# Patient Record
Sex: MALE | Race: WHITE | NOT HISPANIC OR LATINO | Employment: OTHER | URBAN - METROPOLITAN AREA
[De-identification: names, ages, dates, MRNs, and addresses within clinical notes are randomized per-mention and may not be internally consistent; named-entity substitution may affect disease eponyms.]

---

## 2017-01-06 ENCOUNTER — ALLSCRIPTS OFFICE VISIT (OUTPATIENT)
Dept: OTHER | Facility: OTHER | Age: 57
End: 2017-01-06

## 2017-01-13 ENCOUNTER — ALLSCRIPTS OFFICE VISIT (OUTPATIENT)
Dept: OTHER | Facility: OTHER | Age: 57
End: 2017-01-13

## 2017-01-23 ENCOUNTER — ALLSCRIPTS OFFICE VISIT (OUTPATIENT)
Dept: OTHER | Facility: OTHER | Age: 57
End: 2017-01-23

## 2017-01-23 LAB
CLARITY UR: NORMAL
COLOR UR: YELLOW
GLUCOSE (HISTORICAL): NORMAL
HGB UR QL STRIP.AUTO: NORMAL
KETONES UR STRIP-MCNC: NORMAL MG/DL
LEUKOCYTE ESTERASE UR QL STRIP: NORMAL
NITRITE UR QL STRIP: NORMAL
PH UR STRIP.AUTO: 5 [PH]
PROT UR STRIP-MCNC: NORMAL MG/DL
SP GR UR STRIP.AUTO: 1.01

## 2017-02-20 ENCOUNTER — ALLSCRIPTS OFFICE VISIT (OUTPATIENT)
Dept: OTHER | Facility: OTHER | Age: 57
End: 2017-02-20

## 2017-03-01 ENCOUNTER — LAB CONVERSION - ENCOUNTER (OUTPATIENT)
Dept: OTHER | Facility: OTHER | Age: 57
End: 2017-03-01

## 2017-03-01 LAB
CHOLEST SERPL-MCNC: 210 MG/DL (ref 125–200)
CHOLEST/HDLC SERPL: 5.7 (CALC)
HDLC SERPL-MCNC: 37 MG/DL
LDL CHOLESTEROL (HISTORICAL): 150 MG/DL (CALC)
NON-HDL-CHOL (CHOL-HDL) (HISTORICAL): 173 MG/DL (CALC)
TRIGL SERPL-MCNC: 117 MG/DL

## 2017-03-06 ENCOUNTER — ALLSCRIPTS OFFICE VISIT (OUTPATIENT)
Dept: OTHER | Facility: OTHER | Age: 57
End: 2017-03-06

## 2017-03-06 ENCOUNTER — GENERIC CONVERSION - ENCOUNTER (OUTPATIENT)
Dept: OTHER | Facility: OTHER | Age: 57
End: 2017-03-06

## 2017-03-17 ENCOUNTER — ALLSCRIPTS OFFICE VISIT (OUTPATIENT)
Dept: OTHER | Facility: OTHER | Age: 57
End: 2017-03-17

## 2017-03-27 ENCOUNTER — ALLSCRIPTS OFFICE VISIT (OUTPATIENT)
Dept: OTHER | Facility: OTHER | Age: 57
End: 2017-03-27

## 2017-03-27 ENCOUNTER — TRANSCRIBE ORDERS (OUTPATIENT)
Dept: ADMINISTRATIVE | Facility: HOSPITAL | Age: 57
End: 2017-03-27

## 2017-03-27 DIAGNOSIS — R51.9 FACIAL PAIN: Primary | ICD-10-CM

## 2017-03-27 DIAGNOSIS — H53.8 OTHER SPECIFIED VISUAL DISTURBANCES: ICD-10-CM

## 2017-03-27 DIAGNOSIS — R42 DIZZINESS AND GIDDINESS: ICD-10-CM

## 2017-04-02 ENCOUNTER — LAB CONVERSION - ENCOUNTER (OUTPATIENT)
Dept: OTHER | Facility: OTHER | Age: 57
End: 2017-04-02

## 2017-04-02 LAB
BACTERIA UR QL AUTO: ABNORMAL /HPF
BILIRUB UR QL STRIP: NEGATIVE
BUN SERPL-MCNC: 10 MG/DL (ref 7–25)
BUN/CREA RATIO (HISTORICAL): NORMAL (CALC) (ref 6–22)
CALCIUM SERPL-MCNC: 9.1 MG/DL (ref 8.6–10.3)
CATECHOLAMINES, TOTAL (HISTORICAL): 188 PG/ML
CHLORIDE SERPL-SCNC: 104 MMOL/L (ref 98–110)
CO2 SERPL-SCNC: 31 MMOL/L (ref 20–31)
COLOR UR: YELLOW
COMMENT (HISTORICAL): CLEAR
CREAT SERPL-MCNC: 1.18 MG/DL (ref 0.7–1.33)
DEPRECATED RDW RBC AUTO: 14.2 % (ref 11–15)
DOPAMINE (HISTORICAL): NORMAL PG/ML
EGFR AFRICAN AMERICAN (HISTORICAL): 79 ML/MIN/1.73M2
EGFR-AMERICAN CALC (HISTORICAL): 68 ML/MIN/1.73M2
EPINEPHRINE PLASMA (HISTORICAL): 26 PG/ML
FECAL OCCULT BLOOD DIAGNOSTIC (HISTORICAL): NEGATIVE
GLUCOSE (HISTORICAL): 95 MG/DL (ref 65–99)
GLUCOSE (HISTORICAL): NEGATIVE
HCT VFR BLD AUTO: 43.2 % (ref 38.5–50)
HGB BLD-MCNC: 14.1 G/DL (ref 13.2–17.1)
HYALINE CASTS #/AREA URNS LPF: ABNORMAL /LPF
KETONES UR STRIP-MCNC: NEGATIVE MG/DL
LEUKOCYTE ESTERASE UR QL STRIP: ABNORMAL
MCH RBC QN AUTO: 29.4 PG (ref 27–33)
MCHC RBC AUTO-ENTMCNC: 32.7 G/DL (ref 32–36)
MCV RBC AUTO: 89.8 FL (ref 80–100)
NITRITE UR QL STRIP: NEGATIVE
NOREPINEPHRINE PLASMA (HISTORICAL): 162 PG/ML
PH UR STRIP.AUTO: 6.5 [PH] (ref 5–8)
PLATELET # BLD AUTO: 182 THOUSAND/UL (ref 140–400)
PMV BLD AUTO: 8.3 FL (ref 7.5–12.5)
POTASSIUM SERPL-SCNC: 4 MMOL/L (ref 3.5–5.3)
PROT UR STRIP-MCNC: NEGATIVE MG/DL
RBC # BLD AUTO: 4.82 MILLION/UL (ref 4.2–5.8)
RBC (HISTORICAL): ABNORMAL /HPF
SODIUM SERPL-SCNC: 141 MMOL/L (ref 135–146)
SP GR UR STRIP.AUTO: 1.02 (ref 1–1.03)
SQUAMOUS EPITHELIAL CELLS (HISTORICAL): ABNORMAL /HPF
WBC # BLD AUTO: 8.1 THOUSAND/UL (ref 3.8–10.8)
WBC # BLD AUTO: ABNORMAL /HPF

## 2017-04-10 ENCOUNTER — ALLSCRIPTS OFFICE VISIT (OUTPATIENT)
Dept: OTHER | Facility: OTHER | Age: 57
End: 2017-04-10

## 2017-04-16 ENCOUNTER — HOSPITAL ENCOUNTER (OUTPATIENT)
Dept: MRI IMAGING | Facility: HOSPITAL | Age: 57
Discharge: HOME | End: 2017-04-16
Payer: COMMERCIAL

## 2017-04-16 DIAGNOSIS — R51 HEADACHE(784.0): ICD-10-CM

## 2017-04-16 DIAGNOSIS — R42 DIZZINESS AND GIDDINESS: ICD-10-CM

## 2017-04-16 DIAGNOSIS — H53.8 OTHER SPECIFIED VISUAL DISTURBANCES: ICD-10-CM

## 2017-04-16 PROCEDURE — 70553 MRI BRAIN STEM W/O & W/DYE: CPT

## 2017-04-16 PROCEDURE — A9585 GADOBUTROL INJECTION: HCPCS | Performed by: FAMILY MEDICINE

## 2017-04-16 RX ADMIN — GADOBUTROL 10 ML: 604.72 INJECTION INTRAVENOUS at 11:39

## 2017-04-17 ENCOUNTER — GENERIC CONVERSION - ENCOUNTER (OUTPATIENT)
Dept: OTHER | Facility: OTHER | Age: 57
End: 2017-04-17

## 2017-04-24 ENCOUNTER — ALLSCRIPTS OFFICE VISIT (OUTPATIENT)
Dept: OTHER | Facility: OTHER | Age: 57
End: 2017-04-24

## 2017-05-15 ENCOUNTER — ALLSCRIPTS OFFICE VISIT (OUTPATIENT)
Dept: OTHER | Facility: OTHER | Age: 57
End: 2017-05-15

## 2017-05-22 ENCOUNTER — ALLSCRIPTS OFFICE VISIT (OUTPATIENT)
Dept: OTHER | Facility: OTHER | Age: 57
End: 2017-05-22

## 2017-06-05 ENCOUNTER — HOSPITAL ENCOUNTER (OUTPATIENT)
Dept: CT IMAGING | Facility: HOSPITAL | Age: 57
Discharge: HOME | End: 2017-06-05
Attending: THORACIC SURGERY (CARDIOTHORACIC VASCULAR SURGERY)
Payer: COMMERCIAL

## 2017-06-05 DIAGNOSIS — I71.2 THORACIC AORTIC ANEURYSM WITHOUT RUPTURE (HCC): ICD-10-CM

## 2017-06-05 PROCEDURE — 71250 CT THORAX DX C-: CPT

## 2017-06-16 ENCOUNTER — ALLSCRIPTS OFFICE VISIT (OUTPATIENT)
Dept: OTHER | Facility: OTHER | Age: 57
End: 2017-06-16

## 2017-06-17 DIAGNOSIS — E78.2 MIXED HYPERLIPIDEMIA: ICD-10-CM

## 2017-06-23 ENCOUNTER — ALLSCRIPTS OFFICE VISIT (OUTPATIENT)
Dept: OTHER | Facility: OTHER | Age: 57
End: 2017-06-23

## 2017-07-17 ENCOUNTER — ALLSCRIPTS OFFICE VISIT (OUTPATIENT)
Dept: OTHER | Facility: OTHER | Age: 57
End: 2017-07-17

## 2017-08-26 ENCOUNTER — APPOINTMENT (EMERGENCY)
Dept: RADIOLOGY | Facility: HOSPITAL | Age: 57
DRG: 093 | End: 2017-08-26
Payer: COMMERCIAL

## 2017-08-26 ENCOUNTER — HOSPITAL ENCOUNTER (INPATIENT)
Facility: HOSPITAL | Age: 57
LOS: 2 days | Discharge: HOME | DRG: 093 | End: 2017-08-28
Attending: EMERGENCY MEDICINE | Admitting: INTERNAL MEDICINE
Payer: COMMERCIAL

## 2017-08-26 DIAGNOSIS — R53.1 SUBJECTIVE WEAKNESS: ICD-10-CM

## 2017-08-26 DIAGNOSIS — R20.2 PARESTHESIA OF LEFT ARM: Primary | ICD-10-CM

## 2017-08-26 DIAGNOSIS — R20.2 LEFT LEG PARESTHESIAS: ICD-10-CM

## 2017-08-26 DIAGNOSIS — R20.2 FACIAL PARESTHESIA: ICD-10-CM

## 2017-08-26 PROBLEM — K21.9 GASTROESOPHAGEAL REFLUX DISEASE WITHOUT ESOPHAGITIS: Status: ACTIVE | Noted: 2017-08-26

## 2017-08-26 PROBLEM — Z98.890 H/O AORTIC ANEURYSM REPAIR: Status: ACTIVE | Noted: 2017-08-26

## 2017-08-26 PROBLEM — I10 ESSENTIAL HYPERTENSION: Status: ACTIVE | Noted: 2017-08-26

## 2017-08-26 PROBLEM — R20.0 NUMBNESS: Status: ACTIVE | Noted: 2017-08-26

## 2017-08-26 PROBLEM — Z86.79 H/O AORTIC ANEURYSM REPAIR: Status: ACTIVE | Noted: 2017-08-26

## 2017-08-26 LAB
ANION GAP SERPL CALCULATED.3IONS-SCNC: 7 MMOL/L (ref 4–13)
APTT PPP: 28 SECONDS (ref 23–35)
ATRIAL RATE: 64 BPM
BASOPHILS # BLD AUTO: 0.03 THOUSANDS/ΜL (ref 0–0.1)
BASOPHILS NFR BLD AUTO: 0 % (ref 0–1)
BUN SERPL-MCNC: 10 MG/DL (ref 5–25)
CALCIUM SERPL-MCNC: 8.9 MG/DL (ref 8.3–10.1)
CHLORIDE SERPL-SCNC: 106 MMOL/L (ref 100–108)
CO2 SERPL-SCNC: 27 MMOL/L (ref 21–32)
CREAT SERPL-MCNC: 1.21 MG/DL (ref 0.6–1.3)
EOSINOPHIL # BLD AUTO: 0.49 THOUSAND/ΜL (ref 0–0.61)
EOSINOPHIL NFR BLD AUTO: 5 % (ref 0–6)
ERYTHROCYTE [DISTWIDTH] IN BLOOD BY AUTOMATED COUNT: 13.1 % (ref 11.6–15.1)
GFR SERPL CREATININE-BSD FRML MDRD: 66 ML/MIN/1.73SQ M
GLUCOSE SERPL-MCNC: 115 MG/DL (ref 65–140)
HCT VFR BLD AUTO: 41.8 % (ref 36.5–49.3)
HGB BLD-MCNC: 14.4 G/DL (ref 12–17)
INR PPP: 0.97 (ref 0.86–1.16)
LYMPHOCYTES # BLD AUTO: 1.89 THOUSANDS/ΜL (ref 0.6–4.47)
LYMPHOCYTES NFR BLD AUTO: 21 % (ref 14–44)
MCH RBC QN AUTO: 29.9 PG (ref 26.8–34.3)
MCHC RBC AUTO-ENTMCNC: 34.4 G/DL (ref 31.4–37.4)
MCV RBC AUTO: 87 FL (ref 82–98)
MONOCYTES # BLD AUTO: 0.52 THOUSAND/ΜL (ref 0.17–1.22)
MONOCYTES NFR BLD AUTO: 6 % (ref 4–12)
NEUTROPHILS # BLD AUTO: 6.1 THOUSANDS/ΜL (ref 1.85–7.62)
NEUTS SEG NFR BLD AUTO: 68 % (ref 43–75)
NRBC BLD AUTO-RTO: 0 /100 WBCS
P AXIS: 56 DEGREES
PLATELET # BLD AUTO: 171 THOUSANDS/UL (ref 149–390)
PLATELET # BLD AUTO: 204 THOUSANDS/UL (ref 149–390)
PMV BLD AUTO: 10.2 FL (ref 8.9–12.7)
PMV BLD AUTO: 9.6 FL (ref 8.9–12.7)
POTASSIUM SERPL-SCNC: 3.5 MMOL/L (ref 3.5–5.3)
PR INTERVAL: 146 MS
PROTHROMBIN TIME: 12.9 SECONDS (ref 12.1–14.4)
QRS AXIS: 13 DEGREES
QRSD INTERVAL: 78 MS
QT INTERVAL: 396 MS
QTC INTERVAL: 408 MS
RBC # BLD AUTO: 4.81 MILLION/UL (ref 3.88–5.62)
SODIUM SERPL-SCNC: 140 MMOL/L (ref 136–145)
T WAVE AXIS: 72 DEGREES
VENTRICULAR RATE: 64 BPM
WBC # BLD AUTO: 9.06 THOUSAND/UL (ref 4.31–10.16)

## 2017-08-26 PROCEDURE — 85049 AUTOMATED PLATELET COUNT: CPT | Performed by: INTERNAL MEDICINE

## 2017-08-26 PROCEDURE — 85730 THROMBOPLASTIN TIME PARTIAL: CPT | Performed by: EMERGENCY MEDICINE

## 2017-08-26 PROCEDURE — 99285 EMERGENCY DEPT VISIT HI MDM: CPT

## 2017-08-26 PROCEDURE — 80048 BASIC METABOLIC PNL TOTAL CA: CPT | Performed by: EMERGENCY MEDICINE

## 2017-08-26 PROCEDURE — 85610 PROTHROMBIN TIME: CPT | Performed by: EMERGENCY MEDICINE

## 2017-08-26 PROCEDURE — 85025 COMPLETE CBC W/AUTO DIFF WBC: CPT | Performed by: EMERGENCY MEDICINE

## 2017-08-26 PROCEDURE — 93005 ELECTROCARDIOGRAM TRACING: CPT | Performed by: EMERGENCY MEDICINE

## 2017-08-26 PROCEDURE — 36415 COLL VENOUS BLD VENIPUNCTURE: CPT | Performed by: EMERGENCY MEDICINE

## 2017-08-26 PROCEDURE — 70450 CT HEAD/BRAIN W/O DYE: CPT

## 2017-08-26 RX ORDER — OMEPRAZOLE 20 MG/1
20 CAPSULE, DELAYED RELEASE ORAL DAILY
COMMUNITY
End: 2018-02-22

## 2017-08-26 RX ORDER — ASPIRIN 325 MG
325 TABLET ORAL DAILY
Status: DISCONTINUED | OUTPATIENT
Start: 2017-08-27 | End: 2017-08-28 | Stop reason: HOSPADM

## 2017-08-26 RX ORDER — ONDANSETRON 2 MG/ML
4 INJECTION INTRAMUSCULAR; INTRAVENOUS EVERY 6 HOURS PRN
Status: DISCONTINUED | OUTPATIENT
Start: 2017-08-26 | End: 2017-08-28 | Stop reason: HOSPADM

## 2017-08-26 RX ORDER — ATORVASTATIN CALCIUM 40 MG/1
40 TABLET, FILM COATED ORAL EVERY EVENING
Status: DISCONTINUED | OUTPATIENT
Start: 2017-08-26 | End: 2017-08-28 | Stop reason: HOSPADM

## 2017-08-26 RX ORDER — METOPROLOL TARTRATE 50 MG/1
50 TABLET, FILM COATED ORAL EVERY 12 HOURS SCHEDULED
COMMUNITY
End: 2018-07-03 | Stop reason: SDUPTHER

## 2017-08-26 RX ORDER — ASPIRIN 325 MG
325 TABLET ORAL DAILY
COMMUNITY
End: 2020-01-31 | Stop reason: ALTCHOICE

## 2017-08-26 RX ORDER — ACETAMINOPHEN 325 MG/1
650 TABLET ORAL EVERY 4 HOURS PRN
Status: DISCONTINUED | OUTPATIENT
Start: 2017-08-26 | End: 2017-08-28 | Stop reason: HOSPADM

## 2017-08-26 RX ADMIN — ATORVASTATIN CALCIUM 40 MG: 40 TABLET, FILM COATED ORAL at 20:30

## 2017-08-27 ENCOUNTER — APPOINTMENT (INPATIENT)
Dept: RADIOLOGY | Facility: HOSPITAL | Age: 57
DRG: 093 | End: 2017-08-27
Payer: COMMERCIAL

## 2017-08-27 ENCOUNTER — APPOINTMENT (INPATIENT)
Dept: NON INVASIVE DIAGNOSTICS | Facility: HOSPITAL | Age: 57
DRG: 093 | End: 2017-08-27
Payer: COMMERCIAL

## 2017-08-27 LAB
ANION GAP SERPL CALCULATED.3IONS-SCNC: 7 MMOL/L (ref 4–13)
BUN SERPL-MCNC: 12 MG/DL (ref 5–25)
CALCIUM SERPL-MCNC: 8.3 MG/DL (ref 8.3–10.1)
CHLORIDE SERPL-SCNC: 107 MMOL/L (ref 100–108)
CHOLEST SERPL-MCNC: 144 MG/DL (ref 50–200)
CO2 SERPL-SCNC: 26 MMOL/L (ref 21–32)
CREAT SERPL-MCNC: 1.06 MG/DL (ref 0.6–1.3)
ERYTHROCYTE [DISTWIDTH] IN BLOOD BY AUTOMATED COUNT: 13.2 % (ref 11.6–15.1)
EST. AVERAGE GLUCOSE BLD GHB EST-MCNC: 105 MG/DL
GFR SERPL CREATININE-BSD FRML MDRD: 78 ML/MIN/1.73SQ M
GLUCOSE SERPL-MCNC: 114 MG/DL (ref 65–140)
HBA1C MFR BLD: 5.3 % (ref 4.2–6.3)
HCT VFR BLD AUTO: 40.8 % (ref 36.5–49.3)
HDLC SERPL-MCNC: 35 MG/DL (ref 40–60)
HGB BLD-MCNC: 14.2 G/DL (ref 12–17)
LDLC SERPL CALC-MCNC: 93 MG/DL (ref 0–100)
MCH RBC QN AUTO: 29.8 PG (ref 26.8–34.3)
MCHC RBC AUTO-ENTMCNC: 34.8 G/DL (ref 31.4–37.4)
MCV RBC AUTO: 86 FL (ref 82–98)
PLATELET # BLD AUTO: 180 THOUSANDS/UL (ref 149–390)
PMV BLD AUTO: 10.2 FL (ref 8.9–12.7)
POTASSIUM SERPL-SCNC: 3.7 MMOL/L (ref 3.5–5.3)
RBC # BLD AUTO: 4.76 MILLION/UL (ref 3.88–5.62)
SODIUM SERPL-SCNC: 140 MMOL/L (ref 136–145)
TRIGL SERPL-MCNC: 79 MG/DL
WBC # BLD AUTO: 8.05 THOUSAND/UL (ref 4.31–10.16)

## 2017-08-27 PROCEDURE — 80061 LIPID PANEL: CPT | Performed by: INTERNAL MEDICINE

## 2017-08-27 PROCEDURE — 84443 ASSAY THYROID STIM HORMONE: CPT | Performed by: PHYSICIAN ASSISTANT

## 2017-08-27 PROCEDURE — G8988 SELF CARE GOAL STATUS: HCPCS

## 2017-08-27 PROCEDURE — 85027 COMPLETE CBC AUTOMATED: CPT | Performed by: INTERNAL MEDICINE

## 2017-08-27 PROCEDURE — G8979 MOBILITY GOAL STATUS: HCPCS

## 2017-08-27 PROCEDURE — G8987 SELF CARE CURRENT STATUS: HCPCS

## 2017-08-27 PROCEDURE — 70551 MRI BRAIN STEM W/O DYE: CPT

## 2017-08-27 PROCEDURE — G8978 MOBILITY CURRENT STATUS: HCPCS

## 2017-08-27 PROCEDURE — G8980 MOBILITY D/C STATUS: HCPCS

## 2017-08-27 PROCEDURE — 97165 OT EVAL LOW COMPLEX 30 MIN: CPT

## 2017-08-27 PROCEDURE — 83036 HEMOGLOBIN GLYCOSYLATED A1C: CPT | Performed by: INTERNAL MEDICINE

## 2017-08-27 PROCEDURE — G8989 SELF CARE D/C STATUS: HCPCS

## 2017-08-27 PROCEDURE — 80048 BASIC METABOLIC PNL TOTAL CA: CPT | Performed by: INTERNAL MEDICINE

## 2017-08-27 PROCEDURE — 97162 PT EVAL MOD COMPLEX 30 MIN: CPT

## 2017-08-27 PROCEDURE — 93306 TTE W/DOPPLER COMPLETE: CPT

## 2017-08-27 RX ORDER — FAMOTIDINE 20 MG/1
20 TABLET, FILM COATED ORAL 2 TIMES DAILY PRN
Status: DISCONTINUED | OUTPATIENT
Start: 2017-08-27 | End: 2017-08-28 | Stop reason: HOSPADM

## 2017-08-27 RX ORDER — ATORVASTATIN CALCIUM 10 MG/1
10 TABLET, FILM COATED ORAL DAILY
COMMUNITY
End: 2017-08-28 | Stop reason: HOSPADM

## 2017-08-27 RX ORDER — METOPROLOL TARTRATE 50 MG/1
50 TABLET, FILM COATED ORAL EVERY 12 HOURS SCHEDULED
Status: DISCONTINUED | OUTPATIENT
Start: 2017-08-27 | End: 2017-08-28 | Stop reason: HOSPADM

## 2017-08-27 RX ADMIN — ASPIRIN 325 MG: 325 TABLET ORAL at 09:04

## 2017-08-27 RX ADMIN — ENOXAPARIN SODIUM 40 MG: 40 INJECTION SUBCUTANEOUS at 09:04

## 2017-08-27 RX ADMIN — METOPROLOL TARTRATE 50 MG: 50 TABLET ORAL at 20:50

## 2017-08-27 RX ADMIN — ATORVASTATIN CALCIUM 40 MG: 40 TABLET, FILM COATED ORAL at 17:43

## 2017-08-28 ENCOUNTER — APPOINTMENT (INPATIENT)
Dept: NON INVASIVE DIAGNOSTICS | Facility: HOSPITAL | Age: 57
DRG: 093 | End: 2017-08-28
Payer: COMMERCIAL

## 2017-08-28 VITALS
HEART RATE: 78 BPM | BODY MASS INDEX: 34.86 KG/M2 | OXYGEN SATURATION: 97 % | TEMPERATURE: 98.7 F | HEIGHT: 68 IN | WEIGHT: 230 LBS | SYSTOLIC BLOOD PRESSURE: 172 MMHG | DIASTOLIC BLOOD PRESSURE: 98 MMHG | RESPIRATION RATE: 18 BRPM

## 2017-08-28 LAB
TSH SERPL DL<=0.05 MIU/L-ACNC: 0.76 UIU/ML (ref 0.36–3.74)
VIT B12 SERPL-MCNC: 304 PG/ML (ref 100–900)

## 2017-08-28 PROCEDURE — 93880 EXTRACRANIAL BILAT STUDY: CPT

## 2017-08-28 PROCEDURE — 82607 VITAMIN B-12: CPT | Performed by: PHYSICIAN ASSISTANT

## 2017-08-28 RX ORDER — ATORVASTATIN CALCIUM 40 MG/1
40 TABLET, FILM COATED ORAL EVERY EVENING
Qty: 30 TABLET | Refills: 0 | Status: SHIPPED | OUTPATIENT
Start: 2017-08-28 | End: 2018-01-29 | Stop reason: ALTCHOICE

## 2017-08-28 RX ADMIN — ASPIRIN 325 MG: 325 TABLET ORAL at 08:23

## 2017-08-28 RX ADMIN — METOPROLOL TARTRATE 50 MG: 50 TABLET ORAL at 08:23

## 2017-08-28 RX ADMIN — ENOXAPARIN SODIUM 40 MG: 40 INJECTION SUBCUTANEOUS at 08:23

## 2017-08-28 RX ADMIN — ATORVASTATIN CALCIUM 40 MG: 40 TABLET, FILM COATED ORAL at 18:20

## 2017-09-01 ENCOUNTER — ALLSCRIPTS OFFICE VISIT (OUTPATIENT)
Dept: OTHER | Facility: OTHER | Age: 57
End: 2017-09-01

## 2017-09-01 ENCOUNTER — TRANSCRIBE ORDERS (OUTPATIENT)
Dept: ADMINISTRATIVE | Facility: HOSPITAL | Age: 57
End: 2017-09-01

## 2017-09-01 DIAGNOSIS — R20.0 ANESTHESIA OF SKIN: ICD-10-CM

## 2017-09-01 DIAGNOSIS — R20.0 TACTILE ANESTHESIA: Primary | ICD-10-CM

## 2017-09-01 DIAGNOSIS — R53.1 WEAKNESS: ICD-10-CM

## 2017-09-01 DIAGNOSIS — R53.1 ASTHENIA: ICD-10-CM

## 2017-09-11 ENCOUNTER — GENERIC CONVERSION - ENCOUNTER (OUTPATIENT)
Dept: OTHER | Facility: OTHER | Age: 57
End: 2017-09-11

## 2017-09-11 ENCOUNTER — HOSPITAL ENCOUNTER (OUTPATIENT)
Dept: MRI IMAGING | Facility: HOSPITAL | Age: 57
Discharge: HOME | End: 2017-09-11
Payer: COMMERCIAL

## 2017-09-11 DIAGNOSIS — R20.0 TACTILE ANESTHESIA: ICD-10-CM

## 2017-09-11 DIAGNOSIS — R53.1 ASTHENIA: ICD-10-CM

## 2017-09-11 PROCEDURE — A9585 GADOBUTROL INJECTION: HCPCS | Performed by: FAMILY MEDICINE

## 2017-09-11 PROCEDURE — 72156 MRI NECK SPINE W/O & W/DYE: CPT

## 2017-09-11 RX ADMIN — GADOBUTROL 10 ML: 604.72 INJECTION INTRAVENOUS at 07:52

## 2017-10-10 ENCOUNTER — ALLSCRIPTS OFFICE VISIT (OUTPATIENT)
Dept: OTHER | Facility: OTHER | Age: 57
End: 2017-10-10

## 2017-10-10 DIAGNOSIS — F41.9 ANXIETY DISORDER: ICD-10-CM

## 2017-10-10 DIAGNOSIS — I48.91 ATRIAL FIBRILLATION (HCC): ICD-10-CM

## 2017-10-10 DIAGNOSIS — G45.9 TRANSIENT CEREBRAL ISCHEMIC ATTACK: ICD-10-CM

## 2017-10-11 ENCOUNTER — GENERIC CONVERSION - ENCOUNTER (OUTPATIENT)
Dept: OTHER | Facility: OTHER | Age: 57
End: 2017-10-11

## 2017-10-11 NOTE — PROGRESS NOTES
Assessment  1  Numbness on left side (782 0) (R20 0)   2  Atrial fibrillation (427 31) (I48 91)   3  Anxiety disorder (300 00) (F41 9)   4  TIA (transient ischemic attack) (435 9) (G45 9)   5  Screening for depression (V79 0) (Z13 89)    Plan  Anxiety disorder, Atrial fibrillation    · HOLTER MONITOR - 24 HOUR; Status:Hold For - Scheduling; Requested for:10Oct2017; Anxiety disorder, Atrial fibrillation, TIA (transient ischemic attack)    · (1) LYME ANTIBODY PROFILE W/REFLEX TO WESTERN BLOT; Status:Active; Requested for:10Oct2017;   Screening for depression    · *VB-Depression Screening; Status:Complete - Retrospective By Protocol Authorization;    Done: [id number] 03:54PM    Discussion/Summary    1 : Numbness on the left side: Patient continues to have occasional symptoms on the left  At this point, he is not interested in further evaluation, the next step would be Neurology  MRI was negative  Atrial fibrillation: Patient is reporting that he does have some symptoms at night, i e  palpitations  My question is if these are atrial fibrillation versus anxiety versus other  At this point, would recommend had getting a Holter monitor, and re-evaluating  Hopefully this can be done the very near future to catch the episodes that are going on  Anxiety: Anxiety level is slightly elevated, which may be a symptom of problems with palpitations, or may be other issues  Will need to hold off before making any adjustments based on having the Holter monitor completed  If the Holter monitor does not reveal any abnormalities, consider further evaluation with echo, to rule out mitral valve prolapse which may similar late anxiety symptoms, therefore causing him to feel more symptoms  TIA: Resolved  Stable at the moment  did mention that he has felt this fatigue from before, and it is continued at the same level  Is not really resolved, even after the surgery  Exercise tolerance has not really increased either     Possible side effects of new medications were reviewed with the patient/guardian today  The treatment plan was reviewed with the patient/guardian  The patient/guardian understands and agrees with the treatment plan      Chief Complaint  C/O palpitations at night for about the past 5-6 days  flu vaccine  History of Present Illness  HPI: Patient did recently have an MRI of the cervical spine  Final report shows that it was an unremarkable MRI of the cervical spine  was having left-sided numbness and weakness  reports that the numbness has cleared quite a bit, though occasionally he still has some symptoms  has resolved on its own denies any prior diagnosis of Lyme disease  did report that his anxiety is slightly worse than it has been recently  Patient was seeing [facility] [place] previously, but they have since closed  He had been seeing Saji Orozco with [address], but the cost became prohibitive  has been having palpitations at night for the last several nights  He does not often have it during the day  Is later in the night, ie 3-5 am        Review of Systems    Constitutional: no fever or chills, feels well, no tiredness, no recent weight loss or weight gain  ENT: no complaints of earache, no loss of hearing, no nosebleeds or nasal discharge, no sore throat or hoarseness  Cardiovascular: as noted in HPI  Respiratory: no complaints of shortness of breath, no wheezing or cough, no dyspnea on exertion, no orthopnea or PND  Gastrointestinal: no complaints of abdominal pain, no constipation, no nausea or vomiting, no diarrhea or bloody stools  Genitourinary: no complaints of dysuria or incontinence, no hesitancy, no nocturia, no genital lesion, no inadequacy of penile erection  Musculoskeletal: no complaints of arthralgia, no myalgia, no joint swelling or stiffness, no limb pain or swelling  Integumentary: no complaints of skin rash or lesion, no itching or dry skin, no skin wounds     Neurological: no complaints of headache, no confusion, no numbness or tingling, no dizziness or fainting  Other Symptoms: Psych anxiety  Over the past 2 weeks, how often have you been bothered by the following problems? 1 ) Little interest or pleasure in doing things? Not at all    2 ) Feeling down, depressed or hopeless? Not at all    3 ) Trouble falling asleep or sleeping too much? Not at all    4 ) Feeling tired or having little energy? Several days  5 ) Poor appetite or overeating? Not at all    6 ) Feeling bad about yourself, or that you are a failure, or have let yourself or your family down? Not at all    7 ) Trouble concentrating on things, such as reading a newspaper or watching television? Not at all    8 ) Moving or speaking so slowly that other people could have noticed, or the opposite, moving or speaking faster than usual? Not at all  How difficult have these problems made it for you to do your work, take care of things at home, or get along with people? Somewhat difficult  Score 1      Active Problems  1  Allergic rhinitis (477 9) (J30 9)   2  Aneurysm of thoracic aorta (441 2) (I71 2)   3  Anxiety disorder (300 00) (F41 9)   4  History of Aortic Aneurysm Repair Ascending Aorta   5  Atherosclerosis of coronary artery bypass graft(s) without angina pectoris (414 05)   (I25 810)   6  Atrial fibrillation (427 31) (I48 91)   7  Atypical chest pain (786 59) (R07 89)   8  Blurry vision (368 8) (H53 8)   9  Chest pain (786 50) (R07 9)   10  Colon cancer screening (V76 51) (Z12 11)   11  Constipation (564 00) (K59 00)   12  Depression (311) (F32 9)   13  Dysconjugate gaze (378 87) (H51 8)   14  ETD (eustachian tube dysfunction) (381 81) (H69 80)   15  GERD (gastroesophageal reflux disease) (530 81) (K21 9)   16  Headache (784 0) (R51)   17  Hernia (553 9) (K46 9)   18  Hypertension (401 9) (I10)   19  Irritable bowel syndrome (564 1) (K58 9)   20  Medication management (V58 69) (Z79 899)   21   Microangiopathy (443 9) (I73 9)   22  Mixed hyperlipidemia (272 2) (E78 2)   23  Murmur (785 2) (R01 1)   24  Neoplasm of skin (239 2) (D49 2)   25  Nephrolithiasis (592 0) (N20 0)   26  Nephrolithiasis (592 0) (N20 0)   27  Numbness on left side (782 0) (R20 0)   28  Obesity, Class II, BMI 35-39 9, with comorbidity (278 00) (E66 9)   29  Palpitations (785 1) (R00 2)   30  Panic attack (300 01) (F41 0)   31  Peyronie disease (607 85) (N48 6)   32  Preop testing (V72 84) (Z01 818)   33  Prostate cancer screening (V76 44) (Z12 5)   34  Rheumatic heart disease (398 90) (I09 9)   35  Rosacea (695 3) (L71 9)   36  S/P ascending aortic replacement (V43 4) (Z95 828)   37  Sciatica (724 3) (M54 30)   38  Serous otitis media (381 4) (H65 90)   39  Shoulder pain (719 41) (M25 519)   40  TIA (transient ischemic attack) (435 9) (G45 9)   41  Tremor (781 0) (R25 1)   42  Vertigo (780 4) (R42)   43  Vitamin D deficiency (268 9) (E55 9)   44  Weakness of left side of body (728 87) (R53 1)    Past Medical History  1  Aneurysm of thoracic aorta (441 2) (I71 2)   2  History of D-dimer, elevated (790 92) (R79 89)   3  History of cholelithiasis (V12 79) (Z87 19)   4  History of diverticulitis of colon (V12 79) (Z87 19)   5  History of TIA (transient ischemic attack) (V12 54) (Z86 73)   6  History of Urinary tract infection, acute (599 0) (N39 0)  Active Problems And Past Medical History Reviewed: The active problems and past medical history were reviewed and updated today  Family History  Mother    1  Family history of diverticulitis of colon (V18 59) (Z83 79)   2  Family history of Nephrolithiasis  Father    3  Family history of emphysema (V17 6) (Z82 5)  Sibling    4  Family history of diabetes mellitus (V18 0) (Z83 3)   5  Family history of hypertension (V17 49) (Z82 49)   6  Family history of Hernia  Sister    7  Family history of Nephrolithiasis  Paternal Grandmother    6  Family history of breast cancer (V16 3) (Z80 3)  Maternal Grandfather    9  Family history of Myocardial infarct  Paternal Grandfather    8  Family history of lung cancer (V16 1) (Z80 1)  Family History    11  Family history of Cancer   12  Family history of Coronary Artery Disease (V17 49)   13  Family history of Death In The Family Father  Family History Reviewed: The family history was reviewed and updated today  Social History   · Denied: Alcohol use   · Caffeine use (V49 89) (F15 90)   · Completed some college   ·    · Denied: Drug use (305 90) (F19 90)   · Never a smoker   · No secondhand smoke exposure (V49 89) (Z78 9)  The social history was reviewed and updated today  The social history was reviewed and is unchanged  Surgical History  1  History of Aortic Aneurysm Repair Ascending Aorta   2  History of Appendectomy   3  History of Cholecystectomy Laparoscopic   4  History of Cystoscopy With Removal Of Object   5  History of Knee Arthroscopy With Medial Meniscus Repair   6  History of Renal Lithotripsy   7  History of Thoracic Aortic Aneurysmorrhaphy  Surgical History Reviewed: The surgical history was reviewed and updated today  Current Meds   1  Aspirin 325 MG Oral Tablet; TAKE 1 TABLET DAILY; Therapy: 23ZVR0172 to (Evaluate:06Nov2015); Last Rx:11Nov2014 Ordered   2  Atorvastatin Calcium 40 MG Oral Tablet; Take 1 tablet daily; Therapy: 35OIB3047 to (Last Nicholas Pavon)  Requested for: 21JBQ4107 Ordered   3  Metoprolol Tartrate 25 MG Oral Tablet; TAKE 1 TABLET TWICE DAILY; Therapy: 69KSO4108 to (QRQOQFUV:53GDC9802)  Requested for: 20VOK5222; Last   CM:22CCD3367 Ordered   4  Omeprazole 40 MG Oral Capsule Delayed Release; take 1 capsule daily; Therapy: 08TDH9873 to (NESNUKYO:52XEJ7182)  Requested for: 68PAI5679; Last   Rx:45Niq5871 Ordered    The medication list was reviewed and updated today  Allergies  1  TraMADol HCl ER TB24   2   Bactrim TABS    Vitals   Recorded: 76AXF1454 03:50PM   Heart Rate 64, L Radial   Pulse Quality Regular, L Radial   Systolic 061, LUE, Sitting   Diastolic 84, LUE, Sitting   BP CUFF SIZE Large   Height 5 ft 8 in   Weight 235 lb 6 4 oz   BMI Calculated 35 79   BSA Calculated 2 19     Physical Exam    Constitutional   General appearance: No acute distress, well appearing and well nourished  Pulmonary   Respiratory effort: No increased work of breathing or signs of respiratory distress  Auscultation of lungs: Clear to auscultation, equal breath sounds bilaterally, no wheezes, no rales, no rhonci  Cardiovascular   Auscultation of heart: Abnormal   The heart rate was normal  The rhythm was regular  Heart sounds: normal S1,-normal S2-and-no gallop heard  A grade 1 systolic mumur was heard in the interscapular region  Carotid pulses: Normal          Results/Data  *VB-Depression Screening [id number] 03:54PM Arlene Skiff     Test Name Result Flag Reference   Depression Scale Result      Depression Screen - Negative For Symptoms       Future Appointments    Date/Time Provider Specialty Site   11/02/2017 03:45 PM [facility]  [address] [facility]   01/22/2018 10:15 AM Heron Salguero MD Urology [address]     Signatures   Electronically signed by :  MAGDALENE Joy ; Oct 10 2017  4:30PM EST                       (Author)

## 2017-10-13 ENCOUNTER — LAB CONVERSION - ENCOUNTER (OUTPATIENT)
Dept: OTHER | Facility: OTHER | Age: 57
End: 2017-10-13

## 2017-10-13 ENCOUNTER — GENERIC CONVERSION - ENCOUNTER (OUTPATIENT)
Dept: OTHER | Facility: OTHER | Age: 57
End: 2017-10-13

## 2017-10-13 LAB — LYME IGG/IGM AB (HISTORICAL): <0.9 INDEX

## 2017-10-30 ENCOUNTER — HOSPITAL ENCOUNTER (OUTPATIENT)
Dept: NON INVASIVE DIAGNOSTICS | Facility: HOSPITAL | Age: 57
Discharge: HOME | End: 2017-10-30
Payer: COMMERCIAL

## 2017-10-30 DIAGNOSIS — F41.9 ANXIETY DISORDER: ICD-10-CM

## 2017-10-30 DIAGNOSIS — I48.91 ATRIAL FIBRILLATION (HCC): ICD-10-CM

## 2017-10-30 PROCEDURE — 93225 XTRNL ECG REC<48 HRS REC: CPT

## 2017-10-30 PROCEDURE — 93226 XTRNL ECG REC<48 HR SCAN A/R: CPT

## 2017-11-02 ENCOUNTER — ALLSCRIPTS OFFICE VISIT (OUTPATIENT)
Dept: OTHER | Facility: OTHER | Age: 57
End: 2017-11-02

## 2017-11-03 ENCOUNTER — GENERIC CONVERSION - ENCOUNTER (OUTPATIENT)
Dept: OTHER | Facility: OTHER | Age: 57
End: 2017-11-03

## 2017-11-10 ENCOUNTER — ALLSCRIPTS OFFICE VISIT (OUTPATIENT)
Dept: OTHER | Facility: OTHER | Age: 57
End: 2017-11-10

## 2017-11-10 NOTE — PROGRESS NOTES
Assessment    1  Numbness on left side (782 0) (R20 0)    Plan  Numbness on left side    · Follow-up PRN Evaluation and Treatment  Follow-up  Status: Complete  Done:03Nov2017   Ordered;Numbness on left side; Ordered By: Dixon Copeland Performed:  Due: 54LTJ6413    Discussion/Summary  Discussion Summary:   Patient describes a transient numbness and tingling sensation in the left face, arm and thigh which has been present since his aneurysm repair 3 years ago  He had a very extensive workup include MRI brain, MRI cervical spine, ECHO, cartoid doppler and labs which have all been negative for an underlying cause  He had no objective findings on exam and no complaints of weakness, headache, vision changes or other neurological complaints  Reviewed all of his workup with the patient  At this time I do not see an underlying neurological cause for his complaints  Given his findings are subjective and intermittent in nature I do not have a medication that would prevent this from coming on  He was encouraged to return to the office with any new symptoms or present to the ED with any associated weakness, severe headache or acute changes  does have evidence of small vessel disease on his MRI and disucssed stroke preventions with the patient  He takes ASA daily  He follows with cardiology for his atrial fibrillation  He has not been started on anticoagulation given he has not had recurrence of afib and there was no evidence of this on his telemetry monitoring in the hospital  He takes Metoprolol for his BP however he is still on the higher end and will have him continue to monitor this with his PCP  He also recently stopped taking Atorvastatin given concern that his was causing LE edema  For stroke prevention it would be better for him to be on a statin and will have him discuss other medication options with his PCP  He was encouraged to get more active and monitor his diet as well  the case and plan with Dr Maya Robertson  Counseling Documentation With Imm: The patient was counseled regarding diagnostic results,-- instructions for management,-- patient and family education,-- impressions,-- risks and benefits of treatment options  total time of encounter was 35 minutes-- and-- 19 minutes was spent counseling  Chief Complaint  Chief Complaint Free Text Note Form: Patient present for neurology follow up for numbness, tingling on the left  History of Present Illness  HPI: Mr Jyoti Celeste is 62year old man with tremors who presents for follow up  To review, he developed a left arm and bilateral leg tremor in November in 2014  It began after a TIA the day after his thoracic aortic aneurysm repair  He reports the TIA symptoms were left hand, face and leg numbness lasting about 30 minutes and he had a CT head then next morning which was unremarkable  He developed tremors sometime after this  He reports tingling in his left ring finger lasted three months  Prior to surgery he was having depression and panic attacks  He was on an anxiety medication which was switched to clonazepam 1mg qam, Â½ q afternoon,1/2 qhs and Prozac 40mg when seen by psychiatry in Dec 2014  He takes metoprolol 25m bid for cardiac reasons  his initial visit in on 1/8/15 he was noted to have mild, intermittent, left hand postural tremor which was mostly present when patient speaking of and describing tremor  There was no associated rest tremor, bradykinesia, dystonic posturing or neuropathic signs  He was sent for an MRI of the brain which was revealed mild to moderate chronic microangiopathic changes  Given no findings to explain his tremor it was recommended that he have cognitive behavioral therapy for his tremors  presents today for a hospital follow up  He was admitted on 8/26/17 with left sided numbness (face, arm and leg)   This had started a few days prior and progressed to include weakness in the left hand which prompted him to present to the ED  The symptoms would come and go throughout the days and last 15-30 minutes  MRI brain revealed small vessel ischemic changes with no evidence of acute CVA  Question of an underlying inflammatory etiology and it was recommended that he have further imaging performed as an outpatient  During his hospital course he was complaining of intermittent symptoms of numbness in both the left and right side of the face  It was felt low suspicion for vascular cause of complaints  He remained on statin and ASA which he had been taking prior to the event  TSH was normal, B12 on the lower end but in normal range (304)  No significant carotid stenosis noted on US (stable from 2014)  Echo revealed EF 60% with Grade 1 diastolic dysfunction and mild tricuspid regurgitation  Although he does have a history of atrial fibrillation he had no recurrence through the years and his telemetry monitoring was negative in the hospital so no anticoagulation was started  He was discharged home and has followed with his PCP since that time  He had cervical spine imaging which was normal with no abnormal enhancement noted  He also had lyme testing which was negative  He also recently had a Holter monitor and he is awaiting the results of this  is no longer taking Atorvastatin given he was having LE edema and felt this was the cause  He is still taking ASA and Metoprolol  He is unsure the reason he is here today  He does continue to have occasional left sided numbness  He will have this every few days  When he has it is in the left side of the face, left arm and left leg mainly just in the thigh  He denies any sensory changes in the abdomen  The numbness will last about 20 minutes and then it will resolve  No symptoms on the right  He denies any associated pain or weakness   He states that he has been having the numbness on and off since his aneurysm repair 3 years ago however prior to going into the hospital this past time the numbness was getting more constant  He no longer follows with psychiatry due to insurance coverage and he is now off Prozac   his ROS, FH, Sh and social history  Review of Systems  Neurological ROS:  Constitutional: recent weight gain  HEENT: sinus problems  Cardiovascular: chest pain or pressure,-- palpitations present -- and-- rapid or irregular heart rate  Respiratory: shortness of breath with or without exertion  Gastrointestinal: diarrhea-- and-- changes in bowel habits  Genitourinary:  no incontinence, no feelings of urinary urgency, no increase in frequency, no urinary hesitancy, no dysuria, no hematuria  Musculoskeletal:  no arthralgias, no myalgias, no immobility or loss of function, no head/neck/back pain, no pain while walking  Integumentary  no masses, no rash, no skin lesions, no livedo reticularis  Psychiatric: anxiety,-- depression-- and-- mood swings  Endocrine  no unusual weight loss or gain, no excessive urination, no excessive thirst, no hair loss or gain, no hot or cold intolerance, no menstrual period change or irregularity, no loss of sexual ability or drive, no erection difficulty, no nipple discharge  Hematologic/Lymphatic: a tendency for easy bruising  Neurological General: headache  Neurological Mental Status: confusion-- and-- memory problems  Neurological Cranial Nerves: facial numbness or weakness-- and-- vertigo or dizziness  Neurological Motor findings include: tremor-- and-- twitching  Neurological Coordination: balance difficulties  Neurological Sensory: numbness-- and-- tingling  Neurological Gait: difficulty walking  Active Problems    1  Allergic rhinitis (477 9) (J30 9)   2  Aneurysm of thoracic aorta (441 2) (I71 2)   3  Anxiety disorder (300 00) (F41 9)   4  History of Aortic Aneurysm Repair Ascending Aorta   5  Atherosclerosis of coronary artery bypass graft(s) without angina pectoris (414 05) (I25 810)   6  Atrial fibrillation (427 31) (I48 91)   7   Atypical chest pain (786 59) (R07 89)   8  Blurry vision (368 8) (H53 8)   9  Chest pain (786 50) (R07 9)   10  Colon cancer screening (V76 51) (Z12 11)   11  Constipation (564 00) (K59 00)   12  Depression (311) (F32 9)   13  Dysconjugate gaze (378 87) (H51 8)   14  ETD (eustachian tube dysfunction) (381 81) (H69 80)   15  GERD (gastroesophageal reflux disease) (530 81) (K21 9)   16  Headache (784 0) (R51)   17  Hernia (553 9) (K46 9)   18  Hypertension (401 9) (I10)   19  Irritable bowel syndrome (564 1) (K58 9)   20  Medication management (V58 69) (Z79 899)   21  Microangiopathy (443 9) (I73 9)   22  Mixed hyperlipidemia (272 2) (E78 2)   23  Murmur (785 2) (R01 1)   24  Neoplasm of skin (239 2) (D49 2)   25  Nephrolithiasis (592 0) (N20 0)   26  Nephrolithiasis (592 0) (N20 0)   27  Numbness on left side (782 0) (R20 0)   28  Obesity, Class II, BMI 35-39 9, with comorbidity (278 00) (E66 9)   29  Palpitations (785 1) (R00 2)   30  Panic attack (300 01) (F41 0)   31  Peyronie disease (607 85) (N48 6)   32  Preop testing (V72 84) (Z01 818)   33  Prostate cancer screening (V76 44) (Z12 5)   34  Rheumatic heart disease (398 90) (I09 9)   35  Rosacea (695 3) (L71 9)   36  S/P ascending aortic replacement (V43 4) (Z95 828)   37  Sciatica (724 3) (M54 30)   38  Screening for depression (V79 0) (Z13 89)   39  Serous otitis media (381 4) (H65 90)   40  Shoulder pain (719 41) (M25 519)   41  TIA (transient ischemic attack) (435 9) (G45 9)   42  Tremor (781 0) (R25 1)   43  Vertigo (780 4) (R42)   44  Vitamin D deficiency (268 9) (E55 9)   45  Weakness of left side of body (728 87) (R53 1)    Past Medical History  1  Aneurysm of thoracic aorta (441 2) (I71 2)   2  History of D-dimer, elevated (790 92) (R79 89)   3  History of cholelithiasis (V12 79) (Z87 19)   4  History of diverticulitis of colon (V12 79) (Z87 19)   5  History of TIA (transient ischemic attack) (V12 54) (Z86 73)   6   History of Urinary tract infection, acute (599 0) (N39 0)    Surgical History    1  History of Aortic Aneurysm Repair Ascending Aorta   2  History of Appendectomy   3  History of Cholecystectomy Laparoscopic   4  History of Cystoscopy With Removal Of Object   5  History of Knee Arthroscopy With Medial Meniscus Repair   6  History of Renal Lithotripsy   7  History of Thoracic Aortic Aneurysmorrhaphy    Family History  Mother    1  Family history of diverticulitis of colon (V18 59) (Z83 79)   2  Family history of Nephrolithiasis  Father    3  Family history of emphysema (V17 6) (Z82 5)  Sibling    4  Family history of diabetes mellitus (V18 0) (Z83 3)   5  Family history of hypertension (V17 49) (Z82 49)   6  Family history of Hernia  Sister    7  Family history of Nephrolithiasis  Paternal Grandmother    6  Family history of breast cancer (V16 3) (Z80 3)  Maternal Grandfather    9  Family history of Myocardial infarct  Paternal Grandfather    8  Family history of lung cancer (V16 1) (Z80 1)  Family History    11  Family history of Cancer   12  Family history of Coronary Artery Disease (V17 49)   13  Family history of Death In The Family Father    Social History     · Denied: Alcohol use   · Caffeine use (V49 89) (F15 90)   · Completed some college   ·    · Denied: Drug use (305 90) (F19 90)   · Never a smoker   · No secondhand smoke exposure (V49 89) (Z78 9)    Current Meds   1  Aspirin 325 MG Oral Tablet; TAKE 1 TABLET DAILY; Therapy: 16FIY7935 to (Evaluate:06Nov2015); Last Rx:11Nov2014 Ordered   2  Atorvastatin Calcium 40 MG Oral Tablet; Take 1 tablet daily; Therapy[facility] 87NFZ1119 [facility] ([facility])  Requested for: 13HWJ9626 Ordered   3  Metoprolol Tartrate 25 MG Oral Tablet; TAKE 1 TABLET TWICE DAILY; Therapy: 11ZCV5219 to ([facility]:14ITF3004)  Requested for: 61QNY5595; Last DT:07EQA9985 Ordered   4  Omeprazole 40 MG Oral Capsule Delayed Release; take 1 capsule daily;  Therapy: 20PUP0083 to ([facility]:47IBT2790)  Requested for: 07CPC1870; Last Rx:11Gpd9241 Ordered    Allergies    1  TraMADol HCl ER TB24   2  Bactrim TABS    Vitals  Signs   Recorded: 19SRK3090 04:02PM   Heart Rate: 68  Respiration: 16  Systolic: 064, LUE, Sitting  Diastolic: 92, LUE, Sitting  Height: 5 ft 8 in  Weight: 240 lb   BMI Calculated: 36 49  BSA Calculated: 2 21    Physical Exam   Constitutional  General appearance: No acute distress, well appearing and well nourished  Eyes  Ophthalmoscopic examination: Vision is grossly normal  Gross visual field testing by confrontation shows no abnormalities  EOMI in both eyes  Conjunctivae clear  Eyelids normal palpebral fissures equal  Orbits exhibit normal position  No discharge from the eyes  PERRL  Musculoskeletal  Gait and station: Normal gait, stance and balance  -- (Arose without issues  Good stride  )  Muscle strength: Normal strength throughout  -- (No weakness noted  )  Muscle tone: No atrophy, abnormal movements, flaccidity, cogwheeling or spasticity  Involuntary movements: None observed  -- (No action tremor with finger to nose testing  No postural tremor  No resting tremor  )  Neurologic  Orientation to person, place, and time: Normal    Recent and remote memory: Demonstrates normal memory  Attention span and concentration: Normal thought process and attention span  Language: Names objects, able to repeat phrases and speaks spontaneously  Fund of knowledge: Normal vocabulary with appropriate knowledge of current events and past history  2nd cranial nerve: Normal    3rd, 4th, and 6th cranial nerves: Normal   extraocular movements intact  5th cranial nerve: Normal  -- (No sensory changes noted with light touch  ) normal masseter strength  7th cranial nerve: Normal    8th cranial nerve: Normal    9th cranial nerve: Normal    11th cranial nerve: Normal    12th cranial nerve: Normal    Sensation: Normal  -- (Normal to light touch and pin prick   Patient did have some slightly decreased vibration at the knee bilaterally  )  Reflexes: Normal    Coordination: Normal  -- (Able to tandem walk  ) Coordination: no past-pointing,-- no finger to nose dysmetria-- and-- no heel-shin dysmetria  Results/Data  (Q) LYME DISEASE AB, TOTAL W/REFL WB (IGG, IGM) 12Oct2017 11:06AM [facility]     Test Name Result Flag Reference   LYME AB SCREEN <0 90 index       Index                Interpretation                    -----                --------------                    < 0 90               Negative                    0  90-1 09            Equivocal                    > 1 09               Positive    As recommended by the Food and Drug Administration  (FDA), all samples with positive or equivocal  results in a Borrelia burgdorferi antibody screen will be tested using a blot method  Positive or  equivocal screening test results should not be  interpreted as truly positive until verified as such  using a supplemental assay (e g , B  burgdorferi blot)  The screening test and/or blot for B  burgdorferi  antibodies may be falsely negative in early stages of Lyme disease, including the period when erythema  migrans is apparent  * MRI CERVICAL SPINE W WO CONTRAST 75DBP2861 06:48AM [facility]     Test Name Result Flag Reference   MRI CERVICAL SPINE W [address] (Report)       MRI CERVICAL SPINE WITH AND WITHOUT CONTRAST   INDICATION: Left facial numbness  COMPARISON: None  TECHNIQUE: Sagittal T1, sagittal T2, sagittal inversion recovery, axial 2D merge and axial T2  Sagittal T1 and axial T1 postcontrast     IV Contrast: 10 mL of gadobutrol injection (MULTI-DOSE)    IMAGE QUALITY: Diagnostic  FINDINGS:   ALIGNMENT: Normal alignment of the cervical spine  No compression fracture  No subluxation  No scoliosis  MARROW SIGNAL: Moderate sized T3 hemangioma  CERVICAL AND VISUALIZED UPPER THORACIC CORD: Normal signal within the visualized cord     PREVERTEBRAL AND PARASPINAL SOFT TISSUES: Normal        VISUALIZED POSTERIOR FOSSA: The visualized posterior fossa demonstrates no abnormal signal    CERVICAL DISC SPACES:      C2-C3: Normal    C3-C4: Normal    C4-C5: Normal    C5-C6: Normal    C6-C7: Normal    C7-T1: Normal    UPPER THORACIC DISC SPACES: Normal    POSTCONTRAST IMAGING: Normal     IMPRESSION:   Unremarkable MRI of the cervical spine  Workstation performed: [id number]   Signed by:  Margarita Wilks DO  9/11/17     * MRI BRAIN IAC WO AND W CONTRAST 16Apr2017 10:13AM [facility] Order Number: [id number]  Performing Comments: 3/1/17 BUN 10/creatinine 1 29   - Patient Instructions: To schedule this appointment, please contact Central Scheduling at [phone]  Test Name Result Flag Reference   MRI BRAIN IAC WO AND W CONTRAST (Report)       This is a summary report  The complete report is available in the patient's medical record  If you cannot access the medical record, please contact the sending organization for a detailed fax or copy  MRI BRAIN AND IAC'S - WITH AND WITHOUT CONTRAST   INDICATION: 71-year-old male, headaches, dizziness, blurry vision 2 weeks   COMPARISON: 1/22/2016 MRI   TECHNIQUE:  Brain:  Sagittal T1, axial T2, axial [place], axial T1, axial FLAIR, axial diffusion imaging  Axial T1 postcontrast          IAC'S: Coronal FIESTA, coronal T1 postcontrast, axial T1 postcontrast with fat suppression  Targeted images of the IAC'S were performed requiring additional time at acquisition and interpretation of approximately 25%   IV Contrast: 10 mL of gadobutrol injection (MULTI-DOSE)    IMAGE QUALITY:  Diagnostic  FINDINGS:   BRAIN PARENCHYMA:   Numerous T2 and FLAIR hyperintense foci most consistent with mild-to-moderate chronic microangiopathic ischemic changes are present within the supratentorial white matter, similar to previous  No acute ischemic disease identified   There is no discrete mass, mass effect or midline shift   Brainstem and cerebellum demonstrate normal signal  There is no intracranial hemorrhage  There is no evidence of acute infarction and diffusion imaging is unremarkable  Normal postcontrast   imaging  IAC'S: No CP angle mass or abnormal enhancement  Normal aeration of the mastoid air cells and middle ear cavity  VENTRICLES: Normal    SELLA AND PITUITARY GLAND: Normal    ORBITS: Normal    PARANASAL SINUSES: Mild diffuse paranasal sinus mucosal thickening without air-fluid levels, unchanged   VASCULATURE: Evaluation of the major intracranial vasculature demonstrates appropriate flow voids  CALVARIUM AND SKULL BASE: Normal    EXTRACRANIAL SOFT TISSUES: Normal     IMPRESSION:  Persistent supratentorial white matter T2 and FLAIR hyperintense foci most likely represent mild to moderate chronic microangiopathic ischemic disease, unchanged   No acute ischemic disease   Mild diffuse paranasal sinus disease without air-fluid levels, unchanged   Normal posterior fossa and IACs       Workstation performed: [id number]   Signed by:  Reyna Guaman MD  4/17/17     Attending Note  Collaborating Physician Note: Collaborating Note: I agree with the Advanced Practitioner note  I discussed the case with the Advanced Practitioner and reviewed the AP note      Future Appointments    Date/Time Provider Specialty Site   11/10/2017 11:00 AM MAGDALENE Groves   [address] [place] [address]   01/22/2018 10:15 AM Steve Gonzales MD [facility]rology [facility] [address] [place] [address]       Signatures   Electronically signed by : Shella Phalen, [facility]; Nov  3 2017  8:43AM EST                       (Author)    Electronically signed by : Kaushik Tse MD; Nov 9 2017  7:03AM EST                       (Author)

## 2017-11-11 NOTE — PROGRESS NOTES
Assessment    1  Atrial fibrillation (427 31) (I48 91)   2  Mixed hyperlipidemia (272 2) (E78 2)   3  Microangiopathy (443 9) (I73 9)   · MRI April 2017   4  Atherosclerosis of coronary artery bypass graft(s) without angina pectoris (414 05) (I25 810)   5  Colon cancer screening (V76 51) (Z12 11)   6  BRBPR (bright red blood per rectum) (569 3) (K62 5)   7  Fecal soiling (787 62) (R15 1)   · Minor   8  Edema of both legs (782 3) (R60 0)    Plan  Aneurysm of thoracic aorta, PMH: Aortic Aneurysm Repair Ascending Aorta,Atherosclerosis of coronary artery bypass graft(s) without angina pectoris, Atrialfibrillation    · 1 - Danae Easton DO  Cardiology Co-Management  *  Status: Active  Requested for:60Ntt4357  Care Summary provided  : Yes  BRBPR (bright red blood per rectum), Colon cancer screening, Fecal soiling    · 2 - Thais Clark MD  (Colorectal Surgery) Co-Management  *  Status: Hold For -Scheduling  Requested for: 21HBH2204  Care Summary provided  : Yes  Hypertension    · Atorvastatin Calcium 40 MG Oral Tablet  Mixed hyperlipidemia    · Rosuvastatin Calcium 20 MG Oral Tablet (Crestor); Take one tablet by mouth daily   · (1) COMPREHENSIVE METABOLIC PANEL; Status:Active; Requested for:94Izu2741;    · (1) LIPID PANEL FASTING W DIRECT LDL REFLEX; Status:Active; Requestedfor:98Rfd7528; Discussion/Summary    1 : Atrial fibrillation: Stable at the moment  Recent Holter monitor did not show any evidence of AFib  Not currently on anticoagulation  Follow with Cardiology  Hyperlipidemia: Patient did have some series side effects from atorvastatin, i e  bilateral lower extremity edema  After discontinuing the atorvastatin, the edema went away, or at least significantly lessened  Recommend trial of Crestor 20 mg, as he was on atorvastatin 40 mg  check labs in approximately 3 months to confirm that there are no liver damage is from Crestor and that it is doing the appropriate job for cholesterol  Micro angiopathy, cerebral: Reviewed with him about small vessel disease, i e  increased risk of stroke, dementia  Best possible attack for this is to limit the risk factors, including blood pressure and cholesterol  CAD: Stable at the moment  Follows with Cardiology  Referred to Dr Carlos Alberto Giraldo  Patient did mention that he is having some shortness of breath at times, so I wonder if it is CAD versus deconditioning  Consider cardiac rehab  Colon cancer screening: Patient did receive a card from Colorectal surgery  Referred back to them  Bright red blood per rectum: Patient did mention that he has a small amount of red blood  This may be secondary to hemorrhoids, but he will also be seeing Colorectal surgery in the near future  Fecal soiling: Patient did mention that he has a small amount of leakage almost daily  Again, follow with Colorectal surgery  7 : Edema: Again, recommend follow with Cardiology  We did change from atorvastatin to remove a statin  Re-evaluate in the future as needed  At this point, he has no crackles in the lungs, and no other changes, so I elected not to get a chest x-ray  Chief Complaint  Follow up and discuss Neurology appt and sleep study  Pt states he stopped his Atorvastatin due to swelling and discomfort but knows he should be on something especially since Neuro told him to continue statin for Stroke prevention  kw      History of Present Illness  Holter monitor was reviewed  It did not show atrial fibrillation at this point  review the most recent neurology note  did not have any specific treatments that would prevent some of his numbness and tingling symptoms  did have small vessel disease on MRI, and they recommended trial for stroke prevention, i e  risk factor modification  did also have atorvastatin causing some lower extremity edema, so patient discontinued using the atorvastatin  Patient reports that after stopping the atorvastatin, the edema resolved    Of note, the patient does have a card from colorectal surgery for him to get another colonoscopy  He does have some minor fecal swelling, as well as some blood from rectum, both of which have been present for a while now  He will be making an appointment in the future  is still somewhat winded with walking  Has not cleared since the thoracic surgery  He was not sure why  He has not seen cardio recently  Review of Systems   Constitutional: No fever or chills, feels well, no tiredness, no recent weight gain or weight loss  Eyes: No complaints of eye pain, no red eyes, no discharge from eyes, no itchy eyes  ENT: no complaints of earache, no hearing loss, no nosebleeds, no nasal discharge, no sore throat, no hoarseness  Cardiovascular: No complaints of slow heart rate, no fast heart rate, no chest pain, no palpitations, no leg claudication, no lower extremity  Respiratory: No complaints of shortness of breath, no wheezing, no cough, no SOB on exertion, no orthopnea or PND  Gastrointestinal: as noted in HPI  Genitourinary: No complaints of dysuria, no incontinence, no hesitancy, no nocturia, no genital lesion, no testicular pain  Musculoskeletal: No complaints of arthralgia, no myalgias, no joint swelling or stiffness, no limb pain or swelling  Integumentary: No complaints of skin rash or skin lesions, no itching, no skin wound, no dry skin  Neurological: as noted in HPI  Psychiatric: Is not suicidal, no sleep disturbances, no anxiety or depression, no change in personality, no emotional problems  Endocrine: No complaints of proptosis, no hot flashes, no muscle weakness, no erectile dysfunction, no deepening of the voice, no feelings of weakness  Hematologic/Lymphatic: No complaints of swollen glands, no swollen glands in the neck, does not bleed easily, no easy bruising  Active Problems    1  Allergic rhinitis (477 9) (J30 9)   2  Aneurysm of thoracic aorta (441 2) (I71 2)   3  Anxiety disorder (300 00) (F41 9)   4   History of Aortic Aneurysm Repair Ascending Aorta   5  Atherosclerosis of coronary artery bypass graft(s) without angina pectoris (414 05) (I25 810)   6  Atrial fibrillation (427 31) (I48 91)   7  Atypical chest pain (786 59) (R07 89)   8  Blurry vision (368 8) (H53 8)   9  Chest pain (786 50) (R07 9)   10  Colon cancer screening (V76 51) (Z12 11)   11  Constipation (564 00) (K59 00)   12  Depression (311) (F32 9)   13  Dysconjugate gaze (378 87) (H51 8)   14  ETD (eustachian tube dysfunction) (381 81) (H69 80)   15  GERD (gastroesophageal reflux disease) (530 81) (K21 9)   16  Headache (784 0) (R51)   17  Hernia (553 9) (K46 9)   18  Hypertension (401 9) (I10)   19  Irritable bowel syndrome (564 1) (K58 9)   20  Medication management (V58 69) (Z79 899)   21  Microangiopathy (443 9) (I73 9)   22  Mixed hyperlipidemia (272 2) (E78 2)   23  Murmur (785 2) (R01 1)   24  Neoplasm of skin (239 2) (D49 2)   25  Nephrolithiasis (592 0) (N20 0)   26  Nephrolithiasis (592 0) (N20 0)   27  Numbness on left side (782 0) (R20 0)   28  Obesity, Class II, BMI 35-39 9, with comorbidity (278 00) (E66 9)   29  Palpitations (785 1) (R00 2)   30  Panic attack (300 01) (F41 0)   31  Peyronie disease (607 85) (N48 6)   32  Preop testing (V72 84) (Z01 818)   33  Prostate cancer screening (V76 44) (Z12 5)   34  Rheumatic heart disease (398 90) (I09 9)   35  Rosacea (695 3) (L71 9)   36  S/P ascending aortic replacement (V43 4) (Z95 828)   37  Sciatica (724 3) (M54 30)   38  Screening for depression (V79 0) (Z13 89)   39  Serous otitis media (381 4) (H65 90)   40  Shoulder pain (719 41) (M25 519)   41  TIA (transient ischemic attack) (435 9) (G45 9)   42  Tremor (781 0) (R25 1)   43  Vertigo (780 4) (R42)   44  Vitamin D deficiency (268 9) (E55 9)   45  Weakness of left side of body (728 87) (R53 1)    Past Medical History  1  Aneurysm of thoracic aorta (441 2) (I71 2)   2  History of D-dimer, elevated (790 92) (R79 89)   3  History of cholelithiasis (V12 79) (Z87 19)   4   History of diverticulitis of colon (V12 79) (Z87 19)   5  History of TIA (transient ischemic attack) (V12 54) (Z86 73)   6  History of Urinary tract infection, acute (599 0) (N39 0)    The active problems and past medical history were reviewed and updated today  Surgical History    1  History of Aortic Aneurysm Repair Ascending Aorta   2  History of Appendectomy   3  History of Cholecystectomy Laparoscopic   4  History of Cystoscopy With Removal Of Object   5  History of Knee Arthroscopy With Medial Meniscus Repair   6  History of Renal Lithotripsy   7  History of Thoracic Aortic Aneurysmorrhaphy    The surgical history was reviewed and updated today  Family History  Mother    1  Family history of diverticulitis of colon (V18 59) (Z83 79)   2  Family history of Nephrolithiasis  Father    3  Family history of emphysema (V17 6) (Z82 5)  Sibling    4  Family history of diabetes mellitus (V18 0) (Z83 3)   5  Family history of hypertension (V17 49) (Z82 49)   6  Family history of Hernia  Sister    7  Family history of Nephrolithiasis  Paternal Grandmother    6  Family history of breast cancer (V16 3) (Z80 3)  Maternal Grandfather    9  Family history of Myocardial infarct  Paternal Grandfather    8  Family history of lung cancer (V16 1) (Z80 1)  Family History    11  Family history of Cancer   12  Family history of Coronary Artery Disease (V17 49)   13  Family history of Death In The Family Father    The family history was reviewed and updated today  Social History     · Denied: Alcohol use   · Caffeine use (V49 89) (F15 90)   · Completed some college   ·    · Denied: Drug use (305 90) (F19 90)   · Never a smoker   · No secondhand smoke exposure (V49 89) (Z78 9)  The social history was reviewed and updated today  The social history was reviewed and is unchanged  Current Meds   1  Aspirin 325 MG Oral Tablet; TAKE 1 TABLET DAILY; Therapy: 03DPW3529 to (Evaluate:06Nov2015);  Last Rx:11Nov2014 Ordered 2  Atorvastatin Calcium 40 MG Oral Tablet; Take 1 tablet daily; Therapy: 40SVQ3687 to (Last Rometta Rehana)  Requested for: 74YDD4947 Ordered   3  Metoprolol Tartrate 25 MG Oral Tablet; TAKE 1 TABLET TWICE DAILY; Therapy: 24INZ4258 to (TPZDPNYD:78MAX8103)  Requested for: 55MUQ5843; Last IC:30XPS3917 Ordered   4  Omeprazole 40 MG Oral Capsule Delayed Release; take 1 capsule daily; Therapy: 58QBV9338 to (GXKGFYIM:39KGA0162)  Requested for: 29OKN9080; Last Rx:85Nym4300 Ordered    The medication list was reviewed and updated today  Allergies  1  TraMADol HCl ER TB24   2  Bactrim TABS    Vitals  Vital Signs    Recorded: 83XPP5825 11:08AM   Heart Rate 68   Respiration 16   Systolic 866   Diastolic 78   Height 5 ft 8 in   Weight 240 lb 6 oz   BMI Calculated 36 55   BSA Calculated 2 21       Physical Exam   Constitutional  General appearance: No acute distress, well appearing and well nourished  Pulmonary  Respiratory effort: No increased work of breathing or signs of respiratory distress  Auscultation of lungs: Clear to auscultation, equal breath sounds bilaterally, no wheezes, no rales, no rhonci  Cardiovascular  Auscultation of heart: Abnormal   The heart rate was normal  The rhythm was irregularly irregular  Heart sounds: normal S1,-- normal S2-- and-- no gallop heard  A grade 1 systolic mumur was heard in the interscapular region  Examination of extremities for edema and/or varicosities: Abnormal   bilateral ankle 1+ pitting edema-- and-- bilateral pretibial 1+ pitting edema  Future Appointments    Date/Time Provider Specialty Site   01/22/2018 10:15 AM Enrique Mendez MD Urology [address]       Signatures   Electronically signed by :  Rosi Severe, M D ; Nov 10 2017 11:47AM EST                       (Author)

## 2017-11-17 ENCOUNTER — ALLSCRIPTS OFFICE VISIT (OUTPATIENT)
Dept: OTHER | Facility: OTHER | Age: 57
End: 2017-11-17

## 2017-11-21 NOTE — PROGRESS NOTES
Assessment  Assessed   1  Aneurysm of thoracic aorta (441 2) (I71 2)  2  Atrial fibrillation (427 31) (I48 91)  3  Hypertension (401 9) (I10)  4  Palpitations (785 1) (R00 2)  5  S/P ascending aortic replacement (V43 4) (Z95 828)  6  Mixed hyperlipidemia (272 2) (E78 2)    Plan  Aneurysm of thoracic aorta, Hypertension    · Changed: From  Metoprolol Tartrate 25 MG Oral Tablet TAKE 1 TABLET TWICE DAILY ToMetoprolol Tartrate 50 MG Oral Tablet Take 1 tablet twice daily  Rx By: Maria Ines Chirinos; Dispense: 30 Days ; #:60 Tablet; Refill: 4;For: Aneurysm of thoracic aorta, Hypertension; AMARA = N; Sent To: [facility] S/P ascending aortic replacement    · Follow-up visit in 1 month Evaluation and Treatment  Follow-up  Status: Hold For -Scheduling  Requested for: 86JJA0325  Ordered; For: S/P ascending aortic replacement;  Ordered By: Maria Ines Chirinos  Performed:   Due: 04HIK4275    Discussion/Summary  Cardiology Discussion Summary Free Text Note Form [facility]:   #1  Uncontrolled hypertension: High blood pressure readings today  Needs better blood pressure control in light of microangiopathic changes on MRI brain, moderate concentric hypertrophy, and bicuspid aortic valve with history of ascending aortic aneurysm repair  increase metoprolol to 50 PO BID  Pt states he doesn't remember initiating amlodipine which was started prior visit in May 2017   1  #2  Dyslipidemia: Reviewed lipid profile from March 2017 with  mg/dL, with low HDL at 37 mg/dL  10 year risk of ASCVD is 13%, all warranting statin therapy  Atorvastatin caused myalgia, now on Rosuvastatin, tolerating well for past few days  Will f/u in 1 month and recheck lipid panel if well tolerated  Obesity: Heart healthy diet/weight lossBicuspid aortic valve with mild aortic valve stenosis: Reviewed echocardiogram from October 2016, we'll repeat an 2-3 yearsSignificant ascending aortic aneurysm, status post repair in 2014: Prior echocardiogram in October 2016 reported normal size, no ongoing symptoms  Paroxysmal atrial fibrillation: Postoperative, in 2014 after open heart surgery, no evidence of recurrence by EKGs, all reviewed since November 2014  Continue metoprolol  No indication for anticoagulation at present  No evidence of obstructive CAD by cardiac catheterization 2014  in one month       1 Amended By: Gina Garcia; Nov 20 2017 1:42 PM EST    Chief Complaint  Chief Complaint Free Text Note Form: Hypertension      History of Present Illness  Cardiology HPI Free Text Note Form [facility]: This is a 63-year-old male with a history of bicuspid aortic valve with concentric hypertrophy, mild aortic stenosis, as well as significant aortic root aneurysm, status post open repair in November 2014, with postoperative paroxysmal atrial fibrillation without recurrence  He has been following Dr Rhina Colbert in the past  Preoperative cardiac catheterization in 2014 revealed no evidence of obstructive CAD  also has a history of long-standing hypertension, and has been on metoprolol 50 mg twice daily with significant high blood pressure readings in the recent past up to 200/94 in April 2017 at which time this does not seem to have been addressed  Prior lipid panel March 2017 revealed an LDL of 150, low HDL at 37, triglycerides 117, total cholesterol 210 echocardiogram in October 2016 revealed moderate concentric hypertrophy with mild aortic valve stenosis  The aorta appeared normal in size  MRI brain in April 2017 revealed mild to moderate chronic microangiopathic ischemic disease  He underwent a Holter monitor in October 2017 secondary to a palpitations, which revealed occasional PACs and PVCs, with no evidence of dysrhythmia  Symptoms of chest pain and palpitations correlated with sinus rhythm without ectopy  Review of Systems  Cardiology Male ROS:    Cardiac: palpitations present , but-- no signs of swelling--   chest discomfort palps night time only    Respiratory: shortness of breath--   with walking distances  Neurological: no dizziness    ROS Reviewed:   ROS reviewed  Active Problems  Problems   1  Abrasion of right thumb (915 0) (S60 311A)  2  Allergic rhinitis (477 9) (J30 9)  3  Aneurysm of thoracic aorta (441 2) (I71 2)  4  Anxiety disorder (300 00) (F41 9)  5  History of Aortic Aneurysm Repair Ascending Aorta  6  Atherosclerosis of coronary artery bypass graft(s) without angina pectoris (414 05) (I25 810)  7  Atrial fibrillation (427 31) (I48 91)  8  Atypical chest pain (786 59) (R07 89)  9  Blurry vision (368 8) (H53 8)  10  BRBPR (bright red blood per rectum) (569 3) (K62 5)  11  Chest pain (786 50) (R07 9)  12  Colon cancer screening (V76 51) (Z12 11)  13  Constipation (564 00) (K59 00)  14  Depression (311) (F32 9)  15  Dysconjugate gaze (378 87) (H51 8)  16  Edema of both legs (782 3) (R60 0)  17  ETD (eustachian tube dysfunction) (381 81) (H69 80)  18  Fecal soiling (787 62) (R15 1)  19  GERD (gastroesophageal reflux disease) (530 81) (K21 9)  20  Headache (784 0) (R51)  21  Hernia (553 9) (K46 9)  22  Hypertension (401 9) (I10)  23  Irritable bowel syndrome (564 1) (K58 9)  24  Medication management (V58 69) (Z79 899)  25  Microangiopathy (443 9) (I73 9)  26  Mixed hyperlipidemia (272 2) (E78 2)  27  Murmur (785 2) (R01 1)  28  Need for influenza vaccination (V04 81) (Z23)  29  Neoplasm of skin (239 2) (D49 2)  30  Nephrolithiasis (592 0) (N20 0)  31  Nephrolithiasis (592 0) (N20 0)  32  Numbness on left side (782 0) (R20 0)  33  Obesity, Class II, BMI 35-39 9, with comorbidity (278 00) (E66 9)  34  Palpitations (785 1) (R00 2)  35  Panic attack (300 01) (F41 0)  36  Peyronie disease (607 85) (N48 6)  37  Preop testing (V72 84) (Z01 818)  38  Prostate cancer screening (V76 44) (Z12 5)  39  Rheumatic heart disease (398 90) (I09 9)  40  Rosacea (695 3) (L71 9)  41  S/P ascending aortic replacement (V43 4) (Z95 828)  42  Sciatica (724 3) (M54 30)  43   Screening for depression (V79 0) (Z13 89)  44  Serous otitis media (381 4) (H65 90)  45  Shoulder pain (719 41) (M25 519)  46  TIA (transient ischemic attack) (435 9) (G45 9)  47  Tremor (781 0) (R25 1)  48  Vertigo (780 4) (R42)  49  Vitamin D deficiency (268 9) (E55 9)  50  Weakness of left side of body (728 87) (R53 1)    Past Medical History  Problems   1  Aneurysm of thoracic aorta (441 2) (I71 2)  2  History of D-dimer, elevated (790 92) (R79 89)  3  History of cholelithiasis (V12 79) (Z87 19)  4  History of diverticulitis of colon (V12 79) (Z87 19)  5  History of TIA (transient ischemic attack) (V12 54) (Z86 73)  6  History of Urinary tract infection, acute (599 0) (N39 0)  Active Problems And Past Medical History Reviewed: The active problems and past medical history were reviewed and updated today  Surgical History  Problems   1  History of Aortic Aneurysm Repair Ascending Aorta  2  History of Appendectomy  3  History of Cholecystectomy Laparoscopic  4  History of Cystoscopy With Removal Of Object  5  History of Knee Arthroscopy With Medial Meniscus Repair  6  History of Renal Lithotripsy  7  History of Thoracic Aortic Aneurysmorrhaphy  Surgical History Reviewed: The surgical history was reviewed and updated today  Family History  Mother   1  Family history of diverticulitis of colon (V18 59) (Z83 79)  2  Family history of Nephrolithiasis  Father   3  Family history of emphysema (V17 6) (Z82 5)  Sibling   4  Family history of diabetes mellitus (V18 0) (Z83 3)  5  Family history of hypertension (V17 49) (Z82 49)  6  Family history of Hernia  Sister   7  Family history of Nephrolithiasis  Paternal Grandmother   6  Family history of breast cancer (V16 3) (Z80 3)  Maternal Grandfather   9  Family history of Myocardial infarct  Paternal Grandfather   8  Family history of lung cancer (V16 1) (Z80 1)  Family History   11  Family history of Cancer  12  Family history of Coronary Artery Disease (V17 49)  13   Family history of Death In The Family Father  Family History Reviewed: The family history was reviewed and updated today  Social History  Problems    · Denied: Alcohol use   · Caffeine use (V49 89) (F15 90)   · Completed some college   ·    · Denied: Drug use (305 90) (F19 90)   · Never a smoker   · No secondhand smoke exposure (V49 89) (Z78 9)  Social History Reviewed: The social history was reviewed and updated today  Current Meds  1  Aspirin 325 MG Oral Tablet; TAKE 1 TABLET DAILY; Therapy: 07HPW4032 to (Evaluate:05Jqr0351); Last Rx:11Nov2014 Ordered  2  Metoprolol Tartrate 25 MG Oral Tablet; TAKE 1 TABLET TWICE DAILY; Therapy: 01PKQ3917 to [phone])  Requested for: 26HVN8800; Last ZE:71ARI1691 Ordered  3  Omeprazole 40 MG Oral Capsule Delayed Release; take 1 capsule daily; Therapy: 16COA6496 to (KOIQXOVO:75IOX4198)  Requested for: 99BPY0472; Last Rx:21Jxa0198 Ordered  4  Rosuvastatin Calcium 20 MG Oral Tablet; Take one tablet by mouth daily; Therapy: 23ZYS3756 to (Darleene Appl)  Requested for: 75NQR1655; Last Rx:23Jol8378 Ordered  Medication List Reviewed: The medication list was reviewed and updated today  Allergies  Medication   1  TraMADol HCl ER TB24  2  Bactrim TABS    Vitals  Vital Signs    Recorded: 14EWA7726 01:58PM   Heart Rate 76, L Radial   Systolic 701, LUE, Sitting   Diastolic 070, LUE, Sitting   BP CUFF SIZE Large   Height 5 ft 8 in   Weight 244 lb 2 oz   BMI Calculated 37 12   BSA Calculated 2 22       Physical Exam   Constitutional - General appearance: No acute distress, well appearing and well nourished  Eyes - Conjunctiva and Sclera examination: Conjunctiva pink, sclera anicteric  Neck - Normal, no JVD   Pulmonary - Respiratory effort: No signs of respiratory distress  -- Auscultation of lungs: Clear to auscultation  Cardiovascular - Auscultation of heart: Normal rate and rhythm, normal S1 and S2, no murmurs  -- Pedal pulses: Normal, 2+ bilaterally   -- Examination of extremities for edema and/or varicosities: Normal    Abdomen - Soft  Musculoskeletal - Gait and station: Normal gait  Skin - Skin: Normal without rashes  Skin is warm and well perfused  Neurologic - Speech normal  No focal deficits  Psychiatric - Orientation to person, place, and time: Normal       Future Appointments    Date/Time Provider Specialty Site   02/22/2018 09:00 AM MAGDALENE Smith   [address] [place] [address]   12/28/2017 09:00 AM Rosangela Kitchen DO Cardiology  CARDIOLOGY  [facility]   01/22/2018 10:15 AM Sabine Cohen MD Urology [address]       Signatures   Electronically signed by : James Hoskins DO; Nov 20 2017 10:13AM EST                       (Author)    Electronically signed by : James Hoskins DO; Nov 20 2017  1:42PM EST                       (Author)

## 2018-01-09 NOTE — PSYCH
History of Present Illness  Psychotherapy Provided [facility]: Individual Psychotherapy 30 minutes minutes provided today  Goals addressed in session:   Patient denies any acute issues  Mood has been stable  Under some stress due to finances but managing  Remains active with hobbies and interests  Maintains social contact/activity  HPI - Psych: Patient denies any depressive symptoms  No SI  Has been under stress due to home maintenance issues an cost of repairs but has no choice in matter  Managing appropriately   Note   Note:   Reviewed appropriate stress mgmt and mood coping skills to continue to utilize  Provided positive reinforcement for efforts  Next session: 11/28/16  Assessment    1   Anxiety disorder (300 00) (F41 9)    Signatures   Electronically signed by : Tati Gaytan LCSW; Nov 11 2016 11:07AM EST                       (Author)

## 2018-01-10 NOTE — PSYCH
History of Present Illness  Psychotherapy Provided [facility]: Individual Psychotherapy 25 minutes minutes provided today  Goals addressed in session:   Patient reports some issues with variable energy and motivation but feels depression has been stable  Financial and seasonal issues contribute  Patient verbal and cooperative  HPI - Psych: Patient denies any acute issues other than some issues with energy and motivation  Despite this, maintains some consistent pattern of social contact/activity via family and friends  Denies any SI   Note   Note:   Reviewed coping skills to manage issues with energy and motivation  Will see him in one month  Agrees to contact me sooner if needed  Assessment    1   Depression (311) (F32 9)    Signatures   Electronically signed by : Ruth Byrne LCSW; Jan 13 2017  9:29AM EST                       (Author)

## 2018-01-10 NOTE — RESULT NOTES
Message   small vessel diease , consistent with age, no evidence of an infarct , can proceed with physchology eval      Verified Results  * MRI BRAIN WO CONTRAST 22Jan2016 10:11AM Ricardo Jernigan     Test Name Result Flag Reference   MRI BRAIN WO CONTRAST (Report)     MRI BRAIN WITHOUT CONTRAST     INDICATION: 781 0, R25 1  left hand tremor for 1 year following tia  evaluate for underlying small vessel BG stroke History:Left arm and bilat leg tremors since 11/2014  COMPARISON:  12/8/2014     TECHNIQUE: Sagittal T1, axial T2, axial FLAIR, axial T1, axial gradient imaging and axial diffusion imaging  IMAGE QUALITY: Diagnostic  FINDINGS:     BRAIN PARENCHYMA: There is no discrete mass, mass effect or midline shift  Scattered subcortical and periventricular FLAIR hyperintensities noted, somewhat more confluent in the periventricular region, nonspecific  No diffusion restriction  Brainstem and cerebellum demonstrate normal signal  There is no intracranial hemorrhage  There is no evidence of acute infarction and diffusion imaging is unremarkable  VENTRICLES: The ventricles are normal in size and contour  SELLA AND PITUITARY GLAND: Normal      ORBITS: Normal      PARANASAL SINUSES: Moderate bilateral ethmoidal, frontal and maxillary mucosal signal abnormality, most pronounced in the right frontal and ethmoidal regions  VASCULATURE: Evaluation of the major intracranial vasculature demonstrates appropriate flow voids  CALVARIUM AND SKULL BASE: Normal      EXTRACRANIAL SOFT TISSUES: Normal        IMPRESSION:       1  Mild to moderate nonspecific periventricular and subcortical white matter signal abnormality throughout the supratentorial brain possibly early changes of microangiopathy in the appropriate clinical setting  Other postinfectious, postinflammatory or   demyelinating processes are in the differential diagnosis  Further clinical correlation and follow-up suggested     2  No acute infarction, intracranial hemorrhage or mass effect   3  Moderate sinus disease as described  Signed by:    Fidelina Denise MD   1/22/16

## 2018-01-10 NOTE — RESULT NOTES
Verified Results  * MRI CERVICAL SPINE W WO CONTRAST [id number] 06:48AM [facility]     Test Name Result Flag Reference   MRI CERVICAL SPINE W [address] (Report)     MRI CERVICAL SPINE WITH AND WITHOUT CONTRAST     INDICATION: Left facial numbness  COMPARISON: None  TECHNIQUE: Sagittal T1, sagittal T2, sagittal inversion recovery, axial 2D merge and axial T2  Sagittal T1 and axial T1 postcontrast       IV Contrast: 10 mL of gadobutrol injection (MULTI-DOSE)      IMAGE QUALITY: Diagnostic  FINDINGS:     ALIGNMENT: Normal alignment of the cervical spine  No compression fracture  No subluxation  No scoliosis  MARROW SIGNAL: Moderate sized T3 hemangioma  CERVICAL AND VISUALIZED UPPER THORACIC CORD: Normal signal within the visualized cord  PREVERTEBRAL AND PARASPINAL SOFT TISSUES: Normal          VISUALIZED POSTERIOR FOSSA: The visualized posterior fossa demonstrates no abnormal signal      CERVICAL DISC SPACES:        C2-C3: Normal      C3-C4: Normal      C4-C5: Normal      C5-C6: Normal      C6-C7: Normal      C7-T1: Normal      UPPER THORACIC DISC SPACES: Normal      POSTCONTRAST IMAGING: Normal        IMPRESSION:     Unremarkable MRI of the cervical spine               Workstation performed: [id number]     Signed by:   Adela Rivas DO   9/11/17

## 2018-01-11 NOTE — PSYCH
History of Present Illness  Psychotherapy Provided  [facility]: Individual Psychotherapy 30 minutes minutes provided today  Goals addressed in session:   Patient denies any acute issues  Mood is stable  Has continued ssues wit anxiety at times but is much more active than when previously seen  Patient verbal and cooperative  HPI - Psych: Patient HS long History of anxiety and mood issues that are currently well managed  Patient has been much more active exercising three times a week , making daily efforts to get out of house and be around people and has been more consistent having contact with his social supports  Denies feeling depressed  No SI  Anxiety stable   Note   Note:   Provided positive reinforcement for his efforts  Encouraged him to expand upon them with upcoming time change and his seasonal issues and he agrees  Assessment    1   Anxiety disorder (300 00) (F41 9)    Signatures   Electronically signed by : Helio Mallory LCSW; Oct 21 2016  1:56PM EST                       (Author)

## 2018-01-11 NOTE — PROGRESS NOTES
History of Present Illness  ED Outreach:   ED Visit Information  ED visit date: 8/26/2017  Diagnosis description: ANESTHESIA OF SKIN/ LEFT SIDED NUMBNESS  Facility name: [facility]  Discharge status: ADMITTED  Number of ED visits to date: 1  ED severity: 5  In network  Outreach Information  Outreach not needed  Date finalized: 9/11/2017  Care Coordination  Emergent necessity warranted by diagnosis  [facility] PCP  Follow up appointment with PCP  Date and time of follow up: 9/1/17  Pt admitted from ED  [facility] scheduled  Comments:   Admitted 8/26/2017 to 8/28/2017 BAILEY 9/1/2017        Future Appointments    Date/Time Provider Specialty Site   11/02/2017 03:45 PM Rudy Campa [facility] Neurology [facility]  [place]   01/22/2018 10:15 AM Enrique Mendez MD Urology [address]     Signatures   Electronically signed by : Fernanda Worrell, ; Sep 11 2017  2:42PM EST                       (Author)

## 2018-01-11 NOTE — PSYCH
History of Present Illness  Psychotherapy Provided [facility]: Individual Psychotherapy 30 minutes minutes provided today  Goals addressed in session:   Patient denies any acute issues  Still has periods of depression stemming from financial and inactivity issues  Patient verbal and cooperative  HPI - Psych: Patient continues to detail some issues with lack of energy and motivation  Had become sedentary since winter  With onset of nicer weather, he ha been more active with maintenance issues at home  Has found his physical stamina has suffered  Denies any SI   Note   Note:   Encouraged him to begin walking daily in addition to his activities at home to generate more energy and interests and improve stamina as well  Agrees to do so  Will assess status in two weeks  Assessment    1   Depression (311) (F32 9)    Signatures   Electronically signed by : Kristel Rowell LCSW; Apr 10 2017 11:56AM EST                       (Author)

## 2018-01-11 NOTE — PSYCH
History of Present Illness  Psychotherapy Provided  [facility]: Individual Psychotherapy 30 minutes minutes provided today  Goals addressed in session:   Patient denies any acute issues  Mood stable  Finds winter months difficult with his mood/depression  Has been more active with some work and has been trying to make social contact more consistent when finances allow  Patient verbal and cooperative  HPI - Psych: Patient denies any acute issues  Had been having issues with lack of energy and motivation but this has improved despite not yet starting increased dose of antidepressant  has been more active in and outside of home  Denies any SI   Note   Note:   Reviewed coping strategies to continue to utilize to manage depression/seasonal issues  Will assess after holidays but he agrees to contact me sooner if needed  Assessment    1   Depression (311) (F32 9)    Signatures   Electronically signed by : Jarrett Mayen LCSW; Dec 16 2016 11:53AM EST                       (Author)

## 2018-01-11 NOTE — PSYCH
History of Present Illness  Psychotherapy Provided [facility]: Individual Psychotherapy 30 minutes minutes provided today  Goals addressed in session:   Patient continues to experience stress secondary to financial issues which affects his mood  Continues to experience periods of depression  Has been getting more dog watching activities so this has been helpful  Patient reports to me that he has been having issues with dizziness, particularly when standing after sitting  Also reports some vision issues last week as well  Consulted with MA and she scheduled him to see PCP this afternoon at 2:45PM  Patient verbal and cooperative  HPI - Psych: Patient denies any acute issues  Continues to have periods where he struggles with lack of energy and motivation  This tends to lessen as he become more active as weather improves  Denies any SI   Note   Note:   Reviewed coping strategies for depression and encouraged him to increase utilization of social outlets/contacts and he agrees  He agrees to se PCP this afternoon for his issues with dizziness  Will see him again on 4/10/17  Assessment    1   Depression (311) (F32 9)    Signatures   Electronically signed by : Edith Lisa LCSW; Mar 27 2017 10:40AM EST                       (Author)

## 2018-01-12 VITALS
RESPIRATION RATE: 16 BRPM | HEIGHT: 68 IN | SYSTOLIC BLOOD PRESSURE: 158 MMHG | DIASTOLIC BLOOD PRESSURE: 92 MMHG | HEART RATE: 60 BPM | WEIGHT: 234.5 LBS | BODY MASS INDEX: 35.54 KG/M2

## 2018-01-12 NOTE — PSYCH
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 while also creating continued financial stress  Seems to have decreased frustration tolerance with family  Related to his mood issues but also their behavioral issues  Patient verbal and cooperative  HPI - Psych: Patient details some periods where he has issues with decreased energy, motivation  Some irritability issues but seems isolated to family  Denies any SI  Has been active with spots viewing and this has helped manage stress  Has benny largely sedentary   Note   Note:   Reviewed need to increase physical activity including walking to generate some energy and interest  Hopefully, 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

## 2018-01-12 NOTE — RESULT NOTES
Message   Cholesterol is quite a bit abnormal   Would recommend office visit to review  Verified Results  (1) COMPREHENSIVE METABOLIC PANEL [id number] 29:14BE [facility]   REPORT COMMENT:  FASTING:YES     Test Name Result Flag Reference   GLUCOSE 97 mg/dL  65-99   Fasting reference interval   UREA NITROGEN (BUN) 10 mg/dL  7-25   CREATININE 1 29 mg/dL  0 70-1 33   For patients >52years of age, the reference limit  for Creatinine is approximately 13% higher for people  identified as -American  eGFR NON-AFR  AMERICAN 61 mL/min/1 73m2  > OR = 60   eGFR AFRICAN AMERICAN 71 mL/min/1 73m2  > OR = 60   BUN/CREATININE RATIO   8-50   NOT APPLICABLE (calc)   ALT 16 U/L  9-46   ALBUMIN 4 0 g/dL  3 6-5 1   GLOBULIN 2 7 g/dL (calc)  1 9-3 7   ALBUMIN/GLOBULIN RATIO 1 5 (calc)  1 0-2 5   BILIRUBIN, TOTAL 1 5 mg/dL H 0 2-1 2   ALKALINE PHOSPHATASE 119 U/L H    AST 18 U/L  10-35   SODIUM 140 mmol/L  135-146   POTASSIUM 4 0 mmol/L  3 5-5 3   CHLORIDE 102 mmol/L     CARBON DIOXIDE 34 mmol/L H 20-31   CALCIUM 9 1 mg/dL  8 6-10 3   PROTEIN, TOTAL 6 7 g/dL  6 1-8 1     (Q) LIPID PANEL WITH REFLEX TO DIRECT LDL [id number] 09:14AM [facility]     Test Name Result Flag Reference   CHOLESTEROL, TOTAL 210 mg/dL H 125-200   HDL CHOLESTEROL 37 mg/dL L > OR = 40   TRIGLICERIDES 785 mg/dL  <150   LDL-CHOLESTEROL 150 mg/dL (calc) H <130   Desirable range <100 mg/dL for patients with CHD or  diabetes and <70 mg/dL for diabetic patients with  known heart disease  CHOL/HDLC RATIO 5 7 (calc) H < OR = 5 0   NON HDL CHOLESTEROL 173 mg/dL (calc) H    Target for non-HDL cholesterol is 30 mg/dL higher than   LDL cholesterol target

## 2018-01-13 VITALS
BODY MASS INDEX: 37 KG/M2 | SYSTOLIC BLOOD PRESSURE: 140 MMHG | WEIGHT: 244.13 LBS | HEART RATE: 76 BPM | DIASTOLIC BLOOD PRESSURE: 100 MMHG | HEIGHT: 68 IN

## 2018-01-13 VITALS
HEIGHT: 68 IN | BODY MASS INDEX: 35.54 KG/M2 | WEIGHT: 234.5 LBS | HEART RATE: 60 BPM | DIASTOLIC BLOOD PRESSURE: 80 MMHG | SYSTOLIC BLOOD PRESSURE: 132 MMHG

## 2018-01-13 VITALS
SYSTOLIC BLOOD PRESSURE: 164 MMHG | RESPIRATION RATE: 14 BRPM | DIASTOLIC BLOOD PRESSURE: 98 MMHG | BODY MASS INDEX: 35.61 KG/M2 | HEIGHT: 68 IN | WEIGHT: 235 LBS | HEART RATE: 58 BPM | OXYGEN SATURATION: 96 %

## 2018-01-13 VITALS
WEIGHT: 232.5 LBS | HEIGHT: 68 IN | DIASTOLIC BLOOD PRESSURE: 90 MMHG | SYSTOLIC BLOOD PRESSURE: 142 MMHG | TEMPERATURE: 96.9 F | HEART RATE: 76 BPM | BODY MASS INDEX: 35.24 KG/M2

## 2018-01-13 VITALS
HEIGHT: 68 IN | WEIGHT: 234.25 LBS | BODY MASS INDEX: 35.5 KG/M2 | SYSTOLIC BLOOD PRESSURE: 140 MMHG | DIASTOLIC BLOOD PRESSURE: 88 MMHG | RESPIRATION RATE: 16 BRPM | HEART RATE: 56 BPM

## 2018-01-13 VITALS
DIASTOLIC BLOOD PRESSURE: 90 MMHG | SYSTOLIC BLOOD PRESSURE: 142 MMHG | WEIGHT: 233.5 LBS | HEIGHT: 68 IN | BODY MASS INDEX: 35.39 KG/M2 | HEART RATE: 72 BPM

## 2018-01-13 NOTE — MISCELLANEOUS
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

## 2018-01-13 NOTE — RESULT NOTES
Verified Results  (Q) LYME DISEASE AB, TOTAL W/REFL WB (IGG, IGM) [id number] 11:06AM Marya Tapia     Test Name Result Flag Reference   LYME AB SCREEN <0 90 index     Index                Interpretation                     -----                --------------                     < 0 90               Negative                     0  90-1 09            Equivocal                     > 1 09               Positive      As recommended by the Food and Drug Administration   (FDA), all samples with positive or equivocal   results in a Borrelia burgdorferi antibody screen  will be tested using a blot method  Positive or   equivocal screening test results should not be   interpreted as truly positive until verified as such   using a supplemental assay (e g , B  burgdorferi blot)  The screening test and/or blot for B  burgdorferi   antibodies may be falsely negative in early stages  of Lyme disease, including the period when erythema   migrans is apparent

## 2018-01-14 VITALS
HEART RATE: 53 BPM | RESPIRATION RATE: 16 BRPM | BODY MASS INDEX: 35.16 KG/M2 | HEIGHT: 68 IN | WEIGHT: 232 LBS | DIASTOLIC BLOOD PRESSURE: 96 MMHG | SYSTOLIC BLOOD PRESSURE: 150 MMHG

## 2018-01-14 VITALS
WEIGHT: 233 LBS | HEART RATE: 60 BPM | SYSTOLIC BLOOD PRESSURE: 200 MMHG | DIASTOLIC BLOOD PRESSURE: 94 MMHG | BODY MASS INDEX: 35.43 KG/M2

## 2018-01-14 VITALS
RESPIRATION RATE: 16 BRPM | DIASTOLIC BLOOD PRESSURE: 92 MMHG | WEIGHT: 240 LBS | HEART RATE: 68 BPM | BODY MASS INDEX: 36.37 KG/M2 | HEIGHT: 68 IN | SYSTOLIC BLOOD PRESSURE: 158 MMHG

## 2018-01-15 VITALS
BODY MASS INDEX: 36.43 KG/M2 | WEIGHT: 240.38 LBS | HEART RATE: 68 BPM | RESPIRATION RATE: 16 BRPM | SYSTOLIC BLOOD PRESSURE: 132 MMHG | DIASTOLIC BLOOD PRESSURE: 78 MMHG | HEIGHT: 68 IN

## 2018-01-15 VITALS
SYSTOLIC BLOOD PRESSURE: 140 MMHG | HEIGHT: 68 IN | HEART RATE: 64 BPM | DIASTOLIC BLOOD PRESSURE: 84 MMHG | BODY MASS INDEX: 35.68 KG/M2 | WEIGHT: 235.4 LBS

## 2018-01-15 NOTE — RESULT NOTES
Verified Results  HOLTER MONITOR - Renay [id number] 08:51AM Juan Daniel Reddy Order Number: [id number]    - Patient Instructions: To schedule this appointment, please contact Central Scheduling at [phone]  Test Name Result Flag Reference   HOLTER MONITOR - 24 HOUR (Report)     INDICATIONS: afib     DESCRIPTION OF FINDINGS:   The patient was monitored for a total of 24 hours  The patient was predominantly noted to be in NSR throughout the study  The average heart rate was 64 beats per minute  The heart rate ranged from a low of 47 beats per minute in AM at 3:12 to a    maximum of 108 beats per minute in PM at 6:35  Ventricular ectopic activity consisted of 46 (0 0% of total beats)  There was no sustained or nonsustained ventricular tachycardia  Supraventricular ectopic activity consisted of 39 (0 0% of total beats)  There was no supraventricular tachycardia identified  There was no evidence of atrial fibrillation or atrial flutter  There were no significant pauses  The longest R-R interval was 1 5 seconds  There was no evidence of advanced degree heart block  The accompanying patient diary notes symptoms of palpitations and chest tightness  Correlation with the tracings at these times reveals NSR      1  Predominantly NSR at varying rates  2   Low density of PVC's  3   Low density of PAC's  4   No significant bradycardia  5   Symptoms as noted correlated with NSR

## 2018-01-15 NOTE — PSYCH
History of Present Illness  Psychotherapy Provided [facility]: Individual Psychotherapy 25 minutes minutes provided today  Goals addressed in session:   Patient continues to report periods of depression characterized by depressed mood, feelings of sadness and decreased energy and decreased energy and motivation  Tends to be more active in March with hobbies and interests as spring approaches  Seasonal component/effects beginning to diminish  HPI - Psych: Patient reports aforementioned issues  No SI  Has some increase family stress with sister residing with him  This has contributed to some mood issues   Note   Note:   Reviewed coping strategies for mood and stress issues in two weeks  Assessment    1   Depression (311) (F32 9)    Signatures   Electronically signed by : Dorota Aguilar LCSW; Feb 20 2017 10:31AM EST                       (Author)

## 2018-01-15 NOTE — PSYCH
History of Present Illness  Psychotherapy Provided [facility]: Individual Psychotherapy 30 minutes minutes provided today  Goals addressed in session:   Patient has long history of depression and anxiety and had been seen at [facility]  PCP now managing medications  Has not engaged in counseling and care manager at [facility] recommended this service  Patient verbal and cooperative  HPI - Psych: Patient denies any acute issues  Recent;y, he has reconnected with hobbies and interests and has also reconnected with these social contacts  Aware of importance of same from a mood ad anxiety perspective  Social anxiety remains issue  Denies any SI   Note   Note:   Agreed to meet patient for monthly therapy sessions  Provided supportive therapy  Reviewed coping strategies to continue to employ to manage any symptoms  Will see in a month and he agrees to contact me sooner if needed  Assessment    1  Depression (311) (F32 9)   2   Atrial fibrillation (427 31) (I48 91)    Signatures   Electronically signed by : Facundo Magallanes LCSW; Sep 19 2016  1:57PM EST                       (Author)

## 2018-01-15 NOTE — MISCELLANEOUS
Assessment    1  Numbness on left side (782 0) (R20 0)   2  Weakness of left side of body (728 87) (R53 1)   3  Aneurysm of thoracic aorta (441 2) (I71 2)   4  Atrial fibrillation (427 31) (I48 91)   5  Atherosclerosis of coronary artery bypass graft(s) without angina pectoris (414 05)   (I25 810)    Plan  Aneurysm of thoracic aorta, Atherosclerosis of coronary artery bypass graft(s) without  angina pectoris, Atrial fibrillation    · 1 - Danae Easton DO  Cardiology Co-Management  *  Status: Active  Requested for:  01Sep2017   Ordered; For: Aneurysm of thoracic aorta, Atherosclerosis of coronary artery bypass graft(s) without angina pectoris, Atrial fibrillation; Ordered By: Masha Freed Performed:  Due: 18FAW8054  Care Summary provided  : Yes  Numbness on left side, Weakness of left side of body    · * MRI CERVICAL SPINE W WO CONTRAST; Status:Need Information - Financial  Authorization; Requested RVB:[id number];    Perform:[facility] Radiology; Due:69Yrm7318;Ordered; ASAP;  For:Numbness on left side, Weakness of left side of body; Ordered By:Venkat Gordon;   · 1 Zahida Gamboa MD, Erika Echavarria (Neurology) Co-Management  *  Status: Active  Requested for:  64Oem5014   Ordered; For: Numbness on left side, Weakness of left side of body; Ordered By: Masha Freed Performed:  Due: 27PWJ2305  Care Summary provided  : Yes    Discussion/Summary  Discussion Summary:   #1: Numbness on the left side: Patient was in the hospital for this  Neurology in the hospital recommended that he have an MRI of the C-spine with and without contrast  This was not performed while he was there, so for follow-up he should have this done, but he also continues to have some numbness and weakness on the left side of the body completely  #2: Weakness of left side of the body: Please see #1  Patient continues to have weakness as documented on today's exam  This is in the upper extremity, he also has some lower extremity at times   It is minimal, but definitely present  Given that his symptoms continued, the next step is the MRI as recommended by neurology, and then follow-up with neurology afterwards  #3: Thoracic aortic aneurysm: Patient following with CT surgery  No changes at the moment  Doing quite well  #4: Atrial fibrillation: Exam today showed a slightly bradycardic heart, but overall doing quite well  Follows with cardiology  #4: CAD: Stable  Follows with cardiology  Medication SE Review and Pt Understands Tx: Possible side effects of new medications were reviewed with the patient/guardian today  The treatment plan was reviewed with the patient/guardian  The patient/guardian understands and agrees with the treatment plan      Chief Complaint  Chief Complaint Free Text Note Form: Hospital Follow up re: Left sided numbness  Pt had testing to r/o a Stroke and all tests were normal  They would like him to have a MRI to r/o a pinched nerve in the neck but asked he do that Outpatient  kw      History of Present Illness  TCM Communication [facility]: The patient is being contacted for follow-up after hospitalization  He was hospitalized at [facility]  The date of admission: 08/26/2017, date of discharge: 08/28/2017  Diagnosis: stroke  He was discharged to home  He scheduled a follow up appointment  Communication performed and completed by Karime Melgoza   HPI: Patient was admitted to [place] [address] for numbness, tingling  He had multiple tests there, and they recommended on discharge that he have a MRI of the neck  Reviewed hospital discharge summary  It did show that he had left side numbness  He did have a consult with neurology, as well as a head CT and an MRI of the brain  Echo was also done showing ejection fraction of 60% with grade 1 diastolic dysfunction, and mild tricuspid regurgitation  Carotid ultrasound was done, but the results are not available at discharge  They did recommend an MRI of the C-spine on discharge    Currently, he has some numbness in the left thigh, left forearm, and on the left side of the face  He did mention that he has some changes in his vision at times with this as well, including some numbness around the eye on the left  He does deny weakness with this  I did review the MRI results from [facility], and there were no significant abnormalities, and unchanged from prior  CT scan did show sinus disease, but no acute sinusitis  Otherwise it was negative  Carotid arteries were normal bilaterally  With regard to CAD: Patient is not currently having any symptoms  Feels quite good  For his atrial fibrillation, he is setting up for cardiology for follow-up  Has been doing quite well  The the resting aneurysm was cleared to be two-year visit with vascular/CT surgery  Review of Systems  Complete-Male:   Constitutional: No fever or chills, feels well, no tiredness, no recent weight gain or weight loss  Eyes: as noted in HPI    ENT: no complaints of earache, no hearing loss, no nosebleeds, no nasal discharge, no sore throat, no hoarseness  Cardiovascular: No complaints of slow heart rate, no fast heart rate, no chest pain, no palpitations, no leg claudication, no lower extremity  Respiratory: No complaints of shortness of breath, no wheezing, no cough, no SOB on exertion, no orthopnea or PND  Gastrointestinal: No complaints of abdominal pain, no constipation, no nausea or vomiting, no diarrhea or bloody stools  Genitourinary: No complaints of dysuria, no incontinence, no hesitancy, no nocturia, no genital lesion, no testicular pain  Musculoskeletal: as noted in HPI  Active Problems    1  Allergic rhinitis (477 9) (J30 9)   2  Aneurysm of thoracic aorta (441 2) (I71 2)   3  Anxiety disorder (300 00) (F41 9)   4  History of Aortic Aneurysm Repair Ascending Aorta   5  Atherosclerosis of coronary artery bypass graft(s) without angina pectoris (414 05)   (I25 810)   6  Atrial fibrillation (427 31) (I48 91)   7   Atypical chest pain (786 59) (R07 89)   8  Blurry vision (368 8) (H53 8)   9  Chest pain (786 50) (R07 9)   10  Colon cancer screening (V76 51) (Z12 11)   11  Constipation (564 00) (K59 00)   12  Depression (311) (F32 9)   13  Dysconjugate gaze (378 87) (H51 8)   14  ETD (eustachian tube dysfunction) (381 81) (H69 80)   15  GERD (gastroesophageal reflux disease) (530 81) (K21 9)   16  Headache (784 0) (R51)   17  Hernia (553 9) (K46 9)   18  Hypertension (401 9) (I10)   19  Irritable bowel syndrome (564 1) (K58 9)   20  Medication management (V58 69) (Z79 899)   21  Microangiopathy (443 9) (I73 9)   22  Mixed hyperlipidemia (272 2) (E78 2)   23  Murmur (785 2) (R01 1)   24  Neoplasm of skin (239 2) (D49 2)   25  Nephrolithiasis (592 0) (N20 0)   26  Nephrolithiasis (592 0) (N20 0)   27  Obesity, Class II, BMI 35-39 9, with comorbidity (278 00) (E66 9)   28  Palpitations (785 1) (R00 2)   29  Panic attack (300 01) (F41 0)   30  Peyronie disease (607 85) (N48 6)   31  Preop testing (V72 84) (Z01 818)   32  Prostate cancer screening (V76 44) (Z12 5)   33  Rheumatic heart disease (398 90) (I09 9)   34  Rosacea (695 3) (L71 9)   35  S/P ascending aortic replacement (V43 4) (Z95 828)   36  Sciatica (724 3) (M54 30)   37  Serous otitis media (381 4) (H65 90)   38  Shoulder pain (719 41) (M25 519)   39  TIA (transient ischemic attack) (435 9) (G45 9)   40  Tremor (781 0) (R25 1)   41  Vertigo (780 4) (R42)   42  Vitamin D deficiency (268 9) (E55 9)    Past Medical History    1  Aneurysm of thoracic aorta (441 2) (I71 2)   2  History of D-dimer, elevated (790 92) (R79 89)   3  History of cholelithiasis (V12 79) (Z87 19)   4  History of diverticulitis of colon (V12 79) (Z87 19)   5  History of TIA (transient ischemic attack) (V12 54) (Z86 73)   6  History of Urinary tract infection, acute (599 0) (N39 0)    Surgical History    1  History of Aortic Aneurysm Repair Ascending Aorta   2  History of Appendectomy   3   History of Cholecystectomy Laparoscopic   4  History of Cystoscopy With Removal Of Object   5  History of Knee Arthroscopy With Medial Meniscus Repair   6  History of Renal Lithotripsy   7  History of Thoracic Aortic Aneurysmorrhaphy  Surgical History Reviewed: The surgical history was reviewed and updated today  Family History  Mother    1  Family history of diverticulitis of colon (V18 59) (Z83 79)   2  Family history of Nephrolithiasis  Father    3  Family history of emphysema (V17 6) (Z82 5)  Sibling    4  Family history of diabetes mellitus (V18 0) (Z83 3)   5  Family history of hypertension (V17 49) (Z82 49)   6  Family history of Hernia  Sister    7  Family history of Nephrolithiasis  Paternal Grandmother    6  Family history of breast cancer (V16 3) (Z80 3)  Maternal Grandfather    9  Family history of Myocardial infarct  Paternal Grandfather    8  Family history of lung cancer (V16 1) (Z80 1)  Family History    11  Family history of Cancer   12  Family history of Coronary Artery Disease (V17 49)   13  Family history of Death In The Family Father  Family History Reviewed: The family history was reviewed and updated today  Social History    · Denied: Alcohol use   · Caffeine use (V49 89) (F15 90)   · Completed some college   ·    · Denied: Drug use (305 90) (F19 90)   · Never a smoker   · No secondhand smoke exposure (V49 89) (Z78 9)  Social History Reviewed: The social history was reviewed and updated today  The social history was reviewed and is unchanged  Current Meds   1  Aspirin 325 MG Oral Tablet; TAKE 1 TABLET DAILY; Therapy: 89AIM9707 to (Evaluate:06Nov2015); Last Rx:11Nov2014 Ordered   2  Atorvastatin Calcium 40 MG Oral Tablet; Take 1 tablet daily; Therapy: 27ENP5127 to  Requested for: 85Pzd1049 Recorded   3  Metoprolol Tartrate 25 MG Oral Tablet; TAKE 1 TABLET TWICE DAILY; Therapy: 41WUF4177 to (KSADVFFY:37GRW4890)  Requested for: 46FYU5039; Last   IW:27TVF3243 Ordered   4  Omeprazole 40 MG Oral Capsule Delayed Release; take 1 capsule daily; Therapy: 39IIS8626 to (YRGFQIXO:74JDM8461)  Requested for: 71Tnr3231; Last   Rx:34Lmb7614 Ordered  Medication List Reviewed: The medication list was reviewed and updated today  Allergies    1  TraMADol HCl ER TB24   2  Bactrim TABS    Vitals  Signs   Recorded: 01Sep2017 09:02AM   Heart Rate: 64  Respiration: 16  Systolic: 053  Diastolic: 98  Height: 5 ft 8 in  Weight: 236 lb 2 oz  BMI Calculated: 35 9  BSA Calculated: 2 19    Physical Exam    Constitutional   General appearance: No acute distress, well appearing and well nourished  Eyes   Conjunctiva and lids: No swelling, erythema, or discharge  Pupils and irises: Equal, round and reactive to light  Ears, Nose, Mouth, and Throat   External inspection of ears and nose: Normal     Otoscopic examination: Tympanic membrance translucent with normal light reflex  Canals patent without erythema  Nasal mucosa, septum, and turbinates: Normal without edema or erythema  Oropharynx: Normal with no erythema, edema, exudate or lesions  Pulmonary   Respiratory effort: No increased work of breathing or signs of respiratory distress  Auscultation of lungs: Clear to auscultation, equal breath sounds bilaterally, no wheezes, no rales, no rhonci  Cardiovascular   Auscultation of heart: Normal rate and rhythm, normal S1 and S2, without murmurs  Carotid pulses: Normal     Abdomen   Abdomen: Non-tender, no masses  Liver and spleen: No hepatomegaly or splenomegaly  Musculoskeletal   Gait and station: Normal     Digits and nails: Normal without clubbing or cyanosis  Inspection/palpation of joints, bones, and muscles: Abnormal   (Some weakness in the upper extremity on the left  Since with extension, flexion at the elbow, as well as at the shoulder) Appearance - normal    Neurologic   Cranial nerves: Cranial nerves 2-12 intact  Reflexes: 2+ and symmetric      Sensation: No sensory loss  Future Appointments    Date/Time Provider Specialty Site   01/22/2018 10:15 AM Heriberto Santizo MD Urology [address]     Signatures   Electronically signed by :  MAGDALENE Larry ; Sep  1 2017  9:38AM EST                       (Author)

## 2018-01-15 NOTE — PSYCH
History of Present Illness  Psychotherapy Provided  [facility]: Individual Psychotherapy 25 minutes minutes provided today  Goals addressed in session:   Patient reports he feels he has been somewhat more depressed  Dealing with decreased energy and motivation  More apathetic  Patient verbal and cooperative  HPI - Psych: Patient reports aforementioned symptoms  Feels it may coincide with colder weather  Experiences some stress due to finances and mother's aging/health issues but has been managing stress appropriately  Denies any mood lability  Denies any SI  Still engaging in some social contact and activity  Not as active addressing home maintenance issues   Note   Note:   Encouraged to push self to engage in more physical activity/walking as well as social contact/activity in an effort to generate more energy and interest and he agrees  Will assess status at next appt on 12/16/16  Agrees to contact me sooner if needed  Assessment    1   Depression (311) (F32 9)    Signatures   Electronically signed by : Hector Blackwood LCSW; Nov 28 2016  1:27PM EST                       (Author)

## 2018-01-15 NOTE — MISCELLANEOUS
Provider Comments  Provider Comments:   Patient no-showed for his session with me today 9:30AM       Signatures   Electronically signed by : Jonah Latham LCSW; Sep  9 2016  9:54AM EST                       (Author)

## 2018-01-16 ENCOUNTER — TRANSCRIBE ORDERS (OUTPATIENT)
Dept: ADMINISTRATIVE | Facility: HOSPITAL | Age: 58
End: 2018-01-16

## 2018-01-16 ENCOUNTER — APPOINTMENT (OUTPATIENT)
Dept: RADIOLOGY | Facility: MEDICAL CENTER | Age: 58
End: 2018-01-16
Payer: COMMERCIAL

## 2018-01-16 DIAGNOSIS — N20.0 CALCULUS OF KIDNEY: ICD-10-CM

## 2018-01-16 PROCEDURE — 74018 RADEX ABDOMEN 1 VIEW: CPT

## 2018-01-16 NOTE — PSYCH
History of Present Illness  Psychotherapy Provided [facility]: Individual Psychotherapy 25 minutes minutes provided today  Goals addressed in session:   Patient reports mood and affect has been neutral  Having some good and bad days  Frustrated with physcial health issues  Financial and family issues continue ti create stress for him  Will be busier with work after [employer]  Patient verbal and cooperative  HPI - Psych: Patient continues to report periods where he struggles with decreased energy and motivation  Physical activity has benny limited due to continued physical health issues  Sees cardiologist next week and will discuss issues at that time  Denies any SI   Note   Note:   Reviewed coping strategies for mood and stress issues  Agrees to contact me to provide update after seeing cardiologist  Will see him again in three weeks      Assessment    1   Depression (311) (F32 9)    Signatures   Electronically signed by : La Baron LCSW; May 15 2017 10:06AM EST                       (Author)

## 2018-01-16 NOTE — PSYCH
History of Present Illness  Psychotherapy Provided  [facility]: Individual Psychotherapy 25 minutes minutes provided today  Goals addressed in session:   Patient reports some continued issues with depression  Hopes this will decrease as activity increases with onset of nicer weather and increased work with dogs  Patient verbal and cooperative  HPI - Psych: Patient continues to experience continued issues with depression  Continues to have financial issues and stress dealing with family  Has limited supports  Denies any SI  Remains active with hobbies and interests   Note   Note:   Reviewed coping strategies for depression and he agrees to utilize same  Will assess status in two weeks,      Assessment    1   Depression (311) (F32 9)    Signatures   Electronically signed by : Mark Shearer LCSW; Mar  6 2017 10:32AM EST                       (Author)    Electronically signed by : Mark Shearer LCSW; Mar  6 2017  1:23PM EST                       (Author)

## 2018-01-17 NOTE — RESULT NOTES
Verified Results  * MRI BRAIN [facility] WO AND W CONTRAST 35Zmm7293 10:13AM Melanie Dawson Order Number: [id number]   Performing Comments: 3/1/17 BUN 10/creatinine 1 29   - Patient Instructions: To schedule this appointment, please contact Central Scheduling at [phone]  Test Name Result Flag Reference   MRI BRAIN IAC WO AND W CONTRAST (Report)     This is a summary report  The complete report is available in the patient's medical record  If you cannot access the medical record, please contact the sending organization for a detailed fax or copy  MRI BRAIN AND IAC'S - WITH AND WITHOUT CONTRAST     INDICATION: 51-year-old male, headaches, dizziness, blurry vision 2 weeks     COMPARISON: 1/22/2016 MRI     TECHNIQUE:   Brain:  Sagittal T1, axial T2, axial [place], axial T1, axial FLAIR, axial diffusion imaging  Axial T1 postcontrast           IAC'S: Coronal FIESTA, coronal T1 postcontrast, axial T1 postcontrast with fat suppression  Targeted images of the IAC'S were performed requiring additional time at acquisition and interpretation of approximately 25%     IV Contrast: 10 mL of gadobutrol injection (MULTI-DOSE)      IMAGE QUALITY:  Diagnostic  FINDINGS:     BRAIN PARENCHYMA:    Numerous T2 and FLAIR hyperintense foci most consistent with mild-to-moderate chronic microangiopathic ischemic changes are present within the supratentorial white matter, similar to previous  No acute ischemic disease identified     There is no discrete mass, mass effect or midline shift  Brainstem and cerebellum demonstrate normal signal  There is no intracranial hemorrhage  There is no evidence of acute infarction and diffusion imaging is unremarkable  Normal postcontrast    imaging  IAC'S: No CP angle mass or abnormal enhancement  Normal aeration of the mastoid air cells and middle ear cavity       VENTRICLES: Normal      SELLA AND PITUITARY GLAND: Normal      ORBITS: Normal      PARANASAL SINUSES: Mild diffuse paranasal sinus mucosal thickening without air-fluid levels, unchanged     VASCULATURE: Evaluation of the major intracranial vasculature demonstrates appropriate flow voids       CALVARIUM AND SKULL BASE: Normal      EXTRACRANIAL SOFT TISSUES: Normal        IMPRESSION:   Persistent supratentorial white matter T2 and FLAIR hyperintense foci most likely represent mild to moderate chronic microangiopathic ischemic disease, unchanged     No acute ischemic disease     Mild diffuse paranasal sinus disease without air-fluid levels, unchanged     Normal posterior fossa and IACs           Workstation performed: [id number]     Signed by:   Katerina Laughlin MD   4/17/17

## 2018-01-17 NOTE — MISCELLANEOUS
Provider Comments  Provider Comments:   Pt was a no-show for today's 840am apt        Signatures   Electronically signed by : Homer Strauss, ; Jul 17 2017 10:52AM EST                       (Administrative)

## 2018-01-22 ENCOUNTER — ALLSCRIPTS OFFICE VISIT (OUTPATIENT)
Dept: OTHER | Facility: OTHER | Age: 58
End: 2018-01-22

## 2018-01-22 VITALS
SYSTOLIC BLOOD PRESSURE: 168 MMHG | HEART RATE: 64 BPM | DIASTOLIC BLOOD PRESSURE: 98 MMHG | BODY MASS INDEX: 35.79 KG/M2 | RESPIRATION RATE: 16 BRPM | WEIGHT: 236.13 LBS | HEIGHT: 68 IN

## 2018-01-23 DIAGNOSIS — N20.0 CALCULUS OF KIDNEY: ICD-10-CM

## 2018-01-23 DIAGNOSIS — E78.2 MIXED HYPERLIPIDEMIA: ICD-10-CM

## 2018-01-23 DIAGNOSIS — Z12.5 ENCOUNTER FOR SCREENING FOR MALIGNANT NEOPLASM OF PROSTATE: ICD-10-CM

## 2018-01-23 NOTE — PROGRESS NOTES
Assessment   1  Nephrolithiasis (592 0) (N20 0)   2  Peyronie disease (607 85) (N48 6)   3  Prostate cancer screening (V76 44) (Z12 5)    Plan   Nephrolithiasis    · Schedule Surgery Treatment  Procedure    Left ESWL at [facility] with Dr Alisa Shea  Patient requests April 2018  Patient will need office visit March 2018 4 H and P and consent  Status: Hold    For - Scheduling  Requested for: [id number]   Ordered; For: Nephrolithiasis; Ordered By: Fatuma Alvarado Performed:  Due: [id number]  Prostate cancer screening    · (1) PSA (SCREEN) (Dx V76 44 Screen for Prostate Cancer); Status:Active; Requested    for:22Jan2018; Perform:[facility]; MGY:[id number];CALJBTS; For:Prostate cancer screening; Ordered By:Teresa Sutton; Discussion/Summary   Discussion Summary:    My impression is mild peyronies disease, routine prostate cancer screening, left 1 2 cm renal pelvis calculus  recommend obtaining a PSA level at this time  With regards to the peyronies disease, there is no intervention recommended at this time as he does not have pain with erections  Lastly, with respect to the 12 mm left renal pelvis calculus, I recommend left ESWL  The patient is interested in this procedure but wishes to defer this until April 2018  The patient will return in follow-up in March 2018 for preoperative history and physical examination as well as to obtain informed consent  Counseling Documentation With Imm: total time of encounter was 25 minutes-- and-- 20 minutes was spent counseling  Chief Complaint   Chief Complaint Free Text Note Form: patient presents for nephrolithiasis; Peyronie disease      History of Present Illness   HPI: Odette Moreno is a 80-year-old male concerned about an upward angulation of the phallus  He states that over the course of the last year he has not had issues with this problem  He states that he is able to have adequate erections for intercourse without pain or discomfort   He is not interested in pursuing additional intervention or treatment for his Peyronie is disease at this time  His last PSA level from 2015 is 0 5  More recently, however, he had a KUB which shows a 12 mm stone in the left renal pelvis  He is asymptomatic from his left nonobstructing stone  Review of Systems   Complete-Male Urology:      Constitutional: No fever or chills, feels well, no tiredness, no recent weight gain or weight loss  Respiratory: No complaints of shortness of breath, no wheezing, no cough, no SOB on exertion, no orthopnea or PND  Cardiovascular: No complaints of slow heart rate, no fast heart rate, no chest pain, no palpitations, no leg claudication, no lower extremity  Gastrointestinal: No complaints of abdominal pain, no constipation, no nausea or vomiting, no diarrhea or bloody stools  Genitourinary: Empty sensation-- and-- stream quality good, but-- no dysuria,-- no urinary hesitancy,-- no hematuria,-- no incontinence,-- no nocturia-- and-- no feelings of urinary urgency  Musculoskeletal: No complaints of arthralgia, no myalgias, no joint swelling or stiffness, no limb pain or swelling  Integumentary: No complaints of skin rash or skin lesions, no itching, no skin wound, no dry skin  Hematologic/Lymphatic: No complaints of swollen glands, no swollen glands in the neck, does not bleed easily, no easy bruising  Neurological: No compliants of headache, no confusion, no convulsions, no numbness or tingling, no dizziness or fainting, no limb weakness, no difficulty walking  Active Problems   1  Abrasion of right thumb (915 0) (S60 311A)   2  Allergic rhinitis (477 9) (J30 9)   3  Aneurysm of thoracic aorta (441 2) (I71 2)   4  Anxiety disorder (300 00) (F41 9)   5  History of Aortic Aneurysm Repair Ascending Aorta   6  Atherosclerosis of coronary artery bypass graft(s) without angina pectoris (414 05)     (I25 810)   7   Atrial fibrillation (427 31) (I48 91)   8  Atypical chest pain (786 59) (R07 89)   9  Blurry vision (368 8) (H53 8)   10  BRBPR (bright red blood per rectum) (569 3) (K62 5)   11  Chest pain (786 50) (R07 9)   12  Colon cancer screening (V76 51) (Z12 11)   13  Constipation (564 00) (K59 00)   14  Depression (311) (F32 9)   15  Dysconjugate gaze (378 87) (H51 8)   16  Edema of both legs (782 3) (R60 0)   17  ETD (eustachian tube dysfunction) (381 81) (H69 80)   18  Fecal soiling (787 62) (R15 1)   19  GERD (gastroesophageal reflux disease) (530 81) (K21 9)   20  Headache (784 0) (R51)   21  Hernia (553 9) (K46 9)   22  Hypertension (401 9) (I10)   23  Irritable bowel syndrome (564 1) (K58 9)   24  Medication management (V58 69) (Z79 899)   25  Microangiopathy (443 9) (I73 9)   26  Mixed hyperlipidemia (272 2) (E78 2)   27  Murmur (785 2) (R01 1)   28  Need for influenza vaccination (V04 81) (Z23)   29  Neoplasm of skin (239 2) (D49 2)   30  Nephrolithiasis (592 0) (N20 0)   31  Nephrolithiasis (592 0) (N20 0)   32  Numbness on left side (782 0) (R20 0)   33  Obesity, Class II, BMI 35-39 9, with comorbidity (278 00) (E66 9)   34  Palpitations (785 1) (R00 2)   35  Panic attack (300 01) (F41 0)   36  Peyronie disease (607 85) (N48 6)   37  Preop testing (V72 84) (Z01 818)   38  Prostate cancer screening (V76 44) (Z12 5)   39  Rheumatic heart disease (398 90) (I09 9)   40  Rosacea (695 3) (L71 9)   41  S/P ascending aortic replacement (V43 4) (Z95 828)   42  Sciatica (724 3) (M54 30)   43  Screening for depression (V79 0) (Z13 89)   44  Serous otitis media (381 4) (H65 90)   45  Shoulder pain (719 41) (M25 519)   46  TIA (transient ischemic attack) (435 9) (G45 9)   47  Tremor (781 0) (R25 1)   48  Vertigo (780 4) (R42)   49  Vitamin D deficiency (268 9) (E55 9)   50  Weakness of left side of body (728 87) (R53 1)    Past Medical History   1  Aneurysm of thoracic aorta (441 2) (I71 2)   2  History of D-dimer, elevated (790 92) (R79 89)   3   History of cholelithiasis (V12 79) (Z87 19)   4  History of diverticulitis of colon (V12 79) (Z87 19)   5  History of TIA (transient ischemic attack) (V12 54) (Z86 73)   6  History of Urinary tract infection, acute (599 0) (N39 0)    Surgical History   1  History of Aortic Aneurysm Repair Ascending Aorta   2  History of Appendectomy   3  History of Cholecystectomy Laparoscopic   4  History of Cystoscopy With Removal Of Object   5  History of Knee Arthroscopy With Medial Meniscus Repair   6  History of Renal Lithotripsy   7  History of Thoracic Aortic Aneurysmorrhaphy    Family History   Mother    1  Family history of diverticulitis of colon (V18 59) (Z83 79)   2  Family history of Nephrolithiasis  Father    3  Family history of emphysema (V17 6) (Z82 5)  Sibling    4  Family history of diabetes mellitus (V18 0) (Z83 3)   5  Family history of hypertension (V17 49) (Z82 49)   6  Family history of Hernia  Sister    7  Family history of Nephrolithiasis  Paternal Grandmother    6  Family history of breast cancer (V16 3) (Z80 3)  Maternal Grandfather    9  Family history of Myocardial infarct  Paternal Grandfather    8  Family history of lung cancer (V16 1) (Z80 1)  Family History    11  Family history of Cancer   12  Family history of Coronary Artery Disease (V17 49)   13  Family history of Death In The Family Father    Social History    · Denied: Alcohol use   · Caffeine use (V49 89) (F15 90)   · Completed some college   ·    · Denied: Drug use (305 90) (F19 90)   · Never a smoker   · No secondhand smoke exposure (V49 89) (Z78 9)    Current Meds    1  Aspirin 325 MG Oral Tablet; TAKE 1 TABLET DAILY; Therapy: 48BMQ3402 to (Evaluate:06Nov2015); Last Rx:11Nov2014 Ordered   2  Metoprolol Tartrate 50 MG Oral Tablet; Take 1 tablet twice daily; Therapy: 97EQO0505 to ([phone])  Requested for: 29YEN1795; Last     Rx:20Nov2017 Ordered   3   Omeprazole 40 MG Oral Capsule Delayed Release; take 1 capsule daily; Therapy: 12LFD8409 to (RXXSUTIX:[id number])  Requested for: 28WUD6164; Last     Rx:81Jai9851 Ordered   4  Rosuvastatin Calcium 20 MG Oral Tablet; Take one tablet by mouth daily; Therapy: 48ECQ2862 to (Bergoo Sánchez)  Requested for: 80JOE2308; Last     Rx:74Qnb2705 Ordered    Allergies   1  TraMADol HCl ER TB24   2  Bactrim TABS    Vitals   Vital Signs    Recorded: 08NAU2150 10:25AM   Heart Rate 64   Systolic 126   Diastolic 444   Height 5 ft 8 in   Weight 247 lb 8 oz   BMI Calculated 37 63   BSA Calculated 2 24     Physical Exam        Additional Exam:  On examination he is in no acute distress  His abdomen is soft obese nontender nondistended  His  examination reveals a somewhat buried phallus  There is no discrete plaque appreciated in the corpora  Scrotum and scrotal contents are normal  Digital rectal examination reveals a 40 g prostate which is smooth and firm without nodularity  Skin is warm  Extremities without edema  Neurologic is grossly intact and nonfocal  Gait normal  Affect normal       Results/Data   (1) PSA, DIAGNOSTIC (FOLLOW-UP) 44Zhn6276 09:39AM Odell Moritz      Test Name Result Flag Reference   PSA, TOTAL 0 5 ng/mL  < OR = 4 0   This test was performed using the Siemens     chemiluminescent method  Values obtained from     different assay methods cannot be used     interchangeably  PSA levels, regardless of     value, should not be interpreted as absolute     evidence of the presence or absence of disease  Future Appointments      Date/Time Provider Specialty Site   02/22/2018 09:00 AM Odell Moritz, M D   [facility]   01/31/2018 09:00 AM Nicolas Hyman DO Cardiology [address] [place] [address]     Signatures    Electronically signed by : Darrell Sullivan MD; Jan 22 2018 11:16AM EST                       (Author)

## 2018-01-29 RX ORDER — ROSUVASTATIN CALCIUM 20 MG/1
1 TABLET, COATED ORAL DAILY
COMMUNITY
Start: 2017-11-10 | End: 2018-05-09 | Stop reason: SDUPTHER

## 2018-01-29 RX ORDER — OMEPRAZOLE 40 MG/1
1 CAPSULE, DELAYED RELEASE ORAL DAILY
COMMUNITY
Start: 2017-07-25 | End: 2018-02-22

## 2018-01-31 ENCOUNTER — OFFICE VISIT (OUTPATIENT)
Dept: CARDIOLOGY CLINIC | Facility: CLINIC | Age: 58
End: 2018-01-31
Payer: COMMERCIAL

## 2018-01-31 VITALS
HEIGHT: 68 IN | WEIGHT: 243 LBS | DIASTOLIC BLOOD PRESSURE: 86 MMHG | RESPIRATION RATE: 16 BRPM | BODY MASS INDEX: 36.83 KG/M2 | HEART RATE: 64 BPM | SYSTOLIC BLOOD PRESSURE: 144 MMHG

## 2018-01-31 DIAGNOSIS — Z98.890 H/O AORTIC ROOT REPAIR: ICD-10-CM

## 2018-01-31 DIAGNOSIS — R06.00 DOE (DYSPNEA ON EXERTION): ICD-10-CM

## 2018-01-31 DIAGNOSIS — Q23.1 BICUSPID AORTIC VALVE: ICD-10-CM

## 2018-01-31 DIAGNOSIS — I10 BENIGN ESSENTIAL HTN: Primary | ICD-10-CM

## 2018-01-31 DIAGNOSIS — E78.5 DYSLIPIDEMIA: ICD-10-CM

## 2018-01-31 PROCEDURE — 99214 OFFICE O/P EST MOD 30 MIN: CPT | Performed by: INTERNAL MEDICINE

## 2018-01-31 RX ORDER — AMOXICILLIN 500 MG/1
CAPSULE ORAL
Refills: 1 | COMMUNITY
Start: 2018-01-05 | End: 2020-03-27 | Stop reason: HOSPADM

## 2018-01-31 NOTE — PROGRESS NOTES
Cardiology Follow Up        Claudia Winters      1960      [id number]      Discussion/Summary:    1  Hypertension:  Mildly elevated, patient encouraged to check blood pressures at home, and call me if systolic blood pressure remains greater than 140 mm Hg  His opinion is that his blood pressure is well controlled even at this level  2   Dyslipidemia:  Reviewed lipid panel from August 2017, reasonably controlled, continue rosuvastatin  3  Bicuspid aortic valve, without significant stenosis:  Reviewed echocardiogram report from August 2017, normal ejection fraction without aortic valve stenosis  Aortic valve leaflets poorly visualized  Continue surveillance every 2-3 years  4   History of significant ascending aortic aneurysm, status post replacement with matthew arch reconstruction in 2014 (bicuspid valve not replaced at that time):  Blood pressure control should be pursued  5   Paroxysmal atrial fibrillation:  Postoperative in November 2014, transient, without any objective recurrence:  Normal rhythm on physical exam today, check ECG with next visit  6  Palpitations:  Suspect benign by history, Holter monitor reviewed from October 2017 at which time palpitations correlated with sinus rhythm  Patient asked to call if any worsening, in which case repeat monitoring may be considered        Interval History: This is a very pleasant 55-year-old male with a significant history of ascending aortic aneurysm, status post open replacement of the ascending aorta with matthew arch reconstruction on 11/06/2014  Preoperative cardiac catheterization was unremarkable  Postoperatively, he had transient atrial fibrillation, which converted to sinus rhythm on amiodarone/metoprolol, without any recurrence    Therefore he has not been maintained on anticoagulation while seeing Dr Silvana Moya in the past   He also has a history of bicuspid aortic valve without significant stenosis, hypertension  He was last hospitalized August 2017 for numbness on his face, left arm, and left leg, with MRI brain which was unremarkable for stroke, without any evidence of atrial fibrillation on telemetry  MRI cervical spine 09/2017 was unremarkable  Echocardiogram on 8/27/17 revealed a normal ejection fraction at 60% with no evidence of aortic stenosis and no regurgitation  The valve leaflets were not well visualized  Prior Holter monitor October 2017 revealed no evidence of atrial fibrillation, only occasional PACs and PVCs  During his prior visit with me in November 2017, metoprolol was increased to 50 mg twice daily and he was continued on rosuvastatin for dyslipidemia  He was requested to follow up in 1 month, but never did  He presents today for follow up  He continues to complain of occasional palpitations, occurring almost every day, which she describes a sensation of his heart beating normally, often which she feels when lying down in bed  He denies any associated symptoms with it  He has chronic but stable exertional dyspnea, denies exertional chest pain, presyncope or syncope  Vitals:  Vitals:    01/31/18 0920   BP: 144/86   BP Location: Left arm   Patient Position: Sitting   Cuff Size: Standard   Pulse: 64   Resp: 16   Weight: 110 kg (243 lb)   Height: 5' 8" (1 727 m)         Past Medical History:   Diagnosis Date    Cardiac disease     Hypertension     Stroke [facility])      Social History     Social History    Marital status: Single     Spouse name: N/A    Number of children: N/A    Years of education: N/A     Occupational History    Not on file  Social History Main Topics    Smoking status: Never Smoker    Smokeless tobacco: Never Used    Alcohol use No    Drug use: No    Sexual activity: Not on file     Other Topics Concern    Not on file     Social History Narrative    No narrative on file      No family history on file  No past surgical history on file      Current Outpatient Prescriptions:     amoxicillin (AMOXIL) 500 mg capsule, TAKE 4 CAPSULES 1 HOUR PRIOR TO APPOINTMENT, Disp: , Rfl: 1    aspirin 325 mg tablet, Take 325 mg by mouth daily, Disp: , Rfl:     metoprolol tartrate (LOPRESSOR) 50 mg tablet, Take 50 mg by mouth every 12 (twelve) hours  , Disp: , Rfl:     omeprazole (PriLOSEC) 20 mg delayed release capsule, Take 20 mg by mouth daily, Disp: , Rfl:     rosuvastatin (CRESTOR) 20 MG tablet, Take 1 tablet by mouth daily, Disp: , Rfl:     omeprazole (PriLOSEC) 40 MG capsule, Take 1 capsule by mouth daily, Disp: , Rfl:     Labs:  No visits with results within 2 Month(s) from this visit  Latest known visit with results is:   Lab Conversion - Encounter on 10/13/2017   Component Date Value    Lyme IgG/IgM Ab 10/12/2017 <0 90          Review of Systems:  Review of Systems   Respiratory: Negative  Cardiovascular: Positive for palpitations  Neurological: Positive for light-headedness  All other systems reviewed and are negative  Physical Exam:  Physical Exam   Constitutional: He is oriented to person, place, and time  He appears well-developed and well-nourished  No distress  HENT:   Head: Normocephalic and atraumatic  Eyes: EOM are normal  Pupils are equal, round, and reactive to light  Right eye exhibits no discharge  No scleral icterus  Neck: Normal range of motion  Neck supple  No thyromegaly present  Cardiovascular: Normal rate, regular rhythm and normal heart sounds  Exam reveals no gallop and no friction rub  No murmur heard  Pulmonary/Chest: Effort normal and breath sounds normal    Abdominal: He exhibits no distension  There is no tenderness  There is no rebound and no guarding  Musculoskeletal: Normal range of motion  He exhibits no edema  Neurological: He is alert and oriented to person, place, and time  Coordination normal    Skin: Skin is warm and dry  No rash noted  He is not diaphoretic  No erythema  No pallor  Psychiatric: He has a normal mood and affect   His behavior is normal  Judgment and thought content normal

## 2018-01-31 NOTE — PATIENT INSTRUCTIONS
Followup in 6 months  Continue the same medications  Monitor blood pressure at home, call me if systolic blood pressure greater than 140 mm of mercury

## 2018-02-02 LAB
ALBUMIN SERPL-MCNC: 4 G/DL (ref 3.6–5.1)
ALBUMIN/GLOB SERPL: 1.7 (CALC) (ref 1–2.5)
ALP SERPL-CCNC: 121 U/L (ref 40–115)
ALT SERPL-CCNC: 23 U/L (ref 9–46)
AST SERPL-CCNC: 19 U/L (ref 10–35)
BILIRUB SERPL-MCNC: 1.5 MG/DL (ref 0.2–1.2)
BUN SERPL-MCNC: 10 MG/DL (ref 7–25)
BUN/CREAT SERPL: ABNORMAL (CALC) (ref 6–22)
CALCIUM SERPL-MCNC: 9.2 MG/DL (ref 8.6–10.3)
CHLORIDE SERPL-SCNC: 104 MMOL/L (ref 98–110)
CHOLEST SERPL-MCNC: 104 MG/DL
CHOLEST/HDLC SERPL: 3.3 (CALC)
CO2 SERPL-SCNC: 31 MMOL/L (ref 20–31)
CREAT SERPL-MCNC: 1.15 MG/DL (ref 0.7–1.33)
GLOBULIN SER CALC-MCNC: 2.4 G/DL (CALC) (ref 1.9–3.7)
GLUCOSE SERPL-MCNC: 107 MG/DL (ref 65–99)
HDLC SERPL-MCNC: 32 MG/DL
LDLC SERPL CALC-MCNC: 54 MG/DL (CALC)
NONHDLC SERPL-MCNC: 72 MG/DL (CALC)
POTASSIUM SERPL-SCNC: 3.8 MMOL/L (ref 3.5–5.3)
PROT SERPL-MCNC: 6.4 G/DL (ref 6.1–8.1)
SL AMB EGFR AFRICAN AMERICAN: 81 ML/MIN/1.73M2
SL AMB EGFR NON AFRICAN AMERICAN: 70 ML/MIN/1.73M2
SODIUM SERPL-SCNC: 141 MMOL/L (ref 135–146)
TRIGL SERPL-MCNC: 98 MG/DL

## 2018-02-22 ENCOUNTER — OFFICE VISIT (OUTPATIENT)
Dept: FAMILY MEDICINE CLINIC | Facility: CLINIC | Age: 58
End: 2018-02-22
Payer: COMMERCIAL

## 2018-02-22 VITALS
WEIGHT: 248.6 LBS | HEART RATE: 64 BPM | SYSTOLIC BLOOD PRESSURE: 150 MMHG | HEIGHT: 69 IN | BODY MASS INDEX: 36.82 KG/M2 | DIASTOLIC BLOOD PRESSURE: 80 MMHG

## 2018-02-22 DIAGNOSIS — I48.91 ATRIAL FIBRILLATION, UNSPECIFIED TYPE (HCC): ICD-10-CM

## 2018-02-22 DIAGNOSIS — I25.810 ATHEROSCLEROSIS OF CORONARY ARTERY BYPASS GRAFT OF NATIVE HEART WITHOUT ANGINA PECTORIS: Primary | ICD-10-CM

## 2018-02-22 DIAGNOSIS — I10 ESSENTIAL HYPERTENSION: ICD-10-CM

## 2018-02-22 DIAGNOSIS — I48.0 PAROXYSMAL ATRIAL FIBRILLATION (HCC): ICD-10-CM

## 2018-02-22 DIAGNOSIS — N20.0 CALCULUS OF KIDNEY: ICD-10-CM

## 2018-02-22 DIAGNOSIS — F41.1 GENERALIZED ANXIETY DISORDER: ICD-10-CM

## 2018-02-22 DIAGNOSIS — K21.9 GASTROESOPHAGEAL REFLUX DISEASE WITHOUT ESOPHAGITIS: ICD-10-CM

## 2018-02-22 PROBLEM — R15.1 FECAL SOILING: Status: ACTIVE | Noted: 2017-11-10

## 2018-02-22 PROBLEM — I73.9 MICROANGIOPATHY (HCC): Status: ACTIVE | Noted: 2017-04-24

## 2018-02-22 PROBLEM — K62.5 BRBPR (BRIGHT RED BLOOD PER RECTUM): Status: ACTIVE | Noted: 2017-11-10

## 2018-02-22 PROBLEM — E78.2 MIXED HYPERLIPIDEMIA: Status: ACTIVE | Noted: 2017-03-17

## 2018-02-22 PROCEDURE — 93000 ELECTROCARDIOGRAM COMPLETE: CPT | Performed by: FAMILY MEDICINE

## 2018-02-22 PROCEDURE — 99214 OFFICE O/P EST MOD 30 MIN: CPT | Performed by: FAMILY MEDICINE

## 2018-02-22 RX ORDER — PANTOPRAZOLE SODIUM 40 MG/1
40 TABLET, DELAYED RELEASE ORAL DAILY
Qty: 90 TABLET | Refills: 3 | Status: SHIPPED | OUTPATIENT
Start: 2018-02-22 | End: 2019-02-23 | Stop reason: SDUPTHER

## 2018-02-22 RX ORDER — LOSARTAN POTASSIUM 25 MG/1
25 TABLET ORAL DAILY
Qty: 30 TABLET | Refills: 11 | Status: SHIPPED | OUTPATIENT
Start: 2018-02-22 | End: 2018-12-11 | Stop reason: SDUPTHER

## 2018-02-22 NOTE — ASSESSMENT & PLAN NOTE
Patient does have kidney stone  He is following with Urology  They have asked him to consider lithotripsy  I would recommend that he proceed with that if Urology is recommending it, especially if he has a quite large stone (12 mm)

## 2018-02-22 NOTE — PROGRESS NOTES
Assessment/Plan:    GERD (gastroesophageal reflux disease)  Patient reports his reflux is worse recently  Recommend change from omeprazole to Protonix  Re-evaluate after approximately 1 month  Essential hypertension  Blood pressure today is still not at goal, though it is definitely lower than what it was at Cardiology  Patient mentioned that this is much better than what he is normally seen with his blood pressure  Based on the fact that he does have heart disease, as well as issues with this, would strongly recommend that we add Cozaar  I did discuss lisinopril, but came up that he did have problems with that before  Based on that, Cozaar 25 mg  Recheck in 1 month, and re-evaluate  Of note, I did consider increasing metoprolol, but with his low heart rate today I thought that would be inadvisable  Anxiety disorder  Stable at the moment  Minimal symptoms  Question if this is some of the symptoms that he is having with his chest   Re-evaluate in the future  Atherosclerosis of coronary artery bypass graft(s) without angina pectoris  Stable at the moment  He is having some chest pressure, but I do wonder if this is more related to reflux  After the changes in medication from omeprazole to pantoprazole, we will see if that makes any difference with the pressures  If it does, I would check that up to GERD rather than heart disease  Further, he recently did see Cardiology, and was doing quite well at that point  He did mention that he is still SOB/SCHMITT  He is noting this after the surgery  He was in [facility] before  I would recommend PT/OT for work hardening and consider Cardiac rehab   at [facility] is reasonable  Atrial fibrillation [facility])  Patient does have paroxysmal atrial fibrillation  Is seen Cardiology  His EKG today did not reveal atrial fibrillation  Stable at the moment  Continue on aspirin, 325 daily  Calculus of kidney  Patient does have kidney stone    He is following with Urology  They have asked him to consider lithotripsy  I would recommend that he proceed with that if Urology is recommending it, especially if he has a quite large stone (12 mm)  Diagnoses and all orders for this visit:    Atherosclerosis of coronary artery bypass graft of native heart without angina pectoris  -     losartan (COZAAR) 25 mg tablet; Take 1 tablet (25 mg total) by mouth daily  -     Comprehensive metabolic panel; Future  -     Lipid panel; Future    Essential hypertension  -     losartan (COZAAR) 25 mg tablet; Take 1 tablet (25 mg total) by mouth daily    Paroxysmal atrial fibrillation (HCC)  -     Comprehensive metabolic panel; Future    Calculus of kidney    Generalized anxiety disorder  -     Comprehensive metabolic panel; Future    Gastroesophageal reflux disease without esophagitis  -     pantoprazole (PROTONIX) 40 mg tablet; Take 1 tablet (40 mg total) by mouth daily    Atrial fibrillation, unspecified type ([facility] [address] )  -     POCT ECG  -     Comprehensive metabolic panel; Future          Subjective:      Patient ID: Marlyn Adams is a 62 y o  male  CC:  Follow up to chronic health issues including Afib, Hyperlipidemia, Cerebral angiopathy, CAD and Edema  No further episodes of rectal bleeding since last visit  Review labs  Discuss omeprazole --not sure it is helping  Patient is continuing to have reflux symptoms, despite being on the omeprazole  He has been on it for quite some time, and continues to have symptoms  He wondered if there was a problem with taking it for longer period of time  The he does continue to have some chest pain at times, along with feeling like he is going to faint  He is not sure what's going on with this  He mentioned that instead of pain it is more a fullness, chest discomfort, congestion, rather than true pain  Is also a pressure    He did mention that he also frequently does have palpitations, i e  a thumping in his chest   Approximately 6 months ago he reports he had a monitor, but nothing showed up  No particular concerns with hypertension  No particular concerns with hyperlipidemia at the moment  Renal calculus:  He is following with Urology  Reports that it is now 12 mm  Urology is requesting that he do lithotripsy  He did mention he has a lesion on the right side of the head  It is just inside the hairline off of the forehead  He wondered what it was as he can't see it well  He did mention that he has multiple lesions on his back that he is brought to the attention of Dermatology before  They felt they were benign, and that no treatment needed to be done at that time  He wondered if there was anything else to do with them  Labs reviewed  HDL low    Alk Phos 121  EKG: Sinus joanne (60bpm)    The following portions of the patient's history were reviewed and updated as appropriate: allergies, current medications, past family history, past medical history, past social history, past surgical history and problem list     Review of Systems   Constitutional: Negative  HENT: Negative  Eyes: Negative  Respiratory: Negative  Cardiovascular: Negative  Gastrointestinal: Negative  Endocrine: Negative  Genitourinary: Negative  Musculoskeletal: Negative  Skin:        Per HPI   Allergic/Immunologic: Negative  Neurological: Negative  Hematological: Negative  Psychiatric/Behavioral: Negative  Objective:  Vitals:    02/22/18 0855   BP: 150/80   BP Location: Left arm   Patient Position: Sitting   Cuff Size: Large   Pulse: 64   Weight: 113 kg (248 lb 9 6 oz)   Height: 5' 8 5" (1 74 m)       /80 (BP Location: Left arm, Patient Position: Sitting, Cuff Size: Large)   Pulse 64   Ht 5' 8 5" (1 74 m)   Wt 113 kg (248 lb 9 6 oz)   BMI 37 25 kg/m²          Physical Exam   Constitutional: He appears well-developed and well-nourished  HENT:   Head: Normocephalic and atraumatic     Neck: Normal range of motion  Neck supple  Cardiovascular: Normal rate, regular rhythm and normal heart sounds  Exam reveals no gallop and no friction rub  No murmur heard  Pulses:       Carotid pulses are 2+ on the right side, and 2+ on the left side  Pulmonary/Chest: Effort normal and breath sounds normal  No respiratory distress  He has no wheezes  He has no rales  Skin:   Seborrheic keratosis just inside the hairline on the right side near the temple  It is approximately 1 cm in diameter, uniform color, tan to brown  Not raised  Mobile, nontender  Nursing note and vitals reviewed

## 2018-02-22 NOTE — ASSESSMENT & PLAN NOTE
Stable at the moment  He is having some chest pressure, but I do wonder if this is more related to reflux  After the changes in medication from omeprazole to pantoprazole, we will see if that makes any difference with the pressures  If it does, I would check that up to GERD rather than heart disease  Further, he recently did see Cardiology, and was doing quite well at that point  He did mention that he is still SOB/SCHMITT  He is noting this after the surgery  He was in [facility] before  I would recommend PT/OT for work hardening and consider Cardiac rehab   at [facility] is reasonable

## 2018-02-22 NOTE — ASSESSMENT & PLAN NOTE
Blood pressure today is still not at goal, though it is definitely lower than what it was at Cardiology  Patient mentioned that this is much better than what he is normally seen with his blood pressure  Based on the fact that he does have heart disease, as well as issues with this, would strongly recommend that we add Cozaar  I did discuss lisinopril, but came up that he did have problems with that before  Based on that, Cozaar 25 mg  Recheck in 1 month, and re-evaluate  Of note, I did consider increasing metoprolol, but with his low heart rate today I thought that would be inadvisable

## 2018-02-22 NOTE — ASSESSMENT & PLAN NOTE
Stable at the moment  Minimal symptoms  Question if this is some of the symptoms that he is having with his chest   Re-evaluate in the future

## 2018-02-22 NOTE — ASSESSMENT & PLAN NOTE
Patient does have paroxysmal atrial fibrillation  Is seen Cardiology  His EKG today did not reveal atrial fibrillation  Stable at the moment  Continue on aspirin, 325 daily

## 2018-02-22 NOTE — ASSESSMENT & PLAN NOTE
Patient reports his reflux is worse recently  Recommend change from omeprazole to Protonix  Re-evaluate after approximately 1 month

## 2018-02-22 NOTE — PATIENT INSTRUCTIONS
Problem List Items Addressed This Visit     Essential hypertension     Blood pressure today is still not at goal, though it is definitely lower than what it was at Cardiology  Patient mentioned that this is much better than what he is normally seen with his blood pressure  Based on the fact that he does have heart disease, as well as issues with this, would strongly recommend that we add Cozaar  I did discuss lisinopril, but came up that he did have problems with that before  Based on that, Cozaar 25 mg  Recheck in 1 month, and re-evaluate  Of note, I did consider increasing metoprolol, but with his low heart rate today I thought that would be inadvisable  Relevant Medications    losartan (COZAAR) 25 mg tablet    GERD (gastroesophageal reflux disease)     Patient reports his reflux is worse recently  Recommend change from omeprazole to Protonix  Re-evaluate after approximately 1 month  Relevant Medications    pantoprazole (PROTONIX) 40 mg tablet    Anxiety disorder     Stable at the moment  Minimal symptoms  Question if this is some of the symptoms that he is having with his chest   Re-evaluate in the future  Relevant Orders    Comprehensive metabolic panel    Atherosclerosis of coronary artery bypass graft(s) without angina pectoris - Primary     Stable at the moment  He is having some chest pressure, but I do wonder if this is more related to reflux  After the changes in medication from omeprazole to pantoprazole, we will see if that makes any difference with the pressures  If it does, I would check that up to GERD rather than heart disease  Further, he recently did see Cardiology, and was doing quite well at that point  He did mention that he is still SOB/SCHMITT  He is noting this after the surgery  He was in [facility] before  I would recommend PT/OT for work hardening and consider Cardiac rehab   at [facility] is reasonable           Relevant Medications    losartan (COZAAR) 25 mg tablet    Other Relevant Orders    Comprehensive metabolic panel    Lipid panel    Atrial fibrillation [facility])     Patient does have paroxysmal atrial fibrillation  Is seen Cardiology  His EKG today did not reveal atrial fibrillation  Stable at the moment  Continue on aspirin, 325 daily  Relevant Orders    Comprehensive metabolic panel    POCT ECG (Completed)    Comprehensive metabolic panel    Calculus of kidney     Patient does have kidney stone  He is following with Urology  They have asked him to consider lithotripsy  I would recommend that he proceed with that if Urology is recommending it, especially if he has a quite large stone (12 mm)

## 2018-03-22 PROBLEM — N20.0 KIDNEY STONES: Status: ACTIVE | Noted: 2018-03-22

## 2018-03-23 ENCOUNTER — OFFICE VISIT (OUTPATIENT)
Dept: FAMILY MEDICINE CLINIC | Facility: CLINIC | Age: 58
End: 2018-03-23
Payer: COMMERCIAL

## 2018-03-23 VITALS
HEIGHT: 68 IN | RESPIRATION RATE: 20 BRPM | DIASTOLIC BLOOD PRESSURE: 84 MMHG | BODY MASS INDEX: 37.13 KG/M2 | HEART RATE: 72 BPM | SYSTOLIC BLOOD PRESSURE: 130 MMHG | WEIGHT: 245 LBS

## 2018-03-23 DIAGNOSIS — L20.84 INTRINSIC ECZEMA: ICD-10-CM

## 2018-03-23 DIAGNOSIS — I25.810 ATHEROSCLEROSIS OF CORONARY ARTERY BYPASS GRAFT OF NATIVE HEART WITHOUT ANGINA PECTORIS: ICD-10-CM

## 2018-03-23 DIAGNOSIS — I10 ESSENTIAL HYPERTENSION: ICD-10-CM

## 2018-03-23 DIAGNOSIS — K21.9 GASTROESOPHAGEAL REFLUX DISEASE WITHOUT ESOPHAGITIS: Primary | ICD-10-CM

## 2018-03-23 PROCEDURE — 3079F DIAST BP 80-89 MM HG: CPT | Performed by: FAMILY MEDICINE

## 2018-03-23 PROCEDURE — 3075F SYST BP GE 130 - 139MM HG: CPT | Performed by: FAMILY MEDICINE

## 2018-03-23 PROCEDURE — 99214 OFFICE O/P EST MOD 30 MIN: CPT | Performed by: FAMILY MEDICINE

## 2018-03-23 RX ORDER — MOMETASONE FUROATE 1 MG/G
CREAM TOPICAL DAILY
Qty: 45 G | Refills: 0 | Status: ON HOLD | OUTPATIENT
Start: 2018-03-23 | End: 2018-05-17 | Stop reason: ALTCHOICE

## 2018-03-23 RX ORDER — RANITIDINE 150 MG/1
150 CAPSULE ORAL 2 TIMES DAILY
Qty: 60 CAPSULE | Refills: 2 | Status: SHIPPED | OUTPATIENT
Start: 2018-03-23 | End: 2018-04-17

## 2018-03-23 NOTE — PATIENT INSTRUCTIONS
Problem List Items Addressed This Visit     Essential hypertension     Stable  Blood pressure doing well  GERD (gastroesophageal reflux disease) - Primary     Patient has not had any improvement with the pantoprazole  At this point, add Zantac, 150 mg twice a day  I would also recommend consider gastroenterology evaluation  PPIs should be improving things at this point  Relevant Medications    ranitidine (ZANTAC) 150 MG capsule    Other Relevant Orders    Ambulatory referral to Gastroenterology    Atherosclerosis of coronary artery bypass graft(s) without angina pectoris     Stable  No current symptoms  Intrinsic eczema     New  Recommend Elocon cream  Follow in 3 weeks as needed           Relevant Medications    mometasone (ELOCON) 0 1 % cream

## 2018-03-23 NOTE — PROGRESS NOTES
Assessment/Plan:    Intrinsic eczema  New  Recommend Elocon cream  Follow in 3 weeks as needed  GERD (gastroesophageal reflux disease)  Patient has not had any improvement with the pantoprazole  At this point, add Zantac, 150 mg twice a day  I would also recommend consider gastroenterology evaluation  PPIs should be improving things at this point  Essential hypertension  Stable  Blood pressure doing well  Atherosclerosis of coronary artery bypass graft(s) without angina pectoris  Stable  No current symptoms  Diagnoses and all orders for this visit:    Gastroesophageal reflux disease without esophagitis    Essential hypertension    Atherosclerosis of coronary artery bypass graft of native heart without angina pectoris    Intrinsic eczema          Subjective:     Cc: Pt is here a follow up on stomach issues  JONATHAN Zhou      Patient ID: Keaton Rodriguez is a 62 y o  male  Patient has been having some increasing pain on the right side of the chest   He reports that is related to symptoms similar to GERD from before  Nothing has made a difference with it  The pain does radiate, but starts in the center  He did switch from omeprazole to Protonix, but it has not made a difference  He does continue to have belching as well  He has been much more active recently with jobs  His visit seems to be expanding some as well  He was somewhat concerned about statins  We did review side effects  He did mention that he does have memory loss and muscle aches  Memory is minimal, but the muscle aches are fairly significant at times  Currently, he is on medication for his blood pressure, and seems to be doing quite well  Denies any problems  Over the winter, he noticed that he had some increases in irritation on the hands  There is some scaling, areas that do not seem to heal, itchiness  He has used lotion over-the-counter, it did not make a difference    He also has some skin lesions on the temples, but also mention that they have been present in multiple other spots on his body  They have been relatively new  He was unsure what they were  The following portions of the patient's history were reviewed and updated as appropriate: allergies, current medications, past family history, past medical history, past social history, past surgical history and problem list     Review of Systems      Objective:      Vitals:    03/23/18 0859   BP: 130/84   BP Location: Left arm   Patient Position: Sitting   Cuff Size: Standard   Pulse: 72   Resp: 20   Weight: 111 kg (245 lb)   Height: 5' 8" (1 727 m)          Physical Exam   Constitutional: He appears well-developed and well-nourished  HENT:   Head: Normocephalic and atraumatic  Neck: Normal range of motion  Neck supple  Cardiovascular: Normal rate, regular rhythm and normal heart sounds  Exam reveals no gallop and no friction rub  No murmur heard  Pulses:       Carotid pulses are 2+ on the right side, and 2+ on the left side  Pulmonary/Chest: Effort normal and breath sounds normal  No respiratory distress  He has no wheezes  He has no rales  Lymphadenopathy:     He has no cervical adenopathy  Skin:        Nursing note and vitals reviewed

## 2018-03-23 NOTE — ASSESSMENT & PLAN NOTE
Patient has not had any improvement with the pantoprazole  At this point, add Zantac, 150 mg twice a day  I would also recommend consider gastroenterology evaluation  PPIs should be improving things at this point

## 2018-04-09 RX ORDER — RANITIDINE 150 MG/1
TABLET ORAL
Refills: 2 | COMMUNITY
Start: 2018-03-23 | End: 2018-04-17

## 2018-04-17 NOTE — PRE-PROCEDURE INSTRUCTIONS
Pre-Surgery Instructions:   Medication Instructions    aspirin 325 mg tablet Patient was instructed by Physician and understands   losartan (COZAAR) 25 mg tablet Instructed patient per Anesthesia Guidelines   metoprolol tartrate (LOPRESSOR) 50 mg tablet Instructed patient per Anesthesia Guidelines   mometasone (ELOCON) 0 1 % cream Instructed patient per Anesthesia Guidelines   pantoprazole (PROTONIX) 40 mg tablet Instructed patient per Anesthesia Guidelines   rosuvastatin (CRESTOR) 20 MG tablet Instructed patient per Anesthesia Guidelines  Pre op and bathing instructions reviewed

## 2018-04-18 ENCOUNTER — OFFICE VISIT (OUTPATIENT)
Dept: CARDIOLOGY CLINIC | Facility: CLINIC | Age: 58
End: 2018-04-18
Payer: COMMERCIAL

## 2018-04-18 VITALS
WEIGHT: 246 LBS | RESPIRATION RATE: 16 BRPM | HEIGHT: 68 IN | DIASTOLIC BLOOD PRESSURE: 88 MMHG | HEART RATE: 64 BPM | SYSTOLIC BLOOD PRESSURE: 124 MMHG | BODY MASS INDEX: 37.28 KG/M2

## 2018-04-18 DIAGNOSIS — Z01.810 PREOP CARDIOVASCULAR EXAM: Primary | ICD-10-CM

## 2018-04-18 DIAGNOSIS — N20.0 KIDNEY STONES: ICD-10-CM

## 2018-04-18 PROCEDURE — 93000 ELECTROCARDIOGRAM COMPLETE: CPT | Performed by: INTERNAL MEDICINE

## 2018-04-18 PROCEDURE — 99214 OFFICE O/P EST MOD 30 MIN: CPT | Performed by: INTERNAL MEDICINE

## 2018-04-18 NOTE — PROGRESS NOTES
Cardiology Follow Up        Marina Walter      1960      [id number]      Discussion/Summary:    1  Hypertension:  Improved after losartan appropriately started by PCP  Continue the same along with metoprolol  2   Dyslipidemia:  Reviewed lipid panel from August 2017, reasonably controlled, continue rosuvastatin  3  Bicuspid aortic valve, without significant stenosis:  Reviewed echocardiogram report from August 2017, normal ejection fraction without aortic valve stenosis  Aortic valve leaflets poorly visualized  Continue surveillance every 2-3 years, will likely repeat echocardiogram in 1 more year  4   History of significant ascending aortic aneurysm, status post replacement with matthew arch reconstruction in 2014 (bicuspid valve not replaced at that time):  Blood pressure control should be pursued  5   Paroxysmal atrial fibrillation:  Postoperative in November 2014, transient, without any objective recurrence:  Normal rhythm on physical exam today, ECG today revealing sinus rhythm  6   Palpitations:  Suspect benign by history, Holter monitor reviewed from October 2017 at which time palpitations correlated with sinus rhythm  New complaint on today's visit  7   Chronic/stable exertional dyspnea:  Suspect multifactorial, possibly due to weight and deconditioning  No other signs or symptoms of congestive heart failure  Low suspicion of angina  Encouraged active lifestyle, heart healthy diet, weight loss  Patient to report any worsening if recurs  8   Preoperative risk stratification:  Patient for lithotripsy with Urology in the near future  Suspect low cardiac risk  Okay to hold aspirin if needed, but would restart soon as able  Follow-up in 6 months      Interval History:      This is a very pleasant 49-year-old male with a significant history of ascending aortic aneurysm, status post open replacement of the ascending aorta with matthew arch reconstruction on 11/06/2014  Preoperative cardiac catheterization was unremarkable  Postoperatively, he had transient atrial fibrillation, which converted to sinus rhythm on amiodarone/metoprolol, without any recurrence  Therefore he has not been maintained on anticoagulation while seeing Dr Roya Molina in the past   He also has a history of bicuspid aortic valve without significant stenosis, hypertension  He was last hospitalized August 2017 for numbness on his face, left arm, and left leg, with MRI brain which was unremarkable for stroke, without any evidence of atrial fibrillation on telemetry  MRI cervical spine 09/2017 was unremarkable  Echocardiogram on 8/27/17 revealed a normal ejection fraction at 60% with no evidence of aortic stenosis and no regurgitation  The valve leaflets were not well visualized  Prior Holter monitor October 2017 revealed no evidence of atrial fibrillation, only occasional PACs and PVCs  During his prior visit with me in November 2017, metoprolol was increased to 50 mg twice daily and he was continued on rosuvastatin for dyslipidemia  He was requested to follow up in 1 month, but never did  He presents today for follow up  He admits to chronic but stable exertional dyspnea  He states he has been unable to lose weight  He has some chest discomfort mainly when he lays down in bed at night, and has been started Protonix by his PCP which he is not started  Denies exertional chest discomfort        Vitals:  Vitals:    04/18/18 1044   BP: 124/88   BP Location: Left arm   Patient Position: Sitting   Cuff Size: Standard   Pulse: 64   Resp: 16   Weight: 112 kg (246 lb)   Height: 5' 8" (1 727 m)         Past Medical History:   Diagnosis Date    Aneurysm of thoracic aorta ([address] )     last assessed 11/20/17    Anxiety     currently on no meds    Cardiac disease     Cholelithiasis     Chronic kidney disease     GERD (gastroesophageal reflux disease)     Headache due to anesthesia     Hyperlipidemia     Hypertension     Irritable bowel syndrome     Kidney stone     Palpitations     PONV (postoperative nausea and vomiting)     Shortness of breath     ? related to acid reflux    Sleep apnea     not sure    Stroke [facility]) 11/2014    TIA (transient ischemic attack)      Social History     Social History    Marital status: Single     Spouse name: N/A    Number of children: N/A    Years of education: N/A     Occupational History    Not on file       Social History Main Topics    Smoking status: Never Smoker    Smokeless tobacco: Never Used      Comment: no second hand smoke exposure    Alcohol use No    Drug use: No    Sexual activity: Not on file     Other Topics Concern    Not on file     Social History Narrative    Caffeine use    Completed some college     as per [employer]          Family History   Problem Relation Age of Onset    Diverticulitis Mother      of colon    Nephrolithiasis Mother     Emphysema Father     Nephrolithiasis Sister     Heart attack Maternal Grandfather 72    Breast cancer Paternal Grandmother     Lung cancer Paternal Grandfather     Cancer Family     Coronary artery disease Family     Diabetes Family      sibling    Hypertension Family      sibling    Hernia Family      sibling     Past Surgical History:   Procedure Laterality Date    AORTA SURGERY      thoracic - aneurysmorrhaphy    APPENDECTOMY      ASCENDING AORTIC ANEURYSM REPAIR      resolved 8/19/15    CHOLECYSTECTOMY      laparoscopic    CYSTOSCOPY      with removal of object- stent removal    KNEE ARTHROSCOPY Right 1996    with medial meniscus repair    LITHOTRIPSY      renal    THUMB ARTHROSCOPY Right 1976    ligament repair       Current Outpatient Prescriptions:     amoxicillin (AMOXIL) 500 mg capsule, TAKE 4 CAPSULES 1 HOUR PRIOR TO APPOINTMENT, Disp: , Rfl: 1    aspirin 325 mg tablet, Take 325 mg by mouth daily, Disp: , Rfl:     losartan (COZAAR) 25 mg tablet, Take 1 tablet (25 mg total) by mouth daily, Disp: 30 tablet, Rfl: 11    metoprolol tartrate (LOPRESSOR) 50 mg tablet, Take 50 mg by mouth every 12 (twelve) hours  , Disp: , Rfl:     mometasone (ELOCON) 0 1 % cream, Apply topically daily, Disp: 45 g, Rfl: 0    pantoprazole (PROTONIX) 40 mg tablet, Take 1 tablet (40 mg total) by mouth daily, Disp: 90 tablet, Rfl: 3    rosuvastatin (CRESTOR) 20 MG tablet, Take 1 tablet by mouth daily, Disp: , Rfl:     Labs:  No visits with results within 2 Month(s) from this visit  Latest known visit with results is:   Orders Only on 02/02/2018   Component Date Value    Total Cholesterol 02/02/2018 104     SL AMB HDL CHOLESTEROL 02/02/2018 32*    Triglycerides 02/02/2018 98     SL AMB LDL-CHOLESTEROL 02/02/2018 54     SL AMB CHOL/HDLC RATIO 02/02/2018 3 3     SL AMB NON HDL CHOLESTER* 02/02/2018 72     SL AMB GLUCOSE 02/02/2018 107*    BUN 02/02/2018 10     Creatinine, Serum 02/02/2018 1 15     eGFR Non  02/02/2018 70     SL AMB EGFR  AMER* 02/02/2018 81     SL AMB BUN/CREATININE RA* 01/72/6214 NOT APPLICABLE     SL AMB SODIUM 02/02/2018 141     SL AMB POTASSIUM 02/02/2018 3 8     SL AMB CHLORIDE 02/02/2018 104     SL AMB CARBON DIOXIDE 02/02/2018 31     SL AMB CALCIUM 02/02/2018 9 2     SL AMB PROTEIN, TOTAL 02/02/2018 6 4     Serum Albumin 02/02/2018 4 0     SL AMB GLOBULIN 02/02/2018 2 4     SL AMB ALBUMIN/GLOBULIN * 02/02/2018 1 7     SL AMB BILIRUBIN, TOTAL 02/02/2018 1 5*    SL AMB ALKALINE PHOSPHAT* 02/02/2018 121*    SL AMB AST 02/02/2018 19     SL AMB ALT 02/02/2018 23          Review of Systems:  Review of Systems   Respiratory: Positive for shortness of breath  Cardiovascular: Negative for palpitations  Neurological: Negative for light-headedness  All other systems reviewed and are negative  Physical Exam:  Physical Exam   Constitutional: He is oriented to person, place, and time   He appears well-developed and well-nourished  No distress  HENT:   Head: Normocephalic and atraumatic  Eyes: EOM are normal  Pupils are equal, round, and reactive to light  Right eye exhibits no discharge  No scleral icterus  Neck: Normal range of motion  Neck supple  No thyromegaly present  Cardiovascular: Normal rate, regular rhythm and normal heart sounds  Exam reveals no gallop and no friction rub  No murmur heard  Pulmonary/Chest: Effort normal and breath sounds normal    Abdominal: He exhibits no distension  There is no tenderness  There is no rebound and no guarding  Musculoskeletal: Normal range of motion  He exhibits no edema  Neurological: He is alert and oriented to person, place, and time  Coordination normal    Skin: Skin is warm and dry  No rash noted  He is not diaphoretic  No erythema  No pallor  Psychiatric: He has a normal mood and affect   His behavior is normal  Judgment and thought content normal

## 2018-04-26 ENCOUNTER — OFFICE VISIT (OUTPATIENT)
Dept: GASTROENTEROLOGY | Facility: MEDICAL CENTER | Age: 58
End: 2018-04-26
Payer: COMMERCIAL

## 2018-04-26 VITALS
BODY MASS INDEX: 37.28 KG/M2 | HEIGHT: 68 IN | TEMPERATURE: 97.8 F | DIASTOLIC BLOOD PRESSURE: 90 MMHG | HEART RATE: 68 BPM | WEIGHT: 246 LBS | SYSTOLIC BLOOD PRESSURE: 146 MMHG

## 2018-04-26 DIAGNOSIS — E66.3 OVERWEIGHT: ICD-10-CM

## 2018-04-26 DIAGNOSIS — K52.9 COLITIS: ICD-10-CM

## 2018-04-26 DIAGNOSIS — K21.9 GASTROESOPHAGEAL REFLUX DISEASE WITHOUT ESOPHAGITIS: Primary | ICD-10-CM

## 2018-04-26 DIAGNOSIS — K58.9 IRRITABLE BOWEL SYNDROME, UNSPECIFIED TYPE: ICD-10-CM

## 2018-04-26 DIAGNOSIS — Z12.11 SCREENING FOR COLON CANCER: ICD-10-CM

## 2018-04-26 DIAGNOSIS — K62.5 BRBPR (BRIGHT RED BLOOD PER RECTUM): ICD-10-CM

## 2018-04-26 PROCEDURE — 99204 OFFICE O/P NEW MOD 45 MIN: CPT | Performed by: INTERNAL MEDICINE

## 2018-04-26 NOTE — PROGRESS NOTES
[facility]  Vinny Mi 62 y o  male MRN: [id number]  Encounter: [id number]          ASSESSMENT AND PLAN:      1  Gastroesophageal reflux disease without esophagitis-due to history of longstanding reflux EGD planned to rule out hiatal hernia and Orta's esophagus especially in setting of atypical chest  - Case request operating room: EGD AND COLONOSCOPY; Standing  - Case request operating room: EGD AND COLONOSCOPY    2  Irritable bowel syndrome, unspecified type-symptoms are stable at this time denies any active symptoms of IBS  3  Colitis-previous history of colitis x2 which was treated which were course antibiotics  Unlikely for this to be UC or Crohn's may be secondary to infectious colitis? Colonoscopy planned with biopsies and TI intubation for further evaluation  - Na Sulfate-K Sulfate-Mg Sulf (SUPREP BOWEL PREP KIT) 17 5-3 13-1 6 GM/180ML SOLN; Take 177 mL by mouth once for 1 dose  Dispense: 2 Bottle; Refill: 0  - Case request operating room: EGD AND COLONOSCOPY; Standing  - Case request operating room: EGD AND COLONOSCOPY    4  Overweight-he is interested in weight loss will refer to nutritional evaluation for further discussion regarding this  - Ambulatory referral to Nutrition Services; Future    5  Screening for colon cancer-will plan for colonoscopy evaluation for screening for colonic polyps  - Na Sulfate-K Sulfate-Mg Sulf (SUPREP BOWEL PREP KIT) 17 5-3 13-1 6 GM/180ML SOLN; Take 177 mL by mouth once for 1 dose  Dispense: 2 Bottle; Refill: 0  - Case request operating room: EGD AND COLONOSCOPY; Standing  - Case request operating room: EGD AND COLONOSCOPY    ______________________________________________________________________    HPI:       He is a 59-year-old male presents here for evaluation of previous history of colitis, symptoms of IBS and reflux      He was previously diagnosed with colitis of unclear etiology treated with steroids with resolution of symptoms  He also has symptoms of IBS associated diarrhea  Currently denies any symptoms of either IBS or colitis  Lastly he also has intermittent rectal bleeding associated with straining  He does have reflux symptoms several times per week which are night worse associated with with atypical chest pain  The symptoms havei improved with weight loss    REVIEW OF SYSTEMS:    CONSTITUTIONAL: Denies any fever, chills, rigors, and weight loss  HEENT: No earache or tinnitus  Denies hearing loss or visual disturbances  CARDIOVASCULAR: No chest pain or palpitations  RESPIRATORY: Denies any cough, hemoptysis, shortness of breath or dyspnea on exertion  GASTROINTESTINAL: As noted in the History of Present Illness  GENITOURINARY: No problems with urination  Denies any hematuria or dysuria  NEUROLOGIC: No dizziness or vertigo, denies headaches  MUSCULOSKELETAL: Denies any muscle or joint pain  SKIN: Denies skin rashes or itching  ENDOCRINE: Denies excessive thirst  Denies intolerance to heat or cold  PSYCHOSOCIAL: Denies depression or anxiety  Denies any recent memory loss         Historical Information   Past Medical History:   Diagnosis Date    Aneurysm of thoracic aorta ([facility] )     last assessed 11/20/17    Anxiety     currently on no meds    Cardiac disease     Cholelithiasis     Chronic kidney disease     GERD (gastroesophageal reflux disease)     Headache due to anesthesia     Hyperlipidemia     Hypertension     Irritable bowel syndrome     Kidney stone     Palpitations     PONV (postoperative nausea and vomiting)     Shortness of breath     ? related to acid reflux    Sleep apnea     not sure    Stroke ([facility] ) 11/2014    TIA (transient ischemic attack)      Past Surgical History:   Procedure Laterality Date    AORTA SURGERY      thoracic - aneurysmorrhaphy    APPENDECTOMY      ASCENDING AORTIC ANEURYSM REPAIR      resolved 8/19/15    CHOLECYSTECTOMY laparoscopic    CYSTOSCOPY      with removal of object- stent removal    KNEE ARTHROSCOPY Right 1996    with medial meniscus repair    LITHOTRIPSY      renal    THUMB ARTHROSCOPY Right 1976    ligament repair     Social History   History   Alcohol Use No     History   Drug Use No     History   Smoking Status    Never Smoker   Smokeless Tobacco    Never Used     Comment: no second hand smoke exposure     Family History   Problem Relation Age of Onset    Diverticulitis Mother      of colon    Nephrolithiasis Mother     Emphysema Father     Nephrolithiasis Sister     Heart attack Maternal Grandfather 72    Breast cancer Paternal Grandmother     Lung cancer Paternal Grandfather     Cancer Family     Coronary artery disease Family     Diabetes Family      sibling    Hypertension Family      sibling    Hernia Family      sibling       Meds/Allergies       Current Outpatient Prescriptions:     amoxicillin (AMOXIL) 500 mg capsule    aspirin 325 mg tablet    losartan (COZAAR) 25 mg tablet    metoprolol tartrate (LOPRESSOR) 50 mg tablet    mometasone (ELOCON) 0 1 % cream    pantoprazole (PROTONIX) 40 mg tablet    rosuvastatin (CRESTOR) 20 MG tablet    Na Sulfate-K Sulfate-Mg Sulf (SUPREP BOWEL PREP KIT) 17 5-3 13-1 6 GM/180ML SOLN    Allergies   Allergen Reactions    Bactrim [Sulfamethoxazole-Trimethoprim]     Lisinopril      Felt bad, was OK with Zestril brand name   Tramadol            Objective     Blood pressure 146/90, pulse 68, temperature 97 8 °F (36 6 °C), temperature source Tympanic, height 5' 8" (1 727 m), weight 112 kg (246 lb)  Body mass index is 37 4 kg/m²          PHYSICAL EXAM:      General Appearance:   Alert, cooperative, no distress   HEENT:   Normocephalic, atraumatic, anicteric      Neck:  Supple, symmetrical, trachea midline   Lungs:   Clear to auscultation bilaterally; no rales, rhonchi or wheezing; respirations unlabored    Heart[de-identified]   Regular rate and rhythm; no murmur, rub, or gallop  Abdomen:   Soft, non-tender, non-distended; normal bowel sounds; no masses, no organomegaly    Genitalia:   Deferred    Rectal:   Deferred    Extremities:  No cyanosis, clubbing or edema    Pulses:  2+ and symmetric    Skin:  No jaundice, rashes, or lesions    Lymph nodes:  No palpable cervical lymphadenopathy        Lab Results:   No visits with results within 1 Day(s) from this visit  Latest known visit with results is:   Orders Only on 02/02/2018   Component Date Value    Total Cholesterol 02/02/2018 104     SL AMB HDL CHOLESTEROL 02/02/2018 32*    Triglycerides 02/02/2018 98     SL AMB LDL-CHOLESTEROL 02/02/2018 54     SL AMB CHOL/HDLC RATIO 02/02/2018 3 3     SL AMB NON HDL CHOLESTER* 02/02/2018 72     SL AMB GLUCOSE 02/02/2018 107*    BUN 02/02/2018 10     Creatinine, Serum 02/02/2018 1 15     eGFR Non  02/02/2018 70     SL AMB EGFR  AMER* 02/02/2018 81     SL AMB BUN/CREATININE RA* 07/57/4031 NOT APPLICABLE     SL AMB SODIUM 02/02/2018 141     SL AMB POTASSIUM 02/02/2018 3 8     SL AMB CHLORIDE 02/02/2018 104     SL AMB CARBON DIOXIDE 02/02/2018 31     SL AMB CALCIUM 02/02/2018 9 2     SL AMB PROTEIN, TOTAL 02/02/2018 6 4     Serum Albumin 02/02/2018 4 0     SL AMB GLOBULIN 02/02/2018 2 4     SL AMB ALBUMIN/GLOBULIN * 02/02/2018 1 7     SL AMB BILIRUBIN, TOTAL 02/02/2018 1 5*    SL AMB ALKALINE PHOSPHAT* 02/02/2018 121*    SL AMB AST 02/02/2018 19     SL AMB ALT 02/02/2018 23          Radiology Results:   No results found

## 2018-04-26 NOTE — LETTER
May 2, 2018     Justino Avila MD  [place] [address]    Patient: Rell Salgado   YOB: 1960   Date of Visit: 4/26/2018       Dear Dr Carl Boone: Thank you for referring Shanell Ferrara to me for evaluation  Below are my notes for this consultation  If you have questions, please do not hesitate to call me  I look forward to following your patient along with you  Sincerely,        Yimi Steel MD        CC: No Recipients  Yimi Steel MD  4/26/2018  4:27 PM  Sign at close encounter  Reflux - several times per week at night worse  Associated with atypical chest pain   Improved with weight loss  Hx of colitis- needs further currently in remission (had serial steroid use)  Crohn's disease   IBS- resolved

## 2018-04-26 NOTE — PATIENT INSTRUCTIONS
Pt will call to sched colon with dr Nlida Lorenzo he must be done at a hospital setting   m/a gave pt instructions to suprep and procedure he is aware he will get a call the day before with time

## 2018-05-01 ENCOUNTER — APPOINTMENT (OUTPATIENT)
Dept: LAB | Facility: HOSPITAL | Age: 58
End: 2018-05-01
Attending: UROLOGY
Payer: COMMERCIAL

## 2018-05-01 DIAGNOSIS — N20.0 KIDNEY STONES: ICD-10-CM

## 2018-05-01 LAB
ANION GAP SERPL CALCULATED.3IONS-SCNC: 6 MMOL/L (ref 4–13)
BASOPHILS # BLD AUTO: 0.03 THOUSANDS/ΜL (ref 0–0.1)
BASOPHILS NFR BLD AUTO: 0 % (ref 0–1)
BUN SERPL-MCNC: 13 MG/DL (ref 5–25)
CALCIUM SERPL-MCNC: 8.8 MG/DL (ref 8.3–10.1)
CHLORIDE SERPL-SCNC: 104 MMOL/L (ref 100–108)
CO2 SERPL-SCNC: 30 MMOL/L (ref 21–32)
CREAT SERPL-MCNC: 1.12 MG/DL (ref 0.6–1.3)
EOSINOPHIL # BLD AUTO: 0.36 THOUSAND/ΜL (ref 0–0.61)
EOSINOPHIL NFR BLD AUTO: 4 % (ref 0–6)
ERYTHROCYTE [DISTWIDTH] IN BLOOD BY AUTOMATED COUNT: 13.5 % (ref 11.6–15.1)
GFR SERPL CREATININE-BSD FRML MDRD: 72 ML/MIN/1.73SQ M
GLUCOSE P FAST SERPL-MCNC: 108 MG/DL (ref 65–99)
HCT VFR BLD AUTO: 42.6 % (ref 36.5–49.3)
HGB BLD-MCNC: 14.8 G/DL (ref 12–17)
LYMPHOCYTES # BLD AUTO: 1.62 THOUSANDS/ΜL (ref 0.6–4.47)
LYMPHOCYTES NFR BLD AUTO: 18 % (ref 14–44)
MCH RBC QN AUTO: 30.3 PG (ref 26.8–34.3)
MCHC RBC AUTO-ENTMCNC: 34.7 G/DL (ref 31.4–37.4)
MCV RBC AUTO: 87 FL (ref 82–98)
MONOCYTES # BLD AUTO: 0.45 THOUSAND/ΜL (ref 0.17–1.22)
MONOCYTES NFR BLD AUTO: 5 % (ref 4–12)
NEUTROPHILS # BLD AUTO: 6.73 THOUSANDS/ΜL (ref 1.85–7.62)
NEUTS SEG NFR BLD AUTO: 73 % (ref 43–75)
NRBC BLD AUTO-RTO: 0 /100 WBCS
PLATELET # BLD AUTO: 187 THOUSANDS/UL (ref 149–390)
PMV BLD AUTO: 10.6 FL (ref 8.9–12.7)
POTASSIUM SERPL-SCNC: 4.1 MMOL/L (ref 3.5–5.3)
RBC # BLD AUTO: 4.89 MILLION/UL (ref 3.88–5.62)
SODIUM SERPL-SCNC: 140 MMOL/L (ref 136–145)
WBC # BLD AUTO: 9.19 THOUSAND/UL (ref 4.31–10.16)

## 2018-05-01 PROCEDURE — 36415 COLL VENOUS BLD VENIPUNCTURE: CPT

## 2018-05-01 PROCEDURE — 80048 BASIC METABOLIC PNL TOTAL CA: CPT

## 2018-05-01 PROCEDURE — 85025 COMPLETE CBC W/AUTO DIFF WBC: CPT

## 2018-05-02 PROBLEM — E66.3 OVERWEIGHT: Status: ACTIVE | Noted: 2018-05-02

## 2018-05-02 PROBLEM — Z12.11 SCREENING FOR COLON CANCER: Status: ACTIVE | Noted: 2018-05-02

## 2018-05-04 NOTE — PROGRESS NOTES
Pre-op visit  5/7/2018      Chief Complaint   Patient presents with    Nephrolithiasis       Assessment and Plan     62 y o  male managed by Dr Sridhar Carias    1  Left 1 2 cm renal pelvis calculus    History and physical was performed for the patients upcoming left ESWL scheduled for 5/17/18  All questions and concerns regarding surgery have been addressed and answered  Will proceed with surgery as planned  History of Present Illness  Jimbo Lopez is a 62 y o  male here for history and physical prior to their upcoming left ESWL for left 1 2 cm renal pelvic calculi  The patient has had no overall changes in their health since their last visit and denies any prior complications with anesthesia aside for post op headache and nausea  Review of Systems   Constitutional: Negative for activity change, chills and fever  Gastrointestinal: Negative for abdominal distention and abdominal pain  Musculoskeletal: Negative for back pain and gait problem  Psychiatric/Behavioral: Negative for behavioral problems and confusion         Past Medical History  Past Medical History:   Diagnosis Date    Aneurysm of thoracic aorta ([address] )     last assessed 11/20/17    Anxiety     currently on no meds    Cardiac disease     Cholelithiasis     Chronic kidney disease     GERD (gastroesophageal reflux disease)     Headache due to anesthesia     Hyperlipidemia     Hypertension     Irritable bowel syndrome     Kidney stone     Palpitations     PONV (postoperative nausea and vomiting)     Shortness of breath     ? related to acid reflux    Sleep apnea     not sure    Stroke [facility]) 11/2014    TIA (transient ischemic attack)        Past Social History  Past Surgical History:   Procedure Laterality Date    AORTA SURGERY      thoracic - aneurysmorrhaphy    APPENDECTOMY      ASCENDING AORTIC ANEURYSM REPAIR      resolved 8/19/15    CHOLECYSTECTOMY      laparoscopic    CYSTOSCOPY      with removal of object- stent removal    KNEE ARTHROSCOPY Right 1996    with medial meniscus repair    LITHOTRIPSY      renal    THUMB ARTHROSCOPY Right 1976    ligament repair       Past Family History  Family History   Problem Relation Age of Onset    Diverticulitis Mother      of colon    Nephrolithiasis Mother     Emphysema Father     Nephrolithiasis Sister     Heart attack Maternal Grandfather 72    Breast cancer Paternal Grandmother     Lung cancer Paternal Grandfather     Cancer Family     Coronary artery disease Family     Diabetes Family      sibling    Hypertension Family      sibling    Hernia Family      sibling       Past Social history  Social History     Social History    Marital status: Single     Spouse name: N/A    Number of children: N/A    Years of education: N/A     Occupational History    disabled        Social History Main Topics    Smoking status: Never Smoker    Smokeless tobacco: Never Used      Comment: no second hand smoke exposure    Alcohol use No    Drug use: No    Sexual activity: Not on file     Other Topics Concern    Not on file     Social History Narrative    Caffeine use    Completed some college     as per [employer]           Current Medications  Current Outpatient Prescriptions   Medication Sig Dispense Refill    aspirin 325 mg tablet Take 325 mg by mouth daily      losartan (COZAAR) 25 mg tablet Take 1 tablet (25 mg total) by mouth daily 30 tablet 11    metoprolol tartrate (LOPRESSOR) 50 mg tablet Take 50 mg by mouth every 12 (twelve) hours        mometasone (ELOCON) 0 1 % cream Apply topically daily 45 g 0    pantoprazole (PROTONIX) 40 mg tablet Take 1 tablet (40 mg total) by mouth daily 90 tablet 3    rosuvastatin (CRESTOR) 20 MG tablet Take 1 tablet by mouth daily      amoxicillin (AMOXIL) 500 mg capsule TAKE 4 CAPSULES 1 HOUR PRIOR TO APPOINTMENT  1    Na Sulfate-K Sulfate-Mg Sulf (SUPREP BOWEL PREP KIT) 17 5-3 13-1 6 GM/180ML SOLN Take 177 mL by mouth once for 1 dose 2 Bottle 0     No current facility-administered medications for this visit  Allergies  Allergies   Allergen Reactions    Bactrim [Sulfamethoxazole-Trimethoprim]     Lisinopril      Felt bad, was OK with Zestril brand name   Tramadol          Past Medical History, Social History, Family History, medications and allergies were reviewed  Vitals  Vitals:    05/07/18 1306   BP: 120/78   Pulse: 64   Weight: 112 kg (247 lb)       Physical Exam    Constitutional   General appearance: Patient is seated and in no acute distress, well appearing and well nourished  Head and Face   Head and face: Normal     Eyes   Conjunctiva and lids: No erythema, swelling or discharge  Ears, Nose, Mouth, and Throat   Hearing: Normal     Pulmonary   Lungs: Clear to auscultation with no wheezes, rhonchi, or rales  Respiratory effort: No increased work of breathing or signs of respiratory distress  Cardiovascular   Heart: Regular rate and rhythm, no gallops, no murmurs  Examination of extremities for edema and/or varicosities: Normal     Abdomen   Abdomen: Non-tender, no masses  Musculoskeletal   Gait and station: Normal      Skin   Skin and subcutaneous tissue: Warm, dry, and intact  No visible rashes or lesions     Psychiatric   Mood and affect: Normal

## 2018-05-07 ENCOUNTER — PREP FOR PROCEDURE (OUTPATIENT)
Dept: UROLOGY | Facility: AMBULATORY SURGERY CENTER | Age: 58
End: 2018-05-07

## 2018-05-07 ENCOUNTER — ANESTHESIA EVENT (OUTPATIENT)
Dept: PERIOP | Facility: AMBULARY SURGERY CENTER | Age: 58
End: 2018-05-07
Payer: COMMERCIAL

## 2018-05-07 ENCOUNTER — OFFICE VISIT (OUTPATIENT)
Dept: UROLOGY | Facility: CLINIC | Age: 58
End: 2018-05-07
Payer: COMMERCIAL

## 2018-05-07 VITALS
WEIGHT: 247 LBS | SYSTOLIC BLOOD PRESSURE: 120 MMHG | DIASTOLIC BLOOD PRESSURE: 78 MMHG | BODY MASS INDEX: 37.56 KG/M2 | HEART RATE: 64 BPM

## 2018-05-07 DIAGNOSIS — N20.0 KIDNEY STONES: Primary | ICD-10-CM

## 2018-05-07 PROCEDURE — 99213 OFFICE O/P EST LOW 20 MIN: CPT | Performed by: PHYSICIAN ASSISTANT

## 2018-05-08 DIAGNOSIS — E78.2 MIXED HYPERLIPIDEMIA: Primary | ICD-10-CM

## 2018-05-09 RX ORDER — ROSUVASTATIN CALCIUM 20 MG/1
TABLET, COATED ORAL
Qty: 30 TABLET | Refills: 5 | Status: SHIPPED | OUTPATIENT
Start: 2018-05-09 | End: 2018-11-09 | Stop reason: SDUPTHER

## 2018-05-17 ENCOUNTER — ANESTHESIA (OUTPATIENT)
Dept: PERIOP | Facility: AMBULARY SURGERY CENTER | Age: 58
End: 2018-05-17
Payer: COMMERCIAL

## 2018-05-17 ENCOUNTER — HOSPITAL ENCOUNTER (OUTPATIENT)
Facility: AMBULARY SURGERY CENTER | Age: 58
Setting detail: OUTPATIENT SURGERY
Discharge: HOME | End: 2018-05-17
Attending: UROLOGY | Admitting: UROLOGY
Payer: COMMERCIAL

## 2018-05-17 VITALS
HEIGHT: 68 IN | HEART RATE: 66 BPM | DIASTOLIC BLOOD PRESSURE: 95 MMHG | TEMPERATURE: 97.3 F | WEIGHT: 243 LBS | RESPIRATION RATE: 16 BRPM | OXYGEN SATURATION: 98 % | SYSTOLIC BLOOD PRESSURE: 174 MMHG | BODY MASS INDEX: 36.83 KG/M2

## 2018-05-17 DIAGNOSIS — N20.0 KIDNEY STONES: Primary | ICD-10-CM

## 2018-05-17 PROCEDURE — 50590 FRAGMENTING OF KIDNEY STONE: CPT | Performed by: UROLOGY

## 2018-05-17 RX ORDER — CEPHALEXIN 500 MG/1
500 CAPSULE ORAL 3 TIMES DAILY
Qty: 9 CAPSULE | Refills: 0 | Status: SHIPPED | OUTPATIENT
Start: 2018-05-17 | End: 2018-05-20

## 2018-05-17 RX ORDER — HYDROCODONE BITARTRATE AND ACETAMINOPHEN 5; 325 MG/1; MG/1
2 TABLET ORAL EVERY 6 HOURS PRN
Qty: 10 TABLET | Refills: 0 | Status: SHIPPED | OUTPATIENT
Start: 2018-05-17 | End: 2018-05-27

## 2018-05-17 RX ORDER — ONDANSETRON 2 MG/ML
INJECTION INTRAMUSCULAR; INTRAVENOUS AS NEEDED
Status: DISCONTINUED | OUTPATIENT
Start: 2018-05-17 | End: 2018-05-17 | Stop reason: SURG

## 2018-05-17 RX ORDER — LIDOCAINE HYDROCHLORIDE 10 MG/ML
INJECTION, SOLUTION INFILTRATION; PERINEURAL AS NEEDED
Status: DISCONTINUED | OUTPATIENT
Start: 2018-05-17 | End: 2018-05-17 | Stop reason: SURG

## 2018-05-17 RX ORDER — MIDAZOLAM HYDROCHLORIDE 1 MG/ML
INJECTION INTRAMUSCULAR; INTRAVENOUS AS NEEDED
Status: DISCONTINUED | OUTPATIENT
Start: 2018-05-17 | End: 2018-05-17 | Stop reason: SURG

## 2018-05-17 RX ORDER — HYDROCODONE BITARTRATE AND ACETAMINOPHEN 5; 325 MG/1; MG/1
2 TABLET ORAL EVERY 4 HOURS PRN
Status: DISCONTINUED | OUTPATIENT
Start: 2018-05-17 | End: 2018-05-17 | Stop reason: HOSPADM

## 2018-05-17 RX ORDER — FENTANYL CITRATE 50 UG/ML
INJECTION, SOLUTION INTRAMUSCULAR; INTRAVENOUS AS NEEDED
Status: DISCONTINUED | OUTPATIENT
Start: 2018-05-17 | End: 2018-05-17 | Stop reason: SURG

## 2018-05-17 RX ORDER — FENTANYL CITRATE/PF 50 MCG/ML
25 SYRINGE (ML) INJECTION
Status: DISCONTINUED | OUTPATIENT
Start: 2018-05-17 | End: 2018-05-17 | Stop reason: HOSPADM

## 2018-05-17 RX ORDER — LABETALOL HYDROCHLORIDE 5 MG/ML
10 INJECTION, SOLUTION INTRAVENOUS
Status: DISCONTINUED | OUTPATIENT
Start: 2018-05-17 | End: 2018-05-17 | Stop reason: HOSPADM

## 2018-05-17 RX ORDER — LABETALOL HYDROCHLORIDE 5 MG/ML
10 INJECTION, SOLUTION INTRAVENOUS ONCE
Status: COMPLETED | OUTPATIENT
Start: 2018-05-17 | End: 2018-05-17

## 2018-05-17 RX ORDER — SODIUM CHLORIDE, SODIUM LACTATE, POTASSIUM CHLORIDE, CALCIUM CHLORIDE 600; 310; 30; 20 MG/100ML; MG/100ML; MG/100ML; MG/100ML
125 INJECTION, SOLUTION INTRAVENOUS CONTINUOUS
Status: DISCONTINUED | OUTPATIENT
Start: 2018-05-17 | End: 2018-05-17 | Stop reason: HOSPADM

## 2018-05-17 RX ORDER — METOCLOPRAMIDE HYDROCHLORIDE 5 MG/ML
INJECTION INTRAMUSCULAR; INTRAVENOUS AS NEEDED
Status: DISCONTINUED | OUTPATIENT
Start: 2018-05-17 | End: 2018-05-17 | Stop reason: SURG

## 2018-05-17 RX ORDER — PROPOFOL 10 MG/ML
INJECTION, EMULSION INTRAVENOUS AS NEEDED
Status: DISCONTINUED | OUTPATIENT
Start: 2018-05-17 | End: 2018-05-17 | Stop reason: SURG

## 2018-05-17 RX ORDER — SODIUM CHLORIDE 9 MG/ML
125 INJECTION, SOLUTION INTRAVENOUS CONTINUOUS
Status: DISCONTINUED | OUTPATIENT
Start: 2018-05-17 | End: 2018-05-17 | Stop reason: HOSPADM

## 2018-05-17 RX ORDER — ONDANSETRON 2 MG/ML
4 INJECTION INTRAMUSCULAR; INTRAVENOUS ONCE AS NEEDED
Status: DISCONTINUED | OUTPATIENT
Start: 2018-05-17 | End: 2018-05-17 | Stop reason: HOSPADM

## 2018-05-17 RX ADMIN — METOCLOPRAMIDE HYDROCHLORIDE 10 MG: 5 INJECTION INTRAMUSCULAR; INTRAVENOUS at 07:39

## 2018-05-17 RX ADMIN — LABETALOL 20 MG/4 ML (5 MG/ML) INTRAVENOUS SYRINGE 10 MG: at 09:46

## 2018-05-17 RX ADMIN — LIDOCAINE HYDROCHLORIDE 50 MG: 10 INJECTION, SOLUTION INFILTRATION; PERINEURAL at 07:35

## 2018-05-17 RX ADMIN — FENTANYL CITRATE 50 MCG: 50 INJECTION, SOLUTION INTRAMUSCULAR; INTRAVENOUS at 07:35

## 2018-05-17 RX ADMIN — LABETALOL 20 MG/4 ML (5 MG/ML) INTRAVENOUS SYRINGE 10 MG: at 09:27

## 2018-05-17 RX ADMIN — SODIUM CHLORIDE, SODIUM LACTATE, POTASSIUM CHLORIDE, AND CALCIUM CHLORIDE: .6; .31; .03; .02 INJECTION, SOLUTION INTRAVENOUS at 07:31

## 2018-05-17 RX ADMIN — MIDAZOLAM HYDROCHLORIDE 2 MG: 1 INJECTION, SOLUTION INTRAMUSCULAR; INTRAVENOUS at 07:35

## 2018-05-17 RX ADMIN — ONDANSETRON 4 MG: 2 INJECTION INTRAMUSCULAR; INTRAVENOUS at 07:35

## 2018-05-17 RX ADMIN — PROPOFOL 200 MG: 10 INJECTION, EMULSION INTRAVENOUS at 07:35

## 2018-05-17 RX ADMIN — CEFAZOLIN SODIUM 2000 MG: 2 SOLUTION INTRAVENOUS at 07:34

## 2018-05-17 NOTE — H&P (VIEW-ONLY)
Pre-op visit  5/7/2018      Chief Complaint   Patient presents with    Nephrolithiasis       Assessment and Plan     62 y o  male managed by Dr Uche Araiza    1  Left 1 2 cm renal pelvis calculus    History and physical was performed for the patients upcoming left ESWL scheduled for 5/17/18  All questions and concerns regarding surgery have been addressed and answered  Will proceed with surgery as planned  History of Present Illness  Pedro Dickey is a 62 y o  male here for history and physical prior to their upcoming left ESWL for left 1 2 cm renal pelvic calculi  The patient has had no overall changes in their health since their last visit and denies any prior complications with anesthesia aside for post op headache and nausea  Review of Systems   Constitutional: Negative for activity change, chills and fever  Gastrointestinal: Negative for abdominal distention and abdominal pain  Musculoskeletal: Negative for back pain and gait problem  Psychiatric/Behavioral: Negative for behavioral problems and confusion         Past Medical History  Past Medical History:   Diagnosis Date    Aneurysm of thoracic aorta ([address] )     last assessed 11/20/17    Anxiety     currently on no meds    Cardiac disease     Cholelithiasis     Chronic kidney disease     GERD (gastroesophageal reflux disease)     Headache due to anesthesia     Hyperlipidemia     Hypertension     Irritable bowel syndrome     Kidney stone     Palpitations     PONV (postoperative nausea and vomiting)     Shortness of breath     ? related to acid reflux    Sleep apnea     not sure    Stroke [facility] 11/2014    TIA (transient ischemic attack)        Past Social History  Past Surgical History:   Procedure Laterality Date    AORTA SURGERY      thoracic - aneurysmorrhaphy    APPENDECTOMY      ASCENDING AORTIC ANEURYSM REPAIR      resolved 8/19/15    CHOLECYSTECTOMY      laparoscopic    CYSTOSCOPY      with removal of object- stent removal    KNEE ARTHROSCOPY Right 1996    with medial meniscus repair    LITHOTRIPSY      renal    THUMB ARTHROSCOPY Right 1976    ligament repair       Past Family History  Family History   Problem Relation Age of Onset    Diverticulitis Mother      of colon    Nephrolithiasis Mother     Emphysema Father     Nephrolithiasis Sister     Heart attack Maternal Grandfather 72    Breast cancer Paternal Grandmother     Lung cancer Paternal Grandfather     Cancer Family     Coronary artery disease Family     Diabetes Family      sibling    Hypertension Family      sibling    Hernia Family      sibling       Past Social history  Social History     Social History    Marital status: Single     Spouse name: N/A    Number of children: N/A    Years of education: N/A     Occupational History    disabled        Social History Main Topics    Smoking status: Never Smoker    Smokeless tobacco: Never Used      Comment: no second hand smoke exposure    Alcohol use No    Drug use: No    Sexual activity: Not on file     Other Topics Concern    Not on file     Social History Narrative    Caffeine use    Completed some college     as per [employer]           Current Medications  Current Outpatient Prescriptions   Medication Sig Dispense Refill    aspirin 325 mg tablet Take 325 mg by mouth daily      losartan (COZAAR) 25 mg tablet Take 1 tablet (25 mg total) by mouth daily 30 tablet 11    metoprolol tartrate (LOPRESSOR) 50 mg tablet Take 50 mg by mouth every 12 (twelve) hours        mometasone (ELOCON) 0 1 % cream Apply topically daily 45 g 0    pantoprazole (PROTONIX) 40 mg tablet Take 1 tablet (40 mg total) by mouth daily 90 tablet 3    rosuvastatin (CRESTOR) 20 MG tablet Take 1 tablet by mouth daily      amoxicillin (AMOXIL) 500 mg capsule TAKE 4 CAPSULES 1 HOUR PRIOR TO APPOINTMENT  1    Na Sulfate-K Sulfate-Mg Sulf (SUPREP BOWEL PREP KIT) 17 5-3 13-1 6 GM/180ML SOLN Take 177 mL by mouth once for 1 dose 2 Bottle 0     No current facility-administered medications for this visit  Allergies  Allergies   Allergen Reactions    Bactrim [Sulfamethoxazole-Trimethoprim]     Lisinopril      Felt bad, was OK with Zestril brand name   Tramadol          Past Medical History, Social History, Family History, medications and allergies were reviewed  Vitals  Vitals:    05/07/18 1306   BP: 120/78   Pulse: 64   Weight: 112 kg (247 lb)       Physical Exam    Constitutional   General appearance: Patient is seated and in no acute distress, well appearing and well nourished  Head and Face   Head and face: Normal     Eyes   Conjunctiva and lids: No erythema, swelling or discharge  Ears, Nose, Mouth, and Throat   Hearing: Normal     Pulmonary   Lungs: Clear to auscultation with no wheezes, rhonchi, or rales  Respiratory effort: No increased work of breathing or signs of respiratory distress  Cardiovascular   Heart: Regular rate and rhythm, no gallops, no murmurs  Examination of extremities for edema and/or varicosities: Normal     Abdomen   Abdomen: Non-tender, no masses  Musculoskeletal   Gait and station: Normal      Skin   Skin and subcutaneous tissue: Warm, dry, and intact  No visible rashes or lesions     Psychiatric   Mood and affect: Normal

## 2018-05-17 NOTE — INTERVAL H&P NOTE
H&P reviewed  After examining the patient I find no changes in the patients condition since the H&P had been written  For L ESWL

## 2018-05-17 NOTE — DISCHARGE INSTRUCTIONS
Today you underwent lithotripsy  Your stone broke very well  Remain hydrated to pass the fragments  Pain medication and antibiotics as prescribed  Follow-up with Dr Lucy Avila in the next 8-12 weeks  Call for an appointment  [phone]  Lithotripsy   WHAT YOU NEED TO KNOW:   Lithotripsy is a procedure that uses sound waves to break up stones in the kidney, ureter, or bladder  The stone pieces then pass out of your body through your urine  You may have blood in your urine for 1 to 2 days after the procedure  You may also have bruising and discomfort in your back or abdomen  You may have pain whenever you pass pieces of your kidney stone  This may happen over a few weeks  DISCHARGE INSTRUCTIONS:   Call 911 for any of the following:   · You have chest pain  · You have trouble thinking clearly  · You have severe lower back pain  Seek care immediately if:   · You urinate bright red blood  · You have severe vomiting  · You cannot urinate  Contact your healthcare provider if:   · You have a fever and chills  · You feel burning when you urinate  · You feel the urge to urinate often and immediately  · You have questions or concerns about your condition or care  Medicines:   · Medicines  can help decrease pain or prevent an infection  Medicines may also help you pass the stones or prevent more stones from forming  · Take your medicine as directed  Contact your healthcare provider if you think your medicine is not helping or if you have side effects  Tell him or her if you are allergic to any medicine  Keep a list of the medicines, vitamins, and herbs you take  Include the amounts, and when and why you take them  Bring the list or the pill bottles to follow-up visits  Carry your medicine list with you in case of an emergency  Self-care:   · Strain your urine every time you go to the bathroom  Urinate through a strainer or a piece of thin cloth to catch the stones   Take the pieces to your follow-up visits  · If you have a stent, do not pull on it  This can cause pain and bleeding  · Apply heat  on your lower back for 20 to 30 minutes every 2 hours for as many days as directed  Heat helps decrease pain and muscle spasms  · Drink liquids as directed  You may need to drink more liquid than usual  This will help flush any remaining small pieces of stone  Liquids can also help prevent more stones from forming  Ask how much liquid to drink each day and which liquids are best for you  Do not drink caffeine  · Rest  when you feel it is needed  Slowly start to do more each day  · Eat a variety of healthy foods  Ask if you need to make changes to the foods you eat  Healthy foods include fruits, vegetables, whole-grain breads, low-fat dairy products, beans, and fish  You may need to limit nuts, chocolate, coffee, and certain green leafy vegetables  You may also need to limit meat and salt  Follow up with your healthcare provider as directed: You may need to return to have your stent removed  Write down your questions so you remember to ask them during your visits  © 2017 [address] Information is for End User's use only and may not be sold, redistributed or otherwise used for commercial purposes  All illustrations and images included in CareNotes® are the copyrighted property of [employer]  or [employer]go[employer]  The above information is an  only  It is not intended as medical advice for individual conditions or treatments  Talk to your doctor, nurse or pharmacist before following any medical regimen to see if it is safe and effective for you

## 2018-05-17 NOTE — ANESTHESIA PREPROCEDURE EVALUATION
Review of Systems/Medical History      History of anesthetic complications PONV    Cardiovascular  Hyperlipidemia, Hypertension ,   Comment: Aortic aneurysm repaired 3 yeas ago ,  Pulmonary  Shortness of breath, Sleep apnea ,        GI/Hepatic    GERD well controlled,        Kidney stones,        Endo/Other    Obesity    GYN       Hematology  Negative hematology ROS      Musculoskeletal  Negative musculoskeletal ROS        Neurology    TIA, CVA , no residual symptoms, Headaches,    Psychology   Anxiety,              Physical Exam    Airway    Mallampati score: II  TM Distance: >3 FB  Neck ROM: full     Dental   No notable dental hx     Cardiovascular      Pulmonary      Other Findings        Anesthesia Plan  ASA Score- 3     Anesthesia Type- general with ASA Monitors  Additional Monitors:   Airway Plan: LMA  Plan Factors-    Induction- intravenous  Postoperative Plan-     Informed Consent- Anesthetic plan and risks discussed with patient  I personally reviewed this patient with the CRNA  Discussed and agreed on the Anesthesia Plan with the CRNA  Gabrielle Matos

## 2018-05-17 NOTE — ANESTHESIA POSTPROCEDURE EVALUATION
Post-Op Assessment Note      CV Status:  Stable    Mental Status:  Alert and awake    Hydration Status:  Stable    PONV Controlled:  None    Airway Patency:  Patent    Post Op Vitals Reviewed: Yes          Staff: Anesthesiologist           BP     Temp     Pulse     Resp      SpO2

## 2018-05-17 NOTE — OP NOTE
OPERATIVE REPORT  PATIENT NAME: Scotty Breen    :  1960  MRN: [id number]  Pt Location: [facility] OR ROOM 05    SURGERY DATE: 2018    Surgeon(s) and Role:     * Mt Villalobos MD - Primary    Preop Diagnosis:  Kidney stones [N20 0]  Left 12 mm renal calculus     Post-Op Diagnosis Codes:     * Kidney stones [N20 0]  Left 12 mm renal calculus    Procedure(s) (LRB):  LITHROTRIPSY EXTRACORPORAL SHOCKWAVE (ESWL) (Left)    Specimen(s):  * No specimens in log *    Estimated Blood Loss:   Minimal    Drains:  None       Anesthesia Type:   General    Operative Indications:  Kidney stones [N20 0]      Operative Findings:  Excellent fragmentation of a left 12 mm lower pole calculus    Complications:   None    Procedure and Technique:  Era Hayden is a 40-year-old male with an 12 mm left lower pole renal calculus  He presents to the operating room today to undergo left ESWL  Risk and benefits of the procedure were discussed and reviewed  Informed consent was obtained  The patient was brought to the operating room on May 17, 2018  The stone was first visualized under fluoroscopic imaging  The patient was then placed under general LMA anesthesia  Intravenous antibiotics were administered  A timeout was performed with all members of the operative team confirming the patient's identity, procedure to be performed, and laterality of the case  Left ESWL was then performed in the standard fashion  3000 shocks were delivered to the stone with excellent fragmentation noted  Overall the patient tolerated the procedure well and there were no complications  The patient was extubated in the operating room and transferred to the PACU in stable condition at the conclusion of the case      Patient Disposition:  PACU stable and extubated    SIGNATURE: Mt Villalobos MD  DATE: May 17, 2018  TIME: 8:09 AM

## 2018-05-18 ENCOUNTER — TELEPHONE (OUTPATIENT)
Dept: UROLOGY | Facility: CLINIC | Age: 58
End: 2018-05-18

## 2018-05-18 DIAGNOSIS — N20.0 NEPHROLITHIASIS: Primary | ICD-10-CM

## 2018-05-18 NOTE — TELEPHONE ENCOUNTER
I phoned patient for an update  He stated that he feels like he passed a fragment and is improving  Patient knows that he can always call the doctor on call over the weekend if he has any questions  He also knows that if he develops fever or vomiting to go to the ER

## 2018-05-18 NOTE — TELEPHONE ENCOUNTER
Per Dr Fred Young, patient to be seen by NP post op ESWL in 12 weeks with KUB prior    Rx for KUB in EPIC

## 2018-05-18 NOTE — TELEPHONE ENCOUNTER
Patient had just called and stated that he feels bloated , sweating, dizzy, pain in kidney area (front and back)  He states the pain meds are only effective for 1 hour  Per Dr Javy Andrade, patient to increase liquids and if symptoms persist, he is to go to the ER    I explained to the patient that he is probably passing fragments of stones

## 2018-05-21 ENCOUNTER — TELEPHONE (OUTPATIENT)
Dept: UROLOGY | Facility: CLINIC | Age: 58
End: 2018-05-21

## 2018-05-21 NOTE — TELEPHONE ENCOUNTER
I spoke with patient and informed him that Dr Michelle Medina does not want him to have any more narcotics

## 2018-05-21 NOTE — TELEPHONE ENCOUNTER
Patient is scheduled for follow up on 8/16/18  I spoke with patient and explained to him that I will be sending him an order for kub to be done 1 week prior to his appointment  Patient also was requesting more pain medication and is requesting to speak to you

## 2018-07-03 DIAGNOSIS — I10 HTN (HYPERTENSION), BENIGN: Primary | ICD-10-CM

## 2018-07-03 RX ORDER — METOPROLOL TARTRATE 50 MG/1
TABLET, FILM COATED ORAL
Qty: 60 TABLET | Refills: 4 | Status: SHIPPED | OUTPATIENT
Start: 2018-07-03 | End: 2018-12-06 | Stop reason: SDUPTHER

## 2018-08-14 ENCOUNTER — APPOINTMENT (OUTPATIENT)
Dept: LAB | Facility: HOSPITAL | Age: 58
End: 2018-08-14
Payer: COMMERCIAL

## 2018-08-14 ENCOUNTER — HOSPITAL ENCOUNTER (OUTPATIENT)
Dept: RADIOLOGY | Facility: HOSPITAL | Age: 58
Discharge: HOME | End: 2018-08-14
Payer: COMMERCIAL

## 2018-08-14 DIAGNOSIS — N20.0 NEPHROLITHIASIS: ICD-10-CM

## 2018-08-14 DIAGNOSIS — I48.91 ATRIAL FIBRILLATION, UNSPECIFIED TYPE (HCC): ICD-10-CM

## 2018-08-14 DIAGNOSIS — I48.0 PAROXYSMAL ATRIAL FIBRILLATION (HCC): ICD-10-CM

## 2018-08-14 DIAGNOSIS — F41.1 GENERALIZED ANXIETY DISORDER: ICD-10-CM

## 2018-08-14 DIAGNOSIS — I25.810 ATHEROSCLEROSIS OF CORONARY ARTERY BYPASS GRAFT OF NATIVE HEART WITHOUT ANGINA PECTORIS: ICD-10-CM

## 2018-08-14 LAB
ALBUMIN SERPL BCP-MCNC: 3.9 G/DL (ref 3.5–5)
ALP SERPL-CCNC: 126 U/L (ref 46–116)
ALT SERPL W P-5'-P-CCNC: 40 U/L (ref 12–78)
ANION GAP SERPL CALCULATED.3IONS-SCNC: 5 MMOL/L (ref 4–13)
AST SERPL W P-5'-P-CCNC: 23 U/L (ref 5–45)
BILIRUB SERPL-MCNC: 1.65 MG/DL (ref 0.2–1)
BUN SERPL-MCNC: 11 MG/DL (ref 5–25)
CALCIUM SERPL-MCNC: 8.9 MG/DL (ref 8.3–10.1)
CHLORIDE SERPL-SCNC: 104 MMOL/L (ref 100–108)
CHOLEST SERPL-MCNC: 100 MG/DL (ref 50–200)
CO2 SERPL-SCNC: 29 MMOL/L (ref 21–32)
CREAT SERPL-MCNC: 1.1 MG/DL (ref 0.6–1.3)
GFR SERPL CREATININE-BSD FRML MDRD: 74 ML/MIN/1.73SQ M
GLUCOSE P FAST SERPL-MCNC: 103 MG/DL (ref 65–99)
HDLC SERPL-MCNC: 34 MG/DL (ref 40–60)
LDLC SERPL CALC-MCNC: 49 MG/DL (ref 0–100)
NONHDLC SERPL-MCNC: 66 MG/DL
POTASSIUM SERPL-SCNC: 3.7 MMOL/L (ref 3.5–5.3)
PROT SERPL-MCNC: 7.4 G/DL (ref 6.4–8.2)
SODIUM SERPL-SCNC: 138 MMOL/L (ref 136–145)
TRIGL SERPL-MCNC: 85 MG/DL

## 2018-08-14 PROCEDURE — 74018 RADEX ABDOMEN 1 VIEW: CPT

## 2018-08-14 PROCEDURE — 80061 LIPID PANEL: CPT

## 2018-08-14 PROCEDURE — 80053 COMPREHEN METABOLIC PANEL: CPT

## 2018-08-14 PROCEDURE — 36415 COLL VENOUS BLD VENIPUNCTURE: CPT

## 2018-08-14 NOTE — PROGRESS NOTES
8/16/2018      Chief Complaint   Patient presents with    Nephrolithiasis     s/p ESWL (5/17)       Assessment and Plan    62 y o  male managed by Dr Lc Oropeza    1  Mild peyronies disease  - stable     2  Routine prostate cancer screening  - PSA 0 7 (4/19/18)  - REGI 1/2018 normal    3  Left 1 2 cm renal pelvis calculus s/p ESWL 5/17/18  - no current stone burden  - will send stone fragments out for analysis  - nephrology referral   - kidney stone education handout provided     - FU 1 year with KUB, U/S, and PSA prior and REGI at visit       History of Present Illness  Calvin Smith is a 62 y o  male here for follow up evaluation of mild peroneus disease, routine prostate cancer screening, and a left 12 mm renal pelvic calculi status post ESWL 5/17/2018  The patient has been doing well since the time of his surgery  He drinks multiple stone fragments with him today  His lower urinary tract symptoms are listed as below  He is mildly bothered by these  His AUA symptom score is also listed  Review of Systems   Constitutional: Negative for activity change, chills and fever  Gastrointestinal: Negative for abdominal distention and abdominal pain  Musculoskeletal: Negative for back pain and gait problem  Psychiatric/Behavioral: Negative for behavioral problems and confusion  Urinary Incontinence Screening      Most Recent Value   Urinary Incontinence   Urinary Incontinence? No   Incomplete emptying? No   Urinary frequency? No   Urinary urgency? Yes [at times]   Urinary hesitancy? No   Dysuria (painful difficult urination)? No   Nocturia (waking up to use the bathroom)? Yes [2x]   Straining (having to push to go)? No   Weak stream?  No   Intermittent stream?  No   Post void dribbling? No        AUA SYMPTOM SCORE      Most Recent Value   AUA SYMPTOM SCORE   How often have you had a sensation of not emptying your bladder completely after you finished urinating?   0   How often have you had to urinate again less than two hours after you finished urinating? 0   How often have you found you stopped and started again several times when you urinate?  0   How often have you found it difficult to postpone urination? 1   How often have you had a weak urinary stream?  0   How often have you had to push or strain to begin urination? 0   How many times did you most typically get up to urinate from the time you went to bed at night until the time you got up in the morning? 2   Quality of Life: If you were to spend the rest of your life with your urinary condition just the way it is now, how would you feel about that?  2   AUA SYMPTOM SCORE  3          Past Medical History  Past Medical History:   Diagnosis Date    Aneurysm of thoracic aorta ([address] )     last assessed 11/20/17    Anxiety     currently on no meds    Cardiac disease     Cholelithiasis     Chronic kidney disease     GERD (gastroesophageal reflux disease)     Headache due to anesthesia     Hyperlipidemia     Hypertension     Irritable bowel syndrome     Kidney stone     Palpitations     PONV (postoperative nausea and vomiting)     Shortness of breath     ? related to acid reflux    Sleep apnea     not sure    Stroke [facility]) 11/2014    TIA (transient ischemic attack)        Past Social History  Past Surgical History:   Procedure Laterality Date    AORTA SURGERY      thoracic - aneurysmorrhaphy    APPENDECTOMY      ASCENDING AORTIC ANEURYSM REPAIR      resolved 8/19/15    CHOLECYSTECTOMY      laparoscopic    CYSTOSCOPY      with removal of object- stent removal    KNEE ARTHROSCOPY Right 1996    with medial meniscus repair    LITHOTRIPSY      renal    MD FRAGMENT KIDNEY STONE/ ESWL Left 5/17/2018    Procedure: LITHROTRIPSY EXTRACORPORAL SHOCKWAVE (ESWL);   Surgeon: Robbie Lynn MD;  [facility];  Service: Urology    THUMB ARTHROSCOPY Right 1976    ligament repair     History   Smoking Status    Never Smoker Smokeless Tobacco    Never Used     Comment: no second hand smoke exposure       Past Family History  Family History   Problem Relation Age of Onset    Diverticulitis Mother         of colon    Nephrolithiasis Mother     Emphysema Father     Nephrolithiasis Sister     Heart attack Maternal Grandfather 72    Breast cancer Paternal Grandmother     Lung cancer Paternal Grandfather     Cancer Family     Coronary artery disease Family     Diabetes Family         sibling    Hypertension Family         sibling    Hernia Family         sibling       Past Social history  Social History     Social History    Marital status:      Spouse name: N/A    Number of children: N/A    Years of education: N/A     Occupational History    disabled        Social History Main Topics    Smoking status: Never Smoker    Smokeless tobacco: Never Used      Comment: no second hand smoke exposure    Alcohol use No    Drug use: No    Sexual activity: Not on file     Other Topics Concern    Not on file     Social History Narrative    Caffeine use    Completed some college     as per [employer]           Current Medications  Current Outpatient Prescriptions   Medication Sig Dispense Refill    amoxicillin (AMOXIL) 500 mg capsule TAKE 4 CAPSULES 1 HOUR PRIOR TO APPOINTMENT  1    aspirin 325 mg tablet Take 325 mg by mouth daily      losartan (COZAAR) 25 mg tablet Take 1 tablet (25 mg total) by mouth daily 30 tablet 11    metoprolol tartrate (LOPRESSOR) 50 mg tablet TAKE 1 TABLET TWICE DAILY 60 tablet 4    pantoprazole (PROTONIX) 40 mg tablet Take 1 tablet (40 mg total) by mouth daily 90 tablet 3    rosuvastatin (CRESTOR) 20 MG tablet TAKE ONE TABLET BY MOUTH DAILY 30 tablet 5     No current facility-administered medications for this visit  Allergies  Allergies   Allergen Reactions    Bactrim [Sulfamethoxazole-Trimethoprim]     Lisinopril      Felt bad, was OK with Zestril brand name      Tramadol The following portions of the patient's history were reviewed and updated as appropriate: allergies, current medications, past medical history, past social history, past surgical history and problem list       Vitals  Vitals:    08/16/18 0833   BP: 150/98   Pulse: 58   Weight: 112 kg (246 lb)   Height: 5' 8" (1 727 m)       Physical Exam  Constitutional   General appearance: Patient is seated and in no acute distress, well appearing and well nourished  Head and Face   Head and face: Normal     Eyes   Conjunctiva and lids: No erythema, swelling or discharge  Ears, Nose, Mouth, and Throat   Hearing: Normal     Pulmonary   Respiratory effort: No increased work of breathing or signs of respiratory distress  Cardiovascular   Examination of extremities for edema and/or varicosities: Normal     Abdomen   Abdomen: Non-tender, no masses  Musculoskeletal   Gait and station: Normal     Skin   Skin and subcutaneous tissue: Warm, dry, and intact  No visible lesions or rashes  Psychiatric   Judgment and insight: Normal  Recent and remote memory:  Normal  Mood and affect: Normal      Results  No results found for this or any previous visit (from the past 1 hour(s))  ]  No results found for: PSA  Lab Results   Component Value Date    GLUCOSE 114 08/27/2017    CALCIUM 8 9 08/14/2018     08/14/2018    K 3 7 08/14/2018    CO2 29 08/14/2018     08/14/2018    BUN 11 08/14/2018    CREATININE 1 10 08/14/2018     Lab Results   Component Value Date    WBC 9 19 05/01/2018    HGB 14 8 05/01/2018    HCT 42 6 05/01/2018    MCV 87 05/01/2018     05/01/2018       Orders  Orders Placed This Encounter   Procedures    XR abdomen 1 view kub     Standing Status:   Future     Standing Expiration Date:   8/16/2022     Scheduling Instructions:      Bring along any outside films relating to this procedure             Order Specific Question:   Reason for Exam:     Answer:   calculi    US kidney and bladder Standing Status:   Future     Standing Expiration Date:   8/16/2022     Scheduling Instructions:      "Prep required if being scheduled in conjunction with other studies, refer to those examination's Preps first before scheduling  All patients for US Kidney and Bladder they must drink 24 oz of water 60 minutes before your scheduled appointment time  This test requires you to have a FULL bladder  Please do not urinate before your test             Please bring your physician order, insurance cards, a form of photo ID and a list of your medications with you  Arrive 15 minutes prior to your appointment time in order to register  If you need to have lab work or a urinalysis, please do this AFTER your ultrasound  "            To schedule this appointment, please contact Central Scheduling at [phone]  Order Specific Question:   Reason for Exam:     Answer:   calculi    PSA, Total Screen     This is a patient instruction: This test is non-fasting  Please drink two glasses of water morning of bloodwork          Standing Status:   Future     Standing Expiration Date:   2/16/2020    Ambulatory referral to Nephrology     Standing Status:   Future     Standing Expiration Date:   2/16/2019     Referral Priority:   Routine     Referral Type:   Consult - AMB     Referral Reason:   Specialty Services Required     Requested Specialty:   Nephrology     Number of Visits Requested:   1     Expiration Date:   8/16/2019

## 2018-08-16 ENCOUNTER — OFFICE VISIT (OUTPATIENT)
Dept: UROLOGY | Facility: CLINIC | Age: 58
End: 2018-08-16
Payer: COMMERCIAL

## 2018-08-16 VITALS
BODY MASS INDEX: 37.28 KG/M2 | WEIGHT: 246 LBS | HEART RATE: 58 BPM | HEIGHT: 68 IN | DIASTOLIC BLOOD PRESSURE: 98 MMHG | SYSTOLIC BLOOD PRESSURE: 150 MMHG

## 2018-08-16 DIAGNOSIS — N20.0 KIDNEY STONES: Primary | ICD-10-CM

## 2018-08-16 PROCEDURE — 82360 CALCULUS ASSAY QUANT: CPT | Performed by: PHYSICIAN ASSISTANT

## 2018-08-16 PROCEDURE — 99213 OFFICE O/P EST LOW 20 MIN: CPT | Performed by: PHYSICIAN ASSISTANT

## 2018-08-24 LAB
CA PHOS MFR STONE: 5 %
CALCIUM OXALATE DIHYDRATE MFR STONE IR: 10 %
COLOR STONE: NORMAL
COM MFR STONE: 85 %
COMMENT-STONE3: NORMAL
COMPOSITION: NORMAL
LABORATORY COMMENT REPORT: NORMAL
NIDUS STONE QL: NORMAL
PHOTO: NORMAL
SIZE STONE: NORMAL MM
STONE ANALYSIS-IMP: NORMAL
WT STONE: 73.1 MG

## 2018-09-05 ENCOUNTER — OFFICE VISIT (OUTPATIENT)
Dept: CARDIOLOGY CLINIC | Facility: CLINIC | Age: 58
End: 2018-09-05
Payer: COMMERCIAL

## 2018-09-05 VITALS
HEART RATE: 64 BPM | DIASTOLIC BLOOD PRESSURE: 98 MMHG | WEIGHT: 244 LBS | BODY MASS INDEX: 36.98 KG/M2 | SYSTOLIC BLOOD PRESSURE: 158 MMHG | HEIGHT: 68 IN

## 2018-09-05 DIAGNOSIS — I10 BENIGN ESSENTIAL HTN: ICD-10-CM

## 2018-09-05 DIAGNOSIS — E78.5 DYSLIPIDEMIA: ICD-10-CM

## 2018-09-05 DIAGNOSIS — Q23.1 BICUSPID AORTIC VALVE: Primary | ICD-10-CM

## 2018-09-05 PROCEDURE — 99214 OFFICE O/P EST MOD 30 MIN: CPT | Performed by: INTERNAL MEDICINE

## 2018-09-05 NOTE — PROGRESS NOTES
Cardiology Follow Up        Teo Marc      1960      [id number]      Discussion/Summary:    1  Hypertension:  Elevated on today's visit  Patient has not been checking at home, stating that his machine always gets higher number than what his actual blood pressure is, as he has tried to compared to his PCPs blood pressure monitor in the past   Have offered increasing losartan to improved blood pressure, but he would like to hold off, and check it more regularly home and consider purchase a new blood pressure monitor if needed  I have requested he contact me right away if his blood pressure remains elevated at home  3   Bicuspid aortic valve, without significant stenosis:  Reviewed echocardiogram report from August 2017, normal ejection fraction without aortic valve stenosis  Aortic valve leaflets poorly visualized  Will repeat with next visit in 4 months  4   History of significant ascending aortic aneurysm, status post replacement with matthew arch reconstruction in 2014 (bicuspid valve not replaced at that time):  Blood pressure control should be pursued  He expresses some concern regarding epigastric discomfort, stating that he thinks this may be from his drainage tubes being pulled out too fast after his surgery in 2014  He will discuss this further with Gastroenterology to see if imaging is necessary  5   Paroxysmal atrial fibrillation:  Postoperative in November 2014, transient, without any objective recurrence:  Normal rhythm on physical exam today    6  Palpitations:  Suspect benign by history, Holter monitor reviewed from October 2017 at which time palpitations correlated with sinus rhythm  No new complaint on today's visit  7   Chronic/stable exertional dyspnea:  Suspect multifactorial, possibly due to weight and deconditioning  No other signs or symptoms of congestive heart failure  Low suspicion of angina     Have offered stress testing, especially in light of atypical chest discomfort which sounds more like GERD  Patient would like Gastroenterology evaluation 1st before pursuing any stress testing  Will repeat echocardiogram with next visit in 4 months to re-evaluate aortic valve  Follow-up in 4 months with echocardiogram      Interval History: This is a very pleasant  male with a significant history of ascending aortic aneurysm, status post open replacement of the ascending aorta with matthew arch reconstruction on 11/06/2014  Preoperative cardiac catheterization was unremarkable  Postoperatively, he had transient atrial fibrillation, which converted to sinus rhythm on amiodarone/metoprolol, without any recurrence  Therefore he has not been maintained on anticoagulation while seeing Dr Rochelle Cordova in the past   He also has a history of bicuspid aortic valve without significant stenosis, hypertension  He was last hospitalized August 2017 for numbness on his face, left arm, and left leg, with MRI brain which was unremarkable for stroke, without any evidence of atrial fibrillation on telemetry  MRI cervical spine 09/2017 was unremarkable  Echocardiogram on 8/27/17 revealed a normal ejection fraction at 60% with no evidence of aortic stenosis and no regurgitation  The valve leaflets were not well visualized  Prior Holter monitor October 2017 revealed no evidence of atrial fibrillation, only occasional PACs and PVCs  He has chronic but stable exertional dyspnea  He admits to a discomfort in his chest that often occurs at night along with regurgitation of gastric contents into his mouth  He states he was on Prilosec, later changed to Protonix by his PCP  He has been planning to follow-up with gastroenterology in the near future  He denies any exertional chest discomfort, but as above has stable exertional dyspnea which is chronic  He does not exercise    He does not check his blood pressures at home, stating that his blood pressure monitor does not work well, and always reads a higher number        Vitals:  Vitals:    09/05/18 1117   BP: 158/98   BP Location: Right arm   Patient Position: Sitting   Cuff Size: Large   Pulse: 64   Weight: 111 kg (244 lb)   Height: 5' 8" (1 727 m)         Past Medical History:   Diagnosis Date    [facility] )     last assessed 11/20/17    Anxiety     currently on no meds    Cardiac disease     Cholelithiasis     Chronic kidney disease     GERD (gastroesophageal reflux disease)     Headache due to anesthesia     Hyperlipidemia     Hypertension     Irritable bowel syndrome     Kidney stone     Palpitations     PONV (postoperative nausea and vomiting)     Shortness of breath     ? related to acid reflux    Sleep apnea     not sure    Stroke ([facility] ) 11/2014    TIA (transient ischemic attack)      Social History     Social History    Marital status:      Spouse name: N/A    Number of children: N/A    Years of education: N/A     Occupational History    disabled        Social History Main Topics    Smoking status: Never Smoker    Smokeless tobacco: Never Used      Comment: no second hand smoke exposure    Alcohol use No    Drug use: No    Sexual activity: Not on file     Other Topics Concern    Not on file     Social History Narrative    Caffeine use    Completed some college     as per [employer]          Family History   Problem Relation Age of Onset    Diverticulitis Mother         of colon    Nephrolithiasis Mother     Emphysema Father     Nephrolithiasis Sister     Heart attack Maternal Grandfather 72    Breast cancer Paternal Grandmother     Lung cancer Paternal Grandfather     Cancer Family     Coronary artery disease Family     Diabetes Family         sibling    Hypertension Family         sibling    Hernia Family         sibling     Past Surgical History:   Procedure Laterality Date    AORTA SURGERY      thoracic - aneurysmorrhaphy    APPENDECTOMY      ASCENDING AORTIC ANEURYSM REPAIR      resolved 8/19/15    CHOLECYSTECTOMY      laparoscopic    CYSTOSCOPY      with removal of object- stent removal    KNEE ARTHROSCOPY Right 1996    with medial meniscus repair    LITHOTRIPSY      renal    AL FRAGMENT KIDNEY STONE/ ESWL Left 5/17/2018    Procedure: LITHROTRIPSY EXTRACORPORAL SHOCKWAVE (ESWL);   Surgeon: Salvatore Santos MD;  [facility];  Service: Urology    THUMB ARTHROSCOPY Right 1976    ligament repair       Current Outpatient Prescriptions:     amoxicillin (AMOXIL) 500 mg capsule, TAKE 4 CAPSULES 1 HOUR PRIOR TO APPOINTMENT, Disp: , Rfl: 1    aspirin 325 mg tablet, Take 325 mg by mouth daily, Disp: , Rfl:     losartan (COZAAR) 25 mg tablet, Take 1 tablet (25 mg total) by mouth daily, Disp: 30 tablet, Rfl: 11    metoprolol tartrate (LOPRESSOR) 50 mg tablet, TAKE 1 TABLET TWICE DAILY, Disp: 60 tablet, Rfl: 4    pantoprazole (PROTONIX) 40 mg tablet, Take 1 tablet (40 mg total) by mouth daily, Disp: 90 tablet, Rfl: 3    rosuvastatin (CRESTOR) 20 MG tablet, TAKE ONE TABLET BY MOUTH DAILY, Disp: 30 tablet, Rfl: 5    Labs:  Office Visit on 08/16/2018   Component Date Value    COLOR 08/16/2018 Jovany Enigma Size 08/16/2018 Comment     Stone Weight 08/16/2018 73 1     Composition 08/16/2018 Comment     Ca Oxalate,Dihydrate 08/16/2018 10     Ca Oxalate,Monohydr  08/16/2018 85     CALCIUM PHOSPHATE 08/16/2018 05     Nidus 08/16/2018 No Nidus visualized     STONE COMMENT 08/16/2018 Comment     Please note 08/16/2018 Comment     Disclaimer 08/16/2018 Comment     Photo 08/16/2018 Comment    Appointment on 08/14/2018   Component Date Value    Sodium 08/14/2018 138     Potassium 08/14/2018 3 7     Chloride 08/14/2018 104     CO2 08/14/2018 29     ANION GAP 08/14/2018 5     BUN 08/14/2018 11     Creatinine 08/14/2018 1 10     Glucose, Fasting 08/14/2018 103*    Calcium 08/14/2018 8 9     AST 08/14/2018 23     ALT 08/14/2018 40     Alkaline Phosphatase 08/14/2018 126*    Total Protein 08/14/2018 7 4     Albumin 08/14/2018 3 9     Total Bilirubin 08/14/2018 1 65*    eGFR 08/14/2018 74     Cholesterol 08/14/2018 100     Triglycerides 08/14/2018 85     HDL, Direct 08/14/2018 34*    LDL Calculated 08/14/2018 49     Non-HDL-Chol (CHOL-HDL) 08/14/2018 66          Review of Systems:  Review of Systems   Respiratory: Positive for shortness of breath  Cardiovascular: Negative for palpitations  Gastrointestinal:        +reflux symptoms   Neurological: Negative for light-headedness  All other systems reviewed and are negative  Physical Exam:  Physical Exam   Constitutional: He is oriented to person, place, and time  He appears well-developed and well-nourished  No distress  HENT:   Head: Normocephalic and atraumatic  Eyes: EOM are normal  Pupils are equal, round, and reactive to light  Right eye exhibits no discharge  No scleral icterus  Neck: Normal range of motion  Neck supple  No thyromegaly present  Cardiovascular: Normal rate, regular rhythm and normal heart sounds  Exam reveals no gallop and no friction rub  No murmur heard  Pulmonary/Chest: Effort normal and breath sounds normal    Abdominal: He exhibits no distension  There is no tenderness  There is no rebound and no guarding  Musculoskeletal: Normal range of motion  He exhibits no edema  Neurological: He is alert and oriented to person, place, and time  Coordination normal    Skin: Skin is warm and dry  No rash noted  He is not diaphoretic  No erythema  No pallor  Psychiatric: He has a normal mood and affect   His behavior is normal  Judgment and thought content normal

## 2018-09-05 NOTE — PATIENT INSTRUCTIONS
Follow-up with gastroenterology for consideration of endoscopy and treatment of GERD   If continued chest discomfort, will consider stress test next visit   Echocardiogram with next visit  Check BP at home

## 2018-10-09 DIAGNOSIS — E78.2 MIXED HYPERLIPIDEMIA: ICD-10-CM

## 2018-10-09 RX ORDER — ROSUVASTATIN CALCIUM 20 MG/1
20 TABLET, COATED ORAL DAILY
Qty: 90 TABLET | Refills: 0 | OUTPATIENT
Start: 2018-10-09

## 2018-11-09 DIAGNOSIS — E78.2 MIXED HYPERLIPIDEMIA: ICD-10-CM

## 2018-11-09 RX ORDER — ROSUVASTATIN CALCIUM 20 MG/1
TABLET, COATED ORAL
Qty: 30 TABLET | Refills: 5 | Status: SHIPPED | OUTPATIENT
Start: 2018-11-09 | End: 2019-02-27 | Stop reason: SDUPTHER

## 2018-12-06 DIAGNOSIS — I10 HTN (HYPERTENSION), BENIGN: ICD-10-CM

## 2018-12-06 RX ORDER — METOPROLOL TARTRATE 50 MG/1
50 TABLET, FILM COATED ORAL 2 TIMES DAILY
Qty: 180 TABLET | Refills: 1 | Status: SHIPPED | OUTPATIENT
Start: 2018-12-06 | End: 2019-06-03 | Stop reason: SDUPTHER

## 2018-12-11 DIAGNOSIS — I25.810 ATHEROSCLEROSIS OF CORONARY ARTERY BYPASS GRAFT OF NATIVE HEART WITHOUT ANGINA PECTORIS: ICD-10-CM

## 2018-12-11 DIAGNOSIS — I10 ESSENTIAL HYPERTENSION: ICD-10-CM

## 2018-12-11 RX ORDER — LOSARTAN POTASSIUM 25 MG/1
25 TABLET ORAL DAILY
Qty: 30 TABLET | Refills: 10 | Status: SHIPPED | OUTPATIENT
Start: 2018-12-11 | End: 2019-03-08 | Stop reason: SDUPTHER

## 2019-01-23 ENCOUNTER — OFFICE VISIT (OUTPATIENT)
Dept: CARDIOLOGY CLINIC | Facility: CLINIC | Age: 59
End: 2019-01-23
Payer: COMMERCIAL

## 2019-01-23 VITALS
BODY MASS INDEX: 39.1 KG/M2 | HEART RATE: 60 BPM | SYSTOLIC BLOOD PRESSURE: 142 MMHG | WEIGHT: 258 LBS | HEIGHT: 68 IN | DIASTOLIC BLOOD PRESSURE: 92 MMHG

## 2019-01-23 DIAGNOSIS — K43.9 VENTRAL HERNIA WITHOUT OBSTRUCTION OR GANGRENE: ICD-10-CM

## 2019-01-23 DIAGNOSIS — R07.89 ATYPICAL CHEST PAIN: ICD-10-CM

## 2019-01-23 DIAGNOSIS — I48.91 ATRIAL FIBRILLATION, UNSPECIFIED TYPE (HCC): Primary | ICD-10-CM

## 2019-01-23 PROCEDURE — 99214 OFFICE O/P EST MOD 30 MIN: CPT | Performed by: INTERNAL MEDICINE

## 2019-01-23 PROCEDURE — 93000 ELECTROCARDIOGRAM COMPLETE: CPT | Performed by: INTERNAL MEDICINE

## 2019-01-23 NOTE — PROGRESS NOTES
Cardiology Follow Up        David Dixon      1960      [id number]      Discussion/Summary:    1  Hypertension:  Although suboptimal on today's visit, he states systolic blood pressures at home have been running less than 130 mm Hg  Will continue current regimen  He kindly refused increasing his losartan last visit as he wants to avoid over medication  Advise continuing surveillance at home  3   Bicuspid aortic valve, without significant stenosis:  Will schedule echocardiogram    4  History of significant ascending aortic aneurysm, status post replacement with matthew arch reconstruction in 2014 (bicuspid valve not replaced at that time): Will continue pursuing adequate blood pressure control  Echocardiogram before next visit  5   Paroxysmal atrial fibrillation:  Postoperative in November 2014, transient, without any objective recurrence:  Normal rhythm on ECG today  6   Palpitations:  Suspect benign by history, Holter monitor reviewed from October 2017 at which time palpitations correlated with sinus rhythm  No new complaint on today's visit  7   Chronic/stable exertional dyspnea, atypical chest pain:  Suspect multifactorial, possibly due to weight and deconditioning  Chest pain is atypical, never with physical exertion  Will schedule nuclear stress test   He states he will not be able to walk on the treadmill due to balance issues  Pharmacologic nuclear stress test will be requested  ECG with nonspecific ST and T-wave abnormality, LVH  Follow-up in 6 months  Patient will call after completion of echocardiogram and stress test to discuss results over the phone  Will be referred to general surgery for protuberance in the abdomen, suggestive of hernia   -------------------------------------  Addendum 2/21/19:  Stress test and echo unremarkable, discussed w/ pt over phone  Bicuspid AV w/ Mild AS noted    Stress test with elevated baseline BP noted  Encouraged pt to check BP regularly at home and call me to update me regarding readings  He states his current machine doesn't work well and he was encouraged to purchase a new one and ask his pharmacist to help him pick out a good product  He is agreeable  Interval History: This is a very pleasant  61 YO male with a significant history of ascending aortic aneurysm, status post open replacement of the ascending aorta with matthew arch reconstruction on 11/06/2014  Preoperative cardiac catheterization was unremarkable  Postoperatively, he had transient atrial fibrillation, which converted to sinus rhythm on amiodarone/metoprolol, without any recurrence  Therefore he has not been maintained on anticoagulation while seeing Dr Eleonore Boas in the past   He also has a history of bicuspid aortic valve without significant stenosis, hypertension  He was last hospitalized August 2017 for numbness on his face, left arm, and left leg, with MRI brain which was unremarkable for stroke, without any evidence of atrial fibrillation on telemetry  MRI cervical spine 09/2017 was unremarkable  Echocardiogram on 8/27/17 revealed a normal ejection fraction at 60% with no evidence of aortic stenosis and no regurgitation  The valve leaflets were not well visualized  Prior Holter monitor October 2017 revealed no evidence of atrial fibrillation, only occasional PACs and PVCs  He was last seen September 2018 at which time he had complaints of atypical chest pain, dyspnea, and acid reflux symptoms  He refused a stress test at that time, and did not follow-up with gastroenterology  He presents today for follow-up with no change in his symptoms  Today, he denies exertional dyspnea, but continues to complain of chest discomfort which is across his chest, a feeling of pressure, which comes on quite randomly, never with physical exertion    He also admits to a protuberance between his umbilicus and epigastric area which increases with bearing down  He states he has been checking his blood pressures at home, and for the most part systolic pressures run less than 130 mm Hg        Vitals:  Vitals:    01/23/19 1041   BP: 142/92   Pulse: 60   Weight: 117 kg (258 lb)   Height: 5' 8" (1 727 m)         Past Medical History:   Diagnosis Date    [facility]     last assessed 11/20/17    Anxiety     currently on no meds    Cardiac disease     Cholelithiasis     Chronic kidney disease     GERD (gastroesophageal reflux disease)     Headache due to anesthesia     Hyperlipidemia     Hypertension     Irritable bowel syndrome     Kidney stone     Palpitations     PONV (postoperative nausea and vomiting)     Shortness of breath     ? related to acid reflux    Sleep apnea     not sure    Stroke ([facility] ) 11/2014    TIA (transient ischemic attack)      Social History     Social History    Marital status:      Spouse name: N/A    Number of children: N/A    Years of education: N/A     Occupational History    disabled        Social History Main Topics    Smoking status: Never Smoker    Smokeless tobacco: Never Used      Comment: no second hand smoke exposure    Alcohol use No    Drug use: No    Sexual activity: Not on file     Other Topics Concern    Not on file     Social History Narrative    Caffeine use    Completed some college     as per [employer]          Family History   Problem Relation Age of Onset    Diverticulitis Mother         of colon    Nephrolithiasis Mother     Emphysema Father     Nephrolithiasis Sister     Heart attack Maternal Grandfather 72    Breast cancer Paternal Grandmother     Lung cancer Paternal Grandfather     Cancer Family     Coronary artery disease Family     Diabetes Family         sibling    Hypertension Family         sibling    Hernia Family         sibling     Past Surgical History:   Procedure Laterality Date    AORTA SURGERY      thoracic - aneurysmorrhaphy    APPENDECTOMY      ASCENDING AORTIC ANEURYSM REPAIR      resolved 8/19/15    CHOLECYSTECTOMY      laparoscopic    CYSTOSCOPY      with removal of object- stent removal    KNEE ARTHROSCOPY Right 1996    with medial meniscus repair    LITHOTRIPSY      renal    NC FRAGMENT KIDNEY STONE/ ESWL Left 5/17/2018    Procedure: LITHROTRIPSY EXTRACORPORAL SHOCKWAVE (ESWL); Surgeon: Sonny Powers MD;  Location: [facility];  Service: Urology    THUMB ARTHROSCOPY Right 1976    ligament repair       Current Outpatient Prescriptions:     amoxicillin (AMOXIL) 500 mg capsule, TAKE 4 CAPSULES 1 HOUR PRIOR TO APPOINTMENT, Disp: , Rfl: 1    aspirin 325 mg tablet, Take 325 mg by mouth daily, Disp: , Rfl:     losartan (COZAAR) 25 mg tablet, TAKE 1 TABLET (25 MG TOTAL) BY MOUTH DAILY, Disp: 30 tablet, Rfl: 10    metoprolol tartrate (LOPRESSOR) 50 mg tablet, Take 1 tablet (50 mg total) by mouth 2 (two) times a day, Disp: 180 tablet, Rfl: 1    pantoprazole (PROTONIX) 40 mg tablet, Take 1 tablet (40 mg total) by mouth daily, Disp: 90 tablet, Rfl: 3    rosuvastatin (CRESTOR) 20 MG tablet, TAKE ONE TABLET BY MOUTH DAILY, Disp: 30 tablet, Rfl: 5    Labs:  No visits with results within 2 Month(s) from this visit  Latest known visit with results is:   Office Visit on 08/16/2018   Component Date Value    COLOR 08/16/2018 Pradip Anderson Size 08/16/2018 Comment     Stone Weight 08/16/2018 73 1     Composition 08/16/2018 Comment     Ca Oxalate,Dihydrate 08/16/2018 10     Ca Oxalate,Monohydr  08/16/2018 85     CALCIUM PHOSPHATE 08/16/2018 05     Nidus 08/16/2018 No Nidus visualized     STONE COMMENT 08/16/2018 Comment     Please note 08/16/2018 Comment     Disclaimer 08/16/2018 Comment     Photo 08/16/2018 Comment          Review of Systems:  Review of Systems   Respiratory: Negative for shortness of breath  Cardiovascular: Positive for chest pain   Negative for palpitations and leg swelling  Gastrointestinal:        +reflux symptoms  Abdominal hernia   Neurological: Negative for light-headedness and numbness  All other systems reviewed and are negative  Physical Exam:  Physical Exam   Constitutional: He is oriented to person, place, and time  He appears well-developed and well-nourished  No distress  HENT:   Head: Normocephalic and atraumatic  Eyes: Pupils are equal, round, and reactive to light  EOM are normal  Right eye exhibits no discharge  No scleral icterus  Neck: Normal range of motion  Neck supple  No thyromegaly present  Cardiovascular: Normal rate, regular rhythm and normal heart sounds  Exam reveals no gallop and no friction rub  No murmur heard  Pulmonary/Chest: Effort normal and breath sounds normal    Abdominal: He exhibits no distension  There is no tenderness  There is no rebound and no guarding    +soft mass protruding above umbilicus, s/o hernia   Musculoskeletal: Normal range of motion  He exhibits no edema  Neurological: He is alert and oriented to person, place, and time  Coordination normal    Skin: Skin is warm and dry  No rash noted  He is not diaphoretic  No erythema  No pallor  Psychiatric: He has a normal mood and affect   His behavior is normal  Judgment and thought content normal

## 2019-02-13 ENCOUNTER — TELEPHONE (OUTPATIENT)
Dept: NON INVASIVE DIAGNOSTICS | Facility: HOSPITAL | Age: 59
End: 2019-02-13

## 2019-02-15 ENCOUNTER — HOSPITAL ENCOUNTER (OUTPATIENT)
Dept: NUCLEAR MEDICINE | Facility: HOSPITAL | Age: 59
Discharge: HOME | End: 2019-02-15
Attending: INTERNAL MEDICINE
Payer: COMMERCIAL

## 2019-02-15 ENCOUNTER — HOSPITAL ENCOUNTER (OUTPATIENT)
Dept: NON INVASIVE DIAGNOSTICS | Facility: HOSPITAL | Age: 59
Discharge: HOME | End: 2019-02-15
Attending: INTERNAL MEDICINE
Payer: COMMERCIAL

## 2019-02-15 DIAGNOSIS — R07.89 ATYPICAL CHEST PAIN: ICD-10-CM

## 2019-02-15 DIAGNOSIS — K43.9 VENTRAL HERNIA WITHOUT OBSTRUCTION OR GANGRENE: ICD-10-CM

## 2019-02-15 LAB
CHEST PAIN STATEMENT: NORMAL
MAX DIASTOLIC BP: 112 MMHG
MAX HEART RATE: 129 BPM
MAX PREDICTED HEART RATE: 161 BPM
MAX. SYSTOLIC BP: 206 MMHG
PROTOCOL NAME: NORMAL
REASON FOR TERMINATION: NORMAL
TARGET HR FORMULA: NORMAL
TEST INDICATION: NORMAL
TIME IN EXERCISE PHASE: NORMAL

## 2019-02-15 PROCEDURE — 78452 HT MUSCLE IMAGE SPECT MULT: CPT | Performed by: INTERNAL MEDICINE

## 2019-02-15 PROCEDURE — 93306 TTE W/DOPPLER COMPLETE: CPT | Performed by: INTERNAL MEDICINE

## 2019-02-15 PROCEDURE — A9502 TC99M TETROFOSMIN: HCPCS

## 2019-02-15 PROCEDURE — 93306 TTE W/DOPPLER COMPLETE: CPT

## 2019-02-15 PROCEDURE — 93018 CV STRESS TEST I&R ONLY: CPT | Performed by: INTERNAL MEDICINE

## 2019-02-15 PROCEDURE — 93016 CV STRESS TEST SUPVJ ONLY: CPT | Performed by: INTERNAL MEDICINE

## 2019-02-15 PROCEDURE — 93017 CV STRESS TEST TRACING ONLY: CPT

## 2019-02-15 PROCEDURE — 78452 HT MUSCLE IMAGE SPECT MULT: CPT

## 2019-02-15 RX ADMIN — REGADENOSON 0.4 MG: 0.08 INJECTION, SOLUTION INTRAVENOUS at 13:56

## 2019-02-20 ENCOUNTER — TELEPHONE (OUTPATIENT)
Dept: CARDIOLOGY CLINIC | Facility: CLINIC | Age: 59
End: 2019-02-20

## 2019-02-23 DIAGNOSIS — K21.9 GASTROESOPHAGEAL REFLUX DISEASE WITHOUT ESOPHAGITIS: ICD-10-CM

## 2019-02-25 RX ORDER — PANTOPRAZOLE SODIUM 40 MG/1
40 TABLET, DELAYED RELEASE ORAL DAILY
Qty: 90 TABLET | Refills: 0 | Status: SHIPPED | OUTPATIENT
Start: 2019-02-25 | End: 2019-05-21 | Stop reason: SDUPTHER

## 2019-02-27 DIAGNOSIS — E78.2 MIXED HYPERLIPIDEMIA: ICD-10-CM

## 2019-02-27 RX ORDER — ROSUVASTATIN CALCIUM 20 MG/1
20 TABLET, COATED ORAL DAILY
Qty: 90 TABLET | Refills: 0 | Status: SHIPPED | OUTPATIENT
Start: 2019-02-27 | End: 2019-05-23 | Stop reason: SDUPTHER

## 2019-03-08 DIAGNOSIS — I25.810 ATHEROSCLEROSIS OF CORONARY ARTERY BYPASS GRAFT OF NATIVE HEART WITHOUT ANGINA PECTORIS: ICD-10-CM

## 2019-03-08 DIAGNOSIS — I10 ESSENTIAL HYPERTENSION: ICD-10-CM

## 2019-03-08 RX ORDER — LOSARTAN POTASSIUM 25 MG/1
25 TABLET ORAL DAILY
Qty: 90 TABLET | Refills: 1 | Status: SHIPPED | OUTPATIENT
Start: 2019-03-08 | End: 2019-06-26 | Stop reason: SDUPTHER

## 2019-05-21 DIAGNOSIS — K21.9 GASTROESOPHAGEAL REFLUX DISEASE WITHOUT ESOPHAGITIS: ICD-10-CM

## 2019-05-21 RX ORDER — PANTOPRAZOLE SODIUM 40 MG/1
40 TABLET, DELAYED RELEASE ORAL DAILY
Qty: 90 TABLET | Refills: 0 | Status: SHIPPED | OUTPATIENT
Start: 2019-05-21 | End: 2019-08-21 | Stop reason: SDUPTHER

## 2019-05-23 DIAGNOSIS — E78.2 MIXED HYPERLIPIDEMIA: ICD-10-CM

## 2019-05-23 RX ORDER — ROSUVASTATIN CALCIUM 20 MG/1
TABLET, COATED ORAL
Qty: 30 TABLET | Refills: 0 | Status: SHIPPED | OUTPATIENT
Start: 2019-05-23 | End: 2019-06-26 | Stop reason: SDUPTHER

## 2019-05-24 DIAGNOSIS — I71.2 THORACIC AORTIC ANEURYSM WITHOUT RUPTURE (HCC): Primary | ICD-10-CM

## 2019-06-03 DIAGNOSIS — I10 HTN (HYPERTENSION), BENIGN: ICD-10-CM

## 2019-06-03 RX ORDER — METOPROLOL TARTRATE 50 MG/1
TABLET, FILM COATED ORAL
Qty: 180 TABLET | Refills: 1 | Status: SHIPPED | OUTPATIENT
Start: 2019-06-03 | End: 2019-11-27 | Stop reason: SDUPTHER

## 2019-06-15 ENCOUNTER — HOSPITAL ENCOUNTER (OUTPATIENT)
Dept: CT IMAGING | Facility: HOSPITAL | Age: 59
Discharge: HOME | End: 2019-06-15
Attending: THORACIC SURGERY (CARDIOTHORACIC VASCULAR SURGERY)
Payer: COMMERCIAL

## 2019-06-15 DIAGNOSIS — I71.2 THORACIC AORTIC ANEURYSM WITHOUT RUPTURE (HCC): ICD-10-CM

## 2019-06-15 PROCEDURE — 71250 CT THORAX DX C-: CPT

## 2019-06-21 ENCOUNTER — OFFICE VISIT (OUTPATIENT)
Dept: CARDIAC SURGERY | Facility: CLINIC | Age: 59
End: 2019-06-21
Payer: COMMERCIAL

## 2019-06-21 VITALS
OXYGEN SATURATION: 97 % | SYSTOLIC BLOOD PRESSURE: 142 MMHG | BODY MASS INDEX: 38.49 KG/M2 | TEMPERATURE: 97.4 F | WEIGHT: 254 LBS | HEIGHT: 68 IN | DIASTOLIC BLOOD PRESSURE: 78 MMHG | HEART RATE: 59 BPM | RESPIRATION RATE: 12 BRPM

## 2019-06-21 DIAGNOSIS — I71.2 THORACIC AORTIC ANEURYSM WITHOUT RUPTURE (HCC): Primary | ICD-10-CM

## 2019-06-21 PROCEDURE — 99213 OFFICE O/P EST LOW 20 MIN: CPT | Performed by: PHYSICIAN ASSISTANT

## 2019-06-26 ENCOUNTER — OFFICE VISIT (OUTPATIENT)
Dept: FAMILY MEDICINE CLINIC | Facility: CLINIC | Age: 59
End: 2019-06-26
Payer: COMMERCIAL

## 2019-06-26 VITALS
BODY MASS INDEX: 37.77 KG/M2 | SYSTOLIC BLOOD PRESSURE: 148 MMHG | WEIGHT: 249.2 LBS | HEIGHT: 68 IN | HEART RATE: 64 BPM | DIASTOLIC BLOOD PRESSURE: 92 MMHG

## 2019-06-26 DIAGNOSIS — I48.0 PAROXYSMAL ATRIAL FIBRILLATION (HCC): ICD-10-CM

## 2019-06-26 DIAGNOSIS — R07.89 CHEST PAIN, ATYPICAL: ICD-10-CM

## 2019-06-26 DIAGNOSIS — E78.2 MIXED HYPERLIPIDEMIA: ICD-10-CM

## 2019-06-26 DIAGNOSIS — R53.81 PHYSICAL DECONDITIONING: ICD-10-CM

## 2019-06-26 DIAGNOSIS — G45.9 TIA (TRANSIENT ISCHEMIC ATTACK): ICD-10-CM

## 2019-06-26 DIAGNOSIS — E66.01 CLASS 2 SEVERE OBESITY DUE TO EXCESS CALORIES WITH SERIOUS COMORBIDITY AND BODY MASS INDEX (BMI) OF 37.0 TO 37.9 IN ADULT (HCC): ICD-10-CM

## 2019-06-26 DIAGNOSIS — B07.8 OTHER VIRAL WARTS: ICD-10-CM

## 2019-06-26 DIAGNOSIS — K21.9 GASTROESOPHAGEAL REFLUX DISEASE WITHOUT ESOPHAGITIS: ICD-10-CM

## 2019-06-26 DIAGNOSIS — I25.810 ATHEROSCLEROSIS OF CORONARY ARTERY BYPASS GRAFT OF NATIVE HEART WITHOUT ANGINA PECTORIS: Primary | ICD-10-CM

## 2019-06-26 DIAGNOSIS — I10 ESSENTIAL HYPERTENSION: ICD-10-CM

## 2019-06-26 DIAGNOSIS — E55.9 VITAMIN D DEFICIENCY: ICD-10-CM

## 2019-06-26 PROBLEM — Z12.11 SCREENING FOR COLON CANCER: Status: RESOLVED | Noted: 2018-05-02 | Resolved: 2019-06-26

## 2019-06-26 PROBLEM — E66.9 OBESITY: Status: ACTIVE | Noted: 2018-05-02

## 2019-06-26 PROBLEM — E78.5 HYPERLIPIDEMIA: Status: ACTIVE | Noted: 2017-03-17

## 2019-06-26 PROCEDURE — 3008F BODY MASS INDEX DOCD: CPT | Performed by: FAMILY MEDICINE

## 2019-06-26 PROCEDURE — 99214 OFFICE O/P EST MOD 30 MIN: CPT | Performed by: FAMILY MEDICINE

## 2019-06-26 PROCEDURE — G0439 PPPS, SUBSEQ VISIT: HCPCS | Performed by: FAMILY MEDICINE

## 2019-06-26 PROCEDURE — 1036F TOBACCO NON-USER: CPT | Performed by: FAMILY MEDICINE

## 2019-06-26 RX ORDER — FAMOTIDINE 40 MG/1
40 TABLET, FILM COATED ORAL DAILY
Qty: 90 TABLET | Refills: 1 | Status: SHIPPED | OUTPATIENT
Start: 2019-06-26 | End: 2019-08-08

## 2019-06-26 RX ORDER — ROSUVASTATIN CALCIUM 20 MG/1
20 TABLET, COATED ORAL DAILY
Qty: 90 TABLET | Refills: 1 | Status: SHIPPED | OUTPATIENT
Start: 2019-06-26 | End: 2019-12-17 | Stop reason: SDUPTHER

## 2019-06-26 RX ORDER — LOSARTAN POTASSIUM 25 MG/1
50 TABLET ORAL DAILY
Qty: 90 TABLET | Refills: 1 | Status: SHIPPED | OUTPATIENT
Start: 2019-06-26 | End: 2019-08-08

## 2019-07-24 ENCOUNTER — OFFICE VISIT (OUTPATIENT)
Dept: CARDIOLOGY CLINIC | Facility: CLINIC | Age: 59
End: 2019-07-24
Payer: COMMERCIAL

## 2019-07-24 VITALS
HEART RATE: 58 BPM | BODY MASS INDEX: 43.95 KG/M2 | SYSTOLIC BLOOD PRESSURE: 130 MMHG | HEIGHT: 68 IN | DIASTOLIC BLOOD PRESSURE: 70 MMHG | WEIGHT: 290 LBS | OXYGEN SATURATION: 95 %

## 2019-07-24 DIAGNOSIS — I48.21 PERMANENT ATRIAL FIBRILLATION (HCC): ICD-10-CM

## 2019-07-24 DIAGNOSIS — E78.5 DYSLIPIDEMIA: ICD-10-CM

## 2019-07-24 DIAGNOSIS — I10 BENIGN ESSENTIAL HTN: Primary | ICD-10-CM

## 2019-07-24 DIAGNOSIS — I71.2 THORACIC AORTIC ANEURYSM WITHOUT RUPTURE (HCC): ICD-10-CM

## 2019-07-24 PROCEDURE — 99214 OFFICE O/P EST MOD 30 MIN: CPT | Performed by: INTERNAL MEDICINE

## 2019-07-24 PROCEDURE — 3075F SYST BP GE 130 - 139MM HG: CPT | Performed by: INTERNAL MEDICINE

## 2019-07-24 NOTE — PROGRESS NOTES
Cardiology Follow Up        Enoch Reed      1960      [id number]      Discussion/Summary:    1  Benign essential hypertension  2  Bicuspid aortic valve with mild aortic valve stenosis  3  History of significant ascending aortic aneurysm status post replacement with matthew arch construction in 2014 (bicuspid valve number placed at that time)  4  Paroxysmal atrial fibrillation  5  Chronic atypical chest pain    · Agree with augmentation of losartan as done by PCP last month  Advised low-sodium diet, weight loss, exercise  Patient planning starting exercise regimen on stationary bicycle  · Prior echocardiogram reviewed from 02/2018, mild aortic valve stenosis  Will repeat in 2 years  · Regular rhythm by physical examination, AFib was only postoperative in 2014, transient, without objective recurrence  As such, he has been off anticoagulation    Follow-up in 6 months      Interval History: This is a very pleasant  63 YO male with a significant history of ascending aortic aneurysm, status post open replacement of the ascending aorta with matthew arch reconstruction on 11/06/2014  Preoperative cardiac catheterization was unremarkable  Postoperatively, he had transient atrial fibrillation, which converted to sinus rhythm on amiodarone/metoprolol, without any recurrence  Therefore he has not been maintained on anticoagulation while seeing Dr Ana Perales in the past   He also has a history of bicuspid aortic valve without significant stenosis, hypertension      He was last hospitalized August 2017 for numbness on his face, left arm, and left leg, with MRI brain which was unremarkable for stroke, without any evidence of atrial fibrillation on telemetry  MRI cervical spine 09/2017 was unremarkable  Echocardiogram on 8/27/17 revealed a normal ejection fraction at 60% with no evidence of aortic stenosis and no regurgitation    The valve leaflets were not well visualized      Prior Holter monitor October 2017 revealed no evidence of atrial fibrillation, only occasional PACs and PVCs      During his prior visit 01/2019 he had complaints of atypical chest pain  Thereafter he underwent a nuclear stress test 02/2018 which was unremarkable  Echocardiogram revealed mild aortic stenosis  His losartan was increased to 50 mg daily by his PCP prior visit 06/26/2019  He presents today for follow-up with no complaints  He does admit to feeling somewhat imbalanced at times, which has been chronic  He denies chest pain, shortness of breath, lower extremity edema             Vitals:  Vitals:    07/24/19 1035   BP: 130/70   BP Location: Right arm   Patient Position: Sitting   Cuff Size: Adult   Pulse: 58   SpO2: 95%   Weight: 132 kg (290 lb)   Height: 5' 8" (1 727 m)         Past Medical History:   Diagnosis Date    Aneurysm of thoracic aorta ([address] )     last assessed 11/20/17    Anxiety     currently on no meds    Cardiac disease     Cholelithiasis     Chronic kidney disease     GERD (gastroesophageal reflux disease)     Headache due to anesthesia     Hyperlipidemia     Hypertension     Irritable bowel syndrome     Kidney stone     Palpitations     PONV (postoperative nausea and vomiting)     Shortness of breath     ? related to acid reflux    Sleep apnea     not sure    Stroke [facility]) 11/2014    TIA (transient ischemic attack)      Social History     Socioeconomic History    Marital status:      Spouse name: Not on file    Number of children: Not on file    Years of education: Not on file    Highest education level: Not on file   Occupational History    Occupation: disabled     Social Needs    Financial resource strain: Not on file    Food insecurity:     Worry: Not on file     Inability: Not on file    Transportation needs:     Medical: Not on file     Non-medical: Not on file   Tobacco Use    Smoking status: Never Smoker    Smokeless tobacco: Never Used    Tobacco comment: no second hand smoke exposure   Substance and Sexual Activity    Alcohol use: No    Drug use: No    Sexual activity: Not on file   Lifestyle    Physical activity:     Days per week: Not on file     Minutes per session: Not on file    Stress: Not on file   Relationships    Social connections:     Talks on phone: Not on file     Gets together: Not on file     Attends Yarsanism service: Not on file     Active member of club or organization: Not on file     Attends meetings of clubs or organizations: Not on file     Relationship status: Not on file    Intimate partner violence:     Fear of current or ex partner: Not on file     Emotionally abused: Not on file     Physically abused: Not on file     Forced sexual activity: Not on file   Other Topics Concern    Not on file   Social History Narrative    Caffeine use    Completed some college     as per [employer]      Family History   Problem Relation Age of Onset    Diverticulitis Mother         of colon    Nephrolithiasis Mother     Emphysema Father     Nephrolithiasis Sister     Heart attack Maternal Grandfather 72    Breast cancer Paternal Grandmother     Lung cancer Paternal Grandfather     Cancer Family     Coronary artery disease Family     Diabetes Family         sibling    Hypertension Family         sibling    Hernia Family         sibling     Past Surgical History:   Procedure Laterality Date    AORTA SURGERY      thoracic - aneurysmorrhaphy    APPENDECTOMY      ASCENDING AORTIC ANEURYSM REPAIR      resolved 8/19/15    CHOLECYSTECTOMY      laparoscopic    CYSTOSCOPY      with removal of object- stent removal    KNEE ARTHROSCOPY Right 1996    with medial meniscus repair    LITHOTRIPSY      renal    [place] FRAGMENT KIDNEY STONE/ ESWL Left 5/17/2018    Procedure: LITHROTRIPSY EXTRACORPORAL SHOCKWAVE (ESWL);   Surgeon: Helio Muñoz MD;  Location[facility];  Service: Urology    THUMB ARTHROSCOPY Right 1976    ligament repair       Current Outpatient Medications:     amoxicillin (AMOXIL) 500 mg capsule, TAKE 4 CAPSULES 1 HOUR PRIOR TO APPOINTMENT, Disp: , Rfl: 1    aspirin 325 mg tablet, Take 325 mg by mouth daily, Disp: , Rfl:     losartan (COZAAR) 25 mg tablet, Take 2 tablets (50 mg total) by mouth daily for 90 days, Disp: 90 tablet, Rfl: 1    metoprolol tartrate (LOPRESSOR) 50 mg tablet, TAKE 1 TABLET BY MOUTH TWICE A DAY, Disp: 180 tablet, Rfl: 1    pantoprazole (PROTONIX) 40 mg tablet, TAKE 1 TABLET (40 MG TOTAL) BY MOUTH DAILY, Disp: 90 tablet, Rfl: 0    rosuvastatin (CRESTOR) 20 MG tablet, Take 1 tablet (20 mg total) by mouth daily, Disp: 90 tablet, Rfl: 1    famotidine (PEPCID) 40 MG tablet, Take 1 tablet (40 mg total) by mouth daily for 180 days (Patient not taking: Reported on 7/24/2019), Disp: 90 tablet, Rfl: 1        Review of Systems:  Review of Systems      Physical Exam:  Physical Exam    This note was completed in part utilizing [employer] Software  Grammatical errors, random word insertions, spelling mistakes, and incomplete sentences can be an occasional consequence of this system secondary to software limitations, ambient noise, and hardware issues  If you have any questions or concerns about the content, text, or information contained within the body of this dictation, please contact the provider for clarification

## 2019-08-01 ENCOUNTER — HOSPITAL ENCOUNTER (OUTPATIENT)
Dept: ULTRASOUND IMAGING | Facility: MEDICAL CENTER | Age: 59
Discharge: HOME | End: 2019-08-01
Payer: COMMERCIAL

## 2019-08-01 DIAGNOSIS — N20.0 KIDNEY STONES: ICD-10-CM

## 2019-08-01 PROCEDURE — 76770 US EXAM ABDO BACK WALL COMP: CPT

## 2019-08-08 ENCOUNTER — OFFICE VISIT (OUTPATIENT)
Dept: FAMILY MEDICINE CLINIC | Facility: CLINIC | Age: 59
End: 2019-08-08
Payer: COMMERCIAL

## 2019-08-08 VITALS
HEIGHT: 68 IN | HEART RATE: 88 BPM | SYSTOLIC BLOOD PRESSURE: 140 MMHG | BODY MASS INDEX: 37.74 KG/M2 | TEMPERATURE: 99.6 F | DIASTOLIC BLOOD PRESSURE: 94 MMHG | WEIGHT: 249 LBS

## 2019-08-08 DIAGNOSIS — I48.21 PERMANENT ATRIAL FIBRILLATION (HCC): ICD-10-CM

## 2019-08-08 DIAGNOSIS — I10 ESSENTIAL HYPERTENSION: ICD-10-CM

## 2019-08-08 DIAGNOSIS — T78.3XXA ANGIOEDEMA, INITIAL ENCOUNTER: Primary | ICD-10-CM

## 2019-08-08 PROCEDURE — 99214 OFFICE O/P EST MOD 30 MIN: CPT | Performed by: PHYSICIAN ASSISTANT

## 2019-08-08 PROCEDURE — 3008F BODY MASS INDEX DOCD: CPT | Performed by: PHYSICIAN ASSISTANT

## 2019-08-08 RX ORDER — PREDNISONE 10 MG/1
TABLET ORAL
Qty: 21 TABLET | Refills: 0 | Status: SHIPPED | OUTPATIENT
Start: 2019-08-08 | End: 2019-08-14

## 2019-08-08 RX ORDER — AMLODIPINE BESYLATE 5 MG/1
5 TABLET ORAL DAILY
Qty: 30 TABLET | Refills: 5 | Status: SHIPPED | OUTPATIENT
Start: 2019-08-08 | End: 2020-01-31

## 2019-08-08 NOTE — PROGRESS NOTES
Assessment and Plan:  Patient Instructions     1  Angioedema-likely related to losartan  Would recommend stopping medication completely  Since he is still having significant hives and itching he will be placed on Prednisone 10 mg, take 6 tablets at once on day 1, then 5 tablets at once on day 2, then 4 tablets at once on day 3, then 3 tablets at once on day 4, then 2 tablets once on day 5, then 1 tablet on day 6, 21 pills/0Rfls  2   Benign essential hypertension -not at ideal goal and patient will be discontinuing losartan  I would recommend he start amlodipine 5 mg daily in about 1 week  I would like to give him some time to get this reaction out of his system and continue the prednisone, but after he is feeling better he should start the new medication  3  Atrial fibrillation -presently stable on beta-blocker therapy  Continues follow-up with   Cardiology  Diagnoses and all orders for this visit:    Angioedema, initial encounter  -     predniSONE 10 mg tablet; 6,5,4,3,2,1    Essential hypertension  -     amLODIPine (NORVASC) 5 mg tablet; Take 1 tablet (5 mg total) by mouth daily    Permanent atrial fibrillation (HCC)              Subjective:      Patient ID: Sal Hendricks is a 61 y o  male  CC:    Chief Complaint   Patient presents with    Rash     Started Monday with itchy rash on neck  It has now spread all over body but the worst is in neck, groin, sides and scalp  He is not sure if it is a medication reaction  Or if it caused from sleeping in a new clients bed  He sits dogs  mjs       HPI:      HPI:  This is a 68-year-old gentleman that presents to the office with concerns over possible medication reaction  About a month ago his losartan had been increased from 25 mg to 50 mg  Just the other day he had some swelling of his right lip and tingling  He has also developed red hives all across his chest, back, arms, and thighs    He has been having a lot of itching including in his scalp  He was concerned with possible contact to bed sheets as he is also a  and had been sleeping in some different linens  However he does have significant hives in the areas where he is covered while sleeping  His blood pressure has been better controlled since increasing losartan to 50 mg but he is concerned this may be a side effect  Patient has discontinued the losartan yesterday and has not had the lip swelling since  The following portions of the patient's history were reviewed and updated as appropriate: allergies, current medications, past family history, past medical history, past social history, past surgical history and problem list       Review of Systems   Constitutional: Negative for fever  HENT: Negative for congestion  Respiratory: Negative for cough, chest tightness and shortness of breath  Cardiovascular: Negative for chest pain  Musculoskeletal: Negative for arthralgias  Skin: Positive for rash  Itching         Data to review:       Objective:    Vitals:    08/08/19 0856   BP: 140/94   Pulse: 88   Temp: 99 6 °F (37 6 °C)   Weight: 113 kg (249 lb)   Height: 5' 8" (1 727 m)        Physical Exam   Constitutional: He is oriented to person, place, and time  He appears well-developed and well-nourished  HENT:   Head: Normocephalic  No current edema of the lip or tongue visible  Cardiovascular: Normal rate and regular rhythm  Pulmonary/Chest: Effort normal and breath sounds normal  No respiratory distress  Abdominal: Soft  Musculoskeletal: Normal range of motion  Neurological: He is alert and oriented to person, place, and time  Skin:    Widespread elevated hives of the body ranging in size from 1 cm to 10 cm with mild central clearing  Psychiatric: He has a normal mood and affect

## 2019-08-08 NOTE — PATIENT INSTRUCTIONS
1  Angioedema-likely related to losartan  Would recommend stopping medication completely  Since he is still having significant hives and itching he will be placed on Prednisone 10 mg, take 6 tablets at once on day 1, then 5 tablets at once on day 2, then 4 tablets at once on day 3, then 3 tablets at once on day 4, then 2 tablets once on day 5, then 1 tablet on day 6, 21 pills/0Rfls  2   Benign essential hypertension -not at ideal goal and patient will be discontinuing losartan  I would recommend he start amlodipine 5 mg daily in about 1 week  I would like to give him some time to get this reaction out of his system and continue the prednisone, but after he is feeling better he should start the new medication  3  Atrial fibrillation -presently stable on beta-blocker therapy  Continues follow-up with   Cardiology

## 2019-08-13 ENCOUNTER — HOSPITAL ENCOUNTER (OUTPATIENT)
Dept: RADIOLOGY | Facility: HOSPITAL | Age: 59
Discharge: HOME | End: 2019-08-13
Payer: COMMERCIAL

## 2019-08-13 DIAGNOSIS — N20.0 KIDNEY STONES: ICD-10-CM

## 2019-08-13 PROCEDURE — 74018 RADEX ABDOMEN 1 VIEW: CPT

## 2019-08-15 ENCOUNTER — APPOINTMENT (OUTPATIENT)
Dept: LAB | Facility: HOSPITAL | Age: 59
End: 2019-08-15
Payer: COMMERCIAL

## 2019-08-15 DIAGNOSIS — Z12.5 PROSTATE CANCER SCREENING: ICD-10-CM

## 2019-08-15 DIAGNOSIS — Z12.5 PROSTATE CANCER SCREENING: Primary | ICD-10-CM

## 2019-08-15 LAB — PSA SERPL-MCNC: 0.8 NG/ML (ref 0–4)

## 2019-08-15 PROCEDURE — G0103 PSA SCREENING: HCPCS

## 2019-08-15 PROCEDURE — 36415 COLL VENOUS BLD VENIPUNCTURE: CPT

## 2019-08-16 ENCOUNTER — OFFICE VISIT (OUTPATIENT)
Dept: UROLOGY | Facility: CLINIC | Age: 59
End: 2019-08-16
Payer: COMMERCIAL

## 2019-08-16 VITALS
BODY MASS INDEX: 37.31 KG/M2 | HEART RATE: 64 BPM | HEIGHT: 68 IN | SYSTOLIC BLOOD PRESSURE: 150 MMHG | WEIGHT: 246.2 LBS | DIASTOLIC BLOOD PRESSURE: 90 MMHG

## 2019-08-16 DIAGNOSIS — N20.0 KIDNEY STONES: Primary | ICD-10-CM

## 2019-08-16 DIAGNOSIS — Z12.5 PROSTATE CANCER SCREENING: ICD-10-CM

## 2019-08-16 PROCEDURE — 99213 OFFICE O/P EST LOW 20 MIN: CPT | Performed by: PHYSICIAN ASSISTANT

## 2019-08-21 DIAGNOSIS — K21.9 GASTROESOPHAGEAL REFLUX DISEASE WITHOUT ESOPHAGITIS: ICD-10-CM

## 2019-08-21 RX ORDER — PANTOPRAZOLE SODIUM 40 MG/1
40 TABLET, DELAYED RELEASE ORAL DAILY
Qty: 90 TABLET | Refills: 0 | Status: SHIPPED | OUTPATIENT
Start: 2019-08-21 | End: 2019-11-13 | Stop reason: SDUPTHER

## 2019-09-20 DIAGNOSIS — I25.810 ATHEROSCLEROSIS OF CORONARY ARTERY BYPASS GRAFT OF NATIVE HEART WITHOUT ANGINA PECTORIS: ICD-10-CM

## 2019-09-20 DIAGNOSIS — I10 ESSENTIAL HYPERTENSION: ICD-10-CM

## 2019-09-20 RX ORDER — LOSARTAN POTASSIUM 25 MG/1
50 TABLET ORAL DAILY
Qty: 180 TABLET | Refills: 0 | OUTPATIENT
Start: 2019-09-20 | End: 2019-12-19

## 2019-09-20 NOTE — TELEPHONE ENCOUNTER
Based on angioedema at office visit, Elias discontinue the losartan  Patient needs to have office visit for further re-evaluation of blood pressure, and I would not start any medications until I see him at office visit

## 2019-11-13 DIAGNOSIS — K21.9 GASTROESOPHAGEAL REFLUX DISEASE WITHOUT ESOPHAGITIS: ICD-10-CM

## 2019-11-13 RX ORDER — PANTOPRAZOLE SODIUM 40 MG/1
40 TABLET, DELAYED RELEASE ORAL DAILY
Qty: 90 TABLET | Refills: 0 | Status: SHIPPED | OUTPATIENT
Start: 2019-11-13 | End: 2020-02-07

## 2019-11-27 DIAGNOSIS — I10 HTN (HYPERTENSION), BENIGN: ICD-10-CM

## 2019-11-27 RX ORDER — METOPROLOL TARTRATE 50 MG/1
TABLET, FILM COATED ORAL
Qty: 180 TABLET | Refills: 1 | Status: SHIPPED | OUTPATIENT
Start: 2019-11-27 | End: 2019-12-26 | Stop reason: ALTCHOICE

## 2019-12-17 DIAGNOSIS — E78.2 MIXED HYPERLIPIDEMIA: ICD-10-CM

## 2019-12-17 RX ORDER — ROSUVASTATIN CALCIUM 20 MG/1
TABLET, COATED ORAL
Qty: 90 TABLET | Refills: 1 | Status: SHIPPED | OUTPATIENT
Start: 2019-12-17 | End: 2020-03-27 | Stop reason: HOSPADM

## 2019-12-26 ENCOUNTER — OFFICE VISIT (OUTPATIENT)
Dept: FAMILY MEDICINE CLINIC | Facility: CLINIC | Age: 59
End: 2019-12-26
Payer: COMMERCIAL

## 2019-12-26 VITALS
BODY MASS INDEX: 38.19 KG/M2 | HEART RATE: 68 BPM | WEIGHT: 252 LBS | HEIGHT: 68 IN | DIASTOLIC BLOOD PRESSURE: 78 MMHG | SYSTOLIC BLOOD PRESSURE: 150 MMHG

## 2019-12-26 DIAGNOSIS — G45.9 TIA (TRANSIENT ISCHEMIC ATTACK): ICD-10-CM

## 2019-12-26 DIAGNOSIS — Z86.79 H/O AORTIC ANEURYSM REPAIR: ICD-10-CM

## 2019-12-26 DIAGNOSIS — I10 ESSENTIAL HYPERTENSION: ICD-10-CM

## 2019-12-26 DIAGNOSIS — E66.01 CLASS 2 SEVERE OBESITY DUE TO EXCESS CALORIES WITH SERIOUS COMORBIDITY AND BODY MASS INDEX (BMI) OF 38.0 TO 38.9 IN ADULT (HCC): ICD-10-CM

## 2019-12-26 DIAGNOSIS — R07.89 CHEST PAIN, ATYPICAL: ICD-10-CM

## 2019-12-26 DIAGNOSIS — I48.0 PAROXYSMAL ATRIAL FIBRILLATION (HCC): Primary | ICD-10-CM

## 2019-12-26 DIAGNOSIS — E78.2 MIXED HYPERLIPIDEMIA: ICD-10-CM

## 2019-12-26 DIAGNOSIS — Z98.890 H/O AORTIC ANEURYSM REPAIR: ICD-10-CM

## 2019-12-26 PROCEDURE — 3008F BODY MASS INDEX DOCD: CPT | Performed by: FAMILY MEDICINE

## 2019-12-26 PROCEDURE — 99214 OFFICE O/P EST MOD 30 MIN: CPT | Performed by: FAMILY MEDICINE

## 2019-12-26 PROCEDURE — 1036F TOBACCO NON-USER: CPT | Performed by: FAMILY MEDICINE

## 2019-12-26 RX ORDER — METOPROLOL SUCCINATE 50 MG/1
150 TABLET, EXTENDED RELEASE ORAL DAILY
Qty: 270 TABLET | Refills: 1 | Status: SHIPPED | OUTPATIENT
Start: 2019-12-26 | End: 2020-01-31 | Stop reason: SDUPTHER

## 2019-12-26 NOTE — ASSESSMENT & PLAN NOTE
Patient does continue to have some chest pain at times  He did follow with Cardiology  No changes for now

## 2019-12-26 NOTE — ASSESSMENT & PLAN NOTE
TIA was noted in the hospital previously, but the patient reports that he continues to have some side effects from that  It took him approximately 2 years to improve after the initial episode post surgical   Left upper extremity is weaker today  With the most recent episode 2-3 weeks ago, he has had problems remembering since then, though it seems to be more back to baseline since then  This does seem to represent persistent changes after the initial episode with his heart surgery  That does suggest stroke rather than TIA  Recommend MRI  Follow-up afterwards  Also would consider recommending anticoagulation with Xarelto versus Pradaxa verses Eliquis

## 2019-12-26 NOTE — PROGRESS NOTES
Assessment and Plan:    Problem List Items Addressed This Visit     Atrial fibrillation [facility]) - Primary     Patient does have history of atrial fibrillation, but Cardiology felt this was only related to the heart surgery before  He has not had confirmed episodes after, but the description of the patient is that he has had some odd symptoms at times, and now with the repeat TIA versus CVA, I feel he should probably start anticoagulation  Recommend Xarelto secondary to once a day at patient request   We did review the other medications as well  Follow with Cardiology in the future, but I do feel this is most important  Relevant Medications    metoprolol succinate (TOPROL-XL) 50 mg 24 hr tablet    rivaroxaban (XARELTO) 20 mg tablet    Other Relevant Orders    Ambulatory referral to Cardiology    Chest pain, atypical     Patient does continue to have some chest pain at times  He did follow with Cardiology  No changes for now  Relevant Orders    Ambulatory referral to Cardiology    H/O aortic aneurysm repair     Stable at the moment  No current symptoms  He did have a slight murmur  Relevant Orders    Ambulatory referral to Cardiology    Hyperlipidemia     Patient is overdue for labs  Recommend check, and follow up after that  He has been on Crestor 20 mg  Hypertension     Blood pressure is slightly elevated today  I would recommend that we consider increasing medication, i e  10 mg Norvasc  He did mention that he has some marks on his lower extremities when he removed his socks, so perhaps we should increase metoprolol from 50 twice a day  Change to metoprolol succinate 50 mg, 3 daily  Relevant Medications    metoprolol succinate (TOPROL-XL) 50 mg 24 hr tablet    Other Relevant Orders    Ambulatory referral to Cardiology    Obesity     BMI is elevated versus before  Limit carbohydrates, increase exercise           TIA (transient ischemic attack)     TIA was noted in the hospital previously, but the patient reports that he continues to have some side effects from that  It took him approximately 2 years to improve after the initial episode post surgical   Left upper extremity is weaker today  With the most recent episode 2-3 weeks ago, he has had problems remembering since then, though it seems to be more back to baseline since then  This does seem to represent persistent changes after the initial episode with his heart surgery  That does suggest stroke rather than TIA  Recommend MRI  Follow-up afterwards  Also would consider recommending anticoagulation with Xarelto versus Pradaxa verses Eliquis  Relevant Orders    MRI brain wo contrast                 Diagnoses and all orders for this visit:    Paroxysmal atrial fibrillation (HCC)  -     rivaroxaban (XARELTO) 20 mg tablet; Take 1 tablet (20 mg total) by mouth daily with breakfast  -     Ambulatory referral to Cardiology; Future    Mixed hyperlipidemia    Essential hypertension  -     metoprolol succinate (TOPROL-XL) 50 mg 24 hr tablet; Take 3 tablets (150 mg total) by mouth daily  -     Ambulatory referral to Cardiology; Future    TIA (transient ischemic attack)  -     MRI brain wo contrast; Future    Chest pain, atypical  -     Ambulatory referral to Cardiology; Future    H/O aortic aneurysm repair  -     Ambulatory referral to Cardiology; Future    Class 2 severe obesity due to excess calories with serious comorbidity and body mass index (BMI) of 38 0 to 38 9 in adult [facility])              Subjective:      Patient ID: Alexis Guy is a 61 y o  male  CC:    Chief Complaint   Patient presents with    Follow-up     6 month follow up to HTN, meds  mjs       HPI:    Patient is here to follow-up on multiple issues  Hypertension:  Patient did have an angioedema episode with using losartan  Since then, he has gone on to amlodipine  Patient is on metoprolol tartrate as well, using it twice a day      A-Fib: He still gets some chest pains at times  He did have an episode similar to CVA before  It seemed to resolve, but has problems  He is not on Anticoag  TIA: Has had some recurrence  Weight: Still up  He notes some deconditioning  Has had some problems with extremities falling asleep soon  The following portions of the patient's history were reviewed and updated as appropriate: allergies, current medications, past family history, past medical history, past social history, past surgical history and problem list       Review of Systems   Constitutional: Negative  HENT: Negative  Eyes: Negative  Respiratory: Negative  Cardiovascular: Positive for palpitations  Negative for leg swelling  Gastrointestinal: Negative  Endocrine: Negative  Genitourinary: Negative  Musculoskeletal: Negative  Skin: Negative  Allergic/Immunologic: Negative  Neurological: Positive for speech difficulty and weakness  Per HPI   Hematological: Negative  Psychiatric/Behavioral: Negative  Data to review:       Objective:    Vitals:    12/26/19 0845   BP: 150/78   Pulse: 68   Weight: 114 kg (252 lb)   Height: 5' 8" (1 727 m)        Physical Exam   Constitutional: He is oriented to person, place, and time  He appears well-developed and well-nourished  HENT:   Head: Normocephalic and atraumatic  Neck: Normal range of motion  Neck supple  Cardiovascular: Normal rate, regular rhythm and normal heart sounds  Exam reveals no gallop and no friction rub  No murmur heard  Pulses:       Carotid pulses are 2+ on the right side, and 2+ on the left side  Pulmonary/Chest: Effort normal and breath sounds normal  No respiratory distress  He has no wheezes  He has no rales  Neurological: He is alert and oriented to person, place, and time  He displays normal reflexes  No cranial nerve deficit   He exhibits abnormal muscle tone (left upper extremity weak, decreased  and flex/extension  )  Coordination normal    Nursing note and vitals reviewed  BMI Counseling: Body mass index is 38 32 kg/m²  The BMI is above normal  Nutrition recommendations include decreasing portion sizes, encouraging healthy choices of fruits and vegetables, decreasing fast food intake, consuming healthier snacks, limiting drinks that contain sugar, moderation in carbohydrate intake, increasing intake of lean protein, reducing intake of saturated and trans fat and reducing intake of cholesterol  Exercise recommendations include exercising 3-5 times per week  No pharmacotherapy was ordered

## 2019-12-26 NOTE — PATIENT INSTRUCTIONS
Problem List Items Addressed This Visit     Atrial fibrillation [facility]) - Primary     Patient does have history of atrial fibrillation, but Cardiology felt this was only related to the heart surgery before  He has not had confirmed episodes after, but the description of the patient is that he has had some odd symptoms at times, and now with the repeat TIA versus CVA, I feel he should probably start anticoagulation  Recommend Xarelto secondary to once a day at patient request   We did review the other medications as well  Follow with Cardiology in the future, but I do feel this is most important  Relevant Medications    metoprolol succinate (TOPROL-XL) 50 mg 24 hr tablet    rivaroxaban (XARELTO) 20 mg tablet    Other Relevant Orders    Ambulatory referral to Cardiology    Chest pain, atypical     Patient does continue to have some chest pain at times  He did follow with Cardiology  No changes for now  Relevant Orders    Ambulatory referral to Cardiology    H/O aortic aneurysm repair     Stable at the moment  No current symptoms  He did have a slight murmur  Relevant Orders    Ambulatory referral to Cardiology    Hyperlipidemia     Patient is overdue for labs  Recommend check, and follow up after that  He has been on Crestor 20 mg  Hypertension     Blood pressure is slightly elevated today  I would recommend that we consider increasing medication, i e  10 mg Norvasc  He did mention that he has some marks on his lower extremities when he removed his socks, so perhaps we should increase metoprolol from 50 twice a day  Change to metoprolol succinate 50 mg, 3 daily  Relevant Medications    metoprolol succinate (TOPROL-XL) 50 mg 24 hr tablet    Other Relevant Orders    Ambulatory referral to Cardiology    Obesity     BMI is elevated versus before  Limit carbohydrates, increase exercise           TIA (transient ischemic attack)     TIA was noted in the hospital previously, but the patient reports that he continues to have some side effects from that  It took him approximately 2 years to improve after the initial episode post surgical   Left upper extremity is weaker today  With the most recent episode 2-3 weeks ago, he has had problems remembering since then, though it seems to be more back to baseline since then  This does seem to represent persistent changes after the initial episode with his heart surgery  That does suggest stroke rather than TIA  Recommend MRI  Follow-up afterwards  Also would consider recommending anticoagulation with Xarelto versus Pradaxa verses Eliquis           Relevant Orders    MRI brain wo contrast

## 2019-12-26 NOTE — ASSESSMENT & PLAN NOTE
Patient is overdue for labs  Recommend check, and follow up after that  He has been on Crestor 20 mg

## 2019-12-26 NOTE — ASSESSMENT & PLAN NOTE
Blood pressure is slightly elevated today  I would recommend that we consider increasing medication, i e  10 mg Norvasc  He did mention that he has some marks on his lower extremities when he removed his socks, so perhaps we should increase metoprolol from 50 twice a day  Change to metoprolol succinate 50 mg, 3 daily

## 2019-12-26 NOTE — ASSESSMENT & PLAN NOTE
Patient does have history of atrial fibrillation, but Cardiology felt this was only related to the heart surgery before  He has not had confirmed episodes after, but the description of the patient is that he has had some odd symptoms at times, and now with the repeat TIA versus CVA, I feel he should probably start anticoagulation  Recommend Xarelto secondary to once a day at patient request   We did review the other medications as well  Follow with Cardiology in the future, but I do feel this is most important

## 2019-12-27 ENCOUNTER — APPOINTMENT (OUTPATIENT)
Dept: LAB | Facility: HOSPITAL | Age: 59
End: 2019-12-27
Payer: COMMERCIAL

## 2019-12-27 DIAGNOSIS — E55.9 VITAMIN D DEFICIENCY: ICD-10-CM

## 2019-12-27 DIAGNOSIS — I25.810 ATHEROSCLEROSIS OF CORONARY ARTERY BYPASS GRAFT OF NATIVE HEART WITHOUT ANGINA PECTORIS: ICD-10-CM

## 2019-12-27 DIAGNOSIS — I10 ESSENTIAL HYPERTENSION: ICD-10-CM

## 2019-12-27 DIAGNOSIS — E78.2 MIXED HYPERLIPIDEMIA: ICD-10-CM

## 2019-12-27 LAB
25(OH)D3 SERPL-MCNC: 11.9 NG/ML (ref 30–100)
ALBUMIN SERPL BCP-MCNC: 3.7 G/DL (ref 3.5–5)
ALP SERPL-CCNC: 136 U/L (ref 46–116)
ALT SERPL W P-5'-P-CCNC: 39 U/L (ref 12–78)
ANION GAP SERPL CALCULATED.3IONS-SCNC: 7 MMOL/L (ref 4–13)
AST SERPL W P-5'-P-CCNC: 21 U/L (ref 5–45)
BASOPHILS # BLD AUTO: 0.03 THOUSANDS/ΜL (ref 0–0.1)
BASOPHILS NFR BLD AUTO: 0 % (ref 0–1)
BILIRUB SERPL-MCNC: 1.22 MG/DL (ref 0.2–1)
BUN SERPL-MCNC: 8 MG/DL (ref 5–25)
CALCIUM SERPL-MCNC: 8.3 MG/DL (ref 8.3–10.1)
CHLORIDE SERPL-SCNC: 105 MMOL/L (ref 100–108)
CHOLEST SERPL-MCNC: 122 MG/DL (ref 50–200)
CO2 SERPL-SCNC: 29 MMOL/L (ref 21–32)
CREAT SERPL-MCNC: 1.09 MG/DL (ref 0.6–1.3)
EOSINOPHIL # BLD AUTO: 0.26 THOUSAND/ΜL (ref 0–0.61)
EOSINOPHIL NFR BLD AUTO: 3 % (ref 0–6)
ERYTHROCYTE [DISTWIDTH] IN BLOOD BY AUTOMATED COUNT: 13 % (ref 11.6–15.1)
GFR SERPL CREATININE-BSD FRML MDRD: 74 ML/MIN/1.73SQ M
GLUCOSE P FAST SERPL-MCNC: 107 MG/DL (ref 65–99)
HCT VFR BLD AUTO: 43.1 % (ref 36.5–49.3)
HDLC SERPL-MCNC: 33 MG/DL
HGB BLD-MCNC: 14.6 G/DL (ref 12–17)
IMM GRANULOCYTES # BLD AUTO: 0.02 THOUSAND/UL (ref 0–0.2)
IMM GRANULOCYTES NFR BLD AUTO: 0 % (ref 0–2)
LDLC SERPL CALC-MCNC: 72 MG/DL (ref 0–100)
LYMPHOCYTES # BLD AUTO: 1.57 THOUSANDS/ΜL (ref 0.6–4.47)
LYMPHOCYTES NFR BLD AUTO: 19 % (ref 14–44)
MCH RBC QN AUTO: 30.4 PG (ref 26.8–34.3)
MCHC RBC AUTO-ENTMCNC: 33.9 G/DL (ref 31.4–37.4)
MCV RBC AUTO: 90 FL (ref 82–98)
MONOCYTES # BLD AUTO: 0.48 THOUSAND/ΜL (ref 0.17–1.22)
MONOCYTES NFR BLD AUTO: 6 % (ref 4–12)
NEUTROPHILS # BLD AUTO: 5.9 THOUSANDS/ΜL (ref 1.85–7.62)
NEUTS SEG NFR BLD AUTO: 72 % (ref 43–75)
NRBC BLD AUTO-RTO: 0 /100 WBCS
PLATELET # BLD AUTO: 194 THOUSANDS/UL (ref 149–390)
PMV BLD AUTO: 10.1 FL (ref 8.9–12.7)
POTASSIUM SERPL-SCNC: 3.8 MMOL/L (ref 3.5–5.3)
PROT SERPL-MCNC: 7.1 G/DL (ref 6.4–8.2)
RBC # BLD AUTO: 4.8 MILLION/UL (ref 3.88–5.62)
SODIUM SERPL-SCNC: 141 MMOL/L (ref 136–145)
TRIGL SERPL-MCNC: 87 MG/DL
WBC # BLD AUTO: 8.26 THOUSAND/UL (ref 4.31–10.16)

## 2019-12-27 PROCEDURE — 36415 COLL VENOUS BLD VENIPUNCTURE: CPT

## 2019-12-27 PROCEDURE — 80053 COMPREHEN METABOLIC PANEL: CPT

## 2019-12-27 PROCEDURE — 80061 LIPID PANEL: CPT

## 2019-12-27 PROCEDURE — 85025 COMPLETE CBC W/AUTO DIFF WBC: CPT

## 2019-12-27 PROCEDURE — 82306 VITAMIN D 25 HYDROXY: CPT

## 2020-01-02 ENCOUNTER — HOSPITAL ENCOUNTER (OUTPATIENT)
Dept: MRI IMAGING | Facility: HOSPITAL | Age: 60
Discharge: HOME | End: 2020-01-02
Payer: COMMERCIAL

## 2020-01-02 DIAGNOSIS — G45.9 TIA (TRANSIENT ISCHEMIC ATTACK): ICD-10-CM

## 2020-01-02 PROCEDURE — 70551 MRI BRAIN STEM W/O DYE: CPT

## 2020-01-31 ENCOUNTER — OFFICE VISIT (OUTPATIENT)
Dept: FAMILY MEDICINE CLINIC | Facility: CLINIC | Age: 60
End: 2020-01-31
Payer: COMMERCIAL

## 2020-01-31 VITALS
WEIGHT: 253 LBS | SYSTOLIC BLOOD PRESSURE: 138 MMHG | BODY MASS INDEX: 38.34 KG/M2 | HEIGHT: 68 IN | TEMPERATURE: 97.1 F | DIASTOLIC BLOOD PRESSURE: 88 MMHG | HEART RATE: 62 BPM

## 2020-01-31 DIAGNOSIS — I63.81 LACUNAR INFARCTION (HCC): ICD-10-CM

## 2020-01-31 DIAGNOSIS — I10 ESSENTIAL HYPERTENSION: ICD-10-CM

## 2020-01-31 DIAGNOSIS — Z98.890 H/O AORTIC ANEURYSM REPAIR: ICD-10-CM

## 2020-01-31 DIAGNOSIS — E78.2 MIXED HYPERLIPIDEMIA: ICD-10-CM

## 2020-01-31 DIAGNOSIS — G45.9 TIA (TRANSIENT ISCHEMIC ATTACK): ICD-10-CM

## 2020-01-31 DIAGNOSIS — I63.40 CEREBROVASCULAR ACCIDENT (CVA) DUE TO EMBOLISM OF CEREBRAL ARTERY (HCC): ICD-10-CM

## 2020-01-31 DIAGNOSIS — E66.01 CLASS 2 SEVERE OBESITY DUE TO EXCESS CALORIES WITH SERIOUS COMORBIDITY AND BODY MASS INDEX (BMI) OF 38.0 TO 38.9 IN ADULT (HCC): ICD-10-CM

## 2020-01-31 DIAGNOSIS — R07.89 CHEST PAIN, ATYPICAL: ICD-10-CM

## 2020-01-31 DIAGNOSIS — Z86.79 H/O AORTIC ANEURYSM REPAIR: ICD-10-CM

## 2020-01-31 DIAGNOSIS — I73.9 MICROANGIOPATHY (HCC): ICD-10-CM

## 2020-01-31 DIAGNOSIS — I48.0 PAROXYSMAL ATRIAL FIBRILLATION (HCC): Primary | ICD-10-CM

## 2020-01-31 DIAGNOSIS — E55.9 VITAMIN D DEFICIENCY: ICD-10-CM

## 2020-01-31 PROBLEM — I63.9 CVA (CEREBRAL VASCULAR ACCIDENT) (HCC): Status: ACTIVE | Noted: 2020-01-31

## 2020-01-31 PROCEDURE — 3075F SYST BP GE 130 - 139MM HG: CPT | Performed by: FAMILY MEDICINE

## 2020-01-31 PROCEDURE — 3079F DIAST BP 80-89 MM HG: CPT | Performed by: FAMILY MEDICINE

## 2020-01-31 PROCEDURE — 3008F BODY MASS INDEX DOCD: CPT | Performed by: FAMILY MEDICINE

## 2020-01-31 PROCEDURE — 99215 OFFICE O/P EST HI 40 MIN: CPT | Performed by: FAMILY MEDICINE

## 2020-01-31 RX ORDER — METOPROLOL SUCCINATE 50 MG/1
150 TABLET, EXTENDED RELEASE ORAL DAILY
Qty: 270 TABLET | Refills: 1 | Status: SHIPPED | OUTPATIENT
Start: 2020-01-31 | End: 2020-03-27 | Stop reason: HOSPADM

## 2020-01-31 RX ORDER — AMLODIPINE BESYLATE 5 MG/1
TABLET ORAL
Qty: 90 TABLET | Refills: 0 | Status: SHIPPED | OUTPATIENT
Start: 2020-01-31 | End: 2020-02-10

## 2020-01-31 RX ORDER — MELATONIN
1000 DAILY
Qty: 30 TABLET | Refills: 11
Start: 2020-01-31 | End: 2020-03-30 | Stop reason: SDUPTHER

## 2020-01-31 NOTE — ASSESSMENT & PLAN NOTE
Blood pressure today was reasonable, he is concerned that lower blood pressures would cause more problems for him  Of note, he was not switched from metoprolol tartrate to metoprolol succinate secondary to pharmacy issue  Will send new prescription for metoprolol succinate to the pharmacy, and reinforced with the patient that this is a new prescription, i e  Succinate verses tartrate, therefore it is a completely different compound than the other, and will need to be started  This is more information for the pharmacy, not for the patient specifically  Lopressor verses Toprol XL

## 2020-01-31 NOTE — ASSESSMENT & PLAN NOTE
Vitamin-D level was slightly low  Recommend vitamin-D supplementation  On further discussion with the patient, it appears that the constipation he was having was likely secondary to iron supplementation, something that normally causes constipation  He was not sure that the vitamin-D would actually cause that, and he was agreeable to trying it again

## 2020-01-31 NOTE — ASSESSMENT & PLAN NOTE
BMI is 38 4, which is about the same as previous  No change for the moment  Limit carbohydrates, increase exercise

## 2020-01-31 NOTE — ASSESSMENT & PLAN NOTE
TIAs are likely the result of embolic events, given that the MRI did show some lacunar changes  With the changes, I would have to question if this is CVA or not, but at this time I would just leave as TIA, and follow-up as appropriate

## 2020-01-31 NOTE — ASSESSMENT & PLAN NOTE
MRI noted to have some microangiopathic changes  Again, decreased risk factors such as cholesterol, blood pressure, embolic events

## 2020-01-31 NOTE — PROGRESS NOTES
Assessment and Plan:    Problem List Items Addressed This Visit     Atrial fibrillation ([facility] [address] ) - Primary     At this point, would recommend that he start the Xarelto 20 mg daily given the atrial fibrillation that was found before  With regard to aspirin, would recommend that he discontinue it and speak with Cardiology at his appointment next week about possibly going on to 81 mg  This would be an augmentation to the Xarelto  If he does have aches or pains, would add Tylenol, not aspirin  Relevant Medications    metoprolol succinate (TOPROL-XL) 50 mg 24 hr tablet    Chest pain, atypical     Stable at the moment  Continue to follow with Cardiology as scheduled  CVA (cerebral vascular accident) [facility])     Patient reported symptoms of CVA like activity starting this past Monday  He does have continued weakness in the left upper extremity  Given that it was Monday, it is unlikely that there would be any benefit to tPA or other advanced treatment and hospitalization  Would strongly recommend that he start the Xarelto, which he has agreed to already  Continue with blood pressure control, cholesterol control  There are some persistent symptoms at this point, i e  The left upper extremity weakness as demonstrated on neurologic exam today  Consider follow-up with Neurology, but will defer to Cardiology for next office visit  This would be happening next week  Relevant Orders    Comprehensive metabolic panel    H/O aortic aneurysm repair     Follow-up with cardiology as scheduled  No changes at the moment  Status post repair of thoracic aneurysm  Hyperlipidemia     HDL is slightly low, but the remainder of his cholesterol is outstanding  Continue with Crestor 20 mg  Encouraged adding fish oils to his diet           Relevant Orders    Comprehensive metabolic panel    Lipid panel    Hypertension     Blood pressure today was reasonable, he is concerned that lower blood pressures would cause more problems for him  Of note, he was not switched from metoprolol tartrate to metoprolol succinate secondary to pharmacy issue  Will send new prescription for metoprolol succinate to the pharmacy, and reinforced with the patient that this is a new prescription, i e  Succinate verses tartrate, therefore it is a completely different compound than the other, and will need to be started  This is more information for the pharmacy, not for the patient specifically  Lopressor verses Toprol XL  Relevant Medications    metoprolol succinate (TOPROL-XL) 50 mg 24 hr tablet    Lacunar infarction [facility])     Patient did have lacunar infarcts noted on the MRI  There is the possibility that these could be embolic source, which would mean he should start the Xarelto soon as possible  Will follow with Cardiology as well  To try and decrease further events, would need to keep cholesterol and blood pressure under control  Relevant Orders    Comprehensive metabolic panel    Microangiopathy ([facility] [address] )     MRI noted to have some microangiopathic changes  Again, decreased risk factors such as cholesterol, blood pressure, embolic events  Obesity     BMI is 38 4, which is about the same as previous  No change for the moment  Limit carbohydrates, increase exercise  TIA (transient ischemic attack)     TIAs are likely the result of embolic events, given that the MRI did show some lacunar changes  With the changes, I would have to question if this is CVA or not, but at this time I would just leave as TIA, and follow-up as appropriate  Vitamin D deficiency     Vitamin-D level was slightly low  Recommend vitamin-D supplementation  On further discussion with the patient, it appears that the constipation he was having was likely secondary to iron supplementation, something that normally causes constipation    He was not sure that the vitamin-D would actually cause that, and he was agreeable to trying it again  Relevant Medications    cholecalciferol (VITAMIN D3) 1,000 units tablet    Other Relevant Orders    Vitamin D 25 hydroxy                 Diagnoses and all orders for this visit:    Paroxysmal atrial fibrillation (HCC)    Essential hypertension  -     metoprolol succinate (TOPROL-XL) 50 mg 24 hr tablet; Take 3 tablets (150 mg total) by mouth daily    Mixed hyperlipidemia  -     Comprehensive metabolic panel; Future  -     Lipid panel; Future    Microangiopathy (HCC)    TIA (transient ischemic attack)    Vitamin D deficiency  -     cholecalciferol (VITAMIN D3) 1,000 units tablet; Take 1 tablet (1,000 Units total) by mouth daily  -     Vitamin D 25 hydroxy; Future    Lacunar infarction [facility])  -     Comprehensive metabolic panel; Future    Class 2 severe obesity due to excess calories with serious comorbidity and body mass index (BMI) of 38 0 to 38 9 in adult [facility])    H/O aortic aneurysm repair    Chest pain, atypical    Cerebrovascular accident (CVA) due to embolism of cerebral artery ([address] )  -     Comprehensive metabolic panel; Future              Subjective:      Patient ID: Tamir Real is a 61 y o  male  CC:    Chief Complaint   Patient presents with    Follow-up     Pt states he has not started on the xarelto as he has question on stopping the aspirin before taking the xarelto  pt would also like to discuss getting a vit b12 injection today  HPI:    Patient has been started on Xarelto before, but he did not started secondary to being on aspirin for heart disease previously  He was concerned because aspirin was 325 mg  Reviewed use of Xarelto, as well as use with aspirin  CAD: Has seen Cardio in past     Hypertension:  Patient was on metoprolol tartrate previously, I asked him to switch to metoprolol succinate  Unfortunately, the pharmacy told him that because they were both named metoprolol, he could not get the succinate until he ran out of tartrate    Unfortunately, this is a different compound, with different actions and effects  Hyperlipidemia:  Did review his numbers  Currently on Crestor 20 mg  Patient is not currently using fish oil  Vitamin-D deficiency was noted on labs  He does report that he tried vitamin-D in the past, but had some constipation issues with that  MRI reviewed  This did show some lacunar infarcts  Patient did mention that he still feels some fatigue at times  He wondered about a B12 injection  Monday: He had some HA/pain  He had some weakness in the left side with this  Lasted all day, and by Wednesday, it was better, and then resolve  Some of the sensory changes on the left side seemed to be present still  He is more aware of touch sensation on the left than the right now  The following portions of the patient's history were reviewed and updated as appropriate: allergies, current medications, past family history, past medical history, past social history, past surgical history and problem list       Review of Systems   Constitutional: Negative  HENT: Negative  Eyes: Negative  Respiratory: Negative  Cardiovascular: Negative  Gastrointestinal: Negative  Endocrine: Negative  Genitourinary: Negative  Musculoskeletal: Negative  Skin: Negative  Allergic/Immunologic: Negative  Neurological: Positive for weakness and numbness  Hematological: Negative  Psychiatric/Behavioral: Negative  Data to review:   Sodium 141, potassium 3 8, calcium 8 3  Creatinine 1 09, GFR 74  AST 21, ALT 39  Blood sugar 107  White count 8 26  Hemoglobin 14 6, hematocrit 43 1, platelets 968  Total cholesterol 122, LDL 72, HDL 33, triglycerides 87  Vitamin-D 11 9  MRI of the brain showed moderate chronic precocious microvascular ischemic disease, stable    Several mm subacute lacunar infarctions involving subcortical white matter right posterior frontal convexity not previously evident  Objective:    Vitals:    01/31/20 0902   BP: 138/88   BP Location: Left arm   Patient Position: Sitting   Cuff Size: Adult   Pulse: 62   Temp: (!) 97 1 °F (36 2 °C)   TempSrc: Tympanic   Weight: 115 kg (253 lb)   Height: 5' 8" (1 727 m)        Physical Exam   Constitutional: He is oriented to person, place, and time  He appears well-developed and well-nourished  HENT:   Head: Normocephalic and atraumatic  Neck: Normal range of motion  Neck supple  Cardiovascular: Normal rate, regular rhythm and normal heart sounds  Exam reveals no gallop and no friction rub  No murmur heard  Pulses:       Carotid pulses are 2+ on the right side, and 2+ on the left side  Pulmonary/Chest: Effort normal and breath sounds normal  No respiratory distress  He has no wheezes  He has no rales  Neurological: He is alert and oriented to person, place, and time  He is not disoriented  No cranial nerve deficit or sensory deficit  Left upper extremity weakness with flexion and extension  Right upper extremity was normal    Nursing note and vitals reviewed  BMI Counseling: Body mass index is 38 47 kg/m²  The BMI is above normal  Nutrition recommendations include decreasing portion sizes, encouraging healthy choices of fruits and vegetables, decreasing fast food intake, consuming healthier snacks, limiting drinks that contain sugar, moderation in carbohydrate intake, increasing intake of lean protein, reducing intake of saturated and trans fat and reducing intake of cholesterol  Exercise recommendations include exercising 3-5 times per week  No pharmacotherapy was ordered

## 2020-01-31 NOTE — ASSESSMENT & PLAN NOTE
Follow-up with cardiology as scheduled  No changes at the moment  Status post repair of thoracic aneurysm

## 2020-01-31 NOTE — PATIENT INSTRUCTIONS
Problem List Items Addressed This Visit     Atrial fibrillation ([facility] [address] ) - Primary     At this point, would recommend that he start the Xarelto 20 mg daily given the atrial fibrillation that was found before  With regard to aspirin, would recommend that he discontinue it and speak with Cardiology at his appointment next week about possibly going on to 81 mg  This would be an augmentation to the Xarelto  If he does have aches or pains, would add Tylenol, not aspirin  Relevant Medications    metoprolol succinate (TOPROL-XL) 50 mg 24 hr tablet    Chest pain, atypical     Stable at the moment  Continue to follow with Cardiology as scheduled  CVA (cerebral vascular accident) [facility])     Patient reported symptoms of CVA like activity starting this past Monday  He does have continued weakness in the left upper extremity  Given that it was Monday, it is unlikely that there would be any benefit to tPA or other advanced treatment and hospitalization  Would strongly recommend that he start the Xarelto, which he has agreed to already  Continue with blood pressure control, cholesterol control  There are some persistent symptoms at this point, i e  The left upper extremity weakness as demonstrated on neurologic exam today  Consider follow-up with Neurology, but will defer to Cardiology for next office visit  This would be happening next week  Relevant Orders    Comprehensive metabolic panel    H/O aortic aneurysm repair     Follow-up with cardiology as scheduled  No changes at the moment  Status post repair of thoracic aneurysm  Hyperlipidemia     HDL is slightly low, but the remainder of his cholesterol is outstanding  Continue with Crestor 20 mg  Encouraged adding fish oils to his diet           Relevant Orders    Comprehensive metabolic panel    Lipid panel    Hypertension     Blood pressure today was reasonable, he is concerned that lower blood pressures would cause more problems for him  Of note, he was not switched from metoprolol tartrate to metoprolol succinate secondary to pharmacy issue  Will send new prescription for metoprolol succinate to the pharmacy, and reinforced with the patient that this is a new prescription, i e  Succinate verses tartrate, therefore it is a completely different compound than the other, and will need to be started  This is more information for the pharmacy, not for the patient specifically  Lopressor verses Toprol XL  Relevant Medications    metoprolol succinate (TOPROL-XL) 50 mg 24 hr tablet    Lacunar infarction [facility])     Patient did have lacunar infarcts noted on the MRI  There is the possibility that these could be embolic source, which would mean he should start the Xarelto soon as possible  Will follow with Cardiology as well  To try and decrease further events, would need to keep cholesterol and blood pressure under control  Relevant Orders    Comprehensive metabolic panel    Microangiopathy ([facility] [address] )     MRI noted to have some microangiopathic changes  Again, decreased risk factors such as cholesterol, blood pressure, embolic events  Obesity     BMI is 38 4, which is about the same as previous  No change for the moment  Limit carbohydrates, increase exercise  TIA (transient ischemic attack)     TIAs are likely the result of embolic events, given that the MRI did show some lacunar changes  With the changes, I would have to question if this is CVA or not, but at this time I would just leave as TIA, and follow-up as appropriate  Vitamin D deficiency     Vitamin-D level was slightly low  Recommend vitamin-D supplementation  On further discussion with the patient, it appears that the constipation he was having was likely secondary to iron supplementation, something that normally causes constipation  He was not sure that the vitamin-D would actually cause that, and he was agreeable to trying it again  Relevant Medications    cholecalciferol (VITAMIN D3) 1,000 units tablet    Other Relevant Orders    Vitamin D 25 hydroxy

## 2020-01-31 NOTE — ASSESSMENT & PLAN NOTE
At this point, would recommend that he start the Xarelto 20 mg daily given the atrial fibrillation that was found before  With regard to aspirin, would recommend that he discontinue it and speak with Cardiology at his appointment next week about possibly going on to 81 mg  This would be an augmentation to the Xarelto  If he does have aches or pains, would add Tylenol, not aspirin

## 2020-01-31 NOTE — ASSESSMENT & PLAN NOTE
HDL is slightly low, but the remainder of his cholesterol is outstanding  Continue with Crestor 20 mg  Encouraged adding fish oils to his diet

## 2020-01-31 NOTE — ASSESSMENT & PLAN NOTE
Patient did have lacunar infarcts noted on the MRI  There is the possibility that these could be embolic source, which would mean he should start the Xarelto soon as possible  Will follow with Cardiology as well  To try and decrease further events, would need to keep cholesterol and blood pressure under control

## 2020-01-31 NOTE — ASSESSMENT & PLAN NOTE
Patient reported symptoms of CVA like activity starting this past Monday  He does have continued weakness in the left upper extremity  Given that it was Monday, it is unlikely that there would be any benefit to tPA or other advanced treatment and hospitalization  Would strongly recommend that he start the Xarelto, which he has agreed to already  Continue with blood pressure control, cholesterol control  There are some persistent symptoms at this point, i e  The left upper extremity weakness as demonstrated on neurologic exam today  Consider follow-up with Neurology, but will defer to Cardiology for next office visit  This would be happening next week

## 2020-02-05 ENCOUNTER — OFFICE VISIT (OUTPATIENT)
Dept: CARDIOLOGY CLINIC | Facility: CLINIC | Age: 60
End: 2020-02-05
Payer: COMMERCIAL

## 2020-02-05 VITALS
BODY MASS INDEX: 38.34 KG/M2 | HEART RATE: 67 BPM | HEIGHT: 68 IN | DIASTOLIC BLOOD PRESSURE: 68 MMHG | SYSTOLIC BLOOD PRESSURE: 122 MMHG | WEIGHT: 253 LBS

## 2020-02-05 DIAGNOSIS — I10 ESSENTIAL HYPERTENSION: ICD-10-CM

## 2020-02-05 DIAGNOSIS — Z86.79 H/O AORTIC ANEURYSM REPAIR: ICD-10-CM

## 2020-02-05 DIAGNOSIS — R07.89 CHEST PAIN, ATYPICAL: ICD-10-CM

## 2020-02-05 DIAGNOSIS — Z98.890 H/O AORTIC ANEURYSM REPAIR: ICD-10-CM

## 2020-02-05 DIAGNOSIS — I63.9 CEREBROVASCULAR ACCIDENT (CVA), UNSPECIFIED MECHANISM (HCC): Primary | ICD-10-CM

## 2020-02-05 DIAGNOSIS — I48.0 PAROXYSMAL ATRIAL FIBRILLATION (HCC): ICD-10-CM

## 2020-02-05 PROCEDURE — 3074F SYST BP LT 130 MM HG: CPT | Performed by: INTERNAL MEDICINE

## 2020-02-05 PROCEDURE — 3078F DIAST BP <80 MM HG: CPT | Performed by: INTERNAL MEDICINE

## 2020-02-05 PROCEDURE — 3008F BODY MASS INDEX DOCD: CPT | Performed by: INTERNAL MEDICINE

## 2020-02-05 PROCEDURE — 93000 ELECTROCARDIOGRAM COMPLETE: CPT | Performed by: INTERNAL MEDICINE

## 2020-02-05 PROCEDURE — 1036F TOBACCO NON-USER: CPT | Performed by: INTERNAL MEDICINE

## 2020-02-05 PROCEDURE — 99214 OFFICE O/P EST MOD 30 MIN: CPT | Performed by: INTERNAL MEDICINE

## 2020-02-05 NOTE — PROGRESS NOTES
Cardiology Follow Up        [place]      1960      [id number]      Discussion/Summary:    1  Benign essential hypertension  2  Bicuspid aortic valve with mild aortic valve stenosis  3  History of significant ascending aortic aneurysm status post replacement with matthew arch construction in 2014 (bicuspid valve not replaced at that time)  4  Paroxysmal atrial fibrillation--noted postOP 2014 without objective recurrence  5  Prior lacunar infarcts on MRI brain 1/2020  6  Chronic atypical chest pain      · Blood pressure well controlled, continue amlodipine 5 mg daily, metoprolol succinate 150 mg daily  · Prior lipid panel 12/27/2019 with LDL cholesterol close to goal at 72 mg/dL and low HDL  Continue rosuvastatin  · Recent MRI brain revealing lacunar infarcts, changed from prior report of 2017  Uncertain if these are related to his atrial fibrillation, as there has been no objective recurrence of his atrial fibrillation  However, in light of this history, do agree that Xarelto anticoagulation is reasonable at this point  I have strongly recommend Neurology consultation for further evaluation and he is agreeable  Would defer final recommendations for anticoagulation to their expertise  · ECG revealing normal sinus rhythm      Follow-up in 6 months        Interval History:      This is a very 62 YO male with a significant history of ascending aortic aneurysm, status post open replacement of the ascending aorta with matthew arch reconstruction on 11/06/2014   Preoperative cardiac catheterization was unremarkable   Postoperatively, he had transient atrial fibrillation, which converted to sinus rhythm on amiodarone/metoprolol, without any recurrence   Therefore he has not been maintained on anticoagulation while seeing Dr Iesha Morton in the past  [facility] also has a history of bicuspid aortic valve without significant stenosis, hypertension      He was last hospitalized August 2017 for numbness on his face, left arm, and left leg, with MRI brain which was unremarkable for stroke, without any evidence of atrial fibrillation on telemetry   MRI cervical spine 09/2017 was unremarkable   Echocardiogram on 8/27/17 revealed a normal ejection fraction at 60% with no evidence of aortic stenosis and no regurgitation   The valve leaflets were not well visualized      Prior Holter monitor October 2017 revealed no evidence of atrial fibrillation, only occasional PACs and PVCs      During his prior visit 01/2019 he had complaints of atypical chest pain  Thereafter he underwent a nuclear stress test 02/2019 which was unremarkable  Echocardiogram revealed mild aortic stenosis  His losartan was discontinued 09/2019 secondary to angioedema  During his appointment with his PCP 12/26/2019, MRI of his head was recommended secondary to ongoing left arm weakness  MRI brain 01/02/2020 reported several subacute lacunar infarctions with microvascular ischemic disease which was stable  Subsequently, he was started on Xarelto by his PCP in light of his prior history of paroxysmal atrial fibrillation which was postoperative  He presents today for follow-up  From a symptomatic standpoint he feels well without exertional chest pain, shortness of breath  He states he rarely gets palpitations, perhaps once monthly at most, lasting only seconds             Vitals:  Vitals:    02/05/20 1037   BP: 122/68   Pulse: 67   Weight: 115 kg (253 lb)   Height: 5' 8" (1 727 m)         Past Medical History:   Diagnosis Date    Aneurysm of thoracic aorta ([address] )     last assessed 11/20/17    Anxiety     currently on no meds    Cardiac disease     Cholelithiasis     Chronic kidney disease     GERD (gastroesophageal reflux disease)     Headache due to anesthesia     Hyperlipidemia     Hypertension     Irritable bowel syndrome     Kidney stone     Palpitations     PONV (postoperative nausea and vomiting)     Shortness of breath     ? related to acid reflux    Sleep apnea     not sure    Stroke [facility]) 11/2014    TIA (transient ischemic attack)      Social History     Socioeconomic History    Marital status:      Spouse name: Not on file    Number of children: Not on file    Years of education: Not on file    Highest education level: Not on file   Occupational History    Occupation: disabled     Social Needs    Financial resource strain: Not on file    Food insecurity:     Worry: Not on file     Inability: Not on file   Widdle needs:     Medical: Not on file     Non-medical: Not on file   Tobacco Use    Smoking status: Never Smoker    Smokeless tobacco: Never Used    Tobacco comment: no second hand smoke exposure   Substance and Sexual Activity    Alcohol use: No    Drug use: No    Sexual activity: Not on file   Lifestyle    Physical activity:     Days per week: Not on file     Minutes per session: Not on file    Stress: Not on file   Relationships    Social connections:     Talks on phone: Not on file     Gets together: Not on file     Attends Gnosticism service: Not on file     Active member of club or organization: Not on file     Attends meetings of clubs or organizations: Not on file     Relationship status: Not on file    Intimate partner violence:     Fear of current or ex partner: Not on file     Emotionally abused: Not on file     Physically abused: Not on file     Forced sexual activity: Not on file   Other Topics Concern    Not on file   Social History Narrative    Caffeine use    Completed some college     as per [employer]      Family History   Problem Relation Age of Onset    Diverticulitis Mother         of colon    Nephrolithiasis Mother     Emphysema Father     Nephrolithiasis Sister     Heart attack Maternal Grandfather 72    Breast cancer Paternal Grandmother     Lung cancer Paternal Grandfather     Cancer Family     Coronary artery disease Family  Diabetes Family         sibling    Hypertension Family         sibling    Hernia Family         sibling     Past Surgical History:   Procedure Laterality Date    AORTA SURGERY      thoracic - aneurysmorrhaphy    APPENDECTOMY      ASCENDING AORTIC ANEURYSM REPAIR      resolved 8/19/15    CHOLECYSTECTOMY      laparoscopic    CYSTOSCOPY      with removal of object- stent removal    KNEE ARTHROSCOPY Right 1996    with medial meniscus repair    LITHOTRIPSY      renal    OH FRAGMENT KIDNEY STONE/ ESWL Left 5/17/2018    Procedure: LITHROTRIPSY EXTRACORPORAL SHOCKWAVE (ESWL); Surgeon: Kwadwo Young MD;  Location: [facility]  MAIN [facility];  Service: Urology    THUMB ARTHROSCOPY Right 1976    ligament repair       Current Outpatient Medications:     amLODIPine (NORVASC) 5 mg tablet, TAKE 1 TABLET BY MOUTH EVERY DAY, Disp: 90 tablet, Rfl: 0    amoxicillin (AMOXIL) 500 mg capsule, TAKE 4 CAPSULES 1 HOUR PRIOR TO APPOINTMENT, Disp: , Rfl: 1    cholecalciferol (VITAMIN D3) 1,000 units tablet, Take 1 tablet (1,000 Units total) by mouth daily, Disp: 30 tablet, Rfl: 11    metoprolol succinate (TOPROL-XL) 50 mg 24 hr tablet, Take 3 tablets (150 mg total) by mouth daily, Disp: 270 tablet, Rfl: 1    pantoprazole (PROTONIX) 40 mg tablet, TAKE 1 TABLET (40 MG TOTAL) BY MOUTH DAILY, Disp: 90 tablet, Rfl: 0    rivaroxaban (XARELTO) 20 mg tablet, Take 1 tablet (20 mg total) by mouth daily with breakfast, Disp: 30 tablet, Rfl: 5    rosuvastatin (CRESTOR) 20 MG tablet, TAKE 1 TABLET BY MOUTH EVERY DAY, Disp: 90 tablet, Rfl: 1        Review of Systems:  Review of Systems   Respiratory: Negative  Cardiovascular: Negative  All other systems reviewed and are negative  Physical Exam:  Physical Exam   Constitutional: He is oriented to person, place, and time  He appears well-developed and well-nourished  No distress  HENT:   Head: Normocephalic and atraumatic  Eyes: Pupils are equal, round, and reactive to light   EOM are normal  Right eye exhibits no discharge  No scleral icterus  Neck: Normal range of motion  Neck supple  No thyromegaly present  Cardiovascular: Normal rate, regular rhythm and normal heart sounds  Exam reveals no gallop and no friction rub  No murmur heard  Pulmonary/Chest: Effort normal and breath sounds normal    Abdominal: He exhibits no distension  There is no tenderness  There is no rebound and no guarding  Musculoskeletal: Normal range of motion  He exhibits no edema  Neurological: He is alert and oriented to person, place, and time  Coordination normal    Skin: Skin is warm and dry  No rash noted  He is not diaphoretic  No erythema  No pallor  Psychiatric: He has a normal mood and affect  His behavior is normal  Judgment and thought content normal        This note was completed in part utilizing [employer]  Grammatical errors, random word insertions, spelling mistakes, and incomplete sentences can be an occasional consequence of this system secondary to software limitations, ambient noise, and hardware issues  If you have any questions or concerns about the content, text, or information contained within the body of this dictation, please contact the provider for clarification

## 2020-02-07 DIAGNOSIS — K21.9 GASTROESOPHAGEAL REFLUX DISEASE WITHOUT ESOPHAGITIS: ICD-10-CM

## 2020-02-07 RX ORDER — PANTOPRAZOLE SODIUM 40 MG/1
40 TABLET, DELAYED RELEASE ORAL DAILY
Qty: 90 TABLET | Refills: 0 | Status: SHIPPED | OUTPATIENT
Start: 2020-02-07 | End: 2020-03-27 | Stop reason: HOSPADM

## 2020-02-09 DIAGNOSIS — I10 ESSENTIAL HYPERTENSION: ICD-10-CM

## 2020-02-10 RX ORDER — AMLODIPINE BESYLATE 5 MG/1
TABLET ORAL
Qty: 90 TABLET | Refills: 0 | Status: ON HOLD | OUTPATIENT
Start: 2020-02-10 | End: 2020-03-26 | Stop reason: SDUPTHER

## 2020-03-12 ENCOUNTER — HOSPITAL ENCOUNTER (INPATIENT)
Facility: HOSPITAL | Age: 60
LOS: 4 days | DRG: 041 | End: 2020-03-17
Attending: EMERGENCY MEDICINE | Admitting: HOSPITALIST
Payer: COMMERCIAL

## 2020-03-12 ENCOUNTER — APPOINTMENT (EMERGENCY)
Dept: RADIOLOGY | Facility: HOSPITAL | Age: 60
DRG: 041 | End: 2020-03-12
Payer: COMMERCIAL

## 2020-03-12 DIAGNOSIS — R53.1 RIGHT SIDED WEAKNESS: Primary | ICD-10-CM

## 2020-03-12 DIAGNOSIS — I63.40 CEREBROVASCULAR ACCIDENT (CVA) DUE TO EMBOLISM OF CEREBRAL ARTERY (HCC): ICD-10-CM

## 2020-03-12 LAB
ANION GAP SERPL CALCULATED.3IONS-SCNC: 3 MMOL/L (ref 4–13)
APTT PPP: 37 SECONDS (ref 23–37)
BUN SERPL-MCNC: 13 MG/DL (ref 5–25)
CALCIUM SERPL-MCNC: 8.7 MG/DL (ref 8.3–10.1)
CHLORIDE SERPL-SCNC: 106 MMOL/L (ref 100–108)
CO2 SERPL-SCNC: 29 MMOL/L (ref 21–32)
CREAT SERPL-MCNC: 1.07 MG/DL (ref 0.6–1.3)
ERYTHROCYTE [DISTWIDTH] IN BLOOD BY AUTOMATED COUNT: 13 % (ref 11.6–15.1)
GFR SERPL CREATININE-BSD FRML MDRD: 75 ML/MIN/1.73SQ M
GLUCOSE SERPL-MCNC: 120 MG/DL (ref 65–140)
GLUCOSE SERPL-MCNC: 129 MG/DL (ref 65–140)
HCT VFR BLD AUTO: 44.4 % (ref 36.5–49.3)
HGB BLD-MCNC: 15.3 G/DL (ref 12–17)
INR PPP: 1.8 (ref 0.84–1.19)
MCH RBC QN AUTO: 30.1 PG (ref 26.8–34.3)
MCHC RBC AUTO-ENTMCNC: 34.5 G/DL (ref 31.4–37.4)
MCV RBC AUTO: 87 FL (ref 82–98)
PLATELET # BLD AUTO: 179 THOUSANDS/UL (ref 149–390)
PMV BLD AUTO: 9.7 FL (ref 8.9–12.7)
POTASSIUM SERPL-SCNC: 3.5 MMOL/L (ref 3.5–5.3)
PROTHROMBIN TIME: 20.4 SECONDS (ref 11.6–14.5)
RBC # BLD AUTO: 5.08 MILLION/UL (ref 3.88–5.62)
SODIUM SERPL-SCNC: 138 MMOL/L (ref 136–145)
TROPONIN I SERPL-MCNC: <0.02 NG/ML
WBC # BLD AUTO: 8.32 THOUSAND/UL (ref 4.31–10.16)

## 2020-03-12 PROCEDURE — 85730 THROMBOPLASTIN TIME PARTIAL: CPT | Performed by: EMERGENCY MEDICINE

## 2020-03-12 PROCEDURE — 36415 COLL VENOUS BLD VENIPUNCTURE: CPT

## 2020-03-12 PROCEDURE — 84484 ASSAY OF TROPONIN QUANT: CPT | Performed by: EMERGENCY MEDICINE

## 2020-03-12 PROCEDURE — 99291 CRITICAL CARE FIRST HOUR: CPT

## 2020-03-12 PROCEDURE — 93005 ELECTROCARDIOGRAM TRACING: CPT

## 2020-03-12 PROCEDURE — 80048 BASIC METABOLIC PNL TOTAL CA: CPT | Performed by: EMERGENCY MEDICINE

## 2020-03-12 PROCEDURE — 85027 COMPLETE CBC AUTOMATED: CPT | Performed by: EMERGENCY MEDICINE

## 2020-03-12 PROCEDURE — 82948 REAGENT STRIP/BLOOD GLUCOSE: CPT

## 2020-03-12 PROCEDURE — 71045 X-RAY EXAM CHEST 1 VIEW: CPT

## 2020-03-12 PROCEDURE — 85610 PROTHROMBIN TIME: CPT | Performed by: EMERGENCY MEDICINE

## 2020-03-12 PROCEDURE — 99291 CRITICAL CARE FIRST HOUR: CPT | Performed by: EMERGENCY MEDICINE

## 2020-03-12 RX ADMIN — IOHEXOL 85 ML: 350 INJECTION, SOLUTION INTRAVENOUS at 23:35

## 2020-03-13 ENCOUNTER — APPOINTMENT (INPATIENT)
Dept: NON INVASIVE DIAGNOSTICS | Facility: HOSPITAL | Age: 60
DRG: 041 | End: 2020-03-13
Payer: COMMERCIAL

## 2020-03-13 PROBLEM — Z98.890 H/O AORTIC ANEURYSM REPAIR: Chronic | Status: ACTIVE | Noted: 2017-08-26

## 2020-03-13 PROBLEM — E78.5 HYPERLIPIDEMIA: Chronic | Status: ACTIVE | Noted: 2017-03-17

## 2020-03-13 PROBLEM — R29.90 STROKE-LIKE SYMPTOMS: Status: ACTIVE | Noted: 2020-03-13

## 2020-03-13 PROBLEM — I10 ESSENTIAL HYPERTENSION: Chronic | Status: ACTIVE | Noted: 2017-08-26

## 2020-03-13 PROBLEM — Z86.73 HISTORY OF CVA (CEREBROVASCULAR ACCIDENT): Status: ACTIVE | Noted: 2020-03-13

## 2020-03-13 PROBLEM — Z86.79 H/O AORTIC ANEURYSM REPAIR: Chronic | Status: ACTIVE | Noted: 2017-08-26

## 2020-03-13 PROBLEM — Z86.73 HISTORY OF CVA (CEREBROVASCULAR ACCIDENT): Chronic | Status: ACTIVE | Noted: 2020-03-13

## 2020-03-13 PROBLEM — E66.09 CLASS 2 OBESITY DUE TO EXCESS CALORIES IN ADULT: Chronic | Status: ACTIVE | Noted: 2018-05-02

## 2020-03-13 PROBLEM — E66.09 CLASS 2 OBESITY DUE TO EXCESS CALORIES IN ADULT: Status: ACTIVE | Noted: 2018-05-02

## 2020-03-13 LAB
ANION GAP SERPL CALCULATED.3IONS-SCNC: 5 MMOL/L (ref 4–13)
ATRIAL RATE: 74 BPM
BUN SERPL-MCNC: 10 MG/DL (ref 5–25)
CALCIUM SERPL-MCNC: 8.7 MG/DL (ref 8.3–10.1)
CHLORIDE SERPL-SCNC: 106 MMOL/L (ref 100–108)
CHOLEST SERPL-MCNC: 124 MG/DL (ref 50–200)
CO2 SERPL-SCNC: 29 MMOL/L (ref 21–32)
CREAT SERPL-MCNC: 0.97 MG/DL (ref 0.6–1.3)
CRP SERPL HS-MCNC: 4.12 MG/L
CRP SERPL QL: 4.6 MG/L
ERYTHROCYTE [DISTWIDTH] IN BLOOD BY AUTOMATED COUNT: 13.2 % (ref 11.6–15.1)
EST. AVERAGE GLUCOSE BLD GHB EST-MCNC: 111 MG/DL
GFR SERPL CREATININE-BSD FRML MDRD: 84 ML/MIN/1.73SQ M
GLUCOSE SERPL-MCNC: 126 MG/DL (ref 65–140)
HBA1C MFR BLD: 5.5 %
HCT VFR BLD AUTO: 43.2 % (ref 36.5–49.3)
HCYS SERPL-SCNC: 9.4 UMOL/L (ref 5.3–14.2)
HDLC SERPL-MCNC: 37 MG/DL
HGB BLD-MCNC: 14.9 G/DL (ref 12–17)
LDLC SERPL CALC-MCNC: 76 MG/DL (ref 0–100)
MCH RBC QN AUTO: 30.5 PG (ref 26.8–34.3)
MCHC RBC AUTO-ENTMCNC: 34.5 G/DL (ref 31.4–37.4)
MCV RBC AUTO: 88 FL (ref 82–98)
P AXIS: 57 DEGREES
PLATELET # BLD AUTO: 174 THOUSANDS/UL (ref 149–390)
PMV BLD AUTO: 10.2 FL (ref 8.9–12.7)
POTASSIUM SERPL-SCNC: 3.4 MMOL/L (ref 3.5–5.3)
PR INTERVAL: 166 MS
QRS AXIS: 38 DEGREES
QRSD INTERVAL: 86 MS
QT INTERVAL: 366 MS
QTC INTERVAL: 406 MS
RBC # BLD AUTO: 4.89 MILLION/UL (ref 3.88–5.62)
SODIUM SERPL-SCNC: 140 MMOL/L (ref 136–145)
T WAVE AXIS: 35 DEGREES
TRIGL SERPL-MCNC: 55 MG/DL
TROPONIN I SERPL-MCNC: <0.02 NG/ML
TROPONIN I SERPL-MCNC: <0.02 NG/ML
VENTRICULAR RATE: 74 BPM
WBC # BLD AUTO: 8.1 THOUSAND/UL (ref 4.31–10.16)

## 2020-03-13 PROCEDURE — 99223 1ST HOSP IP/OBS HIGH 75: CPT | Performed by: PSYCHIATRY & NEUROLOGY

## 2020-03-13 PROCEDURE — 97167 OT EVAL HIGH COMPLEX 60 MIN: CPT

## 2020-03-13 PROCEDURE — 99223 1ST HOSP IP/OBS HIGH 75: CPT | Performed by: HOSPITALIST

## 2020-03-13 PROCEDURE — 86140 C-REACTIVE PROTEIN: CPT | Performed by: HOSPITALIST

## 2020-03-13 PROCEDURE — 83036 HEMOGLOBIN GLYCOSYLATED A1C: CPT | Performed by: HOSPITALIST

## 2020-03-13 PROCEDURE — 99223 1ST HOSP IP/OBS HIGH 75: CPT | Performed by: PHYSICAL MEDICINE & REHABILITATION

## 2020-03-13 PROCEDURE — 93306 TTE W/DOPPLER COMPLETE: CPT | Performed by: INTERNAL MEDICINE

## 2020-03-13 PROCEDURE — 97530 THERAPEUTIC ACTIVITIES: CPT

## 2020-03-13 PROCEDURE — 86141 C-REACTIVE PROTEIN HS: CPT | Performed by: HOSPITALIST

## 2020-03-13 PROCEDURE — 93306 TTE W/DOPPLER COMPLETE: CPT

## 2020-03-13 PROCEDURE — 92610 EVALUATE SWALLOWING FUNCTION: CPT

## 2020-03-13 PROCEDURE — 84484 ASSAY OF TROPONIN QUANT: CPT | Performed by: HOSPITALIST

## 2020-03-13 PROCEDURE — 97535 SELF CARE MNGMENT TRAINING: CPT

## 2020-03-13 PROCEDURE — 80061 LIPID PANEL: CPT | Performed by: HOSPITALIST

## 2020-03-13 PROCEDURE — 97163 PT EVAL HIGH COMPLEX 45 MIN: CPT

## 2020-03-13 PROCEDURE — 83090 ASSAY OF HOMOCYSTEINE: CPT | Performed by: HOSPITALIST

## 2020-03-13 PROCEDURE — 85027 COMPLETE CBC AUTOMATED: CPT | Performed by: HOSPITALIST

## 2020-03-13 PROCEDURE — 93010 ELECTROCARDIOGRAM REPORT: CPT | Performed by: INTERNAL MEDICINE

## 2020-03-13 PROCEDURE — 80048 BASIC METABOLIC PNL TOTAL CA: CPT | Performed by: HOSPITALIST

## 2020-03-13 RX ORDER — ONDANSETRON 2 MG/ML
4 INJECTION INTRAMUSCULAR; INTRAVENOUS EVERY 6 HOURS PRN
Status: DISCONTINUED | OUTPATIENT
Start: 2020-03-13 | End: 2020-03-17 | Stop reason: HOSPADM

## 2020-03-13 RX ORDER — POTASSIUM CHLORIDE 20 MEQ/1
40 TABLET, EXTENDED RELEASE ORAL ONCE
Status: COMPLETED | OUTPATIENT
Start: 2020-03-13 | End: 2020-03-13

## 2020-03-13 RX ORDER — SODIUM CHLORIDE 9 MG/ML
75 INJECTION, SOLUTION INTRAVENOUS CONTINUOUS
Status: DISPENSED | OUTPATIENT
Start: 2020-03-13 | End: 2020-03-13

## 2020-03-13 RX ORDER — ATORVASTATIN CALCIUM 40 MG/1
40 TABLET, FILM COATED ORAL
Status: DISCONTINUED | OUTPATIENT
Start: 2020-03-13 | End: 2020-03-17 | Stop reason: HOSPADM

## 2020-03-13 RX ORDER — DOCUSATE SODIUM 100 MG/1
100 CAPSULE, LIQUID FILLED ORAL 2 TIMES DAILY
Status: DISCONTINUED | OUTPATIENT
Start: 2020-03-13 | End: 2020-03-17 | Stop reason: HOSPADM

## 2020-03-13 RX ORDER — AMLODIPINE BESYLATE 5 MG/1
5 TABLET ORAL DAILY
Status: DISCONTINUED | OUTPATIENT
Start: 2020-03-13 | End: 2020-03-17 | Stop reason: HOSPADM

## 2020-03-13 RX ORDER — PANTOPRAZOLE SODIUM 40 MG/1
40 TABLET, DELAYED RELEASE ORAL
Status: DISCONTINUED | OUTPATIENT
Start: 2020-03-13 | End: 2020-03-17 | Stop reason: HOSPADM

## 2020-03-13 RX ORDER — ASPIRIN 81 MG/1
81 TABLET, CHEWABLE ORAL DAILY
Status: DISCONTINUED | OUTPATIENT
Start: 2020-03-13 | End: 2020-03-13

## 2020-03-13 RX ORDER — MELATONIN
1000 DAILY
Status: DISCONTINUED | OUTPATIENT
Start: 2020-03-13 | End: 2020-03-17 | Stop reason: HOSPADM

## 2020-03-13 RX ORDER — SENNOSIDES 8.6 MG
1 TABLET ORAL DAILY
Status: DISCONTINUED | OUTPATIENT
Start: 2020-03-13 | End: 2020-03-17 | Stop reason: HOSPADM

## 2020-03-13 RX ADMIN — POTASSIUM CHLORIDE 40 MEQ: 1500 TABLET, EXTENDED RELEASE ORAL at 09:07

## 2020-03-13 RX ADMIN — SODIUM CHLORIDE 75 ML/HR: 0.9 INJECTION, SOLUTION INTRAVENOUS at 03:27

## 2020-03-13 RX ADMIN — MELATONIN 1000 UNITS: at 09:07

## 2020-03-13 RX ADMIN — METOPROLOL SUCCINATE 150 MG: 100 TABLET, EXTENDED RELEASE ORAL at 09:06

## 2020-03-13 RX ADMIN — AMLODIPINE BESYLATE 5 MG: 5 TABLET ORAL at 09:07

## 2020-03-13 RX ADMIN — DOCUSATE SODIUM 100 MG: 100 CAPSULE, LIQUID FILLED ORAL at 16:51

## 2020-03-13 RX ADMIN — SENNOSIDES 8.6 MG: 8.6 TABLET ORAL at 09:07

## 2020-03-13 RX ADMIN — RIVAROXABAN 20 MG: 20 TABLET, FILM COATED ORAL at 09:08

## 2020-03-13 RX ADMIN — ASPIRIN 81 MG 81 MG: 81 TABLET ORAL at 09:06

## 2020-03-13 RX ADMIN — DOCUSATE SODIUM 100 MG: 100 CAPSULE, LIQUID FILLED ORAL at 09:07

## 2020-03-13 RX ADMIN — ATORVASTATIN CALCIUM 40 MG: 40 TABLET, FILM COATED ORAL at 16:46

## 2020-03-13 RX ADMIN — PANTOPRAZOLE SODIUM 40 MG: 40 TABLET, DELAYED RELEASE ORAL at 06:05

## 2020-03-13 NOTE — PHYSICAL THERAPY NOTE
Physical Therapy Evaluation    Patient Name: Najma Blair    LCBGJ'B Date: 3/13/2020     Problem List  Principal Problem:    Stroke-like symptoms  Active Problems:    H/O aortic aneurysm repair    Essential hypertension    PAF (paroxysmal atrial fibrillation) (HCC)    Hyperlipidemia    Class 2 obesity due to excess calories in adult    History of CVA (cerebrovascular accident)       Past Medical History  Past Medical History:   Diagnosis Date    Aneurysm of thoracic aorta ([address] )     last assessed 11/20/17    Anxiety     currently on no meds    Cardiac disease     Cholelithiasis     Chronic kidney disease     GERD (gastroesophageal reflux disease)     Headache due to anesthesia     Hyperlipidemia     Hypertension     Irritable bowel syndrome     Kidney stone     Palpitations     PONV (postoperative nausea and vomiting)     Shortness of breath     ? related to acid reflux    Sleep apnea     not sure    Stroke [facility]) 11/2014    TIA (transient ischemic attack)         Past Surgical History  Past Surgical History:   Procedure Laterality Date    AORTA SURGERY      thoracic - aneurysmorrhaphy    APPENDECTOMY      ASCENDING AORTIC ANEURYSM REPAIR      resolved 8/19/15    CHOLECYSTECTOMY      laparoscopic    CYSTOSCOPY      with removal of object- stent removal    KNEE ARTHROSCOPY Right 1996    with medial meniscus repair    LITHOTRIPSY      renal    DC FRAGMENT KIDNEY STONE/ ESWL Left 5/17/2018    Procedure: LITHROTRIPSY EXTRACORPORAL SHOCKWAVE (ESWL);   Surgeon: Raquel Montoya MD;  Location: [facility];  Service: Urology    THUMB ARTHROSCOPY Right 1976    ligament repair         03/13/20 0918   Note Type   Note type Eval/Treat   Pain Assessment   Pain Assessment Tool Pain Assessment not indicated - pt denies pain   Pain Score No Pain   Home Living   Type of Home House  [facility] with 6STE and FF to 2nd floor bed/bathroom )   Home Layout Two level;Stairs to enter with rails;Bed/bath upstairs  (No bath on first floor)   Bathroom Shower/Tub Tub/shower unit   Bathroom Toilet Standard   Home Equipment   (no DME at baseline)   Additional Comments Pt resides with his sister who works during the day  Pt independent prior with all functional mobility, ADLs, and IADLs without DME  Prior Function   Level of [place] Independent with ADLs and functional mobility   Lives With   (Sister)   Receives Help From Family   ADL Assistance Independent   IADLs Independent   Falls in the last 6 months 0   Vocational Part time employment  ("")   Restrictions/Precautions   [facility] Bearing Precautions Per Order No   Braces or Orthoses   (none)   Other Precautions Cognitive; Bed Alarm; Chair Alarm; Fall Risk;Pain;Multiple lines  (RLE paresis )   Cognition   Overall Cognitive Status Impaired   Arousal/Participation Responsive   Orientation Level Oriented to person;Oriented to place;Oriented to time;Oriented to situation   Following Commands Follows one step commands with increased time or repetition   Comments Pt presents with some limiting behavior as every time patient is asked to perform a task, pt responds by saying "I can't do it alone"  Pt with h/o CVA and reported his only residual deficits are "brain scrambling and night terrors"  RLE Assessment   RLE Assessment X   Strength RLE   R Hip Flexion 1/5   R Hip ABduction 1/5   R Hip ADduction 1/5   R Knee Flexion 2-/5   R Knee Extension 2-/5   R Ankle Dorsiflexion 0/5   R Ankle Plantar Flexion 1/5   LLE Assessment   LLE Assessment WFL   Coordination   Movements are Fluid and Coordinated 0   Coordination and Movement Description Limited RLE voluntary movement  Mild RUE dysmetria   Sensation WFL   Light Touch   RLE Light Touch Grossly intact   LLE Light Touch Grossly intact   Bed Mobility   Supine to Sit 3  Moderate assistance   Additional items Assist x 2; Increased time required;Verbal cues;LE management;HOB elevated   Sit to Supine 3  Moderate assistance   Additional items Assist x 2; Increased time required;LE management;Verbal cues;HOB elevated   Additional Comments Pt required mod A x 2 for UB support and RLE mangement during bed mobility  HOB elevated for ease  Tolerated sitting EOB for approx 8 minutes, required close supervision for static sitting balance  Required mod A x 1 to scoot towards EOB>    Transfers   Sit to Stand 3  Moderate assistance   Additional items Assist x 2; Increased time required;Verbal cues  (R knee blocked)   Stand to Sit 3  Moderate assistance   Additional items Assist x 2; Increased time required;Verbal cues   Stand pivot 2  Maximal assistance   Additional items Assist x 2; Increased time required;Verbal cues   Additional Comments Required mod A x 2 for moderate force production and R knee blocked to prevent knee buckling  During stand pivot transfer, required max A x 2 to maintain balance and assistance to move RLE off the ground  Unable to actively flex R hip and knee to initiate a step with RLE  Able to initiate step with LLE only  Required VC for sequencing   Ambulation/Elevation   Gait pattern R Hemiparesis; Improper Weight shift;R Knee Cecilia; Shuffling;Decreased R stance;Decreased foot clearance; Step to;Ataxia; Short stride; Foward flexed  (Unable to actively flex RLE off the floor)   Gait Assistance 2  Maximal assist   Additional items Assist x 2;Verbal cues   Assistive Device Rolling walker   Distance 3 feet from bed to chair   Balance   Static Sitting Fair -   Dynamic Sitting Poor +   Static Standing Poor   Dynamic Standing Poor   Ambulatory Poor -   Endurance Deficit   Endurance Deficit Yes   Endurance Deficit Description RLE weakness   Activity Tolerance   Activity Tolerance Patient limited by fatigue  (Limited by paresis)   Medical Staff Made Aware JUDITH Mireles   Nurse Made Aware Yes, HERON Moser    Assessment   Prognosis Guarded   Problem List Decreased strength;Decreased endurance;Decreased range of motion; Impaired balance;Decreased mobility; Decreased coordination; Impaired judgement;Decreased safety awareness   Assessment Pt is 61 y o  male seen for high complexity PT evaluation s/p admission to [facility] on 3/13/20 with acute onset of right sided weakness and fall due to flaccid right LE  tPA (-)  Comorbidities affecting pt's physical performance at time of assessment include: PAF, h/o aortic aneurysm repair, hyperlipidemia, history of CVA, class 2 obesity, and essential HTN  CTA (-) for acute intracranial process  PTA, pt independent with all functional mobility, ADLs, and IADLs without DME  Pt resides with sister in a [address] with 6 [address]LD[address] and FF to 2nd floor bed/bathroom  Decreased RLE strength compared to LLE, minimal visible voluntary movement in RLE but noted palpable contraction in most muscle groups  Sensation symmetrical in both LE  Required max A x 2 for stand pivot transfer, required physical assistance to move RLE to initiate step  Patient's decreased mobility level and increased fall risk is secondary to deficits in RLE weakness, RLE AROM, coordination, cognition, safety, judgement, gait, lucien, standing/ambulatory balance, trunk control, self-limiting behavior?, activity tolerance, and endurance  Current clinical presentation is unstable/unpredictable seen in pt's presentation of ongoing medical management, increased reliance on assistance compared to PLOF, impaired judgement/safety awareness, and significant PMH  Pt to benefit from continued PT tx to address deficits as defined above and maximize level of functional independent mobility and consistency  From PT/mobility standpoint, recommendation at time of d/c would be STR pending progress in order to facilitate return to PLOF  Barriers to Discharge Decreased caregiver support; Inaccessible home environment   Goals   Patient Goals To be independent   STG Expiration Date 03/27/20   Short Term Goal #1 In 1-2 weeks, the patient will complete the following 1) Perform all aspect of bed mobility with min A x 12) Perform functional transfer with LRAD and mod A x 1  3) Ambulate >10 feet with LRAD and mod A x 2  4) Increase standing balance to 3 minutes  5) Patient will improve dynamic standing balance from poor + to fair + in order to perform everyday activities  6) Patient will continue with ongoing rehab services for family education, DME, and D/C planning  7) Patient will demonstrate the ability to lift RLE against gravity without physical assistance or compensation to put on pants  Plan   Treatment/Interventions Functional transfer training;LE strengthening/ROM; Therapeutic exercise; Endurance training;Patient/family training;Bed mobility;Gait training   Recommendation   Recommendation Post acute IP rehab   Equipment Recommended Wheelchair;Walker   PT - OK to Discharge Yes  (to rehab once medically stable)   Modified Lexi Scale   Modified Lexi Scale 4   Barthel Index   Feeding 5   Bathing 0   Grooming Score 0   Dressing Score 5   Bladder Score 10   Bowels Score 10   Toilet Use Score 5   Transfers (Bed/Chair) Score 5   Mobility (Level Surface) Score 0   Stairs Score 0   Barthel Index Score 40     PT Treatment Note    Time in:9:10 am  Time out: 9:18 am  Total time: 8 minutes    S: Concluded PT evaluation with patient seated OOB however, required by RN to return back to bed due to medical testing  O:  Education performed on principles of neuroplasticty especially "use it or lose"  Educated on the importance to attempt to move RUE and RLE when not participating in therapy and not to neglect RLE and RUE  Also educated on the importance to attempted to use RLE and RUE during all functional tasks including ADLs  Transfers: Stand pivot transfers with max A x 2; required VC for sequencing and proper technique  Physical assistance to move RLE  A: Pt verbalized understanding of all education   Requires max A x 2 for poor - balance and limited voluntary movement in RLE during stand pivot transfers  Recommend to continue to transfer towards the stronger side due to safety concerns  P:PT to continue to follow   Plan to progress ambulation, elevations, functional transfers, there-ex, and balance exercises      Michi Villasenor PT, DPT

## 2020-03-13 NOTE — PROGRESS NOTES
Progress Note - Emelina Stewart 1960, 61 y o  male MRN: [id number]    Unit/Bed#: [facility] 706-01 Encounter: [id number]    Primary Care Provider: Paula Salinas MD   Date and time admitted to hospital: 3/12/2020 11:24 PM        History of CVA (cerebrovascular accident)  Assessment & Plan  Deemed to be embolic event with negative extensive workup in the past  On Xarelto, statin  Management as above    Class 2 obesity due to excess calories in adult  Assessment & Plan  Low-calorie diet    Hyperlipidemia  Assessment & Plan  On statin    PAF (paroxysmal atrial fibrillation) ([facility] [address] )  Assessment & Plan  On beta-blocker and Xarelto    Essential hypertension  Assessment & Plan  Continue amlodipine and beta-blocker with holding parameters  Admit to telemetry    H/O aortic aneurysm repair  Assessment & Plan  Continue to follow-up with thoracic surgeon as needed    * Stroke-like symptoms  Assessment & Plan  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   Moderate narrowing of the anterior M2 branch on the right at the origin  Moderate narrowing of the distal P1 segment on the right and mild narrowing of the proximal P1 segment on the left  No occlusion or severe stenosis of the major arteries of the head and neck seen  No stenosis within either internal carotid artery  Patent bilateral vertebral arteries "  Neurology on-call immediately responded, due to current Xarelto use patient took dose at 5 p m  he is t not  candidate for tPA  Due to persistent neurologic deficit he will be admitted on stroke pathway  Neurochecks according to protocol  Fall and aspiration precautions  Official speech and swallow evaluation, keep NPO until a m    Gentle overnight IV hydration  Echocardiogram ,MRI in a m   PT OT rehab evaluation  consult  Admit to telemetry  Obtain fasting lipid panel hemoglobin A1c  Obtain inflammatory markers  Continue statin Xarelto  Official neurology evaluation in a m  VTE Pharmacologic Prophylaxis:   Pharmacologic: Heparin  Mechanical VTE Prophylaxis in Place: Yes    Patient Centered Rounds: I have performed bedside rounds with nursing staff today  Discussions with Specialists or Other Care Team Provider: neurology    Education and Discussions with Family / Patient: patient     Time Spent for Care: 45 minutes  More than 50% of total time spent on counseling and coordination of care as described above  Current Length of Stay: 0 day(s)    Current Patient Status: Inpatient   Certification Statement: The patient will continue to require additional inpatient hospital stay due to stroke like symptoms    Discharge Plan: pending MRI results    Code Status: Level 1 - Full Code      Subjective:   Patient has no questions today  Objective:     Vitals:   Temp (24hrs), Av 4 °F (36 9 °C), Min:98 3 °F (36 8 °C), Max:98 5 °F (36 9 °C)    Temp:  [98 3 °F (36 8 °C)-98 5 °F (36 9 °C)] 98 5 °F (36 9 °C)  HR:  [63-96] 66  Resp:  [13-20] 17  BP: (120-194)/(74-99) 120/74  SpO2:  [95 %-100 %] 97 %  Body mass index is 37 78 kg/m²  Input and Output Summary (last 24 hours):        Intake/Output Summary (Last 24 hours) at 3/13/2020 1714  Last data filed at 3/13/2020 1618  Gross per 24 hour   Intake 963 75 ml   Output 1125 ml   Net -161 25 ml       Physical Exam:     Physical Exam    Additional Data:     Labs:    Results from last 7 days   Lab Units 20  0558   WBC Thousand/uL 8 10   HEMOGLOBIN g/dL 14 9   HEMATOCRIT % 43 2   PLATELETS Thousands/uL 174     Results from last 7 days   Lab Units 20  0558   POTASSIUM mmol/L 3 4*   CHLORIDE mmol/L 106   CO2 mmol/L 29   BUN mg/dL 10   CREATININE mg/dL 0 97   CALCIUM mg/dL 8 7     Results from last 7 days   Lab Units 03/12/20  2332   INR  1 80*       * I Have Reviewed All Lab Data Listed Above  * Additional Pertinent Lab Tests Reviewed: Nel 66 Admission Reviewed    Imaging:    Imaging Reports Reviewed Today Include:  CT head   Imaging Personally Reviewed by Myself Includes:   CT head    Recent Cultures (last 7 days):           Last 24 Hours Medication List:     Current Facility-Administered Medications:  amLODIPine 5 mg Oral Daily Boaz Smith MD   atorvastatin 40 mg Oral Daily With Batsheva Arizmendi MD   cholecalciferol 1,000 Units Oral Daily Boaz Smith MD   docusate sodium 100 mg Oral BID Boaz Smith MD   metoprolol succinate 150 mg Oral Daily Boaz Smith MD   ondansetron 4 mg Intravenous Q6H PRN oBaz Smith MD   pantoprazole 40 mg Oral Early Morning Boaz Smith MD   rivaroxaban 20 mg Oral Daily With Breakfast Boaz Smith MD   senna 1 tablet Oral Daily Boaz Smith MD        Today, Patient Was Seen By: Giovanny Connolly MD    ** Please Note: Dictation voice to text software may have been used in the creation of this document   **

## 2020-03-13 NOTE — ASSESSMENT & PLAN NOTE
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   Moderate narrowing of the anterior M2 branch on the right at the origin  Moderate narrowing of the distal P1 segment on the right and mild narrowing of the proximal P1 segment on the left  No occlusion or severe stenosis of the major arteries of the head and neck seen  No stenosis within either internal carotid artery  Patent bilateral vertebral arteries "  Neurology on-call immediately responded, due to current Xarelto use patient took dose at 5 p m  he is t not  candidate for tPA  Due to persistent neurologic deficit he will be admitted on stroke pathway  Neurochecks according to protocol  Fall and aspiration precautions  Official speech and swallow evaluation, keep NPO until a m  Gentle overnight IV hydration  Echocardiogram ,MRI in a m   PT OT rehab evaluation  consult  Admit to telemetry  Obtain fasting lipid panel hemoglobin A1c  Obtain inflammatory markers  Continue statin Xarelto  Official neurology evaluation in a m

## 2020-03-13 NOTE — CONSULTS
Consultation - Neurology   Berna Longo 61 y o  male MRN: [id number]  Unit/Bed#: [facility] 706-01 Encounter: [id number]    Assessment/Plan   70-year-old male with past medical history as listed below, the patient does have a history of CKD, hypertension, hyperlipidemia, stroke and TIA  Patient does follow with Neurology as an outpatient as well as Cardiology, please see details below for encounters, he had stroke workup back in 2017 for transient left facial, arm and thigh numbness with MRI negative for acute stroke at that time, he was treated as a TIA  He did have a history of atrial fibrillation, reports the patient does have a history of PAF noted postop 2014 without objective recurrence, he has been seen by cardiology and anticoagulation was not started - secondary to no objective reoccurrence  He had an MRI of brain completed on 1/2/20 which did reveal subacute infarcts of the white matter of the right posterior frontal convexity, the patient was started on Xarelto by cardiology at that time  Neurology was asked to see for stroke like episode, the patient presented with acute right sided weakness, CT of head, CTA showed no acute intracranial process is seen, there was  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, moderate narrowing of the anterior branch of the right M2, moderate narrowing of the distal P1 segment on the right and mild narrowing of the proximal P1 segment on the left  No occlusion or severe stenosis of the major arteries of the head and neck seen  No stenosis within either internal carotid artery      · Acute right-sided weakness - suspected ischemic stroke - etiology most likely cardio embolic with hx of afib, need to rule out other etiologies - aortic arch atheroma  · Subacute infarcts seen on MRI on 1/20 - right posterior frontal convexity  · Hx of PAF post op 2014, with no subjective reoccurrence - recently started on Xarelto  · Intracranial ascvd disease as stated above - by CTA of head and neck - please see above  · HTN  · Hyperlipidemia  · Obesity  · Hx of aortic aneurysm repair     - Stroke pathway   MRI brain with out contrast   Echo   Lipid Panel   Hemoglobin A1c   On Aspirin 81 mg, on Xarelto   Atorvastatin 40 mg   Permissive HTN for now   Continue telemetry, may need to consider loop monitor - consider cardiology consultation   PT/OT/ST   Frequent neuro checks  Continue to monitor and notify neurology with any changes  - Medical management and supportive care per primary team  Correction of any metabolic or infectious disturbances  History of Present Illness     Reason for Consult / Principal Problem: Stroke like episode    HPI: Ramy Mccabe is a 61 y o  male with past medical history of aneurysm of the thoracic aorta, cardiac disease, CKD, GERD, hyperlipidemia, hypertension, kidney stones, irritable bowel syndrome, stroke and TIA  The patient has been seen by Neurology in the past see below for details  The patient also does have a history of atrial fibrillation and follows with cardiology, on Xarelto  The patient presented as a stroke alert for acute right-sided weakness, the patient went to bed around 9:30 p m  Approximately 1 hour later he attempted to stand up and fell down due to flaccid right lower extremity weakness, he was evaluated in the ED and was noted to have an NIH of a 6  CT of the head revealed no acute intracranial pathology, CTA of head and neck as below, showed no LVO  The patient was not a tpa candidate - secondary to being on Xarelto  The patient was admitted on the stroke pathway, he was evaluated by the medicine team   Neurology was asked to see in regards to stroke      Please see records below, recent MRI of brain was completed on 1/2/20 which did reveal subacute infarcts of the white matter of the right posterior frontal convexity, the patient was started on Xarelto by cardiology with his hx of afib, but no subjective reoccurrence  The patient reports that last night he went to bed at around 9:30 a m , at approximately 1:00 a m  Later he woken up and he could not move his right side, came to the ED for evaluation  He reports he also is complaining of some loss of vision in his left eye, which has returned  He reports that his weakness in his right leg wax wane overnight but now he has little movement  He denies any other neurological symptoms or symptoms  He denies headache, chest pain, shortness of breath, occasional palpitations, nausea or vomiting, current problems with vision, meningismus, or fever  Otherwise 12 point review of symptoms are negative  Inpatient consult to Neurology  Consult performed by: Lilyan Dance, PA-C  Consult ordered by: Michael Vasquez MD          Review of Systems   Constitutional: Positive for fatigue  Negative for chills, diaphoresis and fever  HENT: Negative  Eyes: Negative  Respiratory: Negative  Cardiovascular: Positive for palpitations  Gastrointestinal: Negative  Endocrine: Negative  Genitourinary: Negative  Musculoskeletal: Positive for gait problem and neck stiffness  Skin: Negative for color change, pallor, rash and wound  Neurological: Positive for weakness and numbness  Negative for dizziness, tremors, seizures, syncope, facial asymmetry, speech difficulty, light-headedness and headaches  Hematological: Negative  Psychiatric/Behavioral: Negative        Historical Information   Past Medical History:   Diagnosis Date    Aneurysm of thoracic aorta ([facility] [address] )     last assessed 11/20/17    Anxiety     currently on no meds    Cardiac disease     Cholelithiasis     Chronic kidney disease     GERD (gastroesophageal reflux disease)     Headache due to anesthesia     Hyperlipidemia     Hypertension     Irritable bowel syndrome     Kidney stone     Palpitations     PONV (postoperative nausea and vomiting)     Shortness of breath     ? related to acid reflux    Sleep apnea     not sure    Stroke [facility]) 11/2014    TIA (transient ischemic attack)      Past Surgical History:   Procedure Laterality Date    AORTA SURGERY      thoracic - aneurysmorrhaphy    APPENDECTOMY      ASCENDING AORTIC ANEURYSM REPAIR      resolved 8/19/15    CHOLECYSTECTOMY      laparoscopic    CYSTOSCOPY      with removal of object- stent removal    KNEE ARTHROSCOPY Right 1996    with medial meniscus repair    LITHOTRIPSY      renal    AK FRAGMENT KIDNEY STONE/ ESWL Left 5/17/2018    Procedure: LITHROTRIPSY EXTRACORPORAL SHOCKWAVE (ESWL); Surgeon: Serafin Zhu MD;  Location: [facility];  Service: Urology    THUMB ARTHROSCOPY Right 1976    ligament repair     Social History   Social History     Substance and Sexual Activity   Alcohol Use Not Currently     Social History     Substance and Sexual Activity   Drug Use No     E-Cigarette/Vaping     E-Cigarette/Vaping Substances     Social History     Tobacco Use   Smoking Status Never Smoker   Smokeless Tobacco Never Used   Tobacco Comment    no second hand smoke exposure     Family History:   Family History   Problem Relation Age of Onset    Diverticulitis Mother         of colon    Nephrolithiasis Mother     Emphysema Father     Nephrolithiasis Sister     Heart attack Maternal Grandfather 72    Breast cancer Paternal Grandmother     Lung cancer Paternal Grandfather     Cancer Family     Coronary artery disease Family     Diabetes Family         sibling    Hypertension Family         sibling    Hernia Family         sibling     Review of previous medical records was completed and reviewed  The patient was seen by Neurology in the past for left facial numbness, it was reported in 2017 patient had transient numbness and tingling sensation in his left face arm and thigh which has been present since his aneurysm repair 3 years ago    The patient had extensive workup including MRI of the brain MRI of the cervical spine echo and carotid Dopplers which were completed and did not review underlying cause  At that time he did have a history of atrial fibrillation and he was following with Cardiology, and was not started on anticoagulation given no recurrence  He was on aspirin, with secondary risk factor modification  The patient has been followed by Cardiology since that time, the last no completed by Cardiology on 02/05/2020 - reports the patient does have a history of PAF noted postop 2014 without objective recurrence  The patient did have an MRI of the brain completed on 1/2/20 - revealed several subacute lacunar infarction involving the subcortical white matter right posterior frontal convexity, not previously evident and moderate chronic precocious microvascular disease, he was started on Xarelto by cardiology - no reported objective reoccurence of afib, by cardiology  Meds/Allergies   all current active meds have been reviewed, current meds:   Current Facility-Administered Medications   Medication Dose Route Frequency    amLODIPine (NORVASC) tablet 5 mg  5 mg Oral Daily    aspirin chewable tablet 81 mg  81 mg Oral Daily    atorvastatin (LIPITOR) tablet 40 mg  40 mg Oral Daily With Dinner    cholecalciferol (VITAMIN D3) tablet 1,000 Units  1,000 Units Oral Daily    docusate sodium (COLACE) capsule 100 mg  100 mg Oral BID    metoprolol succinate (TOPROL-XL) 24 hr tablet 150 mg  150 mg Oral Daily    ondansetron (ZOFRAN) injection 4 mg  4 mg Intravenous Q6H PRN    pantoprazole (PROTONIX) EC tablet 40 mg  40 mg Oral Early Morning    rivaroxaban (XARELTO) tablet 20 mg  20 mg Oral Daily With Breakfast    senna (SENOKOT) tablet 8 6 mg  1 tablet Oral Daily    sodium chloride 0 9 % infusion  75 mL/hr Intravenous Continuous    and PTA meds:   Prior to Admission Medications   Prescriptions Last Dose Informant Patient Reported? Taking? amLODIPine (NORVASC) 5 mg tablet 3/12/2020 at Unknown time  No Yes   Sig: TAKE 1 TABLET BY MOUTH EVERY DAY   amoxicillin (AMOXIL) 500 mg capsule Not Taking at Unknown time Self Yes No   Sig: TAKE 4 CAPSULES 1 HOUR PRIOR TO APPOINTMENT   cholecalciferol (VITAMIN D3) 1,000 units tablet  Self No Yes   Sig: Take 1 tablet (1,000 Units total) by mouth daily   metoprolol succinate (TOPROL-XL) 50 mg 24 hr tablet 3/12/2020 at Unknown time Self No Yes   Sig: Take 3 tablets (150 mg total) by mouth daily   pantoprazole (PROTONIX) 40 mg tablet 3/12/2020 at Unknown time  No Yes   Sig: TAKE 1 TABLET (40 MG TOTAL) BY MOUTH DAILY   rivaroxaban (XARELTO) 20 mg tablet 3/12/2020 at Unknown time Self No Yes   Sig: Take 1 tablet (20 mg total) by mouth daily with breakfast   rosuvastatin (CRESTOR) 20 MG tablet 3/12/2020 at Unknown time Self No Yes   Sig: TAKE 1 TABLET BY MOUTH EVERY DAY      Facility-Administered Medications: None       Allergies   Allergen Reactions    Losartan Angioedema    Bactrim [Sulfamethoxazole-Trimethoprim]     Lisinopril      Felt bad, was OK with Zestril brand name   Tramadol        Objective   Vitals:Blood pressure 149/86, pulse 71, temperature 98 3 °F (36 8 °C), resp  rate 16, height 5' 8" (1 727 m), weight 113 kg (248 lb 7 3 oz), SpO2 95 %  ,Body mass index is 37 78 kg/m²  Intake/Output Summary (Last 24 hours) at 3/13/2020 0714  Last data filed at 3/13/2020 0655  Gross per 24 hour   Intake 260 ml   Output 1025 ml   Net -765 ml     Invasive Devices: Invasive Devices     Peripheral Intravenous Line            Peripheral IV 03/12/20 Left Hand less than 1 day    Peripheral IV 03/12/20 Right Antecubital less than 1 day              Physical Exam   Constitutional: He is oriented to person, place, and time  He appears well-developed  No distress  HENT:   Head: Normocephalic and atraumatic  Eyes: Pupils are equal, round, and reactive to light  EOM are normal  Right eye exhibits no discharge   Left eye exhibits no discharge  Neck: Neck supple  Cardiovascular: Normal rate  Pulmonary/Chest: Effort normal  No respiratory distress  Abdominal: He exhibits no distension  Musculoskeletal: He exhibits no edema or deformity  Neurological: He is alert and oriented to person, place, and time  He has an abnormal Finger-Nose-Finger Test (unable secondary to weakness on the right, left no ataxia)  Heel-to-shin test: unable secondary to weakness on the right  Reflex Scores:       Tricep reflexes are 1+ on the right side and 1+ on the left side  Bicep reflexes are 1+ on the right side and 1+ on the left side  Brachioradialis reflexes are 1+ on the right side and 1+ on the left side  Patellar reflexes are 1+ on the right side and 1+ on the left side  Achilles reflexes are 1+ on the right side and 1+ on the left side  Skin: Skin is warm and dry  He is not diaphoretic  Psychiatric: His speech is normal    Vitals reviewed  Neurologic Exam     Mental Status   Oriented to person, place, and time  Oriented to country, city and area  Follows 2 step commands  Attention: normal  Concentration: normal    Speech: speech is normal   Level of consciousness: alert  Knowledge: good  Able to perform simple calculations  Able to name object  Able to read  Able to repeat  Able to write  Normal comprehension  Cranial Nerves     CN II   Visual fields full to confrontation  CN III, IV, VI   Pupils are equal, round, and reactive to light  Extraocular motions are normal    Nystagmus: none   Diplopia: none  Ophthalmoparesis: none    CN V   Facial sensation intact  CN VII   Facial expression full, symmetric  CN VIII   CN VIII normal      CN IX, X   CN IX normal    CN X normal      CN XI   CN XI normal      CN XII   CN XII normal      Motor Exam   Muscle bulk: normal  Overall muscle tone: normal    Strength   Strength 5/5 except as noted   rightt upper distally 2/5 at the shoulders abduction / adduction, biceps and triceps - 4,  4  Right lower 1/5 proximally, distally 0/5, unable to wiggle toes  Sensory Exam   Light touch normal    No focal to light touch and temperature  Gait, Coordination, and Reflexes     Gait  Gait: (not tested)    Coordination   Finger to nose coordination: abnormal (unable secondary to weakness on the right, left no ataxia)  Heel-to-shin test: unable secondary to weakness on the right  Tremor   Resting tremor: absent  Intention tremor: absent  Action tremor: absent    Reflexes   Right brachioradialis: 1+  Left brachioradialis: 1+  Right biceps: 1+  Left biceps: 1+  Right triceps: 1+  Left triceps: 1+  Right patellar: 1+  Left patellar: 1+  Right achilles: 1+  Left achilles: 1+  Right plantar: equivocal  Left plantar: normalNo parkinson's features, no evidence of myelopathy  No evidence of seizure activity       Lab Results:     Procedure: Ct Stroke Alert Brain    Result Date: 3/13/2020  Narrative: CTA NECK AND BRAIN WITH AND WITHOUT CONTRAST INDICATION: STROKE ALERT flaccid right upper extremity  left-sided facial, arm, and thigh tingling COMPARISON:   MR brain dated 1/2/2020 TECHNIQUE:  Routine CT imaging of the Brain without contrast   Post contrast imaging was performed after administration of iodinated contrast through the neck and brain  Post contrast axial 0 625 mm images timed to opacify the arterial system  3D rendering was performed on an independent workstation  MIP reconstructions performed  Coronal reconstructions were performed of the noncontrast portion of the brain  Radiation dose length product (DLP) for this visit:  1009 71 mGy-cm  (accession [id number]), 455 mGy-cm  (accession [id number])  This examination, like all CT scans performed in the [facility], was performed utilizing techniques to minimize  radiation dose exposure, including the use of iterative reconstruction and automated exposure control  IV Contrast:  85 mL of iohexol (OMNIPAQUE)  IMAGE QUALITY:   Diagnostic FINDINGS: NONCONTRAST BRAIN PARENCHYMA: Decreased attenuation is noted in periventricular and subcortical white matter demonstrating an appearance that is statistically most likely to represent mild microangiopathic change  No CT signs of acute territorial infarction  Small old lacunar infarct in the right frontal subcortical white matter  No intracranial mass, mass effect or midline shift  No acute parenchymal hemorrhage  Gray-white differentiation appears maintained  Mineralization of the right basal ganglia VENTRICLES AND EXTRA-AXIAL SPACES:  Normal for the patient's age  VISUALIZED ORBITS AND PARANASAL SINUSES:  Mild mucosal thickening in the bilateral maxillary and ethmoid sinuses  Opacification of multiple bilateral ethmoid air cells  Large polyp/mucous retention cyst in the right sphenoid sinus  Paranasal sinuses otherwise grossly clear  The orbits appear intact  CERVICAL VASCULATURE AORTIC ARCH AND GREAT VESSELS:  Normal aortic arch and great vessel origins  Normal visualized subclavian vessels  RIGHT VERTEBRAL ARTERY CERVICAL SEGMENT:  Normal origin  The vessel is normal in caliber throughout the neck  LEFT VERTEBRAL ARTERY CERVICAL SEGMENT:  Normal origin  The vessel is normal in caliber throughout the neck  RIGHT EXTRACRANIAL CAROTID SEGMENT:  Normal caliber common carotid artery  Normal bifurcation and cervical internal carotid artery  No stenosis or dissection  LEFT EXTRACRANIAL CAROTID SEGMENT:  Normal caliber common carotid artery  Normal bifurcation and cervical internal carotid artery  No stenosis or dissection  NASCET criteria was used to determine the degree of internal carotid artery diameter stenosis   INTRACRANIAL VASCULATURE INTERNAL CAROTID ARTERIES:  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 (axial image 171, series 6 and sagittal image 55, series 602)   Normal enhancement of the intracranial portions of the internal  carotid arteries otherwise  Normal ophthalmic artery origins  Normal ICA terminus  ANTERIOR CIRCULATION:  Mildly hypoplastic right A1 segment  A1 segments and anterior cerebral arteries with normal enhancement  Normal anterior communicating artery  MIDDLE CEREBRAL ARTERY CIRCULATION: Moderate narrowing of the anterior M2 branch on the right at the origin (axial image 169, series 6)  M1 segment and middle cerebral artery branches demonstrate normal enhancement bilaterally otherwise  DISTAL VERTEBRAL ARTERIES:  Normal distal vertebral arteries  Posterior inferior cerebellar artery origins are normal  Normal vertebral basilar junction  BASILAR ARTERY:  Basilar artery is normal in caliber  Normal superior cerebellar arteries  POSTERIOR CEREBRAL ARTERIES: Both posterior cerebral arteries arises from the basilar tip  Moderate narrowing of the distal P1 segment on the right and mild narrowing of the proximal P1 segment on the left  The posterior circulation remains patent  DURAL VENOUS SINUSES:  Patent NON VASCULAR ANATOMY BONY STRUCTURES:  No acute osseous abnormality  SOFT TISSUES OF THE NECK:  Normal  THORACIC INLET:  Grossly unremarkable     Impression: No acute intracranial process is seen  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 (axial image 171, series 6 and sagittal image 55, series 602)  Moderate narrowing of the anterior M2 branch on the right at the origin (axial image 169, series 6)  Moderate narrowing of the distal P1 segment on the right and mild narrowing of the proximal P1 segment on the left  No occlusion or severe stenosis of the major arteries of the head and neck seen  No stenosis within either internal carotid artery  Patent bilateral vertebral arteries  Sinus disease as described  Other findings as above   Findings discussed with Dr Lamont Calvin by Dr Gustavo Hernandez at 12:10 AM on 3/13/2020  Workstation performed: [id number]     Procedure: Cta Stroke Alert (head/neck)    Result Date: 3/13/2020  Narrative: CTA NECK AND BRAIN WITH AND WITHOUT CONTRAST INDICATION: STROKE ALERT flaccid right upper extremity  left-sided facial, arm, and thigh tingling COMPARISON:   MR brain dated 1/2/2020 TECHNIQUE:  Routine CT imaging of the Brain without contrast   Post contrast imaging was performed after administration of iodinated contrast through the neck and brain  Post contrast axial 0 625 mm images timed to opacify the arterial system  3D rendering was performed on an independent workstation  MIP reconstructions performed  Coronal reconstructions were performed of the noncontrast portion of the brain  Radiation dose length product (DLP) for this visit:  1009 71 mGy-cm  (accession [id number]), 455 mGy-cm  (accession [id number])  This examination, like all CT scans performed in the [facility], was performed utilizing techniques to minimize  radiation dose exposure, including the use of iterative reconstruction and automated exposure control  IV Contrast:  85 mL of iohexol (OMNIPAQUE)  IMAGE QUALITY:   Diagnostic FINDINGS: NONCONTRAST BRAIN PARENCHYMA: Decreased attenuation is noted in periventricular and subcortical white matter demonstrating an appearance that is statistically most likely to represent mild microangiopathic change  No CT signs of acute territorial infarction  Small old lacunar infarct in the right frontal subcortical white matter  No intracranial mass, mass effect or midline shift  No acute parenchymal hemorrhage  Gray-white differentiation appears maintained  Mineralization of the right basal ganglia VENTRICLES AND EXTRA-AXIAL SPACES:  Normal for the patient's age  VISUALIZED ORBITS AND PARANASAL SINUSES:  Mild mucosal thickening in the bilateral maxillary and ethmoid sinuses  Opacification of multiple bilateral ethmoid air cells    Large polyp/mucous retention cyst in the right sphenoid sinus  Paranasal sinuses otherwise grossly clear  The orbits appear intact  CERVICAL VASCULATURE AORTIC ARCH AND GREAT VESSELS:  Normal aortic arch and great vessel origins  Normal visualized subclavian vessels  RIGHT VERTEBRAL ARTERY CERVICAL SEGMENT:  Normal origin  The vessel is normal in caliber throughout the neck  LEFT VERTEBRAL ARTERY CERVICAL SEGMENT:  Normal origin  The vessel is normal in caliber throughout the neck  RIGHT EXTRACRANIAL CAROTID SEGMENT:  Normal caliber common carotid artery  Normal bifurcation and cervical internal carotid artery  No stenosis or dissection  LEFT EXTRACRANIAL CAROTID SEGMENT:  Normal caliber common carotid artery  Normal bifurcation and cervical internal carotid artery  No stenosis or dissection  NASCET criteria was used to determine the degree of internal carotid artery diameter stenosis  INTRACRANIAL VASCULATURE INTERNAL CAROTID ARTERIES:  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 (axial image 171, series 6 and sagittal image 55, series 602)  Normal enhancement of the intracranial portions of the internal  carotid arteries otherwise  Normal ophthalmic artery origins  Normal ICA terminus  ANTERIOR CIRCULATION:  Mildly hypoplastic right A1 segment  A1 segments and anterior cerebral arteries with normal enhancement  Normal anterior communicating artery  MIDDLE CEREBRAL ARTERY CIRCULATION: Moderate narrowing of the anterior M2 branch on the right at the origin (axial image 169, series 6)  M1 segment and middle cerebral artery branches demonstrate normal enhancement bilaterally otherwise  DISTAL VERTEBRAL ARTERIES:  Normal distal vertebral arteries  Posterior inferior cerebellar artery origins are normal  Normal vertebral basilar junction  BASILAR ARTERY:  Basilar artery is normal in caliber  Normal superior cerebellar arteries   POSTERIOR CEREBRAL ARTERIES: Both posterior cerebral arteries arises from the basilar tip  Moderate narrowing of the distal P1 segment on the right and mild narrowing of the proximal P1 segment on the left  The posterior circulation remains patent  DURAL VENOUS SINUSES:  Patent NON VASCULAR ANATOMY BONY STRUCTURES:  No acute osseous abnormality  SOFT TISSUES OF THE NECK:  Normal  THORACIC INLET:  Grossly unremarkable     Impression: No acute intracranial process is seen  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 (axial image 171, series 6 and sagittal image 55, series 602)  Moderate narrowing of the anterior M2 branch on the right at the origin (axial image 169, series 6)  Moderate narrowing of the distal P1 segment on the right and mild narrowing of the proximal P1 segment on the left  No occlusion or severe stenosis of the major arteries of the head and neck seen  No stenosis within either internal carotid artery  Patent bilateral vertebral arteries  Sinus disease as described  Other findings as above  Findings discussed with Dr Erma Sommers by Dr Brittnee Tam at 12:10 AM on 3/13/2020   Workstation performed: [id number]     Recent Results (from the past 24 hour(s))   Fingerstick Glucose (POCT)    Collection Time: 03/12/20 11:30 PM   Result Value Ref Range    POC Glucose 120 65 - 140 mg/dl   Basic metabolic panel    Collection Time: 03/12/20 11:32 PM   Result Value Ref Range    Sodium 138 136 - 145 mmol/L    Potassium 3 5 3 5 - 5 3 mmol/L    Chloride 106 100 - 108 mmol/L    CO2 29 21 - 32 mmol/L    ANION GAP 3 (L) 4 - 13 mmol/L    BUN 13 5 - 25 mg/dL    Creatinine 1 07 0 60 - 1 30 mg/dL    Glucose 129 65 - 140 mg/dL    Calcium 8 7 8 3 - 10 1 mg/dL    eGFR 75 ml/min/1 73sq m   CBC and Platelet    Collection Time: 03/12/20 11:32 PM   Result Value Ref Range    WBC 8 32 4 31 - 10 16 Thousand/uL    RBC 5 08 3 88 - 5 62 Million/uL    Hemoglobin 15 3 12 0 - 17 0 g/dL    Hematocrit 44 4 36 5 - 49 3 %    MCV 87 82 - 98 fL    MCH 30 1 26 8 - 34 3 pg    MCHC 34 5 31 4 - 37 4 g/dL    RDW 13 0 11 6 - 15 1 %    Platelets 139 894 - 160 Thousands/uL    MPV 9 7 8 9 - 12 7 fL   Protime-INR    Collection Time: 03/12/20 11:32 PM   Result Value Ref Range    Protime 20 4 (H) 11 6 - 14 5 seconds    INR 1 80 (H) 0 84 - 1 19   APTT    Collection Time: 03/12/20 11:32 PM   Result Value Ref Range    PTT 37 23 - 37 seconds   Troponin I    Collection Time: 03/12/20 11:32 PM   Result Value Ref Range    Troponin I <0 02 <=0 04 ng/mL   C-reactive protein    Collection Time: 03/13/20  3:30 AM   Result Value Ref Range    CRP 4 6 (H) <3 0 mg/L   High sensitivity CRP    Collection Time: 03/13/20  3:30 AM   Result Value Ref Range    CRP, High Sensitivity 4 12 mg/L   Homocysteine, serum    Collection Time: 03/13/20  3:30 AM   Result Value Ref Range    Homocyst(e)ine, P/S 9 4 5 3 - 14 2 umol/L   CBC (With Platelets)    Collection Time: 03/13/20  5:58 AM   Result Value Ref Range    WBC 8 10 4 31 - 10 16 Thousand/uL    RBC 4 89 3 88 - 5 62 Million/uL    Hemoglobin 14 9 12 0 - 17 0 g/dL    Hematocrit 43 2 36 5 - 49 3 %    MCV 88 82 - 98 fL    MCH 30 5 26 8 - 34 3 pg    MCHC 34 5 31 4 - 37 4 g/dL    RDW 13 2 11 6 - 15 1 %    Platelets 717 509 - 415 Thousands/uL    MPV 10 2 8 9 - 12 7 fL     Imaging Studies: I have personally reviewed pertinent reports        Code Status: Level 1 - Full Code

## 2020-03-13 NOTE — QUICK NOTE
Najma Blair 61 y o  male MRN: [id number]  Unit/Bed#: [facility]PHP 706-01 Encounter: [id number]        Assessment/Plan   Will be admitted to the hospital for stroke workup      Physician Requesting Consult: Samuel Zendejas MD  Reason for Consult / Principal Problem: Stroke Alert  Hx and PE limited by:  None  Patient last known well:  Around 11 p m  Stroke alert called:  10:22 p m  Neurology time of arrival/evaluation:  10:23 p m  TPA Decision: Patient not a TPA candidate  Patient currently on Xarelto        HPI: Najma Blair is a 61y o  year old (handedness not determinable) male who presents with flaccid right upper lower extremity  From chart review:  Seen by our physician assistant in 2017:    - "Patient describes a transient numbness and tingling sensation in the left face, arm and thigh which has been present since his aneurysm repair 3 years ago   He had a very extensive workup include MRI brain, MRI cervical spine, ECHO, cartoid doppler and labs which have all been negative for an underlying cause "    - history of thoracic aortic aneurysm repair  - Bicuspid aortic valve  - Hypertension  - Hyperlipidemia  - Left-sided paresthesias      Historical Information   Past Medical History:   Diagnosis Date    Aneurysm of thoracic aorta ([address] )     last assessed 11/20/17    Anxiety     currently on no meds    Cardiac disease     Cholelithiasis     Chronic kidney disease     GERD (gastroesophageal reflux disease)     Headache due to anesthesia     Hyperlipidemia     Hypertension     Irritable bowel syndrome     Kidney stone     Palpitations     PONV (postoperative nausea and vomiting)     Shortness of breath     ? related to acid reflux    Sleep apnea     not sure    Stroke [facility] 11/2014    TIA (transient ischemic attack)      Past Surgical History:   Procedure Laterality Date    AORTA SURGERY      thoracic - aneurysmorrhaphy    APPENDECTOMY      ASCENDING AORTIC ANEURYSM REPAIR resolved 8/19/15    CHOLECYSTECTOMY      laparoscopic    CYSTOSCOPY      with removal of object- stent removal    KNEE ARTHROSCOPY Right 1996    with medial meniscus repair    LITHOTRIPSY      renal    [place] FRAGMENT KIDNEY STONE/ ESWL Left 5/17/2018    Procedure: LITHROTRIPSY EXTRACORPORAL SHOCKWAVE (ESWL); Surgeon: Serafin Zhu MD;  Location: [facility];  Service: Urology    THUMB ARTHROSCOPY Right 1976    ligament repair       Objective   Vitals:Blood pressure 154/89, pulse 73, temperature 98 4 °F (36 9 °C), resp  rate 16, height 5' 8" (1 727 m), weight 113 kg (248 lb 7 3 oz), SpO2 97 %  ,Body mass index is 37 78 kg/m²  Lab Results: I have personally reviewed pertinent reports      Imaging Studies: I have personally reviewed pertinent films in PACS  EKG, Pathology, and Other Studies: I have personally reviewed pertinent films in PACS

## 2020-03-13 NOTE — ASSESSMENT & PLAN NOTE
Deemed to be embolic event with negative extensive workup in the past  On Xarelto, statin  Management as above

## 2020-03-13 NOTE — PLAN OF CARE
Problem: Potential for Falls  Goal: Patient will remain free of falls  Description  INTERVENTIONS:  - Assess patient frequently for physical needs  -  Identify cognitive and physical deficits and behaviors that affect risk of falls    -  [place] fall precautions as indicated by assessment   - Educate patient/family on patient safety including physical limitations  - Instruct patient to call for assistance with activity based on assessment  - Modify environment to reduce risk of injury  - Consider OT/PT consult to assist with strengthening/mobility  Outcome: Progressing     Problem: PAIN - ADULT  Goal: Verbalizes/displays adequate comfort level or baseline comfort level  Description  Interventions:  - Encourage patient to monitor pain and request assistance  - Assess pain using appropriate pain scale  - Administer analgesics based on type and severity of pain and evaluate response  - Implement non-pharmacological measures as appropriate and evaluate response  - Consider cultural and social influences on pain and pain management  - Notify physician/advanced practitioner if interventions unsuccessful or patient reports new pain  Outcome: Progressing     Problem: SAFETY ADULT  Goal: Maintain or return to baseline ADL function  Description  INTERVENTIONS:  -  Assess patient's ability to carry out ADLs; assess patient's baseline for ADL function and identify physical deficits which impact ability to perform ADLs (bathing, care of mouth/teeth, toileting, grooming, dressing, etc )  - Assess/evaluate cause of self-care deficits   - Assess range of motion  - Assess patient's mobility; develop plan if impaired  - Assess patient's need for assistive devices and provide as appropriate  - Encourage maximum independence but intervene and supervise when necessary  - Involve family in performance of ADLs  - Assess for home care needs following discharge   - Consider OT consult to assist with ADL evaluation and planning for discharge  - Provide patient education as appropriate  Outcome: Progressing  Goal: Maintain or return mobility status to optimal level  Description  INTERVENTIONS:  - Assess patient's baseline mobility status (ambulation, transfers, stairs, etc )    - Identify cognitive and physical deficits and behaviors that affect mobility  - Identify mobility aids required to assist with transfers and/or ambulation (gait belt, sit-to-stand, lift, walker, cane, etc )  - [place] fall precautions as indicated by assessment  - Record patient progress and toleration of activity level on Mobility SBAR; progress patient to next Phase/Stage  - Instruct patient to call for assistance with activity based on assessment  - Consider rehabilitation consult to assist with strengthening/weightbearing, etc   Outcome: Progressing     Problem: DISCHARGE PLANNING  Goal: Discharge to home or other facility with appropriate resources  Description  INTERVENTIONS:  - Identify barriers to discharge w/patient and caregiver  - Arrange for needed discharge resources and transportation as appropriate  - Identify discharge learning needs (meds, wound care, etc )  - Arrange for interpretive services to assist at discharge as needed  - Refer to Case Management Department for coordinating discharge planning if the patient needs post-hospital services based on physician/advanced practitioner order or complex needs related to functional status, cognitive ability, or social support system  Outcome: Progressing     Problem: Knowledge Deficit  Goal: Patient/family/caregiver demonstrates understanding of disease process, treatment plan, medications, and discharge instructions  Description  Complete learning assessment and assess knowledge base    Interventions:  - Provide teaching at level of understanding  - Provide teaching via preferred learning methods  Outcome: Progressing     Problem: NEUROSENSORY - ADULT  Goal: Achieves stable or improved neurological status  Description  INTERVENTIONS  - Monitor and report changes in neurological status  - Monitor vital signs such as temperature, blood pressure, glucose, and any other labs ordered   - Initiate measures to prevent increased intracranial pressure  - Monitor for seizure activity and implement precautions if appropriate      Outcome: Progressing  Goal: Achieves maximal functionality and self care  Description  INTERVENTIONS  - Monitor swallowing and airway patency with patient fatigue and changes in neurological status  - Encourage and assist patient to increase activity and self care     - Encourage visually impaired, hearing impaired and aphasic patients to use assistive/communication devices  Outcome: Progressing     Problem: MUSCULOSKELETAL - ADULT  Goal: Maintain or return mobility to safest level of function  Description  INTERVENTIONS:  - Assess patient's ability to carry out ADLs; assess patient's baseline for ADL function and identify physical deficits which impact ability to perform ADLs (bathing, care of mouth/teeth, toileting, grooming, dressing, etc )  - Assess/evaluate cause of self-care deficits   - Assess range of motion  - Assess patient's mobility  - Assess patient's need for assistive devices and provide as appropriate  - Encourage maximum independence but intervene and supervise when necessary  - Involve family in performance of ADLs  - Assess for home care needs following discharge   - Consider OT consult to assist with ADL evaluation and planning for discharge  - Provide patient education as appropriate  Outcome: Progressing

## 2020-03-13 NOTE — H&P
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   Moderate narrowing of the anterior M2 branch on the right at the origin  Moderate narrowing of the distal P1 segment on the right and mild narrowing of the proximal P1 segment on the left  No occlusion or severe stenosis of the major arteries of the head and neck seen  No stenosis within either internal carotid artery  Patent bilateral vertebral arteries "  Neurology on-call immediately responded, due to current Xarelto use patient took dose at 5 p m  he is t not  candidate for tPA  Due to persistent neurologic deficit he will be admitted on stroke pathway  Neurochecks according to protocol  Fall and aspiration precautions  Official speech and swallow evaluation, keep NPO until a m  Gentle overnight IV hydration  Echocardiogram ,MRI in a m   PT OT rehab evaluation  consult  Admit to telemetry  Obtain fasting lipid panel hemoglobin A1c  Obtain inflammatory markers  Continue statin Xarelto  Official neurology evaluation in a m      PAF (paroxysmal atrial fibrillation) (HCC)  Assessment & Plan  On beta-blocker and Xarelto    H/O aortic aneurysm repair  Assessment & Plan  Continue to follow-up with thoracic surgeon as needed    Hyperlipidemia  Assessment & Plan  On statin    History of CVA (cerebrovascular accident)  Assessment & Plan  Deemed to be embolic event with negative extensive workup in the past  On Xarelto, statin  Management as above    Class 2 obesity due to excess calories in adult  Assessment & Plan  Low-calorie diet    Essential hypertension  Assessment & Plan  Continue amlodipine and beta-blocker with holding parameters  Admit to telemetry      VTE Prophylaxis: Rivaroxaban (Xarelto)  / sequential compression device   Code Status: full  POLST: There is no POLST form on file for this patient (pre-hospital)  Discussion with family:  No family members at bedside    Anticipated Length of Stay:  Patient will be admitted on an Inpatient basis with an anticipated length of stay of  >2 midnights  Justification for Hospital Stay:  TIA/stroke workup    Total Time for Visit, including Counseling / Coordination of Care: 45 minutes  Greater than 50% of this total time spent on direct patient counseling and coordination of care  Chief Complaint:   Acute onset of right-sided weakness    History of Present Illness:    Boris Ascencio is a 61 y o  male with past medical history of ascending aortic aneurysm repair , PAF on Xarelto, previous TIA/stroke who presented from home as a stroke alert for evaluation of acute onset of right-sided weakness patient reports that he went to the bed around 9:30 p m  Approximately 1 hour later he attempted to stand up and fell down  due to flaccid right lower extremity  Upon initial presentation his NIH score was 6  CT of the head stroke alert and CTA head and neck did not reveal acute pathology dissection or severe stenosis however patient was not a candidate for tPA due to Xarelto taken by patient at 5 p m  Today  EKG shows sinus rhythm  Hemodynamically he remained stable  He admitted on stroke pathway for further evaluation of persistent neurologic deficit          Review of Systems:    Review of Systems   Neurological: Positive for weakness         Past Medical and Surgical History:     Past Medical History:   Diagnosis Date    Aneurysm of thoracic aorta ([address] )     last assessed 11/20/17    Anxiety currently on no meds    Cardiac disease     Cholelithiasis     Chronic kidney disease     GERD (gastroesophageal reflux disease)     Headache due to anesthesia     Hyperlipidemia     Hypertension     Irritable bowel syndrome     Kidney stone     Palpitations     PONV (postoperative nausea and vomiting)     Shortness of breath     ? related to acid reflux    Sleep apnea     not sure    [facility]roke [facility] 11/2014    TIA (transient ischemic attack)        Past Surgical History:   Procedure Laterality Date    AORTA SURGERY      thoracic - aneurysmorrhaphy    APPENDECTOMY      ASCENDING AORTIC ANEURYSM REPAIR      resolved 8/19/15    CHOLECYSTECTOMY      laparoscopic    CYSTOSCOPY      with removal of object- stent removal    KNEE ARTHROSCOPY Right 1996    with medial meniscus repair    LITHOTRIPSY      renal    [place] FRAGMENT KIDNEY STONE/ ESWL Left 5/17/2018    Procedure: LITHROTRIPSY EXTRACORPORAL SHOCKWAVE (ESWL); Surgeon: Norm Borges MD;  Location: [facility];  Service: Urology    THUMB ARTHROSCOPY Right 1976    ligament repair       Meds/Allergies:    Prior to Admission medications    Medication Sig Start Date End Date Taking?  Authorizing Provider   amLODIPine (NORVASC) 5 mg tablet TAKE 1 TABLET BY MOUTH EVERY DAY 2/10/20  Yes Erika Sanders PA-C   amoxicillin (AMOXIL) 500 mg capsule TAKE 4 CAPSULES 1 HOUR PRIOR TO APPOINTMENT 1/5/18  Yes Historical Provider, MD   cholecalciferol (VITAMIN D3) 1,000 units tablet Take 1 tablet (1,000 Units total) by mouth daily 1/31/20 1/30/21 Yes Maranda Jaime MD   metoprolol succinate (TOPROL-XL) 50 mg 24 hr tablet Take 3 tablets (150 mg total) by mouth daily 1/31/20 7/29/20 Yes Maranda Jaime MD   pantoprazole (PROTONIX) 40 mg tablet TAKE 1 TABLET (40 MG TOTAL) BY MOUTH DAILY 2/7/20 2/6/21 Yes Maranda Jaime MD   rivaroxaban (XARELTO) 20 mg tablet Take 1 tablet (20 mg total) by mouth daily with breakfast 12/26/19  Yes Maranda Jaime MD   rosuvastatin (CRESTOR) 20 MG tablet TAKE 1 TABLET BY MOUTH EVERY DAY 12/17/19  Yes Adele Espino MD     I have reviewed home medications with a medical source (PCP, Pharmacy, other)  Allergies: Allergies   Allergen Reactions    Losartan Angioedema    Bactrim [Sulfamethoxazole-Trimethoprim]     Lisinopril      Felt bad, was OK with Zestril brand name   Tramadol        Social History:     Marital Status:    Occupation:  Noncontributing  Patient Pre-hospital Living Situation: home  Patient Pre-hospital Level of Mobility: reg  Patient Pre-hospital Diet Restrictions: reg  Substance Use History:   Social History     Substance and Sexual Activity   Alcohol Use No     Social History     Tobacco Use   Smoking Status Never Smoker   Smokeless Tobacco Never Used   Tobacco Comment    no second hand smoke exposure     Social History     Substance and Sexual Activity   Drug Use No       Family History:    non-contributory    Physical Exam:     Vitals:   Blood Pressure: (!) 191/94 (03/13/20 0130)  Pulse: 68 (03/13/20 0130)  Temperature: 98 5 °F (36 9 °C) (03/13/20 0118)  Temp Source: Oral (03/13/20 0118)  Respirations: 16 (03/13/20 0130)  Weight - Scale: 117 kg (257 lb 15 oz) (03/13/20 0045)  SpO2: 97 % (03/13/20 0130)    Physical Exam   Constitutional: He is oriented to person, place, and time  No distress  HENT:   Head: Normocephalic  Eyes: Pupils are equal, round, and reactive to light  Cardiovascular: Normal rate  Pulmonary/Chest: Effort normal    Abdominal:   Obese distended nontender   Musculoskeletal: He exhibits no edema  Neurological: He is alert and oriented to person, place, and time  No cranial nerve deficit or sensory deficit  Coordination abnormal    Left upper and lower extremity 5/5  Right upper extremity 3/5 right lower extremity 1/5  Positive right upper extremity pronator drift         Additional Data:     Lab Results: I have personally reviewed pertinent reports        Results from last 7 days Lab Units 03/12/20  2332   WBC Thousand/uL 8 32   HEMOGLOBIN g/dL 15 3   HEMATOCRIT % 44 4   PLATELETS Thousands/uL 179     Results from last 7 days   Lab Units 03/12/20  2332   SODIUM mmol/L 138   POTASSIUM mmol/L 3 5   CHLORIDE mmol/L 106   CO2 mmol/L 29   BUN mg/dL 13   CREATININE mg/dL 1 07   ANION GAP mmol/L 3*   CALCIUM mg/dL 8 7   GLUCOSE RANDOM mg/dL 129     Results from last 7 days   Lab Units 03/12/20  2332   INR  1 80*     Results from last 7 days   Lab Units 03/12/20  2330   POC GLUCOSE mg/dl 120               Imaging: I have personally reviewed pertinent reports  X-ray chest 1 view portable   ED Interpretation by Yeyo Duarte MD (03/13 0018)   Limited chest x-ray secondary to poor inspiratory effort and technique  No obvious abnormality  CTA stroke alert (head/neck)   Final Result by Safia Filter, DO (03/13 0017)      No acute intracranial process is seen  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 (axial image 171, series 6 and sagittal image 55, series 602)  Moderate narrowing of the anterior M2 branch on the right at the origin (axial image 169, series 6)  Moderate narrowing of the distal P1 segment on the right and mild narrowing of the proximal P1 segment on the left  No occlusion or severe stenosis of the major arteries of the head and neck seen  No stenosis within either internal carotid artery  Patent bilateral vertebral arteries  Sinus disease as described  Other findings as above  Findings discussed with Dr Alaina Kawasaki by Dr Ayden Fleming at 12:10 AM on 3/13/2020  Workstation performed: [id number]         CT stroke alert brain   Final Result by Safia Filter, DO (03/13 0017)      No acute intracranial process is seen        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 (axial image 171, series 6 and sagittal image 55, series 602)  Moderate narrowing of the anterior M2 branch on the right at the origin (axial image 169, series 6)  Moderate narrowing of the distal P1 segment on the right and mild narrowing of the proximal P1 segment on the left  No occlusion or severe stenosis of the major arteries of the head and neck seen  No stenosis within either internal carotid artery  Patent bilateral vertebral arteries  Sinus disease as described  Other findings as above  Findings discussed with Dr Salud Babb by Dr Alda Hurley at 12:10 AM on 3/13/2020  Workstation performed: [id number]             EKG, Pathology, and Other Studies Reviewed on Admission:   · EKG: NSR    Allscripts / Epic Records Reviewed: Yes     ** Please Note: This note has been constructed using a voice recognition system   **

## 2020-03-13 NOTE — SPEECH THERAPY NOTE
Speech Language/Pathology  Speech/Language Pathology  Assessment    Patient Name: Oscar Esquivel  [id number] Date: 3/13/2020     Problem List  Principal Problem:    Stroke-like symptoms  Active Problems:    H/O aortic aneurysm repair    Essential hypertension    PAF (paroxysmal atrial fibrillation) ([facility])    Hyperlipidemia    Class 2 obesity due to excess calories in adult    History of CVA (cerebrovascular accident)    Past Medical History  Past Medical History:   Diagnosis Date    Aneurysm of thoracic aorta ([address] )     last assessed 11/20/17    Anxiety     currently on no meds    Cardiac disease     Cholelithiasis     Chronic kidney disease     GERD (gastroesophageal reflux disease)     Headache due to anesthesia     Hyperlipidemia     Hypertension     Irritable bowel syndrome     Kidney stone     Palpitations     PONV (postoperative nausea and vomiting)     Shortness of breath     ? related to acid reflux    Sleep apnea     not sure    Stroke [facility]) 11/2014    TIA (transient ischemic attack)      Past Surgical History  Past Surgical History:   Procedure Laterality Date    AORTA SURGERY      thoracic - aneurysmorrhaphy    APPENDECTOMY      ASCENDING AORTIC ANEURYSM REPAIR      resolved 8/19/15    CHOLECYSTECTOMY      laparoscopic    CYSTOSCOPY      with removal of object- stent removal    KNEE ARTHROSCOPY Right 1996    with medial meniscus repair    LITHOTRIPSY      renal    AL FRAGMENT KIDNEY STONE/ ESWL Left 5/17/2018    Procedure: LITHROTRIPSY EXTRACORPORAL SHOCKWAVE (ESWL); Surgeon: Norm Borges MD;  [facility];  Service: Urology    THUMB ARTHROSCOPY Right 1976    ligament repair        Bedside Swallow Evaluation:    Summary:  Pt presents w/ clear and fluent speech  No s/s oral/pharyngeal dysphagia       Recommendations:  Diet: Regular  Liquid: thin  Meds:as tgolerated  Supervision: none at this time  Positioning:Upright  Strategies: Pt to take PO/Meds only when fully alert and upright  Oral care  Reflux precautions  Eval only, No f/u tx indicated  Patient's goal: was hoping the "parade" in his room would stop    Consider consult w/:  Nutrition    Reason for consult:  R/o aspiration  Determine safest and least restrictive diet  New neuro event  Stroke protocol  Current diet:  npo  Premorbid diet[de-identified]  regular  Previous VBS:  none  O2 requirement:  none  Voice/Speech:  Wnl  Flat affect  Social:  Home w/ family  Follows commands:  yes             Cognitive Status:  A&O  [facility]  Dentition:natural  Labial strength and ROM:+  Lingual strength and ROM:+  Mandibular strength and ROM:+  Secretion management:+      Items administered: Toast, thin liquids  Fed self  Left handed  Oral stage:+  Lip closure:+  Mastication:+  Bolus formation:+  Bolus control:+  Transfer:+  Oral residue:+  Pocketing:+    Pharyngeal stage:+  Swallow promptness:+  Laryngeal rise:+  Wet voice:-  Throat clear:-  Cough:-  Secondary swallows:-  Audible swallows:-  No overt s/s aspiration    Esophageal stage:  H/o GERD    Results d/w:  Pt, nursing, family      Chief Complaint:   Acute onset of right-sided weakness  History of Present Illness:  Tamir Real is a 61 y o  male with past medical history of ascending aortic aneurysm repair , PAF on Xarelto, previous TIA/stroke who presented from home as a stroke alert for evaluation of acute onset of right-sided weakness patient reports that he went to the bed around 9:30 p m  Approximately 1 hour later he attempted to stand up and fell down  due to flaccid right lower extremity  Upon initial presentation his NIH score was 6  CT of the head stroke alert and CTA head and neck did not reveal acute pathology dissection or severe stenosis however patient was not a candidate for tPA due to Xarelto taken by patient at 5 p m  Today  EKG shows sinus rhythm    Hemodynamically he remained stable  He admitted on stroke pathway for further evaluation of persistent neurologic deficit

## 2020-03-13 NOTE — PLAN OF CARE
Problem: OCCUPATIONAL THERAPY ADULT  Goal: Performs self-care activities at highest level of function for planned discharge setting  See evaluation for individualized goals  Description  Treatment Interventions: ADL retraining, Visual perceptual retraining, Functional transfer training, UE strengthening/ROM, Endurance training, Cognitive reorientation, Equipment evaluation/education, Patient/family training, Fine motor coordination activities, Neuromuscular reeducation, Compensatory technique education, Continued evaluation, Activityengagement          See flowsheet documentation for full assessment, interventions and recommendations  Note:   Limitation: Decreased ADL status, Decreased UE strength, Decreased Safe judgement during ADL, Decreased UE ROM, Decreased cognition, Decreased endurance, Decreased self-care trans, Decreased high-level ADLs, Decreased fine motor control, Visual deficit  Prognosis: Fair  Assessment: "Yoandy Tucker" is a L hand dominant 61year old male seen for initial OT evaluation s/p admission to [facility] with acute onset of R sided weakness resulting in a fall  CT head negative and pt didn't receive tPA  Etiology suspected to be ischemic stroke  PMHx includes: CKD, HTN, HLD, TIA and subacute infarcts in white matter of R posterior frontal convexity  Pt with active OT orders and cleared by RN for OT evaluation and OOB mobility  Pt resides with his sister in [facility]  Pt was I with ADLs, IADLs, and functional mobility without use of DME PTA  Pt is a   Pt is currently demonstrating the following occupational deficits: eating with set-up, grooming with Cta, UB bathing with Cat, LB bathing with modA, UB dressing with modA, LB dressing with maxA, toileting with Cat, bed mobility with modAx2, functional transfers with maxAx2, and functional mobility with maxAx2    Factors currently limiting pt's independence in these areas include: R sided weakness, balance, functional mobility, cognitive deficits, endurance, activity tolerance, and unsupportive home environment  From an OT standpoint, recommend STR upon d/c  Pt is to continue to benefit from skilled occupational therapy services while in the hospital to maximize functioning and independence with daily activities  See below for OT goals to be addressed 3-5x/wk       OT Discharge Recommendation: Short Term Rehab  OT - OK to Discharge: Yes(to STR when medically stable) Hemostasis: Electrocautery

## 2020-03-13 NOTE — SOCIAL WORK
Met with pt & explained CM role  Pt lives with sister in [address] [facility] home with 6 kami  Bedroom & bath on 2nd flr  Was IPTA, on disability & drives  No dme  H/o SL VNA  No h/o rhb  IP MH admission in 2014 at [facility]  Denies any D&A tx  Uses [facility] [address]  [place]  No POA  Emergency contact, sister Giovanny Dong [phone]  S/w PT whom is recommending acute rehab  List of facilities provided  Pt is requesting referral to  [facility]  A post acute care recommendation was made by your care team for Acute Rehab  Discussed [place] of Choice with patient  List of facilities given to patient via in person  patient aware the list is custom filtered for them by preference  and that [facility] post acute providers are designated  CM reviewed d/c planning process including the following: identifying help at home, patient preference for d/c planning needs, Discharge Lounge, [facility] Meds to Bed program, availability of treatment team to discuss questions or concerns patient and/or family may have regarding understanding medications and recognizing signs and symptoms once discharged  CM also encouraged patient to follow up with all recommended appointments after discharge  Patient advised of importance for patient and family to participate in managing patients medical well being

## 2020-03-13 NOTE — OCCUPATIONAL THERAPY NOTE
Occupational Therapy Evaluation     Patient Name: Aubrey SHAH Date: 3/13/2020  Problem List  Principal Problem:    Stroke-like symptoms  Active Problems:    H/O aortic aneurysm repair    Essential hypertension    PAF (paroxysmal atrial fibrillation) ([facility])    Hyperlipidemia    Class 2 obesity due to excess calories in adult    History of CVA (cerebrovascular accident)    Past Medical History  Past Medical History:   Diagnosis Date    Aneurysm of thoracic aorta ([address] )     last assessed 11/20/17    Anxiety     currently on no meds    Cardiac disease     Cholelithiasis     Chronic kidney disease     GERD (gastroesophageal reflux disease)     Headache due to anesthesia     Hyperlipidemia     Hypertension     Irritable bowel syndrome     Kidney stone     Palpitations     PONV (postoperative nausea and vomiting)     Shortness of breath     ? related to acid reflux    Sleep apnea     not sure    Stroke [facility]) 11/2014    TIA (transient ischemic attack)      Past Surgical History  Past Surgical History:   Procedure Laterality Date    AORTA SURGERY      thoracic - aneurysmorrhaphy    APPENDECTOMY      ASCENDING AORTIC ANEURYSM REPAIR      resolved 8/19/15    CHOLECYSTECTOMY      laparoscopic    CYSTOSCOPY      with removal of object- stent removal    KNEE ARTHROSCOPY Right 1996    with medial meniscus repair    LITHOTRIPSY      renal    [place] FRAGMENT KIDNEY STONE/ ESWL Left 5/17/2018    Procedure: LITHROTRIPSY EXTRACORPORAL SHOCKWAVE (ESWL); Surgeon: Sara Matamoros MD;  [facility];  Service: Urology    THUMB ARTHROSCOPY Right 1976    ligament repair        03/13/20 0915   Note Type   Note type Eval only   Restrictions/Precautions   Weight Bearing Precautions Per Order No   Other Precautions Cognitive; Bed Alarm; Chair Alarm;Multiple lines;Telemetry; Fall Risk   Pain Assessment   Pain Assessment Tool 0-10   Pain Score No Pain   Home Living   Type of [address] Two level;Bed/bath upstairs   Bathroom Shower/Tub Tub/shower unit   Bathroom Toilet Standard   Bathroom Equipment   (no bathroom DME PTA)   Home Equipment   (no DME PTA)   Additional Comments Pt resides with his sister (works and can provide limited assist) in a [facility] with bed/bath on 2nd floor  Prior Function   Level of [place] Independent with ADLs and functional mobility   Lives With   (sister)   Receives Help From Family   ADL Assistance Independent   IADLs Independent   Falls in the last 6 months 0   Vocational Part time employment   Lifestyle   Autonomy Pt reports being I with ADLs, IADLs, and functional mobility without use of DME PTA  Pt is a   Reciprocal Relationships sister (elderly and works)   Service to [employer] in his home PT   Intrinsic Gratification sedentary   Psychosocial   Psychosocial (WDL) WDL   Subjective   Subjective Pt reports that his previous CVA had residual "brain scrambling" and "night terrors"  Reports that it was difficult for him to drive following CVA, reporting that he had a difficult time executing his movements while driving  Pt continues to drive despite these difficulties  ADL   Eating Assistance 5  Supervision/Setup   Grooming Assistance 4  Minimal Assistance   UB Bathing Assistance 4  Minimal Assistance   LB Bathing Assistance 3  Moderate Assistance   UB Dressing Assistance 3  Moderate Assistance   LB Dressing Assistance 2  Maximal [address]  4  Minimal Assistance   Additional Comments Pt able to don L sock with steadying, unable to reach R foot to don sock  Able to thread L LE into pants, but requires assistance to thread R LE and pull over hips  Bed Mobility   Supine to Sit 3  Moderate assistance   Additional items Assist x 2; Increased time required;Verbal cues;LE management   Sit to Supine 3  Moderate assistance   Additional items Assist x 2; Increased time required;Verbal cues;LE management   Additional Comments Pt required modAx2 with HOB raised to ~75* for ease  Pt requires SBA for static sitting, Cat for dynamic sitting balance   Transfers   Sit to Stand 3  Moderate assistance   Additional items Assist x 2; Increased time required;Verbal cues   Stand to Sit 3  Moderate assistance   Additional items Assist x 2; Increased time required;Verbal cues   Stand pivot 2  Maximal assistance   Additional items Assist x 2; Increased time required;Verbal cues   Additional Comments Pt completed STS transfer with R knee block and VCs for safe hand placement  Pt requires assist for R LE management during stand pivot transfer  Functional Mobility   Functional Mobility 2  Maximal assistance   Additional Comments Ax2 with use of rw and assist with R LE management   Balance   Static Sitting Fair -   Dynamic Sitting Poor +   Static Standing Poor   Dynamic Standing Poor   Ambulatory Poor -   Activity Tolerance   Activity Tolerance Patient limited by fatigue   Medical Staff Made Aware PT, CM   Nurse Made Aware Pt cleared by HERON James for OT evaluation and OOB mobility   RUE Assessment   RUE Assessment   (grasp WFL, elbow 3+/5, shoulder 2/5)   LUE Assessment   LUE Assessment   (L hand dominant, WFL strength)   Hand Function   Gross Motor Coordination   (R side mild imp finger to nose)   Fine Motor Coordination   (R side impaired)   Sensation   Light Touch No apparent deficits   Vision-Basic Assessment   Current Vision Wears glasses only for reading   Vision - Complex Assessment   Ocular Range of Motion WFL   Head Position WDL   Tracking Decreased smoothness of horizontal tracking   Additional Comments Pt reports intermittent decreased visual acuity in OS with episodes lasting for 1-5 minutes (has been happening for a few months)  Pt reports that his vision appears "jerking", which is seen during smooth pursuits     Perception   Inattention/Neglect Appears intact   Cognition   Overall Cognitive Status Impaired   Arousal/Participation Alert; Responsive; Cooperative   Attention Attends with cues to redirect   Orientation Level Oriented X4   Memory Decreased recall of precautions   Following Commands Follows one step commands with increased time or repetition   Comments Pt requires increased time for processing, somewhat self limiting and benefits from encouragement  Pt with limited insight and reports h/o "brain scrambling" after previous CVA  Reports difficulty with driving, yet continues to drive  Plan to complete formal cognitive assessment during pt's POC  Assessment   Limitation Decreased ADL status; Decreased UE strength;Decreased Safe judgement during ADL;Decreased UE ROM; Decreased cognition;Decreased endurance;Decreased self-care trans;Decreased high-level ADLs; Decreased fine motor control;Visual deficit   Prognosis Fair   Assessment "Kirit Hogan" is a L hand dominant 61year old male seen for initial OT evaluation s/p admission to [facility]"" with acute onset of R sided weakness resulting in a fall  CT head negative and pt didn't receive tPA  Etiology suspected to be ischemic stroke  PMHx includes: CKD, HTN, HLD, TIA and subacute infarcts in white matter of R posterior frontal convexity  Pt with active OT orders and cleared by RN for OT evaluation and OOB mobility  Pt resides with his sister in a [address]  Pt was I with ADLs, IADLs, and functional mobility without use of DME PTA  Pt is a   Pt is currently demonstrating the following occupational deficits: eating with set-up, grooming with Cat, UB bathing with Cat, LB bathing with modA, UB dressing with modA, LB dressing with maxA, toileting with Cat, bed mobility with modAx2, functional transfers with maxAx2, and functional mobility with maxAx2  Factors currently limiting pt's independence in these areas include: R sided weakness, balance, functional mobility, cognitive deficits, endurance, activity tolerance, and unsupportive home environment    From an OT standpoint, recommend STR upon d/c   Pt is to continue to benefit from skilled occupational therapy services while in the hospital to maximize functioning and independence with daily activities  See below for OT goals to be addressed 3-5x/wk  Goals   Patient Goals to be independent with self care   Plan   Treatment Interventions ADL retraining;Visual perceptual retraining;Functional transfer training;UE strengthening/ROM; Endurance training;Cognitive reorientation;Equipment evaluation/education;Patient/family training;Fine motor coordination activities; Neuromuscular reeducation; Compensatory technique education;Continued evaluation; Activityengagement   Goal Expiration Date 03/23/20   OT Treatment Day 1   OT Frequency 3-5x/wk   Additional Treatment Session   Start Time 0900   End Time 0915   Treatment Assessment Pt is seen for skilled OT session including interventions to: increase independence with LB dressing, improving functional transfers, and fall prevention strategies  Pt educated on using L LE to hook R to pull up into tailor sit for ease of LB dressing  Pt dons L sock with set-up, R sock with modA (able to start threading sock, but sock gets stuck between toes and pt unable to reach to adjust it)  Pt threads L LE into pant leg with steadying, requires assist with clothing management but able to thread R LE into pants using hooking technique and assist with steadying seated EOB  Pt educated to complete functional transfers towards his stronger side to promote independence  Pt completed stand pivot transfer with maxAx2, with physical assistance to move R LE       Additional Treatment Day 2   Recommendation   OT Discharge Recommendation Short Term Rehab   OT - OK to Discharge Yes  (to STR when medically stable)   Barthel Index   Feeding 5   Bathing 0   Grooming Score 0   Dressing Score 5   Bladder Score 5   Bowels Score 10   Toilet Use Score 5   Transfers (Bed/Chair) Score 5   Mobility (Level Surface) Score 0   Stairs Score 0   Barthel Index Score 35     Goals:    Pt will be attentive 100% of the time during ongoing cognitive assessment w/ G participation to assist w/ safe d/c planning/recommendations  Pt will improve activity tolerance to G for min 30 min txment sessions for increase engagement in functional tasks  Pt will complete UB self care tasks w/ Maria M w/ use of DME/AD/one handed compensatory strategies as appropriate  Pt will complete LB self care tasks w/ SBA with use of DME/AD/one handed compensatory strategies as appropriate  Pt will improve functional transfers to SBA on/off all surfaces using DME as needed w/ G balance/safety     Pt will improve functional mobility during ADL/IADL/leisure tasks to Cat using DME as needed w/ G balance/safety     Pt will participate in simulated IADL management task to increase independence to Mod I w/ G safety and endurance    Pt will demonstrate G carryover of pt/caregiver education and training as appropriate w/o cues w/ good tolerance to increase safety during functional tasks    Pt will maintain standing upright with SBA or at least 10 minutes while completing a dynamic functional activity with good balance and endurance  Pt will engage in depression screen/leisure interest checklist w/ mod I and G participation to monitor s/s depression and ID 3 positive coping strategies to A w/ emotional regulation and management      Stacy De Los Santos, MOT, OTR/L

## 2020-03-13 NOTE — UTILIZATION REVIEW
Initial Clinical Review    Admission: Date/Time/Statement: Admission Orders (From admission, onward)     Ordered        03/13/20 0124  Inpatient Admission  Once                   Orders Placed This Encounter   Procedures    Inpatient Admission     Standing Status:   Standing     Number of Occurrences:   1     Order Specific Question:   Admitting Physician     Answer:   Aaron Singh     Order Specific Question:   Level of Care     Answer:   Med Surg [16]     Order Specific Question:   Estimated length of stay     Answer:   More than 2 Midnights     Order Specific Question:   Certification     Answer:   I certify that inpatient services are medically necessary for this patient for a duration of greater than two midnights  See H&P and MD Progress Notes for additional information about the patient's course of treatment  ED Arrival Information     Expected Arrival Acuity Means of Arrival Escorted By Service Admission Type    - 3/12/2020 23:24 [facility] Hospitalist Emergency    Arrival Complaint    Stroke Alert        Chief Complaint   Patient presents with   [address]     pt woke up around 2230 and felt right sided weakness  hx of stroke in 2014  +thinners     Assessment/Plan:    61year old male presents to ed from home vis ems for evaluation and treatment of right lower extremity flaccid paralysis and right upper extremity weakness  PMHX  Anticoagulated on xarelto, cardiac disease, s fib, thoracic aorta aneurysm, hypertension, chronic kidney disease, irritable bowel syndrome,  TIA,  MRI in January shows subacute infarcts   On arrival patient reports last normal at 9:30 pm when he went to bed  He also notes occipital neck pain, paroxysmal numbness and dizziness for the last week  Physical examination significant for hypertension  Flaccid paralysis of right lower extremity, right upper extremity  4-5/10 pain and 3/5 strength    CTA shows  Narrowing of M2  And P1 segments  NIHSS =6   EKG shows non specific T wave abnormality  Patient is within the tPA window but is not a candidate for tPA secondary to anticoagulation on Xarelto  Admit to inpatient medical surgical for persistent stroke like symptoms  Plan includes neurology consult, telemetry, neuro checks q4 hr, PT/OT /Speech evaluations, dysphagia assessment prior to po intake, continue statin and xarelto, lipid panel with ldl, hba1c, echo, MRI  Brain , stroke education, permissive hypertension, PMR consult     Neurology consult   · Acute right-sided weakness - suspected ischemic stroke - etiology most likely cardio embolic with hx of afib, need to rule out other etiologies - aortic arch atheroma  · Subacute infarcts seen on MRI on 1/20 - right posterior frontal convexity  · Hx of PAF post op 2014, with no subjective reoccurrence - recently started on Xarelto  · Intracranial ascvd disease as stated above - by CTA of head and neck - please see above  · HTN  · Hyperlipidemia  · Obesity  · Hx of aortic aneurysm repair      - Stroke pathway  · MRI brain with out contrast  · Echo  · Lipid Panel  · Hemoglobin A1c  · On Aspirin 81 mg, on Xarelto  · Atorvastatin 40 mg  · Permissive HTN for now  · Continue telemetry, may need to consider loop monitor - consider cardiology consultation  · PT/OT/ST  · Frequent neuro checks  Continue to monitor and notify neurology with any changes       ED Triage Vitals   03/13/20 0118 03/12/20 2327 03/12/20 2327 03/12/20 2327 03/12/20 2327   98 5 °F (36 9 °C) 76 18 (!) 194/93 100 %      Oral Monitor         No Pain       03/13/20 113 kg (248 lb 7 3 oz)     Additional Vital Signs:       Date/Time  Temp  Pulse  Resp  BP  MAP (mmHg)  SpO2  O2 Device  Patient Position - Orthostatic VS   03/13/20 1050  98 4 °F (36 9 °C)  73  16  154/89  111  97 %       03/13/20 08:54:13    72    157/99  118  97 %       03/13/20 0800              None (Room air)     03/13/20 0530    71   149/86  107  95 %       03/13/20 0430    72  16  178/99  Abnormal   125  99 %    Lying   03/13/20 0330    70  16  176/97  Abnormal   123  96 %    Lying   03/13/20 0236              None (Room air)     03/13/20 02:22:48  98 3 °F (36 8 °C)  63  18  146/93  111  97 %       03/13/20 0130    68  16  191/94  Abnormal     97 %    Lying   03/13/20 0118  98 5 °F (36 9 °C)                 03/13/20 0115    90  20  191/94  Abnormal     96 %    Lying   03/13/20 0100    96  19  166/86    96 %    Lying   03/13/20 0045    76  18  174/90  Abnormal     96 %    Lying   03/13/20 0030    74  13  158/85    96 %    Lying   03/13/20 0015    74  19  172/89  Abnormal     96 %    Lying   03/13/20 0000    72  18  179/88  Abnormal     97 %    Lying   03/12/20 2345    78  18  180/98  Abnormal     97 %    Lying   03/12/20 2335    78  18  179/90  Abnormal     96 %    Lying   03/12/20 2330    75  18  190/95  Abnormal     100 %    Lying               Pertinent Labs/Diagnostic Test Results:     NIH Stroke Scale     Interval  Baseline     Level of Consciousness (1a )  0     LOC Questions (1b )  0     LOC Commands (1c )  0     Best Gaze (2 )  0     Visual (3 )  0     Facial Palsy (4 )  0     Motor Arm, Left (5a )  0     Motor Arm, Right (5b )  2     Motor Leg, Left (6a )  0     Motor Leg, Right (6b )  4     Limb Ataxia (7 )  0     Sensory (8 )  0     Best Language (9 )  0     Dysarthria (10 )  0     Extinction and Inattention (11 ) (Formerly Neglect)  0     Total  6          Collection Time Result Time Vent R Atrial R MN Int  QRSD Int  QT Int  QTC Int  P Axis QRS Axis T Wave Ax    03/12/20 23:48:12 03/13/20 13:20:32 74 74 166 86 366 406 57 38 35         Normal sinus rhythm  Nonspecific T wave abnormality  Abnormal ECG  When compared with ECG of 26-AUG-2017 16:25,  No significant change was found      X-ray chest 1 view portable   Final  (03/13 0811)      Suboptimal inspiration with mild cardiomegaly  No acute cardiopulmonary disease  CTA stroke alert (head/neck)   Final  (03/13 0017)      No acute intracranial process is seen  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 (axial image 171, series 6 and sagittal image 55, series 602)  Moderate narrowing of the anterior M2 branch on the right at the origin (axial image 169, series 6)  Moderate narrowing of the distal P1 segment on the right and mild narrowing of the proximal P1 segment on the left  No occlusion or severe stenosis of the major arteries of the head and neck seen  No stenosis within either internal carotid artery  Patent bilateral vertebral arteries  Sinus disease as described  Other findings as above  CT stroke alert brain   Final (03/13 0017)      No acute intracranial process is seen  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 (axial image 171, series 6 and sagittal image 55, series 602)  Moderate narrowing of the anterior M2 branch on the right at the origin (axial image 169, series 6)  Moderate narrowing of the distal P1 segment on the right and mild narrowing of the proximal P1 segment on the left  No occlusion or severe stenosis of the major arteries of the head and neck seen  No stenosis within either internal carotid artery  Patent bilateral vertebral arteries  Sinus disease as described  Other findings as above           MRI brain w wo contrast    (Results Pending)     Results from last 7 days   Lab Units 03/13/20  0558 03/12/20  2332   WBC Thousand/uL 8 10 8 32   HEMOGLOBIN g/dL 14 9 15 3   HEMATOCRIT % 43 2 44 4   PLATELETS Thousands/uL 174 179         Results from last 7 days   Lab Units 03/13/20  0558 03/12/20  2332   SODIUM mmol/L 140 138   POTASSIUM mmol/L 3 4* 3 5   CHLORIDE mmol/L 106 106   CO2 mmol/L 29 29   ANION GAP mmol/L 5 3* BUN mg/dL 10 13   CREATININE mg/dL 0 97 1 07   EGFR ml/min/1 73sq m 84 75   CALCIUM mg/dL 8 7 8 7         Results from last 7 days   Lab Units 03/12/20  2330   POC GLUCOSE mg/dl 120     Results from last 7 days   Lab Units 03/13/20  0558 03/12/20  2332   GLUCOSE RANDOM mg/dL 126 129         Results from last 7 days   Lab Units 03/13/20  0558   HEMOGLOBIN A1C % 5 5   EAG mg/dl 111     Results from last 7 days   Lab Units 03/12/20  2332   TROPONIN I ng/mL <0 02         Results from last 7 days   Lab Units 03/12/20  2332   PROTIME seconds 20 4*   INR  1 80*   PTT seconds 37     Results from last 7 days   Lab Units 03/13/20  0330   CRP mg/L 4 6*     ED Treatment:   Medication Administration from 03/12/2020 2318 to 03/13/2020 0204   None      Past Medical History:   Diagnosis Date    Aneurysm of thoracic aorta ([place] [address] )     last assessed 11/20/17    Anxiety     currently on no meds    Cardiac disease     Cholelithiasis     Chronic kidney disease     GERD (gastroesophageal reflux disease)     Headache due to anesthesia     Hyperlipidemia     Hypertension     Irritable bowel syndrome     Kidney stone     Palpitations     PONV (postoperative nausea and vomiting)     Shortness of breath     ? related to acid reflux    Sleep apnea     not sure    Stroke ([place] [address] ) 11/2014    TIA (transient ischemic attack)      Present on Admission:   PAF [facility]paroxysmal atrial fibrillation) ([facility]   Stroke-like symptoms   Essential hypertension   Hyperlipidemia   Class 2 obesity due to excess calories in adult      Admitting Diagnosis:     Stroke ([place] [address] ) [I63 9]  Right sided weakness [R53 1]  Cerebrovascular accident (CVA) due to embolism of cerebral artery ([place] [address] ) [I63 40]    Age/Sex: 61 y o  male     Admission Orders:      Scheduled Medications:    Medications:  amLODIPine 5 mg Oral Daily   atorvastatin 40 mg Oral Daily With Dinner   cholecalciferol 1,000 Units Oral Daily   docusate sodium 100 mg Oral BID   metoprolol succinate 150 mg Oral Daily   pantoprazole 40 mg Oral Early Morning   rivaroxaban 20 mg Oral Daily With Breakfast   senna 1 tablet Oral Daily     Continuous IV Infusions:    sodium chloride 75 mL/hr Intravenous Continuous     PRN Meds:    ondansetron 4 mg Intravenous Q6H PRN       IP CONSULT TO NEUROLOGY  IP CONSULT TO CASE MANAGEMENT  IP CONSULT TO NUTRITION SERVICES  IP CONSULT TO PHYSICAL MEDICINE REHAB    Network Utilization Review Department  [email or web address]  ATTENTION: Please call with any questions or concerns to [phone] and carefully listen to the prompts so that you are directed to the right person  All voicemails are confidential   Lee Rodríguez all requests for admission clinical reviews, approved or denied determinations and any other requests to dedicated fax number below belonging to the campus where the patient is receiving treatment   List of dedicated fax numbers for the Facilities:  [address] DENIALS (Administrative/Medical Necessity) [phone]   [address] (Maternity/NICU/Pediatrics) [phone]   Maxime Swanson [phone]   Rito Hull [phone]   [facility] [phone]   [address] [place] [address]  [phone]   [address] [phone]   [facility]  [phone]   [address] [facility] [address] [facility]  [address] [facility] [address] [facility] [phone]

## 2020-03-13 NOTE — ED PROVIDER NOTES
History  Chief Complaint   Patient presents with   [address]     pt woke up around 2230 and felt right sided weakness  hx of stroke in 2014  +thinners     27-year-old male with previous medical history of stroke in 2014 presenting emergency department with right lower extremity flaccid paralysis and right upper extremity weakness  Patient is on Xarelto  Patient's last known normal was before going to bed at 9:30 p m  Calos Stout Patient awoke from sleep 45 minutes prior to arrival in the emergency department and fell out of bed as he had a flaccid right lower extremity  Patient denies change in vision, hearing, numbness, tingling  Patient notes that he had no permanent deficits from his stroke in 2014  Patient also notes occipital neck pain  Patient also notes paroxysmal numbness and dizziness over the last week  STROKE Alert   Severity:  Severe  Onset quality:  Sudden  Duration:  2 hours  Timing:  Constant  Progression:  Unchanged  Chronicity:  Recurrent  Relieved by:  None  Worsened by:  None      Prior to Admission Medications   Prescriptions Last Dose Informant Patient Reported? Taking?    amLODIPine (NORVASC) 5 mg tablet   No Yes   Sig: TAKE 1 TABLET BY MOUTH EVERY DAY   amoxicillin (AMOXIL) 500 mg capsule  Self Yes Yes   Sig: TAKE 4 CAPSULES 1 HOUR PRIOR TO APPOINTMENT   cholecalciferol (VITAMIN D3) 1,000 units tablet  Self No Yes   Sig: Take 1 tablet (1,000 Units total) by mouth daily   metoprolol succinate (TOPROL-XL) 50 mg 24 hr tablet  Self No Yes   Sig: Take 3 tablets (150 mg total) by mouth daily   pantoprazole (PROTONIX) 40 mg tablet   No Yes   Sig: TAKE 1 TABLET (40 MG TOTAL) BY MOUTH DAILY   rivaroxaban (XARELTO) 20 mg tablet  Self No Yes   Sig: Take 1 tablet (20 mg total) by mouth daily with breakfast   rosuvastatin (CRESTOR) 20 MG tablet  Self No Yes   Sig: TAKE 1 TABLET BY MOUTH EVERY DAY      Facility-Administered Medications: None       Past Medical History:   Diagnosis Date    Aneurysm of thoracic aorta ([facility] [address] )     last assessed 11/20/17    Anxiety     currently on no meds    Cardiac disease     Cholelithiasis     Chronic kidney disease     GERD (gastroesophageal reflux disease)     Headache due to anesthesia     Hyperlipidemia     Hypertension     Irritable bowel syndrome     Kidney stone     Palpitations     PONV (postoperative nausea and vomiting)     Shortness of breath     ? related to acid reflux    Sleep apnea     not sure    [facility]roke [facility] 11/2014    TIA (transient ischemic attack)        Past Surgical History:   Procedure Laterality Date    AORTA SURGERY      thoracic - aneurysmorrhaphy    APPENDECTOMY      ASCENDING AORTIC ANEURYSM REPAIR      resolved 8/19/15    CHOLECYSTECTOMY      laparoscopic    CYSTOSCOPY      with removal of object- stent removal    KNEE ARTHROSCOPY Right 1996    with medial meniscus repair    LITHOTRIPSY      renal    AL FRAGMENT KIDNEY STONE/ ESWL Left 5/17/2018    Procedure: LITHROTRIPSY EXTRACORPORAL SHOCKWAVE (ESWL); Surgeon: Ne Busby MD;  Location: [facility]  MAIN [facility];  Service: Urology    THUMB ARTHROSCOPY Right 1976    ligament repair       Family History   Problem Relation Age of Onset    Diverticulitis Mother         of colon    Nephrolithiasis Mother     Emphysema Father     Nephrolithiasis Sister     Heart attack Maternal Grandfather 72    Breast cancer Paternal Grandmother     Lung cancer Paternal Grandfather     Cancer Family     Coronary artery disease Family     Diabetes Family         sibling    Hypertension Family         sibling    Hernia Family         sibling     I have reviewed and agree with the history as documented      E-Cigarette/Vaping     E-Cigarette/Vaping Substances     Social History     Tobacco Use    Smoking status: Never Smoker    Smokeless tobacco: Never Used    Tobacco comment: no second hand smoke exposure   Substance Use Topics    Alcohol use: No    Drug use: No        Review of Systems   Neurological: Positive for weakness  All other systems reviewed and are negative  Physical Exam  ED Triage Vitals   Temperature Pulse Respirations Blood Pressure SpO2   03/13/20 0118 03/12/20 2327 03/12/20 2327 03/12/20 2327 03/12/20 2327   98 5 °F (36 9 °C) 76 18 (!) 194/93 100 %      Temp Source Heart Rate Source Patient Position - Orthostatic VS BP Location FiO2 (%)   03/13/20 0030 03/12/20 2327 03/12/20 2327 -- --   Oral Monitor Lying        Pain Score       --                    Orthostatic Vital Signs  Vitals:    03/13/20 0045 03/13/20 0100 03/13/20 0115 03/13/20 0130   BP: (!) 174/90 166/86 (!) 191/94 (!) 191/94   Pulse: 76 96 90 68   Patient Position - Orthostatic VS: Lying Lying Lying Lying       Physical Exam   Constitutional: He is oriented to person, place, and time  He appears well-developed and well-nourished  No distress  HENT:   Head: Normocephalic and atraumatic  Right Ear: External ear normal    Left Ear: External ear normal    Eyes: Conjunctivae and EOM are normal    Neck: No JVD present  No tracheal deviation present  Cardiovascular: Normal rate, regular rhythm, normal heart sounds and intact distal pulses  No murmur heard  Pulmonary/Chest: Breath sounds normal  No stridor  No respiratory distress  He has no wheezes  He has no rales  Abdominal: Soft  Bowel sounds are normal  He exhibits no distension and no mass  There is no tenderness  There is no rebound and no guarding  Genitourinary:   Genitourinary Comments: Deferred   Musculoskeletal: He exhibits no edema, tenderness or deformity  Neurological: He is alert and oriented to person, place, and time  He exhibits abnormal muscle tone  Coordination normal    Flaccid paralysis of right lower extremity  Right upper extremity with 4/5 pain in the distal arm and 3/5 strength in the proximal at/upper right arm       No deficits to sensation to light touch in the upper and lower extremities bilaterally      Cranial nerves and visual fields intact  Patient alert and oriented to person place and time  Patient able to name objects  Left upper and left lower extremities without deficits to sensation or motor  Skin: Skin is warm and dry  Capillary refill takes less than 2 seconds  No rash noted  He is not diaphoretic  No erythema  No pallor  Psychiatric: He has a normal mood and affect  His behavior is normal  Judgment and thought content normal    Nursing note and vitals reviewed        ED Medications  Medications   iohexol (OMNIPAQUE) 350 MG/ML injection (MULTI-DOSE) 85 mL (85 mL Intravenous Given 3/12/20 2335)       Diagnostic Studies  Results Reviewed     Procedure Component Value Units Date/Time    Troponin I [[id number]]  (Normal) Collected:  03/12/20 2332    Lab Status:  Final result Specimen:  Blood from Arm, Right Updated:  03/12/20 2358     Troponin I <0 02 ng/mL     Protime-INR [[id number]]  (Abnormal) Collected:  03/12/20 2332    Lab Status:  Final result Specimen:  Blood from Arm, Right Updated:  03/12/20 2356     Protime 20 4 seconds      INR 1 80    APTT [[id number]]  (Normal) Collected:  03/12/20 2332    Lab Status:  Final result Specimen:  Blood from Arm, Right Updated:  03/12/20 2356     PTT 37 seconds     Basic metabolic panel [[id number]]  (Abnormal) Collected:  03/12/20 2332    Lab Status:  Final result Specimen:  Blood from Arm, Right Updated:  03/12/20 2356     Sodium 138 mmol/L      Potassium 3 5 mmol/L      Chloride 106 mmol/L      CO2 29 mmol/L      ANION GAP 3 mmol/L      BUN 13 mg/dL      Creatinine 1 07 mg/dL      Glucose 129 mg/dL      Calcium 8 7 mg/dL      eGFR 75 ml/min/1 73sq m     Narrative:       Sarah guidelines for Chronic Kidney Disease (CKD):     Stage 1 with normal or high GFR (GFR > 90 mL/min/1 73 square meters)    Stage 2 Mild CKD (GFR = 60-89 mL/min/1 73 square meters)    Stage 3A Moderate CKD (GFR = 45-59 mL/min/1 73 square meters)    Stage 3B Moderate CKD (GFR = 30-44 mL/min/1 73 square meters)    Stage 4 Severe CKD (GFR = 15-29 mL/min/1 73 square meters)    Stage 5 End Stage CKD (GFR <15 mL/min/1 73 square meters)  Note: GFR calculation is accurate only with a steady state creatinine    CBC and Platelet [[id number]]  (Normal) Collected:  03/12/20 2332    Lab Status:  Final result Specimen:  Blood from Arm, Right Updated:  03/12/20 2341     WBC 8 32 Thousand/uL      RBC 5 08 Million/uL      Hemoglobin 15 3 g/dL      Hematocrit 44 4 %      MCV 87 fL      MCH 30 1 pg      MCHC 34 5 g/dL      RDW 13 0 %      Platelets 384 Thousands/uL      MPV 9 7 fL     Fingerstick Glucose (POCT) [[id number]]  (Normal) Collected:  03/12/20 2330    Lab Status:  Final result Updated:  03/12/20 2331     POC Glucose 120 mg/dl                  X-ray chest 1 view portable   ED Interpretation by Hiwot Velázquez MD (03/13 0018)   Limited chest x-ray secondary to poor inspiratory effort and technique  No obvious abnormality  CTA stroke alert (head/neck)   Final Result by Karon Henderson DO (03/13 0017)      No acute intracranial process is seen  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 (axial image 171, series 6 and sagittal image 55, series 602)  Moderate narrowing of the anterior M2 branch on the right at the origin (axial image 169, series 6)  Moderate narrowing of the distal P1 segment on the right and mild narrowing of the proximal P1 segment on the left  No occlusion or severe stenosis of the major arteries of the head and neck seen  No stenosis within either internal carotid artery  Patent bilateral vertebral arteries  Sinus disease as described  Other findings as above  Findings discussed with Dr Dwayne Mack by Dr Maggie Arora at 12:10 AM on 3/13/2020                 Workstation performed: [id number]         CT stroke alert brain   Final Result by Karon Henderson DO (03/13 0017)      No acute intracranial process is seen  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 (axial image 171, series 6 and sagittal image 55, series 602)  Moderate narrowing of the anterior M2 branch on the right at the origin (axial image 169, series 6)  Moderate narrowing of the distal P1 segment on the right and mild narrowing of the proximal P1 segment on the left  No occlusion or severe stenosis of the major arteries of the head and neck seen  No stenosis within either internal carotid artery  Patent bilateral vertebral arteries  Sinus disease as described  Other findings as above  Findings discussed with Dr Ketty Saravia by Dr Radha Ayala at 12:10 AM on 3/13/2020  Workstation performed: [id number]               Procedures  Procedures      ED Course             Stroke Assessment     Row Name 03/12/20 1322             NIH Stroke Scale    Interval  Baseline      Level of Consciousness (1a )  0      LOC Questions (1b )  0      LOC Commands (1c )  0      Best Gaze (2 )  0      Visual (3 )  0      Facial Palsy (4 )  0      Motor Arm, Left (5a )  0      Motor Arm, Right (5b )  2      Motor Leg, Left (6a )  0      Motor Leg, Right (6b )  4      Limb Ataxia (7 )  0      Sensory (8 )  0      Best Language (9 )  0      Dysarthria (10 )  0      Extinction and Inattention (11 ) (Formerly Neglect)  0      Total  6          First Filed Value   TPA Decision  Patient not a TPA candidate  Patient is not a candidate options  Bleeding risk  [Patient on Xeralto]                          MDM  Number of Diagnoses or Management Options  Cerebrovascular accident (CVA) due to embolism of cerebral artery [facility]): new and requires workup  Right sided weakness: new and requires workup  Diagnosis management comments: 10year-old male presenting emergency department as a stroke alert    Patient had acute onset of right lower extremity flaccid paralysis and right upper extremity weakness with 3/5 muscular strength in the right upper extremity  NIH stroke score of 6  Patient is within the tPA window but is not a candidate for tPA secondary to anticoagulation on Xarelto  Patient underwent CT and CTA for possible evaluation of clot retrieval     Patient does not have occlusion of a major vessel that could be addressed with clot retrieval     Patient will be admitted under stroke pathway  Patient admitted to slim  Amount and/or Complexity of Data Reviewed  Clinical lab tests: ordered and reviewed  Tests in the radiology section of CPT®: ordered and reviewed  Decide to obtain previous medical records or to obtain history from someone other than the patient: yes  Review and summarize past medical records: yes  Discuss the patient with other providers: yes  Independent visualization of images, tracings, or specimens: yes    Risk of Complications, Morbidity, and/or Mortality  Presenting problems: high  Diagnostic procedures: high  Management options: high    Patient Progress  Patient progress: stable        Disposition  Final diagnoses:   Right sided weakness   Cerebrovascular accident (CVA) due to embolism of cerebral artery ([address] )     Time reflects when diagnosis was documented in both MDM as applicable and the Disposition within this note     Time User Action Codes Description Comment    3/12/2020 11:20 PM Pedro Salazar, [address]  [R53 1] Right sided weakness     3/13/2020  1:23 AM Dontrell Roe Add [I63 40] Cerebrovascular accident (CVA) due to embolism of cerebral artery [facility])       ED Disposition     ED Disposition Condition Date/Time Comment    Admit Stable Fri Mar 13, 2020  1:23 AM Case was discussed with Dr Sukumar Eaton and the patient's admission status was agreed to be Admission Status: inpatient status to the service of Dr Sukumar Eaton           Follow-up Information    None         Current Discharge Medication List CONTINUE these medications which have NOT CHANGED    Details   amLODIPine (NORVASC) 5 mg tablet TAKE 1 TABLET BY MOUTH EVERY DAY  Qty: 90 tablet, Refills: 0    Comments: DX Code Needed    Associated Diagnoses: Essential hypertension      amoxicillin (AMOXIL) 500 mg capsule TAKE 4 CAPSULES 1 HOUR PRIOR TO APPOINTMENT  Refills: 1      cholecalciferol (VITAMIN D3) 1,000 units tablet Take 1 tablet (1,000 Units total) by mouth daily  Qty: 30 tablet, Refills: 11    Associated Diagnoses: Vitamin D deficiency      metoprolol succinate (TOPROL-XL) 50 mg 24 hr tablet Take 3 tablets (150 mg total) by mouth daily  Qty: 270 tablet, Refills: 1    Associated Diagnoses: Essential hypertension      pantoprazole (PROTONIX) 40 mg tablet TAKE 1 TABLET (40 MG TOTAL) BY MOUTH DAILY  Qty: 90 tablet, Refills: 0    Associated Diagnoses: Gastroesophageal reflux disease without esophagitis      rivaroxaban (XARELTO) 20 mg tablet Take 1 tablet (20 mg total) by mouth daily with breakfast  Qty: 30 tablet, Refills: 5    Associated Diagnoses: Paroxysmal atrial fibrillation (HCC)      rosuvastatin (CRESTOR) 20 MG tablet TAKE 1 TABLET BY MOUTH EVERY DAY  Qty: 90 tablet, Refills: 1    Associated Diagnoses: Mixed hyperlipidemia           No discharge procedures on file  PDMP Review     None           ED Provider  Attending physically available and evaluated Linus Seo I managed the patient along with the ED Attending      Electronically Signed by         Almas Morgan DO  03/13/20 4595

## 2020-03-13 NOTE — PLAN OF CARE
Problem: PHYSICAL THERAPY ADULT  Goal: Performs mobility at highest level of function for planned discharge setting  See evaluation for individualized goals  Description  Treatment/Interventions: Functional transfer training, LE strengthening/ROM, Therapeutic exercise, Endurance training, Patient/family training, Bed mobility, Gait training  Equipment Recommended: Wheelchair, Halina Smith       See flowsheet documentation for full assessment, interventions and recommendations  Note:   Prognosis: Guarded  Problem List: Decreased strength, Decreased endurance, Decreased range of motion, Impaired balance, Decreased mobility, Decreased coordination, Impaired judgement, Decreased safety awareness  Assessment: Pt is 61 y o  male seen for high complexity PT evaluation s/p admission to [facility] on 3/13/20 with acute onset of right sided weakness and fall due to flaccid right LE  tPA (-)  Comorbidities affecting pt's physical performance at time of assessment include: PAF, h/o aortic aneurysm repair, hyperlipidemia, history of CVA, class 2 obesity, and essential HTN  CTA (-) for acute intracranial process  PTA, pt independent with all functional mobility, ADLs, and IADLs without DME  Pt resides with sister in a [address] with 6 [address]LDON and FF to 2nd floor bed/bathroom  Decreased RLE strength compared to LLE, minimal visible voluntary movement in RLE but noted palpable contraction in most muscle groups  Sensation symmetrical in both LE  Required max A x 2 for stand pivot transfer, required physical assistance to move RLE to initiate step  Patient's decreased mobility level and increased fall risk is secondary to deficits in RLE weakness, RLE AROM, coordination, cognition, safety, judgement, gait, lucien, standing/ambulatory balance, trunk control, self-limiting behavior?, activity tolerance, and endurance   Current clinical presentation is unstable/unpredictable seen in pt's presentation of ongoing medical management, increased reliance on assistance compared to PLOF, impaired judgement/safety awareness, and significant PMH  Pt to benefit from continued PT tx to address deficits as defined above and maximize level of functional independent mobility and consistency  From PT/mobility standpoint, recommendation at time of d/c would be STR pending progress in order to facilitate return to PLOF  Barriers to Discharge: Decreased caregiver support, Inaccessible home environment     Recommendation: Post acute IP rehab     PT - OK to Discharge: Yes(to rehab once medically stable)    See flowsheet documentation for full assessment

## 2020-03-13 NOTE — SOCIAL WORK
Pt discussed during care coordination rounds & is expected for dc either Sunday or Monday pending diagnostic tests  PM&R consult completed & PT notes are in  OT notes will be in shortly  Both PT & OT aware to have pt seen on Sunday  Ecin message sent to [facility] whom is reviewing  Per SLIM, awaiting MRI & echo results

## 2020-03-13 NOTE — CONSULTS
PHYSICAL MEDICINE AND REHABILITATION CONSULT NOTE  Trace Fried 61 y o  male MRN: [id number]  [facility] Encounter: [id number]    Requested by (Physician/Service): Nahum Santos MD  Reason for Consultation:  Assessment of rehabilitation needs    Assessment:  Rehabilitation Diagnosis:    CVA    Recommendations:  Rehabilitation Plan:   PT/OT evaluations pending   Recommend multi-podus boot to RLE   The patient will likely be a good candidate for acute inpatient rehabilitation once medically stable  Medical Co-morbidities Plan:  · CVA  · Right hemiparesis  · Afib on Xarelto  · HTN  · CKD  · HLD  · Prior CVA  · DVT ppx:  xarelto and SCD    Thank you for this consultation  Do not hesitate to contact service with further questions  DONELL Fox  PM&R    History of Present Illness:  Chelle Stanford is a 61 y o  male with a PMH of CKD, HTN, HLD, CVA and TIA who presented to the [facility] on 3/13/2020 with acute right sided weakness  He does have a recent history of CVA 1/20 and recently placed on Xarelto for hx of afib  CT and CTA showed no acute intracranial process  MRI is pending  PM&R are consulted for rehabilitation recommendations  The patient was seen in his room  He report right sided weakness that is proximal > distal   He also had left sided facial numbness but that has improved  He denies all other complaints  Review of Systems: 10 point ROS negative except for what is noted in HPI    Function:  Prior level of function and living situation:  The patient lives with his sister in a 2 story home with 6 SHELDON  He reports that bathroom is on the 2nd floor  His sister can provide supervision but not physically assist as she is older        Current level of function:  Physical Therapy:  Pending  Occupational Therapy:  Pending      Physical Exam:  /99   Pulse 72   Temp 98 3 °F (36 8 °C)   Resp 16   Ht 5' 8" (1 727 m)   Wt 113 kg (248 lb 7 3 oz)   SpO2 97%   BMI 37 78 kg/m²        Intake/Output Summary (Last 24 hours) at 3/13/2020 1048  Last data filed at 3/13/2020 0854  Gross per 24 hour   Intake 260 ml   Output 1025 ml   Net -765 ml       Body mass index is 37 78 kg/m²  Physical Exam   Constitutional: He is oriented to person, place, and time  HENT:   Head: Normocephalic and atraumatic  Eyes: Pupils are equal, round, and reactive to light  EOM are normal    Cardiovascular: Normal rate  Pulmonary/Chest: Effort normal    Musculoskeletal:   Right hemiparesis   Neurological: He is alert and oriented to person, place, and time  Skin: Skin is warm and dry  Psychiatric: He has a normal mood and affect        Social History:    Social History     Socioeconomic History    Marital status:      Spouse name: None    Number of children: None    Years of education: None    Highest education level: None   Occupational History    Occupation: disabled     Social Needs    Financial resource strain: None    Food insecurity:     Worry: None     Inability: None    Transportation needs:     Medical: None     Non-medical: None   Tobacco Use    Smoking status: Never Smoker    Smokeless tobacco: Never Used    Tobacco comment: no second hand smoke exposure   Substance and Sexual Activity    Alcohol use: Not Currently    Drug use: No    Sexual activity: None   Lifestyle    Physical activity:     Days per week: None     Minutes per session: None    Stress: None   Relationships    Social connections:     Talks on phone: None     Gets together: None     Attends Shinto service: None     Active member of club or organization: None     Attends meetings of clubs or organizations: None     Relationship status: None    Intimate partner violence:     Fear of current or ex partner: None     Emotionally abused: None     Physically abused: None     Forced sexual activity: None   Other Topics Concern    None   Social History Narrative    Caffeine use    Completed some college     as per [employer]        Family History:    Family History   Problem Relation Age of Onset    Diverticulitis Mother         of colon    Nephrolithiasis Mother     Emphysema Father     Nephrolithiasis Sister     Heart attack Maternal Grandfather 72    Breast cancer Paternal Grandmother     Lung cancer Paternal Grandfather     Cancer Family     Coronary artery disease Family     Diabetes Family         sibling    Hypertension Family         sibling    Hernia Family         sibling         Medications:     Current Facility-Administered Medications:     amLODIPine (NORVASC) tablet 5 mg, 5 mg, Oral, Daily, Duong Belel MD, 5 mg at 03/13/20 0907    aspirin chewable tablet 81 mg, 81 mg, Oral, Daily, Duong Belle MD, 81 mg at 03/13/20 0906    atorvastatin (LIPITOR) tablet 40 mg, 40 mg, Oral, Daily With Anne-Marie Montano MD    cholecalciferol (VITAMIN D3) tablet 1,000 Units, 1,000 Units, Oral, Daily, Duong Belle MD, 1,000 Units at 03/13/20 0907    docusate sodium (COLACE) capsule 100 mg, 100 mg, Oral, BID, Duong Belle MD, 100 mg at 03/13/20 0907    metoprolol succinate (TOPROL-XL) 24 hr tablet 150 mg, 150 mg, Oral, Daily, Duong Belle MD, 150 mg at 03/13/20 0906    ondansetron (ZOFRAN) injection 4 mg, 4 mg, Intravenous, Q6H PRN, Duong Belle MD    pantoprazole (PROTONIX) EC tablet 40 mg, 40 mg, Oral, Early Morning, Duong Belle MD, 40 mg at 03/13/20 0605    rivaroxaban (XARELTO) tablet 20 mg, 20 mg, Oral, Daily With Breakfast, Duong Belle MD, 20 mg at 03/13/20 0908    senna (SENOKOT) tablet 8 6 mg, 1 tablet, Oral, Daily, Duong Belle MD, 8 6 mg at 03/13/20 0907    sodium chloride 0 9 % infusion, 75 mL/hr, Intravenous, Continuous, Duong Belle MD, Last Rate: 75 mL/hr at 03/13/20 0327, 75 mL/hr at 03/13/20 0327    Past Medical History:     Past Medical History:   Diagnosis Date    Aneurysm of thoracic aorta ([facility] [address] )     last assessed 11/20/17    Anxiety     currently on no meds    Cardiac disease     Cholelithiasis     Chronic kidney disease     GERD (gastroesophageal reflux disease)     Headache due to anesthesia     Hyperlipidemia     Hypertension     Irritable bowel syndrome     Kidney stone     Palpitations     PONV (postoperative nausea and vomiting)     Shortness of breath     ? related to acid reflux    Sleep apnea     not sure    Stroke [facility]) 11/2014    TIA (transient ischemic attack)         Past Surgical History:     Past Surgical History:   Procedure Laterality Date    AORTA SURGERY      thoracic - aneurysmorrhaphy    APPENDECTOMY      ASCENDING AORTIC ANEURYSM REPAIR      resolved 8/19/15    CHOLECYSTECTOMY      laparoscopic    CYSTOSCOPY      with removal of object- stent removal    KNEE ARTHROSCOPY Right 1996    with medial meniscus repair    LITHOTRIPSY      renal    OR FRAGMENT KIDNEY STONE/ ESWL Left 5/17/2018    Procedure: LITHROTRIPSY EXTRACORPORAL SHOCKWAVE (ESWL); Surgeon: Barby Reeves MD;  Location: [facility]  MAIN [facility];  Service: Urology    THUMB ARTHROSCOPY Right 1976    ligament repair         Allergies: Allergies   Allergen Reactions    Losartan Angioedema    Bactrim [Sulfamethoxazole-Trimethoprim]     Lisinopril      Felt bad, was OK with Zestril brand name      Tramadol            LABORATORY RESULTS:      Lab Results   Component Value Date    HGB 14 9 03/13/2020    HGB 14 1 03/29/2017    HCT 43 2 03/13/2020    HCT 43 2 03/29/2017    WBC 8 10 03/13/2020    WBC 8 1 03/29/2017    WBC 0-5 03/29/2017     Lab Results   Component Value Date    BUN 10 03/13/2020    BUN 10 02/02/2018     03/29/2017    K 3 4 (L) 03/13/2020    K 3 8 02/02/2018     03/13/2020     02/02/2018    GLUCOSE 103 12/08/2014    CREATININE 0 97 03/13/2020    CREATININE 1 18 03/29/2017     Lab Results   Component Value Date    PROTIME 20 4 (H) 03/12/2020 PROTIME 13 9 11/13/2014    INR 1 80 (H) 03/12/2020    INR 1 09 11/13/2014        DIAGNOSTIC STUDIES: Reviewed  X-ray Chest 1 View Portable    Result Date: 3/13/2020  Impression: Suboptimal inspiration with mild cardiomegaly  No acute cardiopulmonary disease  Workstation performed: [id number]     Ct Stroke Alert Brain    Result Date: 3/13/2020  Impression: No acute intracranial process is seen  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 (axial image 171, series 6 and sagittal image 55, series 602)  Moderate narrowing of the anterior M2 branch on the right at the origin (axial image 169, series 6)  Moderate narrowing of the distal P1 segment on the right and mild narrowing of the proximal P1 segment on the left  No occlusion or severe stenosis of the major arteries of the head and neck seen  No stenosis within either internal carotid artery  Patent bilateral vertebral arteries  Sinus disease as described  Other findings as above  Findings discussed with Dr Alaina Kawasaki by Dr Ayden Fleming at 12:10 AM on 3/13/2020  Workstation performed: [id number]     Cta Stroke Alert (head/neck)    Result Date: 3/13/2020  Impression: No acute intracranial process is seen  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 (axial image 171, series 6 and sagittal image 55, series 602)  Moderate narrowing of the anterior M2 branch on the right at the origin (axial image 169, series 6)  Moderate narrowing of the distal P1 segment on the right and mild narrowing of the proximal P1 segment on the left  No occlusion or severe stenosis of the major arteries of the head and neck seen  No stenosis within either internal carotid artery  Patent bilateral vertebral arteries  Sinus disease as described  Other findings as above  Findings discussed with Dr Alaina Kawasaki by Dr Ayden Fleming at 12:10 AM on 3/13/2020   Workstation performed: [id number]

## 2020-03-13 NOTE — PLAN OF CARE
Problem: Potential for Falls  Goal: Patient will remain free of falls  Description  INTERVENTIONS:  - Assess patient frequently for physical needs  -  Identify cognitive and physical deficits and behaviors that affect risk of falls    -  [place] fall precautions as indicated by assessment   - Educate patient/family on patient safety including physical limitations  - Instruct patient to call for assistance with activity based on assessment  - Modify environment to reduce risk of injury  - Consider OT/PT consult to assist with strengthening/mobility  Outcome: Progressing     Problem: PAIN - ADULT  Goal: Verbalizes/displays adequate comfort level or baseline comfort level  Description  Interventions:  - Encourage patient to monitor pain and request assistance  - Assess pain using appropriate pain scale  - Administer analgesics based on type and severity of pain and evaluate response  - Implement non-pharmacological measures as appropriate and evaluate response  - Consider cultural and social influences on pain and pain management  - Notify physician/advanced practitioner if interventions unsuccessful or patient reports new pain  Outcome: Progressing     Problem: SAFETY ADULT  Goal: Maintain or return to baseline ADL function  Description  INTERVENTIONS:  -  Assess patient's ability to carry out ADLs; assess patient's baseline for ADL function and identify physical deficits which impact ability to perform ADLs (bathing, care of mouth/teeth, toileting, grooming, dressing, etc )  - Assess/evaluate cause of self-care deficits   - Assess range of motion  - Assess patient's mobility; develop plan if impaired  - Assess patient's need for assistive devices and provide as appropriate  - Encourage maximum independence but intervene and supervise when necessary  - Involve family in performance of ADLs  - Assess for home care needs following discharge   - Consider OT consult to assist with ADL evaluation and planning for discharge  - Provide patient education as appropriate  Outcome: Progressing  Goal: Maintain or return mobility status to optimal level  Description  INTERVENTIONS:  - Assess patient's baseline mobility status (ambulation, transfers, stairs, etc )    - Identify cognitive and physical deficits and behaviors that affect mobility  - Identify mobility aids required to assist with transfers and/or ambulation (gait belt, sit-to-stand, lift, walker, cane, etc )  - [place] fall precautions as indicated by assessment  - Record patient progress and toleration of activity level on Mobility SBAR; progress patient to next Phase/Stage  - Instruct patient to call for assistance with activity based on assessment  - Consider rehabilitation consult to assist with strengthening/weightbearing, etc   Outcome: Progressing     Problem: DISCHARGE PLANNING  Goal: Discharge to home or other facility with appropriate resources  Description  INTERVENTIONS:  - Identify barriers to discharge w/patient and caregiver  - Arrange for needed discharge resources and transportation as appropriate  - Identify discharge learning needs (meds, wound care, etc )  - Arrange for interpretive services to assist at discharge as needed  - Refer to Case Management Department for coordinating discharge planning if the patient needs post-hospital services based on physician/advanced practitioner order or complex needs related to functional status, cognitive ability, or social support system  Outcome: Progressing     Problem: Knowledge Deficit  Goal: Patient/family/caregiver demonstrates understanding of disease process, treatment plan, medications, and discharge instructions  Description  Complete learning assessment and assess knowledge base    Interventions:  - Provide teaching at level of understanding  - Provide teaching via preferred learning methods  Outcome: Progressing     Problem: NEUROSENSORY - ADULT  Goal: Achieves stable or improved neurological status  Description  INTERVENTIONS  - Monitor and report changes in neurological status  - Monitor vital signs such as temperature, blood pressure, glucose, and any other labs ordered   - Initiate measures to prevent increased intracranial pressure  - Monitor for seizure activity and implement precautions if appropriate      Outcome: Progressing  Goal: Achieves maximal functionality and self care  Description  INTERVENTIONS  - Monitor swallowing and airway patency with patient fatigue and changes in neurological status  - Encourage and assist patient to increase activity and self care     - Encourage visually impaired, hearing impaired and aphasic patients to use assistive/communication devices  Outcome: Progressing     Problem: MUSCULOSKELETAL - ADULT  Goal: Maintain or return mobility to safest level of function  Description  INTERVENTIONS:  - Assess patient's ability to carry out ADLs; assess patient's baseline for ADL function and identify physical deficits which impact ability to perform ADLs (bathing, care of mouth/teeth, toileting, grooming, dressing, etc )  - Assess/evaluate cause of self-care deficits   - Assess range of motion  - Assess patient's mobility  - Assess patient's need for assistive devices and provide as appropriate  - Encourage maximum independence but intervene and supervise when necessary  - Involve family in performance of ADLs  - Assess for home care needs following discharge   - Consider OT consult to assist with ADL evaluation and planning for discharge  - Provide patient education as appropriate  Outcome: Progressing

## 2020-03-13 NOTE — RESTORATIVE TECHNICIAN NOTE
Restorative Specialist Mobility Note       Activity: Ambulate in room, Chair, Dangle, Stand at bedside(Educated/encouraged pt to ambulate with assistance 3-4 x's/day  Chair alarm on   Pt callbell, phone/tray within reach )     Assistive Device: Other (Comment)(HHA x2 OOB to the chair )          Adriana CESAR, Restorative Technician, [employer] [facility]

## 2020-03-13 NOTE — QUICK NOTE
Reviewed chart, patient on stroke pathway  Attempted to meet with patient to discuss follow up care, stroke education, and discharge planning  Patient getting ECHO at this time  Left stroke education binder and my card at bedside  Will follow up

## 2020-03-13 NOTE — ED ATTENDING ATTESTATION
3/12/2020  Rosalio STONE MD, saw and evaluated the patient  I have discussed the patient with the resident/non-physician practitioner and agree with the resident's/non-physician practitioner's findings, Plan of Care, and MDM as documented in the resident's/non-physician practitioner's note, except where noted  All available labs and Radiology studies were reviewed  I was present for key portions of any procedure(s) performed by the resident/non-physician practitioner and I was immediately available to provide assistance  At this point I agree with the current assessment done in the Emergency Department  I have conducted an independent evaluation of this patient a history and physical is as follows:    ED Course     70-year-old male, history of CVA and subsequent TIAs, no reported residual symptoms, takes Xarelto, presenting to the emergency department for evaluation of right upper extremity and right lower extremity weakness  Symptoms were noticed when the patient was attempting to get out of the bed to go to the bathroom approximately 25 minutes prior to arrival to the emergency department  Patient's last known well time was 21:30  Patient is reporting some pain at the base of the skull posteriorly  Patient denies any chest pain or shortness of breath  No abdominal pain  No nausea or vomiting  No difficulty speaking or swallowing  Ten systems reviewed and negative except as noted in the history of present illness  On examination head is normocephalic and atraumatic  Eyelids lashes are normal   Mucous membranes are moist   Neck is supple  Chest is clear to auscultation bilaterally  Heart is regular rate rhythm with no murmurs rubs or gallops  Belly is soft and nontender  Extremities are unremarkable in appearance  Cranial nerves intact  Speech in orientation intact  Patient has some drift of the right upper extremity  Patient is flaccid in the right lower extremity      Assessment and plan:  61-year-old male presenting to the emergency department with right-sided weakness  Patient is not a tPA candidate secondary to taking Xarelto  Labs Reviewed   BASIC METABOLIC PANEL - Abnormal       Result Value Ref Range Status    Sodium 138  136 - 145 mmol/L Final    Potassium 3 5  3 5 - 5 3 mmol/L Final    Chloride 106  100 - 108 mmol/L Final    CO2 29  21 - 32 mmol/L Final    ANION GAP 3 (*) 4 - 13 mmol/L Final    BUN 13  5 - 25 mg/dL Final    Creatinine 1 07  0 60 - 1 30 mg/dL Final    Comment: Standardized to IDMS reference method    Glucose 129  65 - 140 mg/dL Final    Comment:   If the patient is fasting, the ADA then defines impaired fasting glucose as > 100 mg/dL and diabetes as > or equal to 123 mg/dL  Specimen collection should occur prior to Sulfasalazine administration due to the potential for falsely depressed results  Specimen collection should occur prior to Sulfapyridine administration due to the potential for falsely elevated results      Calcium 8 7  8 3 - 10 1 mg/dL Final    eGFR 75  ml/min/1 73sq m Final    Narrative:     National Kidney Disease Foundation guidelines for Chronic Kidney Disease (CKD):     Stage 1 with normal or high GFR (GFR > 90 mL/min/1 73 square meters)    Stage 2 Mild CKD (GFR = 60-89 mL/min/1 73 square meters)    Stage 3A Moderate CKD (GFR = 45-59 mL/min/1 73 square meters)    Stage 3B Moderate CKD (GFR = 30-44 mL/min/1 73 square meters)    Stage 4 Severe CKD (GFR = 15-29 mL/min/1 73 square meters)    Stage 5 End Stage CKD (GFR <15 mL/min/1 73 square meters)  Note: GFR calculation is accurate only with a steady state creatinine   PROTIME-INR - Abnormal    Protime 20 4 (*) 11 6 - 14 5 seconds Final    INR 1 80 (*) 0 84 - 1 19 Final   CBC AND PLATELET - Normal    WBC 8 32  4 31 - 10 16 Thousand/uL Final    RBC 5 08  3 88 - 5 62 Million/uL Final    Hemoglobin 15 3  12 0 - 17 0 g/dL Final    Hematocrit 44 4  36 5 - 49 3 % Final    MCV 87  82 - 98 fL Final MCH 30 1  26 8 - 34 3 pg Final    MCHC 34 5  31 4 - 37 4 g/dL Final    RDW 13 0  11 6 - 15 1 % Final    Platelets 057  744 - 390 Thousands/uL Final    MPV 9 7  8 9 - 12 7 fL Final   APTT - Normal    PTT 37  23 - 37 seconds Final    Comment: Therapeutic Heparin Range =  60-90 seconds   TROPONIN I - Normal    Troponin I <0 02  <=0 04 ng/mL Final    Comment:   [employer] Chemistry analyzer 99% cutoff is > 0 04 ng/mL in network labs     o cTnI 99% cutoff is useful only when applied to patients in the clinical setting of myocardial ischemia   o cTnI 99% cutoff should be interpreted in the context of clinical history, ECG findings and possibly cardiac imaging to establish correct diagnosis  o cTnI 99% cutoff may be suggestive but clearly not indicative of a coronary event without the clinical setting of myocardial ischemia  POCT GLUCOSE - Normal    POC Glucose 120  65 - 140 mg/dl Final       X-ray chest 1 view portable   ED Interpretation   Limited chest x-ray secondary to poor inspiratory effort and technique  No obvious abnormality  Final Result      Suboptimal inspiration with mild cardiomegaly  No acute cardiopulmonary disease  Workstation performed: [id number]         CTA stroke alert (head/neck)   Final Result      No acute intracranial process is seen  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 (axial image 171, series 6 and sagittal image 55, series 602)  Moderate narrowing of the anterior M2 branch on the right at the origin (axial image 169, series 6)  Moderate narrowing of the distal P1 segment on the right and mild narrowing of the proximal P1 segment on the left  No occlusion or severe stenosis of the major arteries of the head and neck seen  No stenosis within either internal carotid artery  Patent bilateral vertebral arteries  Sinus disease as described  Other findings as above  Findings discussed with Dr Monique Hamlin by Dr Shelton Richardson at 12:10 AM on 3/13/2020  Workstation performed: [id number]         CT stroke alert brain   Final Result      No acute intracranial process is seen  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 (axial image 171, series 6 and sagittal image 55, series 602)  Moderate narrowing of the anterior M2 branch on the right at the origin (axial image 169, series 6)  Moderate narrowing of the distal P1 segment on the right and mild narrowing of the proximal P1 segment on the left  No occlusion or severe stenosis of the major arteries of the head and neck seen  No stenosis within either internal carotid artery  Patent bilateral vertebral arteries  Sinus disease as described  Other findings as above  Findings discussed with Dr Monique Hamlin by Dr Shelton Richardson at 12:10 AM on 3/13/2020                 Workstation performed: [id number]         MRI brain wo contrast    (Results Pending)           Critical Care Time  CriticalCare Time  Performed by: Mehran Foote MD  Authorized by: Mehran Foote MD     Critical care provider statement:     Critical care time (minutes):  34    Critical care time was exclusive of:  Separately billable procedures and treating other patients and teaching time    Critical care was necessary to treat or prevent imminent or life-threatening deterioration of the following conditions:  CNS failure or compromise    Critical care was time spent personally by me on the following activities:  Obtaining history from patient or surrogate, development of treatment plan with patient or surrogate, discussions with consultants, examination of patient, review of old charts, ordering and review of radiographic studies and ordering and review of laboratory studies

## 2020-03-14 ENCOUNTER — APPOINTMENT (INPATIENT)
Dept: RADIOLOGY | Facility: HOSPITAL | Age: 60
DRG: 041 | End: 2020-03-14
Payer: COMMERCIAL

## 2020-03-14 PROBLEM — I63.522 ACUTE ISCHEMIC LEFT ANTERIOR CEREBRAL ARTERY (ACA) STROKE (HCC): Status: ACTIVE | Noted: 2020-03-13

## 2020-03-14 LAB
ALBUMIN SERPL BCP-MCNC: 3.6 G/DL (ref 3.5–5)
ALP SERPL-CCNC: 125 U/L (ref 46–116)
ALT SERPL W P-5'-P-CCNC: 33 U/L (ref 12–78)
ANION GAP SERPL CALCULATED.3IONS-SCNC: 6 MMOL/L (ref 4–13)
AST SERPL W P-5'-P-CCNC: 19 U/L (ref 5–45)
BASOPHILS # BLD AUTO: 0.04 THOUSANDS/ΜL (ref 0–0.1)
BASOPHILS NFR BLD AUTO: 0 % (ref 0–1)
BILIRUB SERPL-MCNC: 1.33 MG/DL (ref 0.2–1)
BUN SERPL-MCNC: 11 MG/DL (ref 5–25)
CALCIUM SERPL-MCNC: 8.8 MG/DL (ref 8.3–10.1)
CHLORIDE SERPL-SCNC: 108 MMOL/L (ref 100–108)
CO2 SERPL-SCNC: 27 MMOL/L (ref 21–32)
CREAT SERPL-MCNC: 1.01 MG/DL (ref 0.6–1.3)
EOSINOPHIL # BLD AUTO: 0.39 THOUSAND/ΜL (ref 0–0.61)
EOSINOPHIL NFR BLD AUTO: 4 % (ref 0–6)
ERYTHROCYTE [DISTWIDTH] IN BLOOD BY AUTOMATED COUNT: 13.2 % (ref 11.6–15.1)
GFR SERPL CREATININE-BSD FRML MDRD: 80 ML/MIN/1.73SQ M
GLUCOSE SERPL-MCNC: 127 MG/DL (ref 65–140)
HCT VFR BLD AUTO: 44.4 % (ref 36.5–49.3)
HGB BLD-MCNC: 14.8 G/DL (ref 12–17)
IMM GRANULOCYTES # BLD AUTO: 0.05 THOUSAND/UL (ref 0–0.2)
IMM GRANULOCYTES NFR BLD AUTO: 1 % (ref 0–2)
INR PPP: 1.22 (ref 0.84–1.19)
LYMPHOCYTES # BLD AUTO: 1.75 THOUSANDS/ΜL (ref 0.6–4.47)
LYMPHOCYTES NFR BLD AUTO: 16 % (ref 14–44)
MCH RBC QN AUTO: 29.8 PG (ref 26.8–34.3)
MCHC RBC AUTO-ENTMCNC: 33.3 G/DL (ref 31.4–37.4)
MCV RBC AUTO: 89 FL (ref 82–98)
MONOCYTES # BLD AUTO: 0.63 THOUSAND/ΜL (ref 0.17–1.22)
MONOCYTES NFR BLD AUTO: 6 % (ref 4–12)
NEUTROPHILS # BLD AUTO: 8.14 THOUSANDS/ΜL (ref 1.85–7.62)
NEUTS SEG NFR BLD AUTO: 73 % (ref 43–75)
NRBC BLD AUTO-RTO: 0 /100 WBCS
PLATELET # BLD AUTO: 178 THOUSANDS/UL (ref 149–390)
PMV BLD AUTO: 10.2 FL (ref 8.9–12.7)
POTASSIUM SERPL-SCNC: 3.9 MMOL/L (ref 3.5–5.3)
PROT SERPL-MCNC: 7.1 G/DL (ref 6.4–8.2)
PROTHROMBIN TIME: 15 SECONDS (ref 11.6–14.5)
RBC # BLD AUTO: 4.97 MILLION/UL (ref 3.88–5.62)
SODIUM SERPL-SCNC: 141 MMOL/L (ref 136–145)
WBC # BLD AUTO: 11 THOUSAND/UL (ref 4.31–10.16)

## 2020-03-14 PROCEDURE — 85670 THROMBIN TIME PLASMA: CPT | Performed by: PSYCHIATRY & NEUROLOGY

## 2020-03-14 PROCEDURE — 85025 COMPLETE CBC W/AUTO DIFF WBC: CPT | Performed by: INTERNAL MEDICINE

## 2020-03-14 PROCEDURE — 85610 PROTHROMBIN TIME: CPT | Performed by: HOSPITALIST

## 2020-03-14 PROCEDURE — 85732 THROMBOPLASTIN TIME PARTIAL: CPT | Performed by: PSYCHIATRY & NEUROLOGY

## 2020-03-14 PROCEDURE — 85613 RUSSELL VIPER VENOM DILUTED: CPT | Performed by: PSYCHIATRY & NEUROLOGY

## 2020-03-14 PROCEDURE — 80053 COMPREHEN METABOLIC PANEL: CPT | Performed by: INTERNAL MEDICINE

## 2020-03-14 PROCEDURE — 85305 CLOT INHIBIT PROT S TOTAL: CPT | Performed by: PSYCHIATRY & NEUROLOGY

## 2020-03-14 PROCEDURE — 86146 BETA-2 GLYCOPROTEIN ANTIBODY: CPT | Performed by: PSYCHIATRY & NEUROLOGY

## 2020-03-14 PROCEDURE — 99232 SBSQ HOSP IP/OBS MODERATE 35: CPT | Performed by: INTERNAL MEDICINE

## 2020-03-14 PROCEDURE — 81240 F2 GENE: CPT | Performed by: PSYCHIATRY & NEUROLOGY

## 2020-03-14 PROCEDURE — 70551 MRI BRAIN STEM W/O DYE: CPT

## 2020-03-14 PROCEDURE — 85300 ANTITHROMBIN III ACTIVITY: CPT | Performed by: PSYCHIATRY & NEUROLOGY

## 2020-03-14 PROCEDURE — 85306 CLOT INHIBIT PROT S FREE: CPT | Performed by: PSYCHIATRY & NEUROLOGY

## 2020-03-14 PROCEDURE — 99232 SBSQ HOSP IP/OBS MODERATE 35: CPT | Performed by: PSYCHIATRY & NEUROLOGY

## 2020-03-14 PROCEDURE — 81241 F5 GENE: CPT | Performed by: PSYCHIATRY & NEUROLOGY

## 2020-03-14 PROCEDURE — 86147 CARDIOLIPIN ANTIBODY EA IG: CPT | Performed by: PSYCHIATRY & NEUROLOGY

## 2020-03-14 PROCEDURE — 85303 CLOT INHIBIT PROT C ACTIVITY: CPT | Performed by: PSYCHIATRY & NEUROLOGY

## 2020-03-14 PROCEDURE — 85705 THROMBOPLASTIN INHIBITION: CPT | Performed by: PSYCHIATRY & NEUROLOGY

## 2020-03-14 RX ADMIN — DOCUSATE SODIUM 100 MG: 100 CAPSULE, LIQUID FILLED ORAL at 17:13

## 2020-03-14 RX ADMIN — PANTOPRAZOLE SODIUM 40 MG: 40 TABLET, DELAYED RELEASE ORAL at 05:25

## 2020-03-14 RX ADMIN — ATORVASTATIN CALCIUM 40 MG: 40 TABLET, FILM COATED ORAL at 17:13

## 2020-03-14 RX ADMIN — METOPROLOL SUCCINATE 150 MG: 100 TABLET, EXTENDED RELEASE ORAL at 08:33

## 2020-03-14 RX ADMIN — RIVAROXABAN 20 MG: 20 TABLET, FILM COATED ORAL at 08:33

## 2020-03-14 RX ADMIN — DOCUSATE SODIUM 100 MG: 100 CAPSULE, LIQUID FILLED ORAL at 08:33

## 2020-03-14 RX ADMIN — AMLODIPINE BESYLATE 5 MG: 5 TABLET ORAL at 08:33

## 2020-03-14 RX ADMIN — MELATONIN 1000 UNITS: at 08:33

## 2020-03-14 NOTE — PLAN OF CARE
Problem: Potential for Falls  Goal: Patient will remain free of falls  Description  INTERVENTIONS:  - Assess patient frequently for physical needs  -  Identify cognitive and physical deficits and behaviors that affect risk of falls    -  [place] fall precautions as indicated by assessment   - Educate patient/family on patient safety including physical limitations  - Instruct patient to call for assistance with activity based on assessment  - Modify environment to reduce risk of injury  - Consider OT/PT consult to assist with strengthening/mobility  Outcome: Progressing     Problem: PAIN - ADULT  Goal: Verbalizes/displays adequate comfort level or baseline comfort level  Description  Interventions:  - Encourage patient to monitor pain and request assistance  - Assess pain using appropriate pain scale  - Administer analgesics based on type and severity of pain and evaluate response  - Implement non-pharmacological measures as appropriate and evaluate response  - Notify physician/advanced practitioner if interventions unsuccessful or patient reports new pain   Outcome: Progressing     Problem: SAFETY ADULT  Goal: Maintain or return to baseline ADL function  Description  INTERVENTIONS:  -  Assess patient's ability to carry out ADLs; assess patient's baseline for ADL function and identify physical deficits which impact ability to perform ADLs (bathing, care of mouth/teeth, toileting, grooming, dressing, etc )  - Assess/evaluate cause of self-care deficits   - Assess range of motion  - Assess patient's mobility; develop plan if impaired  - Assess patient's need for assistive devices and provide as appropriate  - Encourage maximum independence but intervene and supervise when necessary  - Involve family in performance of ADLs  - Assess for home care needs following discharge   - Consider OT consult to assist with ADL evaluation and planning for discharge  - Provide patient education as appropriate  Outcome: Progressing  Goal: Maintain or return mobility status to optimal level  Description  INTERVENTIONS:  - Assess patient's baseline mobility status (ambulation, transfers, stairs, etc )    - Identify cognitive and physical deficits and behaviors that affect mobility  - Identify mobility aids required to assist with transfers and/or ambulation (gait belt, sit-to-stand, lift, walker, cane, etc )  - [place] fall precautions as indicated by assessment  - Record patient progress and toleration of activity level on Mobility SBAR; progress patient to next Phase/Stage  - Instruct patient to call for assistance with activity based on assessment  - Consider rehabilitation consult to assist with strengthening/weightbearing, etc   Outcome: Progressing     Problem: DISCHARGE PLANNING  Goal: Discharge to home or other facility with appropriate resources  Description  INTERVENTIONS:  - Identify barriers to discharge w/patient and caregiver  - Arrange for needed discharge resources and transportation as appropriate  - Identify discharge learning needs (meds, wound care, etc )  - Refer to Case Management Department for coordinating discharge planning if the patient needs post-hospital services based on physician/advanced practitioner order or complex needs related to functional status, cognitive ability, or social support system   Outcome: Progressing     Problem: Knowledge Deficit  Goal: Patient/family/caregiver demonstrates understanding of disease process, treatment plan, medications, and discharge instructions  Description  Complete learning assessment and assess knowledge base    Interventions:  - Provide teaching at level of understanding  - Provide teaching via preferred learning methods  Outcome: Progressing     Problem: NEUROSENSORY - ADULT  Goal: Achieves stable or improved neurological status  Description  INTERVENTIONS  - Monitor and report changes in neurological status  - Monitor vital signs such as temperature, blood pressure, glucose, and any other labs ordered   - Initiate measures to prevent increased intracranial pressure  - Monitor for seizure activity and implement precautions if appropriate      Outcome: Progressing  Goal: Achieves maximal functionality and self care  Description  INTERVENTIONS  - Monitor swallowing and airway patency with patient fatigue and changes in neurological status  - Encourage and assist patient to increase activity and self care     - Encourage visually impaired, hearing impaired and aphasic patients to use assistive/communication devices  Outcome: Progressing     Problem: MUSCULOSKELETAL - ADULT  Goal: Maintain or return mobility to safest level of function  Description  INTERVENTIONS:  - Assess patient's ability to carry out ADLs; assess patient's baseline for ADL function and identify physical deficits which impact ability to perform ADLs (bathing, care of mouth/teeth, toileting, grooming, dressing, etc )  - Assess/evaluate cause of self-care deficits   - Assess range of motion  - Assess patient's mobility  - Assess patient's need for assistive devices and provide as appropriate  - Encourage maximum independence but intervene and supervise when necessary  - Involve family in performance of ADLs  - Assess for home care needs following discharge   - Consider OT consult to assist with ADL evaluation and planning for discharge  - Provide patient education as appropriate  Outcome: Progressing     Problem: Prexisting or High Potential for Compromised Skin Integrity  Goal: Skin integrity is maintained or improved  Description  INTERVENTIONS:  - Identify patients at risk for skin breakdown  - Assess and monitor skin integrity  - Assess and monitor nutrition and hydration status  - Monitor labs   - Assess for incontinence   - Turn and reposition patient  - Assist with mobility/ambulation  - Relieve pressure over bony prominences  - Avoid friction and shearing  - Provide appropriate hygiene as needed including keeping skin clean and dry  - Evaluate need for skin moisturizer/barrier cream  - Collaborate with interdisciplinary team   - Patient/family teaching  - Consider wound care consult   Outcome: Progressing     Problem: Nutrition/Hydration-ADULT  Goal: Nutrient/Hydration intake appropriate for improving, restoring or maintaining nutritional needs  Description  Monitor and assess patient's nutrition/hydration status for malnutrition  Collaborate with interdisciplinary team and initiate plan and interventions as ordered  Monitor patient's weight and dietary intake as ordered or per policy  Utilize nutrition screening tool and intervene as necessary  Determine patient's food preferences and provide high-protein, high-caloric foods as appropriate       INTERVENTIONS:  - Monitor oral intake, urinary output, labs, and treatment plans  - Assess nutrition and hydration status and recommend course of action  - Evaluate amount of meals eaten  - Assist patient with eating if necessary   - Allow adequate time for meals  - Recommend/ encourage appropriate diets, oral nutritional supplements, and vitamin/mineral supplements  - Order, calculate, and assess calorie counts as needed  - Assess need for intravenous fluids  - Provide specific nutrition/hydration education as appropriate  - Include patient/family/caregiver in decisions related to nutrition   Outcome: Progressing

## 2020-03-14 NOTE — PROGRESS NOTES
Progress Note - Neurology   Leonardo Fung 61 y o  male MRN: [id number]  Unit/Bed#: [facility] 706-01 Encounter: [id number]    Assessment:  L frontal acute infarction, likely L CHARISSE territory, embolic appearing potentially due to unconfirmed paroxysmal Afib  Although he did have a documented post-procedural Afib in 2014, this has not been identified since although he did not have loop placement  Telemetry here unremarkable thus far  With two separate strokes within months of each other, need to consider other possible etiologies as there is no clear source  Pt has been on Xarelto, however he does admit to occasionally missing doses although has been compliant in the days prior to this event - potential Xarelto failure  Plan:  -continue neurochecks, notify with changes  -MRI reviewed - embolic infarcts in likely L CHARISSE territory, mostly paramedian L frontal  -CTA reviewed - intracranial atherosclerotic disease, primarily in R MCA and R PCA, however no other significant abnormality  -TTE with EF 60%, unable to visualize aortic valve or L atrium  -discontinued rivaroxaban, initiated apixaban to start tomorrow AM - spoke extensively about importance of compliance and pt agreed  -continue Atorvastatin  -A1c 5 5, LDL 76  -will plan on NGUYỄN Monday  -thrombosis panel  -maintain on telemetry; if above work-up unremarkable would consider loop placement  -PT/OT/SLP      Subjective:   MRI completed today confirms new ischemic infarct, L frontal  Pt states that weakness may be slightly improved but remains persistent  Admits to missing occasional doses of Xarelto but states that he had been taking it in the days prior to this admission  ROS:  Weakness, otherwise negative per review of all systems  Vitals: Blood pressure 137/85, pulse 74, temperature 98 8 °F (37 1 °C), resp  rate 12, height 5' 8" (1 727 m), weight 113 kg (248 lb 7 3 oz), SpO2 97 %  ,Body mass index is 37 78 kg/m²      Physical Exam: General appearance: alert and oriented, in no acute distress  Head: Normocephalic, without obvious abnormality, atraumatic  Eyes: conjunctivae/corneas clear  PERRL, EOM's intact  Fundi benign  Neck: no adenopathy, no carotid bruit, no JVD and supple, symmetrical, trachea midline  Lungs: clear to auscultation bilaterally  Heart: regular rate and rhythm, S1, S2 normal, no murmur, click, rub or gallop  Abdomen: soft, non-tender; bowel sounds normal; no masses,  no organomegaly  Extremities: extremities normal, warm and well-perfused; no cyanosis, clubbing, or edema     Neurologic exam:  Awake and alert with appropriate mental status and language function  No visual, EOM, or facial deficit  Motor testing reveals 5/5 strength throughout L; 3+/5 proximally in RUE, 4+/5 triceps, 5/5 biceps and ; 3/5 proximally in RLE with 4/5 knee flexion and plantarflexion, 0/5 dorsiflexion or toe extension  Sensation is intact to light touch in the bilateral upper and lower extremities  Dysmetria in RUE consistent with proximal weakness      Lab, Imaging and other studies:   CBC:   Results from last 7 days   Lab Units 03/14/20  0529 03/13/20  0558 03/12/20  2332   WBC Thousand/uL 11 00* 8 10 8 32   RBC Million/uL 4 97 4 89 5 08   HEMOGLOBIN g/dL 14 8 14 9 15 3   HEMATOCRIT % 44 4 43 2 44 4   MCV fL 89 88 87   PLATELETS Thousands/uL 178 174 179   , BMP/CMP:   Results from last 7 days   Lab Units 03/14/20  0529 03/13/20  0558 03/12/20  2332   SODIUM mmol/L 141 140 138   POTASSIUM mmol/L 3 9 3 4* 3 5   CHLORIDE mmol/L 108 106 106   CO2 mmol/L 27 29 29   BUN mg/dL 11 10 13   CREATININE mg/dL 1 01 0 97 1 07   CALCIUM mg/dL 8 8 8 7 8 7   AST U/L 19  --   --    ALT U/L 33  --   --    ALK PHOS U/L 125*  --   --    EGFR ml/min/1 73sq m 80 84 75   , HgBA1C:   Results from last 7 days   Lab Units 03/13/20  0558   HEMOGLOBIN A1C % 5 5   , Coagulation:   Results from last 7 days   Lab Units 03/14/20  0124   INR  1 22*   , Lipid Profile:   Results from last 7 days   Lab Units 03/13/20  0558   HDL mg/dL 37*   LDL CALC mg/dL 76   TRIGLYCERIDES mg/dL 55     VTE Prophylaxis: Sequential compression device (Venodyne)

## 2020-03-14 NOTE — ASSESSMENT & PLAN NOTE
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   Moderate narrowing of the anterior M2 branch on the right at the origin  Moderate narrowing of the distal P1 segment on the right and mild narrowing of the proximal P1 segment on the left  No occlusion or severe stenosis of the major arteries of the head and neck seen  No stenosis within either internal carotid artery    Patent bilateral vertebral arteries "  Neurology on-call immediately responded, due to current Xarelto use patient took dose at 5 p m  he is t not  candidate for tPA  Due to persistent neurologic deficit he will be admitted on stroke pathway  Neurochecks according to protocol  Fall and aspiration precautions  Official speech and swallow evaluation: now on cardiac diet  Echocardiogram: showed 60%, NGUYỄN on Monday  MRI: Left CHARISSE embolic   Telemetry renewed  Obtain fasting lipid panel hemoglobin A1c  Obtain inflammatory markers: pending   Continue statin Xarelto, switched to apixaban  Neurology recommendations appreciated

## 2020-03-14 NOTE — PROGRESS NOTES
Progress Note - Yue Rojas 1960, 61 y o  male MRN: [id number]    Unit/Bed#: [facility] 706-01 Encounter: [id number]    Primary Care Provider: Elanie De La Garza MD   Date and time admitted to hospital: 3/12/2020 11:24 PM        History of CVA (cerebrovascular accident)  Assessment & Plan  Deemed to be embolic event with negative extensive workup in the past  On Xarelto, statin  Management as above    Class 2 obesity due to excess calories in adult  Assessment & Plan  Low-calorie diet    Hyperlipidemia  Assessment & Plan  On statin    PAF (paroxysmal atrial fibrillation) (HCC)  Assessment & Plan  On beta-blocker and Xarelto    Essential hypertension  Assessment & Plan  Continue amlodipine and beta-blocker with holding parameters  Admit to telemetry    * 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 [facility])  Assessment & Plan  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   Moderate narrowing of the anterior M2 branch on the right at the origin  Moderate narrowing of the distal P1 segment on the right and mild narrowing of the proximal P1 segment on the left  No occlusion or severe stenosis of the major arteries of the head and neck seen  No stenosis within either internal carotid artery    Patent bilateral vertebral arteries "  Neurology on-call immediately responded, due to current Xarelto use patient took dose at 5 p m  he is t not  candidate for tPA  Due to persistent neurologic deficit he will be admitted on stroke pathway  Neurochecks according to protocol  Fall and aspiration precautions  Official speech and swallow evaluation: now on cardiac diet  Echocardiogram: showed 60%, NGUYỄN on Monday  MRI: Left CHARISSE embolic   Telemetry renewed  Obtain fasting lipid panel hemoglobin A1c  Obtain inflammatory markers: pending   Continue statin Xarelto, switched to apixaban  Neurology recommendations appreciated           VTE Pharmacologic Prophylaxis:   Pharmacologic: Pharmacologic VTE Prophylaxis contraindicated due to currently off xeralto, will start apixaban tomorrow  Mechanical VTE Prophylaxis in Place: Yes    Patient Centered Rounds: I have performed bedside rounds with nursing staff today  Discussions with Specialists or Other Care Team Provider: neurology    Education and Discussions with Family / Patient: patient     Time Spent for Care: 45 minutes  More than 50% of total time spent on counseling and coordination of care as described above  Current Length of Stay: 1 day(s)    Current Patient Status: Inpatient   Certification Statement: The patient will continue to require additional inpatient hospital stay due to [address] [place] stroke work up    Discharge Plan: need NGUYỄN monday    Code Status: Level 1 - Full Code      Subjective:   Patient is doing well today  No acute issues  Went for MRI today  Objective:     Vitals:   Temp (24hrs), Av 6 °F (37 °C), Min:98 4 °F (36 9 °C), Max:98 8 °F (37 1 °C)    Temp:  [98 4 °F (36 9 °C)-98 8 °F (37 1 °C)] 98 8 °F (37 1 °C)  HR:  [65-74] 74  Resp:  [12-18] 12  BP: (120-142)/(74-85) 137/85  SpO2:  [96 %-98 %] 97 %  Body mass index is 37 78 kg/m²  Input and Output Summary (last 24 hours): Intake/Output Summary (Last 24 hours) at 3/14/2020 1428  Last data filed at 3/14/2020 1200  Gross per 24 hour   Intake 1163 75 ml   Output 1075 ml   Net 88 75 ml       Physical Exam:     Physical Exam   Constitutional: He is oriented to person, place, and time  He appears well-developed and well-nourished  No distress  HENT:   Head: Normocephalic and atraumatic  Right Ear: External ear normal    Left Ear: External ear normal    Nose: Nose normal    Mouth/Throat: Oropharynx is clear and moist  No oropharyngeal exudate  Eyes: Pupils are equal, round, and reactive to light  Conjunctivae and EOM are normal  Right eye exhibits no discharge  Left eye exhibits no discharge  No scleral icterus  Neck: Normal range of motion  Neck supple  No JVD present  No tracheal deviation present  No thyromegaly present  Cardiovascular: Normal rate, regular rhythm, normal heart sounds and intact distal pulses  Exam reveals no gallop and no friction rub  No murmur heard  Pulmonary/Chest: Effort normal  No stridor  No respiratory distress  He has wheezes  He has no rales  He exhibits no tenderness  Abdominal: Soft  Bowel sounds are normal  He exhibits no distension and no mass  There is no tenderness  There is no guarding  Musculoskeletal: Normal range of motion  He exhibits no edema  Lymphadenopathy:     He has no cervical adenopathy  Neurological: He is alert and oriented to person, place, and time  He displays normal reflexes  No cranial nerve deficit or sensory deficit  He exhibits normal muscle tone  Coordination normal    Skin: Skin is warm and dry  He is not diaphoretic  No erythema  No pallor  Psychiatric: He has a normal mood and affect  His behavior is normal  Thought content normal    Nursing note and vitals reviewed  Additional Data:     Labs:    Results from last 7 days   Lab Units 03/14/20  0529   WBC Thousand/uL 11 00*   HEMOGLOBIN g/dL 14 8   HEMATOCRIT % 44 4   PLATELETS Thousands/uL 178   NEUTROS PCT % 73   LYMPHS PCT % 16   MONOS PCT % 6   EOS PCT % 4     Results from last 7 days   Lab Units 03/14/20  0529   POTASSIUM mmol/L 3 9   CHLORIDE mmol/L 108   CO2 mmol/L 27   BUN mg/dL 11   CREATININE mg/dL 1 01   CALCIUM mg/dL 8 8   ALK PHOS U/L 125*   ALT U/L 33   AST U/L 19     Results from last 7 days   Lab Units 03/14/20  0124   INR  1 22*       * I Have Reviewed All Lab Data Listed Above  * Additional Pertinent Lab Tests Reviewed:  [facility]    Imaging:    Imaging Reports Reviewed Today Include:    Imaging Personally Reviewed by Myself Includes:       Recent Cultures (last 7 days):           Last 24 Hours Medication List:     Current Facility-Administered Medications:  amLODIPine 5 mg Oral Daily Camille Looney MD   [START ON 3/15/2020] apixaban 5 mg Oral BID Limmie Anupama,    atorvastatin 40 mg Oral Daily With Rae Will MD   cholecalciferol 1,000 Units Oral Daily Camille Looney MD   docusate sodium 100 mg Oral BID Camille Looney MD   metoprolol succinate 150 mg Oral Daily Camille Looney MD   ondansetron 4 mg Intravenous Q6H PRN Camille Looney MD   pantoprazole 40 mg Oral Early Morning Camille Looney MD   senna 1 tablet Oral Daily Camille Looney MD        Today, Patient Was Seen By: Taz Chacon MD    ** Please Note: Dictation voice to text software may have been used in the creation of this document   **

## 2020-03-15 LAB
ALBUMIN SERPL BCP-MCNC: 3.8 G/DL (ref 3.5–5)
ALP SERPL-CCNC: 136 U/L (ref 46–116)
ALT SERPL W P-5'-P-CCNC: 35 U/L (ref 12–78)
ANION GAP SERPL CALCULATED.3IONS-SCNC: 8 MMOL/L (ref 4–13)
AST SERPL W P-5'-P-CCNC: 18 U/L (ref 5–45)
BILIRUB SERPL-MCNC: 1.73 MG/DL (ref 0.2–1)
BUN SERPL-MCNC: 14 MG/DL (ref 5–25)
CALCIUM SERPL-MCNC: 8.9 MG/DL (ref 8.3–10.1)
CHLORIDE SERPL-SCNC: 110 MMOL/L (ref 100–108)
CO2 SERPL-SCNC: 22 MMOL/L (ref 21–32)
CREAT SERPL-MCNC: 1.06 MG/DL (ref 0.6–1.3)
DEPRECATED AT III PPP: 111 % OF NORMAL (ref 92–136)
GFR SERPL CREATININE-BSD FRML MDRD: 76 ML/MIN/1.73SQ M
GLUCOSE SERPL-MCNC: 121 MG/DL (ref 65–140)
POTASSIUM SERPL-SCNC: 3.9 MMOL/L (ref 3.5–5.3)
PROT SERPL-MCNC: 7.6 G/DL (ref 6.4–8.2)
SODIUM SERPL-SCNC: 140 MMOL/L (ref 136–145)

## 2020-03-15 PROCEDURE — 99233 SBSQ HOSP IP/OBS HIGH 50: CPT | Performed by: INTERNAL MEDICINE

## 2020-03-15 PROCEDURE — 80053 COMPREHEN METABOLIC PANEL: CPT | Performed by: INTERNAL MEDICINE

## 2020-03-15 RX ADMIN — AMLODIPINE BESYLATE 5 MG: 5 TABLET ORAL at 08:20

## 2020-03-15 RX ADMIN — DOCUSATE SODIUM 100 MG: 100 CAPSULE, LIQUID FILLED ORAL at 08:20

## 2020-03-15 RX ADMIN — APIXABAN 5 MG: 5 TABLET, FILM COATED ORAL at 08:20

## 2020-03-15 RX ADMIN — ATORVASTATIN CALCIUM 40 MG: 40 TABLET, FILM COATED ORAL at 17:15

## 2020-03-15 RX ADMIN — MELATONIN 1000 UNITS: at 08:20

## 2020-03-15 RX ADMIN — METOPROLOL SUCCINATE 150 MG: 100 TABLET, EXTENDED RELEASE ORAL at 08:19

## 2020-03-15 RX ADMIN — APIXABAN 5 MG: 5 TABLET, FILM COATED ORAL at 17:15

## 2020-03-15 RX ADMIN — DOCUSATE SODIUM 100 MG: 100 CAPSULE, LIQUID FILLED ORAL at 17:15

## 2020-03-15 RX ADMIN — ONDANSETRON 4 MG: 2 INJECTION INTRAMUSCULAR; INTRAVENOUS at 08:19

## 2020-03-15 RX ADMIN — PANTOPRAZOLE SODIUM 40 MG: 40 TABLET, DELAYED RELEASE ORAL at 05:50

## 2020-03-15 NOTE — PLAN OF CARE
Problem: Potential for Falls  Goal: Patient will remain free of falls  Description  INTERVENTIONS:  - Assess patient frequently for physical needs  -  Identify cognitive and physical deficits and behaviors that affect risk of falls    -  [place] fall precautions as indicated by assessment   - Educate patient/family on patient safety including physical limitations  - Instruct patient to call for assistance with activity based on assessment  - Modify environment to reduce risk of injury  - Consider OT/PT consult to assist with strengthening/mobility  Outcome: Progressing     Problem: PAIN - ADULT  Goal: Verbalizes/displays adequate comfort level or baseline comfort level  Description  Interventions:  - Encourage patient to monitor pain and request assistance  - Assess pain using appropriate pain scale  - Administer analgesics based on type and severity of pain and evaluate response  - Implement non-pharmacological measures as appropriate and evaluate response  - Notify physician/advanced practitioner if interventions unsuccessful or patient reports new pain   Outcome: Progressing     Problem: SAFETY ADULT  Goal: Maintain or return to baseline ADL function  Description  INTERVENTIONS:  -  Assess patient's ability to carry out ADLs; assess patient's baseline for ADL function and identify physical deficits which impact ability to perform ADLs (bathing, care of mouth/teeth, toileting, grooming, dressing, etc )  - Assess/evaluate cause of self-care deficits   - Assess range of motion  - Assess patient's mobility; develop plan if impaired  - Assess patient's need for assistive devices and provide as appropriate  - Encourage maximum independence but intervene and supervise when necessary  - Involve family in performance of ADLs  - Assess for home care needs following discharge   - Consider OT consult to assist with ADL evaluation and planning for discharge  - Provide patient education as appropriate  Outcome: Progressing  Goal: Maintain or return mobility status to optimal level  Description  INTERVENTIONS:  - Assess patient's baseline mobility status (ambulation, transfers, stairs, etc )    - Identify cognitive and physical deficits and behaviors that affect mobility  - Identify mobility aids required to assist with transfers and/or ambulation (gait belt, sit-to-stand, lift, walker, cane, etc )  - [place] fall precautions as indicated by assessment  - Record patient progress and toleration of activity level on Mobility SBAR; progress patient to next Phase/Stage  - Instruct patient to call for assistance with activity based on assessment  - Consider rehabilitation consult to assist with strengthening/weightbearing, etc   Outcome: Progressing     Problem: DISCHARGE PLANNING  Goal: Discharge to home or other facility with appropriate resources  Description  INTERVENTIONS:  - Identify barriers to discharge w/patient and caregiver  - Arrange for needed discharge resources and transportation as appropriate  - Identify discharge learning needs (meds, wound care, etc )  - Refer to Case Management Department for coordinating discharge planning if the patient needs post-hospital services based on physician/advanced practitioner order or complex needs related to functional status, cognitive ability, or social support system   Outcome: Progressing     Problem: Knowledge Deficit  Goal: Patient/family/caregiver demonstrates understanding of disease process, treatment plan, medications, and discharge instructions  Description  Complete learning assessment and assess knowledge base    Interventions:  - Provide teaching at level of understanding  - Provide teaching via preferred learning methods  Outcome: Progressing     Problem: NEUROSENSORY - ADULT  Goal: Achieves stable or improved neurological status  Description  INTERVENTIONS  - Monitor and report changes in neurological status  - Monitor vital signs such as temperature, blood pressure, glucose, and any other labs ordered   - Initiate measures to prevent increased intracranial pressure  - Monitor for seizure activity and implement precautions if appropriate      Outcome: Progressing  Goal: Achieves maximal functionality and self care  Description  INTERVENTIONS  - Monitor swallowing and airway patency with patient fatigue and changes in neurological status  - Encourage and assist patient to increase activity and self care     - Encourage visually impaired, hearing impaired and aphasic patients to use assistive/communication devices  Outcome: Progressing     Problem: MUSCULOSKELETAL - ADULT  Goal: Maintain or return mobility to safest level of function  Description  INTERVENTIONS:  - Assess patient's ability to carry out ADLs; assess patient's baseline for ADL function and identify physical deficits which impact ability to perform ADLs (bathing, care of mouth/teeth, toileting, grooming, dressing, etc )  - Assess/evaluate cause of self-care deficits   - Assess range of motion  - Assess patient's mobility  - Assess patient's need for assistive devices and provide as appropriate  - Encourage maximum independence but intervene and supervise when necessary  - Involve family in performance of ADLs  - Assess for home care needs following discharge   - Consider OT consult to assist with ADL evaluation and planning for discharge  - Provide patient education as appropriate  Outcome: Progressing     Problem: Prexisting or High Potential for Compromised Skin Integrity  Goal: Skin integrity is maintained or improved  Description  INTERVENTIONS:  - Identify patients at risk for skin breakdown  - Assess and monitor skin integrity  - Assess and monitor nutrition and hydration status  - Monitor labs   - Assess for incontinence   - Turn and reposition patient  - Assist with mobility/ambulation  - Relieve pressure over bony prominences  - Avoid friction and shearing  - Provide appropriate hygiene as needed including keeping skin clean and dry  - Evaluate need for skin moisturizer/barrier cream  - Collaborate with interdisciplinary team   - Patient/family teaching  - Consider wound care consult   Outcome: Progressing     Problem: Nutrition/Hydration-ADULT  Goal: Nutrient/Hydration intake appropriate for improving, restoring or maintaining nutritional needs  Description  Monitor and assess patient's nutrition/hydration status for malnutrition  Collaborate with interdisciplinary team and initiate plan and interventions as ordered  Monitor patient's weight and dietary intake as ordered or per policy  Utilize nutrition screening tool and intervene as necessary  Determine patient's food preferences and provide high-protein, high-caloric foods as appropriate       INTERVENTIONS:  - Monitor oral intake, urinary output, labs, and treatment plans  - Assess nutrition and hydration status and recommend course of action  - Evaluate amount of meals eaten  - Assist patient with eating if necessary   - Allow adequate time for meals  - Recommend/ encourage appropriate diets, oral nutritional supplements, and vitamin/mineral supplements  - Order, calculate, and assess calorie counts as needed  - Assess need for intravenous fluids  - Provide specific nutrition/hydration education as appropriate  - Include patient/family/caregiver in decisions related to nutrition   Outcome: Progressing

## 2020-03-15 NOTE — PROGRESS NOTES
Progress Note - Lidia Shore 1960, 61 y o  male MRN: [id number]    [facility] Encounter: [id number]    Primary Care Provider: Angela Banegas MD   Date and time admitted to hospital: 3/12/2020 11:24 PM        History of CVA (cerebrovascular accident)  Assessment & Plan  Deemed to be embolic event with negative extensive workup in the past  On Xarelto, statin       Hyperlipidemia  Assessment & Plan  On statin    PAF (paroxysmal atrial fibrillation) (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 [facility])  Assessment & Plan  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   Moderate narrowing of the anterior M2 branch on the right at the origin  Moderate narrowing of the distal P1 segment on the right and mild narrowing of the proximal P1 segment on the left  No occlusion or severe stenosis of the major arteries of the head and neck seen  No stenosis within either internal carotid artery    Patent bilateral vertebral arteries "  Neurology on-call immediately responded, due to current Xarelto use patient took dose at 5 p m  he is t not  candidate for tPA  Due to persistent neurologic deficit he will be admitted on stroke pathway  Neurochecks according to protocol  Fall and aspiration precautions  Official speech and swallow evaluation: now on cardiac diet  Echocardiogram: showed 60%, NGUYỄN on Monday, npo past midnight sunday  MRI: Left CHARISSE embolic   Telemetry renewed  Obtain fasting lipid panel hemoglobin A1c  Obtain inflammatory markers: pending   Continue statin Xarelto, switched to apixaban  Neurology recommendations appreciated           VTE Pharmacologic Prophylaxis:   Pharmacologic: Pharmacologic VTE Prophylaxis contraindicated due to currently off xeralto, will start apixaban tomorrow  Mechanical VTE Prophylaxis in Place: Yes    Patient Centered Rounds: I have performed bedside rounds with nursing staff today  Discussions with Specialists or Other Care Team Provider: neurology    Education and Discussions with Family / Patient: patient     Time Spent for Care: 45 minutes  More than 50% of total time spent on counseling and coordination of care as described above  Current Length of Stay: 2 day(s)    Current Patient Status: Inpatient   Certification Statement: The patient will continue to require additional inpatient hospital stay due to [address] stroke work up    Discharge Plan: need NGUYỄN monday    Code Status: Level 1 - Full Code      Subjective:   Patient is doing well today  No acute issues  NGUYỄN tomorrow    Objective:     Vitals:   Temp (24hrs), Av 4 °F (36 9 °C), Min:98 2 °F (36 8 °C), Max:98 8 °F (37 1 °C)    Temp:  [98 2 °F (36 8 °C)-98 8 °F (37 1 °C)] 98 5 °F (36 9 °C)  HR:  [61-74] 71  Resp:  [18-19] 18  BP: (131-145)/(84-91) 145/91  SpO2:  [95 %-97 %] 97 %  Body mass index is 37 78 kg/m²  Input and Output Summary (last 24 hours): Intake/Output Summary (Last 24 hours) at 3/15/2020 1006  Last data filed at 3/15/2020 0900  Gross per 24 hour   Intake 660 ml   Output 1725 ml   Net -1065 ml       Physical Exam:     Physical Exam   Constitutional: He is oriented to person, place, and time  He appears well-developed and well-nourished  No distress  HENT:   Head: Normocephalic and atraumatic     Right Ear: External ear normal    Left Ear: External ear normal    Nose: Nose normal    Mouth/Throat: Oropharynx is clear and moist  No oropharyngeal exudate  Eyes: Pupils are equal, round, and reactive to light  Conjunctivae and EOM are normal  Right eye exhibits no discharge  Left eye exhibits no discharge  No scleral icterus  Neck: Normal range of motion  Neck supple  No JVD present  No tracheal deviation present  No thyromegaly present  Cardiovascular: Normal rate, regular rhythm, normal heart sounds and intact distal pulses  Exam reveals no gallop and no friction rub  No murmur heard  Pulmonary/Chest: Effort normal  No stridor  No respiratory distress  He has wheezes  He has no rales  He exhibits no tenderness  Abdominal: Soft  Bowel sounds are normal  He exhibits no distension and no mass  There is no tenderness  There is no guarding  Musculoskeletal: Normal range of motion  He exhibits no edema  Lymphadenopathy:     He has no cervical adenopathy  Neurological: He is alert and oriented to person, place, and time  He displays normal reflexes  No cranial nerve deficit or sensory deficit  He exhibits normal muscle tone  Coordination normal    Skin: Skin is warm and dry  He is not diaphoretic  No erythema  No pallor  Psychiatric: He has a normal mood and affect  His behavior is normal  Thought content normal    Nursing note and vitals reviewed  Additional Data:     Labs:    Results from last 7 days   Lab Units 03/14/20  0529   WBC Thousand/uL 11 00*   HEMOGLOBIN g/dL 14 8   HEMATOCRIT % 44 4   PLATELETS Thousands/uL 178   NEUTROS PCT % 73   LYMPHS PCT % 16   MONOS PCT % 6   EOS PCT % 4     Results from last 7 days   Lab Units 03/15/20  0727   POTASSIUM mmol/L 3 9   CHLORIDE mmol/L 110*   CO2 mmol/L 22   BUN mg/dL 14   CREATININE mg/dL 1 06   CALCIUM mg/dL 8 9   ALK PHOS U/L 136*   ALT U/L 35   AST U/L 18     Results from last 7 days   Lab Units 03/14/20  0124   INR  1 22*       * I Have Reviewed All Lab Data Listed Above  * Additional Pertinent Lab Tests Reviewed:  [facility]    Imaging:    Imaging Reports Reviewed Today Include:    Imaging Personally Reviewed by Myself Includes:       Recent Cultures (last 7 days):           Last 24 Hours Medication List:     Current Facility-Administered Medications:  amLODIPine 5 mg Oral Daily eKlsey Abarca MD   apixaban 5 mg Oral BID Sarah Elliott DO   atorvastatin 40 mg Oral Daily With Antonino Baldwin MD   cholecalciferol 1,000 Units Oral Daily Kelsey Abarca MD   docusate sodium 100 mg Oral BID Kelsey Abarca MD   metoprolol succinate 150 mg Oral Daily Kelsey Abarca MD   ondansetron 4 mg Intravenous Q6H PRN Kelsey Abarca MD   pantoprazole 40 mg Oral Early Morning Kelsey Abarca MD   senna 1 tablet Oral Daily Kesley Abarca MD        Today, Patient Was Seen By: Sarah Ballesteros MD    ** Please Note: Dictation voice to text software may have been used in the creation of this document   **

## 2020-03-15 NOTE — ASSESSMENT & PLAN NOTE
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   Moderate narrowing of the anterior M2 branch on the right at the origin  Moderate narrowing of the distal P1 segment on the right and mild narrowing of the proximal P1 segment on the left  No occlusion or severe stenosis of the major arteries of the head and neck seen  No stenosis within either internal carotid artery    Patent bilateral vertebral arteries "  Neurology on-call immediately responded, due to current Xarelto use patient took dose at 5 p m  he is t not  candidate for tPA  Due to persistent neurologic deficit he was admitted on stroke pathway  Neurochecks according to protocol  Fall and aspiration precautions  Official speech and swallow evaluation: now on cardiac diet  Echocardiogram: showed 60%, NGUYỄN on Monday, npo past midnight sunday  MRI: Left CHARISSE embolic   Telemetry renewed  Obtain fasting lipid panel hemoglobin A1c  Obtain inflammatory markers: pending   Continue statin Xarelto, switched to apixaban  Neurology recommendations appreciated

## 2020-03-16 LAB
ANION GAP SERPL CALCULATED.3IONS-SCNC: 6 MMOL/L (ref 4–13)
BASOPHILS # BLD AUTO: 0.04 THOUSANDS/ΜL (ref 0–0.1)
BASOPHILS NFR BLD AUTO: 0 % (ref 0–1)
BUN SERPL-MCNC: 14 MG/DL (ref 5–25)
CALCIUM SERPL-MCNC: 8.8 MG/DL (ref 8.3–10.1)
CARDIOLIPIN IGA SER IA-ACNC: 25 APL U/ML (ref 0–11)
CARDIOLIPIN IGG SER IA-ACNC: 12 GPL U/ML (ref 0–14)
CARDIOLIPIN IGM SER IA-ACNC: 15 MPL U/ML (ref 0–12)
CHLORIDE SERPL-SCNC: 108 MMOL/L (ref 100–108)
CO2 SERPL-SCNC: 29 MMOL/L (ref 21–32)
CREAT SERPL-MCNC: 1.08 MG/DL (ref 0.6–1.3)
EOSINOPHIL # BLD AUTO: 0.38 THOUSAND/ΜL (ref 0–0.61)
EOSINOPHIL NFR BLD AUTO: 3 % (ref 0–6)
ERYTHROCYTE [DISTWIDTH] IN BLOOD BY AUTOMATED COUNT: 13.2 % (ref 11.6–15.1)
GFR SERPL CREATININE-BSD FRML MDRD: 74 ML/MIN/1.73SQ M
GLUCOSE SERPL-MCNC: 107 MG/DL (ref 65–140)
HCT VFR BLD AUTO: 45.1 % (ref 36.5–49.3)
HGB BLD-MCNC: 15.1 G/DL (ref 12–17)
IMM GRANULOCYTES # BLD AUTO: 0.04 THOUSAND/UL (ref 0–0.2)
IMM GRANULOCYTES NFR BLD AUTO: 0 % (ref 0–2)
LYMPHOCYTES # BLD AUTO: 2.01 THOUSANDS/ΜL (ref 0.6–4.47)
LYMPHOCYTES NFR BLD AUTO: 18 % (ref 14–44)
MCH RBC QN AUTO: 29.9 PG (ref 26.8–34.3)
MCHC RBC AUTO-ENTMCNC: 33.5 G/DL (ref 31.4–37.4)
MCV RBC AUTO: 89 FL (ref 82–98)
MONOCYTES # BLD AUTO: 0.81 THOUSAND/ΜL (ref 0.17–1.22)
MONOCYTES NFR BLD AUTO: 7 % (ref 4–12)
NEUTROPHILS # BLD AUTO: 8.21 THOUSANDS/ΜL (ref 1.85–7.62)
NEUTS SEG NFR BLD AUTO: 72 % (ref 43–75)
NRBC BLD AUTO-RTO: 0 /100 WBCS
PLATELET # BLD AUTO: 198 THOUSANDS/UL (ref 149–390)
PMV BLD AUTO: 10.3 FL (ref 8.9–12.7)
POTASSIUM SERPL-SCNC: 3.7 MMOL/L (ref 3.5–5.3)
PROT C AG ACT/NOR PPP IA: >150 % OF NORMAL (ref 60–150)
PROT S ACT/NOR PPP: 147 % (ref 57–157)
PROT S ACT/NOR PPP: 163 % (ref 63–140)
PROT S PPP-ACNC: 133 % (ref 60–150)
RBC # BLD AUTO: 5.05 MILLION/UL (ref 3.88–5.62)
SODIUM SERPL-SCNC: 143 MMOL/L (ref 136–145)
WBC # BLD AUTO: 11.49 THOUSAND/UL (ref 4.31–10.16)

## 2020-03-16 PROCEDURE — 80048 BASIC METABOLIC PNL TOTAL CA: CPT | Performed by: INTERNAL MEDICINE

## 2020-03-16 PROCEDURE — 97535 SELF CARE MNGMENT TRAINING: CPT

## 2020-03-16 PROCEDURE — 97116 GAIT TRAINING THERAPY: CPT

## 2020-03-16 PROCEDURE — 99232 SBSQ HOSP IP/OBS MODERATE 35: CPT | Performed by: PSYCHIATRY & NEUROLOGY

## 2020-03-16 PROCEDURE — 97530 THERAPEUTIC ACTIVITIES: CPT

## 2020-03-16 PROCEDURE — 85025 COMPLETE CBC W/AUTO DIFF WBC: CPT | Performed by: INTERNAL MEDICINE

## 2020-03-16 PROCEDURE — 99232 SBSQ HOSP IP/OBS MODERATE 35: CPT | Performed by: INTERNAL MEDICINE

## 2020-03-16 RX ADMIN — PANTOPRAZOLE SODIUM 40 MG: 40 TABLET, DELAYED RELEASE ORAL at 09:47

## 2020-03-16 RX ADMIN — AMLODIPINE BESYLATE 5 MG: 5 TABLET ORAL at 09:35

## 2020-03-16 RX ADMIN — METOPROLOL SUCCINATE 150 MG: 100 TABLET, EXTENDED RELEASE ORAL at 09:35

## 2020-03-16 RX ADMIN — APIXABAN 5 MG: 5 TABLET, FILM COATED ORAL at 17:21

## 2020-03-16 RX ADMIN — ATORVASTATIN CALCIUM 40 MG: 40 TABLET, FILM COATED ORAL at 17:21

## 2020-03-16 RX ADMIN — APIXABAN 5 MG: 5 TABLET, FILM COATED ORAL at 09:35

## 2020-03-16 RX ADMIN — DOCUSATE SODIUM 100 MG: 100 CAPSULE, LIQUID FILLED ORAL at 09:35

## 2020-03-16 RX ADMIN — MELATONIN 1000 UNITS: at 09:35

## 2020-03-16 NOTE — PHYSICAL THERAPY NOTE
Physical Therapy Treatment Note     Patient Name: Aubrey Ludwig    [id number]'[id number] Date: 3/16/2020     Problem List  Principal Problem:    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 (HCC)  Active Problems:    H/O aortic aneurysm repair    Essential hypertension    PAF (paroxysmal atrial fibrillation) (HCC)    Hyperlipidemia    Class 2 obesity due to excess calories in adult    History of CVA (cerebrovascular accident)       Past Medical History  Past Medical History:   Diagnosis Date    Aneurysm of thoracic aorta ([address] )     last assessed 11/20/17    Anxiety     currently on no meds    Cardiac disease     Cholelithiasis     Chronic kidney disease     GERD (gastroesophageal reflux disease)     Headache due to anesthesia     Hyperlipidemia     Hypertension     Irritable bowel syndrome     Kidney stone     Palpitations     PONV (postoperative nausea and vomiting)     Shortness of breath     ? related to acid reflux    Sleep apnea     not sure    Stroke [facility]) 11/2014    TIA (transient ischemic attack)         Past Surgical History  Past Surgical History:   Procedure Laterality Date    AORTA SURGERY      thoracic - aneurysmorrhaphy    APPENDECTOMY      ASCENDING AORTIC ANEURYSM REPAIR      resolved 8/19/15    CHOLECYSTECTOMY      laparoscopic    CYSTOSCOPY      with removal of object- stent removal    KNEE ARTHROSCOPY Right 1996    with medial meniscus repair    LITHOTRIPSY      renal    [place] FRAGMENT KIDNEY STONE/ ESWL Left 5/17/2018    Procedure: LITHROTRIPSY EXTRACORPORAL SHOCKWAVE (ESWL);   Surgeon: Sara Matamoros MD;  Location: [facility]  MAIN [facility];  Service: Urology    THUMB ARTHROSCOPY Right 1976    ligament repair         03/16/20 0916   Pain Assessment   Pain Assessment Tool Pain Assessment not indicated - pt denies pain   Pain Score No Pain   Restrictions/Precautions   Weight Bearing Precautions Per Order No   Other Precautions Cognitive; Chair Alarm; Bed Alarm; Fall Risk;Multiple lines;Telemetry   General   Chart Reviewed Yes   Family/Caregiver Present No   Cognition   Arousal/Participation Alert; Responsive   Attention Attends with cues to redirect   Orientation Level Oriented X4   Memory Decreased recall of precautions;Decreased recall of recent events   Following Commands Follows one step commands without difficulty   Comments Improvement noted in participation  Motivated to participate  Bed Mobility   Additional Comments Not performed  Pt seated OOB upon arrival  Concluded session with patient seated OOB with chair alarm on  Transfers   Sit to Stand 3  Moderate assistance   Additional items Assist x 1; Increased time required;Verbal cues  (Progressed to min A x 1)   Stand to Sit 3  Moderate assistance   Additional items Assist x 1; Increased time required;Verbal cues  (Progressed to min A )   Toilet transfer 3  Moderate assistance   Additional items Assist x 1; Increased time required;Verbal cues; Commode   Additional Comments Required mod A x 1 initially, progressed to min A x 1 with subsequent transfers  Ambulation/Elevation   Gait pattern R Hemiparesis;R Knee Cecilia; Improper Weight shift; Shuffling; Short stride; Ataxia  (Improvement in R hip flexion/R knee flexion; Leans towards R during L swing phase) (decreased R heel strike/toe extension on swing phase)    Gait Assistance 3  Moderate assist  (chair follow)   Additional items Assist x 1;Verbal cues  (VC for step lenght and heel strike)   Assistive Device Rolling walker  (10 feet x 2 with R handrail; 20+10 feet with RW)   Distance 10 feet x 2 feet + 20 feet + 10 feet - required several seated rest break      Balance   Static Sitting Fair -   Dynamic Sitting Poor +   Static Standing Poor +   Dynamic Standing Poor +   Ambulatory Poor   Endurance Deficit   Endurance Deficit Yes   Endurance Deficit Description RLE weakness   Activity Tolerance   Activity Tolerance Patient limited by fatigue   Medical Staff Made Aware JORGE ARGUETA   Nurse Made Aware Yes, HERON Constantino   Exercises   Balance training  Dynamic/standing tolerance for 5 minutes x 2 + 2 minutes x 2, required min A x 1 for static/dynamic balance  Assessment   Prognosis Guarded   Problem List Decreased strength;Decreased endurance;Decreased mobility; Decreased coordination;Decreased cognition; Impaired judgement;Decreased safety awareness   Assessment Patient seen for physical therapy session with a focus on gait training, functional transfers, standing balance, and activity tolerance  Pt received seated OOB and agreeable to PT session  Pt is motivated to participate in therapy  Improvement noted in RLE strength compared to initial evaluation; increased strength noted in R hip flexion, R knee extension, and ankle DF  Limited movement in R toe extension  Improvement in gait quality as patient able to initiate step with RLE and no longer requires physical assistance to move RLE during ambulation  Increased ambulation distances to 10 feet x 2 + 20 feet  Initially, ambulated with R handrail and HHA x 1 but progressed to RW  Mild R knee buckling but overall good control of knee extensors  VC required for step length and heel strike on initial contact  Increased standing tolerance to approx 5 minutes  Pt continues to demonstrate deficits in gait, lucien, RLE weakness, standing tolerance, coordination, and ambulatory/standing balance  Recommend STR upon discharge once medically stable  Barriers to Discharge Decreased caregiver support; Inaccessible home environment   Goals   Patient Goals To walk   STG Expiration Date 03/27/20   PT Treatment Day 1   Plan   Treatment/Interventions Functional transfer training; Therapeutic exercise; Endurance training;Patient/family training;Bed mobility;Gait training;Spoke to nursing;OT   Progress Progressing toward goals   PT Frequency   (3-5x/wk)   Recommendation   Recommendation Post acute IP rehab   Equipment Recommended Walker   PT - OK to Discharge Yes  (to rehab once medically stable)       Michi Villasenor PT, DPT

## 2020-03-16 NOTE — PLAN OF CARE
Problem: Potential for Falls  Goal: Patient will remain free of falls  Description  INTERVENTIONS:  - Assess patient frequently for physical needs  -  Identify cognitive and physical deficits and behaviors that affect risk of falls    -  [place] fall precautions as indicated by assessment   - Educate patient/family on patient safety including physical limitations  - Instruct patient to call for assistance with activity based on assessment  - Modify environment to reduce risk of injury  - Consider OT/PT consult to assist with strengthening/mobility  Outcome: Progressing     Problem: PAIN - ADULT  Goal: Verbalizes/displays adequate comfort level or baseline comfort level  Description  Interventions:  - Encourage patient to monitor pain and request assistance  - Assess pain using appropriate pain scale  - Administer analgesics based on type and severity of pain and evaluate response  - Implement non-pharmacological measures as appropriate and evaluate response  - Notify physician/advanced practitioner if interventions unsuccessful or patient reports new pain   Outcome: Progressing     Problem: SAFETY ADULT  Goal: Maintain or return to baseline ADL function  Description  INTERVENTIONS:  -  Assess patient's ability to carry out ADLs; assess patient's baseline for ADL function and identify physical deficits which impact ability to perform ADLs (bathing, care of mouth/teeth, toileting, grooming, dressing, etc )  - Assess/evaluate cause of self-care deficits   - Assess range of motion  - Assess patient's mobility; develop plan if impaired  - Assess patient's need for assistive devices and provide as appropriate  - Encourage maximum independence but intervene and supervise when necessary  - Involve family in performance of ADLs  - Assess for home care needs following discharge   - Consider OT consult to assist with ADL evaluation and planning for discharge  - Provide patient education as appropriate  Outcome: Progressing  Goal: Maintain or return mobility status to optimal level  Description  INTERVENTIONS:  - Assess patient's baseline mobility status (ambulation, transfers, stairs, etc )    - Identify cognitive and physical deficits and behaviors that affect mobility  - Identify mobility aids required to assist with transfers and/or ambulation (gait belt, sit-to-stand, lift, walker, cane, etc )  - [place] fall precautions as indicated by assessment  - Record patient progress and toleration of activity level on Mobility SBAR; progress patient to next Phase/Stage  - Instruct patient to call for assistance with activity based on assessment  - Consider rehabilitation consult to assist with strengthening/weightbearing, etc   Outcome: Progressing     Problem: DISCHARGE PLANNING  Goal: Discharge to home or other facility with appropriate resources  Description  INTERVENTIONS:  - Identify barriers to discharge w/patient and caregiver  - Arrange for needed discharge resources and transportation as appropriate  - Identify discharge learning needs (meds, wound care, etc )  - Refer to Case Management Department for coordinating discharge planning if the patient needs post-hospital services based on physician/advanced practitioner order or complex needs related to functional status, cognitive ability, or social support system   Outcome: Progressing     Problem: Knowledge Deficit  Goal: Patient/family/caregiver demonstrates understanding of disease process, treatment plan, medications, and discharge instructions  Description  Complete learning assessment and assess knowledge base    Interventions:  - Provide teaching at level of understanding  - Provide teaching via preferred learning methods  Outcome: Progressing     Problem: NEUROSENSORY - ADULT  Goal: Achieves stable or improved neurological status  Description  INTERVENTIONS  - Monitor and report changes in neurological status  - Monitor vital signs such as temperature, blood pressure, glucose, and any other labs ordered   - Initiate measures to prevent increased intracranial pressure  - Monitor for seizure activity and implement precautions if appropriate      Outcome: Progressing  Goal: Achieves maximal functionality and self care  Description  INTERVENTIONS  - Monitor swallowing and airway patency with patient fatigue and changes in neurological status  - Encourage and assist patient to increase activity and self care     - Encourage visually impaired, hearing impaired and aphasic patients to use assistive/communication devices  Outcome: Progressing     Problem: MUSCULOSKELETAL - ADULT  Goal: Maintain or return mobility to safest level of function  Description  INTERVENTIONS:  - Assess patient's ability to carry out ADLs; assess patient's baseline for ADL function and identify physical deficits which impact ability to perform ADLs (bathing, care of mouth/teeth, toileting, grooming, dressing, etc )  - Assess/evaluate cause of self-care deficits   - Assess range of motion  - Assess patient's mobility  - Assess patient's need for assistive devices and provide as appropriate  - Encourage maximum independence but intervene and supervise when necessary  - Involve family in performance of ADLs  - Assess for home care needs following discharge   - Consider OT consult to assist with ADL evaluation and planning for discharge  - Provide patient education as appropriate  Outcome: Progressing     Problem: Prexisting or High Potential for Compromised Skin Integrity  Goal: Skin integrity is maintained or improved  Description  INTERVENTIONS:  - Identify patients at risk for skin breakdown  - Assess and monitor skin integrity  - Assess and monitor nutrition and hydration status  - Monitor labs   - Assess for incontinence   - Turn and reposition patient  - Assist with mobility/ambulation  - Relieve pressure over bony prominences  - Avoid friction and shearing  - Provide appropriate hygiene as needed including keeping skin clean and dry  - Evaluate need for skin moisturizer/barrier cream  - Collaborate with interdisciplinary team   - Patient/family teaching  - Consider wound care consult   Outcome: Progressing     Problem: Nutrition/Hydration-ADULT  Goal: Nutrient/Hydration intake appropriate for improving, restoring or maintaining nutritional needs  Description  Monitor and assess patient's nutrition/hydration status for malnutrition  Collaborate with interdisciplinary team and initiate plan and interventions as ordered  Monitor patient's weight and dietary intake as ordered or per policy  Utilize nutrition screening tool and intervene as necessary  Determine patient's food preferences and provide high-protein, high-caloric foods as appropriate       INTERVENTIONS:  - Monitor oral intake, urinary output, labs, and treatment plans  - Assess nutrition and hydration status and recommend course of action  - Evaluate amount of meals eaten  - Assist patient with eating if necessary   - Allow adequate time for meals  - Recommend/ encourage appropriate diets, oral nutritional supplements, and vitamin/mineral supplements  - Order, calculate, and assess calorie counts as needed  - Assess need for intravenous fluids  - Provide specific nutrition/hydration education as appropriate  - Include patient/family/caregiver in decisions related to nutrition   Outcome: Progressing

## 2020-03-16 NOTE — PROGRESS NOTES
Progress Note - Neurology   Christin Fried 61 y o  male MRN: [id number]  Unit/Bed#: [facility] 706-01 Encounter: [id number]    Assessment/Plan:  51-year-old male with history of recent subacute strokes and right frontal convexity, concern for postprocedural AFib several years ago, prior TIA, CKD, hypertension, hyperlipidemia, presenting with right-sided weakness  MRI brain returned positive for left centrum semiovale/left parasagittal lobe infarctions, likely of embolic etiology given his previous cardiac arrhythmia history/AFib (and questionable anticoagulation compliance of recently)  1  Left hemisphere infarctions, right-sided weakness:  -MRI brain as mentioned above  -CTA head/neck with moderate right M2 narrowing, right P1 narrowing, mild left P1 narrowing, otherwise no critical stenosis or flow restrictive disease/large vessel occlusion  -echocardiogram with EF 60%, no regional wall motion abnormalities, poor atrial visualization  -started on Eliquis 5 mg twice daily over the weekend  Continuing statin as well  -secondary risk factor labs reviewed  -frequent neuro checks  -telemetry monitoring  -stroke education provided  -therapies following  -will arrange for [facility] vascular Neurology follow-up in the office upon discharge  No further inpatient neurologic workup, okay from neurologic standpoint for discharge  Discussed plan of care with attending neurologist     Subjective:   Patient resting in bedside chair, family at bedside  Continues with right-sided weakness, acute focal deficits or changes since last exam     Vitals: Blood pressure 138/88, pulse 65, temperature 98 4 °F (36 9 °C), resp  rate 16, height 5' 8" (1 727 m), weight 113 kg (248 lb 7 3 oz), SpO2 95 %  ,Body mass index is 37 78 kg/m²  Physical Exam:  Physical Exam   Constitutional: He is oriented to person, place, and time  He appears well-developed and well-nourished  HENT:   Head: Normocephalic and atraumatic     Eyes: Pupils are equal, round, and reactive to light  Conjunctivae and EOM are normal    Neck: Normal range of motion  Neck supple  Cardiovascular: Normal rate and regular rhythm  Pulmonary/Chest: Effort normal and breath sounds normal    Abdominal: Soft  Bowel sounds are normal    Musculoskeletal: Normal range of motion  Neurological: He is alert and oriented to person, place, and time  Skin: Skin is warm and dry  Psychiatric: His speech is normal       Neurologic Exam     Mental Status   Oriented to person, place, and time  Follows 2 step commands  Attention: normal  Concentration: normal    Speech: speech is normal   Able to read  Able to repeat  Normal comprehension  Cranial Nerves     CN II   Visual fields full to confrontation  CN III, IV, VI   Pupils are equal, round, and reactive to light  Extraocular motions are normal    Ophthalmoparesis: none  Conjugate gaze: present    CN V   Facial sensation intact  CN VII   Facial expression full, symmetric  CN VIII   CN VIII normal      CN IX, X   CN IX normal    CN X normal      CN XI   CN XI normal      CN XII   CN XII normal      Motor Exam   Muscle bulk: normal  Overall muscle tone: normal  Right arm pronator drift: absent  Left arm pronator drift: absentFull strength throughout left upper lower extremity  3/5 right deltoid, 4/5 triceps, near full right biceps, slight weakness with  strength interosseous muscles, more weakness distally with right lower extremity compared to proximally  Sensory Exam   Light touch normal      Gait, Coordination, and Reflexes   With volitional movements, dysmetric in the right upper extremity in proportion to degree of weakness  Lab, Imaging and other studies:   No new neuroimaging at time of evaluation    CBC:   Results from last 7 days   Lab Units 03/16/20  0453 03/14/20  0529 03/13/20  0558   WBC Thousand/uL 11 49* 11 00* 8 10   RBC Million/uL 5 05 4 97 4 89   HEMOGLOBIN g/dL 15 1 14 8 14 9   HEMATOCRIT % 45 1 44 4 43 2   MCV fL 89 89 88   PLATELETS Thousands/uL 198 178 174   , BMP/CMP:   Results from last 7 days   Lab Units 03/16/20  0453 03/15/20  0727 03/14/20  0529   SODIUM mmol/L 143 140 141   POTASSIUM mmol/L 3 7 3 9 3 9   CHLORIDE mmol/L 108 110* 108   CO2 mmol/L 29 22 27   BUN mg/dL 14 14 11   CREATININE mg/dL 1 08 1 06 1 01   CALCIUM mg/dL 8 8 8 9 8 8   AST U/L  --  18 19   ALT U/L  --  35 33   ALK PHOS U/L  --  136* 125*   EGFR ml/min/1 73sq m 74 76 80   , Vitamin B12:   , HgBA1C:   Results from last 7 days   Lab Units 03/13/20  0558   HEMOGLOBIN A1C % 5 5   , TSH:   , Coagulation:   Results from last 7 days   Lab Units 03/14/20  0124   INR  1 22*   , Lipid Profile:   Results from last 7 days   Lab Units 03/13/20  0558   HDL mg/dL 37*   LDL CALC mg/dL 76   TRIGLYCERIDES mg/dL 55   , Ammonia:   , Urinalysis:       Invalid input(s): URIBILINOGEN, Drug Screen:   , Medication Drug Levels:       Invalid input(s): CARBAMAZEPINE,  PHENOBARB, LACOSAMIDE, OXCARBAZEPINE  VTE Prophylaxis: Sequential compression device (Venodyne)  and Eliquis    Total time spent today 20 minutes  Discussed plan of care with patient/family and primary team:  Reviewed MRI results, continuing anticoagulation with Eliquis, outpatient Stroke team follow-up, continue therapies

## 2020-03-16 NOTE — PROGRESS NOTES
Met with patient to discuss follow up care, stroke education, and discharge planning  Explained neurovascular nurse navigator role  Patient states he resides with sister Ana Prince  She is not present at this time  He is independent off personal and medical care  States sister can assist if needed  Patient has stroke education binder at bedside but states he would like another for his sister, provided an additional copy  Also gave him my card  Educated stroke type, symptoms, personal risk factors and management, medications, and resources  Patient verbalizes understanding of teaching  Plan at this time is for patient to be discharged to inpatient rehab when medically cleared  Answered all his questions  Patient does not have any additional questions or concerns at this time

## 2020-03-16 NOTE — PLAN OF CARE
Problem: OCCUPATIONAL THERAPY ADULT  Goal: Performs self-care activities at highest level of function for planned discharge setting  See evaluation for individualized goals  Description  Treatment Interventions: ADL retraining          See flowsheet documentation for full assessment, interventions and recommendations  Outcome: Progressing  Note:   Limitation: Decreased ADL status, Decreased UE strength, Decreased Safe judgement during ADL, Decreased UE ROM, Decreased cognition, Decreased endurance, Decreased self-care trans, Decreased high-level ADLs, Decreased fine motor control, Visual deficit  Prognosis: Fair  Assessment: Pt participated in occupational therapy with focus on activity tolerance, bed mob, functional transfers/mob, standing balance and tolerance for pt engagement in functional self-care task/UB and LB self-care and pt toileting/toilet transfers  Pt cleared by RN/Isaura for pt participation in therapy  Pt received sitting out of bed to bedside chair and agreeable to therapy following pt Identifiers confirmed  Pt reported his goal today to get stronger  Pt requires assist for functional transfers/mob with RW to/from pt bathroom 2* pt coordination and balance  Pt required assist for UB and LB self-care 2* pt decreased coordination and balance deficits  Pt will require in-pt rehab to continue to address these above noted deficits which currently impair pt ADL and functional mob  Pt chair alarm active post session all needs within reach        OT Discharge Recommendation: Short Term Rehab  OT - OK to Discharge: Yes(to STR when medically stable)

## 2020-03-16 NOTE — PLAN OF CARE
Problem: PHYSICAL THERAPY ADULT  Goal: Performs mobility at highest level of function for planned discharge setting  See evaluation for individualized goals  Description  Treatment/Interventions: Functional transfer training, LE strengthening/ROM, Therapeutic exercise, Endurance training, Patient/family training, Bed mobility, Gait training  Equipment Recommended: Wheelchair, Hall Alvaro       See flowsheet documentation for full assessment, interventions and recommendations  Outcome: Progressing  Note:   Prognosis: Guarded  Problem List: Decreased strength, Decreased endurance, Decreased mobility, Decreased coordination, Decreased cognition, Impaired judgement, Decreased safety awareness  Assessment: Patient seen for physical therapy session with a focus on gait training, functional transfers, standing balance, and activity tolerance  Pt received seated OOB and agreeable to PT session  Pt is motivated to participate in therapy  Improvement noted in RLE strength compared to initial evaluation; increased strength noted in R hip flexion, R knee extension, and ankle DF  Limited movement in R toe extension  Improvement in gait quality as patient able to initiate step with RLE and no longer requires physical assistance to move RLE during ambulation  Increased ambulation distances to 10 feet x 2 + 20 feet  Initially, ambulated with R handrail and HHA x 1 but progressed to RW  Mild R knee buckling but overall good control of knee extensors  VC required for step length and heel strike on initial contact  Increased standing tolerance to approx 5 minutes  Pt continues to demonstrate deficits in gait, lucien, RLE weakness, standing tolerance, coordination, and ambulatory/standing balance  Recommend STR upon discharge once medically stable      Barriers to Discharge: Decreased caregiver support, Inaccessible home environment     Recommendation: Post acute IP rehab     PT - OK to Discharge: Yes(to rehab once medically stable)    See flowsheet documentation for full assessment

## 2020-03-16 NOTE — PROGRESS NOTES
Progress Note - Lidia Shore 1960, 61 y o  male MRN: [id number]    [facility] Encounter: [id number]    Primary Care Provider: Angela Banegas MD   Date and time admitted to hospital: 3/12/2020 11:24 PM        History of CVA (cerebrovascular accident)  Assessment & Plan  Deemed to be embolic event with negative extensive workup in the past  On Xarelto, statin       Class 2 obesity due to excess calories in adult  Assessment & Plan  Low-calorie diet    Hyperlipidemia  Assessment & Plan  On statin    PAF (paroxysmal atrial fibrillation) ([facility] [address] )  Assessment & Plan  On beta-blocker and Xarelto    Essential hypertension  Assessment & Plan  Continue amlodipine and beta-blocker with holding parameters  Admit to telemetry    H/O aortic aneurysm repair  Assessment & Plan  Continue to follow-up with thoracic surgeon as needed  Had NUGYỄN in the past    * 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 [facility])  Assessment & Plan  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   Moderate narrowing of the anterior M2 branch on the right at the origin  Moderate narrowing of the distal P1 segment on the right and mild narrowing of the proximal P1 segment on the left  No occlusion or severe stenosis of the major arteries of the head and neck seen  No stenosis within either internal carotid artery    Patent bilateral vertebral arteries "  Neurology on-call immediately responded, due to current Xarelto use patient took dose at 5 p m  he is t not  candidate for tPA  Due to persistent neurologic deficit he was admitted on stroke pathway  Neurochecks according to protocol  Fall and aspiration precautions  Official speech and swallow evaluation: now on cardiac diet  Echocardiogram: showed 60%, NGUYỄN on Monday, npo past midnight   MRI: Left CHARISSE embolic   Telemetry renewed  Obtain fasting lipid panel hemoglobin A1c  Obtain inflammatory markers: pending   Continue statin Xarelto, switched to apixaban  Neurology recommendations appreciated             VTE Pharmacologic Prophylaxis:   Pharmacologic: Apixaban (Eliquis)  Mechanical VTE Prophylaxis in Place: Yes    Patient Centered Rounds: I have performed bedside rounds with nursing staff today  Discussions with Specialists or Other Care Team Provider: medicine     Education and Discussions with Family / Patient: patient     Time Spent for Care: 45 minutes  More than 50% of total time spent on counseling and coordination of care as described above  Current Length of Stay: 3 day(s)    Current Patient Status: Inpatient   Certification Statement: The patient will continue to require additional inpatient hospital stay due to s/p stroke and need ARC    Discharge Plan: pending patient evaluation     Code Status: Level 1 - Full Code      Subjective:   Patient is doing well  NGUYỄN is cancelled today  He has no complaints  He was started on meals     Objective:     Vitals:   Temp (24hrs), Av 4 °F (36 9 °C), Min:98 °F (36 7 °C), Max:98 6 °F (37 °C)    Temp:  [98 °F (36 7 °C)-98 6 °F (37 °C)] 98 °F (36 7 °C)  HR:  [64-72] 64  Resp:  [16-20] 16  BP: (132-162)/(76-97) 140/87  SpO2:  [95 %-97 %] 97 %  Body mass index is 37 78 kg/m²  Input and Output Summary (last 24 hours): Intake/Output Summary (Last 24 hours) at 3/16/2020 1739  Last data filed at 3/16/2020 1213  Gross per 24 hour   Intake 440 ml   Output 250 ml   Net 190 ml       Physical Exam:     Physical Exam   Constitutional: He is oriented to person, place, and time  HENT:   Head: Normocephalic and atraumatic     Right Ear: External ear normal    Left Ear: External ear normal    Nose: Nose normal    Mouth/Throat: Oropharynx is clear and moist    Eyes: Pupils are equal, round, and reactive to light  Conjunctivae and EOM are normal  Right eye exhibits no discharge  Left eye exhibits no discharge  Neck: Normal range of motion  Neck supple  No JVD present  No tracheal deviation present  No thyromegaly present  Cardiovascular: Normal rate, regular rhythm and intact distal pulses  Exam reveals no gallop and no friction rub  Murmur heard  Pulmonary/Chest: Effort normal and breath sounds normal  No stridor  No respiratory distress  He has no wheezes  He has no rales  Abdominal: Soft  Bowel sounds are normal  He exhibits no distension and no mass  There is no tenderness  There is no rebound and no guarding  No hernia  Musculoskeletal: Normal range of motion  He exhibits no edema, tenderness or deformity  Lymphadenopathy:     He has no cervical adenopathy  Neurological: He is alert and oriented to person, place, and time  He displays normal reflexes  No cranial nerve deficit or sensory deficit  He exhibits normal muscle tone  Coordination normal    Skin: Skin is warm  No rash noted  No erythema  No pallor  Psychiatric: He has a normal mood and affect  His behavior is normal  Thought content normal        Additional Data:     Labs:    Results from last 7 days   Lab Units 03/16/20  0453   WBC Thousand/uL 11 49*   HEMOGLOBIN g/dL 15 1   HEMATOCRIT % 45 1   PLATELETS Thousands/uL 198   NEUTROS PCT % 72   LYMPHS PCT % 18   MONOS PCT % 7   EOS PCT % 3     Results from last 7 days   Lab Units 03/16/20  0453 03/15/20  0727   POTASSIUM mmol/L 3 7 3 9   CHLORIDE mmol/L 108 110*   CO2 mmol/L 29 22   BUN mg/dL 14 14   CREATININE mg/dL 1 08 1 06   CALCIUM mg/dL 8 8 8 9   ALK PHOS U/L  --  136*   ALT U/L  --  35   AST U/L  --  18     Results from last 7 days   Lab Units 03/14/20  0124   INR  1 22*       * I Have Reviewed All Lab Data Listed Above  * Additional Pertinent Lab Tests Reviewed:  [address] 66 Admission Reviewed    Imaging:    Imaging Reports Reviewed Today Include:    Imaging Personally Reviewed by Myself Includes:  *     Recent Cultures (last 7 days):           Last 24 Hours Medication List:     Current Facility-Administered Medications:  amLODIPine 5 mg Oral Daily Flordia Harada, MD   apixaban 5 mg Oral BID Tanja Yoder DO   atorvastatin 40 mg Oral Daily With Umang Jones MD   cholecalciferol 1,000 Units Oral Daily Flordia Harada, MD   docusate sodium 100 mg Oral BID Flordia Harada, MD   metoprolol succinate 150 mg Oral Daily Flordia Harada, MD   ondansetron 4 mg Intravenous Q6H PRN Flordia Harada, MD   pantoprazole 40 mg Oral Early Morning Flordia Harada, MD   senna 1 tablet Oral Daily Flordia Harada, MD        Today, Patient Was Seen By: Aaron Gaytan MD    ** Please Note: Dictation voice to text software may have been used in the creation of this document   **

## 2020-03-16 NOTE — SOCIAL WORK
CM was informed that pt is a tentative dc for tomorrow  CM was informed by Brea hand that pt is accepted pending authorization  Authorization submitted

## 2020-03-16 NOTE — OCCUPATIONAL THERAPY NOTE
Occupational Therapy Treatment Note     03/16/20 6033   Restrictions/Precautions   Weight Bearing Precautions Per Order No   Braces or Orthoses   (none)   Other Precautions Cognitive; Fall Risk; Chair Alarm   Lifestyle   Autonomy Pt reports being I with ADLs, IADLs, and functional mobility without use of DME PTA  Pt is a   Reciprocal Relationships sister (elderly and works)   Service to [employer] in his home PT   Intrinsic Gratification sedentary   ADL   Where Assessed Standing at [employer] Assistance 5  Supervision/Setup   Grooming Deficit Setup;Standing with assistive device;Steadying   UB Bathing Assistance 5  Supervision/Setup   UB Bathing Deficit Setup;Verbal cueing; Increased time to complete   LB Bathing Assistance 3  Moderate Assistance   LB Bathing Deficit Right lower leg including foot; Left lower leg including foot; Buttocks   UB Dressing Assistance 3  Moderate Assistance   UB Dressing Deficit Thread RUE; Thread LUE;Pull around back   LB Dressing Assistance 3  Moderate Assistance   LB Dressing Deficit Thread RLE into pants;Pull up over hips   Transfers   Sit to Stand 3  Moderate assistance   Additional items Assist x 1   Stand to Sit 4  Minimal assistance   Additional items Assist x 1   Functional Mobility   Functional Mobility 4  Minimal assistance   Additional items Rolling walker   Cognition   Overall Cognitive Status Impaired   Arousal/Participation Alert; Responsive; Cooperative   Attention Attends with cues to redirect   Orientation Level Oriented X4   Memory Decreased recall of precautions   Following Commands Follows one step commands with increased time or repetition   Assessment   Assessment Pt participated in occupational therapy with focus on activity tolerance, bed mob, functional transfers/mob, standing balance and tolerance for pt engagement in functional self-care task/UB and LB self-care and pt toileting/toilet transfers    Pt cleared by HERON/Isaura for pt participation in therapy  Pt received sitting out of bed to bedside chair and agreeable to therapy following pt Identifiers confirmed  Pt reported his goal today to get stronger  Pt requires assist for functional transfers/mob with RW to/from pt bathroom 2* pt coordination and balance  Pt required assist for UB and LB self-care 2* pt decreased coordination and balance deficits  Pt will require in-pt rehab to continue to address these above noted deficits which currently impair pt ADL and functional mob  Pt chair alarm active post session all needs within reach      Plan   Treatment Interventions ADL retraining   Goal Expiration Date 03/23/20   OT Treatment Day 2   OT Frequency 3-5x/wk   Recommendation   OT Discharge Recommendation Short Term Rehab   Barthel Index   Feeding 5   Bathing 0   Grooming Score 0   Dressing Score 5   Bladder Score 10   Bowels Score 10   Toilet Use Score 5   Transfers (Bed/Chair) Score 5   Mobility (Level Surface) Score 0   Stairs Score 0   Barthel Index Score 40   Modified Perry Scale   Modified Perry Scale 4       Patience KAPADIAA/L

## 2020-03-16 NOTE — RESTORATIVE TECHNICIAN NOTE
Restorative Specialist Mobility Note       Activity: Ambulate in room, Bathroom privileges, Chair, Dangle, Stand at bedside(Educated/encouraged pt to ambulate with assistance 3-4 x's/day  Chair alarm on   Pt callbell, phone/tray within reach )     Assistive Device: Other (Comment)(HHA x1 )       Campos CESAR, Restorative Technician, [employer] [facility]

## 2020-03-17 ENCOUNTER — APPOINTMENT (INPATIENT)
Dept: NON INVASIVE DIAGNOSTICS | Facility: HOSPITAL | Age: 60
DRG: 041 | End: 2020-03-17
Payer: COMMERCIAL

## 2020-03-17 ENCOUNTER — HOSPITAL ENCOUNTER (INPATIENT)
Facility: HOSPITAL | Age: 60
LOS: 10 days | Discharge: HOME | DRG: 057 | End: 2020-03-27
Payer: COMMERCIAL

## 2020-03-17 VITALS
DIASTOLIC BLOOD PRESSURE: 84 MMHG | BODY MASS INDEX: 37.66 KG/M2 | TEMPERATURE: 98.1 F | SYSTOLIC BLOOD PRESSURE: 132 MMHG | RESPIRATION RATE: 22 BRPM | WEIGHT: 248.46 LBS | HEIGHT: 68 IN | OXYGEN SATURATION: 97 % | HEART RATE: 75 BPM

## 2020-03-17 DIAGNOSIS — I10 ESSENTIAL HYPERTENSION: Primary | Chronic | ICD-10-CM

## 2020-03-17 DIAGNOSIS — K21.9 GERD (GASTROESOPHAGEAL REFLUX DISEASE): ICD-10-CM

## 2020-03-17 DIAGNOSIS — I48.91 A-FIB (HCC): ICD-10-CM

## 2020-03-17 DIAGNOSIS — I63.9 CVA (CEREBRAL VASCULAR ACCIDENT) (HCC): ICD-10-CM

## 2020-03-17 DIAGNOSIS — R76.0 LUPUS ANTICOAGULANT POSITIVE: ICD-10-CM

## 2020-03-17 DIAGNOSIS — E78.5 HYPERLIPIDEMIA: Chronic | ICD-10-CM

## 2020-03-17 DIAGNOSIS — I63.40 CEREBROVASCULAR ACCIDENT (CVA) DUE TO EMBOLISM OF CEREBRAL ARTERY (HCC): ICD-10-CM

## 2020-03-17 LAB
APTT SCREEN TO CONFIRM RATIO: 0.87 RATIO (ref 0–1.4)
APTT-LA IMM 4:1 NP PPP: 46.1 SEC (ref 0–48.9)
B2 GLYCOPROT1 IGA SER-ACNC: <9 GPI IGA UNITS (ref 0–25)
B2 GLYCOPROT1 IGG SER-ACNC: <9 GPI IGG UNITS (ref 0–20)
B2 GLYCOPROT1 IGM SER-ACNC: <9 GPI IGM UNITS (ref 0–32)
CONFIRM APTT/NORMAL: 67.7 SEC (ref 0–55)
DRVVT IMM 1:2 NP PPP: 79.2 SEC (ref 0–47)
DRVVT SCREEN TO CONFIRM RATIO: 2 RATIO (ref 0.8–1.2)
LA PPP-IMP: ABNORMAL
SCREEN APTT: 53.8 SEC (ref 0–51.9)
SCREEN DRVVT: 130.3 SEC (ref 0–47)
THROMBIN TIME: 18 SEC (ref 0–23)

## 2020-03-17 PROCEDURE — C1764 EVENT RECORDER, CARDIAC: HCPCS

## 2020-03-17 PROCEDURE — 33285 INSJ SUBQ CAR RHYTHM MNTR: CPT

## 2020-03-17 PROCEDURE — 99239 HOSP IP/OBS DSCHRG MGMT >30: CPT | Performed by: INTERNAL MEDICINE

## 2020-03-17 PROCEDURE — NC001 PR NO CHARGE

## 2020-03-17 PROCEDURE — 0JH632Z INSERTION OF MONITORING DEVICE INTO CHEST SUBCUTANEOUS TISSUE AND FASCIA, PERCUTANEOUS APPROACH: ICD-10-PCS | Performed by: INTERNAL MEDICINE

## 2020-03-17 PROCEDURE — 99223 1ST HOSP IP/OBS HIGH 75: CPT

## 2020-03-17 PROCEDURE — NC001 PR NO CHARGE: Performed by: INTERNAL MEDICINE

## 2020-03-17 PROCEDURE — 33285 INSJ SUBQ CAR RHYTHM MNTR: CPT | Performed by: INTERNAL MEDICINE

## 2020-03-17 RX ORDER — ATORVASTATIN CALCIUM 40 MG/1
40 TABLET, FILM COATED ORAL
Status: DISCONTINUED | OUTPATIENT
Start: 2020-03-18 | End: 2020-03-27 | Stop reason: HOSPADM

## 2020-03-17 RX ORDER — AMLODIPINE BESYLATE 5 MG/1
5 TABLET ORAL DAILY
Status: CANCELLED | OUTPATIENT
Start: 2020-03-18

## 2020-03-17 RX ORDER — ASPIRIN 81 MG/1
81 TABLET ORAL DAILY
Status: DISCONTINUED | OUTPATIENT
Start: 2020-03-18 | End: 2020-03-27 | Stop reason: HOSPADM

## 2020-03-17 RX ORDER — DOCUSATE SODIUM 100 MG/1
100 CAPSULE, LIQUID FILLED ORAL 2 TIMES DAILY
Status: CANCELLED | OUTPATIENT
Start: 2020-03-17

## 2020-03-17 RX ORDER — PANTOPRAZOLE SODIUM 40 MG/1
40 TABLET, DELAYED RELEASE ORAL
Status: CANCELLED | OUTPATIENT
Start: 2020-03-18

## 2020-03-17 RX ORDER — ATORVASTATIN CALCIUM 40 MG/1
40 TABLET, FILM COATED ORAL
Status: CANCELLED | OUTPATIENT
Start: 2020-03-17

## 2020-03-17 RX ORDER — MELATONIN
1000 DAILY
Status: DISCONTINUED | OUTPATIENT
Start: 2020-03-18 | End: 2020-03-27 | Stop reason: HOSPADM

## 2020-03-17 RX ORDER — ASPIRIN 81 MG/1
81 TABLET ORAL DAILY
Status: DISCONTINUED | OUTPATIENT
Start: 2020-03-18 | End: 2020-03-17 | Stop reason: HOSPADM

## 2020-03-17 RX ORDER — AMLODIPINE BESYLATE 5 MG/1
5 TABLET ORAL DAILY
Status: DISCONTINUED | OUTPATIENT
Start: 2020-03-18 | End: 2020-03-27 | Stop reason: HOSPADM

## 2020-03-17 RX ORDER — SENNOSIDES 8.6 MG
1 TABLET ORAL DAILY
Status: DISCONTINUED | OUTPATIENT
Start: 2020-03-18 | End: 2020-03-27 | Stop reason: HOSPADM

## 2020-03-17 RX ORDER — ONDANSETRON 4 MG/1
4 TABLET, ORALLY DISINTEGRATING ORAL EVERY 8 HOURS PRN
Status: DISCONTINUED | OUTPATIENT
Start: 2020-03-17 | End: 2020-03-27 | Stop reason: HOSPADM

## 2020-03-17 RX ORDER — PANTOPRAZOLE SODIUM 40 MG/1
40 TABLET, DELAYED RELEASE ORAL
Status: DISCONTINUED | OUTPATIENT
Start: 2020-03-18 | End: 2020-03-27 | Stop reason: HOSPADM

## 2020-03-17 RX ORDER — MELATONIN
1000 DAILY
Status: CANCELLED | OUTPATIENT
Start: 2020-03-18

## 2020-03-17 RX ORDER — LIDOCAINE HYDROCHLORIDE AND EPINEPHRINE 10; 10 MG/ML; UG/ML
INJECTION, SOLUTION INFILTRATION; PERINEURAL CODE/TRAUMA/SEDATION MEDICATION
Status: COMPLETED | OUTPATIENT
Start: 2020-03-17 | End: 2020-03-17

## 2020-03-17 RX ORDER — SENNOSIDES 8.6 MG
1 TABLET ORAL DAILY
Status: CANCELLED | OUTPATIENT
Start: 2020-03-18

## 2020-03-17 RX ORDER — BISACODYL 10 MG
10 SUPPOSITORY, RECTAL RECTAL DAILY PRN
Status: DISCONTINUED | OUTPATIENT
Start: 2020-03-17 | End: 2020-03-27 | Stop reason: HOSPADM

## 2020-03-17 RX ORDER — DOCUSATE SODIUM 100 MG/1
100 CAPSULE, LIQUID FILLED ORAL 2 TIMES DAILY
Status: DISCONTINUED | OUTPATIENT
Start: 2020-03-17 | End: 2020-03-27 | Stop reason: HOSPADM

## 2020-03-17 RX ORDER — POLYETHYLENE GLYCOL 3350 17 G/17G
17 POWDER, FOR SOLUTION ORAL DAILY PRN
Status: DISCONTINUED | OUTPATIENT
Start: 2020-03-17 | End: 2020-03-27 | Stop reason: HOSPADM

## 2020-03-17 RX ADMIN — MELATONIN 1000 UNITS: at 08:54

## 2020-03-17 RX ADMIN — AMLODIPINE BESYLATE 5 MG: 5 TABLET ORAL at 08:54

## 2020-03-17 RX ADMIN — METOPROLOL SUCCINATE 150 MG: 100 TABLET, EXTENDED RELEASE ORAL at 08:54

## 2020-03-17 RX ADMIN — LIDOCAINE HYDROCHLORIDE,EPINEPHRINE BITARTRATE 10 ML: 10; .01 INJECTION, SOLUTION INFILTRATION; PERINEURAL at 14:58

## 2020-03-17 RX ADMIN — PANTOPRAZOLE SODIUM 40 MG: 40 TABLET, DELAYED RELEASE ORAL at 06:53

## 2020-03-17 RX ADMIN — ATORVASTATIN CALCIUM 40 MG: 40 TABLET, FILM COATED ORAL at 16:28

## 2020-03-17 RX ADMIN — APIXABAN 5 MG: 5 TABLET, FILM COATED ORAL at 08:54

## 2020-03-17 NOTE — DISCHARGE INSTRUCTIONS
Keep loop recorder incision dry for one week  Do not use lotions/powders/creams on incision  Remove outer bandage 48 hours after procedure - if present, leave underlying steri-strips in place, they will either fall off on their own or will be removed at 2 week follow up appointment  Please call the office [phone] if you notice redness, swelling, bleeding, or drainage from incision or if you develop fevers  Cardiac Loop Recorder Insertion      WHAT YOU SHOULD KNOW:    A cardiac loop recorder is a device used to diagnose heart rhythm problems, such as a fast or irregular heartbeat  It is implanted in your left chest, just under the skin  The device records a pattern of your heart's rhythm, called an EKG  Your device records automatic EKGs, depending on how your caregiver programs it  You may also receive a handheld controller  You press a button on the controller when you have symptoms, such as dizziness, lightheadedness, or palpitations  The device will record an EKG at that moment  The recording can help your caregiver see if your symptoms may be caused by heart rhythm problems  Your caregiver will remove the device after it has collected enough data  You may need the device for up to 3 years  The procedure to remove the device is similar to the procedure used to implant it       AFTER YOU LEAVE:    Follow up with your cardiologist as directed: You will need to return in 1 to 2 weeks  Your cardiologist will check your incision  He may also program your device settings again  He will retrieve data from the device every 1 to 3 months with a monitor held over your skin  You may be able to transmit data from your device from home as well  You will do this by calling a number provided by your cardiologist, or as they have instructed you  Ask for information about this process  Write down your questions so you remember to ask them during your visits       Wound care: Keep loop recorder incision dry for one week  Do not use lotions/powders/creams on incision  Remove outer bandage 48 hours after procedure - leave underlying steri-strips in place, they will either fall off on their own or will be removed at 2 week follow up appointment  Please call the office if you notice redness, swelling, bleeding, or drainage from incision or if you develop fevers  After that first week, carefully wash your incision with soap and water  Keep the area clean and dry until it heals       Return to activity: If you received anesthesia, you will not be able to drive for 24 hours  Otherwise, most people can return to normal activities soon after the procedure  Your cardiologist may want to know if your work involves electrical current or high-voltage equipment  Ask about other electrical items that could interfere with your cardiac loop recorder       Contact your cardiologist if:   · You have a fever or chills  · Your wound is red, swollen, or draining pus  · You have questions or concerns about your condition or care  Seek care immediately or call 911 if:   · You feel weak, dizzy, or faint  · You lose consciousness  © 2014 [employer] is for End User's use only and may not be sold, redistributed or otherwise used for commercial purposes  All illustrations and images included in CareNotes® are the copyrighted property of A D [employer] M [employer]  or [employer]che [employer]go[employer]  The above information is an  only  It is not intended as medical advice for individual conditions or treatments  Talk to your doctor, nurse or pharmacist before following any medical regimen to see if it is safe and effective for you

## 2020-03-17 NOTE — PROGRESS NOTES
PHYSICAL MEDICINE AND REHABILITATION   PREADMISSION ASSESSMENT     Projected [facility] Diagnoses:  Impairment of mobility, safety and Activities of Daily Living (ADLs) due to Stroke:  01 2  Right Body Involvement (Left Brain)  Etiologic Dx: Acute Ischemic Left CHARISSE CVA  Date of Onset: 3/12/2020   Date of surgery: 3/17/2020 Loop Recorder Insertion    PATIENT INFORMATION  Name: Say Paris Phone #: [phone] (home)   Address: [address] [place] [address]  YOB: 1960 Age: 61 y o  SS#   Marital Status:   Ethnicity:   Employment Status: on disability - PT   Extended Emergency Contact Information  Primary Emergency Contact: Le Harper   [address] Phone: [phone]  Mobile Phone: [phone]  Relation: Sister  Advance Directive: Level 1 Full Code - unknown advanced directive    INSURANCE/COVERAGE:     Primary Payor: BLUE CROSS  REP / Plan: Mervin Terrell [facility]LE[facility] REP / Product Type: Medicare HMO /   Secondary Payer: Private Pay   Payer Contact: Marty Freedman Payer Contact:   Contact Phone: [phone] ext  [address] [place] [address] Fax: [phone] Contact Phone:     Authorization #: [id number]  Coverage Dates: 3/17-3/23 (7 days)  LCD: 3/23 with review  MEDICARE #: [id number]   Medicare Days:   Medical Record #: [id number]    REFERRAL SOURCE:   Referring provider: Terri Fu MD  Referring facility: [address] [place] [address]  Room: [place] [address]/[place] [address]  PCP: Leoncio Mac MD PCP phone number: [phone]    MEDICAL INFORMATION  HPI: Patient is a 61year old male that presented to [place] [address] on 3/12/2020 with RLE flaccid paralysis and RUE weakness  Patient currently on Xarelto for previous CVA in 2014 and known PAF  Patient was last known normal before going to bed night prior  Patient states he woke up while sleeping, and fell out of bed due to RLE weakness   Per patient, he endorsed neck pain, paroxysmal numbness and dizziness over the past week  Blood pressure was elevated at 194/93  Stroke alert was called to an NIHSS of 6  Patient was not a tPA candidate due to bleeding risk as already taking Xarelto  CT of the brain showed no acute intracranial process  CTA of the head/neck showed 2mm inferiorly oriented aneurysm of supraclinoid portion of right ICA; moderate narrowing of anterior M2 branch of right at origin, moderate narrowing of distal P1 segment on right and mild narrowing of proximal P1 segment on left; no occlusion or severe stenosis of major arteries of head and neck; no stenosis within either ICA; patent bilateral vertebral arteries  Patient was without occlusion of major vessel that could be addressed with clot retrieval, and patient was admitted to stroke pathway, continuing on statin and Xarelto  Neurology was consulted, with suspected ischemic stroke likely cardioembolic due to history of PAF  Patient was allowed for permissive HTN, with recommendations to check ECHO and MRI of the brain  ECHO showed an EF of 60%, no regional wall motion abnormalities; grade 1 diastolic dysfunction, RV mildly dilated, trace TV regurgitation  MRI of the brain showed small acute infarct involving left centrum semiovale and high left parasagittal frontal lobe cortex near the vertex; stable scattered additional areas of chronic microvascular ischemic change and chronic hemosiderin deposition  There was concern for potential Xarelto failure, as patient experienced 2 separate strokes within months of each other  Patient stated compliance with medication for the most part, however per Neurology, patient was changed to Eliquis for continued [facility] management  On 3/17, patient underwent loop recorder insertion to assess for continued presence or absence of PAF  Patient is currently on a Cardiac diet, and is tolerating well with aspiration precautions in place   Patient has remained hemodynamically stable, and is medically cleared for discharge to [facility] this afternoon  PT/OT/ST therapies and PM&R were consulted, and they are recommending patient for inpatient Acute Rehab  He has demonstrated that he can tolerate and participate in 3 hours of therapy per day  Past Medical History:   Past Surgical History: Allergies:     Past Medical History:   Diagnosis Date    Aneurysm of thoracic aorta ([address] )     last assessed 11/20/17    Anxiety     currently on no meds    Cardiac disease     Cholelithiasis     Chronic kidney disease     GERD (gastroesophageal reflux disease)     Headache due to anesthesia     Hyperlipidemia     Hypertension     Irritable bowel syndrome     Kidney stone     Palpitations     PONV (postoperative nausea and vomiting)     Shortness of breath     ? related to acid reflux    Sleep apnea     not sure    Stroke [facility]) 11/2014    TIA (transient ischemic attack)     Past Surgical History:   Procedure Laterality Date    AORTA SURGERY      thoracic - aneurysmorrhaphy    APPENDECTOMY      ASCENDING AORTIC ANEURYSM REPAIR      resolved 8/19/15    CHOLECYSTECTOMY      laparoscopic    CYSTOSCOPY      with removal of object- stent removal    KNEE ARTHROSCOPY Right 1996    with medial meniscus repair    LITHOTRIPSY      renal    DE FRAGMENT KIDNEY STONE/ ESWL Left 5/17/2018    Procedure: LITHROTRIPSY EXTRACORPORAL SHOCKWAVE (ESWL); Surgeon: Rg Curry MD;  Location: [facility]  MAIN OR;  Service: Urology    THUMB ARTHROSCOPY Right 1976    ligament repair     Allergies   Allergen Reactions    Losartan Angioedema    Bactrim [Sulfamethoxazole-Trimethoprim]     Lisinopril      Felt bad, was OK with Zestril brand name      Tramadol          Comorbidities/Surgeries in the last 100 days: Right hemiparesis, right facial droop, motor apraxia, Afib on Xarelto, HTN, CVA, anxiety, CAD, CKD, GERD, HLD, IBS    CURRENT VITAL SIGNS:   Temp:  [98 °F (36 7 °C)-98 5 °F (36 9 °C)] 98 1 °F (36 7 °C)  HR:  [60-75] 75  Resp:  [22] 22  BP: (132-143)/(84-88) 132/84   Intake/Output Summary (Last 24 hours) at 3/17/2020 1506  Last data filed at 3/17/2020 1214  Gross per 24 hour   Intake 380 ml   Output 300 ml   Net 80 ml        LABORATORY RESULTS:      Lab Results   Component Value Date    HGB 15 1 03/16/2020    HGB 14 1 03/29/2017    HCT 45 1 03/16/2020    HCT 43 2 03/29/2017    WBC 11 49 (H) 03/16/2020    WBC 8 1 03/29/2017    WBC 0-5 03/29/2017     Lab Results   Component Value Date    BUN 14 03/16/2020    BUN 10 02/02/2018     03/29/2017    K 3 7 03/16/2020    K 3 8 02/02/2018     03/16/2020     02/02/2018    GLUCOSE 103 12/08/2014    CREATININE 1 08 03/16/2020    CREATININE 1 18 03/29/2017     Lab Results   Component Value Date    PROTIME 15 0 (H) 03/14/2020    PROTIME 13 9 11/13/2014    INR 1 22 (H) 03/14/2020    INR 1 09 11/13/2014        DIAGNOSTIC STUDIES:  X-ray Chest 1 View Portable    Result Date: 3/13/2020  Impression: Suboptimal inspiration with mild cardiomegaly  No acute cardiopulmonary disease  Workstation performed: [id number]     Mri Brain Wo Contrast    Result Date: 3/14/2020  Impression: Small acute infarct involving the left centrum semiovale and high left parasagittal frontal lobe cortex near the vertex  No associated acute intracranial hemorrhage  Stable scattered additional areas of chronic microvascular ischemic change and chronic hemosiderin deposition  The study was marked in [facility] for immediate notification  Workstation performed: [id number]     Ct Stroke Alert Brain    Result Date: 3/13/2020  Impression: No acute intracranial process is seen  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 (axial image 171, series 6 and sagittal image 55, series 602)  Moderate narrowing of the anterior M2 branch on the right at the origin (axial image 169, series 6)   Moderate narrowing of the distal P1 segment on the right and mild narrowing of the proximal P1 segment on the left  No occlusion or severe stenosis of the major arteries of the head and neck seen  No stenosis within either internal carotid artery  Patent bilateral vertebral arteries  Sinus disease as described  Other findings as above  Findings discussed with Dr Alecia Brasher by Dr Khadijah Greenfield at 12:10 AM on 3/13/2020  Workstation performed: [id number]     Cta Stroke Alert (head/neck)    Result Date: 3/13/2020  Impression: No acute intracranial process is seen  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 (axial image 171, series 6 and sagittal image 55, series 602)  Moderate narrowing of the anterior M2 branch on the right at the origin (axial image 169, series 6)  Moderate narrowing of the distal P1 segment on the right and mild narrowing of the proximal P1 segment on the left  No occlusion or severe stenosis of the major arteries of the head and neck seen  No stenosis within either internal carotid artery  Patent bilateral vertebral arteries  Sinus disease as described  Other findings as above  Findings discussed with Dr Alecia Brasher by Dr Khadijah Greenfield at 12:10 AM on 3/13/2020  Workstation performed: [id number]       PRECAUTIONS/SPECIAL NEEDS:  Tobacco:   Social History     Tobacco Use   Smoking Status Never Smoker   Smokeless Tobacco Never Used   Tobacco Comment    no second hand smoke exposure   , Alcohol:    Social History     Substance and Sexual Activity   Alcohol Use Not Currently   , Anticoagulation:  Eliquis 5mg PO BID, Edema Management, Safety Concerns, Pain Management, Aspiration Risk/Precautions, Dietary Restrictions: Cardiac diet, Language Preference: English and Fall Precautions      MEDICATIONS:     Current Facility-Administered Medications:     amLODIPine (NORVASC) tablet 5 mg, 5 mg, Oral, Daily, Jamison Pate MD, 5 mg at 03/17/20 0854    apixaban (ELIQUIS) tablet 5 mg, 5 mg, Oral, BID, Jovita Mcguire DO, 5 mg at 03/17/20 0854    atorvastatin (LIPITOR) tablet 40 mg, 40 mg, Oral, Daily With Barbie Denise MD, 40 mg at 03/16/20 1721    cholecalciferol (VITAMIN D3) tablet 1,000 Units, 1,000 Units, Oral, Daily, Severiano Freedman MD, 1,000 Units at 03/17/20 0854    docusate sodium (COLACE) capsule 100 mg, 100 mg, Oral, BID, Severiano Freedman MD, 100 mg at 03/16/20 0935    metoprolol succinate (TOPROL-XL) 24 hr tablet 150 mg, 150 mg, Oral, Daily, Severiano Freedman MD, 150 mg at 03/17/20 0854    ondansetron (ZOFRAN) injection 4 mg, 4 mg, Intravenous, Q6H PRN, Severiano Freedman MD, 4 mg at 03/15/20 0819    pantoprazole (PROTONIX) EC tablet 40 mg, 40 mg, Oral, Early Morning, Severiano Freedman MD, 40 mg at 03/17/20 5559    senna (SENOKOT) tablet 8 6 mg, 1 tablet, Oral, Daily, Severiano Freedman MD, 8 6 mg at 03/13/20 0907    SKIN INTEGRITY:   no rashes, no erythema, no peripheral edema    PRIOR LEVEL OF FUNCTION:  He lives in [facility] family home  Mohinder Nicholas is  and lives with their family -sister  Self Care: Independent, Indoor Mobility: Independent, Stairs (in/outdoor): Independent and Cognition: Independent   Prior to patient's admission, patient was fully Independent with ADL's and IADL's, including driving and working part-time as a   Patient was Independent without assistive device for gait and transfers  FALLS IN THE LAST 6 MONTHS: none  HOME ENVIRONMENT:  The living area: bedroom on 2nd floor and bathroom on 2nd floor  There are 6 steps to enter the home, with full flight to 2nd level of home  The patient will not have 24 hour supervision/physical assistance available upon discharge  Patient's sister works during the day, and patient is alone during that day  Patient's sister is able to provide supervision only when at home      PREVIOUS DME:  Equipment in home (previous DME): None    FUNCTIONAL STATUS:  Physical Therapy Occupational Therapy Speech Therapy 3/16/2020, per PT    Bed Mobility   Additional Comments Not performed  Pt seated OOB upon arrival  Concluded session with patient seated OOB with chair alarm on  Transfers   Sit to Stand 3  Moderate assistance   Additional items Assist x 1; Increased time required;Verbal cues  (Progressed to min A x 1)   Stand to Sit 3  Moderate assistance   Additional items Assist x 1; Increased time required;Verbal cues  (Progressed to min A )   Toilet transfer 3  Moderate assistance   Additional items Assist x 1; Increased time required;Verbal cues; Commode   Additional Comments Required mod A x 1 initially, progressed to min A x 1 with subsequent transfers  Ambulation/Elevation   Gait pattern R Hemiparesis;R Knee Cecilia; Improper Weight shift; Shuffling; Short stride; Ataxia  (Improvement in R hip flexion/R knee flexion; Leans towards R during L swing phase) (decreased R heel strike/toe extension on swing phase)    Gait Assistance 3  Moderate assist  (chair follow)   Additional items Assist x 1;Verbal cues  (VC for step lenght and heel strike)   Assistive Device Rolling walker  (10 feet x 2 with R handrail; 20+10 feet with RW)   Distance 10 feet x 2 feet + 20 feet + 10 feet - required several seated rest break  Balance   Static Sitting Fair -   Dynamic Sitting Poor +   Static Standing Poor +   Dynamic Standing Poor +   Ambulatory Poor   Endurance Deficit   Endurance Deficit Yes   Endurance Deficit Description RLE weakness   Activity Tolerance   Activity Tolerance Patient limited by fatigue   Medical Staff Made Aware JORGE ARGUETA   Nurse Made Aware Yes, HERON Constantino   Exercises   Balance training  Dynamic/standing tolerance for 5 minutes x 2 + 2 minutes x 2, required min A x 1 for static/dynamic balance  Assessment   Prognosis Guarded   Problem List Decreased strength;Decreased endurance;Decreased mobility; Decreased coordination;Decreased cognition; Impaired judgement;Decreased safety awareness   Assessment Patient seen for physical therapy session with a focus on gait training, functional transfers, standing balance, and activity tolerance  Pt received seated OOB and agreeable to PT session  Pt is motivated to participate in therapy  Improvement noted in RLE strength compared to initial evaluation; increased strength noted in R hip flexion, R knee extension, and ankle DF  Limited movement in R toe extension  Improvement in gait quality as patient able to initiate step with RLE and no longer requires physical assistance to move RLE during ambulation  Increased ambulation distances to 10 feet x 2 + 20 feet  Initially, ambulated with R handrail and HHA x 1 but progressed to RW  Mild R knee buckling but overall good control of knee extensors  VC required for step length and heel strike on initial contact  Increased standing tolerance to approx 5 minutes  Pt continues to demonstrate deficits in gait, lucien, RLE weakness, standing tolerance, coordination, and ambulatory/standing balance  Recommend STR upon discharge once medically stable  3/16/2020, per ARGUETA    ADL   Where Assessed Standing at sink   Grooming Assistance 5  Supervision/Setup   Grooming Deficit Setup;Standing with assistive device;Steadying   UB Bathing Assistance 5  Supervision/Setup   UB Bathing Deficit Setup;Verbal cueing; Increased time to complete   LB Bathing Assistance 3  Moderate Assistance   LB Bathing Deficit Right lower leg including foot; Left lower leg including foot; Buttocks   UB Dressing Assistance 3  Moderate Assistance   UB Dressing Deficit Thread RUE; Thread LUE;Pull around back   LB Dressing Assistance 3  Moderate Assistance   LB Dressing Deficit Thread RLE into pants;Pull up over hips   Transfers   Sit to Stand 3  Moderate assistance   Additional items Assist x 1   Stand to Sit 4  Minimal assistance   Additional items Assist x 1   Functional Mobility   Functional Mobility 4  Minimal assistance   Additional items Rolling walker   Cognition   Overall Cognitive Status Impaired   Arousal/Participation Alert; Responsive; Cooperative   Attention Attends with cues to redirect   Orientation Level Oriented X4   Memory Decreased recall of precautions   Following Commands Follows one step commands with increased time or repetition   Assessment   Assessment Pt participated in occupational therapy with focus on activity tolerance, bed mob, functional transfers/mob, standing balance and tolerance for pt engagement in functional self-care task/UB and LB self-care and pt toileting/toilet transfers  Pt cleared by RN/Isaura for pt participation in therapy  Pt received sitting out of bed to bedside chair and agreeable to therapy following pt Identifiers confirmed  Pt reported his goal today to get stronger  Pt requires assist for functional transfers/mob with RW to/from pt bathroom 2* pt coordination and balance  Pt required assist for UB and LB self-care 2* pt decreased coordination and balance deficits  Pt will require in-pt rehab to continue to address these above noted deficits which currently impair pt ADL and functional mob  Pt chair alarm active post session all needs within reach  3/13/2020, per SLP    Summary:  Pt presents w/ clear and fluent speech  No s/s oral/pharyngeal dysphagia       Recommendations:  Diet: Regular  Liquid: thin  Meds:as tgolerated  Supervision: none at this time  Positioning:Upright  Strategies: Pt to take PO/Meds only when fully alert and upright  Oral care  Reflux precautions  Eval only, No f/u tx indicated       Patient's goal: was hoping the "parade" in his room would stop     Consider consult w/:  Nutrition     Reason for consult:  R/o aspiration  Determine safest and least restrictive diet  New neuro event  Stroke protocol  Current diet:  npo  Premorbid diet[de-identified]  regular  Previous VBS:  none  O2 requirement:  none  Voice/Speech:  Wnl   Flat affect  Social:  Home w/ family  Follows commands:  yes             Cognitive Status:  A&O  [facility] exam:  Dentition:natural  Labial strength and ROM:+  Lingual strength and ROM:+  Mandibular strength and ROM:+  Secretion management:+        Items administered: Toast, thin liquids  Fed self  Left handed  Oral stage:+  Lip closure:+  Mastication:+  Bolus formation:+  Bolus control:+  Transfer:+  Oral residue:+  Pocketing:+     Pharyngeal stage:+  Swallow promptness:+  Laryngeal rise:+  Wet voice:-  Throat clear:-  Cough:-  Secondary swallows:-  Audible swallows:-  No overt s/s aspiration     Esophageal stage:  H/o GERD     Results d/w:  Pt, nursing, family        Chief Complaint:   Acute onset of right-sided weakness  History of Present Illness:  Raad Fried is a 61 y  o  male with past medical history of ascending aortic aneurysm repair , PAF on Xarelto, previous TIA/stroke who presented from home as a stroke alert for evaluation of acute onset of right-sided weakness patient reports that he went to the bed around 9:30 p m  Approximately 1 hour later he attempted to stand up and fell down  due to flaccid right lower extremity   Upon initial presentation his NIH score was 6  CT of the head stroke alert and CTA head and neck did not reveal acute pathology dissection or severe stenosis however patient was not a candidate for tPA due to Xarelto taken by patient at 5 p m  Today  EKG shows sinus rhythm   Hemodynamically he remained stable  He admitted on stroke pathway for further evaluation of persistent neurologic deficit                   CURRENT GAP IN FUNCTION  Prior to Admission: Functional Status: Patient was independent with mobility/ambulation, transfers, ADL's, IADL's  Estimated length of stay: 10 to 14 days    Anticipated Post-Discharge Disposition/Treatment  Disposition: Return to previous home/apartment    Outpatient Services: Physical Therapy (PT), Occupational Therapy (OT) and Speech Therapy    BARRIERS TO DISCHARGE  Weakness, Pain, Balance Difficulty, Fatigue, Home Accessibility, Caregiver Accessibility, Financial Resources, Equipment Needs and Resource Availability    INTERVENTIONS FOR DISCHARGE  Adaptive equipment, Patient/Family/Caregiver Education, [employer], Support Group, Financial Assistance, Arrange DME needs, Medication Changes as per MD recommendations, Therapy exercises, Center of balance support  and Energy conservation education     REQUIRED THERAPY:  Patient will require PT, OT and ST 60 minutes each per day, five days per week to achieve rehab goals  REQUIRED FUNCTIONAL AND MEDICAL MANAGEMENT FOR INPATIENT REHABILITATION:  Skin:  There are no pressure sores currently, Pain Management: Overall pain is well controlled, Deep Vein Thrombosis (DVT) Prophylaxis:  Eliquis 5mg PO BID, further IM management of additional medical conditions while on the ARC, he needs PT/OT/ST intervention, patient/family education and training, possible Neuropsych and Neurology consults with any other needed consults prn, nursing medication review and management of bowel/bladder function  Patient/family can participate in unit based stroke education  RECOMMENDED LEVEL OF CARE:   Patient presented to [place] [address] on 3/12/2020 with RLE flaccid paralysis and RUE weakness  Patient currently on Xarelto for previous CVA in 2014 and known PAF  Blood pressure was elevated at 194/93  Stroke alert was called to an NIHSS of 6  Patient was not a tPA candidate due to bleeding risk as already taking Xarelto  CT of the brain showed no acute intracranial process  CTA of the head/neck showed 2mm inferiorly oriented aneurysm of supraclinoid portion of right ICA; moderate narrowing of anterior M2 branch of right at origin, moderate narrowing of distal P1 segment on right and mild narrowing of proximal P1 segment on left; no occlusion or severe stenosis of major arteries of head and neck; no stenosis within either ICA; patent bilateral vertebral arteries   Patient was without occlusion of major vessel that could be addressed with clot retrieval, and patient was admitted to stroke pathway, continuing on statin and Xarelto  Neurology was consulted, with suspected ischemic stroke likely cardioembolic due to history of PAF  Patient was allowed for permissive HTN, with recommendations to check ECHO and MRI of the brain  ECHO showed an EF of 60%, no regional wall motion abnormalities; grade 1 diastolic dysfunction, RV mildly dilated, trace TV regurgitation  MRI of the brain showed small acute infarct involving left centrum semiovale and high left parasagittal frontal lobe cortex near the vertex; stable scattered additional areas of chronic microvascular ischemic change and chronic hemosiderin deposition  There was concern for potential Xarelto failure, as patient experienced 2 separate strokes within months of each other  Patient stated compliance with medication for the most part, however per Neurology, patient was changed to Eliquis for continued [facility] management  On 3/17, patient underwent loop recorder insertion to assess for continued presence or absence of PAF  Patient is currently on a Cardiac diet, and is tolerating well with aspiration precautions in place  Prior to patient's admission, patient was fully Independent with ADL's and IADL's, including driving and working part-time as a   Patient was Independent without assistive device for gait and transfers  Currently, patient is Mod assist of 1 with RW for gait and transfers, and Supervision/Mod assist for UB ADL's, Mod assist for LB ADL's  Close medical management and PM&R management is recommended at this time while patient is on the [facility]  Inpatient acute rehab is recommended for patient to maximize overall strength and mobility to Modified Independent upon discharge to home with support of family

## 2020-03-17 NOTE — PROGRESS NOTES
Discussed w Dr Jose Sánchez  Since he has prior stroke on Xarelto but was previously well controlled on aspiring, will Add Aspirin 81mg daily to Eliquis

## 2020-03-17 NOTE — RESTORATIVE TECHNICIAN NOTE
Restorative Specialist Mobility Note       Activity: Ambulate in berkowitz, Ambulate in room, Bathroom privileges, Chair, Dangle, Stand at bedside(Educated/encouraged pt to ambulate with assistance 3-4 x's/day  Chair alarm on   Pt callbell, phone/tray within reach )     Assistive Device: Front wheel walker, Other (Comment)(Assist x1 with chair follow )          Tanesha CESAR, Restorative Technician, [employer]

## 2020-03-17 NOTE — SOCIAL WORK
CM was informed that pt is medically stable for dc today  CM was informed by Amanda Nicholas liaison that authorization was received and a bed is available today  Pt is scheduled for a 4pm dc to [facility] room 966  CM notified pt, pt's bedside RN Shanti, and pt's mother Germania Diaz [phone] of dc time  Message left for pt's sister Kayla Kirkland [phone] to inform of dc time

## 2020-03-17 NOTE — DISCHARGE SUMMARY
Transition of Care Discharge Summary - [facility] Internal Medicine    Patient Information: Mariza Green 61 y o  male MRN: [id number]  Unit/Bed#: [address] 706-01 Encounter: [id number]    Discharging Physician / Practitioner: Ez Huffman MD  PCP: Yojana Keith MD  Admission Date: 3/12/2020  Discharge Date: 03/17/20    Disposition:      [address] to [facility] SNF:   · Not Applicable to this Patient - Not Applicable to this Patient    Reason for Admission:  Right-sided weakness    Discharge Diagnoses:     Principal Problem:    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 ([facility] [address] [facility] )  Active Problems:    H/O aortic aneurysm repair    Essential hypertension    PA[facility]oxys[facility] )    Hyperlipidemia    Class 2 obesity due to excess calories in adult    History of CVA (cerebrovascular accident)  Resolved Problems:    * No resolved hospital problems  *      Consultations During Hospital Stay:  · Neurology  · PMR    Procedures Performed:     · Cardiac loop recorder implanted    Medication Adjustments and Discharge Medications:  · Summary of Medication Adjustments made as a result of this hospitalization:  See medication list  · Medication Dosing Tapers - Please refer to Discharge Medication List for details on any medication dosing tapers (if applicable to patient)  · Medications being temporarily held (include recommended restart time): None  · Discharge Medication List: See after visit summary for reconciled discharge medications  Wound Care Recommendations:  When applicable, please see wound care section of After Visit Summary  Diet Recommendations at Discharge:  Diet -        Diet Orders   (From admission, onward)             Start     Ordered    03/16/20 1128  Diet Cardiovascular; Cardiac  Diet effective now     Question Answer Comment   Diet Type Cardiovascular    Cardiac Cardiac    RD to adjust diet per protocol?  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 (axial image 171, series 6 and sagittal image 55, series 602)   Moderate narrowing of the anterior M2 branch on the right at the origin (axial image 169, series 6)  Moderate narrowing of the distal P1 segment on the right and mild narrowing of the proximal P1 segment on the left      No occlusion or severe stenosis of the major arteries of the head and neck seen    No stenosis within either internal carotid artery      Patent bilateral vertebral arteries    Sinus disease as described  Echocardiogram: LEFT VENTRICLE:  Systolic function was normal  Ejection fraction was estimated to be 60 %  There were no regional wall motion abnormalities  Wall thickness was mildly increased  The changes were consistent with concentric remodeling (increased wall thickness with normal wall mass)  Doppler parameters were consistent with abnormal left ventricular relaxation (grade 1 diastolic dysfunction)    VENTRICULAR SEPTUM:  There was mild dyssynergic motion  These changes are consistent with a post-thoracotomy state    RIGHT VENTRICLE:  The ventricle was at least mildly dilated  Wall thickness was increased  Not well visualized    TRICUSPID VALVE:  There was trace regurgitation      Anti cardiolipin IgA antibody:  25  Incidental Findings:   · None    Test Results Pending at Discharge (will require follow up):   · PTT - LA mixing study  · Factor 5 alessia  · Lupus anticoagulant  · Prothrombin G mutation  · Beta 2 glycoprotein antibodies     Outpatient Tests Requested:  · None    Complications:  None    Hospital Course:     Wilmar Barboza is a 61 y o  male patient who originally presented to the hospital on 3/12/2020 due to right-sided weakness  The patient presented to the ED and stroke alert was called  Neurology on-call responded but due to his current Xarelto use he was not a candidate for tPA  He was admitted for further evaluation  The patient had a CTA and MRI of the brain confirming the diagnosis  The source of his stroke was unclear at the time of discharge, hypercoagulable workup was still pending, a loop recorder was placed  NGUYỄN was felt to be less relevant and canceled  Clinically he remains stable  Xarelto was changed to Eliquis  A baby Aspirin was added  He was discharged to inpatient rehabilitation to follow-up with cardiology and neurology as an outpatient  Condition at Discharge: stable     Discharge Day Visit / Exam:     Subjective:  Denies any acute complaints, stating that his neurologic deficits are stable  Vitals: Blood Pressure: 132/84 (03/17/20 0801)  Pulse: 75 (03/17/20 0801)  Temperature: 98 1 °F (36 7 °C) (03/17/20 0801)  Temp Source: Oral (03/13/20 0118)  Respirations: 22 (03/16/20 2218)  Height: 5' 8" (172 7 cm) (03/13/20 0222)  Weight - Scale: 113 kg (248 lb 7 3 oz) (03/13/20 0222)  SpO2: 97 % (03/17/20 0801)  Exam:   Physical Exam   Constitutional: He is oriented to person, place, and time  He appears well-developed and well-nourished  HENT:   Head: Normocephalic and atraumatic  Eyes: EOM are normal    Neck: Neck supple  Pulmonary/Chest: Effort normal    Neurological: He is alert and oriented to person, place, and time     Right hemiparesis         Goals of Care Discussions:  · Code Status at Discharge: Level 1 - Full Code  · Were there any Goals of Care Discussions during Hospitalization?: No  · Results of any General Goals of Care Discussions: N/A   · POLST Completed: No   · If POLST Completed, Summary of POLST Agreement Provided Here: N/A   · OK to Rehospitalize if Needed? Yes    Discharge instructions/Information to patient and family:   See after visit summary section titled Discharge Instructions for information provided to patient and family  Planned Readmission:  Yes, acute rehab      Discharge Statement:  I spent 45 minutes discharging the patient  This time was spent on the day of discharge  I had direct contact with the patient on the day of discharge  Greater than 50% of the total time was spent examining patient, answering all patient questions, arranging and discussing plan of care with patient as well as directly providing post-discharge instructions  Additional time then spent on discharge activities      ** Please Note: This note has been constructed using a voice recognition system **

## 2020-03-18 LAB
ALBUMIN SERPL BCP-MCNC: 3.5 G/DL (ref 3.5–5)
ALP SERPL-CCNC: 129 U/L (ref 46–116)
ALT SERPL W P-5'-P-CCNC: 35 U/L (ref 12–78)
ANION GAP SERPL CALCULATED.3IONS-SCNC: 3 MMOL/L (ref 4–13)
AST SERPL W P-5'-P-CCNC: 19 U/L (ref 5–45)
BASOPHILS # BLD AUTO: 0.02 THOUSANDS/ΜL (ref 0–0.1)
BASOPHILS NFR BLD AUTO: 0 % (ref 0–1)
BILIRUB SERPL-MCNC: 1.36 MG/DL (ref 0.2–1)
BUN SERPL-MCNC: 15 MG/DL (ref 5–25)
CALCIUM SERPL-MCNC: 8.5 MG/DL (ref 8.3–10.1)
CHLORIDE SERPL-SCNC: 109 MMOL/L (ref 100–108)
CO2 SERPL-SCNC: 28 MMOL/L (ref 21–32)
CREAT SERPL-MCNC: 1.04 MG/DL (ref 0.6–1.3)
EOSINOPHIL # BLD AUTO: 0.22 THOUSAND/ΜL (ref 0–0.61)
EOSINOPHIL NFR BLD AUTO: 3 % (ref 0–6)
ERYTHROCYTE [DISTWIDTH] IN BLOOD BY AUTOMATED COUNT: 13 % (ref 11.6–15.1)
GFR SERPL CREATININE-BSD FRML MDRD: 78 ML/MIN/1.73SQ M
GLUCOSE SERPL-MCNC: 113 MG/DL (ref 65–140)
HCT VFR BLD AUTO: 42.4 % (ref 36.5–49.3)
HGB BLD-MCNC: 14.3 G/DL (ref 12–17)
IMM GRANULOCYTES # BLD AUTO: 0.03 THOUSAND/UL (ref 0–0.2)
IMM GRANULOCYTES NFR BLD AUTO: 0 % (ref 0–2)
LYMPHOCYTES # BLD AUTO: 1.62 THOUSANDS/ΜL (ref 0.6–4.47)
LYMPHOCYTES NFR BLD AUTO: 19 % (ref 14–44)
MCH RBC QN AUTO: 29.8 PG (ref 26.8–34.3)
MCHC RBC AUTO-ENTMCNC: 33.7 G/DL (ref 31.4–37.4)
MCV RBC AUTO: 88 FL (ref 82–98)
MONOCYTES # BLD AUTO: 0.6 THOUSAND/ΜL (ref 0.17–1.22)
MONOCYTES NFR BLD AUTO: 7 % (ref 4–12)
NEUTROPHILS # BLD AUTO: 6 THOUSANDS/ΜL (ref 1.85–7.62)
NEUTS SEG NFR BLD AUTO: 71 % (ref 43–75)
NRBC BLD AUTO-RTO: 0 /100 WBCS
PLATELET # BLD AUTO: 175 THOUSANDS/UL (ref 149–390)
PMV BLD AUTO: 9.8 FL (ref 8.9–12.7)
POTASSIUM SERPL-SCNC: 3.6 MMOL/L (ref 3.5–5.3)
PROT SERPL-MCNC: 6.7 G/DL (ref 6.4–8.2)
RBC # BLD AUTO: 4.8 MILLION/UL (ref 3.88–5.62)
SODIUM SERPL-SCNC: 140 MMOL/L (ref 136–145)
WBC # BLD AUTO: 8.49 THOUSAND/UL (ref 4.31–10.16)

## 2020-03-18 PROCEDURE — 92523 SPEECH SOUND LANG COMPREHEN: CPT

## 2020-03-18 PROCEDURE — 97163 PT EVAL HIGH COMPLEX 45 MIN: CPT | Performed by: PHYSICAL THERAPIST

## 2020-03-18 PROCEDURE — 85025 COMPLETE CBC W/AUTO DIFF WBC: CPT

## 2020-03-18 PROCEDURE — 80053 COMPREHEN METABOLIC PANEL: CPT

## 2020-03-18 PROCEDURE — 97129 THER IVNTJ 1ST 15 MIN: CPT

## 2020-03-18 PROCEDURE — 97530 THERAPEUTIC ACTIVITIES: CPT | Performed by: PHYSICAL THERAPIST

## 2020-03-18 PROCEDURE — 97166 OT EVAL MOD COMPLEX 45 MIN: CPT

## 2020-03-18 PROCEDURE — 97112 NEUROMUSCULAR REEDUCATION: CPT | Performed by: PHYSICAL THERAPIST

## 2020-03-18 PROCEDURE — 97130 THER IVNTJ EA ADDL 15 MIN: CPT

## 2020-03-18 PROCEDURE — 97535 SELF CARE MNGMENT TRAINING: CPT

## 2020-03-18 PROCEDURE — 99233 SBSQ HOSP IP/OBS HIGH 50: CPT

## 2020-03-18 RX ADMIN — APIXABAN 5 MG: 5 TABLET, FILM COATED ORAL at 08:19

## 2020-03-18 RX ADMIN — DOCUSATE SODIUM 100 MG: 100 CAPSULE, LIQUID FILLED ORAL at 08:19

## 2020-03-18 RX ADMIN — AMLODIPINE BESYLATE 5 MG: 5 TABLET ORAL at 08:19

## 2020-03-18 RX ADMIN — METOPROLOL SUCCINATE 150 MG: 100 TABLET, EXTENDED RELEASE ORAL at 08:19

## 2020-03-18 RX ADMIN — PANTOPRAZOLE SODIUM 40 MG: 40 TABLET, DELAYED RELEASE ORAL at 05:52

## 2020-03-18 RX ADMIN — MELATONIN 1000 UNITS: at 08:19

## 2020-03-18 RX ADMIN — SENNOSIDES 8.6 MG: 8.6 TABLET ORAL at 08:19

## 2020-03-18 RX ADMIN — ATORVASTATIN CALCIUM 40 MG: 40 TABLET, FILM COATED ORAL at 17:19

## 2020-03-18 RX ADMIN — APIXABAN 5 MG: 5 TABLET, FILM COATED ORAL at 17:19

## 2020-03-19 PROBLEM — R76.0 LUPUS ANTICOAGULANT POSITIVE: Status: ACTIVE | Noted: 2020-03-19

## 2020-03-19 PROCEDURE — 97530 THERAPEUTIC ACTIVITIES: CPT

## 2020-03-19 PROCEDURE — 97110 THERAPEUTIC EXERCISES: CPT

## 2020-03-19 PROCEDURE — 97535 SELF CARE MNGMENT TRAINING: CPT

## 2020-03-19 PROCEDURE — 97112 NEUROMUSCULAR REEDUCATION: CPT

## 2020-03-19 PROCEDURE — 97116 GAIT TRAINING THERAPY: CPT

## 2020-03-19 PROCEDURE — 99232 SBSQ HOSP IP/OBS MODERATE 35: CPT

## 2020-03-19 PROCEDURE — 97130 THER IVNTJ EA ADDL 15 MIN: CPT

## 2020-03-19 RX ADMIN — APIXABAN 5 MG: 5 TABLET, FILM COATED ORAL at 17:30

## 2020-03-19 RX ADMIN — APIXABAN 5 MG: 5 TABLET, FILM COATED ORAL at 10:25

## 2020-03-19 RX ADMIN — PANTOPRAZOLE SODIUM 40 MG: 40 TABLET, DELAYED RELEASE ORAL at 05:33

## 2020-03-19 RX ADMIN — ATORVASTATIN CALCIUM 40 MG: 40 TABLET, FILM COATED ORAL at 17:30

## 2020-03-19 RX ADMIN — ASPIRIN 81 MG: 81 TABLET ORAL at 10:25

## 2020-03-19 RX ADMIN — AMLODIPINE BESYLATE 5 MG: 5 TABLET ORAL at 10:24

## 2020-03-19 RX ADMIN — METOPROLOL SUCCINATE 150 MG: 100 TABLET, EXTENDED RELEASE ORAL at 10:25

## 2020-03-19 RX ADMIN — DOCUSATE SODIUM 100 MG: 100 CAPSULE, LIQUID FILLED ORAL at 17:30

## 2020-03-19 RX ADMIN — MELATONIN 1000 UNITS: at 10:25

## 2020-03-19 RX ADMIN — DOCUSATE SODIUM 100 MG: 100 CAPSULE, LIQUID FILLED ORAL at 10:24

## 2020-03-20 ENCOUNTER — TELEPHONE (OUTPATIENT)
Dept: NEUROLOGY | Facility: CLINIC | Age: 60
End: 2020-03-20

## 2020-03-20 LAB
F2 GENE MUT ANL BLD/T: NORMAL
F5 GENE MUT ANL BLD/T: NORMAL

## 2020-03-20 PROCEDURE — 99232 SBSQ HOSP IP/OBS MODERATE 35: CPT

## 2020-03-20 PROCEDURE — 97130 THER IVNTJ EA ADDL 15 MIN: CPT

## 2020-03-20 PROCEDURE — 97112 NEUROMUSCULAR REEDUCATION: CPT

## 2020-03-20 PROCEDURE — 97535 SELF CARE MNGMENT TRAINING: CPT

## 2020-03-20 RX ADMIN — ASPIRIN 81 MG: 81 TABLET ORAL at 09:35

## 2020-03-20 RX ADMIN — DOCUSATE SODIUM 100 MG: 100 CAPSULE, LIQUID FILLED ORAL at 17:24

## 2020-03-20 RX ADMIN — MELATONIN 1000 UNITS: at 09:36

## 2020-03-20 RX ADMIN — APIXABAN 5 MG: 5 TABLET, FILM COATED ORAL at 17:24

## 2020-03-20 RX ADMIN — AMLODIPINE BESYLATE 5 MG: 5 TABLET ORAL at 09:36

## 2020-03-20 RX ADMIN — METOPROLOL SUCCINATE 150 MG: 100 TABLET, EXTENDED RELEASE ORAL at 09:35

## 2020-03-20 RX ADMIN — APIXABAN 5 MG: 5 TABLET, FILM COATED ORAL at 09:35

## 2020-03-20 RX ADMIN — ATORVASTATIN CALCIUM 40 MG: 40 TABLET, FILM COATED ORAL at 17:23

## 2020-03-20 RX ADMIN — PANTOPRAZOLE SODIUM 40 MG: 40 TABLET, DELAYED RELEASE ORAL at 05:43

## 2020-03-20 RX ADMIN — SENNOSIDES 8.6 MG: 8.6 TABLET ORAL at 09:35

## 2020-03-20 RX ADMIN — DOCUSATE SODIUM 100 MG: 100 CAPSULE, LIQUID FILLED ORAL at 09:35

## 2020-03-20 NOTE — TELEPHONE ENCOUNTER
Bryan appt with neurovascular attending 6/5 at 12pm with Dr Dima Beth  Appt on hold  Patient currently admitted to [address] [place] [address], per inpatient notes estimated discharge home 2 weeks  Called mobile number on file with no answer  Left message on voicemail box for call back  Reviewed chart, sister Lizy Chang on communication consent form  Dot Tyson with no answer  Left message on voicemail box for call back

## 2020-03-20 NOTE — TELEPHONE ENCOUNTER
----- Message from Marcus Goodwin RN sent at 3/19/2020  8:01 AM EDT -----  Regarding: FW: HFU      ----- Message -----  From: Richie Daniel PA-C  Sent: 3/17/2020   7:46 AM EDT  To: Neurology [facility]  Subject: [place]                                              Reason for follow-up:  Stroke  -subspecialty for follow-up:  Vascular Neurology  -timeline of 8-10 weeks  -no established outpatient neurology provider  -prefer attending see due to complexity of case (stroke etiology not definitively clear)  -no outstanding labs/imaging/testing prior to follow-up  Thank you!

## 2020-03-20 NOTE — TELEPHONE ENCOUNTER
Received voicemail from sister asking to call her back at home phone  Called, explained nurse navigator role to her  Reviewed appt info  Answered all her questions  She does not have any further questions or concerns at this time

## 2020-03-20 NOTE — TELEPHONE ENCOUNTER
Patient returned my call, scheduled appt as below  Added to wait list as high priority for Dr Annette Lopez and Dr Selena Carpio in [facility]  Provided appt info  Mailed new patient packet to home address on file  Patient does not have any questions or concerns at this time  Received voicemail from sister at same time asking for call back on home number  States she will be home from work by 2 pm but may be available soon  Delores Rushing with no answer  Left voicemail stating I contacted Camilo Ferrara and my number if she has further questions or concerns

## 2020-03-21 PROCEDURE — 97110 THERAPEUTIC EXERCISES: CPT

## 2020-03-21 PROCEDURE — 97530 THERAPEUTIC ACTIVITIES: CPT

## 2020-03-21 PROCEDURE — 97116 GAIT TRAINING THERAPY: CPT

## 2020-03-21 PROCEDURE — 97535 SELF CARE MNGMENT TRAINING: CPT

## 2020-03-21 PROCEDURE — 97130 THER IVNTJ EA ADDL 15 MIN: CPT

## 2020-03-21 RX ADMIN — DOCUSATE SODIUM 100 MG: 100 CAPSULE, LIQUID FILLED ORAL at 09:26

## 2020-03-21 RX ADMIN — ASPIRIN 81 MG: 81 TABLET ORAL at 09:25

## 2020-03-21 RX ADMIN — AMLODIPINE BESYLATE 5 MG: 5 TABLET ORAL at 09:26

## 2020-03-21 RX ADMIN — SENNOSIDES 8.6 MG: 8.6 TABLET ORAL at 09:26

## 2020-03-21 RX ADMIN — DOCUSATE SODIUM 100 MG: 100 CAPSULE, LIQUID FILLED ORAL at 18:29

## 2020-03-21 RX ADMIN — APIXABAN 5 MG: 5 TABLET, FILM COATED ORAL at 09:26

## 2020-03-21 RX ADMIN — ATORVASTATIN CALCIUM 40 MG: 40 TABLET, FILM COATED ORAL at 16:26

## 2020-03-21 RX ADMIN — APIXABAN 5 MG: 5 TABLET, FILM COATED ORAL at 18:29

## 2020-03-21 RX ADMIN — PANTOPRAZOLE SODIUM 40 MG: 40 TABLET, DELAYED RELEASE ORAL at 05:24

## 2020-03-21 RX ADMIN — METOPROLOL SUCCINATE 150 MG: 100 TABLET, EXTENDED RELEASE ORAL at 09:25

## 2020-03-21 RX ADMIN — MELATONIN 1000 UNITS: at 09:26

## 2020-03-22 PROCEDURE — 97530 THERAPEUTIC ACTIVITIES: CPT

## 2020-03-22 PROCEDURE — 97112 NEUROMUSCULAR REEDUCATION: CPT

## 2020-03-22 PROCEDURE — 97535 SELF CARE MNGMENT TRAINING: CPT

## 2020-03-22 RX ADMIN — PANTOPRAZOLE SODIUM 40 MG: 40 TABLET, DELAYED RELEASE ORAL at 05:35

## 2020-03-22 RX ADMIN — MELATONIN 1000 UNITS: at 08:12

## 2020-03-22 RX ADMIN — ATORVASTATIN CALCIUM 40 MG: 40 TABLET, FILM COATED ORAL at 16:50

## 2020-03-22 RX ADMIN — ASPIRIN 81 MG: 81 TABLET ORAL at 08:11

## 2020-03-22 RX ADMIN — DOCUSATE SODIUM 100 MG: 100 CAPSULE, LIQUID FILLED ORAL at 08:12

## 2020-03-22 RX ADMIN — METOPROLOL SUCCINATE 150 MG: 100 TABLET, EXTENDED RELEASE ORAL at 08:12

## 2020-03-22 RX ADMIN — AMLODIPINE BESYLATE 5 MG: 5 TABLET ORAL at 08:12

## 2020-03-22 RX ADMIN — DOCUSATE SODIUM 100 MG: 100 CAPSULE, LIQUID FILLED ORAL at 18:40

## 2020-03-22 RX ADMIN — SENNOSIDES 8.6 MG: 8.6 TABLET ORAL at 08:12

## 2020-03-22 RX ADMIN — APIXABAN 5 MG: 5 TABLET, FILM COATED ORAL at 08:12

## 2020-03-22 RX ADMIN — APIXABAN 5 MG: 5 TABLET, FILM COATED ORAL at 18:40

## 2020-03-23 LAB
ANION GAP SERPL CALCULATED.3IONS-SCNC: 5 MMOL/L (ref 4–13)
BASOPHILS # BLD AUTO: 0.04 THOUSANDS/ΜL (ref 0–0.1)
BASOPHILS NFR BLD AUTO: 1 % (ref 0–1)
BUN SERPL-MCNC: 13 MG/DL (ref 5–25)
CALCIUM SERPL-MCNC: 8.5 MG/DL (ref 8.3–10.1)
CHLORIDE SERPL-SCNC: 110 MMOL/L (ref 100–108)
CO2 SERPL-SCNC: 27 MMOL/L (ref 21–32)
CREAT SERPL-MCNC: 0.89 MG/DL (ref 0.6–1.3)
EOSINOPHIL # BLD AUTO: 0.28 THOUSAND/ΜL (ref 0–0.61)
EOSINOPHIL NFR BLD AUTO: 3 % (ref 0–6)
ERYTHROCYTE [DISTWIDTH] IN BLOOD BY AUTOMATED COUNT: 13.1 % (ref 11.6–15.1)
GFR SERPL CREATININE-BSD FRML MDRD: 93 ML/MIN/1.73SQ M
GLUCOSE P FAST SERPL-MCNC: 112 MG/DL (ref 65–99)
GLUCOSE SERPL-MCNC: 112 MG/DL (ref 65–140)
HCT VFR BLD AUTO: 41 % (ref 36.5–49.3)
HGB BLD-MCNC: 13.9 G/DL (ref 12–17)
IMM GRANULOCYTES # BLD AUTO: 0.03 THOUSAND/UL (ref 0–0.2)
IMM GRANULOCYTES NFR BLD AUTO: 0 % (ref 0–2)
LYMPHOCYTES # BLD AUTO: 1.47 THOUSANDS/ΜL (ref 0.6–4.47)
LYMPHOCYTES NFR BLD AUTO: 18 % (ref 14–44)
MCH RBC QN AUTO: 29.9 PG (ref 26.8–34.3)
MCHC RBC AUTO-ENTMCNC: 33.9 G/DL (ref 31.4–37.4)
MCV RBC AUTO: 88 FL (ref 82–98)
MONOCYTES # BLD AUTO: 0.52 THOUSAND/ΜL (ref 0.17–1.22)
MONOCYTES NFR BLD AUTO: 6 % (ref 4–12)
NEUTROPHILS # BLD AUTO: 5.98 THOUSANDS/ΜL (ref 1.85–7.62)
NEUTS SEG NFR BLD AUTO: 72 % (ref 43–75)
NRBC BLD AUTO-RTO: 0 /100 WBCS
PLATELET # BLD AUTO: 168 THOUSANDS/UL (ref 149–390)
PMV BLD AUTO: 10.2 FL (ref 8.9–12.7)
POTASSIUM SERPL-SCNC: 3.7 MMOL/L (ref 3.5–5.3)
RBC # BLD AUTO: 4.65 MILLION/UL (ref 3.88–5.62)
SODIUM SERPL-SCNC: 142 MMOL/L (ref 136–145)
WBC # BLD AUTO: 8.32 THOUSAND/UL (ref 4.31–10.16)

## 2020-03-23 PROCEDURE — 97535 SELF CARE MNGMENT TRAINING: CPT

## 2020-03-23 PROCEDURE — 97112 NEUROMUSCULAR REEDUCATION: CPT

## 2020-03-23 PROCEDURE — 99232 SBSQ HOSP IP/OBS MODERATE 35: CPT | Performed by: PHYSICAL MEDICINE & REHABILITATION

## 2020-03-23 PROCEDURE — 97130 THER IVNTJ EA ADDL 15 MIN: CPT

## 2020-03-23 PROCEDURE — 97530 THERAPEUTIC ACTIVITIES: CPT

## 2020-03-23 PROCEDURE — 97129 THER IVNTJ 1ST 15 MIN: CPT

## 2020-03-23 PROCEDURE — 85025 COMPLETE CBC W/AUTO DIFF WBC: CPT | Performed by: NURSE PRACTITIONER

## 2020-03-23 PROCEDURE — 80048 BASIC METABOLIC PNL TOTAL CA: CPT | Performed by: NURSE PRACTITIONER

## 2020-03-23 RX ADMIN — APIXABAN 5 MG: 5 TABLET, FILM COATED ORAL at 17:55

## 2020-03-23 RX ADMIN — APIXABAN 5 MG: 5 TABLET, FILM COATED ORAL at 08:02

## 2020-03-23 RX ADMIN — ASPIRIN 81 MG: 81 TABLET ORAL at 08:02

## 2020-03-23 RX ADMIN — DOCUSATE SODIUM 100 MG: 100 CAPSULE, LIQUID FILLED ORAL at 08:02

## 2020-03-23 RX ADMIN — METOPROLOL SUCCINATE 150 MG: 100 TABLET, EXTENDED RELEASE ORAL at 08:02

## 2020-03-23 RX ADMIN — MELATONIN 1000 UNITS: at 08:02

## 2020-03-23 RX ADMIN — ATORVASTATIN CALCIUM 40 MG: 40 TABLET, FILM COATED ORAL at 17:55

## 2020-03-23 RX ADMIN — DOCUSATE SODIUM 100 MG: 100 CAPSULE, LIQUID FILLED ORAL at 17:55

## 2020-03-23 RX ADMIN — AMLODIPINE BESYLATE 5 MG: 5 TABLET ORAL at 08:02

## 2020-03-23 RX ADMIN — PANTOPRAZOLE SODIUM 40 MG: 40 TABLET, DELAYED RELEASE ORAL at 05:15

## 2020-03-23 RX ADMIN — SENNOSIDES 8.6 MG: 8.6 TABLET ORAL at 08:02

## 2020-03-24 PROCEDURE — 97116 GAIT TRAINING THERAPY: CPT | Performed by: PHYSICAL THERAPIST

## 2020-03-24 PROCEDURE — 97129 THER IVNTJ 1ST 15 MIN: CPT

## 2020-03-24 PROCEDURE — 97535 SELF CARE MNGMENT TRAINING: CPT

## 2020-03-24 PROCEDURE — 97112 NEUROMUSCULAR REEDUCATION: CPT | Performed by: PHYSICAL THERAPIST

## 2020-03-24 PROCEDURE — 97530 THERAPEUTIC ACTIVITIES: CPT | Performed by: PHYSICAL THERAPIST

## 2020-03-24 PROCEDURE — 97110 THERAPEUTIC EXERCISES: CPT | Performed by: PHYSICAL THERAPIST

## 2020-03-24 PROCEDURE — 97530 THERAPEUTIC ACTIVITIES: CPT

## 2020-03-24 PROCEDURE — 97112 NEUROMUSCULAR REEDUCATION: CPT

## 2020-03-24 PROCEDURE — 99232 SBSQ HOSP IP/OBS MODERATE 35: CPT | Performed by: PHYSICIAN ASSISTANT

## 2020-03-24 PROCEDURE — 97130 THER IVNTJ EA ADDL 15 MIN: CPT

## 2020-03-24 RX ADMIN — MELATONIN 1000 UNITS: at 08:14

## 2020-03-24 RX ADMIN — DOCUSATE SODIUM 100 MG: 100 CAPSULE, LIQUID FILLED ORAL at 08:14

## 2020-03-24 RX ADMIN — PANTOPRAZOLE SODIUM 40 MG: 40 TABLET, DELAYED RELEASE ORAL at 05:02

## 2020-03-24 RX ADMIN — APIXABAN 5 MG: 5 TABLET, FILM COATED ORAL at 17:20

## 2020-03-24 RX ADMIN — ASPIRIN 81 MG: 81 TABLET ORAL at 08:14

## 2020-03-24 RX ADMIN — SENNOSIDES 8.6 MG: 8.6 TABLET ORAL at 08:14

## 2020-03-24 RX ADMIN — DOCUSATE SODIUM 100 MG: 100 CAPSULE, LIQUID FILLED ORAL at 17:20

## 2020-03-24 RX ADMIN — APIXABAN 5 MG: 5 TABLET, FILM COATED ORAL at 08:14

## 2020-03-24 RX ADMIN — ATORVASTATIN CALCIUM 40 MG: 40 TABLET, FILM COATED ORAL at 17:20

## 2020-03-24 RX ADMIN — AMLODIPINE BESYLATE 5 MG: 5 TABLET ORAL at 08:14

## 2020-03-24 RX ADMIN — METOPROLOL SUCCINATE 150 MG: 100 TABLET, EXTENDED RELEASE ORAL at 08:14

## 2020-03-25 PROCEDURE — 99232 SBSQ HOSP IP/OBS MODERATE 35: CPT | Performed by: PHYSICAL MEDICINE & REHABILITATION

## 2020-03-25 PROCEDURE — 97530 THERAPEUTIC ACTIVITIES: CPT

## 2020-03-25 PROCEDURE — 97130 THER IVNTJ EA ADDL 15 MIN: CPT

## 2020-03-25 PROCEDURE — 97129 THER IVNTJ 1ST 15 MIN: CPT

## 2020-03-25 PROCEDURE — 97112 NEUROMUSCULAR REEDUCATION: CPT

## 2020-03-25 PROCEDURE — 97110 THERAPEUTIC EXERCISES: CPT

## 2020-03-25 PROCEDURE — 97535 SELF CARE MNGMENT TRAINING: CPT

## 2020-03-25 PROCEDURE — 97116 GAIT TRAINING THERAPY: CPT

## 2020-03-25 RX ADMIN — ASPIRIN 81 MG: 81 TABLET ORAL at 09:25

## 2020-03-25 RX ADMIN — ATORVASTATIN CALCIUM 40 MG: 40 TABLET, FILM COATED ORAL at 16:37

## 2020-03-25 RX ADMIN — APIXABAN 5 MG: 5 TABLET, FILM COATED ORAL at 17:31

## 2020-03-25 RX ADMIN — DOCUSATE SODIUM 100 MG: 100 CAPSULE, LIQUID FILLED ORAL at 17:31

## 2020-03-25 RX ADMIN — METOPROLOL SUCCINATE 150 MG: 100 TABLET, EXTENDED RELEASE ORAL at 09:25

## 2020-03-25 RX ADMIN — PANTOPRAZOLE SODIUM 40 MG: 40 TABLET, DELAYED RELEASE ORAL at 05:31

## 2020-03-25 RX ADMIN — AMLODIPINE BESYLATE 5 MG: 5 TABLET ORAL at 09:25

## 2020-03-25 RX ADMIN — APIXABAN 5 MG: 5 TABLET, FILM COATED ORAL at 09:25

## 2020-03-25 RX ADMIN — SENNOSIDES 8.6 MG: 8.6 TABLET ORAL at 09:25

## 2020-03-25 RX ADMIN — DOCUSATE SODIUM 100 MG: 100 CAPSULE, LIQUID FILLED ORAL at 09:25

## 2020-03-25 RX ADMIN — MELATONIN 1000 UNITS: at 09:25

## 2020-03-26 LAB
ANION GAP SERPL CALCULATED.3IONS-SCNC: 6 MMOL/L (ref 4–13)
BASOPHILS # BLD AUTO: 0.03 THOUSANDS/ΜL (ref 0–0.1)
BASOPHILS NFR BLD AUTO: 0 % (ref 0–1)
BUN SERPL-MCNC: 12 MG/DL (ref 5–25)
CALCIUM SERPL-MCNC: 8.6 MG/DL (ref 8.3–10.1)
CHLORIDE SERPL-SCNC: 107 MMOL/L (ref 100–108)
CO2 SERPL-SCNC: 27 MMOL/L (ref 21–32)
CREAT SERPL-MCNC: 1.04 MG/DL (ref 0.6–1.3)
EOSINOPHIL # BLD AUTO: 0.25 THOUSAND/ΜL (ref 0–0.61)
EOSINOPHIL NFR BLD AUTO: 3 % (ref 0–6)
ERYTHROCYTE [DISTWIDTH] IN BLOOD BY AUTOMATED COUNT: 13.2 % (ref 11.6–15.1)
GFR SERPL CREATININE-BSD FRML MDRD: 78 ML/MIN/1.73SQ M
GLUCOSE P FAST SERPL-MCNC: 107 MG/DL (ref 65–99)
GLUCOSE SERPL-MCNC: 107 MG/DL (ref 65–140)
HCT VFR BLD AUTO: 40.7 % (ref 36.5–49.3)
HGB BLD-MCNC: 13.9 G/DL (ref 12–17)
IMM GRANULOCYTES # BLD AUTO: 0.02 THOUSAND/UL (ref 0–0.2)
IMM GRANULOCYTES NFR BLD AUTO: 0 % (ref 0–2)
LYMPHOCYTES # BLD AUTO: 1.45 THOUSANDS/ΜL (ref 0.6–4.47)
LYMPHOCYTES NFR BLD AUTO: 19 % (ref 14–44)
MCH RBC QN AUTO: 30.2 PG (ref 26.8–34.3)
MCHC RBC AUTO-ENTMCNC: 34.2 G/DL (ref 31.4–37.4)
MCV RBC AUTO: 89 FL (ref 82–98)
MONOCYTES # BLD AUTO: 0.52 THOUSAND/ΜL (ref 0.17–1.22)
MONOCYTES NFR BLD AUTO: 7 % (ref 4–12)
NEUTROPHILS # BLD AUTO: 5.29 THOUSANDS/ΜL (ref 1.85–7.62)
NEUTS SEG NFR BLD AUTO: 71 % (ref 43–75)
NRBC BLD AUTO-RTO: 0 /100 WBCS
PLATELET # BLD AUTO: 169 THOUSANDS/UL (ref 149–390)
PMV BLD AUTO: 10.4 FL (ref 8.9–12.7)
POTASSIUM SERPL-SCNC: 3.5 MMOL/L (ref 3.5–5.3)
RBC # BLD AUTO: 4.6 MILLION/UL (ref 3.88–5.62)
SODIUM SERPL-SCNC: 140 MMOL/L (ref 136–145)
WBC # BLD AUTO: 7.56 THOUSAND/UL (ref 4.31–10.16)

## 2020-03-26 PROCEDURE — 80048 BASIC METABOLIC PNL TOTAL CA: CPT | Performed by: NURSE PRACTITIONER

## 2020-03-26 PROCEDURE — 97130 THER IVNTJ EA ADDL 15 MIN: CPT

## 2020-03-26 PROCEDURE — 97129 THER IVNTJ 1ST 15 MIN: CPT

## 2020-03-26 PROCEDURE — 97112 NEUROMUSCULAR REEDUCATION: CPT

## 2020-03-26 PROCEDURE — 85025 COMPLETE CBC W/AUTO DIFF WBC: CPT | Performed by: NURSE PRACTITIONER

## 2020-03-26 PROCEDURE — 97110 THERAPEUTIC EXERCISES: CPT

## 2020-03-26 PROCEDURE — 97530 THERAPEUTIC ACTIVITIES: CPT

## 2020-03-26 PROCEDURE — 99232 SBSQ HOSP IP/OBS MODERATE 35: CPT | Performed by: PHYSICAL MEDICINE & REHABILITATION

## 2020-03-26 PROCEDURE — 97535 SELF CARE MNGMENT TRAINING: CPT

## 2020-03-26 RX ORDER — ASPIRIN 81 MG/1
81 TABLET ORAL DAILY
Qty: 30 TABLET | Refills: 0 | Status: SHIPPED | OUTPATIENT
Start: 2020-03-27 | End: 2022-02-22

## 2020-03-26 RX ORDER — ATORVASTATIN CALCIUM 40 MG/1
40 TABLET, FILM COATED ORAL
Qty: 30 TABLET | Refills: 0 | Status: SHIPPED | OUTPATIENT
Start: 2020-03-26 | End: 2020-04-24 | Stop reason: SDUPTHER

## 2020-03-26 RX ORDER — AMLODIPINE BESYLATE 5 MG/1
5 TABLET ORAL DAILY
Qty: 30 TABLET | Refills: 0 | Status: SHIPPED | OUTPATIENT
Start: 2020-03-26 | End: 2020-06-17

## 2020-03-26 RX ORDER — PANTOPRAZOLE SODIUM 40 MG/1
40 TABLET, DELAYED RELEASE ORAL
Qty: 30 TABLET | Refills: 0 | Status: SHIPPED | OUTPATIENT
Start: 2020-03-27 | End: 2020-05-01

## 2020-03-26 RX ORDER — METOPROLOL SUCCINATE 50 MG/1
150 TABLET, EXTENDED RELEASE ORAL DAILY
Qty: 90 TABLET | Refills: 0 | Status: SHIPPED | OUTPATIENT
Start: 2020-03-27 | End: 2020-07-28

## 2020-03-26 RX ADMIN — APIXABAN 5 MG: 5 TABLET, FILM COATED ORAL at 08:13

## 2020-03-26 RX ADMIN — APIXABAN 5 MG: 5 TABLET, FILM COATED ORAL at 18:11

## 2020-03-26 RX ADMIN — PANTOPRAZOLE SODIUM 40 MG: 40 TABLET, DELAYED RELEASE ORAL at 05:44

## 2020-03-26 RX ADMIN — DOCUSATE SODIUM 100 MG: 100 CAPSULE, LIQUID FILLED ORAL at 08:13

## 2020-03-26 RX ADMIN — ATORVASTATIN CALCIUM 40 MG: 40 TABLET, FILM COATED ORAL at 16:32

## 2020-03-26 RX ADMIN — MELATONIN 1000 UNITS: at 08:13

## 2020-03-26 RX ADMIN — METOPROLOL SUCCINATE 150 MG: 100 TABLET, EXTENDED RELEASE ORAL at 08:13

## 2020-03-26 RX ADMIN — SENNOSIDES 8.6 MG: 8.6 TABLET ORAL at 08:13

## 2020-03-26 RX ADMIN — AMLODIPINE BESYLATE 5 MG: 5 TABLET ORAL at 08:13

## 2020-03-26 RX ADMIN — DOCUSATE SODIUM 100 MG: 100 CAPSULE, LIQUID FILLED ORAL at 18:11

## 2020-03-26 RX ADMIN — ASPIRIN 81 MG: 81 TABLET ORAL at 08:13

## 2020-03-27 ENCOUNTER — TRANSITIONAL CARE MANAGEMENT (OUTPATIENT)
Dept: FAMILY MEDICINE CLINIC | Facility: CLINIC | Age: 60
End: 2020-03-27

## 2020-03-27 VITALS
HEIGHT: 68 IN | HEART RATE: 62 BPM | TEMPERATURE: 97.9 F | WEIGHT: 247.14 LBS | RESPIRATION RATE: 20 BRPM | BODY MASS INDEX: 37.46 KG/M2 | OXYGEN SATURATION: 95 % | DIASTOLIC BLOOD PRESSURE: 88 MMHG | SYSTOLIC BLOOD PRESSURE: 154 MMHG

## 2020-03-27 PROCEDURE — 99239 HOSP IP/OBS DSCHRG MGMT >30: CPT | Performed by: PHYSICAL MEDICINE & REHABILITATION

## 2020-03-27 RX ADMIN — SENNOSIDES 8.6 MG: 8.6 TABLET ORAL at 09:16

## 2020-03-27 RX ADMIN — MELATONIN 1000 UNITS: at 09:16

## 2020-03-27 RX ADMIN — PANTOPRAZOLE SODIUM 40 MG: 40 TABLET, DELAYED RELEASE ORAL at 06:26

## 2020-03-27 RX ADMIN — METOPROLOL SUCCINATE 150 MG: 100 TABLET, EXTENDED RELEASE ORAL at 09:15

## 2020-03-27 RX ADMIN — DOCUSATE SODIUM 100 MG: 100 CAPSULE, LIQUID FILLED ORAL at 09:16

## 2020-03-27 RX ADMIN — AMLODIPINE BESYLATE 5 MG: 5 TABLET ORAL at 09:15

## 2020-03-27 RX ADMIN — ASPIRIN 81 MG: 81 TABLET ORAL at 09:15

## 2020-03-27 RX ADMIN — APIXABAN 5 MG: 5 TABLET, FILM COATED ORAL at 09:16

## 2020-03-30 DIAGNOSIS — E55.9 VITAMIN D DEFICIENCY: ICD-10-CM

## 2020-03-30 RX ORDER — MELATONIN
1000 DAILY
Qty: 30 TABLET | Refills: 5 | Status: SHIPPED | OUTPATIENT
Start: 2020-03-30 | End: 2022-05-03 | Stop reason: ALTCHOICE

## 2020-04-01 ENCOUNTER — TELEPHONE (OUTPATIENT)
Dept: NEUROLOGY | Facility: CLINIC | Age: 60
End: 2020-04-01

## 2020-04-02 ENCOUNTER — EVALUATION (OUTPATIENT)
Dept: PHYSICAL THERAPY | Facility: REHABILITATION | Age: 60
End: 2020-04-02
Payer: COMMERCIAL

## 2020-04-02 ENCOUNTER — EVALUATION (OUTPATIENT)
Dept: OCCUPATIONAL THERAPY | Facility: REHABILITATION | Age: 60
End: 2020-04-02
Payer: COMMERCIAL

## 2020-04-02 ENCOUNTER — EVALUATION (OUTPATIENT)
Dept: SPEECH THERAPY | Facility: REHABILITATION | Age: 60
End: 2020-04-02
Payer: COMMERCIAL

## 2020-04-02 DIAGNOSIS — I63.9 CEREBROVASCULAR ACCIDENT (CVA), UNSPECIFIED MECHANISM (HCC): ICD-10-CM

## 2020-04-02 DIAGNOSIS — G45.9 TIA (TRANSIENT ISCHEMIC ATTACK): ICD-10-CM

## 2020-04-02 DIAGNOSIS — I63.81 LACUNAR INFARCTION (HCC): ICD-10-CM

## 2020-04-02 DIAGNOSIS — I63.9 CEREBROVASCULAR ACCIDENT (CVA), UNSPECIFIED MECHANISM (HCC): Primary | ICD-10-CM

## 2020-04-02 DIAGNOSIS — I63.9 CVA (CEREBRAL VASCULAR ACCIDENT) (HCC): Primary | ICD-10-CM

## 2020-04-02 DIAGNOSIS — I63.522 ACUTE ISCHEMIC LEFT ANTERIOR CEREBRAL ARTERY (ACA) STROKE (HCC): ICD-10-CM

## 2020-04-02 DIAGNOSIS — I63.40 CEREBROVASCULAR ACCIDENT (CVA) DUE TO EMBOLISM OF CEREBRAL ARTERY (HCC): ICD-10-CM

## 2020-04-02 DIAGNOSIS — R48.8 OTHER SYMBOLIC DYSFUNCTIONS: Primary | ICD-10-CM

## 2020-04-02 PROCEDURE — 97162 PT EVAL MOD COMPLEX 30 MIN: CPT | Performed by: PHYSICAL THERAPIST

## 2020-04-02 PROCEDURE — 97166 OT EVAL MOD COMPLEX 45 MIN: CPT | Performed by: OCCUPATIONAL THERAPIST

## 2020-04-02 PROCEDURE — 96125 COGNITIVE TEST BY HC PRO: CPT

## 2020-04-03 ENCOUNTER — TELEPHONE (OUTPATIENT)
Dept: SURGICAL ONCOLOGY | Facility: CLINIC | Age: 60
End: 2020-04-03

## 2020-04-09 ENCOUNTER — OFFICE VISIT (OUTPATIENT)
Dept: SPEECH THERAPY | Facility: REHABILITATION | Age: 60
End: 2020-04-09
Payer: COMMERCIAL

## 2020-04-09 ENCOUNTER — OFFICE VISIT (OUTPATIENT)
Dept: PHYSICAL THERAPY | Facility: REHABILITATION | Age: 60
End: 2020-04-09
Payer: COMMERCIAL

## 2020-04-09 ENCOUNTER — OFFICE VISIT (OUTPATIENT)
Dept: OCCUPATIONAL THERAPY | Facility: REHABILITATION | Age: 60
End: 2020-04-09
Payer: COMMERCIAL

## 2020-04-09 ENCOUNTER — OFFICE VISIT (OUTPATIENT)
Dept: FAMILY MEDICINE CLINIC | Facility: CLINIC | Age: 60
End: 2020-04-09
Payer: COMMERCIAL

## 2020-04-09 VITALS
HEIGHT: 68 IN | SYSTOLIC BLOOD PRESSURE: 136 MMHG | WEIGHT: 248.2 LBS | BODY MASS INDEX: 37.62 KG/M2 | RESPIRATION RATE: 16 BRPM | DIASTOLIC BLOOD PRESSURE: 76 MMHG | HEART RATE: 64 BPM

## 2020-04-09 DIAGNOSIS — K08.9 DENTAL DISORDER: ICD-10-CM

## 2020-04-09 DIAGNOSIS — R76.0 LUPUS ANTICOAGULANT POSITIVE: ICD-10-CM

## 2020-04-09 DIAGNOSIS — G45.9 TIA (TRANSIENT ISCHEMIC ATTACK): ICD-10-CM

## 2020-04-09 DIAGNOSIS — I63.9 CEREBROVASCULAR ACCIDENT (CVA), UNSPECIFIED MECHANISM (HCC): Primary | ICD-10-CM

## 2020-04-09 DIAGNOSIS — I63.9 CEREBROVASCULAR ACCIDENT (CVA), UNSPECIFIED MECHANISM (HCC): ICD-10-CM

## 2020-04-09 DIAGNOSIS — R48.8 OTHER SYMBOLIC DYSFUNCTIONS: Primary | ICD-10-CM

## 2020-04-09 DIAGNOSIS — I63.81 LACUNAR INFARCTION (HCC): ICD-10-CM

## 2020-04-09 DIAGNOSIS — I48.0 PAF (PAROXYSMAL ATRIAL FIBRILLATION) (HCC): ICD-10-CM

## 2020-04-09 DIAGNOSIS — I10 ESSENTIAL HYPERTENSION: Chronic | ICD-10-CM

## 2020-04-09 DIAGNOSIS — I63.522 ACUTE ISCHEMIC LEFT ANTERIOR CEREBRAL ARTERY (ACA) STROKE (HCC): ICD-10-CM

## 2020-04-09 DIAGNOSIS — I63.522 ACUTE ISCHEMIC LEFT ANTERIOR CEREBRAL ARTERY (ACA) STROKE (HCC): Primary | ICD-10-CM

## 2020-04-09 DIAGNOSIS — H53.9 VISION CHANGES: ICD-10-CM

## 2020-04-09 PROBLEM — Z12.5 PROSTATE CANCER SCREENING: Status: RESOLVED | Noted: 2019-08-15 | Resolved: 2020-04-09

## 2020-04-09 PROCEDURE — 97110 THERAPEUTIC EXERCISES: CPT | Performed by: PHYSICAL THERAPIST

## 2020-04-09 PROCEDURE — 97112 NEUROMUSCULAR REEDUCATION: CPT | Performed by: OCCUPATIONAL THERAPIST

## 2020-04-09 PROCEDURE — 92507 TX SP LANG VOICE COMM INDIV: CPT

## 2020-04-09 PROCEDURE — 99495 TRANSJ CARE MGMT MOD F2F 14D: CPT | Performed by: FAMILY MEDICINE

## 2020-04-09 PROCEDURE — 97112 NEUROMUSCULAR REEDUCATION: CPT | Performed by: PHYSICAL THERAPIST

## 2020-04-09 PROCEDURE — 97110 THERAPEUTIC EXERCISES: CPT | Performed by: OCCUPATIONAL THERAPIST

## 2020-04-09 RX ORDER — METOPROLOL TARTRATE 50 MG/1
TABLET, FILM COATED ORAL
COMMUNITY
Start: 2020-03-02 | End: 2020-04-09

## 2020-04-10 ENCOUNTER — TELEPHONE (OUTPATIENT)
Dept: NEUROLOGY | Facility: CLINIC | Age: 60
End: 2020-04-10

## 2020-04-15 ENCOUNTER — OFFICE VISIT (OUTPATIENT)
Dept: SPEECH THERAPY | Facility: REHABILITATION | Age: 60
End: 2020-04-15
Payer: COMMERCIAL

## 2020-04-15 ENCOUNTER — OFFICE VISIT (OUTPATIENT)
Dept: PHYSICAL THERAPY | Facility: REHABILITATION | Age: 60
End: 2020-04-15
Payer: COMMERCIAL

## 2020-04-15 ENCOUNTER — TELEMEDICINE (OUTPATIENT)
Dept: NEUROLOGY | Facility: CLINIC | Age: 60
End: 2020-04-15
Payer: COMMERCIAL

## 2020-04-15 ENCOUNTER — OFFICE VISIT (OUTPATIENT)
Dept: OCCUPATIONAL THERAPY | Facility: REHABILITATION | Age: 60
End: 2020-04-15
Payer: COMMERCIAL

## 2020-04-15 DIAGNOSIS — I10 ESSENTIAL HYPERTENSION: ICD-10-CM

## 2020-04-15 DIAGNOSIS — I63.522 ACUTE ISCHEMIC LEFT ANTERIOR CEREBRAL ARTERY (ACA) STROKE (HCC): ICD-10-CM

## 2020-04-15 DIAGNOSIS — R48.8 OTHER SYMBOLIC DYSFUNCTIONS: Primary | ICD-10-CM

## 2020-04-15 DIAGNOSIS — I63.81 LACUNAR INFARCTION (HCC): ICD-10-CM

## 2020-04-15 DIAGNOSIS — E78.2 MIXED HYPERLIPIDEMIA: Chronic | ICD-10-CM

## 2020-04-15 DIAGNOSIS — I63.9 CEREBROVASCULAR ACCIDENT (CVA), UNSPECIFIED MECHANISM (HCC): Primary | ICD-10-CM

## 2020-04-15 DIAGNOSIS — Z86.73 HISTORY OF CVA (CEREBROVASCULAR ACCIDENT): Chronic | ICD-10-CM

## 2020-04-15 DIAGNOSIS — I48.0 PAROXYSMAL ATRIAL FIBRILLATION (HCC): ICD-10-CM

## 2020-04-15 DIAGNOSIS — G45.9 TIA (TRANSIENT ISCHEMIC ATTACK): ICD-10-CM

## 2020-04-15 DIAGNOSIS — I63.9 CEREBROVASCULAR ACCIDENT (CVA), UNSPECIFIED MECHANISM (HCC): ICD-10-CM

## 2020-04-15 DIAGNOSIS — Z86.73 HISTORY OF STROKE: Primary | ICD-10-CM

## 2020-04-15 DIAGNOSIS — Z79.01 ANTICOAGULATED BY ANTICOAGULATION TREATMENT: ICD-10-CM

## 2020-04-15 PROBLEM — I73.9 MICROANGIOPATHY (HCC): Status: RESOLVED | Noted: 2017-04-24 | Resolved: 2020-04-15

## 2020-04-15 PROCEDURE — 99214 OFFICE O/P EST MOD 30 MIN: CPT | Performed by: PSYCHIATRY & NEUROLOGY

## 2020-04-15 PROCEDURE — 97110 THERAPEUTIC EXERCISES: CPT | Performed by: PHYSICAL THERAPIST

## 2020-04-15 PROCEDURE — 97110 THERAPEUTIC EXERCISES: CPT | Performed by: OCCUPATIONAL THERAPIST

## 2020-04-15 PROCEDURE — 92507 TX SP LANG VOICE COMM INDIV: CPT

## 2020-04-15 PROCEDURE — 97112 NEUROMUSCULAR REEDUCATION: CPT | Performed by: OCCUPATIONAL THERAPIST

## 2020-04-15 PROCEDURE — 97140 MANUAL THERAPY 1/> REGIONS: CPT | Performed by: OCCUPATIONAL THERAPIST

## 2020-04-15 PROCEDURE — 97112 NEUROMUSCULAR REEDUCATION: CPT | Performed by: PHYSICAL THERAPIST

## 2020-04-22 ENCOUNTER — OFFICE VISIT (OUTPATIENT)
Dept: SPEECH THERAPY | Facility: REHABILITATION | Age: 60
End: 2020-04-22
Payer: COMMERCIAL

## 2020-04-22 ENCOUNTER — OFFICE VISIT (OUTPATIENT)
Dept: PHYSICAL THERAPY | Facility: REHABILITATION | Age: 60
End: 2020-04-22
Payer: COMMERCIAL

## 2020-04-22 ENCOUNTER — OFFICE VISIT (OUTPATIENT)
Dept: OCCUPATIONAL THERAPY | Facility: REHABILITATION | Age: 60
End: 2020-04-22
Payer: COMMERCIAL

## 2020-04-22 DIAGNOSIS — I63.9 CEREBROVASCULAR ACCIDENT (CVA), UNSPECIFIED MECHANISM (HCC): Primary | ICD-10-CM

## 2020-04-22 DIAGNOSIS — I63.522 ACUTE ISCHEMIC LEFT ANTERIOR CEREBRAL ARTERY (ACA) STROKE (HCC): ICD-10-CM

## 2020-04-22 DIAGNOSIS — R48.8 OTHER SYMBOLIC DYSFUNCTIONS: Primary | ICD-10-CM

## 2020-04-22 DIAGNOSIS — I63.81 LACUNAR INFARCTION (HCC): ICD-10-CM

## 2020-04-22 DIAGNOSIS — G45.9 TIA (TRANSIENT ISCHEMIC ATTACK): ICD-10-CM

## 2020-04-22 DIAGNOSIS — I63.9 CEREBROVASCULAR ACCIDENT (CVA), UNSPECIFIED MECHANISM (HCC): ICD-10-CM

## 2020-04-22 PROCEDURE — 92507 TX SP LANG VOICE COMM INDIV: CPT

## 2020-04-22 PROCEDURE — 97112 NEUROMUSCULAR REEDUCATION: CPT | Performed by: OCCUPATIONAL THERAPIST

## 2020-04-22 PROCEDURE — 97140 MANUAL THERAPY 1/> REGIONS: CPT | Performed by: OCCUPATIONAL THERAPIST

## 2020-04-22 PROCEDURE — 97110 THERAPEUTIC EXERCISES: CPT | Performed by: OCCUPATIONAL THERAPIST

## 2020-04-22 PROCEDURE — 97112 NEUROMUSCULAR REEDUCATION: CPT | Performed by: PHYSICAL THERAPIST

## 2020-04-24 DIAGNOSIS — E78.5 HYPERLIPIDEMIA: Chronic | ICD-10-CM

## 2020-04-25 RX ORDER — ATORVASTATIN CALCIUM 40 MG/1
40 TABLET, FILM COATED ORAL
Qty: 30 TABLET | Refills: 5 | Status: SHIPPED | OUTPATIENT
Start: 2020-04-25 | End: 2020-09-16 | Stop reason: SDUPTHER

## 2020-04-30 ENCOUNTER — EVALUATION (OUTPATIENT)
Dept: SPEECH THERAPY | Facility: REHABILITATION | Age: 60
End: 2020-04-30
Payer: COMMERCIAL

## 2020-04-30 ENCOUNTER — OFFICE VISIT (OUTPATIENT)
Dept: PHYSICAL THERAPY | Facility: REHABILITATION | Age: 60
End: 2020-04-30
Payer: COMMERCIAL

## 2020-04-30 ENCOUNTER — OFFICE VISIT (OUTPATIENT)
Dept: OCCUPATIONAL THERAPY | Facility: REHABILITATION | Age: 60
End: 2020-04-30
Payer: COMMERCIAL

## 2020-04-30 DIAGNOSIS — I63.9 CEREBROVASCULAR ACCIDENT (CVA), UNSPECIFIED MECHANISM (HCC): Primary | ICD-10-CM

## 2020-04-30 DIAGNOSIS — I63.9 CEREBROVASCULAR ACCIDENT (CVA), UNSPECIFIED MECHANISM (HCC): ICD-10-CM

## 2020-04-30 DIAGNOSIS — G81.91 RIGHT HEMIPARESIS (HCC): ICD-10-CM

## 2020-04-30 DIAGNOSIS — R48.8 OTHER SYMBOLIC DYSFUNCTIONS: Primary | ICD-10-CM

## 2020-04-30 DIAGNOSIS — G45.9 TIA (TRANSIENT ISCHEMIC ATTACK): ICD-10-CM

## 2020-04-30 DIAGNOSIS — I63.522 ACUTE ISCHEMIC LEFT ANTERIOR CEREBRAL ARTERY (ACA) STROKE (HCC): ICD-10-CM

## 2020-04-30 DIAGNOSIS — I63.81 LACUNAR INFARCTION (HCC): ICD-10-CM

## 2020-04-30 PROCEDURE — 97140 MANUAL THERAPY 1/> REGIONS: CPT | Performed by: OCCUPATIONAL THERAPIST

## 2020-04-30 PROCEDURE — 97110 THERAPEUTIC EXERCISES: CPT | Performed by: OCCUPATIONAL THERAPIST

## 2020-04-30 PROCEDURE — 96125 COGNITIVE TEST BY HC PRO: CPT

## 2020-04-30 PROCEDURE — 97112 NEUROMUSCULAR REEDUCATION: CPT

## 2020-04-30 PROCEDURE — 97112 NEUROMUSCULAR REEDUCATION: CPT | Performed by: OCCUPATIONAL THERAPIST

## 2020-05-01 DIAGNOSIS — K21.9 GERD (GASTROESOPHAGEAL REFLUX DISEASE): ICD-10-CM

## 2020-05-01 RX ORDER — PANTOPRAZOLE SODIUM 40 MG/1
TABLET, DELAYED RELEASE ORAL
Qty: 90 TABLET | Refills: 0 | Status: SHIPPED | OUTPATIENT
Start: 2020-05-01 | End: 2020-07-27

## 2020-05-04 DIAGNOSIS — I48.0 PAROXYSMAL ATRIAL FIBRILLATION (HCC): ICD-10-CM

## 2020-05-04 RX ORDER — RIVAROXABAN 20 MG/1
TABLET, FILM COATED ORAL
Qty: 90 TABLET | Refills: 1 | OUTPATIENT
Start: 2020-05-04

## 2020-05-07 ENCOUNTER — OFFICE VISIT (OUTPATIENT)
Dept: OCCUPATIONAL THERAPY | Facility: REHABILITATION | Age: 60
End: 2020-05-07
Payer: COMMERCIAL

## 2020-05-07 ENCOUNTER — OFFICE VISIT (OUTPATIENT)
Dept: PHYSICAL THERAPY | Facility: REHABILITATION | Age: 60
End: 2020-05-07
Payer: COMMERCIAL

## 2020-05-07 DIAGNOSIS — G81.91 RIGHT HEMIPARESIS (HCC): ICD-10-CM

## 2020-05-07 DIAGNOSIS — I63.9 CEREBROVASCULAR ACCIDENT (CVA), UNSPECIFIED MECHANISM (HCC): Primary | ICD-10-CM

## 2020-05-07 PROCEDURE — 97140 MANUAL THERAPY 1/> REGIONS: CPT | Performed by: OCCUPATIONAL THERAPIST

## 2020-05-07 PROCEDURE — 97112 NEUROMUSCULAR REEDUCATION: CPT

## 2020-05-07 PROCEDURE — 97112 NEUROMUSCULAR REEDUCATION: CPT | Performed by: OCCUPATIONAL THERAPIST

## 2020-05-14 ENCOUNTER — OFFICE VISIT (OUTPATIENT)
Dept: PHYSICAL THERAPY | Facility: REHABILITATION | Age: 60
End: 2020-05-14
Payer: COMMERCIAL

## 2020-05-14 ENCOUNTER — OFFICE VISIT (OUTPATIENT)
Dept: OCCUPATIONAL THERAPY | Facility: REHABILITATION | Age: 60
End: 2020-05-14
Payer: COMMERCIAL

## 2020-05-14 DIAGNOSIS — I63.9 CEREBROVASCULAR ACCIDENT (CVA), UNSPECIFIED MECHANISM (HCC): Primary | ICD-10-CM

## 2020-05-14 DIAGNOSIS — G81.91 RIGHT HEMIPARESIS (HCC): ICD-10-CM

## 2020-05-14 PROCEDURE — 97112 NEUROMUSCULAR REEDUCATION: CPT | Performed by: OCCUPATIONAL THERAPIST

## 2020-05-14 PROCEDURE — 97112 NEUROMUSCULAR REEDUCATION: CPT

## 2020-05-14 PROCEDURE — 97110 THERAPEUTIC EXERCISES: CPT | Performed by: OCCUPATIONAL THERAPIST

## 2020-05-14 PROCEDURE — 97140 MANUAL THERAPY 1/> REGIONS: CPT | Performed by: OCCUPATIONAL THERAPIST

## 2020-05-21 ENCOUNTER — OFFICE VISIT (OUTPATIENT)
Dept: OCCUPATIONAL THERAPY | Facility: REHABILITATION | Age: 60
End: 2020-05-21
Payer: COMMERCIAL

## 2020-05-21 ENCOUNTER — OFFICE VISIT (OUTPATIENT)
Dept: PHYSICAL THERAPY | Facility: REHABILITATION | Age: 60
End: 2020-05-21
Payer: COMMERCIAL

## 2020-05-21 DIAGNOSIS — I63.9 CEREBROVASCULAR ACCIDENT (CVA), UNSPECIFIED MECHANISM (HCC): Primary | ICD-10-CM

## 2020-05-21 DIAGNOSIS — G81.91 RIGHT HEMIPARESIS (HCC): ICD-10-CM

## 2020-05-21 PROCEDURE — 97112 NEUROMUSCULAR REEDUCATION: CPT

## 2020-05-21 PROCEDURE — 97110 THERAPEUTIC EXERCISES: CPT | Performed by: OCCUPATIONAL THERAPIST

## 2020-05-21 PROCEDURE — 97112 NEUROMUSCULAR REEDUCATION: CPT | Performed by: OCCUPATIONAL THERAPIST

## 2020-05-21 PROCEDURE — 97140 MANUAL THERAPY 1/> REGIONS: CPT | Performed by: OCCUPATIONAL THERAPIST

## 2020-05-26 ENCOUNTER — TELEPHONE (OUTPATIENT)
Dept: FAMILY MEDICINE CLINIC | Facility: CLINIC | Age: 60
End: 2020-05-26

## 2020-05-26 DIAGNOSIS — I48.91 A-FIB (HCC): ICD-10-CM

## 2020-05-26 DIAGNOSIS — R76.0 LUPUS ANTICOAGULANT POSITIVE: ICD-10-CM

## 2020-05-26 DIAGNOSIS — I63.40 CEREBROVASCULAR ACCIDENT (CVA) DUE TO EMBOLISM OF CEREBRAL ARTERY (HCC): ICD-10-CM

## 2020-05-28 ENCOUNTER — OFFICE VISIT (OUTPATIENT)
Dept: OCCUPATIONAL THERAPY | Facility: REHABILITATION | Age: 60
End: 2020-05-28
Payer: COMMERCIAL

## 2020-05-28 ENCOUNTER — EVALUATION (OUTPATIENT)
Dept: PHYSICAL THERAPY | Facility: REHABILITATION | Age: 60
End: 2020-05-28
Payer: COMMERCIAL

## 2020-05-28 DIAGNOSIS — G81.91 RIGHT HEMIPARESIS (HCC): ICD-10-CM

## 2020-05-28 DIAGNOSIS — I63.9 CEREBROVASCULAR ACCIDENT (CVA), UNSPECIFIED MECHANISM (HCC): Primary | ICD-10-CM

## 2020-05-28 PROCEDURE — 97110 THERAPEUTIC EXERCISES: CPT

## 2020-05-28 PROCEDURE — 97112 NEUROMUSCULAR REEDUCATION: CPT | Performed by: OCCUPATIONAL THERAPIST

## 2020-05-28 PROCEDURE — 97110 THERAPEUTIC EXERCISES: CPT | Performed by: OCCUPATIONAL THERAPIST

## 2020-05-28 PROCEDURE — 97140 MANUAL THERAPY 1/> REGIONS: CPT | Performed by: OCCUPATIONAL THERAPIST

## 2020-05-28 PROCEDURE — 97112 NEUROMUSCULAR REEDUCATION: CPT

## 2020-06-03 ENCOUNTER — CONSULT (OUTPATIENT)
Dept: HEMATOLOGY ONCOLOGY | Facility: CLINIC | Age: 60
End: 2020-06-03
Payer: COMMERCIAL

## 2020-06-03 VITALS
WEIGHT: 242 LBS | HEIGHT: 68 IN | SYSTOLIC BLOOD PRESSURE: 142 MMHG | DIASTOLIC BLOOD PRESSURE: 82 MMHG | RESPIRATION RATE: 18 BRPM | BODY MASS INDEX: 36.68 KG/M2 | TEMPERATURE: 96.7 F | HEART RATE: 61 BPM | OXYGEN SATURATION: 98 %

## 2020-06-03 DIAGNOSIS — D68.61 ANTIPHOSPHOLIPID ANTIBODY WITH HYPERCOAGULABLE STATE (HCC): Primary | ICD-10-CM

## 2020-06-03 PROCEDURE — 99205 OFFICE O/P NEW HI 60 MIN: CPT | Performed by: INTERNAL MEDICINE

## 2020-06-04 ENCOUNTER — OFFICE VISIT (OUTPATIENT)
Dept: PHYSICAL THERAPY | Facility: REHABILITATION | Age: 60
End: 2020-06-04
Payer: COMMERCIAL

## 2020-06-04 ENCOUNTER — OFFICE VISIT (OUTPATIENT)
Dept: OCCUPATIONAL THERAPY | Facility: REHABILITATION | Age: 60
End: 2020-06-04
Payer: COMMERCIAL

## 2020-06-04 DIAGNOSIS — I63.9 CEREBROVASCULAR ACCIDENT (CVA), UNSPECIFIED MECHANISM (HCC): Primary | ICD-10-CM

## 2020-06-04 DIAGNOSIS — G81.91 RIGHT HEMIPARESIS (HCC): ICD-10-CM

## 2020-06-04 PROCEDURE — 97110 THERAPEUTIC EXERCISES: CPT | Performed by: OCCUPATIONAL THERAPIST

## 2020-06-04 PROCEDURE — 97112 NEUROMUSCULAR REEDUCATION: CPT

## 2020-06-04 PROCEDURE — 97110 THERAPEUTIC EXERCISES: CPT

## 2020-06-04 PROCEDURE — 97140 MANUAL THERAPY 1/> REGIONS: CPT | Performed by: OCCUPATIONAL THERAPIST

## 2020-06-11 ENCOUNTER — APPOINTMENT (OUTPATIENT)
Dept: PHYSICAL THERAPY | Facility: REHABILITATION | Age: 60
End: 2020-06-11
Payer: COMMERCIAL

## 2020-06-11 ENCOUNTER — EVALUATION (OUTPATIENT)
Dept: PHYSICAL THERAPY | Facility: REHABILITATION | Age: 60
End: 2020-06-11
Payer: COMMERCIAL

## 2020-06-11 ENCOUNTER — EVALUATION (OUTPATIENT)
Dept: OCCUPATIONAL THERAPY | Facility: REHABILITATION | Age: 60
End: 2020-06-11
Payer: COMMERCIAL

## 2020-06-11 DIAGNOSIS — I63.9 CEREBROVASCULAR ACCIDENT (CVA), UNSPECIFIED MECHANISM (HCC): Primary | ICD-10-CM

## 2020-06-11 DIAGNOSIS — G81.91 RIGHT HEMIPARESIS (HCC): ICD-10-CM

## 2020-06-11 PROCEDURE — 97110 THERAPEUTIC EXERCISES: CPT | Performed by: OCCUPATIONAL THERAPIST

## 2020-06-11 PROCEDURE — 97140 MANUAL THERAPY 1/> REGIONS: CPT | Performed by: OCCUPATIONAL THERAPIST

## 2020-06-11 PROCEDURE — 97112 NEUROMUSCULAR REEDUCATION: CPT | Performed by: OCCUPATIONAL THERAPIST

## 2020-06-11 PROCEDURE — 97112 NEUROMUSCULAR REEDUCATION: CPT

## 2020-06-15 DIAGNOSIS — I10 ESSENTIAL HYPERTENSION: Chronic | ICD-10-CM

## 2020-06-17 RX ORDER — AMLODIPINE BESYLATE 5 MG/1
TABLET ORAL
Qty: 90 TABLET | Refills: 0 | Status: SHIPPED | OUTPATIENT
Start: 2020-06-17 | End: 2020-09-16 | Stop reason: SDUPTHER

## 2020-06-24 ENCOUNTER — OFFICE VISIT (OUTPATIENT)
Dept: CARDIOLOGY CLINIC | Facility: CLINIC | Age: 60
End: 2020-06-24
Payer: COMMERCIAL

## 2020-06-24 VITALS
SYSTOLIC BLOOD PRESSURE: 128 MMHG | TEMPERATURE: 98.3 F | OXYGEN SATURATION: 98 % | HEART RATE: 56 BPM | HEIGHT: 68 IN | BODY MASS INDEX: 36.56 KG/M2 | DIASTOLIC BLOOD PRESSURE: 60 MMHG | WEIGHT: 241.2 LBS

## 2020-06-24 DIAGNOSIS — I63.522 ACUTE ISCHEMIC LEFT ANTERIOR CEREBRAL ARTERY (ACA) STROKE (HCC): ICD-10-CM

## 2020-06-24 DIAGNOSIS — R76.0 LUPUS ANTICOAGULANT POSITIVE: ICD-10-CM

## 2020-06-24 PROCEDURE — 1036F TOBACCO NON-USER: CPT | Performed by: INTERNAL MEDICINE

## 2020-06-24 PROCEDURE — 99214 OFFICE O/P EST MOD 30 MIN: CPT | Performed by: INTERNAL MEDICINE

## 2020-06-24 PROCEDURE — 3074F SYST BP LT 130 MM HG: CPT | Performed by: INTERNAL MEDICINE

## 2020-06-24 PROCEDURE — 3078F DIAST BP <80 MM HG: CPT | Performed by: INTERNAL MEDICINE

## 2020-06-24 PROCEDURE — 3008F BODY MASS INDEX DOCD: CPT | Performed by: INTERNAL MEDICINE

## 2020-06-25 ENCOUNTER — OFFICE VISIT (OUTPATIENT)
Dept: FAMILY MEDICINE CLINIC | Facility: CLINIC | Age: 60
End: 2020-06-25
Payer: COMMERCIAL

## 2020-06-25 VITALS
DIASTOLIC BLOOD PRESSURE: 80 MMHG | TEMPERATURE: 97.9 F | WEIGHT: 241 LBS | HEIGHT: 68 IN | SYSTOLIC BLOOD PRESSURE: 128 MMHG | BODY MASS INDEX: 36.53 KG/M2 | HEART RATE: 76 BPM

## 2020-06-25 DIAGNOSIS — I10 ESSENTIAL HYPERTENSION: ICD-10-CM

## 2020-06-25 DIAGNOSIS — E66.01 CLASS 2 SEVERE OBESITY DUE TO EXCESS CALORIES WITH SERIOUS COMORBIDITY AND BODY MASS INDEX (BMI) OF 36.0 TO 36.9 IN ADULT (HCC): ICD-10-CM

## 2020-06-25 DIAGNOSIS — Z79.01 ANTICOAGULATED BY ANTICOAGULATION TREATMENT: ICD-10-CM

## 2020-06-25 DIAGNOSIS — I35.0 NONRHEUMATIC AORTIC VALVE STENOSIS: ICD-10-CM

## 2020-06-25 DIAGNOSIS — I48.0 PAROXYSMAL ATRIAL FIBRILLATION (HCC): ICD-10-CM

## 2020-06-25 DIAGNOSIS — I48.91 A-FIB (HCC): ICD-10-CM

## 2020-06-25 DIAGNOSIS — Z98.890 H/O AORTIC ANEURYSM REPAIR: Chronic | ICD-10-CM

## 2020-06-25 DIAGNOSIS — Z86.79 H/O AORTIC ANEURYSM REPAIR: Chronic | ICD-10-CM

## 2020-06-25 DIAGNOSIS — K21.9 GASTROESOPHAGEAL REFLUX DISEASE WITHOUT ESOPHAGITIS: ICD-10-CM

## 2020-06-25 DIAGNOSIS — D68.61 ANTIPHOSPHOLIPID ANTIBODY WITH HYPERCOAGULABLE STATE (HCC): Primary | ICD-10-CM

## 2020-06-25 DIAGNOSIS — I63.40 CEREBROVASCULAR ACCIDENT (CVA) DUE TO EMBOLISM OF CEREBRAL ARTERY (HCC): ICD-10-CM

## 2020-06-25 DIAGNOSIS — E78.2 MIXED HYPERLIPIDEMIA: Chronic | ICD-10-CM

## 2020-06-25 DIAGNOSIS — Z86.73 HISTORY OF STROKE: ICD-10-CM

## 2020-06-25 PROCEDURE — 3079F DIAST BP 80-89 MM HG: CPT | Performed by: FAMILY MEDICINE

## 2020-06-25 PROCEDURE — 3074F SYST BP LT 130 MM HG: CPT | Performed by: FAMILY MEDICINE

## 2020-06-25 PROCEDURE — 99215 OFFICE O/P EST HI 40 MIN: CPT | Performed by: FAMILY MEDICINE

## 2020-06-25 PROCEDURE — 1036F TOBACCO NON-USER: CPT | Performed by: FAMILY MEDICINE

## 2020-06-25 PROCEDURE — 3008F BODY MASS INDEX DOCD: CPT | Performed by: FAMILY MEDICINE

## 2020-06-25 RX ORDER — FAMOTIDINE 20 MG/1
20 TABLET, FILM COATED ORAL DAILY
Qty: 30 TABLET | Refills: 2 | Status: SHIPPED | OUTPATIENT
Start: 2020-06-25 | End: 2020-08-18

## 2020-07-01 ENCOUNTER — REMOTE DEVICE CLINIC VISIT (OUTPATIENT)
Dept: CARDIOLOGY CLINIC | Facility: CLINIC | Age: 60
End: 2020-07-01
Payer: COMMERCIAL

## 2020-07-01 DIAGNOSIS — Z95.818 PRESENCE OF OTHER CARDIAC IMPLANTS AND GRAFTS: Primary | ICD-10-CM

## 2020-07-01 PROCEDURE — 93298 REM INTERROG DEV EVAL SCRMS: CPT | Performed by: INTERNAL MEDICINE

## 2020-07-01 PROCEDURE — G2066 INTER DEVC REMOTE 30D: HCPCS | Performed by: INTERNAL MEDICINE

## 2020-07-01 NOTE — PROGRESS NOTES
MDT LOOP    CARELINK TRANSMISSION: LOOP RECORDER  PRESENTING RHYTHM SB @ 51 BPM  BATTERY STATUS "OK"  5 AF EPISODES W/ EGRAMS SUGGESTING SR W/ PACs AND PVCs [ Cathclaudyn Pinch  CAN NOT R/O AF  AF BURDEN = 0%  HX OF PAF  PT TAKES ELIQUIS AND ASA 81  NO PATIENT ACTIVATED EPISODES  NORMAL DEVICE FUNCTION   DL

## 2020-07-09 DIAGNOSIS — Z12.5 PROSTATE CANCER SCREENING: Primary | ICD-10-CM

## 2020-07-09 DIAGNOSIS — N20.0 KIDNEY STONES: ICD-10-CM

## 2020-07-18 DIAGNOSIS — I10 HTN (HYPERTENSION), BENIGN: ICD-10-CM

## 2020-07-20 RX ORDER — METOPROLOL TARTRATE 50 MG/1
TABLET, FILM COATED ORAL
Qty: 180 TABLET | Refills: 1 | OUTPATIENT
Start: 2020-07-20

## 2020-07-23 ENCOUNTER — APPOINTMENT (OUTPATIENT)
Dept: LAB | Facility: HOSPITAL | Age: 60
End: 2020-07-23
Payer: COMMERCIAL

## 2020-07-23 DIAGNOSIS — I63.40 CEREBROVASCULAR ACCIDENT (CVA) DUE TO EMBOLISM OF CEREBRAL ARTERY (HCC): ICD-10-CM

## 2020-07-23 DIAGNOSIS — I63.81 LACUNAR INFARCTION (HCC): ICD-10-CM

## 2020-07-23 DIAGNOSIS — E78.2 MIXED HYPERLIPIDEMIA: ICD-10-CM

## 2020-07-23 DIAGNOSIS — E55.9 VITAMIN D DEFICIENCY: ICD-10-CM

## 2020-07-23 LAB
25(OH)D3 SERPL-MCNC: 32.6 NG/ML (ref 30–100)
ALBUMIN SERPL BCP-MCNC: 3.6 G/DL (ref 3.5–5)
ALP SERPL-CCNC: 158 U/L (ref 46–116)
ALT SERPL W P-5'-P-CCNC: 38 U/L (ref 12–78)
ANION GAP SERPL CALCULATED.3IONS-SCNC: 12 MMOL/L (ref 4–13)
AST SERPL W P-5'-P-CCNC: 23 U/L (ref 5–45)
BILIRUB SERPL-MCNC: 1.79 MG/DL (ref 0.2–1)
BUN SERPL-MCNC: 13 MG/DL (ref 5–25)
CALCIUM SERPL-MCNC: 8.6 MG/DL (ref 8.3–10.1)
CHLORIDE SERPL-SCNC: 103 MMOL/L (ref 100–108)
CHOLEST SERPL-MCNC: 101 MG/DL (ref 50–200)
CO2 SERPL-SCNC: 24 MMOL/L (ref 21–32)
CREAT SERPL-MCNC: 1.09 MG/DL (ref 0.6–1.3)
GFR SERPL CREATININE-BSD FRML MDRD: 73 ML/MIN/1.73SQ M
GLUCOSE P FAST SERPL-MCNC: 106 MG/DL (ref 65–99)
HDLC SERPL-MCNC: 35 MG/DL
LDLC SERPL CALC-MCNC: 54 MG/DL (ref 0–100)
NONHDLC SERPL-MCNC: 66 MG/DL
POTASSIUM SERPL-SCNC: 3.9 MMOL/L (ref 3.5–5.3)
PROT SERPL-MCNC: 7.1 G/DL (ref 6.4–8.2)
SODIUM SERPL-SCNC: 139 MMOL/L (ref 136–145)
TRIGL SERPL-MCNC: 58 MG/DL

## 2020-07-23 PROCEDURE — 36415 COLL VENOUS BLD VENIPUNCTURE: CPT

## 2020-07-23 PROCEDURE — 80053 COMPREHEN METABOLIC PANEL: CPT

## 2020-07-23 PROCEDURE — 82306 VITAMIN D 25 HYDROXY: CPT

## 2020-07-23 PROCEDURE — 80061 LIPID PANEL: CPT

## 2020-07-25 DIAGNOSIS — K21.9 GERD (GASTROESOPHAGEAL REFLUX DISEASE): ICD-10-CM

## 2020-07-27 RX ORDER — PANTOPRAZOLE SODIUM 40 MG/1
TABLET, DELAYED RELEASE ORAL
Qty: 90 TABLET | Refills: 0 | Status: SHIPPED | OUTPATIENT
Start: 2020-07-27 | End: 2020-09-16 | Stop reason: SDUPTHER

## 2020-07-28 DIAGNOSIS — I10 ESSENTIAL HYPERTENSION: Chronic | ICD-10-CM

## 2020-07-28 RX ORDER — METOPROLOL SUCCINATE 50 MG/1
TABLET, EXTENDED RELEASE ORAL
Qty: 270 TABLET | Refills: 1 | Status: SHIPPED | OUTPATIENT
Start: 2020-07-28 | End: 2021-01-14

## 2020-07-29 ENCOUNTER — HOSPITAL ENCOUNTER (OUTPATIENT)
Dept: RADIOLOGY | Facility: HOSPITAL | Age: 60
Discharge: HOME | End: 2020-07-29
Payer: COMMERCIAL

## 2020-07-29 ENCOUNTER — HOSPITAL ENCOUNTER (OUTPATIENT)
Dept: ULTRASOUND IMAGING | Facility: HOSPITAL | Age: 60
Discharge: HOME | End: 2020-07-29
Payer: COMMERCIAL

## 2020-07-29 ENCOUNTER — APPOINTMENT (OUTPATIENT)
Dept: LAB | Facility: HOSPITAL | Age: 60
End: 2020-07-29
Payer: COMMERCIAL

## 2020-07-29 DIAGNOSIS — N20.0 KIDNEY STONES: ICD-10-CM

## 2020-07-29 DIAGNOSIS — Z12.5 PROSTATE CANCER SCREENING: ICD-10-CM

## 2020-07-29 LAB — PSA SERPL-MCNC: 0.8 NG/ML (ref 0–4)

## 2020-07-29 PROCEDURE — 51798 US URINE CAPACITY MEASURE: CPT

## 2020-07-29 PROCEDURE — 84153 ASSAY OF PSA TOTAL: CPT

## 2020-07-29 PROCEDURE — 74018 RADEX ABDOMEN 1 VIEW: CPT

## 2020-07-31 ENCOUNTER — TRANSCRIBE ORDERS (OUTPATIENT)
Dept: ADMINISTRATIVE | Facility: HOSPITAL | Age: 60
End: 2020-07-31

## 2020-07-31 ENCOUNTER — HOSPITAL ENCOUNTER (OUTPATIENT)
Dept: RADIOLOGY | Facility: HOSPITAL | Age: 60
Discharge: HOME | End: 2020-07-31
Payer: COMMERCIAL

## 2020-07-31 DIAGNOSIS — M25.561 RIGHT KNEE PAIN, UNSPECIFIED CHRONICITY: ICD-10-CM

## 2020-07-31 DIAGNOSIS — M25.561 RIGHT KNEE PAIN, UNSPECIFIED CHRONICITY: Primary | ICD-10-CM

## 2020-07-31 PROCEDURE — 73560 X-RAY EXAM OF KNEE 1 OR 2: CPT

## 2020-08-10 ENCOUNTER — OFFICE VISIT (OUTPATIENT)
Dept: FAMILY MEDICINE CLINIC | Facility: CLINIC | Age: 60
End: 2020-08-10
Payer: COMMERCIAL

## 2020-08-10 VITALS
DIASTOLIC BLOOD PRESSURE: 80 MMHG | SYSTOLIC BLOOD PRESSURE: 120 MMHG | HEIGHT: 68 IN | HEART RATE: 60 BPM | WEIGHT: 239.5 LBS | TEMPERATURE: 97.6 F | BODY MASS INDEX: 36.3 KG/M2

## 2020-08-10 DIAGNOSIS — M25.561 ACUTE PAIN OF RIGHT KNEE: ICD-10-CM

## 2020-08-10 DIAGNOSIS — K58.9 IRRITABLE BOWEL SYNDROME, UNSPECIFIED TYPE: ICD-10-CM

## 2020-08-10 DIAGNOSIS — Z86.79 H/O AORTIC ANEURYSM REPAIR: Chronic | ICD-10-CM

## 2020-08-10 DIAGNOSIS — E78.2 MIXED HYPERLIPIDEMIA: Chronic | ICD-10-CM

## 2020-08-10 DIAGNOSIS — R74.8 ALKALINE PHOSPHATASE ELEVATION: ICD-10-CM

## 2020-08-10 DIAGNOSIS — Z86.73 HISTORY OF STROKE: ICD-10-CM

## 2020-08-10 DIAGNOSIS — I35.0 NONRHEUMATIC AORTIC VALVE STENOSIS: ICD-10-CM

## 2020-08-10 DIAGNOSIS — R15.1 FECAL SOILING: ICD-10-CM

## 2020-08-10 DIAGNOSIS — Z98.890 H/O AORTIC ANEURYSM REPAIR: Chronic | ICD-10-CM

## 2020-08-10 DIAGNOSIS — I10 ESSENTIAL HYPERTENSION: Primary | ICD-10-CM

## 2020-08-10 DIAGNOSIS — K62.5 BRBPR (BRIGHT RED BLOOD PER RECTUM): ICD-10-CM

## 2020-08-10 DIAGNOSIS — I48.0 PAROXYSMAL ATRIAL FIBRILLATION (HCC): ICD-10-CM

## 2020-08-10 DIAGNOSIS — Z12.5 PROSTATE CANCER SCREENING: ICD-10-CM

## 2020-08-10 DIAGNOSIS — E80.6 HYPERBILIRUBINEMIA: ICD-10-CM

## 2020-08-10 PROBLEM — K52.9 COLITIS: Status: RESOLVED | Noted: 2018-04-26 | Resolved: 2020-08-10

## 2020-08-10 PROBLEM — M25.569 KNEE PAIN: Status: ACTIVE | Noted: 2020-08-10

## 2020-08-10 PROCEDURE — 3079F DIAST BP 80-89 MM HG: CPT | Performed by: FAMILY MEDICINE

## 2020-08-10 PROCEDURE — 3074F SYST BP LT 130 MM HG: CPT | Performed by: FAMILY MEDICINE

## 2020-08-10 PROCEDURE — 99214 OFFICE O/P EST MOD 30 MIN: CPT | Performed by: FAMILY MEDICINE

## 2020-08-10 PROCEDURE — 3725F SCREEN DEPRESSION PERFORMED: CPT | Performed by: FAMILY MEDICINE

## 2020-08-10 PROCEDURE — 3008F BODY MASS INDEX DOCD: CPT | Performed by: FAMILY MEDICINE

## 2020-08-10 PROCEDURE — 1036F TOBACCO NON-USER: CPT | Performed by: FAMILY MEDICINE

## 2020-08-10 NOTE — PATIENT INSTRUCTIONS
Problem List Items Addressed This Visit     Alkaline phosphatase elevation     Patient does have elevation in alk-phos  Recommend isoenzymes  Follow-up afterwards  Relevant Orders    Alkaline phosphatase, isoenzymes    Comprehensive metabolic panel    Aortic valve stenosis     Stable  Valve sounds to be doing well  More specifically, I did not hear any abnormalities today  No murmur  Atrial fibrillation (HCC)     Stable heart rate at the moment  I would caution him about using too much caffeine  This is more specifically about the supplement from chiropractic, green coffee extract  I am not sure how much caffeine exactly is in that, so he should just be cautious about it  BRBPR (bright red blood per rectum)     Patient has not had any bright red blood recently, but he did have this previously  He did have some slight changes in the stool color, but not severe  Since he has been to colorectal in the past, and he is close to having colonoscopy due, would recommend that he speak with colorectal at some point  Relevant Orders    Ambulatory referral to Colorectal Surgery    Fecal soiling     Patient reports this is improved completely  Again, consider follow with colorectal          Relevant Orders    Ambulatory referral to Colorectal Surgery    H/O aortic aneurysm repair (Chronic)     Doing well  Follows with cardiology  History of stroke     Stable  PT done  Looking to use [facility] in near future  Patient does have follow-up with neurology scheduled  Hyperbilirubinemia     Bilirubin is elevated  It was slightly high before, but it seems to have been increasing more  Check direct and total   Follow-up afterwards  Consider GI  Relevant Orders    Bilirubin, direct    Bilirubin, total    Comprehensive metabolic panel    Hyperlipidemia (Chronic)     Cholesterol is excellent  AST and ALT are normal   No changes at this point  Check in 6 months  Relevant Orders    Cholesterol, total    Comprehensive metabolic panel    HDL cholesterol    LDL cholesterol, direct    Hypertension - Primary     Blood pressure today is doing quite well  No changes  Continue current treatment  This would include amlodipine, metoprolol  Relevant Orders    Comprehensive metabolic panel    Irritable bowel syndrome     Patient did have IBS at 1 point  Continue to follow with colorectal at some point as well  Relevant Orders    Ambulatory referral to Colorectal Surgery    Knee pain     Patient did have negative x-ray, but continues to have some minor swelling posterior of the right knee  There was no laxity in the joint  Recommend consider orthopedic follow-up  This would be for the possible Baker cyst   We could alternatively do an ultrasound of the right knee  Relevant Orders    Ambulatory referral to Orthopedic Surgery      Other Visit Diagnoses     Prostate cancer screening        Has appointment in near future for Urology  Defer prostate cancer screening to them  COVID 19 Instructions    Hans Christina 22 was advised to limit contact with others to essential tasks such as getting food, medications, and medical care  Proper handwashing reviewed, and Hand sanitzer when washing is not available  If the patient develops symptoms of COVID 19, the patient should call the office as soon as possible  Please try to download [employer] Duo  Once you do download this on your phone, you will be prompted to add your phone number to the account  After that, he should receive a text from [employer], and use that code to verify your phone number  After that, you should be able to use Google Duo to receive and make video calls  Please download Microsoft Teams to your phone or computer  We will be transitioning to this platform for Video Visits  Instructions for downloading this are available from the office

## 2020-08-10 NOTE — ASSESSMENT & PLAN NOTE
Stable  Valve sounds to be doing well  More specifically, I did not hear any abnormalities today  No murmur

## 2020-08-10 NOTE — ASSESSMENT & PLAN NOTE
Stable heart rate at the moment  I would caution him about using too much caffeine  This is more specifically about the supplement from chiropractic, green coffee extract  I am not sure how much caffeine exactly is in that, so he should just be cautious about it

## 2020-08-10 NOTE — ASSESSMENT & PLAN NOTE
Blood pressure today is doing quite well  No changes  Continue current treatment  This would include amlodipine, metoprolol

## 2020-08-10 NOTE — ASSESSMENT & PLAN NOTE
Stable  PT done  Looking to use [facility] in near future  Patient does have follow-up with neurology scheduled

## 2020-08-10 NOTE — ASSESSMENT & PLAN NOTE
Patient has not had any bright red blood recently, but he did have this previously  He did have some slight changes in the stool color, but not severe  Since he has been to colorectal in the past, and he is close to having colonoscopy due, would recommend that he speak with colorectal at some point

## 2020-08-10 NOTE — PROGRESS NOTES
Assessment and Plan:    Problem List Items Addressed This Visit     Alkaline phosphatase elevation     Patient does have elevation in alk-phos  Recommend isoenzymes  Follow-up afterwards  Relevant Orders    Alkaline phosphatase, isoenzymes    Comprehensive metabolic panel    Aortic valve stenosis     Stable  Valve sounds to be doing well  More specifically, I did not hear any abnormalities today  No murmur  Atrial fibrillation (HCC)     Stable heart rate at the moment  I would caution him about using too much caffeine  This is more specifically about the supplement from chiropractic, green coffee extract  I am not sure how much caffeine exactly is in that, so he should just be cautious about it  BRBPR (bright red blood per rectum)     Patient has not had any bright red blood recently, but he did have this previously  He did have some slight changes in the stool color, but not severe  Since he has been to colorectal in the past, and he is close to having colonoscopy due, would recommend that he speak with colorectal at some point  Relevant Orders    Ambulatory referral to Colorectal Surgery    Fecal soiling     Patient reports this is improved completely  Again, consider follow with colorectal          Relevant Orders    Ambulatory referral to Colorectal Surgery    H/O aortic aneurysm repair (Chronic)     Doing well  Follows with cardiology  History of stroke     Stable  PT done  Looking to use [employer] in near future  Patient does have follow-up with neurology scheduled  Hyperbilirubinemia     Bilirubin is elevated  It was slightly high before, but it seems to have been increasing more  Check direct and total   Follow-up afterwards  Consider GI  Relevant Orders    Bilirubin, direct    Bilirubin, total    Comprehensive metabolic panel    Hyperlipidemia (Chronic)     Cholesterol is excellent  AST and ALT are normal   No changes at this point    Check in 6 months  Relevant Orders    Cholesterol, total    Comprehensive metabolic panel    HDL cholesterol    LDL cholesterol, direct    Hypertension - Primary     Blood pressure today is doing quite well  No changes  Continue current treatment  This would include amlodipine, metoprolol  Relevant Orders    Comprehensive metabolic panel    Irritable bowel syndrome     Patient did have IBS at 1 point  Continue to follow with colorectal at some point as well  Relevant Orders    Ambulatory referral to Colorectal Surgery    Knee pain     Patient did have negative x-ray, but continues to have some minor swelling posterior of the right knee  There was no laxity in the joint  Recommend consider orthopedic follow-up  This would be for the possible Baker cyst   We could alternatively do an ultrasound of the right knee  Relevant Orders    Ambulatory referral to Orthopedic Surgery      Other Visit Diagnoses     Prostate cancer screening        Has appointment in near future for Urology  Defer prostate cancer screening to them  Diagnoses and all orders for this visit:    Essential hypertension  -     Comprehensive metabolic panel; Future    Irritable bowel syndrome, unspecified type  -     Ambulatory referral to Colorectal Surgery; Future    Paroxysmal atrial fibrillation (HCC)    BRBPR (bright red blood per rectum)  -     Ambulatory referral to Colorectal Surgery; Future    Fecal soiling  -     Ambulatory referral to Colorectal Surgery; Future    Nonrheumatic aortic valve stenosis    H/O aortic aneurysm repair    History of stroke    Mixed hyperlipidemia  -     Cholesterol, total; Future  -     Comprehensive metabolic panel; Future  -     HDL cholesterol; Future  -     LDL cholesterol, direct; Future    Hyperbilirubinemia  -     Bilirubin, direct; Future  -     Bilirubin, total; Future  -     Comprehensive metabolic panel;  Future    Alkaline phosphatase elevation  - Alkaline phosphatase, isoenzymes; Future  -     Comprehensive metabolic panel; Future    Acute pain of right knee  -     Ambulatory referral to Orthopedic Surgery; Future    Prostate cancer screening  Comments:  Has appointment in near future for Urology  Defer prostate cancer screening to them  Subjective:      Patient ID: Teagan Rasmussen is a 61 y o  male  CC:    Chief Complaint   Patient presents with    Follow-up     f/u to chronic conditions and review lab results  mjs    Knee Pain     c/o had sudden pain in the back of right knee while sleeping and one other incident while standing  He had an xray from [facility]       HPI:    Patient is here to follow-up on multiple issues  Has follow-up with urology tomorrow  They did order PSA  Has appointment tomorrow with Urology to review  3 weeks ao, had severe pain in the right leg  Was severe  Had cramp and couldn't move  Had repeat episode at [facility]   He did talk to [facility] from insurance  They ordered XR  X-ray was done at [facility], and showed that the patient had no osseous abnormalities  Has pain in back of legs  Went to [facility]  Treatment did help  CAD: he is seeing Cardio again in Oct     CVA: Neuro follow up is scheduled  Glaucoma: He states he saw Ophtho, and they recommended laser surgery  Alternate was drops, but likely surgery later  Colon cancer screening:  Patient did have a colonoscopy in 2012 per records  This was done by Dr Sonam Reddy  He has not had any screening done afterwards  He did mention that he had some abdominal discomfort recently, and had a dark stool  That seemed to light up over the next 2 days, and was back to normal at that point  Chiro recommend patient take a homeopathic med to help break up fat  Patient switched to gluten free to try to loose weight  Supplement was Green Coffee extract  Tremor of right arm is a bit worse lately  He is noting some good, and some bad days        The following portions of the patient's history were reviewed and updated as appropriate: allergies, current medications, past family history, past medical history, past social history, past surgical history and problem list       Review of Systems   Constitutional: Negative  HENT: Negative  Eyes: Negative  Respiratory: Negative  Cardiovascular: Negative  Gastrointestinal: Negative  Endocrine: Negative  Genitourinary: Negative  Musculoskeletal: Negative  Skin: Negative  Allergic/Immunologic: Negative  Neurological: Positive for tremors  Hematological: Negative  Psychiatric/Behavioral: Negative  Data to review:   Sodium 139, potassium 3 9, calcium 8 6  Creatinine 1 09, GFR 73  Blood sugar 106  AST 23, ALT 38  Alk-phos 158  Bilirubin 1 79  Total cholesterol 101, LDL 54, HDL 35, triglycerides 58  Vitamin-D 32 6  PSA 0 8  Objective:    Vitals:    08/10/20 0908   BP: 120/80   Pulse: 60   Temp: 97 6 °F (36 4 °C)   Weight: 109 kg (239 lb 8 oz)   Height: 5' 8" (1 727 m)        Physical Exam  Vitals signs and nursing note reviewed  Constitutional:       Appearance: He is well-developed  HENT:      Head: Normocephalic and atraumatic  Neck:      Musculoskeletal: Normal range of motion and neck supple  Cardiovascular:      Rate and Rhythm: Normal rate and regular rhythm  Pulses:           Carotid pulses are 2+ on the right side and 2+ on the left side  Heart sounds: Normal heart sounds  No murmur  No friction rub  No gallop  Pulmonary:      Effort: Pulmonary effort is normal  No respiratory distress  Breath sounds: Normal breath sounds  No wheezing or rales  Musculoskeletal:      Right knee: He exhibits swelling (Posterolateral of the knee)  He exhibits normal range of motion, no ecchymosis, no LCL laxity and no MCL laxity  Tenderness found          Legs:

## 2020-08-10 NOTE — ASSESSMENT & PLAN NOTE
Bilirubin is elevated  It was slightly high before, but it seems to have been increasing more  Check direct and total   Follow-up afterwards  Consider GI

## 2020-08-11 ENCOUNTER — OFFICE VISIT (OUTPATIENT)
Dept: UROLOGY | Facility: CLINIC | Age: 60
End: 2020-08-11
Payer: COMMERCIAL

## 2020-08-11 VITALS
BODY MASS INDEX: 36.34 KG/M2 | WEIGHT: 239.8 LBS | HEART RATE: 55 BPM | TEMPERATURE: 97.7 F | DIASTOLIC BLOOD PRESSURE: 88 MMHG | HEIGHT: 68 IN | SYSTOLIC BLOOD PRESSURE: 146 MMHG

## 2020-08-11 DIAGNOSIS — N48.6 PEYRONIE DISEASE: Primary | ICD-10-CM

## 2020-08-11 DIAGNOSIS — N20.0 KIDNEY STONES: ICD-10-CM

## 2020-08-11 DIAGNOSIS — Z12.5 PROSTATE CANCER SCREENING: ICD-10-CM

## 2020-08-11 PROCEDURE — 3077F SYST BP >= 140 MM HG: CPT | Performed by: NURSE PRACTITIONER

## 2020-08-11 PROCEDURE — 3079F DIAST BP 80-89 MM HG: CPT | Performed by: NURSE PRACTITIONER

## 2020-08-11 PROCEDURE — 3008F BODY MASS INDEX DOCD: CPT | Performed by: NURSE PRACTITIONER

## 2020-08-11 PROCEDURE — 99213 OFFICE O/P EST LOW 20 MIN: CPT | Performed by: NURSE PRACTITIONER

## 2020-08-11 PROCEDURE — 1036F TOBACCO NON-USER: CPT | Performed by: NURSE PRACTITIONER

## 2020-08-11 NOTE — PROGRESS NOTES
8/11/2020    Sunil Fried  1960  [id number]        Assessment  Mild Peyronies disease  Prostate cancer screening  Kidney stones s/p ESWL (5/17/2018)  Left renal cyst    Discussion  Roxann Myrick is a 61 y o  male being managed by Miguelina Yanez  PSA remains stable at 0 8  KUB was reviewed and no stones were visualized, however his renal ultrasound showed a nonobstructing 6 mm right lower pole calculus and 3 mm left lower pole calculus  Left renal cyst remains stable at 1 3cm  His PVR was minimal   We discussed diet and hydration modifications to prevent  kidney stone formation  He understands that he should notify our office if he should develop any kidney stone symptoms  He will otherwise return in 1 year for follow-up with PSA, REGI and renal ultrasound  History of Present Illness  61 y o  male with a history of mild peyronies disease and kidney stones presents today for  1 year follow up  He states that occasionally he will developed a twinge in his groin area  States this is intermittent and will last up to a day  He is experiencing both the left and right groin area  He states that when he increase his water the symptoms then resolved  He has not had any nausea or vomiting with these episodes  He denies any lower urinary tract symptoms  He has not had any gross hematuria  He is not visually seen the passage of the stone  He does have occasional urinary urgency  He admits to drinking a large amount of iced tea on a daily basis  He denies any worsening curvature of his Peyronie's disease  He does admit to weakness with ejaculation  He is able to achieve a satisfactory erection  He suffered a stroke in March and has had residual strength impairment  He is otherwise doing well  Review of Systems  Review of Systems   Constitutional: Negative  HENT: Negative  Respiratory: Negative  Cardiovascular: Negative  Gastrointestinal: Negative      Genitourinary:        As per HPI Musculoskeletal: Negative  Skin: Negative  Neurological: Negative  Hematological: Negative  AUA SYMPTOM SCORE      Most Recent Value   AUA SYMPTOM SCORE   How often have you had a sensation of not emptying your bladder completely after you finished urinating? 0   How often have you had to urinate again less than two hours after you finished urinating? 1   How often have you found you stopped and started again several times when you urinate?  0   How often have you found it difficult to postpone urination? 1   How often have you had a weak urinary stream?  0   How often have you had to push or strain to begin urination? 0   How many times did you most typically get up to urinate from the time you went to bed at night until the time you got up in the morning?   2   Quality of Life: If you were to spend the rest of your life with your urinary condition just the way it is now, how would you feel about that?  2   AUA SYMPTOM SCORE  4              Past Medical History  Past Medical History:   Diagnosis Date    Aneurysm of thoracic aorta ([address] )     last assessed 11/20/17    Anxiety     currently on no meds    Cardiac disease     Cholelithiasis     Chronic kidney disease     GERD (gastroesophageal reflux disease)     Headache due to anesthesia     Hyperlipidemia     Hypertension     Irritable bowel syndrome     Kidney stone     Palpitations     PONV (postoperative nausea and vomiting)     Shortness of breath     ? related to acid reflux    Sleep apnea     not sure    [facility]roke [facility] 11/2014    TIA (transient ischemic attack)        Past Surgical History  Past Surgical History:   Procedure Laterality Date    AORTA SURGERY      thoracic - aneurysmorrhaphy    APPENDECTOMY      ASCENDING AORTIC ANEURYSM REPAIR      resolved 8/19/15    CHOLECYSTECTOMY      laparoscopic    CYSTOSCOPY      with removal of object- stent removal    KNEE ARTHROSCOPY Right 1996    with medial meniscus repair  LITHOTRIPSY      renal    AL FRAGMENT KIDNEY STONE/ ESWL Left 5/17/2018    Procedure: LITHROTRIPSY EXTRACORPORAL SHOCKWAVE (ESWL);   Surgeon: Montse Smiley MD;  Location: [facility];  Service: Urology    THUMB ARTHROSCOPY Right 1976    ligament repair        Past Family History  Family History   Problem Relation Age of Onset    Diverticulitis Mother         of colon    Nephrolithiasis Mother     Emphysema Father     Nephrolithiasis Sister     Heart attack Maternal Grandfather 72    Breast cancer Paternal Grandmother     Lung cancer Paternal Grandfather     Cancer Family     Coronary artery disease Family     Diabetes Family         sibling    Hypertension Family         sibling    Hernia Family         sibling       Past Social history  Social History     Socioeconomic History    Marital status:      Spouse name: Not on file    Number of children: Not on file    Years of education: Not on file    Highest education level: Not on file   Occupational History    Occupation: disabled     Social Needs    Financial resource strain: Not on file    Food insecurity     Worry: Not on file     Inability: Not on file   [employer] needs     Medical: Not on file     Non-medical: Not on file   Tobacco Use    Smoking status: Never Smoker    Smokeless tobacco: Never Used    Tobacco comment: no second hand smoke exposure   Substance and Sexual Activity    Alcohol use: Not Currently    Drug use: No    Sexual activity: Not on file   Lifestyle    Physical activity     Days per week: Not on file     Minutes per session: Not on file    Stress: Not on file   Relationships    Social connections     Talks on phone: Not on file     Gets together: Not on file     Attends Orthodox service: Not on file     Active member of club or organization: Not on file     Attends meetings of clubs or organizations: Not on file     Relationship status: Not on file    Intimate partner violence     Fear of current or ex partner: Not on file     Emotionally abused: Not on file     Physically abused: Not on file     Forced sexual activity: Not on file   Other Topics Concern    Not on file   Social History Narrative    Caffeine use    Completed some college     as per [employer]       Current Medications  Current Outpatient Medications   Medication Sig Dispense Refill    amLODIPine (NORVASC) 5 mg tablet TAKE 1 TABLET BY MOUTH EVERY DAY 90 tablet 0    apixaban (ELIQUIS) 5 mg Take 1 tablet (5 mg total) by mouth 2 (two) times a day 60 tablet 5    aspirin (ECOTRIN LOW STRENGTH) 81 mg EC tablet Take 1 tablet (81 mg total) by mouth daily 30 tablet 0    atorvastatin (LIPITOR) 40 mg tablet Take 1 tablet (40 mg total) by mouth daily with dinner 30 tablet 5    cholecalciferol (VITAMIN D3) 1,000 units tablet Take 1 tablet (1,000 Units total) by mouth daily 30 tablet 5    famotidine (PEPCID) 20 mg tablet Take 1 tablet (20 mg total) by mouth daily 30 tablet 2    metoprolol succinate (TOPROL-XL) 50 mg 24 hr tablet TAKE 3 TABLETS BY MOUTH EVERY  tablet 1    pantoprazole (PROTONIX) 40 mg tablet TAKE 1 TABLET BY MOUTH EVERY DAY 90 tablet 0     No current facility-administered medications for this visit  Allergies  Allergies   Allergen Reactions    Losartan Angioedema    Bactrim [Sulfamethoxazole-Trimethoprim]     Lisinopril      Felt bad, was OK with Zestril brand name   Tramadol        Past Medical History, Social History, Family History, medications and allergies were reviewed and updated as appropriate  Vitals  Vitals:    08/11/20 0842   BP: 146/88   BP Location: Left arm   Patient Position: Sitting   Cuff Size: Adult   Pulse: 55   Temp: 97 7 °F (36 5 °C)   Weight: 109 kg (239 lb 12 8 oz)   Height: 5' 8" (1 727 m)       Physical Exam  Skin: warm, dry, intact  Cardiac:Peripheral edema: negative  Pulmonary: Non-labored breathing  Abdomen: Soft, non-tender, non-distended      Musculoskeletal: AROM with no joint deformity or tenderness  Neurology: Alert and oriented  Genitourinary: Negative CVA tenderness, negative suprapubic tenderness  (Male): REGI: Prostate is enlarged at 40 grams  No nodules noted  Results  Lab Results   Component Value Date    PSA 0 8 07/29/2020    PSA 0 8 08/15/2019     Lab Results   Component Value Date    GLUCOSE 103 12/08/2014    CALCIUM 8 6 07/23/2020     03/29/2017    K 3 9 07/23/2020    CO2 24 07/23/2020     07/23/2020    BUN 13 07/23/2020    CREATININE 1 09 07/23/2020     Lab Results   Component Value Date    WBC 7 56 03/26/2020    HGB 13 9 03/26/2020    HCT 40 7 03/26/2020    MCV 89 03/26/2020     03/26/2020           FINDINGS:     KIDNEYS:  Symmetric and normal size  Right kidney:  11 6 x 5 1 x 5 6 cm  Left kidney:  12 6 x 5 1 x 5 8 cm      Right kidney  Normal echogenicity and contour  No suspicious masses detected  No hydronephrosis  Nonshadowing echogenic focus at the upper pole measures 6 mm  No perinephric fluid collections      Left kidney  Normal echogenicity and contour  No suspicious masses detected  Small lower pole cortical cyst measures up to 1 3 cm  No hydronephrosis  Nonshadowing echogenic focus at the lower pole measures 3 mm  Additional nonshadowing echogenic focus at the lower pole measures 6 mm  No perinephric fluid collections      URETERS:  Nonvisualized      BLADDER:   Normally distended  Prevoid bladder volume is 150 mL  No focal thickening or mass lesions  Bilateral ureteral jets detected  No significant postvoid residual urine volume  Postvoid bladder volume is 11 mL      IMPRESSION:     1  Nonshadowing echogenic foci in both kidneys suspect nonshadowing renal calculi  2   Small left renal cyst   3   Bladder is unremarkable  FINDINGS:     No radiopaque renal calculi seen      No radiopaque ureteral calculi identified      Nonobstructive bowel gas pattern    There is scattered feces in the colon      No acute osseous abnormality is seen      IMPRESSION:     No radiopaque urinary tract calculi

## 2020-08-17 DIAGNOSIS — K21.9 GASTROESOPHAGEAL REFLUX DISEASE WITHOUT ESOPHAGITIS: ICD-10-CM

## 2020-08-18 RX ORDER — FAMOTIDINE 20 MG/1
TABLET, FILM COATED ORAL
Qty: 30 TABLET | Refills: 2 | Status: SHIPPED | OUTPATIENT
Start: 2020-08-18 | End: 2020-08-19 | Stop reason: SDUPTHER

## 2020-08-19 DIAGNOSIS — K21.9 GASTROESOPHAGEAL REFLUX DISEASE WITHOUT ESOPHAGITIS: ICD-10-CM

## 2020-08-19 RX ORDER — FAMOTIDINE 20 MG/1
TABLET, FILM COATED ORAL
Qty: 30 TABLET | Refills: 2 | Status: SHIPPED | OUTPATIENT
Start: 2020-08-19 | End: 2020-09-16 | Stop reason: ALTCHOICE

## 2020-08-20 ENCOUNTER — CONSULT (OUTPATIENT)
Dept: OBGYN CLINIC | Facility: MEDICAL CENTER | Age: 60
End: 2020-08-20
Payer: COMMERCIAL

## 2020-08-20 VITALS
DIASTOLIC BLOOD PRESSURE: 80 MMHG | HEART RATE: 58 BPM | BODY MASS INDEX: 36.22 KG/M2 | TEMPERATURE: 98 F | SYSTOLIC BLOOD PRESSURE: 131 MMHG | WEIGHT: 239 LBS | HEIGHT: 68 IN

## 2020-08-20 DIAGNOSIS — M17.11 PRIMARY OSTEOARTHRITIS OF RIGHT KNEE: Primary | ICD-10-CM

## 2020-08-20 DIAGNOSIS — M25.561 RIGHT KNEE PAIN, UNSPECIFIED CHRONICITY: ICD-10-CM

## 2020-08-20 DIAGNOSIS — M25.561 ACUTE PAIN OF RIGHT KNEE: ICD-10-CM

## 2020-08-20 DIAGNOSIS — M16.11 PRIMARY OSTEOARTHRITIS OF ONE HIP, RIGHT: ICD-10-CM

## 2020-08-20 PROCEDURE — 20610 DRAIN/INJ JOINT/BURSA W/O US: CPT | Performed by: ORTHOPAEDIC SURGERY

## 2020-08-20 PROCEDURE — 3079F DIAST BP 80-89 MM HG: CPT | Performed by: ORTHOPAEDIC SURGERY

## 2020-08-20 PROCEDURE — 99203 OFFICE O/P NEW LOW 30 MIN: CPT | Performed by: ORTHOPAEDIC SURGERY

## 2020-08-20 PROCEDURE — 1036F TOBACCO NON-USER: CPT | Performed by: ORTHOPAEDIC SURGERY

## 2020-08-20 PROCEDURE — 3008F BODY MASS INDEX DOCD: CPT | Performed by: ORTHOPAEDIC SURGERY

## 2020-08-20 PROCEDURE — 3075F SYST BP GE 130 - 139MM HG: CPT | Performed by: ORTHOPAEDIC SURGERY

## 2020-08-20 RX ORDER — METHYLPREDNISOLONE ACETATE 40 MG/ML
1 INJECTION, SUSPENSION INTRA-ARTICULAR; INTRALESIONAL; INTRAMUSCULAR; SOFT TISSUE
Status: COMPLETED | OUTPATIENT
Start: 2020-08-20 | End: 2020-08-20

## 2020-08-20 RX ORDER — LIDOCAINE HYDROCHLORIDE 10 MG/ML
3 INJECTION, SOLUTION INFILTRATION; PERINEURAL
Status: COMPLETED | OUTPATIENT
Start: 2020-08-20 | End: 2020-08-20

## 2020-08-20 RX ADMIN — METHYLPREDNISOLONE ACETATE 1 ML: 40 INJECTION, SUSPENSION INTRA-ARTICULAR; INTRALESIONAL; INTRAMUSCULAR; SOFT TISSUE at 08:38

## 2020-08-20 RX ADMIN — LIDOCAINE HYDROCHLORIDE 3 ML: 10 INJECTION, SOLUTION INFILTRATION; PERINEURAL at 08:38

## 2020-08-22 DIAGNOSIS — K21.9 GASTROESOPHAGEAL REFLUX DISEASE WITHOUT ESOPHAGITIS: ICD-10-CM

## 2020-08-22 DIAGNOSIS — R07.89 CHEST PAIN, ATYPICAL: ICD-10-CM

## 2020-08-24 RX ORDER — FAMOTIDINE 40 MG/1
40 TABLET, FILM COATED ORAL DAILY
Qty: 90 TABLET | Refills: 1 | OUTPATIENT
Start: 2020-08-24 | End: 2021-02-20

## 2020-08-24 NOTE — TELEPHONE ENCOUNTER
Famotidine was decreased to 20 mg previously  Would not make any adjustments today  Refused medication

## 2020-08-25 ENCOUNTER — APPOINTMENT (OUTPATIENT)
Dept: LAB | Facility: HOSPITAL | Age: 60
End: 2020-08-25
Payer: COMMERCIAL

## 2020-08-25 DIAGNOSIS — E80.6 HYPERBILIRUBINEMIA: ICD-10-CM

## 2020-08-25 DIAGNOSIS — R74.8 ALKALINE PHOSPHATASE ELEVATION: ICD-10-CM

## 2020-08-25 LAB
BILIRUB DIRECT SERPL-MCNC: 0.32 MG/DL (ref 0–0.2)
BILIRUB SERPL-MCNC: 2.04 MG/DL (ref 0.2–1)

## 2020-08-25 PROCEDURE — 82248 BILIRUBIN DIRECT: CPT

## 2020-08-25 PROCEDURE — 84080 ASSAY ALKALINE PHOSPHATASES: CPT

## 2020-08-25 PROCEDURE — 84075 ASSAY ALKALINE PHOSPHATASE: CPT

## 2020-08-25 PROCEDURE — 82247 BILIRUBIN TOTAL: CPT

## 2020-08-25 PROCEDURE — 36415 COLL VENOUS BLD VENIPUNCTURE: CPT

## 2020-08-28 LAB
ALP BONE CFR SERPL: 20 % (ref 12–68)
ALP INTEST CFR SERPL: 5 % (ref 0–18)
ALP LIVER CFR SERPL: 75 % (ref 13–88)
ALP SERPL-CCNC: 147 IU/L (ref 39–117)

## 2020-09-16 ENCOUNTER — OFFICE VISIT (OUTPATIENT)
Dept: FAMILY MEDICINE CLINIC | Facility: CLINIC | Age: 60
End: 2020-09-16
Payer: COMMERCIAL

## 2020-09-16 DIAGNOSIS — J02.9 SORE THROAT: ICD-10-CM

## 2020-09-16 DIAGNOSIS — I10 ESSENTIAL HYPERTENSION: Chronic | ICD-10-CM

## 2020-09-16 DIAGNOSIS — I48.0 PAROXYSMAL ATRIAL FIBRILLATION (HCC): ICD-10-CM

## 2020-09-16 DIAGNOSIS — K21.9 GERD (GASTROESOPHAGEAL REFLUX DISEASE): ICD-10-CM

## 2020-09-16 DIAGNOSIS — E78.5 HYPERLIPIDEMIA: Chronic | ICD-10-CM

## 2020-09-16 DIAGNOSIS — E80.6 HYPERBILIRUBINEMIA: ICD-10-CM

## 2020-09-16 DIAGNOSIS — R74.8 ALKALINE PHOSPHATASE ELEVATION: Primary | ICD-10-CM

## 2020-09-16 PROCEDURE — 99213 OFFICE O/P EST LOW 20 MIN: CPT | Performed by: FAMILY MEDICINE

## 2020-09-16 RX ORDER — ATORVASTATIN CALCIUM 40 MG/1
40 TABLET, FILM COATED ORAL
Qty: 90 TABLET | Refills: 1 | Status: SHIPPED | OUTPATIENT
Start: 2020-09-16 | End: 2021-03-24

## 2020-09-16 RX ORDER — AMLODIPINE BESYLATE 5 MG/1
5 TABLET ORAL DAILY
Qty: 90 TABLET | Refills: 1 | Status: SHIPPED | OUTPATIENT
Start: 2020-09-16 | End: 2021-05-20

## 2020-09-16 RX ORDER — PANTOPRAZOLE SODIUM 40 MG/1
40 TABLET, DELAYED RELEASE ORAL DAILY
Qty: 90 TABLET | Refills: 1 | Status: SHIPPED | OUTPATIENT
Start: 2020-09-16 | End: 2021-03-31

## 2020-09-16 NOTE — PROGRESS NOTES
Assessment and Plan:    Problem List Items Addressed This Visit     Alkaline phosphatase elevation - Primary     Alk-phos isoenzymes were normal, and the absolute number was slightly lower than prior  Continues to be a question as to cause  Continue to observe  Atrial fibrillation [facility])     Patient does have atrial fibrillation  Currently on Eliquis  Seems to be doing well  Did have some rectal bleeding, but no bruising that he is aware of  Continue to observe  As far as going for colonoscopy, it would be reasonable to stop the aspirin and Eliquis prior to the procedure  The risk of staying on them during the procedure is significant bleeding  Relevant Medications    amLODIPine (NORVASC) 5 mg tablet    GERD (gastroesophageal reflux disease)     Patient has been cutting back on using pantoprazole  He discontinued using the famotidine  Minimal symptoms at this point  Continue with his current treatment  Relevant Medications    pantoprazole (PROTONIX) 40 mg tablet    Hyperbilirubinemia     Bilirubin is slightly elevated verses prior  Total and direct are both elevated  Alk phos is slightly better than before  At this point, can follow in approximately 6 months with next labs (which are already ordered), and then consider following with GI if it continues to be elevated  Hyperlipidemia (Chronic)    Relevant Medications    atorvastatin (LIPITOR) 40 mg tablet    Hypertension    Relevant Medications    amLODIPine (NORVASC) 5 mg tablet    Sore throat     Oropharynx was normal   Continue to observe  No postnasal drip  Recommend saltwater gargles  Diagnoses and all orders for this visit:    Alkaline phosphatase elevation    GERD (gastroesophageal reflux disease)  -     pantoprazole (PROTONIX) 40 mg tablet; Take 1 tablet (40 mg total) by mouth daily    Hyperlipidemia  -     atorvastatin (LIPITOR) 40 mg tablet;  Take 1 tablet (40 mg total) by mouth daily with dinner    Essential hypertension  -     amLODIPine (NORVASC) 5 mg tablet; Take 1 tablet (5 mg total) by mouth daily    Paroxysmal atrial fibrillation (HCC)    Hyperbilirubinemia    Sore throat              Subjective:      Patient ID: Chriss Nelson is a 61 y o  male  CC:    Chief Complaint   Patient presents with    Follow-up    Results    Medicare Wellness Visit     Pt states he did this with the insurance a month or so ago  kw       HPI:    Patient is scheduled for Colonoscopy for colon cancer screen  He was concerned about the stopping of aspirin and Eliquis  Reviewed stopping these as normal part of protocol for surgical intervention  Alk phos was reviewed  It was higher before  This current level is slightly lower, and the isoenzymes were balanced  Total bilirubin was slightly elevated  This was higher than prior testing  Atrial fibrillation:  Currently on Eliquis  Patient was concerned about stopping it for colonoscopy as above  Denies any current bruising or other problems  Dryness and sore throat and some itchyness as well  No post nasal drip  Has been doing well otherwise  The following portions of the patient's history were reviewed and updated as appropriate: allergies, current medications, past family history, past medical history, past social history, past surgical history and problem list       Review of Systems   Constitutional: Negative  HENT: Negative  Respiratory: Negative  Cardiovascular: Negative  Gastrointestinal: Negative  Data to review:       Objective: There were no vitals filed for this visit  Physical Exam  Vitals signs and nursing note reviewed  Constitutional:       Appearance: Normal appearance  Neck:      Vascular: No carotid bruit  Cardiovascular:      Rate and Rhythm: Normal rate and regular rhythm  Pulses: Normal pulses  Carotid pulses are 2+ on the right side and 2+ on the left side  Heart sounds: Normal heart sounds  No murmur  No gallop  Pulmonary:      Effort: Pulmonary effort is normal  No respiratory distress  Breath sounds: Normal breath sounds  No stridor  No wheezing, rhonchi or rales  Chest:      Chest wall: No tenderness  Neurological:      Mental Status: He is alert

## 2020-09-17 NOTE — ASSESSMENT & PLAN NOTE
Bilirubin is slightly elevated verses prior  Total and direct are both elevated  Alk phos is slightly better than before  At this point, can follow in approximately 6 months with next labs (which are already ordered), and then consider following with GI if it continues to be elevated

## 2020-09-17 NOTE — ASSESSMENT & PLAN NOTE
Alk-phos isoenzymes were normal, and the absolute number was slightly lower than prior  Continues to be a question as to cause  Continue to observe

## 2020-09-17 NOTE — ASSESSMENT & PLAN NOTE
Patient does have atrial fibrillation  Currently on Eliquis  Seems to be doing well  Did have some rectal bleeding, but no bruising that he is aware of  Continue to observe  As far as going for colonoscopy, it would be reasonable to stop the aspirin and Eliquis prior to the procedure  The risk of staying on them during the procedure is significant bleeding

## 2020-09-17 NOTE — PATIENT INSTRUCTIONS
Problem List Items Addressed This Visit     Alkaline phosphatase elevation - Primary     Alk-phos isoenzymes were normal, and the absolute number was slightly lower than prior  Continues to be a question as to cause  Continue to observe  Atrial fibrillation [facility])     Patient does have atrial fibrillation  Currently on Eliquis  Seems to be doing well  Did have some rectal bleeding, but no bruising that he is aware of  Continue to observe  As far as going for colonoscopy, it would be reasonable to stop the aspirin and Eliquis prior to the procedure  The risk of staying on them during the procedure is significant bleeding  Relevant Medications    amLODIPine (NORVASC) 5 mg tablet    GERD (gastroesophageal reflux disease)     Patient has been cutting back on using pantoprazole  He discontinued using the famotidine  Minimal symptoms at this point  Continue with his current treatment  Relevant Medications    pantoprazole (PROTONIX) 40 mg tablet    Hyperbilirubinemia     Bilirubin is slightly elevated verses prior  Total and direct are both elevated  Alk phos is slightly better than before  At this point, can follow in approximately 6 months with next labs (which are already ordered), and then consider following with GI if it continues to be elevated  Hyperlipidemia (Chronic)    Relevant Medications    atorvastatin (LIPITOR) 40 mg tablet    Hypertension    Relevant Medications    amLODIPine (NORVASC) 5 mg tablet    Sore throat     Oropharynx was normal   Continue to observe  No postnasal drip  Recommend saltwater gargles  COVID 19 Instructions    Hans Miranda was advised to limit contact with others to essential tasks such as getting food, medications, and medical care  Proper handwashing reviewed, and Hand sanitzer when washing is not available      If the patient develops symptoms of COVID 19, the patient should call the office as soon as possible  For [id number] Flu season, it is strongly recommended that Flu Vaccinations be obtained  Please try to download [employer] Du[employer]  Once you do download this on your phone, you will be prompted to add your phone number to the account  After that, he should receive a text from [employer], and use that code to verify your phone number  After that, you should be able to use [employer] Duo to receive and make video calls  Please download [employer] Teams to your phone or computer  We will be transitioning to this platform for Video Visits  Instructions for downloading this are available from the office

## 2020-09-17 NOTE — ASSESSMENT & PLAN NOTE
Patient has been cutting back on using pantoprazole  He discontinued using the famotidine  Minimal symptoms at this point  Continue with his current treatment

## 2020-09-22 ENCOUNTER — EVALUATION (OUTPATIENT)
Dept: PHYSICAL THERAPY | Facility: REHABILITATION | Age: 60
End: 2020-09-22
Payer: COMMERCIAL

## 2020-09-22 ENCOUNTER — TELEPHONE (OUTPATIENT)
Dept: HEMATOLOGY ONCOLOGY | Facility: CLINIC | Age: 60
End: 2020-09-22

## 2020-09-22 DIAGNOSIS — M25.561 RIGHT KNEE PAIN, UNSPECIFIED CHRONICITY: Primary | ICD-10-CM

## 2020-09-22 DIAGNOSIS — M16.11 PRIMARY OSTEOARTHRITIS OF RIGHT HIP: ICD-10-CM

## 2020-09-22 DIAGNOSIS — M17.11 OSTEOARTHRITIS OF RIGHT KNEE, UNSPECIFIED OSTEOARTHRITIS TYPE: ICD-10-CM

## 2020-09-22 PROCEDURE — 97110 THERAPEUTIC EXERCISES: CPT | Performed by: PHYSICAL THERAPIST

## 2020-09-22 PROCEDURE — 97161 PT EVAL LOW COMPLEX 20 MIN: CPT | Performed by: PHYSICAL THERAPIST

## 2020-09-22 NOTE — PROGRESS NOTES
PT Evaluation     Today's date: 3/2/2021  Patient name: Mildred Carlson  : 1960  MRN: [id number]  Referring provider: Melanie Medrano DO  Dx:   Encounter Diagnosis     ICD-10-CM    1  Right knee pain, unspecified chronicity  M25 561 PT plan of care cert/re-cert   2  Osteoarthritis of right knee, unspecified osteoarthritis type  M17 11 PT plan of care cert/re-cert   3  Primary osteoarthritis of right hip  M16 11 PT plan of care cert/re-cert       Start Time: 1000  Stop Time: 1100  Total time in clinic (min): 60 minutes    Assessment  Assessment details: Pt is a 61 ear old male who presents to outpatient PT with a diagnosis of R hip and knee OA  Pt demonstrates impairments including increased hip pain, decreased hip ROM on R with limited IR and flexion ROM, decreased proximal hip strength R>L, impaired balance and impaired functional mobility with pain and difficulty performing sit to stand, supine to sit and car transfers  Pt will benefit from skilled PT to address above impairments and maximize function  Impairments: abnormal or restricted ROM, activity intolerance, impaired balance, impaired physical strength, lacks appropriate home exercise program and pain with function  Barriers to therapy: Pt only able to attend PT 1x/week secondary to financial restrictions  Understanding of Dx/Px/POC: good   Prognosis: good    Goals  Impairment Goals  -Pt will demonstrate decreased hip pain to 2/10 at worst  -Pt will demonstrate increased hip IR PROM to >25 deg on R and hip flexion AROM to 100 deg on R  -Pt will demonstrated increased LE strength all planes to >4+/5 b/l  Functional Goals  -Pt will demonstrate ability to perform sit to stand transfer without pain  -Pt will demonstrate ability to perform supine to sit transfer without increase in pain      Plan  Plan details: Cont per POC  Pt will only be able to attend PT 1x/week  Update HEP each session as able     Patient would benefit from: skilled physical therapy  Planned modality interventions: low level laser therapy, thermotherapy: hydrocollator packs and cryotherapy  Planned therapy interventions: joint mobilization, manual therapy, neuromuscular re-education, patient education, postural training, strengthening, stretching, therapeutic activities, therapeutic exercise, home exercise program, functional ROM exercises, flexibility, balance and abdominal trunk stabilization  Frequency: 2x week  Duration in weeks: 6  Treatment plan discussed with: patient        Subjective Evaluation    History of Present Illness  Mechanism of injury: R knee pain x1 month  He had a sharp pain in the back of his knee while lying down that lasted for a minute and then subsided  Pt experienced the same pain a few more times  Once when walking and once more when lying  He reports the shooting pain is in the back of his knee  He had x-rays which was (+) for patellofemoral arthritis  Pt then saw ortho and was given a cortisone injection and has had relief of shooting pains in his knee  Pt also has R anterior hip pain that has been present for >25 years that recently flared up 1 month ago as well  He does not recall a specific BIBI and he is not sure if the hip pain began before or after his knee pain  R Knee pain: 0/10 currently  R Hip pain: 0-4/10 ache  Pain is intermittent    Aggravating activities: sit to stand, supine to sit transfers, car transfers   Hx of medial menisectomy on R knee in 1995 and 1996  Pt had a stroke in March 2020 affecting his R side  Pain  Relieving factors: rest  Progression: improved    Social Support  Steps to enter house: yes  Stairs in house: yes   Lives in: multiple-level home  Lives with: sister  Employment status: not working    Diagnostic Tests  X-ray: abnormal  Treatments  Current treatment: physical therapy  Patient Goals  Patient goal: to strengthen my leg        Objective     Palpation     Right   Tenderness of the iliopsoas       Additional Palpation Details  R media kneel joint line pain, R post lateral knee pain, distal biceps femoris insertion    Active Range of Motion   Left Hip   Flexion: 100 degrees     Right Hip   Flexion: 90 degrees     Additional Active Range of Motion Details  L knee flexion 128 deg  R knee flexion 128 deg  Knee ext [facility] b/l     Pop angle 51 deg L, 55 deg R   R hip IR PROM 12 deg, 27 deg on L    Strength/Myotome Testing     Left Hip   Planes of Motion   Flexion: 4  Extension: 4  Abduction: 4  External rotation: 4+  Internal rotation: 4+    Right Hip   Planes of Motion   Flexion: 4-  Extension: 4-  Abduction: 4-  External rotation: 4  Internal rotation: 4+    Additional Strength Details  Ankle DF 4+/5 b/l  Knee flex/ext 4/5 R, 4+/5 L   Quad set fair on R     Tests     Right Hip   Positive MANDI       Additional Tests Details  (+) FADIR     General Comments:      Hip Comments   Able to HR/TR  Able to uni HR x1 on L, unable to elevate through full ROM on R requiring increased HHA  SLS L 16 secs, R 8 secs   Pt unable to squat to parallel, increased anterior translation of knees over toes  Repeat sit to stand 15 57 secs              Precautions: hx of stroke March 2020, HTN, CKD    Daily Treatment Diary    Manuals 9/22         Post R hip mob          R hip inf/lat distraction                              Neuro Re-Ed          SLS          Tandem Stance                                                            Ther Ex          Bike          Supine piri stretch 20"x1 ea         SKC          Seated hs stretch 20"x1 ea         Seated gastroc stretch 20"x1 ea         Seated fig 4 stretch 20"x1 R modified with LLE extended         Bridge with ball squeeze          March          SLR          S/L ABD          Standing hip ABD          Standing SLR          Standing march          HR/TR                              Ther Activity          FSU and over                    Gait Training                              Modalities 03/02/2021: Pt did not return to PT after last appt on 10/14/2020  Goals not formally addressed  Pt d/c'ed from skilled PT

## 2020-09-25 ENCOUNTER — OFFICE VISIT (OUTPATIENT)
Dept: NEUROLOGY | Facility: CLINIC | Age: 60
End: 2020-09-25
Payer: COMMERCIAL

## 2020-09-25 VITALS
HEIGHT: 68 IN | SYSTOLIC BLOOD PRESSURE: 131 MMHG | DIASTOLIC BLOOD PRESSURE: 71 MMHG | TEMPERATURE: 97.3 F | HEART RATE: 57 BPM | BODY MASS INDEX: 36.07 KG/M2 | WEIGHT: 238 LBS

## 2020-09-25 DIAGNOSIS — D68.61 ANTIPHOSPHOLIPID ANTIBODY WITH HYPERCOAGULABLE STATE (HCC): ICD-10-CM

## 2020-09-25 DIAGNOSIS — Z86.73 HISTORY OF STROKE: Primary | ICD-10-CM

## 2020-09-25 DIAGNOSIS — I10 ESSENTIAL HYPERTENSION: ICD-10-CM

## 2020-09-25 DIAGNOSIS — R41.0 CONFUSION: ICD-10-CM

## 2020-09-25 DIAGNOSIS — E78.2 MIXED HYPERLIPIDEMIA: Chronic | ICD-10-CM

## 2020-09-25 DIAGNOSIS — I48.0 PAROXYSMAL ATRIAL FIBRILLATION (HCC): ICD-10-CM

## 2020-09-25 DIAGNOSIS — G47.8 NON-RESTORATIVE SLEEP: ICD-10-CM

## 2020-09-25 PROCEDURE — 99215 OFFICE O/P EST HI 40 MIN: CPT | Performed by: PSYCHIATRY & NEUROLOGY

## 2020-09-25 NOTE — PROGRESS NOTES
Patient ID: Lonza Goldmann is a 61 y o  male  Assessment/Plan:    No problem-specific Assessment & Plan notes found for this encounter  Diagnoses and all orders for this visit:    History of stroke    Paroxysmal atrial fibrillation (HCC)    Essential hypertension    Antiphospholipid antibody with hypercoagulable state ([facility] [address] )    Mixed hyperlipidemia        Patient Instructions   Stroke:  Jorge Piedra presents for follow-up evaluation with regard to his prior stroke  He reports no new symptoms concerning for recurrent TIA or stroke  He takes his medication as prescribed  He has had a few brief episodes of melanotic stools that have been self-limited and is pending a colonoscopy  He continues to have some right leg weakness in the office today and he reports some ongoing left upper extremity rest predominant tremor the latter of which has been present since his stroke in 2014     - for ongoing stroke prevention he should continue his current combination of Eliquis, aspirin, statin, and appropriate blood pressure and glycemic control  - we will defer to the good judgment of his primary care team for monitoring of his cholesterol panel and blood sugar numbers   - I would like him to remain physically active in as much as he feels capable of doing so  - at the time of his last CT angiogram there was no evidence of significant stenosis in his carotid arteries and he is not in need of repeat cerebrovascular imaging   -if he is going to pursue a colonoscopy he would be cleared to do so from a neurologic standpoint provided that he received bridging therapy with either Lovenox or heparin   -he also reports that he is going to see the dentist soon  He should make sure the dentist is aware of his medications prior to beginning any cleaning  Non restorative sleep:  Reportedly he might snore, and he does clearly experience non restored her sleep and may even have some excessive daytime   Fatigue    He has not yet been evaluated for obstructive sleep apnea  - as obstructive sleep apnea is an independent risk factor for heart attack and stroke it is important for us to evaluate that to determine if he requires treatment  I will request a  Home sleep study to be performed  If he is found to have obstructive sleep apnea we will then refer him to the sleep center for additional evaluation and potential treatment  Pain in the legs: He reports that he is experiencing pain in the back of the legs going from the top down the posterior aspect of the leg below the knee  It may affect 1 side or the other  It is not specific to 1 position to another  This is been going on more for the last 6 weeks  He has been evaluated by Orthopedics and he does have potentially some arthritis behind the patella, but ultimately his symptoms are somewhat consistent with sciatic nerve pain  He is currently receiving course of physical therapy for his knee and I would suggest that he review the possibility of sciatica contributing to his leg pain with the physical therapist to that they can adjust what they are doing with him and ensure that he is doing exercises that will help to alleviate that discomfort  From my standpoint he is overall doing very well  I will plan for him to return to the office in 6 months but I would be happy to see him sooner if the need should arise  If he has any symptoms concerning for TIA or stroke including sudden painless loss of vision or double vision, difficulty speaking or swallowing, vertigo/ room spinning that does not quickly resolve, or weakness /numbness affecting 1 side of the face or body he should proceed by ambulance to the nearest emergency room immediately  Subjective:    LORENZA     Lin Castro presents for follow-up evaluation with regard to his recent stroke  He reports no new symptoms concerning for recurrent TIA or stroke  He takes his medication as prescribed    He reports that he has had a few episodes of melanotic stools and he is pending a colonoscopy in the fall although technically it is about 2 years early compared to when he  Would otherwise have needed  Considering his diagnosis of lupus anticoagulant, I would suggest that he should have appropriate bridging therapy  He reports snoring with nonobstructive sleeping chronic fatigue  He has not had a sleep study performed yet  He reports that over the last 6 weeks he is having some increased abnormal sensation in his bilateral legs including episodes of shooting pains going down the leg and back behind the knee  He has been evaluated by orthopedics who feel that he has apparently some arthritis behind the patella at least on 1 side and he received an injection and is going to physical therapy  He reports a rash predominant tremor of his left upper extremity which has been present ever since his stroke in 2014 and is likely going to persist long-term  He also reports episodes of loss of memory   And confusion  At times of last for up to 15 minutes  He may have some difficulty with things like where he is or what is going on around him    I would like him to keep track of these episodes to determine their frequency and severity and he will contact us if they persist       Past Medical History:   Diagnosis Date    Aneurysm of thoracic aorta ([address] )     last assessed 11/20/17    Anxiety     currently on no meds    Cardiac disease     Cholelithiasis     Chronic kidney disease     GERD (gastroesophageal reflux disease)     Headache due to anesthesia     Hyperlipidemia     Hypertension     Irritable bowel syndrome     Kidney stone     Palpitations     PONV (postoperative nausea and vomiting)     Shortness of breath     ? related to acid reflux    Sleep apnea     not sure    Stroke [facility] 11/2014    TIA (transient ischemic attack)        Past Surgical History:   Procedure Laterality Date    AORTA SURGERY      thoracic - aneurysmorrhaphy    APPENDECTOMY      ASCENDING AORTIC ANEURYSM REPAIR      resolved 8/19/15    CHOLECYSTECTOMY      laparoscopic    CYSTOSCOPY      with removal of object- stent removal    KNEE ARTHROSCOPY Right 1996    with medial meniscus repair    LITHOTRIPSY      renal    NV FRAGMENT KIDNEY STONE/ ESWL Left 5/17/2018    Procedure: LITHROTRIPSY EXTRACORPORAL SHOCKWAVE (ESWL);   Surgeon: Flaquita Arroyo MD;  Location: [facility]  MAIN [facility];  Service: Urology    THUMB ARTHROSCOPY Right 1976    ligament repair       Social History     Socioeconomic History    Marital status:      Spouse name: None    Number of children: None    Years of education: None    Highest education level: None   Occupational History    Occupation: disabled     Social Needs    Financial resource strain: None    Food insecurity     Worry: None     Inability: None    Transportation needs     Medical: None     Non-medical: None   Tobacco Use    Smoking status: Never Smoker    Smokeless tobacco: Never Used    Tobacco comment: no second hand smoke exposure   Substance and Sexual Activity    Alcohol use: Not Currently    Drug use: No    Sexual activity: None   Lifestyle    Physical activity     Days per week: None     Minutes per session: None    Stress: None   Relationships    Social connections     Talks on phone: None     Gets together: None     Attends Buddhism service: None     Active member of club or organization: None     Attends meetings of clubs or organizations: None     Relationship status: None    Intimate partner violence     Fear of current or ex partner: None     Emotionally abused: None     Physically abused: None     Forced sexual activity: None   Other Topics Concern    None   Social History Narrative    Caffeine use    Completed some college     as per [employer]       Family History   Problem Relation Age of Onset    Diverticulitis Mother         of colon    Nephrolithiasis Mother    [address] [facility] Emphysema Father     Nephrolithiasis Sister     Heart attack Maternal Grandfather 72    Breast cancer Paternal Grandmother     Lung cancer Paternal Grandfather     Cancer Family     Coronary artery disease Family     Diabetes Family         sibling    Hypertension Family         sibling    Hernia Family         sibling         Current Outpatient Medications:     amLODIPine (NORVASC) 5 mg tablet, Take 1 tablet (5 mg total) by mouth daily, Disp: 90 tablet, Rfl: 1    apixaban (ELIQUIS) 5 mg, Take 1 tablet (5 mg total) by mouth 2 (two) times a day, Disp: 60 tablet, Rfl: 5    aspirin (ECOTRIN LOW STRENGTH) 81 mg EC tablet, Take 1 tablet (81 mg total) by mouth daily, Disp: 30 tablet, Rfl: 0    atorvastatin (LIPITOR) 40 mg tablet, Take 1 tablet (40 mg total) by mouth daily with dinner, Disp: 90 tablet, Rfl: 1    cholecalciferol (VITAMIN D3) 1,000 units tablet, Take 1 tablet (1,000 Units total) by mouth daily, Disp: 30 tablet, Rfl: 5    metoprolol succinate (TOPROL-XL) 50 mg 24 hr tablet, TAKE 3 TABLETS BY MOUTH EVERY DAY, Disp: 270 tablet, Rfl: 1    pantoprazole (PROTONIX) 40 mg tablet, Take 1 tablet (40 mg total) by mouth daily, Disp: 90 tablet, Rfl: 1    Allergies   Allergen Reactions    Losartan Angioedema    Bactrim [Sulfamethoxazole-Trimethoprim]     Lisinopril      Felt bad, was OK with Zestril brand name   Tramadol         Blood pressure 131/71, pulse 57, temperature (!) 97 3 °F (36 3 °C), height 5' 8" (1 727 m), weight 108 kg (238 lb)  The following portions of the patient's history were reviewed: allergies, current medications, past family history, past medical history, past surgical history, past social history and problem list        Objective:    Blood pressure 131/71, pulse 57, temperature (!) 97 3 °F (36 3 °C), height 5' 8" (1 727 m), weight 108 kg (238 lb)  Physical Exam    Neurological Exam      At the time of my evaluation  He was awake, alert, and in no distress    There were no obvious cranial neuropathies  Strength in the hip flexors, quadriceps, hamstrings   Was intact but slightly diminished on the right compared with the left which is likely residual from his prior stroke  Deep tendon reflexes were 2+ but exaggerated on the right at the level of the patella and Achilles compared to the left  His gait is stable  He does have a mild rest predominant left upper extremity tremor but no other clear signs of parkinsonism    ROS:    Review of Systems   Constitutional: Positive for fatigue (Always tired)  Negative for appetite change and fever  HENT: Negative for hearing loss, tinnitus, trouble swallowing and voice change  Throat is swollen     Eyes: Negative  Negative for photophobia and pain  Respiratory: Negative  Negative for shortness of breath  Cardiovascular: Negative  Negative for palpitations  Gastrointestinal: Negative  Negative for nausea and vomiting  Endocrine: Negative  Negative for cold intolerance  Genitourinary: Negative  Negative for dysuria, frequency and urgency  Musculoskeletal: Positive for myalgias (legs)  Negative for neck pain  Sometimes has balance issues     Skin: Negative  Negative for rash  Allergic/Immunologic: Negative  Neurological: Positive for tremors, weakness (a little bit in the legs) and numbness (at times in hands)  Negative for dizziness, seizures, syncope, facial asymmetry, speech difficulty, light-headedness and headaches  Hematological: Bruises/bleeds easily (sometimes bleeds easy)  Psychiatric/Behavioral: Negative  Negative for confusion, hallucinations and sleep disturbance  All other systems reviewed and are negative  Reviewed ROS as entered by medical assistant        I have spent 45 minutes with Patient  today in which greater than 50% of this time was spent in counseling/coordination of care regarding Risks and benefits of tx options, Intructions for management, Patient and family education, Risk factor reductions and Impressions

## 2020-09-25 NOTE — PATIENT INSTRUCTIONS
Stroke:  Mahesh Lisa presents for follow-up evaluation with regard to his prior stroke  He reports no new symptoms concerning for recurrent TIA or stroke  He takes his medication as prescribed  He has had a few brief episodes of melanotic stools that have been self-limited and is pending a colonoscopy  He continues to have some right leg weakness in the office today and he reports some ongoing left upper extremity rest predominant tremor the latter of which has been present since his stroke in 2014     - for ongoing stroke prevention he should continue his current combination of Eliquis, aspirin, statin, and appropriate blood pressure and glycemic control  - we will defer to the good judgment of his primary care team for monitoring of his cholesterol panel and blood sugar numbers   - I would like him to remain physically active in as much as he feels capable of doing so  - at the time of his last CT angiogram there was no evidence of significant stenosis in his carotid arteries and he is not in need of repeat cerebrovascular imaging   -if he is going to pursue a colonoscopy he would be cleared to do so from a neurologic standpoint provided that he received bridging therapy with either Lovenox or heparin   -he also reports that he is going to see the dentist soon  He should make sure the dentist is aware of his medications prior to beginning any cleaning  Non restorative sleep:  Reportedly he might snore, and he does clearly experience non restored her sleep and may even have some excessive daytime   Fatigue  He has not yet been evaluated for obstructive sleep apnea  - as obstructive sleep apnea is an independent risk factor for heart attack and stroke it is important for us to evaluate that to determine if he requires treatment  I will request a  Home sleep study to be performed    If he is found to have obstructive sleep apnea we will then refer him to the sleep center for additional evaluation and potential treatment  Pain in the legs: He reports that he is experiencing pain in the back of the legs going from the top down the posterior aspect of the leg below the knee  It may affect 1 side or the other  It is not specific to 1 position to another  This is been going on more for the last 6 weeks  He has been evaluated by Orthopedics and he does have potentially some arthritis behind the patella, but ultimately his symptoms are somewhat consistent with sciatic nerve pain  He is currently receiving course of physical therapy for his knee and I would suggest that he review the possibility of sciatica contributing to his leg pain with the physical therapist to that they can adjust what they are doing with him and ensure that he is doing exercises that will help to alleviate that discomfort  From my standpoint he is overall doing very well  I will plan for him to return to the office in 6 months but I would be happy to see him sooner if the need should arise  If he has any symptoms concerning for TIA or stroke including sudden painless loss of vision or double vision, difficulty speaking or swallowing, vertigo/ room spinning that does not quickly resolve, or weakness /numbness affecting 1 side of the face or body he should proceed by ambulance to the nearest emergency room immediately

## 2020-09-29 ENCOUNTER — APPOINTMENT (OUTPATIENT)
Dept: PHYSICAL THERAPY | Facility: REHABILITATION | Age: 60
End: 2020-09-29
Payer: COMMERCIAL

## 2020-09-30 ENCOUNTER — REMOTE DEVICE CLINIC VISIT (OUTPATIENT)
Dept: CARDIOLOGY CLINIC | Facility: CLINIC | Age: 60
End: 2020-09-30
Payer: COMMERCIAL

## 2020-09-30 ENCOUNTER — APPOINTMENT (OUTPATIENT)
Dept: PHYSICAL THERAPY | Facility: REHABILITATION | Age: 60
End: 2020-09-30
Payer: COMMERCIAL

## 2020-09-30 DIAGNOSIS — Z95.818 PRESENCE OF OTHER CARDIAC IMPLANTS AND GRAFTS: Primary | ICD-10-CM

## 2020-09-30 PROCEDURE — G2066 INTER DEVC REMOTE 30D: HCPCS | Performed by: INTERNAL MEDICINE

## 2020-09-30 PROCEDURE — 93298 REM INTERROG DEV EVAL SCRMS: CPT | Performed by: INTERNAL MEDICINE

## 2020-09-30 NOTE — PROGRESS NOTES
CARELINK TRANSMISSION: LOOP RECORDER  PRESENTING RHYTHM NSR @ 70 BPM  BATTERY STATUS "OK"  6 AF EPISODES W/ EGRAMS SUGGESTING SR W/ PACs , PVCs AND OVERSENSING  CAN NOT R/O AF  AF BURDEN = 0%  PT TAKES METOPROLOL SUCC, ELIQUIS AND ASA 81   NO PATIENT ACTIVATED EPISODES  NORMAL DEVICE FUNCTION   DL

## 2020-10-01 ENCOUNTER — OFFICE VISIT (OUTPATIENT)
Dept: OBGYN CLINIC | Facility: MEDICAL CENTER | Age: 60
End: 2020-10-01
Payer: COMMERCIAL

## 2020-10-01 VITALS
HEART RATE: 74 BPM | SYSTOLIC BLOOD PRESSURE: 142 MMHG | BODY MASS INDEX: 36.07 KG/M2 | WEIGHT: 238 LBS | TEMPERATURE: 98.4 F | HEIGHT: 68 IN | DIASTOLIC BLOOD PRESSURE: 84 MMHG

## 2020-10-01 DIAGNOSIS — M17.11 PRIMARY OSTEOARTHRITIS OF RIGHT KNEE: Primary | ICD-10-CM

## 2020-10-01 PROCEDURE — 3077F SYST BP >= 140 MM HG: CPT | Performed by: ORTHOPAEDIC SURGERY

## 2020-10-01 PROCEDURE — 1036F TOBACCO NON-USER: CPT | Performed by: ORTHOPAEDIC SURGERY

## 2020-10-01 PROCEDURE — 3079F DIAST BP 80-89 MM HG: CPT | Performed by: ORTHOPAEDIC SURGERY

## 2020-10-01 PROCEDURE — 99213 OFFICE O/P EST LOW 20 MIN: CPT | Performed by: ORTHOPAEDIC SURGERY

## 2020-10-02 ENCOUNTER — TELEPHONE (OUTPATIENT)
Dept: SLEEP CENTER | Facility: CLINIC | Age: 60
End: 2020-10-02

## 2020-10-06 ENCOUNTER — APPOINTMENT (OUTPATIENT)
Dept: PHYSICAL THERAPY | Facility: REHABILITATION | Age: 60
End: 2020-10-06
Payer: COMMERCIAL

## 2020-10-06 ENCOUNTER — APPOINTMENT (OUTPATIENT)
Dept: LAB | Facility: HOSPITAL | Age: 60
End: 2020-10-06
Attending: INTERNAL MEDICINE
Payer: COMMERCIAL

## 2020-10-06 DIAGNOSIS — D68.61 ANTIPHOSPHOLIPID ANTIBODY WITH HYPERCOAGULABLE STATE (HCC): ICD-10-CM

## 2020-10-06 LAB
BASOPHILS # BLD AUTO: 0.03 THOUSANDS/ΜL (ref 0–0.1)
BASOPHILS NFR BLD AUTO: 0 % (ref 0–1)
D DIMER PPP FEU-MCNC: 1.35 UG/ML FEU
EOSINOPHIL # BLD AUTO: 0.23 THOUSAND/ΜL (ref 0–0.61)
EOSINOPHIL NFR BLD AUTO: 3 % (ref 0–6)
ERYTHROCYTE [DISTWIDTH] IN BLOOD BY AUTOMATED COUNT: 13.4 % (ref 11.6–15.1)
HCT VFR BLD AUTO: 44.1 % (ref 36.5–49.3)
HGB BLD-MCNC: 14.3 G/DL (ref 12–17)
IMM GRANULOCYTES # BLD AUTO: 0.03 THOUSAND/UL (ref 0–0.2)
IMM GRANULOCYTES NFR BLD AUTO: 0 % (ref 0–2)
LYMPHOCYTES # BLD AUTO: 1.45 THOUSANDS/ΜL (ref 0.6–4.47)
LYMPHOCYTES NFR BLD AUTO: 17 % (ref 14–44)
MCH RBC QN AUTO: 30.1 PG (ref 26.8–34.3)
MCHC RBC AUTO-ENTMCNC: 32.4 G/DL (ref 31.4–37.4)
MCV RBC AUTO: 93 FL (ref 82–98)
MONOCYTES # BLD AUTO: 0.52 THOUSAND/ΜL (ref 0.17–1.22)
MONOCYTES NFR BLD AUTO: 6 % (ref 4–12)
NEUTROPHILS # BLD AUTO: 6.1 THOUSANDS/ΜL (ref 1.85–7.62)
NEUTS SEG NFR BLD AUTO: 74 % (ref 43–75)
NRBC BLD AUTO-RTO: 0 /100 WBCS
PLATELET # BLD AUTO: 203 THOUSANDS/UL (ref 149–390)
PMV BLD AUTO: 10 FL (ref 8.9–12.7)
RBC # BLD AUTO: 4.75 MILLION/UL (ref 3.88–5.62)
WBC # BLD AUTO: 8.36 THOUSAND/UL (ref 4.31–10.16)

## 2020-10-06 PROCEDURE — 36415 COLL VENOUS BLD VENIPUNCTURE: CPT

## 2020-10-06 PROCEDURE — 85025 COMPLETE CBC W/AUTO DIFF WBC: CPT

## 2020-10-06 PROCEDURE — 85670 THROMBIN TIME PLASMA: CPT

## 2020-10-06 PROCEDURE — 85613 RUSSELL VIPER VENOM DILUTED: CPT

## 2020-10-06 PROCEDURE — 85705 THROMBOPLASTIN INHIBITION: CPT

## 2020-10-06 PROCEDURE — 85379 FIBRIN DEGRADATION QUANT: CPT

## 2020-10-06 PROCEDURE — 85732 THROMBOPLASTIN TIME PARTIAL: CPT

## 2020-10-07 ENCOUNTER — OFFICE VISIT (OUTPATIENT)
Dept: PHYSICAL THERAPY | Facility: REHABILITATION | Age: 60
End: 2020-10-07
Payer: COMMERCIAL

## 2020-10-07 DIAGNOSIS — M16.11 PRIMARY OSTEOARTHRITIS OF RIGHT HIP: ICD-10-CM

## 2020-10-07 DIAGNOSIS — M25.561 RIGHT KNEE PAIN, UNSPECIFIED CHRONICITY: Primary | ICD-10-CM

## 2020-10-07 DIAGNOSIS — M17.11 OSTEOARTHRITIS OF RIGHT KNEE, UNSPECIFIED OSTEOARTHRITIS TYPE: ICD-10-CM

## 2020-10-07 PROCEDURE — 97110 THERAPEUTIC EXERCISES: CPT

## 2020-10-09 LAB
APTT SCREEN TO CONFIRM RATIO: 1 RATIO (ref 0–1.4)
CONFIRM APTT/NORMAL: 40.7 SEC (ref 0–55)
DRVVT IMM 1:2 NP PPP: 41.2 SEC (ref 0–47)
LA PPP-IMP: ABNORMAL
SCREEN APTT: 38.2 SEC (ref 0–51.9)
SCREEN DRVVT: 54 SEC (ref 0–47)
THROMBIN TIME: 16.3 SEC (ref 0–23)

## 2020-10-13 ENCOUNTER — APPOINTMENT (OUTPATIENT)
Dept: PHYSICAL THERAPY | Facility: REHABILITATION | Age: 60
End: 2020-10-13
Payer: COMMERCIAL

## 2020-10-14 ENCOUNTER — OFFICE VISIT (OUTPATIENT)
Dept: PHYSICAL THERAPY | Facility: REHABILITATION | Age: 60
End: 2020-10-14
Payer: COMMERCIAL

## 2020-10-14 DIAGNOSIS — M25.561 RIGHT KNEE PAIN, UNSPECIFIED CHRONICITY: Primary | ICD-10-CM

## 2020-10-14 DIAGNOSIS — M16.11 PRIMARY OSTEOARTHRITIS OF RIGHT HIP: ICD-10-CM

## 2020-10-14 DIAGNOSIS — M17.11 OSTEOARTHRITIS OF RIGHT KNEE, UNSPECIFIED OSTEOARTHRITIS TYPE: ICD-10-CM

## 2020-10-14 PROCEDURE — 97530 THERAPEUTIC ACTIVITIES: CPT

## 2020-10-14 PROCEDURE — 97110 THERAPEUTIC EXERCISES: CPT

## 2020-10-14 PROCEDURE — 97112 NEUROMUSCULAR REEDUCATION: CPT

## 2020-10-15 ENCOUNTER — TELEPHONE (OUTPATIENT)
Dept: NEUROLOGY | Facility: CLINIC | Age: 60
End: 2020-10-15

## 2020-10-16 ENCOUNTER — TELEPHONE (OUTPATIENT)
Dept: UROLOGY | Facility: CLINIC | Age: 60
End: 2020-10-16

## 2020-10-16 DIAGNOSIS — R10.9 FLANK PAIN: Primary | ICD-10-CM

## 2020-10-19 ENCOUNTER — TELEPHONE (OUTPATIENT)
Dept: UROLOGY | Facility: CLINIC | Age: 60
End: 2020-10-19

## 2020-10-19 ENCOUNTER — LAB (OUTPATIENT)
Dept: LAB | Facility: HOSPITAL | Age: 60
End: 2020-10-19
Payer: COMMERCIAL

## 2020-10-19 DIAGNOSIS — R10.9 FLANK PAIN: ICD-10-CM

## 2020-10-19 LAB
BACTERIA UR QL AUTO: ABNORMAL /HPF
BILIRUB UR QL STRIP: NEGATIVE
CLARITY UR: CLEAR
COLOR UR: YELLOW
GLUCOSE UR STRIP-MCNC: NEGATIVE MG/DL
HGB UR QL STRIP.AUTO: NEGATIVE
KETONES UR STRIP-MCNC: NEGATIVE MG/DL
LEUKOCYTE ESTERASE UR QL STRIP: NEGATIVE
NITRITE UR QL STRIP: NEGATIVE
NON-SQ EPI CELLS URNS QL MICRO: ABNORMAL /HPF
PH UR STRIP.AUTO: 7.5 [PH]
PROT UR STRIP-MCNC: NEGATIVE MG/DL
RBC #/AREA URNS AUTO: ABNORMAL /HPF
SP GR UR STRIP.AUTO: 1.01 (ref 1–1.03)
UROBILINOGEN UR QL STRIP.AUTO: 0.2 E.U./DL
WBC #/AREA URNS AUTO: ABNORMAL /HPF

## 2020-10-19 PROCEDURE — 81001 URINALYSIS AUTO W/SCOPE: CPT | Performed by: PHYSICIAN ASSISTANT

## 2020-10-19 PROCEDURE — 87086 URINE CULTURE/COLONY COUNT: CPT

## 2020-10-20 ENCOUNTER — APPOINTMENT (OUTPATIENT)
Dept: PHYSICAL THERAPY | Facility: REHABILITATION | Age: 60
End: 2020-10-20
Payer: COMMERCIAL

## 2020-10-20 LAB — BACTERIA UR CULT: NORMAL

## 2020-10-21 ENCOUNTER — APPOINTMENT (OUTPATIENT)
Dept: PHYSICAL THERAPY | Facility: REHABILITATION | Age: 60
End: 2020-10-21
Payer: COMMERCIAL

## 2020-10-23 ENCOUNTER — HOSPITAL ENCOUNTER (OUTPATIENT)
Dept: CT IMAGING | Facility: HOSPITAL | Age: 60
Discharge: HOME | End: 2020-10-23
Payer: COMMERCIAL

## 2020-10-23 DIAGNOSIS — R10.9 FLANK PAIN: ICD-10-CM

## 2020-10-23 PROCEDURE — 74176 CT ABD & PELVIS W/O CONTRAST: CPT

## 2020-10-23 PROCEDURE — G1004 CDSM NDSC: HCPCS

## 2020-10-26 ENCOUNTER — HOSPITAL ENCOUNTER (OUTPATIENT)
Dept: SLEEP CENTER | Facility: CLINIC | Age: 60
Discharge: HOME | End: 2020-10-26
Payer: COMMERCIAL

## 2020-10-26 ENCOUNTER — TELEPHONE (OUTPATIENT)
Dept: HEMATOLOGY ONCOLOGY | Facility: CLINIC | Age: 60
End: 2020-10-26

## 2020-10-26 DIAGNOSIS — G47.8 NON-RESTORATIVE SLEEP: ICD-10-CM

## 2020-10-26 PROCEDURE — G0399 HOME SLEEP TEST/TYPE 3 PORTA: HCPCS

## 2020-10-27 ENCOUNTER — OFFICE VISIT (OUTPATIENT)
Dept: HEMATOLOGY ONCOLOGY | Facility: CLINIC | Age: 60
End: 2020-10-27
Payer: COMMERCIAL

## 2020-10-27 VITALS
OXYGEN SATURATION: 97 % | SYSTOLIC BLOOD PRESSURE: 132 MMHG | RESPIRATION RATE: 18 BRPM | BODY MASS INDEX: 37.25 KG/M2 | HEART RATE: 57 BPM | DIASTOLIC BLOOD PRESSURE: 80 MMHG | TEMPERATURE: 97.8 F | HEIGHT: 68 IN | WEIGHT: 245.8 LBS

## 2020-10-27 DIAGNOSIS — I48.0 PAROXYSMAL ATRIAL FIBRILLATION (HCC): Primary | ICD-10-CM

## 2020-10-27 DIAGNOSIS — D68.61 ANTIPHOSPHOLIPID ANTIBODY WITH HYPERCOAGULABLE STATE (HCC): ICD-10-CM

## 2020-10-27 DIAGNOSIS — Z79.01 ANTICOAGULATED BY ANTICOAGULATION TREATMENT: ICD-10-CM

## 2020-10-27 PROCEDURE — 99213 OFFICE O/P EST LOW 20 MIN: CPT | Performed by: PHYSICIAN ASSISTANT

## 2020-10-27 PROCEDURE — 3079F DIAST BP 80-89 MM HG: CPT | Performed by: PHYSICIAN ASSISTANT

## 2020-10-27 PROCEDURE — 3075F SYST BP GE 130 - 139MM HG: CPT | Performed by: PHYSICIAN ASSISTANT

## 2020-10-28 ENCOUNTER — OFFICE VISIT (OUTPATIENT)
Dept: CARDIOLOGY CLINIC | Facility: CLINIC | Age: 60
End: 2020-10-28
Payer: COMMERCIAL

## 2020-10-28 VITALS
WEIGHT: 241.2 LBS | SYSTOLIC BLOOD PRESSURE: 120 MMHG | HEIGHT: 68 IN | OXYGEN SATURATION: 97 % | DIASTOLIC BLOOD PRESSURE: 70 MMHG | BODY MASS INDEX: 36.56 KG/M2 | HEART RATE: 57 BPM | TEMPERATURE: 97 F

## 2020-10-28 DIAGNOSIS — I48.0 PAF (PAROXYSMAL ATRIAL FIBRILLATION) (HCC): Primary | ICD-10-CM

## 2020-10-28 DIAGNOSIS — I10 BENIGN ESSENTIAL HTN: ICD-10-CM

## 2020-10-28 DIAGNOSIS — E78.5 DYSLIPIDEMIA: ICD-10-CM

## 2020-10-28 PROCEDURE — 3008F BODY MASS INDEX DOCD: CPT | Performed by: INTERNAL MEDICINE

## 2020-10-28 PROCEDURE — 99214 OFFICE O/P EST MOD 30 MIN: CPT | Performed by: INTERNAL MEDICINE

## 2020-11-02 DIAGNOSIS — I48.0 PAROXYSMAL ATRIAL FIBRILLATION (HCC): Primary | ICD-10-CM

## 2020-11-04 ENCOUNTER — TELEPHONE (OUTPATIENT)
Dept: SLEEP CENTER | Facility: CLINIC | Age: 60
End: 2020-11-04

## 2020-11-20 DIAGNOSIS — G47.33 OSA (OBSTRUCTIVE SLEEP APNEA): Primary | ICD-10-CM

## 2020-12-30 ENCOUNTER — REMOTE DEVICE CLINIC VISIT (OUTPATIENT)
Dept: CARDIOLOGY CLINIC | Facility: CLINIC | Age: 60
End: 2020-12-30
Payer: COMMERCIAL

## 2020-12-30 DIAGNOSIS — Z95.818 PRESENCE OF OTHER CARDIAC IMPLANTS AND GRAFTS: Primary | ICD-10-CM

## 2020-12-30 PROCEDURE — 93298 REM INTERROG DEV EVAL SCRMS: CPT | Performed by: INTERNAL MEDICINE

## 2020-12-30 PROCEDURE — G2066 INTER DEVC REMOTE 30D: HCPCS | Performed by: INTERNAL MEDICINE

## 2021-01-14 DIAGNOSIS — I10 ESSENTIAL HYPERTENSION: Chronic | ICD-10-CM

## 2021-01-14 RX ORDER — METOPROLOL SUCCINATE 50 MG/1
TABLET, EXTENDED RELEASE ORAL
Qty: 270 TABLET | Refills: 1 | Status: SHIPPED | OUTPATIENT
Start: 2021-01-14 | End: 2021-07-12

## 2021-01-25 ENCOUNTER — OFFICE VISIT (OUTPATIENT)
Dept: FAMILY MEDICINE CLINIC | Facility: CLINIC | Age: 61
End: 2021-01-25
Payer: COMMERCIAL

## 2021-01-25 VITALS
HEIGHT: 68 IN | DIASTOLIC BLOOD PRESSURE: 84 MMHG | HEART RATE: 80 BPM | WEIGHT: 260 LBS | SYSTOLIC BLOOD PRESSURE: 140 MMHG | TEMPERATURE: 97.5 F | BODY MASS INDEX: 39.4 KG/M2

## 2021-01-25 DIAGNOSIS — E78.2 MIXED HYPERLIPIDEMIA: Chronic | ICD-10-CM

## 2021-01-25 DIAGNOSIS — N20.0 KIDNEY STONES: ICD-10-CM

## 2021-01-25 DIAGNOSIS — R10.32 LEFT LOWER QUADRANT ABDOMINAL PAIN: ICD-10-CM

## 2021-01-25 DIAGNOSIS — I10 ESSENTIAL HYPERTENSION: Primary | ICD-10-CM

## 2021-01-25 DIAGNOSIS — I48.0 PAROXYSMAL ATRIAL FIBRILLATION (HCC): ICD-10-CM

## 2021-01-25 PROBLEM — R10.9 ABDOMINAL PAIN: Status: ACTIVE | Noted: 2021-01-25

## 2021-01-25 LAB
SL AMB  POCT GLUCOSE, UA: NORMAL
SL AMB LEUKOCYTE ESTERASE,UA: NORMAL
SL AMB POCT BILIRUBIN,UA: NORMAL
SL AMB POCT BLOOD,UA: NORMAL
SL AMB POCT CLARITY,UA: CLEAR
SL AMB POCT COLOR,UA: YELLOW
SL AMB POCT KETONES,UA: NORMAL
SL AMB POCT NITRITE,UA: NORMAL
SL AMB POCT PH,UA: 6.5
SL AMB POCT SPECIFIC GRAVITY,UA: 1.02
SL AMB POCT URINE PROTEIN: NORMAL
SL AMB POCT UROBILINOGEN: 1

## 2021-01-25 PROCEDURE — 87086 URINE CULTURE/COLONY COUNT: CPT | Performed by: FAMILY MEDICINE

## 2021-01-25 PROCEDURE — 3077F SYST BP >= 140 MM HG: CPT | Performed by: FAMILY MEDICINE

## 2021-01-25 PROCEDURE — 1036F TOBACCO NON-USER: CPT | Performed by: FAMILY MEDICINE

## 2021-01-25 PROCEDURE — 81002 URINALYSIS NONAUTO W/O SCOPE: CPT | Performed by: FAMILY MEDICINE

## 2021-01-25 PROCEDURE — 99214 OFFICE O/P EST MOD 30 MIN: CPT | Performed by: FAMILY MEDICINE

## 2021-01-25 PROCEDURE — 3079F DIAST BP 80-89 MM HG: CPT | Performed by: FAMILY MEDICINE

## 2021-01-25 PROCEDURE — 3008F BODY MASS INDEX DOCD: CPT | Performed by: FAMILY MEDICINE

## 2021-01-25 NOTE — ASSESSMENT & PLAN NOTE
Stable  Blood pressure today was slightly elevated, but in the past it has been better  Because of this, no changes are recommended

## 2021-01-25 NOTE — PROGRESS NOTES
Assessment and Plan:    Problem List Items Addressed This Visit     Abdominal pain     Patient is having some abdominal pain, with some changes in urination  Recommend check ultrasound as urinalysis did not show any findings consistent with stone  Relevant Orders    POCT urine dip (Completed)    US abdomen complete    Atrial fibrillation (HCC)     Stable on current medications including anticoagulation  No changes  Hyperlipidemia (Chronic)     Stable  Continue on Lipitor  Check as scheduled  Hypertension - Primary     Stable  Blood pressure today was slightly elevated, but in the past it has been better  Because of this, no changes are recommended  Kidney stones     Patient has history of kidney stones, however he feels quite good at the moment  There is some flank pain on the left, which could be related  Check ultrasound  Relevant Orders    US abdomen complete                 Diagnoses and all orders for this visit:    Essential hypertension    Mixed hyperlipidemia    Paroxysmal atrial fibrillation (HCC)    Left lower quadrant abdominal pain  -     POCT urine dip  -     US abdomen complete; Future    Kidney stones  -     US abdomen complete; Future              Subjective:      Patient ID: Julia Humphries is a 61 y o  male  CC:    Chief Complaint   Patient presents with    Abdominal Pain     c/o pain in LLQ for 8 days  He is wondering if it's a kidney stone  He has hx of this  Slight constipation  mjs       HPI:    Left side pain, lower abdomen  Comes and goes  It is more on the side than in the abdomen  Mostly in the morning  Sometimes in the evening  He has been increasing his water intake with this starting, as he was concerned that it could be a kidney stone  Denies any radiation of the pain  Has not noticed any changes in urination lately  The urine itself does not seem to be any different either      He did notice that sometimes he has a large volume of urine, and other times the stream strength is not quite so much  Hypertension:  Patient is on amlodipine 5 mg, and metoprolol  Noted that his blood pressure has been slightly elevated lately  Atrial fibrillation:  Currently on Eliquis, as well as metoprolol for heart rate  Hyperlipidemia:  Using a atorvastatin  The following portions of the patient's history were reviewed and updated as appropriate: allergies, current medications, past family history, past medical history, past social history, past surgical history and problem list       Review of Systems   Constitutional: Negative  HENT: Negative  Eyes: Negative  Respiratory: Negative  Cardiovascular: Negative  Gastrointestinal: Negative  Endocrine: Negative  Genitourinary: Negative  Musculoskeletal: Negative  Skin: Negative  Allergic/Immunologic: Negative  Neurological: Negative  Hematological: Negative  Psychiatric/Behavioral: Negative  Data to review:       Objective:    Vitals:    01/25/21 1314   BP: 140/84   Pulse: 80   Temp: 97 5 °F (36 4 °C)   Weight: 118 kg (260 lb)   Height: 5' 8" (1 727 m)        Physical Exam  Vitals signs and nursing note reviewed  Constitutional:       Appearance: Normal appearance  Neck:      Vascular: No carotid bruit  Cardiovascular:      Rate and Rhythm: Normal rate and regular rhythm  Pulses: Normal pulses  Carotid pulses are 2+ on the right side and 2+ on the left side  Heart sounds: Normal heart sounds  No murmur  No gallop  Pulmonary:      Effort: Pulmonary effort is normal  No respiratory distress  Breath sounds: Normal breath sounds  No stridor  No wheezing, rhonchi or rales  Chest:      Chest wall: No tenderness  Abdominal:      General: Abdomen is protuberant  Bowel sounds are normal       Palpations: Abdomen is soft  There is no shifting dullness, hepatomegaly or mass  Tenderness:  There is no abdominal tenderness  Hernia: There is no hernia in the umbilical area or ventral area  Neurological:      Mental Status: He is alert  BMI Counseling: Body mass index is 39 53 kg/m²  The BMI is above normal  Nutrition recommendations include decreasing portion sizes, encouraging healthy choices of fruits and vegetables, decreasing fast food intake, consuming healthier snacks, limiting drinks that contain sugar, moderation in carbohydrate intake, increasing intake of lean protein, reducing intake of saturated and trans fat and reducing intake of cholesterol  Exercise recommendations include exercising 3-5 times per week  No pharmacotherapy was ordered

## 2021-01-25 NOTE — ASSESSMENT & PLAN NOTE
Patient is having some abdominal pain, with some changes in urination  Recommend check ultrasound as urinalysis did not show any findings consistent with stone

## 2021-01-25 NOTE — ASSESSMENT & PLAN NOTE
Patient has history of kidney stones, however he feels quite good at the moment  There is some flank pain on the left, which could be related  Check ultrasound

## 2021-01-25 NOTE — PATIENT INSTRUCTIONS
Problem List Items Addressed This Visit     Abdominal pain     Patient is having some abdominal pain, with some changes in urination  Recommend check ultrasound as urinalysis did not show any findings consistent with stone  Relevant Orders    POCT urine dip (Completed)    US abdomen complete    Atrial fibrillation (HCC)     Stable on current medications including anticoagulation  No changes  Hyperlipidemia (Chronic)     Stable  Continue on Lipitor  Check as scheduled  Hypertension - Primary     Stable  Blood pressure today was slightly elevated, but in the past it has been better  Because of this, no changes are recommended  Kidney stones     Patient has history of kidney stones, however he feels quite good at the moment  There is some flank pain on the left, which could be related  Check ultrasound  Relevant Orders    US abdomen complete          COVID 19 Instructions    Hans Christina 22 was advised to limit contact with others to essential tasks such as getting food, medications, and medical care  Proper handwashing reviewed, and Hand sanitzer when washing is not available  If the patient develops symptoms of COVID 19, the patient should call the office as soon as possible  For [id number] Flu season, it is strongly recommended that Flu Vaccinations be obtained  Please try to download [employer]  Once you do download this on your phone, you will be prompted to add your phone number to the account  After that, he should receive a text from "[employer] and use that code to verify your phone number  After that, you should be able to use [employer] Du[employer] to receive and make video calls  Please download [employer] Teams to your phone or computer  We will be transitioning to this platform for Video Visits  Instructions for downloading this are available from the office      We are committed to getting you vaccinated as soon as possible and will be closely following CDC and [facility]  guidelines as they are released and revised  Please refer to our COVID-19 vaccine webpage for the most up to date information on the vaccine and our distribution efforts      Jose gallegos

## 2021-01-26 ENCOUNTER — APPOINTMENT (OUTPATIENT)
Dept: LAB | Facility: HOSPITAL | Age: 61
End: 2021-01-26
Payer: COMMERCIAL

## 2021-01-26 ENCOUNTER — HOSPITAL ENCOUNTER (OUTPATIENT)
Dept: ULTRASOUND IMAGING | Facility: HOSPITAL | Age: 61
Discharge: HOME | End: 2021-01-26
Payer: COMMERCIAL

## 2021-01-26 DIAGNOSIS — E78.2 MIXED HYPERLIPIDEMIA: Chronic | ICD-10-CM

## 2021-01-26 DIAGNOSIS — I10 ESSENTIAL HYPERTENSION: ICD-10-CM

## 2021-01-26 DIAGNOSIS — R10.32 LEFT LOWER QUADRANT ABDOMINAL PAIN: ICD-10-CM

## 2021-01-26 DIAGNOSIS — R74.8 ALKALINE PHOSPHATASE ELEVATION: ICD-10-CM

## 2021-01-26 DIAGNOSIS — N20.0 KIDNEY STONES: ICD-10-CM

## 2021-01-26 DIAGNOSIS — E80.6 HYPERBILIRUBINEMIA: ICD-10-CM

## 2021-01-26 LAB
ALBUMIN SERPL BCP-MCNC: 3.7 G/DL (ref 3.5–5)
ALP SERPL-CCNC: 160 U/L (ref 46–116)
ALT SERPL W P-5'-P-CCNC: 48 U/L (ref 12–78)
ANION GAP SERPL CALCULATED.3IONS-SCNC: 7 MMOL/L (ref 4–13)
AST SERPL W P-5'-P-CCNC: 23 U/L (ref 5–45)
BACTERIA UR CULT: NORMAL
BILIRUB SERPL-MCNC: 1.66 MG/DL (ref 0.2–1)
BUN SERPL-MCNC: 12 MG/DL (ref 5–25)
CALCIUM SERPL-MCNC: 9 MG/DL (ref 8.3–10.1)
CHLORIDE SERPL-SCNC: 103 MMOL/L (ref 100–108)
CHOLEST SERPL-MCNC: 111 MG/DL (ref 50–200)
CO2 SERPL-SCNC: 32 MMOL/L (ref 21–32)
CREAT SERPL-MCNC: 1.11 MG/DL (ref 0.6–1.3)
GFR SERPL CREATININE-BSD FRML MDRD: 72 ML/MIN/1.73SQ M
GLUCOSE P FAST SERPL-MCNC: 118 MG/DL (ref 65–99)
HDLC SERPL-MCNC: 37 MG/DL
LDLC SERPL DIRECT ASSAY-MCNC: 66 MG/DL (ref 0–100)
POTASSIUM SERPL-SCNC: 3.7 MMOL/L (ref 3.5–5.3)
PROT SERPL-MCNC: 7.3 G/DL (ref 6.4–8.2)
SODIUM SERPL-SCNC: 142 MMOL/L (ref 136–145)

## 2021-01-26 PROCEDURE — 76700 US EXAM ABDOM COMPLETE: CPT

## 2021-01-26 PROCEDURE — 36415 COLL VENOUS BLD VENIPUNCTURE: CPT

## 2021-01-26 PROCEDURE — 83718 ASSAY OF LIPOPROTEIN: CPT

## 2021-01-26 PROCEDURE — 80053 COMPREHEN METABOLIC PANEL: CPT

## 2021-01-26 PROCEDURE — 82465 ASSAY BLD/SERUM CHOLESTEROL: CPT

## 2021-01-26 PROCEDURE — 83721 ASSAY OF BLOOD LIPOPROTEIN: CPT

## 2021-02-19 NOTE — PROGRESS NOTES
Assessment & Plan:     K58 9 Irritable bowel syndrome  I have evaluated the patient and found the condition to be: Stable  I have evaluated the patient and: Recommended continue with same treatment plan    K21 9 GERD (gastroesophageal reflux disease)  I have evaluated the patient and found the condition to be: Stable  I have evaluated the patient and: Recommended continue with same treatment plan    I48 91 [facility]  I have evaluated the patient and found the condition to be: Stable  I have evaluated the patient and: Recommended continue with same treatment plan    I35 0 Aortic valve stenosis  I have evaluated the patient and found the condition to be: Stable  I have evaluated the patient and: Recommended continue with same treatment plan    G47 33 CORIE (obstructive sleep apnea)  I have evaluated the patient and found the condition to be: Stable  I have evaluated the patient and: Recommended continue with same treatment plan    D68 61 Antiphospholipid antibody with hypercoagulable state ([facility] )  I have evaluated the patient and found the condition to be: Stable  I have evaluated the patient and: Recommended continue with same treatment plan    E55 9 Vitamin D deficiency  I have evaluated the patient and found the condition to be: Stable  I have evaluated the patient and: Recommended continue with same treatment plan  The patient should continue treatment and follow-up with: Check labs  F41 9 Anxiety disorder  I have evaluated the patient and found the condition to be: Stable  I have evaluated the patient and: Recommended continue with same treatment plan    I10 Hypertension  I have evaluated the patient and found the condition to be: Stable  I have evaluated the patient and: Recommended continue with same treatment plan    N48 6 Peyronie disease  I have evaluated the patient and found the condition to be:  Stable  I have evaluated the patient and: Recommended continue with same treatment plan Problem List Items Addressed This Visit     Alkaline phosphatase elevation     Isoenzymes in 2020 were essentially normal   Alk-phos remains somewhat elevated  Continue to observe  Check with next blood work  Relevant Orders    Comprehensive metabolic panel    Alkaline phosphatase, isoenzymes    Aortic valve stenosis - Primary     Stable  Mild murmur noted today  Has follow-up with Cardiology  Atrial fibrillation (HCC)     No abnormalities noted today  Regular heart rate today  Continues on Eliquis and metoprolol  Constipation     Patient does have constipation issues  It is reasonable for him to add fiber supplementation, Colace, MiraLax  He wondered specifically about Colace, which is very reasonable to take every day  He has increased water intake, so I would encourage him to continue that  Relevant Medications    docusate sodium (COLACE) 100 mg capsule    GERD (gastroesophageal reflux disease)     Reflux seems to be doing relatively well  Continues on Protonix, without particular issues  H/O aortic aneurysm repair (Chronic)     Stable  Follow with CT surgery in the future as needed  Follow with Cardiology  Hyperbilirubinemia     Bilirubin has been elevated more recently  This bilirubin was minimally elevated today  Check with next blood work in approximately 6 months  Continue to observe  Relevant Orders    Comprehensive metabolic panel    Hyperlipidemia (Chronic)     Cholesterol seems to be doing extremely well  HDL is slightly lower than goal, but the remainder of the blood work is fantastic  Check in 6 months  Continue on Lipitor  Relevant Orders    Comprehensive metabolic panel    Lipid panel    Hypertension     Blood pressure today was excellent  Continue current treatment  Recheck in approximately 6 months  Relevant Orders    Comprehensive metabolic panel    Obesity     BMI is elevated    Would recommend increase exercise, limit carbs, follow-up as scheduled  Relevant Orders    Comprehensive metabolic panel    Vitamin D deficiency     Check vitamin-D in the future  Relevant Orders    Vitamin D 25 hydroxy            Subjective:     Patient ID: Lenin Kirkland is a 64 y o  male     Chief Complaint   Patient presents with    Follow-up     Follow up abdominal pain - review ultrasound results  [facility]      Patient is here to follow-up on several issues  Hypertension:  Currently on amlodipine, metoprolol  Denies any current problems  Hyperlipidemia:  Currently on atorvastatin 40 mg  Atrial fibrillation:  Currently on Eliquis  Aortic stenosis:  Patient is following with Cardiology  Thoracic aneurysm:  Status post repair  Constipation:  Patient does report that is somewhat better than what it was  Still does have issues at times  He has been increasing fiber in his diet with fruits and vegetables  He was on Colace before in hospital   Reviewed intake of water  Abdominal discomfort:  Patient did have ultrasound done  It was essentially normal  Pain is better now  He noted increased pain with shoveling  Anxiety:  Patient reports he is doing relatively well  Minimal problems at the moment  Not currently on medications  Tremors: Still present on left hand  He is functioning, just has rest tremor  Saw neuro before  Was post CVA  Patient does report that he gets some neck pain on occasion  It is when he turns his head certain ways that it he notes it going from right to left  It is always anterior  Is a sharp fleeting pain  Review of Systems   Constitutional: Negative  HENT: Negative  Eyes: Negative  Respiratory: Negative  Cardiovascular: Negative  Gastrointestinal: Negative  Endocrine: Negative  Genitourinary: Negative  Musculoskeletal: Negative  Skin: Negative  Allergic/Immunologic: Negative  Neurological: Negative  Hematological: Negative  Psychiatric/Behavioral: Negative  Labs reviewed  Sodium 142, potassium 3 7, calcium 9 0  Creatinine 1 11, GFR 72  Blood sugar 118  AST 23, ALT 48  Alk-phos 160  Bilirubin 1 66  Total cholesterol 111, LDL 66, HDL 37  Objective:      /82 (BP Location: Left arm, Patient Position: Sitting, Cuff Size: Large)   Pulse 64   Temp 98 6 °F (37 °C) (Oral)   Ht 5' 8" (1 727 m)   Wt 116 kg (256 lb)   BMI 38 92 kg/m²     Problem List Items Addressed This Visit     Alkaline phosphatase elevation     Isoenzymes in 2020 were essentially normal   Alk-phos remains somewhat elevated  Continue to observe  Check with next blood work  Relevant Orders    Comprehensive metabolic panel    Alkaline phosphatase, isoenzymes    Aortic valve stenosis - Primary     Stable  Mild murmur noted today  Has follow-up with Cardiology  Atrial fibrillation (HCC)     No abnormalities noted today  Regular heart rate today  Continues on Eliquis and metoprolol  Constipation     Patient does have constipation issues  It is reasonable for him to add fiber supplementation, Colace, MiraLax  He wondered specifically about Colace, which is very reasonable to take every day  He has increased water intake, so I would encourage him to continue that  Relevant Medications    docusate sodium (COLACE) 100 mg capsule    GERD (gastroesophageal reflux disease)     Reflux seems to be doing relatively well  Continues on Protonix, without particular issues  H/O aortic aneurysm repair (Chronic)     Stable  Follow with CT surgery in the future as needed  Follow with Cardiology  Hyperbilirubinemia     Bilirubin has been elevated more recently  This bilirubin was minimally elevated today  Check with next blood work in approximately 6 months  Continue to observe             Relevant Orders    Comprehensive metabolic panel Hyperlipidemia (Chronic)     Cholesterol seems to be doing extremely well  HDL is slightly lower than goal, but the remainder of the blood work is fantastic  Check in 6 months  Continue on Lipitor  Relevant Orders    Comprehensive metabolic panel    Lipid panel    Hypertension     Blood pressure today was excellent  Continue current treatment  Recheck in approximately 6 months  Relevant Orders    Comprehensive metabolic panel    Obesity     BMI is elevated  Would recommend increase exercise, limit carbs, follow-up as scheduled  Relevant Orders    Comprehensive metabolic panel    Vitamin D deficiency     Check vitamin-D in the future  Relevant Orders    Vitamin D 25 hydroxy          Physical Exam  Vitals signs and nursing note reviewed  Constitutional:       Appearance: Normal appearance  Neck:      Vascular: No carotid bruit  Cardiovascular:      Rate and Rhythm: Normal rate and regular rhythm  Pulses: Normal pulses  Carotid pulses are 2+ on the right side and 2+ on the left side  Heart sounds: Normal heart sounds  No murmur  No gallop  Pulmonary:      Effort: Pulmonary effort is normal  No respiratory distress  Breath sounds: Normal breath sounds  No stridor  No wheezing, rhonchi or rales  Chest:      Chest wall: No tenderness  Neurological:      Mental Status: He is alert

## 2021-02-25 ENCOUNTER — OFFICE VISIT (OUTPATIENT)
Dept: FAMILY MEDICINE CLINIC | Facility: CLINIC | Age: 61
End: 2021-02-25
Payer: COMMERCIAL

## 2021-02-25 VITALS
BODY MASS INDEX: 38.8 KG/M2 | SYSTOLIC BLOOD PRESSURE: 124 MMHG | HEIGHT: 68 IN | WEIGHT: 256 LBS | HEART RATE: 64 BPM | DIASTOLIC BLOOD PRESSURE: 82 MMHG | TEMPERATURE: 98.6 F

## 2021-02-25 DIAGNOSIS — Z86.79 H/O AORTIC ANEURYSM REPAIR: Chronic | ICD-10-CM

## 2021-02-25 DIAGNOSIS — E80.6 HYPERBILIRUBINEMIA: ICD-10-CM

## 2021-02-25 DIAGNOSIS — Z98.890 H/O AORTIC ANEURYSM REPAIR: Chronic | ICD-10-CM

## 2021-02-25 DIAGNOSIS — E55.9 VITAMIN D DEFICIENCY: ICD-10-CM

## 2021-02-25 DIAGNOSIS — K21.9 GASTROESOPHAGEAL REFLUX DISEASE WITHOUT ESOPHAGITIS: ICD-10-CM

## 2021-02-25 DIAGNOSIS — E78.2 MIXED HYPERLIPIDEMIA: Chronic | ICD-10-CM

## 2021-02-25 DIAGNOSIS — K59.04 CHRONIC IDIOPATHIC CONSTIPATION: ICD-10-CM

## 2021-02-25 DIAGNOSIS — I35.0 NONRHEUMATIC AORTIC VALVE STENOSIS: Primary | ICD-10-CM

## 2021-02-25 DIAGNOSIS — R74.8 ALKALINE PHOSPHATASE ELEVATION: ICD-10-CM

## 2021-02-25 DIAGNOSIS — E66.01 CLASS 2 SEVERE OBESITY DUE TO EXCESS CALORIES WITH SERIOUS COMORBIDITY AND BODY MASS INDEX (BMI) OF 38.0 TO 38.9 IN ADULT (HCC): ICD-10-CM

## 2021-02-25 DIAGNOSIS — I48.0 PAROXYSMAL ATRIAL FIBRILLATION (HCC): ICD-10-CM

## 2021-02-25 DIAGNOSIS — I10 ESSENTIAL HYPERTENSION: ICD-10-CM

## 2021-02-25 PROCEDURE — T1015 CLINIC SERVICE: HCPCS | Performed by: FAMILY MEDICINE

## 2021-02-25 PROCEDURE — 99214 OFFICE O/P EST MOD 30 MIN: CPT | Performed by: FAMILY MEDICINE

## 2021-02-25 PROCEDURE — 3725F SCREEN DEPRESSION PERFORMED: CPT | Performed by: FAMILY MEDICINE

## 2021-02-25 PROCEDURE — 1036F TOBACCO NON-USER: CPT | Performed by: FAMILY MEDICINE

## 2021-02-25 PROCEDURE — G0439 PPPS, SUBSEQ VISIT: HCPCS | Performed by: FAMILY MEDICINE

## 2021-02-25 PROCEDURE — 3008F BODY MASS INDEX DOCD: CPT | Performed by: FAMILY MEDICINE

## 2021-02-25 PROCEDURE — 3074F SYST BP LT 130 MM HG: CPT | Performed by: FAMILY MEDICINE

## 2021-02-25 PROCEDURE — 3079F DIAST BP 80-89 MM HG: CPT | Performed by: FAMILY MEDICINE

## 2021-02-25 RX ORDER — DOCUSATE SODIUM 100 MG/1
100 CAPSULE, LIQUID FILLED ORAL DAILY
Qty: 365 CAPSULE | Refills: 0
Start: 2021-02-25 | End: 2021-05-04 | Stop reason: HOSPADM

## 2021-02-25 NOTE — ASSESSMENT & PLAN NOTE
Patient does have constipation issues  It is reasonable for him to add fiber supplementation, Colace, MiraLax  He wondered specifically about Colace, which is very reasonable to take every day  He has increased water intake, so I would encourage him to continue that

## 2021-02-25 NOTE — PROGRESS NOTES
Assessment and Plan:     Problem List Items Addressed This Visit     None           Preventive health issues were discussed with patient, and age appropriate screening tests were ordered as noted in patient's After Visit Summary  Personalized health advice and appropriate referrals for health education or preventive services given if needed, as noted in patient's After Visit Summary       History of Present Illness:     Patient presents for Medicare Annual Wellness visit    Patient Care Team:  Ziggy Burnette MD as PCP - General  MD Roya Kramer, MD Karly Bustos PA-C Gena Rubinstein, PASUSHIL Burnette MD     Problem List:     Patient Active Problem List   Diagnosis    Numbness    H/O aortic aneurysm repair    Hypertension    GERD (gastroesophageal reflux disease)    Anxiety disorder    [facility]    BRBPR (bright red blood per rectum)    Constipation    Fecal soiling    Irritable bowel syndrome    Hyperlipidemia    Panic attack    Peyronie disease    Vitamin D deficiency    Kidney stones    Intrinsic eczema    Obesity    Chest pain, atypical    Other viral warts    Physical deconditioning    History of stroke    Anticoagulated by anticoagulation treatment    Vision changes    Dental disorder    Antiphospholipid antibody with hypercoagulable state ([facility]    Aortic valve stenosis    Hyperbilirubinemia    Alkaline phosphatase elevation    Knee pain    Sore throat    Non-restorative sleep    Confusion    CORIE (obstructive sleep apnea)    Abdominal pain      Past Medical and Surgical History:     Past Medical History:   Diagnosis Date    Aneurysm of thoracic aorta [facility]     last assessed 11/20/17    Anxiety     currently on no meds    Cardiac disease     Cholelithiasis     Chronic kidney disease     GERD (gastroesophageal reflux disease)     Headache due to anesthesia     Hyperlipidemia     Hypertension     Irritable bowel syndrome     Kidney stone     Palpitations     PONV (postoperative nausea and vomiting)     Shortness of breath     ? related to acid reflux    Sleep apnea     not sure    Stroke [facility]) 11/2014    TIA (transient ischemic attack)      Past Surgical History:   Procedure Laterality Date    AORTA SURGERY      thoracic - aneurysmorrhaphy    APPENDECTOMY      ASCENDING AORTIC ANEURYSM REPAIR      resolved 8/19/15    CHOLECYSTECTOMY      laparoscopic    CYSTOSCOPY      with removal of object- stent removal    KNEE ARTHROSCOPY Right 1996    with medial meniscus repair    LITHOTRIPSY      renal    SC FRAGMENT KIDNEY STONE/ ESWL Left 5/17/2018    Procedure: LITHROTRIPSY EXTRACORPORAL SHOCKWAVE (ESWL);   Surgeon: Sun Hebert MD;  Location: [facility]  MAIN OR;  Service: Urology    THUMB ARTHROSCOPY Right 1976    ligament repair      Family History:     Family History   Problem Relation Age of Onset    Diverticulitis Mother         of colon    Nephrolithiasis Mother     Emphysema Father     Nephrolithiasis Sister     Heart attack Maternal Grandfather 72    Breast cancer Paternal Grandmother     Lung cancer Paternal Grandfather     Cancer Family     Coronary artery disease Family     Diabetes Family         sibling    Hypertension Family         sibling    Hernia Family         sibling      Social History:     E-Cigarette/Vaping    E-Cigarette Use Never User      E-Cigarette/Vaping Substances    Nicotine No     THC No     CBD No     Flavoring No     Other No     Unknown No      Social History     Socioeconomic History    Marital status:      Spouse name: None    Number of children: None    Years of education: None    Highest education level: None   Occupational History    Occupation: disabled     Social Needs    Financial resource strain: None    Food insecurity     Worry: None     Inability: None    Transportation needs     Medical: None Non-medical: None   Tobacco Use    Smoking status: Never Smoker    Smokeless tobacco: Never Used    Tobacco comment: no second hand smoke exposure   Substance and Sexual Activity    Alcohol use: Not Currently    Drug use: No    Sexual activity: None   Lifestyle    Physical activity     Days per week: None     Minutes per session: None    Stress: None   Relationships    Social connections     Talks on phone: None     Gets together: None     Attends Worship service: None     Active member of club or organization: None     Attends meetings of clubs or organizations: None     Relationship status: None    Intimate partner violence     Fear of current or ex partner: None     Emotionally abused: None     Physically abused: None     Forced sexual activity: None   Other Topics Concern    None   Social History Narrative    Caffeine use    Completed some college     as per [facility]      Medications and Allergies:     Current Outpatient Medications   Medication Sig Dispense Refill    amLODIPine (NORVASC) 5 mg tablet Take 1 tablet (5 mg total) by mouth daily 90 tablet 1    apixaban (ELIQUIS) 5 mg Take 1 tablet (5 mg total) by mouth 2 (two) times a day 60 tablet 5    aspirin (ECOTRIN LOW STRENGTH) 81 mg EC tablet Take 1 tablet (81 mg total) by mouth daily 30 tablet 0    atorvastatin (LIPITOR) 40 mg tablet Take 1 tablet (40 mg total) by mouth daily with dinner 90 tablet 1    cholecalciferol (VITAMIN D3) 1,000 units tablet Take 1 tablet (1,000 Units total) by mouth daily 30 tablet 5    metoprolol succinate (TOPROL-XL) 50 mg 24 hr tablet TAKE 3 TABLETS BY MOUTH EVERY  tablet 1    pantoprazole (PROTONIX) 40 mg tablet Take 1 tablet (40 mg total) by mouth daily 90 tablet 1     No current facility-administered medications for this visit        Allergies   Allergen Reactions    Losartan Angioedema    Bactrim [Sulfamethoxazole-Trimethoprim]     Lisinopril      Felt bad, was OK with Zestril brand name     Tramadol       Immunizations:     Immunization History   Administered Date(s) Administered    INFLUENZA 09/24/2019    Influenza Injectable, MDCK, Preservative Free, Quadrivalent, 0 5 mL 11/06/2018, 09/24/2019, 09/18/2020    Influenza Quadrivalent Preservative Free 3 years and older IM 11/14/2017    Td (adult), Unspecified 08/04/2009    Tdap 11/10/2017      Health Maintenance:         Topic Date Due    Hepatitis C Screening  1960    HIV Screening  02/06/1975    Colonoscopy Surveillance  11/12/2025    Colorectal Cancer Screening  11/12/2030     There are no preventive care reminders to display for this patient  Medicare Health Risk Assessment:     /82 (BP Location: Left arm, Patient Position: Sitting, Cuff Size: Large)   Pulse 64   Temp 98 6 °F (37 °C) (Oral)   Ht 5' 8" (1 727 m)   Wt 116 kg (256 lb)   BMI 38 92 kg/m²      Vale Gomez is here for his Subsequent Wellness visit  Health Risk Assessment:   Patient rates overall health as poor  Patient feels that their physical health rating is same  Eyesight was rated as same  Hearing was rated as same  Patient feels that their emotional and mental health rating is same  Pain experienced in the last 7 days has been some  Patient's pain rating has been 5/10  Patient states that he has experienced weight loss or gain in last 6 months  Depression Screening:   PHQ-2 Score: 1      Fall Risk Screening: In the past year, patient has experienced: history of falling in past year    Number of falls: 1    Home Safety:  Patient does not have trouble with stairs inside or outside of their home  Patient has working smoke alarms and has working carbon monoxide detector  Home safety hazards include: none  Nutrition:   Current diet is Limited junk food and No Added Salt  Medications:   Patient is currently taking over-the-counter supplements  OTC medications include: see medication list  Patient is able to manage medications       Activities of Daily Living (ADLs)/Instrumental Activities of Daily Living (IADLs):   Walk and transfer into and out of bed and chair?: Yes  Dress and groom yourself?: Yes    Bathe or shower yourself?: Yes    Feed yourself?  Yes  Do your laundry/housekeeping?: Yes  Manage your money, pay your bills and track your expenses?: Yes  Make your own meals?: Yes    Do your own shopping?: Yes    Previous Hospitalizations:   Any hospitalizations or ED visits within the last 12 months?: No      Advance Care Planning:   Living will: No    Durable POA for healthcare: No    Advanced directive: No    Advanced directive counseling given: Yes    Five wishes given: Yes      Cognitive Screening:   Provider or family/friend/caregiver concerned regarding cognition?: No    PREVENTIVE SCREENINGS      Cardiovascular Screening:    General: Screening Not Indicated and History Lipid Disorder      Diabetes Screening:     General: Screening Current      Colorectal Cancer Screening:     General: Screening Current      Prostate Cancer Screening:    General: Screening Current      Osteoporosis Screening:    General: Screening Not Indicated      Abdominal Aortic Aneurysm (AAA) Screening:        General: Screening Not Indicated      Lung Cancer Screening:     General: Screening Not Indicated      Hepatitis C Screening:    General: Screening Not Indicated      Prieto Courtney MD

## 2021-02-25 NOTE — PATIENT INSTRUCTIONS
Problem List Items Addressed This Visit     Alkaline phosphatase elevation     Isoenzymes in 2020 were essentially normal   Alk-phos remains somewhat elevated  Continue to observe  Check with next blood work  Relevant Orders    Comprehensive metabolic panel    Alkaline phosphatase, isoenzymes    Aortic valve stenosis - Primary     Stable  Mild murmur noted today  Has follow-up with Cardiology  Atrial fibrillation (HCC)     No abnormalities noted today  Regular heart rate today  Continues on Eliquis and metoprolol  Constipation     Patient does have constipation issues  It is reasonable for him to add fiber supplementation, Colace, MiraLax  He wondered specifically about Colace, which is very reasonable to take every day  He has increased water intake, so I would encourage him to continue that  Relevant Medications    docusate sodium (COLACE) 100 mg capsule    GERD (gastroesophageal reflux disease)     Reflux seems to be doing relatively well  Continues on Protonix, without particular issues  H/O aortic aneurysm repair (Chronic)     Stable  Follow with CT surgery in the future as needed  Follow with Cardiology  Hyperbilirubinemia     Bilirubin has been elevated more recently  This bilirubin was minimally elevated today  Check with next blood work in approximately 6 months  Continue to observe  Relevant Orders    Comprehensive metabolic panel    Hyperlipidemia (Chronic)     Cholesterol seems to be doing extremely well  HDL is slightly lower than goal, but the remainder of the blood work is fantastic  Check in 6 months  Continue on Lipitor  Relevant Orders    Comprehensive metabolic panel    Lipid panel    Hypertension     Blood pressure today was excellent  Continue current treatment  Recheck in approximately 6 months  Relevant Orders    Comprehensive metabolic panel    Obesity     BMI is elevated    Would recommend increase exercise, limit carbs, follow-up as scheduled  Relevant Orders    Comprehensive metabolic panel    Vitamin D deficiency     Check vitamin-D in the future  Relevant Orders    Vitamin D 25 hydroxy          COVID 19 Instructions    Johnny Dover was advised to limit contact with others to essential tasks such as getting food, medications, and medical care  Proper handwashing reviewed, and Hand sanitzer when washing is not available  If the patient develops symptoms of COVID 19, the patient should call the office as soon as possible  For [id number] Flu season, it is strongly recommended that Flu Vaccinations be obtained  Please try to download [employer] Duo  Once you do download this on your phone, you will be prompted to add your phone number to the account  After that, he should receive a text from [facility], and use that code to verify your phone number  After that, you should be able to use Google Duo to receive and make video calls  Please download Microsoft Teams to your phone or computer  We will be transitioning to this platform for Video Visits  Instructions for downloading this are available from the office  We are committed to getting you vaccinated as soon as possible and will be closely following CDC and [facility]  guidelines as they are released and revised  Please refer to our COVID-19 vaccine webpage for the most up to date information on the vaccine and our distribution efforts      Jose gallegos

## 2021-02-25 NOTE — ASSESSMENT & PLAN NOTE
Cholesterol seems to be doing extremely well  HDL is slightly lower than goal, but the remainder of the blood work is fantastic  Check in 6 months  Continue on Lipitor

## 2021-02-25 NOTE — ASSESSMENT & PLAN NOTE
Bilirubin has been elevated more recently  This bilirubin was minimally elevated today  Check with next blood work in approximately 6 months  Continue to observe

## 2021-02-25 NOTE — ASSESSMENT & PLAN NOTE
Isoenzymes in 2020 were essentially normal   Alk-phos remains somewhat elevated  Continue to observe  Check with next blood work

## 2021-03-10 ENCOUNTER — REMOTE DEVICE CLINIC VISIT (OUTPATIENT)
Dept: CARDIOLOGY CLINIC | Facility: CLINIC | Age: 61
End: 2021-03-10
Payer: COMMERCIAL

## 2021-03-10 DIAGNOSIS — Z95.818 PRESENCE OF OTHER CARDIAC IMPLANTS AND GRAFTS: Primary | ICD-10-CM

## 2021-03-10 PROCEDURE — 93298 REM INTERROG DEV EVAL SCRMS: CPT | Performed by: INTERNAL MEDICINE

## 2021-03-10 PROCEDURE — G2066 INTER DEVC REMOTE 30D: HCPCS | Performed by: INTERNAL MEDICINE

## 2021-03-10 NOTE — PROGRESS NOTES
[employer]EL[employer] TRANSMISSION: LOOP RECORDER  PRESENTING RHYTHM NSR W/ PAC @ 63 BPM  BATTERY STATUS "OK"  3 AF EPISODES W/ EGRAMS SUGGESTING SR W/ PACs AND PVCs   CAN NOT R/O AF  AF BURDEN =0%  NO PATIENT ACTIVATED EPISODES  NORMAL DEVICE FUNCTION   DL

## 2021-03-24 ENCOUNTER — TELEPHONE (OUTPATIENT)
Dept: NEUROLOGY | Facility: CLINIC | Age: 61
End: 2021-03-24

## 2021-03-24 DIAGNOSIS — E78.5 HYPERLIPIDEMIA: Chronic | ICD-10-CM

## 2021-03-24 RX ORDER — ATORVASTATIN CALCIUM 40 MG/1
TABLET, FILM COATED ORAL
Qty: 90 TABLET | Refills: 1 | Status: SHIPPED | OUTPATIENT
Start: 2021-03-24 | End: 2021-09-20

## 2021-03-24 NOTE — TELEPHONE ENCOUNTER
Spoke to patient and rescheduled appt on 3/26/21  Appointment added to [email or web address], [facility]  -  [facility]   Rescheduled for 4/30/21 at 8:30 AM at [place] office  Mailed out appt reminder card to patient

## 2021-03-29 ENCOUNTER — IMMUNIZATIONS (OUTPATIENT)
Dept: FAMILY MEDICINE CLINIC | Facility: HOSPITAL | Age: 61
End: 2021-03-29

## 2021-03-29 DIAGNOSIS — Z23 ENCOUNTER FOR IMMUNIZATION: Primary | ICD-10-CM

## 2021-03-29 PROCEDURE — 91300 SARS-COV-2 / COVID-19 MRNA VACCINE (PFIZER-BIONTECH) 30 MCG: CPT

## 2021-03-29 PROCEDURE — 0001A SARS-COV-2 / COVID-19 MRNA VACCINE (PFIZER-BIONTECH) 30 MCG: CPT

## 2021-03-31 DIAGNOSIS — K21.9 GERD (GASTROESOPHAGEAL REFLUX DISEASE): ICD-10-CM

## 2021-03-31 RX ORDER — PANTOPRAZOLE SODIUM 40 MG/1
TABLET, DELAYED RELEASE ORAL
Qty: 90 TABLET | Refills: 1 | Status: SHIPPED | OUTPATIENT
Start: 2021-03-31 | End: 2021-09-27

## 2021-04-13 ENCOUNTER — TRANSCRIBE ORDERS (OUTPATIENT)
Dept: ADMINISTRATIVE | Facility: HOSPITAL | Age: 61
End: 2021-04-13

## 2021-04-13 DIAGNOSIS — H53.453 OTHER LOCALIZED VISUAL FIELD DEFECT, BILATERAL: Primary | ICD-10-CM

## 2021-04-13 DIAGNOSIS — H53.453 NASAL STEP VISUAL FIELD DEFECT OF BOTH EYES: ICD-10-CM

## 2021-04-16 ENCOUNTER — TELEPHONE (OUTPATIENT)
Dept: UROLOGY | Facility: AMBULATORY SURGERY CENTER | Age: 61
End: 2021-04-16

## 2021-04-16 ENCOUNTER — HOSPITAL ENCOUNTER (EMERGENCY)
Facility: HOSPITAL | Age: 61
Discharge: HOME | End: 2021-04-17
Attending: EMERGENCY MEDICINE | Admitting: EMERGENCY MEDICINE
Payer: COMMERCIAL

## 2021-04-16 ENCOUNTER — APPOINTMENT (EMERGENCY)
Dept: RADIOLOGY | Facility: HOSPITAL | Age: 61
End: 2021-04-16
Payer: COMMERCIAL

## 2021-04-16 ENCOUNTER — APPOINTMENT (OUTPATIENT)
Dept: LAB | Facility: HOSPITAL | Age: 61
End: 2021-04-16
Payer: COMMERCIAL

## 2021-04-16 ENCOUNTER — APPOINTMENT (OUTPATIENT)
Dept: LAB | Facility: HOSPITAL | Age: 61
End: 2021-04-16
Attending: OPHTHALMOLOGY
Payer: COMMERCIAL

## 2021-04-16 VITALS
HEIGHT: 68 IN | DIASTOLIC BLOOD PRESSURE: 74 MMHG | BODY MASS INDEX: 39.4 KG/M2 | SYSTOLIC BLOOD PRESSURE: 153 MMHG | OXYGEN SATURATION: 97 % | HEART RATE: 54 BPM | WEIGHT: 260 LBS | TEMPERATURE: 98.4 F | RESPIRATION RATE: 18 BRPM

## 2021-04-16 DIAGNOSIS — H53.453 NASAL STEP VISUAL FIELD DEFECT OF BOTH EYES: ICD-10-CM

## 2021-04-16 DIAGNOSIS — R10.9 RIGHT FLANK PAIN: Primary | ICD-10-CM

## 2021-04-16 DIAGNOSIS — R35.0 URINARY FREQUENCY: Primary | ICD-10-CM

## 2021-04-16 DIAGNOSIS — H53.453 OTHER LOCALIZED VISUAL FIELD DEFECT, BILATERAL: ICD-10-CM

## 2021-04-16 LAB
ALBUMIN SERPL BCP-MCNC: 3.5 G/DL (ref 3.5–5)
ALP SERPL-CCNC: 138 U/L (ref 46–116)
ALT SERPL W P-5'-P-CCNC: 40 U/L (ref 12–78)
ANION GAP SERPL CALCULATED.3IONS-SCNC: 5 MMOL/L (ref 4–13)
AST SERPL W P-5'-P-CCNC: 18 U/L (ref 5–45)
BACTERIA UR QL AUTO: ABNORMAL /HPF
BASOPHILS # BLD AUTO: 0.02 THOUSANDS/ΜL (ref 0–0.1)
BASOPHILS NFR BLD AUTO: 0 % (ref 0–1)
BILIRUB SERPL-MCNC: 0.85 MG/DL (ref 0.2–1)
BILIRUB UR QL STRIP: NEGATIVE
BUN SERPL-MCNC: 12 MG/DL (ref 5–25)
BUN SERPL-MCNC: 13 MG/DL (ref 5–25)
CALCIUM SERPL-MCNC: 8.5 MG/DL (ref 8.3–10.1)
CHLORIDE SERPL-SCNC: 106 MMOL/L (ref 100–108)
CLARITY UR: CLEAR
CO2 SERPL-SCNC: 27 MMOL/L (ref 21–32)
COLOR UR: YELLOW
CREAT SERPL-MCNC: 1.16 MG/DL (ref 0.6–1.3)
CREAT SERPL-MCNC: 1.24 MG/DL (ref 0.6–1.3)
EOSINOPHIL # BLD AUTO: 0.24 THOUSAND/ΜL (ref 0–0.61)
EOSINOPHIL NFR BLD AUTO: 3 % (ref 0–6)
ERYTHROCYTE [DISTWIDTH] IN BLOOD BY AUTOMATED COUNT: 13 % (ref 11.6–15.1)
GFR SERPL CREATININE-BSD FRML MDRD: 62 ML/MIN/1.73SQ M
GFR SERPL CREATININE-BSD FRML MDRD: 68 ML/MIN/1.73SQ M
GLUCOSE SERPL-MCNC: 114 MG/DL (ref 65–140)
GLUCOSE UR STRIP-MCNC: NEGATIVE MG/DL
HCT VFR BLD AUTO: 42.2 % (ref 36.5–49.3)
HGB BLD-MCNC: 14.5 G/DL (ref 12–17)
HGB UR QL STRIP.AUTO: NEGATIVE
IMM GRANULOCYTES # BLD AUTO: 0.02 THOUSAND/UL (ref 0–0.2)
IMM GRANULOCYTES NFR BLD AUTO: 0 % (ref 0–2)
KETONES UR STRIP-MCNC: NEGATIVE MG/DL
LEUKOCYTE ESTERASE UR QL STRIP: NEGATIVE
LIPASE SERPL-CCNC: 114 U/L (ref 73–393)
LYMPHOCYTES # BLD AUTO: 1.4 THOUSANDS/ΜL (ref 0.6–4.47)
LYMPHOCYTES NFR BLD AUTO: 14 % (ref 14–44)
MCH RBC QN AUTO: 30.3 PG (ref 26.8–34.3)
MCHC RBC AUTO-ENTMCNC: 34.4 G/DL (ref 31.4–37.4)
MCV RBC AUTO: 88 FL (ref 82–98)
MONOCYTES # BLD AUTO: 0.55 THOUSAND/ΜL (ref 0.17–1.22)
MONOCYTES NFR BLD AUTO: 6 % (ref 4–12)
NEUTROPHILS # BLD AUTO: 7.48 THOUSANDS/ΜL (ref 1.85–7.62)
NEUTS SEG NFR BLD AUTO: 77 % (ref 43–75)
NITRITE UR QL STRIP: NEGATIVE
NON-SQ EPI CELLS URNS QL MICRO: ABNORMAL /HPF
NRBC BLD AUTO-RTO: 0 /100 WBCS
PH UR STRIP.AUTO: 5.5 [PH]
PLATELET # BLD AUTO: 191 THOUSANDS/UL (ref 149–390)
PMV BLD AUTO: 9.7 FL (ref 8.9–12.7)
POTASSIUM SERPL-SCNC: 3.8 MMOL/L (ref 3.5–5.3)
PROT SERPL-MCNC: 7 G/DL (ref 6.4–8.2)
PROT UR STRIP-MCNC: NEGATIVE MG/DL
RBC # BLD AUTO: 4.79 MILLION/UL (ref 3.88–5.62)
RBC #/AREA URNS AUTO: ABNORMAL /HPF
SODIUM SERPL-SCNC: 138 MMOL/L (ref 136–145)
SP GR UR STRIP.AUTO: 1.01 (ref 1–1.03)
UROBILINOGEN UR QL STRIP.AUTO: 0.2 E.U./DL
WBC # BLD AUTO: 9.71 THOUSAND/UL (ref 4.31–10.16)
WBC #/AREA URNS AUTO: ABNORMAL /HPF

## 2021-04-16 PROCEDURE — 81001 URINALYSIS AUTO W/SCOPE: CPT

## 2021-04-16 PROCEDURE — G1004 CDSM NDSC: HCPCS

## 2021-04-16 PROCEDURE — 85025 COMPLETE CBC W/AUTO DIFF WBC: CPT | Performed by: EMERGENCY MEDICINE

## 2021-04-16 PROCEDURE — 99284 EMERGENCY DEPT VISIT MOD MDM: CPT

## 2021-04-16 PROCEDURE — 80053 COMPREHEN METABOLIC PANEL: CPT | Performed by: EMERGENCY MEDICINE

## 2021-04-16 PROCEDURE — 96372 THER/PROPH/DIAG INJ SC/IM: CPT

## 2021-04-16 PROCEDURE — 83690 ASSAY OF LIPASE: CPT | Performed by: EMERGENCY MEDICINE

## 2021-04-16 PROCEDURE — 84520 ASSAY OF UREA NITROGEN: CPT

## 2021-04-16 PROCEDURE — 82565 ASSAY OF CREATININE: CPT

## 2021-04-16 PROCEDURE — 36415 COLL VENOUS BLD VENIPUNCTURE: CPT

## 2021-04-16 PROCEDURE — 99284 EMERGENCY DEPT VISIT MOD MDM: CPT | Performed by: EMERGENCY MEDICINE

## 2021-04-16 PROCEDURE — 74177 CT ABD & PELVIS W/CONTRAST: CPT

## 2021-04-16 RX ORDER — KETOROLAC TROMETHAMINE 30 MG/ML
15 INJECTION, SOLUTION INTRAMUSCULAR; INTRAVENOUS ONCE
Status: COMPLETED | OUTPATIENT
Start: 2021-04-16 | End: 2021-04-16

## 2021-04-16 RX ORDER — DIAZEPAM 5 MG/1
10 TABLET ORAL 2 TIMES DAILY PRN
Qty: 5 TABLET | Refills: 0 | Status: SHIPPED | OUTPATIENT
Start: 2021-04-16 | End: 2021-05-04 | Stop reason: HOSPADM

## 2021-04-16 RX ORDER — LIDOCAINE 50 MG/G
1 PATCH TOPICAL ONCE
Status: DISCONTINUED | OUTPATIENT
Start: 2021-04-16 | End: 2021-04-17 | Stop reason: HOSPADM

## 2021-04-16 RX ADMIN — IOHEXOL 100 ML: 350 INJECTION, SOLUTION INTRAVENOUS at 21:55

## 2021-04-16 RX ADMIN — DICLOFENAC SODIUM 2 G: 10 GEL TOPICAL at 23:10

## 2021-04-16 RX ADMIN — LIDOCAINE 1 PATCH: 50 PATCH TOPICAL at 22:15

## 2021-04-16 RX ADMIN — KETOROLAC TROMETHAMINE 15 MG: 30 INJECTION, SOLUTION INTRAMUSCULAR at 23:41

## 2021-04-16 NOTE — TELEPHONE ENCOUNTER
Start with UA/Cx thanks   Reviewed last US and CT scan within the past 3 months both negative for stones

## 2021-04-16 NOTE — TELEPHONE ENCOUNTER
Patient last seen [facility]) patient called in stating he has been experiencing lower back and right side pain for about a month  Patient stated the past few days he has been having abdomen discomfort   Patient can be reached at [phone]

## 2021-04-16 NOTE — TELEPHONE ENCOUNTER
Spoke with patient  Patient has a history of kidney stones  Complaining of  Pain for about a month in the lower back and right side  Also abdominal discomfort the past few days  Denies blood but has a vision change  Frequency, urgency at times  Denies chills but feels like he might have a fever at times  Pain scale is between a 5 and a 7  Patient recently stopped his  Atorvastatin  I informed patient to obtain urine testing to rule out an urine tract infection  Told patient he could take tylenol for the pain but it gets to bad to go to the ER  If he gets a fever or sees blood clots to go to the ER because it is the weekend  Told patient to make sure that he is staying hydrated  Informed him of the possibility of having to obtain imaging  Patient verbalized understanding and agreed      Please advise on futher testing  or instructions

## 2021-04-17 NOTE — ED PROVIDER NOTES
History  Chief Complaint   Patient presents with    Abdominal Pain     pt state he has had abdominal pain for X30 days  pt was seen at [facility] on [address] today and got blood work done  Shoshana Silverio is a 59-year-old man with medical history significant for aortic aneurysm status post repair, cardiac disease, prior kidney stone, presenting with right flank pain over the last month  He states the pain worsened over last 24-48 hours  He called his urologist who ordered an outpatient UA which showed occasional bacteria and 0-1 WBCs  He has been treating his pain with Tylenol, last go around 4:30 p m  Today  The pain begins in his right flank and radiates into his right groin  Nothing improves or worsens the pain  He states that it feels like his previous kidney stones  He reports some urinary urgency, no frequency, hematuria or dysuria  He reports no real abdominal pain but rather localizes pain to his right flank  No changes in his stools, no blood in stool or melena  No diarrhea  No fevers, chills, chest pain  He reports mild shortness of breath  He has a history of cholecystectomy and appendectomy  She recently had an ultrasound of the liver which was normal             Prior to Admission Medications   Prescriptions Last Dose Informant Patient Reported? Taking?    amLODIPine (NORVASC) 5 mg tablet  Self No No   Sig: Take 1 tablet (5 mg total) by mouth daily   apixaban (ELIQUIS) 5 mg  Self No No   Sig: Take 1 tablet (5 mg total) by mouth 2 (two) times a day   aspirin (ECOTRIN LOW STRENGTH) 81 mg EC tablet  Self No No   Sig: Take 1 tablet (81 mg total) by mouth daily   atorvastatin (LIPITOR) 40 mg tablet   No No   Sig: TAKE 1 TABLET BY MOUTH DAILY WITH DINNER   cholecalciferol (VITAMIN D3) 1,000 units tablet  Self No No   Sig: Take 1 tablet (1,000 Units total) by mouth daily   docusate sodium (COLACE) 100 mg capsule   No No   Sig: Take 1 capsule (100 mg total) by mouth daily   metoprolol succinate (TOPROL-XL) 50 mg 24 hr tablet  Self No No   Sig: TAKE 3 TABLETS BY MOUTH EVERY DAY   pantoprazole (PROTONIX) 40 mg tablet   No No   Sig: TAKE 1 TABLET BY MOUTH EVERY DAY      Facility-Administered Medications: None       Past Medical History:   Diagnosis Date    Aneurysm of thoracic aorta ([address] )     last assessed 11/20/17    Anxiety     currently on no meds    Cardiac disease     Cholelithiasis     Chronic kidney disease     GERD (gastroesophageal reflux disease)     Headache due to anesthesia     Hyperlipidemia     Hypertension     Irritable bowel syndrome     Kidney stone     Palpitations     PONV (postoperative nausea and vomiting)     Shortness of breath     ? related to acid reflux    Sleep apnea     not sure    Stroke [facility]) 11/2014    TIA (transient ischemic attack)        Past Surgical History:   Procedure Laterality Date    AORTA SURGERY      thoracic - aneurysmorrhaphy    APPENDECTOMY      ASCENDING AORTIC ANEURYSM REPAIR      resolved 8/19/15    CHOLECYSTECTOMY      laparoscopic    CYSTOSCOPY      with removal of object- stent removal    KNEE ARTHROSCOPY Right 1996    with medial meniscus repair    LITHOTRIPSY      renal    [place] FRAGMENT KIDNEY STONE/ ESWL Left 5/17/2018    Procedure: LITHROTRIPSY EXTRACORPORAL SHOCKWAVE (ESWL);   Surgeon: Yair Moy MD;  Location: [facility]  MAIN [facility];  Service: Urology    THUMB ARTHROSCOPY Right 1976    ligament repair       Family History   Problem Relation Age of Onset    Diverticulitis Mother         of colon    Nephrolithiasis Mother     Emphysema Father     Nephrolithiasis Sister     Heart attack Maternal Grandfather 72    Breast cancer Paternal Grandmother     Lung cancer Paternal Grandfather     Cancer Family     Coronary artery disease Family     Diabetes Family         sibling    Hypertension Family         sibling    Hernia Family         sibling     I have reviewed and agree with the history as documented  E-Cigarette/Vaping    E-Cigarette Use Never User      E-Cigarette/Vaping Substances    Nicotine No     THC No     CBD No     Flavoring No     Other No     Unknown No      Social History     Tobacco Use    Smoking status: Never Smoker    Smokeless tobacco: Never Used    Tobacco comment: no second hand smoke exposure   Substance Use Topics    Alcohol use: Not Currently    Drug use: No        Review of Systems   Constitutional: Negative for chills and fever  Respiratory: Positive for shortness of breath  Cardiovascular: Negative for chest pain  Gastrointestinal: Positive for abdominal distention and nausea  Negative for abdominal pain, anal bleeding, blood in stool and vomiting  Genitourinary: Positive for flank pain and urgency  Negative for dysuria, frequency and hematuria  Musculoskeletal: Negative for back pain  All other systems reviewed and are negative  Physical Exam  ED Triage Vitals [04/16/21 2013]   Temperature Pulse Respirations Blood Pressure SpO2   98 4 °F (36 9 °C) 56 18 (!) 183/81 98 %      Temp Source Heart Rate Source Patient Position - Orthostatic VS BP Location FiO2 (%)   Oral Monitor Lying Right arm --      Pain Score       7             Orthostatic Vital Signs  Vitals:    04/16/21 2013   BP: (!) 183/81   Pulse: 56   Patient Position - Orthostatic VS: Lying       Physical Exam  Vitals signs reviewed  Constitutional:       General: He is not in acute distress  Appearance: He is obese  He is not toxic-appearing  HENT:      Head: Normocephalic and atraumatic  Right Ear: External ear normal       Left Ear: External ear normal       Nose: Nose normal    Eyes:      General: No scleral icterus  Extraocular Movements: Extraocular movements intact  Cardiovascular:      Rate and Rhythm: Normal rate and regular rhythm  Heart sounds: Normal heart sounds  Pulmonary:      Effort: Pulmonary effort is normal  No respiratory distress        Breath sounds: Normal breath sounds  Abdominal:      General: Abdomen is protuberant  There is distension  Palpations: Abdomen is soft  Tenderness: There is no abdominal tenderness  There is right CVA tenderness  There is no left CVA tenderness or guarding  Negative signs include Martinez's sign and McBurney's sign  Musculoskeletal:         General: No deformity  Skin:     General: Skin is warm and dry  Capillary Refill: Capillary refill takes less than 2 seconds  Coloration: Skin is not jaundiced or pale  Neurological:      General: No focal deficit present  Mental Status: He is alert and oriented to person, place, and time  Mental status is at baseline  ED Medications  Medications   lidocaine (LIDODERM) 5 % patch 1 patch (has no administration in time range)   Diclofenac Sodium (VOLTAREN) 1 % topical gel 2 g (has no administration in time range)   iohexol (OMNIPAQUE) 350 MG/ML injection (MULTI-DOSE) 100 mL (100 mL Intravenous Given 4/16/21 2155)       Diagnostic Studies  Results Reviewed     Procedure Component Value Units Date/Time    CBC and differential [[id number]]     Lab Status: No result Specimen: Blood     CMP [[id number]]     Lab Status: No result Specimen: Blood     Lipase [[id number]]     Lab Status: No result Specimen: Blood                  CT Abdomen pelvis with contrast    (Results Pending)         Procedures  Procedures      ED Course                                       MDM  Number of Diagnoses or Management Options  Right flank pain:   Diagnosis management comments: 60-year-old man presenting with abdominal pain  Most concerning for renal colic, aortic dissection, fatty liver disease, musculoskeletal strain  Still possible of less likely to be diverticulitis, colitis, pancreatitis    Will evaluate in the emergency room CMP, CBC, lipase, CT abdomen pelvis with contrast   Patient took Tylenol immediately before coming to the emergency department, will treat pain in the emergency department lidocaine patch and diclofenac cream     Patient signed out to co-resident  Plan to workup for any pathology found on emergency department workup  Otherwise plan is to discharge with follow-up with [facility]  Disposition  Final diagnoses:   Right flank pain     Time reflects when diagnosis was documented in both MDM as applicable and the Disposition within this note     Time User Action Codes Description Comment    4/16/2021 10:02 PM Nathanael Aguirre [R10 9] Right flank pain       ED Disposition     None      Follow-up Information     Follow up With Specialties Details Why Contact Info Additional Information    [facility] Comprehensive Spine Program Physical Therapy   [phone]          Patient's Medications   Discharge Prescriptions    No medications on file     No discharge procedures on file  PDMP Review     None           ED Provider  Attending physically available and evaluated Talita Fischer I managed the patient along with the ED Attending      Electronically Signed by         Gaby Hernadez MD  04/16/21 9286

## 2021-04-17 NOTE — ED ATTENDING ATTESTATION
4/16/2021  Salima Pyle DO, saw and evaluated the patient  I have discussed the patient with the resident/non-physician practitioner and agree with the resident's/non-physician practitioner's findings, Plan of Care, and MDM as documented in the resident's/non-physician practitioner's note, except where noted  All available labs and Radiology studies were reviewed  I was present for key portions of any procedure(s) performed by the resident/non-physician practitioner and I was immediately available to provide assistance  At this point I agree with the current assessment done in the Emergency Department  I have conducted an independent evaluation of this patient a history and physical is as follows:    65 yo male c/o R abd pain for last month, abd pain radiating into groin/flank  Constant, waxes and wanes  Worse with movement  No other a/e factors  States pain feels similar to prior kidney stones  Hx ascending aortic aneurysm repair/recontstruction in 2014 as well as a 2mm R ICA aneurysm which has not required intervention  -SLR  +R CVAT  EHL 5/5 B  Equal sensation over L3-S2 B    Imp: subacute R abd pain, unclear etiology  Hx of aorta surgery plan: CT abd/pelvis  UA, renal fx already obtained earlier today and reviewed - no acute findings  Will add CMP, CBC, lipase        ED Course         Critical Care Time  Procedures

## 2021-04-17 NOTE — DISCHARGE INSTRUCTIONS
You were seen in the emergency department today for right flank pain  This is most likely due to musculoskeletal strain  You can continue to treat your pain at home with valium,Tylenol, ibuprofen, Voltaren cream, lidocaine patches, and ice  Light exercise will help to improve your pain  Please follow-up with the specialists at the [facility] further evaluation and treatment

## 2021-04-19 ENCOUNTER — NURSE TRIAGE (OUTPATIENT)
Dept: PHYSICAL THERAPY | Facility: OTHER | Age: 61
End: 2021-04-19

## 2021-04-19 ENCOUNTER — TELEPHONE (OUTPATIENT)
Dept: PHYSICAL THERAPY | Facility: OTHER | Age: 61
End: 2021-04-19

## 2021-04-19 ENCOUNTER — IMMUNIZATIONS (OUTPATIENT)
Dept: FAMILY MEDICINE CLINIC | Facility: HOSPITAL | Age: 61
End: 2021-04-19

## 2021-04-19 DIAGNOSIS — M54.50 ACUTE RIGHT-SIDED LOW BACK PAIN WITHOUT SCIATICA: Primary | ICD-10-CM

## 2021-04-19 DIAGNOSIS — Z23 ENCOUNTER FOR IMMUNIZATION: Primary | ICD-10-CM

## 2021-04-19 PROCEDURE — 91300 SARS-COV-2 / COVID-19 MRNA VACCINE (PFIZER-BIONTECH) 30 MCG: CPT

## 2021-04-19 PROCEDURE — 0002A SARS-COV-2 / COVID-19 MRNA VACCINE (PFIZER-BIONTECH) 30 MCG: CPT

## 2021-04-19 NOTE — TELEPHONE ENCOUNTER
Voice mail/message left requesting patient to return call to [employer] program including our hours of business and phone number  Kindly asked to LM with Full Name,  and Reminded CB will come from a non- number as the nurses are working remotely/off-site      Referral deferred for f/u attempt per protocol

## 2021-04-19 NOTE — TELEPHONE ENCOUNTER
Additional Information   Negative: Is this related to a work injury?  Negative: Is this related to an MVA?  Negative: Are you currently recieving homecare services?  Negative: Has the patient had unexplained weight loss?  Negative: Does the patient have a fever?  Negative: Is the patient experiencing blood in sputum?  Negative: Is the patient experiencing urine retention?  Negative: Is the patient experiencing acute drop foot or paralysis?  Negative: Has the patient experienced major trauma? (fall from height, high speed collision, direct blow to spine) and is also experiencing nausea, light-headedness, or loss of consciousness?  Negative: Is this a chronic condition? Background - Initial Assessment  Clinical complaint: right lower back pain  No numbness or tingling  History of sciatica  Patient states the pain started in the right flank and radiated to the abd  Patient states he has a history of kidney stones and thought that he had a stone again  ER work up was negative for stones  Date of onset: 4/16  Frequency of pain: intermittent  Quality of pain: aching and sharp    Protocols used: [facility] COMPREHENSIVE SPINE PROGRAM PROTOCOL    This RN did review in detail the Comprehensive Spine Program and what we can provide for their back pain  Patient is agreeable to being triaged by this RN and would like to proceed with Physical Therapy  Referral was placed for Physical Therapy at the [facility] site  Patients information was sent to the  to make evaluation appointment  Patient made aware that the PT office  will be calling to schedule the appointment  Patient was provided with the phone number to the PT office  No further questions and/or concerns were voiced by the patient at this time  Patient states understanding of the referral that was placed  Referral Closed

## 2021-04-21 ENCOUNTER — TELEPHONE (OUTPATIENT)
Dept: PHYSICAL THERAPY | Facility: OTHER | Age: 61
End: 2021-04-21

## 2021-04-27 ENCOUNTER — EVALUATION (OUTPATIENT)
Dept: PHYSICAL THERAPY | Facility: CLINIC | Age: 61
End: 2021-04-27
Payer: COMMERCIAL

## 2021-04-27 ENCOUNTER — TRANSCRIBE ORDERS (OUTPATIENT)
Dept: ADMINISTRATIVE | Facility: HOSPITAL | Age: 61
End: 2021-04-27

## 2021-04-27 ENCOUNTER — LAB (OUTPATIENT)
Dept: LAB | Facility: HOSPITAL | Age: 61
End: 2021-04-27
Payer: COMMERCIAL

## 2021-04-27 VITALS — SYSTOLIC BLOOD PRESSURE: 168 MMHG | DIASTOLIC BLOOD PRESSURE: 110 MMHG | HEART RATE: 54 BPM

## 2021-04-27 DIAGNOSIS — M54.50 ACUTE RIGHT-SIDED LOW BACK PAIN WITHOUT SCIATICA: ICD-10-CM

## 2021-04-27 PROCEDURE — 97112 NEUROMUSCULAR REEDUCATION: CPT | Performed by: PHYSICAL THERAPIST

## 2021-04-27 PROCEDURE — 97162 PT EVAL MOD COMPLEX 30 MIN: CPT | Performed by: PHYSICAL THERAPIST

## 2021-04-27 PROCEDURE — 87086 URINE CULTURE/COLONY COUNT: CPT

## 2021-04-27 NOTE — TELEPHONE ENCOUNTER
Additional Information   Negative: Is this related to a work injury?  Negative: Is this related to an MVA?  Negative: Are you currently recieving homecare services?  Negative: Has the patient had unexplained weight loss?  Negative: Does the patient have a fever?  Negative: Is the patient experiencing urine retention?  Negative: Has the patient experienced major trauma? (fall from height, high speed collision, direct blow to spine) and is also experiencing nausea, light-headedness, or loss of consciousness?  Negative: Is the patient experiencing blood in sputum?     Protocols used:  [facility] COMPREHENSIVE SPINE PROGRAM PROTOCOL

## 2021-04-27 NOTE — PROGRESS NOTES
PT Evaluation     Today's date: 2021  Patient name: Mirella Busby  : 1960  MRN: [id number]  Referring provider: Hiral Escamilla PT  Dx:   Encounter Diagnosis     ICD-10-CM    1  Acute right-sided low back pain without sciatica  M54 5 Ambulatory referral to PT spine                  Assessment  Assessment details: Pt is a 64y o  year old male presenting to physical therapy for Acute right-sided low back pain without sciatica that began about 2 weeks ago  He presents via comp spine program is low risk based on Start Back Assessment tool  He presents today w lumbar hypomobility, TTP along L4-L5, LE weakness worse on the R, and limited lumbar AROM that limits his function with sitting extended periods, standing extended periods, bending, squatting, lifting  He will benefit from mobility based program w core stability training  Due to high BP and complex medical Hx pt may benefit from follow up with PCP for high BP  Pt will benefit from skilled physical therapy to address functional limitations noted in evaluation and meet patient goals  Impairments: abnormal muscle firing, abnormal or restricted ROM, abnormal movement, activity intolerance, impaired physical strength, lacks appropriate home exercise program, pain with function, poor posture  and poor body mechanics    Symptom irritability: moderateUnderstanding of Dx/Px/POC: good   Prognosis: good    Goals  ST  Pt will reduce pain to 3/10  2  Pt will demonstrate good TA activaiton    LT  Pt will improve lumbar flexion to min deficits or better to allow for forward bending  2  Pt will be I w HEP      Plan  Patient would benefit from: PT eval and skilled physical therapy  Referral necessary: Yes  Planned modality interventions: thermotherapy: hydrocollator packs, TENS, electrical stimulation/Russian stimulation and cryotherapy  Planned therapy interventions: abdominal trunk stabilization, joint mobilization, manual therapy, neuromuscular re-education, patient education, strengthening, stretching, therapeutic activities, therapeutic exercise, flexibility, functional ROM exercises and home exercise program  Frequency: 1x week  Duration in weeks: 3  Treatment plan discussed with: patient        Subjective Evaluation    History of Present Illness  Mechanism of injury: Pt reports waking up with belly pain that radiates to his low back that began about 2 Fridays ago  He went to the ER and had an MRI that showed no damage to his organs or stomach  He currently has low back pain that does not radiate into his legs, but he does have numbness and tingling into his feet that he noticed for a couple months  No trouble sleeping  Pt does not work and is disabled, he had aneurysm and stroke in 2014  Hx of cardiac issues and has had 2 strokes since 2014  He also has his gallbladder and appendix have been removed  Not a recurrent problem   Pain  Current pain ratin  At best pain ratin  At worst pain ratin  Quality: radiating, pressure and throbbing  Aggravating factors: sitting, standing, walking and lifting      Diagnostic Tests  MRI studies: normal  Treatments  Current treatment: medication and physical therapy  Patient Goals  Patient goals for therapy: decreased pain, increased motion, increased strength, independence with ADLs/IADLs and return to sport/leisure activities          Objective     Palpation   Left   Tenderness of the erector spinae, lumbar paraspinals and quadratus lumborum  Right   Muscle spasm in the quadratus lumborum  Tenderness of the erector spinae, lumbar paraspinals and quadratus lumborum       Neurological Testing     Reflexes   Left   Patellar (L4): normal (2+)  Achilles (S1): normal (2+)    Right   Patellar (L4): normal (2+)  Achilles (S1): normal (2+)    Active Range of Motion     Lumbar   Flexion:  with pain Restriction level: moderate  Left lateral flexion:  Restriction level: minimal  Right lateral flexion:  Restriction level: minimal  Left rotation:  Restriction level: moderate  Right rotation:  Restriction level: moderate    Joint Play     Hypomobile: T8, T9, T10, T11, T12, L1, L2, L3, L4, L5 and S1     Pain: L4 and L5     Strength/Myotome Testing     Lumbar   Left   Heel walk: normal  Toe walk: normal    Right   Heel walk: normal  Toe walk: normal    Left Hip   Planes of Motion   Flexion: 4    Right Hip   Planes of Motion   Flexion: 4-    Left Knee   Flexion: 4  Extension: 4+    Right Knee   Flexion: 4-  Extension: 4    Additional Strength Details  Poor squat mechanics w limited depth    Muscle Activation   Patient able to activate left transverse abdominals and right transverse abdominals  Additional Muscle Activation Details  Poor TA activation    Tests     Lumbar   Positive prone instability   Negative SIJ compression and sacral spring        Left   Negative crossed SLR, passive SLR and slump test      Right   Negative crossed SLR, passive SLR and slump test               Precautions: Cardiac and stroke Hx, High BP (168/110)    Date 4/27            Visit # IE            FOTO 47/68             Re-eval IE              Manuals 4/27                                                                Neuro Re-Ed             TA Bracing 5x10"            Bridges 2x10            Supine Marches 10x            Bird Dogs 5x LE only            Squats                                       Ther Ex             Bike             Side PLanks             SLR             S/l hip ABD             Leg press             Hamstring Stretch                                       Ther Activity                                       Gait Training                                       Modalities

## 2021-04-27 NOTE — TELEPHONE ENCOUNTER
Additional Information   Negative: Is this related to a work injury?     Protocols used: [facility] COMPREHENSIVE SPINE PROGRAM PROTOCOL

## 2021-04-27 NOTE — TELEPHONE ENCOUNTER
Additional Information   Negative: Is this related to a work injury?  Negative: Is this related to an MVA?  Negative: Are you currently recieving homecare services?  Negative: Has the patient had unexplained weight loss?  Negative: Does the patient have a fever?  Negative: Is the patient experiencing urine retention?  Negative: Has the patient experienced major trauma? (fall from height, high speed collision, direct blow to spine) and is also experiencing nausea, light-headedness, or loss of consciousness?  Negative: Is the patient experiencing blood in sputum?  Negative: Is this a chronic condition?     Protocols used: [facility] COMPREHENSIVE SPINE PROGRAM PROTOCOL

## 2021-04-27 NOTE — TELEPHONE ENCOUNTER
Additional Information   Negative: Is this related to a work injury?  Negative: Is this related to an MVA?  Negative: Are you currently recieving homecare services?  Negative: Has the patient had unexplained weight loss?  Negative: Does the patient have a fever?  Negative: Is the patient experiencing urine retention?  Negative: Has the patient experienced major trauma? (fall from height, high speed collision, direct blow to spine) and is also experiencing nausea, light-headedness, or loss of consciousness?     Protocols used:  AMB COMPREHENSIVE SPINE PROGRAM PROTOCOL

## 2021-04-27 NOTE — TELEPHONE ENCOUNTER
Additional Information   Negative: Is this related to a work injury?  Negative: Is this related to an MVA?  Negative: Are you currently recieving homecare services?  Negative: Has the patient had unexplained weight loss?  Negative: Does the patient have a fever?     Protocols used: [facility] COMPREHENSIVE SPINE PROGRAM PROTOCOL

## 2021-04-27 NOTE — TELEPHONE ENCOUNTER
Additional Information   Negative: Is this related to a work injury?  Negative: Is this related to an MVA?  Negative: Are you currently recieving homecare services?  Negative: Has the patient had unexplained weight loss?  Negative: Does the patient have a fever?  Negative: Is the patient experiencing urine retention?     Protocols used: [facility] COMPREHENSIVE SPINE PROGRAM PROTOCOL

## 2021-04-27 NOTE — TELEPHONE ENCOUNTER
Additional Information   Negative: Is this related to a work injury?  Negative: Is this related to an MVA?  Negative: Are you currently recieving homecare services?  Negative: Has the patient had unexplained weight loss?     Protocols used: [facility] COMPREHENSIVE SPINE PROGRAM PROTOCOL

## 2021-04-27 NOTE — TELEPHONE ENCOUNTER
Additional Information   Negative: Is this related to a work injury?  Negative: Is this related to an MVA?  Negative: Are you currently recieving homecare services?  Negative: Has the patient had unexplained weight loss?  Negative: Does the patient have a fever?  Negative: Is the patient experiencing urine retention?  Negative: Has the patient experienced major trauma? (fall from height, high speed collision, direct blow to spine) and is also experiencing nausea, light-headedness, or loss of consciousness?  Negative: Is the patient experiencing blood in sputum?  Negative: Is this a chronic condition?  Negative: Is the patient experiencing acute drop foot or paralysis?     Protocols used:  AMB COMPREHENSIVE SPINE PROGRAM PROTOCOL

## 2021-04-27 NOTE — TELEPHONE ENCOUNTER
Additional Information   Negative: Is this related to a work injury?  Negative: Is this related to an MVA?  Negative: Are you currently recieving homecare services?     Protocols used: [facility] COMPREHENSIVE SPINE PROGRAM PROTOCOL

## 2021-04-28 LAB — BACTERIA UR CULT: NORMAL

## 2021-04-30 ENCOUNTER — OFFICE VISIT (OUTPATIENT)
Dept: NEUROLOGY | Facility: CLINIC | Age: 61
End: 2021-04-30
Payer: COMMERCIAL

## 2021-04-30 VITALS
HEART RATE: 70 BPM | BODY MASS INDEX: 39.25 KG/M2 | DIASTOLIC BLOOD PRESSURE: 82 MMHG | WEIGHT: 259 LBS | SYSTOLIC BLOOD PRESSURE: 150 MMHG | HEIGHT: 68 IN

## 2021-04-30 DIAGNOSIS — G47.33 OSA (OBSTRUCTIVE SLEEP APNEA): ICD-10-CM

## 2021-04-30 DIAGNOSIS — H53.9 VISION CHANGES: ICD-10-CM

## 2021-04-30 DIAGNOSIS — R10.9 ABDOMINAL PAIN: ICD-10-CM

## 2021-04-30 DIAGNOSIS — Z86.73 HISTORY OF STROKE: Primary | ICD-10-CM

## 2021-04-30 DIAGNOSIS — M54.9 BACK PAIN: ICD-10-CM

## 2021-04-30 PROCEDURE — 99215 OFFICE O/P EST HI 40 MIN: CPT | Performed by: PSYCHIATRY & NEUROLOGY

## 2021-04-30 NOTE — PATIENT INSTRUCTIONS
Elliot Kaye,     Thank you for seeing us today! Here is a summary of our visit today:    - Vision: Follow up with MRI this weekend to make sure there is no new stroke and continue following up with ophthalmology     - Stroke: Continue aspirin, eliquis and statin     - Abdominal pain: Last hemoglobin and blood work was stable  Concerned of Voltaren use as it has a slight component that thins the blood  Concerned for complications of bleeding as you are also on two other blood thinning agents, aspirin and Eliquis  Risk isnt much but it is there  Recommend discussing with PCP  Follow up on lumbosacral MRI    - Weakness: MRI of the lumbosacral spine with and without contrast    - CORIE: Follow up with sleep medicine consultation  Discussed how sleep apnea is an independent risk factor for stroke and heart attack    - Hypertension: check  blood pressure more regulary with goal <130/80    Follow up in 3 months     If you get any new and sudden weakness, worsening vision, numbness/tingling, difficulty walking, please visit the nearest emergency department

## 2021-04-30 NOTE — PROGRESS NOTES
Patient ID: Chriss Nelson is a 64 y o  male  Assessment/Plan:    Vision: Follow up with MRI this weekend to make sure there is no new stroke and continue following up with ophthalmology     Stroke: Continue aspirin, eliquis and statin     Abdominal pain: Last hemoglobin and blood work was stable  Concerned of Voltaren use as it has a slight component that thins the blood  Concerned for complications of bleeding as patient is also on two other blood thinning agents, aspirin and Eliquis  Risk isnt much but it is there  Recommend discussing with PCP  Follow up on lumbosacral MRI    Weakness: MRI of the lumbosacral spine with and without contrast    CORIE: Follow up with sleep medicine consultation  Discussed how sleep apnea is an independent risk factor for stroke and heart attack    Hypertension: check  blood pressure more regulary with goal <130/80    Follow up in 3 months     If you get any new and sudden weakness, worsening vision, numbness/tingling, difficulty walking, please visit the nearest emergency department  Diagnoses and all orders for this visit:    CORIE (obstructive sleep apnea)    Vision changes    History of stroke  -     MRI lumbar spine w wo contrast; Future    Abdominal pain  -     MRI lumbar spine w wo contrast; Future    Back pain  -     MRI lumbar spine w wo contrast; Future         Subjective:    HPI    patient is a 68-year-old male with past medical history of stroke who presents for follow-up  Patient today presents with complaints of vision changes, abdominal pain and weakness of the legs  Vision:  Patient endorses having a spot in the lateral superior visual field that he cannot see cannot see much from  This is present with both eyes open and  With each eye individually closed  This started acutely approximately 1 month ago while he was playing video games    He saw Ophthalmology who did not find any abnormalities on the workup and ordered an MRI of the brain  That is scheduled for this weekend  Abdominal pain:  Patient endorses having abdominal pain which he feels has a bandlike sensation going horizontally across and under his belly  Patient also endorses that another day he woke up with excruciating pain radiating from his back possibly associated with plank exercises he was doing  He endorses feeling weak in his back  He had previous workup done including a CT of the abdomen which was negative  He also had a UA done to check for bladder infection which was negative  Patient now states that the pain in his stomach has gone away but the pain in his back still remained  He feels a tender spot in that area  Occassional blood in stool, not new  No blood in urine  Weakness in legs: Started a couple of months ago having weakness mostly in his hamstrings  Describes a weakness that causes difficulty walking and supporting himself  Weakness continuously present  Denies weakness that gets progressively worse with ambulation  He feels pain only when sitting down  No problems with bowel or bladder function  Stroke: Patient endorses taking aspirin and Eliquis regularly  No new symptoms aside from the above mentioned  He has a history of CORIE, not compliant with CPAP  Patient does not take his BP at home  Last HBA1C 5 5  Last chol 111 and LDL 66  The following portions of the patient's history were reviewed and updated as appropriate: allergies, current medications, past family history, past medical history, past social history, past surgical history and problem list and ros  Objective: There were no vitals taken for this visit  Physical Exam  Constitutional:       General: He is awake  Eyes:      General: Lids are normal       Extraocular Movements: Extraocular movements intact  Pupils: Pupils are equal, round, and reactive to light  Neurological:      Mental Status: He is alert  Coordination: Romberg sign positive        Deep Tendon Reflexes:      Reflex Scores:       Bicep reflexes are 2+ on the right side and 2+ on the left side  Brachioradialis reflexes are 2+ on the right side and 2+ on the left side  Neurological Exam  Mental Status  Awake and alert  Cranial Nerves  CN II: Visual fields full to confrontation  CN III, IV, VI: Extraocular movements intact bilaterally  Normal lids and orbits bilaterally  Pupils equal round and reactive to light bilaterally  CN V: Facial sensation is normal   CN VII: Full and symmetric facial movement  CN VIII: Hearing is normal   CN IX, X: Palate elevates symmetrically  CN XI: Shoulder shrug strength is normal   CN XII: Tongue midline without atrophy or fasciculations  Normal visual fields   Motor                                               Right                     Left  Deltoid                                   5                          5   Biceps                                   5                          5   Triceps                                  5                          5   Iliopsoas                               5                          5   Quadriceps                           5                          5   Hamstring                             5                          5  Tenderness of the spine   Reflexes                                           Right                      Left  Brachioradialis                    2+                         2+  Biceps                                 2+                         2+  Hamstring                            2+                         2+    Gait  Casual gait is normal including stance, stride, and arm swing  Romberg is present  ROS:    Review of Systems   Constitutional: Negative  Negative for appetite change and fever  HENT: Negative  Negative for hearing loss, tinnitus, trouble swallowing and voice change  Eyes: Positive for visual disturbance  Negative for photophobia and pain  Respiratory: Negative  Negative for shortness of breath  Cardiovascular: Negative  Negative for palpitations  Gastrointestinal: Negative  Negative for nausea and vomiting  Stomach pain   Endocrine: Negative  Negative for cold intolerance  Genitourinary: Negative  Negative for dysuria, frequency and urgency  Musculoskeletal: Negative  Negative for myalgias and neck pain  Skin: Negative  Negative for rash  Allergic/Immunologic: Negative  Neurological: Positive for weakness (Legs)  Negative for dizziness, tremors (Left arm), seizures, syncope, facial asymmetry, speech difficulty, light-headedness, numbness and headaches  Hematological: Negative  Does not bruise/bleed easily  Psychiatric/Behavioral: Negative  Negative for confusion, hallucinations and sleep disturbance  All other systems reviewed and are negative  Past Medical History:   Diagnosis Date    Aneurysm of thoracic aorta ([facility] [address] )     last assessed 11/20/17    Anxiety     currently on no meds    Cardiac disease     Cholelithiasis     Chronic kidney disease     GERD (gastroesophageal reflux disease)     Headache due to anesthesia     Hyperlipidemia     Hypertension     Irritable bowel syndrome     Kidney stone     Palpitations     PONV (postoperative nausea and vomiting)     Shortness of breath     ? related to acid reflux    Sleep apnea     not sure    Stroke [facility]) 11/2014    TIA (transient ischemic attack)        Past Surgical History:   Procedure Laterality Date    AORTA SURGERY      thoracic - aneurysmorrhaphy    APPENDECTOMY      ASCENDING AORTIC ANEURYSM REPAIR      resolved 8/19/15    CHOLECYSTECTOMY      laparoscopic    CYSTOSCOPY      with removal of object- stent removal    KNEE ARTHROSCOPY Right 1996    with medial meniscus repair    LITHOTRIPSY      renal    IL FRAGMENT KIDNEY STONE/ ESWL Left 5/17/2018    Procedure: LITHROTRIPSY EXTRACORPORAL SHOCKWAVE (ESWL);   Surgeon: Ashlee Allen MD;  [facility];  Service: Urology    THUMB ARTHROSCOPY Right 1976    ligament repair       Social History     Socioeconomic History    Marital status:      Spouse name: Not on file    Number of children: Not on file    Years of education: Not on file    Highest education level: Not on file   Occupational History    Occupation: disabled     Social Needs    Financial resource strain: Not on file    Food insecurity     Worry: Not on file     Inability: Not on file   [employer] needs     Medical: Not on file     Non-medical: Not on file   Tobacco Use    Smoking status: Never Smoker    Smokeless tobacco: Never Used    Tobacco comment: no second hand smoke exposure   Substance and Sexual Activity    Alcohol use: Not Currently    Drug use: No    Sexual activity: Not on file   Lifestyle    Physical activity     Days per week: Not on file     Minutes per session: Not on file    Stress: Not on file   Relationships    Social connections     Talks on phone: Not on file     Gets together: Not on file     Attends Judaism service: Not on file     Active member of club or organization: Not on file     Attends meetings of clubs or organizations: Not on file     Relationship status: Not on file    Intimate partner violence     Fear of current or ex partner: Not on file     Emotionally abused: Not on file     Physically abused: Not on file     Forced sexual activity: Not on file   Other Topics Concern    Not on file   Social History Narrative    Caffeine use    Completed some college     as per [employer]       Family History   Problem Relation Age of Onset    Diverticulitis Mother         of colon    Nephrolithiasis Mother     Emphysema Father     Nephrolithiasis Sister     Heart attack Maternal Grandfather 72    Breast cancer Paternal Grandmother     Lung cancer Paternal Grandfather     Cancer Family     Coronary artery disease Family     Diabetes Family         sibling    Hypertension Family         sibling    Hernia Family         sibling         Current Outpatient Medications:     amLODIPine (NORVASC) 5 mg tablet, Take 1 tablet (5 mg total) by mouth daily, Disp: 90 tablet, Rfl: 1    apixaban (ELIQUIS) 5 mg, Take 1 tablet (5 mg total) by mouth 2 (two) times a day, Disp: 60 tablet, Rfl: 5    aspirin (ECOTRIN LOW STRENGTH) 81 mg EC tablet, Take 1 tablet (81 mg total) by mouth daily, Disp: 30 tablet, Rfl: 0    atorvastatin (LIPITOR) 40 mg tablet, TAKE 1 TABLET BY MOUTH DAILY WITH DINNER, Disp: 90 tablet, Rfl: 1    cholecalciferol (VITAMIN D3) 1,000 units tablet, Take 1 tablet (1,000 Units total) by mouth daily, Disp: 30 tablet, Rfl: 5    diazepam (VALIUM) 5 mg tablet, Take 2 tablets (10 mg total) by mouth 2 (two) times a day as needed for anxiety, Disp: 5 tablet, Rfl: 0    docusate sodium (COLACE) 100 mg capsule, Take 1 capsule (100 mg total) by mouth daily, Disp: 365 capsule, Rfl: 0    metoprolol succinate (TOPROL-XL) 50 mg 24 hr tablet, TAKE 3 TABLETS BY MOUTH EVERY DAY, Disp: 270 tablet, Rfl: 1    pantoprazole (PROTONIX) 40 mg tablet, TAKE 1 TABLET BY MOUTH EVERY DAY, Disp: 90 tablet, Rfl: 1    Allergies   Allergen Reactions    Losartan Angioedema    Bactrim [Sulfamethoxazole-Trimethoprim]     Lisinopril      Felt bad, was OK with Zestril brand name   Tramadol         There were no vitals taken for this visit

## 2021-05-01 ENCOUNTER — HOSPITAL ENCOUNTER (OUTPATIENT)
Dept: RADIOLOGY | Facility: HOSPITAL | Age: 61
Discharge: HOME | DRG: 065 | End: 2021-05-01
Attending: OPHTHALMOLOGY
Payer: COMMERCIAL

## 2021-05-01 ENCOUNTER — APPOINTMENT (INPATIENT)
Dept: RADIOLOGY | Facility: HOSPITAL | Age: 61
DRG: 065 | End: 2021-05-01
Payer: COMMERCIAL

## 2021-05-01 ENCOUNTER — HOSPITAL ENCOUNTER (INPATIENT)
Facility: HOSPITAL | Age: 61
LOS: 3 days | Discharge: HOME | DRG: 065 | End: 2021-05-04
Attending: EMERGENCY MEDICINE | Admitting: HOSPITALIST
Payer: COMMERCIAL

## 2021-05-01 DIAGNOSIS — I63.9 CVA (CEREBRAL VASCULAR ACCIDENT) (HCC): ICD-10-CM

## 2021-05-01 DIAGNOSIS — D68.61 ANTIPHOSPHOLIPID ANTIBODY WITH HYPERCOAGULABLE STATE (HCC): ICD-10-CM

## 2021-05-01 DIAGNOSIS — I63.9 STROKE (HCC): Primary | ICD-10-CM

## 2021-05-01 DIAGNOSIS — H53.40 VISUAL FIELD DEFECT: ICD-10-CM

## 2021-05-01 DIAGNOSIS — I48.0 PAROXYSMAL ATRIAL FIBRILLATION (HCC): ICD-10-CM

## 2021-05-01 DIAGNOSIS — H53.453 OTHER LOCALIZED VISUAL FIELD DEFECT, BILATERAL: ICD-10-CM

## 2021-05-01 LAB
ANION GAP SERPL CALCULATED.3IONS-SCNC: 7 MMOL/L (ref 4–13)
APTT PPP: 29 SECONDS (ref 23–37)
BASOPHILS # BLD AUTO: 0.04 THOUSANDS/ΜL (ref 0–0.1)
BASOPHILS NFR BLD AUTO: 0 % (ref 0–1)
BUN SERPL-MCNC: 12 MG/DL (ref 5–25)
CALCIUM SERPL-MCNC: 9.2 MG/DL (ref 8.3–10.1)
CHLORIDE SERPL-SCNC: 107 MMOL/L (ref 100–108)
CO2 SERPL-SCNC: 27 MMOL/L (ref 21–32)
CREAT SERPL-MCNC: 1.07 MG/DL (ref 0.6–1.3)
EOSINOPHIL # BLD AUTO: 0.26 THOUSAND/ΜL (ref 0–0.61)
EOSINOPHIL NFR BLD AUTO: 3 % (ref 0–6)
ERYTHROCYTE [DISTWIDTH] IN BLOOD BY AUTOMATED COUNT: 13.2 % (ref 11.6–15.1)
GFR SERPL CREATININE-BSD FRML MDRD: 75 ML/MIN/1.73SQ M
GLUCOSE SERPL-MCNC: 91 MG/DL (ref 65–140)
HCT VFR BLD AUTO: 43 % (ref 36.5–49.3)
HGB BLD-MCNC: 14.7 G/DL (ref 12–17)
IMM GRANULOCYTES # BLD AUTO: 0.05 THOUSAND/UL (ref 0–0.2)
IMM GRANULOCYTES NFR BLD AUTO: 1 % (ref 0–2)
INR PPP: 1.07 (ref 0.84–1.19)
LYMPHOCYTES # BLD AUTO: 1.67 THOUSANDS/ΜL (ref 0.6–4.47)
LYMPHOCYTES NFR BLD AUTO: 17 % (ref 14–44)
MCH RBC QN AUTO: 30.2 PG (ref 26.8–34.3)
MCHC RBC AUTO-ENTMCNC: 34.2 G/DL (ref 31.4–37.4)
MCV RBC AUTO: 89 FL (ref 82–98)
MONOCYTES # BLD AUTO: 0.7 THOUSAND/ΜL (ref 0.17–1.22)
MONOCYTES NFR BLD AUTO: 7 % (ref 4–12)
NEUTROPHILS # BLD AUTO: 6.96 THOUSANDS/ΜL (ref 1.85–7.62)
NEUTS SEG NFR BLD AUTO: 72 % (ref 43–75)
NRBC BLD AUTO-RTO: 0 /100 WBCS
PLATELET # BLD AUTO: 195 THOUSANDS/UL (ref 149–390)
PMV BLD AUTO: 9.4 FL (ref 8.9–12.7)
POTASSIUM SERPL-SCNC: 3.7 MMOL/L (ref 3.5–5.3)
PROTHROMBIN TIME: 13.9 SECONDS (ref 11.6–14.5)
RBC # BLD AUTO: 4.86 MILLION/UL (ref 3.88–5.62)
SODIUM SERPL-SCNC: 141 MMOL/L (ref 136–145)
WBC # BLD AUTO: 9.68 THOUSAND/UL (ref 4.31–10.16)

## 2021-05-01 PROCEDURE — 80048 BASIC METABOLIC PNL TOTAL CA: CPT | Performed by: EMERGENCY MEDICINE

## 2021-05-01 PROCEDURE — 85613 RUSSELL VIPER VENOM DILUTED: CPT | Performed by: PSYCHIATRY & NEUROLOGY

## 2021-05-01 PROCEDURE — 85610 PROTHROMBIN TIME: CPT | Performed by: EMERGENCY MEDICINE

## 2021-05-01 PROCEDURE — 99223 1ST HOSP IP/OBS HIGH 75: CPT | Performed by: HOSPITALIST

## 2021-05-01 PROCEDURE — 85705 THROMBOPLASTIN INHIBITION: CPT | Performed by: PSYCHIATRY & NEUROLOGY

## 2021-05-01 PROCEDURE — 70496 CT ANGIOGRAPHY HEAD: CPT

## 2021-05-01 PROCEDURE — 93005 ELECTROCARDIOGRAM TRACING: CPT

## 2021-05-01 PROCEDURE — 70553 MRI BRAIN STEM W/O & W/DYE: CPT

## 2021-05-01 PROCEDURE — 85306 CLOT INHIBIT PROT S FREE: CPT | Performed by: PSYCHIATRY & NEUROLOGY

## 2021-05-01 PROCEDURE — 99285 EMERGENCY DEPT VISIT HI MDM: CPT

## 2021-05-01 PROCEDURE — 85670 THROMBIN TIME PLASMA: CPT | Performed by: PSYCHIATRY & NEUROLOGY

## 2021-05-01 PROCEDURE — 85732 THROMBOPLASTIN TIME PARTIAL: CPT | Performed by: PSYCHIATRY & NEUROLOGY

## 2021-05-01 PROCEDURE — 81241 F5 GENE: CPT | Performed by: PSYCHIATRY & NEUROLOGY

## 2021-05-01 PROCEDURE — 99223 1ST HOSP IP/OBS HIGH 75: CPT | Performed by: PSYCHIATRY & NEUROLOGY

## 2021-05-01 PROCEDURE — 85300 ANTITHROMBIN III ACTIVITY: CPT | Performed by: PSYCHIATRY & NEUROLOGY

## 2021-05-01 PROCEDURE — 36415 COLL VENOUS BLD VENIPUNCTURE: CPT | Performed by: EMERGENCY MEDICINE

## 2021-05-01 PROCEDURE — G1004 CDSM NDSC: HCPCS

## 2021-05-01 PROCEDURE — 70498 CT ANGIOGRAPHY NECK: CPT

## 2021-05-01 PROCEDURE — 85025 COMPLETE CBC W/AUTO DIFF WBC: CPT | Performed by: EMERGENCY MEDICINE

## 2021-05-01 PROCEDURE — 85303 CLOT INHIBIT PROT C ACTIVITY: CPT | Performed by: PSYCHIATRY & NEUROLOGY

## 2021-05-01 PROCEDURE — 85730 THROMBOPLASTIN TIME PARTIAL: CPT | Performed by: EMERGENCY MEDICINE

## 2021-05-01 PROCEDURE — 86146 BETA-2 GLYCOPROTEIN ANTIBODY: CPT | Performed by: PSYCHIATRY & NEUROLOGY

## 2021-05-01 PROCEDURE — 99285 EMERGENCY DEPT VISIT HI MDM: CPT | Performed by: EMERGENCY MEDICINE

## 2021-05-01 PROCEDURE — 81240 F2 GENE: CPT | Performed by: PSYCHIATRY & NEUROLOGY

## 2021-05-01 PROCEDURE — 86147 CARDIOLIPIN ANTIBODY EA IG: CPT | Performed by: PSYCHIATRY & NEUROLOGY

## 2021-05-01 PROCEDURE — A9585 GADOBUTROL INJECTION: HCPCS | Performed by: OPHTHALMOLOGY

## 2021-05-01 PROCEDURE — 85305 CLOT INHIBIT PROT S TOTAL: CPT | Performed by: PSYCHIATRY & NEUROLOGY

## 2021-05-01 RX ORDER — PANTOPRAZOLE SODIUM 40 MG/1
40 TABLET, DELAYED RELEASE ORAL
Status: DISCONTINUED | OUTPATIENT
Start: 2021-05-02 | End: 2021-05-04 | Stop reason: HOSPADM

## 2021-05-01 RX ORDER — AMLODIPINE BESYLATE 5 MG/1
5 TABLET ORAL DAILY
Status: DISCONTINUED | OUTPATIENT
Start: 2021-05-02 | End: 2021-05-04 | Stop reason: HOSPADM

## 2021-05-01 RX ORDER — ATORVASTATIN CALCIUM 40 MG/1
40 TABLET, FILM COATED ORAL EVERY EVENING
Status: DISCONTINUED | OUTPATIENT
Start: 2021-05-01 | End: 2021-05-01

## 2021-05-01 RX ORDER — ATORVASTATIN CALCIUM 40 MG/1
40 TABLET, FILM COATED ORAL
Status: DISCONTINUED | OUTPATIENT
Start: 2021-05-02 | End: 2021-05-04 | Stop reason: HOSPADM

## 2021-05-01 RX ORDER — ASPIRIN 81 MG/1
81 TABLET ORAL DAILY
Status: DISCONTINUED | OUTPATIENT
Start: 2021-05-02 | End: 2021-05-04 | Stop reason: HOSPADM

## 2021-05-01 RX ADMIN — APIXABAN 5 MG: 5 TABLET, FILM COATED ORAL at 20:47

## 2021-05-01 RX ADMIN — IOHEXOL 85 ML: 350 INJECTION, SOLUTION INTRAVENOUS at 22:35

## 2021-05-01 RX ADMIN — GADOBUTROL 10 ML: 604.72 INJECTION INTRAVENOUS at 14:33

## 2021-05-01 NOTE — H&P
[facility]  H&P- Keaton Rodriguez 1960, 64 y o  male MRN: [id number]  Unit/Bed#: ED 08 Encounter: [id number]  Primary Care Provider: Cj Childs MD   Date and time admitted to hospital: 5/1/2021  3:53 PM         [facility] Internal [facility] - History and Physical:       Keaton Rodriguez 64 y o  male MRN: [id number]  Unit/Bed#: ED 08 Encounter: [id number]  Admitting Physician: Mukesh Paige MD  PCP: Cj Childs MD  Date of Admission:  05/01/21        Assessment and Plan:     * Acute CVA (cerebrovascular accident) [facility])  Assessment & Plan  · MRI of the brain is reporting new areas of CVA both acute and subacute and reporting as embolic phenomenon  · Patient will need both Neurology and Cardiology consultations  · Discussed with neurology and for now will continue aspirin 81 mg plus Eliquis    Neurology will see the patient and then decide if any changes should be made  · Will initiate stroke protocol  · PT OT  · Will order 2D echocardiogram  · Neuro checks    Vision changes  Assessment & Plan  · Due to the above    Antiphospholipid antibody with hypercoagulable state [facility])  Assessment & Plan  Noted  · Will consult Hematology for anticoagulation recommendations    Atrial fibrillation ([address] )  Assessment & Plan  · Continue metoprolol for rate control  · Continue Eliquis for anticoagulation for now    CORIE (obstructive sleep apnea)  Assessment & Plan  · Patient does not wish for CPAP    Aortic valve stenosis  Assessment & Plan  Noted    History of stroke  Assessment & Plan  Noted    Obesity  Assessment & Plan  Noted    Hyperlipidemia  Assessment & Plan  · Continue statin    GERD (gastroesophageal reflux disease)  Assessment & Plan  · Continue Protonix    Hypertension  Assessment & Plan  · Continue metoprolol and Norvasc    H/O aortic aneurysm repair  Assessment & Plan  Noted            VTE Prophylaxis: Apixaban (Eliquis)  / sequential compression device   Code Status: full      Anticipated Length of Stay:  Patient will be admitted on an Inpatient basis with an anticipated length of stay of 2 midnights  Justification for Hospital Stay:  Acute CVA    Total Time for Visit, including Counseling / Coordination of Care: 30 minutes  Greater than 50% of this total time spent on direct patient counseling and coordination of care  Chief Complaint:   Visual changes    History of Present Illness:    Xi Garcia is a 64 y o  male who was referred to the ED to be evaluated further after MRI of the brain done today showed acute and subacute areas of CVA  The patient had an MRI done today because for the past month he has been having visual changes  It was ordered by his ophthalmologist that he went to go see to evaluate his changes in vision  The ophthalmologist determined that there is no primary ocular pathology that could explain his visual changes and ordered the MRI as part of a workup  The patient reported his vision being fuzzy in both eyes and he described it as "if seeing the static when turning off the television after watching it for an extensive period of time " He also stated that his bilateral lower extremities have gotten a little weaker for the past month  He stated that he has been compliant with his aspirin and Eliquis  He mentioned that he he use to be on Xarelto in the past but was taken off it for failed anticoagulation  He denied any slurred speech/facial droop/focal deficits/falls/headache  Review of Systems:    Review of Systems   Constitutional: Negative  HENT: Negative  Eyes: Positive for visual disturbance  Respiratory: Negative  Cardiovascular: Negative  Gastrointestinal: Negative  Endocrine: Negative  Genitourinary: Negative  Musculoskeletal: Negative  Skin: Negative  Neurological: Positive for weakness  Psychiatric/Behavioral: Negative          Past Medical and Surgical History:     Past Medical History: Diagnosis Date    Aneurysm of thoracic aorta ([facility] [address] )     last assessed 11/20/17    Anxiety     currently on no meds    Cardiac disease     Cholelithiasis     Chronic kidney disease     GERD (gastroesophageal reflux disease)     Headache due to anesthesia     Hyperlipidemia     Hypertension     Irritable bowel syndrome     Kidney stone     Palpitations     PONV (postoperative nausea and vomiting)     Shortness of breath     ? related to acid reflux    Sleep apnea     not sure    Stroke [facility]) 11/2014    TIA (transient ischemic attack)        Past Surgical History:   Procedure Laterality Date    AORTA SURGERY      thoracic - aneurysmorrhaphy    APPENDECTOMY      ASCENDING AORTIC ANEURYSM REPAIR      resolved 8/19/15    CHOLECYSTECTOMY      laparoscopic    CYSTOSCOPY      with removal of object- stent removal    KNEE ARTHROSCOPY Right 1996    with medial meniscus repair    LITHOTRIPSY      renal    MD FRAGMENT KIDNEY STONE/ ESWL Left 5/17/2018    Procedure: LITHROTRIPSY EXTRACORPORAL SHOCKWAVE (ESWL); Surgeon: Epifanio Ayala MD;  [facility];  Service: Urology    THUMB ARTHROSCOPY Right 1976    ligament repair       Meds/Allergies:    Prior to Admission medications    Medication Sig Start Date End Date Taking?  Authorizing Provider   amLODIPine (NORVASC) 5 mg tablet Take 1 tablet (5 mg total) by mouth daily 9/16/20   Jessie Mcgee MD   apixaban [facility]) 5 mg Take 1 tablet (5 mg total) by mouth 2 (two) times a day 6/25/20 4/30/21  Jessie Mcgee MD   aspirin (ECOTRIN LOW STRENGTH) 81 mg EC tablet Take 1 tablet (81 mg total) by mouth daily 3/27/20   Clarence Paige MD   atorvastatin (LIPITOR) 40 mg tablet TAKE 1 TABLET BY MOUTH DAILY WITH DINNER 3/24/21   Jessie Mcgee MD   cholecalciferol (VITAMIN D3) 1,000 units tablet Take 1 tablet (1,000 Units total) by mouth daily 3/30/20 4/30/21  Jessie Mcgee MD   diazepam (VALIUM) 5 mg tablet Take 2 tablets (10 mg total) by mouth 2 (two) times a day as needed for anxiety  Patient not taking: Reported on 4/30/2021 4/16/21   Pedro Pyle DO   docusate sodium (COLACE) 100 mg capsule Take 1 capsule (100 mg total) by mouth daily  Patient not taking: Reported on 4/30/2021 2/25/21   Gisel Coles MD   metoprolol succinate (TOPROL-XL) 50 mg 24 hr tablet TAKE 3 TABLETS BY MOUTH EVERY DAY 1/14/21   Gisel Coles MD   pantoprazole (PROTONIX) 40 mg tablet TAKE 1 TABLET BY MOUTH EVERY DAY 3/31/21   Gisel Coles MD     I have reviewed home medications with patient personally  Allergies: Allergies   Allergen Reactions    Losartan Angioedema    Bactrim [Sulfamethoxazole-Trimethoprim]     Lisinopril      Felt bad, was OK with Zestril brand name   Tramadol        Social History:     Marital Status:      Social History     Substance and Sexual Activity   Alcohol Use Not Currently     Social History     Tobacco Use   Smoking Status Never Smoker   Smokeless Tobacco Never Used   Tobacco Comment    no second hand smoke exposure     Social History     Substance and Sexual Activity   Drug Use No       Family History:    non-contributory    Physical Exam:     Vitals:   Blood Pressure: 153/73 (05/01/21 1606)  Pulse: 55 (05/01/21 1606)  Temperature: 98 °F (36 7 °C) (05/01/21 1606)  Respirations: 16 (05/01/21 1606)  SpO2: 98 % (05/01/21 1606)    Physical Exam  Constitutional:       Appearance: Normal appearance  HENT:      Head: Normocephalic and atraumatic  Eyes:      Extraocular Movements: Extraocular movements intact  Pupils: Pupils are equal, round, and reactive to light  Neck:      Musculoskeletal: Neck supple  Cardiovascular:      Rate and Rhythm: Regular rhythm  Bradycardia present  Pulmonary:      Effort: Pulmonary effort is normal       Breath sounds: Normal breath sounds  Abdominal:      General: Bowel sounds are normal       Palpations: Abdomen is soft  Musculoskeletal: Normal range of motion     Skin:     General: Skin is warm and dry  Neurological:      Mental Status: He is alert and oriented to person, place, and time  Psychiatric:         Mood and Affect: Mood normal          Additional Data:     Lab Results: I have personally reviewed pertinent reports  Results from last 7 days   Lab Units 05/01/21  1638   WBC Thousand/uL 9 68   HEMOGLOBIN g/dL 14 7   HEMATOCRIT % 43 0   PLATELETS Thousands/uL 195   NEUTROS PCT % 72   LYMPHS PCT % 17   MONOS PCT % 7   EOS PCT % 3         Results from last 7 days   Lab Units 05/01/21  1638   INR  1 07                   Imaging: I have personally reviewed pertinent reports  No orders to display       EKG, Pathology, and Other Studies Reviewed on Admission:   · EKG:  Sinus bradycardia    Allscripts / Epic Records Reviewed: Yes     ** Please Note: This note has been constructed using a voice recognition system   **

## 2021-05-01 NOTE — ASSESSMENT & PLAN NOTE
Assessment:  Teagan Rasmussen is a 64 y o   male with a pertinent past medical history consisting of thoracic aortic aneurysm repair in 2014, history of prior AFib postprocedural, prior embolic strokes, bicuspid aortic valve, hypertension, hyperlipidemia, previously on Xarelto and currently on Eliquis who presented on 05/01/2021 due to further evaluation after an MRI was done today which showed acute and subacute areas of stroke  · Pertinent past medical history   Patient was found to have a thoracic aortic aneurysm after screening exams, it was repaired in 2014 and was noted to have postprocedural AFib however given that there was no objective recurrence he was not initially started on anticoagulation, however hand 2020 patient was found to have a subacute infarcts predominantly in right posterior frontal and for that reason he was started on Xarelto  Patient was again seen in the hospital on 03/2020 at that time he came in with right hemiparesis MRI was significant for left frontal acute infarction which appear to be embolic it was thought that this was Xarelto failure and patient was started on Eliquis 5 mg b i d  5/2 CTA negative for LVO, MRI 5/2 show subacute infarction embolic in origin     · LDL- 67, cholesterol-117  · Impression:  Patient reports compliance with this medication this could be possibly due to Eliquis failure, switched to Coumadin 5/2    Plan:  · Continue warfarin with INR goal 2-3  · Pending NGUYỄN  · Thrombosis panel- pending  · Recommend blood pressure goals SBP is (140-180)  · Continue aspirin And Lipitor at this time  · Stat CT head in the event of change in neuro status

## 2021-05-01 NOTE — ASSESSMENT & PLAN NOTE
· MRI of the brain is reporting new areas of CVA both acute and subacute and reporting as embolic phenomenon  · Patient will need both Neurology and Cardiology consultations  · Discussed with neurology and for now will continue aspirin 81 mg plus Eliquis    Neurology will see the patient and then decide if any changes should be made  · Will initiate stroke protocol  · PT OT  · Will order 2D echocardiogram  · Neuro checks

## 2021-05-01 NOTE — ED PROVIDER NOTES
History  Chief Complaint   Patient presents with    Evaluation of Abnormal Diagnostic Test     Pt sent from MRI for eval of immediate finding  Ana Bernstein is a 58-year-old man sent in from MRI after having been found to have multiple acute and subacute infarcts  His primary complaint to his neurologist who ordered the MRI was visual field deficits  He was examined by his eye doctor who could not explain these deficits  The symptoms have been going on for the past month  He also reports being confused at times over the last month  No new numbness or weakness  He has a history of 3 previous strokes, the last of which was in March 2020  He currently takes Eliquis, previously failed Xarelto  His neurologist is Dr Jon Mullins  On review of systems, he reports diffuse abdominal pain which has been present for several weeks  It is growing in severity  He has shortness of breath which is constant since his last stroke  Reports no nausea, vomiting, chest pain  Prior to Admission Medications   Prescriptions Last Dose Informant Patient Reported? Taking?    amLODIPine (NORVASC) 5 mg tablet 5/1/2021 at Unknown time Self No Yes   Sig: Take 1 tablet (5 mg total) by mouth daily   apixaban (ELIQUIS) 5 mg  Self No No   Sig: Take 1 tablet (5 mg total) by mouth 2 (two) times a day   aspirin (ECOTRIN LOW STRENGTH) 81 mg EC tablet 5/1/2021 at Unknown time Self No Yes   Sig: Take 1 tablet (81 mg total) by mouth daily   atorvastatin (LIPITOR) 40 mg tablet 5/1/2021 at Unknown time  No Yes   Sig: TAKE 1 TABLET BY MOUTH DAILY WITH DINNER   cholecalciferol (VITAMIN D3) 1,000 units tablet  Self No No   Sig: Take 1 tablet (1,000 Units total) by mouth daily   diazepam (VALIUM) 5 mg tablet   No No   Sig: Take 2 tablets (10 mg total) by mouth 2 (two) times a day as needed for anxiety   Patient not taking: Reported on 4/30/2021   docusate sodium (COLACE) 100 mg capsule   No No   Sig: Take 1 capsule (100 mg total) by mouth daily   Patient not taking: Reported on 4/30/2021   metoprolol succinate (TOPROL-XL) 50 mg 24 hr tablet 5/1/2021 at Unknown time Self No Yes   Sig: TAKE 3 TABLETS BY MOUTH EVERY DAY   pantoprazole (PROTONIX) 40 mg tablet 5/1/2021 at Unknown time  No Yes   Sig: TAKE 1 TABLET BY MOUTH EVERY DAY      Facility-Administered Medications: None       Past Medical History:   Diagnosis Date    Aneurysm of thoracic aorta ([address] )     last assessed 11/20/17    Anxiety     currently on no meds    Cardiac disease     Cholelithiasis     Chronic kidney disease     GERD (gastroesophageal reflux disease)     Headache due to anesthesia     Hyperlipidemia     Hypertension     Irritable bowel syndrome     Kidney stone     Palpitations     PONV (postoperative nausea and vomiting)     Shortness of breath     ? related to acid reflux    Sleep apnea     not sure    [facility] 11/2014    TIA (transient ischemic attack)        Past Surgical History:   Procedure Laterality Date    AORTA SURGERY      thoracic - aneurysmorrhaphy    APPENDECTOMY      ASCENDING AORTIC ANEURYSM REPAIR      resolved 8/19/15    CHOLECYSTECTOMY      laparoscopic    CYSTOSCOPY      with removal of object- stent removal    KNEE ARTHROSCOPY Right 1996    with medial meniscus repair    LITHOTRIPSY      renal    [place] FRAGMENT KIDNEY STONE/ ESWL Left 5/17/2018    Procedure: LITHROTRIPSY EXTRACORPORAL SHOCKWAVE (ESWL);   Surgeon: Alex Snyder MD;  Location: [facility];  Service: Urology    THUMB ARTHROSCOPY Right 1976    ligament repair       Family History   Problem Relation Age of Onset    Diverticulitis Mother         of colon    Nephrolithiasis Mother     Emphysema Father     Nephrolithiasis Sister     Heart attack Maternal Grandfather 72    Breast cancer Paternal Grandmother     Lung cancer Paternal Grandfather     Cancer Family     Coronary artery disease Family     Diabetes Family         sibling    Hypertension Family sibling    Hernia Family         sibling     I have reviewed and agree with the history as documented  E-Cigarette/Vaping    E-Cigarette Use Never User      E-Cigarette/Vaping Substances    Nicotine No     THC No     CBD No     Flavoring No     Other No     Unknown No      Social History     Tobacco Use    Smoking status: Never Smoker    Smokeless tobacco: Never Used    Tobacco comment: no second hand smoke exposure   Substance Use Topics    Alcohol use: Not Currently    Drug use: No        Review of Systems   Constitutional: Negative for chills and fever  Eyes: Positive for visual disturbance  Respiratory: Positive for shortness of breath  Cardiovascular: Negative for chest pain and palpitations  Gastrointestinal: Positive for abdominal pain  Negative for blood in stool, diarrhea, nausea and vomiting  Musculoskeletal: Negative for gait problem  Neurological: Negative for syncope, facial asymmetry, speech difficulty, weakness, numbness and headaches  Psychiatric/Behavioral: Positive for confusion  All other systems reviewed and are negative  Physical Exam  ED Triage Vitals   Temperature Pulse Respirations Blood Pressure SpO2   05/01/21 1606 05/01/21 1606 05/01/21 1606 05/01/21 1606 05/01/21 1606   98 °F (36 7 °C) 55 16 153/73 98 %      Temp Source Heart Rate Source Patient Position - Orthostatic VS BP Location FiO2 (%)   05/01/21 2003 05/01/21 2003 -- 05/01/21 2003 --   Oral Monitor  Left arm       Pain Score       05/01/21 2001       No Pain             Orthostatic Vital Signs  Vitals:    05/01/21 1606 05/01/21 2003   BP: 153/73 143/74   Pulse: 55 55       Physical Exam  Vitals signs reviewed  Constitutional:       General: He is not in acute distress  Appearance: He is not ill-appearing or toxic-appearing  HENT:      Head: Normocephalic and atraumatic        Right Ear: External ear normal       Left Ear: External ear normal       Nose: Nose normal       Mouth/Throat: Mouth: Mucous membranes are moist    Eyes:      General: No scleral icterus  Extraocular Movements: Extraocular movements intact  Conjunctiva/sclera: Conjunctivae normal       Pupils: Pupils are equal, round, and reactive to light  Cardiovascular:      Rate and Rhythm: Regular rhythm  Bradycardia present  Pulses: Normal pulses  Heart sounds: Normal heart sounds  Pulmonary:      Effort: Pulmonary effort is normal       Breath sounds: Normal breath sounds  Abdominal:      General: There is distension  Palpations: Abdomen is soft  Tenderness: There is no guarding  Comments: LLQ tenderness  Musculoskeletal:         General: No deformity  Neurological:      General: No focal deficit present  Mental Status: He is alert and oriented to person, place, and time  Mental status is at baseline  Comments: CN2-12 intact  +5 strength and normal sensation in upper and lower extremities  Normal finger-to-nose  Normal visual fields  Normal gait  No pronator drift              ED Medications  Medications   atorvastatin (LIPITOR) tablet 40 mg (has no administration in time range)   metoprolol succinate (TOPROL-XL) 24 hr tablet 150 mg (has no administration in time range)   pantoprazole (PROTONIX) EC tablet 40 mg (has no administration in time range)   apixaban (ELIQUIS) tablet 5 mg (5 mg Oral Given 5/1/21 2047)   aspirin (ECOTRIN LOW STRENGTH) EC tablet 81 mg (has no administration in time range)   amLODIPine (NORVASC) tablet 5 mg (has no administration in time range)       Diagnostic Studies  Results Reviewed     Procedure Component Value Units Date/Time    Basic metabolic panel [[id number]] Collected: 05/01/21 1638    Lab Status: Final result Specimen: Blood from Arm, Right Updated: 05/01/21 1721     Sodium 141 mmol/L      Potassium 3 7 mmol/L      Chloride 107 mmol/L      CO2 27 mmol/L      ANION GAP 7 mmol/L      BUN 12 mg/dL      Creatinine 1 07 mg/dL      Glucose 91 mg/dL Calcium 9 2 mg/dL      eGFR 75 ml/min/1 73sq m     Narrative:      National Kidney Disease Foundation guidelines for Chronic Kidney Disease (CKD):     Stage 1 with normal or high GFR (GFR > 90 mL/min/1 73 square meters)    Stage 2 Mild CKD (GFR = 60-89 mL/min/1 73 square meters)    Stage 3A Moderate CKD (GFR = 45-59 mL/min/1 73 square meters)    Stage 3B Moderate CKD (GFR = 30-44 mL/min/1 73 square meters)    Stage 4 Severe CKD (GFR = 15-29 mL/min/1 73 square meters)    Stage 5 End Stage CKD (GFR <15 mL/min/1 73 square meters)  Note: GFR calculation is accurate only with a steady state creatinine    Protime-INR [[id number]]  (Normal) Collected: 05/01/21 1638    Lab Status: Final result Specimen: Blood from Arm, Right Updated: 05/01/21 1709     Protime 13 9 seconds      INR 1 07    APTT [[id number]]  (Normal) Collected: 05/01/21 1638    Lab Status: Final result Specimen: Blood from Arm, Right Updated: 05/01/21 1709     PTT 29 seconds     CBC and differential [[id number]] Collected: 05/01/21 1638    Lab Status: Final result Specimen: Blood from Arm, Right Updated: 05/01/21 1652     WBC 9 68 Thousand/uL      RBC 4 86 Million/uL      Hemoglobin 14 7 g/dL      Hematocrit 43 0 %      MCV 89 fL      MCH 30 2 pg      MCHC 34 2 g/dL      RDW 13 2 %      MPV 9 4 fL      Platelets 241 Thousands/uL      nRBC 0 /100 WBCs      Neutrophils Relative 72 %      Immat GRANS % 1 %      Lymphocytes Relative 17 %      Monocytes Relative 7 %      Eosinophils Relative 3 %      Basophils Relative 0 %      Neutrophils Absolute 6 96 Thousands/µL      Immature Grans Absolute 0 05 Thousand/uL      Lymphocytes Absolute 1 67 Thousands/µL      Monocytes Absolute 0 70 Thousand/µL      Eosinophils Absolute 0 26 Thousand/µL      Basophils Absolute 0 04 Thousands/µL                  CTA head and neck w wo contrast    (Results Pending)         Procedures  Procedures      ED Course                                       MDM  Number of Diagnoses or Management Options  Stroke [facility]):   Visual field defect:   Diagnosis management comments: 65 yo man presenting from MRI with subacute and acute strokes  Will assess in ED with coags, BMP, CBC, and ECG  Neurology contacted  Patient admitted for further stroke workup  Disposition  Final diagnoses:   Stroke [facility])   Visual field defect     Time reflects when diagnosis was documented in both MDM as applicable and the Disposition within this note     Time User Action Codes Description Comment    5/1/2021  4:31 PM Bia Hobart [I63 9] CVA (cerebral vascular accident) ([facility] [address] )     5/1/2021  4:32 PM Denise Brooking Add [I63 9] Stroke [facility])     5/1/2021  4:32 PM Denise Brooking Add [H53 40] Visual field defect     5/1/2021  4:32 PM Denise Brooking Modify [I63 9] CVA (cerebral vascular accident) ([facility] [address] )     5/1/2021  4:32 PM Delpha Priyanka [I63 9] Stroke ([address] )     5/1/2021  5:07 PM Crissie Prom Add [I48 0] Paroxysmal atrial fibrillation ([facility] [address] )     5/1/2021  5:07 PM Crissie Prom Add [D68 61] Antiphospholipid antibody with hypercoagulable state [facility])       ED Disposition     ED Disposition Condition Date/Time Comment    Admit Stable Sat May 1, 2021  4:31 PM Case was discussed with [facility] and the patient's admission status was agreed to be Admission Status: inpatient status to the service of Dr Gerber Moore           Follow-up Information    None         Current Discharge Medication List      CONTINUE these medications which have NOT CHANGED    Details   amLODIPine (NORVASC) 5 mg tablet Take 1 tablet (5 mg total) by mouth daily  Qty: 90 tablet, Refills: 1    Associated Diagnoses: Essential hypertension      aspirin (ECOTRIN LOW STRENGTH) 81 mg EC tablet Take 1 tablet (81 mg total) by mouth daily  Qty: 30 tablet, Refills: 0    Associated Diagnoses: Cerebrovascular accident (CVA) due to embolism of cerebral artery (HCC)      atorvastatin (LIPITOR) 40 mg tablet TAKE 1 TABLET BY MOUTH DAILY WITH DINNER  Qty: 90 tablet, Refills: 1 Associated Diagnoses: Hyperlipidemia      metoprolol succinate (TOPROL-XL) 50 mg 24 hr tablet TAKE 3 TABLETS BY MOUTH EVERY DAY  Qty: 270 tablet, Refills: 1    Associated Diagnoses: Essential hypertension      pantoprazole (PROTONIX) 40 mg tablet TAKE 1 TABLET BY MOUTH EVERY DAY  Qty: 90 tablet, Refills: 1    Associated Diagnoses: GERD (gastroesophageal reflux disease)      apixaban (ELIQUIS) 5 mg Take 1 tablet (5 mg total) by mouth 2 (two) times a day  Qty: 60 tablet, Refills: 5    Associated Diagnoses: Cerebrovascular accident (CVA) due to embolism of cerebral artery ([facility] [address] ); A-fib (HCC)      cholecalciferol (VITAMIN D3) 1,000 units tablet Take 1 tablet (1,000 Units total) by mouth daily  Qty: 30 tablet, Refills: 5    Associated Diagnoses: Vitamin D deficiency      diazepam (VALIUM) 5 mg tablet Take 2 tablets (10 mg total) by mouth 2 (two) times a day as needed for anxiety  Qty: 5 tablet, Refills: 0    Associated Diagnoses: Right flank pain      docusate sodium (COLACE) 100 mg capsule Take 1 capsule (100 mg total) by mouth daily  Qty: 365 capsule, Refills: 0    Associated Diagnoses: Chronic idiopathic constipation           No discharge procedures on file  PDMP Review     None           ED Provider  Attending physically available and evaluated [place] Easley I managed the patient along with the ED Attending      Electronically Signed by         Dina Desir MD  05/01/21 8629

## 2021-05-01 NOTE — CONSULTS
NEUROLOGY RESIDENCY CONSULT NOTE     Name: Jourdan Morillo   Age & Sex: 64 y o  male   MRN: [id number]  [facility]   Encounter: [id number]  Length of Stay: 0    ASSESSMENT & PLAN     * Acute CVA (cerebrovascular accident) [facility])  Assessment & Plan  Assessment:  Jourdan Morillo is a 64 y o   male with a pertinent past medical history consisting of thoracic aortic aneurysm repair in 2014, history of prior AFib postprocedural, prior embolic strokes, bicuspid aortic valve, hypertension, hyperlipidemia, previously on Xarelto and currently on Eliquis who presented on 05/01/2021 due to further evaluation after an MRI was done today which showed acute and subacute areas of stroke  · Pertinent past medical history   Patient was found to have a thoracic aortic aneurysm after screening exams, it was repaired in 2014 and was noted to have postprocedural AFib however given that there was no objective recurrence he was not initially started on anticoagulation, however hand 2020 patient was found to have a subacute infarcts predominantly in right posterior frontal and for that reason he was started on Xarelto  Patient was again seen in the hospital on 03/2020 at that time he came in with right hemiparesis MRI was significant for left frontal acute infarction which appear to be embolic it was thought that this was Xarelto failure and patient was started on Eliquis 5 mg b i d   · Impression:  Patient reports compliance with this medication this could be possibly due to Eliquis failure and may consider Coumadin however would like to complete workup prior to switching patient's anticoagulation    Remaining plan as noted below    Plan:  · Will obtain CTA head and neck  · Will recommend NGUYỄN, has not been repeated since 2014, patient will likely require to be NPO on Sunday night  · Will obtain thrombosis panel:  Rule out any other hypercoagulable processes  · Recommend blood pressure goals SBP is (140-180)  · Continue aspirin, Eliquis 5 mg b i d  And Lipitor at this time  · Stat CT head in the event of change in neuro status    H/O aortic aneurysm repair  Assessment & Plan  · Repaired in 2014 with possible postprocedural history of AFib  · Patient has failed Xarelto in 2020, and was placed on Eliquis 5 mg b i d   · He does follow-up with outpatient Cardiology        Recommendations for outpatient neurological follow up have yet to be determined  SUBJECTIVE     Reason for Consult / Principal Problem:  Stroke-like symptoms/stroke on MRI  Hx and PE limited by:  None    HPI: Mateo Burgos is a 64 y o   male with a pertinent past medical history consisting of thoracic aortic aneurysm repair in 2014, history of prior AFib postprocedural, prior embolic strokes bicuspid aortic valve, hypertension, hyperlipidemia, previously on Xarelto and currently on Eliquis who presented on 05/01/2021 due to further evaluation after an MRI was done today which showed acute and subacute areas of stroke  Patient had just seen outpatient Neurology the day prior, and he had been having visual changes so an ophthalmologist ordered his MRI  Per Ophthalmology there was no primary ocular pathology which would explain his vision changes and for that reason the MRI was ordered  Patient reports that for the past month he has been having a a fuzzy spot in his vision on both eyes  He denies any other associated numbness weakness or paresthesias slurred speech or headache  He also states that for the past 2 months he has been having bilateral lower extremity weakness however on further evaluation sounds more like tenderness  He also states that 2 weeks ago he was in the hospital for abdominal pain that was in a bandlike fashion at his belt line  This has improved and currently is only in his back  Patient reports compliance with all medications including Eliquis aspirin and Lipitor      During my examination patient states that he is a lifetime nonsmoker currently does not drink alcohol states that he has been compliant with his medications states that his overall weakness in his thighs has improved and when I asked him if this felt more like tenderness he agreed  Denies any other symptoms  On my exam he was noted to have 5/5 strength in all 4 extremities  His sensation was intact to pinprick as well as light touch  He has heel to shin as well as finger to nose did not suggest any dysmetria or ataxia  He continues to report the for the spot spot in both of his eyes   Remaining neurological exam as noted below      Of note patient follows up with Neurology as an outpatient as well as Cardiology  His initial stroke workup was started in 2017 when he presented with a transient left facial arm and thigh numbness however MRI was negative for acute stroke at that time he was treated as a TIA  He does have a reported history of stroke a fib since noted postop without objective reoccurance and he was not started on anticoagulation secondary to no objective recurrence  Per patient he states that he was not on any anticoagulation from 2014 until 2020  He had a MRI brain completed on 01/02/2020 which at that time was significant for subacute infarcts of the white matter in the right posterior frontal convexity at that time patient was started on Xarelto  Patient was seen again in the hospital on 03/2020 at the time he presented with right hemiparesis MRI brain showed a left frontal acute infarction which appear to be embolic potentially due to his possible AFib  Neurology saw the patient in the hospital at the time and during that visit rotations he had not had a loop placed and telemetry at that time was unremarkable however he has had 2 strokes" and at the time it was thought that this could be related to Xarelto failure and for that reason patient was started on Eliquis              Inpatient consult to Neurology     Date/Time 5/1/2021 6:53 PM Performed by  Genesis Briscoe MD     Authorized by Magan Bailon MD              Historical Information   Past Medical History:   Diagnosis Date    Aneurysm of thoracic aorta [facility])     last assessed 11/20/17    Anxiety     currently on no meds    Cardiac disease     Cholelithiasis     Chronic kidney disease     GERD (gastroesophageal reflux disease)     Headache due to anesthesia     Hyperlipidemia     Hypertension     Irritable bowel syndrome     Kidney stone     Palpitations     PONV (postoperative nausea and vomiting)     Shortness of breath     ? related to acid reflux    Sleep apnea     not sure    Stroke [facility]) 11/2014    TIA (transient ischemic attack)      Past Surgical History:   Procedure Laterality Date    AORTA SURGERY      thoracic - aneurysmorrhaphy    APPENDECTOMY      ASCENDING AORTIC ANEURYSM REPAIR      resolved 8/19/15    CHOLECYSTECTOMY      laparoscopic    CYSTOSCOPY      with removal of object- stent removal    KNEE ARTHROSCOPY Right 1996    with medial meniscus repair    LITHOTRIPSY      renal    CO FRAGMENT KIDNEY STONE/ ESWL Left 5/17/2018    Procedure: LITHROTRIPSY EXTRACORPORAL SHOCKWAVE (ESWL);   Surgeon: Renee Ruelas MD;  Location: [facility];  Service: Urology    THUMB ARTHROSCOPY Right 1976    ligament repair     Social History   Social History     Substance and Sexual Activity   Alcohol Use Not Currently     Social History     Substance and Sexual Activity   Drug Use No     E-Cigarette/Vaping    E-Cigarette Use Never User      E-Cigarette/Vaping Substances    Nicotine No     THC No     CBD No     Flavoring No     Other No     Unknown No      Social History     Tobacco Use   Smoking Status Never Smoker   Smokeless Tobacco Never Used   Tobacco Comment    no second hand smoke exposure     Family History:   Family History   Problem Relation Age of Onset    Diverticulitis Mother         of colon    Nephrolithiasis Mother     Emphysema Father    [place] Nephrolithiasis Sister     Heart attack Maternal Grandfather 72    Breast cancer Paternal Grandmother     Lung cancer Paternal Grandfather     Cancer Family     Coronary artery disease Family     Diabetes Family         sibling    Hypertension Family         sibling    Hernia Family         sibling     Meds/Allergies   all current active meds have been reviewed, current meds:   Current Facility-Administered Medications   Medication Dose Route Frequency    atorvastatin (LIPITOR) tablet 40 mg  40 mg Oral QPM    and PTA meds:   Prior to Admission Medications   Prescriptions Last Dose Informant Patient Reported? Taking? amLODIPine (NORVASC) 5 mg tablet  Self No No   Sig: Take 1 tablet (5 mg total) by mouth daily   apixaban (ELIQUIS) 5 mg  Self No No   Sig: Take 1 tablet (5 mg total) by mouth 2 (two) times a day   aspirin (ECOTRIN LOW STRENGTH) 81 mg EC tablet  Self No No   Sig: Take 1 tablet (81 mg total) by mouth daily   atorvastatin (LIPITOR) 40 mg tablet   No No   Sig: TAKE 1 TABLET BY MOUTH DAILY WITH DINNER   cholecalciferol (VITAMIN D3) 1,000 units tablet  Self No No   Sig: Take 1 tablet (1,000 Units total) by mouth daily   diazepam (VALIUM) 5 mg tablet   No No   Sig: Take 2 tablets (10 mg total) by mouth 2 (two) times a day as needed for anxiety   Patient not taking: Reported on 4/30/2021   docusate sodium (COLACE) 100 mg capsule   No No   Sig: Take 1 capsule (100 mg total) by mouth daily   Patient not taking: Reported on 4/30/2021   metoprolol succinate (TOPROL-XL) 50 mg 24 hr tablet  Self No No   Sig: TAKE 3 TABLETS BY MOUTH EVERY DAY   pantoprazole (PROTONIX) 40 mg tablet   No No   Sig: TAKE 1 TABLET BY MOUTH EVERY DAY      Facility-Administered Medications: None     Allergies   Allergen Reactions    Losartan Angioedema    Bactrim [Sulfamethoxazole-Trimethoprim]     Lisinopril      Felt bad, was OK with Zestril brand name   Tramadol        Review of previous medical records was  completed  Review of Systems   Constitutional: Negative for chills and fever  HENT: Negative for ear pain and sore throat  Eyes: Positive for visual disturbance  Negative for pain  Respiratory: Negative for cough and shortness of breath  Cardiovascular: Negative for chest pain and palpitations  Gastrointestinal: Negative for abdominal pain and vomiting  Genitourinary: Negative for dysuria and hematuria  Musculoskeletal: Negative for arthralgias and back pain  Skin: Negative for color change and rash  Neurological: Negative for dizziness, tremors, seizures and syncope  All other systems reviewed and are negative  OBJECTIVE     Patient ID: Mildred Carlson is a 64 y o  male  Vitals:   Vitals:    21 1606   BP: 153/73   Pulse: 55   Resp: 16   Temp: 98 °F (36 7 °C)   SpO2: 98%      There is no height or weight on file to calculate BMI  No intake or output data in the 24 hours ending 21 1901    Temperature:   Temp (24hrs), Av °F (36 7 °C), Min:98 °F (36 7 °C), Max:98 °F (36 7 °C)    Temperature: 98 °F (36 7 °C)    Invasive Devices: Invasive Devices     None                 Physical Exam  Vitals signs and nursing note reviewed  Constitutional:       Appearance: He is well-developed  HENT:      Head: Normocephalic and atraumatic  Eyes:      Conjunctiva/sclera: Conjunctivae normal       Pupils: Pupils are equal, round, and reactive to light  Neck:      Musculoskeletal: Neck supple  Pulmonary:      Effort: Pulmonary effort is normal  No respiratory distress  Abdominal:      Palpations: Abdomen is soft  Tenderness: There is no abdominal tenderness  Skin:     General: Skin is warm and dry  Neurological:      Mental Status: He is alert and oriented to person, place, and time        Coordination: Finger-Nose-Finger Test and Heel to [facility] Test normal       Deep Tendon Reflexes: Strength normal    Psychiatric:         Speech: Speech normal           Neurologic Exam Mental Status   Oriented to person, place, and time  Follows 2 step commands  Attention: normal    Speech: speech is normal   Level of consciousness: alert  Able to name object  Able to repeat  Cranial Nerves   Cranial nerves II through XII intact  CN II   Visual fields full to confrontation  CN III, IV, VI   Pupils are equal, round, and reactive to light  Nystagmus: none   Diplopia: none  Conjugate gaze: present    CN V   Facial sensation intact  CN XI   CN XI normal      CN XII   CN XII normal    Tongue: not atrophic  Fasciculations: absent  Tongue deviation: none    Motor Exam   Muscle bulk: normal  Overall muscle tone: normal  Right arm pronator drift: absent  Left arm pronator drift: absent    Strength   Strength 5/5 throughout  Right deltoid: 5/5  Left deltoid: 5/5  Right biceps: 5/5  Left biceps: 5/5  Right triceps: 5/5  Left triceps: 5/5  Right hamstrin/5  Left hamstrin/5  Right peroneal: 5/5  Left peroneal: 5/5  Right gastroc: 5/5  Left gastroc: 5/5Right Extremity   Deltoids: 5/5  Biceps :5/5  Triceps: 5/5  WF /WE: 5/5  Interosseous: 5/5  : 5/5  Hip flexors: 5/5  Knee Ext :5/5  Knee Flex: 5/5  Dorsiflexion 5/5  Plantarflexion: 5/5    Left Extremity   Deltoids: 5/5  Biceps :5/5  Triceps: 5/5  WF /WE: 5/5  Interosseous: 5/5  : 5/5  Hip flexors: 5/5  Knee Ext :5/5  Knee Flex: 5/5  Dorsiflexion 5/5  Plantarflexion: 5/5       Sensory Exam   Light touch normal      Gait, Coordination, and Reflexes     Coordination   Finger to nose coordination: normal  Heel to shin coordination: normal    Tremor   Resting tremor: absent  Intention tremor: absent  Action tremor: absent    Reflexes   Reflexes 2+ except as noted  Right plantar: normal  Left plantar: normal       LABORATORY DATA     Labs: I have personally reviewed pertinent reports      Results from last 7 days   Lab Units 21  1638   WBC Thousand/uL 9 68   HEMOGLOBIN g/dL 14 7   HEMATOCRIT % 43 0   PLATELETS Thousands/uL 195   NEUTROS PCT % 72   MONOS PCT % 7      Results from last 7 days   Lab Units 05/01/21  1638   POTASSIUM mmol/L 3 7   CHLORIDE mmol/L 107   CO2 mmol/L 27   BUN mg/dL 12   CREATININE mg/dL 1 07   CALCIUM mg/dL 9 2              Results from last 7 days   Lab Units 05/01/21  1638   INR  1 07   PTT seconds 29               IMAGING & DIAGNOSTIC TESTING     Radiology Results: I have personally reviewed pertinent reports  No orders to display       Other Diagnostic Testing: I have personally reviewed pertinent reports  ACTIVE MEDICATIONS     Current Facility-Administered Medications   Medication Dose Route Frequency    atorvastatin (LIPITOR) tablet 40 mg  40 mg Oral QPM       Prior to Admission medications    Medication Sig Start Date End Date Taking?  Authorizing Provider   amLODIPine (NORVASC) 5 mg tablet Take 1 tablet (5 mg total) by mouth daily 9/16/20   Cullen Melendez MD   [facility]) 5 mg Take 1 tablet (5 mg total) by mouth 2 (two) times a day 6/25/20 4/30/21  Cullen Melendez MD   aspirin (ECOTRIN LOW STRENGTH) 81 mg EC tablet Take 1 tablet (81 mg total) by mouth daily 3/27/20   Anamaria Rosales MD   atorvastatin (LIPITOR) 40 mg tablet TAKE 1 TABLET BY MOUTH DAILY WITH DINNER 3/24/21   Cullen Melendez MD   cholecalciferol (VITAMIN D3) 1,000 units tablet Take 1 tablet (1,000 Units total) by mouth daily 3/30/20 4/30/21  Cullen Melendez MD   diazepam (VALIUM) 5 mg tablet Take 2 tablets (10 mg total) by mouth 2 (two) times a day as needed for anxiety  Patient not taking: Reported on 4/30/2021 4/16/21   Randa Pyle DO   docusate sodium (COLACE) 100 mg capsule Take 1 capsule (100 mg total) by mouth daily  Patient not taking: Reported on 4/30/2021 2/25/21   Cullen Melendez MD   metoprolol succinate (TOPROL-XL) 50 mg 24 hr tablet TAKE 3 TABLETS BY MOUTH EVERY DAY 1/14/21   Cullen Melendez MD   pantoprazole (PROTONIX) 40 mg tablet TAKE 1 TABLET BY MOUTH EVERY DAY 3/31/21   Cullen Melendez MD CODE STATUS & ADVANCED DIRECTIVES     Code Status: Level 1 - Full Code  Advance Directive and Living Will:      Power of :    POLST:        VTE Pharmacologic Prophylaxis: Apixaban (Eliquis)  VTE Mechanical Prophylaxis: sequential compression device    ==  MD [facility] Neu[facility] Residency, PGY-2

## 2021-05-01 NOTE — ASSESSMENT & PLAN NOTE
· Repaired in 2014 with possible postprocedural history of AFib  · Patient has failed Xarelto in 2020, and was placed on Eliquis 5 mg b i d   · He does follow-up with outpatient Cardiology

## 2021-05-02 LAB
ALBUMIN SERPL BCP-MCNC: 3.4 G/DL (ref 3.5–5)
ALP SERPL-CCNC: 145 U/L (ref 46–116)
ALT SERPL W P-5'-P-CCNC: 44 U/L (ref 12–78)
ANION GAP SERPL CALCULATED.3IONS-SCNC: 4 MMOL/L (ref 4–13)
AST SERPL W P-5'-P-CCNC: 21 U/L (ref 5–45)
BILIRUB SERPL-MCNC: 1.85 MG/DL (ref 0.2–1)
BUN SERPL-MCNC: 12 MG/DL (ref 5–25)
CALCIUM ALBUM COR SERPL-MCNC: 9.3 MG/DL (ref 8.3–10.1)
CALCIUM SERPL-MCNC: 8.8 MG/DL (ref 8.3–10.1)
CHLORIDE SERPL-SCNC: 107 MMOL/L (ref 100–108)
CHOLEST SERPL-MCNC: 117 MG/DL (ref 50–200)
CO2 SERPL-SCNC: 28 MMOL/L (ref 21–32)
CREAT SERPL-MCNC: 1.08 MG/DL (ref 0.6–1.3)
DEPRECATED AT III PPP: 93 % OF NORMAL (ref 92–136)
ERYTHROCYTE [DISTWIDTH] IN BLOOD BY AUTOMATED COUNT: 13.2 % (ref 11.6–15.1)
EST. AVERAGE GLUCOSE BLD GHB EST-MCNC: 105 MG/DL
GFR SERPL CREATININE-BSD FRML MDRD: 74 ML/MIN/1.73SQ M
GLUCOSE SERPL-MCNC: 110 MG/DL (ref 65–140)
HBA1C MFR BLD: 5.3 %
HCT VFR BLD AUTO: 42.3 % (ref 36.5–49.3)
HDLC SERPL-MCNC: 38 MG/DL
HGB BLD-MCNC: 14.4 G/DL (ref 12–17)
LDLC SERPL CALC-MCNC: 64 MG/DL (ref 0–100)
MAGNESIUM SERPL-MCNC: 2.1 MG/DL (ref 1.6–2.6)
MCH RBC QN AUTO: 30.2 PG (ref 26.8–34.3)
MCHC RBC AUTO-ENTMCNC: 34 G/DL (ref 31.4–37.4)
MCV RBC AUTO: 89 FL (ref 82–98)
PLATELET # BLD AUTO: 171 THOUSANDS/UL (ref 149–390)
PMV BLD AUTO: 9.7 FL (ref 8.9–12.7)
POTASSIUM SERPL-SCNC: 3.7 MMOL/L (ref 3.5–5.3)
PROT SERPL-MCNC: 6.5 G/DL (ref 6.4–8.2)
RBC # BLD AUTO: 4.77 MILLION/UL (ref 3.88–5.62)
SODIUM SERPL-SCNC: 139 MMOL/L (ref 136–145)
TRIGL SERPL-MCNC: 73 MG/DL
WBC # BLD AUTO: 8.34 THOUSAND/UL (ref 4.31–10.16)

## 2021-05-02 PROCEDURE — 99232 SBSQ HOSP IP/OBS MODERATE 35: CPT | Performed by: INTERNAL MEDICINE

## 2021-05-02 PROCEDURE — 83036 HEMOGLOBIN GLYCOSYLATED A1C: CPT | Performed by: HOSPITALIST

## 2021-05-02 PROCEDURE — 97166 OT EVAL MOD COMPLEX 45 MIN: CPT

## 2021-05-02 PROCEDURE — 80053 COMPREHEN METABOLIC PANEL: CPT | Performed by: HOSPITALIST

## 2021-05-02 PROCEDURE — 99232 SBSQ HOSP IP/OBS MODERATE 35: CPT | Performed by: PSYCHIATRY & NEUROLOGY

## 2021-05-02 PROCEDURE — 83735 ASSAY OF MAGNESIUM: CPT | Performed by: HOSPITALIST

## 2021-05-02 PROCEDURE — 92610 EVALUATE SWALLOWING FUNCTION: CPT

## 2021-05-02 PROCEDURE — 80061 LIPID PANEL: CPT | Performed by: HOSPITALIST

## 2021-05-02 PROCEDURE — 85027 COMPLETE CBC AUTOMATED: CPT | Performed by: HOSPITALIST

## 2021-05-02 PROCEDURE — 97162 PT EVAL MOD COMPLEX 30 MIN: CPT

## 2021-05-02 RX ORDER — POTASSIUM CHLORIDE 20 MEQ/1
40 TABLET, EXTENDED RELEASE ORAL ONCE
Status: COMPLETED | OUTPATIENT
Start: 2021-05-02 | End: 2021-05-02

## 2021-05-02 RX ORDER — WARFARIN SODIUM 5 MG/1
5 TABLET ORAL
Status: DISCONTINUED | OUTPATIENT
Start: 2021-05-02 | End: 2021-05-03

## 2021-05-02 RX ADMIN — ASPIRIN 81 MG: 81 TABLET, COATED ORAL at 08:50

## 2021-05-02 RX ADMIN — POTASSIUM CHLORIDE 40 MEQ: 1500 TABLET, EXTENDED RELEASE ORAL at 11:51

## 2021-05-02 RX ADMIN — ATORVASTATIN CALCIUM 40 MG: 40 TABLET, FILM COATED ORAL at 16:41

## 2021-05-02 RX ADMIN — APIXABAN 5 MG: 5 TABLET, FILM COATED ORAL at 08:51

## 2021-05-02 RX ADMIN — METOPROLOL SUCCINATE 150 MG: 100 TABLET, EXTENDED RELEASE ORAL at 08:50

## 2021-05-02 RX ADMIN — WARFARIN SODIUM 5 MG: 5 TABLET ORAL at 22:11

## 2021-05-02 RX ADMIN — AMLODIPINE BESYLATE 5 MG: 5 TABLET ORAL at 08:51

## 2021-05-02 RX ADMIN — APIXABAN 5 MG: 5 TABLET, FILM COATED ORAL at 16:41

## 2021-05-02 RX ADMIN — PANTOPRAZOLE SODIUM 40 MG: 40 TABLET, DELAYED RELEASE ORAL at 05:49

## 2021-05-02 NOTE — ASSESSMENT & PLAN NOTE
Repaired in 2014 with possible postprocedural history of AFib  · Patient has failed Xarelto in 2020, and was placed on Eliquis 5 mg b i d   · He does follow-up with outpatient Cardiology

## 2021-05-02 NOTE — OCCUPATIONAL THERAPY NOTE
Occupational Therapy Evaluation     Patient Name: Keaton Rodriguez  IRGBA'X Date: 5/2/2021  Problem List  Principal Problem:    Acute CVA (cerebrovascular accident) [facility])  Active Problems:    H/O aortic aneurysm repair    Hypertension    GERD (gastroesophageal reflux disease)    Atrial fibrillation ([facility] )    Hyperlipidemia    Obesity    History of stroke    Antiphospholipid antibody with hypercoagulable state ([facility] )    CORIE (obstructive sleep apnea)    Past Medical History  Past Medical History:   Diagnosis Date    Aneurysm of thoracic aorta ([facility] )     last assessed 11/20/17    Anxiety     currently on no meds    Cardiac disease     Cholelithiasis     Chronic kidney disease     GERD (gastroesophageal reflux disease)     Headache due to anesthesia     Hyperlipidemia     Hypertension     Irritable bowel syndrome     Kidney stone     Palpitations     PONV (postoperative nausea and vomiting)     Shortness of breath     ? related to acid reflux    Sleep apnea     not sure    Stroke [facility]) 11/2014    TIA (transient ischemic attack)      Past Surgical History  Past Surgical History:   Procedure Laterality Date    AORTA SURGERY      thoracic - aneurysmorrhaphy    APPENDECTOMY      ASCENDING AORTIC ANEURYSM REPAIR      resolved 8/19/15    CHOLECYSTECTOMY      laparoscopic    CYSTOSCOPY      with removal of object- stent removal    KNEE ARTHROSCOPY Right 1996    with medial meniscus repair    LITHOTRIPSY      renal    ND FRAGMENT KIDNEY STONE/ ESWL Left 5/17/2018    Procedure: LITHROTRIPSY EXTRACORPORAL SHOCKWAVE (ESWL);   Surgeon: Mik Fonseca MD;  Location: [facility]  MAIN OR;  Service: Urology    THUMB ARTHROSCOPY Right 1976    ligament repair           05/02/21 1057   OT Last Visit   OT Visit Date 05/02/21   Note Type   Note type Evaluation   Restrictions/Precautions   Weight Bearing Precautions Per Order No   Other Precautions Telemetry;Pain   Pain Assessment   Pain Assessment Tool 0-10   Pain Score 4   Pain Location/Orientation Orientation: Bilateral;Location: Hip;Location: Leg;Location: Generalized   Pain Onset/Description Onset: Ongoing   Effect of Pain on Daily Activities Pain limits participation in meaningful daily activities   Patient's Stated Pain Goal No pain   Hospital Pain Intervention(s) Repositioned; Ambulation/increased activity; Emotional support   Home Living   Type of [address] [facility] Multi-level;1/2 bath on main level;Stairs to enter with rails   Bathroom Shower/Tub Tub/shower unit   Bathroom Toilet Standard   Bathroom Equipment   (pt denies)   [address]   (pt denies)   Additional Comments Pt reports living in 3-story home w/ 6 SHELDON w/ rails  Pt denies having DME in home and has a 2nd floor bedroom/bathroom set-up  Prior Function   Level of [place] Independent with ADLs and functional mobility   Lives With Family  (Sister)   Receives Help From Family   ADL Assistance Independent   IADLs Independent   Falls in the last 6 months 1 to 4  (1 on ice)   Vocational On disability   Lifestyle   Autonomy Pt reports IND in all ADL/IADL tasks PTA  Pt reports he drives and is on disability  Reciprocal Relationships Pt resides w/ sister who works cleaning home t/o day   Pt reports no other social supports nearby to assist    Service to Others On disability   Intrinsic Gratification Pt reports enjoying playing games, seeing friends, and painting   Psychosocial   Psychosocial (WDL) WDL   Subjective   Subjective "I have a little fuzzy spot in my vision always"   ADL   Where Assessed Chair   Eating Assistance 5  Supervision/Setup   Grooming Assistance 5  Supervision/Setup   UB Bathing Assistance 5  Supervision/Setup   LB Bathing Assistance 5  Supervision/Setup   [address] 5  Supervision/Setup   LB Dressing Assistance 5  [address] 5  Supervision/Setup   Bed Mobility   Supine to Sit 6  Modified independent   Additional items Bedrails; Increased time required   Additional Comments Pt was supine in bed upon OT arrival  Pt used bedrails and benefitted from increased time to complete supine>sit  Pt left in bedside chair w/ all needed items within reach  Transfers   Sit to Stand 6  Modified independent   Additional items Increased time required   Stand to Sit 6  Modified independent   Additional items Increased time required   Additional Comments Pt completed functional transfers w/ Mod I w/o AD  Pt benefits from increased time to complete transfers and demonstrates good safety awareness t/o  Functional Mobility   Functional Mobility 5  Supervision   Additional Comments Pt completed long-distance functional household mobility w/ S to enhance safety t/o  Pt benefits from increased time to complete functional mobility     Additional items   (No AD used)   Balance   Static Sitting Fair +   Dynamic Sitting Fair +   Static Standing Fair   Dynamic Standing Fair   Ambulatory Fair   Activity Tolerance   Activity Tolerance Patient limited by fatigue;Patient limited by pain   Medical Staff Made Aware OT Georgiana; PT Clayton Orourke; SPT Ana   Nurse Made Aware RN cleared pt for OT evaluation   RUE Assessment   RUE Assessment WFL  (resting tremor)   LUE Assessment   LUE Assessment WFL   Hand Function   Gross Motor Coordination Functional   Fine Motor Coordination Functional   Sensation   Light Touch No apparent deficits   Sharp/Dull No apparent deficits   Stereognosis No apparent deficits   Vision-Basic Assessment   Current Vision Wears glasses all the time   Patient Visual Report Blurring of print when reading  ("little fuzzy spot in vision constantly")   Vision - Complex Assessment   Ocular Range of Motion WFL   Head Position WDL   Tracking Able to track stimulus in all quads without difficulty   Saccades Decreased speed of saccadic movement   Acuity Able to read clock/calendar on wall without difficulty; Able to read employee name badge without difficulty   Visual Screen Results Decreased visual acuity   Additional Comments Pt described "fuzzy spot" in vision for past month  Pt reports it is getting better over time and that he'll often stop tasks and come back as needed if he is having trouble during reading  Cognition   Overall Cognitive Status WFL   Arousal/Participation Cooperative   Attention Within functional limits   Orientation Level Oriented X4   Memory Within functional limits   Following Commands Follows all commands and directions without difficulty   Comments Pt cooperative and agreeable to OT evaluation  Pt demonstrated good safety awareness and insight to current functional deficits  Pt benefits from increased time to complete tasks  No apparant processing deficits noted  Assessment   Limitation Decreased endurance;Decreased high-level ADLs; Visual deficit   Prognosis Good   Assessment Pt is a 64 y o  male who participated in an OT evaluation on 5/2/2021 after being admitted to [facility] on 5/1/2021 w/ acute CVA  Pt  has a past medical history of Aneurysm of thoracic aorta ([facility] ), Anxiety, Cardiac disease, Cholelithiasis, Chronic kidney disease, GERD (gastroesophageal reflux disease), Headache due to anesthesia, Hyperlipidemia, Hypertension, Irritable bowel syndrome, Kidney stone, Palpitations, PONV (postoperative nausea and vomiting), Shortness of breath, Sleep apnea, Stroke ([facility] ) (11/2014), and TIA (transient ischemic attack)  Acute and subacute infarct found  CT of head/neck pending  Pt with active OT orders and activity as tolerated orders  Pt resides in a 3 story home w/ 6 [address] w/ railings w/ his sister  Pt was I w/ ADL's and IADL's, drives, & requires no use of DME PTA  Currently pt is completing all ADL tasks w/ S and Mod I for functional transfers/functional mobility to enhance safety   Pt demonstrates good insight to current functional status and demonstrated good safety awareness t/o OT evaluation  Pt is limited at this time 2' pain, endurance and strength  The patient's raw score on the AM-PAC Daily Activity inpatient short form is 22, standardized score is 47 1, greater than 39 4  Patients at this level are likely to benefit from discharge to home  Please refer to the recommendation of the Occupational Therapist for safe discharge planning  Based on the aforementioned OT evaluation, functional performance deficits, and assessments; pt has been identified as moderate complexity evaluation  From OT standpoint, anticipate d/c home w/ outpatient OT services and increased social supports as needed  Goals   Patient Goals "To go home"   Recommendation   OT Discharge Recommendation Home with outpatient rehabilitation  (fitness to drive; vision therapy)   OT - OK to Discharge Yes  (when medically appropriate)   AM-PAC Daily Activity Inpatient   Lower Body Dressing 3   Bathing 3   Toileting 4   Upper Body Dressing 4   Grooming 4   Eating 4   Daily Activity Raw Score 22   Daily Activity Standardized Score (Calc for Raw Score >=11) 47  1   AM-PAC Applied Cognition Inpatient   Following a Speech/Presentation 3   Understanding Ordinary Conversation 4   Taking Medications 4   Remembering Where Things Are Placed or Put Away 4   Remembering List of 4-5 Errands 3   Taking Care of Complicated Tasks 3   Applied Cognition Raw Score 21   Applied Cognition Standardized Score 44 3   Barthel Index   Feeding 10   Bathing 5   Grooming Score 5   Dressing Score 10   Bladder Score 10   Bowels Score 10   Toilet Use Score 10   Transfers (Bed/Chair) Score 15   Mobility (Level Surface) Score 15   Stairs Score 10   Barthel Index Score 100   Modified [place] Scale   Modified [place] Scale 2          BYRON Valenzuela

## 2021-05-02 NOTE — ED ATTENDING ATTESTATION
5/1/2021  IJanis MD, saw and evaluated the patient  I have discussed the patient with the resident/non-physician practitioner and agree with the resident's/non-physician practitioner's findings, Plan of Care, and MDM as documented in the resident's/non-physician practitioner's note, except where noted  All available labs and Radiology studies were reviewed  I was present for key portions of any procedure(s) performed by the resident/non-physician practitioner and I was immediately available to provide assistance  At this point I agree with the current assessment done in the Emergency Department  I have conducted an independent evaluation of this patient a history and physical is as follows:    ED Course     55-year-old male, history of CVA, presenting to the emergency department after outpatient MRI demonstrated:    Acute and subacute infarcts are seen in the supra and infratentorial brain suggesting embolic phenomenon  There is advanced chronic microangiopathic changes as well as chronic infarcts in the left corona radiata  Patient states that he had MRI performed because of 1 month history of visual field defect which is in the left central vision  MRI was ordered by ophthalmologist   Patient denies any other neurologic symptoms  Progressive lower extremity weakness bilaterally  The patient is resting comfortably on a stretcher in no acute respiratory distress  The patient appears nontoxic  HEENT reveals moist mucous membranes  Head is normocephalic and atraumatic  Conjunctiva and sclera are normal  Neck is nontender and supple with full range of motion to flexion, extension, lateral rotation  No meningismus appreciated  No masses are appreciated  Lungs are clear to auscultation bilaterally without any wheezes, rales or rhonchi  Heart is regular rate and rhythm without any murmurs, rubs or gallops  Abdomen is soft and nontender without any rebound or guarding   Extremities appear grossly normal without any significant arthropathy  Patient is awake, alert, and oriented x3  The patient has normal interaction  GCS 15  Cranial nerves 2-12 are intact  Motor is 5 out of 5 bilateral upper lower extremities  Admit for further stroke workup      Labs Reviewed   PROTIME-INR - Normal       Result Value Ref Range Status    Protime 13 9  11 6 - 14 5 seconds Final    INR 1 07  0 84 - 1 19 Final   APTT - Normal    PTT 29  23 - 37 seconds Final    Comment: Therapeutic Heparin Range =  60-90 seconds   CBC AND DIFFERENTIAL    WBC 9 68  4 31 - 10 16 Thousand/uL Final    RBC 4 86  3 88 - 5 62 Million/uL Final    Hemoglobin 14 7  12 0 - 17 0 g/dL Final    Hematocrit 43 0  36 5 - 49 3 % Final    MCV 89  82 - 98 fL Final    MCH 30 2  26 8 - 34 3 pg Final    MCHC 34 2  31 4 - 37 4 g/dL Final    RDW 13 2  11 6 - 15 1 % Final    MPV 9 4  8 9 - 12 7 fL Final    Platelets 282  244 - 390 Thousands/uL Final    nRBC 0  /100 WBCs Final    Neutrophils Relative 72  43 - 75 % Final    Immat GRANS % 1  0 - 2 % Final    Lymphocytes Relative 17  14 - 44 % Final    Monocytes Relative 7  4 - 12 % Final    Eosinophils Relative 3  0 - 6 % Final    Basophils Relative 0  0 - 1 % Final    Neutrophils Absolute 6 96  1 85 - 7 62 Thousands/µL Final    Immature Grans Absolute 0 05  0 00 - 0 20 Thousand/uL Final    Lymphocytes Absolute 1 67  0 60 - 4 47 Thousands/µL Final    Monocytes Absolute 0 70  0 17 - 1 22 Thousand/µL Final    Eosinophils Absolute 0 26  0 00 - 0 61 Thousand/µL Final    Basophils Absolute 0 04  0 00 - 0 10 Thousands/µL Final   BASIC METABOLIC PANEL    Sodium 843  136 - 145 mmol/L Final    Potassium 3 7  3 5 - 5 3 mmol/L Final    Chloride 107  100 - 108 mmol/L Final    CO2 27  21 - 32 mmol/L Final    ANION GAP 7  4 - 13 mmol/L Final    BUN 12  5 - 25 mg/dL Final    Creatinine 1 07  0 60 - 1 30 mg/dL Final    Comment: Standardized to IDMS reference method    Glucose 91  65 - 140 mg/dL Final    Comment: If the patient is fasting, the ADA then defines impaired fasting glucose as > 100 mg/dL and diabetes as > or equal to 123 mg/dL  Specimen collection should occur prior to Sulfasalazine administration due to the potential for falsely depressed results  Specimen collection should occur prior to Sulfapyridine administration due to the potential for falsely elevated results      Calcium 9 2  8 3 - 10 1 mg/dL Final    eGFR 75  ml/min/1 73sq m Final    Narrative:     [place] guidelines for Chronic Kidney Disease (CKD):     Stage 1 with normal or high GFR (GFR > 90 mL/min/1 73 square meters)    Stage 2 Mild CKD (GFR = 60-89 mL/min/1 73 square meters)    Stage 3A Moderate CKD (GFR = 45-59 mL/min/1 73 square meters)    Stage 3B Moderate CKD (GFR = 30-44 mL/min/1 73 square meters)    Stage 4 Severe CKD (GFR = 15-29 mL/min/1 73 square meters)    Stage 5 End Stage CKD (GFR <15 mL/min/1 73 square meters)  Note: GFR calculation is accurate only with a steady state creatinine       CTA head and neck w wo contrast    (Results Pending)                 Critical Care Time  Procedures

## 2021-05-02 NOTE — PROGRESS NOTES
NEUROLOGY RESIDENCY PROGRESS NOTE     Name: Jt Daily   Age & Sex: 64 y o  male   MRN: [id number]  [facility]   Encounter: [id number]    Jt Daily will need follow up in in 4 weeks with neurovascular attending or advance practitioner  ASSESSMENT & PLAN     * Acute CVA (cerebrovascular accident) [facility])  Assessment & Plan  Assessment:  Jt Daily is a 64 y o   male with a pertinent past medical history consisting of thoracic aortic aneurysm repair in 2014, history of prior AFib postprocedural, prior embolic strokes, bicuspid aortic valve, hypertension, hyperlipidemia, previously on Xarelto and currently on Eliquis who presented on 05/01/2021 due to further evaluation after an MRI was done today which showed acute and subacute areas of stroke  · Pertinent past medical history   Patient was found to have a thoracic aortic aneurysm after screening exams, it was repaired in 2014 and was noted to have postprocedural AFib however given that there was no objective recurrence he was not initially started on anticoagulation, however hand 2020 patient was found to have a subacute infarcts predominantly in right posterior frontal and for that reason he was started on Xarelto  Patient was again seen in the hospital on 03/2020 at that time he came in with right hemiparesis MRI was significant for left frontal acute infarction which appear to be embolic it was thought that this was Xarelto failure and patient was started on Eliquis 5 mg b i d   · Impression:  Patient reports compliance with this medication this could be possibly due to Eliquis failure and may consider Coumadin however would like to complete workup prior to switching patient's anticoagulation    Remaining plan as noted below    Plan:  · Will obtain CTA head and neck  · Will recommend NGUYỄN, has not been repeated since 2014, patient will likely require to be NPO on Sunday night  · Will obtain thrombosis panel:  Rule out any other hypercoagulable processes  · Recommend blood pressure goals SBP is (140-180)  · Continue aspirin, Eliquis 5 mg b i d  And Lipitor at this time  · Stat CT head in the event of change in neuro status    H/O aortic aneurysm repair  Assessment & Plan  · Repaired in  with possible postprocedural history of AFib  · Patient has failed Xarelto in , and was placed on Eliquis 5 mg b i d   · He does follow-up with outpatient Cardiology      SUBJECTIVE     Patient was seen and examined  No acute events overnight  He continues to have a spot in his visual field if looking from right eye or left eye or both eyes open  His abdominal pain is much better  He also reports pain in his bilateral thigh region which is going on for months  No focal weakness    Pertinent Negatives include: headaches, paralysis/weakness, numbness or tingling, involuntary movements, tremor, speech impairment     OBJECTIVE     Patient ID: Jourdan Morillo is a 64 y o  male  Vitals:    21 0409 21 0613 21 0615 21 0749   BP: 122/69 142/89 142/89 142/89   Pulse: 68 65 59 71   Resp:    16   Temp:  97 9 °F (36 6 °C) 97 9 °F (36 6 °C) 98 3 °F (36 8 °C)   TempSrc:       SpO2: 94% 94% 96% 93%   Weight:       Height:          Temperature:   Temp (24hrs), Av 3 °F (36 8 °C), Min:97 9 °F (36 6 °C), Max:99 °F (37 2 °C)    Temperature: 98 3 °F (36 8 °C)      Physical Exam     Neurological Examination:     Mental Status: The patient was awake, alert, attentive, oriented to person, place, and time  No dysarthria or aphasia noted    Cranial Nerves:   I: smell Not tested   II: visual fields Full to confrontation  Pupils equal, round, reactive to light with normal accomodation  Patient reports a spot in his visual field with either or both eyes open  III,IV,VI: extraocular muscles EOMI, no nystagmus   V: masseter and pterygoid strength full   Sensation in the V1 through V3 distributions intact to pinprick and light touch bilaterally  VII: Face is symmetric with no weakness noted  VIII: Audition intact to finger rub bilaterally  IX/X: Uvula midline  Soft palate elevation symmetric  XI: Trapezius and SCM strength 5/5 B/L  XII: Tongue midline with no atrophy or fasciculations with appropriate movement  Motor Examination:   No pronator drift  Bulk: Normal  No atrophy Tone: Normal  Fasciculations: None  Deltoid Biceps Triceps WE   WF   FF IO     Right        5         5          5         5      5      5   5        Left           5        5          5          5      5     5   5                       IP        Quad   Ham     TA       Gastroc   Right      5            5          5         5                5  Left         5            5         5         5                5       Reflexes:                   Biceps Brachioradialis Triceps Patella Achilles Plantars   Right          2+            2+                  2+        2+       2+         Down   Left            2+             2+                 2+         2+       2+         Down     Clonus: None    Coordination: Patient able to perform normal finger-to-nose and heel to shin appropriately  Normal rapid alternating movements  Sensory: Normal sensation to light touch, pin prick and vibratory sensation throughout  Gait:  Not tested    LABORATORY DATA     Labs: I have personally reviewed pertinent reports      Results from last 7 days   Lab Units 05/02/21  0558 05/01/21  1638   WBC Thousand/uL 8 34 9 68   HEMOGLOBIN g/dL 14 4 14 7   HEMATOCRIT % 42 3 43 0   PLATELETS Thousands/uL 171 195   NEUTROS PCT %  --  72   MONOS PCT %  --  7      Results from last 7 days   Lab Units 05/02/21  0558 05/01/21  1638   SODIUM mmol/L 139 141   POTASSIUM mmol/L 3 7 3 7   CHLORIDE mmol/L 107 107   CO2 mmol/L 28 27   BUN mg/dL 12 12   CREATININE mg/dL 1 08 1 07   CALCIUM mg/dL 8 8 9 2   ALK PHOS U/L 145*  --    ALT U/L 44  --    AST U/L 21  --      Results from last 7 days   Lab Units 05/02/21  0558   MAGNESIUM mg/dL 2 1          Results from last 7 days   Lab Units 05/01/21  1638   INR  1 07   PTT seconds 29               IMAGING & DIAGNOSTIC TESTING     Radiology Results: I have personally reviewed pertinent reports  CTA head and neck w wo contrast    (Results Pending)       Other Diagnostic Testing: I have personally reviewed pertinent reports  ACTIVE MEDICATIONS     Current Facility-Administered Medications   Medication Dose Route Frequency    amLODIPine (NORVASC) tablet 5 mg  5 mg Oral Daily    apixaban (ELIQUIS) tablet 5 mg  5 mg Oral BID    aspirin (ECOTRIN LOW STRENGTH) EC tablet 81 mg  81 mg Oral Daily    atorvastatin (LIPITOR) tablet 40 mg  40 mg Oral Daily With Dinner    metoprolol succinate (TOPROL-XL) 24 hr tablet 150 mg  150 mg Oral Daily    pantoprazole (PROTONIX) EC tablet 40 mg  40 mg Oral Early Morning       Prior to Admission medications    Medication Sig Start Date End Date Taking?  Authorizing Provider   amLODIPine (NORVASC) 5 mg tablet Take 1 tablet (5 mg total) by mouth daily 9/16/20  Yes Dillon Pitts MD   aspirin (ECOTRIN LOW STRENGTH) 81 mg EC tablet Take 1 tablet (81 mg total) by mouth daily 3/27/20  Yes Arlyn Dee MD   atorvastatin (LIPITOR) 40 mg tablet TAKE 1 TABLET BY MOUTH DAILY WITH DINNER 3/24/21  Yes Dillon Pitts MD   metoprolol succinate (TOPROL-XL) 50 mg 24 hr tablet TAKE 3 TABLETS BY MOUTH EVERY DAY 1/14/21  Yes Dillon Pitts MD   pantoprazole (PROTONIX) 40 mg tablet TAKE 1 TABLET BY MOUTH EVERY DAY 3/31/21  Yes Dillon Pitts MD   apixaban (ELIQUIS) 5 mg Take 1 tablet (5 mg total) by mouth 2 (two) times a day 6/25/20 4/30/21  Dillon Pitts MD   cholecalciferol (VITAMIN D3) 1,000 units tablet Take 1 tablet (1,000 Units total) by mouth daily 3/30/20 4/30/21  Dillon Pitts MD   diazepam (VALIUM) 5 mg tablet Take 2 tablets (10 mg total) by mouth 2 (two) times a day as needed for anxiety  Patient not taking: Reported on 4/30/2021 4/16/21   Boy Pyle DO   docusate sodium (COLACE) 100 mg capsule Take 1 capsule (100 mg total) by mouth daily  Patient not taking: Reported on 4/30/2021 2/25/21   Mally Lau MD         VTE Pharmacologic Prophylaxis: Apixaban (Eliquis)  VTE Mechanical Prophylaxis: sequential compression device    ==  MD Lizzie Barrera [facility], PGY-2

## 2021-05-02 NOTE — SPEECH THERAPY NOTE
Speech-Language Pathology Bedside Swallow Evaluation    Patient Name: Claudia Winters    HMNCM'U Date: 5/2/2021     Problem List  Principal Problem:    Acute CVA (cerebrovascular accident) [facility])  Active Problems:    H/O aortic aneurysm repair    Hypertension    GERD (gastroesophageal reflux disease)    Atrial fibrillation ([facility] )    Hyperlipidemia    Obesity    History of stroke    Vision changes    Antiphospholipid antibody with hypercoagulable state ([place] [address] )    Aortic valve stenosis    CORIE (obstructive sleep apnea)      Past Medical History  Past Medical History:   Diagnosis Date    Aneurysm of thoracic aorta ([facility] )     last assessed 11/20/17    Anxiety     currently on no meds    Cardiac disease     Cholelithiasis     Chronic kidney disease     GERD (gastroesophageal reflux disease)     Headache due to anesthesia     Hyperlipidemia     Hypertension     Irritable bowel syndrome     Kidney stone     Palpitations     PONV (postoperative nausea and vomiting)     Shortness of breath     ? related to acid reflux    Sleep apnea     not sure    Stroke [facility]) 11/2014    TIA (transient ischemic attack)        Past Surgical History  Past Surgical History:   Procedure Laterality Date    AORTA SURGERY      thoracic - aneurysmorrhaphy    APPENDECTOMY      ASCENDING AORTIC ANEURYSM REPAIR      resolved 8/19/15    CHOLECYSTECTOMY      laparoscopic    CYSTOSCOPY      with removal of object- stent removal    KNEE ARTHROSCOPY Right 1996    with medial meniscus repair    LITHOTRIPSY      renal    MD FRAGMENT KIDNEY STONE/ ESWL Left 5/17/2018    Procedure: LITHROTRIPSY EXTRACORPORAL SHOCKWAVE (ESWL); Surgeon: Colin Pate MD;  [facility]: [facility];  Service: Urology    THUMB ARTHROSCOPY Right 1976    ligament repair       Summary  Pt presented with functional appearing oral and pharyngeal stage swallowing skills with materials administered today   Masticastion was mildly slowed but functional, and pt tolerated his regular texture lunch with thin liquids without s/s aspiration  He does have some trouble with GERD which he feels is fairly well managed with medication  No additional ST f/u needed at this time  Please re consult with any new changes or concerns  Risk/s for Aspiration: low     Recommended Diet: regular diet and thin liquids   Recommended Form of Meds: whole with liquid   Aspiration precautions and swallowing strategies: upright posture      Current Medical Status per H&P 5/1/21   Mirella Busby is a 64 y o  male who was referred to the ED to be evaluated further after MRI of the brain done today showed acute and subacute areas of CVA  The patient had an MRI done today because for the past month he has been having visual changes  It was ordered by his ophthalmologist that he went to go see to evaluate his changes in vision  The ophthalmologist determined that there is no primary ocular pathology that could explain his visual changes and ordered the MRI as part of a workup  The patient reported his vision being fuzzy in both eyes and he described it as "if seeing the static when turning off the television after watching it for an extensive period of time " He also stated that his bilateral lower extremities have gotten a little weaker for the past month  He stated that he has been compliant with his aspirin and Eliquis  He mentioned that he he use to be on Xarelto in the past but was taken off it for failed anticoagulation  He denied any slurred speech/facial droop/focal deficits/falls/headache  Special Studies:  MRI brain 5/1/21 IMPRESSION:  Acute and subacute infarcts are seen in the supra and infratentorial brain suggesting embolic phenomenon  There is advanced chronic microangiopathic changes as well as chronic infarcts in the left corona radiata  This patient was transported to the ER for evaluation      Social/Education/Vocational Hx:  Pt lives with family    Swallow Information   Current Risks for Dysphagia & Aspiration: CVA  Current Diet: regular diet and thin liquids   Baseline Diet: regular diet and thin liquids      Baseline Assessment   Behavior/Cognition: alert  Speech/Language Status: able to participate in conversation and able to follow commands  Patient Positioning: upright in chair  Pain Status/Interventions/Response to Interventions: No report of or nonverbal indications of pain  Swallow Mechanism Exam  Facial: symmetrical  Labial: WFL  Lingual: right sided tongue deviation very slight  Velum: symmetrical  Mandible: adequate ROM  Dentition: adequate  Vocal quality:clear/adequate   Volitional Cough: strong/productive   Respiratory Status: on RA        Consistencies Assessed and Performance   Consistencies Administered: thin liquids and regular solids  Materials administered included water, soda, turkey sandwich    Oral Stage: WFL  Mastication was mildly prolonged but adequate with the materials administered today  Bolus formation and transfer were functional with no significant oral residue noted  No overt s/s reduced oral control  Pharyngeal Stage: WFL  Swallow Mechanics: Swallowing initiation appeared prompt  Laryngeal rise was palpated and judged to be within functional limits  No coughing, throat clearing, change in vocal quality or respiratory status noted today  Esophageal Concerns: hx of GERD    Summary and Recommendations (see above)    Results Reviewed with: patient     Treatment Recommended: No additional ST f/u needed at this time  Please re consult with any new changes or concerns

## 2021-05-02 NOTE — PHYSICAL THERAPY NOTE
Physical Therapy Evaluation     Patient's Name: Mirella Busby    Admitting Diagnosis  Paroxysmal atrial fibrillation ([place] [address] ) [I48 0]  Weakness [R53 1]  Visual field defect [H53 40]  CVA (cerebral vascular accident) ([place] [address] ) [I63 9]  Stroke ([place] [address] ) [I63 9]  Antiphospholipid antibody with hypercoagulable state ([place] [address] ) [D68 61]    Problem List  Patient Active Problem List   Diagnosis    Numbness    H/O aortic aneurysm repair    Hypertension    GERD (gastroesophageal reflux disease)    Anxiety disorder    Atrial fibrillation (HCC)    BRBPR (bright red blood per rectum)    Constipation    Irritable bowel syndrome    Hyperlipidemia    Panic attack    Peyronie disease    Vitamin D deficiency    Obesity    Other viral warts    Physical deconditioning    Acute CVA (cerebrovascular accident) ([place] [address] )    History of stroke    Antiphospholipid antibody with hypercoagulable state ([place] [address] )    Hyperbilirubinemia    CORIE (obstructive sleep apnea)       Past Medical History  Past Medical History:   Diagnosis Date    Aneurysm of thoracic aorta ([place] [address] )     last assessed 11/20/17    Anxiety     currently on no meds    Cardiac disease     Cholelithiasis     Chronic kidney disease     GERD (gastroesophageal reflux disease)     Headache due to anesthesia     Hyperlipidemia     Hypertension     Irritable bowel syndrome     Kidney stone     Palpitations     PONV (postoperative nausea and vomiting)     Shortness of breath     ? related to acid reflux    Sleep apnea     not sure    Stroke [facility]) 11/2014    TIA (transient ischemic attack)        Past Surgical History  Past Surgical History:   Procedure Laterality Date    AORTA SURGERY      thoracic - aneurysmorrhaphy    APPENDECTOMY      ASCENDING AORTIC ANEURYSM REPAIR      resolved 8/19/15    CHOLECYSTECTOMY      laparoscopic    CYSTOSCOPY      with removal of object- stent removal    KNEE ARTHROSCOPY Right 1996    with medial meniscus repair    LITHOTRIPSY renal    [facility] FRAGMENT KIDNEY STONE/ ESWL Left 5/17/2018    Procedure: LITHROTRIPSY EXTRACORPORAL SHOCKWAVE (ESWL); Surgeon: Yair Moy MD;  [facility]  MAIN [facility];  Service: Urology    THUMB ARTHROSCOPY Right 1976    ligament repair        05/02/21 1056   PT Last Visit   PT Visit Date 05/02/21   Note Type   Note type Evaluation   Pain Assessment   Pain Assessment Tool Pain Assessment not indicated - pt denies pain   Pain Score No Pain   Home Living   Type of [address] Multi-level;Stairs to enter with rails;Bed/bath upstairs   Bathroom Shower/Tub Tub/shower unit   Bathroom Toilet Standard   Bathroom Equipment   (No DME stated)   [address]   (no DME stated)   Additional Comments Pt lives with sister in [address] [place], [place] [address]e [facility] with [address] and B/B on second floor   Prior Function   Level of [place] Independent with ADLs and functional mobility   Lives With Family  (sister)   Receives Help From Family   ADL Assistance Independent   IADLs Independent   Falls in the last 6 months 0   Comments Sister works and is often not home  Pt reports no friends or family in the area to help  Restrictions/Precautions   Weight Bearing Precautions Per Order No   Other Precautions Telemetry   General   Family/Caregiver Present No   Cognition   Overall Cognitive Status WFL   Arousal/Participation Alert   Attention Within functional limits   Orientation Level Oriented X4   Memory Within functional limits   Following Commands Follows all commands and directions without difficulty   Comments Pt cooperative and pleasant t/o session  Pt stating his L UE has a resting tremor from 1st stroke    2nd stroke he was "paralyized on the entire R side of my body"   RLE Assessment   RLE Assessment WFL   LLE Assessment   LLE Assessment WFL   Coordination   Movements are Fluid and Coordinated 1   Sensation WFL   Bed Mobility   Supine to Sit 6  Modified independent   Additional Comments Pt supine in bed upon arrival  Pt performed functional mobility, ambulation and stair negotiation prior to returning to room seated OOB in chair with all needs within reach    Transfers   Sit to Stand 6  Modified independent   Stand to Sit 6  Modified independent   Additional Comments Transfers without AD   Ambulation/Elevation   Gait pattern Wide TEE; Excessively slow  ("this is my max speed")   Gait Assistance 5  Supervision   Additional items Verbal cues   Assistive Device None   Distance 250ft   Stair Management Assistance 5  Supervision   Additional items Verbal cues   Stair Management Technique One rail R;Sideways; Foreward   Number of Stairs 14   Balance   Static Sitting Good   Dynamic Sitting Fair +   Static Standing Fair +   Dynamic Standing Fair   Ambulatory Fair   Endurance Deficit   Endurance Deficit No  (Pt showed interest in further ambulation )   Activity Tolerance   Activity Tolerance Patient tolerated treatment well   Medical Staff Made Aware Pt Ryne Ibarra, OT Will Aida    Nurse Made Aware yes   Assessment   Prognosis Good   Problem List Decreased strength; Impaired balance   Assessment Pt is 64 y o  male seen for PT evaluation s/p admit to [facility] on 5/1/2021 w/ Acute CVA (cerebrovascular accident) ([facility] )  PT consulted to assess pt's functional mobility and d/c needs  Order placed for PT eval and tx, w/ up w/ A order  Comorbidities affecting pt's physical performance at time of assessment include: HTN, Obesity, A-fib, CORIE, Hx of stroke and GERD  PTA, pt was independent w/ all functional mobility w/ no AD and lives w/ sister in 3 level home  Personal factors affecting pt at time of IE include: lives in 3 story house, stairs to enter home, limited home support and visual impairments  Please find objective findings from PT assessment regarding body systems outlined above with impairments and limitations including weakness, impaired balance and gait deviations   The following objective measures performed on IE also reveal limitations: Barthel Index: 100/100, Modified Hewitt: 1 (no significant disability) and AM-PAC 6-Clicks: 50/53  Pt performed supine to sit and STS at Mod I   Pt ambulated 250ft with no AD and S level  Stair negotiation was performed at S level with 1 HR and reciprocal pattern  Pt's clinical presentation is currently stable  seen in pt's presentation of displaying adequate functional mobility for return to previous environment  Pt presents as functioning at baseline for all mobility  PT services are not recommended at this time  Pt has no further questions or concerns regarding PT services  Please re-consult PT if medical status changes  From PT/mobility standpoint, recommendation at time of d/c would be no rehabilitation needs pending progress in order to facilitate return to PLOF  Barriers to Discharge Decreased caregiver support   Goals   Patient Goals to go home   PT Treatment Day 0   Plan   PT Frequency   (D/C PT)   Recommendation   PT Discharge Recommendation No rehabilitation needs   PT - OK to Discharge Yes   Additional Comments when medically cleared for home   AM-PAC Basic Mobility Inpatient   Turning in Bed Without Bedrails 4   Lying on Back to Sitting on Edge of Flat Bed 4   Moving Bed to Chair 4   Standing Up From Chair 4   Walk in Room 4   Climb 3-5 Stairs 4   Basic Mobility Inpatient Raw Score 24   Basic Mobility Standardized Score 57 68   Modified Lexi Scale   Modified Lexi Scale 1   Barthel Index   Feeding 10   Bathing 5   Grooming Score 5   Dressing Score 10   Bladder Score 10   Bowels Score 10   Toilet Use Score 10   Transfers (Bed/Chair) Score 15   Mobility (Level Surface) Score 15   Stairs Score 10   Barthel Index Score 100   The patient's AM-[facility] Basic Mobility Inpatient Short Form Raw Score is 24, Standardized Score is 57 68  A standardized score greater than 42 9 suggests the patient may benefit from discharge to home   Please also refer to the recommendation of the Physical Therapist for safe discharge planning        Darell Sevilla, SPT

## 2021-05-02 NOTE — PLAN OF CARE
Problem: Neurological Deficit  Goal: Neurological status is stable or improving  Description: Interventions:  - Monitor and assess patient's level of consciousness, motor function, sensory function, and level of assistance needed for ADLs  - Monitor and report changes from baseline  Collaborate with interdisciplinary team to initiate plan and implement interventions as ordered  - Provide and maintain a safe environment  - Consider seizure precautions  - Consider fall precautions  - Consider aspiration precautions  - Consider bleeding precautions  Outcome: Progressing     Problem: Activity Intolerance/Impaired Mobility  Goal: Mobility/activity is maintained at optimum level for patient  Description: Interventions:  - Assess and monitor patient  barriers to mobility and need for assistive/adaptive devices  - Assess patient's emotional response to limitations  - Collaborate with interdisciplinary team and initiate plans and interventions as ordered  - Encourage independent activity per ability   - Maintain proper body alignment  - Perform active/passive rom as tolerated/ordered  - Plan activities to conserve energy   - Turn patient as appropriate  Outcome: Progressing     Problem: Nutrition  Goal: Nutrition/Hydration status is improving  Description: Monitor and assess patient's nutrition/hydration status for malnutrition (ex- brittle hair, bruises, dry skin, pale skin and conjunctiva, muscle wasting, smooth red tongue, and disorientation)  Collaborate with interdisciplinary team and initiate plan and interventions as ordered  Monitor patient's weight and dietary intake as ordered or per policy  Utilize nutrition screening tool and intervene per policy  Determine patient's food preferences and provide high-protein, high-caloric foods as appropriate  - Assist patient with eating   - Allow adequate time for meals   - Encourage patient to take dietary supplement as ordered    - Collaborate with clinical nutritionist   - Include patient/family/caregiver in decisions related to nutrition  Outcome: Progressing     Problem: PAIN - ADULT  Goal: Verbalizes/displays adequate comfort level or baseline comfort level  Description: Interventions:  - Encourage patient to monitor pain and request assistance  - Assess pain using appropriate pain scale  - Administer analgesics based on type and severity of pain and evaluate response  - Implement non-pharmacological measures as appropriate and evaluate response  - Consider cultural and social influences on pain and pain management  - Notify physician/advanced practitioner if interventions unsuccessful or patient reports new pain  Outcome: Progressing     Problem: INFECTION - ADULT  Goal: Absence or prevention of progression during hospitalization  Description: INTERVENTIONS:  - Assess and monitor for signs and symptoms of infection  - Monitor lab/diagnostic results  - Monitor all insertion sites, i e  indwelling lines, tubes, and drains  - Monitor endotracheal if appropriate and nasal secretions for changes in amount and color  - Marshall appropriate cooling/warming therapies per order  - Administer medications as ordered  - Instruct and encourage patient and family to use good hand hygiene technique  - Identify and instruct in appropriate isolation precautions for identified infection/condition  Outcome: Progressing  Goal: Absence of fever/infection during neutropenic period  Description: INTERVENTIONS:  - Monitor WBC    Outcome: Progressing     Problem: SAFETY ADULT  Goal: Patient will remain free of falls  Description: INTERVENTIONS:  - Assess patient frequently for physical needs  -  Identify cognitive and physical deficits and behaviors that affect risk of falls    -  Marshall fall precautions as indicated by assessment   - Educate patient/family on patient safety including physical limitations  - Instruct patient to call for assistance with activity based on assessment  - Modify environment to reduce risk of injury  - Consider OT/PT consult to assist with strengthening/mobility  Outcome: Progressing  Goal: Maintain or return to baseline ADL function  Description: INTERVENTIONS:  -  Assess patient's ability to carry out ADLs; assess patient's baseline for ADL function and identify physical deficits which impact ability to perform ADLs (bathing, care of mouth/teeth, toileting, grooming, dressing, etc )  - Assess/evaluate cause of self-care deficits   - Assess range of motion  - Assess patient's mobility; develop plan if impaired  - Assess patient's need for assistive devices and provide as appropriate  - Encourage maximum independence but intervene and supervise when necessary  - Involve family in performance of ADLs  - Assess for home care needs following discharge   - Consider OT consult to assist with ADL evaluation and planning for discharge  - Provide patient education as appropriate  Outcome: Progressing  Goal: Maintain or return mobility status to optimal level  Description: INTERVENTIONS:  - Assess patient's baseline mobility status (ambulation, transfers, stairs, etc )    - Identify cognitive and physical deficits and behaviors that affect mobility  - Identify mobility aids required to assist with transfers and/or ambulation (gait belt, sit-to-stand, lift, walker, cane, etc )  - Poth fall precautions as indicated by assessment  - Record patient progress and toleration of activity level on Mobility SBAR; progress patient to next Phase/Stage  - Instruct patient to call for assistance with activity based on assessment  - Consider rehabilitation consult to assist with strengthening/weightbearing, etc   Outcome: Progressing     Problem: DISCHARGE PLANNING  Goal: Discharge to home or other facility with appropriate resources  Description: INTERVENTIONS:  - Identify barriers to discharge w/patient and caregiver  - Arrange for needed discharge resources and transportation as appropriate  - Identify discharge learning needs (meds, wound care, etc )  - Arrange for interpretive services to assist at discharge as needed  - Refer to Case Management Department for coordinating discharge planning if the patient needs post-hospital services based on physician/advanced practitioner order or complex needs related to functional status, cognitive ability, or social support system  Outcome: Progressing     Problem: Knowledge Deficit  Goal: Patient/family/caregiver demonstrates understanding of disease process, treatment plan, medications, and discharge instructions  Description: Complete learning assessment and assess knowledge base    Interventions:  - Provide teaching at level of understanding  - Provide teaching via preferred learning methods  Outcome: Progressing     Problem: NEUROSENSORY - ADULT  Goal: Achieves stable or improved neurological status  Description: INTERVENTIONS  - Monitor and report changes in neurological status  - Monitor vital signs such as temperature, blood pressure, glucose, and any other labs ordered   - Initiate measures to prevent increased intracranial pressure  - Monitor for seizure activity and implement precautions if appropriate      Outcome: Progressing  Goal: Remains free of injury related to seizures activity  Description: INTERVENTIONS  - Maintain airway, patient safety  and administer oxygen as ordered  - Monitor patient for seizure activity, document and report duration and description of seizure to physician/advanced practitioner  - If seizure occurs,  ensure patient safety during seizure  - Reorient patient post seizure  - Seizure pads on all 4 side rails  - Instruct patient/family to notify RN of any seizure activity including if an aura is experienced  - Instruct patient/family to call for assistance with activity based on nursing assessment  - Administer anti-seizure medications if ordered    Outcome: Progressing  Goal: Achieves maximal functionality and self care  Description: INTERVENTIONS  - Monitor swallowing and airway patency with patient fatigue and changes in neurological status  - Encourage and assist patient to increase activity and self care     - Encourage visually impaired, hearing impaired and aphasic patients to use assistive/communication devices  Outcome: Progressing     Problem: CARDIOVASCULAR - ADULT  Goal: Maintains optimal cardiac output and hemodynamic stability  Description: INTERVENTIONS:  - Monitor I/O, vital signs and rhythm  - Monitor for S/S and trends of decreased cardiac output  - Administer and titrate ordered vasoactive medications to optimize hemodynamic stability  - Assess quality of pulses, skin color and temperature  - Assess for signs of decreased coronary artery perfusion  - Instruct patient to report change in severity of symptoms  Outcome: Progressing  Goal: Absence of cardiac dysrhythmias or at baseline rhythm  Description: INTERVENTIONS:  - Continuous cardiac monitoring, vital signs, obtain 12 lead EKG if ordered  - Administer antiarrhythmic and heart rate control medications as ordered  - Monitor electrolytes and administer replacement therapy as ordered  Outcome: Progressing     Problem: RESPIRATORY - ADULT  Goal: Achieves optimal ventilation and oxygenation  Description: INTERVENTIONS:  - Assess for changes in respiratory status  - Assess for changes in mentation and behavior  - Position to facilitate oxygenation and minimize respiratory effort  - Oxygen administered by appropriate delivery if ordered  - Initiate smoking cessation education as indicated  - Encourage broncho-pulmonary hygiene including cough, deep breathe, Incentive Spirometry  - Assess the need for suctioning and aspirate as needed  - Assess and instruct to report SOB or any respiratory difficulty  - Respiratory Therapy support as indicated  Outcome: Progressing     Problem: GASTROINTESTINAL - ADULT  Goal: Minimal or absence of nausea and/or vomiting  Description: INTERVENTIONS:  - Administer IV fluids if ordered to ensure adequate hydration  - Maintain NPO status until nausea and vomiting are resolved  - Nasogastric tube if ordered  - Administer ordered antiemetic medications as needed  - Provide nonpharmacologic comfort measures as appropriate  - Advance diet as tolerated, if ordered  - Consider nutrition services referral to assist patient with adequate nutrition and appropriate food choices  Outcome: Progressing  Goal: Maintains or returns to baseline bowel function  Description: INTERVENTIONS:  - Assess bowel function  - Encourage oral fluids to ensure adequate hydration  - Administer IV fluids if ordered to ensure adequate hydration  - Administer ordered medications as needed  - Encourage mobilization and activity  - Consider nutritional services referral to assist patient with adequate nutrition and appropriate food choices  Outcome: Progressing  Goal: Maintains adequate nutritional intake  Description: INTERVENTIONS:  - Monitor percentage of each meal consumed  - Identify factors contributing to decreased intake, treat as appropriate  - Assist with meals as needed  - Monitor I&O, weight, and lab values if indicated  - Obtain nutrition services referral as needed  Outcome: Progressing  Goal: Establish and maintain optimal ostomy function  Description: INTERVENTIONS:  - Assess bowel function  - Encourage oral fluids to ensure adequate hydration  - Administer IV fluids if ordered to ensure adequate hydration   - Administer ordered medications as needed  - Encourage mobilization and activity  - Nutrition services referral to assist patient with appropriate food choices  - Assess stoma site  - Consider wound care consult   Outcome: Progressing     Problem: GENITOURINARY - ADULT  Goal: Maintains or returns to baseline urinary function  Description: INTERVENTIONS:  - Assess urinary function  - Encourage oral fluids to ensure adequate hydration if ordered  - Administer IV fluids as ordered to ensure adequate hydration  - Administer ordered medications as needed  - Offer frequent toileting  - Follow urinary retention protocol if ordered  Outcome: Progressing  Goal: Absence of urinary retention  Description: INTERVENTIONS:  - Assess patients ability to void and empty bladder  - Monitor I/O  - Bladder scan as needed  - Discuss with physician/AP medications to alleviate retention as needed  - Discuss catheterization for long term situations as appropriate  Outcome: Progressing     Problem: METABOLIC, FLUID AND ELECTROLYTES - ADULT  Goal: Electrolytes maintained within normal limits  Description: INTERVENTIONS:  - Monitor labs and assess patient for signs and symptoms of electrolyte imbalances  - Administer electrolyte replacement as ordered  - Monitor response to electrolyte replacements, including repeat lab results as appropriate  - Instruct patient on fluid and nutrition as appropriate  Outcome: Progressing  Goal: Fluid balance maintained  Description: INTERVENTIONS:  - Monitor labs   - Monitor I/O and WT  - Instruct patient on fluid and nutrition as appropriate  - Assess for signs & symptoms of volume excess or deficit  Outcome: Progressing  Goal: Glucose maintained within target range  Description: INTERVENTIONS:  - Monitor Blood Glucose as ordered  - Assess for signs and symptoms of hyperglycemia and hypoglycemia  - Administer ordered medications to maintain glucose within target range  - Assess nutritional intake and initiate nutrition service referral as needed  Outcome: Progressing     Problem: SKIN/TISSUE INTEGRITY - ADULT  Goal: Skin integrity remains intact  Description: INTERVENTIONS  - Identify patients at risk for skin breakdown  - Assess and monitor skin integrity  - Assess and monitor nutrition and hydration status  - Monitor labs (i e  albumin)  - Assess for incontinence   - Turn and reposition patient  - Assist with mobility/ambulation  - Relieve pressure over bony prominences  - Avoid friction and shearing  - Provide appropriate hygiene as needed including keeping skin clean and dry  - Evaluate need for skin moisturizer/barrier cream  - Collaborate with interdisciplinary team (i e  Nutrition, Rehabilitation, etc )   - Patient/family teaching  Outcome: Progressing  Goal: Incision(s), wounds(s) or drain site(s) healing without S/S of infection  Description: INTERVENTIONS  - Assess and document risk factors for skin impairment   - Assess and document dressing, incision, wound bed, drain sites and surrounding tissue  - Consider nutrition services referral as needed  - Oral mucous membranes remain intact  - Provide patient/ family education  Outcome: Progressing  Goal: Oral mucous membranes remain intact  Description: INTERVENTIONS  - Assess oral mucosa and hygiene practices  - Implement preventative oral hygiene regimen  - Implement oral medicated treatments as ordered  - Initiate Nutrition services referral as needed  Outcome: Progressing     Problem: HEMATOLOGIC - ADULT  Goal: Maintains hematologic stability  Description: INTERVENTIONS  - Assess for signs and symptoms of bleeding or hemorrhage  - Monitor labs  - Administer supportive blood products/factors as ordered and appropriate  Outcome: Progressing     Problem: MUSCULOSKELETAL - ADULT  Goal: Maintain or return mobility to safest level of function  Description: INTERVENTIONS:  - Assess patient's ability to carry out ADLs; assess patient's baseline for ADL function and identify physical deficits which impact ability to perform ADLs (bathing, care of mouth/teeth, toileting, grooming, dressing, etc )  - Assess/evaluate cause of self-care deficits   - Assess range of motion  - Assess patient's mobility  - Assess patient's need for assistive devices and provide as appropriate  - Encourage maximum independence but intervene and supervise when necessary  - Involve family in performance of ADLs  - Assess for home care needs following discharge   - Consider OT consult to assist with ADL evaluation and planning for discharge  - Provide patient education as appropriate  Outcome: Progressing  Goal: Maintain proper alignment of affected body part  Description: INTERVENTIONS:  - Support, maintain and protect limb and body alignment  - Provide patient/ family with appropriate education  Outcome: Progressing     Problem: GENITOURINARY - ADULT  Goal: Urinary catheter remains patent  Description: INTERVENTIONS:  - Assess patency of urinary catheter  - If patient has a chronic mcgregor, consider changing catheter if non-functioning  - Follow guidelines for intermittent irrigation of non-functioning urinary catheter  Outcome: Completed

## 2021-05-02 NOTE — PLAN OF CARE
Problem: Neurological Deficit  Goal: Neurological status is stable or improving  Description: Interventions:  - Monitor and assess patient's level of consciousness, motor function, sensory function, and level of assistance needed for ADLs  - Monitor and report changes from baseline  Collaborate with interdisciplinary team to initiate plan and implement interventions as ordered  - Provide and maintain a safe environment  - Consider seizure precautions  - Consider fall precautions  - Consider aspiration precautions  - Consider bleeding precautions  Outcome: Progressing     Problem: Activity Intolerance/Impaired Mobility  Goal: Mobility/activity is maintained at optimum level for patient  Description: Interventions:  - Assess and monitor patient  barriers to mobility and need for assistive/adaptive devices  - Assess patient's emotional response to limitations  - Collaborate with interdisciplinary team and initiate plans and interventions as ordered  - Encourage independent activity per ability   - Maintain proper body alignment  - Perform active/passive rom as tolerated/ordered  - Plan activities to conserve energy   - Turn patient as appropriate  Outcome: Progressing     Problem: Nutrition  Goal: Nutrition/Hydration status is improving  Description: Monitor and assess patient's nutrition/hydration status for malnutrition (ex- brittle hair, bruises, dry skin, pale skin and conjunctiva, muscle wasting, smooth red tongue, and disorientation)  Collaborate with interdisciplinary team and initiate plan and interventions as ordered  Monitor patient's weight and dietary intake as ordered or per policy  Utilize nutrition screening tool and intervene per policy  Determine patient's food preferences and provide high-protein, high-caloric foods as appropriate  - Assist patient with eating   - Allow adequate time for meals   - Encourage patient to take dietary supplement as ordered    - Collaborate with clinical nutritionist   - Include patient/family/caregiver in decisions related to nutrition  Outcome: Progressing     Problem: PAIN - ADULT  Goal: Verbalizes/displays adequate comfort level or baseline comfort level  Description: Interventions:  - Encourage patient to monitor pain and request assistance  - Assess pain using appropriate pain scale  - Administer analgesics based on type and severity of pain and evaluate response  - Implement non-pharmacological measures as appropriate and evaluate response  - Consider cultural and social influences on pain and pain management  - Notify physician/advanced practitioner if interventions unsuccessful or patient reports new pain  Outcome: Progressing     Problem: INFECTION - ADULT  Goal: Absence or prevention of progression during hospitalization  Description: INTERVENTIONS:  - Assess and monitor for signs and symptoms of infection  - Monitor lab/diagnostic results  - Monitor all insertion sites, i e  indwelling lines, tubes, and drains  - Monitor endotracheal if appropriate and nasal secretions for changes in amount and color  - [place] appropriate cooling/warming therapies per order  - Administer medications as ordered  - Instruct and encourage patient and family to use good hand hygiene technique  - Identify and instruct in appropriate isolation precautions for identified infection/condition  Outcome: Progressing  Goal: Absence of fever/infection during neutropenic period  Description: INTERVENTIONS:  - Monitor WBC    Outcome: Progressing     Problem: SAFETY ADULT  Goal: Patient will remain free of falls  Description: INTERVENTIONS:  - Assess patient frequently for physical needs  -  Identify cognitive and physical deficits and behaviors that affect risk of falls    -  [place] fall precautions as indicated by assessment   - Educate patient/family on patient safety including physical limitations  - Instruct patient to call for assistance with activity based on assessment  - Modify environment to reduce risk of injury  - Consider OT/PT consult to assist with strengthening/mobility  Outcome: Progressing  Goal: Maintain or return to baseline ADL function  Description: INTERVENTIONS:  -  Assess patient's ability to carry out ADLs; assess patient's baseline for ADL function and identify physical deficits which impact ability to perform ADLs (bathing, care of mouth/teeth, toileting, grooming, dressing, etc )  - Assess/evaluate cause of self-care deficits   - Assess range of motion  - Assess patient's mobility; develop plan if impaired  - Assess patient's need for assistive devices and provide as appropriate  - Encourage maximum independence but intervene and supervise when necessary  - Involve family in performance of ADLs  - Assess for home care needs following discharge   - Consider OT consult to assist with ADL evaluation and planning for discharge  - Provide patient education as appropriate  Outcome: Progressing  Goal: Maintain or return mobility status to optimal level  Description: INTERVENTIONS:  - Assess patient's baseline mobility status (ambulation, transfers, stairs, etc )    - Identify cognitive and physical deficits and behaviors that affect mobility  - Identify mobility aids required to assist with transfers and/or ambulation (gait belt, sit-to-stand, lift, walker, cane, etc )  - [place] fall precautions as indicated by assessment  - Record patient progress and toleration of activity level on Mobility SBAR; progress patient to next Phase/Stage  - Instruct patient to call for assistance with activity based on assessment  - Consider rehabilitation consult to assist with strengthening/weightbearing, etc   Outcome: Progressing     Problem: DISCHARGE PLANNING  Goal: Discharge to home or other facility with appropriate resources  Description: INTERVENTIONS:  - Identify barriers to discharge w/patient and caregiver  - Arrange for needed discharge resources and transportation as appropriate  - Identify discharge learning needs (meds, wound care, etc )  - Arrange for interpretive services to assist at discharge as needed  - Refer to Case Management Department for coordinating discharge planning if the patient needs post-hospital services based on physician/advanced practitioner order or complex needs related to functional status, cognitive ability, or social support system  Outcome: Progressing     Problem: Knowledge Deficit  Goal: Patient/family/caregiver demonstrates understanding of disease process, treatment plan, medications, and discharge instructions  Description: Complete learning assessment and assess knowledge base    Interventions:  - Provide teaching at level of understanding  - Provide teaching via preferred learning methods  Outcome: Progressing     Problem: NEUROSENSORY - ADULT  Goal: Achieves stable or improved neurological status  Description: INTERVENTIONS  - Monitor and report changes in neurological status  - Monitor vital signs such as temperature, blood pressure, glucose, and any other labs ordered   - Initiate measures to prevent increased intracranial pressure  - Monitor for seizure activity and implement precautions if appropriate      Outcome: Progressing  Goal: Remains free of injury related to seizures activity  Description: INTERVENTIONS  - Maintain airway, patient safety  and administer oxygen as ordered  - Monitor patient for seizure activity, document and report duration and description of seizure to physician/advanced practitioner  - If seizure occurs,  ensure patient safety during seizure  - Reorient patient post seizure  - Seizure pads on all 4 side rails  - Instruct patient/family to notify RN of any seizure activity including if an aura is experienced  - Instruct patient/family to call for assistance with activity based on nursing assessment  - Administer anti-seizure medications if ordered    Outcome: Progressing  Goal: Achieves maximal functionality and self care  Description: INTERVENTIONS  - Monitor swallowing and airway patency with patient fatigue and changes in neurological status  - Encourage and assist patient to increase activity and self care     - Encourage visually impaired, hearing impaired and aphasic patients to use assistive/communication devices  Outcome: Progressing     Problem: CARDIOVASCULAR - ADULT  Goal: Maintains optimal cardiac output and hemodynamic stability  Description: INTERVENTIONS:  - Monitor I/O, vital signs and rhythm  - Monitor for S/S and trends of decreased cardiac output  - Administer and titrate ordered vasoactive medications to optimize hemodynamic stability  - Assess quality of pulses, skin color and temperature  - Assess for signs of decreased coronary artery perfusion  - Instruct patient to report change in severity of symptoms  Outcome: Progressing  Goal: Absence of cardiac dysrhythmias or at baseline rhythm  Description: INTERVENTIONS:  - Continuous cardiac monitoring, vital signs, obtain 12 lead EKG if ordered  - Administer antiarrhythmic and heart rate control medications as ordered  - Monitor electrolytes and administer replacement therapy as ordered  Outcome: Progressing     Problem: RESPIRATORY - ADULT  Goal: Achieves optimal ventilation and oxygenation  Description: INTERVENTIONS:  - Assess for changes in respiratory status  - Assess for changes in mentation and behavior  - Position to facilitate oxygenation and minimize respiratory effort  - Oxygen administered by appropriate delivery if ordered  - Initiate smoking cessation education as indicated  - Encourage broncho-pulmonary hygiene including cough, deep breathe, Incentive Spirometry  - Assess the need for suctioning and aspirate as needed  - Assess and instruct to report SOB or any respiratory difficulty  - Respiratory Therapy support as indicated  Outcome: Progressing     Problem: GASTROINTESTINAL - ADULT  Goal: Minimal or absence of nausea and/or vomiting  Description: INTERVENTIONS:  - Administer IV fluids if ordered to ensure adequate hydration  - Maintain NPO status until nausea and vomiting are resolved  - Nasogastric tube if ordered  - Administer ordered antiemetic medications as needed  - Provide nonpharmacologic comfort measures as appropriate  - Advance diet as tolerated, if ordered  - Consider nutrition services referral to assist patient with adequate nutrition and appropriate food choices  Outcome: Progressing  Goal: Maintains or returns to baseline bowel function  Description: INTERVENTIONS:  - Assess bowel function  - Encourage oral fluids to ensure adequate hydration  - Administer IV fluids if ordered to ensure adequate hydration  - Administer ordered medications as needed  - Encourage mobilization and activity  - Consider nutritional services referral to assist patient with adequate nutrition and appropriate food choices  Outcome: Progressing  Goal: Maintains adequate nutritional intake  Description: INTERVENTIONS:  - Monitor percentage of each meal consumed  - Identify factors contributing to decreased intake, treat as appropriate  - Assist with meals as needed  - Monitor I&O, weight, and lab values if indicated  - Obtain nutrition services referral as needed  Outcome: Progressing  Goal: Establish and maintain optimal ostomy function  Description: INTERVENTIONS:  - Assess bowel function  - Encourage oral fluids to ensure adequate hydration  - Administer IV fluids if ordered to ensure adequate hydration   - Administer ordered medications as needed  - Encourage mobilization and activity  - Nutrition services referral to assist patient with appropriate food choices  - Assess stoma site  - Consider wound care consult   Outcome: Progressing     Problem: GENITOURINARY - ADULT  Goal: Maintains or returns to baseline urinary function  Description: INTERVENTIONS:  - Assess urinary function  - Encourage oral fluids to ensure adequate hydration if ordered  - Administer IV fluids as ordered to ensure adequate hydration  - Administer ordered medications as needed  - Offer frequent toileting  - Follow urinary retention protocol if ordered  Outcome: Progressing  Goal: Absence of urinary retention  Description: INTERVENTIONS:  - Assess patients ability to void and empty bladder  - Monitor I/O  - Bladder scan as needed  - Discuss with physician/AP medications to alleviate retention as needed  - Discuss catheterization for long term situations as appropriate  Outcome: Progressing  Goal: Urinary catheter remains patent  Description: INTERVENTIONS:  - Assess patency of urinary catheter  - If patient has a chronic mcgregor, consider changing catheter if non-functioning  - Follow guidelines for intermittent irrigation of non-functioning urinary catheter  Outcome: Progressing     Problem: METABOLIC, FLUID AND ELECTROLYTES - ADULT  Goal: Electrolytes maintained within normal limits  Description: INTERVENTIONS:  - Monitor labs and assess patient for signs and symptoms of electrolyte imbalances  - Administer electrolyte replacement as ordered  - Monitor response to electrolyte replacements, including repeat lab results as appropriate  - Instruct patient on fluid and nutrition as appropriate  Outcome: Progressing  Goal: Fluid balance maintained  Description: INTERVENTIONS:  - Monitor labs   - Monitor I/O and WT  - Instruct patient on fluid and nutrition as appropriate  - Assess for signs & symptoms of volume excess or deficit  Outcome: Progressing  Goal: Glucose maintained within target range  Description: INTERVENTIONS:  - Monitor Blood Glucose as ordered  - Assess for signs and symptoms of hyperglycemia and hypoglycemia  - Administer ordered medications to maintain glucose within target range  - Assess nutritional intake and initiate nutrition service referral as needed  Outcome: Progressing     Problem: SKIN/TISSUE INTEGRITY - ADULT  Goal: Skin integrity remains intact  Description: INTERVENTIONS  - Identify patients at risk for skin breakdown  - Assess and monitor skin integrity  - Assess and monitor nutrition and hydration status  - Monitor labs (i e  albumin)  - Assess for incontinence   - Turn and reposition patient  - Assist with mobility/ambulation  - Relieve pressure over bony prominences  - Avoid friction and shearing  - Provide appropriate hygiene as needed including keeping skin clean and dry  - Evaluate need for skin moisturizer/barrier cream  - Collaborate with interdisciplinary team (i e  Nutrition, Rehabilitation, etc )   - Patient/family teaching  Outcome: Progressing  Goal: Incision(s), wounds(s) or drain site(s) healing without S/S of infection  Description: INTERVENTIONS  - Assess and document risk factors for skin impairment   - Assess and document dressing, incision, wound bed, drain sites and surrounding tissue  - Consider nutrition services referral as needed  - Oral mucous membranes remain intact  - Provide patient/ family education  Outcome: Progressing  Goal: Oral mucous membranes remain intact  Description: INTERVENTIONS  - Assess oral mucosa and hygiene practices  - Implement preventative oral hygiene regimen  - Implement oral medicated treatments as ordered  - Initiate Nutrition services referral as needed  Outcome: Progressing     Problem: HEMATOLOGIC - ADULT  Goal: Maintains hematologic stability  Description: INTERVENTIONS  - Assess for signs and symptoms of bleeding or hemorrhage  - Monitor labs  - Administer supportive blood products/factors as ordered and appropriate  Outcome: Progressing     Problem: MUSCULOSKELETAL - ADULT  Goal: Maintain or return mobility to safest level of function  Description: INTERVENTIONS:  - Assess patient's ability to carry out ADLs; assess patient's baseline for ADL function and identify physical deficits which impact ability to perform ADLs (bathing, care of mouth/teeth, toileting, grooming, dressing, etc )  - Assess/evaluate cause of self-care deficits   - Assess range of motion  - Assess patient's mobility  - Assess patient's need for assistive devices and provide as appropriate  - Encourage maximum independence but intervene and supervise when necessary  - Involve family in performance of ADLs  - Assess for home care needs following discharge   - Consider OT consult to assist with ADL evaluation and planning for discharge  - Provide patient education as appropriate  Outcome: Progressing  Goal: Maintain proper alignment of affected body part  Description: INTERVENTIONS:  - Support, maintain and protect limb and body alignment  - Provide patient/ family with appropriate education  Outcome: Progressing

## 2021-05-02 NOTE — CASE MANAGEMENT
LOS 1  Unplanned readmission risk score: 9  Not a bundle    Met with pt to discuss the role of CM and to discuss any help pt may need prior to dc  Pt lives with his sister in 3 story home with 6 SHELDON; bed/bathroom on the 2nd floor  Pt preformed ADL's indptly pta, no use of DME  Pt has a hx of HHC but unsure of the agency  Pt has a hx of rehab on [facility]  Pt drives  Pt has a hx of IP mental health admission in 2014 at [facility]  No hx of D&A treatment  Pt's preferred pharmacy is [facility] on [address]  Contact: Chaim Hernandez (sister) [phone]  No POA or living will  Pt's car is in the parking garage; he will drive himself home at dc  CM reviewed d/c planning process including the following: identifying help at home, patient preference for d/c planning needs, Discharge Lounge, [facility] Meds to Bed program, availability of treatment team to discuss questions or concerns patient and/or family may have regarding understanding medications and recognizing signs and symptoms once discharged  CM also encouraged patient to follow up with all recommended appointments after discharge  Patient advised of importance for patient and family to participate in managing patients medical well being  Patient/caregiver received discharge checklist  Content reviewed  Patient/caregiver encouraged to participate in discharge plan of care prior to discharge home

## 2021-05-02 NOTE — ASSESSMENT & PLAN NOTE
Known hx of strokes in past  On Eliquis after Xarelto failure  Had outpatient MRI Brain showing acute and subacute areas of stroke    · Appreciate neurology input  · CTA head and neck pending  · For NGUYỄN, NPO [place]  · Thrombosis panel pending per neuro though already has likely antiphospholipid syndrome per outpatient hematology   · Recommend blood pressure goals SBP is (140-180)  · Already has loop recorder in place  · continue lipitor, asa and eliquis for now  · PT/OT/ST

## 2021-05-03 ENCOUNTER — ANESTHESIA (INPATIENT)
Dept: NON INVASIVE DIAGNOSTICS | Facility: HOSPITAL | Age: 61
DRG: 065 | End: 2021-05-03
Payer: COMMERCIAL

## 2021-05-03 ENCOUNTER — TELEPHONE (OUTPATIENT)
Dept: NEUROLOGY | Facility: CLINIC | Age: 61
End: 2021-05-03

## 2021-05-03 LAB
ANION GAP SERPL CALCULATED.3IONS-SCNC: 6 MMOL/L (ref 4–13)
ATRIAL RATE: 52 BPM
B2 GLYCOPROT1 IGA SERPL IA-ACNC: 2.6
B2 GLYCOPROT1 IGG SERPL IA-ACNC: <0.6
B2 GLYCOPROT1 IGM SERPL IA-ACNC: <2.9
BASOPHILS # BLD AUTO: 0.04 THOUSANDS/ΜL (ref 0–0.1)
BASOPHILS NFR BLD AUTO: 1 % (ref 0–1)
BUN SERPL-MCNC: 15 MG/DL (ref 5–25)
CALCIUM SERPL-MCNC: 8.9 MG/DL (ref 8.3–10.1)
CARDIOLIPIN IGA SER IA-ACNC: >181
CARDIOLIPIN IGG SER IA-ACNC: 0.7
CARDIOLIPIN IGM SER IA-ACNC: 19
CHLORIDE SERPL-SCNC: 109 MMOL/L (ref 100–108)
CO2 SERPL-SCNC: 26 MMOL/L (ref 21–32)
CREAT SERPL-MCNC: 1.1 MG/DL (ref 0.6–1.3)
EOSINOPHIL # BLD AUTO: 0.35 THOUSAND/ΜL (ref 0–0.61)
EOSINOPHIL NFR BLD AUTO: 4 % (ref 0–6)
ERYTHROCYTE [DISTWIDTH] IN BLOOD BY AUTOMATED COUNT: 13.2 % (ref 11.6–15.1)
GFR SERPL CREATININE-BSD FRML MDRD: 72 ML/MIN/1.73SQ M
GLUCOSE SERPL-MCNC: 109 MG/DL (ref 65–140)
HCT VFR BLD AUTO: 41.9 % (ref 36.5–49.3)
HGB BLD-MCNC: 14.3 G/DL (ref 12–17)
IMM GRANULOCYTES # BLD AUTO: 0.02 THOUSAND/UL (ref 0–0.2)
IMM GRANULOCYTES NFR BLD AUTO: 0 % (ref 0–2)
INR PPP: 1.15 (ref 0.84–1.19)
LYMPHOCYTES # BLD AUTO: 1.58 THOUSANDS/ΜL (ref 0.6–4.47)
LYMPHOCYTES NFR BLD AUTO: 19 % (ref 14–44)
MCH RBC QN AUTO: 30 PG (ref 26.8–34.3)
MCHC RBC AUTO-ENTMCNC: 34.1 G/DL (ref 31.4–37.4)
MCV RBC AUTO: 88 FL (ref 82–98)
MONOCYTES # BLD AUTO: 0.61 THOUSAND/ΜL (ref 0.17–1.22)
MONOCYTES NFR BLD AUTO: 7 % (ref 4–12)
NEUTROPHILS # BLD AUTO: 5.78 THOUSANDS/ΜL (ref 1.85–7.62)
NEUTS SEG NFR BLD AUTO: 69 % (ref 43–75)
NRBC BLD AUTO-RTO: 0 /100 WBCS
P AXIS: 60 DEGREES
PLATELET # BLD AUTO: 180 THOUSANDS/UL (ref 149–390)
PMV BLD AUTO: 9.7 FL (ref 8.9–12.7)
POTASSIUM SERPL-SCNC: 3.7 MMOL/L (ref 3.5–5.3)
PR INTERVAL: 142 MS
PROT C AG ACT/NOR PPP IA: 149 % OF NORMAL (ref 60–150)
PROT S ACT/NOR PPP: 114 % (ref 71–117)
PROTHROMBIN TIME: 14.7 SECONDS (ref 11.6–14.5)
QRS AXIS: 31 DEGREES
QRSD INTERVAL: 82 MS
QT INTERVAL: 594 MS
QTC INTERVAL: 552 MS
RBC # BLD AUTO: 4.77 MILLION/UL (ref 3.88–5.62)
SODIUM SERPL-SCNC: 141 MMOL/L (ref 136–145)
T WAVE AXIS: 41 DEGREES
VENTRICULAR RATE: 52 BPM
WBC # BLD AUTO: 8.38 THOUSAND/UL (ref 4.31–10.16)

## 2021-05-03 PROCEDURE — 85610 PROTHROMBIN TIME: CPT | Performed by: PSYCHIATRY & NEUROLOGY

## 2021-05-03 PROCEDURE — 80048 BASIC METABOLIC PNL TOTAL CA: CPT | Performed by: INTERNAL MEDICINE

## 2021-05-03 PROCEDURE — 99232 SBSQ HOSP IP/OBS MODERATE 35: CPT | Performed by: INTERNAL MEDICINE

## 2021-05-03 PROCEDURE — 93320 DOPPLER ECHO COMPLETE: CPT | Performed by: INTERNAL MEDICINE

## 2021-05-03 PROCEDURE — 85025 COMPLETE CBC W/AUTO DIFF WBC: CPT | Performed by: INTERNAL MEDICINE

## 2021-05-03 PROCEDURE — 93010 ELECTROCARDIOGRAM REPORT: CPT | Performed by: INTERNAL MEDICINE

## 2021-05-03 PROCEDURE — 93325 DOPPLER ECHO COLOR FLOW MAPG: CPT | Performed by: INTERNAL MEDICINE

## 2021-05-03 PROCEDURE — NC001 PR NO CHARGE: Performed by: INTERNAL MEDICINE

## 2021-05-03 PROCEDURE — 93312 ECHO TRANSESOPHAGEAL: CPT

## 2021-05-03 PROCEDURE — 93312 ECHO TRANSESOPHAGEAL: CPT | Performed by: INTERNAL MEDICINE

## 2021-05-03 PROCEDURE — B24BZZ4 ULTRASONOGRAPHY OF HEART WITH AORTA, TRANSESOPHAGEAL: ICD-10-PCS | Performed by: INTERNAL MEDICINE

## 2021-05-03 PROCEDURE — 99222 1ST HOSP IP/OBS MODERATE 55: CPT | Performed by: PHYSICAL MEDICINE & REHABILITATION

## 2021-05-03 PROCEDURE — 99232 SBSQ HOSP IP/OBS MODERATE 35: CPT | Performed by: PSYCHIATRY & NEUROLOGY

## 2021-05-03 RX ORDER — ONDANSETRON 2 MG/ML
INJECTION INTRAMUSCULAR; INTRAVENOUS AS NEEDED
Status: DISCONTINUED | OUTPATIENT
Start: 2021-05-03 | End: 2021-05-03

## 2021-05-03 RX ORDER — PROPOFOL 10 MG/ML
INJECTION, EMULSION INTRAVENOUS AS NEEDED
Status: DISCONTINUED | OUTPATIENT
Start: 2021-05-03 | End: 2021-05-03

## 2021-05-03 RX ORDER — GLYCOPYRROLATE 0.2 MG/ML
INJECTION INTRAMUSCULAR; INTRAVENOUS AS NEEDED
Status: DISCONTINUED | OUTPATIENT
Start: 2021-05-03 | End: 2021-05-03

## 2021-05-03 RX ORDER — SODIUM CHLORIDE 9 MG/ML
INJECTION, SOLUTION INTRAVENOUS CONTINUOUS PRN
Status: DISCONTINUED | OUTPATIENT
Start: 2021-05-03 | End: 2021-05-03

## 2021-05-03 RX ORDER — WARFARIN SODIUM 5 MG/1
10 TABLET ORAL
Status: DISCONTINUED | OUTPATIENT
Start: 2021-05-03 | End: 2021-05-04 | Stop reason: HOSPADM

## 2021-05-03 RX ORDER — KETAMINE HCL IN NACL, ISO-OSM 100MG/10ML
SYRINGE (ML) INJECTION AS NEEDED
Status: DISCONTINUED | OUTPATIENT
Start: 2021-05-03 | End: 2021-05-03

## 2021-05-03 RX ADMIN — PROPOFOL 10 MG: 10 INJECTION, EMULSION INTRAVENOUS at 09:32

## 2021-05-03 RX ADMIN — Medication 15 MG: at 09:07

## 2021-05-03 RX ADMIN — LIDOCAINE HYDROCHLORIDE 100 MG: 20 INJECTION INTRAVENOUS at 09:02

## 2021-05-03 RX ADMIN — ONDANSETRON 4 MG: 2 INJECTION INTRAMUSCULAR; INTRAVENOUS at 09:02

## 2021-05-03 RX ADMIN — METOPROLOL SUCCINATE 150 MG: 100 TABLET, EXTENDED RELEASE ORAL at 11:52

## 2021-05-03 RX ADMIN — PHENYLEPHRINE HYDROCHLORIDE 150 MCG: 10 INJECTION INTRAVENOUS at 09:30

## 2021-05-03 RX ADMIN — ATORVASTATIN CALCIUM 40 MG: 40 TABLET, FILM COATED ORAL at 17:35

## 2021-05-03 RX ADMIN — PROPOFOL 10 MG: 10 INJECTION, EMULSION INTRAVENOUS at 09:17

## 2021-05-03 RX ADMIN — PROPOFOL 100 MG: 10 INJECTION, EMULSION INTRAVENOUS at 09:07

## 2021-05-03 RX ADMIN — PROPOFOL 20 MG: 10 INJECTION, EMULSION INTRAVENOUS at 09:29

## 2021-05-03 RX ADMIN — GLYCOPYRROLATE 0.2 MG: 0.2 INJECTION, SOLUTION INTRAMUSCULAR; INTRAVENOUS at 09:02

## 2021-05-03 RX ADMIN — SODIUM CHLORIDE: 0.9 INJECTION, SOLUTION INTRAVENOUS at 08:49

## 2021-05-03 RX ADMIN — AMLODIPINE BESYLATE 5 MG: 5 TABLET ORAL at 11:52

## 2021-05-03 RX ADMIN — PROPOFOL 20 MG: 10 INJECTION, EMULSION INTRAVENOUS at 09:24

## 2021-05-03 RX ADMIN — ASPIRIN 81 MG: 81 TABLET, COATED ORAL at 11:51

## 2021-05-03 RX ADMIN — PROPOFOL 10 MG: 10 INJECTION, EMULSION INTRAVENOUS at 09:13

## 2021-05-03 RX ADMIN — WARFARIN SODIUM 10 MG: 5 TABLET ORAL at 17:36

## 2021-05-03 RX ADMIN — PROPOFOL 20 MG: 10 INJECTION, EMULSION INTRAVENOUS at 09:20

## 2021-05-03 NOTE — ANESTHESIA POSTPROCEDURE EVALUATION
Post-Op Assessment Note    CV Status:  Stable       Mental Status:  Alert and awake   Hydration Status:  Euvolemic   PONV Controlled:  Controlled   Airway Patency:  Patent      Post Op Vitals Reviewed: Yes      Staff: CRNA         No complications documented      BP   110/58   Temp      Pulse  62   Resp   14   SpO2   99

## 2021-05-03 NOTE — ASSESSMENT & PLAN NOTE
Noted  · Hematology was consulted however I d/w them on 5/2, at this time the recommendations are for lifelong [facility], agents to be chosen by neurology  · Known to them as outpatient  · Thrombosis panel pending (5/3/21)

## 2021-05-03 NOTE — ASSESSMENT & PLAN NOTE
Patient referred to ED on 05/01/21 after outpatient MRI showed acute and subacute infarctions  · MRI was ordered by ophthalmology  To evaluate hx of 1 month of L central vision deficit  · MRI brain findings (05/01/21)   · Acute and subacute infarcts are seen in the supra and infratentorial brain suggesting embolic phenomenon  · Advanced chronic microangiopathic changes as well as chronic infarcts in the left corona radiata  · CTA head and neck (05/03/21)   · CT brain - Subtle areas of decreased attenuation within brain parenchyma peripherally representing combination of subacute ischemia and chronic microangiopathic change  No hemorrahge  · CT angiography - cervical vascular indicates no stenosis, occlusion, thrombosis  · Intracranial vasculature demonstrates mild smooth narrowing of M2 branches b/l suggesting underlying nonspecific vasculopathy  No occlusion or thrombosis  · Hx significant for 3 prior strokes - last one 03/2020  · Prior history of atrial fibrillation - s/p procedure loop recorder  · Prior to admission, patient was on Eliquis after Xarelto failure  · Eliquis has been discontinued and Coumadin started 5/2 per neurology   They report bridging is not needed  · Will inc warfarin to 10 mg tonight   · Monitor INR, goal 2-3  · NGUYỄN (05/03/21) - pending  · Thrombosis panel pending per neuro   · Already has likely antiphospholipid syndrome per outpatient hematology   · Recommend blood pressure goals SBP is (140-180)  · Continue lipitor, asa  · PT/OT/ST  · PMR following -likely ok to discharge to home w/ continued outpatient therapy

## 2021-05-03 NOTE — ANESTHESIA PREPROCEDURE EVALUATION
Procedure:  NGUYỄN    Relevant Problems   CARDIO   (+) Atrial fibrillation (HCC)   (+) Hyperlipidemia   (+) Hypertension      GI/HEPATIC   (+) BRBPR (bright red blood per rectum)   (+) GERD (gastroesophageal reflux disease)      NEURO/PSYCH   (+) Acute CVA (cerebrovascular accident) ([facility] )   (+) Anxiety disorder   (+) History of stroke   (+) Panic attack      PULMONARY   (+) CORIE (obstructive sleep apnea)      Other   (+) Antiphospholipid antibody with hypercoagulable state ([facility] )   (+) H/O aortic aneurysm repair   (+) Irritable bowel syndrome   (+) Obesity   (+) Vitamin D deficiency      Recent labs personally reviewed:  Lab Results   Component Value Date    WBC 8 38 05/03/2021    HGB 14 3 05/03/2021     05/03/2021     Lab Results   Component Value Date     03/29/2017    K 3 7 05/03/2021    BUN 15 05/03/2021    CREATININE 1 10 05/03/2021    GLUCOSE 103 12/08/2014     Lab Results   Component Value Date    PTT 29 05/01/2021      Lab Results   Component Value Date    INR 1 07 05/01/2021       Blood type O    Lab Results   Component Value Date    HGBA1C 5 3 05/02/2021       Physical Exam    Airway       Dental       Cardiovascular  Rhythm: regular, Rate: normal,     Pulmonary  Breath sounds clear to auscultation,     Other Findings        Anesthesia Plan  ASA Score- 3     Anesthesia Type- IV sedation with anesthesia with ASA Monitors  Additional Monitors:   Airway Plan:     Comment: Plan for IV Sedation with GA backup  All risks/benefits and alternatives were discussed with the patient including the possibility of escalation of care in terms of anesthesia to GA and the possibility of rare anesthetic and surgical emergencies  Patient agreed and had no further questions prior to procedure  Lucy Art MD, have personally seen and evaluated the patient prior to anesthetic care  I have reviewed the pre-anesthetic record, and other medical records if appropriate to the anesthetic care    If a CRNA is involved in the case, I have reviewed the CRNA assessment, if present, and agree          Plan Factors-    Chart reviewed  EKG reviewed  Existing labs reviewed  Patient summary reviewed  Induction- intravenous  Postoperative Plan-     Informed Consent- Anesthetic plan and risks discussed with patient  I personally reviewed this patient with the CRNA  Discussed and agreed on the Anesthesia Plan with the CRNA  Josh Sparks

## 2021-05-03 NOTE — ASSESSMENT & PLAN NOTE
Body mass index is 39 53 kg/m²     · Recommend weight loss  · F/up with PCP outpatient for continued management, lifestyle modifications

## 2021-05-03 NOTE — CONSULTS
PHYSICAL MEDICINE AND REHABILITATION CONSULT NOTE  Rachna Fried 64 y o  male MRN: [id number]  Unit/Bed#: [facility] 707-01 Encounter: [id number]    Requested by (Physician/Service): Charles Aparicio MD  Reason for Consultation:  Assessment of rehabilitation needs    Assessment:  Rehabilitation Diagnosis:    Acute and subacute infarcts in the supra and infratentorial brain suggesting embolic phenomenon   Impaired mobility and self care    Recommendations:  Rehabilitation Plan:   Continue PT/OT while on acute care   The patient will likely be able to d/c to home with continues outpt therapy   Covid-19 Testing: [facility] rehabilitation units require testing within 48 hours of potential admission for all general admissions  Please re-test if needed  *Re-testing is NOT required for patients recovering from COVID-19 infection if isolation has been discontinued per CDC criteria  Medical Co-morbidities Plan:  · Acute and subacute infarcts in the supra and infratentorial brain suggesting embolic   · Visual disturbances   · History of CVA  · Afib on Eliquis   · CORIE  · Antiphospholipid syndrome   · HLD  · Hypertension  · DVT ppx:  Eliquis and SCD    Thank you for this consultation  Do not hesitate to contact service with further questions  Orpha Se, CRNP  PM&R    History of Present Illness:  Shoshana Silverio is a 64 y o  male with a PMH of thoracic aortic aneurysm repair in 2014, afib, prior embolic CVA, bicuspid aortic valve, HTN, HLD who presented to the [facility] on 5/1/21 due to further evaluation after MRI was done showing acute and subacute areas of CVA  He reported for the past month he was having fuzzy spots in his vision on both eyes as well as bilateral LE weakness  PM&R are consulted for rehabilitation recommendations  The patient was seen in his room  He is reporting that he continues to have spots in his vision    He denies any weakness, numbness, tingling or pain  Review of Systems: 10 point ROS negative except for what is noted in HPI    Function:  Prior level of function and living situation:  The patient lives with his sister in a 3 story home with 6 SHELDON, bed/bathroom are on the 2nd floor  He was independent for ADLs  Current level of function:  Physical Therapy:  Modified independent for bed mobility and transfers, supervision for ambulation going 250 feet  Occupational Therapy:  Supervision for ADLs    Physical Exam:  /89   Pulse 64   Temp 98 6 °F (37 °C)   Resp 18   Ht 5' 8" (1 727 m)   Wt 118 kg (260 lb)   SpO2 92%   BMI 39 53 kg/m²        Intake/Output Summary (Last 24 hours) at 5/3/2021 1001  Last data filed at 5/3/2021 0935  Gross per 24 hour   Intake 1450 ml   Output 0 ml   Net 1450 ml       Body mass index is 39 53 kg/m²  Physical Exam  HENT:      Head: Normocephalic and atraumatic  Eyes:      Extraocular Movements: Extraocular movements intact  Cardiovascular:      Pulses: Normal pulses  Pulmonary:      Effort: Pulmonary effort is normal    Musculoskeletal: Normal range of motion  Neurological:      Mental Status: He is alert and oriented to person, place, and time     Psychiatric:         Mood and Affect: Mood normal         Social History:    Social History     Socioeconomic History    Marital status:      Spouse name: Not on file    Number of children: Not on file    Years of education: Not on file    Highest education level: Not on file   Occupational History    Occupation: disabled     Social Needs    Financial resource strain: Not on file    Food insecurity     Worry: Not on file     Inability: Not on file   [employer] needs     Medical: Not on file     Non-medical: Not on file   Tobacco Use    Smoking status: Never Smoker    Smokeless tobacco: Never Used    Tobacco comment: no second hand smoke exposure   Substance and Sexual Activity    Alcohol use: Not Currently    Drug use: No    Sexual activity: Not on file   Lifestyle    Physical activity     Days per week: Not on file     Minutes per session: Not on file    Stress: Not on file   Relationships    Social connections     Talks on phone: Not on file     Gets together: Not on file     Attends [employer]ism service: Not on file     Active member of club or organization: Not on file     Attends meetings of clubs or organizations: Not on file     Relationship status: Not on file    Intimate partner violence     Fear of current or ex partner: Not on file     Emotionally abused: Not on file     Physically abused: Not on file     Forced sexual activity: Not on file   Other Topics Concern    Not on file   Social History Narrative    Caffeine use    Completed some college     as per [employer]        Family History:    Family History   Problem Relation Age of Onset    Diverticulitis Mother         of colon    Nephrolithiasis Mother     Emphysema Father     Nephrolithiasis Sister     Heart attack Maternal Grandfather 72    Breast cancer Paternal Grandmother     Lung cancer Paternal Grandfather     Cancer Family     Coronary artery disease Family     Diabetes Family         sibling    Hypertension Family         sibling    Hernia Family         sibling         Medications:     Current Facility-Administered Medications:     amLODIPine (NORVASC) tablet 5 mg, 5 mg, Oral, Daily, Mukesh Paige MD, 5 mg at 05/02/21 0851    aspirin (ECOTRIN LOW STRENGTH) EC tablet 81 mg, 81 mg, Oral, Daily, Mukesh Paige MD, 81 mg at 05/02/21 0850    atorvastatin (LIPITOR) tablet 40 mg, 40 mg, Oral, Daily With Jesse Elise MD, 40 mg at 05/02/21 1641    metoprolol succinate (TOPROL-XL) 24 hr tablet 150 mg, 150 mg, Oral, Daily, Mukesh Paige MD, 150 mg at 05/02/21 0850    pantoprazole (PROTONIX) EC tablet 40 mg, 40 mg, Oral, Early Morning, Mukesh Paige MD, 40 mg at 05/02/21 0549    warfarin (COUMADIN) tablet 5 mg, 5 mg, Oral, Daily (warfarin), Gloria Tomas MD, 5 mg at 05/02/21 7278    Past Medical History:     Past Medical History:   Diagnosis Date    Aneurysm of thoracic aorta ([address] )     last assessed 11/20/17    Anxiety     currently on no meds    Cardiac disease     Cholelithiasis     Chronic kidney disease     GERD (gastroesophageal reflux disease)     Headache due to anesthesia     Hyperlipidemia     Hypertension     Irritable bowel syndrome     Kidney stone     Palpitations     PONV (postoperative nausea and vomiting)     Shortness of breath     ? related to acid reflux    Sleep apnea     not sure    Stroke [facility]) 11/2014    TIA (transient ischemic attack)         Past Surgical History:     Past Surgical History:   Procedure Laterality Date    AORTA SURGERY      thoracic - aneurysmorrhaphy    APPENDECTOMY      ASCENDING AORTIC ANEURYSM REPAIR      resolved 8/19/15    CHOLECYSTECTOMY      laparoscopic    CYSTOSCOPY      with removal of object- stent removal    KNEE ARTHROSCOPY Right 1996    with medial meniscus repair    LITHOTRIPSY      renal    MI FRAGMENT KIDNEY STONE/ ESWL Left 5/17/2018    Procedure: LITHROTRIPSY EXTRACORPORAL SHOCKWAVE (ESWL); Surgeon: Annmarie Gupta MD;  Location: [facility]  MAIN [facility];  Service: Urology    THUMB ARTHROSCOPY Right 1976    ligament repair         Allergies: Allergies   Allergen Reactions    Losartan Angioedema    Bactrim [Sulfamethoxazole-Trimethoprim]     Lisinopril      Felt bad, was OK with Zestril brand name      Tramadol            LABORATORY RESULTS:      Lab Results   Component Value Date    HGB 14 3 05/03/2021    HGB 14 1 03/29/2017    HCT 41 9 05/03/2021    HCT 43 2 03/29/2017    WBC 8 38 05/03/2021    WBC 8 1 03/29/2017    WBC 0-5 03/29/2017     Lab Results   Component Value Date    BUN 15 05/03/2021    BUN 10 02/02/2018     03/29/2017    K 3 7 05/03/2021    K 3 8 02/02/2018     (H) 05/03/2021     02/02/2018    GLUCOSE 103 12/08/2014 CREATININE 1 10 05/03/2021    CREATININE 1 18 03/29/2017     Lab Results   Component Value Date    PROTIME 13 9 05/01/2021    PROTIME 13 9 11/13/2014    INR 1 07 05/01/2021    INR 1 09 11/13/2014        DIAGNOSTIC STUDIES: Reviewed  Cta Head And Neck W Wo Contrast    Result Date: 5/3/2021  Impression: CT brain: Subtle areas of decreased attenuation within the brain parenchyma peripherally representing a combination of subacute ischemia and chronic microangiopathic change  Correlate with MRI of the brain dated 5/1/2021 to differentiate subacute from chronic ischemic change  No hemorrhage  CT angiography: The cervical vasculature demonstrates no stenosis, occlusion or thrombosis  The intracranial vasculature demonstrates mild smooth narrowing of M2 branches bilaterally suggesting underlying nonspecific vasculopathy  No occlusion or thrombosis  Workstation performed: [id number]     Mri Brain W Wo Contrast    Result Date: 5/1/2021  Impression: Acute and subacute infarcts are seen in the supra and infratentorial brain suggesting embolic phenomenon  There is advanced chronic microangiopathic changes as well as chronic infarcts in the left corona radiata  This patient was transported to the ER for evaluation   Workstation performed: [id number]

## 2021-05-03 NOTE — ASSESSMENT & PLAN NOTE
Continue metoprolol for rate control  · Eliquis discontinued 05/02/21 and patient started on Warfarin

## 2021-05-03 NOTE — ASSESSMENT & PLAN NOTE
· Continue statin  · Lipid panel (5/2/21) - low HDL (38)  · F/up with PCP outpatient for continued monitoring  · Start on fish oil supplement outpatient

## 2021-05-03 NOTE — PROGRESS NOTES
[address] [facility]  Progress Note - Johnny Tubbs 1960, 64 y o  male MRN: [id number]  [facility] Encounter: [id number]  Primary Care Provider: Alaina Villa MD   Date and time admitted to hospital: 5/1/2021  3:53 PM    * Acute CVA (cerebrovascular accident) [facility])  Assessment & Plan  Patient referred to ED on 05/01/21 after outpatient MRI showed acute and subacute infarctions  · MRI was ordered by ophthalmology  To evaluate hx of 1 month of L central vision deficit  · MRI brain findings (05/01/21)   · Acute and subacute infarcts are seen in the supra and infratentorial brain suggesting embolic phenomenon  · Advanced chronic microangiopathic changes as well as chronic infarcts in the left corona radiata  · CTA head and neck (05/03/21)   · CT brain - Subtle areas of decreased attenuation within brain parenchyma peripherally representing combination of subacute ischemia and chronic microangiopathic change  No hemorrahge  · CT angiography - cervical vascular indicates no stenosis, occlusion, thrombosis  · Intracranial vasculature demonstrates mild smooth narrowing of M2 branches b/l suggesting underlying nonspecific vasculopathy  No occlusion or thrombosis  · Hx significant for 3 prior strokes - last one 03/2020  · Prior history of atrial fibrillation - s/p procedure loop recorder  · Prior to admission, patient was on Eliquis after Xarelto failure  · Eliquis has been discontinued and Coumadin started 5/2 per neurology   They report bridging is not needed  · Will inc warfarin to 10 mg tonight   · Monitor INR, goal 2-3  · NGUYỄN (05/03/21) - pending  · Thrombosis panel pending per neuro   · Already has likely antiphospholipid syndrome per outpatient hematology   · Recommend blood pressure goals SBP is (140-180)  · Continue lipitor, asa  · PT/OT/ST  · PMR following -likely ok to discharge to home w/ continued outpatient therapy    History of stroke  Assessment & Plan  Noted  · For additional w/u as above     Atrial fibrillation [facility])  Assessment & Plan  Continue metoprolol for rate control  · Eliquis discontinued 05/02/21 and patient started on Warfarin     H/O aortic aneurysm repair  Assessment & Plan  Repaired in 2014 with possible postprocedural history of AFib  · Patient has failed Xarelto in 2020, and was placed on Eliquis 5 mg b i d  · Per neurology, Eliquis discontinued and patient started on Coumadin 05/02/21  · Monitor INR   · He does follow-up with outpatient Cardiology    CORIE (obstructive sleep apnea)  Assessment & Plan  · Patient does not wish for CPAP    Antiphospholipid antibody with hypercoagulable state [facility])  Assessment & Plan  Noted  · Hematology was consulted however I d/w them on 5/2, at this time the recommendations are for lifelong [facility], agents to be chosen by neurology  · Known to them as outpatient  · Thrombosis panel pending (5/3/21)    Obesity  Assessment & Plan  Body mass index is 39 53 kg/m²  · Recommend weight loss  · F/up with PCP outpatient for continued management, lifestyle modifications    Hyperlipidemia  Assessment & Plan  · Continue statin  · Lipid panel (5/2/21) - low HDL (38)  · F/up with PCP outpatient for continued monitoring  · Start on fish oil supplement outpatient  GERD (gastroesophageal reflux disease)  Assessment & Plan  · Continue Protonix    Hypertension  Assessment & Plan  · Continue metoprolol and Norvasc        VTE Pharmacologic Prophylaxis:   Moderate Risk (Score 3-4) - Pharmacological DVT Prophylaxis Ordered: warfarin (Coumadin)  Patient Centered Rounds: I performed bedside rounds with nursing staff today  Discussions with Specialists or Other Care Team Provider: neuro    Education and Discussions with Family / Patient: Patient declined call to   Time Spent for Care: 30 minutes  More than 50% of total time spent on counseling and coordination of care as described above      Current Length of Stay: 2 day(s)  Current Patient Status: Inpatient   Certification Statement: The patient will continue to require additional inpatient hospital stay due to as above  Discharge Plan: Anticipate discharge in 24-48 hrs to home  Code Status: Level 1 - Full Code    Subjective:   Doing well  Post NGUYỄN  No complaints  Same chronic blurry vision at times  Objective:     Vitals:   Temp (24hrs), Av 2 °F (36 8 °C), Min:97 9 °F (36 6 °C), Max:98 6 °F (37 °C)    Temp:  [97 9 °F (36 6 °C)-98 6 °F (37 °C)] 97 9 °F (36 6 °C)  HR:  [54-64] 58  Resp:  [16-18] 16  BP: (133-152)/(81-89) 133/81  SpO2:  [92 %-100 %] 94 %  Body mass index is 39 53 kg/m²  Input and Output Summary (last 24 hours): Intake/Output Summary (Last 24 hours) at 5/3/2021 1356  Last data filed at 5/3/2021 0935  Gross per 24 hour   Intake 730 ml   Output 0 ml   Net 730 ml       Physical Exam:   Physical Exam  Vitals signs and nursing note reviewed  Constitutional:       General: He is not in acute distress  Appearance: He is obese  Cardiovascular:      Rate and Rhythm: Normal rate and regular rhythm  Pulmonary:      Effort: No respiratory distress  Abdominal:      General: There is no distension  Musculoskeletal:      Right lower leg: No edema  Left lower leg: No edema  Neurological:      Mental Status: He is oriented to person, place, and time     Psychiatric:      Comments: Flat           Additional Data:     Labs:  Results from last 7 days   Lab Units 21  0501   WBC Thousand/uL 8 38   HEMOGLOBIN g/dL 14 3   HEMATOCRIT % 41 9   PLATELETS Thousands/uL 180   NEUTROS PCT % 69   LYMPHS PCT % 19   MONOS PCT % 7   EOS PCT % 4     Results from last 7 days   Lab Units 21  0501 21  0558   SODIUM mmol/L 141 139   POTASSIUM mmol/L 3 7 3 7   CHLORIDE mmol/L 109* 107   CO2 mmol/L 26 28   BUN mg/dL 15 12   CREATININE mg/dL 1 10 1 08   ANION GAP mmol/L 6 4   CALCIUM mg/dL 8 9 8 8   ALBUMIN g/dL  --  3 4*   TOTAL BILIRUBIN mg/dL  --  1 85*   ALK PHOS U/L  --  145*   ALT U/L  --  44   AST U/L  --  21   GLUCOSE RANDOM mg/dL 109 110     Results from last 7 days   Lab Units 05/03/21  1050   INR  1 15         Results from last 7 days   Lab Units 05/02/21  0558   HEMOGLOBIN A1C % 5 3           Lines/Drains:  Invasive Devices     Peripheral Intravenous Line            Peripheral IV 05/01/21 Right;Ventral (anterior) Antecubital 1 day                  Telemetry:  Telemetry Orders (From admission, onward)             48 Hour Telemetry Monitoring  Continuous x 48 hours     Question:  Reason for 48 Hour Telemetry  Answer:  Acute CVA (<24 hrs old, hemispheric strokes, selected brainstem strokes, cardiac arrhythmias)                 Telemetry Reviewed: Sinus Bradycardia  Indication for Continued Telemetry Use: Acute CVA           Imaging: No pertinent imaging reviewed  Recent Cultures (last 7 days):   Results from last 7 days   Lab Units 04/27/21  0908   URINE CULTURE  <10,000 cfu/ml        Last 24 Hours Medication List:   Current Facility-Administered Medications   Medication Dose Route Frequency Provider Last Rate    amLODIPine  5 mg Oral Daily Rosalinda Mondragon MD      aspirin  81 mg Oral Daily Rosalinda Mondragon MD      atorvastatin  40 mg Oral Daily With Sahil Resendiz MD      metoprolol succinate  150 mg Oral Daily Rosalinda Mondragon MD      pantoprazole  40 mg Oral Early Morning Rosalinda Mondragon MD      warfarin  10 mg Oral Daily (warfarin) Virginia Dalal PA-C          Today, Patient Was Seen By: Virginia Dalal PA-C    **Please Note: This note may have been constructed using a voice recognition system  **

## 2021-05-03 NOTE — PROGRESS NOTES
NEUROLOGY RESIDENCY PROGRESS NOTE     Name: Jose Robbins   Age & Sex: 64 y o  male   MRN: [id number]  Unit/Bed#: [facility] 707-01   Encounter: [id number]    Jose Robbins will need follow up in in 4 weeks with neurovascular attending or advance practitioner  ASSESSMENT & PLAN     * Acute CVA (cerebrovascular accident) [facility])  Assessment & Plan  Assessment:  Jose oRbbins is a 64 y o   male with a pertinent past medical history consisting of thoracic aortic aneurysm repair in 2014, history of prior AFib postprocedural, prior embolic strokes, bicuspid aortic valve, hypertension, hyperlipidemia, previously on Xarelto and currently on Eliquis who presented on 05/01/2021 due to further evaluation after an MRI was done today which showed acute and subacute areas of stroke  · Pertinent past medical history   Patient was found to have a thoracic aortic aneurysm after screening exams, it was repaired in 2014 and was noted to have postprocedural AFib however given that there was no objective recurrence he was not initially started on anticoagulation, however hand 2020 patient was found to have a subacute infarcts predominantly in right posterior frontal and for that reason he was started on Xarelto  Patient was again seen in the hospital on 03/2020 at that time he came in with right hemiparesis MRI was significant for left frontal acute infarction which appear to be embolic it was thought that this was Xarelto failure and patient was started on Eliquis 5 mg b i d  5/2 CTA negative for LVO, MRI 5/2 show subacute infarction embolic in origin     · LDL- 67, cholesterol-117  · Impression:  Patient reports compliance with this medication this could be possibly due to Eliquis failure, switched to Coumadin 5/2    Plan:  · Continue warfarin with INR goal 2-3  · Pending NGUYỄN  · Thrombosis panel- pending  · Recommend blood pressure goals SBP is (140-180)  · Continue aspirin And Lipitor at this time  · Stat CT head in the event of change in neuro status    H/O aortic aneurysm repair  Assessment & Plan  · Repaired in  with possible postprocedural history of AFib  · Patient has failed Xarelto in , and was placed on Eliquis 5 mg b i d   · He does follow-up with outpatient Cardiology      SUBJECTIVE     Patient was seen and examined  No acute events overnight  Patient had NGUYỄN done this morning  Pending report  He was started on Coumadin 5/  He continues to have a spot in his visual field if looking from right eye or left eye or both eyes open  Pertinent Negatives include: numbness, weakness, speech or visual changes, headaches, paralysis/weakness, numbness or tingling, involuntary movements, tremor, speech impairment       OBJECTIVE     Patient ID: Xi Garcia is a 64 y o  male  Vitals:    21 0755 21 1130 21 1146 21 1151   BP: 152/89 133/81  133/81   Pulse: 64 57 58    Resp: 18 16     Temp: 98 6 °F (37 °C)  97 9 °F (36 6 °C)    TempSrc:       SpO2: 92% 93% 94%    Weight:       Height:          Temperature:   Temp (24hrs), Av 2 °F (36 8 °C), Min:97 9 °F (36 6 °C), Max:98 6 °F (37 °C)    Temperature: 97 9 °F (36 6 °C)      Physical Exam  Vitals signs and nursing note reviewed  Constitutional:       Appearance: He is well-developed  HENT:      Head: Normocephalic and atraumatic  Eyes:      Conjunctiva/sclera: Conjunctivae normal    Neck:      Musculoskeletal: Neck supple  Cardiovascular:      Rate and Rhythm: Normal rate and regular rhythm  Heart sounds: No murmur  Pulmonary:      Effort: Pulmonary effort is normal  No respiratory distress  Breath sounds: Normal breath sounds  Abdominal:      Palpations: Abdomen is soft  Tenderness: There is no abdominal tenderness  Skin:     General: Skin is warm and dry  Neurological:      Mental Status: He is alert  Neurological Examination:      Mental Status:  The patient was awake, alert, attentive, oriented to person, place, and time  No dysarthria or aphasia noted     Cranial Nerves:   I: smell Not tested   II: visual fields Full to confrontation  Pupils equal, round, reactive to light with normal accomodation  Patient reports a spot in his visual field with either or both eyes open  III,IV,VI: extraocular muscles EOMI, no nystagmus   V: masseter and pterygoid strength full  Sensation in the V1 through V3 distributions intact to pinprick and light touch bilaterally  VII: Face is symmetric with no weakness noted  VIII: Audition intact to finger rub bilaterally  IX/X: Uvula midline  Soft palate elevation symmetric  XI: Trapezius and SCM strength 5/5 B/L  XII: Tongue midline with no atrophy or fasciculations with appropriate movement       Motor Examination:   No pronator drift  Bulk: Normal  No atrophy Tone: Normal  Fasciculations: None                   Deltoid Biceps Triceps WE   WF   FF IO     Right        5         5          5         5      5      5   5        Left           5        5          5          5      5     5   5                        IP        Quad   Ham     TA       Gastroc   Right      5            5          5         5                5  Left         5            5         5         5                5         Reflexes:                   Biceps Brachioradialis Triceps Patella Achilles Plantars   Right          2+            2+                  2+        2+       2+         Down   Left            2+             2+                 2+         2+       2+         Down      Clonus: None     Coordination: Patient able to perform normal finger-to-nose and heel to shin appropriately  Normal rapid alternating movements       Sensory: Normal sensation to light touch, pin prick and vibratory sensation throughout       Gait:  Not tested    LABORATORY DATA     Labs: I have personally reviewed pertinent reports      Results from last 7 days   Lab Units 05/03/21  9020 05/02/21  8161 05/01/21  1638   WBC Thousand/uL 8 38 8 34 9 68   HEMOGLOBIN g/dL 14 3 14 4 14 7   HEMATOCRIT % 41 9 42 3 43 0   PLATELETS Thousands/uL 180 171 195   NEUTROS PCT % 69  --  72   MONOS PCT % 7  --  7      Results from last 7 days   Lab Units 05/03/21  0501 05/02/21  0558 05/01/21  1638   SODIUM mmol/L 141 139 141   POTASSIUM mmol/L 3 7 3 7 3 7   CHLORIDE mmol/L 109* 107 107   CO2 mmol/L 26 28 27   BUN mg/dL 15 12 12   CREATININE mg/dL 1 10 1 08 1 07   CALCIUM mg/dL 8 9 8 8 9 2   ALK PHOS U/L  --  145*  --    ALT U/L  --  44  --    AST U/L  --  21  --      Results from last 7 days   Lab Units 05/02/21  0558   MAGNESIUM mg/dL 2 1          Results from last 7 days   Lab Units 05/03/21  1050 05/01/21  1638   INR  1 15 1 07   PTT seconds  --  29               IMAGING & DIAGNOSTIC TESTING     Radiology Results: I have personally reviewed pertinent reports  CTA head and neck w wo contrast   Final Result by Zach Partida DO (05/03 5612)      CT brain: Subtle areas of decreased attenuation within the brain parenchyma peripherally representing a combination of subacute ischemia and chronic microangiopathic change  Correlate with MRI of the brain dated 5/1/2021 to differentiate subacute from    chronic ischemic change  No hemorrhage  CT angiography: The cervical vasculature demonstrates no stenosis, occlusion or thrombosis  The intracranial vasculature demonstrates mild smooth narrowing of M2 branches bilaterally suggesting underlying nonspecific vasculopathy  No occlusion or thrombosis  Workstation performed: [id number]             Other Diagnostic Testing: I have personally reviewed pertinent reports        ACTIVE MEDICATIONS     Current Facility-Administered Medications   Medication Dose Route Frequency    amLODIPine (NORVASC) tablet 5 mg  5 mg Oral Daily    aspirin (ECOTRIN LOW STRENGTH) EC tablet 81 mg  81 mg Oral Daily    atorvastatin (LIPITOR) tablet 40 mg  40 mg Oral Daily With Dinner    metoprolol succinate (TOPROL-XL) 24 hr tablet 150 mg  150 mg Oral Daily    pantoprazole (PROTONIX) EC tablet 40 mg  40 mg Oral Early Morning    warfarin (COUMADIN) tablet 5 mg  5 mg Oral Daily (warfarin)       Prior to Admission medications    Medication Sig Start Date End Date Taking? Authorizing Provider   amLODIPine (NORVASC) 5 mg tablet Take 1 tablet (5 mg total) by mouth daily 9/16/20  Yes Tanika Peacock MD   aspirin (ECOTRIN LOW STRENGTH) 81 mg EC tablet Take 1 tablet (81 mg total) by mouth daily 3/27/20  Yes Aidee oGrdillo MD   atorvastatin (LIPITOR) 40 mg tablet TAKE 1 TABLET BY MOUTH DAILY WITH DINNER 3/24/21  Yes Tanika Peacock MD   metoprolol succinate (TOPROL-XL) 50 mg 24 hr tablet TAKE 3 TABLETS BY MOUTH EVERY DAY 1/14/21  Yes Tanika Peacock MD   pantoprazole (PROTONIX) 40 mg tablet TAKE 1 TABLET BY MOUTH EVERY DAY 3/31/21  Yes Tanika Peacock MD   apixaban (ELIQUIS) 5 mg Take 1 tablet (5 mg total) by mouth 2 (two) times a day 6/25/20 4/30/21  Tanika Peacock MD   cholecalciferol (VITAMIN D3) 1,000 units tablet Take 1 tablet (1,000 Units total) by mouth daily 3/30/20 4/30/21  Tanika Peacock MD   diazepam (VALIUM) 5 mg tablet Take 2 tablets (10 mg total) by mouth 2 (two) times a day as needed for anxiety  Patient not taking: Reported on 4/30/2021 4/16/21   Negrito Pyle DO   docusate sodium (COLACE) 100 mg capsule Take 1 capsule (100 mg total) by mouth daily  Patient not taking: Reported on 4/30/2021 2/25/21   Tanika Peacock MD         VTE Pharmacologic Prophylaxis: Warfarin (Coumadin)  VTE Mechanical Prophylaxis: sequential compression device    ==  MD Eloise [facility]zman's Neurology Residency, PGY-2

## 2021-05-03 NOTE — RESTORATIVE TECHNICIAN NOTE
Restorative Specialist Mobility Note       Activity: Ambulate in berkowitz, Ambulate in room, Bathroom privileges, Chair, Dangle, Stand at bedside(Educated/encouraged pt to ambulate with assistance 3-4 x's/day   Pt callbell, phone/tray within reach )     Assistive Device: None          Araceli CESAR, Restorative Technician, [employer] [facility]

## 2021-05-03 NOTE — APP STUDENT NOTE
QUINCY STUDENT  Inpatient Progress Note for TRAINING ONLY  Not Part of Legal Medical Record     Progress Note - Peter Fried 64 y o  male MRN: [id number]  Unit/Bed#: [facility] 707-01 Encounter: [id number]        * Acute CVA (cerebrovascular accident) [facility])  Assessment & Plan  Patient referred to ED on 05/01/21 after outpatient MRI showed acute and subacute infarctions  · MRI was ordered by ophthalmology  To evaluate hx of 1 month of L central vision deficit  · MRI brain findings (05/01/21)   · Acute and subacute infarcts are seen in the supra and infratentorial brain suggesting embolic phenomenon  · Advanced chronic microangiopathic changes as well as chronic infarcts in the left corona radiata  · CTA head and neck (05/03/21)   · CT brain - Subtle areas of decreased attenuation within brain parenchyma peripherally representing combination of subacute ischemia and chronic microangiopathic change  No hemorrahge  · CT angiography - cervical vascular indicates no stenosis, occlusion, thrombosis  · Intracranial vasculature demonstrates mild smooth narrowing of M2 branches b/l suggesting underlying nonspecific vasculopathy  No occlusion or thrombosis  · Hx significant for 3 prior strokes - last one 03/2020  · Prior history of atrial fibrillation - s/p procedure loop recorder  · Prior to admission, patient was on Eliquis after Xarelto failure  · Eliquis has been discontinued and Coumadin started per neurology   Will confirm whether or not bridging necessary  · Monitor INR, goal 2-3  · NGUYỄN (05/03/21) - pending  · Thrombosis panel pending per neuro   · Already has likely antiphospholipid syndrome per outpatient hematology   · Recommend blood pressure goals SBP is (140-180)  · Continue lipitor, asa  · PT/OT/ST  · PMR following -likely ok to discharge to home w/ continued outpatient therapy    Antiphospholipid antibody with hypercoagulable state [facility])  Assessment & Plan  Noted  · Hematology was consulted,f/u recs  · Known to them as outpatient  · Thrombosis panel pending (5/3/21)    History of stroke  Assessment & Plan  Noted  · For additional w/u as above     CORIE (obstructive sleep apnea)  Assessment & Plan  · Patient does not wish for CPAP    Obesity  Assessment & Plan  Body mass index is 39 53 kg/m²  · Recommend weight loss  · F/up with PCP outpatient for continued management, lifestyle modifications    Hyperlipidemia  Assessment & Plan  · Continue statin  · Lipid panel (5/2/21) - low HDL (38)  · F/up with PCP outpatient for continued monitoring  · Start on fish oil supplement outpatient  Atrial fibrillation ([facility] [address] )  Assessment & Plan  · Continue metoprolol for rate control  · Eliquis discontinued 05/02/21 and patient started on Warfarin     GERD (gastroesophageal reflux disease)  Assessment & Plan  · Continue Protonix    Hypertension  Assessment & Plan  · Continue metoprolol and Norvasc    H/O aortic aneurysm repair  Assessment & Plan  Repaired in 2014 with possible postprocedural history of AFib  · Patient has failed Xarelto in 2020, and was placed on Eliquis 5 mg b i d  · Per neurology, Eliquis discontinued and patient started on Coumadin 05/02/21  · Monitor INR   · He does follow-up with outpatient Cardiology       VTE Pharmacologic Prophylaxis:   Pharmacologic: Warfarin (Coumadin)  Mechanical VTE Prophylaxis in Place: Yes    Patient Centered Rounds: I have performed bedside rounds with nursing staff today  Discussions with Specialists or Other Care Team Provider: RN, Neurology    Education and Discussions with Family / Patient: Neurology    Time Spent for Care: 30 minutes  More than 50% of total time spent on counseling and coordination of care as described above      Current Length of Stay: 2 day(s)    Current Patient Status: Inpatient   Certification Statement: The patient will continue to require additional inpatient hospital stay due to pending NGUYỄN and observation    Discharge Plan: None yet    Code Status: Level 1 - Full Code    Subjective:   Patient states he feels well  Denies pain  Says his head feels, "a little fuzzy," but no headache  No chest pain, SOB  NGUYỄN pending  Objective:     Vitals:   Temp (24hrs), Av 2 °F (36 8 °C), Min:97 9 °F (36 6 °C), Max:98 6 °F (37 °C)    Temp:  [97 9 °F (36 6 °C)-98 6 °F (37 °C)] 97 9 °F (36 6 °C)  HR:  [54-64] 58  Resp:  [16-18] 16  BP: (133-152)/(81-89) 133/81  SpO2:  [92 %-100 %] 94 %  Body mass index is 39 53 kg/m²  Input and Output Summary (last 24 hours): Intake/Output Summary (Last 24 hours) at 5/3/2021 1349  Last data filed at 5/3/2021 0935  Gross per 24 hour   Intake 730 ml   Output 0 ml   Net 730 ml       Physical Exam:     Physical Exam  Vitals signs and nursing note reviewed  Constitutional:       Appearance: He is overweight  HENT:      Head: Normocephalic and atraumatic  Cardiovascular:      Rate and Rhythm: Rhythm irregular  Pulmonary:      Effort: Pulmonary effort is normal       Breath sounds: Wheezing present  Abdominal:      General: Bowel sounds are normal       Palpations: Abdomen is soft  Skin:     General: Skin is warm and dry  Neurological:      Mental Status: He is alert  Mental status is at baseline  Psychiatric:         Attention and Perception: Attention normal          Mood and Affect: Mood normal  Affect is flat  Behavior: Behavior is cooperative  Thought Content:  Thought content normal      Historical Information   Past Medical History:   Diagnosis Date    Aneurysm of thoracic aorta ([facility] [address] )     last assessed 17    Anxiety     currently on no meds    Cardiac disease     Cholelithiasis     Chronic kidney disease     GERD (gastroesophageal reflux disease)     Headache due to anesthesia     Hyperlipidemia     Hypertension     Irritable bowel syndrome     Kidney stone     Palpitations     PONV (postoperative nausea and vomiting)     Shortness of breath     ? related to acid reflux    Sleep apnea     not sure    Stroke [facility]) 11/2014    TIA (transient ischemic attack)      Past Surgical History:   Procedure Laterality Date    AORTA SURGERY      thoracic - aneurysmorrhaphy    APPENDECTOMY      ASCENDING AORTIC ANEURYSM REPAIR      resolved 8/19/15    CHOLECYSTECTOMY      laparoscopic    CYSTOSCOPY      with removal of object- stent removal    KNEE ARTHROSCOPY Right 1996    with medial meniscus repair    LITHOTRIPSY      renal    MN FRAGMENT KIDNEY STONE/ ESWL Left 5/17/2018    Procedure: LITHROTRIPSY EXTRACORPORAL SHOCKWAVE (ESWL); Surgeon: Nita Hussein MD;  Location: [facility];  Service: Urology    THUMB ARTHROSCOPY Right 1976    ligament repair     Social History   Social History     Substance and Sexual Activity   Alcohol Use Not Currently     Social History     Substance and Sexual Activity   Drug Use No     Social History     Tobacco Use   Smoking Status Never Smoker   Smokeless Tobacco Never Used   Tobacco Comment    no second hand smoke exposure     Family History:   Family History   Problem Relation Age of Onset    Diverticulitis Mother         of colon    Nephrolithiasis Mother     Emphysema Father     Nephrolithiasis Sister     Heart attack Maternal Grandfather 72    Breast cancer Paternal Grandmother     Lung cancer Paternal Grandfather     Cancer Family     Coronary artery disease Family     Diabetes Family         sibling    Hypertension Family         sibling    Hernia Family         sibling       Meds/Allergies   all medications and allergies reviewed  Allergies   Allergen Reactions    Losartan Angioedema    Bactrim [Sulfamethoxazole-Trimethoprim]     Lisinopril      Felt bad, was OK with Zestril brand name      Tramadol        Additional Data:     Labs:    Results from last 7 days   Lab Units 05/03/21  0501   WBC Thousand/uL 8 38   HEMOGLOBIN g/dL 14 3   HEMATOCRIT % 41 9   PLATELETS Thousands/uL 180   NEUTROS PCT % 69   LYMPHS PCT % 19   MONOS PCT % 7   EOS PCT % 4     Results from last 7 days   Lab Units 05/03/21  0501 05/02/21  0558   SODIUM mmol/L 141 139   POTASSIUM mmol/L 3 7 3 7   CHLORIDE mmol/L 109* 107   CO2 mmol/L 26 28   BUN mg/dL 15 12   CREATININE mg/dL 1 10 1 08   ANION GAP mmol/L 6 4   CALCIUM mg/dL 8 9 8 8   ALBUMIN g/dL  --  3 4*   TOTAL BILIRUBIN mg/dL  --  1 85*   ALK PHOS U/L  --  145*   ALT U/L  --  44   AST U/L  --  21   GLUCOSE RANDOM mg/dL 109 110     Results from last 7 days   Lab Units 05/03/21  1050   INR  1 15         Results from last 7 days   Lab Units 05/02/21  0558   HEMOGLOBIN A1C % 5 3             * I Have Reviewed All Lab Data Listed Above  * Additional Pertinent Lab Tests Reviewed: [address] Admission Reviewed    Imaging:    Imaging Reports Reviewed Today Include: CTA head and neck, MRI brain   Imaging Personally Reviewed by Myself Includes:  None    Recent Cultures (last 7 days):     Results from last 7 days   Lab Units 04/27/21  0908   URINE CULTURE  <10,000 cfu/ml        Last 24 Hours Medication List:   Current Facility-Administered Medications   Medication Dose Route Frequency Provider Last Rate    amLODIPine  5 mg Oral Daily Mary Rico MD      aspirin  81 mg Oral Daily Mary Rico MD      atorvastatin  40 mg Oral Daily With Olaf Sawant MD      metoprolol succinate  150 mg Oral Daily Mary Rico MD      pantoprazole  40 mg Oral Early Morning Mary Rico MD      warfarin  5 mg Oral Daily (warfarin) Terence Crawford MD          Today, Patient Was Seen By: Nicole Sawant    ** Please Note: Dictation voice to text software may have been used in the creation of this document   **

## 2021-05-03 NOTE — TELEPHONE ENCOUNTER
Reviewed patient chart, inpatient at [facility] at this time  Admitted under stroke pathway  Plan per inpatient therapy recommendations is for patient to be discharged to home with therapy (and FTD) when medically cleared  Called patient on hospital room phone  States he is just getting back from his NGUYỄN and is sleepy  He would like to follow up at a later time       I will continue to follow up during hospitalization with inpatient neurology recommendations as well as after hospital discharge to assist

## 2021-05-03 NOTE — ASSESSMENT & PLAN NOTE
Repaired in 2014 with possible postprocedural history of AFib  · Patient has failed Xarelto in 2020, and was placed on Eliquis 5 mg b i d    · Per neurology, Eliquis discontinued and patient started on Coumadin 05/02/21  · Monitor INR   · He does follow-up with outpatient Cardiology

## 2021-05-04 ENCOUNTER — TRANSITIONAL CARE MANAGEMENT (OUTPATIENT)
Dept: FAMILY MEDICINE CLINIC | Facility: CLINIC | Age: 61
End: 2021-05-04

## 2021-05-04 ENCOUNTER — TELEPHONE (OUTPATIENT)
Dept: FAMILY MEDICINE CLINIC | Facility: CLINIC | Age: 61
End: 2021-05-04

## 2021-05-04 VITALS
TEMPERATURE: 98.3 F | WEIGHT: 260 LBS | HEIGHT: 68 IN | OXYGEN SATURATION: 95 % | DIASTOLIC BLOOD PRESSURE: 81 MMHG | SYSTOLIC BLOOD PRESSURE: 144 MMHG | BODY MASS INDEX: 39.4 KG/M2 | RESPIRATION RATE: 17 BRPM | HEART RATE: 51 BPM

## 2021-05-04 DIAGNOSIS — Z79.01 ANTICOAGULATED ON COUMADIN: ICD-10-CM

## 2021-05-04 DIAGNOSIS — I63.10 CEREBROVASCULAR ACCIDENT (CVA) DUE TO EMBOLISM OF PRECEREBRAL ARTERY (HCC): Primary | ICD-10-CM

## 2021-05-04 LAB
INR PPP: 1.1 (ref 0.84–1.19)
PROT S ACT/NOR PPP: 131 % (ref 57–157)
PROT S PPP-ACNC: 86 % (ref 60–150)
PROTHROMBIN TIME: 14.2 SECONDS (ref 11.6–14.5)

## 2021-05-04 PROCEDURE — 85610 PROTHROMBIN TIME: CPT | Performed by: INTERNAL MEDICINE

## 2021-05-04 PROCEDURE — 99239 HOSP IP/OBS DSCHRG MGMT >30: CPT | Performed by: INTERNAL MEDICINE

## 2021-05-04 RX ORDER — WARFARIN SODIUM 5 MG/1
5 TABLET ORAL
Qty: 30 TABLET | Refills: 0 | Status: SHIPPED | OUTPATIENT
Start: 2021-05-04 | End: 2021-06-04 | Stop reason: SDUPTHER

## 2021-05-04 RX ADMIN — METOPROLOL SUCCINATE 150 MG: 100 TABLET, EXTENDED RELEASE ORAL at 09:35

## 2021-05-04 RX ADMIN — ASPIRIN 81 MG: 81 TABLET, COATED ORAL at 09:35

## 2021-05-04 RX ADMIN — AMLODIPINE BESYLATE 5 MG: 5 TABLET ORAL at 09:36

## 2021-05-04 RX ADMIN — PANTOPRAZOLE SODIUM 40 MG: 40 TABLET, DELAYED RELEASE ORAL at 05:59

## 2021-05-04 NOTE — ASSESSMENT & PLAN NOTE
Patient referred to ED on 05/01/21 after outpatient MRI showed acute and subacute infarctions  · MRI was ordered by ophthalmology  To evaluate hx of 1 month of L central vision deficit  · MRI brain findings (05/01/21)   · Acute and subacute infarcts are seen in the supra and infratentorial brain suggesting embolic phenomenon  · Advanced chronic microangiopathic changes as well as chronic infarcts in the left corona radiata  · CTA head and neck (05/03/21)   · CT brain - Subtle areas of decreased attenuation within brain parenchyma peripherally representing combination of subacute ischemia and chronic microangiopathic change  No hemorrahge  · CT angiography - cervical vascular indicates no stenosis, occlusion, thrombosis  · Intracranial vasculature demonstrates mild smooth narrowing of M2 branches b/l suggesting underlying nonspecific vasculopathy  No occlusion or thrombosis  · Hx significant for 3 prior strokes - last one 03/2020  · Prior history of atrial fibrillation - s/p procedure loop recorder  · Prior to admission, patient was on Eliquis after Xarelto failure  · Eliquis has been discontinued and Coumadin started 5/2 per neurology  I confirmed with them that bridging is not necessary, they also do not need a therapeutic INR for discharge  · Will discharged with 5 mg warfarin tablets with an INR on 05/06 with results to PCP  I spoke with PCP personally who agreed to have his office manage the patient's INR going forward    · INR Goal 2-3  · NGUYỄN (05/03/21) -negative  · Thrombosis panel pending per neuro   · Already has likely antiphospholipid syndrome per outpatient hematology   · Recommend blood pressure goals SBP is (140-180)  · Continue lipitor, asa  · PT/OT/ST--no needs

## 2021-05-04 NOTE — ASSESSMENT & PLAN NOTE
Repaired in 2014 with possible postprocedural history of AFib  · Patient has failed Xarelto in 2020, and was placed on Eliquis 5 mg b i d    · Per neurology, Eliquis discontinued and patient started on Coumadin 05/02/21  · Continue aspirin  · Monitor INR   · He does follow-up with outpatient Cardiology

## 2021-05-04 NOTE — DISCHARGE INSTRUCTIONS
Please get blood work (INR) drawn on 05/06  Results will go to your primary care doctor  They should call you to go over further Coumadin instructions  Please read below about Coumadin  Warfarin (By mouth)   Warfarin (WAR-far-in)  Prevents and treats blood clots  May lower the risk of serious complications after a heart attack  This medicine is a blood thinner  Brand Name(s): Coumadin, Jantoven   There may be other brand names for this medicine  When This Medicine Should Not Be Used: This medicine is not right for everyone  Do not use it if you had an allergic reaction to warfarin, if you are pregnant, or if you have health problems that could cause bleeding  How to Use This Medicine:   Tablet  · Take your medicine as directed  Your dose may need to be changed several times to find what works best for you  · This medicine should come with a Medication Guide  Ask your pharmacist for a copy if you do not have one  · Missed dose: Take a dose as soon as you remember  If it is almost time for your next dose, wait until then and take a regular dose  Do not take extra medicine to make up for a missed dose  · Store the medicine in a closed container at room temperature, away from heat, moisture, and direct light  Drugs and Foods to Avoid:   Ask your doctor or pharmacist before using any other medicine, including over-the-counter medicines, vitamins, and herbal products  · Many medicines and foods can affect how warfarin works and may affect the PT/INR test results  Tell your doctor before you start or stop any medicine, especially the following:   ? Co-enzyme R01, echinacea, garlic, ginkgo, ginseng, goldenseal, or Clementina's wort  ? Another blood thinner, including apixaban, argatroban, bivalirudin, cilostazol, clopidogrel, dabigatran, desirudin, dipyridamole, heparin, lepirudin, prasugrel, rivaroxaban, ticlopidine  ?  Medicine to treat depression or anxiety, including citalopram, desvenlafaxine, duloxetine, escitalopram, fluoxetine, fluvoxamine, milnacipran, paroxetine, sertraline, venlafaxine, vilazodone  ? Medicine to treat an infection  ? NSAID pain or arthritis medicine, including aspirin, celecoxib, diclofenac, diflunisal, fenoprofen, ibuprofen, indomethacin, ketoprofen, ketorolac, mefenamic acid, naproxen, oxaprozin, piroxicam, sulindac  Check labels for over-the-counter medicines to find out if they contain an NSAID   ? Steroid medicine, including dexamethasone, hydrocortisone, methylprednisolone, prednisolone, prednisone  · Warfarin works best if you eat about the same amount of vitamin K every day  Foods high in vitamin K include asparagus, broccoli, brussels sprouts, cabbage, green leafy vegetables, plums, rhubarb, and canola oil  Talk to your doctor before you make changes to your normal diet  · Do not eat grapefruit or drink grapefruit juice while you are using this medicine  Warnings While Using This Medicine:   · It is not safe to take this medicine during pregnancy  It could harm an unborn baby  Tell your doctor right away if you become pregnant  Use an effective form of birth control to keep from getting pregnant during treatment and for at least 1 month after your last dose  · Tell your doctor if you are breastfeeding, or if you have kidney disease, liver disease, heart disease, diabetes, heart failure, high blood pressure, an infection, a stomach ulcer, or protein C deficiency  Also tell your doctor if you had recent surgery or an injury, or a history of stroke, anemia, severe bleeding or bruising, or problems caused by heparin use  · This medicine may cause the following problems:   ? Bleeding, which may be life-threatening  ? Gangrene (skin or tissue damage)  ? Calciphylaxis or calcium uremic arteriolopathy  ? Kidney problems, including acute kidney injury  ?  Purple toes syndrome  · You must have a PT/INR blood test while you use this medicine to check how well your blood is clotting  Your doctor will use the test results to make sure the medicine is working properly  Keep all appointments for the PT/INR blood tests  · You may bleed or bruise more easily with warfarin  To prevent injury or cuts, do not play rough sports, be careful with sharp objects, and brush and floss your teeth gently  [place] your nose gently, and do not pick your nose  · Carry an ID card or wear a medical alert bracelet to let emergency caregivers know that you use warfarin  · Tell any doctor or dentist who treats you that you are using this medicine  You may need to stop using this medicine several days before you have surgery or medical tests  · Keep all medicine out of the reach of children  Never share your medicine with anyone  Possible Side Effects While Using This Medicine:   Call your doctor right away if you notice any of these side effects:  · Allergic reaction: Itching or hives, swelling in your face or hands, swelling or tingling in your mouth or throat, chest tightness, trouble breathing  · Bleeding from your gums or nose, coughing up blood, heavy monthly periods  · Bleeding that does not stop, bruising, or weakness  · Dizziness, fainting, lightheadedness  · Pain, brown or black skin, or skin that is cool to the touch  · Purple toes or feet, or new pain in your leg, foot, or toes  · Purplish red, net-like, blotchy spots on the skin  · Red or dark brown urine, or red or black, tarry stools  · Vomiting blood or material that looks like coffee grounds  If you notice other side effects that you think are caused by this medicine, tell your doctor  Call your doctor for medical advice about side effects  You may report side effects to FDA at 6-722-FDA-7702  © Copyright [employer] 2021 Information is for End User's use only and may not be sold, redistributed or otherwise used for commercial purposes  The above information is an  only   It is not intended as medical advice for individual conditions or treatments  Talk to your doctor, nurse or pharmacist before following any medical regimen to see if it is safe and effective for you

## 2021-05-04 NOTE — RESTORATIVE TECHNICIAN NOTE
Restorative Specialist Mobility Note       Activity: Ambulate in berkowitz, Ambulate in room, Bathroom privileges, Chair, Dangle, Stand at bedside(Educated/encouraged pt to ambulate with assistance 3-4 x's/day  Chair alarm on   Pt callbell, phone/tray within reach )     Assistive Device: None          [employer] BS, Restorative Technician, [employer] [facility]

## 2021-05-04 NOTE — CASE MANAGEMENT
IMM reviewed with patient  patient agree with discharge determination  Pt will transport self home at dc  Pt's bedside RN Alis Arteaga aware of same

## 2021-05-04 NOTE — ASSESSMENT & PLAN NOTE
· Continue statin  · Lipid panel (5/2/21) - low HDL (38)     · F/up with PCP outpatient for continued monitoring

## 2021-05-04 NOTE — UTILIZATION REVIEW
Initial Clinical Review    Admission: Date/Time/Statement:   Admission Orders (From admission, onward)     Ordered        05/01/21 1632  Inpatient Admission  Once                   Orders Placed This Encounter   Procedures    Inpatient Admission     Standing Status:   Standing     Number of Occurrences:   1     Order Specific Question:   Level of Care     Answer:   Med Surg [16]     Order Specific Question:   Estimated length of stay     Answer:   More than 2 Midnights     Order Specific Question:   Certification     Answer:   I certify that inpatient services are medically necessary for this patient for a duration of greater than two midnights  See H&P and MD Progress Notes for additional information about the patient's course of treatment  ED Arrival Information     Expected Arrival Acuity Means of Arrival Escorted By Service Admission Type    - 5/1/2021 15:53 Urgent Walk-In Self Hospitalist Urgent    Arrival Complaint    sent by MRI        Chief Complaint   Patient presents with    Evaluation of Abnormal Diagnostic Test     Pt sent from MRI for eval of immediate finding  Initial Presentation:       64year old male presents to ed from MRI for evaluation and treatment of abnormal 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 for embolic CVA  Patient had an MRI as ordered by his ophthalmologist to evaluate visual field defect on the left which has been present for 1 month and associated with progressive lower extremity weakness  Reason for not initiating IV thrombolytic: Containdicated secondary to anticoagulation  Admit to inpatient medical surgical for Acute CVA  Plan includes consult neurology, neuro checks q4 hr, telemetry, PT/OT/Speech evaluations, consult hematology, dysphagia assessment , NGUYỄN, CTA head, urine culture, thrombis panel, lipid panel, hba1c            Date: 5-2-21 Day 2: inpatient med surg  PT/ OT/ Speech evaluations completed  Consult neurology   A/o x 3  Unable to visualize fundi   Non lateralizing exam  Strokes appear embolic, etiology unclear, was on Eliquis and aspirin 81 when he came in  Would like NGUYỄN to look at aortic arch again in particular (no h/o PFO on the NGUYỄN from 2014)  Can continue current meds however given failure of eliquis, will consider coumadin + aspirin  5-3   NGUYỄN done  PMR consult completed with recommendation for outpatient therapy        ED Triage Vitals   05/01/21 1606 05/01/21 1606 05/01/21 1606 05/01/21 1606 05/01/21 1606   98 °F (36 7 °C) 55 16 153/73 98 %      Oral          No Pain          05/01/21 118 kg (260 lb)     Additional Vital Signs:      Date/Time  Temp  Pulse  Resp  BP  MAP (mmHg)  SpO2  O2 Device   05/04/21 08:35:10  98 3 °F (36 8 °C)  51Abnormal   17  144/81  102  95 %  --   05/03/21 22:02:11  98 °F (36 7 °C)  49Abnormal   17  140/81  101  95 %  --   05/03/21 2000  --  --  --  --  --  95 %  None (Room air)   05/03/21 19:38:25  --  52Abnormal   17  139/81  100  95 %  --   05/03/21 1151  --  --  --  133/81  --  --  --   05/03/21 11:46:46  97 9 °F (36 6 °C)  58  --  --  --  94 %  --   05/03/21 11:30:34  --  57  16  133/81  98  93 %  --   05/03/21 07:55:26  98 6 °F (37 °C)  64  18  152/89  110  92 %  --   05/02/21 22:28:07  98 2 °F (36 8 °C)  55  --  141/85  104  95 %  --   05/02/21 2000  --  --  --  --  --  --  None (Room air)   05/02/21 15:40:08  98 1 °F (36 7 °C)  54Abnormal   16  143/87  106  100 %  None (Room air)   05/02/21 07:49:07  98 3 °F (36 8 °C)  71  16  142/89  107  93 %  None (Room air)   05/02/21 06:15:16  97 9 °F (36 6 °C)  59  --  142/89  107  96 %  --   05/02/21 06:13:36  97 9 °F (36 6 °C)  65  --  142/89  107  94 %  --   05/02/21 04:09:11  --  68  --  122/69  87  94 %  --   05/02/21 02:08:38  --  71  --  145/79  101  96 %  --   05/02/21 02:08:29  --  58  --  145/79  101  95 %  --   05/02/21 0006  98 4 °F (36 9 °C)  61  --  143/81  102  94 %  --         Date and Time Eye Opening Best Verbal Response Best Motor Response [place] Coma Scale Score 05/02/21 2000 4 5 6 15   05/02/21 1600 4 5 6 15   05/02/21 1200 4 5 6 15   05/02/21 0749 4 5 6 15   05/02/21 0600 4 5 6 15   05/02/21 0400 4 5 6 15   05/02/21 0208 4 5 6 15   05/02/21 0000 4 5 6 15   05/01/21 2300 4 5 6 15   05/01/21 2200 4 5 6 15   05/01/21 2100 4 5 6 15   05/01/21 2000 4 5 6 15   05/01/21 1600 4 5 6 15             Pertinent Labs/Diagnostic Test Results:     MRI BRAIN WITH AND WITHOUT CONTRAST    Outpatient  5/01/2021 14:28      IMPRESSION:     Acute and subacute infarcts are seen in the supra and infratentorial brain suggesting embolic phenomenon  There is advanced chronic microangiopathic changes as well as chronic infarcts in the left corona radiata   CTA NECK AND BRAIN WITH AND WITHOUT CONTRAST      5/01/2021 22:28       INDICATION: Neuro deficit, acute, stroke suspected  Stroke follow-up   IMPRESSION:     CT brain: Subtle areas of decreased attenuation within the brain parenchyma peripherally representing a combination of subacute ischemia and chronic microangiopathic change  Correlate with MRI of the brain dated 5/1/2021 to differentiate subacute from   chronic ischemic change  No hemorrhage      CT angiography: The cervical vasculature demonstrates no stenosis, occlusion or thrombosis      The intracranial vasculature demonstrates mild smooth narrowing of M2 branches bilaterally suggesting underlying nonspecific vasculopathy    No occlusion or thrombosis        Results from last 7 days   Lab Units 05/03/21  0501 05/02/21  0558 05/01/21  1638   WBC Thousand/uL 8 38 8 34 9 68   HEMOGLOBIN g/dL 14 3 14 4 14 7   HEMATOCRIT % 41 9 42 3 43 0   PLATELETS Thousands/uL 180 171 195   NEUTROS ABS Thousands/µL 5 78  --  6 96         Results from last 7 days   Lab Units 05/03/21  0501 05/02/21  0558 05/01/21  1638   SODIUM mmol/L 141 139 141   POTASSIUM mmol/L 3 7 3 7 3 7   CHLORIDE mmol/L 109* 107 107   CO2 mmol/L 26 28 27   ANION GAP mmol/L 6 4 7   BUN mg/dL 15 12 12   CREATININE mg/dL 1 10 1 08 1 07   EGFR ml/min/1 73sq m 72 74 75   CALCIUM mg/dL 8 9 8 8 9 2   MAGNESIUM mg/dL  --  2 1  --      Results from last 7 days   Lab Units 05/02/21  0558   AST U/L 21   ALT U/L 44   ALK PHOS U/L 145*   TOTAL PROTEIN g/dL 6 5   ALBUMIN g/dL 3 4*   TOTAL BILIRUBIN mg/dL 1 85*         Results from last 7 days   Lab Units 05/03/21  0501 05/02/21  0558 05/01/21  1638   GLUCOSE RANDOM mg/dL 109 110 91         Results from last 7 days   Lab Units 05/02/21  0558   HEMOGLOBIN A1C % 5 3   EAG mg/dl 105       Results from last 7 days   Lab Units 05/04/21  0610 05/03/21  1050 05/01/21  1638   PROTIME seconds 14 2 14 7* 13 9   INR  1 10 1 15 1 07   PTT seconds  --   --  29     ED Treatment:   Medication Administration from 05/01/2021 1553 to 05/01/2021 1835     None        Past Medical History:   Diagnosis Date    Aneurysm of thoracic aorta ([place] [address] )     last assessed 11/20/17    Anxiety     currently on no meds    Cardiac disease     Cholelithiasis     Chronic kidney disease     GERD (gastroesophageal reflux disease)     Headache due to anesthesia     Hyperlipidemia     Hypertension     Irritable bowel syndrome     Kidney stone     Palpitations     PONV (postoperative nausea and vomiting)     Shortness of breath     ? related to acid reflux    Sleep apnea     not sure    Stroke ([place] [address] ) 11/2014    TIA (transient ischemic attack)      Present on Admission:   CORIE (obstructive sleep apnea)   Antiphospholipid antibody with hypercoagulable state ([place] [address] )   Atrial fibrillation ([place] [address] )   Obesity   GERD (gastroesophageal reflux disease)   Hyperlipidemia   Hypertension      Admitting Diagnosis:     Paroxysmal atrial fibrillation ([place] [address] ) [I48 0]  Weakness [R53 1]  Visual field defect [H53 40]  CVA (cerebral vascular accident) ([place] [address] ) [I63 9]  Stroke ([place] [address] ) [I63 9]  Antiphospholipid antibody with hypercoagulable state ([place] [address] ) [D68 61]    Age/Sex: 64 y o  male     Admission Orders:      amLODIPine, 5 mg, Oral, Daily  aspirin, 81 mg, Oral, Daily  atorvastatin, 40 mg, Oral, Daily With Dinner  metoprolol succinate, 150 mg, Oral, Daily  pantoprazole, 40 mg, Oral, Early Morning  warfarin, 10 mg, Oral, Daily (warfarin)      Continuous IV Infusions:     PRN Meds:       IP CONSULT TO CARDIOLOGY  IP CONSULT TO PHYSICAL MEDICINE REHAB  IP CONSULT TO NEUROLOGY  IP CONSULT TO CASE MANAGEMENT  IP CONSULT TO NUTRITION SERVICES    Network Utilization Review Department  ATTENTION: Please call with any questions or concerns to [phone] and carefully listen to the prompts so that you are directed to the right person  All voicemails are confidential   [facility] all requests for admission clinical reviews, approved or denied determinations and any other requests to dedicated fax number below belonging to the campus where the patient is receiving treatment   List of dedicated fax numbers for the Facilities:  [address] DENIALS (Administrative/Medical Necessity) [phone]   [address] (Maternity/NICU/Pediatrics) [phone]   [address] [place] [address] [facility] [phone] [id number] [address] [place] [address] [facility] [phone]

## 2021-05-04 NOTE — DISCHARGE SUMMARY
[address]  Discharge- Gage Cervantes 1960, 64 y o  male MRN: [id number]  Unit/Bed#: [facility] 707-01 Encounter: [id number]  Primary Care Provider: Dina Tripathi MD   Date and time admitted to hospital: 5/1/2021  3:53 PM    * Acute CVA (cerebrovascular accident) [facility])  Assessment & Plan  Patient referred to ED on 05/01/21 after outpatient MRI showed acute and subacute infarctions  · MRI was ordered by ophthalmology  To evaluate hx of 1 month of L central vision deficit  · MRI brain findings (05/01/21)   · Acute and subacute infarcts are seen in the supra and infratentorial brain suggesting embolic phenomenon  · Advanced chronic microangiopathic changes as well as chronic infarcts in the left corona radiata  · CTA head and neck (05/03/21)   · CT brain - Subtle areas of decreased attenuation within brain parenchyma peripherally representing combination of subacute ischemia and chronic microangiopathic change  No hemorrahge  · CT angiography - cervical vascular indicates no stenosis, occlusion, thrombosis  · Intracranial vasculature demonstrates mild smooth narrowing of M2 branches b/l suggesting underlying nonspecific vasculopathy  No occlusion or thrombosis  · Hx significant for 3 prior strokes - last one 03/2020  · Prior history of atrial fibrillation - s/p procedure loop recorder  · Prior to admission, patient was on Eliquis after Xarelto failure  · Eliquis has been discontinued and Coumadin started 5/2 per neurology  I confirmed with them that bridging is not necessary, they also do not need a therapeutic INR for discharge  · Will discharged with 5 mg warfarin tablets with an INR on 05/06 with results to PCP  I spoke with PCP personally who agreed to have his office manage the patient's INR going forward    · INR Goal 2-3  · NGUYỄN (05/03/21) -negative  · Thrombosis panel pending per neuro   · Already has likely antiphospholipid syndrome per outpatient hematology · Recommend blood pressure goals SBP is (140-180)  · Continue lipitor, asa  · PT/OT/ST--no needs    History of stroke  Assessment & Plan  Noted  · For additional w/u as above     Atrial fibrillation ([address] )  Assessment & Plan  Continue metoprolol for rate control  · Eliquis discontinued 05/02/21 and patient started on Warfarin     H/O aortic aneurysm repair  Assessment & Plan  Repaired in 2014 with possible postprocedural history of AFib  · Patient has failed Xarelto in 2020, and was placed on Eliquis 5 mg b i d  · Per neurology, Eliquis discontinued and patient started on Coumadin 05/02/21  · Continue aspirin  · Monitor INR   · He does follow-up with outpatient Cardiology    CORIE (obstructive sleep apnea)  Assessment & Plan  · Patient does not wish for CPAP    Antiphospholipid antibody with hypercoagulable state [facility])  Assessment & Plan  Noted  · Hematology was consulted however I d/w them on 5/2, at this time the recommendations are for lifelong [facility], agents to be chosen by neurology  · Known to them as outpatient  · Thrombosis panel pending (5/3/21)    Obesity  Assessment & Plan  Body mass index is 39 53 kg/m²  · Recommend weight loss  · F/up with PCP outpatient for continued management, lifestyle modifications    Hyperlipidemia  Assessment & Plan  · Continue statin  · Lipid panel (5/2/21) - low HDL (38)     · F/up with PCP outpatient for continued monitoring    GERD (gastroesophageal reflux disease)  Assessment & Plan  · Continue Protonix    Hypertension  Assessment & Plan  · Continue metoprolol and Norvasc      Resolved Problems  Date Reviewed: 5/4/2021    None        Discharging Physician / Practitioner: Hector Mccray PA-C  PCP: Christopher King MD  Admission Date:   Admission Orders (From admission, onward)     Ordered        05/01/21 1632  Inpatient Admission  Once                   Discharge Date: 05/04/21    Consultations During Hospital Stay:  · Neurology  · PM&R    Procedures Performed:   · NGUYỄN 5/3: Negative  · CTA head and neck w wo contrast: CT brain: Subtle areas of decreased attenuation within the brain parenchyma peripherally representing a combination of subacute ischemia and chronic microangiopathic change  Correlate with MRI of the brain dated 5/1/2021 to differentiate subacute from chronic ischemic change  No hemorrhage  CT angiography: The cervical vasculature demonstrates no stenosis, occlusion or thrombosis  The intracranial vasculature demonstrates mild smooth narrowing of M2 branches bilaterally suggesting underlying nonspecific vasculopathy  No occlusion or thrombosis  · MRI Brain w wo contrast: Acute and subacute infarcts are seen in the supra and infratentorial brain suggesting embolic phenomenon  There is advanced chronic microangiopathic changes as well as chronic infarcts in the left corona radiata  · See chart for all labs  · UA:  Negative    Significant Findings / Test Results:   · See above    Incidental Findings:   · See above     Test Results Pending at Discharge (will require follow up):   · none     Outpatient Tests Requested:  · F/u PCP for ongoing INR monitoring  · Needs INR drawn on 05/06  · Follow-up neurology  · Follow up Cardiology  · Follow-up Hematology    Complications:    NONE    Reason for Admission:  Stroke-like symptoms, stroke on MRI    Hospital Course:   Gage Cervantes is a 64 y o  male patient with past medical history of thoracic aortic aneurysm repair 2014, history of prior AFib postprocedure really, prior embolic strokes, bicuspid aortic valve, hypertension, anti phospholipid antibody with hypercoagulable state hyperlipidemia, previously failing Xarelto therapy and presented on Eliquis who originally presented to the hospital on 5/1/2021 due to stroke on outpatient MRI that was obtained for 1 month hx of visual deficits  Was seen in consultation by Neurology  Had NGUYỄN which was negative  Also had CTA head and neck did not show any occlusion    Was recommended to transition from Eliquis to Coumadin and aspirin at discharge  I confirmed with Neurology on day of discharge the patient did not require a therapeutic INR nor bridging  I spoke with his primary care doctor who agreed to have his office manage the INR as an outpatient  Instructed patient for now to take 5 mg of Coumadin daily and have an INR drawn in 2 days with results to PCP, script provided  Please see above list of diagnoses and related plan for additional information  Condition at Discharge: stable    Discharge Day Visit / Exam:   Subjective:  Doing okay today  Vitals: Blood Pressure: 144/81 (05/04/21 0835)  Pulse: (!) 51 (05/04/21 0835)  Temperature: 98 3 °F (36 8 °C) (05/04/21 0835)  Temp Source: Oral (05/02/21 1540)  Respirations: 17 (05/04/21 0835)  Height: 5' 8" (172 7 cm) (05/01/21 2003)  Weight - Scale: 118 kg (260 lb) (05/01/21 2003)  SpO2: 95 % (05/04/21 0835)  Exam:   Physical Exam  Vitals signs and nursing note reviewed  Constitutional:       General: He is not in acute distress  Cardiovascular:      Rate and Rhythm: Normal rate and regular rhythm  Pulmonary:      Effort: No respiratory distress  Abdominal:      General: There is no distension  Musculoskeletal:      Right lower leg: No edema  Left lower leg: No edema  Neurological:      Mental Status: He is oriented to person, place, and time  Psychiatric:         Mood and Affect: Mood normal         Discussion with Family: Patient declined call to   Discharge instructions/Information to patient and family:   See after visit summary for information provided to patient and family  Provisions for Follow-Up Care:  See after visit summary for information related to follow-up care and any pertinent home health orders  Disposition:   Home    Planned Readmission: no     Discharge Statement:  I spent 34 minutes discharging the patient  This time was spent on the day of discharge   I had direct contact with the patient on the day of discharge  Greater than 50% of the total time was spent examining patient, answering all patient questions, arranging and discussing plan of care with patient as well as directly providing post-discharge instructions  Additional time then spent on discharge activities  Discharge Medications:  See after visit summary for reconciled discharge medications provided to patient and/or family        **Please Note: This note may have been constructed using a voice recognition system**

## 2021-05-04 NOTE — TELEPHONE ENCOUNTER
----- Message from Jackelin Baltazar MD sent at 5/4/2021 10:19 AM EDT -----  Regarding: RE: Coumadin Management  Noted  Will be happy to manage him  I will ask my staff to start procedures for this  Thanks in advance for letting us know  I would wonder about that if he had embolic events, i e  why he was not bridged with other medications 1st like Lovenox  Will have to wait for the Neurology final results  Thanks again for taking care of him  Umang Hagan  ----- Message -----  From: Katarzyna Peace PA-C  Sent: 5/4/2021   9:52 AM EDT  To: Jackelin Baltazar MD  Subject: Coumadin Management                              Hi Dr Babs Tafoya! Reaching out regarding one of your outpatients, Samantha Castellano  He was admitted with another CVA  Neurology has recommended switching him to warfarin given recurrent events on the DOACs  Wondering if you would be able to manage his INR going forward or if the cardiologist/hematologist's office should manage it? He does see both of them as outpatient  He is being discharged today and INR is only 1 10 (Neurology did not recommend bridging interestingly?) so I was going to discharge himw with 5 mg tablets and have him get an INR in 2 days (5/6) with results to you  I am going off service after today, so I ask if you are not able to manage the warfarin that you could reach out to his cardiology/hematology office  I did confirm with neurology that they do not manage INRs  Thanks again, hope you're doing well!     Katarzyna Peace PA-C  [facility]

## 2021-05-04 NOTE — PLAN OF CARE
Problem: Neurological Deficit  Goal: Neurological status is stable or improving  Description: Interventions:  - Monitor and assess patient's level of consciousness, motor function, sensory function, and level of assistance needed for ADLs  - Monitor and report changes from baseline  Collaborate with interdisciplinary team to initiate plan and implement interventions as ordered  - Provide and maintain a safe environment  - Consider seizure precautions  - Consider fall precautions  - Consider aspiration precautions  - Consider bleeding precautions  Outcome: Progressing     Problem: Activity Intolerance/Impaired Mobility  Goal: Mobility/activity is maintained at optimum level for patient  Description: Interventions:  - Assess and monitor patient  barriers to mobility and need for assistive/adaptive devices  - Assess patient's emotional response to limitations  - Collaborate with interdisciplinary team and initiate plans and interventions as ordered  - Encourage independent activity per ability   - Maintain proper body alignment  - Perform active/passive rom as tolerated/ordered  - Plan activities to conserve energy   - Turn patient as appropriate  Outcome: Progressing     Problem: Nutrition  Goal: Nutrition/Hydration status is improving  Description: Monitor and assess patient's nutrition/hydration status for malnutrition (ex- brittle hair, bruises, dry skin, pale skin and conjunctiva, muscle wasting, smooth red tongue, and disorientation)  Collaborate with interdisciplinary team and initiate plan and interventions as ordered  Monitor patient's weight and dietary intake as ordered or per policy  Utilize nutrition screening tool and intervene per policy  Determine patient's food preferences and provide high-protein, high-caloric foods as appropriate  - Assist patient with eating   - Allow adequate time for meals   - Encourage patient to take dietary supplement as ordered    - Collaborate with clinical nutritionist   - Include patient/family/caregiver in decisions related to nutrition  Outcome: Progressing     Problem: PAIN - ADULT  Goal: Verbalizes/displays adequate comfort level or baseline comfort level  Description: Interventions:  - Encourage patient to monitor pain and request assistance  - Assess pain using appropriate pain scale  - Administer analgesics based on type and severity of pain and evaluate response  - Implement non-pharmacological measures as appropriate and evaluate response  - Consider cultural and social influences on pain and pain management  - Notify physician/advanced practitioner if interventions unsuccessful or patient reports new pain  Outcome: Progressing     Problem: INFECTION - ADULT  Goal: Absence or prevention of progression during hospitalization  Description: INTERVENTIONS:  - Assess and monitor for signs and symptoms of infection  - Monitor lab/diagnostic results  - Monitor all insertion sites, i e  indwelling lines, tubes, and drains  - Monitor endotracheal if appropriate and nasal secretions for changes in amount and color  - [place] appropriate cooling/warming therapies per order  - Administer medications as ordered  - Instruct and encourage patient and family to use good hand hygiene technique  - Identify and instruct in appropriate isolation precautions for identified infection/condition  Outcome: Progressing  Goal: Absence of fever/infection during neutropenic period  Description: INTERVENTIONS:  - Monitor WBC    Outcome: Progressing     Problem: SAFETY ADULT  Goal: Patient will remain free of falls  Description: INTERVENTIONS:  - Assess patient frequently for physical needs  -  Identify cognitive and physical deficits and behaviors that affect risk of falls    -  [place] fall precautions as indicated by assessment   - Educate patient/family on patient safety including physical limitations  - Instruct patient to call for assistance with activity based on assessment  - Modify environment to reduce risk of injury  - Consider OT/PT consult to assist with strengthening/mobility  Outcome: Progressing  Goal: Maintain or return to baseline ADL function  Description: INTERVENTIONS:  -  Assess patient's ability to carry out ADLs; assess patient's baseline for ADL function and identify physical deficits which impact ability to perform ADLs (bathing, care of mouth/teeth, toileting, grooming, dressing, etc )  - Assess/evaluate cause of self-care deficits   - Assess range of motion  - Assess patient's mobility; develop plan if impaired  - Assess patient's need for assistive devices and provide as appropriate  - Encourage maximum independence but intervene and supervise when necessary  - Involve family in performance of ADLs  - Assess for home care needs following discharge   - Consider OT consult to assist with ADL evaluation and planning for discharge  - Provide patient education as appropriate  Outcome: Progressing  Goal: Maintain or return mobility status to optimal level  Description: INTERVENTIONS:  - Assess patient's baseline mobility status (ambulation, transfers, stairs, etc )    - Identify cognitive and physical deficits and behaviors that affect mobility  - Identify mobility aids required to assist with transfers and/or ambulation (gait belt, sit-to-stand, lift, walker, cane, etc )  - [place] fall precautions as indicated by assessment  - Record patient progress and toleration of activity level on Mobility SBAR; progress patient to next Phase/Stage  - Instruct patient to call for assistance with activity based on assessment  - Consider rehabilitation consult to assist with strengthening/weightbearing, etc   Outcome: Progressing     Problem: DISCHARGE PLANNING  Goal: Discharge to home or other facility with appropriate resources  Description: INTERVENTIONS:  - Identify barriers to discharge w/patient and caregiver  - Arrange for needed discharge resources and transportation as appropriate  - Identify discharge learning needs (meds, wound care, etc )  - Arrange for interpretive services to assist at discharge as needed  - Refer to Case Management Department for coordinating discharge planning if the patient needs post-hospital services based on physician/advanced practitioner order or complex needs related to functional status, cognitive ability, or social support system  Outcome: Progressing     Problem: Knowledge Deficit  Goal: Patient/family/caregiver demonstrates understanding of disease process, treatment plan, medications, and discharge instructions  Description: Complete learning assessment and assess knowledge base    Interventions:  - Provide teaching at level of understanding  - Provide teaching via preferred learning methods  Outcome: Progressing     Problem: NEUROSENSORY - ADULT  Goal: Achieves stable or improved neurological status  Description: INTERVENTIONS  - Monitor and report changes in neurological status  - Monitor vital signs such as temperature, blood pressure, glucose, and any other labs ordered   - Initiate measures to prevent increased intracranial pressure  - Monitor for seizure activity and implement precautions if appropriate      Outcome: Progressing  Goal: Remains free of injury related to seizures activity  Description: INTERVENTIONS  - Maintain airway, patient safety  and administer oxygen as ordered  - Monitor patient for seizure activity, document and report duration and description of seizure to physician/advanced practitioner  - If seizure occurs,  ensure patient safety during seizure  - Reorient patient post seizure  - Seizure pads on all 4 side rails  - Instruct patient/family to notify RN of any seizure activity including if an aura is experienced  - Instruct patient/family to call for assistance with activity based on nursing assessment  - Administer anti-seizure medications if ordered    Outcome: Progressing  Goal: Achieves maximal functionality and self care  Description: INTERVENTIONS  - Monitor swallowing and airway patency with patient fatigue and changes in neurological status  - Encourage and assist patient to increase activity and self care     - Encourage visually impaired, hearing impaired and aphasic patients to use assistive/communication devices  Outcome: Progressing     Problem: CARDIOVASCULAR - ADULT  Goal: Maintains optimal cardiac output and hemodynamic stability  Description: INTERVENTIONS:  - Monitor I/O, vital signs and rhythm  - Monitor for S/S and trends of decreased cardiac output  - Administer and titrate ordered vasoactive medications to optimize hemodynamic stability  - Assess quality of pulses, skin color and temperature  - Assess for signs of decreased coronary artery perfusion  - Instruct patient to report change in severity of symptoms  Outcome: Progressing  Goal: Absence of cardiac dysrhythmias or at baseline rhythm  Description: INTERVENTIONS:  - Continuous cardiac monitoring, vital signs, obtain 12 lead EKG if ordered  - Administer antiarrhythmic and heart rate control medications as ordered  - Monitor electrolytes and administer replacement therapy as ordered  Outcome: Progressing     Problem: RESPIRATORY - ADULT  Goal: Achieves optimal ventilation and oxygenation  Description: INTERVENTIONS:  - Assess for changes in respiratory status  - Assess for changes in mentation and behavior  - Position to facilitate oxygenation and minimize respiratory effort  - Oxygen administered by appropriate delivery if ordered  - Initiate smoking cessation education as indicated  - Encourage broncho-pulmonary hygiene including cough, deep breathe, Incentive Spirometry  - Assess the need for suctioning and aspirate as needed  - Assess and instruct to report SOB or any respiratory difficulty  - Respiratory Therapy support as indicated  Outcome: Progressing     Problem: GASTROINTESTINAL - ADULT  Goal: Minimal or absence of nausea and/or vomiting  Description: INTERVENTIONS:  - Administer IV fluids if ordered to ensure adequate hydration  - Maintain NPO status until nausea and vomiting are resolved  - Nasogastric tube if ordered  - Administer ordered antiemetic medications as needed  - Provide nonpharmacologic comfort measures as appropriate  - Advance diet as tolerated, if ordered  - Consider nutrition services referral to assist patient with adequate nutrition and appropriate food choices  Outcome: Progressing  Goal: Maintains or returns to baseline bowel function  Description: INTERVENTIONS:  - Assess bowel function  - Encourage oral fluids to ensure adequate hydration  - Administer IV fluids if ordered to ensure adequate hydration  - Administer ordered medications as needed  - Encourage mobilization and activity  - Consider nutritional services referral to assist patient with adequate nutrition and appropriate food choices  Outcome: Progressing  Goal: Maintains adequate nutritional intake  Description: INTERVENTIONS:  - Monitor percentage of each meal consumed  - Identify factors contributing to decreased intake, treat as appropriate  - Assist with meals as needed  - Monitor I&O, weight, and lab values if indicated  - Obtain nutrition services referral as needed  Outcome: Progressing  Goal: Establish and maintain optimal ostomy function  Description: INTERVENTIONS:  - Assess bowel function  - Encourage oral fluids to ensure adequate hydration  - Administer IV fluids if ordered to ensure adequate hydration   - Administer ordered medications as needed  - Encourage mobilization and activity  - Nutrition services referral to assist patient with appropriate food choices  - Assess stoma site  - Consider wound care consult   Outcome: Progressing     Problem: GENITOURINARY - ADULT  Goal: Maintains or returns to baseline urinary function  Description: INTERVENTIONS:  - Assess urinary function  - Encourage oral fluids to ensure adequate hydration if ordered  - Administer IV fluids as ordered to ensure adequate hydration  - Administer ordered medications as needed  - Offer frequent toileting  - Follow urinary retention protocol if ordered  Outcome: Progressing  Goal: Absence of urinary retention  Description: INTERVENTIONS:  - Assess patients ability to void and empty bladder  - Monitor I/O  - Bladder scan as needed  - Discuss with physician/AP medications to alleviate retention as needed  - Discuss catheterization for long term situations as appropriate  Outcome: Progressing     Problem: METABOLIC, FLUID AND ELECTROLYTES - ADULT  Goal: Electrolytes maintained within normal limits  Description: INTERVENTIONS:  - Monitor labs and assess patient for signs and symptoms of electrolyte imbalances  - Administer electrolyte replacement as ordered  - Monitor response to electrolyte replacements, including repeat lab results as appropriate  - Instruct patient on fluid and nutrition as appropriate  Outcome: Progressing  Goal: Fluid balance maintained  Description: INTERVENTIONS:  - Monitor labs   - Monitor I/O and WT  - Instruct patient on fluid and nutrition as appropriate  - Assess for signs & symptoms of volume excess or deficit  Outcome: Progressing  Goal: Glucose maintained within target range  Description: INTERVENTIONS:  - Monitor Blood Glucose as ordered  - Assess for signs and symptoms of hyperglycemia and hypoglycemia  - Administer ordered medications to maintain glucose within target range  - Assess nutritional intake and initiate nutrition service referral as needed  Outcome: Progressing     Problem: SKIN/TISSUE INTEGRITY - ADULT  Goal: Skin integrity remains intact  Description: INTERVENTIONS  - Identify patients at risk for skin breakdown  - Assess and monitor skin integrity  - Assess and monitor nutrition and hydration status  - Monitor labs (i e  albumin)  - Assess for incontinence   - Turn and reposition patient  - Assist with mobility/ambulation  - Relieve pressure over bony prominences  - Avoid friction and shearing  - Provide appropriate hygiene as needed including keeping skin clean and dry  - Evaluate need for skin moisturizer/barrier cream  - Collaborate with interdisciplinary team (i e  Nutrition, Rehabilitation, etc )   - Patient/family teaching  Outcome: Progressing  Goal: Incision(s), wounds(s) or drain site(s) healing without S/S of infection  Description: INTERVENTIONS  - Assess and document risk factors for skin impairment   - Assess and document dressing, incision, wound bed, drain sites and surrounding tissue  - Consider nutrition services referral as needed  - Oral mucous membranes remain intact  - Provide patient/ family education  Outcome: Progressing  Goal: Oral mucous membranes remain intact  Description: INTERVENTIONS  - Assess oral mucosa and hygiene practices  - Implement preventative oral hygiene regimen  - Implement oral medicated treatments as ordered  - Initiate Nutrition services referral as needed  Outcome: Progressing     Problem: HEMATOLOGIC - ADULT  Goal: Maintains hematologic stability  Description: INTERVENTIONS  - Assess for signs and symptoms of bleeding or hemorrhage  - Monitor labs  - Administer supportive blood products/factors as ordered and appropriate  Outcome: Progressing     Problem: MUSCULOSKELETAL - ADULT  Goal: Maintain or return mobility to safest level of function  Description: INTERVENTIONS:  - Assess patient's ability to carry out ADLs; assess patient's baseline for ADL function and identify physical deficits which impact ability to perform ADLs (bathing, care of mouth/teeth, toileting, grooming, dressing, etc )  - Assess/evaluate cause of self-care deficits   - Assess range of motion  - Assess patient's mobility  - Assess patient's need for assistive devices and provide as appropriate  - Encourage maximum independence but intervene and supervise when necessary  - Involve family in performance of ADLs  - Assess for home care needs following discharge   - Consider OT consult to assist with ADL evaluation and planning for discharge  - Provide patient education as appropriate  Outcome: Progressing  Goal: Maintain proper alignment of affected body part  Description: INTERVENTIONS:  - Support, maintain and protect limb and body alignment  - Provide patient/ family with appropriate education  Outcome: Progressing     Problem: Potential for Falls  Goal: Patient will remain free of falls  Description: INTERVENTIONS:  - Assess patient frequently for physical needs  -  Identify cognitive and physical deficits and behaviors that affect risk of falls  -  [place] fall precautions as indicated by assessment   - Educate patient/family on patient safety including physical limitations  - Instruct patient to call for assistance with activity based on assessment  - Modify environment to reduce risk of injury  - Consider OT/PT consult to assist with strengthening/mobility  Outcome: Progressing     Problem: Nutrition/Hydration-ADULT  Goal: Nutrient/Hydration intake appropriate for improving, restoring or maintaining nutritional needs  Description: Monitor and assess patient's nutrition/hydration status for malnutrition  Collaborate with interdisciplinary team and initiate plan and interventions as ordered  Monitor patient's weight and dietary intake as ordered or per policy  Utilize nutrition screening tool and intervene as necessary  Determine patient's food preferences and provide high-protein, high-caloric foods as appropriate       INTERVENTIONS:  - Monitor oral intake, urinary output, labs, and treatment plans  - Assess nutrition and hydration status and recommend course of action  - Evaluate amount of meals eaten  - Assist patient with eating if necessary   - Allow adequate time for meals  - Recommend/ encourage appropriate diets, oral nutritional supplements, and vitamin/mineral supplements  - Order, calculate, and assess calorie counts as needed  - Recommend, monitor, and adjust tube feedings and TPN/PPN based on assessed needs  - Assess need for intravenous fluids  - Provide specific nutrition/hydration education as appropriate  - Include patient/family/caregiver in decisions related to nutrition  Outcome: Progressing

## 2021-05-04 NOTE — ASSESSMENT & PLAN NOTE
Noted  · Hematology was consulted however I d/w them on 5/2, at this time the recommendations are for lifelong [facility], agents to be chosen by neurology  · Known to them as outpatient  · Thrombosis panel pending (5/3/21)

## 2021-05-05 ENCOUNTER — OFFICE VISIT (OUTPATIENT)
Dept: CARDIOLOGY CLINIC | Facility: CLINIC | Age: 61
End: 2021-05-05
Payer: COMMERCIAL

## 2021-05-05 VITALS
DIASTOLIC BLOOD PRESSURE: 78 MMHG | HEART RATE: 58 BPM | WEIGHT: 252 LBS | BODY MASS INDEX: 38.32 KG/M2 | SYSTOLIC BLOOD PRESSURE: 142 MMHG

## 2021-05-05 DIAGNOSIS — I10 BENIGN ESSENTIAL HTN: ICD-10-CM

## 2021-05-05 DIAGNOSIS — I48.0 PAF (PAROXYSMAL ATRIAL FIBRILLATION) (HCC): Primary | ICD-10-CM

## 2021-05-05 LAB
APTT SCREEN TO CONFIRM RATIO: 1.12 RATIO (ref 0–1.4)
CONFIRM APTT/NORMAL: 43.8 SEC (ref 0–55)
DRVVT IMM 1:2 NP PPP: 43 SEC (ref 0–40.4)
DRVVT SCREEN TO CONFIRM RATIO: 1.1 RATIO (ref 0.8–1.2)
F2 GENE MUT ANL BLD/T: NORMAL
LA PPP-IMP: ABNORMAL
SCREEN APTT: 39.4 SEC (ref 0–51.9)
SCREEN DRVVT: 50.6 SEC (ref 0–47)
THROMBIN TIME: 21.6 SEC (ref 0–23)

## 2021-05-05 PROCEDURE — 1111F DSCHRG MED/CURRENT MED MERGE: CPT | Performed by: INTERNAL MEDICINE

## 2021-05-05 PROCEDURE — 99214 OFFICE O/P EST MOD 30 MIN: CPT | Performed by: INTERNAL MEDICINE

## 2021-05-05 NOTE — PROGRESS NOTES
Cardiology   MD Tgire Majano MD Ather Mansoor, MD Tressa Bel, DO, Aditya Hopkins DO, Raymond Best DO, [facility] - [place]  -------------------------------------------------------------------  Replaced by [facility]  [address], [place] [address]  [place]  [address] [facility]                     1960                     [id number]          Assessment/Plan:     1  Benign essential hypertension  2  Bicuspid aortic valve with mild aortic valve stenosis  3  History of significant ascending aortic aneurysm status post replacement with matthew arch construction in 2014 (bicuspid valve not replaced at that time)  4  Paroxysmal atrial fibrillation--noted postOP 2014 without objective recurrence  5  Prior lacunar infarcts on MRI brain 1/2020 with recurrent stroke 03/2020 while on Xarelto, subsequently switched to Eliquis/aspirin  6  Hypercoagulable state with antiphospholipid antibody syndrome, positive lupus anticoagulant antibodies  7  Chronic atypical chest pain     · Loop recorder interrogations reviewed, PACs noted  Continue interrogations as scheduled  he has been changed from Eliquis to warfarin and aspirin after his recent CVA earlier this month  Continue atorvastatin for dyslipidemia and prior CVA  Routine lipid panel per PCP  Warfarin will be followed by PCP    · Recommend Lovenox bridging if he needs interruption of warfarin in light of multiple CVAs in the past  · Blood pressure acceptable post CVA, continue amlodipine, metoprolol controlled, continue metoprolol, amlodipine          Follow-up in 6 months          Interval History:      This is a very 60 YO male with a significant history of ascending aortic aneurysm, status post open replacement of the ascending aorta with matthew arch reconstruction on 11/06/2014   Preoperative cardiac catheterization was unremarkable   Postoperatively, he had transient atrial fibrillation, which converted to sinus rhythm on amiodarone/metoprolol, without any recurrence   Therefore he has not been maintained on anticoagulation while seeing Dr Sandra Pacheco in the past  [place] also has a history of bicuspid aortic valve without significant stenosis, hypertension      He was last hospitalized August 2017 for numbness on his face, left arm, and left leg, with MRI brain which was unremarkable for stroke, without any evidence of atrial fibrillation on telemetry   MRI cervical spine 09/2017 was unremarkable   Echocardiogram on 8/27/17 revealed a normal ejection fraction at 60% with no evidence of aortic stenosis and no regurgitation   The valve leaflets were not well visualized      Prior Holter monitor October 2017 revealed no evidence of atrial fibrillation, only occasional PACs and PVCs      During his prior visit 01/2019 he had complaints of atypical chest pain   Thereafter he underwent a nuclear stress test 02/2019 which was unremarkable   Echocardiogram revealed mild aortic stenosis      His losartan was discontinued 09/2019 secondary to angioedema   During his appointment with his PCP 12/26/2019, MRI of his head was recommended secondary to ongoing left arm weakness   MRI brain 01/02/2020 reported several subacute lacunar infarctions with microvascular ischemic disease which was stable   Subsequently, he was started on Xarelto by his PCP in light of his prior history of paroxysmal atrial fibrillation which was postoperative  He subsequently was hospitalized 03/2020 with recurrent stroke with CTA and MRI of the brain confirming a new CVA  He underwent loop recorder placement and was changed from Xarelto to Eliquis  Aspirin was added      He has been following with Hematology and has been diagnosed with antiphospholipid syndrome, with blood work 03/2020 confirming presence of lupus anticoagulant antibodies        He was hospitalized 05/01/2021 due to findings of CVA on outpatient MRI that was obtained visual field deficits  He underwent a NGUYỄN which was unremarkable, and was transition from Eliquis to warfarin and aspirin at discharge  His INR care was given to his PCP  He presents today for follow-up with no complaints since hospital discharge  He denies chest pain, shortness of breath, lower extremity edema               Vitals:  Vitals:    05/05/21 0858   BP: 142/78   BP Location: Right arm   Patient Position: Sitting   Cuff Size: Large   Pulse: 58   Weight: 114 kg (252 lb)         Past Medical History:   Diagnosis Date    Aneurysm of thoracic aorta (HCC)     last assessed 11/20/17    Anxiety     currently on no meds    Cardiac disease     Cholelithiasis     Chronic kidney disease     GERD (gastroesophageal reflux disease)     Headache due to anesthesia     Hyperlipidemia     Hypertension     Irritable bowel syndrome     Kidney stone     Palpitations     PONV (postoperative nausea and vomiting)     Shortness of breath     ? related to acid reflux    Sleep apnea     not sure    Stroke ([facility] [address] ) 11/2014    TIA (transient ischemic attack)      Social History     Socioeconomic History    Marital status:      Spouse name: Not on file    Number of children: Not on file    Years of education: Not on file    Highest education level: Not on file   Occupational History    Occupation: disabled     Social Needs    Financial resource strain: Not on file    Food insecurity     Worry: Not on file     Inability: Not on file   [employer] needs     Medical: Not on file     Non-medical: Not on file   Tobacco Use    Smoking status: Never Smoker    Smokeless tobacco: Never Used    Tobacco comment: no second hand smoke exposure   Substance and Sexual Activity    Alcohol use: Not Currently    Drug use: No    Sexual activity: Not on file   Lifestyle    Physical activity     Days per week: Not on file     Minutes per session: Not on file    Stress: Not on file Relationships    Social connections     Talks on phone: Not on file     Gets together: Not on file     Attends Baptism service: Not on file     Active member of club or organization: Not on file     Attends meetings of clubs or organizations: Not on file     Relationship status: Not on file    Intimate partner violence     Fear of current or ex partner: Not on file     Emotionally abused: Not on file     Physically abused: Not on file     Forced sexual activity: Not on file   Other Topics Concern    Not on file   Social History Narrative    Caffeine use    Completed some college     as per [employer]      Family History   Problem Relation Age of Onset    Diverticulitis Mother         of colon    Nephrolithiasis Mother     Emphysema Father     Nephrolithiasis Sister     Heart attack Maternal Grandfather 72    Breast cancer Paternal Grandmother     Lung cancer Paternal Grandfather     Cancer Family     Coronary artery disease Family     Diabetes Family         sibling    Hypertension Family         sibling    Hernia Family         sibling     Past Surgical History:   Procedure Laterality Date    AORTA SURGERY      thoracic - aneurysmorrhaphy    APPENDECTOMY      ASCENDING AORTIC ANEURYSM REPAIR      resolved 8/19/15    CHOLECYSTECTOMY      laparoscopic    CYSTOSCOPY      with removal of object- stent removal    KNEE ARTHROSCOPY Right 1996    with medial meniscus repair    LITHOTRIPSY      renal    AK FRAGMENT KIDNEY STONE/ ESWL Left 5/17/2018    Procedure: [address] ([facility]);   Surgeon: Renée Parmar MD;  Location: [facility]  MAIN OR;  Service: Urology    THUMB ARTHROSCOPY Right 1976    ligament repair       Current Outpatient Medications:     amLODIPine (NORVASC) 5 mg tablet, Take 1 tablet (5 mg total) by mouth daily, Disp: 90 tablet, Rfl: 1    aspirin (ECOTRIN LOW STRENGTH) 81 mg EC tablet, Take 1 tablet (81 mg total) by mouth daily, Disp: 30 tablet, Rfl: 0    atorvastatin (LIPITOR) 40 mg tablet, TAKE 1 TABLET BY MOUTH DAILY WITH DINNER, Disp: 90 tablet, Rfl: 1    metoprolol succinate (TOPROL-XL) 50 mg 24 hr tablet, TAKE 3 TABLETS BY MOUTH EVERY DAY, Disp: 270 tablet, Rfl: 1    pantoprazole (PROTONIX) 40 mg tablet, TAKE 1 TABLET BY MOUTH EVERY DAY, Disp: 90 tablet, Rfl: 1    warfarin (COUMADIN) 5 mg tablet, Take 1 tablet (5 mg total) by mouth daily, Disp: 30 tablet, Rfl: 0    cholecalciferol (VITAMIN D3) 1,000 units tablet, Take 1 tablet (1,000 Units total) by mouth daily, Disp: 30 tablet, Rfl: 5  No current facility-administered medications for this visit  Review of Systems:  Review of Systems   Respiratory: Negative  Cardiovascular: Negative  All other systems reviewed and are negative  Physical Exam:  Physical Exam  Constitutional:       General: He is not in acute distress  Appearance: He is well-developed  He is not diaphoretic  HENT:      Head: Normocephalic and atraumatic  Eyes:      General: No scleral icterus  Right eye: No discharge  Pupils: Pupils are equal, round, and reactive to light  Neck:      Musculoskeletal: Normal range of motion and neck supple  Thyroid: No thyromegaly  Cardiovascular:      Rate and Rhythm: Normal rate and regular rhythm  Heart sounds: Normal heart sounds  No murmur  No friction rub  No gallop  Pulmonary:      Effort: Pulmonary effort is normal       Breath sounds: Normal breath sounds  Abdominal:      General: There is no distension  Tenderness: There is no abdominal tenderness  There is no guarding or rebound  Musculoskeletal: Normal range of motion  Skin:     General: Skin is warm and dry  Coloration: Skin is not pale  Findings: No erythema or rash  Neurological:      Mental Status: He is alert and oriented to person, place, and time        Coordination: Coordination normal    Psychiatric:         Behavior: Behavior normal          Thought Content: Thought content normal          Judgment: Judgment normal          This note was completed in part utilizing M-Modal Fluency Direct Software  Grammatical errors, random word insertions, spelling mistakes, and incomplete sentences can be an occasional consequence of this system secondary to software limitations, ambient noise, and hardware issues  If you have any questions or concerns about the content, text, or information contained within the body of this dictation, please contact the provider for clarification

## 2021-05-06 ENCOUNTER — APPOINTMENT (OUTPATIENT)
Dept: LAB | Facility: HOSPITAL | Age: 61
End: 2021-05-06
Payer: COMMERCIAL

## 2021-05-06 ENCOUNTER — OFFICE VISIT (OUTPATIENT)
Dept: FAMILY MEDICINE CLINIC | Facility: CLINIC | Age: 61
End: 2021-05-06
Payer: COMMERCIAL

## 2021-05-06 VITALS
SYSTOLIC BLOOD PRESSURE: 126 MMHG | HEIGHT: 68 IN | WEIGHT: 257.8 LBS | TEMPERATURE: 97.5 F | DIASTOLIC BLOOD PRESSURE: 78 MMHG | HEART RATE: 60 BPM | BODY MASS INDEX: 39.07 KG/M2

## 2021-05-06 DIAGNOSIS — D68.61 ANTIPHOSPHOLIPID ANTIBODY WITH HYPERCOAGULABLE STATE (HCC): ICD-10-CM

## 2021-05-06 DIAGNOSIS — I63.9 ACUTE CVA (CEREBROVASCULAR ACCIDENT) (HCC): Primary | ICD-10-CM

## 2021-05-06 DIAGNOSIS — H53.9 VISION CHANGES: ICD-10-CM

## 2021-05-06 DIAGNOSIS — E78.2 MIXED HYPERLIPIDEMIA: Chronic | ICD-10-CM

## 2021-05-06 DIAGNOSIS — E55.9 VITAMIN D DEFICIENCY: ICD-10-CM

## 2021-05-06 DIAGNOSIS — I48.0 PAROXYSMAL ATRIAL FIBRILLATION (HCC): ICD-10-CM

## 2021-05-06 DIAGNOSIS — I63.9 STROKE (HCC): ICD-10-CM

## 2021-05-06 DIAGNOSIS — I10 ESSENTIAL HYPERTENSION: ICD-10-CM

## 2021-05-06 DIAGNOSIS — Z79.01 ANTICOAGULATED ON COUMADIN: ICD-10-CM

## 2021-05-06 LAB
F5 GENE MUT ANL BLD/T: NORMAL
INR PPP: 1.54 (ref 0.84–1.19)
PROTHROMBIN TIME: 18.2 SECONDS (ref 11.6–14.5)

## 2021-05-06 PROCEDURE — 99496 TRANSJ CARE MGMT HIGH F2F 7D: CPT | Performed by: FAMILY MEDICINE

## 2021-05-06 PROCEDURE — 85610 PROTHROMBIN TIME: CPT

## 2021-05-06 PROCEDURE — 1111F DSCHRG MED/CURRENT MED MERGE: CPT | Performed by: FAMILY MEDICINE

## 2021-05-06 PROCEDURE — 36415 COLL VENOUS BLD VENIPUNCTURE: CPT

## 2021-05-06 NOTE — ASSESSMENT & PLAN NOTE
Patient did have acute CVA, which appeared embolic based on the hospital discharge  At this point, the patient is on warfarin in place of Eliquis  Patient will follow-up with Hematology-Oncology at some point, neurology at some point  Currently, appears to be doing quite well  Continue on warfarin  We will manage that with him

## 2021-05-06 NOTE — ASSESSMENT & PLAN NOTE
Patient is currently on atorvastatin for cholesterol  Using 40 mg  Neurology recommended continue  Patient wondered if this was the cause for his myalgias in the lower extremities, as well as the weakness  Reviewed with him that this is a likely cause  Unfortunately, with the CVA, it is still going to be recommended to be on statins  Would recommend that we wait at least 1 month, and then consider options to further evaluate this

## 2021-05-06 NOTE — PROGRESS NOTES
Assessment/Plan:     Acute CVA (cerebrovascular accident) [facility])  Patient did have acute CVA, which appeared embolic based on the hospital discharge  At this point, the patient is on warfarin in place of Eliquis  Patient will follow-up with Hematology-Oncology at some point, neurology at some point  Currently, appears to be doing quite well  Continue on warfarin  We will manage that with him  Antiphospholipid antibody with hypercoagulable state [facility])  Patient to follow-up with Hematology/Oncology  Has been there previously  Currently on warfarin  Failed Eliquis, Xarelto  Atrial fibrillation ([facility] [address] )  Patient is on metoprolol for rate control, and warfarin for anticoagulation  Would recommend that he continue on these  Patient does have an implanted loop recorder as well  Hypertension  Blood pressure in the office is at goal for recommendations from hospital   Continue current treatment, metoprolol, amlodipine  Hyperlipidemia  Patient is currently on atorvastatin for cholesterol  Using 40 mg  Neurology recommended continue  Patient wondered if this was the cause for his myalgias in the lower extremities, as well as the weakness  Reviewed with him that this is a likely cause  Unfortunately, with the CVA, it is still going to be recommended to be on statins  Would recommend that we wait at least 1 month, and then consider options to further evaluate this  Vision changes  Patient still having some central vision changes  Would recommend that he follow-up with Ophthalmology  He did mention that it gotten somewhat better  Would question if this is related to retinal CVA  Vitamin D deficiency  Patient can restart vitamin-D after his hospitalization  Anticoagulated on Coumadin  Patient is now anticoagulated on Coumadin  Will continue to follow-up PT INR to ensure that INR is between 2 and 3  Patient is on warfarin 5 mg  On discharge, his INR was 1 1, it is now 1 54    Based on his INR from this morning of 5 4, I would recommend that he take warfarin 5 mg daily (continue current dosing), and check INR on Monday  Diagnoses and all orders for this visit:    [facility]    Antiphospholipid antibody with hypercoagulable state ([facility] )    Paroxysmal atrial fibrillation ([facility] )    Essential hypertension    Mixed hyperlipidemia    Vision changes    Vitamin D deficiency    Anticoagulated on Coumadin         Subjective:     Patient ID: Kelsey Carver is a 64 y o  male  Patient is here to follow-up after recent hospitalization  Unfortunately, he has had another stroke  The imaging showed subacute and acute stroke  It appears to be embolic in nature  This does mean that he failed with Eliquis  He is now on Coumadin per Neurology in the hospital     The whole episode became more problematic after patient had an MRI from Ophthalmology due to vision changes  The strokes were noted at MRI, he was transferred immediately to the emergency room  Today, the patient reports that he feels okay  Some leg weakness  Vision: Has not changed  He does have follow-up with Ophthalmology  During his hospital stay, the patient did have a transesophageal echo, and it was normal     In the hospital, they were concerned about blood pressure  They did list blood pressure goal   This was on discharge summary  Sleep apnea:  Patient did have a sleep study previously  He did not follow-up afterwards  Patient is concerned about using CPAP, as he mentioned that he does toss and turn a fair amount overnight  I did review CPAP with him as well  Atrial fibrillation:  Patient is not having any palpitations at the moment  We did review atrial fibrillation in general     Anti phospholipid antibody syndrome:  Patient is following with Hematology/Oncology  Currently on a Coumadin  Hyperlipidemia:  Patient will be due for labs in the future            Review of Systems Constitutional: Negative  HENT: Negative  Eyes: Negative  Respiratory: Negative  Cardiovascular: Negative  Gastrointestinal: Negative  Endocrine: Negative  Genitourinary: Negative  Musculoskeletal: Negative  Skin: Negative  Allergic/Immunologic: Negative  Neurological: Negative  Hematological: Negative  Psychiatric/Behavioral: Negative  Objective:     Physical Exam  Vitals signs and nursing note reviewed  Constitutional:       General: He is not in acute distress  Appearance: He is well-developed  He is not diaphoretic  HENT:      Head: Normocephalic and atraumatic  Right Ear: Hearing, tympanic membrane, ear canal and external ear normal       Left Ear: Hearing, tympanic membrane, ear canal and external ear normal       Nose: Nose normal       Right Sinus: No maxillary sinus tenderness or frontal sinus tenderness  Left Sinus: No maxillary sinus tenderness or frontal sinus tenderness  Mouth/Throat:      Pharynx: Uvula midline  No oropharyngeal exudate  Eyes:      General: Lids are everted, no foreign bodies appreciated  Conjunctiva/sclera: Conjunctivae normal       Pupils: Pupils are equal, round, and reactive to light  Neck:      Musculoskeletal: Normal range of motion and neck supple  Thyroid: No thyromegaly  Vascular: No carotid bruit  Trachea: No tracheal deviation  Cardiovascular:      Rate and Rhythm: Normal rate and regular rhythm  Pulses:           Carotid pulses are 2+ on the right side and 2+ on the left side  Heart sounds: Normal heart sounds  No murmur  No friction rub  No gallop  Pulmonary:      Effort: Pulmonary effort is normal  No respiratory distress  Breath sounds: Normal breath sounds  No wheezing or rales  Abdominal:      General: Bowel sounds are normal  There is no distension  Palpations: Abdomen is soft  Tenderness: There is no abdominal tenderness     Lymphadenopathy: Cervical: No cervical adenopathy  Skin:     General: Skin is warm and dry  Neurological:      Mental Status: He is alert and oriented to person, place, and time  Coordination: Coordination normal    Psychiatric:         Behavior: Behavior normal          Thought Content: Thought content normal          Judgment: Judgment normal            Vitals:    05/06/21 1026   BP: 126/78   Pulse: 60   Temp: 97 5 °F (36 4 °C)   Weight: 117 kg (257 lb 12 8 oz)   Height: 5' 8" (1 727 m)       Transitional Care Management Review:  Johnny Tubbs is a 64 y o  male here for TCM follow up  During the TCM phone call patient stated:    TCM Call (since 4/5/2021)     Date and time call was made  5/4/2021  3:20 PM    Hospital care reviewed  Records reviewed    Patient was hospitial[facility] at  [facility]        Date of Admission  05/01/21    Date of discharge  05/04/21    Diagnosis  Stroke    Disposition  Home    Were the patients medications reviewed and updated  No    Current Symptoms  None      TCM Call (since 4/5/2021)     Post hospital issues  None    Should patient be enrolled in anticoag monitoring? Yes    Scheduled for follow up?   Yes    Did you obtain your prescribed medications  Yes    Do you need help managing your prescriptions or medications  No    Is transportation to your appointment needed  No    I have advised the patient to call PCP with any new or worsening symptoms  Hellen Camacho, Practice Administrator           Alaina Villa MD

## 2021-05-06 NOTE — ASSESSMENT & PLAN NOTE
Blood pressure in the office is at goal for recommendations from hospital   Continue current treatment, metoprolol, amlodipine

## 2021-05-06 NOTE — PATIENT INSTRUCTIONS
Vitamin K in Foods   WHAT YOU NEED TO KNOW:   What do I need to know about warfarin and vitamin K?   · Warfarin is a type of medicine called a blood thinner  It makes your blood clot more slowly  This can help prevent dangerous problems, such as a stroke (a blood clot in the brain)  Vitamin K helps your blood to clot (thicken to stop bleeding)  Warfarin works by making it harder for your body to use vitamin K to clot blood  Changes in the amount of vitamin K that you normally eat can affect how warfarin works  · Your healthcare provider can tell how well warfarin is working from a blood test that you will have regularly  This test is called an international normalized ratio (INR)  It shows how quickly your blood clots  To keep your INR at a healthy level, you need to manage how much vitamin K you eat  How much vitamin K should I eat while I take warfarin? Eat the same amount of vitamin K each day  Do not change the amount of vitamin K you normally have from foods or supplements  This helps keep your INR at the same healthy level  · A big increase in vitamin K can lower your INR  This can cause dangerous clotting in your blood  · A big decrease in vitamin K can raise your INR  This can make it harder for your blood to clot  It can cause you to bleed too much  Do not avoid foods that contain vitamin K  Which foods have vitamin K? Dark green leafy vegetables have the highest amounts of vitamin K  Foods that contain vitamin K include the following:  · Foods with more than 100 mcg per serving:      ? ½ cup of cooked kale (531 mcg)    ? ½ cup of cooked spinach (444 mcg)    ? ½ cup of cooked clifford greens (418 mcg)    ? 1 cup of cooked broccoli (220 mcg)    ? 1 cup of cooked brussels sprouts (219 mcg)    ? 1 cup of raw clifford greens (184 mcg)    ? 1 cup of raw spinach (145 mcg)    ? 1 cup of raw endive (116 mcg)    · Foods with 50 to 100 mcg per serving:      ? 1 cup of raw broccoli (89 mcg)    ?  ½ cup of cooked cabbage (82 mcg)    ? 1 cup of green leaf lettuce (71 mcg)    ? 1 cup of colette lettuce (57 mcg)    · Foods with 15 to 50 mcg per serving:      ? 4 connors of asparagus (48 mcg)    ? 1 medium kiwi fruit (31 mcg)    ? 1 cup of raw blackberries or blueberries (29 mcg)    ? 1 cup of red or green grapes (23 mcg)    ? ½ cup of cooked peas (19 mcg)       What are other sources of vitamin K? Multivitamins and other supplements may contain 10 to 80 mcg of vitamin K  These amounts can cause changes in your INR  Read the labels of any supplements you take  Do not take more than 1 supplement that contains vitamin K  Talk to your healthcare provider about the supplements you are taking  When should I contact my healthcare provider? · You have a poor appetite  · You have an upset stomach  · You have hair loss  · You bruise more easily than normal      · You have questions about your medicines, supplements, or the amount of vitamin K you eat  When should I seek immediate care? · You cough up blood  · You have red or black bowel movements  · Your urine is red or dark brown  · You have bleeding from your gums or nose  · You have heavy bleeding from a wound that does not stop, or unusually heavy monthly periods  · You have a severe headache  CARE AGREEMENT:   You have the right to help plan your care  Learn about your health condition and how it may be treated  Discuss treatment options with your healthcare providers to decide what care you want to receive  You always have the right to refuse treatment  The above information is an  only  It is not intended as medical advice for individual conditions or treatments  Talk to your doctor, nurse or pharmacist before following any medical regimen to see if it is safe and effective for you    © Copyright [address] Information is for End User's use only and may not be sold, redistributed or otherwise used for commercial purposes  All illustrations and images included in CareNotes® are the copyrighted property of A D A M , Inc  or [facility]     Problem List Items Addressed This Visit     Acute CVA (cerebrovascular accident) [facility]) - Primary     Patient did have acute CVA, which appeared embolic based on the hospital discharge  At this point, the patient is on warfarin in place of Eliquis  Patient will follow-up with Hematology-Oncology at some point, neurology at some point  Currently, appears to be doing quite well  Continue on warfarin  We will manage that with him  Anticoagulated on Coumadin     Patient is now anticoagulated on Coumadin  Will continue to follow-up PT INR to ensure that INR is between 2 and 3  Patient is on warfarin 5 mg  On discharge, his INR was 1 1, it is now 1 54  Based on his INR from this morning of 5 4, I would recommend that he take warfarin 5 mg daily (continue current dosing), and check INR on Monday  Antiphospholipid antibody with hypercoagulable state [facility])     Patient to follow-up with Hematology/Oncology  Has been there previously  Currently on warfarin  Failed Eliquis, Xarelto  Atrial fibrillation ([facility] [address] )     Patient is on metoprolol for rate control, and warfarin for anticoagulation  Would recommend that he continue on these  Patient does have an implanted loop recorder as well  Hyperlipidemia (Chronic)     Patient is currently on atorvastatin for cholesterol  Using 40 mg  Neurology recommended continue  Patient wondered if this was the cause for his myalgias in the lower extremities, as well as the weakness  Reviewed with him that this is a likely cause  Unfortunately, with the CVA, it is still going to be recommended to be on statins  Would recommend that we wait at least 1 month, and then consider options to further evaluate this           Hypertension     Blood pressure in the office is at goal for recommendations from hospital   Continue current treatment, metoprolol, amlodipine  Vision changes     Patient still having some central vision changes  Would recommend that he follow-up with Ophthalmology  He did mention that it gotten somewhat better  Would question if this is related to retinal CVA  Vitamin D deficiency     Patient can restart vitamin-D after his hospitalization  COVID 19 Instructions    [place] Miranda was advised to limit contact with others to essential tasks such as getting food, medications, and medical care  Proper handwashing reviewed, and Hand sanitzer when washing is not available  If the patient develops symptoms of COVID 19, the patient should call the office as soon as possible  For 6070-3797 Flu season, it is strongly recommended that Flu Vaccinations be obtained  Virtual Visits may be conducted in several ways: AmWell: You should get a text message when the provider is ready to see you  Click on the link in the text message, and the call should start  You will need to type in your name, and allow camera and microphone access  This is HIPPA compliant, and secure  Please try to download Google Duo  Once you do download this on your phone, you will be prompted to add your phone number to the account  After that, he should receive a text from [place], and use that code to verify your phone number  After that, you should be able to use Google Duo to receive and make video calls  Please download Microsoft Teams to your phone or computer  You would get an email with the meeting after scheduling with the office  You will Join the meeting, and wait there till the provider joins as well  Instructions for downloading this are available from the office  This is HIPPA compliant, and secure      We are committed to getting you vaccinated as soon as possible and will be closely following CDC and [facility]  guidelines as they are released and revised  Please refer to our COVID-19 vaccine webpage for the most up to date information on the vaccine and our distribution efforts  Jose gallegos    A-fib (Atrial Fibrillation)   AMBULATORY CARE:   Atrial fibrillation (A-fib)  is an irregular heartbeat  It reduces your heart's ability to pump blood through your body  A-fib may come and go, or it may be a long-term condition  A-fib can cause life-threatening blood clots, stroke, or heart failure  It is important to treat and manage A-fib to help prevent these problems  Common signs and symptoms include the following:   · A heartbeat that races, pounds, or flutters    · Weakness, severe tiredness, or confusion    · Feeling lightheaded, sweaty, dizzy, or faint    · Shortness of breath or anxiety    · Chest pain or pressure    Call your local emergency number (911 in the [address] [facility] [address]) or have someone call if:   · You have any of the following signs of a heart attack:      ? Squeezing, pressure, or pain in your chest    ? You may  also have any of the following:     § Discomfort or pain in your back, neck, jaw, stomach, or arm    § Shortness of breath    § Nausea or vomiting    § Lightheadedness or a sudden cold sweat    · You have any of the following signs of a stroke:      ? Numbness or drooping on one side of your face     ? Weakness in an arm or leg    ? Confusion or difficulty speaking    ? Dizziness, a severe headache, or vision loss    Call your doctor or cardiologist if:   · Your arm or leg feels warm, tender, and painful  It may look swollen and red  · Your heart rate is more than 110 beats per minute  · You have new or worsening swelling in your legs, feet, ankles, or abdomen  · You are short of breath, even at rest     · You have questions or concerns about your condition or care  Treatment for A-fib:  Conditions that cause A-fib, such as thyroid disease, will be treated   You may also need any of the following:  · Heart medicines  help control your heart rate or rhythm  You may need more than one medicine to treat your symptoms  · Antiplatelet or blood thinner medicines  help prevent blood clots and stroke  · Cardioversion  is a procedure to return your heart rate and rhythm to normal  It can be done using medicines or electric shock  · A-fib ablation  is a procedure that uses energy to burn a small area of heart tissue  This creates scar tissue and prevents electrical signals that cause A-fib  You may need this procedure more than once  Ask for more information on A-fib ablation  · A pacemaker  may be inserted into your heart  A pacemaker is a device that controls your heartbeat  A pacemaker may be inserted during an ablation procedure or surgery  Ask your healthcare provider for more information on pacemakers  · Surgery  may be needed if other procedures do not work  During surgery your healthcare provider will make cuts in the upper part of your heart  The provider will stitch the cuts together to create scar tissue  The scar tissue will prevent electrical signals that cause A-fib  Manage A-fib:   · Know your target heart rate  Learn how to check your pulse and monitor your heart rate  · Know the risks if you choose to drink alcohol  Alcohol can increase your risk for A-fib or make A-fib harder to manage  Ask your healthcare provider if it is okay for you to drink any alcohol  He or she can help you set limits for the number of drinks you have in 24 hours and in a week  A drink of alcohol is 12 ounces of beer, 5 ounces of wine, or 1½ ounces of liquor  · Do not smoke  Nicotine can cause heart damage and make it more difficult to manage your A-fib  Do not use e-cigarettes or smokeless tobacco in place of cigarettes or to help you quit  They still contain nicotine  Ask your healthcare provider for information if you currently smoke and need help quitting      · Eat heart-healthy foods  Heart healthy foods will help keep your cholesterol low  These include fruits, vegetables, whole-grain breads, low-fat dairy products, beans, lean meats, and fish  Replace butter and margarine with heart-healthy oils such as olive oil and canola oil  · Maintain a healthy weight  Ask your healthcare provider what a healthy weight is for you  Ask him or her to help you create a safe weight loss plan if you are overweight  Even a small goal of a 10% weight loss can improve your heart health  · Get regular physical activity  Physical activity helps improve your heart health  Get at least 150 minutes of moderate aerobic physical activity each week  Your healthcare provider can help you create an activity plan  · Manage other health conditions  This includes high blood pressure or cholesterol, sleep apnea, diabetes, and other heart conditions  Take medicine as directed and follow your treatment plan  Your healthcare provider may need to change a medicine you are taking if it is causing your A-fib  Do not  stop taking any medicine unless directed by your provider  Follow up with your doctor or cardiologist as directed: You will need regular blood tests and monitoring  Write down your questions so you remember to ask them during your visits  © Copyright [employer] 2021 Information is for End User's use only and may not be sold, redistributed or otherwise used for commercial purposes  All illustrations and images included in CareNotes® are the copyrighted property of [employer]  or [facility]me[facility]   The above information is an  only  It is not intended as medical advice for individual conditions or treatments  Talk to your doctor, nurse or pharmacist before following any medical regimen to see if it is safe and effective for you

## 2021-05-06 NOTE — ASSESSMENT & PLAN NOTE
Patient still having some central vision changes  Would recommend that he follow-up with Ophthalmology  He did mention that it gotten somewhat better  Would question if this is related to retinal CVA

## 2021-05-06 NOTE — ASSESSMENT & PLAN NOTE
Patient to follow-up with Hematology/Oncology  Has been there previously  Currently on warfarin  Failed Eliquis, Xarelto

## 2021-05-06 NOTE — ASSESSMENT & PLAN NOTE
Patient is on metoprolol for rate control, and warfarin for anticoagulation  Would recommend that he continue on these  Patient does have an implanted loop recorder as well

## 2021-05-06 NOTE — ASSESSMENT & PLAN NOTE
Patient is now anticoagulated on Coumadin  Will continue to follow-up PT INR to ensure that INR is between 2 and 3  Patient is on warfarin 5 mg  On discharge, his INR was 1 1, it is now 1 54  Based on his INR from this morning of 5 4, I would recommend that he take warfarin 5 mg daily (continue current dosing), and check INR on Monday

## 2021-05-10 ENCOUNTER — LAB (OUTPATIENT)
Dept: LAB | Facility: HOSPITAL | Age: 61
End: 2021-05-10
Payer: COMMERCIAL

## 2021-05-10 ENCOUNTER — ANTICOAG VISIT (OUTPATIENT)
Dept: FAMILY MEDICINE CLINIC | Facility: CLINIC | Age: 61
End: 2021-05-10

## 2021-05-10 ENCOUNTER — TRANSCRIBE ORDERS (OUTPATIENT)
Dept: ADMINISTRATIVE | Facility: HOSPITAL | Age: 61
End: 2021-05-10

## 2021-05-10 DIAGNOSIS — Z79.01 ANTICOAGULATED ON COUMADIN: Primary | ICD-10-CM

## 2021-05-10 DIAGNOSIS — Z79.01 ANTICOAGULATED ON COUMADIN: ICD-10-CM

## 2021-05-10 LAB
INR PPP: 2.24 (ref 0.84–1.19)
INR PPP: 2.24 (ref 0.84–1.19)
PROTHROMBIN TIME: 24.3 SECONDS (ref 11.6–14.5)

## 2021-05-10 PROCEDURE — 85610 PROTHROMBIN TIME: CPT

## 2021-05-10 PROCEDURE — 36415 COLL VENOUS BLD VENIPUNCTURE: CPT

## 2021-05-11 ENCOUNTER — TELEPHONE (OUTPATIENT)
Dept: NEUROLOGY | Facility: CLINIC | Age: 61
End: 2021-05-11

## 2021-05-11 NOTE — TELEPHONE ENCOUNTER
Post CVA Discharge Follow Up    Hospitalization: 5/1/2021 - 5/4/2021  The purpose of this phone call is to assess patient's general wellbeing or for any assistance needed with follow-up care  Called [phone] with no answer  Left message on voicemail box for call back  Called [phone], reached patient, I introduced myself and explained neurovascular nurse navigator role  Since discharge, he denies experiencing any new or worsening stroke-like symptoms  States he gets tingling on the top of his scalp at times, but not constant  Also claims he has bilateral arm tingling at times  Ambulation / ADLs:  he is ambulating independently as well as preforming his own ADLs  Patient manages his own medications, appointments, and affairs  Appointments / Medication Review:  Reviewed appointments - patient successfully followed up with PCP 5/6  Patient scheduled for the following: MRI of the lumbar spine 5/14  Offered to schedule stroke hospital follow up appt but the patient politely declined at this time  States he will call back after his MRI  I reviewed medications with him  There have not been any medication changes since discharge from the hospital  Reports having no difficulties obtaining medications  Reports he is taking all as prescribed with no missed doses, medication side effects, or signs of bleeding  Risk Factors / Education:  During this call stroke type and personal risk factors and management  Patient verbalizes understanding but would benefit from reeducation  As for risk factors, patient reports he does not monitor his BP  He is trying to avoid "junk foods" and is a non smoker  I addressed all his questions  At the conclusion of the conversation, patient denies having any further questions or concerns

## 2021-05-14 ENCOUNTER — HOSPITAL ENCOUNTER (OUTPATIENT)
Dept: MRI IMAGING | Facility: HOSPITAL | Age: 61
Discharge: HOME | End: 2021-05-14
Payer: COMMERCIAL

## 2021-05-14 DIAGNOSIS — R10.9 ABDOMINAL PAIN: ICD-10-CM

## 2021-05-14 DIAGNOSIS — M54.9 BACK PAIN: ICD-10-CM

## 2021-05-14 DIAGNOSIS — Z86.73 HISTORY OF STROKE: ICD-10-CM

## 2021-05-14 PROCEDURE — G1004 CDSM NDSC: HCPCS

## 2021-05-14 PROCEDURE — 72158 MRI LUMBAR SPINE W/O & W/DYE: CPT

## 2021-05-14 PROCEDURE — A9585 GADOBUTROL INJECTION: HCPCS | Performed by: PSYCHIATRY & NEUROLOGY

## 2021-05-14 RX ADMIN — GADOBUTROL 11 ML: 604.72 INJECTION INTRAVENOUS at 13:02

## 2021-05-17 ENCOUNTER — TRANSCRIBE ORDERS (OUTPATIENT)
Dept: ADMINISTRATIVE | Facility: HOSPITAL | Age: 61
End: 2021-05-17

## 2021-05-17 ENCOUNTER — ANTICOAG VISIT (OUTPATIENT)
Dept: FAMILY MEDICINE CLINIC | Facility: CLINIC | Age: 61
End: 2021-05-17

## 2021-05-17 ENCOUNTER — APPOINTMENT (OUTPATIENT)
Dept: LAB | Facility: HOSPITAL | Age: 61
End: 2021-05-17
Payer: COMMERCIAL

## 2021-05-17 DIAGNOSIS — I63.9 ACUTE CVA (CEREBROVASCULAR ACCIDENT) (HCC): ICD-10-CM

## 2021-05-17 DIAGNOSIS — I48.0 PAROXYSMAL ATRIAL FIBRILLATION (HCC): Primary | ICD-10-CM

## 2021-05-17 DIAGNOSIS — Z79.01 ANTICOAGULATED ON COUMADIN: ICD-10-CM

## 2021-05-17 LAB
INR PPP: 2.69 (ref 0.84–1.19)
PROTHROMBIN TIME: 28 SECONDS (ref 11.6–14.5)

## 2021-05-17 PROCEDURE — 36415 COLL VENOUS BLD VENIPUNCTURE: CPT

## 2021-05-17 PROCEDURE — 85610 PROTHROMBIN TIME: CPT

## 2021-05-20 DIAGNOSIS — I10 ESSENTIAL HYPERTENSION: Chronic | ICD-10-CM

## 2021-05-20 RX ORDER — AMLODIPINE BESYLATE 5 MG/1
TABLET ORAL
Qty: 90 TABLET | Refills: 1 | Status: SHIPPED | OUTPATIENT
Start: 2021-05-20 | End: 2022-01-06

## 2021-05-21 ENCOUNTER — HOSPITAL ENCOUNTER (OUTPATIENT)
Facility: HOSPITAL | Age: 61
Setting detail: OBSERVATION
Discharge: HOME | End: 2021-05-21
Attending: EMERGENCY MEDICINE | Admitting: INTERNAL MEDICINE
Payer: COMMERCIAL

## 2021-05-21 ENCOUNTER — APPOINTMENT (EMERGENCY)
Dept: RADIOLOGY | Facility: HOSPITAL | Age: 61
End: 2021-05-21
Payer: COMMERCIAL

## 2021-05-21 VITALS
HEART RATE: 47 BPM | OXYGEN SATURATION: 96 % | RESPIRATION RATE: 18 BRPM | DIASTOLIC BLOOD PRESSURE: 64 MMHG | BODY MASS INDEX: 39.55 KG/M2 | TEMPERATURE: 98.2 F | SYSTOLIC BLOOD PRESSURE: 120 MMHG | WEIGHT: 260.14 LBS

## 2021-05-21 DIAGNOSIS — Z98.890 H/O AORTIC ANEURYSM REPAIR: Chronic | ICD-10-CM

## 2021-05-21 DIAGNOSIS — I48.0 PAROXYSMAL ATRIAL FIBRILLATION (HCC): ICD-10-CM

## 2021-05-21 DIAGNOSIS — K21.9 GASTROESOPHAGEAL REFLUX DISEASE WITHOUT ESOPHAGITIS: ICD-10-CM

## 2021-05-21 DIAGNOSIS — R04.2 SPITTING UP BLOOD: ICD-10-CM

## 2021-05-21 DIAGNOSIS — Z86.73 HISTORY OF STROKE: ICD-10-CM

## 2021-05-21 DIAGNOSIS — D68.61 ANTIPHOSPHOLIPID ANTIBODY WITH HYPERCOAGULABLE STATE (HCC): ICD-10-CM

## 2021-05-21 DIAGNOSIS — Z86.79 H/O AORTIC ANEURYSM REPAIR: Chronic | ICD-10-CM

## 2021-05-21 DIAGNOSIS — Z79.01 ANTICOAGULATED ON COUMADIN: ICD-10-CM

## 2021-05-21 DIAGNOSIS — R04.2 HEMOPTYSIS: Primary | ICD-10-CM

## 2021-05-21 LAB
ABO GROUP BLD: NORMAL
ALBUMIN SERPL BCP-MCNC: 3.7 G/DL (ref 3.5–5)
ALP SERPL-CCNC: 155 U/L (ref 46–116)
ALT SERPL W P-5'-P-CCNC: 46 U/L (ref 12–78)
ANION GAP SERPL CALCULATED.3IONS-SCNC: 5 MMOL/L (ref 4–13)
APTT PPP: 39 SECONDS (ref 23–37)
AST SERPL W P-5'-P-CCNC: 25 U/L (ref 5–45)
ATRIAL RATE: 63 BPM
BASOPHILS # BLD AUTO: 0.04 THOUSANDS/ΜL (ref 0–0.1)
BASOPHILS NFR BLD AUTO: 0 % (ref 0–1)
BILIRUB SERPL-MCNC: 1.75 MG/DL (ref 0.2–1)
BILIRUB UR QL STRIP: NEGATIVE
BLD GP AB SCN SERPL QL: NEGATIVE
BUN SERPL-MCNC: 17 MG/DL (ref 5–25)
CALCIUM SERPL-MCNC: 9 MG/DL (ref 8.3–10.1)
CHLORIDE SERPL-SCNC: 107 MMOL/L (ref 100–108)
CLARITY UR: CLEAR
CO2 SERPL-SCNC: 29 MMOL/L (ref 21–32)
COLOR UR: YELLOW
CREAT SERPL-MCNC: 1.15 MG/DL (ref 0.6–1.3)
EOSINOPHIL # BLD AUTO: 0.29 THOUSAND/ΜL (ref 0–0.61)
EOSINOPHIL NFR BLD AUTO: 3 % (ref 0–6)
ERYTHROCYTE [DISTWIDTH] IN BLOOD BY AUTOMATED COUNT: 13.1 % (ref 11.6–15.1)
GFR SERPL CREATININE-BSD FRML MDRD: 68 ML/MIN/1.73SQ M
GLUCOSE SERPL-MCNC: 110 MG/DL (ref 65–140)
GLUCOSE UR STRIP-MCNC: NEGATIVE MG/DL
HCT VFR BLD AUTO: 43.2 % (ref 36.5–49.3)
HCT VFR BLD AUTO: 44.3 % (ref 36.5–49.3)
HGB BLD-MCNC: 14.7 G/DL (ref 12–17)
HGB BLD-MCNC: 14.9 G/DL (ref 12–17)
HGB UR QL STRIP.AUTO: NEGATIVE
IMM GRANULOCYTES # BLD AUTO: 0.03 THOUSAND/UL (ref 0–0.2)
IMM GRANULOCYTES NFR BLD AUTO: 0 % (ref 0–2)
INR PPP: 2.23 (ref 0.84–1.19)
KETONES UR STRIP-MCNC: NEGATIVE MG/DL
LEUKOCYTE ESTERASE UR QL STRIP: NEGATIVE
LYMPHOCYTES # BLD AUTO: 2.43 THOUSANDS/ΜL (ref 0.6–4.47)
LYMPHOCYTES NFR BLD AUTO: 27 % (ref 14–44)
MCH RBC QN AUTO: 29.2 PG (ref 26.8–34.3)
MCHC RBC AUTO-ENTMCNC: 33.6 G/DL (ref 31.4–37.4)
MCV RBC AUTO: 87 FL (ref 82–98)
MONOCYTES # BLD AUTO: 0.89 THOUSAND/ΜL (ref 0.17–1.22)
MONOCYTES NFR BLD AUTO: 10 % (ref 4–12)
NEUTROPHILS # BLD AUTO: 5.45 THOUSANDS/ΜL (ref 1.85–7.62)
NEUTS SEG NFR BLD AUTO: 60 % (ref 43–75)
NITRITE UR QL STRIP: NEGATIVE
NRBC BLD AUTO-RTO: 0 /100 WBCS
P AXIS: 55 DEGREES
PH UR STRIP.AUTO: 7 [PH] (ref 4.5–8)
PLATELET # BLD AUTO: 200 THOUSANDS/UL (ref 149–390)
PMV BLD AUTO: 10.2 FL (ref 8.9–12.7)
POTASSIUM SERPL-SCNC: 3.5 MMOL/L (ref 3.5–5.3)
PR INTERVAL: 164 MS
PROT SERPL-MCNC: 7 G/DL (ref 6.4–8.2)
PROT UR STRIP-MCNC: NEGATIVE MG/DL
PROTHROMBIN TIME: 24.6 SECONDS (ref 11.6–14.5)
QRS AXIS: 39 DEGREES
QRSD INTERVAL: 84 MS
QT INTERVAL: 400 MS
QTC INTERVAL: 409 MS
RBC # BLD AUTO: 5.1 MILLION/UL (ref 3.88–5.62)
RH BLD: POSITIVE
SODIUM SERPL-SCNC: 141 MMOL/L (ref 136–145)
SP GR UR STRIP.AUTO: 1.02 (ref 1–1.03)
SPECIMEN EXPIRATION DATE: NORMAL
T WAVE AXIS: 44 DEGREES
UROBILINOGEN UR QL STRIP.AUTO: 0.2 E.U./DL
VENTRICULAR RATE: 63 BPM
WBC # BLD AUTO: 9.13 THOUSAND/UL (ref 4.31–10.16)

## 2021-05-21 PROCEDURE — 86901 BLOOD TYPING SEROLOGIC RH(D): CPT | Performed by: EMERGENCY MEDICINE

## 2021-05-21 PROCEDURE — 80053 COMPREHEN METABOLIC PANEL: CPT | Performed by: EMERGENCY MEDICINE

## 2021-05-21 PROCEDURE — 36415 COLL VENOUS BLD VENIPUNCTURE: CPT | Performed by: EMERGENCY MEDICINE

## 2021-05-21 PROCEDURE — 99285 EMERGENCY DEPT VISIT HI MDM: CPT | Performed by: EMERGENCY MEDICINE

## 2021-05-21 PROCEDURE — 99285 EMERGENCY DEPT VISIT HI MDM: CPT

## 2021-05-21 PROCEDURE — 85018 HEMOGLOBIN: CPT | Performed by: PHYSICIAN ASSISTANT

## 2021-05-21 PROCEDURE — 71260 CT THORAX DX C+: CPT

## 2021-05-21 PROCEDURE — 85025 COMPLETE CBC W/AUTO DIFF WBC: CPT | Performed by: EMERGENCY MEDICINE

## 2021-05-21 PROCEDURE — 86900 BLOOD TYPING SEROLOGIC ABO: CPT | Performed by: EMERGENCY MEDICINE

## 2021-05-21 PROCEDURE — 86850 RBC ANTIBODY SCREEN: CPT | Performed by: EMERGENCY MEDICINE

## 2021-05-21 PROCEDURE — 99217 PR OBSERVATION CARE DISCHARGE MANAGEMENT: CPT | Performed by: PHYSICIAN ASSISTANT

## 2021-05-21 PROCEDURE — 93010 ELECTROCARDIOGRAM REPORT: CPT | Performed by: INTERNAL MEDICINE

## 2021-05-21 PROCEDURE — 85014 HEMATOCRIT: CPT | Performed by: PHYSICIAN ASSISTANT

## 2021-05-21 PROCEDURE — G1004 CDSM NDSC: HCPCS

## 2021-05-21 PROCEDURE — 93005 ELECTROCARDIOGRAM TRACING: CPT

## 2021-05-21 PROCEDURE — 85730 THROMBOPLASTIN TIME PARTIAL: CPT | Performed by: EMERGENCY MEDICINE

## 2021-05-21 PROCEDURE — 74177 CT ABD & PELVIS W/CONTRAST: CPT

## 2021-05-21 PROCEDURE — 99219 PR INITIAL OBSERVATION CARE/DAY 50 MINUTES: CPT | Performed by: INTERNAL MEDICINE

## 2021-05-21 PROCEDURE — 81003 URINALYSIS AUTO W/O SCOPE: CPT

## 2021-05-21 PROCEDURE — 85610 PROTHROMBIN TIME: CPT | Performed by: EMERGENCY MEDICINE

## 2021-05-21 RX ORDER — WARFARIN SODIUM 5 MG/1
5 TABLET ORAL
Status: DISCONTINUED | OUTPATIENT
Start: 2021-05-21 | End: 2021-05-21 | Stop reason: HOSPADM

## 2021-05-21 RX ORDER — AMLODIPINE BESYLATE 5 MG/1
5 TABLET ORAL DAILY
Status: DISCONTINUED | OUTPATIENT
Start: 2021-05-21 | End: 2021-05-21 | Stop reason: HOSPADM

## 2021-05-21 RX ORDER — ASPIRIN 81 MG/1
81 TABLET ORAL DAILY
Status: DISCONTINUED | OUTPATIENT
Start: 2021-05-21 | End: 2021-05-21 | Stop reason: HOSPADM

## 2021-05-21 RX ORDER — PANTOPRAZOLE SODIUM 40 MG/1
40 TABLET, DELAYED RELEASE ORAL DAILY
Status: DISCONTINUED | OUTPATIENT
Start: 2021-05-21 | End: 2021-05-21 | Stop reason: HOSPADM

## 2021-05-21 RX ORDER — ATORVASTATIN CALCIUM 40 MG/1
40 TABLET, FILM COATED ORAL
Status: DISCONTINUED | OUTPATIENT
Start: 2021-05-21 | End: 2021-05-21 | Stop reason: HOSPADM

## 2021-05-21 RX ADMIN — IOHEXOL 100 ML: 350 INJECTION, SOLUTION INTRAVENOUS at 02:45

## 2021-05-21 RX ADMIN — ASPIRIN 81 MG: 81 TABLET, COATED ORAL at 08:46

## 2021-05-21 RX ADMIN — AMLODIPINE BESYLATE 5 MG: 5 TABLET ORAL at 08:46

## 2021-05-21 RX ADMIN — METOPROLOL SUCCINATE 150 MG: 100 TABLET, EXTENDED RELEASE ORAL at 08:46

## 2021-05-21 RX ADMIN — PANTOPRAZOLE SODIUM 40 MG: 40 TABLET, DELAYED RELEASE ORAL at 08:22

## 2021-05-21 NOTE — CASE MANAGEMENT
OBS  LOS1    Met with patient to discuss CM role and DCP  Pt lives with sister in 2 story home w/ 6STE and FF to bed/bathrooms  Independent PTA  No assisted device  Disabled  Drives  No DME  No prior homecare  Patient was inpatient at [facility] s/p stroke followed by OP rehab  Denies h/o substance abuse treatment  Admits to f/u with psychiatrist for 2 years s/p stroke for anxiety but no longer takes and medications or treatment  PCP: Dr Conroy Staff: [address]  Primary contact: sisterAdeola [phone]  She will transport home  Patient does not have POA/LW-discussed same  Declined written information  No DC needs evaluated  CM reviewed d/c planning process including the following: identifying help at home, patient preference for d/c planning needs, Discharge Lounge, [facility] Meds to Bed program, availability of treatment team to discuss questions or concerns patient and/or family may have regarding understanding medications and recognizing signs and symptoms once discharged  CM also encouraged patient to follow up with all recommended appointments after discharge  Patient advised of importance for patient and family to participate in managing patients medical well being

## 2021-05-21 NOTE — PLAN OF CARE
Problem: Potential for Falls  Goal: Patient will remain free of falls  Description: INTERVENTIONS:  - Assess patient frequently for physical needs  -  Identify cognitive and physical deficits and behaviors that affect risk of falls    -  [place] fall precautions as indicated by assessment   - Educate patient/family on patient safety including physical limitations  - Instruct patient to call for assistance with activity based on assessment  - Modify environment to reduce risk of injury  - Consider OT/PT consult to assist with strengthening/mobility  Outcome: Progressing     Problem: PAIN - ADULT  Goal: Verbalizes/displays adequate comfort level or baseline comfort level  Description: Interventions:  - Encourage patient to monitor pain and request assistance  - Assess pain using appropriate pain scale  - Administer analgesics based on type and severity of pain and evaluate response  - Implement non-pharmacological measures as appropriate and evaluate response  - Consider cultural and social influences on pain and pain management  - Notify physician/advanced practitioner if interventions unsuccessful or patient reports new pain  Outcome: Progressing     Problem: INFECTION - ADULT  Goal: Absence or prevention of progression during hospitalization  Description: INTERVENTIONS:  - Assess and monitor for signs and symptoms of infection  - Monitor lab/diagnostic results  - Monitor all insertion sites, i e  indwelling lines, tubes, and drains  - Monitor endotracheal if appropriate and nasal secretions for changes in amount and color  - [place] appropriate cooling/warming therapies per order  - Administer medications as ordered  - Instruct and encourage patient and family to use good hand hygiene technique  - Identify and instruct in appropriate isolation precautions for identified infection/condition  Outcome: Progressing  Goal: Absence of fever/infection during neutropenic period  Description: INTERVENTIONS:  - Monitor WBC    Outcome: Progressing     Problem: SAFETY ADULT  Goal: Patient will remain free of falls  Description: INTERVENTIONS:  - Assess patient frequently for physical needs  -  Identify cognitive and physical deficits and behaviors that affect risk of falls    -  [place] fall precautions as indicated by assessment   - Educate patient/family on patient safety including physical limitations  - Instruct patient to call for assistance with activity based on assessment  - Modify environment to reduce risk of injury  - Consider OT/PT consult to assist with strengthening/mobility  Outcome: Progressing  Goal: Maintain or return to baseline ADL function  Description: INTERVENTIONS:  -  Assess patient's ability to carry out ADLs; assess patient's baseline for ADL function and identify physical deficits which impact ability to perform ADLs (bathing, care of mouth/teeth, toileting, grooming, dressing, etc )  - Assess/evaluate cause of self-care deficits   - Assess range of motion  - Assess patient's mobility; develop plan if impaired  - Assess patient's need for assistive devices and provide as appropriate  - Encourage maximum independence but intervene and supervise when necessary  - Involve family in performance of ADLs  - Assess for home care needs following discharge   - Consider OT consult to assist with ADL evaluation and planning for discharge  - Provide patient education as appropriate  Outcome: Progressing  Goal: Maintain or return mobility status to optimal level  Description: INTERVENTIONS:  - Assess patient's baseline mobility status (ambulation, transfers, stairs, etc )    - Identify cognitive and physical deficits and behaviors that affect mobility  - Identify mobility aids required to assist with transfers and/or ambulation (gait belt, sit-to-stand, lift, walker, cane, etc )  - [place] fall precautions as indicated by assessment  - Record patient progress and toleration of activity level on Mobility SBAR; progress patient to next Phase/Stage  - Instruct patient to call for assistance with activity based on assessment  - Consider rehabilitation consult to assist with strengthening/weightbearing, etc   Outcome: Progressing     Problem: DISCHARGE PLANNING  Goal: Discharge to home or other facility with appropriate resources  Description: INTERVENTIONS:  - Identify barriers to discharge w/patient and caregiver  - Arrange for needed discharge resources and transportation as appropriate  - Identify discharge learning needs (meds, wound care, etc )  - Arrange for interpretive services to assist at discharge as needed  - Refer to Case Management Department for coordinating discharge planning if the patient needs post-hospital services based on physician/advanced practitioner order or complex needs related to functional status, cognitive ability, or social support system  Outcome: Progressing     Problem: Knowledge Deficit  Goal: Patient/family/caregiver demonstrates understanding of disease process, treatment plan, medications, and discharge instructions  Description: Complete learning assessment and assess knowledge base    Interventions:  - Provide teaching at level of understanding  - Provide teaching via preferred learning methods  Outcome: Progressing

## 2021-05-21 NOTE — DISCHARGE INSTRUCTIONS
Blood Thinners   WHAT YOU NEED TO KNOW:   Blood thinners are medicines that prevent blood clots from forming in an artery, vein, or the heart  These medicines may also prevent a blood clot from getting bigger  Blood clots prevent the flow of blood to organs and tissues such as the heart or a leg  The two main types of blood thinners are antiplatelet medicine and anticoagulant medicine  Antiplatelet medicine prevents platelets from sticking together and forming a clot  Anticoagulant medicine prevents the blood from clotting too much  DISCHARGE INSTRUCTIONS:   Call 911 for any of the following:   · You have any of the following signs of a heart attack:      ? Squeezing, pressure, or pain in your chest    ? You may  also have any of the following:     ? Discomfort or pain in your back, neck, jaw, stomach, or arm    ? Shortness of breath    ? Nausea or vomiting    ? Lightheadedness or a sudden cold sweat    · You have any of the following signs of a stroke:      ? Numbness or drooping on one side of your face     ? Weakness in an arm or leg    ? Confusion or difficulty speaking    ? Dizziness, a severe headache, or vision loss    · You feel lightheaded, short of breath, and have chest pain  · You cough up blood  · You have trouble breathing  · You are bleeding from a wound or skin injury and it won't stop after holding pressure for 10 minutes  · You have a fall or injury to the head  Seek care immediately if:   · You have a bad stomachache or headache that does not go away  · You feel weak, faint, or dizzy  · You vomit blood  · You have a fall or injury to any part of your body other than your head  · You are pregnant or think you may be pregnant  Contact your healthcare provider if:   · Your urine is red or dark brown  · Your bowel movements are red, dark brown, or black  · You bleed more than usual during your period  · You find bruises or blood blisters on your skin  · Your skin is itchy, swollen, or you have a rash  · You have questions or concerns about your condition or care  Foods and medicines to avoid:  Do not start any new medicines, vitamins, or herbal supplements before you talk to your healthcare provider  Do not make changes to your diet without talking to your healthcare provider  There are many medicines, vitamins, and herbal supplements that may prevent blood thinners from working correctly  Ask your healthcare provider for a full list of foods and medicines that can prevent anticoagulants from working correctly  The following may cause severe bleeding, or prevent anticoagulants from working correctly:  · Alcohol  may increase your risk for bleeding when taken with anticoagulants  Do not drink alcohol unless your healthcare provider says it is okay  · Changes in your regular vitamin K intake  can prevent anticoagulants, such as warfarin, from working correctly  Anticoagulants work best if you eat the same amount of vitamin K every day  Some foods that contain vitamin K include spinach, kale, broccoli, colette lettuce, clifford greens, and kiwi  Ask your healthcare provider for more information about foods that contain vitamin K      · NSAIDs  may increase your risk for bleeding  Examples include ibuprofen, aspirin, and naproxen  Do not take these medicines unless your healthcare provider says it is okay  · Some antibiotics  may increase your risk for bleeding  Talk to your healthcare provider before you take antibiotics  · Alternative medicines  such as ginkgo biloba, garlic, chamomile, and St  Darryl's wort, should not be taken with anticoagulants  Some may prevent anticoagulants from working, and others may increase your risk for bleeding  Self-care:   · Do not play contact sports  This may increase your risk for bleeding if you get injured or hit  Walk, swim, or do yoga to get exercise   Ask your healthcare provider about other exercises that are safe  · Use an electric razor to shave  This may prevent cuts that could bleed  · Use a soft bristled tooth brush and wax floss  This may prevent bleeding from your gums  · Prevent falls in and out of your home  Wear non-slip shoes or slippers when you are out of bed  Keep walkways clear  Remove throw rugs and other objects that can cause you to trip and fall  Use assistive devices as directed  · Be careful with sharp objects  Wear gloves when you work outside with sharp tools or in the garden  · Use contraception to prevent a pregnancy  Anticoagulants can cause problems with your baby, and dangerous bleeding during pregnancy  · Carry your medicine with you when you travel  This will prevent you from missing a dose of medicine if your bag gets lost  Talk to your healthcare provider before you travel  · Wear or carry a medical alert bracelet or necklace at all times  This will alert healthcare providers that you take an anticoagulant if you are unconscious or need emergency medical treatment  Other important information:   · Tell all of your healthcare providers and dentist that you take a blood thinner  This will help them plan for procedures and surgeries  It will also prevent them from giving you medicine that may interact with your blood thinner  · You should not take antiplatelet medicine  if you have liver or kidney disease, a peptic ulcer, or gastrointestinal disease  You should also not take this medicine if you have a bleeding disorder, uncontrolled asthma, or uncontrolled high blood pressure  These conditions may increase your risk for bleeding  · Take anticoagulants exactly as prescribed  Do not double up on a dose if you have missed a dose  Some anticoagulants should be taken at the same time every day  · Keep appointments for blood tests  if you are taking warfarin   Blood tests will help your healthcare provider make changes to your dose of anticoagulants so that they work correctly  The blood test will measure the amount of time it takes for your blood to clot  This is called the international normalized ratio (INR)  If your INR is too high, you may be at an increased risk for bleeding  If your INR is too low, you may be at an increased risk for blood clots  Your healthcare provider may change the dose of your anticoagulant medicine depending on the results of your blood test     Follow up with your healthcare provider as directed:  Write down your questions so you remember to ask them during your visits  © Copyright [address] [place] [address] Drive Information is for End User's use only and may not be sold, redistributed or otherwise used for commercial purposes  All illustrations and images included in CareNotes® are the copyrighted property of A D A M , Inc  or [facility] Amber Gómez   The above information is an  only  It is not intended as medical advice for individual conditions or treatments  Talk to your doctor, nurse or pharmacist before following any medical regimen to see if it is safe and effective for you

## 2021-05-21 NOTE — ED ATTENDING ATTESTATION
5/21/2021  IAlisha MD, saw and evaluated the patient  I have discussed the patient with the resident/non-physician practitioner and agree with the resident's/non-physician practitioner's findings, Plan of Care, and MDM as documented in the resident's/non-physician practitioner's note, except where noted  All available labs and Radiology studies were reviewed  I was present for key portions of any procedure(s) performed by the resident/non-physician practitioner and I was immediately available to provide assistance  At this point I agree with the current assessment done in the Emergency Department  I have conducted an independent evaluation of this patient a history and physical is as follows:    OA: 65 y/o m with h/o AAA s/p repair, HTN, 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, antiphospholipid atrial fibrillation currently on coumadin who p/w an episode of hemoptysis  Pt states that he went to bed and when he used the bathroom, he coughed up blood  Denies any forceful coughing or sick sxms otherwise  Denies cp/pressure/palpitations/lightheadedness/dizziness/headache  + lower abdominal pain that is described as throbbing as well as issues with what he describes as "GERD"  + occasional night sweats  + dark stools  PE, NAD, VSS, NC/AT, MMM, mildly pale conjunctiva, neck supple/fROM,irregular rhythm, no murmurs, multiple PVS noted on monitor during eval, abd soft, + mild ttp over upper epigastric region and lower abdomen, -r/g, - edema, - calf ttp, intact distal pulses and capillary refill < 2 sec, AAO  A/p hemoptysis on blood thinners with melena  As pt is currently stable, will eval with labs, EKG, imaging and treat accordingly  Given sxms and history on anticoagulation, will advise admission           ED Course         Critical Care Time  Procedures

## 2021-05-21 NOTE — DISCHARGE SUMMARY
Discharge Summary - [address] [facility]    Patient Information: Joey Stevens 64 y o  male MRN: [id number]  [facility] Encounter: [id number]    Discharging Physician / Practitioner: Alejandra Robert PA-C  PCP: Ruben Fonseca MD  Admission Date: 5/21/2021  Discharge Date: 05/21/21    Reason for Admission: spitting up blood     Discharge Diagnoses:     Principal Problem:    Spitting up blood  Active Problems:    Hypertension    GERD (gastroesophageal reflux disease)    Atrial fibrillation ([address] )    Hyperlipidemia    Anticoagulated on Coumadin  Resolved Problems:    * No resolved hospital problems  *      Consultations During Hospital Stay:  · None    Procedures Performed:   · CT chest, abdomen, and pelvis with contrast  · CMP  · CBC  · INR    Significant Findings:   · CT chest, abdomen, and pelvis with contrast:  No acute intra-abdominal abnormality  No free air or free fluid  No infiltrate or pleural effusion  No acute intrathoracic abnormality  · Hemoglobin:  14 9, 14 7  · INR: 2 23    Incidental Findings:   · None     Test Results Pending at Discharge (will require follow up): · None     Outpatient Tests Requested:  · Follow up with PCP  · Establish care with GI    Complications:  None    Hospital Course:     Joey Stevens is a 64 y o  male with a history of strokes (patient had been on Eliquis in the past after Xarelto failure, and Eliquis was transitioned to Coumadin during recent hospitalization), A  Fib, history of aortic aneurysm repair, CORIE, antiphospholipid syndrome, obesity (Body mass index is 39 55 kg/m² ), HLD, GERD, and HTN who originally presented to the hospital on 5/21/2021 due to spitting up blood  Patient reports spitting up bright red blood x2 which resolved with rinsing  He also reported intermittent dark stools every 3 weeks  INR 2 23 on admission    CT of the chest, abdomen, and pelvis with contrast in the emergency department revealed no acute intrathoracic or intra-abdominal abnormality per the results report  Hemoglobin was 14 9 on admission  Repeat hemoglobin stable at 14 7  No further episodes of spitting up blood  Recommend outpatient follow up with PCP and establish case with GI as outpatient  Discussed with [facility] attending on day of discharge  Discussed concerning signs and symptoms which should prompt the patient to seek medical attention or return to the emergency department, and he expressed verbal understanding  Condition at Discharge: stable     Discharge Day Visit / Exam:     Subjective:    Mr Dania Luevano reports that he spit up blood x2 last night around 10:30  He denies any further episodes  Vitals: Blood Pressure: 120/64 (05/21/21 0729)  Pulse: (!) 47 (05/21/21 0729)  Temperature: 98 2 °F (36 8 °C) (05/21/21 0729)  Temp Source: Oral (05/21/21 0323)  Respirations: 18 (05/21/21 0729)  Weight - Scale: 118 kg (260 lb 2 3 oz) (05/21/21 0323)  SpO2: 96 % (05/21/21 0729)  Exam:   Physical Exam  Vitals signs and nursing note reviewed  Constitutional:       Comments: Patient seen sitting in bed comfortably resting  NAD   Cardiovascular:      Rate and Rhythm: Normal rate and regular rhythm  Pulmonary:      Effort: Pulmonary effort is normal       Breath sounds: Normal breath sounds  Abdominal:      General: Bowel sounds are normal       Palpations: Abdomen is soft  Tenderness: There is no abdominal tenderness  Musculoskeletal:      Right lower leg: No edema  Left lower leg: No edema  Skin:     General: Skin is warm  Neurological:      Mental Status: He is alert and oriented to person, place, and time  Comments: LUE tremor   Psychiatric:         Mood and Affect: Mood normal          Behavior: Behavior normal          Discharge instructions/Information to patient and family:   See after visit summary for information provided to patient and family        Provisions for Follow-Up Care:  See after visit summary for information related to follow-up care and any pertinent home health orders  Disposition:     Home    For Discharges to [facility] SNF:   · Not Applicable to this Patient - Not Applicable to this Patient    Planned Readmission: None     Discharge Statement:  I spent 32 minutes discharging the patient  This time was spent on the day of discharge  I had direct contact with the patient on the day of discharge  Greater than 50% of the total time was spent examining patient, answering all patient questions, arranging and discussing plan of care with patient as well as directly providing post-discharge instructions  Additional time then spent on discharge activities  Discharge Medications:  See after visit summary for reconciled discharge medications provided to patient and family  ** Please Note: Dragon 360 Dictation voice to text software may have been used in the creation of this document   **

## 2021-05-21 NOTE — PLAN OF CARE
Problem: Potential for Falls  Goal: Patient will remain free of falls  Description: INTERVENTIONS:  - Assess patient frequently for physical needs  -  Identify cognitive and physical deficits and behaviors that affect risk of falls    -  [place] fall precautions as indicated by assessment   - Educate patient/family on patient safety including physical limitations  - Instruct patient to call for assistance with activity based on assessment  - Modify environment to reduce risk of injury  - Consider OT/PT consult to assist with strengthening/mobility  5/21/2021 1047 by Dalila Wallace RN  Outcome: Completed  5/21/2021 0852 by Dalila Wallace RN  Outcome: Progressing     Problem: PAIN - ADULT  Goal: Verbalizes/displays adequate comfort level or baseline comfort level  Description: Interventions:  - Encourage patient to monitor pain and request assistance  - Assess pain using appropriate pain scale  - Administer analgesics based on type and severity of pain and evaluate response  - Implement non-pharmacological measures as appropriate and evaluate response  - Consider cultural and social influences on pain and pain management  - Notify physician/advanced practitioner if interventions unsuccessful or patient reports new pain  5/21/2021 1047 by Dalila Wallace RN  Outcome: Completed  5/21/2021 0852 by Dalila Wallace RN  Outcome: Progressing     Problem: INFECTION - ADULT  Goal: Absence or prevention of progression during hospitalization  Description: INTERVENTIONS:  - Assess and monitor for signs and symptoms of infection  - Monitor lab/diagnostic results  - Monitor all insertion sites, i e  indwelling lines, tubes, and drains  - Monitor endotracheal if appropriate and nasal secretions for changes in amount and color  - [place] appropriate cooling/warming therapies per order  - Administer medications as ordered  - Instruct and encourage patient and family to use good hand hygiene technique  - Identify and instruct in appropriate isolation precautions for identified infection/condition  5/21/2021 1047 by Jazmine Bonner RN  Outcome: Completed  5/21/2021 0852 by Jazmine Bonner RN  Outcome: Progressing  Goal: Absence of fever/infection during neutropenic period  Description: INTERVENTIONS:  - Monitor WBC    5/21/2021 1047 by Jazmine Bonner RN  Outcome: Completed  5/21/2021 0852 by Jazmine Bonner RN  Outcome: Progressing     Problem: SAFETY ADULT  Goal: Patient will remain free of falls  Description: INTERVENTIONS:  - Assess patient frequently for physical needs  -  Identify cognitive and physical deficits and behaviors that affect risk of falls    -  [place] fall precautions as indicated by assessment   - Educate patient/family on patient safety including physical limitations  - Instruct patient to call for assistance with activity based on assessment  - Modify environment to reduce risk of injury  - Consider OT/PT consult to assist with strengthening/mobility  5/21/2021 1047 by Jazmine Bonner RN  Outcome: Completed  5/21/2021 0852 by Jazmine Bonner RN  Outcome: Progressing  Goal: Maintain or return to baseline ADL function  Description: INTERVENTIONS:  -  Assess patient's ability to carry out ADLs; assess patient's baseline for ADL function and identify physical deficits which impact ability to perform ADLs (bathing, care of mouth/teeth, toileting, grooming, dressing, etc )  - Assess/evaluate cause of self-care deficits   - Assess range of motion  - Assess patient's mobility; develop plan if impaired  - Assess patient's need for assistive devices and provide as appropriate  - Encourage maximum independence but intervene and supervise when necessary  - Involve family in performance of ADLs  - Assess for home care needs following discharge   - Consider OT consult to assist with ADL evaluation and planning for discharge  - Provide patient education as appropriate  5/21/2021 1047 by Jazmine Bonner RN  Outcome: Completed  5/21/2021 0852 by Akbar Avendaño RN  Outcome: Progressing  Goal: Maintain or return mobility status to optimal level  Description: INTERVENTIONS:  - Assess patient's baseline mobility status (ambulation, transfers, stairs, etc )    - Identify cognitive and physical deficits and behaviors that affect mobility  - Identify mobility aids required to assist with transfers and/or ambulation (gait belt, sit-to-stand, lift, walker, cane, etc )  - [place] fall precautions as indicated by assessment  - Record patient progress and toleration of activity level on Mobility SBAR; progress patient to next Phase/Stage  - Instruct patient to call for assistance with activity based on assessment  - Consider rehabilitation consult to assist with strengthening/weightbearing, etc   5/21/2021 1047 by Akbar Avendaño RN  Outcome: Completed  5/21/2021 0852 by Akbar Avendaño RN  Outcome: Progressing     Problem: DISCHARGE PLANNING  Goal: Discharge to home or other facility with appropriate resources  Description: INTERVENTIONS:  - Identify barriers to discharge w/patient and caregiver  - Arrange for needed discharge resources and transportation as appropriate  - Identify discharge learning needs (meds, wound care, etc )  - Arrange for interpretive services to assist at discharge as needed  - Refer to Case Management Department for coordinating discharge planning if the patient needs post-hospital services based on physician/advanced practitioner order or complex needs related to functional status, cognitive ability, or social support system  5/21/2021 1047 by Akbar Avendaño RN  Outcome: Completed  5/21/2021 0852 by Akbar Avendaño RN  Outcome: Progressing     Problem: Knowledge Deficit  Goal: Patient/family/caregiver demonstrates understanding of disease process, treatment plan, medications, and discharge instructions  Description: Complete learning assessment and assess knowledge base    Interventions:  - Provide teaching at level of understanding  - Provide teaching via preferred learning methods  5/21/2021 1047 by Dale Turner RN  Outcome: Completed  5/21/2021 0852 by Dale Turner RN  Outcome: Progressing

## 2021-05-21 NOTE — ED PROVIDER NOTES
History  Chief Complaint   Patient presents with    Abdominal Pain     Pt reports coughing up blood that started today and abdominal pain that has been going on for a few days  Hx of TIAs recently hospitalize and put on warfarin     17-year-old male history of aortic aneurysm repair, multiple CVAs on warfarin presents with hemoptysis  Patient states that when he went to use the restroom this evening, he coughed and spat up blood into the toilet  States it was bright red, not just saliva with blood streaking  Is unable to quantify the amount but states that this was the only episode and it was an overall small amount  States he has never had this happen before  He called the PCP office and was told to come to the ED for evaluation  Patient denies any other bleeding at this time, however he does note that he intermittently has dark stool which has been going on for many months, however denies any at this time  Denies any recent nose bleed, any hematuria  Denies chronic cough, chest pain, abdominal pain, shortness of breath, recent illness, known sick contacts  Patient previously on Eliquis but failed after recent stroke that left him with left upper quadrantanopia  Prior CVAs were larger but he has largely regained functioning after previous right hemiparesis, mood problems and delusional thinking  He states he was previously depressed and paranoid  He endorses residual left upper extremity tremor  Prior to Admission Medications   Prescriptions Last Dose Informant Patient Reported? Taking?    amLODIPine (NORVASC) 5 mg tablet   No No   Sig: TAKE 1 TABLET BY MOUTH EVERY DAY   aspirin (ECOTRIN LOW STRENGTH) 81 mg EC tablet  Self No No   Sig: Take 1 tablet (81 mg total) by mouth daily   atorvastatin (LIPITOR) 40 mg tablet   No No   Sig: TAKE 1 TABLET BY MOUTH DAILY WITH DINNER   cholecalciferol (VITAMIN D3) 1,000 units tablet  Self No No   Sig: Take 1 tablet (1,000 Units total) by mouth daily metoprolol succinate (TOPROL-XL) 50 mg 24 hr tablet  Self No No   Sig: TAKE 3 TABLETS BY MOUTH EVERY DAY   pantoprazole (PROTONIX) 40 mg tablet   No No   Sig: TAKE 1 TABLET BY MOUTH EVERY DAY   warfarin (COUMADIN) 5 mg tablet   No No   Sig: Take 1 tablet (5 mg total) by mouth daily      Facility-Administered Medications: None       Past Medical History:   Diagnosis Date    Aneurysm of thoracic aorta ([address] )     last assessed 11/20/17    Anxiety     currently on no meds    Cardiac disease     Cholelithiasis     Chronic kidney disease     GERD (gastroesophageal reflux disease)     Headache due to anesthesia     Hyperlipidemia     Hypertension     Irritable bowel syndrome     Kidney stone     Palpitations     PONV (postoperative nausea and vomiting)     Shortness of breath     ? related to acid reflux    Sleep apnea     not sure    Stroke [facility]) 11/2014    TIA (transient ischemic attack)        Past Surgical History:   Procedure Laterality Date    AORTA SURGERY      thoracic - aneurysmorrhaphy    APPENDECTOMY      ASCENDING AORTIC ANEURYSM REPAIR      resolved 8/19/15    CHOLECYSTECTOMY      laparoscopic    CYSTOSCOPY      with removal of object- stent removal    KNEE ARTHROSCOPY Right 1996    with medial meniscus repair    LITHOTRIPSY      renal    NH FRAGMENT KIDNEY STONE/ ESWL Left 5/17/2018    Procedure: LITHROTRIPSY EXTRACORPORAL SHOCKWAVE (ESWL);   Surgeon: Leatha Anthony MD;  Location: [facility];  Service: Urology    THUMB ARTHROSCOPY Right 1976    ligament repair       Family History   Problem Relation Age of Onset    Diverticulitis Mother         of colon    Nephrolithiasis Mother     Emphysema Father     Nephrolithiasis Sister     Heart attack Maternal Grandfather 72    Breast cancer Paternal Grandmother     Lung cancer Paternal Grandfather     Cancer Family     Coronary artery disease Family     Diabetes Family         sibling    Hypertension Family         sibling   Elsa Scott Hernia Family         sibling     I have reviewed and agree with the history as documented  E-Cigarette/Vaping    E-Cigarette Use Never User      E-Cigarette/Vaping Substances    Nicotine No     THC No     CBD No     Flavoring No     Other No     Unknown No      Social History     Tobacco Use    Smoking status: Never Smoker    Smokeless tobacco: Never Used    Tobacco comment: no second hand smoke exposure   Substance Use Topics    Alcohol use: Not Currently    Drug use: No        Review of Systems   Constitutional: Negative for chills and fever  HENT: Negative for ear pain, sinus pain and sore throat  Eyes: Positive for visual disturbance  Negative for pain  Respiratory: Negative for choking and shortness of breath  Cardiovascular: Negative for chest pain  Gastrointestinal: Negative for abdominal pain, diarrhea, nausea and vomiting  Genitourinary: Negative for difficulty urinating, dysuria and flank pain  Musculoskeletal: Negative for back pain and neck pain  Neurological: Positive for tremors  All other systems reviewed and are negative  Physical Exam  ED Triage Vitals   Temperature Pulse Respirations Blood Pressure SpO2   05/21/21 0323 05/21/21 0036 05/21/21 0036 05/21/21 0036 05/21/21 0036   98 7 °F (37 1 °C) 61 18 (!) 171/88 97 %      Temp Source Heart Rate Source Patient Position - Orthostatic VS BP Location FiO2 (%)   05/21/21 0323 05/21/21 0200 05/21/21 0200 -- --   Oral Monitor Lying        Pain Score       05/21/21 0630       No Pain             Orthostatic Vital Signs  Vitals:    05/21/21 0036 05/21/21 0200 05/21/21 0400 05/21/21 0729   BP: (!) 171/88 135/69 138/82 120/64   Pulse: 61 56 58 (!) 47   Patient Position - Orthostatic VS:  Lying         Physical Exam  Vitals signs and nursing note reviewed  Constitutional:       General: He is not in acute distress  Appearance: He is well-developed  He is obese   He is not ill-appearing, toxic-appearing or diaphoretic  HENT:      Head: Normocephalic  Nose: Nose normal       Mouth/Throat:      Mouth: Mucous membranes are moist       Pharynx: No pharyngeal swelling or oropharyngeal exudate  Comments: No blood noted in oropharynx nor nares  Eyes:      General: No scleral icterus  Right eye: No discharge  Left eye: No discharge  Extraocular Movements: Extraocular movements intact  Conjunctiva/sclera: Conjunctivae normal    Neck:      Musculoskeletal: Normal range of motion  Cardiovascular:      Rate and Rhythm: Normal rate  Heart sounds: Normal heart sounds  Pulmonary:      Effort: Pulmonary effort is normal  No respiratory distress  Breath sounds: Normal breath sounds  No stridor  No wheezing, rhonchi or rales  Abdominal:      General: Abdomen is protuberant  There is no distension  Palpations: Abdomen is soft  Tenderness: There is no abdominal tenderness  There is no guarding or rebound  Genitourinary:     Rectum: Guaiac result negative  Skin:     General: Skin is warm and dry  Capillary Refill: Capillary refill takes less than 2 seconds  Neurological:      Mental Status: He is alert and oriented to person, place, and time  Cranial Nerves: No cranial nerve deficit  Comments: Mildly delayed processing   Psychiatric:         Behavior: Behavior normal       Comments: Mildly flat affect   Pleasant, cooperative         ED Medications  Medications   iohexol (OMNIPAQUE) 350 MG/ML injection (MULTI-DOSE) 100 mL (100 mL Intravenous Given 5/21/21 0245)       Diagnostic Studies  Results Reviewed     Procedure Component Value Units Date/Time    Comprehensive metabolic panel [[id number]]  (Abnormal) Collected: 05/21/21 0130    Lab Status: Final result Specimen: Blood from Arm, Right Updated: 05/21/21 0214     Sodium 141 mmol/L      Potassium 3 5 mmol/L      Chloride 107 mmol/L      CO2 29 mmol/L      ANION GAP 5 mmol/L      BUN 17 mg/dL      Creatinine 1 15 mg/dL      Glucose 110 mg/dL      Calcium 9 0 mg/dL      AST 25 U/L      ALT 46 U/L      Alkaline Phosphatase 155 U/L      Total Protein 7 0 g/dL      Albumin 3 7 g/dL      Total Bilirubin 1 75 mg/dL      eGFR 68 ml/min/1 73sq m     Narrative:      Sarah guidelines for Chronic Kidney Disease (CKD):     Stage 1 with normal or high GFR (GFR > 90 mL/min/1 73 square meters)    Stage 2 Mild CKD (GFR = 60-89 mL/min/1 73 square meters)    Stage 3A Moderate CKD (GFR = 45-59 mL/min/1 73 square meters)    Stage 3B Moderate CKD (GFR = 30-44 mL/min/1 73 square meters)    Stage 4 Severe CKD (GFR = 15-29 mL/min/1 73 square meters)    Stage 5 End Stage CKD (GFR <15 mL/min/1 73 square meters)  Note: GFR calculation is accurate only with a steady state creatinine    Protime-INR [[id number]]  (Abnormal) Collected: 05/21/21 0130    Lab Status: Final result Specimen: Blood from Arm, Right Updated: 05/21/21 0214     Protime 24 6 seconds      INR 2 23    APTT [[id number]]  (Abnormal) Collected: 05/21/21 0130    Lab Status: Final result Specimen: Blood from Arm, Right Updated: 05/21/21 0214     PTT 39 seconds     Urine Macroscopic, POC [[id number]] Collected: 05/21/21 0157    Lab Status: Final result Specimen: Urine Updated: 05/21/21 0159     Color, UA Yellow     Clarity, UA Clear     pH, UA 7 0     Leukocytes, UA Negative     Nitrite, UA Negative     Protein, UA Negative mg/dl      Glucose, UA Negative mg/dl      Ketones, UA Negative mg/dl      Urobilinogen, UA 0 2 E U /dl      Bilirubin, UA Negative     Blood, UA Negative     Specific Gravity, UA 1 020    Narrative:      CLINITEK RESULT    CBC and differential [[id number]] Collected: 05/21/21 0130    Lab Status: Final result Specimen: Blood from Arm, Right Updated: 05/21/21 0149     WBC 9 13 Thousand/uL      RBC 5 10 Million/uL      Hemoglobin 14 9 g/dL      Hematocrit 44 3 %      MCV 87 fL      MCH 29 2 pg      MCHC 33 6 g/dL      RDW 13 1 %      MPV 10 2 fL      Platelets 246 Thousands/uL      nRBC 0 /100 WBCs      Neutrophils Relative 60 %      Immat GRANS % 0 %      Lymphocytes Relative 27 %      Monocytes Relative 10 %      Eosinophils Relative 3 %      Basophils Relative 0 %      Neutrophils Absolute 5 45 Thousands/µL      Immature Grans Absolute 0 03 Thousand/uL      Lymphocytes Absolute 2 43 Thousands/µL      Monocytes Absolute 0 89 Thousand/µL      Eosinophils Absolute 0 29 Thousand/µL      Basophils Absolute 0 04 Thousands/µL                  CT chest abdomen pelvis w contrast   Final Result by Tc Mercado MD (05/21 0316)      No acute intra-abdominal abnormality  No free air or free fluid  No infiltrate or pleural effusion  No acute intrathoracic abnormality  Workstation performed: [id number]               Procedures  Procedures      ED Course                                       MDM  Number of Diagnoses or Management Options  Anticoagulated on Coumadin:   Antiphospholipid antibody with hypercoagulable state ([address] ):   Gastroesophageal reflux disease without esophagitis:   H/O aortic aneurysm repair:   Hemoptysis:   History of stroke:   Paroxysmal atrial fibrillation [facility]):   Diagnosis management comments: Labs and scan  Plan to admit for possible bronchoscopy versus EGD given anticoagulation and no obvious source, patient surgical history  Patient appreciative and in agreement with plan            Disposition  Final diagnoses:   Hemoptysis   Anticoagulated on Coumadin   Antiphospholipid antibody with hypercoagulable state ([address] )   H/O aortic aneurysm repair   Paroxysmal atrial fibrillation (HCC)   History of stroke   Gastroesophageal reflux disease without esophagitis     Time reflects when diagnosis was documented in both MDM as applicable and the Disposition within this note     Time User Action Codes Description Comment    5/21/2021  4:27 AM Shannan Whitten [R04 2] Hemoptysis     5/21/2021  4:27 AM Rocio Whitten [Z79 01] Anticoagulated on Coumadin     5/21/2021  4:27 AM Meet Loser T Add [D68 61] Antiphospholipid antibody with hypercoagulable state ([address] )     5/21/2021  4:27 AM Meet Loser T Add [I63 9] Acute CVA (cerebrovascular accident) ([address] )     5/21/2021  4:27 AM Georgia Crimes Add [I57 458,  Z86 79] H/O aortic aneurysm repair     5/21/2021  4:27 AM Meet Loser T Add [I48 0] Paroxysmal atrial fibrillation ([facility] )     5/21/2021  4:28 AM Georgia Crimes Add [Z86 73] History of stroke     5/21/2021  4:28 AM Georgia Crimes Remove [I63 9] Acute CVA (cerebrovascular accident) ([facility] )     5/21/2021  4:28 AM Meet Loser T Add [K21 9] Gastroesophageal reflux disease without esophagitis     5/21/2021 10:52 AM Luciana Lugo Add [R04 2] Spitting up blood       ED Disposition     ED Disposition Condition Date/Time Comment    Admit Stable Fri May 21, 2021  4:31 AM Case was discussed with Dr Joaquim Kehr and the patient's admission status was agreed to be Admission Status: observation status to the service of Dr Joaquim Kehr          Follow-up Information     Follow up With Specialties Details Why Contact Info    Jatin Gutierrez MD Family Medicine Schedule an appointment as soon as possible for a visit  [address] [place] [address]  [phone]            Discharge Medication List as of 5/21/2021 10:59 AM      CONTINUE these medications which have NOT CHANGED    Details   amLODIPine (NORVASC) 5 mg tablet TAKE 1 TABLET BY MOUTH EVERY DAY, Normal      aspirin (ECOTRIN LOW STRENGTH) 81 mg EC tablet Take 1 tablet (81 mg total) by mouth daily, Starting Fri 3/27/2020, Normal      atorvastatin (LIPITOR) 40 mg tablet TAKE 1 TABLET BY MOUTH DAILY WITH DINNER, Normal      cholecalciferol (VITAMIN D3) 1,000 units tablet Take 1 tablet (1,000 Units total) by mouth daily, Starting Mon 3/30/2020, Until u 5/6/2021, Normal      metoprolol succinate (TOPROL-XL) 50 mg 24 hr tablet TAKE 3 TABLETS BY MOUTH EVERY DAY, Normal      pantoprazole (PROTONIX) 40 mg tablet TAKE 1 TABLET BY MOUTH EVERY DAY, Normal      warfarin (COUMADIN) 5 mg tablet Take 1 tablet (5 mg total) by mouth daily, Starting Tue 5/4/2021, Normal               PDMP Review     None           ED Provider  Attending physically available and evaluated Mirellatyler Busby I managed the patient along with the ED Attending      Electronically Signed by         Jean-Paul Avendaño MD  05/21/21 8876

## 2021-05-21 NOTE — UTILIZATION REVIEW
Initial Clinical Review    Admission: Date/Time/Statement:   Admission Orders (From admission, onward)     Ordered        05/21/21 0431  Place in Observation  Once                   Orders Placed This Encounter   Procedures    Place in Observation     Standing Status:   Standing     Number of Occurrences:   1     Order Specific Question:   Level of Care     Answer:   Med Surg [16]     ED Arrival Information     Expected Arrival Acuity Means of Arrival Escorted By Service Admission Type    - 5/21/2021 00:14 Urgent Walk-In Self Hospitalist Urgent    Arrival Complaint    Coughing up blood        Chief Complaint   Patient presents with    Abdominal Pain     Pt reports coughing up blood that started today and abdominal pain that has been going on for a few days  Hx of TIAs recently hospitalize and put on warfarin       Initial Presentation:         64year old male presents to ed from home for evaluation and treatment of abdominal pain and hemoptysis  PMHX: CVA , AFIB on anticoagulation Clinical assessment  INR 2 23, Hb 14 7, imaging without acute finding  No further episodes  Admit to observation  Discharged 5-21 to outpatient GI follow up              ED Triage Vitals   Temperature Pulse Respirations Blood Pressure SpO2   05/21/21 0323 05/21/21 0036 05/21/21 0036 05/21/21 0036 05/21/21 0036   98 7 °F (37 1 °C) 61 18 (!) 171/88 97 %      Temp Source Heart Rate Source Patient Position - Orthostatic VS BP Location FiO2 (%)   05/21/21 0323 05/21/21 0200 05/21/21 0200 -- --   Oral Monitor Lying        Pain Score       05/21/21 0630       No Pain          Wt Readings from Last 1 Encounters:   05/21/21 118 kg (260 lb 2 3 oz)     Additional Vital Signs:     Vitals:    05/21/21 0200 05/21/21 0323 05/21/21 0400 05/21/21 0729   BP: 135/69  138/82 120/64   Pulse: 56  58 (!) 47   Resp: 16  16 18   Temp:  98 7 °F (37 1 °C)  98 2 °F (36 8 °C)   TempSrc:  Oral     SpO2: 95%  97% 96%   Weight:  118 kg (260 lb 2 3 oz) Pertinent Labs/Diagnostic Test Results:     CT chest abdomen pelvis w contrast    (05/21 0316)      No acute intra-abdominal abnormality  No free air or free fluid  No infiltrate or pleural effusion  No acute intrathoracic abnormality                          Results from last 7 days   Lab Units 05/21/21  0924 05/21/21  0130   WBC Thousand/uL  --  9 13   HEMOGLOBIN g/dL 14 7 14 9   HEMATOCRIT % 43 2 44 3   PLATELETS Thousands/uL  --  200   NEUTROS ABS Thousands/µL  --  5 45         Results from last 7 days   Lab Units 05/21/21  0130   SODIUM mmol/L 141   POTASSIUM mmol/L 3 5   CHLORIDE mmol/L 107   CO2 mmol/L 29   ANION GAP mmol/L 5   BUN mg/dL 17   CREATININE mg/dL 1 15   EGFR ml/min/1 73sq m 68   CALCIUM mg/dL 9 0     Results from last 7 days   Lab Units 05/21/21  0130   AST U/L 25   ALT U/L 46   ALK PHOS U/L 155*   TOTAL PROTEIN g/dL 7 0   ALBUMIN g/dL 3 7   TOTAL BILIRUBIN mg/dL 1 75*         Results from last 7 days   Lab Units 05/21/21  0130   GLUCOSE RANDOM mg/dL 110       Results from last 7 days   Lab Units 05/21/21  0130 05/17/21  0844   PROTIME seconds 24 6* 28 0*   INR  2 23* 2 69*   PTT seconds 39*  --      Results from last 7 days   Lab Units 05/21/21  0157   CLARITY UA  Clear   COLOR UA  Yellow   SPEC GRAV UA  1 020   PH UA  7 0   GLUCOSE UA mg/dl Negative   KETONES UA mg/dl Negative   BLOOD UA  Negative   PROTEIN UA mg/dl Negative   NITRITE UA  Negative   BILIRUBIN UA  Negative   UROBILINOGEN UA E U /dl 0 2   LEUKOCYTES UA  Negative       ED Treatment:   Medication Administration from 05/21/2021 0014 to 05/21/2021 0559   None         Past Medical History:   Diagnosis Date    Aneurysm of thoracic aorta ([address] )     last assessed 11/20/17    Anxiety     currently on no meds    Cardiac disease     Cholelithiasis     Chronic kidney disease     GERD (gastroesophageal reflux disease)     Headache due to anesthesia     Hyperlipidemia     Hypertension     Irritable bowel syndrome     Kidney stone     Palpitations     PONV (postoperative nausea and vomiting)     Shortness of breath     ? related to acid reflux    Sleep apnea     not sure    Stroke [facility]) 11/2014    TIA (transient ischemic attack)      Present on Admission:   GERD (gastroesophageal reflux disease)   Hypertension   Hyperlipidemia   Atrial fibrillation (HCC)      Admitting Diagnosis:   Coughing up blood [R04 2]  Paroxysmal atrial fibrillation (HCC) [I48 0]  Gastroesophageal reflux disease without esophagitis [K21 9]  History of stroke [Z86 73]  Antiphospholipid antibody with hypercoagulable state ([facility] ) [D68 61]  H/O aortic aneurysm repair [M39 025, Z86 79]  Anticoagulated on Coumadin [Z79 01]  Hemoptysis [R04 2]    Age/Sex: 64 y o  male       Scheduled Medications:  amLODIPine, 5 mg, Oral, Daily  aspirin, 81 mg, Oral, Daily  atorvastatin, 40 mg, Oral, Daily With Dinner  metoprolol succinate, 150 mg, Oral, Daily  pantoprazole, 40 mg, Oral, Daily  warfarin, 5 mg, Oral, Daily (warfarin)      Continuous IV Infusions:     PRN Meds:       None    Network Utilization Review Department  ATTENTION: Please call with any questions or concerns to [phone] and carefully listen to the prompts so that you are directed to the right person  All voicemails are confidential   Marianne Bee all requests for admission clinical reviews, approved or denied determinations and any other requests to dedicated fax number below belonging to the campus where the patient is receiving treatment   List of dedicated fax numbers for the Facilities:  [address] DENIALS (Administrative/Medical Necessity) [phone]   [address] (Maternity/NICU/Pediatrics) [phone]   [address] Robinson Cummings 1277 [phone] [address] [address] [address] [facility] [facility] [phone]

## 2021-05-21 NOTE — H&P
INTERNAL MEDICINE ADMISSION H&P     Name: Mirella Busby   Age & Sex: 64 y o  male   MRN: [id number]  Unit/Bed#: ED 01   Encounter: [id number]  Primary Care Provider: Sydnee Hung MD    Code Status: Level 1 - Full Code  Admission Status: OBSERVATION  Disposition: Patient requires Med/Surg        ASSESSMENT/PLAN     Principal Problem:    Spitting up blood  Active Problems:    Hypertension    GERD (gastroesophageal reflux disease)    Atrial fibrillation ([facility] )    Hyperlipidemia    Anticoagulated on Coumadin      * Spitting up blood  Assessment & Plan  Patient wants 1 episode of spitting up blood yesterday evening  Hgb 14 9, INR 2 2  Patient hemodynamically stable upon presentation to ED  CT chest abdomen pelvis negative for any acute intra thoracic or intra abdominal processes  FOBT negative  Colonoscopy 2020 with 1 polyp, no recent EGD  Poor dentition but no other lesions noted in mouth    Suspect that this episode of bleeding was from oral pharyngeal source, however patient would likely benefit from outpatient GI follow-up for possible EGD       -will monitor patient if he develops any ivan hemoptysis or rebleeding will consult appropriate services  -clear liquid diet  -monitor CBC  -will continue warfarin given resolution of symptoms, stable hemoglobin/hemodynamics, and high risk for CVAs    Anticoagulated on Coumadin  Assessment & Plan  Patient with history of multiple CVAs, AFib and antiphospholipid syndrome  Continue warfarin    Hyperlipidemia  Assessment & Plan  Continue home Lipitor    Atrial fibrillation ([facility] )  Assessment & Plan  -continue warfarin  -continue metoprolol    GERD (gastroesophageal reflux disease)  Assessment & Plan  Continue home Protonix    Hypertension  Assessment & Plan  Continue home metoprolol and amlodipine      VTE Pharmacologic Prophylaxis: Warfarin (Coumadin)  VTE Mechanical Prophylaxis: sequential compression device    CHIEF COMPLAINT     Chief Complaint   Patient presents with    Abdominal Pain     Pt reports coughing up blood that started today and abdominal pain that has been going on for a few days  Hx of TIAs recently hospitalize and put on warfarin      HISTORY OF PRESENT ILLNESS     Patient is a 70-year-old male with a past medical history significant for multiple prior CVAs (residual left-sided tremor, left-sided upper quadrantanopia, most recent CVA may 2021), antiphospholipid syndrome, AFib on warfarin, hypertension, hyperlipidemia, anxiety, obstructive sleep apnea presents for evaluation of spitting up blood  Patient reports arising from sleep earlier this evening and spitting blood into the sink  States it was bright red blood not streaks, states he rinse his mouth out and more bright red blood came out  Reports that for few minutes after that his saliva had pinkish tint to it and subsequently resolved  Patient states he has never had similar episode  Does endorse intermittent dark hard stools every 3 weeks or so  Denies any nose bleeds, fevers, cough, dyspnea, nausea, vomiting, diarrhea, constipation  Of note patient was switched from Eliquis to warfarin during last hospitalization earlier this month due to repeat CVA  ED course: Patient hemodynamically stable upon presentation to ED  CBC revealed hemoglobin of 14 9  INR 2 2  CT chest abdomen pelvis revealed no acute intra-abdominal or intrathoracic abnormalities  FOBT negative    REVIEW OF SYSTEMS     Review of Systems   Constitutional: Negative  HENT: Negative  Eyes: Negative  Respiratory: Negative for apnea, cough, shortness of breath, wheezing and stridor  Cardiovascular: Negative  Gastrointestinal: Negative  Endocrine: Negative  Genitourinary: Negative  Musculoskeletal: Negative  Skin: Negative  Allergic/Immunologic: Negative  Neurological: Negative  Hematological: Negative  Psychiatric/Behavioral: Negative        OBJECTIVE     Vitals:    05/21/21 0037 05/21/21 0200 21 0323 21 0400   BP:  135/69  138/82   Pulse:  56  58   Resp:  16  16   Temp:   98 7 °F (37 1 °C)    TempSrc:   Oral    SpO2:  95%  97%   Weight: 118 kg (260 lb)  118 kg (260 lb 2 3 oz)       Temperature:   Temp (24hrs), Av 7 °F (37 1 °C), Min:98 7 °F (37 1 °C), Max:98 7 °F (37 1 °C)    Temperature: 98 7 °F (37 1 °C)  Intake & Output:  I/O     None        Weights:        Body mass index is 39 55 kg/m²  Weight (last 2 days)     Date/Time   Weight    21 0323   118 (260 14)    21 0037   118 (260)            Physical Exam  Constitutional:       Appearance: He is obese  HENT:      Head: Normocephalic  Mouth/Throat:      Mouth: Mucous membranes are moist       Pharynx: Oropharynx is clear  Comments: Poor dentition, no lesions noted  Neck:      Musculoskeletal: Normal range of motion  Cardiovascular:      Rate and Rhythm: Normal rate  Rhythm irregular  Pulses: Normal pulses  Heart sounds: Normal heart sounds  Pulmonary:      Effort: Pulmonary effort is normal       Breath sounds: Normal breath sounds  Abdominal:      General: Abdomen is flat  Bowel sounds are normal       Palpations: Abdomen is soft  Musculoskeletal: Normal range of motion  Skin:     General: Skin is warm and dry  Neurological:      General: No focal deficit present  Mental Status: He is alert and oriented to person, place, and time  Mental status is at baseline  Psychiatric:         Mood and Affect: Mood normal          Behavior: Behavior normal          Thought Content:  Thought content normal          Judgment: Judgment normal        PAST MEDICAL HISTORY     Past Medical History:   Diagnosis Date    Aneurysm of thoracic aorta ([address] )     last assessed 17    Anxiety     currently on no meds    Cardiac disease     Cholelithiasis     Chronic kidney disease     GERD (gastroesophageal reflux disease)     Headache due to anesthesia     Hyperlipidemia     Hypertension     Irritable bowel syndrome     Kidney stone     Palpitations     PONV (postoperative nausea and vomiting)     Shortness of breath     ? related to acid reflux    Sleep apnea     not sure    Stroke [facility]) 11/2014    TIA (transient ischemic attack)      PAST SURGICAL HISTORY     Past Surgical History:   Procedure Laterality Date    AORTA SURGERY      thoracic - aneurysmorrhaphy    APPENDECTOMY      ASCENDING AORTIC ANEURYSM REPAIR      resolved 8/19/15    CHOLECYSTECTOMY      laparoscopic    CYSTOSCOPY      with removal of object- stent removal    KNEE ARTHROSCOPY Right 1996    with medial meniscus repair    LITHOTRIPSY      renal    NJ FRAGMENT KIDNEY STONE/ ESWL Left 5/17/2018    Procedure: LITHROTRIPSY EXTRACORPORAL SHOCKWAVE (ESWL); Surgeon: Luciana Rubio MD;  Location: [facility];  Service: Urology    THUMB ARTHROSCOPY Right 1976    ligament repair     SOCIAL & FAMILY HISTORY     Social History     Substance and Sexual Activity   Alcohol Use Not Currently     Social History     Substance and Sexual Activity   Drug Use No     Social History     Tobacco Use   Smoking Status Never Smoker   Smokeless Tobacco Never Used   Tobacco Comment    no second hand smoke exposure     Family History   Problem Relation Age of Onset    Diverticulitis Mother         of colon    Nephrolithiasis Mother     Emphysema Father     Nephrolithiasis Sister     Heart attack Maternal Grandfather 72    Breast cancer Paternal Grandmother     Lung cancer Paternal Grandfather     Cancer Family     Coronary artery disease Family     Diabetes Family         sibling    Hypertension Family         sibling    Hernia Family         sibling     LABORATORY DATA     Labs: I have personally reviewed pertinent reports      Results from last 7 days   Lab Units 05/21/21  0130   WBC Thousand/uL 9 13   HEMOGLOBIN g/dL 14 9   HEMATOCRIT % 44 3   PLATELETS Thousands/uL 200   NEUTROS PCT % 60   MONOS PCT % 10      Results from last 7 days   Lab Units 05/21/21  0130   POTASSIUM mmol/L 3 5   CHLORIDE mmol/L 107   CO2 mmol/L 29   BUN mg/dL 17   CREATININE mg/dL 1 15   CALCIUM mg/dL 9 0   ALK PHOS U/L 155*   ALT U/L 46   AST U/L 25              Results from last 7 days   Lab Units 05/21/21  0130 05/17/21  0844   INR  2 23* 2 69*   PTT seconds 39*  --              Micro:  Lab Results   Component Value Date    URINECX <10,000 cfu/ml  04/27/2021    URINECX No Growth <1000 cfu/mL 01/25/2021    URINECX <10,000 cfu/ml  10/19/2020     IMAGING & DIAGNOSTIC TESTS     Imaging: I have personally reviewed pertinent reports  Ct Chest Abdomen Pelvis W Contrast    Result Date: 5/21/2021  Impression: No acute intra-abdominal abnormality  No free air or free fluid  No infiltrate or pleural effusion  No acute intrathoracic abnormality  Workstation performed: [id number]     EKG, Pathology, and Other Studies: I have personally reviewed pertinent reports  ALLERGIES     Allergies   Allergen Reactions    Losartan Angioedema    Bactrim [Sulfamethoxazole-Trimethoprim]     Eliquis [Apixaban] Other (See Comments)     Failed  Had embolic CVA    Lisinopril      Felt bad, was OK with Zestril brand name   Tramadol      MEDICATIONS PRIOR TO ARRIVAL     Prior to Admission medications    Medication Sig Start Date End Date Taking?  Authorizing Provider   amLODIPine (NORVASC) 5 mg tablet TAKE 1 TABLET BY MOUTH EVERY DAY 5/20/21   Ayush Burrows MD   aspirin (ECOTRIN LOW STRENGTH) 81 mg EC tablet Take 1 tablet (81 mg total) by mouth daily 3/27/20   Bo Nuñez MD   atorvastatin (LIPITOR) 40 mg tablet TAKE 1 TABLET BY MOUTH DAILY WITH DINNER 3/24/21   Ayush Burrows MD   cholecalciferol (VITAMIN D3) 1,000 units tablet Take 1 tablet (1,000 Units total) by mouth daily 3/30/20 5/6/21  Ayush Burrows MD   metoprolol succinate (TOPROL-XL) 50 mg 24 hr tablet TAKE 3 TABLETS BY MOUTH EVERY DAY 1/14/21   Ayush Burrows MD   pantoprazole (PROTONIX) 40 mg tablet TAKE 1 TABLET BY MOUTH EVERY DAY 3/31/21   Eddie Dobbins MD   warfarin (COUMADIN) 5 mg tablet Take 1 tablet (5 mg total) by mouth daily 5/4/21   Britt Díaz PA-C     MEDICATIONS ADMINISTERED IN LAST 24 HOURS     Medication Administration - last 24 hours from 05/20/2021 0531 to 05/21/2021 0531       Date/Time Order Dose Route Action Action by     05/21/2021 0245 iohexol (OMNIPAQUE) 350 MG/ML injection (MULTI-DOSE) 100 mL 100 mL Intravenous Given Alen Homans        CURRENT MEDICATIONS     No current facility-administered medications for this encounter  Admission Time  I spent 30 minutes admitting the patient  This involved direct patient contact where I performed a full history and physical, reviewing previous records, and reviewing laboratory and other diagnostic studies  Portions of the record may have been created with voice recognition software  Occasional wrong word or "sound a like" substitutions may have occurred due to the inherent limitations of voice recognition software    Read the chart carefully and recognize, using context, where substitutions have occurred     ==  Ikng Bowels, [address] [facility]  Internal Medicine Residency PGY-3

## 2021-05-24 ENCOUNTER — LAB (OUTPATIENT)
Dept: LAB | Facility: HOSPITAL | Age: 61
End: 2021-05-24
Payer: COMMERCIAL

## 2021-05-24 ENCOUNTER — TRANSITIONAL CARE MANAGEMENT (OUTPATIENT)
Dept: FAMILY MEDICINE CLINIC | Facility: CLINIC | Age: 61
End: 2021-05-24

## 2021-05-24 ENCOUNTER — ANTICOAG VISIT (OUTPATIENT)
Dept: FAMILY MEDICINE CLINIC | Facility: CLINIC | Age: 61
End: 2021-05-24

## 2021-05-24 ENCOUNTER — TELEPHONE (OUTPATIENT)
Dept: FAMILY MEDICINE CLINIC | Facility: CLINIC | Age: 61
End: 2021-05-24

## 2021-05-24 NOTE — TELEPHONE ENCOUNTER
Spoke to Geraldine Bundy in reference to his INR results and recommendations  Pt wanted to advised that over the weekend he was in the ED for spitting up blood, pt state he was recommended to follow up with a PI (internal medicine doc)  He wont be seen until mid June  Pt states this morning his left eye is red patched  He will like to know if any other recommendation from Dr Rodolfo Lemos  Please advise

## 2021-05-24 NOTE — TELEPHONE ENCOUNTER
Patient needs T cm visit as he was admitted to the hospital for hemoptysis  The hospital discharge mentioned that he should follow with his primary care, as well as Gastroenterology  He does not need to see internal medicine  The only other specialist would be Cardiology  GI is new        Please look to see if patient is on the T cm list

## 2021-05-25 ENCOUNTER — TELEPHONE (OUTPATIENT)
Dept: NEUROLOGY | Facility: CLINIC | Age: 61
End: 2021-05-25

## 2021-05-25 NOTE — TELEPHONE ENCOUNTER
21-30 Day Post CVA Discharge Follow Up    Hospitalization: 5/1/2021 - 5/4/2021  The purpose of this phone call is to assess patient's general wellbeing or for any assistance needed with follow-up care  Called [phone] with no answer  Left message on voicemail box for call back  Called [phone], reached patient, I introduced myself and explained neurovascular nurse navigator role  Since discharge, he denies experiencing any new or worsening stroke-like symptoms  Patient denies the presence of any residual stroke symptoms following hospitalization  Patient was treated at [facility] on 5/21 for bleeding  At this time, he reports that he has a red spot on his left eye, not impeding vision  No longer coughing up blood but feels as though he can taste it  No blood in the stool and urine  Ambulation / ADLs:  he is ambulating independently as well as preforming his own ADLs  Patient manages his own medications, appointments, and affairs  Appointments / Medication Review:  Reviewed appointments - Scheduled with PCP 6/4, cardiology 6/9, GI 6/22  Offered to reschedule appt with Dr Susy Roy, he is agreeable to this  I rescheduled to 8/11  I reviewed medications with him  There have not been any medication changes since discharge from the hospital  Reports having no difficulties obtaining medications  Reports he is taking all as prescribed with no missed doses or medication side effects  Risk Factors / Education:  Patient verbalizes understanding of stroke type, symptoms, personal risk factors and management, medications, and resources  As for risk factors, patient reports they have been managing modifiable risk factors in the the following ways:he is a non smoker  States he continues to try and better his diet  I addressed all his questions  At the conclusion of the conversation, patient denies having any further questions or concerns

## 2021-05-27 ENCOUNTER — RA CDI HCC (OUTPATIENT)
Dept: OTHER | Facility: HOSPITAL | Age: 61
End: 2021-05-27

## 2021-05-27 NOTE — PROGRESS NOTES
[facility]  coding opportunities          Chart reviewed, no opportunity found: CHART REVIEWED, NO OPPORTUNITY FOUND              Patients insurance company: [employer] (Medicare Advantage and Commercial)

## 2021-06-01 ENCOUNTER — ANTICOAG VISIT (OUTPATIENT)
Dept: FAMILY MEDICINE CLINIC | Facility: CLINIC | Age: 61
End: 2021-06-01

## 2021-06-01 ENCOUNTER — LAB (OUTPATIENT)
Dept: LAB | Facility: HOSPITAL | Age: 61
End: 2021-06-01
Payer: COMMERCIAL

## 2021-06-01 LAB — INR PPP: 1.59 (ref 0.84–1.19)

## 2021-06-03 ENCOUNTER — LAB (OUTPATIENT)
Dept: LAB | Facility: HOSPITAL | Age: 61
End: 2021-06-03
Payer: COMMERCIAL

## 2021-06-03 ENCOUNTER — ANTICOAG VISIT (OUTPATIENT)
Dept: FAMILY MEDICINE CLINIC | Facility: CLINIC | Age: 61
End: 2021-06-03

## 2021-06-03 DIAGNOSIS — I48.0 PAROXYSMAL ATRIAL FIBRILLATION (HCC): ICD-10-CM

## 2021-06-03 DIAGNOSIS — Z79.01 ANTICOAGULATED ON COUMADIN: ICD-10-CM

## 2021-06-03 DIAGNOSIS — I63.9 ACUTE CVA (CEREBROVASCULAR ACCIDENT) (HCC): ICD-10-CM

## 2021-06-03 LAB
INR PPP: 1.71 (ref 0.84–1.19)
PROTHROMBIN TIME: 19.7 SECONDS (ref 11.6–14.5)

## 2021-06-03 PROCEDURE — 36415 COLL VENOUS BLD VENIPUNCTURE: CPT

## 2021-06-03 PROCEDURE — 85610 PROTHROMBIN TIME: CPT

## 2021-06-04 ENCOUNTER — OFFICE VISIT (OUTPATIENT)
Dept: FAMILY MEDICINE CLINIC | Facility: CLINIC | Age: 61
End: 2021-06-04
Payer: COMMERCIAL

## 2021-06-04 VITALS
HEIGHT: 68 IN | HEART RATE: 56 BPM | DIASTOLIC BLOOD PRESSURE: 76 MMHG | SYSTOLIC BLOOD PRESSURE: 130 MMHG | BODY MASS INDEX: 38.65 KG/M2 | WEIGHT: 255 LBS

## 2021-06-04 DIAGNOSIS — K21.9 GASTROESOPHAGEAL REFLUX DISEASE WITHOUT ESOPHAGITIS: ICD-10-CM

## 2021-06-04 DIAGNOSIS — Z79.01 ANTICOAGULATED ON COUMADIN: ICD-10-CM

## 2021-06-04 DIAGNOSIS — R04.2 SPITTING UP BLOOD: Primary | ICD-10-CM

## 2021-06-04 DIAGNOSIS — I10 ESSENTIAL HYPERTENSION: ICD-10-CM

## 2021-06-04 DIAGNOSIS — I48.0 PAROXYSMAL ATRIAL FIBRILLATION (HCC): ICD-10-CM

## 2021-06-04 DIAGNOSIS — I63.9 STROKE (HCC): ICD-10-CM

## 2021-06-04 DIAGNOSIS — I63.9 ACUTE CVA (CEREBROVASCULAR ACCIDENT) (HCC): ICD-10-CM

## 2021-06-04 PROCEDURE — 1036F TOBACCO NON-USER: CPT | Performed by: FAMILY MEDICINE

## 2021-06-04 PROCEDURE — 99214 OFFICE O/P EST MOD 30 MIN: CPT | Performed by: FAMILY MEDICINE

## 2021-06-04 PROCEDURE — 1111F DSCHRG MED/CURRENT MED MERGE: CPT | Performed by: FAMILY MEDICINE

## 2021-06-04 RX ORDER — FAMOTIDINE 40 MG/1
40 TABLET, FILM COATED ORAL DAILY
Qty: 30 TABLET | Refills: 5 | Status: SHIPPED | OUTPATIENT
Start: 2021-06-04 | End: 2021-11-29

## 2021-06-04 RX ORDER — WARFARIN SODIUM 5 MG/1
5 TABLET ORAL
Qty: 30 TABLET | Refills: 5 | Status: SHIPPED | OUTPATIENT
Start: 2021-06-04 | End: 2021-09-01 | Stop reason: SDUPTHER

## 2021-06-04 NOTE — ASSESSMENT & PLAN NOTE
Patient this point seems to be doing well  No changes are needed at the moment  I would recommend follow-up with Neurology in the future as scheduled

## 2021-06-04 NOTE — ASSESSMENT & PLAN NOTE
Patient did have episode of spitting up blood in the hospital, however that seems to be resolved at this point  Question if this is related to reflux, or postnasal drip and blood from the nose, but in either case I do agree with gastroenterology  Need to continue to monitor the INR

## 2021-06-04 NOTE — PATIENT INSTRUCTIONS
Problem List Items Addressed This Visit     Acute CVA (cerebrovascular accident) [facility])     Patient this point seems to be doing well  No changes are needed at the moment  I would recommend follow-up with Neurology in the future as scheduled  Anticoagulated on Coumadin     Patient continues on Coumadin  INR was slightly variable lately  He is scheduled for repeat next week  Atrial fibrillation [facility])     Patient is doing relatively well with the atrial fibrillation  Follow-up with Cardiology  Currently has loop recorder, and is due for interrogation and evaluation with Cardiology  Remains on Coumadin secondary to failure with Eliquis and Xarelto  GERD (gastroesophageal reflux disease)     Patient's reflux is worse than what it was  This may be part of the cause of coughing up blood  Agree with GI evaluation  Patient did mention that he does have some times where he has black stools  They are not tarry, however  I would recommend that we add Pepcid to his current regimen, famotidine generic  At this point, would be 40 mg once a day  Continue on pantoprazole as well  Relevant Medications    famotidine (PEPCID) 40 MG tablet    Hypertension     Blood pressure today is reasonable  No changes  Spitting up blood - Primary     Patient did have episode of spitting up blood in the hospital, however that seems to be resolved at this point  Question if this is related to reflux, or postnasal drip and blood from the nose, but in either case I do agree with gastroenterology  Need to continue to monitor the INR  Other Visit Diagnoses     Stroke [facility])        Relevant Medications    warfarin (COUMADIN) 5 mg tablet          COVID 19 Instructions    Aurelio Gamino Record was advised to limit contact with others to essential tasks such as getting food, medications, and medical care  Proper handwashing reviewed, and Hand sanitzer when washing is not available      If the patient develops symptoms of COVID 19, the patient should call the office as soon as possible  For [id number] Flu season, it is strongly recommended that Flu Vaccinations be obtained  Virtual Visits may be conducted in several ways: Cr: You should get a text message when the provider is ready to see you  Click on the link in the text message, and the call should start  You will need to type in your name, and allow camera and microphone access  This is HIPPA compliant, and secure  Please try to download [employer] Duo  Once you do download this on your phone, you will be prompted to add your phone number to the account  After that, he should receive a text from [facility], and use that code to verify your phone number  After that, you should be able to use Google Duo to receive and make video calls  Please download Microsoft Teams to your phone or computer  You would get an email with the meeting after scheduling with the office  You will Join the meeting, and wait there till the provider joins as well  Instructions for downloading this are available from the office  This is HIPPA compliant, and secure  We are committed to getting you vaccinated as soon as possible and will be closely following CDC and [facility]  guidelines as they are released and revised  Please refer to our COVID-19 vaccine webpage for the most up to date information on the vaccine and our distribution efforts  [employer] tn    OUR NEW LOCATION:  Starting around 28June2021, our new location and phone are:    [address], [address] [place] [address], [address]  Fax: [phone]    Lab services and OB/GYN will be at this location as well

## 2021-06-04 NOTE — ASSESSMENT & PLAN NOTE
Patient is doing relatively well with the atrial fibrillation  Follow-up with Cardiology  Currently has loop recorder, and is due for interrogation and evaluation with Cardiology  Remains on Coumadin secondary to failure with Eliquis and Xarelto

## 2021-06-04 NOTE — ASSESSMENT & PLAN NOTE
Patient's reflux is worse than what it was  This may be part of the cause of coughing up blood  Agree with GI evaluation  Patient did mention that he does have some times where he has black stools  They are not tarry, however  I would recommend that we add Pepcid to his current regimen, famotidine generic  At this point, would be 40 mg once a day  Continue on pantoprazole as well

## 2021-06-04 NOTE — ASSESSMENT & PLAN NOTE
Patient continues on Coumadin  INR was slightly variable lately  He is scheduled for repeat next week

## 2021-06-07 ENCOUNTER — ANTICOAG VISIT (OUTPATIENT)
Dept: FAMILY MEDICINE CLINIC | Facility: CLINIC | Age: 61
End: 2021-06-07

## 2021-06-07 ENCOUNTER — LAB (OUTPATIENT)
Dept: LAB | Facility: HOSPITAL | Age: 61
End: 2021-06-07
Payer: COMMERCIAL

## 2021-06-07 DIAGNOSIS — Z79.01 ANTICOAGULATED ON COUMADIN: ICD-10-CM

## 2021-06-07 DIAGNOSIS — I63.9 ACUTE CVA (CEREBROVASCULAR ACCIDENT) (HCC): ICD-10-CM

## 2021-06-07 DIAGNOSIS — I48.0 PAROXYSMAL ATRIAL FIBRILLATION (HCC): ICD-10-CM

## 2021-06-07 LAB
INR PPP: 2.18 (ref 0.84–1.19)
PROTHROMBIN TIME: 23.8 SECONDS (ref 11.6–14.5)

## 2021-06-07 PROCEDURE — 85610 PROTHROMBIN TIME: CPT

## 2021-06-07 PROCEDURE — 36415 COLL VENOUS BLD VENIPUNCTURE: CPT

## 2021-06-09 ENCOUNTER — TELEPHONE (OUTPATIENT)
Dept: CARDIAC SURGERY | Facility: CLINIC | Age: 61
End: 2021-06-09

## 2021-06-09 ENCOUNTER — REMOTE DEVICE CLINIC VISIT (OUTPATIENT)
Dept: CARDIOLOGY CLINIC | Facility: CLINIC | Age: 61
End: 2021-06-09
Payer: COMMERCIAL

## 2021-06-09 DIAGNOSIS — Z95.818 PRESENCE OF OTHER CARDIAC IMPLANTS AND GRAFTS: Primary | ICD-10-CM

## 2021-06-09 PROCEDURE — G2066 INTER DEVC REMOTE 30D: HCPCS | Performed by: INTERNAL MEDICINE

## 2021-06-09 PROCEDURE — 93298 REM INTERROG DEV EVAL SCRMS: CPT | Performed by: INTERNAL MEDICINE

## 2021-06-09 NOTE — TELEPHONE ENCOUNTER
Message left with follow up appointment information  He does not need to have testing done prior to appointment   CT CAP done on 5/21/21 is ok to use for his appointment scheduled for 6/18 @ 10 am w/ Dr Mellissa Ruiz

## 2021-06-09 NOTE — PROGRESS NOTES
CARELINK TRANSMISSION: LOOP RECORDER  PRESENTING RHYTHM VS @ 66 BPM  BATTERY STATUS "OK " 1 TACHY EPISODE W/ EGRAM SHOWING OVERSENSING  5 AF EPISODES W/ EGRAMS SUGGESTING SR W/ PVCs [ BIGEMINAL AT TIMES]  CAN NOT R/O AF  AF BURDEN = 0%  NO PATIENT OR DEVICE ACTIVATED EPISODES  NORMAL DEVICE FUNCTION   DL

## 2021-06-15 ENCOUNTER — ANTICOAG VISIT (OUTPATIENT)
Dept: FAMILY MEDICINE CLINIC | Facility: CLINIC | Age: 61
End: 2021-06-15

## 2021-06-15 ENCOUNTER — APPOINTMENT (OUTPATIENT)
Dept: LAB | Facility: HOSPITAL | Age: 61
End: 2021-06-15
Payer: COMMERCIAL

## 2021-06-15 DIAGNOSIS — Z79.01 ANTICOAGULATED ON COUMADIN: ICD-10-CM

## 2021-06-15 DIAGNOSIS — I63.9 ACUTE CVA (CEREBROVASCULAR ACCIDENT) (HCC): ICD-10-CM

## 2021-06-15 DIAGNOSIS — I48.0 PAROXYSMAL ATRIAL FIBRILLATION (HCC): ICD-10-CM

## 2021-06-15 LAB
INR PPP: 1.85 (ref 0.84–1.19)
PROTHROMBIN TIME: 21 SECONDS (ref 11.6–14.5)

## 2021-06-15 PROCEDURE — 85610 PROTHROMBIN TIME: CPT

## 2021-06-15 PROCEDURE — 36415 COLL VENOUS BLD VENIPUNCTURE: CPT

## 2021-06-18 ENCOUNTER — OFFICE VISIT (OUTPATIENT)
Dept: CARDIAC SURGERY | Facility: CLINIC | Age: 61
End: 2021-06-18
Payer: COMMERCIAL

## 2021-06-18 VITALS
TEMPERATURE: 97.3 F | DIASTOLIC BLOOD PRESSURE: 80 MMHG | HEIGHT: 68 IN | OXYGEN SATURATION: 95 % | RESPIRATION RATE: 18 BRPM | HEART RATE: 54 BPM | SYSTOLIC BLOOD PRESSURE: 136 MMHG | WEIGHT: 255.6 LBS | BODY MASS INDEX: 38.74 KG/M2

## 2021-06-18 DIAGNOSIS — Z86.79 S/P ASCENDING AORTIC ANEURYSM REPAIR: Primary | ICD-10-CM

## 2021-06-18 DIAGNOSIS — Z98.890 S/P ASCENDING AORTIC ANEURYSM REPAIR: Primary | ICD-10-CM

## 2021-06-18 DIAGNOSIS — G47.33 OSA (OBSTRUCTIVE SLEEP APNEA): ICD-10-CM

## 2021-06-18 DIAGNOSIS — E66.01 CLASS 2 SEVERE OBESITY DUE TO EXCESS CALORIES WITH SERIOUS COMORBIDITY AND BODY MASS INDEX (BMI) OF 38.0 TO 38.9 IN ADULT (HCC): ICD-10-CM

## 2021-06-18 DIAGNOSIS — I71.2 THORACIC AORTIC ANEURYSM WITHOUT RUPTURE (HCC): ICD-10-CM

## 2021-06-18 PROCEDURE — 3008F BODY MASS INDEX DOCD: CPT | Performed by: FAMILY MEDICINE

## 2021-06-18 PROCEDURE — 99204 OFFICE O/P NEW MOD 45 MIN: CPT | Performed by: PHYSICIAN ASSISTANT

## 2021-06-18 NOTE — PROGRESS NOTES
Aortic Surveillance - Cardiothoracic Surgery   Flor Fried 64 y o  male MRN: [id number]    History of Present Illness: Jt Daily is a 64y o  year old male known to our office for having s/p replacement of ascending aorta with hemiarch reconstruction 11/6/2014 by Dr Stephen Davis  The patient was last seen in our office for aortic surveillance in 2019  He reports back to the office today for follow up with a CT chest abdomen pelvis w contrast  Unfortunately since he last visit here 2 years ago he had CVAs with some residual eye deficits and overall weakness  He lives at home with his sister  He had a NGUYỄN on his hospital admission as well  He has limited mobility at home without any weight loss since his last visit in the office  Past Medical History:  Past Medical History:   Diagnosis Date    Aneurysm of thoracic aorta ([facility] [address] )     last assessed 11/20/17    Anxiety     currently on no meds    Cardiac disease     Cholelithiasis     Chronic kidney disease     GERD (gastroesophageal reflux disease)     Headache due to anesthesia     Hyperlipidemia     Hypertension     Irritable bowel syndrome     Kidney stone     Palpitations     PONV (postoperative nausea and vomiting)     Shortness of breath     ? related to acid reflux    Sleep apnea     not sure    Stroke [facility]) 11/2014    Stroke ([address] ) 03/2021    TIA (transient ischemic attack)      Past Surgical History:   Past Surgical History:   Procedure Laterality Date    AORTA SURGERY      thoracic - aneurysmorrhaphy    APPENDECTOMY      ASCENDING AORTIC ANEURYSM REPAIR      resolved 8/19/15    CHOLECYSTECTOMY      laparoscopic    CYSTOSCOPY      with removal of object- stent removal    KNEE ARTHROSCOPY Right 1996    with medial meniscus repair    LITHOTRIPSY      renal    SD FRAGMENT KIDNEY STONE/ ESWL Left 5/17/2018    Procedure: LITHROTRIPSY EXTRACORPORAL SHOCKWAVE (ESWL);   Surgeon: Luciana Rubio MD;  Location: [facility]; Service: Urology    THUMB ARTHROSCOPY Right 1976    ligament repair       Family History:  Family History   Problem Relation Age of Onset    Diverticulitis Mother         of colon    Nephrolithiasis Mother     Emphysema Father     Nephrolithiasis Sister     Heart attack Maternal Grandfather 72    Breast cancer Paternal Grandmother     Lung cancer Paternal Grandfather     Cancer Family     Coronary artery disease Family     Diabetes Family         sibling    Hypertension Family         sibling    Hernia Family         sibling     Social History:    Social History     Substance and Sexual Activity   Alcohol Use Not Currently     Social History     Substance and Sexual Activity   Drug Use No     Social History     Tobacco Use   Smoking Status Never Smoker   Smokeless Tobacco Never Used   Tobacco Comment    no second hand smoke exposure       Home Medications:   Prior to Admission medications    Medication Sig Start Date End Date Taking? Authorizing Provider   amLODIPine (NORVASC) 5 mg tablet TAKE 1 TABLET BY MOUTH EVERY DAY 5/20/21  Yes Jatin Gutierrez MD   aspirin (ECOTRIN LOW STRENGTH) 81 mg EC tablet Take 1 tablet (81 mg total) by mouth daily 3/27/20  Yes Gina Preston MD   atorvastatin (LIPITOR) 40 mg tablet TAKE 1 TABLET BY MOUTH DAILY WITH DINNER 3/24/21  Yes Jatin Gutierrez MD   cholecalciferol (VITAMIN D3) 1,000 units tablet Take 1 tablet (1,000 Units total) by mouth daily 3/30/20 6/18/21 Yes Jatin Gutierrez MD   famotidine (PEPCID) 40 MG tablet Take 1 tablet (40 mg total) by mouth daily 6/4/21  Yes Jatin Gutierrez MD   metoprolol succinate (TOPROL-XL) 50 mg 24 hr tablet TAKE 3 TABLETS BY MOUTH EVERY DAY 1/14/21  Yes Jatin Gutierrez MD   pantoprazole (PROTONIX) 40 mg tablet TAKE 1 TABLET BY MOUTH EVERY DAY 3/31/21  Yes Jatin Gutierrez MD   warfarin (COUMADIN) 5 mg tablet Take 1 tablet (5 mg total) by mouth daily 6/4/21  Yes Jatin Gutierrez MD       Allergies:   Allergies   Allergen Reactions    Losartan Angioedema    Bactrim [Sulfamethoxazole-Trimethoprim]     Eliquis [Apixaban] Other (See Comments)     Failed  Had embolic CVA    Lisinopril      Felt bad, was OK with Zestril brand name   Tramadol        Review of Systems:     Review of Systems   Constitutional: Negative  HENT: Negative  Eyes: Negative  Respiratory: Negative  Cardiovascular: Negative  Gastrointestinal: Negative  Endocrine: Negative  Genitourinary: Negative  Musculoskeletal: Negative  Skin: Negative  Allergic/Immunologic: Negative  Neurological:        Vision deficits since CVA with overall deconditioning   Hematological: Negative  Psychiatric/Behavioral: Negative  Vital Signs:     Vitals:    06/18/21 0951 06/18/21 0957   BP: 136/86 136/80   BP Location: Left arm Right arm   Patient Position: Sitting    Cuff Size: Standard    Pulse: (!) 54    Resp: 18    Temp: (!) 97 3 °F (36 3 °C)    TempSrc: Tympanic    SpO2: 95%    Weight: 116 kg (255 lb 9 6 oz)    Height: 5' 8" (1 727 m)        Physical Exam:     Physical Exam  Constitutional:       General: He is not in acute distress  Appearance: Normal appearance  He is obese  He is not toxic-appearing  HENT:      Head: Normocephalic  Right Ear: External ear normal       Left Ear: External ear normal       Nose: Nose normal       Mouth/Throat:      Mouth: Mucous membranes are moist       Pharynx: Oropharynx is clear  Eyes:      General:         Right eye: No discharge  Left eye: Discharge present  Extraocular Movements: Extraocular movements intact  Conjunctiva/sclera: Conjunctivae normal       Pupils: Pupils are equal, round, and reactive to light  Neck:      Vascular: No carotid bruit  Cardiovascular:      Rate and Rhythm: Normal rate and regular rhythm  Pulses: Normal pulses  Heart sounds: No murmur heard          Comments: Sternotomy scar barely visible, sternum stable  Pulmonary:      Effort: Pulmonary effort is normal       Breath sounds: Normal breath sounds  Abdominal:      General: Abdomen is flat  Bowel sounds are normal  There is no distension  Palpations: Abdomen is soft  Tenderness: There is no abdominal tenderness  There is no guarding  Musculoskeletal:         General: No tenderness  Normal range of motion  Cervical back: Normal range of motion and neck supple  Right lower leg: No edema  Left lower leg: No edema  Skin:     General: Skin is warm and dry  Coloration: Skin is not pale  Findings: No erythema or rash  Neurological:      General: No focal deficit present  Mental Status: He is alert and oriented to person, place, and time  Cranial Nerves: No cranial nerve deficit  Motor: No weakness  Gait: Gait normal    Psychiatric:         Mood and Affect: Mood normal          Behavior: Behavior normal          Thought Content: Thought content normal          Judgment: Judgment normal          Lab Results:               Invalid input(s): LABGLOM  Results from last 7 days   Lab Units 06/15/21  1055   INR  1 85*     Lab Results   Component Value Date    HGBA1C 5 3 05/02/2021     Lab Results   Component Value Date    TROPONINI <0 02 03/13/2020       Imaging Studies:     CT Chest:   FINDINGS:     CHEST     LUNGS:  Lungs are clear  There is no tracheal or endobronchial lesion      PLEURA:  Unremarkable      HEART/GREAT VESSELS:  The heart is not enlarged and there is no pericardial effusion  Stable postoperative changes of the ascending thoracic aorta are present      MEDIASTINUM AND ELLA:  Unremarkable      CHEST WALL AND LOWER NECK:   Unremarkable      ABDOMEN     LIVER/BILIARY TREE:  Unremarkable      GALLBLADDER:  Gallbladder is surgically absent      SPLEEN:  There is a 3 mm hypodensity likely representing a tiny cyst      PANCREAS:  Unremarkable      ADRENAL GLANDS:  Unremarkable      KIDNEYS/URETERS:  No hydronephrosis or urinary tract calculus    One or more sharply circumscribed subcentimeter renal hypodensities are present, too small to accurately characterize, and statistically most likely benign findings  According to recent   literature (Radiology 2019) no further workup of these findings is recommended      STOMACH AND BOWEL:  There is a small sliding hilum hernia      APPENDIX:  No findings to suggest appendicitis      ABDOMINOPELVIC CAVITY:  No ascites  No pneumoperitoneum  No lymphadenopathy      VESSELS:  Unremarkable for patient's age      PELVIS     REPRODUCTIVE ORGANS:  Unremarkable for patient's age      URINARY BLADDER:  Unremarkable      ABDOMINAL WALL/INGUINAL REGIONS:  There are small bilateral fat-containing inguinal hernias      OSSEOUS STRUCTURES:  No acute fracture or destructive osseous lesion  Postoperative changes of prior median sternotomy are present      IMPRESSION:     No acute intra-abdominal abnormality  No free air or free fluid      No infiltrate or pleural effusion  No acute intrathoracic abnormality  NGUYỄN  LEFT VENTRICLE: Size was normal  Systolic function was normal  Ejection fraction was estimated to be 60 %  Although no diagnostic regional wall motion abnormality was identified, this possibility cannot be completely excluded on the basis  of this study  Wall thickness was mildly increased  There was mild concentric hypertrophy  DOPPLER: Left ventricular diastolic function parameters were normal      RIGHT VENTRICLE: The size was normal  Systolic function was normal  Wall thickness was normal      LEFT ATRIUM: Size was normal  No thrombus was identified  APPENDAGE: The size was normal  No thrombus was identified  DOPPLER: The function was normal (normal emptying velocity)      ATRIAL SEPTUM: No defect or patent foramen ovale was identified      RIGHT ATRIUM: Size was normal  No thrombus was identified      MITRAL VALVE: Valve structure was normal  There was normal leaflet separation   DOPPLER: There was mild regurgitation      AORTIC VALVE: The valve was bicuspid with right and left coronary cusp fusion  Leaflets exhibited moderately increased thickness  DOPPLER: Transaortic velocity was increased due to valvular stenosis  There was mild stenosis  There was no  significant regurgitation      TRICUSPID VALVE: The valve structure was normal  There was normal leaflet separation  DOPPLER: There was mild regurgitation      PULMONIC VALVE: DOPPLER: There was no significant regurgitation      PERICARDIUM: There was no pericardial effusion  The pericardium was normal in appearance      AORTA: The root exhibited normal size  There was no atheroma  There was no evidence for dissection  There was no evidence for aneurysm        I have personally reviewed pertinent reports  Assessment:  Patient Active Problem List    Diagnosis Date Noted    Spitting up blood 05/21/2021    Anticoagulated on Coumadin 05/06/2021    CORIE (obstructive sleep apnea)     Hyperbilirubinemia 08/10/2020    Antiphospholipid antibody with hypercoagulable state ([facility] [address] ) 06/03/2020    Vision changes 04/09/2020    History of stroke 03/13/2020    [facility] 01/31/2020    Other viral warts 06/26/2019    Physical deconditioning 06/26/2019    Obesity 05/02/2018    BRBPR (bright red blood per rectum) 11/10/2017    Numbness 08/26/2017    H/O aortic aneurysm repair 08/26/2017    Hypertension 08/26/2017    GERD (gastroesophageal reflux disease) 08/26/2017    Hyperlipidemia 03/17/2017    Peyronie disease 12/09/2016    Vitamin D deficiency 06/15/2016    Atrial fibrillation ([facility] [address] ) 11/17/2014    Anxiety disorder 11/11/2014    Panic attack 10/31/2014    Constipation 09/19/2014    Irritable bowel syndrome 04/24/2014     Plan:  Stable postoperative aortic scan  Arrangements have been made for future surveillance to be completed with CTA chest/abdomen/pelvis in 2 Years  Weight management referral given to patient      Sylvester Mcduffie Rosy Reid was comfortable with our recommendations, and their questions were answered to their satisfaction  Thank you for allowing us to participate in the care of this patient  Aortic Aneurysm Instructions were provided to the patient as follows:    1  No lifting more than 50 pounds  2  Maintain a controlled blood pressure with a goal of 120/80  3  Follow up in Aortic Clinic as recommended with radiology follow up as instructed  4  Report to the ER or call 911 immediately with the following signs / symptoms: sudden onset of back pain, chest pain or shortness of breath      SIGNATURE: Jose Allred  DATE: June 18, 2021  TIME: 10:12 AM

## 2021-06-18 NOTE — LETTER
June 18, 2021     Todd Weber, [address]    Patient: Ophelia Becker   YOB: 1960   Date of Visit: 6/18/2021       Dear Dr Kate Pearson: Thank you for referring Imer Pro to me for evaluation  Below are my notes for this consultation  If you have questions, please do not hesitate to call me  I look forward to following your patient along with you  Sincerely,        Norma Howe MD        CC: No Recipients  Tere Reyes  6/18/2021 10:39 AM  Attested  Aortic Surveillance - Cardiothoracic Surgery   Jarett Fried 64 y o  male MRN: [id number]    History of Present Illness: Ophelia Becker is a 64y o  year old male known to our office for having s/p replacement of ascending aorta with hemiarch reconstruction 11/6/2014 by Dr Anastacio Osborn  The patient was last seen in our office for aortic surveillance in 2019  He reports back to the office today for follow up with a CT chest abdomen pelvis w contrast  Unfortunately since he last visit here 2 years ago he had CVAs with some residual eye deficits and overall weakness  He lives at home with his sister  He had a NGUYỄN on his hospital admission as well  He has limited mobility at home without any weight loss since his last visit in the office       Past Medical History:  Past Medical History:   Diagnosis Date    [facility]     last assessed 11/20/17    Anxiety     currently on no meds    Cardiac disease     Cholelithiasis     Chronic kidney disease     GERD (gastroesophageal reflux disease)     Headache due to anesthesia     Hyperlipidemia     Hypertension     Irritable bowel syndrome     Kidney stone     Palpitations     PONV (postoperative nausea and vomiting)     Shortness of breath     ? related to acid reflux    Sleep apnea     not sure    Stroke [facility]) 11/2014    Stroke ([facility] ) 03/2021    TIA (transient ischemic attack)      Past Surgical History: Past Surgical History:   Procedure Laterality Date    AORTA SURGERY      thoracic - aneurysmorrhaphy    APPENDECTOMY      ASCENDING AORTIC ANEURYSM REPAIR      resolved 8/19/15    CHOLECYSTECTOMY      laparoscopic    CYSTOSCOPY      with removal of object- stent removal    KNEE ARTHROSCOPY Right 1996    with medial meniscus repair    LITHOTRIPSY      renal    PA FRAGMENT KIDNEY STONE/ ESWL Left 5/17/2018    Procedure: LITHROTRIPSY EXTRACORPORAL SHOCKWAVE (ESWL); Surgeon: Tc Browne MD;  Location: [facility];  Service: Urology    THUMB ARTHROSCOPY Right 1976    ligament repair       Family History:  Family History   Problem Relation Age of Onset    Diverticulitis Mother         of colon    Nephrolithiasis Mother     Emphysema Father     Nephrolithiasis Sister     Heart attack Maternal Grandfather 72    Breast cancer Paternal Grandmother     Lung cancer Paternal Grandfather     Cancer Family     Coronary artery disease Family     Diabetes Family         sibling    Hypertension Family         sibling    Hernia Family         sibling     Social History:    Social History     Substance and Sexual Activity   Alcohol Use Not Currently     Social History     Substance and Sexual Activity   Drug Use No     Social History     Tobacco Use   Smoking Status Never Smoker   Smokeless Tobacco Never Used   Tobacco Comment    no second hand smoke exposure       Home Medications:   Prior to Admission medications    Medication Sig Start Date End Date Taking?  Authorizing Provider   amLODIPine (NORVASC) 5 mg tablet TAKE 1 TABLET BY MOUTH EVERY DAY 5/20/21  Yes Todd Weber MD   aspirin (ECOTRIN LOW STRENGTH) 81 mg EC tablet Take 1 tablet (81 mg total) by mouth daily 3/27/20  Yes Venson Kocher, MD   atorvastatin (LIPITOR) 40 mg tablet TAKE 1 TABLET BY MOUTH DAILY WITH DINNER 3/24/21  Yes Todd Weber MD   cholecalciferol (VITAMIN D3) 1,000 units tablet Take 1 tablet (1,000 Units total) by mouth daily 3/30/20 6/18/21 Yes Cj Childs MD   famotidine (PEPCID) 40 MG tablet Take 1 tablet (40 mg total) by mouth daily 6/4/21  Yes Cj Childs MD   metoprolol succinate (TOPROL-XL) 50 mg 24 hr tablet TAKE 3 TABLETS BY MOUTH EVERY DAY 1/14/21  Yes Cj Childs MD   pantoprazole (PROTONIX) 40 mg tablet TAKE 1 TABLET BY MOUTH EVERY DAY 3/31/21  Yes Cj Childs MD   warfarin (COUMADIN) 5 mg tablet Take 1 tablet (5 mg total) by mouth daily 6/4/21  Yes Cj Childs MD       Allergies: Allergies   Allergen Reactions    Losartan Angioedema    Bactrim [Sulfamethoxazole-Trimethoprim]     Eliquis [Apixaban] Other (See Comments)     Failed  Had embolic CVA    Lisinopril      Felt bad, was OK with Zestril brand name   Tramadol        Review of Systems:     Review of Systems   Constitutional: Negative  HENT: Negative  Eyes: Negative  Respiratory: Negative  Cardiovascular: Negative  Gastrointestinal: Negative  Endocrine: Negative  Genitourinary: Negative  Musculoskeletal: Negative  Skin: Negative  Allergic/Immunologic: Negative  Neurological:        Vision deficits since CVA with overall deconditioning   Hematological: Negative  Psychiatric/Behavioral: Negative  Vital Signs:     Vitals:    06/18/21 0951 06/18/21 0957   BP: 136/86 136/80   BP Location: Left arm Right arm   Patient Position: Sitting    Cuff Size: Standard    Pulse: (!) 54    Resp: 18    Temp: (!) 97 3 °F (36 3 °C)    TempSrc: Tympanic    SpO2: 95%    Weight: 116 kg (255 lb 9 6 oz)    Height: 5' 8" (1 727 m)        Physical Exam:     Physical Exam  Constitutional:       General: He is not in acute distress  Appearance: Normal appearance  He is obese  He is not toxic-appearing  HENT:      Head: Normocephalic  Right Ear: External ear normal       Left Ear: External ear normal       Nose: Nose normal       Mouth/Throat:      Mouth: Mucous membranes are moist       Pharynx: Oropharynx is clear     Eyes: General:         Right eye: No discharge  Left eye: Discharge present  Extraocular Movements: Extraocular movements intact  Conjunctiva/sclera: Conjunctivae normal       Pupils: Pupils are equal, round, and reactive to light  Neck:      Vascular: No carotid bruit  Cardiovascular:      Rate and Rhythm: Normal rate and regular rhythm  Pulses: Normal pulses  Heart sounds: No murmur heard  Comments: Sternotomy scar barely visible, sternum stable  Pulmonary:      Effort: Pulmonary effort is normal       Breath sounds: Normal breath sounds  Abdominal:      General: Abdomen is flat  Bowel sounds are normal  There is no distension  Palpations: Abdomen is soft  Tenderness: There is no abdominal tenderness  There is no guarding  Musculoskeletal:         General: No tenderness  Normal range of motion  Cervical back: Normal range of motion and neck supple  Right lower leg: No edema  Left lower leg: No edema  Skin:     General: Skin is warm and dry  Coloration: Skin is not pale  Findings: No erythema or rash  Neurological:      General: No focal deficit present  Mental Status: He is alert and oriented to person, place, and time  Cranial Nerves: No cranial nerve deficit  Motor: No weakness  Gait: Gait normal    Psychiatric:         Mood and Affect: Mood normal          Behavior: Behavior normal          Thought Content: Thought content normal          Judgment: Judgment normal          Lab Results:               Invalid input(s): LABGLOM  Results from last 7 days   Lab Units 06/15/21  1055   INR  1 85*     Lab Results   Component Value Date    HGBA1C 5 3 05/02/2021     Lab Results   Component Value Date    TROPONINI <0 02 03/13/2020       Imaging Studies:     CT Chest:   FINDINGS:     CHEST     LUNGS:  Lungs are clear    There is no tracheal or endobronchial lesion      PLEURA:  Unremarkable      HEART/GREAT VESSELS:  The heart is not enlarged and there is no pericardial effusion  Stable postoperative changes of the ascending thoracic aorta are present      MEDIASTINUM AND ELLA:  Unremarkable      CHEST WALL AND LOWER NECK:   Unremarkable      ABDOMEN     LIVER/BILIARY TREE:  Unremarkable      GALLBLADDER:  Gallbladder is surgically absent      SPLEEN:  There is a 3 mm hypodensity likely representing a tiny cyst      PANCREAS:  Unremarkable      ADRENAL GLANDS:  Unremarkable      KIDNEYS/URETERS:  No hydronephrosis or urinary tract calculus  One or more sharply circumscribed subcentimeter renal hypodensities are present, too small to accurately characterize, and statistically most likely benign findings  According to recent   literature (Radiology 2019) no further workup of these findings is recommended      STOMACH AND BOWEL:  There is a small sliding hilum hernia      APPENDIX:  No findings to suggest appendicitis      ABDOMINOPELVIC CAVITY:  No ascites  No pneumoperitoneum  No lymphadenopathy      VESSELS:  Unremarkable for patient's age      PELVIS     REPRODUCTIVE ORGANS:  Unremarkable for patient's age      URINARY BLADDER:  Unremarkable      ABDOMINAL WALL/INGUINAL REGIONS:  There are small bilateral fat-containing inguinal hernias      OSSEOUS STRUCTURES:  No acute fracture or destructive osseous lesion  Postoperative changes of prior median sternotomy are present      IMPRESSION:     No acute intra-abdominal abnormality  No free air or free fluid      No infiltrate or pleural effusion  No acute intrathoracic abnormality  NGUYỄN  LEFT VENTRICLE: Size was normal  Systolic function was normal  Ejection fraction was estimated to be 60 %  Although no diagnostic regional wall motion abnormality was identified, this possibility cannot be completely excluded on the basis  of this study  Wall thickness was mildly increased  There was mild concentric hypertrophy   DOPPLER: Left ventricular diastolic function parameters were normal      RIGHT VENTRICLE: The size was normal  Systolic function was normal  Wall thickness was normal      LEFT ATRIUM: Size was normal  No thrombus was identified  APPENDAGE: The size was normal  No thrombus was identified  DOPPLER: The function was normal (normal emptying velocity)      ATRIAL SEPTUM: No defect or patent foramen ovale was identified      RIGHT ATRIUM: Size was normal  No thrombus was identified      MITRAL VALVE: Valve structure was normal  There was normal leaflet separation  DOPPLER: There was mild regurgitation      AORTIC VALVE: The valve was bicuspid with right and left coronary cusp fusion  Leaflets exhibited moderately increased thickness  DOPPLER: Transaortic velocity was increased due to valvular stenosis  There was mild stenosis  There was no  significant regurgitation      TRICUSPID VALVE: The valve structure was normal  There was normal leaflet separation  DOPPLER: There was mild regurgitation      PULMONIC VALVE: DOPPLER: There was no significant regurgitation      PERICARDIUM: There was no pericardial effusion  The pericardium was normal in appearance      AORTA: The root exhibited normal size  There was no atheroma  There was no evidence for dissection  There was no evidence for aneurysm        I have personally reviewed pertinent reports        Assessment:  Patient Active Problem List    Diagnosis Date Noted    Spitting up blood 05/21/2021    Anticoagulated on Coumadin 05/06/2021    CORIE (obstructive sleep apnea)     Hyperbilirubinemia 08/10/2020    Antiphospholipid antibody with hypercoagulable state ([facility] ) 06/03/2020    Vision changes 04/09/2020    History of stroke 03/13/2020    Acute CVA [facility]cerebrovascular accident) ([facility] ) 01/31/2020    Other viral warts 06/26/2019    Physical deconditioning 06/26/2019    Obesity 05/02/2018    BRBPR (bright red blood per rectum) 11/10/2017    Numbness 08/26/2017    H/O aortic aneurysm repair 08/26/2017    Hypertension 08/26/2017  GERD (gastroesophageal reflux disease) 08/26/2017    Hyperlipidemia 03/17/2017    Peyronie disease 12/09/2016    Vitamin D deficiency 06/15/2016    Atrial fibrillation ([facility] ) 11/17/2014    Anxiety disorder 11/11/2014    Panic attack 10/31/2014    Constipation 09/19/2014    Irritable bowel syndrome 04/24/2014     Plan:  Stable postoperative aortic scan  Arrangements have been made for future surveillance to be completed with CTA chest/abdomen/pelvis in 2 Years  Weight management referral given to patient  Marlyn Adams was comfortable with our recommendations, and their questions were answered to their satisfaction  Thank you for allowing us to participate in the care of this patient  Aortic Aneurysm Instructions were provided to the patient as follows:    1  No lifting more than 50 pounds  2  Maintain a controlled blood pressure with a goal of 120/80  3  Follow up in Aortic Clinic as recommended with radiology follow up as instructed  4  Report to the ER or call 911 immediately with the following signs / symptoms: sudden onset of back pain, chest pain or shortness of breath  SIGNATURE: Jose Guzman  DATE: June 18, 2021  TIME: 10:12 AM  Attestation signed by Lorelei Monterroso MD at 6/18/2021 10:46 AM:  Pt seen and examined with PA  I agree with the above assessment and plan  Chris Sullivan is well known to me  His aortic repair is stable with no reason for concern  His bicuspid aortic valve continues to have mild stenosis which is unchanged from the time of his operation 7 years ago  He is on Aspirin and Coumadin for CVA's which do not appear to be due to cerebrovascular disease, but also no cardioembolic source was identified  He asked for a referral to weight management which was given  I'll see him again in 2 yrs for aortic surveillance        SIGNATUREGeorgie Muniz MD  DATE: June 18, 2021  TIME: 10:43 AM

## 2021-06-21 ENCOUNTER — LAB (OUTPATIENT)
Dept: LAB | Facility: HOSPITAL | Age: 61
End: 2021-06-21
Payer: COMMERCIAL

## 2021-06-21 ENCOUNTER — ANTICOAG VISIT (OUTPATIENT)
Dept: FAMILY MEDICINE CLINIC | Facility: CLINIC | Age: 61
End: 2021-06-21

## 2021-06-21 DIAGNOSIS — I63.9 ACUTE CVA (CEREBROVASCULAR ACCIDENT) (HCC): ICD-10-CM

## 2021-06-21 DIAGNOSIS — Z79.01 ANTICOAGULATED ON COUMADIN: ICD-10-CM

## 2021-06-21 DIAGNOSIS — I48.0 PAROXYSMAL ATRIAL FIBRILLATION (HCC): ICD-10-CM

## 2021-06-21 LAB
INR PPP: 2.56 (ref 0.84–1.19)
PROTHROMBIN TIME: 27 SECONDS (ref 11.6–14.5)

## 2021-06-21 PROCEDURE — 36415 COLL VENOUS BLD VENIPUNCTURE: CPT

## 2021-06-21 PROCEDURE — 85610 PROTHROMBIN TIME: CPT

## 2021-06-22 ENCOUNTER — TELEPHONE (OUTPATIENT)
Dept: GASTROENTEROLOGY | Facility: CLINIC | Age: 61
End: 2021-06-22

## 2021-06-22 ENCOUNTER — OFFICE VISIT (OUTPATIENT)
Dept: GASTROENTEROLOGY | Facility: CLINIC | Age: 61
End: 2021-06-22
Payer: COMMERCIAL

## 2021-06-22 VITALS
HEIGHT: 68 IN | SYSTOLIC BLOOD PRESSURE: 132 MMHG | WEIGHT: 255 LBS | BODY MASS INDEX: 38.65 KG/M2 | TEMPERATURE: 98.4 F | DIASTOLIC BLOOD PRESSURE: 82 MMHG

## 2021-06-22 DIAGNOSIS — Z79.01 ANTICOAGULATED ON COUMADIN: ICD-10-CM

## 2021-06-22 DIAGNOSIS — R04.2 SPITTING UP BLOOD: ICD-10-CM

## 2021-06-22 DIAGNOSIS — K21.9 GASTROESOPHAGEAL REFLUX DISEASE WITHOUT ESOPHAGITIS: Primary | ICD-10-CM

## 2021-06-22 PROBLEM — K62.5 BRBPR (BRIGHT RED BLOOD PER RECTUM): Status: RESOLVED | Noted: 2017-11-10 | Resolved: 2021-06-22

## 2021-06-22 PROCEDURE — 99204 OFFICE O/P NEW MOD 45 MIN: CPT | Performed by: INTERNAL MEDICINE

## 2021-06-22 PROCEDURE — 3075F SYST BP GE 130 - 139MM HG: CPT | Performed by: INTERNAL MEDICINE

## 2021-06-22 PROCEDURE — 3079F DIAST BP 80-89 MM HG: CPT | Performed by: INTERNAL MEDICINE

## 2021-06-22 PROCEDURE — 3008F BODY MASS INDEX DOCD: CPT | Performed by: INTERNAL MEDICINE

## 2021-06-22 PROCEDURE — 1036F TOBACCO NON-USER: CPT | Performed by: INTERNAL MEDICINE

## 2021-06-22 NOTE — TELEPHONE ENCOUNTER
Our mutual patient is scheduled for procedure: On: ___8_/_  22  _/_   24 _      With:   ___Adolfo______    He is taking the following blood thinner:   Coumadin       Can this be stopped ____5__ days prior to the procedure?       Physician Approving clearance: ________________________    PT WILL ALSO NEED A LOVENOX BRIDGE PER DR HORNER

## 2021-06-22 NOTE — PROGRESS NOTES
[facility] - Outpatient Consultation  Juan Regalado 64 y o  male MRN: [id number]  Encounter: [id number]          ASSESSMENT AND PLAN:       1  Gastroesophageal reflux disease   2  Hematemesis   3  AFib and CVA   4  History of aortic aneurysm repair     He reports intermittent dark stool and  1 episode self-limited hematemesis - given his underlying reflux symptoms I recommend EGD for further evaluation  Due to multiple CVAs in the past, He needs to be bridged with Lovenox for his EGD  Will request his primary doctor's recommendation regarding management of anticoagulation preprocedure  Continue reflux precautions  Continue pantoprazole 40 mg daily and Pepcid at bedtime   follow-up after EGD    ______________________________________________________________________    HPI:  58-year-old male with  Aortic aneurysm repair, aortic stenosis, antiphospholipid syndrome, AFib on Coumadin, multiple CVAs here for evaluation of reflux symptoms and hematemesis  He reports 1 episode of hematemesis  He described as spitting up bright red blood  She also reports intermittent dark bowel movements  Fortunately his hemoglobin is stable  He had colonoscopy last year by Dr Clark  And 1 polyp was removed  He has reflux symptoms currently taking pantoprazole 40 mg daily  His primary doctor added famotidine but he did not start taking famotidine yet  He has no dysphagia  He had CVA while on Eliquis and Xarelto in the past   Currently on Coumadin      REVIEW OF SYSTEMS:    CONSTITUTIONAL: Denies any fever, chills, rigors, and weight loss  HEENT: No earache or tinnitus  Denies hearing loss or visual disturbances  CARDIOVASCULAR: No chest pain or palpitations  RESPIRATORY: Denies any cough, hemoptysis, shortness of breath or dyspnea on exertion  GASTROINTESTINAL: As noted in the History of Present Illness  GENITOURINARY: No problems with urination   Denies any hematuria or dysuria  NEUROLOGIC: No dizziness or vertigo, denies headaches  MUSCULOSKELETAL: Denies any muscle or joint pain  SKIN: Denies skin rashes or itching  ENDOCRINE: Denies excessive thirst  Denies intolerance to heat or cold  PSYCHOSOCIAL: Denies depression or anxiety  Denies any recent memory loss  Historical Information   Past Medical History:   Diagnosis Date    Aneurysm of thoracic aorta ([facility] )     last assessed 11/20/17    Anxiety     currently on no meds    Cardiac disease     Cholelithiasis     Chronic kidney disease     GERD (gastroesophageal reflux disease)     Headache due to anesthesia     Hyperlipidemia     Hypertension     Irritable bowel syndrome     Kidney stone     Palpitations     PONV (postoperative nausea and vomiting)     Shortness of breath     ? related to acid reflux    Sleep apnea     not sure    Stroke ([facility] ) 11/2014    Stroke ([place] [address] ) 03/2021    TIA (transient ischemic attack)      Past Surgical History:   Procedure Laterality Date    AORTA SURGERY      thoracic - aneurysmorrhaphy    APPENDECTOMY      ASCENDING AORTIC ANEURYSM REPAIR      resolved 8/19/15    CHOLECYSTECTOMY      laparoscopic    CYSTOSCOPY      with removal of object- stent removal    KNEE ARTHROSCOPY Right 1996    with medial meniscus repair    LITHOTRIPSY      renal    [place] FRAGMENT KIDNEY STONE/ ESWL Left 5/17/2018    Procedure: LITHROTRIPSY EXTRACORPORAL SHOCKWAVE (ESWL);   Surgeon: Ashlee Allen MD;  Location: [facility];  Service: Urology    THUMB ARTHROSCOPY Right 1976    ligament repair     Social History   Social History     Substance and Sexual Activity   Alcohol Use Not Currently     Social History     Substance and Sexual Activity   Drug Use No     Social History     Tobacco Use   Smoking Status Never Smoker   Smokeless Tobacco Never Used   Tobacco Comment    no second hand smoke exposure     Family History   Problem Relation Age of Onset    Diverticulitis Mother         of colon    Nephrolithiasis Mother     Emphysema Father     Nephrolithiasis Sister     Heart attack Maternal Grandfather 72    Breast cancer Paternal Grandmother     Lung cancer Paternal Grandfather     Cancer Family     Coronary artery disease Family     Diabetes Family         sibling    Hypertension Family         sibling    Hernia Family         sibling       Meds/Allergies       Current Outpatient Medications:     amLODIPine (NORVASC) 5 mg tablet    aspirin (ECOTRIN LOW STRENGTH) 81 mg EC tablet    atorvastatin (LIPITOR) 40 mg tablet    famotidine (PEPCID) 40 MG tablet    metoprolol succinate (TOPROL-XL) 50 mg 24 hr tablet    pantoprazole (PROTONIX) 40 mg tablet    warfarin (COUMADIN) 5 mg tablet    cholecalciferol (VITAMIN D3) 1,000 units tablet    Allergies   Allergen Reactions    Losartan Angioedema    Bactrim [Sulfamethoxazole-Trimethoprim]     Eliquis [Apixaban] Other (See Comments)     Failed  Had embolic CVA    Lisinopril      Felt bad, was OK with Zestril brand name   Tramadol            Objective     Blood pressure 132/82, temperature 98 4 °F (36 9 °C), temperature source Tympanic, height 5' 8" (1 727 m), weight 116 kg (255 lb)  Body mass index is 38 77 kg/m²  PHYSICAL EXAM:      General Appearance:   Alert, cooperative, no distress   HEENT:   Normocephalic, atraumatic, anicteric      Neck:  Supple, symmetrical, trachea midline   Lungs:   Clear to auscultation bilaterally; no rales, rhonchi or wheezing; respirations unlabored    Heart[de-identified]   Regular rate and rhythm; no murmur, rub, or gallop     Abdomen:   Soft, non-tender, non-distended; normal bowel sounds; no masses, no organomegaly    Genitalia:   Deferred    Rectal:   Deferred    Extremities:  No cyanosis, clubbing or edema    Pulses:  2+ and symmetric    Skin:  No jaundice, rashes, or lesions    Lymph nodes:  No palpable cervical lymphadenopathy        Lab Results:   No visits with results within 1 Day(s) from this visit  Latest known visit with results is:   Lab on 06/21/2021   Component Date Value    Protime 06/21/2021 27 0*    INR 06/21/2021 2 56*         Radiology Results:   Cardiac EP device report    Result Date: 6/9/2021  Narrative: Tayler Zarate TRANSMISSION: LOOP RECORDER  PRESENTING RHYTHM VS @ 66 BPM  BATTERY STATUS "OK " 1 TACHY EPISODE W/ EGRAM SHOWING OVERSENSING  5 AF EPISODES W/ EGRAMS SUGGESTING SR W/ PVCs [ BIGEMINAL AT TIMES]  CAN NOT R/O AF  AF BURDEN = 0%  NO PATIENT OR DEVICE ACTIVATED EPISODES  NORMAL DEVICE FUNCTION   DL

## 2021-06-23 ENCOUNTER — TELEPHONE (OUTPATIENT)
Dept: FAMILY MEDICINE CLINIC | Facility: CLINIC | Age: 61
End: 2021-06-23

## 2021-06-23 NOTE — TELEPHONE ENCOUNTER
Gisell called from [address] [place] [address] regarding this pt  He is getting an endoscopy on 8/25 and Dr Karina Jeff approved him to stop taking Coumadin 5 days prior to the procedure  Per Dr Ann Callejas, the pt needs a Lovenox bridge  Per Marleny Betancourt, the pt is questioning this - how does he get a script? Does he need to do anything in particular? Please call the pt to advise, thank you!

## 2021-06-23 NOTE — TELEPHONE ENCOUNTER
If the patient needs a bridge to Lovenox for the procedure, he should contact his cardiologist for that

## 2021-06-23 NOTE — TELEPHONE ENCOUNTER
Pt questioning Lovenox bridge  Please call pt  Called  [employer] office    Continuously rang, will try again

## 2021-06-23 NOTE — TELEPHONE ENCOUNTER
Spoke to Praful at Dr [employer] office  She took the info about the Lovenox bridge and will reach out to the nurse in the office and call the pt

## 2021-07-07 ENCOUNTER — APPOINTMENT (OUTPATIENT)
Dept: LAB | Facility: HOSPITAL | Age: 61
End: 2021-07-07
Payer: COMMERCIAL

## 2021-07-07 ENCOUNTER — ANTICOAG VISIT (OUTPATIENT)
Dept: FAMILY MEDICINE CLINIC | Facility: CLINIC | Age: 61
End: 2021-07-07

## 2021-07-12 DIAGNOSIS — I10 ESSENTIAL HYPERTENSION: Chronic | ICD-10-CM

## 2021-07-12 RX ORDER — METOPROLOL SUCCINATE 50 MG/1
TABLET, EXTENDED RELEASE ORAL
Qty: 270 TABLET | Refills: 1 | Status: SHIPPED | OUTPATIENT
Start: 2021-07-12 | End: 2022-01-06

## 2021-07-19 ENCOUNTER — TELEPHONE (OUTPATIENT)
Dept: FAMILY MEDICINE CLINIC | Facility: CLINIC | Age: 61
End: 2021-07-19

## 2021-07-19 ENCOUNTER — APPOINTMENT (OUTPATIENT)
Dept: LAB | Facility: HOSPITAL | Age: 61
End: 2021-07-19
Payer: COMMERCIAL

## 2021-07-19 NOTE — TELEPHONE ENCOUNTER
If he is currently continuing to have blood issues with this, must follow-up with Cardiology and GI  The Coumadin level was fine with the INR  Given the CVA that he had before, cardiology and GI felt bridging was the most appropriate action for him

## 2021-07-19 NOTE — TELEPHONE ENCOUNTER
I called pt to let him know his PT/INR values and recommendations but he informed me he had episodes of spitting up blood this weekend and he wants to know if this changes his recommendations for Coumadin  I advised he also call GI to see if Endoscopy can be moved up  Also pt wants to know why Cardio has to give Lovenox bridge prior to procedure  Please advise  Once pt aware please make changes to green sheet and document anti-coag encounter

## 2021-07-20 ENCOUNTER — ANTICOAG VISIT (OUTPATIENT)
Dept: FAMILY MEDICINE CLINIC | Facility: CLINIC | Age: 61
End: 2021-07-20

## 2021-07-30 ENCOUNTER — TELEPHONE (OUTPATIENT)
Dept: NEUROLOGY | Facility: CLINIC | Age: 61
End: 2021-07-30

## 2021-07-30 NOTE — TELEPHONE ENCOUNTER
Called and left a voicemail for patient - Please call back to confirm upcoming appointment with Dr Anne Wynn  Provided patient with apt date, time and location  Informed patient that check in is at least 15 minutes prior to apt time

## 2021-08-02 ENCOUNTER — TELEPHONE (OUTPATIENT)
Dept: NEUROLOGY | Facility: CLINIC | Age: 61
End: 2021-08-02

## 2021-08-02 NOTE — TELEPHONE ENCOUNTER
Called and spoke to patient - confirmed upcoming appointment with Dr Campbell Jones   Provided patient with apt date, time and location  Informed patient that check in is at least 15 minutes prior to apt time

## 2021-08-04 ENCOUNTER — OFFICE VISIT (OUTPATIENT)
Dept: CARDIOLOGY CLINIC | Facility: CLINIC | Age: 61
End: 2021-08-04
Payer: COMMERCIAL

## 2021-08-04 VITALS
DIASTOLIC BLOOD PRESSURE: 88 MMHG | OXYGEN SATURATION: 97 % | BODY MASS INDEX: 38.83 KG/M2 | HEIGHT: 68 IN | SYSTOLIC BLOOD PRESSURE: 132 MMHG | HEART RATE: 75 BPM | WEIGHT: 256.2 LBS

## 2021-08-04 DIAGNOSIS — Z79.01 ON BRIDGING TREATMENT WITH LOVENOX: Primary | ICD-10-CM

## 2021-08-04 PROCEDURE — 3079F DIAST BP 80-89 MM HG: CPT | Performed by: INTERNAL MEDICINE

## 2021-08-04 PROCEDURE — 3008F BODY MASS INDEX DOCD: CPT | Performed by: INTERNAL MEDICINE

## 2021-08-04 PROCEDURE — 99215 OFFICE O/P EST HI 40 MIN: CPT | Performed by: INTERNAL MEDICINE

## 2021-08-04 PROCEDURE — 3075F SYST BP GE 130 - 139MM HG: CPT | Performed by: INTERNAL MEDICINE

## 2021-08-04 PROCEDURE — 1036F TOBACCO NON-USER: CPT | Performed by: INTERNAL MEDICINE

## 2021-08-04 NOTE — PROGRESS NOTES
Cardiology   MD Trey Jeter MD Ather Mansoor, MD Terrea Rodes, DO, Racheal Herrera DO, Lydia Reina DO, [facility] [place]  -------------------------------------------------------------------  [facility]  [address] [place] [id number]  [place]  [address] [facility]                     1960                     [id number]          Assessment/Plan:     1  Benign essential hypertension  2  Bicuspid aortic valve with mild aortic valve stenosis  3  History of significant ascending aortic aneurysm status post replacement with matthew arch construction in 2014 (bicuspid valve not replaced at that time)  4  Paroxysmal atrial fibrillation--noted postOP 2014 without objective recurrence  5  Prior lacunar infarcts on MRI brain 1/2020 with recurrent stroke 03/2020 while on Xarelto, subsequently switched to Eliquis/aspirin, with subsequent CVA on MRI noted 5/2021--switched to warfarin then  6  Hypercoagulable state with antiphospholipid antibody syndrome, positive lupus anticoagulant antibodies  7  Chronic atypical chest pain       · Prior loop recorder interrogation unremarkable    · Blood pressure controlled, weight stable  · Regular rhythm on examination  · Patient for EGD on 08/25/2021  I have recommended bridging with Lovenox in light of his recurrent CVA, even on anticoagulation  I have given him specific instructions in writing as follows:  8/19:  Last day of warfarin   8/22 p m :  Start Lovenox 1 milligram/kilogram b i d , pm only this day   8/23:  Take Lovenox in a m  And p m    8/24:  Take Lovenox in a m  Only   8/25:  Have procedure done   Take warfarin 8/20 5 mg in p m  If okay with Dr Karina Larios   8/26:  Take Lovenox in a m  And p m  Heather Apple warfarin in p m    8/27:  Take Lovenox in a m   Only   Take warfarin in p m    8/28:  Check INR   If subtherapeutic, less than 1 8, would give higher dose of warfarin · I have forward these instructions to his warfarin clinic RN as well as his PCP and Dr Yumiko Chahal  Lovenox 100 milligrams/milliliter was called into his pharmacy in our staff did speak with the pharmacist   The risk of increased bleeding complications with any kind of bridging with warfarin was discussed with the patient as well as the potential for thromboembolic complications with anticoagulation interruption    He does have increased hematemesis, he was asked to call his gastroenterologist right away             Follow-up in 1 month    Total visit time 45 minutes with >50% of time coordinating care and counseling          Interval History:      This is a very 58 YO male with a significant history of ascending aortic aneurysm, status post open replacement of the ascending aorta with matthew arch reconstruction on 11/06/2014   Preoperative cardiac catheterization was unremarkable   Postoperatively, he had transient atrial fibrillation, which converted to sinus rhythm on amiodarone/metoprolol, without any recurrence   Therefore he has not been maintained on anticoagulation while seeing Dr Narcisa Gunn in the past  Charles Perez also has a history of bicuspid aortic valve without significant stenosis, hypertension      He was last hospitalized August 2017 for numbness on his face, left arm, and left leg, with MRI brain which was unremarkable for stroke, without any evidence of atrial fibrillation on telemetry   MRI cervical spine 09/2017 was unremarkable   Echocardiogram on 8/27/17 revealed a normal ejection fraction at 60% with no evidence of aortic stenosis and no regurgitation   The valve leaflets were not well visualized      Prior Holter monitor October 2017 revealed no evidence of atrial fibrillation, only occasional PACs and PVCs      During his prior visit 01/2019 he had complaints of atypical chest pain   Thereafter he underwent a nuclear stress test 02/2019 which was unremarkable   Echocardiogram revealed mild aortic stenosis      His losartan was discontinued 09/2019 secondary to angioedema   During his appointment with his PCP 12/26/2019, MRI of his head was recommended secondary to ongoing left arm weakness   MRI brain 01/02/2020 reported several subacute lacunar infarctions with microvascular ischemic disease which was stable   Subsequently, he was started on Xarelto by his PCP in light of his prior history of paroxysmal atrial fibrillation which was postoperative  He subsequently was hospitalized 03/2020 with recurrent stroke with CTA and MRI of the brain confirming a new CVA  He underwent loop recorder placement and was changed from Xarelto to Eliquis  Aspirin was added      He has been following with Hematology and has been diagnosed with antiphospholipid syndrome, with blood work 03/2020 confirming presence of lupus anticoagulant antibodies  He was hospitalized 05/01/2021 due to findings of CVA on outpatient MRI that was obtained visual field deficits  He underwent a NGUYỄN which was unremarkable, and was transition from Eliquis to warfarin and aspirin at discharge  His INR care was given to his PCP  Most recent device interrogation 06/09/2021 revealed normally functioning device with sinus rhythm and PACs  He was evaluated by Gastroenterology 06/22/2021 for intermittent dark stools and self limited hematemesis  He is scheduled for an EGD 08/25/2021  From a cardiovascular symptomatic standpoint he feels well without chest pain, shortness of breath, dizziness, palpitations, lower extremity edema               Vitals:  Vitals:    08/04/21 1430   BP: 132/88   Pulse: 75   SpO2: 97%   Weight: 116 kg (256 lb 3 2 oz)   Height: 5' 8" (1 727 m)         Past Medical History:   Diagnosis Date    Aneurysm of thoracic aorta ([address] )     last assessed 11/20/17    Anxiety     currently on no meds    Cardiac disease     Cholelithiasis     Chronic kidney disease     GERD (gastroesophageal reflux disease)     Headache due to anesthesia     Hyperlipidemia     Hypertension     Irritable bowel syndrome     Kidney stone     Palpitations     PONV (postoperative nausea and vomiting)     Shortness of breath     ? related to acid reflux    Sleep apnea     not sure    Stroke [facility]) 11/2014    Stroke [address] [address] ) 03/2021    TIA (transient ischemic attack)      Social History     Socioeconomic History    Marital status:      Spouse name: Not on file    Number of children: Not on file    Years of education: Not on file    Highest education level: Not on file   Occupational History    Occupation: disabled     Tobacco Use    Smoking status: Never Smoker    Smokeless tobacco: Never Used    Tobacco comment: no second hand smoke exposure   Vaping Use    Vaping Use: Never used   Substance and Sexual Activity    Alcohol use: Not Currently    Drug use: No    Sexual activity: Not on file   Other Topics Concern    Not on file   Social History Narrative    Caffeine use    Completed some college     as per [employer]     Social Determinants of Health     Financial Resource Strain:     Difficulty of Paying Living Expenses:    Food Insecurity:     Worried About Running Out of Food in the Last Year:     Ran Out of Food in the Last Year:    Transportation Needs:     Lack of Transportation (Medical):      Lack of Transportation (Non-Medical):    Physical Activity:     Days of Exercise per Week:     Minutes of Exercise per Session:    Stress:     Feeling of Stress :    Social Connections:     Frequency of Communication with Friends and Family:     Frequency of Social Gatherings with Friends and Family:     Attends Yazidi Services:     Active Member of Clubs or Organizations:     Attends Club or Organization Meetings:     Marital Status:    Intimate Partner Violence:     Fear of Current or Ex-Partner:     Emotionally Abused:     Physically Abused:     Sexually Abused:       Family History   Problem Relation Age of Onset    Diverticulitis Mother         of colon    Nephrolithiasis Mother     Emphysema Father     Nephrolithiasis Sister     Heart attack Maternal Grandfather 72    Breast cancer Paternal Grandmother     Lung cancer Paternal Grandfather     Cancer Family     Coronary artery disease Family     Diabetes Family         sibling    Hypertension Family         sibling    Hernia Family         sibling     Past Surgical History:   Procedure Laterality Date    AORTA SURGERY      thoracic - aneurysmorrhaphy    APPENDECTOMY      ASCENDING AORTIC ANEURYSM REPAIR      resolved 8/19/15    CHOLECYSTECTOMY      laparoscopic    CYSTOSCOPY      with removal of object- stent removal    KNEE ARTHROSCOPY Right 1996    with medial meniscus repair    LITHOTRIPSY      renal    AK FRAGMENT KIDNEY STONE/ ESWL Left 5/17/2018    Procedure: LITHROTRIPSY EXTRACORPORAL SHOCKWAVE (ESWL);   Surgeon: Robbie Lynn MD;  Location: [facility];  Service: Urology    THUMB ARTHROSCOPY Right 1976    ligament repair       Current Outpatient Medications:     amLODIPine (NORVASC) 5 mg tablet, TAKE 1 TABLET BY MOUTH EVERY DAY, Disp: 90 tablet, Rfl: 1    aspirin (ECOTRIN LOW STRENGTH) 81 mg EC tablet, Take 1 tablet (81 mg total) by mouth daily, Disp: 30 tablet, Rfl: 0    atorvastatin (LIPITOR) 40 mg tablet, TAKE 1 TABLET BY MOUTH DAILY WITH DINNER, Disp: 90 tablet, Rfl: 1    cholecalciferol (VITAMIN D3) 1,000 units tablet, Take 1 tablet (1,000 Units total) by mouth daily, Disp: 30 tablet, Rfl: 5    famotidine (PEPCID) 40 MG tablet, Take 1 tablet (40 mg total) by mouth daily, Disp: 30 tablet, Rfl: 5    metoprolol succinate (TOPROL-XL) 50 mg 24 hr tablet, TAKE 3 TABLETS BY MOUTH EVERY DAY, Disp: 270 tablet, Rfl: 1    pantoprazole (PROTONIX) 40 mg tablet, TAKE 1 TABLET BY MOUTH EVERY DAY, Disp: 90 tablet, Rfl: 1    warfarin (COUMADIN) 5 mg tablet, Take 1 tablet (5 mg total) by mouth daily, Disp: 30 tablet, Rfl: 5   enoxaparin (Lovenox) 100 mg/mL, Inject 1 2 mL (120 mg total) under the skin every 12 (twelve) hours, Disp: 8 mL, Rfl: 0        Review of Systems:  Review of Systems   Respiratory: Negative  Negative for chest tightness and shortness of breath  Cardiovascular: Negative  All other systems reviewed and are negative  Physical Exam:  Physical Exam  Constitutional:       General: He is not in acute distress  Appearance: He is well-developed  He is not diaphoretic  HENT:      Head: Normocephalic and atraumatic  Eyes:      General: No scleral icterus  Right eye: No discharge  Pupils: Pupils are equal, round, and reactive to light  Neck:      Thyroid: No thyromegaly  Cardiovascular:      Rate and Rhythm: Normal rate and regular rhythm  Heart sounds: Normal heart sounds  No murmur heard  No friction rub  No gallop  Pulmonary:      Effort: Pulmonary effort is normal       Breath sounds: Normal breath sounds  No wheezing or rales  Abdominal:      General: There is no distension  Tenderness: There is no abdominal tenderness  There is no guarding or rebound  Musculoskeletal:         General: Normal range of motion  Cervical back: Normal range of motion and neck supple  Skin:     General: Skin is warm and dry  Coloration: Skin is not pale  Findings: No erythema or rash  Neurological:      Mental Status: He is alert and oriented to person, place, and time  Coordination: Coordination normal    Psychiatric:         Behavior: Behavior normal          Thought Content: Thought content normal          Judgment: Judgment normal          This note was completed in part utilizing [email or web address] Flu[employer] Direct Software  Grammatical errors, random word insertions, spelling mistakes, and incomplete sentences can be an occasional consequence of this system secondary to software limitations, ambient noise, and hardware issues    If you have any questions or concerns about the content, text, or information contained within the body of this dictation, please contact the provider for clarification

## 2021-08-09 ENCOUNTER — APPOINTMENT (OUTPATIENT)
Dept: LAB | Facility: HOSPITAL | Age: 61
End: 2021-08-09
Payer: COMMERCIAL

## 2021-08-09 ENCOUNTER — TELEPHONE (OUTPATIENT)
Dept: GASTROENTEROLOGY | Facility: CLINIC | Age: 61
End: 2021-08-09

## 2021-08-09 DIAGNOSIS — I10 ESSENTIAL HYPERTENSION: ICD-10-CM

## 2021-08-09 DIAGNOSIS — E80.6 HYPERBILIRUBINEMIA: ICD-10-CM

## 2021-08-09 DIAGNOSIS — E78.2 MIXED HYPERLIPIDEMIA: Chronic | ICD-10-CM

## 2021-08-09 DIAGNOSIS — E66.01 CLASS 2 SEVERE OBESITY DUE TO EXCESS CALORIES WITH SERIOUS COMORBIDITY AND BODY MASS INDEX (BMI) OF 38.0 TO 38.9 IN ADULT (HCC): ICD-10-CM

## 2021-08-09 DIAGNOSIS — E55.9 VITAMIN D DEFICIENCY: ICD-10-CM

## 2021-08-09 DIAGNOSIS — R74.8 ALKALINE PHOSPHATASE ELEVATION: ICD-10-CM

## 2021-08-09 LAB
25(OH)D3 SERPL-MCNC: 38.8 NG/ML (ref 30–100)
ALBUMIN SERPL BCP-MCNC: 3.6 G/DL (ref 3.5–5)
ALP SERPL-CCNC: 159 U/L (ref 46–116)
ALT SERPL W P-5'-P-CCNC: 45 U/L (ref 12–78)
ANION GAP SERPL CALCULATED.3IONS-SCNC: 8 MMOL/L (ref 4–13)
AST SERPL W P-5'-P-CCNC: 24 U/L (ref 5–45)
BILIRUB SERPL-MCNC: 1.44 MG/DL (ref 0.2–1)
BUN SERPL-MCNC: 8 MG/DL (ref 5–25)
CALCIUM SERPL-MCNC: 8.5 MG/DL (ref 8.3–10.1)
CHLORIDE SERPL-SCNC: 106 MMOL/L (ref 100–108)
CHOLEST SERPL-MCNC: 126 MG/DL (ref 50–200)
CO2 SERPL-SCNC: 29 MMOL/L (ref 21–32)
CREAT SERPL-MCNC: 1.11 MG/DL (ref 0.6–1.3)
GFR SERPL CREATININE-BSD FRML MDRD: 71 ML/MIN/1.73SQ M
GLUCOSE P FAST SERPL-MCNC: 113 MG/DL (ref 65–99)
HDLC SERPL-MCNC: 37 MG/DL
LDLC SERPL CALC-MCNC: 72 MG/DL (ref 0–100)
NONHDLC SERPL-MCNC: 89 MG/DL
POTASSIUM SERPL-SCNC: 3.8 MMOL/L (ref 3.5–5.3)
PROT SERPL-MCNC: 7 G/DL (ref 6.4–8.2)
SODIUM SERPL-SCNC: 143 MMOL/L (ref 136–145)
TRIGL SERPL-MCNC: 83 MG/DL

## 2021-08-09 PROCEDURE — 82306 VITAMIN D 25 HYDROXY: CPT

## 2021-08-09 PROCEDURE — 80061 LIPID PANEL: CPT

## 2021-08-09 PROCEDURE — 36415 COLL VENOUS BLD VENIPUNCTURE: CPT

## 2021-08-09 PROCEDURE — 80053 COMPREHEN METABOLIC PANEL: CPT

## 2021-08-09 PROCEDURE — 84075 ASSAY ALKALINE PHOSPHATASE: CPT

## 2021-08-09 PROCEDURE — 84080 ASSAY ALKALINE PHOSPHATASES: CPT

## 2021-08-09 NOTE — TELEPHONE ENCOUNTER
----- Message from Heaven Leahy MD sent at 2021 12:14 PM EDT -----    ----- Message -----  From: Fátima Fisher DO  Sent: 2021   3:06 PM EDT  To: Catie Helms MD, #    Dear Ashley Vargas,     In light of this pt's h/o Afib and prior strokes (even on [facility]), I have recommended bridging with Lovenox when his warfarin is being held for his upcoming endoscopy on 2021  I recommend the followin/19:  Last day of warfarin   p m :  Start Lovenox 1 milligram/kilogram b i d , pm only this day  :  Take Lovenox in a m  And p m   : Take Lovenox in a m  Only  :  Have procedure done  Take warfarin  5 mg in p m  If okay with Dr Reyes Styles  :  Take Lovenox in a m  And p m     Take warfarin in p m   :  Take Lovenox in a m  Only  Take warfarin in p m   :  Check INR  If subtherapeutic, less than 1 8, would give higher dose of warfarin    I have given him these instructions in writing  Dr Doug Vázquez, please reach out to me if you need any further guidance about this pt's bridging and just double check the plan incdarion Margo made a mistake   Thanks    Fátima Fisher  Cardiology

## 2021-08-11 ENCOUNTER — OFFICE VISIT (OUTPATIENT)
Dept: NEUROLOGY | Facility: CLINIC | Age: 61
End: 2021-08-11
Payer: COMMERCIAL

## 2021-08-11 ENCOUNTER — APPOINTMENT (OUTPATIENT)
Dept: LAB | Facility: HOSPITAL | Age: 61
End: 2021-08-11
Payer: COMMERCIAL

## 2021-08-11 ENCOUNTER — TELEPHONE (OUTPATIENT)
Dept: NEUROLOGY | Facility: CLINIC | Age: 61
End: 2021-08-11

## 2021-08-11 ENCOUNTER — HOSPITAL ENCOUNTER (OUTPATIENT)
Dept: ULTRASOUND IMAGING | Facility: HOSPITAL | Age: 61
Discharge: HOME | End: 2021-08-11
Payer: COMMERCIAL

## 2021-08-11 VITALS
BODY MASS INDEX: 39.23 KG/M2 | HEART RATE: 62 BPM | SYSTOLIC BLOOD PRESSURE: 130 MMHG | DIASTOLIC BLOOD PRESSURE: 78 MMHG | WEIGHT: 258 LBS

## 2021-08-11 DIAGNOSIS — D68.61 ANTIPHOSPHOLIPID ANTIBODY WITH HYPERCOAGULABLE STATE (HCC): ICD-10-CM

## 2021-08-11 DIAGNOSIS — Z86.73 HISTORY OF STROKE: Primary | ICD-10-CM

## 2021-08-11 DIAGNOSIS — I10 ESSENTIAL HYPERTENSION: ICD-10-CM

## 2021-08-11 DIAGNOSIS — R25.1 TREMOR: ICD-10-CM

## 2021-08-11 DIAGNOSIS — R29.898 WEAKNESS OF BOTH LOWER EXTREMITIES: ICD-10-CM

## 2021-08-11 DIAGNOSIS — Z79.01 ANTICOAGULATED ON COUMADIN: ICD-10-CM

## 2021-08-11 DIAGNOSIS — E78.2 MIXED HYPERLIPIDEMIA: Chronic | ICD-10-CM

## 2021-08-11 DIAGNOSIS — Z12.5 PROSTATE CANCER SCREENING: ICD-10-CM

## 2021-08-11 DIAGNOSIS — N20.0 KIDNEY STONES: ICD-10-CM

## 2021-08-11 PROBLEM — H53.9 VISION CHANGES: Status: RESOLVED | Noted: 2020-04-09 | Resolved: 2021-08-11

## 2021-08-11 PROBLEM — I63.9 ACUTE CVA (CEREBROVASCULAR ACCIDENT) (HCC): Status: RESOLVED | Noted: 2020-01-31 | Resolved: 2021-08-11

## 2021-08-11 LAB
ALP BONE CFR SERPL: 18 % (ref 12–68)
ALP INTEST CFR SERPL: 3 % (ref 0–18)
ALP LIVER CFR SERPL: 79 % (ref 13–88)
ALP SERPL-CCNC: 156 IU/L (ref 48–121)
PSA SERPL-MCNC: 0.8 NG/ML (ref 0–4)

## 2021-08-11 PROCEDURE — 36415 COLL VENOUS BLD VENIPUNCTURE: CPT

## 2021-08-11 PROCEDURE — 76770 US EXAM ABDO BACK WALL COMP: CPT

## 2021-08-11 PROCEDURE — 3075F SYST BP GE 130 - 139MM HG: CPT | Performed by: PSYCHIATRY & NEUROLOGY

## 2021-08-11 PROCEDURE — 3078F DIAST BP <80 MM HG: CPT | Performed by: PSYCHIATRY & NEUROLOGY

## 2021-08-11 PROCEDURE — G0103 PSA SCREENING: HCPCS

## 2021-08-11 PROCEDURE — 99215 OFFICE O/P EST HI 40 MIN: CPT | Performed by: PSYCHIATRY & NEUROLOGY

## 2021-08-11 NOTE — PROGRESS NOTES
Patient ID: Marianne Jones is a 64 y o  male  Assessment/Plan:   Patient Instructions     Stroke:   Orlando Branham presents for follow-up evaluation with regard to his prior stroke  He reports no new symptoms concerning for recurrent TIA or stroke since his recent hospitalization, but he was hospitalized with a breakthrough embolic stroke in spite of his combination of aspirin and Eliquis  He reports that he is doing reasonably well on warfarin although he is having some episodes with mild hemoptysis in the morning and it is unclear if this is coming from the nasopharynx or from the esophagus  He is also having a little bit of dysphagia to solids  -for ongoing stroke prevention he should continue his combination of warfarin, statin, and appropriate blood pressure and glycemic control  -we will defer to the judgment of his primary care team for monitoring of his cholesterol panel and blood sugar numbers   - from a neurologic standpoint he would not require aspirin in addition to warfarin for stroke prevention  We will send a message to the Cardiology and Cardiothoracic surgery groups  If they agree that aspirin can be discontinued we will do so in order to reduce his bleeding risk    Left upper extremity tremor: He does have an intermittent rest predominant left upper extremity tremor  Which is somewhat bothersome to him  He does have preserved strength and sensation in the left arm on our examination today   -at this point in time I would not suggest that a CBD combination would be appropriate for treatment although we do have several other medication treatment options  If he would like to use a medication on a daily basis to treat the left arm tremor we would be more than happy to work with him in this regard  He should contact our office if and when he decides that the left arm tremor requires treatment        Bilateral lower extremity weakness: He does note difficulty with both legs, particularly with attempting to stand  On examination he does have relatively preserved strength including functional testing   - we will request an ankle brachial index to be performed in order to ensure that he is getting adequate blood flow   -if cleared by his cardiology group it would be reasonable to pursue a course of physical therapy to help with strength in the proximal bilateral legs   - he has additionally experiencing some musculoskeletal pain in the low back which could be evaluated further with physical therapy   -he reports that he has a diastasis  I would like him to discuss this with his primary care team   Ultimately my experience with this is limited however it would seem unlikely that that would resolve purely with exercise  I will plan for him to follow up with us in 8 months but would be happy to see him sooner if the need should arise  If he has any stroke-like symptoms such as sudden painless loss of vision or double vision, difficulty speaking or swallowing, vertigo/ room spinning that does not quickly resolve, or weakness / numbness affecting 1 side of the face or body he should proceed by ambulance to the nearest emergency room immediately  If he were to fall and strike his head and have any residual symptoms or to developed a sudden extremely severe very unusual headache he should likewise be seen in the nearest emergency room  He has been given instructions by his cardiologist to bridge with Lovenox in order to have his upcoming endoscopy  I agree that a Lovenox bridge is appropriate and he is cleared from a neurologic standpoint to have the endoscopy performed  No problem-specific Assessment & Plan notes found for this encounter         Diagnoses and all orders for this visit:    History of stroke    Essential hypertension    Antiphospholipid antibody with hypercoagulable state ([facility] [address] )    Anticoagulated on Coumadin    Mixed hyperlipidemia    Tremor    Weakness of both lower extremities  -     VAS ANA & waveform analysis, multiple levels; Future           Subjective:    HPI      Orlando Branham presents for follow-up evaluation with regard to multiple neurologic issues  He does have a prior history of stroke and in the interval since his last visit to the office  He reported some vision changes and was sent from [place] which did show a new embolic appearing stroke in spite of the combination of Eliquis and aspirin  A subsequently was transition to warfarin  He has remained on aspirin but at this point in time I do not have a specific reason he will need to be on aspirin  We will discuss with cardiology and CT surg and if they agree   We will likely discontinue aspirin  He reports that he has had ongoing challenges with regard to lower extremity weakness  He has bilateral lower extremity weakness that affects his ability to stand up from a seated position  He did not endorse symptoms of increased weakness with bilateral lower extremity activity per se  He does have any prior aorta reconstruction  He also endorsed discomfort in the left lower extremity in the lateral thigh most consistent with meralgia paresthetica  He additionally has pain in the low back at approximately the L3 level radiating around the sides and towards the front  I reviewed the results of his most recent MRI which did not show any evidence of significant foraminal stenosis or central canal stenosis          Past Medical History:   Diagnosis Date    Aneurysm of thoracic aorta ([address] )     last assessed 11/20/17    Anxiety     currently on no meds    Cardiac disease     Cholelithiasis     Chronic kidney disease     GERD (gastroesophageal reflux disease)     Headache due to anesthesia     Hyperlipidemia     Hypertension     Irritable bowel syndrome     Kidney stone     Palpitations     PONV (postoperative nausea and vomiting)     Shortness of breath     ? related to acid reflux    Sleep apnea     not sure    Stroke [facility]) 11/2014    Stroke ([facility] [address] ) 03/2021    TIA (transient ischemic attack)        Social History     Socioeconomic History    Marital status:      Spouse name: None    Number of children: None    Years of education: None    Highest education level: None   Occupational History    Occupation: disabled     Tobacco Use    Smoking status: Never Smoker    Smokeless tobacco: Never Used    Tobacco comment: no second hand smoke exposure   Vaping Use    Vaping Use: Never used   Substance and Sexual Activity    Alcohol use: Not Currently    Drug use: No    Sexual activity: None   Other Topics Concern    None   Social History Narrative    Caffeine use    Completed some college     as per [employer]     Social Determinants of Health     Financial Resource Strain:     Difficulty of Paying Living Expenses:    Food Insecurity:     Worried About Running Out of Food in the Last Year:     Ran Out of Food in the Last Year:    Transportation Needs:     Lack of Transportation (Medical):      Lack of Transportation (Non-Medical):    Physical Activity:     Days of Exercise per Week:     Minutes of Exercise per Session:    Stress:     Feeling of Stress :    Social Connections:     Frequency of Communication with Friends and Family:     Frequency of Social Gatherings with Friends and Family:     Attends Adventism Services:     Active Member of Clubs or Organizations:     Attends Club or Organization Meetings:     Marital Status:    Intimate Partner Violence:     Fear of Current or Ex-Partner:     Emotionally Abused:     Physically Abused:     Sexually Abused:          Current Outpatient Medications:     amLODIPine (NORVASC) 5 mg tablet, TAKE 1 TABLET BY MOUTH EVERY DAY, Disp: 90 tablet, Rfl: 1    aspirin (ECOTRIN LOW STRENGTH) 81 mg EC tablet, Take 1 tablet (81 mg total) by mouth daily, Disp: 30 tablet, Rfl: 0    atorvastatin (LIPITOR) 40 mg tablet, TAKE 1 TABLET BY MOUTH DAILY WITH DINNER, Disp: 90 tablet, Rfl: 1    cholecalciferol (VITAMIN D3) 1,000 units tablet, Take 1 tablet (1,000 Units total) by mouth daily, Disp: 30 tablet, Rfl: 5    enoxaparin (Lovenox) 100 mg/mL, Inject 1 2 mL (120 mg total) under the skin every 12 (twelve) hours, Disp: 8 mL, Rfl: 0    famotidine (PEPCID) 40 MG tablet, Take 1 tablet (40 mg total) by mouth daily, Disp: 30 tablet, Rfl: 5    metoprolol succinate (TOPROL-XL) 50 mg 24 hr tablet, TAKE 3 TABLETS BY MOUTH EVERY DAY, Disp: 270 tablet, Rfl: 1    pantoprazole (PROTONIX) 40 mg tablet, TAKE 1 TABLET BY MOUTH EVERY DAY, Disp: 90 tablet, Rfl: 1    warfarin (COUMADIN) 5 mg tablet, Take 1 tablet (5 mg total) by mouth daily, Disp: 30 tablet, Rfl: 5    Allergies   Allergen Reactions    Losartan Angioedema    Bactrim [Sulfamethoxazole-Trimethoprim]     Eliquis [Apixaban] Other (See Comments)     Failed  Had embolic CVA    Lisinopril      Felt bad, was OK with Zestril brand name   Tramadol              Objective:    /78 (BP Location: Left arm, Patient Position: Sitting, Cuff Size: Standard)   Pulse 62   Wt 117 kg (258 lb)   BMI 39 23 kg/m²       Physical Exam    Neurological Exam      at the time of our examination he was awake, alert, and in no distress  Cranial nerves 2-12 were symmetrically intact  He was oriented x3  Motor testing revealed symmetric strength of the bilateral upper lower extremities  Sensation to temperature  Temperature sense was diminished in the lateral aspect of both legs compared to the medial aspect  Vibration sensation was intact on both sides  He was able to rise from a seated position with his arms crossed  He was able to stand on 1 leg and a rise onto his toes and perform a deep knee bend independently on each leg  He did have some difficulty doing that with the right leg compared with the left  His gait was stable with no clear ataxia or hemiparesis  Strength was 5/5 throughout        Exam was performed live by Dr Hannah Gooden and Samantha DIOPII under my direct observation    ROS:    Review of Systems   Constitutional: Positive for fatigue  Negative for appetite change and fever  HENT: Positive for trouble swallowing  Negative for hearing loss, tinnitus and voice change  Eyes: Negative  Negative for photophobia and pain  Respiratory: Negative  Negative for shortness of breath  Cardiovascular: Negative  Negative for palpitations  Gastrointestinal: Negative  Negative for nausea and vomiting  Endocrine: Negative  Negative for cold intolerance  Genitourinary: Negative  Negative for dysuria, frequency and urgency  Musculoskeletal: Positive for back pain (back pain that travels to legs)  Negative for myalgias and neck pain  Sometimes has issues with walking straight line  Getting hard for him to stand up and walk a long distance as it causes him to lose breathe quickly     Skin: Negative  Negative for rash  Allergic/Immunologic: Negative  Neurological: Positive for tremors (Left arm), weakness (legs) and numbness (Some in fingers and feet but have gotten better)  Negative for dizziness, seizures, syncope, facial asymmetry, speech difficulty, light-headedness and headaches  Hematological: Negative  Does not bruise/bleed easily  Psychiatric/Behavioral: Negative  Negative for confusion, hallucinations and sleep disturbance  All other systems reviewed and are negative  Reviewed ROS as entered by medical assistant

## 2021-08-11 NOTE — TELEPHONE ENCOUNTER
Called pt and spoke to Gio Brooks in regards to his appt and made him aware that the resident will be seeing him in the office today and Dr Lupis Vizcaino will be on virtually in the office  Patient was okay with this and we also had an opening in his schedule for 2:00 PM  Patient agreed to the appt time change and is now scheduled

## 2021-08-11 NOTE — PATIENT INSTRUCTIONS
Stroke:   Cherelle Villalobos presents for follow-up evaluation with regard to his prior stroke  He reports no new symptoms concerning for recurrent TIA or stroke since his recent hospitalization, but he was hospitalized with a breakthrough embolic stroke in spite of his combination of aspirin and Eliquis  He reports that he is doing reasonably well on warfarin although he is having some episodes with mild hemoptysis in the morning and it is unclear if this is coming from the nasopharynx or from the esophagus  He is also having a little bit of dysphagia to solids  -for ongoing stroke prevention he should continue his combination of warfarin, statin, and appropriate blood pressure and glycemic control  -we will defer to the judgment of his primary care team for monitoring of his cholesterol panel and blood sugar numbers   - from a neurologic standpoint he would not require aspirin in addition to warfarin for stroke prevention  We will send a message to the Cardiology and Cardiothoracic surgery groups  If they agree that aspirin can be discontinued we will do so in order to reduce his bleeding risk    Left upper extremity tremor: He does have an intermittent rest predominant left upper extremity tremor  Which is somewhat bothersome to him  He does have preserved strength and sensation in the left arm on our examination today   -at this point in time I would not suggest that a CBD combination would be appropriate for treatment although we do have several other medication treatment options  If he would like to use a medication on a daily basis to treat the left arm tremor we would be more than happy to work with him in this regard  He should contact our office if and when he decides that the left arm tremor requires treatment  Bilateral lower extremity weakness: He does note difficulty with both legs, particularly with attempting to stand    On examination he does have relatively preserved strength including functional testing   - we will request an ankle brachial index to be performed in order to ensure that he is getting adequate blood flow   -if cleared by his cardiology group it would be reasonable to pursue a course of physical therapy to help with strength in the proximal bilateral legs   - he has additionally experiencing some musculoskeletal pain in the low back which could be evaluated further with physical therapy   -he reports that he has a diastasis  I would like him to discuss this with his primary care team   Ultimately my experience with this is limited however it would seem unlikely that that would resolve purely with exercise  I will plan for him to follow up with us in 8 months but would be happy to see him sooner if the need should arise  If he has any stroke-like symptoms such as sudden painless loss of vision or double vision, difficulty speaking or swallowing, vertigo/ room spinning that does not quickly resolve, or weakness / numbness affecting 1 side of the face or body he should proceed by ambulance to the nearest emergency room immediately  If he were to fall and strike his head and have any residual symptoms or to developed a sudden extremely severe very unusual headache he should likewise be seen in the nearest emergency room  He has been given instructions by his cardiologist to bridge with Lovenox in order to have his upcoming endoscopy  I agree that a Lovenox bridge is appropriate and he is cleared from a neurologic standpoint to have the endoscopy performed

## 2021-08-13 ENCOUNTER — HOSPITAL ENCOUNTER (OUTPATIENT)
Dept: NON INVASIVE DIAGNOSTICS | Facility: HOSPITAL | Age: 61
Discharge: HOME | End: 2021-08-13
Attending: PSYCHIATRY & NEUROLOGY
Payer: COMMERCIAL

## 2021-08-13 DIAGNOSIS — R29.898 WEAKNESS OF BOTH LOWER EXTREMITIES: ICD-10-CM

## 2021-08-13 PROCEDURE — 93923 UPR/LXTR ART STDY 3+ LVLS: CPT

## 2021-08-14 PROCEDURE — 93923 UPR/LXTR ART STDY 3+ LVLS: CPT | Performed by: SURGERY

## 2021-08-16 ENCOUNTER — TELEPHONE (OUTPATIENT)
Dept: FAMILY MEDICINE CLINIC | Facility: CLINIC | Age: 61
End: 2021-08-16

## 2021-08-16 ENCOUNTER — APPOINTMENT (OUTPATIENT)
Dept: LAB | Facility: HOSPITAL | Age: 61
End: 2021-08-16
Payer: COMMERCIAL

## 2021-08-16 ENCOUNTER — ANTICOAG VISIT (OUTPATIENT)
Dept: FAMILY MEDICINE CLINIC | Facility: CLINIC | Age: 61
End: 2021-08-16

## 2021-08-16 NOTE — TELEPHONE ENCOUNTER
Spoke to pt in reference to his INR results and recommendations  Pt advises that on 8/25/21 he has a procedure scheduled and was advised to stop his coumadin 5 days prior to procedure (8/19/21)  Please advise

## 2021-08-20 ENCOUNTER — OFFICE VISIT (OUTPATIENT)
Dept: UROLOGY | Facility: CLINIC | Age: 61
End: 2021-08-20
Payer: COMMERCIAL

## 2021-08-20 VITALS
BODY MASS INDEX: 39.1 KG/M2 | HEIGHT: 68 IN | SYSTOLIC BLOOD PRESSURE: 140 MMHG | DIASTOLIC BLOOD PRESSURE: 80 MMHG | WEIGHT: 258 LBS

## 2021-08-20 DIAGNOSIS — N20.0 CALCULUS OF KIDNEY: Primary | ICD-10-CM

## 2021-08-20 DIAGNOSIS — Z12.5 PROSTATE CANCER SCREENING: ICD-10-CM

## 2021-08-20 DIAGNOSIS — N28.1 RENAL CYST: ICD-10-CM

## 2021-08-20 PROCEDURE — 99214 OFFICE O/P EST MOD 30 MIN: CPT | Performed by: PHYSICIAN ASSISTANT

## 2021-08-20 NOTE — PROGRESS NOTES
8/20/2021      Chief Complaint   Patient presents with    Nephrolithiasis         Assessment and Plan    64 y o  male managed by Dr Earle Goss    1  Prostate cancer screening  - GRAYSON today smooth no nodule  Lab Results   Component Value Date    PSA 0 8 08/11/2021    PSA 0 8 07/29/2020    PSA 0 8 08/15/2019       2  Nephrolithiasis  - prior ESWL 2018  - possibly passed a small stone last fall  - CT 10/2020 stone free  - current US 8/2021 stone free, no hydro, normal bladder  - no current symptoms    3  Left renal cyst  - stable 1 3cm on current US    Doing well f/u 1 year psa/grayson and US    History of Present Illness  Marina Walter is a 64 y o  male here for evaluation of   Annual visit prostate cancer screening, stone disease, renal cyst   He is doing well  With no urinary complaint  Is on warfarin now for hypercoag state and stroke while on eliquis  He has not had any hematuria  No complaints  Review of Systems   Constitutional: Negative for activity change, appetite change, chills, fever and unexpected weight change  HENT: Negative  Respiratory: Negative  Negative for shortness of breath  Cardiovascular: Negative  Negative for chest pain  Gastrointestinal: Negative for abdominal pain, diarrhea, nausea and vomiting  Endocrine: Negative  Genitourinary: Negative for decreased urine volume, difficulty urinating, dysuria, flank pain, frequency, hematuria and urgency  Musculoskeletal: Negative for back pain and gait problem  Skin: Negative  Allergic/Immunologic: Negative  Neurological: Negative  Hematological: Negative for adenopathy  Does not bruise/bleed easily  Vitals  Vitals:    08/20/21 0831   BP: 140/80   Weight: 117 kg (258 lb)   Height: 5' 8" (1 727 m)       Physical Exam  Vitals and nursing note reviewed  Constitutional:       General: He is not in acute distress  Appearance: Normal appearance  He is well-developed  He is not diaphoretic     HENT: Head: Normocephalic and atraumatic  Pulmonary:      Effort: Pulmonary effort is normal       Comments: No cough or audible wheeze  Abdominal:      General: There is no distension  Tenderness: There is no abdominal tenderness  There is no right CVA tenderness or left CVA tenderness  Genitourinary:     Comments: Circumcised penis, normal phallus, orthotopic patent meatus  Testes smooth descended bilaterally into the scrotum nontender with no palpable mass  Digital rectal exam smooth prostate, without appreciable nodule, induration or asymmetr  Musculoskeletal:      Right lower leg: No edema  Left lower leg: No edema  Skin:     General: Skin is warm and dry  Neurological:      Mental Status: He is alert and oriented to person, place, and time        Gait: Gait normal    Psychiatric:         Speech: Speech normal          Behavior: Behavior normal            Past History  Past Medical History:   Diagnosis Date    Aneurysm of thoracic aorta ([facility] )     last assessed 11/20/17    Anxiety     currently on no meds    Cardiac disease     Cholelithiasis     Chronic kidney disease     GERD (gastroesophageal reflux disease)     Headache due to anesthesia     Hyperlipidemia     Hypertension     Irritable bowel syndrome     Kidney stone     Palpitations     PONV (postoperative nausea and vomiting)     Shortness of breath     ? related to acid reflux    Sleep apnea     not sure    Stroke [facility]) 11/2014    Stroke ([place] [address] ) 03/2021    TIA (transient ischemic attack)      Social History     Socioeconomic History    Marital status:      Spouse name: None    Number of children: None    Years of education: None    Highest education level: None   Occupational History    Occupation: disabled     Tobacco Use    Smoking status: Never Smoker    Smokeless tobacco: Never Used    Tobacco comment: no second hand smoke exposure   Vaping Use    Vaping Use: Never used   Substance and Sexual Activity    Alcohol use: Not Currently    Drug use: No    Sexual activity: None   Other Topics Concern    None   Social History Narrative    Caffeine use    Completed some college     as per [employer]     Social Determinants of Health     Financial Resource Strain:     Difficulty of Paying Living Expenses:    Food Insecurity:     Worried About Running Out of Food in the Last Year:     [address] in the Last Year:    Transportation Needs:     Lack of Transportation (Medical):      Lack of Transportation (Non-Medical):    Physical Activity:     Days of Exercise per Week:     Minutes of Exercise per Session:    Stress:     Feeling of Stress :    Social Connections:     Frequency of Communication with Friends and Family:     Frequency of Social Gatherings with Friends and Family:     Attends [employer] Services:     Active Member of Clubs or Organizations:     Attends Club or Organization Meetings:     Marital Status:    Intimate Partner Violence:     Fear of Current or Ex-Partner:     Emotionally Abused:     Physically Abused:     Sexually Abused:      Social History     Tobacco Use   Smoking Status Never Smoker   Smokeless Tobacco Never Used   Tobacco Comment    no second hand smoke exposure     Family History   Problem Relation Age of Onset    Diverticulitis Mother         of colon    Nephrolithiasis Mother     Emphysema Father     Nephrolithiasis Sister     Heart attack Maternal Grandfather 72    Breast cancer Paternal Grandmother     Lung cancer Paternal Grandfather     Cancer Family     Coronary artery disease Family     Diabetes Family         sibling    Hypertension Family         sibling    Hernia Family         sibling       The following portions of the patient's history were reviewed and updated as appropriate: allergies, current medications, past medical history, past social history, past surgical history and problem list     Results  No results found for this or any previous visit (from the past 1 hour(s)) ]  Lab Results   Component Value Date    PSA 0 8 08/11/2021    PSA 0 8 07/29/2020    PSA 0 8 08/15/2019     Lab Results   Component Value Date    GLUCOSE 103 12/08/2014    CALCIUM 8 5 08/09/2021     03/29/2017    K 3 8 08/09/2021    CO2 29 08/09/2021     08/09/2021    BUN 8 08/09/2021    CREATININE 1 11 08/09/2021     Lab Results   Component Value Date    WBC 9 13 05/21/2021    HGB 14 7 05/21/2021    HCT 43 2 05/21/2021    MCV 87 05/21/2021     05/21/2021

## 2021-08-24 ENCOUNTER — TELEPHONE (OUTPATIENT)
Dept: GASTROENTEROLOGY | Facility: HOSPITAL | Age: 61
End: 2021-08-24

## 2021-08-25 ENCOUNTER — ANESTHESIA EVENT (OUTPATIENT)
Dept: GASTROENTEROLOGY | Facility: HOSPITAL | Age: 61
End: 2021-08-25

## 2021-08-25 ENCOUNTER — ANESTHESIA (OUTPATIENT)
Dept: GASTROENTEROLOGY | Facility: HOSPITAL | Age: 61
End: 2021-08-25

## 2021-08-25 ENCOUNTER — HOSPITAL ENCOUNTER (OUTPATIENT)
Dept: GASTROENTEROLOGY | Facility: HOSPITAL | Age: 61
Setting detail: OUTPATIENT SURGERY
Discharge: HOME | End: 2021-08-25
Attending: INTERNAL MEDICINE
Payer: COMMERCIAL

## 2021-08-25 ENCOUNTER — TELEPHONE (OUTPATIENT)
Dept: NEUROLOGY | Facility: CLINIC | Age: 61
End: 2021-08-25

## 2021-08-25 ENCOUNTER — RA CDI HCC (OUTPATIENT)
Dept: OTHER | Facility: HOSPITAL | Age: 61
End: 2021-08-25

## 2021-08-25 VITALS
HEIGHT: 68 IN | HEART RATE: 55 BPM | RESPIRATION RATE: 18 BRPM | TEMPERATURE: 97.2 F | WEIGHT: 260 LBS | OXYGEN SATURATION: 95 % | SYSTOLIC BLOOD PRESSURE: 128 MMHG | DIASTOLIC BLOOD PRESSURE: 76 MMHG | BODY MASS INDEX: 39.4 KG/M2

## 2021-08-25 DIAGNOSIS — K21.9 GASTROESOPHAGEAL REFLUX DISEASE WITHOUT ESOPHAGITIS: ICD-10-CM

## 2021-08-25 PROCEDURE — 43270 EGD LESION ABLATION: CPT | Performed by: INTERNAL MEDICINE

## 2021-08-25 PROCEDURE — 43239 EGD BIOPSY SINGLE/MULTIPLE: CPT | Performed by: INTERNAL MEDICINE

## 2021-08-25 PROCEDURE — 88305 TISSUE EXAM BY PATHOLOGIST: CPT | Performed by: PATHOLOGY

## 2021-08-25 RX ORDER — GLYCOPYRROLATE 0.2 MG/ML
INJECTION INTRAMUSCULAR; INTRAVENOUS AS NEEDED
Status: DISCONTINUED | OUTPATIENT
Start: 2021-08-25 | End: 2021-08-25

## 2021-08-25 RX ORDER — SODIUM CHLORIDE 9 MG/ML
125 INJECTION, SOLUTION INTRAVENOUS CONTINUOUS
Status: DISCONTINUED | OUTPATIENT
Start: 2021-08-25 | End: 2021-08-29 | Stop reason: HOSPADM

## 2021-08-25 RX ORDER — LIDOCAINE HYDROCHLORIDE 10 MG/ML
INJECTION, SOLUTION EPIDURAL; INFILTRATION; INTRACAUDAL; PERINEURAL AS NEEDED
Status: DISCONTINUED | OUTPATIENT
Start: 2021-08-25 | End: 2021-08-25

## 2021-08-25 RX ORDER — FENTANYL CITRATE/PF 50 MCG/ML
25 SYRINGE (ML) INJECTION
Status: CANCELLED | OUTPATIENT
Start: 2021-08-25

## 2021-08-25 RX ORDER — ONDANSETRON 2 MG/ML
4 INJECTION INTRAMUSCULAR; INTRAVENOUS ONCE AS NEEDED
Status: CANCELLED | OUTPATIENT
Start: 2021-08-25

## 2021-08-25 RX ORDER — PROPOFOL 10 MG/ML
INJECTION, EMULSION INTRAVENOUS AS NEEDED
Status: DISCONTINUED | OUTPATIENT
Start: 2021-08-25 | End: 2021-08-25

## 2021-08-25 RX ADMIN — PROPOFOL 20 MG: 10 INJECTION, EMULSION INTRAVENOUS at 09:44

## 2021-08-25 RX ADMIN — GLYCOPYRROLATE 0.1 MG: 0.2 INJECTION, SOLUTION INTRAMUSCULAR; INTRAVENOUS at 09:38

## 2021-08-25 RX ADMIN — PROPOFOL 20 MG: 10 INJECTION, EMULSION INTRAVENOUS at 09:43

## 2021-08-25 RX ADMIN — PROPOFOL 90 MG: 10 INJECTION, EMULSION INTRAVENOUS at 09:40

## 2021-08-25 RX ADMIN — PROPOFOL 20 MG: 10 INJECTION, EMULSION INTRAVENOUS at 09:41

## 2021-08-25 RX ADMIN — LIDOCAINE HYDROCHLORIDE 100 MG: 10 INJECTION, SOLUTION EPIDURAL; INFILTRATION; INTRACAUDAL; PERINEURAL at 09:40

## 2021-08-25 RX ADMIN — PROPOFOL 30 MG: 10 INJECTION, EMULSION INTRAVENOUS at 09:45

## 2021-08-25 RX ADMIN — PROPOFOL 30 MG: 10 INJECTION, EMULSION INTRAVENOUS at 09:50

## 2021-08-25 RX ADMIN — PROPOFOL 20 MG: 10 INJECTION, EMULSION INTRAVENOUS at 09:47

## 2021-08-25 RX ADMIN — SODIUM CHLORIDE 125 ML/HR: 0.9 INJECTION, SOLUTION INTRAVENOUS at 09:18

## 2021-08-25 NOTE — TELEPHONE ENCOUNTER
Called and advised pt of all of the below  He verbalized clear understanding  Not agreeable to go to sleep center at this time  He is not interested getting a CPAP at this time  Advised to contact us if he changes his mind

## 2021-08-25 NOTE — ANESTHESIA POSTPROCEDURE EVALUATION
Post-Op Assessment Note    CV Status:  Stable  Pain Score: 0    Pain management: adequate     Mental Status:  Arousable and sleepy   Hydration Status:  Stable   PONV Controlled:  None   Airway Patency:  Patent      Post Op Vitals Reviewed: Yes      Staff: Anesthesiologist, CRNA         No complications documented      BP   135/73   Temp 97 2   Pulse 64   Resp 12   SpO2 97

## 2021-08-25 NOTE — PROGRESS NOTES
[address]  coding opportunities       Chart reviewed, no opportunity found: CHART REVIEWED, NO OPPORTUNITY FOUND     Noted bpa data-per neuro visit in April this year had a motor function 5/5-doesn't meet clinically for hemiplegia currently LM                     Patients insurance company: [employer] ([employer]

## 2021-08-25 NOTE — TELEPHONE ENCOUNTER
----- Message from Belkis Costello MD sent at 8/24/2021  6:46 PM EDT -----  Can you please see if Melani Felipe ever went to the sleep center to see about a CPAP for his sleep apnea which was seen on this study last year? I saw the old study and don't remember discussing it with him during our last visit  Orin Smith

## 2021-08-25 NOTE — ANESTHESIA PREPROCEDURE EVALUATION
Procedure:  EGD    Relevant Problems   CARDIO   (+) Atrial fibrillation (HCC)   (+) Hyperlipidemia   (+) Hypertension      GI/HEPATIC   (+) GERD (gastroesophageal reflux disease)      NEURO/PSYCH   (+) Anxiety disorder   (+) History of stroke   (+) Panic attack      PULMONARY   (+) CORIE (obstructive sleep apnea)      Hx of CVA with right sided weakness  Last dose of warafarin august 20  Took his amlodipine and metoprolol this am  Has PONV    Physical Exam    Airway    Mallampati score: III  TM Distance: >3 FB  Neck ROM: full     Dental   No notable dental hx     Cardiovascular  Rhythm: irregular, Rate: normal,     Pulmonary  Breath sounds clear to auscultation,     Other Findings    NPO since Monday Aug 23  Non smoker  CORIE but does not use CPAP    NGUYỄN  Systolic function was normal  Ejection fraction was estimated to be 60 %  Although no diagnostic regional wall motion abnormality was identified, this possibility cannot be completely excluded on the basis of this study  Wall thickness was mildly increased  There was mild concentric hypertrophy      LEFT ATRIAL APPENDAGE:  No thrombus was identified      ATRIAL SEPTUM:  No defect or patent foramen ovale was identified      MITRAL VALVE:  There was mild regurgitation      AORTIC VALVE:  The valve was bicuspid with right and left coronary cusp fusion  Leaflets exhibited moderately increased thickness  Transaortic velocity was increased due to valvular stenosis  There was mild stenosis  Valve mean gradient was 13 mmHg  Estimated aortic valve area (by Vmax) was 1 7 cmï¾²     TRICUSPID VALVE:  There was mild regurgitation  Anesthesia Plan  ASA Score- 3     Anesthesia Type- IV sedation with anesthesia with ASA Monitors  Additional Monitors:   Airway Plan:           Plan Factors-Exercise tolerance (METS): >4 METS  Chart reviewed  EKG reviewed  Imaging results reviewed  Patient summary reviewed  Patient is not a current smoker           Induction- intravenous  Postoperative Plan-     Informed Consent- Anesthetic plan and risks discussed with patient  I personally reviewed this patient with the CRNA  Discussed and agreed on the Anesthesia Plan with the CRNA  Odette Butterfield

## 2021-08-30 ENCOUNTER — APPOINTMENT (OUTPATIENT)
Dept: LAB | Facility: HOSPITAL | Age: 61
End: 2021-08-30
Payer: COMMERCIAL

## 2021-08-30 ENCOUNTER — ANTICOAG VISIT (OUTPATIENT)
Dept: FAMILY MEDICINE CLINIC | Facility: CLINIC | Age: 61
End: 2021-08-30

## 2021-09-01 ENCOUNTER — OFFICE VISIT (OUTPATIENT)
Dept: FAMILY MEDICINE CLINIC | Facility: CLINIC | Age: 61
End: 2021-09-01
Payer: COMMERCIAL

## 2021-09-01 VITALS
SYSTOLIC BLOOD PRESSURE: 138 MMHG | HEIGHT: 68 IN | BODY MASS INDEX: 39.5 KG/M2 | HEART RATE: 68 BPM | WEIGHT: 260.6 LBS | DIASTOLIC BLOOD PRESSURE: 78 MMHG

## 2021-09-01 DIAGNOSIS — Z79.01 ANTICOAGULATED ON COUMADIN: ICD-10-CM

## 2021-09-01 DIAGNOSIS — R60.0 EDEMA OF BOTH LOWER LEGS: ICD-10-CM

## 2021-09-01 DIAGNOSIS — K63.5 POLYP OF COLON, UNSPECIFIED PART OF COLON, UNSPECIFIED TYPE: ICD-10-CM

## 2021-09-01 DIAGNOSIS — K21.9 GASTROESOPHAGEAL REFLUX DISEASE WITHOUT ESOPHAGITIS: Primary | ICD-10-CM

## 2021-09-01 DIAGNOSIS — I63.9 STROKE (HCC): ICD-10-CM

## 2021-09-01 DIAGNOSIS — E55.9 VITAMIN D DEFICIENCY: ICD-10-CM

## 2021-09-01 DIAGNOSIS — I48.0 PAROXYSMAL ATRIAL FIBRILLATION (HCC): ICD-10-CM

## 2021-09-01 DIAGNOSIS — R04.2 SPITTING UP BLOOD: ICD-10-CM

## 2021-09-01 DIAGNOSIS — I10 ESSENTIAL HYPERTENSION: ICD-10-CM

## 2021-09-01 DIAGNOSIS — E66.01 CLASS 2 SEVERE OBESITY DUE TO EXCESS CALORIES WITH SERIOUS COMORBIDITY AND BODY MASS INDEX (BMI) OF 39.0 TO 39.9 IN ADULT (HCC): ICD-10-CM

## 2021-09-01 DIAGNOSIS — Z86.73 HISTORY OF STROKE: ICD-10-CM

## 2021-09-01 DIAGNOSIS — K31.819 GASTRIC AVM: ICD-10-CM

## 2021-09-01 DIAGNOSIS — E78.2 MIXED HYPERLIPIDEMIA: Chronic | ICD-10-CM

## 2021-09-01 DIAGNOSIS — K31.7 GASTRIC POLYPS: ICD-10-CM

## 2021-09-01 DIAGNOSIS — G47.33 OSA (OBSTRUCTIVE SLEEP APNEA): ICD-10-CM

## 2021-09-01 DIAGNOSIS — N20.0 KIDNEY STONES: ICD-10-CM

## 2021-09-01 PROCEDURE — 3008F BODY MASS INDEX DOCD: CPT | Performed by: FAMILY MEDICINE

## 2021-09-01 PROCEDURE — 1036F TOBACCO NON-USER: CPT | Performed by: FAMILY MEDICINE

## 2021-09-01 PROCEDURE — 99214 OFFICE O/P EST MOD 30 MIN: CPT | Performed by: FAMILY MEDICINE

## 2021-09-01 RX ORDER — WARFARIN SODIUM 5 MG/1
5 TABLET ORAL
Qty: 90 TABLET | Refills: 1 | Status: SHIPPED | OUTPATIENT
Start: 2021-09-01 | End: 2021-12-08 | Stop reason: SDUPTHER

## 2021-09-01 NOTE — ASSESSMENT & PLAN NOTE
Resolved after AVM coagulation and EGD, though GI reports that was not any evidence of the AVM bleeding  There was some gastritis noted, which could have been the cause  Continue on pantoprazole

## 2021-09-01 NOTE — ASSESSMENT & PLAN NOTE
In cholesterol is doing extremely well with the atorvastatin  It is likely that the slight increase in alkaline phosphatase is related to using a atorvastatin  I would not make any adjustments, recheck in 6 months  Of note, the isoenzymes showed normal balance pattern with alk phos

## 2021-09-01 NOTE — PATIENT INSTRUCTIONS
Problem List Items Addressed This Visit     Anticoagulated on Coumadin     Patient continues on Coumadin  Aspirin per Cardiology and Neurology  Atrial fibrillation (HCC)     Stable  Heart rate today was reasonable  Continue on warfarin  Follow with Cardiology  Colon polyp     Colon polyp previously  Follow-up with gastroenterology in the future  No change at the moment  Edema of both lower legs     Patient does have some pitting edema of the lower extremities bilaterally  This could be related to amlodipine, but usually it only occurs at 10 mg or higher  For the time being, observe  Watch for weight changes  Should check weight every day, or every other day, if he is gaining 3 or 4 lb per 2-3 days, would need to contact this office to discuss diuresis and possible CHF issues, and at that point would talk about changing from amlodipine to diltiazem  Gastric AVM     Patient did have a coagulation of an AVM  At this point, he no longer has the coughing up of blood  Will follow with GI as scheduled  Gastric polyps     Gastric polyps removed per note from EGD  Follow-up with GI as scheduled  GERD (gastroesophageal reflux disease) - Primary     On pantoprazole  Of note, the EGD did find a small ulceration  Again, follow with GI, continue with pantoprazole  History of stroke     Stable  Follow with Neurology  Hyperlipidemia (Chronic)     In cholesterol is doing extremely well with the atorvastatin  It is likely that the slight increase in alkaline phosphatase is related to using a atorvastatin  I would not make any adjustments, recheck in 6 months  Of note, the isoenzymes showed normal balance pattern with alk phos           Relevant Orders    Cholesterol, total    Comprehensive metabolic panel    HDL cholesterol    LDL cholesterol, direct    Hypertension     Blood pressure today was reasonable, would recommend limiting sodium and caffeine a bit          Relevant Orders    Comprehensive metabolic panel    Kidney stones     Patient does have a history of kidney stones, but he is most recent check with Urology was quite good  He will continue to follow with Urology  Obesity     BMI is slightly elevated versus prior  Recommend increase exercise, limit carbs  CORIE (obstructive sleep apnea)     Sleep apnea:  Patient does have sleep apnea per discussion with Neurology  Reviewed rationale for using CPAP, as well as some of the concerns about it  I would recommend follow-up with sleep specialist for possible CPAP trials  RESOLVED: Spitting up blood     Resolved after AVM coagulation and EGD, though GI reports that was not any evidence of the AVM bleeding  There was some gastritis noted, which could have been the cause  Continue on pantoprazole  Vitamin D deficiency     Check in the future  Relevant Orders    Vitamin D 25 hydroxy      Other Visit Diagnoses     Stroke ([facility] [address] )        Relevant Medications    warfarin (COUMADIN) 5 mg tablet          COVID 19 Instructions    Kaz Baldwin was advised to limit contact with others to essential tasks such as getting food, medications, and medical care  Proper handwashing reviewed, and Hand sanitzer when washing is not available  If the patient develops symptoms of COVID 19, the patient should call the office as soon as possible  For 1348-0377 Flu season, it is strongly recommended that Flu Vaccinations be obtained  Virtual Visits may be conducted in several ways: Cr: You should get a text message when the provider is ready to see you  Click on the link in the text message, and the call should start  You will need to type in your name, and allow camera and microphone access  This is HIPPA compliant, and secure  Please try to download Google Duo    Once you do download this on your phone, you will be prompted to add your phone number to the account  After that, he should receive a text from SecureAuth, and use that code to verify your phone number  After that, you should be able to use Google Duo to receive and make video calls  Please download Microsoft Teams to your phone or computer  You would get an email with the meeting after scheduling with the office  You will Join the meeting, and wait there till the provider joins as well  Instructions for downloading this are available from the office  This is HIPPA compliant, and secure  Please get immunized to COVID 19  We are committed to getting you vaccinated as soon as possible and will be closely following CDC and [facility]  guidelines as they are released and revised  Please refer to our COVID-19 vaccine webpage for the most up to date information on the vaccine and our distribution efforts  This site will also have the most up to date recommendations for COVID booster vaccine  [employer] tn    OUR NEW LOCATION:  Our new location and phone are:    [address], [address] [place], [place], [address]  Fax: [phone]    Lab services and OB/GYN are at this location as well

## 2021-09-01 NOTE — PROGRESS NOTES
Assessment and Plan:    Problem List Items Addressed This Visit     Anticoagulated on Coumadin     Patient continues on Coumadin  Aspirin per Cardiology and Neurology  Atrial fibrillation (HCC)     Stable  Heart rate today was reasonable  Continue on warfarin  Follow with Cardiology  Colon polyp     Colon polyp previously  Follow-up with gastroenterology in the future  No change at the moment  Edema of both lower legs     Patient does have some pitting edema of the lower extremities bilaterally  This could be related to amlodipine, but usually it only occurs at 10 mg or higher  For the time being, observe  Watch for weight changes  Should check weight every day, or every other day, if he is gaining 3 or 4 lb per 2-3 days, would need to contact this office to discuss diuresis and possible CHF issues, and at that point would talk about changing from amlodipine to diltiazem  Gastric AVM     Patient did have a coagulation of an AVM  At this point, he no longer has the coughing up of blood  Will follow with GI as scheduled  Gastric polyps     Gastric polyps removed per note from EGD  Follow-up with GI as scheduled  GERD (gastroesophageal reflux disease) - Primary     On pantoprazole  Of note, the EGD did find a small ulceration  Again, follow with GI, continue with pantoprazole  History of stroke     Stable  Follow with Neurology  Hyperlipidemia (Chronic)     In cholesterol is doing extremely well with the atorvastatin  It is likely that the slight increase in alkaline phosphatase is related to using a atorvastatin  I would not make any adjustments, recheck in 6 months  Of note, the isoenzymes showed normal balance pattern with alk phos           Relevant Orders    Cholesterol, total    Comprehensive metabolic panel    HDL cholesterol    LDL cholesterol, direct    Hypertension     Blood pressure today was reasonable, would recommend limiting sodium and caffeine a bit  Relevant Orders    Comprehensive metabolic panel    Kidney stones     Patient does have a history of kidney stones, but he is most recent check with Urology was quite good  He will continue to follow with Urology  Obesity     BMI is slightly elevated versus prior  Recommend increase exercise, limit carbs  CORIE (obstructive sleep apnea)     Sleep apnea:  Patient does have sleep apnea per discussion with Neurology  Reviewed rationale for using CPAP, as well as some of the concerns about it  I would recommend follow-up with sleep specialist for possible CPAP trials  RESOLVED: Spitting up blood     Resolved after AVM coagulation and EGD, though GI reports that was not any evidence of the AVM bleeding  There was some gastritis noted, which could have been the cause  Continue on pantoprazole  Vitamin D deficiency     Check in the future  Relevant Orders    Vitamin D 25 hydroxy      Other Visit Diagnoses     Stroke ([facility] )        Relevant Medications    warfarin (COUMADIN) 5 mg tablet                 Diagnoses and all orders for this visit:    Gastroesophageal reflux disease without esophagitis    Stroke ([facility] )  -     warfarin (COUMADIN) 5 mg tablet; Take 1 tablet (5 mg total) by mouth daily    Essential hypertension  -     Comprehensive metabolic panel; Future    Mixed hyperlipidemia  -     Cholesterol, total; Future  -     Comprehensive metabolic panel; Future  -     HDL cholesterol; Future  -     LDL cholesterol, direct;  Future    Anticoagulated on Coumadin    Paroxysmal atrial fibrillation (HCC)    History of stroke    Gastric polyps    Gastric AVM    Polyp of colon, unspecified part of colon, unspecified type    Class 2 severe obesity due to excess calories with serious comorbidity and body mass index (BMI) of 39 0 to 39 9 in adult [facility])    Spitting up blood    CORIE (obstructive sleep apnea)    Kidney stones    Vitamin D deficiency  -     Vitamin D 25 hydroxy; Future    Edema of both lower legs              Subjective:      Patient ID: Issac Monte is a 64 y o  male  CC:    Chief Complaint   Patient presents with    Follow-up     Routine Follow up  Pt has had some testing done recently  kw       HPI:    Patient had EGD  Had cautery of vessel  Has had GI follow up  EGD reviewed  There were gastric polyps, as well as an AVM  Gastric polyps appear to been removed  AVM was coagulated  Atrial fibrillation:  Patient remains on warfarin anticoagulation secondary to prior failure with novel anticoagulants  CAD: No current problems  CVA, old  Following with Neuro  He reports that he was asked about stopping ASA  Hyperlipidemia: Labs reviewed  On Lipitor  Hypertension: Norvasc  Metoprolol  GERD: Using Protonix  Patient did have evaluation with Urology recently  No kidney stone issues, patient reports REGI was normal, and PSA was low  Reviewed PSA lab  The following portions of the patient's history were reviewed and updated as appropriate: allergies, current medications, past family history, past medical history, past social history, past surgical history and problem list       Review of Systems   Constitutional: Negative  HENT: Negative  Eyes: Negative  Respiratory: Negative  Cardiovascular: Negative  Gastrointestinal: Negative  Endocrine: Negative  Genitourinary: Negative  Musculoskeletal: Negative  Skin: Negative  Allergic/Immunologic: Negative  Neurological: Negative  Hematological: Negative  Psychiatric/Behavioral: Negative  Data to review:   Sodium 143, potassium 3 8, calcium 8 5  Blood sugar 113  Creatinine 1 11, GFR:  71  AST 24, ALT 45  Alk-phos 159  Alk-phos isoenzymes show total of 156, but liver, bone, intestine are all reasonable percentages  Cholesterol 126, LDL 72, HDL 37, TG 83  Vit D 38 8    PSA 0 8       Objective:    Vitals:    21 0932   BP: 138/78   BP Location: Left arm   Patient Position: Sitting   Pulse: 68   Weight: 118 kg (260 lb 9 6 oz)   Height: 5' 8" (1 727 m)        Physical Exam  Vitals and nursing note reviewed  Constitutional:       Appearance: Normal appearance  Neck:      Vascular: No carotid bruit  Cardiovascular:      Rate and Rhythm: Normal rate and regular rhythm  Pulses: Normal pulses  Carotid pulses are 2+ on the right side and 2+ on the left side  Heart sounds: Normal heart sounds  No murmur heard  No gallop  Pulmonary:      Effort: Pulmonary effort is normal  No respiratory distress  Breath sounds: Normal breath sounds  No stridor  No wheezing, rhonchi or rales  Chest:      Chest wall: No tenderness  Musculoskeletal:      Right lower le+ Pitting Edema present  Left lower le+ Pitting Edema present  Neurological:      Mental Status: He is alert

## 2021-09-01 NOTE — ASSESSMENT & PLAN NOTE
Sleep apnea:  Patient does have sleep apnea per discussion with Neurology  Reviewed rationale for using CPAP, as well as some of the concerns about it  I would recommend follow-up with sleep specialist for possible CPAP trials

## 2021-09-01 NOTE — ASSESSMENT & PLAN NOTE
Patient does have a history of kidney stones, but he is most recent check with Urology was quite good  He will continue to follow with Urology

## 2021-09-01 NOTE — ASSESSMENT & PLAN NOTE
Patient did have a coagulation of an AVM  At this point, he no longer has the coughing up of blood  Will follow with GI as scheduled

## 2021-09-01 NOTE — ASSESSMENT & PLAN NOTE
On pantoprazole  Of note, the EGD did find a small ulceration  Again, follow with GI, continue with pantoprazole

## 2021-09-02 ENCOUNTER — ANTICOAG VISIT (OUTPATIENT)
Dept: FAMILY MEDICINE CLINIC | Facility: CLINIC | Age: 61
End: 2021-09-02

## 2021-09-02 ENCOUNTER — APPOINTMENT (OUTPATIENT)
Dept: LAB | Facility: HOSPITAL | Age: 61
End: 2021-09-02
Payer: COMMERCIAL

## 2021-09-02 LAB — INR PPP: 1.72 (ref 0.84–1.19)

## 2021-09-13 ENCOUNTER — ANTICOAG VISIT (OUTPATIENT)
Dept: FAMILY MEDICINE CLINIC | Facility: CLINIC | Age: 61
End: 2021-09-13

## 2021-09-13 ENCOUNTER — APPOINTMENT (OUTPATIENT)
Dept: LAB | Facility: HOSPITAL | Age: 61
End: 2021-09-13
Payer: COMMERCIAL

## 2021-09-14 ENCOUNTER — REMOTE DEVICE CLINIC VISIT (OUTPATIENT)
Dept: CARDIOLOGY CLINIC | Facility: CLINIC | Age: 61
End: 2021-09-14
Payer: COMMERCIAL

## 2021-09-14 DIAGNOSIS — Z95.818 PRESENCE OF OTHER CARDIAC IMPLANTS AND GRAFTS: Primary | ICD-10-CM

## 2021-09-14 PROCEDURE — G2066 INTER DEVC REMOTE 30D: HCPCS | Performed by: INTERNAL MEDICINE

## 2021-09-14 PROCEDURE — 93298 REM INTERROG DEV EVAL SCRMS: CPT | Performed by: INTERNAL MEDICINE

## 2021-09-14 NOTE — PROGRESS NOTES
CARELINK TRANSMISSION: LOOP RECORDER  PRESENTING RHYTHM NSR @ 61 BPM  BATTERY STATUS "OK " 8 AF EPISODES W/ EGRAMS SUGGESTING SR W/ PACs AND PVCs [BIGEMINL AT TIMES]   CAN NOT R/O AF   AF BURDEN = 0%  NO PATIENT ACTIVATED EPISODES  NORMAL DEVICE FUNCTION   DL

## 2021-09-18 DIAGNOSIS — E78.5 HYPERLIPIDEMIA: Chronic | ICD-10-CM

## 2021-09-20 ENCOUNTER — APPOINTMENT (OUTPATIENT)
Dept: LAB | Facility: HOSPITAL | Age: 61
End: 2021-09-20
Payer: COMMERCIAL

## 2021-09-20 ENCOUNTER — ANTICOAG VISIT (OUTPATIENT)
Dept: FAMILY MEDICINE CLINIC | Facility: CLINIC | Age: 61
End: 2021-09-20

## 2021-09-20 RX ORDER — ATORVASTATIN CALCIUM 40 MG/1
TABLET, FILM COATED ORAL
Qty: 90 TABLET | Refills: 1 | Status: SHIPPED | OUTPATIENT
Start: 2021-09-20 | End: 2022-03-28

## 2021-09-26 DIAGNOSIS — K21.9 GERD (GASTROESOPHAGEAL REFLUX DISEASE): ICD-10-CM

## 2021-09-27 ENCOUNTER — APPOINTMENT (OUTPATIENT)
Dept: LAB | Facility: HOSPITAL | Age: 61
End: 2021-09-27
Payer: COMMERCIAL

## 2021-09-27 ENCOUNTER — ANTICOAG VISIT (OUTPATIENT)
Dept: FAMILY MEDICINE CLINIC | Facility: CLINIC | Age: 61
End: 2021-09-27

## 2021-09-27 LAB — INR PPP: 2.21 (ref 0.84–1.19)

## 2021-09-27 RX ORDER — PANTOPRAZOLE SODIUM 40 MG/1
TABLET, DELAYED RELEASE ORAL
Qty: 90 TABLET | Refills: 1 | Status: SHIPPED | OUTPATIENT
Start: 2021-09-27 | End: 2022-03-28

## 2021-10-04 ENCOUNTER — APPOINTMENT (OUTPATIENT)
Dept: LAB | Facility: HOSPITAL | Age: 61
End: 2021-10-04
Payer: COMMERCIAL

## 2021-10-04 ENCOUNTER — ANTICOAG VISIT (OUTPATIENT)
Dept: FAMILY MEDICINE CLINIC | Facility: CLINIC | Age: 61
End: 2021-10-04

## 2021-10-08 ENCOUNTER — OFFICE VISIT (OUTPATIENT)
Dept: GASTROENTEROLOGY | Facility: CLINIC | Age: 61
End: 2021-10-08
Payer: COMMERCIAL

## 2021-10-08 VITALS
BODY MASS INDEX: 39.01 KG/M2 | WEIGHT: 257.4 LBS | HEART RATE: 61 BPM | TEMPERATURE: 97 F | SYSTOLIC BLOOD PRESSURE: 140 MMHG | HEIGHT: 68 IN | DIASTOLIC BLOOD PRESSURE: 80 MMHG

## 2021-10-08 DIAGNOSIS — K31.819 GASTRIC AVM: ICD-10-CM

## 2021-10-08 DIAGNOSIS — K21.9 GASTROESOPHAGEAL REFLUX DISEASE WITHOUT ESOPHAGITIS: ICD-10-CM

## 2021-10-08 DIAGNOSIS — R10.31 RIGHT GROIN PAIN: Primary | ICD-10-CM

## 2021-10-08 PROCEDURE — 99214 OFFICE O/P EST MOD 30 MIN: CPT | Performed by: PHYSICIAN ASSISTANT

## 2021-10-09 ENCOUNTER — HOSPITAL ENCOUNTER (OUTPATIENT)
Dept: ULTRASOUND IMAGING | Facility: HOSPITAL | Age: 61
Discharge: HOME | End: 2021-10-09
Payer: COMMERCIAL

## 2021-10-09 DIAGNOSIS — R10.31 RIGHT GROIN PAIN: ICD-10-CM

## 2021-10-09 PROCEDURE — 76870 US EXAM SCROTUM: CPT

## 2021-10-11 ENCOUNTER — APPOINTMENT (OUTPATIENT)
Dept: LAB | Facility: HOSPITAL | Age: 61
End: 2021-10-11
Payer: COMMERCIAL

## 2021-10-11 ENCOUNTER — ANTICOAG VISIT (OUTPATIENT)
Dept: FAMILY MEDICINE CLINIC | Facility: CLINIC | Age: 61
End: 2021-10-11

## 2021-10-11 LAB — INR PPP: 1.96 (ref 0.84–1.19)

## 2021-10-18 ENCOUNTER — APPOINTMENT (OUTPATIENT)
Dept: LAB | Facility: HOSPITAL | Age: 61
End: 2021-10-18
Payer: COMMERCIAL

## 2021-10-18 ENCOUNTER — ANTICOAG VISIT (OUTPATIENT)
Dept: FAMILY MEDICINE CLINIC | Facility: CLINIC | Age: 61
End: 2021-10-18

## 2021-10-18 ENCOUNTER — TELEPHONE (OUTPATIENT)
Dept: FAMILY MEDICINE CLINIC | Facility: CLINIC | Age: 61
End: 2021-10-18

## 2021-10-18 DIAGNOSIS — D68.61 ANTIPHOSPHOLIPID ANTIBODY WITH HYPERCOAGULABLE STATE (HCC): ICD-10-CM

## 2021-10-18 DIAGNOSIS — I48.0 PAROXYSMAL ATRIAL FIBRILLATION (HCC): ICD-10-CM

## 2021-10-18 DIAGNOSIS — Z79.01 ANTICOAGULATED ON COUMADIN: Primary | ICD-10-CM

## 2021-10-18 RX ORDER — WARFARIN SODIUM 1 MG/1
TABLET ORAL
Qty: 30 TABLET | Refills: 0 | Status: SHIPPED | OUTPATIENT
Start: 2021-10-18 | End: 2021-11-12

## 2021-10-25 ENCOUNTER — ANTICOAG VISIT (OUTPATIENT)
Dept: FAMILY MEDICINE CLINIC | Facility: CLINIC | Age: 61
End: 2021-10-25

## 2021-10-25 ENCOUNTER — OFFICE VISIT (OUTPATIENT)
Dept: FAMILY MEDICINE CLINIC | Facility: CLINIC | Age: 61
End: 2021-10-25
Payer: COMMERCIAL

## 2021-10-25 ENCOUNTER — APPOINTMENT (OUTPATIENT)
Dept: LAB | Facility: HOSPITAL | Age: 61
End: 2021-10-25
Payer: COMMERCIAL

## 2021-10-25 VITALS
HEART RATE: 66 BPM | WEIGHT: 260.8 LBS | SYSTOLIC BLOOD PRESSURE: 138 MMHG | DIASTOLIC BLOOD PRESSURE: 82 MMHG | TEMPERATURE: 97.2 F | HEIGHT: 68 IN | BODY MASS INDEX: 39.53 KG/M2

## 2021-10-25 DIAGNOSIS — I63.9 STROKE (HCC): ICD-10-CM

## 2021-10-25 DIAGNOSIS — K40.90 NON-RECURRENT UNILATERAL INGUINAL HERNIA WITHOUT OBSTRUCTION OR GANGRENE: ICD-10-CM

## 2021-10-25 DIAGNOSIS — I48.0 PAROXYSMAL ATRIAL FIBRILLATION (HCC): Primary | ICD-10-CM

## 2021-10-25 DIAGNOSIS — D68.61 ANTIPHOSPHOLIPID ANTIBODY WITH HYPERCOAGULABLE STATE (HCC): ICD-10-CM

## 2021-10-25 DIAGNOSIS — Z79.01 ANTICOAGULATED ON COUMADIN: ICD-10-CM

## 2021-10-25 PROCEDURE — 3008F BODY MASS INDEX DOCD: CPT | Performed by: FAMILY MEDICINE

## 2021-10-25 PROCEDURE — 3079F DIAST BP 80-89 MM HG: CPT | Performed by: FAMILY MEDICINE

## 2021-10-25 PROCEDURE — 3075F SYST BP GE 130 - 139MM HG: CPT | Performed by: FAMILY MEDICINE

## 2021-10-25 PROCEDURE — 1036F TOBACCO NON-USER: CPT | Performed by: FAMILY MEDICINE

## 2021-10-25 PROCEDURE — 99214 OFFICE O/P EST MOD 30 MIN: CPT | Performed by: FAMILY MEDICINE

## 2021-10-25 RX ORDER — AMOXICILLIN 500 MG/1
CAPSULE ORAL
COMMUNITY
Start: 2021-10-13

## 2021-11-02 ENCOUNTER — APPOINTMENT (OUTPATIENT)
Dept: LAB | Facility: HOSPITAL | Age: 61
End: 2021-11-02
Payer: COMMERCIAL

## 2021-11-02 ENCOUNTER — ANTICOAG VISIT (OUTPATIENT)
Dept: FAMILY MEDICINE CLINIC | Facility: CLINIC | Age: 61
End: 2021-11-02

## 2021-11-03 ENCOUNTER — TELEPHONE (OUTPATIENT)
Dept: FAMILY MEDICINE CLINIC | Facility: CLINIC | Age: 61
End: 2021-11-03

## 2021-11-03 ENCOUNTER — CONSULT (OUTPATIENT)
Dept: SURGERY | Facility: CLINIC | Age: 61
End: 2021-11-03
Payer: COMMERCIAL

## 2021-11-03 VITALS
SYSTOLIC BLOOD PRESSURE: 143 MMHG | WEIGHT: 282.2 LBS | BODY MASS INDEX: 42.77 KG/M2 | HEIGHT: 68 IN | TEMPERATURE: 97 F | HEART RATE: 52 BPM | DIASTOLIC BLOOD PRESSURE: 84 MMHG | RESPIRATION RATE: 19 BRPM

## 2021-11-03 DIAGNOSIS — Z79.01 ANTICOAGULATED ON COUMADIN: ICD-10-CM

## 2021-11-03 DIAGNOSIS — Z86.73 HISTORY OF STROKE: ICD-10-CM

## 2021-11-03 DIAGNOSIS — K40.90 INGUINAL HERNIA OF RIGHT SIDE WITHOUT OBSTRUCTION OR GANGRENE: ICD-10-CM

## 2021-11-03 DIAGNOSIS — K40.20 BILATERAL INGUINAL HERNIA WITHOUT OBSTRUCTION OR GANGRENE, RECURRENCE NOT SPECIFIED: Primary | ICD-10-CM

## 2021-11-03 PROCEDURE — 99204 OFFICE O/P NEW MOD 45 MIN: CPT | Performed by: SURGERY

## 2021-11-03 RX ORDER — CEFAZOLIN SODIUM 2 G/50ML
2000 SOLUTION INTRAVENOUS ONCE
Status: CANCELLED | OUTPATIENT
Start: 2021-12-09 | End: 2021-12-09

## 2021-11-03 RX ORDER — SODIUM CHLORIDE, SODIUM LACTATE, POTASSIUM CHLORIDE, CALCIUM CHLORIDE 600; 310; 30; 20 MG/100ML; MG/100ML; MG/100ML; MG/100ML
125 INJECTION, SOLUTION INTRAVENOUS CONTINUOUS
Status: CANCELLED | OUTPATIENT
Start: 2021-12-09

## 2021-11-05 ENCOUNTER — HOSPITAL ENCOUNTER (OUTPATIENT)
Dept: NON INVASIVE DIAGNOSTICS | Facility: HOSPITAL | Age: 61
Discharge: HOME | End: 2021-11-05
Attending: SURGERY
Payer: COMMERCIAL

## 2021-11-05 ENCOUNTER — APPOINTMENT (OUTPATIENT)
Dept: LAB | Facility: HOSPITAL | Age: 61
End: 2021-11-05
Attending: SURGERY
Payer: COMMERCIAL

## 2021-11-05 DIAGNOSIS — K40.20 BILATERAL INGUINAL HERNIA WITHOUT OBSTRUCTION OR GANGRENE, RECURRENCE NOT SPECIFIED: ICD-10-CM

## 2021-11-05 LAB
APTT PPP: 50 SECONDS (ref 23–37)
BASOPHILS # BLD AUTO: 0.03 THOUSANDS/ΜL (ref 0–0.1)
BASOPHILS NFR BLD AUTO: 0 % (ref 0–1)
EOSINOPHIL # BLD AUTO: 0.33 THOUSAND/ΜL (ref 0–0.61)
EOSINOPHIL NFR BLD AUTO: 4 % (ref 0–6)
ERYTHROCYTE [DISTWIDTH] IN BLOOD BY AUTOMATED COUNT: 13.8 % (ref 11.6–15.1)
HCT VFR BLD AUTO: 45.1 % (ref 36.5–49.3)
HGB BLD-MCNC: 15.1 G/DL (ref 12–17)
IMM GRANULOCYTES # BLD AUTO: 0.04 THOUSAND/UL (ref 0–0.2)
IMM GRANULOCYTES NFR BLD AUTO: 1 % (ref 0–2)
INR PPP: 2.97 (ref 0.84–1.19)
LYMPHOCYTES # BLD AUTO: 1.48 THOUSANDS/ΜL (ref 0.6–4.47)
LYMPHOCYTES NFR BLD AUTO: 18 % (ref 14–44)
MCH RBC QN AUTO: 30.3 PG (ref 26.8–34.3)
MCHC RBC AUTO-ENTMCNC: 33.5 G/DL (ref 31.4–37.4)
MCV RBC AUTO: 91 FL (ref 82–98)
MONOCYTES # BLD AUTO: 0.63 THOUSAND/ΜL (ref 0.17–1.22)
MONOCYTES NFR BLD AUTO: 8 % (ref 4–12)
NEUTROPHILS # BLD AUTO: 5.79 THOUSANDS/ΜL (ref 1.85–7.62)
NEUTS SEG NFR BLD AUTO: 69 % (ref 43–75)
NRBC BLD AUTO-RTO: 0 /100 WBCS
PLATELET # BLD AUTO: 182 THOUSANDS/UL (ref 149–390)
PMV BLD AUTO: 10.4 FL (ref 8.9–12.7)
RBC # BLD AUTO: 4.98 MILLION/UL (ref 3.88–5.62)
WBC # BLD AUTO: 8.3 THOUSAND/UL (ref 4.31–10.16)

## 2021-11-05 PROCEDURE — 85730 THROMBOPLASTIN TIME PARTIAL: CPT

## 2021-11-05 PROCEDURE — 93005 ELECTROCARDIOGRAM TRACING: CPT

## 2021-11-05 PROCEDURE — 85025 COMPLETE CBC W/AUTO DIFF WBC: CPT

## 2021-11-05 PROCEDURE — 36415 COLL VENOUS BLD VENIPUNCTURE: CPT

## 2021-11-06 ENCOUNTER — ANTICOAG VISIT (OUTPATIENT)
Dept: FAMILY MEDICINE CLINIC | Facility: CLINIC | Age: 61
End: 2021-11-06

## 2021-11-08 LAB
ATRIAL RATE: 53 BPM
P AXIS: 47 DEGREES
PR INTERVAL: 162 MS
QRS AXIS: 16 DEGREES
QRSD INTERVAL: 82 MS
QT INTERVAL: 414 MS
QTC INTERVAL: 388 MS
T WAVE AXIS: 36 DEGREES
VENTRICULAR RATE: 53 BPM

## 2021-11-08 PROCEDURE — 93010 ELECTROCARDIOGRAM REPORT: CPT | Performed by: INTERNAL MEDICINE

## 2021-11-10 ENCOUNTER — OFFICE VISIT (OUTPATIENT)
Dept: CARDIOLOGY CLINIC | Facility: CLINIC | Age: 61
End: 2021-11-10
Payer: COMMERCIAL

## 2021-11-10 VITALS
DIASTOLIC BLOOD PRESSURE: 82 MMHG | SYSTOLIC BLOOD PRESSURE: 136 MMHG | BODY MASS INDEX: 39.34 KG/M2 | HEIGHT: 68 IN | WEIGHT: 259.6 LBS | HEART RATE: 60 BPM | RESPIRATION RATE: 16 BRPM

## 2021-11-10 DIAGNOSIS — I63.9 CEREBROVASCULAR ACCIDENT (CVA), UNSPECIFIED MECHANISM (HCC): ICD-10-CM

## 2021-11-10 DIAGNOSIS — Z01.810 PREOP CARDIOVASCULAR EXAM: Primary | ICD-10-CM

## 2021-11-10 DIAGNOSIS — K40.20 BILATERAL INGUINAL HERNIA WITHOUT OBSTRUCTION OR GANGRENE, RECURRENCE NOT SPECIFIED: ICD-10-CM

## 2021-11-10 PROCEDURE — 3008F BODY MASS INDEX DOCD: CPT | Performed by: INTERNAL MEDICINE

## 2021-11-10 PROCEDURE — 1036F TOBACCO NON-USER: CPT | Performed by: INTERNAL MEDICINE

## 2021-11-10 PROCEDURE — 99215 OFFICE O/P EST HI 40 MIN: CPT | Performed by: INTERNAL MEDICINE

## 2021-11-10 PROCEDURE — 3075F SYST BP GE 130 - 139MM HG: CPT | Performed by: INTERNAL MEDICINE

## 2021-11-10 PROCEDURE — 3079F DIAST BP 80-89 MM HG: CPT | Performed by: INTERNAL MEDICINE

## 2021-11-12 DIAGNOSIS — I48.0 PAROXYSMAL ATRIAL FIBRILLATION (HCC): ICD-10-CM

## 2021-11-12 DIAGNOSIS — Z79.01 ANTICOAGULATED ON COUMADIN: ICD-10-CM

## 2021-11-12 DIAGNOSIS — D68.61 ANTIPHOSPHOLIPID ANTIBODY WITH HYPERCOAGULABLE STATE (HCC): ICD-10-CM

## 2021-11-12 RX ORDER — WARFARIN SODIUM 1 MG/1
TABLET ORAL
Qty: 30 TABLET | Refills: 0 | Status: SHIPPED | OUTPATIENT
Start: 2021-11-12 | End: 2021-12-07

## 2021-11-15 ENCOUNTER — TELEPHONE (OUTPATIENT)
Dept: CARDIOLOGY CLINIC | Facility: CLINIC | Age: 61
End: 2021-11-15

## 2021-11-17 DIAGNOSIS — I63.9 CEREBROVASCULAR ACCIDENT (CVA), UNSPECIFIED MECHANISM (HCC): ICD-10-CM

## 2021-11-27 DIAGNOSIS — K21.9 GASTROESOPHAGEAL REFLUX DISEASE WITHOUT ESOPHAGITIS: ICD-10-CM

## 2021-11-29 ENCOUNTER — APPOINTMENT (OUTPATIENT)
Dept: LAB | Facility: HOSPITAL | Age: 61
End: 2021-11-29
Payer: COMMERCIAL

## 2021-11-29 ENCOUNTER — ANTICOAG VISIT (OUTPATIENT)
Dept: FAMILY MEDICINE CLINIC | Facility: CLINIC | Age: 61
End: 2021-11-29

## 2021-11-29 RX ORDER — FAMOTIDINE 40 MG/1
TABLET, FILM COATED ORAL
Qty: 90 TABLET | Refills: 1 | Status: SHIPPED | OUTPATIENT
Start: 2021-11-29 | End: 2022-05-24

## 2021-11-30 ENCOUNTER — TELEPHONE (OUTPATIENT)
Dept: SURGERY | Facility: CLINIC | Age: 61
End: 2021-11-30

## 2021-12-02 ENCOUNTER — APPOINTMENT (OUTPATIENT)
Dept: PREADMISSION TESTING | Facility: HOSPITAL | Age: 61
End: 2021-12-02
Payer: COMMERCIAL

## 2021-12-03 ENCOUNTER — ANESTHESIA EVENT (OUTPATIENT)
Dept: PERIOP | Facility: HOSPITAL | Age: 61
End: 2021-12-03
Payer: COMMERCIAL

## 2021-12-07 DIAGNOSIS — I48.0 PAROXYSMAL ATRIAL FIBRILLATION (HCC): ICD-10-CM

## 2021-12-07 DIAGNOSIS — D68.61 ANTIPHOSPHOLIPID ANTIBODY WITH HYPERCOAGULABLE STATE (HCC): ICD-10-CM

## 2021-12-07 DIAGNOSIS — Z79.01 ANTICOAGULATED ON COUMADIN: ICD-10-CM

## 2021-12-07 RX ORDER — WARFARIN SODIUM 1 MG/1
TABLET ORAL
Qty: 30 TABLET | Refills: 0 | Status: SHIPPED | OUTPATIENT
Start: 2021-12-07 | End: 2022-01-03

## 2021-12-08 DIAGNOSIS — I63.9 STROKE (HCC): ICD-10-CM

## 2021-12-08 RX ORDER — WARFARIN SODIUM 5 MG/1
5 TABLET ORAL
Qty: 90 TABLET | Refills: 1 | Status: SHIPPED | OUTPATIENT
Start: 2021-12-08 | End: 2022-06-07

## 2021-12-09 ENCOUNTER — HOSPITAL ENCOUNTER (OUTPATIENT)
Facility: HOSPITAL | Age: 61
Setting detail: OUTPATIENT SURGERY
Discharge: HOME | End: 2021-12-09
Attending: SURGERY | Admitting: SURGERY
Payer: COMMERCIAL

## 2021-12-09 ENCOUNTER — ANESTHESIA (OUTPATIENT)
Dept: PERIOP | Facility: HOSPITAL | Age: 61
End: 2021-12-09
Payer: COMMERCIAL

## 2021-12-09 VITALS
WEIGHT: 258.16 LBS | BODY MASS INDEX: 39.13 KG/M2 | HEIGHT: 68 IN | OXYGEN SATURATION: 92 % | DIASTOLIC BLOOD PRESSURE: 71 MMHG | RESPIRATION RATE: 16 BRPM | TEMPERATURE: 97.6 F | HEART RATE: 76 BPM | SYSTOLIC BLOOD PRESSURE: 132 MMHG

## 2021-12-09 DIAGNOSIS — K40.20 NON-RECURRENT BILATERAL INGUINAL HERNIA WITHOUT OBSTRUCTION OR GANGRENE: Primary | ICD-10-CM

## 2021-12-09 DIAGNOSIS — K40.20 BILATERAL INGUINAL HERNIA WITHOUT OBSTRUCTION OR GANGRENE, RECURRENCE NOT SPECIFIED: ICD-10-CM

## 2021-12-09 PROCEDURE — 88302 TISSUE EXAM BY PATHOLOGIST: CPT | Performed by: PATHOLOGY

## 2021-12-09 PROCEDURE — C1781 MESH (IMPLANTABLE): HCPCS | Performed by: SURGERY

## 2021-12-09 PROCEDURE — 49650 LAP ING HERNIA REPAIR INIT: CPT | Performed by: SURGERY

## 2021-12-09 PROCEDURE — S2900 ROBOTIC SURGICAL SYSTEM: HCPCS | Performed by: SURGERY

## 2021-12-09 PROCEDURE — NC001 PR NO CHARGE: Performed by: SURGERY

## 2021-12-09 DEVICE — MESH LAP BILATERAL ANTM PROGRIP: Type: IMPLANTABLE DEVICE | Status: FUNCTIONAL

## 2021-12-09 RX ORDER — MEPERIDINE HYDROCHLORIDE 25 MG/ML
12.5 INJECTION INTRAMUSCULAR; INTRAVENOUS; SUBCUTANEOUS
Status: DISCONTINUED | OUTPATIENT
Start: 2021-12-09 | End: 2021-12-09 | Stop reason: HOSPADM

## 2021-12-09 RX ORDER — OXYCODONE HYDROCHLORIDE 5 MG/1
5 TABLET ORAL EVERY 4 HOURS PRN
Qty: 20 TABLET | Refills: 0 | Status: SHIPPED | OUTPATIENT
Start: 2021-12-09 | End: 2021-12-19

## 2021-12-09 RX ORDER — ONDANSETRON 2 MG/ML
INJECTION INTRAMUSCULAR; INTRAVENOUS AS NEEDED
Status: DISCONTINUED | OUTPATIENT
Start: 2021-12-09 | End: 2021-12-09

## 2021-12-09 RX ORDER — ROCURONIUM BROMIDE 10 MG/ML
INJECTION, SOLUTION INTRAVENOUS AS NEEDED
Status: DISCONTINUED | OUTPATIENT
Start: 2021-12-09 | End: 2021-12-09

## 2021-12-09 RX ORDER — LIDOCAINE HYDROCHLORIDE 10 MG/ML
INJECTION, SOLUTION EPIDURAL; INFILTRATION; INTRACAUDAL; PERINEURAL AS NEEDED
Status: DISCONTINUED | OUTPATIENT
Start: 2021-12-09 | End: 2021-12-09

## 2021-12-09 RX ORDER — ONDANSETRON 2 MG/ML
4 INJECTION INTRAMUSCULAR; INTRAVENOUS ONCE AS NEEDED
Status: DISCONTINUED | OUTPATIENT
Start: 2021-12-09 | End: 2021-12-09 | Stop reason: HOSPADM

## 2021-12-09 RX ORDER — SODIUM CHLORIDE, SODIUM LACTATE, POTASSIUM CHLORIDE, CALCIUM CHLORIDE 600; 310; 30; 20 MG/100ML; MG/100ML; MG/100ML; MG/100ML
125 INJECTION, SOLUTION INTRAVENOUS CONTINUOUS
Status: DISCONTINUED | OUTPATIENT
Start: 2021-12-09 | End: 2021-12-09 | Stop reason: HOSPADM

## 2021-12-09 RX ORDER — DEXAMETHASONE SODIUM PHOSPHATE 10 MG/ML
INJECTION, SOLUTION INTRAMUSCULAR; INTRAVENOUS AS NEEDED
Status: DISCONTINUED | OUTPATIENT
Start: 2021-12-09 | End: 2021-12-09

## 2021-12-09 RX ORDER — MORPHINE SULFATE 10 MG/ML
2 INJECTION, SOLUTION INTRAMUSCULAR; INTRAVENOUS
Status: DISCONTINUED | OUTPATIENT
Start: 2021-12-09 | End: 2021-12-09 | Stop reason: HOSPADM

## 2021-12-09 RX ORDER — EPHEDRINE SULFATE 50 MG/ML
INJECTION INTRAVENOUS AS NEEDED
Status: DISCONTINUED | OUTPATIENT
Start: 2021-12-09 | End: 2021-12-09

## 2021-12-09 RX ORDER — HYDROMORPHONE HCL/PF 1 MG/ML
0.5 SYRINGE (ML) INJECTION
Status: DISCONTINUED | OUTPATIENT
Start: 2021-12-09 | End: 2021-12-09 | Stop reason: HOSPADM

## 2021-12-09 RX ORDER — OXYCODONE HYDROCHLORIDE AND ACETAMINOPHEN 5; 325 MG/1; MG/1
1 TABLET ORAL EVERY 4 HOURS PRN
Status: DISCONTINUED | OUTPATIENT
Start: 2021-12-09 | End: 2021-12-09 | Stop reason: HOSPADM

## 2021-12-09 RX ORDER — CEFAZOLIN SODIUM 2 G/50ML
2000 SOLUTION INTRAVENOUS ONCE
Status: COMPLETED | OUTPATIENT
Start: 2021-12-09 | End: 2021-12-09

## 2021-12-09 RX ORDER — PROPOFOL 10 MG/ML
INJECTION, EMULSION INTRAVENOUS AS NEEDED
Status: DISCONTINUED | OUTPATIENT
Start: 2021-12-09 | End: 2021-12-09

## 2021-12-09 RX ORDER — BUPIVACAINE HYDROCHLORIDE AND EPINEPHRINE 2.5; 5 MG/ML; UG/ML
INJECTION, SOLUTION EPIDURAL; INFILTRATION; INTRACAUDAL; PERINEURAL AS NEEDED
Status: DISCONTINUED | OUTPATIENT
Start: 2021-12-09 | End: 2021-12-09 | Stop reason: HOSPADM

## 2021-12-09 RX ORDER — FENTANYL CITRATE 50 UG/ML
INJECTION, SOLUTION INTRAMUSCULAR; INTRAVENOUS AS NEEDED
Status: DISCONTINUED | OUTPATIENT
Start: 2021-12-09 | End: 2021-12-09

## 2021-12-09 RX ORDER — OXYCODONE HYDROCHLORIDE AND ACETAMINOPHEN 5; 325 MG/1; MG/1
2 TABLET ORAL EVERY 6 HOURS PRN
Status: DISCONTINUED | OUTPATIENT
Start: 2021-12-09 | End: 2021-12-09 | Stop reason: HOSPADM

## 2021-12-09 RX ORDER — HYDROMORPHONE HCL/PF 1 MG/ML
SYRINGE (ML) INJECTION AS NEEDED
Status: DISCONTINUED | OUTPATIENT
Start: 2021-12-09 | End: 2021-12-09

## 2021-12-09 RX ORDER — MIDAZOLAM HYDROCHLORIDE 2 MG/2ML
INJECTION, SOLUTION INTRAMUSCULAR; INTRAVENOUS AS NEEDED
Status: DISCONTINUED | OUTPATIENT
Start: 2021-12-09 | End: 2021-12-09

## 2021-12-09 RX ORDER — HYDROMORPHONE HCL 110MG/55ML
PATIENT CONTROLLED ANALGESIA SYRINGE INTRAVENOUS AS NEEDED
Status: DISCONTINUED | OUTPATIENT
Start: 2021-12-09 | End: 2021-12-09

## 2021-12-09 RX ORDER — FENTANYL CITRATE/PF 50 MCG/ML
25 SYRINGE (ML) INJECTION
Status: DISCONTINUED | OUTPATIENT
Start: 2021-12-09 | End: 2021-12-09 | Stop reason: HOSPADM

## 2021-12-09 RX ADMIN — HYDROMORPHONE HYDROCHLORIDE 0.5 MG: 1 INJECTION, SOLUTION INTRAMUSCULAR; INTRAVENOUS; SUBCUTANEOUS at 15:25

## 2021-12-09 RX ADMIN — MIDAZOLAM 2 MG: 1 INJECTION INTRAMUSCULAR; INTRAVENOUS at 14:56

## 2021-12-09 RX ADMIN — FENTANYL CITRATE 50 MCG: 50 INJECTION INTRAMUSCULAR; INTRAVENOUS at 15:01

## 2021-12-09 RX ADMIN — SODIUM CHLORIDE, SODIUM LACTATE, POTASSIUM CHLORIDE, AND CALCIUM CHLORIDE 125 ML/HR: .6; .31; .03; .02 INJECTION, SOLUTION INTRAVENOUS at 12:23

## 2021-12-09 RX ADMIN — ROCURONIUM BROMIDE 50 MG: 50 INJECTION, SOLUTION INTRAVENOUS at 15:01

## 2021-12-09 RX ADMIN — FENTANYL CITRATE 25 MCG: 50 INJECTION INTRAMUSCULAR; INTRAVENOUS at 18:06

## 2021-12-09 RX ADMIN — EPHEDRINE SULFATE 10 MG: 50 INJECTION, SOLUTION INTRAVENOUS at 15:13

## 2021-12-09 RX ADMIN — SODIUM CHLORIDE, SODIUM LACTATE, POTASSIUM CHLORIDE, AND CALCIUM CHLORIDE: .6; .31; .03; .02 INJECTION, SOLUTION INTRAVENOUS at 14:51

## 2021-12-09 RX ADMIN — ROCURONIUM BROMIDE 10 MG: 50 INJECTION, SOLUTION INTRAVENOUS at 15:51

## 2021-12-09 RX ADMIN — FENTANYL CITRATE 50 MCG: 50 INJECTION INTRAMUSCULAR; INTRAVENOUS at 14:58

## 2021-12-09 RX ADMIN — CEFAZOLIN SODIUM 2000 MG: 2 SOLUTION INTRAVENOUS at 14:55

## 2021-12-09 RX ADMIN — ROCURONIUM BROMIDE 10 MG: 50 INJECTION, SOLUTION INTRAVENOUS at 16:14

## 2021-12-09 RX ADMIN — HYDROMORPHONE HYDROCHLORIDE 0.5 MG: 1 INJECTION, SOLUTION INTRAMUSCULAR; INTRAVENOUS; SUBCUTANEOUS at 16:12

## 2021-12-09 RX ADMIN — EPHEDRINE SULFATE 10 MG: 50 INJECTION, SOLUTION INTRAVENOUS at 15:16

## 2021-12-09 RX ADMIN — SODIUM CHLORIDE, SODIUM LACTATE, POTASSIUM CHLORIDE, AND CALCIUM CHLORIDE: .6; .31; .03; .02 INJECTION, SOLUTION INTRAVENOUS at 15:29

## 2021-12-09 RX ADMIN — LIDOCAINE HYDROCHLORIDE 100 MG: 10 INJECTION, SOLUTION EPIDURAL; INFILTRATION; INTRACAUDAL; PERINEURAL at 15:01

## 2021-12-09 RX ADMIN — ROCURONIUM BROMIDE 20 MG: 50 INJECTION, SOLUTION INTRAVENOUS at 15:29

## 2021-12-09 RX ADMIN — SUGAMMADEX 400 MG: 100 INJECTION, SOLUTION INTRAVENOUS at 16:46

## 2021-12-09 RX ADMIN — DEXAMETHASONE SODIUM PHOSPHATE 6 MG: 10 INJECTION INTRAMUSCULAR; INTRAVENOUS at 15:28

## 2021-12-09 RX ADMIN — SODIUM CHLORIDE, SODIUM LACTATE, POTASSIUM CHLORIDE, AND CALCIUM CHLORIDE 125 ML/HR: .6; .31; .03; .02 INJECTION, SOLUTION INTRAVENOUS at 17:44

## 2021-12-09 RX ADMIN — PROPOFOL 200 MG: 10 INJECTION, EMULSION INTRAVENOUS at 15:01

## 2021-12-09 RX ADMIN — ONDANSETRON 4 MG: 2 INJECTION INTRAMUSCULAR; INTRAVENOUS at 16:39

## 2021-12-09 RX ADMIN — OXYCODONE HYDROCHLORIDE AND ACETAMINOPHEN 1 TABLET: 5; 325 TABLET ORAL at 19:45

## 2021-12-09 RX ADMIN — FENTANYL CITRATE 25 MCG: 50 INJECTION INTRAMUSCULAR; INTRAVENOUS at 17:46

## 2021-12-10 ENCOUNTER — TELEPHONE (OUTPATIENT)
Dept: SURGERY | Facility: CLINIC | Age: 61
End: 2021-12-10

## 2021-12-16 ENCOUNTER — REMOTE DEVICE CLINIC VISIT (OUTPATIENT)
Dept: CARDIOLOGY CLINIC | Facility: CLINIC | Age: 61
End: 2021-12-16
Payer: COMMERCIAL

## 2021-12-16 DIAGNOSIS — Z95.818 PRESENCE OF OTHER CARDIAC IMPLANTS AND GRAFTS: Primary | ICD-10-CM

## 2021-12-16 PROCEDURE — 93298 REM INTERROG DEV EVAL SCRMS: CPT | Performed by: INTERNAL MEDICINE

## 2021-12-16 PROCEDURE — G2066 INTER DEVC REMOTE 30D: HCPCS | Performed by: INTERNAL MEDICINE

## 2021-12-21 ENCOUNTER — APPOINTMENT (OUTPATIENT)
Dept: LAB | Facility: HOSPITAL | Age: 61
End: 2021-12-21
Payer: COMMERCIAL

## 2021-12-21 ENCOUNTER — ANTICOAG VISIT (OUTPATIENT)
Dept: FAMILY MEDICINE CLINIC | Facility: CLINIC | Age: 61
End: 2021-12-21

## 2021-12-21 LAB — INR PPP: 2.31 (ref 0.84–1.19)

## 2021-12-22 ENCOUNTER — OFFICE VISIT (OUTPATIENT)
Dept: SURGERY | Facility: CLINIC | Age: 61
End: 2021-12-22

## 2021-12-22 VITALS — WEIGHT: 264.2 LBS | TEMPERATURE: 96.7 F | HEIGHT: 68 IN | BODY MASS INDEX: 40.04 KG/M2

## 2021-12-22 DIAGNOSIS — Z98.890 STATUS POST LAPAROSCOPIC HERNIA REPAIR: Primary | ICD-10-CM

## 2021-12-22 DIAGNOSIS — Z87.19 STATUS POST LAPAROSCOPIC HERNIA REPAIR: Primary | ICD-10-CM

## 2021-12-22 PROCEDURE — 99024 POSTOP FOLLOW-UP VISIT: CPT | Performed by: SURGERY

## 2021-12-22 PROCEDURE — 3008F BODY MASS INDEX DOCD: CPT | Performed by: INTERNAL MEDICINE

## 2021-12-31 DIAGNOSIS — D68.61 ANTIPHOSPHOLIPID ANTIBODY WITH HYPERCOAGULABLE STATE (HCC): ICD-10-CM

## 2021-12-31 DIAGNOSIS — I48.0 PAROXYSMAL ATRIAL FIBRILLATION (HCC): ICD-10-CM

## 2021-12-31 DIAGNOSIS — Z79.01 ANTICOAGULATED ON COUMADIN: ICD-10-CM

## 2022-01-01 DIAGNOSIS — E87.6 HYPOKALEMIA: ICD-10-CM

## 2022-01-01 DIAGNOSIS — E87.6 HYPOKALEMIA: Primary | ICD-10-CM

## 2022-01-01 RX ORDER — POTASSIUM CHLORIDE 750 MG/1
TABLET, FILM COATED, EXTENDED RELEASE ORAL
Qty: 30 TABLET | Refills: 5 | Status: SHIPPED | OUTPATIENT
Start: 2022-01-01 | End: 2022-01-01

## 2022-01-01 RX ORDER — POTASSIUM CHLORIDE 20 MEQ/1
20 TABLET, EXTENDED RELEASE ORAL DAILY
Qty: 30 TABLET | Refills: 5 | Status: SHIPPED | OUTPATIENT
Start: 2022-01-01 | End: 2022-01-01

## 2022-01-03 RX ORDER — WARFARIN SODIUM 1 MG/1
TABLET ORAL
Qty: 30 TABLET | Refills: 0 | Status: SHIPPED | OUTPATIENT
Start: 2022-01-03 | End: 2022-02-03

## 2022-01-06 DIAGNOSIS — I10 ESSENTIAL HYPERTENSION: Chronic | ICD-10-CM

## 2022-01-06 RX ORDER — METOPROLOL SUCCINATE 50 MG/1
TABLET, EXTENDED RELEASE ORAL
Qty: 270 TABLET | Refills: 1 | Status: SHIPPED | OUTPATIENT
Start: 2022-01-06 | End: 2022-06-30 | Stop reason: SDUPTHER

## 2022-01-06 RX ORDER — AMLODIPINE BESYLATE 5 MG/1
TABLET ORAL
Qty: 90 TABLET | Refills: 1 | Status: SHIPPED | OUTPATIENT
Start: 2022-01-06 | End: 2022-02-22 | Stop reason: ALTCHOICE

## 2022-01-26 ENCOUNTER — APPOINTMENT (OUTPATIENT)
Dept: LAB | Facility: HOSPITAL | Age: 62
End: 2022-01-26
Payer: COMMERCIAL

## 2022-01-26 ENCOUNTER — ANTICOAG VISIT (OUTPATIENT)
Dept: FAMILY MEDICINE CLINIC | Facility: CLINIC | Age: 62
End: 2022-01-26

## 2022-02-18 ENCOUNTER — TELEPHONE (OUTPATIENT)
Dept: UROLOGY | Facility: MEDICAL CENTER | Age: 62
End: 2022-02-18

## 2022-02-22 ENCOUNTER — OFFICE VISIT (OUTPATIENT)
Dept: FAMILY MEDICINE CLINIC | Facility: CLINIC | Age: 62
End: 2022-02-22
Payer: COMMERCIAL

## 2022-02-22 ENCOUNTER — TELEPHONE (OUTPATIENT)
Dept: NEUROLOGY | Facility: CLINIC | Age: 62
End: 2022-02-22

## 2022-02-22 ENCOUNTER — TELEPHONE (OUTPATIENT)
Dept: FAMILY MEDICINE CLINIC | Facility: CLINIC | Age: 62
End: 2022-02-22

## 2022-02-22 VITALS
SYSTOLIC BLOOD PRESSURE: 142 MMHG | WEIGHT: 259 LBS | DIASTOLIC BLOOD PRESSURE: 78 MMHG | HEIGHT: 68 IN | HEART RATE: 60 BPM | BODY MASS INDEX: 39.25 KG/M2

## 2022-02-22 DIAGNOSIS — E78.2 MIXED HYPERLIPIDEMIA: Chronic | ICD-10-CM

## 2022-02-22 DIAGNOSIS — I10 PRIMARY HYPERTENSION: ICD-10-CM

## 2022-02-22 DIAGNOSIS — R10.31 RIGHT INGUINAL PAIN: ICD-10-CM

## 2022-02-22 DIAGNOSIS — D68.61 ANTIPHOSPHOLIPID ANTIBODY WITH HYPERCOAGULABLE STATE (HCC): ICD-10-CM

## 2022-02-22 DIAGNOSIS — R25.1 TREMOR: ICD-10-CM

## 2022-02-22 DIAGNOSIS — I63.40 CEREBROVASCULAR ACCIDENT (CVA) DUE TO EMBOLISM OF CEREBRAL ARTERY (HCC): ICD-10-CM

## 2022-02-22 DIAGNOSIS — K40.20 NON-RECURRENT BILATERAL INGUINAL HERNIA WITHOUT OBSTRUCTION OR GANGRENE: ICD-10-CM

## 2022-02-22 DIAGNOSIS — G89.29 CHRONIC RIGHT-SIDED LOW BACK PAIN WITHOUT SCIATICA: Primary | ICD-10-CM

## 2022-02-22 DIAGNOSIS — I48.0 PAROXYSMAL ATRIAL FIBRILLATION (HCC): ICD-10-CM

## 2022-02-22 DIAGNOSIS — E66.01 CLASS 2 SEVERE OBESITY DUE TO EXCESS CALORIES WITH SERIOUS COMORBIDITY AND BODY MASS INDEX (BMI) OF 39.0 TO 39.9 IN ADULT (HCC): ICD-10-CM

## 2022-02-22 DIAGNOSIS — Z86.73 HISTORY OF STROKE: ICD-10-CM

## 2022-02-22 DIAGNOSIS — Z79.01 ANTICOAGULATED ON COUMADIN: ICD-10-CM

## 2022-02-22 DIAGNOSIS — R60.0 EDEMA OF BOTH LOWER LEGS: ICD-10-CM

## 2022-02-22 DIAGNOSIS — M54.50 CHRONIC RIGHT-SIDED LOW BACK PAIN WITHOUT SCIATICA: Primary | ICD-10-CM

## 2022-02-22 PROBLEM — K40.90 INGUINAL HERNIA: Status: RESOLVED | Noted: 2021-10-25 | Resolved: 2022-02-22

## 2022-02-22 PROBLEM — I63.9 STROKE (HCC): Status: RESOLVED | Noted: 2020-01-31 | Resolved: 2022-02-22

## 2022-02-22 LAB
SL AMB  POCT GLUCOSE, UA: NEGATIVE
SL AMB LEUKOCYTE ESTERASE,UA: NEGATIVE
SL AMB POCT BILIRUBIN,UA: NEGATIVE
SL AMB POCT BLOOD,UA: NEGATIVE
SL AMB POCT CLARITY,UA: CLEAR
SL AMB POCT COLOR,UA: YELLOW
SL AMB POCT KETONES,UA: NEGATIVE
SL AMB POCT NITRITE,UA: NEGATIVE
SL AMB POCT PH,UA: 7
SL AMB POCT SPECIFIC GRAVITY,UA: 1.01
SL AMB POCT URINE PROTEIN: NEGATIVE
SL AMB POCT UROBILINOGEN: 0.2

## 2022-02-22 PROCEDURE — 81002 URINALYSIS NONAUTO W/O SCOPE: CPT | Performed by: FAMILY MEDICINE

## 2022-02-22 PROCEDURE — 3725F SCREEN DEPRESSION PERFORMED: CPT | Performed by: FAMILY MEDICINE

## 2022-02-22 PROCEDURE — 3077F SYST BP >= 140 MM HG: CPT | Performed by: FAMILY MEDICINE

## 2022-02-22 PROCEDURE — 99214 OFFICE O/P EST MOD 30 MIN: CPT | Performed by: FAMILY MEDICINE

## 2022-02-22 PROCEDURE — 3078F DIAST BP <80 MM HG: CPT | Performed by: FAMILY MEDICINE

## 2022-02-22 PROCEDURE — 3008F BODY MASS INDEX DOCD: CPT | Performed by: FAMILY MEDICINE

## 2022-02-22 PROCEDURE — 1036F TOBACCO NON-USER: CPT | Performed by: FAMILY MEDICINE

## 2022-02-22 RX ORDER — DILTIAZEM HYDROCHLORIDE 180 MG/1
180 CAPSULE, COATED, EXTENDED RELEASE ORAL DAILY
Qty: 30 CAPSULE | Refills: 5 | Status: SHIPPED | OUTPATIENT
Start: 2022-02-22 | End: 2022-03-08 | Stop reason: SDUPTHER

## 2022-02-22 RX ORDER — ASPIRIN 81 MG/1
81 TABLET ORAL DAILY
Qty: 30 TABLET | Refills: 0 | Status: SHIPPED | OUTPATIENT
Start: 2022-02-22 | End: 2022-08-08 | Stop reason: ALTCHOICE

## 2022-02-22 NOTE — ASSESSMENT & PLAN NOTE
Continue with warfarin  If patient needs a in nonsteroidal anti-inflammatory, he would need to try Celebrex  For now, no changes

## 2022-02-22 NOTE — ASSESSMENT & PLAN NOTE
Repair in Dec with Dr Teresa Man  Still with some discomfort in same site as before  Would recommend see Dr Teresa Man again

## 2022-02-22 NOTE — PATIENT INSTRUCTIONS
Problem List Items Addressed This Visit     Anticoagulated on Coumadin     Continue with warfarin  If patient needs a in nonsteroidal anti-inflammatory, he would need to try Celebrex  For now, no changes  Antiphospholip[facility] antibody with hyper[facility]agulable state ([facility] )     On warfarin  No change  Atrial fibrillation ([facility] )     Patient does not have a significant amount of issues with regard AFib  Feels quite stable overall  No changes  Continue on warfarin  Relevant Medications    diltiazem (CARDIZEM CD) 180 mg 24 hr capsule    Edema of both lower legs     Patient continues to have some concerns about edema in the lower extremities  Because of this, will change from amlodipine to Cardizem CD, 180 mg  Recheck blood pressure in approximately 1 month  Also, look to see what his edema is doing  History of stroke     Stable  Continues on warfarin  Follows with Neurology  No change  He of note is on aspirin as well  Hyperlipidemia (Chronic)     Due for labs in March  Follow-up afterwards  Hypertension     Stable  No change  Relevant Medications    diltiazem (CARDIZEM CD) 180 mg 24 hr capsule    Low back pain - Primary    Relevant Orders    XR spine thoracic 3 vw    XR spine lumbar minimum 4 views non injury    Non-recurrent bilateral inguinal hernia without obstruction or gangrene     Repair in Dec with Dr Valentino Robinsons  Still with some discomfort in same site as before  Would recommend see Dr Valentino Robinsons again  Relevant Orders    Ambulatory Referral to General Surgery    Obesity     BMI remains elevated  Increase exercise as tolerable, which is somewhat difficult given his current back pain  This is limiting his walking as well  RESOLVED: Stroke ([facility] )    Relevant Medications    aspirin (ECOTRIN LOW STRENGTH) 81 mg EC tablet    Tremor     Seeing Neuro  Has been having significant tremors  He did not want more meds at this time  Other Visit Diagnoses     Right inguinal pain        Relevant Orders    POCT urine dip (Completed)    Ambulatory Referral to General Surgery          COVID 19 Instructions    Mirella Busby was advised to limit contact with others to essential tasks such as getting food, medications, and medical care  Proper handwashing reviewed, and Hand sanitzer when washing is not available  If the patient develops symptoms of COVID 19, the patient should call the office as soon as possible  For 7835-0377 Flu season, it is strongly recommended that Flu Vaccinations be obtained  Virtual Visits:  Cr: This works on smart phones (any phone with Internet browsing capability)  You should get a text message when the provider is ready to see you  Click on the link in the text message, and the call should start  You will need to type in your name, and allow camera and microphone access  This is HIPPA compliant, and secure  If you have not already done so, get immunized to COVID 19  We are committed to getting you vaccinated as soon as possible and will be closely following CDC and [facility]  guidelines as they are released and revised  Please refer to our COVID-19 vaccine webpage for the most up to date information on the vaccine and our distribution efforts  This site will also have the most up to date recommendations for COVID booster vaccine  [email or web address] tn    Call [phone] ([phone]), option 7    OUR NEW LOCATION:    [address], [address] [place] [address] [address], [place], [address]  Fax: [phone]    Lab services and OB/GYN are at this location as well

## 2022-02-22 NOTE — TELEPHONE ENCOUNTER
----- Message from India Shaffer MD sent at 2/22/2022 12:32 PM EST -----  Clinical team:  Please will you give Lin Castro a call  I messaged CT surgery before to ask if he needed to remain on aspirin from their standpoint in addition to the Coumadin and they replied that he does not  He would not need it from my standpoint for stroke ppx  If he has no history of coronary artery disease/stents or peripheral artery disease I suggest that at this point he can stop the Aspirin and stay on the Coumadin alone  I do recognize that he has not had any real issue on the combination at this point but being on both does increase his risk for bleeding so really we only want to use both when we have to clinically  Venkat, Wanted to let you know as well and keep you in the loop  Thanks! !    ----- Message -----  From: Mami Robin DO  Sent: 8/13/2021   5:51 PM EST  To: India Shaffer MD, Purvi Cornejo MD, #    From cardiac standpoint, he's ok to stop  Thx  RP  ----- Message -----  From: India Shaffer MD  Sent: 8/11/2021   3:10 PM EDT  To: Purvi Cornejo MD, #    Hello Friends,    I saw Lin Castro in the office today  From a neuro standpoint he can do Coumadin monotherapy for stroke ppx and does not need to continue the asa  If he needs it for CV protection or due to his prior aortic repair that is fine  Wanted to see what you thought      Thanks!    -Madeline Franco

## 2022-02-22 NOTE — ASSESSMENT & PLAN NOTE
Patient does not have a significant amount of issues with regard AFib  Feels quite stable overall  No changes  Continue on warfarin

## 2022-02-22 NOTE — ASSESSMENT & PLAN NOTE
BMI remains elevated  Increase exercise as tolerable, which is somewhat difficult given his current back pain  This is limiting his walking as well

## 2022-02-22 NOTE — ASSESSMENT & PLAN NOTE
Patient continues to have some concerns about edema in the lower extremities  Because of this, will change from amlodipine to Cardizem CD, 180 mg  Recheck blood pressure in approximately 1 month  Also, look to see what his edema is doing

## 2022-02-22 NOTE — TELEPHONE ENCOUNTER
Pt called back and is aware of TH instructions to hold his Aspirin and just take his Coumadin, Any other questions or other instructions call pt back

## 2022-02-22 NOTE — PROGRESS NOTES
Assessment and Plan:    Problem List Items Addressed This Visit     Anticoagulated on Coumadin     Continue with warfarin  If patient needs a in nonsteroidal anti-inflammatory, he would need to try Celebrex  For now, no changes  Antiphospholipid antibody with hypercoagulable state ([facility] )     On warfarin  No change  Atrial fibrillation ([facility] )     Patient does not have a significant amount of issues with regard AFib  Feels quite stable overall  No changes  Continue on warfarin  Relevant Medications    diltiazem (CARDIZEM CD) 180 mg 24 hr capsule    Edema of both lower legs     Patient continues to have some concerns about edema in the lower extremities  Because of this, will change from amlodipine to Cardizem CD, 180 mg  Recheck blood pressure in approximately 1 month  Also, look to see what his edema is doing  History of stroke     Stable  Continues on warfarin  Follows with Neurology  No change  He of note is on aspirin as well  Hyperlipidemia (Chronic)     Due for labs in March  Follow-up afterwards  Hypertension     Stable  No change  Relevant Medications    diltiazem (CARDIZEM CD) 180 mg 24 hr capsule    Low back pain - Primary    Relevant Orders    XR spine thoracic 3 vw    XR spine lumbar minimum 4 views non injury    Non-recurrent bilateral inguinal hernia without obstruction or gangrene     Repair in Dec with Dr Ryan Lopez  Still with some discomfort in same site as before  Would recommend see Dr Ryan Lopez again  Relevant Orders    Ambulatory Referral to General Surgery    Obesity     BMI remains elevated  Increase exercise as tolerable, which is somewhat difficult given his current back pain  This is limiting his walking as well  RESOLVED: Stroke ([facility] )    Relevant Medications    aspirin (ECOTRIN LOW STRENGTH) 81 mg EC tablet    Tremor     Seeing Neuro  Has been having significant tremors    He did not want more meds at this time  Other Visit Diagnoses     Right inguinal pain        Relevant Orders    POCT urine dip (Completed)    Ambulatory Referral to General Surgery                 Diagnoses and all orders for this visit:    Chronic right-sided low back pain without sciatica  -     XR spine thoracic 3 vw; Future  -     XR spine lumbar minimum 4 views non injury; Future    Non-recurrent bilateral inguinal hernia without obstruction or gangrene  -     Ambulatory Referral to General Surgery; Future    Cerebrovascular accident (CVA) due to embolism of cerebral artery (HCC)  -     aspirin (ECOTRIN LOW STRENGTH) 81 mg EC tablet; Take 1 tablet (81 mg total) by mouth daily    Antiphospholipid antibody with hypercoagulable state ([address] )    Anticoagulated on Coumadin    Paroxysmal atrial fibrillation (HCC)    Edema of both lower legs    Mixed hyperlipidemia    Primary hypertension  -     diltiazem (CARDIZEM CD) 180 mg 24 hr capsule; Take 1 capsule (180 mg total) by mouth daily    History of stroke    Class 2 severe obesity due to excess calories with serious comorbidity and body mass index (BMI) of 39 0 to 39 9 in adult [facility])    Tremor    Right inguinal pain  -     POCT urine dip  -     Ambulatory Referral to General Surgery; Future              Subjective:      Patient ID: Jose Robbins is a 58 y o  male  CC:    Chief Complaint   Patient presents with    Flank Pain     patient c/o right sided flank/groin pain that (possibly) radiates to the right back area since his hernia surgery in December  No fevers, occasional nausea, no vomiting  Patient has not contacted his surgeon  ak       HPI:    Patient is here today because of right flank pain, with some pain in the back as well  He did have surgery recently, with General surgery for hernia repair, but that point they felt that some of his pain was not related directly to the hernia repair      The pain that surgery was referencing was the left leg pains, which he is not having currently  Pain appears to be constant  There is not any time where it gets significantly better  Comes and goes  Worse at night when relaxing  No urinary changes  No gross hematuria  Walking is a problem as well  Seems to be a problem completing his regular walk  Started about 1 month ago  This was about 1 month after hernia repair  Meds: Tylenol  Ankles: Some swelling and some discoloration as noted before  On Coumadin for antiphospholipid  A-Fib: Reports doing well  On Coumadin  Has been walking more with limp  He wondered about post CVA for this  The following portions of the patient's history were reviewed and updated as appropriate: allergies, current medications, past family history, past medical history, past social history, past surgical history and problem list       Review of Systems   Constitutional: Negative  HENT: Negative  Cardiovascular: Positive for leg swelling  Musculoskeletal: Positive for arthralgias, back pain and gait problem  All other systems reviewed and are negative  Data to review:       Objective:    Vitals:    02/22/22 0826   BP: 142/78   Pulse: 60   Weight: 117 kg (259 lb)   Height: 5' 8" (1 727 m)        Physical Exam  Vitals and nursing note reviewed  Constitutional:       Appearance: Normal appearance  Neck:      Vascular: No carotid bruit  Cardiovascular:      Rate and Rhythm: Normal rate and regular rhythm  Pulses: Normal pulses  Carotid pulses are 2+ on the right side and 2+ on the left side  Heart sounds: Normal heart sounds  No murmur heard  No gallop  Pulmonary:      Effort: Pulmonary effort is normal  No respiratory distress  Breath sounds: Normal breath sounds  No stridor  No wheezing, rhonchi or rales  Chest:      Chest wall: No tenderness  Musculoskeletal:      Thoracic back: No tenderness  Lumbar back: Tenderness present          Back:    Neurological:      Mental Status: He is alert

## 2022-02-23 ENCOUNTER — HOSPITAL ENCOUNTER (OUTPATIENT)
Dept: RADIOLOGY | Facility: HOSPITAL | Age: 62
Discharge: HOME | End: 2022-02-23
Payer: COMMERCIAL

## 2022-02-23 DIAGNOSIS — M54.50 CHRONIC RIGHT-SIDED LOW BACK PAIN WITHOUT SCIATICA: ICD-10-CM

## 2022-02-23 DIAGNOSIS — G89.29 CHRONIC RIGHT-SIDED LOW BACK PAIN WITHOUT SCIATICA: ICD-10-CM

## 2022-02-23 PROCEDURE — 72110 X-RAY EXAM L-2 SPINE 4/>VWS: CPT

## 2022-02-23 PROCEDURE — 72072 X-RAY EXAM THORAC SPINE 3VWS: CPT

## 2022-02-24 NOTE — TELEPHONE ENCOUNTER
Reached vm, left message to call back regarding update on medication recommendation from Dr Reyes Buhl

## 2022-02-28 ENCOUNTER — RA CDI HCC (OUTPATIENT)
Dept: OTHER | Facility: HOSPITAL | Age: 62
End: 2022-02-28

## 2022-02-28 NOTE — PROGRESS NOTES
[facility]  coding opportunities       Chart reviewed, no opportunity found: CHART REVIEWED, NO OPPORTUNITY FOUND                        Patients insurance company: [employer] (Medicare Advantage and Commercial)

## 2022-03-04 ENCOUNTER — ANTICOAG VISIT (OUTPATIENT)
Dept: FAMILY MEDICINE CLINIC | Facility: CLINIC | Age: 62
End: 2022-03-04

## 2022-03-04 ENCOUNTER — APPOINTMENT (OUTPATIENT)
Dept: LAB | Facility: HOSPITAL | Age: 62
End: 2022-03-04
Payer: COMMERCIAL

## 2022-03-04 DIAGNOSIS — E55.9 VITAMIN D DEFICIENCY: ICD-10-CM

## 2022-03-04 DIAGNOSIS — I10 ESSENTIAL HYPERTENSION: ICD-10-CM

## 2022-03-04 DIAGNOSIS — E78.2 MIXED HYPERLIPIDEMIA: Chronic | ICD-10-CM

## 2022-03-04 LAB
25(OH)D3 SERPL-MCNC: 34.6 NG/ML (ref 30–100)
ALBUMIN SERPL BCP-MCNC: 3.8 G/DL (ref 3.5–5)
ALP SERPL-CCNC: 174 U/L (ref 46–116)
ALT SERPL W P-5'-P-CCNC: 35 U/L (ref 12–78)
ANION GAP SERPL CALCULATED.3IONS-SCNC: 7 MMOL/L (ref 4–13)
AST SERPL W P-5'-P-CCNC: 19 U/L (ref 5–45)
BILIRUB SERPL-MCNC: 1.23 MG/DL (ref 0.2–1)
BUN SERPL-MCNC: 10 MG/DL (ref 5–25)
CALCIUM SERPL-MCNC: 9.1 MG/DL (ref 8.3–10.1)
CHLORIDE SERPL-SCNC: 102 MMOL/L (ref 100–108)
CHOLEST SERPL-MCNC: 118 MG/DL
CO2 SERPL-SCNC: 32 MMOL/L (ref 21–32)
CREAT SERPL-MCNC: 1.12 MG/DL (ref 0.6–1.3)
GFR SERPL CREATININE-BSD FRML MDRD: 70 ML/MIN/1.73SQ M
GLUCOSE P FAST SERPL-MCNC: 107 MG/DL (ref 65–99)
HDLC SERPL-MCNC: 34 MG/DL
LDLC SERPL DIRECT ASSAY-MCNC: 71 MG/DL (ref 0–100)
POTASSIUM SERPL-SCNC: 3.7 MMOL/L (ref 3.5–5.3)
PROT SERPL-MCNC: 7.5 G/DL (ref 6.4–8.2)
SODIUM SERPL-SCNC: 141 MMOL/L (ref 136–145)

## 2022-03-04 PROCEDURE — 80053 COMPREHEN METABOLIC PANEL: CPT

## 2022-03-04 PROCEDURE — 82306 VITAMIN D 25 HYDROXY: CPT

## 2022-03-04 PROCEDURE — 82465 ASSAY BLD/SERUM CHOLESTEROL: CPT

## 2022-03-04 PROCEDURE — 83718 ASSAY OF LIPOPROTEIN: CPT

## 2022-03-04 PROCEDURE — 83721 ASSAY OF BLOOD LIPOPROTEIN: CPT

## 2022-03-08 ENCOUNTER — OFFICE VISIT (OUTPATIENT)
Dept: FAMILY MEDICINE CLINIC | Facility: CLINIC | Age: 62
End: 2022-03-08
Payer: COMMERCIAL

## 2022-03-08 VITALS
RESPIRATION RATE: 16 BRPM | DIASTOLIC BLOOD PRESSURE: 84 MMHG | SYSTOLIC BLOOD PRESSURE: 142 MMHG | HEIGHT: 68 IN | BODY MASS INDEX: 39.62 KG/M2 | WEIGHT: 261.4 LBS | HEART RATE: 64 BPM

## 2022-03-08 DIAGNOSIS — Z86.73 HISTORY OF STROKE: ICD-10-CM

## 2022-03-08 DIAGNOSIS — I10 PRIMARY HYPERTENSION: ICD-10-CM

## 2022-03-08 DIAGNOSIS — K21.9 GASTROESOPHAGEAL REFLUX DISEASE WITHOUT ESOPHAGITIS: ICD-10-CM

## 2022-03-08 DIAGNOSIS — D68.61 ANTIPHOSPHOLIPID ANTIBODY WITH HYPERCOAGULABLE STATE (HCC): ICD-10-CM

## 2022-03-08 DIAGNOSIS — E80.6 HYPERBILIRUBINEMIA: ICD-10-CM

## 2022-03-08 DIAGNOSIS — E78.2 MIXED HYPERLIPIDEMIA: Chronic | ICD-10-CM

## 2022-03-08 DIAGNOSIS — R74.8 ALKALINE PHOSPHATASE ELEVATION: ICD-10-CM

## 2022-03-08 DIAGNOSIS — F41.1 GENERALIZED ANXIETY DISORDER: ICD-10-CM

## 2022-03-08 DIAGNOSIS — Z79.01 ANTICOAGULATED ON COUMADIN: ICD-10-CM

## 2022-03-08 DIAGNOSIS — E55.9 VITAMIN D DEFICIENCY: ICD-10-CM

## 2022-03-08 DIAGNOSIS — I48.0 PAROXYSMAL ATRIAL FIBRILLATION (HCC): Primary | ICD-10-CM

## 2022-03-08 PROCEDURE — 3725F SCREEN DEPRESSION PERFORMED: CPT | Performed by: FAMILY MEDICINE

## 2022-03-08 PROCEDURE — 99214 OFFICE O/P EST MOD 30 MIN: CPT | Performed by: FAMILY MEDICINE

## 2022-03-08 PROCEDURE — G0439 PPPS, SUBSEQ VISIT: HCPCS | Performed by: FAMILY MEDICINE

## 2022-03-08 RX ORDER — DILTIAZEM HYDROCHLORIDE 240 MG/1
240 CAPSULE, COATED, EXTENDED RELEASE ORAL DAILY
Qty: 30 CAPSULE | Refills: 5 | Status: SHIPPED | OUTPATIENT
Start: 2022-03-08 | End: 2022-07-31

## 2022-03-08 NOTE — ASSESSMENT & PLAN NOTE
Patient received custom molded orthotics on 11/6/2019. Mom reports that these have worn out and Devi needs a new pair. [facility] at [phone]. Stable  Doing well  No concerns

## 2022-03-08 NOTE — PATIENT INSTRUCTIONS
Problem List Items Addressed This Visit     Alkaline phosphatase elevation     Present for a while now  Has had us and CT in past, both normal with respect to liver  Anticoagulated on Coumadin     Patient remains on Coumadin  No change  Antiphospholipid antibody with hypercoagulable state ([facility] )     On coumadin  No changes  Anxiety disorder     Increased anxiety with increased stress and change in social situation  Consider counseling  Relevant Orders    Ambulatory Referral to Behavioral Health    Atrial fibrillation ([facility] ) - Primary     Stable with treatment for HR and anticoag  No symptoms  Relevant Medications    diltiazem (CARDIZEM CD) 240 mg 24 hr capsule    GERD (gastroesophageal reflux disease)     Stable  Doing well  No concerns  History of stroke     Doing well  Minimal problems  Hyperbilirubinemia     Stable  No concerns  Bili stable  Has been OK with US and CT before  Hyperlipidemia (Chronic)     Good  Check in 6 months  Relevant Orders    Comprehensive metabolic panel    Lipid panel    Hypertension     Stable  Systolic a bit elevated  Edema is better off norvasc  Increase Cardizem to 240mg  Relevant Medications    diltiazem (CARDIZEM CD) 240 mg 24 hr capsule    Other Relevant Orders    Comprehensive metabolic panel    Vitamin D deficiency     Vit D was good  Relevant Orders    Vitamin D 25 hydroxy          COVID 19 Instructions    Claudia Winters was advised to limit contact with others to essential tasks such as getting food, medications, and medical care  Proper handwashing reviewed, and Hand sanitzer when washing is not available  If the patient develops symptoms of COVID 19, the patient should call the office as soon as possible  For [id number] Flu season, it is strongly recommended that Flu Vaccinations be obtained        Virtual Visits:  Cr: This works on smart phones (any phone with Internet browsing capability)  You should get a text message when the provider is ready to see you  Click on the link in the text message, and the call should start  You will need to type in your name, and allow camera and microphone access  This is HIPPA compliant, and secure  If you have not already done so, get immunized to COVID 19  We are committed to getting you vaccinated as soon as possible and will be closely following CDC and [facility]  guidelines as they are released and revised  Please refer to our COVID-19 vaccine webpage for the most up to date information on the vaccine and our distribution efforts  This site will also have the most up to date recommendations for COVID booster vaccine  [facility]    Call [phone] ([phone]), option 7    OUR NEW LOCATION:    [address], [address] [place], [place] [address] [place], [place], [address]  Fax: [phone]    Lab services and OB/GYN are at this location as well

## 2022-03-08 NOTE — PROGRESS NOTES
Assessment and Plan:     Problem List Items Addressed This Visit     Alkaline phosphatase elevation     Present for a while now  Has had us and CT in past, both normal with respect to liver  Anticoagulated on Coumadin     Patient remains on Coumadin  No change  Antiphospholipid antibody with hypercoagulable state ([facility] )     On coumadin  No changes  Anxiety disorder     Increased anxiety with increased stress and change in social situation  Consider counseling  Relevant Orders    Ambulatory Referral to Behavioral Health    Atrial fibrillation ([facility] ) - Primary     Stable with treatment for HR and anticoag  No symptoms  Relevant Medications    diltiazem (CARDIZEM CD) 240 mg 24 hr capsule    GERD (gastroesophageal reflux disease)     Stable  Doing well  No concerns  History of stroke     Doing well  Minimal problems  Hyperbilirubinemia     Stable  No concerns  Bili stable  Has been OK with US and CT before  Hyperlipidemia (Chronic)     Good  Check in 6 months  Relevant Orders    Comprehensive metabolic panel    Lipid panel    Hypertension     Stable  Systolic a bit elevated  Edema is better off norvasc  Increase Cardizem to 240mg  Relevant Medications    diltiazem (CARDIZEM CD) 240 mg 24 hr capsule    Other Relevant Orders    Comprehensive metabolic panel    Vitamin D deficiency     Vit D was good  Relevant Orders    Vitamin D 25 hydroxy           Preventive health issues were discussed with patient, and age appropriate screening tests were ordered as noted in patient's After Visit Summary  Personalized health advice and appropriate referrals for health education or preventive services given if needed, as noted in patient's After Visit Summary       History of Present Illness:     Patient presents for Medicare Annual Wellness visit    Patient Care Team:  Todd Weber MD as PCP - General Bobo Castañeda Shorty Paez MD  [facility] Eric, MD Sarah Schumacher PA-C Christiane Seaman, MD (Ophthalmology)     Problem List:     Patient Active Problem List   Diagnosis    Numbness    H/O aortic aneurysm repair    Hypertension    GERD (gastroesophageal reflux disease)    Anxiety disorder    Atrial fibrillation ([place] [address] )    Constipation    Irritable bowel syndrome    Hyperlipidemia    Kidney stones    Panic attack    Peyronie disease    Vitamin D deficiency    Obesity    Other viral warts    Physical deconditioning    History of stroke    Antiphospholipid antibody with hypercoagulable state ([place] [address] )    Hyperbilirubinemia    CORIE (obstructive sleep apnea)    Anticoagulated on Coumadin    Tremor    Weakness of both lower extremities    Gastric polyps    Gastric AVM    Colon polyp    Edema of both lower legs    Non-recurrent bilateral inguinal hernia without obstruction or gangrene    Status post laparoscopic hernia repair    Low back pain    Alkaline phosphatase elevation      Past Medical and Surgical History:     Past Medical History:   Diagnosis Date    Aneurysm of thoracic aorta ([place] [address] )     last assessed 11/20/17    Anxiety     currently on no meds    Arthritis     Bilateral inguinal hernia     Cardiac disease     Depression     GERD (gastroesophageal reflux disease)     Hearing loss of aging     Hyperlipidemia     Hypertension     Irritable bowel syndrome     Kidney stone     Occasional tremors     left arm since stroke    Palpitations     PONV (postoperative nausea and vomiting)     and headache x 3 days    Sciatica     right and left  side    Shortness of breath     on exertion    Sleep apnea     is not using a CPAP    Spitting up blood 5/21/2021    Resolved    Status post placement of implantable loop recorder     Stroke ([place] [address] ) 11/2014    Stroke ([place] [address] ) 03/2021    TIA (transient ischemic attack)      Past Surgical History:   Procedure Laterality Date    AORTA SURGERY      thoracic - aneurysmorrhaphy    APPENDECTOMY      ASCENDING AORTIC ANEURYSM REPAIR      resolved 8/19/15    CARDIAC LOOP RECORDER      CHOLECYSTECTOMY      laparoscopic    COLONOSCOPY      CYSTOSCOPY      with removal of object- stent removal    KNEE ARTHROSCOPY Right 1996    with medial meniscus repair    LITHOTRIPSY      renal    SD FRAGMENT KIDNEY STONE/ ESWL Left 5/17/2018    Procedure: LITHROTRIPSY EXTRACORPORAL SHOCKWAVE (ESWL);   Surgeon: Jose De Jesus Lares MD;  [facility];  Service: Urology    SD Ameena Raymundo 19 Bilateral 12/9/2021    Procedure: ROBOTIC REPAIR HERNIA INGUINAL;  Surgeon: Rayna Saunders MD;  Location: [facility];  Service: General    THUMB ARTHROSCOPY Right 1976    ligament repair    UPPER GASTROINTESTINAL ENDOSCOPY        Family History:     Family History   Problem Relation Age of Onset    Diverticulitis Mother         of colon    Nephrolithiasis Mother     Emphysema Father     Nephrolithiasis Sister     Heart attack Maternal Grandfather 72    Breast cancer Paternal Grandmother     Lung cancer Paternal Grandfather     Cancer Family     Coronary artery disease Family     Diabetes Family         sibling    Hypertension Family         sibling    Hernia Family         sibling      Social History:     Social History     Socioeconomic History    Marital status:      Spouse name: None    Number of children: None    Years of education: None    Highest education level: None   Occupational History    Occupation: disabled     Tobacco Use    Smoking status: Never Smoker    Smokeless tobacco: Never Used    Tobacco comment: no second hand smoke exposure   Vaping Use    Vaping Use: Never used   Substance and Sexual Activity    Alcohol use: Not Currently    Drug use: No    Sexual activity: None   Other Topics Concern    None   Social History Narrative    Caffeine use    Completed some college  as per [facility]     Social Determinants of Health     Financial Resource Strain: Not on file   Food Insecurity: Not on file   Transportation Needs: Not on file   Physical Activity: Not on file   Stress: Not on file   Social Connections: Not on file   Intimate Partner Violence: Not on file   Housing Stability: Not on file      Medications and Allergies:     Current Outpatient Medications   Medication Sig Dispense Refill    amoxicillin (AMOXIL) 500 mg capsule       aspirin (ECOTRIN LOW STRENGTH) 81 mg EC tablet Take 1 tablet (81 mg total) by mouth daily 30 tablet 0    atorvastatin (LIPITOR) 40 mg tablet TAKE 1 TABLET BY MOUTH DAILY WITH DINNER 90 tablet 1    cholecalciferol (VITAMIN D3) 1,000 units tablet Take 1 tablet (1,000 Units total) by mouth daily 30 tablet 5    diltiazem (CARDIZEM CD) 240 mg 24 hr capsule Take 1 capsule (240 mg total) by mouth daily 30 capsule 5    famotidine (PEPCID) 40 MG tablet TAKE 1 TABLET BY MOUTH EVERY DAY 90 tablet 1    metoprolol succinate (TOPROL-XL) 50 mg 24 hr tablet TAKE 3 TABLETS BY MOUTH EVERY  tablet 1    pantoprazole (PROTONIX) 40 mg tablet TAKE 1 TABLET BY MOUTH EVERY DAY 90 tablet 1    warfarin (COUMADIN) 1 mg tablet TAKE 1 TABLET DAILY WITH 5MG TABS OR AS DIRECTED 30 tablet 5    warfarin (COUMADIN) 5 mg tablet Take 1 tablet (5 mg total) by mouth daily 90 tablet 1     No current facility-administered medications for this visit  Allergies   Allergen Reactions    Losartan Angioedema    Bactrim [Sulfamethoxazole-Trimethoprim]     Eliquis [Apixaban] Other (See Comments)     Failed  Had embolic CVA    Lisinopril      Felt bad, was OK with Zestril brand name      Tramadol       Immunizations:     Immunization History   Administered Date(s) Administered    COVID-19 PFIZER VACCINE 0 3 ML IM 03/29/2021, 04/19/2021, 11/17/2021    INFLUENZA 09/24/2019, 10/08/2021    Influenza Injectable, MDCK, Preservative Free, Quadrivalent, 0 5 mL 11/06/2018, 09/24/2019, 09/18/2020    Influenza Quadrivalent Preservative Free 3 years and older IM 11/14/2017    Td (adult), Unspecified 08/04/2009    Tdap 11/10/2017      Health Maintenance:         Topic Date Due    Hepatitis C Screening  Never done    HIV Screening  Never done    Colorectal Cancer Screening  11/12/2025     There are no preventive care reminders to display for this patient  Medicare Health Risk Assessment:     /84 (BP Location: Left arm, Patient Position: Sitting, Cuff Size: Large)   Pulse 64   Resp 16   Ht 5' 8" (1 727 m)   Wt 119 kg (261 lb 6 4 oz)   BMI 39 75 kg/m²      Omega Dumont is here for his Subsequent Wellness visit  Health Risk Assessment:   Patient rates overall health as good  Patient feels that their physical health rating is slightly worse  Patient is satisfied with their life  Eyesight was rated as same  Hearing was rated as same  Patient feels that their emotional and mental health rating is same  Patients states they are sometimes angry  Patient states they are never, rarely unusually tired/fatigued  Pain experienced in the last 7 days has been none  Patient states that he has experienced no weight loss or gain in last 6 months  Depression Screening:   PHQ-2 Score: 2      Fall Risk Screening: In the past year, patient has experienced: no history of falling in past year      Home Safety:  Patient has trouble with stairs inside or outside of their home  Patient has working smoke alarms and has working carbon monoxide detector  Home safety hazards include: none  Nutrition:   Current diet is Regular  Medications:   Patient is currently taking over-the-counter supplements  OTC medications include: see medication list  Patient is able to manage medications       Activities of Daily Living (ADLs)/Instrumental Activities of Daily Living (IADLs):   Walk and transfer into and out of bed and chair?: Yes  Dress and groom yourself?: Yes    Bathe or shower yourself?: Yes Feed yourself? Yes  Do your laundry/housekeeping?: Yes  Manage your money, pay your bills and track your expenses?: Yes  Make your own meals?: Yes    Do your own shopping?: Yes    Previous Hospitalizations:   Any hospitalizations or ED visits within the last 12 months?: Yes    How many hospitalizations have you had in the last year?: 1-2    Advance Care Planning:   Living will: No    Durable POA for healthcare: No    Advanced directive: No    Advanced directive counseling given: Yes    Five wishes given: Yes      Cognitive Screening:   Provider or family/friend/caregiver concerned regarding cognition?: No    PREVENTIVE SCREENINGS      Cardiovascular Screening:    General: Screening Not Indicated and History Lipid Disorder      Diabetes Screening:     General: Screening Current      Colorectal Cancer Screening:     General: Screening Current      Prostate Cancer Screening:    General: Screening Current      Osteoporosis Screening:    General: Screening Not Indicated      Abdominal Aortic Aneurysm (AAA) Screening:        General: Screening Not Indicated      Lung Cancer Screening:     General: Screening Not Indicated      Hepatitis C Screening:    General: Screening Not Indicated    Screening, Brief Intervention, and Referral to Treatment (SBIRT)    Screening  Typical number of drinks in a day: 0  Typical number of drinks in a week: 0  Interpretation: Low risk drinking behavior      AUDIT-C Screenin) How often did you have a drink containing alcohol in the past year? never  2) How many drinks did you have on a typical day when you were drinking in the past year? 0  3) How often did you have 6 or more drinks on one occasion in the past year? never    AUDIT-C Score: 0  Interpretation: Score 0-3 (male): Negative screen for alcohol misuse    Single Item Drug Screening:  How often have you used an illegal drug (including marijuana) or a prescription medication for non-medical reasons in the past year? never    Single Item Drug Screen Score: 0  Interpretation: Negative screen for possible drug use disorder      Gisel Coles MD

## 2022-03-08 NOTE — PROGRESS NOTES
Assessment and Plan:    Problem List Items Addressed This Visit     Alkaline phosphatase elevation     Present for a while now  Has had us and CT in past, both normal with respect to liver  Anticoagulated on Coumadin     Patient remains on Coumadin  No change  Antiphospholipid antibody with hypercoagulable [facility] ([facility] )     On coumadin  No changes  Anxiety disorder     Increased anxiety with increased stress and change in social situation  Consider counseling  Relevant Orders    Ambulatory Referral to Behavioral Health    Atrial fibrillation ([place] [id number] ) - Primary     Stable with treatment for HR and anticoag  No symptoms  Relevant Medications    diltiazem (CARDIZEM CD) 240 mg 24 hr capsule    GERD (gastroesophageal reflux disease)     Stable  Doing well  No concerns  History of stroke     Doing well  Minimal problems  Hyperbilirubinemia     Stable  No concerns  Bili stable  Has been OK with US and CT before  Hyperlipidemia (Chronic)     Good  Check in 6 months  Relevant Orders    Comprehensive metabolic panel    Lipid panel    Hypertension     Stable  Systolic a bit elevated  Edema is better off norvasc  Increase Cardizem to 240mg  Relevant Medications    diltiazem (CARDIZEM CD) 240 mg 24 hr capsule    Other Relevant Orders    Comprehensive metabolic panel    Vitamin D deficiency     Vit D was good  Relevant Orders    Vitamin D 25 hydroxy                 Diagnoses and all orders for this visit:    Paroxysmal atrial fibrillation (HCC)    Antiphospholipid antibody with hypercoagulable state ([facility] )    Anticoagulated on Coumadin    Gastroesophageal reflux disease without esophagitis    Primary hypertension  -     diltiazem (CARDIZEM CD) 240 mg 24 hr capsule; Take 1 capsule (240 mg total) by mouth daily  -     Comprehensive metabolic panel;  Future    Mixed hyperlipidemia  -     Comprehensive metabolic panel; Future  -     Lipid panel; Future    Hyperbilirubinemia    History of stroke    Alkaline phosphatase elevation    Generalized anxiety disorder  -     Ambulatory Referral to [facility]; Future    Vitamin D deficiency  -     Vitamin D 25 hydroxy; Future              Subjective:      Patient ID: Mildred Carlson is a 58 y o  male  CC:    Chief Complaint   Patient presents with    Follow-up     pt here for a follow up on testing  R cruz  Medicare Wellness Visit       HPI:    Patient is here to follow-up on several issues  Atrial fibrillation:  Patient really is not having any problems at the moment  Doing quite well as far as that is concerned  CVA:  Old  Secondary to failure of Eliquis  Currently on warfarin  Again, no significant residual at the moment  Alk-phos elevation:  Noted previously  Alk-phos was again elevated today  Hyperlipidemia:  Currently on atorvastatin at 40 mg  Hypertension:  Patient is currently on diltiazem 180 mg, metoprolol succinate 50 mg  GERD:  Currently on pantoprazole and famotidine  Patient reports doing well  If he does miss a dose, he feels that the symptoms started to act up  Valve replacement:  Patient following with Cardiology in the future  Anxiety: Some at night  He is noting some issues with social structure  He would go to the [employer] for socializing, and that is closing  Has no job  The following portions of the patient's history were reviewed and updated as appropriate: allergies, current medications, past family history, past medical history, past social history, past surgical history and problem list       Review of Systems   Constitutional: Negative  HENT: Negative  Eyes: Negative  Respiratory: Negative  Cardiovascular: Negative  Gastrointestinal: Negative  Endocrine: Negative  Genitourinary: Negative  Musculoskeletal: Negative  Skin: Negative  Allergic/Immunologic: Negative  Neurological: Negative  Hematological: Negative  Psychiatric/Behavioral: Negative  Data to review:   Vitamin-D 34 6  Sodium 141, potassium 3 7, calcium 9 1  Blood sugar 107  Creatinine 1 12, GFR:  70  AST 19, ALT 35  Alk-phos 174  Total bilirubin 1 23  Total cholesterol 118, LDL 71, HDL 34  Objective:    Vitals:    03/08/22 0916   BP: 142/84   BP Location: Left arm   Patient Position: Sitting   Cuff Size: Large   Pulse: 64   Resp: 16   Weight: 119 kg (261 lb 6 4 oz)   Height: 5' 8" (1 727 m)        Physical Exam  Vitals and nursing note reviewed  Constitutional:       Appearance: Normal appearance  Neck:      Vascular: No carotid bruit  Cardiovascular:      Rate and Rhythm: Normal rate and regular rhythm  Pulses: Normal pulses  Carotid pulses are 2+ on the right side and 2+ on the left side  Heart sounds: Normal heart sounds  No murmur heard  No gallop  Pulmonary:      Effort: Pulmonary effort is normal  No respiratory distress  Breath sounds: Normal breath sounds  No stridor  No wheezing, rhonchi or rales  Chest:      Chest wall: No tenderness  Neurological:      Mental Status: He is alert  BMI Counseling: Body mass index is 39 75 kg/m²  The BMI is above normal  Nutrition recommendations include decreasing portion sizes, encouraging healthy choices of fruits and vegetables, decreasing fast food intake, consuming healthier snacks, limiting drinks that contain sugar, moderation in carbohydrate intake, increasing intake of lean protein, reducing intake of saturated and trans fat and reducing intake of cholesterol  Exercise recommendations include exercising 3-5 times per week  No pharmacotherapy was ordered  Rationale for BMI follow-up plan is due to patient being overweight or obese

## 2022-03-16 ENCOUNTER — OFFICE VISIT (OUTPATIENT)
Dept: CARDIOLOGY CLINIC | Facility: CLINIC | Age: 62
End: 2022-03-16
Payer: COMMERCIAL

## 2022-03-16 VITALS
WEIGHT: 256.6 LBS | SYSTOLIC BLOOD PRESSURE: 150 MMHG | DIASTOLIC BLOOD PRESSURE: 90 MMHG | BODY MASS INDEX: 39.02 KG/M2 | HEART RATE: 54 BPM

## 2022-03-16 DIAGNOSIS — I35.9 AORTIC VALVE DISEASE: Primary | ICD-10-CM

## 2022-03-16 DIAGNOSIS — I10 BENIGN ESSENTIAL HTN: ICD-10-CM

## 2022-03-16 DIAGNOSIS — R07.89 ATYPICAL CHEST PAIN: ICD-10-CM

## 2022-03-16 PROCEDURE — 99214 OFFICE O/P EST MOD 30 MIN: CPT | Performed by: INTERNAL MEDICINE

## 2022-03-16 NOTE — PROGRESS NOTES
Cardiology             [address] 1960  [id number]                 Assessment/Plan:     1  Benign essential hypertension  2  Bicuspid aortic valve with mild aortic valve stenosis  3  History of significant ascending aortic aneurysm status post replacement with matthew arch construction in 2014 (bicuspid valve not replaced at that time)  4  Paroxysmal atrial fibrillation--noted postOP 2014 without objective recurrence  5  Prior lacunar infarcts on MRI brain 1/2020 with recurrent stroke 03/2020 while on Xarelto, subsequently switched to Eliquis/aspirin, with subsequent CVA on MRI noted 5/2021--switched to warfarin then  6  Hypercoagulable state with antiphospholipid antibody syndrome, positive lupus anticoagulant antibodies  7  Chronic atypical chest pain        · Prior loop recorder interrogation 12/2021 sinus rhythm and PVCs  AFib could not be completely ruled out  Will continue warfarin anticoagulation as followed by PCP   · Blood pressure elevated on today's visit  Advised patient to monitor blood pressures at home and call me in 2 weeks to discuss home blood pressure readings  He is agreeable  Patient educated on appropriate method of checking blood pressure at home    For now will continue diltiazem, metoprolol  · Continue atorvastatin       Follow-up in 4 months          Interval History:      This is a very 61 YO male with a significant history of ascending aortic aneurysm, status post open replacement of the ascending aorta with matthew arch reconstruction on 11/06/2014   Preoperative cardiac catheterization was unremarkable   Postoperatively, he had transient atrial fibrillation, which converted to sinus rhythm on amiodarone/metoprolol, without any recurrence   Therefore he has not been maintained on anticoagulation while seeing Dr Stef Pearce in the past  Ghassan Rutledge also has a history of bicuspid aortic valve without significant stenosis, hypertension      He was last hospitalized August 2017 for numbness on his face, left arm, and left leg, with MRI brain which was unremarkable for stroke, without any evidence of atrial fibrillation on telemetry   MRI cervical spine 09/2017 was unremarkable   Echocardiogram on 8/27/17 revealed a normal ejection fraction at 60% with no evidence of aortic stenosis and no regurgitation   The valve leaflets were not well visualized      Prior Holter monitor October 2017 revealed no evidence of atrial fibrillation, only occasional PACs and PVCs      During his prior visit 01/2019 he had complaints of atypical chest pain   Thereafter he underwent a nuclear stress test 02/2019 which was unremarkable   Echocardiogram revealed mild aortic stenosis      His losartan was discontinued 09/2019 secondary to angioedema   During his appointment with his PCP 12/26/2019, MRI of his head was recommended secondary to ongoing left arm weakness   MRI brain 01/02/2020 reported several subacute lacunar infarctions with microvascular ischemic disease which was stable   Subsequently, he was started on Xarelto by his PCP in light of his prior history of paroxysmal atrial fibrillation which was postoperative  Darien Hannah subsequently was hospitalized 03/2020 with recurrent stroke with CTA and MRI of the brain confirming a new CVA   He underwent loop recorder placement and was changed from Xarelto to Eliquis   Aspirin was added      He has been following with Hematology and has been diagnosed with antiphospholipid syndrome, with blood work 03/2020 confirming presence of lupus anticoagulant antibodies         He was hospitalized 05/01/2021 due to findings of CVA on outpatient MRI that was obtained visual field deficits  He underwent a NGUYỄN which was unremarkable, and was transition from Eliquis to warfarin and aspirin at discharge  His INR care was given to his PCP        He was evaluated by Gastroenterology 06/22/2021 for intermittent dark stools and self limited hematemesis    Subsequent EGD 08/25/2021 revealed evidence of erythematous mucosa with erosion in the stomach with normal esophagus and single small angiectasia without bleeding  He underwent successful hernia surgery 12/09/2021 with successful Lovenox bridging  He presents today for follow-up with no cardiac complaints  He denies chest pain, shortness of breath, dizziness, palpitations, lower extremity edema  He has chronic but stable fatigue  He has not been exercising  Although his blood pressure monitor at home he has not been checking his blood pressures               Vitals:  Vitals:    03/16/22 1519   BP: 150/90   BP Location: Right arm   Patient Position: Sitting   Cuff Size: Large   Pulse: (!) 54   Weight: 116 kg (256 lb 9 6 oz)         Past Medical History:   Diagnosis Date    [facility]     last assessed 11/20/17    Anxiety     currently on no meds    Arthritis     Bilateral inguinal hernia     Cardiac disease     Depression     GERD (gastroesophageal reflux disease)     Hearing loss of aging     Hyperlipidemia     Hypertension     Irritable bowel syndrome     Kidney stone     Occasional tremors     left arm since stroke    Palpitations     PONV (postoperative nausea and vomiting)     and headache x 3 days    Sciatica     right and left  side    Shortness of breath     on exertion    Sleep apnea     is not using a CPAP    Spitting up blood 5/21/2021    Resolved    Status post placement of implantable loop recorder     [facility] ) 11/2014    Stroke ([facility] ) 03/2021    TIA (transient ischemic attack)      Social History     Socioeconomic History    Marital status:      Spouse name: Not on file    Number of children: Not on file    Years of education: Not on file    Highest education level: Not on file   Occupational History    Occupation: disabled     Tobacco Use    Smoking status: Never Smoker    Smokeless tobacco: Never Used    Tobacco comment: no second hand smoke exposure Vaping Use    Vaping Use: Never used   Substance and Sexual Activity    Alcohol use: Not Currently    Drug use: No    Sexual activity: Not on file   Other Topics Concern    Not on file   Social History Narrative    Caffeine use    Completed some college     as per [facility]     Social Determinants of Health     Financial Resource Strain: Not on file   Food Insecurity: Not on file   Transportation Needs: Not on file   Physical Activity: Not on file   Stress: Not on file   Social Connections: Not on file   Intimate Partner Violence: Not on file   Housing Stability: Not on file      Family History   Problem Relation Age of Onset    Diverticulitis Mother         of colon    Nephrolithiasis Mother     Emphysema Father     Nephrolithiasis Sister     Heart attack Maternal Grandfather 72    Breast cancer Paternal Grandmother     Lung cancer Paternal Grandfather     Cancer Family     Coronary artery disease Family     Diabetes Family         sibling    Hypertension Family         sibling    Hernia Family         sibling     Past Surgical History:   Procedure Laterality Date    AORTA SURGERY      thoracic - aneurysmorrhaphy    APPENDECTOMY      ASCENDING AORTIC ANEURYSM REPAIR      resolved 8/19/15    CARDIAC LOOP RECORDER      CHOLECYSTECTOMY      laparoscopic    COLONOSCOPY      CYSTOSCOPY      with removal of object- stent removal    KNEE ARTHROSCOPY Right 1996    with medial meniscus repair    LITHOTRIPSY      renal    KS FRAGMENT KIDNEY STONE/ ESWL Left 5/17/2018    Procedure: LITHROTRIPSY EXTRACORPORAL SHOCKWAVE (ESWL);   Surgeon: Alex Snyder MD;  [facility];  Service: Urology    KS Ameena Zackary 19 Bilateral 12/9/2021    Procedure: ROBOTIC REPAIR HERNIA INGUINAL;  Surgeon: Puma Stewart MD;  Location: [facility];  Service: General    THUMB ARTHROSCOPY Right 1976    ligament repair    UPPER GASTROINTESTINAL ENDOSCOPY         Current Outpatient Medications:     atorvastatin (LIPITOR) 40 mg tablet, TAKE 1 TABLET BY MOUTH DAILY WITH DINNER, Disp: 90 tablet, Rfl: 1    diltiazem (CARDIZEM CD) 240 mg 24 hr capsule, Take 1 capsule (240 mg total) by mouth daily, Disp: 30 capsule, Rfl: 5    famotidine (PEPCID) 40 MG tablet, TAKE 1 TABLET BY MOUTH EVERY DAY, Disp: 90 tablet, Rfl: 1    metoprolol succinate (TOPROL-XL) 50 mg 24 hr tablet, TAKE 3 TABLETS BY MOUTH EVERY DAY, Disp: 270 tablet, Rfl: 1    pantoprazole (PROTONIX) 40 mg tablet, TAKE 1 TABLET BY MOUTH EVERY DAY, Disp: 90 tablet, Rfl: 1    warfarin (COUMADIN) 1 mg tablet, TAKE 1 TABLET DAILY WITH 5MG TABS OR AS DIRECTED, Disp: 30 tablet, Rfl: 5    warfarin (COUMADIN) 5 mg tablet, Take 1 tablet (5 mg total) by mouth daily, Disp: 90 tablet, Rfl: 1    amoxicillin (AMOXIL) 500 mg capsule, , Disp: , Rfl:     aspirin (ECOTRIN LOW STRENGTH) 81 mg EC tablet, Take 1 tablet (81 mg total) by mouth daily (Patient not taking: Reported on 3/16/2022 ), Disp: 30 tablet, Rfl: 0    cholecalciferol (VITAMIN D3) 1,000 units tablet, Take 1 tablet (1,000 Units total) by mouth daily, Disp: 30 tablet, Rfl: 5        Review of Systems:  Review of Systems   Constitutional: Positive for fatigue  Respiratory: Negative  Cardiovascular: Negative  All other systems reviewed and are negative  Physical Exam:  Physical Exam  Constitutional:       General: He is not in acute distress  Appearance: He is well-developed  He is not diaphoretic  HENT:      Head: Normocephalic and atraumatic  Eyes:      General: No scleral icterus  Right eye: No discharge  Pupils: Pupils are equal, round, and reactive to light  Neck:      Thyroid: No thyromegaly  Cardiovascular:      Rate and Rhythm: Normal rate and regular rhythm  Heart sounds: Normal heart sounds  No murmur heard  No friction rub  No gallop  Pulmonary:      Effort: Pulmonary effort is normal       Breath sounds: Normal breath sounds  Abdominal:      General: There is no distension  Tenderness: There is no abdominal tenderness  There is no guarding or rebound  Musculoskeletal:         General: Normal range of motion  Cervical back: Normal range of motion and neck supple  Skin:     General: Skin is warm and dry  Coloration: Skin is not pale  Findings: No erythema or rash  Neurological:      Mental Status: He is alert and oriented to person, place, and time  Coordination: Coordination normal    Psychiatric:         Behavior: Behavior normal          Thought Content: Thought content normal          Judgment: Judgment normal          This note was completed in part utilizing M-Modal Fluency Direct Software  Grammatical errors, random word insertions, spelling mistakes, and incomplete sentences can be an occasional consequence of this system secondary to software limitations, ambient noise, and hardware issues  If you have any questions or concerns about the content, text, or information contained within the body of this dictation, please contact the provider for clarification

## 2022-03-22 ENCOUNTER — OFFICE VISIT (OUTPATIENT)
Dept: SURGERY | Facility: CLINIC | Age: 62
End: 2022-03-22
Payer: COMMERCIAL

## 2022-03-22 VITALS
TEMPERATURE: 96.8 F | RESPIRATION RATE: 16 BRPM | BODY MASS INDEX: 39.71 KG/M2 | HEIGHT: 68 IN | SYSTOLIC BLOOD PRESSURE: 158 MMHG | WEIGHT: 262 LBS | HEART RATE: 52 BPM | DIASTOLIC BLOOD PRESSURE: 91 MMHG

## 2022-03-22 DIAGNOSIS — G89.29 CHRONIC RIGHT-SIDED LOW BACK PAIN WITHOUT SCIATICA: Primary | ICD-10-CM

## 2022-03-22 DIAGNOSIS — R10.31 RIGHT INGUINAL PAIN: ICD-10-CM

## 2022-03-22 DIAGNOSIS — K40.20 NON-RECURRENT BILATERAL INGUINAL HERNIA WITHOUT OBSTRUCTION OR GANGRENE: ICD-10-CM

## 2022-03-22 DIAGNOSIS — E66.01 CLASS 2 SEVERE OBESITY DUE TO EXCESS CALORIES WITH SERIOUS COMORBIDITY AND BODY MASS INDEX (BMI) OF 39.0 TO 39.9 IN ADULT (HCC): ICD-10-CM

## 2022-03-22 DIAGNOSIS — M54.50 CHRONIC RIGHT-SIDED LOW BACK PAIN WITHOUT SCIATICA: Primary | ICD-10-CM

## 2022-03-22 DIAGNOSIS — Z98.890 S/P LAPAROSCOPIC HERNIA REPAIR: ICD-10-CM

## 2022-03-22 DIAGNOSIS — Z87.19 S/P LAPAROSCOPIC HERNIA REPAIR: ICD-10-CM

## 2022-03-22 PROCEDURE — 1036F TOBACCO NON-USER: CPT | Performed by: SURGERY

## 2022-03-22 PROCEDURE — 99213 OFFICE O/P EST LOW 20 MIN: CPT | Performed by: SURGERY

## 2022-03-22 NOTE — PROGRESS NOTES
Assessment/Plan:    Right inguinal pain  He is now 3 months since repair of his henrnias  He had bilateral inguinal repairs  He has no pain on the left  Unfortunately the pains he was having before his surgery have not resolved  He has some degenerative disease on his lumbar xray  I will refer him to spine and pain management for evaluation  I do not believe his pains are related to his surgery  Also discussed weight loss will help with his back pains  F/U PRN       Diagnoses and all orders for this visit:    Chronic right-sided low back pain without sciatica  -     Ambulatory Referral to Pain Management; Future    Right inguinal pain  -     Ambulatory Referral to General Surgery    Non-recurrent bilateral inguinal hernia without obstruction or gangrene  -     Ambulatory Referral to General Surgery  -     Ambulatory Referral to Pain Management; Future    S/P laparoscopic hernia repair  -     Ambulatory Referral to Pain Management; Future    Class 2 severe obesity due to excess calories with serious comorbidity and body mass index (BMI) of 39 0 to 39 9 in [facility])          Subjective:      Patient ID: Gage Cervantes is a 58 y o  male  Mr Bree Correa   Is a 58-year-old gentleman that is referred for evaluation right groin pain  He states he believes he had a longstanding history of possible hernia dating back to in the [de-identified] when he was doing karate he felt a pop in his right groin  More recently he has been having burning and discomfort in the right groin radiating down to the testicle  He does also have some lower back pain and some radiation from there but majority of his symptoms or his focus is right groin  He had an ultrasound that was consistent with a fat containing right inguinal hernia  He denies nausea vomiting fevers or chills  Surgical history for an appendectomy in the past    3/22/22 He returns now for evaluation of right sided back and abdominal and groin pain    He is still having the same pains he had before surgery and also at his post op visit  Denies right leg pains  Denies change in bowel habit  Denies fever or chills  The following portions of the patient's history were reviewed and updated as appropriate: allergies, current medications, past family history, past medical history, past social history, past surgical history and problem list     Review of Systems   Constitutional: Negative for chills and fever  HENT: Negative for ear pain and sore throat  Eyes: Negative for pain and visual disturbance  Respiratory: Negative for cough and shortness of breath  Cardiovascular: Negative for chest pain and palpitations  Gastrointestinal: Positive for abdominal pain  Negative for vomiting  Genitourinary: Negative for dysuria and hematuria  Musculoskeletal: Negative for arthralgias and back pain  Skin: Negative for color change and rash  Neurological: Positive for tremors and weakness  Negative for seizures and syncope  Psychiatric/Behavioral: Negative for agitation and behavioral problems  All other systems reviewed and are negative  Objective:      /91   Pulse (!) 52   Temp (!) 96 8 °F (36 °C)   Resp 16   Ht 5' 8" (1 727 m)   Wt 119 kg (262 lb)   BMI 39 84 kg/m²          Physical Exam  Vitals and nursing note reviewed  Constitutional:       General: He is not in acute distress  Appearance: He is well-developed  He is not diaphoretic  HENT:      Head: Normocephalic and atraumatic  Eyes:      Pupils: Pupils are equal, round, and reactive to light  Cardiovascular:      Rate and Rhythm: Normal rate and regular rhythm  Heart sounds: Normal heart sounds  No murmur heard  Pulmonary:      Effort: Pulmonary effort is normal  No respiratory distress  Breath sounds: Normal breath sounds  No wheezing  Abdominal:      General: Bowel sounds are normal       Palpations: Abdomen is soft  Comments: No evidence of recurrent hernia  Tender RLQ and groin crease   Musculoskeletal:         General: Normal range of motion  Cervical back: Normal range of motion and neck supple  Skin:     General: Skin is warm and dry  Neurological:      Mental Status: He is alert and oriented to person, place, and time     Psychiatric:         Behavior: Behavior normal

## 2022-03-22 NOTE — ASSESSMENT & PLAN NOTE
He is now 3 months since repair of his henrnias  He had bilateral inguinal repairs  He has no pain on the left  Unfortunately the pains he was having before his surgery have not resolved  He has some degenerative disease on his lumbar xray  I will refer him to spine and pain management for evaluation  I do not believe his pains are related to his surgery  Also discussed weight loss will help with his back pains   F/U PRN

## 2022-03-24 ENCOUNTER — TELEPHONE (OUTPATIENT)
Dept: NEUROLOGY | Facility: CLINIC | Age: 62
End: 2022-03-24

## 2022-03-24 NOTE — TELEPHONE ENCOUNTER
Called and spoke with patients sister Aggie Poe  She was not able to reschedule appts, but I did inform her that we would need to reschedule the 4/25 appt with Dr Sharee Nieto and priovided a number to call us back to reschedule   If the patient calls back please reschedule with an AP/NP

## 2022-03-27 DIAGNOSIS — E78.5 HYPERLIPIDEMIA: Chronic | ICD-10-CM

## 2022-03-27 DIAGNOSIS — K21.9 GERD (GASTROESOPHAGEAL REFLUX DISEASE): ICD-10-CM

## 2022-03-28 RX ORDER — PANTOPRAZOLE SODIUM 40 MG/1
TABLET, DELAYED RELEASE ORAL
Qty: 90 TABLET | Refills: 1 | Status: SHIPPED | OUTPATIENT
Start: 2022-03-28

## 2022-03-28 RX ORDER — ATORVASTATIN CALCIUM 40 MG/1
TABLET, FILM COATED ORAL
Qty: 90 TABLET | Refills: 1 | Status: SHIPPED | OUTPATIENT
Start: 2022-03-28 | End: 2022-06-30 | Stop reason: SDUPTHER

## 2022-03-30 ENCOUNTER — OFFICE VISIT (OUTPATIENT)
Dept: NEUROLOGY | Facility: CLINIC | Age: 62
End: 2022-03-30
Payer: COMMERCIAL

## 2022-03-30 VITALS
DIASTOLIC BLOOD PRESSURE: 82 MMHG | HEART RATE: 60 BPM | HEIGHT: 68 IN | WEIGHT: 262.5 LBS | BODY MASS INDEX: 39.78 KG/M2 | SYSTOLIC BLOOD PRESSURE: 140 MMHG | TEMPERATURE: 97.2 F

## 2022-03-30 DIAGNOSIS — M54.50 CHRONIC RIGHT-SIDED LOW BACK PAIN WITHOUT SCIATICA: ICD-10-CM

## 2022-03-30 DIAGNOSIS — Z86.73 HISTORY OF STROKE: ICD-10-CM

## 2022-03-30 DIAGNOSIS — I48.0 PAROXYSMAL ATRIAL FIBRILLATION (HCC): ICD-10-CM

## 2022-03-30 DIAGNOSIS — G89.29 CHRONIC RIGHT-SIDED LOW BACK PAIN WITHOUT SCIATICA: ICD-10-CM

## 2022-03-30 DIAGNOSIS — D68.61 ANTIPHOSPHOLIPID ANTIBODY WITH HYPERCOAGULABLE STATE (HCC): ICD-10-CM

## 2022-03-30 DIAGNOSIS — E78.2 MIXED HYPERLIPIDEMIA: Chronic | ICD-10-CM

## 2022-03-30 DIAGNOSIS — I10 PRIMARY HYPERTENSION: ICD-10-CM

## 2022-03-30 DIAGNOSIS — R20.0 NUMBNESS: ICD-10-CM

## 2022-03-30 DIAGNOSIS — G47.33 OSA (OBSTRUCTIVE SLEEP APNEA): ICD-10-CM

## 2022-03-30 DIAGNOSIS — M54.17 LUMBOSACRAL RADICULOPATHY AT L3: ICD-10-CM

## 2022-03-30 DIAGNOSIS — G62.9 NEUROPATHY: Primary | ICD-10-CM

## 2022-03-30 PROCEDURE — 99215 OFFICE O/P EST HI 40 MIN: CPT | Performed by: PHYSICIAN ASSISTANT

## 2022-03-30 PROCEDURE — 3008F BODY MASS INDEX DOCD: CPT | Performed by: SURGERY

## 2022-03-30 NOTE — PROGRESS NOTES
Patient ID: Sumeet Sagastume is a 58 y o  male       Assessment/Plan:        Problem List Items Addressed This Visit        Respiratory    CORIE (obstructive sleep apnea)     · Not interested in CPAP            Cardiovascular and Mediastinum    Hypertension     · Recent medication change from amlodipine to diltazem  · BP today 140/80         Atrial fibrillation ([facility] )  · On coumadin with INR goal 2-3  · Most recent INR on 3/4/22 was 2 59  · PAF noted postop in 2014 without known reoccurrence  Hematopoietic and Hemostatic    Antiphospholipid antibody with hypercoagulable state ([facility] [address] )       Other    Hyperlipidemia (Chronic)     · Continue atorvastatin   · LDL goal <100         Numbness    History of stroke  · Prior lacunar infarct on MRI 1/2020  · Recurrent stroke 03/2020 while on Verl Marker for which he was switched to Eliquis and aspirin  · Recurrent stroke 05/2021 while on Eliquis       Low back pain      Other Visit Diagnoses     Neuropathy    -  Primary    Relevant Orders    EMG 2 limb lower extremity    Lumbosacral radiculopathy at L3        Relevant Orders    EMG 2 limb lower extremity          History of Present Illness:   Sumeet Sagastume is a 58 y o  left handed male who presents in outpatient neurology clinic for stroke follow up  Mahesh Lisa  was last seen in clinic on 8/11/2021  In regards to his stroke he is on Coumadin with his most recent INR of 2 59 (3/4/22)  He states he gets his INR checked once a month  He is compliant with medications  Last appointment he had some hemoptysis and underwent EGD  His EGD showed a single small nonbleeding gastric AVM on the incisura  He denies any new stroke like symptoms  He did get a loop recorder placed  His last loop interrogation in December, 2021 showed sinus rhythm and PVCs, could not rule out atrial fibrillation completely  His main compliant at today's visit is his ongoing left thigh neuropathy that has been going on since 1995   He states he was sitting in a truck and felt a burning sensation  He thought initially it was due to the sun  Ever since he continued to have this altered sensation  At his last visit he complained of bilateral lower extremity weakness and was evaluated by physical therapy but felt he did not require any services         Past Medical history:     Past Medical History:   Diagnosis Date    Aneurysm of thoracic aorta ([facility] )     last assessed 11/20/17    Anxiety     currently on no meds    Arthritis     Bilateral inguinal hernia     Cardiac disease     Depression     GERD (gastroesophageal reflux disease)     Hearing loss of aging     Hyperlipidemia     Hypertension     Irritable bowel syndrome     Kidney stone     Occasional tremors     left arm since stroke    Palpitations     PONV (postoperative nausea and vomiting)     and headache x 3 days    Sciatica     right and left  side    Shortness of breath     on exertion    Sleep apnea     is not using a CPAP    Spitting up blood 5/21/2021    Resolved    Status post placement of implantable loop recorder     Stroke ([place] [address] ) 11/2014    Stroke ([place] [address] ) 03/2021    TIA (transient ischemic attack)        Patient Active Problem List   Diagnosis    Numbness    H/O aortic aneurysm repair    Hypertension    GERD (gastroesophageal reflux disease)    Anxiety disorder    Atrial fibrillation (HCC)    Constipation    Irritable bowel syndrome    Hyperlipidemia    Kidney stones    Panic attack    Peyronie disease    Vitamin D deficiency    Class 2 obesity in adult    Other viral warts    Physical deconditioning    History of stroke    Antiphospholipid antibody with hypercoagulable state ([place] [address] )    Hyperbilirubinemia    CORIE (obstructive sleep apnea)    Anticoagulated on Coumadin    Tremor    Weakness of both lower extremities    Gastric polyps    Gastric AVM    Colon polyp    Edema of both lower legs    Non-recurrent bilateral inguinal hernia without obstruction or gangrene    S/P laparoscopic hernia repair    Low back pain    Alkaline phosphatase elevation    Right inguinal pain       Medications:      Current Outpatient Medications   Medication Sig Dispense Refill    amoxicillin (AMOXIL) 500 mg capsule       atorvastatin (LIPITOR) 40 mg tablet TAKE 1 TABLET BY MOUTH DAILY WITH DINNER 90 tablet 1    diltiazem (CARDIZEM CD) 240 mg 24 hr capsule Take 1 capsule (240 mg total) by mouth daily 30 capsule 5    famotidine (PEPCID) 40 MG tablet TAKE 1 TABLET BY MOUTH EVERY DAY 90 tablet 1    metoprolol succinate (TOPROL-XL) 50 mg 24 hr tablet TAKE 3 TABLETS BY MOUTH EVERY  tablet 1    pantoprazole (PROTONIX) 40 mg tablet TAKE 1 TABLET BY MOUTH EVERY DAY 90 tablet 1    warfarin (COUMADIN) 1 mg tablet TAKE 1 TABLET DAILY WITH 5MG TABS OR AS DIRECTED 30 tablet 5    warfarin (COUMADIN) 5 mg tablet Take 1 tablet (5 mg total) by mouth daily 90 tablet 1    aspirin (ECOTRIN LOW STRENGTH) 81 mg EC tablet Take 1 tablet (81 mg total) by mouth daily (Patient not taking: Reported on 3/16/2022 ) 30 tablet 0    cholecalciferol (VITAMIN D3) 1,000 units tablet Take 1 tablet (1,000 Units total) by mouth daily 30 tablet 5     No current facility-administered medications for this visit  Allergies: Allergies   Allergen Reactions    Losartan Angioedema    Bactrim [Sulfamethoxazole-Trimethoprim]     Eliquis [Apixaban] Other (See Comments)     Failed  Had embolic CVA    Lisinopril      Felt bad, was OK with Zestril brand name      Tramadol        Family History:     Family History   Problem Relation Age of Onset    Diverticulitis Mother         of colon    Nephrolithiasis Mother     Emphysema Father     Nephrolithiasis Sister     Heart attack Maternal Grandfather 72    Breast cancer Paternal Grandmother     Lung cancer Paternal Grandfather     Cancer Family     Coronary artery disease Family     Diabetes Family         sibling    Hypertension Family sibling    Hernia Family         sibling       Social History:     Social History     Socioeconomic History    Marital status:      Spouse name: Not on file    Number of children: Not on file    Years of education: Not on file    Highest education level: Not on file   Occupational History    Occupation: disabled     Tobacco Use    Smoking status: Never Smoker    Smokeless tobacco: Never Used    Tobacco comment: no second hand smoke exposure   Vaping Use    Vaping Use: Never used   Substance and Sexual Activity    Alcohol use: Not Currently    Drug use: No    Sexual activity: Not on file   Other Topics Concern    Not on file   Social History Narrative    Caffeine use    Completed some college     as per [facility]     Social Determinants of Health     Financial Resource Strain: Not on file   Food Insecurity: Not on file   Transportation Needs: Not on file   Physical Activity: Not on file   Stress: Not on file   Social Connections: Not on file   Intimate Partner Violence: Not on file   Housing Stability: Not on file       The following portions of the patient's history were reviewed and updated as appropriate: allergies, current medications, past family history, past medical history, past social history, past surgical history and problem list    Review of Systems:   Review of Systems  Constitutional: Negative  Negative for appetite change and fever  HENT: Negative  Negative for hearing loss, tinnitus, trouble swallowing and voice change  Eyes: Negative  Negative for photophobia and pain  Respiratory: Positive for shortness of breath  Cardiovascular: Negative  Negative for palpitations  Gastrointestinal: Negative  Negative for nausea and vomiting  Endocrine: Negative  Negative for cold intolerance  Genitourinary: Negative  Negative for dysuria, frequency and urgency  Musculoskeletal: Negative  Negative for myalgias and neck pain  Skin: Negative    Negative for rash    Neurological: Positive for tremors (Left arm) and numbness (Feet)  Negative for dizziness, seizures, syncope, facial asymmetry, speech difficulty, weakness, light-headedness and headaches  Hematological: Bruises/bleeds easily  Psychiatric/Behavioral: Positive for confusion (Sometimes)  Negative for hallucinations and sleep disturbance       ROS reviewed personally and edited as needed  Objective:   Physical Exam:  /82 (BP Location: Left arm, Patient Position: Sitting, Cuff Size: Large)   Pulse 60   Temp (!) 97 2 °F (36 2 °C)   Ht 5' 8" (1 727 m)   Wt 119 kg (262 lb 8 oz)   BMI 39 91 kg/m²     Physical Exam  Eyes:      Extraocular Movements: EOM normal       Pupils: Pupils are equal, round, and reactive to light  Abdominal:      Tenderness: There is no right CVA tenderness or left CVA tenderness  Musculoskeletal:      Comments: No midline thoracolumbar spinal tenderness  No lumbar paraspinal tenderness, noted mild spasms bilaterally  Neurological:      Mental Status: He is oriented to person, place, and time  Coordination: Finger-Nose-Finger Test normal       Gait: Gait is intact  Deep Tendon Reflexes: Strength normal       Reflex Scores:       Bicep reflexes are 1+ on the right side and 1+ on the left side  Brachioradialis reflexes are 1+ on the right side and 1+ on the left side  Patellar reflexes are 1+ on the right side and 1+ on the left side  Achilles reflexes are 1+ on the right side and 1+ on the left side  Psychiatric:         Speech: Speech normal         Neurologic Exam     Mental Status   Oriented to person, place, and time  Follows 2 step commands  Attention: normal  Concentration: normal    Speech: speech is normal   Level of consciousness: alert  Knowledge: good  Normal comprehension  Cranial Nerves     CN III, IV, VI   Pupils are equal, round, and reactive to light  Extraocular motions are normal    Right pupil: Size: 3 mm  Shape: regular  Reactivity: brisk  Left pupil: Size: 3 mm  Shape: regular  Reactivity: brisk  CN III: no CN III palsy  CN VI: no CN VI palsy  Nystagmus: none   Diplopia: none  Ophthalmoparesis: none  Upgaze: normal  Downgaze: normal  Conjugate gaze: present    CN V   Facial sensation intact  CN VII   Facial expression full, symmetric  CN VIII   Hearing: intact    CN IX, X   Palate: symmetric    CN XI   Right trapezius strength: normal  Left trapezius strength: normal    CN XII   Tongue: not atrophic  Fasciculations: absent  Tongue deviation: none    Motor Exam   Right arm pronator drift: absent  Left arm pronator drift: absent    Strength   Strength 5/5 throughout  Sensory Exam     Decreased LT, PP and temperature sensation in left thigh following left L3 distribution      Gait, Coordination, and Reflexes     Gait  Gait: normal    Coordination   Finger to nose coordination: normal    Reflexes   Right brachioradialis: 1+  Left brachioradialis: 1+  Right biceps: 1+  Left biceps: 1+  Right patellar: 1+  Left patellar: 1+  Right achilles: 1+  Left achilles: 1+           I have spent 45 minutes with Patient  today in which greater than 50% of this time was spent in counseling/coordination of care regarding Diagnostic results, Prognosis, Risks and benefits of tx options, Intructions for management, Patient and family education, Importance of tx compliance, Risk factor reductions, Impressions and Plan of care as above

## 2022-03-30 NOTE — PATIENT INSTRUCTIONS
Stroke:   Continue with combination of Coumadin (INR goal 2-3) and atorvastatin 40mg for secondary stroke prevention   BP goal < 130/80   LDL goal < 100   Defer to PCP regarding glycemic and blood pressure management    Counseling   Discussed that untreated sleep apnea is risk factor for future strokes  Men with moderate-severe sleep apnea are 3x greater risk compared to men without or mild sleep apnea   Overtime untreated sleep apnea can lead to systemic problems with uncontrolled blood pressure and atrial fibrillation   I advised patient to avoid using NSAIDs for headaches or other pain and to stick to tylenol if needed   Recommend mediterranean diet & regular exercise regimen atleast 4-5 times a week for 20-30 minutes   Educated patient/family regarding medication compliance    Recommend follow up 8 months  I would be happy to see the patient sooner if any new questions/concerns arise  Patient/Guardian was advised to the call the office if they have any questions and concerns in the meantime  Patient/Guardian does understand that if they have any new stroke like symptoms such as facial droop on one side, weakness/paralysis on either side, speech trouble, numbness on one side, balance issues, any vision changes, extreme dizziness or any new headache, to call 9-1-1 immediately or to proceed to the nearest ER immediately  Neuropathy:  · Obtain EMG - nerve test in both legs    · Follow up with Dr Kade Sidhu (pain management) as scheduled

## 2022-03-30 NOTE — PROGRESS NOTES
Review of Systems   Constitutional: Negative  Negative for appetite change and fever  HENT: Negative  Negative for hearing loss, tinnitus, trouble swallowing and voice change  Eyes: Negative  Negative for photophobia and pain  Respiratory: Positive for shortness of breath  Cardiovascular: Negative  Negative for palpitations  Gastrointestinal: Negative  Negative for nausea and vomiting  Endocrine: Negative  Negative for cold intolerance  Genitourinary: Negative  Negative for dysuria, frequency and urgency  Musculoskeletal: Negative  Negative for myalgias and neck pain  Skin: Negative  Negative for rash  Neurological: Positive for tremors (Left arm) and numbness (Feet)  Negative for dizziness, seizures, syncope, facial asymmetry, speech difficulty, weakness, light-headedness and headaches  Hematological: Bruises/bleeds easily  Psychiatric/Behavioral: Positive for confusion (Sometimes)  Negative for hallucinations and sleep disturbance

## 2022-04-04 ENCOUNTER — REMOTE DEVICE CLINIC VISIT (OUTPATIENT)
Dept: CARDIOLOGY CLINIC | Facility: CLINIC | Age: 62
End: 2022-04-04
Payer: COMMERCIAL

## 2022-04-04 DIAGNOSIS — Z95.818 PRESENCE OF OTHER CARDIAC IMPLANTS AND GRAFTS: Primary | ICD-10-CM

## 2022-04-04 PROCEDURE — G2066 INTER DEVC REMOTE 30D: HCPCS | Performed by: INTERNAL MEDICINE

## 2022-04-04 PROCEDURE — 93298 REM INTERROG DEV EVAL SCRMS: CPT | Performed by: INTERNAL MEDICINE

## 2022-04-04 NOTE — PROGRESS NOTES
CARELINK TRANSMISSION: LOOP RECORDER  PRESENTING RHYTHM NSR W/ PACs  BATTERY STATUS "OK "   33 DEVICE CLASSIFIED AF EPISODES, AVAILABLE STRIPS DEMONSTRATE NO ATRIAL FIBRILLATION  INSTEAD EGM'S SHOW SR W/ PACs, PVCs AND OVERSENSING  AF BURDEN IS 0%  SOME STRIPS MAY NOT BE INTERPRETABLE OR AVAILABLE, CANNOT DEFINITIVELY RULE OUT ATRIAL FIBRILLATION  NO PATIENT ACTIVATED EPISODES  NORMAL DEVICE FUNCTION   DL

## 2022-04-06 ENCOUNTER — ANTICOAG VISIT (OUTPATIENT)
Dept: FAMILY MEDICINE CLINIC | Facility: CLINIC | Age: 62
End: 2022-04-06

## 2022-04-06 ENCOUNTER — APPOINTMENT (OUTPATIENT)
Dept: LAB | Facility: HOSPITAL | Age: 62
End: 2022-04-06
Payer: COMMERCIAL

## 2022-04-13 ENCOUNTER — TELEPHONE (OUTPATIENT)
Dept: FAMILY MEDICINE CLINIC | Facility: CLINIC | Age: 62
End: 2022-04-13

## 2022-04-13 NOTE — TELEPHONE ENCOUNTER
Patient is on Diltiazem and it is causing him to be confused and felling dizzy  Can he go back on amlodipine   Please give him a call

## 2022-04-14 NOTE — TELEPHONE ENCOUNTER
Patient was having significant amount of edema due to the amlodipine  I would recommend that he discontinue the amlodipine, and can decrease the Cardizem  Would recommend office visit to review that, or he should talk to Cardiology

## 2022-04-19 ENCOUNTER — HOSPITAL ENCOUNTER (OUTPATIENT)
Dept: CT IMAGING | Facility: HOSPITAL | Age: 62
Discharge: HOME | End: 2022-04-19
Payer: COMMERCIAL

## 2022-04-19 ENCOUNTER — OFFICE VISIT (OUTPATIENT)
Dept: FAMILY MEDICINE CLINIC | Facility: CLINIC | Age: 62
End: 2022-04-19
Payer: COMMERCIAL

## 2022-04-19 VITALS
HEART RATE: 60 BPM | DIASTOLIC BLOOD PRESSURE: 76 MMHG | BODY MASS INDEX: 39.71 KG/M2 | SYSTOLIC BLOOD PRESSURE: 150 MMHG | HEIGHT: 68 IN | WEIGHT: 262 LBS

## 2022-04-19 DIAGNOSIS — R27.0 ATAXIA: ICD-10-CM

## 2022-04-19 DIAGNOSIS — I48.0 PAROXYSMAL ATRIAL FIBRILLATION (HCC): ICD-10-CM

## 2022-04-19 DIAGNOSIS — I10 PRIMARY HYPERTENSION: ICD-10-CM

## 2022-04-19 DIAGNOSIS — D68.61 ANTIPHOSPHOLIPID ANTIBODY WITH HYPERCOAGULABLE STATE (HCC): Primary | ICD-10-CM

## 2022-04-19 DIAGNOSIS — I63.9 CEREBROVASCULAR ACCIDENT (CVA), UNSPECIFIED MECHANISM (HCC): ICD-10-CM

## 2022-04-19 DIAGNOSIS — E78.2 MIXED HYPERLIPIDEMIA: Chronic | ICD-10-CM

## 2022-04-19 DIAGNOSIS — D68.61 ANTIPHOSPHOLIPID ANTIBODY WITH HYPERCOAGULABLE STATE (HCC): ICD-10-CM

## 2022-04-19 PROCEDURE — 1036F TOBACCO NON-USER: CPT | Performed by: FAMILY MEDICINE

## 2022-04-19 PROCEDURE — 70450 CT HEAD/BRAIN W/O DYE: CPT

## 2022-04-19 PROCEDURE — 3078F DIAST BP <80 MM HG: CPT | Performed by: FAMILY MEDICINE

## 2022-04-19 PROCEDURE — 3077F SYST BP >= 140 MM HG: CPT | Performed by: FAMILY MEDICINE

## 2022-04-19 PROCEDURE — G1004 CDSM NDSC: HCPCS

## 2022-04-19 PROCEDURE — 3008F BODY MASS INDEX DOCD: CPT | Performed by: FAMILY MEDICINE

## 2022-04-19 PROCEDURE — 99214 OFFICE O/P EST MOD 30 MIN: CPT | Performed by: FAMILY MEDICINE

## 2022-04-19 NOTE — ASSESSMENT & PLAN NOTE
Patient did have bradycardia on exam today  This did not correlate with his blood pressure being too low, but certainly does explain some of the symptoms  Of note, the patient did have a loop recorder analysis recently, and that showed no AFib, only PACs  Would recommend that the patient follow-up with Cardiology about this  Certainly does lower the risk for AFib causing stroke

## 2022-04-19 NOTE — ASSESSMENT & PLAN NOTE
Patient is having increasing ataxia of late  This is going on over the last week or so  Recommend stat CT to rule out CVA  This because he has multiple risk factors including atrial fibrillation, anti phospholipid syndrome, prior failure with anticoagulation, low HDL, high cholesterol  denies pain/discomfort

## 2022-04-19 NOTE — ASSESSMENT & PLAN NOTE
Patient does have some symptoms consistent with CVA  Certainly, the symptoms that he is reporting are quite concerning, especially as they felt similar to his symptoms prior to CVA previously  I would recommend stat CT scan  Given that he is not having significant changes over the last week or 2, could go to the hospital on his own  If he develops any further changes, would immediately call 911 and go by ambulance  Of note, his recent recording for loop recorder did not show AFib, but rather PACs  This still results in irregular heart rate, but from a different underlying cause

## 2022-04-19 NOTE — ASSESSMENT & PLAN NOTE
Blood pressure today is a little bit elevated  He did have a change from amlodipine to diltiazem, and then noted some possible changes with the diltiazem, therefore he hell that  The amlodipine was discontinued secondary to lower extremity edema  At this point, his blood pressure is elevated, but he is also not been taking the diltiazem

## 2022-04-19 NOTE — PROGRESS NOTES
Assessment and Plan:    Problem List Items Addressed This Visit     Antiphospholipid antibody with hypercoagulable state ([facility] [address] ) - Primary     Patient does have anticoagulation for antiphospholipid  He failed on novel anticoagulant before  Given this, I would like to check for CVA  Stat CT  Follow-up afterwards  Relevant Orders    CT head wo contrast    Ataxia     Patient is having increasing ataxia of late  This is going on over the last week or so  Recommend stat CT to rule out CVA  This because he has multiple risk factors including atrial fibrillation, anti phospholipid syndrome, prior failure with anticoagulation, low HDL, high cholesterol  Relevant Orders    CT head wo contrast    Atrial fibrillation [facility])     Patient did have bradycardia on exam today  This did not correlate with his blood pressure being too low, but certainly does explain some of the symptoms  Of note, the patient did have a loop recorder analysis recently, and that showed no AFib, only PACs  Would recommend that the patient follow-up with Cardiology about this  Certainly does lower the risk for AFib causing stroke  Relevant Orders    CT head wo contrast    Ambulatory Referral to Cardiology    CVA (cerebral vascular accident) [facility])     Patient does have some symptoms consistent with CVA  Certainly, the symptoms that he is reporting are quite concerning, especially as they felt similar to his symptoms prior to CVA previously  I would recommend stat CT scan  Given that he is not having significant changes over the last week or 2, could go to the hospital on his own  If he develops any further changes, would immediately call 911 and go by ambulance  Of note, his recent recording for loop recorder did not show AFib, but rather PACs  This still results in irregular heart rate, but from a different underlying cause           Relevant Orders    CT head wo contrast    Hyperlipidemia (Chronic)     Patient's HDL previously was slightly low, but the remainder of the cholesterol was reasonable  Hypertension     Blood pressure today is a little bit elevated  He did have a change from amlodipine to diltiazem, and then noted some possible changes with the diltiazem, therefore he hell that  The amlodipine was discontinued secondary to lower extremity edema  At this point, his blood pressure is elevated, but he is also not been taking the diltiazem  Relevant Orders    CT head wo contrast    Ambulatory Referral to Cardiology                 Diagnoses and all orders for this visit:    Antiphospholipid antibody with hypercoagulable state ([facility] )  -     CT head wo contrast; Future    Primary hypertension  -     CT head wo contrast; Future  -     Ambulatory Referral to Cardiology; Future    Mixed hyperlipidemia    Paroxysmal atrial fibrillation (HCC)  -     CT head wo contrast; Future  -     Ambulatory Referral to Cardiology; Future    Ataxia  -     CT head wo contrast; Future    Cerebrovascular accident (CVA), unspecified mechanism ([facility] )  -     CT head wo contrast; Future            Subjective:      Patient ID: Shi Sharma is a 58 y o  male  CC:    Chief Complaint   Patient presents with    Follow-up     patient c/o being lightheaded/dizzy, nausea  in the last 1-2 weeks  Patient also c/o ongoing right sided lower abdominal pain radiating to his back/flank area  Patient c/o intermittent but ongoing  tingling, numbness in his fingers and toes  ak       HPI:    Is having dizziness, nausea, lightheadedness  Has been going on for about week and half now  Patient also reports he is having right-sided abdominal pain radiating into the flank  He also mentioned that he has some numbness and tingling, which then goes down into toes and fingers  He reported to staff that this felt like what he had prior to CVA previously  His gait is off with this as well  He is also quite concerned driving      Denies any chest pain or pressure at the moment  He noted some issues with the Cardizem, and held it for now  Was on norvasc before, but developed edema  He reports pharmacist said it could also interact with metoprolol  Hypertension:  As above, the patient is currently off Cardizem  He also uses metoprolol succinate 50 mg, 3 tablets daily  Hyperlipidemia:  Currently on atorvastatin 40 mg  Reflux:  Currently on pantoprazole  AFib:  Currently on warfarin  Patient had been on novel anticoagulant before, but failed  He is also on warfarin for the anti phospholipid antibody syndrome  The following portions of the patient's history were reviewed and updated as appropriate: allergies, current medications, past family history, past medical history, past social history, past surgical history and problem list       Review of Systems   Constitutional: Positive for fatigue  HENT: Negative  Eyes: Positive for visual disturbance  Negative for photophobia, pain, discharge, redness and itching  Respiratory: Negative  Cardiovascular: Negative  Gastrointestinal: Negative  Endocrine: Negative  Genitourinary: Negative  Musculoskeletal: Negative  Skin: Negative  Allergic/Immunologic: Negative  Neurological: Positive for dizziness, tremors, weakness, light-headedness and numbness  Hematological: Negative  Psychiatric/Behavioral: Negative  Data to review:       Objective:    Vitals:    04/19/22 0952   BP: 150/76   Pulse: 60   Weight: 119 kg (262 lb)   Height: 5' 8" (1 727 m)        Physical Exam  Vitals and nursing note reviewed  Constitutional:       Appearance: Normal appearance  Neck:      Vascular: No carotid bruit  Cardiovascular:      Rate and Rhythm: Regular rhythm  Bradycardia present  Pulses: Normal pulses  Carotid pulses are 2+ on the right side and 2+ on the left side  Heart sounds: Normal heart sounds  No murmur heard  No gallop  Pulmonary:      Effort: Pulmonary effort is normal  No respiratory distress  Breath sounds: Normal breath sounds  No stridor  No wheezing, rhonchi or rales  Chest:      Chest wall: No tenderness  Neurological:      Mental Status: He is alert  Cranial Nerves: Cranial nerves are intact  Sensory: Sensation is intact  Motor: Motor function is intact  Coordination: Coordination is intact  Gait: Gait is intact  Comments: Rest tremor left hand

## 2022-04-19 NOTE — PATIENT INSTRUCTIONS
Problem List Items Addressed This Visit     Antiphospholipid antibody with hypercoagulable state ([facility] [address] [facility] ) - Primary     Patient does have anticoagulation for antiphospholipid  He failed on novel anticoagulant before  Given this, I would like to check for CVA  Stat CT  Follow-up afterwards  Relevant Orders    CT head wo contrast    Ataxia     Patient is having increasing ataxia of late  This is going on over the last week or so  Recommend stat CT to rule out CVA  This because he has multiple risk factors including atrial fibrillation, anti phospholipid syndrome, prior failure with anticoagulation, low HDL, high cholesterol  Relevant Orders    CT head wo contrast    Atrial fibrillation [facility])     Patient did have bradycardia on exam today  This did not correlate with his blood pressure being too low, but certainly does explain some of the symptoms  Of note, the patient did have a loop recorder analysis recently, and that showed no AFib, only PACs  Would recommend that the patient follow-up with Cardiology about this  Certainly does lower the risk for AFib causing stroke  Relevant Orders    CT head wo contrast    Ambulatory Referral to Cardiology    CVA (cerebral vascular accident) [facility])     Patient does have some symptoms consistent with CVA  Certainly, the symptoms that he is reporting are quite concerning, especially as they felt similar to his symptoms prior to CVA previously  I would recommend stat CT scan  Given that he is not having significant changes over the last week or 2, could go to the hospital on his own  If he develops any further changes, would immediately call 911 and go by ambulance  Of note, his recent recording for loop recorder did not show AFib, but rather PACs  This still results in irregular heart rate, but from a different underlying cause           Relevant Orders    CT head wo contrast    Hyperlipidemia (Chronic)     Patient's HDL previously was slightly low, but the remainder of the cholesterol was reasonable  Hypertension     Blood pressure today is a little bit elevated  He did have a change from amlodipine to diltiazem, and then noted some possible changes with the diltiazem, therefore he hell that  The amlodipine was discontinued secondary to lower extremity edema  At this point, his blood pressure is elevated, but he is also not been taking the diltiazem  Relevant Orders    CT head wo contrast    Ambulatory Referral to Cardiology          COVID 19 Instructions    Mariza Green was advised to limit contact with others to essential tasks such as getting food, medications, and medical care  Proper handwashing reviewed, and Hand sanitzer when washing is not available  If the patient develops symptoms of COVID 19, the patient should call the office as soon as possible  For 5265-7307 Flu season, it is strongly recommended that Flu Vaccinations be obtained  Virtual Visits:  Cr: This works on smart phones (any phone with Internet browsing capability)  You should get a text message when the provider is ready to see you  Click on the link in the text message, and the call should start  You will need to type in your name, and allow camera and microphone access  This is HIPPA compliant, and secure  If you have not already done so, get immunized to COVID 19  We are committed to getting you vaccinated as soon as possible and will be closely following CDC and [facility]  guidelines as they are released and revised  Please refer to our COVID-19 vaccine webpage for the most up to date information on the vaccine and our distribution efforts  This site will also have the most up to date recommendations for COVID booster vaccine      [place]    Call [phone] ([phone]), option 7    OUR NEW LOCATION:    [address], [address] [place] [address], [address]  Fax: [phone]    Lab services and OB/GYN are at this location as well

## 2022-04-19 NOTE — ASSESSMENT & PLAN NOTE
Patient does have anticoagulation for antiphospholipid  He failed on novel anticoagulant before  Given this, I would like to check for CVA  Stat CT  Follow-up afterwards

## 2022-04-20 ENCOUNTER — TELEPHONE (OUTPATIENT)
Dept: FAMILY MEDICINE CLINIC | Facility: CLINIC | Age: 62
End: 2022-04-20

## 2022-04-20 NOTE — TELEPHONE ENCOUNTER
----- Message from Kimi Espinosa MD sent at 4/19/2022  5:16 PM EDT -----  Tests were normal  Please follow up at next office visit as scheduled

## 2022-04-20 NOTE — TELEPHONE ENCOUNTER
Patient notified of Ct scan result and recommendations  Pt will like to know why was he Rx azithromycin and should he take it  He states he is not having any sinus issues  Please advise

## 2022-04-20 NOTE — TELEPHONE ENCOUNTER
The CT scan found that he was having sinus opacification, which is an indication that he has a sinus infection  Coupled with the dizziness that he was having, I do feel that he is having problems that may be treated by antibiotics  If this does not help, would recommend he follow-up with ear nose and throat

## 2022-05-03 ENCOUNTER — OFFICE VISIT (OUTPATIENT)
Dept: FAMILY MEDICINE CLINIC | Facility: CLINIC | Age: 62
End: 2022-05-03
Payer: COMMERCIAL

## 2022-05-03 VITALS
BODY MASS INDEX: 39.62 KG/M2 | HEIGHT: 68 IN | SYSTOLIC BLOOD PRESSURE: 170 MMHG | WEIGHT: 261.4 LBS | DIASTOLIC BLOOD PRESSURE: 90 MMHG

## 2022-05-03 DIAGNOSIS — I48.0 PAROXYSMAL ATRIAL FIBRILLATION (HCC): ICD-10-CM

## 2022-05-03 DIAGNOSIS — I63.9 CEREBROVASCULAR ACCIDENT (CVA), UNSPECIFIED MECHANISM (HCC): ICD-10-CM

## 2022-05-03 DIAGNOSIS — I10 PRIMARY HYPERTENSION: ICD-10-CM

## 2022-05-03 DIAGNOSIS — R10.9 FLANK PAIN: Primary | ICD-10-CM

## 2022-05-03 DIAGNOSIS — D68.61 ANTIPHOSPHOLIPID ANTIBODY WITH HYPERCOAGULABLE STATE (HCC): ICD-10-CM

## 2022-05-03 LAB
SL AMB  POCT GLUCOSE, UA: NEGATIVE
SL AMB LEUKOCYTE ESTERASE,UA: NEGATIVE
SL AMB POCT BILIRUBIN,UA: NEGATIVE
SL AMB POCT BLOOD,UA: NEGATIVE
SL AMB POCT CLARITY,UA: CLEAR
SL AMB POCT COLOR,UA: YELLOW
SL AMB POCT KETONES,UA: NEGATIVE
SL AMB POCT NITRITE,UA: NEGATIVE
SL AMB POCT PH,UA: 5.5
SL AMB POCT SPECIFIC GRAVITY,UA: 1.02
SL AMB POCT URINE PROTEIN: NEGATIVE
SL AMB POCT UROBILINOGEN: 0.2

## 2022-05-03 PROCEDURE — 99214 OFFICE O/P EST MOD 30 MIN: CPT | Performed by: FAMILY MEDICINE

## 2022-05-03 PROCEDURE — 3077F SYST BP >= 140 MM HG: CPT | Performed by: FAMILY MEDICINE

## 2022-05-03 PROCEDURE — 3080F DIAST BP >= 90 MM HG: CPT | Performed by: FAMILY MEDICINE

## 2022-05-03 PROCEDURE — 81002 URINALYSIS NONAUTO W/O SCOPE: CPT | Performed by: FAMILY MEDICINE

## 2022-05-03 RX ORDER — ACETAMINOPHEN 500 MG
500 TABLET ORAL EVERY 6 HOURS PRN
COMMUNITY

## 2022-05-03 NOTE — PATIENT INSTRUCTIONS
Problem List Items Addressed This Visit     Antiphospholipid antibody with hypercoagulable state ([facility] [address] )     Continue warfarin         Atrial fibrillation (HCC)     Stable  No Change  Continue on warfarin, heart rate control  CVA (cerebral vascular accident) [facility])     Old CVA  CT scan showed no change  Follow-up as needed  He is continuing with dizziness, consider ENT verses Neurology  Given the continued dizziness, okay for temporary handicap placard  Relevant Orders    Ambulatory Referral to Neurology    Hypertension     Blood pressure is clearly elevated  He is not currently on diltiazem  Since he is off the medication, and continues to have pain and dizziness, would recommend restart  Other Visit Diagnoses     Flank pain    -  Primary    Significant flank pain, consistent with kidney stone  Check ultrasound  UA was negative  Relevant Orders    POCT urine dip    US kidney and bladder          COVID 19 Instructions    Michael Migel was advised to limit contact with others to essential tasks such as getting food, medications, and medical care  Proper handwashing reviewed, and Hand sanitzer when washing is not available  If the patient develops symptoms of COVID 19, the patient should call the office as soon as possible  For 9413-0080 Flu season, it is strongly recommended that Flu Vaccinations be obtained  Virtual Visits:  Cr: This works on smart phones (any phone with Internet browsing capability)  You should get a text message when the provider is ready to see you  Click on the link in the text message, and the call should start  You will need to type in your name, and allow camera and microphone access  This is HIPPA compliant, and secure  If you have not already done so, get immunized to COVID 19        We are committed to getting you vaccinated as soon as possible and will be closely following CDC and [facility]  guidelines as they are released and revised  Please refer to our COVID-19 vaccine webpage for the most up to date information on the vaccine and our distribution efforts  This site will also have the most up to date recommendations for COVID booster vaccine  Jose gallegos    Call [phone] ([phone]), option 7    OUR NEW LOCATION:    [address], [address] [place], [place] [address] , [place], [address]  Fax: [phone]    Lab services and OB/GYN are at this location as well

## 2022-05-03 NOTE — ASSESSMENT & PLAN NOTE
Blood pressure is clearly elevated  He is not currently on diltiazem  Since he is off the medication, and continues to have pain and dizziness, would recommend restart

## 2022-05-03 NOTE — PROGRESS NOTES
Assessment and Plan:    Problem List Items Addressed This Visit     Antiph[facility] antibody with [facility]coagulable [facility] )     Continue warfarin         Atrial fibrillation (HCC)     Stable  No Change  Continue on warfarin, heart rate control  CVA (cerebral vascular accident) [facility])     Old CVA  CT scan showed no change  Follow-up as needed  He is continuing with dizziness, consider ENT verses Neurology  Given the continued dizziness, okay for temporary handicap placard  Relevant Orders    Ambulatory Referral to Neurology    Hypertension     Blood pressure is clearly elevated  He is not currently on diltiazem  Since he is off the medication, and continues to have pain and dizziness, would recommend restart  Other Visit Diagnoses     Flank pain    -  Primary    Significant flank pain, consistent with kidney stone  Check ultrasound  UA was negative  Relevant Orders    POCT urine dip    US kidney and bladder                 Diagnoses and all orders for this visit:    Flank pain  Comments:  Significant flank pain, consistent with kidney stone  Check ultrasound  UA was negative  Orders:  -     POCT urine dip  -     US kidney and bladder; Future    Cerebrovascular accident (CVA), unspecified mechanism ([facility] )  -     Ambulatory Referral to Neurology; Future    Antiphospholipid antibody with hypercoagulable state ([facility] )    Paroxysmal atrial fibrillation [facility] )    Primary hypertension    Other orders  -     acetaminophen (TYLENOL) 500 mg tablet; Take 500 mg by mouth every 6 (six) hours as needed for mild pain 2 tab            Subjective:      Patient ID: Mariza Green is a 58 y o  male  CC:    Chief Complaint   Patient presents with    Follow-up     Pt is present today for 2 week follow up       HPI:    Patient is here to follow-up after his last visit  CT scan reviewed  There were no new changes  CVA was the same as before, as were vascular changes      Patient is still having significant amount of dizziness  He continues to some nausea along with it  Patient is having some back pain, with radiation around to the right, and into the groin  Again, the back pain is on the right  Pain is variable throughout the day  Pain is quite significant  Again, comes and goes  Feels quite severe when he has it  He was quite concerned about kidney stone  Has not been seeing any ivan blood  AFib:  No concerns at the moment  Antiphospholipid:  Patient is continuing on warfarin  He did fail previously with CVA post novel anticoagulant        The following portions of the patient's history were reviewed and updated as appropriate: allergies, current medications, past family history, past medical history, past social history, past surgical history and problem list       Review of Systems      Data to review:       Objective:    Vitals:    05/03/22 1343   BP: 170/90   BP Location: Right arm   Patient Position: Sitting   Cuff Size: Standard   Weight: 119 kg (261 lb 6 4 oz)   Height: 5' 8" (1 727 m)        Physical Exam

## 2022-05-03 NOTE — ASSESSMENT & PLAN NOTE
Old CVA  CT scan showed no change  Follow-up as needed  He is continuing with dizziness, consider ENT verses Neurology  Given the continued dizziness, okay for temporary handicap placard

## 2022-05-06 ENCOUNTER — CONSULT (OUTPATIENT)
Dept: PAIN MEDICINE | Facility: MEDICAL CENTER | Age: 62
End: 2022-05-06
Payer: COMMERCIAL

## 2022-05-06 VITALS
OXYGEN SATURATION: 96 % | HEART RATE: 62 BPM | DIASTOLIC BLOOD PRESSURE: 82 MMHG | BODY MASS INDEX: 39.1 KG/M2 | TEMPERATURE: 97.8 F | HEIGHT: 68 IN | SYSTOLIC BLOOD PRESSURE: 126 MMHG | WEIGHT: 258 LBS

## 2022-05-06 DIAGNOSIS — G89.29 CHRONIC RIGHT-SIDED LOW BACK PAIN WITHOUT SCIATICA: Primary | ICD-10-CM

## 2022-05-06 DIAGNOSIS — M47.816 LUMBAR SPONDYLOSIS: ICD-10-CM

## 2022-05-06 DIAGNOSIS — M54.50 CHRONIC RIGHT-SIDED LOW BACK PAIN WITHOUT SCIATICA: Primary | ICD-10-CM

## 2022-05-06 PROCEDURE — 1036F TOBACCO NON-USER: CPT | Performed by: PHYSICAL MEDICINE & REHABILITATION

## 2022-05-06 PROCEDURE — 99204 OFFICE O/P NEW MOD 45 MIN: CPT | Performed by: PHYSICAL MEDICINE & REHABILITATION

## 2022-05-06 PROCEDURE — 3008F BODY MASS INDEX DOCD: CPT | Performed by: PHYSICAL MEDICINE & REHABILITATION

## 2022-05-06 NOTE — PATIENT INSTRUCTIONS
Arthritis   WHAT YOU NEED TO KNOW:   Arthritis is pain or disease in one or more joints  There are many types of arthritis  Types such as rheumatoid arthritis cause inflammation in the joints  Other types wear away the cartilage between joints, such as osteoarthritis  This makes the bones of the joint rub together when you move the joint  An infection from bacteria, a virus, or a fungus can also cause arthritis  Your symptoms may be constant, or symptoms may come and go  Arthritis often gets worse over time and can cause permanent joint damage  DISCHARGE INSTRUCTIONS:   Call your doctor or rheumatologist if:   · You have a fever and severe joint pain or swelling  · You cannot move the affected joint  · You have severe joint pain you cannot tolerate  · You have a new or worsening rash  · Your pain or swelling does not get better with treatment  · You have questions or concerns about your condition or care  Medicines:   · Acetaminophen  decreases pain and fever  It is available without a doctor's order  Ask how much to take and how often to take it  Follow directions  Read the labels of all other medicines you are using to see if they also contain acetaminophen, or ask your doctor or pharmacist  Acetaminophen can cause liver damage if not taken correctly  Do not use more than 4 grams (4,000 milligrams) total of acetaminophen in one day  · NSAIDs , such as ibuprofen, help decrease swelling, pain, and fever  This medicine is available with or without a doctor's order  NSAIDs can cause stomach bleeding or kidney problems in certain people  If you take blood thinner medicine, always ask your healthcare provider if NSAIDs are safe for you  Always read the medicine label and follow directions  · Steroids  reduce swelling and pain  · Prescription pain medicine  may be given  Ask your healthcare provider how to take this medicine safely  Some prescription pain medicines contain acetaminophen   Do not take other medicines that contain acetaminophen without talking to your healthcare provider  Too much acetaminophen may cause liver damage  Prescription pain medicine may cause constipation  Ask your healthcare provider how to prevent or treat constipation  · Take your medicine as directed  Contact your healthcare provider if you think your medicine is not helping or if you have side effects  Tell him of her if you are allergic to any medicine  Keep a list of the medicines, vitamins, and herbs you take  Include the amounts, and when and why you take them  Bring the list or the pill bottles to follow-up visits  Carry your medicine list with you in case of an emergency  Manage your symptoms:   · Rest your painful joint so it can heal   Your healthcare provider may recommend crutches or a walker if the affected joint is in a leg  · Apply ice or heat to the joint  Both can help decrease swelling and pain  Ice may also help prevent tissue damage  Use an ice pack, or put crushed ice in a plastic bag  Cover it with a towel and place it on your joint for 15 to 20 minutes every hour or as directed  You can apply heat for 20 minutes every 2 hours  Heat treatment includes hot packs or heat lamps  · Elevate your joint  Elevation helps reduce swelling and pain  Raise your joint above the level of your heart as often as you can  Prop your painful joint on pillows to keep it above your heart comfortably  Manage arthritis:   · Talk to your healthcare providers about your arthritis medicines  Some medicines may only be needed when you have arthritis pain  You may need to take other medicines every day to prevent arthritis from getting worse  Your healthcare providers will help you understand all your medicines and when to take them  It is important to take the medicines as directed, even if you start to feel better  You can continue to have joint damage and inflammation even if you do not feel it      · Eat a variety of healthy foods  Healthy foods include fruits, vegetables, whole-grain breads, low-fat dairy products, beans, lean meats, and fish  Ask if you need to be on a special diet  A diet rich in calcium and vitamin D may decrease your risk of osteoporosis  Foods high in calcium include milk, cheese, broccoli, and tofu  Vitamin D may be found in meat, fish, fortified milk, cereal and bread  Ask if you need calcium or vitamin D supplements  · Go to physical or occupational therapy as directed  A physical therapist can teach you exercises to improve flexibility and range of motion  You may also be shown non-weight-bearing exercises that are safe for your joints, such as swimming  Exercise can help keep your joints flexible and reduce pain  An occupational therapist can help you learn to do your daily activities when your joints are stiff or sore  · Maintain a healthy weight  Extra weight puts increased pressure on your joints  Ask your healthcare provider what you should weigh  If you need to lose weight, he or she can help you create a weight loss program  Weight loss can help reduce pain and increase your ability to do your activities  · Wear flat or low-heeled shoes  This will help decrease pain and reduce pressure on your ankle, knee, and hip joints  · Do not smoke  Nicotine and other chemicals in cigarettes and cigars can damage your bones and joints  Ask your healthcare provider for information if you currently smoke and need help to quit  E-cigarettes or smokeless tobacco still contain nicotine  Talk to your healthcare provider before you use these products  Support devices:   · Orthotic shoes or insoles  help support your feet when you walk  · Crutches, a cane, or a walker  may help decrease your risk for falling  They also decrease stress on affected joints  · Devices to prevent falls  include raised toilet seats and bathtub bars to help you get up from sitting   Handrails can be placed in areas where you need balance and support  · Devices to help with support and rest  include splints to wear on your hands and a firm pillow while you sleep  Use a pillow that is firm enough to support your neck and head  Follow up with your healthcare provider or rheumatologist as directed:  Write down your questions so you remember to ask them during your visits  © Copyright [employer] 2022 Information is for End User's use only and may not be sold, redistributed or otherwise used for commercial purposes  All illustrations and images included in CareNotes® are the copyrighted property of [employer]  or [address] [place], [place] [address]   The above information is an  only  It is not intended as medical advice for individual conditions or treatments  Talk to your doctor, nurse or pharmacist before following any medical regimen to see if it is safe and effective for you

## 2022-05-06 NOTE — PROGRESS NOTES
Assessment  1  Chronic right-sided low back pain without sciatica    2  Lumbar spondylosis        Plan  1  The patient is a candidate for right-sided L3-5 medial branch nerve blocks  He would like to hold off on interventional procedures until he concludes his ultrasound workup for kidney stones  If he does continue to have back pain would recommend pursuing the diagnostic blocks in moving forward towards radiofrequency ablation if he has a positive response  My impressions and treatment recommendations were discussed in detail with the patient who verbalized understanding and had no further questions  Discharge instructions were provided  I personally saw and examined the patient and I agree with the above discussed plan of care  No orders of the defined types were placed in this encounter  No orders of the defined types were placed in this encounter  History of Present Illness    Say Paris is a 58 y o  male seen in consultation at the request of Dr Krys Seaman regarding chronic right lower back pain  Patient has been experiencing symptoms over the past 4 months without any specific inciting event or trauma  He describes moderate to severe intensity pain rated as a 5 and greater/10  The pain is intermittent in nature without any typical pattern characterized as burning, pins and needles, dull, aching  He describes pain in the right lower back with referral into the right groin and hip region  Aggravating factors include lying down, standing, bending, sitting, walking, relaxation  Alleviating factors are unknown  Diagnostic studies include x-rays of the lumbar spine demonstrating some facet arthropathy in the lower lumbar region  He has participated in chiropractic treatment with moderate relief, moderate relief with 10s unit as well as local heat or ice application  Social history negative for tobacco marijuana and alcohol use      Currently using acetaminophen for pain relief  He is on warfarin for anticoagulation and cannot take NSAID medications  I have personally reviewed and/or updated the patient's past medical history, past surgical history, family history, social history, current medications, allergies, and vital signs today  Review of Systems   Constitutional: Negative for fever and unexpected weight change  HENT: Negative for trouble swallowing  Eyes: Positive for visual disturbance  Respiratory: Positive for shortness of breath  Negative for wheezing  Cardiovascular: Negative for chest pain and palpitations  Gastrointestinal: Negative for constipation, diarrhea, nausea and vomiting  Endocrine: Negative for cold intolerance, heat intolerance and polydipsia  Genitourinary: Negative for difficulty urinating and frequency  Musculoskeletal: Positive for myalgias  Negative for arthralgias, gait problem and joint swelling  Skin: Negative for rash  Neurological: Positive for dizziness and numbness  Negative for seizures, syncope, weakness and headaches  Hematological: Does not bruise/bleed easily  Psychiatric/Behavioral: Positive for decreased concentration and dysphoric mood  The patient is nervous/anxious  All other systems reviewed and are negative        Patient Active Problem List   Diagnosis    Numbness    H/O aortic aneurysm repair    Hypertension    GERD (gastroesophageal reflux disease)    Anxiety disorder    Atrial fibrillation (HCC)    Constipation    Irritable bowel syndrome    Hyperlipidemia    Kidney stones    Panic attack    Peyronie disease    Vitamin D deficiency    Class 2 obesity in adult    Other viral warts    Physical deconditioning    History of stroke    Antiphospholipid antibody with hypercoagulable state ([facility] )    Hyperbilirubinemia    CORIE (obstructive sleep apnea)    Anticoagulated on Coumadin    Tremor    Weakness of both lower extremities    Gastric polyps    Gastric AVM    Colon polyp    Edema of both lower legs    Non-recurrent bilateral inguinal hernia without obstruction or gangrene    S/P laparoscopic hernia repair    Low back pain    Alkaline phosphatase elevation    Right inguinal pain    Ataxia    CVA [facility]ce[facility] vascular accident[facility])       Past Medical History:   Diagnosis Date    Aneurysm of thoracic aorta ([address] )     last assessed 11/20/17    Anxiety     currently on no meds    Arthritis     Bilateral inguinal hernia     Cardiac disease     Depression     GERD (gastroesophageal reflux disease)     Hearing loss of aging     Hyperlipidemia     Hypertension     Irritable bowel syndrome     Kidney stone     Occasional tremors     left arm since stroke    Palpitations     PONV (postoperative nausea and vomiting)     and headache x 3 days    Sciatica     right and left  side    Shortness of breath     on exertion    Sleep apnea     is not using a CPAP    Spitting up blood 5/21/2021    Resolved    Status post placement of implantable loop recorder     Stroke ([facility] [address] ) 11/2014    Stroke ([facility] [address] ) 03/2021    TIA (transient ischemic attack)        Past Surgical History:   Procedure Laterality Date    AORTA SURGERY      thoracic - aneurysmorrhaphy    APPENDECTOMY      ASCENDING AORTIC ANEURYSM REPAIR      resolved 8/19/15    CARDIAC LOOP RECORDER      CHOLECYSTECTOMY      laparoscopic    COLONOSCOPY      CYSTOSCOPY      with removal of object- stent removal    KNEE ARTHROSCOPY Right 1996    with medial meniscus repair    LITHOTRIPSY      renal    ORTHOPEDIC SURGERY      LA FRAGMENT KIDNEY STONE/ ESWL Left 5/17/2018    Procedure: LITHROTRIPSY EXTRACORPORAL SHOCKWAVE (ESWL);   Surgeon: Farhan Lucas MD;  [facility];  Service: Urology    LA [address] Bilateral 12/9/2021    Procedure: ROBOTIC REPAIR HERNIA INGUINAL;  Surgeon: Telma Walker MD;  Location: [facility];  Service: General    THUMB ARTHROSCOPY Right 1976    ligament repair    UPPER GASTROINTESTINAL ENDOSCOPY         Family History   Problem Relation Age of Onset    Diverticulitis Mother         of colon    Nephrolithiasis Mother     Emphysema Father     Nephrolithiasis Sister     Heart attack Maternal Grandfather 72    Breast cancer Paternal Grandmother     Lung cancer Paternal Grandfather     Cancer Family     Coronary artery disease Family     Diabetes Family         sibling    Hypertension Family         sibling    Hernia Family         sibling       Social History     Occupational History    Occupation: disabled     Tobacco Use    Smoking status: Never Smoker    Smokeless tobacco: Never Used    Tobacco comment: no second hand smoke exposure   Vaping Use    Vaping Use: Never used   Substance and Sexual Activity    Alcohol use: Not Currently    Drug use: No    Sexual activity: Not on file       Current Outpatient Medications on File Prior to Visit   Medication Sig    acetaminophen (TYLENOL) 500 mg tablet Take 500 mg by mouth every 6 (six) hours as needed for mild pain 2 tab    atorvastatin (LIPITOR) 40 mg tablet TAKE 1 TABLET BY MOUTH DAILY WITH DINNER    diltiazem (CARDIZEM CD) 240 mg 24 hr capsule Take 1 capsule (240 mg total) by mouth daily    famotidine (PEPCID) 40 MG tablet TAKE 1 TABLET BY MOUTH EVERY DAY    metoprolol succinate (TOPROL-XL) 50 mg 24 hr tablet TAKE 3 TABLETS BY MOUTH EVERY DAY    pantoprazole (PROTONIX) 40 mg tablet TAKE 1 TABLET BY MOUTH EVERY DAY    warfarin (COUMADIN) 1 mg tablet TAKE 1 TABLET DAILY WITH 5MG TABS OR AS DIRECTED    warfarin (COUMADIN) 5 mg tablet Take 1 tablet (5 mg total) by mouth daily    amoxicillin (AMOXIL) 500 mg capsule  (Patient not taking: Reported on 5/6/2022 )    aspirin (ECOTRIN LOW STRENGTH) 81 mg EC tablet Take 1 tablet (81 mg total) by mouth daily (Patient not taking: Reported on 3/16/2022 )     No current facility-administered medications on file prior to visit         Allergies   Allergen Reactions    Losartan Angioedema    Aspirin Fatigue     Due to blood thinners      Bactrim [Sulfamethoxazole-Trimethoprim]     Eliquis [Apixaban] Other (See Comments)     Failed  Had embolic CVA    Lisinopril      Felt bad, was OK with Zestril brand name   Tramadol        Physical Exam    /82   Pulse 62   Temp 97 8 °F (36 6 °C)   Ht 5' 8" (1 727 m)   Wt 117 kg (258 lb)   SpO2 96%   BMI 39 23 kg/m²     LUMBAR  General: Well-developed, well-nourished individual in no acute distress  Mental: Appropriate mood and affect  Grossly oriented with coherent speech and thought processing   Neuro:  Cranial nerves: Cranial nerve function is grossly intact bilaterally   Strength: Bilateral lower extremity strength is normal and symmetric   No atrophy or tone abnormalities noted   Reflexes: Bilateral lower extremity muscle stretch reflexes are physiologic and symmetric   No ankle clonus is noted   Sensation: No loss of sensation is noted   SLR/Foraminal Compression Maneuvers: Straight leg raising in the sitting position is negative for radicular pain   Gait:  Gait/gross motor: Gait is normal  Station is normal  Toe walking, heel walking  are normal     Musculoskeletal:  Palpation: Inspection and palpation of the spine and extremities are unremarkable except for tenderness to palpation along the right-sided lumbar facet joints  Spine: Normal pain-free range of motion except for end range lumbar extension with rotation which is limited  No gross axial skeletal deformities   Hip:  Passive hip range of motion does not reproduce any of his right-sided groin pain  Skin: Skin inspection grossly negative for erythema, breakdown, or concerning lesions in affected area   Lymph: No lymphadenopathy is appreciated in the involved extremity   Vessels: No lower extremity edema   Lungs: Breathing is comfortable and regular  No dyspnea noted during examination       Eyes: Visual field grossly intact to confrontation  No redness appreciated  ENT: No craniofacial deformities or asymmetry  No neck masses appreciated  Imaging  Study Result    Narrative & Impression   LUMBAR SPINE     INDICATION:   M54 50: Low back pain, unspecified  G89 29: Other chronic pain    Right-sided back pain for several months      COMPARISON:  Chest, abdomen, pelvic CT from 5/21/2021, and lumbar spine MR from 5/14/2021      VIEWS:  XR SPINE LUMBAR MINIMUM 4 VIEWS NON INJURY        FINDINGS:     There are 5 non rib bearing lumbar vertebral bodies       There is no evidence of acute fracture or destructive osseous lesion      Alignment is unremarkable       Small anterior osteophyte formations throughout the lumbar spine, without significant disc space narrowing      The pedicles appear intact      Soft tissues are unremarkable      IMPRESSION:     No acute osseous abnormality        Mild degenerative changes as described         Workstation performed: [id number]

## 2022-05-09 ENCOUNTER — ANTICOAG VISIT (OUTPATIENT)
Dept: FAMILY MEDICINE CLINIC | Facility: CLINIC | Age: 62
End: 2022-05-09

## 2022-05-09 ENCOUNTER — APPOINTMENT (OUTPATIENT)
Dept: LAB | Facility: HOSPITAL | Age: 62
End: 2022-05-09
Payer: COMMERCIAL

## 2022-05-09 ENCOUNTER — HOSPITAL ENCOUNTER (OUTPATIENT)
Dept: ULTRASOUND IMAGING | Facility: HOSPITAL | Age: 62
Discharge: HOME | End: 2022-05-09
Payer: COMMERCIAL

## 2022-05-09 DIAGNOSIS — R10.9 FLANK PAIN: ICD-10-CM

## 2022-05-09 PROCEDURE — 76770 US EXAM ABDO BACK WALL COMP: CPT

## 2022-05-12 ENCOUNTER — TELEPHONE (OUTPATIENT)
Dept: FAMILY MEDICINE CLINIC | Facility: CLINIC | Age: 62
End: 2022-05-12

## 2022-05-12 NOTE — TELEPHONE ENCOUNTER
Marino notified of results  Pt states he is still experiencing pain rating an 8  Pt will like to know if you have any other recommendations

## 2022-05-12 NOTE — TELEPHONE ENCOUNTER
----- Message from Paula Salinas MD sent at 5/11/2022  7:18 PM EDT -----  Tests were normal  Please follow up at next office visit as scheduled

## 2022-05-13 NOTE — TELEPHONE ENCOUNTER
US was negative for stone, so I have to assume that he 1: passed a stone, or 2: has back pain that mimic this as discussed with pain management  I would recommend follow with pain management

## 2022-05-24 DIAGNOSIS — K21.9 GASTROESOPHAGEAL REFLUX DISEASE WITHOUT ESOPHAGITIS: ICD-10-CM

## 2022-05-24 RX ORDER — FAMOTIDINE 40 MG/1
TABLET, FILM COATED ORAL
Qty: 90 TABLET | Refills: 1 | Status: SHIPPED | OUTPATIENT
Start: 2022-05-24

## 2022-06-02 ENCOUNTER — OFFICE VISIT (OUTPATIENT)
Dept: FAMILY MEDICINE CLINIC | Facility: CLINIC | Age: 62
End: 2022-06-02
Payer: COMMERCIAL

## 2022-06-02 VITALS
OXYGEN SATURATION: 98 % | HEIGHT: 68 IN | RESPIRATION RATE: 16 BRPM | DIASTOLIC BLOOD PRESSURE: 82 MMHG | TEMPERATURE: 98.5 F | WEIGHT: 252 LBS | BODY MASS INDEX: 38.19 KG/M2 | SYSTOLIC BLOOD PRESSURE: 138 MMHG | HEART RATE: 64 BPM

## 2022-06-02 DIAGNOSIS — I73.9 PVD (PERIPHERAL VASCULAR DISEASE) (HCC): Primary | ICD-10-CM

## 2022-06-02 DIAGNOSIS — N20.0 KIDNEY STONES: ICD-10-CM

## 2022-06-02 DIAGNOSIS — I10 PRIMARY HYPERTENSION: ICD-10-CM

## 2022-06-02 DIAGNOSIS — R10.9 FLANK PAIN: ICD-10-CM

## 2022-06-02 DIAGNOSIS — I48.0 PAROXYSMAL ATRIAL FIBRILLATION (HCC): ICD-10-CM

## 2022-06-02 PROCEDURE — 99214 OFFICE O/P EST MOD 30 MIN: CPT | Performed by: FAMILY MEDICINE

## 2022-06-02 RX ORDER — FUROSEMIDE 20 MG/1
20 TABLET ORAL DAILY
Qty: 30 TABLET | Refills: 2 | Status: SHIPPED | OUTPATIENT
Start: 2022-06-02 | End: 2022-08-02

## 2022-06-02 NOTE — PATIENT INSTRUCTIONS
Problem List Items Addressed This Visit       Atrial fibrillation ([facility] )     On Warfarin  Has been OK  No concerns  Hypertension     Blood pressure stable  No change  Relevant Medications    furosemide (LASIX) 20 mg tablet    Kidney stones     Patient does have flank pain, but had negative ultrasound previously  No kidney stone  Relevant Medications    furosemide (LASIX) 20 mg tablet    PVD (peripheral vascular disease) ([facility] ) - Primary     Patient does appear to have some peripheral vascular disease issues, including some venous stasis changes on the lower extremities  There is also some edema  Check venous ultrasound extremity  Would like to rule out peripheral vascular disease as cause  Patient did have an ANA done in August of 2021, which was normal, and he has reasonable pulses  This suggest that he has vein cause of the problem, not artery  Patient can use compression socks, and can consider using diuretics  Relevant Medications    furosemide (LASIX) 20 mg tablet    Other Relevant Orders    VAS lower limb venous duplex study, complete bilateral          Other Visit Diagnoses       Flank pain        Continued right flank pain  Consider PT  Ultrasound was negative before  Possible muscle  Follow with PT PRN  COVID 19 Instructions    Hans Miranda was advised to limit contact with others to essential tasks such as getting food, medications, and medical care  Proper handwashing reviewed, and Hand sanitzer when washing is not available  If the patient develops symptoms of COVID 19, the patient should call the office as soon as possible  For 4439-7001 Flu season, it is strongly recommended that Flu Vaccinations be obtained  Virtual Visits:  Cr: This works on smart phones (any phone with Internet browsing capability)  You should get a text message when the provider is ready to see you    Click on the link in the text message, and the call should start  You will need to type in your name, and allow camera and microphone access  This is HIPPA compliant, and secure  If you have not already done so, get immunized to COVID 19  We are committed to getting you vaccinated as soon as possible and will be closely following CDC and [facility]  guidelines as they are released and revised  Please refer to our COVID-19 vaccine webpage for the most up to date information on the vaccine and our distribution efforts  This site will also have the most up to date recommendations for COVID booster vaccine  Jose gallegos    Call [phone] ([phone]), option 7    OUR NEW LOCATION:    [address], [address] [place], [place] [address] [address] , [place], [address]  Fax: [phone]    Lab services and OB/GYN are at this location as well

## 2022-06-02 NOTE — PROGRESS NOTES
Assessment and Plan:    Problem List Items Addressed This Visit     Atrial fibrillation ([email or web address] )     On Warfarin  Has been OK  No concerns  Hypertension     Blood pressure stable  No change  Relevant Medications    furosemide (LASIX) 20 mg tablet    Kidney stones     Patient does have flank pain, but had negative ultrasound previously  No kidney stone  Relevant Medications    furosemide (LASIX) 20 mg tablet    PVD (peripheral vascular disease) ([facility] ) - Primary     Patient does appear to have some peripheral vascular disease issues, including some venous stasis changes on the lower extremities  There is also some edema  Check venous ultrasound extremity  Would like to rule out peripheral vascular disease as cause  Patient did have an ANA done in August of 2021, which was normal, and he has reasonable pulses  This suggest that he has vein cause of the problem, not artery  Patient can use compression socks, and can consider using diuretics  Relevant Medications    furosemide (LASIX) 20 mg tablet    Other Relevant Orders    VAS lower limb venous duplex study, complete bilateral      Other Visit Diagnoses     Flank pain        Continued right flank pain  Consider PT  Ultrasound was negative before  Possible muscle  Follow with PT PRN  Diagnoses and all orders for this visit:    PVD (peripheral vascular disease) ([facility] )  -     VAS lower limb venous duplex study, complete bilateral; Future  -     furosemide (LASIX) 20 mg tablet; Take 1 tablet (20 mg total) by mouth daily    Flank pain  Comments:  Continued right flank pain  Consider PT  Ultrasound was negative before  Possible muscle  Follow with PT PRN  Paroxysmal atrial fibrillation (HCC)    Primary hypertension    Kidney stones            Subjective:      Patient ID: Yue Rojas is a 58 y o  male      CC:    Chief Complaint   Patient presents with    Leg Swelling     B/L    Back Pain    Dizziness HPI:    Patient is here because of edema in the lower extremities  Noted some swelling  Increased swelling over the last week or so  He has some discoloration in the lower extremities as well  That seems to improve wear the socks were, but not completely gone  Discoloration has been present for about a year now  He also mention that he gets neuropathy symptoms in the 5th and 4th toes on both sides  Back pain: Has been since September  The following portions of the patient's history were reviewed and updated as appropriate: allergies, current medications, past family history, past medical history, past social history, past surgical history and problem list       Review of Systems   Constitutional: Negative  HENT: Negative  Respiratory: Negative  Cardiovascular: Positive for leg swelling  Data to review:       Objective:    Vitals:    22 1331   BP: 138/82   BP Location: Left arm   Patient Position: Sitting   Cuff Size: Large   Pulse: 64   Resp: 16   Temp: 98 5 °F (36 9 °C)   TempSrc: Tympanic   SpO2: 98%   Weight: 114 kg (252 lb)   Height: 5' 8" (1 727 m)        Physical Exam  Vitals and nursing note reviewed  Constitutional:       Appearance: Normal appearance  Neck:      Vascular: No carotid bruit  Cardiovascular:      Rate and Rhythm: Normal rate and regular rhythm  Pulses: Normal pulses  Carotid pulses are 2+ on the right side and 2+ on the left side  Heart sounds: Normal heart sounds  No murmur heard  No gallop  Pulmonary:      Effort: Pulmonary effort is normal  No respiratory distress  Breath sounds: Normal breath sounds  No stridor  No wheezing, rhonchi or rales  Chest:      Chest wall: No tenderness  Musculoskeletal:      Right lower le+ Pitting Edema present  Left lower le+ Pitting Edema present  Neurological:      Mental Status: He is alert

## 2022-06-02 NOTE — ASSESSMENT & PLAN NOTE
Patient does appear to have some peripheral vascular disease issues, including some venous stasis changes on the lower extremities  There is also some edema  Check venous ultrasound extremity  Would like to rule out peripheral vascular disease as cause  Patient did have an ANA done in August of 2021, which was normal, and he has reasonable pulses  This suggest that he has vein cause of the problem, not artery  Patient can use compression socks, and can consider using diuretics

## 2022-06-03 DIAGNOSIS — I63.9 STROKE (HCC): ICD-10-CM

## 2022-06-07 ENCOUNTER — ANTICOAG VISIT (OUTPATIENT)
Dept: FAMILY MEDICINE CLINIC | Facility: CLINIC | Age: 62
End: 2022-06-07

## 2022-06-07 ENCOUNTER — APPOINTMENT (OUTPATIENT)
Dept: LAB | Facility: HOSPITAL | Age: 62
End: 2022-06-07
Payer: COMMERCIAL

## 2022-06-07 DIAGNOSIS — I48.0 PAROXYSMAL ATRIAL FIBRILLATION (HCC): Primary | ICD-10-CM

## 2022-06-07 DIAGNOSIS — I48.0 PAROXYSMAL ATRIAL FIBRILLATION (HCC): ICD-10-CM

## 2022-06-07 LAB
INR PPP: 2.55 (ref 0.84–1.19)
PROTHROMBIN TIME: 26.4 SECONDS (ref 11.6–14.5)

## 2022-06-07 PROCEDURE — 85610 PROTHROMBIN TIME: CPT

## 2022-06-07 PROCEDURE — 36415 COLL VENOUS BLD VENIPUNCTURE: CPT

## 2022-06-07 RX ORDER — WARFARIN SODIUM 5 MG/1
TABLET ORAL
Qty: 90 TABLET | Refills: 1 | Status: SHIPPED | OUTPATIENT
Start: 2022-06-07 | End: 2022-07-31

## 2022-06-16 ENCOUNTER — OFFICE VISIT (OUTPATIENT)
Dept: FAMILY MEDICINE CLINIC | Facility: CLINIC | Age: 62
End: 2022-06-16
Payer: COMMERCIAL

## 2022-06-16 VITALS
WEIGHT: 246 LBS | HEIGHT: 68 IN | SYSTOLIC BLOOD PRESSURE: 150 MMHG | DIASTOLIC BLOOD PRESSURE: 90 MMHG | HEART RATE: 52 BPM | BODY MASS INDEX: 37.28 KG/M2

## 2022-06-16 DIAGNOSIS — N20.0 KIDNEY STONES: ICD-10-CM

## 2022-06-16 DIAGNOSIS — N28.1 RENAL CYST: ICD-10-CM

## 2022-06-16 DIAGNOSIS — I10 PRIMARY HYPERTENSION: ICD-10-CM

## 2022-06-16 DIAGNOSIS — R60.0 EDEMA OF BOTH LOWER LEGS: ICD-10-CM

## 2022-06-16 DIAGNOSIS — I48.0 PAROXYSMAL ATRIAL FIBRILLATION (HCC): ICD-10-CM

## 2022-06-16 DIAGNOSIS — G89.29 CHRONIC RIGHT-SIDED LOW BACK PAIN WITHOUT SCIATICA: Primary | ICD-10-CM

## 2022-06-16 DIAGNOSIS — M54.50 CHRONIC RIGHT-SIDED LOW BACK PAIN WITHOUT SCIATICA: Primary | ICD-10-CM

## 2022-06-16 PROCEDURE — 99214 OFFICE O/P EST MOD 30 MIN: CPT | Performed by: FAMILY MEDICINE

## 2022-06-16 NOTE — PROGRESS NOTES
Assessment and Plan:    Problem List Items Addressed This Visit     Atrial fibrillation ([address] )     Stable at the moment  No changes  Continue with current treatments  Edema of both lower legs     Patient does have the ultrasound of the lower extremity scheduled  Likely is venous stasis  Will review after ultrasound completed  In the meantime, he can try Ace wraps  Has been on Lasix as well  Hypertension     Stable  No change  BP is a bit elevated vs before  Check with next OV  Kidney stones     No kidney stone noted on most recent ultrasound  Low back pain - Primary     Continues with back pain  Would recommend see PT for this  Relevant Orders    Ambulatory Referral to Physical Therapy    Renal cyst     Patient does have a renal cyst on the left, in fact 2  This is noted on ultrasound  Would recommend follow-up with Urology  This does not correlate with the pain that he was having on his right side  Diagnoses and all orders for this visit:    Chronic right-sided low back pain without sciatica  -     Ambulatory Referral to Physical Therapy; Future    Primary hypertension    Paroxysmal atrial fibrillation (HCC)    Renal cyst    Kidney stones    Edema of both lower legs            Subjective:      Patient ID: Mohinder Nicholas is a 58 y o  male  CC:    Chief Complaint   Patient presents with    Follow-up     Pt is present today for chronic condition follow up       HPI:    Patient has right flank pain continuing  Had [address], neg for stones  2 Cysts noted on left  Using Tylenol for pain at HS  Notes more pain with pushing on the side  Has been since December  Patient is also still concerned about his lower extremities, as well as the discoloration  The venous reflux study is being done in July, so was changed reschedule for then    He previously had ANA, which showed normal ankle brachial index, and therefore not arterial cause for his symptoms  Patient asked about "Cancer "  He wondered about thyroid cancer, and kidney cancer  No findings on US for kidney, and thyroid should not cause back pains  Also, Diabetes would not cause back pain (He asked)  The following portions of the patient's history were reviewed and updated as appropriate: allergies, current medications, past family history, past medical history, past social history, past surgical history and problem list       Review of Systems   Constitutional: Negative  HENT: Negative  Respiratory: Negative  Cardiovascular: Positive for leg swelling  Gastrointestinal: Negative  Musculoskeletal: Positive for back pain  Skin: Positive for rash  Data to review:       Objective:    Vitals:    06/16/22 0901   BP: 150/90   BP Location: Right arm   Patient Position: Sitting   Cuff Size: Standard   Pulse: (!) 52   Weight: 112 kg (246 lb)   Height: 5' 8" (1 727 m)        Physical Exam  Vitals and nursing note reviewed  Constitutional:       Appearance: Normal appearance  Neck:      Vascular: No carotid bruit  Cardiovascular:      Rate and Rhythm: Normal rate and regular rhythm  Pulses: Normal pulses  Carotid pulses are 2+ on the right side and 2+ on the left side  Heart sounds: Murmur heard  Systolic murmur is present with a grade of 2/6  No gallop  Comments: Bruits in Carotid, but are referred murmur  Pulmonary:      Effort: Pulmonary effort is normal  No respiratory distress  Breath sounds: Normal breath sounds  No stridor  No wheezing, rhonchi or rales  Chest:      Chest wall: No tenderness  Neurological:      Mental Status: He is alert

## 2022-06-16 NOTE — ASSESSMENT & PLAN NOTE
Patient does have a renal cyst on the left, in fact 2  This is noted on ultrasound  Would recommend follow-up with Urology  This does not correlate with the pain that he was having on his right side

## 2022-06-16 NOTE — ASSESSMENT & PLAN NOTE
Patient does have the ultrasound of the lower extremity scheduled  Likely is venous stasis  Will review after ultrasound completed  In the meantime, he can try Ace wraps  Has been on Lasix as well

## 2022-06-16 NOTE — PATIENT INSTRUCTIONS
Problem List Items Addressed This Visit       Atrial fibrillation ([address] )     Stable at the moment  No changes  Continue with current treatments  Edema of both lower legs     Patient does have the ultrasound of the lower extremity scheduled  Likely is venous stasis  Will review after ultrasound completed  In the meantime, he can try Ace wraps  Has been on Lasix as well  Hypertension     Stable  No change  BP is a bit elevated vs before  Check with next OV  Kidney stones     No kidney stone noted on most recent ultrasound  Low back pain - Primary     Continues with back pain  Would recommend see PT for this  Relevant Orders    Ambulatory Referral to Physical Therapy    Renal cyst     Patient does have a renal cyst on the left, in fact 2  This is noted on ultrasound  Would recommend follow-up with Urology  This does not correlate with the pain that he was having on his right side  COVID 19 Instructions    Hans Miranda was advised to limit contact with others to essential tasks such as getting food, medications, and medical care  Proper handwashing reviewed, and Hand sanitzer when washing is not available  If the patient develops symptoms of COVID 19, the patient should call the office as soon as possible  For 3081-8172 Flu season, it is strongly recommended that Flu Vaccinations be obtained  Virtual Visits:  Cr: This works on smart phones (any phone with Internet browsing capability)  You should get a text message when the provider is ready to see you  Click on the link in the text message, and the call should start  You will need to type in your name, and allow camera and microphone access  This is HIPPA compliant, and secure  If you have not already done so, get immunized to COVID 19        We are committed to getting you vaccinated as soon as possible and will be closely following CDC and [facility]  guidelines as they are released and revised  Please refer to our COVID-19 vaccine webpage for the most up to date information on the vaccine and our distribution efforts  This site will also have the most up to date recommendations for COVID booster vaccine  Jose gallegos    Call [phone]), option [phone]    OUR NEW LOCATION:    [address], [address] [place] [address], [address]  Fax: [phone]    Lab services and OB/GYN are at this location as well

## 2022-06-17 ENCOUNTER — REMOTE DEVICE CLINIC VISIT (OUTPATIENT)
Dept: CARDIOLOGY CLINIC | Facility: CLINIC | Age: 62
End: 2022-06-17
Payer: COMMERCIAL

## 2022-06-17 DIAGNOSIS — Z95.818 PRESENCE OF OTHER CARDIAC IMPLANTS AND GRAFTS: Primary | ICD-10-CM

## 2022-06-17 PROCEDURE — 93298 REM INTERROG DEV EVAL SCRMS: CPT | Performed by: INTERNAL MEDICINE

## 2022-06-17 PROCEDURE — G2066 INTER DEVC REMOTE 30D: HCPCS | Performed by: INTERNAL MEDICINE

## 2022-06-17 NOTE — PROGRESS NOTES
CARELINK TRANSMISSION: LOOP RECORDER  PRESENTING RHYTHM NSR W/ PVC @ 61 BPM  BATTERY STATUS "OK " 43 DEVICE CLASSIFIED AF EPISODES, AVAILABLE STRIPS DEMONSTRATE NO ATRIAL FIBRILLATION  INSTEAD EGM'S SHOW SR W/ PACS, PVCS AND OVERSENSING  AF BURDEN IS 0 2%  AF BURDEN MAYBE OVERESTIMATED DUE TO INAPPROPRIATE CLASSIFICATION OF AF  SOME STRIPS MAY NOT BE INTERPRETABLE OR AVAILABLE, CANNOT DEFINITIVELY RULE OUT ATRIAL FIBRILLATION  HX OF PAF  PT TAKES COUMADIN, DILTIAZEM, ASA 81 AND METOPROLOL SUCC  NO PATIENT ACTIVATED EPISODES  NORMAL DEVICE FUNCTION   DL

## 2022-06-30 ENCOUNTER — OFFICE VISIT (OUTPATIENT)
Dept: FAMILY MEDICINE CLINIC | Facility: CLINIC | Age: 62
End: 2022-06-30
Payer: COMMERCIAL

## 2022-06-30 VITALS
WEIGHT: 244 LBS | DIASTOLIC BLOOD PRESSURE: 68 MMHG | SYSTOLIC BLOOD PRESSURE: 142 MMHG | HEART RATE: 52 BPM | HEIGHT: 68 IN | BODY MASS INDEX: 36.98 KG/M2 | OXYGEN SATURATION: 97 %

## 2022-06-30 DIAGNOSIS — I10 ESSENTIAL HYPERTENSION: Chronic | ICD-10-CM

## 2022-06-30 DIAGNOSIS — L23.7 ALLERGIC CONTACT DERMATITIS DUE TO PLANTS, EXCEPT FOOD: Primary | ICD-10-CM

## 2022-06-30 DIAGNOSIS — E78.5 HYPERLIPIDEMIA: Chronic | ICD-10-CM

## 2022-06-30 DIAGNOSIS — R29.898 WEAKNESS OF BOTH LOWER EXTREMITIES: ICD-10-CM

## 2022-06-30 PROCEDURE — 3077F SYST BP >= 140 MM HG: CPT | Performed by: FAMILY MEDICINE

## 2022-06-30 PROCEDURE — 3078F DIAST BP <80 MM HG: CPT | Performed by: FAMILY MEDICINE

## 2022-06-30 PROCEDURE — 3008F BODY MASS INDEX DOCD: CPT | Performed by: FAMILY MEDICINE

## 2022-06-30 PROCEDURE — 99213 OFFICE O/P EST LOW 20 MIN: CPT | Performed by: FAMILY MEDICINE

## 2022-06-30 PROCEDURE — 1036F TOBACCO NON-USER: CPT | Performed by: FAMILY MEDICINE

## 2022-06-30 RX ORDER — PREDNISONE 10 MG/1
TABLET ORAL
Qty: 50 TABLET | Refills: 0 | Status: SHIPPED | OUTPATIENT
Start: 2022-06-30 | End: 2022-07-28 | Stop reason: ALTCHOICE

## 2022-06-30 RX ORDER — METOPROLOL SUCCINATE 50 MG/1
150 TABLET, EXTENDED RELEASE ORAL DAILY
Qty: 270 TABLET | Refills: 1 | Status: SHIPPED | OUTPATIENT
Start: 2022-06-30 | End: 2022-07-31

## 2022-06-30 RX ORDER — ATORVASTATIN CALCIUM 40 MG/1
40 TABLET, FILM COATED ORAL
Qty: 90 TABLET | Refills: 1 | Status: SHIPPED | OUTPATIENT
Start: 2022-06-30

## 2022-06-30 RX ORDER — MOMETASONE FUROATE 1 MG/G
CREAM TOPICAL DAILY
Qty: 45 G | Refills: 0 | Status: SHIPPED | OUTPATIENT
Start: 2022-06-30 | End: 2022-08-02

## 2022-06-30 NOTE — PROGRESS NOTES
Assessment and Plan:    Problem List Items Addressed This Visit     Hyperlipidemia (Chronic)    Relevant Medications    atorvastatin (LIPITOR) 40 mg tablet    Weakness of both lower extremities     Right leg not working well earlier today  Is OK now  Recommend call neuro to review about possible TIA  If determined to NOT be TIA, would recommend PT for ROM and strength and balance  Other Visit Diagnoses     Allergic contact dermatitis due to plants, except food    -  Primary    Recommend steroids  Hesitant multiple areas  Topical steroids for severe itch  Relevant Medications    predniSONE 10 mg tablet    mometasone (ELOCON) 0 1 % cream    Essential hypertension  (Chronic)       Relevant Medications    metoprolol succinate (TOPROL-XL) 50 mg 24 hr tablet                 Diagnoses and all orders for this visit:    Allergic contact dermatitis due to plants, except food  Comments:  Recommend steroids  Hesitant multiple areas  Topical steroids for severe itch  Orders:  -     predniSONE 10 mg tablet; Take 3 BID for 3 days then 2 BID for 3 days then 1 BID for 3 days then 1 QD for 3 days then 1/2 QD for 3 days then stop  -     mometasone (ELOCON) 0 1 % cream; Apply topically daily    Hyperlipidemia  -     atorvastatin (LIPITOR) 40 mg tablet; Take 1 tablet (40 mg total) by mouth daily with dinner    Essential hypertension  -     metoprolol succinate (TOPROL-XL) 50 mg 24 hr tablet; Take 3 tablets (150 mg total) by mouth daily    Weakness of both lower extremities            Subjective:      Patient ID: Joanna Clay is a 58 y o  male  CC:    Chief Complaint   Patient presents with    Rash     Patient present today for a rash on both of his arms, below his eyes, on his neck and a couple spots on his buttocks  Per patient the rash started about 2-3 days ago but started to get bumps about 2 weeks ago    Patient mentioned she has been working in the garden and also stated he started taking furosemide about weeks ago as well  HPI:    Patient has an acute rash  Started on the right arm  It is on the forearm, as well as on the left forearm to a certain extent  Significant itch  Feels like he has to scratch quite frequently  Minimal discharge in 1 area  He did mention that he was working in the garden recently  He also mentioned that his right leg felt off while working in the yard today  He was concerned about this being a stroke  He is on Warfarin  This did resolve  He does have Neuro he can see  Would recommend he speak with them about that  The following portions of the patient's history were reviewed and updated as appropriate: allergies, current medications, past family history, past medical history, past social history, past surgical history and problem list       Review of Systems   Constitutional: Negative  HENT: Negative  Respiratory: Negative  Skin: Positive for rash  Data to review:       Objective:    Vitals:    06/30/22 1559   BP: 142/68   BP Location: Left arm   Patient Position: Sitting   Cuff Size: Large   Pulse: (!) 52   SpO2: 97%   Weight: 111 kg (244 lb)   Height: 5' 8" (1 727 m)        Physical Exam  Vitals and nursing note reviewed  Skin:            Comments: Rash stops where his clothing starts

## 2022-06-30 NOTE — ASSESSMENT & PLAN NOTE
Right leg not working well earlier today  Is OK now  Recommend call neuro to review about possible TIA  If determined to NOT be TIA, would recommend PT for ROM and strength and balance

## 2022-06-30 NOTE — PATIENT INSTRUCTIONS
Problem List Items Addressed This Visit       Hyperlipidemia (Chronic)    Relevant Medications    atorvastatin (LIPITOR) 40 mg tablet    Weakness of both lower extremities     Right leg not working well earlier today  Is OK now  Recommend call neuro to review about possible TIA  If determined to NOT be TIA, would recommend PT for ROM and strength and balance  Other Visit Diagnoses       Allergic contact dermatitis due to plants, except food    -  Primary    Recommend steroids  Hesitant multiple areas  Topical steroids for severe itch  Relevant Medications    predniSONE 10 mg tablet    mometasone (ELOCON) 0 1 % cream    Essential hypertension  (Chronic)       Relevant Medications    metoprolol succinate (TOPROL-XL) 50 mg 24 hr tablet            COVID 19 Instructions    Sadi Goss was advised to limit contact with others to essential tasks such as getting food, medications, and medical care  Proper handwashing reviewed, and Hand sanitzer when washing is not available  If the patient develops symptoms of COVID 19, the patient should call the office as soon as possible  For 5632-4272 Flu season, it is strongly recommended that Flu Vaccinations be obtained  Virtual Visits:  Cr: This works on smart phones (any phone with Internet browsing capability)  You should get a text message when the provider is ready to see you  Click on the link in the text message, and the call should start  You will need to type in your name, and allow camera and microphone access  This is HIPPA compliant, and secure  If you have not already done so, get immunized to COVID 19  We are committed to getting you vaccinated as soon as possible and will be closely following CDC and [facility]  guidelines as they are released and revised  Please refer to our COVID-19 vaccine webpage for the most up to date information on the vaccine and our distribution efforts      This site will also have the most up to date recommendations for COVID booster vaccine  Jose gallegos    Call [phone] ([phone]), option [phone]    OUR NEW LOCATION:    [address], [facility] [address], [place], [address]  Fax: [phone]    Lab services and OB/GYN are at this location as well

## 2022-07-05 NOTE — PROGRESS NOTES
[facility]  Progress Note - Raissa Lucero 1960, 64 y o  male MRN: [id number]  Unit/Bed#: [facility] 707-01 Encounter: [id number]  Primary Care Provider: Felipe Wilson MD   Date and time admitted to hospital: 5/1/2021  3:53 PM    * Acute CVA (cerebrovascular accident) [facility])  Assessment & Plan  Known hx of strokes in past  On Eliquis after Xarelto failure  Had outpatient MRI Brain showing acute and subacute areas of stroke  · Appreciate neurology input  · CTA head and neck pending  · For NGUYỄN, NPO [place]  · Thrombosis panel pending per neuro though already has likely antiphospholipid syndrome per outpatient hematology   · Recommend blood pressure goals SBP is (140-180)  · Already has loop recorder in place  · continue lipitor, asa and eliquis for now  · PT/OT/ST    History of stroke  Assessment & Plan  Noted  · For additional w/u as above     [facility] )  Assessment & Plan  Continue metoprolol for rate control  · Continue Eliquis for anticoagulation for now    H/O aortic aneurysm repair  Assessment & Plan  Repaired in 2014 with possible postprocedural history of AFib  · Patient has failed Xarelto in 2020, and was placed on Eliquis 5 mg b i d   · He does follow-up with outpatient Cardiology    CORIE (obstructive sleep apnea)  Assessment & Plan  · Patient does not wish for CPAP    Antiphospholipid antibody with hypercoagulable state ([facility] )  Assessment & Plan  Noted  · Hematology was consulted,f/u recs  · Known to them as outpatient    Obesity  Assessment & Plan  Body mass index is 39 53 kg/m²  · Recommend weight loss    Hyperlipidemia  Assessment & Plan  · Continue statin    GERD (gastroesophageal reflux disease)  Assessment & Plan  · Continue Protonix    Hypertension  Assessment & Plan  · Continue metoprolol and Norvasc        VTE Pharmacologic Prophylaxis:   Moderate Risk (Score 3-4) - Pharmacological DVT Prophylaxis Ordered: apixaban (Eliquis)      Patient Centered Okay to discharge per Kerry Lr, clinic manager.    Letter sent, patient modifiers set. Please complete discharge summary.    Routed also to patient's PCP Ibrahima Titus MD as FYI   Rounds: I performed bedside rounds with nursing staff today  Discussions with Specialists or Other Care Team Provider: appreciate neurology input  Education and Discussions with Family / Patient: Patient declined call to   Time Spent for Care: 30 minutes  More than 50% of total time spent on counseling and coordination of care as described above  Current Length of Stay: 1 day(s)  Current Patient Status: Inpatient   Certification Statement: The patient will continue to require additional inpatient hospital stay due to additional CVA w/u   Discharge Plan: Anticipate discharge in 48-72 hrs to discharge location to be determined pending rehab evaluations  Code Status: Level 1 - Full Code    Subjective:   Doing ok  No acute complaints  Has 1 month of a blurry spot in vision and chronic abdominal bloating  Otherwise no new complaints  Objective:     Vitals:   Temp (24hrs), Av 3 °F (36 8 °C), Min:97 9 °F (36 6 °C), Max:99 °F (37 2 °C)    Temp:  [97 9 °F (36 6 °C)-99 °F (37 2 °C)] 98 3 °F (36 8 °C)  HR:  [53-71] 71  Resp:  [16] 16  BP: (122-153)/(69-89) 142/89  SpO2:  [93 %-98 %] 93 %  Body mass index is 39 53 kg/m²  Input and Output Summary (last 24 hours):   No intake or output data in the 24 hours ending 21 1204    Physical Exam:   Physical Exam  Vitals signs and nursing note reviewed  Constitutional:       Appearance: He is obese  Cardiovascular:      Rate and Rhythm: Normal rate and regular rhythm  Abdominal:      General: Bowel sounds are normal  There is distension  Tenderness: There is no abdominal tenderness  Musculoskeletal:      Right lower leg: No edema  Left lower leg: No edema  Neurological:      Mental Status: He is oriented to person, place, and time     Psychiatric:      Comments: Very flat           Additional Data:     Labs:  Results from last 7 days   Lab Units 21  0558 21  1638   WBC Thousand/uL 8 34 9 68   HEMOGLOBIN g/dL 14 4 14  7   HEMATOCRIT % 42 3 43 0   PLATELETS Thousands/uL 171 195   NEUTROS PCT %  --  72   LYMPHS PCT %  --  17   MONOS PCT %  --  7   EOS PCT %  --  3     Results from last 7 days   Lab Units 05/02/21  0558   SODIUM mmol/L 139   POTASSIUM mmol/L 3 7   CHLORIDE mmol/L 107   CO2 mmol/L 28   BUN mg/dL 12   CREATININE mg/dL 1 08   ANION GAP mmol/L 4   CALCIUM mg/dL 8 8   ALBUMIN g/dL 3 4*   TOTAL BILIRUBIN mg/dL 1 85*   ALK PHOS U/L 145*   ALT U/L 44   AST U/L 21   GLUCOSE RANDOM mg/dL 110     Results from last 7 days   Lab Units 05/01/21  1638   INR  1 07         Results from last 7 days   Lab Units 05/02/21  0558   HEMOGLOBIN A1C % 5 3           Lines/Drains:  Invasive Devices     Peripheral Intravenous Line            Peripheral IV 05/01/21 Right;Ventral (anterior) Antecubital less than 1 day                  Telemetry:  Telemetry Orders (From admission, onward)             48 Hour Telemetry Monitoring  Continuous x 48 hours     Question:  Reason for 48 Hour Telemetry  Answer:  Acute CVA (<24 hrs old, hemispheric strokes, selected brainstem strokes, cardiac arrhythmias)                 Telemetry Reviewed: Normal Sinus Rhythm  Indication for Continued Telemetry Use: Acute CVA           Imaging: Reviewed radiology reports from this admission including: MRI brain    Recent Cultures (last 7 days):   Results from last 7 days   Lab Units 04/27/21  0908   URINE CULTURE  <10,000 cfu/ml        Last 24 Hours Medication List:   Current Facility-Administered Medications   Medication Dose Route Frequency Provider Last Rate    amLODIPine  5 mg Oral Daily Eleonora Flores MD      apixaban  5 mg Oral BID Eleonora Flores MD      aspirin  81 mg Oral Daily Eleonora Flores MD      atorvastatin  40 mg Oral Daily With Mary Martin MD      metoprolol succinate  150 mg Oral Daily Eleonora Flores MD      pantoprazole  40 mg Oral Early Morning Eleonora Flores MD          Today, Patient Was Seen By: Britt Díaz ALISIA    **Please Note: This note may have been constructed using a voice recognition system  **

## 2022-07-11 ENCOUNTER — HOSPITAL ENCOUNTER (OUTPATIENT)
Dept: NON INVASIVE DIAGNOSTICS | Facility: CLINIC | Age: 62
Discharge: HOME | End: 2022-07-11
Payer: COMMERCIAL

## 2022-07-11 ENCOUNTER — APPOINTMENT (OUTPATIENT)
Dept: LAB | Facility: HOSPITAL | Age: 62
End: 2022-07-11
Payer: COMMERCIAL

## 2022-07-11 ENCOUNTER — ANTICOAG VISIT (OUTPATIENT)
Dept: FAMILY MEDICINE CLINIC | Facility: CLINIC | Age: 62
End: 2022-07-11

## 2022-07-11 ENCOUNTER — RA CDI HCC (OUTPATIENT)
Dept: OTHER | Facility: HOSPITAL | Age: 62
End: 2022-07-11

## 2022-07-11 DIAGNOSIS — I73.9 PVD (PERIPHERAL VASCULAR DISEASE) (HCC): ICD-10-CM

## 2022-07-11 DIAGNOSIS — I48.0 PAROXYSMAL ATRIAL FIBRILLATION (HCC): Primary | ICD-10-CM

## 2022-07-11 LAB
INR PPP: 3.38 (ref 0.84–1.19)
PROTHROMBIN TIME: 32.7 SECONDS (ref 11.6–14.5)

## 2022-07-11 PROCEDURE — 85610 PROTHROMBIN TIME: CPT

## 2022-07-11 PROCEDURE — 93970 EXTREMITY STUDY: CPT | Performed by: SURGERY

## 2022-07-11 PROCEDURE — 93970 EXTREMITY STUDY: CPT

## 2022-07-11 PROCEDURE — 36415 COLL VENOUS BLD VENIPUNCTURE: CPT

## 2022-07-11 NOTE — PROGRESS NOTES
[facility]  coding opportunities       Chart reviewed, no opportunity found: CHART REVIEWED, NO OPPORTUNITY FOUND        Patients Insurance     Medicare Insurance: [employer] [facility] Advantage

## 2022-07-18 ENCOUNTER — APPOINTMENT (OUTPATIENT)
Dept: LAB | Facility: HOSPITAL | Age: 62
End: 2022-07-18
Payer: COMMERCIAL

## 2022-07-18 ENCOUNTER — ANTICOAG VISIT (OUTPATIENT)
Dept: FAMILY MEDICINE CLINIC | Facility: CLINIC | Age: 62
End: 2022-07-18

## 2022-07-19 ENCOUNTER — OFFICE VISIT (OUTPATIENT)
Dept: FAMILY MEDICINE CLINIC | Facility: CLINIC | Age: 62
End: 2022-07-19
Payer: COMMERCIAL

## 2022-07-19 VITALS
SYSTOLIC BLOOD PRESSURE: 126 MMHG | HEIGHT: 68 IN | HEART RATE: 60 BPM | WEIGHT: 242 LBS | BODY MASS INDEX: 36.68 KG/M2 | DIASTOLIC BLOOD PRESSURE: 70 MMHG

## 2022-07-19 DIAGNOSIS — I48.0 PAROXYSMAL ATRIAL FIBRILLATION (HCC): ICD-10-CM

## 2022-07-19 DIAGNOSIS — I73.9 PVD (PERIPHERAL VASCULAR DISEASE) (HCC): Primary | ICD-10-CM

## 2022-07-19 DIAGNOSIS — I10 PRIMARY HYPERTENSION: ICD-10-CM

## 2022-07-19 DIAGNOSIS — D68.61 ANTIPHOSPHOLIPID ANTIBODY WITH HYPERCOAGULABLE STATE (HCC): ICD-10-CM

## 2022-07-19 DIAGNOSIS — I63.9 CEREBROVASCULAR ACCIDENT (CVA), UNSPECIFIED MECHANISM (HCC): ICD-10-CM

## 2022-07-19 PROCEDURE — 99214 OFFICE O/P EST MOD 30 MIN: CPT | Performed by: FAMILY MEDICINE

## 2022-07-19 NOTE — LETTER
July 19, 2022     Natalee Lewis MD  [address]  [address] [place] [address]    Patient: Anali Urbina   YOB: 1960   Date of Visit: 7/19/2022       Dear Dr Jose Villaseñor: Thank you for referring Jimi Ramsey to me for evaluation  Below are my notes for this consultation  Patient reports that he has been having some issues with fuzziness    There did not seem to be anything specific on his exam today  I recommended that he call your office to re-evaluate  Will also enter referral   He certainly could see 1 of your physician assistants or nurse practitioners, rather than you directly if timing is an issue  If you have questions, please do not hesitate to call me  I look forward to following your patient along with you  Sincerely,        Galilea Shelton MD        CC: No Recipients  Galilea Shelton MD  7/19/2022  1:46 PM  Incomplete  Assessment and Plan:    Problem List Items Addressed This Visit    None                There are no diagnoses linked to this encounter  Subjective:      Patient ID: Anali Urbina is a 58 y o  male  CC:    Chief Complaint   Patient presents with    Follow-up     Patient is here to f/u on his Venous Duplex bilateral legs done last week  ak       HPI:    Patient is here to follow-up on several issues  With regard to peripheral artery disease and peripheral vein disease, he previously had ABIs which were negative  Reviewed the venous ultrasound, which did show some issues with the right lower extremity  Left lower extremity did not show significant venous incompetence  Patient has been feeling somewhat fuzzy in the head    He wanted to talk to Neurology, but reports that he was transferred to their nurse, and did not have any answer from that office  Atrial fibrillation:  Currently on warfarin  Did have some adjustments recently  Hyper coag:  Again, on warfarin and had some adjustments recently            The following portions of the patient's history were reviewed and updated as appropriate: allergies, current medications, past family history, past medical history, past social history, past surgical history and problem list       Review of Systems      Data to review:       Objective:    Vitals:    07/19/22 1313   BP: 126/70   Pulse: 60   Weight: 110 kg (242 lb)   Height: 5' 8" (1 727 m)        Physical Exam

## 2022-07-19 NOTE — ASSESSMENT & PLAN NOTE
Patient did have CVA previously  Has followed with Neurology  He is feeling somewhat more fuzzy    More specifically, he thinks that his thinking processes are slightly off, and he mentioned that he did call Neurology, but did not hear back from them yet  I will send a copy of today's note and see if they can address the issues

## 2022-07-19 NOTE — PROGRESS NOTES
Assessment and Plan:    Problem List Items Addressed This Visit     Antiphospholipid antibody with hypercoagulable state ([facility] )     On warfarin  Has had some adjustment done recently  Atrial fibrillation ([facility] )     Continues on warfarin  CVA (cere[facility] vascular accident[facility])     Patient did have CVA previously  Has followed with Neurology  He is feeling somewhat more fuzzy    More specifically, he thinks that his thinking processes are slightly off, and he mentioned that he did call Neurology, but did not hear back from them yet  I will send a copy of today's note and see if they can address the issues  Relevant Orders    Ambulatory Referral to Neurology    Hypertension     Blood pressure doing well  No change  PVD (peripheral vascular disease) ([facility] ) - Primary     Patient did have venous ultrasound, which was positive on the right for 1 vein, but the remainder were reasonable  Nothing specific on the left  Previous ANA was done, and was normal   Based on that, the patient can use compression socks if he wished  He could also use diuretics on occasion  Relevant Orders    TEDS Stockings                 Diagnoses and all orders for this visit:    PVD (peripheral vascular disease) ([facility] )  -     TEDS Stockings    Antiphospholipid antibody with hypercoagulable state ([facility] )    Paroxysmal atrial fibrillation ([facility] )    Primary hypertension    Cerebrovascular accident (CVA), unspecified mechanism ([facility] )  -     Ambulatory Referral to Neurology; Future            Subjective:      Patient ID: Mildred Carlson is a 58 y o  male  CC:    Chief Complaint   Patient presents with    Follow-up     Patient is here to f/u on his Venous Duplex bilateral legs done last week  ak       HPI:    Patient is here to follow-up on several issues  With regard to peripheral artery disease and peripheral vein disease, he previously had ABIs which were negative    Reviewed the venous ultrasound, which did show some issues with the right lower extremity  Left lower extremity did not show significant venous incompetence  Patient has been feeling somewhat fuzzy in the head    He wanted to talk to Neurology, but reports that he was transferred to their nurse, and did not have any answer from that office  Atrial fibrillation:  Currently on warfarin  Did have some adjustments recently  Hyper coag:  Again, on warfarin and had some adjustments recently  The following portions of the patient's history were reviewed and updated as appropriate: allergies, current medications, past family history, past medical history, past social history, past surgical history and problem list       Review of Systems   Constitutional: Negative  HENT: Negative  Eyes: Negative  Respiratory: Negative  Cardiovascular: Negative  Gastrointestinal: Negative  Endocrine: Negative  Genitourinary: Negative  Musculoskeletal: Negative  Skin: Negative  Allergic/Immunologic: Negative  Neurological: Negative  Hematological: Negative  Psychiatric/Behavioral: Negative  Data to review:       Objective:    Vitals:    07/19/22 1313   BP: 126/70   Pulse: 60   Weight: 110 kg (242 lb)   Height: 5' 8" (1 727 m)        Physical Exam  Vitals and nursing note reviewed  Constitutional:       Appearance: Normal appearance  Neck:      Vascular: No carotid bruit  Cardiovascular:      Rate and Rhythm: Normal rate and regular rhythm  Pulses: Normal pulses  Carotid pulses are 2+ on the right side and 2+ on the left side  Heart sounds: Normal heart sounds  No murmur heard  No gallop  Pulmonary:      Effort: Pulmonary effort is normal  No respiratory distress  Breath sounds: Normal breath sounds  No stridor  No wheezing, rhonchi or rales  Chest:      Chest wall: No tenderness  Neurological:      Mental Status: He is alert

## 2022-07-19 NOTE — ASSESSMENT & PLAN NOTE
Patient did have venous ultrasound, which was positive on the right for 1 vein, but the remainder were reasonable  Nothing specific on the left  Previous ANA was done, and was normal   Based on that, the patient can use compression socks if he wished  He could also use diuretics on occasion

## 2022-07-28 ENCOUNTER — OFFICE VISIT (OUTPATIENT)
Dept: FAMILY MEDICINE CLINIC | Facility: CLINIC | Age: 62
End: 2022-07-28
Payer: COMMERCIAL

## 2022-07-28 ENCOUNTER — APPOINTMENT (EMERGENCY)
Dept: RADIOLOGY | Facility: HOSPITAL | Age: 62
DRG: 313 | End: 2022-07-28
Payer: COMMERCIAL

## 2022-07-28 ENCOUNTER — HOSPITAL ENCOUNTER (EMERGENCY)
Facility: HOSPITAL | Age: 62
Discharge: HOME | DRG: 313 | End: 2022-07-28
Attending: EMERGENCY MEDICINE
Payer: COMMERCIAL

## 2022-07-28 VITALS
SYSTOLIC BLOOD PRESSURE: 128 MMHG | DIASTOLIC BLOOD PRESSURE: 80 MMHG | BODY MASS INDEX: 36.07 KG/M2 | HEIGHT: 68 IN | WEIGHT: 238 LBS | HEART RATE: 60 BPM

## 2022-07-28 VITALS
RESPIRATION RATE: 18 BRPM | HEART RATE: 53 BPM | DIASTOLIC BLOOD PRESSURE: 87 MMHG | SYSTOLIC BLOOD PRESSURE: 184 MMHG | WEIGHT: 238 LBS | OXYGEN SATURATION: 96 % | BODY MASS INDEX: 36.19 KG/M2

## 2022-07-28 DIAGNOSIS — I10 PRIMARY HYPERTENSION: ICD-10-CM

## 2022-07-28 DIAGNOSIS — K57.90 DIVERTICULOSIS: ICD-10-CM

## 2022-07-28 DIAGNOSIS — K62.5 BRBPR (BRIGHT RED BLOOD PER RECTUM): Primary | ICD-10-CM

## 2022-07-28 DIAGNOSIS — K31.7 GASTRIC POLYPS: ICD-10-CM

## 2022-07-28 DIAGNOSIS — K63.5 POLYP OF COLON, UNSPECIFIED PART OF COLON, UNSPECIFIED TYPE: ICD-10-CM

## 2022-07-28 DIAGNOSIS — K31.819 GASTRIC AVM: ICD-10-CM

## 2022-07-28 DIAGNOSIS — I48.0 PAROXYSMAL ATRIAL FIBRILLATION (HCC): ICD-10-CM

## 2022-07-28 DIAGNOSIS — Z79.01 ANTICOAGULATED ON COUMADIN: ICD-10-CM

## 2022-07-28 DIAGNOSIS — K62.5 RECTAL BLEED: ICD-10-CM

## 2022-07-28 DIAGNOSIS — R10.31 RIGHT LOWER QUADRANT ABDOMINAL PAIN: Primary | ICD-10-CM

## 2022-07-28 LAB
ABO GROUP BLD: NORMAL
ALBUMIN SERPL BCP-MCNC: 3.3 G/DL (ref 3.5–5)
ALP SERPL-CCNC: 125 U/L (ref 46–116)
ALT SERPL W P-5'-P-CCNC: 30 U/L (ref 12–78)
ANION GAP SERPL CALCULATED.3IONS-SCNC: 4 MMOL/L (ref 4–13)
APTT PPP: 36 SECONDS (ref 23–37)
AST SERPL W P-5'-P-CCNC: 19 U/L (ref 5–45)
BASOPHILS # BLD AUTO: 0.01 THOUSANDS/ΜL (ref 0–0.1)
BASOPHILS NFR BLD AUTO: 0 % (ref 0–1)
BILIRUB SERPL-MCNC: 1.55 MG/DL (ref 0.2–1)
BLD GP AB SCN SERPL QL: NEGATIVE
BUN SERPL-MCNC: 10 MG/DL (ref 5–25)
CALCIUM ALBUM COR SERPL-MCNC: 9.6 MG/DL (ref 8.3–10.1)
CALCIUM SERPL-MCNC: 9 MG/DL (ref 8.3–10.1)
CHLORIDE SERPL-SCNC: 109 MMOL/L (ref 96–108)
CO2 SERPL-SCNC: 27 MMOL/L (ref 21–32)
CREAT SERPL-MCNC: 1.04 MG/DL (ref 0.6–1.3)
EOSINOPHIL # BLD AUTO: 0.17 THOUSAND/ΜL (ref 0–0.61)
EOSINOPHIL NFR BLD AUTO: 3 % (ref 0–6)
ERYTHROCYTE [DISTWIDTH] IN BLOOD BY AUTOMATED COUNT: 13.8 % (ref 11.6–15.1)
GFR SERPL CREATININE-BSD FRML MDRD: 76 ML/MIN/1.73SQ M
GLUCOSE SERPL-MCNC: 98 MG/DL (ref 65–140)
HCT VFR BLD AUTO: 41.5 % (ref 36.5–49.3)
HGB BLD-MCNC: 13.7 G/DL (ref 12–17)
IMM GRANULOCYTES # BLD AUTO: 0.01 THOUSAND/UL (ref 0–0.2)
IMM GRANULOCYTES NFR BLD AUTO: 0 % (ref 0–2)
INR PPP: 2.51 (ref 0.84–1.19)
LIPASE SERPL-CCNC: 100 U/L (ref 73–393)
LYMPHOCYTES # BLD AUTO: 1.26 THOUSANDS/ΜL (ref 0.6–4.47)
LYMPHOCYTES NFR BLD AUTO: 21 % (ref 14–44)
MCH RBC QN AUTO: 29 PG (ref 26.8–34.3)
MCHC RBC AUTO-ENTMCNC: 33 G/DL (ref 31.4–37.4)
MCV RBC AUTO: 88 FL (ref 82–98)
MONOCYTES # BLD AUTO: 0.54 THOUSAND/ΜL (ref 0.17–1.22)
MONOCYTES NFR BLD AUTO: 9 % (ref 4–12)
NEUTROPHILS # BLD AUTO: 3.97 THOUSANDS/ΜL (ref 1.85–7.62)
NEUTS SEG NFR BLD AUTO: 67 % (ref 43–75)
NRBC BLD AUTO-RTO: 0 /100 WBCS
PLATELET # BLD AUTO: 166 THOUSANDS/UL (ref 149–390)
PMV BLD AUTO: 9.8 FL (ref 8.9–12.7)
POTASSIUM SERPL-SCNC: 3.3 MMOL/L (ref 3.5–5.3)
PROT SERPL-MCNC: 6.4 G/DL (ref 6.4–8.4)
PROTHROMBIN TIME: 27.4 SECONDS (ref 11.6–14.5)
RBC # BLD AUTO: 4.73 MILLION/UL (ref 3.88–5.62)
RH BLD: POSITIVE
SODIUM SERPL-SCNC: 140 MMOL/L (ref 135–147)
SPECIMEN EXPIRATION DATE: NORMAL
WBC # BLD AUTO: 5.96 THOUSAND/UL (ref 4.31–10.16)

## 2022-07-28 PROCEDURE — 82272 OCCULT BLD FECES 1-3 TESTS: CPT

## 2022-07-28 PROCEDURE — 80053 COMPREHEN METABOLIC PANEL: CPT | Performed by: EMERGENCY MEDICINE

## 2022-07-28 PROCEDURE — 85610 PROTHROMBIN TIME: CPT | Performed by: EMERGENCY MEDICINE

## 2022-07-28 PROCEDURE — 86850 RBC ANTIBODY SCREEN: CPT | Performed by: EMERGENCY MEDICINE

## 2022-07-28 PROCEDURE — 36415 COLL VENOUS BLD VENIPUNCTURE: CPT | Performed by: EMERGENCY MEDICINE

## 2022-07-28 PROCEDURE — 99214 OFFICE O/P EST MOD 30 MIN: CPT | Performed by: FAMILY MEDICINE

## 2022-07-28 PROCEDURE — 83690 ASSAY OF LIPASE: CPT | Performed by: EMERGENCY MEDICINE

## 2022-07-28 PROCEDURE — 86900 BLOOD TYPING SEROLOGIC ABO: CPT | Performed by: EMERGENCY MEDICINE

## 2022-07-28 PROCEDURE — 85025 COMPLETE CBC W/AUTO DIFF WBC: CPT | Performed by: EMERGENCY MEDICINE

## 2022-07-28 PROCEDURE — 99284 EMERGENCY DEPT VISIT MOD MDM: CPT | Performed by: EMERGENCY MEDICINE

## 2022-07-28 PROCEDURE — 3074F SYST BP LT 130 MM HG: CPT | Performed by: FAMILY MEDICINE

## 2022-07-28 PROCEDURE — G1004 CDSM NDSC: HCPCS

## 2022-07-28 PROCEDURE — 3079F DIAST BP 80-89 MM HG: CPT | Performed by: FAMILY MEDICINE

## 2022-07-28 PROCEDURE — 99285 EMERGENCY DEPT VISIT HI MDM: CPT

## 2022-07-28 PROCEDURE — 86901 BLOOD TYPING SEROLOGIC RH(D): CPT | Performed by: EMERGENCY MEDICINE

## 2022-07-28 PROCEDURE — 85730 THROMBOPLASTIN TIME PARTIAL: CPT | Performed by: EMERGENCY MEDICINE

## 2022-07-28 PROCEDURE — 74177 CT ABD & PELVIS W/CONTRAST: CPT

## 2022-07-28 RX ADMIN — IOHEXOL 66 ML: 350 INJECTION, SOLUTION INTRAVENOUS at 20:21

## 2022-07-28 NOTE — PROGRESS NOTES
Assessment and Plan:    1  Rectal bleed  2  Abdominal pain right lower quadrant  3  AFib/anticoagulated on Coumadin  4  Hypertension, stable  5  Gastric AVM/polyp  6  Colon polyp  In light of active bleeding on Coumadin will send to ER at [facility] for further evaluation definitive treatment tonight    Patient is in agreement      Problem List Items Addressed This Visit        Digestive    Gastric polyps     As above         Relevant Orders    Transfer to other facility (Completed)    Gastric AVM     Status post EGD with coagulation 2021         Relevant Orders    Transfer to other facility (Completed)    Colon polyp    Relevant Orders    Transfer to other facility (Completed)       Cardiovascular and Mediastinum    Hypertension     Stable continue present therapy         Relevant Orders    Transfer to other facility (Completed)    Atrial fibrillation ([facility] [address] )     On Coumadin         Relevant Orders    Transfer to other facility (Completed)       Other    Anticoagulated on Coumadin     Sent to ER         Relevant Orders    Transfer to other facility (Completed)      Other Visit Diagnoses     Right lower quadrant abdominal pain    -  Primary    Relevant Orders    Transfer to other facility (Completed)    Rectal bleed        Relevant Orders    Transfer to other facility (Completed)                 Diagnoses and all orders for this visit:    Right lower quadrant abdominal pain  -     Transfer to other facility    Rectal bleed  -     Transfer to other facility    Gastric AVM  -     Transfer to other facility    Gastric polyps  -     Transfer to other facility    Polyp of colon, unspecified part of colon, unspecified type  -     Transfer to other facility    Primary hypertension  -     Transfer to other facility    Paroxysmal atrial fibrillation ([facility] [address] )  -     Transfer to other facility    Anticoagulated on Coumadin  -     Transfer to other facility            Subjective:      Patient ID: Chriss andrews a 58 y o  male  CC:    Chief Complaint   Patient presents with    Rectal Bleeding     Onset yesterday  Today's bm was tarry and loose  He also said he has been having dry heaves  HPI:    The past 2 days patient has been having some dry heaves some mild right lower abdominal pain and rectal bleeding  Patient does have a history of colon polyp/gastric polyp/gastric AVMs  Patient is actively on Coumadin  The following portions of the patient's history were reviewed and updated as appropriate: allergies, current medications, past family history, past medical history, past social history, past surgical history and problem list       Review of Systems   Constitutional: Negative  HENT: Negative  Eyes: Negative  Respiratory: Negative  Cardiovascular: Negative  Gastrointestinal:        HPI   Endocrine: Negative  Genitourinary: Negative  Musculoskeletal: Negative  Skin: Negative  Allergic/Immunologic: Negative  Neurological: Negative  Hematological:        HPI   Psychiatric/Behavioral: Negative  Data to review:       Objective:    Vitals:    07/28/22 1645   BP: 128/80   Pulse: 60   Weight: 108 kg (238 lb)   Height: 5' 8" (1 727 m)        Physical Exam  Vitals and nursing note reviewed  Constitutional:       Appearance: Normal appearance  HENT:      Head: Normocephalic and atraumatic  Eyes:      General: No scleral icterus  Cardiovascular:      Rate and Rhythm: Normal rate and regular rhythm  Heart sounds: Normal heart sounds  Pulmonary:      Effort: Pulmonary effort is normal       Breath sounds: Normal breath sounds  Abdominal:      General: Bowel sounds are normal  There is no distension  Palpations: Abdomen is soft  There is no mass  Tenderness: There is abdominal tenderness  There is no right CVA tenderness, left CVA tenderness, guarding or rebound  Hernia: No hernia is present        Comments: Mild right lower quadrant tenderness to palpitation   Genitourinary:     Penis: Normal        Prostate: Normal       Rectum: Guaiac result positive  Comments: Patient with hemato test positive and grossly bloody black stool in rectal vault  Musculoskeletal:      Cervical back: Neck supple  No rigidity  Right lower leg: No edema  Left lower leg: No edema  Skin:     General: Skin is warm and dry  Neurological:      General: No focal deficit present  Mental Status: He is alert     Psychiatric:         Mood and Affect: Mood normal

## 2022-07-28 NOTE — ED ATTENDING ATTESTATION
7/28/2022  IChrissy MD, saw and evaluated the patient  I have discussed the patient with the resident/non-physician practitioner and agree with the resident's/non-physician practitioner's findings, Plan of Care, and MDM as documented in the resident's/non-physician practitioner's note, except where noted  All available labs and Radiology studies were reviewed  I was present for key portions of any procedure(s) performed by the resident/non-physician practitioner and I was immediately available to provide assistance  At this point I agree with the current assessment done in the Emergency Department  I have conducted an independent evaluation of this patient a history and physical is as follows:    ED Course         Critical Care Time  Procedures    59 yo male with hx of prior cva on coumadin, here for rectal bleeding last two days  Pt with tarry stool initially now bright red blood  Pt sent in by pcp  Pt also with intermittent rlq abdominal pain for few months  Pt with no fever, no urinary complaints, no diarrhea, no cp, no sob, no lightheadedness  Vss, afebrile, lungs cta, rrr, abdomen soft nontender, no cva tenderness  Rectal with bright red blood  Gi bleed on coumadin  Labs, inr, ct a/p

## 2022-07-28 NOTE — PATIENT INSTRUCTIONS
Patient will go to [facility] ER for evaluation of abdominal pain and rectal bleed

## 2022-07-29 ENCOUNTER — HOSPITAL ENCOUNTER (INPATIENT)
Facility: HOSPITAL | Age: 62
LOS: 1 days | Discharge: HOME | DRG: 313 | End: 2022-07-31
Attending: EMERGENCY MEDICINE | Admitting: FAMILY MEDICINE
Payer: COMMERCIAL

## 2022-07-29 ENCOUNTER — APPOINTMENT (EMERGENCY)
Dept: RADIOLOGY | Facility: HOSPITAL | Age: 62
DRG: 313 | End: 2022-07-29
Payer: COMMERCIAL

## 2022-07-29 DIAGNOSIS — R42 DIZZINESS: ICD-10-CM

## 2022-07-29 DIAGNOSIS — I10 PRIMARY HYPERTENSION: ICD-10-CM

## 2022-07-29 DIAGNOSIS — R07.9 CHEST PAIN: Primary | ICD-10-CM

## 2022-07-29 DIAGNOSIS — D68.61 ANTIPHOSPHOLIPID ANTIBODY WITH HYPERCOAGULABLE STATE (HCC): ICD-10-CM

## 2022-07-29 DIAGNOSIS — Z79.01 ANTICOAGULATED ON COUMADIN: ICD-10-CM

## 2022-07-29 DIAGNOSIS — I48.0 PAROXYSMAL ATRIAL FIBRILLATION (HCC): ICD-10-CM

## 2022-07-29 LAB
ALBUMIN SERPL BCP-MCNC: 3.7 G/DL (ref 3.5–5)
ALP SERPL-CCNC: 135 U/L (ref 46–116)
ALT SERPL W P-5'-P-CCNC: 30 U/L (ref 12–78)
ANION GAP SERPL CALCULATED.3IONS-SCNC: 5 MMOL/L (ref 4–13)
AST SERPL W P-5'-P-CCNC: 21 U/L (ref 5–45)
BASOPHILS # BLD AUTO: 0.01 THOUSANDS/ΜL (ref 0–0.1)
BASOPHILS NFR BLD AUTO: 0 % (ref 0–1)
BILIRUB SERPL-MCNC: 1.89 MG/DL (ref 0.2–1)
BUN SERPL-MCNC: 10 MG/DL (ref 5–25)
CALCIUM SERPL-MCNC: 8.9 MG/DL (ref 8.3–10.1)
CARDIAC TROPONIN I PNL SERPL HS: 14 NG/L
CHLORIDE SERPL-SCNC: 107 MMOL/L (ref 96–108)
CO2 SERPL-SCNC: 28 MMOL/L (ref 21–32)
CREAT SERPL-MCNC: 1.18 MG/DL (ref 0.6–1.3)
EOSINOPHIL # BLD AUTO: 0.13 THOUSAND/ΜL (ref 0–0.61)
EOSINOPHIL NFR BLD AUTO: 2 % (ref 0–6)
ERYTHROCYTE [DISTWIDTH] IN BLOOD BY AUTOMATED COUNT: 13.8 % (ref 11.6–15.1)
GFR SERPL CREATININE-BSD FRML MDRD: 65 ML/MIN/1.73SQ M
GLUCOSE SERPL-MCNC: 101 MG/DL (ref 65–140)
HCT VFR BLD AUTO: 43.2 % (ref 36.5–49.3)
HGB BLD-MCNC: 14.4 G/DL (ref 12–17)
IMM GRANULOCYTES # BLD AUTO: 0.02 THOUSAND/UL (ref 0–0.2)
IMM GRANULOCYTES NFR BLD AUTO: 0 % (ref 0–2)
INR PPP: 2.44 (ref 0.84–1.19)
LYMPHOCYTES # BLD AUTO: 1.24 THOUSANDS/ΜL (ref 0.6–4.47)
LYMPHOCYTES NFR BLD AUTO: 20 % (ref 14–44)
MCH RBC QN AUTO: 28.9 PG (ref 26.8–34.3)
MCHC RBC AUTO-ENTMCNC: 33.3 G/DL (ref 31.4–37.4)
MCV RBC AUTO: 87 FL (ref 82–98)
MONOCYTES # BLD AUTO: 0.57 THOUSAND/ΜL (ref 0.17–1.22)
MONOCYTES NFR BLD AUTO: 9 % (ref 4–12)
NEUTROPHILS # BLD AUTO: 4.15 THOUSANDS/ΜL (ref 1.85–7.62)
NEUTS SEG NFR BLD AUTO: 69 % (ref 43–75)
NRBC BLD AUTO-RTO: 0 /100 WBCS
PLATELET # BLD AUTO: 171 THOUSANDS/UL (ref 149–390)
PMV BLD AUTO: 9.2 FL (ref 8.9–12.7)
POTASSIUM SERPL-SCNC: 3 MMOL/L (ref 3.5–5.3)
PROT SERPL-MCNC: 6.7 G/DL (ref 6.4–8.4)
PROTHROMBIN TIME: 26.8 SECONDS (ref 11.6–14.5)
RBC # BLD AUTO: 4.98 MILLION/UL (ref 3.88–5.62)
SODIUM SERPL-SCNC: 140 MMOL/L (ref 135–147)
WBC # BLD AUTO: 6.12 THOUSAND/UL (ref 4.31–10.16)

## 2022-07-29 PROCEDURE — 71045 X-RAY EXAM CHEST 1 VIEW: CPT

## 2022-07-29 PROCEDURE — 99285 EMERGENCY DEPT VISIT HI MDM: CPT | Performed by: EMERGENCY MEDICINE

## 2022-07-29 PROCEDURE — 80053 COMPREHEN METABOLIC PANEL: CPT | Performed by: EMERGENCY MEDICINE

## 2022-07-29 PROCEDURE — 36415 COLL VENOUS BLD VENIPUNCTURE: CPT

## 2022-07-29 PROCEDURE — 99285 EMERGENCY DEPT VISIT HI MDM: CPT

## 2022-07-29 PROCEDURE — 84484 ASSAY OF TROPONIN QUANT: CPT | Performed by: EMERGENCY MEDICINE

## 2022-07-29 PROCEDURE — 93005 ELECTROCARDIOGRAM TRACING: CPT

## 2022-07-29 PROCEDURE — 85610 PROTHROMBIN TIME: CPT

## 2022-07-29 PROCEDURE — 85025 COMPLETE CBC W/AUTO DIFF WBC: CPT | Performed by: EMERGENCY MEDICINE

## 2022-07-29 RX ORDER — WARFARIN SODIUM 1 MG/1
TABLET ORAL
Qty: 90 TABLET | Refills: 1 | Status: SHIPPED | OUTPATIENT
Start: 2022-07-29 | End: 2022-07-31

## 2022-07-29 RX ORDER — POTASSIUM CHLORIDE 20 MEQ/1
40 TABLET, EXTENDED RELEASE ORAL ONCE
Status: COMPLETED | OUTPATIENT
Start: 2022-07-29 | End: 2022-07-29

## 2022-07-29 RX ADMIN — POTASSIUM CHLORIDE 40 MEQ: 1500 TABLET, EXTENDED RELEASE ORAL at 23:10

## 2022-07-29 NOTE — DISCHARGE INSTRUCTIONS
Your CT showed "No evidence of bowel obstruction  Diverticulosis without evidence of diverticulitis or colitis  No findings to suggest proctitis "    Follow up with GI  Return to the ED if symptoms worsen

## 2022-07-29 NOTE — ED PROVIDER NOTES
History  Chief Complaint   Patient presents with    Rectal Bleeding     RLQ pain rectal bleeding ivan red blood with each BM      HPI    77-year-old male, past medical history significant for prior CVA x2 on Coumadin, presenting for evaluation of bright red blood per rectum x2 days and right lower quadrant abdominal pain for several months  Patient states that he has had this intermittent right lower quadrant abdominal pain for several months and has had multiple medical evaluations which have not found an answer for his pain  Over the past 2 days he noticed bright red blood per rectum when having a bowel movement  He also noticed bright red blood on his toilet paper after wiping  Cannot recall any factors that relieve or worsen the abdominal pain  Denies fever, chills, urinary symptoms, diarrhea, constipation, chest pain, shortness of breath, lightheadedness, numbness, weakness  Patient is compliant with his Coumadin and his last INR was 2 2  Prior to Admission Medications   Prescriptions Last Dose Informant Patient Reported?  Taking?   acetaminophen (TYLENOL) 500 mg tablet  Self Yes No   Sig: Take 500 mg by mouth every 6 (six) hours as needed for mild pain 2 tab   amoxicillin (AMOXIL) 500 mg capsule  Self Yes No   aspirin (ECOTRIN LOW STRENGTH) 81 mg EC tablet  Self No No   Sig: Take 1 tablet (81 mg total) by mouth daily   atorvastatin (LIPITOR) 40 mg tablet  Self No No   Sig: Take 1 tablet (40 mg total) by mouth daily with dinner   diltiazem (CARDIZEM CD) 240 mg 24 hr capsule  Self No No   Sig: Take 1 capsule (240 mg total) by mouth daily   famotidine (PEPCID) 40 MG tablet  Self No No   Sig: TAKE 1 TABLET BY MOUTH EVERY DAY   furosemide (LASIX) 20 mg tablet  Self No No   Sig: Take 1 tablet (20 mg total) by mouth daily   metoprolol succinate (TOPROL-XL) 50 mg 24 hr tablet  Self No No   Sig: Take 3 tablets (150 mg total) by mouth daily   mometasone (ELOCON) 0 1 % cream  Self No No   Sig: Apply topically daily   Patient taking differently: Apply topically daily PRN   pantoprazole (PROTONIX) 40 mg tablet  Self No No   Sig: TAKE 1 TABLET BY MOUTH EVERY DAY   warfarin (COUMADIN) 1 mg tablet  Self No No   Sig: TAKE 1 TABLET DAILY WITH 5MG TABS OR AS DIRECTED   warfarin (COUMADIN) 5 mg tablet  Self No No   Sig: TAKE 1 TABLET BY MOUTH EVERY DAY      Facility-Administered Medications: None       Past Medical History:   Diagnosis Date    Aneurysm of thoracic aorta ([facility] [address] )     last assessed 11/20/17    Anxiety     currently on no meds    Arthritis     Bilateral inguinal hernia     Cardiac disease     Depression     GERD (gastroesophageal reflux disease)     Hearing loss of aging     Hyperlipidemia     Hypertension     Irritable bowel syndrome     Kidney stone     Obesity     Occasional tremors     left arm since stroke    Palpitations     PONV (postoperative nausea and vomiting)     and headache x 3 days    Sciatica     right and left  side    Shortness of breath     on exertion    Sleep apnea     is not using a CPAP    Spitting up blood 05/21/2021    Resolved    Status post placement of implantable loop recorder     Stroke ([facility] [address] ) 11/2014    Stroke ([facility] [address] ) 03/2021    TIA (transient ischemic attack)        Past Surgical History:   Procedure Laterality Date    AORTA SURGERY      thoracic - aneurysmorrhaphy    APPENDECTOMY      ASCENDING AORTIC ANEURYSM REPAIR      resolved 8/19/15    CARDIAC LOOP RECORDER      CHOLECYSTECTOMY      laparoscopic    COLONOSCOPY      CYSTOSCOPY      with removal of object- stent removal    KNEE ARTHROSCOPY Right 1996    with medial meniscus repair    LITHOTRIPSY      renal    ORTHOPEDIC SURGERY      WA FRAGMENT KIDNEY STONE/ ESWL Left 5/17/2018    Procedure: LITHROTRIPSY EXTRACORPORAL SHOCKWAVE (ESWL);   Surgeon: Sonny Powers MD;  Location: [facility]  MAIN OR;  Service: Urology    [place] [address] Bilateral 12/9/2021    Procedure: ROBOTIC REPAIR HERNIA INGUINAL;  Surgeon: Puma Stewart MD;  Location: [facility] Main [facility];  Service: General    THUMB ARTHROSCOPY Right 1976    ligament repair    UPPER GASTROINTESTINAL ENDOSCOPY         Family History   Problem Relation Age of Onset    Diverticulitis Mother         of colon    Nephrolithiasis Mother     Emphysema Father     Nephrolithiasis Sister     Heart attack Maternal Grandfather 72    Breast cancer Paternal Grandmother     Lung cancer Paternal Grandfather     Cancer Family     Coronary artery disease Family     Diabetes Family         sibling    Hypertension Family         sibling    Hernia Family         sibling     I have reviewed and agree with the history as documented  E-Cigarette/Vaping    E-Cigarette Use Never User      E-Cigarette/Vaping Substances    Nicotine No     THC No     CBD No     Flavoring No     Other No     Unknown No      Social History     Tobacco Use    Smoking status: Never Smoker    Smokeless tobacco: Never Used    Tobacco comment: no second hand smoke exposure   Vaping Use    Vaping Use: Never used   Substance Use Topics    Alcohol use: Not Currently    Drug use: No        Review of Systems   Constitutional: Negative for chills and fever  HENT: Negative for sore throat  Respiratory: Negative for cough and shortness of breath  Cardiovascular: Negative for chest pain, palpitations and leg swelling  Gastrointestinal: Positive for abdominal pain and blood in stool  Negative for diarrhea, nausea and vomiting  Genitourinary: Negative for dysuria and frequency  Musculoskeletal: Negative for myalgias  Skin: Negative for rash and wound  Neurological: Negative for dizziness, light-headedness and headaches  All other systems reviewed and are negative        Physical Exam  ED Triage Vitals [07/28/22 1809]   Temp Pulse Respirations Blood Pressure SpO2   -- (!) 53 18 (!) 184/87 96 %      Temp src Heart Rate Source Patient Position - Orthostatic VS BP Location FiO2 (%)   -- Monitor -- -- --      Pain Score       No Pain             Orthostatic Vital Signs  Vitals:    07/28/22 1809   BP: (!) 184/87   Pulse: (!) 53       Physical Exam  Vitals and nursing note reviewed  Constitutional:       General: He is not in acute distress  Appearance: Normal appearance  He is not ill-appearing or toxic-appearing  HENT:      Head: Normocephalic and atraumatic  Right Ear: External ear normal       Left Ear: External ear normal       Nose: Nose normal       Mouth/Throat:      Mouth: Mucous membranes are moist       Pharynx: Oropharynx is clear  Eyes:      General: No scleral icterus  Extraocular Movements: Extraocular movements intact  Cardiovascular:      Rate and Rhythm: Normal rate and regular rhythm  Pulses: Normal pulses  Pulmonary:      Effort: Pulmonary effort is normal  No respiratory distress  Breath sounds: Normal breath sounds  Abdominal:      Palpations: Abdomen is soft  Tenderness: There is no abdominal tenderness  Genitourinary:     Rectum: Normal  Guaiac result positive  Comments: Bright red blood per rectum  no hemorrhoids  No anal fissures  Musculoskeletal:         General: Normal range of motion  Cervical back: Normal range of motion and neck supple  Skin:     General: Skin is warm  Capillary Refill: Capillary refill takes less than 2 seconds  Neurological:      General: No focal deficit present  Mental Status: He is alert and oriented to person, place, and time           ED Medications  Medications   iohexol (OMNIPAQUE) 350 MG/ML injection (MULTI-DOSE) 66 mL (66 mL Intravenous Given 7/28/22 2021)       Diagnostic Studies  Results Reviewed     Procedure Component Value Units Date/Time    Protime-INR [[id number]]  (Abnormal) Collected: 07/28/22 1842    Lab Status: Final result Specimen: Blood from Arm, Left Updated: 07/28/22 1934     Protime 27 4 seconds      INR 2 51    APTT [[id number]]  (Normal) Collected: 07/28/22 1842    Lab Status: Final result Specimen: Blood from Arm, Left Updated: 07/28/22 1934     PTT 36 seconds     Comprehensive metabolic panel [[id number]]  (Abnormal) Collected: 07/28/22 1842    Lab Status: Final result Specimen: Blood from Arm, Left Updated: 07/28/22 1913     Sodium 140 mmol/L      Potassium 3 3 mmol/L      Chloride 109 mmol/L      CO2 27 mmol/L      ANION GAP 4 mmol/L      BUN 10 mg/dL      Creatinine 1 04 mg/dL      Glucose 98 mg/dL      Calcium 9 0 mg/dL      Corrected Calcium 9 6 mg/dL      AST 19 U/L      ALT 30 U/L      Alkaline Phosphatase 125 U/L      Total Protein 6 4 g/dL      Albumin 3 3 g/dL      Total Bilirubin 1 55 mg/dL      eGFR 76 ml/min/1 73sq m     Narrative:      [place] guidelines for Chronic Kidney Disease (CKD):     Stage 1 with normal or high GFR (GFR > 90 mL/min/1 73 square meters)    Stage 2 Mild CKD (GFR = 60-89 mL/min/1 73 square meters)    Stage 3A Moderate CKD (GFR = 45-59 mL/min/1 73 square meters)    Stage 3B Moderate CKD (GFR = 30-44 mL/min/1 73 square meters)    Stage 4 Severe CKD (GFR = 15-29 mL/min/1 73 square meters)    Stage 5 End Stage CKD (GFR <15 mL/min/1 73 square meters)  Note: GFR calculation is accurate only with a steady state creatinine    Lipase [[id number]]  (Normal) Collected: 07/28/22 1842    Lab Status: Final result Specimen: Blood from Arm, Left Updated: 07/28/22 1913     Lipase 100 u/L     CBC and differential [[id number]] Collected: 07/28/22 1842    Lab Status: Final result Specimen: Blood from Arm, Left Updated: 07/28/22 1853     WBC 5 96 Thousand/uL      RBC 4 73 Million/uL      Hemoglobin 13 7 g/dL      Hematocrit 41 5 %      MCV 88 fL      MCH 29 0 pg      MCHC 33 0 g/dL      RDW 13 8 %      MPV 9 8 fL      Platelets 074 Thousands/uL      nRBC 0 /100 WBCs      Neutrophils Relative 67 %      Immat GRANS % 0 %      Lymphocytes Relative 21 %      Monocytes Relative 9 %      Eosinophils Relative 3 %      Basophils Relative 0 %      Neutrophils Absolute 3 97 Thousands/µL      Immature Grans Absolute 0 01 Thousand/uL      Lymphocytes Absolute 1 26 Thousands/µL      Monocytes Absolute 0 54 Thousand/µL      Eosinophils Absolute 0 17 Thousand/µL      Basophils Absolute 0 01 Thousands/µL                  CT abdomen pelvis with contrast   Final Result by Nehal Arzola MD (07/28 2103)      No evidence of bowel obstruction  Diverticulosis without evidence of diverticulitis or colitis  No findings to suggest proctitis  Workstation performed: [id number]               Procedures  Procedures      ED Course  ED Course as of 07/28/22 2327   u Jul 28, 2022   1900 Hemoglobin: 13 7                                       MDM  Number of Diagnoses or Management Options  BRBPR (bright red blood per rectum)  Diverticulosis  Diagnosis management comments: 66-year-old male, on Coumadin for CVA, presenting for evaluation of bright red blood per rectum and right lower quadrant abdominal pain  On exam, the patient has bright red blood per rectum, abdomen is otherwise soft, nontender  No CVA tenderness  My differential diagnosis includes but is not limited to diverticulosis, diverticulitis, anemia, appendicitis colitis  Plan:  CBC, CMP, lipase, type and screen, coags, CT abdomen pelvis  Patients Hgb is stable, CT scan shows diverticulosis  I discussed these findings with the patient, offered admission for scope  Patient prefers to have this done as outpatient  I advised the patient to follow up with GI and gave strict return to ED precautions  I reviewed all testing with the patient: see above  I gave oral return precautions for what to return for in addition to the written return precautions  The patient (and any family present: n/a) verbalized understanding of the discharge instructions and warnings that would necessitate return to the Emergency Department    I specifically highlighted areas of special concern regarding the written and verbal discharge instructions and return precautions  All questions were answered prior to discharge  Disposition  Final diagnoses:   BRBPR (bright red blood per rectum)   Diverticulosis     Time reflects when diagnosis was documented in both MDM as applicable and the Disposition within this note     Time User Action Codes Description Comment    7/28/2022  9:28 PM Park Guevara Add [K62 5] BRBPR (bright red blood per rectum)     7/28/2022  9:28 PM Park Guevara Add [K57 90] Diverticulosis       ED Disposition     ED Disposition   Discharge    Condition   Stable    Date/Time   Thu Jul 28, 2022  9:29 PM    Comment   Hans Christina 22 discharge to home/self care                 Follow-up Information     Follow up With Specialties Details Why Contact Info Additional Information    Jaimie Horan MD [facility] Medicine  For Emergency Department Follow-up [address] [id number]  [id number] [place] [address] 160 Emergency Department Emergency Medicine  If symptoms worsen [address] [id number]  [address] [facility] 64 [facility] Emergency Department, [address], [address] [facility]    [facility] [place] [facility]  For Emergency Department Follow-up [address] [facility] [address] [facility] [id number]  Isabella Mon[address] [facility] [address], [address], [place], [address]          Discharge Medication List as of 7/28/2022  9:29 PM      CONTINUE these medications which have NOT CHANGED    Details   acetaminophen (TYLENOL) 500 mg tablet Take 500 mg by mouth every 6 (six) hours as needed for mild pain 2 tab, Historical Med      amoxicillin (AMOXIL) 500 mg capsule Starting Wed 10/13/2021, Historical Med      aspirin (ECOTRIN LOW STRENGTH) 81 mg EC tablet Take 1 tablet (81 mg total) by mouth daily, Starting Tue 2/22/2022, Normal      atorvastatin (LIPITOR) 40 mg tablet Take 1 tablet (40 mg total) by mouth daily with dinner, Starting Thu 6/30/2022, Normal      diltiazem (CARDIZEM CD) 240 mg 24 hr capsule Take 1 capsule (240 mg total) by mouth daily, Starting Tue 3/8/2022, Normal      famotidine (PEPCID) 40 MG tablet TAKE 1 TABLET BY MOUTH EVERY DAY, Normal      furosemide (LASIX) 20 mg tablet Take 1 tablet (20 mg total) by mouth daily, Starting Thu 6/2/2022, Normal      metoprolol succinate (TOPROL-XL) 50 mg 24 hr tablet Take 3 tablets (150 mg total) by mouth daily, Starting Thu 6/30/2022, Normal      mometasone (ELOCON) 0 1 % cream Apply topically daily, Starting Thu 6/30/2022, Normal      pantoprazole (PROTONIX) 40 mg tablet TAKE 1 TABLET BY MOUTH EVERY DAY, Normal      !! warfarin (COUMADIN) 1 mg tablet TAKE 1 TABLET DAILY WITH 5MG TABS OR AS DIRECTED, Normal      !! warfarin (COUMADIN) 5 mg tablet TAKE 1 TABLET BY MOUTH EVERY DAY, Normal       !! - Potential duplicate medications found  Please discuss with provider  No discharge procedures on file  PDMP Review     None           ED Provider  Attending physically available and evaluated Catalina Dumont I managed the patient along with the ED Attending      Electronically Signed by         Gisell Moore DO  07/28/22 2869

## 2022-07-30 PROBLEM — R07.9 CHEST PAIN: Status: ACTIVE | Noted: 2022-07-30

## 2022-07-30 PROBLEM — E87.6 HYPOKALEMIA: Status: ACTIVE | Noted: 2022-07-30

## 2022-07-30 PROBLEM — R42 DIZZINESS: Status: ACTIVE | Noted: 2022-07-30

## 2022-07-30 LAB
2HR DELTA HS TROPONIN: 2 NG/L
ANION GAP SERPL CALCULATED.3IONS-SCNC: 5 MMOL/L (ref 4–13)
BUN SERPL-MCNC: 7 MG/DL (ref 5–25)
CALCIUM SERPL-MCNC: 8.7 MG/DL (ref 8.3–10.1)
CARDIAC TROPONIN I PNL SERPL HS: 16 NG/L
CHLORIDE SERPL-SCNC: 109 MMOL/L (ref 96–108)
CO2 SERPL-SCNC: 28 MMOL/L (ref 21–32)
CREAT SERPL-MCNC: 1.08 MG/DL (ref 0.6–1.3)
ERYTHROCYTE [DISTWIDTH] IN BLOOD BY AUTOMATED COUNT: 14.1 % (ref 11.6–15.1)
FLUAV RNA RESP QL NAA+PROBE: NEGATIVE
FLUBV RNA RESP QL NAA+PROBE: NEGATIVE
GFR SERPL CREATININE-BSD FRML MDRD: 73 ML/MIN/1.73SQ M
GLUCOSE SERPL-MCNC: 110 MG/DL (ref 65–140)
GLUCOSE SERPL-MCNC: 110 MG/DL (ref 65–140)
HCT VFR BLD AUTO: 44.2 % (ref 36.5–49.3)
HGB BLD-MCNC: 14.8 G/DL (ref 12–17)
MCH RBC QN AUTO: 29.4 PG (ref 26.8–34.3)
MCHC RBC AUTO-ENTMCNC: 33.5 G/DL (ref 31.4–37.4)
MCV RBC AUTO: 88 FL (ref 82–98)
PLATELET # BLD AUTO: 167 THOUSANDS/UL (ref 149–390)
PMV BLD AUTO: 9.8 FL (ref 8.9–12.7)
POTASSIUM SERPL-SCNC: 3.3 MMOL/L (ref 3.5–5.3)
RBC # BLD AUTO: 5.03 MILLION/UL (ref 3.88–5.62)
RSV RNA RESP QL NAA+PROBE: NEGATIVE
SARS-COV-2 RNA RESP QL NAA+PROBE: NEGATIVE
SODIUM SERPL-SCNC: 142 MMOL/L (ref 135–147)
WBC # BLD AUTO: 5.99 THOUSAND/UL (ref 4.31–10.16)

## 2022-07-30 PROCEDURE — 84484 ASSAY OF TROPONIN QUANT: CPT | Performed by: EMERGENCY MEDICINE

## 2022-07-30 PROCEDURE — 99220 PR INITIAL OBSERVATION CARE/DAY 70 MINUTES: CPT | Performed by: INTERNAL MEDICINE

## 2022-07-30 PROCEDURE — 99222 1ST HOSP IP/OBS MODERATE 55: CPT | Performed by: INTERNAL MEDICINE

## 2022-07-30 PROCEDURE — 85027 COMPLETE CBC AUTOMATED: CPT | Performed by: INTERNAL MEDICINE

## 2022-07-30 PROCEDURE — 80048 BASIC METABOLIC PNL TOTAL CA: CPT | Performed by: INTERNAL MEDICINE

## 2022-07-30 PROCEDURE — 93005 ELECTROCARDIOGRAM TRACING: CPT

## 2022-07-30 PROCEDURE — 36415 COLL VENOUS BLD VENIPUNCTURE: CPT | Performed by: EMERGENCY MEDICINE

## 2022-07-30 PROCEDURE — 99232 SBSQ HOSP IP/OBS MODERATE 35: CPT | Performed by: NURSE PRACTITIONER

## 2022-07-30 PROCEDURE — 0241U HB NFCT DS VIR RESP RNA 4 TRGT: CPT

## 2022-07-30 PROCEDURE — 82948 REAGENT STRIP/BLOOD GLUCOSE: CPT

## 2022-07-30 RX ORDER — PANTOPRAZOLE SODIUM 40 MG/1
40 TABLET, DELAYED RELEASE ORAL
Status: DISCONTINUED | OUTPATIENT
Start: 2022-07-30 | End: 2022-07-31 | Stop reason: HOSPADM

## 2022-07-30 RX ORDER — MAGNESIUM HYDROXIDE/ALUMINUM HYDROXICE/SIMETHICONE 120; 1200; 1200 MG/30ML; MG/30ML; MG/30ML
30 SUSPENSION ORAL EVERY 4 HOURS PRN
Status: DISCONTINUED | OUTPATIENT
Start: 2022-07-30 | End: 2022-07-31 | Stop reason: HOSPADM

## 2022-07-30 RX ORDER — POTASSIUM CHLORIDE 20 MEQ/1
40 TABLET, EXTENDED RELEASE ORAL ONCE
Status: COMPLETED | OUTPATIENT
Start: 2022-07-30 | End: 2022-07-30

## 2022-07-30 RX ORDER — PANTOPRAZOLE SODIUM 40 MG/1
40 TABLET, DELAYED RELEASE ORAL
Status: DISCONTINUED | OUTPATIENT
Start: 2022-07-30 | End: 2022-07-30

## 2022-07-30 RX ORDER — ONDANSETRON 2 MG/ML
4 INJECTION INTRAMUSCULAR; INTRAVENOUS EVERY 6 HOURS PRN
Status: DISCONTINUED | OUTPATIENT
Start: 2022-07-30 | End: 2022-07-31 | Stop reason: HOSPADM

## 2022-07-30 RX ORDER — WARFARIN SODIUM 5 MG/1
5 TABLET ORAL
Status: DISCONTINUED | OUTPATIENT
Start: 2022-08-01 | End: 2022-07-31 | Stop reason: HOSPADM

## 2022-07-30 RX ORDER — FAMOTIDINE 20 MG/1
20 TABLET, FILM COATED ORAL DAILY
Status: DISCONTINUED | OUTPATIENT
Start: 2022-07-30 | End: 2022-07-31 | Stop reason: HOSPADM

## 2022-07-30 RX ORDER — WARFARIN SODIUM 3 MG/1
6 TABLET ORAL
Status: DISCONTINUED | OUTPATIENT
Start: 2022-07-30 | End: 2022-07-31 | Stop reason: HOSPADM

## 2022-07-30 RX ORDER — DILTIAZEM HYDROCHLORIDE 120 MG/1
120 CAPSULE, COATED, EXTENDED RELEASE ORAL DAILY
Status: DISCONTINUED | OUTPATIENT
Start: 2022-07-31 | End: 2022-07-31 | Stop reason: HOSPADM

## 2022-07-30 RX ORDER — DILTIAZEM HYDROCHLORIDE 240 MG/1
240 CAPSULE, COATED, EXTENDED RELEASE ORAL DAILY
Status: DISCONTINUED | OUTPATIENT
Start: 2022-07-30 | End: 2022-07-30

## 2022-07-30 RX ORDER — ASPIRIN 81 MG/1
81 TABLET ORAL DAILY
Status: DISCONTINUED | OUTPATIENT
Start: 2022-07-30 | End: 2022-07-31 | Stop reason: HOSPADM

## 2022-07-30 RX ORDER — ATORVASTATIN CALCIUM 40 MG/1
40 TABLET, FILM COATED ORAL
Status: DISCONTINUED | OUTPATIENT
Start: 2022-07-30 | End: 2022-07-31 | Stop reason: HOSPADM

## 2022-07-30 RX ORDER — MAGNESIUM HYDROXIDE/ALUMINUM HYDROXICE/SIMETHICONE 120; 1200; 1200 MG/30ML; MG/30ML; MG/30ML
30 SUSPENSION ORAL ONCE
Status: COMPLETED | OUTPATIENT
Start: 2022-07-30 | End: 2022-07-30

## 2022-07-30 RX ORDER — NITROGLYCERIN 0.4 MG/1
0.4 TABLET SUBLINGUAL
Status: DISCONTINUED | OUTPATIENT
Start: 2022-07-30 | End: 2022-07-31 | Stop reason: HOSPADM

## 2022-07-30 RX ORDER — FUROSEMIDE 20 MG/1
20 TABLET ORAL DAILY
Status: DISCONTINUED | OUTPATIENT
Start: 2022-07-30 | End: 2022-07-31 | Stop reason: HOSPADM

## 2022-07-30 RX ADMIN — WARFARIN SODIUM 6 MG: 3 TABLET ORAL at 16:39

## 2022-07-30 RX ADMIN — POTASSIUM CHLORIDE 40 MEQ: 1500 TABLET, EXTENDED RELEASE ORAL at 09:47

## 2022-07-30 RX ADMIN — ALUMINA, MAGNESIA, AND SIMETHICONE ORAL SUSPENSION REGULAR STRENGTH 30 ML: 1200; 1200; 120 SUSPENSION ORAL at 03:31

## 2022-07-30 RX ADMIN — DILTIAZEM HYDROCHLORIDE 240 MG: 240 CAPSULE, COATED, EXTENDED RELEASE ORAL at 08:47

## 2022-07-30 RX ADMIN — ONDANSETRON 4 MG: 2 INJECTION INTRAMUSCULAR; INTRAVENOUS at 02:24

## 2022-07-30 RX ADMIN — PANTOPRAZOLE SODIUM 40 MG: 40 TABLET, DELAYED RELEASE ORAL at 05:40

## 2022-07-30 RX ADMIN — ATORVASTATIN CALCIUM 40 MG: 40 TABLET, FILM COATED ORAL at 16:38

## 2022-07-30 RX ADMIN — PANTOPRAZOLE SODIUM 40 MG: 40 TABLET, DELAYED RELEASE ORAL at 16:38

## 2022-07-30 RX ADMIN — METOPROLOL SUCCINATE 150 MG: 100 TABLET, EXTENDED RELEASE ORAL at 08:46

## 2022-07-30 RX ADMIN — FUROSEMIDE 20 MG: 20 TABLET ORAL at 08:46

## 2022-07-30 RX ADMIN — FAMOTIDINE 20 MG: 20 TABLET, FILM COATED ORAL at 08:46

## 2022-07-30 NOTE — ASSESSMENT & PLAN NOTE
· MIGUELITO 2  · Presenting with ongoing substernal chest pain, persistent albeit improved at time of admission     · Troponin x2 WNL, EKG without acute ischemic change  · Possible GI in nature versus secondary to elevated blood pressure verses anxiety; cardiac being to seem less likely however given his cardiac history needs to be in consideration  · Follow-up final troponin, monitor on telemetry  · Trial GI cocktail  · Reassess pain in a m , consider outpatient versus inpatient pharmacologic stress testing (patient stating he cannot complete exercise stress test)

## 2022-07-30 NOTE — H&P
[facility]  H&P- Rohan Ortiz 1960, 58 y o  male MRN: [id number]  Unit/Bed#: ED 23 Encounter: [id number]  Primary Care Provider: Jeffrey Egan MD   Date and time admitted to hospital: 7/29/2022  9:35 PM    * Chest pain  Assessment & Plan  · MIGUELITO 2  · Presenting with ongoing substernal chest pain, persistent albeit improved at time of admission  · Troponin x2 WNL, EKG without acute ischemic change  · Possible GI in nature versus secondary to elevated blood pressure verses anxiety; cardiac being to seem less likely however given his cardiac history needs to be in consideration  · Follow-up final troponin, monitor on telemetry  · Trial GI cocktail  · Reassess pain in a m , consider outpatient versus inpatient pharmacologic stress testing (patient stating he cannot complete exercise stress test)    Bright red blood per rectum  Assessment & Plan  · Ongoing for several days per patient, was seen in this hospital's ED 07/28/2022 for this  · Hemoglobin stable, has outpatient follow-up with GI 08/10/2022  · If worsens while inpatient or patient expresses concerns can discuss with GI    History of stroke  Assessment & Plan  · History of CVA, without current focal deficits  · Continue Coumadin, statin    Atrial fibrillation (HCC)  Assessment & Plan  · Rate controlled on metoprolol succinate and Cardizem CD, continue home doses  · Anticoagulated on Coumadin and therapeutic    GERD (gastroesophageal reflux disease)  Assessment & Plan  · Continue PPI    Hypertension  Assessment & Plan  · Blood pressure elevated on presentation, slowly improving  · Resume home antihypertensives in a m , monitor blood pressure per protocol      VTE Prophylaxis: Warfarin (Coumadin)  / sequential compression device   Code Status: Full code  POLST: POLST form is not discussed and not completed at this time      Anticipated Length of Stay:  Patient will be admitted on an Observation basis with an anticipated length of stay of  Less than 2 midnights  Justification for Hospital Stay: Please see detailed plans noted above  Chief Complaint:     Chest pain  History of Present Illness:  Gayle Roman is a 58 y o  male who presented for evaluation of chest pain developing the afternoon of presentation  Has a past medical history significant for reported antiphospholipid syndrome with history of prior CVA x2, atrial fibrillation anticoagulated on Coumadin, hypertension, hyperlipidemia, GERD, reported anxiety disorder  Patient reports he was in this hospital's ED 07/20/2022 with reported bright red blood per rectum with workup revealing stable hemoglobin and CT abdomen/pelvis revealing diverticulosis but without radiographic source of bleeding visualized; appears admission was offered however patient declined this and plan to follow up outpatient with GI  Initially was doing well after discharge however the afternoon of presentation approximately 1600H he developed chest pain which he described as dull, substernal, intermittently radiating toward the back, and associated with some dizziness/lightheadedness, diaphoresis initially, and nausea without vomiting  He denies any worsening with exertion or ivan shortness of breath  He did not take anything for the pain at home, however due to persistence for several hours he presented for evaluation  During ED evaluation here troponin x2 were within normal limits and EKG without acute ischemic change however given his history and persistent chest pain he is admitted as observation for further evaluation of his chest pain      Review of Systems:    Constitutional:  Denies fever or chills but previously reported diaphoresis  Eyes:  Denies change in visual acuity   HENT:  Denies nasal congestion or sore throat   Respiratory:  Denies cough or shortness of breath   Cardiovascular:  Denies edema but reported chest pain  GI:  Denies abdominal pain, nausea, vomiting, bloody stools or diarrhea   :  Denies dysuria   Musculoskeletal:  Denies back pain or joint pain   Integument:  Denies rash   Neurologic:  Denies headache, focal weakness or sensory changes but reported dizziness  Endocrine:  Denies polyuria or polydipsia   Lymphatic:  Denies swollen glands   Psychiatric:  Denies depression but previously reported anxiety    Past Medical and Surgical History:   Past Medical History:   Diagnosis Date    Aneurysm of thoracic aorta ([address] )     last assessed 11/20/17    Anxiety     currently on no meds    Arthritis     Bilateral inguinal hernia     Cardiac disease     Depression     GERD (gastroesophageal reflux disease)     Hearing loss of aging     Hyperlipidemia     Hypertension     Irritable bowel syndrome     Kidney stone     Obesity     Occasional tremors     left arm since stroke    Palpitations     PONV (postoperative nausea and vomiting)     and headache x 3 days    Sciatica     right and left  side    Shortness of breath     on exertion    Sleep apnea     is not using a CPAP    Spitting up blood 05/21/2021    Resolved    Status post placement of implantable loop recorder     Stroke [facility] [address] ) 11/2014    Stroke ([facility] [address] ) 03/2021    TIA (transient ischemic attack)      Past Surgical History:   Procedure Laterality Date    AORTA SURGERY      thoracic - aneurysmorrhaphy    APPENDECTOMY      ASCENDING AORTIC ANEURYSM REPAIR      resolved 8/19/15    CARDIAC LOOP RECORDER      CHOLECYSTECTOMY      laparoscopic    COLONOSCOPY      CYSTOSCOPY      with removal of object- stent removal    KNEE ARTHROSCOPY Right 1996    with medial meniscus repair    LITHOTRIPSY      renal    ORTHOPEDIC SURGERY      IN FRAGMENT KIDNEY STONE/ ESWL Left 5/17/2018    Procedure: LITHROTRIPSY EXTRACORPORAL SHOCKWAVE (ESWL);   Surgeon: Shayy Casas MD;  Location: [facility];  Service: Urology    IN [address] Bilateral 12/9/2021    Procedure: ROBOTIC REPAIR HERNIA INGUINAL;  Surgeon: Ambrocio Horner MD;  Location: [facility];  Service: General    THUMB ARTHROSCOPY Right 1976    ligament repair    UPPER GASTROINTESTINAL ENDOSCOPY         Meds/Allergies:    Current Facility-Administered Medications:     aluminum-magnesium hydroxide-simethicone (MYLANTA) oral suspension 30 mL, 30 mL, Oral, Q4H PRN, Delphine Shames, DO    aluminum-magnesium hydroxide-simethicone (MYLANTA) oral suspension 30 mL, 30 mL, Oral, Once, Delphine Shames, DO    aspirin (ECOTRIN LOW STRENGTH) EC tablet 81 mg, 81 mg, Oral, Daily, Delphine Shames, DO    atorvastatin (LIPITOR) tablet 40 mg, 40 mg, Oral, Daily With Dinner, Delphine Shames, DO    diltiazem (CARDIZEM CD) 24 hr capsule 240 mg, 240 mg, Oral, Daily, Delphine Shames, DO    famotidine (PEPCID) tablet 20 mg, 20 mg, Oral, Daily, Delphine Shames, DO    furosemide (LASIX) tablet 20 mg, 20 mg, Oral, Daily, Delphine Shames, DO    metoprolol succinate (TOPROL-XL) 24 hr tablet 150 mg, 150 mg, Oral, Daily, Delphine Shames, DO    nitroglycerin (NITROSTAT) SL tablet 0 4 mg, 0 4 mg, Sublingual, Q5 Min PRN, Delphine Shames, DO    ondansetron [facility]) injection 4 mg, 4 mg, Intravenous, Q6H PRN, Delphine Shames, DO, 4 mg at 07/30/22 0224    pantoprazole (PROTONIX) EC tablet 40 mg, 40 mg, Oral, Daily, Delphine Shames, DO    warfarin (COUMADIN) tablet 6 mg, 6 mg, Oral, See Admin Instructions, Delphine Shames, DO    Current Outpatient Medications:     acetaminophen (TYLENOL) 500 mg tablet, Take 500 mg by mouth every 6 (six) hours as needed for mild pain 2 tab, Disp: , Rfl:     amoxicillin (AMOXIL) 500 mg capsule, , Disp: , Rfl:     aspirin (ECOTRIN LOW STRENGTH) 81 mg EC tablet, Take 1 tablet (81 mg total) by mouth daily, Disp: 30 tablet, Rfl: 0    atorvastatin (LIPITOR) 40 mg tablet, Take 1 tablet (40 mg total) by mouth daily with dinner, Disp: 90 tablet, Rfl: 1    diltiazem (CARDIZEM CD) 240 mg 24 hr capsule, Take 1 capsule (240 mg total) by mouth daily, Disp: 30 capsule, Rfl: 5    famotidine (PEPCID) 40 MG tablet, TAKE 1 TABLET BY MOUTH EVERY DAY, Disp: 90 tablet, Rfl: 1    furosemide (LASIX) 20 mg tablet, Take 1 tablet (20 mg total) by mouth daily, Disp: 30 tablet, Rfl: 2    metoprolol succinate (TOPROL-XL) 50 mg 24 hr tablet, Take 3 tablets (150 mg total) by mouth daily, Disp: 270 tablet, Rfl: 1    mometasone (ELOCON) 0 1 % cream, Apply topically daily (Patient taking differently: Apply topically daily PRN), Disp: 45 g, Rfl: 0    pantoprazole (PROTONIX) 40 mg tablet, TAKE 1 TABLET BY MOUTH EVERY DAY, Disp: 90 tablet, Rfl: 1    warfarin (COUMADIN) 1 mg tablet, TAKE 1 TABLET DAILY WITH 5MG TABS OR AS DIRECTED, Disp: 90 tablet, Rfl: 1    warfarin (COUMADIN) 5 mg tablet, TAKE 1 TABLET BY MOUTH EVERY DAY, Disp: 90 tablet, Rfl: 1    Allergies: Allergies   Allergen Reactions    Losartan Angioedema    Aspirin Fatigue     Due to blood thinners      Bactrim [Sulfamethoxazole-Trimethoprim]     Eliquis [Apixaban] Other (See Comments)     Failed  Had embolic CVA    Lisinopril      Felt bad, was OK with Zestril brand name      Tramadol      History:  Marital Status:      Substance Use History:   Social History     Substance and Sexual Activity   Alcohol Use Not Currently     Social History     Tobacco Use   Smoking Status Never Smoker   Smokeless Tobacco Never Used   Tobacco Comment    no second hand smoke exposure     Social History     Substance and Sexual Activity   Drug Use No       Family History:  Family History   Problem Relation Age of Onset    Diverticulitis Mother         of colon    Nephrolithiasis Mother     Emphysema Father     Nephrolithiasis Sister     Heart attack Maternal Grandfather 72    Breast cancer Paternal Grandmother     Lung cancer Paternal Grandfather     Cancer Family     Coronary artery disease Family     Diabetes Family         sibling    Hypertension Family         sibling    Hernia Family         sibling Physical Exam:     Vitals:   Blood Pressure: 155/70 (07/30/22 0030)  Pulse: (!) 54 (07/30/22 0030)  Temperature: 97 5 °F (36 4 °C) (07/29/22 2131)  Temp Source: Temporal (07/29/22 2131)  Respirations: 21 (07/30/22 0030)  SpO2: 96 % (07/30/22 0030)    Constitutional:  Well developed, well nourished, no acute distress, non-toxic appearance   Eyes:  PERRL, conjunctiva normal   HENT:  Atraumatic, external ears normal, nose normal, oropharynx moist, no pharyngeal exudates  Neck- normal range of motion, no tenderness, supple   Respiratory:  No respiratory distress, normal breath sounds, no rales, no wheezing   Cardiovascular:  Mildly bradycardic rate, normal rhythm, systolic murmur, no gallops, no rubs   GI:  Soft, nondistended, normal bowel sounds, nontender, no organomegaly, no mass, no rebound, no guarding   :  No costovertebral angle tenderness   Musculoskeletal:  No edema, central chest wall discomfort to palpation although patient states this is different from presenting chest pain, no deformities  Back- no tenderness  Integument:  Well hydrated, no rash   Lymphatic:  No lymphadenopathy noted   Neurologic:  Alert &awake, communicative, CN 2-12 normal, normal motor function, normal sensory function, no focal deficits noted   Psychiatric:  Speech and behavior appropriate       Lab Results: I have personally reviewed pertinent reports        Results from last 7 days   Lab Units 07/29/22  2203   WBC Thousand/uL 6 12   HEMOGLOBIN g/dL 14 4   HEMATOCRIT % 43 2   PLATELETS Thousands/uL 171   NEUTROS PCT % 69   LYMPHS PCT % 20   MONOS PCT % 9   EOS PCT % 2     Results from last 7 days   Lab Units 07/29/22  2203   POTASSIUM mmol/L 3 0*   CHLORIDE mmol/L 107   CO2 mmol/L 28   BUN mg/dL 10   CREATININE mg/dL 1 18   CALCIUM mg/dL 8 9   ALK PHOS U/L 135*   ALT U/L 30   AST U/L 21     Results from last 7 days   Lab Units 07/29/22  2306   INR  2 44*       EKG:  Sinus bradycardia HR 53    Imaging: I have personally reviewed pertinent reports  and I have personally reviewed pertinent films in PACS     CXR:  Personally reviewed, no acute cardiopulmonary disease visualized  Final radiologist read is pending  VAS reflux lower limb venous duplex study with reflux assessment, complete bilateral    Result Date: 7/11/2022  Narrative:  [facility] REPORT CLINICAL: Indications: Patient presents for evaluation with veanous stasis  Patient states he has not been wearing compression stockings  Operative History: 2015-08-19 AAA repair Cardiac loop recorder thorasic aneurysmorrhaphy Risk Factors The patient has history of Obesity, HTN and Hyperlipidemia  FINDINGS:  Right      Valve   Reflux Time  Popliteal  Reflux         0 53     CONCLUSION: Impression: RIGHT LIMB: Deep venous incompetence is noted in the popliteal vein  The great saphenous vein is competent  The great saphenous vein exits the saphenous compartment in the mid thigh  The small saphenous vein is competent and does not communicate with the popliteal vein  There is no evidence of incompetent perforators in the thigh or calf  There is no evidence of deep vein thrombosis in the CFV, the proximal PFV, the femoral vein and the popliteal vein  LEFT LIMB: No evidence of deep venous incompetence  The great saphenous vein is competent  The great saphenous vein exits the saphenous compartment in the mid thigh  The small saphenous vein is competent and does not communicate with the popliteal vein  There is no evidence of incompetent perforators in the thigh or calf  There is no evidence of deep vein thrombosis in the CFV, the proximal PFV, the femoral vein and the popliteal vein  Study performed with patient standing and in steep Reverse Trendelenburg    SIGNATURE: Electronically Signed Alethea Greco on 2022-07-11 04:28:31 PM    CT abdomen pelvis with contrast    Result Date: 7/28/2022  Narrative: CT ABDOMEN AND PELVIS WITH IV CONTRAST INDICATION:   Right lower quadrant pain and rectal bleeding  COMPARISON:  5/21/2021  TECHNIQUE:  CT examination of the abdomen and pelvis was performed  Axial, sagittal, and coronal 2D reformatted images were created from the source data and submitted for interpretation  Radiation dose length product (DLP) for this visit:  769 57 mGy-cm   This examination, like all CT scans performed in the [facility], was performed utilizing techniques to minimize radiation dose exposure, including the use of iterative  reconstruction and automated exposure control  IV Contrast:  66 mL of iohexol (OMNIPAQUE) Enteric Contrast:  Enteric contrast was not administered  FINDINGS: ABDOMEN LOWER CHEST:  Postsurgical change from median sternotomy  Mild cardiomegaly  Clear lung bases  LIVER/BILIARY TREE:  Unremarkable  GALLBLADDER:  Cholecystectomy  SPLEEN:  Unremarkable  PANCREAS:  Unremarkable  ADRENAL GLANDS:  Unremarkable  KIDNEYS/URETERS:  Left renal cortical 1 cm cyst   Symmetric nephrographic phase enhancement of the kidneys  No obstructive uropathy  STOMACH AND BOWEL: Small hiatal hernia  Diverticulosis without evidence of diverticulitis or colitis  No bowel obstruction  APPENDIX:  Prior appendectomy  ABDOMINOPELVIC CAVITY:  No ascites  No pneumoperitoneum  No lymphadenopathy  VESSELS:  No abdominal aortic aneurysm  PELVIS REPRODUCTIVE ORGANS:  No prostate enlargement  URINARY BLADDER:  Unremarkable  ABDOMINAL WALL/INGUINAL REGIONS:  Unremarkable  OSSEOUS STRUCTURES:  No acute fracture or destructive osseous lesion  Impression: No evidence of bowel obstruction  Diverticulosis without evidence of diverticulitis or colitis  No findings to suggest proctitis  Workstation performed: [id number]         ** Please Note: Dragon 360 Dictation voice to text software was used in the creation of this document   **

## 2022-07-30 NOTE — ASSESSMENT & PLAN NOTE
· Ongoing for several days per patient, was seen in this hospital's ED 07/28/2022 for this  · Hemoglobin stable, has outpatient follow-up with GI 08/10/2022  · If worsens while inpatient or patient expresses concerns can discuss with GI

## 2022-07-30 NOTE — UTILIZATION REVIEW
Initial Clinical Review    WAS OBSERVATION 07/30/2022 @ 0114, CONVERTED TO INPATIENT ADMISSION 07/30/2022 @ 1545, DUE TO CONTINUED STAY REQUIRED TO CARE FOR PATIENT WITH    Continued further work up for continued chest pain  Admission: Date/Time/Statement:   Admission Orders (From admission, onward)     Ordered        07/30/22 1545  Inpatient Admission  Once            07/30/22 0114  Place in Observation  Once                      Orders Placed This Encounter   Procedures    Inpatient Admission     Standing Status:   Standing     Number of Occurrences:   1     Order Specific Question:   Level of Care     Answer:   Med Surg [16]     Order Specific Question:   Estimated length of stay     Answer:   More than 2 Midnights     Order Specific Question:   Certification     Answer:   I certify that inpatient services are medically necessary for this patient for a duration of greater than two midnights  See H&P and MD Progress Notes for additional information about the patient's course of treatment  ED Arrival Information     Expected   -    Arrival   7/29/2022 21:23    Acuity   Urgent            Means of arrival   Walk-In    Escorted by   Self    Service   Hospitalist    Admission type   Urgent            Arrival complaint   Chest pain           Chief Complaint   Patient presents with    Chest Pain     Pt c/o midsternal CP, diaphoresis, and feeling "unsteady/swaying" since today  Initial Presentation: 58 y o  male , presented to the ED @ [facility] from home via walk in  Admitted as Observation due to  Chest Pain  Date: 07/30/2022     PTA  Presented for evaluation of chest pain developing this afternoon  During ED evaluation here troponin x2 were within normal limits and EKG without acute ischemic change however given his history and persistent chest pain     Consult Cardio  Trend & Monitor trops  Telemetry  Trial GI Cocktail    Reassess pain in a m , consider outpatient versus inpatient pharmacologic stress testing (patient stating he cannot complete exercise stress test)  Ongoing for several days per patient, was seen in this hospital's ED 07/28/2022 for this  Hemoglobin stable, has outpatient follow-up with GI 08/10/2022       07/30/2022  Consult Cardio: Chest pain atypical features: has some risk factors for coronary artery disease and has atypical features   -if he continues to have more chest pain I would consider inpatient stress tomorrow but if not it is reasonable to schedule him for nuc lexiscan next week  -continue lower dose BB   -continue asa 81 mg and statin    Dizziness:  Denies room spinning and he describes as a general weakness where he feels like he has to stop and collect himself  On recent loop interrogation he had no atrial fibrillation and on my personal review seems to have lower rates of bradycardia in the 50s  On EKG on presentation the hospital EKG with sinus bradycardia and on telemetry sinus bradycardia in the 40s the 50s  -he does have a history CVA but he has no other clinical signs posterior stroke on exam but should be kept on the differential considering his history  I suspect this dizziness is more iatrogenic from his high does beta-blocker and calcium channel blocker   -he only has a history of paroxysmal atrial fibrillation in the past in 2014 that was just postoperative  He has no atrial fibrillation burden on recent loop recorder monitors   -I would cut his beta-blocker and calcium channel blocker in half to 120 of Cardizem and 75 of Toprol XL    -if he has issues with hypertension we should other medications especially since BB and CCB are not best for HTN and put him at higher risk for conduction disease in the future   -keep on telemetry   -warfarin for pAF and antiphospholipid per primary and outpatient cardiologist         ED Triage Vitals   Temperature Pulse Respirations Blood Pressure SpO2   07/29/22 2131 07/29/22 2133 07/29/22 2133 07/29/22 2133 22   97 5 °F (36 4 °C) 59 17 (!) 181/105 96 %      Temp Source Heart Rate Source Patient Position - Orthostatic VS BP Location FiO2 (%)   22 --   Temporal Monitor Lying Right arm       Pain Score       22       3          Wt Readings from Last 1 Encounters:   22 110 kg (242 lb)     Additional Vital Signs:   Date/Time Temp Pulse Resp BP MAP (mmHg) SpO2 O2 Device Patient Position - Orthostatic VS   22 16:37:39 -- 68 -- 152/85 107 94 % -- --   22 16:36:46 -- 50 Abnormal  -- 141/78 99 94 % -- --   22 16:35:09 -- 51 Abnormal  -- 141/78 99 96 % -- --   22 07:33:14 -- 58 20 164/88 113 94 % -- --   22 05:38:44 98 5 °F (36 9 °C) 56 -- 164/88 113 96 % -- --   22 03:14:05 98 °F (36 7 °C) -- 19 164/88 113 -- -- --   22 0030 -- 54 Abnormal  21 155/70 101 96 % -- Lying   22 0000 -- 50 Abnormal  21 175/81 Abnormal  116 96 % None (Room air) Lying   22 2315 -- 58 20 177/83 Abnormal  119 98 % None (Room air) --     2022 @ 0219  EC, Sinus Bradycardia  Pertinent Labs/Diagnostic Test Results:   XR chest 1 view portable   Final Result by Ruben Escamilla MD ( [id number])      No acute cardiopulmonary disease          Results from last 7 days   Lab Units 22  0135   SARS-COV-2  Negative     Results from last 7 days   Lab Units 22  0453 22  2203 22  1842   WBC Thousand/uL 5 99 6 12 5 96   HEMOGLOBIN g/dL 14 8 14 4 13 7   HEMATOCRIT % 44 2 43 2 41 5   PLATELETS Thousands/uL 167 171 166   NEUTROS ABS Thousands/µL  --  4 15 3 97     Results from last 7 days   Lab Units 22  0453 22  2203 22  1842   SODIUM mmol/L 142 140 140   POTASSIUM mmol/L 3 3* 3 0* 3 3*   CHLORIDE mmol/L 109* 107 109*   CO2 mmol/L 28 28 27   ANION GAP mmol/L 5 5 4   BUN mg/dL 7 10 10   CREATININE mg/dL 1 08 1 18 1 04   EGFR ml/min/1 73sq m 73 65 76   CALCIUM mg/dL 8 7 8 9 9 0 Results from last 7 days   Lab Units 07/29/22  2203 07/28/22  1842   AST U/L 21 19   ALT U/L 30 30   ALK PHOS U/L 135* 125*   TOTAL PROTEIN g/dL 6 7 6 4   ALBUMIN g/dL 3 7 3 3*   TOTAL BILIRUBIN mg/dL 1 89* 1 55*     Results from last 7 days   Lab Units 07/30/22  0553   POC GLUCOSE mg/dl 110     Results from last 7 days   Lab Units 07/30/22  0453 07/29/22  2203 07/28/22  1842   GLUCOSE RANDOM mg/dL 110 101 98       Results from last 7 days   Lab Units 07/30/22  0025 07/29/22  2203   HS TNI 0HR ng/L  --  14   HS TNI 2HR ng/L 16  --    HSTNI D2 ng/L 2  --      Results from last 7 days   Lab Units 07/29/22  2306 07/28/22  1842   PROTIME seconds 26 8* 27 4*   INR  2 44* 2 51*   PTT seconds  --  36     Results from last 7 days   Lab Units 07/28/22  1842   LIPASE u/L 100       Results from last 7 days   Lab Units 07/30/22  0135   INFLUENZA A PCR  Negative   INFLUENZA B PCR  Negative   RSV PCR  Negative     ED Treatment:   Medication Administration from 07/29/2022 2123 to 07/30/2022 0310       Date/Time Order Dose Route Action     07/29/2022 2310 potassium chloride (K-DUR,KLOR-CON) CR tablet 40 mEq 40 mEq Oral Given     07/30/2022 0224 ondansetron (ZOFRAN) injection 4 mg 4 mg Intravenous Given        Past Medical History:   Diagnosis Date    Aneurysm of thoracic aorta ([address] )     last assessed 11/20/17    Anxiety     currently on no meds    Arthritis     Bilateral inguinal hernia     Cardiac disease     Depression     GERD (gastroesophageal reflux disease)     Hearing loss of aging     Hyperlipidemia     Hypertension     Irritable bowel syndrome     Kidney stone     Obesity     Occasional tremors     left arm since stroke    Palpitations     PONV (postoperative nausea and vomiting)     and headache x 3 days    Sciatica     right and left  side    Shortness of breath     on exertion    Sleep apnea     is not using a CPAP    Spitting up blood 05/21/2021    Resolved    Status post placement of implantable loop recorder     Stroke [facility]) 11/2014    Stroke ([address] ) 03/2021    TIA (transient ischemic attack)      Present on Admission:   Chest pain   Atrial fibrillation (HCC)   GERD (gastroesophageal reflux disease)   Hypertension   Bright red blood per rectum   Hypokalemia   Dizziness      Admitting Diagnosis: Chest pain [R07 9]  Age/Sex: 58 y o  male  Admission Orders:    CV Stress Meal  Up & OOB as tolerated  Telemetry  Continuous Cardio-Pulmonary Monitoring  Continuous Pulse Ox  Tate SCDs    Scheduled Medications:  aspirin, 81 mg, Oral, Daily  atorvastatin, 40 mg, Oral, Daily With Dinner  [START ON 7/31/2022] diltiazem, 120 mg, Oral, Daily  famotidine, 20 mg, Oral, Daily  furosemide, 20 mg, Oral, Daily  [START ON 7/31/2022] metoprolol succinate, 75 mg, Oral, Daily  pantoprazole, 40 mg, Oral, BID AC  [START ON 8/1/2022] warfarin, 5 mg, Oral, Once per day on Mon Tue Thu Fri  warfarin, 6 mg, Oral, Once per day on Sun Wed Sat      Continuous IV Infusions:     PRN Meds:  aluminum-magnesium hydroxide-simethicone, 30 mL, Oral, Q4H PRN  nitroglycerin, 0 4 mg, Sublingual, Q5 Min PRN  ondansetron, 4 mg, Intravenous, Q6H PRN        IP CONSULT TO CARDIOLOGY    Network Utilization Review Department  ATTENTION: Please call with any questions or concerns to [phone] and carefully listen to the prompts so that you are directed to the right person  All voicemails are confidential   Dora Mason all requests for admission clinical reviews, approved or denied determinations and any other requests to dedicated fax number below belonging to the campus where the patient is receiving treatment   List of dedicated fax numbers for the Facilities:  [address] [facility] [address] DENIALS (Administrative/Medical Necessity) [phone]   [address] (Maternity/NICU/Pediatrics) [phone]   [address] [phone]   [address] [phone] [address] [facility] [address] [place] [facility] [phone]   [address] [place] [address] [id number] [place] [address] [address]abella King [facility] [address] [facility] [place] [address] [phone]

## 2022-07-30 NOTE — ED PROVIDER NOTES
History  Chief Complaint   Patient presents with    Chest Pain     Pt c/o midsternal CP, diaphoresis, and feeling "unsteady/swaying" since today  80-year-old male with past medical history of hypertension, AFib on Coumadin, hyperlipidemia, CVA x2, antiphospholipid antibody syndrome, bicuspid aortic valve, thoracic aortic aneurysm s/p repair in 2014, GERD presents to the ED for evaluation of chest pain and diaphoresis since 1600 today  States the pain is midsternal, nonradiating, heaviness/pressure in nature  Endorses associated nausea and positional lightheadedness  Has not taken anything for pain  Denies h/o heart attacks  Denies diaphoresis, nausea, and lightheadedness currently  Denies h/o PE/DVT  Per chart review, he was evaluated here for rectal bleeding and diagnosed with diverticulosis and d/c home  Denies fever, chills, cough, runny nose, sore throat, abdominal pain, dysuria, hematuria  Covid vaccinated, never had covid before, denies recent sick contact  Prior to Admission Medications   Prescriptions Last Dose Informant Patient Reported?  Taking?   acetaminophen (TYLENOL) 500 mg tablet  Self Yes No   Sig: Take 500 mg by mouth every 6 (six) hours as needed for mild pain 2 tab   amoxicillin (AMOXIL) 500 mg capsule  Self Yes No   aspirin (ECOTRIN LOW STRENGTH) 81 mg EC tablet  Self No No   Sig: Take 1 tablet (81 mg total) by mouth daily   atorvastatin (LIPITOR) 40 mg tablet  Self No No   Sig: Take 1 tablet (40 mg total) by mouth daily with dinner   diltiazem (CARDIZEM CD) 240 mg 24 hr capsule  Self No No   Sig: Take 1 capsule (240 mg total) by mouth daily   famotidine (PEPCID) 40 MG tablet  Self No No   Sig: TAKE 1 TABLET BY MOUTH EVERY DAY   furosemide (LASIX) 20 mg tablet  Self No No   Sig: Take 1 tablet (20 mg total) by mouth daily   metoprolol succinate (TOPROL-XL) 50 mg 24 hr tablet  Self No No   Sig: Take 3 tablets (150 mg total) by mouth daily   mometasone (ELOCON) 0 1 % cream  Self No No Sig: Apply topically daily   Patient taking differently: Apply topically daily PRN   pantoprazole (PROTONIX) 40 mg tablet  Self No No   Sig: TAKE 1 TABLET BY MOUTH EVERY DAY   warfarin (COUMADIN) 1 mg tablet   No No   Sig: TAKE 1 TABLET DAILY WITH 5MG TABS OR AS DIRECTED   warfarin (COUMADIN) 5 mg tablet  Self No No   Sig: TAKE 1 TABLET BY MOUTH EVERY DAY      Facility-Administered Medications: None       Past Medical History:   Diagnosis Date    Aneurysm of thoracic aorta ([facility] [address] )     last assessed 11/20/17    Anxiety     currently on no meds    Arthritis     Bilateral inguinal hernia     Cardiac disease     Depression     GERD (gastroesophageal reflux disease)     Hearing loss of aging     Hyperlipidemia     Hypertension     Irritable bowel syndrome     Kidney stone     Obesity     Occasional tremors     left arm since stroke    Palpitations     PONV (postoperative nausea and vomiting)     and headache x 3 days    Sciatica     right and left  side    Shortness of breath     on exertion    Sleep apnea     is not using a CPAP    Spitting up blood 05/21/2021    Resolved    Status post placement of implantable loop recorder     Stroke ([facility] [address] ) 11/2014    Stroke ([address] ) 03/2021    TIA (transient ischemic attack)        Past Surgical History:   Procedure Laterality Date    AORTA SURGERY      thoracic - aneurysmorrhaphy    APPENDECTOMY      ASCENDING AORTIC ANEURYSM REPAIR      resolved 8/19/15    CARDIAC LOOP RECORDER      CHOLECYSTECTOMY      laparoscopic    COLONOSCOPY      CYSTOSCOPY      with removal of object- stent removal    KNEE ARTHROSCOPY Right 1996    with medial meniscus repair    LITHOTRIPSY      renal    ORTHOPEDIC SURGERY      KS FRAGMENT KIDNEY STONE/ ESWL Left 5/17/2018    Procedure: LITHROTRIPSY EXTRACORPORAL SHOCKWAVE (ESWL);   Surgeon: Flaquita Arroyo MD;  Location: [facility];  Service: Urology    [place] [address] Bilateral 12/9/2021    Procedure: ROBOTIC REPAIR HERNIA INGUINAL;  Surgeon: James Marks MD;  Location: [facility];  Service: General    THUMB ARTHROSCOPY Right 1976    ligament repair    UPPER GASTROINTESTINAL ENDOSCOPY         Family History   Problem Relation Age of Onset    Diverticulitis Mother         of colon    Nephrolithiasis Mother     Emphysema Father     Nephrolithiasis Sister     Heart attack Maternal Grandfather 72    Breast cancer Paternal Grandmother     Lung cancer Paternal Grandfather     Cancer Family     Coronary artery disease Family     Diabetes Family         sibling    Hypertension Family         sibling    Hernia Family         sibling     I have reviewed and agree with the history as documented  E-Cigarette/Vaping    E-Cigarette Use Never User      E-Cigarette/Vaping Substances    Nicotine No     THC No     CBD No     Flavoring No     Other No     Unknown No      Social History     Tobacco Use    Smoking status: Never Smoker    Smokeless tobacco: Never Used    Tobacco comment: no second hand smoke exposure   Vaping Use    Vaping Use: Never used   Substance Use Topics    Alcohol use: Not Currently    Drug use: No        Review of Systems   Constitutional: Positive for diaphoresis  Negative for chills and fatigue  HENT: Negative for congestion, rhinorrhea and sore throat  Cardiovascular: Positive for chest pain  Gastrointestinal: Positive for nausea  Negative for abdominal pain, diarrhea and vomiting  Genitourinary: Negative for dysuria and hematuria  Neurological: Positive for light-headedness  Negative for dizziness and headaches  All other systems reviewed and are negative        Physical Exam  ED Triage Vitals   Temperature Pulse Respirations Blood Pressure SpO2   07/29/22 2131 07/29/22 2133 07/29/22 2133 07/29/22 2133 07/29/22 2133   97 5 °F (36 4 °C) 59 17 (!) 181/105 96 %      Temp Source Heart Rate Source Patient Position - Orthostatic VS BP Location FiO2 (%)   07/29/22 2131 07/29/22 2133 07/29/22 2133 07/29/22 2133 --   Temporal Monitor Lying Right arm       Pain Score       07/29/22 2131       3             Orthostatic Vital Signs  Vitals:    07/29/22 2133 07/29/22 2315 07/30/22 0000 07/30/22 0030   BP: (!) 181/105 (!) 177/83 (!) 175/81 155/70   Pulse: 59 58 (!) 50 (!) 54   Patient Position - Orthostatic VS: Lying  Lying Lying       Physical Exam  Vitals and nursing note reviewed  Constitutional:       General: He is not in acute distress  Appearance: Normal appearance  He is well-developed  He is not ill-appearing or toxic-appearing  HENT:      Head: Normocephalic and atraumatic  Mouth/Throat:      Mouth: Mucous membranes are moist    Eyes:      Conjunctiva/sclera: Conjunctivae normal    Cardiovascular:      Rate and Rhythm: Regular rhythm  Bradycardia present  Pulses: Normal pulses  Heart sounds: Murmur heard  Crescendo decrescendo systolic murmur is present with a grade of 3/6  Pulmonary:      Effort: Pulmonary effort is normal  No respiratory distress  Breath sounds: Normal breath sounds  No wheezing  Chest:      Chest wall: No tenderness  Abdominal:      Palpations: Abdomen is soft  Tenderness: There is no abdominal tenderness  There is no guarding or rebound  Musculoskeletal:         General: Normal range of motion  Cervical back: Normal range of motion  Skin:     General: Skin is warm and dry  Capillary Refill: Capillary refill takes less than 2 seconds  Neurological:      General: No focal deficit present  Mental Status: He is alert and oriented to person, place, and time           ED Medications  Medications   ondansetron (ZOFRAN) injection 4 mg (has no administration in time range)   potassium chloride (K-DUR,KLOR-CON) CR tablet 40 mEq (40 mEq Oral Given 7/29/22 2310)       Diagnostic Studies  Results Reviewed     Procedure Component Value Units Date/Time    FLU/RSV/COVID - if FLU/RSV clinically relevant [[id number]] Collected: 07/30/22 0135    Lab Status:  In process Specimen: Nares from Nose Updated: 07/30/22 0139    HS Troponin I 2hr [[id number]]  (Normal) Collected: 07/30/22 0025    Lab Status: Final result Specimen: Blood from Arm, Right Updated: 07/30/22 0058     hs TnI 2hr 16 ng/L      Delta 2hr hsTnI 2 ng/L     HS Troponin I 4hr [[id number]] Collected: 07/30/22 0025    Lab Status: No result Specimen: Blood     Protime-INR [[id number]]  (Abnormal) Collected: 07/29/22 2306    Lab Status: Final result Specimen: Blood from Arm, Right Updated: 07/29/22 2339     Protime 26 8 seconds      INR 2 44    HS Troponin 0hr (reflex protocol) [[id number]]  (Normal) Collected: 07/29/22 2203    Lab Status: Final result Specimen: Blood from Arm, Right Updated: 07/29/22 2237     hs TnI 0hr 14 ng/L     Comprehensive metabolic panel [[id number]]  (Abnormal) Collected: 07/29/22 2203    Lab Status: Final result Specimen: Blood from Arm, Right Updated: 07/29/22 2230     Sodium 140 mmol/L      Potassium 3 0 mmol/L      Chloride 107 mmol/L      CO2 28 mmol/L      ANION GAP 5 mmol/L      BUN 10 mg/dL      Creatinine 1 18 mg/dL      Glucose 101 mg/dL      Calcium 8 9 mg/dL      AST 21 U/L      ALT 30 U/L      Alkaline Phosphatase 135 U/L      Total Protein 6 7 g/dL      Albumin 3 7 g/dL      Total Bilirubin 1 89 mg/dL      eGFR 65 ml/min/1 73sq m     Narrative:      [facility] guidelines for Chronic Kid[facility] Disease (CKD):     Stage 1 with normal or high GFR (GFR > 90 mL/min/1 73 square meters)    Stage 2 Mild CKD (GFR = 60-89 mL/min/1 73 square meters)    Stage 3A Moderate CKD (GFR = 45-59 mL/min/1 73 square meters)    Stage 3B Moderate CKD (GFR = 30-44 mL/min/1 73 square meters)    Stage 4 Severe CKD (GFR = 15-29 mL/min/1 73 square meters)    Stage 5 End Stage CKD (GFR <15 mL/min/1 73 square meters)  Note: GFR calculation is accurate only with a steady state creatinine    CBC and differential [[id number]] Collected: 07/29/22 2203    Lab Status: Final result Specimen: Blood from Arm, Right Updated: 07/29/22 2211     WBC 6 12 Thousand/uL      RBC 4 98 Million/uL      Hemoglobin 14 4 g/dL      Hematocrit 43 2 %      MCV 87 fL      MCH 28 9 pg      MCHC 33 3 g/dL      RDW 13 8 %      MPV 9 2 fL      Platelets 324 Thousands/uL      nRBC 0 /100 WBCs      Neutrophils Relative 69 %      Immat GRANS % 0 %      Lymphocytes Relative 20 %      Monocytes Relative 9 %      Eosinophils Relative 2 %      Basophils Relative 0 %      Neutrophils Absolute 4 15 Thousands/µL      Immature Grans Absolute 0 02 Thousand/uL      Lymphocytes Absolute 1 24 Thousands/µL      Monocytes Absolute 0 57 Thousand/µL      Eosinophils Absolute 0 13 Thousand/µL      Basophils Absolute 0 01 Thousands/µL                  XR chest 1 view portable    (Results Pending)         Procedures  ECG 12 Lead Documentation Only    Date/Time: 7/29/2022 10:24 PM  Performed by: Jorge Luis Jaquez MD  Authorized by: Jorge Luis Jaquez MD     Indications / Diagnosis:  Chest pain, diaphoresis  ECG reviewed by me, the ED Provider: yes    Patient location:  ED  Previous ECG:     Previous ECG:  Compared to current    Comparison ECG info:  11/5/21    Similarity:  No change    Comparison to cardiac monitor: Yes    Interpretation:     Interpretation: abnormal    Rate:     ECG rate:  53    ECG rate assessment: bradycardic    Rhythm:     Rhythm: sinus rhythm    QRS:     QRS axis:  Normal    QRS intervals:  Normal  ST segments:     ST segments:  Normal  T waves:     T waves: normal            ED Course  ED Course as of 07/30/22 0219   Fri Jul 29, 2022   2246 Potassium(!): 3 0   2246 hs TnI 0hr: 14   2323 On reassessment, he endorses mild chest heaviness  Updated pt on labs, cxr, ekg  Sat Jul 30, 2022   0015 Reached out to [facility] about admission      0110 Delta 2hr hsTnI: 2             HEART Risk Score    Flowsheet Row Most Recent Value   Heart Score Risk Calculator    History 1 Filed at: 07/29/2022 2224   ECG 0 Filed at: 07/29/2022 2224   Age 1 Filed at: 07/29/2022 2224   Risk Factors 2 Filed at: 07/29/2022 2224   Troponin 1 Filed at: 07/29/2022 2224   HEART Score 5 Filed at: 07/29/2022 2224                        MIGUELITO Risk Score    Flowsheet Row Most Recent Value   Age >= 72 0 Filed at: 07/30/2022 9171   Known CAD (stenosis >= 50%) 0 Filed at: 07/30/2022 1995   Recent (<=24 hrs) Service Angina 0 Filed at: 07/30/2022 0018   ST Deviation >= 0 5 mm 0 Filed at: 07/30/2022 0018   3+ CAD Risk Factors (FHx, HTN, HLP, DM, Smoker) 1 Filed at: 07/30/2022 0018   Aspirin Use Past 7 Days 1 Filed at: 07/30/2022 0018   Elevated Cardiac Markers 0 Filed at: 07/30/2022 0018   MIGUELITO Risk Score (Calculated) 2 Filed at: 07/30/2022 0018              MDM  Number of Diagnoses or Management Options  Chest pain  Diagnosis management comments: 24-year-old male with past medical history of hypertension, AFib on Coumadin, hyperlipidemia, CVA x2, antiphospholipid antibody syndrome, bicuspid aortic valve, thoracic aortic aneurysm s/p repair in 2014, GERD presents for evaluation of chest pain and diaphoresis since 1600 today  Differential diagnosis:   Included but not limited to ACS, PE, GERD, thoracic aortic aneurysm dissection     Vitally stable  No acute distress  CBC within normal limits  CMP shows hypokalemia with K of 3, replete with 40 mEq of KCl  CXR within normal limits  Delta trop 2  Sinus bradycardia on EKG  PT/INR within goal  HEART score 5  Discussed admission with patient and he is agreeable  Admitted to [facility]         Disposition  Final diagnoses:   Chest pain     Time reflects when diagnosis was documented in both MDM as applicable and the Disposition within this note     Time User Action Codes Description Comment    7/30/2022  1:14 AM Greer Comas T Add [R07 9] Chest pain       ED Disposition     ED Disposition   Admit    Condition   Stable    Date/Time   Sat Jul 30, 2022  1:13 AM    Comment   Case was discussed with Dr Tirso Lin and the patient's admission status was agreed to be Admission Status: observation status to the service of Dr Tirso Lin   Follow-up Information    None         Patient's Medications   Discharge Prescriptions    No medications on file     No discharge procedures on file  PDMP Review     None           ED Provider  Attending physically available and evaluated Nima Lucero  CHASE managed the patient along with the ED Attending      Electronically Signed by         Sarah Sifuentes MD  07/30/22 5193

## 2022-07-30 NOTE — CONSULTS
Consultation - General Cardiology Team 2  Brianna Fried 58 y o  male MRN: [id number]  Unit/Bed#: CW2 216-02 Encounter: [id number]      Consults  PCP: Alaina Villa MD   Outpatient Cardiologist: Joanna Davis    History of Present Illness   Physician Requesting Consult: Ashanti Martinez MD  Reason for Consult / Principal Problem: dizziness and chest pain    HPI: Johnny Tubbs is a 58y o  year old male with a history of  61 YO male with a significant history of ascending aortic aneurysm, status post open replacement of the ascending aorta with matthew arch reconstruction on 11/06/2014   Preoperative cardiac catheterization was unremarkable   Postoperatively, he had transient atrial fibrillation, which converted to sinus rhythm on amiodarone/metoprolol, patient also has a history of lacunar infarcts and 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 presenting yesterday with chest pain and dizziness  The patient states that he was at the [employer] and walking to his car when he developed central chest pain that he rated as a 6/10 on the pain scale  The pain did not radiate but was associated with diaphoresis  He never had this pain before and this is different than his GERD like symptoms that he has had in the past   Nothing made the pain better or worse which prompted and come to the emergency room  On presentation and overnight he had negative troponins and nonischemic EKGs  His EKG shows sinus bradycardia  Overnight his chest pain self resolved around 3:00 a m  He has had slight episodes of chest pain that he rates as 1/10  He had a nuclear stress test in 2019 for atypical chest pain that was negative  He is unsure if this episode chest pain is similar to the chest pains that he had back in 2019  He has no other associated symptoms currently and Denies fevers/chills, nausea/vomiting, abdominal pain, diarrhea, cough, chest pain, shortness of breath, PND, orthopnea, presyncopal symptoms, syncopal episodes      In regards to his dizziness he states that this has been going on for the past month  He denies room spinning and describes a sensation where he has to stopping gather himself when the dizziness occurs  He has never passed out from the dizziness over the past month  He has not contacted anyone about the dizziness recently  He has not had any other associated symptoms with the dizziness and has not had any chest pains over the past month  Review of Systems  Review of system was conducted and was negative except for as stated in the HPI        Historical Information   Past Medical History:   Diagnosis Date    Aneurysm of thoracic aorta ([facility] )     last assessed 11/20/17    Anxiety     currently on no meds    Arthritis     Bilateral inguinal hernia     Cardiac disease     Depression     GERD (gastroesophageal reflux disease)     Hearing loss of aging     Hyperlipidemia     Hypertension     Irritable bowel syndrome     Kidney stone     Obesity     Occasional tremors     left arm since stroke    Palpitations     PONV (postoperative nausea and vomiting)     and headache x 3 days    Sciatica     right and left  side    Shortness of breath     on exertion    Sleep apnea     is not using a CPAP    Spitting up blood 05/21/2021    Resolved    Status post placement of implantable loop recorder     [facility]    [facility] 03/2021    TIA (transient ischemic attack)      Past Surgical History:   Procedure Laterality Date    AORTA SURGERY      thoracic - aneurysmorrhaphy    APPENDECTOMY      ASCENDING AORTIC ANEURYSM REPAIR      resolved 8/19/15    CARDIAC LOOP RECORDER      CHOLECYSTECTOMY      laparoscopic    COLONOSCOPY      CYSTOSCOPY      with removal of object- stent removal    KNEE ARTHROSCOPY Right 1996    with medial meniscus repair    LITHOTRIPSY      renal    ORTHOPEDIC SURGERY      TN FRAGMENT KIDNEY STONE/ ESWL Left 5/17/2018    Procedure: Isha Reis SHOCKWAVE (ESWL);   Surgeon: Epifanio Ayala MD;  [facility];  Service: Urology    RI Ameena Ray[address] Bilateral 12/9/2021    Procedure: ROBOTIC REPAIR HERNIA INGUINAL;  Surgeon: Facundo Apple MD;  Location: [facility];  Service: General    THUMB ARTHROSCOPY Right 1976    ligament repair    UPPER GASTROINTESTINAL ENDOSCOPY       Social History     Substance and Sexual Activity   Alcohol Use Not Currently     Social History     Substance and Sexual Activity   Drug Use No     Social History     Tobacco Use   Smoking Status Never Smoker   Smokeless Tobacco Never Used   Tobacco Comment    no second hand smoke exposure     Family History: non-contributory    Meds/Allergies   Hospital Medications:   Current Facility-Administered Medications   Medication Dose Route Frequency    aluminum-magnesium hydroxide-simethicone (MYLANTA) oral suspension 30 mL  30 mL Oral Q4H PRN    aspirin (ECOTRIN LOW STRENGTH) EC tablet 81 mg  81 mg Oral Daily    atorvastatin (LIPITOR) tablet 40 mg  40 mg Oral Daily With Dinner    [START ON 7/31/2022] diltiazem (CARDIZEM CD) 24 hr capsule 120 mg  120 mg Oral Daily    famotidine (PEPCID) tablet 20 mg  20 mg Oral Daily    furosemide (LASIX) tablet 20 mg  20 mg Oral Daily    [START ON 7/31/2022] metoprolol succinate (TOPROL-XL) 24 hr tablet 75 mg  75 mg Oral Daily    nitroglycerin (NITROSTAT) SL tablet 0 4 mg  0 4 mg Sublingual Q5 Min PRN    ondansetron (ZOFRAN) injection 4 mg  4 mg Intravenous Q6H PRN    pantoprazole (PROTONIX) EC tablet 40 mg  40 mg Oral Early Morning    [START ON 8/1/2022] warfarin (COUMADIN) tablet 5 mg  5 mg Oral Once per day on Mon Tue Thu Fri    warfarin (COUMADIN) tablet 6 mg  6 mg Oral Once per day on Sun Wed Sat     Home Medications:   Medications Prior to Admission   Medication    acetaminophen (TYLENOL) 500 mg tablet    atorvastatin (LIPITOR) 40 mg tablet    diltiazem (CARDIZEM CD) 240 mg 24 hr capsule    famotidine (PEPCID) 40 MG tablet    furosemide (LASIX) 20 mg tablet    metoprolol succinate (TOPROL-XL) 50 mg 24 hr tablet    pantoprazole (PROTONIX) 40 mg tablet    warfarin (COUMADIN) 5 mg tablet    amoxicillin (AMOXIL) 500 mg capsule    aspirin (ECOTRIN LOW STRENGTH) 81 mg EC tablet    mometasone (ELOCON) 0 1 % cream    warfarin (COUMADIN) 1 mg tablet       Allergies   Allergen Reactions    Losartan Angioedema    Aspirin Fatigue     Due to blood thinners      Bactrim [Sulfamethoxazole-Trimethoprim]     Eliquis [Apixaban] Other (See Comments)     Failed  Had embolic CVA    Lisinopril      Felt bad, was OK with Zestril brand name   Tramadol        Objective   Vitals: Blood pressure 164/88, pulse 58, temperature 98 5 °F (36 9 °C), resp  rate 20, height 5' 8" (1 727 m), weight 110 kg (242 lb), SpO2 94 %  Orthostatic Blood Pressures    Flowsheet Row Most Recent Value   Blood Pressure 164/88 filed at 07/30/2022 9660   Patient Position - Orthostatic VS Lying filed at 07/30/2022 0030            Invasive Devices  Report    Peripheral Intravenous Line  Duration           Peripheral IV 07/29/22 Right Antecubital <1 day                Physical Exam    GEN: Gayle Roman appears well, alert and oriented x 3, pleasant and cooperative   HEENT:  Normocephalic, atraumatic, anicteric, moist mucous membranes  NECK: No JVD or carotid bruits   HEART: regular rhythm, regular rate, normal S1 and S2, no murmurs, clicks, gallops or rubs   LUNGS: Clear to auscultation bilaterally; no wheezes, rales, or rhonchi; respiration nonlabored   ABDOMEN:  Normoactive bowel sounds, soft, no tenderness, no distention  EXTREMITIES: peripheral pulses palpable; no edema  NEURO: no gross focal findings; cranial nerves grossly intact   SKIN:  Dry, intact, warm to touch    Lab Results: I have personally reviewed pertinent lab results      Results from last 7 days   Lab Units 07/30/22  0025 07/29/22  2203   HS TNI 0HR ng/L  --  14   HS TNI 2HR ng/L 16  --          Results from last 7 days   Lab Units 22  0453 22  2203 22  1842   POTASSIUM mmol/L 3 3* 3 0* 3 3*   CO2 mmol/L    CHLORIDE mmol/L 109* 107 109*   BUN mg/dL 7 10 10   CREATININE mg/dL 1 08 1 18 1 04     Results from last 7 days   Lab Units 22  0453 223 22  1842   HEMOGLOBIN g/dL 14 8 14 4 13 7   HEMATOCRIT % 44 2 43 2 41 5   PLATELETS Thousands/uL 167 171 166             Imaging: I have personally reviewed pertinent reports  Telemetry:   Sinus bradycardia in the 40's    EKG:   Sinus bradycardia    Previous STRESS TEST:  No results found for this or any previous visit  No results found for this or any previous visit  Results for orders placed during the hospital encounter of 02/15/19    NM myocardial perfusion spect (rx stress and/or rest)    Narrative  [address] [place] [address], [address]  [phone]    Stress Gated SPECT Myocardial Perfusion Imaging after Regadenoson    Patient: Johann Shelton  MR number: [id number]  Account number: [de-identified]  : 1960  Age: 61 years  Gender: Male  Status: Outpatient  Location: Stress lab  Height: 68 in  Weight: 260 lb  BP: 202/ 101 mmHg    Allergies: SULFAMETHOXAZOLE-TRIMETHOPRIM, LISINOPRIL, TRAMADOL    Diagnosis: R06 00 - Dyspnea, unspecified, R07 9 - Chest pain, unspecified    Primary Physician: Cj Childs MD  Technician:  Don Titus  Referring Physician:  Ace Khan DO  Group:  [facility]  Report Prepared By[de-identified]  Maurilio Schmid  Interpreting Physician:  Mehran Quispe MD    INDICATIONS: Coronary artery disease  HISTORY: The patient is a 61year old  female  Chest pain status: chest pain  Other symptoms: dyspnea  Coronary artery disease risk factors: hypertension  Cardiovascular history: AAA repair, stroke   Medications: a beta blocker, an  ACE inhibitor/ARB, aspirin, and a lipid lowering agent     PHYSICAL EXAM: Baseline physical exam screening: no wheezes audible  REST ECG: Normal sinus rhythm  Nonspecific ST-T wave changes  PROCEDURE: The study was performed in the the Stress lab  A regadenoson infusion pharmacologic stress test was performed  Gated SPECT myocardial perfusion imaging was performed during stress  Systolic blood pressure was 202 mmHg, at the  start of the study  Diastolic blood pressure was 101 mmHg, at the start of the study  The heart rate was 61 bpm, at the start of the study  IV double checked  Regadenoson protocol:  HR bpm SBP mmHg DBP mmHg Symptoms  Baseline 61 202 101 none  Immediate 129 206 112 dyspnea, dizziness, nausea  2 min 88 196 100 subsiding  4 min 81 184 100 none  No medications or fluids given  The patient also performed low level exercise  STRESS SUMMARY: Duration of pharmacologic stress was 3 min  There was no chest pain during stress  The stress test was terminated due to protocol completion  Pre oxygen saturation: 99 %  Peak oxygen saturation: 99 %  The stress ECG was  negative for ischemia and normal  There were no stress arrhythmias or conduction abnormalities  ISOTOPE ADMINISTRATION:  Resting isotope administration Stress isotope administration  Agent Tetrofosmin Tetrofosmin  Dose 15 4 mCi 49 4 mCi    The radiopharmaceutical was injected at the peak effect of pharmacologic stress  MYOCARDIAL PERFUSION IMAGING:  The image quality was good  Left ventricular size was normal  The TID ratio was 0 94  PERFUSION DEFECTS:  -  There were no perfusion defects  GATED SPECT:  The calculated left ventricular ejection fraction was 52 %  Left ventricular ejection fraction was within normal limits by visual estimate  There was no left ventricular regional abnormality  SUMMARY:  -  Stress results: There was no chest pain during stress  -  ECG conclusions: The stress ECG was negative for ischemia and normal   -  Perfusion imaging:  There were no perfusion defects   -  Gated SPECT: The calculated left ventricular ejection fraction was 52 %  Left ventricular ejection fraction was within normal limits by visual estimate  There was no left ventricular regional abnormality  IMPRESSIONS: Normal study after vasodilation and low level exercise  Myocardial perfusion imaging was normal at rest and with stress  Prepared and signed by    Jimmy Anaya MD  Signed 02/15/2019 15:39:39      Previous Cath/PCI:  No results found for this or any previous visit  No results found for this or any previous visit  No results found for this or any previous visit  ECHO:  Results for orders placed during the hospital encounter of 20    Echo complete with contrast if indicated    Narrative  [address]  [facility], [address] [facility]  [phone]    Transthoracic Echocardiogram  2D, M-mode, Doppler, and Color Doppler    Study date:  13-Mar-2020    Patient: Steven Ramirez  MR number: [id number]  Account number: [de-identified]  : 1960  Age: 61 years  Gender: Male  Status: Inpatient  Location: Bedside  Height: 68 in  Weight: 247 5 lb  BP: 142/ 92 mmHg    Indications: TIA  Diagnoses: G45 9 - Transient cerebral ischemic attack, unspecified    Sonographer:  Milagros Hernandez  Primary Physician: Jatin Gutierrez MD  Referring Physician:  Nyasia Rosenthal MD  Group:  [facility]  Cardiology Fellow:  Tayler Viveros MD  Interpreting Physician:  Keanu Ascencio MD    SUMMARY    PROCEDURE INFORMATION:  This was a technically difficult study  LEFT VENTRICLE:  Systolic function was normal  Ejection fraction was estimated to be 60 %  There were no regional wall motion abnormalities  Wall thickness was mildly increased  The changes were consistent with concentric remodeling (increased wall thickness with normal wall mass)    Doppler parameters were consistent with abnormal left ventricular relaxation (grade 1 diastolic dysfunction)  VENTRICULAR SEPTUM:  There was mild dyssynergic motion  These changes are consistent with a post-thoracotomy state  RIGHT VENTRICLE:  The ventricle was at least mildly dilated  Wall thickness was increased  Not well visualized  TRICUSPID VALVE:  There was trace regurgitation  HISTORY: PRIOR HISTORY: HTN;A-fib;Microangiopathy;TIA; Aortic aneurysm repair;HLD;GERD; Bicuspid AV with mild AS  PROCEDURE: The procedure was performed at the bedside  This was a routine study  The transthoracic approach was used  The study included complete 2D imaging, M-mode, complete spectral Doppler, and color Doppler  The heart rate was 70 bpm,  at the start of the study  Echocardiographic views were limited due to poor acoustic window availability and decreased penetration  This was a technically difficult study  LEFT VENTRICLE: Size was normal  Systolic function was normal  Ejection fraction was estimated to be 60 %  There were no regional wall motion abnormalities  Wall thickness was mildly increased  The changes were consistent with concentric  remodeling (increased wall thickness with normal wall mass)  DOPPLER: Doppler parameters were consistent with abnormal left ventricular relaxation (grade 1 diastolic dysfunction)  VENTRICULAR SEPTUM: There was mild dyssynergic motion  These changes are consistent with a post-thoracotomy state  RIGHT VENTRICLE: The ventricle was at least mildly dilated  Systolic function was normal  Wall thickness was increased  Not well visualized  LEFT ATRIUM: Not well visualized  RIGHT ATRIUM: Not well visualized  MITRAL VALVE: Valve structure was normal  There was normal leaflet separation  DOPPLER: The transmitral velocity was within the normal range  There was no evidence for stenosis  There was no significant regurgitation  AORTIC VALVE: The valve was probably trileaflet  Leaflets exhibited sclerosis  The valve was not well visualized  DOPPLER: Transaortic velocity was minimally increased  There was no evidence for stenosis  There was no significant  regurgitation  TRICUSPID VALVE: The valve structure was normal  There was normal leaflet separation  DOPPLER: The transtricuspid velocity was within the normal range  There was no evidence for stenosis  There was trace regurgitation  Estimated peak PA  pressure was 35 mmHg  PULMONIC VALVE: Not well visualized  DOPPLER: The transpulmonic velocity was within the normal range  There was trace regurgitation  PERICARDIUM: There was no pericardial effusion  A pericardial fat pad was present  The pericardium was normal in appearance  AORTA: The root exhibited normal size  SYSTEMIC VEINS: IVC: The inferior vena cava was not well visualized  SYSTEM MEASUREMENT TABLES    2D  %FS: 28 46 %  Ao Diam: 3 24 cm  EDV(Teich): 81 53 ml  EF(Teich): 55 23 %  ESV(Teich): 36 5 ml  IVSd: 1 68 cm  LA Area: 14 03 cm2  LA Diam: 4 07 cm  LVEDV MOD A4C: 148 34 ml  LVEF MOD A4C: 51 71 %  LVESV MOD A4C: 71 63 ml  LVIDd: 4 27 cm  LVIDs: 3 05 cm  LVLd A4C: 9 36 cm  LVLs A4C: 7 45 cm  LVOT Diam: 1 83 cm  LVPWd: 1 71 cm  RA Area: 12 84 cm2  RVIDd: 2 42 cm  SV MOD A4C: 76 71 ml  SV(Teich): 45 03 ml    CW  AV Env  Ti: 272 2 ms  AV VTI: 42 59 cm  AV Vmax: 1 9 m/s  AV Vmean: 1 56 m/s  AV maxP 49 mmHg  AV meanPG: 10 37 mmHg  TR Vmax: 3 21 m/s  TR maxP 29 mmHg    MM  TAPSE: 1 09 cm    PW  KASI (VTI): 1 34 cm2  KASI Vmax: 1 17 cm2  E': 0 05 m/s  E/E': 10 53  LVOT Env  Ti: 346 02 ms  LVOT VTI: 21 77 cm  LVOT Vmax: 0 85 m/s  LVOT Vmean: 0 63 m/s  LVOT maxP 89 mmHg  LVOT meanP 73 mmHg  LVSI Dopp: 25 43 ml/m2  LVSV Dopp: 56 95 ml  MV A Qasim: 0 72 m/s  MV Dec Benzie: 1 69 m/s2  MV DecT: 303 35 ms  MV E Qasim: 0 51 m/s  MV E/A Ratio: 0 71  MV PHT: 87 97 ms  MVA By PHT: 2 5 cm2    Intersocietal Commission Accredited Echocardiography Laboratory    Prepared and electronically signed by    Maritza Villalobos MD  Signed 13-Mar-2020 15:01:52    No results found for this or any previous visit  NGUYỄN:  Results for orders placed during the hospital encounter of 21    NGUYỄN    Narrative  [address]  [facility] [facility]  [phone]    Transesophageal Echocardiogram  2D, 3D, M-mode, Doppler, and Color Doppler    Study date:  03-May-2021    Patient: Enio Coon  MR number: [id number]  Account number: [de-identified]  : 1960  Age: 64 years  Gender: Male  Status: Inpatient  Location: Echo lab  Height: 68 in  Weight: 260 lb  BP: 146/ 82 mmHg    Indications: Transient ischemic attack; Aortic aneurysm    Diagnoses: G45 9 - Transient cerebral ischemic attack, unspecified    Sonographer:  Steven Almonte  Interpreting Physician:  Valeriano Wallace MD  Primary Physician: Sorin Tolentino MD  Referring Physician:  Rolando Phan MD  [facility]  [facility]  Cardiology Fellow:  Josh Marks MD  RN:  Jatin Nichols RN    SUMMARY    LEFT VENTRICLE:  Systolic function was normal  Ejection fraction was estimated to be 60 %  Although no diagnostic regional wall motion abnormality was identified, this possibility cannot be completely excluded on the basis of this study  Wall thickness was mildly increased  There was mild concentric hypertrophy  LEFT ATRIAL APPENDAGE:  No thrombus was identified  ATRIAL SEPTUM:  No defect or patent foramen ovale was identified  MITRAL VALVE:  There was mild regurgitation  AORTIC VALVE:  The valve was bicuspid with right and left coronary cusp fusion  Leaflets exhibited moderately increased thickness  Transaortic velocity was increased due to valvular stenosis  There was mild stenosis  Valve mean gradient was 13 mmHg  Estimated aortic valve area (by Vmax) was 1 7 cmï¾²  TRICUSPID VALVE:  There was mild regurgitation  HISTORY: PRIOR HISTORY: Aortic aneurysm repair; Hyperlipidemia;  Hypertension; GERD; Atrial fibrillation; CVA; CORIE    PROCEDURE: The procedure was performed in the echo lab  This was a routine study  The risks and alternatives of the procedure were explained to the patient and informed consent was obtained  The transesophageal approach was used  The study  included complete 2D imaging, 3D imaging, M-mode, complete spectral Doppler, and color Doppler  The heart rate was 79 bpm, at the start of the study  An adult omniplane probe was inserted by the cardiology fellow under direct supervision  of the attending cardiologist  Intubated with ease  One intubation attempt(s)  There was no blood detected on the probe  Intravenous contrast ( 20cc agitated saline) was administered to evaluate intracardiac shunting  Image quality was  adequate  There were no complications during the procedure  MEDICATIONS: Anesthesia administered by anesthesia team     LEFT VENTRICLE: Size was normal  Systolic function was normal  Ejection fraction was estimated to be 60 %  Although no diagnostic regional wall motion abnormality was identified, this possibility cannot be completely excluded on the basis  of this study  Wall thickness was mildly increased  There was mild concentric hypertrophy  DOPPLER: Left ventricular diastolic function parameters were normal     RIGHT VENTRICLE: The size was normal  Systolic function was normal  Wall thickness was normal     LEFT ATRIUM: Size was normal  No thrombus was identified  APPENDAGE: The size was normal  No thrombus was identified  DOPPLER: The function was normal (normal emptying velocity)  ATRIAL SEPTUM: No defect or patent foramen ovale was identified  RIGHT ATRIUM: Size was normal  No thrombus was identified  MITRAL VALVE: Valve structure was normal  There was normal leaflet separation  DOPPLER: There was mild regurgitation  AORTIC VALVE: The valve was bicuspid with right and left coronary cusp fusion  Leaflets exhibited moderately increased thickness   DOPPLER: Transaortic velocity was increased due to valvular stenosis  There was mild stenosis  There was no  significant regurgitation  TRICUSPID VALVE: The valve structure was normal  There was normal leaflet separation  DOPPLER: There was mild regurgitation  PULMONIC VALVE: DOPPLER: There was no significant regurgitation  PERICARDIUM: There was no pericardial effusion  The pericardium was normal in appearance  AORTA: The root exhibited normal size  There was no atheroma  There was no evidence for dissection  There was no evidence for aneurysm  MEASUREMENT TABLES    2D MEASUREMENTS  LVOT   (Reference normals)  Diam   23 mm   (--)  Aortic valve   (Reference normals)  Area sys, plan   1 8 cmï¾²   (--)    DOPPLER MEASUREMENTS  LVOT   (Reference normals)  Peak sonia   99 cm/s   (--)  Mean sonia   60 cm/s   (--)  VTI   21 cm   (--)  Peak gradient   4 mmHg   (--)  Mean gradient   1 4 mmHg   (--)  Stroke vol   87 25 ml   (--)  Aortic valve   (Reference normals)  Peak sonia   243 cm/s   (--)  Mean sonia   178 cm/s   (--)  VTI   52 cm   (--)  Peak gradient   23 62 mmHg   (--)  Mean gradient   13 mmHg   (--)  Obstr index, VTI   0 4    (--)  Valve area, VTI   1 66 cmï¾²   (--)  Area index, VTI   0 72 cmï¾²/mï¾²   (--)  Obstr index, Vmax   0 41    (--)  Valve area, Vmax   1 7 cmï¾²   (--)  Area index, Vmax   0 74 cmï¾²/mï¾²   (--)  Obstr index, Vmean   0 34    (--)  Valve area, Vmean   1 41 cmï¾²   (--)  Area index, Vmean   0 62 cmï¾²/mï¾²   (--)    In[employer] Commission Accredited Echocardiography Laboratory    Prepared and electronically signed by    Mer Osuna MD  Signed [id number] 15:49:23    No results found for this or any previous visit  CMR:  No results found for this or any previous visit  No results found for this or any previous visit  No results found for this or any previous visit        HOLTER  Results for orders placed during the hospital encounter of 10/30/17    Holter monitor - 24 hour    Narrative  INDICATIONS: afib    DESCRIPTION OF FINDINGS:  The patient was monitored for a total of 24 hours  The patient was predominantly noted to be in NSR throughout the study  The average heart rate was 64 beats per minute  The heart rate ranged from a low of 47 beats per minute in AM at 3:12 to a maximum of 108 beats per minute in PM at 6:35  Ventricular ectopic activity consisted of 46 (0 0% of total beats)  There was no sustained or nonsustained ventricular tachycardia  Supraventricular ectopic activity consisted of 39 (0 0% of total beats)  There was no supraventricular tachycardia identified  There was no evidence of atrial fibrillation or atrial flutter  There were no significant pauses  The longest R-R interval was 1 5 seconds  There was no evidence of advanced degree heart block  The accompanying patient diary notes symptoms of palpitations and chest tightness  Correlation with the tracings at these times reveals NSR  Impression  1  Predominantly NSR at varying rates  2    Low density of PVC's  3    Low density of PAC's  4    No significant bradycardia  5    Symptoms as noted correlated with NSR  No results found for this or any previous visit  VTE Prophylaxis: Sequential compression device Roxann Grace)        Assessment/Plan   Chriss Nelson is a 58y o  year old male with a history of  61 YO male with a significant history of ascending aortic aneurysm, status post open replacement of the ascending aorta with matthew arch reconstruction on 11/06/2014   Preoperative cardiac catheterization was unremarkable   Postoperatively, he had transient atrial fibrillation, which converted to sinus rhythm on amiodarone/metoprolol, patient also has a history of lacunar infarcts and 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 presenting yesterday with chest pain and dizziness      Chest pain atypical features: has some risk factors for coronary artery disease and has atypical features   -if he continues to have more chest pain I would consider inpatient stress tomorrow but if not it is reasonable to schedule him for nuc lexiscan next week   -continue lower dose BB  -continue asa 81 mg and statin     Dizziness:  Denies room spinning and he describes as a general weakness where he feels like he has to stop and collect himself  On recent loop interrogation he had no atrial fibrillation and on my personal review seems to have lower rates of bradycardia in the 50s  On EKG on presentation the hospital EKG with sinus bradycardia and on telemetry sinus bradycardia in the 40s the 50s  -he does have a history CVA but he has no other clinical signs posterior stroke on exam but should be kept on the differential considering his history  I suspect this dizziness is more iatrogenic from his high does beta-blocker and calcium channel blocker   -he only has a history of paroxysmal atrial fibrillation in the past in 2014 that was just postoperative  He has no atrial fibrillation burden on recent loop recorder monitors  -I would cut his beta-blocker and calcium channel blocker in half to 120 of Cardizem and 75 of Toprol XL  -if he has issues with hypertension we should other medications especially since BB and CCB are not best for HTN and put him at higher risk for conduction disease in the future   -keep on telemetry overnight   -warfarin for pAF and antiphospholipid per primary and outpatient cardiologist       Case discussed and reviewed with Dr Vijay Romero who agrees with my assessment and plan  Thank you for involving us in the care of your patient  Payam Richey MD  Cardiology Fellow PGY-5    ==========================================================================================    Counseling / Coordination of Care  Total floor / unit time spent today 45 minutes minutes    Greater than 50% of total time was spent with the patient and / or family counseling and / or coordination of care   A description of the counseling / coordination of care:          Epic/ Allscripts/Care Everywhere records reviewed:     ** Please Note: Fluency DirectDictation voice to text software may have been used in the creation of this document   **

## 2022-07-30 NOTE — ASSESSMENT & PLAN NOTE
Pt reports "dizziness" since change of medications  · States he was transitioned from norvasc to diltiazem   · High dose metoprolol   · Discussed with cardiology will decreased diltiazem and metoprolol dose   · Monitor symptoms   · Did request orthostatic Thank you CHUY Willoughby for referring this very pleasant patient to my practice and for allowing me to participate in her care.

## 2022-07-30 NOTE — PLAN OF CARE
Problem: Potential for Falls  Goal: Patient will remain free of falls  Description: INTERVENTIONS:  - Educate patient/family on patient safety including physical limitations  - Instruct patient to call for assistance with activity   - Consult OT/PT to assist with strengthening/mobility   - Keep Call bell within reach  - Keep bed low and locked with side rails adjusted as appropriate  - Keep care items and personal belongings within reach  - Initiate and maintain comfort rounds  - Make Fall Risk Sign visible to staff  - Obtain necessary fall risk management equipment:   - Apply yellow socks and bracelet for high fall risk patients  - Consider moving patient to room near nurses station  Outcome: Progressing     Problem: MOBILITY - ADULT  Goal: Maintain or return to baseline ADL function  Description: INTERVENTIONS:  -  Assess patient's ability to carry out ADLs; assess patient's baseline for ADL function and identify physical deficits which impact ability to perform ADLs (bathing, care of mouth/teeth, toileting, grooming, dressing, etc )  - Assess/evaluate cause of self-care deficits   - Assess range of motion  - Assess patient's mobility; develop plan if impaired  - Assess patient's need for assistive devices and provide as appropriate  - Encourage maximum independence but intervene and supervise when necessary  - Involve family in performance of ADLs  - Assess for home care needs following discharge   - Consider OT consult to assist with ADL evaluation and planning for discharge  - Provide patient education as appropriate  Outcome: Progressing     Problem: CARDIOVASCULAR - ADULT  Goal: Absence of cardiac dysrhythmias or at baseline rhythm  Description: INTERVENTIONS:  - Continuous cardiac monitoring, vital signs, obtain 12 lead EKG if ordered  - Administer antiarrhythmic and heart rate control medications as ordered  - Monitor electrolytes and administer replacement therapy as ordered  Outcome: Progressing

## 2022-07-30 NOTE — ASSESSMENT & PLAN NOTE
· MIGUELITO 2, likely atypical pain   · Presenting with ongoing substernal chest pain not palpable  · Possible GI in nature versus secondary to elevated blood pressure verses anxiety  · Pt takes PPi will increase to bid for now   · Follow-up troponin are negative ,   · monitor on telemetry  · Reassess pain - discussed with cardiology will monitor over night as pt with symptoms of "dizziness"

## 2022-07-30 NOTE — ASSESSMENT & PLAN NOTE
· Rate controlled on metoprolol succinate and Cardizem CD, continue home doses  · Anticoagulated on Coumadin and therapeutic

## 2022-07-30 NOTE — PROGRESS NOTES
[address] [facility]  Progress Note - Jourdan Morillo 1960, 58 y o  male MRN: [id number]  Unit/Bed#: CW2 216-02 Encounter: [id number]  Primary Care Provider: Ladi Monterroso MD   Date and time admitted to hospital: 7/29/2022  9:35 PM    * Chest pain  Assessment & Plan  · MIGUELITO 2, likely atypical pain   · Presenting with ongoing substernal chest pain not palpable  · Possible GI in nature versus secondary to elevated blood pressure verses anxiety  · Pt takes PPi will increase to bid for now   · Follow-up troponin are negative ,   · monitor on telemetry  · Reassess pain - discussed with cardiology will monitor over night as pt with symptoms of "dizziness"     Dizziness  Assessment & Plan  Pt reports "dizziness" since change of medications  · States he was transitioned from norvasc to diltiazem   · High dose metoprolol   · Discussed with cardiology will decreased diltiazem and metoprolol dose   · Monitor symptoms   · Did request orthostatic     Hypokalemia  Assessment & Plan  · Hypokalemic on admission  · Replete potassium   · Repeat BMP in a m      History of stroke  Assessment & Plan  · History of CVA, without current focal deficits  · Continue Coumadin, statin  · Pt is therepeutic     Bright red blood per rectum  Assessment & Plan  · Ongoing for several days per patient, was seen in this hospital's ED 07/28/2022 for this  · Hemoglobin stable, has outpatient follow-up with GI 08/10/2022  · If worsens while inpatient or patient expresses concerns can discuss with GI    Atrial fibrillation ([facility] [address] )  Assessment & Plan  · Rate controlled on metoprolol succinate and Cardizem CD, continue home doses  · Anticoagulated on Coumadin and therapeutic    GERD (gastroesophageal reflux disease)  Assessment & Plan  · Continue PPI increase to bid     Hypertension  Assessment & Plan  · Blood pressure elevated on presentation, slowly improving  · Resume home antihypertensives in a m , monitor blood pressure per protocol        VTE Pharmacologic Prophylaxis: VTE Score: 6 High Risk (Score >/= 5) - Pharmacological DVT Prophylaxis Ordered: warfarin (Coumadin)  Sequential Compression Devices Ordered  Patient Centered Rounds: I performed bedside rounds with nursing staff today  Discussions with Specialists or Other Care Team Provider: nursing     Education and Discussions with Family / Patient: Patient declined call to   Time Spent for Care: 45 minutes  More than 50% of total time spent on counseling and coordination of care as described above  Current Length of Stay: 0 day(s)  Current Patient Status: Inpatient   Certification Statement: The patient, admitted on an observation basis, will now require > 2 midnight hospital stay due to need for medication change and adjustement observe overnight as well as chest pain   Discharge Plan: Anticipate discharge in 24-48 hrs to possible need for stress test as well as medications adjusment     Code Status: Level 1 - Full Code    Subjective:   Pt is reporting dizziness since change of diltiazem  He has difficulty describing but reports he has moments while driving that feel very strong and make him go "woah" he states it is not spinning  He  Does report he had been eating a hoagie hrs before he had some chest pain with some burping  He takes ppi for many years  Palpated pts chest and he denies any chest pain with palpation at bedside     Objective:     Vitals:   Temp (24hrs), Av °F (36 7 °C), Min:97 5 °F (36 4 °C), Max:98 5 °F (36 9 °C)    Temp:  [97 5 °F (36 4 °C)-98 5 °F (36 9 °C)] 98 5 °F (36 9 °C)  HR:  [50-59] 58  Resp:  [17-21] 20  BP: (155-181)/() 164/88  SpO2:  [94 %-98 %] 94 %  Body mass index is 36 8 kg/m²  Input and Output Summary (last 24 hours):      Intake/Output Summary (Last 24 hours) at 2022 1609  Last data filed at 2022 1348  Gross per 24 hour   Intake 480 ml   Output --   Net 480 ml       Physical Exam:   Physical Exam  Constitutional:       General: He is not in acute distress  Appearance: He is not ill-appearing, toxic-appearing or diaphoretic  HENT:      Head: Normocephalic and atraumatic  Mouth/Throat:      Pharynx: Oropharynx is clear  Eyes:      General:         Right eye: No discharge  Left eye: No discharge  Conjunctiva/sclera: Conjunctivae normal    Cardiovascular:      Rate and Rhythm: Normal rate  Heart sounds: No murmur heard  No friction rub  No gallop  Pulmonary:      Effort: No respiratory distress  Breath sounds: No stridor  No wheezing, rhonchi or rales  Chest:      Chest wall: No tenderness  Abdominal:      General: There is no distension  Palpations: There is no mass  Tenderness: There is no abdominal tenderness  There is no guarding or rebound  Hernia: No hernia is present  Musculoskeletal:         General: No swelling, tenderness, deformity or signs of injury  Right lower leg: No edema  Left lower leg: No edema  Skin:     Coloration: Skin is not jaundiced or pale  Findings: No bruising, erythema, lesion or rash  Neurological:      Mental Status: He is alert and oriented to person, place, and time     Psychiatric:         Behavior: Behavior normal           Additional Data:     Labs:  Results from last 7 days   Lab Units 07/30/22  0453 07/29/22  2203   WBC Thousand/uL 5 99 6 12   HEMOGLOBIN g/dL 14 8 14 4   HEMATOCRIT % 44 2 43 2   PLATELETS Thousands/uL 167 171   NEUTROS PCT %  --  69   LYMPHS PCT %  --  20   MONOS PCT %  --  9   EOS PCT %  --  2     Results from last 7 days   Lab Units 07/30/22  0453 07/29/22  2203   SODIUM mmol/L 142 140   POTASSIUM mmol/L 3 3* 3 0*   CHLORIDE mmol/L 109* 107   CO2 mmol/L 28 28   BUN mg/dL 7 10   CREATININE mg/dL 1 08 1 18   ANION GAP mmol/L 5 5   CALCIUM mg/dL 8 7 8 9   ALBUMIN g/dL  --  3 7   TOTAL BILIRUBIN mg/dL  --  1 89*   ALK PHOS U/L  --  135*   ALT U/L  --  30   AST U/L  --  21 GLUCOSE RANDOM mg/dL 110 101     Results from last 7 days   Lab Units 07/29/22  2306   INR  2 44*     Results from last 7 days   Lab Units 07/30/22  0553   POC GLUCOSE mg/dl 110               Lines/Drains:  Invasive Devices  Report    Peripheral Intravenous Line  Duration           Peripheral IV 07/29/22 Right Antecubital <1 day                  Telemetry:  Telemetry Orders (From admission, onward)             24 Hour Telemetry Monitoring  Continuous x 24 Hours (Telem)        References:    Telemetry Guidelines   Question:  Reason for 24 Hour Telemetry  Answer:  Metabolic/Electrolyte Disturbance with High Probability of Dysrhythmia (K level <3 or >6, or KCL infusion >=10mEq/hr)                 Telemetry Reviewed: Sinus Bradycardia  Indication for Continued Telemetry Use: Arrthymias requiring medical therapy             Imaging: Reviewed radiology reports from this admission including: chest xray    Recent Cultures (last 7 days):         Last 24 Hours Medication List:   Current Facility-Administered Medications   Medication Dose Route Frequency Provider Last Rate    aluminum-magnesium hydroxide-simethicone  30 mL Oral Q4H PRN Nighat Day DO      aspirin  81 mg Oral Daily Nighat Day DO      atorvastatin  40 mg Oral Daily With [employer] DO Barbara      [START ON 7/31/2022] diltiazem  120 mg Oral Daily Anne Veronica MD      famotidine  20 mg Oral Daily Nighat Day DO      furosemide  20 mg Oral Daily Nighat Day DO      [START ON 7/31/2022] metoprolol succinate  75 mg Oral Daily Anne Veronica MD      nitroglycerin  0 4 mg Sublingual Q5 Min PRN Nighat Day DO      ondansetron  4 mg Intravenous Q6H PRN Nighat Day DO      pantoprazole  40 mg Oral BID AC DONELL Billy      [START ON 8/1/2022] warfarin  5 mg Oral Once per day on Mon Tue Thu Fri Nighat Day DO      warfarin  6 mg Oral Once per day on Sun Wed Sat Nighat Day DO          Today, Patient Was Seen By: DONELL Carmona    **Please Note: This note may have been constructed using a voice recognition system  **

## 2022-07-30 NOTE — ASSESSMENT & PLAN NOTE
· Blood pressure elevated on presentation, slowly improving  · Resume home antihypertensives in a m , monitor blood pressure per protocol

## 2022-07-30 NOTE — ED ATTENDING ATTESTATION
7/29/2022  Garrett STONE See, DO, saw and evaluated the patient  I have discussed the patient with the resident/non-physician practitioner and agree with the resident's/non-physician practitioner's findings, Plan of Care, and MDM as documented in the resident's/non-physician practitioner's note, except where noted  All available labs and Radiology studies were reviewed  I was present for key portions of any procedure(s) performed by the resident/non-physician practitioner and I was immediately available to provide assistance  At this point I agree with the current assessment done in the Emergency Department  I have conducted an independent evaluation of this patient a history and physical is as follows:    51-year-old male presents for chest pain  Occurred a few hours ago  Substernal   Pressure like  Became diaphoretic as well had some shortness of breath felt lightheaded felt off balance  No headache no neck pain no jaw pain no back pain does not radiate no other modifying factors or associated symptoms  History of AFib on Coumadin  Other comorbidities as noted in the chart  Exam is unremarkable EKG read by me as normal sinus rhythm normal vital signs other than hypertension  Neurovascularly intact    Normal neurologic exam   Plan delta troponin may need admission given diaphoresis and substernal possibly exertional chest pain    ED Course         Critical Care Time  Procedures

## 2022-07-31 VITALS
WEIGHT: 242 LBS | HEART RATE: 57 BPM | TEMPERATURE: 98.1 F | DIASTOLIC BLOOD PRESSURE: 89 MMHG | RESPIRATION RATE: 18 BRPM | SYSTOLIC BLOOD PRESSURE: 164 MMHG | HEIGHT: 68 IN | OXYGEN SATURATION: 94 % | BODY MASS INDEX: 36.68 KG/M2

## 2022-07-31 PROBLEM — R07.9 CHEST PAIN: Status: RESOLVED | Noted: 2022-07-30 | Resolved: 2022-07-31

## 2022-07-31 PROBLEM — E87.6 HYPOKALEMIA: Status: RESOLVED | Noted: 2022-07-30 | Resolved: 2022-07-31

## 2022-07-31 LAB
ANION GAP SERPL CALCULATED.3IONS-SCNC: 4 MMOL/L (ref 4–13)
ATRIAL RATE: 53 BPM
ATRIAL RATE: 55 BPM
BUN SERPL-MCNC: 10 MG/DL (ref 5–25)
CALCIUM SERPL-MCNC: 9.1 MG/DL (ref 8.3–10.1)
CHLORIDE SERPL-SCNC: 106 MMOL/L (ref 96–108)
CO2 SERPL-SCNC: 30 MMOL/L (ref 21–32)
CREAT SERPL-MCNC: 1.2 MG/DL (ref 0.6–1.3)
GFR SERPL CREATININE-BSD FRML MDRD: 64 ML/MIN/1.73SQ M
GLUCOSE SERPL-MCNC: 113 MG/DL (ref 65–140)
INR PPP: 2.5 (ref 0.84–1.19)
P AXIS: 45 DEGREES
P AXIS: 56 DEGREES
POTASSIUM SERPL-SCNC: 3.4 MMOL/L (ref 3.5–5.3)
PR INTERVAL: 130 MS
PR INTERVAL: 148 MS
PROTHROMBIN TIME: 27.2 SECONDS (ref 11.6–14.5)
QRS AXIS: 23 DEGREES
QRS AXIS: 5 DEGREES
QRSD INTERVAL: 88 MS
QRSD INTERVAL: 90 MS
QT INTERVAL: 336 MS
QT INTERVAL: 444 MS
QTC INTERVAL: 321 MS
QTC INTERVAL: 416 MS
SODIUM SERPL-SCNC: 140 MMOL/L (ref 135–147)
T WAVE AXIS: 26 DEGREES
T WAVE AXIS: 5 DEGREES
VENTRICULAR RATE: 53 BPM
VENTRICULAR RATE: 55 BPM

## 2022-07-31 PROCEDURE — 93010 ELECTROCARDIOGRAM REPORT: CPT | Performed by: INTERNAL MEDICINE

## 2022-07-31 PROCEDURE — 85610 PROTHROMBIN TIME: CPT | Performed by: NURSE PRACTITIONER

## 2022-07-31 PROCEDURE — 99239 HOSP IP/OBS DSCHRG MGMT >30: CPT | Performed by: NURSE PRACTITIONER

## 2022-07-31 PROCEDURE — 80048 BASIC METABOLIC PNL TOTAL CA: CPT | Performed by: NURSE PRACTITIONER

## 2022-07-31 RX ORDER — WARFARIN SODIUM 3 MG/1
TABLET ORAL
Refills: 0
Start: 2022-07-31 | End: 2022-10-26

## 2022-07-31 RX ORDER — WARFARIN SODIUM 5 MG/1
TABLET ORAL
Refills: 0
Start: 2022-08-01 | End: 2022-08-02 | Stop reason: SDUPTHER

## 2022-07-31 RX ORDER — CARVEDILOL 12.5 MG/1
12.5 TABLET ORAL 2 TIMES DAILY WITH MEALS
Status: DISCONTINUED | OUTPATIENT
Start: 2022-07-31 | End: 2022-07-31 | Stop reason: HOSPADM

## 2022-07-31 RX ORDER — CARVEDILOL 12.5 MG/1
12.5 TABLET ORAL 2 TIMES DAILY WITH MEALS
Qty: 60 TABLET | Refills: 0 | Status: SHIPPED | OUTPATIENT
Start: 2022-07-31 | End: 2022-08-16 | Stop reason: SDUPTHER

## 2022-07-31 RX ORDER — DILTIAZEM HYDROCHLORIDE 120 MG/1
120 CAPSULE, COATED, EXTENDED RELEASE ORAL DAILY
Qty: 30 CAPSULE | Refills: 0 | Status: SHIPPED | OUTPATIENT
Start: 2022-08-01 | End: 2022-08-16 | Stop reason: ALTCHOICE

## 2022-07-31 RX ADMIN — FAMOTIDINE 20 MG: 20 TABLET, FILM COATED ORAL at 10:00

## 2022-07-31 RX ADMIN — PANTOPRAZOLE SODIUM 40 MG: 40 TABLET, DELAYED RELEASE ORAL at 06:01

## 2022-07-31 RX ADMIN — CARVEDILOL 12.5 MG: 12.5 TABLET, FILM COATED ORAL at 10:15

## 2022-07-31 RX ADMIN — FUROSEMIDE 20 MG: 20 TABLET ORAL at 10:00

## 2022-07-31 RX ADMIN — DILTIAZEM HYDROCHLORIDE 120 MG: 120 CAPSULE, COATED, EXTENDED RELEASE ORAL at 10:00

## 2022-07-31 NOTE — ASSESSMENT & PLAN NOTE
Pt reports "dizziness" since change of medications  · States he was transitioned from norvasc to diltiazem, when symptoms seem to have started   · High dose metoprolol   · Discussed with cardiology  Medications adjusted as noted   · Monitor symptoms   · Orthostatics negative

## 2022-07-31 NOTE — ASSESSMENT & PLAN NOTE
· Pt with symptoms of dizziness, bradycardia and hypertension on   · Metoprolol and cardizem, Doses lowered   · However, bradycardia improved sbp still elevated     · Transitioned to coreg 2 5mg bid and lower dose cardizem 120mg per cardiology and now ok for dc   · Allergy to lisinopril and side affects to norvasc (le edema)   · Anticoagulated on Coumadin and therapeutic

## 2022-07-31 NOTE — DISCHARGE SUMMARY
[address] [facility]  Discharge- Marlyn Adams 1960, 58 y o  male MRN: [id number]  Unit/Bed#: Matteo Johnson 106-39 Encounter: [id number]  Primary Care Provider: Ballard Bence, MD   Date and time admitted to hospital: 7/29/2022  9:35 PM    * Atypical chest pain-resolved as of 7/31/2022  Assessment & Plan  · MIGUELITO 2, atypical pain , MI type 2 ruled out   · Presenting with ongoing substernal chest pain not palpable  · Possible GI in nature versus secondary to elevated blood pressure verses anxiety  · Pt takes PPi will increase to bid for now   · Follow-up troponin are negative ,   · monitor on telemetry  · Reassess pain - discussed with cardiology will monitor over night as pt with symptoms of "dizziness"     Dizziness  Assessment & Plan  Pt reports "dizziness" since change of medications  · States he was transitioned from norvasc to diltiazem, when symptoms seem to have started   · High dose metoprolol   · Discussed with cardiology  Medications adjusted as noted   · Monitor symptoms   · Orthostatics negative      Hypokalemia  Assessment & Plan  · Hypokalemic on admission  · Replete potassium   · Repeat BMP in a m  History of stroke  Assessment & Plan  · History of CVA, without current focal deficits  · Continue Coumadin, statin  · Pt is therepeutic     Bright red blood per rectum  Assessment & Plan  · Ongoing for several days per patient, was seen in this hospital's ED 07/28/2022 for this  · Hemoglobin stable, has outpatient follow-up with GI 08/10/2022  · If worsens while inpatient or patient expresses concerns can discuss with GI    Atrial fibrillation ([facility] )  Assessment & Plan  · Pt with symptoms of dizziness, bradycardia and hypertension on   · Metoprolol and cardizem, Doses lowered   · However, bradycardia improved sbp still elevated     · Transitioned to coreg 2 5mg bid and lower dose cardizem 120mg per cardiology and now ok for dc   · Allergy to lisinopril and side affects to norvasc (le edema)   · Anticoagulated on Coumadin and therapeutic    GERD (gastroesophageal reflux disease)  Assessment & Plan  · Continue PPI increase to bid     Hypertension  Assessment & Plan  · Blood pressure elevated on presentation, slowly improving  · Resume home antihypertensives in a m , monitor blood pressure per protocol      Medical Problems             Resolved Problems  Date Reviewed: 8/8/2022          Resolved    * (Principal) Atypical chest pain 7/31/2022     Resolved by  Chris Yo              Discharging Physician / Practitioner: DONELL Yo  PCP: Dina Tripathi MD  Admission Date:   Admission Orders (From admission, onward)     Ordered        07/30/22 1545  Inpatient Admission  Once            07/30/22 0114  Place in Observation  Once                      Discharge Date: 08/15/22    Consultations During Hospital Stay:  · Dr Darren Grayson - cardiology     Procedures Performed:   · Potassium 3 4 on day of discharge repleted with 40 meq prior to dc  · troponins stable   · INR 2 40 at discharge 7/31  · Covid /flu a and flu B/ rsv negative   · CT abdomen and pelvis w iv contrast 7/28:   No evidence of bowel obstruction   Diverticulosis without evidence of diverticulitis or colitis   No findings to suggest proctitis  Significant Findings / Test Results:   · See above     Incidental Findings:   · See above     Test Results Pending at Discharge (will require follow up):   · none     Outpatient Tests Requested:  · Follow up pcp in one week   · Call to schedule follow up with cardiology at soonest available appointment     Complications:  none    Reason for Admission:     Hospital Course:   Gage Cervantes is a 58 y o  male patient who originally presented to the hospital on 7/29/2022 due to chest pain   Pt reports chest pain developing afternoon of arriving in the ed pt has past medical hx of antiphospholipid syndrome with history of prior cva x 2 , afib, on coumadin, htn, hld, gerd and anxiety  Pt reported he was just in the hospital ed on 7/20/22 with brbr and harper revealing stable HH and ct abd and pelvis with diverticulosis but not noted bleeding  It appeared that pt was offered to be admitted but her wanted to fu with outpt GI  He was doing well however , day of this admission pt reports he was having chest pain described as dull, substernal, and radiating to the back associated with some dizziness , diaphoresis and nausea but no vomiting  He denied any pain with exertion or sob  He had not taken anything for pain at home and pain continued so he came to the hospital    Troponins on arrival were wnl, ekg without acute ischemic change  On further evaluation during admission pt had more concern for his episodes of dizziness and feeling as if he had a tight cap on his head  Pt was also noted to be bradycardic on his high dose metoprolol and cardizem  These were adjusted and lowered  Pt had slight improvement in HR but sbp still elevated  Per cardiology they discontinued his metoprolol and started him on coreg 12 5mg BID  With reduced cardizem 120 mg    Pt per cardiology ok to dc pt home and fu with his cardiologist and pcp  Please see above list of diagnoses and related plan for additional information  Condition at Discharge: fair    Discharge Day Visit / Exam:   Subjective:  Pt still states he feels a little dizzy and understands plan of care plan was to administer medication this am and monitor until feeling a little better  Once feeling well he would be able to go home   Vitals: Blood Pressure: 164/89 (07/31/22 1144)  Pulse: 57 (07/31/22 1144)  Temperature: 98 1 °F (36 7 °C) (07/31/22 0823)  Temp Source: Oral (07/31/22 0823)  Respirations: 18 (07/31/22 0823)  Height: 5' 8" (172 7 cm) (07/30/22 0358)  Weight - Scale: 110 kg (242 lb) (07/30/22 0358)  SpO2: 94 % (07/31/22 1144)  Exam:   Physical Exam  Constitutional:       General: He is not in acute distress       Appearance: He is obese  He is not ill-appearing, toxic-appearing or diaphoretic  HENT:      Head: Normocephalic and atraumatic  Nose: No congestion  Mouth/Throat:      Pharynx: No oropharyngeal exudate  Eyes:      General:         Right eye: No discharge  Left eye: No discharge  Conjunctiva/sclera: Conjunctivae normal    Cardiovascular:      Rate and Rhythm: Normal rate  Heart sounds: No murmur heard  No friction rub  No gallop  Pulmonary:      Effort: No respiratory distress  Breath sounds: No stridor  No wheezing, rhonchi or rales  Chest:      Chest wall: No tenderness  Abdominal:      General: There is no distension  Palpations: There is no mass  Tenderness: There is no abdominal tenderness  There is no guarding or rebound  Hernia: No hernia is present  Musculoskeletal:         General: No swelling, tenderness, deformity or signs of injury  Right lower leg: No edema  Left lower leg: No edema  Skin:     Coloration: Skin is not jaundiced or pale  Findings: No bruising, erythema, lesion or rash  Neurological:      Mental Status: He is alert and oriented to person, place, and time  Psychiatric:         Behavior: Behavior normal           Discussion with Family: Patient declined call to   Discharge instructions/Information to patient and family:   See after visit summary for information provided to patient and family  Provisions for Follow-Up Care:  See after visit summary for information related to follow-up care and any pertinent home health orders  Disposition:   Home    Planned Readmission: no plan for readmission      Discharge Statement:  I spent 50 minutes discharging the patient  This time was spent on the day of discharge  I had direct contact with the patient on the day of discharge   Greater than 50% of the total time was spent examining patient, answering all patient questions, arranging and discussing plan of care with patient as well as directly providing post-discharge instructions  Additional time then spent on discharge activities  Discharge Medications:  See after visit summary for reconciled discharge medications provided to patient and/or family        **Please Note: This note may have been constructed using a voice recognition system**

## 2022-08-01 DIAGNOSIS — I73.9 PVD (PERIPHERAL VASCULAR DISEASE) (HCC): ICD-10-CM

## 2022-08-01 DIAGNOSIS — L23.7 ALLERGIC CONTACT DERMATITIS DUE TO PLANTS, EXCEPT FOOD: ICD-10-CM

## 2022-08-02 ENCOUNTER — TRANSITIONAL CARE MANAGEMENT (OUTPATIENT)
Dept: FAMILY MEDICINE CLINIC | Facility: CLINIC | Age: 62
End: 2022-08-02

## 2022-08-02 DIAGNOSIS — I10 PRIMARY HYPERTENSION: ICD-10-CM

## 2022-08-02 DIAGNOSIS — R07.9 CHEST PAIN: ICD-10-CM

## 2022-08-02 DIAGNOSIS — R42 DIZZINESS: ICD-10-CM

## 2022-08-02 RX ORDER — MOMETASONE FUROATE 1 MG/G
CREAM TOPICAL DAILY
Qty: 30 G | Refills: 0 | Status: SHIPPED | OUTPATIENT
Start: 2022-08-02 | End: 2022-08-08 | Stop reason: ALTCHOICE

## 2022-08-02 RX ORDER — FUROSEMIDE 20 MG/1
TABLET ORAL
Qty: 30 TABLET | Refills: 2 | Status: SHIPPED | OUTPATIENT
Start: 2022-08-02 | End: 2022-10-26

## 2022-08-02 RX ORDER — WARFARIN SODIUM 5 MG/1
TABLET ORAL
Qty: 90 TABLET | Refills: 1 | Status: SHIPPED | OUTPATIENT
Start: 2022-08-02 | End: 2022-08-02

## 2022-08-02 RX ORDER — WARFARIN SODIUM 5 MG/1
TABLET ORAL
Qty: 90 TABLET | Refills: 1 | Status: SHIPPED | OUTPATIENT
Start: 2022-08-02 | End: 2022-10-26

## 2022-08-02 NOTE — UTILIZATION REVIEW
Notification of Discharge   This is a Notification of Discharge from our facility [address]  Please be advised that this patient has been discharge from our facility  Below you will find the admission and discharge date and time including the patients disposition  UTILIZATION REVIEW CONTACT:  Omega Zuleta  Utilization   Network Utilization Review Department  Phone: [phone] x carefully listen to the prompts  All voicemails are confidential   Email: [email or web address]     PHYSICIAN ADVISORY SERVICES:  FOR TLEC-AO-VDIG REVIEW - MEDICAL NECESSITY DENIAL  Phone: [phone]  Fax: [phone]  Email: [email or web address]     PRESENTATION DATE: 7/29/2022  9:35 PM  OBERVATION ADMISSION DATE:   INPATIENT ADMISSION DATE: 7/30/22  3:45 PM   DISCHARGE DATE: 7/31/2022 12:25 PM  DISPOSITION: Home/Self Care Home/Self Care      IMPORTANT INFORMATION:  Send all requests for admission clinical reviews, approved or denied determinations and any other requests to dedicated fax number below belonging to the campus where the patient is receiving treatment   List of dedicated fax numbers:  [address] DENIALS (Administrative/Medical Necessity) [phone]   [address] (Maternity/NICU/Pediatrics) [phone]   Reggie Min [phone]   [address] [phone]   [address] [place] [address] [phone]   [address] [phone]   [facility]  [phone]   [address] [place] [address], [facility]  [address] [facility] 1000 [facility] [phone]

## 2022-08-02 NOTE — TELEPHONE ENCOUNTER
Requested Prescriptions     Pending Prescriptions Disp Refills    warfarin (COUMADIN) 5 mg tablet [Pharmacy Med Name: WARFARIN SODIUM 5 MG TABLET] 90 tablet 1     Sig: CONTINUE TO TAKE AS PRIOR DO NOT FILL     LOV 7/28/22, F/U 8/8/22 with TS, labs completed

## 2022-08-08 ENCOUNTER — TELEPHONE (OUTPATIENT)
Dept: GASTROENTEROLOGY | Facility: CLINIC | Age: 62
End: 2022-08-08

## 2022-08-08 ENCOUNTER — ANTICOAG VISIT (OUTPATIENT)
Dept: FAMILY MEDICINE CLINIC | Facility: CLINIC | Age: 62
End: 2022-08-08

## 2022-08-08 ENCOUNTER — OFFICE VISIT (OUTPATIENT)
Dept: FAMILY MEDICINE CLINIC | Facility: CLINIC | Age: 62
End: 2022-08-08
Payer: COMMERCIAL

## 2022-08-08 ENCOUNTER — HOSPITAL ENCOUNTER (OUTPATIENT)
Dept: CT IMAGING | Facility: HOSPITAL | Age: 62
Discharge: HOME | End: 2022-08-08
Payer: COMMERCIAL

## 2022-08-08 ENCOUNTER — APPOINTMENT (OUTPATIENT)
Dept: LAB | Facility: HOSPITAL | Age: 62
End: 2022-08-08
Payer: COMMERCIAL

## 2022-08-08 VITALS
HEIGHT: 68 IN | WEIGHT: 231 LBS | HEART RATE: 70 BPM | BODY MASS INDEX: 35.01 KG/M2 | DIASTOLIC BLOOD PRESSURE: 90 MMHG | SYSTOLIC BLOOD PRESSURE: 150 MMHG | TEMPERATURE: 97 F

## 2022-08-08 DIAGNOSIS — I10 PRIMARY HYPERTENSION: ICD-10-CM

## 2022-08-08 DIAGNOSIS — R00.1 BRADYCARDIA: ICD-10-CM

## 2022-08-08 DIAGNOSIS — Z79.01 ANTICOAGULATED ON COUMADIN: ICD-10-CM

## 2022-08-08 DIAGNOSIS — R29.898 WEAKNESS OF BOTH LOWER EXTREMITIES: ICD-10-CM

## 2022-08-08 DIAGNOSIS — D68.61 ANTIPHOSPHOLIPID ANTIBODY WITH HYPERCOAGULABLE STATE (HCC): ICD-10-CM

## 2022-08-08 DIAGNOSIS — H53.9 VISUAL DISTURBANCE: ICD-10-CM

## 2022-08-08 DIAGNOSIS — R27.0 ATAXIA: ICD-10-CM

## 2022-08-08 DIAGNOSIS — R07.9 CHEST PAIN, UNSPECIFIED TYPE: Primary | ICD-10-CM

## 2022-08-08 DIAGNOSIS — R25.1 TREMOR: ICD-10-CM

## 2022-08-08 DIAGNOSIS — I63.9 CEREBROVASCULAR ACCIDENT (CVA), UNSPECIFIED MECHANISM (HCC): ICD-10-CM

## 2022-08-08 DIAGNOSIS — K62.5 BRIGHT RED BLOOD PER RECTUM: ICD-10-CM

## 2022-08-08 DIAGNOSIS — R07.9 CHEST PAIN, UNSPECIFIED TYPE: ICD-10-CM

## 2022-08-08 LAB — INR PPP: 3.01 (ref 0.84–1.19)

## 2022-08-08 PROCEDURE — G1004 CDSM NDSC: HCPCS

## 2022-08-08 PROCEDURE — 99495 TRANSJ CARE MGMT MOD F2F 14D: CPT | Performed by: FAMILY MEDICINE

## 2022-08-08 PROCEDURE — 71275 CT ANGIOGRAPHY CHEST: CPT

## 2022-08-08 PROCEDURE — 1111F DSCHRG MED/CURRENT MED MERGE: CPT | Performed by: FAMILY MEDICINE

## 2022-08-08 PROCEDURE — 93000 ELECTROCARDIOGRAM COMPLETE: CPT | Performed by: FAMILY MEDICINE

## 2022-08-08 RX ORDER — AMLODIPINE BESYLATE 2.5 MG/1
2.5 TABLET ORAL DAILY
Qty: 30 TABLET | Refills: 5 | Status: SHIPPED | OUTPATIENT
Start: 2022-08-08 | End: 2022-08-30

## 2022-08-08 RX ADMIN — IOHEXOL 65 ML: 350 INJECTION, SOLUTION INTRAVENOUS at 17:00

## 2022-08-08 NOTE — ASSESSMENT & PLAN NOTE
Lead low-dose of amlodipine as patient's pulse will not tolerate any increasing in diltiazem or carvedilol

## 2022-08-08 NOTE — PROGRESS NOTES
Assessment/Plan:     1  Chest pain, status post hospitalization July 20 9-31st   EKG in office today shows no acute change no change from previous  Will check CTA rule out pulmonary embolism  2  Bright red blood per rectum, patient follow-up Gastroenterology  3  CVA/lower extremity weakness patient does see Neurology   4  Ataxic gait/visual disturbance  Will check MRI of brain  5  Antiphospholipid antibody with hypercoagulable state, patient is on Coumadin anticoagulation  6  Coumadin anticoagulation  7  Tremor, follows with Neurology   8  Mild hypokalemia blood work 07/31/2022  9  Will check BMP and CBC tomorrow  10  Hypertension not well controlled will add low-dose amlodipine 2 5 mg daily as with patient having bradycardia I do not believe he will tolerate any increase of his carvedilol all diltiazem  11 Will have patient recheck breast usual PCP in 2 weeks, Dr Parisa Logan  12  If symptomatology worsens patient is history [facility] to [facility] for further evaluation      Bright red blood per rectum  Patient is to follow-up gastroenterologist    Atrial fibrillation [facility])  Follow-up with cardiology    CVA (cerebral vascular accident) [facility])  Does see neurologist with patient's blurry vision and ataxia, MRI brain is ordered    Weakness of both lower extremities  Follows with Neurology check MRI    Antiphospholipid antibody with hypercoagulable state ([address] )  Is on Coumadin    Anticoagulated on Coumadin  On Coumadin anticoagulation    Tremor  Chronic, follows with Neurology    Hypertension  Lead low-dose of amlodipine as patient's pulse will not tolerate any increasing in diltiazem or carvedilol       Diagnoses and all orders for this visit:    Chest pain, unspecified type  -     POCT ECG  -     CTA chest pe study; Future  -     CBC; Future  -     Basic metabolic panel; Future    Bright red blood per rectum  -     CBC; Future  -     Basic metabolic panel;  Future    Cerebrovascular accident (CVA), unspecified mechanism ([place] [address] )  -     MRI brain w wo contrast; Future  -     CBC; Future  -     Basic metabolic panel; Future    Primary hypertension  -     CBC; Future  -     Basic metabolic panel; Future  -     amLODIPine (NORVASC) 2 5 mg tablet; Take 1 tablet (2 5 mg total) by mouth daily    Weakness of both lower extremities  -     MRI brain w wo contrast; Future  -     CBC; Future  -     Basic metabolic panel; Future    Antiphospholipid antibody with hypercoagulable state ([place] [address] )  -     MRI brain w wo contrast; Future  -     CBC; Future  -     Basic metabolic panel; Future    Anticoagulated on Coumadin  -     CBC; Future  -     Basic metabolic panel; Future    Tremor  -     CBC; Future  -     Basic metabolic panel; Future    Ataxia  -     MRI brain w wo contrast; Future  -     CBC; Future  -     Basic metabolic panel; Future    Visual disturbance  -     MRI brain w wo contrast; Future  -     CBC; Future  -     Basic metabolic panel; Future    Bradycardia  -     CBC; Future  -     Basic metabolic panel; Future       Subjective:     Patient ID: Shoshana Silverio is a 58 y o  male  Patient was in [facility] from the 29th the 31st of July  For chest pain and dizziness  Patient still feels chest pain and dizziness he feels no better now has mild headache dizziness and some visual changes  Patient does have a history of CVA  Patient has appointment upcoming with his cardiologist but not till the 18th  Patient is to see Gastroenterology for rectal bleeding  Patient states he has no rectal bleeding at this time but did have some dark stools  Review of Systems   Constitutional: Negative  HENT: Negative  Eyes:        HPI   Respiratory: Negative  Cardiovascular:        HPI   Gastrointestinal:        HPI   Endocrine: Negative  Genitourinary: Negative  Musculoskeletal: Negative  Skin: Negative  Allergic/Immunologic: Negative  Neurological:        HPI   Hematological: Negative  Psychiatric/Behavioral: Negative  Objective:     Physical Exam  Vitals and nursing note reviewed  Constitutional:       Appearance: Normal appearance  HENT:      Head: Normocephalic and atraumatic  Eyes:      General: No scleral icterus  Extraocular Movements: Extraocular movements intact  Conjunctiva/sclera: Conjunctivae normal       Pupils: Pupils are equal, round, and reactive to light  Neck:      Vascular: No carotid bruit  Cardiovascular:      Rate and Rhythm: Normal rate and regular rhythm  Heart sounds: Normal heart sounds  Pulmonary:      Effort: Pulmonary effort is normal       Breath sounds: Normal breath sounds  Abdominal:      General: Bowel sounds are normal       Palpations: Abdomen is soft  Tenderness: There is no abdominal tenderness  Musculoskeletal:      Cervical back: Neck supple  Right lower leg: No edema  Left lower leg: No edema  Skin:     General: Skin is warm and dry  Neurological:      General: No focal deficit present  Mental Status: He is alert  Cranial Nerves: No cranial nerve deficit  Comments: Mild resting tremor left upper extremity worse than right   Psychiatric:         Mood and Affect: Mood normal            Vitals:    08/08/22 1520   BP: 150/90   BP Location: Right arm   Patient Position: Sitting   Cuff Size: Adult   Pulse: 70   Temp: (!) 97 °F (36 1 °C)   TempSrc: Temporal   Weight: 105 kg (231 lb)   Height: 5' 8" (1 727 m)       Transitional Care Management Review:  Catalina Dumont is a 58 y o  male here for TCM follow up       During the TCM phone call patient stated:    TCM Call     Date and time call was made  8/2/2022 11:48 AM    Hospital care reviewed  Records reviewed    Patient was hospitialized at  [facility]    Date of Admission  07/29/22    Date of discharge  07/31/22    Diagnosis  Chest Pain    Disposition  Home    Were the patients medications reviewed and updated  No    Current Symptoms  None      TCM Call     Post hospital issues  None    Should patient be enrolled in anticoag monitoring? No    Scheduled for follow up?   Yes    Did you obtain your prescribed medications  Yes    Do you need help managing your prescriptions or medications  No    Is transportation to your appointment needed  No    I have advised the patient to call PCP with any new or worsening symptoms  Hellen Camacho, Practice Administrator    Are you recieving home care services  Yes    Types of home care services  Home PT          Modesto Weston, DO

## 2022-08-08 NOTE — PATIENT INSTRUCTIONS
Complete MRI brain  Complete CTA of chest rule out pulmonary embolism tonight  Complete blood work tomorrow   Start amlodipine 2 5 mg daily for blood pressure  Follow all specialist   Recheck with Dr Alexei Andujar in 2-3 weeks  If symptomatology worsens report to [address] [place] [address] Emergency Department

## 2022-08-08 NOTE — TELEPHONE ENCOUNTER
Called pt and left message for them to call back and reschedule appt with Dr Mata  I explained that Dr Mata has become unavailable and will not be seeing patients tomorrow

## 2022-08-09 ENCOUNTER — APPOINTMENT (OUTPATIENT)
Dept: LAB | Facility: HOSPITAL | Age: 62
End: 2022-08-09
Payer: COMMERCIAL

## 2022-08-09 ENCOUNTER — TELEPHONE (OUTPATIENT)
Dept: FAMILY MEDICINE CLINIC | Facility: CLINIC | Age: 62
End: 2022-08-09

## 2022-08-09 DIAGNOSIS — I63.9 CEREBROVASCULAR ACCIDENT (CVA), UNSPECIFIED MECHANISM (HCC): ICD-10-CM

## 2022-08-09 DIAGNOSIS — R07.9 CHEST PAIN, UNSPECIFIED TYPE: ICD-10-CM

## 2022-08-09 DIAGNOSIS — R25.1 TREMOR: ICD-10-CM

## 2022-08-09 DIAGNOSIS — R29.898 WEAKNESS OF BOTH LOWER EXTREMITIES: ICD-10-CM

## 2022-08-09 DIAGNOSIS — H53.9 VISUAL DISTURBANCE: ICD-10-CM

## 2022-08-09 DIAGNOSIS — K62.5 BRIGHT RED BLOOD PER RECTUM: ICD-10-CM

## 2022-08-09 DIAGNOSIS — R00.1 BRADYCARDIA: ICD-10-CM

## 2022-08-09 DIAGNOSIS — I10 PRIMARY HYPERTENSION: ICD-10-CM

## 2022-08-09 DIAGNOSIS — Z79.01 ANTICOAGULATED ON COUMADIN: ICD-10-CM

## 2022-08-09 DIAGNOSIS — R27.0 ATAXIA: ICD-10-CM

## 2022-08-09 DIAGNOSIS — D68.61 ANTIPHOSPHOLIPID ANTIBODY WITH HYPERCOAGULABLE STATE (HCC): ICD-10-CM

## 2022-08-09 LAB
ANION GAP SERPL CALCULATED.3IONS-SCNC: 11 MMOL/L (ref 4–13)
BUN SERPL-MCNC: 8 MG/DL (ref 5–25)
CALCIUM SERPL-MCNC: 9 MG/DL (ref 8.3–10.1)
CHLORIDE SERPL-SCNC: 105 MMOL/L (ref 96–108)
CO2 SERPL-SCNC: 27 MMOL/L (ref 21–32)
CREAT SERPL-MCNC: 1.15 MG/DL (ref 0.6–1.3)
ERYTHROCYTE [DISTWIDTH] IN BLOOD BY AUTOMATED COUNT: 13.6 % (ref 11.6–15.1)
GFR SERPL CREATININE-BSD FRML MDRD: 67 ML/MIN/1.73SQ M
GLUCOSE P FAST SERPL-MCNC: 123 MG/DL (ref 65–99)
HCT VFR BLD AUTO: 44.4 % (ref 36.5–49.3)
HGB BLD-MCNC: 15 G/DL (ref 12–17)
MCH RBC QN AUTO: 29.4 PG (ref 26.8–34.3)
MCHC RBC AUTO-ENTMCNC: 33.8 G/DL (ref 31.4–37.4)
MCV RBC AUTO: 87 FL (ref 82–98)
PLATELET # BLD AUTO: 192 THOUSANDS/UL (ref 149–390)
PMV BLD AUTO: 10.9 FL (ref 8.9–12.7)
POTASSIUM SERPL-SCNC: 3.4 MMOL/L (ref 3.5–5.3)
RBC # BLD AUTO: 5.1 MILLION/UL (ref 3.88–5.62)
SODIUM SERPL-SCNC: 143 MMOL/L (ref 135–147)
WBC # BLD AUTO: 8.19 THOUSAND/UL (ref 4.31–10.16)

## 2022-08-09 PROCEDURE — 36415 COLL VENOUS BLD VENIPUNCTURE: CPT

## 2022-08-09 PROCEDURE — 80048 BASIC METABOLIC PNL TOTAL CA: CPT

## 2022-08-09 PROCEDURE — 85027 COMPLETE CBC AUTOMATED: CPT

## 2022-08-09 NOTE — TELEPHONE ENCOUNTER
Spoke to pt he advises he was unsure as to what time he should take his amlodipine and diltiazem  He states he takes his diltiazem in the morning therefore, he was advised to take his amlodipine in the afternoon or bedtime

## 2022-08-10 ENCOUNTER — CONSULT (OUTPATIENT)
Dept: GASTROENTEROLOGY | Facility: CLINIC | Age: 62
End: 2022-08-10
Payer: COMMERCIAL

## 2022-08-10 VITALS
TEMPERATURE: 97.2 F | WEIGHT: 234 LBS | HEART RATE: 65 BPM | DIASTOLIC BLOOD PRESSURE: 88 MMHG | BODY MASS INDEX: 35.46 KG/M2 | RESPIRATION RATE: 18 BRPM | OXYGEN SATURATION: 98 % | HEIGHT: 68 IN | SYSTOLIC BLOOD PRESSURE: 140 MMHG

## 2022-08-10 DIAGNOSIS — K62.5 BRIGHT RED BLOOD PER RECTUM: ICD-10-CM

## 2022-08-10 DIAGNOSIS — K31.819 GASTRIC AVM: ICD-10-CM

## 2022-08-10 DIAGNOSIS — K21.9 GASTROESOPHAGEAL REFLUX DISEASE, UNSPECIFIED WHETHER ESOPHAGITIS PRESENT: Primary | ICD-10-CM

## 2022-08-10 PROCEDURE — 99214 OFFICE O/P EST MOD 30 MIN: CPT | Performed by: PHYSICIAN ASSISTANT

## 2022-08-10 NOTE — PROGRESS NOTES
[facility] - Outpatient Follow-up Note  Gayle Roman 58 y o  male MRN: [id number]  Encounter: [id number]      Assessment and Plan    1  Rectal bleeding  The patient states that he had a few days of bright red rectal bleeding with wiping  Denies blood in the toilet bowl or stool  Was seen in the ER with a normal HGB and rectal exam but guaiac + stool test  Last colonoscopy 2020 with one small tubular adenoma removed and repeat recommended in 2025   - likely hemorrhoidal however offered colonoscopy which the patient is not interested in  - monitor and call with return of bleeding   - repeat colonoscopy 2025 or sooner if clinically indicated    2  Gastroesophageal reflux disease without esophagitis  Recent EGD in 2020 showed no esophagitis or Orta's esophagus  Well controlled on protonix 40mg once daily  - continue Protonix 40 mg daily  - continue dietary and lifestyle precautions     3  History of gastric AVM  EGD in 2021 revealed a single small gastric AVM status post ablation  The patient did have some recent dark stool but states this was after taking pepto  - no evidence of GI bleeding, will monitor for melena     Patient wishes to follow up as needed    ______________________________________________________________________    History of Present Illness  Gayle Roman is a 58 y o  male with aortic aneurysm repair, aortic stenosis, antiphospholipid syndrome, atrial fibrillation on Coumadin, and multiple CVAs here for follow up evaluation of rectal bleeding  The patient states that he does also admit to some dark stools but he states that he has been taking as needed Pepcid 0 for acid reflux  Otherwise this is relatively well controlled on Protonix 40 mg once daily  His last colonoscopy was in 2020 with 1 small tubular adenoma removed  Repeat was recommended 5 years later        Review of Systems   Constitutional: Negative for activity change, appetite change, chills, fatigue, fever and unexpected weight change  Gastrointestinal: Negative for constipation and diarrhea  Past Medical History  Past Medical History:   Diagnosis Date    Aneurysm of thoracic aorta ([facility] [address] )     last assessed 11/20/17    Anxiety     currently on no meds    Arthritis     Bilateral inguinal hernia     Cardiac disease     Depression     GERD (gastroesophageal reflux disease)     Hearing loss of aging     Hyperlipidemia     Hypertension     Irritable bowel syndrome     Kidney stone     Obesity     Occasional tremors     left arm since stroke    Palpitations     PONV (postoperative nausea and vomiting)     and headache x 3 days    Sciatica     right and left  side    Shortness of breath     on exertion    Sleep apnea     is not using a CPAP    Spitting up blood 05/21/2021    Resolved    Status post placement of implantable loop recorder     Stroke ([facility] [address] ) 11/2014    Stroke ([facility] [address] ) 03/2021    TIA (transient ischemic attack)        Past Social history  Past Surgical History:   Procedure Laterality Date    AORTA SURGERY      thoracic - aneurysmorrhaphy    APPENDECTOMY      ASCENDING AORTIC ANEURYSM REPAIR      resolved 8/19/15    CARDIAC LOOP RECORDER      CHOLECYSTECTOMY      laparoscopic    COLONOSCOPY      CYSTOSCOPY      with removal of object- stent removal    KNEE ARTHROSCOPY Right 1996    with medial meniscus repair    LITHOTRIPSY      renal    ORTHOPEDIC SURGERY      SC FRAGMENT KIDNEY STONE/ ESWL Left 5/17/2018    Procedure: LITHROTRIPSY EXTRACORPORAL SHOCKWAVE (ESWL);   Surgeon: Bakari Monroe MD;  [facility];  Service: Urology    SC Ameena Zackary [address] Bilateral 12/9/2021    Procedure: ROBOTIC REPAIR HERNIA INGUINAL;  Surgeon: Kamari Tan MD;  Location: [facility];  Service: General    THUMB ARTHROSCOPY Right 1976    ligament repair    UPPER GASTROINTESTINAL ENDOSCOPY       Social History     Socioeconomic History    Marital status:      Spouse name: Not on file    Number of children: Not on file    Years of education: Not on file    Highest education level: Not on file   Occupational History    Occupation: disabled     Tobacco Use    Smoking status: Never Smoker    Smokeless tobacco: Never Used    Tobacco comment: no second hand smoke exposure   Vaping Use    Vaping Use: Never used   Substance and Sexual Activity    Alcohol use: Not Currently    Drug use: No    Sexual activity: Not Currently   Other Topics Concern    Not on file   Social History Narrative    Caffeine use    Completed some college     as per [facility]     Social Determinants of Health     Financial Resource Strain: Not on file   Food Insecurity: Not on file   Transportation Needs: Not on file   Physical Activity: Not on file   Stress: Not on file   Social Connections: Not on file   Intimate Partner Violence: Not on file   Housing Stability: Not on file     Social History     Substance and Sexual Activity   Alcohol Use Not Currently     Social History     Substance and Sexual Activity   Drug Use No     Social History     Tobacco Use   Smoking Status Never Smoker   Smokeless Tobacco Never Used   Tobacco Comment    no second hand smoke exposure       Past Family History  Family History   Problem Relation Age of Onset    Diverticulitis Mother         of colon    Nephrolithiasis Mother     Emphysema Father     Nephrolithiasis Sister     Heart attack Maternal Grandfather 72    Breast cancer Paternal Grandmother     Lung cancer Paternal Grandfather     Cancer Family     Coronary artery disease Family     Diabetes Family         sibling    Hypertension Family         sibling    Hernia Family         sibling       Current Medications  Current Outpatient Medications   Medication Sig Dispense Refill    acetaminophen (TYLENOL) 500 mg tablet Take 500 mg by mouth every 6 (six) hours as needed for mild pain 2 tab      amLODIPine (NORVASC) 2 5 mg tablet Take 1 tablet (2 5 mg total) by mouth daily 30 tablet 5    atorvastatin (LIPITOR) 40 mg tablet Take 1 tablet (40 mg total) by mouth daily with dinner 90 tablet 1    carvedilol (COREG) 12 5 mg tablet Take 1 tablet (12 5 mg total) by mouth 2 (two) times a day with meals 60 tablet 0    diltiazem (CARDIZEM CD) 120 mg 24 hr capsule Take 1 capsule (120 mg total) by mouth daily 30 capsule 0    famotidine (PEPCID) 40 MG tablet TAKE 1 TABLET BY MOUTH EVERY DAY 90 tablet 1    furosemide (LASIX) 20 mg tablet TAKE 1 TABLET BY MOUTH EVERY DAY 30 tablet 2    pantoprazole (PROTONIX) 40 mg tablet TAKE 1 TABLET BY MOUTH EVERY DAY (Patient taking differently: 2 (two) times a day) 90 tablet 1    potassium chloride (Klor-Con) 10 mEq tablet Take 1 tablet daily for low potassium 30 tablet 5    warfarin (COUMADIN) 3 mg tablet Do not fill continue to take as prior plan  0    warfarin (COUMADIN) 5 mg tablet CONTINUE TO TAKE AS PRIOR DO NOT FILL 90 tablet 1    amoxicillin (AMOXIL) 500 mg capsule  (Patient not taking: No sig reported)       No current facility-administered medications for this visit  Allergies  Allergies   Allergen Reactions    Losartan Angioedema    Aspirin Fatigue     Due to blood thinners      Bactrim [Sulfamethoxazole-Trimethoprim]     Eliquis [Apixaban] Other (See Comments)     Failed  Had embolic CVA    Lisinopril      Felt bad, was OK with Zestril brand name      Tramadol          The following portions of the patient's history were reviewed and updated as appropriate: allergies, current medications, past medical history, past social history, past surgical history and problem list       Vitals  Vitals:    08/10/22 1041   BP: 140/88   BP Location: Right arm   Patient Position: Sitting   Cuff Size: Adult   Pulse: 65   Resp: 18   Temp: (!) 97 2 °F (36 2 °C)   TempSrc: Tympanic   SpO2: 98%   Weight: 106 kg (234 lb)   Height: 5' 8" (1 727 m)         Physical Exam  Constitutional   General appearance: Patient is seated and in no acute distress, well appearing and well nourished  Head and Face   Head and face: Normal     Eyes   Conjunctiva and lids: No erythema, swelling or discharge  Anicteric  Ears, Nose, Mouth, and Throat   Hearing: Normal     Neck: Supple, trachea midline  Pulmonary   Respiratory effort: No increased work of breathing or signs of respiratory distress  Cardiovascular   Examination of extremities for edema and/or varicosities: Normal      Musculoskeletal   Gait and station: Normal     Skin   Skin and subcutaneous tissue: Warm, dry, and intact  No visible jaundice, lesions or rashes  Psychiatric   Judgment and insight: Normal  Recent and remote memory:  Normal  Mood and affect: Normal      Results  No visits with results within 1 Day(s) from this visit  Latest known visit with results is:   Appointment on 08/09/2022   Component Date Value    WBC 08/09/2022 8 19     RBC 08/09/2022 5 10     Hemoglobin 08/09/2022 15 0     Hematocrit 08/09/2022 44 4     MCV 08/09/2022 87     MCH 08/09/2022 29 4     MCHC 08/09/2022 33 8     RDW 08/09/2022 13 6     Platelets 76/43/2828 192     MPV 08/09/2022 10 9     Sodium 08/09/2022 143     Potassium 08/09/2022 3 4 (A)    Chloride 08/09/2022 105     CO2 08/09/2022 27     ANION GAP 08/09/2022 11     BUN 08/09/2022 8     Creatinine 08/09/2022 1 15     Glucose, Fasting 08/09/2022 123 (A)    Calcium 08/09/2022 9 0     eGFR 08/09/2022 67        Radiology Results  XR chest 1 view portable    Result Date: 7/30/2022  Narrative: CHEST INDICATION:   Chest pain  COMPARISON:  Chest radiograph from 3/12/2020, chest CT from 5/21/2021  EXAM PERFORMED/VIEWS:  XR CHEST PORTABLE FINDINGS: Mild cardiomegaly  Median sternotomy  Loop recorder in the left chest wall  The lungs are clear  No pneumothorax or pleural effusion  Osseous structures appear within normal limits for patient age  Impression: No acute cardiopulmonary disease  Workstation performed: [id number]     VAS reflux lower limb venous duplex study with reflux assessment, complete bilateral    Result Date: 7/11/2022  Narrative:  [facility] REPORT CLINICAL: Indications: Patient presents for evaluation with veanous stasis  Patient states he has not been wearing compression stockings  Operative History: 2015-08-19 AAA repair Cardiac loop recorder thorasic aneurysmorrhaphy Risk Factors The patient has history of Obesity, HTN and Hyperlipidemia  FINDINGS:  Right      Valve   Reflux Time  Popliteal  Reflux         0 53     CONCLUSION: Impression: RIGHT LIMB: Deep venous incompetence is noted in the popliteal vein  The great saphenous vein is competent  The great saphenous vein exits the saphenous compartment in the mid thigh  The small saphenous vein is competent and does not communicate with the popliteal vein  There is no evidence of incompetent perforators in the thigh or calf  There is no evidence of deep vein thrombosis in the CFV, the proximal PFV, the femoral vein and the popliteal vein  LEFT LIMB: No evidence of deep venous incompetence  The great saphenous vein is competent  The great saphenous vein exits the saphenous compartment in the mid thigh  The small saphenous vein is competent and does not communicate with the popliteal vein  There is no evidence of incompetent perforators in the thigh or calf  There is no evidence of deep vein thrombosis in the CFV, the proximal PFV, the femoral vein and the popliteal vein  Study performed with patient standing and in steep Reverse Trendelenburg  SIGNATURE: Electronically Signed Bina Molina on 2022-07-11 04:28:31 PM    CTA chest pe study    Result Date: 8/8/2022  Narrative: CTA - CHEST WITH IV CONTRAST - PULMONARY ANGIOGRAM INDICATION:   R07 9: Chest pain, unspecified   COMPARISON: 5/21/2021 TECHNIQUE: CTA examination of the chest was performed using angiographic technique according to a protocol specifically tailored to evaluate for pulmonary embolism  Axial, sagittal, and coronal 2D reformatted images were created from the source data and  submitted for interpretation  In addition, coronal 3D MIP postprocessing was performed on the acquisition scanner  Radiation dose length product (DLP) for this visit:  395 mGy-cm   This examination, like all CT scans performed in the [facility], was performed utilizing techniques to minimize radiation dose exposure, including the use of iterative reconstruction and automated exposure control  IV Contrast:  65 mL of iohexol (OMNIPAQUE)  FINDINGS: PULMONARY ARTERIAL TREE:  No pulmonary embolus is seen  LUNGS:  Scattered mild subsegmental atelectasis versus scarring  There is no tracheal or endobronchial lesion  PLEURA:  Unremarkable  HEART/GREAT VESSELS:  Coronary artery calcifications no pericardial effusion  No thoracic aortic aneurysm  Stable postoperative changes of the ascending thoracic aorta  MEDIASTINUM AND ELLA:  Small hiatal hernia noted  No mediastinal or hilar lymphadenopathy  CHEST WALL AND LOWER NECK:   Loop recorder  Sternotomy wires  VISUALIZED STRUCTURES IN THE UPPER ABDOMEN:  Unremarkable  OSSEOUS STRUCTURES:  No acute fracture or destructive osseous lesion  Impression: No pulmonary embolus is seen  Workstation performed: [id number]     CT abdomen pelvis with contrast    Result Date: 7/28/2022  Narrative: CT ABDOMEN AND PELVIS WITH IV CONTRAST INDICATION:   Right lower quadrant pain and rectal bleeding  COMPARISON:  5/21/2021  TECHNIQUE:  CT examination of the abdomen and pelvis was performed  Axial, sagittal, and coronal 2D reformatted images were created from the source data and submitted for interpretation  Radiation dose length product (DLP) for this visit:  769 57 mGy-cm     This examination, like all CT scans performed in the [facility], was performed utilizing techniques to minimize radiation dose exposure, including the use of iterative  reconstruction and automated exposure control  IV Contrast:  66 mL of iohexol (OMNIPAQUE) Enteric Contrast:  Enteric contrast was not administered  FINDINGS: ABDOMEN LOWER CHEST:  Postsurgical change from median sternotomy  Mild cardiomegaly  Clear lung bases  LIVER/BILIARY TREE:  Unremarkable  GALLBLADDER:  Cholecystectomy  SPLEEN:  Unremarkable  PANCREAS:  Unremarkable  ADRENAL GLANDS:  Unremarkable  KIDNEYS/URETERS:  Left renal cortical 1 cm cyst   Symmetric nephrographic phase enhancement of the kidneys  No obstructive uropathy  STOMACH AND BOWEL: Small hiatal hernia  Diverticulosis without evidence of diverticulitis or colitis  No bowel obstruction  APPENDIX:  Prior appendectomy  ABDOMINOPELVIC CAVITY:  No ascites  No pneumoperitoneum  No lymphadenopathy  VESSELS:  No abdominal aortic aneurysm  PELVIS REPRODUCTIVE ORGANS:  No prostate enlargement  URINARY BLADDER:  Unremarkable  ABDOMINAL WALL/INGUINAL REGIONS:  Unremarkable  OSSEOUS STRUCTURES:  No acute fracture or destructive osseous lesion  Impression: No evidence of bowel obstruction  Diverticulosis without evidence of diverticulitis or colitis  No findings to suggest proctitis  Workstation performed: [id number]       Orders  No orders of the defined types were placed in this encounter

## 2022-08-11 ENCOUNTER — HOSPITAL ENCOUNTER (EMERGENCY)
Facility: HOSPITAL | Age: 62
Discharge: HOME | End: 2022-08-11
Attending: EMERGENCY MEDICINE
Payer: COMMERCIAL

## 2022-08-11 ENCOUNTER — TELEPHONE (OUTPATIENT)
Dept: NEUROLOGY | Facility: CLINIC | Age: 62
End: 2022-08-11

## 2022-08-11 VITALS
WEIGHT: 230 LBS | HEART RATE: 60 BPM | BODY MASS INDEX: 34.86 KG/M2 | RESPIRATION RATE: 13 BRPM | HEIGHT: 68 IN | OXYGEN SATURATION: 96 % | TEMPERATURE: 96.7 F | SYSTOLIC BLOOD PRESSURE: 177 MMHG | DIASTOLIC BLOOD PRESSURE: 91 MMHG

## 2022-08-11 DIAGNOSIS — I10 HTN (HYPERTENSION): ICD-10-CM

## 2022-08-11 DIAGNOSIS — F41.9 ANXIETY DISORDER: Primary | ICD-10-CM

## 2022-08-11 LAB
ATRIAL RATE: 57 BPM
P AXIS: 56 DEGREES
PR INTERVAL: 146 MS
QRS AXIS: 19 DEGREES
QRSD INTERVAL: 86 MS
QT INTERVAL: 654 MS
QTC INTERVAL: 636 MS
T WAVE AXIS: 20 DEGREES
VENTRICULAR RATE: 57 BPM

## 2022-08-11 PROCEDURE — 93010 ELECTROCARDIOGRAM REPORT: CPT | Performed by: INTERNAL MEDICINE

## 2022-08-11 PROCEDURE — 99285 EMERGENCY DEPT VISIT HI MDM: CPT | Performed by: EMERGENCY MEDICINE

## 2022-08-11 PROCEDURE — 93005 ELECTROCARDIOGRAM TRACING: CPT

## 2022-08-11 PROCEDURE — 99284 EMERGENCY DEPT VISIT MOD MDM: CPT

## 2022-08-11 RX ORDER — LORAZEPAM 1 MG/1
0.5 TABLET ORAL EVERY 8 HOURS PRN
Qty: 5 TABLET | Refills: 0 | Status: SHIPPED | OUTPATIENT
Start: 2022-08-11 | End: 2022-08-16 | Stop reason: SDUPTHER

## 2022-08-11 NOTE — ED PROVIDER NOTES
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, tremors, recently admitted for chest pain on July 31st presenting to the ED with anxiety and hoping to get an MRI that his scheduled next week done today  Patient states he feels like he has a little bit more brain fog and not his baseline, he is anxious given his previous medical history and would like to see if he has any brain pathology  Patient is worried because he has been having persistent tingling in his bilateral forearms and hands, and last night he noticed when typing on the computer that his hands are not working as good, with his right side less so than his left  However when pressed about the issue patient states he had residual weakness on that right side from his strokes, and he is just anxious wanting the MRI done or a scan ideally today  Patient denies chest pain, shortness of breath, vomiting, syncope, confusion  Prior to Admission Medications   Prescriptions Last Dose Informant Patient Reported?  Taking?   acetaminophen (TYLENOL) 500 mg tablet  Self Yes No   Sig: Take 500 mg by mouth every 6 (six) hours as needed for mild pain 2 tab   amLODIPine (NORVASC) 2 5 mg tablet  Self No No   Sig: Take 1 tablet (2 5 mg total) by mouth daily   amoxicillin (AMOXIL) 500 mg capsule  Self Yes No   Patient not taking: No sig reported   atorvastatin (LIPITOR) 40 mg tablet  Self No No   Sig: Take 1 tablet (40 mg total) by mouth daily with dinner   carvedilol (COREG) 12 5 mg tablet  Self No No   Sig: Take 1 tablet (12 5 mg total) by mouth 2 (two) times a day with meals   diltiazem (CARDIZEM CD) 120 mg 24 hr capsule  Self No No   Sig: Take 1 capsule (120 mg total) by mouth daily   famotidine (PEPCID) 40 MG tablet  Self No No   Sig: TAKE 1 TABLET BY MOUTH EVERY DAY   furosemide (LASIX) 20 mg tablet  Self No No   Sig: TAKE 1 TABLET BY MOUTH EVERY DAY   pantoprazole (PROTONIX) 40 mg tablet  Self No No   Sig: TAKE 1 TABLET BY MOUTH EVERY DAY   Patient taking differently: 2 (two) times a day   potassium chloride (Klor-Con) 10 mEq tablet  Self No No   Sig: Take 1 tablet daily for low potassium   warfarin (COUMADIN) 3 mg tablet  Self No No   Sig: Do not fill continue to take as prior plan   warfarin (COUMADIN) 5 mg tablet  Self No No   Sig: CONTINUE TO TAKE AS PRIOR DO NOT FILL      Facility-Administered Medications: None       Past Medical History:   Diagnosis Date    Aneurysm of thoracic aorta (HCC)     last assessed 11/20/17    Anxiety     currently on no meds    Arthritis     Bilateral inguinal hernia     Cardiac disease     Depression     GERD (gastroesophageal reflux disease)     Hearing loss of aging     Hyperlipidemia     Hypertension     Irritable bowel syndrome     Kidney stone     Obesity     Occasional tremors     left arm since stroke    Palpitations     PONV (postoperative nausea and vomiting)     and headache x 3 days    Sciatica     right and left  side    Shortness of breath     on exertion    Sleep apnea     is not using a CPAP    Spitting up blood 05/21/2021    Resolved    Status post placement of implantable loop recorder     Stroke ([facility] [address] ) 11/2014    Stroke ([facility] [address] ) 03/2021    TIA (transient ischemic attack)        Past Surgical History:   Procedure Laterality Date    AORTA SURGERY      thoracic - aneurysmorrhaphy    APPENDECTOMY      ASCENDING AORTIC ANEURYSM REPAIR      resolved 8/19/15    CARDIAC LOOP RECORDER      CHOLECYSTECTOMY      laparoscopic    COLONOSCOPY      CYSTOSCOPY      with removal of object- stent removal    KNEE ARTHROSCOPY Right 1996    with medial meniscus repair    LITHOTRIPSY      renal  ORTHOPEDIC SURGERY      WA FRAGMENT KIDNEY STONE/ ESWL Left 5/17/2018    Procedure: LITHROTRIPSY EXTRACORPORAL SHOCKWAVE (ESWL); Surgeon: Yuliet Theodore MD;  [facility]: [facility];  Service: Urology    [place] [address] Bilateral 12/9/2021    Procedure: ROBOTIC REPAIR HERNIA INGUINAL;  Surgeon: Evan Taylor MD;  Location: [facility];  Service: General    THUMB ARTHROSCOPY Right 1976    ligament repair    UPPER GASTROINTESTINAL ENDOSCOPY         Family History   Problem Relation Age of Onset    Diverticulitis Mother         of colon    Nephrolithiasis Mother     Emphysema Father     Nephrolithiasis Sister     Heart attack Maternal Grandfather 72    Breast cancer Paternal Grandmother     Lung cancer Paternal Grandfather     Cancer Family     Coronary artery disease Family     Diabetes Family         sibling    Hypertension Family         sibling    Hernia Family         sibling     I have reviewed and agree with the history as documented  E-Cigarette/Vaping    E-Cigarette Use Never User      E-Cigarette/Vaping Substances    Nicotine No     THC No     CBD No     Flavoring No     Other No     Unknown No      Social History     Tobacco Use    Smoking status: Never Smoker    Smokeless tobacco: Never Used    Tobacco comment: no second hand smoke exposure   Vaping Use    Vaping Use: Never used   Substance Use Topics    Alcohol use: Not Currently    Drug use: No        Review of Systems   Constitutional: Negative for chills, diaphoresis, fatigue and fever  HENT: Negative for sore throat  Eyes: Negative for photophobia and pain  States his vision is blurry, however this has been his baseline for the last several months   Respiratory: Negative for chest tightness and shortness of breath  Cardiovascular: Negative for chest pain and leg swelling  Gastrointestinal: Positive for nausea   Negative for abdominal distention, anal bleeding, blood in stool, diarrhea, rectal pain and vomiting  Dark stools, saw his gastroenterologist yesterday who advised conservative management, likely the stools were told that they were from his medication use, also stated that any blood from rectum is likely secondary to a internal hemorrhoid that can be followed up at a later date   Genitourinary: Negative for difficulty urinating and dysuria  Musculoskeletal: Negative for joint swelling and myalgias  Hand stiffness   Skin: Negative for rash  Neurological: Negative for syncope  Pins and needles and bilateral forearms and hands, right hand weakness   Psychiatric/Behavioral: Negative for confusion  Physical Exam  ED Triage Vitals   Temperature Pulse Respirations Blood Pressure SpO2   08/11/22 1430 08/11/22 1318 08/11/22 1318 08/11/22 1318 08/11/22 1318   (!) 96 7 °F (35 9 °C) 61 18 (!) 171/83 96 %      Temp Source Heart Rate Source Patient Position - Orthostatic VS BP Location FiO2 (%)   08/11/22 1430 08/11/22 1318 08/11/22 1318 08/11/22 1318 --   Oral Monitor Sitting Left arm       Pain Score       --                    Orthostatic Vital Signs  Vitals:    08/11/22 1338 08/11/22 1400 08/11/22 1430 08/11/22 1515   BP: 170/84 158/83 (!) 172/92 (!) 177/91   Pulse: 62 64 62 60   Patient Position - Orthostatic VS: Sitting Sitting Sitting Sitting       Physical Exam  Constitutional:       Comments: Chronically ill-appearing   HENT:      Head: Normocephalic and atraumatic  Right Ear: External ear normal       Left Ear: External ear normal       Nose: Nose normal       Mouth/Throat:      Mouth: Mucous membranes are moist       Pharynx: Oropharynx is clear  Eyes:      Conjunctiva/sclera: Conjunctivae normal       Pupils: Pupils are equal, round, and reactive to light  Cardiovascular:      Rate and Rhythm: Normal rate  Pulses: Normal pulses  Heart sounds: Normal heart sounds     Pulmonary:      Effort: Pulmonary effort is normal  No respiratory distress  Breath sounds: Normal breath sounds  Abdominal:      Palpations: Abdomen is soft  Tenderness: There is no abdominal tenderness  There is no right CVA tenderness, left CVA tenderness or guarding  Musculoskeletal:         General: No swelling  Cervical back: Neck supple  Right lower leg: No edema  Left lower leg: No edema  Neurological:      General: No focal deficit present  Mental Status: He is alert and oriented to person, place, and time  Cranial Nerves: No cranial nerve deficit  Comments: Some tremulous noted in upper and lower extremities   Psychiatric:      Comments: Anxious         ED Medications  Medications - No data to display    Diagnostic Studies  Results Reviewed     None                 No orders to display         Procedures  Procedures      ED Course           MDM  Number of Diagnoses or Management Options  Anxiety disorder  HTN (hypertension)  Diagnosis management comments:  71-year-old male past medical history of AFib 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, 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, tremors, recently admitted for chest pain on July 31st presenting to the ED with anxiety and hoping to get an MRI that his scheduled next week done today  During patient physical examination, was able to ascertain that the patient is anxious and would like the MRI that is scheduled outpatient to happen today  Radiology called see there is availability to slide him into the schedule  There 1st availability is 930 p m  However if a stroke was called required he would have to wait even longer  Patient unwilling to wait that long, however very anxious  Told to follow-up with primary care provider, for anxiety and other associated symptoms  Patient given good return precautions if his symptoms change or if he has any symptoms that are concerning    Patient given 5 1 mg Ativan tablets to his pharmacy to help with anxiety, was told specifically that he will be not getting any more Ativan from the emergency room, that he needs to follow up with primary care or a psychiatric mental health provider  Disposition  Final diagnoses:   Anxiety disorder   HTN (hypertension)     Time reflects when diagnosis was documented in both MDM as applicable and the Disposition within this note     Time User Action Codes Description Comment    8/11/2022  3:02 PM Liana Bernheim Add [F41 9] Anxiety disorder     8/11/2022  3:15 PM Gudelia KAMARA Add [I10] HTN (hypertension)       ED Disposition     ED Disposition   Discharge    Condition   Stable    Date/Time   Thu Aug 11, 2022  3:15 PM    Obdulia 38 discharge to home/self care                 Follow-up Information     Follow up With Specialties Details Why Contact Info    Dina Tripathi MD Family Medicine Call  about symptoms and medication management [address] [place] [id number] [phone]            Discharge Medication List as of 8/11/2022  3:15 PM      START taking these medications    Details   LORazepam (Ativan) 1 mg tablet Take 0 5 tablets (0 5 mg total) by mouth every 8 (eight) hours as needed for anxiety for up to 5 doses, Starting Thu 8/11/2022, Normal         CONTINUE these medications which have NOT CHANGED    Details   acetaminophen (TYLENOL) 500 mg tablet Take 500 mg by mouth every 6 (six) hours as needed for mild pain 2 tab, Historical Med      amLODIPine (NORVASC) 2 5 mg tablet Take 1 tablet (2 5 mg total) by mouth daily, Starting Mon 8/8/2022, Normal      amoxicillin (AMOXIL) 500 mg capsule Starting Wed 10/13/2021, Historical Med      atorvastatin (LIPITOR) 40 mg tablet Take 1 tablet (40 mg total) by mouth daily with dinner, Starting Thu 6/30/2022, Normal      carvedilol (COREG) 12 5 mg tablet Take 1 tablet (12 5 mg total) by mouth 2 (two) times a day with meals, Starting Sun 7/31/2022, Normal      diltiazem (CARDIZEM CD) 120 mg 24 hr capsule Take 1 capsule (120 mg total) by mouth daily, Starting Mon 8/1/2022, Normal      famotidine (PEPCID) 40 MG tablet TAKE 1 TABLET BY MOUTH EVERY DAY, Normal      furosemide (LASIX) 20 mg tablet TAKE 1 TABLET BY MOUTH EVERY DAY, Normal      pantoprazole (PROTONIX) 40 mg tablet TAKE 1 TABLET BY MOUTH EVERY DAY, Normal      potassium chloride (Klor-Con) 10 mEq tablet Take 1 tablet daily for low potassium, Normal      !! warfarin (COUMADIN) 3 mg tablet Do not fill continue to take as prior plan, No Print      !! warfarin (COUMADIN) 5 mg tablet CONTINUE TO TAKE AS PRIOR DO NOT FILL, Normal       !! - Potential duplicate medications found  Please discuss with provider  No discharge procedures on file  PDMP Review       Value Time User    PDMP Reviewed  Yes 7/30/2022  2:31 AM Delphine Cornejo DO           ED Provider  Attending physically available and evaluated Keaton Rodriguez  I managed the patient along with the ED Attending      Electronically Signed by         Santosh Gonzalez DO  08/11/22 2100

## 2022-08-11 NOTE — DISCHARGE INSTRUCTIONS
Your seen here in the emergency department for concerns of anxiety  We called over to radiology stated that you would be unable to get a MRI done today until 9:00 p m   It is not certain that you would be able to get the MRI done, so we elected for you to just have it done next week  Given prescribed Ativan for anxiety please do not drive or operate machinery using this drug  You may take 1 pill every 8 hours for anxiety symptoms  Please follow-up with your primary care physician or other physician about anxiety symptoms, they can ideally starting any medication help with your anxiety  You have new or worsening symptoms please return to the emergency room for evaluation  It was noted that your hypertensive on examination, please follow-up your primary care physician to ensure your hypertension is adequately treated

## 2022-08-11 NOTE — ED NOTES
Pt states symptoms worsened after medication changes during a recent admission  Pt admits to being anxious, states symptoms might be from that        Hong Conroy RN  08/11/22 9181

## 2022-08-11 NOTE — ED ATTENDING ATTESTATION
8/11/2022  I, Gwen Briceno MD, saw and evaluated the patient  I have discussed the patient with the resident/non-physician practitioner and agree with the resident's/non-physician practitioner's findings, Plan of Care, and MDM as documented in the resident's/non-physician practitioner's note, except where noted  All available labs and Radiology studies were reviewed  I was present for key portions of any procedure(s) performed by the resident/non-physician practitioner and I was immediately available to provide assistance  At this point I agree with the current assessment done in the Emergency Department  I have conducted an independent evaluation of this patient a history and physical is as follows:  Pt has been having some blood in stool but saw gi and was sent home  Pt has previous history of cva Pt saw pmd last week and was scheduled for mri Pt has been having symptoms bilateral forearm and hand numbness brain fog and ataxia for one month since change in bp meds   Pt has not been having any change in symptoms today and feels anxious and would like to have mri done early Pt states he is up at night pacing unable to sleep Pt denies si or hi Pt does not want admission to psyche floor PE: falert nad heart reg lungs clear abd soft nontender ext and neuro nonfocal MDM: mri would be 9:30 pt does nto want to wait will give ativan    ED Course         Critical Care Time  Procedures

## 2022-08-11 NOTE — TELEPHONE ENCOUNTER
PATIENT LEFT VOICEMAIL THAT SINCE HE WAS PUT ON MEDICATION AND DISCHARGED FROM THE HOSPITAL HIS HEAD  FEELS FUZZY ALL THE TIME SINCE STARTING A NEW MEDICATION FOR THE PAST WEEK  REQUESTING CALL BACK [phone]  Patient of [facility] last visit March 30th  He is status post admission July 29th for chest pain  Discharge note documents he was reporting dizziness since change of medications  Discharge instructions indicate follow-up with Cardiology and primary care physician  Neurology was not consulted  MRI of the brain was ordered by primary care and scheduled for August 17  Patient said he feels he may have had a TIA but is unsure  Currently denies signs symptoms of stroke but educated on same and he will go to the emergency room with symptoms  I recommended he contact his primary care physician with any updates on symptoms and I scheduled follow-up with Yu Wan on August 19th for further assessment  All questions concerns addressed at this time

## 2022-08-15 ENCOUNTER — APPOINTMENT (OUTPATIENT)
Dept: LAB | Facility: HOSPITAL | Age: 62
End: 2022-08-15
Payer: COMMERCIAL

## 2022-08-15 ENCOUNTER — ANTICOAG VISIT (OUTPATIENT)
Dept: FAMILY MEDICINE CLINIC | Facility: CLINIC | Age: 62
End: 2022-08-15

## 2022-08-15 DIAGNOSIS — E87.6 HYPOKALEMIA: ICD-10-CM

## 2022-08-15 PROBLEM — R07.89 ATYPICAL CHEST PAIN: Status: RESOLVED | Noted: 2022-07-30 | Resolved: 2022-07-31

## 2022-08-15 LAB
INR PPP: 3.11 (ref 0.84–1.19)
POTASSIUM SERPL-SCNC: 3.4 MMOL/L (ref 3.5–5.3)

## 2022-08-15 PROCEDURE — 84132 ASSAY OF SERUM POTASSIUM: CPT

## 2022-08-15 NOTE — ASSESSMENT & PLAN NOTE
· MIGUELITO 2, atypical pain , MI type 2 ruled out   · Presenting with ongoing substernal chest pain not palpable  · Possible GI in nature versus secondary to elevated blood pressure verses anxiety  · Pt takes PPi will increase to bid for now   · Follow-up troponin are negative ,   · monitor on telemetry  · Reassess pain - discussed with cardiology will monitor over night as pt with symptoms of "dizziness"

## 2022-08-16 ENCOUNTER — OFFICE VISIT (OUTPATIENT)
Dept: FAMILY MEDICINE CLINIC | Facility: CLINIC | Age: 62
End: 2022-08-16
Payer: COMMERCIAL

## 2022-08-16 VITALS
HEIGHT: 68 IN | HEART RATE: 66 BPM | WEIGHT: 228.4 LBS | SYSTOLIC BLOOD PRESSURE: 152 MMHG | BODY MASS INDEX: 34.62 KG/M2 | DIASTOLIC BLOOD PRESSURE: 90 MMHG

## 2022-08-16 DIAGNOSIS — Z79.01 ANTICOAGULATED ON COUMADIN: ICD-10-CM

## 2022-08-16 DIAGNOSIS — N20.0 KIDNEY STONES: ICD-10-CM

## 2022-08-16 DIAGNOSIS — R07.9 CHEST PAIN: ICD-10-CM

## 2022-08-16 DIAGNOSIS — I48.0 PAROXYSMAL ATRIAL FIBRILLATION (HCC): Primary | ICD-10-CM

## 2022-08-16 DIAGNOSIS — R42 DIZZINESS: ICD-10-CM

## 2022-08-16 DIAGNOSIS — E87.6 HYPOKALEMIA: ICD-10-CM

## 2022-08-16 DIAGNOSIS — F41.9 ANXIETY DISORDER: ICD-10-CM

## 2022-08-16 DIAGNOSIS — I10 PRIMARY HYPERTENSION: ICD-10-CM

## 2022-08-16 PROCEDURE — 99214 OFFICE O/P EST MOD 30 MIN: CPT | Performed by: FAMILY MEDICINE

## 2022-08-16 PROCEDURE — 1111F DSCHRG MED/CURRENT MED MERGE: CPT | Performed by: FAMILY MEDICINE

## 2022-08-16 PROCEDURE — 3725F SCREEN DEPRESSION PERFORMED: CPT | Performed by: FAMILY MEDICINE

## 2022-08-16 RX ORDER — MIRTAZAPINE 7.5 MG/1
7.5 TABLET, FILM COATED ORAL
Qty: 30 TABLET | Refills: 0 | Status: SHIPPED | OUTPATIENT
Start: 2022-08-16 | End: 2022-08-30 | Stop reason: SDUPTHER

## 2022-08-16 RX ORDER — CARVEDILOL 12.5 MG/1
12.5 TABLET ORAL 2 TIMES DAILY WITH MEALS
Qty: 60 TABLET | Refills: 0 | Status: SHIPPED | OUTPATIENT
Start: 2022-08-16 | End: 2022-09-14

## 2022-08-16 RX ORDER — LORAZEPAM 0.5 MG/1
0.5 TABLET ORAL EVERY 8 HOURS PRN
Qty: 30 TABLET | Refills: 0 | Status: SHIPPED | OUTPATIENT
Start: 2022-08-16 | End: 2022-08-31

## 2022-08-16 NOTE — ASSESSMENT & PLAN NOTE
Patient is currently on 20 mEq daily  He does take this at lunchtime  This is quite reasonable  Continues

## 2022-08-16 NOTE — ASSESSMENT & PLAN NOTE
BP today is not at goal, but as he was having some changes recently, will recheck in about 2 weeks and reeval for adjustment  Concerned about hypotension if increase meds today

## 2022-08-16 NOTE — PROGRESS NOTES
Assessment and Plan:    Problem List Items Addressed This Visit     Anticoagulated on Coumadin     Patient is currently on Coumadin  Was recently adjusted by this office to adjust the INR slightly lower than 3  Anxiety disorder     Patient is having some significant issues with regard to anxiety  He did wonder about starting on mirtazapine as 1 of his friends mentioned that that helped her quite a bit, and did not have addicting side effects, which is quite true  Reviewed potential side effects including sedation, dry mouth, weight gain  At this point, would recommend that he start on the mirtazapine 7 5 at HS  He can also continue on the lorazepam at that point, if he chooses  As far as lorazepam is concerned, we will adjust the strength to 0 5 mg tablets, and he can take 1/2-1 tablet every 8 hours as needed, as when he took a 0 5 mg dose prior, he would fall asleep  PDMP reviewed, and prescription given  This is a short-term prescription, therefore we did not do a contract at this time  Relevant Medications    LORazepam (Ativan) 0 5 mg tablet    mirtazapine (REMERON) 7 5 MG tablet    Atrial fibrillation (HCC) - Primary     Heart rate today was slightly low, but also stable and regular  Currently on amlodipine, which should not affect heart rate  Previously, he was given Cardizem in the hospital which could affect heart rate  Is also switched from metoprolol to carvedilol, which should also decrease the effects on heart rate  Recommend follow-up with Cardiology  Relevant Medications    carvedilol (COREG) 12 5 mg tablet    Dizziness     Likely secondary to low blood pressure due to potential decreased heart rate, combined with anxiety  Significant adjustments were made at his 1st hospitalization at the end of July, and then again in the ER he had re-evaluation, and has had some changes at this office due to side effects with some of the medications    Based on that, would try to limit the amount of adjustments in medication that I would make at this point to see what his blood pressure does in the short run, and see how that affects in with the dizziness  Relevant Medications    carvedilol (COREG) 12 5 mg tablet    Hypertension     BP today is not at goal, but as he was having some changes recently, will recheck in about 2 weeks and reeval for adjustment  Concerned about hypotension if increase meds today  Relevant Medications    carvedilol (COREG) 12 5 mg tablet    Hypokalemia     Patient is currently on 20 mEq daily  He does take this at lunchtime  This is quite reasonable  Continues  Kidney stones     Patient does continue to have some flank pain noted  Ultrasound previously was negative  Will continue to follow  Other Visit Diagnoses     Chest pain        likely due to anxiety  Has appointment with Cardio  Should follow with their recommendations  In hospital, did not do stress  Relevant Medications    carvedilol (COREG) 12 5 mg tablet                 Diagnoses and all orders for this visit:    Paroxysmal atrial fibrillation ([facility] [address] )    Primary hypertension  -     carvedilol (COREG) 12 5 mg tablet; Take 1 tablet (12 5 mg total) by mouth 2 (two) times a day with meals    Dizziness  -     carvedilol (COREG) 12 5 mg tablet; Take 1 tablet (12 5 mg total) by mouth 2 (two) times a day with meals    Anxiety disorder  -     LORazepam (Ativan) 0 5 mg tablet; Take 1 tablet (0 5 mg total) by mouth every 8 (eight) hours as needed for anxiety for up to 30 doses  -     mirtazapine (REMERON) 7 5 MG tablet; Take 1 tablet (7 5 mg total) by mouth daily at bedtime    Chest pain  Comments:  likely due to anxiety  Has appointment with Cardio  Should follow with their recommendations  In hospital, did not do stress  Orders:  -     carvedilol (COREG) 12 5 mg tablet;  Take 1 tablet (12 5 mg total) by mouth 2 (two) times a day with meals    Anticoagulated on Coumadin    Hypokalemia    Kidney stones            Subjective:      Patient ID: Jt Daily is a 58 y o  male  CC:    Chief Complaint   Patient presents with    Follow-up     Pt is present today for [facility] ED visit follow up 8/11/22, dx Hypertension, anxiety       HPI:    Here to follow up after recent eval in the ER  Had testing, and was kept for observation  He felt worse after discharge  Has had chest pains, fuzziness in head, some changes with walking, and some changes in vision  Had change from metorpolol to Carvedilol, and from Norvasc to Cardizem  Was then changed back to Amlodipine  Had gotten Lorazepam in the ER, and has helped a bit  Helped with sleep  Not able to sleep through the night, but is better with getting to sleep than before  The following portions of the patient's history were reviewed and updated as appropriate: allergies, current medications, past family history, past medical history, past social history, past surgical history and problem list       Review of Systems   Constitutional: Negative  HENT: Negative  Respiratory: Negative  Cardiovascular: Positive for chest pain  Negative for palpitations  Gastrointestinal: Negative  Musculoskeletal: Negative  Allergic/Immunologic: Negative  Neurological: Negative  Psychiatric/Behavioral: Positive for decreased concentration, dysphoric mood and sleep disturbance  Negative for suicidal ideas  The patient is nervous/anxious  All other systems reviewed and are negative          Data to review:       Objective:    Vitals:    08/16/22 0751   BP: 152/90   BP Location: Right arm   Patient Position: Sitting   Cuff Size: Standard   Pulse: 66   Weight: 104 kg (228 lb 6 4 oz)   Height: 5' 8" (1 727 m)        Physical Exam

## 2022-08-16 NOTE — ASSESSMENT & PLAN NOTE
Patient is currently on Coumadin  Was recently adjusted by this office to adjust the INR slightly lower than 3

## 2022-08-16 NOTE — PATIENT INSTRUCTIONS
Problem List Items Addressed This Visit       Anticoagulated on Coumadin     Patient is currently on Coumadin  Was recently adjusted by this office to adjust the INR slightly lower than 3  Anxiety disorder     Patient is having some significant issues with regard to anxiety  He did wonder about starting on mirtazapine as 1 of his friends mentioned that that helped her quite a bit, and did not have addicting side effects, which is quite true  Reviewed potential side effects including sedation, dry mouth, weight gain  At this point, would recommend that he start on the mirtazapine 7 5 at HS  He can also continue on the lorazepam at that point, if he chooses  As far as lorazepam is concerned, we will adjust the strength to 0 5 mg tablets, and he can take 1/2-1 tablet every 8 hours as needed, as when he took a 0 5 mg dose prior, he would fall asleep  PDMP reviewed, and prescription given  This is a short-term prescription, therefore we did not do a contract at this time  Atrial fibrillation ([address] ) - Primary     Heart rate today was slightly low, but also stable and regular  Currently on amlodipine, which should not affect heart rate  Previously, he was given Cardizem in the hospital which could affect heart rate  Is also switched from metoprolol to carvedilol, which should also decrease the effects on heart rate  Recommend follow-up with Cardiology  Dizziness     Likely secondary to low blood pressure due to potential decreased heart rate, combined with anxiety  Significant adjustments were made at his 1st hospitalization at the end of July, and then again in the ER he had re-evaluation, and has had some changes at this office due to side effects with some of the medications    Based on that, would try to limit the amount of adjustments in medication that I would make at this point to see what his blood pressure does in the short run, and see how that affects in with the dizziness  Hypertension     BP today is not at goal, but as he was having some changes recently, will recheck in about 2 weeks and reeval for adjustment  Concerned about hypotension if increase meds today  Hypokalemia     Patient is currently on 20 mEq daily  He does take this at lunchtime  This is quite reasonable  Continues  Kidney stones     Patient does continue to have some flank pain noted  Ultrasound previously was negative  Will continue to follow  Other Visit Diagnoses       Chest pain        likely due to anxiety  Has appointment with Cardio  Should follow with their recommendations  In hospital, did not do stress  COVID 19 Instructions    Hans Christina 22 was advised to limit contact with others to essential tasks such as getting food, medications, and medical care  Proper handwashing reviewed, and Hand sanitzer when washing is not available  If the patient develops symptoms of COVID 19, the patient should call the office as soon as possible  For 3870-1070 Flu season, it is strongly recommended that Flu Vaccinations be obtained  Virtual Visits:  Cr: This works on smart phones (any phone with Internet browsing capability)  You should get a text message when the provider is ready to see you  Click on the link in the text message, and the call should start  You will need to type in your name, and allow camera and microphone access  This is HIPPA compliant, and secure  If you have not already done so, get immunized to COVID 19  We are committed to getting you vaccinated as soon as possible and will be closely following CDC and [facility]  guidelines as they are released and revised  Please refer to our COVID-19 vaccine webpage for the most up to date information on the vaccine and our distribution efforts      This site will also have the most up to date recommendations for COVID booster sandi Garcia tn    Call [phone]), option [phone]    OUR NEW LOCATION:    [address], [facility] [address], [place], [address]  Fax: [phone]    Lab services and OB/GYN are at this location as well

## 2022-08-16 NOTE — ASSESSMENT & PLAN NOTE
Patient does continue to have some flank pain noted  Ultrasound previously was negative  Will continue to follow

## 2022-08-16 NOTE — ASSESSMENT & PLAN NOTE
Likely secondary to low blood pressure due to potential decreased heart rate, combined with anxiety  Significant adjustments were made at his 1st hospitalization at the end of July, and then again in the ER he had re-evaluation, and has had some changes at this office due to side effects with some of the medications  Based on that, would try to limit the amount of adjustments in medication that I would make at this point to see what his blood pressure does in the short run, and see how that affects in with the dizziness

## 2022-08-16 NOTE — ASSESSMENT & PLAN NOTE
Patient is having some significant issues with regard to anxiety  He did wonder about starting on mirtazapine as 1 of his friends mentioned that that helped her quite a bit, and did not have addicting side effects, which is quite true  Reviewed potential side effects including sedation, dry mouth, weight gain  At this point, would recommend that he start on the mirtazapine 7 5 at HS  He can also continue on the lorazepam at that point, if he chooses  As far as lorazepam is concerned, we will adjust the strength to 0 5 mg tablets, and he can take 1/2-1 tablet every 8 hours as needed, as when he took a 0 5 mg dose prior, he would fall asleep  PDMP reviewed, and prescription given  This is a short-term prescription, therefore we did not do a contract at this time

## 2022-08-16 NOTE — ASSESSMENT & PLAN NOTE
Heart rate today was slightly low, but also stable and regular  Currently on amlodipine, which should not affect heart rate  Previously, he was given Cardizem in the hospital which could affect heart rate  Is also switched from metoprolol to carvedilol, which should also decrease the effects on heart rate  Recommend follow-up with Cardiology

## 2022-08-18 ENCOUNTER — OFFICE VISIT (OUTPATIENT)
Dept: CARDIOLOGY CLINIC | Facility: CLINIC | Age: 62
End: 2022-08-18
Payer: COMMERCIAL

## 2022-08-18 VITALS
BODY MASS INDEX: 34.97 KG/M2 | SYSTOLIC BLOOD PRESSURE: 138 MMHG | WEIGHT: 230 LBS | DIASTOLIC BLOOD PRESSURE: 90 MMHG | HEART RATE: 80 BPM

## 2022-08-18 DIAGNOSIS — I10 PRIMARY HYPERTENSION: ICD-10-CM

## 2022-08-18 DIAGNOSIS — I48.0 PAROXYSMAL ATRIAL FIBRILLATION (HCC): Primary | ICD-10-CM

## 2022-08-18 DIAGNOSIS — R07.89 ATYPICAL CHEST PAIN: ICD-10-CM

## 2022-08-18 PROCEDURE — 99214 OFFICE O/P EST MOD 30 MIN: CPT | Performed by: INTERNAL MEDICINE

## 2022-08-18 NOTE — TELEPHONE ENCOUNTER
Patient left voicemail that he went to have the MRI done and machine was not working so they were not able to complete the study  Said he has appointment scheduled with Darrell Redd August 19th and would like to keep that appointment even though MRI is not done  He will see Darrell Redd on August 19

## 2022-08-18 NOTE — PROGRESS NOTES
Cardiology Office Follow Up  Venice Easley  1960  [id number]    ASSESSMENT:  Chronic atypical chest pain   - negative cardiac enzymes with most recent high sensitivity troponin trend: 14,16  Dizziness  Hypertension   - noted allergy to losartan and lisinopril  - current Rx, amlodipine 2 5 mg daily, carvedilol 12 5 mg twice daily, furosemide 20 mg daily  Bicuspid aortic valve with mild aortic valve stenosis  History of significant ascending aortic aneurysm   - status post replacement with hemiarch construction in 2014  Paroxysmal atrial fibrillation   - noted postoperative in 2014 without objective recurrence  Prior lacunar infarcts on MRI of the brain in January 2020   - with recurrent stroke in March 2020 while on Xarelto, subsequently switched to [facility] with subsequent CVA on MRI noted in May 2021, thus switched to warfarin  Antiphospholipid antibody syndrome, positive lupus anticoagulant antibodies  Dyslipidemia   Hypokalemia  Anxiety  GERD    PLAN:  · Continue amlodipine 2 5 mg daily, carvedilol 12 5 mg twice daily, furosemide 20 mg daily  · Patient has not had any recurrent chest pain or dizziness  When discussing his recent presentation to the [facility], he could not remember all the events that occurred  · Patient notes significant anxiety thus medications were adjusted by his PCP  · At this time, will hold on further antihypertensive regimen adjustments  Blood pressure 138/90 during office visit  · Continued on warfarin in light of antiphospholipid antibody syndrome, atrial fibrillation  · Continue statin therapy with atorvastatin 40 mg daily  Repeat lab studies had been ordered per PCP  · Patient will follow-up with Dr Vania Henley in approximately 4-6 weeks  Interval History/ HPI:   58-year-old male recently presented to [facility] with complaints of chest pain and dizziness   He stated that he was at a laundromat and was walking to his car when he developed central chest pain at that he rated as a 6/10 on the pain scale  The pain did not radiate but was associated with diaphoresis  He never had this pain before and it was different than his GERD like symptoms from the past  Nothing made the pain better or worse which prompted patient to come to the emergency department for evaluation  In patient notes that his dizziness has been going on for approximately 1 month  He denies room spinning and describes a sensation where he has to stop and gather himself when the dizziness occurs  He has not syncopized  Upon office visit today, patient has been doing well from a cardiac standpoint  Patient denies chest pain, dizziness, lightheadedness, syncope, presyncope, palpitations  Patient states that at times if he is walking long distances or upstairs for a while, he gets short of breath due to deconditioning and being overweight  He notes that he had gotten a tingling sensation in his upper abdomen today warranting belching  He denies lower extremity edema  The above medications were reviewed with the patient in detail       Vitals:  /90 (BP Location: Right arm, Patient Position: Sitting, Cuff Size: Large)   Pulse 80   Wt 104 kg (230 lb)   BMI 34 97 kg/m²     Past Medical History:   Diagnosis Date    Aneurysm of thoracic aorta (HCC)     last assessed 11/20/17    Anxiety     currently on no meds    Arthritis     Bilateral inguinal hernia     Cardiac disease     Depression     GERD (gastroesophageal reflux disease)     Hearing loss of aging     Hyperlipidemia     Hypertension     Irritable bowel syndrome     Kidney stone     Obesity     Occasional tremors     left arm since stroke    Palpitations     PONV (postoperative nausea and vomiting)     and headache x 3 days    Sciatica     right and left  side    Shortness of breath     on exertion    Sleep apnea     is not using a CPAP    Spitting up blood 05/21/2021    Resolved    Status post placement of implantable loop recorder     Stroke [facility]) 11/2014    Stroke ([address] ) 03/2021    TIA (transient ischemic attack)      Social History     Socioeconomic History    Marital status:      Spouse name: Not on file    Number of children: Not on file    Years of education: Not on file    Highest education level: Not on file   Occupational History    Occupation: disabled     Tobacco Use    Smoking status: Never Smoker    Smokeless tobacco: Never Used    Tobacco comment: no second hand smoke exposure   Vaping Use    Vaping Use: Never used   Substance and Sexual Activity    Alcohol use: Not Currently    Drug use: No    Sexual activity: Not Currently   Other Topics Concern    Not on file   Social History Narrative    Caffeine use    Completed some college     as per [employer] [employer]     Social Determinants of Health     Financial Resource Strain: Not on file   Food Insecurity: Not on file   Transportation Needs: Not on file   Physical Activity: Not on file   Stress: Not on file   Social Connections: Not on file   Intimate Partner Violence: Not on file   Housing Stability: Not on file      Family History   Problem Relation Age of Onset    Diverticulitis Mother         of colon    Nephrolithiasis Mother     Emphysema Father     Nephrolithiasis Sister     Heart attack Maternal Grandfather 72    Breast cancer Paternal Grandmother     Lung cancer Paternal Grandfather     Cancer Family     Coronary artery disease Family     Diabetes Family         sibling    Hypertension Family         sibling    Hernia Family         sibling     Past Surgical History:   Procedure Laterality Date    AORTA SURGERY      thoracic - aneurysmorrhaphy    APPENDECTOMY      ASCENDING AORTIC ANEURYSM REPAIR      resolved 8/19/15    CARDIAC LOOP RECORDER      CHOLECYSTECTOMY      laparoscopic    COLONOSCOPY      CYSTOSCOPY      with removal of object- stent removal    KNEE ARTHROSCOPY Right 1996    with medial meniscus repair    LITHOTRIPSY      renal    ORTHOPEDIC SURGERY      MN FRAGMENT KIDNEY STONE/ ESWL Left 5/17/2018    Procedure: LITHROTRIPSY EXTRACORPORAL SHOCKWAVE (ESWL);   Surgeon: Elsa Yoder MD;  [facility];  Service: Urology    MN [address] Bilateral 12/9/2021    Procedure: ROBOTIC REPAIR HERNIA INGUINAL;  Surgeon: Agustín Gunn MD;  Location: [facility];  Service: General    THUMB ARTHROSCOPY Right 1976    ligament repair    UPPER GASTROINTESTINAL ENDOSCOPY         Current Outpatient Medications:     acetaminophen (TYLENOL) 500 mg tablet, Take 500 mg by mouth every 6 (six) hours as needed for mild pain 2 tab, Disp: , Rfl:     amLODIPine (NORVASC) 2 5 mg tablet, Take 1 tablet (2 5 mg total) by mouth daily, Disp: 30 tablet, Rfl: 5    amoxicillin (AMOXIL) 500 mg capsule, , Disp: , Rfl:     atorvastatin (LIPITOR) 40 mg tablet, Take 1 tablet (40 mg total) by mouth daily with dinner, Disp: 90 tablet, Rfl: 1    carvedilol (COREG) 12 5 mg tablet, Take 1 tablet (12 5 mg total) by mouth 2 (two) times a day with meals, Disp: 60 tablet, Rfl: 0    famotidine (PEPCID) 40 MG tablet, TAKE 1 TABLET BY MOUTH EVERY DAY, Disp: 90 tablet, Rfl: 1    furosemide (LASIX) 20 mg tablet, TAKE 1 TABLET BY MOUTH EVERY DAY, Disp: 30 tablet, Rfl: 2    LORazepam (Ativan) 0 5 mg tablet, Take 1 tablet (0 5 mg total) by mouth every 8 (eight) hours as needed for anxiety for up to 30 doses, Disp: 30 tablet, Rfl: 0    mirtazapine (REMERON) 7 5 MG tablet, Take 1 tablet (7 5 mg total) by mouth daily at bedtime, Disp: 30 tablet, Rfl: 0    pantoprazole (PROTONIX) 40 mg tablet, TAKE 1 TABLET BY MOUTH EVERY DAY (Patient taking differently: 2 (two) times a day), Disp: 90 tablet, Rfl: 1    potassium chloride (K-DUR,KLOR-CON) 20 mEq tablet, Take 1 tablet (20 mEq total) by mouth daily, Disp: 30 tablet, Rfl: 5    warfarin (COUMADIN) 3 mg tablet, Do not fill continue to take as prior plan (Patient taking differently: Do not fill continue to take as prior plan  4mg per pt), Disp: , Rfl: 0    warfarin (COUMADIN) 5 mg tablet, CONTINUE TO TAKE AS PRIOR DO NOT FILL, Disp: 90 tablet, Rfl: 1      Review of Systems:  Review of Systems   Constitutional: Positive for fatigue  Negative for appetite change  Respiratory: Negative for chest tightness, shortness of breath and wheezing  Cardiovascular: Negative for chest pain, palpitations and leg swelling  Gastrointestinal: Negative for abdominal distention  Neurological: Negative for dizziness, syncope, weakness and light-headedness  Psychiatric/Behavioral:        Anxiety        Physical Exam:  Physical Exam  Constitutional:       Comments: Anxious    Cardiovascular:      Rate and Rhythm: Normal rate and regular rhythm  Pulses: Normal pulses  Pulmonary:      Effort: Pulmonary effort is normal  No respiratory distress  Abdominal:      General: Bowel sounds are normal  There is no distension  Musculoskeletal:         General: No swelling  Right lower leg: No edema  Left lower leg: No edema  Skin:     General: Skin is warm  Neurological:      Mental Status: He is alert  Mental status is at baseline  This note was completed in part utilizing [employer] Software  Grammatical errors, random word insertions, spelling mistakes, and incomplete sentences can be an occasional consequence of this system secondary to software limitations, ambient noise, and hardware issues  If you have any questions or concerns about the content, text, or information contained within the body of this dictation, please contact the provider for clarification

## 2022-08-19 ENCOUNTER — TELEPHONE (OUTPATIENT)
Dept: FAMILY MEDICINE CLINIC | Facility: CLINIC | Age: 62
End: 2022-08-19

## 2022-08-19 ENCOUNTER — OFFICE VISIT (OUTPATIENT)
Dept: NEUROLOGY | Facility: CLINIC | Age: 62
End: 2022-08-19

## 2022-08-19 VITALS
HEART RATE: 63 BPM | SYSTOLIC BLOOD PRESSURE: 166 MMHG | HEIGHT: 68 IN | DIASTOLIC BLOOD PRESSURE: 95 MMHG | WEIGHT: 226 LBS | TEMPERATURE: 97.2 F | RESPIRATION RATE: 16 BRPM | BODY MASS INDEX: 34.25 KG/M2

## 2022-08-19 DIAGNOSIS — R25.1 TREMOR: ICD-10-CM

## 2022-08-19 DIAGNOSIS — F41.9 ANXIETY DISORDER: ICD-10-CM

## 2022-08-19 DIAGNOSIS — G47.33 OSA (OBSTRUCTIVE SLEEP APNEA): ICD-10-CM

## 2022-08-19 DIAGNOSIS — Z86.73 HISTORY OF STROKE: Primary | ICD-10-CM

## 2022-08-19 NOTE — PATIENT INSTRUCTIONS
Try the remeron for sleep/anxiety nightly  Get MRI as scheduled- I can look at results of this  I would suggest further diagnostic sleep study/sleep medicine referral in the future as we discussed  Continue with coumadin for stroke prevention  Get upcoming testing and Follow up as scheduled

## 2022-08-19 NOTE — PROGRESS NOTES
Patient ID: Arlene Curran is a 58 y o  male  Assessment/Plan:  Patient Instructions:  Try the remeron for sleep/anxiety nightly  Get MRI as scheduled- I can look at results of this  I would suggest further diagnostic sleep study/sleep medicine referral in the future as we discussed  Continue with coumadin for stroke prevention  Get upcoming testing (EMG) and Follow up as scheduled       Diagnoses and all orders for this visit:    History of stroke    CORIE (obstructive sleep apnea)    Tremor    Anxiety disorder         Subjective:    HPI Noble Aschoff is a 58year old left handed male with past medical history of multiple strokes (failed xarelto and eliquis in the past-now on coumadin last INR 3 11), antiphospholipid syndrome, CORIE not on CPAP, tremor, GERD, HTN, PAF-recent loop recorder readings without afib, anxiety, neuropathy with upcoming EMG study who presents for follow up  He was last seen in March  He was hospitalized in July for dizziness/abnormal "cap" sensation on his head as well as chest pain  His medications were changed- was put on coreg and reduced his diltiazem at that time  He states today "everything got worse when my medications were changed"  He has followed up with PCP since admission, MRI brain repeat is ordered, and he is now off the diltiazem  He states today the main symptom is anxiety and a "fuzzy" feeling in his head occasionally through the day  He does not sleep well  He does see psychology at his PCP office  He denies any new stroke like symptoms; he denies bleeding complaints; no change in his tremor although this does get worse with anxiety  He denies any joint pains  He is eating  He was recently prescribed remeron by family doctor but has not tried this yet      Recent labs:  k-3 4  inr 3 11  Normal cbc  Na 143  Bun 8  Creat 1 15  Vit d 34 6      Previous testing:  CT head 4/19/2022:  IMPRESSION:  Chronic microangiopathic findings and right occipital infarction unchanged since 5/1/2021  Mild acute on chronic sinusitis  MRI brain 5/1/2021:  IMPRESSION:  Acute and subacute infarcts are seen in the supra and infratentorial brain suggesting embolic phenomenon  There is advanced chronic microangiopathic changes as well as chronic infarcts in the left corona radiata  Home sleep study 10/27/2020: Moderate obstructive sleep apnea   The patient had a total of 156 respiratory events made up of 11   obstructive apneas, 0 central apneas/mixed apneas and 145 hypopneas   resulting in a respiratory event index (DARA) of 20 4      The lowest SpO2 recorded is 82% and 3% of the study was spent with   saturations less than 90%  The snore index was 13 8%  The following portions of the patient's history were reviewed and updated as appropriate: allergies, current medications, past family history, past medical history, past social history, past surgical history and problem list          Objective:    Blood pressure 166/95, pulse 63, temperature (!) 97 2 °F (36 2 °C), temperature source Temporal, resp  rate 16, height 5' 8" (1 727 m), weight 103 kg (226 lb)  Repeat 156/88 manual  Physical Exam  Vitals reviewed  Constitutional:       General: He is not in acute distress  Appearance: He is obese  HENT:      Head: Normocephalic  Right Ear: External ear normal       Left Ear: External ear normal       Nose: Nose normal       Mouth/Throat:      Mouth: Mucous membranes are moist       Pharynx: Oropharynx is clear  Eyes:      General: Lids are normal       Extraocular Movements: Extraocular movements intact  Pupils: Pupils are equal, round, and reactive to light  Cardiovascular:      Rate and Rhythm: Normal rate and regular rhythm  Pulses: Normal pulses  Pulmonary:      Effort: Pulmonary effort is normal       Breath sounds: Normal breath sounds  Abdominal:      General: There is no distension  Musculoskeletal:      Cervical back: Normal range of motion  Right lower leg: No edema  Left lower leg: No edema  Skin:     Capillary Refill: Capillary refill takes less than 2 seconds  Neurological:      Mental Status: He is alert  Mental status is at baseline  Motor: No weakness  Coordination: Romberg sign negative  Deep Tendon Reflexes: Strength normal       Reflex Scores:       Brachioradialis reflexes are 1+ on the right side and 1+ on the left side  Patellar reflexes are 1+ on the right side and 1+ on the left side  Psychiatric:         Mood and Affect: Affect is flat  Speech: Speech normal          Neurological Exam  Mental Status  Alert  Oriented to person, place and time  Speech is normal     Cranial Nerves  CN II: Visual acuity is normal  Visual fields full to confrontation  CN III, IV, VI: Extraocular movements intact bilaterally  Normal lids and orbits bilaterally  Pupils equal round and reactive to light bilaterally  CN V: Facial sensation is normal   CN VII: Full and symmetric facial movement  CN VIII: Hearing is normal   CN IX, X: Palate elevates symmetrically  Normal gag reflex  CN XI: Shoulder shrug strength is normal   CN XII: Tongue midline without atrophy or fasciculations  Motor   Strength is 5/5 throughout all four extremities  Intermittent/mild left hand action tremor  Sensory  Light touch is normal in upper and lower extremities  Reflexes                                            Right                      Left  Brachioradialis                    1+                         1+  Patellar                                1+                         1+    Coordination  Right: Finger-to-nose normal Left: Finger-to-nose normal     Gait  Casual gait is normal including stance, stride, and arm swing  Romberg is absent  Able to rise from chair without using arms  ROS:    Review of Systems   Constitutional: Negative  Negative for appetite change and fever  HENT: Negative    Negative for hearing loss, tinnitus, trouble swallowing and voice change  Eyes: Negative  Negative for photophobia and pain  Respiratory: Negative  Negative for shortness of breath  Cardiovascular: Negative  Negative for palpitations  Gastrointestinal: Negative  Negative for nausea and vomiting  Endocrine: Negative  Negative for cold intolerance  Genitourinary: Negative  Negative for dysuria, frequency and urgency  Musculoskeletal: Negative  Negative for myalgias and neck pain  Skin: Negative  Negative for rash  Neurological: Positive for dizziness, tremors (L hand), weakness (R sided) and light-headedness  Negative for seizures, syncope, facial asymmetry, speech difficulty, numbness and headaches  Hematological: Negative  Does not bruise/bleed easily  Psychiatric/Behavioral: Positive for sleep disturbance  Negative for confusion and hallucinations          Anxiety   ROS was reviewed and updated as appropriate

## 2022-08-19 NOTE — TELEPHONE ENCOUNTER
Spoke with pt he is aware of dr ponce recommendation regarding his medication  Pt agree and would call back if he has any other question

## 2022-08-19 NOTE — TELEPHONE ENCOUNTER
I discussed very specifically with the patient that he can take the lorazepam at bedtime as needed, but he should take the Remeron at bedtime as well  The Remeron is an antidepressant and antianxiety medication that can work to help with sleep as well  Because of that, he should take that at bedtime, and definitely take the lorazepam as needed

## 2022-08-19 NOTE — TELEPHONE ENCOUNTER
TH, Pt states he is confused and nervous about taking the Remeron, Pt is asking if he takes this at bedtime and it does not work is he suppose to take the Lorazepam, I told him he should only take the Lorazepam as needed

## 2022-08-23 ENCOUNTER — ANTICOAG VISIT (OUTPATIENT)
Dept: FAMILY MEDICINE CLINIC | Facility: CLINIC | Age: 62
End: 2022-08-23

## 2022-08-23 ENCOUNTER — APPOINTMENT (OUTPATIENT)
Dept: LAB | Facility: HOSPITAL | Age: 62
End: 2022-08-23
Payer: COMMERCIAL

## 2022-08-23 DIAGNOSIS — I63.9 CEREBROVASCULAR ACCIDENT (CVA), UNSPECIFIED MECHANISM (HCC): Primary | ICD-10-CM

## 2022-08-23 DIAGNOSIS — I10 PRIMARY HYPERTENSION: ICD-10-CM

## 2022-08-23 DIAGNOSIS — E87.6 HYPOKALEMIA: ICD-10-CM

## 2022-08-23 DIAGNOSIS — E55.9 VITAMIN D DEFICIENCY: ICD-10-CM

## 2022-08-23 DIAGNOSIS — I48.0 PAROXYSMAL ATRIAL FIBRILLATION (HCC): ICD-10-CM

## 2022-08-23 DIAGNOSIS — E78.2 MIXED HYPERLIPIDEMIA: Chronic | ICD-10-CM

## 2022-08-23 LAB
25(OH)D3 SERPL-MCNC: 34.6 NG/ML (ref 30–100)
ALBUMIN SERPL BCP-MCNC: 3.7 G/DL (ref 3.5–5)
ALP SERPL-CCNC: 125 U/L (ref 46–116)
ALT SERPL W P-5'-P-CCNC: 34 U/L (ref 12–78)
ANION GAP SERPL CALCULATED.3IONS-SCNC: 9 MMOL/L (ref 4–13)
AST SERPL W P-5'-P-CCNC: 22 U/L (ref 5–45)
BILIRUB SERPL-MCNC: 2.4 MG/DL (ref 0.2–1)
BUN SERPL-MCNC: 8 MG/DL (ref 5–25)
CALCIUM SERPL-MCNC: 9 MG/DL (ref 8.3–10.1)
CHLORIDE SERPL-SCNC: 101 MMOL/L (ref 96–108)
CHOLEST SERPL-MCNC: 89 MG/DL
CO2 SERPL-SCNC: 29 MMOL/L (ref 21–32)
CREAT SERPL-MCNC: 1.16 MG/DL (ref 0.6–1.3)
GFR SERPL CREATININE-BSD FRML MDRD: 67 ML/MIN/1.73SQ M
GLUCOSE SERPL-MCNC: 112 MG/DL (ref 65–140)
HDLC SERPL-MCNC: 35 MG/DL
LDLC SERPL CALC-MCNC: 38 MG/DL (ref 0–100)
NONHDLC SERPL-MCNC: 54 MG/DL
POTASSIUM SERPL-SCNC: 3.8 MMOL/L (ref 3.5–5.3)
PROT SERPL-MCNC: 7.1 G/DL (ref 6.4–8.4)
SODIUM SERPL-SCNC: 139 MMOL/L (ref 135–147)
TRIGL SERPL-MCNC: 78 MG/DL

## 2022-08-23 PROCEDURE — 80061 LIPID PANEL: CPT

## 2022-08-23 PROCEDURE — 82306 VITAMIN D 25 HYDROXY: CPT

## 2022-08-23 PROCEDURE — 80053 COMPREHEN METABOLIC PANEL: CPT

## 2022-08-23 RX ORDER — WARFARIN SODIUM 4 MG/1
TABLET ORAL
COMMUNITY
End: 2022-08-23 | Stop reason: SDUPTHER

## 2022-08-23 NOTE — TELEPHONE ENCOUNTER
Dr Facundo Gandhi, patient is asking for 4mg Warfarin to be filled and sent to [facility] on [address]  Please   Thanks

## 2022-08-24 RX ORDER — WARFARIN SODIUM 4 MG/1
4 TABLET ORAL
Qty: 30 TABLET | Refills: 3 | Status: SHIPPED | OUTPATIENT
Start: 2022-08-24 | End: 2022-09-20

## 2022-08-29 ENCOUNTER — HOSPITAL ENCOUNTER (OUTPATIENT)
Dept: MRI IMAGING | Facility: HOSPITAL | Age: 62
Discharge: HOME | End: 2022-08-29
Payer: COMMERCIAL

## 2022-08-29 DIAGNOSIS — H53.9 VISUAL DISTURBANCE: ICD-10-CM

## 2022-08-29 DIAGNOSIS — D68.61 ANTIPHOSPHOLIPID ANTIBODY WITH HYPERCOAGULABLE STATE (HCC): ICD-10-CM

## 2022-08-29 DIAGNOSIS — I63.9 CEREBROVASCULAR ACCIDENT (CVA), UNSPECIFIED MECHANISM (HCC): ICD-10-CM

## 2022-08-29 DIAGNOSIS — R29.898 WEAKNESS OF BOTH LOWER EXTREMITIES: ICD-10-CM

## 2022-08-29 DIAGNOSIS — R27.0 ATAXIA: ICD-10-CM

## 2022-08-29 PROCEDURE — G1004 CDSM NDSC: HCPCS

## 2022-08-29 PROCEDURE — 70553 MRI BRAIN STEM W/O & W/DYE: CPT

## 2022-08-29 PROCEDURE — A9585 GADOBUTROL INJECTION: HCPCS | Performed by: RADIOLOGY

## 2022-08-29 RX ADMIN — GADOBUTROL 10 ML: 604.72 INJECTION INTRAVENOUS at 11:25

## 2022-08-30 ENCOUNTER — APPOINTMENT (OUTPATIENT)
Dept: LAB | Facility: HOSPITAL | Age: 62
End: 2022-08-30
Payer: COMMERCIAL

## 2022-08-30 ENCOUNTER — OFFICE VISIT (OUTPATIENT)
Dept: FAMILY MEDICINE CLINIC | Facility: CLINIC | Age: 62
End: 2022-08-30
Payer: COMMERCIAL

## 2022-08-30 VITALS
WEIGHT: 222.2 LBS | TEMPERATURE: 97.8 F | HEIGHT: 68 IN | DIASTOLIC BLOOD PRESSURE: 88 MMHG | BODY MASS INDEX: 33.68 KG/M2 | HEART RATE: 74 BPM | SYSTOLIC BLOOD PRESSURE: 156 MMHG

## 2022-08-30 DIAGNOSIS — I10 PRIMARY HYPERTENSION: ICD-10-CM

## 2022-08-30 DIAGNOSIS — F41.1 GENERALIZED ANXIETY DISORDER: ICD-10-CM

## 2022-08-30 DIAGNOSIS — I48.0 PAROXYSMAL ATRIAL FIBRILLATION (HCC): ICD-10-CM

## 2022-08-30 DIAGNOSIS — F41.9 ANXIETY DISORDER: ICD-10-CM

## 2022-08-30 DIAGNOSIS — K21.9 GASTROESOPHAGEAL REFLUX DISEASE, UNSPECIFIED WHETHER ESOPHAGITIS PRESENT: Primary | ICD-10-CM

## 2022-08-30 DIAGNOSIS — K21.9 GASTROESOPHAGEAL REFLUX DISEASE WITHOUT ESOPHAGITIS: ICD-10-CM

## 2022-08-30 DIAGNOSIS — R42 DIZZINESS: ICD-10-CM

## 2022-08-30 PROCEDURE — 99214 OFFICE O/P EST MOD 30 MIN: CPT | Performed by: FAMILY MEDICINE

## 2022-08-30 PROCEDURE — 3077F SYST BP >= 140 MM HG: CPT | Performed by: FAMILY MEDICINE

## 2022-08-30 PROCEDURE — 3079F DIAST BP 80-89 MM HG: CPT | Performed by: FAMILY MEDICINE

## 2022-08-30 PROCEDURE — 1111F DSCHRG MED/CURRENT MED MERGE: CPT | Performed by: FAMILY MEDICINE

## 2022-08-30 RX ORDER — FAMOTIDINE 40 MG/1
40 TABLET, FILM COATED ORAL 2 TIMES DAILY
Qty: 90 TABLET | Refills: 1 | Status: SHIPPED | OUTPATIENT
Start: 2022-08-30

## 2022-08-30 RX ORDER — MIRTAZAPINE 15 MG/1
15 TABLET, FILM COATED ORAL
Qty: 30 TABLET | Refills: 2 | Status: SHIPPED | OUTPATIENT
Start: 2022-08-30 | End: 2022-09-26

## 2022-08-30 NOTE — ASSESSMENT & PLAN NOTE
Patient is having significant issues with anxiety and depression  He is not acting himself currently  He did bring a family member with him today, who confirmed that he is not acting himself  This point, would recommend strongly that he follow-up with Psychiatry, not just Psychology  Currently on mirtazapine 7 5 mg at , so I would recommend consider going to 15 mg  Maximum is 45 mg  Of note, the patient seems to have worsening tremors when his anxiety is worse  Recommend follow with Psychology and consider psychiatry

## 2022-08-30 NOTE — ASSESSMENT & PLAN NOTE
Patient does have increased symptoms of dizziness  Question if this is related to orthostasis, despite the fact that his blood pressure is high  Will discontinue amlodipine at this point  He is only on a small dose  Follow-up with Cardiology in the future

## 2022-08-30 NOTE — PROGRESS NOTES
Assessment and Plan:    Problem List Items Addressed This Visit     Anxiety disorder     Patient is having significant issues with anxiety and depression  He is not acting himself currently  He did bring a family member with him today, who confirmed that he is not acting himself  This point, would recommend strongly that he follow-up with Psychiatry, not just Psychology  Currently on mirtazapine 7 5 mg at HS, so I would recommend consider going to 15 mg  Maximum is 45 mg  Of note, the patient seems to have worsening tremors when his anxiety is worse  Recommend follow with Psychology and consider psychiatry  Relevant Medications    mirtazapine (REMERON) 15 mg tablet    Atrial fibrillation (HCC)     Heart rate is regular today  Follow with Cardiology in the future  Dizziness     Patient does have increased symptoms of dizziness  Question if this is related to orthostasis, despite the fact that his blood pressure is high  Will discontinue amlodipine at this point  He is only on a small dose  Follow-up with Cardiology in the future  GERD (gastroesophageal reflux disease) - Primary     Patient does have increased reflux symptoms  He is using pantoprazole b i d  Angelia Farrell Certainly, he can add Pepcid b i d  Did that  Was only using it once a day  Follow at next scheduled visit  Relevant Medications    famotidine (PEPCID) 40 MG tablet    Hypertension     Blood pressure here is slightly elevated, but with his symptoms, it sounds more like hypotension  At this point, I would recommend holding the amlodipine, which is only 2 5 mg daily  Consider follow-up with Cardiology in the near future                          Diagnoses and all orders for this visit:    Gastroesophageal reflux disease, unspecified whether esophagitis present    Primary hypertension    Generalized anxiety disorder    Paroxysmal atrial fibrillation (HCC)    Anxiety disorder  -     mirtazapine (REMERON) 15 mg tablet; Take 1 tablet (15 mg total) by mouth daily at bedtime    Dizziness    Gastroesophageal reflux disease without esophagitis  -     famotidine (PEPCID) 40 MG tablet; Take 1 tablet (40 mg total) by mouth 2 (two) times a day            Subjective:      Patient ID: Jose Robbins is a 58 y o  male  CC:    Chief Complaint   Patient presents with    Follow-up     2 week f/u to review recent results  HPI:    Here to follow up on multiple issues  MRI reviewed  Old CVA  Microvascular changes  Has a tingling in the top of the head  Not sure about why  Sleep: He is sleeping, but only for about 4 hours  He is up for a while, then finally sleeps again  Awakened again  In AM, still feels some effects from Lorazepam     Has increased anxiety during the day  Does not use Lorazepam, as that seems to increase sleepiness  Using Norvasc at night, but feels it adds to his changes in vision  Gets more wobbily, and vision blurry at times with this  Has increased GERD symptoms  Using Protonix BID, and Tums  Has been on Pepcid, but only AM     Has had some issues with limiting activity due to fatigue  Cardio recommended stress test before, but he had declined that before  The following portions of the patient's history were reviewed and updated as appropriate: allergies, current medications, past family history, past medical history, past social history, past surgical history and problem list       Review of Systems   Constitutional: Negative  HENT: Negative  Data to review:       Objective:    Vitals:    08/30/22 1357   BP: 156/88   BP Location: Left arm   Patient Position: Sitting   Cuff Size: Adult   Pulse: 74   Temp: 97 8 °F (36 6 °C)   TempSrc: Temporal   Weight: 101 kg (222 lb 3 2 oz)   Height: 5' 8" (1 727 m)        Physical Exam  Vitals and nursing note reviewed  Constitutional:       Appearance: Normal appearance  Neck:      Vascular: No carotid bruit  Cardiovascular:      Rate and Rhythm: Normal rate and regular rhythm  Pulses: Normal pulses  Carotid pulses are 2+ on the right side and 2+ on the left side  Heart sounds: Normal heart sounds  No murmur heard  No gallop  Pulmonary:      Effort: Pulmonary effort is normal  No respiratory distress  Breath sounds: Normal breath sounds  No stridor  No wheezing, rhonchi or rales  Chest:      Chest wall: No tenderness  Neurological:      Mental Status: He is alert

## 2022-08-30 NOTE — PATIENT INSTRUCTIONS
Problem List Items Addressed This Visit       Anxiety disorder     Patient is having significant issues with anxiety and depression  He is not acting himself currently  He did bring a family member with him today, who confirmed that he is not acting himself  This point, would recommend strongly that he follow-up with Psychiatry, not just Psychology  Currently on mirtazapine 7 5 mg at HS, so I would recommend consider going to 15 mg  Maximum is 45 mg  Of note, the patient seems to have worsening tremors when his anxiety is worse  Recommend follow with Psychology and consider psychiatry  Relevant Medications    mirtazapine (REMERON) 15 mg tablet    Atrial fibrillation (HCC)     Heart rate is regular today  Follow with Cardiology in the future  Dizziness     Patient does have increased symptoms of dizziness  Question if this is related to orthostasis, despite the fact that his blood pressure is high  Will discontinue amlodipine at this point  He is only on a small dose  Follow-up with Cardiology in the future  GERD (gastroesophageal reflux disease) - Primary     Patient does have increased reflux symptoms  He is using pantoprazole b i d  Lee Manifold Certainly, he can add Pepcid b i d  Did that  Was only using it once a day  Follow at next scheduled visit  Relevant Medications    famotidine (PEPCID) 40 MG tablet    Hypertension     Blood pressure here is slightly elevated, but with his symptoms, it sounds more like hypotension  At this point, I would recommend holding the amlodipine, which is only 2 5 mg daily  Consider follow-up with Cardiology in the near future  COVID 19 Instructions    Hans Miranda was advised to limit contact with others to essential tasks such as getting food, medications, and medical care  Proper handwashing reviewed, and Hand sanitzer when washing is not available      If the patient develops symptoms of COVID 19, the patient should call the office as soon as possible  For 8030-5027 Flu season, it is strongly recommended that Flu Vaccinations be obtained  Virtual Visits:  Cr: This works on smart phones (any phone with Internet browsing capability)  You should get a text message when the provider is ready to see you  Click on the link in the text message, and the call should start  You will need to type in your name, and allow camera and microphone access  This is HIPPA compliant, and secure  If you have not already done so, get immunized to COVID 19  We are committed to getting you vaccinated as soon as possible and will be closely following CDC and [facility]  guidelines as they are released and revised  Please refer to our COVID-19 vaccine webpage for the most up to date information on the vaccine and our distribution efforts  This site will also have the most up to date recommendations for COVID booster vaccine  Jose gallegos    Call [phone]), option [phone]    OUR NEW LOCATION:    [address], [address] [place], [place] [address]way [address] [place], [place], [address]  Fax: [phone]    Lab services and OB/GYN are at this location as well

## 2022-08-30 NOTE — ASSESSMENT & PLAN NOTE
Blood pressure here is slightly elevated, but with his symptoms, it sounds more like hypotension  At this point, I would recommend holding the amlodipine, which is only 2 5 mg daily  Consider follow-up with Cardiology in the near future

## 2022-08-30 NOTE — ASSESSMENT & PLAN NOTE
Patient does have increased reflux symptoms  He is using pantoprazole b i d  [place] [employer] Certainly, he can add Pepcid b i d  Did that  Was only using it once a day  Follow at next scheduled visit

## 2022-08-31 ENCOUNTER — ANTICOAG VISIT (OUTPATIENT)
Dept: FAMILY MEDICINE CLINIC | Facility: CLINIC | Age: 62
End: 2022-08-31

## 2022-08-31 ENCOUNTER — TELEPHONE (OUTPATIENT)
Dept: FAMILY MEDICINE CLINIC | Facility: CLINIC | Age: 62
End: 2022-08-31

## 2022-08-31 RX ORDER — LORAZEPAM 0.5 MG/1
0.5 TABLET ORAL EVERY 8 HOURS PRN
Qty: 30 TABLET | Refills: 0 | Status: SHIPPED | OUTPATIENT
Start: 2022-08-31 | End: 2022-09-12 | Stop reason: SDUPTHER

## 2022-08-31 NOTE — TELEPHONE ENCOUNTER
Linh GARAY Key: [id number] - PA Case ID: [id number] - Rx #: [id number]  Need help? Call us at [phone]    Status Sent to Plan today    Drug LORazepam 0 5MG tablets   Form [employer] Electronic PA Form ([address])

## 2022-09-02 ENCOUNTER — ANTICOAG VISIT (OUTPATIENT)
Dept: FAMILY MEDICINE CLINIC | Facility: CLINIC | Age: 62
End: 2022-09-02

## 2022-09-02 ENCOUNTER — APPOINTMENT (OUTPATIENT)
Dept: LAB | Facility: HOSPITAL | Age: 62
End: 2022-09-02
Payer: COMMERCIAL

## 2022-09-02 ENCOUNTER — TELEPHONE (OUTPATIENT)
Dept: FAMILY MEDICINE CLINIC | Facility: CLINIC | Age: 62
End: 2022-09-02

## 2022-09-02 DIAGNOSIS — F41.9 ANXIETY: ICD-10-CM

## 2022-09-02 DIAGNOSIS — R25.1 TREMOR: ICD-10-CM

## 2022-09-02 LAB
T4 FREE SERPL-MCNC: 1.48 NG/DL (ref 0.76–1.46)
TSH SERPL DL<=0.05 MIU/L-ACNC: 0.19 UIU/ML (ref 0.45–4.5)

## 2022-09-02 PROCEDURE — 36415 COLL VENOUS BLD VENIPUNCTURE: CPT

## 2022-09-02 PROCEDURE — 84443 ASSAY THYROID STIM HORMONE: CPT

## 2022-09-02 PROCEDURE — 84439 ASSAY OF FREE THYROXINE: CPT

## 2022-09-02 NOTE — TELEPHONE ENCOUNTER
Patient called in to make Dr Анна Rodney aware that he started the lorazepam on 8/31/22 and he went to bed around 10pm and woke up around 3am and could get back to sleep, patient stated he felt like crying  Please give patient a call back with recommendations thank you!

## 2022-09-06 ENCOUNTER — TELEPHONE (OUTPATIENT)
Dept: FAMILY MEDICINE CLINIC | Facility: CLINIC | Age: 62
End: 2022-09-06

## 2022-09-06 NOTE — TELEPHONE ENCOUNTER
Potassium should not cause hot flashes  Question this is panic related  Lipid panel is usually fasting  That is on the blood work orders in most cases  If he did not fast, we can review at some point

## 2022-09-06 NOTE — TELEPHONE ENCOUNTER
Pt states he gets hot flashes 3 to 4 times a day and he is wondering if it is due to his potassium medication? He last saw Dr Nilson Pereira on 8/30  Pt also states that he didn't know he was supposed to fast for his lipid panel that he had done back in August  He wanted to make Dr Nilson Pereira aware  Please call pt at [phone] and [facility] if he cannot answer, thank you

## 2022-09-06 NOTE — TELEPHONE ENCOUNTER
Patient aware of Dr Melissa Shaffer response re: K+, hot flashes, Lipid results  Patient will keep his appt   On 9/28

## 2022-09-06 NOTE — TELEPHONE ENCOUNTER
Patient aware of your recommendations  He also c/o sweating that lasts for about 10 minutes, usually starts 1/2-1 hour after taking his K+  He states it was doubled recently and didn't know if there was a correlation between the increase and his sweating symptoms

## 2022-09-07 ENCOUNTER — TELEPHONE (OUTPATIENT)
Dept: FAMILY MEDICINE CLINIC | Facility: CLINIC | Age: 62
End: 2022-09-07

## 2022-09-07 DIAGNOSIS — E87.6 HYPOKALEMIA: ICD-10-CM

## 2022-09-07 RX ORDER — POTASSIUM CHLORIDE 1500 MG/1
TABLET, EXTENDED RELEASE ORAL
Qty: 90 TABLET | Refills: 1 | Status: SHIPPED | OUTPATIENT
Start: 2022-09-07 | End: 2022-10-26

## 2022-09-07 NOTE — TELEPHONE ENCOUNTER
Dr Shirlnee Chirinos is calling to make Dr Georgia Byers aware that she saw pt recently and would like to discuss pt's recent thyroid lab results with Dr Georgia Byers  She states that the results came back abnormal, which explains his recent symptoms  She states that she did have him copied on the results so we would get them as well  She can be reached at [phone] and you can lmom if she cannot answer

## 2022-09-08 NOTE — TELEPHONE ENCOUNTER
Needs OV to review  This was ordered by [id number] [facility]  The TSH just before this was normal   Should repeat, and consider next options then

## 2022-09-12 ENCOUNTER — APPOINTMENT (OUTPATIENT)
Dept: LAB | Facility: HOSPITAL | Age: 62
End: 2022-09-12
Payer: COMMERCIAL

## 2022-09-12 DIAGNOSIS — F41.9 ANXIETY DISORDER: ICD-10-CM

## 2022-09-12 LAB — INR PPP: 2.2 (ref 0.84–1.19)

## 2022-09-12 NOTE — TELEPHONE ENCOUNTER
Patient is asking for a refill of his Lorazepam  He is taking 1 every 8 hours, so his 30 pills are only lasting him 15 days  He said he sees Fidel Reddy ( in our office) on the 20th but he'll run out before then

## 2022-09-13 RX ORDER — LORAZEPAM 0.5 MG/1
0.5 TABLET ORAL EVERY 8 HOURS PRN
Qty: 60 TABLET | Refills: 0 | Status: SHIPPED | OUTPATIENT
Start: 2022-09-13 | End: 2022-09-28 | Stop reason: SDUPTHER

## 2022-09-14 ENCOUNTER — OFFICE VISIT (OUTPATIENT)
Dept: CARDIOLOGY CLINIC | Facility: CLINIC | Age: 62
End: 2022-09-14
Payer: COMMERCIAL

## 2022-09-14 VITALS
OXYGEN SATURATION: 99 % | WEIGHT: 216.4 LBS | DIASTOLIC BLOOD PRESSURE: 98 MMHG | SYSTOLIC BLOOD PRESSURE: 170 MMHG | BODY MASS INDEX: 32.8 KG/M2 | HEIGHT: 68 IN | HEART RATE: 72 BPM

## 2022-09-14 DIAGNOSIS — R07.9 CHEST PAIN: ICD-10-CM

## 2022-09-14 DIAGNOSIS — I10 PRIMARY HYPERTENSION: ICD-10-CM

## 2022-09-14 DIAGNOSIS — I48.0 PAROXYSMAL ATRIAL FIBRILLATION (HCC): ICD-10-CM

## 2022-09-14 DIAGNOSIS — R42 DIZZINESS: ICD-10-CM

## 2022-09-14 PROCEDURE — 99214 OFFICE O/P EST MOD 30 MIN: CPT | Performed by: INTERNAL MEDICINE

## 2022-09-14 NOTE — PROGRESS NOTES
Cardiology             [facility]  1960  [id number]                      Assessment/Plan:     Benign essential hypertension  Bicuspid aortic valve with mild aortic valve stenosis  History of significant ascending aortic aneurysm status post replacement with matthew arch construction in 2014 (bicuspid valve not replaced at that time)  Paroxysmal atrial fibrillation--noted postOP 2014 without objective recurrence  Prior lacunar infarcts on MRI brain 1/2020 with recurrent stroke 03/2020 while on Xarelto, subsequently switched to Eliquis/aspirin, with subsequent CVA on MRI noted 5/2021--switched to warfarin then  Hypercoagulable state with antiphospholipid antibody syndrome, positive lupus anticoagulant antibodies  Chronic atypical chest pain        Prior loop recorder interrogation was sinus rhythm and PACs  Continue interrogations as scheduled  Continue warfarin anticoagulation as followed by PCP  Will need Lovenox bridging if any interruption for procedures is necessary in light of history of stroke  Blood pressure elevated secondary to uncontrolled panic disorder  He is scheduled to see Psychiatry next week  Continue atorvastatin for dyslipidemia and cardiovascular risk reduction    Patient's PCP following up on mildly abnormal TSH and free T4           Follow-up in 6 months           Interval History:      This is a very 63 YO male with a significant history of ascending aortic aneurysm, status post open replacement of the ascending aorta with matthew arch reconstruction on 11/06/2014   Preoperative cardiac catheterization was unremarkable   Postoperatively, he had transient atrial fibrillation, which converted to sinus rhythm on amiodarone/metoprolol, without any recurrence   Therefore he has not been maintained on anticoagulation while seeing Dr Mar Lopes in the past  Lashonda Mcintosh also has a history of bicuspid aortic valve without significant stenosis, hypertension      He was last hospitalized August 2017 for numbness on his face, left arm, and left leg, with MRI brain which was unremarkable for stroke, without any evidence of atrial fibrillation on telemetry   MRI cervical spine 09/2017 was unremarkable   Echocardiogram on 8/27/17 revealed a normal ejection fraction at 60% with no evidence of aortic stenosis and no regurgitation   The valve leaflets were not well visualized      Prior Holter monitor October 2017 revealed no evidence of atrial fibrillation, only occasional PACs and PVCs      During his prior visit 01/2019 he had complaints of atypical chest pain   Thereafter he underwent a nuclear stress test 02/2019 which was unremarkable   Echocardiogram revealed mild aortic stenosis      His losartan was discontinued 09/2019 secondary to angioedema   During his appointment with his PCP 12/26/2019, MRI of his head was recommended secondary to ongoing left arm weakness   MRI brain 01/02/2020 reported several subacute lacunar infarctions with microvascular ischemic disease which was stable   Subsequently, he was started on Xarelto by his PCP in light of his prior history of paroxysmal atrial fibrillation which was postoperative  Thompson Tian subsequently was hospitalized 03/2020 with recurrent stroke with CTA and MRI of the brain confirming a new CVA   He underwent loop recorder placement and was changed from Xarelto to Eliquis   Aspirin was added      He has been following with Hematology and has been diagnosed with antiphospholipid syndrome, with blood work 03/2020 confirming presence of lupus anticoagulant antibodies         He was hospitalized 05/01/2021 due to findings of CVA on outpatient MRI that was obtained visual field deficits   He underwent a NGUYỄN which was unremarkable, and was transition from Eliquis to warfarin and aspirin at discharge  [facility] care was given to his PCP        He was evaluated by Gastroenterology 06/22/2021 for intermittent dark stools and self limited hematemesis    Subsequent EGD 08/25/2021 revealed evidence of erythematous mucosa with erosion in the stomach with normal esophagus and single small angiectasia without bleeding      He underwent successful hernia surgery 12/09/2021 with successful Lovenox bridging  He went to the ER 08/11/2022 with bilateral arm numbness and numbness of his head  Blood pressure was high at that time  He was scheduled for an outpatient MRI which she requested to be done sooner  However, he was unwilling to wait until the night time to get his MRI done  He was requested follow-up with his PCP for anxiety-related symptoms  CTA chest 08/08/2022 had revealed no evidence of pulmonary embolism  Subsequent MRI head 08/29/2022 demonstrated microangiopathic and remote post ischemic changes without acute intracranial abnormalities  He presents today for follow-up  He complains of significant symptoms of anxiety and panic attacks  He is scheduled to see Psychiatry next week  He gets tremors, feels very anxious and panicky, and starts mumbling              Vitals:  Vitals:    09/14/22 1035   BP: 170/98   Pulse: 72   SpO2: 99%   Weight: 98 2 kg (216 lb 6 4 oz)   Height: 5' 8" (1 727 m)         Past Medical History:   Diagnosis Date    Aneurysm of thoracic aorta ([address] )     last assessed 11/20/17    Anxiety     currently on no meds    Arthritis     Bilateral inguinal hernia     Cardiac disease     Depression     GERD (gastroesophageal reflux disease)     Hearing loss of aging     Hyperlipidemia     Hypertension     Irritable bowel syndrome     Kidney stone     Obesity     Occasional tremors     left arm since stroke    Palpitations     PONV (postoperative nausea and vomiting)     and headache x 3 days    Sciatica     right and left  side    Shortness of breath     on exertion    Sleep apnea     is not using a CPAP    Spitting up blood 05/21/2021    Resolved    Status post placement of implantable loop recorder     Stroke ([address] ) 11/2014    Stroke [address] [address] ) 03/2021    TIA (transient ischemic attack)      Social History     Socioeconomic History    Marital status:      Spouse name: Not on file    Number of children: Not on file    Years of education: Not on file    Highest education level: Not on file   Occupational History    Occupation: disabled     Tobacco Use    Smoking status: Never Smoker    Smokeless tobacco: Never Used    Tobacco comment: no second hand smoke exposure   Vaping Use    Vaping Use: Never used   Substance and Sexual Activity    Alcohol use: Not Currently    Drug use: No    Sexual activity: Not Currently   Other Topics Concern    Not on file   Social History Narrative    Caffeine use    Completed some college     as per [facility]     Social Determinants of Health     Financial Resource Strain: Not on file   Food Insecurity: Not on file   Transportation Needs: Not on file   Physical Activity: Not on file   Stress: Not on file   Social Connections: Not on file   Intimate Partner Violence: Not on file   Housing Stability: Not on file      Family History   Problem Relation Age of Onset    Diverticulitis Mother         of colon    Nephrolithiasis Mother     Emphysema Father     Nephrolithiasis Sister     Heart attack Maternal Grandfather 72    Breast cancer Paternal Grandmother     Lung cancer Paternal Grandfather     Cancer Family     Coronary artery disease Family     Diabetes Family         sibling    Hypertension Family         sibling    Hernia Family         sibling     Past Surgical History:   Procedure Laterality Date    AORTA SURGERY      thoracic - aneurysmorrhaphy    APPENDECTOMY      ASCENDING AORTIC ANEURYSM REPAIR      resolved 8/19/15    CARDIAC LOOP RECORDER      CHOLECYSTECTOMY      laparoscopic    COLONOSCOPY      CYSTOSCOPY      with removal of object- stent removal    KNEE ARTHROSCOPY Right 1996    with medial meniscus repair    LITHOTRIPSY      renal    ORTHOPEDIC SURGERY      DC FRAGMENT KIDNEY STONE/ ESWL Left 5/17/2018    Procedure: LITHROTRIPSY EXTRACORPORAL SHOCKWAVE (ESWL);   Surgeon: Nita Hussein MD;  [facility]: [facility];  Service: Urology    DC [address] Bilateral 12/9/2021    Procedure: ROBOTIC REPAIR HERNIA INGUINAL;  Surgeon: James Marks MD;  Location: [facility];  Service: General    THUMB ARTHROSCOPY Right 1976    ligament repair    UPPER GASTROINTESTINAL ENDOSCOPY         Current Outpatient Medications:     acetaminophen (TYLENOL) 500 mg tablet, Take 500 mg by mouth every 6 (six) hours as needed for mild pain 2 tab, Disp: , Rfl:     amoxicillin (AMOXIL) 500 mg capsule, , Disp: , Rfl:     atorvastatin (LIPITOR) 40 mg tablet, Take 1 tablet (40 mg total) by mouth daily with dinner, Disp: 90 tablet, Rfl: 1    carvedilol (COREG) 12 5 mg tablet, Take 1 tablet (12 5 mg total) by mouth 2 (two) times a day with meals, Disp: 60 tablet, Rfl: 0    famotidine (PEPCID) 40 MG tablet, Take 1 tablet (40 mg total) by mouth 2 (two) times a day, Disp: 90 tablet, Rfl: 1    furosemide (LASIX) 20 mg tablet, TAKE 1 TABLET BY MOUTH EVERY DAY, Disp: 30 tablet, Rfl: 2    Klor-Con M20 20 MEQ tablet, TAKE 1 TABLET BY MOUTH EVERY DAY, Disp: 90 tablet, Rfl: 1    LORazepam (ATIVAN) 0 5 mg tablet, Take 1 tablet (0 5 mg total) by mouth every 8 (eight) hours as needed for anxiety, Disp: 60 tablet, Rfl: 0    mirtazapine (REMERON) 15 mg tablet, Take 1 tablet (15 mg total) by mouth daily at bedtime, Disp: 30 tablet, Rfl: 2    pantoprazole (PROTONIX) 40 mg tablet, TAKE 1 TABLET BY MOUTH EVERY DAY (Patient taking differently: 2 (two) times a day), Disp: 90 tablet, Rfl: 1    warfarin (COUMADIN) 3 mg tablet, Do not fill continue to take as prior plan, Disp: , Rfl: 0    warfarin (COUMADIN) 4 mg tablet, Take 1 tablet (4 mg total) by mouth daily, Disp: 30 tablet, Rfl: 3    warfarin (COUMADIN) 5 mg tablet, CONTINUE TO TAKE AS PRIOR DO NOT FILL, Disp: 90 tablet, Rfl: 1        Review of Systems:  Review of Systems   Respiratory: Negative  Cardiovascular: Positive for chest pain  Psychiatric/Behavioral: Positive for behavioral problems and decreased concentration  The patient is nervous/anxious  All other systems reviewed and are negative  Physical Exam:  Physical Exam  Constitutional:       General: He is not in acute distress  Appearance: He is well-developed  He is not diaphoretic  HENT:      Head: Normocephalic and atraumatic  Eyes:      General: No scleral icterus  Right eye: No discharge  Pupils: Pupils are equal, round, and reactive to light  Neck:      Thyroid: No thyromegaly  Cardiovascular:      Rate and Rhythm: Normal rate and regular rhythm  Heart sounds: Normal heart sounds  No murmur heard  No friction rub  No gallop  Pulmonary:      Effort: Pulmonary effort is normal       Breath sounds: Normal breath sounds  Abdominal:      General: There is no distension  Tenderness: There is no abdominal tenderness  There is no guarding or rebound  Musculoskeletal:         General: Normal range of motion  Cervical back: Normal range of motion and neck supple  Skin:     General: Skin is warm and dry  Coloration: Skin is not pale  Findings: No erythema or rash  Neurological:      Mental Status: He is alert and oriented to person, place, and time  Coordination: Coordination normal    Psychiatric:         Behavior: Behavior normal          Thought Content: Thought content normal          Judgment: Judgment normal          This note was completed in part utilizing [employer] Software  Grammatical errors, random word insertions, spelling mistakes, and incomplete sentences can be an occasional consequence of this system secondary to software limitations, ambient noise, and hardware issues    If you have any questions or concerns about the content, text, or information contained within the body of this dictation, please contact the provider for clarification

## 2022-09-16 RX ORDER — CARVEDILOL 12.5 MG/1
TABLET ORAL
Qty: 60 TABLET | Refills: 5 | Status: ON HOLD | OUTPATIENT
Start: 2022-09-16 | End: 2022-10-26 | Stop reason: SDUPTHER

## 2022-09-20 ENCOUNTER — TELEPHONE (OUTPATIENT)
Dept: FAMILY MEDICINE CLINIC | Facility: CLINIC | Age: 62
End: 2022-09-20

## 2022-09-20 DIAGNOSIS — F41.0 PANIC ATTACK: Primary | ICD-10-CM

## 2022-09-20 NOTE — ASSESSMENT & PLAN NOTE
Contacted by the patient's psychologist     Patient was questioning whether not he could take melatonin  He is feeling extremely anxious overall  Would like to see Psychiatry  Previously, I had talked to him about that and agreed, but for the fact of scheduling lately, we agreed initially to start with Psychology  For now, if he wishes to take melatonin at bedtime, he can certainly do that, 5 mg at bedtime  This helps him to get ready to sleep for the evening  I would certainly not stop the mirtazapine, despite him stating that he feels it is not helping as much  Also, he could take 1 of his lorazepam at that time  Will follow-up in the future as his next scheduled appointment

## 2022-09-20 NOTE — TELEPHONE ENCOUNTER
Problem List Items Addressed This Visit     Panic attack - Primary     Contacted by the patient's psychologist     Patient was questioning whether not he could take melatonin  He is feeling extremely anxious overall  Would like to see Psychiatry  Previously, I had talked to him about that and agreed, but for the fact of scheduling lately, we agreed initially to start with Psychology  For now, if he wishes to take melatonin at bedtime, he can certainly do that, 5 mg at bedtime  This helps him to get ready to sleep for the evening  I would certainly not stop the mirtazapine, despite him stating that he feels it is not helping as much  Also, he could take 1 of his lorazepam at that time  Will follow-up in the future as his next scheduled appointment

## 2022-09-21 ENCOUNTER — HOSPITAL ENCOUNTER (EMERGENCY)
Facility: HOSPITAL | Age: 62
Discharge: HOME | End: 2022-09-22
Attending: EMERGENCY MEDICINE
Payer: COMMERCIAL

## 2022-09-21 ENCOUNTER — TELEPHONE (OUTPATIENT)
Dept: PSYCHIATRY | Facility: CLINIC | Age: 62
End: 2022-09-21

## 2022-09-21 ENCOUNTER — APPOINTMENT (OUTPATIENT)
Dept: RADIOLOGY | Facility: HOSPITAL | Age: 62
End: 2022-09-21
Payer: COMMERCIAL

## 2022-09-21 ENCOUNTER — OFFICE VISIT (OUTPATIENT)
Dept: FAMILY MEDICINE CLINIC | Facility: CLINIC | Age: 62
End: 2022-09-21
Payer: COMMERCIAL

## 2022-09-21 ENCOUNTER — REMOTE DEVICE CLINIC VISIT (OUTPATIENT)
Dept: CARDIOLOGY CLINIC | Facility: CLINIC | Age: 62
End: 2022-09-21
Payer: COMMERCIAL

## 2022-09-21 VITALS
SYSTOLIC BLOOD PRESSURE: 138 MMHG | DIASTOLIC BLOOD PRESSURE: 82 MMHG | HEIGHT: 68 IN | WEIGHT: 216.6 LBS | BODY MASS INDEX: 32.83 KG/M2 | HEART RATE: 76 BPM | OXYGEN SATURATION: 96 %

## 2022-09-21 DIAGNOSIS — F41.9 ANXIETY DISORDER: ICD-10-CM

## 2022-09-21 DIAGNOSIS — F41.0 PANIC ATTACK: ICD-10-CM

## 2022-09-21 DIAGNOSIS — R03.0 ELEVATED BLOOD PRESSURE READING: ICD-10-CM

## 2022-09-21 DIAGNOSIS — F41.0 ANXIETY ATTACK: Primary | ICD-10-CM

## 2022-09-21 DIAGNOSIS — F41.1 GENERALIZED ANXIETY DISORDER: Primary | ICD-10-CM

## 2022-09-21 DIAGNOSIS — R61 DIAPHORESIS: ICD-10-CM

## 2022-09-21 DIAGNOSIS — Z95.818 PRESENCE OF OTHER CARDIAC IMPLANTS AND GRAFTS: Primary | ICD-10-CM

## 2022-09-21 LAB
2HR DELTA HS TROPONIN: 0 NG/L
ALBUMIN SERPL BCP-MCNC: 3.6 G/DL (ref 3.5–5)
ALP SERPL-CCNC: 100 U/L (ref 46–116)
ALT SERPL W P-5'-P-CCNC: 31 U/L (ref 12–78)
AMPHETAMINES SERPL QL SCN: NEGATIVE
ANION GAP SERPL CALCULATED.3IONS-SCNC: 7 MMOL/L (ref 4–13)
AST SERPL W P-5'-P-CCNC: 22 U/L (ref 5–45)
BARBITURATES UR QL: NEGATIVE
BASOPHILS # BLD AUTO: 0.02 THOUSANDS/ΜL (ref 0–0.1)
BASOPHILS NFR BLD AUTO: 0 % (ref 0–1)
BENZODIAZ UR QL: NEGATIVE
BILIRUB SERPL-MCNC: 2.84 MG/DL (ref 0.2–1)
BILIRUB UR QL STRIP: NEGATIVE
BUN SERPL-MCNC: 6 MG/DL (ref 5–25)
CALCIUM SERPL-MCNC: 9.1 MG/DL (ref 8.3–10.1)
CARDIAC TROPONIN I PNL SERPL HS: 12 NG/L
CARDIAC TROPONIN I PNL SERPL HS: 12 NG/L
CHLORIDE SERPL-SCNC: 101 MMOL/L (ref 96–108)
CLARITY UR: CLEAR
CO2 SERPL-SCNC: 27 MMOL/L (ref 21–32)
COCAINE UR QL: NEGATIVE
COLOR UR: ABNORMAL
CREAT SERPL-MCNC: 1.03 MG/DL (ref 0.6–1.3)
EOSINOPHIL # BLD AUTO: 0.07 THOUSAND/ΜL (ref 0–0.61)
EOSINOPHIL NFR BLD AUTO: 1 % (ref 0–6)
ERYTHROCYTE [DISTWIDTH] IN BLOOD BY AUTOMATED COUNT: 14 % (ref 11.6–15.1)
ETHANOL EXG-MCNC: 0 MG/DL
FLUAV RNA RESP QL NAA+PROBE: NEGATIVE
FLUBV RNA RESP QL NAA+PROBE: NEGATIVE
GFR SERPL CREATININE-BSD FRML MDRD: 77 ML/MIN/1.73SQ M
GLUCOSE SERPL-MCNC: 100 MG/DL (ref 65–140)
GLUCOSE UR STRIP-MCNC: NEGATIVE MG/DL
HCT VFR BLD AUTO: 41.3 % (ref 36.5–49.3)
HGB BLD-MCNC: 14.3 G/DL (ref 12–17)
HGB UR QL STRIP.AUTO: NEGATIVE
IMM GRANULOCYTES # BLD AUTO: 0.01 THOUSAND/UL (ref 0–0.2)
IMM GRANULOCYTES NFR BLD AUTO: 0 % (ref 0–2)
INR PPP: 3.12 (ref 0.84–1.19)
KETONES UR STRIP-MCNC: ABNORMAL MG/DL
LEUKOCYTE ESTERASE UR QL STRIP: NEGATIVE
LYMPHOCYTES # BLD AUTO: 1.07 THOUSANDS/ΜL (ref 0.6–4.47)
LYMPHOCYTES NFR BLD AUTO: 16 % (ref 14–44)
MCH RBC QN AUTO: 30.5 PG (ref 26.8–34.3)
MCHC RBC AUTO-ENTMCNC: 34.6 G/DL (ref 31.4–37.4)
MCV RBC AUTO: 88 FL (ref 82–98)
METHADONE UR QL: NEGATIVE
MONOCYTES # BLD AUTO: 0.61 THOUSAND/ΜL (ref 0.17–1.22)
MONOCYTES NFR BLD AUTO: 9 % (ref 4–12)
NEUTROPHILS # BLD AUTO: 4.83 THOUSANDS/ΜL (ref 1.85–7.62)
NEUTS SEG NFR BLD AUTO: 74 % (ref 43–75)
NITRITE UR QL STRIP: NEGATIVE
NRBC BLD AUTO-RTO: 0 /100 WBCS
OPIATES UR QL SCN: NEGATIVE
OXYCODONE+OXYMORPHONE UR QL SCN: NEGATIVE
PCP UR QL: NEGATIVE
PH UR STRIP.AUTO: 5.5 [PH]
PLATELET # BLD AUTO: 166 THOUSANDS/UL (ref 149–390)
PMV BLD AUTO: 10.5 FL (ref 8.9–12.7)
POTASSIUM SERPL-SCNC: 3.4 MMOL/L (ref 3.5–5.3)
PROT SERPL-MCNC: 6.7 G/DL (ref 6.4–8.4)
PROT UR STRIP-MCNC: NEGATIVE MG/DL
PROTHROMBIN TIME: 32.3 SECONDS (ref 11.6–14.5)
RBC # BLD AUTO: 4.69 MILLION/UL (ref 3.88–5.62)
RSV RNA RESP QL NAA+PROBE: NEGATIVE
SARS-COV-2 RNA RESP QL NAA+PROBE: NEGATIVE
SODIUM SERPL-SCNC: 135 MMOL/L (ref 135–147)
SP GR UR STRIP.AUTO: 1.02 (ref 1–1.03)
THC UR QL: NEGATIVE
UROBILINOGEN UR STRIP-ACNC: <2 MG/DL
WBC # BLD AUTO: 6.61 THOUSAND/UL (ref 4.31–10.16)

## 2022-09-21 PROCEDURE — 80053 COMPREHEN METABOLIC PANEL: CPT | Performed by: EMERGENCY MEDICINE

## 2022-09-21 PROCEDURE — 99284 EMERGENCY DEPT VISIT MOD MDM: CPT | Performed by: EMERGENCY MEDICINE

## 2022-09-21 PROCEDURE — 99215 OFFICE O/P EST HI 40 MIN: CPT | Performed by: FAMILY MEDICINE

## 2022-09-21 PROCEDURE — 80307 DRUG TEST PRSMV CHEM ANLYZR: CPT | Performed by: EMERGENCY MEDICINE

## 2022-09-21 PROCEDURE — 81003 URINALYSIS AUTO W/O SCOPE: CPT | Performed by: EMERGENCY MEDICINE

## 2022-09-21 PROCEDURE — 82075 ASSAY OF BREATH ETHANOL: CPT | Performed by: EMERGENCY MEDICINE

## 2022-09-21 PROCEDURE — 85025 COMPLETE CBC W/AUTO DIFF WBC: CPT | Performed by: EMERGENCY MEDICINE

## 2022-09-21 PROCEDURE — G2066 INTER DEVC REMOTE 30D: HCPCS | Performed by: INTERNAL MEDICINE

## 2022-09-21 PROCEDURE — 3075F SYST BP GE 130 - 139MM HG: CPT | Performed by: FAMILY MEDICINE

## 2022-09-21 PROCEDURE — 84484 ASSAY OF TROPONIN QUANT: CPT | Performed by: EMERGENCY MEDICINE

## 2022-09-21 PROCEDURE — 36415 COLL VENOUS BLD VENIPUNCTURE: CPT | Performed by: EMERGENCY MEDICINE

## 2022-09-21 PROCEDURE — 93298 REM INTERROG DEV EVAL SCRMS: CPT | Performed by: INTERNAL MEDICINE

## 2022-09-21 PROCEDURE — 99285 EMERGENCY DEPT VISIT HI MDM: CPT

## 2022-09-21 PROCEDURE — 0241U HB NFCT DS VIR RESP RNA 4 TRGT: CPT | Performed by: EMERGENCY MEDICINE

## 2022-09-21 PROCEDURE — 71046 X-RAY EXAM CHEST 2 VIEWS: CPT

## 2022-09-21 PROCEDURE — 93005 ELECTROCARDIOGRAM TRACING: CPT

## 2022-09-21 PROCEDURE — 3079F DIAST BP 80-89 MM HG: CPT | Performed by: FAMILY MEDICINE

## 2022-09-21 PROCEDURE — 85610 PROTHROMBIN TIME: CPT | Performed by: EMERGENCY MEDICINE

## 2022-09-21 PROCEDURE — 96374 THER/PROPH/DIAG INJ IV PUSH: CPT

## 2022-09-21 RX ORDER — WARFARIN SODIUM 2 MG/1
4 TABLET ORAL
Status: DISCONTINUED | OUTPATIENT
Start: 2022-09-21 | End: 2022-09-21

## 2022-09-21 RX ORDER — FAMOTIDINE 20 MG/1
40 TABLET, FILM COATED ORAL ONCE
Status: COMPLETED | OUTPATIENT
Start: 2022-09-21 | End: 2022-09-21

## 2022-09-21 RX ORDER — LORAZEPAM 2 MG/ML
0.5 INJECTION INTRAMUSCULAR ONCE
Status: COMPLETED | OUTPATIENT
Start: 2022-09-21 | End: 2022-09-21

## 2022-09-21 RX ORDER — CARVEDILOL 12.5 MG/1
12.5 TABLET ORAL ONCE
Status: COMPLETED | OUTPATIENT
Start: 2022-09-21 | End: 2022-09-21

## 2022-09-21 RX ADMIN — CARVEDILOL 12.5 MG: 12.5 TABLET, FILM COATED ORAL at 20:59

## 2022-09-21 RX ADMIN — FAMOTIDINE 40 MG: 20 TABLET, FILM COATED ORAL at 19:30

## 2022-09-21 RX ADMIN — LORAZEPAM 0.5 MG: 2 INJECTION INTRAMUSCULAR; INTRAVENOUS at 19:10

## 2022-09-21 NOTE — PROGRESS NOTES
Assessment and Plan:    Problem List Items Addressed This Visit     Anxiety disorder - Primary     He is worse with everything of late  Has been worse lately  Seeing Psych on [id number]  Lorazepam is not helping with anxiety for now  Is quite severe with this as well  He is seeing what is worse in the near future  Would add Abilify for now as it would be faster acting than the SSRI's  Follow with Psychiatry Monday  Recommend increase Lorazepam to 1 tabs q8 PRN till seen by Psych, as he was on 1/2 tab  He did not want to take more as he was concerned about the sedation  After further discussion, patient has severe problems  I would recommend ER and admission to [facility] behavioral health  Relevant Orders    Transfer to other facility (Completed)    Transfer to other facility (Completed)    Panic attack     Extreme anxiety, with problems coping  Psych coming up Monday  Relevant Orders    Transfer to other facility (Completed)                 Diagnoses and all orders for this visit:    Generalized anxiety disorder  -     Transfer to other facility    Panic attack  -     Transfer to other facility    Anxiety disorder  -     Transfer to other facility            Subjective:      Patient ID: Talita Fischer is a 58 y o  male  CC:    Chief Complaint   Patient presents with    Follow-up       HPI:    Here to follow up on issues with regard to depression  Reviewed about panic  He wondered about what to do now  He did see counseling, and they reviewed about partial outpatient program     He is having more problems with sleeping  He is taking Lorazepam, and gets to sleep, but can't stay asleep, and then fixates on this  Has increased stuttering, and FOI  Here with sister today  Has tremors as well with all this  Is worse on left            The following portions of the patient's history were reviewed and updated as appropriate: allergies, current medications, past family history, past medical history, past social history, past surgical history and problem list       Review of Systems      Data to review:       Objective:    Vitals:    09/21/22 1454   BP: 138/82   BP Location: Right arm   Patient Position: Sitting   Cuff Size: Large   Pulse: 76   SpO2: 96%   Weight: 98 2 kg (216 lb 9 6 oz)   Height: 5' 8" (1 727 m)        Physical Exam

## 2022-09-21 NOTE — PROGRESS NOTES
CARELINK TRANSMISSION: LOOP RECORDER  PRESENTING RHYTHM NSR @ 79 BPM  BATTERY STATUS "OK " 16 DEVICE CLASSIFIED AF EPISODES, AVAILABLE STRIPS DEMONSTRATE NO ATRIAL FIBRILLATION  INSTEAD EGM'S SHOW SR W/ PACs,, PVCs, UNDERSENSING AND OVERSENSING  AF BURDEN IS 0 1%  AF BURDEN MAYBE OVERESTIMATED DUE TO INAPPROPRIATE CLASSIFICATION OF AF  SOME STRIPS MAY NOT BE INTERPRETABLE OR AVAILABLE, CANNOT DEFINITIVELY RULE OUT ATRIAL FIBRILLATION  HX OF PAF  PT TAKES COUMADIN AND COREG  NO PATIENT ACTIVATED EPISODES  NORMAL DEVICE FUNCTION   DL

## 2022-09-21 NOTE — PATIENT INSTRUCTIONS
Problem List Items Addressed This Visit       Anxiety disorder - Primary     He is worse with everything of late  Has been worse lately  Seeing Psych on [id number]  Lorazepam is not helping with anxiety for now  Is quite severe with this as well  He is seeing what is worse in the near future  Would add Abilify for now as it would be faster acting than the SSRI's  Follow with Psychiatry Monday  Recommend increase Lorazepam to 1 tabs q8 PRN till seen by Psych, as he was on 1/2 tab  He did not want to take more as he was concerned about the sedation  After further discussion, patient has severe problems  I would recommend ER and admission to [facility] behavioral health  Relevant Orders    Transfer to other facility    Transfer to other facility    Panic attack     Extreme anxiety, with problems coping  Psych coming up Monday  Relevant Orders    Transfer to other facility            eKndall Nagel 83 was advised to limit contact with others to essential tasks such as getting food, medications, and medical care  Proper handwashing reviewed, and Hand sanitzer when washing is not available  If the patient develops symptoms of COVID 19, the patient should call the office as soon as possible  For [id number] Flu season, it is strongly recommended that Flu Vaccinations be obtained  Virtual Visits:  Cr: This works on smart phones (any phone with Internet browsing capability)  You should get a text message when the provider is ready to see you  Click on the link in the text message, and the call should start  You will need to type in your name, and allow camera and microphone access  This is HIPPA compliant, and secure  If you have not already done so, get immunized to COVID 19        We are committed to getting you vaccinated as soon as possible and will be closely following CDC and [facility]  guidelines as they are released and revised  Please refer to our COVID-19 vaccine webpage for the most up to date information on the vaccine and our distribution efforts  This site will also have the most up to date recommendations for COVID booster vaccine  Jose gallegos    Call [phone] ([phone]), option [phone]    OUR NEW LOCATION:    [address], [address] [place], [place] [address] [place], [place], [address]  Fax: [phone]    Lab services and OB/GYN are at this location as well

## 2022-09-21 NOTE — ASSESSMENT & PLAN NOTE
He is worse with everything of late  Has been worse lately  Seeing Psych on [id number]  Lorazepam is not helping with anxiety for now  Is quite severe with this as well  He is seeing what is worse in the near future  Would add Abilify for now as it would be faster acting than the SSRI's  Follow with Psychiatry Monday  Recommend increase Lorazepam to 1 tabs q8 PRN till seen by Psych, as he was on 1/2 tab  He did not want to take more as he was concerned about the sedation  After further discussion, patient has severe problems  I would recommend ER and admission to [facility] behavioral health

## 2022-09-21 NOTE — ED PROVIDER NOTES
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, cardiac disease, AFib anticoagulated on warfarin (taking consistently), who presents to the ED today at the recommendation of of his primary doctor due to "anxiety attack" and his primary doctor recommended that he seek inpatient psychiatric care  Patient denies SI/HI/AH/VH  He states that he has a appointment with psychiatrist on Monday but stated his doctor said he would "take care of it" because he is coming into the ED  Patient states that earlier today he experienced diaphoresis, shaking in his lower extremities, and bilateral flank and paraspinal discomfort that is characterized as mild tightness  This is characteristic of prior panic/anxiety attacks  Patient states that being alone in losing his social [place] set this one off  Patient states that pacing and Ativan at home remits his symptoms  Patient denies chest pain, shortness of breath, headache, weakness, numbness, IVDU, alcohol use, bowel or bladder incontinence, urinary retention  Patient's symptoms have been constant since her onset but improving overall  Patient is without other concerns, other than taking his evening blood pressure medication because he has not taken it yet, at this time      - No language barrier    - History obtained from patient  - There are no limitations to the history obtained  - Previous charting was reviewed          Prior to Admission Medications   Prescriptions Last Dose Informant Patient Reported? Taking?    Klor-Con M20 20 MEQ tablet  Self No No   Sig: TAKE 1 TABLET BY MOUTH EVERY DAY   LORazepam (ATIVAN) 0 5 mg tablet  Self No No   Sig: Take 1 tablet (0 5 mg total) by mouth every 8 (eight) hours as needed for anxiety   acetaminophen (TYLENOL) 500 mg tablet  Self Yes No   Sig: Take 500 mg by mouth every 6 (six) hours as needed for mild pain 2 tab   amoxicillin (AMOXIL) 500 mg capsule  Self Yes No   atorvastatin (LIPITOR) 40 mg tablet  Self No No   Sig: Take 1 tablet (40 mg total) by mouth daily with dinner   carvedilol (COREG) 12 5 mg tablet   No No   Sig: TAKE 1 TABLET BY MOUTH TWICE A DAY WITH FOOD   famotidine (PEPCID) 40 MG tablet  Self No No   Sig: Take 1 tablet (40 mg total) by mouth 2 (two) times a day   furosemide (LASIX) 20 mg tablet  Self No No   Sig: TAKE 1 TABLET BY MOUTH EVERY DAY   mirtazapine (REMERON) 15 mg tablet  Self No No   Sig: Take 1 tablet (15 mg total) by mouth daily at bedtime   pantoprazole (PROTONIX) 40 mg tablet   No No   Sig: TAKE 1 TABLET BY MOUTH EVERY DAY   Patient taking differently: 2 (two) times a day   warfarin (COUMADIN) 3 mg tablet  Self No No   Sig: Do not fill continue to take as prior plan   warfarin (COUMADIN) 4 mg tablet   No No   Sig: TAKE 1 TABLET BY MOUTH EVERY DAY   warfarin (COUMADIN) 5 mg tablet  Self No No   Sig: CONTINUE TO TAKE AS PRIOR DO NOT FILL      Facility-Administered Medications: None       Past Medical History:   Diagnosis Date    Aneurysm of thoracic aorta (HCC)     last assessed 11/20/17    Anxiety     currently on no meds    Arthritis     Bilateral inguinal hernia     Cardiac disease     Depression     GERD (gastroesophageal reflux disease)     Hearing loss of aging     Hyperlipidemia     Hypertension     Irritable bowel syndrome     Kidney stone     Obesity     Occasional tremors     left arm since stroke    Palpitations     PONV (postoperative nausea and vomiting)     and headache x 3 days    Sciatica     right and left  side    Shortness of breath     on exertion    Sleep apnea     is not using a CPAP    Spitting up blood 05/21/2021    Resolved    Status post placement of implantable loop recorder     Stroke [facility]) 11/2014    Stroke ([address] ) 03/2021    TIA (transient ischemic attack)        Past Surgical History:   Procedure Laterality Date    AORTA SURGERY      thoracic - aneurysmorrhaphy    APPENDECTOMY      ASCENDING AORTIC ANEURYSM REPAIR      resolved 8/19/15    CARDIAC LOOP RECORDER      CHOLECYSTECTOMY      laparoscopic    COLONOSCOPY      CYSTOSCOPY      with removal of object- stent removal    KNEE ARTHROSCOPY Right 1996    with medial meniscus repair    LITHOTRIPSY      renal    ORTHOPEDIC SURGERY      HI FRAGMENT KIDNEY STONE/ ESWL Left 5/17/2018    Procedure: LITHROTRIPSY EXTRACORPORAL SHOCKWAVE (ESWL); Surgeon: Bakari Monroe MD;  [facility];  Service: Urology    HI Ameena Zackary 19 Bilateral 12/9/2021    Procedure: ROBOTIC REPAIR HERNIA INGUINAL;  Surgeon: Kamari Tan MD;  Location: [facility];  Service: General    THUMB ARTHROSCOPY Right 1976    ligament repair    UPPER GASTROINTESTINAL ENDOSCOPY         Family History   Problem Relation Age of Onset    Diverticulitis Mother         of colon    Nephrolithiasis Mother     Emphysema Father     Nephrolithiasis Sister     Heart attack Maternal Grandfather 72    Breast cancer Paternal Grandmother     Lung cancer Paternal Grandfather     Cancer Family     Coronary artery disease Family     Diabetes Family         sibling    Hypertension Family         sibling    Hernia Family         sibling     I have reviewed and agree with the history as documented      E-Cigarette/Vaping    E-Cigarette Use Never User      E-Cigarette/Vaping Substances    Nicotine No     THC No     CBD No     Flavoring No     Other No     Unknown No      Social History     Tobacco Use    Smoking status: Never Smoker    Smokeless tobacco: Never Used    Tobacco comment: no second hand smoke exposure   Vaping Use    Vaping Use: Never used   Substance Use Topics    Alcohol use: Not Currently    Drug use: No        Review of Systems   Constitutional: Positive for diaphoresis  Negative for fever  HENT: Negative for trouble swallowing  Eyes: Negative for visual disturbance  Respiratory: Negative for shortness of breath  Cardiovascular: Negative for chest pain  Gastrointestinal: Negative for abdominal pain (Bilateral paraspinal/flank)  Endocrine: Negative for polyuria  Genitourinary: Negative for dysuria  Musculoskeletal: Negative for gait problem  Skin: Negative for rash  Allergic/Immunologic: Positive for environmental allergies  Neurological: Negative for weakness and numbness  Hematological: Negative for adenopathy  Psychiatric/Behavioral: Negative for suicidal ideas  The patient is nervous/anxious  All other systems reviewed and are negative  Physical Exam  ED Triage Vitals [09/21/22 1655]   Temperature Pulse Respirations Blood Pressure SpO2   98 °F (36 7 °C) 68 18 (!) 168/134 97 %      Temp Source Heart Rate Source Patient Position - Orthostatic VS BP Location FiO2 (%)   Tympanic Monitor Sitting Left arm --      Pain Score       No Pain             Orthostatic Vital Signs  Vitals:    09/21/22 1655   BP: (!) 168/134   Pulse: 68   Patient Position - Orthostatic VS: Sitting       Physical Exam  Vitals and nursing note reviewed  Constitutional:       General: He is not in acute distress  Appearance: He is obese  He is not toxic-appearing or diaphoretic  Comments: Patient appears comfortable during my evaluation    HENT:      Head: Normocephalic and atraumatic  Right Ear: External ear normal       Left Ear: External ear normal       Nose: Nose normal  No rhinorrhea  Mouth/Throat:      Mouth: Mucous membranes are moist       Pharynx: Oropharynx is clear  No oropharyngeal exudate or posterior oropharyngeal erythema  Eyes:      General: No scleral icterus  Right eye: No discharge  Left eye: No discharge        Extraocular Movements: Extraocular movements intact  Conjunctiva/sclera: Conjunctivae normal       Pupils: Pupils are equal, round, and reactive to light  Neck:      Comments: Patient is spontaneously rotating their neck to the left and right during the history and physical exam interaction without difficulty or apparent discomfort    Cardiovascular:      Rate and Rhythm: Normal rate and regular rhythm  Pulses: Normal pulses  Heart sounds: Normal heart sounds  No murmur heard  No friction rub  No gallop  Comments: 2+ Radial  Pulmonary:      Effort: Pulmonary effort is normal  No respiratory distress  Breath sounds: Normal breath sounds  No stridor  No wheezing, rhonchi or rales  Abdominal:      General: Abdomen is flat  There is no distension  Palpations: Abdomen is soft  Tenderness: There is no abdominal tenderness  There is no right CVA tenderness, left CVA tenderness, guarding or rebound  Musculoskeletal:         General: No tenderness (No calf tenderness to palpation) or deformity  Cervical back: Neck supple  No tenderness  No muscular tenderness  Right lower leg: No edema  Left lower leg: No edema  Comments: No midline C, T, L-spine tenderness to palpation   Lymphadenopathy:      Cervical: No cervical adenopathy  Skin:     General: Skin is warm and dry  Capillary Refill: Capillary refill takes less than 2 seconds  Neurological:      Mental Status: He is alert  Comments: Cranial nerves 2-12 intact  5/5 strength in all four extremities  Sensation to light touch intact in all four extremities  No pronator drift  Normal finger-to-nose  Patient able to ambulate without difficulty  Patient is speaking clearly in complete sentences  Patient is answering appropriately and able follow commands  Psychiatric:         Behavior: Behavior normal       Comments: Anxious mood and affect  Linear goal directed thought process    Patient dressed appropriately in street clothes  Patient cooperative    Patient denies SI/HI/AH/VH         ED Medications  Medications   LORazepam (ATIVAN) injection 0 5 mg (has no administration in time range)   carvedilol (COREG) tablet 12 5 mg (has no administration in time range)   warfarin (COUMADIN) tablet 4 mg (has no administration in time range)   famotidine (PEPCID) tablet 40 mg (has no administration in time range)       Diagnostic Studies  Results Reviewed     Procedure Component Value Units Date/Time    CBC and differential [[id number]]     Lab Status: No result Specimen: Blood     Comprehensive metabolic panel [[id number]]     Lab Status: No result Specimen: Blood     HS Troponin 0hr (reflex protocol) [[id number]]     Lab Status: No result Specimen: Blood     Protime-INR [[id number]]     Lab Status: No result Specimen: Blood     COVID/FLU/RSV [[id number]]     Lab Status: No result Specimen: Nares from Nose     POCT alcohol breath test [[id number]]     Lab Status: No result     Rapid drug screen, urine [[id number]]     Lab Status: No result Specimen: Urine     UA w Reflex to Microscopic w Reflex to Culture [[id number]]     Lab Status: No result Specimen: Urine                  XR chest 1 view portable    (Results Pending)         Procedures  Procedures      ED Course  ED Course as of 09/21/22 1849   Wed Sep 21, 2022   1849 Patient signed out prior to final disposition                                       MDM  Number of Diagnoses or Management Options  Anxiety attack  Diaphoresis  Elevated blood pressure reading  Diagnosis management comments: Patient is a 70-year-old male, with a history significant for anxiety/depression, ascending thoracic aneurysm status post repair, prior stroke with residual tremor in the left upper extremity, cardiac disease, AFib anticoagulated on warfarin (taking consistently), who presents to the ED today at the recommendation of of his primary doctor due to "anxiety attack" and his primary doctor recommended that he seek inpatient psychiatric care  Patient denies SI/HI/AH/VH  He states that he has a appointment with psychiatrist on Monday but stated his doctor said he would "take care of it" because he is coming into the ED  Patient states that earlier today he experienced diaphoresis, shaking in his lower extremities, and bilateral flank and paraspinal discomfort that is characterized as mild tightness  This is characteristic of prior panic/anxiety attacks  Patient states that being alone in losing his social [place] set this one off  Patient states that pacing and Ativan at home remits his symptoms  Patient denies chest pain, shortness of breath, headache, weakness, numbness, IVDU, alcohol use, bowel or bladder incontinence, urinary retention  Patient is currently afebrile and hemodynamically stable  His physical exam is notable for anxious mood and affect  This presentation is concerning for:  Panic attack  I also considered ACS, UTI  Low clinical suspicion for pulmonary embolism given consistent anticoagulation adherence  Will investigate with cardiac workup, UA, crisis labs  Will have crisis evaluate  Will manage with Ativan  No grounds for 302  Disposition  Final diagnoses:   Anxiety attack   Elevated blood pressure reading   Diaphoresis     Time reflects when diagnosis was documented in both MDM as applicable and the Disposition within this note     Time User Action Codes Description Comment    9/21/2022  6:29 PM Edmundo HERNANDEZ Add [F41 0] Anxiety attack     9/21/2022  6:29 PM Edmundo HERNANDEZ Add [R03 0] Elevated blood pressure reading     9/21/2022  6:29 PM Cindy Velazquez [id number]       ED Disposition     None      Follow-up Information    None         Patient's Medications   Discharge Prescriptions    No medications on file     No discharge procedures on file      PDMP Review       Value Time User    PDMP Reviewed  Yes 9/21/2022  3:38 PM Ladi Monterroso MD           ED Provider  Attending physically available and evaluated Hollyt Willard  I managed the patient along with the ED Attending      Electronically Signed by         Juanis Mclaughlin MD  09/21/22 2169

## 2022-09-21 NOTE — TELEPHONE ENCOUNTER
Behavorial Health Outpatient Intake Questions    Referred by: Juanis Kirkland/PCP    Please advised interviewee that they need to answer all questions truthfully to allow for best care and any misrepresentations of information may affect their ability to be seen at this clinic   => Was this discussed? Yes     [facility] -     Presenting Problem (in patient's words): Three grossman, current medication not working  Heart issue, anxiety, anxious all the time    Are there any developmental disabilities? ? If yes, can they speak to you on the phone? If they are too limited to speak to you on phone, refer out No    Are you taking any psychiatric medications? Yes    => If yes, who prescribes? If yes, are they injectable medications? Remeron  Ativan    Does the patient have a language barrier or hearing impairment? No    Have you been treated at [facility] by a therapist or a doctor in the past? If yes, who? Yes  Saw Valente Lucass once  Patient thought she was a dr     Has the patient been hospitalized for mental health? Yes   If yes, how long ago was last hospitalization and where was it?  2022    Do you actively use alcohol or marijuana or illegal substances? If yes, what and how much - refer out to Drug and alcohol treatment if use is excessive or daily use of illegal substances No concerns of substance abuse are reported  Do you have a community treatment team or ? No    Legal History-     Does the patient have any history of arrests, [facility]/half-way time, or DUIs? No  If Yes-  1) What types of charges? 2) When were they last incarcerated? 3) Are they currently on parole or probation? Minor Child-    Who has custody of the child? Is there a custody agreement? If there is a custody agreement remind parent that they must bring a copy to the first appt or they will not be seen       Intake Team, please check with provider before scheduling if flags come up such as:  - complex case  - legal history (other than DUI)  - communication barrier concerns are present  - if, in your judgment, this needs further review    ACCEPTED as a patient Yes  => Appointment Date: 9/26/2022 at 145 with Belgica Agrawal    Referred Elsewhere? No    Name of Insurance Co: [employer] [facility]#[id number]  Insurance Phone #  If ins is primary or secondary:Primary  If patient is a minor, parents information such as Name, D  O B of guarantor

## 2022-09-22 ENCOUNTER — TELEPHONE (OUTPATIENT)
Dept: PSYCHIATRY | Facility: CLINIC | Age: 62
End: 2022-09-22

## 2022-09-22 VITALS
WEIGHT: 214 LBS | TEMPERATURE: 98 F | OXYGEN SATURATION: 97 % | HEART RATE: 83 BPM | BODY MASS INDEX: 32.54 KG/M2 | SYSTOLIC BLOOD PRESSURE: 160 MMHG | DIASTOLIC BLOOD PRESSURE: 102 MMHG | RESPIRATION RATE: 18 BRPM

## 2022-09-22 LAB
ATRIAL RATE: 70 BPM
P AXIS: 51 DEGREES
PR INTERVAL: 148 MS
QRS AXIS: 16 DEGREES
QRSD INTERVAL: 82 MS
QT INTERVAL: 380 MS
QTC INTERVAL: 410 MS
T WAVE AXIS: -2 DEGREES
VENTRICULAR RATE: 70 BPM

## 2022-09-22 PROCEDURE — 93010 ELECTROCARDIOGRAM REPORT: CPT | Performed by: INTERNAL MEDICINE

## 2022-09-22 PROCEDURE — 96376 TX/PRO/DX INJ SAME DRUG ADON: CPT

## 2022-09-22 RX ORDER — FUROSEMIDE 20 MG/1
20 TABLET ORAL DAILY
Status: DISCONTINUED | OUTPATIENT
Start: 2022-09-22 | End: 2022-09-22 | Stop reason: HOSPADM

## 2022-09-22 RX ORDER — LORAZEPAM 2 MG/ML
0.5 INJECTION INTRAMUSCULAR EVERY 8 HOURS PRN
Status: DISCONTINUED | OUTPATIENT
Start: 2022-09-22 | End: 2022-09-22 | Stop reason: HOSPADM

## 2022-09-22 RX ORDER — FAMOTIDINE 20 MG/1
40 TABLET, FILM COATED ORAL 2 TIMES DAILY
Status: DISCONTINUED | OUTPATIENT
Start: 2022-09-22 | End: 2022-09-22 | Stop reason: HOSPADM

## 2022-09-22 RX ORDER — CARVEDILOL 12.5 MG/1
12.5 TABLET ORAL 2 TIMES DAILY WITH MEALS
Status: DISCONTINUED | OUTPATIENT
Start: 2022-09-22 | End: 2022-09-22

## 2022-09-22 RX ORDER — DIAZEPAM 5 MG/1
5 TABLET ORAL ONCE
Status: COMPLETED | OUTPATIENT
Start: 2022-09-22 | End: 2022-09-22

## 2022-09-22 RX ORDER — CARVEDILOL 12.5 MG/1
12.5 TABLET ORAL 2 TIMES DAILY WITH MEALS
Status: DISCONTINUED | OUTPATIENT
Start: 2022-09-22 | End: 2022-09-22 | Stop reason: HOSPADM

## 2022-09-22 RX ORDER — LORAZEPAM 0.5 MG/1
0.5 TABLET ORAL ONCE
Status: COMPLETED | OUTPATIENT
Start: 2022-09-22 | End: 2022-09-22

## 2022-09-22 RX ORDER — WARFARIN SODIUM 2 MG/1
4 TABLET ORAL
Status: DISCONTINUED | OUTPATIENT
Start: 2022-09-22 | End: 2022-09-22 | Stop reason: HOSPADM

## 2022-09-22 RX ORDER — ATORVASTATIN CALCIUM 40 MG/1
40 TABLET, FILM COATED ORAL
Status: DISCONTINUED | OUTPATIENT
Start: 2022-09-22 | End: 2022-09-22 | Stop reason: HOSPADM

## 2022-09-22 RX ORDER — DOCUSATE SODIUM 100 MG/1
100 CAPSULE, LIQUID FILLED ORAL ONCE
Status: COMPLETED | OUTPATIENT
Start: 2022-09-22 | End: 2022-09-22

## 2022-09-22 RX ORDER — MIRTAZAPINE 15 MG/1
7.5 TABLET, FILM COATED ORAL
Status: DISCONTINUED | OUTPATIENT
Start: 2022-09-22 | End: 2022-09-22

## 2022-09-22 RX ORDER — POTASSIUM CHLORIDE 20 MEQ/1
20 TABLET, EXTENDED RELEASE ORAL DAILY
Status: DISCONTINUED | OUTPATIENT
Start: 2022-09-22 | End: 2022-09-22 | Stop reason: HOSPADM

## 2022-09-22 RX ORDER — MIRTAZAPINE 15 MG/1
15 TABLET, FILM COATED ORAL
Status: DISCONTINUED | OUTPATIENT
Start: 2022-09-22 | End: 2022-09-22 | Stop reason: HOSPADM

## 2022-09-22 RX ADMIN — FUROSEMIDE 20 MG: 20 TABLET ORAL at 10:20

## 2022-09-22 RX ADMIN — POTASSIUM CHLORIDE 20 MEQ: 1500 TABLET, EXTENDED RELEASE ORAL at 10:20

## 2022-09-22 RX ADMIN — FAMOTIDINE 40 MG: 20 TABLET, FILM COATED ORAL at 10:21

## 2022-09-22 RX ADMIN — CARVEDILOL 12.5 MG: 12.5 TABLET, FILM COATED ORAL at 10:21

## 2022-09-22 RX ADMIN — LORAZEPAM 0.5 MG: 0.5 TABLET ORAL at 01:21

## 2022-09-22 RX ADMIN — MIRTAZAPINE 7.5 MG: 15 TABLET, FILM COATED ORAL at 03:38

## 2022-09-22 RX ADMIN — LORAZEPAM 0.5 MG: 2 INJECTION INTRAMUSCULAR; INTRAVENOUS at 10:22

## 2022-09-22 RX ADMIN — DOCUSATE SODIUM 100 MG: 100 CAPSULE, LIQUID FILLED ORAL at 12:18

## 2022-09-22 RX ADMIN — DIAZEPAM 5 MG: 5 TABLET ORAL at 07:02

## 2022-09-22 NOTE — ED ATTENDING ATTESTATION
9/21/2022  ITed DO, saw and evaluated the patient  I have discussed the patient with the resident/non-physician practitioner and agree with the resident's/non-physician practitioner's findings, Plan of Care, and MDM as documented in the resident's/non-physician practitioner's note, except where noted  All available labs and Radiology studies were reviewed  I was present for key portions of any procedure(s) performed by the resident/non-physician practitioner and I was immediately available to provide assistance  At this point I agree with the current assessment done in the Emergency Department  I have conducted an independent evaluation of this patient a history and physical is as follows:    70-year-old male presents a for evaluation  Patient has history of anxiety and depression and family doctor referred him to the emergency department for psychiatric eval   Patient denies SI and HI  No visual or auditory hallucinations  On exam-no acute distress but appears anxious, heart regular, no respiratory distress    Plan-check labs, consult crisis    ED Course         Critical Care Time  Procedures

## 2022-09-22 NOTE — ED NOTES
Pt is a 58 y o  male who was brought to the ED per recommendation of PCP due to ongoing anxiety and panic attacks  Patient states that he has been experiencing daily panic attacks that last from 20minutes-4hours  Patient has experienced these attacks in the distant past after being hospitalized for an aneurysm and having a stroke  Patient reports experiencing new health concerns at the end of August which led to him being prescribed ativan for his anxiety  Patient has been experiencing ongoing fear that something is wrong with him medically  Patient states that since that time he has become more preoccupied with the timing of his doses and if he's feeling relief from them  Patient states he is often alone and isolated at home and finds himself just pacing to help ease the loop of thoughts in his brain  Patient states the ativan will help him sleep like 3-4 hours but then he wakes up and feels foggy and then cannot fall back to sleep  Patient feels like his anxiety has led to him not being able to enjoy the things he used to or be able to be in social situations  Patient states he fears being alone and worries about something happening and nobody knowing  Patient denies any suicidal/homicidal ideations and auditory/visual hallucinations  Patient believes he was on a psychiatric unit in 2015 due to panic leading up to a heart surgery, but it is unclear if it was an actual mental health admission  Patient states he followed with psychiatry for a short period after his surgery, but ultimately stopped medications and has not seen anyone since  Patient does have an appointment scheduled with [facility] psychiatry on Monday, however, patient reports his PCP told him he would 'take care of it' when he sent patient to the ER  Patient is worried that appointment might have been cancelled   Patient states that he feels he needs an inpatient admission because of his lack of confidence, perseverating thoughts on his medication timing, and anhedonia  Discussed with patient the process for inpatient treatment and the need to bed search with placement possibly out of the area  Patient states that he needs to stay close to home and would not be able to go somewhere else  Spoke with patient about a referral to a partial program, however, he worries about not being able to drive if he takes ativan at night and being unsure if his phone would support virtual visits  Patient expressed concern with being discharged without being sure that his psychiatric appointment is still set for Monday  Patient is comfortable remaining in the ED until verification that his appointment is still available on Monday  However, throughout assessment he continues to ask about reactions between medications prescribed by his PCP and differences in ativan received in the ER (through IV) and his oral dose at home  Chief Complaint   Patient presents with    Anxiety     Pt c/o severe anxiety, reports being compliant with medications  Denies SI/HI/AH/VH       Intake Assessment completed, Safety risk Assessment completed    EDEN Man  09/21/22    [id number]

## 2022-09-22 NOTE — TELEPHONE ENCOUNTER
From [facility] ER called to confirm pt appt for 9/26 did not get cancelled by pt pcp

## 2022-09-22 NOTE — ED NOTES
Reviewed note from second shift that states "Patient expressed concern with being discharged without being sure that his psychiatric appointment is still set for Monday  Patient is comfortable remaining in the ED until verification that his appointment is still available on Monday"     First shift to follow up and identify if inpatient psychiatric treatment is indicated  Patient may not require it and be discharged for appointment on Monday        Ellie Baron, [address] [place]  9/22/22  [id number]

## 2022-09-22 NOTE — ED NOTES
There are no local beds available today  Spoke with pt regarding his appointment Monday at [facility] outpatient psychiatry  He feels safe for d//c home and wait for the appointment next week  Pt has continued to denies suicidal or homicidal ideations as well as hallucinations  Pt will return to the ed if his symptoms worsen

## 2022-09-22 NOTE — ED NOTES
Patient pleasant and cooperative, however rigid and perseverative  Easily redirected and comforted with direct, clear communication  Acknowledges his routine and coping skills have recently been overturned, and a lack of stability in that area has caused increased anxiety  Pt daytime medications ordered per pt home schedule, pt remains pleasant and cooperative        [address], RN  09/22/22 [id number]

## 2022-09-26 ENCOUNTER — OFFICE VISIT (OUTPATIENT)
Dept: PSYCHIATRY | Facility: CLINIC | Age: 62
End: 2022-09-26

## 2022-09-26 VITALS — BODY MASS INDEX: 32.08 KG/M2 | WEIGHT: 211 LBS

## 2022-09-26 DIAGNOSIS — G47.00 INSOMNIA, UNSPECIFIED TYPE: ICD-10-CM

## 2022-09-26 DIAGNOSIS — F33.2 MDD (MAJOR DEPRESSIVE DISORDER), RECURRENT SEVERE, WITHOUT PSYCHOSIS (HCC): Primary | ICD-10-CM

## 2022-09-26 DIAGNOSIS — F41.0 PANIC DISORDER WITHOUT AGORAPHOBIA: ICD-10-CM

## 2022-09-26 DIAGNOSIS — F41.1 GAD (GENERALIZED ANXIETY DISORDER): ICD-10-CM

## 2022-09-26 PROCEDURE — NC001 PR NO CHARGE: Performed by: PSYCHIATRY & NEUROLOGY

## 2022-09-26 RX ORDER — SERTRALINE HYDROCHLORIDE 25 MG/1
25 TABLET, FILM COATED ORAL DAILY
Qty: 30 TABLET | Refills: 0 | Status: SHIPPED | OUTPATIENT
Start: 2022-09-26 | End: 2022-10-26

## 2022-09-26 RX ORDER — MIRTAZAPINE 15 MG/1
22.5 TABLET, FILM COATED ORAL
Qty: 30 TABLET | Refills: 2 | Status: SHIPPED | OUTPATIENT
Start: 2022-09-26 | End: 2022-09-28

## 2022-09-26 NOTE — PATIENT INSTRUCTIONS
Continue taking ativan 0 5 three times daily (every eight hours)  Start taking Zoloft 25 mg (1 tablet) daily in the morning  Increase the amount of Remeron you take to 22 5 mg (1 5 tabs) every night for sleep    Start taking melatonin for sleep  Start taking 3 mg every night for help with sleep  Recommendations regarding insomnia:  Wake-up at the same time every day  Refrain from "napping"  Refrain from going to bed unless you're tired  Utilize your bedroom for sleep only  Avoid use of electronics including television and/or cellphone/computers  Refrain from use of electronics including television and/or cellphones/computers prior to bed  Turn your alarm clock away so the light is not visible  Attempt relaxation using various means like reading if you're restless in bed for approximately 15-20 minutes  Participate in regular physical activities like exercise, although avoid approximately 3-4 hours prior to bed  Morning exercise is ideal   Avoid caffeine use prior to bedtime  Consider tapering down excessive use of caffeine  Avoid tobacco use prior to bedtime  Avoid alcohol use prior to bedtime  Consider reading "No More Sleepless nights" by Fredy Collier, Ph D   Consider use of online resources including:  http://[employer]/cbt-online-insomnia-treatment html  [employer]  com  CBT-I   Go! To Sleep by the [facility]  Please present for your previously scheduled appointment approximately 15 minutes prior to appointment time  If you are running late or are unable to attend your appointment, please call our [place] office at [phone] or our [place] office at [phone] if applicable to notify the office of your absence  If you are in psychological crisis including not limited to suicidal/homicidal thoughts or thoughts of self-injury, do not hesitate to call/contact your [facility] hotline (see below) or attend to the nearest emergency department    [facility] Crisis: [address] [facility] Crisis: [phone]  [address] Crisis: [address] Crisis: [address] Crisis: [address] Crisis: [address]  Crisis: [phone]  National Suicide Prevention Hotline: 6-925.843.6444

## 2022-09-26 NOTE — BH TREATMENT PLAN
TREATMENT PLAN        [facility]    Name and Date of Birth:  Arlene Curran 58 y o  1960  Date of Treatment Plan: September 26, 2022  Diagnosis/Diagnoses:    1  MDD (major depressive disorder), recurrent severe, without psychosis ([facility] [address] )    2  SHIMON (generalized anxiety disorder)    3  Panic disorder without agoraphobia    4  Insomnia, unspecified type        Strengths/Personal Resources for Self-Care: supportive family, taking medications as prescribed, ability to listen, independence, motivation for treatment, being resoureceful, self-reliance, sense of humor, willingness to work on problems    Area/Areas of need: anxiety, anxiety symptoms, depression, depressive symptoms, sleep problems, attention and concentration problems, difficulty tolerating medications, lack of energy, lack of motivation, lack of sleep, poor appetite, poor concentration, poor physical health, unstable mood    Long Term Goal: improve control ofsleep  Target Date: 6 months - March 26, 2023  Person/Persons responsible for completion of goal: ELMA and Radha Aleman MD     Short Term Objective (s) - How will we reach this goal?:   1  Take medications as prescribed  2  Attend psychiatry appointments regularly  Target Date: 1 month - October 26, 2022  Person/Persons Responsible for Completion of Goal: ELMA     Progress Towards Goals: Continuing treatment    Treatment Modality: medication management every 1-3 months as needed  Review due 180 days from date of this plan: March 25, 2023   Expected length of service: Ongoing treatment    My physician and I have developed this plan together, and I agree to work on the goals and objectives  I understand the treatment goals that were developed for my treatment  The treatment plan was created between Radha Aleman MD and Arlene Curran on 09/26/22 at 4:38 PM but not signed at the time of the visit due to [address] [place] [address]   The plan was reviewed, and verbal consent was given

## 2022-09-26 NOTE — PSYCH
Psychiatric Evaluation - Behavioral Health   MRN: [id number]    Chief Complaint: "I cant sleep, my anxiety is so bad"    History of Present Illness     Joey Stevens is a 58 y o  male with previous psychiatric history significant for depression, anxiety, medically complicated by stroke x2 (2014, last march 2019), a  fib (on warfarin) with residual tremor (left upper extremity), HTN, HLD, CORIE, ataxia, lower back pain, dizziness, h/o aortic aneurysm repair (2014), referred by his PCP, Ruben Fonseca MD on 08/30/2022, currently here today reporting worsening depression and anxiety, presenting to the [address] [place] [address] E outpatient clinic for a full psychiatric intake assessment including evaluation for medication and psychotherapy  On interview accompanied with a sister  Patient is a difficult historian, very anxious, stuttering at times, perseverating on medication schedule, but interviewer was able to discuss medications in detail and plan moving forward with the patient understanding and in agreement  Mandy Castro was in the ED on 9/21/22 (5 days ago) for anxiety/panic attack symptoms of diaphoresis, shaking in lower extremities, paraspinal discomfort after being sent by his PCP, which began after feeling alone after losing his social [employer], trying Ativan which did not help  At that time, he denied SI, HI, AVH and was discharged with plan to follow-up with psychiatry appointment  Mandy Castro is currently taking home medications of Remeron 15 mg q h s  and Ativan 0 5 mg TID p r n  (was put on this dosage for 1 5 months since 8/11/2022 by his PCP for anxiety control, tyo hold patient over until psych appointment)  Mandy Castro reports that he has had worsening anxiety and sleep, increasingly more panic attacks, depressed mood, and difficulty coping after what he explains his favorite sandwich shop, 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    He stopped working in 2014, he states, and this is when begin regularly going to the sandwich shop  He had minimal anxiety and when going to the sandwich shop because everyone was friendly, he spent his days there watching TV and being around people  He was not drinking alcohol during this  When the shop closed, he had no social life, stated home all day with slowly worsening anxiety, living with his sister  In the setting of stressors surrounding his worsening medical conditions, his anxiety continue to worsen in addition to development of depression and insomnia  Since August, he explains his anxiety, depression, and insomnia have worsened significantly  He was started on Remeron and Ativan 1 5 months ago with minimal improvement  Today, he reports poor sleep, sleeping 4 hours a night and difficulty falling and staying asleep  Due to this, he has daytime fatigue and tiredness  His anxiety has gotten severe to the point that he paces the rooms of his house when anxiety worsens, stutters regularly, has a worsened left arm tremor, and has around 1 panic attack daily  He is able to take care of his activities of daily living, but states it is difficult at times due to his anxiety and dysphoria  He denies SI, HI, AVH, OCD behaviors, manic episodes, PTSD symptoms, or any somatic symptoms outside of his left arm tremors  Psychiatric Review Of Systems:  Melissa Naylor reports Significantly depressed mood, Insomnia, Hypersomnia, Anhedonia, Low motivation, Helplessness, Low energy/easy fatigability, Poor focus/concentration, Decreased appetite, Significant anxiety, Muscle tension, Restlessness and Panic attacks    Melissa Naylor denies Current suicidal thoughts, plan, or intent, Current thoughts of self-harm, Current homicidal thoughts, plan, or intent, Obsessive or compulsive symptoms, Trauma related symptoms, Hallucinations, Paranoid thoughts, History consistent with jasson or hypomania, Increased goal-directed activity, Decreased need for sleep or Excessive talkativeness  Medical Review Of Systems:  anxiety, Complete review of systems is negative except as noted above     --------------------------------------  Past Medical History:   Diagnosis Date    [facility]     last assessed 11/20/17    Anxiety     currently on no meds    Arthritis     Bilateral inguinal hernia     Cardiac disease     Depression     GERD (gastroesophageal reflux disease)     Hearing loss of aging     Hyperlipidemia     Hypertension     Irritable bowel syndrome     Kidney stone     Obesity     Occasional tremors     left arm since stroke    Palpitations     PONV (postoperative nausea and vomiting)     and headache x 3 days    Sciatica     right and left  side    Shortness of breath     on exertion    Sleep apnea     is not using a CPAP    Spitting up blood 05/21/2021    Resolved    Status post placement of implantable loop recorder     Stroke ([facility] ) 11/2014    St[facility] 03/2021    TIA (transient ischemic attack)       Past Surgical History:   Procedure Laterality Date    AORTA SURGERY      thoracic - aneurysmorrhaphy    APPENDECTOMY      ASCENDING AORTIC ANEURYSM REPAIR      resolved 8/19/15    CARDIAC LOOP RECORDER      CHOLECYSTECTOMY      laparoscopic    COLONOSCOPY      CYSTOSCOPY      with removal of object- stent removal    KNEE ARTHROSCOPY Right 1996    with medial meniscus repair    LITHOTRIPSY      renal    ORTHOPEDIC SURGERY      IN FRAGMENT KIDNEY STONE/ ESWL Left 5/17/2018    Procedure: LITHROTRIPSY EXTRACORPORAL SHOCKWAVE (ESWL);   Surgeon: Yair Moy MD;  [facility];  Service: Urology    IN [address] Bilateral 12/9/2021    Procedure: ROBOTIC REPAIR HERNIA INGUINAL;  Surgeon: Reinier Hutchinson MD;  Location: [facility];  Service: General    THUMB ARTHROSCOPY Right 1976    ligament repair    UPPER GASTROINTESTINAL ENDOSCOPY       Family History   Problem Relation Age of Onset    Diverticulitis Mother         of colon    Nephrolithiasis Mother     Emphysema Father     Nephrolithiasis Sister     Heart attack Maternal Grandfather 72    Breast cancer Paternal Grandmother     Lung cancer Paternal Grandfather     Cancer Family     Coronary artery disease Family     Diabetes Family         sibling    Hypertension Family         sibling    Hernia Family         sibling       The following portions of the patient's history were reviewed and updated as appropriate: allergies, current medications, past family history, past medical history, past social history, past surgical history and problem list     Visit Vitals  Wt 95 7 kg (211 lb)   BMI 32 08 kg/m²   Smoking Status Never Smoker   BSA 2 09 m²      Wt Readings from Last 6 Encounters:   09/26/22 95 7 kg (211 lb)   09/21/22 97 1 kg (214 lb)   09/21/22 98 2 kg (216 lb 9 6 oz)   09/14/22 98 2 kg (216 lb 6 4 oz)   08/30/22 101 kg (222 lb 3 2 oz)   08/19/22 103 kg (226 lb)        Mental Status Exam:  Appearance:  alert, intermittent eye contact, appears stated age, casually dressed, marginal grooming/hygiene and overweight   Behavior:  calm, limited cooperativity and walking around room rearranging decor and chair at various times   Motor: restless and fidgety, normal gait and balance and left arm tremor, pacing room at times   Speech:  spontaneous, coherent and stuttering throughout   Mood:  anxious   Affect:  constricted and anxious   Thought Process:  Perseverative on medication Ativan and timing in for every 8 hours   Thought Content: no verbalized delusions or overt paranoia   Perceptual disturbances: no reported hallucinations and does not appear to be responding to internal stimuli at this time   Risk Potential: No active or passive suicidal or homicidal ideation was verbalized during interview   Cognition: oriented to self and situation, appears to be of average intelligence and cognition not formally tested   Insight:  Fair Judgment: Fair     Meds/Allergies    Allergies   Allergen Reactions    Losartan Angioedema    Aspirin Fatigue     Due to blood thinners      Bactrim [Sulfamethoxazole-Trimethoprim]     Eliquis [Apixaban] Other (See Comments)     Failed  Had embolic CVA    Lisinopril      Felt bad, was OK with Zestril brand name   Tramadol      Current Outpatient Medications   Medication Instructions    acetaminophen (TYLENOL) 500 mg, Oral, Every 6 hours PRN, 2 tab     amoxicillin (AMOXIL) 500 mg capsule No dose, route, or frequency recorded   atorvastatin (LIPITOR) 40 mg, Oral, Daily with dinner    carvedilol (COREG) 12 5 mg tablet TAKE 1 TABLET BY MOUTH TWICE A DAY WITH FOOD    famotidine (PEPCID) 40 mg, Oral, 2 times daily    furosemide (LASIX) 20 mg tablet TAKE 1 TABLET BY MOUTH EVERY DAY    Klor-Con M20 20 MEQ tablet TAKE 1 TABLET BY MOUTH EVERY DAY    LORazepam (ATIVAN) 0 5 mg, Oral, Every 8 hours PRN    mirtazapine (REMERON) 22 5 mg, Oral, Daily at bedtime    pantoprazole (PROTONIX) 40 mg tablet TAKE 1 TABLET BY MOUTH EVERY DAY    sertraline (ZOLOFT) 25 mg, Oral, Daily    warfarin (COUMADIN) 3 mg tablet Do not fill continue to take as prior plan    warfarin (COUMADIN) 4 mg tablet TAKE 1 TABLET BY MOUTH EVERY DAY    warfarin (COUMADIN) 5 mg tablet CONTINUE TO TAKE AS PRIOR DO NOT FILL        Labs & Imaging:  I have personally reviewed all pertinent laboratory tests and imaging results     Admission on 09/21/2022, Discharged on 09/22/2022   Component Date Value Ref Range Status    WBC 09/21/2022 6 61  4 31 - 10 16 Thousand/uL Final    RBC 09/21/2022 4 69  3 88 - 5 62 Million/uL Final    Hemoglobin 09/21/2022 14 3  12 0 - 17 0 g/dL Final    Hematocrit 09/21/2022 41 3  36 5 - 49 3 % Final    MCV 09/21/2022 88  82 - 98 fL Final    MCH 09/21/2022 30 5  26 8 - 34 3 pg Final    MCHC 09/21/2022 34 6  31 4 - 37 4 g/dL Final    RDW 09/21/2022 14 0  11 6 - 15 1 % Final    MPV 09/21/2022 10 5  8 9 - 12 7 fL Final    Platelets 55/14/9891 166  149 - 390 Thousands/uL Final    nRBC 09/21/2022 0  /100 WBCs Final    Neutrophils Relative 09/21/2022 74  43 - 75 % Final    Immat GRANS % 09/21/2022 0  0 - 2 % Final    Lymphocytes Relative 09/21/2022 16  14 - 44 % Final    Monocytes Relative 09/21/2022 9  4 - 12 % Final    Eosinophils Relative 09/21/2022 1  0 - 6 % Final    Basophils Relative 09/21/2022 0  0 - 1 % Final    Neutrophils Absolute 09/21/2022 4 83  1 85 - 7 62 Thousands/µL Final    Immature Grans Absolute 09/21/2022 0 01  0 00 - 0 20 Thousand/uL Final    Lymphocytes Absolute 09/21/2022 1 07  0 60 - 4 47 Thousands/µL Final    Monocytes Absolute 09/21/2022 0 61  0 17 - 1 22 Thousand/µL Final    Eosinophils Absolute 09/21/2022 0 07  0 00 - 0 61 Thousand/µL Final    Basophils Absolute 09/21/2022 0 02  0 00 - 0 10 Thousands/µL Final    Sodium 09/21/2022 135  135 - 147 mmol/L Final    Potassium 09/21/2022 3 4 (A) 3 5 - 5 3 mmol/L Final    Chloride 09/21/2022 101  96 - 108 mmol/L Final    CO2 09/21/2022 27  21 - 32 mmol/L Final    ANION GAP 09/21/2022 7  4 - 13 mmol/L Final    BUN 09/21/2022 6  5 - 25 mg/dL Final    Creatinine 09/21/2022 1 03  0 60 - 1 30 mg/dL Final    Standardized to IDMS reference method    Glucose 09/21/2022 100  65 - 140 mg/dL Final    If the patient is fasting, the ADA then defines impaired fasting glucose as > 100 mg/dL and diabetes as > or equal to 123 mg/dL  Specimen collection should occur prior to Sulfasalazine administration due to the potential for falsely depressed results  Specimen collection should occur prior to Sulfapyridine administration due to the potential for falsely elevated results   Calcium 09/21/2022 9 1  8 3 - 10 1 mg/dL Final    AST 09/21/2022 22  5 - 45 U/L Final    Specimen collection should occur prior to Sulfasalazine administration due to the potential for falsely depressed results       ALT 09/21/2022 31  12 - 78 U/L Final    Specimen collection should occur prior to Sulfasalazine and/or Sulfapyridine administration due to the potential for falsely depressed results   Alkaline Phosphatase 09/21/2022 100  46 - 116 U/L Final    Total Protein 09/21/2022 6 7  6 4 - 8 4 g/dL Final    Albumin 09/21/2022 3 6  3 5 - 5 0 g/dL Final    Total Bilirubin 09/21/2022 2 84 (A) 0 20 - 1 00 mg/dL Final    Use of this assay is not recommended for patients undergoing treatment with eltrombopag due to the potential for falsely elevated results   eGFR 09/21/2022 77  ml/min/1 73sq m Final    hs TnI 0hr 09/21/2022 12  "Refer to ACS Flowchart"- see link ng/L Final    Comment:                                              Initial (time 0) result  If >=50 ng/L, Myocardial injury suggested ;  Type of myocardial injury and treatment strategy  to be determined  If 5-49 ng/L, a delta result at 2 hours and or 4 hours will be needed to further evaluate  If <4 ng/L, and chest pain has been >3 hours since onset, patient may qualify for discharge based on the HEART score in the ED  If <5 ng/L and <3hours since onset of chest pain, a delta result at 2 hours will be needed to further evaluate  HS Troponin 99th Percentile URL of a Health Population=12 ng/L with a 95% Confidence Interval of 8-18 ng/L  Second Troponin (time 2 hours)  If calculated delta >= 20 ng/L,  Myocardial injury suggested ; Type of myocardial injury and treatment strategy to be determined  If 5-49 ng/L and the calculated delta is 5-19 ng/L, consult medical service for evaluation  Continue evaluation for ischemia on ecg and other possible etiology and repeat hs troponin at 4 hours  If delta                            is <5 ng/L at 2 hours, consider discharge based on risk stratification via the HEART score (if in ED), or MIGUELITO risk score in IP/Observation  HS Troponin 99th Percentile URL of a Health Population=12 ng/L with a 95% Confidence Interval of 8-18 ng/L      Protime 09/21/2022 32 3 (A) 11 6 - 14 5 seconds Final    INR 09/21/2022 3 12 (A) 0 84 - 1 19 Final    SARS-CoV-2 09/21/2022 Negative  Negative Final         INFLUENZA A PCR 09/21/2022 Negative  Negative Final         INFLUENZA B PCR 09/21/2022 Negative  Negative Final         RSV PCR 09/21/2022 Negative  Negative Final         EXTBreath Alcohol 09/21/2022 0 000   Final    Amph/Meth UR 09/21/2022 Negative  Negative Final    Barbiturate Ur 09/21/2022 Negative  Negative Final    Benzodiazepine Urine 09/21/2022 Negative  Negative Final    Cocaine Urine 09/21/2022 Negative  Negative Final    Methadone Urine 09/21/2022 Negative  Negative Final    Opiate Urine 09/21/2022 Negative  Negative Final    PCP Ur 09/21/2022 Negative  Negative Final    THC Urine 09/21/2022 Negative  Negative Final    Oxycodone Urine 09/21/2022 Negative  Negative Final    Color, UA 09/21/2022 Light Yellow   Final    Clarity, UA 09/21/2022 Clear   Final    Specific Gravity, UA 09/21/2022 1 017  1 003 - 1 030 Final    pH, UA 09/21/2022 5 5  4 5, 5 0, 5 5, 6 0, 6 5, 7 0, 7 5, 8 0 Final    Leukocytes, UA 09/21/2022 Negative  Negative Final    Nitrite, UA 09/21/2022 Negative  Negative Final    Protein, UA 09/21/2022 Negative  Negative mg/dl Final    Glucose, UA 09/21/2022 Negative  Negative mg/dl Final    Ketones, UA 09/21/2022 10 (1+) (A) Negative mg/dl Final    Urobilinogen, UA 09/21/2022 <2 0  <2 0 mg/dl mg/dl Final    Bilirubin, UA 09/21/2022 Negative  Negative Final    Occult Blood, UA 09/21/2022 Negative  Negative Final    hs TnI 2hr 09/21/2022 12  "Refer to ACS Flowchart"- see link ng/L Final    Comment:                                              Initial (time 0) result  If >=50 ng/L, Myocardial injury suggested ;  Type of myocardial injury and treatment strategy  to be determined  If 5-49 ng/L, a delta result at 2 hours and or 4 hours will be needed to further evaluate    If <4 ng/L, and chest pain has been >3 hours since onset, patient may qualify for discharge based on the HEART score in the ED  If <5 ng/L and <3hours since onset of chest pain, a delta result at 2 hours will be needed to further evaluate  HS Troponin 99th Percentile URL of a Health Population=12 ng/L with a 95% Confidence Interval of 8-18 ng/L  Second Troponin (time 2 hours)  If calculated delta >= 20 ng/L,  Myocardial injury suggested ; Type of myocardial injury and treatment strategy to be determined  If 5-49 ng/L and the calculated delta is 5-19 ng/L, consult medical service for evaluation  Continue evaluation for ischemia on ecg and other possible etiology and repeat hs troponin at 4 hours  If delta                            is <5 ng/L at 2 hours, consider discharge based on risk stratification via the HEART score (if in ED), or MIGUELITO risk score in IP/Observation  HS Troponin 99th Percentile URL of a Health Population=12 ng/L with a 95% Confidence Interval of 8-18 ng/L   Delta 2hr hsTnI 09/21/2022 0  <20 ng/L Final    Ventricular Rate 09/21/2022 70  BPM Final    Atrial Rate 09/21/2022 70  BPM Final    OR Interval 09/21/2022 148  ms Final    QRSD Interval 09/21/2022 82  ms Final    QT Interval 09/21/2022 380  ms Final    QTC Interval 09/21/2022 410  ms Final    P Axis 09/21/2022 51  degrees Final    QRS Axis 09/21/2022 16  degrees Final    T Wave Axis 09/21/2022 -2  degrees Final   Ancillary Orders on 09/09/2022   Component Date Value Ref Range Status    Protime 09/12/2022 24 4 (A) 11 6 - 14 5 seconds Final    INR 09/12/2022 2 21 (A) 0 84 - 1 19 Final   Appointment on 09/02/2022   Component Date Value Ref Range Status    TSH 3RD GENERATON 09/02/2022 0 192 (A) 0 450 - 4 500 uIU/mL Final    Adult TSH (3rd generation) reference range follows the recommended guidelines of the American Thyroid Association, January, 2020      Free T4 09/02/2022 1 48 (A) 0 76 - 1 46 ng/dL Final    Specimen collection should occur prior to Sulfasalazine administration due to the potential for falsely elevated results  Ancillary Orders on 09/02/2022   Component Date Value Ref Range Status    Protime 09/02/2022 27 4 (A) 11 6 - 14 5 seconds Final    INR 09/02/2022 2 56 (A) 0 84 - 1 19 Final   Ancillary Orders on 08/26/2022   Component Date Value Ref Range Status    Protime 08/30/2022 25 5 (A) 11 6 - 14 5 seconds Final    INR 08/30/2022 2 34 (A) 0 84 - 1 19 Final   Anticoag visit on 08/23/2022   Component Date Value Ref Range Status    INR 09/12/2022 2 20 (A) 0 84 - 1 19 Final   Appointment on 08/23/2022   Component Date Value Ref Range Status    Sodium 08/23/2022 139  135 - 147 mmol/L Final    Potassium 08/23/2022 3 8  3 5 - 5 3 mmol/L Final    Chloride 08/23/2022 101  96 - 108 mmol/L Final    CO2 08/23/2022 29  21 - 32 mmol/L Final    ANION GAP 08/23/2022 9  4 - 13 mmol/L Final    BUN 08/23/2022 8  5 - 25 mg/dL Final    Creatinine 08/23/2022 1 16  0 60 - 1 30 mg/dL Final    Standardized to IDMS reference method    Glucose 08/23/2022 112  65 - 140 mg/dL Final    If the patient is fasting, the ADA then defines impaired fasting glucose as > 100 mg/dL and diabetes as > or equal to 123 mg/dL  Specimen collection should occur prior to Sulfasalazine administration due to the potential for falsely depressed results  Specimen collection should occur prior to Sulfapyridine administration due to the potential for falsely elevated results   Calcium 08/23/2022 9 0  8 3 - 10 1 mg/dL Final    AST 08/23/2022 22  5 - 45 U/L Final    Specimen collection should occur prior to Sulfasalazine administration due to the potential for falsely depressed results   ALT 08/23/2022 34  12 - 78 U/L Final    Specimen collection should occur prior to Sulfasalazine administration due to the potential for falsely depressed results       Alkaline Phosphatase 08/23/2022 125 (A) 46 - 116 U/L Final    Total Protein 08/23/2022 7 1  6 4 - 8 4 g/dL Final    Albumin 08/23/2022 3 7  3 5 - 5 0 g/dL Final  Total Bilirubin 08/23/2022 2 40 (A) 0 20 - 1 00 mg/dL Final    Use of this assay is not recommended for patients undergoing treatment with eltrombopag due to the potential for falsely elevated results   eGFR 08/23/2022 67  ml/min/1 73sq m Final    Cholesterol 08/23/2022 89  See Comment mg/dL Final    Cholesterol:         Pediatric <18 Years        Desirable          <170 mg/dL      Borderline High    170-199 mg/dL      High               >=200 mg/dL        Adult >=18 Years            Desirable         <200 mg/dL      Borderline High   200-239 mg/dL      High              >239 mg/dL      Triglycerides 08/23/2022 78  See Comment mg/dL Final    Triglyceride:     0-9Y            <75mg/dL     10Y-17Y         <90 mg/dL       >=18Y     Normal          <150 mg/dL     Borderline High 150-199 mg/dL     High            200-499 mg/dL        Very High       >499 mg/dL    Specimen collection should occur prior to N-Acetylcysteine or Metamizole administration due to the potential for falsely depressed results   HDL, Direct 08/23/2022 35 (A) >=40 mg/dL Final    Specimen collection should occur prior to Metamizole administration due to the potential for falsley depressed results   LDL Calculated 08/23/2022 38  0 - 100 mg/dL Final    LDL Cholesterol:     Optimal           <100 mg/dl     Near Optimal      100-129 mg/dl     Above Optimal       Borderline High 130-159 mg/dl       High            160-189 mg/dl       Very High       >189 mg/dl         This screening LDL is a calculated result  It does not have the accuracy of the Direct Measured LDL in the monitoring of patients with hyperlipidemia and/or statin therapy  Direct Measure LDL (SLM098) must be ordered separately in these patients      Non-HDL-Chol (CHOL-HDL) 08/23/2022 54  mg/dl Final    Vit D, 25-Hydroxy 08/23/2022 34 6  30 0 - 100 0 ng/mL Final   Ancillary Orders on 08/19/2022   Component Date Value Ref Range Status    Protime 08/23/2022 33 3 (A) 11 6 - 14 5 seconds Final    INR 2022 3 30 (A) 0 84 - 1 19 Final   Anticoag visit on 08/15/2022   Component Date Value Ref Range Status    INR 08/15/2022 3 11 (A) 0 84 - 1 19 Final   Appointment on 08/15/2022   Component Date Value Ref Range Status    Potassium 08/15/2022 3 4 (A) 3 5 - 5 3 mmol/L Final   There may be more visits with results that are not included  ---------------------------------------------------    Rating Scales    Current PHQ-9   PHQ-2/9 Depression Screening    Little interest or pleasure in doing things: 3 - nearly every day  Feeling down, depressed, or hopeless: 3 - nearly every day  Trouble falling or staying asleep, or sleeping too much: 3 - nearly every day  Feeling tired or having little energy: 3 - nearly every day  Poor appetite or overeatin - several days  Feeling bad about yourself - or that you are a failure or have let yourself or your family down: 1 - several days  Trouble concentrating on things, such as reading the newspaper or watching television: 3 - nearly every day  Moving or speaking so slowly that other people could have noticed  Or the opposite - being so fidgety or restless that you have been moving around a lot more than usual: 3 - nearly every day  Thoughts that you would be better off dead, or of hurting yourself in some way: 1 - several days  PHQ-9 Score: 21   PHQ-9 Interpretation: Severe depression        Current SHIMON-7 is   SHIMON-7 Flowsheet Screening    Flowsheet Row Most Recent Value   Over the last 2 weeks, how often have you been bothered by any of the following problems? Feeling nervous, anxious, or on edge 3   Not being able to stop or control worrying 3   Worrying too much about different things 3   Trouble relaxing 3   Being so restless that it is hard to sit still 3   Becoming easily annoyed or irritable 3   Feeling afraid as if something awful might happen 1   SHIMON-7 Total Score 19            Psychiatric History:   Prior psychiatric diagnoses: depression and anxiety   Inpatient hospitalizations: once 2014 when attending said he will get aortic surgery, he states that he was brought inpatient psychiatry when the hospital that diagnosed him with aortic injury had planned a surgery for several weeks later and brought him in because they thought he would not follow up/possibly hurt himself prior to surgery  Suicide attempts/self-harm: patient denies  Violent/aggressive behavior: patient denies  Outpatient psychiatric providers: John 2015 after stroke  Past/current psychotherapy: One therapist, in past forgets name  Other Services: unknown  Psychiatric medication trial:    Unsure of others, dissolvable, klonopin   lorazepam and mirtazapine (still taking both)  Substance Abuse History:  Patient denies use of tobacco, alcohol, or illicit drugs  I have assessed this patient for substance use within the past 12 months  Family Psychiatric History:   Anxiety and depression in 3 sister  Sister attempted to end life  Social History  Family: 3 sisters, no children  Marital history:    Children: no  Living arrangement: Lives in a home with sister  Support system: alone & isolated , but has 3 sisters  Education: some college studied photography  Learning/developmental Disabilities: none  Occupational History: Sold and delivered office supplies, then [employer], retired in 2014 because needed surgery for aortic aneurism  On disability    Legal History: none  Violence: no   History: None  Access to firearms: patient denies      Traumatic History:   Abuse: none reported  Other Traumatic Events: none reported    -----------------------------------    Assessment/Plan   Rocky Ly is a 58 y o  male,  and presently living sister; unemployed collecting disability; with prior psychiatric diagnoses of major depressive disorder, severe, without psychotic features, generalized anxiety disorder, panic disorder without agoraphobia, and insomnia, unspecified; with suicide risk factors including chronic mental illness, medical problems, financial problems, limited social/emotional support, anxiety, gender (male), age (52+) and /; and medical history including stroke x2 (2014, last march 2019), a  fib (on warfarin) with residual tremor (left upper extremity), HTN, HLD, CORIE, ataxia, lower back pain, dizziness, h/o aortic aneurysm repair (2014); presenting today (09/26/22) with a main concern of generalized anxiety disorder  This generalized anxiety is associated with severe insomnia, he explains he is sleeping 4 hours a night  He also screen positive for major depressive disorder, which will be addressed with Zoloft  He was perseverative on medications throughout interview, explaining that he does not miss the medication, but it is often difficult to initiate new medications because organization is sometimes difficult  He feels he will be able to handle any increased dosage of mirtazapine and initiate Zoloft moving forward  He is agreeable to addressing his sleep by increasing mirtazapine to 22 5 mg daily, starting Zoloft 25 mg daily for anxiety, depression, and insomnia, and continuing his current dosage of Ativan 0 5 mg every 8 hours when needed  He is also agreeable to initiating melatonin at 3 mg daily and if needed will increase further in the future  He plans to have a follow-up visit to reassess in several weeks and would not like his Ativan increased at this point  He denies SI, HI, AVH and is agreeable to the plan  He contracts for safety, states he feels safe at home and is able to take care of himself, and is agreeable to the medication changes    Moving forward, he is agreeable to possibly initiating psychotherapy, which he has done in the past   Moving forward, if patient is not much improved at follow-up visit consider partial hospitalization program or if continued worsening of symptoms consider inpatient psychiatry  1  Generalized anxiety disorder   Increase mirtazapine to 22 5 mg daily   Continue Ativan 0 5 mg q 8 hours p r n    Initiate Zoloft 25 mg daily  o Consider increasing it follow-up visit if tolerating  o EKG 09/21/2022: Normal sinus rhythm, QTC of 410    2  Panic disorder without agoraphobia   Increase mirtazapine to 22 5 mg daily   Continue Ativan 0 5 mg q 8 hours p r n    Initiate Zoloft 25 mg daily    3  Major depressive disorder, severe, without psychotic features   Increase mirtazapine to 22 5 mg daily   Initiate Zoloft 25 mg daily    4  Insomnia, unspecified   Increase mirtazapine to 22 5 mg daily   Continue Ativan 0 5 mg q 8 hours p r n    Initiate Zoloft 25 mg daily   Initiate melatonin 3 mg daily over-the-counter every night for insomnia with plan to increase in the future based on efficacy but   Recommend follow-up with sleep specialist for CORIE diagnosis and sleep study moving forward; patient will discuss with PCP at his follow-up visit        Medications Risks/Benefits:      Risks, Benefits And Possible Side Effects Of Medications:    Risks, benefits, and possible side effects of medications explained to Tarsha and she verbalizes understanding and agreement for treatment  including:   · PARQ was completed for the class of SSRIs including transient anxiety, insomnia or sedation, headaches, constipation or diarrhea; and longer-standing sexual side effects, bleeding risk (especially with NSAIDs), and weight gain; as well as suicidal thoughts in patients under 22years old, serotonin syndrome, and induction of jasson  Potential for discontinuation syndrome (flu-like symptoms, insomnia, nausea, sensory disturbances, and headache) was also discussed      · Risks of taking benzodiazepines were discussed, including risk of sedation, respiratory depression, and impairment in judgment and motor function, particularly when combined with alcohol, opioids, or other central nervous system-depressant medications/substances  Depression, mood changes, GI distress, addiction potential, withdrawal seizures with abrupt discontinuation, and other potential adverse effects were also discussed  The patient was instructed not to drive or operate machinery while taking benzodiazepines  Pregnancy risks, including risk of fetal cleft palate, were also discussed with patients of childbearing potential   · PARQ was completed for mirtazapine (Remeron) including sedation, weight gain, suicidal thoughts in patients under 22years old, and rare hyponatremia or agranulocytosis  Treatment Plan:  The Treatment Plan was completed but not signed due to social distancing  Verbal consent was provided by the patient/caregiver during the visit    If done today, the next plan will be due March 25, 2023  The following interventions are recommended: return in 0 5-1 months for follow up or sooner if needed  Although patient's acute lethality risk is LOW, long-term/chronic lethality risk is mildly elevated given the suicide risk factors noted above  However, at the current moment, Vale Treadwell is future-oriented, forward-thinking, and demonstrates ability to act in a self-preserving manner as evidenced by volitionally seeking psychiatric evaluation and treatment today  To mitigate future risk, patient should adhere to treatment recommendations, avoid alcohol/illicit substance use, utilize community-based resources and familiar support, and prioritize mental health treatment  Medical Decision Making / Counseling / Coordination of Care:  Recent stressors were discussed with the patient  The diagnosis and treatment plan were reviewed with the patient  Risks, benefits, and alternativies to treatment were discussed, including a myriad of potential adverse medication side effects, to which Vale Treadwell voiced understanding and consented fully to treatment   The importance of medication and treatment compliance was reviewed with the patient  Individual psychotherapy provided: Supportive psychotherapy         Carmenza Blackburn MD    This note was not shared with the patient due to reasonable likelihood of causing patient harm

## 2022-09-28 ENCOUNTER — TELEPHONE (OUTPATIENT)
Dept: FAMILY MEDICINE CLINIC | Facility: CLINIC | Age: 62
End: 2022-09-28

## 2022-09-28 ENCOUNTER — HOSPITAL ENCOUNTER (INPATIENT)
Facility: HOSPITAL | Age: 62
LOS: 27 days | Discharge: HOME | DRG: 880 | End: 2022-10-26
Attending: EMERGENCY MEDICINE | Admitting: PSYCHIATRY & NEUROLOGY
Payer: COMMERCIAL

## 2022-09-28 ENCOUNTER — OFFICE VISIT (OUTPATIENT)
Dept: FAMILY MEDICINE CLINIC | Facility: CLINIC | Age: 62
End: 2022-09-28
Payer: COMMERCIAL

## 2022-09-28 ENCOUNTER — TELEPHONE (OUTPATIENT)
Dept: PSYCHIATRY | Facility: CLINIC | Age: 62
End: 2022-09-28

## 2022-09-28 VITALS
BODY MASS INDEX: 31.98 KG/M2 | HEIGHT: 68 IN | DIASTOLIC BLOOD PRESSURE: 88 MMHG | SYSTOLIC BLOOD PRESSURE: 144 MMHG | OXYGEN SATURATION: 97 % | HEART RATE: 80 BPM | WEIGHT: 211 LBS

## 2022-09-28 DIAGNOSIS — R07.9 CHEST PAIN: ICD-10-CM

## 2022-09-28 DIAGNOSIS — K21.9 GERD (GASTROESOPHAGEAL REFLUX DISEASE): ICD-10-CM

## 2022-09-28 DIAGNOSIS — M54.50 CHRONIC RIGHT-SIDED LOW BACK PAIN WITHOUT SCIATICA: ICD-10-CM

## 2022-09-28 DIAGNOSIS — F41.1 GAD (GENERALIZED ANXIETY DISORDER): ICD-10-CM

## 2022-09-28 DIAGNOSIS — F41.9 ANXIETY: ICD-10-CM

## 2022-09-28 DIAGNOSIS — E87.1 HYPONATREMIA: ICD-10-CM

## 2022-09-28 DIAGNOSIS — R42 DIZZINESS: ICD-10-CM

## 2022-09-28 DIAGNOSIS — I49.8 SINUS ARRHYTHMIA: ICD-10-CM

## 2022-09-28 DIAGNOSIS — I10 PRIMARY HYPERTENSION: ICD-10-CM

## 2022-09-28 DIAGNOSIS — E22.2 SIADH (SYNDROME OF INAPPROPRIATE ADH PRODUCTION) (HCC): Primary | ICD-10-CM

## 2022-09-28 DIAGNOSIS — I63.9 CEREBROVASCULAR ACCIDENT (CVA), UNSPECIFIED MECHANISM (HCC): ICD-10-CM

## 2022-09-28 DIAGNOSIS — F41.9 ANXIETY DISORDER: ICD-10-CM

## 2022-09-28 DIAGNOSIS — F41.1 GAD (GENERALIZED ANXIETY DISORDER): Primary | ICD-10-CM

## 2022-09-28 DIAGNOSIS — I48.0 PAROXYSMAL ATRIAL FIBRILLATION (HCC): ICD-10-CM

## 2022-09-28 DIAGNOSIS — G89.29 CHRONIC RIGHT-SIDED LOW BACK PAIN WITHOUT SCIATICA: ICD-10-CM

## 2022-09-28 DIAGNOSIS — Z79.01 ANTICOAGULATED ON COUMADIN: ICD-10-CM

## 2022-09-28 DIAGNOSIS — F32.A DEPRESSION: ICD-10-CM

## 2022-09-28 DIAGNOSIS — K59.00 CONSTIPATION, UNSPECIFIED CONSTIPATION TYPE: ICD-10-CM

## 2022-09-28 DIAGNOSIS — I15.9 SECONDARY HYPERTENSION: ICD-10-CM

## 2022-09-28 DIAGNOSIS — F41.0 PANIC ATTACK: ICD-10-CM

## 2022-09-28 LAB
AMPHETAMINES SERPL QL SCN: NEGATIVE
ANION GAP SERPL CALCULATED.3IONS-SCNC: 6 MMOL/L (ref 5–14)
BARBITURATES UR QL: NEGATIVE
BASOPHILS # BLD AUTO: 0.02 THOUSANDS/ÂΜL (ref 0–0.1)
BASOPHILS NFR BLD AUTO: 0 % (ref 0–1)
BENZODIAZ UR QL: NEGATIVE
BILIRUB UR QL STRIP: NEGATIVE
BUN SERPL-MCNC: 8 MG/DL (ref 5–25)
CALCIUM SERPL-MCNC: 8.8 MG/DL (ref 8.4–10.2)
CHLORIDE SERPL-SCNC: 97 MMOL/L (ref 96–108)
CLARITY UR: CLEAR
CO2 SERPL-SCNC: 27 MMOL/L (ref 21–32)
COCAINE UR QL: NEGATIVE
COLOR UR: ABNORMAL
CREAT SERPL-MCNC: 0.94 MG/DL (ref 0.7–1.5)
EOSINOPHIL # BLD AUTO: 0.05 THOUSAND/ÂΜL (ref 0–0.61)
EOSINOPHIL NFR BLD AUTO: 1 % (ref 0–6)
ERYTHROCYTE [DISTWIDTH] IN BLOOD BY AUTOMATED COUNT: 14.1 % (ref 11.6–15.1)
ETHANOL EXG-MCNC: 0 MG/DL
FLUAV RNA RESP QL NAA+PROBE: NEGATIVE
FLUBV RNA RESP QL NAA+PROBE: NEGATIVE
GFR SERPL CREATININE-BSD FRML MDRD: 86 ML/MIN/1.73SQ M
GLUCOSE SERPL-MCNC: 120 MG/DL (ref 70–99)
GLUCOSE UR STRIP-MCNC: NEGATIVE MG/DL
HCT VFR BLD AUTO: 41 % (ref 36.5–49.3)
HGB BLD-MCNC: 14 G/DL (ref 12–17)
HGB UR QL STRIP.AUTO: NEGATIVE
IMM GRANULOCYTES # BLD AUTO: 0.01 THOUSAND/UL (ref 0–0.2)
IMM GRANULOCYTES NFR BLD AUTO: 0 % (ref 0–2)
INR PPP: 3.14 (ref 0.84–1.19)
KETONES UR STRIP-MCNC: ABNORMAL MG/DL
LEUKOCYTE ESTERASE UR QL STRIP: NEGATIVE
LYMPHOCYTES # BLD AUTO: 0.74 THOUSANDS/ÂΜL (ref 0.6–4.47)
LYMPHOCYTES NFR BLD AUTO: 12 % (ref 14–44)
MCH RBC QN AUTO: 29.9 PG (ref 26.8–34.3)
MCHC RBC AUTO-ENTMCNC: 34.1 G/DL (ref 31.4–37.4)
MCV RBC AUTO: 87 FL (ref 82–98)
METHADONE UR QL: NEGATIVE
MONOCYTES # BLD AUTO: 0.7 THOUSAND/ÂΜL (ref 0.17–1.22)
MONOCYTES NFR BLD AUTO: 11 % (ref 4–12)
NEUTROPHILS # BLD AUTO: 4.84 THOUSANDS/ÂΜL (ref 1.85–7.62)
NEUTS SEG NFR BLD AUTO: 76 % (ref 43–75)
NITRITE UR QL STRIP: NEGATIVE
NRBC BLD AUTO-RTO: 0 /100 WBCS
OPIATES UR QL SCN: NEGATIVE
OXYCODONE+OXYMORPHONE UR QL SCN: NEGATIVE
PCP UR QL: NEGATIVE
PH UR STRIP.AUTO: 6 [PH]
PLATELET # BLD AUTO: 153 THOUSANDS/UL (ref 149–390)
PMV BLD AUTO: 9.6 FL (ref 8.9–12.7)
POTASSIUM SERPL-SCNC: 3.5 MMOL/L (ref 3.5–5.3)
PROT UR STRIP-MCNC: NEGATIVE MG/DL
PROTHROMBIN TIME: 32.8 SECONDS (ref 11.6–14.5)
RBC # BLD AUTO: 4.69 MILLION/UL (ref 3.88–5.62)
RSV RNA RESP QL NAA+PROBE: NEGATIVE
SARS-COV-2 RNA RESP QL NAA+PROBE: NEGATIVE
SODIUM SERPL-SCNC: 130 MMOL/L (ref 135–147)
SP GR UR STRIP.AUTO: 1.02 (ref 1–1.04)
THC UR QL: NEGATIVE
UROBILINOGEN UA: NEGATIVE MG/DL
WBC # BLD AUTO: 6.36 THOUSAND/UL (ref 4.31–10.16)

## 2022-09-28 PROCEDURE — 93005 ELECTROCARDIOGRAM TRACING: CPT

## 2022-09-28 PROCEDURE — 80307 DRUG TEST PRSMV CHEM ANLYZR: CPT | Performed by: EMERGENCY MEDICINE

## 2022-09-28 PROCEDURE — 85025 COMPLETE CBC W/AUTO DIFF WBC: CPT | Performed by: EMERGENCY MEDICINE

## 2022-09-28 PROCEDURE — 99284 EMERGENCY DEPT VISIT MOD MDM: CPT | Performed by: EMERGENCY MEDICINE

## 2022-09-28 PROCEDURE — 85610 PROTHROMBIN TIME: CPT | Performed by: EMERGENCY MEDICINE

## 2022-09-28 PROCEDURE — 80048 BASIC METABOLIC PNL TOTAL CA: CPT | Performed by: EMERGENCY MEDICINE

## 2022-09-28 PROCEDURE — 81003 URINALYSIS AUTO W/O SCOPE: CPT | Performed by: EMERGENCY MEDICINE

## 2022-09-28 PROCEDURE — 99285 EMERGENCY DEPT VISIT HI MDM: CPT

## 2022-09-28 PROCEDURE — 99215 OFFICE O/P EST HI 40 MIN: CPT | Performed by: FAMILY MEDICINE

## 2022-09-28 PROCEDURE — 82075 ASSAY OF BREATH ETHANOL: CPT | Performed by: EMERGENCY MEDICINE

## 2022-09-28 PROCEDURE — 36415 COLL VENOUS BLD VENIPUNCTURE: CPT | Performed by: EMERGENCY MEDICINE

## 2022-09-28 PROCEDURE — 0241U HB NFCT DS VIR RESP RNA 4 TRGT: CPT | Performed by: EMERGENCY MEDICINE

## 2022-09-28 RX ORDER — CARVEDILOL 12.5 MG/1
12.5 TABLET ORAL 2 TIMES DAILY WITH MEALS
Status: DISCONTINUED | OUTPATIENT
Start: 2022-09-29 | End: 2022-10-10

## 2022-09-28 RX ORDER — LORAZEPAM 0.5 MG/1
0.5 TABLET ORAL EVERY 8 HOURS PRN
Status: DISCONTINUED | OUTPATIENT
Start: 2022-09-28 | End: 2022-09-29

## 2022-09-28 RX ORDER — MIRTAZAPINE 15 MG/1
22.5 TABLET, FILM COATED ORAL
Qty: 45 TABLET | Refills: 2 | Status: CANCELLED | OUTPATIENT
Start: 2022-09-28

## 2022-09-28 RX ORDER — WARFARIN SODIUM 4 MG/1
4 TABLET ORAL
Status: DISCONTINUED | OUTPATIENT
Start: 2022-09-29 | End: 2022-09-29

## 2022-09-28 RX ORDER — LORAZEPAM 1 MG/1
2 TABLET ORAL ONCE
Status: COMPLETED | OUTPATIENT
Start: 2022-09-28 | End: 2022-09-28

## 2022-09-28 RX ORDER — MIRTAZAPINE 15 MG/1
22.5 TABLET, FILM COATED ORAL
Qty: 45 TABLET | Refills: 2 | Status: ON HOLD | OUTPATIENT
Start: 2022-09-28 | End: 2022-10-26 | Stop reason: SDUPTHER

## 2022-09-28 RX ORDER — FAMOTIDINE 20 MG/1
40 TABLET, FILM COATED ORAL DAILY
Status: DISCONTINUED | OUTPATIENT
Start: 2022-09-29 | End: 2022-09-29

## 2022-09-28 RX ORDER — RISPERIDONE 1 MG/1
2 TABLET, ORALLY DISINTEGRATING ORAL ONCE
Status: COMPLETED | OUTPATIENT
Start: 2022-09-28 | End: 2022-09-28

## 2022-09-28 RX ORDER — LORAZEPAM 0.5 MG/1
0.5 TABLET ORAL EVERY 8 HOURS PRN
Qty: 90 TABLET | Refills: 0 | Status: ON HOLD
Start: 2022-10-03 | End: 2022-10-26 | Stop reason: SDUPTHER

## 2022-09-28 RX ORDER — POTASSIUM CHLORIDE 20 MEQ/1
20 TABLET, EXTENDED RELEASE ORAL DAILY
Status: DISCONTINUED | OUTPATIENT
Start: 2022-09-29 | End: 2022-10-17

## 2022-09-28 RX ORDER — FUROSEMIDE 20 MG/1
20 TABLET ORAL DAILY
Status: DISCONTINUED | OUTPATIENT
Start: 2022-09-29 | End: 2022-10-13

## 2022-09-28 RX ORDER — ATORVASTATIN CALCIUM 40 MG/1
40 TABLET, FILM COATED ORAL
Status: DISCONTINUED | OUTPATIENT
Start: 2022-09-29 | End: 2022-10-26 | Stop reason: HOSPADM

## 2022-09-28 RX ADMIN — LORAZEPAM 2 MG: 1 TABLET ORAL at 20:15

## 2022-09-28 RX ADMIN — RISPERIDONE 2 MG: 1 TABLET, ORALLY DISINTEGRATING ORAL at 20:15

## 2022-09-28 RX ADMIN — MIRTAZAPINE 22.5 MG: 7.5 TABLET, FILM COATED ORAL at 22:35

## 2022-09-28 NOTE — TELEPHONE ENCOUNTER
At 4:30 p m  on 09/28/2022, reached out to patient's sister, Aurelia Ford, and the patient, Sarah Krishna, and discussed patient's mental state over the past few days since his psychiatry appointment on 9/26/22  He does not seem to be improving on medications, and continues to report severe anxiety, continual panic attacks, and a constant sense of impending doom to the point of not being able to sleep, poor appetite, stuttering through his sentences, pacing, confusion/decreaed concentration, irritability, and extreme worry about the future  Currently, he is in agreement to going to the hospital and signing himself in on a voluntary basis when recommendation was made on the phone with sister and patient present  He does not feel the need for an ambulance, and denies any SI, HI, or AVH  He feels safe, especially in the presence of his sister who will be taking him to the emergency department tonight  There are no safety concerns and patient contracts for safety  Given the extreme nature of Sage's anxiety, panic attacks, and sense of impending doom along with decreasing ability to care for self, that is worsening/not improving with the medication regimen, it is our recommendation that patient is admitted to inpatient psychiatry for further stabilization  Patient's sister states she will drive him to [address] [facility] tonight  [address] [facility] ED Crisis team was called at 5:30 p m  on 09/28/2022 to alert them of patient's arrival and recommendation for inpatient psychiatry  Kalpesh Vital MD  09/28/2022 at 5:38 p m

## 2022-09-28 NOTE — TELEPHONE ENCOUNTER
Patient was in PCP office asking for refills on Lorazepam given by Psych and he also asked for a full 30 day supply of Mirtazapine as he was only given 30 tablets but he takes 1 5 tablets per day  I did NOT add the medication to this encounter

## 2022-09-28 NOTE — ASSESSMENT & PLAN NOTE
Continues with severe anxiety and panic  I would still feel that inpatient treatment would be warranted, but defer to psychiatry  Psych just started on medications, and no changes yet  Would recommend close follow up with psych for this

## 2022-09-28 NOTE — PROGRESS NOTES
Assessment and Plan:    Problem List Items Addressed This Visit     SHIMON (generalized anxiety disorder) - Primary     Continues with severe anxiety and panic  I would still feel that inpatient treatment would be warranted, but defer to psychiatry  Psych just started on medications, and no changes yet  Would recommend close follow up with psych for this  Hypertension     BP is a bit elevated  Would recommend follow with OV in future  Low back pain     Patient was having some issues with low back pain  He was not able to discuss well due to his panic  Panic attack     Per SHIMON  Diagnoses and all orders for this visit:    SHIMON (generalized anxiety disorder)    Panic attack    Primary hypertension    Chronic right-sided low back pain without sciatica            Subjective:      Patient ID: David Dixon is a 58 y o  male  CC:    Chief Complaint   Patient presents with    Follow-up     Pt would like to know if you can refill the Mirtazapine as they only gave #30 which only lasts 15 days  kw    Anxiety     Follow up  Pt was seen by Psych and they would like to put him on medication that will make him not be allowed to drive  He states the medication is not working anyway  It's Q8 hours but only lasts 4 hours  kw       HPI:    Patient here to follow up on psych issues  Reviewed AVS  He is to take 1 5 tabs daily of remeron  Ativan: He was told to take 1 q 8 per psych  He was also asked to take melatonin at HS  He is still feeling quite anxious  Some flight, pressured speech  He also was asking for medications from here after seeing psychiatry  Still having increased problems with anxiety and panic  I reviewed about Psych sending in the medications            The following portions of the patient's history were reviewed and updated as appropriate: allergies, current medications, past family history, past medical history, past social history, past surgical history and problem list       Review of Systems   Unable to perform ROS: Mental status change         Data to review:       Objective:    Vitals:    09/28/22 1446   BP: 144/88   BP Location: Left arm   Patient Position: Sitting   Pulse: 80   SpO2: 97%   Weight: 95 7 kg (211 lb)   Height: 5' 8" (1 727 m)        Physical Exam  Vitals and nursing note reviewed  Constitutional:       Appearance: Normal appearance  Neurological:      Mental Status: He is alert  Psychiatric:         Attention and Perception: He is inattentive  Mood and Affect: Mood is anxious  Speech: Speech is rapid and pressured and tangential          Behavior: Behavior is agitated  Thought Content: Thought content is delusional  Thought content does not include suicidal ideation  Thought content does not include suicidal plan  Cognition and Memory: Cognition is impaired  Judgment: Judgment is inappropriate  I have spent 60 minutes with Patient and family today in which greater than 50% of this time was spent in counseling/coordination of care regarding Diagnostic results, Prognosis, Risks and benefits of tx options, Intructions for management, Patient and family education, Importance of tx compliance, Risk factor reductions and Impressions

## 2022-09-28 NOTE — ED NOTES
[place] PR[place] indicates:  Inactive  Primary payor is [facility] BC/ [employer]  Plan is a Medicare replacement

## 2022-09-28 NOTE — ASSESSMENT & PLAN NOTE
Patient was having some issues with low back pain  He was not able to discuss well due to his panic

## 2022-09-28 NOTE — ED PROCEDURE NOTE
PROCEDURE  ECG 12 Lead Documentation Only    Date/Time: 9/28/2022 6:50 PM  Performed by: Severo Rising, MD  Authorized by: Severo Rising, MD     Indications / Diagnosis:   Mh clearance  ECG reviewed by me, the ED Provider: yes    Patient location:  ED  Interpretation:     Interpretation: normal    Quality:     Tracing quality:  Limited by artifact (best of many, patient with tremors)  Rate:     ECG rate:  82    ECG rate assessment: normal    Rhythm:     Rhythm: sinus rhythm    Ectopy:     Ectopy: none    QRS:     QRS axis:  Normal    QRS intervals:  Normal  Conduction:     Conduction: normal    ST segments:     ST segments:  Normal  T waves:     T waves: normal           Severo Rising, MD  09/28/22 1914

## 2022-09-28 NOTE — PATIENT INSTRUCTIONS
Psychiatry: [facility]age office at [phone] or [facility] office at [phone]   Problem List Items Addressed This Visit       SHIMON (generalized anxiety disorder) - Primary     Continues with severe anxiety and panic  I would still feel that inpatient treatment would be warranted, but defer to psychiatry  Psych just started on medications, and no changes yet  Would recommend close follow up with psych for this  Hypertension     BP is a bit elevated  Would recommend follow with OV in future  Low back pain     Patient was having some issues with low back pain  He was not able to discuss well due to his panic  Panic attack     Per SHIMON  COVID 19 Instructions    Hans Christina 22 was advised to limit contact with others to essential tasks such as getting food, medications, and medical care  Proper handwashing reviewed, and Hand sanitzer when washing is not available  If the patient develops symptoms of COVID 19, the patient should call the office as soon as possible  For [id number] Flu season, it is strongly recommended that Flu Vaccinations be obtained  Virtual Visits:  Cr: This works on smart phones (any phone with Internet browsing capability)  You should get a text message when the provider is ready to see you  Click on the link in the text message, and the call should start  You will need to type in your name, and allow camera and microphone access  This is HIPPA compliant, and secure  If you have not already done so, get immunized to COVID 19  We are committed to getting you vaccinated as soon as possible and will be closely following CDC and [facility]  guidelines as they are released and revised  Please refer to our COVID-19 vaccine webpage for the most up to date information on the vaccine and our distribution efforts      This site will also have the most up to date recommendations for COVID booster vaccine  [employer] tn    Call [phone] ([phone]), option 7    OUR NEW LOCATION:    [address], [facility] [address], [place], [address]  Fax: [phone]    Lab services and OB/GYN are at this location as well

## 2022-09-28 NOTE — TELEPHONE ENCOUNTER
Medication lorazepam 0 5 mg every 8 hours when needed will be prescribed, starting 20 days after last refill which was on 09/13/2022 (at this time patient received 20 day supply by PCP)  At this point, psychiatrist will take over feeling of lorazepam moving forward  Next refill on 08/03/2022 for 30 day supply of lorazepam 0 5 mg every 8 hours (90 tablet quantity)  Regarding mirtazapine, this medication will be refilled as well to allow him to take 1 5 tablets (22 5 mg) daily at bedtime  Attempted to call patient at 4:42 p m  on 09/28/2022  Was sent to [place], will re-attempt in the morning of 09/29/2022  Tabatha Wright MD  09/28/2020 at 4:43 p m

## 2022-09-28 NOTE — Clinical Note
Patient is severe problems with panic  He is not coping well  I asked that he contact your office about medication renewals, some FOI, pressured speech, thoughts of impending doom  Fixated on multiple issues  He needs to, I think, get medications from you, not from me

## 2022-09-29 LAB
ATRIAL RATE: 82 BPM
ATRIAL RATE: 91 BPM
ATRIAL RATE: 91 BPM
P AXIS: 37 DEGREES
P AXIS: 40 DEGREES
P AXIS: 51 DEGREES
PR INTERVAL: 154 MS
PR INTERVAL: 156 MS
PR INTERVAL: 158 MS
QRS AXIS: -1 DEGREES
QRS AXIS: -2 DEGREES
QRS AXIS: 3 DEGREES
QRSD INTERVAL: 82 MS
QT INTERVAL: 352 MS
QT INTERVAL: 364 MS
QT INTERVAL: 374 MS
QTC INTERVAL: 432 MS
QTC INTERVAL: 436 MS
QTC INTERVAL: 447 MS
T WAVE AXIS: -11 DEGREES
T WAVE AXIS: -15 DEGREES
T WAVE AXIS: -2 DEGREES
VENTRICULAR RATE: 82 BPM
VENTRICULAR RATE: 91 BPM
VENTRICULAR RATE: 91 BPM

## 2022-09-29 PROCEDURE — 93010 ELECTROCARDIOGRAM REPORT: CPT | Performed by: INTERNAL MEDICINE

## 2022-09-29 PROCEDURE — 93005 ELECTROCARDIOGRAM TRACING: CPT

## 2022-09-29 RX ORDER — HALOPERIDOL 5 MG/ML
5 INJECTION INTRAMUSCULAR
Status: DISCONTINUED | OUTPATIENT
Start: 2022-09-29 | End: 2022-10-26 | Stop reason: HOSPADM

## 2022-09-29 RX ORDER — TRAZODONE HYDROCHLORIDE 50 MG/1
50 TABLET ORAL
Status: DISCONTINUED | OUTPATIENT
Start: 2022-09-29 | End: 2022-10-05

## 2022-09-29 RX ORDER — BENZTROPINE MESYLATE 1 MG/ML
1 INJECTION INTRAMUSCULAR; INTRAVENOUS
Status: DISCONTINUED | OUTPATIENT
Start: 2022-09-29 | End: 2022-10-26 | Stop reason: HOSPADM

## 2022-09-29 RX ORDER — RISPERIDONE 0.5 MG/1
0.5 TABLET, FILM COATED ORAL
Status: DISCONTINUED | OUTPATIENT
Start: 2022-09-29 | End: 2022-10-26 | Stop reason: HOSPADM

## 2022-09-29 RX ORDER — RISPERIDONE 0.25 MG/1
0.25 TABLET, FILM COATED ORAL
Status: DISCONTINUED | OUTPATIENT
Start: 2022-09-29 | End: 2022-10-26 | Stop reason: HOSPADM

## 2022-09-29 RX ORDER — LORAZEPAM 2 MG/ML
1 INJECTION INTRAMUSCULAR
Status: DISCONTINUED | OUTPATIENT
Start: 2022-09-29 | End: 2022-10-26 | Stop reason: HOSPADM

## 2022-09-29 RX ORDER — ACETAMINOPHEN 325 MG/1
650 TABLET ORAL EVERY 4 HOURS PRN
Status: DISCONTINUED | OUTPATIENT
Start: 2022-09-29 | End: 2022-10-26 | Stop reason: HOSPADM

## 2022-09-29 RX ORDER — AMOXICILLIN 250 MG
1 CAPSULE ORAL DAILY PRN
Status: DISCONTINUED | OUTPATIENT
Start: 2022-09-29 | End: 2022-10-26 | Stop reason: HOSPADM

## 2022-09-29 RX ORDER — POLYETHYLENE GLYCOL 3350 17 G/17G
17 POWDER, FOR SOLUTION ORAL DAILY PRN
Status: DISCONTINUED | OUTPATIENT
Start: 2022-09-29 | End: 2022-10-26 | Stop reason: HOSPADM

## 2022-09-29 RX ORDER — WARFARIN SODIUM 2 MG/1
4 TABLET ORAL
Status: DISCONTINUED | OUTPATIENT
Start: 2022-09-30 | End: 2022-09-30

## 2022-09-29 RX ORDER — MAGNESIUM HYDROXIDE/ALUMINUM HYDROXICE/SIMETHICONE 120; 1200; 1200 MG/30ML; MG/30ML; MG/30ML
30 SUSPENSION ORAL EVERY 4 HOURS PRN
Status: DISCONTINUED | OUTPATIENT
Start: 2022-09-29 | End: 2022-10-26 | Stop reason: HOSPADM

## 2022-09-29 RX ORDER — BENZTROPINE MESYLATE 0.5 MG/1
0.5 TABLET ORAL
Status: DISCONTINUED | OUTPATIENT
Start: 2022-09-29 | End: 2022-10-26 | Stop reason: HOSPADM

## 2022-09-29 RX ORDER — HYDROXYZINE HYDROCHLORIDE 25 MG/1
25 TABLET, FILM COATED ORAL
Status: DISCONTINUED | OUTPATIENT
Start: 2022-09-29 | End: 2022-10-26 | Stop reason: HOSPADM

## 2022-09-29 RX ORDER — PROPRANOLOL HYDROCHLORIDE 10 MG/1
5 TABLET ORAL EVERY 8 HOURS PRN
Status: DISCONTINUED | OUTPATIENT
Start: 2022-09-29 | End: 2022-10-26 | Stop reason: HOSPADM

## 2022-09-29 RX ORDER — HYDROXYZINE 50 MG/1
50 TABLET, FILM COATED ORAL
Status: DISCONTINUED | OUTPATIENT
Start: 2022-09-29 | End: 2022-09-30

## 2022-09-29 RX ORDER — RISPERIDONE 1 MG/1
1 TABLET, FILM COATED ORAL
Status: DISCONTINUED | OUTPATIENT
Start: 2022-09-29 | End: 2022-10-26 | Stop reason: HOSPADM

## 2022-09-29 RX ORDER — LANOLIN ALCOHOL/MO/W.PET/CERES
3 CREAM (GRAM) TOPICAL
Status: DISCONTINUED | OUTPATIENT
Start: 2022-09-29 | End: 2022-10-26 | Stop reason: HOSPADM

## 2022-09-29 RX ORDER — ACETAMINOPHEN 325 MG/1
975 TABLET ORAL EVERY 6 HOURS PRN
Status: DISCONTINUED | OUTPATIENT
Start: 2022-09-29 | End: 2022-10-26 | Stop reason: HOSPADM

## 2022-09-29 RX ADMIN — CARVEDILOL 12.5 MG: 12.5 TABLET, FILM COATED ORAL at 08:43

## 2022-09-29 RX ADMIN — HYDROXYZINE HYDROCHLORIDE 50 MG: 50 TABLET, FILM COATED ORAL at 21:15

## 2022-09-29 RX ADMIN — MIRTAZAPINE 22.5 MG: 7.5 TABLET, FILM COATED ORAL at 21:02

## 2022-09-29 RX ADMIN — MELATONIN TAB 3 MG 3 MG: 3 TAB at 21:02

## 2022-09-29 RX ADMIN — ATORVASTATIN CALCIUM 40 MG: 40 TABLET, FILM COATED ORAL at 17:34

## 2022-09-29 RX ADMIN — FAMOTIDINE 40 MG: 20 TABLET ORAL at 08:39

## 2022-09-29 RX ADMIN — FUROSEMIDE 20 MG: 40 TABLET ORAL at 08:39

## 2022-09-29 RX ADMIN — POTASSIUM CHLORIDE 20 MEQ: 1500 TABLET, EXTENDED RELEASE ORAL at 08:38

## 2022-09-29 RX ADMIN — TRAZODONE HYDROCHLORIDE 50 MG: 50 TABLET ORAL at 22:26

## 2022-09-29 RX ADMIN — CARVEDILOL 12.5 MG: 12.5 TABLET, FILM COATED ORAL at 17:34

## 2022-09-29 NOTE — TELEPHONE ENCOUNTER
EMILEE Russ- returning call regarding medications  Encouraged to call nursing line and leave detailed message how we can help   Provided nursing number

## 2022-09-29 NOTE — ED NOTES
Patient presented to ED from direction of Psychiatrist  Patient has been decompensating recently, not caring for self, no appetite, unsafe to drive (sister took keys ) Patient was asleep during interview- information provided by sister and psychiatrist  Patient has been having panic attacks and severe anxiety where he cannot function with normal daily activity  Patient is denying any suicidal or homicidal ideation  patient is willing to sign a 201 for inpatient treatment  by Radha Tariq at 9/28/22 1360     Patient is a 57 y/o  M referred by Kole Ellis MD, [address] [place] [address] Psychiatry  The patient has a history of anxiety, panic attacks, insomnia and multiple medical comorbidities  Patient stated he has been experiencing debilitating panic attacks and anxiety  He related that he had a series of strokes: 2014, 2019 and 2021, in addition to cardiac issues, and believes his anxiety worsened due to these conditions  Patient stated he chronically worries, anxiously anticipating even day-to-day functions such as needing to use the bathroom  Patient continually questions his ability to follow through with normal tasks  He also fixates on schedules, time frames, medication reactions and medical conditions  He stated his sister recently took his car keys due to concern that he could not make rapid decisions required while driving  Arabella Helm He experiences tremors, reportedly as a result of his first stroke  Although he can maintain chronological order when explaining past events, his forward thinking seems unproductive, engaging in ruminative obsessing  He seems distressed by the content of the thoughts in that they are accompanied by severe anxiety; however, he does not seem to have insight into this process  In presentation, he is perseverative and stuttering  He reported that he has trouble going to sleep and maintaining sleep  He gets up and paces   When awake, he is immediately caught up in a series of anticipatory concerns  Patient stated he has had periods in the past when he would curl up in a ball and cry, as he had no ability to be goal-directed in thinking or tasks  With Valium, and his sister's literally taking him by the hand, he was gradually able to function  He stated there was a time when he would go several days of the week to the [facility] to meet with his group of friends  When the restaurant 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

## 2022-09-29 NOTE — DISCHARGE INSTR - APPOINTMENTS
Marely Espinosa or Adelaide, our Echo and Alison, will be calling you after your discharge, on the phone number that you provided  They will be available as an additional support, if needed  If you wish to speak with one of them, you may contact Kayla Yanez at [phone] or Richa Daily at [phone]         Emanuel Jefferson, Behavioral Health  from [facility]  P: [phone]

## 2022-09-29 NOTE — CASE MANAGEMENT
Psychosocial Assessment 1:1 completed with pt in dayroom  Anxious, soft spoken, somatic, focused on bowels, negative, cooperative, calm, alert  Admission / Details: 201 admission from [facility] ED for panic attacks and anxiety  [facility]   Commitment Status: 201  Insurance: [employer] [facility]  Rx coverage: Yes  Marital Status:   Children: None  Family: Mother living, father , 2 sisters  Residence: [address] [place] [address], [facility]  [address]  Can return home: Yes  Lives with: Sister, Lino Ramirez, has been staying with him and helping him out  Level of Ed: HS + 3 yrs college  Work History: Unemployed  Income/Source: About $1400/month from [place] [employer]: None  Transportation: Doesn't drive; pt's 2 sisters take turns providing transport  Legal Issues: Denies  Ph[facility] [address] [facility] [place] HX: 1 previous [address] admission at [facility] in ; hx of anxiety, panic attacks, insomnia; denies hx of SA  Trauma HX: Denies  Family hx: Sister - anxiety/depression   D&A HX: Denies  Medical: PMH of strokes in , , ; PVD, afib, GERD, HTN, antiphospholipid antibody with hypercoagulable state  DME: Glasses  Tobacco: Denies   HX: Denies  Access to firearms: Pt's sister, Lino Ramirez, owns firearms which are secured/locked up at pt's home  UDS Results: Negative  PCP: Winnie Arambula   Psych: Dr Ene Mahmood at [facility] has only had 1 appt so far  Therapist: None  ICM/ACT:  None  POA/guardian/rep-payee: None; pt's sister helps him with finances  Stressors:  Medical complications and finances  Coping Skills: Music, walks  ROIS Signed: Lino Ramirez (sister), [facility] (OP psych), PCP  Treatment Plan Signed: TBD  IMM Signed: Yes  Upcoming Appointments: Unknown  Covid vaccine received: Yes, received 2-dose vaccine in Mar/2021 + booster  Discharge disposition: Home    Pt states that he's bored being here and when things get exciting, it frightens him   Additionally, pt reports that the unit looks like a airplane runway at night due to all the noises and bright lights around the bed  Pt feels "stuck" here  He reports feeling extremely bloated  He states that he was dry heaving earlier  He reported symptoms to staff  Pt also states that he doesn't like the new medication that was started today  He is tired of being on so many pills  Pt reports 5/10 back pain currently  RN notified  Spoke with pt's sister, Emely Haley [phone], to obtain collateral info  Emely Tera reports that the meds pt has been on are not helping at all  Pt is extremely anxious about everything  His sleep and appetite have been very poor  He was having frequent panic attacks/epsiodes of hyperventilation which cause him to become frantic  He is afraid to be home alone and won't go out to [employer], grocery store, etc  Emelyrosalba Haley doesn't feel that pt is safe to drive because of the high anxiety  Emely Haley confirmed that she's been staying with pt at his home  There are a few antique guns in the home but they do not work and they are secured  Emely Haley wants pt to be calm and still be able to function  Baseline: completing all ADLs independently including driving, going to store/appts, meal prep, handling finances, going out with friends, good appetite, adequate sleep  Emely Haley works outside of the home and cannot stay with pt 24/7

## 2022-09-29 NOTE — TELEPHONE ENCOUNTER
I have discussed case with Dr Hussain Ritchie,  At this time we will wait to see if while he is in the inpatient Psychiatric Unit medications are changed and will re-assess any renewals that are needed when he is discharged

## 2022-09-29 NOTE — QUICK NOTE
INR is 3 14 today  Goal is 2-3  Patient is on warfarin 4 mg daily  Hold warfarin for today and follow INR tomorrow

## 2022-09-29 NOTE — NURSING NOTE
Pt arrived voluntarily from the [facility] ED  Reported that he was directed to go to the ER by his outpatient psychiatrist Dr Jorge Walker  Stated, "I just have such bad anxiety  I can't sleep and everything bothers me"  Presented as anxious and expressed concerns regarding all aspects of his care and being  Stated, "everything has been amplified in the past few days  I can't do anything anymore"  Speech is halting and he stutters  Difficulty articulating his thoughts  Stated, "come on help me, help me say what I am thinking"  Verbalized ongoing fear, repetitive and obsessive  Denies si  Reports history of anxiety and depression and verbalized that he was suicidal and hospitalized 11/6/2004 after he expressed suicidal thoughts post stroke  Stated, "I was just frustrated and then said something stupid and ended up in the hospital"  No plan  Medical history significant for hx of stroke  And TIA, implantable loop recorder, IBS, HTN and GERD  Notified Dr Dayanara Matias of recent INR and no orders obtained for Coumadin  Oriented to the unit  No questions verbalized  Will continue to monitor and maintain q 7 min checks

## 2022-09-29 NOTE — PLAN OF CARE
Problem: DISCHARGE PLANNING  Goal: Discharge to home or other facility with appropriate resources  Description: INTERVENTIONS:  - Identify barriers to discharge w/patient and caregiver  - Arrange for needed discharge resources and transportation as appropriate  - Identify discharge learning needs (meds, wound care, etc )  - Arrange for interpretive services to assist at discharge as needed  - Refer to Case Management Department for coordinating discharge planning if the patient needs post-hospital services based on physician/advanced practitioner order or complex needs related to functional status, cognitive ability, or social support system  Outcome: Progressing     Problem: Ineffective Coping  Goal: Cooperates with admission process  Description: Interventions:   - Complete admission process  Outcome: Progressing  Goal: Identifies ineffective coping skills  Outcome: Progressing  Goal: Identifies healthy coping skills  Outcome: Progressing  Goal: Demonstrates healthy coping skills  Outcome: Progressing  Goal: Participates in unit activities  Description: Interventions:  - Provide therapeutic environment   - Provide required programming   - Redirect inappropriate behaviors   Outcome: Progressing  Goal: Patient/Family participate in treatment and DC plans  Description: Interventions:  - Provide therapeutic environment  Outcome: Progressing  Goal: Patient/Family verbalizes awareness of resources  Outcome: Progressing  Goal: Understands least restrictive measures  Description: Interventions:  - Utilize least restrictive behavior  Outcome: Progressing  Goal: Free from restraint events  Description: - Utilize least restrictive measures   - Provide behavioral interventions   - Redirect inappropriate behaviors   Outcome: Progressing     Problem: Anxiety  Goal: Anxiety is at manageable level  Description: Interventions:  - Assess and monitor patient's anxiety level     - Monitor for signs and symptoms (heart palpitations, chest pain, shortness of breath, headaches, nausea, feeling jumpy, restlessness, irritable, apprehensive)  - Collaborate with interdisciplinary team and initiate plan and interventions as ordered    - [place] patient to unit/surroundings  - Explain treatment plan  - Encourage participation in care  - Encourage verbalization of concerns/fears  - Identify coping mechanisms  - Assist in developing anxiety-reducing skills  - Administer/offer alternative therapies  - Limit or eliminate stimulants  Outcome: Progressing

## 2022-09-29 NOTE — ED PROVIDER NOTES
History  Chief Complaint   Patient presents with   • Psychiatric Evaluation     Patient states he is having a panic attack  Dr Elian Baker called and told him to come in  Patient denies SI/HI/AH/VH     Patient is a 22-year-old male with a history of anxiety sent in for admission by his private psychiatrist   Worsening anxiety and panic attacks over the last few weeks  Decreased appetite  Not caring for self  Per sister, not allowed to drive any more  Cannot make any decision  Recently at another hospital but not admitted due to lack of beds  No si/hi/hallucinations  Prior to Admission Medications   Prescriptions Last Dose Informant Patient Reported? Taking? Klor-Con M20 20 MEQ tablet  Self No No   Sig: TAKE 1 TABLET BY MOUTH EVERY DAY   LORazepam (ATIVAN) 0 5 mg tablet   No No   Sig: Take 1 tablet (0 5 mg total) by mouth every 8 (eight) hours as needed for anxiety Do not start before October 3, 2022     acetaminophen (TYLENOL) 500 mg tablet  Self Yes No   Sig: Take 500 mg by mouth every 6 (six) hours as needed for mild pain 2 tab   amoxicillin (AMOXIL) 500 mg capsule  Self Yes No   Patient not taking: Reported on 9/22/2022   atorvastatin (LIPITOR) 40 mg tablet  Self No No   Sig: Take 1 tablet (40 mg total) by mouth daily with dinner   carvedilol (COREG) 12 5 mg tablet   No No   Sig: TAKE 1 TABLET BY MOUTH TWICE A DAY WITH FOOD   famotidine (PEPCID) 40 MG tablet  Self No No   Sig: Take 1 tablet (40 mg total) by mouth 2 (two) times a day   furosemide (LASIX) 20 mg tablet  Self No No   Sig: TAKE 1 TABLET BY MOUTH EVERY DAY   mirtazapine (REMERON) 15 mg tablet   No No   Sig: Take 1 5 tablets (22 5 mg total) by mouth daily at bedtime   pantoprazole (PROTONIX) 40 mg tablet   No No   Sig: TAKE 1 TABLET BY MOUTH EVERY DAY   Patient taking differently: 2 (two) times a day   sertraline (Zoloft) 25 mg tablet   No No   Sig: Take 1 tablet (25 mg total) by mouth daily   warfarin (COUMADIN) 3 mg tablet  Self No No Sig: Do not fill continue to take as prior plan   warfarin (COUMADIN) 4 mg tablet   No No   Sig: TAKE 1 TABLET BY MOUTH EVERY DAY   warfarin (COUMADIN) 5 mg tablet  Self No No   Sig: CONTINUE TO TAKE AS PRIOR DO NOT FILL      Facility-Administered Medications: None       Past Medical History:   Diagnosis Date   • Aneurysm of thoracic aorta ([facility] [address] )     last assessed 11/20/17   • Anxiety     currently on no meds   • Arthritis    • Bilateral inguinal hernia    • Cardiac disease    • Depression    • GERD (gastroesophageal reflux disease)    • Hearing loss of aging    • Hyperlipidemia    • Hypertension    • Irritable bowel syndrome    • Kidney stone    • Obesity    • Occasional tremors     left arm since stroke   • Palpitations    • PONV (postoperative nausea and vomiting)     and headache x 3 days   • Sciatica     right and left  side   • Shortness of breath     on exertion   • Sleep apnea     is not using a CPAP   • Spitting up blood 05/21/2021    Resolved   • Status post placement of implantable loop recorder    • Stroke ([facility] [address] ) 11/2014   • [facility] 03/2021   • TIA (transient ischemic attack)        Past Surgical History:   Procedure Laterality Date   • AORTA SURGERY      thoracic - aneurysmorrhaphy   • APPENDECTOMY     • ASCENDING AORTIC ANEURYSM REPAIR      resolved 8/19/15   • CARDIAC LOOP RECORDER     • CHOLECYSTECTOMY      laparoscopic   • COLONOSCOPY     • CYSTOSCOPY      with removal of object- stent removal   • KNEE ARTHROSCOPY Right 1996    with medial meniscus repair   • LITHOTRIPSY      renal   • ORTHOPEDIC SURGERY     • OR FRAGMENT KIDNEY STONE/ ESWL Left 5/17/2018    Procedure: LITHROTRIPSY EXTRACORPORAL SHOCKWAVE (ESWL);   Surgeon: Rosa Hull MD;  [facility]: [facility];  Service: Urology   • OR [address] Bilateral 12/9/2021    Procedure: ROBOTIC REPAIR HERNIA INGUINAL;  Surgeon: Betty Fields MD;  Location: [facility];  Service: General   • THUMB ARTHROSCOPY Right 1976 ligament repair   • UPPER GASTROINTESTINAL ENDOSCOPY         Family History   Problem Relation Age of Onset   • Diverticulitis Mother         of colon   • Nephrolithiasis Mother    • Emphysema Father    • Nephrolithiasis Sister    • Heart attack Maternal Grandfather 72   • Breast cancer Paternal Grandmother    • Lung cancer Paternal Grandfather    • Cancer Family    • Coronary artery disease Family    • Diabetes Family         sibling   • Hypertension Family         sibling   • Hernia Family         sibling     I have reviewed and agree with the history as documented  E-Cigarette/Vaping   • E-Cigarette Use Never User      E-Cigarette/Vaping Substances   • Nicotine No    • THC No    • CBD No    • Flavoring No    • Other No    • Unknown No      Social History     Tobacco Use   • Smoking status: Never Smoker   • Smokeless tobacco: Never Used   • Tobacco comment: no second hand smoke exposure   Vaping Use   • Vaping Use: Never used   Substance Use Topics   • Alcohol use: Not Currently   • Drug use: No       Review of Systems   Constitutional: Negative  HENT: Negative  Eyes: Negative  Respiratory: Negative  Cardiovascular: Negative  Gastrointestinal: Negative  Endocrine: Negative  Genitourinary: Negative  Musculoskeletal: Negative  Skin: Negative  Allergic/Immunologic: Negative  Neurological: Negative  Hematological: Negative  Psychiatric/Behavioral: Positive for agitation and dysphoric mood  The patient is nervous/anxious  All other systems reviewed and are negative  Physical Exam  Physical Exam  Vitals and nursing note reviewed  Constitutional:       Appearance: Normal appearance  He is obese  HENT:      Head: Normocephalic and atraumatic  Nose: Nose normal       Mouth/Throat:      Mouth: Mucous membranes are moist       Pharynx: Oropharynx is clear  Cardiovascular:      Rate and Rhythm: Normal rate and regular rhythm  Pulses: Normal pulses        Heart sounds: Normal heart sounds  Pulmonary:      Effort: Pulmonary effort is normal       Breath sounds: Normal breath sounds  Abdominal:      General: Bowel sounds are normal       Palpations: Abdomen is soft  Musculoskeletal:         General: Normal range of motion  Cervical back: Normal range of motion and neck supple  Skin:     General: Skin is warm and dry  Capillary Refill: Capillary refill takes less than 2 seconds  Neurological:      General: No focal deficit present  Mental Status: He is alert and oriented to person, place, and time  Psychiatric:         Attention and Perception: Attention normal          Mood and Affect: Mood is anxious  Speech: Speech is delayed  Behavior: Behavior is agitated  Thought Content: Thought content does not include homicidal or suicidal ideation           Cognition and Memory: Cognition normal          Judgment: Judgment normal          Vital Signs  ED Triage Vitals [09/28/22 1825]   Temperature Pulse Respirations Blood Pressure SpO2   98 4 °F (36 9 °C) 89 16 (!) 187/101 95 %      Temp Source Heart Rate Source Patient Position - Orthostatic VS BP Location FiO2 (%)   Oral Monitor Sitting Left arm --      Pain Score       --           Vitals:    09/28/22 1825   BP: (!) 187/101   Pulse: 89   Patient Position - Orthostatic VS: Sitting         Visual Acuity      ED Medications  Medications   risperiDONE (RisperDAL M-TAB) disintegrating tablet 2 mg (2 mg Oral Given 9/28/22 2015)   LORazepam (ATIVAN) tablet 2 mg (2 mg Oral Given 9/28/22 2015)       Diagnostic Studies  Results Reviewed     Procedure Component Value Units Date/Time    Rapid drug screen, urine [[id number]]  (Normal) Collected: 09/28/22 1932    Lab Status: Final result Specimen: Urine, Clean Catch Updated: 09/28/22 1957     Amph/Meth UR Negative     Barbiturate Ur Negative     Benzodiazepine Urine Negative     Cocaine Urine Negative     Methadone Urine Negative     Opiate Urine Negative     PCP Ur Negative     THC Urine Negative     Oxycodone Urine Negative    Narrative:      FOR MEDICAL PURPOSES ONLY  IF CONFIRMATION NEEDED PLEASE CONTACT THE LAB WITHIN 5 DAYS  Drug Screen Cutoff Levels:  AMPHETAMINE/METHAMPHETAMINES  1000 ng/mL  BARBITURATES     200 ng/mL  BENZODIAZEPINES     200 ng/mL  COCAINE      300 ng/mL  METHADONE      300 ng/mL  OPIATES      300 ng/mL  PHENCYCLIDINE     25 ng/mL  THC       50 ng/mL  OXYCODONE      100 ng/mL    FLU/RSV/COVID - if FLU/RSV clinically relevant [[id number]]  (Normal) Collected: 09/28/22 1900    Lab Status: Final result Specimen: Nares from Nose Updated: 09/28/22 1943     SARS-CoV-2 Negative     INFLUENZA A PCR Negative     INFLUENZA B PCR Negative     RSV PCR Negative    Narrative:      FOR PEDIATRIC PATIENTS - copy/paste COVID Guidelines URL to browser: [email or web address]  [email or web address]    SARS-CoV-2 assay is a Nucleic Acid Amplification assay intended for the  qualitative detection of nucleic acid from SARS-CoV-2 in nasopharyngeal  swabs  Results are for the presumptive identification of SARS-CoV-2 RNA  Positive results are indicative of infection with SARS-CoV-2, the virus  causing COVID-19, but do not rule out bacterial infection or co-infection  with other viruses  Laboratories within the [place] and its  territories are required to report all positive results to the appropriate  public health authorities  Negative results do not preclude SARS-CoV-2  infection and should not be used as the sole basis for treatment or other  patient management decisions  Negative results must be combined with  clinical observations, patient history, and epidemiological information  This test has not been FDA cleared or approved  This test has been authorized by FDA under an Emergency Use Authorization  (EUA)   This test is only authorized for the duration of time the  declaration that circumstances exist justifying the authorization of the  emergency use of an in vitro diagnostic tests for detection of SARS-CoV-2  virus and/or diagnosis of COVID-19 infection under section 564(b)(1) of  the Act, 21 U  S C  104JVV-1(K)(4), unless the authorization is terminated  or revoked sooner  The test has been validated but independent review by FDA  and CLIA is pending  Test performed using [employer] GeneXpert: This RT-PCR assay targets N2,  a region unique to SARS-CoV-2  A conserved region in the E-gene was chosen  for pan-Sarbecovirus detection which includes SARS-CoV-2  According to CMS-2020-01-R, this platform meets the definition of high-throughput technology      UA w Reflex to Microscopic w Reflex to Culture [[id number]]  (Abnormal) Collected: 09/28/22 1932    Lab Status: Final result Specimen: Urine, Clean Catch Updated: 09/28/22 1941     Color, UA Yvonne     Clarity, UA Clear     Specific Cobb, UA 1 020     pH, UA 6 0     Leukocytes, UA Negative     Nitrite, UA Negative     Protein, UA Negative mg/dl      Glucose, UA Negative mg/dl      Ketones, UA 5 (Trace) mg/dl      Bilirubin, UA Negative     Occult Blood, UA Negative     UROBILINOGEN UA Negative mg/dL     Basic metabolic panel [[id number]]  (Abnormal) Collected: 09/28/22 1913    Lab Status: Final result Specimen: Blood from Arm, Right Updated: 09/28/22 1931     Sodium 130 mmol/L      Potassium 3 5 mmol/L      Chloride 97 mmol/L      CO2 27 mmol/L      ANION GAP 6 mmol/L      BUN 8 mg/dL      Creatinine 0 94 mg/dL      Glucose 120 mg/dL      Calcium 8 8 mg/dL      eGFR 86 ml/min/1 73sq m     Narrative:      Sarah guidelines for Chronic Kidney Disease (CKD):   •  Stage 1 with normal or high GFR (GFR > 90 mL/min/1 73 square meters)  •  Stage 2 Mild CKD (GFR = 60-89 mL/min/1 73 square meters)  •  Stage 3A Moderate CKD (GFR = 45-59 mL/min/1 73 square meters)  •  Stage 3B Moderate CKD (GFR = 30-44 mL/min/1 73 square meters)  •  Stage 4 Severe CKD (GFR = 15-29 mL/min/1 73 square meters)  •  Stage 5 End Stage CKD (GFR <15 mL/min/1 73 square meters)  Note: GFR calculation is accurate only with a steady state creatinine    CBC and differential [[id number]]  (Abnormal) Collected: 09/28/22 1913    Lab Status: Final result Specimen: Blood from Arm, Right Updated: 09/28/22 1919     WBC 6 36 Thousand/uL      RBC 4 69 Million/uL      Hemoglobin 14 0 g/dL      Hematocrit 41 0 %      MCV 87 fL      MCH 29 9 pg      MCHC 34 1 g/dL      RDW 14 1 %      MPV 9 6 fL      Platelets 215 Thousands/uL      nRBC 0 /100 WBCs      Neutrophils Relative 76 %      Immat GRANS % 0 %      Lymphocytes Relative 12 %      Monocytes Relative 11 %      Eosinophils Relative 1 %      Basophils Relative 0 %      Neutrophils Absolute 4 84 Thousands/µL      Immature Grans Absolute 0 01 Thousand/uL      Lymphocytes Absolute 0 74 Thousands/µL      Monocytes Absolute 0 70 Thousand/µL      Eosinophils Absolute 0 05 Thousand/µL      Basophils Absolute 0 02 Thousands/µL     POCT alcohol breath test [[id number]]  (Normal) Resulted: 09/28/22 1858    Lab Status: Final result Updated: 09/28/22 1858     EXTBreath Alcohol 0 000                 No orders to display              Procedures  Procedures         ED Course                               SBIRT 22yo+    Flowsheet Row Most Recent Value   SBIRT (25 yo +)    In order to provide better care to our patients, we are screening all of our patients for alcohol and drug use  Would it be okay to ask you these screening questions? Yes Filed at: 09/28/2022 9542   Initial Alcohol Screen: US AUDIT-C     1  How often do you have a drink containing alcohol? 0 Filed at: 09/28/2022 1852   2  How many drinks containing alcohol do you have on a typical day you are drinking? 0 Filed at: 09/28/2022 1852   3a  Male UNDER 65: How often do you have five or more drinks on one occasion? 0 Filed at: 09/28/2022 1852   3b  FEMALE Any Age, or MALE 65+:  How often do you have 4 or more drinks on one occassion? 0 Filed at: 09/28/2022 1852   Audit-C Score 0 Filed at: 09/28/2022 3384   AMARIS: How many times in the past year have you    Used an illegal drug or used a prescription medication for non-medical reasons? Never Filed at: 09/28/2022 7764                    [facility]  Number of Diagnoses or Management Options     Amount and/or Complexity of Data Reviewed  Clinical lab tests: ordered and reviewed  Tests in the medicine section of CPT®: reviewed and ordered  Obtain history from someone other than the patient: yes  Review and summarize past medical records: yes  Independent visualization of images, tracings, or specimens: yes        Disposition  Final diagnoses:   Anxiety     Time reflects when diagnosis was documented in both MDM as applicable and the Disposition within this note     Time User Action Codes Description Comment    9/28/2022  8:08 PM Tayo Jimenez Add [F41 9] Anxiety       ED Disposition     ED Disposition   Transfer to [address] [place] [address]   --    Date/Time   Wed Sep 28, 2022  8:08 PM    Comment   Evi Taylor should be transferred out to [facility] and has been medically cleared  Follow-up Information    None         Patient's Medications   Discharge Prescriptions    No medications on file       No discharge procedures on file      PDMP Review       Value Time User    PDMP Reviewed  Yes 9/28/2022  6:20 PM Richard Munoz MD          ED Provider  Electronically Signed by           Gudelia Dang MD  09/28/22 1328

## 2022-09-29 NOTE — ED NOTES
Patient is accepted at [address]  Patient is accepted by Apolinar French MD        Nurse report is to be called at 1400 to [phone] prior to patient transfer

## 2022-09-29 NOTE — DISCHARGE INSTR - OTHER ORDERS
You are being discharged to your residence: [address] [place] [address] 22    If you are unable to deal with your distressed mood alone please contact [facility] ([facility] AT [place] # [phone], dial 911 or go to the nearest emergency center  [facility] Crisis Intervention: [address] Suicide Prevention Lifeline:  4-683.180.9756  *Alcohol Anonymous: [address] ([place]) HELPLINE: 470.605.9695/Website: www raúl org  *Substance Abuse and [address] [facility] [employer], which is a confidential, free, 24-hour-a-day, 365-day-a-year, information service for individuals and family members facing mental health and/or substance use disorders  This service provides referrals to local treatment facilities, support groups, and community-based organizations  Callers can also order free publications and other information  Call 4-395.992.4349/Website: www [facility] gov  *[facility] 2-1-1: This is a toll free, confidential, 24-hour-a-day service which connects you to a community  in your area who can help you find services and resources that are available to you locally and provide critical services that can improve and save lives    Call: 211  /Website: https://lisa finley/

## 2022-09-29 NOTE — ED NOTES
Insurance Authorization for admission:   Phone call placed to [employer]  Phone number: [phone]  Spoke to [facility]  5 days approved, LCD 10/3  Level of care: IP  Review on 10/3  Amparo Servin will reach out to [facility] for updates on:  -What is his baseline?  -Medication adjustments and changes?  -Neurology eval for specific deficits? Treatment plan and discharge plan? Authorization # [id number]

## 2022-09-30 PROBLEM — F41.0 ANXIETY DISORDER DUE TO GENERAL MEDICAL CONDITION WITH PANIC ATTACK: Status: ACTIVE | Noted: 2022-09-30

## 2022-09-30 PROBLEM — F06.4 ANXIETY DISORDER DUE TO GENERAL MEDICAL CONDITION WITH PANIC ATTACK: Status: ACTIVE | Noted: 2022-09-30

## 2022-09-30 PROBLEM — Z00.8 MEDICAL CLEARANCE FOR INCARCERATION: Status: ACTIVE | Noted: 2022-09-30

## 2022-09-30 LAB
ALBUMIN SERPL BCP-MCNC: 4.5 G/DL (ref 3.5–5)
ALP SERPL-CCNC: 105 U/L (ref 43–122)
ALT SERPL W P-5'-P-CCNC: 31 U/L
ANION GAP SERPL CALCULATED.3IONS-SCNC: 10 MMOL/L (ref 5–14)
AST SERPL W P-5'-P-CCNC: 30 U/L (ref 17–59)
BASOPHILS # BLD AUTO: 0.02 THOUSANDS/ÂΜL (ref 0–0.1)
BASOPHILS NFR BLD AUTO: 0 % (ref 0–1)
BILIRUB SERPL-MCNC: 3.57 MG/DL (ref 0.2–1)
BUN SERPL-MCNC: 8 MG/DL (ref 5–25)
CALCIUM SERPL-MCNC: 9.3 MG/DL (ref 8.4–10.2)
CHLORIDE SERPL-SCNC: 99 MMOL/L (ref 96–108)
CHOLEST SERPL-MCNC: 120 MG/DL
CO2 SERPL-SCNC: 26 MMOL/L (ref 21–32)
CREAT SERPL-MCNC: 0.81 MG/DL (ref 0.7–1.5)
EOSINOPHIL # BLD AUTO: 0.09 THOUSAND/ÂΜL (ref 0–0.61)
EOSINOPHIL NFR BLD AUTO: 1 % (ref 0–6)
ERYTHROCYTE [DISTWIDTH] IN BLOOD BY AUTOMATED COUNT: 14.1 % (ref 11.6–15.1)
FOLATE SERPL-MCNC: 7.2 NG/ML (ref 3.1–17.5)
GFR SERPL CREATININE-BSD FRML MDRD: 95 ML/MIN/1.73SQ M
GLUCOSE P FAST SERPL-MCNC: 125 MG/DL (ref 70–99)
GLUCOSE SERPL-MCNC: 125 MG/DL (ref 70–99)
HCT VFR BLD AUTO: 46.4 % (ref 36.5–49.3)
HDLC SERPL-MCNC: 34 MG/DL
HGB BLD-MCNC: 16.4 G/DL (ref 12–17)
IMM GRANULOCYTES # BLD AUTO: 0.02 THOUSAND/UL (ref 0–0.2)
IMM GRANULOCYTES NFR BLD AUTO: 0 % (ref 0–2)
INR PPP: 1.56 (ref 0.84–1.19)
INR PPP: 1.67 (ref 0.84–1.19)
LDLC SERPL CALC-MCNC: 68 MG/DL
LYMPHOCYTES # BLD AUTO: 0.84 THOUSANDS/ÂΜL (ref 0.6–4.47)
LYMPHOCYTES NFR BLD AUTO: 11 % (ref 14–44)
MCH RBC QN AUTO: 30.5 PG (ref 26.8–34.3)
MCHC RBC AUTO-ENTMCNC: 35.3 G/DL (ref 31.4–37.4)
MCV RBC AUTO: 86 FL (ref 82–98)
MONOCYTES # BLD AUTO: 0.57 THOUSAND/ÂΜL (ref 0.17–1.22)
MONOCYTES NFR BLD AUTO: 8 % (ref 4–12)
NEUTROPHILS # BLD AUTO: 5.96 THOUSANDS/ÂΜL (ref 1.85–7.62)
NEUTS SEG NFR BLD AUTO: 80 % (ref 43–75)
NONHDLC SERPL-MCNC: 86 MG/DL
NRBC BLD AUTO-RTO: 0 /100 WBCS
PLATELET # BLD AUTO: 185 THOUSANDS/UL (ref 149–390)
PMV BLD AUTO: 10.4 FL (ref 8.9–12.7)
POTASSIUM SERPL-SCNC: 3.6 MMOL/L (ref 3.5–5.3)
PROT SERPL-MCNC: 7.5 G/DL (ref 6.4–8.4)
PROTHROMBIN TIME: 19 SECONDS (ref 11.6–14.5)
PROTHROMBIN TIME: 20.1 SECONDS (ref 11.6–14.5)
RBC # BLD AUTO: 5.37 MILLION/UL (ref 3.88–5.62)
SODIUM SERPL-SCNC: 135 MMOL/L (ref 135–147)
TRIGL SERPL-MCNC: 90 MG/DL
TSH SERPL DL<=0.05 MIU/L-ACNC: 0.26 UIU/ML (ref 0.45–4.5)
VIT B12 SERPL-MCNC: 291 PG/ML (ref 100–900)
WBC # BLD AUTO: 7.5 THOUSAND/UL (ref 4.31–10.16)

## 2022-09-30 PROCEDURE — 99221 1ST HOSP IP/OBS SF/LOW 40: CPT | Performed by: STUDENT IN AN ORGANIZED HEALTH CARE EDUCATION/TRAINING PROGRAM

## 2022-09-30 PROCEDURE — 85610 PROTHROMBIN TIME: CPT | Performed by: PHYSICIAN ASSISTANT

## 2022-09-30 PROCEDURE — 80053 COMPREHEN METABOLIC PANEL: CPT | Performed by: PSYCHIATRY & NEUROLOGY

## 2022-09-30 PROCEDURE — 80061 LIPID PANEL: CPT | Performed by: PSYCHIATRY & NEUROLOGY

## 2022-09-30 PROCEDURE — 99222 1ST HOSP IP/OBS MODERATE 55: CPT | Performed by: INTERNAL MEDICINE

## 2022-09-30 PROCEDURE — 82746 ASSAY OF FOLIC ACID SERUM: CPT | Performed by: PSYCHIATRY & NEUROLOGY

## 2022-09-30 PROCEDURE — 85610 PROTHROMBIN TIME: CPT | Performed by: INTERNAL MEDICINE

## 2022-09-30 PROCEDURE — 82652 VIT D 1 25-DIHYDROXY: CPT | Performed by: PSYCHIATRY & NEUROLOGY

## 2022-09-30 PROCEDURE — 85025 COMPLETE CBC W/AUTO DIFF WBC: CPT | Performed by: PSYCHIATRY & NEUROLOGY

## 2022-09-30 PROCEDURE — 82607 VITAMIN B-12: CPT | Performed by: PSYCHIATRY & NEUROLOGY

## 2022-09-30 PROCEDURE — 84443 ASSAY THYROID STIM HORMONE: CPT | Performed by: PSYCHIATRY & NEUROLOGY

## 2022-09-30 RX ORDER — WARFARIN SODIUM 2 MG/1
4 TABLET ORAL
Status: DISCONTINUED | OUTPATIENT
Start: 2022-10-01 | End: 2022-10-01

## 2022-09-30 RX ORDER — WARFARIN SODIUM 5 MG/1
5 TABLET ORAL
Status: COMPLETED | OUTPATIENT
Start: 2022-09-30 | End: 2022-09-30

## 2022-09-30 RX ORDER — PANTOPRAZOLE SODIUM 40 MG/1
40 TABLET, DELAYED RELEASE ORAL 2 TIMES DAILY
Status: DISCONTINUED | OUTPATIENT
Start: 2022-09-30 | End: 2022-10-26 | Stop reason: HOSPADM

## 2022-09-30 RX ORDER — LORAZEPAM 0.5 MG/1
0.25 TABLET ORAL 2 TIMES DAILY
Status: DISCONTINUED | OUTPATIENT
Start: 2022-09-30 | End: 2022-10-01

## 2022-09-30 RX ORDER — ARIPIPRAZOLE 2 MG/1
2 TABLET ORAL DAILY
Status: DISCONTINUED | OUTPATIENT
Start: 2022-09-30 | End: 2022-10-03

## 2022-09-30 RX ORDER — FAMOTIDINE 20 MG/1
40 TABLET, FILM COATED ORAL 2 TIMES DAILY
Status: DISCONTINUED | OUTPATIENT
Start: 2022-09-30 | End: 2022-10-26 | Stop reason: HOSPADM

## 2022-09-30 RX ADMIN — LORAZEPAM 0.25 MG: 0.5 TABLET ORAL at 11:42

## 2022-09-30 RX ADMIN — CARVEDILOL 12.5 MG: 12.5 TABLET, FILM COATED ORAL at 17:00

## 2022-09-30 RX ADMIN — ACETAMINOPHEN 650 MG: 325 TABLET ORAL at 22:56

## 2022-09-30 RX ADMIN — FAMOTIDINE 40 MG: 20 TABLET ORAL at 11:42

## 2022-09-30 RX ADMIN — PANTOPRAZOLE SODIUM 40 MG: 40 TABLET, DELAYED RELEASE ORAL at 11:43

## 2022-09-30 RX ADMIN — ATORVASTATIN CALCIUM 40 MG: 40 TABLET, FILM COATED ORAL at 17:00

## 2022-09-30 RX ADMIN — ACETAMINOPHEN 650 MG: 325 TABLET ORAL at 18:19

## 2022-09-30 RX ADMIN — TRAZODONE HYDROCHLORIDE 50 MG: 50 TABLET ORAL at 23:36

## 2022-09-30 RX ADMIN — POTASSIUM CHLORIDE 20 MEQ: 1500 TABLET, EXTENDED RELEASE ORAL at 08:29

## 2022-09-30 RX ADMIN — MIRTAZAPINE 22.5 MG: 7.5 TABLET, FILM COATED ORAL at 22:00

## 2022-09-30 RX ADMIN — FUROSEMIDE 20 MG: 40 TABLET ORAL at 08:29

## 2022-09-30 RX ADMIN — ACETAMINOPHEN 650 MG: 325 TABLET ORAL at 11:59

## 2022-09-30 RX ADMIN — PROPRANOLOL HYDROCHLORIDE 5 MG: 10 TABLET ORAL at 03:48

## 2022-09-30 RX ADMIN — SENNOSIDES AND DOCUSATE SODIUM 1 TABLET: 50; 8.6 TABLET ORAL at 11:59

## 2022-09-30 RX ADMIN — PANTOPRAZOLE SODIUM 40 MG: 40 TABLET, DELAYED RELEASE ORAL at 22:00

## 2022-09-30 RX ADMIN — LORAZEPAM 0.25 MG: 0.5 TABLET ORAL at 17:00

## 2022-09-30 RX ADMIN — WARFARIN SODIUM 5 MG: 5 TABLET ORAL at 17:00

## 2022-09-30 RX ADMIN — FAMOTIDINE 40 MG: 20 TABLET ORAL at 17:00

## 2022-09-30 RX ADMIN — CARVEDILOL 12.5 MG: 12.5 TABLET, FILM COATED ORAL at 08:29

## 2022-09-30 RX ADMIN — ARIPIPRAZOLE 2 MG: 2 TABLET ORAL at 11:42

## 2022-09-30 RX ADMIN — MELATONIN TAB 3 MG 3 MG: 3 TAB at 22:00

## 2022-09-30 RX ADMIN — SERTRALINE HYDROCHLORIDE 50 MG: 50 TABLET ORAL at 11:42

## 2022-09-30 NOTE — NURSING NOTE
Pt isolated to self in dayroom  Pt reports feeling "a little edgy  Just the change of environment and I don't like my roommate"  Pt counseled  Pt reports depression saying "it is not a 10 or a 5"  Pt reports anxiety 2-4  Pt reports scheduled night time medication "are not going to cut it  I am not going to make it, tell me I will make it  I'm going to die here"  Pt approached nursing station saying "what if I cannot sleep? What if I am in my room and need something? Do I just yell until you come?"  Pt counseled and educated on how to make needs known  Pt responded "well you're the professional can't you just tell me what I need?"  Pt counseled and offered PRN medication for anxiety  Pt refused "I cannot it will interact with my medication"  Pt refused education  Pt said "I don't know when I have to use the bathroom " Pt went to utilize bathroom on unit [facility] and reports "I see there is a lock in this bathroom I cannot use it because if I pass out I will not get help"  During HS medication pass patient saying "when will I know to come back out to say if I am not sleeping? How will I know if I am not sleeping, can you tell me if I am sleeping so I know?" pt counseled  Pt received PRN atarax 50 mg at 2115  Pt approached nursing station with c/o not being able to sleep  Trazodone 50 mg administered at 2226 with much encouragement  Pt said "what are you going to do for me when I have my meltdown in the morning  I need my ativan then " pt counseled and not accepting  Pt encouraged to use coping mechanisms and refusing  Pt approached nursing station tapping nursing station, pacing back and forth, and saying "this isn't okay"  Pt received PRN propranolol 5 mg at 0348 for restlessness  Pt said "will I have to tell everyone that I am taking this  I want everyone who comes here to know what is going on so I am not alone"   Attempted to  patient and encourage coping mechanisms, pt responded "I need medication, this isn't going to work I don't know what to do"

## 2022-09-30 NOTE — PROGRESS NOTES
Pt attended group late  Pt restless and anxious  Pt unable to engage in discussion when prompted  Hopeless and helpless  Pt declined completing admission self assessment at this time stating "I can't "  Visibly trembling  09/30/22 1100   Activity/Group Checklist   Group Other (Comment)  ( Recovery: coping skills)   Attendance Attended; Other (Comment)  (late)   Attendance Duration (min) 31-45   Interactions Did not interact   Affect/Mood Appropriate   Goals Achieved Able to self-disclose; Able to recieve feedback; Able to manage/cope with feelings; Able to engage in interactions; Able to reflect/comment on own behavior;Able to listen to others; Identified triggers

## 2022-09-30 NOTE — TREATMENT TEAM
09/30/22 2600 Christian Veronica [facility]   Provider Name [facility]   Multi-disciplinary Rounds   Vital Signs Reviewed   Provider made aware of pt automatic BP at 0328 194/110 and manual at 0333 180/100  Pt visibly anxious, tremor left arm noted  No further complaints from pt  Administered propranolol 5 mg at 0348  And recheck   BP at 0458 178/98 manual

## 2022-09-30 NOTE — ASSESSMENT & PLAN NOTE
· BP labile   Per chart review, it appears that BP is well controlled on Carvediol, recent BP elevations are likely 2/2 to anxiety  · Continue pre hospital carvedilol 12 5mg bid  · Continue to monitor VS per unit protocol

## 2022-09-30 NOTE — ASSESSMENT & PLAN NOTE
Patient is medically cleared for admission to the [facility] for treatment of the underlying psychiatric illness  SLIM will sign off - please call with questions or concerns

## 2022-09-30 NOTE — PLAN OF CARE
Pt attends group and visible      Problem: Ineffective Coping  Goal: Participates in unit activities  Description: Interventions:  - Provide therapeutic environment   - Provide required programming   - Redirect inappropriate behaviors   Outcome: Progressing

## 2022-09-30 NOTE — CMS CERTIFICATION NOTE
Certification: Based upon physical, mental and social evaluations, I certify that inpatient psychiatric services are medically necessary for this patient for a duration of 7 midnights for the treatment of generalized anxiety disorder  Available alternative community resources do not meet the patient's mental health care needs  I further attest that an established written individualized plan of care has been implemented and is outlined in the patient's medical records

## 2022-09-30 NOTE — ASSESSMENT & PLAN NOTE
· Patient with history of multiple strokes  · Anticoagulated with warfarin  · Most recent MRI brain on 08/29/22 shows "Microangiopathic and remote postischemic change as noted without acute intracranial abnormality noted    · Continue warfarin

## 2022-09-30 NOTE — ASSESSMENT & PLAN NOTE
· Post operative  No documented atrial fibrillation since 11/06/14    · Yesterdays EKG shows normal sinus rhythm  · On carvedilol 12 5mg bid for HTN and warfarin 4mg daily for antiphospholipid syndrome  · Continue while inpatient

## 2022-09-30 NOTE — PLAN OF CARE
Problem: Ineffective Coping  Goal: Cooperates with admission process  Description: Interventions:   - Complete admission process  Outcome: Progressing  Goal: Identifies ineffective coping skills  Outcome: Progressing  Goal: Identifies healthy coping skills  Outcome: Progressing  Goal: Demonstrates healthy coping skills  Outcome: Progressing  Goal: Participates in unit activities  Description: Interventions:  - Provide therapeutic environment   - Provide required programming   - Redirect inappropriate behaviors   Outcome: Progressing  Goal: Patient/Family participate in treatment and DC plans  Description: Interventions:  - Provide therapeutic environment  Outcome: Progressing  Goal: Patient/Family verbalizes awareness of resources  Outcome: Progressing  Goal: Understands least restrictive measures  Description: Interventions:  - Utilize least restrictive behavior  Outcome: Progressing  Goal: Free from restraint events  Description: - Utilize least restrictive measures   - Provide behavioral interventions   - Redirect inappropriate behaviors   Outcome: Progressing

## 2022-09-30 NOTE — NURSING NOTE
Pt alert and visible on the unit  Remains preoccupied and obsessive  Needs frequent redirection and support to complete tasks  Easily overwhelmed  Reported that he has no depression and anxiety 2/4  Frequently states, "I can't"  Unable to complete tasks in timely manner  Needs max prompting to take medication as ordered  Stated, "I can't swallow    watch"  Pt then proceeded to swallow and cough  Also believes that he "can't talk " while talking  Refused lunch stating, "I can't    I can't swallow"  Questioning interaction of medication in his stomach while getting "water pill"  No peer interactions  Pacing and restless  Will continue to monitor and maintain q 7 min checks

## 2022-09-30 NOTE — ASSESSMENT & PLAN NOTE
· Follows with Heme/Onc  · Anticoagulated with Warfarin 4mg daily  · INR on 09/28/22 at 3 14, Warfarin was held on 09/28 and 09/29  · Continue with Warfarin 4mg daily   Check INR today  · Continue to Monitor INR while inpatient - goal 2 0 -3 0

## 2022-09-30 NOTE — CASE MANAGEMENT
09/30/22 0849   Team Meeting   Meeting Type Daily Rounds   Initial Conference Date 09/30/22   Team Members Present   Team Members Present Physician;Nurse;;   Physician Team Member Dr Alisha Rivera; Dr Parks Congress Management Team Member [facility] Work Team Member Sung Carrera   Patient/Family Present   Patient Present No   Patient's Family Present No     Not a 30-day readmission  201 admission from [facility] ED for increased anxiety and panic attacks  Sees OP psych at [facility]  Hx of strokes and baseline tremors  Paranoid, 2/4 anxiety, reports depression, received PRN Atarax and Trazodone  Did not sleep all night  Restless  Elevated BP

## 2022-09-30 NOTE — QUICK NOTE
Patient with subtherapeutic INR at 1 56 today  Warfarin has been held the past two days given supratherapeutic IRN  INR goal is 2 - 3  Will give 1 dose of Warfarin 5mg today, and continue with pre hospital dose of 4mg daily tomorrow  Recheck INR tomorrow morning

## 2022-09-30 NOTE — TREATMENT TEAM
09/30/22 1414   Team Meeting   Meeting Type Tx Team Meeting   Initial Conference Date 09/30/22   Team Members Present   Team Members Present Physician;Nurse;   Physician Team Member Dr Amilcar Turcios Team Member [facility] Management Team Member Naya   Patient/Family Present   Patient Present Yes   Patient's Family Present No     Treatment goals include: decreasing anxiety, building coping skills, discharge planning

## 2022-09-30 NOTE — H&P
Psychiatric Evaluation - [address] Moses 58 y o  male MRN: [id number]  Unit/Bed#: [place] 678-35 Encounter: [id number]    Assessment   Principal Problem:    SHIMON (generalized anxiety disorder)  Active Problems:    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 attack    Hypertension    GERD (gastroesophageal reflux disease)    Atrial fibrillation (HCC)    History of stroke    Antiphospholipid antibody with hypercoagulable state ([facility] )    PVD (peripheral vascular disease) ([facility] )    Medical clearance for incarceration    Plan    Admission labs evaluated, see detailed labs below  Collaborate with collaterals for baseline assessment and disposition  • Change home medication of Ativan to Ativan 0 25 mg b i d  For anxiety for 3 days and then discontinue  Patient states that he does not like the Ativan to begin with however he does find it is beneficial for panic attacks  • Start Zoloft 50 mg daily for anxiety  • Start Abilify 2 mg daily for anxiety treatment augmentation  • Discontinue higher dose of hydroxyzine as it has anticholinergic properties    Promote patient participation in therapeutic milieu, group therapy, and occupational therapy  Encourage Hans Vasquezbuddy Ruben to participate in nonverbal forms of therapy including journaling and art/music therapy  Medical management by medical team   Continue frequent safety checks and vitals per unit protocol  The following interventions are recommended: behavioral checks every 7 minutes, continued hospitalization on locked unit     Risks, benefits and possible side effects of Medications:   Risks, benefits, and possible side effects of medications explained to patient and patient verbalizes understanding  Counseling / Coordination of Care:     Patient's presentation on admission and proposed treatment plan discussed with treatment team   Diagnosis, medication changes and treatment plan reviewed with patient    Recent stressors discussed with patient  Events leading to admission reviewed with patient  Importance of medication and treatment compliance reviewed with patient  Chief Complaint:  “Anxiety and I can not make any decisions”    History of Present Illness     Mahsa Corral is a 58 y o  overtly appearing male, , with a PPHx of anxiety, and extensive PMHx of several lacunar strokes in 2014, 2019 2021, status post aortic aneurysm replacement, bicuspid aortic stenosis, anti phospholipid antibody with hypercoagulable state, PVD, paroxysmal AFib, primary hypertension, hyperlipidemia, CORIE, renal cyst, low back pain, weakness, tremor post CVA, who presented to [facility] [address] due to increased anxiety with panic attacks  Patient was admitted to the [facility] Unit on a voluntary 201 commitment basis due to anxiety and Inability to care for self, unsuccessful outpatient treatment, referral from outpatient psychiatrist to inpatient treatment  Symptoms prior to admission included poor appetite, difficulty sleeping, erratic behavior, anxiety symptoms, anxiety attacks and Inability to make decisions, as well as stuttering  Onset of symptoms was abrupt starting In August of 2022 when he had exacerbation of medical issues:  bright red blood per rectum with Progressively rapidly worsening course since that time  Stressors preceding admission included Medical issues, bills  Mariajose Jaramillo stated that most of this started back in 2014 when he needed open heart surgery for an ascending aortic aneurysm  Patient at that time per the notes admitted in situations that he was going to end his life as opposed to wait for surgery and she was involuntarily committed for “few days”  Other than this, patient has no past inpatient psychiatric history  On initial evaluation after admission to the inpatient psychiatric unit, Mariajose Jaramillo would stutter, repeat himself several times   Patient states that most recently he had bright red blood per rectum in the beginning of August which triggered a cascade of generalized anxiety  Patient states that the following couple of days he started having chest pains and thought that he was having a heart attack and went to the emergency department to take care of this  Additionally patient had been having some head complaints and went to the hospital due to the fact that he has had a stroke before and needed to get a scan  Patient states that “the machine was broken it took like 2 weeks for them to fix it” and this also contributed to his increasing anxiety  Patient was confirmed to not be having a heart attack and states that it had been a panic attack instead  Since this period of time, patient states that he has had increasing feelings of anxiety and worry about everything around him to the point where he can no longer make decisions as simple as going to the grocery store or going to the bathroom  Depression:  Patient states that he does not feel depressed and denies feelings of sadness, guilt, hopelessness, worthlessness  Patient states he has had decreased sleep due to anxiety and he has been feeling “restless”  Patient denies having decreased interest in hobbies also stating “I have no hobbies and “  Patient states he has had a decrease in his appetite and no changes in memory or concentration  Patient denies SI    Hannah:  Patient denies grandiosity, flight of ideas, sleep deficits, pressured speech  Psychosis and paranoia/delusions:    Patient denies feeling that he receives thoughts from others, that people are out to get him  Patient denies AVH  Patient does endorse some disorganized thought process as well as speech as he states that his sister is his Suezanne Person however this is on the context of when asked multiple questions, patient begins to be more anxious and starts stuttering, starts catastrophizing  OCD:  Patient denies compulsions however endorses obsessive thoughts of worry      Patient states that he has a baseline tremor left over from 1 of his strokes however he during this interview and over the last couple of weeks has started to stutter, repeat himself and had difficulty making decisions  Anxiety:  Patient stated that his anxiety started in 2014 when he became very anxious about the prospect of having open heart surgery due to an ascending aortic aneurysm  Patient at that point time spent some time in a psychiatric hospital due to making statements that appeared to be life threatening statements against himself  Patient identifies as a “worrier” and it is clear by the conversation, patient has obsessive thoughts but does not engage in compulsions as related to OCD, patient additionally states that he has many topics that he worries about  When asked about sleep, patient states “well I get worried if I go to sleep too early I will be up by midnight and then I will have nothing to do  I worry about the lights that are on  We have for lighting to keep from falling and it makes my room look like a runway”  Patient is currently ruminating about the fact that he feels “constipated” and states that he would like to go to the bathroom “before utilize block me up”  Patient states that recently he has felt “restless” and that he has felt “on edge” patient states that he is having trouble relaxing, making decisions, worrying about the future, catastrophizing  Patient states that he was given some Ativan for this however he does not like the Ativan  Additionally, patient has been taking Remeron which has not been helping him substantially  Past psychiatric history: This would be patient's 2nd psychiatric hospitalization  Patient saw Dr Zahida Monge in outpatient in September of 2022 for the 1st time and Dr Zahida Monge recommended that he come to the hospital for inpatient psychiatric treatment  No previous outpatient psychiatric treatment    Referring to the medications that we may prescribe him   When asked about previous medications, patient begins to be anxious stating “I can not defend myself”    Social:  Patient lives with his sister  Patient is currently on disability, has 3 years of college, no current legal issues, denies  service, 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 and it is currently “scared”  has no children recently the stressors have included medical complications and finances  Family:  Patient is unsure about her abuse history in his family and states that his sister has anxiety/depression    Substance use history:  Unremarkable    Patient currently denies SI, HI, AVH    Stressors:  • Exacerbation of medical conditions  • A snowballing effect of underlying anxiety  • Financial difficulties    Patient Strengths: average or above intelligence, cooperative, family ties, general fund of knowledge, good support system, motivation for treatment/growth, patient is on a voluntary commitment     Patient Barriers: chronic mental illness, difficulty adapting, medical problems, no/few hobbies or interests, self-care deficit    Psychiatric Review Of Systems:  Sleep changes: decreased patient worries about going to sleep too early, waking up, etc   Appetite changes: no, However has been eating less because of worrying  Weight changes: no  Energy/anergy: Feelings of restlessness feeling on edge  Concentration: no  Interest/pleasure/anhedonia: no  Somatic symptoms: yes, panic attacks but not meeting criteria for panic disorder  Anxiety/panic: Patient although has decreased sleep and then change in energy as well as appetite this is largely due to obsessive nature of worrying, catastrophizing    Patient has had discrete panic attacks in the past however does not meet criteria for panic disorder anxiety additionally seems to be triggered by worsening in medical condition  Hannah: no, Denies  Guilty/hopeless: no  Self injurious behavior/risky behavior: no  Suicidal ideation: no  Homicidal ideation: no  Auditory hallucinations: no  Visual hallucinations: no  Other hallucinations: no  Delusional thinking: No however his worries are of obsessive nature and he remains preoccupied to the point where he is unable to make a decision even when comes to going to the bathroom  His speech is bordering on disorganized due to anxiety  Eating disorder history: no  Obsessive/compulsive symptoms: He admits to obsessive thoughts however does not have compulsive and that would accompany OCD and therefore does not meet criteria  PTSD history:  Patient admits to traumatic experience of open heart surgery however denies nightmares, flashbacks      Historical Information     Past Psychiatric History:   Past Inpatient Psychiatric Treatment:   One previous due to insinuating that he may end his life before open heart surgery  Past Outpatient Psychiatric Treatment:    Currently sees Dr Hanane Merino, for the 1st time this month, no previous  Past Suicide Attempts: no  Past Self-harm Attempts:  None  Past Violent Behavior: no  Access to Firearms: yes, he states these are at his house    His sister has access as well   patient states that they are secured  Past Psychiatric Medication Trials: patient does not remember     Substance Abuse History:  Social History     Tobacco History     Smoking Status  Never Smoker    Smokeless Tobacco Use  Never Used    Tobacco Comment  no second hand smoke exposure          Alcohol History     Alcohol Use Status  Not Currently Comment  very rare in past          Drug Use     Drug Use Status  Never          Sexual Activity     Sexually Active  Not Currently          Activities of Daily Living    Not Asked                 I have assessed this patient for substance use within the past 12 months    Alcohol use: denies use  Recreational drug use:   Cocaine:  denies use  Heroin:  denies use  Marijuana:  denies use  Other drugs: denies use   Longest clean time: not applicable  History of Inpatient/Outpatient rehabilitation program: no  Smoking history: denies use  Use of caffeine: unknown amount    Family Psychiatric History:   Psychiatric Illness:  Sister - Anxiety/depression  Substance Abuse:  unknown  Suicide Attempts:  patient denies    Social History:  Education: some college  Learning Disabilities: none  Marital History:   Children: none  Living Arrangement: Lives at home with sister  Occupational History: Currently on disability, 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, now works very part-time taking care of dogs  Functioning Relationships: Strong relationship with sister, however since he lost his social place in May call [employer] 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 he spent a lot of time a, patient has declined socially  Legal History: none   History: None    Traumatic History:   Abuse: none  Other Traumatic Events: Patient admits that 2014 open heart surgery for the ascending aortic aneurysm was traumatic for him and seems to be the nidus of his anxiety     Past Medical History:  History of Seizures: no  History of Head injury with loss of consciousness: no    Past Medical History:   Diagnosis Date   • Aneurysm of thoracic aorta ([facility] )     last assessed 11/20/17   • Anxiety     currently on no meds   • Arthritis    • Bilateral inguinal hernia    • Cardiac disease    • Cognitive impairment    • CVA (cerebral vascular accident) ([facility] )    • Depression    • GERD (gastroesophageal reflux disease)    • Hearing loss of aging    • Heart disease    • Hyperlipidemia    • Hypertension    • Irritable bowel syndrome    • Kidney stone    • Obesity    • Occasional tremors     left arm since stroke   • Palpitations    • Panic attack    • PONV (postoperative nausea and vomiting)     and headache x 3 days   • Psychiatric illness    • Sciatica     right and left  side   • Shortness of breath     on exertion   • Sleep apnea     is not using a CPAP   • Sleep difficulties    • Spitting up blood 05/21/2021    Resolved   • Status post placement of implantable loop recorder    • Stroke [facility]) 11/2014   • Stroke ([facility] [facility] ) 03/2021   • TIA (transient ischemic attack)        Past Surgical History:   Procedure Laterality Date   • AORTA SURGERY      thoracic - aneurysmorrhaphy   • APPENDECTOMY     • ASCENDING AORTIC ANEURYSM REPAIR      resolved 8/19/15   • CARDIAC LOOP RECORDER     • CHOLECYSTECTOMY      laparoscopic   • COLONOSCOPY     • CYSTOSCOPY      with removal of object- stent removal   • KNEE ARTHROSCOPY Right 1996    with medial meniscus repair   • LITHOTRIPSY      renal   • ORTHOPEDIC SURGERY     • VA FRAGMENT KIDNEY STONE/ ESWL Left 5/17/2018    Procedure: LITHROTRIPSY EXTRACORPORAL SHOCKWAVE (ESWL); Surgeon: Anand Harper MD;  [facility];  Service: Urology   • [facility] Bilateral 12/9/2021    Procedure: ROBOTIC REPAIR HERNIA INGUINAL;  Surgeon: Pawel Tilley MD;  Location: [facility];  Service: General   • THUMB ARTHROSCOPY Right 1976    ligament repair   • UPPER GASTROINTESTINAL ENDOSCOPY         Medical Review Of Systems:  Pertinent items are noted in HPI  Meds/Allergies   all current active meds have been reviewed  Allergies   Allergen Reactions   • Losartan Angioedema   • Aspirin Fatigue     Due to blood thinners     • Bactrim [Sulfamethoxazole-Trimethoprim]    • Eliquis [Apixaban] Other (See Comments)     Failed  Had embolic CVA   • Lisinopril      Felt bad, was OK with Zestril brand name     • Tramadol        Objective   Vital signs in last 24 hours:  Temp:  [97 4 °F (36 3 °C)-97 8 °F (36 6 °C)] 97 8 °F (36 6 °C)  HR:  [] 92  Resp:  [17-18] 17  BP: (147-194)/() 177/105      Intake/Output Summary (Last 24 hours) at 9/30/2022 1104  Last data filed at 9/30/2022 0850  Gross per 24 hour   Intake 180 ml   Output --   Net 180 ml       Mental Status Evaluation:  Appearance:  marginal hygiene, dressed in hospital attire, looks older than stated age, overweight, bearded, Overtly appearing  male, in no apparent acute distress, good eye contact   Behavior:  cooperative, Anxious   Speech:  Normal rate and volume however stutters at times and sometimes repeats himself   Mood:  "Anxious"   Affect:  Anxious, dysphoric at times   Language: repeating phrases at times otherwise WNL   Thought Process:  Ruminating, catastrophizing, bordering on disorganized at times   Associations: circumstantial associations, perseverative   Thought Content:  obsessive thoughts, ruminating thoughts, preoccupied   Perceptual Disturbances: no auditory hallucinations, no visual hallucinations, does not appear responding to internal stimuli   Risk Potential: Suicidal ideation - None at present  Homicidal ideation - None at present  Potential for aggression - Not at present   Sensorium:  oriented to person, place and time/date   Memory:  recent and remote memory grossly intact   Consciousness:  alert and awake   Attention/Concentration: attention span and concentration appear shorter than expected for age   Intellect: within normal limits   Fund of Knowledge: vocabulary: yes   Insight:  partial   Judgment: partial   Muscle Strength Muscle Tone: normal  normal   Gait/Station: slow gait   Motor Activity: Baseline left arm tremor a leftover from stroke     Laboratory Results: I have personally reviewed all pertinent laboratory/tests results    Most Recent Labs:   Lab Results   Component Value Date    WBC 7 50 09/30/2022    RBC 5 37 09/30/2022    HGB 16 4 09/30/2022    HCT 46 4 09/30/2022     09/30/2022    RDW 14 1 09/30/2022    NEUTROABS 5 96 09/30/2022    SODIUM 135 09/30/2022    K 3 6 09/30/2022    CL 99 09/30/2022    CO2 26 09/30/2022    BUN 8 09/30/2022    CREATININE 0 81 09/30/2022    GLUC 125 (H) 09/30/2022    CALCIUM 9 3 09/30/2022    AST 30 09/30/2022    ALT 31 09/30/2022    ALKPHOS 105 09/30/2022    TP 7 5 09/30/2022    ALB 4 5 09/30/2022    TBILI 3 57 (H) 09/30/2022    CHOLESTEROL 120 09/30/2022    HDL 34 (L) 09/30/2022    TRIG 90 09/30/2022    LDLCALC 68 09/30/2022    NONHDLC 86 09/30/2022    EWF3HHDTIYAK 0 260 (L) 09/30/2022    FREET4 1 48 (H) 09/02/2022    T3FREE 2 9 12/08/2014    RPR Non-Reactive (q 11/01/2014    HGBA1C 5 3 05/02/2021     05/02/2021       Imaging Studies: XR chest pa & lateral    Result Date: 9/22/2022  Narrative: CHEST INDICATION:   Anxiety  COMPARISON:  CXR 7/29/2022 and chest CT 8/8/2022  EXAM PERFORMED/VIEWS:  XR CHEST PA & LATERAL FINDINGS: Heart at upper limit of normal in size  Median sternotomy  Loop recorder in the left chest wall  The lungs are clear  No pneumothorax or pleural effusion  Osseous structures appear within normal limits for patient age  Cholecystectomy  Impression: No acute cardiopulmonary disease  Workstation performed: [id number]     Cardiac EP device report    Result Date: 9/21/2022  Narrative: Maria Isabel Sanchez TRANSMISSION: LOOP RECORDER  PRESENTING RHYTHM NSR @ 79 BPM  BATTERY STATUS "OK " 16 DEVICE CLASSIFIED AF EPISODES, AVAILABLE STRIPS DEMONSTRATE NO ATRIAL FIBRILLATION  INSTEAD EGM'S SHOW SR W/ PACs,, PVCs, UNDERSENSING AND OVERSENSING  AF BURDEN IS 0 1%  AF BURDEN MAYBE OVERESTIMATED DUE TO INAPPROPRIATE CLASSIFICATION OF AF  SOME STRIPS MAY NOT BE INTERPRETABLE OR AVAILABLE, CANNOT DEFINITIVELY RULE OUT ATRIAL FIBRILLATION  HX OF PAF  PT TAKES COUMADIN AND COREG  NO PATIENT ACTIVATED EPISODES  NORMAL DEVICE FUNCTION   DL       Code Status: Level 1 - Full Code  Advance Directive and Living Will: <no information>    Suicide/Homicide Risk Assessment:  Risk of Harm to Self:   • The following ratings are based on assessment at the time of the interview  Nursing Suicide Risk Assessment Last 24 hours: C-SSRS Risk (Since Last Contact)  • Calculated C-SSRS Risk Score (Since Last Contact): No Risk Indicated  • Demographic risk factors include: ,  status, male, age: over 48 or older  • Historical Risk Factors include: chronic anxiety symptoms  • Current Specific Risk Factors include: current anxiety symptoms, poor self care, poor reasoning  • Protective Factors: no current suicidal ideation, ability to communicate with staff on the unit, able to contract for safety on the unit, taking medications as ordered on the unit  • Weapons/Firearms: gun  The following steps have been taken to ensure weapons are properly secured: secured  • Based on today's assessment, Melissa Dalal presents the following risk of harm to self: low    Risk of Harm to Others:  • The following ratings are based on assessment at the time of the interview  • Nursing Homicide Risk Assessment: Violence Risk to Others: Denies within past 6 months  • Demographic Risk Factors include: male  • Historical Risk Factors include: none  • Current Specific Risk Factors include: none  • Protective Factors: no current homicidal ideation  • Based on today's assessment, Melissa Dalal presents the following risk of harm to others: minimal      Treatment Plan:     Planned Treatment and Medication Changes: All current active medications have been reviewed  Encourage group therapy, milieu therapy and occupational therapy  Behavioral Health checks every 7 minutes   Medicine to manage medical complaints  Start Abilify 2 mg for anxiety medicine on mentation  Start Zoloft 50 mg daily for anxiety  Continue Ativan at reduced dose 0 25 mg b i d  For 3 days for acute anxiety  Plan to reduce mirtazapine as we titrate Zoloft    Inpatient Psychiatric Certification:     Certification: Based upon physical, mental and social evaluations, I certify that inpatient psychiatric services are medically necessary for this patient for a duration of 7 midnights for the treatment of SHIMON (generalized anxiety disorder)    Available alternative community resources do not meet the patient's mental health care needs    I further attest that an established written individualized plan of care has been implemented and is outlined in the patient's medical records  Santiago Vieira 09/30/22  Psychiatry Resident, PGY-II    This note was completed in part utilizing [employer]  Grammatical, translation, syntax errors, random word insertions, spelling mistakes, and incomplete sentences may be an occasional consequence of this system secondary to software limitations with voice recognition, ambient noise, and hardware issues  If you have any questions or concerns about the content, text, or information contained within the body of this dictation, please contact the provider for clarification

## 2022-09-30 NOTE — CONSULTS
[address] [facility] 1960, 58 y o  male MRN: [id number]  Unit/Bed#: Oscar Beckman 171-20 Encounter: [id number]  Primary Care Provider: Modesto Sutherland MD   Date and time admitted to hospital: 9/28/2022  6:28 PM    Inpatient consult for Medical Clearance for [facility] patient  Consult performed by: Nadia Xiao MD  Consult ordered by: Isis Jean-Baptiste MD          Medical clearance for incarceration  Assessment & Plan  Patient is medically cleared for admission to the [facility] for treatment of the underlying psychiatric illness  SLIM will sign off - please call with questions or concerns    PVD (peripheral vascular disease) ([facility] )  Assessment & Plan  · On lasix for lower extremity edmea  · Continue Lasix 20mg daily along with potassium 20mEq daily    Antiphospholipid antibody with hypercoagulable state ([facility] )  Assessment & Plan  · Follows with Heme/Onc  · Anticoagulated with Warfarin 4mg daily  · INR on 09/28/22 at 3 14, Warfarin was held on 09/28 and 09/29  · Continue with Warfarin 4mg daily  Check INR today  · Continue to Monitor INR while inpatient - goal 2 0 -3 0    History of stroke  Assessment & Plan  · Patient with history of multiple strokes  · Anticoagulated with warfarin  · Most recent MRI brain on 08/29/22 shows "Microangiopathic and remote postischemic change as noted without acute intracranial abnormality noted  · Continue warfarin    Atrial fibrillation [facility])  Assessment & Plan  · Post operative  No documented atrial fibrillation since 11/06/14  · Yesterdays EKG shows normal sinus rhythm  · On carvedilol 12 5mg bid for HTN and warfarin 4mg daily for antiphospholipid syndrome  · Continue while inpatient    GERD (gastroesophageal reflux disease)  Assessment & Plan  · Continue pre hospital pantoprazole 40mg bid    Hypertension  Assessment & Plan  · BP labile   Per chart review, it appears that BP is well controlled on Carvediol, recent BP elevations are likely 2/2 to anxiety  · Continue pre hospital carvedilol 12 5mg bid  · Continue to monitor VS per unit protocol      ECT Clearance:  • History of recent seizure or stroke:  No  • History of pheochromocytoma:  No  • History of active bleeding (Intracranial hemorrhage, aneurysm or AVM):  No  • History of metallic implants in the head or neck:  No  • History of increased intracranial pressure with mass effect:  No  ·   EKG within 3 months? Yes - 09/29/20  o If yes, was an arrhythmia present and at baseline? No arrhythmia  o If yes, what is the baseline QT/QTc interval? 364/447  o If no, obtain prior to ECT for arrhythmia evaluation and baseline QT interval     Based on above criteria, Patient is medically cleared for ECT should it be recommended  Counseling / Coordination of Care Time: 45 minutes  Greater than 50% of total time spent on patient counseling and coordination of care  Collaboration of Care: Were Recommendations Directly Discussed with Primary Treatment Team? - No     History of Present Illness:    Vanessa Daly is a 58 y o  male who is originally admitted to the psychiatry service due to anxiety  We are consulted for medical clearance for admission to [address] [place] [address] and treatment of underlying psychiatric illness  This patient has a past medical history significant for multiple strokes, s/p loop recorder implantation, HTN, antiphospholipid syndrome, GERD, PVD  Per hcart review, he was sent to the ED by his private psychiatrist for worsening anxiety and panic attacks  Review of Systems:    Review of Systems Patient was seen and examined at bedside  The patient stated he has back pain but denies any neck injury, headache, blurry vision, chest pain, palpitation, shortness of breath, N/V, abd pain        Past Medical and Surgical History:     Past Medical History:   Diagnosis Date   • Aneurysm of thoracic aorta ([address] )     last assessed 11/20/17   • Anxiety     currently on no meds   • Arthritis • Bilateral inguinal hernia    • Cardiac disease    • Cognitive impairment    • CVA (cerebral vascular accident) ([facility] )    • Depression    • GERD (gastroesophageal reflux disease)    • Hearing loss of aging    • Heart disease    • Hyperlipidemia    • Hypertension    • Irritable bowel syndrome    • Kidney stone    • Obesity    • Occasional tremors     left arm since stroke   • Palpitations    • Panic attack    • PONV (postoperative nausea and vomiting)     and headache x 3 days   • Psychiatric illness    • Sciatica     right and left  side   • Shortness of breath     on exertion   • Sleep apnea     is not using a CPAP   • Sleep difficulties    • Spitting up blood 05/21/2021    Resolved   • Status post placement of implantable loop recorder    • Stroke ([facility] ) 11/2014   • St[facility] ) 03/2021   • TIA (transient ischemic attack)        Past Surgical History:   Procedure Laterality Date   • AORTA SURGERY      thoracic - aneurysmorrhaphy   • APPENDECTOMY     • ASCENDING AORTIC ANEURYSM REPAIR      resolved 8/19/15   • CARDIAC LOOP RECORDER     • CHOLECYSTECTOMY      laparoscopic   • COLONOSCOPY     • CYSTOSCOPY      with removal of object- stent removal   • KNEE ARTHROSCOPY Right 1996    with medial meniscus repair   • LITHOTRIPSY      renal   • ORTHOPEDIC SURGERY     • [place] FRAGMENT KIDNEY STONE/ ESWL Left 5/17/2018    Procedure: LITHROTRIPSY EXTRACORPORAL SHOCKWAVE (ESWL); Surgeon: Jaun Barnes MD;  Location: [facility];  Service: Urology   • [place] [facility] [address] Bilateral 12/9/2021    Procedure: ROBOTIC REPAIR HERNIA INGUINAL;  Surgeon: Charlie Jensen MD;  Location: [facility];  Service: General   • THUMB ARTHROSCOPY Right 1976    ligament repair   • UPPER GASTROINTESTINAL ENDOSCOPY         Meds/Allergies:    all medications and allergies reviewed    Allergies:    Allergies   Allergen Reactions   • Losartan Angioedema   • Aspirin Fatigue     Due to blood thinners     • Bactrim [Sulfamethoxazole-Trimethoprim]    • Eliquis [Apixaban] Other (See Comments)     Failed  Had embolic CVA   • Lisinopril      Felt bad, was OK with Zestril brand name  • Tramadol        Social History:     Marital Status:     Substance Use History:   Social History     Substance and Sexual Activity   Alcohol Use Not Currently    Comment: very rare in past     Social History     Tobacco Use   Smoking Status Never Smoker   Smokeless Tobacco Never Used   Tobacco Comment    no second hand smoke exposure     Social History     Substance and Sexual Activity   Drug Use Never       Family History:    non-contributory    Physical Exam:     Vitals:   Blood Pressure: (!) 177/105 (09/30/22 0745)  Pulse: 92 (09/30/22 0745)  Temperature: 97 8 °F (36 6 °C) (09/30/22 0745)  Temp Source: Temporal (09/30/22 0745)  Respirations: 17 (09/30/22 0745)  Height: 5' 8" (172 7 cm) (09/29/22 1430)  Weight - Scale: 91 6 kg (202 lb) (09/29/22 1430)  SpO2: 96 % (09/30/22 0745)    Physical Exam   General: breathing well on room air, no acute distress  HEENT: NC/AT, PERRL, EOM - normal  Neck: Supple  Pulm/Chest: Normal chest wall expansion, clear breath sounds on both side, no wheezing/rhonchi or crackles appreciated  CVS: RRR, normal S1&S2, no murmur appreciated, capillary refill <2s  Abd: soft, non tender, non distended, bowel sounds +  MSK: move all 4 extremities spontaneously  Skin: warm  CNS: no acute focal neuro deficit        Additional Data:     Lab Results: I have personally reviewed pertinent reports        Results from last 7 days   Lab Units 09/30/22 0714   WBC Thousand/uL 7 50   HEMOGLOBIN g/dL 16 4   HEMATOCRIT % 46 4   PLATELETS Thousands/uL 185   NEUTROS PCT % 80*   LYMPHS PCT % 11*   MONOS PCT % 8   EOS PCT % 1     Results from last 7 days   Lab Units 09/30/22  0714   SODIUM mmol/L 135   POTASSIUM mmol/L 3 6   CHLORIDE mmol/L 99   CO2 mmol/L 26   BUN mg/dL 8   CREATININE mg/dL 0 81   ANION GAP mmol/L 10   CALCIUM mg/dL 9 3 ALBUMIN g/dL 4 5   TOTAL BILIRUBIN mg/dL 3 57*   ALK PHOS U/L 105   ALT U/L 31   AST U/L 30   GLUCOSE RANDOM mg/dL 125*     Results from last 7 days   Lab Units 22  0714   INR  1 67*         Lab Results   Component Value Date/Time    HGBA1C 5 3 2021 05:58 AM    HGBA1C 5 5 2020 05:58 AM    HGBA1C 5 3 2017 05:50 AM    HGBA1C 5 4 2014 10:55 AM           EKG, Pathology, and Other Studies Reviewed on Admission:   · EK22  NSR - Inferior infarct, age undetermined     ** Please Note: This note has been constructed using a voice recognition system   **

## 2022-09-30 NOTE — TREATMENT PLAN
TREATMENT PLAN REVIEW - Jose Diego 58 y o  1960 male MRN: [id number]    [address] [place] [address] Room / Bed: Rosemarie Mccallum [facility] Encounter: [id number]        Admit Date/Time:  9/28/2022  6:28 PM    Treatment Team: Attending Provider: Teresa Toure MD; : Maty Marin; Patient Care Assistant: Harmony Mcfadden; Patient Care Assistant: Saurav Sandoval; Registered Nurse: Cabrera Peralta; Nurse Manager: Faye Armenta RN; Resident: Foster Huber DO    Diagnosis: Principal Problem:    SHIMON (generalized anxiety disorder)  Active Problems:    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 attack    Hypertension    GERD (gastroesophageal reflux disease)    Atrial fibrillation ([place] [address] )    Class 2 obesity in adult    History of stroke    Antiphospholipid antibody with hypercoagulable state ([place] [address] )    Hyperbilirubinemia    CORIE (obstructive sleep apnea)    PVD (peripheral vascular disease) ([place] [address] )    Medical clearance for incarceration      Patient Strengths/Assets: ability for insight, average or above intelligence, cooperative, compliant with medication, stable housing, supportive family      Patient Barriers/Limitations: chronic pain, chronic mental illness, low self esteem, medical problems, no/few hobbies or interests, poor physical health    Short Term Goals: decrease in anxiety symptoms, ability to stay safe on the unit, improvement in ability to express basic needs, improvement in insight, sleep improvement, improvement in appetite, tolerate medications    Long Term Goals: improvement in anxiety, improved reality testing, improved reasoning ability, improved insight, adequate self care, adequate sleep, adequate appetite, adequate oral intake    Progress Towards Goals: starting psychiatric medications as prescribed, progressing slowly, attends groups more often, attends to more to ADLs, less anxious    Recommended Treatment: medication management, patient medication education, group therapy, milieu therapy, continued Behavioral Health psychiatric evaluation/assessment process     Treatment Frequency: daily medication monitoring, group and milieu therapy daily, monitoring through interdisciplinary rounds, monitoring through weekly patient care conferences    Expected Discharge Date: 7 days - 10/7/2022    Discharge Plan: discharge to home, referrals as indicated    Treatment Plan Created/Updated By: Jose Sen

## 2022-10-01 LAB
INR PPP: 1.59 (ref 0.84–1.19)
PROTHROMBIN TIME: 19.3 SECONDS (ref 11.6–14.5)

## 2022-10-01 PROCEDURE — 99232 SBSQ HOSP IP/OBS MODERATE 35: CPT | Performed by: STUDENT IN AN ORGANIZED HEALTH CARE EDUCATION/TRAINING PROGRAM

## 2022-10-01 PROCEDURE — 85610 PROTHROMBIN TIME: CPT | Performed by: PHYSICIAN ASSISTANT

## 2022-10-01 RX ORDER — WARFARIN SODIUM 2 MG/1
4 TABLET ORAL
Status: DISCONTINUED | OUTPATIENT
Start: 2022-10-02 | End: 2022-10-11

## 2022-10-01 RX ORDER — LEVOTHYROXINE SODIUM 0.07 MG/1
75 TABLET ORAL
Status: DISCONTINUED | OUTPATIENT
Start: 2022-10-02 | End: 2022-10-01

## 2022-10-01 RX ORDER — LORAZEPAM 0.5 MG/1
0.25 TABLET ORAL EVERY 8 HOURS SCHEDULED
Status: DISCONTINUED | OUTPATIENT
Start: 2022-10-01 | End: 2022-10-03

## 2022-10-01 RX ORDER — WARFARIN SODIUM 6 MG/1
6 TABLET ORAL
Status: COMPLETED | OUTPATIENT
Start: 2022-10-01 | End: 2022-10-01

## 2022-10-01 RX ORDER — LEVOTHYROXINE SODIUM 0.05 MG/1
50 TABLET ORAL
Status: DISCONTINUED | OUTPATIENT
Start: 2022-10-02 | End: 2022-10-04

## 2022-10-01 RX ADMIN — LORAZEPAM 0.25 MG: 0.5 TABLET ORAL at 22:06

## 2022-10-01 RX ADMIN — ARIPIPRAZOLE 2 MG: 2 TABLET ORAL at 08:55

## 2022-10-01 RX ADMIN — PANTOPRAZOLE SODIUM 40 MG: 40 TABLET, DELAYED RELEASE ORAL at 21:18

## 2022-10-01 RX ADMIN — LORAZEPAM 0.25 MG: 0.5 TABLET ORAL at 17:26

## 2022-10-01 RX ADMIN — FAMOTIDINE 40 MG: 20 TABLET ORAL at 17:24

## 2022-10-01 RX ADMIN — PANTOPRAZOLE SODIUM 40 MG: 40 TABLET, DELAYED RELEASE ORAL at 08:55

## 2022-10-01 RX ADMIN — ATORVASTATIN CALCIUM 40 MG: 40 TABLET, FILM COATED ORAL at 17:25

## 2022-10-01 RX ADMIN — MIRTAZAPINE 22.5 MG: 7.5 TABLET, FILM COATED ORAL at 22:05

## 2022-10-01 RX ADMIN — SERTRALINE HYDROCHLORIDE 50 MG: 50 TABLET ORAL at 08:55

## 2022-10-01 RX ADMIN — CARVEDILOL 12.5 MG: 12.5 TABLET, FILM COATED ORAL at 17:25

## 2022-10-01 RX ADMIN — POTASSIUM CHLORIDE 20 MEQ: 1500 TABLET, EXTENDED RELEASE ORAL at 08:52

## 2022-10-01 RX ADMIN — FUROSEMIDE 20 MG: 40 TABLET ORAL at 08:56

## 2022-10-01 RX ADMIN — FAMOTIDINE 40 MG: 20 TABLET ORAL at 08:55

## 2022-10-01 RX ADMIN — WARFARIN SODIUM 6 MG: 6 TABLET ORAL at 17:24

## 2022-10-01 RX ADMIN — CARVEDILOL 12.5 MG: 12.5 TABLET, FILM COATED ORAL at 08:56

## 2022-10-01 RX ADMIN — LORAZEPAM 0.25 MG: 0.5 TABLET ORAL at 08:52

## 2022-10-01 RX ADMIN — MELATONIN TAB 3 MG 3 MG: 3 TAB at 22:07

## 2022-10-01 NOTE — PLAN OF CARE
Problem: Anxiety  Goal: Anxiety is at manageable level  Description: Interventions:  - Assess and monitor patient's anxiety level  - Monitor for signs and symptoms (heart palpitations, chest pain, shortness of breath, headaches, nausea, feeling jumpy, restlessness, irritable, apprehensive)  - Collaborate with interdisciplinary team and initiate plan and interventions as ordered    - [place] patient to unit/surroundings  - Explain treatment plan  - Encourage participation in care  - Encourage verbalization of concerns/fears  - Identify coping mechanisms  - Assist in developing anxiety-reducing skills  - Administer/offer alternative therapies  - Limit or eliminate stimulants  Outcome: Progressing

## 2022-10-01 NOTE — QUICK NOTE
Patient warfarin was initially on hold due to supratherapeutic INR  Patient INR was 1 56 on 9/30 and he received 1 dose of warfarin 5 mg  Today INR is 1 59  Will give 1 dose of warfarin 6 mg today  Follow-up INR tomorrow morning  TSH is 0 26, will start Synthroid 50 mcg and recheck TSH in 4-6 weeks

## 2022-10-01 NOTE — PROGRESS NOTES
Progress Note - 2625 Neha Moses 58 y o  male MRN: [id number]  Unit/Bed#: Stephane Moody 096-95 Encounter: [id number]       Patient was visited on unit for continuing care; chart reviewed and discussed with the nursing staff  On approach, the patient was calm and cooperative  Presented w/ anxious mood, preoccupied with his medication, and reported being extremely anxious since receiving lower dose of Ativan in the hospital  PMDP checked and the patient was last prescribed Ativan 0 5 mg TID and was started on Ativan 0 25 BID which increased to TID  The patient reported poor sleep last night, and received Trazodone 50 mg at 2336  He reported feeling "confused" and anxious when woke up this morning, and reported VH of seeing "dogs at the bottom of table" and described as seeing a Corgi and a Beagle  Denied AH  Denied any changes in his appetite, and energy level  Denied SI/HI, intent or plan upon direct inquiry at this time  Patient continues to be visible with minimal interactions with staff and peers, demanding at times, and preoccupied with meds and somatic complaints  No reports of aggression or self-injurious behavior on unit  No PRN medications used in the past 24 hours  Patient accepted all offered medications and reported feeling better  No adverse effects of medications noted or reported  Increased Ativan 0 25 mg BID to TID; to be tapered off as indicated subsequently         Current Mental Status Evaluation:  Appearance and attitude: appeared as stated age, dressed in hospital attire, with fair hygiene  Eye contact: fair  Motor Function: No PMA/PMR, tremors in L side  Gait/station: Not observed  Speech: normal for rate, rhythm, volume, latency, amount  Language: No overt abnormality  Mood/affect: anxious / Affect was constricted, hyper-intense, mood-congruent  Thought Processes: ruminating  Thought content: denied suicidal ideations or homicidal ideations, ruminating thoughts, preoccupied with meds  Associations: perseverative  Perceptual disturbances: no auditory hallucinations, visual hallucinations  Orientation: oriented to time, person, place and to the situational context  Cognitive Function: intact  Memory: recent and remote memory grossly intact  Intellect: average  Fund of knowledge: aware of current events, aware of past history and vocabulary average  Impulse control: good  Insight/judgment: fair/fair    Vital signs in last 24 hours:    Temp:  [96 5 °F (35 8 °C)-98 °F (36 7 °C)] 98 °F (36 7 °C)  HR:  [] 71  Resp:  [16-17] 17  BP: (154-193)/() 154/96    Laboratory results: I have personally reviewed all pertinent laboratory/tests results    Results from the past 24 hours:   Recent Results (from the past 24 hour(s))   Protime-INR    Collection Time: 09/30/22  1:26 PM   Result Value Ref Range    Protime 19 0 (H) 11 6 - 14 5 seconds    INR 1 56 (H) 0 84 - 1 19   Protime-INR    Collection Time: 10/01/22  6:02 AM   Result Value Ref Range    Protime 19 3 (H) 11 6 - 14 5 seconds    INR 1 59 (H) 0 84 - 1 19       Progress Toward Goals: no significant improvement    Assessment:  Principal Problem:    SHIMON (generalized anxiety disorder)  Active Problems:    Hypertension    GERD (gastroesophageal reflux disease)    Atrial fibrillation (HCC)    Class 2 obesity in adult    History of stroke    Antiphospholipid antibody with hypercoagulable state (HCC)    Hyperbilirubinemia    CORIE (obstructive sleep apnea)    PVD (peripheral vascular disease) ([facility])    Medical clearance for incarceration    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 attack        Plan:  - f/u SLIM recs regarding the medical problems  - Continue medication titration and treatment plan; adjust medication to optimize treatment response and as clinically indicated       Scheduled medications:  Current Facility-Administered Medications   Medication Dose Route Frequency Provider Last Rate   • acetaminophen  650 mg Oral Q4H PRN Isis Jean-Baptiste MD     • acetaminophen  650 mg Oral Q4H PRN Isis Jean-Baptiste MD     • acetaminophen  975 mg Oral Q6H PRN Isis Jean-Baptiste MD     • aluminum-magnesium hydroxide-simethicone  30 mL Oral Q4H PRN Isis Jean-Baptiste MD     • ARIPiprazole  2 mg Oral Daily Evalene Patella, DO     • atorvastatin  40 mg Oral Daily With Keely Cha MD     • benztropine  1 mg Intramuscular Q4H PRN Max 6/day Isis Jean-Baptiste MD     • benztropine  0 5 mg Oral Q4H PRN Max 6/day Isis Jean-Baptiste MD     • carvedilol  12 5 mg Oral BID With Meals Isis Jean-Baptiste MD     • famotidine  40 mg Oral BID Tyra Whitfield PA-C     • furosemide  20 mg Oral Daily Isis Jean-Baptiste MD     • haloperidol lactate  5 mg Intramuscular Q4H PRN Max 4/day Isis Jean-Baptiste MD     • hydrOXYzine HCL  25 mg Oral Q6H PRN Max 4/day Isis Jean-Baptiste MD     • LORazepam  1 mg Intramuscular Q6H PRN Max 3/day Isis Jean-Baptiste MD     • LORazepam  0 25 mg Oral [facility] Meño Singh MD     • melatonin  3 mg Oral HS Isis Jean-Baptiste MD     • mirtazapine  22 5 mg Oral HS Isis Jean-Baptiste MD     • pantoprazole  40 mg Oral BID Tyra Whitfield PA-C     • polyethylene glycol  17 g Oral Daily PRN Isis eJan-Baptiste MD     • potassium chloride  20 mEq Oral Daily Isis Jean-Baptiste MD     • propranolol  5 mg Oral Q8H PRN Isis Jean-Baptiste MD     • risperiDONE  0 25 mg Oral Q4H PRN Max 6/day Isis Jean-Baptiste MD     • risperiDONE  0 5 mg Oral Q4H PRN Max 3/day Isis Jean-Baptiste MD     • risperiDONE  1 mg Oral Q2H PRN Max 3/day Isis Jean-Baptiste MD     • senna-docusate sodium  1 tablet Oral Daily PRN Isis Jean-Baptiste MD     • sertraline  50 mg Oral Daily Evalene Patella, DO     • traZODone  50 mg Oral Q6H PRN Max 3/day Isis Jean-Baptiste MD     • traZODone  50 mg Oral HS PRN Isis Jean-Baptiste MD     • warfarin  4 mg Oral Daily (warfarin) Tyra Whitfield PA-C          PRN:  •  acetaminophen  •  acetaminophen  •  acetaminophen  • aluminum-magnesium hydroxide-simethicone  •  benztropine  •  benztropine  •  haloperidol lactate  •  hydrOXYzine HCL  •  LORazepam  •  polyethylene glycol  •  propranolol  •  risperiDONE  •  risperiDONE  •  risperiDONE  •  senna-docusate sodium  •  traZODone  •  traZODone    - Observation: routine    - VS: as per unit protocol  - Diet: Regular diet  - Psychoeducation (benefits and potential risks) discussed, importance of compliance with the psychiatric treatment reiterated, and the patient verbalized understanding of the matter  - Encourage group attendance and milieu therapy    - The pt was educated and agreed to verbalize any suicidal thoughts, frustrations or concerns to the nursing staff, immediately  - Dispo: TBD       Next of Kin  Extended Emergency Contact Information  Primary Emergency Contact: Le Harper  Address: [address] [facility] [facility], [place] [address] [address] Phone: [phone]  Mobile Phone: [phone]  Relation: Sister      Counseling / Coordination of Care     Total floor / unit time spent today 20 minutes  Greater than 50% of total time was spent with the patient and / or family counseling and / or coordination of care  A description of the counseling / coordination of care  Patient's Rights, confidentiality and exceptions to confidentiality, use of automated medical record, [address] [place] [address] staff access to medical record, and consent to treatment reviewed      True Hope MD  Attending JAGJIT Shin 701

## 2022-10-01 NOTE — NURSING NOTE
Patient was visible in the milieu with minimal peer interaction  Rate anxiety 2/4, depression 3/10, denies all other psych s/s but c/o back pain 5/10, tylenol 650 mg given at 1819 and it was moderately effective  Patient is very needy and repetitive with his questions despite the same answers had been given to him  Had 0% for dinner  Compliant with all his medications after answering numerous questions concerning his medications  Trazodone 50 mg given at 2336 per patient's request  Safety checks ongoing

## 2022-10-01 NOTE — NURSING NOTE
Pt appeared anxious in a m, appeared overwhelmed and preoccupied while taking medications  Pt responded well to reassurances  Medication compliant  Pt appeared calm during shift, watching tv with no sign of anxiety  Pt concerned that he will have difficulty sleeping, reassured that prn can be given

## 2022-10-02 LAB
1,25(OH)2D3 SERPL-MCNC: 57.7 PG/ML (ref 24.8–81.5)
INR PPP: 2.09 (ref 0.84–1.19)
PROTHROMBIN TIME: 23.9 SECONDS (ref 11.6–14.5)

## 2022-10-02 PROCEDURE — 99232 SBSQ HOSP IP/OBS MODERATE 35: CPT | Performed by: STUDENT IN AN ORGANIZED HEALTH CARE EDUCATION/TRAINING PROGRAM

## 2022-10-02 PROCEDURE — 85610 PROTHROMBIN TIME: CPT | Performed by: INTERNAL MEDICINE

## 2022-10-02 RX ADMIN — TRAZODONE HYDROCHLORIDE 50 MG: 50 TABLET ORAL at 21:09

## 2022-10-02 RX ADMIN — LEVOTHYROXINE SODIUM 50 MCG: 50 TABLET ORAL at 06:15

## 2022-10-02 RX ADMIN — WARFARIN SODIUM 4 MG: 2 TABLET ORAL at 17:06

## 2022-10-02 RX ADMIN — PANTOPRAZOLE SODIUM 40 MG: 40 TABLET, DELAYED RELEASE ORAL at 08:51

## 2022-10-02 RX ADMIN — LORAZEPAM 0.25 MG: 0.5 TABLET ORAL at 16:07

## 2022-10-02 RX ADMIN — CARVEDILOL 12.5 MG: 12.5 TABLET, FILM COATED ORAL at 08:50

## 2022-10-02 RX ADMIN — PANTOPRAZOLE SODIUM 40 MG: 40 TABLET, DELAYED RELEASE ORAL at 21:09

## 2022-10-02 RX ADMIN — MIRTAZAPINE 22.5 MG: 7.5 TABLET, FILM COATED ORAL at 21:08

## 2022-10-02 RX ADMIN — POTASSIUM CHLORIDE 20 MEQ: 1500 TABLET, EXTENDED RELEASE ORAL at 08:51

## 2022-10-02 RX ADMIN — FAMOTIDINE 40 MG: 20 TABLET ORAL at 17:07

## 2022-10-02 RX ADMIN — MELATONIN TAB 3 MG 3 MG: 3 TAB at 21:09

## 2022-10-02 RX ADMIN — ATORVASTATIN CALCIUM 40 MG: 40 TABLET, FILM COATED ORAL at 17:07

## 2022-10-02 RX ADMIN — CARVEDILOL 12.5 MG: 12.5 TABLET, FILM COATED ORAL at 17:07

## 2022-10-02 RX ADMIN — ARIPIPRAZOLE 2 MG: 2 TABLET ORAL at 08:47

## 2022-10-02 RX ADMIN — HYDROXYZINE HYDROCHLORIDE 25 MG: 25 TABLET, FILM COATED ORAL at 12:57

## 2022-10-02 RX ADMIN — LORAZEPAM 0.25 MG: 0.5 TABLET ORAL at 21:10

## 2022-10-02 RX ADMIN — ACETAMINOPHEN 650 MG: 325 TABLET ORAL at 21:23

## 2022-10-02 RX ADMIN — SERTRALINE HYDROCHLORIDE 50 MG: 50 TABLET ORAL at 08:51

## 2022-10-02 RX ADMIN — FUROSEMIDE 20 MG: 40 TABLET ORAL at 08:48

## 2022-10-02 RX ADMIN — LORAZEPAM 0.25 MG: 0.5 TABLET ORAL at 08:47

## 2022-10-02 RX ADMIN — FAMOTIDINE 40 MG: 20 TABLET ORAL at 08:51

## 2022-10-02 NOTE — PROGRESS NOTES
Progress Note - 2625 Neha Moses 58 y o  male MRN: [id number]  Unit/Bed#: [facility] Daniel Alegre 981-58 Encounter: [id number]       Patient was visited on unit for continuing care; chart reviewed and discussed with the nursing staff  On approach, the patient was calm and cooperative  Continues to report anxiety sxs  Admitted better sleep and slight decrease of anxiety in the evening since Ativan was resumed to TID  Denied any changes in mood, appetite, and energy level  No problem initiating and maintaining sleep  Denied A/VH currently  Denied SI/HI, intent or plan upon direct inquiry at this time  Patient continues to be visible with selective interactions with staff and peers  No reports of aggression or self-injurious behavior on unit  No PRN medications used in the past 24 hours  Patient accepted all offered medications and reported feeling better  No adverse effects of medications noted or reported  May consider uptitrating Zoloft as indicated subsequently         Current Mental Status Evaluation:  Appearance and attitude: appeared as stated age, dressed in hospital attire, with fair hygiene  Eye contact: good  Motor Function: No PMA/PMR, b/l tremors in hands visible  Gait/station: Not observed  Speech: normal for rate, rhythm, volume, and latency with decreased amount  Language: No overt abnormality  Mood/affect: anxious / Affect was constricted, hyper-intense, mood-congruent  Thought Processes: linear, concrete, perseverative  Thought content: denied suicidal ideations or homicidal ideations, no overt delusions elicited, ruminating thoughts  Associations: concrete associations, perseveration  Perceptual disturbances: denies Auditory/Visual/Tactile Hallucinations  Orientation: oriented to time, person, place and to the situational context  Cognitive Function: intact  Memory: not cooperative with formal MMSE  Intellect: unable to assess  Fund of knowledge: aware of current events, aware of past history and vocabulary average  Impulse control: good  Insight/judgment: fair/fair    Vital signs in last 24 hours:    Temp:  [97 6 °F (36 4 °C)-97 9 °F (36 6 °C)] 97 6 °F (36 4 °C)  HR:  [] 100  Resp:  [18-19] 18  BP: (147-166)/(86-96) 156/96    Laboratory results: I have personally reviewed all pertinent laboratory/tests results    Results from the past 24 hours:   Recent Results (from the past 24 hour(s))   Protime-INR    Collection Time: 10/02/22  6:06 AM   Result Value Ref Range    Protime 23 9 (H) 11 6 - 14 5 seconds    INR 2 09 (H) 0 84 - 1 19       Progress Toward Goals: improving slowly    Assessment:  Principal Problem:    SHIMON (generalized anxiety disorder)  Active Problems:    Hypertension    GERD (gastroesophageal reflux disease)    Atrial fibrillation (HCC)    Class 2 obesity in adult    History of stroke    Antiphospholipid antibody with hypercoagulable state (HCC)    Hyperbilirubinemia    CORIE (obstructive sleep apnea)    PVD (peripheral vascular disease) ([facility])    Medical clearance for incarceration    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 attack        Plan:  - f/u [facility] recs regarding the medical problems  - Continue medication titration and treatment plan; adjust medication to optimize treatment response and as clinically indicated       Scheduled medications:  Current Facility-Administered Medications   Medication Dose Route Frequency Provider Last Rate   • acetaminophen  650 mg Oral Q4H PRN Barry Dougherty MD     • acetaminophen  650 mg Oral Q4H PRN Barry Dougherty MD     • acetaminophen  975 mg Oral Q6H PRN Barry Dougherty MD     • aluminum-magnesium hydroxide-simethicone  30 mL Oral Q4H PRN Barry Dougherty MD     • ARIPiprazole  2 mg Oral Daily Debra Casey DO     • atorvastatin  40 mg Oral Daily With Ileana Medrano MD     • benztropine  1 mg Intramuscular Q4H PRN Max 6/day Barry Dougherty MD     • benztropine  0 5 mg Oral Q4H PRN Max 6/day Marisel Chris MD     • carvedilol  12 5 mg Oral BID With Meals Marisel Chris MD     • famotidine  40 mg Oral BID Jamir Candelario PA-C     • furosemide  20 mg Oral Daily Carola Skippy Kawasaki, MD     • haloperidol lactate  5 mg Intramuscular Q4H PRN Max 4/day Marisel Chris MD     • hydrOXYzine HCL  25 mg Oral Q6H PRN Max 4/day Marisel Chris MD     • levothyroxine  50 mcg Oral Early Morning Meño Eng MD     • LORazepam  1 mg Intramuscular Q6H PRN Max 3/day Marisel Chris MD     • LORazepam  0 25 mg Oral [facility] Juan Bhat MD     • melatonin  3 mg Oral HS Marisel Chris MD     • mirtazapine  22 5 mg Oral HS Marisel Chris MD     • pantoprazole  40 mg Oral BID Jamir Candelario PA-C     • polyethylene glycol  17 g Oral Daily PRN Marisel Chris MD     • potassium chloride  20 mEq Oral Daily Carola Skippy Kawasaki, MD     • propranolol  5 mg Oral Q8H PRN Marisel Chris MD     • risperiDONE  0 25 mg Oral Q4H PRN Max 6/day Carola Skippy Kawasaki, MD     • risperiDONE  0 5 mg Oral Q4H PRN Max 3/day Marisel Chris MD     • risperiDONE  1 mg Oral Q2H PRN Max 3/day Marisel Chris MD     • senna-docusate sodium  1 tablet Oral Daily PRN Marisel Chris MD     • sertraline  50 mg Oral Daily Romario Gutierrez DO     • traZODone  50 mg Oral Q6H PRN Max 3/day Marisel Chris MD     • traZODone  50 mg Oral HS PRN Marisel Chris MD     • warfarin  4 mg Oral Daily (warfarin) Meño Eng MD          PRN:  •  acetaminophen  •  acetaminophen  •  acetaminophen  •  aluminum-magnesium hydroxide-simethicone  •  benztropine  •  benztropine  •  haloperidol lactate  •  hydrOXYzine HCL  •  LORazepam  •  polyethylene glycol  •  propranolol  •  risperiDONE  •  risperiDONE  •  risperiDONE  •  senna-docusate sodium  •  traZODone  •  traZODone    - Observation: routine    - VS: as per unit protocol  - Diet: Regular diet  - Psychoeducation (benefits and potential risks) discussed, importance of compliance with the psychiatric treatment reiterated, and the patient verbalized understanding of the matter  - Encourage group attendance and milieu therapy    - The pt was educated and agreed to verbalize any suicidal thoughts, frustrations or concerns to the nursing staff, immediately  - Dispo: TBD       Next of Kin  · Extended Emergency Contact Information  · Primary Emergency Contact: Yonathan Harperon  · Address: [address] [place] [address]  ·          [place], [place] [address] [place]  · Home Phone: [phone]  · Mobile Phone: [phone]  · Relation: Sister      Counseling / Coordination of Care     Total floor / unit time spent today 20 minutes  Greater than 50% of total time was spent with the patient and / or family counseling and / or coordination of care  A description of the counseling / coordination of care  Patient's Rights, confidentiality and exceptions to confidentiality, use of automated medical record, [address] [place] [address] staff access to medical record, and consent to treatment reviewed      Lissa Cui MD  Attending [address]

## 2022-10-02 NOTE — NURSING NOTE
Patient alert, visible in milieu  Denies pain/discomfortt, denies anxiety, depression, SI/HI and AH/VH  Compliant with medication  No other issues observed at this time  Q 7 min safety checks ongoing

## 2022-10-02 NOTE — PLAN OF CARE
Problem: Anxiety  Goal: Anxiety is at manageable level  Description: Interventions:  - Assess and monitor patient's anxiety level  - Monitor for signs and symptoms (heart palpitations, chest pain, shortness of breath, headaches, nausea, feeling jumpy, restlessness, irritable, apprehensive)  - Collaborate with interdisciplinary team and initiate plan and interventions as ordered    - [place] patient to unit/surroundings  - Explain treatment plan  - Encourage participation in care  - Encourage verbalization of concerns/fears  - Identify coping mechanisms  - Assist in developing anxiety-reducing skills  - Administer/offer alternative therapies  - Limit or eliminate stimulants  Outcome: Progressing     Problem: Ineffective Coping  Goal: Identifies healthy coping skills  Outcome: Progressing     Problem: Ineffective Coping  Goal: Demonstrates healthy coping skills  Outcome: Progressing

## 2022-10-02 NOTE — NURSING NOTE
Pt visible on unit, cooperative, makes needs known  Displays increased anxiety during verbal interactions, pt appears calmer while sitting in dayroom  Pt continues to perseverate over timing of medications, reassured  Pt reported "not doing good" with anxiety 3/4  Accepted atarax prn, partially effective  Pt denies depression or SI currently  Denies AH or VH   Medication compliant

## 2022-10-02 NOTE — PLAN OF CARE
Problem: Anxiety  Goal: Anxiety is at manageable level  Description: Interventions:  - Assess and monitor patient's anxiety level  - Monitor for signs and symptoms (heart palpitations, chest pain, shortness of breath, headaches, nausea, feeling jumpy, restlessness, irritable, apprehensive)  - Collaborate with interdisciplinary team and initiate plan and interventions as ordered    - [place] patient to unit/surroundings  - Explain treatment plan  - Encourage participation in care  - Encourage verbalization of concerns/fears  - Identify coping mechanisms  - Assist in developing anxiety-reducing skills  - Administer/offer alternative therapies  - Limit or eliminate stimulants  Outcome: Progressing

## 2022-10-03 ENCOUNTER — VBI (OUTPATIENT)
Dept: ADMINISTRATIVE | Facility: OTHER | Age: 62
End: 2022-10-03

## 2022-10-03 PROCEDURE — 99232 SBSQ HOSP IP/OBS MODERATE 35: CPT | Performed by: STUDENT IN AN ORGANIZED HEALTH CARE EDUCATION/TRAINING PROGRAM

## 2022-10-03 RX ORDER — SERTRALINE HYDROCHLORIDE 100 MG/1
100 TABLET, FILM COATED ORAL DAILY
Status: DISCONTINUED | OUTPATIENT
Start: 2022-10-04 | End: 2022-10-05

## 2022-10-03 RX ORDER — LORAZEPAM 0.5 MG/1
0.25 TABLET ORAL EVERY 8 HOURS SCHEDULED
Status: COMPLETED | OUTPATIENT
Start: 2022-10-03 | End: 2022-10-03

## 2022-10-03 RX ORDER — ARIPIPRAZOLE 5 MG/1
5 TABLET ORAL DAILY
Status: DISCONTINUED | OUTPATIENT
Start: 2022-10-04 | End: 2022-10-07

## 2022-10-03 RX ORDER — MIRTAZAPINE 15 MG/1
15 TABLET, FILM COATED ORAL
Status: DISCONTINUED | OUTPATIENT
Start: 2022-10-03 | End: 2022-10-10

## 2022-10-03 RX ADMIN — LORAZEPAM 0.25 MG: 0.5 TABLET ORAL at 15:52

## 2022-10-03 RX ADMIN — MELATONIN TAB 3 MG 3 MG: 3 TAB at 21:21

## 2022-10-03 RX ADMIN — FAMOTIDINE 40 MG: 20 TABLET ORAL at 17:10

## 2022-10-03 RX ADMIN — PANTOPRAZOLE SODIUM 40 MG: 40 TABLET, DELAYED RELEASE ORAL at 08:22

## 2022-10-03 RX ADMIN — LEVOTHYROXINE SODIUM 50 MCG: 50 TABLET ORAL at 07:00

## 2022-10-03 RX ADMIN — FUROSEMIDE 20 MG: 40 TABLET ORAL at 08:24

## 2022-10-03 RX ADMIN — ARIPIPRAZOLE 2 MG: 2 TABLET ORAL at 08:24

## 2022-10-03 RX ADMIN — WARFARIN SODIUM 4 MG: 2 TABLET ORAL at 17:10

## 2022-10-03 RX ADMIN — LORAZEPAM 0.25 MG: 0.5 TABLET ORAL at 21:21

## 2022-10-03 RX ADMIN — SERTRALINE HYDROCHLORIDE 50 MG: 50 TABLET ORAL at 14:22

## 2022-10-03 RX ADMIN — MIRTAZAPINE 15 MG: 15 TABLET, FILM COATED ORAL at 21:21

## 2022-10-03 RX ADMIN — PANTOPRAZOLE SODIUM 40 MG: 40 TABLET, DELAYED RELEASE ORAL at 21:21

## 2022-10-03 RX ADMIN — CARVEDILOL 12.5 MG: 12.5 TABLET, FILM COATED ORAL at 15:52

## 2022-10-03 RX ADMIN — CARVEDILOL 12.5 MG: 12.5 TABLET, FILM COATED ORAL at 08:21

## 2022-10-03 RX ADMIN — TRAZODONE HYDROCHLORIDE 50 MG: 50 TABLET ORAL at 01:41

## 2022-10-03 RX ADMIN — LORAZEPAM 0.25 MG: 0.5 TABLET ORAL at 08:22

## 2022-10-03 RX ADMIN — SERTRALINE HYDROCHLORIDE 50 MG: 50 TABLET ORAL at 08:24

## 2022-10-03 RX ADMIN — ACETAMINOPHEN 650 MG: 325 TABLET ORAL at 21:50

## 2022-10-03 RX ADMIN — POTASSIUM CHLORIDE 20 MEQ: 1500 TABLET, EXTENDED RELEASE ORAL at 08:21

## 2022-10-03 RX ADMIN — TRAZODONE HYDROCHLORIDE 50 MG: 50 TABLET ORAL at 21:21

## 2022-10-03 RX ADMIN — ATORVASTATIN CALCIUM 40 MG: 40 TABLET, FILM COATED ORAL at 15:52

## 2022-10-03 RX ADMIN — FAMOTIDINE 40 MG: 20 TABLET ORAL at 08:21

## 2022-10-03 NOTE — NURSING NOTE
Pt appeared highly anxious during a m  medication pass, distracted and tangential  Stating he cannot take medications at this time, pt required multiple prompts  Pt cooperative, medication complaint  Pt appeared calmer throughout  afternoon, working on crosswAppear Here puzzle  Reported increased anxiety just prior to dinner, scheduled medications are effective for symptoms  Pt reports 2/4 anxiety, 5/10 depression, denies SI, A/VH  Visible in dayroom throughout shift

## 2022-10-03 NOTE — CASE MANAGEMENT
10/03/22 0847   Team Meeting   Meeting Type Daily Rounds   Initial Conference Date 10/03/22   Team Members Present   Team Members Present Physician;Nurse;;   Physician Team Member Dr Diane Gallego; Dr Chavis  Management Team Member [address] [place] [address] Work Team Member Dragan Borden   Patient/Family Present   Patient Present No   Patient's Family Present No     3/4 anxiety, slept, med compliant, somatic, preoccupied with meds, received PRN Trazodone and Atarax yesterday, visible, c/o back pain

## 2022-10-03 NOTE — NURSING NOTE
Pt is visible in milieu with limited interactions with peers and staff  Patient is perseverative concerning meds and times  Pt complaining of back pain 5/10  Tylenol given as ordered  Will continue to monitor

## 2022-10-03 NOTE — NURSING NOTE
Pt visible in day room, minimal socialization with peers  Anxious on approach  Pt perseverative on medication changes, expressing concern related to decrease in scheduled ativan  Pt states "I will have one of my worse days tomorrow"  Reports being "shakey after my thyroid pill in the morning" and that ativan helps get him "through the day"  Rates 2/4 anxiety, 5/10 depression, denies SI  Provided with prn trazodone 50 mg at 2121 per request  Will maintain q7min checks

## 2022-10-03 NOTE — PLAN OF CARE
Pt attends group and participates       Problem: Ineffective Coping  Goal: Participates in unit activities  Description: Interventions:  - Provide therapeutic environment   - Provide required programming   - Redirect inappropriate behaviors   Outcome: Progressing

## 2022-10-03 NOTE — CASE MANAGEMENT
Contacted [facility] to find out when pt's follow-up psych appt is scheduled   OP psych appt scheduled for 10/10 at 1:45pm with Dr Jorge Walker

## 2022-10-03 NOTE — NURSING NOTE
Pt came to nurse's station complaining of agitation, Trazodone administered as ordered   Pt returned to room, Will continue to monitor

## 2022-10-03 NOTE — PROGRESS NOTES
Progress Note - 2625 Neha Moses 58 y o  male MRN: [id number]  Unit/Bed#: Teresa Valentin 590-71 Encounter: [id number]    Assessment/Plan   Principal Problem:    SHIMON (generalized anxiety disorder)  Active Problems:    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 attack    Hypertension    GERD (gastroesophageal reflux disease)    Atrial fibrillation (HCC)    Class 2 obesity in adult    History of stroke    Antiphospholipid antibody with hypercoagulable state ([facility] [address] )    Hyperbilirubinemia    CORIE (obstructive sleep apnea)    PVD (peripheral vascular disease) ([facility])    Medical clearance for incarceration      Recommended Treatment:   Increase Abilify to 5 mg for psychotic symptoms as related to his anxiety  Increase sertraline to 100 mg daily for anxiety  Decrease mirtazapine to 15 mg for sleep and for cross titration with sertraline  Lorazepam 0 25 mg Q 8 scheduled to be discontinued after today  Continue melatonin 3 mg HS for sleep  Continue levothyroxine 50 mcg  Patient was started on this after TSH was 0 26  Start by medicine  Follow up TSH level    Continue with pharmacotherapy, group therapy, milieu therapy and occupational therapy  Continue to assess for adverse medication side effects  Encourage Hans Christina 22 to participate in nonverbal forms of therapy including journaling and art/music therapy  Continue frequent safety checks and vitals per unit protocol  Continue to engage CM/SW to assist with collateral, disposition planning, and the implementation of an individualized, patient-centered plan of care  Continue medical management by medical team   Case discussed with treatment team     Legal Status: 201  ------------------------------------------------------------    Subjective: All documentation including nursing notes, medication history to ensure medication adherence on the unit, labs, and vitals were reviewed   Annette Human was evaluated this morning for continuity of care and no acute distress noted throughout the evaluation  Over the past 24 hours per nursing report, Claudette Jones has been complaining of agitation, perseverative of timing of medications, cooperative on the unit and compliant with medications  Today, Claudette Jones is consenting for safety on the unit  Claudette Jones reports feeling "okay " Claudette Jones notes having improving but not adequate sleep  Claudette Jones states having a these appetite  Claudette Jones has been taking the medications as prescribed and reporting about side effects  Patient was seen and examined today  We discussed his current medication regimen as well as his recent list nation's seeing a dog under the table as well as a goalie in the floor  Patient understands these hallucinations and currently denies auditory and visual hallucinations  Patient continues to perseverate about constipation which she does not have  Patient states that he has been going rather regularly every day but this writer did inform patient that if he feels constipated he can ask for a p r n  Medication for his constipation  Patient overall appears calmer than when he was 1st admitted  Patient states he does feel mild improvement in his anxiety  Encourage patient to attend groups as patient states that Teresa Friendly is nothing to do here” patient states that his sister may need to be his power of  because she will need to “sign checks” for him while he is “in here”  Encouraged to discuss with Case Management    Claudette Jones denies suicidal ideations  Claudette Jones denies homicidal ideations   Regarding hallucinations, Claudette Jones denies and does not appear to be internally stimulated but does appear to be preoccupied with thoughts    PRNs overnight:  Trazodone for agitation, Atarax for anxiety  VS: Reviewed, within normal limits    Progress Toward Goals: slow improvement    Psychiatric Review of Systems:  Behavior over the last 24 hours:  improved  Sleep: Improving but not satisfactory  Appetite: normal  Medication side effects: No   ROS: all other systems are negative    Vital signs in last 24 hours:  Temp:  [96 9 °F (36 1 °C)-98 1 °F (36 7 °C)] 98 1 °F (36 7 °C)  HR:  [79-97] 97  Resp:  [16-18] 18  BP: (129-151)/(89-95) 146/95    Laboratory results:  I have personally reviewed all pertinent laboratory/tests results    Recent Results (from the past 50 hour(s))   Protime-INR    Collection Time: 10/02/22  6:06 AM   Result Value Ref Range    Protime 23 9 (H) 11 6 - 14 5 seconds    INR 2 09 (H) 0 84 - 1 19         Mental Status Evaluation:    Appearance:  casually dressed, marginal hygiene, looks older than stated age, [de-identified] appearing  male, in no apparent acute distress, good eye contact   Behavior:  cooperative   Speech:  normal rate and volume, Some stuttering and repeating phrases   Mood:  "Okay"   Affect:  Brighter than previous, less anxious than previous, some blunting   Thought Process:  circumstantial, perseverative, concrete   Associations: loose associations   Thought Content:  some paranoia, obsessions, ruminating thoughts, preoccupied   Perceptual Disturbances: Denies auditory or visual hallucinations, Does not appear to be responding to internal stimuli and Does appear to be preoccupied with thoughts   Risk Potential: Suicidal ideation - None at present  Homicidal ideation - None at present  Potential for aggression - Not at present   Sensorium:  oriented to person, place and time/date   Memory:  recent and remote memory grossly intact   Consciousness:  alert and awake   Attention/Concentration: attention span and concentration are age appropriate   Insight:  partial   Judgment: partial   Gait/Station: slow gait   Motor Activity: No abnormal movements:  Baseline left arm tremor left over from stroke       Current Medications:  Current Facility-Administered Medications   Medication Dose Route Frequency Provider Last Rate   • acetaminophen  650 mg Oral Q4H PRN Jose Helms MD     • acetaminophen  650 mg Oral Q4H PRN Pearl Torres Jorge Ngo MD     • acetaminophen  975 mg Oral Q6H PRN Olegario Benítez, MD     • aluminum-magnesium hydroxide-simethicone  30 mL Oral Q4H PRN Olegario Lips, MD     • ARIPiprazole  2 mg Oral Daily Va Quesada DO     • atorvastatin  40 mg Oral Daily With Sundar Daily MD     • benztropine  1 mg Intramuscular Q4H PRN Max 6/day Olegario Benítez, MD     • benztropine  0 5 mg Oral Q4H PRN Max 6/day [place] Lips, MD     • carvedilol  12 5 mg Oral BID With Meals Olegario Benítez, MD     • famotidine  40 mg Oral BID Jacquesy Later, PA-C     • furosemide  20 mg Oral Daily Olegario Benítez, MD     • haloperidol lactate  5 mg Intramuscular Q4H PRN Max 4/day [place] Lips, MD     • hydrOXYzine HCL  25 mg Oral Q6H PRN Max 4/day [place] Lips, MD     • levothyroxine  50 mcg Oral Early Morning Oliver Almazan MD     • LORazepam  1 mg Intramuscular Q6H PRN Max 3/day Olegario Lips, MD     • LORazepam  0 25 mg Oral [facility] Giuliano Kaminski MD     • melatonin  3 mg Oral HS [place] Lips, MD     • mirtazapine  22 5 mg Oral HS Olegario Benítez, MD     • pantoprazole  40 mg Oral BID Manny Later, PA-C     • polyethylene glycol  17 g Oral Daily PRN Olegario Benítez, MD     • potassium chloride  20 mEq Oral Daily Olegario Benítez, MD     • propranolol  5 mg Oral Q8H PRN Olegario Benítez, MD     • risperiDONE  0 25 mg Oral Q4H PRN Max 6/day [place] Lips, MD     • risperiDONE  0 5 mg Oral Q4H PRN Max 3/day [place] Lips, MD     • risperiDONE  1 mg Oral Q2H PRN Max 3/day [place] Lips, MD     • senna-docusate sodium  1 tablet Oral Daily PRN Olegario Lips, MD     • sertraline  50 mg Oral Daily Va Quesada DO     • traZODone  50 mg Oral Q6H PRN Max 3/day Olegario Benítez, MD     • traZODone  50 mg Oral HS PRN Olegario Lips, MD     • warfarin  4 mg Oral Daily (warfarin) Oliver Almazan MD         Suicide/Homicide Risk Assessment:  Risk of Harm to Self:   Based on today's assessment, Zach vincent the following risk of harm to self: minimal    Risk of Harm to Others:  Based on today's assessment, Mariah Marie presents the following risk of harm to others: minimal    The following interventions are recommended: behavioral checks every 7 minutes, continued hospitalization on locked unit    Behavioral Health Medications: All current active meds have been reviewed  Changes as in plan section above  Risks, benefits and possible side effects of Medications:   Risks, benefits, and possible side effects of medications explained to patient and patient verbalizes understanding  Counseling / Coordination of Care:  Patient's progress discussed with staff in treatment team meeting  Medications, treatment progress and treatment plan reviewed with patient  Mendel Grates 10/03/22  Psychiatry Resident, PGY-II    This note was completed in part utilizing M-Modal Fluency Direct Software  Grammatical, translation, syntax errors, random word insertions, spelling mistakes, and incomplete sentences may be an occasional consequence of this system secondary to software limitations with voice recognition, ambient noise, and hardware issues  If you have any questions or concerns about the content, text, or information contained within the body of this dictation, please contact the provider for clarification

## 2022-10-03 NOTE — PROGRESS NOTES
Pt attended [facility] Open discussion: relapse and recovery group  Pt was able to self express when prompted  Pt focused on   recovery needs and displaying an awareness  Constricted affect and anxious  10/03/22 1100   Activity/Group Checklist   Group Other (Comment)  ( Open discussion: relapse and recovery)   Attendance Attended   Attendance Duration (min) 31-45   Interactions Interacted appropriately   Affect/Mood Constricted   Goals Achieved Able to listen to others; Able to engage in interactions; Able to reflect/comment on own behavior; Identified feelings; Identified relapse prevention strategies

## 2022-10-03 NOTE — PLAN OF CARE
Problem: Anxiety  Goal: Anxiety is at manageable level  Description: Interventions:  - Assess and monitor patient's anxiety level  - Monitor for signs and symptoms (heart palpitations, chest pain, shortness of breath, headaches, nausea, feeling jumpy, restlessness, irritable, apprehensive)  - Collaborate with interdisciplinary team and initiate plan and interventions as ordered    - [place] patient to unit/surroundings  - Explain treatment plan  - Encourage participation in care  - Encourage verbalization of concerns/fears  - Identify coping mechanisms  - Assist in developing anxiety-reducing skills  - Administer/offer alternative therapies  - Limit or eliminate stimulants  Outcome: Progressing

## 2022-10-04 PROCEDURE — 99232 SBSQ HOSP IP/OBS MODERATE 35: CPT | Performed by: STUDENT IN AN ORGANIZED HEALTH CARE EDUCATION/TRAINING PROGRAM

## 2022-10-04 RX ORDER — LORAZEPAM 0.5 MG/1
0.25 TABLET ORAL 2 TIMES DAILY
Status: COMPLETED | OUTPATIENT
Start: 2022-10-04 | End: 2022-10-04

## 2022-10-04 RX ADMIN — TRAZODONE HYDROCHLORIDE 50 MG: 50 TABLET ORAL at 22:07

## 2022-10-04 RX ADMIN — MELATONIN TAB 3 MG 3 MG: 3 TAB at 22:07

## 2022-10-04 RX ADMIN — LORAZEPAM 0.25 MG: 0.5 TABLET ORAL at 17:11

## 2022-10-04 RX ADMIN — HYDROXYZINE HYDROCHLORIDE 25 MG: 25 TABLET, FILM COATED ORAL at 05:54

## 2022-10-04 RX ADMIN — PANTOPRAZOLE SODIUM 40 MG: 40 TABLET, DELAYED RELEASE ORAL at 22:07

## 2022-10-04 RX ADMIN — PANTOPRAZOLE SODIUM 40 MG: 40 TABLET, DELAYED RELEASE ORAL at 09:19

## 2022-10-04 RX ADMIN — FAMOTIDINE 40 MG: 20 TABLET ORAL at 09:19

## 2022-10-04 RX ADMIN — CARVEDILOL 12.5 MG: 12.5 TABLET, FILM COATED ORAL at 09:19

## 2022-10-04 RX ADMIN — FAMOTIDINE 40 MG: 20 TABLET ORAL at 17:10

## 2022-10-04 RX ADMIN — LORAZEPAM 0.25 MG: 0.5 TABLET ORAL at 12:32

## 2022-10-04 RX ADMIN — ARIPIPRAZOLE 5 MG: 5 TABLET ORAL at 09:18

## 2022-10-04 RX ADMIN — LEVOTHYROXINE SODIUM 50 MCG: 50 TABLET ORAL at 05:54

## 2022-10-04 RX ADMIN — SERTRALINE HYDROCHLORIDE 100 MG: 100 TABLET ORAL at 09:17

## 2022-10-04 RX ADMIN — MIRTAZAPINE 15 MG: 15 TABLET, FILM COATED ORAL at 22:07

## 2022-10-04 RX ADMIN — FUROSEMIDE 20 MG: 40 TABLET ORAL at 09:17

## 2022-10-04 RX ADMIN — POTASSIUM CHLORIDE 20 MEQ: 1500 TABLET, EXTENDED RELEASE ORAL at 09:18

## 2022-10-04 RX ADMIN — ATORVASTATIN CALCIUM 40 MG: 40 TABLET, FILM COATED ORAL at 17:10

## 2022-10-04 RX ADMIN — CARVEDILOL 12.5 MG: 12.5 TABLET, FILM COATED ORAL at 17:10

## 2022-10-04 RX ADMIN — WARFARIN SODIUM 4 MG: 2 TABLET ORAL at 17:10

## 2022-10-04 RX ADMIN — TRAZODONE HYDROCHLORIDE 50 MG: 50 TABLET ORAL at 01:12

## 2022-10-04 NOTE — PLAN OF CARE
Problem: Ineffective Coping  Goal: Cooperates with admission process  Description: Interventions:   - Complete admission process  Outcome: Completed  Goal: Identifies ineffective coping skills  Outcome: Progressing  Goal: Identifies healthy coping skills  Outcome: Progressing  Goal: Demonstrates healthy coping skills  Outcome: Progressing  Goal: Patient/Family participate in treatment and DC plans  Description: Interventions:  - Provide therapeutic environment  Outcome: Progressing  Goal: Patient/Family verbalizes awareness of resources  Outcome: Progressing  Goal: Understands least restrictive measures  Description: Interventions:  - Utilize least restrictive behavior  Outcome: Progressing  Goal: Free from restraint events  Description: - Utilize least restrictive measures   - Provide behavioral interventions   - Redirect inappropriate behaviors   Outcome: Progressing     Problem: Anxiety  Goal: Anxiety is at manageable level  Description: Interventions:  - Assess and monitor patient's anxiety level  - Monitor for signs and symptoms (heart palpitations, chest pain, shortness of breath, headaches, nausea, feeling jumpy, restlessness, irritable, apprehensive)  - Collaborate with interdisciplinary team and initiate plan and interventions as ordered    - [place] patient to unit/surroundings  - Explain treatment plan  - Encourage participation in care  - Encourage verbalization of concerns/fears  - Identify coping mechanisms  - Assist in developing anxiety-reducing skills  - Administer/offer alternative therapies  - Limit or eliminate stimulants  Outcome: Progressing

## 2022-10-04 NOTE — NURSING NOTE
Pt alert and visible on the unit  Continues to perserverate and need max prompting to complete tasks  Tends to project negative thoughts toward all activities  Preoccupied with bowel and bladder functions, "not being able to sleep" and what he will do "without ativan"  Difficult to redirect  Stated, "I can't do it  What am I going to do " Pt able to take medication when given 2 at a time  Fine hand tremors noted bilaterally, greater on left side  No peer interactions noted  Appetite 75% for breakfast, 25% for lunch and 25% dinner  Denied depression but reported "high, high" anxiety  No si  Stated, "I need ativan  I don't like this  It is a recipe for disaster  Will I live till the morning?" Fatalistic and projects anxiety onto all concerns  Will continue to monitor and maintain q 7 min checks

## 2022-10-04 NOTE — PLAN OF CARE
Problem: Ineffective Coping  Goal: Cooperates with admission process  Description: Interventions:   - Complete admission process  Outcome: Completed  Goal: Identifies ineffective coping skills  10/4/2022 1149 by Emmett Damon  Outcome: Progressing  10/4/2022 1117 by Emmett Damon  Outcome: Progressing  Goal: Identifies healthy coping skills  10/4/2022 1149 by Emmett Damon  Outcome: Progressing  10/4/2022 1117 by Emmett Damon  Outcome: Progressing  Goal: Demonstrates healthy coping skills  10/4/2022 1149 by Emmett Damon  Outcome: Progressing  10/4/2022 1117 by Emmett Damon  Outcome: Progressing  Goal: Participates in unit activities  Description: Interventions:  - Provide therapeutic environment   - Provide required programming   - Redirect inappropriate behaviors   Outcome: Progressing  Goal: Patient/Family participate in treatment and DC plans  Description: Interventions:  - Provide therapeutic environment  10/4/2022 1149 by Emmett Damon  Outcome: Progressing  10/4/2022 1117 by Emmett Damon  Outcome: Progressing  Goal: Patient/Family verbalizes awareness of resources  10/4/2022 1149 by Emmett Damon  Outcome: Progressing  10/4/2022 1117 by Emmett Damon  Outcome: Progressing  Goal: Understands least restrictive measures  Description: Interventions:  - Utilize least restrictive behavior  10/4/2022 1149 by Emmett Damon  Outcome: Progressing  10/4/2022 1117 by Emmett Damon  Outcome: Progressing  Goal: Free from restraint events  Description: - Utilize least restrictive measures   - Provide behavioral interventions   - Redirect inappropriate behaviors   10/4/2022 1149 by Emmett Damon  Outcome: Progressing  10/4/2022 1117 by Emmett Damon  Outcome: Progressing     Problem: Anxiety  Goal: Anxiety is at manageable level  Description: Interventions:  - Assess and monitor patient's anxiety level     - Monitor for signs and symptoms (heart palpitations, chest pain, shortness of breath, headaches, nausea, feeling jumpy, restlessness, irritable, apprehensive)  - Collaborate with interdisciplinary team and initiate plan and interventions as ordered    - [place] patient to unit/surroundings  - Explain treatment plan  - Encourage participation in care  - Encourage verbalization of concerns/fears  - Identify coping mechanisms  - Assist in developing anxiety-reducing skills  - Administer/offer alternative therapies  - Limit or eliminate stimulants  10/4/2022 1149 by Tyler Hutchins  Outcome: Progressing  10/4/2022 1117 by Tyler Hutchins  Outcome: Progressing

## 2022-10-04 NOTE — PLAN OF CARE
Pt attends groups and visible      Problem: Ineffective Coping  Goal: Participates in unit activities  Description: Interventions:  - Provide therapeutic environment   - Provide required programming   - Redirect inappropriate behaviors   Outcome: Progressing

## 2022-10-04 NOTE — PROGRESS NOTES
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 attack    Hypertension    GERD (gastroesophageal reflux disease)    Atrial fibrillation (HCC)    Class 2 obesity in adult    History of stroke    Antiphospholipid antibody with hypercoagulable state ([facility] [address] )    Hyperbilirubinemia    CORIE (obstructive sleep apnea)    PVD (peripheral vascular disease) ([facility])    Medical clearance for incarceration      Recommended Treatment:   Continue Abilify 5 mg daily for psychotic nature of anxiety  Discontinue levothyroxine  Continue mirtazapine 15 mg HS for sleep  Continue sertraline 100 mg daily for anxiety  Continue melatonin 3 mg HS for sleep  Give Ativan 0 25 mg for 2 doses today only for worsening anxiety symptoms due to the administration of levothyroxine    Continue with pharmacotherapy, group therapy, milieu therapy and occupational therapy  Continue to assess for adverse medication side effects  Encourage Hans Vasquezbuddy Ruben to participate in nonverbal forms of therapy including journaling and art/music therapy  Continue frequent safety checks and vitals per unit protocol  Continue to engage CM/SW to assist with collateral, disposition planning, and the implementation of an individualized, patient-centered plan of care  Continue medical management by medical team   Case discussed with treatment team     Legal Status: [id number]  ------------------------------------------------------------    Subjective: All documentation including nursing notes, medication history to ensure medication adherence on the unit, labs, and vitals were reviewed  Rodolfo Reddy was evaluated this morning for continuity of care and no acute distress noted throughout the evaluation   Over the past 24 hours per nursing report, Rodolfo Reddy has been cooperative on the unit and compliant with medications  Today, Kathy Robertson is consenting for safety on the unit  Kathy Robertson reports feeling "scared " Kathy Robertson notes having continued decreased sleep  Kathy Robertson states having a decent appetite  Kathy Robertson has been  taking the medications as prescribed and reporting no side effects  Patient was seen and examined today  We discussed his feelings around his fear of not knowing what medication he is getting more when he is getting it  Patient states that he wants his Ativan back and that he is afraid to say what his panic attacks consist of because he thinks that his words “will get used against me”  Patient continues to repeat words, remains guarded, states that ever since taking his levothyroxine he is having increased shakiness, can not settle down, feels warm and sweaty and feels prickling sensations on his head  Visibly he looks that the having tics and some abnormal eye movements at times  He states these are similar sensations when he typically has panic attacks  Patient additionally states that he is not peeing or going to the bathroom however upon more discussion he is having small bowel movements on a daily basis  Patient was reminded yet again that he can have p r n  Constipation medication if he feels that he needs it, all he has to do is ask for it  Patient is concerned that he will be in here “forever”  Provided support    Patient then asks this writer if he should take a shower if he should go to the bathroom  Provided support    Kathy Robertson denies suicidal ideations  Kathy Robertson denies homicidal ideations   Regarding hallucinations, Kathy Robertson denies and does not appear to be internally stimulated    PRNs overnight:  Trazodone, twice for sleep and Atarax for anxiety   VS: Reviewed, within normal limits    Progress Toward Goals: slow improvement    Psychiatric Review of Systems:  Behavior over the last 24 hours:  regressed  Sleep: decreased  Appetite: normal  Medication side effects: Yes - constipation, muscle aches, muscle twitching, tremor, more prominent today and worsened today, likely related to levothyroxine  ROS: all other systems are negative    Vital signs in last 24 hours:  Temp:  [97 3 °F (36 3 °C)-97 7 °F (36 5 °C)] 97 4 °F (36 3 °C)  HR:  [] 102  Resp:  [18-20] 20  BP: (131-150)/(75-80) 133/75    Laboratory results:  I have personally reviewed all pertinent laboratory/tests results  No results found for this or any previous visit (from the past 48 hour(s))        Mental Status Evaluation:    Appearance:  casually dressed, marginal hygiene, looks older than stated age, overweight, Overtly appearing  male, not in no apparent acute distress, good eye contact   Behavior:  cooperative   Speech:  normal rate and volume, perseverative, Repeating phrases, stuttering, worse than yesterday   Mood:  "Scared"   Affect:  constricted, Anxious   Thought Process:  circumstantial, perseverative, concrete   Associations: concrete associations   Thought Content:  some paranoia, ruminations, obsessive thinking   Perceptual Disturbances: Denies auditory or visual hallucinations and Does not appear to be responding to internal stimuli   Risk Potential: Suicidal ideation - None at present  Homicidal ideation - None at present  Potential for aggression - Not at present   Sensorium:  oriented to person, place and time/date   Memory:  recent and remote memory grossly intact   Consciousness:  alert and awake   Attention/Concentration: attention span and concentration are age appropriate   Insight:  limited   Judgment: limited   Gait/Station: slow gait   Motor Activity: no abnormal movements       Current Medications:  Current Facility-Administered Medications   Medication Dose Route Frequency Provider Last Rate   • acetaminophen  650 mg Oral Q4H PRN Mauro Knowles MD     • acetaminophen  650 mg Oral Q4H PRN Mauro Knowles MD     • acetaminophen  975 mg Oral Q6H PRN Guero Hammond Sherlyn Kehr, MD     • aluminum-magnesium hydroxide-simethicone  30 mL Oral Q4H PRN Mayuri Watt MD     • ARIPiprazole  5 mg Oral Daily Freida Coleman DO     • atorvastatin  40 mg Oral Daily With Linh Gallegos MD     • benztropine  1 mg Intramuscular Q4H PRN Max 6/day Mayuri Watt MD     • benztropine  0 5 mg Oral Q4H PRN Max 6/day Mayuri Watt MD     • carvedilol  12 5 mg Oral BID With Meals Mayuri Watt MD     • famotidine  40 mg Oral BID Kaushik Fuller PA-C     • furosemide  20 mg Oral Daily Mayuri Watt MD     • haloperidol lactate  5 mg Intramuscular Q4H PRN Max 4/day Mayuri Watt MD     • hydrOXYzine HCL  25 mg Oral Q6H PRN Max 4/day Laura Chirinos MD     • levothyroxine  50 mcg Oral Early Morning Viviana Aponte MD     • LORazepam  1 mg Intramuscular Q6H PRN Max 3/day Mayuri Watt MD     • melatonin  3 mg Oral HS Mayuri Watt MD     • mirtazapine  15 mg Oral HS rFeida Coleman DO     • pantoprazole  40 mg Oral BID Kaushik Fuller PA-C     • polyethylene glycol  17 g Oral Daily PRN Mayuri Watt MD     • potassium chloride  20 mEq Oral Daily Mayuri Watt MD     • propranolol  5 mg Oral Q8H PRN Mayuri Watt MD     • risperiDONE  0 25 mg Oral Q4H PRN Max 6/day Mayuri Watt MD     • risperiDONE  0 5 mg Oral Q4H PRN Max 3/day Mayuri Watt MD     • risperiDONE  1 mg Oral Q2H PRN Max 3/day Mayuri Watt MD     • senna-docusate sodium  1 tablet Oral Daily PRN Mayuri Watt MD     • sertraline  100 mg Oral Daily Freida Coleman DO     • traZODone  50 mg Oral Q6H PRN Max 3/day Mayuri Watt MD     • traZODone  50 mg Oral HS PRN Mayuri Watt MD     • warfarin  4 mg Oral Daily (warfarin) Viviana Aponte MD         Suicide/Homicide Risk Assessment:  Risk of Harm to Self:   Based on today's assessment, [place] presents the following risk of harm to self: minimal    Risk of Harm to Others:  Based on today's assessment, Ora Chung presents the following risk of harm to others: minimal    The following interventions are recommended: behavioral checks every 7 minutes, continued hospitalization on locked unit    Behavioral Health Medications: All current active meds have been reviewed  Changes as in plan section above  Risks, benefits and possible side effects of Medications:   Risks, benefits, and possible side effects of medications explained to patient and patient verbalizes understanding  Counseling / Coordination of Care:  Patient's progress discussed with staff in treatment team meeting  Medications, treatment progress and treatment plan reviewed with patient  Freida Virginia 10/04/22  Psychiatry Resident, PGY-II    This note was completed in part utilizing M-Modal Fluency Direct Software  Grammatical, translation, syntax errors, random word insertions, spelling mistakes, and incomplete sentences may be an occasional consequence of this system secondary to software limitations with voice recognition, ambient noise, and hardware issues  If you have any questions or concerns about the content, text, or information contained within the body of this dictation, please contact the provider for clarification

## 2022-10-04 NOTE — CASE MANAGEMENT
10/04/22 0851   Team Meeting   Meeting Type Daily Rounds   Initial Conference Date 10/04/22   Team Members Present   Team Members Present Physician;Nurse;;   Physician Team Member Dr Deepali Parada; Dr Jimi Barahona Management Team Member [address] [place] [address] Work Team Member Ethan Cooper   Patient/Family Present   Patient Present No   Patient's Family Present No     Visible, multiple med changes, preoccupied with Ativan dose, med compliant, slept, received PRN Atarax x1 and Trazodone x2, 5/10 depression, 2/4 anxiety, cooperative, attended group

## 2022-10-04 NOTE — NURSING NOTE
Pt approached nurses station shaking stating "what do I do now?" Pt preoccupied with not receiving scheduled ativan this morning  Provided with prn atarax at [phone]  Pt remained repeating "what do I do now" "what if this doesn't work"  Encouraged and counseled

## 2022-10-05 LAB
INR PPP: 2.36 (ref 0.84–1.19)
PROTHROMBIN TIME: 26.3 SECONDS (ref 11.6–14.5)
T4 FREE SERPL-MCNC: 1.8 NG/DL (ref 0.76–1.46)
TSH SERPL DL<=0.05 MIU/L-ACNC: 0.29 UIU/ML (ref 0.45–4.5)

## 2022-10-05 PROCEDURE — 99232 SBSQ HOSP IP/OBS MODERATE 35: CPT | Performed by: STUDENT IN AN ORGANIZED HEALTH CARE EDUCATION/TRAINING PROGRAM

## 2022-10-05 PROCEDURE — 84439 ASSAY OF FREE THYROXINE: CPT | Performed by: PHYSICIAN ASSISTANT

## 2022-10-05 PROCEDURE — 85610 PROTHROMBIN TIME: CPT | Performed by: PHYSICIAN ASSISTANT

## 2022-10-05 PROCEDURE — 84443 ASSAY THYROID STIM HORMONE: CPT | Performed by: PHYSICIAN ASSISTANT

## 2022-10-05 RX ORDER — LORAZEPAM 0.5 MG/1
0.25 TABLET ORAL 2 TIMES DAILY
Status: DISCONTINUED | OUTPATIENT
Start: 2022-10-05 | End: 2022-10-06

## 2022-10-05 RX ORDER — TRAZODONE HYDROCHLORIDE 100 MG/1
100 TABLET ORAL
Status: DISCONTINUED | OUTPATIENT
Start: 2022-10-05 | End: 2022-10-17

## 2022-10-05 RX ORDER — TRAZODONE HYDROCHLORIDE 100 MG/1
100 TABLET ORAL
Status: DISCONTINUED | OUTPATIENT
Start: 2022-10-05 | End: 2022-10-05

## 2022-10-05 RX ADMIN — WARFARIN SODIUM 4 MG: 2 TABLET ORAL at 17:11

## 2022-10-05 RX ADMIN — ATORVASTATIN CALCIUM 40 MG: 40 TABLET, FILM COATED ORAL at 16:25

## 2022-10-05 RX ADMIN — MIRTAZAPINE 15 MG: 15 TABLET, FILM COATED ORAL at 21:00

## 2022-10-05 RX ADMIN — CARVEDILOL 12.5 MG: 12.5 TABLET, FILM COATED ORAL at 08:29

## 2022-10-05 RX ADMIN — FUROSEMIDE 20 MG: 40 TABLET ORAL at 08:30

## 2022-10-05 RX ADMIN — PANTOPRAZOLE SODIUM 40 MG: 40 TABLET, DELAYED RELEASE ORAL at 21:00

## 2022-10-05 RX ADMIN — MELATONIN TAB 3 MG 3 MG: 3 TAB at 21:00

## 2022-10-05 RX ADMIN — SERTRALINE HYDROCHLORIDE 100 MG: 100 TABLET ORAL at 08:31

## 2022-10-05 RX ADMIN — POTASSIUM CHLORIDE 20 MEQ: 1500 TABLET, EXTENDED RELEASE ORAL at 08:31

## 2022-10-05 RX ADMIN — FAMOTIDINE 40 MG: 20 TABLET ORAL at 17:12

## 2022-10-05 RX ADMIN — PANTOPRAZOLE SODIUM 40 MG: 40 TABLET, DELAYED RELEASE ORAL at 08:30

## 2022-10-05 RX ADMIN — HYDROXYZINE HYDROCHLORIDE 25 MG: 25 TABLET, FILM COATED ORAL at 09:26

## 2022-10-05 RX ADMIN — ARIPIPRAZOLE 5 MG: 5 TABLET ORAL at 09:46

## 2022-10-05 RX ADMIN — LORAZEPAM 0.25 MG: 1 TABLET ORAL at 19:02

## 2022-10-05 RX ADMIN — TRAZODONE HYDROCHLORIDE 100 MG: 100 TABLET ORAL at 21:00

## 2022-10-05 RX ADMIN — SENNOSIDES AND DOCUSATE SODIUM 1 TABLET: 50; 8.6 TABLET ORAL at 10:22

## 2022-10-05 RX ADMIN — FAMOTIDINE 40 MG: 20 TABLET ORAL at 08:29

## 2022-10-05 RX ADMIN — CARVEDILOL 12.5 MG: 12.5 TABLET, FILM COATED ORAL at 16:25

## 2022-10-05 NOTE — PROGRESS NOTES
Progress Note - 2625 Neha Moses 58 y o  male MRN: [id number]  Unit/Bed#: [facility] 931-76 Encounter: [id number]    Assessment/Plan   Principal Problem:    SHIMON (generalized anxiety disorder)  Active Problems:    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 attack    Hypertension    GERD (gastroesophageal reflux disease)    Atrial fibrillation (HCC)    Class 2 obesity in adult    History of stroke    Antiphospholipid antibody with hypercoagulable state ([address] )    Hyperbilirubinemia    CORIE (obstructive sleep apnea)    PVD (peripheral vascular disease) ([facility])    Medical clearance for incarceration      Recommended Treatment:   Continue Abilify 5 mg for thought process  Continue melatonin 3 mg HS for sleep  Schedule trazodone 100 mg HS for sleep  Continue mirtazapine 15 mg HS for sleep  Increase sertraline to 150 mg daily for her anxiety  Provide Ativan 0 25 mg a m  And HS for 3 days for excessive anxiety  Patient is to utilize hydroxyzine during day for anxiety    Continue with pharmacotherapy, group therapy, milieu therapy and occupational therapy  Continue to assess for adverse medication side effects  Encourage Hans Vasquezbuddy Ruben to participate in nonverbal forms of therapy including journaling and art/music therapy  Continue frequent safety checks and vitals per unit protocol  Continue to engage CM/SW to assist with collateral, disposition planning, and the implementation of an individualized, patient-centered plan of care  Continue medical management by medical team   Case discussed with treatment team     Legal Status: 201  ------------------------------------------------------------    Subjective: All documentation including nursing notes, medication history to ensure medication adherence on the unit, labs, and vitals were reviewed  Lorena Garcia was evaluated this morning for continuity of care and no acute distress noted throughout the evaluation   Over the past 24 hours per nursing report, Luis Suleiman has been fatalistic, perseverative about his bowel and bladder, cooperative on the unit and compliant with medications  Today, Luis Suleiman is consenting for safety on the unit  Luis Bearden reports feeling "not good " Luis Bearden notes having decreased sleep  Luis Poster states having a decreased appetite however per nursing report patient ate 75% of breakfast and 25% of lunch and dinner the night before  Hank Schulte has been taking the medications as prescribed and reporting somatic side effects including but not limited to:  People's eyes getting “puffy”, there being “edges to the end of furniture”, feeling like his head is expanding and michael, feeling pain in his lower abdomen, being unable to pee  Patient states that all of these are happening due to medication side effects  Provided psychoeducation about medications and what their side effects could be verses what he is experiencing  Patient did not like this explanation and stated that this writer was “living in a dream “  Provided patient with the plan for today and moving forward in patient continues to perseverate around having a bowel movement, having incontinence, medications and the plan  As well as perseverating on these other somatic complaints  Luis Poster denies suicidal ideations  Luis Poster denies homicidal ideations  Regarding hallucinations, Luis Poster denies and does not appear to be internally stimulated however does appear to be internally preoccupied with thoughts      PRNs overnight:  Trazodone total of 100 mg   VS: Reviewed, Blood pressure elevated, otherwise within normal limits    Progress Toward Goals:  Minimal improvement    Psychiatric Review of Systems:  Behavior over the last 24 hours:  improved  Sleep: slept off and on  Appetite: Patient states poor oral intake however per nursing report has been eating  Medication side effects: According to patient yes however these appear to be more somatic complaints than medication side effects   ROS: all other systems are negative    Vital signs in last 24 hours:  Temp:  [97 5 °F (36 4 °C)-97 6 °F (36 4 °C)] (P) 97 5 °F (36 4 °C)  HR:  [] (P) 100  Resp:  [18] 18  BP: (173-174)/() 173/102    Laboratory results:  I have personally reviewed all pertinent laboratory/tests results    Recent Results (from the past 48 hour(s))   Protime-INR    Collection Time: 10/05/22  5:54 AM   Result Value Ref Range    Protime 26 3 (H) 11 6 - 14 5 seconds    INR 2 36 (H) 0 84 - 1 19   TSH, 3rd generation with Free T4 reflex    Collection Time: 10/05/22  5:54 AM   Result Value Ref Range    TSH 3RD GENERATON 0 290 (L) 0 450 - 4 500 uIU/mL         Mental Status Evaluation:    Appearance:  casually dressed, marginal hygiene, looks older than stated age, overweight, bearded, overtly appearing  male, in no apparent acute distress, intermittent eye contact   Behavior:  cooperative, Irritable edge at time   Speech:  normal rate and volume, Some stuttering, phrase repeating, improved from yesterday   Mood:  "Not good"   Affect:  constricted, Anxious   Thought Process:  Generally organized however sometimes disorganized, very perseverative,   Associations: circumstantial associations   Thought Content:  Somatic preoccupation, perseveration about medications, bowel movements, bladder, fatalistic thought content, negative thinking   Perceptual Disturbances: Denies auditory or visual hallucinations and Does not appear to be responding to internal stimuli   Risk Potential: Suicidal ideation - None at present  Homicidal ideation - None at present  Potential for aggression - Not at present   Sensorium:  oriented to person, place and time/date   Memory:  recent and remote memory grossly intact   Consciousness:  alert and awake   Attention/Concentration: attention span and concentration are age appropriate   Insight:  poor   Judgment: poor   Gait/Station: slow gait   Motor Activity: no abnormal movements, left-sided tremor from old stroke Current Medications:  Current Facility-Administered Medications   Medication Dose Route Frequency Provider Last Rate   • acetaminophen  650 mg Oral Q4H PRN Hoda Gutierrez MD     • acetaminophen  650 mg Oral Q4H PRN Hoda Gutierrez MD     • acetaminophen  975 mg Oral Q6H PRN Hoda Gutierrez MD     • aluminum-magnesium hydroxide-simethicone  30 mL Oral Q4H PRN Hoda Gutierrez MD     • ARIPiprazole  5 mg Oral Daily Janay Slot, DO     • atorvastatin  40 mg Oral Daily With Dante Araiza MD     • benztropine  1 mg Intramuscular Q4H PRN Max 6/day Hoda Gutierrez MD     • benztropine  0 5 mg Oral Q4H PRN Max 6/day Hoda Gutierrez MD     • carvedilol  12 5 mg Oral BID With Meals Hoda Gutierrez MD     • famotidine  40 mg Oral BID Yasmeen Tse PA-C     • furosemide  20 mg Oral Daily Hoda Gutierrez MD     • haloperidol lactate  5 mg Intramuscular Q4H PRN Max 4/day Hoda Gutierrez MD     • hydrOXYzine HCL  25 mg Oral Q6H PRN Max 4/day Hoda Gutierrez MD     • LORazepam  1 mg Intramuscular Q6H PRN Max 3/day Hoda Gutierrez MD     • melatonin  3 mg Oral HS Hoda Gutierrez MD     • mirtazapine  15 mg Oral HS Janay Jarrell, DO     • pantoprazole  40 mg Oral BID Yasmeen Tse PA-C     • polyethylene glycol  17 g Oral Daily PRN Hoda Gutierrez MD     • potassium chloride  20 mEq Oral Daily Laura Clements MD     • propranolol  5 mg Oral Q8H PRN Hoda Gutierrez MD     • risperiDONE  0 25 mg Oral Q4H PRN Max 6/day Hoda Gutierrez MD     • risperiDONE  0 5 mg Oral Q4H PRN Max 3/day Hoda Gutierrez MD     • risperiDONE  1 mg Oral Q2H PRN Max 3/day Hoda Gutierrez MD     • senna-docusate sodium  1 tablet Oral Daily PRN Hoda Gutierrez MD     • sertraline  100 mg Oral Daily Janay Slot, DO     • traZODone  50 mg Oral Q6H PRN Max 3/day Hoda Gutierrez MD     • traZODone  50 mg Oral HS PRN Hoda Gutierrez MD     • warfarin  4 mg Oral Daily (warfarin) Samantha Holloway, MD         Suicide/Homicide Risk Assessment:  Risk of Harm to Self:   Based on today's assessment, Ange Shown presents the following risk of harm to self: minimal    Risk of Harm to Others:  Based on today's assessment, Ange Shown presents the following risk of harm to others: minimal    The following interventions are recommended: behavioral checks every 7 minutes, continued hospitalization on locked unit    Behavioral Health Medications: All current active meds have been reviewed  Changes as in plan section above  Risks, benefits and possible side effects of Medications:   Risks, benefits, and possible side effects of medications explained to patient and patient verbalizes understanding  Counseling / Coordination of Care:  Patient's progress discussed with staff in treatment team meeting  Medications, treatment progress and treatment plan reviewed with patient  Fadi Jayshree 10/05/22  Psychiatry Resident, PGY-II    This note was completed in part utilizing [employer] Software  Grammatical, translation, syntax errors, random word insertions, spelling mistakes, and incomplete sentences may be an occasional consequence of this system secondary to software limitations with voice recognition, ambient noise, and hardware issues  If you have any questions or concerns about the content, text, or information contained within the body of this dictation, please contact the provider for clarification

## 2022-10-05 NOTE — CASE MANAGEMENT
[phone] offering care coordination services  CM to follow up with pt

## 2022-10-05 NOTE — NURSING NOTE
Patient continues to report he has to pee but cannot  Bladder scanned revealed 488 mL @ 2215  Patient given water and heavily encouraged to try and void in the next 30 minutes  Will continue to monitor

## 2022-10-05 NOTE — PROGRESS NOTES
Pt attended short term problem solving group  Pt late to group and did not interact when prompted  Crisis, warmline and 65 number reviewed in group and planning after d/c  Anxious and tremulous  10/05/22 1100   Activity/Group Checklist   Group Other (Comment)  (short term problem solving)   Attendance Attended; Other (Comment)  (late)   Attendance Duration (min) 31-45   Interactions Did not interact   Affect/Mood Other (Comment)  (anxious)   Goals Achieved Able to listen to others

## 2022-10-05 NOTE — CASE MANAGEMENT
10/05/22 0850   Team Meeting   Meeting Type Daily Rounds   Initial Conference Date 10/05/22   Team Members Present   Team Members Present Physician;Nurse;;   Physician Team Member Dr Yovana Hogan Management Team Member [address] [place] [address] Work Team Member Cody Rod   Patient/Family Present   Patient Present No   Patient's Family Present No     Received PRN Trazodone, visible, negative thoughts, preoccupied with bowel/bladder functions - bladder scan yesterday, fair appetite, denies depression, 4/4 anxiety, med compliant, poor sleep, attends groups, tremors

## 2022-10-05 NOTE — NURSING NOTE
Ange Shown has been visible out and about on the unit  He is still obsessed with having a BM  He is worried about what will happen tonight if he takes his sleeping medication since he took Senekot earlier  "I am going to make a mess in the room because I will be too tired to get up"  Continue to monitor  Precautions maintained

## 2022-10-05 NOTE — NURSING NOTE
Pt alert and visible on the unit  Remains preoccupied with urinating and having a bowel movement  Received senekot for c/o constipation  Stated,"I don't know   should I take it?" Pt did not take abilify as ordered due to "not being able to pee"  Stated, " I have to talk to my doctor first"  Pt did take the medication after speaking with doctor  Remains very anxious, hand tremors noted and greater on left  Pt needing frequent redirection and support  Appetite is poor  Ate 25% of breakfast and lunch  Administered atarax 25mg po 0926 this am  Marginally effective  Pt continues to perseverate and obsess  Reported that his depression started after his marriage failed  Stated, "I got  and lost many jobs and everything bad just kept happening"  Support provided  Will continue to monitor and maintain q 7 min checks

## 2022-10-06 PROCEDURE — 99232 SBSQ HOSP IP/OBS MODERATE 35: CPT | Performed by: STUDENT IN AN ORGANIZED HEALTH CARE EDUCATION/TRAINING PROGRAM

## 2022-10-06 RX ORDER — LORAZEPAM 0.5 MG/1
0.25 TABLET ORAL 2 TIMES DAILY
Status: COMPLETED | OUTPATIENT
Start: 2022-10-06 | End: 2022-10-09

## 2022-10-06 RX ORDER — ARIPIPRAZOLE 5 MG/1
5 TABLET ORAL ONCE
Status: COMPLETED | OUTPATIENT
Start: 2022-10-06 | End: 2022-10-06

## 2022-10-06 RX ADMIN — HYDROXYZINE HYDROCHLORIDE 25 MG: 25 TABLET, FILM COATED ORAL at 03:24

## 2022-10-06 RX ADMIN — FUROSEMIDE 20 MG: 40 TABLET ORAL at 08:46

## 2022-10-06 RX ADMIN — TRAZODONE HYDROCHLORIDE 100 MG: 100 TABLET ORAL at 21:14

## 2022-10-06 RX ADMIN — MIRTAZAPINE 15 MG: 15 TABLET, FILM COATED ORAL at 21:14

## 2022-10-06 RX ADMIN — MELATONIN TAB 3 MG 3 MG: 3 TAB at 21:14

## 2022-10-06 RX ADMIN — FAMOTIDINE 40 MG: 20 TABLET ORAL at 17:45

## 2022-10-06 RX ADMIN — POTASSIUM CHLORIDE 20 MEQ: 1500 TABLET, EXTENDED RELEASE ORAL at 08:46

## 2022-10-06 RX ADMIN — ATORVASTATIN CALCIUM 40 MG: 40 TABLET, FILM COATED ORAL at 17:47

## 2022-10-06 RX ADMIN — WARFARIN SODIUM 4 MG: 2 TABLET ORAL at 17:46

## 2022-10-06 RX ADMIN — LORAZEPAM 0.25 MG: 0.5 TABLET ORAL at 21:14

## 2022-10-06 RX ADMIN — ARIPIPRAZOLE 5 MG: 5 TABLET ORAL at 08:46

## 2022-10-06 RX ADMIN — SERTRALINE HYDROCHLORIDE 150 MG: 100 TABLET ORAL at 08:46

## 2022-10-06 RX ADMIN — PANTOPRAZOLE SODIUM 40 MG: 40 TABLET, DELAYED RELEASE ORAL at 21:14

## 2022-10-06 RX ADMIN — ARIPIPRAZOLE 5 MG: 5 TABLET ORAL at 12:47

## 2022-10-06 RX ADMIN — FAMOTIDINE 40 MG: 20 TABLET ORAL at 08:46

## 2022-10-06 RX ADMIN — LORAZEPAM 0.25 MG: 1 TABLET ORAL at 10:08

## 2022-10-06 RX ADMIN — PANTOPRAZOLE SODIUM 40 MG: 40 TABLET, DELAYED RELEASE ORAL at 08:46

## 2022-10-06 RX ADMIN — CARVEDILOL 12.5 MG: 12.5 TABLET, FILM COATED ORAL at 08:46

## 2022-10-06 RX ADMIN — CARVEDILOL 12.5 MG: 12.5 TABLET, FILM COATED ORAL at 17:47

## 2022-10-06 NOTE — NURSING NOTE
Pt anxious but pleasant and med-compliant with good appetite and steady gait  Constricted affect  Good appetite at breakfast, poor at lunch  /98 in am, 125/100 after am meds  /71 sitting and 104/61 standing  Monitored for safety and support

## 2022-10-06 NOTE — PROGRESS NOTES
Progress Note - 2625 Neha Moses 58 y o  male MRN: [id number]  Unit/Bed#: [facility] 066-45 Encounter: [id number]    Assessment/Plan   Principal Problem:    SHIMON (generalized anxiety disorder)  Active Problems:    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 attack    Hypertension    GERD (gastroesophageal reflux disease)    Atrial fibrillation (HCC)    Class 2 obesity in adult    History of stroke    Antiphospholipid antibody with hypercoagulable state ([address] )    Hyperbilirubinemia    CORIE (obstructive sleep apnea)    PVD (peripheral vascular disease) ([facility])    Medical clearance for incarceration      Recommended Treatment:   No psychopharmacologic changes necessary at this moment with the exception of increasing Abilify to 10 mg daily for racing thoughts; will continue to assess daily for further optimization  Ordered 1 time orthostatics on patient due to patient states he is feeling unsteady when he stands up although there is no outward evidence of instability  Continue with pharmacotherapy, group therapy, milieu therapy and occupational therapy  Continue to assess for adverse medication side effects  Encourage Hans Miranda to participate in nonverbal forms of therapy including journaling and art/music therapy  Continue frequent safety checks and vitals per unit protocol  Continue to engage CM/SW to assist with collateral, disposition planning, and the implementation of an individualized, patient-centered plan of care  Continue medical management by medical team   Case discussed with treatment team     Legal Status: 201  ------------------------------------------------------------    Subjective: All documentation including nursing notes, medication history to ensure medication adherence on the unit, labs, and vitals were reviewed  Yang Mejia was evaluated this morning for continuity of care and no acute distress noted throughout the evaluation   Over the past 24 hours per nursing report, Zach Garcia has been cooperative on the unit and compliant with medications  Today, Zach Garcia is consenting for safety on the unit  Theta Rust reports feeling "dejected " Theta Rust notes having decreased sleep  Theta Rust states having a decent appetite  Theta Jose has been more redirectable, taking the medications as prescribed and reporting no side effects  Patient was seen and examined today  We discussed medication plan, bowel movement regimen, sleep and somatic complaints  When notified patient that he still live today and that this is a good thing because he was concerned that he was not going to make it through the night, patient states “I got poppy”  When reinforcing reality that he did not want over sleep or get “knocked out” by the medications that in fact he only slept 2 hours last night, patient dismissed  Patient states that there have been to any medication changes that he is not able to [facility] up” with them  Patient states he was heavily able to have a bowel movement today after he had some prune juice and continues to ask how he should go to the bathroom however he is less perseverative than yesterday  Patient is also not stuttering as much as he was yesterday, appears calmer  Patient states that he is able to get to sleep and is not able to stay asleep  Patient was reminded that he will have Atarax available to him if he wakes up in the middle the night and needs something to sleep  Patient is concerned about changing his sleeping medication regimen  Will keep the same  When discussing increase in Abilify, patient started to say that he has lots of somatic complaints with regards to Abilify  Patient was educated that none of these experiences that he is having owner side effects to the medication  Theta Rust denies suicidal ideations  Theta Rust denies homicidal ideations   Regarding hallucinations, Theta Rust denies and intermittently seems internally stimulated    PRNs overnight: atarax overnight  VS: Reviewed, within normal limits    Progress Toward Goals: slow improvement    Psychiatric Review of Systems:  Behavior over the last 24 hours:  improved  Sleep: insomnia  Appetite: normal  Medication side effects: Yes - Continues to have somatic complaints that are unrelated to medication   ROS: all other systems are negative    Vital signs in last 24 hours:  Temp:  [97 3 °F (36 3 °C)-97 4 °F (36 3 °C)] 97 3 °F (36 3 °C)  HR:  [] 110  Resp:  [18] 18  BP: (165-180)/() 170/98    Laboratory results:  I have personally reviewed all pertinent laboratory/tests results    Recent Results (from the past 48 hour(s))   Protime-INR    Collection Time: 10/05/22  5:54 AM   Result Value Ref Range    Protime 26 3 (H) 11 6 - 14 5 seconds    INR 2 36 (H) 0 84 - 1 19   TSH, 3rd generation with Free T4 reflex    Collection Time: 10/05/22  5:54 AM   Result Value Ref Range    TSH 3RD GENERATON 0 290 (L) 0 450 - 4 500 uIU/mL   T4, free    Collection Time: 10/05/22  5:54 AM   Result Value Ref Range    Free T4 1 80 (H) 0 76 - 1 46 ng/dL         Mental Status Evaluation:    Appearance:  disheveled, looks older than stated age, overweight, bearded, Overtly appearing  male, in no apparent acute distress, good eye contact   Behavior:  cooperative   Speech:  normal rate and volume with less stuttering, less repeating phrases   Mood:  "Dejected"   Affect:  constricted, Calmer than previous but still anxious   Thought Process:  Mostly organized   Associations: circumstantial associations   Thought Content:  negative thinking, ruminating thoughts, preoccupied   Perceptual Disturbances: Denies auditory or visual hallucinations and Intermittently seems internally stimulated   Risk Potential: Suicidal ideation - None at present  Homicidal ideation - None at present  Potential for aggression - Not at present   Sensorium:  oriented to person, place and time/date   Memory:  recent and remote memory grossly intact   Consciousness:  alert and awake   Attention/Concentration: attention span and concentration are age appropriate   Insight:  poor   Judgment: poor   Gait/Station: slow gait, stable gait   Motor Activity: no abnormal movements       Current Medications:  Current Facility-Administered Medications   Medication Dose Route Frequency Provider Last Rate   • acetaminophen  650 mg Oral Q4H PRN Danica Sidhu MD     • acetaminophen  650 mg Oral Q4H PRN Danica Sidhu MD     • acetaminophen  975 mg Oral Q6H PRN Danica Sidhu MD     • aluminum-magnesium hydroxide-simethicone  30 mL Oral Q4H PRN Danica Sidhu MD     • ARIPiprazole  5 mg Oral Daily Bertram Martin DO     • atorvastatin  40 mg Oral Daily With Dinner Danica Sidhu MD     • benztropine  1 mg Intramuscular Q4H PRN Max 6/day Danica Sidhu MD     • benztropine  0 5 mg Oral Q4H PRN Max 6/day Danica Sidhu MD     • carvedilol  12 5 mg Oral BID With Meals Danica Sidhu MD     • famotidine  40 mg Oral BID Pierre Luna PA-C     • furosemide  20 mg Oral Daily Laura Apple MD     • haloperidol lactate  5 mg Intramuscular Q4H PRN Max 4/day Danica Sidhu MD     • hydrOXYzine HCL  25 mg Oral Q6H PRN Max 4/day Danica Sidhu MD     • LORazepam  1 mg Intramuscular Q6H PRN Max 3/day Danica Sidhu MD     • LORazepam  0 25 mg Oral BID Bertram Martin DO     • melatonin  3 mg Oral HS Danica Sidhu MD     • mirtazapine  15 mg Oral HS Bertram Martin DO     • pantoprazole  40 mg Oral BID Pierre Luna PA-C     • polyethylene glycol  17 g Oral Daily PRN Danica Sidhu MD     • potassium chloride  20 mEq Oral Daily Danica Sidhu MD     • propranolol  5 mg Oral Q8H PRN Danica Sidhu MD     • risperiDONE  0 25 mg Oral Q4H PRN Max 6/day Danica Sidhu MD     • risperiDONE  0 5 mg Oral Q4H PRN Max 3/day Danica Sidhu MD     • risperiDONE  1 mg Oral Q2H PRN Max 3/day Danica Sidhu MD     • senna-docusate sodium  1 tablet Oral Daily PRN Dl Olivera MD     • sertraline  150 mg Oral Daily Amparo Nation DO     • traZODone  100 mg Oral HS Amparo Nation DO     • warfarin  4 mg Oral Daily (warfarin) Zachary Zuleta MD         Suicide/Homicide Risk Assessment:  Risk of Harm to Self:   Based on today's assessment, Yanick Frazier presents the following risk of harm to self: minimal    Risk of Harm to Others:  Based on today's assessment, Yanick Frazier presents the following risk of harm to others: minimal    The following interventions are recommended: behavioral checks every 7 minutes, continued hospitalization on locked unit    Behavioral Health Medications: All current active meds have been reviewed  Changes as in plan section above  Risks, benefits and possible side effects of Medications:   Risks, benefits, and possible side effects of medications explained to patient and patient verbalizes understanding  Counseling / Coordination of Care:  Patient's progress discussed with staff in treatment team meeting  Medications, treatment progress and treatment plan reviewed with patient  Amparo Nation 10/06/22  Psychiatry Resident, PGY-II    This note was completed in part utilizing [employer] Software  Grammatical, translation, syntax errors, random word insertions, spelling mistakes, and incomplete sentences may be an occasional consequence of this system secondary to software limitations with voice recognition, ambient noise, and hardware issues  If you have any questions or concerns about the content, text, or information contained within the body of this dictation, please contact the provider for clarification

## 2022-10-06 NOTE — NURSING NOTE
Pt visible on unit, pacing, no interaction with peers noted  Pt reports depression 5-10 and anxiety 2-4  Pt denies other s/s at this time  Pt reporting "smoke in the bathroom and halls from the dryer"  Pt redirected and oriented  Pt preoccupied with medication saying "I don't know what to expect with all of these heavy doses"  Pt counseled

## 2022-10-06 NOTE — NURSING NOTE
Patient restless and awake several times throughout the night  Asked for sleeping medication at 0330 then stated he wanted something for anxiety  Stated he wanted medication because he knew he would be anxious later in the morning  Sat at side of bed with extreme left arm tremors saying he needed medication  Patient medicated with Atarax and encouraged to try to rest in room  Patient currently sitting on bedside saying he thinks he should not have taken the medicine so early  No tremors or shaking noted

## 2022-10-06 NOTE — PLAN OF CARE
Problem: Ineffective Coping  Goal: Patient/Family verbalizes awareness of resources  Outcome: Progressing  Goal: Understands least restrictive measures  Description: Interventions:  - Utilize least restrictive behavior  Outcome: Progressing  Goal: Free from restraint events  Description: - Utilize least restrictive measures   - Provide behavioral interventions   - Redirect inappropriate behaviors   Outcome: Progressing     Problem: Anxiety  Goal: Anxiety is at manageable level  Description: Interventions:  - Assess and monitor patient's anxiety level  - Monitor for signs and symptoms (heart palpitations, chest pain, shortness of breath, headaches, nausea, feeling jumpy, restlessness, irritable, apprehensive)  - Collaborate with interdisciplinary team and initiate plan and interventions as ordered    - [place] patient to unit/surroundings  - Explain treatment plan  - Encourage participation in care  - Encourage verbalization of concerns/fears  - Identify coping mechanisms  - Assist in developing anxiety-reducing skills  - Administer/offer alternative therapies  - Limit or eliminate stimulants  Outcome: Progressing     Problem: Ineffective Coping  Goal: Identifies ineffective coping skills  Outcome: Not Progressing  Goal: Identifies healthy coping skills  Outcome: Not Progressing  Goal: Demonstrates healthy coping skills  Outcome: Not Progressing

## 2022-10-06 NOTE — PROGRESS NOTES
Pt attended positive traits and self esteem group  Pt was able to self express and more focused on group discussion  Pt was able to provide a positive trait however had some self doubt in terms that he used to be this    "confident" and move hands  Pt did state he is kind  Pt initiated and expressed interest in engaging in more activities on the unit and brainstormed with him on his interests (reading, journaling, crossword puzzles and playing cards)  Pt social and supportive with peers with improved eye contact and less restless  10/06/22 1330   Activity/Group Checklist   Group Other (Comment)  (positive traits and self esteem)   Attendance Attended   Attendance Duration (min) 31-45   Interactions Interacted appropriately   Affect/Mood Appropriate   Goals Achieved Increased hopefulness; Identified feelings; Discussed coping strategies; Able to manage/cope with feelings; Able to listen to others; Able to engage in interactions

## 2022-10-06 NOTE — CASE MANAGEMENT
10/06/22 1222   Team Meeting   Meeting Type Daily Rounds   Initial Conference Date 10/06/22   Team Members Present   Team Members Present Physician;Nurse;;; Occupational Therapist   Physician Team Member Dr De Chen Management Team Member [address] [facility] Work Team Member Dori   OT Team Member Claudell Romp   Patient/Family Present   Patient Present No   Patient's Family Present No     Restless, med compliant, visible, 5/10 depression, 2/4 anxiety, preoccupied with meds and bowels, attended group, sertraline increased

## 2022-10-07 ENCOUNTER — TELEPHONE (OUTPATIENT)
Dept: PSYCHIATRY | Facility: CLINIC | Age: 62
End: 2022-10-07

## 2022-10-07 PROBLEM — E44.1 MILD PROTEIN-CALORIE MALNUTRITION (HCC): Status: ACTIVE | Noted: 2022-10-07

## 2022-10-07 LAB
INR PPP: 3.03 (ref 0.84–1.19)
PROTHROMBIN TIME: 31.9 SECONDS (ref 11.6–14.5)

## 2022-10-07 PROCEDURE — 99232 SBSQ HOSP IP/OBS MODERATE 35: CPT | Performed by: STUDENT IN AN ORGANIZED HEALTH CARE EDUCATION/TRAINING PROGRAM

## 2022-10-07 PROCEDURE — 85610 PROTHROMBIN TIME: CPT | Performed by: PHYSICIAN ASSISTANT

## 2022-10-07 RX ORDER — ARIPIPRAZOLE 5 MG/1
5 TABLET ORAL ONCE
Status: COMPLETED | OUTPATIENT
Start: 2022-10-07 | End: 2022-10-07

## 2022-10-07 RX ORDER — ARIPIPRAZOLE 10 MG/1
10 TABLET ORAL DAILY
Status: DISCONTINUED | OUTPATIENT
Start: 2022-10-08 | End: 2022-10-11

## 2022-10-07 RX ADMIN — ATORVASTATIN CALCIUM 40 MG: 40 TABLET, FILM COATED ORAL at 17:33

## 2022-10-07 RX ADMIN — ARIPIPRAZOLE 5 MG: 5 TABLET ORAL at 09:39

## 2022-10-07 RX ADMIN — TRAZODONE HYDROCHLORIDE 100 MG: 100 TABLET ORAL at 22:11

## 2022-10-07 RX ADMIN — ARIPIPRAZOLE 5 MG: 5 TABLET ORAL at 12:03

## 2022-10-07 RX ADMIN — HYDROXYZINE HYDROCHLORIDE 25 MG: 25 TABLET, FILM COATED ORAL at 05:51

## 2022-10-07 RX ADMIN — SERTRALINE HYDROCHLORIDE 150 MG: 100 TABLET ORAL at 09:37

## 2022-10-07 RX ADMIN — LORAZEPAM 0.25 MG: 0.5 TABLET ORAL at 22:10

## 2022-10-07 RX ADMIN — HYDROXYZINE HYDROCHLORIDE 25 MG: 25 TABLET, FILM COATED ORAL at 16:09

## 2022-10-07 RX ADMIN — FUROSEMIDE 20 MG: 40 TABLET ORAL at 09:37

## 2022-10-07 RX ADMIN — LORAZEPAM 0.25 MG: 0.5 TABLET ORAL at 09:42

## 2022-10-07 RX ADMIN — MIRTAZAPINE 15 MG: 15 TABLET, FILM COATED ORAL at 22:11

## 2022-10-07 RX ADMIN — MELATONIN TAB 3 MG 3 MG: 3 TAB at 22:11

## 2022-10-07 RX ADMIN — FAMOTIDINE 40 MG: 20 TABLET ORAL at 17:33

## 2022-10-07 RX ADMIN — CARVEDILOL 12.5 MG: 12.5 TABLET, FILM COATED ORAL at 17:33

## 2022-10-07 RX ADMIN — PANTOPRAZOLE SODIUM 40 MG: 40 TABLET, DELAYED RELEASE ORAL at 09:37

## 2022-10-07 RX ADMIN — CARVEDILOL 12.5 MG: 12.5 TABLET, FILM COATED ORAL at 09:37

## 2022-10-07 RX ADMIN — FAMOTIDINE 40 MG: 20 TABLET ORAL at 09:37

## 2022-10-07 RX ADMIN — POTASSIUM CHLORIDE 20 MEQ: 1500 TABLET, EXTENDED RELEASE ORAL at 09:37

## 2022-10-07 RX ADMIN — PANTOPRAZOLE SODIUM 40 MG: 40 TABLET, DELAYED RELEASE ORAL at 22:11

## 2022-10-07 RX ADMIN — WARFARIN SODIUM 4 MG: 2 TABLET ORAL at 17:33

## 2022-10-07 NOTE — CASE MANAGEMENT
10/07/22 0835   Team Meeting   Meeting Type Daily Rounds   Initial Conference Date 10/07/22   Team Members Present   Team Members Present Physician;Nurse;;Occupational Therapist   Physician Team Member Dr Sara Andrade Team Member DEVIKA [facility] Management Team Member Bill Gomez   OT Team Member Lizeth Melara   Patient/Family Present   Patient Present No   Patient's Family Present No     Visible, anxious, pleasant, slept, attends group, received PRN Atarax, denies s/s, improving, less preoccupied with bowels

## 2022-10-07 NOTE — MALNUTRITION/BMI
This medical record reflects one or more clinical indicators suggestive of malnutrition     Malnutrition Findings:   Adult Malnutrition type: Unknown (comment)  Adult Degree of Malnutrition: Malnutrition of mild degree  Malnutrition Characteristics: Inadequate energy, Weight loss        360 Statement: Pt presents with mild malnutrition related to anxiety and depression as evidenced by significant weight loss of 14 8% x3 mnths (242# 7/30/22, 211# 9/28/22, 206# 10/7/22), oral intake indicative of meeting <75% of estimated needs for the past 2 months; to be treated with regular diet, will monitor acceptance of supplements    BMI Findings: Body mass index is 31 34 kg/m²  See Nutrition note dated 10/7/22 for additional details  Completed nutrition assessment is viewable in the nutrition documentation

## 2022-10-07 NOTE — PLAN OF CARE
Pt  With notable increase in social interaction through structure games/groups today    Problem: Ineffective Coping  Goal: Participates in unit activities  Description: Interventions:  - Provide therapeutic environment   - Provide required programming   - Redirect inappropriate behaviors   Outcome: Progressing

## 2022-10-07 NOTE — TELEPHONE ENCOUNTER
Patient's sister Cassy Barber ([phone]) LVM requesting to talk with you regarding her brother  He is currently in the hospital  She is included on a communication consent

## 2022-10-07 NOTE — PLAN OF CARE
Problem: Ineffective Coping  Goal: Identifies ineffective coping skills  Outcome: Progressing  Goal: Identifies healthy coping skills  Outcome: Progressing  Goal: Demonstrates healthy coping skills  Outcome: Progressing  Goal: Patient/Family participate in treatment and DC plans  Description: Interventions:  - Provide therapeutic environment  Outcome: Progressing  Goal: Patient/Family verbalizes awareness of resources  Outcome: Progressing  Goal: Understands least restrictive measures  Description: Interventions:  - Utilize least restrictive behavior  Outcome: Progressing  Goal: Free from restraint events  Description: - Utilize least restrictive measures   - Provide behavioral interventions   - Redirect inappropriate behaviors   Outcome: Progressing     Problem: Anxiety  Goal: Anxiety is at manageable level  Description: Interventions:  - Assess and monitor patient's anxiety level  - Monitor for signs and symptoms (heart palpitations, chest pain, shortness of breath, headaches, nausea, feeling jumpy, restlessness, irritable, apprehensive)  - Collaborate with interdisciplinary team and initiate plan and interventions as ordered    - [place] patient to unit/surroundings  - Explain treatment plan  - Encourage participation in care  - Encourage verbalization of concerns/fears  - Identify coping mechanisms  - Assist in developing anxiety-reducing skills  - Administer/offer alternative therapies  - Limit or eliminate stimulants  Outcome: Progressing

## 2022-10-07 NOTE — NURSING NOTE
Patient is cooperative he rates anxiety 3/4 and depression 5/10  He appears anxious in affect, he is tremulous, hesitant with all decisions and somewhat suspicious  Anxiety does appear somewhat decreased from last week  He is social in milieu

## 2022-10-07 NOTE — PLAN OF CARE
Problem: DISCHARGE PLANNING  Goal: Discharge to home or other facility with appropriate resources  Description: INTERVENTIONS:  - Identify barriers to discharge w/patient and caregiver  - Arrange for needed discharge resources and transportation as appropriate  - Identify discharge learning needs (meds, wound care, etc )  - Arrange for interpretive services to assist at discharge as needed  - Refer to Case Management Department for coordinating discharge planning if the patient needs post-hospital services based on physician/advanced practitioner order or complex needs related to functional status, cognitive ability, or social support system  Outcome: Progressing     Problem: Ineffective Coping  Goal: Identifies ineffective coping skills  Outcome: Progressing  Goal: Identifies healthy coping skills  Outcome: Progressing  Goal: Demonstrates healthy coping skills  Outcome: Progressing  Goal: Patient/Family participate in treatment and DC plans  Description: Interventions:  - Provide therapeutic environment  Outcome: Progressing  Goal: Patient/Family verbalizes awareness of resources  Outcome: Progressing  Goal: Understands least restrictive measures  Description: Interventions:  - Utilize least restrictive behavior  Outcome: Progressing  Goal: Free from restraint events  Description: - Utilize least restrictive measures   - Provide behavioral interventions   - Redirect inappropriate behaviors   Outcome: Progressing     Problem: Anxiety  Goal: Anxiety is at manageable level  Description: Interventions:  - Assess and monitor patient's anxiety level  - Monitor for signs and symptoms (heart palpitations, chest pain, shortness of breath, headaches, nausea, feeling jumpy, restlessness, irritable, apprehensive)  - Collaborate with interdisciplinary team and initiate plan and interventions as ordered    - [place] patient to unit/surroundings  - Explain treatment plan  - Encourage participation in care  - Encourage verbalization of concerns/fears  - Identify coping mechanisms  - Assist in developing anxiety-reducing skills  - Administer/offer alternative therapies  - Limit or eliminate stimulants  Outcome: Progressing     Problem: Nutrition/Hydration-ADULT  Goal: Nutrient/Hydration intake appropriate for improving, restoring or maintaining nutritional needs  Description: Monitor and assess patient's nutrition/hydration status for malnutrition  Collaborate with interdisciplinary team and initiate plan and interventions as ordered  Monitor patient's weight and dietary intake as ordered or per policy  Utilize nutrition screening tool and intervene as necessary  Determine patient's food preferences and provide high-protein, high-caloric foods as appropriate       INTERVENTIONS:  - Monitor oral intake, urinary output, labs, and treatment plans  - Assess nutrition and hydration status and recommend course of action  - Evaluate amount of meals eaten  - Assist patient with eating if necessary   - Allow adequate time for meals  - Recommend/ encourage appropriate diets, oral nutritional supplements, and vitamin/mineral supplements  - Order, calculate, and assess calorie counts as needed  - Recommend, monitor, and adjust tube feedings and TPN/PPN based on assessed needs  - Assess need for intravenous fluids  - Provide specific nutrition/hydration education as appropriate  - Include patient/family/caregiver in decisions related to nutrition  Outcome: Progressing

## 2022-10-07 NOTE — NURSING NOTE
Pt visible in dayroom socializing with peers  Pt pleasant on approach  Currently denies s/s reporting "I am not bad  Having a good day"  Pt reports "I haven't shaken all day"  No tremors noted at this time  Pt requested all medication at 2100  During HS medication pass pt saying "oh no this is too early, this isn't going to work"  Pt educated on 2000 medication administration order time  Pt understanding  Pt approached nursing station with noted tremor at stutter  Pt reports "I need something, it's going to get bad"  Pt reports being anxious  Pt saying "do you think it's a good idea  I think it may because I haven't had medication " Pt encouraged to utilize coping mechanisms  Atarax 25 mg administered at 0551

## 2022-10-07 NOTE — PROGRESS NOTES
Progress Note - 2625 Neha Moses 58 y o  male MRN: [id number]  Unit/Bed#: [facility] 838-03 Encounter: [id number]    Assessment/Plan   Principal Problem:    SHIMON (generalized anxiety disorder)  Active Problems:    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 attack    Hypertension    GERD (gastroesophageal reflux disease)    Atrial fibrillation (HCC)    Class 2 obesity in adult    History of stroke    Antiphospholipid antibody with hypercoagulable state ([address] )    Hyperbilirubinemia    CORIE (obstructive sleep apnea)    PVD (peripheral vascular disease) ([facility])    Medical clearance for incarceration      Recommended Treatment:   No psychopharmacologic changes necessary at this moment; will continue to assess daily for further optimization  Called sister and gave updates    Continue with pharmacotherapy, group therapy, milieu therapy and occupational therapy  Continue to assess for adverse medication side effects  Encourage Hans Vasquezbuddy Ruben to participate in nonverbal forms of therapy including journaling and art/music therapy  Continue frequent safety checks and vitals per unit protocol  Continue to engage CM/SW to assist with collateral, disposition planning, and the implementation of an individualized, patient-centered plan of care  Continue medical management by medical team   Case discussed with treatment team     Legal Status: 201  ------------------------------------------------------------    Subjective: All documentation including nursing notes, medication history to ensure medication adherence on the unit, labs, and vitals were reviewed  Hardeep Fung was evaluated this morning for continuity of care and no acute distress noted throughout the evaluation  Over the past 24 hours per nursing report, Hardeep Fung has been cooperative on the unit and compliant with medications  Today, Hardeep Fung is consenting for safety on the unit  Hardeep Fung reports feeling "bad today " Hardeep Fung notes having good sleep  Steve Christian states having a good appetite  Steve Christian has been taking the medications as prescribed and reporting no side effects  Patient was seen and examined today  We discussed medication plan, discharge plan, and reinforcing to patient that medication will take some time to work  Patient believes that he is done “a 180” and that while he had a good day yesterday he is not having a good day today  Provided encouragement and support  Patient requires multiple redirections however is overall less perseverative about bowel movements and is generally appearing much calmer  Patient still has increase tremor when he has acute worsening of anxiety  Encourage coping skills    Steve Christian denies suicidal ideations  Steve Christian denies homicidal ideations  Regarding hallucinations, Steve Christian denies and does not appear to be responding to internal stimuli    PRNs overnight:  Atarax for sleep   VS: Reviewed, within normal limits    Progress Toward Goals: slow improvement    Psychiatric Review of Systems:  Behavior over the last 24 hours:  improved  Sleep: normal  Appetite: normal  Medication side effects: No   ROS: all other systems are negative    Vital signs in last 24 hours:  Temp:  [97 5 °F (36 4 °C)-97 8 °F (36 6 °C)] 97 8 °F (36 6 °C)  HR:  [] 160  Resp:  [16-18] 16  BP: (104-177)/() 177/99    Laboratory results:  I have personally reviewed all pertinent laboratory/tests results  No results found for this or any previous visit (from the past 48 hour(s))        Mental Status Evaluation:    Appearance:  casually dressed, marginal hygiene, overweight, bearded, Overtly appearing  male, in no apparent acute distress, good eye contact   Behavior:  cooperative   Speech:  normal rate and volume minimal stuttering   Mood:  "Bad today"   Affect:  constricted   Thought Process:  perseverative, negative thinking   Associations: circumstantial associations   Thought Content:  no overt delusions   Perceptual Disturbances: Denies auditory or visual hallucinations and Does not appear to be responding to internal stimuli   Risk Potential: Suicidal ideation - None at present  Homicidal ideation - None at present  Potential for aggression - Not at present   Sensorium:  oriented to person, place and time/date   Memory:  recent and remote memory grossly intact   Consciousness:  alert and awake   Attention/Concentration: attention span and concentration are age appropriate   Insight:  limited   Judgment: limited   Gait/Station: slow gait   Motor Activity: no abnormal movements       Current Medications:  Current Facility-Administered Medications   Medication Dose Route Frequency Provider Last Rate   • acetaminophen  650 mg Oral Q4H PRN Jose Helms MD     • acetaminophen  650 mg Oral Q4H PRN Jose Helms MD     • acetaminophen  975 mg Oral Q6H PRN Jose Helms MD     • aluminum-magnesium hydroxide-simethicone  30 mL Oral Q4H PRN Jose Helms MD     • ARIPiprazole  5 mg Oral Daily Chandler Fernandes DO     • atorvastatin  40 mg Oral Daily With Dinner Jose Helms MD     • benztropine  1 mg Intramuscular Q4H PRN Max 6/day Jose Helms MD     • benztropine  0 5 mg Oral Q4H PRN Max 6/day Jose Helms MD     • carvedilol  12 5 mg Oral BID With Meals Jose Helms MD     • famotidine  40 mg Oral BID Tammy Jose PA-C     • furosemide  20 mg Oral Daily Laura Zavala MD     • haloperidol lactate  5 mg Intramuscular Q4H PRN Max 4/day Jose Helms MD     • hydrOXYzine HCL  25 mg Oral Q6H PRN Max 4/day Jose Helms MD     • LORazepam  1 mg Intramuscular Q6H PRN Max 3/day Jose Helms MD     • LORazepam  0 25 mg Oral BID Chandler Fernandes DO     • melatonin  3 mg Oral HS Jose Helms MD     • mirtazapine  15 mg Oral HS Chandler Fernandes DO     • pantoprazole  40 mg Oral BID Tammy Jose PA-C     • polyethylene glycol  17 g Oral Daily PRN Jose Helms MD     • potassium chloride  20 mEq Oral Daily Laura Garcia MD     • propranolol  5 mg Oral Q8H PRN Candy Carranza MD     • risperiDONE  0 25 mg Oral Q4H PRN Max 6/day Candy Carranza MD     • risperiDONE  0 5 mg Oral Q4H PRN Max 3/day Candy Carranza MD     • risperiDONE  1 mg Oral Q2H PRN Max 3/day Candy Carranza MD     • senna-docusate sodium  1 tablet Oral Daily PRN Candy Carranza MD     • sertraline  150 mg Oral Daily Nhi Plummer DO     • traZODone  100 mg Oral HS Nhi Plummer DO     • warfarin  4 mg Oral Daily (warfarin) Sy Reeves MD         Suicide/Homicide Risk Assessment:  Risk of Harm to Self:   Based on today's assessment, Claudette Jones presents the following risk of harm to self: minimal    Risk of Harm to Others:  Based on today's assessment, Claudette Jones presents the following risk of harm to others: minimal    The following interventions are recommended: behavioral checks every 7 minutes, continued hospitalization on locked unit    Behavioral Health Medications: All current active meds have been reviewed  Changes as in plan section above  Risks, benefits and possible side effects of Medications:   Risks, benefits, and possible side effects of medications explained to patient and patient verbalizes understanding  Counseling / Coordination of Care:  Patient's progress discussed with staff in treatment team meeting  Medications, treatment progress and treatment plan reviewed with patient  Nhi Plummer 10/07/22  Psychiatry Resident, PGY-II    This note was completed in part utilizing M-Modal Fluency Direct Software  Grammatical, translation, syntax errors, random word insertions, spelling mistakes, and incomplete sentences may be an occasional consequence of this system secondary to software limitations with voice recognition, ambient noise, and hardware issues   If you have any questions or concerns about the content, text, or information contained within the body of this dictation, please contact the provider for clarification

## 2022-10-07 NOTE — PROGRESS NOTES
Pt  Spontaneously joining groups today and fully interacting appropriately with peers  10/07/22 1000 10/07/22 1330   Activity/Group Checklist   Group Community meeting  (Delectable memory game with peers ) Life Skills  (activel leisure game with peer"battleship")   Attendance Did not attend Attended   Attendance Duration (min) 16-30 46-60   Interactions Interacted appropriately  (Pt  able to manipulate the cards in game with minimal tremors ) Interacted appropriately   Affect/Mood Appropriate;Normal range;Bright Appropriate;Calm;Bright;Normal range   Goals Achieved Able to engage in interactions; Able to listen to others Able to listen to others; Able to engage in interactions; Increased hopefulness

## 2022-10-08 PROCEDURE — 99232 SBSQ HOSP IP/OBS MODERATE 35: CPT | Performed by: PSYCHIATRY & NEUROLOGY

## 2022-10-08 RX ADMIN — MELATONIN TAB 3 MG 3 MG: 3 TAB at 21:23

## 2022-10-08 RX ADMIN — POTASSIUM CHLORIDE 20 MEQ: 1500 TABLET, EXTENDED RELEASE ORAL at 09:21

## 2022-10-08 RX ADMIN — TRAZODONE HYDROCHLORIDE 100 MG: 100 TABLET ORAL at 21:24

## 2022-10-08 RX ADMIN — MIRTAZAPINE 15 MG: 15 TABLET, FILM COATED ORAL at 21:24

## 2022-10-08 RX ADMIN — PANTOPRAZOLE SODIUM 40 MG: 40 TABLET, DELAYED RELEASE ORAL at 21:24

## 2022-10-08 RX ADMIN — FAMOTIDINE 40 MG: 20 TABLET ORAL at 17:25

## 2022-10-08 RX ADMIN — PANTOPRAZOLE SODIUM 40 MG: 40 TABLET, DELAYED RELEASE ORAL at 09:21

## 2022-10-08 RX ADMIN — FUROSEMIDE 20 MG: 40 TABLET ORAL at 09:21

## 2022-10-08 RX ADMIN — LORAZEPAM 0.25 MG: 0.5 TABLET ORAL at 21:23

## 2022-10-08 RX ADMIN — FAMOTIDINE 40 MG: 20 TABLET ORAL at 09:21

## 2022-10-08 RX ADMIN — CARVEDILOL 12.5 MG: 12.5 TABLET, FILM COATED ORAL at 09:23

## 2022-10-08 RX ADMIN — WARFARIN SODIUM 4 MG: 2 TABLET ORAL at 17:25

## 2022-10-08 RX ADMIN — LORAZEPAM 0.25 MG: 0.5 TABLET ORAL at 09:20

## 2022-10-08 RX ADMIN — CARVEDILOL 12.5 MG: 12.5 TABLET, FILM COATED ORAL at 17:25

## 2022-10-08 RX ADMIN — ARIPIPRAZOLE 10 MG: 10 TABLET ORAL at 09:21

## 2022-10-08 RX ADMIN — SERTRALINE HYDROCHLORIDE 150 MG: 100 TABLET ORAL at 09:21

## 2022-10-08 RX ADMIN — ATORVASTATIN CALCIUM 40 MG: 40 TABLET, FILM COATED ORAL at 17:25

## 2022-10-08 NOTE — PROGRESS NOTES
Progress Note - Poppy [age] y o  male MRN: [id number]   Unit/Bed#: [facility] 110-53 Encounter: [id number]      Subjective:  Patient's chart reviewed and case discussed with the nurse  The patient was seen in the milieu today  The patient reports he feels  "little bit depressed”  He also reports has anxiety  Though reports is been better compared to when he 1st came into the unit  Denies any SI or HI  Reports slept not good last night  Denied any auditory or visual hallucination  Does not endorse any paranoia delusional ideation during the meeting  Reports compliant with the medication and denies any side effect  The patient was noted to have tremors in his hands  As per the nurse the patient last evening complained of having anxiety 3/for and depression 5/10  Anxious in affect tremor list   Though anxiety has been better as per the nurse  ROS: all other systems are negative    Mental Status Evaluation:    Appearance:  casually dressed   Behavior:  cooperative   Speech:  normal rate, normal volume, normal pitch   Mood:  anxious, mildly depressed   Affect:  constricted   Thought Process:  negative thinking   Associations: intact associations   Thought Content:  no overt delusions   Perceptual Disturbances: denies auditory or visual hallucinations when asked   Risk Potential: Suicidal ideation - None  Homicidal ideation - None  Potential for aggression - No   Sensorium:  oriented to person, place and time/date   Memory:  recent and remote memory grossly intact   Consciousness:  alert and awake   Attention: poor concentration   Insight:  limited   Judgment: limited   Gait/Station: slow gait   Motor Activity: Tremor of hands noted       Vital signs in last 24 hours:    Temp:  [97 5 °F (36 4 °C)-98 °F (36 7 °C)] 98 °F (36 7 °C)  HR:  [90-99] 99  Resp:  [16-20] 16  BP: (126-160)/(93-97) 158/97    Laboratory results: I have personally reviewed all pertinent laboratory/tests results  Progress Toward Goals: improving gradually    Assessment/Plan  as per patient evaluation and the collateral information from the staff the patient anxiety depression has been better  No SI or HI  No psychotic symptoms endorsed today  The plan is to continue with the current medication with no changes  Will continue monitor the patient for his mood, behavior, safety, sleep and response to medication  Principal Problem:    SHIMON (generalized anxiety disorder)  Active Problems:    Hypertension    GERD (gastroesophageal reflux disease)    Atrial fibrillation ([facility])    Class 2 obesity in adult    History of stroke    [facility]iph[facility]    Hyperbilirubinemia    CORIE (obstructive sleep apnea)    PVD (peripheral vascular disease) ([facility])    Medical clearance for incarceration    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 attack    Mild protein-calorie malnutrition ([facility] )    Recommended Treatment:     Planned medication and treatment changes:     All current active medications have been reviewed  Encourage group therapy, milieu therapy and occupational therapy  Behavioral Health checks every 7 minutes  Continue current medications:  Current Facility-Administered Medications   Medication Dose Route Frequency Provider Last Rate   • acetaminophen  650 mg Oral Q4H PRN Dominga Zurita MD     • acetaminophen  650 mg Oral Q4H PRN Dominga Zurita MD     • acetaminophen  975 mg Oral Q6H PRN Dominga Zurita MD     • aluminum-magnesium hydroxide-simethicone  30 mL Oral Q4H PRN Dominga Zurita MD     • ARIPiprazole  10 mg Oral Daily Inessa Mathews DO     • atorvastatin  40 mg Oral Daily With Bj Burleson MD     • benztropine  1 mg Intramuscular Q4H PRN Max 6/day Dominga Zurita MD     • benztropine  0 5 mg Oral Q4H PRN Max 6/day Dominga Zurita MD     • carvedilol  12 5 mg Oral BID With Meals Dominga Zurita MD     • famotidine  40 mg Oral BID Karina Jose PA-C     • furosemide  20 mg Oral Daily Lindy Oneill MD     • haloperidol lactate  5 mg Intramuscular Q4H PRN Max 4/day Lindy Oneill MD     • hydrOXYzine HCL  25 mg Oral Q6H PRN Max 4/day Lindy Oneill MD     • LORazepam  1 mg Intramuscular Q6H PRN Max 3/day Lindy Oneill MD     • LORazepam  0 25 mg Oral BID Santoshine Jim, DO     • melatonin  3 mg Oral HS Lindy Oneill MD     • mirtazapine  15 mg Oral HS Mary Fernandez, DO     • pantoprazole  40 mg Oral BID Karina Jose PA-C     • polyethylene glycol  17 g Oral Daily PRN Lindy Oneill MD     • potassium chloride  20 mEq Oral Daily Laura Gentile MD     • propranolol  5 mg Oral Q8H PRN Lindy Oneill MD     • risperiDONE  0 25 mg Oral Q4H PRN Max 6/day Laura Gentile MD     • risperiDONE  0 5 mg Oral Q4H PRN Max 3/day Lindy Oneill MD     • risperiDONE  1 mg Oral Q2H PRN Max 3/day Lindy Oneill MD     • senna-docusate sodium  1 tablet Oral Daily PRN Lindy Oneill MD     • sertraline  150 mg Oral Daily Mary Fernandez DO     • traZODone  100 mg Oral HS Mary Fernandez, DO     • warfarin  4 mg Oral Daily (warfarin) Catarina Houston MD         Risks / Benefits of Treatment:    Risks, benefits, and possible side effects of medications explained to patient and patient verbalizes understanding and agreement for treatment  Counseling / Coordination of Care:    Patient's progress discussed with staff in treatment team meeting  Medications, treatment progress and treatment plan reviewed with patient      Richard Ayala MD 10/08/22

## 2022-10-08 NOTE — PLAN OF CARE
Problem: Ineffective Coping  Goal: Identifies ineffective coping skills  Outcome: Progressing  Goal: Identifies healthy coping skills  Outcome: Progressing  Goal: Demonstrates healthy coping skills  Outcome: Progressing  Goal: Patient/Family participate in treatment and DC plans  Description: Interventions:  - Provide therapeutic environment  Outcome: Progressing  Goal: Patient/Family verbalizes awareness of resources  Outcome: Progressing  Goal: Understands least restrictive measures  Description: Interventions:  - Utilize least restrictive behavior  Outcome: Progressing  Goal: Free from restraint events  Description: - Utilize least restrictive measures   - Provide behavioral interventions   - Redirect inappropriate behaviors   Outcome: Progressing     Problem: Anxiety  Goal: Anxiety is at manageable level  Description: Interventions:  - Assess and monitor patient's anxiety level  - Monitor for signs and symptoms (heart palpitations, chest pain, shortness of breath, headaches, nausea, feeling jumpy, restlessness, irritable, apprehensive)  - Collaborate with interdisciplinary team and initiate plan and interventions as ordered  - [place] patient to unit/surroundings  - Explain treatment plan  - Encourage participation in care  - Encourage verbalization of concerns/fears  - Identify coping mechanisms  - Assist in developing anxiety-reducing skills  - Administer/offer alternative therapies  - Limit or eliminate stimulants  Outcome: Progressing     Problem: Nutrition/Hydration-ADULT  Goal: Nutrient/Hydration intake appropriate for improving, restoring or maintaining nutritional needs  Description: Monitor and assess patient's nutrition/hydration status for malnutrition  Collaborate with interdisciplinary team and initiate plan and interventions as ordered  Monitor patient's weight and dietary intake as ordered or per policy  Utilize nutrition screening tool and intervene as necessary   Determine patient's food preferences and provide high-protein, high-caloric foods as appropriate       INTERVENTIONS:  - Monitor oral intake, urinary output, labs, and treatment plans  - Assess nutrition and hydration status and recommend course of action  - Evaluate amount of meals eaten  - Assist patient with eating if necessary   - Allow adequate time for meals  - Recommend/ encourage appropriate diets, oral nutritional supplements, and vitamin/mineral supplements  - Order, calculate, and assess calorie counts as needed  - Recommend, monitor, and adjust tube feedings and TPN/PPN based on assessed needs  - Assess need for intravenous fluids  - Provide specific nutrition/hydration education as appropriate  - Include patient/family/caregiver in decisions related to nutrition  Outcome: Progressing

## 2022-10-08 NOTE — NURSING NOTE
Patient is cooperative he appears anxious in affect  He has frequent somatic complaints  He is social in milieu

## 2022-10-08 NOTE — NURSING NOTE
Patient is alert and oriented, visible in milieu  Pleasant and cooperative  Compliant with all his medication  Pt is c/o not being able to urinate, bladder scanned for 218 ml    PO fluids offered  No pain reported at this time  Increased tremors noted on this shift r/t anxiety, routine ativan given awaiting effectiveness  Reported  feeling much better at the end of the shift  and was able to empty his bladder  Pt rates anxiety 2/4 and depression 5/10 and denies all other psych s/s  Q 7 min  safety checks maintained

## 2022-10-08 NOTE — PLAN OF CARE
Problem: Ineffective Coping  Goal: Participates in unit activities  Description: Interventions:  - Provide therapeutic environment   - Provide required programming   - Redirect inappropriate behaviors   Outcome: Progressing     Problem: Anxiety  Goal: Anxiety is at manageable level  Description: Interventions:  - Assess and monitor patient's anxiety level  - Monitor for signs and symptoms (heart palpitations, chest pain, shortness of breath, headaches, nausea, feeling jumpy, restlessness, irritable, apprehensive)  - Collaborate with interdisciplinary team and initiate plan and interventions as ordered    - [place] patient to unit/surroundings  - Explain treatment plan  - Encourage participation in care  - Encourage verbalization of concerns/fears  - Identify coping mechanisms  - Assist in developing anxiety-reducing skills  - Administer/offer alternative therapies  - Limit or eliminate stimulants  Outcome: Progressing     Problem: Ineffective Coping  Goal: Demonstrates healthy coping skills  Outcome: Progressing

## 2022-10-09 PROCEDURE — 99232 SBSQ HOSP IP/OBS MODERATE 35: CPT | Performed by: PSYCHIATRY & NEUROLOGY

## 2022-10-09 RX ADMIN — TRAZODONE HYDROCHLORIDE 100 MG: 100 TABLET ORAL at 21:19

## 2022-10-09 RX ADMIN — HYDROXYZINE HYDROCHLORIDE 25 MG: 25 TABLET, FILM COATED ORAL at 06:02

## 2022-10-09 RX ADMIN — HYDROXYZINE HYDROCHLORIDE 25 MG: 25 TABLET, FILM COATED ORAL at 14:27

## 2022-10-09 RX ADMIN — MELATONIN TAB 3 MG 3 MG: 3 TAB at 21:19

## 2022-10-09 RX ADMIN — PANTOPRAZOLE SODIUM 40 MG: 40 TABLET, DELAYED RELEASE ORAL at 09:23

## 2022-10-09 RX ADMIN — CARVEDILOL 12.5 MG: 12.5 TABLET, FILM COATED ORAL at 17:39

## 2022-10-09 RX ADMIN — ARIPIPRAZOLE 10 MG: 10 TABLET ORAL at 09:23

## 2022-10-09 RX ADMIN — SERTRALINE HYDROCHLORIDE 150 MG: 100 TABLET ORAL at 09:23

## 2022-10-09 RX ADMIN — POTASSIUM CHLORIDE 20 MEQ: 1500 TABLET, EXTENDED RELEASE ORAL at 09:23

## 2022-10-09 RX ADMIN — PROPRANOLOL HYDROCHLORIDE 5 MG: 10 TABLET ORAL at 14:26

## 2022-10-09 RX ADMIN — ATORVASTATIN CALCIUM 40 MG: 40 TABLET, FILM COATED ORAL at 17:39

## 2022-10-09 RX ADMIN — WARFARIN SODIUM 4 MG: 2 TABLET ORAL at 17:39

## 2022-10-09 RX ADMIN — CARVEDILOL 12.5 MG: 12.5 TABLET, FILM COATED ORAL at 09:23

## 2022-10-09 RX ADMIN — LORAZEPAM 0.25 MG: 0.5 TABLET ORAL at 21:21

## 2022-10-09 RX ADMIN — FAMOTIDINE 40 MG: 20 TABLET ORAL at 17:40

## 2022-10-09 RX ADMIN — MIRTAZAPINE 15 MG: 15 TABLET, FILM COATED ORAL at 21:19

## 2022-10-09 RX ADMIN — PANTOPRAZOLE SODIUM 40 MG: 40 TABLET, DELAYED RELEASE ORAL at 21:19

## 2022-10-09 RX ADMIN — LORAZEPAM 0.25 MG: 0.5 TABLET ORAL at 09:26

## 2022-10-09 RX ADMIN — FUROSEMIDE 20 MG: 40 TABLET ORAL at 09:23

## 2022-10-09 RX ADMIN — FAMOTIDINE 40 MG: 20 TABLET ORAL at 09:22

## 2022-10-09 NOTE — PLAN OF CARE
Problem: Ineffective Coping  Goal: Identifies ineffective coping skills  Outcome: Progressing  Goal: Identifies healthy coping skills  Outcome: Progressing  Goal: Demonstrates healthy coping skills  Outcome: Progressing  Goal: Patient/Family participate in treatment and DC plans  Description: Interventions:  - Provide therapeutic environment  Outcome: Progressing  Goal: Patient/Family verbalizes awareness of resources  Outcome: Progressing  Goal: Understands least restrictive measures  Description: Interventions:  - Utilize least restrictive behavior  Outcome: Progressing  Goal: Free from restraint events  Description: - Utilize least restrictive measures   - Provide behavioral interventions   - Redirect inappropriate behaviors   Outcome: Progressing     Problem: Anxiety  Goal: Anxiety is at manageable level  Description: Interventions:  - Assess and monitor patient's anxiety level  - Monitor for signs and symptoms (heart palpitations, chest pain, shortness of breath, headaches, nausea, feeling jumpy, restlessness, irritable, apprehensive)  - Collaborate with interdisciplinary team and initiate plan and interventions as ordered  - [place] patient to unit/surroundings  - Explain treatment plan  - Encourage participation in care  - Encourage verbalization of concerns/fears  - Identify coping mechanisms  - Assist in developing anxiety-reducing skills  - Administer/offer alternative therapies  - Limit or eliminate stimulants  Outcome: Progressing     Problem: Nutrition/Hydration-ADULT  Goal: Nutrient/Hydration intake appropriate for improving, restoring or maintaining nutritional needs  Description: Monitor and assess patient's nutrition/hydration status for malnutrition  Collaborate with interdisciplinary team and initiate plan and interventions as ordered  Monitor patient's weight and dietary intake as ordered or per policy  Utilize nutrition screening tool and intervene as necessary   Determine patient's food preferences and provide high-protein, high-caloric foods as appropriate       INTERVENTIONS:  - Monitor oral intake, urinary output, labs, and treatment plans  - Assess nutrition and hydration status and recommend course of action  - Evaluate amount of meals eaten  - Assist patient with eating if necessary   - Allow adequate time for meals  - Recommend/ encourage appropriate diets, oral nutritional supplements, and vitamin/mineral supplements  - Order, calculate, and assess calorie counts as needed  - Recommend, monitor, and adjust tube feedings and TPN/PPN based on assessed needs  - Assess need for intravenous fluids  - Provide specific nutrition/hydration education as appropriate  - Include patient/family/caregiver in decisions related to nutrition  Outcome: Progressing

## 2022-10-09 NOTE — NURSING NOTE
Patient pleasant and cooperative, visible in milieu  Denies pain, denies all psych s/s  Pt appears anxious this shift, routine ativan administered which was effective  Medication compliant  Q 7 min safety checks ongoing

## 2022-10-09 NOTE — PROGRESS NOTES
Progress Note - Poppy [age] y o  male MRN: [id number]   Unit/Bed#: [facility] 681-32 Encounter: [id number]      Subjective:  Patient's chart reviewed and case discussed with the nurse  The patient was seen in the milieu today  The patient has significant pill rolling tremor of his left hand  he though reports his mood has been okay today  Denies feeling depressed  He does seem to be very anxious  The patient reports that he has been having difficulty with urination as it comes with significant delay  As per the nurse though bladder scan done yesterday was normal and bladder was empty  The patient reports he gets very tired sitting on the commode  Denies any SI or HI  Reports slept not good last night  Denied any auditory or visual hallucination  Does not endorse any paranoia or delusional ideation during the meeting  Reports compliant with the medication and denies any side effect  As per nurse the patient has been pleasant and cooperative and visible in the milieu  Denies pain  Though was very anxious last evening and got Ativan which was effective  ROS: all other systems are negative    Mental Status Evaluation:    Appearance:  casually dressed   Behavior:  cooperative   Speech:  normal rate, normal volume, normal pitch   Mood:  Anxious   Affect:  constricted   Thought Process:  negative thinking   Associations: intact associations   Thought Content:  no overt delusions   Perceptual Disturbances: denies auditory or visual hallucinations when asked   Risk Potential: Suicidal ideation - None  Homicidal ideation - None  Potential for aggression - No   Sensorium:  oriented to person, place and time/date   Memory:  recent and remote memory grossly intact   Consciousness:  alert and awake   Attention: poor concentration   Insight:  limited   Judgment: limited   Gait/Station: slow gait   Motor Activity: Tremor of hands noted       Vital signs in last 24 hours:    Temp: [97 5 °F (36 4 °C)-97 7 °F (36 5 °C)] 97 7 °F (36 5 °C)  HR:  [85-89] 85  Resp:  [18] 18  BP: (164-176)/(91-97) 172/94    Laboratory results: I have personally reviewed all pertinent laboratory/tests results  Progress Toward Goals: improving gradually    Assessment/Plan  anxious due to underlying somatic complaint  No SI or HI  No psychotic symptoms endorsed today  The plan is to continue with the current medication with no changes  Avoid Cogentin given the patient complain of urine retention  We will continue to monitor the patient for his mood, behavior, safety, sleep and response to medication  Principal Problem:    SHIMON (generalized anxiety disorder)  Active Problems:    Hypertension    GERD (gastroesophageal reflux disease)    Atrial fibrillation ([facility])    Class 2 obesity in adult    History of stroke    Antiphospholipid antibody with hypercoagulable state ([facility] )    Hyperbilirubinemia    CORIE (obstructive sleep apnea)    PVD (peripheral vascular disease) ([facility])    Medical clearance for incarceration    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 attack    Mild protein-calorie malnutrition ([facility] )    Recommended Treatment:     Planned medication and treatment changes:     All current active medications have been reviewed  Encourage group therapy, milieu therapy and occupational therapy  Behavioral Health checks every 7 minutes  Continue current medications:  Current Facility-Administered Medications   Medication Dose Route Frequency Provider Last Rate   • acetaminophen  650 mg Oral Q4H PRN Linda Hood MD     • acetaminophen  650 mg Oral Q4H PRN Linda Hood MD     • acetaminophen  975 mg Oral Q6H PRN Linda Hood MD     • aluminum-magnesium hydroxide-simethicone  30 mL Oral Q4H PRN Linda Hood MD     • ARIPiprazole  10 mg Oral Daily Usman Fernando DO     • atorvastatin  40 mg Oral Daily With Dorcas Montenegro MD     • benztropine  1 mg Intramuscular Q4H PRN Max 6/day Marleni Davenport MD     • benztropine  0 5 mg Oral Q4H PRN Max 6/day Laura Schwartz MD     • carvedilol  12 5 mg Oral BID With Meals Marleni Davenport MD     • famotidine  40 mg Oral BID Marianne Tompkins PA-C     • furosemide  20 mg Oral Daily Laura Schwartz MD     • haloperidol lactate  5 mg Intramuscular Q4H PRN Max 4/day Marleni Davenport MD     • hydrOXYzine HCL  25 mg Oral Q6H PRN Max 4/day Marleni Davenport MD     • LORazepam  1 mg Intramuscular Q6H PRN Max 3/day Marleni Davenport MD     • LORazepam  0 25 mg Oral BID Jovany Nielsen DO     • melatonin  3 mg Oral HS Marleni Davenport MD     • mirtazapine  15 mg Oral HS Jovany Nielsen DO     • pantoprazole  40 mg Oral BID Marianne Tompkins PA-C     • polyethylene glycol  17 g Oral Daily PRN Marleni Davenport MD     • potassium chloride  20 mEq Oral Daily Laura Schwartz MD     • propranolol  5 mg Oral Q8H PRN Marleni Davenport MD     • risperiDONE  0 25 mg Oral Q4H PRN Max 6/day Laura Schwartz MD     • risperiDONE  0 5 mg Oral Q4H PRN Max 3/day Marleni Davenport MD     • risperiDONE  1 mg Oral Q2H PRN Max 3/day Marleni Davenport MD     • senna-docusate sodium  1 tablet Oral Daily PRN Marleni Davenport MD     • sertraline  150 mg Oral Daily Jovany Nielsen DO     • traZODone  100 mg Oral HS Jovany Nielsen DO     • warfarin  4 mg Oral Daily (warfarin) Zarina Pérez MD         Risks / Benefits of Treatment:    Risks, benefits, and possible side effects of medications explained to patient and patient verbalizes understanding and agreement for treatment  Counseling / Coordination of Care:    Patient's progress discussed with staff in treatment team meeting  Medications, treatment progress and treatment plan reviewed with patient      Tad Cardoso MD 10/09/22

## 2022-10-09 NOTE — PLAN OF CARE
Problem: Ineffective Coping  Goal: Identifies healthy coping skills  Outcome: Progressing     Problem: Ineffective Coping  Goal: Demonstrates healthy coping skills  Outcome: Progressing     Problem: Ineffective Coping  Goal: Participates in unit activities  Description: Interventions:  - Provide therapeutic environment   - Provide required programming   - Redirect inappropriate behaviors   Outcome: Progressing     Problem: Anxiety  Goal: Anxiety is at manageable level  Description: Interventions:  - Assess and monitor patient's anxiety level  - Monitor for signs and symptoms (heart palpitations, chest pain, shortness of breath, headaches, nausea, feeling jumpy, restlessness, irritable, apprehensive)  - Collaborate with interdisciplinary team and initiate plan and interventions as ordered  - [place] patient to unit/surroundings  - Explain treatment plan  - Encourage participation in care  - Encourage verbalization of concerns/fears  - Identify coping mechanisms  - Assist in developing anxiety-reducing skills  - Administer/offer alternative therapies  - Limit or eliminate stimulants  Outcome: Progressing     Problem: Nutrition/Hydration-ADULT  Goal: Nutrient/Hydration intake appropriate for improving, restoring or maintaining nutritional needs  Description: Monitor and assess patient's nutrition/hydration status for malnutrition  Collaborate with interdisciplinary team and initiate plan and interventions as ordered  Monitor patient's weight and dietary intake as ordered or per policy  Utilize nutrition screening tool and intervene as necessary  Determine patient's food preferences and provide high-protein, high-caloric foods as appropriate       INTERVENTIONS:  - Monitor oral intake, urinary output, labs, and treatment plans  - Assess nutrition and hydration status and recommend course of action  - Evaluate amount of meals eaten  - Assist patient with eating if necessary   - Allow adequate time for meals  - Recommend/ encourage appropriate diets, oral nutritional supplements, and vitamin/mineral supplements  - Order, calculate, and assess calorie counts as needed  - Recommend, monitor, and adjust tube feedings and TPN/PPN based on assessed needs  - Assess need for intravenous fluids  - Provide specific nutrition/hydration education as appropriate  - Include patient/family/caregiver in decisions related to nutrition  Outcome: Progressing

## 2022-10-09 NOTE — NURSING NOTE
Patient is cooperative with care  He is social in milieu  He appears very anxious in affect  He requested medication for tremor, restlessness and anxiety  He was given PRN propanolol and PRN hydroxyzine  Both of which he reports were ineffective  He is often preoccupied with different somatic complaints which improve slightly following encouragement

## 2022-10-10 ENCOUNTER — TELEPHONE (OUTPATIENT)
Dept: PSYCHIATRY | Facility: CLINIC | Age: 62
End: 2022-10-10

## 2022-10-10 ENCOUNTER — APPOINTMENT (OUTPATIENT)
Dept: ULTRASOUND IMAGING | Facility: HOSPITAL | Age: 62
DRG: 880 | End: 2022-10-10
Payer: COMMERCIAL

## 2022-10-10 PROBLEM — I49.8 SINUS ARRHYTHMIA: Status: ACTIVE | Noted: 2022-10-10

## 2022-10-10 LAB
ATRIAL RATE: 84 BPM
P AXIS: 41 DEGREES
PR INTERVAL: 150 MS
QRS AXIS: 8 DEGREES
QRSD INTERVAL: 82 MS
QT INTERVAL: 374 MS
QTC INTERVAL: 441 MS
T WAVE AXIS: 1 DEGREES
T4 FREE SERPL-MCNC: 1.4 NG/DL (ref 0.76–1.46)
TSH SERPL DL<=0.05 MIU/L-ACNC: 0.31 UIU/ML (ref 0.45–4.5)
VENTRICULAR RATE: 84 BPM

## 2022-10-10 PROCEDURE — 99222 1ST HOSP IP/OBS MODERATE 55: CPT | Performed by: INTERNAL MEDICINE

## 2022-10-10 PROCEDURE — 93005 ELECTROCARDIOGRAM TRACING: CPT

## 2022-10-10 PROCEDURE — 99232 SBSQ HOSP IP/OBS MODERATE 35: CPT | Performed by: INTERNAL MEDICINE

## 2022-10-10 PROCEDURE — 84439 ASSAY OF FREE THYROXINE: CPT

## 2022-10-10 PROCEDURE — 76536 US EXAM OF HEAD AND NECK: CPT

## 2022-10-10 PROCEDURE — 99232 SBSQ HOSP IP/OBS MODERATE 35: CPT | Performed by: STUDENT IN AN ORGANIZED HEALTH CARE EDUCATION/TRAINING PROGRAM

## 2022-10-10 PROCEDURE — 93010 ELECTROCARDIOGRAM REPORT: CPT | Performed by: INTERNAL MEDICINE

## 2022-10-10 PROCEDURE — 84443 ASSAY THYROID STIM HORMONE: CPT

## 2022-10-10 RX ORDER — HALOPERIDOL DECANOATE 100 MG/ML
150 INJECTION INTRAMUSCULAR
Status: DISCONTINUED | OUTPATIENT
Start: 2022-11-10 | End: 2022-10-10

## 2022-10-10 RX ORDER — SERTRALINE HYDROCHLORIDE 100 MG/1
200 TABLET, FILM COATED ORAL DAILY
Status: DISCONTINUED | OUTPATIENT
Start: 2022-10-11 | End: 2022-10-13

## 2022-10-10 RX ORDER — MIRTAZAPINE 7.5 MG/1
7.5 TABLET, FILM COATED ORAL
Status: DISCONTINUED | OUTPATIENT
Start: 2022-10-10 | End: 2022-10-12

## 2022-10-10 RX ORDER — HALOPERIDOL DECANOATE 100 MG/ML
100 INJECTION INTRAMUSCULAR ONCE
Status: DISCONTINUED | OUTPATIENT
Start: 2022-10-10 | End: 2022-10-10

## 2022-10-10 RX ORDER — CARVEDILOL 12.5 MG/1
25 TABLET ORAL 2 TIMES DAILY WITH MEALS
Status: DISCONTINUED | OUTPATIENT
Start: 2022-10-10 | End: 2022-10-13

## 2022-10-10 RX ORDER — HALOPERIDOL DECANOATE 50 MG/ML
50 INJECTION INTRAMUSCULAR ONCE
Status: DISCONTINUED | OUTPATIENT
Start: 2022-10-10 | End: 2022-10-10

## 2022-10-10 RX ORDER — LORAZEPAM 0.5 MG/1
0.25 TABLET ORAL
Status: DISCONTINUED | OUTPATIENT
Start: 2022-10-10 | End: 2022-10-14

## 2022-10-10 RX ADMIN — SENNOSIDES AND DOCUSATE SODIUM 1 TABLET: 50; 8.6 TABLET ORAL at 12:05

## 2022-10-10 RX ADMIN — CARVEDILOL 25 MG: 12.5 TABLET, FILM COATED ORAL at 16:34

## 2022-10-10 RX ADMIN — PANTOPRAZOLE SODIUM 40 MG: 40 TABLET, DELAYED RELEASE ORAL at 21:05

## 2022-10-10 RX ADMIN — ARIPIPRAZOLE 10 MG: 10 TABLET ORAL at 08:02

## 2022-10-10 RX ADMIN — FAMOTIDINE 40 MG: 20 TABLET ORAL at 17:23

## 2022-10-10 RX ADMIN — TRAZODONE HYDROCHLORIDE 100 MG: 100 TABLET ORAL at 21:05

## 2022-10-10 RX ADMIN — POLYETHYLENE GLYCOL 3350 17 G: 17 POWDER, FOR SOLUTION ORAL at 08:55

## 2022-10-10 RX ADMIN — PANTOPRAZOLE SODIUM 40 MG: 40 TABLET, DELAYED RELEASE ORAL at 08:02

## 2022-10-10 RX ADMIN — FAMOTIDINE 40 MG: 20 TABLET ORAL at 08:55

## 2022-10-10 RX ADMIN — ATORVASTATIN CALCIUM 40 MG: 40 TABLET, FILM COATED ORAL at 16:34

## 2022-10-10 RX ADMIN — HYDROXYZINE HYDROCHLORIDE 25 MG: 25 TABLET, FILM COATED ORAL at 16:34

## 2022-10-10 RX ADMIN — LORAZEPAM 0.25 MG: 0.5 TABLET ORAL at 21:05

## 2022-10-10 RX ADMIN — MIRTAZAPINE 7.5 MG: 7.5 TABLET, FILM COATED ORAL at 21:05

## 2022-10-10 RX ADMIN — WARFARIN SODIUM 4 MG: 2 TABLET ORAL at 17:22

## 2022-10-10 RX ADMIN — POTASSIUM CHLORIDE 20 MEQ: 1500 TABLET, EXTENDED RELEASE ORAL at 08:55

## 2022-10-10 RX ADMIN — MELATONIN TAB 3 MG 3 MG: 3 TAB at 21:05

## 2022-10-10 RX ADMIN — CARVEDILOL 12.5 MG: 12.5 TABLET, FILM COATED ORAL at 08:01

## 2022-10-10 RX ADMIN — FUROSEMIDE 20 MG: 40 TABLET ORAL at 08:02

## 2022-10-10 RX ADMIN — PROPRANOLOL HYDROCHLORIDE 5 MG: 10 TABLET ORAL at 12:26

## 2022-10-10 RX ADMIN — SERTRALINE HYDROCHLORIDE 150 MG: 100 TABLET ORAL at 08:55

## 2022-10-10 NOTE — ASSESSMENT & PLAN NOTE
· IM service was called for hypertension and tachycardia  · Patient stated he had pending attack this morning  · Patient denied typical chest pain, shortness of breath, diaphoresis  · Patient has history of paroxysmal AFib, currently in sinus rhythm  · EKG shows sinus arrhythmia with no acute ST-T changes  · Recommended Cardiology consult  · Cardiology recommended increased carvedilol to 25 mg b i d   Continue compression stocking  · Patient is on warfarin 4 mg daily    INR is 3 03   · Follow echocardiogram  · Recommended endocrinology consult for thyroid management

## 2022-10-10 NOTE — PROGRESS NOTES
Progress Note - 26[address] Neha Moses 58 y o  male MRN: [id number]  Unit/Bed#: [facility] 252-01 Encounter: [id number]    Assessment/Plan   Principal Problem:    SHIMON (generalized anxiety disorder)  Active Problems:    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 attack    Hypertension    GERD (gastroesophageal reflux disease)    Atrial fibrillation (HCC)    Class 2 obesity in adult    History of stroke    Antiphospholipid antibody with hypercoagulable state ([facility] )    Hyperbilirubinemia    CORIE (obstructive sleep apnea)    PVD (peripheral vascular disease) ([facility])    Medical clearance for incarceration    Mild protein-calorie malnutrition ([facility] )      Recommended Treatment:   Consult to cardiology for HTN intermittent urgency   Repeat thyroid TSH and Free T4 and consider consult to endocrinology  Thyroid US - WNL  Reduce mirtazapine to 7 5mg with plan to transition completely to Zoloft  Decrease Ativan to 0 25mg HS for sleep and anxiety  Increase zoloft to 200mg daily for anxiety       Continue with pharmacotherapy, group therapy, milieu therapy and occupational therapy  Continue to assess for adverse medication side effects  Encourage Hans Vasquezbuddy Ruben to participate in nonverbal forms of therapy including journaling and art/music therapy  Continue frequent safety checks and vitals per unit protocol  Continue to engage CM/SW to assist with collateral, disposition planning, and the implementation of an individualized, patient-centered plan of care  Continue medical management by medical team   Case discussed with treatment team     Legal Status: 201  ------------------------------------------------------------    Subjective: All documentation including nursing notes, medication history to ensure medication adherence on the unit, labs, and vitals were reviewed  Rudy Gilmore was evaluated this morning for continuity of care and no acute distress noted throughout the evaluation   Over the past 24 hours per nursing report, Melissa Dalal has been perseverative about urination cooperative on the unit and compliant with medications  Today, Melissa Dalal is consenting for safety on the unit  Melissa Dalal reports feeling "not good " Melissa Dalal notes having decent sleep  Melissa Dalal states having a reduced appetite  eMlissa Dalal has been taking the medications as prescribed and reporting side effects of feeling “woozy”  Patient was seen and examined today  We discussed patient perseveration about plastic urinal in his room and catastrophizing that he will not be able to have access to this and he will be told that he cannot have in his room and that would this writer please make sure that he has a urinal in his room because without this he will not be able to pee  Provided reassurance, discussed with patient and asked him if he has ever had a difficulty with getting access to the urinal   Patient states that he has not had difficulty but perseverates on the fact that at some point he could not have access to it and that “some people do not know where they are, some people do not want you to have it but it is really important that I have the urinal because without I cannot pee”  Patient then states “I should've never told you”  When asked about this statement, patient states that now that "people know" about his urinal they can take it from him “to see what happens”  Asked patient why anybody would do this and patient states “because you can”  Displays some mild paranoia around his urinal situation  Patient reports that he has not had a bowel movement 2 and half days but seems less preoccupied with this overall  Reassured patient that this writer would a discussed with nursing  This seemed to make patient more anxious  Patient appears to be very committed to negative thinking  Melissa Dalal denies suicidal ideations  Melissa Dalal denies homicidal ideations   Regarding hallucinations, Melissa Dalal denies and does not appear to be responding to internal stimulation    Filled Written  Sold  ID  Drug  QTY  Days  Prescriber  RX #  Dispenser  Refill  Daily Dose*  Pymt Type       09/13/2022 09/13/2022   1  Lorazepam 0 5 Mg Tablet   60 00  20  To Hol  [id number]   Pen (4349)   1 50 LME  [place]     08/31/2022 08/31/2022   1  Lorazepam 0 5 Mg Tablet   30 00  10  To Hol  [id number]   Pen (4349)   1 50 LME  [place]     08/16/2022 08/16/2022   1  Lorazepam 0 5 Mg Tablet   30 00  10  To Hol  [id number]   Pen (4349)   1 50 LME  [place]     08/11/2022 08/11/2022   1  Lorazepam 1 Mg Tablet   5 00  3  Ma Ebe  [id number]   Pen (9800)   1 67 LME  [place]          PRNs overnight:  None   VS: Reviewed, Increased blood pressure, otherwise within normal limits    Progress Toward Goals: slow improvement    Psychiatric Review of Systems:  Behavior over the last 24 hours:  improved  Sleep: normal  Appetite: decreased  Medication side effects: Yes - He maintains that he feels “woozy” however he has had steady gait and orthostatics were negative   ROS: all other systems are negative    Vital signs in last 24 hours:  Temp:  [97 7 °F (36 5 °C)-98 °F (36 7 °C)] 97 8 °F (36 6 °C)  HR:  [] 103  Resp:  [16-18] 18  BP: (166-182)/() 182/107    Laboratory results:  I have personally reviewed all pertinent laboratory/tests results  No results found for this or any previous visit (from the past 48 hour(s))        Mental Status Evaluation:    Appearance:  casually dressed, marginal hygiene, looks older than stated age, overweight, Overtly appearing  male, in no apparent acute distress, intermittent eye contact   Behavior:  cooperative   Speech:  normal rate and volume, minimal stuttering   Mood:  "Not good"   Affect:  constricted, Anxious, brighter than before   Thought Process:  circumstantial, perseverative, negative thinking   Associations: circumstantial associations   Thought Content:  mild paranoia, obsessive thoughts, negative thoughts, ruminating thoughts, preoccupied with Plastic urinal access   Perceptual Disturbances: Denies auditory or visual hallucinations and Does not appear to be responding to internal stimuli   Risk Potential: Suicidal ideation - None at present  Homicidal ideation - None at present  Potential for aggression - Not at present   Sensorium:  oriented to person, place and time/date   Memory:  recent and remote memory grossly intact   Consciousness:  alert and awake   Attention/Concentration: attention span and concentration are age appropriate   Insight:  limited   Judgment: limited   Gait/Station: slow gait   Motor Activity: no abnormal movements       Current Medications:  Current Facility-Administered Medications   Medication Dose Route Frequency Provider Last Rate   • acetaminophen  650 mg Oral Q4H PRN Nghia Tobin MD     • acetaminophen  650 mg Oral Q4H PRN Nghia Tobin MD     • acetaminophen  975 mg Oral Q6H PRN Nghia Tobin MD     • aluminum-magnesium hydroxide-simethicone  30 mL Oral Q4H PRN Nghia Tobin MD     • ARIPiprazole  10 mg Oral Daily Michi Cable, DO     • atorvastatin  40 mg Oral Daily With Yolis Bob MD     • benztropine  1 mg Intramuscular Q4H PRN Max 6/day Nghia Tobin MD     • benztropine  0 5 mg Oral Q4H PRN Max 6/day Nghia Tobin MD     • carvedilol  12 5 mg Oral BID With Meals Nghia Tobin MD     • famotidine  40 mg Oral BID Sayra Zepeda PA-C     • furosemide  20 mg Oral Daily Laura Rivera MD     • haloperidol lactate  5 mg Intramuscular Q4H PRN Max 4/day Nghia Tobin MD     • hydrOXYzine HCL  25 mg Oral Q6H PRN Max 4/day Nghia Tobin MD     • LORazepam  1 mg Intramuscular Q6H PRN Max 3/day Nghia Tobin MD     • melatonin  3 mg Oral HS Nghia Tobin MD     • mirtazapine  15 mg Oral HS Michi Cable, DO     • pantoprazole  40 mg Oral BID Sayra Zepeda PA-C     • polyethylene glycol  17 g Oral Daily PRN Nghia Tobin MD     • potassium chloride  20 mEq Oral Daily Laura Curry MD     • propranolol  5 mg Oral Q8H PRN Domitila Dumont MD     • risperiDONE  0 25 mg Oral Q4H PRN Max 6/day Domitila Dumont MD     • risperiDONE  0 5 mg Oral Q4H PRN Max 3/day Domitila Dumont MD     • risperiDONE  1 mg Oral Q2H PRN Max 3/day Domitila Dumont MD     • senna-docusate sodium  1 tablet Oral Daily PRN Domitila Dumont MD     • sertraline  150 mg Oral Daily Adia Smith DO     • traZODone  100 mg Oral HS Adia Smith DO     • warfarin  4 mg Oral Daily (warfarin) Haily Glaser MD         Suicide/Homicide Risk Assessment:  Risk of Harm to Self:   Based on today's assessment, Marilee Chandni presents the following risk of harm to self: minimal    Risk of Harm to Others:  Based on today's assessment, Marilee Tariq presents the following risk of harm to others: minimal    The following interventions are recommended: behavioral checks every 7 minutes, continued hospitalization on locked unit    Behavioral Health Medications: All current active meds have been reviewed  Changes as in plan section above  Risks, benefits and possible side effects of Medications:   Risks, benefits, and possible side effects of medications explained to patient and patient verbalizes understanding  Counseling / Coordination of Care:  Patient's progress discussed with staff in treatment team meeting  Medications, treatment progress and treatment plan reviewed with patient  Adiasergio Srhiro 10/10/22  Psychiatry Resident, PGY-II    This note was completed in part utilizing [employer] Direct Software  Grammatical, translation, syntax errors, random word insertions, spelling mistakes, and incomplete sentences may be an occasional consequence of this system secondary to software limitations with voice recognition, ambient noise, and hardware issues   If you have any questions or concerns about the content, text, or information contained within the body of this dictation, please contact the provider for clarification

## 2022-10-10 NOTE — CONSULTS
Consultation - Cardiology   Anand Fried 58 y o  male MRN: [id number]  Unit/Bed#: [facility] 335-51 Encounter: [id number]    Physician Requesting Consult: Susan Torres MD  Reason for Consult / Principal Problem:      Hypertension  Consulting physician:  Bola Ashton      Assessment/Plan     Assessment:    • General anxiety disorder and panic attacks on multiple psychiatric medications  • Hypertension with orthostatic hypotension  • Probable hyperthyroidism not on treatment  • Paroxysmal atrial fibrillation in sinus rhythm  • Bicuspid aortic valve without significant stenosis by last echocardiogram which was a year and half ago  • Significant replacement of the ascending aorta a aneurysm with a matthew reconstruction in 2014 without replacing bicuspid aortic valve  • Several lacunar strip CVAs  • Peripheral vascular disease  • Hyperlipidemia on atorvastatin with excellent numbers  • Obstructive sleep apnea  • 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  • Class 2 obesity  • GERD  • Loop recorder in place    Plan:    • Increase carvedilol to 25 mg b i d   • Obtain support stockings  • Consider endocrinology consult  • Echocardiogram  • Haptoglobin, LD, reticulocyte count  • Evaluate the cause of patient's elevated bilirubin unless it has already been done  History of Present Illness   HPI: Ashlie Prieto is a 58y o  year old male who presents with general anxiety disorder on multiple psychiatric medications  Asked to see patient for hypertension in orthostatic hypertension  In addition to general anxiety disorder, panic attacks and hypertension patient has multiple medical problems  The most important, he appears to have hyperthyroidism with a low TSH and elevated free T4  Control of his blood pressure with hyperthyroidism may be difficult and refractory  In addition the hyperthyroidism will make him more prone to atrial fibrillation  He has hyper bilirubinemia of unknown etiology    He has a bicuspid aortic valve without significant stenosis  He is status post significant ascending aortic aneurysm replacement with a hemiarch reconstruction in 2014 in which the bicuspid aortic valve was not replaced  Other problems include paroxysmal atrial fibrillation history of lacunar CVAs, peripheral vascular disease, hyperlipidemia on atorvastatin with a total cholesterol of 89, triglycerides 78, HDL 35, and LDL calculated 38  He also has obstructive sleep apnea, 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 anticoagulation, class 2 obesity  Patient has documented orthostatic hypotension in the vital signs and notably states that when he stands up he must wait 10-15 seconds before he takes his 1st step  Patient blames all the psychiatric medications he is on as the reason for his orthostatic symptoms      Patient Active Problem List    Diagnosis Date Noted   • Mild protein-calorie malnutrition ([facility] ) 10/07/2022   • Medical clearance for incarceration 09/30/2022   • 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 attack 09/30/2022   • Atypical chest pain 07/30/2022   • Hypokalemia 07/30/2022   • Dizziness 07/30/2022   • Renal cyst 06/16/2022   • PVD (peripheral vascular disease) ([facility] ) 06/02/2022   • Ataxia 04/19/2022   • CVA (cerebral vascular accident) ([place] [address] ) 04/19/2022   • Right inguinal pain 03/22/2022   • Alkaline phosphatase elevation 03/08/2022   • Low back pain 02/22/2022   • S/P laparoscopic hernia repair 12/22/2021   • Non-recurrent bilateral inguinal hernia without obstruction or gangrene 11/03/2021   • Gastric polyps 09/01/2021   • Gastric AVM 09/01/2021   • Edema of both lower legs 09/01/2021   • Tremor 08/11/2021   • Weakness of both lower extremities 08/11/2021   • Anticoagulated on Coumadin 05/06/2021   • CORIE (obstructive sleep apnea)    • Hyperbilirubinemia 08/10/2020   • Antiphospholipid antibody with hypercoagulable state ([facility] ) 06/03/2020   • History of stroke 03/13/2020   • Other viral warts 06/26/2019   • Physical deconditioning 06/26/2019   • Class 2 obesity in adult 05/02/2018   • Bright red blood per rectum 11/10/2017   • Numbness 08/26/2017   • H/O aortic aneurysm repair 08/26/2017   • Hypertension 08/26/2017   • GERD (gastroesophageal reflux disease) 08/26/2017   • Hyperlipidemia 03/17/2017   • Peyronie disease 12/09/2016   • Vitamin D deficiency 06/15/2016   • Atrial fibrillation (HCC) 11/17/2014   • SHIMON (generalized anxiety disorder) 11/11/2014   • Constipation 09/19/2014   • Irritable bowel syndrome 04/24/2014   • Kidney stones 04/24/2014       Vitals: /82 (BP Location: Left arm)   Pulse (!) 114   Temp 97 8 °F (36 6 °C) (Temporal)   Resp 18   Ht 5' 8" (1 727 m)   Wt 93 5 kg (206 lb 2 1 oz)   SpO2 96%   BMI 31 34 kg/m²   PREVIOUS WEIGHTS:   Wt Readings from Last 5 Encounters:   10/04/22 93 5 kg (206 lb 2 1 oz)   09/28/22 95 7 kg (211 lb)   09/26/22 95 7 kg (211 lb)   09/21/22 97 1 kg (214 lb)   09/21/22 98 2 kg (216 lb 9 6 oz)       Labs/Data:                      Lab Results   Component Value Date    CHOLESTEROL 120 09/30/2022    TRIG 90 09/30/2022    HDL 34 (L) 09/30/2022    LDLCALC 68 09/30/2022    NONHDL 72 02/02/2018    LDLDIRECT 71 03/04/2022    HGBA1C 5 3 05/02/2021       Results from last 7 days   Lab Units 10/07/22  1539 10/05/22  0554   INR  3 03* 2 36*   PROTIME seconds 31 9* 26 3*          Current Facility-Administered Medications:   •  acetaminophen (TYLENOL) tablet 650 mg, 650 mg, Oral, Q4H PRN, Evelin Perkins MD, 650 mg at 10/03/22 2150  •  acetaminophen (TYLENOL) tablet 650 mg, 650 mg, Oral, Q4H PRN, Evelin Perkins MD, 650 mg at 09/30/22 2256  •  acetaminophen (TYLENOL) tablet 975 mg, 975 mg, Oral, Q6H PRN, Evelin Perkins MD  •  aluminum-magnesium hydroxide-simethicone (MYLANTA) oral suspension 30 mL, 30 mL, Oral, Q4H PRN, Evelin Perkins MD  •  ARIPiprazole (ABILIFY) tablet 10 mg, 10 mg, Oral, Daily, Mendel Grates, DO, 10 mg at 10/10/22 0802  • atorvastatin (LIPITOR) tablet 40 mg, 40 mg, Oral, Daily With Avani Sepulveda MD, 40 mg at 10/09/22 1739  •  benztropine (COGENTIN) injection 1 mg, 1 mg, Intramuscular, Q4H PRN Max 6/day, Laura Schwartz MD  •  benztropine (COGENTIN) tablet 0 5 mg, 0 5 mg, Oral, Q4H PRN Max 6/day, Laura Schwartz MD  •  carvedilol (COREG) tablet 25 mg, 25 mg, Oral, BID With Meals, Rui Beltrán MD  •  famotidine (PEPCID) tablet 40 mg, 40 mg, Oral, BID, Yvette Heck PA-C, 40 mg at 10/10/22 0577  •  furosemide (LASIX) tablet 20 mg, 20 mg, Oral, Daily, Marleni Davenport MD, 20 mg at 10/10/22 0802  •  haloperidol lactate (HALDOL) injection 5 mg, 5 mg, Intramuscular, Q4H PRN Max 4/day, Laura Schwartz MD  •  hydrOXYzine HCL (ATARAX) tablet 25 mg, 25 mg, Oral, Q6H PRN Max 4/day, Laura Schwartz MD, 25 mg at 10/09/22 1427  •  LORazepam (ATIVAN) injection 1 mg, 1 mg, Intramuscular, Q6H PRN Max 3/day, Marleni Davenport MD  •  LORazepam (ATIVAN) tablet 0 25 mg, 0 25 mg, Oral, HS, Jovany Nilesen DO  •  melatonin tablet 3 mg, 3 mg, Oral, HS, Marleni Davenport MD, 3 mg at 10/09/22 2119  •  mirtazapine (REMERON) tablet 7 5 mg, 7 5 mg, Oral, HS, Jovany Nielsen DO  •  pantoprazole (PROTONIX) EC tablet 40 mg, 40 mg, Oral, BID, Yvette Heck PA-C, 40 mg at 10/10/22 0802  •  polyethylene glycol (MIRALAX) packet 17 g, 17 g, Oral, Daily PRN, Marleni Davenport MD, 17 g at 10/10/22 0855  •  potassium chloride (K-DUR,KLOR-CON) CR tablet 20 mEq, 20 mEq, Oral, Daily, Marleni Davenport MD, 20 mEq at 10/10/22 0855  •  propranolol (INDERAL) tablet 5 mg, 5 mg, Oral, Q8H PRN, Malreni Davenport MD, 5 mg at 10/10/22 1226  •  risperiDONE (RisperDAL) tablet 0 25 mg, 0 25 mg, Oral, Q4H PRN Max 6/day, Marleni Davenport MD  •  risperiDONE (RisperDAL) tablet 0 5 mg, 0 5 mg, Oral, Q4H PRN Max 3/day, Marleni Davenport MD  •  risperiDONE (RisperDAL) tablet 1 mg, 1 mg, Oral, Q2H PRN Max 3/day, Marleni Davenport MD  •  senna-docusate sodium (SENOKOT S) 8 6-50 mg per tablet 1 tablet, 1 tablet, Oral, Daily PRN, Darlene Bailey MD, 1 tablet at 10/10/22 1205  •  [START ON 10/11/2022] sertraline (ZOLOFT) tablet 200 mg, 200 mg, Oral, Daily, Pedro De Los Santos DO  •  traZODone (DESYREL) tablet 100 mg, 100 mg, Oral, HS, Pedro De Los Santos DO, 100 mg at 10/09/22 2119  •  warfarin (COUMADIN) tablet 4 mg, 4 mg, Oral, Daily (warfarin), Rell Jones MD, 4 mg at 10/09/22 1739  Allergies   Allergen Reactions   • Losartan Angioedema   • Aspirin Fatigue     Due to blood thinners     • Bactrim [Sulfamethoxazole-Trimethoprim]    • Eliquis [Apixaban] Other (See Comments)     Failed  Had embolic CVA   • Lisinopril      Felt bad, was OK with Zestril brand name     • Tramadol        Historical Information   Past Medical History:   Diagnosis Date   • Aneurysm of thoracic aorta ([facility] )     last assessed 11/20/17   • Anxiety     currently on no meds   • Arthritis    • Bilateral inguinal hernia    • Cardiac disease    • Cognitive impairment    • CVA (cerebral vascular accident) ([place] [address] )    • Depression    • GERD (gastroesophageal reflux disease)    • Hearing loss of aging    • Heart disease    • Hyperlipidemia    • Hypertension    • Irritable bowel syndrome    • Kidney stone    • Obesity    • Occasional tremors     left arm since stroke   • Palpitations    • Panic attack    • PONV (postoperative nausea and vomiting)     and headache x 3 days   • Psychiatric illness    • Sciatica     right and left  side   • Shortness of breath     on exertion   • Sleep apnea     is not using a CPAP   • Sleep difficulties    • Spitting up blood 05/21/2021    Resolved   • Status post placement of implantable loop recorder    • Stroke ([facility] ) 11/2014   • Stroke ([place] [address] ) 03/2021   • TIA (transient ischemic attack)      Past Surgical History:   Procedure Laterality Date   • AORTA SURGERY      thoracic - aneurysmorrhaphy   • APPENDECTOMY     • ASCENDING AORTIC ANEURYSM REPAIR      resolved 8/19/15   • CARDIAC LOOP RECORDER     • CHOLECYSTECTOMY      laparoscopic   • COLONOSCOPY     • CYSTOSCOPY      with removal of object- stent removal   • KNEE ARTHROSCOPY Right 1996    with medial meniscus repair   • LITHOTRIPSY      renal   • ORTHOPEDIC SURGERY     • DC FRAGMENT KIDNEY STONE/ ESWL Left 5/17/2018    Procedure: LITHROTRIPSY EXTRACORPORAL SHOCKWAVE (ESWL); Surgeon: Karen Norton MD;  Location: [facility];  Service: Urology   • [address] 12/9/2021    Procedure: ROBOTIC REPAIR HERNIA INGUINAL;  Surgeon: Valente Solis MD;  Location: [facility];  Service: General   • THUMB ARTHROSCOPY Right 1976    ligament repair   • UPPER GASTROINTESTINAL ENDOSCOPY       Past Surgical History:   Procedure Laterality Date   • AORTA SURGERY      thoracic - aneurysmorrhaphy   • APPENDECTOMY     • ASCENDING AORTIC ANEURYSM REPAIR      resolved 8/19/15   • CARDIAC LOOP RECORDER     • CHOLECYSTECTOMY      laparoscopic   • COLONOSCOPY     • CYSTOSCOPY      with removal of object- stent removal   • KNEE ARTHROSCOPY Right 1996    with medial meniscus repair   • LITHOTRIPSY      renal   • ORTHOPEDIC SURGERY     • DC FRAGMENT KIDNEY STONE/ ESWL Left 5/17/2018    Procedure: LITHROTRIPSY EXTRACORPORAL SHOCKWAVE (ESWL);   Surgeon: Karen Norton MD;  Location: [facility];  Service: Urology   • [address] 12/9/2021    Procedure: ROBOTIC REPAIR HERNIA INGUINAL;  Surgeon: Valente Solis MD;  [facility];  Service: General   • THUMB ARTHROSCOPY Right 1976    ligament repair   • UPPER GASTROINTESTINAL ENDOSCOPY       Social History:  Social History     Substance and Sexual Activity   Alcohol Use Not Currently    Comment: very rare in past     Social History     Substance and Sexual Activity   Drug Use Never     Social History     Tobacco Use   Smoking Status Never Smoker   Smokeless Tobacco Never Used   Tobacco Comment    no second hand smoke exposure     Social History Socioeconomic History   • Marital status:      Spouse name: Not on file   • Number of children: Not on file   • Years of education: Not on file   • Highest education level: Not on file   Occupational History   • Occupation: disabled     Tobacco Use   • Smoking status: Never Smoker   • Smokeless tobacco: Never Used   • Tobacco comment: no second hand smoke exposure   Vaping Use   • Vaping Use: Never used   Substance and Sexual Activity   • Alcohol use: Not Currently     Comment: very rare in past   • Drug use: Never   • Sexual activity: Not Currently   Other Topics Concern   • Not on file   Social History Narrative    Caffeine use    Completed some college     as per [facility]     Social Determinants of Health     Financial Resource Strain: Not on file   Food Insecurity: Not on file   Transportation Needs: Not on file   Physical Activity: Not on file   Stress: Not on file   Social Connections: Not on file   Intimate Partner Violence: Not on file   Housing Stability: Not on file      Family History:   family history includes Breast cancer in his paternal grandmother; Cancer in his family; Coronary artery disease in his family; Diabetes in his family; Diverticulitis in his mother; Emphysema in his father; Heart attack (age of onset: 72) in his maternal grandfather; Hernia in his family; Hypertension in his family; Lung cancer in his paternal grandfather; Nephrolithiasis in his mother and sister  Intake/Output Summary (Last 24 hours) at 10/10/2022 1453  Last data filed at 10/10/2022 1324  Gross per 24 hour   Intake 680 ml   Output --   Net 680 ml       Invasive Devices  Report    None                 Review of Systems   Constitutional: Negative  HENT: Negative  Eyes: Negative  Respiratory: Negative for cough, chest tightness, shortness of breath, wheezing and stridor  Cardiovascular: Negative for chest pain, palpitations and leg swelling  Gastrointestinal: Negative      Endocrine: Negative  Musculoskeletal: Positive for gait problem  Skin: Negative  Allergic/Immunologic: Negative  Neurological: Positive for tremors and light-headedness  Hematological: Negative  Psychiatric/Behavioral: The patient is nervous/anxious  Physical Exam  Vitals reviewed  Constitutional:       General: He is not in acute distress  Appearance: He is well-developed  HENT:      Head: Normocephalic and atraumatic  Neck:      Thyroid: No thyromegaly  Vascular: No carotid bruit or JVD  Trachea: No tracheal deviation  Cardiovascular:      Rate and Rhythm: Normal rate and regular rhythm  Pulses: Normal pulses  Heart sounds: Murmur heard  Crescendo decrescendo systolic murmur is present with a grade of 1/6  No friction rub  No gallop  Pulmonary:      Effort: Pulmonary effort is normal  No respiratory distress  Breath sounds: Normal breath sounds  No wheezing, rhonchi or rales  Chest:      Chest wall: No tenderness  Abdominal:      General: Bowel sounds are normal  There is no distension  Palpations: Abdomen is soft  Tenderness: There is no abdominal tenderness  Musculoskeletal:      Cervical back: Normal range of motion and neck supple  Right lower leg: No edema  Left lower leg: No edema  Skin:     General: Skin is warm and dry  Neurological:      General: No focal deficit present  Mental Status: He is alert and oriented to person, place, and time  Gait: Gait normal    Psychiatric:         Mood and Affect: Mood normal          Behavior: Behavior normal          Thought Content: Thought content normal          Judgment: Judgment normal            ----------------------------------------------------------------------------------------------  NUCLEAR STRESS TEST: No results found for this or any previous visit      Results for orders placed during the hospital encounter of 02/15/19    NM myocardial perfusion spect (rx stress and/or rest)    Narrative  [address] [place] [address]  [place], [address]  [phone]    Stress Gated SPECT Myocardial Perfusion Imaging after Regadenoson    Patient: Jorge Stephens  MR number: [id number]  Account number: [de-identified]  : 1960  Age: 61 years  Gender: Male  Status: Outpatient  Location: Stress lab  Height: 68 in  Weight: 260 lb  BP: 202/ 101 mmHg    Allergies: SULFAMETHOXAZOLE-TRIMETHOPRIM, LISINOPRIL, TRAMADOL    Diagnosis: R06 00 - Dyspnea, unspecified, R07 9 - Chest pain, unspecified    Primary Physician: Lester Parker MD  Technician:  Silviano Muñoz  Referring Physician:  Danielle Flower DO  Group:  [facility]  Report Prepared By[de-identified]  Skylar Schirmer  Interpreting Physician:  Stefania Gibbons MD    INDICATIONS: Coronary artery disease  HISTORY: The patient is a 61year old  female  Chest pain status: chest pain  Other symptoms: dyspnea  Coronary artery disease risk factors: hypertension  Cardiovascular history: AAA repair, stroke  Medications: a beta blocker, an  ACE inhibitor/ARB, aspirin, and a lipid lowering agent  PHYSICAL EXAM: Baseline physical exam screening: no wheezes audible  REST ECG: Normal sinus rhythm  Nonspecific ST-T wave changes  PROCEDURE: The study was performed in the the Stress lab  A regadenoson infusion pharmacologic stress test was performed  Gated SPECT myocardial perfusion imaging was performed during stress  Systolic blood pressure was 202 mmHg, at the  start of the study  Diastolic blood pressure was 101 mmHg, at the start of the study  The heart rate was 61 bpm, at the start of the study  IV double checked  Regadenoson protocol:  HR bpm SBP mmHg DBP mmHg Symptoms  Baseline 61 202 101 none  Immediate 129 206 112 dyspnea, dizziness, nausea  2 min 88 196 100 subsiding  4 min 81 184 100 none  No medications or fluids given   The patient also performed low level exercise  STRESS SUMMARY: Duration of pharmacologic stress was 3 min  There was no chest pain during stress  The stress test was terminated due to protocol completion  Pre oxygen saturation: 99 %  Peak oxygen saturation: 99 %  The stress ECG was  negative for ischemia and normal  There were no stress arrhythmias or conduction abnormalities  ISOTOPE ADMINISTRATION:  Resting isotope administration Stress isotope administration  Agent Tetrofosmin Tetrofosmin  Dose 15 4 mCi 49 4 mCi    The radiopharmaceutical was injected at the peak effect of pharmacologic stress  MYOCARDIAL PERFUSION IMAGING:  The image quality was good  Left ventricular size was normal  The TID ratio was 0 94  PERFUSION DEFECTS:  -  There were no perfusion defects  GATED SPECT:  The calculated left ventricular ejection fraction was 52 %  Left ventricular ejection fraction was within normal limits by visual estimate  There was no left ventricular regional abnormality  SUMMARY:  -  Stress results: There was no chest pain during stress  -  ECG conclusions: The stress ECG was negative for ischemia and normal   -  Perfusion imaging: There were no perfusion defects   -  Gated SPECT: The calculated left ventricular ejection fraction was 52 %  Left ventricular ejection fraction was within normal limits by visual estimate  There was no left ventricular regional abnormality  IMPRESSIONS: Normal study after vasodilation and low level exercise  Myocardial perfusion imaging was normal at rest and with stress      Prepared and signed by    Mario Meehan MD  Signed 02/15/2019 15:39:39    --------------------------------------------------------------------------------  ECHO:   Results for orders placed during the hospital encounter of 03/12/20    Echo complete with contrast if indicated    [address]en [address]  [facility] [place], [address] [facility]  [phone]    Transthoracic Echocardiogram  2D, M-mode, Doppler, and Color Doppler    Study date:  13-Mar-2020    Patient: Lisa Roldan  MR number: [id number]  Account number: [de-identified]  : 1960  Age: 61 years  Gender: Male  Status: Inpatient  Location: Bedside  Height: 68 in  Weight: 247 5 lb  BP: 142/ 92 mmHg    Indications: TIA  Diagnoses: G45 9 - Transient cerebral ischemic attack, unspecified    Sonographer:  Milagros Hernandez  Primary Physician: Peter Mohamud MD  Referring Physician:  Kwadwo Iyer MD  Group:  [facility]  Cardiology Fellow:  Rk Recinos MD  Interpreting Physician:  Ana Figueroa MD    SUMMARY    PROCEDURE INFORMATION:  This was a technically difficult study  LEFT VENTRICLE:  Systolic function was normal  Ejection fraction was estimated to be 60 %  There were no regional wall motion abnormalities  Wall thickness was mildly increased  The changes were consistent with concentric remodeling (increased wall thickness with normal wall mass)  Doppler parameters were consistent with abnormal left ventricular relaxation (grade 1 diastolic dysfunction)  VENTRICULAR SEPTUM:  There was mild dyssynergic motion  These changes are consistent with a post-thoracotomy state  RIGHT VENTRICLE:  The ventricle was at least mildly dilated  Wall thickness was increased  Not well visualized  TRICUSPID VALVE:  There was trace regurgitation  HISTORY: PRIOR HISTORY: HTN;A-fib;Microangiopathy;TIA; Aortic aneurysm repair;HLD;GERD; Bicuspid AV with mild AS  PROCEDURE: The procedure was performed at the bedside  This was a routine study  The transthoracic approach was used  The study included complete 2D imaging, M-mode, complete spectral Doppler, and color Doppler  The heart rate was 70 bpm,  at the start of the study  Echocardiographic views were limited due to poor acoustic window availability and decreased penetration  This was a technically difficult study      LEFT VENTRICLE: Size was normal  Systolic function was normal  Ejection fraction was estimated to be 60 %  There were no regional wall motion abnormalities  Wall thickness was mildly increased  The changes were consistent with concentric  remodeling (increased wall thickness with normal wall mass)  DOPPLER: Doppler parameters were consistent with abnormal left ventricular relaxation (grade 1 diastolic dysfunction)  VENTRICULAR SEPTUM: There was mild dyssynergic motion  These changes are consistent with a post-thoracotomy state  RIGHT VENTRICLE: The ventricle was at least mildly dilated  Systolic function was normal  Wall thickness was increased  Not well visualized  LEFT ATRIUM: Not well visualized  RIGHT ATRIUM: Not well visualized  MITRAL VALVE: Valve structure was normal  There was normal leaflet separation  DOPPLER: The transmitral velocity was within the normal range  There was no evidence for stenosis  There was no significant regurgitation  AORTIC VALVE: The valve was probably trileaflet  Leaflets exhibited sclerosis  The valve was not well visualized  DOPPLER: Transaortic velocity was minimally increased  There was no evidence for stenosis  There was no significant  regurgitation  TRICUSPID VALVE: The valve structure was normal  There was normal leaflet separation  DOPPLER: The transtricuspid velocity was within the normal range  There was no evidence for stenosis  There was trace regurgitation  Estimated peak PA  pressure was 35 mmHg  PULMONIC VALVE: Not well visualized  DOPPLER: The transpulmonic velocity was within the normal range  There was trace regurgitation  PERICARDIUM: There was no pericardial effusion  A pericardial fat pad was present  The pericardium was normal in appearance  AORTA: The root exhibited normal size  SYSTEMIC VEINS: IVC: The inferior vena cava was not well visualized      SYSTEM MEASUREMENT TABLES    2D  %FS: 28 46 %  Ao Diam: 3 24 cm  EDV(Teich): 81 53 ml  EF(Teich): 55 23 %  ESV(Teich): 36 5 ml  IVSd: 1 68 cm  LA Area: 14 03 cm2  LA Diam: 4 07 cm  LVEDV MOD A4C: 148 34 ml  LVEF MOD A4C: 51 71 %  LVESV MOD A4C: 71 63 ml  LVIDd: 4 27 cm  LVIDs: 3 05 cm  LVLd A4C: 9 36 cm  LVLs A4C: 7 45 cm  LVOT Diam: 1 83 cm  LVPWd: 1 71 cm  RA Area: 12 84 cm2  RVIDd: 2 42 cm  SV MOD A4C: 76 71 ml  SV(Teich): 45 03 ml    CW  AV Env  Ti: 272 2 ms  AV VTI: 42 59 cm  AV Vmax: 1 9 m/s  AV Vmean: 1 56 m/s  AV maxP 49 mmHg  AV meanPG: 10 37 mmHg  TR Vmax: 3 21 m/s  TR maxP 29 mmHg    MM  TAPSE: 1 09 cm    PW  KASI (VTI): 1 34 cm2  KASI Vmax: 1 17 cm2  E': 0 05 m/s  E/E': 10 53  LVOT Env  Ti: 346 02 ms  LVOT VTI: 21 77 cm  LVOT Vmax: 0 85 m/s  LVOT Vmean: 0 63 m/s  LVOT maxP 89 mmHg  LVOT meanP 73 mmHg  LVSI Dopp: 25 43 ml/m2  LVSV Dopp: 56 95 ml  MV A Qasim: 0 72 m/s  MV Dec [place]: 1 69 m/s2  MV DecT: 303 35 ms  MV E Qasim: 0 51 m/s  MV E/A Ratio: 0 71  MV PHT: 87 97 ms  MVA By PHT: 2 5 cm2    [facility]    Prepared and electronically signed by    Julianne Nichols MD  Signed 13-Mar-2020 15:01:52    Results for orders placed during the hospital encounter of 21    [id number]    Narrative  [address]   [facility], [address] [place] [address]  [phone]    Transesophageal Echocardiogram  2D, 3D, M-mode, Doppler, and Color Doppler    Study date:  03-May-2021    Patient: Mami Culver  MR number: [id number]  Account number: [de-identified]  : 1960  Age: 64 years  Gender: Male  Status: Inpatient  Location: Echo lab  Height: 68 in  Weight: 260 lb  BP: 146/ 82 mmHg    Indications: Transient ischemic attack; Aortic aneurysm    Diagnoses: G45 9 - Transient cerebral ischemic attack, unspecified    Sonographer:  Jessica Najera  Interpreting Physician:  Richa Sanabria MD  Primary Physician:   Winnie Arambula MD  Referring Physician:  Marianna Chavez MD  Group:  [facility]  Cardiology Fellow:  Andres Tamez, MD  RN:  Carmelo Mendoza RN    SUMMARY    LEFT VENTRICLE:  Systolic function was normal  Ejection fraction was estimated to be 60 %  Although no diagnostic regional wall motion abnormality was identified, this possibility cannot be completely excluded on the basis of this study  Wall thickness was mildly increased  There was mild concentric hypertrophy  LEFT ATRIAL APPENDAGE:  No thrombus was identified  ATRIAL SEPTUM:  No defect or patent foramen ovale was identified  MITRAL VALVE:  There was mild regurgitation  AORTIC VALVE:  The valve was bicuspid with right and left coronary cusp fusion  Leaflets exhibited moderately increased thickness  Transaortic velocity was increased due to valvular stenosis  There was mild stenosis  Valve mean gradient was 13 mmHg  Estimated aortic valve area (by Vmax) was 1 7 cmï¾²  TRICUSPID VALVE:  There was mild regurgitation  HISTORY: PRIOR HISTORY: Aortic aneurysm repair; Hyperlipidemia; Hypertension; GERD; Atrial fibrillation; CVA; CORIE    PROCEDURE: The procedure was performed in the echo lab  This was a routine study  The risks and alternatives of the procedure were explained to the patient and informed consent was obtained  The transesophageal approach was used  The study  included complete 2D imaging, 3D imaging, M-mode, complete spectral Doppler, and color Doppler  The heart rate was 79 bpm, at the start of the study  An adult omniplane probe was inserted by the cardiology fellow under direct supervision  of the attending cardiologist  Intubated with ease  One intubation attempt(s)  There was no blood detected on the probe  Intravenous contrast ( 20cc agitated saline) was administered to evaluate intracardiac shunting  Image quality was  adequate  There were no complications during the procedure   MEDICATIONS: Anesthesia administered by anesthesia team     LEFT VENTRICLE: Size was normal  Systolic function was normal  Ejection fraction was estimated to be 60 %  Although no diagnostic regional wall motion abnormality was identified, this possibility cannot be completely excluded on the basis  of this study  Wall thickness was mildly increased  There was mild concentric hypertrophy  DOPPLER: Left ventricular diastolic function parameters were normal     RIGHT VENTRICLE: The size was normal  Systolic function was normal  Wall thickness was normal     LEFT ATRIUM: Size was normal  No thrombus was identified  APPENDAGE: The size was normal  No thrombus was identified  DOPPLER: The function was normal (normal emptying velocity)  ATRIAL SEPTUM: No defect or patent foramen ovale was identified  RIGHT ATRIUM: Size was normal  No thrombus was identified  MITRAL VALVE: Valve structure was normal  There was normal leaflet separation  DOPPLER: There was mild regurgitation  AORTIC VALVE: The valve was bicuspid with right and left coronary cusp fusion  Leaflets exhibited moderately increased thickness  DOPPLER: Transaortic velocity was increased due to valvular stenosis  There was mild stenosis  There was no  significant regurgitation  TRICUSPID VALVE: The valve structure was normal  There was normal leaflet separation  DOPPLER: There was mild regurgitation  PULMONIC VALVE: DOPPLER: There was no significant regurgitation  PERICARDIUM: There was no pericardial effusion  The pericardium was normal in appearance  AORTA: The root exhibited normal size  There was no atheroma  There was no evidence for dissection  There was no evidence for aneurysm      MEASUREMENT TABLES    2D MEASUREMENTS  LVOT   (Reference normals)  Diam   23 mm   (--)  Aortic valve   (Reference normals)  Area sys, plan   1 8 cmï¾²   (--)    DOPPLER MEASUREMENTS  LVOT   (Reference normals)  Peak sonia   99 cm/s   (--)  Mean sonia   60 cm/s   (--)  VTI   21 cm   (--)  Peak gradient   4 mmHg   (--)  Mean gradient   1 4 mmHg   (--)  Stroke vol   87 25 ml   (--)  Aortic valve   (Reference normals)  Peak sonia   243 cm/s   (--)  Mean sonia   178 cm/s   (--)  VTI   52 cm   (--)  Peak gradient   23 62 mmHg   (--)  Mean gradient   13 mmHg   (--)  Obstr index, VTI   0 4    (--)  Valve area, VTI   1 66 cmï¾²   (--)  Area index, VTI   0 72 cmï¾²/mï¾²   (--)  Obstr index, Vmax   0 41    (--)  Valve area, Vmax   1 7 cmï¾²   (--)  Area index, Vmax   0 74 cmï¾²/mï¾²   (--)  Obstr index, Vmean   0 34    (--)  Valve area, Vmean   1 41 cmï¾²   (--)  Area index, Vmean   0 62 cmï¾²/mï¾²   (--)    [facility] Accredited Echocardiography Laboratory    Prepared and electronically signed by    Mark Kevin MD  Signed [id number] 15:49:23    --------------------------------------------------------------------------------  HOLTER  Results for orders placed during the hospital encounter of 10/30/17    Holter monitor - 24 hour    Narrative  INDICATIONS: afib    DESCRIPTION OF FINDINGS:  The patient was monitored for a total of 24 hours  The patient was predominantly noted to be in NSR throughout the study  The average heart rate was 64 beats per minute  The heart rate ranged from a low of 47 beats per minute in AM at 3:12 to a maximum of 108 beats per minute in PM at 6:35  Ventricular ectopic activity consisted of 46 (0 0% of total beats)  There was no sustained or nonsustained ventricular tachycardia  Supraventricular ectopic activity consisted of 39 (0 0% of total beats)  There was no supraventricular tachycardia identified  There was no evidence of atrial fibrillation or atrial flutter  There were no significant pauses  The longest R-R interval was 1 5 seconds  There was no evidence of advanced degree heart block  The accompanying patient diary notes symptoms of palpitations and chest tightness  Correlation with the tracings at these times reveals NSR  Impression  1  Predominantly NSR at varying rates  2    Low density of PVC's  3    Low density of PAC's      4    No significant bradycardia  5    Symptoms as noted correlated with NSR  No results found for this or any previous visit     --------------------------------------------------------------------------------  CAROTIDS  Results for orders placed during the hospital encounter of 08/26/17    VAS carotid complete study    Narrative  [facility] REPORT  CLINICAL:  Indications:  Patient admitted to hospital with left face, arm, and leg  numbness  Symptoms are intermittent X 5 days  Possible TIA  PT has history of  AAA repair  Physician wants to rule out carotid artery stenosis  Operative History  AAA repair  Appendectomy  Right Knee surgery  Risk Factors  The patient has history of HTN, GERD, Stroke, and CAD  The patient's current  BMI is 34 97, Weight (lb) is 230 lb and Height (in) is 68 in  He is a  non-smoker  Clinical  Right Brachial:  IV site  Left Brachial Pressure:  151/99 mmHg  FINDINGS:    Segment      Rig              Left  PSV  EDV (cm/s)  PSV  EDV (cm/s)  Dist  ICA     70          24   87          32  Mid  ICA      77          27   74          27  Prox  ICA     56          15   57          19  Dist CCA      74          21   80          21  Mid CCA       93          22   75          18  Prox CCA     103          18  111          20  Ext Carotid   91          14   71          10  Prox Vert     52          16   37          10  Subclavian    63           5   72           7        CONCLUSION:    Impression    RIGHT:  There is no evidence of significant stenosis noted  Vertebral artery flow is antegrade  There is no significant subclavian artery  disease  LEFT:  There is no evidence of significant stenosis noted  Vertebral artery flow is antegrade  There is no significant subclavian artery  disease  In comparison to the study of 11/03/2014, there is no significant B/L interval  change in the disease process      Internal carotid artery stenosis determination by consensus criteria from:  Jose A Dixon et al  Carotid Artery Stenosis: Gray-Scale and Doppler US Diagnosis  - Society of Radiologists in [address] [place] [address] Drive, Radiology 2003;  [phone]  SIGNATURE:  Electronically Signed by: Beltran Jeff MD on 2017-08-28 04:53:26 PM     ======================================================     Imaging:   I have personally reviewed pertinent reports  EKG: Normal sinus rhythm with sinus arrhythmia  Normal ECG  When compared with ECG of 29-SEP-2022 18:30,  Criteria for an inferior wall myocardial infarction are no longer present    I spent 80 minutes on the patient's hospital visit  This time was spent on the day of the visit  I had direct contact with the patient in the hospital on the day of the visit  Greater than 50% of the total time was spent obtaining a history, examining patient, reviewing chart, discussions with staff, arranging and discussing plan of care with patient and staff as well as directly providing instructions  Additional time then spent on orders and hospital chart  Portions of the record may have been created with voice recognition software  Occasional wrong word or "sound a like" substitutions may have occurred due to the inherent limitations of voice recognition software  Read the chart carefully and recognize, using context, where substitutions have occurred      Denisha Espino

## 2022-10-10 NOTE — PROGRESS NOTES
[facility]  Progress Note César Farias 1960, 58 y o  male MRN: [id number]  Unit/Bed#: Rashida Regan 617-11 Encounter: [id number]  Primary Care Provider: Jhonny Lentz MD   Date and time admitted to hospital: 9/28/2022  6:28 PM    Sinus arrhythmia  Assessment & Plan  · IM service was called for hypertension and tachycardia  · Patient stated he had pending attack this morning  · Patient denied typical chest pain, shortness of breath, diaphoresis  · Patient has history of paroxysmal AFib, currently in sinus rhythm  · EKG shows sinus arrhythmia with no acute ST-T changes  · Recommended Cardiology consult  · Cardiology recommended increased carvedilol to 25 mg b i d   Continue compression stocking  · Patient is on warfarin 4 mg daily  INR is 3 03   · Follow echocardiogram  · Recommended endocrinology consult for thyroid management    Occasional tremors  Assessment & Plan  · Patient stated he has occasional hand tremors could be contributed by thyroid issue  · Thyroid ultrasound was normal  · Recommended endocrinology consult    VTE Pharmacologic Prophylaxis:   Pharmacologic: Warfarin (Coumadin)  Mechanical VTE Prophylaxis in Place: Yes    Patient Centered Rounds: I have performed bedside rounds with nursing staff today  Discussions with Specialists or Other Care Team Provider:  Discussed with nurse    Education and Discussions with Family / Patient:  Discussed with patient    Time Spent for Care: 30 minutes  More than 50% of total time spent on counseling and coordination of care as described above  Current Length of Stay: 11 day(s)    Current Patient Status: Inpatient Psych         Code Status: Level 1 - Full Code      Subjective:   Patient was seen and examined at bedside  The patient denies any headache, blurry vision, chest pain, palpitation, shortness of breath, N/V, abd pain  Patient occasionally get hand tremors  Currently stable        Objective:     Vitals:   Temp (24hrs), Av 8 °F (36 6 °C), Min:97 5 °F (36 4 °C), Max:98 °F (36 7 °C)    Temp:  [97 5 °F (36 4 °C)-98 °F (36 7 °C)] 97 5 °F (36 4 °C)  HR:  [] 86  Resp:  [17-18] 18  BP: (157-193)/() 179/94  SpO2:  [93 %-98 %] 98 %  Body mass index is 31 34 kg/m²  Input and Output Summary (last 24 hours): Intake/Output Summary (Last 24 hours) at 10/10/2022 1533  Last data filed at 10/10/2022 1324  Gross per 24 hour   Intake 680 ml   Output --   Net 680 ml       Physical Exam:     Physical Exam  General: breathing well on room air, no acute distress  HEENT: NC/AT, PERRL, EOM - normal  Neck: Supple  Pulm/Chest: Normal chest wall expansion, clear breath sounds on both side, no wheezing/rhonchi or crackles appreciated  CVS: RRR, normal S1&S2, no murmur appreciated, capillary refill <2s  MSK: move all 4 extremities spontaneously, no hand tremors  Skin: warm      Additional Data:     Labs: Invalid input(s): LABALBU  Results from last 7 days   Lab Units 10/07/22  1539   INR  3 03*       * I Have Reviewed All Lab Data Listed Above  * Additional Pertinent Lab Tests Reviewed: Nel 66 Admission Reviewed    Imaging:      I have reviewed pertinent imaging        Recent Cultures (last 7 days):           Last 24 Hours Medication List:   Current Facility-Administered Medications   Medication Dose Route Frequency Provider Last Rate   • acetaminophen  650 mg Oral Q4H PRN Clemente Trejo MD     • acetaminophen  650 mg Oral Q4H PRN Clemente Trejo MD     • acetaminophen  975 mg Oral Q6H PRN Clemente Trejo MD     • aluminum-magnesium hydroxide-simethicone  30 mL Oral Q4H PRN Clemente Trejo MD     • ARIPiprazole  10 mg Oral Daily Darin Montelongo DO     • atorvastatin  40 mg Oral Daily With Zak Cheng MD     • benztropine  1 mg Intramuscular Q4H PRN Max 6/day Clemente Trejo MD     • benztropine  0 5 mg Oral Q4H PRN Max 6/day Laura Barron MD     • carvedilol  25 mg Oral BID With Meals Sherry Ervin MD     • famotidine  40 mg Oral BID Nicko Ochoa PA-C     • furosemide  20 mg Oral Daily Apolinar French MD     • haloperidol lactate  5 mg Intramuscular Q4H PRN Max 4/day Apolinar French MD     • hydrOXYzine HCL  25 mg Oral Q6H PRN Max 4/day Apolinar French MD     • LORazepam  1 mg Intramuscular Q6H PRN Max 3/day Apolinar French MD     • LORazepam  0 25 mg Oral HS John Duran, DO     • melatonin  3 mg Oral HS Apolinar French MD     • mirtazapine  7 5 mg Oral HS John Duran, DO     • pantoprazole  40 mg Oral BID Nicko Ochoa PA-C     • polyethylene glycol  17 g Oral Daily PRN Apolinar French MD     • potassium chloride  20 mEq Oral Daily Laura Marshall MD     • propranolol  5 mg Oral Q8H PRN Apolinar French MD     • risperiDONE  0 25 mg Oral Q4H PRN Max 6/day Laura Marshall MD     • risperiDONE  0 5 mg Oral Q4H PRN Max 3/day Apolinar French MD     • risperiDONE  1 mg Oral Q2H PRN Max 3/day Apolinar French MD     • senna-docusate sodium  1 tablet Oral Daily PRN Apolinar French MD     • [START ON 10/11/2022] sertraline  200 mg Oral Daily Jodelcleveland Lopezes, DO     • traZODone  100 mg Oral HS John Duran, DO     • warfarin  4 mg Oral Daily (warfarin) Ceasar Quintanilla MD          Today, Patient Was Seen By: Ceasar Quintanilla    ** Please Note: Dragon 360 Dictation voice to text software may have been used in the creation of this document   **

## 2022-10-10 NOTE — ASSESSMENT & PLAN NOTE
· Patient stated he has occasional hand tremors could be contributed by thyroid issue  · Thyroid ultrasound was normal  · Recommended endocrinology consult

## 2022-10-10 NOTE — CASE MANAGEMENT
10/10/22 0848   Team Meeting   Meeting Type Daily Rounds   Initial Conference Date 10/10/22   Team Members Present   Team Members Present Physician;Nurse;   Physician Team Member Dr Sal Rodriguez Team Member DEVIKA [facility] Management Team Member Naya   Patient/Family Present   Patient Present No   Patient's Family Present No     2/4 anxiety, 5/10 depression, bladder scanned multiple times, somatic, anxious, med compliant, slept, cooperative, visible

## 2022-10-10 NOTE — PLAN OF CARE
Problem: Ineffective Coping  Goal: Free from restraint events  Description: - Utilize least restrictive measures   - Provide behavioral interventions   - Redirect inappropriate behaviors   Outcome: Progressing     Problem: Ineffective Coping  Goal: Participates in unit activities  Description: Interventions:  - Provide therapeutic environment   - Provide required programming   - Redirect inappropriate behaviors   Outcome: Progressing

## 2022-10-10 NOTE — NURSING NOTE
Annamae Reach is anxious, pleasant, and cooperative on approach  Annamae Reach denies all  Annamae Reach denies pain  Annamae Reach states he doesn't eat well  Annamae Reach states he is sleeping fairly  Annamae Reach states his depression is 5/10  Annamae Reach states his anxiety is 2/4  Annamae Reach is preoccupied  Annamae Reach is med compliant  Annamae Reach does attend group  Annamae Reach has a flat affect  Will continue to monitor and assess for safety

## 2022-10-10 NOTE — PLAN OF CARE
Problem: DISCHARGE PLANNING  Goal: Discharge to home or other facility with appropriate resources  Description: INTERVENTIONS:  - Identify barriers to discharge w/patient and caregiver  - Arrange for needed discharge resources and transportation as appropriate  - Identify discharge learning needs (meds, wound care, etc )  - Arrange for interpretive services to assist at discharge as needed  - Refer to Case Management Department for coordinating discharge planning if the patient needs post-hospital services based on physician/advanced practitioner order or complex needs related to functional status, cognitive ability, or social support system  Outcome: Not Progressing     Problem: Ineffective Coping  Goal: Identifies ineffective coping skills  Outcome: Not Progressing  Goal: Identifies healthy coping skills  Outcome: Not Progressing  Goal: Demonstrates healthy coping skills  Outcome: Not Progressing  Goal: Participates in unit activities  Description: Interventions:  - Provide therapeutic environment   - Provide required programming   - Redirect inappropriate behaviors   Outcome: Not Progressing  Goal: Patient/Family participate in treatment and DC plans  Description: Interventions:  - Provide therapeutic environment  Outcome: Not Progressing  Goal: Patient/Family verbalizes awareness of resources  Outcome: Not Progressing  Goal: Understands least restrictive measures  Description: Interventions:  - Utilize least restrictive behavior  Outcome: Not Progressing  Goal: Free from restraint events  Description: - Utilize least restrictive measures   - Provide behavioral interventions   - Redirect inappropriate behaviors   Outcome: Not Progressing     Problem: Anxiety  Goal: Anxiety is at manageable level  Description: Interventions:  - Assess and monitor patient's anxiety level     - Monitor for signs and symptoms (heart palpitations, chest pain, shortness of breath, headaches, nausea, feeling jumpy, restlessness, irritable, apprehensive)  - Collaborate with interdisciplinary team and initiate plan and interventions as ordered  - [place] patient to unit/surroundings  - Explain treatment plan  - Encourage participation in care  - Encourage verbalization of concerns/fears  - Identify coping mechanisms  - Assist in developing anxiety-reducing skills  - Administer/offer alternative therapies  - Limit or eliminate stimulants  Outcome: Not Progressing     Problem: Nutrition/Hydration-ADULT  Goal: Nutrient/Hydration intake appropriate for improving, restoring or maintaining nutritional needs  Description: Monitor and assess patient's nutrition/hydration status for malnutrition  Collaborate with interdisciplinary team and initiate plan and interventions as ordered  Monitor patient's weight and dietary intake as ordered or per policy  Utilize nutrition screening tool and intervene as necessary  Determine patient's food preferences and provide high-protein, high-caloric foods as appropriate       INTERVENTIONS:  - Monitor oral intake, urinary output, labs, and treatment plans  - Assess nutrition and hydration status and recommend course of action  - Evaluate amount of meals eaten  - Assist patient with eating if necessary   - Allow adequate time for meals  - Recommend/ encourage appropriate diets, oral nutritional supplements, and vitamin/mineral supplements  - Order, calculate, and assess calorie counts as needed  - Recommend, monitor, and adjust tube feedings and TPN/PPN based on assessed needs  - Assess need for intravenous fluids  - Provide specific nutrition/hydration education as appropriate  - Include patient/family/caregiver in decisions related to nutrition  Outcome: Not Progressing

## 2022-10-11 ENCOUNTER — APPOINTMENT (INPATIENT)
Dept: NON INVASIVE DIAGNOSTICS | Facility: HOSPITAL | Age: 62
DRG: 880 | End: 2022-10-11
Payer: COMMERCIAL

## 2022-10-11 ENCOUNTER — APPOINTMENT (OUTPATIENT)
Dept: ULTRASOUND IMAGING | Facility: HOSPITAL | Age: 62
DRG: 880 | End: 2022-10-11
Payer: COMMERCIAL

## 2022-10-11 PROBLEM — R79.1 SUPRATHERAPEUTIC INR: Status: ACTIVE | Noted: 2022-10-11

## 2022-10-11 LAB
AORTIC ROOT: 3.7 CM
AORTIC VALVE MEAN VELOCITY: 20.7 M/S
APICAL FOUR CHAMBER EJECTION FRACTION: 63 %
AV AREA BY CONTINUOUS VTI: 0.9 CM2
AV AREA PEAK VELOCITY: 1 CM2
AV LVOT MEAN GRADIENT: 2 MMHG
AV LVOT PEAK GRADIENT: 4 MMHG
AV MEAN GRADIENT: 20 MMHG
AV PEAK GRADIENT: 35 MMHG
AV VALVE AREA: 0.94 CM2
AV VELOCITY RATIO: 0.32
DOP CALC AO PEAK VEL: 2.94 M/S
DOP CALC AO VTI: 57.31 CM
DOP CALC LVOT AREA: 3.14 CM2
DOP CALC LVOT DIAMETER: 2 CM
DOP CALC LVOT PEAK VEL VTI: 17.09 CM
DOP CALC LVOT PEAK VEL: 0.95 M/S
DOP CALC LVOT STROKE INDEX: 27.3 ML/M2
DOP CALC LVOT STROKE VOLUME: 53.66 CM3
E WAVE DECELERATION TIME: 234 MS
FRACTIONAL SHORTENING: 36 % (ref 28–44)
INR PPP: 3.58 (ref 0.84–1.19)
INTERVENTRICULAR SEPTUM IN DIASTOLE (PARASTERNAL SHORT AXIS VIEW): 1.5 CM
INTERVENTRICULAR SEPTUM: 1.5 CM (ref 0.6–1.1)
LAAS-AP2: 16.8 CM2
LAAS-AP4: 17.9 CM2
LDH SERPL-CCNC: 613 U/L (ref 313–618)
LEFT ATRIUM SIZE: 4.3 CM
LEFT INTERNAL DIMENSION IN SYSTOLE: 2.8 CM (ref 2.1–4)
LEFT VENTRICULAR INTERNAL DIMENSION IN DIASTOLE: 4.4 CM (ref 3.5–6)
LEFT VENTRICULAR POSTERIOR WALL IN END DIASTOLE: 1.4 CM
LEFT VENTRICULAR STROKE VOLUME: 57 ML
LVSV (TEICH): 57 ML
MV E'TISSUE VEL-SEP: 7 CM/S
MV PEAK A VEL: 0.73 M/S
MV PEAK E VEL: 74 CM/S
MV STENOSIS PRESSURE HALF TIME: 68 MS
MV VALVE AREA P 1/2 METHOD: 3.24 CM2
PROTHROMBIN TIME: 36.3 SECONDS (ref 11.6–14.5)
RETICS # AUTO: NORMAL 10*3/UL (ref 14356–105094)
RETICS # CALC: 1.45 % (ref 0.37–1.87)
RIGHT ATRIUM AREA SYSTOLE A4C: 15.9 CM2
RIGHT VENTRICLE ID DIMENSION: 3.9 CM
SL CV LEFT ATRIUM LENGTH A2C: 4.6 CM
SL CV LV EF: 63
SL CV PED ECHO LEFT VENTRICLE DIASTOLIC VOLUME (MOD BIPLANE) 2D: 85 ML
SL CV PED ECHO LEFT VENTRICLE SYSTOLIC VOLUME (MOD BIPLANE) 2D: 29 ML
TR MAX PG: 35 MMHG
TR PEAK VELOCITY: 3 M/S
TRICUSPID VALVE PEAK REGURGITATION VELOCITY: 2.96 M/S

## 2022-10-11 PROCEDURE — 83615 LACTATE (LD) (LDH) ENZYME: CPT | Performed by: INTERNAL MEDICINE

## 2022-10-11 PROCEDURE — 76705 ECHO EXAM OF ABDOMEN: CPT

## 2022-10-11 PROCEDURE — 93306 TTE W/DOPPLER COMPLETE: CPT

## 2022-10-11 PROCEDURE — 93306 TTE W/DOPPLER COMPLETE: CPT | Performed by: INTERNAL MEDICINE

## 2022-10-11 PROCEDURE — 83010 ASSAY OF HAPTOGLOBIN QUANT: CPT | Performed by: INTERNAL MEDICINE

## 2022-10-11 PROCEDURE — 85610 PROTHROMBIN TIME: CPT | Performed by: PHYSICIAN ASSISTANT

## 2022-10-11 PROCEDURE — 99232 SBSQ HOSP IP/OBS MODERATE 35: CPT | Performed by: STUDENT IN AN ORGANIZED HEALTH CARE EDUCATION/TRAINING PROGRAM

## 2022-10-11 PROCEDURE — 85045 AUTOMATED RETICULOCYTE COUNT: CPT | Performed by: INTERNAL MEDICINE

## 2022-10-11 RX ORDER — WARFARIN SODIUM 2 MG/1
4 TABLET ORAL
Status: DISCONTINUED | OUTPATIENT
Start: 2022-10-13 | End: 2022-10-13

## 2022-10-11 RX ORDER — ARIPIPRAZOLE 5 MG/1
5 TABLET ORAL DAILY
Status: DISCONTINUED | OUTPATIENT
Start: 2022-10-12 | End: 2022-10-11

## 2022-10-11 RX ORDER — BISACODYL 10 MG
10 SUPPOSITORY, RECTAL RECTAL ONCE
Status: COMPLETED | OUTPATIENT
Start: 2022-10-11 | End: 2022-10-11

## 2022-10-11 RX ORDER — WARFARIN SODIUM 2 MG/1
2 TABLET ORAL
Status: DISCONTINUED | OUTPATIENT
Start: 2022-10-11 | End: 2022-10-12

## 2022-10-11 RX ORDER — ARIPIPRAZOLE 10 MG/1
10 TABLET ORAL DAILY
Status: DISCONTINUED | OUTPATIENT
Start: 2022-10-12 | End: 2022-10-24

## 2022-10-11 RX ORDER — AMLODIPINE BESYLATE 5 MG/1
5 TABLET ORAL DAILY
Status: DISCONTINUED | OUTPATIENT
Start: 2022-10-11 | End: 2022-10-13

## 2022-10-11 RX ADMIN — MIRTAZAPINE 7.5 MG: 7.5 TABLET, FILM COATED ORAL at 21:11

## 2022-10-11 RX ADMIN — AMLODIPINE BESYLATE 5 MG: 5 TABLET ORAL at 15:29

## 2022-10-11 RX ADMIN — RISPERIDONE 0.25 MG: 0.25 TABLET ORAL at 18:01

## 2022-10-11 RX ADMIN — PANTOPRAZOLE SODIUM 40 MG: 40 TABLET, DELAYED RELEASE ORAL at 08:14

## 2022-10-11 RX ADMIN — WARFARIN SODIUM 2 MG: 2 TABLET ORAL at 17:19

## 2022-10-11 RX ADMIN — TRAZODONE HYDROCHLORIDE 100 MG: 100 TABLET ORAL at 21:11

## 2022-10-11 RX ADMIN — CARVEDILOL 25 MG: 12.5 TABLET, FILM COATED ORAL at 08:14

## 2022-10-11 RX ADMIN — CARVEDILOL 25 MG: 12.5 TABLET, FILM COATED ORAL at 17:20

## 2022-10-11 RX ADMIN — ACETAMINOPHEN 650 MG: 325 TABLET ORAL at 15:37

## 2022-10-11 RX ADMIN — MELATONIN TAB 3 MG 3 MG: 3 TAB at 21:11

## 2022-10-11 RX ADMIN — BISACODYL 10 MG: 10 SUPPOSITORY RECTAL at 10:12

## 2022-10-11 RX ADMIN — HYDROXYZINE HYDROCHLORIDE 25 MG: 25 TABLET, FILM COATED ORAL at 13:57

## 2022-10-11 RX ADMIN — POTASSIUM CHLORIDE 20 MEQ: 1500 TABLET, EXTENDED RELEASE ORAL at 08:14

## 2022-10-11 RX ADMIN — ATORVASTATIN CALCIUM 40 MG: 40 TABLET, FILM COATED ORAL at 17:20

## 2022-10-11 RX ADMIN — FUROSEMIDE 20 MG: 40 TABLET ORAL at 08:14

## 2022-10-11 RX ADMIN — FAMOTIDINE 40 MG: 20 TABLET ORAL at 08:14

## 2022-10-11 RX ADMIN — LORAZEPAM 0.25 MG: 0.5 TABLET ORAL at 21:11

## 2022-10-11 RX ADMIN — SERTRALINE HYDROCHLORIDE 200 MG: 100 TABLET ORAL at 08:14

## 2022-10-11 RX ADMIN — FAMOTIDINE 40 MG: 20 TABLET ORAL at 17:19

## 2022-10-11 RX ADMIN — ARIPIPRAZOLE 10 MG: 10 TABLET ORAL at 08:14

## 2022-10-11 RX ADMIN — PANTOPRAZOLE SODIUM 40 MG: 40 TABLET, DELAYED RELEASE ORAL at 21:11

## 2022-10-11 NOTE — PLAN OF CARE
Problem: Ineffective Coping  Goal: Identifies ineffective coping skills  Outcome: Progressing  Goal: Identifies healthy coping skills  Outcome: Progressing  Goal: Demonstrates healthy coping skills  Outcome: Progressing  Goal: Patient/Family participate in treatment and DC plans  Description: Interventions:  - Provide therapeutic environment  Outcome: Progressing  Goal: Patient/Family verbalizes awareness of resources  Outcome: Progressing  Goal: Understands least restrictive measures  Description: Interventions:  - Utilize least restrictive behavior  Outcome: Progressing  Goal: Free from restraint events  Description: - Utilize least restrictive measures   - Provide behavioral interventions   - Redirect inappropriate behaviors   Outcome: Progressing     Problem: Anxiety  Goal: Anxiety is at manageable level  Description: Interventions:  - Assess and monitor patient's anxiety level  - Monitor for signs and symptoms (heart palpitations, chest pain, shortness of breath, headaches, nausea, feeling jumpy, restlessness, irritable, apprehensive)  - Collaborate with interdisciplinary team and initiate plan and interventions as ordered  - [place] patient to unit/surroundings  - Explain treatment plan  - Encourage participation in care  - Encourage verbalization of concerns/fears  - Identify coping mechanisms  - Assist in developing anxiety-reducing skills  - Administer/offer alternative therapies  - Limit or eliminate stimulants  Outcome: Progressing     Problem: Nutrition/Hydration-ADULT  Goal: Nutrient/Hydration intake appropriate for improving, restoring or maintaining nutritional needs  Description: Monitor and assess patient's nutrition/hydration status for malnutrition  Collaborate with interdisciplinary team and initiate plan and interventions as ordered  Monitor patient's weight and dietary intake as ordered or per policy  Utilize nutrition screening tool and intervene as necessary   Determine patient's food preferences and provide high-protein, high-caloric foods as appropriate       INTERVENTIONS:  - Monitor oral intake, urinary output, labs, and treatment plans  - Assess nutrition and hydration status and recommend course of action  - Evaluate amount of meals eaten  - Assist patient with eating if necessary   - Allow adequate time for meals  - Recommend/ encourage appropriate diets, oral nutritional supplements, and vitamin/mineral supplements  - Order, calculate, and assess calorie counts as needed  - Recommend, monitor, and adjust tube feedings and TPN/PPN based on assessed needs  - Assess need for intravenous fluids  - Provide specific nutrition/hydration education as appropriate  - Include patient/family/caregiver in decisions related to nutrition  Outcome: Progressing

## 2022-10-11 NOTE — ASSESSMENT & PLAN NOTE
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

## 2022-10-11 NOTE — ASSESSMENT & PLAN NOTE
B/P controlled and continue atorvastatin.   Patient does have flank pain, but had negative ultrasound previously  No kidney stone

## 2022-10-11 NOTE — NURSING NOTE
Katherine Colby is anxious  Unable to redirect pt  Provided PRN Atarax 25mg  Will continue to monitor and assess for safety

## 2022-10-11 NOTE — CASE MANAGEMENT
10/11/22 0906   Team Meeting   Meeting Type Daily Rounds   Initial Conference Date 10/11/22   Team Members Present   Team Members Present Physician;Nurse;   Physician Team Member Dr Emily Garcia; Dr Walter Isidro Team Member [facility] Management Team Member Naya   Patient/Family Present   Patient Present No   Patient's Family Present No     Cardiac med increased, bizarre behavior, poor sleep, fair appetite, ECHO completed, med compliant, visible, elevated BP

## 2022-10-11 NOTE — PROGRESS NOTES
Progress Note - [address] Neha Moses 58 y o  male MRN: [id number]  Unit/Bed#: [facility] 773-98 Encounter: [id number]    Assessment/Plan   Principal Problem:    SHIMON (generalized anxiety disorder)  Active Problems:    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 attack    Hypertension    GERD (gastroesophageal reflux disease)    Atrial fibrillation (HCC)    Class 2 obesity in adult    History of stroke    Antiphospholipid antibody with hypercoagulable state ([address] )    Hyperbilirubinemia    CORIE (obstructive sleep apnea)    Occasional tremors    PVD (peripheral vascular disease) (HCC)    Medical clearance for incarceration    Mild protein-calorie malnutrition (HCC)    Sinus arrhythmia      Recommended Treatment:   No psychopharmacologic changes necessary at this moment; will continue to assess daily for further optimization  Endocrine consult placed; symptomatic hyperthyroidism of uncertain etiology, evaluation and recommendations appreciated  ECHO - follow-up  Cardiology on consult, appreciate recs   Medicine to follow on INR and bilirubin     Continue with pharmacotherapy, group therapy, milieu therapy and occupational therapy  Continue to assess for adverse medication side effects  Encourage Hans Vasquezbuddy 22 to participate in nonverbal forms of therapy including journaling and art/music therapy  Continue frequent safety checks and vitals per unit protocol  Continue to engage CM/SW to assist with collateral, disposition planning, and the implementation of an individualized, patient-centered plan of care  Continue medical management by medical team   Case discussed with treatment team     Legal Status: 201  ------------------------------------------------------------    Subjective: All documentation including nursing notes, medication history to ensure medication adherence on the unit, labs, and vitals were reviewed   Telly Pfeiffer was evaluated this morning for continuity of care and no acute distress noted throughout the evaluation  Over the past 24 hours per nursing report, Mariah Marie had 1 small bowel movement per report yesterday, preoccupied with urination and defecation cooperative on the unit and compliant with medications  Today, Mariah Marie is consenting for safety on the unit  Mariah Marie reports feeling "anxious " Mariah Marie notes having “a little Shukri” sleep  Mariah Marie states having a “light” appetite  Mariah Marie has been taking the medications as prescribed and reporting no side effects  Patient was seen and examined today  We discussed his “nice day” yesterday and states how his little bit more anxious today because he has not been able to have a bowel movement and he has a new roommate and this causes him a little bit of distress  Patient states that he thinks that the Abilify is making him unable to go to the bathroom  Patient states that his roommate heard him pacing and chanting all night and that this writer should ask his roommate for the rest of the questions  Reinforce that I need to hear from this patient how he is doing  Cardiology then came to do the echo  Mariah Marie denies suicidal ideations  Mariah Marie denies homicidal ideations  Regarding hallucinations, Mariah Marie denied does not appear to be internally stimulated    PRNs overnight:  None  VS: Reviewed, within normal limits    Progress Toward Goals: slow improvement    Psychiatric Review of Systems:  Behavior over the last 24 hours:  improved  Sleep: normal  Appetite: normal, Reduced intake for fear of not having a bowel move  Medication side effects: No   ROS: all other systems are negative    Vital signs in last 24 hours:  Temp:  [97 1 °F (36 2 °C)-97 6 °F (36 4 °C)] 97 6 °F (36 4 °C)  HR:  [] 96  Resp:  [18-20] 20  BP: (157-202)/() 202/114    Laboratory results:  I have personally reviewed all pertinent laboratory/tests results    Recent Results (from the past 48 hour(s))   ECG 12 lead    Collection Time: 10/10/22  1:38 PM   Result Value Ref Range Ventricular Rate 84 BPM    Atrial Rate 84 BPM    AL Interval 150 ms    QRSD Interval 82 ms    QT Interval 374 ms    QTC Interval 441 ms    P Axis 41 degrees    QRS Axis 8 degrees    T Wave Axis 1 degrees   TSH, 3rd generation with Free T4 reflex    Collection Time: 10/10/22  2:04 PM   Result Value Ref Range    TSH 3RD GENERATON 0 308 (L) 0 450 - 4 500 uIU/mL   T4, free    Collection Time: 10/10/22  2:04 PM   Result Value Ref Range    Free T4 1 40 0 76 - 1 46 ng/dL   Protime-INR    Collection Time: 10/11/22  6:54 AM   Result Value Ref Range    Protime 36 3 (H) 11 6 - 14 5 seconds    INR 3 58 (H) 0 84 - 1 19   Lactate dehydrogenase    Collection Time: 10/11/22  6:54 AM   Result Value Ref Range     313 - 618 U/L   Retic Count    Collection Time: 10/11/22  6:54 AM   Result Value Ref Range    Retic Ct Abs 68,900 14,356 - 105,094    Retic Ct Pct 1 45 0 37 - 1 87 %         Mental Status Evaluation:    Appearance:  casually dressed, marginal hygiene, looks stated age, bearded, Overtly appearing  male, in no apparent acute distress, good eye   Behavior:  cooperative   Speech:  normal rate and volume   Mood:  "Anxious"   Affect:  constricted, Brighter   Thought Process:  perseverative, negative thinking   Associations: intact associations   Thought Content:  some paranoia, negative thoughts, preoccupied with Urination and defecation   Perceptual Disturbances: Denies auditory or visual hallucinations and Does not appear to be responding to internal stimuli   Risk Potential: Suicidal ideation - None at present  Homicidal ideation - None at present  Potential for aggression - Not at present   Sensorium:  oriented to person, place and time/date   Memory:  recent and remote memory grossly intact   Consciousness:  alert and awake   Attention/Concentration: attention span and concentration are age appropriate   Insight:  limited   Judgment: limited   Gait/Station: slow gait   Motor Activity: no abnormal movements Current Medications:  Current Facility-Administered Medications   Medication Dose Route Frequency Provider Last Rate   • acetaminophen  650 mg Oral Q4H PRN Olegario Benítez, MD     • acetaminophen  650 mg Oral Q4H PRN Olegario Benítez, MD     • acetaminophen  975 mg Oral Q6H PRN Olegario Benítez, MD     • aluminum-magnesium hydroxide-simethicone  30 mL Oral Q4H PRN Olegario Benítez MD     • [START ON 10/12/2022] ARIPiprazole  5 mg Oral Daily De Dios Sangita, DO     • atorvastatin  40 mg Oral Daily With Dinner Olegario Benítez MD     • benztropine  1 mg Intramuscular Q4H PRN Max 6/day Olegario Benítez MD     • benztropine  0 5 mg Oral Q4H PRN Max 6/day Laura Quiroga MD     • carvedilol  25 mg Oral BID With Meals Marisel Cheema MD     • famotidine  40 mg Oral BID Jacques Zamora, PA-C     • furosemide  20 mg Oral Daily Olegario Benítez MD     • haloperidol lactate  5 mg Intramuscular Q4H PRN Max 4/day Olegario Benítez MD     • hydrOXYzine HCL  25 mg Oral Q6H PRN Max 4/day Olegario Benítez MD     • LORazepam  1 mg Intramuscular Q6H PRN Max 3/day Olegario Benítez MD     • LORazepam  0 25 mg Oral HS De Dios Sangita, DO     • melatonin  3 mg Oral HS Olegario Benítez MD     • mirtazapine  7 5 mg Oral HS De Dios Sangita, DO     • pantoprazole  40 mg Oral BID Jacques Zamora, PA-C     • polyethylene glycol  17 g Oral Daily PRN Olegario Benítez MD     • potassium chloride  20 mEq Oral Daily Olegario Benítez, MD     • propranolol  5 mg Oral Q8H PRN Olegario Benítez, MD     • risperiDONE  0 25 mg Oral Q4H PRN Max 6/day Olegario Benítez MD     • risperiDONE  0 5 mg Oral Q4H PRN Max 3/day Olegario Benítez MD     • risperiDONE  1 mg Oral Q2H PRN Max 3/day Olegario Benítez MD     • senna-docusate sodium  1 tablet Oral Daily PRN Olegario Benítez, MD     • sertraline  200 mg Oral Daily De Dios Sangita, DO     • traZODone  100 mg Oral HS De Dios Sangita, DO     • warfarin  4 mg Oral Daily (warfarin) Oliver Almazan MD         Suicide/Homicide Risk Assessment:  Risk of Harm to Self:   Based on today's assessment, Lucas Barrios presents the following risk of harm to self: minimal    Risk of Harm to Others:  Based on today's assessment, Lucas Barrios presents the following risk of harm to others: minimal    The following interventions are recommended: behavioral checks every 7 minutes, continued hospitalization on locked unit    Behavioral Health Medications: All current active meds have been reviewed  Changes as in plan section above  Risks, benefits and possible side effects of Medications:   Risks, benefits, and possible side effects of medications explained to patient and patient verbalizes understanding  Counseling / Coordination of Care:  Patient's progress discussed with staff in treatment team meeting  Medications, treatment progress and treatment plan reviewed with patient  Foster Huber 10/11/22  Psychiatry Resident, PGY-II    This note was completed in part utilizing [employer] Direct Software  Grammatical, translation, syntax errors, random word insertions, spelling mistakes, and incomplete sentences may be an occasional consequence of this system secondary to software limitations with voice recognition, ambient noise, and hardware issues  If you have any questions or concerns about the content, text, or information contained within the body of this dictation, please contact the provider for clarification

## 2022-10-11 NOTE — NURSING NOTE
Patient sitting in dayroom at start of shift, soft spoken and poor eye contact on approach  Patient initially uncooperative with assessment questions, preoccupied with needing vitals done and refusing BH assessment questions, although does report 7/10 generalized pain  Patient initially agreeable on Tylenol 975mg, requesting this be changed to 650mg when approached  Given Tylenol 650 at 1537, patient states this was effective  Patient cooperative with assessment questions when approached during evening med pass, reporting 6/10 depression and 3/4 anxiety, denies SI/HI/AVH  Patient reporting inability to urinate, mumbling to self that he does not want his "bladder pierced" by a catheter  Patient reassured this is not the case, suggested interventions patient can try  Patient raising voice, shouting at staff repetitively that he cannot go unless staff watch him  Patient attempted to be reassured, continues to shout  Patient given PRN risperdal 0 25 at 1801 for mild agitation, this was effective  Patient able to urinate with interventions  Patient notably calmer on the unit for the remainder of the shift  Cooperative with ultrasound  Compliant with medications and meals, appetite good  Patient ambulating with RW due to unsteadiness at this time  No further signs of distress noted

## 2022-10-11 NOTE — NURSING NOTE
Patient is anxious, preoccupied with having no BM  PRN suppository given at 1012, effective  Patient is calm, visible, and social  Patient reports anxiety 3/4, and 5/10 for depression  PRN requested PRN for anxiety  Atarax given at 1357, effective  No behavioral issues noted, will continue to monitor

## 2022-10-11 NOTE — QUICK NOTE
Supratherapeutic INR  Assessment & Plan  · INR at 3 58 today  Goal 2-3  · Will decrease warfarin dose from 4mg to 2mg daily for 2 days  · Continue to trend INR  · May need to adjust scheduled warfarin dosing    Hyperbilirubinemia  Assessment & Plan  · T  bilil at 3 57 on 09/30/22  This appears to be chronic however bilirubin has been gradually increasing over the last year  · CT abdomen pelvis on 07/28/22 shows s/p cholecystectomy and Liver/billiary tree unremarkable  · LD and retic count WNL   Liver enzymes stable  · Likely Gilbert's syndrome  · Haptoglobin, CMP, and direct bili pending  · US RUQ pending

## 2022-10-12 LAB
HAPTOGLOB SERPL-MCNC: 76 MG/DL (ref 32–363)
INR PPP: 3.65 (ref 0.84–1.19)
PROTHROMBIN TIME: 36.9 SECONDS (ref 11.6–14.5)

## 2022-10-12 PROCEDURE — 85610 PROTHROMBIN TIME: CPT | Performed by: PHYSICIAN ASSISTANT

## 2022-10-12 PROCEDURE — 99232 SBSQ HOSP IP/OBS MODERATE 35: CPT | Performed by: INTERNAL MEDICINE

## 2022-10-12 PROCEDURE — 99232 SBSQ HOSP IP/OBS MODERATE 35: CPT | Performed by: STUDENT IN AN ORGANIZED HEALTH CARE EDUCATION/TRAINING PROGRAM

## 2022-10-12 RX ORDER — SPIRONOLACTONE 25 MG/1
25 TABLET ORAL DAILY
Status: DISCONTINUED | OUTPATIENT
Start: 2022-10-12 | End: 2022-10-12

## 2022-10-12 RX ORDER — SPIRONOLACTONE 25 MG/1
25 TABLET ORAL DAILY
Status: DISCONTINUED | OUTPATIENT
Start: 2022-10-13 | End: 2022-10-13

## 2022-10-12 RX ADMIN — FUROSEMIDE 20 MG: 40 TABLET ORAL at 08:41

## 2022-10-12 RX ADMIN — MELATONIN TAB 3 MG 3 MG: 3 TAB at 21:20

## 2022-10-12 RX ADMIN — TRAZODONE HYDROCHLORIDE 100 MG: 100 TABLET ORAL at 21:21

## 2022-10-12 RX ADMIN — PANTOPRAZOLE SODIUM 40 MG: 40 TABLET, DELAYED RELEASE ORAL at 21:21

## 2022-10-12 RX ADMIN — SERTRALINE HYDROCHLORIDE 200 MG: 100 TABLET ORAL at 08:40

## 2022-10-12 RX ADMIN — FAMOTIDINE 40 MG: 20 TABLET ORAL at 08:40

## 2022-10-12 RX ADMIN — ATORVASTATIN CALCIUM 40 MG: 40 TABLET, FILM COATED ORAL at 17:04

## 2022-10-12 RX ADMIN — CARVEDILOL 25 MG: 12.5 TABLET, FILM COATED ORAL at 07:57

## 2022-10-12 RX ADMIN — POTASSIUM CHLORIDE 20 MEQ: 1500 TABLET, EXTENDED RELEASE ORAL at 08:40

## 2022-10-12 RX ADMIN — PANTOPRAZOLE SODIUM 40 MG: 40 TABLET, DELAYED RELEASE ORAL at 08:40

## 2022-10-12 RX ADMIN — ARIPIPRAZOLE 10 MG: 10 TABLET ORAL at 14:04

## 2022-10-12 RX ADMIN — CARVEDILOL 25 MG: 12.5 TABLET, FILM COATED ORAL at 17:04

## 2022-10-12 RX ADMIN — LORAZEPAM 0.25 MG: 0.5 TABLET ORAL at 21:21

## 2022-10-12 RX ADMIN — FAMOTIDINE 40 MG: 20 TABLET ORAL at 17:04

## 2022-10-12 NOTE — PROGRESS NOTES
10/12/22 1000   Activity/Group Checklist   Group Other (Comment)  (self care group)   Attendance Attended   Attendance Duration (min) 16-30   Interactions Interacted appropriately   Affect/Mood Appropriate   Goals Achieved Identified feelings; Identified triggers; Able to listen to others; Able to engage in interactions; Able to manage/cope with feelings; Able to self-disclose; Able to recieve feedback; Able to give feedback to another

## 2022-10-12 NOTE — NURSING NOTE
Patient remains anxious and depressed  Patient refused scheduled Abilify, stated that" he makes him weak"  Around 1400 patient requested Abilify, and was given at 1404  Patient is visible, calm, and cooperative  Patient in bed at the moment  No distress noted  Will continue to monitor

## 2022-10-12 NOTE — PROGRESS NOTES
CARDIOLOGY INPATIENT FOLLOW-UP PROGRESS NOTE  *-*-*-*-*-*-*-*-*-*-*-*-*-*-*-*-*-*-*-*-*-*-*-*-*-*-*-*-*-*-*-*-*-*-*-*-*-*-*-*-*-*-*-*-*-*-*-*-*-*-*-*-*-*-  Eric Melvina DATE: 10/12/22 3:42 PM   AUTHOR: Vazquez March  PATIENT: Ej Pitts   1960    [id number]   58 y o    male  INPATIENT HOSPITALIST PHYSICIAN: Dl Olivera MD  DATE OF ADMISSION: 9/28/2022  6:28 PM; LENGTH OF STAY: 13 days  *-*-*-*-*-*-*-*-*-*-*-*-*-*-*-*-*-*-*-*-*-*-*-*-*-*-*-*-*-*-*-*-*-*-*-*-*-*-*-*-*-*-*-*-*-*-*-*-*-*-*-*-*-*-    CARDIOLOGY ASSESSMENT:  · General anxiety disorder and panic attacks on multiple psychiatric medications    · Hypertension with orthostatic hypotension  · Probable hyperthyroidism not on treatment  · Paroxysmal atrial fibrillation in sinus rhythm  · Bicuspid aortic valve without significant stenosis by last echocardiogram which was a year and half ago  · Significant replacement of the ascending aorta a aneurysm with a matthew reconstruction in 2014 without replacing bicuspid aortic valve  · Several lacunar strip CVAs  · Peripheral vascular disease  · Hyperlipidemia on atorvastatin with excellent numbers  · Obstructive sleep apnea  · 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  · Class 2 obesity  · GERD  · Loop recorder in place    *-*-*-*-*-*-*-*-*-*-*-*-*-*-*-*-*-*-*-*-*-*-*-*-*-*-*-*-*-*-*-*-*-*-*-*-*-*-*-*-*-*-*-*-*-*-*-*-*-*-*-*-*-*-    CURRENT SCHEDULED MEDICATIONS:    Current Facility-Administered Medications:   •  acetaminophen (TYLENOL) tablet 650 mg, 650 mg, Oral, Q4H PRN, Dl Olivera MD, 650 mg at 10/03/22 2150  •  acetaminophen (TYLENOL) tablet 650 mg, 650 mg, Oral, Q4H PRN, Dl Olivera MD, 650 mg at 10/11/22 1537  •  acetaminophen (TYLENOL) tablet 975 mg, 975 mg, Oral, Q6H PRN, Dl Olivera MD  •  aluminum-magnesium hydroxide-simethicone (MYLANTA) oral suspension 30 mL, 30 mL, Oral, Q4H PRN, Dl Olivera MD  •  amLODIPine (NORVASC) tablet 5 mg, 5 mg, Oral, Daily, Rujul DO Rustam, 5 mg at 10/11/22 1529  •  ARIPiprazole (ABILIFY) tablet 10 mg, 10 mg, Oral, Daily, Romario Gutierrez DO, 10 mg at 10/12/22 1404  •  atorvastatin (LIPITOR) tablet 40 mg, 40 mg, Oral, Daily With Monie Roth MD, 40 mg at 10/11/22 1720  •  benztropine (COGENTIN) injection 1 mg, 1 mg, Intramuscular, Q4H PRN Max 6/day, Carola Skippy Kawasaki, MD  •  benztropine (COGENTIN) tablet 0 5 mg, 0 5 mg, Oral, Q4H PRN Max 6/day, Carola Skippy Kawasaki, MD  •  carvedilol (COREG) tablet 25 mg, 25 mg, Oral, BID With Meals, Stefania Michele MD, 25 mg at 10/12/22 0757  •  famotidine (PEPCID) tablet 40 mg, 40 mg, Oral, BID, Yvette Heck PA-C, 40 mg at 10/12/22 0840  •  furosemide (LASIX) tablet 20 mg, 20 mg, Oral, Daily, Marisel Chris MD, 20 mg at 10/12/22 0841  •  haloperidol lactate (HALDOL) injection 5 mg, 5 mg, Intramuscular, Q4H PRN Max 4/day, Carola Skippy Kawasaki, MD  •  hydrOXYzine HCL (ATARAX) tablet 25 mg, 25 mg, Oral, Q6H PRN Max 4/day, Marisel Chris MD, 25 mg at 10/11/22 1357  •  LORazepam (ATIVAN) injection 1 mg, 1 mg, Intramuscular, Q6H PRN Max 3/day, Marisel Chris MD  •  LORazepam (ATIVAN) tablet 0 25 mg, 0 25 mg, Oral, HS, Romario Gutierrez DO, 0 25 mg at 10/11/22 2111  •  melatonin tablet 3 mg, 3 mg, Oral, HS, Marisel Chris MD, 3 mg at 10/11/22 2111  •  pantoprazole (PROTONIX) EC tablet 40 mg, 40 mg, Oral, BID, Yvette eHck PA-C, 40 mg at 10/12/22 0840  •  polyethylene glycol (MIRALAX) packet 17 g, 17 g, Oral, Daily PRN, Marisel Chris MD, 17 g at 10/10/22 0855  •  potassium chloride (K-DUR,KLOR-CON) CR tablet 20 mEq, 20 mEq, Oral, Daily, Marisel Chris MD, 20 mEq at 10/12/22 0840  •  propranolol (INDERAL) tablet 5 mg, 5 mg, Oral, Q8H PRN, Marisel Chris MD, 5 mg at 10/10/22 1226  •  risperiDONE (RisperDAL) tablet 0 25 mg, 0 25 mg, Oral, Q4H PRN Max 6/day, Marisel Chris MD, 0 25 mg at 10/11/22 1801  •  risperiDONE (RisperDAL) tablet 0 5 mg, 0 5 mg, Oral, Q4H PRN Max 3/day, Farhan Nicholas MD  •  risperiDONE (RisperDAL) tablet 1 mg, 1 mg, Oral, Q2H PRN Max 3/day, Farhan Nicholas MD  •  senna-docusate sodium (SENOKOT S) 8 6-50 mg per tablet 1 tablet, 1 tablet, Oral, Daily PRN, Farhan Nicholas MD, 1 tablet at 10/10/22 1205  •  sertraline (ZOLOFT) tablet 200 mg, 200 mg, Oral, Daily, Phillip Horns, DO, 200 mg at 10/12/22 0840  •  traZODone (DESYREL) tablet 100 mg, 100 mg, Oral, HS, Phillip Horns, DO, 100 mg at 10/11/22 2111  •  warfarin (COUMADIN) tablet 2 mg, 2 mg, Oral, Daily (warfarin), Robin Marcial PA-C, 2 mg at 10/11/22 1719  •  [START ON 10/13/2022] warfarin (COUMADIN) tablet 4 mg, 4 mg, Oral, Daily (warfarin), Robin Marcial PA-C    PERTINENT LABS AND OTHER INVESTIGATIONS:  INR today's 3 65  Last comprehensive chemistry was from September 30, 2022, sodium was 135, potassium was 3 6, BUN 8, creatinine 0 81, LFTs were normal, bilirubin was elevated at 3 57  ECHOCARDIOGRAM AND OTHER CARDIAC TESTS RESULTS:  Echocardiogram yesterday showed moderate concentric left ventricular hypertrophy, normal left ventricular systolic function, grade 1 diastolic dysfunction, bicuspid aortic valve with mild aortic valve stenosis with peak transaortic velocity of 2 94 milliseconds, normal mitral valve, mild tricuspid valve regurgitation, no obvious pulmonary hypertension and no pericardial effusion, normal aortic root  *-*-*-*-*-*-*-*-*-*-*-*-*-*-*-*-*-*-*-*-*-*-*-*-*-*-*-*-*-*-*-*-*-*-*-*-*-*-*-*-*-*-*-*-*-*-*-*-*-*-*-*-*-*-  PLAN:  -- adding spironolactone 25 mg once daily  Will re-attempt giving amlodipine if patient is willing to take it  Will continue carvedilol  If blood pressure remains elevated can increase the carvedilol dose to 1 and half pill of 25 mg twice daily  -- request to check orthostatic vital signs tomorrow  -- requesting repeat comprehensive metabolic panel and INR tomorrow    -- evaluation for etiology of hyperbilirubinemia as when appropriate  Cardiology will follow patient intermittently  Please reach out to rounding cardiologist if there is any change in clinical status or if there are any questions or concerns  *-*-*-*-*-*-*-*-*-*-*-*-*-*-*-*-*-*-*-*-*-*-*-*-*-*-*-*-*-*-*-*-*-*-*-*-*-*-*-*-*-*-*-*-*-*-*-*-*-*-*-*-*-*-  INTERVAL CHANGES / HISTORY OF PRESENT ILLNESS:   No acute events overnight  Patient reports persisting anxiety, weakness in legs and fatigue  Denies visual symptoms or headaches  Denies orthopnea or PND  Refused amlodipine this morning  As per documentation apparently it was discontinued due to hypotension in the past     *-*-*-*-*-*-*-*-*-*-*-*-*-*-*-*-*-*-*-*-*-*-*-*-*-*-*-*-*-*-*-*-*-*-*-*-*-*-*-*-*-*-*-*-*-*-*-*-*-*-*-*-*-*    PERTINENT REVIEW OF SYMPTOMS:  As noted above    *-*-*-*-*-*-*-*-*-*-*-*-*-*-*-*-*-*-*-*-*-*-*-*-*-*-*-*-*-*-*-*-*-*-*-*-*-*-*-*-*-*-*-*-*-*-*-*-*-*-*-*-*-*-  VITAL SIGNS:  Vitals:    10/11/22 1700 10/11/22 1900 10/11/22 2040 10/12/22 0732   BP: (!) 170/102 140/82 147/88 167/92   BP Location:   Right arm Right arm   Pulse:   83 85   Resp:   18 18   Temp:   97 5 °F (36 4 °C) (!) 97 4 °F (36 3 °C)   TempSrc:   Temporal Temporal   SpO2:   95% 94%   Weight:       Height:          Temp (24hrs), Av 5 °F (36 4 °C), Min:97 4 °F (36 3 °C), Max:97 5 °F (36 4 °C)  Current: Temperature: (!) 97 4 °F (36 3 °C)    Vitals:    10/11/22 1700 10/11/22 1900 10/11/22 2040 10/12/22 0732   BP: (!) 170/102 140/82 147/88 167/92   Pulse:   83 85   Patient Position - Orthostatic VS:   Sitting Sitting     Weight    22 1825 22 1430 10/04/22 1100 10/11/22 0900   Weight: 96 2 kg (212 lb 1 3 oz) 91 6 kg (202 lb) 93 5 kg (206 lb 2 1 oz) 91 6 kg (201 lb 15 1 oz)    10/11/22 1345   Weight: 93 4 kg (206 lb)      Body mass index is 31 32 kg/m²  Body surface area is 2 07 meters squared   Wt Readings from Last 3 Encounters:   10/11/22 93 4 kg (206 lb)   22 95 7 kg (211 lb)   22 95 7 kg (211 lb) Intake/Output Summary (Last 24 hours) at 10/12/2022 1542  Last data filed at 10/12/2022 1300  Gross per 24 hour   Intake 780 ml   Output 400 ml   Net 380 ml          *-*-*-*-*-*-*-*-*-*-*-*-*-*-*-*-*-*-*-*-*-*-*-*-*-*-*-*-*-*-*-*-*-*-*-*-*-*-*-*-*-*-*-*-*-*-*-*-*-*-*-*-*-*-  PHYSICAL EXAM:  General Appearance:    Alert, cooperative, in no respiratory distress, appears stated age, slightly overweight   Head, Eyes, ENT:    No obvious abnormality, moist mucous mebranes  Neck:   Supple, no carotid bruit or JVD   Back:     Symmetric, no curvature  Lungs:     Respirations unlabored  Clear to auscultation bilaterally,    Chest wall:    No tenderness or deformity   Heart:    Regular rate and rhythm, brisk mechanical heart sounds, unable to appreciate murmur   Abdomen:     Soft, non-tender, No obvious masses, or organomegaly   Extremities:   Extremities warm, no cyanosis, trace lower extremity edema   Skin:   Skin color, texture, turgor normal, no rashes or lesions     *-*-*-*-*-*-*-*-*-*-*-*-*-*-*-*-*-*-*-*-*-*-*-*-*-*-*-*-*-*-*-*-*-*-*-*-*-*-*-*-*-*-*-*-*-*-*-*-*-*-*-*-*-*-  TELEMETRY, LAST ECG:  Telemetry reviewed    Not on telemetry  Results for orders placed or performed during the hospital encounter of 09/28/22   ECG 12 lead   Result Value    Ventricular Rate 84    Atrial Rate 84    RI Interval 150    QRSD Interval 82    QT Interval 374    QTC Interval 441    P Axis 41    QRS Axis 8    T Wave Axis 1    Narrative    Normal sinus rhythm with sinus arrhythmia  Normal ECG  When compared with ECG of 29-SEP-2022 18:30,  Criteria for an inferior wall myocardial infarction are no longer present  Confirmed by Le Hanna [id number]) on 10/10/2022 2:42:52 PM        *-*-*-*-*-*-*-*-*-*-*-*-*-*-*-*-*-*-*-*-*-*-*-*-*-*-*-*-*-*-*-*-*-*-*-*-*-*-*-*-*-*-*-*-*-*-*-*-*-*-*-*-*-*  LABORATORY DATA:  I have personally reviewed pertinent labs      CMP:      Invalid input(s): LABALBU            Cardiac Profile:       Invalid input(s): CK, CKMBP          Results from last 7 days   Lab Units 10/10/22  1404   TSH 3RD GENERATON uIU/mL 0 308*   FREE T4 ng/dL 1 40        Arterial Blood Gas Analysis:    Venous Blood Gas Analysis:       Invalid input(s): PCOVEN     CBC:     PT/INR:   Lab Results   Component Value Date    INR 3 65 (H) 10/12/2022   , Microbiology:        *-*-*-*-*-*-*-*-*-*-*-*-*-*-*-*-*-*-*-*-*-*-*-*-*-*-*-*-*-*-*-*-*-*-*-*-*-*-*-*-*-*-*-*-*-*-*-*-*-*-*-*-*-*-  IMAGING STUDIES REPORTS: Imaging studies results reviewed    No valid procedures specified  XR chest pa & lateral    Result Date: 2022  Impression No acute cardiopulmonary disease  Workstation performed: [id number]       *-*-*-*-*-*-*-*-*-*-*-*-*-*-*-*-*-*-*-*-*-*-*-*-*-*-*-*-*-*-*-*-*-*-*-*-*-*-*-*-*-*-*-*-*-*-*-*-*-*-*-*-*-*-  AVAILABLE OLD CARDIAC TESTS REPORTS:   Results for orders placed during the hospital encounter of 20    Echo complete with contrast if indicated    Narrative  [address]  [facility], [address] [facility]  [phone]    Transthoracic Echocardiogram  2D, M-mode, Doppler, and Color Doppler    Study date:  13-Mar-2020    Patient: Deborah Sidhu  MR number: [id number]  Account number: [de-identified]  : 1960  Age: 61 years  Gender: Male  Status: Inpatient  Location: Bedside  Height: 68 in  Weight: 247 5 lb  BP: 142/ 92 mmHg    Indications: TIA  Diagnoses: G45 9 - Transient cerebral ischemic attack, unspecified    Sonographer:  Milagros Hernandez  Primary Physician: Maria Ines Patricia MD  Referring Physician:  Kayla Machuca MD  Group:  [facility]  Cardiology Fellow:  Marj Hope MD  Interpreting Physician:  Rosemarie Pearce MD    SUMMARY    PROCEDURE INFORMATION:  This was a technically difficult study  LEFT VENTRICLE:  Systolic function was normal  Ejection fraction was estimated to be 60 %  There were no regional wall motion abnormalities    Wall thickness was mildly increased  The changes were consistent with concentric remodeling (increased wall thickness with normal wall mass)  Doppler parameters were consistent with abnormal left ventricular relaxation (grade 1 diastolic dysfunction)  VENTRICULAR SEPTUM:  There was mild dyssynergic motion  These changes are consistent with a post-thoracotomy state  RIGHT VENTRICLE:  The ventricle was at least mildly dilated  Wall thickness was increased  Not well visualized  TRICUSPID VALVE:  There was trace regurgitation  HISTORY: PRIOR HISTORY: HTN;A-fib;Microangiopathy;TIA; Aortic aneurysm repair;HLD;GERD; Bicuspid AV with mild AS  PROCEDURE: The procedure was performed at the bedside  This was a routine study  The transthoracic approach was used  The study included complete 2D imaging, M-mode, complete spectral Doppler, and color Doppler  The heart rate was 70 bpm,  at the start of the study  Echocardiographic views were limited due to poor acoustic window availability and decreased penetration  This was a technically difficult study  LEFT VENTRICLE: Size was normal  Systolic function was normal  Ejection fraction was estimated to be 60 %  There were no regional wall motion abnormalities  Wall thickness was mildly increased  The changes were consistent with concentric  remodeling (increased wall thickness with normal wall mass)  DOPPLER: Doppler parameters were consistent with abnormal left ventricular relaxation (grade 1 diastolic dysfunction)  VENTRICULAR SEPTUM: There was mild dyssynergic motion  These changes are consistent with a post-thoracotomy state  RIGHT VENTRICLE: The ventricle was at least mildly dilated  Systolic function was normal  Wall thickness was increased  Not well visualized  LEFT ATRIUM: Not well visualized  RIGHT ATRIUM: Not well visualized  MITRAL VALVE: Valve structure was normal  There was normal leaflet separation  DOPPLER: The transmitral velocity was within the normal range  There was no evidence for stenosis  There was no significant regurgitation  AORTIC VALVE: The valve was probably trileaflet  Leaflets exhibited sclerosis  The valve was not well visualized  DOPPLER: Transaortic velocity was minimally increased  There was no evidence for stenosis  There was no significant  regurgitation  TRICUSPID VALVE: The valve structure was normal  There was normal leaflet separation  DOPPLER: The transtricuspid velocity was within the normal range  There was no evidence for stenosis  There was trace regurgitation  Estimated peak PA  pressure was 35 mmHg  PULMONIC VALVE: Not well visualized  DOPPLER: The transpulmonic velocity was within the normal range  There was trace regurgitation  PERICARDIUM: There was no pericardial effusion  A pericardial fat pad was present  The pericardium was normal in appearance  AORTA: The root exhibited normal size  SYSTEMIC VEINS: IVC: The inferior vena cava was not well visualized  SYSTEM MEASUREMENT TABLES    2D  %FS: 28 46 %  Ao Diam: 3 24 cm  EDV(Teich): 81 53 ml  EF(Teich): 55 23 %  ESV(Teich): 36 5 ml  IVSd: 1 68 cm  LA Area: 14 03 cm2  LA Diam: 4 07 cm  LVEDV MOD A4C: 148 34 ml  LVEF MOD A4C: 51 71 %  LVESV MOD A4C: 71 63 ml  LVIDd: 4 27 cm  LVIDs: 3 05 cm  LVLd A4C: 9 36 cm  LVLs A4C: 7 45 cm  LVOT Diam: 1 83 cm  LVPWd: 1 71 cm  RA Area: 12 84 cm2  RVIDd: 2 42 cm  SV MOD A4C: 76 71 ml  SV(Teich): 45 03 ml    CW  AV Env  Ti: 272 2 ms  AV VTI: 42 59 cm  AV Vmax: 1 9 m/s  AV Vmean: 1 56 m/s  AV maxP 49 mmHg  AV meanPG: 10 37 mmHg  TR Vmax: 3 21 m/s  TR maxP 29 mmHg    MM  TAPSE: 1 09 cm    PW  KASI (VTI): 1 34 cm2  KASI Vmax: 1 17 cm2  E': 0 05 m/s  E/E': 10 53  LVOT Env  Ti: 346 02 ms  LVOT VTI: 21 77 cm  LVOT Vmax: 0 85 m/s  LVOT Vmean: 0 63 m/s  LVOT maxP 89 mmHg  LVOT meanP 73 mmHg  LVSI Dopp: 25 43 ml/m2  LVSV Dopp: 56 95 ml  MV A Qasim: 0 72 m/s  MV Dec Boundary: 1 69 m/s2  MV DecT: 303 35 ms  MV E Qasim: 0 51 m/s  MV E/A Ratio: 0 71  MV PHT: 87 97 ms  MVA By PHT: 2 5 cm2    [facility] Accredited Echocardiography Laboratory    Prepared and electronically signed by    Lalo Perez MD  Signed 13-Mar-2020 15:01:52    Results for orders placed during the hospital encounter of 21    [id number]    Narrative  [address]  [facility], [address] [facility]  [phone]    Transesophageal Echocardiogram  2D, 3D, M-mode, Doppler, and Color Doppler    Study date:  03-May-2021    Patient: Amandeep De Paz  MR number: [id number]  Account number: [de-identified]  : 1960  Age: 64 years  Gender: Male  Status: Inpatient  Location: Echo lab  Height: 68 in  Weight: 260 lb  BP: 146/ 82 mmHg    Indications: Transient ischemic attack; Aortic aneurysm    Diagnoses: G45 9 - Transient cerebral ischemic attack, unspecified    Sonographer:  Monse Simental  Interpreting Physician:  Radha Byrd MD  Primary Physician: Darvin Mares MD  Referring Physician:  Audelia Severe, MD  Group:  [facility]  Cardiology Fellow:  Griselda Mattock, MD  RN:  Shakila HeartHERON    SUMMARY    LEFT VENTRICLE:  Systolic function was normal  Ejection fraction was estimated to be 60 %  Although no diagnostic regional wall motion abnormality was identified, this possibility cannot be completely excluded on the basis of this study  Wall thickness was mildly increased  There was mild concentric hypertrophy  LEFT ATRIAL APPENDAGE:  No thrombus was identified  ATRIAL SEPTUM:  No defect or patent foramen ovale was identified  MITRAL VALVE:  There was mild regurgitation  AORTIC VALVE:  The valve was bicuspid with right and left coronary cusp fusion  Leaflets exhibited moderately increased thickness  Transaortic velocity was increased due to valvular stenosis  There was mild stenosis  Valve mean gradient was 13 mmHg  Estimated aortic valve area (by Vmax) was 1 7 cmï¾²      TRICUSPID VALVE:  There was mild regurgitation  HISTORY: PRIOR HISTORY: Aortic aneurysm repair; Hyperlipidemia; Hypertension; GERD; Atrial fibrillation; CVA; CORIE    PROCEDURE: The procedure was performed in the echo lab  This was a routine study  The risks and alternatives of the procedure were explained to the patient and informed consent was obtained  The transesophageal approach was used  The study  included complete 2D imaging, 3D imaging, M-mode, complete spectral Doppler, and color Doppler  The heart rate was 79 bpm, at the start of the study  An adult omniplane probe was inserted by the cardiology fellow under direct supervision  of the attending cardiologist  Intubated with ease  One intubation attempt(s)  There was no blood detected on the probe  Intravenous contrast ( 20cc agitated saline) was administered to evaluate intracardiac shunting  Image quality was  adequate  There were no complications during the procedure  MEDICATIONS: Anesthesia administered by anesthesia team     LEFT VENTRICLE: Size was normal  Systolic function was normal  Ejection fraction was estimated to be 60 %  Although no diagnostic regional wall motion abnormality was identified, this possibility cannot be completely excluded on the basis  of this study  Wall thickness was mildly increased  There was mild concentric hypertrophy  DOPPLER: Left ventricular diastolic function parameters were normal     RIGHT VENTRICLE: The size was normal  Systolic function was normal  Wall thickness was normal     LEFT ATRIUM: Size was normal  No thrombus was identified  APPENDAGE: The size was normal  No thrombus was identified  DOPPLER: The function was normal (normal emptying velocity)  ATRIAL SEPTUM: No defect or patent foramen ovale was identified  RIGHT ATRIUM: Size was normal  No thrombus was identified  MITRAL VALVE: Valve structure was normal  There was normal leaflet separation  DOPPLER: There was mild regurgitation      AORTIC VALVE: The valve was bicuspid with right and left coronary cusp fusion  Leaflets exhibited moderately increased thickness  DOPPLER: Transaortic velocity was increased due to valvular stenosis  There was mild stenosis  There was no  significant regurgitation  TRICUSPID VALVE: The valve structure was normal  There was normal leaflet separation  DOPPLER: There was mild regurgitation  PULMONIC VALVE: DOPPLER: There was no significant regurgitation  PERICARDIUM: There was no pericardial effusion  The pericardium was normal in appearance  AORTA: The root exhibited normal size  There was no atheroma  There was no evidence for dissection  There was no evidence for aneurysm  MEASUREMENT TABLES    2D MEASUREMENTS  LVOT   (Reference normals)  Diam   23 mm   (--)  Aortic valve   (Reference normals)  Area sys, plan   1 8 cmï¾²   (--)    DOPPLER MEASUREMENTS  LVOT   (Reference normals)  Peak sonia   99 cm/s   (--)  Mean sonia   60 cm/s   (--)  VTI   21 cm   (--)  Peak gradient   4 mmHg   (--)  Mean gradient   1 4 mmHg   (--)  Stroke vol   87 25 ml   (--)  Aortic valve   (Reference normals)  Peak sonia   243 cm/s   (--)  Mean sonia   178 cm/s   (--)  VTI   52 cm   (--)  Peak gradient   23 62 mmHg   (--)  Mean gradient   13 mmHg   (--)  Obstr index, VTI   0 4    (--)  Valve area, VTI   1 66 cmï¾²   (--)  Area index, VTI   0 72 cmï¾²/mï¾²   (--)  Obstr index, Vmax   0 41    (--)  Valve area, Vmax   1 7 cmï¾²   (--)  Area index, Vmax   0 74 cmï¾²/mï¾²   (--)  Obstr index, Vmean   0 34    (--)  Valve area, Vmean   1 41 cmï¾²   (--)  Area index, Vmean   0 62 cmï¾²/mï¾²   (--)    [facility] Commission Accredited Echocardiography Laboratory    Prepared and electronically signed by    Alex Howard MD  Signed  15:49:23    No results found for this or any previous visit  No results found for this or any previous visit  *-*-*-*-*-*-*-*-*-*-*-*-*-*-*-*-*-*-*-*-*-*-*-*-*-*-*-*-*-*-*-*-*-*-*-*-*-*-*-*-*-*-*-*-*-*-*-*-*-*-*-*-*-*-  SIGNATURES:   @DWS@   [facility]

## 2022-10-12 NOTE — CASE MANAGEMENT
10/12/22 0851   Team Meeting   Meeting Type Daily Rounds   Initial Conference Date 10/12/22   Team Members Present   Team Members Present Physician;Nurse;   Physician Team Member Dr Farheen Sheikh; Dr Rosa Robledo Team Member [facility] Team Member Naya   Patient/Family Present   Patient Present No   Patient's Family Present No     Anxious, preoccupied with bowels and urination, med compliant, slept, 7/10 generalized pain, 6/10 depression, 3/4 anxiety, visible, shouting out at times, received PRN Risperdal last night PAST SURGICAL HISTORY:  History of endoscopy upper    S/P laparoscopic cholecystectomy 1/2021

## 2022-10-12 NOTE — TREATMENT TEAM
10/11/22 1720   Provider Notification   Provider Name [facility]   Provider Role Hospitalist   Method of Communication Other (Comment)  [facility] Text)   Response No new orders   Notification Time [phone]) [phone]   Patient blood pressure 170/102 manually, provider made aware  Requesting this be rechecked at 1900

## 2022-10-12 NOTE — NURSING NOTE
Patient visible on the unit throughout the shift sitting with peers in the dayroom, social with staff and peers on approach  Patient cooperative with assessment questions, reporting 6/10 depression and 3/4 anxiety, denies SI/HI/AVH  Patient compliant with medications and meals, appetite fair  No further signs of distress noted  VSS

## 2022-10-12 NOTE — PROGRESS NOTES
10/10/22 1000   Activity/Group Checklist   Group Other (Comment)  (Greet the day group-"[employer]" [employer]ary [employer]ane)   Attendance Attended   Attendance Duration (min) 16-30   Interactions Interacted appropriately   Affect/Mood Appropriate   Goals Achieved Identified feelings; Identified triggers; Able to listen to others; Able to engage in interactions; Able to manage/cope with feelings; Able to recieve feedback; Able to self-disclose; Able to give feedback to another

## 2022-10-12 NOTE — PLAN OF CARE
Problem: Ineffective Coping  Goal: Identifies ineffective coping skills  Outcome: Progressing  Goal: Identifies healthy coping skills  Outcome: Progressing  Goal: Demonstrates healthy coping skills  Outcome: Progressing  Goal: Patient/Family participate in treatment and DC plans  Description: Interventions:  - Provide therapeutic environment  Outcome: Progressing  Goal: Patient/Family verbalizes awareness of resources  Outcome: Progressing  Goal: Understands least restrictive measures  Description: Interventions:  - Utilize least restrictive behavior  Outcome: Progressing  Goal: Free from restraint events  Description: - Utilize least restrictive measures   - Provide behavioral interventions   - Redirect inappropriate behaviors   Outcome: Progressing     Problem: Anxiety  Goal: Anxiety is at manageable level  Description: Interventions:  - Assess and monitor patient's anxiety level  - Monitor for signs and symptoms (heart palpitations, chest pain, shortness of breath, headaches, nausea, feeling jumpy, restlessness, irritable, apprehensive)  - Collaborate with interdisciplinary team and initiate plan and interventions as ordered  - [place] patient to unit/surroundings  - Explain treatment plan  - Encourage participation in care  - Encourage verbalization of concerns/fears  - Identify coping mechanisms  - Assist in developing anxiety-reducing skills  - Administer/offer alternative therapies  - Limit or eliminate stimulants  Outcome: Progressing     Problem: Nutrition/Hydration-ADULT  Goal: Nutrient/Hydration intake appropriate for improving, restoring or maintaining nutritional needs  Description: Monitor and assess patient's nutrition/hydration status for malnutrition  Collaborate with interdisciplinary team and initiate plan and interventions as ordered  Monitor patient's weight and dietary intake as ordered or per policy  Utilize nutrition screening tool and intervene as necessary   Determine patient's food preferences and provide high-protein, high-caloric foods as appropriate       INTERVENTIONS:  - Monitor oral intake, urinary output, labs, and treatment plans  - Assess nutrition and hydration status and recommend course of action  - Evaluate amount of meals eaten  - Assist patient with eating if necessary   - Allow adequate time for meals  - Recommend/ encourage appropriate diets, oral nutritional supplements, and vitamin/mineral supplements  - Order, calculate, and assess calorie counts as needed  - Recommend, monitor, and adjust tube feedings and TPN/PPN based on assessed needs  - Assess need for intravenous fluids  - Provide specific nutrition/hydration education as appropriate  - Include patient/family/caregiver in decisions related to nutrition  Outcome: Progressing

## 2022-10-12 NOTE — PROGRESS NOTES
Progress Note - [address] Moses 58 y o  male MRN: [id number]  Unit/Bed#: [facility] 243-03 Encounter: [id number]    Assessment/Plan   Principal Problem:    SHIMON (generalized anxiety disorder)  Active Problems:    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 attack    Hypertension    GERD (gastroesophageal reflux disease)    Atrial fibrillation (HCC)    Class 2 obesity in adult    History of stroke    Antiphospholipid antibody with hypercoagulable state ([facility] [address] )    Hyperbilirubinemia    CORIE (obstructive sleep apnea)    Occasional tremors    PVD (peripheral vascular disease) (HCC)    Medical clearance for incarceration    Mild protein-calorie malnutrition (HCC)    Sinus arrhythmia    Supratherapeutic INR      Recommended Treatment:   No psychopharmacologic changes necessary at this moment with the exception of discontinue mirtazapine due to polypharmacy; will continue to assess daily for further optimization  Continue with pharmacotherapy, group therapy, milieu therapy and occupational therapy  Continue to assess for adverse medication side effects  Encourage Hans Miranda to participate in nonverbal forms of therapy including journaling and art/music therapy  Continue frequent safety checks and vitals per unit protocol  Continue to engage CM/SW to assist with collateral, disposition planning, and the implementation of an individualized, patient-centered plan of care  Continue medical management by medical team   Case discussed with treatment team     Legal Status: 201  ------------------------------------------------------------    Subjective: All documentation including nursing notes, medication history to ensure medication adherence on the unit, labs, and vitals were reviewed  Dae Curry was evaluated this morning for continuity of care and no acute distress noted throughout the evaluation   Over the past 24 hours per nursing report, Dae Curry has been cooperative on the unit and compliant with medications  Today, Hardeep Fung is consenting for safety on the unit  Hardeep Fung reports feeling "weak and anxious " Hardeep Fung notes having decent sleep  Hardeep Fung states having a “light” appetite  Hardeep Fung has been taking the medications as prescribed and reporting side effects to nonspecific and not related to the medications that the patient is taking  Patient refused Abilify this morning because he thought that it was making him walk differently  Discussed with patient and reason with and that he is walking the same and has not taken his Abilify this morning  Patient was occur encouraged take both his Abilify and his amlodipine  Patient thought that he had been discontinued off of amlodipine  Patient was reminded that he has been on amlodipine during this entire hospital stay  Patient clarified that he did not ask anybody to watch him go to the bathroom however he did feel that he is was a fall risk and wanted somebody there just in case he needed help getting up from the toilet  Patient has a new problem now that he is using a walker stating that he has a “water situation” in which case he is not able to carry his water and walk at the same time because he is utilizing both of his hands to manage the walker  Patient is not sure what he is going to do because he is “not going to have you as my water boy"  Provided support  Hardeep Fung denies suicidal ideations  Edmundoavelino Fung denies homicidal ideations   Regarding hallucinations, Hardeep Fung denies and does not appear to be internally stimulated    PRNs overnight: Risperidone 0 25mg at 1800    VS: Reviewed, clinically obese, otherwise within normal limits    Progress Toward Goals: slow improvement    Psychiatric Review of Systems:  Behavior over the last 24 hours:  improved  Sleep: normal  Appetite: poor  Medication side effects: No   ROS: all other systems are negative    Vital signs in last 24 hours:  Temp:  [97 4 °F (36 3 °C)-98 2 °F (36 8 °C)] 97 4 °F (36 3 °C)  HR:  [81-96] 85  Resp:  [18] 18  BP: (140-176)/() 167/92    Laboratory results:  I have personally reviewed all pertinent laboratory/tests results    Recent Results (from the past 48 hour(s))   ECG 12 lead    Collection Time: 10/10/22  1:38 PM   Result Value Ref Range    Ventricular Rate 84 BPM    Atrial Rate 84 BPM    MD Interval 150 ms    QRSD Interval 82 ms    QT Interval 374 ms    QTC Interval 441 ms    P Axis 41 degrees    QRS Axis 8 degrees    T Wave Axis 1 degrees   TSH, 3rd generation with Free T4 reflex    Collection Time: 10/10/22  2:04 PM   Result Value Ref Range    TSH 3RD GENERATON 0 308 (L) 0 450 - 4 500 uIU/mL   T4, free    Collection Time: 10/10/22  2:04 PM   Result Value Ref Range    Free T4 1 40 0 76 - 1 46 ng/dL   Protime-INR    Collection Time: 10/11/22  6:54 AM   Result Value Ref Range    Protime 36 3 (H) 11 6 - 14 5 seconds    INR 3 58 (H) 0 84 - 1 19   Lactate dehydrogenase    Collection Time: 10/11/22  6:54 AM   Result Value Ref Range     313 - 618 U/L   Retic Count    Collection Time: 10/11/22  6:54 AM   Result Value Ref Range    Retic Ct Abs 68,900 14,356 - 105,094    Retic Ct Pct 1 45 0 37 - 1 87 %   Echo complete w/ contrast if indicated    Collection Time: 10/11/22  1:55 PM   Result Value Ref Range    AV area peak qasim 1 0 cm2    LA size 4 3 cm    Aortic valve mean velocity 20 70 m/s    Triscuspid Valve Regurgitation Peak Gradient 35 0 mmHg    Tricuspid valve peak regurgitation velocity 2 96 m/s    LVPWd 1 40 cm    Left Atrium Area-systolic Apical Two Chamber 16 8 cm2    Left Atrium Area-systolic Four Chamber 05 8 cm2    MV E' Tissue Velocity Septal 7 cm/s    TR Peak Qasim 3 0 m/s    IVSd 4 49 cm    LV DIASTOLIC VOLUME (MOD BIPLANE) 2D 85 mL    LEFT VENTRICLE SYSTOLIC VOLUME (MOD BIPLANE) 2D 29 mL    Left ventricular stroke volume (2D) 57 00 mL    A4C EF 63 %    LA length (A2C) 4 60 cm    LVIDd 4 40 cm    IVS 1 5 cm    LVIDS 2 80 cm    FS 36 28 - 44 %    Ao root 3 70 cm    RVID d 3 9 cm LVOT mn grad 2 0 mmHg    AV area by cont VTI 0 9 cm2    AV mean gradient 20 mmHg    AV LVOT peak gradient 4 mmHg    MV valve area p 1/2 method 3 24 cm2    E wave deceleration time 234 ms    LVOT diameter 2 0 cm    LVOT peak qasim 0 95 m/s    LVOT peak VTI 17 09 cm    Aortic valve peak velocity 2 94 m/s    Ao VTI 57 31 cm    LVOT stroke volume 53 66 cm3    AV peak gradient 35 mmHg    MV Peak E Qasim 74 cm/s    MV Peak A Qasim 0 73 m/s    RAA A4C 15 9 cm2    MV stenosis pressure 1/2 time 68 ms    LVOT stroke volume index 27 30 ml/m2    LVSV, 2D 57 mL    LVOT area 3 14 cm2    Dimensionless velociy index 0 32     AV valve area 0 94 cm2    LV EF 63    Protime-INR    Collection Time: 10/12/22  5:52 AM   Result Value Ref Range    Protime 36 9 (H) 11 6 - 14 5 seconds    INR 3 65 (H) 0 84 - 1 19         Mental Status Evaluation:    Appearance:  casually dressed, marginal hygiene, looks stated age, overweight, Overtly appearing  male, in no apparent acute distress, good eye contact   Behavior:  cooperative   Speech:  normal rate and volume, increased stuttering today   Mood:  "We can anxious"   Affect:  constricted   Thought Process:  perseverative, negative thinking   Associations: concrete associations   Thought Content:  mild paranoia, obsessions, ruminations   Perceptual Disturbances: Denies auditory or visual hallucinations and Does not appear to be responding to internal stimuli   Risk Potential: Suicidal ideation - None at present  Homicidal ideation - None at present  Potential for aggression - Not at present   Sensorium:  oriented to person, place and time/date   Memory:  recent and remote memory grossly intact   Consciousness:  alert and awake   Attention/Concentration: attention span and concentration are age appropriate   Insight:  limited   Judgment: limited   Gait/Station: slow gait, uses walker   Motor Activity: no abnormal movements       Current Medications:  Current Facility-Administered Medications Medication Dose Route Frequency Provider Last Rate   • acetaminophen  650 mg Oral Q4H PRN Katina Jean MD     • acetaminophen  650 mg Oral Q4H PRN Katina Jean MD     • acetaminophen  975 mg Oral Q6H PRN Katina Jean MD     • aluminum-magnesium hydroxide-simethicone  30 mL Oral Q4H PRN Katina Jean MD     • amLODIPine  5 mg Oral Daily Danae Easton, DO     • ARIPiprazole  10 mg Oral Daily Santiago Vieira DO     • atorvastatin  40 mg Oral Daily With Dinner Katina Jean MD     • benztropine  1 mg Intramuscular Q4H PRN Max 6/day Katina Jean MD     • benztropine  0 5 mg Oral Q4H PRN Max 6/day Laura Roth MD     • carvedilol  25 mg Oral BID With Meals Heriberto Hearn MD     • famotidine  40 mg Oral BID Maeve Torres PA-C     • furosemide  20 mg Oral Daily Katina Jean MD     • haloperidol lactate  5 mg Intramuscular Q4H PRN Max 4/day Katina Jean MD     • hydrOXYzine HCL  25 mg Oral Q6H PRN Max 4/day Katina Jean MD     • LORazepam  1 mg Intramuscular Q6H PRN Max 3/day Katina Jean MD     • LORazepam  0 25 mg Oral HS Santiago Vieira DO     • melatonin  3 mg Oral HS Katina Jean MD     • mirtazapine  7 5 mg Oral HS Santiago Vieira DO     • pantoprazole  40 mg Oral BID Maeve Torres PA-C     • polyethylene glycol  17 g Oral Daily PRN Katina Jean MD     • potassium chloride  20 mEq Oral Daily Katina Jean MD     • propranolol  5 mg Oral Q8H PRN Katina Jean MD     • risperiDONE  0 25 mg Oral Q4H PRN Max 6/day Laura Roth MD     • risperiDONE  0 5 mg Oral Q4H PRN Max 3/day Katina Jean MD     • risperiDONE  1 mg Oral Q2H PRN Max 3/day Katina Jean MD     • senna-docusate sodium  1 tablet Oral Daily PRN Katina Jean MD     • sertraline  200 mg Oral Daily Santiago Vieira DO     • traZODone  100 mg Oral HS Santiago Vieira DO     • warfarin  2 mg Oral Daily (warfarin) Maeve Torres PA-C     • [START ON 10/13/2022] warfarin  4 mg Oral Daily (warfarin) Duncan Lizama PA-C         Suicide/Homicide Risk Assessment:  Risk of Harm to Self:   Based on today's assessment, Claudette Jones presents the following risk of harm to self: minimal    Risk of Harm to Others:  Based on today's assessment, Claudette Jones presents the following risk of harm to others: minimal    The following interventions are recommended: behavioral checks every 7 minutes, continued hospitalization on locked unit    Behavioral Health Medications: All current active meds have been reviewed  Changes as in plan section above  Risks, benefits and possible side effects of Medications:   Risks, benefits, and possible side effects of medications explained to patient and patient verbalizes understanding  Counseling / Coordination of Care:  Patient's progress discussed with staff in treatment team meeting  Medications, treatment progress and treatment plan reviewed with patient  Nhi Aldridges 10/12/22  Psychiatry Resident, PGY-II    This note was completed in part utilizing [employer] Direct Software  Grammatical, translation, syntax errors, random word insertions, spelling mistakes, and incomplete sentences may be an occasional consequence of this system secondary to software limitations with voice recognition, ambient noise, and hardware issues  If you have any questions or concerns about the content, text, or information contained within the body of this dictation, please contact the provider for clarification

## 2022-10-13 LAB
ALBUMIN SERPL BCP-MCNC: 3.5 G/DL (ref 3.5–5)
ALP SERPL-CCNC: 83 U/L (ref 43–122)
ALT SERPL W P-5'-P-CCNC: 26 U/L
ANION GAP SERPL CALCULATED.3IONS-SCNC: 6 MMOL/L (ref 5–14)
ANION GAP SERPL CALCULATED.3IONS-SCNC: 6 MMOL/L (ref 5–14)
AST SERPL W P-5'-P-CCNC: 27 U/L (ref 17–59)
BILIRUB DIRECT SERPL-MCNC: 0 MG/DL (ref 0–0.2)
BILIRUB SERPL-MCNC: 2.09 MG/DL (ref 0.2–1)
BUN SERPL-MCNC: 13 MG/DL (ref 5–25)
BUN SERPL-MCNC: 15 MG/DL (ref 5–25)
CALCIUM SERPL-MCNC: 8.7 MG/DL (ref 8.4–10.2)
CALCIUM SERPL-MCNC: 8.8 MG/DL (ref 8.4–10.2)
CHLORIDE SERPL-SCNC: 91 MMOL/L (ref 96–108)
CHLORIDE SERPL-SCNC: 92 MMOL/L (ref 96–108)
CO2 SERPL-SCNC: 29 MMOL/L (ref 21–32)
CO2 SERPL-SCNC: 29 MMOL/L (ref 21–32)
CREAT SERPL-MCNC: 0.92 MG/DL (ref 0.7–1.5)
CREAT SERPL-MCNC: 0.97 MG/DL (ref 0.7–1.5)
GFR SERPL CREATININE-BSD FRML MDRD: 83 ML/MIN/1.73SQ M
GFR SERPL CREATININE-BSD FRML MDRD: 88 ML/MIN/1.73SQ M
GLUCOSE P FAST SERPL-MCNC: 99 MG/DL (ref 70–99)
GLUCOSE SERPL-MCNC: 116 MG/DL (ref 70–99)
GLUCOSE SERPL-MCNC: 99 MG/DL (ref 70–99)
INR PPP: 3.2 (ref 0.84–1.19)
OSMOLALITY UR: 333 MMOL/KG
POTASSIUM SERPL-SCNC: 3.6 MMOL/L (ref 3.5–5.3)
POTASSIUM SERPL-SCNC: 4 MMOL/L (ref 3.5–5.3)
PROT SERPL-MCNC: 6 G/DL (ref 6.4–8.4)
PROTHROMBIN TIME: 33.3 SECONDS (ref 11.6–14.5)
SODIUM 24H UR-SCNC: 32 MOL/L
SODIUM SERPL-SCNC: 126 MMOL/L (ref 135–147)
SODIUM SERPL-SCNC: 127 MMOL/L (ref 135–147)
URATE SERPL-MCNC: 5.3 MG/DL (ref 3.5–8.5)

## 2022-10-13 PROCEDURE — 82248 BILIRUBIN DIRECT: CPT | Performed by: PHYSICIAN ASSISTANT

## 2022-10-13 PROCEDURE — 83935 ASSAY OF URINE OSMOLALITY: CPT | Performed by: PHYSICIAN ASSISTANT

## 2022-10-13 PROCEDURE — 84300 ASSAY OF URINE SODIUM: CPT | Performed by: PHYSICIAN ASSISTANT

## 2022-10-13 PROCEDURE — 97116 GAIT TRAINING THERAPY: CPT

## 2022-10-13 PROCEDURE — 84550 ASSAY OF BLOOD/URIC ACID: CPT | Performed by: PHYSICIAN ASSISTANT

## 2022-10-13 PROCEDURE — 85610 PROTHROMBIN TIME: CPT | Performed by: PHYSICIAN ASSISTANT

## 2022-10-13 PROCEDURE — 80053 COMPREHEN METABOLIC PANEL: CPT | Performed by: INTERNAL MEDICINE

## 2022-10-13 PROCEDURE — 99232 SBSQ HOSP IP/OBS MODERATE 35: CPT | Performed by: STUDENT IN AN ORGANIZED HEALTH CARE EDUCATION/TRAINING PROGRAM

## 2022-10-13 PROCEDURE — 80048 BASIC METABOLIC PNL TOTAL CA: CPT

## 2022-10-13 PROCEDURE — 97163 PT EVAL HIGH COMPLEX 45 MIN: CPT

## 2022-10-13 RX ORDER — WARFARIN SODIUM 2 MG/1
2 TABLET ORAL
Status: DISCONTINUED | OUTPATIENT
Start: 2022-10-13 | End: 2022-10-17

## 2022-10-13 RX ORDER — AMLODIPINE BESYLATE 5 MG/1
5 TABLET ORAL DAILY
Status: DISCONTINUED | OUTPATIENT
Start: 2022-10-14 | End: 2022-10-26 | Stop reason: HOSPADM

## 2022-10-13 RX ORDER — CARVEDILOL 12.5 MG/1
37.5 TABLET ORAL 2 TIMES DAILY WITH MEALS
Status: DISCONTINUED | OUTPATIENT
Start: 2022-10-13 | End: 2022-10-26 | Stop reason: HOSPADM

## 2022-10-13 RX ADMIN — FUROSEMIDE 20 MG: 40 TABLET ORAL at 08:16

## 2022-10-13 RX ADMIN — TRAZODONE HYDROCHLORIDE 100 MG: 100 TABLET ORAL at 21:12

## 2022-10-13 RX ADMIN — AMLODIPINE BESYLATE 5 MG: 5 TABLET ORAL at 08:16

## 2022-10-13 RX ADMIN — FAMOTIDINE 40 MG: 20 TABLET ORAL at 17:00

## 2022-10-13 RX ADMIN — CARVEDILOL 25 MG: 12.5 TABLET, FILM COATED ORAL at 08:16

## 2022-10-13 RX ADMIN — POTASSIUM CHLORIDE 20 MEQ: 1500 TABLET, EXTENDED RELEASE ORAL at 08:50

## 2022-10-13 RX ADMIN — MELATONIN TAB 3 MG 3 MG: 3 TAB at 21:12

## 2022-10-13 RX ADMIN — ARIPIPRAZOLE 10 MG: 10 TABLET ORAL at 08:50

## 2022-10-13 RX ADMIN — FAMOTIDINE 40 MG: 20 TABLET ORAL at 08:16

## 2022-10-13 RX ADMIN — SPIRONOLACTONE 25 MG: 25 TABLET, FILM COATED ORAL at 08:50

## 2022-10-13 RX ADMIN — SERTRALINE HYDROCHLORIDE 200 MG: 100 TABLET ORAL at 08:49

## 2022-10-13 RX ADMIN — PANTOPRAZOLE SODIUM 40 MG: 40 TABLET, DELAYED RELEASE ORAL at 21:12

## 2022-10-13 RX ADMIN — LORAZEPAM 0.25 MG: 0.5 TABLET ORAL at 21:12

## 2022-10-13 RX ADMIN — PANTOPRAZOLE SODIUM 40 MG: 40 TABLET, DELAYED RELEASE ORAL at 08:50

## 2022-10-13 RX ADMIN — WARFARIN SODIUM 2 MG: 2 TABLET ORAL at 17:00

## 2022-10-13 RX ADMIN — CARVEDILOL 37.5 MG: 12.5 TABLET, FILM COATED ORAL at 15:54

## 2022-10-13 RX ADMIN — HYDROXYZINE HYDROCHLORIDE 25 MG: 25 TABLET, FILM COATED ORAL at 15:55

## 2022-10-13 RX ADMIN — ATORVASTATIN CALCIUM 40 MG: 40 TABLET, FILM COATED ORAL at 15:54

## 2022-10-13 NOTE — PROGRESS NOTES
Progress Note - 2625 Neha Moses 58 y o  male MRN: [id number]  Unit/Bed#: [facility] Ellison 003-52 Encounter: [id number]    Assessment/Plan   Principal Problem:    SHIMON (generalized anxiety disorder)  Active Problems:    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 attack    Hypertension    GERD (gastroesophageal reflux disease)    Atrial fibrillation (HCC)    Class 2 obesity in adult    History of stroke    Antiphospholipid antibody with hypercoagulable state ([address] )    Hyperbilirubinemia    CORIE (obstructive sleep apnea)    Occasional tremors    PVD (peripheral vascular disease) (HCC)    Medical clearance for incarceration    Mild protein-calorie malnutrition (HCC)    Sinus arrhythmia    Supratherapeutic INR      Recommended Treatment:   No psychopharmacologic changes necessary at this moment with the exception of holding zoloft due to repeat sodium of 126 this evening, will repeat sodium in AM; will continue to assess daily for further optimization  PT order placed to evaluate subjective weakness in legs  Cardiology following, appreciate recs  Endocrinology on consult, appreciate recs  Medicine following closely  Rechecking sodium levels due to sodium level of 127, --> is 126, DC zoloft and tomorrow: repeat sodium and consider cross titrate to a medication that will not cause hyponatremia, considering MAOI, mirtazapine, trazodone  Peer to peer in AM    Continue with pharmacotherapy, group therapy, milieu therapy and occupational therapy  Continue to assess for adverse medication side effects  Encourage Hans Miranda to participate in nonverbal forms of therapy including journaling and art/music therapy  Continue frequent safety checks and vitals per unit protocol  Continue to engage CM/SW to assist with collateral, disposition planning, and the implementation of an individualized, patient-centered plan of care    Continue medical management by medical team   Case discussed with treatment team     Legal Status: 201  ------------------------------------------------------------    Subjective: All documentation including nursing notes, medication history to ensure medication adherence on the unit, labs, and vitals were reviewed  Lorena Garcia was evaluated this morning for continuity of care and no acute distress noted throughout the evaluation  Over the past 24 hours per nursing report, Lorena Garcia has been somatic insisting that he cannot walk, cooperative on the unit and compliant with medications  Today, Lorena Garcia is consenting for safety on the unit  Lorena Garcia reports feeling "okay " Lorena Garcia notes having decent sleep  Lorena Garcia states having a “light” appetite  Lorena Garcia has been taking the medications as prescribed and reporting somatic side effects  Patient was seen and examined today  We discussed his worries about urination, his worries about not having the walker and needing a PT eval, performed brief cerebellar assessment on patient  WNL  Lorena Garcia denies suicidal ideations  Lorena Garcia denies homicidal ideations  Regarding hallucinations, Lorena Garcia denies and does not appear to be internally stimulated  PRNs overnight:  None   VS: Reviewed, Clinically obese, otherwise within normal limits    Progress Toward Goals: slow improvement    Psychiatric Review of Systems:  Behavior over the last 24 hours:  improved  Sleep: normal  Appetite: decreased  Medication side effects: No   ROS: all other systems are negative    Vital signs in last 24 hours:  Temp:  [97 5 °F (36 4 °C)-97 9 °F (36 6 °C)] 97 8 °F (36 6 °C)  HR:  [71-90] 88  Resp:  [16-18] 16  BP: (167-206)/() 189/100    Laboratory results:  I have personally reviewed all pertinent laboratory/tests results    Recent Results (from the past 48 hour(s))   Echo complete w/ contrast if indicated    Collection Time: 10/11/22  1:55 PM   Result Value Ref Range    AV area peak sonia 1 0 cm2    LA size 4 3 cm    Aortic valve mean velocity 20 70 m/s    Triscuspid Valve Regurgitation Peak Gradient 35 0 mmHg    Tricuspid valve peak regurgitation velocity 2 96 m/s    LVPWd 1 40 cm    Left Atrium Area-systolic Apical Two Chamber 16 8 cm2    Left Atrium Area-systolic Four Chamber 88 8 cm2    MV E' Tissue Velocity Septal 7 cm/s    TR Peak Qasim 3 0 m/s    IVSd 8 89 cm    LV DIASTOLIC VOLUME (MOD BIPLANE) 2D 85 mL    LEFT VENTRICLE SYSTOLIC VOLUME (MOD BIPLANE) 2D 29 mL    Left ventricular stroke volume (2D) 57 00 mL    A4C EF 63 %    LA length (A2C) 4 60 cm    LVIDd 4 40 cm    IVS 1 5 cm    LVIDS 2 80 cm    FS 36 28 - 44 %    Ao root 3 70 cm    RVID d 3 9 cm    LVOT mn grad 2 0 mmHg    AV area by cont VTI 0 9 cm2    AV mean gradient 20 mmHg    AV LVOT peak gradient 4 mmHg    MV valve area p 1/2 method 3 24 cm2    E wave deceleration time 234 ms    LVOT diameter 2 0 cm    LVOT peak qasim 0 95 m/s    LVOT peak VTI 17 09 cm    Aortic valve peak velocity 2 94 m/s    Ao VTI 57 31 cm    LVOT stroke volume 53 66 cm3    AV peak gradient 35 mmHg    MV Peak E Qasim 74 cm/s    MV Peak A Qasim 0 73 m/s    RAA A4C 15 9 cm2    MV stenosis pressure 1/2 time 68 ms    LVOT stroke volume index 27 30 ml/m2    LVSV, 2D 57 mL    LVOT area 3 14 cm2    Dimensionless velociy index 0 32     AV valve area 0 94 cm2    LV EF 63    Protime-INR    Collection Time: 10/12/22  5:52 AM   Result Value Ref Range    Protime 36 9 (H) 11 6 - 14 5 seconds    INR 3 65 (H) 0 84 - 1 19   Bilirubin, direct    Collection Time: 10/13/22  6:53 AM   Result Value Ref Range    Bilirubin, Direct 0 00 0 00 - 0 20 mg/dL   Protime-INR    Collection Time: 10/13/22  6:53 AM   Result Value Ref Range    Protime 33 3 (H) 11 6 - 14 5 seconds    INR 3 20 (H) 0 84 - 1 19   Comprehensive metabolic panel    Collection Time: 10/13/22  6:57 AM   Result Value Ref Range    Sodium 127 (L) 135 - 147 mmol/L    Potassium 3 6 3 5 - 5 3 mmol/L    Chloride 92 (L) 96 - 108 mmol/L    CO2 29 21 - 32 mmol/L    ANION GAP 6 5 - 14 mmol/L    BUN 13 5 - 25 mg/dL    Creatinine 0 92 0 70 - 1 50 mg/dL    Glucose 99 70 - 99 mg/dL    Glucose, Fasting 99 70 - 99 mg/dL    Calcium 8 7 8 4 - 10 2 mg/dL    AST 27 17 - 59 U/L    ALT 26 <50 U/L    Alkaline Phosphatase 83 43 - 122 U/L    Total Protein 6 0 (L) 6 4 - 8 4 g/dL    Albumin 3 5 3 5 - 5 0 g/dL    Total Bilirubin 2 09 (H) 0 20 - 1 00 mg/dL    eGFR 88 ml/min/1 73sq m         Mental Status Evaluation:    Appearance:  casually dressed, marginal hygiene, looks stated age, overweight, Overtly appearing  male, in no apparent acute distress, good eye contact   Behavior:  cooperative   Speech:  normal rate and volume no stuttering today   Mood:  "Okay"   Affect:  constricted   Thought Process:  perseverative, negative thinking   Associations: perseveration   Thought Content:  mild paranoia, negative thinking, ruminations   Perceptual Disturbances: Denies auditory or visual hallucinations and Does not appear to be responding to internal stimuli   Risk Potential: Suicidal ideation - None at present  Homicidal ideation - None at present  Potential for aggression - Not at present   Sensorium:  oriented to person, place and time/date   Memory:  recent and remote memory grossly intact   Consciousness:  alert and awake   Attention/Concentration: attention span and concentration are age appropriate   Insight:  limited   Judgment: limited   Gait/Station: slow gait, uses walker, [place] steps with walker   Motor Activity: no abnormal movements       Current Medications:  Current Facility-Administered Medications   Medication Dose Route Frequency Provider Last Rate   • acetaminophen  650 mg Oral Q4H PRN Evelin Perkins MD     • acetaminophen  650 mg Oral Q4H PRN Evelin Perkins MD     • acetaminophen  975 mg Oral Q6H PRN Evelin Perkins MD     • aluminum-magnesium hydroxide-simethicone  30 mL Oral Q4H PRN Evelin Perkins MD     • amLODIPine  5 mg Oral Daily Danae Easton, DO     • ARIPiprazole  10 mg Oral Daily Mendel Grates, DO     • atorvastatin  40 mg Oral Daily With Sachi Mendoza MD     • benztropine  1 mg Intramuscular Q4H PRN Max 6/day Swapna Burk MD     • benztropine  0 5 mg Oral Q4H PRN Max 6/day Swapna Burk MD     • carvedilol  25 mg Oral BID With Meals Nevaeh Rhodes MD     • famotidine  40 mg Oral BID Tomas Sánchez PA-C     • furosemide  20 mg Oral Daily Swapna Burk MD     • haloperidol lactate  5 mg Intramuscular Q4H PRN Max 4/day Swapna Burk MD     • hydrOXYzine HCL  25 mg Oral Q6H PRN Max 4/day Swapna Burk MD     • LORazepam  1 mg Intramuscular Q6H PRN Max 3/day Swapna Burk MD     • LORazepam  0 25 mg Oral HS Rachel Plan, DO     • melatonin  3 mg Oral HS Swapna Burk MD     • pantoprazole  40 mg Oral BID Tomas Sánchez PA-C     • polyethylene glycol  17 g Oral Daily PRN Swapna Burk MD     • potassium chloride  20 mEq Oral Daily Swapna Burk MD     • propranolol  5 mg Oral Q8H PRN Swapna Burk MD     • risperiDONE  0 25 mg Oral Q4H PRN Max 6/day Laura Morgan MD     • risperiDONE  0 5 mg Oral Q4H PRN Max 3/day Swapna Burk MD     • risperiDONE  1 mg Oral Q2H PRN Max 3/day Swapna Burk MD     • senna-docusate sodium  1 tablet Oral Daily PRN Swapna Burk MD     • sertraline  200 mg Oral Daily Rachel Plan, DO     • spironolactone  25 mg Oral Daily Remedios March MD     • traZODone  100 mg Oral HS Rachel Plan, DO     • warfarin  4 mg Oral Daily (warfarin) Tomas Sánchez PA-C         Suicide/Homicide Risk Assessment:  Risk of Harm to Self:   Based on today's assessment, Brandie Jacobs presents the following risk of harm to self: minimal    Risk of Harm to Others:  Based on today's assessment, Brandie Jacobs presents the following risk of harm to others: minimal    The following interventions are recommended: behavioral checks every 7 minutes, continued hospitalization on locked unit    Behavioral Health Medications: All current active meds have been reviewed   Changes as in plan section above  Risks, benefits and possible side effects of Medications:   Risks, benefits, and possible side effects of medications explained to patient and patient verbalizes understanding  Counseling / Coordination of Care:  Patient's progress discussed with staff in treatment team meeting  Medications, treatment progress and treatment plan reviewed with patient  Inessa Reid 10/13/22  Psychiatry Resident, PGY-II    This note was completed in part utilizing M-Modal Fluency Direct Software  Grammatical, translation, syntax errors, random word insertions, spelling mistakes, and incomplete sentences may be an occasional consequence of this system secondary to software limitations with voice recognition, ambient noise, and hardware issues  If you have any questions or concerns about the content, text, or information contained within the body of this dictation, please contact the provider for clarification

## 2022-10-13 NOTE — NURSING NOTE
Dae Curry requested PRN medication for anxiety  Atarax 25mg was provided  Will continue to monitor and assess for safety

## 2022-10-13 NOTE — TREATMENT TEAM
10/13/22 1500   Service   Service SLIM   Provider Name Viral Cloe [facility]   Multi-disciplinary Rounds   Diagnosis  Reviewed   Pending Pathology and Pertinent Tests Most recent lab data reviewed    Notified of recent INR and NA levels  Response received and orders noted for decreased dose of coumadin

## 2022-10-13 NOTE — CASE MANAGEMENT
10/13/22 0856   Team Meeting   Meeting Type Daily Rounds   Initial Conference Date 10/13/22   Team Members Present   Team Members Present Physician;Nurse   Physician Team Member Dr Lin Gill Team Member DEVIKA [facility] Management Team Member Naya   Patient/Family Present   Patient Present No   Patient's Family Present No     Visible, social, cooperative, 6/10 depression, 3/4 anxiety, med and meal compliant, slept, attended group, brighter

## 2022-10-13 NOTE — QUICK NOTE
Informed by RN that patients sodium was 127 and BP >368-302 systolic  Lab Results   Component Value Date    SODIUM 127 (L) 10/13/2022    SODIUM 135 09/30/2022    SODIUM 130 (L) 09/28/2022     Patient has been maintained on Lasix 20 mg daily  He reports that he takes this at home as well and is compliant with it  Home regimen also includes Coreg 12 5 mg b i d     Given blood pressure was persistently elevated, Cardiology had been consulted by Psychiatry team and they had increased Coreg to 25 mg b i d on 10/10  Norvasc had then subsequently been added however the patient refused to take this on 10/12 (did take on 10/11 and 10/13) because he reported that he had a history of ankle swelling with this  This was subsequently discontinued  Spirinolactone 25 mg daily was added by cardiology and first given on 10/13  Sodium is 127 and patient reports he has not been drinking much  He reports he drinks only "one fifth" of the amount he drinks at home given he feels too weak to get up and urinate  He takes his lasix at home as directed  Discussed with cardiology  · HTN: SBP remains 180-200s despite norvasc dose this AM and spirinolactone dose  Given BP remains high despite the norvasc use, will increase coreg to 37 5 mg BID  · Hyponatremia: stop spirinolactone for now  Hold lasix for now (already received today)  Encourage PO intake  Patient and RNs aware of this  Repeat BMP in AM  Check urine sodium/urine osmol/serum osmol  · Hyperbilirubinemia: appears chronic since 2018 but did increase on 9/30 - now back to baseline  Direct bili WNL  Suspect gilberts  RUQ US without acute change  History of cholecystectomy  · Suspected hyperthyroidism: TSH 0 308 and has been as low as 0 192 since Sept 2022  Free T4 1 4  Thyroid US negative  Patient with tremors, anxiety  Will contact endocrinology - consult placed by primary service previously      Hayder Saldana PA-C

## 2022-10-13 NOTE — CONSULTS
[facility] [place] [address] Endocrinology    Endocrinology has been consulted to evaluate the patient Karuna Lindquist  Patient Information: Karuna Lindquist 58 y o  male MRN: [id number]  Unit/Bed#: Colonel Springer 865-72 Encounter: [id number]  Admitting Physician: Katelyn Dinero  PCP: Gabriel Palomares MD  Date of Consultation:  10/13/22    Patient was not seen or examined personally  Chart was reviewed and case discussed with primary service  ASSESSMENT:      PLAN:    1  Subclinical hyperthyroidism  He has a TSH of <0 5uIU/mL with a high normal fT4 since 2014  Labs are similar during current hospitalization  Differentials may include Grave's disease of toxic adenoma of thyroid  Note US thyroid was unremarkable for nodules during current hospitalization  Recommend: repeat Thyroid functions along with TSI, thyroid antibodies and T3  Follow up with endocrinology post discharge in 4 weeks  May benefit from a thyroid uptake scan as outpatient  No need for antithyroid medications based on current thyroid function tests  2  Anxiety with panic attacks  subclinical hyperthyroidism may sometimes contribute to anxiety and Afib  Prior plasma catecholamines were negative but may be reasonable to repeat a 24 hr urine for metanephrines as outpt  Note adrenal glands were unremarkable for nodules on CT abdomen with IV contrast on 7/28/22  Reviewed with medical team   Total time spend reviewing chart, ordering /analyzing data, discussion with primary team and formulating plan was 30 min  Reason for consultation:   Concern for hyperthyroidism    History of Present Illness:    Karuna Lindquist is a 58 y o  male with hx of anxiety c panic attacks, hx lacunar CVA's, bicuspid AV, Antiphospholipid antibody syndrome, Afib, HTN, CORIE and chronic low back pain  He was admitted voluntarily to psychiatry unit for anxiety management           Katelyn Dinero

## 2022-10-13 NOTE — PROGRESS NOTES
10/13/22 1000   Activity/Group Checklist   Group Other (Comment)  (Relapse Prevention Plan form)   Attendance Attended   Attendance Duration (min) 16-30   Interactions Interacted appropriately   Affect/Mood Appropriate   Goals Achieved Identified relapse prevention strategies; Identified triggers; Identified feelings; Able to listen to others; Able to engage in interactions; Able to manage/cope with feelings; Able to reflect/comment on own behavior;Able to self-disclose; Able to recieve feedback; Able to give feedback to another

## 2022-10-13 NOTE — PROGRESS NOTES
10/13/22 1100   Activity/Group Checklist   Group Other (Comment)  (Relaxation/meditation group)   Attendance Attended   Attendance Duration (min) 16-30   Interactions Interacted appropriately   Affect/Mood Appropriate   Goals Achieved Able to listen to others; Able to engage in interactions; Able to manage/cope with feelings; Able to self-disclose; Able to recieve feedback; Able to give feedback to another

## 2022-10-13 NOTE — NURSING NOTE
Patient visible in milieu, minimal interaction with peers  Social on approach and does initiate conversation  Numerous questions about medication answered to patients satisfaction  Reports depression 6/10 and anxiety 3/4

## 2022-10-13 NOTE — PROGRESS NOTES
Cardiology Progress Note - Cherie Fried 58 y o  male MRN: [id number]    Unit/Bed#: Andie Mortensen 281-65 Encounter: [id number]      Assessment & Plan:    Uncontrolled hypertension  -currently on amlodipine 5 mg daily, carvedilol 37 5 mg b i d ,  -discontinued spironolactone and Lasix today due to hyponatremia  -history of angioedema with lisinopril and losartan    Paroxysmal atrial fibrillation (HCC)  -currently in sinus rhythm  -currently anticoagulated on Coumadin  -rate control with carvedilol    SHIMON (generalized anxiety disorder)  -currently in inpatient Behavioral Health Unit  -psychiatry managing    Bicuspid aortic valve  -TTE 10/11/2022 EF 63%, grade 1 DD, bicuspid aortic valve with mild stenosis, mild TR  -aortic peak velocity 2 94 m/s    Ascending aortic aneurysm  -had hemiarch reconstruction in 2014 without replacement of the bicuspid aortic valve    History of stroke  -history of several lacunar infarcts    Antiphospholipid antibody with hypercoagulable state ([facility] )    CORIE (obstructive sleep apnea)    Occasional tremors    GERD (gastroesophageal reflux disease)    PVD (peripheral vascular disease) ([facility] )    Summary:  -spironolactone and furosemide were discontinued this morning due to hyponatremia  -started on amlodipine 5 mg daily and carvedilol increased to 37 5 mg b i d   -BP significantly improved this afternoon  -if BP becomes elevated again recommend increasing carvedilol to 50 mg b i d  and amlodipine to 10 mg daily    Subjective:   No significant events overnight  Patient reports feeling unwell because he feels weak while walking around  Acknowledges some shortness of breath and weakness  Denies chest pain, abdominal pain, nausea vomiting, fever, chills, headache, dizziness or palpitations  Objective:     Vitals: Blood pressure 133/81, pulse 86, temperature 97 8 °F (36 6 °C), temperature source Temporal, resp  rate 16, height 5' 8" (1 727 m), weight 93 4 kg (206 lb), SpO2 93 %  , Body mass index is 31 32 kg/m² ,   Orthostatic Blood Pressures    Flowsheet Row Most Recent Value   Blood Pressure 133/81 filed at 10/13/2022 1100   Patient Position - Orthostatic VS Sitting filed at 10/13/2022 1100            Intake/Output Summary (Last 24 hours) at 10/13/2022 1257  Last data filed at 10/13/2022 1209  Gross per 24 hour   Intake 960 ml   Output --   Net 960 ml           Physical Exam:    GEN: Myrna Thornton appears well, alert and oriented x 3, pleasant and cooperative   HEENT: anicteric, mucous membranes moist  NECK: no jvd, carotid bruits   HEART: regular rhythm, normal S1 and S2, +7/4 systolic murmur   LUNGS: clear to auscultation bilaterally; no wheezes, rales, or rhonchi   ABDOMEN: normal bowel sounds, soft, no tenderness, no distention  EXTREMITIES: peripheral pulses normal; no clubbing, cyanosis, or edema  NEURO: no focal findings, + tremor  SKIN: normal without suspicious lesions on exposed skin      Current Facility-Administered Medications:   •  acetaminophen (TYLENOL) tablet 650 mg, 650 mg, Oral, Q4H PRN, Polo Jackson MD, 650 mg at 10/03/22 2150  •  acetaminophen (TYLENOL) tablet 650 mg, 650 mg, Oral, Q4H PRN, Polo Jackson MD, 650 mg at 10/11/22 1537  •  acetaminophen (TYLENOL) tablet 975 mg, 975 mg, Oral, Q6H PRN, Polo Jackson MD  •  aluminum-magnesium hydroxide-simethicone (MYLANTA) oral suspension 30 mL, 30 mL, Oral, Q4H PRN, Polo Jackson MD  •  [START ON 10/14/2022] amLODIPine (NORVASC) tablet 5 mg, 5 mg, Oral, Daily, Keily Espino PA-C  •  ARIPiprazole (ABILIFY) tablet 10 mg, 10 mg, Oral, Daily, Lona Buerger, DO, 10 mg at 10/13/22 0850  •  atorvastatin (LIPITOR) tablet 40 mg, 40 mg, Oral, Daily With Lupis Gutierrez MD, 40 mg at 10/12/22 1704  •  benztropine (COGENTIN) injection 1 mg, 1 mg, Intramuscular, Q4H PRN Max 6/day, Polo Jackson MD  •  benztropine (COGENTIN) tablet 0 5 mg, 0 5 mg, Oral, Q4H PRN Max 6/day, Laura Johnson MD  •  carvedilol (COREG) tablet 37 5 mg, 37 5 mg, Oral, BID With Meals, Keyon Espino PA-C  •  famotidine (PEPCID) tablet 40 mg, 40 mg, Oral, BID, Yvette Heck PA-C, 40 mg at 10/13/22 0816  •  haloperidol lactate (HALDOL) injection 5 mg, 5 mg, Intramuscular, Q4H PRN Max 4/day, Laura Chiang MD  •  hydrOXYzine HCL (ATARAX) tablet 25 mg, 25 mg, Oral, Q6H PRN Max 4/day, Alan Gayle MD, 25 mg at 10/11/22 1357  •  LORazepam (ATIVAN) injection 1 mg, 1 mg, Intramuscular, Q6H PRN Max 3/day, Alan Gayle MD  •  LORazepam (ATIVAN) tablet 0 25 mg, 0 25 mg, Oral, HS, Facundo Alexander DO, 0 25 mg at 10/12/22 2121  •  melatonin tablet 3 mg, 3 mg, Oral, HS, Alan Gayle MD, 3 mg at 10/12/22 2120  •  pantoprazole (PROTONIX) EC tablet 40 mg, 40 mg, Oral, BID, Yvette Heck PA-C, 40 mg at 10/13/22 0850  •  polyethylene glycol (MIRALAX) packet 17 g, 17 g, Oral, Daily PRN, Alan Gayle MD, 17 g at 10/10/22 0855  •  potassium chloride (K-DUR,KLOR-CON) CR tablet 20 mEq, 20 mEq, Oral, Daily, Alan Gayle MD, 20 mEq at 10/13/22 0850  •  propranolol (INDERAL) tablet 5 mg, 5 mg, Oral, Q8H PRN, Alan Gayle MD, 5 mg at 10/10/22 1226  •  risperiDONE (RisperDAL) tablet 0 25 mg, 0 25 mg, Oral, Q4H PRN Max 6/day, Laura Chiang MD, 0 25 mg at 10/11/22 1801  •  risperiDONE (RisperDAL) tablet 0 5 mg, 0 5 mg, Oral, Q4H PRN Max 3/day, Alan Gayle MD  •  risperiDONE (RisperDAL) tablet 1 mg, 1 mg, Oral, Q2H PRN Max 3/day, Alan Gayle MD  •  senna-docusate sodium (SENOKOT S) 8 6-50 mg per tablet 1 tablet, 1 tablet, Oral, Daily PRN, Alan Gayle MD, 1 tablet at 10/10/22 1205  •  sertraline (ZOLOFT) tablet 200 mg, 200 mg, Oral, Daily, Facundo Alexander DO, 200 mg at 10/13/22 2062  •  traZODone (DESYREL) tablet 100 mg, 100 mg, Oral, HS, Facundo Alexander DO, 100 mg at 10/12/22 2121  •  warfarin (COUMADIN) tablet 4 mg, 4 mg, Oral, Daily (warfarin), Dariel Gayle PA-C    Labs & Results:    Lab Results   Component Value Date    TROPONINI <0 02 03/13/2020    TROPONINI <0 02 03/13/2020    TROPONINI <0 02 03/12/2020       Lab Results   Component Value Date    GLUCOSE 103 12/08/2014    CALCIUM 8 7 10/13/2022     03/29/2017    K 3 6 10/13/2022    CO2 29 10/13/2022    CL 92 (L) 10/13/2022    BUN 13 10/13/2022    CREATININE 0 92 10/13/2022       Lab Results   Component Value Date    WBC 7 50 09/30/2022    HGB 16 4 09/30/2022    HCT 46 4 09/30/2022    MCV 86 09/30/2022     09/30/2022     Results from last 7 days   Lab Units 10/13/22  0653   INR  3 20*       Lab Results   Component Value Date    CHOL 210 (H) 03/01/2017    CHOL 192 11/29/2016     Lab Results   Component Value Date    HDL 34 (L) 09/30/2022    HDL 35 (L) 08/23/2022     Lab Results   Component Value Date    LDLCALC 68 09/30/2022    LDLCALC 38 08/23/2022     Lab Results   Component Value Date    TRIG 90 09/30/2022    TRIG 78 08/23/2022       Lab Results   Component Value Date    ALT 26 10/13/2022    AST 27 10/13/2022         EKG personally reviewed by Telma Miles   No acute changes

## 2022-10-13 NOTE — PLAN OF CARE
Problem: DISCHARGE PLANNING  Goal: Discharge to home or other facility with appropriate resources  Description: INTERVENTIONS:  - Identify barriers to discharge w/patient and caregiver  - Arrange for needed discharge resources and transportation as appropriate  - Identify discharge learning needs (meds, wound care, etc )  - Arrange for interpretive services to assist at discharge as needed  - Refer to Case Management Department for coordinating discharge planning if the patient needs post-hospital services based on physician/advanced practitioner order or complex needs related to functional status, cognitive ability, or social support system  Outcome: Not Progressing     Problem: Ineffective Coping  Goal: Identifies ineffective coping skills  Outcome: Not Progressing  Goal: Identifies healthy coping skills  Outcome: Not Progressing  Goal: Demonstrates healthy coping skills  Outcome: Not Progressing  Goal: Participates in unit activities  Description: Interventions:  - Provide therapeutic environment   - Provide required programming   - Redirect inappropriate behaviors   Outcome: Not Progressing  Goal: Patient/Family participate in treatment and DC plans  Description: Interventions:  - Provide therapeutic environment  Outcome: Not Progressing  Goal: Patient/Family verbalizes awareness of resources  Outcome: Not Progressing  Goal: Understands least restrictive measures  Description: Interventions:  - Utilize least restrictive behavior  Outcome: Not Progressing  Goal: Free from restraint events  Description: - Utilize least restrictive measures   - Provide behavioral interventions   - Redirect inappropriate behaviors   Outcome: Not Progressing     Problem: Anxiety  Goal: Anxiety is at manageable level  Description: Interventions:  - Assess and monitor patient's anxiety level     - Monitor for signs and symptoms (heart palpitations, chest pain, shortness of breath, headaches, nausea, feeling jumpy, restlessness, irritable, apprehensive)  - Collaborate with interdisciplinary team and initiate plan and interventions as ordered  - [place] patient to unit/surroundings  - Explain treatment plan  - Encourage participation in care  - Encourage verbalization of concerns/fears  - Identify coping mechanisms  - Assist in developing anxiety-reducing skills  - Administer/offer alternative therapies  - Limit or eliminate stimulants  Outcome: Not Progressing     Problem: Nutrition/Hydration-ADULT  Goal: Nutrient/Hydration intake appropriate for improving, restoring or maintaining nutritional needs  Description: Monitor and assess patient's nutrition/hydration status for malnutrition  Collaborate with interdisciplinary team and initiate plan and interventions as ordered  Monitor patient's weight and dietary intake as ordered or per policy  Utilize nutrition screening tool and intervene as necessary  Determine patient's food preferences and provide high-protein, high-caloric foods as appropriate       INTERVENTIONS:  - Monitor oral intake, urinary output, labs, and treatment plans  - Assess nutrition and hydration status and recommend course of action  - Evaluate amount of meals eaten  - Assist patient with eating if necessary   - Allow adequate time for meals  - Recommend/ encourage appropriate diets, oral nutritional supplements, and vitamin/mineral supplements  - Order, calculate, and assess calorie counts as needed  - Recommend, monitor, and adjust tube feedings and TPN/PPN based on assessed needs  - Assess need for intravenous fluids  - Provide specific nutrition/hydration education as appropriate  - Include patient/family/caregiver in decisions related to nutrition  Outcome: Not Progressing

## 2022-10-14 PROBLEM — E87.1 HYPONATREMIA: Status: ACTIVE | Noted: 2022-10-14

## 2022-10-14 PROBLEM — E05.90 SUBCLINICAL HYPERTHYROIDISM: Status: ACTIVE | Noted: 2022-10-14

## 2022-10-14 PROBLEM — E22.2 SIADH (SYNDROME OF INAPPROPRIATE ADH PRODUCTION) (HCC): Status: ACTIVE | Noted: 2022-10-14

## 2022-10-14 LAB
2HR DELTA HS TROPONIN: 0 NG/L
4HR DELTA HS TROPONIN: 1 NG/L
ANION GAP SERPL CALCULATED.3IONS-SCNC: 5 MMOL/L (ref 5–14)
ATRIAL RATE: 77 BPM
ATRIAL RATE: 78 BPM
BUN SERPL-MCNC: 16 MG/DL (ref 5–25)
CALCIUM SERPL-MCNC: 8.8 MG/DL (ref 8.4–10.2)
CARDIAC TROPONIN I PNL SERPL HS: 10 NG/L
CARDIAC TROPONIN I PNL SERPL HS: 10 NG/L
CARDIAC TROPONIN I PNL SERPL HS: 11 NG/L
CHLORIDE SERPL-SCNC: 92 MMOL/L (ref 96–108)
CO2 SERPL-SCNC: 31 MMOL/L (ref 21–32)
CREAT SERPL-MCNC: 1.09 MG/DL (ref 0.7–1.5)
GFR SERPL CREATININE-BSD FRML MDRD: 72 ML/MIN/1.73SQ M
GLUCOSE SERPL-MCNC: 117 MG/DL (ref 70–99)
INR PPP: 2.51 (ref 0.84–1.19)
OSMOLALITY UR: 519 MMOL/KG
P AXIS: 39 DEGREES
P AXIS: 49 DEGREES
POTASSIUM SERPL-SCNC: 3.5 MMOL/L (ref 3.5–5.3)
PR INTERVAL: 146 MS
PR INTERVAL: 150 MS
PROTHROMBIN TIME: 27.6 SECONDS (ref 11.6–14.5)
QRS AXIS: 2 DEGREES
QRS AXIS: 22 DEGREES
QRSD INTERVAL: 80 MS
QRSD INTERVAL: 84 MS
QT INTERVAL: 382 MS
QT INTERVAL: 394 MS
QTC INTERVAL: 432 MS
QTC INTERVAL: 449 MS
SODIUM 24H UR-SCNC: 16 MOL/L
SODIUM SERPL-SCNC: 126 MMOL/L (ref 135–147)
SODIUM SERPL-SCNC: 128 MMOL/L (ref 135–147)
T WAVE AXIS: -5 DEGREES
T WAVE AXIS: 13 DEGREES
T3 SERPL-MCNC: 0.7 NG/ML (ref 0.6–1.8)
T4 FREE SERPL-MCNC: 1.55 NG/DL (ref 0.76–1.46)
TSH SERPL DL<=0.05 MIU/L-ACNC: 0.71 UIU/ML (ref 0.45–4.5)
VENTRICULAR RATE: 77 BPM
VENTRICULAR RATE: 78 BPM

## 2022-10-14 PROCEDURE — 93005 ELECTROCARDIOGRAM TRACING: CPT

## 2022-10-14 PROCEDURE — 99232 SBSQ HOSP IP/OBS MODERATE 35: CPT | Performed by: INTERNAL MEDICINE

## 2022-10-14 PROCEDURE — 99222 1ST HOSP IP/OBS MODERATE 55: CPT | Performed by: INTERNAL MEDICINE

## 2022-10-14 PROCEDURE — 86376 MICROSOMAL ANTIBODY EACH: CPT | Performed by: INTERNAL MEDICINE

## 2022-10-14 PROCEDURE — 83935 ASSAY OF URINE OSMOLALITY: CPT | Performed by: INTERNAL MEDICINE

## 2022-10-14 PROCEDURE — 93010 ELECTROCARDIOGRAM REPORT: CPT | Performed by: INTERNAL MEDICINE

## 2022-10-14 PROCEDURE — 84484 ASSAY OF TROPONIN QUANT: CPT | Performed by: INTERNAL MEDICINE

## 2022-10-14 PROCEDURE — 84295 ASSAY OF SERUM SODIUM: CPT

## 2022-10-14 PROCEDURE — 84445 ASSAY OF TSI GLOBULIN: CPT | Performed by: INTERNAL MEDICINE

## 2022-10-14 PROCEDURE — 99232 SBSQ HOSP IP/OBS MODERATE 35: CPT | Performed by: STUDENT IN AN ORGANIZED HEALTH CARE EDUCATION/TRAINING PROGRAM

## 2022-10-14 PROCEDURE — 84439 ASSAY OF FREE THYROXINE: CPT | Performed by: INTERNAL MEDICINE

## 2022-10-14 PROCEDURE — 84300 ASSAY OF URINE SODIUM: CPT | Performed by: INTERNAL MEDICINE

## 2022-10-14 PROCEDURE — 85610 PROTHROMBIN TIME: CPT | Performed by: PHYSICIAN ASSISTANT

## 2022-10-14 PROCEDURE — 84480 ASSAY TRIIODOTHYRONINE (T3): CPT | Performed by: INTERNAL MEDICINE

## 2022-10-14 PROCEDURE — 80048 BASIC METABOLIC PNL TOTAL CA: CPT | Performed by: INTERNAL MEDICINE

## 2022-10-14 PROCEDURE — 84443 ASSAY THYROID STIM HORMONE: CPT | Performed by: INTERNAL MEDICINE

## 2022-10-14 PROCEDURE — 86800 THYROGLOBULIN ANTIBODY: CPT | Performed by: INTERNAL MEDICINE

## 2022-10-14 RX ORDER — LORAZEPAM 0.5 MG/1
0.5 TABLET ORAL ONCE
Status: COMPLETED | OUTPATIENT
Start: 2022-10-14 | End: 2022-10-14

## 2022-10-14 RX ORDER — LORAZEPAM 0.5 MG/1
0.25 TABLET ORAL 3 TIMES DAILY
Status: DISCONTINUED | OUTPATIENT
Start: 2022-10-14 | End: 2022-10-21

## 2022-10-14 RX ORDER — SODIUM CHLORIDE 1000 MG
1 TABLET, SOLUBLE MISCELLANEOUS
Status: DISCONTINUED | OUTPATIENT
Start: 2022-10-14 | End: 2022-10-16

## 2022-10-14 RX ADMIN — FAMOTIDINE 40 MG: 20 TABLET ORAL at 17:22

## 2022-10-14 RX ADMIN — FAMOTIDINE 40 MG: 20 TABLET ORAL at 08:30

## 2022-10-14 RX ADMIN — SODIUM CHLORIDE 1 G: 1 TABLET ORAL at 11:05

## 2022-10-14 RX ADMIN — SODIUM CHLORIDE 1 G: 1 TABLET ORAL at 17:22

## 2022-10-14 RX ADMIN — AMLODIPINE BESYLATE 5 MG: 5 TABLET ORAL at 08:30

## 2022-10-14 RX ADMIN — SENNOSIDES AND DOCUSATE SODIUM 1 TABLET: 50; 8.6 TABLET ORAL at 18:14

## 2022-10-14 RX ADMIN — LORAZEPAM 0.25 MG: 0.5 TABLET ORAL at 21:08

## 2022-10-14 RX ADMIN — CARVEDILOL 37.5 MG: 12.5 TABLET, FILM COATED ORAL at 08:31

## 2022-10-14 RX ADMIN — POTASSIUM CHLORIDE 20 MEQ: 1500 TABLET, EXTENDED RELEASE ORAL at 08:30

## 2022-10-14 RX ADMIN — PANTOPRAZOLE SODIUM 40 MG: 40 TABLET, DELAYED RELEASE ORAL at 21:08

## 2022-10-14 RX ADMIN — TRAZODONE HYDROCHLORIDE 100 MG: 100 TABLET ORAL at 21:08

## 2022-10-14 RX ADMIN — ATORVASTATIN CALCIUM 40 MG: 40 TABLET, FILM COATED ORAL at 17:22

## 2022-10-14 RX ADMIN — MELATONIN TAB 3 MG 3 MG: 3 TAB at 21:08

## 2022-10-14 RX ADMIN — PANTOPRAZOLE SODIUM 40 MG: 40 TABLET, DELAYED RELEASE ORAL at 08:31

## 2022-10-14 RX ADMIN — ARIPIPRAZOLE 10 MG: 10 TABLET ORAL at 08:30

## 2022-10-14 RX ADMIN — LORAZEPAM 0.5 MG: 0.5 TABLET ORAL at 11:05

## 2022-10-14 RX ADMIN — WARFARIN SODIUM 2 MG: 2 TABLET ORAL at 17:22

## 2022-10-14 RX ADMIN — SODIUM CHLORIDE 1 G: 1 TABLET ORAL at 21:08

## 2022-10-14 RX ADMIN — LORAZEPAM 0.25 MG: 0.5 TABLET ORAL at 17:22

## 2022-10-14 NOTE — PLAN OF CARE
Problem: Ineffective Coping  Goal: Identifies ineffective coping skills  Outcome: Progressing  Goal: Identifies healthy coping skills  Outcome: Progressing  Goal: Demonstrates healthy coping skills  Outcome: Progressing  Goal: Patient/Family participate in treatment and DC plans  Description: Interventions:  - Provide therapeutic environment  Outcome: Progressing  Goal: Patient/Family verbalizes awareness of resources  Outcome: Progressing  Goal: Understands least restrictive measures  Description: Interventions:  - Utilize least restrictive behavior  Outcome: Progressing  Goal: Free from restraint events  Description: - Utilize least restrictive measures   - Provide behavioral interventions   - Redirect inappropriate behaviors   Outcome: Progressing     Problem: Anxiety  Goal: Anxiety is at manageable level  Description: Interventions:  - Assess and monitor patient's anxiety level  - Monitor for signs and symptoms (heart palpitations, chest pain, shortness of breath, headaches, nausea, feeling jumpy, restlessness, irritable, apprehensive)  - Collaborate with interdisciplinary team and initiate plan and interventions as ordered  - [place] patient to unit/surroundings  - Explain treatment plan  - Encourage participation in care  - Encourage verbalization of concerns/fears  - Identify coping mechanisms  - Assist in developing anxiety-reducing skills  - Administer/offer alternative therapies  - Limit or eliminate stimulants  Outcome: Progressing     Problem: Nutrition/Hydration-ADULT  Goal: Nutrient/Hydration intake appropriate for improving, restoring or maintaining nutritional needs  Description: Monitor and assess patient's nutrition/hydration status for malnutrition  Collaborate with interdisciplinary team and initiate plan and interventions as ordered  Monitor patient's weight and dietary intake as ordered or per policy  Utilize nutrition screening tool and intervene as necessary   Determine patient's food preferences and provide high-protein, high-caloric foods as appropriate       INTERVENTIONS:  - Monitor oral intake, urinary output, labs, and treatment plans  - Assess nutrition and hydration status and recommend course of action  - Evaluate amount of meals eaten  - Assist patient with eating if necessary   - Allow adequate time for meals  - Recommend/ encourage appropriate diets, oral nutritional supplements, and vitamin/mineral supplements  - Order, calculate, and assess calorie counts as needed  - Recommend, monitor, and adjust tube feedings and TPN/PPN based on assessed needs  - Assess need for intravenous fluids  - Provide specific nutrition/hydration education as appropriate  - Include patient/family/caregiver in decisions related to nutrition  Outcome: Progressing

## 2022-10-14 NOTE — NURSING NOTE
Paged and spoke with MD Sandoval about pt critical result of positive MRSA in the nares. No new order at this time. Will continue to monitor.    Patient has demonstrated anxiety frequently throughout day which improved with administration of one time dose of lorazepam  Patient has had concerns with physical complaints as well as other complaints such as ice being in water and choking, where he was going to sit, and his fluid restriction  Patient has had his fluid restrictions explained to him multiple times, most reassurance has been ineffective  He is present in the milieu, social with others  Compliant with medication

## 2022-10-14 NOTE — PHYSICAL THERAPY NOTE
Physical Therapy Evaluation    Patient's Name: Gus Kilgore    Admitting Diagnosis  Anxiety [F41 9]  Paroxysmal atrial fibrillation ([address] ) [I48 0]  Depression [F32  A]  Anticoagulated on Coumadin [Z79 01]  Encounter for psychological evaluation [Z00 8]    Problem List  Patient Active Problem List   Diagnosis    Numbness    H/O aortic aneurysm repair    Hypertension    GERD (gastroesophageal reflux disease)    SHIMON (generalized anxiety disorder)    Atrial fibrillation (HCC)    Bright red blood per rectum    Constipation    Irritable bowel syndrome    Hyperlipidemia    Kidney stones    Peyronie disease    Vitamin D deficiency    Class 2 obesity in adult    Other viral warts    Physical deconditioning    History of stroke    Antiphospholipid antibody with hypercoagulable state (HCC)    Hyperbilirubinemia    CORIE (obstructive sleep apnea)    Anticoagulated on Coumadin    Occasional tremors    Weakness of both lower extremities    Gastric polyps    Gastric AVM    Edema of both lower legs    Non-recurrent bilateral inguinal hernia without obstruction or gangrene    S/P laparoscopic hernia repair    Low back pain    Alkaline phosphatase elevation    Right inguinal pain    Ataxia    CVA (cerebral vascular accident) ([address] )    PVD (peripheral vascular disease) ([address] )    Renal cyst    Atypical chest pain    Hypokalemia    Dizziness    Medical clearance for incarceration    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 attack    Mild protein-calorie malnutrition ([address] )    Sinus arrhythmia    Supratherapeutic INR       Past Medical History  Past Medical History:   Diagnosis Date    Aneurysm of thoracic aorta ([address] )     last assessed 11/20/17    Anxiety     currently on no meds    Arthritis     Bilateral inguinal hernia     Cardiac disease     Cognitive impairment     CVA (cerebral vascular accident) ([address] )     Depression     GERD (gastroesophageal reflux disease)     Hearing loss of aging     Heart disease Hyperlipidemia     Hypertension     Irritable bowel syndrome     Kidney stone     Obesity     Occasional tremors     left arm since stroke    Palpitations     Panic attack     PONV (postoperative nausea and vomiting)     and headache x 3 days    Psychiatric illness     Sciatica     right and left  side    Shortness of breath     on exertion    Sleep apnea     is not using a CPAP    Sleep difficulties     Spitting up blood 05/21/2021    Resolved    Status post placement of implantable loop recorder     Stroke ([facility] ) 11/2014    Stroke ([facility] ) 03/2021    TIA (transient ischemic attack)        Past Surgical History  Past Surgical History:   Procedure Laterality Date    AORTA SURGERY      thoracic - aneurysmorrhaphy    APPENDECTOMY      ASCENDING AORTIC ANEURYSM REPAIR      resolved 8/19/15    CARDIAC LOOP RECORDER      CHOLECYSTECTOMY      laparoscopic    COLONOSCOPY      CYSTOSCOPY      with removal of object- stent removal    KNEE ARTHROSCOPY Right 1996    with medial meniscus repair    LITHOTRIPSY      renal    ORTHOPEDIC SURGERY      NH FRAGMENT KIDNEY STONE/ ESWL Left 5/17/2018    Procedure: LITHROTRIPSY EXTRACORPORAL SHOCKWAVE (ESWL); Surgeon: Nano Jo MD;  Location: [facility];  Service: Urology    [facility] [address] Bilateral 12/9/2021    Procedure: ROBOTIC REPAIR HERNIA INGUINAL;  Surgeon: Maycol Barlow MD;  Location: [facility];  Service: General    THUMB ARTHROSCOPY Right 1976    ligament repair    UPPER GASTROINTESTINAL ENDOSCOPY         Recent Imaging  US right upper quadrant   Final Result by Adrian Pérez MD (10/12 0502)      No acute process identified  Workstation performed: [id number]         US thyroid   Final Result by Baltazar Fajardo MD (10/10 )      Normal examination  Reference: ACR Thyroid Imaging, Reporting and Data System (TI-RADS): White Paper of the [employer]   J AM Nile Radiol 4975;[id number]  (additional recommendations based on American Thyroid Association 2015 guidelines )         Workstation performed: [id number]             Recent Vital Signs  Vitals:    10/13/22 1700 10/13/22 2049 10/13/22 2100 10/14/22 0739   BP: 155/87  142/92 (!) 183/91   BP Location:   Right arm Right arm   Pulse: 89 86  91   Resp:  18  19   Temp:  97 5 °F (36 4 °C)  97 5 °F (36 4 °C)   TempSrc:  Temporal  Temporal   SpO2:  95%  95%   Weight:       Height:            10/13/22 1345   PT Last Visit   PT Visit Date 10/13/22   Note Type   Note type Evaluation   Pain Assessment   Pain Assessment Tool 0-10   Pain Score No Pain  (reports burning pain in lower legs with walking)   Restrictions/Precautions   Other Precautions Cognitive; Fall Risk   Home Living   Type of [address] [place] [address] Two level;Bed/bath upstairs;Stairs to enter with rails  ([address])   Bathroom Shower/Tub Tub/shower unit   Prior Function   Level of [place] Independent with ADLs; Needs assistance with IADLS   Lives With   (sister)   Receives Help From Family   General   Family/Caregiver Present No   Cognition   Overall Cognitive Status Impaired   Arousal/Participation Lethargic   Orientation Level Oriented X4   Following Commands Follows one step commands without difficulty   RLE Assessment   RLE Assessment   (3+/5)   LLE Assessment   LLE Assessment   (3+/5)   Coordination   Movements are Fluid and Coordinated 0   Coordination and Movement Description short shuffling steps with gait, decreased gross motor coordination   Sensation X   Light Touch   RLE Light Touch Impaired   RLE Light Touch Comments decreased distally   LLE Light Touch Impaired   LLE Light Touch Comments decreased distally   Transfers   Sit to Stand 5  Supervision   Additional items Assist x 1; Armrests; Increased time required;Verbal cues   Stand to Sit 5  Supervision   Additional items Assist x 1; Armrests; Increased time required;Verbal cues   Additional Comments pt requires increased time and VC for hand placement Ambulation/Elevation   Gait pattern Decreased toe off;Decreased heel strike;Knees flexed; Excessively slow; Short stride; Foward flexed; Shuffling;Decreased R stance;Decreased L stance;Decreased foot clearance;Narrow TEE; Improper Weight shift   Gait Assistance 5  Supervision   Additional items Assist x 1;Verbal cues; Tactile cues   Assistive Device Rolling walker   Distance 55ft   Balance   Static Sitting Fair +   Dynamic Sitting Fair +   Static Standing Fair   Dynamic Standing Fair -   Ambulatory Fair -   Endurance Deficit   Endurance Deficit Yes   Endurance Deficit Description reports LE pain and fatigue   Activity Tolerance   Activity Tolerance Patient limited by fatigue;Patient limited by pain   Medical Staff Made Aware spoke to CM   Nurse Made Aware spoke to RN   Assessment   Prognosis Good   Problem List Decreased strength;Decreased endurance; Impaired balance;Decreased mobility; Decreased coordination;Decreased cognition; Impaired sensation;Pain   Barriers to Discharge Inaccessible home environment;Decreased caregiver support   Goals   Patient Goals to get legs stronger   STG Expiration Date 10/24/22   Short Term Goal #1 see eval note   PT Treatment Day 1   Plan   Treatment/Interventions ADL retraining;Functional transfer training;LE strengthening/ROM; Elevations; Therapeutic exercise; Endurance training;Patient/family training;Equipment eval/education; Bed mobility;Gait training;Spoke to nursing;Spoke to case management;OT   PT Frequency 2-3x/wk   Recommendation   PT Discharge Recommendation Home with home health rehabilitation  (pending progress)   Equipment Recommended [address]   Turning in Bed Without Bedrails 3   Lying on Back to Sitting on Edge of Flat Bed 3   Moving Bed to Chair 3   Standing Up From Chair 3   Walk in Room 3   Climb 3-5 Stairs 3   Basic Mobility Inpatient Raw Score 18   Basic Mobility Standardized Score 41 05   Highest Level Of Mobility   JH-HLM Goal 6: Walk 10 steps or more   JH-HLM Achieved 7: Walk 25 feet or more   Additional Treatment Session   Start Time 1400   End Time 1415   Treatment Assessment Pt participated in additional gait training and transfers training from chair and bed  Pt continues to report pain and fatigue in the LEs during activity  Shuffling gait pattern with VC to improve step length and heel strike/toe off pt able to correct for 1-2 steps  Equipment Use RW   End of Consult   Patient Position at End of Consult Bedside chair; All needs within reach         [address] Shana[address] Baez is a 58 y o  male admitted to [facility] on 9/28/2022 for SHIMON (generalized anxiety disorder)  Pt  has a past medical history of Aneurysm of thoracic aorta ([facility] ), Anxiety, Arthritis, Bilateral inguinal hernia, Cardiac disease, Cognitive impairment, CVA (cerebral vascular accident) ([facility] ), Depression, GERD (gastroesophageal reflux disease), Hearing loss of aging, Heart disease, Hyperlipidemia, Hypertension, Irritable bowel syndrome, Kidney stone, Obesity, Occasional tremors, Palpitations, Panic attack, PONV (postoperative nausea and vomiting), Psychiatric illness, Sciatica, Shortness of breath, Sleep apnea, Sleep difficulties, Spitting up blood (05/21/2021), Status post placement of implantable loop recorder, Stroke ([facility] ) (11/2014), Stroke ([facility] ) (03/2021), and TIA (transient ischemic attack)    PT was consulted and pt was seen on 10/14/2022 for mobility assessment and d/c planning  Pt presents seated in common room alert and agreeable to therapy  Impairments limiting pt at this time include impaired balance, decreased endurance, decreased coordination, increased fall risk, new onset of impairment of functional mobility, decreased ADLS, decreased IADLS, pain, decreased sensation, and decreased strength   Pt is currently functioning at a supervision assistance x1 level for transfers, supervision assistance x1 level for ambulation with Rolling Walker  The patient's AM-PAC Basic Mobility Inpatient Short Form Raw Score is 18  A Raw score of greater than 16 suggests the patient may benefit from discharge to home  Please also refer to the recommendation of the Physical Therapist for safe discharge planning  Goals                                                                                                                                    1) Bed mobility skills with modified independent assistance to facilitate safe return to previous living environment 2) Functional transfers with modified independent assistance to facilitate safe return to previous living environment  3) Ambulation with least restrictive AD modified independent assistance without LOB and stable vitals for safe ambulation home/ community distances  4) Stair training up/down flight 12 step/s with appropriate rail/s  and modified independent assistance for safe access to previous living environment  5) Improve balance grades to fair + to reduce risk of falls  6)Improve LE strength grades by 1 to increase independence w/ transfers and gait  7) PT for ongoing pt and family education; DME needs and D/C planning to promote highest level of function in least restrictive environment  Recommendations                                                                                                              Pt will benefit from continued skilled IP PT to address the above mentioned impairments in order to maximize recovery and increase functional independence when completing mobility and ADLs  See flow sheet for goals and POC       DME: Calleen Figures    Discharge Disposition:  Home with [id number] PT, DPT

## 2022-10-14 NOTE — PROGRESS NOTES
10/14/22 1000   Activity/Group Checklist   Group Self Esteem   Attendance Attended   Attendance Duration (min) 16-30   Interactions Disorganized interaction   Affect/Mood Calm; Appropriate   Goals Achieved Discussed self-esteem issues; Able to listen to others; Able to self-disclose; Able to recieve feedback

## 2022-10-14 NOTE — PLAN OF CARE
Problem: PHYSICAL THERAPY ADULT  Goal: Performs mobility at highest level of function for planned discharge setting  See evaluation for individualized goals  Description: Treatment/Interventions: ADL retraining, Functional transfer training, LE strengthening/ROM, Elevations, Therapeutic exercise, Endurance training, Patient/family training, Equipment eval/education, Bed mobility, Gait training, Spoke to nursing, Spoke to case management, OT  Equipment Recommended: Robin Ambrocio       See flowsheet documentation for full assessment, interventions and recommendations  Outcome: Progressing  Note: Prognosis: Good  Problem List: Decreased strength, Decreased endurance, Impaired balance, Decreased mobility, Decreased coordination, Decreased cognition, Impaired sensation, Pain     Barriers to Discharge: Inaccessible home environment, Decreased caregiver support     PT Discharge Recommendation: Home with home health rehabilitation (pending progress)    See flowsheet documentation for full assessment

## 2022-10-14 NOTE — PROGRESS NOTES
[facility]  Progress Note Donald Rose 1960, 58 y o  male MRN: [id number]  Unit/Bed#: Pilo Benavidez 643-62 Encounter: [id number]  Primary Care Provider: Zachery Braswell MD   Date and time admitted to hospital: 2022  6:28 PM    Subclinical hyperthyroidism  Assessment & Plan  · Endocrinologist on board - repeat TFT along with TSI, thyroid antibodies  · Outpatient follow-up with endocrinologist and possible thyroid uptake scanin 4 weeks after discharge  · No need for antithyroid medications    Hyponatremia  Assessment & Plan  · Hyponatremia improving  · Follow BMP Q 8h  · CT negative for evidence of malignancy  · Nephrology on board - appreciate recommendation  · Continue to hold diuretic, started salt tablets 1 g 4 times a day    Supratherapeutic INR  Assessment & Plan  · INR at 2 51 today  · Continue warfarin 2 mg daily  · Continue to trend INR    Hyperbilirubinemia  Assessment & Plan  · T  bilil at 3 57 on 22  This appears to be chronic however bilirubin has been gradually increasing over the last year  · LD and retic count WNL  Liver enzymes stable  · Likely Gilbert's syndrome  · RUQ ultrasound showed no acute process       VTE Pharmacologic Prophylaxis:   Pharmacologic: Warfarin (Coumadin)  Mechanical VTE Prophylaxis in Place: Yes      Discussions with Specialists or Other Care Team Provider:  Discussed with nurse    Time Spent for Care: 20 minutes  More than 50% of total time spent on counseling and coordination of care as described above  Current Length of Stay: 15 day(s)    Current Patient Status: Inpatient Psych     Code Status: Level 1 - Full Code      Subjective:   According to RN, patient had chest pain this morning  Currently patient is asymptomatic      Objective:     Vitals:   Temp (24hrs), Av 5 °F (36 4 °C), Min:97 5 °F (36 4 °C), Max:97 5 °F (36 4 °C)    Temp:  [97 5 °F (36 4 °C)] 97 5 °F (36 4 °C)  HR:  [80-91] 90  Resp:  [16-19] 16  BP: (114-183)/(57-92) 114/57  SpO2:  [95 %] 95 %  Body mass index is 31 32 kg/m²  Input and Output Summary (last 24 hours): Intake/Output Summary (Last 24 hours) at 10/14/2022 1557  Last data filed at 10/14/2022 1305  Gross per 24 hour   Intake 600 ml   Output --   Net 600 ml       Additional Data:     Labs:        Results from last 7 days   Lab Units 10/14/22  1435 10/13/22  1348 10/13/22  0657   POTASSIUM mmol/L 3 5   < > 3 6   CHLORIDE mmol/L 92*   < > 92*   CO2 mmol/L 31   < > 29   BUN mg/dL 16   < > 13   CREATININE mg/dL 1 09   < > 0 92   CALCIUM mg/dL 8 8   < > 8 7   ALK PHOS U/L  --   --  83   ALT U/L  --   --  26   AST U/L  --   --  27    < > = values in this interval not displayed  Results from last 7 days   Lab Units 10/14/22  1136   INR  2 51*       * I Have Reviewed All Lab Data Listed Above  * Additional Pertinent Lab Tests Reviewed: Nel 66 Admission Reviewed    Imaging:      I have reviewed pertinent imaging        Recent Cultures (last 7 days):           Last 24 Hours Medication List:   Current Facility-Administered Medications   Medication Dose Route Frequency Provider Last Rate   • acetaminophen  650 mg Oral Q4H PRN Alan Gayle MD     • acetaminophen  650 mg Oral Q4H PRN Alan Gayle MD     • acetaminophen  975 mg Oral Q6H PRN Alan Gayle MD     • aluminum-magnesium hydroxide-simethicone  30 mL Oral Q4H PRN Alan Gayle MD     • amLODIPine  5 mg Oral Daily Keily Espino PA-C     • ARIPiprazole  10 mg Oral Daily Facundo Alexander DO     • atorvastatin  40 mg Oral Daily With Ajit Tirado MD     • benztropine  1 mg Intramuscular Q4H PRN Max 6/day Alan Gayle MD     • benztropine  0 5 mg Oral Q4H PRN Max 6/day Alan Gayle MD     • carvedilol  37 5 mg Oral BID With Meals Keily Espino PA-C     • famotidine  40 mg Oral BID Dariel Gayle PA-C     • haloperidol lactate  5 mg Intramuscular Q4H PRN Max 4/day Laura Chiang MD     • hydrOXYzine HCL  25 mg Oral Q6H PRN Max 4/day Tenzin Cabral MD     • LORazepam  1 mg Intramuscular Q6H PRN Max 3/day Tenzin Cabral MD     • LORazepam  0 25 mg Oral TID Shan Palencia DO     • melatonin  3 mg Oral HS Tenzin Cabral MD     • pantoprazole  40 mg Oral BID Najma Sommers PA-C     • polyethylene glycol  17 g Oral Daily PRN Tenzin Cabral MD     • potassium chloride  20 mEq Oral Daily Laura Nance MD     • propranolol  5 mg Oral Q8H PRN Tenzin Cabral MD     • risperiDONE  0 25 mg Oral Q4H PRN Max 6/day Tenzin Cabral MD     • risperiDONE  0 5 mg Oral Q4H PRN Max 3/day Tenzin Cabral MD     • risperiDONE  1 mg Oral Q2H PRN Max 3/day Tenzin Cabral MD     • senna-docusate sodium  1 tablet Oral Daily PRN Tnezin Cabral MD     • sodium chloride  1 g Oral 4x Daily (with meals and at bedtime) Sloane Kirk DO     • traZODone  100 mg Oral HS Shan Palencia DO     • warfarin  2 mg Oral Daily (warfarin) Antonino Dunham PA-C          Today, Patient Was Seen By: Froylan Shone    ** Please Note: Dragon 360 Dictation voice to text software may have been used in the creation of this document   **

## 2022-10-14 NOTE — CMS CERTIFICATION NOTE
I have seen this patient personally  and I was  available to the Dr Jovany Nielsen DO for supervision    This note was reviewed and I approve the findings and recommendations

## 2022-10-14 NOTE — CONSULTS
Consultation - Nephrology   Jatin Gomez 58 y o  male MRN: [id number]  Unit/Bed#: Manish Stringer 602-01 Encounter: [id number]      Assessment/Plan:  1  Acute hyponatremia, etiology most likely multifactorial   Could be secondary to previous diuretic use or medications (sertraline), would recommend checking urinary studies, AM cortisol  Continue with Q 8 hour laboratory studies  Add empiric sodium chloride tablets 1 g 4 times daily  · Urine studies pending  · Pending morning cortisol  · TSH 0 709  · CT the abdomen pelvis (7/28) showed no evidence of malignancy  · CTA of the chest (08/08/2022) no evidence of malignancy  2  Grade 1 diastolic dysfunction ejection fraction of 63% with bicuspid aortic valve  No evidence of significant stenosis  3  Hypertension, suboptimal, continue with carvedilol, consider increasing amlodipine to b i d  - Holding diuretic therapy  4  Paroxysmal atrial fibrillation currently on Coumadin  5  Hypercoagulable state with history of antiphospholipid antibody  6  History of CVA  7  History of aortic aneurysm with hemiarch reconstruction 2014  8  Suspected bilateral renal cysts    Plan:  · Development of hyponatremia since 09/28  Initially 130 and then 135 on 09/30, currently in now 126  · Noted significant weight loss of approximately 60 lb since the prior 6 months  · Volume status appears stable agree with holding diuretic therapy  · May be secondary to Zoloft  Urinary studies are pending  Empirically will add sodium chloride tablets 1 g 4 times daily  · Recommend repeating laboratory studies every 8 hours  · Discussed with patient    · Discussed with primary service    History of Present Illness   Physician Requesting Consult: Andie Massey MD  Reason for Consult / Principal Problem:  Hyponatremia  HPI: Jatin Gomez is a 58y o  year old male who presents with anxiety attacks    Patient is a 71-year-old male with a complex past medical history significant for multiple lacunar infarcts, bicuspid aortic valve, anti phospholipid antibody, atrial fibrillation, hypertension, obstructive sleep apnea, diastolic heart failure as well as chronic lower back pain  Patient was admitted voluntary to the psychiatric unit secondary to anxiety management  Laboratory studies showed a normal sodium of 135 on 09/30  Repeat values approximately 126  Patient states he has been eating a fairly normally  Does complain of constipation  Denies any significant lower extremity swelling  Does complain of occasional chest pressure but relates this to his anxiety  No significant shortness of breath  Does complain of tremor  Needs a walker for stability  History obtained from chart review and the patient    Review of Systems   Constitutional: Positive for fatigue  Negative for appetite change  Gastrointestinal: Positive for abdominal distention and constipation  Negative for abdominal pain  Genitourinary: Negative for difficulty urinating  Neurological: Positive for weakness  Negative for dizziness, syncope and light-headedness  Psychiatric/Behavioral: The patient is nervous/anxious          Pertinent findings of a 10 point review of systems noted above otherwise all others negative    Historical Information   Patient Active Problem List   Diagnosis   • Numbness   • H/O aortic aneurysm repair   • Hypertension   • GERD (gastroesophageal reflux disease)   • SHIMON (generalized anxiety disorder)   • Atrial fibrillation (HCC)   • Bright red blood per rectum   • Constipation   • Irritable bowel syndrome   • Hyperlipidemia   • Kidney stones   • Peyronie disease   • Vitamin D deficiency   • Class 2 obesity in adult   • Other viral warts   • Physical deconditioning   • History of stroke   • Antiphospholipid antibody with hypercoagulable state ([facility] [address] )   • Hyperbilirubinemia   • CORIE (obstructive sleep apnea)   • Anticoagulated on Coumadin   • Occasional tremors   • Weakness of both lower extremities   • Gastric polyps   • Gastric AVM   • Edema of both lower legs   • Non-recurrent bilateral inguinal hernia without obstruction or gangrene   • S/P laparoscopic hernia repair   • Low back pain   • Alkaline phosphatase elevation   • Right inguinal pain   • Ataxia   • CVA (cerebral vascular accident) ([place] [address] )   • PVD (peripheral vascular disease) ([place] [address] )   • Renal cyst   • Atypical chest pain   • Hypokalemia   • Dizziness   • Medical clearance for incarceration   • 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 attack   • Mild protein-calorie malnutrition ([place] [address] )   • Sinus arrhythmia   • Supratherapeutic INR   • Hyponatremia     Past Medical History:   Diagnosis Date   • Aneurysm of thoracic aorta ([place] [address] )     last assessed 11/20/17   • Anxiety     currently on no meds   • Arthritis    • Bilateral inguinal hernia    • Cardiac disease    • Cognitive impairment    • CVA (cerebral vascular accident) ([place] [address] )    • Depression    • GERD (gastroesophageal reflux disease)    • Hearing loss of aging    • Heart disease    • Hyperlipidemia    • Hypertension    • Irritable bowel syndrome    • Kidney stone    • Obesity    • Occasional tremors     left arm since stroke   • Palpitations    • Panic attack    • PONV (postoperative nausea and vomiting)     and headache x 3 days   • Psychiatric illness    • Sciatica     right and left  side   • Shortness of breath     on exertion   • Sleep apnea     is not using a CPAP   • Sleep difficulties    • Spitting up blood 05/21/2021    Resolved   • Status post placement of implantable loop recorder    • Stroke ([place] [address] ) 11/2014   • Stroke ([place] [address] ) 03/2021   • TIA (transient ischemic attack)      Past Surgical History:   Procedure Laterality Date   • AORTA SURGERY      thoracic - aneurysmorrhaphy   • APPENDECTOMY     • ASCENDING AORTIC ANEURYSM REPAIR      resolved 8/19/15   • CARDIAC LOOP RECORDER     • CHOLECYSTECTOMY      laparoscopic   • COLONOSCOPY     • CYSTOSCOPY      with removal of object- stent removal • KNEE ARTHROSCOPY Right 1996    with medial meniscus repair   • LITHOTRIPSY      renal   • ORTHOPEDIC SURGERY     • [facility] FRAGMENT KIDNEY STONE/ [facility]L Left 5/17/2018    Procedure: Laura Huang EXTRACORPORAL SHOCKWAVE (ESWL);   Surgeon: Nano Jo MD;  Location: [facility];  Service: Urology   • [address] Bilateral 12/9/2021    Procedure: ROBOTIC REPAIR HERNIA INGUINAL;  Surgeon: Maycol Barlow MD;  Location: [facility];  Service: General   • THUMB ARTHROSCOPY Right 1976    ligament repair   • UPPER GASTROINTESTINAL ENDOSCOPY       Social History   Social History     Substance and Sexual Activity   Alcohol Use Not Currently    Comment: very rare in past     Social History     Substance and Sexual Activity   Drug Use Never     Social History     Tobacco Use   Smoking Status Never Smoker   Smokeless Tobacco Never Used   Tobacco Comment    no second hand smoke exposure     Family History   Problem Relation Age of Onset   • Diverticulitis Mother         of colon   • Nephrolithiasis Mother    • Emphysema Father    • Nephrolithiasis Sister    • Heart attack Maternal Grandfather 72   • Breast cancer Paternal Grandmother    • Lung cancer Paternal Grandfather    • Cancer Family    • Coronary artery disease Family    • Diabetes Family         sibling   • Hypertension Family         sibling   • Hernia Family         sibling       Meds/Allergies   current meds:   Current Facility-Administered Medications   Medication Dose Route Frequency   • acetaminophen (TYLENOL) tablet 650 mg  650 mg Oral Q4H PRN   • acetaminophen (TYLENOL) tablet 650 mg  650 mg Oral Q4H PRN   • acetaminophen (TYLENOL) tablet 975 mg  975 mg Oral Q6H PRN   • aluminum-magnesium hydroxide-simethicone (MYLANTA) oral suspension 30 mL  30 mL Oral Q4H PRN   • amLODIPine (NORVASC) tablet 5 mg  5 mg Oral Daily   • ARIPiprazole (ABILIFY) tablet 10 mg  10 mg Oral Daily   • atorvastatin (LIPITOR) tablet 40 mg  40 mg Oral Daily With [employer] • benztropine (COGENTIN) injection 1 mg  1 mg Intramuscular Q4H PRN Max 6/day   • benztropine (COGENTIN) tablet 0 5 mg  0 5 mg Oral Q4H PRN Max 6/day   • carvedilol (COREG) tablet 37 5 mg  37 5 mg Oral BID With Meals   • famotidine (PEPCID) tablet 40 mg  40 mg Oral BID   • haloperidol lactate (HALDOL) injection 5 mg  5 mg Intramuscular Q4H PRN Max 4/day   • hydrOXYzine HCL (ATARAX) tablet 25 mg  25 mg Oral Q6H PRN Max 4/day   • LORazepam (ATIVAN) injection 1 mg  1 mg Intramuscular Q6H PRN Max 3/day   • LORazepam (ATIVAN) tablet 0 25 mg  0 25 mg Oral HS   • melatonin tablet 3 mg  3 mg Oral HS   • pantoprazole (PROTONIX) EC tablet 40 mg  40 mg Oral BID   • polyethylene glycol (MIRALAX) packet 17 g  17 g Oral Daily PRN   • potassium chloride (K-DUR,KLOR-CON) CR tablet 20 mEq  20 mEq Oral Daily   • propranolol (INDERAL) tablet 5 mg  5 mg Oral Q8H PRN   • risperiDONE (RisperDAL) tablet 0 25 mg  0 25 mg Oral Q4H PRN Max 6/day   • risperiDONE (RisperDAL) tablet 0 5 mg  0 5 mg Oral Q4H PRN Max 3/day   • risperiDONE (RisperDAL) tablet 1 mg  1 mg Oral Q2H PRN Max 3/day   • senna-docusate sodium (SENOKOT S) 8 6-50 mg per tablet 1 tablet  1 tablet Oral Daily PRN   • sodium chloride tablet 1 g  1 g Oral 4x Daily (with meals and at bedtime)   • traZODone (DESYREL) tablet 100 mg  100 mg Oral HS   • warfarin (COUMADIN) tablet 2 mg  2 mg Oral Daily (warfarin)       Allergies   Allergen Reactions   • Losartan Angioedema   • Aspirin Fatigue     Due to blood thinners     • Bactrim [Sulfamethoxazole-Trimethoprim]    • Eliquis [Apixaban] Other (See Comments)     Failed  Had embolic CVA   • Lisinopril      Felt bad, was OK with Zestril brand name     • Tramadol          Objective   BP (!) 183/91 (BP Location: Right arm)   Pulse 91   Temp 97 5 °F (36 4 °C) (Temporal)   Resp 19   Ht 5' 8" (1 727 m)   Wt 93 4 kg (206 lb)   SpO2 95%   BMI 31 32 kg/m²     Intake/Output Summary (Last 24 hours) at 10/14/2022 1102  Last data filed at 10/14/2022 0900  Gross per 24 hour   Intake 480 ml   Output --   Net 480 ml       Current Weight: Weight - Scale: 93 4 kg (206 lb)    Physical Exam  Constitutional:       Appearance: He is not ill-appearing  HENT:      Head: Normocephalic and atraumatic  Eyes:      General: No scleral icterus  Cardiovascular:      Rate and Rhythm: Normal rate and regular rhythm  Pulmonary:      Effort: Pulmonary effort is normal       Breath sounds: Normal breath sounds  Abdominal:      General: There is no distension  Palpations: Abdomen is soft  Musculoskeletal:         General: No deformity  Right lower leg: No edema  Left lower leg: No edema  Lymphadenopathy:      Cervical: No cervical adenopathy  Skin:     General: Skin is warm and dry  Findings: No rash  Neurological:      Mental Status: He is alert and oriented to person, place, and time        Comments: Noted tremor           Lab Results:        Results from last 7 days   Lab Units 10/13/22  1348   POTASSIUM mmol/L 4 0   CHLORIDE mmol/L 91*   CO2 mmol/L 29   BUN mg/dL 15   CREATININE mg/dL 0 97   CALCIUM mg/dL 8 8

## 2022-10-14 NOTE — PROGRESS NOTES
Progress Note - 2625 Neha Moses 58 y o  male MRN: [id number]  Unit/Bed#: [facility] 727-30 Encounter: [id number]    Assessment/Plan   Principal Problem:    SHIMON (generalized anxiety disorder)  Active Problems:    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 attack    Hypertension    GERD (gastroesophageal reflux disease)    Atrial fibrillation (HCC)    Class 2 obesity in adult    History of stroke    Antiphospholipid antibody with hypercoagulable state ([facility] )    Hyperbilirubinemia    CORIE (obstructive sleep apnea)    Occasional tremors    PVD (peripheral vascular disease) (HCC)    Medical clearance for incarceration    Mild protein-calorie malnutrition (HCC)    Sinus arrhythmia    Supratherapeutic INR    Hyponatremia    SIADH (syndrome of inappropriate ADH production) ([facility] )      Recommended Treatment:   No psychopharmacologic changes necessary at this moment the exception of restarting mirtazapine 7 5 mg for anxiety and restarting Ativan 0 5 mg t i d  For anxiety as we titrate mirtazapine; will continue to assess daily for further optimization  Nephrology on board, monitoring multifactorial SIADH closely, appreciate recs  Cardiology on board, appreciate recs  Endocrinology on board, appreciate recs  Peer to peer completed for patient and approved coverage for the weekend    Continue with pharmacotherapy, group therapy, milieu therapy and occupational therapy  Continue to assess for adverse medication side effects  Encourage Hans Miranda to participate in nonverbal forms of therapy including journaling and art/music therapy  Continue frequent safety checks and vitals per unit protocol  Continue to engage CM/SW to assist with collateral, disposition planning, and the implementation of an individualized, patient-centered plan of care    Continue medical management by medical team   Case discussed with treatment team     Legal Status: 201  ------------------------------------------------------------    Subjective: All documentation including nursing notes, medication history to ensure medication adherence on the unit, labs, and vitals were reviewed  Mariajose Jaramillo was evaluated this morning for continuity of care and no acute distress noted throughout the evaluation  Over the past 24 hours per nursing report, Mariajose Jaramillo has been cooperative on the unit and compliant with medications  Today, Mariajose Jaramillo is consenting for safety on the unit  Mariajose Jaramillo reports feeling "anxious " Mariajose Jaramillo notes having good sleep  Mariajose Jaramillo states having a light appetite  Mariajose Jaramillo has been taking the medications as prescribed and reporting no side effects  Patient was seen and examined today  We discussed his sodium levels and the need for monitoring and fluid restriction  Patient perseverative about fluid restriction and cramps in his feet  Patient developed chest "tightness" with left arm radiation during conversation  Suspect anxiety but ordered EKG and troponins  Patient worried he wouldn't be able to urinate but was able to shortly after to give sample  Needed redirection and reassurance continuously  Mariajose Jaramillo denies suicidal ideations  Mariajose Jaramillo denies homicidal ideations  Regarding hallucinations, Mariajose Jaramillo denies and does not appear internally stimulated    PRNs overnight: none   VS: Reviewed, within normal limits    Progress Toward Goals: improvement    Psychiatric Review of Systems:  Behavior over the last 24 hours:  unchanged  Sleep: normal  Appetite: decreased  Medication side effects: No   ROS: all other systems are negative    Vital signs in last 24 hours:  Temp:  [97 5 °F (36 4 °C)-97 7 °F (36 5 °C)] 97 5 °F (36 4 °C)  HR:  [79-93] 80  Resp:  [18-19] 19  BP: (123-183)/(68-97) 123/68    Laboratory results:  I have personally reviewed all pertinent laboratory/tests results    Recent Results (from the past 48 hour(s))   Bilirubin, direct    Collection Time: 10/13/22  6:53 AM   Result Value Ref Range    Bilirubin, Direct 0 00 0 00 - 0 20 mg/dL   Protime-INR    Collection Time: 10/13/22  6:53 AM   Result Value Ref Range    Protime 33 3 (H) 11 6 - 14 5 seconds    INR 3 20 (H) 0 84 - 1 19   Comprehensive metabolic panel    Collection Time: 10/13/22  6:57 AM   Result Value Ref Range    Sodium 127 (L) 135 - 147 mmol/L    Potassium 3 6 3 5 - 5 3 mmol/L    Chloride 92 (L) 96 - 108 mmol/L    CO2 29 21 - 32 mmol/L    ANION GAP 6 5 - 14 mmol/L    BUN 13 5 - 25 mg/dL    Creatinine 0 92 0 70 - 1 50 mg/dL    Glucose 99 70 - 99 mg/dL    Glucose, Fasting 99 70 - 99 mg/dL    Calcium 8 7 8 4 - 10 2 mg/dL    AST 27 17 - 59 U/L    ALT 26 <50 U/L    Alkaline Phosphatase 83 43 - 122 U/L    Total Protein 6 0 (L) 6 4 - 8 4 g/dL    Albumin 3 5 3 5 - 5 0 g/dL    Total Bilirubin 2 09 (H) 0 20 - 1 00 mg/dL    eGFR 88 ml/min/1 73sq m   Uric acid    Collection Time: 10/13/22  6:57 AM   Result Value Ref Range    Uric Acid 5 3 3 5 - 8 5 mg/dL   Osmolality, urine    Collection Time: 10/13/22 12:25 PM   Result Value Ref Range    Osmolality, Ur 333 250 - 900 mmol/KG   Sodium, urine, random    Collection Time: 10/13/22 12:25 PM   Result Value Ref Range    Sodium, Ur 32    Basic metabolic panel    Collection Time: 10/13/22  1:48 PM   Result Value Ref Range    Sodium 126 (L) 135 - 147 mmol/L    Potassium 4 0 3 5 - 5 3 mmol/L    Chloride 91 (L) 96 - 108 mmol/L    CO2 29 21 - 32 mmol/L    ANION GAP 6 5 - 14 mmol/L    BUN 15 5 - 25 mg/dL    Creatinine 0 97 0 70 - 1 50 mg/dL    Glucose 116 (H) 70 - 99 mg/dL    Calcium 8 8 8 4 - 10 2 mg/dL    eGFR 83 ml/min/1 73sq m   TSH, 3rd generation    Collection Time: 10/14/22  5:55 AM   Result Value Ref Range    TSH 3RD GENERATON 0 709 0 450 - 4 500 uIU/mL   Sodium    Collection Time: 10/14/22  5:55 AM   Result Value Ref Range    Sodium 126 (L) 135 - 147 mmol/L         Mental Status Evaluation:    Appearance:  casually dressed, marginal hygiene, looks older than stated age, overweight, bearded, overtly appearing  male, in no apparent acute distress, poor eye contact   Behavior:  cooperative, restless and fidgety   Speech:  normal rate and volume   Mood:  "anxious"   Affect:  constricted, anxious   Thought Process:  perseverative, negative thinking   Associations: circumstantial associations   Thought Content:  mild paranoia, negative thoughts, preoccupied   Perceptual Disturbances: Denies auditory or visual hallucinations and Does not appear to be responding to internal stimuli   Risk Potential: Suicidal ideation - None at present  Homicidal ideation - None at present  Potential for aggression - Not at present   Sensorium:  oriented to person, place and time/date   Memory:  recent and remote memory grossly intact   Consciousness:  alert and awake   Attention/Concentration: attention span and concentration are age appropriate   Insight:  partial   Judgment: partial   Gait/Station: uses walker   Motor Activity: no abnormal movements       Current Medications:  Current Facility-Administered Medications   Medication Dose Route Frequency Provider Last Rate   • acetaminophen  650 mg Oral Q4H PRN Clyde Tobin MD     • acetaminophen  650 mg Oral Q4H PRN Clyde Tobin MD     • acetaminophen  975 mg Oral Q6H PRN Clyde Tobin MD     • aluminum-magnesium hydroxide-simethicone  30 mL Oral Q4H PRN Clyde Tobin MD     • amLODIPine  5 mg Oral Daily Keily Espino PA-C     • ARIPiprazole  10 mg Oral Daily Jose Sen DO     • atorvastatin  40 mg Oral Daily With Wendy Gaytan MD     • benztropine  1 mg Intramuscular Q4H PRN Max 6/day Clyde Tobin MD     • benztropine  0 5 mg Oral Q4H PRN Max 6/day Clyde Tobin MD     • carvedilol  37 5 mg Oral BID With Meals Keily Espino PA-C     • famotidine  40 mg Oral BID Rosy Hu PA-C     • haloperidol lactate  5 mg Intramuscular Q4H PRN Max 4/day Clyde Tobin MD     • hydrOXYzine HCL  25 mg Oral Q6H PRN Max 4/day Chana Drew Ana Steiner MD     • LORazepam  1 mg Intramuscular Q6H PRN Max 3/day Tenzin Cabral MD     • LORazepam  0 25 mg Oral TID Shan Palencia DO     • melatonin  3 mg Oral HS Tenzin Cabral MD     • pantoprazole  40 mg Oral BID Najma Sommers PA-C     • polyethylene glycol  17 g Oral Daily PRN Tenzin Cabral MD     • potassium chloride  20 mEq Oral Daily Laura Nance MD     • propranolol  5 mg Oral Q8H PRN Tenzin Cabral MD     • risperiDONE  0 25 mg Oral Q4H PRN Max 6/day Laura Nance MD     • risperiDONE  0 5 mg Oral Q4H PRN Max 3/day Tenzin Cabral MD     • risperiDONE  1 mg Oral Q2H PRN Max 3/day Tenzin Cabral MD     • senna-docusate sodium  1 tablet Oral Daily PRN Tenzin Cabral MD     • sodium chloride  1 g Oral 4x Daily (with meals and at bedtime) Sloane Kirk DO     • traZODone  100 mg Oral HS Shan Palencia DO     • warfarin  2 mg Oral Daily (warfarin) Antonino Dunham PA-C         Suicide/Homicide Risk Assessment:  Risk of Harm to Self:   Based on today's assessment, Annette Human presents the following risk of harm to self: minimal    Risk of Harm to Others:  Based on today's assessment, Annette Human presents the following risk of harm to others: minimal    The following interventions are recommended: behavioral checks every 7 minutes, continued hospitalization on locked unit    Behavioral Health Medications: All current active meds have been reviewed  Changes as in plan section above  Risks, benefits and possible side effects of Medications:   Risks, benefits, and possible side effects of medications explained to patient and patient verbalizes understanding  Counseling / Coordination of Care:  Patient's progress discussed with staff in treatment team meeting  Medications, treatment progress and treatment plan reviewed with patient  Shan Palencia 10/14/22  Psychiatry Resident, PGY-II    This note was completed in part utilizing [employer] Direct Software   Grammatical, translation, syntax errors, random word insertions, spelling mistakes, and incomplete sentences may be an occasional consequence of this system secondary to software limitations with voice recognition, ambient noise, and hardware issues  If you have any questions or concerns about the content, text, or information contained within the body of this dictation, please contact the provider for clarification

## 2022-10-14 NOTE — CASE MANAGEMENT
10/14/22 0857   Team Meeting   Meeting Type Daily Rounds   Initial Conference Date 10/14/22   Team Members Present   Team Members Present Physician;Nurse;   Physician Team Member Dr Surekha Astorga, Dr James Mendez [facility] Naya   Patient/Family Present   Patient Present No   Patient's Family Present No     PT eval completed yesterday, attended group, visible, 4/4 anxiety, denies depression, showered, med compliant, PRN Atarax yesterday, sodium = 126, slept, renal consult

## 2022-10-14 NOTE — ASSESSMENT & PLAN NOTE
· Endocrinologist on board - repeat TFT along with TSI, thyroid antibodies  · Outpatient follow-up with endocrinologist and possible thyroid uptake scanin 4 weeks after discharge  · No need for antithyroid medications

## 2022-10-14 NOTE — ASSESSMENT & PLAN NOTE
· Hyponatremia improving  · Follow BMP Q 8h  · CT negative for evidence of malignancy  · Nephrology on board - appreciate recommendation  · Continue to hold diuretic, started salt tablets 1 g 4 times a day

## 2022-10-15 LAB
ANION GAP SERPL CALCULATED.3IONS-SCNC: 5 MMOL/L (ref 5–14)
ANION GAP SERPL CALCULATED.3IONS-SCNC: 5 MMOL/L (ref 5–14)
ANION GAP SERPL CALCULATED.3IONS-SCNC: 6 MMOL/L (ref 5–14)
ATRIAL RATE: 79 BPM
BUN SERPL-MCNC: 14 MG/DL (ref 5–25)
BUN SERPL-MCNC: 16 MG/DL (ref 5–25)
BUN SERPL-MCNC: 18 MG/DL (ref 5–25)
CALCIUM SERPL-MCNC: 8.4 MG/DL (ref 8.4–10.2)
CALCIUM SERPL-MCNC: 8.6 MG/DL (ref 8.4–10.2)
CALCIUM SERPL-MCNC: 8.8 MG/DL (ref 8.4–10.2)
CHLORIDE SERPL-SCNC: 93 MMOL/L (ref 96–108)
CHLORIDE SERPL-SCNC: 94 MMOL/L (ref 96–108)
CHLORIDE SERPL-SCNC: 95 MMOL/L (ref 96–108)
CO2 SERPL-SCNC: 28 MMOL/L (ref 21–32)
CO2 SERPL-SCNC: 30 MMOL/L (ref 21–32)
CO2 SERPL-SCNC: 30 MMOL/L (ref 21–32)
CORTIS SERPL-MCNC: 17.5 UG/DL
CREAT SERPL-MCNC: 0.96 MG/DL (ref 0.7–1.5)
CREAT SERPL-MCNC: 1 MG/DL (ref 0.7–1.5)
CREAT SERPL-MCNC: 1.09 MG/DL (ref 0.7–1.5)
GFR SERPL CREATININE-BSD FRML MDRD: 72 ML/MIN/1.73SQ M
GFR SERPL CREATININE-BSD FRML MDRD: 80 ML/MIN/1.73SQ M
GFR SERPL CREATININE-BSD FRML MDRD: 84 ML/MIN/1.73SQ M
GLUCOSE SERPL-MCNC: 107 MG/DL (ref 70–99)
GLUCOSE SERPL-MCNC: 110 MG/DL (ref 70–99)
GLUCOSE SERPL-MCNC: 117 MG/DL (ref 70–99)
P AXIS: 44 DEGREES
POTASSIUM SERPL-SCNC: 3.5 MMOL/L (ref 3.5–5.3)
POTASSIUM SERPL-SCNC: 3.9 MMOL/L (ref 3.5–5.3)
POTASSIUM SERPL-SCNC: 4 MMOL/L (ref 3.5–5.3)
PR INTERVAL: 146 MS
QRS AXIS: 14 DEGREES
QRSD INTERVAL: 90 MS
QT INTERVAL: 386 MS
QTC INTERVAL: 442 MS
SODIUM SERPL-SCNC: 127 MMOL/L (ref 135–147)
SODIUM SERPL-SCNC: 129 MMOL/L (ref 135–147)
SODIUM SERPL-SCNC: 130 MMOL/L (ref 135–147)
T WAVE AXIS: 20 DEGREES
THYROGLOB AB SERPL-ACNC: <1 IU/ML (ref 0–0.9)
THYROPEROXIDASE AB SERPL-ACNC: <8 IU/ML (ref 0–34)
VENTRICULAR RATE: 79 BPM

## 2022-10-15 PROCEDURE — 99232 SBSQ HOSP IP/OBS MODERATE 35: CPT | Performed by: NURSE PRACTITIONER

## 2022-10-15 PROCEDURE — 82533 TOTAL CORTISOL: CPT | Performed by: INTERNAL MEDICINE

## 2022-10-15 PROCEDURE — 80048 BASIC METABOLIC PNL TOTAL CA: CPT | Performed by: INTERNAL MEDICINE

## 2022-10-15 PROCEDURE — 93010 ELECTROCARDIOGRAM REPORT: CPT | Performed by: INTERNAL MEDICINE

## 2022-10-15 RX ORDER — LORAZEPAM 0.5 MG/1
0.5 TABLET ORAL EVERY 6 HOURS PRN
Status: DISCONTINUED | OUTPATIENT
Start: 2022-10-15 | End: 2022-10-26 | Stop reason: HOSPADM

## 2022-10-15 RX ADMIN — SODIUM CHLORIDE 1 G: 1 TABLET ORAL at 21:06

## 2022-10-15 RX ADMIN — LORAZEPAM 0.25 MG: 0.5 TABLET ORAL at 21:05

## 2022-10-15 RX ADMIN — MELATONIN TAB 3 MG 3 MG: 3 TAB at 21:06

## 2022-10-15 RX ADMIN — AMLODIPINE BESYLATE 5 MG: 5 TABLET ORAL at 08:51

## 2022-10-15 RX ADMIN — SENNOSIDES AND DOCUSATE SODIUM 1 TABLET: 50; 8.6 TABLET ORAL at 17:37

## 2022-10-15 RX ADMIN — FAMOTIDINE 40 MG: 20 TABLET ORAL at 08:50

## 2022-10-15 RX ADMIN — SODIUM CHLORIDE 1 G: 1 TABLET ORAL at 08:51

## 2022-10-15 RX ADMIN — PANTOPRAZOLE SODIUM 40 MG: 40 TABLET, DELAYED RELEASE ORAL at 08:51

## 2022-10-15 RX ADMIN — LORAZEPAM 0.25 MG: 0.5 TABLET ORAL at 08:51

## 2022-10-15 RX ADMIN — WARFARIN SODIUM 2 MG: 2 TABLET ORAL at 17:36

## 2022-10-15 RX ADMIN — POLYETHYLENE GLYCOL 3350 17 G: 17 POWDER, FOR SOLUTION ORAL at 23:29

## 2022-10-15 RX ADMIN — ARIPIPRAZOLE 10 MG: 10 TABLET ORAL at 08:51

## 2022-10-15 RX ADMIN — TRAZODONE HYDROCHLORIDE 100 MG: 100 TABLET ORAL at 21:06

## 2022-10-15 RX ADMIN — ATORVASTATIN CALCIUM 40 MG: 40 TABLET, FILM COATED ORAL at 17:36

## 2022-10-15 RX ADMIN — CARVEDILOL 37.5 MG: 12.5 TABLET, FILM COATED ORAL at 17:36

## 2022-10-15 RX ADMIN — FAMOTIDINE 40 MG: 20 TABLET ORAL at 17:36

## 2022-10-15 RX ADMIN — SODIUM CHLORIDE 1 G: 1 TABLET ORAL at 11:51

## 2022-10-15 RX ADMIN — SODIUM CHLORIDE 1 G: 1 TABLET ORAL at 17:36

## 2022-10-15 RX ADMIN — PANTOPRAZOLE SODIUM 40 MG: 40 TABLET, DELAYED RELEASE ORAL at 21:06

## 2022-10-15 RX ADMIN — CARVEDILOL 37.5 MG: 12.5 TABLET, FILM COATED ORAL at 08:51

## 2022-10-15 RX ADMIN — POTASSIUM CHLORIDE 20 MEQ: 1500 TABLET, EXTENDED RELEASE ORAL at 08:51

## 2022-10-15 RX ADMIN — LORAZEPAM 0.25 MG: 0.5 TABLET ORAL at 17:40

## 2022-10-15 RX ADMIN — LORAZEPAM 0.5 MG: 0.5 TABLET ORAL at 11:51

## 2022-10-15 NOTE — NURSING NOTE
Patient is cooperative  Social in milieu  Compliant with medication  He demonstrates and complains of severe anxiety  He is tremulous, stuttering, preservative in speech and expresses frequent concerns  He requested PRN lorazepam at 1151 which was effective in reducing anxiety

## 2022-10-15 NOTE — PLAN OF CARE
Problem: Ineffective Coping  Goal: Identifies ineffective coping skills  Outcome: Progressing  Goal: Identifies healthy coping skills  Outcome: Progressing  Goal: Demonstrates healthy coping skills  Outcome: Progressing  Goal: Patient/Family participate in treatment and DC plans  Description: Interventions:  - Provide therapeutic environment  Outcome: Progressing  Goal: Patient/Family verbalizes awareness of resources  Outcome: Progressing  Goal: Understands least restrictive measures  Description: Interventions:  - Utilize least restrictive behavior  Outcome: Progressing  Goal: Free from restraint events  Description: - Utilize least restrictive measures   - Provide behavioral interventions   - Redirect inappropriate behaviors   Outcome: Progressing     Problem: Anxiety  Goal: Anxiety is at manageable level  Description: Interventions:  - Assess and monitor patient's anxiety level  - Monitor for signs and symptoms (heart palpitations, chest pain, shortness of breath, headaches, nausea, feeling jumpy, restlessness, irritable, apprehensive)  - Collaborate with interdisciplinary team and initiate plan and interventions as ordered  - [place] patient to unit/surroundings  - Explain treatment plan  - Encourage participation in care  - Encourage verbalization of concerns/fears  - Identify coping mechanisms  - Assist in developing anxiety-reducing skills  - Administer/offer alternative therapies  - Limit or eliminate stimulants  Outcome: Progressing     Problem: Nutrition/Hydration-ADULT  Goal: Nutrient/Hydration intake appropriate for improving, restoring or maintaining nutritional needs  Description: Monitor and assess patient's nutrition/hydration status for malnutrition  Collaborate with interdisciplinary team and initiate plan and interventions as ordered  Monitor patient's weight and dietary intake as ordered or per policy  Utilize nutrition screening tool and intervene as necessary   Determine patient's food preferences and provide high-protein, high-caloric foods as appropriate       INTERVENTIONS:  - Monitor oral intake, urinary output, labs, and treatment plans  - Assess nutrition and hydration status and recommend course of action  - Evaluate amount of meals eaten  - Assist patient with eating if necessary   - Allow adequate time for meals  - Recommend/ encourage appropriate diets, oral nutritional supplements, and vitamin/mineral supplements  - Order, calculate, and assess calorie counts as needed  - Recommend, monitor, and adjust tube feedings and TPN/PPN based on assessed needs  - Assess need for intravenous fluids  - Provide specific nutrition/hydration education as appropriate  - Include patient/family/caregiver in decisions related to nutrition  Outcome: Progressing

## 2022-10-15 NOTE — NURSING NOTE
Patient was visible in the milieu sitting and dozing on and off  VSS  Denies all psych s/s  No complaints offered  Less B/L hand tremors noted  Compliant with HS medications  Safety checks ongoing

## 2022-10-15 NOTE — PROGRESS NOTES
Progress Note - 2625 Neha Moses 58 y o  male MRN: [id number]  Unit/Bed#: [facility] 340-65 Encounter: [id number]    Psychiatric Review of Systems:    Behavior over the last 24 hours: unchanged  Sleep: reported as stable  Appetite: reported as stable  Medication side effects: pt denies  ROS: no complaints, denies any shortness of breath or chest pain and all other systems are negative  Note: fluid restriction due to low NA ( 127), EKG WNL, cardiac enzymes WNL    Subjective: Patient was seen today for continuation of care, records reviewed and patient was discussed with the morning case review team   No behavioral outbursts or acute events noted overnight and no significant changes in behaviors or clinical status over the last 24 hours  Khari Jimenez was seen today while in his room  He appears anxious with racing worried thoughts  He says " I worry about everything "   Khari Jimenez does not appear overtly depressed  Nursing notes indicate that pt socializes with other patients in the activity room  Khari Jimenez denies suicidal ideation/intent/plan  Khari Jimenez also denies HI/AH/VH, does not voice any paranoia and delusional content, and does not appear overtly manic  Khari Jimenez states that he feels safe on the unit  No medication changes indicated at this time, continue current medication regimen      Mental Status Evaluation:    Appearance:  casually dressed, ambulates with a walker   Behavior:  restless   Speech:  stuttering   Mood:  anxious   Affect:  Mood congruent   Thought Process:  circumstantial   Thought Content:  ruminating thoughts, no overt paranoia noted on exam   Perceptual Disturbances: none   Risk Potential: Suicidal ideation - None, contracts for safety on the unit  Homicidal ideation - None  Potential for aggression - No   Memory:  recent memory impaired   Consciousness:  alert and awake   Attention: decreased concentration   Insight:  limited   Judgment: age appropriate   Gait/Station: uses walker   Motor Activity: L arm tremors s/p stroke      Progress Toward Goals: Unchanged  No significant changes in behaviors or clinical status over the last 24 hours  he will continue working on current treatment goals which include: 1  Continue with group therapy, milieu therapy and occupational therapy  2  Behavioral Health checks every 7 minutes  3  Continue frequent safety checks and vitals per unit protocol  4  Continue with SLIM medical management as indicated  5   Will review labs in the A M  6  The patient will be maintained on the following medications:     Current Facility-Administered Medications   Medication Dose Route Frequency Provider Last Rate   • acetaminophen  650 mg Oral Q4H PRN Dl Olivera MD     • acetaminophen  650 mg Oral Q4H PRN Dl Olivera MD     • acetaminophen  975 mg Oral Q6H PRN Dl Olivera MD     • aluminum-magnesium hydroxide-simethicone  30 mL Oral Q4H PRN Dl Olivera MD     • amLODIPine  5 mg Oral Daily Keily Espino PA-C     • ARIPiprazole  10 mg Oral Daily Amparo Nation DO     • atorvastatin  40 mg Oral Daily With Dinner Dl Olivera MD     • benztropine  1 mg Intramuscular Q4H PRN Max 6/day Dl Olivera MD     • benztropine  0 5 mg Oral Q4H PRN Max 6/day Laura Montoya MD     • carvedilol  37 5 mg Oral BID With Meals Keily Espino PA-C     • famotidine  40 mg Oral BID Rosangela Subramanian PA-C     • haloperidol lactate  5 mg Intramuscular Q4H PRN Max 4/day Dl Olivera MD     • hydrOXYzine HCL  25 mg Oral Q6H PRN Max 4/day Dl Olivera MD     • LORazepam  1 mg Intramuscular Q6H PRN Max 3/day Dl Olivera MD     • LORazepam  0 25 mg Oral TID Amparo Nation DO     • LORazepam  0 5 mg Oral Q6H PRN DONELL Cervantes     • melatonin  3 mg Oral HS Dl Olivera MD     • pantoprazole  40 mg Oral BID Rosangela Subramanian PA-C     • polyethylene glycol  17 g Oral Daily PRN Dl Olivera MD     • potassium chloride  20 mEq Oral Daily Laura Davis Roth MD     • propranolol  5 mg Oral Q8H PRN Katina Jean MD     • risperiDONE  0 25 mg Oral Q4H PRN Max 6/day Laura Roth MD     • risperiDONE  0 5 mg Oral Q4H PRN Max 3/day Katina Jean MD     • risperiDONE  1 mg Oral Q2H PRN Max 3/day Katina Jean MD     • senna-docusate sodium  1 tablet Oral Daily PRN Katina Jean MD     • sodium chloride  1 g Oral 4x Daily (with meals and at bedtime) Nate Virgen DO     • traZODone  100 mg Oral HS Santiago Vieira DO     • warfarin  2 mg Oral Daily (warfarin) Dante Love PA-C          Discharge Disposition: discharge and planning disposition remain ongoing    Vitals:  Vitals:    10/15/22 0722   BP: 119/58   Pulse: 103   Resp: 16   Temp: 98 °F (36 7 °C)   SpO2: 94%       Laboratory Results:    I have personally reviewed all pertinent laboratory/tests results    Most Recent Labs:   Lab Results   Component Value Date    WBC 7 50 09/30/2022    RBC 5 37 09/30/2022    HGB 16 4 09/30/2022    HCT 46 4 09/30/2022     09/30/2022    RDW 14 1 09/30/2022    NEUTROABS 5 96 09/30/2022    SODIUM 129 (L) 10/15/2022    K 3 9 10/15/2022    CL 93 (L) 10/15/2022    CO2 30 10/15/2022    BUN 14 10/15/2022    CREATININE 0 96 10/15/2022    GLUC 117 (H) 10/15/2022    GLUF 99 10/13/2022    CALCIUM 8 8 10/15/2022    AST 27 10/13/2022    ALT 26 10/13/2022    ALKPHOS 83 10/13/2022    TP 6 0 (L) 10/13/2022    ALB 3 5 10/13/2022    TBILI 2 09 (H) 10/13/2022    CHOLESTEROL 120 09/30/2022    HDL 34 (L) 09/30/2022    TRIG 90 09/30/2022    LDLCALC 68 09/30/2022    NONHDLC 86 09/30/2022    RZD4GVMREFAJ 0 709 10/14/2022    FREET4 1 55 (H) 10/14/2022    T3FREE 2 9 12/08/2014    RPR Non-Reactive (q 11/01/2014    HGBA1C 5 3 05/02/2021     05/02/2021         SHIMON (generalized anxiety disorder)        Assessment/Plan   Principal Problem:    SHIMON (generalized anxiety disorder)  Active Problems:    Hypertension    GERD (gastroesophageal reflux disease)    Atrial fibrillation ([facility] 75 )    Class 2 obesity in adult    History of stroke    Antiphospholipid antibody with hypercoagulable state ([facility] 75 )    Hyperbilirubinemia    CORIE (obstructive sleep apnea)    Occasional tremors    PVD (peripheral vascular disease) ([facility])    Medical clearance for incarceration    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 attack    Mild protein-calorie malnutrition ([facility])    Sinus arrhythmia    Supratherapeutic INR    Hyponatremia    SIADH (syndrome of inappropriate ADH production) ([facility])    Subclinical hyperthyroidism

## 2022-10-15 NOTE — NURSING NOTE
Patient is calm and cooperative  He demonstrates anxiety but is improved in general appearance from yesterday  He is social in milieu

## 2022-10-15 NOTE — QUICK NOTE
Chart reviewed  Sodium improving appropriately to 129     Urine sodium 16, urine osm 519     Continue sodium chloride 1 g q i d  and 1500cc/day oral fluid restriction   Continue to hold diuretics and zoloft   Check am BMP

## 2022-10-15 NOTE — NURSING NOTE
Pt lab results received from midnight draw  Pt sodium level 127mmol/L  Directive to report to provider if Na drops below 126 or rises above 132  Next scheduled BMP 08:00

## 2022-10-16 LAB
ANION GAP SERPL CALCULATED.3IONS-SCNC: 2 MMOL/L (ref 5–14)
ANION GAP SERPL CALCULATED.3IONS-SCNC: 6 MMOL/L (ref 5–14)
BUN SERPL-MCNC: 14 MG/DL (ref 5–25)
BUN SERPL-MCNC: 16 MG/DL (ref 5–25)
CALCIUM SERPL-MCNC: 8.1 MG/DL (ref 8.4–10.2)
CALCIUM SERPL-MCNC: 9.1 MG/DL (ref 8.4–10.2)
CHLORIDE SERPL-SCNC: 96 MMOL/L (ref 96–108)
CHLORIDE SERPL-SCNC: 98 MMOL/L (ref 96–108)
CO2 SERPL-SCNC: 29 MMOL/L (ref 21–32)
CO2 SERPL-SCNC: 32 MMOL/L (ref 21–32)
CREAT SERPL-MCNC: 0.98 MG/DL (ref 0.7–1.5)
CREAT SERPL-MCNC: 1.06 MG/DL (ref 0.7–1.5)
GFR SERPL CREATININE-BSD FRML MDRD: 74 ML/MIN/1.73SQ M
GFR SERPL CREATININE-BSD FRML MDRD: 82 ML/MIN/1.73SQ M
GLUCOSE SERPL-MCNC: 115 MG/DL (ref 70–99)
GLUCOSE SERPL-MCNC: 128 MG/DL (ref 70–99)
POTASSIUM SERPL-SCNC: 3.6 MMOL/L (ref 3.5–5.3)
POTASSIUM SERPL-SCNC: 4 MMOL/L (ref 3.5–5.3)
SODIUM SERPL-SCNC: 129 MMOL/L (ref 135–147)
SODIUM SERPL-SCNC: 134 MMOL/L (ref 135–147)

## 2022-10-16 PROCEDURE — 80048 BASIC METABOLIC PNL TOTAL CA: CPT | Performed by: INTERNAL MEDICINE

## 2022-10-16 PROCEDURE — 99232 SBSQ HOSP IP/OBS MODERATE 35: CPT | Performed by: NURSE PRACTITIONER

## 2022-10-16 RX ORDER — DOCUSATE SODIUM 100 MG/1
100 CAPSULE, LIQUID FILLED ORAL
Status: DISCONTINUED | OUTPATIENT
Start: 2022-10-16 | End: 2022-10-26 | Stop reason: HOSPADM

## 2022-10-16 RX ORDER — SODIUM CHLORIDE 1000 MG
1 TABLET, SOLUBLE MISCELLANEOUS
Status: DISCONTINUED | OUTPATIENT
Start: 2022-10-16 | End: 2022-10-19

## 2022-10-16 RX ADMIN — LORAZEPAM 0.25 MG: 0.5 TABLET ORAL at 09:17

## 2022-10-16 RX ADMIN — DOCUSATE SODIUM 100 MG: 100 CAPSULE, LIQUID FILLED ORAL at 21:09

## 2022-10-16 RX ADMIN — TRAZODONE HYDROCHLORIDE 100 MG: 100 TABLET ORAL at 21:09

## 2022-10-16 RX ADMIN — SODIUM CHLORIDE 1 G: 1 TABLET ORAL at 13:03

## 2022-10-16 RX ADMIN — WARFARIN SODIUM 2 MG: 2 TABLET ORAL at 17:12

## 2022-10-16 RX ADMIN — FAMOTIDINE 40 MG: 20 TABLET ORAL at 09:17

## 2022-10-16 RX ADMIN — CARVEDILOL 37.5 MG: 12.5 TABLET, FILM COATED ORAL at 17:12

## 2022-10-16 RX ADMIN — POLYETHYLENE GLYCOL 3350 17 G: 17 POWDER, FOR SOLUTION ORAL at 09:22

## 2022-10-16 RX ADMIN — SODIUM CHLORIDE 1 G: 1 TABLET ORAL at 17:12

## 2022-10-16 RX ADMIN — FAMOTIDINE 40 MG: 20 TABLET ORAL at 17:13

## 2022-10-16 RX ADMIN — MELATONIN TAB 3 MG 3 MG: 3 TAB at 21:09

## 2022-10-16 RX ADMIN — ARIPIPRAZOLE 10 MG: 10 TABLET ORAL at 09:17

## 2022-10-16 RX ADMIN — POTASSIUM CHLORIDE 20 MEQ: 1500 TABLET, EXTENDED RELEASE ORAL at 09:17

## 2022-10-16 RX ADMIN — LORAZEPAM 0.25 MG: 0.5 TABLET ORAL at 21:09

## 2022-10-16 RX ADMIN — AMLODIPINE BESYLATE 5 MG: 5 TABLET ORAL at 09:17

## 2022-10-16 RX ADMIN — SENNOSIDES AND DOCUSATE SODIUM 1 TABLET: 50; 8.6 TABLET ORAL at 09:19

## 2022-10-16 RX ADMIN — LORAZEPAM 0.25 MG: 0.5 TABLET ORAL at 17:11

## 2022-10-16 RX ADMIN — SODIUM CHLORIDE 1 G: 1 TABLET ORAL at 09:19

## 2022-10-16 RX ADMIN — ATORVASTATIN CALCIUM 40 MG: 40 TABLET, FILM COATED ORAL at 17:11

## 2022-10-16 RX ADMIN — CARVEDILOL 37.5 MG: 12.5 TABLET, FILM COATED ORAL at 09:19

## 2022-10-16 RX ADMIN — PANTOPRAZOLE SODIUM 40 MG: 40 TABLET, DELAYED RELEASE ORAL at 09:17

## 2022-10-16 RX ADMIN — PANTOPRAZOLE SODIUM 40 MG: 40 TABLET, DELAYED RELEASE ORAL at 21:09

## 2022-10-16 NOTE — NURSING NOTE
Patient was visible in the milieu with minimal peer interaction  VSS  Rate anxiety 2/4, depression 5/10, denies all other psych s/s  No complaints offered  No behavioral issues noted  Minimal B/L hand tremors noted  Compliant with HS medications  Miralax given at 2329  Safety checks ongoing

## 2022-10-16 NOTE — QUICK NOTE
Chart reviewed  Sodium improved to 134 today  Can decrease sodium chloride to 1 g t i d , continue oral fluid restriction, continue to hold diuretics and Zoloft     Check am BMP

## 2022-10-16 NOTE — PROGRESS NOTES
Progress Note - 2625 Neha Moses 58 y o  male MRN: [id number]  Unit/Bed#: [facility] 575-72 Encounter: [id number]    Psychiatric Review of Systems:    Behavior over the last 24 hours: unchanged  Sleep: reported as stable  Appetite: reported as stable  Medication side effects: pt denies  ROS: no complaints, denies any shortness of breath or chest pain and all other systems are negative  Note: fluid restriction due to low Na level  Na levels improving; NA level this am is (134), EKG WNL, cardiac enzymes WNL    Subjective: Patient was seen today for continuation of care, records reviewed and patient was discussed with the morning case review team   No behavioral outbursts or acute events noted overnight and no significant changes in behaviors or clinical status over the last 24 hours  Katie Do was seen today while in his room  Ambulates with a walker  L arm tremor s/p stroke  Nursing staff reports decreased anxiety intensity levels since pt started taking Ativan prn  Pt himself reports good appetite and good sleep  He socializes well with other patients on the unit  Colace 100 mg hs prescribed today bc nursing staff reports pt's last BM was 5 days ago  Katie Do does not appear overtly depressed  Katie Do denies suicidal ideation/intent/plan  Katie Do also denies HI/AH/VH, does not voice any paranoia and delusional content, and does not appear overtly manic  Katie Do states that he feels safe on the unit  Colace 100 mg hs prescribed today bc nursing staff reports pt's last BM was 5 days ago       Mental Status Evaluation:    Appearance:  casually dressed, ambulates with a walker   Behavior:  More calm   Speech:  stuttering   Mood:  Improved, less anxious   Affect:  Mood congruent   Thought Process:  circumstantial   Thought Content:  ruminating thoughts, no overt paranoia noted on exam   Perceptual Disturbances: none   Risk Potential: Suicidal ideation - None, contracts for safety on the unit  Homicidal ideation - None  Potential for aggression - No   Memory:  recent memory impaired   Consciousness:  alert and awake   Attention: decreased concentration   Insight:  limited   Judgment: age appropriate   Gait/Station: uses walker   Motor Activity: L arm tremors s/p stroke      Progress Toward Goals: Unchanged  No significant changes in behaviors or clinical status over the last 24 hours  he will continue working on current treatment goals which include: 1  Continue with group therapy, milieu therapy and occupational therapy  2  Behavioral Health checks every 7 minutes  3  Continue frequent safety checks and vitals per unit protocol  4  Continue with SLIM medical management as indicated  5   Will review labs in the A M  6  The patient will be maintained on the following medications:     Current Facility-Administered Medications   Medication Dose Route Frequency Provider Last Rate   • acetaminophen  650 mg Oral Q4H PRN Chelle Andersen MD     • acetaminophen  650 mg Oral Q4H PRN Chelle Andersen MD     • acetaminophen  975 mg Oral Q6H PRN Chelle Andersen MD     • aluminum-magnesium hydroxide-simethicone  30 mL Oral Q4H PRN Chelle Andersen MD     • amLODIPine  5 mg Oral Daily Keily Espino PA-C     • ARIPiprazole  10 mg Oral Daily Melita Muniz DO     • atorvastatin  40 mg Oral Daily With Dinner Chelle Andersen MD     • benztropine  1 mg Intramuscular Q4H PRN Max 6/day Chelle Andersen MD     • benztropine  0 5 mg Oral Q4H PRN Max 6/day Laura Mullins MD     • carvedilol  37 5 mg Oral BID With Meals Keily Espino PA-C     • docusate sodium  100 mg Oral HS DONELL Foreman     • famotidine  40 mg Oral BID Fransisco Mosher PA-C     • haloperidol lactate  5 mg Intramuscular Q4H PRN Max 4/day Chelle Andersen MD     • hydrOXYzine HCL  25 mg Oral Q6H PRN Max 4/day Chelle Andersen MD     • LORazepam  1 mg Intramuscular Q6H PRN Max 3/day Chelle Andersen MD     • LORazepam  0 25 mg Oral TID Melita Muniz DO • LORazepam  0 5 mg Oral Q6H PRN DONELL Tineo     • melatonin  3 mg Oral HS Jose Helms MD     • pantoprazole  40 mg Oral BID Tammy Jose PA-C     • polyethylene glycol  17 g Oral Daily PRN Jose Helms MD     • potassium chloride  20 mEq Oral Daily Laura Zavala MD     • propranolol  5 mg Oral Q8H PRN Jose Helms MD     • risperiDONE  0 25 mg Oral Q4H PRN Max 6/day Laura Zavala MD     • risperiDONE  0 5 mg Oral Q4H PRN Max 3/day Jose Helms MD     • risperiDONE  1 mg Oral Q2H PRN Max 3/day Jose Helms MD     • senna-docusate sodium  1 tablet Oral Daily PRN Jose Helms MD     • sodium chloride  1 g Oral TID With Meals Nighat Tse PA-C     • traZODone  100 mg Oral HS Chandler Fernandes DO     • warfarin  2 mg Oral Daily (warfarin) Georges Streeter PA-C          Discharge Disposition: discharge and planning disposition remain ongoing    Vitals:  Vitals:    10/16/22 0741   BP: 152/92   Pulse: 67   Resp:    Temp: 98 °F (36 7 °C)   SpO2: 97%       Laboratory Results:    I have personally reviewed all pertinent laboratory/tests results    Most Recent Labs:   Lab Results   Component Value Date    WBC 7 50 09/30/2022    RBC 5 37 09/30/2022    HGB 16 4 09/30/2022    HCT 46 4 09/30/2022     09/30/2022    RDW 14 1 09/30/2022    NEUTROABS 5 96 09/30/2022    SODIUM 134 (L) 10/16/2022    K 4 0 10/16/2022    CL 96 10/16/2022    CO2 32 10/16/2022    BUN 14 10/16/2022    CREATININE 1 06 10/16/2022    GLUC 128 (H) 10/16/2022    GLUF 99 10/13/2022    CALCIUM 9 1 10/16/2022    AST 27 10/13/2022    ALT 26 10/13/2022    ALKPHOS 83 10/13/2022    TP 6 0 (L) 10/13/2022    ALB 3 5 10/13/2022    TBILI 2 09 (H) 10/13/2022    CHOLESTEROL 120 09/30/2022    HDL 34 (L) 09/30/2022    TRIG 90 09/30/2022    LDLCALC 68 09/30/2022    NONHDLC 86 09/30/2022    [id number] 0 709 10/14/2022    FREET4 1 55 (H) 10/14/2022    T3FREE 2 9 12/08/2014    RPR Non-Reactive (q 11/01/2014    HGBA1C 5 3 05/02/2021     05/02/2021         SHIMON (generalized anxiety disorder)        Assessment/Plan   Principal Problem:    SHIMON (generalized anxiety disorder)  Active Problems:    Hypertension    GERD (gastroesophageal reflux disease)    Atrial fibrillation (HCC)    Class 2 obesity in adult    History of stroke    Antiphospholipid antibody with hypercoagulable state ([facility] )    Hyperbilirubinemia    CORIE (obstructive sleep apnea)    Occasional tremors    PVD (peripheral vascular disease) ([facility])    Medical clearance for incarceration    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 attack    Mild protein-calorie malnutrition ([facility])    Sinus arrhythmia    Supratherapeutic INR    Hyponatremia    SIADH (syndrome of inappropriate ADH production) ([facility] )    Subclinical hyperthyroidism

## 2022-10-17 LAB
ANION GAP SERPL CALCULATED.3IONS-SCNC: 6 MMOL/L (ref 5–14)
BUN SERPL-MCNC: 12 MG/DL (ref 5–25)
CALCIUM SERPL-MCNC: 8.3 MG/DL (ref 8.4–10.2)
CHLORIDE SERPL-SCNC: 99 MMOL/L (ref 96–108)
CO2 SERPL-SCNC: 27 MMOL/L (ref 21–32)
CREAT SERPL-MCNC: 0.92 MG/DL (ref 0.7–1.5)
GFR SERPL CREATININE-BSD FRML MDRD: 88 ML/MIN/1.73SQ M
GLUCOSE P FAST SERPL-MCNC: 119 MG/DL (ref 70–99)
GLUCOSE SERPL-MCNC: 119 MG/DL (ref 70–99)
INR PPP: 1.49 (ref 0.84–1.19)
POTASSIUM SERPL-SCNC: 3.9 MMOL/L (ref 3.5–5.3)
PROTHROMBIN TIME: 18.4 SECONDS (ref 11.6–14.5)
SODIUM SERPL-SCNC: 132 MMOL/L (ref 135–147)
TSI SER-ACNC: <0.1 IU/L (ref 0–0.55)

## 2022-10-17 PROCEDURE — 99233 SBSQ HOSP IP/OBS HIGH 50: CPT | Performed by: INTERNAL MEDICINE

## 2022-10-17 PROCEDURE — 99232 SBSQ HOSP IP/OBS MODERATE 35: CPT | Performed by: STUDENT IN AN ORGANIZED HEALTH CARE EDUCATION/TRAINING PROGRAM

## 2022-10-17 PROCEDURE — 80048 BASIC METABOLIC PNL TOTAL CA: CPT | Performed by: PHYSICIAN ASSISTANT

## 2022-10-17 PROCEDURE — 85610 PROTHROMBIN TIME: CPT | Performed by: INTERNAL MEDICINE

## 2022-10-17 RX ORDER — MIRTAZAPINE 15 MG/1
15 TABLET, FILM COATED ORAL
Status: DISCONTINUED | OUTPATIENT
Start: 2022-10-17 | End: 2022-10-19

## 2022-10-17 RX ORDER — TRAZODONE HYDROCHLORIDE 100 MG/1
100 TABLET ORAL
Status: DISCONTINUED | OUTPATIENT
Start: 2022-10-17 | End: 2022-10-26 | Stop reason: HOSPADM

## 2022-10-17 RX ORDER — WARFARIN SODIUM 5 MG/1
2.5 TABLET ORAL
Status: DISCONTINUED | OUTPATIENT
Start: 2022-10-17 | End: 2022-10-17

## 2022-10-17 RX ORDER — WARFARIN SODIUM 6 MG/1
3 TABLET ORAL
Status: DISCONTINUED | OUTPATIENT
Start: 2022-10-17 | End: 2022-10-17

## 2022-10-17 RX ORDER — WARFARIN SODIUM 2 MG/1
4 TABLET ORAL
Status: DISCONTINUED | OUTPATIENT
Start: 2022-10-17 | End: 2022-10-26 | Stop reason: HOSPADM

## 2022-10-17 RX ADMIN — SODIUM CHLORIDE 1 G: 1 TABLET ORAL at 18:14

## 2022-10-17 RX ADMIN — LORAZEPAM 0.25 MG: 0.5 TABLET ORAL at 18:19

## 2022-10-17 RX ADMIN — MIRTAZAPINE 15 MG: 15 TABLET, FILM COATED ORAL at 21:04

## 2022-10-17 RX ADMIN — AMLODIPINE BESYLATE 5 MG: 5 TABLET ORAL at 08:03

## 2022-10-17 RX ADMIN — FAMOTIDINE 40 MG: 20 TABLET ORAL at 18:14

## 2022-10-17 RX ADMIN — CARVEDILOL 37.5 MG: 12.5 TABLET, FILM COATED ORAL at 08:00

## 2022-10-17 RX ADMIN — ATORVASTATIN CALCIUM 40 MG: 40 TABLET, FILM COATED ORAL at 18:14

## 2022-10-17 RX ADMIN — WARFARIN SODIUM 4 MG: 2 TABLET ORAL at 18:19

## 2022-10-17 RX ADMIN — ARIPIPRAZOLE 10 MG: 10 TABLET ORAL at 08:01

## 2022-10-17 RX ADMIN — LORAZEPAM 0.25 MG: 0.5 TABLET ORAL at 08:01

## 2022-10-17 RX ADMIN — POTASSIUM CHLORIDE 20 MEQ: 1500 TABLET, EXTENDED RELEASE ORAL at 08:00

## 2022-10-17 RX ADMIN — DOCUSATE SODIUM 100 MG: 100 CAPSULE, LIQUID FILLED ORAL at 21:04

## 2022-10-17 RX ADMIN — FAMOTIDINE 40 MG: 20 TABLET ORAL at 08:01

## 2022-10-17 RX ADMIN — SODIUM CHLORIDE 1 G: 1 TABLET ORAL at 08:01

## 2022-10-17 RX ADMIN — SODIUM CHLORIDE 1 G: 1 TABLET ORAL at 12:44

## 2022-10-17 RX ADMIN — PANTOPRAZOLE SODIUM 40 MG: 40 TABLET, DELAYED RELEASE ORAL at 21:04

## 2022-10-17 RX ADMIN — CARVEDILOL 37.5 MG: 12.5 TABLET, FILM COATED ORAL at 18:14

## 2022-10-17 RX ADMIN — PANTOPRAZOLE SODIUM 40 MG: 40 TABLET, DELAYED RELEASE ORAL at 08:01

## 2022-10-17 RX ADMIN — MELATONIN TAB 3 MG 3 MG: 3 TAB at 21:04

## 2022-10-17 RX ADMIN — LORAZEPAM 0.25 MG: 0.5 TABLET ORAL at 21:04

## 2022-10-17 RX ADMIN — TRAZODONE HYDROCHLORIDE 100 MG: 100 TABLET ORAL at 21:20

## 2022-10-17 NOTE — PROGRESS NOTES
NEPHROLOGY PROGRESS NOTE   Bhavna Fried 58 y o  male MRN: [id number]  Unit/Bed#: [facility] 939-05 Encounter: [id number]        ASSESSMENT & PLAN    1  Hyponatremia-acute on chronic likely multifactorial with underlying SIADH  o Patient's salt tablets were down titrated to 1 g t i d  And sodium decreased to 132  o Patient is having a difficult time with salt tablets and fluid restriction as yes at take 14 pills  o At this point will try to maintain serum sodium above 132  o Work up: With a high urine osmolality normal urine sodium, a m  Cortisol was not low, TSH was acceptable, CT of the abdomen and pelvis along with CTA of the chest this year showed no evidence of malignancy  o The patient is on trazodone, pantoprazole high-dose, Abilify, all of which could contribute to hyponatremia  o Patient is now off Zoloft  o Continue salt tablets, continue fluid restriction of 1 5 L  o Check NAVOS Monday Wednesday Friday     2  Hypokalemia  o Most recent potassiums have been 3 9-4 0  o Currently not on any diuretics  o Complaining of pill burden will do a trial without the potassium chloride which will likely help improve his fluid restriction  o Repeat BMP in 2 days    3  Primary hypertension  o Blood pressure is 116/66 to 140/70  o On amlodipine 5 mg daily, carvedilol 37 5 mg 2 times daily  o Will monitor low threshold to discontinue or decrease amlodipine     4  Grade 1 diastolic dysfunction with an EF of 63% with bicuspid aortic stenosis  o Currently on beta-blocker not on ACE-inhibitor not on a loop diuretic  o History of an aortic aneurysm with hemiarch reconstruction in 2014    5  Paroxysmal atrial fibrillation  o Currently on Coumadin    6   Hypercoagulable state with a history of antiphospholipid syndrome    SUBJECTIVE:    Patient was seen today  Complaining about fluid restriction and salt tablets  Complaining about pill burden  Urinating okay  No shortness of breath    12 point review of systems was otherwise negative besides what is mentioned above      Medications:    Current Facility-Administered Medications:   •  acetaminophen (TYLENOL) tablet 650 mg, 650 mg, Oral, Q4H PRN, Jose Helms MD, 650 mg at 10/03/22 2150  •  acetaminophen (TYLENOL) tablet 650 mg, 650 mg, Oral, Q4H PRN, Jose Helms MD, 650 mg at 10/11/22 1537  •  acetaminophen (TYLENOL) tablet 975 mg, 975 mg, Oral, Q6H PRN, Jose Helms MD  •  aluminum-magnesium hydroxide-simethicone (MYLANTA) oral suspension 30 mL, 30 mL, Oral, Q4H PRN, Jose Helms MD  •  amLODIPine (NORVASC) tablet 5 mg, 5 mg, Oral, Daily, Keily Espino PA-C, 5 mg at 10/17/22 0803  •  ARIPiprazole (ABILIFY) tablet 10 mg, 10 mg, Oral, Daily, Chandler Fernandes DO, 10 mg at 10/17/22 0801  •  atorvastatin (LIPITOR) tablet 40 mg, 40 mg, Oral, Daily With Santos Stewart MD, 40 mg at 10/16/22 1711  •  benztropine (COGENTIN) injection 1 mg, 1 mg, Intramuscular, Q4H PRN Max 6/day, aLura Zavala MD  •  benztropine (COGENTIN) tablet 0 5 mg, 0 5 mg, Oral, Q4H PRN Max 6/day, Jose Helms MD  •  carvedilol (COREG) tablet 37 5 mg, 37 5 mg, Oral, BID With Meals, Keily Espino PA-C, 37 5 mg at 10/17/22 0800  •  docusate sodium (COLACE) capsule 100 mg, 100 mg, Oral, HS, DONELL Argueta, 100 mg at 10/16/22 2109  •  famotidine (PEPCID) tablet 40 mg, 40 mg, Oral, BID, Yvette Heck PA-C, 40 mg at 10/17/22 0801  •  haloperidol lactate (HALDOL) injection 5 mg, 5 mg, Intramuscular, Q4H PRN Max 4/day, Laura Zavala MD  •  hydrOXYzine HCL (ATARAX) tablet 25 mg, 25 mg, Oral, Q6H PRN Max 4/day, Jose Helms MD, 25 mg at 10/13/22 1555  •  LORazepam (ATIVAN) injection 1 mg, 1 mg, Intramuscular, Q6H PRN Max 3/day, Jose Helms MD  •  LORazepam (ATIVAN) tablet 0 25 mg, 0 25 mg, Oral, TID, Chandler Fernandes DO, 0 25 mg at 10/17/22 0801  •  LORazepam (ATIVAN) tablet 0 5 mg, 0 5 mg, Oral, Q6H PRN, DONELL Tineo, 0 5 mg at 10/15/22 1151  •  melatonin tablet 3 mg, 3 mg, Oral, HS, Yusuf Lucas MD, 3 mg at 10/16/22 2109  •  pantoprazole (PROTONIX) EC tablet 40 mg, 40 mg, Oral, BID, Yvette Heck PA-C, 40 mg at 10/17/22 0801  •  polyethylene glycol (MIRALAX) packet 17 g, 17 g, Oral, Daily PRN, Yusuf Lucas MD, 17 g at 10/16/22 8901  •  potassium chloride (K-DUR,KLOR-CON) CR tablet 20 mEq, 20 mEq, Oral, Daily, Yusuf Lucas MD, 20 mEq at 10/17/22 0800  •  propranolol (INDERAL) tablet 5 mg, 5 mg, Oral, Q8H PRN, Yusuf Lucas MD, 5 mg at 10/10/22 1226  •  risperiDONE (RisperDAL) tablet 0 25 mg, 0 25 mg, Oral, Q4H PRN Max 6/day, Laura Melgoza MD, 0 25 mg at 10/11/22 1801  •  risperiDONE (RisperDAL) tablet 0 5 mg, 0 5 mg, Oral, Q4H PRN Max 3/day, Yusuf Lucas MD  •  risperiDONE (RisperDAL) tablet 1 mg, 1 mg, Oral, Q2H PRN Max 3/day, Yusuf Lucas MD  •  senna-docusate sodium (SENOKOT S) 8 6-50 mg per tablet 1 tablet, 1 tablet, Oral, Daily PRN, Yusuf Lucas MD, 1 tablet at 10/16/22 3411  •  sodium chloride tablet 1 g, 1 g, Oral, TID With Meals, Evelin Mccoy PA-C, 1 g at 10/17/22 0801  •  traZODone (DESYREL) tablet 100 mg, 100 mg, Oral, HS, Monse Montoya DO, 100 mg at 10/16/22 2109  •  warfarin (COUMADIN) tablet 2 mg, 2 mg, Oral, Daily (warfarin), Keily Espino PA-C, 2 mg at 10/16/22 1712    OBJECTIVE:    Vitals:    10/16/22 0741 10/16/22 1524 10/16/22 2032 10/17/22 0722   BP: 152/92 138/78 116/66 128/63   BP Location: Right arm Left arm Right arm Right arm   Pulse: 67 80 85 88   Resp:  16 16    Temp: 98 °F (36 7 °C) 98 5 °F (36 9 °C) 97 9 °F (36 6 °C) 97 8 °F (36 6 °C)   TempSrc: Temporal Temporal Temporal Temporal   SpO2: 97% 97% 97% 98%   Weight:       Height:            Temp:  [97 8 °F (36 6 °C)-98 5 °F (36 9 °C)] 97 8 °F (36 6 °C)  HR:  [80-88] 88  Resp:  [16] 16  BP: (116-138)/(63-78) 128/63  SpO2:  [97 %-98 %] 98 %     Body mass index is 31 32 kg/m²      Weight (last 2 days)     None          I/O last 3 completed shifts: In: 600 [P O :600]  Out: -     I/O this shift:  In: 120 [P O :120]  Out: -       Physical exam:    General: no acute distress, cooperative  Eyes: conjunctivae pink, anicteric sclerae  ENT: lips and mucous membranes moist, no exudates, normal external ears  Neck: ROM intact, no JVD  Chest: No respiratory distress, no accessory muscle use  CVS: normal rate, non pericardial friction rub  Abdomen: soft, non-tender, non-distended, normoactive bowel sounds  Extremities: no edema of both legs  Skin: no rash  Neuro: awake, alert, oriented, grossly intact  Psych:  Pleasant affect    Invasive Devices:      Lab Results:   Results from last 7 days   Lab Units 10/17/22  0515 10/16/22  0956 10/16/22  0006 10/15/22  1619 10/15/22  1027 10/13/22  1348 10/13/22  0657   POTASSIUM mmol/L 3 9 4 0 3 6 4 0 3 9   < > 3 6   CHLORIDE mmol/L 99 96 98 95* 93*   < > 92*   CO2 mmol/L 27 32 29 30 30   < > 29   BUN mg/dL 12 14 16 16 14   < > 13   CREATININE mg/dL 0 92 1 06 0 98 1 09 0 96   < > 0 92   CALCIUM mg/dL 8 3* 9 1 8 1* 8 6 8 8   < > 8 7   ALK PHOS U/L  --   --   --   --   --   --  83   ALT U/L  --   --   --   --   --   --  26   AST U/L  --   --   --   --   --   --  27    < > = values in this interval not displayed  Portions of the record may have been created with voice recognition software  Occasional wrong word or "sound a like" substitutions may have occurred due to the inherent limitations of voice recognition software  Read the chart carefully and recognize, using context, where substitutions have occurred  If you have any questions, please contact the dictating provider

## 2022-10-17 NOTE — PROGRESS NOTES
10/17/22 1000 10/17/22 1330   Activity/Group Checklist   Group Community meeting  (topic  self awarenss ) Wellness  (guided imagery relaxation"beach walk")   Attendance Attended Attended   Attendance Duration (min) 16-30 31-45   Interactions Other (Comment)  (pt  easily distracted by Dr  walking past group room door  Pt  anxious to speak with that Dr ) Other (Comment)  (Pt  needed redirection away from increased focus on a female peer becoming more confused while remaining at the berkowitz phone  Pt was able to refocus after several staff cues )   Affect/Mood Other (Comment)  (Pt  expressed not feeling like himself yet  Eye contact was fair ) Other (Comment)  (anxious )   Goals Achieved Able to engage in interactions; Identified feelings; Discussed coping strategies; Able to listen to others; Able to self-disclose Able to listen to others; Able to engage in interactions

## 2022-10-17 NOTE — CASE MANAGEMENT
Spoke with pt's sister, Dewayne Stiles [phone], to give update  She had specific questions about pt's meds and medical issues  CM asked MD to follow up to give further info

## 2022-10-17 NOTE — TREATMENT TEAM
10/17/22 0853   Team Meeting   Meeting Type Daily Rounds   Initial Conference Date 10/17/22   Team Members Present   Team Members Present Physician;Nurse;;Occupational Therapist   Physician Team Member Dr Az Butler Team Member DEVIKA [facility] Management Team Member Paty Cohen   OT Team Member Cain Jay   Patient/Family Present   Patient Present No   Patient's Family Present No     Visible, med compliant, slept, 4/10 depression, denies anxiety, social at times, labs this AM, 132 = sodium level

## 2022-10-17 NOTE — PROGRESS NOTES
Progress Note - 2625 Neha Moses 58 y o  male MRN: [id number]  Unit/Bed#: [facility]mael [facility] 809-08 Encounter: [id number]    Assessment/Plan   Principal Problem:    SHIMON (generalized anxiety disorder)  Active Problems:    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 attack    Hypertension    GERD (gastroesophageal reflux disease)    Atrial fibrillation ([facility])    Class 2 obesity in adult    History of stroke    Antiphospholipid antibody with hypercoagulable state ([facility] )    Hyperbilirubinemia    CORIE (obstructive sleep apnea)    Occasional tremors    PVD (peripheral vascular disease) ([facility])    Medical clearance for incarceration    Mild protein-calorie malnutrition ([facility])    Sinus arrhythmia    Supratherapeutic INR    Hyponatremia    SIADH (syndrome of inappropriate ADH production) ([facility] )    Subclinical hyperthyroidism      Recommended Treatment:   No psychopharmacologic changes necessary at this moment with the exception of restarting mirtazapine 25mg HS for anxiety  Will consider longer term use of ativan due to complex medical picture and inability to tolerate SSRI, change trazodone to PRN; will continue to assess daily for further optimization  Continue with pharmacotherapy, group therapy, milieu therapy and occupational therapy  Continue to assess for adverse medication side effects  Encourage Hans Miranda to participate in nonverbal forms of therapy including journaling and art/music therapy  Continue frequent safety checks and vitals per unit protocol  Continue to engage CM/SW to assist with collateral, disposition planning, and the implementation of an individualized, patient-centered plan of care  Continue medical management by medical team   Case discussed with treatment team     Legal Status: 201  ------------------------------------------------------------    Subjective:  All documentation including nursing notes, medication history to ensure medication adherence on the unit, labs, and vitals were reviewed  Lio Paige was evaluated this morning for continuity of care and no acute distress noted throughout the evaluation  Over the past 24 hours per nursing report, Lio Paige has been cooperative on the unit and compliant with medications  Today, Lio Paige is consenting for safety on the unit  Lio Paige reports feeling "constantly defeated " Lio Paige notes having decent sleep  Lio Paige states having a good appetite  Lio Paige has been taking the medications as prescribed and reporting no side effects  Patient was seen and examined today  We discussed that sleep was not a problem but when he needs to get up he would feel like his body is "filled with lead" and then stated he didn't want his sleep medication changed because he is afraid he wouldn't be able to sleep  Lio Paige denies suicidal ideations  Lio Paige denies  homicidal ideations  Regarding hallucinations, Lio Paige denies and does not appear to be internally stimulated  PRNs overnight: none   VS: Reviewed, within normal limits    Progress Toward Goals: slow improvement    Psychiatric Review of Systems:  Behavior over the last 24 hours:  improved  Sleep: normal  Appetite: improving  Medication side effects: No   ROS: all other systems are negative    Vital signs in last 24 hours:  Temp:  [97 8 °F (36 6 °C)-98 5 °F (36 9 °C)] 97 8 °F (36 6 °C)  HR:  [80-88] 88  Resp:  [16] 16  BP: (116-138)/(63-78) 128/63    Laboratory results:  I have personally reviewed all pertinent laboratory/tests results    Recent Results (from the past 48 hour(s))   Basic metabolic panel    Collection Time: 10/15/22 10:27 AM   Result Value Ref Range    Sodium 129 (L) 135 - 147 mmol/L    Potassium 3 9 3 5 - 5 3 mmol/L    Chloride 93 (L) 96 - 108 mmol/L    CO2 30 21 - 32 mmol/L    ANION GAP 6 5 - 14 mmol/L    BUN 14 5 - 25 mg/dL    Creatinine 0 96 0 70 - 1 50 mg/dL    Glucose 117 (H) 70 - 99 mg/dL    Calcium 8 8 8 4 - 10 2 mg/dL    eGFR 84 ml/min/1 73sq m   Basic metabolic panel    Collection Time: 10/15/22  4:19 PM   Result Value Ref Range    Sodium 130 (L) 135 - 147 mmol/L    Potassium 4 0 3 5 - 5 3 mmol/L    Chloride 95 (L) 96 - 108 mmol/L    CO2 30 21 - 32 mmol/L    ANION GAP 5 5 - 14 mmol/L    BUN 16 5 - 25 mg/dL    Creatinine 1 09 0 70 - 1 50 mg/dL    Glucose 107 (H) 70 - 99 mg/dL    Calcium 8 6 8 4 - 10 2 mg/dL    eGFR 72 ml/min/1 73sq m   Basic metabolic panel    Collection Time: 10/16/22 12:06 AM   Result Value Ref Range    Sodium 129 (L) 135 - 147 mmol/L    Potassium 3 6 3 5 - 5 3 mmol/L    Chloride 98 96 - 108 mmol/L    CO2 29 21 - 32 mmol/L    ANION GAP 2 (L) 5 - 14 mmol/L    BUN 16 5 - 25 mg/dL    Creatinine 0 98 0 70 - 1 50 mg/dL    Glucose 115 (H) 70 - 99 mg/dL    Calcium 8 1 (L) 8 4 - 10 2 mg/dL    eGFR 82 ml/min/1 73sq m   Basic metabolic panel    Collection Time: 10/16/22  9:56 AM   Result Value Ref Range    Sodium 134 (L) 135 - 147 mmol/L    Potassium 4 0 3 5 - 5 3 mmol/L    Chloride 96 96 - 108 mmol/L    CO2 32 21 - 32 mmol/L    ANION GAP 6 5 - 14 mmol/L    BUN 14 5 - 25 mg/dL    Creatinine 1 06 0 70 - 1 50 mg/dL    Glucose 128 (H) 70 - 99 mg/dL    Calcium 9 1 8 4 - 10 2 mg/dL    eGFR 74 ml/min/1 73sq m   Basic metabolic panel    Collection Time: 10/17/22  5:15 AM   Result Value Ref Range    Sodium 132 (L) 135 - 147 mmol/L    Potassium 3 9 3 5 - 5 3 mmol/L    Chloride 99 96 - 108 mmol/L    CO2 27 21 - 32 mmol/L    ANION GAP 6 5 - 14 mmol/L    BUN 12 5 - 25 mg/dL    Creatinine 0 92 0 70 - 1 50 mg/dL    Glucose 119 (H) 70 - 99 mg/dL    Glucose, Fasting 119 (H) 70 - 99 mg/dL    Calcium 8 3 (L) 8 4 - 10 2 mg/dL    eGFR 88 ml/min/1 73sq m   Protime-INR    Collection Time: 10/17/22  5:15 AM   Result Value Ref Range    Protime 18 4 (H) 11 6 - 14 5 seconds    INR 1 49 (H) 0 84 - 1 19         Mental Status Evaluation:    Appearance:  casually dressed, adequate grooming, looks stated age, overweight, overtly appearing  male, in no apparent distress, good eye contact   Behavior: cooperative   Speech:  normal rate and volume, stuttering on occasion   Mood:  "constantly defeated"   Affect:  constricted   Thought Process:  perseverative, negative thinking   Associations: circumstantial associations   Thought Content:  no overt delusions   Perceptual Disturbances: Denies auditory or visual hallucinations and Does not appear to be responding to internal stimuli   Risk Potential: Suicidal ideation - None at present  Homicidal ideation - None at present  Potential for aggression - Not at present   Sensorium:  oriented to person, place and time/date   Memory:  recent and remote memory grossly intact   Consciousness:  alert and awake   Attention/Concentration: attention span and concentration are age appropriate   Insight:  partial   Judgment: partial   Gait/Station: uses walker   Motor Activity: no abnormal movements       Current Medications:  Current Facility-Administered Medications   Medication Dose Route Frequency Provider Last Rate   • acetaminophen  650 mg Oral Q4H PRN Swapna Burk MD     • acetaminophen  650 mg Oral Q4H PRN Swapna Burk MD     • acetaminophen  975 mg Oral Q6H PRN Swapna Burk MD     • aluminum-magnesium hydroxide-simethicone  30 mL Oral Q4H PRN Swapna Burk MD     • amLODIPine  5 mg Oral Daily Keily Espino PA-C     • ARIPiprazole  10 mg Oral Daily Rachel Tse DO     • atorvastatin  40 mg Oral Daily With Sachi Mendoza MD     • benztropine  1 mg Intramuscular Q4H PRN Max 6/day Swapna Burk MD     • benztropine  0 5 mg Oral Q4H PRN Max 6/day Swapna Burk MD     • carvedilol  37 5 mg Oral BID With Meals Keily Espino PA-C     • docusate sodium  100 mg Oral HS [facility] DONELL Diaz     • famotidine  40 mg Oral BID Tomas Sánchez PA-C     • haloperidol lactate  5 mg Intramuscular Q4H PRN Max 4/day Swapna Burk MD     • hydrOXYzine HCL  25 mg Oral Q6H PRN Max 4/day Swapna Burk MD     • LORazepam  1 mg Intramuscular Q6H PRN Max 3/day Jose Helms MD     • LORazepam  0 25 mg Oral TID Chandler Fernandes DO     • LORazepam  0 5 mg Oral Q6H PRN DONELL Tineo     • melatonin  3 mg Oral HS Jose Helms MD     • pantoprazole  40 mg Oral BID Tammy Jose PA-C     • polyethylene glycol  17 g Oral Daily PRN Jose Helms MD     • potassium chloride  20 mEq Oral Daily Laura Zavala MD     • propranolol  5 mg Oral Q8H PRN Jose Helms MD     • risperiDONE  0 25 mg Oral Q4H PRN Max 6/day Laura Zavala MD     • risperiDONE  0 5 mg Oral Q4H PRN Max 3/day Jose Helsm MD     • risperiDONE  1 mg Oral Q2H PRN Max 3/day Jose Helms MD     • senna-docusate sodium  1 tablet Oral Daily PRN Jose Helms MD     • sodium chloride  1 g Oral TID With Meals Nighat Tse PA-C     • traZODone  100 mg Oral HS Chandler Fernandes DO     • warfarin  2 mg Oral Daily (warfarin) Georges Streeter PA-C         Suicide/Homicide Risk Assessment:  Risk of Harm to Self:   Based on today's assessment, Dipika Patelh presents the following risk of harm to self: minimal    Risk of Harm to Others:  Based on today's assessment, Dipika Patelh presents the following risk of harm to others: minimal    The following interventions are recommended: behavioral checks every 7 minutes, continued hospitalization on locked unit    Behavioral Health Medications: All current active meds have been reviewed  Changes as in plan section above  Risks, benefits and possible side effects of Medications:   Risks, benefits, and possible side effects of medications explained to patient and patient verbalizes understanding  Counseling / Coordination of Care:  Patient's progress discussed with staff in treatment team meeting  Medications, treatment progress and treatment plan reviewed with patient  Chandler Fernandes 10/17/22  Psychiatry Resident, PGY-II    This note was completed in part utilizing [employer] Software   Grammatical, translation, syntax errors, random word insertions, spelling mistakes, and incomplete sentences may be an occasional consequence of this system secondary to software limitations with voice recognition, ambient noise, and hardware issues  If you have any questions or concerns about the content, text, or information contained within the body of this dictation, please contact the provider for clarification

## 2022-10-17 NOTE — PROGRESS NOTES
INR subtherapeutic at 1 49 this morning  Patient currently on 2 mg of warfarin  Goal INR 2-3  Will increase warfarin to 3 mg daily  Recheck PT/INR on 10/19/2022

## 2022-10-17 NOTE — NURSING NOTE
Patient was visible, minimal peer interaction and cooperative  VSS  Rate depression 4/10, denies all other psych s/s  Mild tremors noted left hand  Compliant with HS medications  Safety checks ongoing

## 2022-10-17 NOTE — PLAN OF CARE
Problem: Anxiety  Goal: Anxiety is at manageable level  Description: Interventions:  - Assess and monitor patient's anxiety level  - Monitor for signs and symptoms (heart palpitations, chest pain, shortness of breath, headaches, nausea, feeling jumpy, restlessness, irritable, apprehensive)  - Collaborate with interdisciplinary team and initiate plan and interventions as ordered    - [place] patient to unit/surroundings  - Explain treatment plan  - Encourage participation in care  - Encourage verbalization of concerns/fears  - Identify coping mechanisms  - Assist in developing anxiety-reducing skills  - Administer/offer alternative therapies  - Limit or eliminate stimulants  Outcome: Progressing     Problem: Ineffective Coping  Goal: Free from restraint events  Description: - Utilize least restrictive measures   - Provide behavioral interventions   - Redirect inappropriate behaviors   Outcome: Progressing

## 2022-10-17 NOTE — PLAN OF CARE
Problem: Anxiety  Goal: Anxiety is at manageable level  Description: Interventions:  - Assess and monitor patient's anxiety level  - Monitor for signs and symptoms (heart palpitations, chest pain, shortness of breath, headaches, nausea, feeling jumpy, restlessness, irritable, apprehensive)  - Collaborate with interdisciplinary team and initiate plan and interventions as ordered  - [place] patient to unit/surroundings  - Explain treatment plan  - Encourage participation in care  - Encourage verbalization of concerns/fears  - Identify coping mechanisms  - Assist in developing anxiety-reducing skills  - Administer/offer alternative therapies  - Limit or eliminate stimulants  Outcome: Progressing     Problem: Nutrition/Hydration-ADULT  Goal: Nutrient/Hydration intake appropriate for improving, restoring or maintaining nutritional needs  Description: Monitor and assess patient's nutrition/hydration status for malnutrition  Collaborate with interdisciplinary team and initiate plan and interventions as ordered  Monitor patient's weight and dietary intake as ordered or per policy  Utilize nutrition screening tool and intervene as necessary  Determine patient's food preferences and provide high-protein, high-caloric foods as appropriate       INTERVENTIONS:  - Monitor oral intake, urinary output, labs, and treatment plans  - Assess nutrition and hydration status and recommend course of action  - Evaluate amount of meals eaten  - Assist patient with eating if necessary   - Allow adequate time for meals  - Recommend/ encourage appropriate diets, oral nutritional supplements, and vitamin/mineral supplements  - Order, calculate, and assess calorie counts as needed  - Recommend, monitor, and adjust tube feedings and TPN/PPN based on assessed needs  - Assess need for intravenous fluids  - Provide specific nutrition/hydration education as appropriate  - Include patient/family/caregiver in decisions related to nutrition  Outcome: Progressing

## 2022-10-17 NOTE — NURSING NOTE
Pt visible on unit, appears calm and cooperative  Medication compliant  Good intake at meals  Social with select peers  Pt able to make needs known appropriately

## 2022-10-17 NOTE — PLAN OF CARE
Pt present for 2 of 2 groups yet needed redirection to focus on group room verses hallway interactions of staff and peers    Problem: Ineffective Coping  Goal: Participates in unit activities  Description: Interventions:  - Provide therapeutic environment   - Provide required programming   - Redirect inappropriate behaviors   Outcome: Progressing

## 2022-10-17 NOTE — CASE MANAGEMENT
Pt reports that he needs to pay his fuel bill and asked CM for assistance  CM assisted pt with call to [facility]  Pt was told that he currently has $495 in his acct but the minimum amt of oil for delivery is 150 gallons, which will cost $815  Pt would need to supplement his acct with credit card  CM assisted pt with call to his sister, Wan Parmar, to request credit card info so pt could pay fuel bill over the phone  Wan Parmar reports that pt is still waiting on [facility] approval for this quarter and should hold off on filling oil tank until that documentation is received  Additionally, pt's oil tank is about half full and fuel company will not fill up tank until is is 1/4 full or less  Wan Parmar encouraged pt to table this issue until tank gets lower and/or [facility] approval comes through  Wan Parmar would like an update on pt's discharge date  CM assisted pt with call to [address] [place] [address] [address] to check status of application but was on hold for 20 minutes  Pt asked to end the call  Pt asked CM for assistance with calling  office tomorrow to get status update on [employer] with Rx Meds form that pt filled out and submitted  CM to follow up

## 2022-10-18 LAB
ATRIAL RATE: 78 BPM
P AXIS: 49 DEGREES
PR INTERVAL: 146 MS
QRS AXIS: 22 DEGREES
QRSD INTERVAL: 80 MS
QT INTERVAL: 394 MS
QTC INTERVAL: 449 MS
SODIUM SERPL-SCNC: 134 MMOL/L (ref 135–147)
T WAVE AXIS: -5 DEGREES
VENTRICULAR RATE: 78 BPM

## 2022-10-18 PROCEDURE — 99232 SBSQ HOSP IP/OBS MODERATE 35: CPT | Performed by: STUDENT IN AN ORGANIZED HEALTH CARE EDUCATION/TRAINING PROGRAM

## 2022-10-18 PROCEDURE — 93010 ELECTROCARDIOGRAM REPORT: CPT | Performed by: INTERNAL MEDICINE

## 2022-10-18 PROCEDURE — 84295 ASSAY OF SERUM SODIUM: CPT

## 2022-10-18 RX ADMIN — MIRTAZAPINE 15 MG: 15 TABLET, FILM COATED ORAL at 21:07

## 2022-10-18 RX ADMIN — PANTOPRAZOLE SODIUM 40 MG: 40 TABLET, DELAYED RELEASE ORAL at 21:08

## 2022-10-18 RX ADMIN — FAMOTIDINE 40 MG: 20 TABLET ORAL at 08:40

## 2022-10-18 RX ADMIN — ATORVASTATIN CALCIUM 40 MG: 40 TABLET, FILM COATED ORAL at 17:07

## 2022-10-18 RX ADMIN — SODIUM CHLORIDE 1 G: 1 TABLET ORAL at 08:37

## 2022-10-18 RX ADMIN — MELATONIN TAB 3 MG 3 MG: 3 TAB at 21:08

## 2022-10-18 RX ADMIN — AMLODIPINE BESYLATE 5 MG: 5 TABLET ORAL at 08:38

## 2022-10-18 RX ADMIN — WARFARIN SODIUM 4 MG: 2 TABLET ORAL at 17:07

## 2022-10-18 RX ADMIN — TRAZODONE HYDROCHLORIDE 100 MG: 100 TABLET ORAL at 21:12

## 2022-10-18 RX ADMIN — DOCUSATE SODIUM 100 MG: 100 CAPSULE, LIQUID FILLED ORAL at 21:07

## 2022-10-18 RX ADMIN — SODIUM CHLORIDE 1 G: 1 TABLET ORAL at 17:07

## 2022-10-18 RX ADMIN — SODIUM CHLORIDE 1 G: 1 TABLET ORAL at 12:02

## 2022-10-18 RX ADMIN — LORAZEPAM 0.25 MG: 0.5 TABLET ORAL at 21:07

## 2022-10-18 RX ADMIN — FAMOTIDINE 40 MG: 20 TABLET ORAL at 17:07

## 2022-10-18 RX ADMIN — CARVEDILOL 37.5 MG: 12.5 TABLET, FILM COATED ORAL at 17:07

## 2022-10-18 RX ADMIN — CARVEDILOL 37.5 MG: 12.5 TABLET, FILM COATED ORAL at 08:38

## 2022-10-18 RX ADMIN — ARIPIPRAZOLE 10 MG: 10 TABLET ORAL at 08:40

## 2022-10-18 RX ADMIN — PANTOPRAZOLE SODIUM 40 MG: 40 TABLET, DELAYED RELEASE ORAL at 08:40

## 2022-10-18 RX ADMIN — LORAZEPAM 0.25 MG: 0.5 TABLET ORAL at 08:41

## 2022-10-18 RX ADMIN — LORAZEPAM 0.25 MG: 0.5 TABLET ORAL at 17:07

## 2022-10-18 NOTE — NURSING NOTE
Pt visible on unit, anxious on approach  Pt frequently seeking out this writer and calling out for staff from his room  Pt reports feeling anxious regarding his fluid restriction  States he is worried he will be "peeing less" if he is "drinking less"  Pt reassured  Pt also repeatedly seeking out staff to ensure "the lady doesn't get my bathroom floor wet by cleaning it"  Pt reassured  Pt then anxious in regards to trazodone no longer being a scheduled medication  Assured he is still able to ask for it and provided with prn trazodone at 2120 for sleep  Rates anxiety 3/4, depression "5,6,or7"/10, denies SI/HI/AVH  Will maintain q7min checks

## 2022-10-18 NOTE — PROGRESS NOTES
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 attack    Hypertension    GERD (gastroesophageal reflux disease)    Atrial fibrillation (HCC)    Class 2 obesity in adult    History of stroke    Antiphospholipid antibody with hypercoagulable state ([facility] )    Hyperbilirubinemia    CORIE (obstructive sleep apnea)    Occasional tremors    PVD (peripheral vascular disease) ([facility])    Medical clearance for incarceration    Mild protein-calorie malnutrition (HCC)    Sinus arrhythmia    Supratherapeutic INR    Hyponatremia    SIADH (syndrome of inappropriate ADH production) ([facility] )    Subclinical hyperthyroidism      Recommended Treatment:   No psychopharmacologic changes necessary at this moment; will continue to assess daily for further optimization  Follow up with endocrinology - recommend outpatient folllowup    Continue with pharmacotherapy, group therapy, milieu therapy and occupational therapy  Continue to assess for adverse medication side effects  Encourage vaniapee Carri 22 to participate in nonverbal forms of therapy including journaling and art/music therapy  Continue frequent safety checks and vitals per unit protocol  Continue to engage CM/SW to assist with collateral, disposition planning, and the implementation of an individualized, patient-centered plan of care  Continue medical management by medical team   Case discussed with treatment team     Legal Status: 201  ------------------------------------------------------------    Subjective: All documentation including nursing notes, medication history to ensure medication adherence on the unit, labs, and vitals were reviewed   Lolis Buckley was evaluated this morning for continuity of care and no acute distress noted throughout the evaluation  Over the past 24 hours per nursing report, Katie Do has been cooperative on the unit and compliant with medications  Today, Katie Do is consenting for safety on the unit  Katie Do reports feeling "frustrated " Katie Do notes having decreased sleep  Katie Do states having a improved appetite  Katie Do has been preoccupied, unable to make decisions including whether not to go to the bathroom or stay in bed, standing up or sitting down, however taking the medications as prescribed and reporting no side effects  Patient was seen and examined today  We discussed his frustration with his daily tasks which include elimination, taking medications, walking, attending groups  Patient is worried about his finances as well  Patient states that he cannot both sit down and stand up to the bathroom and that he needs to both separately but he is unsure which 1 he should do 1st   He continues to perseverate on this and needs multiple redirections  Patient still deeply entrenched in negative thought patterns and is unable to function  Katie Do denies suicidal ideations  Katie Do denies homicidal ideations  Regarding hallucinations, Katie Do denies and does not appear internally stimulated    PRNs overnight: trazodone 50mg for sleep  VS: Reviewed, within normal limits    Progress Toward Goals: slow improvement    Psychiatric Review of Systems:  Behavior over the last 24 hours:  improved  Sleep: decreased  Appetite: improving  Medication side effects: No   ROS: patient endorses continued weakness in legs, requiring walker still    Vital signs in last 24 hours:  Temp:  [97 1 °F (36 2 °C)-97 6 °F (36 4 °C)] 97 6 °F (36 4 °C)  HR:  [82-91] 91  Resp:  [16-18] 16  BP: (126-190)/(76-94) 190/94    Laboratory results:  I have personally reviewed all pertinent laboratory/tests results    Recent Results (from the past 48 hour(s))   Basic metabolic panel    Collection Time: 10/16/22  9:56 AM   Result Value Ref Range    Sodium 134 (L) 135 - 147 mmol/L Potassium 4 0 3 5 - 5 3 mmol/L    Chloride 96 96 - 108 mmol/L    CO2 32 21 - 32 mmol/L    ANION GAP 6 5 - 14 mmol/L    BUN 14 5 - 25 mg/dL    Creatinine 1 06 0 70 - 1 50 mg/dL    Glucose 128 (H) 70 - 99 mg/dL    Calcium 9 1 8 4 - 10 2 mg/dL    eGFR 74 ml/min/1 73sq m   Basic metabolic panel    Collection Time: 10/17/22  5:15 AM   Result Value Ref Range    Sodium 132 (L) 135 - 147 mmol/L    Potassium 3 9 3 5 - 5 3 mmol/L    Chloride 99 96 - 108 mmol/L    CO2 27 21 - 32 mmol/L    ANION GAP 6 5 - 14 mmol/L    BUN 12 5 - 25 mg/dL    Creatinine 0 92 0 70 - 1 50 mg/dL    Glucose 119 (H) 70 - 99 mg/dL    Glucose, Fasting 119 (H) 70 - 99 mg/dL    Calcium 8 3 (L) 8 4 - 10 2 mg/dL    eGFR 88 ml/min/1 73sq m   Protime-INR    Collection Time: 10/17/22  5:15 AM   Result Value Ref Range    Protime 18 4 (H) 11 6 - 14 5 seconds    INR 1 49 (H) 0 84 - 1 19   Sodium    Collection Time: 10/18/22  5:43 AM   Result Value Ref Range    Sodium 134 (L) 135 - 147 mmol/L         Mental Status Evaluation:    Appearance:  casually dressed, marginal hygiene, looks older than stated age, overweight, overtly appearing male, in no apparent acute distress, poor eye contcat   Behavior:  cooperative, restless and fidgety, at times hyperventilating   Speech:  normal rate and volume with in creased stuttering   Mood:  "frustrated"   Affect:  constricted   Thought Process:  circumstantial, perseverative, negative thinking   Associations: circumstantial associations, perseveration   Thought Content:  mild paranoia, negative thinking, ruminations, preoccupied   Perceptual Disturbances: Denies auditory or visual hallucinations and Does not appear to be responding to internal stimuli   Risk Potential: Suicidal ideation - None at present  Homicidal ideation - None at present  Potential for aggression - Not at present   Sensorium:  oriented to person, place and time/date   Memory:  recent and remote memory grossly intact   Consciousness:  alert and awake Attention/Concentration: attention span and concentration are age appropriate   Insight:  impaired   Judgment: impaired   Gait/Station: uses walker   Motor Activity: tremor, worsening during discreet episodes of increased anxiety       Current Medications:  Current Facility-Administered Medications   Medication Dose Route Frequency Provider Last Rate   • acetaminophen  650 mg Oral Q4H PRN Jose Helms MD     • acetaminophen  650 mg Oral Q4H PRN Jose Helms MD     • acetaminophen  975 mg Oral Q6H PRN Jose Helms MD     • aluminum-magnesium hydroxide-simethicone  30 mL Oral Q4H PRN Jose Helms MD     • amLODIPine  5 mg Oral Daily Keily Espino PA-C     • ARIPiprazole  10 mg Oral Daily Chandler Fernandes DO     • atorvastatin  40 mg Oral Daily With Dinner Jose Helms MD     • benztropine  1 mg Intramuscular Q4H PRN Max 6/day Jose Helms MD     • benztropine  0 5 mg Oral Q4H PRN Max 6/day Laura Zavala MD     • carvedilol  37 5 mg Oral BID With Meals Keily Espino PA-C     • docusate sodium  100 mg Oral HS DONELL Tineo     • famotidine  40 mg Oral BID Tammy Jose PA-C     • haloperidol lactate  5 mg Intramuscular Q4H PRN Max 4/day Jose Helms MD     • hydrOXYzine HCL  25 mg Oral Q6H PRN Max 4/day Jose Helms MD     • LORazepam  1 mg Intramuscular Q6H PRN Max 3/day Jose Helms MD     • LORazepam  0 25 mg Oral TID Chandler Fernandes DO     • LORazepam  0 5 mg Oral Q6H PRN DONELL Tineo     • melatonin  3 mg Oral HS Jose Helms MD     • mirtazapine  15 mg Oral HS Chandler Fernandes DO     • pantoprazole  40 mg Oral BID Tammy Jose PA-C     • polyethylene glycol  17 g Oral Daily PRN Jose Helms MD     • propranolol  5 mg Oral Q8H PRN Jose Helms MD     • risperiDONE  0 25 mg Oral Q4H PRN Max 6/day Jose Helms MD     • risperiDONE  0 5 mg Oral Q4H PRN Max 3/day Jose Helms MD     • risperiDONE  1 mg Oral Q2H PRN Max 3/day Evelin Perkins MD     • senna-docusate sodium  1 tablet Oral Daily PRN Evelin Perkins MD     • sodium chloride  1 g Oral TID With Meals Bear Marcelino PA-C     • traZODone  100 mg Oral HS PRN Darline Urbano MD     • warfarin  4 mg Oral Daily (warfarin) Pierre Barahona PA-C         Suicide/Homicide Risk Assessment:  Risk of Harm to Self:   Based on today's assessment, Mariah Marie presents the following risk of harm to self: high - patient is unable to care for self and requires multiple redirections, encouragement and physical assistance  Risk of Harm to Others:  Based on today's assessment, Mariah Marie presents the following risk of harm to others: minimal    The following interventions are recommended: behavioral checks every 7 minutes, continued hospitalization on locked unit    Behavioral Health Medications: All current active meds have been reviewed  Changes as in plan section above  Risks, benefits and possible side effects of Medications:   Risks, benefits, and possible side effects of medications explained to patient and patient verbalizes understanding  Counseling / Coordination of Care:  Patient's progress discussed with staff in treatment team meeting  Medications, treatment progress and treatment plan reviewed with patient  Mendel Grates 10/18/22  Psychiatry Resident, PGY-II    This note was completed in part utilizing [employer] Direct Software  Grammatical, translation, syntax errors, random word insertions, spelling mistakes, and incomplete sentences may be an occasional consequence of this system secondary to software limitations with voice recognition, ambient noise, and hardware issues  If you have any questions or concerns about the content, text, or information contained within the body of this dictation, please contact the provider for clarification

## 2022-10-18 NOTE — NURSING NOTE
Pt alert and visible on the unit  Remains anxious with fine hand tremors noted on left  Reported depression 2-3/10 and anxiety 3/4  Stated, "my anxiety is steadily climbing"  Overstimulated when seated with multiple peers but relaxes when sits by self  Utilizing walker for ambulation  Stated, "My legs got weak from not having a bowel movement"  Na 134 and for repeat level in the am  Maintained on 1500cc fluid restriction  Needs frequent reminders regarding the same  Will continue to monitor and maintain q 7 min checks

## 2022-10-18 NOTE — TREATMENT TEAM
10/18/22 0858   Team Meeting   Meeting Type Daily Rounds   Initial Conference Date 10/18/22   Team Members Present   Team Members Present Physician;Nurse;;; Occupational Therapist   Physician Team Member Dr Genet Louie; Dr Yesi Erazo; Dr Lee Hill Management Team Member [address] [place] [address] Work Team Member Dori   OT Team Member Hoda Salazar   Patient/Family Present   Patient Present No   Patient's Family Present No     Visible, med compliant, slept, high anxiety, preoccupied with anxiety, attended group, sodium level = 134, Remeron restarted last night

## 2022-10-18 NOTE — CASE MANAGEMENT
Met with pt to assist with call to [facility] [phone] regarding Part D Subsidy  Staff reports that pt is already enrolled in this program and has been since 2017  He currently receives a 25% subsidy with Part D rx coverage  There is a re-determination form that needs to be filled out, which will be faxed to CM so pt can fill it out and return it quickly  CM will assist with this  Once form has been submitted, it will take a few weeks to process

## 2022-10-18 NOTE — QUICK NOTE
Patient's sodium today 134  Continue fluid restriction  Continue salt tablets 1 g t i d   Can repeat sodium tomorrow

## 2022-10-19 LAB
INR PPP: 1.33 (ref 0.84–1.19)
PROTHROMBIN TIME: 16.8 SECONDS (ref 11.6–14.5)
SODIUM SERPL-SCNC: 134 MMOL/L (ref 135–147)
SODIUM SERPL-SCNC: 135 MMOL/L (ref 135–147)

## 2022-10-19 PROCEDURE — 99232 SBSQ HOSP IP/OBS MODERATE 35: CPT | Performed by: STUDENT IN AN ORGANIZED HEALTH CARE EDUCATION/TRAINING PROGRAM

## 2022-10-19 PROCEDURE — 84295 ASSAY OF SERUM SODIUM: CPT | Performed by: PHYSICIAN ASSISTANT

## 2022-10-19 PROCEDURE — 99232 SBSQ HOSP IP/OBS MODERATE 35: CPT | Performed by: INTERNAL MEDICINE

## 2022-10-19 PROCEDURE — 84295 ASSAY OF SERUM SODIUM: CPT

## 2022-10-19 PROCEDURE — 85610 PROTHROMBIN TIME: CPT | Performed by: INTERNAL MEDICINE

## 2022-10-19 RX ORDER — MIRTAZAPINE 30 MG/1
30 TABLET, FILM COATED ORAL
Status: DISCONTINUED | OUTPATIENT
Start: 2022-10-19 | End: 2022-10-20

## 2022-10-19 RX ORDER — SODIUM CHLORIDE 1000 MG
1 TABLET, SOLUBLE MISCELLANEOUS 2 TIMES DAILY WITH MEALS
Status: DISCONTINUED | OUTPATIENT
Start: 2022-10-19 | End: 2022-10-21

## 2022-10-19 RX ORDER — ENOXAPARIN SODIUM 100 MG/ML
1 INJECTION SUBCUTANEOUS EVERY 12 HOURS SCHEDULED
Status: DISCONTINUED | OUTPATIENT
Start: 2022-10-19 | End: 2022-10-23

## 2022-10-19 RX ORDER — TORSEMIDE 20 MG/1
10 TABLET ORAL DAILY
Status: DISCONTINUED | OUTPATIENT
Start: 2022-10-19 | End: 2022-10-26 | Stop reason: HOSPADM

## 2022-10-19 RX ADMIN — LORAZEPAM 0.25 MG: 0.5 TABLET ORAL at 17:13

## 2022-10-19 RX ADMIN — FAMOTIDINE 40 MG: 20 TABLET ORAL at 17:15

## 2022-10-19 RX ADMIN — LORAZEPAM 0.25 MG: 0.5 TABLET ORAL at 21:08

## 2022-10-19 RX ADMIN — CARVEDILOL 37.5 MG: 12.5 TABLET, FILM COATED ORAL at 17:13

## 2022-10-19 RX ADMIN — TORSEMIDE 10 MG: 20 TABLET ORAL at 12:56

## 2022-10-19 RX ADMIN — FAMOTIDINE 40 MG: 20 TABLET ORAL at 09:00

## 2022-10-19 RX ADMIN — SODIUM CHLORIDE 1 G: 1 TABLET ORAL at 17:14

## 2022-10-19 RX ADMIN — PANTOPRAZOLE SODIUM 40 MG: 40 TABLET, DELAYED RELEASE ORAL at 09:00

## 2022-10-19 RX ADMIN — ATORVASTATIN CALCIUM 40 MG: 40 TABLET, FILM COATED ORAL at 17:14

## 2022-10-19 RX ADMIN — WARFARIN SODIUM 4 MG: 2 TABLET ORAL at 17:15

## 2022-10-19 RX ADMIN — MELATONIN TAB 3 MG 3 MG: 3 TAB at 21:09

## 2022-10-19 RX ADMIN — CARVEDILOL 37.5 MG: 12.5 TABLET, FILM COATED ORAL at 08:59

## 2022-10-19 RX ADMIN — SENNOSIDES AND DOCUSATE SODIUM 1 TABLET: 50; 8.6 TABLET ORAL at 12:57

## 2022-10-19 RX ADMIN — SODIUM CHLORIDE 1 G: 1 TABLET ORAL at 09:00

## 2022-10-19 RX ADMIN — LORAZEPAM 0.25 MG: 0.5 TABLET ORAL at 09:01

## 2022-10-19 RX ADMIN — PANTOPRAZOLE SODIUM 40 MG: 40 TABLET, DELAYED RELEASE ORAL at 21:09

## 2022-10-19 RX ADMIN — ENOXAPARIN SODIUM 90 MG: 100 INJECTION SUBCUTANEOUS at 21:10

## 2022-10-19 RX ADMIN — DOCUSATE SODIUM 100 MG: 100 CAPSULE, LIQUID FILLED ORAL at 21:08

## 2022-10-19 RX ADMIN — ARIPIPRAZOLE 10 MG: 10 TABLET ORAL at 08:58

## 2022-10-19 RX ADMIN — AMLODIPINE BESYLATE 5 MG: 5 TABLET ORAL at 08:59

## 2022-10-19 RX ADMIN — MIRTAZAPINE 30 MG: 30 TABLET, FILM COATED ORAL at 21:08

## 2022-10-19 NOTE — PROGRESS NOTES
Pt attended short term problem solving group  Pt anxious and limited interactions        10/19/22 1100   Activity/Group Checklist   Group Other (Comment)  (short term problem solving)   Attendance Attended   Attendance Duration (min) 31-45   Interactions Other (Comment)  (minimal)   Affect/Mood Constricted   Goals Achieved Able to listen to others

## 2022-10-19 NOTE — PROGRESS NOTES
Progress Note - 2625 Neha Moses 58 y o  male MRN: [id number]  Unit/Bed#: Nicole Stanley 369-22 Encounter: [id number]    Assessment/Plan   Principal Problem:    SHIMON (generalized anxiety disorder)  Active Problems:    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 attack    Hypertension    GERD (gastroesophageal reflux disease)    Atrial fibrillation (HCC)    Class 2 obesity in adult    History of stroke    Antiphospholipid antibody with hypercoagulable state ([facility] )    Hyperbilirubinemia    CORIE (obstructive sleep apnea)    Occasional tremors    PVD (peripheral vascular disease) ([facility])    Medical clearance for incarceration    Mild protein-calorie malnutrition (HCC)    Sinus arrhythmia    Supratherapeutic INR    Hyponatremia    SIADH (syndrome of inappropriate ADH production) ([facility] )    Subclinical hyperthyroidism      Recommended Treatment:   Abilify 10 mg daily for racing thoughts  Continue lorazepam 0 25 mg t i d  For anxiety  Continue melatonin 3 mg HS for sleep  Increase mirtazapine to 30 mg HS for anxiety and depression and sleep  Contacted Medicine to evaluate as patient is complaining of new acute onset of burning/weakness/pain in his bilateral legs  Reached out to physical therapy for follow-up in treatment with patient  Reached out to cardiology discuss cardiac clearance for patient  Endocrinology has signed off: follow-up outpatient in 4 weeks for metanephrins and thyroid uptake scan  Nephrology decreased sodium, added torsemide, following  Appreciate recs    Continue with pharmacotherapy, group therapy, milieu therapy and occupational therapy  Continue to assess for adverse medication side effects  Encourage aissatouscott Carri 22 to participate in nonverbal forms of therapy including journaling and art/music therapy  Continue frequent safety checks and vitals per unit protocol    Continue to engage CM/SW to assist with collateral, disposition planning, and the implementation of an individualized, patient-centered plan of care  Continue medical management by medical team   Case discussed with treatment team     Legal Status: 201  ------------------------------------------------------------    Subjective: All documentation including nursing notes, medication history to ensure medication adherence on the unit, labs, and vitals were reviewed  Janneth Gibbs was evaluated this morning for continuity of care and no acute distress noted throughout the evaluation  Over the past 24 hours per nursing report, Janneth Gibbs has been ruminating about small decisions needing redirection and assistance making small decisions of daily tasks, he was unable to walk around due to constipation, cooperative on the unit and compliant with medications  Today, Janneth Gibbs is consenting for safety on the unit  Janneth Gibbs reports feeling "depressed" to the fact that his legs “hurt”  Upon further questioning, patient states his legs are “weak” however he is able to get up out of his chair without assistance  Patient states during the conversation that is likes are “burning” and ultimately when asked why his legs are feeling this way, patient states that he has used them up because he has been on the psychiatric floor too long  Janneth Gibbs notes having adequate sleep  Janneth Gibbs states having a decent appetite  Janneth Gibbs has been taking the medications as prescribed and reporting side effects of his left eye closing  Patient is able to open both eyes when asked  Janneth Gibbs denies suicidal ideations  aJnneth Gibbs denies homicidal ideations  Regarding hallucinations, Janneth Gibbs denies does not appear internally stimulated  Called sister and provided updates, answered questions      PRNs overnight: trazodone 50mg for sleep  VS: Reviewed, within normal limits    Progress Toward Goals: slow improvement    Psychiatric Review of Systems:  Behavior over the last 24 hours:  improved  Sleep: improved  Appetite: improving  Medication side effects: Yes - Patient states that Abilify is making his left eye close   ROS: all other systems are negative    Vital signs in last 24 hours:  Temp:  [97 1 °F (36 2 °C)-97 6 °F (36 4 °C)] 97 6 °F (36 4 °C)  HR:  [] 93  Resp:  [16] 16  BP: (133-142)/(79-95) 141/95    Laboratory results:  I have personally reviewed all pertinent laboratory/tests results    Recent Results (from the past 48 hour(s))   Sodium    Collection Time: 10/18/22  5:43 AM   Result Value Ref Range    Sodium 134 (L) 135 - 147 mmol/L   Protime-INR    Collection Time: 10/19/22  6:35 AM   Result Value Ref Range    Protime 16 8 (H) 11 6 - 14 5 seconds    INR 1 33 (H) 0 84 - 1 19   Sodium    Collection Time: 10/19/22  6:35 AM   Result Value Ref Range    Sodium 135 135 - 147 mmol/L   Sodium    Collection Time: 10/19/22  6:35 AM   Result Value Ref Range    Sodium 134 (L) 135 - 147 mmol/L         Mental Status Evaluation:    Appearance:  casually dressed, marginal hygiene, looks older than stated age, overweight, [place] appearing  male, in no apparent acute distress, appears anxious when discussion, minimal eye contact   Behavior:  cooperative   Speech:  normal rate and volume, with stuttering   Mood:  "Depressed"   Affect:  Constricted, anxious at times   Thought Process:  increased rate of thoughts, perseverative, negative thinking   Associations: circumstantial associations   Thought Content:  somatic delusions of his left eye being closed, not being able to walk/feeling his legs are weak even though there is no longer medical reason for this possibility, preoccupation around defecation and urination   Perceptual Disturbances: Denies auditory or visual hallucinations and Does not appear to be responding to internal stimuli   Risk Potential: Suicidal ideation - None at present  Homicidal ideation - None at present  Potential for aggression - Not at present   Sensorium:  oriented to person, place and time/date   Memory:  recent and remote memory grossly intact   Consciousness:  alert and awake   Attention/Concentration: attention span and concentration are age appropriate   Insight:  significantly impaired   Judgment: significantly impaired   Gait/Station: slow gait, uses walker   Motor Activity: no abnormal movements, resting baseline tremor, worsens when discussing distressing topics       Current Medications:  Current Facility-Administered Medications   Medication Dose Route Frequency Provider Last Rate   • acetaminophen  650 mg Oral Q4H PRN Isis Jean-Baptiste MD     • acetaminophen  650 mg Oral Q4H PRN Isis Jean-Baptiste MD     • acetaminophen  975 mg Oral Q6H PRN Isis Jean-Baptiste MD     • aluminum-magnesium hydroxide-simethicone  30 mL Oral Q4H PRN Isis Jean-Baptiste MD     • amLODIPine  5 mg Oral Daily Keily Espino PA-C     • ARIPiprazole  10 mg Oral Daily Soraya Nazario, DO     • atorvastatin  40 mg Oral Daily With Dinner Isis Jean-Baptiste MD     • benztropine  1 mg Intramuscular Q4H PRN Max 6/day Isis Jean-Baptiste MD     • benztropine  0 5 mg Oral Q4H PRN Max 6/day Laura Rios MD     • carvedilol  37 5 mg Oral BID With Meals Keily Espino PA-C     • docusate sodium  100 mg Oral HS JAKE NascimentoNP     • famotidine  40 mg Oral BID Tyra Whitfield PA-C     • haloperidol lactate  5 mg Intramuscular Q4H PRN Max 4/day Isis Jean-Baptiste MD     • hydrOXYzine HCL  25 mg Oral Q6H PRN Max 4/day Isis Jean-Baptiste MD     • LORazepam  1 mg Intramuscular Q6H PRN Max 3/day Isis Jean-Baptiste MD     • LORazepam  0 25 mg Oral TID Soraya Nazario, DO     • LORazepam  0 5 mg Oral Q6H PRN Derek Aguila, CRNP     • melatonin  3 mg Oral HS Isis Jean-Baptiste MD     • mirtazapine  15 mg Oral HS Evaleadan Nazario, DO     • pantoprazole  40 mg Oral BID Tyra Whitfield PA-C     • polyethylene glycol  17 g Oral Daily PRN Isis Jean-Baptiste MD     • propranolol  5 mg Oral Q8H PRN Isis Jean-Baptiste MD     • risperiDONE  0 25 mg Oral Q4H PRN Max 6/day Laura Rios MD     • risperiDONE  0 5 mg Oral Q4H PRN Max 3/day Linda Hood MD     • risperiDONE  1 mg Oral Q2H PRN Max 3/day Linda Hood MD     • senna-docusate sodium  1 tablet Oral Daily PRN Linda Hood MD     • sodium chloride  1 g Oral TID With Meals Sebastian Plata PA-C     • traZODone  100 mg Oral HS PRN Stewart Fernandes MD     • warfarin  4 mg Oral Daily (warfarin) Kerline Gil PA-C         Suicide/Homicide Risk Assessment:  Risk of Harm to Self:   Current Specific Risk Factors include: current anxiety symptoms, perseveration and somatization leading to Inability to care for self, inability to make decisions  • Based on today's assessment, Maine Gonzales presents the following risk of harm to self: high   • Based on the above, patient is unable to care for self as a direct result from a psychiatric illness and therefore requiring inpatient psychiatric treatment    Risk of Harm to Others:  Based on today's assessment, Maine Gonzales presents the following risk of harm to others: minimal    The following interventions are recommended: behavioral checks every 7 minutes, continued hospitalization on locked unit    Behavioral Health Medications: All current active meds have been reviewed  Changes as in plan section above  Risks, benefits and possible side effects of Medications:   Risks, benefits, and possible side effects of medications explained to patient and patient verbalizes understanding  Counseling / Coordination of Care:  Patient's progress discussed with staff in treatment team meeting  Medications, treatment progress and treatment plan reviewed with patient  Usman Fernando 10/19/22  Psychiatry Resident, PGY-II    This note was completed in part utilizing [employer] Direct Software  Grammatical, translation, syntax errors, random word insertions, spelling mistakes, and incomplete sentences may be an occasional consequence of this system secondary to software limitations with voice recognition, ambient noise, and hardware issues  If you have any questions or concerns about the content, text, or information contained within the body of this dictation, please contact the provider for clarification

## 2022-10-19 NOTE — NURSING NOTE
Pt visible in day room, pleasant on approach  Pt rates 5/10 depression related to "everyone is leaving, the friends I have made here"  Pt reassured and support provided  Pt also endorses 2/4 anxiety related to HS trazodone  Pt expresses concern of inability to "get off the toilet" when he takes trazodone  Assured he will receive the assistance he requires  Ate 100% of snack  Medication compliant  Provided with prn trazodone at 2112  Will maintain q7min checks

## 2022-10-19 NOTE — PLAN OF CARE
Pt  Is attending more groups with minimal prompting and does quietly engage in peer interactions if included in conversation    Problem: Ineffective Coping  Goal: Participates in unit activities  Description: Interventions:  - Provide therapeutic environment   - Provide required programming   - Redirect inappropriate behaviors   Outcome: Progressing

## 2022-10-19 NOTE — TREATMENT TEAM
10/19/22 0851   Team Meeting   Meeting Type Daily Rounds   Initial Conference Date 10/19/22   Team Members Present   Team Members Present Physician;Nurse;;Occupational Therapist   Physician Team Member Dr Luis Ontiveros; Dr Jaida Moyer; Dr Dl Echevarria Management Team Member Katarzyna Villafana   OT Team Member Gracie Montalvo   Patient/Family Present   Patient Present No   Patient's Family Present No     Visible, anxious, med compliant, slept, 5/10 depression, 2/4 anxiety, focused on bowels yesterday, attended groups, reports feeling overwhelmed, sodium level = 134

## 2022-10-19 NOTE — CASE MANAGEMENT
Spoke with pt's sister, Dominic Dennis [phone], to complete SS form for Part D subsidy  Additionally, Dominic Dennis reports that she received a call from [facility] stating that an extra $300 was deposited into pt's oil acct so they will be able to make a fuel delivery to the home  CM will update pt

## 2022-10-19 NOTE — NURSING NOTE
Pt alert and visible on the unit  Remains anxious and preoccupied  Left hand tremors continue  Fatalistic  Limited peer interactions  Appetite poor; breakfast 50% and and lunch 25%  Anxiety high  Will continue to monitor and maintain q 7 min checks

## 2022-10-19 NOTE — PROGRESS NOTES
NEPHROLOGY PROGRESS NOTE   Tulio Fried 58 y o  male MRN: [id number]  Unit/Bed#: [facility] 192-94 Encounter: [id number]        ASSESSMENT & PLAN    1  Hyponatremia-acute on chronic likely multifactorial with underlying SIADH  ? Sodium is now improved to 135  ? Having some lower extremity edema so will decrease salt tablets to 1 g b i d   ? Having some hypertension as well with the edema so will start torsemide 10 mg daily  ? Work up: With a high urine osmolality normal urine sodium, a m  Cortisol was not low, TSH was acceptable, CT of the abdomen and pelvis along with CTA of the chest this year showed no evidence of malignancy  ? The patient is on trazodone, pantoprazole high-dose, Abilify, all of which could contribute to hyponatremia  ? Patient is now off Zoloft                2  Hypokalemia  ? Most recent potassiums have been 3 9-4 0  ? Currently not on any diuretics  ? Complaining of pill burden will do a trial without the potassium chloride which will likely help improve his fluid restriction     3  Primary hypertension  ? Blood pressure has been higher  ? On amlodipine 5 mg daily, carvedilol 37 5 mg 2 times daily  ? Will add torsemide 10 mg daily                4  Grade 1 diastolic dysfunction with an EF of 63% with bicuspid aortic stenosis  ? Currently on beta-blocker not on ACE-inhibitor not on a loop diuretic  ? History of an aortic aneurysm with hemiarch reconstruction in 2014     5  Paroxysmal atrial fibrillation  ? Currently on Coumadin     6  Hypercoagulable state with a history of antiphospholipid syndrome    Discussion:  Sodium now 135, positive edema, positive elevated blood pressure  Decrease salt tablets to 1 g b i d  Start torsemide 10 mg daily  Maintain on fluid restriction    Repeat BMP tomorrow      SUBJECTIVE:    Patient was seen today  Sitting in common room  Concerned about his walking    12 point review of systems was otherwise negative besides what is mentioned above     Medications:    Current Facility-Administered Medications:   •  acetaminophen (TYLENOL) tablet 650 mg, 650 mg, Oral, Q4H PRN, Mau Mckeon MD, 650 mg at 10/03/22 2150  •  acetaminophen (TYLENOL) tablet 650 mg, 650 mg, Oral, Q4H PRN, Mau Mckeon MD, 650 mg at 10/11/22 1537  •  acetaminophen (TYLENOL) tablet 975 mg, 975 mg, Oral, Q6H PRN, Mau Mckeon MD  •  aluminum-magnesium hydroxide-simethicone (MYLANTA) oral suspension 30 mL, 30 mL, Oral, Q4H PRN, Mau Mckeon MD  •  amLODIPine (NORVASC) tablet 5 mg, 5 mg, Oral, Daily, Keily Espino PA-C, 5 mg at 10/19/22 3029  •  ARIPiprazole (ABILIFY) tablet 10 mg, 10 mg, Oral, Daily, Foster Huber DO, 10 mg at 10/19/22 3964  •  atorvastatin (LIPITOR) tablet 40 mg, 40 mg, Oral, Daily With Denisse Wolff MD, 40 mg at 10/18/22 1707  •  benztropine (COGENTIN) injection 1 mg, 1 mg, Intramuscular, Q4H PRN Max 6/day, Laura Nelson MD  •  benztropine (COGENTIN) tablet 0 5 mg, 0 5 mg, Oral, Q4H PRN Max 6/day, Laura Nelson MD  •  carvedilol (COREG) tablet 37 5 mg, 37 5 mg, Oral, BID With Meals, Keily Espino PA-C, 37 5 mg at 10/19/22 0859  •  docusate sodium (COLACE) capsule 100 mg, 100 mg, Oral, HS, DONELL Long, 100 mg at 10/18/22 2107  •  famotidine (PEPCID) tablet 40 mg, 40 mg, Oral, BID, Yvette Heck PA-C, 40 mg at 10/19/22 0900  •  haloperidol lactate (HALDOL) injection 5 mg, 5 mg, Intramuscular, Q4H PRN Max 4/day, Laura Nelson MD  •  hydrOXYzine HCL (ATARAX) tablet 25 mg, 25 mg, Oral, Q6H PRN Max 4/day, Mau Mckeon MD, 25 mg at 10/13/22 1555  •  LORazepam (ATIVAN) injection 1 mg, 1 mg, Intramuscular, Q6H PRN Max 3/day, Mau Mckeon MD  •  LORazepam (ATIVAN) tablet 0 25 mg, 0 25 mg, Oral, TID, Foster Huber DO, 0 25 mg at 10/19/22 0901  •  LORazepam (ATIVAN) tablet 0 5 mg, 0 5 mg, Oral, Q6H PRN, DONELL Dumont, 0 5 mg at 10/15/22 1151  •  melatonin tablet 3 mg, 3 mg, Oral, HSLaura Janet Colon MD, 3 mg at 10/18/22 2108  •  mirtazapine (REMERON) tablet 15 mg, 15 mg, Oral, HS, Inessa Mathews DO, 15 mg at 10/18/22 2107  •  pantoprazole (PROTONIX) EC tablet 40 mg, 40 mg, Oral, BID, Yvette Heck PA-C, 40 mg at 10/19/22 0900  •  polyethylene glycol (MIRALAX) packet 17 g, 17 g, Oral, Daily PRN, Dominga Zurita MD, 17 g at 10/16/22 8245  •  propranolol (INDERAL) tablet 5 mg, 5 mg, Oral, Q8H PRN, Dominga Zurita MD, 5 mg at 10/10/22 1226  •  risperiDONE (RisperDAL) tablet 0 25 mg, 0 25 mg, Oral, Q4H PRN Max 6/day, Laura Colon MD, 0 25 mg at 10/11/22 1801  •  risperiDONE (RisperDAL) tablet 0 5 mg, 0 5 mg, Oral, Q4H PRN Max 3/day, Dominga Zurita MD  •  risperiDONE (RisperDAL) tablet 1 mg, 1 mg, Oral, Q2H PRN Max 3/day, Dominga Zurita MD  •  senna-docusate sodium (SENOKOT S) 8 6-50 mg per tablet 1 tablet, 1 tablet, Oral, Daily PRN, Dominga Zurita MD, 1 tablet at 10/16/22 7574  •  sodium chloride tablet 1 g, 1 g, Oral, BID With Meals, Sandra Esquivel DO  •  torsemide (DEMADEX) tablet 10 mg, 10 mg, Oral, Daily, Sandra Esquivel DO  •  traZODone (DESYREL) tablet 100 mg, 100 mg, Oral, HS PRN, Susan Torrse MD, 100 mg at 10/18/22 2112  •  warfarin (COUMADIN) tablet 4 mg, 4 mg, Oral, Daily (warfarin), Ethan Hernandez PA-C, 4 mg at 10/18/22 1707    OBJECTIVE:    Vitals:    10/18/22 0847 10/18/22 1525 10/18/22 2022 10/19/22 0724   BP:  142/89 133/79 141/95   BP Location:  Right arm Right arm Left arm   Pulse:  100 89 93   Resp:  16 16 16   Temp:  97 5 °F (36 4 °C) (!) 97 1 °F (36 2 °C) 97 6 °F (36 4 °C)   TempSrc:  Temporal Tympanic Temporal   SpO2:  98% 97% 98%   Weight: 87 2 kg (192 lb 3 9 oz)      Height:            Temp:  [97 1 °F (36 2 °C)-97 6 °F (36 4 °C)] 97 6 °F (36 4 °C)  HR:  [] 93  Resp:  [16] 16  BP: (133-142)/(79-95) 141/95  SpO2:  [97 %-98 %] 98 %     Body mass index is 29 23 kg/m²      Weight (last 2 days)     Date/Time Weight    10/18/22 0847 87 2 (192 24) I/O last 3 completed shifts: In: 1020 [P O :1020]  Out: -     I/O this shift:  In: 200 [P O :200]  Out: -       Physical exam:    General: no acute distress, cooperative  Eyes: conjunctivae pink, anicteric sclerae  ENT: lips and mucous membranes moist, no exudates, normal external ears  Neck: ROM intact, no JVD  Chest: No respiratory distress, no accessory muscle use  CVS: normal rate, non pericardial friction rub  Abdomen: soft, non-tender, non-distended, normoactive bowel sounds  Extremities: no edema of both legs  Skin: no rash  Neuro:  Resting tremor left hand  Psych:  Concern in the psychiatric unit    Invasive Devices:      Lab Results:   Results from last 7 days   Lab Units 10/17/22  0515 10/16/22  0956 10/16/22  0006 10/15/22  1619 10/15/22  1027 10/13/22  1348 10/13/22  0657   POTASSIUM mmol/L 3 9 4 0 3 6 4 0 3 9   < > 3 6   CHLORIDE mmol/L 99 96 98 95* 93*   < > 92*   CO2 mmol/L 27 32 29 30 30   < > 29   BUN mg/dL 12 14 16 16 14   < > 13   CREATININE mg/dL 0 92 1 06 0 98 1 09 0 96   < > 0 92   CALCIUM mg/dL 8 3* 9 1 8 1* 8 6 8 8   < > 8 7   ALK PHOS U/L  --   --   --   --   --   --  83   ALT U/L  --   --   --   --   --   --  26   AST U/L  --   --   --   --   --   --  27    < > = values in this interval not displayed  Portions of the record may have been created with voice recognition software  Occasional wrong word or "sound a like" substitutions may have occurred due to the inherent limitations of voice recognition software  Read the chart carefully and recognize, using context, where substitutions have occurred  If you have any questions, please contact the dictating provider

## 2022-10-19 NOTE — PLAN OF CARE
Problem: Ineffective Coping  Goal: Identifies ineffective coping skills  Outcome: Progressing  Goal: Identifies healthy coping skills  Outcome: Progressing  Goal: Demonstrates healthy coping skills  Outcome: Progressing  Goal: Participates in unit activities  Description: Interventions:  - Provide therapeutic environment   - Provide required programming   - Redirect inappropriate behaviors   Outcome: Progressing  Goal: Patient/Family participate in treatment and DC plans  Description: Interventions:  - Provide therapeutic environment  Outcome: Progressing  Goal: Patient/Family verbalizes awareness of resources  Outcome: Progressing  Goal: Understands least restrictive measures  Description: Interventions:  - Utilize least restrictive behavior  Outcome: Progressing  Goal: Free from restraint events  Description: - Utilize least restrictive measures   - Provide behavioral interventions   - Redirect inappropriate behaviors   Outcome: Progressing     Problem: Anxiety  Goal: Anxiety is at manageable level  Description: Interventions:  - Assess and monitor patient's anxiety level  - Monitor for signs and symptoms (heart palpitations, chest pain, shortness of breath, headaches, nausea, feeling jumpy, restlessness, irritable, apprehensive)  - Collaborate with interdisciplinary team and initiate plan and interventions as ordered  - [place] patient to unit/surroundings  - Explain treatment plan  - Encourage participation in care  - Encourage verbalization of concerns/fears  - Identify coping mechanisms  - Assist in developing anxiety-reducing skills  - Administer/offer alternative therapies  - Limit or eliminate stimulants  Outcome: Progressing     Problem: Nutrition/Hydration-ADULT  Goal: Nutrient/Hydration intake appropriate for improving, restoring or maintaining nutritional needs  Description: Monitor and assess patient's nutrition/hydration status for malnutrition   Collaborate with interdisciplinary team and initiate plan and interventions as ordered  Monitor patient's weight and dietary intake as ordered or per policy  Utilize nutrition screening tool and intervene as necessary  Determine patient's food preferences and provide high-protein, high-caloric foods as appropriate       INTERVENTIONS:  - Monitor oral intake, urinary output, labs, and treatment plans  - Assess nutrition and hydration status and recommend course of action  - Evaluate amount of meals eaten  - Assist patient with eating if necessary   - Allow adequate time for meals  - Recommend/ encourage appropriate diets, oral nutritional supplements, and vitamin/mineral supplements  - Order, calculate, and assess calorie counts as needed  - Recommend, monitor, and adjust tube feedings and TPN/PPN based on assessed needs  - Assess need for intravenous fluids  - Provide specific nutrition/hydration education as appropriate  - Include patient/family/caregiver in decisions related to nutrition  Outcome: Progressing

## 2022-10-19 NOTE — QUICK NOTE
INR subtherapeutic today at 1 33  Warfarin was decreased due to supratherapeutic INR, however INR is now subtherapeutic  Patient is currently on his outpatient dose of Warfarin 4mg daily  Per chart review, his INR was therapeutic on this dose  Will continue with Warfarin 4mg daily and bridge with lovenox until INR returns to goal of 2 0 - 3 0

## 2022-10-19 NOTE — PROGRESS NOTES
Pt  [place] to watch staff and peers in hallway rather than to fully focus on group task/topics exclusively     10/19/22 1000   Activity/Group Checklist   Group Community meeting  (energizing vs draining topic )   Attendance Attended   Attendance Duration (min) 31-45   Interactions Other (Comment)  (soft spoken,focused on watching another peer be discharged )   Affect/Mood Constricted   Goals Achieved Able to listen to others;Discussed coping strategies

## 2022-10-19 NOTE — PLAN OF CARE
Problem: Ineffective Coping  Goal: Identifies ineffective coping skills  Outcome: Progressing  Goal: Identifies healthy coping skills  Outcome: Progressing  Goal: Demonstrates healthy coping skills  Outcome: Progressing  Goal: Participates in unit activities  Description: Interventions:  - Provide therapeutic environment   - Provide required programming   - Redirect inappropriate behaviors   Outcome: Progressing  Goal: Patient/Family participate in treatment and DC plans  Description: Interventions:  - Provide therapeutic environment  Outcome: Progressing  Goal: Patient/Family verbalizes awareness of resources  Outcome: Progressing  Goal: Understands least restrictive measures  Description: Interventions:  - Utilize least restrictive behavior  Outcome: Progressing  Goal: Free from restraint events  Description: - Utilize least restrictive measures   - Provide behavioral interventions   - Redirect inappropriate behaviors   Outcome: Progressing     Problem: Anxiety  Goal: Anxiety is at manageable level  Description: Interventions:  - Assess and monitor patient's anxiety level  - Monitor for signs and symptoms (heart palpitations, chest pain, shortness of breath, headaches, nausea, feeling jumpy, restlessness, irritable, apprehensive)  - Collaborate with interdisciplinary team and initiate plan and interventions as ordered  - [place] patient to unit/surroundings  - Explain treatment plan  - Encourage participation in care  - Encourage verbalization of concerns/fears  - Identify coping mechanisms  - Assist in developing anxiety-reducing skills  - Administer/offer alternative therapies  - Limit or eliminate stimulants  Outcome: Progressing     Problem: Nutrition/Hydration-ADULT  Goal: Nutrient/Hydration intake appropriate for improving, restoring or maintaining nutritional needs  Description: Monitor and assess patient's nutrition/hydration status for malnutrition   Collaborate with interdisciplinary team and initiate plan and interventions as ordered  Monitor patient's weight and dietary intake as ordered or per policy  Utilize nutrition screening tool and intervene as necessary  Determine patient's food preferences and provide high-protein, high-caloric foods as appropriate       INTERVENTIONS:  - Monitor oral intake, urinary output, labs, and treatment plans  - Assess nutrition and hydration status and recommend course of action  - Evaluate amount of meals eaten  - Assist patient with eating if necessary   - Allow adequate time for meals  - Recommend/ encourage appropriate diets, oral nutritional supplements, and vitamin/mineral supplements  - Order, calculate, and assess calorie counts as needed  - Recommend, monitor, and adjust tube feedings and TPN/PPN based on assessed needs  - Assess need for intravenous fluids  - Provide specific nutrition/hydration education as appropriate  - Include patient/family/caregiver in decisions related to nutrition  Outcome: Progressing

## 2022-10-19 NOTE — TELEPHONE ENCOUNTER
I would recommend that the patient consider going to the emergency room weekend as he is having worsening problems with anxiety depression  He does need to see Psychiatry, but that is taking a bit to get into  Again, would recommend follow-up with ER  Retention Suture Text: Retention sutures were placed to support the closure and prevent dehiscence.

## 2022-10-20 LAB
BASOPHILS # BLD AUTO: 0.02 THOUSANDS/ÂΜL (ref 0–0.1)
BASOPHILS NFR BLD AUTO: 0 % (ref 0–1)
EOSINOPHIL # BLD AUTO: 0.07 THOUSAND/ÂΜL (ref 0–0.61)
EOSINOPHIL NFR BLD AUTO: 2 % (ref 0–6)
ERYTHROCYTE [DISTWIDTH] IN BLOOD BY AUTOMATED COUNT: 14 % (ref 11.6–15.1)
HCT VFR BLD AUTO: 38.6 % (ref 36.5–49.3)
HGB BLD-MCNC: 12.8 G/DL (ref 12–17)
IMM GRANULOCYTES # BLD AUTO: 0.02 THOUSAND/UL (ref 0–0.2)
IMM GRANULOCYTES NFR BLD AUTO: 0 % (ref 0–2)
INR PPP: 1.65 (ref 0.84–1.19)
LYMPHOCYTES # BLD AUTO: 0.65 THOUSANDS/ÂΜL (ref 0.6–4.47)
LYMPHOCYTES NFR BLD AUTO: 14 % (ref 14–44)
MCH RBC QN AUTO: 30.1 PG (ref 26.8–34.3)
MCHC RBC AUTO-ENTMCNC: 33.2 G/DL (ref 31.4–37.4)
MCV RBC AUTO: 91 FL (ref 82–98)
MONOCYTES # BLD AUTO: 0.57 THOUSAND/ÂΜL (ref 0.17–1.22)
MONOCYTES NFR BLD AUTO: 12 % (ref 4–12)
NEUTROPHILS # BLD AUTO: 3.46 THOUSANDS/ÂΜL (ref 1.85–7.62)
NEUTS SEG NFR BLD AUTO: 72 % (ref 43–75)
NRBC BLD AUTO-RTO: 0 /100 WBCS
PLATELET # BLD AUTO: 148 THOUSANDS/UL (ref 149–390)
PMV BLD AUTO: 9.8 FL (ref 8.9–12.7)
PROTHROMBIN TIME: 19.9 SECONDS (ref 11.6–14.5)
RBC # BLD AUTO: 4.25 MILLION/UL (ref 3.88–5.62)
SODIUM SERPL-SCNC: 136 MMOL/L (ref 135–147)
WBC # BLD AUTO: 4.79 THOUSAND/UL (ref 4.31–10.16)

## 2022-10-20 PROCEDURE — 97110 THERAPEUTIC EXERCISES: CPT

## 2022-10-20 PROCEDURE — 85610 PROTHROMBIN TIME: CPT | Performed by: PHYSICIAN ASSISTANT

## 2022-10-20 PROCEDURE — 99232 SBSQ HOSP IP/OBS MODERATE 35: CPT | Performed by: STUDENT IN AN ORGANIZED HEALTH CARE EDUCATION/TRAINING PROGRAM

## 2022-10-20 PROCEDURE — 85025 COMPLETE CBC W/AUTO DIFF WBC: CPT | Performed by: PHYSICIAN ASSISTANT

## 2022-10-20 PROCEDURE — 84295 ASSAY OF SERUM SODIUM: CPT

## 2022-10-20 PROCEDURE — 97116 GAIT TRAINING THERAPY: CPT

## 2022-10-20 RX ADMIN — WARFARIN SODIUM 4 MG: 2 TABLET ORAL at 17:14

## 2022-10-20 RX ADMIN — LORAZEPAM 0.25 MG: 0.5 TABLET ORAL at 17:14

## 2022-10-20 RX ADMIN — SODIUM CHLORIDE 1 G: 1 TABLET ORAL at 17:14

## 2022-10-20 RX ADMIN — LORAZEPAM 0.25 MG: 0.5 TABLET ORAL at 08:31

## 2022-10-20 RX ADMIN — AMLODIPINE BESYLATE 5 MG: 5 TABLET ORAL at 08:31

## 2022-10-20 RX ADMIN — CARVEDILOL 37.5 MG: 12.5 TABLET, FILM COATED ORAL at 08:33

## 2022-10-20 RX ADMIN — PANTOPRAZOLE SODIUM 40 MG: 40 TABLET, DELAYED RELEASE ORAL at 21:18

## 2022-10-20 RX ADMIN — SODIUM CHLORIDE 1 G: 1 TABLET ORAL at 08:32

## 2022-10-20 RX ADMIN — ENOXAPARIN SODIUM 90 MG: 100 INJECTION SUBCUTANEOUS at 21:22

## 2022-10-20 RX ADMIN — ARIPIPRAZOLE 10 MG: 10 TABLET ORAL at 08:30

## 2022-10-20 RX ADMIN — TORSEMIDE 10 MG: 20 TABLET ORAL at 08:30

## 2022-10-20 RX ADMIN — LORAZEPAM 0.25 MG: 0.5 TABLET ORAL at 21:19

## 2022-10-20 RX ADMIN — FAMOTIDINE 40 MG: 20 TABLET ORAL at 08:32

## 2022-10-20 RX ADMIN — DOCUSATE SODIUM 100 MG: 100 CAPSULE, LIQUID FILLED ORAL at 21:18

## 2022-10-20 RX ADMIN — CARVEDILOL 37.5 MG: 12.5 TABLET, FILM COATED ORAL at 17:14

## 2022-10-20 RX ADMIN — MIRTAZAPINE 37.5 MG: 30 TABLET, FILM COATED ORAL at 21:18

## 2022-10-20 RX ADMIN — MELATONIN TAB 3 MG 3 MG: 3 TAB at 21:18

## 2022-10-20 RX ADMIN — ENOXAPARIN SODIUM 90 MG: 100 INJECTION SUBCUTANEOUS at 08:33

## 2022-10-20 RX ADMIN — FAMOTIDINE 40 MG: 20 TABLET ORAL at 17:14

## 2022-10-20 RX ADMIN — PANTOPRAZOLE SODIUM 40 MG: 40 TABLET, DELAYED RELEASE ORAL at 08:32

## 2022-10-20 RX ADMIN — ATORVASTATIN CALCIUM 40 MG: 40 TABLET, FILM COATED ORAL at 17:14

## 2022-10-20 NOTE — PROGRESS NOTES
Progress Note - 2625 Neha Moses 58 y o  male MRN: [id number]  Unit/Bed#: Brittani Mcgee 876-35 Encounter: [id number]    Assessment/Plan   Principal Problem:    SHIMON (generalized anxiety disorder)  Active Problems:    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 attack    Hypertension    GERD (gastroesophageal reflux disease)    Atrial fibrillation (HCC)    Class 2 obesity in adult    History of stroke    Antiphospholipid antibody with hypercoagulable state ([facility] )    Hyperbilirubinemia    CORIE (obstructive sleep apnea)    Occasional tremors    PVD (peripheral vascular disease) (HCC)    Medical clearance for incarceration    Mild protein-calorie malnutrition (HCC)    Sinus arrhythmia    Supratherapeutic INR    Hyponatremia    SIADH (syndrome of inappropriate ADH production) ([facility] )    Subclinical hyperthyroidism      Recommended Treatment:   No psychopharmacologic changes necessary at this moment with the exception of increasing mirtazapine to 37 5mg HS for anxiety; will continue to assess daily for further optimization  Over weekend, consider decreasing Ativan to BID from TID with plan to DC  Discussed patient with cardiology  They are in agreement with Nephrology and will sign off  Nephrology following, changed sodium tabs to BID, started torsemide, maintain fluid restriction for now  Continue with pharmacotherapy, group therapy, milieu therapy and occupational therapy  Continue to assess for adverse medication side effects  Encourage Hans Christina 22 to participate in nonverbal forms of therapy including journaling and art/music therapy  Continue frequent safety checks and vitals per unit protocol  Continue to engage CM/SW to assist with collateral, disposition planning, and the implementation of an individualized, patient-centered plan of care    Continue medical management by medical team   Case discussed with treatment team     Legal Status: 201  ------------------------------------------------------------    Subjective: All documentation including nursing notes, medication history to ensure medication adherence on the unit, labs, and vitals were reviewed  Dae Curry was evaluated this morning for continuity of care and no acute distress noted throughout the evaluation  Over the past 24 hours per nursing report, Dae Curry has been cooperative on the unit and compliant with medications  Today, Dae Curry is consenting for safety on the unit  Dae Curry reports feeling "better " Dae Curry notes having adequate sleep without needing PRN trazodone  Dae Curry states having an improving appetite with reduced oral intake per nursing report  Dae Curry has been taking the medications as prescribed and reporting no side effects  We discussed and he agrees he is starting to feel that his anxiety is "getting better" but then continues to have many somatic complaints  Dae Curry denies suicidal ideations  Dae Curry denies homicidal ideations  Regarding hallucinations, Dae Curry denies and does not appear to be internally stimulated    PRNs overnight:  None   VS: Reviewed, within normal limits    Progress Toward Goals: slow improvement    Psychiatric Review of Systems:  Behavior over the last 24 hours:  improved  Sleep: improved  Appetite: improving  Medication side effects: No   ROS: all other systems are negative    Vital signs in last 24 hours:  Temp:  [96 6 °F (35 9 °C)-97 7 °F (36 5 °C)] 96 6 °F (35 9 °C)  HR:  [86-95] 95  Resp:  [16-17] 16  BP: (118-163)/(69-96) 145/93    Laboratory results:  I have personally reviewed all pertinent laboratory/tests results    Recent Results (from the past 48 hour(s))   Protime-INR    Collection Time: 10/19/22  6:35 AM   Result Value Ref Range    Protime 16 8 (H) 11 6 - 14 5 seconds    INR 1 33 (H) 0 84 - 1 19   Sodium    Collection Time: 10/19/22  6:35 AM   Result Value Ref Range    Sodium 135 135 - 147 mmol/L   Sodium    Collection Time: 10/19/22  6:35 AM   Result Value Ref Range    Sodium 134 (L) 135 - 147 mmol/L   CBC and differential    Collection Time: 10/20/22  6:35 AM   Result Value Ref Range    WBC 4 79 4 31 - 10 16 Thousand/uL    RBC 4 25 3 88 - 5 62 Million/uL    Hemoglobin 12 8 12 0 - 17 0 g/dL    Hematocrit 38 6 36 5 - 49 3 %    MCV 91 82 - 98 fL    MCH 30 1 26 8 - 34 3 pg    MCHC 33 2 31 4 - 37 4 g/dL    RDW 14 0 11 6 - 15 1 %    MPV 9 8 8 9 - 12 7 fL    Platelets 284 (L) 467 - 390 Thousands/uL    nRBC 0 /100 WBCs    Neutrophils Relative 72 43 - 75 %    Immat GRANS % 0 0 - 2 %    Lymphocytes Relative 14 14 - 44 %    Monocytes Relative 12 4 - 12 %    Eosinophils Relative 2 0 - 6 %    Basophils Relative 0 0 - 1 %    Neutrophils Absolute 3 46 1 85 - 7 62 Thousands/µL    Immature Grans Absolute 0 02 0 00 - 0 20 Thousand/uL    Lymphocytes Absolute 0 65 0 60 - 4 47 Thousands/µL    Monocytes Absolute 0 57 0 17 - 1 22 Thousand/µL    Eosinophils Absolute 0 07 0 00 - 0 61 Thousand/µL    Basophils Absolute 0 02 0 00 - 0 10 Thousands/µL   Protime-INR    Collection Time: 10/20/22  6:35 AM   Result Value Ref Range    Protime 19 9 (H) 11 6 - 14 5 seconds    INR 1 65 (H) 0 84 - 1 19   Sodium    Collection Time: 10/20/22  6:35 AM   Result Value Ref Range    Sodium 136 135 - 147 mmol/L         Mental Status Evaluation:    Appearance:  casually dressed, marginal hygiene, looks stated age, overweight, overtly appearing  male in no apparent acute distress, intermittent eye contact   Behavior:  cooperative   Speech:  normal rate and volume, stuttering during discussions on topics he is anxious about (medications, bodily functions, walking)   Mood:  "better"   Affect:  constricted, less anxious than previous   Thought Process:  less perseverative, less ruminations, less negative thinking   Associations: perseveration   Thought Content:  mild paranoia, preoccupied with medications, walking, bodily functions   Perceptual Disturbances: Denies auditory or visual hallucinations and Does not appear to be responding to internal stimuli   Risk Potential: Suicidal ideation - None at present  Homicidal ideation - None at present  Potential for aggression - Not at present   Sensorium:  oriented to person, place and time/date   Memory:  recent and remote memory grossly intact   Consciousness:  alert and awake   Attention/Concentration: attention span and concentration are age appropriate   Insight:  limited   Judgment: limited   Gait/Station: slow gait, uses walker   Motor Activity: no abnormal movements other than increase in tremor when anxious       Current Medications:  Current Facility-Administered Medications   Medication Dose Route Frequency Provider Last Rate   • acetaminophen  650 mg Oral Q4H PRN Candy Carranza MD     • acetaminophen  650 mg Oral Q4H PRN Candy Carranza MD     • acetaminophen  975 mg Oral Q6H PRN Candy Carranza MD     • aluminum-magnesium hydroxide-simethicone  30 mL Oral Q4H PRN Candy Carranza MD     • amLODIPine  5 mg Oral Daily Herman Castellon PA-C     • ARIPiprazole  10 mg Oral Daily Nhi Plummer DO     • atorvastatin  40 mg Oral Daily With Dinner Candy Carranza MD     • benztropine  1 mg Intramuscular Q4H PRN Max 6/day Candy Carranza MD     • benztropine  0 5 mg Oral Q4H PRN Max 6/day Laura Garcia MD     • carvedilol  37 5 mg Oral BID With Meals Keily Espino PA-C     • docusate sodium  100 mg Oral HS Hollice Music, CRNP     • enoxaparin  1 mg/kg Subcutaneous Q12H [facility] Duncan Lizama PA-C     • famotidine  40 mg Oral BID Duncan Lizama PA-C     • haloperidol lactate  5 mg Intramuscular Q4H PRN Max 4/day Candy Carranza MD     • hydrOXYzine HCL  25 mg Oral Q6H PRN Max 4/day Candy Carranza MD     • LORazepam  1 mg Intramuscular Q6H PRN Max 3/day Candy Carranza MD     • LORazepam  0 25 mg Oral TID Nhi Plummer DO     • LORazepam  0 5 mg Oral Q6H PRN Hollice Music, CRNP     • melatonin  3 mg Oral HS Laura Garcia MD     • mirtazapine  30 mg Oral HS Mary Fernandez, DO     • pantoprazole  40 mg Oral BID Karina Jose PA-C     • polyethylene glycol  17 g Oral Daily PRN Lindy Oneill MD     • propranolol  5 mg Oral Q8H PRN Lindy Oneill MD     • risperiDONE  0 25 mg Oral Q4H PRN Max 6/day Laura Gentile MD     • risperiDONE  0 5 mg Oral Q4H PRN Max 3/day Lindy Oneill MD     • risperiDONE  1 mg Oral Q2H PRN Max 3/day Lindy Oneill MD     • senna-docusate sodium  1 tablet Oral Daily PRN Lindy Oneill MD     • sodium chloride  1 g Oral BID With Meals Quyen Min DO     • torsemide  10 mg Oral Daily Quyen Min DO     • traZODone  100 mg Oral HS PRN Donna Reinoso MD     • warfarin  4 mg Oral Daily (warfarin) Karina Jose PA-C         Suicide/Homicide Risk Assessment:  Risk of Harm to Self:   Based on today's assessment, Janneth Sai presents the following risk of harm to self: high    Risk of Harm to Others:  Based on today's assessment, Janneth Gibbs presents the following risk of harm to others: low    The following interventions are recommended: behavioral checks every 7 minutes, continued hospitalization on locked unit    Behavioral Health Medications: All current active meds have been reviewed  Changes as in plan section above  Risks, benefits and possible side effects of Medications:   Risks, benefits, and possible side effects of medications explained to patient and patient verbalizes understanding  Counseling / Coordination of Care:  Patient's progress discussed with staff in treatment team meeting  Medications, treatment progress and treatment plan reviewed with patient  Mary Fernandez 10/20/22  Psychiatry Resident, PGY-II    This note was completed in part utilizing M-Modal Fluency Direct Software   Grammatical, translation, syntax errors, random word insertions, spelling mistakes, and incomplete sentences may be an occasional consequence of this system secondary to software limitations with voice recognition, ambient noise, and hardware issues  If you have any questions or concerns about the content, text, or information contained within the body of this dictation, please contact the provider for clarification

## 2022-10-20 NOTE — TREATMENT TEAM
10/20/22 1205   Service   Service SLIM   Provider Name Dwaine Chavez, [facility]   Multi-disciplinary Rounds   Diagnosis  Reviewed   Pending Pathology and Pertinent Tests Most recent lab data reviewed   Notified recent  via [employer] Text

## 2022-10-20 NOTE — NURSING NOTE
Pt alert and visible on the unit  Remains anxious and tremors noted on left  Reported depression 5/10 and anxiety "climbing"/4  Maintained on 1500cc fluid restriction  Continues to use walker for ambulation  Speech is pressured  Appetite 75% for meals  Accepted all medication as ordered  Will continue to monitor and maintain q 7 min checks

## 2022-10-20 NOTE — TREATMENT TEAM
10/20/22 0856   Team Meeting   Meeting Type Daily Rounds   Initial Conference Date 10/20/22   Team Members Present   Team Members Present Physician;Nurse;;; Occupational Therapist   Physician Team Member Dr Debra Avelar; Dr Christian Tena; Dr Nba Torres Management Team Member [address] [place] [address] Work Team Member Dori   OT Team Member Claudell Romp   Patient/Family Present   Patient Present No   Patient's Family Present No     Med compliant, visible, slept, 2/4 anxiety, 2/10 depression, fluid restriction, somatically preoccupied, Remeron increased

## 2022-10-20 NOTE — PLAN OF CARE
Pt  Is reflecting that he feels some relief but continues to have overall worry in his life  Pt  is attending groups    Problem: Ineffective Coping  Goal: Participates in unit activities  Description: Interventions:  - Provide therapeutic environment   - Provide required programming   - Redirect inappropriate behaviors   Outcome: Progressing

## 2022-10-20 NOTE — NURSING NOTE
Patient visible in dayroom, pt remains anxious  Pt was stating "I don't take ativan, "I was supposed to be done taking it" Pt did take medications  Reports 2/4 anxiety and 4/10 depression  Denies si,HI,AVH  Pt has a new order for Lovenox injections and stated "I can't take that, "I already take Coumadin" Pt was educated on why it was ordered  He agreed, and after it was given patient states "You killed me, "I'm dead"  Medication complaint, no s/s of distress noted

## 2022-10-20 NOTE — PLAN OF CARE
Problem: Ineffective Coping  Goal: Identifies healthy coping skills  Outcome: Progressing     Problem: Ineffective Coping  Goal: Free from restraint events  Description: - Utilize least restrictive measures   - Provide behavioral interventions   - Redirect inappropriate behaviors   Outcome: Progressing     Problem: Ineffective Coping  Goal: Understands least restrictive measures  Description: Interventions:  - Utilize least restrictive behavior  Outcome: Progressing     Problem: Nutrition/Hydration-ADULT  Goal: Nutrient/Hydration intake appropriate for improving, restoring or maintaining nutritional needs  Description: Monitor and assess patient's nutrition/hydration status for malnutrition  Collaborate with interdisciplinary team and initiate plan and interventions as ordered  Monitor patient's weight and dietary intake as ordered or per policy  Utilize nutrition screening tool and intervene as necessary  Determine patient's food preferences and provide high-protein, high-caloric foods as appropriate       INTERVENTIONS:  - Monitor oral intake, urinary output, labs, and treatment plans  - Assess nutrition and hydration status and recommend course of action  - Evaluate amount of meals eaten  - Assist patient with eating if necessary   - Allow adequate time for meals  - Recommend/ encourage appropriate diets, oral nutritional supplements, and vitamin/mineral supplements  - Order, calculate, and assess calorie counts as needed  - Recommend, monitor, and adjust tube feedings and TPN/PPN based on assessed needs  - Assess need for intravenous fluids  - Provide specific nutrition/hydration education as appropriate  - Include patient/family/caregiver in decisions related to nutrition  Outcome: Progressing

## 2022-10-20 NOTE — DISCHARGE SUMMARY
Discharge Summary - 26[address] Neha Moses 58 y o  male MRN: [id number]  Unit/Bed#: Marianne Mclaughlin 795-57 Encounter: [id number]     Admission Date:   Admission Orders (From admission, onward)     Ordered        09/29/22 1202  ED TO SAME [facility] UNIT (using Admission Navigator) - Admit Patient to [address] [place] [address]  Once                            Discharge Date: 10/26/22     Attending Psychiatrist:  True Byrd MD    Admission Diagnosis:    Principal Problem:    SHIMON (generalized anxiety disorder)  Active Problems:    Hypertension    GERD (gastroesophageal reflux disease)    Atrial fibrillation ([facility] )    Class 2 obesity in adult    History of stroke    Antiphospholipid antibody with hypercoagulable state ([facility] )    Hyperbilirubinemia    CORIE (obstructive sleep apnea)    Occasional tremors    PVD (peripheral vascular disease) ([facility] )    Medical clearance for incarceration    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 attack    Mild protein-calorie malnutrition (HCC)    Sinus arrhythmia    Supratherapeutic INR    Hyponatremia    SIADH (syndrome of inappropriate ADH production) ([facility] )    Subclinical hyperthyroidism      Discharge Diagnosis:     Principal Problem:    SHIMON (generalized anxiety disorder)  Active Problems:    Hypertension    GERD (gastroesophageal reflux disease)    Atrial fibrillation ([facility] )    Class 2 obesity in adult    History of stroke    Antiphospholipid antibody with hypercoagulable state ([address] )    Hyperbilirubinemia    CORIE (obstructive sleep apnea)    Occasional tremors    PVD (peripheral vascular disease) (HCC)    Medical clearance for incarceration    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 attack    Mild protein-calorie malnutrition (HCC)    Sinus arrhythmia    Supratherapeutic INR    Hyponatremia    SIADH (syndrome of inappropriate ADH production) ([facility] )    Subclinical hyperthyroidism  Resolved Problems:    * No resolved hospital problems  *      Reason for Admission/HPI:   Patient is a 58year old male, , no children, lives with sister, unemployed at this time  PPH of SHIMON, recently established care with Dr Luna Hodges;  FH of anxiety; No known history of  illicit drug or alcohol use, no suicide attempt, No Hx of violence, patient has multiple cardiac and medical problems including (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, s/p aortic aneurysm, Bicuspid aortic stenosis, antiphospholipid antibody  with hypercoagulable state, PVD , paroxysmal Afib , HTN, HLD, CORIE, Lower back pain        Admitted on 201 after he was referred by he Psychiatrist for management of severe anxiety and inability to care for self   For more detail, please refer to the full H&P completed by psychiatrist Debra Casey DO on 9/30/22:  "Shay Mccarthy is a 58 y o  overtly appearing male, , with a PPHx of anxiety, and extensive PMHx of several lacunar strokes in 2014, 2019 2021, status post aortic aneurysm replacement, bicuspid aortic stenosis, anti phospholipid antibody with hypercoagulable state, PVD, paroxysmal AFib, primary hypertension, hyperlipidemia, CORIE, renal cyst, low back pain, weakness, tremor post CVA, who presented to [facility] [place] due to increased anxiety with panic attacks  Patient was admitted to the [facility] Unit on a voluntary 201 commitment basis due to anxiety and Inability to care for self, unsuccessful outpatient treatment, referral from outpatient psychiatrist to inpatient treatment      Symptoms prior to admission included poor appetite, difficulty sleeping, erratic behavior, anxiety symptoms, anxiety attacks and Inability to make decisions, as well as stuttering  Onset of symptoms was abrupt starting In August of 2022 when he had exacerbation of medical issues:  bright red blood per rectum with Progressively rapidly worsening course since that time  Stressors preceding admission included Medical issues, damian Mendoza stated that most of this started back in 2014 when he needed open heart surgery for an ascending aortic aneurysm  Patient at that time per the notes admitted in situations that he was going to end his life as opposed to wait for surgery and she was involuntarily committed for “few days”  Other than this, patient has no past inpatient psychiatric history       On initial evaluation after admission to the inpatient psychiatric unit, Lorena Garcia would stutter, repeat himself several times  Patient states that most recently he had bright red blood per rectum in the beginning of August which triggered a cascade of generalized anxiety  Patient states that the following couple of days he started having chest pains and thought that he was having a heart attack and went to the emergency department to take care of this  Additionally patient had been having some head complaints and went to the hospital due to the fact that he has had a stroke before and needed to get a scan  Patient states that “the machine was broken it took like 2 weeks for them to fix it” and this also contributed to his increasing anxiety  Patient was confirmed to not be having a heart attack and states that it had been a panic attack instead  Since this period of time, patient states that he has had increasing feelings of anxiety and worry about everything around him to the point where he can no longer make decisions as simple as going to the grocery store or going to the bathroom        Depression:  Patient states that he does not feel depressed and denies feelings of sadness, guilt, hopelessness, worthlessness  Patient states he has had decreased sleep due to anxiety and he has been feeling “restless”  Patient denies having decreased interest in hobbies also stating “I have no hobbies and “  Patient states he has had a decrease in his appetite and no changes in memory or concentration    Patient denies SI     Hannah:  Patient denies grandiosity, flight of ideas, sleep deficits, pressured speech      Psychosis and paranoia/delusions:    Patient denies feeling that he receives thoughts from others, that people are out to get him  Patient denies AVH  Patient does endorse some disorganized thought process as well as speech as he states that his sister is his Mary Fee however this is on the context of when asked multiple questions, patient begins to be more anxious and starts stuttering, starts catastrophizing      OCD:  Patient denies compulsions however endorses obsessive thoughts of worry      Patient states that he has a baseline tremor left over from 1 of his strokes however he during this interview and over the last couple of weeks has started to stutter, repeat himself and had difficulty making decisions        Anxiety:  Patient stated that his anxiety started in 2014 when he became very anxious about the prospect of having open heart surgery due to an ascending aortic aneurysm  Patient at that point time spent some time in a psychiatric hospital due to making statements that appeared to be life threatening statements against himself  Patient identifies as a “worrier” and it is clear by the conversation, patient has obsessive thoughts but does not engage in compulsions as related to OCD, patient additionally states that he has many topics that he worries about  When asked about sleep, patient states “well I get worried if I go to sleep too early I will be up by midnight and then I will have nothing to do  I worry about the lights that are on  We have for lighting to keep from falling and it makes my room look like a runway”  Patient is currently ruminating about the fact that he feels “constipated” and states that he would like to go to the bathroom “before utilize block me up”    Patient states that recently he has felt “restless” and that he has felt “on edge” patient states that he is having trouble relaxing, making decisions, worrying about the future, catastrophizing  Patient states that he was given some Ativan for this however he does not like the Ativan  Additionally, patient has been taking Remeron which has not been helping him substantially      Past psychiatric history: This would be patient's 2nd psychiatric hospitalization  Patient saw Dr Elvira Damon in outpatient in September of 2022 for the 1st time and Dr Elvira Damon recommended that he come to the hospital for inpatient psychiatric treatment  No previous outpatient psychiatric treatment  Referring to the medications that we may prescribe him  When asked about previous medications, patient begins to be anxious stating “I can not defend myself”     Social:  Patient lives with his sister  Patient is currently on disability, has 3 years of college, no current legal issues, denies  service, 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 and it is currently “scared”   has no children recently the stressors have included medical complications and finances      Family:  Patient is unsure about her abuse history in his family and states that his sister has anxiety/depression     Substance use history:  Unremarkable     Patient currently denies SI, HI, AVH     Stressors:  · Exacerbation of medical conditions  · A snowballing effect of underlying anxiety  · Financial difficulties     Patient Strengths: average or above intelligence, cooperative, family ties, general fund of knowledge, good support system, motivation for treatment/growth, patient is on a voluntary commitment      Patient Barriers: chronic mental illness, difficulty adapting, medical problems, no/few hobbies or interests, self-care deficit"      Past Medical History:   Diagnosis Date   • Aneurysm of thoracic aorta ([facility] )     last assessed 11/20/17   • Anxiety     currently on no meds   • Arthritis    • Bilateral inguinal hernia    • Cardiac disease    • Cognitive impairment    • CVA (cerebral vascular accident) ([facility] )    • Depression    • GERD (gastroesophageal reflux disease)    • Hearing loss of aging    • Heart disease    • Hyperlipidemia    • Hypertension    • Irritable bowel syndrome    • Kidney stone    • Obesity    • Occasional tremors     left arm since stroke   • Palpitations    • Panic attack    • PONV (postoperative nausea and vomiting)     and headache x 3 days   • Psychiatric illness    • Sciatica     right and left  side   • Shortness of breath     on exertion   • Sleep apnea     is not using a CPAP   • Sleep difficulties    • Spitting up blood 05/21/2021    Resolved   • Status post placement of implantable loop recorder    • Stroke ([facility] ) 11/2014   • Stroke ([facility] ) 03/2021   • TIA (transient ischemic attack)      Past Surgical History:   Procedure Laterality Date   • AORTA SURGERY      thoracic - aneurysmorrhaphy   • APPENDECTOMY     • ASCENDING AORTIC ANEURYSM REPAIR      resolved 8/19/15   • CARDIAC LOOP RECORDER     • CHOLECYSTECTOMY      laparoscopic   • COLONOSCOPY     • CYSTOSCOPY      with removal of object- stent removal   • KNEE ARTHROSCOPY Right 1996    with medial meniscus repair   • LITHOTRIPSY      renal   • ORTHOPEDIC SURGERY     • [place] FRAGMENT KIDNEY STONE/ ESWL Left 5/17/2018    Procedure: LITHROTRIPSY EXTRACORPORAL SHOCKWAVE (ESWL); Surgeon: Vernal Castleman, MD;  Location: [facility];  Service: Urology   • [place] Ame[address] Bilateral 12/9/2021    Procedure: ROBOTIC REPAIR HERNIA INGUINAL;  Surgeon: Jayla Wylie MD;  Location: [facility];  Service: General   • THUMB ARTHROSCOPY Right 1976    ligament repair   • UPPER GASTROINTESTINAL ENDOSCOPY         Medications: All current active medications have been reviewed  Allergies: Allergies   Allergen Reactions   • Losartan Angioedema   • Aspirin Fatigue     Due to blood thinners     • Bactrim [Sulfamethoxazole-Trimethoprim]    • Eliquis [Apixaban] Other (See Comments)     Failed   Had embolic CVA   • Lisinopril      Felt bad, was OK with Zestril brand name  • Tramadol        Please refer to the initial H&P for full details  Vital signs in last 24 hours:    Temp:  [96 8 °F (36 °C)-98 1 °F (36 7 °C)] 96 8 °F (36 °C)  HR:  [] 105  Resp:  [16] 16  BP: (110-118)/(66-77) 118/77      Intake/Output Summary (Last 24 hours) at 10/26/2022 1656  Last data filed at 10/26/2022 1235  Gross per 24 hour   Intake 480 ml   Output --   Net 480 ml         Hospital Course: On admission, Lorena Garcia was admitted to the inpatient psychiatric unit and started on Behavioral Health checks every 7 minutes  During the hospitalization he was encouraged to attend individual therapy, group therapy, milieu therapy and occupational therapy  Upon admission he was seen by medical service for medical clearance for inpatient treatment and medical follow up  Initially , Lorena Garcia was started on sertraline titrated up to 200mg daily  for overwhelming anxiety, Abilify 10 mg for augmentation, continue Ativan  to help with anxiety,  Mirtazapine was tapered and discontinued while sertraline was increased  Prior to beginning of treatment medications risks and benefits and possible side effects including risk of parkinsonian symptoms, Tardive Dyskinesia and metabolic syndrome related to treatment with antipsychotic medications were reviewed with Lorena Garcia verbalized understanding and agreement for treatment  Lorena Garcia  developed hyponatremia in part due to sertraline  Sertraline was therefore discontinued and mirtazapine was restarted and titrated to a therapeutic dose for patient  The patient's mood brightened over the course of treatment, and he was seen in [facility] interacting appropriately with peers  Lorena Garcia did not demonstrate dangerous behavior to self or others during his inpatient stay  Patient initially presented with increased stuttering, inability to make any decision for self at home, continue to having worries about defecation, urination, medications   Patient BP became elevated with improper control and with hx of strokes and other CV complications, cardiology consulted  Patient found to have hyperthyroidism as well - endo consulted and will f/u outpatient  Over the course of treatment, stable to up titrate on his Zoloft and decrease the Ativan  Patient developed hyponatremia during course of stay and Zoloft needed to be discontinued  Restarted patient on mirtazapine and continue to titrate  Nephrology on board monitoring sodium, blood pressure and patient stabilized  Over the course of his stay, patient additionally began to have increasing somatic symptoms and began using a walker  PT worked with patient  Patient BP stabilized with medical intervention, hyponatremia improved with the change of medication and nephrology intervention of fluid restriction and salt tabs - both of which patient perseverated over  Patient had a prolonged course of stay in part due to medical complications and because his trigger for SHIMON is medical complications: patient never had severe SHIMON or psych hospitalization before 2014 when he was about to go in for aortic aneurysm repair  Patient gradually improved with minimal doses of ativan and maximizing mirtazapine, sleep improved as well  Patient continues to perseverate but overall appears brighter, stutters less, and is able to make decisions, although he remains afraid - but he is more able to function than previous  Patient was given abilify due to the borderline psychotic nature of his anxiety and for the augmentation of his anti anxiety medication  On the day of discharge, he denied suicidal ideation, intent or plan at the time of discharge and denied homicidal ideation, intent or plan at the time of discharge  Yang Mejia was doing well at the end of the hospitalization, treatment team felt that Yang Mejia could be safely discharged to outpatient care   The outpatient follow up with a psychiatrist was arranged by the unit  upon discharge  Dr Susan Kiran on October 31 at [place] [id number] Medications: all current active meds have been reviewed  Discharge on Two Antipsychotic Medications: No    Labs/Imaging:   I have personally reviewed all pertinent laboratory/tests results  Mental Status at time of Discharge:   Appearance:  age appropriate, dressed appropriately, looks stated age, sitting comfortably in chair, adequate hygiene and grooming, cooperative with interview, good eye contact    Behavior:  cooperative   Speech:  normal rate, normal volume, normal pitch, fluent, clear and coherent   Mood:  Anxious about not being able to walk, (but observed ambulating fine)   Affect:  normal and mood-congruent   Language Within normal limits   Thought Process:  organized, logical, goal directed, normal rate of thoughts   Associations: intact associations, perseverative      Thought Content:  No verbalized delusions   Perceptual Disturbances: Denies auditory or visual hallucinations   Risk Potential: Denies suicidal or homicidal ideation, intent, or plan   Sensorium:  person, place, time and current situation   Cognition:  Grossly intact   Consciousness:  alert and awake   Attention: attention span and concentration were age appropriate   Insight:  fair   Judgment: fair   Intellect appears to be of average intelligence   Gait/Station: normal gait/station   Motor Activity: no abnormal movements       I reviewed with Rodolfo Reddy the importance of compliance with medications and outpatient treatment after discharge , I discussed the medication regimen and possible side effects of the medications with Rodolfo Reddy prior to discharge  At the time of discharge he was tolerating psychiatric medications  , I discussed outpatient follow up with Rodolfo Reddy  and I reviewed with Rodolfo Reddy crisis plan and safety plan upon discharge      Discharge Medications:   - Mirtazapine 45mg daily   - Abilify 5mg daily AM    -  Melatonin 5mg HS   - ativan 0 25mg daily at bedtime See list above, as well as the after visit summary for all reconciled discharge medications provided to patient and family  Discharge instructions/Information to patient and family:   See after visit summary for information provided to patient and family  Provisions for Follow-Up Care:  See after visit summary for information related to follow-up care and any pertinent home health orders          Stewart Fernandes 10/26/22

## 2022-10-20 NOTE — PROGRESS NOTES
Pt attended positive reflection group  Scant responses  Pt talking to self at times with hand movements  ERICKSON info also discussed  10/20/22 5600   Activity/Group Checklist   Group Other (Comment)  (positive reflection group)   Attendance Attended   Attendance Duration (min) 31-45   Interactions Other (Comment)  (scant)   Affect/Mood Other (Comment)  (responding)   Goals Achieved Able to listen to others; Able to engage in interactions; Discussed self-esteem issues; Discussed coping strategies

## 2022-10-20 NOTE — PROGRESS NOTES
10/20/22 1000 10/20/22 1100   Activity/Group Checklist   Attendance Attended Attended   Attendance Duration (min) 0-15 0-15   Interactions Interacted appropriately Other (Comment)  (Pt  engaged in completing a relapse prevention plan with staff assistance  Pt  tremouous until pen touched it down on table and was able to clearly write his name )   Affect/Mood Other (Comment)  (Pt  reported feeling slightly calmer ) Wide   Goals Achieved Able to listen to others; Able to engage in interactions Able to listen to others; Identified resources and support systems; Able to engage in interactions; Discussed coping strategies; Identified feelings

## 2022-10-21 LAB
INR PPP: 1.66 (ref 0.84–1.19)
PROTHROMBIN TIME: 20 SECONDS (ref 11.6–14.5)
SODIUM SERPL-SCNC: 137 MMOL/L (ref 135–147)

## 2022-10-21 PROCEDURE — 97530 THERAPEUTIC ACTIVITIES: CPT

## 2022-10-21 PROCEDURE — 99232 SBSQ HOSP IP/OBS MODERATE 35: CPT | Performed by: STUDENT IN AN ORGANIZED HEALTH CARE EDUCATION/TRAINING PROGRAM

## 2022-10-21 PROCEDURE — 99232 SBSQ HOSP IP/OBS MODERATE 35: CPT | Performed by: INTERNAL MEDICINE

## 2022-10-21 PROCEDURE — 84295 ASSAY OF SERUM SODIUM: CPT

## 2022-10-21 PROCEDURE — 85610 PROTHROMBIN TIME: CPT | Performed by: PHYSICIAN ASSISTANT

## 2022-10-21 RX ORDER — LORAZEPAM 0.5 MG/1
0.25 TABLET ORAL 2 TIMES DAILY
Status: DISCONTINUED | OUTPATIENT
Start: 2022-10-21 | End: 2022-10-22

## 2022-10-21 RX ORDER — WARFARIN SODIUM 2 MG/1
1 TABLET ORAL
Status: COMPLETED | OUTPATIENT
Start: 2022-10-21 | End: 2022-10-21

## 2022-10-21 RX ADMIN — ENOXAPARIN SODIUM 90 MG: 100 INJECTION SUBCUTANEOUS at 21:11

## 2022-10-21 RX ADMIN — CARVEDILOL 37.5 MG: 12.5 TABLET, FILM COATED ORAL at 17:02

## 2022-10-21 RX ADMIN — ATORVASTATIN CALCIUM 40 MG: 40 TABLET, FILM COATED ORAL at 17:02

## 2022-10-21 RX ADMIN — SODIUM CHLORIDE 1 G: 1 TABLET ORAL at 08:48

## 2022-10-21 RX ADMIN — PANTOPRAZOLE SODIUM 40 MG: 40 TABLET, DELAYED RELEASE ORAL at 08:48

## 2022-10-21 RX ADMIN — DOCUSATE SODIUM 100 MG: 100 CAPSULE, LIQUID FILLED ORAL at 21:11

## 2022-10-21 RX ADMIN — LORAZEPAM 0.25 MG: 0.5 TABLET ORAL at 17:02

## 2022-10-21 RX ADMIN — FAMOTIDINE 40 MG: 20 TABLET ORAL at 17:02

## 2022-10-21 RX ADMIN — ARIPIPRAZOLE 10 MG: 10 TABLET ORAL at 08:45

## 2022-10-21 RX ADMIN — PANTOPRAZOLE SODIUM 40 MG: 40 TABLET, DELAYED RELEASE ORAL at 21:11

## 2022-10-21 RX ADMIN — LORAZEPAM 0.25 MG: 0.5 TABLET ORAL at 08:45

## 2022-10-21 RX ADMIN — WARFARIN SODIUM 1 MG: 2 TABLET ORAL at 17:33

## 2022-10-21 RX ADMIN — ENOXAPARIN SODIUM 90 MG: 100 INJECTION SUBCUTANEOUS at 08:53

## 2022-10-21 RX ADMIN — MELATONIN TAB 3 MG 3 MG: 3 TAB at 21:11

## 2022-10-21 RX ADMIN — TORSEMIDE 10 MG: 20 TABLET ORAL at 08:47

## 2022-10-21 RX ADMIN — CARVEDILOL 37.5 MG: 12.5 TABLET, FILM COATED ORAL at 08:45

## 2022-10-21 RX ADMIN — FAMOTIDINE 40 MG: 20 TABLET ORAL at 08:48

## 2022-10-21 RX ADMIN — AMLODIPINE BESYLATE 5 MG: 5 TABLET ORAL at 08:44

## 2022-10-21 RX ADMIN — WARFARIN SODIUM 4 MG: 2 TABLET ORAL at 17:02

## 2022-10-21 RX ADMIN — MIRTAZAPINE 37.5 MG: 30 TABLET, FILM COATED ORAL at 21:11

## 2022-10-21 NOTE — PHYSICAL THERAPY NOTE
Physical TherapyTreatment Note    Patient's Name: Spencer Kaminski    Admitting Diagnosis  Anxiety [F41 9]  Paroxysmal atrial fibrillation ([facility] [address] ) [I48 0]  Depression [F32  A]  Anticoagulated on Coumadin [Z79 01]  Encounter for psychological evaluation [Z00 8]    Problem List  Patient Active Problem List   Diagnosis    Numbness    H/O aortic aneurysm repair    Hypertension    GERD (gastroesophageal reflux disease)    SHIMON (generalized anxiety disorder)    Atrial fibrillation (HCC)    Bright red blood per rectum    Constipation    Irritable bowel syndrome    Hyperlipidemia    Kidney stones    Peyronie disease    Vitamin D deficiency    Class 2 obesity in adult    Other viral warts    Physical deconditioning    History of stroke    Antiphospholipid antibody with hypercoagulable state ([facility] [address] )    Hyperbilirubinemia    CORIE (obstructive sleep apnea)    Anticoagulated on Coumadin    Occasional tremors    Weakness of both lower extremities    Gastric polyps    Gastric AVM    Edema of both lower legs    Non-recurrent bilateral inguinal hernia without obstruction or gangrene    S/P laparoscopic hernia repair    Low back pain    Alkaline phosphatase elevation    Right inguinal pain    Ataxia    CVA (cerebral vascular accident) ([facility] [address] )    PVD (peripheral vascular disease) ([facility] [address] )    Renal cyst    Atypical chest pain    Hypokalemia    Dizziness    Medical clearance for incarceration    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 attack    Mild protein-calorie malnutrition ([facility] [address] )    Sinus arrhythmia    Supratherapeutic INR    Hyponatremia    SIADH (syndrome of inappropriate ADH production) ([facility] [address] )    Subclinical hyperthyroidism       Past Medical History  Past Medical History:   Diagnosis Date    [facility] )     last assessed 11/20/17    Anxiety     currently on no meds    Arthritis     Bilateral inguinal hernia     Cardiac disease     Cognitive impairment     CVA (cerebral vascular accident) ([facility] ) Depression     GERD (gastroesophageal reflux disease)     Hearing loss of aging     Heart disease     Hyperlipidemia     Hypertension     Irritable bowel syndrome     Kidney stone     Obesity     Occasional tremors     left arm since stroke    Palpitations     Panic attack     PONV (postoperative nausea and vomiting)     and headache x 3 days    Psychiatric illness     Sciatica     right and left  side    Shortness of breath     on exertion    Sleep apnea     is not using a CPAP    Sleep difficulties     Spitting up blood 05/21/2021    Resolved    Status post placement of implantable loop recorder     Stroke ([facility] [address] ) 11/2014    Stroke ([address] ) 03/2021    TIA (transient ischemic attack)        Past Surgical History  Past Surgical History:   Procedure Laterality Date    AORTA SURGERY      thoracic - aneurysmorrhaphy    APPENDECTOMY      ASCENDING AORTIC ANEURYSM REPAIR      resolved 8/19/15    CARDIAC LOOP RECORDER      CHOLECYSTECTOMY      laparoscopic    COLONOSCOPY      CYSTOSCOPY      with removal of object- stent removal    KNEE ARTHROSCOPY Right 1996    with medial meniscus repair    LITHOTRIPSY      renal    ORTHOPEDIC SURGERY      SD FRAGMENT KIDNEY STONE/ ESWL Left 5/17/2018    Procedure: LITHROTRIPSY EXTRACORPORAL SHOCKWAVE (ESWL); Surgeon: Shreya Marshall MD;  Location: [facility];  Service: Urology    SD Ameena Sandrae 19 Bilateral 12/9/2021    Procedure: ROBOTIC REPAIR HERNIA INGUINAL;  Surgeon: Yen Bardales MD;  Location: [facility];  Service: General    THUMB ARTHROSCOPY Right 1976    ligament repair    UPPER GASTROINTESTINAL ENDOSCOPY         Recent Imaging  US right upper quadrant   Final Result by Alejandra Townsend MD (10/12 0503)      No acute process identified  Workstation performed: [id number]         US thyroid   Final Result by Juan Addison MD (10/10 )      Normal examination  Reference: ACR Thyroid Imaging, Reporting and Data System (TI-RADS):  White Paper of the ACR TI-RADS Committee  J AM Nile Radiol 1642;26:[id number]  (additional recommendations based on American Thyroid Association 2015 guidelines )         Workstation performed: [id number]             Recent Vital Signs  Vitals:    10/19/22 2105 10/20/22 0745 10/20/22 1528 10/20/22 2015   BP: 118/69 145/93 118/76 124/71   BP Location: Right arm Right arm Left arm Right arm   Pulse: 86 95 93 86   Resp: 17 16 16 18   Temp: 97 5 °F (36 4 °C) (!) 96 6 °F (35 9 °C) 97 5 °F (36 4 °C) 97 7 °F (36 5 °C)   TempSrc: Temporal Temporal Temporal Temporal   SpO2: 98% 96% 98% 98%   Weight:       Height:            10/20/22 1500   PT Last Visit   PT Visit Date 10/20/22   Note Type   Note Type Treatment   Pain Assessment   Pain Assessment Tool 0-10   Pain Score No Pain   Restrictions/Precautions   Other Precautions Cognitive   General   Chart Reviewed Yes   Response to Previous Treatment Patient with no complaints from previous session  Family/Caregiver Present No   Cognition   Overall Cognitive Status Impaired   Transfers   Sit to Stand 5  Supervision   Stand to Sit 5  Supervision   Ambulation/Elevation   Gait pattern Step through pattern;Decreased toe off;Decreased heel strike; Shuffling   Gait Assistance 5  Supervision   Additional items Verbal cues   Assistive Device Rolling walker   Distance 100ft   Balance   Static Sitting Fair +   Dynamic Sitting Fair +   Static Standing Fair   Dynamic Standing Fair   Ambulatory Fair   Endurance Deficit   Endurance Deficit Yes   Endurance Deficit Description fatigue   Activity Tolerance   Activity Tolerance Patient limited by fatigue   Medical Staff Made Aware spoke to CM   Nurse Made Aware spoke to RN   Exercises   Heelslides 5 reps   Hip Flexion 5 reps   Hip Abduction 5 reps   Hip Adduction 5 reps   Knee AROM Long Arc Quad 5 reps   Ankle Pumps 5 reps   Assessment   Prognosis Good   Problem List Decreased strength;Decreased endurance; Impaired balance;Decreased mobility; Decreased coordination;Decreased cognition; Impaired sensation;Pain   Assessment Pt is making progress with ambulation and actiity tolerance able to complete some LE exercise today  Continues to be self limiting however much less negative comments today vs IE  Barriers to Discharge Inaccessible home environment;Decreased caregiver support   Goals   Patient Goals to keep getting stronger   STG Expiration Date 10/24/22   Short Term Goal #1 see eval note   PT Treatment Day 2   Plan   Treatment/Interventions ADL retraining;Functional transfer training;LE strengthening/ROM; Elevations; Therapeutic exercise; Endurance training;Patient/family training;Equipment eval/education; Bed mobility;Gait training;Spoke to nursing;Spoke to case management;OT   Progress Progressing toward goals   PT Frequency 2-3x/wk   Recommendation   PT Discharge Recommendation Home with home health rehabilitation   Equipment Recommended [address]   Turning in Bed Without Bedrails 3   Lying on Back to Sitting on Edge of Flat Bed 3   Moving Bed to Chair 3   Standing Up From Chair 3   Walk in Room 3   Climb 3-5 Stairs 3   Basic Mobility Inpatient Raw Score 18   Basic Mobility Standardized Score 41 05   Highest Level Of Mobility   JH-HLM Goal 6: Walk 10 steps or more   JH-HLM Achieved 7: Walk 25 feet or more   Education   Education Provided Mobility training;Assistive device   Patient Reinforcement needed; Explanation/teachback used   End of Consult   Patient Position at End of Consult Bedside chair; All needs within reach [facility] PT, DPT

## 2022-10-21 NOTE — PROGRESS NOTES
Progress Note - [address] Neha Moses 58 y o  male MRN: [id number]  Unit/Bed#: Oscar Beckman 479-72 Encounter: [id number]    Assessment/Plan   Principal Problem:    SHIMON (generalized anxiety disorder)  Active Problems:    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 attack    Hypertension    GERD (gastroesophageal reflux disease)    Atrial fibrillation (HCC)    Class 2 obesity in adult    History of stroke    Antiphospholipid antibody with hypercoagulable state ([facility] )    Hyperbilirubinemia    CORIE (obstructive sleep apnea)    Occasional tremors    PVD (peripheral vascular disease) ([facility])    Medical clearance for incarceration    Mild protein-calorie malnutrition (HCC)    Sinus arrhythmia    Supratherapeutic INR    Hyponatremia    SIADH (syndrome of inappropriate ADH production) ([facility] )    Subclinical hyperthyroidism      Recommended Treatment:   No psychopharmacologic changes necessary at this moment with the exception of reducing ativan to BID from TID at 0 25mg for anxiety with intention to continue taper; will continue to assess daily for further optimization  Nephrology managing BP and Na, improving Sodium 137 today  Outpatient follow up with endocrinology on DC  PT to work with patient inpatient, determining what he will need on DC  Anticipated DC next week pending improvement    Continue with pharmacotherapy, group therapy, milieu therapy and occupational therapy  Continue to assess for adverse medication side effects  Encourage Hans Christina 22 to participate in nonverbal forms of therapy including journaling and art/music therapy  Continue frequent safety checks and vitals per unit protocol  Continue to engage CM/SW to assist with collateral, disposition planning, and the implementation of an individualized, patient-centered plan of care    Continue medical management by medical team   Case discussed with treatment team     Legal Status: 201  ------------------------------------------------------------    Subjective: All documentation including nursing notes, medication history to ensure medication adherence on the unit, labs, and vitals were reviewed  Dae Curry was evaluated this morning for continuity of care and no acute distress noted throughout the evaluation  Over the past 24 hours per nursing report, Dae Curry has been talking to self in groups and continues to be preoccupied about small decisions but is cooperative on the unit and compliant with medications  Today, Dae Curry is consenting for safety on the unit  Dae Curry reports feeling "good " Dae Curry notes having improved sleep  Dae Curry states having a good appetite  Dae Curry has been taking the medications as prescribed and reporting no side effects  Patient was seen and examined today  We discussed his stay overall and patient states he thinks he is improving but remains fixated on somatic symptoms  Patient per report had BM 10/20  Patient states his main worry is his medications and continues repeating them  Provided copy of psych meds  Dae Curry denies suicidal ideations  Dae Curry denies homicidal ideations  Regarding hallucinations, Dae Curry denies does not appear to be internally stimulated    PRNs overnight:  None  VS: Reviewed, within normal limits    Progress Toward Goals: slow improvement    Psychiatric Review of Systems:  Behavior over the last 24 hours:  improved  Sleep: improved  Appetite: normal  Medication side effects: No   ROS: all other systems are negative    Vital signs in last 24 hours:  Temp:  [97 4 °F (36 3 °C)-97 7 °F (36 5 °C)] 97 4 °F (36 3 °C)  HR:  [86-93] 90  Resp:  [16-18] 18  BP: (118-149)/(71-86) 149/86    Laboratory results:  I have personally reviewed all pertinent laboratory/tests results    Recent Results (from the past 48 hour(s))   CBC and differential    Collection Time: 10/20/22  6:35 AM   Result Value Ref Range    WBC 4 79 4 31 - 10 16 Thousand/uL    RBC 4 25 3 88 - 5 62 Million/uL    Hemoglobin 12 8 12 0 - 17 0 g/dL    Hematocrit 38 6 36 5 - 49 3 %    MCV 91 82 - 98 fL    MCH 30 1 26 8 - 34 3 pg    MCHC 33 2 31 4 - 37 4 g/dL    RDW 14 0 11 6 - 15 1 %    MPV 9 8 8 9 - 12 7 fL    Platelets 521 (L) 956 - 390 Thousands/uL    nRBC 0 /100 WBCs    Neutrophils Relative 72 43 - 75 %    Immat GRANS % 0 0 - 2 %    Lymphocytes Relative 14 14 - 44 %    Monocytes Relative 12 4 - 12 %    Eosinophils Relative 2 0 - 6 %    Basophils Relative 0 0 - 1 %    Neutrophils Absolute 3 46 1 85 - 7 62 Thousands/µL    Immature Grans Absolute 0 02 0 00 - 0 20 Thousand/uL    Lymphocytes Absolute 0 65 0 60 - 4 47 Thousands/µL    Monocytes Absolute 0 57 0 17 - 1 22 Thousand/µL    Eosinophils Absolute 0 07 0 00 - 0 61 Thousand/µL    Basophils Absolute 0 02 0 00 - 0 10 Thousands/µL   Protime-INR    Collection Time: 10/20/22  6:35 AM   Result Value Ref Range    Protime 19 9 (H) 11 6 - 14 5 seconds    INR 1 65 (H) 0 84 - 1 19   Sodium    Collection Time: 10/20/22  6:35 AM   Result Value Ref Range    Sodium 136 135 - 147 mmol/L   Sodium    Collection Time: 10/21/22  4:51 AM   Result Value Ref Range    Sodium 137 135 - 147 mmol/L         Mental Status Evaluation:    Appearance:  casually dressed, marginal hygiene, overweight, Overtly appearing  male, in no apparent acute distress, context dependent eye contact   Behavior:  cooperative, less anxious   Speech:  normal rate and volume minimal stuttering   Mood:  "Good"   Affect:  constricted, Less bright today   Thought Process:  perseverative, Less negative, somewhat disorganized   Associations: perseverative   Thought Content:  preoccupied with Bodily functions, medications, small decisions   Perceptual Disturbances: Denies auditory or visual hallucinations and Does not appear to be responding to internal stimuli   Risk Potential: Suicidal ideation - None at present  Homicidal ideation - None at present  Potential for aggression - Not at present   Sensorium: oriented to person, place and time/date   Memory:  recent and remote memory grossly intact   Consciousness:  alert and awake   Attention/Concentration: attention span and concentration are age appropriate   Insight:  partial   Judgment: partial   Gait/Station: slow gait, uses walker   Motor Activity: no abnormal movements, increased tremor when anxious       Current Medications:  Current Facility-Administered Medications   Medication Dose Route Frequency Provider Last Rate   • acetaminophen  650 mg Oral Q4H PRN Lindy Oneill MD     • acetaminophen  650 mg Oral Q4H PRN Lidny Oneill MD     • acetaminophen  975 mg Oral Q6H PRN Lindy Oneill MD     • aluminum-magnesium hydroxide-simethicone  30 mL Oral Q4H PRN Lindy Oneill MD     • amLODIPine  5 mg Oral Daily Keily Espino PA-C     • ARIPiprazole  10 mg Oral Daily Mary Fernandez DO     • atorvastatin  40 mg Oral Daily With Dinner Lindy Oneill MD     • benztropine  1 mg Intramuscular Q4H PRN Max 6/day Lindy Oneill MD     • benztropine  0 5 mg Oral Q4H PRN Max 6/day Laura Gentile MD     • carvedilol  37 5 mg Oral BID With Meals Keily Espino PA-C     • docusate sodium  100 mg Oral HS DONELL Chase     • enoxaparin  1 mg/kg Subcutaneous Q12H [facility] Cesarmitonja Jose PA-C     • famotidine  40 mg Oral BID Karina Jose PA-C     • haloperidol lactate  5 mg Intramuscular Q4H PRN Max 4/day Lindy Oneill MD     • hydrOXYzine HCL  25 mg Oral Q6H PRN Max 4/day Lindy Oneill MD     • LORazepam  1 mg Intramuscular Q6H PRN Max 3/day Lindy Oneill MD     • LORazepam  0 25 mg Oral BID Santoshine Jim DO     • LORazepam  0 5 mg Oral Q6H PRN DONELL Chase     • melatonin  3 mg Oral HS Lindy Oneill MD     • mirtazapine  37 5 mg Oral HS Mary Fernandez DO     • pantoprazole  40 mg Oral BID Karina Jose PA-C     • polyethylene glycol  17 g Oral Daily PRN Lindy Oneill MD     • propranolol  5 mg Oral Q8H PRN Darlene Bailey MD     • risperiDONE  0 25 mg Oral Q4H PRN Max 6/day Laura Acevedo MD     • risperiDONE  0 5 mg Oral Q4H PRN Max 3/day Darlene Bailey MD     • risperiDONE  1 mg Oral Q2H PRN Max 3/day Darlene Bailey MD     • senna-docusate sodium  1 tablet Oral Daily PRN Darlene Bailey MD     • sodium chloride  1 g Oral BID With Meals Don Person DO     • torsemide  10 mg Oral Daily Don Person DO     • traZODone  100 mg Oral HS PRN Eve Mae MD     • warfarin  4 mg Oral Daily (warfarin) Avril Shankar PA-C         Suicide/Homicide Risk Assessment:  Risk of Harm to Self:   Current Specific Risk Factors include: current anxiety symptoms, poor self care, poor reasoning, health problems  Protective Factors: able to contract for safety on the unit, taking medications as ordered on the unit, responds to redirection  Based on today's assessment, Rudy Gilmore presents the following risk of harm to self: high       Risk of Harm to Others:  Based on today's assessment, Rudy Gilmore presents the following risk of harm to others: minimal    The following interventions are recommended: behavioral checks every 7 minutes, continued hospitalization on locked unit    Behavioral Health Medications: All current active meds have been reviewed  Changes as in plan section above  Risks, benefits and possible side effects of Medications:   Risks, benefits, and possible side effects of medications explained to patient and patient verbalizes understanding  Counseling / Coordination of Care:  Patient's progress discussed with staff in treatment team meeting  Medications, treatment progress and treatment plan reviewed with patient  Pedro De Los Santos 10/21/22  Psychiatry Resident, PGY-II    This note was completed in part utilizing [employer] Direct Software   Grammatical, translation, syntax errors, random word insertions, spelling mistakes, and incomplete sentences may be an occasional consequence of this system secondary to software limitations with voice recognition, ambient noise, and hardware issues  If you have any questions or concerns about the content, text, or information contained within the body of this dictation, please contact the provider for clarification

## 2022-10-21 NOTE — PLAN OF CARE
Problem: Ineffective Coping  Goal: Identifies ineffective coping skills  Outcome: Progressing  Goal: Identifies healthy coping skills  Outcome: Progressing  Goal: Demonstrates healthy coping skills  Outcome: Progressing  Goal: Patient/Family participate in treatment and DC plans  Description: Interventions:  - Provide therapeutic environment  Outcome: Progressing  Goal: Patient/Family verbalizes awareness of resources  Outcome: Progressing  Goal: Understands least restrictive measures  Description: Interventions:  - Utilize least restrictive behavior  Outcome: Progressing  Goal: Free from restraint events  Description: - Utilize least restrictive measures   - Provide behavioral interventions   - Redirect inappropriate behaviors   Outcome: Progressing     Problem: Anxiety  Goal: Anxiety is at manageable level  Description: Interventions:  - Assess and monitor patient's anxiety level  - Monitor for signs and symptoms (heart palpitations, chest pain, shortness of breath, headaches, nausea, feeling jumpy, restlessness, irritable, apprehensive)  - Collaborate with interdisciplinary team and initiate plan and interventions as ordered  - [place] patient to unit/surroundings  - Explain treatment plan  - Encourage participation in care  - Encourage verbalization of concerns/fears  - Identify coping mechanisms  - Assist in developing anxiety-reducing skills  - Administer/offer alternative therapies  - Limit or eliminate stimulants  Outcome: Progressing     Problem: Nutrition/Hydration-ADULT  Goal: Nutrient/Hydration intake appropriate for improving, restoring or maintaining nutritional needs  Description: Monitor and assess patient's nutrition/hydration status for malnutrition  Collaborate with interdisciplinary team and initiate plan and interventions as ordered  Monitor patient's weight and dietary intake as ordered or per policy  Utilize nutrition screening tool and intervene as necessary   Determine patient's food preferences and provide high-protein, high-caloric foods as appropriate       INTERVENTIONS:  - Monitor oral intake, urinary output, labs, and treatment plans  - Assess nutrition and hydration status and recommend course of action  - Evaluate amount of meals eaten  - Assist patient with eating if necessary   - Allow adequate time for meals  - Recommend/ encourage appropriate diets, oral nutritional supplements, and vitamin/mineral supplements  - Order, calculate, and assess calorie counts as needed  - Recommend, monitor, and adjust tube feedings and TPN/PPN based on assessed needs  - Assess need for intravenous fluids  - Provide specific nutrition/hydration education as appropriate  - Include patient/family/caregiver in decisions related to nutrition  Outcome: Progressing

## 2022-10-21 NOTE — PROGRESS NOTES
[facility]  Pt was able to stay for duration  Pt did mention he believed the medications were sedating  Encouraged pt to continue communicating needs with doctor and nursing making needs known  Pt had an awareness of effects and conveyed frustration  Pt did become increasingly anxious at the suggestion of having a journal and even writing his name in it  Left journal with nursing if patient changes his mind  Pt preoccupied with how would he hold a journal and put it  Pt tremulous at end of group  10/21/22 1100   Activity/Group Checklist   Group Other (Comment)  ([facility])   Attendance Attended   Attendance Duration (min) 31-45   Interactions Other (Comment)  (anxious)   Affect/Mood Incongruent   Goals Achieved Able to listen to others; Able to engage in interactions; Identified feelings; Discussed coping strategies; Identified relapse prevention strategies; Able to self-disclose

## 2022-10-21 NOTE — NURSING NOTE
Patient intermittently visible on the unit throughout the shift, social with staff and peers on approach  Patient brightens slightly during assessment questions, reporting 5/10 depression and 2/4 anxiety, denies SI/HI/AVH  Patient reporting "terrible news" has been delivered to him regarding his  and psychiatrist, easily reassured  Patient compliant with medications and meals, appetite fair  No further signs of distress noted  VSS

## 2022-10-21 NOTE — CASE MANAGEMENT
Cm met with pt, provided introduction and informed pt of CM coverage  Pt became upset and visibly anxious stating his family will not be able to trust that CM is and that CM will not be able to assist pt  CM provided reassurance to pt

## 2022-10-21 NOTE — NURSING NOTE
Pt was observed walking the halls in and out of the dayroom and TV room throughout the shift  Pt does take occasional breaks in room as he lays down  Patient is anxious, pessimistic and guarded at times  Pt rated anxiety 4/4 while having 5/10 depression  Pt denies any AH, VH, SI and HI  Patient denies pain at this time  No agitation or aggression noted  Patient appetite okay at dinner as he ate 50%  Pt compliant with 1500 FR  Pt compliant with medications as he tolerated them well after much encouragement  Bed alarm refused at  despite being a fall risk  Q 7 minute checks maintained

## 2022-10-21 NOTE — PROGRESS NOTES
NEPHROLOGY PROGRESS NOTE   Rhiannon Fried 58 y o  male MRN: [id number]  Unit/Bed#: [facility] 184-58 Encounter: [id number]        ASSESSMENT & PLAN    1  Hyponatremia-acute on chronic likely multifactorial with underlying SIADH  ? Sodium is now improved to 137  ? His edema is improved given his normal sodium will discontinue the salt tablet and keep him on the torsemide  ? Currently on torsemide 10 mg daily  ? Work up:  With a high urine osmolality normal urine sodium, a m  Cortisol was not low, TSH was acceptable, CT of the abdomen and pelvis along with CTA of the chest this year showed no evidence of malignancy  ? The patient is on trazodone, pantoprazole high-dose, Abilify, all of which could contribute to hyponatremia  ? Patient is now off Zoloft  ? Will relax his fluid restriction per his request                2  Hypokalemia  ? Most recent potassiums have been 3 9-4 0  ? Will do a trial without the potassium chloride although ultimately may needed now that he is on torsemide     3  Primary hypertension  ? Blood pressure has been higher  ? On amlodipine 5 mg daily, carvedilol 37 5 mg 2 times daily  ? Had a torsemide 10 mg daily and tolerating well                4  Grade 1 diastolic dysfunction with an EF of 63% with bicuspid aortic stenosis  ? Currently on beta-blocker not on ACE-inhibitor not on a loop diuretic  ? History of an aortic aneurysm with hemiarch reconstruction in 2014     5  Paroxysmal atrial fibrillation  ? Currently on Coumadin     6  Hypercoagulable state with a history of antiphospholipid syndrome     Discussion:  His sodium is now normalized  He is really complaining about the fluid restriction will relax this and see if he can maintain a normal sodium with just the torsemide  Will also discontinue the salt tablets at this point  Ultimately may need to increase  His sodium can be rechecked on Monday unless he has any altered mental status or gait disturbance, seizure    This was discussed with the nurse and the slim consultants    SUBJECTIVE:    Patient was seen today  Sitting up resting comfortably  Edema improved  No chest pain or shortness of breath    12 point review of systems was otherwise negative besides what is mentioned above      Medications:    Current Facility-Administered Medications:   •  acetaminophen (TYLENOL) tablet 650 mg, 650 mg, Oral, Q4H PRN, Tenzin Cabral MD, 650 mg at 10/03/22 2150  •  acetaminophen (TYLENOL) tablet 650 mg, 650 mg, Oral, Q4H PRN, Tenzin Cabral MD, 650 mg at 10/11/22 1537  •  acetaminophen (TYLENOL) tablet 975 mg, 975 mg, Oral, Q6H PRN, Tenzin Cabral MD  •  aluminum-magnesium hydroxide-simethicone (MYLANTA) oral suspension 30 mL, 30 mL, Oral, Q4H PRN, Tenzin Cabral MD  •  amLODIPine (NORVASC) tablet 5 mg, 5 mg, Oral, Daily, Keily Espino PA-C, 5 mg at 10/21/22 2815  •  ARIPiprazole (ABILIFY) tablet 10 mg, 10 mg, Oral, Daily, Shan Palencia DO, 10 mg at 10/21/22 0845  •  atorvastatin (LIPITOR) tablet 40 mg, 40 mg, Oral, Daily With Leatha Mccann MD, 40 mg at 10/20/22 1714  •  benztropine (COGENTIN) injection 1 mg, 1 mg, Intramuscular, Q4H PRN Max 6/day, Tenzin Cabral MD  •  benztropine (COGENTIN) tablet 0 5 mg, 0 5 mg, Oral, Q4H PRN Max 6/day, Tenzin Cabral MD  •  carvedilol (COREG) tablet 37 5 mg, 37 5 mg, Oral, BID With Meals, Keily Espino PA-C, 37 5 mg at 10/21/22 0845  •  docusate sodium (COLACE) capsule 100 mg, 100 mg, Oral, HS, Josefina Campbell, DONELL, 100 mg at 10/20/22 2118  •  enoxaparin (LOVENOX) subcutaneous injection 90 mg, 1 mg/kg, Subcutaneous, Q12H [address], Najma Sommers PA-C, 90 mg at 10/21/22 0434  •  famotidine (PEPCID) tablet 40 mg, 40 mg, Oral, BID, Yvette Heck PA-C, 40 mg at 10/21/22 0848  •  haloperidol lactate (HALDOL) injection 5 mg, 5 mg, Intramuscular, Q4H PRN Max 4/day, Tenzin Cabral MD  •  hydrOXYzine HCL (ATARAX) tablet 25 mg, 25 mg, Oral, Q6H PRN Max 4/day, Tenzin Cabral MD, 25 mg at 10/13/22 1555  •  LORazepam (ATIVAN) injection 1 mg, 1 mg, Intramuscular, Q6H PRN Max 3/day, Barry Dougherty MD  •  LORazepam (ATIVAN) tablet 0 25 mg, 0 25 mg, Oral, BID, Debra Casey DO  •  LORazepam (ATIVAN) tablet 0 5 mg, 0 5 mg, Oral, Q6H PRN, DONELL Daniel, 0 5 mg at 10/15/22 1151  •  melatonin tablet 3 mg, 3 mg, Oral, HS, Barry Dougherty MD, 3 mg at 10/20/22 2118  •  mirtazapine (REMERON) tablet 37 5 mg, 37 5 mg, Oral, HS, Debra Casey DO, 37 5 mg at 10/20/22 2118  •  pantoprazole (PROTONIX) EC tablet 40 mg, 40 mg, Oral, BID, Yvette Heck PA-C, 40 mg at 10/21/22 0848  •  polyethylene glycol (MIRALAX) packet 17 g, 17 g, Oral, Daily PRN, Barry Dougherty MD, 17 g at 10/16/22 5796  •  propranolol (INDERAL) tablet 5 mg, 5 mg, Oral, Q8H PRN, Barry Dougherty MD, 5 mg at 10/10/22 1226  •  risperiDONE (RisperDAL) tablet 0 25 mg, 0 25 mg, Oral, Q4H PRN Max 6/day, Laura Louie MD, 0 25 mg at 10/11/22 1801  •  risperiDONE (RisperDAL) tablet 0 5 mg, 0 5 mg, Oral, Q4H PRN Max 3/day, Barry Dougherty MD  •  risperiDONE (RisperDAL) tablet 1 mg, 1 mg, Oral, Q2H PRN Max 3/day, Barry Dougherty MD  •  senna-docusate sodium (SENOKOT S) 8 6-50 mg per tablet 1 tablet, 1 tablet, Oral, Daily PRN, Barry Dougherty MD, 1 tablet at 10/19/22 1257  •  torsemide (DEMADEX) tablet 10 mg, 10 mg, Oral, Daily, Stuart Taylor DO, 10 mg at 10/21/22 0847  •  traZODone (DESYREL) tablet 100 mg, 100 mg, Oral, HS PRN, Hodan Davis MD, 100 mg at 10/18/22 2112  •  warfarin (COUMADIN) tablet 4 mg, 4 mg, Oral, Daily (warfarin), Neeta Calvin PA-C, 4 mg at 10/20/22 7114    OBJECTIVE:    Vitals:    10/20/22 0745 10/20/22 1528 10/20/22 2015 10/21/22 0742   BP: 145/93 118/76 124/71 149/86   BP Location: Right arm Left arm Right arm Right arm   Pulse: 95 93 86 90   Resp: 16 16 18 18   Temp: (!) 96 6 °F (35 9 °C) 97 5 °F (36 4 °C) 97 7 °F (36 5 °C) (!) 97 4 °F (36 3 °C)   TempSrc: Temporal Temporal Temporal Temporal   SpO2: 96% 98% 98% 98%   Weight:       Height:            Temp:  [97 4 °F (36 3 °C)-97 7 °F (36 5 °C)] 97 4 °F (36 3 °C)  HR:  [86-93] 90  Resp:  [16-18] 18  BP: (118-149)/(71-86) 149/86  SpO2:  [98 %] 98 %     Body mass index is 29 23 kg/m²  Weight (last 2 days)     None          I/O last 3 completed shifts: In: 18 [P O :1480]  Out: -     I/O this shift:  In: 120 [P O :120]  Out: -       Physical exam:    General: no acute distress, cooperative  Eyes: conjunctivae pink, anicteric sclerae  ENT: lips and mucous membranes moist, no exudates, normal external ears  Neck: ROM intact, no JVD  Chest: No respiratory distress, no accessory muscle use  CVS: normal rate, non pericardial friction rub  Abdomen: soft, non-tender, non-distended, normoactive bowel sounds  Extremities:  Positive edema  Skin: no rash  Neuro: awake, alert, oriented, grossly intact  Psych:  Pleasant affect    Invasive Devices:      Lab Results:   Results from last 7 days   Lab Units 10/20/22  0635 10/17/22  0515 10/16/22  0956 10/16/22  0006 10/15/22  1619 10/15/22  1027   WBC Thousand/uL 4 79  --   --   --   --   --    HEMOGLOBIN g/dL 12 8  --   --   --   --   --    HEMATOCRIT % 38 6  --   --   --   --   --    PLATELETS Thousands/uL 148*  --   --   --   --   --    POTASSIUM mmol/L  --  3 9 4 0 3 6 4 0 3 9   CHLORIDE mmol/L  --  99 96 98 95* 93*   CO2 mmol/L  --  27 32 29 30 30   BUN mg/dL  --  12 14 16 16 14   CREATININE mg/dL  --  0 92 1 06 0 98 1 09 0 96   CALCIUM mg/dL  --  8 3* 9 1 8 1* 8 6 8 8         Portions of the record may have been created with voice recognition software  Occasional wrong word or "sound a like" substitutions may have occurred due to the inherent limitations of voice recognition software  Read the chart carefully and recognize, using context, where substitutions have occurred  If you have any questions, please contact the dictating provider

## 2022-10-21 NOTE — NURSING NOTE
Pt alert and visible on the unit  This am reported depression 3/10 and anxiety 2/4  Stated, "I feel like everything is closing in on me  My  and my doctor are changing next week  These are not good things"  Anxious, tremors noted on left  Informed fluid restriction is now 1800cc  Compliant with the same  Ambulating with assistance of walker  Will continue to monitor and maintain q 7 min checks

## 2022-10-21 NOTE — TREATMENT TEAM
10/21/22 0828   Team Meeting   Meeting Type Daily Rounds   Initial Conference Date 10/21/22   Team Members Present   Team Members Present ;Physician;Nurse;   Physician Team Member Dr Flor Gunter, Dr Gonsalo Aragon Team Member DEVIKA [facility] Management Team Member Seymour Mesa Work Team Member Indira Garcia   Patient/Family Present   Patient Present No   Patient's Family Present No     Anxiety 4/4, depression 5/10, focused on toileting, dependent on staff for toileting, observed talking to himself, appears less anxious, less stuttering, increased shaking when speaking about stressful topics, somatic, plan to decrease ativan, maximizing mirtazapine

## 2022-10-22 LAB
INR PPP: 1.96 (ref 0.84–1.19)
PROTHROMBIN TIME: 22.7 SECONDS (ref 11.6–14.5)
SODIUM SERPL-SCNC: 138 MMOL/L (ref 135–147)

## 2022-10-22 PROCEDURE — 84295 ASSAY OF SERUM SODIUM: CPT

## 2022-10-22 PROCEDURE — 85610 PROTHROMBIN TIME: CPT | Performed by: PHYSICIAN ASSISTANT

## 2022-10-22 PROCEDURE — 99232 SBSQ HOSP IP/OBS MODERATE 35: CPT | Performed by: STUDENT IN AN ORGANIZED HEALTH CARE EDUCATION/TRAINING PROGRAM

## 2022-10-22 RX ORDER — LORAZEPAM 0.5 MG/1
0.25 TABLET ORAL EVERY EVENING
Status: DISCONTINUED | OUTPATIENT
Start: 2022-10-22 | End: 2022-10-26 | Stop reason: HOSPADM

## 2022-10-22 RX ADMIN — LORAZEPAM 0.25 MG: 0.5 TABLET ORAL at 17:10

## 2022-10-22 RX ADMIN — TORSEMIDE 10 MG: 20 TABLET ORAL at 08:13

## 2022-10-22 RX ADMIN — MIRTAZAPINE 37.5 MG: 30 TABLET, FILM COATED ORAL at 21:44

## 2022-10-22 RX ADMIN — ATORVASTATIN CALCIUM 40 MG: 40 TABLET, FILM COATED ORAL at 17:10

## 2022-10-22 RX ADMIN — LORAZEPAM 0.25 MG: 0.5 TABLET ORAL at 08:15

## 2022-10-22 RX ADMIN — ENOXAPARIN SODIUM 90 MG: 100 INJECTION SUBCUTANEOUS at 08:20

## 2022-10-22 RX ADMIN — MELATONIN TAB 3 MG 3 MG: 3 TAB at 21:44

## 2022-10-22 RX ADMIN — ENOXAPARIN SODIUM 90 MG: 100 INJECTION SUBCUTANEOUS at 21:51

## 2022-10-22 RX ADMIN — FAMOTIDINE 40 MG: 20 TABLET ORAL at 17:10

## 2022-10-22 RX ADMIN — PANTOPRAZOLE SODIUM 40 MG: 40 TABLET, DELAYED RELEASE ORAL at 21:44

## 2022-10-22 RX ADMIN — AMLODIPINE BESYLATE 5 MG: 5 TABLET ORAL at 08:17

## 2022-10-22 RX ADMIN — ARIPIPRAZOLE 10 MG: 10 TABLET ORAL at 08:13

## 2022-10-22 RX ADMIN — CARVEDILOL 37.5 MG: 12.5 TABLET, FILM COATED ORAL at 17:10

## 2022-10-22 RX ADMIN — PANTOPRAZOLE SODIUM 40 MG: 40 TABLET, DELAYED RELEASE ORAL at 08:17

## 2022-10-22 RX ADMIN — FAMOTIDINE 40 MG: 20 TABLET ORAL at 08:13

## 2022-10-22 RX ADMIN — CARVEDILOL 37.5 MG: 12.5 TABLET, FILM COATED ORAL at 08:18

## 2022-10-22 RX ADMIN — WARFARIN SODIUM 4 MG: 2 TABLET ORAL at 17:09

## 2022-10-22 RX ADMIN — DOCUSATE SODIUM 100 MG: 100 CAPSULE, LIQUID FILLED ORAL at 21:44

## 2022-10-22 NOTE — PLAN OF CARE
Problem: PHYSICAL THERAPY ADULT  Goal: Performs mobility at highest level of function for planned discharge setting  See evaluation for individualized goals  Description: Treatment/Interventions: ADL retraining, Functional transfer training, LE strengthening/ROM, Elevations, Therapeutic exercise, Endurance training, Patient/family training, Equipment eval/education, Bed mobility, Gait training, Spoke to nursing, Spoke to case management, OT  Equipment Recommended: Adelaide Guillory       See flowsheet documentation for full assessment, interventions and recommendations  Outcome: Progressing  Note: Prognosis: Good  Problem List: Decreased strength, Decreased endurance, Impaired balance, Decreased mobility, Decreased coordination, Decreased cognition, Impaired sensation, Pain  Assessment: Pt continues to be somewhat self limiting with activity  Reporting increased fatigue after ambulation not agreeable to LE exercise today  Goals extended  Barriers to Discharge: Inaccessible home environment, Decreased caregiver support     PT Discharge Recommendation: Home with home health rehabilitation    See flowsheet documentation for full assessment

## 2022-10-22 NOTE — PLAN OF CARE
Problem: Anxiety  Goal: Anxiety is at manageable level  Description: Interventions:  - Assess and monitor patient's anxiety level  - Monitor for signs and symptoms (heart palpitations, chest pain, shortness of breath, headaches, nausea, feeling jumpy, restlessness, irritable, apprehensive)  - Collaborate with interdisciplinary team and initiate plan and interventions as ordered  - [place] patient to unit/surroundings  - Explain treatment plan  - Encourage participation in care  - Encourage verbalization of concerns/fears  - Identify coping mechanisms  - Assist in developing anxiety-reducing skills  - Administer/offer alternative therapies  - Limit or eliminate stimulants  Outcome: Progressing     Problem: Nutrition/Hydration-ADULT  Goal: Nutrient/Hydration intake appropriate for improving, restoring or maintaining nutritional needs  Description: Monitor and assess patient's nutrition/hydration status for malnutrition  Collaborate with interdisciplinary team and initiate plan and interventions as ordered  Monitor patient's weight and dietary intake as ordered or per policy  Utilize nutrition screening tool and intervene as necessary  Determine patient's food preferences and provide high-protein, high-caloric foods as appropriate       INTERVENTIONS:  - Monitor oral intake, urinary output, labs, and treatment plans  - Assess nutrition and hydration status and recommend course of action  - Evaluate amount of meals eaten  - Assist patient with eating if necessary   - Allow adequate time for meals  - Recommend/ encourage appropriate diets, oral nutritional supplements, and vitamin/mineral supplements  - Order, calculate, and assess calorie counts as needed  - Recommend, monitor, and adjust tube feedings and TPN/PPN based on assessed needs  - Assess need for intravenous fluids  - Provide specific nutrition/hydration education as appropriate  - Include patient/family/caregiver in decisions related to nutrition  Outcome: Progressing

## 2022-10-22 NOTE — PROGRESS NOTES
Progress Note - 2625 Neha Moses 58 y o  male MRN: [id number]  Unit/Bed#: [facility] 643-10 Encounter: [id number]    The patient was seen for continuing care and reviewed with treatment team  Patient has been visible on unit  He reports concern about resident moving on to new rotation and CM being on vacation  He worries current CM will not know how to work on his discharge planning, but is  easily reassured  No longer requesting multiple PRNS and not focusing on his stool and bowels, still easily gets worried with change but less overwhelming  He continues to ask same questions about his medications and what the plan will be   Sleep,appetite and energy are good    /90 (BP Location: Left arm)   Pulse 91   Temp 98 1 °F (36 7 °C) (Temporal)   Resp 18   Ht 5' 8" (1 727 m)   Wt 87 2 kg (192 lb 3 9 oz)   SpO2 95%   BMI 29 23 kg/m²     Current Mental Status Evaluation:  Appearance:  Fair hygiene and grooming, left residual tremors   Behavior:  calm, cooperative, friendly and psychomotor retardation   Mood:  Anxious   Affect: Anxious, slightly intensified residual tremors on less hand   Speech: Soft and Normal rate   Thought Process:  Goal directed and coherent   Thought Content:  Does not verbalize delusional material, less negative ruminations, worries about discharge planning , change in CM    Perceptual Disturbances: Denies hallucinations and does not appear to be responding to internal stimuli   Risk Potential: No suicidal or homicidal ideation   Orientation:   Patient is alert,awake and oriented x 3   Patient has limited insight, judgment and impulse control    Recent Results (from the past 72 hour(s))   CBC and differential    Collection Time: 10/20/22  6:35 AM   Result Value Ref Range    WBC 4 79 4 31 - 10 16 Thousand/uL    RBC 4 25 3 88 - 5 62 Million/uL    Hemoglobin 12 8 12 0 - 17 0 g/dL    Hematocrit 38 6 36 5 - 49 3 %    MCV 91 82 - 98 fL    MCH 30 1 26 8 - 34 3 pg    MCHC 33 2 31 4 - 37 4 g/dL    RDW 14 0 11 6 - 15 1 %    MPV 9 8 8 9 - 12 7 fL    Platelets 553 (L) 105 - 390 Thousands/uL    nRBC 0 /100 WBCs    Neutrophils Relative 72 43 - 75 %    Immat GRANS % 0 0 - 2 %    Lymphocytes Relative 14 14 - 44 %    Monocytes Relative 12 4 - 12 %    Eosinophils Relative 2 0 - 6 %    Basophils Relative 0 0 - 1 %    Neutrophils Absolute 3 46 1 85 - 7 62 Thousands/µL    Immature Grans Absolute 0 02 0 00 - 0 20 Thousand/uL    Lymphocytes Absolute 0 65 0 60 - 4 47 Thousands/µL    Monocytes Absolute 0 57 0 17 - 1 22 Thousand/µL    Eosinophils Absolute 0 07 0 00 - 0 61 Thousand/µL    Basophils Absolute 0 02 0 00 - 0 10 Thousands/µL   Protime-INR    Collection Time: 10/20/22  6:35 AM   Result Value Ref Range    Protime 19 9 (H) 11 6 - 14 5 seconds    INR 1 65 (H) 0 84 - 1 19   Sodium    Collection Time: 10/20/22  6:35 AM   Result Value Ref Range    Sodium 136 135 - 147 mmol/L   Sodium    Collection Time: 10/21/22  4:51 AM   Result Value Ref Range    Sodium 137 135 - 147 mmol/L   Protime-INR    Collection Time: 10/21/22  1:52 PM   Result Value Ref Range    Protime 20 0 (H) 11 6 - 14 5 seconds    INR 1 66 (H) 0 84 - 1 19   Protime-INR    Collection Time: 10/22/22 10:42 AM   Result Value Ref Range    Protime 22 7 (H) 11 6 - 14 5 seconds    INR 1 96 (H) 0 84 - 1 19   Sodium    Collection Time: 10/22/22 10:42 AM   Result Value Ref Range    Sodium 138 135 - 147 mmol/L       Progress Toward Goals: No significant events in the past 24 hours, Na is Normal 138, Following fluid restriction at 1800cc  INR remains subtherapeutic- pt is on Lovenox now and warfarin  SLIM is on board     Reduce ativan to 0 5mg PM     Principal Problem:    SHIMON (generalized anxiety disorder)  Active Problems:    Hypertension    GERD (gastroesophageal reflux disease)    Atrial fibrillation (HCC)    Class 2 obesity in adult    History of stroke    Antiphospholipid antibody with hypercoagulable state ([facility] [address] )    Hyperbilirubinemia    CORIE (obstructive sleep apnea)    Occasional tremors    PVD (peripheral vascular disease) (HCC)    Medical clearance for incarceration    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 attack    Mild protein-calorie malnutrition (HCC)    Sinus arrhythmia    Supratherapeutic INR    Hyponatremia    SIADH (syndrome of inappropriate ADH production) ([facility] [address] )    Subclinical hyperthyroidism    Discharge planning update: The patient will return to previous living arrangement    Recommended Treatment: Continue with pharmacotherapy, group therapy, milieu therapy and occupational therapy    The patient will be maintained on the following medications:  Current Facility-Administered Medications   Medication Dose Route Frequency Provider Last Rate   • acetaminophen  650 mg Oral Q4H PRN Polo Jackson MD     • acetaminophen  650 mg Oral Q4H PRN Polo Jackson MD     • acetaminophen  975 mg Oral Q6H PRN Polo Jackson MD     • aluminum-magnesium hydroxide-simethicone  30 mL Oral Q4H PRN Polo Jackson MD     • amLODIPine  5 mg Oral Daily Keily Espino PA-C     • ARIPiprazole  10 mg Oral Daily Lona Buerger, DO     • atorvastatin  40 mg Oral Daily With Dinner Polo Jackson MD     • benztropine  1 mg Intramuscular Q4H PRN Max 6/day Polo Jackson MD     • benztropine  0 5 mg Oral Q4H PRN Max 6/day Laura Johnson MD     • carvedilol  37 5 mg Oral BID With Meals Keily Espino PA-C     • docusate sodium  100 mg Oral HS DONELL [place]     • enoxaparin  1 mg/kg Subcutaneous Q12H [address] Peter Sanchez PA-C     • famotidine  40 mg Oral BID Peter Sanchez PA-C     • haloperidol lactate  5 mg Intramuscular Q4H PRN Max 4/day Polo Jackson MD     • hydrOXYzine HCL  25 mg Oral Q6H PRN Max 4/day Polo Jackson MD     • LORazepam  1 mg Intramuscular Q6H PRN Max 3/day Polo Jackson MD     • LORazepam  0 25 mg Oral BID Lona Buerger, DO     • LORazepam  0 5 mg Oral Q6H PRN DONELL [place] • melatonin  3 mg Oral HS Hoda Gutierrez MD     • mirtazapine  37 5 mg Oral HS Janay Jarrell, DO     • pantoprazole  40 mg Oral BID Yasmeen Tse PA-C     • polyethylene glycol  17 g Oral Daily PRN Hoda Gutierrez MD     • propranolol  5 mg Oral Q8H PRN Hoda Gutierrez MD     • risperiDONE  0 25 mg Oral Q4H PRN Max 6/day Hoda Gutierrez MD     • risperiDONE  0 5 mg Oral Q4H PRN Max 3/day Hoda Gutierrez MD     • risperiDONE  1 mg Oral Q2H PRN Max 3/day Hoda Gutierrez MD     • senna-docusate sodium  1 tablet Oral Daily PRN Hoda Gutierrez MD     • torsemide  10 mg Oral Daily Latricia De La O DO     • traZODone  100 mg Oral HS PRN Lauro Gutierrez MD     • warfarin  4 mg Oral Daily (warfarin) Yasmeen Tse PA-C

## 2022-10-22 NOTE — PHYSICAL THERAPY NOTE
Physical TherapyTreatment Note    Patient's Name: Zoya Carter    Admitting Diagnosis  Anxiety [F41 9]  Paroxysmal atrial fibrillation ([facility] ) [I48 0]  Depression [F32  A]  Anticoagulated on Coumadin [Z79 01]  Encounter for psychological evaluation [Z00 8]    Problem List  Patient Active Problem List   Diagnosis    Numbness    H/O aortic aneurysm repair    Hypertension    GERD (gastroesophageal reflux disease)    SHIMON (generalized anxiety disorder)    Atrial fibrillation (HCC)    Bright red blood per rectum    Constipation    Irritable bowel syndrome    Hyperlipidemia    Kidney stones    Peyronie disease    Vitamin D deficiency    Class 2 obesity in adult    Other viral warts    Physical deconditioning    History of stroke    Antiphospholipid antibody with hypercoagulable state ([facility] )    Hyperbilirubinemia    CORIE (obstructive sleep apnea)    Anticoagulated on Coumadin    Occasional tremors    Weakness of both lower extremities    Gastric polyps    Gastric AVM    Edema of both lower legs    Non-recurrent bilateral inguinal hernia without obstruction or gangrene    S/P laparoscopic hernia repair    Low back pain    Alkaline phosphatase elevation    Right inguinal pain    Ataxia    CVA (cerebral vascular accident) ([facility] )    PVD (peripheral vascular disease) ([facility] )    Renal cyst    Atypical chest pain    Hypokalemia    Dizziness    Medical clearance for incarceration    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 attack    Mild protein-calorie malnutrition ([facility] )    Sinus arrhythmia    Supratherapeutic INR    Hyponatremia    SIADH (syndrome of inappropriate ADH production) ([facility] )    Subclinical hyperthyroidism       Past Medical History  Past Medical History:   Diagnosis Date    Aneurysm of thoracic aorta ([facility] )     last assessed 11/20/17    Anxiety     currently on no meds    Arthritis     Bilateral inguinal hernia     Cardiac disease     Cognitive impairment     CVA (cerebral vascular accident) ([facility] ) Depression     GERD (gastroesophageal reflux disease)     Hearing loss of aging     Heart disease     Hyperlipidemia     Hypertension     Irritable bowel syndrome     Kidney stone     Obesity     Occasional tremors     left arm since stroke    Palpitations     Panic attack     PONV (postoperative nausea and vomiting)     and headache x 3 days    Psychiatric illness     Sciatica     right and left  side    Shortness of breath     on exertion    Sleep apnea     is not using a CPAP    Sleep difficulties     Spitting up blood 05/21/2021    Resolved    Status post placement of implantable loop recorder     Stroke ([facility] ) 11/2014    Stroke ([facility] ) 03/2021    TIA (transient ischemic attack)        Past Surgical History  Past Surgical History:   Procedure Laterality Date    AORTA SURGERY      thoracic - aneurysmorrhaphy    APPENDECTOMY      ASCENDING AORTIC ANEURYSM REPAIR      resolved 8/19/15    CARDIAC LOOP RECORDER      CHOLECYSTECTOMY      laparoscopic    COLONOSCOPY      CYSTOSCOPY      with removal of object- stent removal    KNEE ARTHROSCOPY Right 1996    with medial meniscus repair    LITHOTRIPSY      renal    ORTHOPEDIC SURGERY      IN FRAGMENT KIDNEY STONE/ ESWL Left 5/17/2018    Procedure: LITHROTRIPSY EXTRACORPORAL SHOCKWAVE (ESWL); Surgeon: Vernal Castleman, MD;  [facility]: [facility];  Service: Urology    IN [address] Bilateral 12/9/2021    Procedure: ROBOTIC REPAIR HERNIA INGUINAL;  Surgeon: Jayla Wylie MD;  Location: [facility];  Service: General    THUMB ARTHROSCOPY Right 1976    ligament repair    UPPER GASTROINTESTINAL ENDOSCOPY         Recent Imaging  US right upper quadrant   Final Result by Narendra Hernandez MD (10/12 0502)      No acute process identified  Workstation performed: [id number]         US thyroid   Final Result by Josefina Singh MD (10/10 )      Normal examination  Reference: ACR Thyroid Imaging, Reporting and Data System (TI-RADS):  White Paper of the ACR TI-RADS Committee  J AM Nile Radiol 1243;49:[id number]  (additional recommendations based on American Thyroid Association 2015 guidelines )         Workstation performed: [id number]             Recent Vital Signs  Vitals:    10/21/22 0742 10/21/22 1606 10/21/22 2024 10/22/22 0751   BP: 149/86 133/80 90/53 156/90   BP Location: Right arm Right arm Left arm Left arm   Pulse: 90 94 92 91   Resp: 18 16 16 18   Temp: (!) 97 4 °F (36 3 °C) 97 5 °F (36 4 °C) 97 7 °F (36 5 °C) 98 1 °F (36 7 °C)   TempSrc: Temporal Temporal Temporal Temporal   SpO2: 98% 96% 97% 95%   Weight:       Height:              10/21/22 1200   PT Last Visit   PT Visit Date 10/21/22   Note Type   Note Type Treatment   Transfers   Sit to Stand 5  Supervision   Additional items Increased time required   Stand to Sit 5  Supervision   Additional items Increased time required   Ambulation/Elevation   Gait pattern Step through pattern;Decreased toe off;Decreased heel strike; Shuffling;Decreased foot clearance   Gait Assistance 5  Supervision   Additional items Verbal cues   Assistive Device Rolling walker   Distance 60ft   Balance   Static Sitting Fair +   Dynamic Sitting Fair +   Static Standing Fair   Dynamic Standing Fair   Ambulatory Fair   Endurance Deficit   Endurance Deficit Yes   Endurance Deficit Description fatigue   Activity Tolerance   Activity Tolerance Patient limited by fatigue   Medical Staff Made Aware spoke to CM   Nurse Made Aware spoke to RN   Assessment   Prognosis Good   Problem List Decreased strength;Decreased endurance; Impaired balance;Decreased mobility; Decreased coordination;Decreased cognition; Impaired sensation;Pain   Assessment Pt continues to be somewhat self limiting with activity  Reporting increased fatigue after ambulation not agreeable to LE exercise today   Goals extended   Barriers to Discharge Inaccessible home environment;Decreased caregiver support   Goals   Patient Goals get stronger   STG Expiration Date 11/01/22   Short Term Goal #1 see eval note   PT Treatment Day 3   Plan   Treatment/Interventions ADL retraining;Functional transfer training;LE strengthening/ROM; Elevations; Therapeutic exercise; Endurance training;Patient/family training;Equipment eval/education; Bed mobility;Gait training;Spoke to nursing;Spoke to case management;OT   Progress Progressing toward goals   PT Frequency 2-3x/wk   Recommendation   PT Discharge Recommendation Home with home health rehabilitation   Brayden johnson   [address]   Turning in Bed Without Bedrails 3   Lying on Back to Sitting on Edge of Flat Bed 3   Moving Bed to Chair 3   Standing Up From Chair 3   Walk in Room 3   Climb 3-5 Stairs 3   Basic Mobility Inpatient Raw Score 18   Basic Mobility Standardized Score 41 05   Highest Level Of Mobility   JH-HLM Goal 6: Walk 10 steps or more   JH-HLM Achieved 7: Walk 25 feet or more   Education   Education Provided Mobility training;Home exercise program;Assistive device   Patient Explanation/teachback used;Demonstrates verbal understanding   End of Consult   Patient Position at End of Consult   (dining room)       Leyda Vann PT, DPT

## 2022-10-22 NOTE — NURSING NOTE
Pt visible on unit, cooperative with care  Pt appears slightly anxious and preoccupied at times, easily reassured  Medication compliant  Pt showered, visible in dayroom watching tv  Pt appears calm during shift  No symptoms reported during shift

## 2022-10-23 LAB
INR PPP: 2.32 (ref 0.84–1.19)
PROTHROMBIN TIME: 25.9 SECONDS (ref 11.6–14.5)
SODIUM SERPL-SCNC: 138 MMOL/L (ref 135–147)

## 2022-10-23 PROCEDURE — 99232 SBSQ HOSP IP/OBS MODERATE 35: CPT | Performed by: STUDENT IN AN ORGANIZED HEALTH CARE EDUCATION/TRAINING PROGRAM

## 2022-10-23 PROCEDURE — 85610 PROTHROMBIN TIME: CPT | Performed by: PHYSICIAN ASSISTANT

## 2022-10-23 PROCEDURE — 84295 ASSAY OF SERUM SODIUM: CPT

## 2022-10-23 RX ADMIN — PANTOPRAZOLE SODIUM 40 MG: 40 TABLET, DELAYED RELEASE ORAL at 21:52

## 2022-10-23 RX ADMIN — ATORVASTATIN CALCIUM 40 MG: 40 TABLET, FILM COATED ORAL at 17:13

## 2022-10-23 RX ADMIN — WARFARIN SODIUM 4 MG: 2 TABLET ORAL at 17:12

## 2022-10-23 RX ADMIN — MIRTAZAPINE 37.5 MG: 30 TABLET, FILM COATED ORAL at 21:51

## 2022-10-23 RX ADMIN — FAMOTIDINE 40 MG: 20 TABLET ORAL at 17:12

## 2022-10-23 RX ADMIN — DOCUSATE SODIUM 100 MG: 100 CAPSULE, LIQUID FILLED ORAL at 21:52

## 2022-10-23 RX ADMIN — PANTOPRAZOLE SODIUM 40 MG: 40 TABLET, DELAYED RELEASE ORAL at 08:27

## 2022-10-23 RX ADMIN — AMLODIPINE BESYLATE 5 MG: 5 TABLET ORAL at 08:28

## 2022-10-23 RX ADMIN — TORSEMIDE 10 MG: 20 TABLET ORAL at 08:26

## 2022-10-23 RX ADMIN — FAMOTIDINE 40 MG: 20 TABLET ORAL at 08:26

## 2022-10-23 RX ADMIN — LORAZEPAM 0.5 MG: 0.5 TABLET ORAL at 13:35

## 2022-10-23 RX ADMIN — ARIPIPRAZOLE 10 MG: 10 TABLET ORAL at 08:26

## 2022-10-23 RX ADMIN — CARVEDILOL 37.5 MG: 12.5 TABLET, FILM COATED ORAL at 08:27

## 2022-10-23 RX ADMIN — MELATONIN TAB 3 MG 3 MG: 3 TAB at 21:52

## 2022-10-23 NOTE — PLAN OF CARE
Problem: Ineffective Coping  Goal: Identifies ineffective coping skills  Outcome: Progressing     Problem: Ineffective Coping  Goal: Identifies healthy coping skills  Outcome: Progressing     Problem: Ineffective Coping  Goal: Demonstrates healthy coping skills  Outcome: Progressing     Problem: Anxiety  Goal: Anxiety is at manageable level  Description: Interventions:  - Assess and monitor patient's anxiety level  - Monitor for signs and symptoms (heart palpitations, chest pain, shortness of breath, headaches, nausea, feeling jumpy, restlessness, irritable, apprehensive)  - Collaborate with interdisciplinary team and initiate plan and interventions as ordered    - [place] patient to unit/surroundings  - Explain treatment plan  - Encourage participation in care  - Encourage verbalization of concerns/fears  - Identify coping mechanisms  - Assist in developing anxiety-reducing skills  - Administer/offer alternative therapies  - Limit or eliminate stimulants  Outcome: Progressing

## 2022-10-23 NOTE — NURSING NOTE
Pt visible on unit, good intake for meals  Pt visible in dayroom watching tv  Pt reported moderate anxiety and requested prn, pt appeared anxious with increased tremor of left arm  Pt accepted ativan prn, effective for symptoms  Pt watching tv calmly with no distress noted  No additional symptoms reported currently  Pt declined scheduled dose of ativan at dinner, stating he was drowsy and was afraid he would fall asleep  Pt appears calm with no distress currently

## 2022-10-23 NOTE — NURSING NOTE
Patient visible in milieu interacting with peers  Rates anxiety 2/4 and depression 4/10 and denies SI/HI/AVH  Compliant with all his medications  No other issues observed this shift   Q 7 min safety checks ongoing

## 2022-10-23 NOTE — PROGRESS NOTES
Progress Note - 26[address] Neha Moses 58 y o  male MRN: [id number]  Unit/Bed#: [facility] 098-57 Encounter: [id number]    The patient was seen for continuing care and reviewed with treatment team    Patient visibly calmer, less anxious but continues to worry and questioning his medications and treatment team  Sarcastic today  He felt overwhelmed because his bed was lowered and not made the way he wanted it   He has been visible on the unit, sleeping better,  no longer focusing on his bowels but remains somatically preoccupied at times    /84 (BP Location: Right arm)   Pulse 65   Temp 97 7 °F (36 5 °C) (Temporal)   Resp 16   Ht 5' 8" (1 727 m)   Wt 87 2 kg (192 lb 3 9 oz)   SpO2 94%   BMI 29 23 kg/m²   Current Mental Status Evaluation:  Appearance:  Fair, uses a walker, residual hand termors   Behavior:  calm, cooperative and psychomotor retardation, sarcastic   Mood:  Anxious   Affect: Constricted at times   Speech: Soft and Slow   Thought Process:  Goal directed and coherent   Thought Content:  Does not verbalize delusional material and is somatically preoccupied   Perceptual Disturbances: Denies hallucinations and does not appear to be responding to internal stimuli   Risk Potential: No suicidal or homicidal ideation   Orientation:   Patient is alert, awake and oriented x 3   Patient has Limited inisght, Judgment and Imulse control but improved    Recent Results (from the past 72 hour(s))   Sodium    Collection Time: 10/21/22  4:51 AM   Result Value Ref Range    Sodium 137 135 - 147 mmol/L   Protime-INR    Collection Time: 10/21/22  1:52 PM   Result Value Ref Range    Protime 20 0 (H) 11 6 - 14 5 seconds    INR 1 66 (H) 0 84 - 1 19   Protime-INR    Collection Time: 10/22/22 10:42 AM   Result Value Ref Range    Protime 22 7 (H) 11 6 - 14 5 seconds    INR 1 96 (H) 0 84 - 1 19   Sodium    Collection Time: 10/22/22 10:42 AM   Result Value Ref Range    Sodium 138 135 - 147 mmol/L   Protime-INR Collection Time: 10/23/22  6:33 AM   Result Value Ref Range    Protime 25 9 (H) 11 6 - 14 5 seconds    INR 2 32 (H) 0 84 - 1 19   Sodium    Collection Time: 10/23/22  6:44 AM   Result Value Ref Range    Sodium 138 135 - 147 mmol/L     Progress Toward Goals: No significant events in the past 24 hours, Slowly improving  Principal Problem:    SHIMON (generalized anxiety disorder)  Active Problems:    Hypertension    GERD (gastroesophageal reflux disease)    Atrial fibrillation ([facility])    Class 2 obesity in adult    History of stroke    Antiphospholipid antibody with hypercoagulable state ([facility] )    Hyperbilirubinemia    CORIE (obstructive sleep apnea)    Occasional tremors    PVD (peripheral vascular disease) ([facility])    Medical clearance for incarceration    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 attack    Mild protein-calorie malnutrition ([facility])    Sinus arrhythmia    Supratherapeutic INR    Hyponatremia    SIADH (syndrome of inappropriate ADH production) ([facility] )    Subclinical hyperthyroidism    Discharge planning update: The patient will return to previous living arrangement  Tentative discharge this upcoming week    Recommended Treatment: Continue with pharmacotherapy, group therapy, milieu therapy and occupational therapy    The patient will be maintained on the following medications:  Current Facility-Administered Medications   Medication Dose Route Frequency Provider Last Rate   • acetaminophen  650 mg Oral Q4H PRN Calos Lima MD     • acetaminophen  650 mg Oral Q4H PRN Calos Lima MD     • acetaminophen  975 mg Oral Q6H PRN Calos Lima MD     • aluminum-magnesium hydroxide-simethicone  30 mL Oral Q4H PRN Calos Lima MD     • amLODIPine  5 mg Oral Daily Keily Espino PA-C     • ARIPiprazole  10 mg Oral Daily Fadi Martell DO     • atorvastatin  40 mg Oral Daily With Chin Stevens MD     • benztropine  1 mg Intramuscular Q4H PRN Max 6/day Calos Lima MD     • benztropine  0 5 mg Oral Q4H PRN Max 6/day Lindaanant Hood MD     • carvedilol  37 5 mg Oral BID With Meals Keily Espino PA-C     • docusate sodium  100 mg Oral HS DONELL Lyman     • famotidine  40 mg Oral BID Kerline Gil PA-C     • haloperidol lactate  5 mg Intramuscular Q4H PRN Max 4/day Linda MD Erwin     • hydrOXYzine HCL  25 mg Oral Q6H PRN Max 4/day Linda MD Erwin     • LORazepam  1 mg Intramuscular Q6H PRN Max 3/day Linda MD Erwin     • LORazepam  0 25 mg Oral QPM Stewart Fernandes MD     • LORazepam  0 5 mg Oral Q6H PRN DONELL Lyman     • melatonin  3 mg Oral HS Linda Hood MD     • mirtazapine  37 5 mg Oral HS Usman Fernando DO     • pantoprazole  40 mg Oral BID Kerline Gil PA-C     • polyethylene glycol  17 g Oral Daily PRN Linda Hood MD     • propranolol  5 mg Oral Q8H PRN Linda Hood MD     • risperiDONE  0 25 mg Oral Q4H PRN Max 6/day Linda MD Erwin     • risperiDONE  0 5 mg Oral Q4H PRN Max 3/day Linda MD Erwin     • risperiDONE  1 mg Oral Q2H PRN Max 3/day Linda MD Erwin     • senna-docusate sodium  1 tablet Oral Daily PRN Linda Hood MD     • torsemide  10 mg Oral Daily Senait Kenny DO     • traZODone  100 mg Oral HS PRN Stewart Fernandes MD     • warfarin  4 mg Oral Daily (warfarin) Kerline Gil PA-C

## 2022-10-23 NOTE — PLAN OF CARE
Problem: Anxiety  Goal: Anxiety is at manageable level  Description: Interventions:  - Assess and monitor patient's anxiety level  - Monitor for signs and symptoms (heart palpitations, chest pain, shortness of breath, headaches, nausea, feeling jumpy, restlessness, irritable, apprehensive)  - Collaborate with interdisciplinary team and initiate plan and interventions as ordered    - [place] patient to unit/surroundings  - Explain treatment plan  - Encourage participation in care  - Encourage verbalization of concerns/fears  - Identify coping mechanisms  - Assist in developing anxiety-reducing skills  - Administer/offer alternative therapies  - Limit or eliminate stimulants  Outcome: Progressing

## 2022-10-23 NOTE — QUICK NOTE
INR therapeutic today at 2 32 (goal 2-3)  Will discontinue Lovenox  Continue Warfarin 4mg daily  Recheck INR 10/26/22  If therapeutic, can check INR weekly

## 2022-10-24 ENCOUNTER — TELEPHONE (OUTPATIENT)
Dept: PSYCHIATRY | Facility: CLINIC | Age: 62
End: 2022-10-24

## 2022-10-24 LAB — SODIUM SERPL-SCNC: 137 MMOL/L (ref 135–147)

## 2022-10-24 PROCEDURE — 84295 ASSAY OF SERUM SODIUM: CPT

## 2022-10-24 PROCEDURE — 99232 SBSQ HOSP IP/OBS MODERATE 35: CPT | Performed by: STUDENT IN AN ORGANIZED HEALTH CARE EDUCATION/TRAINING PROGRAM

## 2022-10-24 RX ORDER — ARIPIPRAZOLE 5 MG/1
5 TABLET ORAL DAILY
Status: DISCONTINUED | OUTPATIENT
Start: 2022-10-25 | End: 2022-10-26 | Stop reason: HOSPADM

## 2022-10-24 RX ADMIN — CARVEDILOL 37.5 MG: 12.5 TABLET, FILM COATED ORAL at 08:43

## 2022-10-24 RX ADMIN — FAMOTIDINE 40 MG: 20 TABLET ORAL at 08:45

## 2022-10-24 RX ADMIN — DOCUSATE SODIUM 100 MG: 100 CAPSULE, LIQUID FILLED ORAL at 21:21

## 2022-10-24 RX ADMIN — AMLODIPINE BESYLATE 5 MG: 5 TABLET ORAL at 08:44

## 2022-10-24 RX ADMIN — LORAZEPAM 0.25 MG: 0.5 TABLET ORAL at 17:06

## 2022-10-24 RX ADMIN — FAMOTIDINE 40 MG: 20 TABLET ORAL at 17:05

## 2022-10-24 RX ADMIN — ACETAMINOPHEN 650 MG: 325 TABLET ORAL at 18:40

## 2022-10-24 RX ADMIN — MELATONIN TAB 3 MG 3 MG: 3 TAB at 21:22

## 2022-10-24 RX ADMIN — WARFARIN SODIUM 4 MG: 2 TABLET ORAL at 17:04

## 2022-10-24 RX ADMIN — TORSEMIDE 10 MG: 20 TABLET ORAL at 08:43

## 2022-10-24 RX ADMIN — PANTOPRAZOLE SODIUM 40 MG: 40 TABLET, DELAYED RELEASE ORAL at 21:22

## 2022-10-24 RX ADMIN — ARIPIPRAZOLE 10 MG: 10 TABLET ORAL at 08:44

## 2022-10-24 RX ADMIN — PANTOPRAZOLE SODIUM 40 MG: 40 TABLET, DELAYED RELEASE ORAL at 08:44

## 2022-10-24 RX ADMIN — MIRTAZAPINE 45 MG: 30 TABLET, FILM COATED ORAL at 21:21

## 2022-10-24 RX ADMIN — ATORVASTATIN CALCIUM 40 MG: 40 TABLET, FILM COATED ORAL at 17:05

## 2022-10-24 NOTE — CASE MANAGEMENT
In Basket message sent to edo requesting follow up appt with Dr Juancho Cabrera and appt to be on a Monday at family's request

## 2022-10-24 NOTE — PLAN OF CARE
Problem: Anxiety  Goal: Anxiety is at manageable level  Description: Interventions:  - Assess and monitor patient's anxiety level  - Monitor for signs and symptoms (heart palpitations, chest pain, shortness of breath, headaches, nausea, feeling jumpy, restlessness, irritable, apprehensive)  - Collaborate with interdisciplinary team and initiate plan and interventions as ordered  - [place] patient to unit/surroundings  - Explain treatment plan  - Encourage participation in care  - Encourage verbalization of concerns/fears  - Identify coping mechanisms  - Assist in developing anxiety-reducing skills  - Administer/offer alternative therapies  - Limit or eliminate stimulants  Outcome: Progressing     Problem: Nutrition/Hydration-ADULT  Goal: Nutrient/Hydration intake appropriate for improving, restoring or maintaining nutritional needs  Description: Monitor and assess patient's nutrition/hydration status for malnutrition  Collaborate with interdisciplinary team and initiate plan and interventions as ordered  Monitor patient's weight and dietary intake as ordered or per policy  Utilize nutrition screening tool and intervene as necessary  Determine patient's food preferences and provide high-protein, high-caloric foods as appropriate       INTERVENTIONS:  - Monitor oral intake, urinary output, labs, and treatment plans  - Assess nutrition and hydration status and recommend course of action  - Evaluate amount of meals eaten  - Assist patient with eating if necessary   - Allow adequate time for meals  - Recommend/ encourage appropriate diets, oral nutritional supplements, and vitamin/mineral supplements  - Order, calculate, and assess calorie counts as needed  - Recommend, monitor, and adjust tube feedings and TPN/PPN based on assessed needs  - Assess need for intravenous fluids  - Provide specific nutrition/hydration education as appropriate  - Include patient/family/caregiver in decisions related to nutrition  Outcome: Progressing

## 2022-10-24 NOTE — TELEPHONE ENCOUNTER
Patient is scheduled for DC later this week, Patient has a f/u schedule with Dr Palacios 10/31 at 815

## 2022-10-24 NOTE — TREATMENT TEAM
10/24/22 0828   Team Meeting   Meeting Type Daily Rounds   Initial Conference Date 10/24/22   Team Members Present   Team Members Present ;Physician;Nurse;;Occupational Therapist   Physician Team Member Dr Parviz Pérez Team Member DEVIKA [facility] Management Team Member Seymour Mesa Work Team Member Dori   OT Team Member Gracie GUTIERREZ   Patient/Family Present   Patient Present No   Patient's Family Present No     PRN ativan for moderate anxiety, good effect, declined scheduled dose of ativan, appeared anxious in evening, refused PRN, visible, denies depression, yelling for MHT assistance to shower

## 2022-10-24 NOTE — PROGRESS NOTES
Progress Note - 26[address] Neha Moses 58 y o  male MRN: [id number]  Unit/Bed#: Sergey Vincent 764-16 Encounter: [id number]    The patient was seen for continuing care and reviewed with treatment team  Patient seen today , walking in very slow steps in the hallway with his walker  He blames Abilify for making him sleepy and slow, also believes legs are slow because he has been overusing them    However, he was observed walking faster when prompted  Patient remains preoccupied with medications and daily routine , repetitively asking " what do I need to do now?   During the day he is observed, siting in milieu watching TV, no longer focussed on his bowels and urination but continues to have trouble making decisions, has overwhelming thoughts but it is easier to redirect him  No EPS noted    BP 97/59 (BP Location: Right arm)   Pulse 102   Temp 97 5 °F (36 4 °C) (Temporal)   Resp 18   Ht 5' 8" (1 727 m)   Wt 87 2 kg (192 lb 3 9 oz)   SpO2 98%   BMI 29 23 kg/m²     Current Mental Status Evaluation:  Appearance:  Poor eye contact, left residual hand tremor, uses a walker   Behavior:  calm, cooperative and psychomotor retardation   Mood:  Anxious   Affect: constricted and calm   Speech: Normal volume and Normal rate   Thought Process:   negative thinking, worries about making bad decision   Thought Content:  Does not verbalize delusional material   Perceptual Disturbances: Denies hallucinations and does not appear to be responding to internal stimuli   Risk Potential: No suicidal or homicidal ideation   Orientation:   Patient is alert, awake and oriented x 3   Patient has limited but improved insight , judgement and impulse control         Recent Results (from the past 72 hour(s))   Protime-INR    Collection Time: 10/22/22 10:42 AM   Result Value Ref Range    Protime 22 7 (H) 11 6 - 14 5 seconds    INR 1 96 (H) 0 84 - 1 19   Sodium    Collection Time: 10/22/22 10:42 AM   Result Value Ref Range    Sodium 138 135 - 147 mmol/L   Protime-INR    Collection Time: 10/23/22  6:33 AM   Result Value Ref Range    Protime 25 9 (H) 11 6 - 14 5 seconds    INR 2 32 (H) 0 84 - 1 19   Sodium    Collection Time: 10/23/22  6:44 AM   Result Value Ref Range    Sodium 138 135 - 147 mmol/L   Sodium    Collection Time: 10/24/22  6:16 AM   Result Value Ref Range    Sodium 137 135 - 147 mmol/L     Progress Toward Goals: No significant events in the past 24 hours, Slowly improving    increase mirtazapine to 45mg HS, reduce Abilify from 10 to 5mg daily  Continue ongoing discharge planning  Principal Problem:    SHIMON (generalized anxiety disorder)  Active Problems:    Hypertension    GERD (gastroesophageal reflux disease)    Atrial fibrillation ([facility])    Class 2 obesity in adult    History of stroke    Antiphospholipid antibody with hypercoagulable state ([facility] )    Hyperbilirubinemia    CORIE (obstructive sleep apnea)    Occasional tremors    PVD (peripheral vascular disease) ([facility])    Medical clearance for incarceration    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 attack    Mild protein-calorie malnutrition ([facility])    Sinus arrhythmia    Supratherapeutic INR    Hyponatremia    SIADH (syndrome of inappropriate ADH production) ([facility] )    Subclinical hyperthyroidism    Discharge planning update: The patient will return to previous living arrangement    Recommended Treatment: Continue with pharmacotherapy, group therapy, milieu therapy and occupational therapy    The patient will be maintained on the following medications:  Current Facility-Administered Medications   Medication Dose Route Frequency Provider Last Rate   • acetaminophen  650 mg Oral Q4H PRN Radhika Posada MD     • acetaminophen  650 mg Oral Q4H PRN Radhika Posada MD     • acetaminophen  975 mg Oral Q6H PRN Radhika Posada MD     • aluminum-magnesium hydroxide-simethicone  30 mL Oral Q4H PRN Radhika Posada MD     • amLODIPine  5 mg Oral Daily Miriam Erickson PA-C • ARIPiprazole  10 mg Oral Daily Melita Muniz DO     • atorvastatin  40 mg Oral Daily With Dinner Chelle Andersen MD     • benztropine  1 mg Intramuscular Q4H PRN Max 6/day Chelle Andersen MD     • benztropine  0 5 mg Oral Q4H PRN Max 6/day Laura Mullins MD     • carvedilol  37 5 mg Oral BID With Meals Keily Espino PA-C     • docusate sodium  100 mg Oral HS DONELL Foreman     • famotidine  40 mg Oral BID Fransisco Mosher PA-C     • haloperidol lactate  5 mg Intramuscular Q4H PRN Max 4/day Chelle Andersen MD     • hydrOXYzine HCL  25 mg Oral Q6H PRN Max 4/day Chelle Andersen MD     • LORazepam  1 mg Intramuscular Q6H PRN Max 3/day Chelle Andersen MD     • LORazepam  0 25 mg Oral QPM Lidia Esteban MD     • LORazepam  0 5 mg Oral Q6H PRN DONELL Foreman     • melatonin  3 mg Oral HS Chelle Andersen MD     • mirtazapine  37 5 mg Oral HS Melita Muniz DO     • pantoprazole  40 mg Oral BID Fransisco Mosher PA-C     • polyethylene glycol  17 g Oral Daily PRN Chelle Andersen MD     • propranolol  5 mg Oral Q8H PRN Chelle Andersen MD     • risperiDONE  0 25 mg Oral Q4H PRN Max 6/day Chelle Andersen MD     • risperiDONE  0 5 mg Oral Q4H PRN Max 3/day Chelle Andersen MD     • risperiDONE  1 mg Oral Q2H PRN Max 3/day Chelle Andersen MD     • senna-docusate sodium  1 tablet Oral Daily PRN Chelle Andersen MD     • torsemide  10 mg Oral Daily Barbara Salguero DO     • traZODone  100 mg Oral HS PRN Lidia Esteban MD     • warfarin  4 mg Oral Daily (warfarin) Fransisco Mosher PA-C

## 2022-10-24 NOTE — NURSING NOTE
Pt visible on unit, walking in hallways  Pt appears calm currently  Limited intake at meals  Medication compliant

## 2022-10-24 NOTE — PLAN OF CARE
Pt  Is attending groups with minimal prompting yet becomes increasingly quiet in the sessions    Problem: Ineffective Coping  Goal: Participates in unit activities  Description: Interventions:  - Provide therapeutic environment   - Provide required programming   - Redirect inappropriate behaviors   Outcome: Progressing

## 2022-10-24 NOTE — PROGRESS NOTES
10/24/22 1000   Activity/Group Checklist   Group Community meeting  (topic goal setting/ responsibility in recovery )   Attendance Attended   Attendance Duration (min) 31-45   Interactions Other (Comment)  (Pt  needed prompting to engage in group disucssion and was unable to think of  leisure options without cues )   Affect/Mood Constricted   Goals Achieved Able to listen to others; Able to engage in interactions; Discussed coping strategies

## 2022-10-24 NOTE — NURSING NOTE
Pt pt is visible in milieu watching television this evening  Pt is appears anxious but refuses PRN at this time  Will continue to monitor

## 2022-10-24 NOTE — CASE MANAGEMENT
Rec'd call from Hari Fuentes, Demond Yap, tel# [phone], fax# [phone]; reports she will be working with pt and able to assist with d/c planning  CM notified Shahida Finn d/c this week and recommendation for CBT therapist  Evie Crespo to email list of in network therapists to CM  Call made to pt's sister Dominic Dennis, tel# [phone]; reviewed pt's status  Alfaromalina Dennis reports having noticed improvement in pt during their conversations  Dominic Dennis reports pt's voice is normal, pt sounds good, less anxious, improved concentration during conversations  CM informed Dominic Dennis of pt's d/c this week; Dominic Dennis in agreement  Dominic Dennis requesting pt's appts with Dr Jorene Libman be on Mondays due to pt's other sister providing transport to these appts  CM informed Dominic Dennis of recommendation for in home therapy  Dominic Dennis reports pt has in home services provided through [address], tel# [phone]; unknown if they provide PT services  Call made to [place], spoke with [facility]  CM informed that services are provided through pt's [place], [place] team consists of SW, Therapist, and MD (not psychiatrist)  Lisa reports pt will have home visit scheduled upon d/c  Dominic Dennis reports they do not provide PT services

## 2022-10-24 NOTE — QUICK NOTE
sNa normal at 137 this am on torsemide 10mg daily and 1 8L/day fluid restriction  Would continue this upon d/c  Nephrology will sign off  Please call/text with questions

## 2022-10-25 LAB
DME PARACHUTE DELIVERY DATE REQUESTED: NORMAL
DME PARACHUTE ITEM DESCRIPTION: NORMAL
DME PARACHUTE ORDER STATUS: NORMAL
DME PARACHUTE SUPPLIER NAME: NORMAL
DME PARACHUTE SUPPLIER PHONE: NORMAL
SODIUM SERPL-SCNC: 139 MMOL/L (ref 135–147)

## 2022-10-25 PROCEDURE — 99232 SBSQ HOSP IP/OBS MODERATE 35: CPT | Performed by: STUDENT IN AN ORGANIZED HEALTH CARE EDUCATION/TRAINING PROGRAM

## 2022-10-25 PROCEDURE — 84295 ASSAY OF SERUM SODIUM: CPT

## 2022-10-25 PROCEDURE — 97116 GAIT TRAINING THERAPY: CPT

## 2022-10-25 PROCEDURE — 97110 THERAPEUTIC EXERCISES: CPT

## 2022-10-25 RX ADMIN — PANTOPRAZOLE SODIUM 40 MG: 40 TABLET, DELAYED RELEASE ORAL at 08:01

## 2022-10-25 RX ADMIN — FAMOTIDINE 40 MG: 20 TABLET ORAL at 17:17

## 2022-10-25 RX ADMIN — ATORVASTATIN CALCIUM 40 MG: 40 TABLET, FILM COATED ORAL at 17:17

## 2022-10-25 RX ADMIN — WARFARIN SODIUM 4 MG: 2 TABLET ORAL at 17:17

## 2022-10-25 RX ADMIN — FAMOTIDINE 40 MG: 20 TABLET ORAL at 08:01

## 2022-10-25 RX ADMIN — CARVEDILOL 37.5 MG: 12.5 TABLET, FILM COATED ORAL at 17:17

## 2022-10-25 RX ADMIN — ARIPIPRAZOLE 5 MG: 5 TABLET ORAL at 08:02

## 2022-10-25 RX ADMIN — MELATONIN TAB 3 MG 3 MG: 3 TAB at 21:16

## 2022-10-25 RX ADMIN — CARVEDILOL 37.5 MG: 12.5 TABLET, FILM COATED ORAL at 08:00

## 2022-10-25 RX ADMIN — AMLODIPINE BESYLATE 5 MG: 5 TABLET ORAL at 08:01

## 2022-10-25 RX ADMIN — PANTOPRAZOLE SODIUM 40 MG: 40 TABLET, DELAYED RELEASE ORAL at 21:16

## 2022-10-25 RX ADMIN — TORSEMIDE 10 MG: 20 TABLET ORAL at 08:01

## 2022-10-25 RX ADMIN — LORAZEPAM 0.25 MG: 0.5 TABLET ORAL at 17:17

## 2022-10-25 RX ADMIN — MIRTAZAPINE 45 MG: 30 TABLET, FILM COATED ORAL at 21:16

## 2022-10-25 NOTE — NURSING NOTE
Pt visible, pleasant on approach  Rates depression 5/10, anxiety 2/4, denies SI  Pt does endorse having a "stressful day" today  Compliant with fluid restriction  Took all HS medications  Will maintain q7min checks

## 2022-10-25 NOTE — PROGRESS NOTES
Progress Note - 2625 Neha Moses 58 y o  male MRN: [id number]  Unit/Bed#: Nadeem Millan 468-25 Encounter: [id number]    The patient was seen for continuing care and reviewed with treatment team   No EPS noted, patient is walking slowly with walker but able to walk faster when prompted  He continues to have concerns about his medications and he is fixated on dosage but responds to reassurance  He is intermittently anxious, easily gets overwhelmed today he was focussed on his clothes and how he looks but much more easily redirectable  He has been sleeping well and eating well  Some non- pitting lower extremity edema noted , which had been mentioned by nephrologist on 10/21, is improving  Patient's Na level is normal at 137mg pt will continue Torsemide 10mg daily and 1 8L/ day fluid at discharge  Also INR is now therapeutic and lovenox was discontinued yesterday  - plan for INR check tomorrow    /68 (BP Location: Left arm)   Pulse 95   Temp 97 9 °F (36 6 °C) (Temporal)   Resp 16   Ht 5' 8" (1 727 m)   Wt 84 4 kg (186 lb 1 1 oz)   SpO2 96%   BMI 28 29 kg/m²     Current Mental Status Evaluation:  Appearance:  Poor eye contact but able to make eye contact with prompting, left residual hand tremor, uses a walker   Behavior:  calm, cooperative and psychomotor retardation   Mood:  Anxious   Affect: constricted and calm   Speech: Normal volume and Normal rate   Thought Process:   worries about discharge but at same time reports she wants to go home   Thought Content:  Does not verbalize delusional material   Perceptual Disturbances: Denies hallucinations and does not appear to be responding to internal stimuli   Risk Potential: No suicidal or homicidal ideation   Orientation:   Patient is alert, awake and oriented x 3   Patient has limited but improved insight , judgement and impulse control         Recent Results (from the past 72 hour(s))   Protime-INR    Collection Time: 10/23/22  6:33 AM Result Value Ref Range    Protime 25 9 (H) 11 6 - 14 5 seconds    INR 2 32 (H) 0 84 - 1 19   Sodium    Collection Time: 10/23/22  6:44 AM   Result Value Ref Range    Sodium 138 135 - 147 mmol/L   Sodium    Collection Time: 10/24/22  6:16 AM   Result Value Ref Range    Sodium 137 135 - 147 mmol/L   Sodium    Collection Time: 10/25/22  6:07 AM   Result Value Ref Range    Sodium 139 135 - 147 mmol/L   Wheeled Walker    Collection Time: 10/25/22 11:58 AM   Result Value Ref Range    Supplier Name [facility] [facility]     Supplier Phone Number [phone]     Order Status Pending Delivery Ticket     Delivery Note      Delivery Request Date 10/25/2022     Item Description Cassie Cox, Adult      Progress Toward Goals: No significant events in the past 24 hours, Slowly improving   Patient is tolerating the recent medication changes  He reports trouble ambulating, seem walking slowly with walker, there is no reason why patient should have trouble ambulating at this time  Principal Problem:    SHIMON (generalized anxiety disorder)  Active Problems:    Hypertension    GERD (gastroesophageal reflux disease)    Atrial fibrillation ([facility])    Class 2 obesity in adult    History of stroke    Antiphospholipid antibody with hypercoagulable state ([facility] )    Hyperbilirubinemia    CORIE (obstructive sleep apnea)    Occasional tremors    PVD (peripheral vascular disease) ([facility])    Medical clearance for incarceration    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 attack    Mild protein-calorie malnutrition ([facility])    Sinus arrhythmia    Supratherapeutic INR    Hyponatremia    SIADH (syndrome of inappropriate ADH production) ([facility] )    Subclinical hyperthyroidism    Discharge planning update: The patient will return to previous living arrangement  Tentatively this week  Recommended Treatment: Continue with pharmacotherapy, group therapy, milieu therapy and occupational therapy    The patient will be maintained on the following medications:  Current Facility-Administered Medications   Medication Dose Route Frequency Provider Last Rate   • acetaminophen  650 mg Oral Q4H PRN Brinda MD Gini     • acetaminophen  650 mg Oral Q4H PRN Brinda Rubalcava MD     • acetaminophen  975 mg Oral Q6H PRN Brinda MD Gini     • aluminum-magnesium hydroxide-simethicone  30 mL Oral Q4H PRN Brindalobito Rubalcava MD     • amLODIPine  5 mg Oral Daily Keily Espino PA-C     • ARIPiprazole  5 mg Oral Daily Katelynn Reyes MD     • atorvastatin  40 mg Oral Daily With Maximo Schirmer, MD     • benztropine  1 mg Intramuscular Q4H PRN Max 6/day Brindalobito Rubalcava MD     • benztropine  0 5 mg Oral Q4H PRN Max 6/day Laura Valenzuela MD     • carvedilol  37 5 mg Oral BID With Meals Keily Espino PA-C     • docusate sodium  100 mg Oral HS DONELL Cornejo     • famotidine  40 mg Oral BID Odette Sharma PA-C     • haloperidol lactate  5 mg Intramuscular Q4H PRN Max 4/day Brinda Rubalcava MD     • hydrOXYzine HCL  25 mg Oral Q6H PRN Max 4/day Brinda Rubalcava MD     • LORazepam  1 mg Intramuscular Q6H PRN Max 3/day Brinda Rubalcava MD     • LORazepam  0 25 mg Oral QPM Katelynn Reyes MD     • LORazepam  0 5 mg Oral Q6H PRN DONELL Cornejo     • melatonin  3 mg Oral HS Brinda Rubalcava MD     • mirtazapine  45 mg Oral HS Katelynn Reyes MD     • pantoprazole  40 mg Oral BID Odette Sharma PA-C     • polyethylene glycol  17 g Oral Daily PRN Brindalobito Rubalcava MD     • propranolol  5 mg Oral Q8H PRN Brindalobito Rubalcava MD     • risperiDONE  0 25 mg Oral Q4H PRN Max 6/day Brinda Shelter, MD     • risperiDONE  0 5 mg Oral Q4H PRN Max 3/day Brinda Shelter, MD     • risperiDONE  1 mg Oral Q2H PRN Max 3/day Brinda Shelter, MD     • senna-docusate sodium  1 tablet Oral Daily PRN Brinda [facility], MD     • torsemide  10 mg Oral Daily Alivia Douglas, DO     • traZODone  100 mg Oral HS PRN Katelynn Reyes MD     • warfarin  4 mg Oral Daily (warfarin) Teagan Lam PA-C

## 2022-10-25 NOTE — PLAN OF CARE
Problem: PHYSICAL THERAPY ADULT  Goal: Performs mobility at highest level of function for planned discharge setting  See evaluation for individualized goals  Description: Treatment/Interventions: ADL retraining, Functional transfer training, LE strengthening/ROM, Elevations, Therapeutic exercise, Endurance training, Patient/family training, Equipment eval/education, Bed mobility, Gait training, Spoke to nursing, Spoke to case management, OT  Equipment Recommended: Honorio Meredith       See flowsheet documentation for full assessment, interventions and recommendations  Outcome: Not Progressing  Note: Prognosis: Fair  Problem List: Decreased strength, Decreased endurance, Impaired balance, Decreased mobility, Decreased coordination, Decreased cognition, Impaired sensation, Pain  Assessment: Pt seen for PT treatment session this date with interventions consisting of gait training to normalize gait pattern to decrease fall risk and therapeutic activity to improve transfers and increase activity tolerance with functional mobility to decrease fall risk  Pt agreeable to PT treatment session upon arrival, pt found seated OOB in chair, in no apparent distress  Since previous session, pt has made fair progress as evidenced by increased pain and decreased balance  Barriers during this session include pain and fatigue  Pt continues to be functioning below baseline level, and remains limited 2* factors listed above and including impaired activity tolerance, decreased balance, poor safety awareness and pain  Pt prognosis for achieving goals is fair, pending pt progress with hospitalization/medical status improvements, and indicated by ability to follow cues and continued required assistance  PT will continue to see pt during current hospitalization in order to address the deficits listed above and provide interventions consistent w/ POC in effort to achieve goals   Current goals and POC remain appropriate, pt continues to have rehab potential  Continue to recommend post acute rehabilitation services at time of d/c in order to maximize pt's functional independence and safety w/ mobility  Upon conclusion pt seated OOB in chair  The patient's AM-PAC Basic Mobility Inpatient Short Form Raw Score is 18  A Raw score of greater than 16 suggests the patient may benefit from discharge to home  Please also refer to the recommendation of the Physical Therapist for safe discharge planning  Barriers to Discharge: Inaccessible home environment, Decreased caregiver support     PT Discharge Recommendation: Post acute rehabilitation services    See flowsheet documentation for full assessment

## 2022-10-25 NOTE — CASE MANAGEMENT
CM met with pt, reviewed IMM notice; Pt unable to sign due to increased trembling hands  Pt reports concern for discharge due to not being able to walk  JORGE reviewed pt's progress with PT and plan for home PT  Pt cont to report he can not walk  JORGE notified pt's RN and sent TT to [facility], PT requesting pt to be seen

## 2022-10-25 NOTE — NURSING NOTE
Patient visible on the unit for the majority of the shift sitting with peers in the dayroom  Patient brightens slightly on approach for assessment questions, however then reports feeling "terrible"  Patient reporting he is not ready for discharge, reassured  Patient reporting 6/10 depression and 3/4 anxiety, denies SI/HI/AVH  Patient notably more anxious during medication pass, stating things like "Oh no" repetitively and "Blanca Laser me you really are"  Patient reassured and redirectable  Compliant with medications and meals, appetite good  Ambulating steadily with a RW despite insisting he cannot ambulate  No further signs of distress noted  VSS

## 2022-10-25 NOTE — CASE MANAGEMENT
Rec'd in basket message from [facility] intake; pt scheduled for appt with Dr Elvira Damon on Mon 10/31 at [facility]  CM met with pt, reviewed plan for d/c tomorrow and conversation with pt's sister yesterday  CM reviewed follow up with [place] for in home check, need for referral for home PT, and appt with Dr Elvira Damon  Pt reports not knowing if he will remember everything  CM notified pt that d/c time is not set; CM to discuss time with pt's sister  Pt verbalized being worried about needing clothes for d/c and requested to take hospital r/w  Pt reports having r/w at home but worried about how he would get to sister's car  CM provided support and reviewed d/c process  Multiple calls made to pt's sister Juan Antonio Mesakeri referral sent to [facility] with request for home PT services; awaiting determination

## 2022-10-25 NOTE — NURSING NOTE
Patient is pleasant and cooperative   Patient continues to be anxious and preoccupied with his anxious thoughts  Patient is preoccupied with his clothing and shoes stating he doesn't look good and people will laugh at me  Rated depression 5/10 and anxiety 3/4  PT saw patient this afternoon

## 2022-10-25 NOTE — PLAN OF CARE
Problem: Ineffective Coping  Goal: Identifies ineffective coping skills  Outcome: Progressing  Goal: Identifies healthy coping skills  Outcome: Progressing  Goal: Demonstrates healthy coping skills  Outcome: Progressing  Goal: Patient/Family participate in treatment and DC plans  Description: Interventions:  - Provide therapeutic environment  Outcome: Progressing  Goal: Patient/Family verbalizes awareness of resources  Outcome: Progressing  Goal: Understands least restrictive measures  Description: Interventions:  - Utilize least restrictive behavior  Outcome: Progressing  Goal: Free from restraint events  Description: - Utilize least restrictive measures   - Provide behavioral interventions   - Redirect inappropriate behaviors   Outcome: Progressing     Problem: Anxiety  Goal: Anxiety is at manageable level  Description: Interventions:  - Assess and monitor patient's anxiety level  - Monitor for signs and symptoms (heart palpitations, chest pain, shortness of breath, headaches, nausea, feeling jumpy, restlessness, irritable, apprehensive)  - Collaborate with interdisciplinary team and initiate plan and interventions as ordered  - [place] patient to unit/surroundings  - Explain treatment plan  - Encourage participation in care  - Encourage verbalization of concerns/fears  - Identify coping mechanisms  - Assist in developing anxiety-reducing skills  - Administer/offer alternative therapies  - Limit or eliminate stimulants  Outcome: Progressing     Problem: Nutrition/Hydration-ADULT  Goal: Nutrient/Hydration intake appropriate for improving, restoring or maintaining nutritional needs  Description: Monitor and assess patient's nutrition/hydration status for malnutrition  Collaborate with interdisciplinary team and initiate plan and interventions as ordered  Monitor patient's weight and dietary intake as ordered or per policy  Utilize nutrition screening tool and intervene as necessary   Determine patient's food preferences and provide high-protein, high-caloric foods as appropriate       INTERVENTIONS:  - Monitor oral intake, urinary output, labs, and treatment plans  - Assess nutrition and hydration status and recommend course of action  - Evaluate amount of meals eaten  - Assist patient with eating if necessary   - Allow adequate time for meals  - Recommend/ encourage appropriate diets, oral nutritional supplements, and vitamin/mineral supplements  - Order, calculate, and assess calorie counts as needed  - Recommend, monitor, and adjust tube feedings and TPN/PPN based on assessed needs  - Assess need for intravenous fluids  - Provide specific nutrition/hydration education as appropriate  - Include patient/family/caregiver in decisions related to nutrition  Outcome: Progressing

## 2022-10-25 NOTE — TREATMENT TEAM
10/25/22 0833   Team Meeting   Meeting Type Daily Rounds   Initial Conference Date 10/25/22   Team Members Present   Team Members Present ;Physician;Nurse;;Occupational Therapist   Physician Team Member Dr Mitesh Arredondo Team Member [id number] Management Team Member Seymour Mesa Work Team Member Dori   OT Team Member Deondre GUTIERREZ   Patient/Family Present   Patient Present No   Patient's Family Present No     Depression 5/10, anxiety 2/4, denies SI, compliant with fluid restriction, Na 137, med compliant, sleeping and eating well, abilify reduced, mirtazapine increased

## 2022-10-25 NOTE — PROGRESS NOTES
10/25/22 1000 10/25/22 1100 10/25/22 1300   Activity/Group Checklist   Group Exercise Other (Comment)  ( Recovery) Pet therapy   Attendance Did not attend Attended Attended   Attendance Duration (min)  --  31-45 16-30   Interactions  --  Interacted appropriately Other (Comment)  (Pt  needed extra time and encouargement to join peers in the dayroom,standing by self in hallway before taking a group room seat )   Affect/Mood  --  Appropriate Constricted   Goals Achieved  --  Able to engage in interactions; Able to listen to others; Able to self-disclose; Identified feelings; Discussed coping strategies Able to listen to others  (Pt  briefly pet therapy dog )

## 2022-10-25 NOTE — PLAN OF CARE
Pt  Attended 2 of 3 groups needing encouragement to interact on group topics    Problem: Ineffective Coping  Goal: Participates in unit activities  Description: Interventions:  - Provide therapeutic environment   - Provide required programming   - Redirect inappropriate behaviors   Outcome: Progressing

## 2022-10-25 NOTE — PHYSICAL THERAPY NOTE
PT Treatment Note    NAME:  Adrianne Fried  DATE: 10/25/22    AGE:   58 y o  Mrn:   [id number]  ADMIT DX:  Anxiety [F41 9]  Paroxysmal atrial fibrillation (HCC) [I48 0]  Depression [F32  A]  Anticoagulated on Coumadin [Z79 01]  Encounter for psychological evaluation [Z00 8]  Performed at least 2 patient identifiers during session: Name and Birthday       10/25/22 1500   PT Last Visit   PT Visit Date 10/25/22   Note Type   Note Type Treatment   Pain Assessment   Pain Assessment Tool 0-10   Pain Score 5   Pain Location/Orientation Orientation: Bilateral  (ankles)   Pain Onset/Description Descriptor: Aching   Restrictions/Precautions   Weight Bearing Precautions Per Order No   Other Precautions Pain   General   Chart Reviewed Yes   Family/Caregiver Present No   Cognition   Overall Cognitive Status WFL   Arousal/Participation Alert   Attention Difficulty attending to directions   Orientation Level Oriented X4   Memory Within functional limits   Following Commands Follows one step commands with increased time or repetition   Comments pt easily distracted   Subjective   Subjective pt reports that he doesn't feel safe to return home due to instability with his mobility and he is afraid of falling   Transfers   Sit to Stand 5  Supervision   Additional items Verbal cues; Increased time required;Armrests   Stand to Sit 5  Supervision   Additional items Verbal cues; Increased time required;Armrests   Ambulation/Elevation   Gait pattern Improper Weight shift; Forward Flexion;Decreased foot clearance;Shuffling; Short stride; Excessively slow   Ambulation/Elevation Additional Comments when pt gets distracted he "gets stuck" in a position and can't make decisions on the best mobility tactics; difficulty with turns   Balance   Static Sitting Good   Dynamic Sitting Fair +   Static Standing Fair   Dynamic Standing Fair -   Ambulatory Fair -   Endurance Deficit   Endurance Deficit Yes   Endurance Deficit Description fatigue Activity Tolerance   Activity Tolerance Patient limited by fatigue   Nurse Made Aware spoke the CNAs   Exercises   Hip Flexion Sitting;20 reps;AROM; Bilateral   Hip Abduction Sitting;20 reps;AROM; Bilateral   Hip Adduction Sitting;20 reps;AROM; Bilateral   Knee AROM Long Arc Quad Sitting;20 reps;AROM; Bilateral   Ankle Pumps Sitting;20 reps;AROM; Bilateral  (x20 coordination taps)   Balance training  20 toe taps to the front and side   Assessment   Prognosis Fair   Assessment Pt seen for PT treatment session this date with interventions consisting of gait training to normalize gait pattern to decrease fall risk and therapeutic activity to improve transfers and increase activity tolerance with functional mobility to decrease fall risk  Pt agreeable to PT treatment session upon arrival, pt found seated OOB in chair, in no apparent distress  Since previous session, pt has made fair progress as evidenced by increased pain and decreased balance  Barriers during this session include pain and fatigue  Pt continues to be functioning below baseline level, and remains limited 2* factors listed above and including impaired activity tolerance, decreased balance, poor safety awareness and pain  Pt prognosis for achieving goals is fair, pending pt progress with hospitalization/medical status improvements, and indicated by ability to follow cues and continued required assistance  PT will continue to see pt during current hospitalization in order to address the deficits listed above and provide interventions consistent w/ POC in effort to achieve goals  Current goals and POC remain appropriate, pt continues to have rehab potential  Continue to recommend post acute rehabilitation services at time of d/c in order to maximize pt's functional independence and safety w/ mobility  Upon conclusion pt seated OOB in chair  The patient's AM-PAC Basic Mobility Inpatient Short Form Raw Score is 18   A Raw score of greater than 16 suggests the patient may benefit from discharge to home  Please also refer to the recommendation of the Physical Therapist for safe discharge planning  Barriers to Discharge Inaccessible home environment;Decreased caregiver support   Goals   Patient Goals to get better with his walking before he goes home   PT Treatment Day 4   Plan   Progress Slow progress, decreased activity tolerance   Recommendation   PT Discharge Recommendation Post acute rehabilitation services   Equipment Recommended [facility] Recommended Wheeled walker   Change/add to Versus?  No   AM-PAC Basic Mobility Inpatient   Turning in Bed Without Bedrails 3   Lying on Back to Sitting on Edge of Flat Bed 3   Moving Bed to Chair 3   Standing Up From Chair 3   Walk in Room 3   Climb 3-5 Stairs 3   Basic Mobility Inpatient Raw Score 18   Basic Mobility Standardized Score 41 05   Highest Level Of Mobility   JH-HLM Goal 6: Walk 10 steps or more   JH-HLM Achieved 7: Walk 25 feet or more       Time In: 1359  Time Out: 1422  Total Treatment Minutes: Kirill WHITE  8

## 2022-10-26 ENCOUNTER — TELEPHONE (OUTPATIENT)
Dept: FAMILY MEDICINE CLINIC | Facility: CLINIC | Age: 62
End: 2022-10-26

## 2022-10-26 VITALS
HEIGHT: 68 IN | HEART RATE: 105 BPM | DIASTOLIC BLOOD PRESSURE: 77 MMHG | TEMPERATURE: 96.8 F | SYSTOLIC BLOOD PRESSURE: 118 MMHG | BODY MASS INDEX: 28.2 KG/M2 | OXYGEN SATURATION: 96 % | WEIGHT: 186.07 LBS | RESPIRATION RATE: 16 BRPM

## 2022-10-26 LAB
INR PPP: 2.2 (ref 0.84–1.19)
PROTHROMBIN TIME: 24.8 SECONDS (ref 11.6–14.5)
SODIUM SERPL-SCNC: 138 MMOL/L (ref 135–147)

## 2022-10-26 PROCEDURE — 84295 ASSAY OF SERUM SODIUM: CPT

## 2022-10-26 PROCEDURE — 85610 PROTHROMBIN TIME: CPT | Performed by: STUDENT IN AN ORGANIZED HEALTH CARE EDUCATION/TRAINING PROGRAM

## 2022-10-26 RX ORDER — PANTOPRAZOLE SODIUM 40 MG/1
40 TABLET, DELAYED RELEASE ORAL 2 TIMES DAILY
Qty: 60 TABLET | Refills: 0 | Status: ON HOLD | OUTPATIENT
Start: 2022-10-26

## 2022-10-26 RX ORDER — LORAZEPAM 0.5 MG/1
0.25 TABLET ORAL
Refills: 0 | Status: ON HOLD
Start: 2022-10-26 | End: 2022-11-05

## 2022-10-26 RX ORDER — CARVEDILOL 25 MG/1
37.5 TABLET ORAL 2 TIMES DAILY WITH MEALS
Qty: 45 TABLET | Refills: 0 | Status: ON HOLD | OUTPATIENT
Start: 2022-10-26

## 2022-10-26 RX ORDER — MIRTAZAPINE 45 MG/1
45 TABLET, FILM COATED ORAL
Qty: 45 TABLET | Refills: 0
Start: 2022-10-26 | End: 2022-10-31 | Stop reason: SDUPTHER

## 2022-10-26 RX ORDER — ARIPIPRAZOLE 5 MG/1
5 TABLET ORAL DAILY
Qty: 30 TABLET | Refills: 0 | Status: SHIPPED | OUTPATIENT
Start: 2022-10-27 | End: 2022-10-31

## 2022-10-26 RX ORDER — DOCUSATE SODIUM 100 MG/1
100 CAPSULE, LIQUID FILLED ORAL
Qty: 30 CAPSULE | Refills: 0 | Status: ON HOLD | OUTPATIENT
Start: 2022-10-26

## 2022-10-26 RX ORDER — TORSEMIDE 10 MG/1
10 TABLET ORAL DAILY
Qty: 30 TABLET | Refills: 0 | Status: ON HOLD | OUTPATIENT
Start: 2022-10-27

## 2022-10-26 RX ORDER — CHOLECALCIFEROL (VITAMIN D3) 125 MCG
5 CAPSULE ORAL
Qty: 30 TABLET | Refills: 0 | Status: ON HOLD | OUTPATIENT
Start: 2022-10-26

## 2022-10-26 RX ORDER — AMLODIPINE BESYLATE 5 MG/1
5 TABLET ORAL DAILY
Qty: 30 TABLET | Refills: 0 | Status: ON HOLD | OUTPATIENT
Start: 2022-10-27

## 2022-10-26 RX ADMIN — ARIPIPRAZOLE 5 MG: 5 TABLET ORAL at 08:16

## 2022-10-26 RX ADMIN — PANTOPRAZOLE SODIUM 40 MG: 40 TABLET, DELAYED RELEASE ORAL at 08:16

## 2022-10-26 RX ADMIN — TORSEMIDE 10 MG: 20 TABLET ORAL at 08:15

## 2022-10-26 RX ADMIN — FAMOTIDINE 40 MG: 20 TABLET ORAL at 08:18

## 2022-10-26 RX ADMIN — AMLODIPINE BESYLATE 5 MG: 5 TABLET ORAL at 08:14

## 2022-10-26 RX ADMIN — CARVEDILOL 37.5 MG: 12.5 TABLET, FILM COATED ORAL at 08:18

## 2022-10-26 NOTE — CASE MANAGEMENT
Pt accepted by Soham Denise in home care agency for pt's home PT needs  Pt's r/w approved by Juan Orozco for deliver from [facility] consignment  Pt scheduled for outpatient appt with Dr Juancho Cabrera 10/31 at [phone]  List of in network therapists provided to pt  CM spoke with pt's sister Emmanuel Odell; reviewed above information, remains in agreement with pt's d/c and will  at 1330  CM provided pt with information for Leo Houser, [facility]  and [place] program  Call made to [employer], tel# [phone], notified of pt's d/c today

## 2022-10-26 NOTE — TREATMENT TEAM
10/26/22 0835   Team Meeting   Meeting Type Daily Rounds   Initial Conference Date 10/26/22   Team Members Present   Team Members Present ;Physician;Nurse;;Occupational Therapist   Physician Team Member Dr Jose Alfredo Lomax Team Member [id number] Management Team Member Seymour Mesa Work Team Member Dori   OT Team Member Angela Barahona T   Patient/Family Present   Patient Present No   Patient's Family Present No     Reports depression 6/10, anxiety 3/4, increase anxiety during med pass, redirectable and med compliant, ambulating steadily with r/w, insists he can not walk, reports not ready for discharge

## 2022-10-26 NOTE — NURSING NOTE
Patient is for discharge today  Patient is anxious and preoccupied   Belongings reviewed with him and AVS explained to patient  Patient had a lot of questions which were explained by nurse  Nurse also spoke about his medications to his sister who came pick patient up

## 2022-10-26 NOTE — PLAN OF CARE
Problem: DISCHARGE PLANNING  Goal: Discharge to home or other facility with appropriate resources  Description: INTERVENTIONS:  - Identify barriers to discharge w/patient and caregiver  - Arrange for needed discharge resources and transportation as appropriate  - Identify discharge learning needs (meds, wound care, etc )  - Arrange for interpretive services to assist at discharge as needed  - Refer to Case Management Department for coordinating discharge planning if the patient needs post-hospital services based on physician/advanced practitioner order or complex needs related to functional status, cognitive ability, or social support system  Outcome: Completed     Problem: Ineffective Coping  Goal: Identifies ineffective coping skills  Outcome: Completed  Goal: Identifies healthy coping skills  Outcome: Completed  Goal: Demonstrates healthy coping skills  Outcome: Completed  Goal: Participates in unit activities  Description: Interventions:  - Provide therapeutic environment   - Provide required programming   - Redirect inappropriate behaviors   Outcome: Completed  Goal: Patient/Family participate in treatment and DC plans  Description: Interventions:  - Provide therapeutic environment  Outcome: Completed  Goal: Patient/Family verbalizes awareness of resources  Outcome: Completed  Goal: Understands least restrictive measures  Description: Interventions:  - Utilize least restrictive behavior  Outcome: Completed  Goal: Free from restraint events  Description: - Utilize least restrictive measures   - Provide behavioral interventions   - Redirect inappropriate behaviors   Outcome: Completed     Problem: Anxiety  Goal: Anxiety is at manageable level  Description: Interventions:  - Assess and monitor patient's anxiety level  - Monitor for signs and symptoms (heart palpitations, chest pain, shortness of breath, headaches, nausea, feeling jumpy, restlessness, irritable, apprehensive)     - Collaborate with interdisciplinary team and initiate plan and interventions as ordered  - Almond patient to unit/surroundings  - Explain treatment plan  - Encourage participation in care  - Encourage verbalization of concerns/fears  - Identify coping mechanisms  - Assist in developing anxiety-reducing skills  - Administer/offer alternative therapies  - Limit or eliminate stimulants  Outcome: Completed     Problem: Nutrition/Hydration-ADULT  Goal: Nutrient/Hydration intake appropriate for improving, restoring or maintaining nutritional needs  Description: Monitor and assess patient's nutrition/hydration status for malnutrition  Collaborate with interdisciplinary team and initiate plan and interventions as ordered  Monitor patient's weight and dietary intake as ordered or per policy  Utilize nutrition screening tool and intervene as necessary  Determine patient's food preferences and provide high-protein, high-caloric foods as appropriate       INTERVENTIONS:  - Monitor oral intake, urinary output, labs, and treatment plans  - Assess nutrition and hydration status and recommend course of action  - Evaluate amount of meals eaten  - Assist patient with eating if necessary   - Allow adequate time for meals  - Recommend/ encourage appropriate diets, oral nutritional supplements, and vitamin/mineral supplements  - Order, calculate, and assess calorie counts as needed  - Recommend, monitor, and adjust tube feedings and TPN/PPN based on assessed needs  - Assess need for intravenous fluids  - Provide specific nutrition/hydration education as appropriate  - Include patient/family/caregiver in decisions related to nutrition  Outcome: Completed

## 2022-10-26 NOTE — PROGRESS NOTES
10/26/22 1000   Activity/Group Checklist   Group Community meeting  (bucket list task on leisure events )   Attendance Attended   Attendance Duration (min) 31-45   Interactions Other (Comment)  (Pt  moving very slowly needed extra time to open the cap on his marker for task and did not contribute to topic )   Affect/Mood Constricted   Goals Achieved Able to listen to others

## 2022-10-26 NOTE — PROGRESS NOTES
Pt attended short term problem solving group  Pt was restless and repetitive in comments  Pt not able to engage in meaningful therapeutic discussion easily        10/26/22 1100   Activity/Group Checklist   Group Other (Comment)  (short term problem solving)   Attendance Attended   Attendance Duration (min) 31-45   Interactions Other (Comment)  (restless)   Affect/Mood Constricted   Goals Achieved Able to listen to others

## 2022-10-26 NOTE — BH TRANSITION RECORD
Contact Information: If you have any questions, concerns, pended studies, tests and/or procedures, or emergencies regarding your inpatient behavioral health visit  Please contact [facility] older adult behavioral health unit 6T [phone] and ask to speak to a , nurse or physician  A contact is available 24 hours/ 7 days a week at this number  Summary of Procedures Performed During your Stay:  Below is a list of major procedures performed during your hospital stay and a summary of results:  - No major procedures performed  Pending Studies (From admission, onward)     Start     Ordered    10/07/22 1207  Protime-INR  Every Friday      Start Status   10/28/22 1207 Scheduled   11/04/22 1207 Scheduled       10/05/22 1206              Please follow up on the above pending studies with your PCP and/or referring provider

## 2022-10-26 NOTE — PLAN OF CARE
Problem: DISCHARGE PLANNING - CARE MANAGEMENT  Goal: Discharge to post-acute care or home with appropriate resources  Description: INTERVENTIONS:  - Conduct assessment to determine patient/family and health care team treatment goals, and need for post-acute services based on payer coverage, community resources, and patient preferences, and barriers to discharge  - Address psychosocial, clinical, and financial barriers to discharge as identified in assessment in conjunction with the patient/family and health care team  - Arrange appropriate level of post-acute services according to patient’s   needs and preference and payer coverage in collaboration with the physician and health care team  - Communicate with and update the patient/family, physician, and health care team regarding progress on the discharge plan  - Arrange appropriate transportation to post-acute venues  Outcome: Adequate for Discharge     Discharge home with sister Vance Alberto who will also provide transport  Outpatient follow up with DAIJA Cope, Home PT through [facility] in [address] [place] [address] meds sent to preferred pharmacy [facility]  List of in network therapists provided

## 2022-10-27 ENCOUNTER — TELEPHONE (OUTPATIENT)
Dept: CARDIOLOGY CLINIC | Facility: CLINIC | Age: 62
End: 2022-10-27

## 2022-10-27 NOTE — TELEPHONE ENCOUNTER
Called pt per in basket from Dr Cassy Sagastume pt seem confused and I stated I will let him settle in @ home and call him next week for post hospital  ov

## 2022-10-28 ENCOUNTER — TELEPHONE (OUTPATIENT)
Dept: PSYCHIATRY | Facility: CLINIC | Age: 62
End: 2022-10-28

## 2022-10-28 NOTE — TELEPHONE ENCOUNTER
Missael Bernabe from [facility] called and LM on nursing line for Dr Susan Kiran  She wanted to update you on Lennice Mend since he has a follow up appointment with you on Monday 10/31/22  He was discharged from the hospital on Abillify and Mirtazapine  She has some concerns as he is having gait difficulties and uses a walker and has not had to in the past  She said he has a shuffling gait  She wanted to mention he has tried Prozac in the past and was successful       Please reach out to Susan Shaikh if questions @ [phone]

## 2022-10-28 NOTE — TELEPHONE ENCOUNTER
Pt had a rep from [employer] [facility] call in to make sure the pt had an appt and when and what type it was  Writer explained the date and time as well as it was a vv  Pts rep said she was not sure if he'd make it but she was going to speak with the pts sister to check on things and may call back to change the appt

## 2022-10-31 ENCOUNTER — TELEMEDICINE (OUTPATIENT)
Dept: PSYCHIATRY | Facility: CLINIC | Age: 62
End: 2022-10-31

## 2022-10-31 DIAGNOSIS — F41.9 ANXIETY DISORDER: ICD-10-CM

## 2022-10-31 DIAGNOSIS — F32.A DEPRESSION, UNSPECIFIED DEPRESSION TYPE: ICD-10-CM

## 2022-10-31 DIAGNOSIS — F06.4 ANXIETY DISORDER DUE TO GENERAL MEDICAL CONDITION WITH PANIC ATTACK: ICD-10-CM

## 2022-10-31 DIAGNOSIS — F41.1 GAD (GENERALIZED ANXIETY DISORDER): Primary | ICD-10-CM

## 2022-10-31 DIAGNOSIS — F41.0 ANXIETY DISORDER DUE TO GENERAL MEDICAL CONDITION WITH PANIC ATTACK: ICD-10-CM

## 2022-10-31 RX ORDER — MIRTAZAPINE 45 MG/1
45 TABLET, FILM COATED ORAL
Qty: 30 TABLET | Refills: 2 | Status: ON HOLD | OUTPATIENT
Start: 2022-10-31 | End: 2022-11-30

## 2022-10-31 RX ORDER — ARIPIPRAZOLE 5 MG/1
5 TABLET ORAL
Qty: 30 TABLET | Refills: 2 | Status: ON HOLD | OUTPATIENT
Start: 2022-10-31 | End: 2022-11-30

## 2022-10-31 NOTE — PATIENT INSTRUCTIONS
Please call the office nursing staff for medication issues including refills, problems getting medications, bothersome side effects, etc at [phone]  Please return for a follow up appointment as discussed  If you are running late or are unable to attend your appointment, please call our [facility] office at [phone], or if you were seen in the [place] office, please call [phone]  Please present for your previously scheduled appointment approximately 15 minutes prior to appointment time  If you are running late or are unable to attend your appointment, please call our [facility] office at [phone] or our [place] office at [phone] if applicable to notify the office of your absence  If you are in psychological crisis including not limited to suicidal/homicidal thoughts or thoughts of self-injury, do not hesitate to call/contact your [facility] hotline (see below) or attend to the nearest emergency department    [facility] Crisis: [address] [facility] Crisis: [phone]  [address] Crisis: [address] Crisis: [address] Crisis: [address] Crisis: [address]  Crisis: [phone]  National Suicide Prevention Hotline: 4-991.174.5369

## 2022-10-31 NOTE — PSYCH
Virtual Visit Disclaimer & Required Documentation  TeleMed provider: Li Chairez MD  and Dr Ivett Rincon, located at [address] [facility] [address] in [place], [place], [facility]  The patient is also located in [place] which is the [facility] in which I hold an active license  The patient was identified by name and date of birth  Johana Sosa was informed that this is a telemedicine visit and that the visit is being conducted through [address] and patient was informed this is a secure, HIPAA-complaint platform  He agrees to proceed  My office door was closed  No one else was in the room  He acknowledged consent and understanding of privacy and security of the video platform  The patient has agreed to participate and understands they can discontinue the visit at any time  Johana Sosa verbally agrees to participate in [employer]  Pt is aware that [employer] could be limited without vital signs or the ability to perform a full hands-on physical exam  The patient understands he or the provider may request at any time to terminate the video visit and request the patient to seek care or treatment in person  Patient is aware this is a billable service  Psychiatric Follow Up Visit - Behavioral Health   MRN: [id number]    Visit Time  Start: 8:15am  Stop: 9:15am    Total Visit Duration: 60 minutes    History of Present Illness   Johana Sosa is 58 y o  and now presenting to follow up for anxiety and insomnia  Rodolfo Reddy states that he feels weak from a physical standpoint and attributes this to his 1 month inpatient hospitalization this past month  He had a recent inpatient admission from 9/30-22-10/26/22 for severe anxiety, difficulty caring for self due to severe anxiety  He was trialed on an SSRI but developed hyponatremia, so this medication was discontinued    On discharge, which was 1 week ago, he was stabilized on mirtazapine 45 milligrams q h s  for anxiety depression and insomnia, Abilify 5 milligrams daily, and a 10 day supply of Ativan 0 25 milligrams daily to hold him over until next outpatient provider appointment  He feels that his anxiety is much improved after this hospitalization, but it is still present  His main concern is weakness, explaining that he does not eat much or drink much and he believes his weight is decreasing  He has difficulty ambulating around the house, but states physical therapy will be by Wednesday and regularly for appointments to continue working on his mobility  Some of his anxiety stems from this difficulty with ambulation, and he hopes moving forward with improvements in ambulation he may have better control of his anxiety  He does not endorse depressed mood, low motivation, guilt, low appetite, low energy, difficulty getting out of bed, and increased sleep throughout the night  He does not feel guilty, but endorses concentration issues  Denies SI, HI, AVH, OCD behaviors, manic episodes  He got out of the hospital 1 week ago, is living with his sister who helps with his day-to-day activities and stays on top of his appointments  He states he would like to continue with the medication regimen, has been compliant, and denies any side effects  He would like to follow-up in 1 month at agrees he will call us sooner if needed  Psychiatric Review Of Systems:  • Naty Ramos reports Symptoms as described in HPI  • Naty Ramos denies Current suicidal thoughts, plan, or intent, Current thoughts of self-harm, Current homicidal thoughts, plan, or intent, Panic attacks, Obsessive or compulsive symptoms, Trauma related symptoms, Hallucinations, Paranoid thoughts, History consistent with jasson or hypomania, Racing thoughts, Increased goal-directed activity, Decreased need for sleep or Excessive talkativeness      Medical Review Of Systems:  Complete review of systems is negative except as noted above     ------------------------------------  Past Medical History:   Diagnosis Date   • Aneurysm of thoracic aorta ([facility] )     last assessed 11/20/17   • Anxiety     currently on no meds   • Arthritis    • Bilateral inguinal hernia    • Cardiac disease    • Cognitive impairment    • CVA (cerebral vascular accident) ([facility] )    • Depression    • GERD (gastroesophageal reflux disease)    • Hearing loss of aging    • Heart disease    • Hyperlipidemia    • Hypertension    • Irritable bowel syndrome    • Kidney stone    • Obesity    • Occasional tremors     left arm since stroke   • Palpitations    • Panic attack    • PONV (postoperative nausea and vomiting)     and headache x 3 days   • Psychiatric illness    • Sciatica     right and left  side   • Shortness of breath     on exertion   • Sleep apnea     is not using a CPAP   • Sleep difficulties    • Spitting up blood 05/21/2021    Resolved   • Status post placement of implantable loop recorder    • Stroke ([facility] ) 11/2014   • Stroke ([place] [address] ) 03/2021   • TIA (transient ischemic attack)       Past Surgical History:   Procedure Laterality Date   • AORTA SURGERY      thoracic - aneurysmorrhaphy   • APPENDECTOMY     • ASCENDING AORTIC ANEURYSM REPAIR      resolved 8/19/15   • CARDIAC LOOP RECORDER     • CHOLECYSTECTOMY      laparoscopic   • COLONOSCOPY     • CYSTOSCOPY      with removal of object- stent removal   • KNEE ARTHROSCOPY Right 1996    with medial meniscus repair   • LITHOTRIPSY      renal   • ORTHOPEDIC SURGERY     • NM FRAGMENT KIDNEY STONE/ ESWL Left 5/17/2018    Procedure: LITHROTRIPSY EXTRACORPORAL SHOCKWAVE (ESWL);   Surgeon: Mao Salazar MD;  Location: [facility];  Service: Urology   • NM Ameena Raymundo 19 Bilateral 12/9/2021    Procedure: ROBOTIC REPAIR HERNIA INGUINAL;  Surgeon: Rohan Turcios MD;  Location: [facility];  Service: General   • THUMB ARTHROSCOPY Right 1976    ligament repair   • UPPER GASTROINTESTINAL ENDOSCOPY         Visit Vitals  Smoking Status Never Smoker      Wt Readings from Last 6 Encounters:   10/25/22 84 4 kg (186 lb 1 1 oz)   09/28/22 95 7 kg (211 lb)   09/26/22 95 7 kg (211 lb)   09/21/22 97 1 kg (214 lb)   09/21/22 98 2 kg (216 lb 9 6 oz)   09/14/22 98 2 kg (216 lb 6 4 oz)        Mental Status Exam:  Appearance:  alert and Unable to assess   Behavior:  calm and limited cooperativity   Motor: Unable to assess   Speech:  spontaneous, soft, scant and coherent    Mood:  anxious   Affect:  constricted, dysphoric and anxious   Thought Process:  Organized, logical, goal-directed   Thought Content: no verbalized delusions or overt paranoia   Perceptual disturbances: no reported hallucinations and does not appear to be responding to internal stimuli at this time   Risk Potential: No active or passive suicidal or homicidal ideation was verbalized during interview   Cognition: oriented to self and situation, appears to be of average intelligence and cognition not formally tested   Insight:  Fair   Judgment: Fair       Meds/Allergies    Allergies   Allergen Reactions   • Losartan Angioedema   • Aspirin Fatigue     Due to blood thinners     • Bactrim [Sulfamethoxazole-Trimethoprim]    • Eliquis [Apixaban] Other (See Comments)     Failed  Had embolic CVA   • Lisinopril      Felt bad, was OK with Zestril brand name     • Tramadol      Current Outpatient Medications   Medication Instructions   • amLODIPine (NORVASC) 5 mg, Oral, Daily   • ARIPiprazole (ABILIFY) 5 mg, Oral, Daily   • atorvastatin (LIPITOR) 40 mg, Oral, Daily with dinner   • carvedilol (COREG) 37 5 mg, Oral, 2 times daily with meals   • docusate sodium (COLACE) 100 mg, Oral, Daily at bedtime   • famotidine (PEPCID) 40 mg, Oral, 2 times daily   • LORazepam (ATIVAN) 0 25 mg, Oral, Daily at bedtime   • Melatonin 5 mg, Oral, Daily at bedtime   • mirtazapine (REMERON) 45 mg, Oral, Daily at bedtime   • pantoprazole (PROTONIX) 40 mg, Oral, 2 times daily   • torsemide (DEMADEX) 10 mg, Oral, Daily   • warfarin (COUMADIN) 4 mg tablet TAKE 1 TABLET BY MOUTH EVERY DAY        Labs & Imaging:  I have personally reviewed all pertinent laboratory tests and imaging results  No results displayed because visit has over 200 results        Admission on 09/21/2022, Discharged on 09/22/2022   Component Date Value Ref Range Status   • WBC 09/21/2022 6 61  4 31 - 10 16 Thousand/uL Final   • RBC 09/21/2022 4 69  3 88 - 5 62 Million/uL Final   • Hemoglobin 09/21/2022 14 3  12 0 - 17 0 g/dL Final   • Hematocrit 09/21/2022 41 3  36 5 - 49 3 % Final   • MCV 09/21/2022 88  82 - 98 fL Final   • MCH 09/21/2022 30 5  26 8 - 34 3 pg Final   • MCHC 09/21/2022 34 6  31 4 - 37 4 g/dL Final   • RDW 09/21/2022 14 0  11 6 - 15 1 % Final   • MPV 09/21/2022 10 5  8 9 - 12 7 fL Final   • Platelets 26/74/3927 166  149 - 390 Thousands/uL Final   • nRBC 09/21/2022 0  /100 WBCs Final   • Neutrophils Relative 09/21/2022 74  43 - 75 % Final   • Immat GRANS % 09/21/2022 0  0 - 2 % Final   • Lymphocytes Relative 09/21/2022 16  14 - 44 % Final   • Monocytes Relative 09/21/2022 9  4 - 12 % Final   • Eosinophils Relative 09/21/2022 1  0 - 6 % Final   • Basophils Relative 09/21/2022 0  0 - 1 % Final   • Neutrophils Absolute 09/21/2022 4 83  1 85 - 7 62 Thousands/µL Final   • Immature Grans Absolute 09/21/2022 0 01  0 00 - 0 20 Thousand/uL Final   • Lymphocytes Absolute 09/21/2022 1 07  0 60 - 4 47 Thousands/µL Final   • Monocytes Absolute 09/21/2022 0 61  0 17 - 1 22 Thousand/µL Final   • Eosinophils Absolute 09/21/2022 0 07  0 00 - 0 61 Thousand/µL Final   • Basophils Absolute 09/21/2022 0 02  0 00 - 0 10 Thousands/µL Final   • Sodium 09/21/2022 135  135 - 147 mmol/L Final   • Potassium 09/21/2022 3 4 (A) 3 5 - 5 3 mmol/L Final   • Chloride 09/21/2022 101  96 - 108 mmol/L Final   • CO2 09/21/2022 27  21 - 32 mmol/L Final   • ANION GAP 09/21/2022 7  4 - 13 mmol/L Final   • BUN 09/21/2022 6  5 - 25 mg/dL Final   • Creatinine 09/21/2022 1 03  0 60 - 1 30 mg/dL Final    Standardized to IDMS reference method   • Glucose 09/21/2022 100  65 - 140 mg/dL Final    If the patient is fasting, the ADA then defines impaired fasting glucose as > 100 mg/dL and diabetes as > or equal to 123 mg/dL  Specimen collection should occur prior to Sulfasalazine administration due to the potential for falsely depressed results  Specimen collection should occur prior to Sulfapyridine administration due to the potential for falsely elevated results  • Calcium 09/21/2022 9 1  8 3 - 10 1 mg/dL Final   • AST 09/21/2022 22  5 - 45 U/L Final    Specimen collection should occur prior to Sulfasalazine administration due to the potential for falsely depressed results  • ALT 09/21/2022 31  12 - 78 U/L Final    Specimen collection should occur prior to Sulfasalazine and/or Sulfapyridine administration due to the potential for falsely depressed results  • Alkaline Phosphatase 09/21/2022 100  46 - 116 U/L Final   • Total Protein 09/21/2022 6 7  6 4 - 8 4 g/dL Final   • Albumin 09/21/2022 3 6  3 5 - 5 0 g/dL Final   • Total Bilirubin 09/21/2022 2 84 (A) 0 20 - 1 00 mg/dL Final    Use of this assay is not recommended for patients undergoing treatment with eltrombopag due to the potential for falsely elevated results  • eGFR 09/21/2022 77  ml/min/1 73sq m Final   • hs TnI 0hr 09/21/2022 12  "Refer to ACS Flowchart"- see link ng/L Final    Comment:                                              Initial (time 0) result  If >=50 ng/L, Myocardial injury suggested ;  Type of myocardial injury and treatment strategy  to be determined  If 5-49 ng/L, a delta result at 2 hours and or 4 hours will be needed to further evaluate  If <4 ng/L, and chest pain has been >3 hours since onset, patient may qualify for discharge based on the HEART score in the ED  If <5 ng/L and <3hours since onset of chest pain, a delta result at 2 hours will be needed to further evaluate      HS Troponin 99th Percentile URL of a Health Population=12 ng/L with a 95% Confidence Interval of 8-18 ng/L  Second Troponin (time 2 hours)  If calculated delta >= 20 ng/L,  Myocardial injury suggested ; Type of myocardial injury and treatment strategy to be determined  If 5-49 ng/L and the calculated delta is 5-19 ng/L, consult medical service for evaluation  Continue evaluation for ischemia on ecg and other possible etiology and repeat hs troponin at 4 hours  If delta                            is <5 ng/L at 2 hours, consider discharge based on risk stratification via the HEART score (if in ED), or MIGUELITO risk score in IP/Observation  HS Troponin 99th Percentile URL of a Health Population=12 ng/L with a 95% Confidence Interval of 8-18 ng/L     • Protime 09/21/2022 32 3 (A) 11 6 - 14 5 seconds Final   • INR 09/21/2022 3 12 (A) 0 84 - 1 19 Final   • SARS-CoV-2 09/21/2022 Negative  Negative Final        • INFLUENZA A PCR 09/21/2022 Negative  Negative Final        • INFLUENZA B PCR 09/21/2022 Negative  Negative Final        • RSV PCR 09/21/2022 Negative  Negative Final        • EXTBreath Alcohol 09/21/2022 0 000   Final   • Amph/Meth UR 09/21/2022 Negative  Negative Final   • Barbiturate Ur 09/21/2022 Negative  Negative Final   • Benzodiazepine Urine 09/21/2022 Negative  Negative Final   • Cocaine Urine 09/21/2022 Negative  Negative Final   • Methadone Urine 09/21/2022 Negative  Negative Final   • Opiate Urine 09/21/2022 Negative  Negative Final   • PCP Ur 09/21/2022 Negative  Negative Final   • THC Urine 09/21/2022 Negative  Negative Final   • Oxycodone Urine 09/21/2022 Negative  Negative Final   • Color, UA 09/21/2022 Light Yellow   Final   • Clarity, UA 09/21/2022 Clear   Final   • Specific Gravity, UA 09/21/2022 1 017  1 003 - 1 030 Final   • pH, UA 09/21/2022 5 5  4 5, 5 0, 5 5, 6 0, 6 5, 7 0, 7 5, 8 0 Final   • Leukocytes, UA 09/21/2022 Negative  Negative Final   • Nitrite, UA 09/21/2022 Negative  Negative Final   • Protein, UA 09/21/2022 Negative  Negative mg/dl Final   • Glucose, UA 09/21/2022 Negative  Negative mg/dl Final   • Ketones, UA 09/21/2022 10 (1+) (A) Negative mg/dl Final   • Urobilinogen, UA 09/21/2022 <2 0  <2 0 mg/dl mg/dl Final   • Bilirubin, UA 09/21/2022 Negative  Negative Final   • Occult Blood, UA 09/21/2022 Negative  Negative Final   • hs TnI 2hr 09/21/2022 12  "Refer to ACS Flowchart"- see link ng/L Final    Comment:                                              Initial (time 0) result  If >=50 ng/L, Myocardial injury suggested ;  Type of myocardial injury and treatment strategy  to be determined  If 5-49 ng/L, a delta result at 2 hours and or 4 hours will be needed to further evaluate  If <4 ng/L, and chest pain has been >3 hours since onset, patient may qualify for discharge based on the HEART score in the ED  If <5 ng/L and <3hours since onset of chest pain, a delta result at 2 hours will be needed to further evaluate  HS Troponin 99th Percentile URL of a Health Population=12 ng/L with a 95% Confidence Interval of 8-18 ng/L  Second Troponin (time 2 hours)  If calculated delta >= 20 ng/L,  Myocardial injury suggested ; Type of myocardial injury and treatment strategy to be determined  If 5-49 ng/L and the calculated delta is 5-19 ng/L, consult medical service for evaluation  Continue evaluation for ischemia on ecg and other possible etiology and repeat hs troponin at 4 hours  If delta                            is <5 ng/L at 2 hours, consider discharge based on risk stratification via the HEART score (if in ED), or MIGUELITO risk score in IP/Observation  HS Troponin 99th Percentile URL of a Health Population=12 ng/L with a 95% Confidence Interval of 8-18 ng/L     • Delta 2hr hsTnI 09/21/2022 0  <20 ng/L Final   • Ventricular Rate 09/21/2022 70  BPM Final   • Atrial Rate 09/21/2022 70  BPM Final   • NJ Interval 09/21/2022 148  ms Final   • QRSD Interval 09/21/2022 82  ms Final   • QT Interval 09/21/2022 380  ms Final   • QTC Interval 09/21/2022 410  ms Final   • P Axis 09/21/2022 51  degrees Final   • QRS Axis 09/21/2022 16  degrees Final   • T Wave Axis 09/21/2022 -2  degrees Final   Ancillary Orders on 09/09/2022   Component Date Value Ref Range Status   • Protime 09/12/2022 24 4 (A) 11 6 - 14 5 seconds Final   • INR 09/12/2022 2 21 (A) 0 84 - 1 19 Final   Appointment on 09/02/2022   Component Date Value Ref Range Status   • TSH 3RD GENERATON 09/02/2022 0 192 (A) 0 450 - 4 500 uIU/mL Final    Adult TSH (3rd generation) reference range follows the recommended guidelines of the American Thyroid Association, January, 2020  • Free T4 09/02/2022 1 48 (A) 0 76 - 1 46 ng/dL Final    Specimen collection should occur prior to Sulfasalazine administration due to the potential for falsely elevated results  Ancillary Orders on 09/02/2022   Component Date Value Ref Range Status   • Protime 09/02/2022 27 4 (A) 11 6 - 14 5 seconds Final   • INR 09/02/2022 2 56 (A) 0 84 - 1 19 Final     ---------------------------------------    Historical Information   Information is copied from the previous visit and updated today as appropriate  Psychiatric History:   Prior psychiatric diagnoses: depression and anxiety   Inpatient hospitalizations: once 2014 when attending said he will get aortic surgery, he states that he was brought inpatient psychiatry when the hospital that diagnosed him with aortic injury had planned a surgery for several weeks later and brought him in because they thought he would not follow up/possibly hurt himself prior to surgery  Inpatient admission from 9/30-22-10/26/22 for severe anxiety, difficulty caring for self due to the anxiety  Suicide attempts/self-harm: patient denies  Violent/aggressive behavior: patient denies  Outpatient psychiatric providers: John 2015 after stroke  Past/current psychotherapy: One therapist, in past forgets name    Other Services: unknown  Psychiatric medication trial:   · Unsure of others, dissolvable, klonopin  · lorazepam and mirtazapine (still taking both)  · Sertraline while inpatient admission in 2022, developed hyponatremia and medication was discontinued     Substance Abuse History:  Patient denies use of tobacco, alcohol, or illicit drugs  I have assessed this patient for substance use within the past 12 months      Family Psychiatric History:   Anxiety and depression in 3 sister  Sister attempted to end life      Social History  Family: 3 sisters, no children  Marital history:    Children: no  Living arrangement: Lives in a home with sister  Support system: alone & isolated , but has 3 sisters  Education: some college studied photography  Learning/developmental Disabilities: none  Occupational History: Sold and delivered office supplies, then [employer], retired in 2014 because needed surgery for aortic aneurism  On disability  Legal History: none  Violence: no   History: None  Access to firearms: patient denies        Traumatic History:   Abuse: none reported  Other Traumatic Events: none reported            Assessment/Plan:   Florentin Merino is a 58 y o , , no children, lives with sister, unemployed at this time  PPH of SH[facility]ON, recently established care with Dr Jorge Walker; [facility] of anxiety; No known history of  illicit drug or alcohol use, no suicide attempt, No Hx of violence, patient has multiple cardiac and medical problems including (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, s/p aortic aneurysm, Bicuspid aortic stenosis, antiphospholipid antibody  with hypercoagulable state, PVD , paroxysmal Afib , HTN, HLD, CORIE, Lower back pain  With recent inpatient admission from 9/30-22-10/26/22 for severe anxiety, difficulty caring for self due to the anxiety, presenting to the [facility] [address] outpatient clinic [facility] for for follow-up regarding medication management    Having just been discharged from the hospital last Wednesday, patient will be continued on his current psychiatric medication regiment with a plan to adjust Abilify to nighttime dosing instead of daytime due to continued sedation  He feels better from an anxiety standpoint, although it persists to a degree due to psychosocial stressors and difficulty with ambulation which has decreased over the last month and endorses worry and concern over likelihood of his improvement  He feels continued depressive symptoms stemming from this and difficulty coping with severe anxiety for an extended period of time  He will continue working with physical therapy moving forward and his sister is at home to support him in terms of appointments and medication compliance  Over the weekend, since being discharged, he reports that he is going in the right direction in terms of slowly improving mobility although it is very difficult and taxing for him  He denies any pain, but does state that his anxiety is slightly improved over the weekend  He agrees to follow-up in 1 month and if he feels he needs to move the appointment earlier, he will let his sister know who will make a phone call to the psychiatric offices  At this point, he would not like therapy but at a later date he will consider once mobility has improved  DSM-5 Diagnosis:  1  Generalized anxiety disorder - not at goal  2  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 attack - not at goal  3  Depression, unspecified - not at goal    Treatment Recommendations/Precautions:  • Continue mirtazapine 45 mg qHS for anxiety, depression, insomnia  • Adjust Abilify 5 mg qHS as adjunct treatment and mood stability; consider decreasing/tapering at later date if continued daytime sedation  • Continue melatonin 5 mg q h s  for sleep aid   • Continue Ativan 0 25 mg daily at bedtime for sleep aid (being prescribed by outpatient provider)  • Medical: Follow up with primary care physician for ongoing medical care    Continue with regular physical therapy appointments at home, next appointment scheduled for this coming Wednesday 11/2  • Medication management every 4 weeks  • Aware of 24 hour and weekend coverage for urgent situations accessed by calling [facility] main practice number  • Medical:  Follow up with primary care physician for ongoing medical care       Controlled Medication Discussion:   Ange Bautista has been filling controlled prescriptions on time as prescribed according to [facility] Prescription Drug Monitoring Program    Medical Decision Making / Counseling / Coordination of Care: The following interventions are recommended: return in 1 month for follow up  Although patient's acute lethality risk is LOW, long-term/chronic lethality risk is mildly elevated given the risk factors listed above  However, at the current moment, Ange Bautista is future-oriented, forward-thinking, and demonstrates ability to act in a self-preserving manner as evidenced by volitionally seeking psychiatric evaluation and treatment today  To mitigate future risk, patient should adhere to treatment recommendations, avoid alcohol/illicit substance use, utilize community-based resources and familiar support, and prioritize mental health treatment  The importance of medication and treatment compliance was reviewed with the patient  Individual supportive psychotherapy was provided     The diagnosis and treatment plan were reviewed with the patient  Risks, benefits, and alternatives to treatment were discussed:  • PARQ was completed for mirtazapine (Remeron) including sedation, weight gain, suicidal thoughts in patients under 22years old, and rare hyponatremia or agranulocytosis  • PARQ was completed for second generation antipsychotic medication including sedation, GI distress, dizziness, risk of metabolic syndrome, EPS (akathisia, TD, etc), rare NMS, orthostatic hypotension, cardiovascular risks such as QT prolongation, increased prolactin, and others    • Risks of taking benzodiazepines were discussed, including risk of sedation, respiratory depression, and impairment in judgment and motor function, particularly when combined with alcohol, opioids, or other central nervous system-depressant medications/substances  Depression, mood changes, GI distress, addiction potential, withdrawal seizures with abrupt discontinuation, and other potential adverse effects were also discussed  The patient was instructed not to drive or operate machinery while taking benzodiazepines   Pregnancy risks, including risk of fetal cleft palate, were also discussed with patients of childbearing potential       This note was not shared with the patient due to reasonable likelihood of causing patient harm     Yoselyn Vogt MD

## 2022-11-03 ENCOUNTER — APPOINTMENT (EMERGENCY)
Dept: RADIOLOGY | Facility: HOSPITAL | Age: 62
End: 2022-11-03

## 2022-11-03 ENCOUNTER — HOSPITAL ENCOUNTER (INPATIENT)
Facility: HOSPITAL | Age: 62
LOS: 36 days | Discharge: NURSING, REHAB OR LONG-TERM CARE | End: 2022-12-09
Attending: EMERGENCY MEDICINE | Admitting: INTERNAL MEDICINE

## 2022-11-03 ENCOUNTER — TELEPHONE (OUTPATIENT)
Dept: PSYCHIATRY | Facility: CLINIC | Age: 62
End: 2022-11-03

## 2022-11-03 DIAGNOSIS — E87.0 HYPERNATREMIA: ICD-10-CM

## 2022-11-03 DIAGNOSIS — R25.1 TREMOR OF RIGHT HAND: ICD-10-CM

## 2022-11-03 DIAGNOSIS — R53.1 GENERALIZED WEAKNESS: ICD-10-CM

## 2022-11-03 DIAGNOSIS — N17.9 AKI (ACUTE KIDNEY INJURY) (HCC): ICD-10-CM

## 2022-11-03 DIAGNOSIS — M62.82 RHABDOMYOLYSIS: Primary | ICD-10-CM

## 2022-11-03 DIAGNOSIS — R19.7 INTRACTABLE DIARRHEA: ICD-10-CM

## 2022-11-03 DIAGNOSIS — D68.61 ANTIPHOSPHOLIPID ANTIBODY WITH HYPERCOAGULABLE STATE (HCC): ICD-10-CM

## 2022-11-03 DIAGNOSIS — E87.8 ELECTROLYTE ABNORMALITY: ICD-10-CM

## 2022-11-03 DIAGNOSIS — I48.0 PAROXYSMAL ATRIAL FIBRILLATION (HCC): ICD-10-CM

## 2022-11-03 DIAGNOSIS — F41.0 ANXIETY DISORDER DUE TO GENERAL MEDICAL CONDITION WITH PANIC ATTACK: ICD-10-CM

## 2022-11-03 DIAGNOSIS — G93.89 ENCEPHALOMALACIA: ICD-10-CM

## 2022-11-03 DIAGNOSIS — F41.1 GAD (GENERALIZED ANXIETY DISORDER): ICD-10-CM

## 2022-11-03 DIAGNOSIS — E87.6 HYPOKALEMIA: ICD-10-CM

## 2022-11-03 DIAGNOSIS — R19.7 DIARRHEA, UNSPECIFIED TYPE: ICD-10-CM

## 2022-11-03 DIAGNOSIS — R63.8 DECREASED ORAL INTAKE: ICD-10-CM

## 2022-11-03 DIAGNOSIS — F06.4 ANXIETY DISORDER DUE TO GENERAL MEDICAL CONDITION WITH PANIC ATTACK: ICD-10-CM

## 2022-11-03 DIAGNOSIS — R26.2 AMBULATORY DYSFUNCTION: ICD-10-CM

## 2022-11-03 DIAGNOSIS — Z79.01 ANTICOAGULATED ON COUMADIN: ICD-10-CM

## 2022-11-03 DIAGNOSIS — R19.7 OVERFLOW DIARRHEA: ICD-10-CM

## 2022-11-03 DIAGNOSIS — F41.9 ANXIETY: ICD-10-CM

## 2022-11-03 DIAGNOSIS — J34.89 NASAL DRYNESS: ICD-10-CM

## 2022-11-03 DIAGNOSIS — R79.1 SUPRATHERAPEUTIC INR: ICD-10-CM

## 2022-11-03 DIAGNOSIS — Z78.9 IMPAIRED DECISION MAKING: ICD-10-CM

## 2022-11-03 DIAGNOSIS — R29.898 WEAKNESS OF BOTH LOWER EXTREMITIES: ICD-10-CM

## 2022-11-03 DIAGNOSIS — E05.90 SUBCLINICAL HYPERTHYROIDISM: ICD-10-CM

## 2022-11-03 LAB
2HR DELTA HS TROPONIN: -18 NG/L
4HR DELTA HS TROPONIN: -11 NG/L
ALBUMIN SERPL BCP-MCNC: 3.7 G/DL (ref 3.5–5)
ALP SERPL-CCNC: 119 U/L (ref 46–116)
ALT SERPL W P-5'-P-CCNC: 85 U/L (ref 12–78)
AMMONIA PLAS-SCNC: <10 UMOL/L (ref 11–35)
ANION GAP SERPL CALCULATED.3IONS-SCNC: 10 MMOL/L (ref 4–13)
ANION GAP SERPL CALCULATED.3IONS-SCNC: 6 MMOL/L (ref 4–13)
AST SERPL W P-5'-P-CCNC: 333 U/L (ref 5–45)
ATRIAL RATE: 94 BPM
BASOPHILS # BLD AUTO: 0.02 THOUSANDS/ÂΜL (ref 0–0.1)
BASOPHILS NFR BLD AUTO: 0 % (ref 0–1)
BILIRUB SERPL-MCNC: 1.96 MG/DL (ref 0.2–1)
BUN SERPL-MCNC: 73 MG/DL (ref 5–25)
BUN SERPL-MCNC: 77 MG/DL (ref 5–25)
CALCIUM SERPL-MCNC: 10.2 MG/DL (ref 8.3–10.1)
CALCIUM SERPL-MCNC: 8.9 MG/DL (ref 8.3–10.1)
CARDIAC TROPONIN I PNL SERPL HS: 70 NG/L
CARDIAC TROPONIN I PNL SERPL HS: 77 NG/L
CARDIAC TROPONIN I PNL SERPL HS: 88 NG/L
CHLORIDE SERPL-SCNC: 109 MMOL/L (ref 96–108)
CHLORIDE SERPL-SCNC: 117 MMOL/L (ref 96–108)
CK MB SERPL-MCNC: 35.3 NG/ML (ref 0–5)
CK MB SERPL-MCNC: <1 % (ref 0–2.5)
CK SERPL-CCNC: 7247 U/L (ref 39–308)
CO2 SERPL-SCNC: 28 MMOL/L (ref 21–32)
CO2 SERPL-SCNC: 30 MMOL/L (ref 21–32)
CREAT SERPL-MCNC: 2.23 MG/DL (ref 0.6–1.3)
CREAT SERPL-MCNC: 2.6 MG/DL (ref 0.6–1.3)
EOSINOPHIL # BLD AUTO: 0.01 THOUSAND/ÂΜL (ref 0–0.61)
EOSINOPHIL NFR BLD AUTO: 0 % (ref 0–6)
ERYTHROCYTE [DISTWIDTH] IN BLOOD BY AUTOMATED COUNT: 14.6 % (ref 11.6–15.1)
GFR SERPL CREATININE-BSD FRML MDRD: 25 ML/MIN/1.73SQ M
GFR SERPL CREATININE-BSD FRML MDRD: 30 ML/MIN/1.73SQ M
GLUCOSE SERPL-MCNC: 102 MG/DL (ref 65–140)
GLUCOSE SERPL-MCNC: 151 MG/DL (ref 65–140)
HCT VFR BLD AUTO: 53.6 % (ref 36.5–49.3)
HGB BLD-MCNC: 17.5 G/DL (ref 12–17)
IMM GRANULOCYTES # BLD AUTO: 0.08 THOUSAND/UL (ref 0–0.2)
IMM GRANULOCYTES NFR BLD AUTO: 1 % (ref 0–2)
INR PPP: 6.02 (ref 0.84–1.19)
LYMPHOCYTES # BLD AUTO: 0.89 THOUSANDS/ÂΜL (ref 0.6–4.47)
LYMPHOCYTES NFR BLD AUTO: 6 % (ref 14–44)
MAGNESIUM SERPL-MCNC: 3 MG/DL (ref 1.6–2.6)
MCH RBC QN AUTO: 30.9 PG (ref 26.8–34.3)
MCHC RBC AUTO-ENTMCNC: 32.6 G/DL (ref 31.4–37.4)
MCV RBC AUTO: 95 FL (ref 82–98)
MONOCYTES # BLD AUTO: 0.92 THOUSAND/ÂΜL (ref 0.17–1.22)
MONOCYTES NFR BLD AUTO: 6 % (ref 4–12)
NEUTROPHILS # BLD AUTO: 12.99 THOUSANDS/ÂΜL (ref 1.85–7.62)
NEUTS SEG NFR BLD AUTO: 87 % (ref 43–75)
NRBC BLD AUTO-RTO: 0 /100 WBCS
P AXIS: 109 DEGREES
PLATELET # BLD AUTO: 258 THOUSANDS/UL (ref 149–390)
PMV BLD AUTO: 11.9 FL (ref 8.9–12.7)
POTASSIUM SERPL-SCNC: 2.8 MMOL/L (ref 3.5–5.3)
POTASSIUM SERPL-SCNC: 3.1 MMOL/L (ref 3.5–5.3)
PR INTERVAL: 150 MS
PROT SERPL-MCNC: 8.5 G/DL (ref 6.4–8.4)
PROTHROMBIN TIME: 53.9 SECONDS (ref 11.6–14.5)
QRS AXIS: 38 DEGREES
QRSD INTERVAL: 78 MS
QT INTERVAL: 314 MS
QTC INTERVAL: 402 MS
RBC # BLD AUTO: 5.67 MILLION/UL (ref 3.88–5.62)
SODIUM SERPL-SCNC: 149 MMOL/L (ref 135–147)
SODIUM SERPL-SCNC: 151 MMOL/L (ref 135–147)
T WAVE AXIS: 234 DEGREES
VENTRICULAR RATE: 99 BPM
WBC # BLD AUTO: 14.91 THOUSAND/UL (ref 4.31–10.16)

## 2022-11-03 RX ORDER — ARIPIPRAZOLE 5 MG/1
5 TABLET ORAL
Status: DISCONTINUED | OUTPATIENT
Start: 2022-11-03 | End: 2022-11-04

## 2022-11-03 RX ORDER — PANTOPRAZOLE SODIUM 40 MG/1
40 TABLET, DELAYED RELEASE ORAL 2 TIMES DAILY
Status: DISCONTINUED | OUTPATIENT
Start: 2022-11-03 | End: 2022-11-11

## 2022-11-03 RX ORDER — POTASSIUM CHLORIDE 14.9 MG/ML
20 INJECTION INTRAVENOUS
Status: COMPLETED | OUTPATIENT
Start: 2022-11-03 | End: 2022-11-03

## 2022-11-03 RX ORDER — SODIUM CHLORIDE, SODIUM GLUCONATE, SODIUM ACETATE, POTASSIUM CHLORIDE, MAGNESIUM CHLORIDE, SODIUM PHOSPHATE, DIBASIC, AND POTASSIUM PHOSPHATE .53; .5; .37; .037; .03; .012; .00082 G/100ML; G/100ML; G/100ML; G/100ML; G/100ML; G/100ML; G/100ML
1000 INJECTION, SOLUTION INTRAVENOUS ONCE
Status: COMPLETED | OUTPATIENT
Start: 2022-11-03 | End: 2022-11-03

## 2022-11-03 RX ADMIN — POTASSIUM CHLORIDE 20 MEQ: 14.9 INJECTION, SOLUTION INTRAVENOUS at 18:19

## 2022-11-03 RX ADMIN — POTASSIUM CHLORIDE 20 MEQ: 14.9 INJECTION, SOLUTION INTRAVENOUS at 20:12

## 2022-11-03 RX ADMIN — SODIUM CHLORIDE 1000 ML: 0.9 INJECTION, SOLUTION INTRAVENOUS at 16:25

## 2022-11-03 RX ADMIN — SODIUM CHLORIDE, SODIUM GLUCONATE, SODIUM ACETATE, POTASSIUM CHLORIDE AND MAGNESIUM CHLORIDE 1000 ML: 526; 502; 368; 37; 30 INJECTION, SOLUTION INTRAVENOUS at 18:19

## 2022-11-03 RX ADMIN — SODIUM CHLORIDE, SODIUM LACTATE, POTASSIUM CHLORIDE, AND CALCIUM CHLORIDE 1000 ML: .6; .31; .03; .02 INJECTION, SOLUTION INTRAVENOUS at 22:16

## 2022-11-03 NOTE — ED PROVIDER NOTES
History  Chief Complaint   Patient presents with   • Altered Mental Status     Per EMS  "Recently admitted to [facility], and then discharged home  Not med compliant and not eating well  Stated that he feels confused "  Pt states that he feels fuzzy  Confirms that he has not been eating or drinking  57 y/o male with hx of HTN, HLD, anxiety, depression, ascending aortic aneurysm s/p repair (2015) LUE tremor, and prior stroke with residual left sided weakness presents to the ED via EMS for evaluation of confusion and generalized weakness  Patient states that he "cannot move (his) legs properly " History is somewhat limited as the patient is slow to respond to questions and when answering he  only speaks softly and with only a few words at a time  He is on Coumadin daily and states that last took Coumadin yesterday  He was recently admitted for 3-4 weeks at [facility] for severe generalized anxiety and depression  I discussed with the patient's sister, who has been staying with the patient since he was discharged from inpatient psychiatric care at [facility] on 10/26/2022 (8 days ago); she reports that he was acting appropriately for the first few days after arriving home, however over the last 4-5 days he has had gradually worsening appetite, decreased oral intake, and has been overall less active  When asked if he is experiencing any suicidal or homicidal ideation, the patient is slow to answer but eventually answers no  Prior to Admission Medications   Prescriptions Last Dose Informant Patient Reported? Taking? ARIPiprazole (ABILIFY) 5 mg tablet   No No   Sig: Take 1 tablet (5 mg total) by mouth daily at bedtime   LORazepam (ATIVAN) 0 5 mg tablet   No No   Sig: Take 0 5 tablets (0 25 mg total) by mouth daily at bedtime for 10 days   amLODIPine (NORVASC) 5 mg tablet   No No   Sig: Take 1 tablet (5 mg total) by mouth daily Do not start before October 27, 2022     atorvastatin (LIPITOR) 40 mg tablet  Self No No   Sig: Take 1 tablet (40 mg total) by mouth daily with dinner   carvedilol (COREG) 25 mg tablet   No No   Sig: Take 1 5 tablets (37 5 mg total) by mouth 2 (two) times a day with meals   docusate sodium (COLACE) 100 mg capsule   No No   Sig: Take 1 capsule (100 mg total) by mouth daily at bedtime   famotidine (PEPCID) 40 MG tablet  Self No No   Sig: Take 1 tablet (40 mg total) by mouth 2 (two) times a day   melatonin 5 MG TABS   No No   Sig: Take 1 tablet (5 mg total) by mouth daily at bedtime   mirtazapine (REMERON) 45 MG tablet   No No   Sig: Take 1 tablet (45 mg total) by mouth daily at bedtime   pantoprazole (PROTONIX) 40 mg tablet   No No   Sig: Take 1 tablet (40 mg total) by mouth 2 (two) times a day   torsemide (DEMADEX) 10 mg tablet   No No   Sig: Take 1 tablet (10 mg total) by mouth daily Do not start before October 27, 2022     warfarin (COUMADIN) 4 mg tablet   No No   Sig: TAKE 1 TABLET BY MOUTH EVERY DAY      Facility-Administered Medications: None       Past Medical History:   Diagnosis Date   • Aneurysm of thoracic aorta     last assessed 11/20/17   • Anxiety     currently on no meds   • Arthritis    • Bilateral inguinal hernia    • Cardiac disease    • Cognitive impairment    • CVA (cerebral vascular accident) ([facility] )    • Depression    • GERD (gastroesophageal reflux disease)    • Hearing loss of aging    • Heart disease    • Hyperlipidemia    • Hypertension    • Irritable bowel syndrome    • Kidney stone    • Obesity    • Occasional tremors     left arm since stroke   • Palpitations    • Panic attack    • PONV (postoperative nausea and vomiting)     and headache x 3 days   • Psychiatric illness    • Sciatica     right and left  side   • Shortness of breath     on exertion   • Sleep apnea     is not using a CPAP   • Sleep difficulties    • Spitting up blood 05/21/2021    Resolved   • Status post placement of implantable loop recorder    • Stroke ([facility] ) 11/2014   • Stroke ([facility] ) 03/2021   • TIA (transient ischemic attack)        Past Surgical History:   Procedure Laterality Date   • AORTA SURGERY      thoracic - aneurysmorrhaphy   • APPENDECTOMY     • ASCENDING AORTIC ANEURYSM REPAIR      resolved 8/19/15   • CARDIAC LOOP RECORDER     • CHOLECYSTECTOMY      laparoscopic   • COLONOSCOPY     • CYSTOSCOPY      with removal of object- stent removal   • KNEE ARTHROSCOPY Right 1996    with medial meniscus repair   • LITHOTRIPSY      renal   • ORTHOPEDIC SURGERY     • VT FRAGMENT KIDNEY STONE/ ESWL Left 5/17/2018    Procedure: LITHROTRIPSY EXTRACORPORAL SHOCKWAVE (ESWL); Surgeon: Edgard Mei MD;  Location: [facility];  Service: Urology   • VT LAP,INGUINAL HERNIA REPR,INITIAL Bilateral 12/9/2021    Procedure: ROBOTIC REPAIR HERNIA INGUINAL;  Surgeon: Hayde Lemus MD;  Location: [facility];  Service: General   • THUMB ARTHROSCOPY Right 1976    ligament repair   • UPPER GASTROINTESTINAL ENDOSCOPY         Family History   Problem Relation Age of Onset   • Diverticulitis Mother         of colon   • Nephrolithiasis Mother    • Emphysema Father    • Nephrolithiasis Sister    • Heart attack Maternal Grandfather 72   • Breast cancer Paternal Grandmother    • Lung cancer Paternal Grandfather    • Cancer Family    • Coronary artery disease Family    • Diabetes Family         sibling   • Hypertension Family         sibling   • Hernia Family         sibling     I have reviewed and agree with the history as documented      E-Cigarette/Vaping   • E-Cigarette Use Never User      E-Cigarette/Vaping Substances   • Nicotine No    • THC No    • CBD No    • Flavoring No    • Other No    • Unknown No      Social History     Tobacco Use   • Smoking status: Never Smoker   • Smokeless tobacco: Never Used   • Tobacco comment: no second hand smoke exposure   Vaping Use   • Vaping Use: Never used   Substance Use Topics   • Alcohol use: Not Currently     Comment: very rare in past   • Drug use: Never        Review of Systems   Constitutional: Positive for appetite change  Negative for chills and fever  HENT: Negative for congestion, rhinorrhea and sore throat  Respiratory: Negative for cough and shortness of breath  Cardiovascular: Negative for chest pain and palpitations  Gastrointestinal: Negative for abdominal pain, diarrhea, nausea and vomiting  Genitourinary: Negative for dysuria and hematuria  Musculoskeletal: Negative for back pain and neck pain  Neurological: Positive for tremors, weakness and light-headedness  Negative for seizures, syncope, numbness and headaches  All other systems reviewed and are negative  Physical Exam  ED Triage Vitals   Temperature Pulse Respirations Blood Pressure SpO2   11/03/22 1541 11/03/22 1540 11/03/22 1540 11/03/22 1540 11/03/22 1540   (!) 97 3 °F (36 3 °C) 97 16 123/81 95 %      Temp Source Heart Rate Source Patient Position - Orthostatic VS BP Location FiO2 (%)   11/03/22 1541 11/03/22 1615 11/03/22 1900 11/03/22 1900 --   Oral Monitor Lying Right arm       Pain Score       --                    Orthostatic Vital Signs  Vitals:    11/03/22 2000 11/03/22 2030 11/03/22 2100 11/03/22 2130   BP: 126/74 143/86 135/79 115/71   Pulse: 98 102 104 100   Patient Position - Orthostatic VS: Lying Lying Lying Lying       Physical Exam  Vitals and nursing note reviewed  Constitutional:       General: He is not in acute distress  Appearance: Normal appearance  He is well-developed and normal weight  HENT:      Head: Normocephalic and atraumatic  Right Ear: External ear normal       Left Ear: External ear normal       Nose: Nose normal       Mouth/Throat:      Mouth: Mucous membranes are moist       Pharynx: Oropharynx is clear  No oropharyngeal exudate or posterior oropharyngeal erythema  Eyes:      Extraocular Movements: Extraocular movements intact  Conjunctiva/sclera: Conjunctivae normal       Pupils: Pupils are equal, round, and reactive to light  Cardiovascular:      Rate and Rhythm: Normal rate and regular rhythm  Pulses: Normal pulses  Heart sounds: Normal heart sounds  Pulmonary:      Effort: Pulmonary effort is normal  No respiratory distress  Breath sounds: Normal breath sounds  No wheezing or rales  Abdominal:      General: Abdomen is flat  Bowel sounds are normal  There is no distension  Palpations: Abdomen is soft  Tenderness: There is no abdominal tenderness  There is no right CVA tenderness, left CVA tenderness or guarding  Musculoskeletal:         General: No swelling or tenderness  Normal range of motion  Cervical back: Normal range of motion and neck supple  No tenderness  Skin:     General: Skin is warm and dry  Neurological:      General: No focal deficit present  Mental Status: He is alert and oriented to person, place, and time  GCS: GCS eye subscore is 4  GCS verbal subscore is 5  GCS motor subscore is 6  Cranial Nerves: No cranial nerve deficit, dysarthria or facial asymmetry  Sensory: No sensory deficit  Comments: Left upper extremity tremor noted, at baseline  Patient states that he can not move his legs but is able to move them on exam   Motor grossly intact but poor effort, 2/5 bilateral LE  Sensation grossly intact and symmetric  Psychiatric:      Comments: Withdrawn and depressed affect  No SI or HI  No hallucinations           ED Medications  Medications   potassium chloride 20 mEq IVPB (premix) ( Intravenous Restarted 11/3/22 2013)   lactated ringers bolus 1,000 mL (has no administration in time range)   sodium chloride 0 9 % bolus 1,000 mL (0 mL Intravenous Stopped 11/3/22 1815)   multi-electrolyte (ISOLYTE-S PH 7 4) bolus 1,000 mL (1,000 mL Intravenous New Bag 11/3/22 1819)       Diagnostic Studies  Results Reviewed     Procedure Component Value Units Date/Time    Basic metabolic panel [[id number]]     Lab Status: No result Specimen: Blood     HS Troponin I 4hr [[id number]]  (Abnormal) Collected: 11/03/22 2012    Lab Status: Final result Specimen: Blood from Arm, Left Updated: 11/03/22 2049     hs TnI 4hr 77 ng/L      Delta 4hr hsTnI -11 ng/L     HS Troponin I 2hr [[id number]]  (Abnormal) Collected: 11/03/22 1816    Lab Status: Final result Specimen: Blood from Arm, Left Updated: 11/03/22 1851     hs TnI 2hr 70 ng/L      Delta 2hr hsTnI -18 ng/L     Protime-INR [[id number]]  (Abnormal) Collected: 11/03/22 1621    Lab Status: Final result Specimen: Blood from Arm, Left Updated: 11/03/22 1758     Protime 53 9 seconds      INR 6 02    CKMB [[id number]]  (Abnormal) Collected: 11/03/22 1621    Lab Status: Final result Specimen: Blood from Arm, Left Updated: 11/03/22 1749     CK-MB Index <1 0 %      CK-MB 35 3 ng/mL     Comprehensive metabolic panel [[id number]]  (Abnormal) Collected: 11/03/22 1621    Lab Status: Final result Specimen: Blood from Arm, Left Updated: 11/03/22 1733     Sodium 149 mmol/L      Potassium 2 8 mmol/L      Chloride 109 mmol/L      CO2 30 mmol/L      ANION GAP 10 mmol/L      BUN 77 mg/dL      Creatinine 2 60 mg/dL      Glucose 102 mg/dL      Calcium 10 2 mg/dL       U/L      ALT 85 U/L      Alkaline Phosphatase 119 U/L      Total Protein 8 5 g/dL      Albumin 3 7 g/dL      Total Bilirubin 1 96 mg/dL      eGFR 25 ml/min/1 73sq m     Narrative:      [facility] guidelines for Chronic Kidney Disease (CKD):   •  Stage 1 with normal or high GFR (GFR > 90 mL/min/1 73 square meters)  •  Stage 2 Mild CKD (GFR = 60-89 mL/min/1 73 square meters)  •  Stage 3A Moderate CKD (GFR = 45-59 mL/min/1 73 square meters)  •  Stage 3B Moderate CKD (GFR = 30-44 mL/min/1 73 square meters)  •  Stage 4 Severe CKD (GFR = 15-29 mL/min/1 73 square meters)  •  Stage 5 End Stage CKD (GFR <15 mL/min/1 73 square meters)  Note: GFR calculation is accurate only with a steady state creatinine    CK (with reflex to MB) [[id number]]  (Abnormal) Collected: 11/03/22 1621    Lab Status: Final result Specimen: Blood from Arm, Left Updated: 11/03/22 1733     Total CK 7,247 U/L     HS Troponin 0hr (reflex protocol) [[id number]]  (Abnormal) Collected: 11/03/22 1621    Lab Status: Final result Specimen: Blood from Arm, Left Updated: 11/03/22 1711     hs TnI 0hr 88 ng/L     Magnesium [[id number]]  (Abnormal) Collected: 11/03/22 1621    Lab Status: Final result Specimen: Blood from Arm, Left Updated: 11/03/22 1704     Magnesium 3 0 mg/dL     Ammonia [[id number]]  (Abnormal) Collected: 11/03/22 1621    Lab Status: Final result Specimen: Blood from Arm, Left Updated: 11/03/22 1702     Ammonia <10 umol/L     CBC and differential [[id number]]  (Abnormal) Collected: 11/03/22 1621    Lab Status: Final result Specimen: Blood from Arm, Left Updated: 11/03/22 1643     WBC 14 91 Thousand/uL      RBC 5 67 Million/uL      Hemoglobin 17 5 g/dL      Hematocrit 53 6 %      MCV 95 fL      MCH 30 9 pg      MCHC 32 6 g/dL      RDW 14 6 %      MPV 11 9 fL      Platelets 539 Thousands/uL      nRBC 0 /100 WBCs      Neutrophils Relative 87 %      Immat GRANS % 1 %      Lymphocytes Relative 6 %      Monocytes Relative 6 %      Eosinophils Relative 0 %      Basophils Relative 0 %      Neutrophils Absolute 12 99 Thousands/µL      Immature Grans Absolute 0 08 Thousand/uL      Lymphocytes Absolute 0 89 Thousands/µL      Monocytes Absolute 0 92 Thousand/µL      Eosinophils Absolute 0 01 Thousand/µL      Basophils Absolute 0 02 Thousands/µL                  CT head without contrast   Final Result by Chasity Tenorio MD (11/03 1930)      No acute intracranial abnormality  Encephalomalacia involving the right occipital lobe and bilateral cerebelli  Grossly stable appearance of periventricular hypoattenuation compared to MRI brain 8/29/2022, likely representing chronic microvascular    ischemic changes  The study was marked in [facility] for immediate notification              Workstation performed: [id number] XR chest 1 view portable   ED Interpretation by Matt Vallejo MD (11/03 1902)   Status post sternotomy  Loop recorder in place  No acute cardiopulmonary disease process on my interpretation  Procedures  Procedures      ED Course  ED Course as of 11/03/22 2200   Thu Nov 03, 2022   1740 Sodium(!): 149   1740 Potassium(!): 2 8   1740 Chloride(!): 109   1740 Creatinine(!): 2 60  New   1740 Calcium(!): 10 2   1740 AST(!): 333   1740 ALT(!): 85   1740 Alkaline Phosphatase(!): 119   1740 Total CK(!): 7,247   1740 hs TnI 0hr(!): 88   1740 WBC(!): 14 91   1740 Red Blood Cell Count(!): 5 67   1740 Hemoglobin(!): 17 5   1740 HCT(!): 53 6   1740 Platelet Count: 311   1660 Patient appears to be in rhabdomyolysis secondary to dehydration and decreased oral intake  Also hypokalemic  Will repeat potassium and continue IV fluids with additional fluid bolus  200 I discussed via phone call with the patient's sister, Nuvia Arzola, who has been staying with the patient since his discharge from [address] [place] [address] on 10/26/22 (8 days ago) after mental health treatment  She provides additional history that the patient initially appeared to do well after being discharged home, however starting approximately 4-5 days ago he had markedly decreased intake of food and fluids, seemed more generally weak, and was overall less active  He did not verbalize any thoughts of depression or self harm to his sister  1810 POCT INR(!!): 6 02  No signs of active bleeding  No need for correction at this time  Likely secondary to reduced oral intake  CT head pending  [place] Anuj 2hr hsTnI: -18   1950 CT head without contrast  "No acute intracranial abnormality  Encephalomalacia involving the right occipital lobe and bilateral cerebelli  Grossly stable appearance of periventricular hypoattenuation compared to MRI brain 8/29/2022, likely representing chronic microvascular   ischemic changes  "                             SBIRT 22yo+ Flowsheet Row Most Recent Value   SBIRT (23 yo +)    In order to provide better care to our patients, we are screening all of our patients for alcohol and drug use  Would it be okay to ask you these screening questions? Unable to answer at this time Filed at: 11/03/2022 2013                MDM  Number of Diagnoses or Management Options  DAYAN (acute kidney injury) [facility])  Ambulatory dysfunction  Anxiety  Decreased oral intake  Encephalomalacia  Generalized weakness  Hypokalemia  Rhabdomyolysis  Diagnosis management comments: This is a 57 y/o male with hx of HTN, HLD, anxiety, depression, ascending aortic aneurysm s/p repair (2015) LUE tremor, and prior stroke with residual left sided weakness presenting via EMS for evaluation of confusion and generalized weakness  Patient states that he "cannot move (his) legs properly " History is somewhat limited as the patient is slow to respond to questions and when answering he  only speaks softly and with only a few words at a time  He is on Coumadin daily and states that last took Coumadin yesterday  He was recently admitted for 3-4 weeks at [facility] for severe generalized anxiety and depression  I discussed with the patient's sister, who has been staying with the patient since he was discharged from inpatient psychiatric care at [facility] on 10/26/2022 (8 days ago); she reports that he was acting appropriately for the first few days after arriving home, however over the last 4-5 days he has had gradually worsening appetite, decreased oral intake, and has been overall less active  When asked if he is experiencing any suicidal or homicidal ideation, the patient is slow to answer but eventually answers no  Left upper extremity tremor noted, at baseline  Patient states that he can not move his legs but is able to move them on exam   Motor grossly intact but poor effort, 2/5 bilateral LE  Sensation grossly intact and symmetric    Will check labs include CBC, CMP, CK, troponin, and CT head  CT imaging shows encephalomalacia unchanged from prior, no other acute abnormality  DAYAN on labs  CK elevated, concern for rhabdomyolysis  IV fluids ordered  Hypokalemia noted as well, repletion ordered  Case discussed with [facility] for admission  Disposition  Final diagnoses:   Rhabdomyolysis   Decreased oral intake   Anxiety   Encephalomalacia   Ambulatory dysfunction   Generalized weakness   DAYAN (acute kidney injury) ([facility] )   Hypokalemia     Time reflects when diagnosis was documented in both MDM as applicable and the Disposition within this note     Time User Action Codes Description Comment    11/3/2022  9:58 PM Wally Held Add [M62 82] Rhabdomyolysis     11/3/2022  9:58 PM Wally Held Add [R63 8] Decreased oral intake     11/3/2022  9:58 PM Wally Held Add [F41 9] Anxiety     11/3/2022  9:59 PM Wally Held Add [G93 89] Encephalomalacia     11/3/2022  9:59 PM Wally Held Add [R26 2] Ambulatory dysfunction     11/3/2022  9:59 PM Wally Held Add [R53 1] Generalized weakness     11/3/2022  9:59 PM Wally Held Add [N17 9] DAYAN (acute kidney injury) ([facility] )     11/3/2022 10:00 PM Wally Held Add [E87 6] Hypokalemia       ED Disposition     ED Disposition   Admit    Condition   Stable    Date/Time   u Nov 3, 2022  9:58 PM    Comment   Case was discussed with Dr Olivier Mcdonald, and the patient's admission status was agreed to be Admission Status: inpatient status to the service of Dr Olivier Mcdonald  Follow-up Information    None         Patient's Medications   Discharge Prescriptions    No medications on file     No discharge procedures on file  PDMP Review       Value Time User    PDMP Reviewed  Yes 10/31/2022  9:14 AM Kalee Barajas MD           ED Provider  Attending physically available and evaluated Venice Tracee  CHASE managed the patient along with the ED Attending      Electronically Signed by         Cameron Avendaño MD  11/12/22 0771

## 2022-11-03 NOTE — TELEPHONE ENCOUNTER
Frances Spatz from [facility] called and lm for Dr Destiny Carney  She reports Katie Do continues to have side effects such as weakness, worsening tremors, gait disturbance and the sister reports "zombie- like "     She reports speaking to regional provider Dr Jorge Lynn and he recommended discontinuing the Abilify and following up with Dr Miriam Hartman       Any questions call Jordan Mackey, [address] @ [phone]

## 2022-11-03 NOTE — ED ATTENDING ATTESTATION
Final Diagnosis:  1  Rhabdomyolysis    2  Decreased oral intake    3  Anxiety    4  Encephalomalacia    5  Ambulatory dysfunction    6  Generalized weakness    7  DAYAN (acute kidney injury) ([facility] )    8  Hypokalemia    9  Hypernatremia    10  Supratherapeutic INR    11  Antiphospholipid antibody with hypercoagulable state ([facility] [address] )    12  SHIMON (generalized anxiety disorder)      ED Course as of 11/09/22 0821   Thu Nov 03, 2022   1809 POCT INR(!!): 6 02  Hmm  Not bleeding  Likely b/c poor nutrition --> albumin lower --> less protein bound  No need for correction yet though  Matthew Chirinos MD, saw and evaluated the patient  All available labs and X-rays were ordered by me or the resident and have been reviewed by myself  I discussed the patient with the resident / non-physician and agree with the resident's / non-physician practitioner's findings and plan as documented in the resident's / non-physician practicitioner's note, except where noted  At this point, I agree with the current assessment done in the ED  I was present during key portions of all procedures performed unless otherwise stated  Chief Complaint   Patient presents with   • Altered Mental Status     Per EMS  "Recently admitted to [facility], and then discharged home  Not med compliant and not eating well  Stated that he feels confused "  Pt states that he feels fuzzy  Confirms that he has not been eating or drinking  This is a 58 y o  male presenting for evaluation of "confusion"  Patient cannot move his legs properly  Per outpatient psychiatry note, the patient had "weakness, worsening tremors, gait disturbance, and zombie like appearance " It might have thought it was due to Abilify? He's been trying to take all of his medications that are prescribed  Resting tremor of the LEFT upper extremity noted, exacerbated by questioning  WOn't answer most questions  When asked about SI ? states no eventually    Denies HI ? but requires repeated questioning  Hx of stroke (B/L), hx of thoracic aneurysm  Takes coumadin, last dose was yesterday  Has been having trouble swallowing "recently", says today but can't expound on it except "not having enough fluids"    PMH:   has a past medical history of Aneurysm of thoracic aorta, Anxiety, Arthritis, Bilateral inguinal hernia, Cardiac disease, Cognitive impairment, [facility] Depression, GERD (gastroesophageal reflux disease), Hearing loss of aging, Heart disease, Hyperlipidemia, Hypertension, Irritable bowel syndrome, Kidney stone, Obesity, Occasional tremors, Palpitations, Panic attack, PONV (postoperative nausea and vomiting), Psychiatric illness, Sciatica, Shortness of breath, Sleep apnea, Sleep difficulties, Spitting up blood (05/21/2021), Status post placement of implantable loop recorder, Stroke ([facility] ) (11/2014), Stroke ([place] [address] ) (03/2021), and TIA (transient ischemic attack)  PSH:   has a past surgical history that includes Ascending aortic aneurysm repair; Appendectomy; Cholecystectomy; Cystoscopy; Lithotripsy; Aorta surgery; Knee arthroscopy (Right, 1996); Thumb arthroscopy (Right, 1976); pr fragment kidney stone/ eswl (Left, 5/17/2018); Upper gastrointestinal endoscopy; Colonoscopy; Cardiac Loop Recorder; pr lap,inguinal hernia repr,initial (Bilateral, 12/9/2021); and orthopedic surgery      Social:  Social History     Substance and Sexual Activity   Alcohol Use Not Currently    Comment: very rare in past     Social History     Tobacco Use   Smoking Status Never Smoker   Smokeless Tobacco Never Used   Tobacco Comment    no second hand smoke exposure     Social History     Substance and Sexual Activity   Drug Use Never     PE:  Vitals:    11/08/22 1300 11/08/22 1500 11/08/22 1900 11/09/22 0600   BP:  118/77 119/81    BP Location:  Right arm Right arm    Pulse:  (!) 107 104    Resp:  20     Temp:  98 5 °F (36 9 °C) 98 3 °F (36 8 °C)    TempSrc:  Oral Oral SpO2:  99% 95%    Weight: 86 kg (189 lb 9 5 oz)   86 6 kg (190 lb 14 7 oz)   Height:       General: VS reviewed  awake, alert  Well-nourished, well-developed  Appears stated age  Head: Normocephalic, atraumatic, nontender  Eyes: PERRL, EOM-I  No diplopia  No hyphema  No subconjunctival hemorrhages  Symmetrical lids  ENTAtraumatic external nose and ears  Dry MM  No stridor  Very hypophonic phonation  No drooling  Base of mouth is soft  No mastoid tenderness  Neck: Symmetric, trachea midline  No JVD  CV: +mild tachycardia   Peripheral pulses +2 throughout  No chest wall tenderness  Lungs:   Unlabored   No retractions  No crepitus  No tachypnea  No paradoxical motion  Abd: +BS, soft, NT/ND    MSK:   Small movements of all extremities noted  Focal LEFT upper extremity tremor noted  States he can't move his legs but moves them when demonstrating that he can't move it  No lower extremity edema  Skin: Dry, intact  Neuro: awake  Alert  Follows commands but slowly  Hx of LEFT stroke and then RIGHT stroke  Motor grossly intact but poor effort , 2/5 bilaterally equal strenght  Sensory grossly intact  Psychiatric/Behavioral: can't evaluate properly    Exam: deferred  A:  - Weakness  P:  - Labs  - likely admission given can't care for self  When discussing this, the tremor became significantly worse  - 13 point ROS was performed and all are normal unless stated in the history above  - Nursing note reviewed  Vitals reviewed  - Orders placed by myself and/or advanced practitioner / resident     - Previous chart was reviewed  - No language barrier    - History obtained from patient  - There are no limitations to the history obtained  - Critical care time: Not applicable for this patient       Code Status: Level 3 - DNAR and DNI  Advance Directive and Living Will:      Power of :    POLST:      Medications   pantoprazole (PROTONIX) EC tablet 40 mg (40 mg Oral Not Given 23/4/31 2469)   folic acid 1 mg, thiamine (VITAMIN B1) 100 mg in sodium chloride 0 9 % 100 mL IV piggyback ( Intravenous New Bag 11/8/22 0856)   insulin lispro (HumaLOG) 100 units/mL subcutaneous injection 1-5 Units (1 Units Subcutaneous Not Given 11/9/22 0654)   LORazepam (ATIVAN) injection 0 5 mg (0 5 mg Intravenous Given 11/5/22 2224)   potassium chloride 20 mEq IVPB (premix) (20 mEq Intravenous Not Given 11/6/22 1136)   potassium chloride (K-DUR,KLOR-CON) CR tablet 20 mEq (20 mEq Oral Not Given 11/8/22 1759)   enoxaparin (LOVENOX) subcutaneous injection 90 mg (90 mg Subcutaneous Given 11/8/22 2236)   docusate sodium (COLACE) capsule 100 mg (100 mg Oral Not Given 11/8/22 1759)   senna (SENOKOT) tablet 17 2 mg (17 2 mg Oral Not Given 11/8/22 1759)   sodium chloride 0 9 % bolus 1,000 mL (0 mL Intravenous Stopped 11/3/22 1815)   multi-electrolyte (ISOLYTE-S PH 7 4) bolus 1,000 mL (0 mL Intravenous Stopped 11/3/22 2212)   potassium chloride 20 mEq IVPB (premix) (0 mEq Intravenous Stopped 11/3/22 2213)   lactated ringers bolus 1,000 mL (1,000 mL Intravenous New Bag 11/3/22 2216)   magnesium sulfate IVPB (premix) SOLN 1 g (1 g Intravenous New Bag 11/4/22 0128)   potassium chloride 20 mEq IVPB (premix) (20 mEq Intravenous New Bag 11/4/22 0139)   potassium chloride 20 mEq IVPB (premix) (0 mEq Intravenous Stopped 11/5/22 1132)   metoprolol (LOPRESSOR) injection 2 5 mg (2 5 mg Intravenous Given 11/4/22 0640)   ceftriaxone (ROCEPHIN) 2 g/50 mL in dextrose IVPB (2,000 mg Intravenous New Bag 11/8/22 2235)   potassium chloride (K-DUR,KLOR-CON) CR tablet 40 mEq (40 mEq Oral Given 11/6/22 1202)   warfarin (COUMADIN) tablet 2 5 mg (2 5 mg Oral Given 11/7/22 1707)   warfarin (COUMADIN) tablet 2 5 mg (2 5 mg Oral Given 11/8/22 3243)     CT head without contrast   Final Result      No acute intracranial abnormality  Encephalomalacia involving the right occipital lobe and bilateral cerebelli    Grossly stable appearance of periventricular hypoattenuation compared to MRI brain 8/29/2022, likely representing chronic microvascular    ischemic changes  The study was marked in [facility] for immediate notification  Workstation performed: [id number]         XR chest 1 view portable   ED Interpretation   Status post sternotomy  Loop recorder in place  No acute cardiopulmonary disease process on my interpretation  Final Result      No acute cardiopulmonary disease  Workstation performed: [id number]           Orders Placed This Encounter   Procedures   • Blood culture   • Blood culture   • XR chest 1 view portable   • CT head without contrast   • CBC and differential   • Comprehensive metabolic panel   • CK (with reflex to MB)   • HS Troponin 0hr (reflex protocol)   • Magnesium   • Ammonia   • Protime-INR   • HS Troponin I 2hr   • HS Troponin I 4hr   • CKMB   • Basic metabolic panel   • Lactic Acid with Reflex STAT   • Procalcitonin   • UA w Reflex to Microscopic w Reflex to Culture   • Sodium, urine, random   • Creatinine, urine, random   • Osmolality, urine   • Osmolality-"If this is regarding a toxic alcohol, STOP  Test is not routinely indicated   Please consult medical  on call for further guidance "   • Urine Microscopic   • APTT   • CBC and differential   • Comprehensive metabolic panel   • Lactic acid, plasma   • Magnesium   • Procalcitonin, Next Day AM Collection   • Protime-INR   • TSH, 3rd generation with Free T4 reflex   • T4, free   • Basic metabolic panel   • Protime-INR   • Cardiolipin antibody   • Beta-2 glycoprotein antibodies   • Basic metabolic panel   • Equal mix, APTT   • Equal mix, PT / INR   • Protime-INR   • CBC and differential   • Lupus Anticoagulant Reflex   • APTT   • Protime-INR   • CBC and differential   • Basic metabolic panel   • Basic metabolic panel   • Phosphorus   • Vitamin K   • CK (with reflex to MB)   • Protime-INR   • APTT   • CBC and differential   • Basic metabolic panel   • CKMB   • CK (with reflex to MB)   • Protime-INR   • CBC and differential   • Basic metabolic panel   • CKMB   • Basic metabolic panel   • PTT-LA Mixing Study   • PTT-LA Incub Mix   • DRVVT Mix-LA   • DRVVT Confirm-LA   • Diet Dysphagia/Modified Consistency; Dysphagia 2-Mechanical Soft; Thin Liquid   • Insert peripheral IV   • Urine dip analyzer   • Nursing communication Continue IV as ordered     • Notify admitting physician   • Notify admitting physician on arrival   • Vital Signs per unit routine   • Daily weights   • I/O   • Incentive spirometry   • Apply SCD or Foot pumps   • Insulin Subcutaneous Notify Physician   • Insulin Subcutaneous Instruction   • Hypoglycemia Protocol   • Insert urinary catheter   • Insulin Subcutaneous Notify Physician   • Insulin Subcutaneous Instruction   • Hypoglycemia Protocol   • Fingerstick Glucose (POCT) Before meals and at bedtime   • Fingerstick Glucose (POCT)   • Skin care   • Skin care   • Skin care   • Skin care   • Skin care   • Wound care   • Initiate voiding trial   • Discontinue urinary catheter   • Urinary retention protocol   • Level 3 - DNAR (Do Not Attempt Resuscitation) and DNI (Do Not Intubate)   • Inpatient consult to Nephrology   • Inpatient consult to Neurology   • Inpatient consult to Hematology   • Inpatient consult to Psychiatry   • OT eval and treat   • PT eval and treat   • SPEECH bedside swallowing evaluation and treatment   • ECG 12 lead   • ECG 12 lead   • ECG 12 lead   • Inpatient Consult to Wound Care Nurse   • INPATIENT ADMISSION   • Fall precautions   • Update level of care   • Update level of care     Labs Reviewed   CBC AND DIFFERENTIAL - Abnormal       Result Value Ref Range Status    WBC 14 91 (*) 4 31 - 10 16 Thousand/uL Final    RBC 5 67 (*) 3 88 - 5 62 Million/uL Final    Hemoglobin 17 5 (*) 12 0 - 17 0 g/dL Final    Hematocrit 53 6 (*) 36 5 - 49 3 % Final    MCV 95  82 - 98 fL Final    MCH 30 9  26 8 - 34 3 pg Final    MCHC 32 6  31 4 - 37 4 g/dL Final    RDW 14 6  11 6 - 15 1 % Final    MPV 11 9  8 9 - 12 7 fL Final    Platelets 795  420 - 390 Thousands/uL Final    nRBC 0  /100 WBCs Final    Neutrophils Relative 87 (*) 43 - 75 % Final    Immat GRANS % 1  0 - 2 % Final    Lymphocytes Relative 6 (*) 14 - 44 % Final    Monocytes Relative 6  4 - 12 % Final    Eosinophils Relative 0  0 - 6 % Final    Basophils Relative 0  0 - 1 % Final    Neutrophils Absolute 12 99 (*) 1 85 - 7 62 Thousands/µL Final    Immature Grans Absolute 0 08  0 00 - 0 20 Thousand/uL Final    Lymphocytes Absolute 0 89  0 60 - 4 47 Thousands/µL Final    Monocytes Absolute 0 92  0 17 - 1 22 Thousand/µL Final    Eosinophils Absolute 0 01  0 00 - 0 61 Thousand/µL Final    Basophils Absolute 0 02  0 00 - 0 10 Thousands/µL Final   COMPREHENSIVE METABOLIC PANEL - Abnormal    Sodium 149 (*) 135 - 147 mmol/L Final    Potassium 2 8 (*) 3 5 - 5 3 mmol/L Final    Chloride 109 (*) 96 - 108 mmol/L Final    CO2 30  21 - 32 mmol/L Final    ANION GAP 10  4 - 13 mmol/L Final    BUN 77 (*) 5 - 25 mg/dL Final    Creatinine 2 60 (*) 0 60 - 1 30 mg/dL Final    Comment: Standardized to IDMS reference method    Glucose 102  65 - 140 mg/dL Final    Comment: If the patient is fasting, the ADA then defines impaired fasting glucose as > 100 mg/dL and diabetes as > or equal to 123 mg/dL  Specimen collection should occur prior to Sulfasalazine administration due to the potential for falsely depressed results  Specimen collection should occur prior to Sulfapyridine administration due to the potential for falsely elevated results  Calcium 10 2 (*) 8 3 - 10 1 mg/dL Final     (*) 5 - 45 U/L Final    Comment: Specimen collection should occur prior to Sulfasalazine administration due to the potential for falsely depressed results       ALT 85 (*) 12 - 78 U/L Final    Comment: Specimen collection should occur prior to Sulfasalazine and/or Sulfapyridine administration due to the potential for falsely depressed results  Alkaline Phosphatase 119 (*) 46 - 116 U/L Final    Total Protein 8 5 (*) 6 4 - 8 4 g/dL Final    Albumin 3 7  3 5 - 5 0 g/dL Final    Total Bilirubin 1 96 (*) 0 20 - 1 00 mg/dL Final    Comment: Use of this assay is not recommended for patients undergoing treatment with eltrombopag due to the potential for falsely elevated results  eGFR 25  ml/min/1 73sq m Final    Narrative:     [place] guidelines for Chronic Kidney Disease (CKD):   •  Stage 1 with normal or high GFR (GFR > 90 mL/min/1 73 square meters)  •  Stage 2 Mild CKD (GFR = 60-89 mL/min/1 73 square meters)  •  Stage 3A Moderate CKD (GFR = 45-59 mL/min/1 73 square meters)  •  Stage 3B Moderate CKD (GFR = 30-44 mL/min/1 73 square meters)  •  Stage 4 Severe CKD (GFR = 15-29 mL/min/1 73 square meters)  •  Stage 5 End Stage CKD (GFR <15 mL/min/1 73 square meters)  Note: GFR calculation is accurate only with a steady state creatinine   CK - Abnormal    Total CK 7,247 (*) 39 - 308 U/L Final   HS TROPONIN I 0HR - Abnormal    hs TnI 0hr 88 (*) "Refer to ACS Flowchart"- see link ng/L Final    Comment:                                              Initial (time 0) result  If >=50 ng/L, Myocardial injury suggested ;  Type of myocardial injury and treatment strategy  to be determined  If 5-49 ng/L, a delta result at 2 hours and or 4 hours will be needed to further evaluate  If <4 ng/L, and chest pain has been >3 hours since onset, patient may qualify for discharge based on the HEART score in the ED  If <5 ng/L and <3hours since onset of chest pain, a delta result at 2 hours will be needed to further evaluate  HS Troponin 99th Percentile URL of a Health Population=12 ng/L with a 95% Confidence Interval of 8-18 ng/L  Second Troponin (time 2 hours)  If calculated delta >= 20 ng/L,  Myocardial injury suggested ; Type of myocardial injury and treatment strategy to be determined    If 5-49 ng/L and the calculated delta is 5-19 ng/L, consult medical service for evaluation  Continue evaluation for ischemia on ecg and other possible etiology and repeat hs troponin at 4 hours  If delta is <5 ng/L at 2 hours, consider discharge based on risk stratification via the HEART score (if in ED), or MIGUELITO risk score in IP/Observation  HS Troponin 99th Percentile URL of a Health Population=12 ng/L with a 95% Confidence Interval of 8-18 ng/L  MAGNESIUM - Abnormal    Magnesium 3 0 (*) 1 6 - 2 6 mg/dL Final   AMMONIA - Abnormal    Ammonia <10 (*) 11 - 35 umol/L Final    Comment: Specimen collection should occur prior to Sulfasalazine administration due to the potential for falsely elevated results  Specimen collection should occur prior to Sulfapyridine administration due to the potential for falsely depressed results  PROTIME-INR - Abnormal    Protime 53 9 (*) 11 6 - 14 5 seconds Final    INR 6 02 (*) 0 84 - 1 19 Final   HS TROPONIN I 2HR - Abnormal    hs TnI 2hr 70 (*) "Refer to ACS Flowchart"- see link ng/L Final    Comment:                                              Initial (time 0) result  If >=50 ng/L, Myocardial injury suggested ;  Type of myocardial injury and treatment strategy  to be determined  If 5-49 ng/L, a delta result at 2 hours and or 4 hours will be needed to further evaluate  If <4 ng/L, and chest pain has been >3 hours since onset, patient may qualify for discharge based on the HEART score in the ED  If <5 ng/L and <3hours since onset of chest pain, a delta result at 2 hours will be needed to further evaluate  HS Troponin 99th Percentile URL of a Health Population=12 ng/L with a 95% Confidence Interval of 8-18 ng/L  Second Troponin (time 2 hours)  If calculated delta >= 20 ng/L,  Myocardial injury suggested ; Type of myocardial injury and treatment strategy to be determined  If 5-49 ng/L and the calculated delta is 5-19 ng/L, consult medical service for evaluation    Continue evaluation for ischemia on ecg and other possible etiology and repeat hs troponin at 4 hours  If delta is <5 ng/L at 2 hours, consider discharge based on risk stratification via the HEART score (if in ED), or MIGUELITO risk score in IP/Observation  HS Troponin 99th Percentile URL of a Health Population=12 ng/L with a 95% Confidence Interval of 8-18 ng/L  Delta 2hr hsTnI -18  <20 ng/L Final   CKMB - Abnormal    CK-MB Index <1 0  0 0 - 2 5 % Final    CK-MB 35 3 (*) 0 0 - 5 0 ng/mL Final   HS TROPONIN I 4HR - Abnormal    hs TnI 4hr 77 (*) "Refer to ACS Flowchart"- see link ng/L Final    Comment:                                              Initial (time 0) result  If >=50 ng/L, Myocardial injury suggested ;  Type of myocardial injury and treatment strategy  to be determined  If 5-49 ng/L, a delta result at 2 hours and or 4 hours will be needed to further evaluate  If <4 ng/L, and chest pain has been >3 hours since onset, patient may qualify for discharge based on the HEART score in the ED  If <5 ng/L and <3hours since onset of chest pain, a delta result at 2 hours will be needed to further evaluate  HS Troponin 99th Percentile URL of a Health Population=12 ng/L with a 95% Confidence Interval of 8-18 ng/L  Second Troponin (time 2 hours)  If calculated delta >= 20 ng/L,  Myocardial injury suggested ; Type of myocardial injury and treatment strategy to be determined  If 5-49 ng/L and the calculated delta is 5-19 ng/L, consult medical service for evaluation  Continue evaluation for ischemia on ecg and other possible etiology and repeat hs troponin at 4 hours  If delta is <5 ng/L at 2 hours, consider discharge based on risk stratification via the HEART score (if in ED), or MIGUELITO risk score in IP/Observation  HS Troponin 99th Percentile URL of a Health Population=12 ng/L with a 95% Confidence Interval of 8-18 ng/L      Delta 4hr hsTnI -11  <20 ng/L Final     Time reflects when diagnosis was documented in both MDM as applicable and the Disposition within this note       Time User Action Codes Description Comment    11/3/2022  9:58 PM Aida Worley Add [M62 82] Rhabdomyolysis     11/3/2022  9:58 PM Aida Worley Add [R63 8] Decreased oral intake     11/3/2022  9:58 PM Aida Worley Add [F41 9] Anxiety     11/3/2022  9:59 PM Aida Worley Add [G93 89] Encephalomalacia     11/3/2022  9:59 PM Aida Worley Add [R26 2] Ambulatory dysfunction     11/3/2022  9:59 PM Aida Worley Add [R53 1] Generalized weakness     11/3/2022  9:59 PM Aida Worley Add [N17 9] DAYAN [facility]acute kidney [facility]) [facility]     11/3/2022 10:00 PM Aida Worley Add [E87 6] Hypokalemia     11/4/2022 12:48 AM Osie Grumet Add [E87 0] Hypernatremia     11/4/2022  6:46 AM Osie Grumet Add [R79 1] Supratherapeutic INR     11/4/2022  6:46 AM Osie Grumet Add [D68 61] Antiphospholipid antibody with hypercoagulable state ([facility] )     11/7/2022  2:21 PM Facundo Blazing Add [F41 1] SHIMON (generalized anxiety disorder)           ED Disposition       ED Disposition   Admit    Condition   Stable    Date/Time   u Nov 3, 2022  9:58 PM    Comment   Case was discussed with Dr Odilon Hill, and the patient's admission status was agreed to be Admission Status: inpatient status to the service of Dr Odilon Hill  Follow-up Information    None       Current Discharge Medication List        CONTINUE these medications which have NOT CHANGED    Details   amLODIPine (NORVASC) 5 mg tablet Take 1 tablet (5 mg total) by mouth daily Do not start before October 27, 2022    Qty: 30 tablet, Refills: 0    Associated Diagnoses: Primary hypertension      ARIPiprazole (ABILIFY) 5 mg tablet Take 1 tablet (5 mg total) by mouth daily at bedtime  Qty: 30 tablet, Refills: 2    Associated Diagnoses: SHIMON (generalized anxiety disorder)      atorvastatin (LIPITOR) 40 mg tablet Take 1 tablet (40 mg total) by mouth daily with dinner  Qty: 90 tablet, Refills: 1    Associated Diagnoses: Hyperlipidemia carvedilol (COREG) 25 mg tablet Take 1 5 tablets (37 5 mg total) by mouth 2 (two) times a day with meals  Qty: 45 tablet, Refills: 0    Associated Diagnoses: Primary hypertension; Dizziness; Chest pain      docusate sodium (COLACE) 100 mg capsule Take 1 capsule (100 mg total) by mouth daily at bedtime  Qty: 30 capsule, Refills: 0    Associated Diagnoses: Constipation, unspecified constipation type      famotidine (PEPCID) 40 MG tablet Take 1 tablet (40 mg total) by mouth 2 (two) times a day  Qty: 90 tablet, Refills: 1    Associated Diagnoses: Gastroesophageal reflux disease without esophagitis      LORazepam (ATIVAN) 0 5 mg tablet Take 0 5 tablets (0 25 mg total) by mouth daily at bedtime for 10 days  Refills: 0    Comments: Not to exceed 5 additional fills before 02/12/2023 DX Code Needed    Associated Diagnoses: Anxiety disorder      melatonin 5 MG TABS Take 1 tablet (5 mg total) by mouth daily at bedtime  Qty: 30 tablet, Refills: 0    Associated Diagnoses: SHIMON (generalized anxiety disorder)      mirtazapine (REMERON) 45 MG tablet Take 1 tablet (45 mg total) by mouth daily at bedtime  Qty: 30 tablet, Refills: 2    Associated Diagnoses: SHIMON (generalized anxiety disorder)      pantoprazole (PROTONIX) 40 mg tablet Take 1 tablet (40 mg total) by mouth 2 (two) times a day  Qty: 60 tablet, Refills: 0    Associated Diagnoses: GERD (gastroesophageal reflux disease)      torsemide (DEMADEX) 10 mg tablet Take 1 tablet (10 mg total) by mouth daily Do not start before October 27, 2022  Qty: 30 tablet, Refills: 0    Associated Diagnoses: Primary hypertension; SIADH (syndrome of inappropriate ADH production) ([facility])      warfarin (COUMADIN) 4 mg tablet TAKE 1 TABLET BY MOUTH EVERY DAY  Qty: 90 tablet, Refills: 1    Associated Diagnoses: [facility]oxysmal atrial fibrillation ([facility] Cerebrovascular accident (CVA), unspecified mechanism ([facility] )           No discharge procedures on file    Prior to Admission Medications   Prescriptions Last Dose Informant Patient Reported? Taking? ARIPiprazole (ABILIFY) 5 mg tablet   No No   Sig: Take 1 tablet (5 mg total) by mouth daily at bedtime   LORazepam (ATIVAN) 0 5 mg tablet   No No   Sig: Take 0 5 tablets (0 25 mg total) by mouth daily at bedtime for 10 days   amLODIPine (NORVASC) 5 mg tablet   No No   Sig: Take 1 tablet (5 mg total) by mouth daily Do not start before October 27, 2022  atorvastatin (LIPITOR) 40 mg tablet  Self No No   Sig: Take 1 tablet (40 mg total) by mouth daily with dinner   carvedilol (COREG) 25 mg tablet   No No   Sig: Take 1 5 tablets (37 5 mg total) by mouth 2 (two) times a day with meals   docusate sodium (COLACE) 100 mg capsule   No No   Sig: Take 1 capsule (100 mg total) by mouth daily at bedtime   famotidine (PEPCID) 40 MG tablet  Self No No   Sig: Take 1 tablet (40 mg total) by mouth 2 (two) times a day   melatonin 5 MG TABS   No No   Sig: Take 1 tablet (5 mg total) by mouth daily at bedtime   mirtazapine (REMERON) 45 MG tablet   No No   Sig: Take 1 tablet (45 mg total) by mouth daily at bedtime   pantoprazole (PROTONIX) 40 mg tablet   No No   Sig: Take 1 tablet (40 mg total) by mouth 2 (two) times a day   torsemide (DEMADEX) 10 mg tablet   No No   Sig: Take 1 tablet (10 mg total) by mouth daily Do not start before October 27, 2022  warfarin (COUMADIN) 4 mg tablet   No No   Sig: TAKE 1 TABLET BY MOUTH EVERY DAY      Facility-Administered Medications: None       Portions of the record may have been created with voice recognition software  Occasional wrong word or "sound a like" substitutions may have occurred due to the inherent limitations of voice recognition software  Read the chart carefully and recognize, using context, where substitutions have occurred      Electronically signed by:  Johnny Ramesh

## 2022-11-04 PROBLEM — M62.82 RHABDOMYOLYSIS: Status: ACTIVE | Noted: 2022-11-04

## 2022-11-04 PROBLEM — A41.9 SEPSIS (HCC): Status: ACTIVE | Noted: 2022-11-04

## 2022-11-04 PROBLEM — E87.0 HYPERNATREMIA: Status: ACTIVE | Noted: 2022-11-04

## 2022-11-04 PROBLEM — N17.9 AKI (ACUTE KIDNEY INJURY) (HCC): Status: ACTIVE | Noted: 2022-11-04

## 2022-11-04 LAB
ALBUMIN SERPL BCP-MCNC: 2.4 G/DL (ref 3.5–5)
ALP SERPL-CCNC: 73 U/L (ref 46–116)
ALT SERPL W P-5'-P-CCNC: 62 U/L (ref 12–78)
ANION GAP SERPL CALCULATED.3IONS-SCNC: 3 MMOL/L (ref 4–13)
ANION GAP SERPL CALCULATED.3IONS-SCNC: 6 MMOL/L (ref 4–13)
ANION GAP SERPL CALCULATED.3IONS-SCNC: 8 MMOL/L (ref 4–13)
APTT 1H NP PPP: 29 SECONDS (ref 24–36)
APTT IMM NP PPP: 27.9 SECONDS (ref 24–36)
APTT PPP: 52 SECONDS (ref 23–37)
APTT PPP: 60 SECONDS (ref 23–37)
AST SERPL W P-5'-P-CCNC: 230 U/L (ref 5–45)
B2 GLYCOPROT1 IGA SERPL IA-ACNC: 3.3
B2 GLYCOPROT1 IGG SERPL IA-ACNC: 1.2
B2 GLYCOPROT1 IGM SERPL IA-ACNC: <2.4
BACTERIA UR QL AUTO: ABNORMAL /HPF
BASOPHILS # BLD AUTO: 0.02 THOUSANDS/ÂΜL (ref 0–0.1)
BASOPHILS # BLD AUTO: 0.02 THOUSANDS/ÂΜL (ref 0–0.1)
BASOPHILS NFR BLD AUTO: 0 % (ref 0–1)
BASOPHILS NFR BLD AUTO: 0 % (ref 0–1)
BILIRUB SERPL-MCNC: 1 MG/DL (ref 0.2–1)
BILIRUB UR QL STRIP: NEGATIVE
BUN SERPL-MCNC: 52 MG/DL (ref 5–25)
BUN SERPL-MCNC: 59 MG/DL (ref 5–25)
BUN SERPL-MCNC: 64 MG/DL (ref 5–25)
CALCIUM ALBUM COR SERPL-MCNC: 9.8 MG/DL (ref 8.3–10.1)
CALCIUM SERPL-MCNC: 8.3 MG/DL (ref 8.3–10.1)
CALCIUM SERPL-MCNC: 8.5 MG/DL (ref 8.3–10.1)
CALCIUM SERPL-MCNC: 8.7 MG/DL (ref 8.3–10.1)
CARDIOLIPIN IGA SER IA-ACNC: >159
CARDIOLIPIN IGG SER IA-ACNC: 1.5
CARDIOLIPIN IGM SER IA-ACNC: 13
CHLORIDE SERPL-SCNC: 116 MMOL/L (ref 96–108)
CHLORIDE SERPL-SCNC: 117 MMOL/L (ref 96–108)
CHLORIDE SERPL-SCNC: 117 MMOL/L (ref 96–108)
CLARITY UR: CLEAR
CO2 SERPL-SCNC: 27 MMOL/L (ref 21–32)
CO2 SERPL-SCNC: 28 MMOL/L (ref 21–32)
CO2 SERPL-SCNC: 29 MMOL/L (ref 21–32)
COLOR UR: ABNORMAL
CREAT SERPL-MCNC: 1.55 MG/DL (ref 0.6–1.3)
CREAT SERPL-MCNC: 1.69 MG/DL (ref 0.6–1.3)
CREAT SERPL-MCNC: 1.88 MG/DL (ref 0.6–1.3)
CREAT UR-MCNC: 144 MG/DL
EOSINOPHIL # BLD AUTO: 0.01 THOUSAND/ÂΜL (ref 0–0.61)
EOSINOPHIL # BLD AUTO: 0.01 THOUSAND/ÂΜL (ref 0–0.61)
EOSINOPHIL NFR BLD AUTO: 0 % (ref 0–6)
EOSINOPHIL NFR BLD AUTO: 0 % (ref 0–6)
ERYTHROCYTE [DISTWIDTH] IN BLOOD BY AUTOMATED COUNT: 14.6 % (ref 11.6–15.1)
ERYTHROCYTE [DISTWIDTH] IN BLOOD BY AUTOMATED COUNT: 14.7 % (ref 11.6–15.1)
GFR SERPL CREATININE-BSD FRML MDRD: 37 ML/MIN/1.73SQ M
GFR SERPL CREATININE-BSD FRML MDRD: 42 ML/MIN/1.73SQ M
GFR SERPL CREATININE-BSD FRML MDRD: 47 ML/MIN/1.73SQ M
GLUCOSE SERPL-MCNC: 119 MG/DL (ref 65–140)
GLUCOSE SERPL-MCNC: 121 MG/DL (ref 65–140)
GLUCOSE SERPL-MCNC: 128 MG/DL (ref 65–140)
GLUCOSE SERPL-MCNC: 128 MG/DL (ref 65–140)
GLUCOSE SERPL-MCNC: 144 MG/DL (ref 65–140)
GLUCOSE SERPL-MCNC: 154 MG/DL (ref 65–140)
GLUCOSE SERPL-MCNC: 163 MG/DL (ref 65–140)
GLUCOSE SERPL-MCNC: 177 MG/DL (ref 65–140)
GLUCOSE UR STRIP-MCNC: NEGATIVE MG/DL
HCT VFR BLD AUTO: 37.9 % (ref 36.5–49.3)
HCT VFR BLD AUTO: 39.3 % (ref 36.5–49.3)
HGB BLD-MCNC: 12.7 G/DL (ref 12–17)
HGB BLD-MCNC: 12.9 G/DL (ref 12–17)
HGB UR QL STRIP.AUTO: ABNORMAL
HYALINE CASTS #/AREA URNS LPF: ABNORMAL /LPF
IMM GRANULOCYTES # BLD AUTO: 0.04 THOUSAND/UL (ref 0–0.2)
IMM GRANULOCYTES # BLD AUTO: 0.04 THOUSAND/UL (ref 0–0.2)
IMM GRANULOCYTES NFR BLD AUTO: 0 % (ref 0–2)
IMM GRANULOCYTES NFR BLD AUTO: 0 % (ref 0–2)
INR PPP: 7.3 (ref 0.84–1.19)
INR PPP: 8.28 (ref 0.84–1.19)
INR PPP: 9.33 (ref 0.84–1.19)
KETONES UR STRIP-MCNC: NEGATIVE MG/DL
LACTATE SERPL-SCNC: 1.5 MMOL/L (ref 0.5–2)
LACTATE SERPL-SCNC: 2.4 MMOL/L (ref 0.5–2)
LEUKOCYTE ESTERASE UR QL STRIP: NEGATIVE
LYMPHOCYTES # BLD AUTO: 0.77 THOUSANDS/ÂΜL (ref 0.6–4.47)
LYMPHOCYTES # BLD AUTO: 0.9 THOUSANDS/ÂΜL (ref 0.6–4.47)
LYMPHOCYTES NFR BLD AUTO: 7 % (ref 14–44)
LYMPHOCYTES NFR BLD AUTO: 8 % (ref 14–44)
MAGNESIUM SERPL-MCNC: 2.9 MG/DL (ref 1.6–2.6)
MCH RBC QN AUTO: 30.7 PG (ref 26.8–34.3)
MCH RBC QN AUTO: 31.2 PG (ref 26.8–34.3)
MCHC RBC AUTO-ENTMCNC: 32.3 G/DL (ref 31.4–37.4)
MCHC RBC AUTO-ENTMCNC: 34 G/DL (ref 31.4–37.4)
MCV RBC AUTO: 92 FL (ref 82–98)
MCV RBC AUTO: 95 FL (ref 82–98)
MONOCYTES # BLD AUTO: 0.8 THOUSAND/ÂΜL (ref 0.17–1.22)
MONOCYTES # BLD AUTO: 0.81 THOUSAND/ÂΜL (ref 0.17–1.22)
MONOCYTES NFR BLD AUTO: 7 % (ref 4–12)
MONOCYTES NFR BLD AUTO: 8 % (ref 4–12)
MUCOUS THREADS UR QL AUTO: ABNORMAL
NEUTROPHILS # BLD AUTO: 8.9 THOUSANDS/ÂΜL (ref 1.85–7.62)
NEUTROPHILS # BLD AUTO: 9.23 THOUSANDS/ÂΜL (ref 1.85–7.62)
NEUTS SEG NFR BLD AUTO: 84 % (ref 43–75)
NEUTS SEG NFR BLD AUTO: 86 % (ref 43–75)
NITRITE UR QL STRIP: NEGATIVE
NON-SQ EPI CELLS URNS QL MICRO: ABNORMAL /HPF
NRBC BLD AUTO-RTO: 0 /100 WBCS
NRBC BLD AUTO-RTO: 0 /100 WBCS
OSMOLALITY UR/SERPL-RTO: 334 MMOL/KG (ref 282–298)
OSMOLALITY UR: 568 MMOL/KG
PH UR STRIP.AUTO: 5 [PH]
PLATELET # BLD AUTO: 156 THOUSANDS/UL (ref 149–390)
PLATELET # BLD AUTO: 169 THOUSANDS/UL (ref 149–390)
PMV BLD AUTO: 11.4 FL (ref 8.9–12.7)
PMV BLD AUTO: 12.3 FL (ref 8.9–12.7)
POTASSIUM SERPL-SCNC: 3.1 MMOL/L (ref 3.5–5.3)
POTASSIUM SERPL-SCNC: 3.3 MMOL/L (ref 3.5–5.3)
POTASSIUM SERPL-SCNC: 3.4 MMOL/L (ref 3.5–5.3)
PROCALCITONIN SERPL-MCNC: 0.24 NG/ML
PROCALCITONIN SERPL-MCNC: 0.29 NG/ML
PROT SERPL-MCNC: 5.5 G/DL (ref 6.4–8.4)
PROT UR STRIP-MCNC: ABNORMAL MG/DL
PROTHROMBIN TIME: 62.6 SECONDS (ref 11.6–14.5)
PROTHROMBIN TIME: 66.4 SECONDS (ref 11.6–14.5)
PROTHROMBIN TIME: 69 SECONDS (ref 11.6–14.5)
PROTHROMBIN TIME: 75.7 SECONDS (ref 11.6–14.5)
PT 1H NP PPP: 15.5 SEC (ref 12–14.3)
PT IMM NP PPP: 15.2 SECONDS (ref 12–14.3)
RBC # BLD AUTO: 4.13 MILLION/UL (ref 3.88–5.62)
RBC # BLD AUTO: 4.14 MILLION/UL (ref 3.88–5.62)
RBC #/AREA URNS AUTO: ABNORMAL /HPF
SODIUM 24H UR-SCNC: <5 MOL/L
SODIUM SERPL-SCNC: 148 MMOL/L (ref 135–147)
SODIUM SERPL-SCNC: 150 MMOL/L (ref 135–147)
SODIUM SERPL-SCNC: 153 MMOL/L (ref 135–147)
SP GR UR STRIP.AUTO: 1.02 (ref 1–1.03)
T4 FREE SERPL-MCNC: 1.56 NG/DL (ref 0.76–1.46)
TSH SERPL DL<=0.05 MIU/L-ACNC: 0.28 UIU/ML (ref 0.45–4.5)
UROBILINOGEN UR STRIP-ACNC: <2 MG/DL
WBC # BLD AUTO: 10.67 THOUSAND/UL (ref 4.31–10.16)
WBC # BLD AUTO: 10.88 THOUSAND/UL (ref 4.31–10.16)
WBC #/AREA URNS AUTO: ABNORMAL /HPF

## 2022-11-04 RX ORDER — POTASSIUM CHLORIDE 20MEQ/15ML
40 LIQUID (ML) ORAL ONCE
Status: DISCONTINUED | OUTPATIENT
Start: 2022-11-04 | End: 2022-11-04

## 2022-11-04 RX ORDER — INSULIN LISPRO 100 [IU]/ML
1-5 INJECTION, SOLUTION INTRAVENOUS; SUBCUTANEOUS
Status: DISCONTINUED | OUTPATIENT
Start: 2022-11-04 | End: 2022-11-09

## 2022-11-04 RX ORDER — DEXTROSE MONOHYDRATE 50 MG/ML
125 INJECTION, SOLUTION INTRAVENOUS CONTINUOUS
Status: DISCONTINUED | OUTPATIENT
Start: 2022-11-04 | End: 2022-11-06

## 2022-11-04 RX ORDER — DEXTROSE MONOHYDRATE 50 MG/ML
250 INJECTION, SOLUTION INTRAVENOUS CONTINUOUS
Status: DISCONTINUED | OUTPATIENT
Start: 2022-11-04 | End: 2022-11-04

## 2022-11-04 RX ORDER — POTASSIUM CHLORIDE 14.9 MG/ML
20 INJECTION INTRAVENOUS ONCE
Status: COMPLETED | OUTPATIENT
Start: 2022-11-04 | End: 2022-11-04

## 2022-11-04 RX ORDER — MAGNESIUM SULFATE 1 G/100ML
1 INJECTION INTRAVENOUS ONCE
Status: COMPLETED | OUTPATIENT
Start: 2022-11-04 | End: 2022-11-04

## 2022-11-04 RX ORDER — POTASSIUM CHLORIDE 14.9 MG/ML
20 INJECTION INTRAVENOUS ONCE
Status: COMPLETED | OUTPATIENT
Start: 2022-11-04 | End: 2022-11-05

## 2022-11-04 RX ORDER — METOPROLOL TARTRATE 5 MG/5ML
2.5 INJECTION INTRAVENOUS ONCE
Status: COMPLETED | OUTPATIENT
Start: 2022-11-04 | End: 2022-11-04

## 2022-11-04 RX ORDER — INSULIN LISPRO 100 [IU]/ML
1-5 INJECTION, SOLUTION INTRAVENOUS; SUBCUTANEOUS EVERY 6 HOURS SCHEDULED
Status: DISCONTINUED | OUTPATIENT
Start: 2022-11-04 | End: 2022-11-04

## 2022-11-04 RX ADMIN — MAGNESIUM SULFATE HEPTAHYDRATE 1 G: 1 INJECTION, SOLUTION INTRAVENOUS at 01:28

## 2022-11-04 RX ADMIN — CEFTRIAXONE SODIUM 2000 MG: 10 INJECTION, POWDER, FOR SOLUTION INTRAVENOUS at 21:57

## 2022-11-04 RX ADMIN — THIAMINE HYDROCHLORIDE: 100 INJECTION, SOLUTION INTRAMUSCULAR; INTRAVENOUS at 04:36

## 2022-11-04 RX ADMIN — POTASSIUM CHLORIDE 20 MEQ: 14.9 INJECTION, SOLUTION INTRAVENOUS at 06:38

## 2022-11-04 RX ADMIN — DEXTROSE 125 ML/HR: 5 SOLUTION INTRAVENOUS at 09:52

## 2022-11-04 RX ADMIN — METOROPROLOL TARTRATE 2.5 MG: 5 INJECTION, SOLUTION INTRAVENOUS at 06:40

## 2022-11-04 RX ADMIN — DEXTROSE 250 ML/HR: 5 SOLUTION INTRAVENOUS at 08:45

## 2022-11-04 RX ADMIN — POTASSIUM CHLORIDE 20 MEQ: 14.9 INJECTION, SOLUTION INTRAVENOUS at 01:39

## 2022-11-04 RX ADMIN — DEXTROSE 75 ML/HR: 5 SOLUTION INTRAVENOUS at 01:19

## 2022-11-04 RX ADMIN — CEFTRIAXONE SODIUM 2000 MG: 10 INJECTION, POWDER, FOR SOLUTION INTRAVENOUS at 00:21

## 2022-11-04 NOTE — PHYSICAL THERAPY NOTE
Physical Therapy Cancellation Note           11/04/22 0801   Note Type   Note type Cancelled Session   Cancel Reasons Medical status     PT order received; chart reviewed; noted pt's INR is 8 28 this AM; will hold PT assessment/mobilization at this time; will follow as clinical course allows      Charlie White

## 2022-11-04 NOTE — ASSESSMENT & PLAN NOTE
-sodium 151, DAYAN, rhabdo  -initial thought process was this was a hypovolemic hypernatremia  -CT Head: No acute intracranial abnormality  Encephalomalacia involving the right occipital lobe and bilateral cerebelli  Grossly stable appearance of periventricular hypoattenuation compared to MRI brain 8/29/2022, likely representing chronic microvascular ischemic changes  Plan  > as above in rhabdo section

## 2022-11-04 NOTE — CONSULTS
Consultation - Nephrology   Shoshana Silverio 58 y o  male MRN: [id number]  Unit/Bed#: [facility] 408-01 Encounter: [id number]    ASSESSMENT/PLAN:   1  Acute kidney injury, POA:  Suspect prerenal azotemia in the setting of diuretics and poor p o  intake + rhabdomyolysis  · Creatinine 2 6 on admission and down to 1 69 today  · UA:  Moderate blood, trace protein, 2-4 WBC, innumerable hyaline cast  · Urine sodium less than 5  · Continue IV fluids--on D5W at 125cc/h  · Hold torsemide   · Repeat BMP at 3pm   2  Hypernatremia:  Due to poor p o  intake and diuretics  · Sodium 149 on admission and initially worsened to 153 with 1L bolus of NS, isolyte and LR   · Now on D5W and sodium trending down to 150  · Continue D5W at 125 cc/hour  3  Hypokalemia: K 2 8 on admission and trending up to 3 4 with replacement   · Currently getting k-dur 20meq IV x 1   4  Rhabdomyolysis: CK 7247 on admission  Continue IVF   5  Sepsis: on ceftriaxone and cultures pending   6  Hx CVA x 3 in the setting of antiphospholipid antibody syndrome   · INR currently over 9 so Coumadin of old    HISTORY OF PRESENT ILLNESS:  Requesting Physician: Jaclyn Warner DO  Reason for Consult: DAYAN, Hypernatremia     Shoshana Silverio is a 58y o  year old male who was admitted to [facility] with fatigue and poor appetite  Patient reported also feeling confused at home that he could not move his legs properly  He does have a history of 3 strokes in the past due to anti phospholipid antibody syndrome  According to his sister he was dragging himself around house because he was too weak to walk  Patient also tells me he has not been able to eat because of “all the crap in his mouth”  Had a recent admission at [address] [place] [address] [address] unit for severe anxiety and during that admission his Zoloft was stopped due to hyponatremia and changed to Abilify  On admission he was found have a creatinine of 2 6 and sodium of 149    Sodium worsened to 153 last night so Nephrology was consulted  Patient currently tells me that he feels terrible but has no specific complaints  He is very slow to answer questions and has a tremor in his left hand which gets worse as I asked him questions  PAST MEDICAL HISTORY:  Past Medical History:   Diagnosis Date   • Aneurysm of thoracic aorta     last assessed 11/20/17   • Anxiety     currently on no meds   • Arthritis    • Bilateral inguinal hernia    • Cardiac disease    • Cognitive impairment    • CVA (cerebral vascular accident) ([facility] [address] )    • Depression    • GERD (gastroesophageal reflux disease)    • Hearing loss of aging    • Heart disease    • Hyperlipidemia    • Hypertension    • Irritable bowel syndrome    • Kidney stone    • Obesity    • Occasional tremors     left arm since stroke   • Palpitations    • Panic attack    • PONV (postoperative nausea and vomiting)     and headache x 3 days   • Psychiatric illness    • Sciatica     right and left  side   • Shortness of breath     on exertion   • Sleep apnea     is not using a CPAP   • Sleep difficulties    • Spitting up blood 05/21/2021    Resolved   • Status post placement of implantable loop recorder    • Stroke ([facility] [address] ) 11/2014   • Stroke ([facility] [address] ) 03/2021   • TIA (transient ischemic attack)        PAST SURGICAL HISTORY:  Past Surgical History:   Procedure Laterality Date   • AORTA SURGERY      thoracic - aneurysmorrhaphy   • APPENDECTOMY     • ASCENDING AORTIC ANEURYSM REPAIR      resolved 8/19/15   • CARDIAC LOOP RECORDER     • CHOLECYSTECTOMY      laparoscopic   • COLONOSCOPY     • CYSTOSCOPY      with removal of object- stent removal   • KNEE ARTHROSCOPY Right 1996    with medial meniscus repair   • LITHOTRIPSY      renal   • ORTHOPEDIC SURGERY     • ME FRAGMENT KIDNEY STONE/ ESWL Left 5/17/2018    Procedure: LITHROTRIPSY EXTRACORPORAL SHOCKWAVE (ESWL);   Surgeon: Kayleen Madera MD;  Location: [facility];  Service: Urology   • ME LAP,INGUINAL HERNIA REPR,INITIAL Bilateral 12/9/2021 Procedure: ROBOTIC REPAIR HERNIA INGUINAL;  Surgeon: Lashonda Alberts MD;  Location: [facility];  Service: General   • THUMB ARTHROSCOPY Right 1976    ligament repair   • UPPER GASTROINTESTINAL ENDOSCOPY         ALLERGIES:  Allergies   Allergen Reactions   • Losartan Angioedema   • Aspirin Fatigue     Due to blood thinners     • Bactrim [Sulfamethoxazole-Trimethoprim]    • Eliquis [Apixaban] Other (See Comments)     Failed  Had embolic CVA   • Lisinopril      Felt bad, was OK with Zestril brand name     • Tramadol        SOCIAL HISTORY:  Social History     Substance and Sexual Activity   Alcohol Use Not Currently    Comment: very rare in past     Social History     Substance and Sexual Activity   Drug Use Never     Social History     Tobacco Use   Smoking Status Never Smoker   Smokeless Tobacco Never Used   Tobacco Comment    no second hand smoke exposure       FAMILY HISTORY:  Family History   Problem Relation Age of Onset   • Diverticulitis Mother         of colon   • Nephrolithiasis Mother    • Emphysema Father    • Nephrolithiasis Sister    • Heart attack Maternal Grandfather 72   • Breast cancer Paternal Grandmother    • Lung cancer Paternal Grandfather    • Cancer Family    • Coronary artery disease Family    • Diabetes Family         sibling   • Hypertension Family         sibling   • Hernia Family         sibling       MEDICATIONS:  Scheduled Meds:  Current Facility-Administered Medications   Medication Dose Route Frequency Provider Last Rate   • cefTRIAXone  2,000 mg Intravenous Q24H Cassandra Flurry, DO 2,000 mg (11/04/22 0021)   • dextrose  125 mL/hr Intravenous Continuous Jaxon Sánchez Macdonald,  mL/hr (70/02/64 6281)   • folic acid 1 mg, thiamine 100 mg in 0 9% sodium chloride 100 mL IVPB   Intravenous Daily Cassandra Flurry,  mL/hr at 11/04/22 0436   • insulin lispro  1-5 Units Subcutaneous Q6H [facility] Cassandra Flurry, DO     • pantoprazole  40 mg Oral BID Cassandra Flurry, DO         PRN Meds:     Continuous Infusions:dextrose, 125 mL/hr, Last Rate: 125 mL/hr (11/04/22 2424)        REVIEW OF SYSTEMS:  A complete review of systems was done  Pertinent positives and negatives noted in the HPI but otherwise the review of systems is negative  PHYSICAL EXAM:  Current Weight: Weight - Scale: 85 4 kg (188 lb 4 4 oz)  First Weight: Weight - Scale: 85 2 kg (187 lb 13 3 oz)  Vitals:    11/04/22 0800   BP:    Pulse:    Resp:    Temp:    SpO2: 97%       Intake/Output Summary (Last 24 hours) at 11/4/2022 1035  Last data filed at 11/4/2022 9914  Gross per 24 hour   Intake 2642 5 ml   Output 525 ml   Net 2117 5 ml     General:  appears comfortable and in no acute distress   Skin:  No rash  Eyes:  Sclerae anicteric, no periorbital edema   ENT:  Dry mucous membranes  Neck:  Trachea midline, symmetric   Chest:  Clear to auscultation bilaterally with no wheezes, rales or rhonchi  CVS:  Regular rate and rhythm  Abdomen:  Soft, nontender, nondistended  Neuro:  Awake and alert  Psych:  Appropriate affect  Extremities: no lower extremity edema , shaking in L hand   : mcgregor in place with clear urine output     Lab Results:   Results from last 7 days   Lab Units 11/04/22  0818 11/04/22  0000 11/03/22  2300 11/03/22  1621   WBC Thousand/uL  --   --   --  14 91*   HEMOGLOBIN g/dL  --   --   --  17 5*   HEMATOCRIT %  --   --   --  53 6*   PLATELETS Thousands/uL  --   --   --  258   SODIUM mmol/L 150* 153* 151* 149*   POTASSIUM mmol/L 3 4* 3 1* 3 1* 2 8*   CHLORIDE mmol/L 117* 117* 117* 109*   CO2 mmol/L 27 28 28 30   BUN mg/dL 59* 64* 73* 77*   CREATININE mg/dL 1 69* 1 88* 2 23* 2 60*   CALCIUM mg/dL 8 3 8 5 8 9 10 2*   MAGNESIUM mg/dL  --  2 9*  --  3 0*       Radiology Results:   CT head without contrast   Final Result by Jonathan Danielle MD (11/03 1930)      No acute intracranial abnormality  Encephalomalacia involving the right occipital lobe and bilateral cerebelli    Grossly stable appearance of periventricular hypoattenuation compared to MRI brain 8/29/2022, likely representing chronic microvascular    ischemic changes  The study was marked in [facility] for immediate notification  Workstation performed: [id number]         XR chest 1 view portable   ED Interpretation by Slime De La Garza MD (11/03 1902)   Status post sternotomy  Loop recorder in place  No acute cardiopulmonary disease process on my interpretation  Final Result by Jodee Romano MD (11/04 5328)      No acute cardiopulmonary disease                    Workstation performed: [id number]

## 2022-11-04 NOTE — ASSESSMENT & PLAN NOTE
-INR 6 02-has history of antiphospholipid syndrome and AFib  -no evidence of bleeding  Plan  > as INR mildly elevated and no source of bleeding, will hold off giving any reversal agents of supratherapeutic INR especially given patient's history of antiphospholipid syndrome and history of 3 previous CVA as well as having ischemic CVAs in the past despite being on Eliquis  > once INR therapeutic, will need warfarin restarted   > daily INR

## 2022-11-04 NOTE — UTILIZATION REVIEW
Initial Clinical Review    Admission: Date/Time/Statement:   Admission Orders (From admission, onward)     Ordered        11/03/22 2200  INPATIENT ADMISSION  Once                      Orders Placed This Encounter   Procedures   • INPATIENT ADMISSION     Standing Status:   Standing     Number of Occurrences:   1     Order Specific Question:   Level of Care     Answer:   Med Surg [16]     Order Specific Question:   Estimated length of stay     Answer:   More than 2 Midnights     Order Specific Question:   Certification     Answer:   I certify that inpatient services are medically necessary for this patient for a duration of greater than two midnights  See H&P and MD Progress Notes for additional information about the patient's course of treatment  ED Arrival Information     Expected   11/3/2022     Arrival   11/3/2022 15:28    Acuity   Urgent            Means of arrival   Ambulance    Escorted by   NAFISA Ibarra    Service   Hospitalist    Admission type   Emergency            Arrival complaint   Altered mental status           Chief Complaint   Patient presents with   • Altered Mental Status     Per EMS  "Recently admitted to [facility], and then discharged home  Not med compliant and not eating well  Stated that he feels confused "  Pt states that he feels fuzzy  Confirms that he has not been eating or drinking  Initial Presentation: 58 y o  male , [place] [facility] [place]  Admitted as Inpatient due to  Rhabdomyolysis  Date: 11/03/2022     During that hospitalization patient was found to have hyponatremia that they attributed to patient's being started on sertraline  On discharge patient was placed on Abilify, Ativan, mirtazapine and melatonin  Upon coming back to house with his sister Loli Andrea where he lives, patient has been even more fatigued than normal and is eating very little    She reports that patient has been crawling up and down the stairs because he has been so fatigued  She does report the patient has a history of 3 strokes in the past the patient has some baseline cognitive impairment from this  CPK > 7000  Acute kidney injury with creatinine up to 2 5 from a normal creatinine recently  Noted hypernatremia with sodium 151  In setting of minimal ambulation and has been going up the stairs by crawling in using his buttocks  Given 2 L normal saline in the ER  Telemetry  Given hypernatremia with sodium 151 will need to hold off additional aggressive IV fluids and switch to D5W at 75 cc/hour with checking of the sodium every 6 hours  Monitor sodium every 4 hours  Consult Nephrology  Neuro checks q 4 hours  Fall precautions  Concern with patient's ability to swallow and as such will keep NPO and consult speech for formal swallow evaluation  Daily labs  Day 2: 11/04/2022   Continue telemetry  Continue IV flds of D5W  Na q4h  Neuro checks q4h    11/04/2022  Consult Nephrology:   1  Acute kidney injury, POA:  Suspect prerenal azotemia in the setting of diuretics and poor p o  intake + rhabdomyolysis  · Creatinine 2 6 on admission and down to 1 69 today  · UA:  Moderate blood, trace protein, 2-4 WBC, innumerable hyaline cast  · Urine sodium less than 5  · Continue IV fluids--on D5W at 125cc/h  · Hold torsemide   · Repeat BMP at 3pm   2  Hypernatremia:  Due to poor p o  intake and diuretics  · Sodium 149 on admission and initially worsened to 153 with 1L bolus of NS, isolyte and LR   · Now on D5W and sodium trending down to 150  · Continue D5W at 125 cc/hour  3  Hypokalemia: K 2 8 on admission and trending up to 3 4 with replacement   · Currently getting k-dur 20meq IV x 1   4  Rhabdomyolysis: CK 7247 on admission  Continue IVF   5  Sepsis: on ceftriaxone and cultures pending   6   Hx CVA x 3 in the setting of antiphospholipid antibody syndrome   · INR currently over 9 so Coumadin on hold      ED Triage Vitals   Temperature Pulse Respirations Blood Pressure SpO2   11/03/22 1541 11/03/22 1540 11/03/22 1540 11/03/22 1540 11/03/22 1540   (!) 97 3 °F (36 3 °C) 97 16 123/81 95 %      Temp Source Heart Rate Source Patient Position - Orthostatic VS BP Location FiO2 (%)   11/03/22 1541 11/03/22 1615 11/03/22 1900 11/03/22 1900 --   Oral Monitor Lying Right arm       Pain Score       11/04/22 0300       No Pain          Wt Readings from Last 1 Encounters:   11/04/22 85 4 kg (188 lb 4 4 oz)     Additional Vital Signs:   Date/Time Temp Pulse Resp BP MAP (mmHg) SpO2 O2 Device Patient Position - Orthostatic VS   11/04/22 0800 -- -- -- -- -- 97 % None (Room air) --   11/04/22 0739 99 1 °F (37 3 °C) 90 20 131/81 97 97 % None (Room air) Lying   11/04/22 0300 98 9 °F (37 2 °C) 97 -- 113/71 88 97 % -- Lying   11/03/22 2230 97 5 °F (36 4 °C) 113 Abnormal  18 113/62 78 95 % None (Room air) Lying   11/03/22 2200 -- 102 18 105/63 79 98 % None (Room air) Lying   11/03/22 2130 -- 100 18 115/71 88 98 % None (Room air) Lying   11/03/22 2100 -- 104 17 135/79 102 98 % None (Room air) Lying   11/03/22 2030 -- 102 18 143/86 109 98 % None (Room air) Lying   11/03/22 2000 -- 98 18 126/74 95 98 % None (Room air) Lying   11/03/22 1945 -- 90 18 120/76 93 98 % None (Room air) Lying   11/03/22 1930 -- 94 18 135/78 100 100 % None (Room air) Lying   11/03/22 1900 -- 102 18 150/88 111 97 % None (Room air) Lying   11/03/22 1815 -- 98 18 146/88 111 97 % None (Room air) --   11/03/22 1745 -- 98 18 145/86 109 99 % None (Room air) --   11/03/22 1715 -- 90 18 147/71 98 98 % None (Room air) --   11/03/22 1615 -- 100 18 140/95 112 97 % None (Room air) --     Date and Time Eye Opening Best Verbal Response Best Motor Response [place] Coma Scale Score   11/04/22 0800 4 5 6 15   11/03/22 2230 4 5 6 15   11/03/22 1544 4 5 6 15           Pertinent Labs/Diagnostic Test Results:   CT head without contrast   Final Result by Rk Franklin MD (11/03 1930)      No acute intracranial abnormality  Encephalomalacia involving the right occipital lobe and bilateral cerebelli  Grossly stable appearance of periventricular hypoattenuation compared to MRI brain 8/29/2022, likely representing chronic microvascular    ischemic changes  XR chest 1 view portable   ED Interpretation by Lady Uday MD (11/03 1902)   Status post sternotomy  Loop recorder in place  No acute cardiopulmonary disease process on my interpretation  Final Result by Sejal Perez MD (11/04 1696)      No acute cardiopulmonary disease              Results from last 7 days   Lab Units 11/03/22  1621   WBC Thousand/uL 14 91*   HEMOGLOBIN g/dL 17 5*   HEMATOCRIT % 53 6*   PLATELETS Thousands/uL 258   NEUTROS ABS Thousands/µL 12 99*     Results from last 7 days   Lab Units 11/04/22  0818 11/04/22  0000 11/03/22  2300 11/03/22  1621   SODIUM mmol/L 150* 153* 151* 149*   POTASSIUM mmol/L 3 4* 3 1* 3 1* 2 8*   CHLORIDE mmol/L 117* 117* 117* 109*   CO2 mmol/L 27 28 28 30   ANION GAP mmol/L 6 8 6 10   BUN mg/dL 59* 64* 73* 77*   CREATININE mg/dL 1 69* 1 88* 2 23* 2 60*   EGFR ml/min/1 73sq m 42 37 30 25   CALCIUM mg/dL 8 3 8 5 8 9 10 2*   MAGNESIUM mg/dL  --  2 9*  --  3 0*     Results from last 7 days   Lab Units 11/04/22  0000 11/03/22  1621   AST U/L 230* 333*   ALT U/L 62 85*   ALK PHOS U/L 73 119*   TOTAL PROTEIN g/dL 5 5* 8 5*   ALBUMIN g/dL 2 4* 3 7   TOTAL BILIRUBIN mg/dL 1 00 1 96*   AMMONIA umol/L  --  <10*     Results from last 7 days   Lab Units 11/04/22  0738 11/04/22  0112   POC GLUCOSE mg/dl 163* 121     Results from last 7 days   Lab Units 11/04/22  0818 11/04/22  0000 11/03/22  2300 11/03/22  1621   GLUCOSE RANDOM mg/dL 177* 154* 151* 102     Results from last 7 days   Lab Units 11/04/22  0508   OSMOLALITY, SERUM mmol/*     Results from last 7 days   Lab Units 11/03/22  1621   CK TOTAL U/L 7,247*   CK MB INDEX % <1 0   CK MB ng/mL 35 3*     Results from last 7 days   Lab Units 11/03/22 2012 11/03/22  1816 11/03/22  1621   HS TNI 0HR ng/L  --   --  88*   HS TNI 2HR ng/L  --  70*  --    HSTNI D2 ng/L  --  -18  --    HS TNI 4HR ng/L 77*  --   --    HSTNI D4 ng/L -11  --   --      Results from last 7 days   Lab Units 11/04/22  0818 11/04/22  0451 11/03/22  1621   PROTIME seconds 75 7* 69 0* 53 9*   INR  9 33* 8 28* 6 02*   PTT seconds  --  52*  --      Results from last 7 days   Lab Units 11/04/22  0000   TSH 3RD GENERATON uIU/mL 0 282*     Results from last 7 days   Lab Units 11/04/22  0000 11/03/22  2300   PROCALCITONIN ng/ml 0 24 0 29*     Results from last 7 days   Lab Units 11/04/22  0000 11/03/22  2300   LACTIC ACID mmol/L 1 5 2 4*     Results from last 7 days   Lab Units 11/04/22  0508 11/04/22  0113   OSMOLALITY, SERUM mmol/*  --    OSMO UR mmol/KG  --  568     Results from last 7 days   Lab Units 11/04/22  0206 11/04/22  0113   CLARITY UA  Clear  --    COLOR UA  Light Yellow  --    SPEC GRAV UA  1 017  --    PH UA  5 0  --    GLUCOSE UA mg/dl Negative  --    KETONES UA mg/dl Negative  --    BLOOD UA  Moderate*  --    PROTEIN UA mg/dl Trace*  --    NITRITE UA  Negative  --    BILIRUBIN UA  Negative  --    UROBILINOGEN UA (BE) mg/dl <2 0  --    LEUKOCYTES UA  Negative  --    WBC UA /hpf 2-4*  --    RBC UA /hpf 1-2  --    BACTERIA UA /hpf None Seen  --    EPITHELIAL CELLS WET PREP /hpf Occasional  --    MUCUS THREADS  Occasional*  --    SODIUM UR   --  <5   CREATININE UR mg/dL  --  144 0     Results from last 7 days   Lab Units 11/03/22  2300   BLOOD CULTURE  Received in Microbiology Lab  Culture in Progress  Received in Microbiology Lab  Culture in Progress       ED Treatment:   Medication Administration from 11/03/2022 1528 to 11/03/2022 2224       Date/Time Order Dose Route Action     11/03/2022 1625 sodium chloride 0 9 % bolus 1,000 mL 1,000 mL Intravenous New Bag     11/03/2022 1819 multi-electrolyte (ISOLYTE-S PH 7 4) bolus 1,000 mL 1,000 mL Intravenous New Bag     11/03/2022 2012 potassium chloride 20 mEq IVPB (premix) 20 mEq Intravenous New Bag     11/03/2022 1819 potassium chloride 20 mEq IVPB (premix) 20 mEq Intravenous New Bag     11/03/2022 2216 lactated ringers bolus 1,000 mL 1,000 mL Intravenous New Bag        Past Medical History:   Diagnosis Date   • Aneurysm of thoracic aorta     last assessed 11/20/17   • Anxiety     currently on no meds   • Arthritis    • Bilateral inguinal hernia    • Cardiac disease    • Cognitive impairment    • CVA (cerebral vascular accident) ([facility] )    • Depression    • GERD (gastroesophageal reflux disease)    • Hearing loss of aging    • Heart disease    • Hyperlipidemia    • Hypertension    • Irritable bowel syndrome    • Kidney stone    • Obesity    • Occasional tremors     left arm since stroke   • Palpitations    • Panic attack    • PONV (postoperative nausea and vomiting)     and headache x 3 days   • Psychiatric illness    • Sciatica     right and left  side   • Shortness of breath     on exertion   • Sleep apnea     is not using a CPAP   • Sleep difficulties    • Spitting up blood 05/21/2021    Resolved   • Status post placement of implantable loop recorder    • Stroke ([facility] ) 11/2014   • Stroke ([place] [address] ) 03/2021   • TIA (transient ischemic attack)      Present on Admission:  • Atrial fibrillation (HCC)  • [facility] )  • Supratherapeutic INR  • Antiphospholipid antibody with hypercoagulable state ([facility] )  • SHIMON (generalized anxiety disorder)      Admitting Diagnosis: Rhabdomyolysis [M62 82]  Hypokalemia [E87 6]  Encephalomalacia [G93 89]  Anxiety [F41 9]  DAYAN (acute kidney injury) ([facility] ) [N17 9]  Decreased oral intake [R63 8]  Generalized weakness [R53 1]  Ambulatory dysfunction [R26 2]  AMS (altered mental status) [R41 82]  Age/Sex: 58 y o  male  Admission Orders:  NPO Fall precautions  Cardio-Pulmonary monitoring  Fingerstick glucose q6h  IS  Daily weight   I&O  Tate SCDs  Consult PT/OT/ST    Scheduled Medications:  cefTRIAXone, 2,000 mg, Intravenous, V67U  folic acid 1 mg, thiamine 100 mg in 0 9% sodium chloride 100 mL IVPB, , Intravenous, Daily  insulin lispro, 1-5 Units, Subcutaneous, Q6H DARLENE  pantoprazole, 40 mg, Oral, BID      Continuous IV Infusions:  dextrose, 125 mL/hr, Intravenous, Continuous  dextrose 5 % infusion  Rate: 250 mL/hr Dose: 250 mL/hr  Freq: Continuous Route: IV  Last Dose: Stopped (11/04/22 0951)  Start: 11/04/22 0045 End: 11/04/22 0950    PRN Meds:       IP CONSULT TO NEPHROLOGY  IP CONSULT TO NEUROLOGY  IP CONSULT TO HEMATOLOGY    Network Utilization Review Department  ATTENTION: Please call with any questions or concerns to [phone] and carefully listen to the prompts so that you are directed to the right person  All voicemails are confidential   Eric Moon all requests for admission clinical reviews, approved or denied determinations and any other requests to dedicated fax number below belonging to the campus where the patient is receiving treatment   List of dedicated fax numbers for the Facilities:  [address] DENIALS (Administrative/Medical Necessity) [phone]   [address] (Maternity/NICU/Pediatrics) [phone]   0 Yanelis Dinero [phone]   [address] [phone]   [address] [place] [address] [facility] [phone]   [address] Jaymie Maradiaga [facility] [place] [address] 815 [facility] [phone]

## 2022-11-04 NOTE — MALNUTRITION/BMI
This medical record reflects one or more clinical indicators suggestive of malnutrition and/or morbid obesity  Malnutrition Findings:   Adult Malnutrition type: Chronic illness  Adult Degree of Malnutrition: Other severe protein calorie malnutrition  Malnutrition Characteristics: Inadequate energy, Weight loss                  360 Statement: Severe Pro/cathryn malnutrition r/t inadequate intake/anxiety as evidenced by 28% unplanned weight loss since 5/3/22, consuming < 75% energy intake compared to estimated enrgy needs > 1 month  Treatment TBD s/p swallow eval        See Nutrition note dated 11/4/22 for additional details  Completed nutrition assessment is viewable in the nutrition documentation

## 2022-11-04 NOTE — ASSESSMENT & PLAN NOTE
-on PTA carvedilol and warfarin  Plan  > continue carvedilol    Hold warfarin given supratherapeutic INR

## 2022-11-04 NOTE — ASSESSMENT & PLAN NOTE
-WBC 14 K, heart rate 114,  lactic acid 2 3  -chest x-ray with no obvious focal infiltrate; daughter reports diffuse weakness and at times patient coughing after eating  -urine with foul odor-awaiting UA  Plan  > infectious workup with blood cultures drawn  Start empiric ceftriaxone for possible UTI and will need to assess UA  Another potential source is developing aspiration pneumonia but no infiltrate seen on my read of chest x-ray

## 2022-11-04 NOTE — ASSESSMENT & PLAN NOTE
-Hx CVA x 3 in setting of antiphospholipid syndrome  -on PTA warfarin although noted supratherapeutic INR  -CT head in ER: No acute intracranial abnormality  Encephalomalacia involving the right occipital lobe and bilateral cerebelli  Grossly stable appearance of periventricular hypoattenuation compared to MRI brain 8/29/2022, likely representing chronic microvascular ischemic changes  -on exam answers questions in a very low voice when asked, noted left upper extremity tremor that remains constant with movement, diffuse fatigue of bilateral lower extremities  Plan  > neurology consultation    Holding PTA warfarin given supratherapeutic INR, neuro checks q 4 hours

## 2022-11-04 NOTE — SPEECH THERAPY NOTE
Speech Language/Pathology  Speech-Language Pathology Bedside Swallow Evaluation        Patient Name: Lonza Goldmann NYWAQBAILEY Date: 11/4/2022     Problem List  Principal Problem:    Rhabdomyolysis  Active Problems:    SHIMON (generalized anxiety disorder)    Atrial fibrillation (HCC)    Antiphospholipid antibody with hypercoagulable state ([facility] )    CV[facility]cere[facility] accident) ([facility] )    Supratherapeutic INR    Hypernatremia    DAYAN (acute kidney injury) ([facility] )    Sepsis ([facility] )         Past Medical History  Past Medical History:   Diagnosis Date   • Aneurysm of thoracic aorta     last assessed 11/20/17   • Anxiety     currently on no meds   • Arthritis    • Bilateral inguinal hernia    • Cardiac disease    • Cognitive impairment    • CVA (cerebral vascular accident) ([facility] )    • Depression    • GERD (gastroesophageal reflux disease)    • Hearing loss of aging    • Heart disease    • Hyperlipidemia    • Hypertension    • Irritable bowel syndrome    • Kidney stone    • Obesity    • Occasional tremors     left arm since stroke   • Palpitations    • Panic attack    • PONV (postoperative nausea and vomiting)     and headache x 3 days   • Psychiatric illness    • Sciatica     right and left  side   • Shortness of breath     on exertion   • Sleep apnea     is not using a CPAP   • Sleep difficulties    • Spitting up blood 05/21/2021    Resolved   • Status post placement of implantable loop recorder    • Stroke ([facility] ) 11/2014   • Stroke ([facility] ) 03/2021   • TIA (transient ischemic attack)        Past Surgical History  Past Surgical History:   Procedure Laterality Date   • AORTA SURGERY      thoracic - aneurysmorrhaphy   • APPENDECTOMY     • ASCENDING AORTIC ANEURYSM REPAIR      resolved 8/19/15   • CARDIAC LOOP RECORDER     • CHOLECYSTECTOMY      laparoscopic   • COLONOSCOPY     • CYSTOSCOPY      with removal of object- stent removal   • KNEE ARTHROSCOPY Right 1996    with medial meniscus repair   • LITHOTRIPSY      renal   • ORTHOPEDIC SURGERY     • [place] FRAGMENT KIDNEY STONE/ ESWL Left 5/17/2018    Procedure: LITHROTRIPSY EXTRACORPORAL SHOCKWAVE (ESWL); Surgeon: Jordyn Mir MD;  [facility]: [facility];  Service: Urology   • [place] Ameena Raymundo 19 Bilateral 12/9/2021    Procedure: ROBOTIC REPAIR HERNIA INGUINAL;  Surgeon: Trina Saleh MD;  Location: [facility];  Service: General   • THUMB ARTHROSCOPY Right 1976    ligament repair   • UPPER GASTROINTESTINAL ENDOSCOPY         Summary    Pt presents with mild-mod oral dysphagia, including prolonged mastication and decreased bolus formation/cohesion and lingual residue with hard, dry solids; mastication and bolus formation/cohesion was improved with soft, moist solid  Pharyngeal swallows appear WFL, with no overt clinical s/s of aspiration  Given weakness and confusion, pt will need assistance with feeding, and is appropriate to begin a PO diet, with recommendations below  Pt would benefit from dysphagia tx  Recommendations:   Diet: mechanically altered/level 2 diet and thin liquids   Meds: whole with liquid and crushed with puree   Frequent Oral care: 4x/day  Full assist with meals  Aspiration precautions and compensatory swallowing strategies: upright posture, only feed when fully alert, slow rate of feeding, small bites/sips and feed only when fully alert, alternate bites/sips  Other Recommendations/ considerations: ST to see for dysphagia tx, 2-3x per week  Current Medical Status  Copied from admission/physician notes:  David Dixon is a 58 y o  male who has past medical history significant for CVA x3, antiphospholipid syndrome on PTA warfarin, thoracic aortic aneurysm, cognitive impairment, severe anxiety who presented to [facility] ER on the evening of 11/3 with worsening fatigue as well as decreased appetite    History obtained from speaking with patient's sister at home Kailee Velazquez who reports that patient was initially hospitalized for over a month from late September until late October at [place] for episodes of severe anxiety  She reports patient had some of the symptoms before he went into the hospital at [facility] but that when he was discharged about a week ago came out even more fatigued  During that hospitalization patient was found to have hyponatremia that they attributed to patient's being started on sertraline  On discharge patient was placed on Abilify, Ativan, mirtazapine and melatonin  Upon coming back to house with his sister Rubi Babcock where he lives, patient has been even more fatigued than normal and is eating very little  She reports that patient has been crawling up and down the stairs because he has been so fatigued  She does report the patient has a history of 3 strokes in the past the patient has some baseline cognitive impairment from this  Order received and chart reviewed  Discussed with RN  Pt has been kept NPO until seen by ST  Pt was seen by OP ST in the past for cognitive-linguistic impairment  Pt has had multiple CVA's in the past  Family reports some coughing with PO  Past medical history:   Please see H&P for details      Special Studies:  CT head-  No acute intracranial abnormality  Encephalomalacia involving the right occipital lobe and bilateral cerebelli  Grossly stable appearance of periventricular hypoattenuation compared to MRI brain 8/29/2022, likely representing chronic microvascular   ischemic changes  CXR-  No acute cardiopulmonary disease         Social/Education/Vocational Hx:  Pt lives with family    Swallow Information   Current Risks for Dysphagia & Aspiration: AMS and confusion, hx of CVA's, RN reports hx of Parkinson's but unable to locate that in chart  Current Symptoms/Concerns: family reports some coughing while eating, very poor PO intake lately  Current Diet: NPO except medications   Baseline Diet: regular diet and thin liquids    Baseline Assessment Behavior/Cognition: decreased attention and awake, repeating SLP name over and over  Speech/Language Status: able to follow commands inconsistently and limited verbal output  Patient Positioning: upright in bed     Swallow Mechanism Exam   Facial: symmetrical  Labial: bilateral decreased ROM and decreased strength  Lingual: bilateral decreased strength and decreased coordination  Velum: unable to visualize  Mandible:  decreased ROM  Dentition: adequate  Vocal quality:clear/adequate   Volitional Cough: unable to initiate volitional cough   Respiratory: RA, sats in upper 90s      Consistencies Assessed and Performance   Consistencies Administered: ice chips, thin liquids, puree, soft solids and hard solids    Oral Stage: Adequate bolus retrieval and draw from straw, prolonged mastication of hard, dry solid, pt states "I can't eat this cracker" and confirms it is too dry when asked  There was reduced bolus cohesion with dry solid and lingual  residue, most of which cleared with liquid wash  With soft, moist solid, mastication, bolus formation and cohesion was better  Adequate lip seal, with no spillage  Pharyngeal Stage: Hyolaryngeal elevation was observed and palpated  Swallows appeared fairly prompt  There were no overt s/s of aspiration, including with consecutive sips of water by straw  Esophageal Concerns: none observed, hx of GERD      Results Reviewed with: patient, RN and MD   Dysphagia Goals: 1  Pt will tolerate dysphagia 2 diet and thin liquids, with prompt oropharyngeal swallows and no overt s/s of aspiration  2  Pt will tolerate LRD without s/s of aspiration across all meals and snacks     Discharge recommendation: unsure, may not need follow up tx    Speech Therapy Prognosis   Prognosis: fair    Prognosis Considerations: age, medical status, prior medical history, cognitive status and therapeutic potential

## 2022-11-04 NOTE — ASSESSMENT & PLAN NOTE
Increased increased to 9 has history of antiphospholipid syndrome and AFib  -no evidence of bleeding  Plan  > as INR mildly elevated and no source of bleeding, will hold off giving any reversal agents of supratherapeutic INR especially given patient's history of antiphospholipid syndrome and history of 3 previous CVA as well as having ischemic CVAs in the past despite being on Eliquis  > once INR therapeutic, will need warfarin restarted   > daily INR

## 2022-11-04 NOTE — OCCUPATIONAL THERAPY NOTE
Occupational Therapy Cancellation Note        Patient Name: Ophelia Becker  DLIDT'Q Date: 11/4/2022 11/04/22 0802   OT Last Visit   OT Visit Date 11/04/22   Note Type   Note type Cancelled Session   Cancel Reasons Medical status       OT orders received, chart reviewed  Noted that pt has INR of 9 33  OT will hold at this time and continue to follow and see as medically appropriate and able       Bambi Loza, NANDO, OTR/L

## 2022-11-04 NOTE — ED NOTES
Patient fed chocolate pudding and applesauce as well as sips of water through a straw with assistance of nurse  No signs of choking, coughing, pocketing food, etc noted        Radha Andino RN  11/03/22 9062

## 2022-11-04 NOTE — CONSULTS
NEUROLOGY RESIDENCY CONSULT NOTE     Name: Barrera Heck   Age & Sex: 58 y o  male   MRN: [id number]  Unit/Bed#: [facility] 408-01   Encounter: [id number]  Length of Stay: 1    ASSESSMENT & PLAN     Tremor of right hand  Assessment & Plan  Barrera Heck is a 58 y o  male who has past medical history significant for CVA x3 (failed xarelto and eliquis in the past-now on coumadin ), afib, antiphospholipid syndrome on PTA warfarin, thoracic aortic aneurysm, cognitive impairment, severe anxiety who presented to [facility] ER on the evening of 11/3 with worsening fatigue as well as decreased appetite  Recently discharged from [facility] for psych hospitalization abilify, Ativan, mirtazapine and melatonin  Patient's sister had noted patient has been crawling up and down the stairs because he has been so fatigued  She does report the patient has a history of 3 strokes in the past the patient has some baseline cognitive impairment from this  Of note, patient has had a tremor that resulted after 1 of his past strokes  On exam patient has some baseline cognitive deficits in terms of being able to recollect what happened to him and how he got to the hospital   Otherwise he is completely oriented and able to quit answer all questions appropriately  There is some noted bilateral lower extremity weakness but this is possibly due to patient not wanting to participate in exam  Has had L hand 3-4 hz med amplitude circular tremor that is quite apparent at rest   There was rigidity appreciated in the left upper extremity with tremor is but can not appreciate on the right upper extremity even with contralateral augmentation  There was no rigidity appreciated as well as bilateral lower extremities  There is some noted masked facies appreciated      W/U:   Procal: 0 24  TSH 0 282, T4 1 56 (low)  Na: 153  Na: 1 88  Lactic Acid 1 5 <- 2 4   UA bland   CK: 7247 , CKMB 35 3     - CTH 11/03: No acute intracranial abnormality  Encephalomalacia involving the right occipital lobe and bilateral cerebelli  Grossly stable appearance of periventricular hypoattenuation compared to MRI brain 8/29/2022, likely representing chronic microvascular ischemic changes  (focal encephalomalacia and gliosis involving the right occipital lobe and bilateral cerebelli  Again seen are chronic lacunar infarcts in bilateral basal ganglia  Periventricular moderate patchy low-attenuation is again seen, grossly unchanged in extent compared to prior MRI brain 8/29/2022)      Impression:  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 given the patient seems to have masked facies as well as the tremor is at rest; although cogwheel rigidity is more appreciated in L arm/hand and could not appreciate on RUE or bilat LE even w/ contralateral augmentation     Plan   - no acute intervention hospital at this time  - would recommend to follow-up with outpatient neurology team Teresa Ga, Dr Caty Garcia) in 6-8 weeks to further address tremor whether a trial of sinemet is appropriate in the outpatient setting  - No other further recommendations from the inpatient Neurology perspective  - Please contact Neurology any further questions        CVA (cerebral vascular accident) [facility])  Assessment & Plan  Past hx of lacunar CVA x3 (failed xarelto and eliquis in the past-now on coumadin ), afib, antiphospholipid syndrome on PTA warfarin    Lab Results   Component Value Date    INR 9 33 (HH) 11/04/2022    INR 8 28 (HH) 11/04/2022    INR 6 02 (HH) 11/03/2022    INR 2 20 (H) 10/26/2022       Plan  - Would recommend to get INR to a therapeutic level as per primary team and then restart warfarin once INR is therapeutic for secondary stroke prevention  - Antiphospholipid syndrome management as per hematology        Sumeet Sagastume will need outpatient neurology team (Teresa Ga, Dr Caty Garcia) in 6-8 weeks to further address tremor whether a trial of sinemet is appropriate in the outpatient setting      SUBJECTIVE     Reason for Consult / Principal Problem: Tremor Evaluation and Stroke Protocol  Hx and PE limited by: Patient Effort, Baseline Cognitive Deficit    HPI: Frankie Parsons is a 58 y o   male with PMH of CVA x3, antiphospholipid syndrome, and anxiety who presented yesterday with decreased appetite, fatigue, and altered mental status  Per chart review and H&P per sister:   Patient was initially hospitalized for over a month from late September until late October at [place] for episodes of severe anxiety and was a 201  She reports patient had some of the symptoms before he went into the hospital at [facility] but that when he was discharged about a week ago came out even more fatigued  During that hospitalization patient was found to have hyponatremia that they attributed to patient's being started on sertraline  On discharge patient was placed on Abilify, Ativan, mirtazapine and melatonin  Upon coming back to house with his sister Nannette Cruz where he lives, patient has been even more fatigued than normal and is eating very little  She reports that patient has been crawling up and down the stairs because he has been so fatigued  She does report the patient has a history of 3 strokes in the past the patient has some baseline cognitive impairment from this  Upon admission, patient was found to have a supratherapeutic INR 6 02 and has history of antiphospholipid syndrome and Afib  There was no evidence of bleeding found  During consult, patient was acutely confused with flattened affect  He had no memory of events yesterday but was oriented to the situation  Repeated "I'm done" or "I can't" both to commands and during interview  Endorsed weakness in legs that he has had for "a while" and lower back pain from "a sore"  Has a tremor from past CVAs that he believes has increased in setting of current illness   Denied any recent falls, LOC, N/V,  balance issues, or paraesthesias  Inpatient consult to Neurology  Consult performed by: Kimberly Strong 96, DO  Consult ordered by: Ravindra Conde DO          Historical Information   Past Medical History:   Diagnosis Date   • Aneurysm of thoracic aorta     last assessed 11/20/17   • Anxiety     currently on no meds   • Arthritis    • Bilateral inguinal hernia    • Cardiac disease    • Cognitive impairment    • CVA (cerebral vascular accident) ([address] )    • Depression    • GERD (gastroesophageal reflux disease)    • Hearing loss of aging    • Heart disease    • Hyperlipidemia    • Hypertension    • Irritable bowel syndrome    • Kidney stone    • Obesity    • Occasional tremors     left arm since stroke   • Palpitations    • Panic attack    • PONV (postoperative nausea and vomiting)     and headache x 3 days   • Psychiatric illness    • Sciatica     right and left  side   • Shortness of breath     on exertion   • Sleep apnea     is not using a CPAP   • Sleep difficulties    • Spitting up blood 05/21/2021    Resolved   • Status post placement of implantable loop recorder    • Stroke ([address] ) 11/2014   • Stroke ([address] ) 03/2021   • TIA (transient ischemic attack)      Past Surgical History:   Procedure Laterality Date   • AORTA SURGERY      thoracic - aneurysmorrhaphy   • APPENDECTOMY     • ASCENDING AORTIC ANEURYSM REPAIR      resolved 8/19/15   • CARDIAC LOOP RECORDER     • CHOLECYSTECTOMY      laparoscopic   • COLONOSCOPY     • CYSTOSCOPY      with removal of object- stent removal   • KNEE ARTHROSCOPY Right 1996    with medial meniscus repair   • LITHOTRIPSY      renal   • ORTHOPEDIC SURGERY     • FL FRAGMENT KIDNEY STONE/ ESWL Left 5/17/2018    Procedure: LITHROTRIPSY EXTRACORPORAL SHOCKWAVE (ESWL);   Surgeon: Paul Matthew MD;  Location: [facility]  MAIN [facility];  Service: Urology   • FL [address] Bilateral 12/9/2021    Procedure: ROBOTIC REPAIR HERNIA INGUINAL;  Surgeon: Saba Cortes MD; Location: [facility];  Service: General   • THUMB ARTHROSCOPY Right 1976    ligament repair   • UPPER GASTROINTESTINAL ENDOSCOPY       Social History   Social History     Substance and Sexual Activity   Alcohol Use Not Currently    Comment: very rare in past     Social History     Substance and Sexual Activity   Drug Use Never     E-Cigarette/Vaping   • E-Cigarette Use Never User      E-Cigarette/Vaping Substances   • Nicotine No    • THC No    • CBD No    • Flavoring No    • Other No    • Unknown No      Social History     Tobacco Use   Smoking Status Never Smoker   Smokeless Tobacco Never Used   Tobacco Comment    no second hand smoke exposure     Family History:   Family History   Problem Relation Age of Onset   • Diverticulitis Mother         of colon   • Nephrolithiasis Mother    • Emphysema Father    • Nephrolithiasis Sister    • Heart attack Maternal Grandfather 72   • Breast cancer Paternal Grandmother    • Lung cancer Paternal Grandfather    • Cancer Family    • Coronary artery disease Family    • Diabetes Family         sibling   • Hypertension Family         sibling   • Hernia Family         sibling     Meds/Allergies   all current active meds have been reviewed, current meds:   Current Facility-Administered Medications   Medication Dose Route Frequency   • ceftriaxone (ROCEPHIN) 2 g/50 mL in dextrose IVPB  2,000 mg Intravenous Q24H   • dextrose 5 % infusion  125 mL/hr Intravenous Continuous   • folic acid 1 mg, thiamine (VITAMIN B1) 100 mg in sodium chloride 0 9 % 100 mL IV piggyback   Intravenous Daily   • insulin lispro (HumaLOG) 100 units/mL subcutaneous injection 1-5 Units  1-5 Units Subcutaneous TID AC   • pantoprazole (PROTONIX) EC tablet 40 mg  40 mg Oral BID    and PTA meds:   Prior to Admission Medications   Prescriptions Last Dose Informant Patient Reported? Taking?    ARIPiprazole (ABILIFY) 5 mg tablet   No No   Sig: Take 1 tablet (5 mg total) by mouth daily at bedtime   LORazepam (ATIVAN) 0 5 mg tablet   No No   Sig: Take 0 5 tablets (0 25 mg total) by mouth daily at bedtime for 10 days   amLODIPine (NORVASC) 5 mg tablet   No No   Sig: Take 1 tablet (5 mg total) by mouth daily Do not start before October 27, 2022  atorvastatin (LIPITOR) 40 mg tablet  Self No No   Sig: Take 1 tablet (40 mg total) by mouth daily with dinner   carvedilol (COREG) 25 mg tablet   No No   Sig: Take 1 5 tablets (37 5 mg total) by mouth 2 (two) times a day with meals   docusate sodium (COLACE) 100 mg capsule   No No   Sig: Take 1 capsule (100 mg total) by mouth daily at bedtime   famotidine (PEPCID) 40 MG tablet  Self No No   Sig: Take 1 tablet (40 mg total) by mouth 2 (two) times a day   melatonin 5 MG TABS   No No   Sig: Take 1 tablet (5 mg total) by mouth daily at bedtime   mirtazapine (REMERON) 45 MG tablet   No No   Sig: Take 1 tablet (45 mg total) by mouth daily at bedtime   pantoprazole (PROTONIX) 40 mg tablet   No No   Sig: Take 1 tablet (40 mg total) by mouth 2 (two) times a day   torsemide (DEMADEX) 10 mg tablet   No No   Sig: Take 1 tablet (10 mg total) by mouth daily Do not start before October 27, 2022  warfarin (COUMADIN) 4 mg tablet   No No   Sig: TAKE 1 TABLET BY MOUTH EVERY DAY      Facility-Administered Medications: None     Allergies   Allergen Reactions   • Losartan Angioedema   • Aspirin Fatigue     Due to blood thinners     • Bactrim [Sulfamethoxazole-Trimethoprim]    • Eliquis [Apixaban] Other (See Comments)     Failed  Had embolic CVA   • Lisinopril      Felt bad, was OK with Zestril brand name  • Tramadol        Review of previous medical records was  completed  ROS limited given that patient was not willing to answer all questions    Review of Systems   Unable to perform ROS: Mental status change   Constitutional: Negative for chills and fever  HENT: Negative for ear pain and sore throat  Eyes: Negative for pain and visual disturbance     Respiratory: Negative for cough and shortness of breath  Cardiovascular: Negative for chest pain and palpitations  Gastrointestinal: Negative for abdominal pain and vomiting  Genitourinary: Negative for dysuria and hematuria  Musculoskeletal: Positive for arthralgias  Negative for back pain  Noted pain in his Bilat LE   Skin: Negative for color change and rash  Neurological: Negative for dizziness, seizures, syncope, light-headedness, numbness and headaches  All other systems reviewed and are negative  OBJECTIVE     Patient ID: Mirella Busby is a 58 y o  male  Vitals:   Vitals:    22 0739 22 0800 22 1143 22 1200   BP: 131/81  109/76    BP Location: Right arm  Right arm    Pulse: 90  102    Resp: 20  (!) 25    Temp: 99 1 °F (37 3 °C)  99 1 °F (37 3 °C)    TempSrc: Oral  Oral    SpO2: 97% 97% 97% 96%   Weight:       Height:          Body mass index is 28 63 kg/m²  Intake/Output Summary (Last 24 hours) at 2022 1423  Last data filed at 2022 1200  Gross per 24 hour   Intake 2909 17 ml   Output 825 ml   Net 2084 17 ml       Temperature:   Temp (24hrs), Av 4 °F (36 9 °C), Min:97 3 °F (36 3 °C), Max:99 1 °F (37 3 °C)    Temperature: 99 1 °F (37 3 °C)    Invasive Devices: Invasive Devices  Report    Peripheral Intravenous Line  Duration           Peripheral IV 22 Left Antecubital 1 day    Peripheral IV 22 Distal;Left;Ventral (anterior) Forearm <1 day          Drain  Duration           Urethral Catheter Latex 16 Fr  <1 day                Physical Exam  Constitutional:       Appearance: Normal appearance  HENT:      Nose: Nose normal       Mouth/Throat:      Mouth: Mucous membranes are moist       Pharynx: Oropharynx is clear  Eyes:      Extraocular Movements: Extraocular movements intact and EOM normal       Pupils: Pupils are equal, round, and reactive to light  Skin:     General: Skin is warm     Neurological:      Mental Status: He is alert and oriented to person, place, and time       Motor: Weakness (bilteral LE) present  Deep Tendon Reflexes:      Reflex Scores:       Tricep reflexes are 2+ on the right side and 2+ on the left side  Bicep reflexes are 2+ on the right side and 2+ on the left side  Brachioradialis reflexes are 2+ on the right side and 2+ on the left side  Patellar reflexes are 2+ on the right side and 2+ on the left side  Achilles reflexes are 2+ on the right side and 2+ on the left side  Psychiatric:         Mood and Affect: Affect is flat  Speech: Speech is delayed  Behavior: Behavior is withdrawn  Cognition and Memory: Memory is impaired  Neurologic Exam     Mental Status   Oriented to person, place, and time  Oriented to country, city and area  Level of consciousness: alert  Able to perform simple calculations  Able to name object  Able to read  Able to repeat  Normal comprehension  Able to perform math, name the president, and understand reason for admission    Masked facies appreciated     Cranial Nerves   Cranial nerves II through XII intact  CN II   Visual acuity: normal    CN III, IV, VI   Pupils are equal, round, and reactive to light  Extraocular motions are normal      CN VII   Facial expression full, symmetric       CN VIII   CN VIII normal      CN IX, X   CN IX normal    CN X normal      CN XI   CN XI normal      CN XII   CN XII normal      Motor Exam   Overall muscle tone: increased  Right arm pronator drift: absent  Strength 5/5 bilaterally UE and 1/5 in bilaterally LE possibly limited due to patient effort    Noted increased tone in left upper extremity with the tremors appreciated  No increased tone in right upper extremity when contralateral augmentation when was done  No increased tone was appreciated in bilateral lower extremities     Sensory Exam   Light touch normal      Noxious stimuli appreciated in all 4 extremities     Gait, Coordination, and Reflexes     Tremor Resting tremor: present    Reflexes   Right brachioradialis: 2+  Left brachioradialis: 2+  Right biceps: 2+  Left biceps: 2+  Right triceps: 2+  Left triceps: 2+  Right patellar: 2+  Left patellar: 2+  Right achilles: 2+  Left achilles: 2+  Right : 2+  Left : 2+  Right plantar: normal  Left plantar: normal    Noted resting tremor appreciated that was in a circular motion about 3-4 hertz medium amplitude  No postural component appreciated        LABORATORY DATA     Labs: I have personally reviewed pertinent reports  and I have personally reviewed pertinent films in PACS  Results from last 7 days   Lab Units 11/04/22  0345 11/03/22  1621   WBC Thousand/uL 10 88* 14 91*   HEMOGLOBIN g/dL 12 7 17 5*   HEMATOCRIT % 39 3 53 6*   PLATELETS Thousands/uL 156 258   NEUTROS PCT % 86* 87*   MONOS PCT % 7 6      Results from last 7 days   Lab Units 11/04/22  0818 11/04/22  0000 11/03/22  2300 11/03/22  1621   POTASSIUM mmol/L 3 4* 3 1* 3 1* 2 8*   CHLORIDE mmol/L 117* 117* 117* 109*   CO2 mmol/L 27 28 28 30   BUN mg/dL 59* 64* 73* 77*   CREATININE mg/dL 1 69* 1 88* 2 23* 2 60*   CALCIUM mg/dL 8 3 8 5 8 9 10 2*   ALK PHOS U/L  --  73  --  119*   ALT U/L  --  62  --  85*   AST U/L  --  230*  --  333*     Results from last 7 days   Lab Units 11/04/22  0000 11/03/22  1621   MAGNESIUM mg/dL 2 9* 3 0*          Results from last 7 days   Lab Units 11/04/22  0818 11/04/22  0451 11/03/22  1621   INR  9 33* 8 28* 6 02*   PTT seconds  --  52*  --      Results from last 7 days   Lab Units 11/04/22  0000   LACTIC ACID mmol/L 1 5           IMAGING & DIAGNOSTIC TESTING     Radiology Results: I have personally reviewed pertinent reports  and I have personally reviewed pertinent films in PACS  CT head without contrast   Final Result by Sadiq Sharpe MD (11/03 1930)      No acute intracranial abnormality  Encephalomalacia involving the right occipital lobe and bilateral cerebelli    Grossly stable appearance of periventricular hypoattenuation compared to MRI brain 8/29/2022, likely representing chronic microvascular    ischemic changes  The study was marked in [facility] for immediate notification  Workstation performed: [id number]         XR chest 1 view portable   ED Interpretation by Kadi Sanchez MD (11/03 1902)   Status post sternotomy  Loop recorder in place  No acute cardiopulmonary disease process on my interpretation  Final Result by Nate Steele MD (11/04 0557)      No acute cardiopulmonary disease  Workstation performed: [id number]             Other Diagnostic Testing: I have personally reviewed pertinent reports  and I have personally reviewed pertinent films in PACS    ACTIVE MEDICATIONS     Current Facility-Administered Medications   Medication Dose Route Frequency   • ceftriaxone (ROCEPHIN) 2 g/50 mL in dextrose IVPB  2,000 mg Intravenous Q24H   • dextrose 5 % infusion  125 mL/hr Intravenous Continuous   • folic acid 1 mg, thiamine (VITAMIN B1) 100 mg in sodium chloride 0 9 % 100 mL IV piggyback   Intravenous Daily   • insulin lispro (HumaLOG) 100 units/mL subcutaneous injection 1-5 Units  1-5 Units Subcutaneous TID AC   • pantoprazole (PROTONIX) EC tablet 40 mg  40 mg Oral BID       Prior to Admission medications    Medication Sig Start Date End Date Taking?  Authorizing Provider   amLODIPine (NORVASC) 5 mg tablet Take 1 tablet (5 mg total) by mouth daily Do not start before October 27, 2022  10/27/22   Tonny López MD   ARIPiprazole (ABILIFY) 5 mg tablet Take 1 tablet (5 mg total) by mouth daily at bedtime 10/31/22 11/30/22  Simone Kawasaki, MD   atorvastatin (LIPITOR) 40 mg tablet Take 1 tablet (40 mg total) by mouth daily with dinner 6/30/22   Cj Childs MD   carvedilol (COREG) 25 mg tablet Take 1 5 tablets (37 5 mg total) by mouth 2 (two) times a day with meals 10/26/22   Tonny López MD   docusate sodium (COLACE) 100 mg capsule Take 1 capsule (100 mg total) by mouth daily at bedtime 10/26/22   Nathalia Castañeda MD   famotidine (PEPCID) 40 MG tablet Take 1 tablet (40 mg total) by mouth 2 (two) times a day 8/30/22   Mally Lau MD   LORazepam (ATIVAN) 0 5 mg tablet Take 0 5 tablets (0 25 mg total) by mouth daily at bedtime for 10 days 10/26/22 11/5/22  Nathalia Castañeda MD   melatonin 5 MG TABS Take 1 tablet (5 mg total) by mouth daily at bedtime 10/26/22   Nathalia Castañeda MD   mirtazapine (REMERON) 45 MG tablet Take 1 tablet (45 mg total) by mouth daily at bedtime 10/31/22 11/30/22  Maryuri Gibbons MD   pantoprazole (PROTONIX) 40 mg tablet Take 1 tablet (40 mg total) by mouth 2 (two) times a day 10/26/22   Nathalia Castañeda MD   torsemide [facility]) 10 mg tablet Take 1 tablet (10 mg total) by mouth daily Do not start before October 27, 2022  10/27/22   Nathalia Castañeda MD   warfarin (COUMADIN) 4 mg tablet TAKE 1 TABLET BY MOUTH EVERY DAY 9/20/22   Mally Lau MD         CODE STATUS & ADVANCED DIRECTIVES     Code Status: Level 3 - DNAR and DNI  Advance Directive and Living Will:      Power of :    POLST:        VTE Pharmacologic Prophylaxis: as per primary team  VTE Mechanical Prophylaxis: sequential compression device    ==  [facility] [address] Neurology Residency, PGY-2

## 2022-11-04 NOTE — ASSESSMENT & PLAN NOTE
-history of antiphospholipid antibody on PTA Coumadin  -has supratherapeutic INR; holding warfarin  -had ischemic CVA despite being on Eliquis in the past

## 2022-11-04 NOTE — ASSESSMENT & PLAN NOTE
-previous admission from the end of September until the end of October at [facility] in which he was hospitalized for over a month for severe anxiety and inability to care for self  -per sister reportedly was able to ambulate prior to the hospitalization but after the hospitalization patient needing assistance with ambulation at home  -during hospitalization there patient developed hyponatremia during his course of stay and Zoloft that had been started was stopped  -placed on mirtazapine as well as Abilify melatonin and Ativan on discharge  Plan  > hold medications besides Abilify to avoid causing CNS depression given patient's or any fatigued state  > consider Psychiatry consultation

## 2022-11-04 NOTE — H&P
[address] [facility]  H&P- Raissa Lucero 1960, 58 y o  male MRN: [id number]  Unit/Bed#: [facility] 408-01 Encounter: [id number]  Primary Care Provider: Felipe Wilson MD   Date and time admitted to hospital: 11/3/2022  3:28 PM    * Rhabdomyolysis  Assessment & Plan  -CPK > 7000  -acute kidney injury with creatinine up to 2 5 from a normal creatinine recently  -noted hypernatremia with sodium 151  -in setting of minimal ambulation and has been going up the stairs by crawling in using his buttocks  -given 2 L normal saline in the ER  Plan  > admit to medicine on telemetry  Given hypernatremia with sodium 151 will need to hold off additional aggressive IV fluids and switch to D5W at 75 cc/hour with checking of the sodium every 6 hours  Monitor sodium every 4 hours  Consult Nephrology  > Neuro checks q 4 hours, fall precautions  > concern with patient's ability to swallow and as such will keep NPO and consult speech for formal swallow evaluation  > daily labs    Sepsis ([facility] [address] )  Assessment & Plan  -WBC 14 K, heart rate 114,  lactic acid 2 3  -chest x-ray with no obvious focal infiltrate; daughter reports diffuse weakness and at times patient coughing after eating  -urine with foul odor-awaiting UA  Plan  > infectious workup with blood cultures drawn  Start empiric ceftriaxone for possible UTI and will need to assess UA  Another potential source is developing aspiration pneumonia but no infiltrate seen on my read of chest x-ray  DAYAN (acute kidney injury) ([address] )  Assessment & Plan  -see rhabdo myolysis section    Hypernatremia  Assessment & Plan  -sodium 151, DAYAN, rhabdo  -initial thought process was this was a hypovolemic hypernatremia  -CT Head: No acute intracranial abnormality  Encephalomalacia involving the right occipital lobe and bilateral cerebelli    Grossly stable appearance of periventricular hypoattenuation compared to MRI brain 8/29/2022, likely representing chronic microvascular ischemic changes  Plan  > as above in rhabdo section  Supratherapeutic INR  Assessment & Plan  -INR 6 02-has history of antiphospholipid syndrome and AFib  -no evidence of bleeding  Plan  > as INR mildly elevated and no source of bleeding, will hold off giving any reversal agents of supratherapeutic INR especially given patient's history of antiphospholipid syndrome and history of 3 previous CVA as well as having ischemic CVAs in the past despite being on Eliquis  > once INR therapeutic, will need warfarin restarted   > daily INR    CVA (cerebral vascular accident) ([facility] [address] )  Assessment & Plan  -Hx CVA x 3 in setting of antiphospholipid syndrome  -on PTA warfarin although noted supratherapeutic INR  -CT head in ER: No acute intracranial abnormality  Encephalomalacia involving the right occipital lobe and bilateral cerebelli  Grossly stable appearance of periventricular hypoattenuation compared to MRI brain 8/29/2022, likely representing chronic microvascular ischemic changes  -on exam answers questions in a very low voice when asked, noted left upper extremity tremor that remains constant with movement, diffuse fatigue of bilateral lower extremities  Plan  > neurology consultation  Holding PTA warfarin given supratherapeutic INR, neuro checks q 4 hours    Antiphospholipid antibody with hypercoagulable state ([facility] [address] )  Assessment & Plan  -history of antiphospholipid antibody on PTA Coumadin  -has supratherapeutic INR; holding warfarin  -had ischemic CVA despite being on Eliquis in the past      Atrial fibrillation ([facility] [address] )  Assessment & Plan  -on PTA carvedilol and warfarin  Plan  > continue carvedilol    Hold warfarin given supratherapeutic INR    SHIMON (generalized anxiety disorder)  Assessment & Plan  -previous admission from the end of September until the end of October at [facility] in which he was hospitalized for over a month for severe anxiety and inability to care for self  -per sister reportedly was able to ambulate prior to the hospitalization but after the hospitalization patient needing assistance with ambulation at home  -during hospitalization there patient developed hyponatremia during his course of stay and Zoloft that had been started was stopped  -placed on mirtazapine as well as Abilify melatonin and Ativan on discharge  Plan  > hold medications besides Abilify to avoid causing CNS depression given patient's or any fatigued state  > consider Psychiatry consultation        VTE Prophylaxis: Pharmacologic VTE Prophylaxis contraindicated due to Supratherpeutic INR  / sequential compression device   Code Status: Level 3 - DNAR and DNI confirmed via telephone with patient's sister Aurelia Ford who patient lives with and who takes care of patient      Anticipated Length of Stay:  Patient will be admitted on an Inpatient basis with an anticipated length of stay of  > 2 midnights  Justification for Hospital Stay: Please see detailed plans noted above  Chief Complaint:     -diffuse fatigue, decreased appetite    History of Present Illness:  Mateo Burgos is a 58 y o  male who has past medical history significant for CVA x3, antiphospholipid syndrome on PTA warfarin, thoracic aortic aneurysm, cognitive impairment, severe anxiety who presented to [facility] ER on the evening of 11/3 with worsening fatigue as well as decreased appetite  History obtained from speaking with patient's sister at home Aurelia Ford who reports that patient was initially hospitalized for over a month from late September until late October at [place] for episodes of severe anxiety  She reports patient had some of the symptoms before he went into the hospital at [facility] but that when he was discharged about a week ago came out even more fatigued  During that hospitalization patient was found to have hyponatremia that they attributed to patient's being started on sertraline    On discharge patient was placed on Abilify, Ativan, mirtazapine and melatonin  Upon coming back to house with his sister Niko Veliz where he lives, patient has been even more fatigued than normal and is eating very little  She reports that patient has been crawling up and down the stairs because he has been so fatigued  She does report the patient has a history of 3 strokes in the past the patient has some baseline cognitive impairment from this        Review of Systems:  Cannot be reliably completed given patient's underlying cognitive issues as well as severely fatigued state    Past Medical and Surgical History:   Past Medical History:   Diagnosis Date   • Aneurysm of thoracic aorta     last assessed 11/20/17   • Anxiety     currently on no meds   • Arthritis    • Bilateral inguinal hernia    • Cardiac disease    • Cognitive impairment    • CVA (cerebral vascular accident) ([facility] [address] )    • Depression    • GERD (gastroesophageal reflux disease)    • Hearing loss of aging    • Heart disease    • Hyperlipidemia    • Hypertension    • Irritable bowel syndrome    • Kidney stone    • Obesity    • Occasional tremors     left arm since stroke   • Palpitations    • Panic attack    • PONV (postoperative nausea and vomiting)     and headache x 3 days   • Psychiatric illness    • Sciatica     right and left  side   • Shortness of breath     on exertion   • Sleep apnea     is not using a CPAP   • Sleep difficulties    • Spitting up blood 05/21/2021    Resolved   • Status post placement of implantable loop recorder    • Stroke ([facility] [address] ) 11/2014   • Stroke ([facility] ) 03/2021   • TIA (transient ischemic attack)      Past Surgical History:   Procedure Laterality Date   • AORTA SURGERY      thoracic - aneurysmorrhaphy   • APPENDECTOMY     • ASCENDING AORTIC ANEURYSM REPAIR      resolved 8/19/15   • CARDIAC LOOP RECORDER     • CHOLECYSTECTOMY      laparoscopic   • COLONOSCOPY     • CYSTOSCOPY      with removal of object- stent removal   • KNEE ARTHROSCOPY Right 1996    with medial meniscus repair   • LITHOTRIPSY      renal   • ORTHOPEDIC SURGERY     • AR FRAGMENT KIDNEY STONE/ ESWL Left 5/17/2018    Procedure: LITHROTRIPSY EXTRACORPORAL SHOCKWAVE (ESWL);   Surgeon: Benjy Cruz MD;  Location: [facility]  MAIN OR;  Service: Urology   • [address] Bilateral 12/9/2021    Procedure: ROBOTIC REPAIR HERNIA INGUINAL;  Surgeon: Tarsha Medina MD;  Location: [facility] Main OR;  Service: General   • THUMB ARTHROSCOPY Right 1976    ligament repair   • UPPER GASTROINTESTINAL ENDOSCOPY         Meds/Allergies:  Medications Prior to Admission   Medication Sig Dispense Refill Last Dose   • amLODIPine (NORVASC) 5 mg tablet Take 1 tablet (5 mg total) by mouth daily Do not start before October 27, 2022  30 tablet 0    • ARIPiprazole (ABILIFY) 5 mg tablet Take 1 tablet (5 mg total) by mouth daily at bedtime 30 tablet 2    • atorvastatin (LIPITOR) 40 mg tablet Take 1 tablet (40 mg total) by mouth daily with dinner 90 tablet 1    • carvedilol (COREG) 25 mg tablet Take 1 5 tablets (37 5 mg total) by mouth 2 (two) times a day with meals 45 tablet 0    • docusate sodium (COLACE) 100 mg capsule Take 1 capsule (100 mg total) by mouth daily at bedtime 30 capsule 0    • famotidine (PEPCID) 40 MG tablet Take 1 tablet (40 mg total) by mouth 2 (two) times a day 90 tablet 1    • LORazepam (ATIVAN) 0 5 mg tablet Take 0 5 tablets (0 25 mg total) by mouth daily at bedtime for 10 days  0    • melatonin 5 MG TABS Take 1 tablet (5 mg total) by mouth daily at bedtime 30 tablet 0    • mirtazapine (REMERON) 45 MG tablet Take 1 tablet (45 mg total) by mouth daily at bedtime 30 tablet 2    • pantoprazole (PROTONIX) 40 mg tablet Take 1 tablet (40 mg total) by mouth 2 (two) times a day 60 tablet 0    • torsemide (DEMADEX) 10 mg tablet Take 1 tablet (10 mg total) by mouth daily Do not start before October 27, 2022  30 tablet 0    • warfarin (COUMADIN) 4 mg tablet TAKE 1 TABLET BY MOUTH EVERY DAY 90 tablet 1 Allergies: Allergies   Allergen Reactions   • Losartan Angioedema   • Aspirin Fatigue     Due to blood thinners     • Bactrim [Sulfamethoxazole-Trimethoprim]    • Eliquis [Apixaban] Other (See Comments)     Failed  Had embolic CVA   • Lisinopril      Felt bad, was OK with Zestril brand name     • Tramadol        History:  Marital Status:      Substance Use History:   Social History     Substance and Sexual Activity   Alcohol Use Not Currently    Comment: very rare in past     Social History     Tobacco Use   Smoking Status Never Smoker   Smokeless Tobacco Never Used   Tobacco Comment    no second hand smoke exposure     Social History     Substance and Sexual Activity   Drug Use Never       Family History:  Family History   Problem Relation Age of Onset   • Diverticulitis Mother         of colon   • Nephrolithiasis Mother    • Emphysema Father    • Nephrolithiasis Sister    • Heart attack Maternal Grandfather 72   • Breast cancer Paternal Grandmother    • Lung cancer Paternal Grandfather    • Cancer Family    • Coronary artery disease Family    • Diabetes Family         sibling   • Hypertension Family         sibling   • Hernia Family         sibling       Physical Exam:     Vitals:   Blood Pressure: 113/71 (11/04/22 0300)  Pulse: 97 (11/04/22 0300)  Temperature: 98 9 °F (37 2 °C) (11/04/22 0300)  Temp Source: Oral (11/04/22 0300)  Respirations: 18 (11/03/22 2230)  Height: 5' 8" (172 7 cm) (11/03/22 2230)  Weight - Scale: 85 2 kg (187 lb 13 3 oz) (11/03/22 2230)  SpO2: 97 % (11/04/22 0300)    Constitutional:  Alert, answers questions when prompted, appears chronically ill at baseline  appearance  Eyes:  EOMI, No scleral icterus   HENT:   oropharynx dry, external ears normal, external nose normal   Respiratory:  No respiratory distress, no wheezing   Cardiovascular:  Tachycardia, no murmurs   GI:  Soft, nondistended, no guarding   :  [place] catheter in place, foul-smelling urine  Musculoskeletal:  1+ bilateral lower extremity edema  Integument:  no jaundice  Neurologic:  Alert &awake, answers questions when prompted but in a very low voice, no obvious cranial nerve deficit, noted left upper extremity tremor that does not improve with movement, difficult to appreciate any difference in strength between bilateral upper extremities  Diffuse fatigable lower extremities but is able to wiggle his toes when asked and reports normal sensation        Lab Results: I have personally reviewed pertinent reports  Results from last 7 days   Lab Units 11/03/22  1621   WBC Thousand/uL 14 91*   HEMOGLOBIN g/dL 17 5*   HEMATOCRIT % 53 6*   PLATELETS Thousands/uL 258   NEUTROS PCT % 87*   LYMPHS PCT % 6*   MONOS PCT % 6   EOS PCT % 0     Results from last 7 days   Lab Units 11/03/22  2300 11/03/22  1621   POTASSIUM mmol/L 3 1* 2 8*   CHLORIDE mmol/L 117* 109*   CO2 mmol/L 28 30   BUN mg/dL 73* 77*   CREATININE mg/dL 2 23* 2 60*   CALCIUM mg/dL 8 9 10 2*   ALK PHOS U/L  --  119*   ALT U/L  --  85*   AST U/L  --  333*     Results from last 7 days   Lab Units 11/03/22  1621   INR  6 02*         Imaging: I have personally reviewed pertinent reports  CT head without contrast    Result Date: 11/3/2022  Narrative: CT BRAIN - WITHOUT CONTRAST INDICATION:   AMS, weakness  COMPARISON:  CT head 4/19/2022, MRI brain 8/29/2022 TECHNIQUE:  CT examination of the brain was performed  In addition to axial images, sagittal and coronal 2D reformatted images were created and submitted for interpretation  Radiation dose length product (DLP) for this visit:  882 mGy-cm   This examination, like all CT scans performed in the [facility], was performed utilizing techniques to minimize radiation dose exposure, including the use of iterative reconstruction and automated exposure control  IMAGE QUALITY:  Diagnostic  FINDINGS: PARENCHYMA:  No intracranial mass, mass effect or midline shift  No CT signs of acute infarction    No acute parenchymal hemorrhage  Again seen is focal encephalomalacia and gliosis involving the right occipital lobe and bilateral cerebelli  Again seen are chronic lacunar infarcts in bilateral basal ganglia  Periventricular moderate patchy low-attenuation is again seen, grossly unchanged in extent compared to prior MRI brain 8/29/2022 VENTRICLES AND EXTRA-AXIAL SPACES:  Normal for the patient's age  VISUALIZED ORBITS AND PARANASAL SINUSES:  Unremarkable  CALVARIUM AND EXTRACRANIAL SOFT TISSUES:  Normal      Impression: No acute intracranial abnormality  Encephalomalacia involving the right occipital lobe and bilateral cerebelli  Grossly stable appearance of periventricular hypoattenuation compared to MRI brain 8/29/2022, likely representing chronic microvascular ischemic changes  The study was marked in [facility] for immediate notification  Workstation performed: [id number]     US thyroid    Result Date: 10/10/2022  Narrative: THYROID ULTRASOUND INDICATION:    depressed TSH  COMPARISON:  CT chest 8/8/2022 TECHNIQUE:   Ultrasound of the thyroid was performed with a high frequency linear transducer in transverse and sagittal planes including volumetric imaging sweeps as well as traditional still imaging technique  FINDINGS:  Normal homogeneous smooth echotexture  Right lobe: 4 3 x 1 9 x 2 0 cm  Volume 7 6 mL Left lobe:  3 8 x 1 6 x 1 8 cm  Volume 5 5 mL Isthmus: 0 3  cm  No thyroid nodules are demonstrated  Impression: Normal examination  Reference: ACR Thyroid Imaging, Reporting and Data System (TI-RADS): White Paper of the [employer]  J AM Nile Radiol 1467;18:820-536  (additional recommendations based on American Thyroid Association 2015 guidelines ) Workstation performed: [id number]     US right upper quadrant    Result Date: 10/12/2022  Narrative: RIGHT UPPER QUADRANT ULTRASOUND INDICATION:     hyperbilirubinemia  COMPARISON:  CT abdomen and pelvis 7/28/2022   TECHNIQUE:   Real-time ultrasound of the right upper quadrant was performed with a curvilinear transducer with both volumetric sweeps and still imaging techniques  FINDINGS: PANCREAS:  Visualized portions of the pancreas are within normal limits  AORTA AND IVC:  Visualized portions are normal for patient age  LIVER: Size:  Within normal range  The liver measures 13 1 cm in the midclavicular line  Contour:  Surface contour is smooth  Parenchyma:  Echogenicity and echotexture are within normal limits  No liver mass identified  Limited imaging of the main portal vein shows it to be patent and hepatopetal   BILIARY: Patient has undergone cholecystectomy  No intrahepatic biliary dilatation  CBD is not well visualized and measures 7 0 mm near the mathieu with tapering distally  No choledocholithiasis  KIDNEY: Right kidney measures 9 9 x 6 2 x 5 2 cm  Volume 165 1 mL Kidney within normal limits  ASCITES:   None  Impression: No acute process identified  Workstation performed: [id number]     Echo complete w/ contrast if indicated    Result Date: 10/11/2022  Narrative: •  Left Ventricle: Left ventricular cavity size is normal  Wall thickness is moderately increased  There is moderate concentric hypertrophy  The left ventricular ejection fraction is 63%  Systolic function is normal  Wall motion is normal  Diastolic function is mildly abnormal, consistent with grade I (abnormal) relaxation  •  Aortic Valve: The aortic valve is bicuspid  The leaflets are mildly thickened  The leaflets are mildly calcified  There is mildly reduced mobility  There is mild stenosis  The aortic valve peak velocity is 2 94 m/s  •  Tricuspid Valve: There is mild regurgitation  Total time for visit, including counseling/coordination of care: 60 minutes  Greater than 50% of this total time spent on direct patient counseling and coorination of care       Epic Records Reviewed as well as Records in Care Everywhere    ** Please Note: Dragon 360 Dictation voice to text software was used in the creation of this document   **

## 2022-11-04 NOTE — UTILIZATION REVIEW
NOTIFICATION OF INPATIENT ADMISSION   AUTHORIZATION REQUEST   SERVICING FACILITY:   [facility]  Address: [address] [place] [address], [address] [place] [address]  Tax ID: [id number]  NPI: [id number] ATTENDING PROVIDER:  Attending Name and NPI#: Marinaas Jason [[id number]]  Address: [address] [place] [address]  Phone: [phone]   ADMISSION INFORMATION:  [facility]patient [facility]  [address]  Place of Service Code: 21  Inpatient Admission Date/Time: 11/3/22 10:00 PM  Discharge Date/Time: No discharge date for patient encounter  Admitting Diagnosis Code/Description:  Rhabdomyolysis [M62 82]  Hypokalemia [E87 6]  Encephalomalacia [G93 89]  Anxiety [F41 9]  DAYAN (acute kidney injury) ([facility] [address] ) [N17 9]  Decreased oral intake [R63 8]  Generalized weakness [R53 1]  Ambulatory dysfunction [R26 2]  AMS (altered mental status) [R41 82]     UTILIZATION REVIEW CONTACT:  Luke Lemus Utilization   Network Utilization Review Department  Phone: [phone]  Fax: [phone]  Email: [email or web address]  Contact for approvals/pending authorizations, clinical reviews, and discharge  PHYSICIAN ADVISORY SERVICES:  Medical Necessity Denial & [facility] Review  Phone: [phone]  Fax: [phone]  Email: [email or web address]

## 2022-11-04 NOTE — CONSULTS
Medical Oncology/Hematology Consult Note  Mirella Busby, male, 58 y o , 1960,  [facility], [id number]     Assessment and Plan  1  Hx of APLS syndrome  2  Supratherapeutic INR  3  Rhabdomyolysis   4  AMS  5  DAYAN    Mr Carlos Galeana is a 58year old male with underlying APLS syndrome (most recently on Coumadin for [facility]), recurrent CVA, and other hx as outlined below who presented on 11/3/22 for evaluation of worsening fatigue  Patient's workup for APLS has been positive for lupus anticoagulant and cardiolipin antibodies in the past  Rechecking beta-2 glycoprotein levels and cardiolipin antibodies  Most recent INR is 9 33 this morning, but patient with no overt bleeding  Elevated INR likely 2/2 combination of poor nutrition and use of warfarin  We will do a mixing study to further evaluate this coagulopathy and give further recommendations based on that  Hold off on giving any reversal agents like vit K at this time  Coumadin and any other forms of anticoagulation to be held till then  Patient very dry appearing on exam  Rhabdomyolysis and DAYAN management as per primary team  AMS workup as per neuro recs  Outpatient follow up plan: TBD  Patient did see Kandace Christianson PA-C, once in office in October 2020  Communication with patient/family:  Patient and family to be updated after case is discussed with attending  Communication with team:  Primary team to be updated with recommendations after case is discussed with hematology/oncology attending  Case to be discussed with hematology/oncology attending  Reason for consultation: hx of APLS, recommendations regarding elevated INR    History of present illness:  Mirella Busby is a 58 y o  male with hx of CVA, APLS (on Coumadin), thoracic aortic aneurysm, and severe anxiety who presented to the hospital on 11/3/22 for evaluation of worsening fatigue and decreased appetite   Patient has had a prolonged hospitalization at [facility] from late September-late October for management of severe anxiety  Patient was sent into the hospital as his sister was concerned about patient having persistent fatigue  Admission labs notable for CK 7247, Na 149 > 150, Cr 2 60 (normal renal function at baseline)  Patient has been admitted for rhabdomyolysis and workup of AMS  CT brain showed no acute intracranial abnormality, showed encephalomalacia involving the right occipital lobe and bilateral cerebellar live; it showed grossly stable appearance of periventricular hypoattenuation compared to MRI brain from August 2022, deemed to be secondary to chronic microvascular ischemic changes  During this admission, patient was noted to have an elevated INR of 6 02 on arrival > 8 28 this morning > 9 33 on recheck  Patient with no overt bleeding episodes  Hematology team has been consulted for recommendations regarding reversal if needed for INR > 10  With regards to patient's antiphospholipid syndrome, patient has seen hematology in the OP setting, last seen in office on October 2020  Patient's March 2020 bloodwork was notable for lupus anticoagulant antibodies and cardiolipin antibodies  Negative for factor 5 Leiden mutation, prothrombin gene mutation, protein C or S deficiency  October 2020 blood work was negative for lupus anticoagulant but was positive for cardiolipin antibody  D-dimer was also elevated at 1 35  Patient was recommended to be on anticoagulation with Eliquis indefinitely at that time, given his history of recurrent strokes  Patient has been on Coumadin since May 2021 due to concern for Eliquis failure as patient was having strokes even on Eliquis  INR 8  No bleeding  Hx Antiphospholipid syndrome  Repeating INR at 0900 and holding Warfarin  Question for Hematology team: Normally would reverse if INR >10 without signs of bleeding but in a patient with APS what would be your thoughts? He has a hx of ischemic CVA x 3   Previously was on apixaban but had stroke and switched to warfarin  Review of Systems:    Review of Systems   Constitutional: Positive for fatigue  Negative for chills, fever and unexpected weight change  HENT: Negative for ear pain, mouth sores and sore throat  Eyes: Negative for pain and visual disturbance  Respiratory: Negative for cough and shortness of breath  Cardiovascular: Negative for chest pain and palpitations  Gastrointestinal: Negative for abdominal pain and vomiting  Genitourinary: Negative for dysuria and hematuria  Musculoskeletal: Negative for arthralgias and back pain  Skin: Negative for color change and rash  Neurological: Positive for weakness  Negative for seizures and syncope  Hematological: Negative for adenopathy  Does not bruise/bleed easily  Psychiatric/Behavioral: Negative for agitation and hallucinations  Slow to respond     All other systems reviewed and are negative  Oncology History:   Cancer Staging  No matching staging information was found for the patient  Oncology History    No history exists         Past Medical History:   Past Medical History:   Diagnosis Date   • Aneurysm of thoracic aorta     last assessed 11/20/17   • Anxiety     currently on no meds   • Arthritis    • Bilateral inguinal hernia    • Cardiac disease    • Cognitive impairment    • CVA (cerebral vascular accident) ([facility] [address] )    • Depression    • GERD (gastroesophageal reflux disease)    • Hearing loss of aging    • Heart disease    • Hyperlipidemia    • Hypertension    • Irritable bowel syndrome    • Kidney stone    • Obesity    • Occasional tremors     left arm since stroke   • Palpitations    • Panic attack    • PONV (postoperative nausea and vomiting)     and headache x 3 days   • Psychiatric illness    • Sciatica     right and left  side   • Shortness of breath     on exertion   • Sleep apnea     is not using a CPAP   • Sleep difficulties    • Spitting up blood 05/21/2021 Resolved   • Status post placement of implantable loop recorder    • Stroke [facility]) 11/2014   • Stroke ([facility] [address] ) 03/2021   • TIA (transient ischemic attack)        Past Surgical History:   Procedure Laterality Date   • AORTA SURGERY      thoracic - aneurysmorrhaphy   • APPENDECTOMY     • ASCENDING AORTIC ANEURYSM REPAIR      resolved 8/19/15   • CARDIAC LOOP RECORDER     • CHOLECYSTECTOMY      laparoscopic   • COLONOSCOPY     • CYSTOSCOPY      with removal of object- stent removal   • KNEE ARTHROSCOPY Right 1996    with medial meniscus repair   • LITHOTRIPSY      renal   • ORTHOPEDIC SURGERY     • MD FRAGMENT KIDNEY STONE/ ESWL Left 5/17/2018    Procedure: LITHROTRIPSY EXTRACORPORAL SHOCKWAVE (ESWL);   Surgeon: Yair Moy MD;  Location: [facility];  Service: Urology   • MD LAP,INGUINAL HERNIA REPR,INITIAL Bilateral 12/9/2021    Procedure: ROBOTIC REPAIR HERNIA INGUINAL;  Surgeon: Reinier Hutchinson MD;  Location: [facility];  Service: General   • THUMB ARTHROSCOPY Right 1976    ligament repair   • UPPER GASTROINTESTINAL ENDOSCOPY         Family History   Problem Relation Age of Onset   • Diverticulitis Mother         of colon   • Nephrolithiasis Mother    • Emphysema Father    • Nephrolithiasis Sister    • Heart attack Maternal Grandfather 72   • Breast cancer Paternal Grandmother    • Lung cancer Paternal Grandfather    • Cancer Family    • Coronary artery disease Family    • Diabetes Family         sibling   • Hypertension Family         sibling   • Hernia Family         sibling       Social History     Socioeconomic History   • Marital status:      Spouse name: None   • Number of children: None   • Years of education: None   • Highest education level: None   Occupational History   • Occupation: disabled     Tobacco Use   • Smoking status: Never Smoker   • Smokeless tobacco: Never Used   • Tobacco comment: no second hand smoke exposure   Vaping Use   • Vaping Use: Never used   Substance and Sexual Activity • Alcohol use: Not Currently     Comment: very rare in past   • Drug use: Never   • Sexual activity: Not Currently   Other Topics Concern   • None   Social History Narrative    Caffeine use    Completed some college     as per [facility]     Social Determinants of Health     Financial Resource Strain: Not on file   Food Insecurity: Not on file   Transportation Needs: Not on file   Physical Activity: Not on file   Stress: Not on file   Social Connections: Not on file   Intimate Partner Violence: Not on file   Housing Stability: Not on file         Current Facility-Administered Medications:   •  ceftriaxone (ROCEPHIN) 2 g/50 mL in dextrose IVPB, 2,000 mg, Intravenous, Q24H, Hal Thomas DO, Last Rate: 100 mL/hr at 11/04/22 0021, 2,000 mg at 11/04/22 0021  •  dextrose 5 % infusion, 250 mL/hr, Intravenous, Continuous, Hal Thomas DO, Last Rate: 250 mL/hr at 11/04/22 0611, 250 mL/hr at 59/60/08 5688  •  folic acid 1 mg, thiamine (VITAMIN B1) 100 mg in sodium chloride 0 9 % 100 mL IV piggyback, , Intravenous, Daily, Hal Thomas DO, Last Rate: 200 mL/hr at 11/04/22 0436, New Bag at 11/04/22 0436  •  insulin lispro (HumaLOG) 100 units/mL subcutaneous injection 1-5 Units, 1-5 Units, Subcutaneous, Q6H [address] **AND** Fingerstick Glucose (POCT), , , Q6H, Hal Thomas DO  •  pantoprazole (PROTONIX) EC tablet 40 mg, 40 mg, Oral, BID, Hal Thomas DO  •  potassium chloride 20 mEq IVPB (premix), 20 mEq, Intravenous, Once, Hal Thomas DO, Last Rate: 50 mL/hr at 11/04/22 0638, 20 mEq at 11/04/22 9039    Medications Prior to Admission   Medication   • amLODIPine (NORVASC) 5 mg tablet   • ARIPiprazole (ABILIFY) 5 mg tablet   • atorvastatin (LIPITOR) 40 mg tablet   • carvedilol (COREG) 25 mg tablet   • docusate sodium (COLACE) 100 mg capsule   • famotidine (PEPCID) 40 MG tablet   • LORazepam (ATIVAN) 0 5 mg tablet   • melatonin 5 MG TABS   • mirtazapine (REMERON) 45 MG tablet   • pantoprazole (PROTONIX) 40 mg tablet   • torsemide (DEMADEX) 10 mg tablet   • warfarin (COUMADIN) 4 mg tablet       Allergies   Allergen Reactions   • Losartan Angioedema   • Aspirin Fatigue     Due to blood thinners     • Bactrim [Sulfamethoxazole-Trimethoprim]    • Eliquis [Apixaban] Other (See Comments)     Failed  Had embolic CVA   • Lisinopril      Felt bad, was OK with Zestril brand name  • Tramadol          Physical Exam:     /71 (BP Location: Right arm)   Pulse 97   Temp 98 9 °F (37 2 °C) (Oral)   Resp 18   Ht 5' 8" (1 727 m)   Wt 85 4 kg (188 lb 4 4 oz)   SpO2 97%   BMI 28 63 kg/m²     Physical Exam  Vitals reviewed  Constitutional:       General: He is not in acute distress  Appearance: He is not ill-appearing, toxic-appearing or diaphoretic  Comments: Very lethargic appearing, able to maintain conversation and answer questions though   HENT:      Head: Normocephalic and atraumatic  Mouth/Throat:      Mouth: Mucous membranes are dry  Eyes:      General: No scleral icterus  Extraocular Movements: Extraocular movements intact  Conjunctiva/sclera: Conjunctivae normal    Cardiovascular:      Rate and Rhythm: Regular rhythm  Tachycardia present  Heart sounds: No murmur heard  No gallop  Pulmonary:      Effort: No respiratory distress  Breath sounds: Normal breath sounds  No wheezing, rhonchi or rales  Abdominal:      General: Bowel sounds are normal  There is no distension  Palpations: Abdomen is soft  There is no mass  Musculoskeletal:         General: No swelling, tenderness or deformity  Cervical back: Normal range of motion  Right lower leg: No edema  Left lower leg: No edema  Skin:     General: Skin is warm and dry  Findings: No erythema, lesion or rash  Neurological:      General: No focal deficit present  Mental Status: He is alert and oriented to person, place, and time        Comments: Symmetrical weakness of lower extremities,   UE weakness noted (R>L)  Constant tremor of LUE, chronic    Psychiatric:         Mood and Affect: Mood normal          Judgment: Judgment normal        Recent Results (from the past 48 hour(s))   CBC and differential    Collection Time: 11/03/22  4:21 PM   Result Value Ref Range    WBC 14 91 (H) 4 31 - 10 16 Thousand/uL    RBC 5 67 (H) 3 88 - 5 62 Million/uL    Hemoglobin 17 5 (H) 12 0 - 17 0 g/dL    Hematocrit 53 6 (H) 36 5 - 49 3 %    MCV 95 82 - 98 fL    MCH 30 9 26 8 - 34 3 pg    MCHC 32 6 31 4 - 37 4 g/dL    RDW 14 6 11 6 - 15 1 %    MPV 11 9 8 9 - 12 7 fL    Platelets 665 596 - 213 Thousands/uL    nRBC 0 /100 WBCs    Neutrophils Relative 87 (H) 43 - 75 %    Immat GRANS % 1 0 - 2 %    Lymphocytes Relative 6 (L) 14 - 44 %    Monocytes Relative 6 4 - 12 %    Eosinophils Relative 0 0 - 6 %    Basophils Relative 0 0 - 1 %    Neutrophils Absolute 12 99 (H) 1 85 - 7 62 Thousands/µL    Immature Grans Absolute 0 08 0 00 - 0 20 Thousand/uL    Lymphocytes Absolute 0 89 0 60 - 4 47 Thousands/µL    Monocytes Absolute 0 92 0 17 - 1 22 Thousand/µL    Eosinophils Absolute 0 01 0 00 - 0 61 Thousand/µL    Basophils Absolute 0 02 0 00 - 0 10 Thousands/µL   Comprehensive metabolic panel    Collection Time: 11/03/22  4:21 PM   Result Value Ref Range    Sodium 149 (H) 135 - 147 mmol/L    Potassium 2 8 (L) 3 5 - 5 3 mmol/L    Chloride 109 (H) 96 - 108 mmol/L    CO2 30 21 - 32 mmol/L    ANION GAP 10 4 - 13 mmol/L    BUN 77 (H) 5 - 25 mg/dL    Creatinine 2 60 (H) 0 60 - 1 30 mg/dL    Glucose 102 65 - 140 mg/dL    Calcium 10 2 (H) 8 3 - 10 1 mg/dL     (H) 5 - 45 U/L    ALT 85 (H) 12 - 78 U/L    Alkaline Phosphatase 119 (H) 46 - 116 U/L    Total Protein 8 5 (H) 6 4 - 8 4 g/dL    Albumin 3 7 3 5 - 5 0 g/dL    Total Bilirubin 1 96 (H) 0 20 - 1 00 mg/dL    eGFR 25 ml/min/1 73sq m   CK (with reflex to MB)    Collection Time: 11/03/22  4:21 PM   Result Value Ref Range    Total CK 7,247 (H) 39 - 308 U/L   HS Troponin 0hr (reflex protocol) Collection Time: 11/03/22  4:21 PM   Result Value Ref Range    hs TnI 0hr 88 (H) "Refer to ACS Flowchart"- see link ng/L   Magnesium    Collection Time: 11/03/22  4:21 PM   Result Value Ref Range    Magnesium 3 0 (H) 1 6 - 2 6 mg/dL   Ammonia    Collection Time: 11/03/22  4:21 PM   Result Value Ref Range    Ammonia <10 (L) 11 - 35 umol/L   Protime-INR    Collection Time: 11/03/22  4:21 PM   Result Value Ref Range    Protime 53 9 (H) 11 6 - 14 5 seconds    INR 6 02 (HH) 0 84 - 1 19   CKMB    Collection Time: 11/03/22  4:21 PM   Result Value Ref Range    CK-MB Index <1 0 0 0 - 2 5 %    CK-MB 35 3 (H) 0 0 - 5 0 ng/mL   ECG 12 lead    Collection Time: 11/03/22  4:42 PM   Result Value Ref Range    Ventricular Rate 99 BPM    Atrial Rate 94 BPM    OK Interval 150 ms    QRSD Interval 78 ms    QT Interval 314 ms    QTC Interval 402 ms    P Axis 109 degrees    QRS Axis 38 degrees    T Wave [place] 234 degrees   HS Troponin I 2hr    Collection Time: 11/03/22  6:16 PM   Result Value Ref Range    hs TnI 2hr 70 (H) "Refer to ACS Flowchart"- see link ng/L    Delta 2hr hsTnI -18 <20 ng/L   HS Troponin I 4hr    Collection Time: 11/03/22  8:12 PM   Result Value Ref Range    hs TnI 4hr 77 (H) "Refer to ACS Flowchart"- see link ng/L    Delta 4hr hsTnI -11 <20 ng/L   Basic metabolic panel    Collection Time: 11/03/22 11:00 PM   Result Value Ref Range    Sodium 151 (H) 135 - 147 mmol/L    Potassium 3 1 (L) 3 5 - 5 3 mmol/L    Chloride 117 (H) 96 - 108 mmol/L    CO2 28 21 - 32 mmol/L    ANION GAP 6 4 - 13 mmol/L    BUN 73 (H) 5 - 25 mg/dL    Creatinine 2 23 (H) 0 60 - 1 30 mg/dL    Glucose 151 (H) 65 - 140 mg/dL    Calcium 8 9 8 3 - 10 1 mg/dL    eGFR 30 ml/min/1 73sq m   Lactic Acid with Reflex STAT    Collection Time: 11/03/22 11:00 PM   Result Value Ref Range    LACTIC ACID 2 4 (HH) 0 5 - 2 0 mmol/L   Procalcitonin    Collection Time: 11/03/22 11:00 PM   Result Value Ref Range    Procalcitonin 0 29 (H) <=0 25 ng/ml   Comprehensive metabolic panel    Collection Time: 11/04/22 12:00 AM   Result Value Ref Range    Sodium 153 (H) 135 - 147 mmol/L    Potassium 3 1 (L) 3 5 - 5 3 mmol/L    Chloride 117 (H) 96 - 108 mmol/L    CO2 28 21 - 32 mmol/L    ANION GAP 8 4 - 13 mmol/L    BUN 64 (H) 5 - 25 mg/dL    Creatinine 1 88 (H) 0 60 - 1 30 mg/dL    Glucose 154 (H) 65 - 140 mg/dL    Calcium 8 5 8 3 - 10 1 mg/dL    Corrected Calcium 9 8 8 3 - 10 1 mg/dL     (H) 5 - 45 U/L    ALT 62 12 - 78 U/L    Alkaline Phosphatase 73 46 - 116 U/L    Total Protein 5 5 (L) 6 4 - 8 4 g/dL    Albumin 2 4 (L) 3 5 - 5 0 g/dL    Total Bilirubin 1 00 0 20 - 1 00 mg/dL    eGFR 37 ml/min/1 73sq m   Lactic acid, plasma    Collection Time: 11/04/22 12:00 AM   Result Value Ref Range    LACTIC ACID 1 5 0 5 - 2 0 mmol/L   Magnesium    Collection Time: 11/04/22 12:00 AM   Result Value Ref Range    Magnesium 2 9 (H) 1 6 - 2 6 mg/dL   Procalcitonin, Next Day AM Collection    Collection Time: 11/04/22 12:00 AM   Result Value Ref Range    Procalcitonin 0 24 <=0 25 ng/ml   TSH, 3rd generation with Free T4 reflex    Collection Time: 11/04/22 12:00 AM   Result Value Ref Range    TSH 3RD GENERATON 0 282 (L) 0 450 - 4 500 uIU/mL   T4, free    Collection Time: 11/04/22 12:00 AM   Result Value Ref Range    Free T4 1 56 (H) 0 76 - 1 46 ng/dL   Fingerstick Glucose (POCT)    Collection Time: 11/04/22  1:12 AM   Result Value Ref Range    POC Glucose 121 65 - 140 mg/dl   Sodium, urine, random    Collection Time: 11/04/22  1:13 AM   Result Value Ref Range    Sodium, Ur <5    Creatinine, urine, random    Collection Time: 11/04/22  1:13 AM   Result Value Ref Range    Creatinine, Ur 144 0 mg/dL   Osmolality, urine    Collection Time: 11/04/22  1:13 AM   Result Value Ref Range    Osmolality, Ur 568 250 - 900 mmol/KG   UA w Reflex to Microscopic w Reflex to Culture    Collection Time: 11/04/22  2:06 AM    Specimen: Urine   Result Value Ref Range    Color, UA Light Yellow     Clarity, UA Clear Specific Cylinder, UA 1 017 1 003 - 1 030    pH, UA 5 0 4 5, 5 0, 5 5, 6 0, 6 5, 7 0, 7 5, 8 0    Leukocytes, UA Negative Negative    Nitrite, UA Negative Negative    Protein, UA Trace (A) Negative mg/dl    Glucose, UA Negative Negative mg/dl    Ketones, UA Negative Negative mg/dl    Urobilinogen, UA <2 0 <2 0 mg/dl mg/dl    Bilirubin, UA Negative Negative    Occult Blood, UA Moderate (A) Negative   Urine Microscopic    Collection Time: 11/04/22  2:06 AM   Result Value Ref Range    RBC, UA 1-2 None Seen, 1-2 /hpf    WBC, UA 2-4 (A) None Seen, 1-2 /hpf    Epithelial Cells Occasional None Seen, Occasional /hpf    Bacteria, UA None Seen None Seen, Occasional /hpf    MUCUS THREADS Occasional (A) None Seen    Hyaline Casts, UA Innumerable (A) None Seen /lpf   APTT    Collection Time: 11/04/22  4:51 AM   Result Value Ref Range    PTT 52 (H) 23 - 37 seconds   Protime-INR    Collection Time: 11/04/22  4:51 AM   Result Value Ref Range    Protime 69 0 (H) 11 6 - 14 5 seconds    INR 8 28 (HH) 0 84 - 1 19   Osmolality-"If this is regarding a toxic alcohol, STOP  Test is not routinely indicated  Please consult medical  on call for further guidance "    Collection Time: 11/04/22  5:08 AM   Result Value Ref Range    Osmolality Serum 334 (H) 282 - 298 mmol/KG       CT head without contrast    Result Date: 11/3/2022  Narrative: CT BRAIN - WITHOUT CONTRAST INDICATION:   AMS, weakness  COMPARISON:  CT head 4/19/2022, MRI brain 8/29/2022 TECHNIQUE:  CT examination of the brain was performed  In addition to axial images, sagittal and coronal 2D reformatted images were created and submitted for interpretation  Radiation dose length product (DLP) for this visit:  882 mGy-cm   This examination, like all CT scans performed in the [facility], was performed utilizing techniques to minimize radiation dose exposure, including the use of iterative reconstruction and automated exposure control    IMAGE QUALITY: Diagnostic  FINDINGS: PARENCHYMA:  No intracranial mass, mass effect or midline shift  No CT signs of acute infarction  No acute parenchymal hemorrhage  Again seen is focal encephalomalacia and gliosis involving the right occipital lobe and bilateral cerebelli  Again seen are chronic lacunar infarcts in bilateral basal ganglia  Periventricular moderate patchy low-attenuation is again seen, grossly unchanged in extent compared to prior MRI brain 8/29/2022 VENTRICLES AND EXTRA-AXIAL SPACES:  Normal for the patient's age  VISUALIZED ORBITS AND PARANASAL SINUSES:  Unremarkable  CALVARIUM AND EXTRACRANIAL SOFT TISSUES:  Normal      Impression: No acute intracranial abnormality  Encephalomalacia involving the right occipital lobe and bilateral cerebelli  Grossly stable appearance of periventricular hypoattenuation compared to MRI brain 8/29/2022, likely representing chronic microvascular ischemic changes  The study was marked in [facility] for immediate notification  Workstation performed: [id number]     US thyroid    Result Date: 10/10/2022  Narrative: THYROID ULTRASOUND INDICATION:    depressed TSH  COMPARISON:  CT chest 8/8/2022 TECHNIQUE:   Ultrasound of the thyroid was performed with a high frequency linear transducer in transverse and sagittal planes including volumetric imaging sweeps as well as traditional still imaging technique  FINDINGS:  Normal homogeneous smooth echotexture  Right lobe: 4 3 x 1 9 x 2 0 cm  Volume 7 6 mL Left lobe:  3 8 x 1 6 x 1 8 cm  Volume 5 5 mL Isthmus: 0 3  cm  No thyroid nodules are demonstrated  Impression: Normal examination  Reference: ACR Thyroid Imaging, Reporting and Data System (TI-RADS): White Paper of the [employer]   J AM Nile Radiol 5402;18:362-397  (additional recommendations based on American Thyroid Association 2015 guidelines ) Workstation performed: [id number]     US right upper quadrant    Result Date: 10/12/2022  Narrative: RIGHT UPPER QUADRANT ULTRASOUND INDICATION:     hyperbilirubinemia  COMPARISON:  CT abdomen and pelvis 7/28/2022  TECHNIQUE:   Real-time ultrasound of the right upper quadrant was performed with a curvilinear transducer with both volumetric sweeps and still imaging techniques  FINDINGS: PANCREAS:  Visualized portions of the pancreas are within normal limits  AORTA AND IVC:  Visualized portions are normal for patient age  LIVER: Size:  Within normal range  The liver measures 13 1 cm in the midclavicular line  Contour:  Surface contour is smooth  Parenchyma:  Echogenicity and echotexture are within normal limits  No liver mass identified  Limited imaging of the main portal vein shows it to be patent and hepatopetal   BILIARY: Patient has undergone cholecystectomy  No intrahepatic biliary dilatation  CBD is not well visualized and measures 7 0 mm near the mathieu with tapering distally  No choledocholithiasis  KIDNEY: Right kidney measures 9 9 x 6 2 x 5 2 cm  Volume 165 1 mL Kidney within normal limits  ASCITES:   None  Impression: No acute process identified  Workstation performed: [id number]     Echo complete w/ contrast if indicated    Result Date: 10/11/2022  Narrative: •  Left Ventricle: Left ventricular cavity size is normal  Wall thickness is moderately increased  There is moderate concentric hypertrophy  The left ventricular ejection fraction is 63%  Systolic function is normal  Wall motion is normal  Diastolic function is mildly abnormal, consistent with grade I (abnormal) relaxation  •  Aortic Valve: The aortic valve is bicuspid  The leaflets are mildly thickened  The leaflets are mildly calcified  There is mildly reduced mobility  There is mild stenosis  The aortic valve peak velocity is 2 94 m/s  •  Tricuspid Valve: There is mild regurgitation  Labs and pertinent reports reviewed

## 2022-11-04 NOTE — ASSESSMENT & PLAN NOTE
-previous admission from the end of September until the end of October at [facility] in which he was hospitalized for over a month for severe anxiety and inability to care for self  -per sister reportedly was able to ambulate prior to the hospitalization but after the hospitalization patient needing assistance with ambulation at home  -during hospitalization there patient developed hyponatremia during his course of stay and Zoloft that had been started was stopped  -placed on mirtazapine as well as Abilify melatonin and Ativan on discharge  Plan  > hold medications besides Abilify to avoid causing CNS depression given patient's or any fatigued state  > consider Psychiatry consultation

## 2022-11-04 NOTE — APP STUDENT NOTE
Assessment/Plan    AMS  -discontinue abilify,     Rhabdomyolyis due to inactivity and decrease appetite/fluid intake    -aggressive IV rehydration with 1000ml NSS Bolus, 1000ml LR     Hypernatremia  -Consult neurology, administer D5W hypotonic solution with AM BMP lab draws  Monitor closely  Hypokalemia  -K supplementation oral solution      VTE Pharmacologic Prophylaxis: VTE Score: 3 Moderate Risk (Score 3-4) - Pharmacological DVT Prophylaxis Contraindicated  Sequential Compression Devices Ordered  Code Status: Level 1 - Full Code ***  Discussion with family: Updated  (sister) at bedside  Kailee Velazquez    Anticipated Length of Stay: Patient will be admitted on an inpatient basis with an anticipated length of stay of greater than 2 midnights secondary to AMS  Total Time for Visit, including Counseling / Coordination of Care: {Note Time:37126} Greater than 50% of this total time spent on direct patient counseling and coordination of care  Chief Complaint:    AMS    History of Present Illness:  David Dixon is a 58 y o  male with a PMH of CVA x2 (2014 and 2021), Thoracic aortic anuerysm, cardiac disease, Depression, HLD, HTN, sleep apnea, and psychiatric illness  Who presents with confusion  Per the patient he was transported to the ED via ambulance due to his sister believing him to be out of it and confused  The patients sister however did not accompany him to the hospital  The patient stated he was just sitting there and felt out of it  The patient also has a resting tremor for which he states has only gotten worse over the years  He stated its been about 7 years since it started, beginning in his wrist  He stated he has seen neurologists for this tremor but he was never diagnosed with any parkinson's  The patient was able to answer questions, albeit very slowly as he gathered his thought and spoke with a very soft and low tone       The patient denies any SOB, Chest pain, N/V/D, abdominal or lower extremity pain  Review of Systems:  Review of Systems   Constitutional: Positive for appetite change  Negative for chills, diaphoresis and fever  HENT: Negative  Eyes: Negative  Respiratory: Negative for cough, choking, chest tightness and shortness of breath  Cardiovascular: Negative for chest pain and leg swelling  Gastrointestinal: Negative for abdominal pain, diarrhea, nausea and vomiting  Endocrine: Negative  Skin: Negative  Neurological: Positive for tremors, weakness and numbness  Negative for dizziness and headaches         Past Medical and Surgical History:   Past Medical History:   Diagnosis Date   • Aneurysm of thoracic aorta     last assessed 11/20/17   • Anxiety     currently on no meds   • Arthritis    • Bilateral inguinal hernia    • Cardiac disease    • Cognitive impairment    • CVA (cerebral vascular accident) ([facility] [address] )    • Depression    • GERD (gastroesophageal reflux disease)    • Hearing loss of aging    • Heart disease    • Hyperlipidemia    • Hypertension    • Irritable bowel syndrome    • Kidney stone    • Obesity    • Occasional tremors     left arm since stroke   • Palpitations    • Panic attack    • PONV (postoperative nausea and vomiting)     and headache x 3 days   • Psychiatric illness    • Sciatica     right and left  side   • Shortness of breath     on exertion   • Sleep apnea     is not using a CPAP   • Sleep difficulties    • Spitting up blood 05/21/2021    Resolved   • Status post placement of implantable loop recorder    • Stroke ([facility] [address] ) 11/2014   • Stroke ([facility] [address] ) 03/2021   • TIA (transient ischemic attack)        Past Surgical History:   Procedure Laterality Date   • AORTA SURGERY      thoracic - aneurysmorrhaphy   • APPENDECTOMY     • ASCENDING AORTIC ANEURYSM REPAIR      resolved 8/19/15   • CARDIAC LOOP RECORDER     • CHOLECYSTECTOMY      laparoscopic   • COLONOSCOPY     • CYSTOSCOPY      with removal of object- stent removal   • KNEE ARTHROSCOPY Right 1996    with medial meniscus repair   • LITHOTRIPSY      renal   • ORTHOPEDIC SURGERY     • [place] FRAGMENT KIDNEY STONE/ ESWL Left 5/17/2018    Procedure: Nathalia Brandt SHOCKWAVE (ESWL); Surgeon: Foster Miranda MD;  [facility];  Service: Urology   • [place] Ame[address] Bilateral 12/9/2021    Procedure: ROBOTIC REPAIR HERNIA INGUINAL;  Surgeon: Pedro Sanchez MD;  Location: [facility];  Service: General   • THUMB ARTHROSCOPY Right 1976    ligament repair   • UPPER GASTROINTESTINAL ENDOSCOPY         Meds/Allergies:  Prior to Admission medications    Medication Sig Start Date End Date Taking?  Authorizing Provider   amLODIPine (NORVASC) 5 mg tablet Take 1 tablet (5 mg total) by mouth daily Do not start before October 27, 2022  10/27/22   Sarkis Thomas MD   ARIPiprazole (ABILIFY) 5 mg tablet Take 1 tablet (5 mg total) by mouth daily at bedtime 10/31/22 11/30/22  Henry Beckman MD   atorvastatin (LIPITOR) 40 mg tablet Take 1 tablet (40 mg total) by mouth daily with dinner 6/30/22   Don Spence MD   carvedilol (COREG) 25 mg tablet Take 1 5 tablets (37 5 mg total) by mouth 2 (two) times a day with meals 10/26/22   Sarkis Thomas MD   docusate sodium (COLACE) 100 mg capsule Take 1 capsule (100 mg total) by mouth daily at bedtime 10/26/22   Sarkis Thomas MD   famotidine (PEPCID) 40 MG tablet Take 1 tablet (40 mg total) by mouth 2 (two) times a day 8/30/22   Don Spence MD   LORazepam (ATIVAN) 0 5 mg tablet Take 0 5 tablets (0 25 mg total) by mouth daily at bedtime for 10 days 10/26/22 11/5/22  Sarkis Thomas MD   melatonin 5 MG TABS Take 1 tablet (5 mg total) by mouth daily at bedtime 10/26/22   Sarkis Thomas MD   mirtazapine (REMERON) 45 MG tablet Take 1 tablet (45 mg total) by mouth daily at bedtime 10/31/22 11/30/22  Henry Beckman MD   pantoprazole (PROTONIX) 40 mg tablet Take 1 tablet (40 mg total) by mouth 2 (two) times a day 10/26/22   Sarkis Thomas MD   torsemide (DEMADEX) 10 mg tablet Take 1 tablet (10 mg total) by mouth daily Do not start before October 27, 2022  10/27/22   Brandan Desai MD   warfarin (COUMADIN) 4 mg tablet TAKE 1 TABLET BY MOUTH EVERY DAY 9/20/22   Jatin Gutierrez MD     I have reviewed home medications using recent Epic encounter  Allergies: Allergies   Allergen Reactions   • Losartan Angioedema   • Aspirin Fatigue     Due to blood thinners     • Bactrim [Sulfamethoxazole-Trimethoprim]    • Eliquis [Apixaban] Other (See Comments)     Failed  Had embolic CVA   • Lisinopril      Felt bad, was OK with Zestril brand name     • Tramadol        Social History:  Marital Status:    Occupation:   Patient Pre-hospital Living Situation: Home  Patient Pre-hospital Level of Mobility: walks  Patient Pre-hospital Diet Restrictions: ***  Substance Use History:   Social History     Substance and Sexual Activity   Alcohol Use Not Currently    Comment: very rare in past     Social History     Tobacco Use   Smoking Status Never Smoker   Smokeless Tobacco Never Used   Tobacco Comment    no second hand smoke exposure     Social History     Substance and Sexual Activity   Drug Use Never       Family History:  {SL IP SLIM Notes [id number]    Physical Exam:     Vitals:   Blood Pressure: 113/62 (11/03/22 2230)  Pulse: (!) 113 (11/03/22 2230)  Temperature: 97 5 °F (36 4 °C) (11/03/22 2230)  Temp Source: Oral (11/03/22 2230)  Respirations: 18 (11/03/22 2230)  Height: 5' 8" (172 7 cm) (11/03/22 2230)  Weight - Scale: 85 2 kg (187 lb 13 3 oz) (11/03/22 2230)  SpO2: 95 % (11/03/22 2230)    Physical Exam ***    Additional Data:     Lab Results:  Results from last 7 days   Lab Units 11/03/22  1621   WBC Thousand/uL 14 91*   HEMOGLOBIN g/dL 17 5*   HEMATOCRIT % 53 6*   PLATELETS Thousands/uL 258   NEUTROS PCT % 87*   LYMPHS PCT % 6*   MONOS PCT % 6   EOS PCT % 0     Results from last 7 days   Lab Units 11/03/22  1621   SODIUM mmol/L 149*   POTASSIUM mmol/L 2 8*   CHLORIDE mmol/L 109*   CO2 mmol/L 30   BUN mg/dL 77*   CREATININE mg/dL 2 60*   ANION GAP mmol/L 10   CALCIUM mg/dL 10 2*   ALBUMIN g/dL 3 7   TOTAL BILIRUBIN mg/dL 1 96*   ALK PHOS U/L 119*   ALT U/L 85*   AST U/L 333*   GLUCOSE RANDOM mg/dL 102     Results from last 7 days   Lab Units 22  1621   INR  6 02*                   Imaging: {Radiology Review:46865}  CT head without contrast   Final Result by Mera Reddy MD ([id number])      No acute intracranial abnormality  Encephalomalacia involving the right occipital lobe and bilateral cerebelli  Grossly stable appearance of periventricular hypoattenuation compared to MRI brain 2022, likely representing chronic microvascular    ischemic changes  The study was marked in [facility] for immediate notification  Workstation performed: [id number]         XR chest 1 view portable   ED Interpretation by Vasquez Richardson MD ([id number])   Status post sternotomy  Loop recorder in place  No acute cardiopulmonary disease process on my interpretation  EKG and Other Studies Reviewed on Admission:   · EKG: {Interpretation of EK}    ** Please Note: This note has been constructed using a voice recognition system   **

## 2022-11-04 NOTE — QUICK NOTE
Repeat Na 153  Switch to 3cc/kg/hr D5W and recheck bmp at 0800 to monitor NA and then every 3 hours thereafter  Repeat INR 8  I evaluated patient again and no evidence of bleeding  Given his hx of Antiphospholipid syndrome, hesitant to reverse patient given that he has high ischemic CVA risk with 3 prior CVAs and has had CVA's despite being on eliquis in the past  As INR still less than 10 and no bleeding, will hold off reversing and repeat INR at 0900 to continue to monitor closely  We will consult Hematology for their opinion on this issue  Nephrology and Neurology have also been consulted on case and appreciate their recommendations on case  We have upgraded patient to SD level 2  Additionally, we will place a mcgregor catheter as patient will be soiled with urine if we do not place this and this will allow for accurate I&O in ill patient  Additionally, UA with out evidence of pyuria  We will keep Ceftriaxone on for now though in case of any possible developing infection in the lungs given concern for aspiration  Swallow evaluation ordered

## 2022-11-04 NOTE — ASSESSMENT & PLAN NOTE
-CPK > 7000  -acute kidney injury with creatinine up to 2 5 from a normal creatinine recently  -noted hypernatremia with sodium 151  -in setting of minimal ambulation and has been going up the stairs by crawling in using his buttocks  -given 2 L normal saline in the ER  Plan  > admit to medicine on telemetry  Given hypernatremia with sodium 151 will need to hold off additional aggressive IV fluids and switch to D5W at 75 cc/hour with checking of the sodium every 6 hours  Monitor sodium every 4 hours  Consult Nephrology    > Neuro checks q 4 hours, fall precautions  > concern with patient's ability to swallow and as such will keep NPO and consult speech for formal swallow evaluation  > daily labs

## 2022-11-04 NOTE — PROGRESS NOTES
[facility]  Progress Note - Jose Robbins 1960, 58 y o  male MRN: [id number]  Unit/Bed#: [facility] 408-01 Encounter: [id number]  Primary Care Provider: Adrian Gore MD   Date and time admitted to hospital: 11/3/2022  3:28 PM    * Rhabdomyolysis  Assessment & Plan  -CPK > 7000  -acute kidney injury with creatinine up to 2 5 from a normal creatinine recently  -noted hypernatremia with sodium 151  -in setting of minimal ambulation and has been going up the stairs by crawling in using his buttocks  -given 2 L normal saline in the ER  Plan  > admit to medicine on telemetry  Given hypernatremia with sodium 151 will need to hold off additional aggressive IV fluids and switch to D5W at 75 cc/hour with checking of the sodium every 6 hours  Monitor sodium every 4 hours  Consult Nephrology  > Neuro checks q 4 hours, fall precautions      Sepsis ([facility] [address] )  Assessment & Plan  -WBC 14 K, heart rate 114,  lactic acid 2 3  -chest x-ray with no obvious focal infiltrate; daughter reports diffuse weakness and at times patient coughing after eating  -urine with foul odor-awaiting UA  Plan  > infectious workup with blood cultures drawn  Start empiric ceftriaxone for possible UTI and will need to assess UA  Another potential source is developing aspiration pneumonia but no infiltrate seen on my read of chest x-ray  DAYAN (acute kidney injury) ([facility] [address] )  Assessment & Plan  -see rhabdo myolysis section    Hypernatremia  Assessment & Plan  -sodium 151, DAYAN, rhabdo  -initial thought process was this was a hypovolemic hypernatremia  -CT Head: No acute intracranial abnormality  Encephalomalacia involving the right occipital lobe and bilateral cerebelli  Grossly stable appearance of periventricular hypoattenuation compared to MRI brain 8/29/2022, likely representing chronic microvascular ischemic changes  Plan  > as above in rhabdo section      Supratherapeutic INR  Assessment & Plan  Increased increased to 9 has history of antiphospholipid syndrome and AFib  -no evidence of bleeding  Plan  > as INR mildly elevated and no source of bleeding, will hold off giving any reversal agents of supratherapeutic INR especially given patient's history of antiphospholipid syndrome and history of 3 previous CVA as well as having ischemic CVAs in the past despite being on Eliquis  > once INR therapeutic, will need warfarin restarted   > daily INR    CVA (cerebral vascular accident) ([facility] [address] )  Assessment & Plan  -Hx CVA x 3 in setting of antiphospholipid syndrome  -on PTA warfarin although noted supratherapeutic INR  -CT head in ER: No acute intracranial abnormality  Encephalomalacia involving the right occipital lobe and bilateral cerebelli  Grossly stable appearance of periventricular hypoattenuation compared to MRI brain 8/29/2022, likely representing chronic microvascular ischemic changes  -on exam answers questions in a very low voice when asked, noted left upper extremity tremor that remains constant with movement, diffuse fatigue of bilateral lower extremities  Plan  > neurology consultation  Holding PTA warfarin given supratherapeutic INR, neuro checks q 4 hours    Antiphospholipid antibody with hypercoagulable state ([facility] [address] )  Assessment & Plan  -history of antiphospholipid antibody on PTA Coumadin  -has supratherapeutic INR; holding warfarin  -had ischemic CVA despite being on Eliquis in the past      Atrial fibrillation ([facility] [address] )  Assessment & Plan  -on PTA carvedilol and warfarin  Plan  > continue carvedilol    Hold warfarin given supratherapeutic INR    SHIMON (generalized anxiety disorder)  Assessment & Plan  -previous admission from the end of September until the end of October at [facility] in which he was hospitalized for over a month for severe anxiety and inability to care for self  -per sister reportedly was able to ambulate prior to the hospitalization but after the hospitalization patient needing assistance with ambulation at home  -during hospitalization there patient developed hyponatremia during his course of stay and Zoloft that had been started was stopped  -placed on mirtazapine as well as Abilify melatonin and Ativan on discharge  Plan  > hold medications besides Abilify to avoid causing CNS depression given patient's or any fatigued state  > consider Psychiatry consultation            Time Spent for Care: 15 minutes  More than 50% of total time spent on counseling and coordination of care as described above  Current Length of Stay: 1 day(s)    Current Patient Status: Inpatient       Code Status: Level 3 - DNAR and DNI      Subjective:   No acute distress    Objective:     Vitals:   Temp (24hrs), Av 2 °F (36 8 °C), Min:97 3 °F (36 3 °C), Max:99 1 °F (37 3 °C)    Temp:  [97 3 °F (36 3 °C)-99 1 °F (37 3 °C)] 99 1 °F (37 3 °C)  HR:  [] 90  Resp:  [16-20] 20  BP: (105-150)/(62-95) 131/81  SpO2:  [95 %-100 %] 97 %  Body mass index is 28 63 kg/m²  Input and Output Summary (last 24 hours): Intake/Output Summary (Last 24 hours) at 2022 0930  Last data filed at 2022 6304  Gross per 24 hour   Intake 1000 ml   Output 525 ml   Net 475 ml       Physical Exam:     Physical Exam  Constitutional:       Appearance: Normal appearance  HENT:      Head: Normocephalic and atraumatic  Cardiovascular:      Rate and Rhythm: Normal rate and regular rhythm  Pulses: Normal pulses  Heart sounds: Normal heart sounds  Pulmonary:      Effort: Pulmonary effort is normal    Abdominal:      General: Abdomen is flat  Palpations: Abdomen is soft  Musculoskeletal:         General: No swelling or tenderness  Normal range of motion  Cervical back: Normal range of motion  Neurological:      General: No focal deficit present  Mental Status: He is alert     Psychiatric:         Mood and Affect: Mood normal          Additional Data:     Labs:    Results from last 7 days   Lab Units 11/03/22  1621   WBC Thousand/uL 14 91*   HEMOGLOBIN g/dL 17 5*   HEMATOCRIT % 53 6*   PLATELETS Thousands/uL 258   NEUTROS PCT % 87*   LYMPHS PCT % 6*   MONOS PCT % 6   EOS PCT % 0     Results from last 7 days   Lab Units 11/04/22  0818 11/04/22  0000   POTASSIUM mmol/L 3 4* 3 1*   CHLORIDE mmol/L 117* 117*   CO2 mmol/L 27 28   BUN mg/dL 59* 64*   CREATININE mg/dL 1 69* 1 88*   CALCIUM mg/dL 8 3 8 5   ALK PHOS U/L  --  73   ALT U/L  --  62   AST U/L  --  230*     Results from last 7 days   Lab Units 11/04/22  0818   INR  9 33*       * I Have Reviewed All Lab Data Listed Above  * Additional Pertinent Lab Tests Reviewed: All Labs Within Last 24 Hours Reviewed      Recent Cultures (last 7 days):     Results from last 7 days   Lab Units 11/03/22  2300   BLOOD CULTURE  Received in Microbiology Lab  Culture in Progress  Received in Microbiology Lab  Culture in Progress  Last 24 Hours Medication List:   Current Facility-Administered Medications   Medication Dose Route Frequency Provider Last Rate   • cefTRIAXone  2,000 mg Intravenous Q24H Linda Maador, DO 2,000 mg (11/04/22 0021)   • dextrose  250 mL/hr Intravenous Continuous Linda Amador,  mL/hr (76/59/65 4224)   • folic acid 1 mg, thiamine 100 mg in 0 9% sodium chloride 100 mL IVPB   Intravenous Daily Linda Amador,  mL/hr at 11/04/22 0436   • insulin lispro  1-5 Units Subcutaneous Q6H [address] Linda Amador, DO     • pantoprazole  40 mg Oral BID Linda Micheler DO          Today, Patient Was Seen By: Maribel Goodwin    ** Please Note: Dictation voice to text software may have been used in the creation of this document   **

## 2022-11-04 NOTE — ASSESSMENT & PLAN NOTE
-CPK > 7000  -acute kidney injury with creatinine up to 2 5 from a normal creatinine recently  -noted hypernatremia with sodium 151  -in setting of minimal ambulation and has been going up the stairs by crawling in using his buttocks  -given 2 L normal saline in the ER  Plan  > admit to medicine on telemetry  Given hypernatremia with sodium 151 will need to hold off additional aggressive IV fluids and switch to D5W at 75 cc/hour with checking of the sodium every 6 hours  Monitor sodium every 4 hours  Consult Nephrology    > Neuro checks q 4 hours, fall precautions

## 2022-11-04 NOTE — CASE MANAGEMENT
Case Management Discharge Planning Note    Patient name Mildred Carlson  Location [facility] 046-17 MRN [id number]  : 1960 Date 2022       Current Admission Date: 11/3/2022  Current Admission Diagnosis:Rhabdomyolysis   Patient Active Problem List    Diagnosis Date Noted   • Rhabdomyolysis 2022   • Hypernatremia 2022   • DAYAN (acute kidney injury) ([place] [address] ) 2022   • Sepsis ([place] [address] ) 2022   • Hyponatremia 10/14/2022   • SIADH (syndrome of inappropriate ADH production) ([place] [address] ) 10/14/2022   • Subclinical hyperthyroidism 10/14/2022   • Supratherapeutic INR 10/11/2022   • Sinus arrhythmia 10/10/2022   • Mild protein-calorie malnutrition ([place] [address] ) 10/07/2022   • Medical clearance for incarceration 2022   • 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 attack 2022   • Atypical chest pain 2022   • Hypokalemia 2022   • Dizziness 2022   • Renal cyst 2022   • PVD (peripheral vascular disease) ([place] [address] ) 2022   • Ataxia 2022   • CVA (cerebral vascular accident) ([place] [address] ) 2022   • Right inguinal pain 2022   • Alkaline phosphatase elevation 2022   • Low back pain 2022   • S/P laparoscopic hernia repair 2021   • Non-recurrent bilateral inguinal hernia without obstruction or gangrene 2021   • Gastric polyps 2021   • Gastric AVM 2021   • Edema of both lower legs 2021   • Tremor of right hand 2021   • Weakness of both lower extremities 2021   • Anticoagulated on Coumadin 2021   • CORIE (obstructive sleep apnea)    • Hyperbilirubinemia 08/10/2020   • Antiphospholipid antibody with hypercoagulable state ([place] [address] ) 2020   • History of stroke 2020   • Other viral warts 2019   • Physical deconditioning 2019   • Class 2 obesity in adult 2018   • Bright red blood per rectum 11/10/2017   • Numbness 2017   • H/O aortic aneurysm repair 2017   • Hypertension 08/26/2017   • GERD (gastroesophageal reflux disease) 08/26/2017   • Hyperlipidemia 03/17/2017   • Peyronie disease 12/09/2016   • Vitamin D deficiency 06/15/2016   • Atrial fibrillation ([address] ) 11/17/2014   • SHIMON (generalized anxiety disorder) 11/11/2014   • Constipation 09/19/2014   • Irritable bowel syndrome 04/24/2014   • Kidney stones 04/24/2014      LOS (days): 1  Geometric Mean LOS (GMLOS) (days): 5 00  Days to GMLOS:4 2     OBJECTIVE:  Risk of Unplanned Readmission Score: 28 46         Current admission status: Inpatient   Preferred Pharmacy:   [address] [place] [address] [address]  Phone: [phone] Fax: [phone]    Primary Care Provider: Jatin Gutierrez MD    Primary Insurance: [address]  Secondary Insurance:     DISCHARGE DETAILS:                                                                                                 Additional Comments: [address] REPLIED WILL NOT ACCEPT BACK, [address], NEEDS REHAB

## 2022-11-04 NOTE — ASSESSMENT & PLAN NOTE
Xi Garcia is a 58 y o  male who has past medical history significant for CVA x3 (failed xarelto and eliquis in the past-now on coumadin ), afib, antiphospholipid syndrome on PTA warfarin, thoracic aortic aneurysm, cognitive impairment, severe anxiety who presented to [facility] ER on the evening of 11/3 with worsening fatigue as well as decreased appetite  Recently discharged from [facility] for psych hospitalization abilify, Ativan, mirtazapine and melatonin  Patient's sister had noted patient has been crawling up and down the stairs because he has been so fatigued  She does report the patient has a history of 3 strokes in the past the patient has some baseline cognitive impairment from this  Of note, patient has had a tremor that resulted after 1 of his past strokes  On exam patient has some baseline cognitive deficits in terms of being able to recollect what happened to him and how he got to the hospital   Otherwise he is completely oriented and able to quit answer all questions appropriately  There is some noted bilateral lower extremity weakness but this is possibly due to patient not wanting to participate in exam  Has had L hand 3-4 hz med amplitude circular tremor that is quite apparent at rest   There was rigidity appreciated in the left upper extremity with tremor is but can not appreciate on the right upper extremity even with contralateral augmentation  There was no rigidity appreciated as well as bilateral lower extremities  There is some noted masked facies appreciated  W/U:   Procal: 0 24  TSH 0 282, T4 1 56 (low)  Na: 153  Na: 1 88  Lactic Acid 1 5 <- 2 4   UA bland   CK: 7247 , CKMB 35 3     - CTH 11/03: No acute intracranial abnormality  Encephalomalacia involving the right occipital lobe and bilateral cerebelli    Grossly stable appearance of periventricular hypoattenuation compared to MRI brain 8/29/2022, likely representing chronic microvascular ischemic changes  (focal encephalomalacia and gliosis involving the right occipital lobe and bilateral cerebelli  Again seen are chronic lacunar infarcts in bilateral basal ganglia  Periventricular moderate patchy low-attenuation is again seen, grossly unchanged in extent compared to prior MRI brain 8/29/2022)      Impression:  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 given the patient seems to have masked facies as well as the tremor is at rest; although cogwheel rigidity is more appreciated in L arm/hand and could not appreciate on RUE or bilat LE even w/ contralateral augmentation     Plan   - no acute intervention hospital at this time  - would recommend to follow-up with outpatient neurology team Bettina Perez, Dr Saran Sewell) in 6-8 weeks to further address tremor whether a trial of sinemet is appropriate in the outpatient setting  - No other further recommendations from the inpatient Neurology perspective  - Please contact Neurology any further questions

## 2022-11-04 NOTE — ASSESSMENT & PLAN NOTE
Past hx of lacunar CVA x3 (failed xarelto and eliquis in the past-now on coumadin ), afib, antiphospholipid syndrome on PTA warfarin    Lab Results   Component Value Date    INR 9 33 (HH) 11/04/2022    INR 8 28 (HH) 11/04/2022    INR 6 02 (HH) 11/03/2022    INR 2 20 (H) 10/26/2022       Plan  - Would recommend to get INR to a therapeutic level as per primary team and then restart warfarin once INR is therapeutic for secondary stroke prevention  - Antiphospholipid syndrome management as per hematology

## 2022-11-04 NOTE — RESPIRATORY THERAPY NOTE
RT Protocol Note  Pelon Briscoe 58 y o  male MRN: [id number]  Unit/Bed#: [facility] 408-01 Encounter: [id number]    Assessment    Active Problems:    * No active hospital problems   *      Home Pulmonary Medications:  none       Past Medical History:   Diagnosis Date    Aneurysm of thoracic aorta     last assessed 11/20/17    Anxiety     currently on no meds    Arthritis     Bilateral inguinal hernia     Cardiac disease     Cognitive impairment     CVA (cerebral vascular accident) ([facility] )     Depression     GERD (gastroesophageal reflux disease)     Hearing loss of aging     Heart disease     Hyperlipidemia     Hypertension     Irritable bowel syndrome     Kidney stone     Obesity     Occasional tremors     left arm since stroke    Palpitations     Panic attack     PONV (postoperative nausea and vomiting)     and headache x 3 days    Psychiatric illness     Sciatica     right and left  side    Shortness of breath     on exertion    Sleep apnea     is not using a CPAP    Sleep difficulties     Spitting up blood 05/21/2021    Resolved    Status post placement of implantable loop recorder     Stroke ([place] [address] ) 11/2014    Stroke ([place] [address] ) 03/2021    TIA (transient ischemic attack)      Social History     Socioeconomic History    Marital status:      Spouse name: None    Number of children: None    Years of education: None    Highest education level: None   Occupational History    Occupation: disabled     Tobacco Use    Smoking status: Never Smoker    Smokeless tobacco: Never Used    Tobacco comment: no second hand smoke exposure   Vaping Use    Vaping Use: Never used   Substance and Sexual Activity    Alcohol use: Not Currently     Comment: very rare in past    Drug use: Never    Sexual activity: Not Currently   Other Topics Concern    None   Social History Narrative    Caffeine use    Completed some college     as per [employer]     Social Determinants of Health     Financial Resource Strain: Not on file   Food Insecurity: Not on file   Transportation Needs: Not on file   Physical Activity: Not on file   Stress: Not on file   Social Connections: Not on file   Intimate Partner Violence: Not on file   Housing Stability: Not on file       Subjective         Objective    Physical Exam:   Assessment Type: (P) Assess only  General Appearance: (P) Awake, Alert  Respiratory Pattern: (P) Normal  Chest Assessment: (P) Chest expansion symmetrical  Bilateral Breath Sounds: (P) Diminished, Clear    Vitals:  Blood pressure 113/62, pulse (!) 113, temperature 97 5 °F (36 4 °C), temperature source Oral, resp  rate 18, height 5' 8" (1 727 m), weight 85 2 kg (187 lb 13 3 oz), SpO2 95 %  Imaging and other studies: I have personally reviewed pertinent reports  11/03/22 4763   Respiratory Protocol   Protocol Initiated? Yes   Protocol Selection Respiratory   Language Barrier? No   Medical & Social History Reviewed? Yes   Diagnostic Studies Reviewed? Yes   Physical Assessment Performed? Yes   Respiratory Plan Discontinue Protocol   Respiratory Assessment   Assessment Type Assess only   General Appearance Awake; Alert   Respiratory Pattern Normal   Chest Assessment Chest expansion symmetrical   Bilateral Breath Sounds Diminished;Clear   Resp Comments Assessment of Respiratory protocol  Pt adm for Mental status changed  BS Mostly Bilat clear 96% on R/A  Did not come in for any respiratoory issues  Does not have any hx of Pulmonary/COPD  Does not take any respiratory tx at home  Cont with IS Will DC protocol  Plan    Respiratory Plan: (P) Discontinue Protocol        Resp Comments: (P) Assessment of Respiratory protocol  Pt adm for Mental status changed  BS Mostly Bilat clear 96% on R/A  Did not come in for any respiratoory issues  Does not have any hx of Pulmonary/COPD  Does not take any respiratory tx at home  Cont with IS Will DC protocol

## 2022-11-05 LAB
ANION GAP SERPL CALCULATED.3IONS-SCNC: 3 MMOL/L (ref 4–13)
ANION GAP SERPL CALCULATED.3IONS-SCNC: 5 MMOL/L (ref 4–13)
APTT PPP: 54 SECONDS (ref 23–37)
BASOPHILS # BLD AUTO: 0.01 THOUSANDS/ÂΜL (ref 0–0.1)
BASOPHILS NFR BLD AUTO: 0 % (ref 0–1)
BUN SERPL-MCNC: 31 MG/DL (ref 5–25)
BUN SERPL-MCNC: 41 MG/DL (ref 5–25)
CALCIUM SERPL-MCNC: 8.2 MG/DL (ref 8.3–10.1)
CALCIUM SERPL-MCNC: 8.6 MG/DL (ref 8.3–10.1)
CHLORIDE SERPL-SCNC: 111 MMOL/L (ref 96–108)
CHLORIDE SERPL-SCNC: 114 MMOL/L (ref 96–108)
CO2 SERPL-SCNC: 28 MMOL/L (ref 21–32)
CO2 SERPL-SCNC: 28 MMOL/L (ref 21–32)
CREAT SERPL-MCNC: 1.12 MG/DL (ref 0.6–1.3)
CREAT SERPL-MCNC: 1.27 MG/DL (ref 0.6–1.3)
EOSINOPHIL # BLD AUTO: 0.01 THOUSAND/ÂΜL (ref 0–0.61)
EOSINOPHIL NFR BLD AUTO: 0 % (ref 0–6)
ERYTHROCYTE [DISTWIDTH] IN BLOOD BY AUTOMATED COUNT: 14.7 % (ref 11.6–15.1)
GFR SERPL CREATININE-BSD FRML MDRD: 60 ML/MIN/1.73SQ M
GFR SERPL CREATININE-BSD FRML MDRD: 70 ML/MIN/1.73SQ M
GLUCOSE SERPL-MCNC: 110 MG/DL (ref 65–140)
GLUCOSE SERPL-MCNC: 114 MG/DL (ref 65–140)
GLUCOSE SERPL-MCNC: 121 MG/DL (ref 65–140)
GLUCOSE SERPL-MCNC: 122 MG/DL (ref 65–140)
GLUCOSE SERPL-MCNC: 134 MG/DL (ref 65–140)
GLUCOSE SERPL-MCNC: 144 MG/DL (ref 65–140)
HCT VFR BLD AUTO: 35.2 % (ref 36.5–49.3)
HGB BLD-MCNC: 11.8 G/DL (ref 12–17)
IMM GRANULOCYTES # BLD AUTO: 0.04 THOUSAND/UL (ref 0–0.2)
IMM GRANULOCYTES NFR BLD AUTO: 1 % (ref 0–2)
INR PPP: 6.79 (ref 0.84–1.19)
LYMPHOCYTES # BLD AUTO: 0.87 THOUSANDS/ÂΜL (ref 0.6–4.47)
LYMPHOCYTES NFR BLD AUTO: 11 % (ref 14–44)
MCH RBC QN AUTO: 31.1 PG (ref 26.8–34.3)
MCHC RBC AUTO-ENTMCNC: 33.5 G/DL (ref 31.4–37.4)
MCV RBC AUTO: 93 FL (ref 82–98)
MONOCYTES # BLD AUTO: 0.67 THOUSAND/ÂΜL (ref 0.17–1.22)
MONOCYTES NFR BLD AUTO: 9 % (ref 4–12)
NEUTROPHILS # BLD AUTO: 6.21 THOUSANDS/ÂΜL (ref 1.85–7.62)
NEUTS SEG NFR BLD AUTO: 79 % (ref 43–75)
NRBC BLD AUTO-RTO: 0 /100 WBCS
PHOSPHATE SERPL-MCNC: 2.2 MG/DL (ref 2.3–4.1)
PLATELET # BLD AUTO: 144 THOUSANDS/UL (ref 149–390)
PMV BLD AUTO: 11.8 FL (ref 8.9–12.7)
POTASSIUM SERPL-SCNC: 3.1 MMOL/L (ref 3.5–5.3)
POTASSIUM SERPL-SCNC: 3.1 MMOL/L (ref 3.5–5.3)
PROTHROMBIN TIME: 59.1 SECONDS (ref 11.6–14.5)
RBC # BLD AUTO: 3.79 MILLION/UL (ref 3.88–5.62)
SODIUM SERPL-SCNC: 144 MMOL/L (ref 135–147)
SODIUM SERPL-SCNC: 145 MMOL/L (ref 135–147)
WBC # BLD AUTO: 7.81 THOUSAND/UL (ref 4.31–10.16)

## 2022-11-05 RX ORDER — LORAZEPAM 2 MG/ML
0.5 INJECTION INTRAMUSCULAR EVERY 6 HOURS PRN
Status: DISCONTINUED | OUTPATIENT
Start: 2022-11-05 | End: 2022-11-09

## 2022-11-05 RX ADMIN — PANTOPRAZOLE SODIUM 40 MG: 40 TABLET, DELAYED RELEASE ORAL at 09:51

## 2022-11-05 RX ADMIN — DEXTROSE 125 ML/HR: 5 SOLUTION INTRAVENOUS at 03:30

## 2022-11-05 RX ADMIN — DEXTROSE 125 ML/HR: 5 SOLUTION INTRAVENOUS at 11:57

## 2022-11-05 RX ADMIN — LORAZEPAM 0.5 MG: 2 INJECTION INTRAMUSCULAR; INTRAVENOUS at 22:24

## 2022-11-05 RX ADMIN — DEXTROSE 125 ML/HR: 5 SOLUTION INTRAVENOUS at 20:19

## 2022-11-05 RX ADMIN — THIAMINE HYDROCHLORIDE: 100 INJECTION, SOLUTION INTRAMUSCULAR; INTRAVENOUS at 09:51

## 2022-11-05 RX ADMIN — CEFTRIAXONE SODIUM 2000 MG: 10 INJECTION, POWDER, FOR SOLUTION INTRAVENOUS at 22:24

## 2022-11-05 NOTE — ASSESSMENT & PLAN NOTE
Increased increased to 9 has history of antiphospholipid syndrome and AFib  -no evidence of bleeding  Plan  · > as INR mildly elevated and no source of bleeding, will hold off giving any reversal agents of supratherapeutic INR especially given patient's history of antiphospholipid syndrome and history of 3 previous CVA as well as having ischemic   · likely etiology of of elevated INR is multifactorial   Suspect poor nutrition in conjunction with vitamin K antagonism of Coumadin as etiology of elevated INR  Patient INR trending downward with holding of Coumadin  Given INR is less than 10 and history of hypercoagulable state in the setting of stroke history would prefer to monitor and trend off of Coumadin    Patient will need dosing adjustment once INR is down

## 2022-11-05 NOTE — ASSESSMENT & PLAN NOTE
-Hx CVA x 3 in setting of antiphospholipid syndrome  -on PTA warfarin although noted supratherapeutic INR  -CT head in ER: No acute intracranial abnormality  Encephalomalacia involving the right occipital lobe and bilateral cerebelli  Grossly stable appearance of periventricular hypoattenuation compared to MRI brain 8/29/2022, likely representing chronic microvascular ischemic changes    -on exam answers questions in a very low voice when asked, noted left upper extremity tremor that remains constant with movement, diffuse fatigue of bilateral lower extremities  Plan  Neurology consulted

## 2022-11-05 NOTE — PROGRESS NOTES
NEPHROLOGY PROGRESS NOTE   Rachna Fried 58 y o  male MRN: [id number]  Unit/Bed#: [facility] 408-01 Encounter: [id number]  Reason for Consult:  Acute kidney injury    Assessment / Plan :  1  Acute kidney injury, most likely secondary to prerenal azotemia given diuretics and poor oral intake, overall has improving with IV fluids  2  Mild rhabdomyolysis, continue follow CPK  3  Hypernatremia secondary to impaired free water intake and ongoing diuretic use, improving with D5W  4  Hypokalemia, continue with replacement as needed  5  Rhabdomyolysis, continue monitor CPK, continue with IV fluids  6  Apparent sepsis, on antibiotics as per primary service  7  History of CVA  8  History of antiphospholipid syndrome    · Overall renal function has significantly improved again representing likely prerenal azotemia  · Sodium level also improving currently 145, continue with D5W, repeat laboratory studies at 3:00 p m   · Encourage oral intake as tolerated  · Continue with potassium replacement as needed    SUBJECTIVE:  Seen and examined  Awake, appears confused  Does not appear short of breath  Review of Systems    OBJECTIVE:  Current Weight: Weight - Scale: 86 3 kg (190 lb 4 8 oz)  Vitals:    11/04/22 1937 11/04/22 2240 11/05/22 0225 11/05/22 0600   BP: 135/83 123/73 110/78    BP Location: Right arm Right arm Right arm    Pulse: (!) 110 (!) 112 101    Resp: 15 16 17    Temp: 99 4 °F (37 4 °C) 98 6 °F (37 °C) 99 3 °F (37 4 °C)    TempSrc: Oral Oral Oral    SpO2: 96% 99% 97%    Weight:    86 3 kg (190 lb 4 8 oz)   Height:           Intake/Output Summary (Last 24 hours) at 11/5/2022 8956  Last data filed at 11/5/2022 0600  Gross per 24 hour   Intake 3889 17 ml   Output 1150 ml   Net 2739 17 ml       Physical Exam  Constitutional:       Appearance: He is not ill-appearing  Cardiovascular:      Rate and Rhythm: Normal rate and regular rhythm     Pulmonary:      Effort: Pulmonary effort is normal       Breath sounds: Normal breath sounds  Abdominal:      General: There is no distension  Palpations: Abdomen is soft  Musculoskeletal:      Right lower leg: No edema  Left lower leg: No edema  Skin:     General: Skin is warm and dry  Neurological:      Mental Status: He is alert  He is disoriented           Medications:    Current Facility-Administered Medications:   •  ceftriaxone (ROCEPHIN) 2 g/50 mL in dextrose IVPB, 2,000 mg, Intravenous, Q24H, Cassandra Cooper, DO, Stopped at 11/04/22 2301  •  dextrose 5 % infusion, 125 mL/hr, Intravenous, Continuous, Jaxon Macdonadl, DO, Last Rate: 125 mL/hr at 11/05/22 0330, 125 mL/hr at 99/21/92 1313  •  folic acid 1 mg, thiamine (VITAMIN B1) 100 mg in sodium chloride 0 9 % 100 mL IV piggyback, , Intravenous, Daily, Cassandra Cooper, DO, Last Rate: 200 mL/hr at 11/04/22 0436, New Bag at 11/04/22 0436  •  insulin lispro (HumaLOG) 100 units/mL subcutaneous injection 1-5 Units, 1-5 Units, Subcutaneous, TID AC **AND** Fingerstick Glucose (POCT), , , TID AC, Andres Zapien, DO  •  pantoprazole (PROTONIX) EC tablet 40 mg, 40 mg, Oral, BID, Cassandra Cooper, DO    Laboratory Results:  Results from last 7 days   Lab Units 11/05/22  0456 11/04/22  1457 11/04/22  0818 11/04/22  0345 11/04/22  0000 11/03/22  2300 11/03/22  1621   WBC Thousand/uL 7 81 10 67*  --  10 88*  --   --  14 91*   HEMOGLOBIN g/dL 11 8* 12 9  --  12 7  --   --  17 5*   HEMATOCRIT % 35 2* 37 9  --  39 3  --   --  53 6*   PLATELETS Thousands/uL 144* 169  --  156  --   --  258   POTASSIUM mmol/L 3 1* 3 3* 3 4*  --  3 1* 3 1* 2 8*   CHLORIDE mmol/L 114* 116* 117*  --  117* 117* 109*   CO2 mmol/L 28 29 27  --  28 28 30   BUN mg/dL 41* 52* 59*  --  64* 73* 77*   CREATININE mg/dL 1 27 1 55* 1 69*  --  1 88* 2 23* 2 60*   CALCIUM mg/dL 8 6 8 7 8 3  --  8 5 8 9 10 2*   MAGNESIUM mg/dL  --   --   --   --  2 9*  --  3 0*

## 2022-11-05 NOTE — ASSESSMENT & PLAN NOTE
-previous admission from the end of September until the end of October at [facility] in which he was hospitalized for over a month for severe anxiety and inability to care for self  -per sister reportedly was able to ambulate prior to the hospitalization but after the hospitalization patient needing assistance with ambulation at home  -during hospitalization there patient developed hyponatremia during his course of stay and Zoloft that had been started was stopped  -placed on mirtazapine as well as Abilify melatonin and Ativan on discharge  Plan  > hold medications besides Abilify to avoid causing CNS depression given patient's or any fatigued state  > consider Psychiatry consultation

## 2022-11-05 NOTE — PROGRESS NOTES
Medical Oncology/Hematology Consult Note  Barrera Heck, male, 58 y o , 1960,  PPHP 408/PPHP [id number], [id number]     Assessment and Plan  1  Hx of APLS syndrome  2  Supratherapeutic INR  3  Rhabdomyolysis   4  AMS  5  DAYAN    Mr Svetlana Sanz is a 58year old male with underlying APLS syndrome (most recently on Coumadin for [facility]), recurrent CVA, and other hx as outlined below who presented on 11/3/22 for evaluation of worsening fatigue  Patient's workup for APLS has been positive for lupus anticoagulant and cardiolipin antibodies in the past  Rechecking beta-2 glycoprotein levels and cardiolipin antibodies  Most recent INR is 9 33 this morning, but patient with no overt bleeding  Elevated INR likely 2/2 combination of poor nutrition and use of warfarin  Mixing study results have been reviewed: with mixing, pt's PT had improved to 15 from 66  PTT had corrected to 29 from 60 with mixing also  This is suggestive of factor deficiencies which is likely 2/2 poor nutrition and 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; additionally patient has a hx of positive lupus anticoagulant status, which can contribute to prolonged PTT also  Continue holding coumadin  No need for any reversal agents based on this morning's INR level of 6 79 with FFP or vit K as patient hasn't had any overt bleeding clinically  Once the INR is closer to therapeutic range of 2-3, would restart Warfarin at a lower dose than home dose of 4mg (2 5 or 3mg would likely be appropriate)  Rhabdomyolysis and DAYAN management as per primary team  AMS workup as per neuro recs  Outpatient follow up plan: TBD  Patient did see Francesco Gibbs PA-C, once in office in October 2020  Communication with patient/family:  Patient and family to be updated after case is discussed with attending  Communication with team:  Primary team to be updated with recommendations after case is discussed with hematology/oncology attending       Case to be discussed with hematology/oncology attending  Reason for consultation: hx of APLS, recommendations regarding elevated INR    History of present illness:  Jose Robbins is a 58 y o  male with hx of CVA, APLS (on Coumadin), thoracic aortic aneurysm, and severe anxiety who presented to the hospital on 11/3/22 for evaluation of worsening fatigue and decreased appetite  Patient has had a prolonged hospitalization at [facility] from [facility] October for management of severe anxiety  Patient was sent into the hospital as his sister was concerned about patient having persistent fatigue  Admission labs notable for CK 7247, Na 149 > 150, Cr 2 60 (normal renal function at baseline)  Patient has been admitted for rhabdomyolysis and workup of AMS  CT brain showed no acute intracranial abnormality, showed encephalomalacia involving the right occipital lobe and bilateral cerebellar live; it showed grossly stable appearance of periventricular hypoattenuation compared to MRI brain from August 2022, deemed to be secondary to chronic microvascular ischemic changes  During this admission, patient was noted to have an elevated INR of 6 02 on arrival > 8 28 this morning > 9 33 on recheck  Patient with no overt bleeding episodes  Hematology team has been consulted for recommendations regarding reversal if needed for INR > 10  With regards to patient's antiphospholipid syndrome, patient has seen hematology in the OP setting, last seen in office on October 2020  Patient's March 2020 bloodwork was notable for lupus anticoagulant antibodies and cardiolipin antibodies  Negative for factor 5 Leiden mutation, prothrombin gene mutation, protein C or S deficiency  October 2020 blood work was negative for lupus anticoagulant but was positive for cardiolipin antibody  D-dimer was also elevated at 1 35   Patient was recommended to be on anticoagulation with Eliquis indefinitely at that time, given his history of recurrent strokes  Patient has been on Coumadin since May 2021 due to concern for Eliquis failure as patient was having strokes even on Eliquis  INR 8  No bleeding  Hx Antiphospholipid syndrome  Repeating INR at 0900 and holding Warfarin  Question for Hematology team: Normally would reverse if INR >10 without signs of bleeding but in a patient with APS what would be your thoughts? He has a hx of ischemic CVA x 3  Previously was on apixaban but had stroke and switched to warfarin  Interval Changes:   Patient's renal function has significantly improved, creatinine 1 27 this am  Mixing study results have been reviewed: with mixing, pt's PT had improved to 15 from 66  PTT had corrected to 29 from 60 with mixing also  This is suggestive of factor deficiencies which is likely 2/2 poor nutrition and 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  Patient continues to deny over bleeding  INR 6 79 this morning, down from 7 30 yesterday  Review of Systems:    Review of Systems   Constitutional: Positive for fatigue  Negative for chills, fever and unexpected weight change  HENT: Negative for ear pain, mouth sores and sore throat  Eyes: Negative for pain and visual disturbance  Respiratory: Negative for cough and shortness of breath  Cardiovascular: Negative for chest pain and palpitations  Gastrointestinal: Negative for abdominal pain and vomiting  Genitourinary: Negative for dysuria and hematuria  Musculoskeletal: Negative for arthralgias and back pain  Skin: Negative for color change and rash  Neurological: Positive for weakness  Negative for seizures and syncope  Hematological: Negative for adenopathy  Does not bruise/bleed easily  Psychiatric/Behavioral: Negative for agitation and hallucinations  Slow to respond     All other systems reviewed and are negative  Oncology History:   Cancer Staging  No matching staging information was found for the patient    Oncology History No history exists  Past Medical History:   Past Medical History:   Diagnosis Date   • Aneurysm of thoracic aorta     last assessed 11/20/17   • Anxiety     currently on no meds   • Arthritis    • Bilateral inguinal hernia    • Cardiac disease    • Cognitive impairment    • CVA (cerebral vascular accident) ([facility] [address] )    • Depression    • GERD (gastroesophageal reflux disease)    • Hearing loss of aging    • Heart disease    • Hyperlipidemia    • Hypertension    • Irritable bowel syndrome    • Kidney stone    • Obesity    • Occasional tremors     left arm since stroke   • Palpitations    • Panic attack    • PONV (postoperative nausea and vomiting)     and headache x 3 days   • Psychiatric illness    • Sciatica     right and left  side   • Shortness of breath     on exertion   • Sleep apnea     is not using a CPAP   • Sleep difficulties    • Spitting up blood 05/21/2021    Resolved   • Status post placement of implantable loop recorder    • St[facility] ) 11/2014   • Stroke ([facility] ) 03/2021   • TIA (transient ischemic attack)        Past Surgical History:   Procedure Laterality Date   • AORTA SURGERY      thoracic - aneurysmorrhaphy   • APPENDECTOMY     • ASCENDING AORTIC ANEURYSM REPAIR      resolved 8/19/15   • CARDIAC LOOP RECORDER     • CHOLECYSTECTOMY      laparoscopic   • COLONOSCOPY     • CYSTOSCOPY      with removal of object- stent removal   • KNEE ARTHROSCOPY Right 1996    with medial meniscus repair   • LITHOTRIPSY      renal   • ORTHOPEDIC SURGERY     • DC FRAGMENT KIDNEY STONE/ ESWL Left 5/17/2018    Procedure: LITHROTRIPSY EXTRACORPORAL SHOCKWAVE (ESWL);   Surgeon: Bakari Monroe MD;  [facility]: [facility];  Service: Urology   • DC Ameena Ray[address] Bilateral 12/9/2021    Procedure: ROBOTIC REPAIR HERNIA INGUINAL;  Surgeon: Kamari Tan MD;  Location: [facility];  Service: General   • THUMB ARTHROSCOPY Right 1976    ligament repair   • UPPER GASTROINTESTINAL ENDOSCOPY         Family History   Problem Relation Age of Onset   • Diverticulitis Mother         of colon   • Nephrolithiasis Mother    • Emphysema Father    • Nephrolithiasis Sister    • Heart attack Maternal Grandfather 72   • Breast cancer Paternal Grandmother    • Lung cancer Paternal Grandfather    • Cancer Family    • Coronary artery disease Family    • Diabetes Family         sibling   • Hypertension Family         sibling   • Hernia Family         sibling       Social History     Socioeconomic History   • Marital status:      Spouse name: None   • Number of children: None   • Years of education: None   • Highest education level: None   Occupational History   • Occupation: disabled     Tobacco Use   • Smoking status: Never Smoker   • Smokeless tobacco: Never Used   • Tobacco comment: no second hand smoke exposure   Vaping Use   • Vaping Use: Never used   Substance and Sexual Activity   • Alcohol use: Not Currently     Comment: very rare in past   • Drug use: Never   • Sexual activity: Not Currently   Other Topics Concern   • None   Social History Narrative    Caffeine use    Completed some college     as per [facility]     Social Determinants of Health     Financial Resource Strain: Not on file   Food Insecurity: No Food Insecurity   • Worried About Running Out of Food in the Last Year: Never true   • Ran Out of Food in the Last Year: Never true   Transportation Needs: No Transportation Needs   • Lack of Transportation (Medical): No   • Lack of Transportation (Non-Medical):  No   Physical Activity: Not on file   Stress: Not on file   Social Connections: Not on file   Intimate Partner Violence: Not on file   Housing Stability: Low Risk    • Unable to Pay for Housing in the Last Year: No   • Number of Places Lived in the Last Year: 1   • Unstable Housing in the Last Year: No         Current Facility-Administered Medications:   •  ceftriaxone (ROCEPHIN) 2 g/50 mL in dextrose IVPB, 2,000 mg, Intravenous, Q24H, Jarod HERNANDEZ Wali Fletcher, , Stopped at 11/04/22 2301  •  dextrose 5 % infusion, 125 mL/hr, Intravenous, Continuous, Angela Macdonald DO, Last Rate: 125 mL/hr at 11/05/22 0330, 125 mL/hr at 77/49/01 6680  •  folic acid 1 mg, thiamine (VITAMIN B1) 100 mg in sodium chloride 0 9 % 100 mL IV piggyback, , Intravenous, Daily, Georgie Cruz DO, Last Rate: 200 mL/hr at 11/04/22 0436, New Bag at 11/04/22 0436  •  insulin lispro (HumaLOG) 100 units/mL subcutaneous injection 1-5 Units, 1-5 Units, Subcutaneous, TID AC **AND** Fingerstick Glucose (POCT), , , TID AC, Andres Zapien DO  •  pantoprazole (PROTONIX) EC tablet 40 mg, 40 mg, Oral, BID, Georgie Cruz DO    Medications Prior to Admission   Medication   • amLODIPine (NORVASC) 5 mg tablet   • ARIPiprazole (ABILIFY) 5 mg tablet   • atorvastatin (LIPITOR) 40 mg tablet   • carvedilol (COREG) 25 mg tablet   • docusate sodium (COLACE) 100 mg capsule   • famotidine (PEPCID) 40 MG tablet   • LORazepam (ATIVAN) 0 5 mg tablet   • melatonin 5 MG TABS   • mirtazapine (REMERON) 45 MG tablet   • pantoprazole (PROTONIX) 40 mg tablet   • torsemide (DEMADEX) 10 mg tablet   • warfarin (COUMADIN) 4 mg tablet       Allergies   Allergen Reactions   • Losartan Angioedema   • Aspirin Fatigue     Due to blood thinners     • Bactrim [Sulfamethoxazole-Trimethoprim]    • Eliquis [Apixaban] Other (See Comments)     Failed  Had embolic CVA   • Lisinopril      Felt bad, was OK with Zestril brand name  • Tramadol          Physical Exam:     /85 (BP Location: Right arm)   Pulse 98   Temp 98 9 °F (37 2 °C) (Oral)   Resp 22   Ht 5' 8" (1 727 m)   Wt 86 3 kg (190 lb 4 8 oz)   SpO2 99%   BMI 28 94 kg/m²     Physical Exam  Vitals reviewed  Constitutional:       General: He is not in acute distress  Appearance: He is not ill-appearing, toxic-appearing or diaphoretic        Comments: Very lethargic appearing, able to maintain conversation and answer questions though   HENT:      Head: Normocephalic and atraumatic  Mouth/Throat:      Mouth: Mucous membranes are dry  Eyes:      General: No scleral icterus  Extraocular Movements: Extraocular movements intact  Conjunctiva/sclera: Conjunctivae normal    Cardiovascular:      Rate and Rhythm: Regular rhythm  Tachycardia present  Heart sounds: No murmur heard  No gallop  Pulmonary:      Effort: No respiratory distress  Breath sounds: Normal breath sounds  No wheezing, rhonchi or rales  Abdominal:      General: Bowel sounds are normal  There is no distension  Palpations: Abdomen is soft  There is no mass  Musculoskeletal:         General: No swelling, tenderness or deformity  Cervical back: Normal range of motion  Right lower leg: No edema  Left lower leg: No edema  Skin:     General: Skin is warm and dry  Findings: No erythema, lesion or rash  Neurological:      General: No focal deficit present  Mental Status: He is alert and oriented to person, place, and time        Comments: Symmetrical weakness of lower extremities,   UE weakness noted (R>L)  Constant tremor of LUE, chronic    Psychiatric:         Mood and Affect: Mood normal          Judgment: Judgment normal        Recent Results (from the past 48 hour(s))   CBC and differential    Collection Time: 11/03/22  4:21 PM   Result Value Ref Range    WBC 14 91 (H) 4 31 - 10 16 Thousand/uL    RBC 5 67 (H) 3 88 - 5 62 Million/uL    Hemoglobin 17 5 (H) 12 0 - 17 0 g/dL    Hematocrit 53 6 (H) 36 5 - 49 3 %    MCV 95 82 - 98 fL    MCH 30 9 26 8 - 34 3 pg    MCHC 32 6 31 4 - 37 4 g/dL    RDW 14 6 11 6 - 15 1 %    MPV 11 9 8 9 - 12 7 fL    Platelets 027 985 - 670 Thousands/uL    nRBC 0 /100 WBCs    Neutrophils Relative 87 (H) 43 - 75 %    Immat GRANS % 1 0 - 2 %    Lymphocytes Relative 6 (L) 14 - 44 %    Monocytes Relative 6 4 - 12 %    Eosinophils Relative 0 0 - 6 %    Basophils Relative 0 0 - 1 %    Neutrophils Absolute 12 99 (H) 1 85 - 7 62 Thousands/µL Immature Grans Absolute 0 08 0 00 - 0 20 Thousand/uL    Lymphocytes Absolute 0 89 0 60 - 4 47 Thousands/µL    Monocytes Absolute 0 92 0 17 - 1 22 Thousand/µL    Eosinophils Absolute 0 01 0 00 - 0 61 Thousand/µL    Basophils Absolute 0 02 0 00 - 0 10 Thousands/µL   Comprehensive metabolic panel    Collection Time: 11/03/22  4:21 PM   Result Value Ref Range    Sodium 149 (H) 135 - 147 mmol/L    Potassium 2 8 (L) 3 5 - 5 3 mmol/L    Chloride 109 (H) 96 - 108 mmol/L    CO2 30 21 - 32 mmol/L    ANION GAP 10 4 - 13 mmol/L    BUN 77 (H) 5 - 25 mg/dL    Creatinine 2 60 (H) 0 60 - 1 30 mg/dL    Glucose 102 65 - 140 mg/dL    Calcium 10 2 (H) 8 3 - 10 1 mg/dL     (H) 5 - 45 U/L    ALT 85 (H) 12 - 78 U/L    Alkaline Phosphatase 119 (H) 46 - 116 U/L    Total Protein 8 5 (H) 6 4 - 8 4 g/dL    Albumin 3 7 3 5 - 5 0 g/dL    Total Bilirubin 1 96 (H) 0 20 - 1 00 mg/dL    eGFR 25 ml/min/1 73sq m   CK (with reflex to MB)    Collection Time: 11/03/22  4:21 PM   Result Value Ref Range    Total CK 7,247 (H) 39 - 308 U/L   HS Troponin 0hr (reflex protocol)    Collection Time: 11/03/22  4:21 PM   Result Value Ref Range    hs TnI 0hr 88 (H) "Refer to ACS Flowchart"- see link ng/L   Magnesium    Collection Time: 11/03/22  4:21 PM   Result Value Ref Range    Magnesium 3 0 (H) 1 6 - 2 6 mg/dL   Ammonia    Collection Time: 11/03/22  4:21 PM   Result Value Ref Range    Ammonia <10 (L) 11 - 35 umol/L   Protime-INR    Collection Time: 11/03/22  4:21 PM   Result Value Ref Range    Protime 53 9 (H) 11 6 - 14 5 seconds    INR 6 02 (HH) 0 84 - 1 19   CKMB    Collection Time: 11/03/22  4:21 PM   Result Value Ref Range    CK-MB Index <1 0 0 0 - 2 5 %    CK-MB 35 3 (H) 0 0 - 5 0 ng/mL   ECG 12 lead    Collection Time: 11/03/22  4:42 PM   Result Value Ref Range    Ventricular Rate 99 BPM    Atrial Rate 94 BPM    AR Interval 150 ms    QRSD Interval 78 ms    QT Interval 314 ms    QTC Interval 402 ms    P Axis 109 degrees    QRS Axis 38 degrees    T Wave [place] 234 degrees   HS Troponin I 2hr    Collection Time: 11/03/22  6:16 PM   Result Value Ref Range    hs TnI 2hr 70 (H) "Refer to ACS Flowchart"- see link ng/L    Delta 2hr hsTnI -18 <20 ng/L   HS Troponin I 4hr    Collection Time: 11/03/22  8:12 PM   Result Value Ref Range    hs TnI 4hr 77 (H) "Refer to ACS Flowchart"- see link ng/L    Delta 4hr hsTnI -11 <20 ng/L   Basic metabolic panel    Collection Time: 11/03/22 11:00 PM   Result Value Ref Range    Sodium 151 (H) 135 - 147 mmol/L    Potassium 3 1 (L) 3 5 - 5 3 mmol/L    Chloride 117 (H) 96 - 108 mmol/L    CO2 28 21 - 32 mmol/L    ANION GAP 6 4 - 13 mmol/L    BUN 73 (H) 5 - 25 mg/dL    Creatinine 2 23 (H) 0 60 - 1 30 mg/dL    Glucose 151 (H) 65 - 140 mg/dL    Calcium 8 9 8 3 - 10 1 mg/dL    eGFR 30 ml/min/1 73sq m   Lactic Acid with Reflex STAT    Collection Time: 11/03/22 11:00 PM   Result Value Ref Range    LACTIC ACID 2 4 (HH) 0 5 - 2 0 mmol/L   Procalcitonin    Collection Time: 11/03/22 11:00 PM   Result Value Ref Range    Procalcitonin 0 29 (H) <=0 25 ng/ml   Blood culture    Collection Time: 11/03/22 11:00 PM    Specimen: Arm, Right; Blood   Result Value Ref Range    Blood Culture No Growth at 24 hrs  Blood culture    Collection Time: 11/03/22 11:00 PM    Specimen: Hand, Right; Blood   Result Value Ref Range    Blood Culture No Growth at 24 hrs      Comprehensive metabolic panel    Collection Time: 11/04/22 12:00 AM   Result Value Ref Range    Sodium 153 (H) 135 - 147 mmol/L    Potassium 3 1 (L) 3 5 - 5 3 mmol/L    Chloride 117 (H) 96 - 108 mmol/L    CO2 28 21 - 32 mmol/L    ANION GAP 8 4 - 13 mmol/L    BUN 64 (H) 5 - 25 mg/dL    Creatinine 1 88 (H) 0 60 - 1 30 mg/dL    Glucose 154 (H) 65 - 140 mg/dL    Calcium 8 5 8 3 - 10 1 mg/dL    Corrected Calcium 9 8 8 3 - 10 1 mg/dL     (H) 5 - 45 U/L    ALT 62 12 - 78 U/L    Alkaline Phosphatase 73 46 - 116 U/L    Total Protein 5 5 (L) 6 4 - 8 4 g/dL    Albumin 2 4 (L) 3 5 - 5 0 g/dL    Total Bilirubin 1 00 0 20 - 1 00 mg/dL    eGFR 37 ml/min/1 73sq m   Lactic acid, plasma    Collection Time: 11/04/22 12:00 AM   Result Value Ref Range    LACTIC ACID 1 5 0 5 - 2 0 mmol/L   Magnesium    Collection Time: 11/04/22 12:00 AM   Result Value Ref Range    Magnesium 2 9 (H) 1 6 - 2 6 mg/dL   Procalcitonin, Next Day AM Collection    Collection Time: 11/04/22 12:00 AM   Result Value Ref Range    Procalcitonin 0 24 <=0 25 ng/ml   TSH, 3rd generation with Free T4 reflex    Collection Time: 11/04/22 12:00 AM   Result Value Ref Range    TSH 3RD GENERATON 0 282 (L) 0 450 - 4 500 uIU/mL   T4, free    Collection Time: 11/04/22 12:00 AM   Result Value Ref Range    Free T4 1 56 (H) 0 76 - 1 46 ng/dL   Fingerstick Glucose (POCT)    Collection Time: 11/04/22  1:12 AM   Result Value Ref Range    POC Glucose 121 65 - 140 mg/dl   Sodium, urine, random    Collection Time: 11/04/22  1:13 AM   Result Value Ref Range    Sodium, Ur <5    Creatinine, urine, random    Collection Time: 11/04/22  1:13 AM   Result Value Ref Range    Creatinine, Ur 144 0 mg/dL   Osmolality, urine    Collection Time: 11/04/22  1:13 AM   Result Value Ref Range    Osmolality, Ur 568 250 - 900 mmol/KG   UA w Reflex to Microscopic w Reflex to Culture    Collection Time: 11/04/22  2:06 AM    Specimen: Urine   Result Value Ref Range    Color, UA Light Yellow     Clarity, UA Clear     Specific Gravity, UA 1 017 1 003 - 1 030    pH, UA 5 0 4 5, 5 0, 5 5, 6 0, 6 5, 7 0, 7 5, 8 0    Leukocytes, UA Negative Negative    Nitrite, UA Negative Negative    Protein, UA Trace (A) Negative mg/dl    Glucose, UA Negative Negative mg/dl    Ketones, UA Negative Negative mg/dl    Urobilinogen, UA <2 0 <2 0 mg/dl mg/dl    Bilirubin, UA Negative Negative    Occult Blood, UA Moderate (A) Negative   Urine Microscopic    Collection Time: 11/04/22  2:06 AM   Result Value Ref Range    RBC, UA 1-2 None Seen, 1-2 /hpf    WBC, UA 2-4 (A) None Seen, 1-2 /hpf    Epithelial Cells Occasional None Seen, Occasional /hpf    Bacteria, UA None Seen None Seen, Occasional /hpf    MUCUS THREADS Occasional (A) None Seen    Hyaline Casts, UA Innumerable (A) None Seen /lpf   CBC and differential    Collection Time: 11/04/22  3:45 AM   Result Value Ref Range    WBC 10 88 (H) 4 31 - 10 16 Thousand/uL    RBC 4 14 3 88 - 5 62 Million/uL    Hemoglobin 12 7 12 0 - 17 0 g/dL    Hematocrit 39 3 36 5 - 49 3 %    MCV 95 82 - 98 fL    MCH 30 7 26 8 - 34 3 pg    MCHC 32 3 31 4 - 37 4 g/dL    RDW 14 6 11 6 - 15 1 %    MPV 12 3 8 9 - 12 7 fL    Platelets 013 193 - 761 Thousands/uL    nRBC 0 /100 WBCs    Neutrophils Relative 86 (H) 43 - 75 %    Immat GRANS % 0 0 - 2 %    Lymphocytes Relative 7 (L) 14 - 44 %    Monocytes Relative 7 4 - 12 %    Eosinophils Relative 0 0 - 6 %    Basophils Relative 0 0 - 1 %    Neutrophils Absolute 9 23 (H) 1 85 - 7 62 Thousands/µL    Immature Grans Absolute 0 04 0 00 - 0 20 Thousand/uL    Lymphocytes Absolute 0 77 0 60 - 4 47 Thousands/µL    Monocytes Absolute 0 81 0 17 - 1 22 Thousand/µL    Eosinophils Absolute 0 01 0 00 - 0 61 Thousand/µL    Basophils Absolute 0 02 0 00 - 0 10 Thousands/µL   APTT    Collection Time: 11/04/22  4:51 AM   Result Value Ref Range    PTT 52 (H) 23 - 37 seconds   Protime-INR    Collection Time: 11/04/22  4:51 AM   Result Value Ref Range    Protime 69 0 (H) 11 6 - 14 5 seconds    INR 8 28 (HH) 0 84 - 1 19   Osmolality-"If this is regarding a toxic alcohol, STOP  Test is not routinely indicated   Please consult medical  on call for further guidance "    Collection Time: 11/04/22  5:08 AM   Result Value Ref Range    Osmolality Serum 334 (H) 282 - 298 mmol/KG   Fingerstick Glucose (POCT)    Collection Time: 11/04/22  7:38 AM   Result Value Ref Range    POC Glucose 163 (H) 65 - 140 mg/dl   Basic metabolic panel    Collection Time: 11/04/22  8:18 AM   Result Value Ref Range    Sodium 150 (H) 135 - 147 mmol/L    Potassium 3 4 (L) 3 5 - 5 3 mmol/L    Chloride 117 (H) 96 - 108 mmol/L    CO2 27 21 - 32 mmol/L    ANION GAP 6 4 - 13 mmol/L    BUN 59 (H) 5 - 25 mg/dL    Creatinine 1 69 (H) 0 60 - 1 30 mg/dL    Glucose 177 (H) 65 - 140 mg/dL    Calcium 8 3 8 3 - 10 1 mg/dL    eGFR 42 ml/min/1 73sq m   Protime-INR    Collection Time: 11/04/22  8:18 AM   Result Value Ref Range    Protime 75 7 (H) 11 6 - 14 5 seconds    INR 9 33 (HH) 0 84 - 1 19   Cardiolipin antibody    Collection Time: 11/04/22  8:24 AM   Result Value Ref Range    ANTICARDIOLIPIN IGG ANTIBODY 1 5 See comment    ANTICARDIOLIPIN IGA ANTIBODY >159 0 See comment    ANTICARDIOLIPIN IGM ANTIBODY 13 0 See comment   Beta-2 glycoprotein antibodies    Collection Time: 11/04/22  8:24 AM   Result Value Ref Range    BETA 2 GLYCO 1 IGG 1 2 See comment    BETA 2 GLYCO 1 IGA 3 3 See comment    BETA 2 GLYCO 1 IGM <2 4 See comment   Fingerstick Glucose (POCT)    Collection Time: 11/04/22 11:09 AM   Result Value Ref Range    POC Glucose 128 65 - 140 mg/dl   Equal mix, APTT    Collection Time: 11/04/22 12:34 PM   Result Value Ref Range    PTT Mixing Study Room Temp 27 9 24 - 36 Seconds    PTT Mixing Study Incubated 29 24 - 36 Seconds    PTT 60 (H) 23 - 37 seconds   Equal mix, PT / INR    Collection Time: 11/04/22 12:34 PM   Result Value Ref Range    PT Mixing Study Room Temp 15 2 (H) 12 0 - 14 3 Seconds    PT Mixing Study Incubated 15 5 (H) 12 0 - 14 3 SEC    Protime 66 4 (H) 11 6 - 14 5 seconds   Basic metabolic panel    Collection Time: 11/04/22  2:57 PM   Result Value Ref Range    Sodium 148 (H) 135 - 147 mmol/L    Potassium 3 3 (L) 3 5 - 5 3 mmol/L    Chloride 116 (H) 96 - 108 mmol/L    CO2 29 21 - 32 mmol/L    ANION GAP 3 (L) 4 - 13 mmol/L    BUN 52 (H) 5 - 25 mg/dL    Creatinine 1 55 (H) 0 60 - 1 30 mg/dL    Glucose 144 (H) 65 - 140 mg/dL    Calcium 8 7 8 3 - 10 1 mg/dL    eGFR 47 ml/min/1 73sq m   Protime-INR    Collection Time: 11/04/22  2:57 PM   Result Value Ref Range    Protime 62 6 (H) 11 6 - 14 5 seconds    INR 7 30 (HH) 0 84 - 1 19   CBC and differential    Collection Time: 11/04/22  2:57 PM   Result Value Ref Range    WBC 10 67 (H) 4 31 - 10 16 Thousand/uL    RBC 4 13 3 88 - 5 62 Million/uL    Hemoglobin 12 9 12 0 - 17 0 g/dL    Hematocrit 37 9 36 5 - 49 3 %    MCV 92 82 - 98 fL    MCH 31 2 26 8 - 34 3 pg    MCHC 34 0 31 4 - 37 4 g/dL    RDW 14 7 11 6 - 15 1 %    MPV 11 4 8 9 - 12 7 fL    Platelets 722 302 - 537 Thousands/uL    nRBC 0 /100 WBCs    Neutrophils Relative 84 (H) 43 - 75 %    Immat GRANS % 0 0 - 2 %    Lymphocytes Relative 8 (L) 14 - 44 %    Monocytes Relative 8 4 - 12 %    Eosinophils Relative 0 0 - 6 %    Basophils Relative 0 0 - 1 %    Neutrophils Absolute 8 90 (H) 1 85 - 7 62 Thousands/µL    Immature Grans Absolute 0 04 0 00 - 0 20 Thousand/uL    Lymphocytes Absolute 0 90 0 60 - 4 47 Thousands/µL    Monocytes Absolute 0 80 0 17 - 1 22 Thousand/µL    Eosinophils Absolute 0 01 0 00 - 0 61 Thousand/µL    Basophils Absolute 0 02 0 00 - 0 10 Thousands/µL   Fingerstick Glucose (POCT)    Collection Time: 11/04/22  4:10 PM   Result Value Ref Range    POC Glucose 128 65 - 140 mg/dl   Fingerstick Glucose (POCT)    Collection Time: 11/04/22  8:37 PM   Result Value Ref Range    POC Glucose 119 65 - 140 mg/dl   APTT    Collection Time: 11/05/22  4:56 AM   Result Value Ref Range    PTT 54 (H) 23 - 37 seconds   Protime-INR    Collection Time: 11/05/22  4:56 AM   Result Value Ref Range    Protime 59 1 (H) 11 6 - 14 5 seconds    INR 6 79 (HH) 0 84 - 1 19   CBC and differential    Collection Time: 11/05/22  4:56 AM   Result Value Ref Range    WBC 7 81 4 31 - 10 16 Thousand/uL    RBC 3 79 (L) 3 88 - 5 62 Million/uL    Hemoglobin 11 8 (L) 12 0 - 17 0 g/dL    Hematocrit 35 2 (L) 36 5 - 49 3 %    MCV 93 82 - 98 fL    MCH 31 1 26 8 - 34 3 pg    MCHC 33 5 31 4 - 37 4 g/dL    RDW 14 7 11 6 - 15 1 %    MPV 11 8 8 9 - 12 7 fL    Platelets 113 (L) 482 - 390 Thousands/uL    nRBC 0 /100 WBCs    Neutrophils Relative 79 (H) 43 - 75 %    Immat GRANS % 1 0 - 2 %    Lymphocytes Relative 11 (L) 14 - 44 %    Monocytes Relative 9 4 - 12 %    Eosinophils Relative 0 0 - 6 %    Basophils Relative 0 0 - 1 %    Neutrophils Absolute 6 21 1 85 - 7 62 Thousands/µL    Immature Grans Absolute 0 04 0 00 - 0 20 Thousand/uL    Lymphocytes Absolute 0 87 0 60 - 4 47 Thousands/µL    Monocytes Absolute 0 67 0 17 - 1 22 Thousand/µL    Eosinophils Absolute 0 01 0 00 - 0 61 Thousand/µL    Basophils Absolute 0 01 0 00 - 0 10 Thousands/µL   Basic metabolic panel    Collection Time: 11/05/22  4:56 AM   Result Value Ref Range    Sodium 145 135 - 147 mmol/L    Potassium 3 1 (L) 3 5 - 5 3 mmol/L    Chloride 114 (H) 96 - 108 mmol/L    CO2 28 21 - 32 mmol/L    ANION GAP 3 (L) 4 - 13 mmol/L    BUN 41 (H) 5 - 25 mg/dL    Creatinine 1 27 0 60 - 1 30 mg/dL    Glucose 134 65 - 140 mg/dL    Calcium 8 6 8 3 - 10 1 mg/dL    eGFR 60 ml/min/1 73sq m   Phosphorus    Collection Time: 11/05/22  4:56 AM   Result Value Ref Range    Phosphorus 2 2 (L) 2 3 - 4 1 mg/dL   Fingerstick Glucose (POCT)    Collection Time: 11/05/22  6:00 AM   Result Value Ref Range    POC Glucose 122 65 - 140 mg/dl       CT head without contrast    Result Date: 11/3/2022  Narrative: CT BRAIN - WITHOUT CONTRAST INDICATION:   AMS, weakness  COMPARISON:  CT head 4/19/2022, MRI brain 8/29/2022 TECHNIQUE:  CT examination of the brain was performed  In addition to axial images, sagittal and coronal 2D reformatted images were created and submitted for interpretation  Radiation dose length product (DLP) for this visit:  882 mGy-cm   This examination, like all CT scans performed in the [facility], was performed utilizing techniques to minimize radiation dose exposure, including the use of iterative reconstruction and automated exposure control  IMAGE QUALITY:  Diagnostic  FINDINGS: PARENCHYMA:  No intracranial mass, mass effect or midline shift  No CT signs of acute infarction  No acute parenchymal hemorrhage    Again seen is focal encephalomalacia and gliosis involving the right occipital lobe and bilateral cerebelli  Again seen are chronic lacunar infarcts in bilateral basal ganglia  Periventricular moderate patchy low-attenuation is again seen, grossly unchanged in extent compared to prior MRI brain 8/29/2022 VENTRICLES AND EXTRA-AXIAL SPACES:  Normal for the patient's age  VISUALIZED ORBITS AND PARANASAL SINUSES:  Unremarkable  CALVARIUM AND EXTRACRANIAL SOFT TISSUES:  Normal      Impression: No acute intracranial abnormality  Encephalomalacia involving the right occipital lobe and bilateral cerebelli  Grossly stable appearance of periventricular hypoattenuation compared to MRI brain 8/29/2022, likely representing chronic microvascular ischemic changes  The study was marked in [facility] for immediate notification  Workstation performed: [id number]     US thyroid    Result Date: 10/10/2022  Narrative: THYROID ULTRASOUND INDICATION:    depressed TSH  COMPARISON:  CT chest 8/8/2022 TECHNIQUE:   Ultrasound of the thyroid was performed with a high frequency linear transducer in transverse and sagittal planes including volumetric imaging sweeps as well as traditional still imaging technique  FINDINGS:  Normal homogeneous smooth echotexture  Right lobe: 4 3 x 1 9 x 2 0 cm  Volume 7 6 mL Left lobe:  3 8 x 1 6 x 1 8 cm  Volume 5 5 mL Isthmus: 0 3  cm  No thyroid nodules are demonstrated  Impression: Normal examination  Reference: ACR Thyroid Imaging, Reporting and Data System (TI-RADS): White Paper of the [employer]  J AM Nile Radiol 8568;35:[id number]  (additional recommendations based on American Thyroid Association 2015 guidelines ) Workstation performed: [id number]     US right upper quadrant    Result Date: 10/12/2022  Narrative: RIGHT UPPER QUADRANT ULTRASOUND INDICATION:     hyperbilirubinemia  COMPARISON:  CT abdomen and pelvis 7/28/2022   TECHNIQUE:   Real-time ultrasound of the right upper quadrant was performed with a curvilinear transducer with both volumetric sweeps and still imaging techniques  FINDINGS: PANCREAS:  Visualized portions of the pancreas are within normal limits  AORTA AND IVC:  Visualized portions are normal for patient age  LIVER: Size:  Within normal range  The liver measures 13 1 cm in the midclavicular line  Contour:  Surface contour is smooth  Parenchyma:  Echogenicity and echotexture are within normal limits  No liver mass identified  Limited imaging of the main portal vein shows it to be patent and hepatopetal   BILIARY: Patient has undergone cholecystectomy  No intrahepatic biliary dilatation  CBD is not well visualized and measures 7 0 mm near the mathieu with tapering distally  No choledocholithiasis  KIDNEY: Right kidney measures 9 9 x 6 2 x 5 2 cm  Volume 165 1 mL Kidney within normal limits  ASCITES:   None  Impression: No acute process identified  Workstation performed: [id number]     Echo complete w/ contrast if indicated    Result Date: 10/11/2022  Narrative: •  Left Ventricle: Left ventricular cavity size is normal  Wall thickness is moderately increased  There is moderate concentric hypertrophy  The left ventricular ejection fraction is 63%  Systolic function is normal  Wall motion is normal  Diastolic function is mildly abnormal, consistent with grade I (abnormal) relaxation  •  Aortic Valve: The aortic valve is bicuspid  The leaflets are mildly thickened  The leaflets are mildly calcified  There is mildly reduced mobility  There is mild stenosis  The aortic valve peak velocity is 2 94 m/s  •  Tricuspid Valve: There is mild regurgitation  Labs and pertinent reports reviewed

## 2022-11-05 NOTE — PROGRESS NOTES
[address] [facility]  Progress Note - Teagan Rasmussen 1960, 58 y o  male MRN: [id number]  Unit/Bed#: [facility] 408-01 Encounter: [id number]  Primary Care Provider: Ayush Burrows MD   Date and time admitted to hospital: 11/3/2022  3:28 PM    * Rhabdomyolysis  Assessment & Plan  -CPK > 7000  -acute kidney injury with creatinine up to 2 5 from a normal creatinine recently  -noted hypernatremia with sodium 151  -in setting of minimal ambulation and has been going up the stairs by crawling in using his buttocks  -given 2 L normal saline in the ER  Plan  > admit to medicine on telemetry  Given hypernatremia with sodium 151 will need to hold off additional aggressive IV fluids and switch to D5W at 75 cc/hour with checking of the sodium every 6 hours  Monitor sodium every 4 hours  Consult Nephrology  > Neuro checks q 4 hours, fall precautions      Sepsis ([facility] [address] )  Assessment & Plan  -WBC 14 K, heart rate 114,  lactic acid 2 3  -chest x-ray with no obvious focal infiltrate; daughter reports diffuse weakness and at times patient coughing after eating  -urine with foul odor-awaiting UA  Plan  > infectious workup with blood cultures drawn  Start empiric ceftriaxone for possible UTI and will need to assess UA  Another potential source is developing aspiration pneumonia but no infiltrate seen on my read of chest x-ray  DAYAN (acute kidney injury) ([facility] [address] )  Assessment & Plan  -see rhabdo myolysis section    Hypernatremia  Assessment & Plan  -sodium 151, DAYAN, rhabdo  -initial thought process was this was a hypovolemic hypernatremia  -CT Head: No acute intracranial abnormality  Encephalomalacia involving the right occipital lobe and bilateral cerebelli  Grossly stable appearance of periventricular hypoattenuation compared to MRI brain 8/29/2022, likely representing chronic microvascular ischemic changes  Plan  > as above in rhabdo section      Supratherapeutic INR  Assessment & Plan  Increased increased to 9 has history of antiphospholipid syndrome and AFib  -no evidence of bleeding  Plan  · > as INR mildly elevated and no source of bleeding, will hold off giving any reversal agents of supratherapeutic INR especially given patient's history of antiphospholipid syndrome and history of 3 previous CVA as well as having ischemic   · likely etiology of of elevated INR is multifactorial   Suspect poor nutrition in conjunction with vitamin K antagonism of Coumadin as etiology of elevated INR  Patient INR trending downward with holding of Coumadin  Given INR is less than 10 and history of hypercoagulable state in the setting of stroke history would prefer to monitor and trend off of Coumadin  Patient will need dosing adjustment once INR is down    CVA (cerebral vascular accident) ([facility] [address] )  Assessment & Plan  -Hx CVA x 3 in setting of antiphospholipid syndrome  -on PTA warfarin although noted supratherapeutic INR  -CT head in ER: No acute intracranial abnormality  Encephalomalacia involving the right occipital lobe and bilateral cerebelli  Grossly stable appearance of periventricular hypoattenuation compared to MRI brain 8/29/2022, likely representing chronic microvascular ischemic changes  -on exam answers questions in a very low voice when asked, noted left upper extremity tremor that remains constant with movement, diffuse fatigue of bilateral lower extremities  Plan  Neurology consulted    Antiphospholipid antibody with hypercoagulable state ([facility] [address] )  Assessment & Plan  -history of antiphospholipid antibody on PTA Coumadin  -has supratherapeutic INR; holding warfarin  -had ischemic CVA despite being on Eliquis in the past      Atrial fibrillation ([facility] [address] )  Assessment & Plan  -on PTA carvedilol and warfarin  Plan  > continue carvedilol    Hold warfarin given supratherapeutic INR    SHIMON (generalized anxiety disorder)  Assessment & Plan  -previous admission from the end of September until the end of October at [facility] in which he was hospitalized for over a month for severe anxiety and inability to care for self  -per sister reportedly was able to ambulate prior to the hospitalization but after the hospitalization patient needing assistance with ambulation at home  -during hospitalization there patient developed hyponatremia during his course of stay and Zoloft that had been started was stopped  -placed on mirtazapine as well as Abilify melatonin and Ativan on discharge  Plan  > hold medications besides Abilify to avoid causing CNS depression given patient's or any fatigued state  > consider Psychiatry consultation    VTE Pharmacologic Prophylaxis:   Pharmacologic: On  hold for supratherapeutic INR  Mechanical VTE Prophylaxis in Place: No    Patient Centered Rounds: I have performed bedside rounds with nursing staff today  Time Spent for Care: 15 minutes  More than 50% of total time spent on counseling and coordination of care as described above  Current Length of Stay: 2 day(s)    Current Patient Status: Inpatient   Certification Statement: The patient will continue to require additional inpatient hospital stay due to Need to monitor symptoms        Code Status: Level 3 - DNAR and DNI      Subjective:    patient comfortable    Objective:     Vitals:   Temp (24hrs), Av 1 °F (37 3 °C), Min:98 6 °F (37 °C), Max:99 5 °F (37 5 °C)    Temp:  [98 6 °F (37 °C)-99 5 °F (37 5 °C)] 98 9 °F (37 2 °C)  HR:  [] 98  Resp:  [15-25] 22  BP: (109-145)/(73-85) 145/85  SpO2:  [94 %-99 %] 99 %  Body mass index is 28 94 kg/m²  Input and Output Summary (last 24 hours): Intake/Output Summary (Last 24 hours) at 2022 0935  Last data filed at 2022 0600  Gross per 24 hour   Intake 3889 17 ml   Output 1150 ml   Net 2739 17 ml       Physical Exam:     Physical Exam  Constitutional:       Appearance: Normal appearance  HENT:      Head: Normocephalic and atraumatic     Cardiovascular:      Rate and Rhythm: Normal rate and regular rhythm  Pulmonary:      Effort: Pulmonary effort is normal       Breath sounds: Normal breath sounds  Skin:     General: Skin is warm  Neurological:      General: No focal deficit present  Mental Status: He is alert and oriented to person, place, and time  Additional Data:     Labs:    Results from last 7 days   Lab Units 11/05/22  0456   WBC Thousand/uL 7 81   HEMOGLOBIN g/dL 11 8*   HEMATOCRIT % 35 2*   PLATELETS Thousands/uL 144*   NEUTROS PCT % 79*   LYMPHS PCT % 11*   MONOS PCT % 9   EOS PCT % 0     Results from last 7 days   Lab Units 11/05/22  0456 11/04/22  0818 11/04/22  0000   POTASSIUM mmol/L 3 1*   < > 3 1*   CHLORIDE mmol/L 114*   < > 117*   CO2 mmol/L 28   < > 28   BUN mg/dL 41*   < > 64*   CREATININE mg/dL 1 27   < > 1 88*   CALCIUM mg/dL 8 6   < > 8 5   ALK PHOS U/L  --   --  73   ALT U/L  --   --  62   AST U/L  --   --  230*    < > = values in this interval not displayed  Results from last 7 days   Lab Units 11/05/22 0456   INR  6 79*       * I Have Reviewed All Lab Data Listed Above  * Additional Pertinent Lab Tests Reviewed: All Labs Within Last 24 Hours Reviewed      Recent Cultures (last 7 days):     Results from last 7 days   Lab Units 11/03/22  2300   BLOOD CULTURE  No Growth at 24 hrs  No Growth at 24 hrs         Last 24 Hours Medication List:   Current Facility-Administered Medications   Medication Dose Route Frequency Provider Last Rate   • cefTRIAXone  2,000 mg Intravenous Q24H Christopher Araujo DO Stopped (11/04/22 2301)   • dextrose  125 mL/hr Intravenous Continuous Stefanie Cherie Macdonald  mL/hr (19/66/95 1123)   • folic acid 1 mg, thiamine 100 mg in 0 9% sodium chloride 100 mL IVPB   Intravenous Daily Christopher Araujo  mL/hr at 11/04/22 0436   • insulin lispro  1-5 Units Subcutaneous TID THANIA Zapien DO     • pantoprazole  40 mg Oral BID Christopher Araujo DO          Today, Patient Was Seen By: Orin Figueroa    ** Please Note: Dictation voice to text software may have been used in the creation of this document   **

## 2022-11-06 LAB
ANION GAP SERPL CALCULATED.3IONS-SCNC: 4 MMOL/L (ref 4–13)
APTT PPP: 43 SECONDS (ref 23–37)
BASOPHILS # BLD AUTO: 0.02 THOUSANDS/ÂΜL (ref 0–0.1)
BASOPHILS NFR BLD AUTO: 0 % (ref 0–1)
BUN SERPL-MCNC: 23 MG/DL (ref 5–25)
CALCIUM SERPL-MCNC: 8.2 MG/DL (ref 8.3–10.1)
CHLORIDE SERPL-SCNC: 109 MMOL/L (ref 96–108)
CK MB SERPL-MCNC: 2.1 NG/ML (ref 0–5)
CK MB SERPL-MCNC: <1 % (ref 0–2.5)
CK SERPL-CCNC: 1193 U/L (ref 39–308)
CO2 SERPL-SCNC: 28 MMOL/L (ref 21–32)
CREAT SERPL-MCNC: 0.96 MG/DL (ref 0.6–1.3)
EOSINOPHIL # BLD AUTO: 0.13 THOUSAND/ÂΜL (ref 0–0.61)
EOSINOPHIL NFR BLD AUTO: 2 % (ref 0–6)
ERYTHROCYTE [DISTWIDTH] IN BLOOD BY AUTOMATED COUNT: 14.4 % (ref 11.6–15.1)
GFR SERPL CREATININE-BSD FRML MDRD: 84 ML/MIN/1.73SQ M
GLUCOSE SERPL-MCNC: 106 MG/DL (ref 65–140)
GLUCOSE SERPL-MCNC: 117 MG/DL (ref 65–140)
GLUCOSE SERPL-MCNC: 131 MG/DL (ref 65–140)
GLUCOSE SERPL-MCNC: 91 MG/DL (ref 65–140)
GLUCOSE SERPL-MCNC: 94 MG/DL (ref 65–140)
HCT VFR BLD AUTO: 36.3 % (ref 36.5–49.3)
HGB BLD-MCNC: 11.9 G/DL (ref 12–17)
IMM GRANULOCYTES # BLD AUTO: 0.04 THOUSAND/UL (ref 0–0.2)
IMM GRANULOCYTES NFR BLD AUTO: 1 % (ref 0–2)
INR PPP: 4 (ref 0.84–1.19)
LYMPHOCYTES # BLD AUTO: 1.09 THOUSANDS/ÂΜL (ref 0.6–4.47)
LYMPHOCYTES NFR BLD AUTO: 14 % (ref 14–44)
MCH RBC QN AUTO: 30.6 PG (ref 26.8–34.3)
MCHC RBC AUTO-ENTMCNC: 32.8 G/DL (ref 31.4–37.4)
MCV RBC AUTO: 93 FL (ref 82–98)
MONOCYTES # BLD AUTO: 0.59 THOUSAND/ÂΜL (ref 0.17–1.22)
MONOCYTES NFR BLD AUTO: 7 % (ref 4–12)
NEUTROPHILS # BLD AUTO: 6.18 THOUSANDS/ÂΜL (ref 1.85–7.62)
NEUTS SEG NFR BLD AUTO: 76 % (ref 43–75)
NRBC BLD AUTO-RTO: 0 /100 WBCS
PLATELET # BLD AUTO: 130 THOUSANDS/UL (ref 149–390)
PMV BLD AUTO: 12.1 FL (ref 8.9–12.7)
POTASSIUM SERPL-SCNC: 3.1 MMOL/L (ref 3.5–5.3)
PROTHROMBIN TIME: 39.2 SECONDS (ref 11.6–14.5)
RBC # BLD AUTO: 3.89 MILLION/UL (ref 3.88–5.62)
SODIUM SERPL-SCNC: 141 MMOL/L (ref 135–147)
WBC # BLD AUTO: 8.05 THOUSAND/UL (ref 4.31–10.16)

## 2022-11-06 RX ORDER — POTASSIUM CHLORIDE 14.9 MG/ML
20 INJECTION INTRAVENOUS
Status: DISPENSED | OUTPATIENT
Start: 2022-11-06 | End: 2022-11-06

## 2022-11-06 RX ORDER — POTASSIUM CHLORIDE 20MEQ/15ML
40 LIQUID (ML) ORAL ONCE
Status: DISCONTINUED | OUTPATIENT
Start: 2022-11-06 | End: 2022-11-06 | Stop reason: CLARIF

## 2022-11-06 RX ORDER — DEXTROSE, SODIUM CHLORIDE, SODIUM LACTATE, POTASSIUM CHLORIDE, AND CALCIUM CHLORIDE 5; .6; .31; .03; .02 G/100ML; G/100ML; G/100ML; G/100ML; G/100ML
50 INJECTION, SOLUTION INTRAVENOUS CONTINUOUS
Status: DISCONTINUED | OUTPATIENT
Start: 2022-11-06 | End: 2022-11-08

## 2022-11-06 RX ORDER — POTASSIUM CHLORIDE 20 MEQ/1
40 TABLET, EXTENDED RELEASE ORAL ONCE
Status: COMPLETED | OUTPATIENT
Start: 2022-11-06 | End: 2022-11-06

## 2022-11-06 RX ADMIN — CEFTRIAXONE SODIUM 2000 MG: 10 INJECTION, POWDER, FOR SOLUTION INTRAVENOUS at 21:41

## 2022-11-06 RX ADMIN — THIAMINE HYDROCHLORIDE: 100 INJECTION, SOLUTION INTRAMUSCULAR; INTRAVENOUS at 08:53

## 2022-11-06 RX ADMIN — DEXTROSE, SODIUM CHLORIDE, SODIUM LACTATE, POTASSIUM CHLORIDE, AND CALCIUM CHLORIDE 50 ML/HR: 5; .6; .31; .03; .02 INJECTION, SOLUTION INTRAVENOUS at 07:09

## 2022-11-06 RX ADMIN — PANTOPRAZOLE SODIUM 40 MG: 40 TABLET, DELAYED RELEASE ORAL at 08:58

## 2022-11-06 RX ADMIN — POTASSIUM CHLORIDE 40 MEQ: 1500 TABLET, EXTENDED RELEASE ORAL at 12:02

## 2022-11-06 RX ADMIN — DEXTROSE, SODIUM CHLORIDE, SODIUM LACTATE, POTASSIUM CHLORIDE, AND CALCIUM CHLORIDE 50 ML/HR: 5; .6; .31; .03; .02 INJECTION, SOLUTION INTRAVENOUS at 17:37

## 2022-11-06 RX ADMIN — PANTOPRAZOLE SODIUM 40 MG: 40 TABLET, DELAYED RELEASE ORAL at 17:39

## 2022-11-06 NOTE — PHYSICAL THERAPY NOTE
Physical Therapy Evaluation    Patient Name: Anali Urbina    CSNIV'G Date: 11/6/2022     Problem List  Principal Problem:    Rhabdomyolysis  Active Problems:    SHIMON (generalized anxiety disorder)    Atrial fibrillation (HCC)    Antiphospholipid antibody with hypercoagulable state ([facility] )    Tremor of right hand    CVA (cerebral vascular accident) ([facility] )    Supratherapeutic INR    Hypernatremia    DAYAN (acute kidney injury) ([facility] )    Sepsis ([facility] )       Past Medical History  Past Medical History:   Diagnosis Date   • Aneurysm of thoracic aorta     last assessed 11/20/17   • Anxiety     currently on no meds   • Arthritis    • Bilateral inguinal hernia    • Cardiac disease    • Cognitive impairment    • CVA (cerebral vascular accident) ([place] [address] )    • Depression    • GERD (gastroesophageal reflux disease)    • Hearing loss of aging    • Heart disease    • Hyperlipidemia    • Hypertension    • Irritable bowel syndrome    • Kidney stone    • Obesity    • Occasional tremors     left arm since stroke   • Palpitations    • Panic attack    • PONV (postoperative nausea and vomiting)     and headache x 3 days   • Psychiatric illness    • Sciatica     right and left  side   • Shortness of breath     on exertion   • Sleep apnea     is not using a CPAP   • Sleep difficulties    • Spitting up blood 05/21/2021    Resolved   • Status post placement of implantable loop recorder    • Stroke ([facility] ) 11/2014   • Stroke ([place] [address] ) 03/2021   • TIA (transient ischemic attack)         Past Surgical History  Past Surgical History:   Procedure Laterality Date   • AORTA SURGERY      thoracic - aneurysmorrhaphy   • APPENDECTOMY     • ASCENDING AORTIC ANEURYSM REPAIR      resolved 8/19/15   • CARDIAC LOOP RECORDER     • CHOLECYSTECTOMY      laparoscopic   • COLONOSCOPY     • CYSTOSCOPY      with removal of object- stent removal   • KNEE ARTHROSCOPY Right 1996    with medial meniscus repair   • LITHOTRIPSY renal   • ORTHOPEDIC SURGERY     • [place] FRAGMENT KIDNEY STONE/ ESWL Left 5/17/2018    Procedure: LITHROTRIPSY EXTRACORPORAL SHOCKWAVE (ESWL); Surgeon: Ashlee Allen MD;  Location: [facility]  MAIN OR;  Service: Urology   • [place] [address] Bilateral 12/9/2021    Procedure: ROBOTIC REPAIR HERNIA INGUINAL;  Surgeon: Jose Avendaño MD;  Location: [facility] Main OR;  Service: General   • THUMB ARTHROSCOPY Right 1976    ligament repair   • UPPER GASTROINTESTINAL ENDOSCOPY             11/06/22 0940   PT Last Visit   PT Visit Date 11/06/22   Note Type   Note type Evaluation   Pain Assessment   Pain Assessment Tool FLACC   Pain Rating: FLACC (Rest) - Face 0   Pain Rating: FLACC (Rest) - Legs 0   Pain Rating: FLACC (Rest) - Activity 0   Pain Rating: FLACC (Rest) - Cry 0   Pain Rating: FLACC (Rest) - Consolability 0   Score: FLACC (Rest) 0   Pain Rating: FLACC (Activity) - Face 1   Pain Rating: FLACC (Activity) - Legs 1   Pain Rating: FLACC (Activity) - Activity 0   Pain Rating: FLACC (Activity) - Cry 0   Pain Rating: FLACC (Activity) - Consolability 0   Score: FLACC (Activity) 2   Home Living   Type of Home House   Home Layout Two level;Stairs to enter with rails  (6)   Additional Comments Patient is a poor historian 2* cogntive deficits  Per chart, pt resides with his sister  Typically independent with mobility but over the past few weeks, pt has required increased assistance for activity  He has history of CVA with R sided weakness   Prior Function   Level of [place] Independent with IADLS;Needs assistance with ADLs   Lives With   (Sister)   Cognition   Overall Cognitive Status Impaired   Arousal/Participation Lethargic   Orientation Level Oriented to person;Oriented to place; Disoriented to situation;Disoriented to time   Memory Decreased recall of precautions   Following Commands Follows one step commands inconsistently   Comments Flat affect - speak very slow   Increased time to answer/respond to all commands   RLE Assessment   RLE Assessment   (Grossly 3/5)   LLE Assessment   LLE Assessment WFL   Bed Mobility   Supine to Sit 2  Maximal assistance   Additional items Assist x 1; Increased time required;Verbal cues   Sit to Supine 3  Moderate assistance   Additional items Assist x 1; Increased time required;Verbal cues;LE management   Additional Comments Required max A x 1 to move BLE and upper body into upright sititng position  Upon sitting up, pt refused OOB mobility and return back to supine - required assistance to move BLE   Transfers   Additional Comments Not appropriate   Balance   Static Sitting Poor   Dynamic Sitting Poor   Activity Tolerance   Activity Tolerance Patient limited by fatigue;Treatment limited secondary to medical complications (Comment)   Nurse Made Aware Yes, cleared for PT   Assessment   Prognosis Guarded   Problem List Decreased strength;Decreased endurance; Impaired balance;Decreased mobility; Impaired judgement;Decreased cognition;Decreased safety awareness   Assessment Pt is 58 y o  male seen for high complexity PT evaluation s/p admission to \[facility]\"" on 11/3/22 for worsening fatigue and increased anxiety etiology thought to be rhabdomyolysis  Comorbidities affecting pt's physical performance at time of assessment include: sepsis, DAYAN, hypernatremia, history of 3 CVA, SHIMON, and A fib  Pt is unreliable historian at this time due to cognitive deficits  Per chart, pt resides with his sister in a house with 6 SHELDON  Reports he required increase assistance to complete ADLs and ambulation over the past few weeks  Pt presented with a flat affect and required increased time to respond to all commands/questions  He required max A x 1 for bed mobility  Upon sitting up in bed , pt refused to get OOB and return back to supine    Patient's decreased mobility level and increased fall risk is secondary to deficits in cognition, response time, generalized weakness, deconditioning, ability to follow commands, sitting balance, and activity tolerance  Current clinical presentation is unstable/unpredictable seen in pt's presentation of ongoing medical management, increased reliance on assistance compared to PLOF, impaired judgement/safety awareness, and significant PMH  Pt to benefit from continued PT tx to address deficits as defined above and maximize level of functional independent mobility and consistency  From PT/mobility standpoint, recommendation at time of d/c would be STR pending progress in order to facilitate return to PLOF  Barriers to Discharge Inaccessible home environment;Decreased caregiver support   Goals   Patient Goals None stated   [facility] Expiration Date 11/16/22   Short Term Goal #1 In 1-2 weeks, the patient will complete the following 1) Perform all aspect of bed mobility independently 2) Patient will tolerate sitting EOB for greater than 15 minutes  4) Patient will tolerate greater than 30 minutes of physical activity 5) Patient will improve dynamic sitting balance from poor  to fair + in order to perform everyday activities  6) Patient will continue with ongoing rehab services for family education, DME, and D/C planning 7) PT to see for OOB mobility goals   Plan   Treatment/Interventions LE strengthening/ROM; Functional transfer training; Endurance training;Patient/family training;Bed mobility;Cognitive reorientation; Therapeutic exercise   PT Frequency 3-5x/wk   Recommendation   PT Discharge Recommendation Post acute rehabilitation services   AM-PAC Basic Mobility Inpatient   Turning in Bed Without Bedrails 2   Lying on Back to Sitting on Edge of Flat Bed 1   Moving Bed to Chair 1   Standing Up From Chair 1   Walk in Room 1   Climb 3-5 Stairs 1   Basic Mobility Inpatient Raw Score 7   Turning Head Towards Sound 3   Follow Simple Instructions 2   Low Function Basic Mobility Raw Score 12   Low Function Basic Mobility Standardized Score 18 33   Highest Level Of Mobility   -[facility] Goal 2: Bed activities/Dependent transfer   [address] [facility] Achieved 3: Sit at edge of bed   End of Consult   Patient Position at End of Consult Supine;Bed/Chair alarm activated; All needs within reach       Jesús Tirado PT, DPT

## 2022-11-06 NOTE — ASSESSMENT & PLAN NOTE
Increased increased to 9 has history of antiphospholipid syndrome and AFib  -no evidence of bleeding  Plan  · > as INR mildly elevated and no source of bleeding, will hold off giving any reversal agents of supratherapeutic INR especially given patient's history of antiphospholipid syndrome and history of 3 previous CVA as well as having ischemic   · likely etiology of of elevated INR is multifactorial   Suspect poor nutrition in conjunction with vitamin K antagonism of Coumadin as etiology of elevated INR  Patient INR trending downward with holding of Coumadin  Given INR is less than 10 and history of hypercoagulable state in the setting of stroke history would prefer to monitor and trend off of Coumadin  Will likely restart Coumadin in next 1 to 2 days if INR continues to trend downward  Will likely restart at 3 mg daily tentatively tomorrow  INR in a m

## 2022-11-06 NOTE — ASSESSMENT & PLAN NOTE
-sodium 151, DAYAN, rhabdo on admission  Note Nephrology input  Slowly improving   -initial thought process was this was a hypovolemic hypernatremia  -CT Head: No acute intracranial abnormality  Encephalomalacia involving the right occipital lobe and bilateral cerebelli  Grossly stable appearance of periventricular hypoattenuation compared to MRI brain 8/29/2022, likely representing chronic microvascular ischemic changes  Plan  > as above in rhabdo section

## 2022-11-06 NOTE — ASSESSMENT & PLAN NOTE
Outpatient follow-up with Neurology  No inpatient intervention as per Neurology  Will follow-up with Dr Sharee Nieto as outpatient  Patient known to him for prior stroke

## 2022-11-06 NOTE — PROGRESS NOTES
NEPHROLOGY PROGRESS NOTE   John Fried 58 y o  male MRN: [id number]  Unit/Bed#: [facility] 408-01 Encounter: [id number]  Reason for Consult:  Acute kidney injury    Assessment / Plan :  1  Acute kidney injury, most likely secondary to prerenal azotemia given diuretics and poor oral intake, overall has improving with IV fluids  2  Mild rhabdomyolysis, improving currently 1,193  3  Hypernatremia secondary to impaired free water intake and ongoing diuretic use, resolved  4  Suspected sepsis currently on empiric antibiotic treatment  5  Hypokalemia, continue with replacement as needed  6  History of CVA  7  History of antiphospholipid syndrome    PLAN:  · Overall renal function has continued to improve  · Sodium level has normalized  · Continue with potassium replacement as needed  · Change IV fluids to D5 LR at 50 cc/hour until oral intake is adequate  · No other changes from Nephrology standpoint, will sign off at this time    SUBJECTIVE:  Seen and examined  Patient is awake  Needs assistance with eating  Does not appear short of breath  No active chest pain  Review of Systems    OBJECTIVE:  Current Weight: Weight - Scale: 86 3 kg (190 lb 4 8 oz)  Vitals:    11/05/22 1918 11/05/22 2244 11/06/22 0250 11/06/22 0742   BP: 149/78 97/60 96/63 167/87   BP Location: Right arm Right arm Right arm Right arm   Pulse: 100 90 81 92   Resp: 17 18 17 17   Temp: 99 1 °F (37 3 °C) 98 5 °F (36 9 °C) 98 5 °F (36 9 °C) 98 9 °F (37 2 °C)   TempSrc: Oral Oral Oral Axillary   SpO2: 99% 98% 99% 99%   Weight:       Height:           Intake/Output Summary (Last 24 hours) at 11/6/2022 0829  Last data filed at 11/6/2022 0751  Gross per 24 hour   Intake 1236 ml   Output 1295 ml   Net -59 ml       Physical Exam  Eyes:      General: No scleral icterus  Cardiovascular:      Rate and Rhythm: Normal rate and regular rhythm  Pulmonary:      Effort: Pulmonary effort is normal       Breath sounds: Normal breath sounds     Abdominal: General: There is no distension  Palpations: Abdomen is soft  Musculoskeletal:      Right lower leg: No edema  Left lower leg: No edema  Skin:     General: Skin is warm and dry  Neurological:      Mental Status: He is alert  Mental status is at baseline           Medications:    Current Facility-Administered Medications:   •  ceftriaxone (ROCEPHIN) 2 g/50 mL in dextrose IVPB, 2,000 mg, Intravenous, Q24H, Steffany Baez DO, Last Rate: 100 mL/hr at 11/05/22 2224, 2,000 mg at 11/05/22 2224  •  dextrose 5 % in lactated Ringer's infusion, 50 mL/hr, Intravenous, Continuous, [facility], Last Rate: 50 mL/hr at 11/06/22 0709, 50 mL/hr at 20/04/89 2656  •  folic acid 1 mg, thiamine (VITAMIN B1) 100 mg in sodium chloride 0 9 % 100 mL IV piggyback, , Intravenous, Daily, Steffany Baez DO, Last Rate: 200 mL/hr at 11/05/22 0951, New Bag at 11/05/22 0951  •  insulin lispro (HumaLOG) 100 units/mL subcutaneous injection 1-5 Units, 1-5 Units, Subcutaneous, TID AC **AND** Fingerstick Glucose (POCT), , , TID AC, Andres Zapien DO  •  LORazepam (ATIVAN) injection 0 5 mg, 0 5 mg, Intravenous, Q6H PRN, Andres Zapien DO, 0 5 mg at 11/05/22 2224  •  pantoprazole (PROTONIX) EC tablet 40 mg, 40 mg, Oral, BID, Steffany Baez DO, 40 mg at 11/05/22 7234    Laboratory Results:  Results from last 7 days   Lab Units 11/06/22  0456 11/05/22  1456 11/05/22  0456 11/04/22  1457 11/04/22  0818 11/04/22  0345 11/04/22  0000 11/03/22  2300 11/03/22  1621   WBC Thousand/uL 8 05  --  7 81 10 67*  --  10 88*  --   --  14 91*   HEMOGLOBIN g/dL 11 9*  --  11 8* 12 9  --  12 7  --   --  17 5*   HEMATOCRIT % 36 3*  --  35 2* 37 9  --  39 3  --   --  53 6*   PLATELETS Thousands/uL 130*  --  144* 169  --  156  --   --  258   POTASSIUM mmol/L 3 1* 3 1* 3 1* 3 3* 3 4*  --  3 1* 3 1* 2 8*   CHLORIDE mmol/L 109* 111* 114* 116* 117*  --  117* 117* 109*   CO2 mmol/L 28 28 28 29 27  --  28 28 30   BUN mg/dL 23 31* 41* 52* 59*  --  64* 73* 77* CREATININE mg/dL 0 96 1 12 1 27 1 55* 1 69*  --  1 88* 2 23* 2 60*   CALCIUM mg/dL 8 2* 8 2* 8 6 8 7 8 3  --  8 5 8 9 10 2*   MAGNESIUM mg/dL  --   --   --   --   --   --  2 9*  --  3 0*   PHOSPHORUS mg/dL  --   --  2 2*  --   --   --   --   --   --

## 2022-11-06 NOTE — ASSESSMENT & PLAN NOTE
-previous admission from the end of September until the end of October at [facility] in which he was hospitalized for over a month for severe anxiety and inability to care for self  -per sister reportedly was able to ambulate prior to the hospitalization but after the hospitalization patient needing assistance with ambulation at home  -during hospitalization there patient developed hyponatremia during his course of stay and Zoloft that had been started was stopped  -placed on mirtazapine as well as Abilify melatonin and Ativan on discharge  Plan  > hold medications besides Abilify to avoid causing CNS depression given patient's or any fatigued state  > consider Psychiatry consultation

## 2022-11-06 NOTE — ASSESSMENT & PLAN NOTE
-WBC 14 K, heart rate 114,  lactic acid 2 3 on admission  -chest x-ray with no obvious focal infiltrate; daughter reports diffuse weakness and at times patient coughing after eating  Diet modified by speech therapy  -urine with foul odor-urinalysis grossly negative  Plan  Last dose of antibiotic on Tuesday  Patient is afebrile, with stable procalcitonin, negative UA and negative x-ray  Could consider silent aspiration as potential source of infection; however, imaging appears negative currently

## 2022-11-06 NOTE — PLAN OF CARE
Problem: PHYSICAL THERAPY ADULT  Goal: Performs mobility at highest level of function for planned discharge setting  See evaluation for individualized goals  Description: Treatment/Interventions: LE strengthening/ROM, Functional transfer training, Endurance training, Patient/family training, Bed mobility, Cognitive reorientation, Therapeutic exercise          See flowsheet documentation for full assessment, interventions and recommendations  11/6/2022 1236 by Jackie Allen PT  Note: Prognosis: Guarded  Problem List: Decreased strength, Decreased endurance, Impaired balance, Decreased mobility, Impaired judgement, Decreased cognition, Decreased safety awareness  Assessment: Pt is 58 y o  male seen for high complexity PT evaluation s/p admission to [facility] on 11/3/22 for worsening fatigue and increased anxiety etiology thought to be rhabdomyolysis  Comorbidities affecting pt's physical performance at time of assessment include: sepsis, DAYAN, hypernatremia, history of 3 CVA, SHIMON, and A fib  Pt is unreliable historian at this time due to cognitive deficits  Per chart, pt resides with his sister in a house with 6 SHELDON  Reports he required increase assistance to complete ADLs and ambulation over the past few weeks  Pt presented with a flat affect and required increased time to respond to all commands/questions  He required max A x 1 for bed mobility  Upon sitting up in bed , pt refused to get OOB and return back to supine  Patient's decreased mobility level and increased fall risk is secondary to deficits in cognition, response time, generalized weakness, deconditioning, ability to follow commands, sitting balance, and activity tolerance  Current clinical presentation is unstable/unpredictable seen in pt's presentation of ongoing medical management, increased reliance on assistance compared to PLOF, impaired judgement/safety awareness, and significant PMH   Pt to benefit from continued PT tx to address deficits as defined above and maximize level of functional independent mobility and consistency  From PT/mobility standpoint, recommendation at time of d/c would be STR pending progress in order to facilitate return to PLOF  Barriers to Discharge: Inaccessible home environment, Decreased caregiver support     PT Discharge Recommendation: Post acute rehabilitation services    See flowsheet documentation for full assessment  11/6/2022 1236 by Geo Early PT  Note: Prognosis: Guarded  Problem List: Decreased strength, Decreased endurance, Impaired balance, Decreased mobility, Impaired judgement, Decreased cognition, Decreased safety awareness  Assessment: Pt is 58 y o  male seen for high complexity PT evaluation s/p [facility] on 11/3/22 for worsening fatigue and increased anxiety etiology thought to be rhabdomyolysis  Comorbidities affecting pt's physical performance at time of assessment include: sepsis, DAYAN, hypernatremia, history of 3 CVA, SHIMON, and A fib  Pt is unreliable historian at this time due to cognitive deficits  Per chart, pt resides with his sister in a house with 6 SHELDON  Reports he required increase assistance to complete ADLs and ambulation over the past few weeks  Pt presented with a flat affect and required increased time to respond to all commands/questions  He required max A x 1 for bed mobility  Upon sitting up in bed , pt refused to get OOB and return back to supine  Patient's decreased mobility level and increased fall risk is secondary to deficits in cognition, response time, generalized weakness, deconditioning, ability to follow commands, sitting balance, and activity tolerance  Current clinical presentation is unstable/unpredictable seen in pt's presentation of ongoing medical management, increased reliance on assistance compared to PLOF, impaired judgement/safety awareness, and significant PMH   Pt to benefit from continued PT tx to address deficits as defined above and maximize level of functional independent mobility and consistency  From PT/mobility standpoint, recommendation at time of d/c would be STR pending progress in order to facilitate return to PLOF  Barriers to Discharge: Inaccessible home environment, Decreased caregiver support     PT Discharge Recommendation: Post acute rehabilitation services    See flowsheet documentation for full assessment

## 2022-11-06 NOTE — PROGRESS NOTES
[address] [facility]  Progress Note - Salina Beltrán 1960, 58 y o  male MRN: [id number]  Unit/Bed#: [facility] 408-01 Encounter: [id number]  Primary Care Provider: Nena Stevens MD   Date and time admitted to hospital: 11/3/2022  3:28 PM    * Rhabdomyolysis  Assessment & Plan  -CPK > 7000 on admission  Monitor renal function closely with Nephrology  Plan  > sodium slowly improving  No status appears to be improving      Sepsis ([facility] [address] )  Assessment & Plan  -WBC 14 K, heart rate 114,  lactic acid 2 3 on admission  -chest x-ray with no obvious focal infiltrate; daughter reports diffuse weakness and at times patient coughing after eating  Diet modified by speech therapy  -urine with foul odor-urinalysis grossly negative  Plan  Last dose of antibiotic on Tuesday  Patient is afebrile, with stable procalcitonin, negative UA and negative x-ray  Could consider silent aspiration as potential source of infection; however, imaging appears negative currently  DAYAN (acute kidney injury) ([facility] [address] )  Assessment & Plan  -see rhabdomyolysis section    Hypernatremia  Assessment & Plan  -sodium 151, DAYAN, rhabdo on admission  Note Nephrology input  Slowly improving   -initial thought process was this was a hypovolemic hypernatremia  -CT Head: No acute intracranial abnormality  Encephalomalacia involving the right occipital lobe and bilateral cerebelli  Grossly stable appearance of periventricular hypoattenuation compared to MRI brain 8/29/2022, likely representing chronic microvascular ischemic changes  Plan  > as above in rhabdo section      Supratherapeutic INR  Assessment & Plan  Increased increased to 9 has history of antiphospholipid syndrome and AFib  -no evidence of bleeding  Plan  · > as INR mildly elevated and no source of bleeding, will hold off giving any reversal agents of supratherapeutic INR especially given patient's history of antiphospholipid syndrome and history of 3 previous CVA as well as having ischemic   · likely etiology of of elevated INR is multifactorial   Suspect poor nutrition in conjunction with vitamin K antagonism of Coumadin as etiology of elevated INR  Patient INR trending downward with holding of Coumadin  Given INR is less than 10 and history of hypercoagulable state in the setting of stroke history would prefer to monitor and trend off of Coumadin  Will likely restart Coumadin in next 1 to 2 days if INR continues to trend downward  Will likely restart at reduced dosing of 3 mg daily tentatively tomorrow  INR in a m  CVA (cerebral vascular accident) ([facility] [address] )  Assessment & Plan  -Hx CVA x 3 in setting of antiphospholipid syndrome  -on PTA warfarin although noted supratherapeutic INR  -CT head in ER: No acute intracranial abnormality  Encephalomalacia involving the right occipital lobe and bilateral cerebelli  Grossly stable appearance of periventricular hypoattenuation compared to MRI brain 8/29/2022, likely representing chronic microvascular ischemic changes  -on exam answers questions in a very low voice when asked, noted left upper extremity tremor that remains constant with movement, diffuse fatigue of bilateral lower extremities  Plan  Neurology consulted    Tremor of right hand  Assessment & Plan  Outpatient follow-up with Neurology  No inpatient intervention as per Neurology  Will follow-up with Dr Win Forte as outpatient  Patient known to him for prior stroke  Antiphospholipid antibody with hypercoagulable state ([facility] [address] )  Assessment & Plan  -history of antiphospholipid antibody on PTA Coumadin  -has supratherapeutic INR; holding warfarin  -had ischemic CVA despite being on Eliquis in the past      Atrial fibrillation ([facility] [address] )  Assessment & Plan  -on PTA carvedilol and warfarin  Plan  > continue carvedilol  Hold warfarin given supratherapeutic INR  Will target INR between 2 5 in 3 5 for hypercoagulable state    Will likely restart Coumadin in the next 1 to 2 days at lower dosing of 3 mg daily    SHIMON (generalized anxiety disorder)  Assessment & Plan  -previous admission from the end of September until the end of October at [facility] in which he was hospitalized for over a month for severe anxiety and inability to care for self  -per sister reportedly was able to ambulate prior to the hospitalization but after the hospitalization patient needing assistance with ambulation at home  -during hospitalization there patient developed hyponatremia during his course of stay and Zoloft that had been started was stopped  -placed on mirtazapine as well as Abilify melatonin and Ativan on discharge  Plan  > hold medications besides Abilify to avoid causing CNS depression given patient's or any fatigued state  > consider Psychiatry consultation if symptoms worsen      VTE Pharmacologic Prophylaxis:       Time Spent for Care: 15 minutes  More than 50% of total time spent on counseling and coordination of care as described above  Current Length of Stay: 3 day(s)      Code Status: Level 3 - DNAR and DNI      Subjective:   Patient comfortable    Objective:     Vitals:   Temp (24hrs), Av 9 °F (37 2 °C), Min:98 5 °F (36 9 °C), Max:99 6 °F (37 6 °C)    Temp:  [98 5 °F (36 9 °C)-99 6 °F (37 6 °C)] 98 9 °F (37 2 °C)  HR:  [] 92  Resp:  [14-24] 17  BP: ()/(60-87) 167/87  SpO2:  [98 %-99 %] 99 %  Body mass index is 28 94 kg/m²  Input and Output Summary (last 24 hours):        Intake/Output Summary (Last 24 hours) at 2022 0921  Last data filed at 2022 0751  Gross per 24 hour   Intake 1236 ml   Output 1295 ml   Net -59 ml       Physical Exam:     Physical Exam    Additional Data:     Labs:    Results from last 7 days   Lab Units 22  0456   WBC Thousand/uL 8 05   HEMOGLOBIN g/dL 11 9*   HEMATOCRIT % 36 3*   PLATELETS Thousands/uL 130*   NEUTROS PCT % 76*   LYMPHS PCT % 14   MONOS PCT % 7   EOS PCT % 2     Results from last 7 days   Lab Units 22  0456 11/04/22  0818 11/04/22  0000   POTASSIUM mmol/L 3 1*   < > 3 1*   CHLORIDE mmol/L 109*   < > 117*   CO2 mmol/L 28   < > 28   BUN mg/dL 23   < > 64*   CREATININE mg/dL 0 96   < > 1 88*   CALCIUM mg/dL 8 2*   < > 8 5   ALK PHOS U/L  --   --  73   ALT U/L  --   --  62   AST U/L  --   --  230*    < > = values in this interval not displayed  Results from last 7 days   Lab Units 11/06/22  0456   INR  4 00*       * I Have Reviewed All Lab Data Listed Above  * Additional Pertinent Lab Tests Reviewed: All Labs Within Last 24 Hours Reviewed        Recent Cultures (last 7 days):     Results from last 7 days   Lab Units 11/03/22  2300   BLOOD CULTURE  No Growth at 48 hrs  No Growth at 48 hrs  Last 24 Hours Medication List:   Current Facility-Administered Medications   Medication Dose Route Frequency Provider Last Rate   • cefTRIAXone  2,000 mg Intravenous Q24H Andres Zapien DO     • dextrose 5% lactated ringer's  50 mL/hr Intravenous Continuous Yolis Macdonald DO 50 mL/hr (29/12/37 2165)   • folic acid 1 mg, thiamine 100 mg in 0 9% sodium chloride 100 mL IVPB   Intravenous Daily Leif Mendieta  mL/hr at 11/06/22 1762   • insulin lispro  1-5 Units Subcutaneous TID AC Andres Zapien DO     • LORazepam  0 5 mg Intravenous Q6H PRN Andres Zapien DO     • pantoprazole  40 mg Oral BID Leif Mendieta DO     • potassium chloride  20 mEq Intravenous Q2H Denzil Fothergill, DO          Today, Patient Was Seen By: Swati Rios    ** Please Note: Dictation voice to text software may have been used in the creation of this document   **

## 2022-11-06 NOTE — ASSESSMENT & PLAN NOTE
-on PTA carvedilol and warfarin  Plan  > continue carvedilol  Hold warfarin given supratherapeutic INR  Will target INR between 2 5 in 3 5 for hypercoagulable state    Will likely restart Coumadin in the next 1 to 2 days at lower dosing of 3 mg daily

## 2022-11-07 PROBLEM — E87.0 HYPERNATREMIA: Status: RESOLVED | Noted: 2022-01-01 | Resolved: 2022-01-01

## 2022-11-07 PROBLEM — N17.9 AKI (ACUTE KIDNEY INJURY) (HCC): Status: RESOLVED | Noted: 2022-11-04 | Resolved: 2022-11-06

## 2022-11-07 LAB
ANION GAP SERPL CALCULATED.3IONS-SCNC: 4 MMOL/L (ref 4–13)
APTT PPP: 34 SECONDS (ref 23–37)
BASOPHILS # BLD AUTO: 0.01 THOUSANDS/ÂΜL (ref 0–0.1)
BASOPHILS NFR BLD AUTO: 0 % (ref 0–1)
BUN SERPL-MCNC: 18 MG/DL (ref 5–25)
CALCIUM SERPL-MCNC: 8.3 MG/DL (ref 8.3–10.1)
CHLORIDE SERPL-SCNC: 113 MMOL/L (ref 96–108)
CO2 SERPL-SCNC: 25 MMOL/L (ref 21–32)
CREAT SERPL-MCNC: 0.86 MG/DL (ref 0.6–1.3)
DME PARACHUTE DELIVERY DATE ACTUAL: NORMAL
DME PARACHUTE DELIVERY DATE REQUESTED: NORMAL
DME PARACHUTE ITEM DESCRIPTION: NORMAL
DME PARACHUTE ORDER STATUS: NORMAL
DME PARACHUTE SUPPLIER NAME: NORMAL
DME PARACHUTE SUPPLIER PHONE: NORMAL
EOSINOPHIL # BLD AUTO: 0.18 THOUSAND/ÂΜL (ref 0–0.61)
EOSINOPHIL NFR BLD AUTO: 2 % (ref 0–6)
ERYTHROCYTE [DISTWIDTH] IN BLOOD BY AUTOMATED COUNT: 14.2 % (ref 11.6–15.1)
GFR SERPL CREATININE-BSD FRML MDRD: 92 ML/MIN/1.73SQ M
GLUCOSE SERPL-MCNC: 102 MG/DL (ref 65–140)
GLUCOSE SERPL-MCNC: 106 MG/DL (ref 65–140)
GLUCOSE SERPL-MCNC: 115 MG/DL (ref 65–140)
GLUCOSE SERPL-MCNC: 117 MG/DL (ref 65–140)
HCT VFR BLD AUTO: 34.9 % (ref 36.5–49.3)
HGB BLD-MCNC: 11.8 G/DL (ref 12–17)
IMM GRANULOCYTES # BLD AUTO: 0.04 THOUSAND/UL (ref 0–0.2)
IMM GRANULOCYTES NFR BLD AUTO: 1 % (ref 0–2)
INR PPP: 2.25 (ref 0.84–1.19)
LYMPHOCYTES # BLD AUTO: 0.9 THOUSANDS/ÂΜL (ref 0.6–4.47)
LYMPHOCYTES NFR BLD AUTO: 11 % (ref 14–44)
MCH RBC QN AUTO: 31.4 PG (ref 26.8–34.3)
MCHC RBC AUTO-ENTMCNC: 33.8 G/DL (ref 31.4–37.4)
MCV RBC AUTO: 93 FL (ref 82–98)
MONOCYTES # BLD AUTO: 0.55 THOUSAND/ÂΜL (ref 0.17–1.22)
MONOCYTES NFR BLD AUTO: 7 % (ref 4–12)
NEUTROPHILS # BLD AUTO: 6.39 THOUSANDS/ÂΜL (ref 1.85–7.62)
NEUTS SEG NFR BLD AUTO: 79 % (ref 43–75)
NRBC BLD AUTO-RTO: 0 /100 WBCS
PLATELET # BLD AUTO: 152 THOUSANDS/UL (ref 149–390)
PMV BLD AUTO: 12.3 FL (ref 8.9–12.7)
POTASSIUM SERPL-SCNC: 3.5 MMOL/L (ref 3.5–5.3)
PROTHROMBIN TIME: 25.1 SECONDS (ref 11.6–14.5)
RBC # BLD AUTO: 3.76 MILLION/UL (ref 3.88–5.62)
SODIUM SERPL-SCNC: 142 MMOL/L (ref 135–147)
WBC # BLD AUTO: 8.07 THOUSAND/UL (ref 4.31–10.16)

## 2022-11-07 RX ORDER — WARFARIN SODIUM 2.5 MG/1
2.5 TABLET ORAL
Status: COMPLETED | OUTPATIENT
Start: 2022-11-07 | End: 2022-11-07

## 2022-11-07 RX ORDER — WARFARIN SODIUM 2 MG/1
2 TABLET ORAL
Status: DISCONTINUED | OUTPATIENT
Start: 2022-11-07 | End: 2022-11-07

## 2022-11-07 RX ADMIN — PANTOPRAZOLE SODIUM 40 MG: 40 TABLET, DELAYED RELEASE ORAL at 17:07

## 2022-11-07 RX ADMIN — THIAMINE HYDROCHLORIDE: 100 INJECTION, SOLUTION INTRAMUSCULAR; INTRAVENOUS at 09:24

## 2022-11-07 RX ADMIN — CEFTRIAXONE SODIUM 2000 MG: 10 INJECTION, POWDER, FOR SOLUTION INTRAVENOUS at 23:06

## 2022-11-07 RX ADMIN — DEXTROSE, SODIUM CHLORIDE, SODIUM LACTATE, POTASSIUM CHLORIDE, AND CALCIUM CHLORIDE 50 ML/HR: 5; .6; .31; .03; .02 INJECTION, SOLUTION INTRAVENOUS at 13:30

## 2022-11-07 RX ADMIN — PANTOPRAZOLE SODIUM 40 MG: 40 TABLET, DELAYED RELEASE ORAL at 09:25

## 2022-11-07 RX ADMIN — WARFARIN SODIUM 2.5 MG: 2.5 TABLET ORAL at 17:07

## 2022-11-07 NOTE — ASSESSMENT & PLAN NOTE
-CPK > 7000 on admission with DAYAN  -in setting of minimal ambulation and has been going up the stairs by crawling in using his buttocks    CPK significant improved today to 1200  DAYAN resolved   Continue D5LR 50 cc/hr until po intake improves

## 2022-11-07 NOTE — DISCHARGE INSTR - OTHER ORDERS
Skin Care Plan:  1-Apply TRIAD Paste to Sacro-Buttocks Wound TID and PRN episodes of incontinence  2-Turn/reposition q2h or when medically stable for pressure re-distribution on skin   3-Elevate heels to offload pressure  4-Moisturize skin daily with skin nourishing cream  5-Ehob cushion in chair when out of bed    6-Preventative hydraguard to bilateral heels BID and PRN    7-P-500 Specialty mattress ordered for patient

## 2022-11-07 NOTE — PLAN OF CARE
Problem: PHYSICAL THERAPY ADULT  Goal: Performs mobility at highest level of function for planned discharge setting  See evaluation for individualized goals  Description: Treatment/Interventions: LE strengthening/ROM, Functional transfer training, Endurance training, Patient/family training, Bed mobility, Cognitive reorientation, Therapeutic exercise          See flowsheet documentation for full assessment, interventions and recommendations  Outcome: Progressing  Note: Prognosis: Guarded  Problem List: Decreased strength, Decreased mobility, Impaired balance, Decreased endurance, Obesity, Decreased cognition, Impaired judgement, Decreased safety awareness  Assessment: Pt cont to demonstrate overall weakness, deconditioning, decreased balance and associated mobility deficits w/ mod (A)x1-2 required for transfers and amb to assure safety; overall mobility status currently remains to be below premorbid level --> cont to recommend rehab upon D/C; will follow  Barriers to Discharge: Inaccessible home environment     PT Discharge Recommendation: Post acute rehabilitation services    See flowsheet documentation for full assessment

## 2022-11-07 NOTE — ASSESSMENT & PLAN NOTE
-WBC 14 K, heart rate 114,  lactic acid 2 3 on admission  -chest x-ray with no obvious focal infiltrate; daughter reports diffuse weakness and at times patient coughing after eating  Diet modified by speech therapy  -urine with foul odor-urinalysis grossly negative    Plan   Patient is afebrile, with stable procalcitonin, negative UA and negative x-ray     Could consider silent aspiration as potential source of infection; however, imaging appears negative currently  Continue CTX day 2 of 3

## 2022-11-07 NOTE — ASSESSMENT & PLAN NOTE
Increased increased to 9 has history of antiphospholipid syndrome and AFib  -no evidence of bleeding  Plan  · > as INR mildly elevated and no source of bleeding, will hold off giving any reversal agents of supratherapeutic INR especially given patient's history of antiphospholipid syndrome and history of 3 previous CVA as well as having ischemic   · likely etiology of of elevated INR is multifactorial   Suspect poor nutrition in conjunction with vitamin K antagonism of Coumadin as etiology of elevated INR  Patient INR trending downward with holding of Coumadin  Given INR is less than 10 and history of hypercoagulable state in the setting of stroke history would prefer to monitor and trend off of Coumadin    INR today 2 25 Restart warfarin at 2 5 mg daily

## 2022-11-07 NOTE — PROGRESS NOTES
[address] [facility]  Progress Note - Gage Cervantes 1960, 58 y o  male MRN: [id number]  Unit/Bed#: [facility] 408-01 Encounter: [id number]  Primary Care Provider: Dina Tripathi MD   Date and time admitted to hospital: 11/3/2022  3:28 PM    * Rhabdomyolysis  Assessment & Plan  -CPK > 7000 on admission with DAYAN  -in setting of minimal ambulation and has been going up the stairs by crawling in using his buttocks    CPK significant improved today to 1200  DAYAN resolved   Continue D5LR 50 cc/hr until po intake improves     Sepsis ([address] )  Assessment & Plan  -WBC 14 K, heart rate 114,  lactic acid 2 3 on admission  -chest x-ray with no obvious focal infiltrate; daughter reports diffuse weakness and at times patient coughing after eating  Diet modified by speech therapy  -urine with foul odor-urinalysis grossly negative    Plan   Patient is afebrile, with stable procalcitonin, negative UA and negative x-ray  Could consider silent aspiration as potential source of infection; however, imaging appears negative currently  Continue CTX day 2 of 3    Supratherapeutic INR  Assessment & Plan  Increased increased to 9 has history of antiphospholipid syndrome and AFib  -no evidence of bleeding  Plan  · > as INR mildly elevated and no source of bleeding, will hold off giving any reversal agents of supratherapeutic INR especially given patient's history of antiphospholipid syndrome and history of 3 previous CVA as well as having ischemic   · likely etiology of of elevated INR is multifactorial   Suspect poor nutrition in conjunction with vitamin K antagonism of Coumadin as etiology of elevated INR  Patient INR trending downward with holding of Coumadin  Given INR is less than 10 and history of hypercoagulable state in the setting of stroke history would prefer to monitor and trend off of Coumadin    INR today 2 25 Restart warfarin at 2 5 mg daily     CVA (cerebral vascular accident) [facility])  Assessment & Plan  -Hx CVA x 3 in setting of antiphospholipid syndrome  -on PTA warfarin although noted supratherapeutic INR  -CT head in ER: No acute intracranial abnormality  Encephalomalacia involving the right occipital lobe and bilateral cerebelli  Grossly stable appearance of periventricular hypoattenuation compared to MRI brain 8/29/2022, likely representing chronic microvascular ischemic changes  -on exam answers questions in a very low voice when asked, noted left upper extremity tremor that remains constant with movement, diffuse fatigue of bilateral lower extremities    PT/ ot consulted and recommending rehab    Tremor of right hand  Assessment & Plan  Outpatient follow-up with Neurology  No inpatient intervention as per Neurology  Will follow-up with Dr Briseida Reyes as outpatient  Patient known to him for prior stroke  Antiphospholipid antibody with hypercoagulable state ([facility] )  Assessment & Plan  -history of antiphospholipid antibody on PTA Coumadin  -had ischemic CVA despite being on Eliquis in the past  -restarting warfarin as noted above      Atrial fibrillation ([facility] )  Assessment & Plan  -on PTA carvedilol and warfarin  Plan  > continue carvedilol  Hold warfarin given supratherapeutic INR    Will target INR between 2 5 in 3 5 for hypercoagulable state  [facility]    SHIMON (generalized anxiety disorder)  Assessment & Plan  -previous admission from the end of September until the end of October at [address] [place] [address] in which he was hospitalized for over a month for severe anxiety and inability to care for self  -per sister reportedly was able to ambulate prior to the hospitalization but after the hospitalization patient needing assistance with ambulation at home  -during hospitalization there patient developed hyponatremia during his course of stay and Zoloft that had been started was stopped  -placed on mirtazapine as well as Abilify melatonin and Ativan on discharge    Restart Abilify     DAYAN (acute kidney injury) (HCC)-resolved as of 2022  Assessment & Plan  -see rhabdomyolysis section    Hypernatremia-resolved as of 2022  Assessment & Plan  -sodium 151, DAYAN, rhabdo on admission  Note Nephrology input  Slowly improving   -initial thought process was this was a hypovolemic hypernatremia  -CT Head: No acute intracranial abnormality  Encephalomalacia involving the right occipital lobe and bilateral cerebelli  Grossly stable appearance of periventricular hypoattenuation compared to MRI brain 2022, likely representing chronic microvascular ischemic changes  Plan  > as above in rhabdo section  VTE Pharmacologic Prophylaxis: VTE Score: 3 Moderate Risk (Score 3-4) - Pharmacological DVT Prophylaxis Ordered: warfarin (Coumadin)  Patient Centered Rounds: I performed bedside rounds with nursing staff today  Time Spent for Care: 30 minutes  More than 50% of total time spent on counseling and coordination of care as described above  Current Length of Stay: 4 day(s)  Current Patient Status: Inpatient   Certification Statement: The patient will continue to require additional inpatient hospital stay due to [address]  Discharge Plan: Anticipate discharge in >72 hrs to rehab facility  Code Status: Level 3 - DNAR and DNI    Subjective:   No overnight events  Patient today with flat affect  AAO x2  Slow and delayed with his answers  Complaining of ongoing back pain  Still with poor po intake   Per CM patient will need to be optioned   [address] discussion with family to be planned    Objective:     Vitals:   Temp (24hrs), Av °F (37 2 °C), Min:98 5 °F (36 9 °C), Max:99 3 °F (37 4 °C)    Temp:  [98 5 °F (36 9 °C)-99 3 °F (37 4 °C)] 98 5 °F (36 9 °C)  HR:  [] 89  Resp:  [14-25] 18  BP: ()/(71-89) 133/78  SpO2:  [96 %-99 %] 98 %  Body mass index is 28 56 kg/m²  Input and Output Summary (last 24 hours):      Intake/Output Summary (Last 24 hours) at 11/7/2022 1349  Last data filed at 11/7/2022 1202  Gross per 24 hour   Intake 1278 33 ml   Output 895 ml   Net 383 33 ml       Physical Exam:   Physical Exam  Vitals and nursing note reviewed  Constitutional:       Appearance: Normal appearance  He is well-developed  HENT:      Head: Normocephalic and atraumatic  Eyes:      Extraocular Movements: Extraocular movements intact  Conjunctiva/sclera: Conjunctivae normal       Pupils: Pupils are equal, round, and reactive to light  Cardiovascular:      Rate and Rhythm: Normal rate  Rhythm irregular  Heart sounds: No murmur heard  Pulmonary:      Effort: Pulmonary effort is normal  No respiratory distress  Breath sounds: Normal breath sounds  Abdominal:      Palpations: Abdomen is soft  Tenderness: There is no abdominal tenderness  Musculoskeletal:      Cervical back: Neck supple  Skin:     General: Skin is warm and dry  Neurological:      Mental Status: He is alert  He is disoriented  Motor: Tremor present  Comments: Left hand tremor   Psychiatric:         Mood and Affect: Affect is flat  Cognition and Memory: Cognition is impaired             Additional Data:     Labs:  Results from last 7 days   Lab Units 11/07/22  0458   WBC Thousand/uL 8 07   HEMOGLOBIN g/dL 11 8*   HEMATOCRIT % 34 9*   PLATELETS Thousands/uL 152   NEUTROS PCT % 79*   LYMPHS PCT % 11*   MONOS PCT % 7   EOS PCT % 2     Results from last 7 days   Lab Units 11/07/22  0458 11/04/22  0818 11/04/22  0000   SODIUM mmol/L 142   < > 153*   POTASSIUM mmol/L 3 5   < > 3 1*   CHLORIDE mmol/L 113*   < > 117*   CO2 mmol/L 25   < > 28   BUN mg/dL 18   < > 64*   CREATININE mg/dL 0 86   < > 1 88*   ANION GAP mmol/L 4   < > 8   CALCIUM mg/dL 8 3   < > 8 5   ALBUMIN g/dL  --   --  2 4*   TOTAL BILIRUBIN mg/dL  --   --  1 00   ALK PHOS U/L  --   --  73   ALT U/L  --   --  62   AST U/L  --   --  230*   GLUCOSE RANDOM mg/dL 115   < > 154*    < > = values in this interval not displayed  Results from last 7 days   Lab Units 22  0458   INR  2 25*     Results from last 7 days   Lab Units 22  1104 22  2047 22  1603 22  1037 22  0601 22  2048 22  1618 22  1033 22  0600 22  2037 22  1610 22  1109   POC GLUCOSE mg/dl 117 91 94 106 117 121 110 114 122 119 128 128         Results from last 7 days   Lab Units 22  0000 22  2300   LACTIC ACID mmol/L 1 5 2 4*   PROCALCITONIN ng/ml 0 24 0 29*       Lines/Drains:  Invasive Devices  Report    Peripheral Intravenous Line  Duration           Peripheral IV 22 Distal;Left;Ventral (anterior) Forearm 3 days          Drain  Duration           Urethral Catheter Latex 16 Fr  3 days              Urinary Catheter:  Goal for removal: Voiding trial when ambulation improves           Telemetry:  Telemetry Orders (From admission, onward)             24 Hour Telemetry Monitoring  Continuous x 24 Hours (Telem)           References:    Telemetry Guidelines   Question:  Reason for 24 Hour Telemetry  Answer:  Metabolic/Electrolyte Disturbance with High Probability of Dysrhythmia (K level <3 or >6, or KCL infusion >=10mEq/hr)                 Telemetry Reviewed: Atrial fibrillation  HR averaging 90  Indication for Continued Telemetry Use: No indication for continued use  Will discontinue  Imaging: Reviewed radiology reports from this admission including: procedure reports    Recent Cultures (last 7 days):   Results from last 7 days   Lab Units 22  2300   BLOOD CULTURE  No Growth at 72 hrs  No Growth at 72 hrs         Last 24 Hours Medication List:   Current Facility-Administered Medications   Medication Dose Route Frequency Provider Last Rate   • cefTRIAXone  2,000 mg Intravenous Q24H Andres Zapien DO Stopped (22 0600)   • dextrose 5% lactated ringer's  50 mL/hr Intravenous Continuous Yolis Macdonald DO 50 mL/hr (22 5470)   • folic acid 1 mg, thiamine 100 mg in 0 9% sodium chloride 100 mL IVPB   Intravenous Daily Sergio Almendarez DO 0 mL/hr at 11/06/22 1200   • insulin lispro  1-5 Units Subcutaneous TID AC Andres Zapien DO     • LORazepam  0 5 mg Intravenous Q6H PRN Andres Zapien DO     • pantoprazole  40 mg Oral BID Sergio Almendarez DO     • warfarin  2 5 mg Oral Once (warfarin) Vero Millard DO          Today, Patient Was Seen By: Wanda Joseph    **Please Note: This note may have been constructed using a voice recognition system  **

## 2022-11-07 NOTE — QUICK NOTE
Mr Lawerence Lesch is a 58year old male with underlying APLS syndrome (most recently on Coumadin 4mg daily for [facility]), recurrent CVA who presented for evaluation of worsening generalized weakness  Admitted for suptherapeutic INR and rhabdomyolysis  Pt's INR 2 25 this morning  Will start the patient on Coumadin 2 5mg daily (was on 4mg daily at home but patient continues to have poor nutrition and ordering a lower dose to avoid INR from becoming supratherapeutic again)  Future dosing to be adjusted based on daily INR and with close monitoring for any bleeding events

## 2022-11-07 NOTE — ASSESSMENT & PLAN NOTE
Outpatient follow-up with Neurology  No inpatient intervention as per Neurology  Will follow-up with Dr Caty Garcia as outpatient  Patient known to him for prior stroke

## 2022-11-07 NOTE — ASSESSMENT & PLAN NOTE
-history of antiphospholipid antibody on PTA Coumadin  -had ischemic CVA despite being on Eliquis in the past  -restarting warfarin as noted above

## 2022-11-07 NOTE — ASSESSMENT & PLAN NOTE
-on PTA carvedilol and warfarin  Plan  > continue carvedilol  Hold warfarin given supratherapeutic INR    Will target INR between 2 5 in 3 5 for hypercoagulable state  Per [facility] restart warfarin at 2 5 mg dose today

## 2022-11-07 NOTE — PLAN OF CARE
Problem: OCCUPATIONAL THERAPY ADULT  Goal: Performs self-care activities at highest level of function for planned discharge setting  See evaluation for individualized goals  Description: Treatment Interventions: ADL retraining, Functional transfer training, UE strengthening/ROM, Endurance training, Patient/family training, Cognitive reorientation, Equipment evaluation/education, Compensatory technique education, Continued evaluation, Energy conservation, Activityengagement          See flowsheet documentation for full assessment, interventions and recommendations  Note: Limitation: Decreased ADL status, Decreased endurance, Decreased cognition, Decreased self-care trans, Decreased high-level ADLs  Prognosis: Fair  Assessment: Pt is a 58 y o  male admitted to [facility] on 11/3/2022 w/ rhabdomyolosis  Pt  has a past medical history of Aneurysm of thoracic aorta, Anxiety, Arthritis, Bilateral inguinal hernia, Cardiac disease, Cognitive impairment, CVA (cerebral vascular accident) ([facility] ), Depression, GERD (gastroesophageal reflux disease), Hearing loss of aging, Heart disease, Hyperlipidemia, Hypertension, Irritable bowel syndrome, Kidney stone, Obesity, Occasional tremors, Palpitations, Panic attack, PONV (postoperative nausea and vomiting), Psychiatric illness, Sciatica, Shortness of breath, Sleep apnea, Sleep difficulties, Spitting up blood (05/21/2021), Status post placement of implantable loop recorder, Stroke ([facility] ) (11/2014), Stroke ([facility] ) (03/2021), and TIA (transient ischemic attack)  Pt with active OT orders and activity as tolerated orders  Pt is a poor historian, unclear home setup and available support  Per EMR, pt was I with ADLS and mobility PTA  Currently pt is mod A x2 for functional transfers and functional mobility, mod A for UB ADLS and max A for LB ADLS   Pt is limited at this time 2*: endurance, activity tolerance, functional mobility, forward functional reach, functional standing tolerance, decreased I w/ ADLS/IADLS, cognitive impairments, decreased safety awareness and decreased insight into deficits  The following Occupational Performance Areas to address include: grooming, bathing/shower, toilet hygiene, dressing, functional mobility and clothing management  Based on the aforementioned OT evaluation, functional performance deficits, and assessments, pt has been identified as a high complexity evaluation  From OT standpoint, anticipate d/c STR  Pt to continue to benefit from acute immediate OT services to address the following goals 2-3x/week to  w/in 10-14 days:       OT Discharge Recommendation: Post acute rehabilitation services

## 2022-11-07 NOTE — WOUND OSTOMY CARE
Consult Note - Wound   Cydney Fried 58 y o  male MRN: [id number]  Unit/Bed#: [facility] 408-01 Encounter: [id number]        History and Present Illness:  Patient is a 59 yo male that was admitted to the hospital for treatment of Rhamdomyolosis  Patient has a PMH of CVA x3, antiphospholipid syndrome on PTA warfarin, thoracic aortic aneurysm, cognitive impairment, severe anxiety  Patient is a max assist for turning and repositioning- patient did not participate with turning and repositioning in the chair  Patient stated that it took 4 people to stand the patient  Patient is incontinent of bowel and bladder managed via internal mcgregor catheter  On assessment, patient is sitting OOB on EHOB cushion in the chair with b/l feet elevated  Wound Care was consulted for Right Buttock Blister  Assessment Findings:   1  HA Left Buttocks Stage 2 Pressure Injury: Intact beefy red, non- blanchable erythema filled blister  Scant serosanguineous drainage  At least partial thickness skin loss underneath blister  Reena-wound intact and fragile  All other aspects of sacro-buttocks ashley  No induration, fluctuance, odor, warmth/temperature differences, or purulence noted to the above noted wounds and skin areas assessed  New dressings applied per orders listed below  Patient tolerated well- no s/s of non-verbal pain or discomfort observed during the encounter  Bedside nurse aware of plan of care  See flow sheets for more detailed assessment findings  Orders listed below and wound care will continue to follow, call or tiger text with questions  Skin Care Plan:  1-Cleanse sacro-buttocks with soap and water  Apply Calazime TID and PRN episodes of Incontinence  2-Turn/reposition q2h or when medically stable for pressure re-distribution on skin   3-Elevate heels to offload pressure  4-Moisturize skin daily with skin nourishing cream  5-Ehob cushion in chair when out of bed    6-Preventative Hydraguard to bilateral heels BID and PRN           Wounds:  Wound 11/06/22 Skin Tear Buttocks Left (Active)   Wound Image   11/07/22 1350   Wound Description Fragile;Pink 11/07/22 1350   Pressure Injury Stage 2 11/07/22 1350   Reena-wound Assessment Fragile 11/07/22 1350   Wound Length (cm) 1 cm 11/07/22 1350   Wound Width (cm) 1 cm 11/07/22 1350   Wound Depth (cm) 0 1 cm 11/07/22 1350   Wound Surface Area (cm^2) 1 cm^2 11/07/22 1350   Wound Volume (cm^3) 0 1 cm^3 11/07/22 1350   Calculated Wound Volume (cm^3) 0 1 cm^3 11/07/22 1350   Wound Site Closure REBECCA 11/07/22 1350   Drainage Amount Scant 11/07/22 1350   Drainage Description Serosanguineous 11/07/22 1350   Non-staged Wound Description Partial thickness 11/07/22 1350   Treatments Cleansed;Site care 11/07/22 1350   Dressing Protective barrier 11/07/22 1350   Dressing Changed New 11/07/22 1350   Patient Tolerance Tolerated well 11/07/22 1350   Dressing Status Intact 11/07/22 1350               Avi Ortiz RN, [employer]

## 2022-11-07 NOTE — ASSESSMENT & PLAN NOTE
-Hx CVA x 3 in setting of antiphospholipid syndrome  -on PTA warfarin although noted supratherapeutic INR  -CT head in ER: No acute intracranial abnormality  Encephalomalacia involving the right occipital lobe and bilateral cerebelli  Grossly stable appearance of periventricular hypoattenuation compared to MRI brain 8/29/2022, likely representing chronic microvascular ischemic changes    -on exam answers questions in a very low voice when asked, noted left upper extremity tremor that remains constant with movement, diffuse fatigue of bilateral lower extremities    PT/ ot consulted and recommending rehab

## 2022-11-07 NOTE — CASE MANAGEMENT
Case Management Discharge Planning Note    Patient name Jourdan Horn [facility] 302-90 MRN [id number]  : 1960 Date 2022       Current Admission Date: 11/3/2022  Current Admission Diagnosis:Rhabdomyolysis   Patient Active Problem List    Diagnosis Date Noted   • Rhabdomyolysis 2022   • Sepsis ([facility] ) 2022   • Hyponatremia 10/14/2022   • SIADH (syndrome of inappropriate ADH production) [facility] ) 10/14/2022   • Subclinical hyperthyroidism 10/14/2022   • Supratherapeutic INR 10/11/2022   • Sinus arrhythmia 10/10/2022   • Mild protein-calorie malnutrition ([facility] ) 10/07/2022   • Medical clearance for incarceration 2022   • 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 attack 2022   • Atypical chest pain 2022   • Hypokalemia 2022   • Dizziness 2022   • Renal cyst 2022   • PVD (peripheral vascular disease) ([facility] ) 2022   • Ataxia 2022   • CVA (cerebral vascular accident) ([facility] ) 2022   • Right inguinal pain 2022   • Alkaline phosphatase elevation 2022   • Low back pain 2022   • S/P laparoscopic hernia repair 2021   • Non-recurrent bilateral inguinal hernia without obstruction or gangrene 2021   • Gastric polyps 2021   • Gastric AVM 2021   • Edema of both lower legs 2021   • Tremor of right hand 2021   • Weakness of both lower extremities 2021   • Anticoagulated on Coumadin 2021   • CORIE (obstructive sleep apnea)    • Hyperbilirubinemia 08/10/2020   • Antiphospholipid antibody with hypercoagulable state ([facility] ) 2020   • History of stroke 2020   • Other viral warts 2019   • Physical deconditioning 2019   • Class 2 obesity in adult 2018   • Bright red blood per rectum 11/10/2017   • Numbness 2017   • H/O aortic aneurysm repair 2017   • Hypertension 2017   • GERD (gastroesophageal reflux disease) 2017   • Hyperlipidemia 03/17/2017   • Peyronie disease 12/09/2016   • Vitamin D deficiency 06/15/2016   • Atrial fibrillation ([address] ) 11/17/2014   • SHIMON (generalized anxiety disorder) 11/11/2014   • Constipation 09/19/2014   • Irritable bowel syndrome 04/24/2014   • Kidney stones 04/24/2014      LOS (days): 4  Geometric Mean LOS (GMLOS) (days): 5 00  Days to GMLOS:1 4     OBJECTIVE:  Risk of Unplanned Readmission Score: 29 39         Current admission status: Inpatient   Preferred Pharmacy:   [address] [place] [address] [address]  Phone: [phone] Fax: [phone]    Primary Care Provider: Jatin Gutierrez MD    Primary Insurance: [facility]  Secondary Insurance:     DISCHARGE DETAILS:                                          Other Referral/Resources/Interventions Provided:  Referral Comments: [facility] denied due to safety risk at home         Treatment Team Recommendation: Short Term Rehab  Discharge Destination Plan[de-identified]  (Plan changed to STR if agreeable)                                         Additional Comments: Pt needs to be Optioned  Recent psych discharge  Needs STR, 2 available [facility] and  [facility]   List given

## 2022-11-07 NOTE — OCCUPATIONAL THERAPY NOTE
Occupational Therapy Evaluation     Patient Name: Venice Easley  TCNHN'P Date: 11/7/2022  Problem List  Principal Problem:    Rhabdomyolysis  Active Problems:    SHIMON (generalized anxiety disorder)    Atrial fibrillation (HCC)    Antiphospholipid antibody with hypercoagulable state ([facility] [address] )    Tremor of right hand    CVA (cerebral vascular accident) ([facility] [address] )    Supratherapeutic INR    Sepsis ([facility] [address] )    Past Medical History  Past Medical History:   Diagnosis Date    Aneurysm of thoracic aorta     last assessed 11/20/17    Anxiety     currently on no meds    Arthritis     Bilateral inguinal hernia     Cardiac disease     Cognitive impairment     CVA (cerebral vascular accident) ([facility] [address] )     Depression     GERD (gastroesophageal reflux disease)     Hearing loss of aging     Heart disease     Hyperlipidemia     Hypertension     Irritable bowel syndrome     Kidney stone     Obesity     Occasional tremors     left arm since stroke    Palpitations     Panic attack     PONV (postoperative nausea and vomiting)     and headache x 3 days    Psychiatric illness     Sciatica     right and left  side    Shortness of breath     on exertion    Sleep apnea     is not using a CPAP    Sleep difficulties     Spitting up blood 05/21/2021    Resolved    Status post placement of implantable loop recorder     Stroke ([facility] ) 11/2014    Stroke ([facility] ) 03/2021    TIA (transient ischemic attack)      Past Surgical History  Past Surgical History:   Procedure Laterality Date    AORTA SURGERY      thoracic - aneurysmorrhaphy    APPENDECTOMY      ASCENDING AORTIC ANEURYSM REPAIR      resolved 8/19/15    CARDIAC LOOP RECORDER      CHOLECYSTECTOMY      laparoscopic    COLONOSCOPY      CYSTOSCOPY      with removal of object- stent removal    KNEE ARTHROSCOPY Right 1996    with medial meniscus repair    LITHOTRIPSY      renal    ORTHOPEDIC SURGERY      AK FRAGMENT KIDNEY STONE/ ESWL Left 5/17/2018    Procedure: LITHROTRIPSY EXTRACORPORAL SHOCKWAVE (ESWL); Surgeon: Flaquita Arroyo MD;  [facility];  Service: Urology    [place] Ame[address] Raym[address] Bilateral 12/9/2021    Procedure: ROBOTIC REPAIR HERNIA INGUINAL;  Surgeon: Marina Wheat MD;  Location: [facility];  Service: General    THUMB ARTHROSCOPY Right 1976    ligament repair    UPPER GASTROINTESTINAL ENDOSCOPY               11/07/22 0819   OT Last Visit   OT Visit Date 11/07/22   Note Type   Note type Evaluation   Pain Assessment   Pain Assessment Tool 0-10   Pain Score No Pain   Restrictions/Precautions   Other Precautions Cognitive; Chair Alarm; Bed Alarm;Multiple lines;Telemetry   Home Living   Additional Comments Pt is a poor historian, home setup will need to be clarified  Pt noted to have recent inpatient stay at [address] [place] [address]  Prior Function   Level of [place] Independent with ADLs   Lifestyle   Autonomy Per EMR, at baseline pt is I with ADLS and mobility  Reciprocal Relationships Unclear what support pt has   Service to Others Retired   [address] Enjoys "laying with blankets"   Subjective   Subjective "I'm confused"   ADL   Where Assessed Edge of bed   Eating Assistance 4  Minimal Assistance   Grooming Assistance 4  Minimal Assistance   UB Bathing Assistance 3  Moderate Assistance   LB Bathing Assistance 2  Maximal Assistance   [address] 3  Moderate Assistance   LB Dressing Assistance 2  Maximal [address]  2  Maximal Assistance   Bed Mobility   Supine to Sit 3  Moderate assistance   Additional items Assist x 1; Increased time required;Verbal cues;LE management   Additional Comments After OT session pt OOB in chair with alarm on and all needs within reach  Transfers   Sit to Stand 3  Moderate assistance   Additional items Assist x 2; Increased time required;Verbal cues   Stand to Sit 3  Moderate assistance   Additional items Assist x 2; Increased time required;Verbal cues   Functional Mobility   Functional Mobility 3  Moderate assistance   Additional Comments Pt took a few steps from bed to chair with mod A x2  Additional items Hand hold assistance   Balance   Static Sitting Fair -   Dynamic Sitting Poor +   Static Standing Poor   Dynamic Standing Poor   Ambulatory Poor   Activity Tolerance   Activity Tolerance Treatment limited secondary to medical complications (Comment); Patient limited by fatigue  (cognition)   Nurse Made Aware RN confirmed okay to see pt   Hand Function   Gross Motor Coordination   (Pt with significant tremors which he reports is baseline for him)   Cognition   Overall Cognitive Status Impaired   Arousal/Participation Alert;Arousable   Attention Attends with cues to redirect   Orientation Level Oriented to person   Memory Decreased recall of precautions   Following Commands Follows one step commands with increased time or repetition   Comments Pt presents with flat affect and requires significantly increased processing time  Assessment   Limitation Decreased ADL status; Decreased endurance;Decreased cognition;Decreased self-care trans;Decreased high-level ADLs   Prognosis Fair   Assessment Pt is a 58 y o  male admitted to [facility] on [facility]22 w/ rhabdomyolosis  Pt  has a past medical history of Aneurysm of thoracic aorta, Anxiety, Arthritis, Bilateral inguinal hernia, Cardiac disease, Cognitive impairment, CVA (cerebral vascular accident) ([facility] ), Depression, GERD (gastroesophageal reflux disease), Hearing loss of aging, Heart disease, Hyperlipidemia, Hypertension, Irritable bowel syndrome, Kidney stone, Obesity, Occasional tremors, Palpitations, Panic attack, PONV (postoperative nausea and vomiting), Psychiatric illness, Sciatica, Shortness of breath, Sleep apnea, Sleep difficulties, Spitting up blood (05/21/2021), Status post placement of implantable loop recorder, Stroke ([facility] [address] ) (11/2014), Stroke ([facility] ) (03/2021), and TIA (transient ischemic attack)  Pt with active OT orders and activity as tolerated orders   Pt is a poor historian, unclear home setup and available support  Per EMR, pt was I with ADLS and mobility PTA  Currently pt is mod A x2 for functional transfers and functional mobility, mod A for UB ADLS and max A for LB ADLS  Pt is limited at this time 2*: endurance, activity tolerance, functional mobility, forward functional reach, functional standing tolerance, decreased I w/ ADLS/IADLS, cognitive impairments, decreased safety awareness and decreased insight into deficits  The following Occupational Performance Areas to address include: grooming, bathing/shower, toilet hygiene, dressing, functional mobility and clothing management  Based on the aforementioned OT evaluation, functional performance deficits, and assessments, pt has been identified as a high complexity evaluation  From OT standpoint, anticipate d/c STR  Pt to continue to benefit from acute immediate OT services to address the following goals 2-3x/week to  w/in 10-14 days: Zora Barahona Goals   LTG Time Frame 10-   Long Term Goal #1 See goals below   Plan   Treatment Interventions ADL retraining;Functional transfer training;UE strengthening/ROM; Endurance training;Patient/family training;Cognitive reorientation;Equipment evaluation/education; Compensatory technique education;Continued evaluation; Energy conservation; Activityengagement   Goal Expiration Date 22   OT Frequency 2-3x/wk   Recommendation   OT Discharge Recommendation Post acute rehabilitation services   AM-PAC Daily Activity Inpatient   Lower Body Dressing 2   Bathing 2   Toileting 2   Upper Body Dressing 2   Grooming 3   Eating 3   Daily Activity Raw Score 14   Daily Activity Standardized Score (Calc for Raw Score >=11) 33 39   AM-PAC Applied Cognition Inpatient   Following a Speech/Presentation 1   Understanding Ordinary Conversation 2   Taking Medications 1   Remembering Where Things Are Placed or Put Away 2   Remembering List of 4-5 Errands 2   Taking Care of Complicated Tasks 1   Applied Cognition Raw Score 9   Applied Cognition Standardized Score 22 48   Modified Lexi Scale   Modified Quinn Scale 4       GOALS    1) Pt will increase activity tolerance to G for 30 min txment sessions    2) Pt will complete UB/LB dressing/self care w/ mod I using adaptive device and DME as needed    3) Pt will complete bathing w/ Mod I w/ use of AE and DME as needed    4) Pt will complete toileting w/ mod I w/ G hygiene/thoroughness using DME as needed    5) Pt will improve functional transfers to Mod I on/off all surfaces using DME as needed w/ G balance/safety     6) Pt will improve functional mobility during ADL/IADL/leisure tasks to Mod I using DME as needed w/ G balance/safety     7) Pt will demonstrate G carryover of pt/caregiver education and training as appropriate  8) Pt will demonstrate 100% carryover of energy conservation techniques t/o functional I/ADL/leisure tasks w/o cues s/p skilled education    9) Pt will independently identify and utilize 2-3 coping strategies to increase positive affect and promote overall well-being      10) Pt will engage in ongoing cognitive assessment w/ G participation to assist w/ safe d/c planning/recommendations    NANDO Hendrickson, OTR/L

## 2022-11-07 NOTE — ASSESSMENT & PLAN NOTE
-previous admission from the end of September until the end of October at [facility] in which he was hospitalized for over a month for severe anxiety and inability to care for self  -per sister reportedly was able to ambulate prior to the hospitalization but after the hospitalization patient needing assistance with ambulation at home  -during hospitalization there patient developed hyponatremia during his course of stay and Zoloft that had been started was stopped  -placed on mirtazapine as well as Abilify melatonin and Ativan on discharge    Restart Abilify

## 2022-11-07 NOTE — PHYSICAL THERAPY NOTE
PHYSICAL THERAPY NOTE          Patient Name: Sumeet Sagastume  THHWR'W Date: 11/7/2022 11/07/22 1111   PT Last Visit   PT Visit Date 11/07/22   Note Type   Note Type Treatment   Pain Assessment   Pain Assessment Tool 0-10   Pain Score No Pain   Restrictions/Precautions   Other Precautions Cognitive;Multiple lines;Telemetry; Chair Alarm; Fall Risk  (chair alarm on at the end of session)   General   Chart Reviewed Yes   Additional Pertinent History cleared for Tx session (spoke to jean)   Response to Previous Treatment Patient with no complaints from previous session  Cognition   Overall Cognitive Status Impaired   Arousal/Participation Alert; Cooperative   Attention Attends with cues to redirect   Orientation Level Oriented to person   Memory Decreased recall of recent events;Decreased recall of precautions   Following Commands Follows one step commands inconsistently   Subjective   Subjective Alert; in the chair; generally disoriented; agreeable to mobilize   Transfers   Sit to Stand 3  Moderate assistance   Additional items Assist x 2;Assist x 1; Increased time required;Verbal cues  (3 trials)   Stand to Sit 3  Moderate assistance   Additional items Assist x 1;Assist x 2;Verbal cues  (3 trials)   Ambulation/Elevation   Gait pattern Excessively slow; Short stride; Foward flexed; Inconsistent lucien;Knees flexed   Gait Assistance 3  Moderate assist   Additional items Assist x 2;Verbal cues; Tactile cues  (chair follow)   Assistive Device Rolling walker   Distance 2 x 4 ft w/ seated rest period in between   Balance   Static Sitting Fair -   Dynamic Sitting Poor +   Static Standing Poor   Dynamic Standing Poor -   Ambulatory Poor -   Activity Tolerance   Activity Tolerance Patient limited by fatigue   Nurse Made Aware spoke to HERON Bridges   Exercises   Knee AROM Long Arc Quad Sitting;15 reps;AROM; Bilateral   Ankle Pumps Sitting;15 reps;AROM; Bilateral   Marching Sitting;10 reps;AROM; Bilateral   Balance training  Standing balance/tolerance training x 2 min during pericare by staff   Assessment   Prognosis Guarded   Problem List Decreased strength;Decreased mobility; Impaired balance;Decreased endurance;Obesity; Decreased cognition; Impaired judgement;Decreased safety awareness   Assessment Pt cont to demonstrate overall weakness, deconditioning, decreased balance and associated mobility deficits w/ mod (A)x1-2 required for transfers and amb to assure safety; overall mobility status currently remains to be below premorbid level --> cont to recommend rehab upon D/C; will follow  Barriers to Discharge Inaccessible home environment   Goals   Patient Goals to walk   STG Expiration Date 11/16/22   PT Treatment Day 1   Plan   Treatment/Interventions Functional transfer training;LE strengthening/ROM; Therapeutic exercise; Endurance training;Cognitive reorientation; Bed mobility;Gait training;Spoke to nursing;Spoke to case management   Progress Progressing toward goals   PT Frequency 3-5x/wk   Recommendation   PT Discharge Recommendation Post acute rehabilitation services   Equipment Recommended [address] Mobility Inpatient   Turning in Bed Without Bedrails 2   Lying on Back to Sitting on Edge of Flat Bed 1   Moving Bed to Chair 1   Standing Up From Chair 2   Walk in Room 1   Climb 3-5 Stairs 1   Basic Mobility Inpatient Raw Score 8   Turning Head Towards Sound 3   Follow Simple Instructions 2   Low Function Basic Mobility Raw Score 13   Low Function Basic Mobility Standardized Score 20 14   Highest Level Of Mobility   -HLM Goal 3: Sit at edge of bed   JH-HLM Achieved 5: Stand (1 or more minutes)   Education   Education Provided Mobility training;Assistive device   Patient Reinforcement needed   End of Consult   Patient Position at End of Consult Bedside chair;Bed/Chair alarm activated; All needs within reach     [facility]

## 2022-11-08 PROBLEM — E43 SEVERE PROTEIN-CALORIE MALNUTRITION (HCC): Status: ACTIVE | Noted: 2022-01-01

## 2022-11-08 LAB
ANION GAP SERPL CALCULATED.3IONS-SCNC: 5 MMOL/L (ref 4–13)
APTT-LA 1H NP PPP: 45.8 SEC (ref 0–48.9)
APTT-LA IMM 4:1 NP PPP: 46.7 SEC (ref 0–48.9)
BASOPHILS # BLD AUTO: 0.02 THOUSANDS/ÂΜL (ref 0–0.1)
BASOPHILS NFR BLD AUTO: 0 % (ref 0–1)
BUN SERPL-MCNC: 19 MG/DL (ref 5–25)
CALCIUM SERPL-MCNC: 8.6 MG/DL (ref 8.3–10.1)
CHLORIDE SERPL-SCNC: 113 MMOL/L (ref 96–108)
CK MB SERPL-MCNC: 1.5 NG/ML (ref 0–5)
CK MB SERPL-MCNC: <1 % (ref 0–2.5)
CK SERPL-CCNC: 335 U/L (ref 39–308)
CO2 SERPL-SCNC: 26 MMOL/L (ref 21–32)
CREAT SERPL-MCNC: 0.95 MG/DL (ref 0.6–1.3)
DRVVT IMM 1:2 NP PPP: 46.1 SEC (ref 0–40.4)
DRVVT SCREEN TO CONFIRM RATIO: 1.1 RATIO (ref 0.8–1.2)
EOSINOPHIL # BLD AUTO: 0.14 THOUSAND/ÂΜL (ref 0–0.61)
EOSINOPHIL NFR BLD AUTO: 2 % (ref 0–6)
ERYTHROCYTE [DISTWIDTH] IN BLOOD BY AUTOMATED COUNT: 14.5 % (ref 11.6–15.1)
GFR SERPL CREATININE-BSD FRML MDRD: 85 ML/MIN/1.73SQ M
GLUCOSE SERPL-MCNC: 101 MG/DL (ref 65–140)
GLUCOSE SERPL-MCNC: 115 MG/DL (ref 65–140)
GLUCOSE SERPL-MCNC: 119 MG/DL (ref 65–140)
GLUCOSE SERPL-MCNC: 90 MG/DL (ref 65–140)
HCT VFR BLD AUTO: 35.7 % (ref 36.5–49.3)
HGB BLD-MCNC: 12.2 G/DL (ref 12–17)
IMM GRANULOCYTES # BLD AUTO: 0.04 THOUSAND/UL (ref 0–0.2)
IMM GRANULOCYTES NFR BLD AUTO: 1 % (ref 0–2)
INR PPP: 1.64 (ref 0.84–1.19)
LA PPP-IMP: ABNORMAL
LYMPHOCYTES # BLD AUTO: 1.02 THOUSANDS/ÂΜL (ref 0.6–4.47)
LYMPHOCYTES NFR BLD AUTO: 12 % (ref 14–44)
MCH RBC QN AUTO: 31 PG (ref 26.8–34.3)
MCHC RBC AUTO-ENTMCNC: 34.2 G/DL (ref 31.4–37.4)
MCV RBC AUTO: 91 FL (ref 82–98)
MONOCYTES # BLD AUTO: 0.63 THOUSAND/ÂΜL (ref 0.17–1.22)
MONOCYTES NFR BLD AUTO: 7 % (ref 4–12)
NEUTROPHILS # BLD AUTO: 6.72 THOUSANDS/ÂΜL (ref 1.85–7.62)
NEUTS SEG NFR BLD AUTO: 78 % (ref 43–75)
NRBC BLD AUTO-RTO: 0 /100 WBCS
PLATELET # BLD AUTO: 149 THOUSANDS/UL (ref 149–390)
PMV BLD AUTO: 12.1 FL (ref 8.9–12.7)
POTASSIUM SERPL-SCNC: 3.4 MMOL/L (ref 3.5–5.3)
PROTHROMBIN TIME: 19.7 SECONDS (ref 11.6–14.5)
RBC # BLD AUTO: 3.94 MILLION/UL (ref 3.88–5.62)
SCREEN APTT: 85.2 SEC (ref 0–51.9)
SCREEN DRVVT: 105.3 SEC (ref 0–47)
SODIUM SERPL-SCNC: 144 MMOL/L (ref 135–147)
WBC # BLD AUTO: 8.57 THOUSAND/UL (ref 4.31–10.16)

## 2022-11-08 RX ORDER — ENOXAPARIN SODIUM 100 MG/ML
1 INJECTION SUBCUTANEOUS EVERY 12 HOURS SCHEDULED
Status: DISCONTINUED | OUTPATIENT
Start: 2022-11-08 | End: 2022-12-04

## 2022-11-08 RX ORDER — SENNOSIDES 8.6 MG
2 TABLET ORAL DAILY
Status: DISCONTINUED | OUTPATIENT
Start: 2022-11-08 | End: 2022-11-11

## 2022-11-08 RX ORDER — WARFARIN SODIUM 2 MG/1
4 TABLET ORAL
Status: DISCONTINUED | OUTPATIENT
Start: 2022-11-08 | End: 2022-11-08

## 2022-11-08 RX ORDER — WARFARIN SODIUM 2.5 MG/1
2.5 TABLET ORAL
Status: COMPLETED | OUTPATIENT
Start: 2022-11-08 | End: 2022-11-08

## 2022-11-08 RX ORDER — POTASSIUM CHLORIDE 20 MEQ/1
20 TABLET, EXTENDED RELEASE ORAL 2 TIMES DAILY
Status: DISCONTINUED | OUTPATIENT
Start: 2022-11-08 | End: 2022-11-09

## 2022-11-08 RX ORDER — DOCUSATE SODIUM 100 MG/1
100 CAPSULE, LIQUID FILLED ORAL 2 TIMES DAILY
Status: DISCONTINUED | OUTPATIENT
Start: 2022-11-08 | End: 2022-11-11

## 2022-11-08 RX ADMIN — WARFARIN SODIUM 2.5 MG: 2.5 TABLET ORAL at 17:59

## 2022-11-08 RX ADMIN — ENOXAPARIN SODIUM 90 MG: 100 INJECTION SUBCUTANEOUS at 09:50

## 2022-11-08 RX ADMIN — ENOXAPARIN SODIUM 90 MG: 100 INJECTION SUBCUTANEOUS at 22:36

## 2022-11-08 RX ADMIN — DEXTROSE, SODIUM CHLORIDE, SODIUM LACTATE, POTASSIUM CHLORIDE, AND CALCIUM CHLORIDE 50 ML/HR: 5; .6; .31; .03; .02 INJECTION, SOLUTION INTRAVENOUS at 07:22

## 2022-11-08 RX ADMIN — THIAMINE HYDROCHLORIDE: 100 INJECTION, SOLUTION INTRAMUSCULAR; INTRAVENOUS at 08:56

## 2022-11-08 RX ADMIN — CEFTRIAXONE SODIUM 2000 MG: 10 INJECTION, POWDER, FOR SOLUTION INTRAVENOUS at 22:35

## 2022-11-08 RX ADMIN — POTASSIUM CHLORIDE 20 MEQ: 1500 TABLET, EXTENDED RELEASE ORAL at 09:50

## 2022-11-08 RX ADMIN — PANTOPRAZOLE SODIUM 40 MG: 40 TABLET, DELAYED RELEASE ORAL at 08:54

## 2022-11-08 NOTE — ASSESSMENT & PLAN NOTE
-Hx CVA x 3 in setting of antiphospholipid syndrome  -on PTA warfarin although noted supratherapeutic INR  -CT head in ER: No acute intracranial abnormality  Encephalomalacia involving the right occipital lobe and bilateral cerebelli  Grossly stable appearance of periventricular hypoattenuation compared to MRI brain 8/29/2022, likely representing chronic microvascular ischemic changes    PT/ ot consulted and recommending rehab

## 2022-11-08 NOTE — ASSESSMENT & PLAN NOTE
-WBC 14 K, heart rate 114,  lactic acid 2 3 on admission  -chest x-ray with no obvious focal infiltrate; daughter reports diffuse weakness and at times patient coughing after eating  Diet modified by speech therapy  -urine with foul odor-urinalysis grossly negative    Plan   Patient is afebrile, with stable procalcitonin, negative UA and negative x-ray     Could consider silent aspiration as potential source of infection; however, imaging appears negative currently  Continue CTX day 3 of 3

## 2022-11-08 NOTE — CASE MANAGEMENT
Case Management Discharge Planning Note    Patient name Joey Stevens  Location [facility] 722-06 MRN [id number]  : 1960 Date 2022       Current Admission Date: 11/3/2022  Current Admission Diagnosis:Rhabdomyolysis   Patient Active Problem List    Diagnosis Date Noted   • Severe protein-calorie malnutrition ([facility] [address] ) 2022   • Rhabdomyolysis 2022   • Sepsis ([facility] [address] ) 2022   • Hyponatremia 10/14/2022   • SIADH (syndrome of inappropriate ADH production) ([facility] ) 10/14/2022   • Subclinical hyperthyroidism 10/14/2022   • Supratherapeutic INR 10/11/2022   • Sinus arrhythmia 10/10/2022   • Mild protein-calorie malnutrition ([facility] ) 10/07/2022   • Medical clearance for incarceration 2022   • 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 attack 2022   • Atypical chest pain 2022   • Hypokalemia 2022   • Dizziness 2022   • Renal cyst 2022   • PVD (peripheral vascular disease) ([facility] ) 2022   • Ataxia 2022   • CVA (cerebral vascular accident) ([facility] ) 2022   • Right inguinal pain 2022   • Alkaline phosphatase elevation 2022   • Low back pain 2022   • S/P laparoscopic hernia repair 2021   • Non-recurrent bilateral inguinal hernia without obstruction or gangrene 2021   • Gastric polyps 2021   • Gastric AVM 2021   • Edema of both lower legs 2021   • Tremor of right hand 2021   • Weakness of both lower extremities 2021   • Anticoagulated on Coumadin 2021   • CORIE (obstructive sleep apnea)    • Hyperbilirubinemia 08/10/2020   • Antiphospholipid antibody with hypercoagulable state ([facility] [address] ) 2020   • History of stroke 2020   • Other viral warts 2019   • Physical deconditioning 2019   • Class 2 obesity in adult 2018   • Bright red blood per rectum 11/10/2017   • Numbness 2017   • H/O aortic aneurysm repair 2017   • Hypertension 2017   • GERD (gastroesophageal reflux disease) 08/26/2017   • Hyperlipidemia 03/17/2017   • Peyronie disease 12/09/2016   • Vitamin D deficiency 06/15/2016   • Atrial fibrillation ([address] ) 11/17/2014   • SHIMON (generalized anxiety disorder) 11/11/2014   • Constipation 09/19/2014   • Irritable bowel syndrome 04/24/2014   • Kidney stones 04/24/2014      LOS (days): 5  Geometric Mean LOS (GMLOS) (days): 5 00  Days to GMLOS:0 2     OBJECTIVE:  Risk of Unplanned Readmission Score: 25 96         Current admission status: Inpatient   Preferred Pharmacy:   [facility] [address] [facility], [address]  Phone: [phone] Fax: [phone]    Primary Care Provider: Whitney Marino MD    Primary Insurance: [address]  Secondary Insurance:     DISCHARGE DETAILS:                                                                                                 Additional Comments: PASSR completed and Optioning in process  To be sent to [facility]  2 available SNFs [facility]

## 2022-11-08 NOTE — PROGRESS NOTES
[address]  Progress Note - Talita Fischer 1960, 58 y o  male MRN: [id number]  Unit/Bed#: [facility] 408-01 Encounter: [id number]  Primary Care Provider: Suki Ceja MD   Date and time admitted to hospital: 11/3/2022  3:28 PM    * Rhabdomyolysis  Assessment & Plan  -CPK > 7000 on admission with DAYAN  -in setting of minimal ambulation and has been going up the stairs by crawling in using his buttocks    CPK significant improved today, will d/c IVF  DAYAN resolved     Sepsis ([facility] [address] )  Assessment & Plan  -WBC 14 K, heart rate 114,  lactic acid 2 3 on admission  -chest x-ray with no obvious focal infiltrate; daughter reports diffuse weakness and at times patient coughing after eating  Diet modified by speech therapy  -urine with foul odor-urinalysis grossly negative    Plan   Patient is afebrile, with stable procalcitonin, negative UA and negative x-ray  Could consider silent aspiration as potential source of infection; however, imaging appears negative currently  Continue CTX day 3 of 3    Antiphospholipid antibody with hypercoagulable state ([facility] [address] )  Assessment & Plan  -history of antiphospholipid antibody on PTA Coumadin  -had ischemic CVA despite being on Eliquis in the past  -restarting warfarin as noted above  -inr is subtherapeutic, will bridge with lovenox    Atrial fibrillation ([facility] [address] )  Assessment & Plan  -on PTA carvedilol and warfarin  Plan  > continue carvedilol  Hold warfarin given supratherapeutic INR  Will target INR between 2 5 in 3 5 for hypercoagulable state  Per hemeonc restart warfarin at 2 5 mg dose today    CVA (cerebral vascular accident) ([facility] [address] )  Assessment & Plan  -Hx CVA x 3 in setting of antiphospholipid syndrome  -on PTA warfarin although noted supratherapeutic INR  -CT head in ER: No acute intracranial abnormality  Encephalomalacia involving the right occipital lobe and bilateral cerebelli    Grossly stable appearance of periventricular hypoattenuation compared to MRI brain 8/29/2022, likely representing chronic microvascular ischemic changes  PT/ ot consulted and recommending rehab    SHIOMN (generalized anxiety disorder)  Assessment & Plan  -previous admission from the end of September until the end of October at [facility] in which he was hospitalized for over a month for severe anxiety and inability to care for self  -per sister reportedly was able to ambulate prior to the hospitalization but after the hospitalization patient needing assistance with ambulation at home  -during hospitalization there patient developed hyponatremia during his course of stay and Zoloft that had been started was stopped  -PRN lorazepam    Severe protein-calorie malnutrition ([facility] [address] )  Assessment & Plan  Malnutrition Findings:   Adult Malnutrition type: Chronic illness  Adult Degree of Malnutrition: Other severe protein calorie malnutrition  Malnutrition Characteristics: Inadequate energy, Weight loss                  360 Statement: Severe Pro/cathryn malnutrition r/t inadequate intake/anxiety as evidenced by 28% unplanned weight loss since 5/3/22, consuming < 75% energy intake compared to estimated enrgy needs > 1 month  Treatment TBD s/p swallow eval    BMI Findings: Body mass index is 28 83 kg/m²  Supratherapeutic INR  Assessment & Plan  Increased increased to 9 has history of antiphospholipid syndrome and AFib  -no evidence of bleeding  Plan  · > as INR mildly elevated and no source of bleeding, will hold off giving any reversal agents of supratherapeutic INR especially given patient's history of antiphospholipid syndrome and history of 3 previous CVA as well as having ischemic   · likely etiology of of elevated INR is multifactorial   Suspect poor nutrition in conjunction with vitamin K antagonism of Coumadin as etiology of elevated INR  Patient INR trending downward with holding of Coumadin    Given INR is less than 10 and history of hypercoagulable state in the setting of stroke history would prefer to monitor and trend off of Coumadin  continue warfarin at 2 5 mg daily , bridge with lovenox until therapeutic    Tremor of right hand  Assessment & Plan  · Outpatient follow-up with Neurology  No inpatient intervention as per Neurology  Will follow-up with Dr Philip Addison as outpatient  Patient known to him for prior stroke  VTE Pharmacologic Prophylaxis: VTE Score: 3 Moderate Risk (Score 3-4) - Pharmacological DVT Prophylaxis Ordered: heparin  Patient Centered Rounds: I performed bedside rounds with nursing staff today  Discussions with Specialists or Other Care Team Provider: psychiatry    Education and Discussions with Family / Patient: Updated  (sister) via phone  Time Spent for Care: 30 minutes  More than 50% of total time spent on counseling and coordination of care as described above  Current Length of Stay: 5 day(s)  Current Patient Status: Inpatient   Certification Statement: The patient will continue to require additional inpatient hospital stay due to monitor oral intake, dischage planning  Discharge Plan: Anticipate discharge in 24-48 hrs to rehab facility  Code Status: Level 3 - DNAR and DNI    Subjective:   Denies any acute complaints  States he is not eating because he doesn't like the food served here  Objective:     Vitals:   Temp (24hrs), Av 5 °F (36 9 °C), Min:98 °F (36 7 °C), Max:99 °F (37 2 °C)    Temp:  [98 °F (36 7 °C)-99 °F (37 2 °C)] 98 5 °F (36 9 °C)  HR:  [100-107] 107  Resp:  [17-20] 20  BP: (105-144)/(72-87) 118/77  SpO2:  [97 %-99 %] 99 %  Body mass index is 28 83 kg/m²  Input and Output Summary (last 24 hours): Intake/Output Summary (Last 24 hours) at 2022 1625  Last data filed at 2022 1240  Gross per 24 hour   Intake 1341 67 ml   Output 460 ml   Net 881 67 ml       Physical Exam:   Physical Exam  Constitutional:       Appearance: Normal appearance     HENT:      Head: Normocephalic and atraumatic  Nose: Nose normal       Mouth/Throat:      Mouth: Mucous membranes are moist       Pharynx: Oropharynx is clear  Eyes:      Extraocular Movements: Extraocular movements intact  Cardiovascular:      Rate and Rhythm: Normal rate and regular rhythm  Pulmonary:      Effort: Pulmonary effort is normal       Breath sounds: No wheezing or rales  Musculoskeletal:      Right lower leg: No edema  Left lower leg: No edema  Skin:     General: Skin is warm and dry  Neurological:      Mental Status: He is alert and oriented to person, place, and time  Comments: bizarre affect   Psychiatric:         Mood and Affect: Mood normal          Behavior: Behavior normal           Additional Data:     Labs:  Results from last 7 days   Lab Units 11/08/22  0438   WBC Thousand/uL 8 57   HEMOGLOBIN g/dL 12 2   HEMATOCRIT % 35 7*   PLATELETS Thousands/uL 149   NEUTROS PCT % 78*   LYMPHS PCT % 12*   MONOS PCT % 7   EOS PCT % 2     Results from last 7 days   Lab Units 11/08/22  0438 11/04/22  0818 11/04/22  0000   SODIUM mmol/L 144   < > 153*   POTASSIUM mmol/L 3 4*   < > 3 1*   CHLORIDE mmol/L 113*   < > 117*   CO2 mmol/L 26   < > 28   BUN mg/dL 19   < > 64*   CREATININE mg/dL 0 95   < > 1 88*   ANION GAP mmol/L 5   < > 8   CALCIUM mg/dL 8 6   < > 8 5   ALBUMIN g/dL  --   --  2 4*   TOTAL BILIRUBIN mg/dL  --   --  1 00   ALK PHOS U/L  --   --  73   ALT U/L  --   --  62   AST U/L  --   --  230*   GLUCOSE RANDOM mg/dL 115   < > 154*    < > = values in this interval not displayed       Results from last 7 days   Lab Units 11/08/22  0734   INR  1 64*     Results from last 7 days   Lab Units 11/08/22  1611 11/08/22  1041 11/08/22  0736 11/07/22  2104 11/07/22  1546 11/07/22  1104 11/06/22  2047 11/06/22  1603 11/06/22  1037 11/06/22  0601 11/05/22  2048 11/05/22  1618   POC GLUCOSE mg/dl 90 119 101 106 102 117 91 94 106 117 121 110         Results from last 7 days   Lab Units 11/04/22  0000 11/03/22  2300 LACTIC ACID mmol/L 1 5 2 4*   PROCALCITONIN ng/ml 0 24 0 29*       Lines/Drains:  Invasive Devices  Report    Peripheral Intravenous Line  Duration           Peripheral IV 11/08/22 Right Antecubital <1 day                      Imaging: No pertinent imaging reviewed  Recent Cultures (last 7 days):   Results from last 7 days   Lab Units 11/03/22  2300   BLOOD CULTURE  No Growth After 4 Days  No Growth After 4 Days  Last 24 Hours Medication List:   Current Facility-Administered Medications   Medication Dose Route Frequency Provider Last Rate   • cefTRIAXone  2,000 mg Intravenous Q24H Andres Zapien DO Stopped (11/07/22 2336)   • enoxaparin  1 mg/kg Subcutaneous Q12H [facility] Kee Riddle MD     • folic acid 1 mg, thiamine 100 mg in 0 9% sodium chloride 100 mL IVPB   Intravenous Daily Markel Door, DO 0 mL/hr at 11/06/22 1200   • insulin lispro  1-5 Units Subcutaneous TID AC Andres Zapien DO     • LORazepam  0 5 mg Intravenous Q6H PRN Andres Zapien DO     • pantoprazole  40 mg Oral BID Markel Door, DO     • potassium chloride  20 mEq Oral BID Kee Riddle MD     • warfarin  2 5 mg Oral Once (warfarin) Kee Riddle MD          Today, Patient Was Seen By: Chalo Jernigan    **Please Note: This note may have been constructed using a voice recognition system  **

## 2022-11-08 NOTE — ASSESSMENT & PLAN NOTE
· Outpatient follow-up with Neurology  No inpatient intervention as per Neurology  Will follow-up with Dr Claudia Mac as outpatient  Patient known to him for prior stroke

## 2022-11-08 NOTE — PHYSICAL THERAPY NOTE
PHYSICAL THERAPY NOTE          Patient Name: Mildred Carlson  HQVCR'A Date: 11/8/2022 11/08/22 0924   PT Last Visit   PT Visit Date 11/08/22   Note Type   Note Type Treatment   Pain Assessment   Pain Assessment Tool 0-10   Pain Score No Pain   Restrictions/Precautions   Other Precautions Chair Alarm;Cognitive;Multiple lines;Telemetry; Fall Risk  (chair alarm on at the end of session)   General   Chart Reviewed Yes   Additional Pertinent History cleared for Tx session (spoke to jean)   Response to Previous Treatment Patient with no complaints from previous session  Cognition   Overall Cognitive Status Impaired   Arousal/Participation Alert; Cooperative   Attention Attends with cues to redirect   Orientation Level Oriented to person;Oriented to situation   Memory Decreased recall of recent events;Decreased recall of precautions   Following Commands Follows one step commands with increased time or repetition   Subjective   Subjective Alert; in the chair; agreeable to mobilize   Transfers   Sit to Stand 3  Moderate assistance   Additional items Assist x 1;Assist x 2; Increased time required;Verbal cues  (3 trials)   Stand to Sit 3  Moderate assistance   Additional items Assist x 1;Assist x 2; Increased time required;Verbal cues  (3 trials)   Ambulation/Elevation   Gait pattern Short stride; Foward flexed; Shuffling;Excessively slow;Decreased foot clearance;Narrow TEE   Gait Assistance 3  Moderate assist   Additional items Assist x 1;Verbal cues; Tactile cues   Assistive Device Rolling walker   Distance 25 ft and 20 ft w/ extended seated rest period in between  (chair ride back to room)   Balance   Static Sitting Fair -   Dynamic Sitting Poor +   Static Standing Poor   Dynamic Standing Poor   Ambulatory Poor   Activity Tolerance   Activity Tolerance Patient limited by fatigue   Nurse Made Aware spoke to Opal Urbano RN   Exercises   Balance training  Standing balance/tolerance training x 2 min while receiving pericare/dressing change by nsg   Assessment   Prognosis Fair   Problem List Decreased strength;Decreased endurance; Impaired balance;Decreased mobility; Decreased cognition;Decreased safety awareness   Assessment Pt was able to progress w/ all aspects of mobility ambulating further distances w/ rw; pt still exhibits overall weakness, decreased balance, inconsistent motor control of LE and associated mobility deficits requiring mod (A)x1-2 for transfers and mod (A) for amb; based on above, cont to recommend rehab upon D/C when medically cleared; will follow   Barriers to Discharge Inaccessible home environment   Goals   Patient Goals to get better   STG Expiration Date 11/16/22   PT Treatment Day 2   Plan   Treatment/Interventions Functional transfer training;LE strengthening/ROM; Therapeutic exercise; Endurance training;Cognitive reorientation; Bed mobility;Gait training;Spoke to nursing   Progress Progressing toward goals   PT Frequency 3-5x/wk   Recommendation   PT Discharge Recommendation Post acute rehabilitation services   Equipment Recommended [address] Mobility Inpatient   Turning in Bed Without Bedrails 2   Lying on Back to Sitting on Edge of Flat Bed 2   Moving Bed to Chair 2   Standing Up From Chair 2   Walk in Room 2   Climb 3-5 Stairs 1   Basic Mobility Inpatient Raw Score 11   Basic Mobility Standardized Score 30 25   Highest Level Of Mobility   -HLM Goal 4: Move to chair/commode   JH-HLM Achieved 7: Walk 25 feet or more   Education   Education Provided Mobility training;Assistive device   Patient Demonstrates verbal understanding;Reinforcement needed   End of Consult   Patient Position at End of Consult Bedside chair;Bed/Chair alarm activated; All needs within reach       [facility]

## 2022-11-08 NOTE — ASSESSMENT & PLAN NOTE
-CPK > 7000 on admission with DAYAN  -in setting of minimal ambulation and has been going up the stairs by crawling in using his buttocks    CPK significant improved today, will d/c IVF  DAYAN resolved

## 2022-11-08 NOTE — ASSESSMENT & PLAN NOTE
Increased increased to 9 has history of antiphospholipid syndrome and AFib  -no evidence of bleeding  Plan  · > as INR mildly elevated and no source of bleeding, will hold off giving any reversal agents of supratherapeutic INR especially given patient's history of antiphospholipid syndrome and history of 3 previous CVA as well as having ischemic   · likely etiology of of elevated INR is multifactorial   Suspect poor nutrition in conjunction with vitamin K antagonism of Coumadin as etiology of elevated INR  Patient INR trending downward with holding of Coumadin  Given INR is less than 10 and history of hypercoagulable state in the setting of stroke history would prefer to monitor and trend off of Coumadin    continue warfarin at 2 5 mg daily , bridge with lovenox until therapeutic -Patient seen/examined on 12/11/18. Case/plan discussed with Dr. Vazquez as reviewed/edited by me above.  -Updated patient and her daughter at bedside.  -Discussed dietary modifications including avoiding excessive salt and fluid intake.   -Patient being treated outpatient for lower back pain with right LE radiculopathy symptoms and right hip pain and right knee pain. All chronic. Needs outpatient follow up. For now continue Tylenol and can use oxycodone PRN. Trial of colchicine for right knee pain ?gout, but likely chronic OA.   -LE edema much improved with lasix. Discussed importance of outpatient cardiology/PMD follow up.   -DC planning to rehab if accepted.

## 2022-11-08 NOTE — PLAN OF CARE
Problem: PHYSICAL THERAPY ADULT  Goal: Performs mobility at highest level of function for planned discharge setting  See evaluation for individualized goals  Description: Treatment/Interventions: LE strengthening/ROM, Functional transfer training, Endurance training, Patient/family training, Bed mobility, Cognitive reorientation, Therapeutic exercise          See flowsheet documentation for full assessment, interventions and recommendations  Outcome: Progressing  Note: Prognosis: Fair  Problem List: Decreased strength, Decreased endurance, Impaired balance, Decreased mobility, Decreased cognition, Decreased safety awareness  Assessment: Pt was able to progress w/ all aspects of mobility ambulating further distances w/ rw; pt still exhibits overall weakness, decreased balance, inconsistent motor control of LE and associated mobility deficits requiring mod (A)x1-2 for transfers and mod (A) for amb; based on above, cont to recommend rehab upon D/C when medically cleared; will follow  Barriers to Discharge: Inaccessible home environment     PT Discharge Recommendation: Post acute rehabilitation services    See flowsheet documentation for full assessment

## 2022-11-08 NOTE — CONSULTS
Consultation - [id number] Moses 58 y o  male MRN: [id number]  Unit/Bed#: [facility] 408-01 Encounter: [id number]      Chief Complaint:  I feels anxious    History of Present Illness   Physician Requesting Consult: Shukri Carson MD  Reason for Consult / Principal Problem:  Needs to be Optioned, Dx  Generalized anxiety disorder     Jose Robbins is a 58 y o  male with the  medical history CVA x 3, antiphospholipid syndrome thoracic aortic aneurysm, cognitive impairment and anxiety presents with worsening fatigue and decreased appetite  Has been evaluated for anxiety and option  Patient is very concrete with his answer, he focus in the IV line that he believed the need to be removed but after the nurse told him that it need to be there for 4 days, he calm down  He also states that he had boots, explain to him that this is to keep the flow of the blood in his leg  He states that he is anxious and have poor appetite  He denies any suicidal thoughts plans or intent he does not have any psychotic symptoms           Psychiatric Review Of Systems:  sleep: no  appetite changes: yes  weight changes: no  energy/anergy: no  interest/pleasure/anhedonia: no  somatic symptoms: no  anxiety/panic:  Anxious  jasson: no  guilty/hopeless: no  self injurious behavior/risky behavior: no    Historical Information   Past Psychiatric History:   He has a history anxiety disorder, depression , had a prior psych admission [address] [place] [address] on 2014 and [address] on September 2022  Currently in treatment with Dr Mariana Tena  Past Suicide attempts:  None  Past Violent behavior:  None  Past Psychiatric medication trial:  Lexapro, Atarax, Abilify, Ativan, Remeron, melatonin    Substance Abuse History:  Rarely  alcohol use in the past, never use drugs    I have assessed this patient for substance use within the past 12 months     History of IP/OP rehabilitation program:  None  Smoking history:  Never smoked  Family Psychiatric History:   His sister  has a history of depression and anxiety, no history of suicidal attempt in the family  And no history of substance abuse in the family    Social History  Education: some college  Learning Disabilities: none  Marital history:   Living arrangement, social support: he lives with his sister  Occupational History: on permanent disability  Functioning Relationships: good support system    Other Pertinent History: no legal or  history     Traumatic History:   Abuse: none  Other Traumatic Events: none    Past Medical History:   Diagnosis Date   • Aneurysm of thoracic aorta     last assessed 11/20/17   • Anxiety     currently on no meds   • Arthritis    • Bilateral inguinal hernia    • Cardiac disease    • Cognitive impairment    • CVA (cerebral vascular accident) ([place] [id number] )    • Depression    • GERD (gastroesophageal reflux disease)    • Hearing loss of aging    • Heart disease    • Hyperlipidemia    • Hypertension    • Irritable bowel syndrome    • Kidney stone    • Obesity    • Occasional tremors     left arm since stroke   • Palpitations    • Panic attack    • PONV (postoperative nausea and vomiting)     and headache x 3 days   • Psychiatric illness    • Sciatica     right and left  side   • Shortness of breath     on exertion   • Sleep apnea     is not using a CPAP   • Sleep difficulties    • Spitting up blood 05/21/2021    Resolved   • Status post placement of implantable loop recorder    • Stroke ([place] [id number] ) 11/2014   • Stroke ([place] [id number] ) 03/2021   • TIA (transient ischemic attack)        Medical Review Of Systems:  Review of Systems - Negative except anxiety, hand tremors in the left side, poor appetite, all other systems reviewed were negative    Meds/Allergies   current meds:   Current Facility-Administered Medications   Medication Dose Route Frequency   • ceftriaxone (ROCEPHIN) 2 g/50 mL in dextrose IVPB  2,000 mg Intravenous Q24H   • enoxaparin (LOVENOX) subcutaneous injection 90 mg  1 mg/kg Subcutaneous H72U DARLENE   • folic acid 1 mg, thiamine (VITAMIN B1) 100 mg in sodium chloride 0 9 % 100 mL IV piggyback   Intravenous Daily   • insulin lispro (HumaLOG) 100 units/mL subcutaneous injection 1-5 Units  1-5 Units Subcutaneous TID AC   • LORazepam (ATIVAN) injection 0 5 mg  0 5 mg Intravenous Q6H PRN   • pantoprazole (PROTONIX) EC tablet 40 mg  40 mg Oral BID   • potassium chloride (K-DUR,KLOR-CON) CR tablet 20 mEq  20 mEq Oral BID   • warfarin (COUMADIN) tablet 2 5 mg  2 5 mg Oral Once (warfarin)     Allergies   Allergen Reactions   • Losartan Angioedema   • Aspirin Fatigue     Due to blood thinners     • Bactrim [Sulfamethoxazole-Trimethoprim]    • Eliquis [Apixaban] Other (See Comments)     Failed  Had embolic CVA   • Lisinopril      Felt bad, was OK with Zestril brand name     • Tramadol        Objective   Vital signs in last 24 hours:  Temp:  [98 1 °F (36 7 °C)-99 °F (37 2 °C)] 98 8 °F (37 1 °C)  HR:  [] 100  Resp:  [17-18] 17  BP: (105-133)/(72-82) 132/76      Intake/Output Summary (Last 24 hours) at 11/8/2022 0841  Last data filed at 11/8/2022 0502  Gross per 24 hour   Intake 1201 67 ml   Output 725 ml   Net 476 67 ml       Mental Status Evaluation:  Appearance:  age appropriate   Behavior:  normal   Speech:  soft   Mood:  anxious   Affect:  constricted   Language: naming objects and repeating phrases   Thought Process:  concrete   Associations: concrete associations   Thought Content:  normal   Perceptual Disturbances: None   Risk Potential: Suicidal Ideations none, Homicidal Ideations none and Potential for Aggression No   Sensorium:  person, place and time/date   Memory:  patient does not answer   Cognition:  patient does not answer   Consciousness:  alert and awake    Attention: attention span appeared shorter than expected for age   Intellect: not examined   Fund of Knowledge: awareness of current events: Does not answer   Insight:  fair   Judgment: fair   Muscle Strength and Tone: Within normal limits   Gait/Station: Unable to assess patient is in bed   Motor Activity: Left hand tremor     Lab Results:    I have personally reviewed all pertinent laboratory/tests results  Labs in last 72 hours:   Recent Labs     11/08/22  0438   WBC 8 57   RBC 3 94   HGB 12 2   HCT 35 7*      RDW 14 5   NEUTROABS 6 72   SODIUM 144   K 3 4*   *   CO2 26   BUN 19   CREATININE 0 95   GLUC 115   CALCIUM 8 6       Code Status: )Level 3 - DNAR and DNI    Assessment/Plan     Assessment:  Luther Lamar is a 58 y o  male with medical history of CVA x3, antiphospholipid syndrome, thoracic aortic aneurysm, cognitive impairment, anxiety who presented to the hospital with worsening fatigue and decreased appetite  He has been evaluated for anxiety and option  He states that he is in the hospital he is fixated with the IV line  He denies suicidal thoughts plans or intent he does not have any psychotic symptoms, does not have any history manic episode  Diagnosis:  Generalized anxiety disorder  Plan:   Continue medical management  Continue lorazepam p r n  For anxiety  Patient is psychiatrically cleared to go to SNF  Discussed with primary team  No other intervention at this time  I will sign off  Risks, benefits and possible side effects of Medications:   Risks, benefits, and possible side effects of medications explained to patient and patient verbalizes understanding             Greer Salter

## 2022-11-08 NOTE — ASSESSMENT & PLAN NOTE
-history of antiphospholipid antibody on PTA Coumadin  -had ischemic CVA despite being on Eliquis in the past  -restarting warfarin as noted above  -inr is subtherapeutic, will bridge with lovenox

## 2022-11-08 NOTE — SPEECH THERAPY NOTE
Speech Language/Pathology    Speech/Language Pathology Progress Note    Patient Name: Jt Daily  WMYBP'V Date: 11/8/2022     Problem List  Principal Problem:    Rhabdomyolysis  Active Problems:    SHIMON (generalized anxiety disorder)    Atrial fibrillation (HCC)    Antiphospholipid antibody with hypercoagulable state ([facility] )    Tremor of right hand    CVA (cerebral vascular accident) ([facility] )    Supratherapeutic INR    Sepsis ([facility] )    Severe protein-calorie malnutrition ([facility] )       Past Medical History  Past Medical History:   Diagnosis Date   • Aneurysm of thoracic aorta     last assessed 11/20/17   • Anxiety     currently on no meds   • Arthritis    • Bilateral inguinal hernia    • Cardiac disease    • Cognitive impairment    • CVA (cerebral vascular accident) ([facility] )    • Depression    • GERD (gastroesophageal reflux disease)    • Hearing loss of aging    • Heart disease    • Hyperlipidemia    • Hypertension    • Irritable bowel syndrome    • Kidney stone    • Obesity    • Occasional tremors     left arm since stroke   • Palpitations    • Panic attack    • PONV (postoperative nausea and vomiting)     and headache x 3 days   • Psychiatric illness    • Sciatica     right and left  side   • Shortness of breath     on exertion   • Sleep apnea     is not using a CPAP   • Sleep difficulties    • Spitting up blood 05/21/2021    Resolved   • Status post placement of implantable loop recorder    • Stroke ([facility] ) 11/2014   • Stroke ([facility] ) 03/2021   • TIA (transient ischemic attack)         Past Surgical History  Past Surgical History:   Procedure Laterality Date   • AORTA SURGERY      thoracic - aneurysmorrhaphy   • APPENDECTOMY     • ASCENDING AORTIC ANEURYSM REPAIR      resolved 8/19/15   • CARDIAC LOOP RECORDER     • CHOLECYSTECTOMY      laparoscopic   • COLONOSCOPY     • CYSTOSCOPY      with removal of object- stent removal   • KNEE ARTHROSCOPY Right 1996    with medial meniscus repair   • LITHOTRIPSY      renal   • ORTHOPEDIC SURGERY     • PA FRAGMENT KIDNEY STONE/ ESWL Left 5/17/2018    Procedure: LITHROTRIPSY EXTRACORPORAL SHOCKWAVE (ESWL); Surgeon: Jose De Jesus Lares MD;  Location: [facility]  MAIN [facility];  Service: Urology   • PA Ameena Raymundo 19 Bilateral 12/9/2021    Procedure: ROBOTIC REPAIR HERNIA INGUINAL;  Surgeon: Rayna Saunders MD;  Location: [facility];  Service: General   • THUMB ARTHROSCOPY Right 1976    ligament repair   • UPPER GASTROINTESTINAL ENDOSCOPY           Subjective:  Pt seen for dysphagia tx  Pt sitting upright in chair, sleeping but awakened easily  Objective:  Reviewed chart, discussed with RN, who reports pt tolerating diet but reduced appetite  Breakfast tray was sitting in front of patient; he had not attempted to eat anything  Encouraged him to feed himself, but he stated that he couldn't  He was fed a few bites of yogurt with blueberries and one bite of of chopped peaches; he drank thin sips of water by straw  Bolus retrieval and draw from straw was adequate, mastication with peaches was prolonged  Bolus manipulation and transfer of yogurt and water appeared fairly prompt  Hyolaryngeal elevation observed and palpated, appears fairly prompt  There were no overt s/s of aspiration  Pt declined to eat any more, despite encouragement  Discussed with RN  Assessment:  Pt with prolonged but functional mastication of soft, moist solids  No s/s of aspiration  Limited PO observed today; pt with poor appetite  Pt is appropriate to continue current diet  Plan/Recommendations:  Continue current diet, dysphagia 2 and thin liquids  Assist with feeding  Consider trials of level 3 foods when pt is willing  ST to follow up for tx  Pt may benefit from supplements

## 2022-11-08 NOTE — ASSESSMENT & PLAN NOTE
-previous admission from the end of September until the end of October at [address] [place] [address] in which he was hospitalized for over a month for severe anxiety and inability to care for self  -per sister reportedly was able to ambulate prior to the hospitalization but after the hospitalization patient needing assistance with ambulation at home  -during hospitalization there patient developed hyponatremia during his course of stay and Zoloft that had been started was stopped  -PRN lorazepam

## 2022-11-08 NOTE — UTILIZATION REVIEW
Continued Stay Review    Date: 11/8/2022                        Current Patient Class: inpatient Current Level of Care: med/surg    HPI:62 y o  male with underlying APLS syndrome, recurrent CVA initially admitted on 11/3 with worsening generalized weakness and rhabdomyolysis, sepsis, supratherapeutic INR   Assessment/Plan:  this AM  Off IVFs  Continue IV Rocephin through todays dose  Speech eval with recommendation to continue dysphagia diet, ok for thin liquids  PT/OT with recommendation for IP rehab  Per CM pt needs to be Optioned  Psych consulted with no additional recommendation re meds  Continue lorazepam prn  Warfarin restarted  Continue to monitor INR daily  Monitor BMP daily  Continue supportive care      Vital Signs:   Date/Time Temp Pulse Resp BP MAP (mmHg) SpO2 O2 Device Patient Position - Orthostatic VS   11/08/22 1053 98 °F (36 7 °C) 101 19 144/87 109 99 % None (Room air) Sitting   11/08/22 0700 98 8 °F (37 1 °C) 100 17 132/76 83 97 % None (Room air) Sitting   11/08/22 0100 99 °F (37 2 °C) 106 Abnormal  -- 105/72 82 99 % None (Room air) Sitting   11/07/22 2251 98 3 °F (36 8 °C) 105 -- 109/72 82 99 % None (Room air) Sitting   11/07/22 1858 98 1 °F (36 7 °C) 103 -- 121/82 95 99 % None (Room air) Sitting   11/07/22 1600 -- -- -- -- -- 98 % None (Room air) --   11/07/22 1500 98 1 °F (36 7 °C) 95 -- 115/76 87 100 % None (Room air) Sitting   11/07/22 1200 -- -- -- -- -- 98 % None (Room air) --   11/07/22 1100 98 5 °F (36 9 °C) -- 18 133/78 103 99 % None (Room air) Sitting   11/07/22 0800 -- -- -- -- -- 98 % None (Room air) --   11/07/22 0507 -- 89 20 101/71 86 98 % -- --   11/07/22 0400 -- 94 25 Abnormal  99/71 85 97 % -- --   11/07/22 0300 99 3 °F (37 4 °C) -- 18 -- -- -- -- --   11/07/22 0200 -- 101 14 140/78 101 96 % -- --   11/06/22 2235 98 9 °F (37 2 °C) 99 18 137/89 110 98 % None (Room air) Lying   11/06/22 1953 99 3 °F (37 4 °C) 99 20 139/87 -- 98 % -- Lying   11/06/22 1545 98 8 °F (37 1 °C) 92 20 139/76 93 99 % None (Room air) Lying   11/06/22 1116 98 8 °F (37 1 °C) 93 19 142/80 99 99 % None (Room air) Lying   11/06/22 0742 98 9 °F (37 2 °C) 92 17 167/87 114 99 % None (Room air) Lying   11/06/22 0250 98 5 °F (36 9 °C) 81 17 96/63 73 99 % None (Room air) Lying     Pertinent Labs/Diagnostic Results:     Results from last 7 days   Lab Units 11/08/22  0438 11/07/22  0458 11/06/22  0456 11/05/22  0456 11/04/22  1457   WBC Thousand/uL 8 57 8 07 8 05 7 81 10 67*   HEMOGLOBIN g/dL 12 2 11 8* 11 9* 11 8* 12 9   HEMATOCRIT % 35 7* 34 9* 36 3* 35 2* 37 9   PLATELETS Thousands/uL 149 152 130* 144* 169   NEUTROS ABS Thousands/µL 6 72 6 39 6 18 6 21 8 90*         Results from last 7 days   Lab Units 11/08/22  0438 11/07/22  0458 11/06/22  0456 11/05/22  1456 11/05/22  0456 11/04/22  0818 11/04/22  0000 11/03/22  2300 11/03/22  1621   SODIUM mmol/L 144 142 141 144 145   < > 153*   < > 149*   POTASSIUM mmol/L 3 4* 3 5 3 1* 3 1* 3 1*   < > 3 1*   < > 2 8*   CHLORIDE mmol/L 113* 113* 109* 111* 114*   < > 117*   < > 109*   CO2 mmol/L 26 25 28 28 28   < > 28   < > 30   ANION GAP mmol/L 5 4 4 5 3*   < > 8   < > 10   BUN mg/dL 19 18 23 31* 41*   < > 64*   < > 77*   CREATININE mg/dL 0 95 0 86 0 96 1 12 1 27   < > 1 88*   < > 2 60*   EGFR ml/min/1 73sq m 85 92 84 70 60   < > 37   < > 25   CALCIUM mg/dL 8 6 8 3 8 2* 8 2* 8 6   < > 8 5   < > 10 2*   MAGNESIUM mg/dL  --   --   --   --   --   --  2 9*  --  3 0*   PHOSPHORUS mg/dL  --   --   --   --  2 2*  --   --   --   --     < > = values in this interval not displayed       Results from last 7 days   Lab Units 11/04/22  0000 11/03/22  1621   AST U/L 230* 333*   ALT U/L 62 85*   ALK PHOS U/L 73 119*   TOTAL PROTEIN g/dL 5 5* 8 5*   ALBUMIN g/dL 2 4* 3 7   TOTAL BILIRUBIN mg/dL 1 00 1 96*   AMMONIA umol/L  --  <10*     Results from last 7 days   Lab Units 11/08/22  1041 11/08/22  0736 11/07/22  2104 11/07/22  1546 11/07/22  1104 11/06/22  2047 11/06/22  1603 11/06/22  1037 11/06/22  0601 11/05/22  2048 11/05/22  1618 11/05/22  1033   POC GLUCOSE mg/dl 119 101 106 102 117 91 94 106 117 121 110 114     Results from last 7 days   Lab Units 11/08/22  0438 11/07/22  0458 11/06/22  0456 11/05/22  1456 11/05/22  0456 11/04/22  1457 11/04/22  0818 11/04/22  0000 11/03/22  2300 11/03/22  1621   GLUCOSE RANDOM mg/dL 115 115 131 144* 134 144* 177* 154* 151* 102     Results from last 7 days   Lab Units 11/04/22  0508   OSMOLALITY, SERUM mmol/*     Results from last 7 days   Lab Units 11/08/22  0438 11/06/22  0456 11/03/22  1621   CK TOTAL U/L 335* 1,193* 7,247*   CK MB INDEX % <1 0 <1 0 <1 0   CK MB ng/mL 1 5 2 1 35 3*     Results from last 7 days   Lab Units 11/03/22 2012 11/03/22  1816 11/03/22  1621   HS TNI 0HR ng/L  --   --  88*   HS TNI 2HR ng/L  --  70*  --    HSTNI D2 ng/L  --  -18  --    HS TNI 4HR ng/L 77*  --   --    HSTNI D4 ng/L -11  --   --        Results from last 7 days   Lab Units 11/08/22  0734 11/07/22  0458 11/06/22  0456 11/05/22  0456   PROTIME seconds 19 7* 25 1* 39 2* 59 1*   INR  1 64* 2 25* 4 00* 6 79*   PTT seconds  --  34 43* 54*     Results from last 7 days   Lab Units 11/04/22  0000   TSH 3RD GENERATON uIU/mL 0 282*     Results from last 7 days   Lab Units 11/04/22  0000 11/03/22  2300   PROCALCITONIN ng/ml 0 24 0 29*     Results from last 7 days   Lab Units 11/04/22  0000 11/03/22  2300   LACTIC ACID mmol/L 1 5 2 4*      Results from last 7 days   Lab Units 11/04/22  0508 11/04/22  0113   OSMOLALITY, SERUM mmol/*  --    OSMO UR mmol/KG  --  568     Results from last 7 days   Lab Units 11/04/22  0206 11/04/22 0113   CLARITY UA  Clear  --    COLOR UA  Light Yellow  --    SPEC GRAV UA  1 017  --    PH UA  5 0  --    GLUCOSE UA mg/dl Negative  --    KETONES UA mg/dl Negative  --    BLOOD UA  Moderate*  --    PROTEIN UA mg/dl Trace*  --    NITRITE UA  Negative  --    BILIRUBIN UA  Negative  --    UROBILINOGEN UA (BE) mg/dl <2 0  --    LEUKOCYTES UA Negative  --    WBC UA /hpf 2-4*  --    RBC UA /hpf 1-2  --    BACTERIA UA /hpf None Seen  --    EPITHELIAL CELLS WET PREP /hpf Occasional  --    MUCUS THREADS  Occasional*  --    SODIUM UR   --  <5   CREATININE UR mg/dL  --  144 0     Results from last 7 days   Lab Units 11/03/22  2300   BLOOD CULTURE  No Growth After 4 Days  No Growth After 4 Days  Medications:   Scheduled Medications:  cefTRIAXone, 2,000 mg, Intravenous, Q24H  enoxaparin, 1 mg/kg, Subcutaneous, L18X DARLENE  folic acid 1 mg, thiamine 100 mg in 0 9% sodium chloride 100 mL IVPB, , Intravenous, Daily  insulin lispro, 1-5 Units, Subcutaneous, TID AC  pantoprazole, 40 mg, Oral, BID  potassium chloride, 20 mEq, Oral, BID  warfarin, 2 5 mg, Oral, Once (warfarin)    PRN Meds:  LORazepam, 0 5 mg, Intravenous, Q6H PRN        Discharge Plan:D    Network Utilization Review Department  ATTENTION: Please call with any questions or concerns to [phone] and carefully listen to the prompts so that you are directed to the right person  All voicemails are confidential   [place] Doing all requests for admission clinical reviews, approved or denied determinations and any other requests to dedicated fax number below belonging to the campus where the patient is receiving treatment   List of dedicated fax numbers for the Facilities:  [address] DENIALS (Administrative/Medical Necessity) [phone]   [address] (Maternity/NICU/Pediatrics) [phone]   [address] [phone]   [place] [address] [phone]   [address] [place] [address] [address] [place] [place] [id number] [place] [address] [facility] [phone]   [address] [place] [phone] [address] [phone]

## 2022-11-08 NOTE — ASSESSMENT & PLAN NOTE
Malnutrition Findings:   Adult Malnutrition type: Chronic illness  Adult Degree of Malnutrition: Other severe protein calorie malnutrition  Malnutrition Characteristics: Inadequate energy, Weight loss                  360 Statement: Severe Pro/cathryn malnutrition r/t inadequate intake/anxiety as evidenced by 28% unplanned weight loss since 5/3/22, consuming < 75% energy intake compared to estimated enrgy needs > 1 month  Treatment TBD s/p swallow eval    BMI Findings: Body mass index is 28 83 kg/m²

## 2022-11-09 PROBLEM — R79.1 SUPRATHERAPEUTIC INR: Status: RESOLVED | Noted: 2022-01-01 | Resolved: 2022-01-01

## 2022-11-09 LAB
ANION GAP SERPL CALCULATED.3IONS-SCNC: 6 MMOL/L (ref 4–13)
BACTERIA BLD CULT: NORMAL
BACTERIA BLD CULT: NORMAL
BASOPHILS # BLD AUTO: 0.02 THOUSANDS/ÂΜL (ref 0–0.1)
BASOPHILS NFR BLD AUTO: 0 % (ref 0–1)
BUN SERPL-MCNC: 25 MG/DL (ref 5–25)
CALCIUM SERPL-MCNC: 8.7 MG/DL (ref 8.3–10.1)
CHLORIDE SERPL-SCNC: 114 MMOL/L (ref 96–108)
CO2 SERPL-SCNC: 27 MMOL/L (ref 21–32)
CREAT SERPL-MCNC: 1.07 MG/DL (ref 0.6–1.3)
EOSINOPHIL # BLD AUTO: 0.05 THOUSAND/ÂΜL (ref 0–0.61)
EOSINOPHIL NFR BLD AUTO: 1 % (ref 0–6)
ERYTHROCYTE [DISTWIDTH] IN BLOOD BY AUTOMATED COUNT: 14.5 % (ref 11.6–15.1)
GFR SERPL CREATININE-BSD FRML MDRD: 73 ML/MIN/1.73SQ M
GLUCOSE SERPL-MCNC: 100 MG/DL (ref 65–140)
GLUCOSE SERPL-MCNC: 100 MG/DL (ref 65–140)
GLUCOSE SERPL-MCNC: 83 MG/DL (ref 65–140)
GLUCOSE SERPL-MCNC: 95 MG/DL (ref 65–140)
HCT VFR BLD AUTO: 35.3 % (ref 36.5–49.3)
HGB BLD-MCNC: 11.8 G/DL (ref 12–17)
IMM GRANULOCYTES # BLD AUTO: 0.03 THOUSAND/UL (ref 0–0.2)
IMM GRANULOCYTES NFR BLD AUTO: 0 % (ref 0–2)
INR PPP: 1.7 (ref 0.84–1.19)
LYMPHOCYTES # BLD AUTO: 1.07 THOUSANDS/ÂΜL (ref 0.6–4.47)
LYMPHOCYTES NFR BLD AUTO: 11 % (ref 14–44)
MAGNESIUM SERPL-MCNC: 2.4 MG/DL (ref 1.6–2.6)
MCH RBC QN AUTO: 31.4 PG (ref 26.8–34.3)
MCHC RBC AUTO-ENTMCNC: 33.4 G/DL (ref 31.4–37.4)
MCV RBC AUTO: 94 FL (ref 82–98)
MONOCYTES # BLD AUTO: 0.6 THOUSAND/ÂΜL (ref 0.17–1.22)
MONOCYTES NFR BLD AUTO: 6 % (ref 4–12)
NEUTROPHILS # BLD AUTO: 7.87 THOUSANDS/ÂΜL (ref 1.85–7.62)
NEUTS SEG NFR BLD AUTO: 82 % (ref 43–75)
NRBC BLD AUTO-RTO: 0 /100 WBCS
PHOSPHATE SERPL-MCNC: 2.8 MG/DL (ref 2.3–4.1)
PLATELET # BLD AUTO: 154 THOUSANDS/UL (ref 149–390)
PMV BLD AUTO: 11.2 FL (ref 8.9–12.7)
POTASSIUM SERPL-SCNC: 3.2 MMOL/L (ref 3.5–5.3)
PROTHROMBIN TIME: 20.2 SECONDS (ref 11.6–14.5)
RBC # BLD AUTO: 3.76 MILLION/UL (ref 3.88–5.62)
SODIUM SERPL-SCNC: 147 MMOL/L (ref 135–147)
WBC # BLD AUTO: 9.64 THOUSAND/UL (ref 4.31–10.16)

## 2022-11-09 RX ORDER — LORAZEPAM 0.5 MG/1
0.5 TABLET ORAL EVERY 8 HOURS PRN
Status: DISCONTINUED | OUTPATIENT
Start: 2022-11-09 | End: 2022-11-21

## 2022-11-09 RX ORDER — POTASSIUM CHLORIDE 20 MEQ/1
20 TABLET, EXTENDED RELEASE ORAL
Status: DISCONTINUED | OUTPATIENT
Start: 2022-11-09 | End: 2022-11-14

## 2022-11-09 RX ORDER — BUSPIRONE HYDROCHLORIDE 5 MG/1
5 TABLET ORAL 2 TIMES DAILY
Status: DISCONTINUED | OUTPATIENT
Start: 2022-11-09 | End: 2022-12-09 | Stop reason: HOSPADM

## 2022-11-09 RX ORDER — LANOLIN ALCOHOL/MO/W.PET/CERES
100 CREAM (GRAM) TOPICAL DAILY
Status: DISCONTINUED | OUTPATIENT
Start: 2022-11-09 | End: 2022-12-09 | Stop reason: HOSPADM

## 2022-11-09 RX ORDER — FOLIC ACID 1 MG/1
1 TABLET ORAL DAILY
Status: DISCONTINUED | OUTPATIENT
Start: 2022-11-09 | End: 2022-12-09 | Stop reason: HOSPADM

## 2022-11-09 RX ORDER — WARFARIN SODIUM 3 MG/1
3 TABLET ORAL
Status: COMPLETED | OUTPATIENT
Start: 2022-11-09 | End: 2022-11-09

## 2022-11-09 RX ADMIN — BUSPIRONE HYDROCHLORIDE 5 MG: 5 TABLET ORAL at 19:17

## 2022-11-09 RX ADMIN — POTASSIUM CHLORIDE 20 MEQ: 1500 TABLET, EXTENDED RELEASE ORAL at 11:26

## 2022-11-09 RX ADMIN — WARFARIN SODIUM 3 MG: 3 TABLET ORAL at 19:11

## 2022-11-09 RX ADMIN — POTASSIUM CHLORIDE 20 MEQ: 1500 TABLET, EXTENDED RELEASE ORAL at 16:25

## 2022-11-09 RX ADMIN — B-COMPLEX W/ C & FOLIC ACID TAB 1 TABLET: TAB at 11:27

## 2022-11-09 RX ADMIN — THIAMINE HYDROCHLORIDE: 100 INJECTION, SOLUTION INTRAMUSCULAR; INTRAVENOUS at 08:47

## 2022-11-09 RX ADMIN — ENOXAPARIN SODIUM 90 MG: 100 INJECTION SUBCUTANEOUS at 08:29

## 2022-11-09 RX ADMIN — POTASSIUM CHLORIDE 20 MEQ: 1500 TABLET, EXTENDED RELEASE ORAL at 08:29

## 2022-11-09 RX ADMIN — PANTOPRAZOLE SODIUM 40 MG: 40 TABLET, DELAYED RELEASE ORAL at 19:11

## 2022-11-09 RX ADMIN — DOCUSATE SODIUM 100 MG: 100 CAPSULE, LIQUID FILLED ORAL at 11:28

## 2022-11-09 RX ADMIN — SENNOSIDES 17.2 MG: 8.6 TABLET, FILM COATED ORAL at 08:29

## 2022-11-09 RX ADMIN — BUSPIRONE HYDROCHLORIDE 5 MG: 5 TABLET ORAL at 13:39

## 2022-11-09 RX ADMIN — ENOXAPARIN SODIUM 90 MG: 100 INJECTION SUBCUTANEOUS at 22:32

## 2022-11-09 RX ADMIN — PANTOPRAZOLE SODIUM 40 MG: 40 TABLET, DELAYED RELEASE ORAL at 08:29

## 2022-11-09 NOTE — ASSESSMENT & PLAN NOTE
-on PTA carvedilol and warfarin  Plan  > continue carvedilol  Hold warfarin given supratherapeutic INR    Will target INR between 2 5 in 3 5 for hypercoagulable state  Continue lovenox to warfarin bridge

## 2022-11-09 NOTE — CASE MANAGEMENT
Case Management Discharge Planning Note    Patient name Arlene Curran  Location [facility] 934-50 MRN [id number]  : 1960 Date 2022       Current Admission Date: 11/3/2022  Current Admission Diagnosis:Rhabdomyolysis   Patient Active Problem List    Diagnosis Date Noted   • Severe protein-calorie malnutrition ([facility] ) 2022   • Rhabdomyolysis 2022   • Sepsis ([facility] ) 2022   • Hyponatremia 10/14/2022   • SIADH (syndrome of inappropriate ADH production) ([facility] ) 10/14/2022   • Subclinical hyperthyroidism 10/14/2022   • Supratherapeutic INR 10/11/2022   • Sinus arrhythmia 10/10/2022   • Mild protein-calorie malnutrition ([place] [address] ) 10/07/2022   • Medical clearance for incarceration 2022   • 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 attack 2022   • Atypical chest pain 2022   • Hypokalemia 2022   • Dizziness 2022   • Renal cyst 2022   • PVD (peripheral vascular disease) ([facility] ) 2022   • Ataxia 2022   • CVA (cerebral vascular accident) ([place] [address] ) 2022   • Right inguinal pain 2022   • Alkaline phosphatase elevation 2022   • Low back pain 2022   • S/P laparoscopic hernia repair 2021   • Non-recurrent bilateral inguinal hernia without obstruction or gangrene 2021   • Gastric polyps 2021   • Gastric AVM 2021   • Edema of both lower legs 2021   • Tremor of right hand 2021   • Weakness of both lower extremities 2021   • Anticoagulated on Coumadin 2021   • CORIE (obstructive sleep apnea)    • Hyperbilirubinemia 08/10/2020   • Antiphospholipid antibody with hypercoagulable state ([facility] ) 2020   • History of stroke 2020   • Other viral warts 2019   • Physical deconditioning 2019   • Class 2 obesity in adult 2018   • Bright red blood per rectum 11/10/2017   • Numbness 2017   • H/O aortic aneurysm repair 2017   • Hypertension 2017   • GERD (gastroesophageal reflux disease) 08/26/2017   • Hyperlipidemia 03/17/2017   • Peyronie disease 12/09/2016   • Vitamin D deficiency 06/15/2016   • Atrial fibrillation ([address] ) 11/17/2014   • SHIMON (generalized anxiety disorder) 11/11/2014   • Constipation 09/19/2014   • Irritable bowel syndrome 04/24/2014   • Kidney stones 04/24/2014      LOS (days): 6  Geometric Mean LOS (GMLOS) (days): 5 00  Days to GMLOS:-0 6     OBJECTIVE:  Risk of Unplanned Readmission Score: 31 73         Current admission status: Inpatient   Preferred Pharmacy:   [facility] [address] [facility], [address]  Phone: [phone] Fax: [phone]    Primary Care Provider: Galilea Shelton MD    Primary Insurance: [address] [place], [place] [address]     DISCHARGE DETAILS:                          Additional Comments: Option completed and will be forwarded to Irma when signed by doctor

## 2022-11-09 NOTE — PROGRESS NOTES
[address] [facility]  Progress Note - [place] Gravely 1960, 58 y o  male MRN: [id number]  Unit/Bed#: [facility] 408-01 Encounter: [id number]  Primary Care Provider: Annmarie Baez MD   Date and time admitted to hospital: 11/3/2022  3:28 PM    * Rhabdomyolysis  Assessment & Plan  -CPK > 7000 on admission with DAYAN  -in setting of minimal ambulation and has been going up the stairs by crawling in using his buttocks    CPK significant improved   OFF IVF  DAYAN resolved     Sepsis ([facility] [address] )  Assessment & Plan  -WBC 14 K, heart rate 114,  lactic acid 2 3 on admission  -chest x-ray with no obvious focal infiltrate; daughter reports diffuse weakness and at times patient coughing after eating  Diet modified by speech therapy  -urine with foul odor-urinalysis grossly negative    Plan   Patient is afebrile, with stable procalcitonin, negative UA and negative x-ray  Could consider silent aspiration as potential source of infection; however, imaging appears negative currently  S/p 3 days of ceftriaxone    Antiphospholipid antibody with hypercoagulable state ([facility] [address] )  Assessment & Plan  -history of antiphospholipid antibody on PTA Coumadin  -had ischemic CVA despite being on Eliquis in the past  -restarting warfarin as noted above  -inr is subtherapeutic, will continue bridge with lovenox    Atrial fibrillation ([facility] [address] )  Assessment & Plan  -on PTA carvedilol and warfarin  Plan  > continue carvedilol  Hold warfarin given supratherapeutic INR  Will target INR between 2 5 in 3 5 for hypercoagulable state  Continue lovenox to warfarin bridge    CVA (cerebral vascular accident) ([facility] [address] )  Assessment & Plan  -Hx CVA x 3 in setting of antiphospholipid syndrome  -on PTA warfarin although noted supratherapeutic INR  -CT head in ER: No acute intracranial abnormality  Encephalomalacia involving the right occipital lobe and bilateral cerebelli    Grossly stable appearance of periventricular hypoattenuation compared to MRI brain 8/29/2022, likely representing chronic microvascular ischemic changes  PT/ ot consulted and recommending rehab    SHIMON (generalized anxiety disorder)  Assessment & Plan  -previous admission from the end of September until the end of October at [facility] in which he was hospitalized for over a month for severe anxiety and inability to care for self  -per sister reportedly was able to ambulate prior to the hospitalization but after the hospitalization patient needing assistance with ambulation at home  -during hospitalization there patient developed hyponatremia during his course of stay and Zoloft that had been started was stopped  -PRN lorazepam  -recently discharged from inpatient psychiatry with mirtazapine and aripiprazole, discussed resumption with the patient but he reports excess sedation with these  -d/w psychiatry today, will add buspar 5mg BID, monitor for response    Severe protein-calorie malnutrition ([address] )  Assessment & Plan  Malnutrition Findings:   Adult Malnutrition type: Chronic illness  Adult Degree of Malnutrition: Other severe protein calorie malnutrition  Malnutrition Characteristics: Inadequate energy, Weight loss                  360 Statement: Severe Pro/cathryn malnutrition r/t inadequate intake/anxiety as evidenced by 28% unplanned weight loss since 5/3/22, consuming < 75% energy intake compared to estimated enrgy needs > 1 month  Treatment TBD s/p swallow eval    BMI Findings: Body mass index is 29 4 kg/m²  Tremor of right hand  Assessment & Plan  · Outpatient follow-up with Neurology  No inpatient intervention as per Neurology  Will follow-up with Dr Marie Miranda as outpatient  Patient known to him for prior stroke  VTE Pharmacologic Prophylaxis: VTE Score: 3 Moderate Risk (Score 3-4) - Pharmacological DVT Prophylaxis Ordered: enoxaparin (Lovenox)  Patient Centered Rounds: I performed bedside rounds with nursing staff today    Discussions with Specialists or Other Care Team Provider: nurse, CM, psychiatry    Education and Discussions with Family / Patient: sister updated on   Time Spent for Care: 20 minutes  More than 50% of total time spent on counseling and coordination of care as described above  Current Length of Stay: 6 day(s)  Current Patient Status: Inpatient   Certification Statement: The patient will continue to require additional inpatient hospital stay due to monitor oral intake, discharge planning  Discharge Plan: Anticipate discharge in 24-48 hrs to rehab facility  Code Status: Level 3 - DNAR and DNI    Subjective:   Reports poor appetite and abdominal bloating  No BM today  Feels as if his limbs are heavy  Depressed  Objective:     Vitals:   Temp (24hrs), Av 7 °F (37 1 °C), Min:98 3 °F (36 8 °C), Max:99 °F (37 2 °C)    Temp:  [98 3 °F (36 8 °C)-99 °F (37 2 °C)] 98 8 °F (37 1 °C)  HR:  [] 98  Resp:  [18] 18  BP: (119-138)/(75-81) 129/77  SpO2:  [95 %-97 %] 96 %  Body mass index is 29 4 kg/m²  Input and Output Summary (last 24 hours): Intake/Output Summary (Last 24 hours) at 2022 1511  Last data filed at 2022 1734  Gross per 24 hour   Intake 50 ml   Output --   Net 50 ml       Physical Exam:   Physical Exam  Constitutional:       Appearance: Normal appearance  HENT:      Head: Normocephalic and atraumatic  Nose: Nose normal    Eyes:      Extraocular Movements: Extraocular movements intact  Pupils: Pupils are equal, round, and reactive to light  Cardiovascular:      Rate and Rhythm: Normal rate and regular rhythm  Pulmonary:      Effort: Pulmonary effort is normal    Abdominal:      General: There is no distension  Palpations: Abdomen is soft  Tenderness: There is no abdominal tenderness  Skin:     General: Skin is warm and dry  Neurological:      Mental Status: He is alert and oriented to person, place, and time  Motor: Weakness present     Psychiatric:         Mood and Affect: Mood normal          Behavior: Behavior normal           Additional Data:     Labs:  Results from last 7 days   Lab Units 11/09/22  0544   WBC Thousand/uL 9 64   HEMOGLOBIN g/dL 11 8*   HEMATOCRIT % 35 3*   PLATELETS Thousands/uL 154   NEUTROS PCT % 82*   LYMPHS PCT % 11*   MONOS PCT % 6   EOS PCT % 1     Results from last 7 days   Lab Units 11/09/22  0544 11/04/22  0818 11/04/22  0000   SODIUM mmol/L 147   < > 153*   POTASSIUM mmol/L 3 2*   < > 3 1*   CHLORIDE mmol/L 114*   < > 117*   CO2 mmol/L 27   < > 28   BUN mg/dL 25   < > 64*   CREATININE mg/dL 1 07   < > 1 88*   ANION GAP mmol/L 6   < > 8   CALCIUM mg/dL 8 7   < > 8 5   ALBUMIN g/dL  --   --  2 4*   TOTAL BILIRUBIN mg/dL  --   --  1 00   ALK PHOS U/L  --   --  73   ALT U/L  --   --  62   AST U/L  --   --  230*   GLUCOSE RANDOM mg/dL 100   < > 154*    < > = values in this interval not displayed  Results from last 7 days   Lab Units 11/09/22  0544   INR  1 70*     Results from last 7 days   Lab Units 11/09/22  1111 11/09/22  0621 11/08/22  1611 11/08/22  1041 11/08/22  0736 11/07/22  2104 11/07/22  1546 11/07/22  1104 11/06/22  2047 11/06/22  1603 11/06/22  1037 11/06/22  0601   POC GLUCOSE mg/dl 95 83 90 119 101 106 102 117 91 94 106 117         Results from last 7 days   Lab Units 11/04/22  0000 11/03/22  2300   LACTIC ACID mmol/L 1 5 2 4*   PROCALCITONIN ng/ml 0 24 0 29*       Lines/Drains:  Invasive Devices  Report    Peripheral Intravenous Line  Duration           Peripheral IV 11/08/22 Right Antecubital 1 day                      Imaging: No pertinent imaging reviewed  Recent Cultures (last 7 days):   Results from last 7 days   Lab Units 11/03/22  2300   BLOOD CULTURE  No Growth After 5 Days  No Growth After 5 Days         Last 24 Hours Medication List:   Current Facility-Administered Medications   Medication Dose Route Frequency Provider Last Rate   • busPIRone  5 mg Oral BID Morro Dunne MD     • docusate sodium  100 mg Oral BID Carly Lynn Anali Melton MD     • enoxaparin  1 mg/kg Subcutaneous Q12H Kingston Villafana MD     • folic acid  1 mg Oral Daily Hernán Lara MD     • LORazepam  0 5 mg Oral Q8H PRN Hernán Lara MD     • multivitamin stress formula  1 tablet Oral Daily Hernán Lara MD     • pantoprazole  40 mg Oral BID Marycarmenmaddie Mendoza DO     • potassium chloride  20 mEq Oral TID With Meals Hernán Lara MD     • senna  2 tablet Oral Daily Hernán Lara MD     • thiamine  100 mg Oral Daily Hernán Lara MD     • warfarin  3 mg Oral Once (warfarin) Heather Wan DO          Today, Patient Was Seen By: Sherice Fiore    **Please Note: This note may have been constructed using a voice recognition system  **

## 2022-11-09 NOTE — ASSESSMENT & PLAN NOTE
Malnutrition Findings:   Adult Malnutrition type: Chronic illness  Adult Degree of Malnutrition: Other severe protein calorie malnutrition  Malnutrition Characteristics: Inadequate energy, Weight loss                  360 Statement: Severe Pro/cathryn malnutrition r/t inadequate intake/anxiety as evidenced by 28% unplanned weight loss since 5/3/22, consuming < 75% energy intake compared to estimated enrgy needs > 1 month  Treatment TBD s/p swallow eval    BMI Findings: Body mass index is 29 4 kg/m²

## 2022-11-09 NOTE — OCCUPATIONAL THERAPY NOTE
Occupational Therapy Progress Note     Patient Name: Raissa Lucero  CXZFX'[id number] Date: 11/9/2022  Problem List  Principal Problem:    Rhabdomyolysis  Active Problems:    SHIMON (generalized anxiety disorder)    Atrial fibrillation (HCC)    Antiphospholipid antibody with hypercoagulable state ([facility] [address] )    Tremor of right hand    CVA (cerebral vascular accident) ([facility] [address] )    Supratherapeutic INR    Sepsis ([facility] [address] )    Severe protein-calorie malnutrition ([address] )          11/09/22 0956   OT Last Visit   OT Visit Date 11/09/22   Note Type   Note Type Treatment   Pain Assessment   Pain Assessment Tool [facility] Pain Intervention(s) Repositioned; Ambulation/increased activity; Emotional support   Pain Rating: FLACC (Rest) - Face 0   Pain Rating: FLACC (Rest) - Legs 0   Pain Rating: FLACC (Rest) - Activity 0   Pain Rating: FLACC (Rest) - Cry 0   Pain Rating: FLACC (Rest) - Consolability 0   Score: FLACC (Rest) 0   Pain Rating: FLACC (Activity) - Face 1   Pain Rating: FLACC (Activity) - Legs 0   Pain Rating: FLACC (Activity) - Activity 0   Pain Rating: FLACC (Activity) - Cry 1   Pain Rating: FLACC (Activity) - Consolability 1   Score: FLACC (Activity) 3   Restrictions/Precautions   Other Precautions Chair Alarm;Cognitive;Multiple lines;Telemetry; Bed Alarm   Lifestyle   Autonomy Per EMR, at baseline pt is I with ADLS and mobility  Reciprocal Relationships Unclear what support pt has   Service to Others Retired   [address] Enjoys "laying with blankets"   ADL   Where Assessed Edge of bed   LB Dressing Assistance 2  Maximal Assistance   LB Dressing Deficit Setup;Supervision/safety   Toileting Assistance  2  Maximal Assistance   Toileting Deficit Setup;Supervison/safety   Bed Mobility   Supine to Sit 3  Moderate assistance   Additional items Assist x 1; Increased time required;Verbal cues;LE management   Additional Comments After OT session pt OOB in chair with alarm on and all needs within reach     Transfers   Sit to Stand 3 Moderate assistance   Additional items Assist x 1;Assist x 2; Increased time required;Verbal cues   Stand to Sit 3  Moderate assistance   Additional items Assist x 1; Increased time required;Verbal cues   Functional Mobility   Functional Mobility 3  Moderate assistance   Additional Comments Pt was able to takea  few steps within room with mod A x2 and frequent VC for correct technique  Additional items Rolling walker   Subjective   Subjective "What are we doing here"   Cognition   Overall Cognitive Status Impaired   Arousal/Participation Responsive;Arousable   Attention Difficulty attending to directions   Orientation Level Oriented to person   Memory Decreased recall of precautions;Decreased recall of recent events   Following Commands Follows one step commands with increased time or repetition   Comments Pt presents with very flat affect and requries increased time for processing throughout  Activity Tolerance   Activity Tolerance Treatment limited secondary to medical complications (Comment); Patient limited by fatigue  (cognition)   Medical Staff Made Aware RN confirmed okay to see pt and udpated after   Assessment   Assessment Patient participated in Skilled OT session this date with interventions consisting of ADL re training with the use of correct body mechanics,  therapeutic activities to: increase activity tolerance and increase OOB/ sitting tolerance   Patient agreeable to OT treatment session, upon arrival patient was found supine in bed  Patient requiring verbal cues for safety, verbal cues for correct technique and frequent rest periods  Patient continues to be functioning below baseline level, occupational performance remains limited secondary to factors listed above and increased risk for falls and injury  From OT standpoint, recommendation at time of d/c would be Short Term Rehab     Patient to benefit from continued Occupational Therapy treatment while in the hospital to address deficits as defined above and maximize level of functional independence with ADLs and functional mobility  Plan   Treatment Interventions ADL retraining;Functional transfer training; Endurance training;Cognitive reorientation   Goal Expiration Date 11/21/22   OT Treatment Day 1   OT Frequency 2-3x/wk   Recommendation   OT Discharge Recommendation Post acute rehabilitation services   AM-PAC Daily Activity Inpatient   Lower Body Dressing 2   Bathing 2   Toileting 2   Upper Body Dressing 2   Grooming 3   Eating 3   Daily Activity Raw Score 14   Daily Activity Standardized Score (Calc for Raw Score >=11) 33 39   AM-PAC Applied Cognition Inpatient   Following a Speech/Presentation 1   Understanding Ordinary Conversation 2   Taking Medications 1   Remembering Where Things Are Placed or Put Away 2   Remembering List of 4-5 Errands 2   Taking Care of Complicated Tasks 1   Applied Cognition Raw Score 9   Applied Cognition Standardized Score 22 48   Modified [place] Scale   Modified [place] Scale 4       NANDO Burgess, OTR/L

## 2022-11-09 NOTE — ASSESSMENT & PLAN NOTE
-history of antiphospholipid antibody on PTA Coumadin  -had ischemic CVA despite being on Eliquis in the past  -restarting warfarin as noted above  -inr is subtherapeutic, will continue bridge with lovenox

## 2022-11-09 NOTE — ASSESSMENT & PLAN NOTE
-WBC 14 K, heart rate 114,  lactic acid 2 3 on admission  -chest x-ray with no obvious focal infiltrate; daughter reports diffuse weakness and at times patient coughing after eating  Diet modified by speech therapy  -urine with foul odor-urinalysis grossly negative    Plan   Patient is afebrile, with stable procalcitonin, negative UA and negative x-ray     Could consider silent aspiration as potential source of infection; however, imaging appears negative currently  S/p 3 days of ceftriaxone

## 2022-11-09 NOTE — PLAN OF CARE
Problem: OCCUPATIONAL THERAPY ADULT  Goal: Performs self-care activities at highest level of function for planned discharge setting  See evaluation for individualized goals  Description: Treatment Interventions: ADL retraining, Functional transfer training, UE strengthening/ROM, Endurance training, Patient/family training, Cognitive reorientation, Equipment evaluation/education, Compensatory technique education, Continued evaluation, Energy conservation, Activityengagement          See flowsheet documentation for full assessment, interventions and recommendations  Outcome: Progressing  Note: Limitation: Decreased ADL status, Decreased endurance, Decreased cognition, Decreased self-care trans, Decreased high-level ADLs  Prognosis: Fair  Assessment: Patient participated in Skilled OT session this date with interventions consisting of ADL re training with the use of correct body mechanics,  therapeutic activities to: increase activity tolerance and increase OOB/ sitting tolerance   Patient agreeable to OT treatment session, upon arrival patient was found supine in bed  Patient requiring verbal cues for safety, verbal cues for correct technique and frequent rest periods  Patient continues to be functioning below baseline level, occupational performance remains limited secondary to factors listed above and increased risk for falls and injury  From OT standpoint, recommendation at time of d/c would be Short Term Rehab  Patient to benefit from continued Occupational Therapy treatment while in the hospital to address deficits as defined above and maximize level of functional independence with ADLs and functional mobility       OT Discharge Recommendation: Post acute rehabilitation services

## 2022-11-09 NOTE — QUICK NOTE
INR from this morning noted subtherapeutic at 1 70  Patient has been dosed Coumadin 2 5mg for past 2 nights  Will order Coumadin 3mg for tonight  INR ordered for tomorrow morning  Will base tomorrow's coumadin dosing based on INR level tomorrow morning

## 2022-11-09 NOTE — ASSESSMENT & PLAN NOTE
-CPK > 7000 on admission with DAYAN  -in setting of minimal ambulation and has been going up the stairs by crawling in using his buttocks    CPK significant improved   OFF IVF  DAYAN resolved

## 2022-11-09 NOTE — ASSESSMENT & PLAN NOTE
· Outpatient follow-up with Neurology  No inpatient intervention as per Neurology  Will follow-up with Dr Wanda Richard as outpatient  Patient known to him for prior stroke

## 2022-11-09 NOTE — ASSESSMENT & PLAN NOTE
-previous admission from the end of September until the end of October at [facility] in which he was hospitalized for over a month for severe anxiety and inability to care for self  -per sister reportedly was able to ambulate prior to the hospitalization but after the hospitalization patient needing assistance with ambulation at home  -during hospitalization there patient developed hyponatremia during his course of stay and Zoloft that had been started was stopped  -PRN lorazepam  -recently discharged from inpatient psychiatry with mirtazapine and aripiprazole, discussed resumption with the patient but he reports excess sedation with these  -d/w psychiatry today, will add buspar 5mg BID, monitor for response

## 2022-11-10 PROBLEM — M62.82 RHABDOMYOLYSIS: Status: RESOLVED | Noted: 2022-11-04 | Resolved: 2022-11-09

## 2022-11-10 PROBLEM — A41.9 SEPSIS (HCC): Status: RESOLVED | Noted: 2022-11-04 | Resolved: 2022-11-10

## 2022-11-10 PROBLEM — E05.90 HYPERTHYROIDISM: Status: ACTIVE | Noted: 2022-11-10

## 2022-11-10 LAB
ANION GAP SERPL CALCULATED.3IONS-SCNC: 7 MMOL/L (ref 4–13)
BASOPHILS # BLD AUTO: 0.03 THOUSANDS/ÂΜL (ref 0–0.1)
BASOPHILS NFR BLD AUTO: 0 % (ref 0–1)
BUN SERPL-MCNC: 30 MG/DL (ref 5–25)
CALCIUM SERPL-MCNC: 9 MG/DL (ref 8.3–10.1)
CHLORIDE SERPL-SCNC: 118 MMOL/L (ref 96–108)
CK SERPL-CCNC: 120 U/L (ref 39–308)
CO2 SERPL-SCNC: 24 MMOL/L (ref 21–32)
CREAT SERPL-MCNC: 1.05 MG/DL (ref 0.6–1.3)
EOSINOPHIL # BLD AUTO: 0.03 THOUSAND/ÂΜL (ref 0–0.61)
EOSINOPHIL NFR BLD AUTO: 0 % (ref 0–6)
ERYTHROCYTE [DISTWIDTH] IN BLOOD BY AUTOMATED COUNT: 14.8 % (ref 11.6–15.1)
GFR SERPL CREATININE-BSD FRML MDRD: 75 ML/MIN/1.73SQ M
GLUCOSE SERPL-MCNC: 97 MG/DL (ref 65–140)
HCT VFR BLD AUTO: 35.5 % (ref 36.5–49.3)
HGB BLD-MCNC: 11.5 G/DL (ref 12–17)
IMM GRANULOCYTES # BLD AUTO: 0.04 THOUSAND/UL (ref 0–0.2)
IMM GRANULOCYTES NFR BLD AUTO: 1 % (ref 0–2)
INR PPP: 1.62 (ref 0.84–1.19)
LYMPHOCYTES # BLD AUTO: 1.03 THOUSANDS/ÂΜL (ref 0.6–4.47)
LYMPHOCYTES NFR BLD AUTO: 13 % (ref 14–44)
MAGNESIUM SERPL-MCNC: 2.4 MG/DL (ref 1.6–2.6)
MCH RBC QN AUTO: 30.7 PG (ref 26.8–34.3)
MCHC RBC AUTO-ENTMCNC: 32.4 G/DL (ref 31.4–37.4)
MCV RBC AUTO: 95 FL (ref 82–98)
MONOCYTES # BLD AUTO: 0.54 THOUSAND/ÂΜL (ref 0.17–1.22)
MONOCYTES NFR BLD AUTO: 7 % (ref 4–12)
NEUTROPHILS # BLD AUTO: 6.15 THOUSANDS/ÂΜL (ref 1.85–7.62)
NEUTS SEG NFR BLD AUTO: 79 % (ref 43–75)
NRBC BLD AUTO-RTO: 0 /100 WBCS
PHOSPHATE SERPL-MCNC: 2.8 MG/DL (ref 2.3–4.1)
PHYTONADIONE SERPL-MCNC: <0.1 NG/ML (ref 0.1–2.2)
PLATELET # BLD AUTO: 164 THOUSANDS/UL (ref 149–390)
PMV BLD AUTO: 11.7 FL (ref 8.9–12.7)
POTASSIUM SERPL-SCNC: 3.6 MMOL/L (ref 3.5–5.3)
PROTHROMBIN TIME: 19.4 SECONDS (ref 11.6–14.5)
RBC # BLD AUTO: 3.74 MILLION/UL (ref 3.88–5.62)
SODIUM SERPL-SCNC: 149 MMOL/L (ref 135–147)
T3FREE SERPL-MCNC: 1.21 PG/ML (ref 2.3–4.2)
T4 FREE SERPL-MCNC: 1.6 NG/DL (ref 0.76–1.46)
TSH SERPL DL<=0.05 MIU/L-ACNC: 0.75 UIU/ML (ref 0.45–4.5)
WBC # BLD AUTO: 7.82 THOUSAND/UL (ref 4.31–10.16)

## 2022-11-10 RX ORDER — FLUTICASONE PROPIONATE 50 MCG
1 SPRAY, SUSPENSION (ML) NASAL DAILY
Status: DISCONTINUED | OUTPATIENT
Start: 2022-11-10 | End: 2022-12-09 | Stop reason: HOSPADM

## 2022-11-10 RX ORDER — DEXTROSE AND POTASSIUM CHLORIDE 5; .15 G/100ML; G/100ML
75 SOLUTION INTRAVENOUS CONTINUOUS
Status: DISCONTINUED | OUTPATIENT
Start: 2022-11-10 | End: 2022-11-11

## 2022-11-10 RX ORDER — WARFARIN SODIUM 3 MG/1
3 TABLET ORAL
Status: COMPLETED | OUTPATIENT
Start: 2022-11-10 | End: 2022-11-11

## 2022-11-10 RX ORDER — ECHINACEA PURPUREA EXTRACT 125 MG
1 TABLET ORAL EVERY 6 HOURS
Status: DISCONTINUED | OUTPATIENT
Start: 2022-11-10 | End: 2022-11-17

## 2022-11-10 RX ORDER — ENOXAPARIN SODIUM 100 MG/ML
1 INJECTION SUBCUTANEOUS EVERY 12 HOURS SCHEDULED
Status: DISCONTINUED | OUTPATIENT
Start: 2022-11-10 | End: 2022-11-10

## 2022-11-10 RX ORDER — CARVEDILOL 12.5 MG/1
12.5 TABLET ORAL 2 TIMES DAILY WITH MEALS
Status: DISCONTINUED | OUTPATIENT
Start: 2022-11-10 | End: 2022-11-17

## 2022-11-10 RX ADMIN — ENOXAPARIN SODIUM 90 MG: 100 INJECTION SUBCUTANEOUS at 22:49

## 2022-11-10 RX ADMIN — THIAMINE HCL TAB 100 MG 100 MG: 100 TAB at 09:29

## 2022-11-10 RX ADMIN — PANTOPRAZOLE SODIUM 40 MG: 40 TABLET, DELAYED RELEASE ORAL at 09:28

## 2022-11-10 RX ADMIN — DOCUSATE SODIUM 100 MG: 100 CAPSULE, LIQUID FILLED ORAL at 09:29

## 2022-11-10 RX ADMIN — FOLIC ACID 1 MG: 1 TABLET ORAL at 09:29

## 2022-11-10 RX ADMIN — POTASSIUM CHLORIDE 20 MEQ: 1500 TABLET, EXTENDED RELEASE ORAL at 11:11

## 2022-11-10 RX ADMIN — ENOXAPARIN SODIUM 90 MG: 100 INJECTION SUBCUTANEOUS at 09:28

## 2022-11-10 RX ADMIN — SENNOSIDES 17.2 MG: 8.6 TABLET, FILM COATED ORAL at 09:29

## 2022-11-10 RX ADMIN — PANTOPRAZOLE SODIUM 40 MG: 40 TABLET, DELAYED RELEASE ORAL at 18:02

## 2022-11-10 RX ADMIN — BUSPIRONE HYDROCHLORIDE 5 MG: 5 TABLET ORAL at 09:30

## 2022-11-10 RX ADMIN — POTASSIUM CHLORIDE 20 MEQ: 1500 TABLET, EXTENDED RELEASE ORAL at 09:28

## 2022-11-10 RX ADMIN — DEXTROSE AND POTASSIUM CHLORIDE 75 ML/HR: 5; .15 SOLUTION INTRAVENOUS at 14:00

## 2022-11-10 RX ADMIN — B-COMPLEX W/ C & FOLIC ACID TAB 1 TABLET: TAB at 09:29

## 2022-11-10 RX ADMIN — SALINE NASAL SPRAY 1 SPRAY: 1.5 SOLUTION NASAL at 22:51

## 2022-11-10 NOTE — PROGRESS NOTES
[address] [facility]  Progress Note - Nima Lucero 1960, 58 y o  male MRN: [id number]  Unit/Bed#: [facility] 907-01 Encounter: [id number]  Primary Care Provider: Don Spence MD   Date and time admitted to hospital: 11/3/2022  3:28 PM    Atrial fibrillation [facility])  Assessment & Plan  -on PTA carvedilol and warfarin  Plan  Will target INR between 2 5 in 3 5 for hypercoagulable state  Continue lovenox to warfarin bridge  Restart coreg 12 5 BID    * Rhabdomyolysis-resolved as of 11/9/2022  Assessment & Plan  -CPK > 7000 on admission with DAYAN  -in setting of minimal ambulation and has been going up the stairs by crawling in using his buttocks    CPK significant improved   DAYAN resolved     Sepsis (HCC)-resolved as of 11/10/2022  Assessment & Plan  -WBC 14 K, heart rate 114,  lactic acid 2 3 on admission  -chest x-ray with no obvious focal infiltrate; daughter reports diffuse weakness and at times patient coughing after eating  Diet modified by speech therapy  -urine with foul odor-urinalysis grossly negative    Plan   Patient is afebrile, with stable procalcitonin, negative UA and negative x-ray     Could consider silent aspiration as potential source of infection; however, imaging appears negative currently  S/p 3 days of ceftriaxone    Hyperthyroidism  Assessment & Plan  Per chart review patient has had low TSH and elevated free T4 in the past   Most recent labs during this admission with TSH 0 282 f t4 1 56  thyroid U/S during last admission at [facility] was unremarkable   Has seen Endo in the past during previous admission at [facility] (oct 22) and they recommend get repeat TFT, TSI and thyroid abs   Symptomatically, patient is tachycardic     Thyroid labs ordered   Endo consult placed    SHIMON (generalized anxiety disorder)  Assessment & Plan  -previous admission from the end of September until the end of October at [facility] in which he was hospitalized for over a month for severe anxiety and inability to care for self  -per sister reportedly was able to ambulate prior to the hospitalization but after the hospitalization patient needing assistance with ambulation at home  -during hospitalization there patient developed hyponatremia during his course of stay and Zoloft that had been started was stopped  -PRN lorazepam  -recently discharged from inpatient psychiatry with mirtazapine and aripiprazole, discussed resumption with the patient but he reports excess sedation with these  -d/w psychiatry started buspar 5mg BID, monitor for response    Supratherapeutic INR-resolved as of 11/9/2022  Assessment & Plan  Increased increased to 9 has history of antiphospholipid syndrome and AFib  -no evidence of bleeding  Plan  · > as INR mildly elevated and no source of bleeding, will hold off giving any reversal agents of supratherapeutic INR especially given patient's history of antiphospholipid syndrome and history of 3 previous CVA as well as having ischemic   · likely etiology of of elevated INR is multifactorial   Suspect poor nutrition in conjunction with vitamin K antagonism of Coumadin as etiology of elevated INR  Patient INR trending downward with holding of Coumadin  Given INR is less than 10 and history of hypercoagulable state in the setting of stroke history would prefer to monitor and trend off of Coumadin  continue warfarin , bridge with lovenox until therapeutic    Severe protein-calorie malnutrition ([facility] [address] )  Assessment & Plan  Malnutrition Findings:   Adult Malnutrition type: Chronic illness  Adult Degree of Malnutrition: Other severe protein calorie malnutrition  Malnutrition Characteristics: Inadequate energy, Weight loss                  360 Statement: Severe Pro/cathryn malnutrition r/t inadequate intake/anxiety as evidenced by 28% unplanned weight loss since 5/3/22, consuming < 75% energy intake compared to estimated enrgy needs > 1 month   Treatment TBD s/p swallow eval    BMI Findings: Body mass index is 29 4 kg/m²  CVA (cerebral vascular accident) ([facility] [address] )  Assessment & Plan  -Hx CVA x 3 in setting of antiphospholipid syndrome  -on PTA warfarin although noted supratherapeutic INR  -CT head in ER: No acute intracranial abnormality  Encephalomalacia involving the right occipital lobe and bilateral cerebelli  Grossly stable appearance of periventricular hypoattenuation compared to MRI brain 8/29/2022, likely representing chronic microvascular ischemic changes  PT/ ot consulted and recommending rehab    Tremor of right hand  Assessment & Plan  · Outpatient follow-up with Neurology  No inpatient intervention as per Neurology  Will follow-up with Dr Jessica Jean as outpatient  Patient known to him for prior stroke  Antiphospholipid antibody with hypercoagulable state ([facility] [address] )  Assessment & Plan  -history of antiphospholipid antibody on PTA Coumadin  -had ischemic CVA despite being on Eliquis in the past  -restarting warfarin as noted above  -inr is subtherapeutic, will continue bridge with lovenox    DAYAN (acute kidney injury) (HCC)-resolved as of 11/6/2022  Assessment & Plan  -see rhabdomyolysis section    Hypernatremia-resolved as of 11/5/2022  Assessment & Plan  -sodium 151, DAYAN, rhabdo on admission  Note Nephrology input  Slowly improving   -initial thought process was this was a hypovolemic hypernatremia  -CT Head: No acute intracranial abnormality  Encephalomalacia involving the right occipital lobe and bilateral cerebelli  Grossly stable appearance of periventricular hypoattenuation compared to MRI brain 8/29/2022, likely representing chronic microvascular ischemic changes  Plan  > as above in rhabdo section  VTE Pharmacologic Prophylaxis: VTE Score: 3 Moderate Risk (Score 3-4) - Pharmacological DVT Prophylaxis Ordered: warfarin (Coumadin)  Patient Centered Rounds: I performed bedside rounds with nursing staff today    Discussions with Specialists or Other Care Team Provider: endo      Time Spent for Care: 20 minutes  More than 50% of total time spent on counseling and coordination of care as described above  Current Length of Stay: 7 day(s)  Current Patient Status: Inpatient   Certification Statement: The patient will continue to require additional inpatient hospital stay due to subtheraputic INR  Discharge Plan: Anticipate discharge in 48-72 hrs to rehab facility  Code Status: Level 3 - DNAR and DNI    Subjective:   No overnight events  Patient seen and examined at bedside  Slow to respond  Flat affect  States he feels very lethargic  No appetite  + small bowel movement overnight     Objective:     Vitals:   Temp (24hrs), Av 8 °F (36 6 °C), Min:97 5 °F (36 4 °C), Max:98 1 °F (36 7 °C)    Temp:  [97 5 °F (36 4 °C)-98 1 °F (36 7 °C)] 98 1 °F (36 7 °C)  HR:  [] 104  Resp:  [17-18] 18  BP: (123-133)/(80-84) 125/80  SpO2:  [95 %-98 %] 95 %  Body mass index is 29 4 kg/m²  Input and Output Summary (last 24 hours): Intake/Output Summary (Last 24 hours) at 11/10/2022 1215  Last data filed at 11/10/2022 0900  Gross per 24 hour   Intake 100 ml   Output --   Net 100 ml       Physical Exam:   Physical Exam  Vitals and nursing note reviewed  Constitutional:       General: He is awake  He is not in acute distress  Appearance: He is well-developed and overweight  HENT:      Head: Normocephalic and atraumatic  Mouth/Throat:      Mouth: Mucous membranes are dry  Eyes:      Conjunctiva/sclera: Conjunctivae normal    Cardiovascular:      Rate and Rhythm: Normal rate and regular rhythm  Heart sounds: No murmur heard  Pulmonary:      Effort: Pulmonary effort is normal  No respiratory distress  Breath sounds: Normal breath sounds  Abdominal:      Palpations: Abdomen is soft  Tenderness: There is no abdominal tenderness  Musculoskeletal:      Cervical back: Neck supple  Skin:     General: Skin is warm and dry     Neurological:      Mental Status: He is oriented to person, place, and time  Psychiatric:         Mood and Affect: Affect is flat  Speech: Speech is delayed  Behavior: Behavior is slowed  Additional Data:     Labs:  Results from last 7 days   Lab Units 11/10/22  0528   WBC Thousand/uL 7 82   HEMOGLOBIN g/dL 11 5*   HEMATOCRIT % 35 5*   PLATELETS Thousands/uL 164   NEUTROS PCT % 79*   LYMPHS PCT % 13*   MONOS PCT % 7   EOS PCT % 0     Results from last 7 days   Lab Units 11/10/22  0528 11/04/22  0818 11/04/22  0000   SODIUM mmol/L 149*   < > 153*   POTASSIUM mmol/L 3 6   < > 3 1*   CHLORIDE mmol/L 118*   < > 117*   CO2 mmol/L 24   < > 28   BUN mg/dL 30*   < > 64*   CREATININE mg/dL 1 05   < > 1 88*   ANION GAP mmol/L 7   < > 8   CALCIUM mg/dL 9 0   < > 8 5   ALBUMIN g/dL  --   --  2 4*   TOTAL BILIRUBIN mg/dL  --   --  1 00   ALK PHOS U/L  --   --  73   ALT U/L  --   --  62   AST U/L  --   --  230*   GLUCOSE RANDOM mg/dL 97   < > 154*    < > = values in this interval not displayed  Results from last 7 days   Lab Units 11/10/22  0528   INR  1 62*     Results from last 7 days   Lab Units 11/09/22  1648 11/09/22  1111 11/09/22  0621 11/08/22  1611 11/08/22  1041 11/08/22  0736 11/07/22  2104 11/07/22  1546 11/07/22  1104 11/06/22  2047 11/06/22  1603 11/06/22  1037   POC GLUCOSE mg/dl 100 95 83 90 119 101 106 102 117 91 94 106         Results from last 7 days   Lab Units 11/04/22  0000 11/03/22  2300   LACTIC ACID mmol/L 1 5 2 4*   PROCALCITONIN ng/ml 0 24 0 29*       Lines/Drains:  Invasive Devices  Report    Peripheral Intravenous Line  Duration           Peripheral IV 11/08/22 Right Antecubital 2 days          Drain  Duration           External Urinary Catheter Large <1 day                      Imaging: Reviewed radiology reports from this admission including: chest xray and CT head    Recent Cultures (last 7 days):   Results from last 7 days   Lab Units 11/03/22  2300   BLOOD CULTURE  No Growth After 5 Days    No Growth After 5 Days  Last 24 Hours Medication List:   Current Facility-Administered Medications   Medication Dose Route Frequency Provider Last Rate   • busPIRone  5 mg Oral BID Judge Humberto MD     • carvedilol  12 5 mg Oral BID With Meals Tana Lott DO     • dextrose 5 % with KCl 20 mEq/L  75 mL/hr Intravenous Continuous Tana Lott DO     • docusate sodium  100 mg Oral BID Judge Humberto MD     • enoxaparin  1 mg/kg Subcutaneous Q12H [address] Judge Humberto MD     • folic acid  1 mg Oral Daily Judge Humberto MD     • LORazepam  0 5 mg Oral Q8H PRN Judge Humberto MD     • multivitamin stress formula  1 tablet Oral Daily Judge Humberto MD     • pantoprazole  40 mg Oral BID Linda English DO     • potassium chloride  20 mEq Oral TID With Meals Judge Humberto MD     • senna  2 tablet Oral Daily Judge Humberto MD     • thiamine  100 mg Oral Daily Judge Humberto MD     • warfarin  3 mg Oral Once (warfarin) Cole Villarreal DO          Today, Patient Was Seen By: Zhang Pozo    **Please Note: This note may have been constructed using a voice recognition system  **

## 2022-11-10 NOTE — ASSESSMENT & PLAN NOTE
Per chart review patient has had low TSH and elevated free T4 in the past   Most recent labs during this admission with TSH 0 282 f t4 1 56  thyroid U/S during last admission at [facility] was unremarkable   Has seen Endo in the past during previous admission at [facility] (oct 22) and they recommend get repeat TFT, TSI and thyroid abs   Symptomatically, patient is tachycardic     Thyroid labs ordered   Endo consult placed

## 2022-11-10 NOTE — NURSING NOTE
Patient refused all  Evening medications, including Coumadin  Dr Tyrone Luna [facility] notified via tiger text

## 2022-11-10 NOTE — ASSESSMENT & PLAN NOTE
Increased increased to 9 has history of antiphospholipid syndrome and AFib  -no evidence of bleeding  Plan  · > as INR mildly elevated and no source of bleeding, will hold off giving any reversal agents of supratherapeutic INR especially given patient's history of antiphospholipid syndrome and history of 3 previous CVA as well as having ischemic   · likely etiology of of elevated INR is multifactorial   Suspect poor nutrition in conjunction with vitamin K antagonism of Coumadin as etiology of elevated INR  Patient INR trending downward with holding of Coumadin  Given INR is less than 10 and history of hypercoagulable state in the setting of stroke history would prefer to monitor and trend off of Coumadin    continue warfarin , bridge with lovenox until therapeutic

## 2022-11-10 NOTE — QUICK NOTE
Pt's INR subtherapeutic this morning at 1 62 this morning  Will dose 3mg of coumadin again for tonight  Patient also on therapeutic dose for Lovenox for bridging purposes  Will base tomorrow's coumadin dose based on am INR

## 2022-11-10 NOTE — CONSULTS
Consultation - Ophelia Becker 58 y o  male MRN: [id number]    Unit/Bed#: [facility] 907-01 Encounter: [id number]      Assessment/Plan     Assessment: This is a 58y o -year-old male with subclinical hyperthyroidism anxiety disorder, panic disorder, prior CVA, antiphospholipid syndrome on Coumadin, atrial fibrillation for which endocrinology was consulted for management of subclinical hyperthyroidism  1) Subclinical hyperthyroidism - Patient has history of low TSH with high normal free T4 since 2014  Labs similar during current hospitalizations  Differentials may include Graves disease or toxic adenoma of the thyroid  Unclear why patient's TSH is abnormal on repeat testing concerning for problem with assay  · 11/04/2022 TSH of 0 282 (low), free T4 1 56 (high) on 11/04   · 11/10/2022 TSH 0 753 (normal), free T4 1 6 (high), free T3 1 21 (low)  · 10/2022 US Thyroid unremarkable   · 03/29/2017 Catecholamines panel normal   · 07/28/2022 CT AP with contrast with unremarkable adrenal glands     Plan:  · Will follow-up TSI and thyroid antibody test   · Recommend outpatient follow-up with endocrinology post discharge with repeat TSH in 6 weeks    · May benefit from thyroid uptake scan as outpatient   · No need for NT thyroid medication based on current thyroid function test  · Consider outpatient 24 hour metanephrines collection  · Recommend avoiding supplements especially biotin at least 4 days prior to retesting TSH in the future as it can interfere with assay    Inpatient consult to Endocrinology  Consult performed by: Gaudencio Burgess DO  Consult ordered by: Winnie Sagastume DO          CC:  Subclinical hyperthyroidism    History of Present Illness     HPI: Ophelia Becker is a 58y o  year old male with past medical history of subclinical hyperthyroidism, anxiety, panic disorder, prior CVA, antiphospholipid antibody syndrome on Coumadin, atrial fibrillation, hypertension, CORIE who initially presented to the hospital on 11/4 with ambulatory dysfunction  Patient was noted to have significant rhabdomyolysis and DAYAN on admission which have resolved  On admission, patient was noted to have TSH of 0 282 (low), free T4 1 56 (high) on 11/04  Repeat labs today show TSH 0 753 (normal), free T4 1 6 (high), free T3 1 21 (low)  10/10/2022 thyroid ultrasound unremarkable  Endocrinology was consulted for further evaluation  Patient assessed at bedside  Patient reports history of abnormal thyroid studies in the past   Reports history of anxiety, tremors and palpitations  Currently, patient complains of significant weakness to the point he is not able to move his body  Reports poor oral intake  Review of Systems   Constitutional: Positive for appetite change and fatigue  Negative for chills and fever  HENT: Negative for congestion and trouble swallowing  Eyes: Negative for photophobia and visual disturbance  Respiratory: Negative for cough and shortness of breath  Cardiovascular: Positive for palpitations  Negative for chest pain and leg swelling  Gastrointestinal: Negative for abdominal pain, constipation, diarrhea, nausea and vomiting  Endocrine: Negative for cold intolerance, heat intolerance, polydipsia, polyphagia and polyuria  Genitourinary: Negative for dysuria and flank pain  Musculoskeletal: Negative for arthralgias, back pain and myalgias  Skin: Negative  Negative for rash and wound  Neurological: Positive for tremors and weakness  Negative for light-headedness and headaches  Psychiatric/Behavioral: Negative for agitation and confusion         Historical Information   Past Medical History:   Diagnosis Date   • Aneurysm of thoracic aorta     last assessed 11/20/17   • Anxiety     currently on no meds   • Arthritis    • Bilateral inguinal hernia    • Cardiac disease    • Cognitive impairment    • CVA (cerebral vascular accident) ([facility] [address] )    • Depression    • GERD (gastroesophageal reflux disease)    • Hearing loss of aging    • Heart disease    • Hyperlipidemia    • Hypertension    • Irritable bowel syndrome    • Kidney stone    • Obesity    • Occasional tremors     left arm since stroke   • Palpitations    • Panic attack    • PONV (postoperative nausea and vomiting)     and headache x 3 days   • Psychiatric illness    • Sciatica     right and left  side   • Shortness of breath     on exertion   • Sleep apnea     is not using a CPAP   • Sleep difficulties    • Spitting up blood 05/21/2021    Resolved   • Status post placement of implantable loop recorder    • Stroke ([facility] [address] ) 11/2014   • Stroke ([facility] [address] ) 03/2021   • TIA (transient ischemic attack)      Past Surgical History:   Procedure Laterality Date   • AORTA SURGERY      thoracic - aneurysmorrhaphy   • APPENDECTOMY     • ASCENDING AORTIC ANEURYSM REPAIR      resolved 8/19/15   • CARDIAC LOOP RECORDER     • CHOLECYSTECTOMY      laparoscopic   • COLONOSCOPY     • CYSTOSCOPY      with removal of object- stent removal   • KNEE ARTHROSCOPY Right 1996    with medial meniscus repair   • LITHOTRIPSY      renal   • ORTHOPEDIC SURGERY     • CO FRAGMENT KIDNEY STONE/ ESWL Left 5/17/2018    Procedure: LITHROTRIPSY EXTRACORPORAL SHOCKWAVE (ESWL);   Surgeon: Renee Ruelas MD;  Location: [facility];  Service: Urology   • CO LAP,INGUINAL HERNIA REPR,INITIAL Bilateral 12/9/2021    Procedure: ROBOTIC REPAIR HERNIA INGUINAL;  Surgeon: Ricki Mosley MD;  Location: [facility];  Service: General   • THUMB ARTHROSCOPY Right 1976    ligament repair   • UPPER GASTROINTESTINAL ENDOSCOPY       Social History   Social History     Substance and Sexual Activity   Alcohol Use Not Currently    Comment: very rare in past     Social History     Substance and Sexual Activity   Drug Use Never     Social History     Tobacco Use   Smoking Status Never Smoker   Smokeless Tobacco Never Used   Tobacco Comment    no second hand smoke exposure     Family History:   Family History   Problem Relation Age of Onset   • Diverticulitis Mother         of colon   • Nephrolithiasis Mother    • Emphysema Father    • Nephrolithiasis Sister    • Heart attack Maternal Grandfather 72   • Breast cancer Paternal Grandmother    • Lung cancer Paternal Grandfather    • Cancer Family    • Coronary artery disease Family    • Diabetes Family         sibling   • Hypertension Family         sibling   • Hernia Family         sibling       Meds/Allergies   Current Facility-Administered Medications   Medication Dose Route Frequency Provider Last Rate Last Admin   • busPIRone (BUSPAR) tablet 5 mg  5 mg Oral BID Unknown MD Ruchi   5 mg at 11/10/22 0930   • carvedilol (COREG) tablet 12 5 mg  12 5 mg Oral BID With Meals Jasmit Kaylie, DO       • dextrose 5 % with KCl 20 mEq/L infusion (premix)  75 mL/hr Intravenous Continuous Jasmit Kaylie, DO 75 mL/hr at 11/10/22 1400 75 mL/hr at 11/10/22 1400   • docusate sodium (COLACE) capsule 100 mg  100 mg Oral BID Unknown MD Ruchi   100 mg at 11/10/22 0929   • enoxaparin (LOVENOX) subcutaneous injection 90 mg  1 mg/kg Subcutaneous Q12H [address] Unknown MD Ruchi   90 mg at 11/10/22 0928   • fluticasone (FLONASE) 50 mcg/act nasal spray 1 spray  1 spray Each Nare Daily Tana Suhia, DO       • folic acid (FOLVITE) tablet 1 mg  1 mg Oral Daily Unknown MD Ruchi   1 mg at 11/10/22 8522   • LORazepam (ATIVAN) tablet 0 5 mg  0 5 mg Oral Q8H PRN Unknown MD Ruchi       • multivitamin stress formula tablet 1 tablet  1 tablet Oral Daily Unknown MD Ruchi   1 tablet at 11/10/22 0929   • pantoprazole (PROTONIX) EC tablet 40 mg  40 mg Oral BID Steffany Baez DO   40 mg at 11/10/22 1802   • potassium chloride (K-DUR,KLOR-CON) CR tablet 20 mEq  20 mEq Oral TID With Meals Unknown MD Ruchi   20 mEq at 11/10/22 1111   • senna (SENOKOT) tablet 17 2 mg  2 tablet Oral Daily Unknown MD Ruchi   17 2 mg at 11/10/22 0929   • sodium chloride (OCEAN) 0 65 % nasal spray 1 spray  1 spray Each Nare Q6H Tana Lott, DO       • thiamine tablet 100 mg  100 mg Oral Daily Sherren Pax, MD   100 mg at 11/10/22 4498   • warfarin (COUMADIN) tablet 3 mg  3 mg Oral Once (warfarin) Oneta Shy, DO         Allergies   Allergen Reactions   • Losartan Angioedema   • Aspirin Fatigue     Due to blood thinners     • Bactrim [Sulfamethoxazole-Trimethoprim]    • Eliquis [Apixaban] Other (See Comments)     Failed  Had embolic CVA   • Lisinopril      Felt bad, was OK with Zestril brand name  • Tramadol        Objective   Vitals: Blood pressure 126/80, pulse (!) 106, temperature 98 1 °F (36 7 °C), resp  rate 18, height 5' 8" (1 727 m), weight 87 7 kg (193 lb 5 5 oz), SpO2 95 %  Intake/Output Summary (Last 24 hours) at 11/10/2022 1816  Last data filed at 11/10/2022 0900  Gross per 24 hour   Intake 100 ml   Output --   Net 100 ml     Invasive Devices  Report    Peripheral Intravenous Line  Duration           Peripheral IV 11/08/22 Right Antecubital 2 days          Drain  Duration           External Urinary Catheter Large <1 day                Physical Exam  Vitals reviewed  Constitutional:       General: He is not in acute distress  Appearance: Normal appearance  He is ill-appearing  HENT:      Head: Normocephalic and atraumatic  Eyes:      General: No scleral icterus  Conjunctiva/sclera: Conjunctivae normal    Neck:      Comments: Thyroid nontender, no nodules palpated  Cardiovascular:      Rate and Rhythm: Regular rhythm  Tachycardia present  Pulses: Normal pulses  Heart sounds: Normal heart sounds  No murmur heard  Pulmonary:      Effort: Pulmonary effort is normal  No respiratory distress  Breath sounds: Normal breath sounds  No wheezing or rales  Abdominal:      General: Bowel sounds are normal       Palpations: Abdomen is soft  Tenderness: There is no abdominal tenderness  Musculoskeletal:      Cervical back: Normal range of motion  Right lower leg: No edema  Left lower leg: No edema     Skin: General: Skin is warm and dry  Neurological:      General: No focal deficit present  Mental Status: He is alert  Mental status is at baseline  Motor: Weakness present  Comments: Globally weak  Tremors prominent the upper extremities   Psychiatric:         Mood and Affect: Mood normal          Behavior: Behavior normal          The history was obtained from the review of the chart, patient and family  Lab Results:       Lab Results   Component Value Date    WBC 7 82 11/10/2022    HGB 11 5 (L) 11/10/2022    HCT 35 5 (L) 11/10/2022    MCV 95 11/10/2022     11/10/2022     Lab Results   Component Value Date/Time    BUN 30 (H) 11/10/2022 05:28 AM    BUN 10 02/02/2018 08:42 AM     03/29/2017 08:04 AM    K 3 6 11/10/2022 05:28 AM    K 3 8 02/02/2018 08:42 AM     (H) 11/10/2022 05:28 AM     02/02/2018 08:42 AM    CO2 24 11/10/2022 05:28 AM    CO2 31 02/02/2018 08:42 AM    CREATININE 1 05 11/10/2022 05:28 AM    CREATININE 1 18 03/29/2017 08:04 AM     (H) 11/04/2022 12:00 AM    AST 19 02/02/2018 08:42 AM    ALT 62 11/04/2022 12:00 AM    ALT 23 02/02/2018 08:42 AM    ALB 2 4 (L) 11/04/2022 12:00 AM    ALB 4 0 03/01/2017 09:14 AM    GLOB 2 4 02/02/2018 08:42 AM     No results for input(s): CHOL, HDL, LDL, TRIG, VLDL in the last 72 hours  No results found for: Marissa Cobos  POC Glucose   Date Value   11/09/2022 100 mg/dl   11/09/2022 95 mg/dl   11/06/2014 140 mg/dL   11/06/2014 198 mg/dL (H)       Imaging Studies: I have personally reviewed pertinent reports  Portions of the record may have been created with voice recognition software       Curly Humphrey 15 Internal Medicine PGY-3

## 2022-11-10 NOTE — ASSESSMENT & PLAN NOTE
-on PTA carvedilol and warfarin  Plan  Will target INR between 2 5 in 3 5 for hypercoagulable state  Continue lovenox to warfarin bridge  Restart coreg 12 5 BID

## 2022-11-10 NOTE — PLAN OF CARE
Problem: Potential for Falls  Goal: Patient will remain free of falls  Description: INTERVENTIONS:  - Educate patient/family on patient safety including physical limitations  - Instruct patient to call for assistance with activity   - Consult OT/PT to assist with strengthening/mobility   - Keep Call bell within reach  - Keep bed low and locked with side rails adjusted as appropriate  - Keep care items and personal belongings within reach  - Initiate and maintain comfort rounds  - Make Fall Risk Sign visible to staff  - Apply yellow socks and bracelet for high fall risk patients  - Consider moving patient to room near nurses station  Outcome: Progressing     Problem: PAIN - ADULT  Goal: Verbalizes/displays adequate comfort level or baseline comfort level  Description: Interventions:  - Encourage patient to monitor pain and request assistance  - Assess pain using appropriate pain scale  - Administer analgesics based on type and severity of pain and evaluate response  - Implement non-pharmacological measures as appropriate and evaluate response  - Consider cultural and social influences on pain and pain management  - Notify physician/advanced practitioner if interventions unsuccessful or patient reports new pain  Outcome: Progressing     Problem: SAFETY ADULT  Goal: Maintains/Returns to pre admission functional level  Description: INTERVENTIONS:  - Perform BMAT or MOVE assessment daily    - Set and communicate daily mobility goal to care team and patient/family/caregiver     - Collaborate with rehabilitation services on mobility goals if consulted  - Out of bed for toileting  - Record patient progress and toleration of activity level   Outcome: Progressing     Problem: Prexisting or High Potential for Compromised Skin Integrity  Goal: Skin integrity is maintained or improved  Description: INTERVENTIONS:  - Identify patients at risk for skin breakdown  - Assess and monitor skin integrity  - Assess and monitor nutrition and hydration status  - Monitor labs   - Assess for incontinence   - Turn and reposition patient  - Assist with mobility/ambulation  - Relieve pressure over bony prominences  - Avoid friction and shearing  - Provide appropriate hygiene as needed including keeping skin clean and dry  - Evaluate need for skin moisturizer/barrier cream  - Collaborate with interdisciplinary team   - Patient/family teaching  - Consider wound care consult   Outcome: Progressing     Problem: Nutrition/Hydration-ADULT  Goal: Nutrient/Hydration intake appropriate for improving, restoring or maintaining nutritional needs  Description: Monitor and assess patient's nutrition/hydration status for malnutrition  Collaborate with interdisciplinary team and initiate plan and interventions as ordered  Monitor patient's weight and dietary intake as ordered or per policy  Utilize nutrition screening tool and intervene as necessary  Determine patient's food preferences and provide high-protein, high-caloric foods as appropriate       INTERVENTIONS:  - Monitor oral intake, urinary output, labs, and treatment plans  - Assess nutrition and hydration status and recommend course of action  - Evaluate amount of meals eaten  - Assist patient with eating if necessary   - Allow adequate time for meals  - Recommend/ encourage appropriate diets, oral nutritional supplements, and vitamin/mineral supplements  - Order, calculate, and assess calorie counts as needed  - Recommend, monitor, and adjust tube feedings and TPN/PPN based on assessed needs  - Assess need for intravenous fluids  - Provide specific nutrition/hydration education as appropriate  - Include patient/family/caregiver in decisions related to nutrition  Outcome: Progressing

## 2022-11-10 NOTE — ASSESSMENT & PLAN NOTE
-previous admission from the end of September until the end of October at [facility] in which he was hospitalized for over a month for severe anxiety and inability to care for self  -per sister reportedly was able to ambulate prior to the hospitalization but after the hospitalization patient needing assistance with ambulation at home  -during hospitalization there patient developed hyponatremia during his course of stay and Zoloft that had been started was stopped  -PRN lorazepam  -recently discharged from inpatient psychiatry with mirtazapine and aripiprazole, discussed resumption with the patient but he reports excess sedation with these  -d/w psychiatry started buspar 5mg BID, monitor for response

## 2022-11-10 NOTE — CASE MANAGEMENT
Case Management Discharge Planning Note    Patient name Anali Urbina  Location [address] 907/[address] 067-77 MRN [id number]  : 1960 Date 11/10/2022       Current Admission Date: 11/3/2022  Current Admission Diagnosis:Atrial fibrillation [facility])   Patient Active Problem List    Diagnosis Date Noted   • Hyperthyroidism 11/10/2022   • Severe protein-calorie malnutrition ([facility] ) 2022   • Hyponatremia 10/14/2022   • SIADH (syndrome of inappropriate ADH production) ([facility] ) 10/14/2022   • Subclinical hyperthyroidism 10/14/2022   • Sinus arrhythmia 10/10/2022   • Mild protein-calorie malnutrition ([place] [address] ) 10/07/2022   • Medical clearance for incarceration 2022   • 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 attack 2022   • Atypical chest pain 2022   • Hypokalemia 2022   • Dizziness 2022   • Renal cyst 2022   • PVD (peripheral vascular disease) ([facility] ) 2022   • Ataxia 2022   • CVA (cerebral vascular accident) ([place] [address] ) 2022   • Right inguinal pain 2022   • Alkaline phosphatase elevation 2022   • Low back pain 2022   • S/P laparoscopic hernia repair 2021   • Non-recurrent bilateral inguinal hernia without obstruction or gangrene 2021   • Gastric polyps 2021   • Gastric AVM 2021   • Edema of both lower legs 2021   • Tremor of right hand 2021   • Weakness of both lower extremities 2021   • Anticoagulated on Coumadin 2021   • CORIE (obstructive sleep apnea)    • Hyperbilirubinemia 08/10/2020   • Antiphospholipid antibody with hypercoagulable state ([facility] ) 2020   • History of stroke 2020   • Other viral warts 2019   • Physical deconditioning 2019   • Class 2 obesity in adult 2018   • Bright red blood per rectum 11/10/2017   • Numbness 2017   • H/O aortic aneurysm repair 2017   • Hypertension 2017   • GERD (gastroesophageal reflux disease) 2017   • Hyperlipidemia 03/17/2017   • Peyronie disease 12/09/2016   • Vitamin D deficiency 06/15/2016   • Atrial fibrillation ([address] ) 11/17/2014   • SHIMON (generalized anxiety disorder) 11/11/2014   • Constipation 09/19/2014   • Irritable bowel syndrome 04/24/2014   • Kidney stones 04/24/2014      LOS (days): 7  Geometric Mean LOS (GMLOS) (days): 5 00  Days to GMLOS:-1 7     OBJECTIVE:  Risk of Unplanned Readmission Score: 35 6         Current admission status: Inpatient   Preferred Pharmacy:   [address] [place] [address] [address] [facility] [address]  Phone: [phone] Fax: [phone]    Primary Care Provider: Jatin Gutierrez MD    Primary Insurance: [address] [place] [address]  Secondary Insurance:     DISCHARGE DETAILS:                   Additional Comments: CM spoke with CM who was just on case  Confirmed that options paperwork was sent to [facility] yesterday 11/9  Of note, patient is not medically stable for d/c at this time  Continues with subtherapeutic INR

## 2022-11-10 NOTE — ASSESSMENT & PLAN NOTE
· Outpatient follow-up with Neurology  No inpatient intervention as per Neurology  Will follow-up with Dr Salina Holt as outpatient  Patient known to him for prior stroke

## 2022-11-10 NOTE — ASSESSMENT & PLAN NOTE
-CPK > 7000 on admission with DAYAN  -in setting of minimal ambulation and has been going up the stairs by crawling in using his buttocks    CPK significant improved   DAYAN resolved

## 2022-11-11 ENCOUNTER — APPOINTMENT (INPATIENT)
Dept: RADIOLOGY | Facility: HOSPITAL | Age: 62
End: 2022-11-11

## 2022-11-11 PROBLEM — E86.0 DEHYDRATION: Status: ACTIVE | Noted: 2022-11-11

## 2022-11-11 LAB
ALBUMIN SERPL BCP-MCNC: 2.2 G/DL (ref 3.5–5)
ALP SERPL-CCNC: 71 U/L (ref 46–116)
ALT SERPL W P-5'-P-CCNC: 52 U/L (ref 12–78)
ANION GAP SERPL CALCULATED.3IONS-SCNC: 3 MMOL/L (ref 4–13)
AST SERPL W P-5'-P-CCNC: 37 U/L (ref 5–45)
BILIRUB SERPL-MCNC: 1.03 MG/DL (ref 0.2–1)
BUN SERPL-MCNC: 24 MG/DL (ref 5–25)
CALCIUM ALBUM COR SERPL-MCNC: 9.9 MG/DL (ref 8.3–10.1)
CALCIUM SERPL-MCNC: 8.5 MG/DL (ref 8.3–10.1)
CHLORIDE SERPL-SCNC: 119 MMOL/L (ref 96–108)
CO2 SERPL-SCNC: 27 MMOL/L (ref 21–32)
CREAT SERPL-MCNC: 1.1 MG/DL (ref 0.6–1.3)
GFR SERPL CREATININE-BSD FRML MDRD: 71 ML/MIN/1.73SQ M
GLUCOSE SERPL-MCNC: 126 MG/DL (ref 65–140)
INR PPP: 1.4 (ref 0.84–1.19)
POTASSIUM SERPL-SCNC: 3.4 MMOL/L (ref 3.5–5.3)
PROT SERPL-MCNC: 5.2 G/DL (ref 6.4–8.4)
PROTHROMBIN TIME: 17.4 SECONDS (ref 11.6–14.5)
SODIUM SERPL-SCNC: 149 MMOL/L (ref 135–147)

## 2022-11-11 RX ORDER — ESCITALOPRAM OXALATE 5 MG/1
5 TABLET ORAL DAILY
Status: DISCONTINUED | OUTPATIENT
Start: 2022-11-11 | End: 2022-11-21

## 2022-11-11 RX ORDER — PANTOPRAZOLE SODIUM 40 MG/10ML
40 INJECTION, POWDER, LYOPHILIZED, FOR SOLUTION INTRAVENOUS
Status: DISCONTINUED | OUTPATIENT
Start: 2022-11-11 | End: 2022-11-13 | Stop reason: SDUPTHER

## 2022-11-11 RX ORDER — WARFARIN SODIUM 3 MG/1
3 TABLET ORAL
Status: COMPLETED | OUTPATIENT
Start: 2022-11-11 | End: 2022-11-11

## 2022-11-11 RX ADMIN — FOLIC ACID 1 MG: 1 TABLET ORAL at 08:29

## 2022-11-11 RX ADMIN — SENNOSIDES 17.2 MG: 8.6 TABLET, FILM COATED ORAL at 08:29

## 2022-11-11 RX ADMIN — POTASSIUM CHLORIDE 20 MEQ: 1500 TABLET, EXTENDED RELEASE ORAL at 11:58

## 2022-11-11 RX ADMIN — CARVEDILOL 12.5 MG: 12.5 TABLET, FILM COATED ORAL at 17:29

## 2022-11-11 RX ADMIN — POTASSIUM CHLORIDE: 2 INJECTION, SOLUTION, CONCENTRATE INTRAVENOUS at 11:56

## 2022-11-11 RX ADMIN — POTASSIUM CHLORIDE 20 MEQ: 1500 TABLET, EXTENDED RELEASE ORAL at 17:29

## 2022-11-11 RX ADMIN — THIAMINE HCL TAB 100 MG 100 MG: 100 TAB at 08:29

## 2022-11-11 RX ADMIN — BUSPIRONE HYDROCHLORIDE 5 MG: 5 TABLET ORAL at 08:30

## 2022-11-11 RX ADMIN — IOHEXOL 95 ML: 350 INJECTION, SOLUTION INTRAVENOUS at 16:57

## 2022-11-11 RX ADMIN — ESCITALOPRAM OXALATE 5 MG: 5 TABLET, FILM COATED ORAL at 18:11

## 2022-11-11 RX ADMIN — WARFARIN SODIUM 3 MG: 3 TABLET ORAL at 17:38

## 2022-11-11 RX ADMIN — WARFARIN SODIUM 3 MG: 3 TABLET ORAL at 18:11

## 2022-11-11 RX ADMIN — POTASSIUM CHLORIDE: 2 INJECTION, SOLUTION, CONCENTRATE INTRAVENOUS at 21:46

## 2022-11-11 RX ADMIN — ENOXAPARIN SODIUM 90 MG: 100 INJECTION SUBCUTANEOUS at 21:48

## 2022-11-11 RX ADMIN — CARVEDILOL 12.5 MG: 12.5 TABLET, FILM COATED ORAL at 08:30

## 2022-11-11 RX ADMIN — PANTOPRAZOLE SODIUM 40 MG: 40 INJECTION, POWDER, FOR SOLUTION INTRAVENOUS at 11:58

## 2022-11-11 RX ADMIN — ENOXAPARIN SODIUM 90 MG: 100 INJECTION SUBCUTANEOUS at 08:30

## 2022-11-11 RX ADMIN — POTASSIUM CHLORIDE 20 MEQ: 1500 TABLET, EXTENDED RELEASE ORAL at 08:29

## 2022-11-11 RX ADMIN — B-COMPLEX W/ C & FOLIC ACID TAB 1 TABLET: TAB at 08:29

## 2022-11-11 RX ADMIN — BUSPIRONE HYDROCHLORIDE 5 MG: 5 TABLET ORAL at 17:29

## 2022-11-11 RX ADMIN — DEXTROSE AND POTASSIUM CHLORIDE 75 ML/HR: 5; .15 SOLUTION INTRAVENOUS at 04:34

## 2022-11-11 NOTE — ASSESSMENT & PLAN NOTE
Malnutrition Findings:   Adult Malnutrition type: Chronic illness  Adult Degree of Malnutrition: Other severe protein calorie malnutrition  Malnutrition Characteristics: Inadequate energy, Weight loss                  360 Statement: Severe Pro/cathryn malnutrition r/t inadequate intake/anxiety as evidenced by 28% unplanned weight loss since 5/3/22, consuming < 75% energy intake compared to estimated enrgy needs > 1 month  Treatment TBD s/p swallow eval    BMI Findings: Body mass index is 28 96 kg/m²       Calorie counts x 3 days

## 2022-11-11 NOTE — OCCUPATIONAL THERAPY NOTE
Occupational Therapy Progress Note     Patient Name: Jourdan Morillo  LRWVF'K Date: 11/11/2022  Problem List  Active Problems:    SHIMON (generalized anxiety disorder)    Atrial fibrillation (HCC)    Antiphospholipid antibody with hypercoagulable state ([facility] [address] )    Tremor of right hand    CVA (cerebral vascular accident) ([facility] [address] )    Severe protein-calorie malnutrition ([facility] [address] )    Hyperthyroidism          11/11/22 1128   OT Last Visit   OT Visit Date 11/11/22   Note Type   Note Type Treatment   Pain Assessment   Pain Assessment Tool FLACC   Pain Location/Orientation Orientation: Bilateral;Location: Leg   Hospital Pain Intervention(s) Repositioned; Ambulation/increased activity   Pain Rating: FLACC (Rest) - Face 0   Pain Rating: FLACC (Rest) - Legs 0   Pain Rating: FLACC (Rest) - Activity 0   Pain Rating: FLACC (Rest) - Cry 0   Pain Rating: FLACC (Rest) - Consolability 0   Score: FLACC (Rest) 0   Pain Rating: FLACC (Activity) - Face 1   Pain Rating: FLACC (Activity) - Legs 0   Pain Rating: FLACC (Activity) - Activity 0   Pain Rating: FLACC (Activity) - Cry 1   Pain Rating: FLACC (Activity) - Consolability 0   Score: FLACC (Activity) 2   Restrictions/Precautions   Other Precautions Cognitive; Chair Alarm; Bed Alarm;Multiple lines; Fall Risk;Pain   Lifestyle   Autonomy Per EMR, at baseline pt is I with ADLS and mobility  Reciprocal Relationships Sister   Service to Others Retired   [address] Enjoys "laying with blankets"   ADL   Where Assessed Edge of bed   Grooming Assistance 2  Maximal Assistance   Grooming Deficit Brushing hair;Wash/dry face   Grooming Comments + washing hair with shampoo cap   UB Bathing Assistance 2  Maximal Assistance   UB Bathing Deficit Chest;Right arm;Left arm; Abdomen   LB Bathing Assistance 2  Maximal Assistance   LB Bathing Deficit Right upper leg;Left upper leg;Right lower leg including foot; Left lower leg including foot   UB Dressing Assistance 2  Maximal Assistance   UB Dressing Deficit Thread RUE; Thread LUE   LB Dressing Assistance 2  Maximal Assistance   LB Dressing Deficit Don/doff R sock; Don/doff L sock   Bed Mobility   Supine to Sit 3  Moderate assistance   Additional items Assist x 2;HOB elevated; Bedrails; Increased time required;LE management   Sit to Supine 3  Moderate assistance   Additional items Assist x 2;HOB elevated; Bedrails; Increased time required;LE management   Additional Comments Pt requires MIN A to sit EOB, assist to place hands in proper position  Pt defers OOB despite encouragement   Transfers   Sit to Stand Unable to assess   Stand to Sit Unable to assess   Cognition   Overall Cognitive Status Impaired   Arousal/Participation Arousable   Attention Attends with cues to redirect   Orientation Level Oriented to person;Oriented to place   Memory Unable to assess   Following Commands Follows one step commands inconsistently   Comments Pt presents with flat affect, requires increased time for processing  Poor reponsiveness  Pt reports having difficulty getting his words out  Follows commands inconsistently   Activity Tolerance   Activity Tolerance Patient limited by fatigue;Patient limited by pain  (+ cognition)   Medical Staff Made Aware RN clearance for session, [place] Tarsha   Assessment   Assessment Patient participated in Skilled OT session this date with interventions consisting of ADL re training with the use of correct body mechnaics, Energy Conservation techniques, safety awareness and fall prevention techniques,  therapeutic activities to: increase activity tolerance, increase dynamic sit/ stand balance during functional activity  and increase OOB/ sitting tolerance   Upon arrival patient was found supine in bed  Pt demonstrated the following tasks: MOD A x 2 sup <> sit, MIN A to sit EOB, MAX A to roll  Pt performs bathing and dressing tasks with MAX A  Pt required hand over hand technique and increased time for all tasks   Patient continues to be functioning below baseline level, occupational performance remains limited secondary to factors listed above and increased risk for falls and injury  From OT standpoint, recommendation at time of d/c would be STR  Patient to benefit from continued Occupational Therapy treatment while in the hospital to address deficits as defined above and maximize level of functional independence with ADLs and functional mobility  Pt was left in bed with alarm on after session with all current needs met  The patient's raw score on the AM-PAC Daily Activity inpatient short form is 12, standardized score is 30 6, less than 39 4  Patients at this level are likely to benefit from discharge to post-acute rehabilitation services  Please refer to the recommendation of the Occupational Therapist for safe discharge planning  Plan   Treatment Interventions ADL retraining;Functional transfer training;UE strengthening/ROM; Endurance training;Cognitive reorientation;Patient/family training;Equipment evaluation/education; Compensatory technique education;Continued evaluation; Energy conservation; Activityengagement   Goal Expiration Date 11/21/22   OT Treatment Day 2   OT Frequency 2-3x/wk   Recommendation   OT Discharge Recommendation Post acute rehabilitation services   AM-PAC Daily Activity Inpatient   Lower Body Dressing 2   Bathing 2   Toileting 2   Upper Body Dressing 2   Grooming 2   Eating 2   Daily Activity Raw Score 12   Daily Activity Standardized Score (Calc for Raw Score >=11) 30 6   AM-PAC Applied Cognition Inpatient   Following a Speech/Presentation 1   Understanding Ordinary Conversation 2   Taking Medications 2   Remembering Where Things Are Placed or Put Away 2   Remembering List of 4-5 Errands 1   Taking Care of Complicated Tasks 1   Applied Cognition Raw Score 9   Applied Cognition Standardized Score 22 48       Karina Tripathi MS, OTR/L

## 2022-11-11 NOTE — ASSESSMENT & PLAN NOTE
-previous admission from the end of September until the end of October at [facility] in which he was hospitalized for over a month for severe anxiety and inability to care for self  -per sister reportedly was able to ambulate prior to the hospitalization but after the hospitalization patient needing assistance with ambulation at home  -during hospitalization there patient developed hyponatremia during his course of stay and Zoloft that had been started was stopped  -PRN lorazepam  -recently discharged from inpatient psychiatry with mirtazapine and aripiprazole, discussed resumption with the patient but he reports excess sedation with these  -d/w psychiatry started buspar 5mg BID, monitor for response  -patient now endorsing more feelings of depressed mood and feeling down  -d/w psych for additional reccs, will follow up

## 2022-11-11 NOTE — PHYSICAL THERAPY NOTE
PHYSICAL THERAPY NOTE          Patient Name: Rocky Ly  [id number]'[id number] Date: 11/11/2022 11/11/22 1021   PT Last Visit   PT Visit Date 11/11/22   Note Type   Note Type Treatment   Pain Assessment   Pain Assessment Tool FLACC   Pain Location/Orientation Location: Generalized   Hospital Pain Intervention(s) Repositioned; Ambulation/increased activity; Emotional support   Pain Rating: FLACC (Rest) - Face 0   Pain Rating: FLACC (Rest) - Legs 0   Pain Rating: FLACC (Rest) - Activity 0   Pain Rating: FLACC (Rest) - Cry 0   Pain Rating: FLACC (Rest) - Consolability 0   Score: FLACC (Rest) 0   Pain Rating: FLACC (Activity) - Face 1   Pain Rating: FLACC (Activity) - Legs 0   Pain Rating: FLACC (Activity) - Activity 1   Pain Rating: FLACC (Activity) - Cry 0   Pain Rating: FLACC (Activity) - Consolability 0   Score: FLACC (Activity) 2   Restrictions/Precautions   Weight Bearing Precautions Per Order No   Other Precautions Cognitive; Chair Alarm; Bed Alarm;Multiple lines; Fall Risk;Pain   General   Chart Reviewed Yes   Response to Previous Treatment Patient with no complaints from previous session  Family/Caregiver Present No   Cognition   Overall Cognitive Status Impaired   Arousal/Participation Responsive;Arousable   Attention Difficulty attending to directions   Orientation Level Oriented to person;Oriented to place   Memory Decreased recall of precautions;Decreased recall of recent events   Following Commands Follows one step commands with increased time or repetition   Comments pt with flat affect, limited verbalizations throughout session  Subjective   Subjective "I feel like i'm dead"   Bed Mobility   Rolling R 2  Maximal assistance   Additional items Assist x 1;Bedrails; Increased time required;Verbal cues   Rolling L 2  Maximal assistance   Additional items Assist x 1;Bedrails; Increased time required;Verbal cues   Supine to Sit 2 Maximal assistance   Additional items Assist x 2; Increased time required;Verbal cues;LE management   Sit to Supine 3  Moderate assistance   Additional items Assist x 2; Increased time required;Verbal cues;LE management   Additional Comments pt supine in bed upon arrival  Pt retuned to supine in bed with all needs irma reach- alarm on   Transfers   Sit to Stand Unable to assess   Stand to Sit Unable to assess   Additional Comments not appropriate to progress at this time 2* fatigue/ cognition   Balance   Static Sitting Poor +   Dynamic Sitting Poor   Endurance Deficit   Endurance Deficit Yes   Endurance Deficit Description generalized weakness, deconditioning   Activity Tolerance   Activity Tolerance Patient limited by fatigue   Nurse Made Aware RN cleared pt to participate in therapy session   Exercises   Balance training  sitting EOB ~5-6 min with min- mod A required to correct R lateral + posterior lean; BP taken / 71    Assessment   Prognosis Fair   Problem List Decreased strength;Decreased endurance; Impaired balance;Decreased mobility; Decreased cognition;Decreased safety awareness   Assessment Pt seen for PT treatment session this date  Therapy session focused on bed mobility and sitting tolerance/ balance in order to improve overall mobility and independence  Pt requires assist of 1-2 for all mobility performed this date  Pt found to have large BM upon arrival, rolling performed to assist in hygiene tasks- increased time required  Pt requiring increased A this date compared to previous session  Max Ax2 required to complete bed mobility tasks  Min- mod A required to stay seated EOB  Pt making slow progress toward goals  Pt was left supine in bed at the end of PT session with all needs in reach  Pt would benefit from continued PT services while in hospital to address remaining limitations  PT to continue to follow pt and recommends post-acute rehab services   The patient's AM-PAC Basic Mobility Inpatient Short Form Raw Score is 6  A Raw score of less than or equal to 16 suggests the patient may benefit from discharge to post-acute rehabilitation services  Please also refer to the recommendation of the Physical Therapist for safe discharge planning  Barriers to Discharge Inaccessible home environment   Goals   Patient Goals to get cleaned up   STG Expiration Date 11/16/22   PT Treatment Day 3   Plan   Treatment/Interventions LE strengthening/ROM; Endurance training;Cognitive reorientation;Patient/family training;Bed mobility;Spoke to nursing;Spoke to case management   Progress Slow progress, decreased activity tolerance   PT Frequency 3-5x/wk   Recommendation   PT Discharge Recommendation Post acute rehabilitation services   AM-PAC Basic Mobility Inpatient   Turning in Bed Without Bedrails 1   Lying on Back to Sitting on Edge of Flat Bed 1   Moving Bed to Chair 1   Standing Up From Chair 1   Walk in Room 1   Climb 3-5 Stairs 1   Basic Mobility Inpatient Raw Score 6   Highest Level Of Mobility   JH-HLM Goal 2: Bed activities/Dependent transfer   JH-HLM Achieved 3: Sit at edge of bed   Education   Education Provided Mobility training   Patient Reinforcement needed   End of Consult   Patient Position at End of Consult Supine;Bed/Chair alarm activated; All needs within reach     Shade Quiroz, PT, DPT

## 2022-11-11 NOTE — PLAN OF CARE
Problem: PHYSICAL THERAPY ADULT  Goal: Performs mobility at highest level of function for planned discharge setting  See evaluation for individualized goals  Description: Treatment/Interventions: LE strengthening/ROM, Functional transfer training, Endurance training, Patient/family training, Bed mobility, Cognitive reorientation, Therapeutic exercise          See flowsheet documentation for full assessment, interventions and recommendations  Outcome: Progressing  Note: Prognosis: Fair  Problem List: Decreased strength, Decreased endurance, Impaired balance, Decreased mobility, Decreased cognition, Decreased safety awareness  Assessment: Pt seen for PT treatment session this date  Therapy session focused on bed mobility and sitting tolerance/ balance in order to improve overall mobility and independence  Pt requires assist of 1-2 for all mobility performed this date  Pt found to have large BM upon arrival, rolling performed to assist in hygiene tasks- increased time required  Pt requiring increased A this date compared to previous session  Max Ax2 required to complete bed mobility tasks  Min- mod A required to stay seated EOB  Pt making slow progress toward goals  Pt was left supine in bed at the end of PT session with all needs in reach  Pt would benefit from continued PT services while in hospital to address remaining limitations  PT to continue to follow pt and recommends post-acute rehab services  The patient's AM-PAC Basic Mobility Inpatient Short Form Raw Score is 6  A Raw score of less than or equal to 16 suggests the patient may benefit from discharge to post-acute rehabilitation services  Please also refer to the recommendation of the Physical Therapist for safe discharge planning  Barriers to Discharge: Inaccessible home environment     PT Discharge Recommendation: Post acute rehabilitation services    See flowsheet documentation for full assessment

## 2022-11-11 NOTE — PLAN OF CARE
Problem: OCCUPATIONAL THERAPY ADULT  Goal: Performs self-care activities at highest level of function for planned discharge setting  See evaluation for individualized goals  Description: Treatment Interventions: ADL retraining, Functional transfer training, UE strengthening/ROM, Endurance training, Patient/family training, Cognitive reorientation, Equipment evaluation/education, Compensatory technique education, Continued evaluation, Energy conservation, Activityengagement          See flowsheet documentation for full assessment, interventions and recommendations  Outcome: Progressing  Note: Limitation: Decreased ADL status, Decreased endurance, Decreased cognition, Decreased self-care trans, Decreased high-level ADLs  Prognosis: Fair  Assessment: Patient participated in Skilled OT session this date with interventions consisting of ADL re training with the use of correct body mechnaics, Energy Conservation techniques, safety awareness and fall prevention techniques,  therapeutic activities to: increase activity tolerance, increase dynamic sit/ stand balance during functional activity  and increase OOB/ sitting tolerance   Upon arrival patient was found supine in bed  Pt demonstrated the following tasks: MOD A x 2 sup <> sit, MIN A to sit EOB, MAX A to roll  Pt performs bathing and dressing tasks with MAX A  Pt required hand over hand technique and increased time for all tasks  Patient continues to be functioning below baseline level, occupational performance remains limited secondary to factors listed above and increased risk for falls and injury  From OT standpoint, recommendation at time of d/c would be STR  Patient to benefit from continued Occupational Therapy treatment while in the hospital to address deficits as defined above and maximize level of functional independence with ADLs and functional mobility  Pt was left in bed with alarm on after session with all current needs met   The patient's raw score on the AM-PAC Daily Activity inpatient short form is 12, standardized score is 30 6, less than 39 4  Patients at this level are likely to benefit from discharge to post-acute rehabilitation services  Please refer to the recommendation of the Occupational Therapist for safe discharge planning       OT Discharge Recommendation: Post acute rehabilitation services

## 2022-11-11 NOTE — PLAN OF CARE
Problem: Potential for Falls  Goal: Patient will remain free of falls  Description: INTERVENTIONS:  - Educate patient/family on patient safety including physical limitations  - Instruct patient to call for assistance with activity   - Consult OT/PT to assist with strengthening/mobility   - Keep Call bell within reach  - Keep bed low and locked with side rails adjusted as appropriate  - Keep care items and personal belongings within reach  - Initiate and maintain comfort rounds  - Make Fall Risk Sign visible to staff  - Apply yellow socks and bracelet for high fall risk patients  - Consider moving patient to room near nurses station  Outcome: Progressing     Problem: PAIN - ADULT  Goal: Verbalizes/displays adequate comfort level or baseline comfort level  Description: Interventions:  - Encourage patient to monitor pain and request assistance  - Assess pain using appropriate pain scale  - Administer analgesics based on type and severity of pain and evaluate response  - Implement non-pharmacological measures as appropriate and evaluate response  - Consider cultural and social influences on pain and pain management  - Notify physician/advanced practitioner if interventions unsuccessful or patient reports new pain  Outcome: Progressing         Problem: MOBILITY - ADULT  Goal: Maintains/Returns to pre admission functional level  Description: INTERVENTIONS:  - Perform BMAT or MOVE assessment daily    - Set and communicate daily mobility goal to care team and patient/family/caregiver     - Collaborate with rehabilitation services on mobility goals if consulted  - Record patient progress and toleration of activity level   Outcome: Progressing

## 2022-11-11 NOTE — QUICK NOTE
Pt's INR this morning 1 40 (down from 1 62) yesterday am, however as per the EMR, pt refused the Coumadin 3mg that was ordered yesterday  Ordering 3mg of coumadin tonight

## 2022-11-11 NOTE — ASSESSMENT & PLAN NOTE
· Outpatient follow-up with Neurology  No inpatient intervention as per Neurology  Will follow-up with Dr Lupis Neal as outpatient  Patient known to him for prior stroke

## 2022-11-11 NOTE — ASSESSMENT & PLAN NOTE
2/2 poor po intake     IVF  Monitor BMP  KUB to r/o bowel obstruction/ileus  IV protonix   Calorie counts

## 2022-11-11 NOTE — CASE MANAGEMENT
Case Management Assessment & Discharge Planning Note    Patient name Marlyn Adams  Location [address] [place] [address] [facility] [id number]  : 1960 Date 2022       Current Admission Date: 11/3/2022  Current Admission Diagnosis:Atrial fibrillation [facility])   Patient Active Problem List    Diagnosis Date Noted   • Dehydration 2022   • Hyperthyroidism 11/10/2022   • Severe protein-calorie malnutrition ([facility] ) 2022   • Hyponatremia 10/14/2022   • SIADH (syndrome of inappropriate ADH production) ([facility] ) 10/14/2022   • Subclinical hyperthyroidism 10/14/2022   • Sinus arrhythmia 10/10/2022   • Mild protein-calorie malnutrition ([facility] ) 10/07/2022   • Medical clearance for incarceration 2022   • 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 attack 2022   • Atypical chest pain 2022   • Hypokalemia 2022   • Dizziness 2022   • Renal cyst 2022   • PVD (peripheral vascular disease) ([facility] ) 2022   • Ataxia 2022   • CVA (cerebral vascular accident) ([facility] ) 2022   • Right inguinal pain 2022   • Alkaline phosphatase elevation 2022   • Low back pain 2022   • S/P laparoscopic hernia repair 2021   • Non-recurrent bilateral inguinal hernia without obstruction or gangrene 2021   • Gastric polyps 2021   • Gastric AVM 2021   • Edema of both lower legs 2021   • Tremor of right hand 2021   • Weakness of both lower extremities 2021   • Anticoagulated on Coumadin 2021   • CORIE (obstructive sleep apnea)    • Hyperbilirubinemia 08/10/2020   • Antiphospholipid antibody with hypercoagulable state ([facility] ) 2020   • History of stroke 2020   • Other viral warts 2019   • Physical deconditioning 2019   • Class 2 obesity in adult 2018   • Bright red blood per rectum 11/10/2017   • Numbness 2017   • H/O aortic aneurysm repair 2017   • Hypertension 2017   • GERD (gastroesophageal reflux disease) 08/26/2017   • Hyperlipidemia 03/17/2017   • Peyronie disease 12/09/2016   • Vitamin D deficiency 06/15/2016   • Atrial fibrillation ([address] ) 11/17/2014   • SHIMON (generalized anxiety disorder) 11/11/2014   • Constipation 09/19/2014   • Irritable bowel syndrome 04/24/2014   • Kidney stones 04/24/2014      LOS (days): 8  Geometric Mean LOS (GMLOS) (days): 5 00  Days to GMLOS:-2 7     OBJECTIVE:  PATIENT READMITTED TO HOSPITAL  Risk of Unplanned Readmission Score: 33 6         Current admission status: Inpatient       Preferred Pharmacy:   [facility] [address] [facility] [address] [place] [address]  [address] [place] [address]  Phone: [phone] Fax: [phone]    Primary Care Provider: Eddie Dobbins MD    Primary Insurance: [facility]  Secondary Insurance:     ASSESSMENT:  [address] [place] [address]   Primary Phone: [phone] (Mobile)  Home Phone: [phone]                              Patient Information  Admitted from[de-identified] Home  Mental Status: Alert, Confused  During Assessment patient was accompanied by: Not accompanied during assessment  Assessment information provided by[de-identified] Sister Molly Craft)  Primary Caregiver: Self  Support Systems: Self, Family members (sister Cassy Barber)  What city do you live in[place]/ [place] entry access options   Select all that apply : Stairs  Number of steps to enter home : 6  Do the steps have railings?: Yes  Type of Current Residence: [address] [place] [address] home  Upon entering residence, is there a bedroom on the main floor (no further steps)?: No  A bedroom is located on the following floor levels of residence (select all that apply):: 2nd Floor  Upon entering residence, is there a bathroom on the main floor (no further steps)?: No  Indicate which floors of current residence have a bathroom (select all the apply):: 2nd Floor  Number of steps to 2nd floor from main floor: One Flight  In the last 12 months, was there a time when you were not able to pay the mortgage or rent on time?: No  In the last 12 months, how many places have you lived?: 1  In the last 12 months, was there a time when you did not have a steady place to sleep or slept in a shelter (including now)?: No  Living Arrangements: Lives Alone (sister Mary Jane Cornejo states that the patient lives alone, however she does stay with him when she has to in order to help and is willing to do so again upon time of discharge )    Activities of Daily Living Prior to Admission  Functional Status: Independent  Completes ADLs independently?: Yes  Ambulates independently?: Yes  Does patient use assisted devices?: Yes  Assisted Devices (DME) used: Rupa Mosiri  Does patient currently own DME?: Yes  What DME does the patient currently own?: Rupa Orellana  Does patient have a history of Outpatient Therapy (PT/OT)?: Yes  Does the patient have a history of Short-Term Rehab?: No  Does patient have a history of HHC?: No ([facility])  Does patient currently have [facility]?: No (Ladi Watson will not take patient back at this time - states he needs higher level of care)         Patient Information Continued  Income Source: SSI/SSD  Does patient have prescription coverage?: Yes  Within the past 12 months, you worried that your food would run out before you got the money to buy more : Never true  Within the past 12 months, the food you bought just didn't last and you didn't have money to get more : Never true  Does patient receive dialysis treatments?: No  Does patient have a history of substance abuse?: No  Does patient have a history of Mental Health Diagnosis?: Yes (anxiety)  Has patient received inpatient treatment related to mental health in the last 2 years?: Yes (sister states he was at [facility] behavioral health from Sept 28 2022 - Oct 26, 2022)         Means of Transportation  Means of Transport to [facility][de-identified] Drives Self  In the past 12 months, has lack of transportation kept you from medical appointments or from getting medications?: No  In the past 12 months, has lack of transportation kept you from meetings, work, or from getting things needed for daily living?: No        DISCHARGE DETAILS:    Discharge planning discussed with[de-identified] Irene Proctor (sister)  Freedom of Choice: Yes        Were Treatment Team discharge recommendations reviewed with patient/caregiver?: Yes  Did patient/caregiver verbalize understanding of patient care needs?: Yes  Were patient/caregiver advised of the risks associated with not following Treatment Team discharge recommendations?: Yes    Contacts  Patient Contacts: Gray Mercado (sister)  Relationship to Patient[de-identified] Family  Contact Method: Phone  Phone Number: [phone]  Reason/Outcome: Continuity of Care, Emergency Contact, Discharge Planning, Referral              Other Referral/Resources/Interventions Provided:  Referral Comments: Patient is being recommended for STR at this time  Spoke with Irene Proctor and her preference is for [facility] at this time  Treatment Team Recommendation: Short Term Rehab  Discharge Destination Plan[de-identified] Short Term Rehab                 Additional Comments: CM spoke with sister Irene Proctor  She confirms that prior to the patients mental health stay, the patient was completely alert and oriented x4  He was fully independent with dressing and bathing  He was not using any assistive devices and was driving  Sister states that the patient had his inpatient mental health stay and was discharged to home walking with the use of a RW  She was afraid that he was going to fall so she was watching him go up and down the stairs by bumping up and down on his butt  Sister is interested in the patient ultimately returning home, however she prefers that he goes to STR prior to returning home

## 2022-11-11 NOTE — PROGRESS NOTES
[facility]  Progress Note - Claudia Winters 1960, 58 y o  male MRN: [id number]  Unit/Bed#: [facility] 907-01 Encounter: [id number]  Primary Care Provider: Jason Henao MD   Date and time admitted to hospital: 11/3/2022  3:28 PM    Atrial fibrillation [facility])  Assessment & Plan  -on PTA carvedilol and warfarin  Plan  Will target INR between 2 5 in 3 5 for hypercoagulable state  Continue lovenox to warfarin bridge  Restart coreg 12 5 BID    Dehydration  Assessment & Plan  2/2 poor po intake     IVF  Monitor BMP Mg PO4  KUB to r/o bowel obstruction/ileus  IV protonix   Calorie counts    SHIMON (generalized anxiety disorder)  Assessment & Plan  -previous admission from the end of September until the end of October at [facility] in which he was hospitalized for over a month for severe anxiety and inability to care for self  -per sister reportedly was able to ambulate prior to the hospitalization but after the hospitalization patient needing assistance with ambulation at home  -during hospitalization there patient developed hyponatremia during his course of stay and Zoloft that had been started was stopped  -PRN lorazepam  -recently discharged from inpatient psychiatry with mirtazapine and aripiprazole, discussed resumption with the patient but he reports excess sedation with these  -d/w psychiatry started buspar 5mg BID, monitor for response  -patient now endorsing more feelings of depressed mood and feeling down  -d/w psych for additional reccs -> will start lexapro 5 mg and monitor sodium    Hyperthyroidism  Assessment & Plan  Per chart review patient has had low TSH and elevated free T4 in the past   Most recent labs during this admission with TSH 0 282 f t4 1 56  thyroid U/S during last admission at [facility] was unremarkable   Has seen Endo in the past during previous admission at [facility] (oct 22) and they recommend get repeat TFT, TSI and thyroid abs     Endo consulted, appreciate reccs  Repeat thyroid tests in 4-6 weeks   Follow up TSI    Severe protein-calorie malnutrition ([facility] )  Assessment & Plan  Malnutrition Findings:   Adult Malnutrition type: Chronic illness  Adult Degree of Malnutrition: Other severe protein calorie malnutrition  Malnutrition Characteristics: Inadequate energy, Weight loss      360 Statement: Severe Pro/cathryn malnutrition r/t inadequate intake/anxiety as evidenced by 28% unplanned weight loss since 5/3/22, consuming < 75% energy intake compared to estimated enrgy needs > 1 month  Treatment TBD s/p swallow eval    BMI Findings: Body mass index is 28 96 kg/m²  Calorie counts x 3 days    CVA (cerebral vascular accident) ([facility] )  Assessment & Plan  -Hx CVA x 3 in setting of antiphospholipid syndrome  -on PTA warfarin although noted supratherapeutic INR  -CT head in ER: No acute intracranial abnormality  Encephalomalacia involving the right occipital lobe and bilateral cerebelli  Grossly stable appearance of periventricular hypoattenuation compared to MRI brain 8/29/2022, likely representing chronic microvascular ischemic changes  PT/ ot consulted and recommending rehab    Tremor of right hand  Assessment & Plan  · Outpatient follow-up with Neurology  No inpatient intervention as per Neurology  Will follow-up with Dr Park Clayton as outpatient  Patient known to him for prior stroke  Antiphospholipid antibody with hypercoagulable state [facility] )  Assessment & Plan  -history of antiphospholipid antibody on PTA Coumadin  -had ischemic CVA despite being on Eliquis in the past  -restarting warfarin as noted above  -inr is subtherapeutic, will continue bridge with lovenox    Hypernatremia-resolved as of 11/5/2022  Assessment & Plan  -sodium 151, DAYAN, rhabdo on admission  Note Nephrology input  Slowly improving   -initial thought process was this was a hypovolemic hypernatremia  -CT Head: No acute intracranial abnormality    Encephalomalacia involving the right occipital lobe and bilateral cerebelli  Grossly stable appearance of periventricular hypoattenuation compared to MRI brain 2022, likely representing chronic microvascular ischemic changes  Plan  > as above in rhabdo section  VTE Pharmacologic Prophylaxis: VTE Score: 3 Moderate Risk (Score 3-4) - Pharmacological DVT Prophylaxis Ordered: enoxaparin (Lovenox)  Patient Centered Rounds: I performed bedside rounds with nursing staff today  Discussions with Specialists or Other Care Team Provider: Psych - need additional reccs for antidepressants    Education and Discussions with Family / Patient: will call sister today    Time Spent for Care: 30 minutes  More than 50% of total time spent on counseling and coordination of care as described above  Current Length of Stay: 8 day(s)  Current Patient Status: Inpatient   Certification Statement: The patient will continue to require additional inpatient hospital stay due to dehydration, poor po intake  Discharge Plan: Anticipate discharge in 48-72 hrs to rehab facility  Code Status: Level 3 - DNAR and DNI    Subjective:   Overnight patient refusing meds including warfarin, protonix, kdur, coreg, buspar  This more accepted meds  Ate 50% breakfast this morning  Seems week with physical therapy today  Had a lengthy conversation with patient today and he did endorse feeling depressed  Does state he has abdominal bloating and fullness  Tender abdomen on exam    Objective:     Vitals:   Temp (24hrs), Av 5 °F (36 9 °C), Min:98 1 °F (36 7 °C), Max:99 2 °F (37 3 °C)    Temp:  [98 1 °F (36 7 °C)-99 2 °F (37 3 °C)] 98 1 °F (36 7 °C)  HR:  [] 119  Resp:  [17-18] 17  BP: (124-155)/(71-88) 124/71  SpO2:  [94 %-96 %] 94 %  Body mass index is 28 96 kg/m²  Input and Output Summary (last 24 hours):      Intake/Output Summary (Last 24 hours) at 2022 1352  Last data filed at 2022 1257  Gross per 24 hour   Intake  25 ml   Output 80 ml   Net  25 ml Physical Exam:   Physical Exam  Vitals and nursing note reviewed  Constitutional:       Appearance: He is well-developed  He is ill-appearing  HENT:      Head: Normocephalic and atraumatic  Mouth/Throat:      Mouth: Mucous membranes are dry  Eyes:      Extraocular Movements: Extraocular movements intact  Conjunctiva/sclera: Conjunctivae normal       Pupils: Pupils are equal, round, and reactive to light  Cardiovascular:      Rate and Rhythm: Normal rate and regular rhythm  Heart sounds: No murmur heard  Pulmonary:      Effort: Pulmonary effort is normal  No respiratory distress  Breath sounds: Normal breath sounds  Abdominal:      General: There is distension  Palpations: Abdomen is soft  Tenderness: There is abdominal tenderness  Musculoskeletal:      Cervical back: Neck supple  Comments: 3/5 bilateral upper lower ext   Diffuse muscle weakness    Skin:     General: Skin is warm and dry  Neurological:      Mental Status: He is alert            Additional Data:     Labs:  Results from last 7 days   Lab Units 11/10/22  0528   WBC Thousand/uL 7 82   HEMOGLOBIN g/dL 11 5*   HEMATOCRIT % 35 5*   PLATELETS Thousands/uL 164   NEUTROS PCT % 79*   LYMPHS PCT % 13*   MONOS PCT % 7   EOS PCT % 0     Results from last 7 days   Lab Units 11/11/22  0527   SODIUM mmol/L 149*   POTASSIUM mmol/L 3 4*   CHLORIDE mmol/L 119*   CO2 mmol/L 27   BUN mg/dL 24   CREATININE mg/dL 1 10   ANION GAP mmol/L 3*   CALCIUM mg/dL 8 5   ALBUMIN g/dL 2 2*   TOTAL BILIRUBIN mg/dL 1 03*   ALK PHOS U/L 71   ALT U/L 52   AST U/L 37   GLUCOSE RANDOM mg/dL 126     Results from last 7 days   Lab Units 11/11/22  0527   INR  1 40*     Results from last 7 days   Lab Units 11/09/22  1648 11/09/22  1111 11/09/22  0621 11/08/22  1611 11/08/22  1041 11/08/22  0736 11/07/22  2104 11/07/22  1546 11/07/22  1104 11/06/22  2047 11/06/22  1603 11/06/22  1037   POC GLUCOSE mg/dl 100 95 83 90 119 101 106 102 117 91 94 106               Lines/Drains:  Invasive Devices  Report    Peripheral Intravenous Line  Duration           Peripheral IV 11/08/22 Right Antecubital 3 days                      Imaging: Reviewed radiology reports from this admission including: xray(s)    Recent Cultures (last 7 days):         Last 24 Hours Medication List:   Current Facility-Administered Medications   Medication Dose Route Frequency Provider Last Rate   • busPIRone  5 mg Oral BID Ray Shen MD     • carvedilol  12 5 mg Oral BID With Meals Tana Lott, DO     • docusate sodium  100 mg Oral BID Ray hSen MD     • enoxaparin  1 mg/kg Subcutaneous Q12H [address] Ray Shen MD     • escitalopram  5 mg Oral Daily Brandoit Kaylie, DO     • fluticasone  1 spray Each Nare Daily Tana Lott, DO     • folic acid  1 mg Oral Daily Ray Shen MD     • LORazepam  0 5 mg Oral Q8H PRN Ray Shen MD     • multivitamin stress formula  1 tablet Oral Daily Ray Shen MD     • pantoprazole  40 mg Intravenous Q24H [address] Tana Lott, DO     • potassium chloride  20 mEq Oral TID With Meals Ray Shen MD     • IV infusion builder   Intravenous Continuous Brandoit Kaylie,  mL/hr at 11/11/22 1156   • senna  2 tablet Oral Daily Ray Shen MD     • sodium chloride  1 spray Each Nare Q6H Tana Suhia, DO     • thiamine  100 mg Oral Daily Ray Shen MD     • warfarin  3 mg Oral Once (warfarin) Garrel Oar, DO     • warfarin  3 mg Oral Once (warfarin) Garrel Oar, DO          Today, Patient Was Seen By: Lizabeth Zuniga    **Please Note: This note may have been constructed using a voice recognition system  **

## 2022-11-11 NOTE — ASSESSMENT & PLAN NOTE
Per chart review patient has had low TSH and elevated free T4 in the past   Most recent labs during this admission with TSH 0 282 f t4 1 56  thyroid U/S during last admission at [address] [place] [address] was unremarkable   Has seen Endo in the past during previous admission at [address] [place] [address] (oct 22) and they recommend get repeat TFT, TSI and thyroid abs     Endo consulted, appreciate reccs  Repeat thyroid tests in 4-6 weeks

## 2022-11-12 PROBLEM — R14.0 ABDOMINAL DISTENSION: Status: ACTIVE | Noted: 2022-11-12

## 2022-11-12 LAB
ANION GAP SERPL CALCULATED.3IONS-SCNC: 1 MMOL/L (ref 4–13)
BUN SERPL-MCNC: 16 MG/DL (ref 5–25)
CALCIUM SERPL-MCNC: 8.4 MG/DL (ref 8.3–10.1)
CHLORIDE SERPL-SCNC: 117 MMOL/L (ref 96–108)
CO2 SERPL-SCNC: 27 MMOL/L (ref 21–32)
CREAT SERPL-MCNC: 0.96 MG/DL (ref 0.6–1.3)
GFR SERPL CREATININE-BSD FRML MDRD: 84 ML/MIN/1.73SQ M
GLUCOSE SERPL-MCNC: 129 MG/DL (ref 65–140)
INR PPP: 1.19 (ref 0.84–1.19)
MAGNESIUM SERPL-MCNC: 2.3 MG/DL (ref 1.6–2.6)
PHOSPHATE SERPL-MCNC: 2.8 MG/DL (ref 2.3–4.1)
POTASSIUM SERPL-SCNC: 3.7 MMOL/L (ref 3.5–5.3)
PROTHROMBIN TIME: 15.3 SECONDS (ref 11.6–14.5)
SODIUM SERPL-SCNC: 145 MMOL/L (ref 135–147)
THYROGLOB AB SERPL-ACNC: <1 IU/ML (ref 0–0.9)
THYROPEROXIDASE AB SERPL-ACNC: <8 IU/ML (ref 0–34)

## 2022-11-12 RX ORDER — WARFARIN SODIUM 2 MG/1
4 TABLET ORAL
Status: COMPLETED | OUTPATIENT
Start: 2022-11-12 | End: 2022-11-12

## 2022-11-12 RX ADMIN — PANTOPRAZOLE SODIUM 40 MG: 40 INJECTION, POWDER, FOR SOLUTION INTRAVENOUS at 08:28

## 2022-11-12 RX ADMIN — B-COMPLEX W/ C & FOLIC ACID TAB 1 TABLET: TAB at 08:28

## 2022-11-12 RX ADMIN — BUSPIRONE HYDROCHLORIDE 5 MG: 5 TABLET ORAL at 18:43

## 2022-11-12 RX ADMIN — BUSPIRONE HYDROCHLORIDE 5 MG: 5 TABLET ORAL at 08:28

## 2022-11-12 RX ADMIN — ENOXAPARIN SODIUM 90 MG: 100 INJECTION SUBCUTANEOUS at 08:27

## 2022-11-12 RX ADMIN — POTASSIUM CHLORIDE 20 MEQ: 1500 TABLET, EXTENDED RELEASE ORAL at 12:06

## 2022-11-12 RX ADMIN — THIAMINE HCL TAB 100 MG 100 MG: 100 TAB at 08:28

## 2022-11-12 RX ADMIN — ESCITALOPRAM OXALATE 5 MG: 5 TABLET, FILM COATED ORAL at 08:28

## 2022-11-12 RX ADMIN — CARVEDILOL 12.5 MG: 12.5 TABLET, FILM COATED ORAL at 08:28

## 2022-11-12 RX ADMIN — POTASSIUM CHLORIDE 20 MEQ: 1500 TABLET, EXTENDED RELEASE ORAL at 16:50

## 2022-11-12 RX ADMIN — POTASSIUM CHLORIDE 20 MEQ: 1500 TABLET, EXTENDED RELEASE ORAL at 08:29

## 2022-11-12 RX ADMIN — FOLIC ACID 1 MG: 1 TABLET ORAL at 08:28

## 2022-11-12 RX ADMIN — WARFARIN SODIUM 4 MG: 2 TABLET ORAL at 18:43

## 2022-11-12 NOTE — ASSESSMENT & PLAN NOTE
· Possibly the cause of his poor oral intake  · Still has some distension and tenderness today  · No signs of obstruction on CT abdomen done yesterday  · Will do a soap suds enema today and monitor for resolution

## 2022-11-12 NOTE — ASSESSMENT & PLAN NOTE
-previous admission from the end of September until the end of October at [facility] in which he was hospitalized for over a month for severe anxiety and inability to care for self  -per sister reportedly was able to ambulate prior to the hospitalization but after the hospitalization patient needing assistance with ambulation at home  -during hospitalization there patient developed hyponatremia during his course of stay and Zoloft that had been started was stopped  -PRN lorazepam  -recently discharged from inpatient psychiatry with mirtazapine and aripiprazole, discussed resumption with the patient but he reports excess sedation with these  -d/w psychiatry started buspar 5mg BID, and lexapro  -patient now endorsing more feelings of depressed mood and feeling down  -monitor for response with the above regimen

## 2022-11-12 NOTE — PROGRESS NOTES
[facility] [facility]  Progress Note - Sumeet Sagastume 1960, 58 y o  male MRN: [id number]  Unit/Bed#: [facility] 907-01 Encounter: [id number]  Primary Care Provider: Vincent Pino MD   Date and time admitted to hospital: 11/3/2022  3:28 PM    Abdominal distension  Assessment & Plan  · Possibly the cause of his poor oral intake  · Still has some distension and tenderness today  · No signs of obstruction on CT abdomen done yesterday  · Will do a soap suds enema today and monitor for resolution  Antiphospholipid antibody with hypercoagulable state ([facility] )  Assessment & Plan  -history of antiphospholipid antibody on PTA Coumadin  -had ischemic CVA despite being on Eliquis in the past  -restarting warfarin as noted above  -inr is subtherapeutic, will continue bridge with lovenox    Atrial fibrillation (HCC)  Assessment & Plan  -on PTA carvedilol and warfarin  Plan  Will target INR between 2 5 in 3 5 for hypercoagulable state  Continue lovenox to warfarin bridge  Continue coreg 12 5 BID    CVA (cerebral vascular accident) ([facility] )  Assessment & Plan  -Hx CVA x 3 in setting of antiphospholipid syndrome  -on PTA warfarin although noted supratherapeutic INR  -CT head in ER: No acute intracranial abnormality  Encephalomalacia involving the right occipital lobe and bilateral cerebelli  Grossly stable appearance of periventricular hypoattenuation compared to MRI brain 8/29/2022, likely representing chronic microvascular ischemic changes    PT/ ot consulted and recommending rehab    SHIMON (generalized anxiety disorder)  Assessment & Plan  -previous admission from the end of September until the end of October at [facility] in which he was hospitalized for over a month for severe anxiety and inability to care for self  -per sister reportedly was able to ambulate prior to the hospitalization but after the hospitalization patient needing assistance with ambulation at home  -during hospitalization there patient developed hyponatremia during his course of stay and Zoloft that had been started was stopped  -PRN lorazepam  -recently discharged from inpatient psychiatry with mirtazapine and aripiprazole, discussed resumption with the patient but he reports excess sedation with these  -d/w psychiatry started buspar 5mg BID, and lexapro  -patient now endorsing more feelings of depressed mood and feeling down  -monitor for response with the above regimen    Severe protein-calorie malnutrition ([address] )  Assessment & Plan  Malnutrition Findings:   Adult Malnutrition type: Chronic illness  Adult Degree of Malnutrition: Other severe protein calorie malnutrition  Malnutrition Characteristics: Inadequate energy, Weight loss                  360 Statement: Severe Pro/cathryn malnutrition r/t inadequate intake/anxiety as evidenced by 28% unplanned weight loss since 5/3/22, consuming < 75% energy intake compared to estimated enrgy needs > 1 month  Treatment TBD s/p swallow eval    BMI Findings: Body mass index is 28 96 kg/m²  Calorie counts x 3 days    Tremor of right hand  Assessment & Plan  · Outpatient follow-up with Neurology  No inpatient intervention as per Neurology  Will follow-up with Dr Claudia Mac as outpatient  Patient known to him for prior stroke      Dehydration  Assessment & Plan  Due to poor po intake, decreased appetite, possible constipation or fecal impaction  D/c IVF today  Monitor BMP  Encourage oral water intake    Hyperthyroidism  Assessment & Plan  Per chart review patient has had low TSH and elevated free T4 in the past   Most recent labs during this admission with TSH 0 282 f t4 1 56  thyroid U/S during last admission at [address] [place] [address] was unremarkable   Has seen Endo in the past during previous admission at [address] [place] [address] (oct 22) and they recommend get repeat TFT, TSI and thyroid abs     Endo consulted, appreciate reccs  Repeat thyroid tests in 4-6 weeks           VTE Pharmacologic Prophylaxis: VTE Score: 3 Moderate Risk (Score 3-4) - Pharmacological DVT Prophylaxis Ordered: enoxaparin (Lovenox)  Patient Centered Rounds: I performed bedside rounds with nursing staff today  Discussions with Specialists or Other Care Team Provider: nurse, JORGE    Education and Discussions with Family / Patient: Updated  (sister) at bedside  Time Spent for Care: 30 minutes  More than 50% of total time spent on counseling and coordination of care as described above  Current Length of Stay: 9 day(s)  Current Patient Status: Inpatient   Certification Statement: The patient will continue to require additional inpatient hospital stay due to poor po intake  Discharge Plan: Anticipate discharge in >72 hrs to rehab facility  Code Status: Level 3 - DNAR and DNI    Subjective:   Limited due to psychiatric condition  Repeated insists on removing his right AC IV access  Objective:     Vitals:   Temp (24hrs), Av °F (36 7 °C), Min:97 3 °F (36 3 °C), Max:98 8 °F (37 1 °C)    Temp:  [97 3 °F (36 3 °C)-98 8 °F (37 1 °C)] 97 3 °F (36 3 °C)  HR:  [91-92] 92  Resp:  [14-17] 17  BP: (148-160)/(87-99) 160/99  SpO2:  [95 %-96 %] 95 %  Body mass index is 28 96 kg/m²  Input and Output Summary (last 24 hours): Intake/Output Summary (Last 24 hours) at 2022 1212  Last data filed at 2022 0701  Gross per 24 hour   Intake 2208 33 ml   Output --   Net 2208 33 ml       Physical Exam:   Physical Exam  Constitutional:       Appearance: Normal appearance  HENT:      Head: Normocephalic and atraumatic  Nose: Nose normal       Mouth/Throat:      Mouth: Mucous membranes are moist       Pharynx: Oropharynx is clear  Eyes:      Extraocular Movements: Extraocular movements intact  Cardiovascular:      Rate and Rhythm: Normal rate and regular rhythm  Pulmonary:      Effort: Pulmonary effort is normal       Breath sounds: No wheezing or rales  Abdominal:      General: There is distension  Palpations: Abdomen is soft  Tenderness: There is abdominal tenderness  Musculoskeletal:      Right lower leg: No edema  Left lower leg: No edema  Skin:     General: Skin is warm and dry  Neurological:      Mental Status: He is alert  Mental status is at baseline     Psychiatric:      Comments: Depressed and anxious          Additional Data:     Labs:  Results from last 7 days   Lab Units 11/10/22  0528   WBC Thousand/uL 7 82   HEMOGLOBIN g/dL 11 5*   HEMATOCRIT % 35 5*   PLATELETS Thousands/uL 164   NEUTROS PCT % 79*   LYMPHS PCT % 13*   MONOS PCT % 7   EOS PCT % 0     Results from last 7 days   Lab Units 11/12/22  0439 11/11/22  0527   SODIUM mmol/L 145 149*   POTASSIUM mmol/L 3 7 3 4*   CHLORIDE mmol/L 117* 119*   CO2 mmol/L 27 27   BUN mg/dL 16 24   CREATININE mg/dL 0 96 1 10   ANION GAP mmol/L 1* 3*   CALCIUM mg/dL 8 4 8 5   ALBUMIN g/dL  --  2 2*   TOTAL BILIRUBIN mg/dL  --  1 03*   ALK PHOS U/L  --  71   ALT U/L  --  52   AST U/L  --  37   GLUCOSE RANDOM mg/dL 129 126     Results from last 7 days   Lab Units 11/12/22  0439   INR  1 19     Results from last 7 days   Lab Units 11/09/22  1648 11/09/22  1111 11/09/22  0621 11/08/22  1611 11/08/22  1041 11/08/22  0736 11/07/22  2104 11/07/22  1546 11/07/22  1104 11/06/22  2047 11/06/22  1603 11/06/22  1037   POC GLUCOSE mg/dl 100 95 83 90 119 101 106 102 117 91 94 106               Lines/Drains:  Invasive Devices  Report    Peripheral Intravenous Line  Duration           Peripheral IV 11/11/22 Distal;Right;Ventral (anterior) Wrist <1 day                      Imaging: Reviewed radiology reports from this admission including: abdominal/pelvic CT and Personally reviewed the following imaging: abdominal/pelvic CT    Recent Cultures (last 7 days):         Last 24 Hours Medication List:   Current Facility-Administered Medications   Medication Dose Route Frequency Provider Last Rate   • busPIRone  5 mg Oral BID Casey Johnston MD     • carvedilol  12 5 mg Oral BID With Meals Wanda Joseph DO     • enoxaparin  1 mg/kg Subcutaneous Q12H Hemal Ford MD     • escitalopram  5 mg Oral Daily Jasmit Kaylie, DO     • fluticasone  1 spray Each Nare Daily Brandoit Kaylie, DO     • folic acid  1 mg Oral Daily Alex Pandya MD     • LORazepam  0 5 mg Oral Q8H PRN Alex Pandya MD     • multivitamin stress formula  1 tablet Oral Daily Alex Pandya MD     • pantoprazole  40 mg Intravenous Q24H [address] Jasmit Kaylie, DO     • potassium chloride  20 mEq Oral TID With Meals Alex Pandya MD     • IV infusion builder   Intravenous Continuous Brandoit Kaylie,  mL/hr at 11/11/22 2146   • sodium chloride  1 spray Each Nare Q6H Brandoit Kaylie, DO     • thiamine  100 mg Oral Daily Alex Pandya MD     • warfarin  4 mg Oral Once (warfarin) Aubree Anderson DO          Today, Patient Was Seen By: Shayy Munguia    **Please Note: This note may have been constructed using a voice recognition system  **

## 2022-11-12 NOTE — QUICK NOTE
"Sheryl Mratines is a 64 yo female with medical history of asthma, COPD, CAD, h/o T2DM, hypertension, CVA 2012 with R sided deficits, HFpEF, severe debility, and lap converted to open appendectomy 8/2018 c/b C diff, FTT, and a round infection presents to the ED for acute on chronic increased generalized abdominal pain with associated NBNB emesis, nausea and decreased appetite.  Abd pain is normally 5/10 but today is 7/10.  Pain is described as a"ache" and is non-radiating, non-exertional and unrelated to PO intake. These symptoms are waxing and waning since 8/2018 and usually takes zofran for sxs.  Pt states she has had decreased appetite over the past few weeks, intermittently able to keep down solid food, but usually only able to eat things like chicken broth.    She came to ED today since she was weak described as "feeling bad".  Patient states last bowel movement was prior to admission, no diarrhea.  Patient's family unsure if patient has any recent fevers, but patient denies any f/c/night sweats.  Patient denies any vision changes, dizziness, LH, changes in urine output, dysuria, chest pain or shortness of breath.      In ED, HR elevated to 90-110s, BP stable, 100% on RA and afebrile. No leukocytosis but CRP 78 (ESR 17). T bili 1.7 (BL .7 to 1.1).  Alk phos 400s (BL).  Lipase normal, liver enzymes unremarkable. Ua shows 3 hyaline casts, 2+ protein, 1+ ketones, 1+ bilirubin.  Given reglan, zofran, protonix and duonebs in the ED for sxs.  CT A/P with contrast shows "Posterior right hemipelvis rim enhancing fluid collection, suspicious for abscess.  Possible associated retained appendicolith.  Adjacent urinary bladder wall thickening, suspicious for superimposed cystitis."  Gen surgery consulted and recs given. CT shows stability in size compared to CT on 9/30/18 and actually has decreased in size.  No emergent intervention recommended.   " Patient's INR 1 19 this morning  Likely a reflection of him missing the coumadin dose 2 nights ago  Will order his home dose of coumadin 4mg for tonight

## 2022-11-12 NOTE — ASSESSMENT & PLAN NOTE
-on PTA carvedilol and warfarin  Plan  Will target INR between 2 5 in 3 5 for hypercoagulable state  Continue lovenox to warfarin bridge  Continue coreg 12 5 BID

## 2022-11-12 NOTE — ASSESSMENT & PLAN NOTE
Due to poor po intake, decreased appetite, possible constipation or fecal impaction  D/c IVF today  Monitor BMP  Encourage oral water intake

## 2022-11-12 NOTE — ASSESSMENT & PLAN NOTE
Per chart review patient has had low TSH and elevated free T4 in the past   Most recent labs during this admission with TSH 0 282 f t4 1 56  thyroid U/S during last admission at [address] [place] [address] was unremarkable   Has seen Endo in the past during previous admission at [address] [place] [address] (oct 22) and they recommend get repeat TFT, TSI and thyroid abs     Endo consulted, appreciate reccs  Repeat thyroid tests in 4-6 weeks

## 2022-11-12 NOTE — PLAN OF CARE
Problem: Potential for Falls  Goal: Patient will remain free of falls  Description: INTERVENTIONS:  - Educate patient/family on patient safety including physical limitations  - Instruct patient to call for assistance with activity   - Consult OT/PT to assist with strengthening/mobility   - Keep Call bell within reach  - Keep bed low and locked with side rails adjusted as appropriate  - Keep care items and personal belongings within reach  - Initiate and maintain comfort rounds  - Make Fall Risk Sign visible to staff  Problem: SAFETY ADULT  Goal: Patient will remain free of falls  Description: INTERVENTIONS:  - Educate patient/family on patient safety including physical limitations  - Instruct patient to call for assistance with activity   - Consult OT/PT to assist with strengthening/mobility   - Keep Call bell within reach  - Keep bed low and locked with side rails adjusted as appropriate  - Keep care items and personal belongings within reach  - Initiate and maintain comfort rounds  - Make Fall Risk Sign visible to staff  - Apply yellow socks and bracelet for high fall risk patients  - Consider moving patient to room near nurses station  Outcome: Progressing  Goal: Maintain or return to baseline ADL function  Description: INTERVENTIONS:  -  Assess patient's ability to carry out ADLs; assess patient's baseline for ADL function and identify physical deficits which impact ability to perform ADLs (bathing, care of mouth/teeth, toileting, grooming, dressing, etc )  - Assess/evaluate cause of self-care deficits   - Assess range of motion  - Assess patient's mobility; develop plan if impaired  - Assess patient's need for assistive devices and provide as appropriate  - Encourage maximum independence but intervene and supervise when necessary  - Involve family in performance of ADLs  - Assess for home care needs following discharge   - Consider OT consult to assist with ADL evaluation and planning for discharge  - Provide patient education as appropriate  Outcome: Progressing  Goal: Maintains/Returns to pre admission functional level  Description: INTERVENTIONS:  - Perform BMAT or MOVE assessment daily    - Set and communicate daily mobility goal to care team and patient/family/caregiver     - Collaborate with rehabilitation services on mobility goals if consulted  - Out of bed for toileting  - Record patient progress and toleration of activity level   Outcome: Progressing     - Apply yellow socks and bracelet for high fall risk patients  - Consider moving patient to room near nurses station  Outcome: Progressing

## 2022-11-12 NOTE — SPEECH THERAPY NOTE
Speech Language/Pathology    Speech/Language Pathology Progress Note    Patient Name: Teagan Rasmussen  PHCBL'Y Date: 11/12/2022    Subjective:  "That wasn't a breakfast"  Per RN patient with limited intake  Patient admits he does not like the current diet texture  Patient was NPO initially this morning due to concerns for bowel obstruction however now cleared  Objective:  Patient seen upright in bed  He is awake and able to participate in simple conversation as well as follow simple commands  He was able to self- feed with set up assistance and occasional hand over hand assistance which improved as the meal progressed  The patient consumed a cheeseburger with lettuce and tomato on it as well as thin liquids via straw sips  Patient able to bite/break cheeseburger without difficulty  Mastication was prolonged but adequate with the materials administered today  Bolus formation was adequate however patient was unable to transfer without liquid wash  He was noted to just continue to masticate the bolus  He did not make any attempts at any additional bolus presentations  Once transfer was initiated swallow initiation was prompt and laryngeal rise was adequate  No coughing, throat clearing, change in vocal quality or respiratory status noted today  By the end of my visit the patient was self feeding with verbal cues for alternating use of liquid wash  Education was provided to the RN today on results of my visit and recommendations  Reciprocal comprehension was verbally expressed  Assessment:  Patient presents with mild oral dysphagia with difficulty with transferring bolus benefiting from liquid wash with each bite  Aspiration risk appears low  Although not formally assessed  He continues with occasional decreased attention and perseverations but with encouragement is able to participate in functional conversation  Per prior notes from previous CVA no significant aphasia   He was seen in 2021 and swallow was GWFL  Speech/langauge GWFL at that time as well  He is able to follow very simple commands today however if he is asked multiple steps or too much he has difficulty with information processing vs receptive language       Plan/Recommendations:  Consider upgrade to regular/thin ( encourage finger foods for improved self feeding)  Provide a drink after each bite    meds as tolerated    Will f/u    MAGDALENE Perry S , [address] [facility]  Speech Language Pathologist   Available via [address] [place] [address] #[id number]  [place] #[id number]

## 2022-11-13 LAB
ANION GAP SERPL CALCULATED.3IONS-SCNC: 2 MMOL/L (ref 4–13)
BUN SERPL-MCNC: 13 MG/DL (ref 5–25)
CALCIUM SERPL-MCNC: 8.8 MG/DL (ref 8.3–10.1)
CHLORIDE SERPL-SCNC: 114 MMOL/L (ref 96–108)
CO2 SERPL-SCNC: 25 MMOL/L (ref 21–32)
CREAT SERPL-MCNC: 0.8 MG/DL (ref 0.6–1.3)
ERYTHROCYTE [DISTWIDTH] IN BLOOD BY AUTOMATED COUNT: 15.1 % (ref 11.6–15.1)
GFR SERPL CREATININE-BSD FRML MDRD: 95 ML/MIN/1.73SQ M
GLUCOSE SERPL-MCNC: 117 MG/DL (ref 65–140)
HCT VFR BLD AUTO: 37 % (ref 36.5–49.3)
HGB BLD-MCNC: 12.1 G/DL (ref 12–17)
MCH RBC QN AUTO: 31.3 PG (ref 26.8–34.3)
MCHC RBC AUTO-ENTMCNC: 32.7 G/DL (ref 31.4–37.4)
MCV RBC AUTO: 96 FL (ref 82–98)
PLATELET # BLD AUTO: 172 THOUSANDS/UL (ref 149–390)
PMV BLD AUTO: 12.2 FL (ref 8.9–12.7)
POTASSIUM SERPL-SCNC: 4 MMOL/L (ref 3.5–5.3)
RBC # BLD AUTO: 3.87 MILLION/UL (ref 3.88–5.62)
SODIUM SERPL-SCNC: 141 MMOL/L (ref 135–147)
TSI SER-ACNC: <0.1 IU/L (ref 0–0.55)
VIT B12 SERPL-MCNC: 393 PG/ML (ref 100–900)
WBC # BLD AUTO: 9.78 THOUSAND/UL (ref 4.31–10.16)

## 2022-11-13 RX ORDER — PANTOPRAZOLE SODIUM 40 MG/1
40 TABLET, DELAYED RELEASE ORAL
Status: DISCONTINUED | OUTPATIENT
Start: 2022-11-14 | End: 2022-12-06

## 2022-11-13 RX ADMIN — SALINE NASAL SPRAY 1 SPRAY: 1.5 SOLUTION NASAL at 23:17

## 2022-11-13 RX ADMIN — THIAMINE HCL TAB 100 MG 100 MG: 100 TAB at 10:46

## 2022-11-13 RX ADMIN — ENOXAPARIN SODIUM 90 MG: 100 INJECTION SUBCUTANEOUS at 23:16

## 2022-11-13 RX ADMIN — CARVEDILOL 12.5 MG: 12.5 TABLET, FILM COATED ORAL at 10:46

## 2022-11-13 RX ADMIN — FOLIC ACID 1 MG: 1 TABLET ORAL at 10:46

## 2022-11-13 RX ADMIN — POTASSIUM CHLORIDE 20 MEQ: 1500 TABLET, EXTENDED RELEASE ORAL at 13:27

## 2022-11-13 RX ADMIN — CARVEDILOL 12.5 MG: 12.5 TABLET, FILM COATED ORAL at 18:26

## 2022-11-13 RX ADMIN — ESCITALOPRAM OXALATE 5 MG: 5 TABLET, FILM COATED ORAL at 10:46

## 2022-11-13 RX ADMIN — POTASSIUM CHLORIDE 20 MEQ: 1500 TABLET, EXTENDED RELEASE ORAL at 18:26

## 2022-11-13 RX ADMIN — POTASSIUM CHLORIDE 20 MEQ: 1500 TABLET, EXTENDED RELEASE ORAL at 10:46

## 2022-11-13 RX ADMIN — B-COMPLEX W/ C & FOLIC ACID TAB 1 TABLET: TAB at 10:46

## 2022-11-13 RX ADMIN — PANTOPRAZOLE SODIUM 40 MG: 40 INJECTION, POWDER, FOR SOLUTION INTRAVENOUS at 10:46

## 2022-11-13 RX ADMIN — ENOXAPARIN SODIUM 90 MG: 100 INJECTION SUBCUTANEOUS at 10:46

## 2022-11-13 RX ADMIN — SALINE NASAL SPRAY 1 SPRAY: 1.5 SOLUTION NASAL at 18:29

## 2022-11-13 RX ADMIN — BUSPIRONE HYDROCHLORIDE 5 MG: 5 TABLET ORAL at 10:46

## 2022-11-13 RX ADMIN — BUSPIRONE HYDROCHLORIDE 5 MG: 5 TABLET ORAL at 18:26

## 2022-11-13 NOTE — ASSESSMENT & PLAN NOTE
Per chart review patient has had low TSH and elevated free T4 in the past   Most recent labs during this admission with TSH 0 282 f t4 1 56  thyroid U/S during last admission at [address] [place] [address] was unremarkable   Has seen Endo in the past during previous admission at [address] [place] [address] (oct 22) and they recommend get repeat TFT, TSI and thyroid abs     Endo consulted, appreciate reccs  Repeat thyroid tests in 4-6 weeks

## 2022-11-13 NOTE — PROGRESS NOTES
[facility]  Progress Note - David Dixon 1960, 58 y o  male MRN: [id number]  Unit/Bed#: [facility] 907-01 Encounter: [id number]  Primary Care Provider: Tanika Peacock MD   Date and time admitted to hospital: 11/3/2022  3:28 PM    Abdominal distension  Assessment & Plan  · Possibly the cause of his poor oral intake  · Reports improved distention and had a large BM recently  · No signs of obstruction on CT abdomen  · Regular diet, monitor for tolerance    Antiphospholipid antibody with hypercoagulable state ([facility] )  Assessment & Plan  -history of antiphospholipid antibody on PTA Coumadin  -had ischemic CVA despite being on Eliquis in the past  -restarting warfarin as noted above  -inr is subtherapeutic, will continue bridge with lovenox  -pt has been refusing oral medications, see discussion below    Atrial fibrillation ([facility] )  Assessment & Plan  -on PTA carvedilol and warfarin  Plan  Will target INR between 2 5 in 3 5 for hypercoagulable state  Continue lovenox to warfarin bridge  Continue coreg 12 5 BID    CVA (cerebral vascular accident) ([facility] )  Assessment & Plan  -Hx CVA x 3 in setting of antiphospholipid syndrome  -on PTA warfarin although noted supratherapeutic INR  -CT head in ER: No acute intracranial abnormality  Encephalomalacia involving the right occipital lobe and bilateral cerebelli  Grossly stable appearance of periventricular hypoattenuation compared to MRI brain 8/29/2022, likely representing chronic microvascular ischemic changes    PT/ ot consulted and recommending rehab    SHIMON (generalized anxiety disorder)  Assessment & Plan  · Previous admission from the end of September until the end of October at [address] [place] [address] in which he was hospitalized for over a month for severe anxiety and inability to care for self  · Per sister reportedly was able to ambulate prior to the hospitalization but after the hospitalization patient needing assistance with ambulation at home  · During hospitalization there patient developed hyponatremia during his course of stay and Zoloft that had been started was stopped due to hyponatremia  · PRN lorazepam ordered but he has been refusing  · Recently discharged from inpatient psychiatry with mirtazapine and aripiprazole, discussed resumption of thesewith the patient but he reports excess sedation with these  · D/w psychiatry earlier this week started buspar 5mg BID, and lexapro  · He does admit to depressive symptoms and anxiety, discussing certain topics with him result is worsening tremor  · On history gathering today when asked about how his lack of appetite and poor compliance with medications he is having severe difficulty with communicating his feeling and thoughts regarding his current state of mental and physical health  · Responses are absent or very delayed  Most responses to questions questions his thought, feelings, food and medication refusal result in an irrelevant or non nonsensical answer  · I suspect the severity of his anxiety and possible depression are impeding his communication abilities to seek medical help, he may not have medical decision making capacity at this time  · Will consult with neuropsychiatry       Severe protein-calorie malnutrition ([facility] )  Assessment & Plan  Malnutrition Findings:   Adult Malnutrition type: Chronic illness  Adult Degree of Malnutrition: Other severe protein calorie malnutrition  Malnutrition Characteristics: Inadequate energy, Weight loss                  360 Statement: Severe Pro/cathryn malnutrition r/t inadequate intake/anxiety as evidenced by 28% unplanned weight loss since 5/3/22, consuming < 75% energy intake compared to estimated enrgy needs > 1 month  Treatment TBD s/p swallow eval    BMI Findings: Body mass index is 28 92 kg/m²  Calorie counts x 3 days    Tremor of right hand  Assessment & Plan  · Outpatient follow-up with Neurology    No inpatient intervention as per Neurology  Will follow-up with Dr Meme Burrows as outpatient  Patient known to him for prior stroke  Dehydration  Assessment & Plan  Due to poor po intake, decreased appetite, possible constipation or fecal impaction  Improved now  Monitor off IV hydration  Monitor PO intake    Hyperthyroidism  Assessment & Plan  Per chart review patient has had low TSH and elevated free T4 in the past   Most recent labs during this admission with TSH 0 282 f t4 1 56  thyroid U/S during last admission at [facility] was unremarkable   Has seen Endo in the past during previous admission at [facility] (oct 22) and they recommend get repeat TFT, TSI and thyroid abs     Endo consulted, appreciate reccs  Repeat thyroid tests in 4-6 weeks           VTE Pharmacologic Prophylaxis: VTE Score: 3 Moderate Risk (Score 3-4) - Pharmacological DVT Prophylaxis Ordered: enoxaparin (Lovenox)  Patient Centered Rounds: I performed bedside rounds with nursing staff today  Discussions with Specialists or Other Care Team Provider: nurse, neuropsychology    Education and Discussions with Family / Patient: Updated  (sister) via phone  Time Spent for Care: 60 minutes  More than 50% of total time spent on counseling and coordination of care as described above  Current Length of Stay: 10 day(s)  Current Patient Status: Inpatient   Certification Statement: The patient will continue to require additional inpatient hospital stay due to poor PO intake  Discharge Plan: Anticipate discharge in >72 hrs to rehab facility  Code Status: Level 3 - DNAR and DNI    Subjective:   Limited history gathering due to psychiatric condition  Objective:     Vitals:   Temp (24hrs), Av 5 °F (36 4 °C), Min:97 3 °F (36 3 °C), Max:97 7 °F (36 5 °C)    Temp:  [97 3 °F (36 3 °C)-97 7 °F (36 5 °C)] 97 7 °F (36 5 °C)  Resp:  [17] 17  BP: (105-142)/(72-90) 140/90  SpO2:  [96 %] 96 %  Body mass index is 28 92 kg/m²       Input and Output Summary (last 24 hours):   No intake or output data in the 24 hours ending 11/13/22 1021    Physical Exam:   Physical Exam  Constitutional:       Appearance: Normal appearance  HENT:      Head: Normocephalic and atraumatic  Mouth/Throat:      Mouth: Mucous membranes are moist       Pharynx: Oropharynx is clear  Eyes:      Extraocular Movements: Extraocular movements intact  Cardiovascular:      Rate and Rhythm: Normal rate and regular rhythm  Pulmonary:      Effort: Pulmonary effort is normal       Breath sounds: No wheezing or rales  Abdominal:      General: There is distension  Palpations: Abdomen is soft  Tenderness: There is no abdominal tenderness  Musculoskeletal:      Right lower leg: No edema  Left lower leg: No edema  Skin:     General: Skin is warm and dry  Neurological:      Mental Status: He is alert  Mental status is at baseline  Comments: LUE tremor   Psychiatric:      Comments: Flat affect, delayed nonsensical responses          Additional Data:     Labs:  Results from last 7 days   Lab Units 11/13/22  0511 11/10/22  0528   WBC Thousand/uL 9 78 7 82   HEMOGLOBIN g/dL 12 1 11 5*   HEMATOCRIT % 37 0 35 5*   PLATELETS Thousands/uL 172 164   NEUTROS PCT %  --  79*   LYMPHS PCT %  --  13*   MONOS PCT %  --  7   EOS PCT %  --  0     Results from last 7 days   Lab Units 11/13/22  0511 11/12/22  0439 11/11/22  0527   SODIUM mmol/L 141   < > 149*   POTASSIUM mmol/L 4 0   < > 3 4*   CHLORIDE mmol/L 114*   < > 119*   CO2 mmol/L 25   < > 27   BUN mg/dL 13   < > 24   CREATININE mg/dL 0 80   < > 1 10   ANION GAP mmol/L 2*   < > 3*   CALCIUM mg/dL 8 8   < > 8 5   ALBUMIN g/dL  --   --  2 2*   TOTAL BILIRUBIN mg/dL  --   --  1 03*   ALK PHOS U/L  --   --  71   ALT U/L  --   --  52   AST U/L  --   --  37   GLUCOSE RANDOM mg/dL 117   < > 126    < > = values in this interval not displayed       Results from last 7 days   Lab Units 11/12/22  0439   INR  1 19     Results from last 7 days   Lab Units 11/09/22  1648 11/09/22  1111 11/09/22  0621 11/08/22  1611 11/08/22  1041 11/08/22  0736 11/07/22  2104 11/07/22  1546 11/07/22  1104 11/06/22  2047 11/06/22  1603 11/06/22  1037   POC GLUCOSE mg/dl 100 95 83 90 119 101 106 102 117 91 94 106               Lines/Drains:  Invasive Devices  Report    Peripheral Intravenous Line  Duration           Peripheral IV 11/13/22 Dorsal (posterior); Left Forearm <1 day                      Imaging: No pertinent imaging reviewed  Recent Cultures (last 7 days):         Last 24 Hours Medication List:   Current Facility-Administered Medications   Medication Dose Route Frequency Provider Last Rate   • busPIRone  5 mg Oral BID Tito Matta MD     • carvedilol  12 5 mg Oral BID With Meals Tana Lott, DO     • enoxaparin  1 mg/kg Subcutaneous Q12H [address] Tito Matta MD     • escitalopram  5 mg Oral Daily Tana Lott, DO     • fluticasone  1 spray Each Nare Daily Tana Lott, DO     • folic acid  1 mg Oral Daily Tito Matta MD     • LORazepam  0 5 mg Oral Q8H PRN Tito Matta MD     • multivitamin stress formula  1 tablet Oral Daily Tito Matta MD     • pantoprazole  40 mg Intravenous Q24H [address] Tana Lott, DO     • potassium chloride  20 mEq Oral TID With Meals Tito Matta MD     • sodium chloride  1 spray Each Nare Q6H Tana Lott DO     • thiamine  100 mg Oral Daily Tito Matta MD          Today, Patient Was Seen By: Khari Moseley    **Please Note: This note may have been constructed using a voice recognition system  **

## 2022-11-13 NOTE — ASSESSMENT & PLAN NOTE
· Outpatient follow-up with Neurology  No inpatient intervention as per Neurology  Will follow-up with Dr Saran Sewell as outpatient  Patient known to him for prior stroke

## 2022-11-13 NOTE — SPEECH THERAPY NOTE
Speech Language/Pathology    Speech/Language Pathology Progress Note    Patient Name: Raissa Lucero  [id number] Date: 11/13/2022    Subjective:  "its me against the world"  RN reported he is tolerating meds in puree without difficulty  Per PCA patient took banana for breakfast but refused [place] toast     Objective:  Patient seen upright for lunch  He consumed pasta and meatballs with broccoli and thin liquids  Patient was able to self feed with max verbal cueing for step by step sequencing and occasional hand over hand assistance  Patient was able to bite/break all po without difficulty  He continues to require cues to remove fork from his mouth ( again difficulty with sequencing of mealtime routine)  When food is presented to his mouth he is noted to have increasing rate of breath ( anxiety?)  When asked about this he states "its just too much"  This is noted across all consistencies ( puree, soft and hard solids) and is noted to be less with smaller bites and when the bolus is presented by the patient  Mastication continues to be prolonged but adequate  Patient continues with reduced transfer benefiting from liquid wash but improved today with occasional independent transfer without need for liquid wash  No sxs of aspiration noted  Assessment:  Swallow function continues to be unchanged from yesterday       Plan/Recommendations:  Continue current diet/POC    Will f/u    MAGDALENE Perry , CCC-SLP  Speech Language Pathologist   Available via [address] [place] [address] #[id number]  [place] #[id number]

## 2022-11-13 NOTE — ASSESSMENT & PLAN NOTE
· Possibly the cause of his poor oral intake     · Reports improved distention and had a large BM recently  · No signs of obstruction on CT abdomen  · Regular diet, monitor for tolerance

## 2022-11-13 NOTE — ASSESSMENT & PLAN NOTE
· Previous admission from the end of September until the end of October at [facility] in which he was hospitalized for over a month for severe anxiety and inability to care for self  · Per sister reportedly was able to ambulate prior to the hospitalization but after the hospitalization patient needing assistance with ambulation at home  · During hospitalization there patient developed hyponatremia during his course of stay and Zoloft that had been started was stopped due to hyponatremia  · PRN lorazepam ordered but he has been refusing  · Recently discharged from inpatient psychiatry with mirtazapine and aripiprazole, discussed resumption of thesewith the patient but he reports excess sedation with these  · D/w psychiatry earlier this week started buspar 5mg BID, and lexapro  · He does admit to depressive symptoms and anxiety, discussing certain topics with him result is worsening tremor  · On history gathering today when asked about how his lack of appetite and poor compliance with medications he is having severe difficulty with communicating his feeling and thoughts regarding his current state of mental and physical health  · Responses are absent or very delayed  Most responses to questions questions his thought, feelings, food and medication refusal result in an irrelevant or non nonsensical answer  · I suspect the severity of his anxiety and possible depression are impeding his communication abilities to seek medical help, he may not have medical decision making capacity at this time    · Will consult with neuropsychiatry

## 2022-11-13 NOTE — ASSESSMENT & PLAN NOTE
Due to poor po intake, decreased appetite, possible constipation or fecal impaction  Improved now  Monitor off IV hydration  Monitor PO intake

## 2022-11-13 NOTE — ASSESSMENT & PLAN NOTE
Malnutrition Findings:   Adult Malnutrition type: Chronic illness  Adult Degree of Malnutrition: Other severe protein calorie malnutrition  Malnutrition Characteristics: Inadequate energy, Weight loss                  360 Statement: Severe Pro/cathryn malnutrition r/t inadequate intake/anxiety as evidenced by 28% unplanned weight loss since 5/3/22, consuming < 75% energy intake compared to estimated enrgy needs > 1 month  Treatment TBD s/p swallow eval    BMI Findings: Body mass index is 28 92 kg/m²       Calorie counts x 3 days

## 2022-11-13 NOTE — PROGRESS NOTES
Pt refusing all medications including Coumadin and soaps suds enema for constipation  Also refusing to have condom cath placed due to gross incontinence  Placed urinal at bedside and have increased rounding to offer toileting and help use the urinal or bedpan  Patient has declined all offers and continues to have urinary incontinence  Educated patient extensively on the importance of taking Coumadin and the risks of noncompliance  Also educated on incontinence and how it can make skin breakdown worse  Pt  Does not display any evidence of learning  Reached out to primary care team to make them aware

## 2022-11-13 NOTE — ASSESSMENT & PLAN NOTE
-history of antiphospholipid antibody on PTA Coumadin  -had ischemic CVA despite being on Eliquis in the past  -restarting warfarin as noted above  -inr is subtherapeutic, will continue bridge with lovenox  -pt has been refusing oral medications, see discussion below

## 2022-11-13 NOTE — PLAN OF CARE
Problem: Potential for Falls  Goal: Patient will remain free of falls  Description: INTERVENTIONS:  - Educate patient/family on patient safety including physical limitations  - Instruct patient to call for assistance with activity   - Consult OT/PT to assist with strengthening/mobility   - Keep Call bell within reach  - Keep bed low and locked with side rails adjusted as appropriate  - Keep care items and personal belongings within reach  - Initiate and maintain comfort rounds  - Make Fall Risk Sign visible to staff  - Apply yellow socks and bracelet for high fall risk patients  - Consider moving patient to room near nurses station  Outcome: Progressing     Problem: SAFETY ADULT  Goal: Patient will remain free of falls  Description: INTERVENTIONS:  - Educate patient/family on patient safety including physical limitations  - Instruct patient to call for assistance with activity   - Consult OT/PT to assist with strengthening/mobility   - Keep Call bell within reach  - Keep bed low and locked with side rails adjusted as appropriate  - Keep care items and personal belongings within reach  - Initiate and maintain comfort rounds  - Make Fall Risk Sign visible to staff  - Apply yellow socks and bracelet for high fall risk patients  - Consider moving patient to room near nurses station  Outcome: Progressing  Goal: Maintain or return to baseline ADL function  Description: INTERVENTIONS:  -  Assess patient's ability to carry out ADLs; assess patient's baseline for ADL function and identify physical deficits which impact ability to perform ADLs (bathing, care of mouth/teeth, toileting, grooming, dressing, etc )  - Assess/evaluate cause of self-care deficits   - Assess range of motion  - Assess patient's mobility; develop plan if impaired  - Assess patient's need for assistive devices and provide as appropriate  - Encourage maximum independence but intervene and supervise when necessary  - Involve family in performance of ADLs  - Assess for home care needs following discharge   - Consider OT consult to assist with ADL evaluation and planning for discharge  - Provide patient education as appropriate  Outcome: Progressing  Goal: Maintains/Returns to pre admission functional level  Description: INTERVENTIONS:  - Perform BMAT or MOVE assessment daily    - Set and communicate daily mobility goal to care team and patient/family/caregiver  - Collaborate with rehabilitation services on mobility goals if consulted  - Out of bed for toileting  - Record patient progress and toleration of activity level   Outcome: Progressing     Problem: MOBILITY - ADULT  Goal: Maintain or return to baseline ADL function  Description: INTERVENTIONS:  -  Assess patient's ability to carry out ADLs; assess patient's baseline for ADL function and identify physical deficits which impact ability to perform ADLs (bathing, care of mouth/teeth, toileting, grooming, dressing, etc )  - Assess/evaluate cause of self-care deficits   - Assess range of motion  - Assess patient's mobility; develop plan if impaired  - Assess patient's need for assistive devices and provide as appropriate  - Encourage maximum independence but intervene and supervise when necessary  - Involve family in performance of ADLs  - Assess for home care needs following discharge   - Consider OT consult to assist with ADL evaluation and planning for discharge  - Provide patient education as appropriate  Outcome: Progressing  Goal: Maintains/Returns to pre admission functional level  Description: INTERVENTIONS:  - Perform BMAT or MOVE assessment daily    - Set and communicate daily mobility goal to care team and patient/family/caregiver     - Collaborate with rehabilitation services on mobility goals if consulted  - Out of bed for toileting  - Record patient progress and toleration of activity level   Outcome: Progressing     Problem: Prexisting or High Potential for Compromised Skin Integrity  Goal: Skin integrity is maintained or improved  Description: INTERVENTIONS:  - Identify patients at risk for skin breakdown  - Assess and monitor skin integrity  - Assess and monitor nutrition and hydration status  - Monitor labs   - Assess for incontinence   - Turn and reposition patient  - Assist with mobility/ambulation  - Relieve pressure over bony prominences  - Avoid friction and shearing  - Provide appropriate hygiene as needed including keeping skin clean and dry  - Evaluate need for skin moisturizer/barrier cream  - Collaborate with interdisciplinary team   - Patient/family teaching  - Consider wound care consult   Outcome: Progressing

## 2022-11-14 PROBLEM — Z78.9 IMPAIRED DECISION MAKING: Status: ACTIVE | Noted: 2022-11-14

## 2022-11-14 LAB
ANION GAP SERPL CALCULATED.3IONS-SCNC: 7 MMOL/L (ref 4–13)
BUN SERPL-MCNC: 15 MG/DL (ref 5–25)
CALCIUM SERPL-MCNC: 8.7 MG/DL (ref 8.3–10.1)
CHLORIDE SERPL-SCNC: 117 MMOL/L (ref 96–108)
CO2 SERPL-SCNC: 22 MMOL/L (ref 21–32)
CREAT SERPL-MCNC: 0.85 MG/DL (ref 0.6–1.3)
ERYTHROCYTE [DISTWIDTH] IN BLOOD BY AUTOMATED COUNT: 15.7 % (ref 11.6–15.1)
GFR SERPL CREATININE-BSD FRML MDRD: 93 ML/MIN/1.73SQ M
GLUCOSE SERPL-MCNC: 98 MG/DL (ref 65–140)
HCT VFR BLD AUTO: 37 % (ref 36.5–49.3)
HGB BLD-MCNC: 11.7 G/DL (ref 12–17)
INR PPP: 1.07 (ref 0.84–1.19)
MAGNESIUM SERPL-MCNC: 2.4 MG/DL (ref 1.6–2.6)
MCH RBC QN AUTO: 31 PG (ref 26.8–34.3)
MCHC RBC AUTO-ENTMCNC: 31.6 G/DL (ref 31.4–37.4)
MCV RBC AUTO: 98 FL (ref 82–98)
PHOSPHATE SERPL-MCNC: 3.5 MG/DL (ref 2.3–4.1)
PLATELET # BLD AUTO: 154 THOUSANDS/UL (ref 149–390)
PMV BLD AUTO: 11.5 FL (ref 8.9–12.7)
POTASSIUM SERPL-SCNC: 4.1 MMOL/L (ref 3.5–5.3)
PROTHROMBIN TIME: 14.1 SECONDS (ref 11.6–14.5)
RBC # BLD AUTO: 3.78 MILLION/UL (ref 3.88–5.62)
SODIUM SERPL-SCNC: 146 MMOL/L (ref 135–147)
WBC # BLD AUTO: 6.64 THOUSAND/UL (ref 4.31–10.16)

## 2022-11-14 RX ORDER — CYANOCOBALAMIN 1000 UG/ML
1000 INJECTION, SOLUTION INTRAMUSCULAR; SUBCUTANEOUS
Status: DISCONTINUED | OUTPATIENT
Start: 2022-11-14 | End: 2022-12-09 | Stop reason: HOSPADM

## 2022-11-14 RX ORDER — POTASSIUM CHLORIDE 750 MG/1
10 TABLET, EXTENDED RELEASE ORAL
Status: DISCONTINUED | OUTPATIENT
Start: 2022-11-14 | End: 2022-11-17

## 2022-11-14 RX ORDER — WARFARIN SODIUM 5 MG/1
5 TABLET ORAL
Status: DISCONTINUED | OUTPATIENT
Start: 2022-11-14 | End: 2022-11-17

## 2022-11-14 RX ADMIN — POTASSIUM CHLORIDE 10 MEQ: 750 TABLET, EXTENDED RELEASE ORAL at 12:50

## 2022-11-14 RX ADMIN — POTASSIUM CHLORIDE 20 MEQ: 1500 TABLET, EXTENDED RELEASE ORAL at 08:57

## 2022-11-14 RX ADMIN — THIAMINE HCL TAB 100 MG 100 MG: 100 TAB at 08:57

## 2022-11-14 RX ADMIN — FOLIC ACID 1 MG: 1 TABLET ORAL at 08:57

## 2022-11-14 RX ADMIN — ENOXAPARIN SODIUM 90 MG: 100 INJECTION SUBCUTANEOUS at 08:58

## 2022-11-14 RX ADMIN — ESCITALOPRAM OXALATE 5 MG: 5 TABLET, FILM COATED ORAL at 08:57

## 2022-11-14 RX ADMIN — POTASSIUM CHLORIDE 10 MEQ: 750 TABLET, EXTENDED RELEASE ORAL at 17:23

## 2022-11-14 RX ADMIN — CYANOCOBALAMIN 1000 MCG: 1000 INJECTION, SOLUTION INTRAMUSCULAR at 17:23

## 2022-11-14 RX ADMIN — SALINE NASAL SPRAY 1 SPRAY: 1.5 SOLUTION NASAL at 17:23

## 2022-11-14 RX ADMIN — SALINE NASAL SPRAY 1 SPRAY: 1.5 SOLUTION NASAL at 12:50

## 2022-11-14 RX ADMIN — FLUTICASONE PROPIONATE 1 SPRAY: 50 SPRAY, METERED NASAL at 08:58

## 2022-11-14 RX ADMIN — ENOXAPARIN SODIUM 90 MG: 100 INJECTION SUBCUTANEOUS at 21:34

## 2022-11-14 RX ADMIN — B-COMPLEX W/ C & FOLIC ACID TAB 1 TABLET: TAB at 08:57

## 2022-11-14 RX ADMIN — BUSPIRONE HYDROCHLORIDE 5 MG: 5 TABLET ORAL at 08:57

## 2022-11-14 RX ADMIN — CARVEDILOL 12.5 MG: 12.5 TABLET, FILM COATED ORAL at 08:57

## 2022-11-14 RX ADMIN — BUSPIRONE HYDROCHLORIDE 5 MG: 5 TABLET ORAL at 17:23

## 2022-11-14 RX ADMIN — WARFARIN SODIUM 5 MG: 5 TABLET ORAL at 17:23

## 2022-11-14 RX ADMIN — CARVEDILOL 12.5 MG: 12.5 TABLET, FILM COATED ORAL at 17:23

## 2022-11-14 NOTE — ASSESSMENT & PLAN NOTE
Per Neuropsych eval 11/14 patient does NOT have capacity to make medical decisions   Patient also has some level of cognitive impairment   B12 borderline low   · B12 IM shot x 1 dose   · Start b12 supplements

## 2022-11-14 NOTE — ASSESSMENT & PLAN NOTE
Due to poor po intake, decreased appetite, possible constipation or fecal impaction  Improved now  Monitor off IV hydration  Monitor PO intake - patient has been eating all his meals

## 2022-11-14 NOTE — CASE MANAGEMENT
Case Management Discharge Planning Note    Patient name Talita Fischer  [facility] [id number]  : 1960 Date 2022       Current Admission Date: 11/3/2022  Current Admission Diagnosis:Atrial fibrillation [facility])   Patient Active Problem List    Diagnosis Date Noted   • Abdominal distension 2022   • Dehydration 2022   • Hyperthyroidism 11/10/2022   • Severe protein-calorie malnutrition ([address] ) 2022   • Hyponatremia 10/14/2022   • SIADH (syndrome of inappropriate ADH production) ([facility] ) 10/14/2022   • Subclinical hyperthyroidism 10/14/2022   • Sinus arrhythmia 10/10/2022   • Mild protein-calorie malnutrition ([facility] ) 10/07/2022   • Medical clearance for incarceration 2022   • 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 attack 2022   • Atypical chest pain 2022   • Hypokalemia 2022   • Dizziness 2022   • Renal cyst 2022   • PVD (peripheral vascular disease) ([facility] ) 2022   • Ataxia 2022   • CVA (cerebral vascular accident) ([facility] ) 2022   • Right inguinal pain 2022   • Alkaline phosphatase elevation 2022   • Low back pain 2022   • S/P laparoscopic hernia repair 2021   • Non-recurrent bilateral inguinal hernia without obstruction or gangrene 2021   • Gastric polyps 2021   • Gastric AVM 2021   • Edema of both lower legs 2021   • Tremor of right hand 2021   • Weakness of both lower extremities 2021   • Anticoagulated on Coumadin 2021   • CORIE (obstructive sleep apnea)    • Hyperbilirubinemia 08/10/2020   • Antiphospholipid antibody with hypercoagulable state ([address] ) 2020   • History of stroke 2020   • Other viral warts 2019   • Physical deconditioning 2019   • Class 2 obesity in adult 2018   • Bright red blood per rectum 11/10/2017   • Numbness 2017   • H/O aortic aneurysm repair 2017   • Hypertension 08/26/2017   • GERD (gastroesophageal reflux disease) 08/26/2017   • Hyperlipidemia 03/17/2017   • Peyronie disease 12/09/2016   • Vitamin D deficiency 06/15/2016   • Atrial fibrillation ([address] ) 11/17/2014   • SHIMON (generalized anxiety disorder) 11/11/2014   • Constipation 09/19/2014   • Irritable bowel syndrome 04/24/2014   • Kidney stones 04/24/2014      LOS (days): 11  Geometric Mean LOS (GMLOS) (days): 5 00  Days to GMLOS:-5 7     OBJECTIVE:  Risk of Unplanned Readmission Score: 30 77         Current admission status: Inpatient   Preferred Pharmacy:   [facility] [address] [facility], [address]  Phone: [phone] Fax: [phone]    Primary Care Provider: Jatin Gutierrez MD    Primary Insurance: [address] [place] [address]  Secondary Insurance:     DISCHARGE DETAILS:          Additional Comments: CM spoke with Dr Carlos Alberto Pñia  Per provider, patient is medically cleared for discharge  Patient will be continuing on Lovenox shots at the facility  Per MD the patient is eating his meals and compliant with his medications  CM did provide update to Woo Martinez and CM is currently awaiting on response from facility at this time

## 2022-11-14 NOTE — CONSULTS
Consultation - Neuropsychology/Psychology Department  Mildred Carlson 58 y o  male MRN: [id number]  Unit/Bed#: [facility] 907-01 Encounter: [id number]        Reason for Consultation:  Mildred Carlson is a 58y o  year old male who was referred for a Neuropsychological Exam to assess cognitive functioning and comment on capacity to make informed medical decisions        History of Present Illness  Admitted with dehydration, rhabdomyolysis    Physician Requesting Consult: Donna Poe MD    PROBLEM LIST:  Patient Active Problem List   Diagnosis   • Numbness   • H/O aortic aneurysm repair   • Hypertension   • GERD (gastroesophageal reflux disease)   • SHIMON (generalized anxiety disorder)   • Atrial fibrillation (HCC)   • Bright red blood per rectum   • Constipation   • Irritable bowel syndrome   • Hyperlipidemia   • Kidney stones   • Peyronie disease   • Vitamin D deficiency   • Class 2 obesity in adult   • Other viral warts   • Physical deconditioning   • History of stroke   • Antiphospholipid antibody with hypercoagulable state ([facility] [address] )   • Hyperbilirubinemia   • CORIE (obstructive sleep apnea)   • Anticoagulated on Coumadin   • Tremor of right hand   • Weakness of both lower extremities   • Gastric polyps   • Gastric AVM   • Edema of both lower legs   • Non-recurrent bilateral inguinal hernia without obstruction or gangrene   • S/P laparoscopic hernia repair   • Low back pain   • Alkaline phosphatase elevation   • Right inguinal pain   • Ataxia   • CVA (cerebral vascular accident) ([facility] [address] )   • PVD (peripheral vascular disease) ([facility] [address] )   • Renal cyst   • Atypical chest pain   • Hypokalemia   • Dizziness   • Medical clearance for incarceration   • 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 attack   • Mild protein-calorie malnutrition ([facility] [address] )   • Sinus arrhythmia   • Hyponatremia   • SIADH (syndrome of inappropriate ADH production) ([facility] [address] )   • Subclinical hyperthyroidism   • Severe protein-calorie malnutrition ([facility] [address] ) • Hyperthyroidism   • Dehydration   • Abdominal distension         Historical Information   Past Medical History:   Diagnosis Date   • Aneurysm of thoracic aorta     last assessed 11/20/17   • Anxiety     currently on no meds   • Arthritis    • Bilateral inguinal hernia    • Cardiac disease    • Cognitive impairment    • CVA (cerebral vascular accident) ([facility] [address] )    • Depression    • GERD (gastroesophageal reflux disease)    • Hearing loss of aging    • Heart disease    • Hyperlipidemia    • Hypertension    • Irritable bowel syndrome    • Kidney stone    • Obesity    • Occasional tremors     left arm since stroke   • Palpitations    • Panic attack    • PONV (postoperative nausea and vomiting)     and headache x 3 days   • Psychiatric illness    • Sciatica     right and left  side   • Shortness of breath     on exertion   • Sleep apnea     is not using a CPAP   • Sleep difficulties    • Spitting up blood 05/21/2021    Resolved   • Status post placement of implantable loop recorder    • Stroke ([facility] [address] ) 11/2014   • Stroke [facility] ) 03/2021   • TIA (transient ischemic attack)      Past Surgical History:   Procedure Laterality Date   • AORTA SURGERY      thoracic - aneurysmorrhaphy   • APPENDECTOMY     • ASCENDING AORTIC ANEURYSM REPAIR      resolved 8/19/15   • CARDIAC LOOP RECORDER     • CHOLECYSTECTOMY      laparoscopic   • COLONOSCOPY     • CYSTOSCOPY      with removal of object- stent removal   • KNEE ARTHROSCOPY Right 1996    with medial meniscus repair   • LITHOTRIPSY      renal   • ORTHOPEDIC SURGERY     • MI FRAGMENT KIDNEY STONE/ ESWL Left 5/17/2018    Procedure: LITHROTRIPSY EXTRACORPORAL SHOCKWAVE (ESWL);   Surgeon: Benjy Cruz MD;  Location: [facility]  MAIN OR;  Service: Urology   • MI Ame[address] Ray[address] Bilateral 12/9/2021    Procedure: ROBOTIC REPAIR HERNIA INGUINAL;  Surgeon: Tarsha Medina MD;  Location: [facility] Main OR;  Service: General   • THUMB ARTHROSCOPY Right 1976    ligament repair   • UPPER GASTROINTESTINAL ENDOSCOPY       Social History   Social History     Substance and Sexual Activity   Alcohol Use Not Currently    Comment: very rare in past     Social History     Substance and Sexual Activity   Drug Use Never     Social History     Tobacco Use   Smoking Status Never Smoker   Smokeless Tobacco Never Used   Tobacco Comment    no second hand smoke exposure     Family History:   Family History   Problem Relation Age of Onset   • Diverticulitis Mother         of colon   • Nephrolithiasis Mother    • Emphysema Father    • Nephrolithiasis Sister    • Heart attack Maternal Grandfather 72   • Breast cancer Paternal Grandmother    • Lung cancer Paternal Grandfather    • Cancer Family    • Coronary artery disease Family    • Diabetes Family         sibling   • Hypertension Family         sibling   • Hernia Family         sibling       Meds/Allergies   current meds:   Current Facility-Administered Medications   Medication Dose Route Frequency   • busPIRone (BUSPAR) tablet 5 mg  5 mg Oral BID   • carvedilol (COREG) tablet 12 5 mg  12 5 mg Oral BID With Meals   • enoxaparin (LOVENOX) subcutaneous injection 90 mg  1 mg/kg Subcutaneous Q12H [address]   • escitalopram (LEXAPRO) tablet 5 mg  5 mg Oral Daily   • fluticasone (FLONASE) 50 mcg/act nasal spray 1 spray  1 spray Each Nare Daily   • folic acid (FOLVITE) tablet 1 mg  1 mg Oral Daily   • LORazepam (ATIVAN) tablet 0 5 mg  0 5 mg Oral Q8H PRN   • multivitamin stress formula tablet 1 tablet  1 tablet Oral Daily   • pantoprazole (PROTONIX) EC tablet 40 mg  40 mg Oral Early Morning   • potassium chloride (K-DUR,KLOR-CON) CR tablet 10 mEq  10 mEq Oral TID With Meals   • sodium chloride (OCEAN) 0 65 % nasal spray 1 spray  1 spray Each Nare Q6H   • thiamine tablet 100 mg  100 mg Oral Daily       Allergies   Allergen Reactions   • Losartan Angioedema   • Aspirin Fatigue     Due to blood thinners     • Bactrim [Sulfamethoxazole-Trimethoprim]    • Eliquis [Apixaban] Other (See Comments)     Failed  Had embolic CVA   • Lisinopril      Felt bad, was OK with Zestril brand name  • Tramadol          Family and Social Support:   No data recorded    Behavioral Observations: Alert, sitting in chair, cooperative, oriented except for day/week and day/month; affect appeared flat and patient admitted to depressed mood and anxiety; responses were often very delayed, and admitted to perceptual disturbances stating, "the walls and floor are droopy, moving"  Patient was unable to provide reason for hospitalization, medical history and does not know what he is being treated for in hospital  Patient reported he has "a little " psychiatric history  Cognitive Examination    General Cognitive Functioning MMSE = Impaired 22/28; Attention/Concentration Auditory Selective Attention = Average; Auditory Vigilance = Impaired; Information Processing Speed = Impaired    Frontal Systems/Executive Functioning Mental Flexibility/Cognitive Control = Impaired; Working Memory = Impaired Abstract Reasoning = Average;  Generative Ability = Impaired, Commonsense Reasoning and Judgement = Borderline    Language Functioning Confrontation naming = Within Normal Limits, Phonemic Fluency = Impaired; Semantic Retrieval = Impaired; Comprehension of Complex Ideational Material = Impaired; Repetition = Within Normal Limits; Basic Reading = Within Normal Limits; Following Commands = Impaired    Memory Functioning Narrative Recall - Short Delay = Borderline;  Long Delay Narrative Recall = Borderline;     Visuo-Spatial Abilities Not Assessed    Functional Knowledge  Health & Safety Knowledge = Impaired;    Summary/Impression:  Results of Neuropsychological Exam revealed cognitive deficits/weaknesses in recall, working memory, following three step commands, mental flexibility/cognitive control, commonsense reasoning and judgment, generative ability, semantic retrieval and comprehension of complex ideational material  On a measure assessing awareness of personal health status and ability to evaluate health problems, handle medical emergencies and take safety precautions, patient performed in the IMPAIRED range of functioning  During this encounter, patient does not appear to have capacity to make fully informed medical decisions   CVA

## 2022-11-14 NOTE — PROGRESS NOTES
The pantoprazole has / have been converted to Oral per [facility] IV-to-PO Auto-Conversion Protocol for Adults as approved by the Pharmacy and Therapeutics Committee  The patient met all eligible criteria:  3 Age = 25years old   2) Received at least one dose of the IV form   3) Receiving at least one other scheduled oral/enteral medication   4) Tolerating an oral/enteral diet   and did not have any exclusions:   1) Critical care patient   2) Active GI bleed (IF assessing H2RAs or PPIs)   3) Continuous tube feeding (IF assessing cipro, doxycycline, levofloxacin, minocycline, rifampin, or voriconazole)   4) Receiving PO vancomycin (IF assessing metronidazole)   5) Persistent nausea and/or vomiting   6) Ileus or gastrointestinal obstruction   7) Travis/nasogastric tube set for continuous suction   8) Specific order not to automatically convert to PO (in the order's comments or if discussed in the most recent Infectious Disease or primary team's progress notes)     Thanks  Raad Zendejas, Pharmacist

## 2022-11-14 NOTE — PLAN OF CARE
Problem: PHYSICAL THERAPY ADULT  Goal: Performs mobility at highest level of function for planned discharge setting  See evaluation for individualized goals  Description: Treatment/Interventions: LE strengthening/ROM, Functional transfer training, Endurance training, Patient/family training, Bed mobility, Cognitive reorientation, Therapeutic exercise          See flowsheet documentation for full assessment, interventions and recommendations  11/14/2022 1443 by Trena Membreno  Outcome: Progressing  Note: Prognosis: Guarded  Problem List: Decreased strength, Decreased range of motion, Decreased endurance, Impaired balance, Decreased mobility, Decreased coordination, Decreased cognition, Decreased safety awareness, Pain  Assessment: Pt supine in bed at start of session  Pt poorly responsive and oriented  PT session focused on assessment of tranfers, LE strengthening, and a trial ambulation  Pt continues to require mod-maxAx1/2 for bed mobility, tranfers, and ambulation  Pt continues to demonstrate poor static/dynamic balance and trunk control requiring VC and A to maintain an upright position  Pt able to sit EOB with Cat but demonstrated a R lateral lean and R head rotation  Pt able to WB and take a few steps forward with chair follow using B HHA  Pt required LE management to assume an appropriate TEE for standing balance  Pt is making slow progress towards goals at this time  Performed at least 2 patient identifiers during session: Name and Alem Freedman The patient's AM-PAC Basic Mobility Inpatient Short Form Raw Score is 6  A Raw score of less than or equal to 16 suggests the patient may benefit from discharge to post-acute rehabilitation services  Please also refer to the recommendation of the Physical Therapist for safe discharge planning  Pt would benefit from continued inpatient skilled physical therapy to address said deficits and to maximum patient functional mobility   PT recommendation is for pt to to be D/C with post acute rehabilitative services  Barriers to Discharge: Inaccessible home environment     PT Discharge Recommendation: Post acute rehabilitation services    See flowsheet documentation for full assessment

## 2022-11-14 NOTE — PHYSICAL THERAPY NOTE
PHYSICAL THERAPY NOTE          Patient Name: Xi Garcia  [id number] Date: 11/14/2022 11/14/22 0940   PT Last Visit   PT Visit Date 11/14/22   Pain Assessment   Pain Assessment Tool [facility] Pain Intervention(s) Repositioned; Ambulation/increased activity; Emotional support   Pain Rating: FLACC (Rest) - Face 0   Pain Rating: FLACC (Rest) - Legs 0   Pain Rating: FLACC (Rest) - Activity 0   Pain Rating: FLACC (Rest) - Cry 0   Pain Rating: FLACC (Rest) - Consolability 0   Score: FLACC (Rest) 0   Pain Rating: FLACC (Activity) - Face 0   Pain Rating: FLACC (Activity) - Legs 0   Pain Rating: FLACC (Activity) - Activity 0   Pain Rating: FLACC (Activity) - Cry 0   Pain Rating: FLACC (Activity) - Consolability 0   Score: FLACC (Activity) 0   Restrictions/Precautions   Weight Bearing Precautions Per Order No   Other Precautions Cognitive; Chair Alarm;Multiple lines; Fall Risk;Pain   General   Family/Caregiver Present No   Cognition   Overall Cognitive Status Impaired   Attention Attends with cues to redirect   Orientation Level Oriented to person   Memory Unable to assess   Following Commands Follows one step commands with increased time or repetition   Subjective   Subjective Pt supine in bed with nsg present  Pt cooperative and willing to participate in therapy  Bed Mobility   Rolling R 3  Moderate assistance   Additional items Assist x 1; Increased time required;Verbal cues;LE management; Bedrails   Rolling L Unable to assess   Supine to Sit 2  Maximal assistance   Additional items Assist x 2;HOB elevated; Increased time required;Verbal cues;LE management   Sit to Supine Unable to assess   Additional Comments Pt sat EOB with poor trunk and postural control requiring MinAx1 to maintain upright posture and correct R lateral lean  Transfers   Sit to Stand 2  Maximal assistance   Additional items Assist x 2; Increased time required;Verbal cues  (B knee block)   Stand to Sit 2  Maximal assistance   Additional items Assist x 2; Increased time required;Verbal cues  (B knee block)   Stand pivot 2  Maximal assistance   Additional items Assist x 2; Increased time required;Verbal cues  (B knee block)   Additional Comments transfers with B HHA   Ambulation/Elevation   Gait pattern Improper Weight shift;R Knee Cecilia;L Knee Cecilia;Decreased foot clearance; Short stride; Excessively slow   Gait Assistance 2  Maximal assist   Additional items Assist x 2;Verbal cues; Tactile cues  (LE management required to maintain proper TEE)   Assistive Device   (B HHA and chair follow)   Distance 1ft +2ft   Balance   Static Sitting Poor +   Dynamic Sitting Poor   Static Standing Poor -   Dynamic Standing Poor -   Ambulatory Poor -   Endurance Deficit   Endurance Deficit Yes   Endurance Deficit Description limited by generalized weakness, cognition, and decreased activity tolerance   Activity Tolerance   Activity Tolerance Patient limited by fatigue   Nurse Made Aware yes   Assessment   Prognosis Guarded   Problem List Decreased strength;Decreased range of motion;Decreased endurance; Impaired balance;Decreased mobility; Decreased coordination;Decreased cognition;Decreased safety awareness;Pain   Assessment Pt supine in bed at start of session  Pt poorly responsive and oriented  PT session focused on assessment of tranfers, LE strengthening, and a trial ambulation  Pt continues to require mod-maxAx1/2 for bed mobility, tranfers, and ambulation  Pt continues to demonstrate poor static/dynamic balance and trunk control requiring VC and A to maintain an upright position  Pt able to sit EOB with Cat but demonstrated a R lateral lean and R head rotation  Pt able to WB and take a few steps forward with chair follow using B HHA  Pt required LE management to assume an appropriate TEE for standing balance  Pt is making slow progress towards goals at this time   Performed at least 2 patient identifiers during session: Name and Noe Silveira The patient's AM-PAC Basic Mobility Inpatient Short Form Raw Score is 6  A Raw score of less than or equal to 16 suggests the patient may benefit from discharge to post-acute rehabilitation services  Please also refer to the recommendation of the Physical Therapist for safe discharge planning  Pt would benefit from continued inpatient skilled physical therapy to address said deficits and to maximum patient functional mobility  PT recommendation is for pt to to be D/C with post acute rehabilitative services  Barriers to Discharge Inaccessible home environment   Goals   Patient Goals none stated   STG Expiration Date 11/28/22   Short Term Goal #1 In 1-2 weeks, the patient will complete the following 1) Perform all aspect of bed mobility under S level  2) Patient will tolerate sitting EOB for greater than 15 minutes  4) Patient will tolerate greater than 30 minutes of physical activity 5) Patient will improve dynamic sitting balance from poor  to fair + in order to perform everyday activities  6) Patient will continue with ongoing rehab services for family education, DME, and D/C planning  7) Pt will perform transfers with MinAx1 using least restrictive AD to be able to navigate positions safely  8) Pt will ambulate 25ft under MinAx1 using AD to be able to go to and from the bathroom  Plan   Treatment/Interventions LE strengthening/ROM; Therapeutic exercise; Endurance training;Cognitive reorientation;Patient/family training;Equipment eval/education; Bed mobility;Gait training;Spoke to nursing;OT   PT Frequency 3-5x/wk   Recommendation   PT Discharge Recommendation Post acute rehabilitation services   Equipment Recommended [address] Basic Mobility Inpatient   Turning in Bed Without Bedrails 1   Lying on Back to Sitting on Edge of Flat Bed 1   Moving Bed to Chair 1   Standing Up From Chair 1   Walk in Room 1   Climb 3-5 Stairs 1   Basic Mobility Inpatient Raw Score 6   Turning Head Towards Sound 3   Follow Simple Instructions 2   Low Function Basic Mobility Raw Score 11   Low Function Basic Mobility Standardized Score 16 55   Highest Level Of Mobility   JH-HLM Goal 2: Bed activities/Dependent transfer   JH-HLM Achieved 4: Move to chair/commode   Exercises   Knee AROM Long Arc Quad Sitting;5 reps;Bilateral;AAROM  (A to P rest of range by PT)   Balance training  static/dynamic balance EOB ~11 min while OT performed feeding   End of Consult   End of Consult Comments pt seated in chair, chair alarm on, call bell in reach  Finishing up session with OT       Janie Wilkinson, SPT

## 2022-11-14 NOTE — ASSESSMENT & PLAN NOTE
Will target INR between 2 5 in 3 5 for hypercoagulable state  Continue lovenox to warfarin bridge  Continue coreg 12 5 BID

## 2022-11-14 NOTE — QUICK NOTE
Chart review only  Graves disease ruled out with negative TSI and thyroid antibody panel  Outpatient endocrinology follow-up recommended with repeat TSH/T4  Recommend messaging endocrinology tiger text role if there are any additional questions  Endocrinology will sign off

## 2022-11-14 NOTE — PROGRESS NOTES
[address] [facility]  Progress Note - Barrera Heck 1960, 58 y o  male MRN: [id number]  Unit/Bed#: [facility] 907-01 Encounter: [id number]  Primary Care Provider: Daniel Gage MD   Date and time admitted to hospital: 11/3/2022  3:28 PM    Atrial fibrillation [facility])  Assessment & Plan  Will target INR between 2 5 in 3 5 for hypercoagulable state  Continue lovenox to warfarin bridge  Continue coreg 12 5 BID    Impaired decision making  Assessment & Plan  Per Neuropsych eval 11/14 patient does NOT have capacity to make medical decisions   Patient also has some level of cognitive impairment   B12 borderline low   · B12 IM shot x 1 dose   · Start b12 supplements  · Check vit D    Dehydration  Assessment & Plan  Due to poor po intake, decreased appetite, possible constipation or fecal impaction  Improved now  Monitor off IV hydration  Monitor PO intake - patient has been eating all his meals     SHIMON (generalized anxiety disorder)  Assessment & Plan  · Previous admission from the end of September until the end of October at [facility] in which he was hospitalized for over a month for severe anxiety and inability to care for self  · Per sister reportedly was able to ambulate prior to the hospitalization but after the hospitalization patient needing assistance with ambulation at home  · During hospitalization there patient developed hyponatremia during his course of stay and Zoloft that had been started was stopped due to hyponatremia  · PRN lorazepam ordered but he has been refusing  · Recently discharged from inpatient psychiatry with mirtazapine and aripiprazole, discussed resumption of thesewith the patient but he reports excess sedation with these  · D/w psychiatry earlier this week started buspar 5mg BID, and lexapro  · He does admit to depressive symptoms and anxiety, discussing certain topics with him result is worsening tremor    · On history gathering today when asked about how his lack of appetite and poor compliance with medications he is having severe difficulty with communicating his feeling and thoughts regarding his current state of mental and physical health  · Responses are absent or very delayed  Most responses to questions questions his thought, feelings, food and medication refusal result in an irrelevant or non nonsensical answer  · I suspect the severity of his anxiety and possible depression are impeding his communication abilities to seek medical help, he may not have medical decision making capacity at this time  · Will consult with neuropsychiatry     Hyperthyroidism  Assessment & Plan  Per chart review patient has had low TSH and elevated free T4 in the past   Most recent labs during this admission with TSH 0 282 f t4 1 56  thyroid U/S during last admission at [facility] was unremarkable   Has seen Endo in the past during previous admission at [facility] (oct 22) and they recommend get repeat TFT, TSI and thyroid abs     Endo consulted, appreciate reccs  Repeat thyroid tests in 4-6 weeks     Abdominal distension  Assessment & Plan  · Possibly the cause of his poor oral intake  · Reports improved distention and had a large BM recently  · No signs of obstruction on CT abdomen  · Regular diet, monitor for tolerance    Severe protein-calorie malnutrition (HCC)  Assessment & Plan  Malnutrition Findings:   Adult Malnutrition type: Chronic illness  Adult Degree of Malnutrition: Other severe protein calorie malnutrition  Malnutrition Characteristics: Inadequate energy, Weight loss     360 Statement: Severe Pro/cathryn malnutrition r/t inadequate intake/anxiety as evidenced by 28% unplanned weight loss since 5/3/22, consuming < 75% energy intake compared to estimated enrgy needs > 1 month  Treatment TBD s/p swallow eval    BMI Findings: Body mass index is 28 92 kg/m²       Po intake improving     CVA (cerebral vascular accident) [facility])  Assessment & Plan  -Hx CVA x 3 in setting of antiphospholipid syndrome  -on PTA warfarin although noted supratherapeutic INR  -CT head in ER: No acute intracranial abnormality  Encephalomalacia involving the right occipital lobe and bilateral cerebelli  Grossly stable appearance of periventricular hypoattenuation compared to MRI brain 8/29/2022, likely representing chronic microvascular ischemic changes  PT/ ot consulted and recommending rehab    Tremor of right hand  Assessment & Plan  · Outpatient follow-up with Neurology  No inpatient intervention as per Neurology  Will follow-up with Dr Natanael Sparks as outpatient  Patient known to him for prior stroke  Antiphospholipid antibody with hypercoagulable state ([facility] [address] )  Assessment & Plan  -history of antiphospholipid antibody on PTA Coumadin  -had ischemic CVA despite being on Eliquis in the past  -restarting warfarin as noted above  -inr is subtherapeutic, will continue bridge with lovenox    DAYAN (acute kidney injury) (HCC)-resolved as of 11/6/2022  Assessment & Plan  -see rhabdomyolysis section    Hypernatremia-resolved as of 11/5/2022  Assessment & Plan  -sodium 151, DAYAN, rhabdo on admission  Note Nephrology input  Slowly improving   -initial thought process was this was a hypovolemic hypernatremia  -CT Head: No acute intracranial abnormality  Encephalomalacia involving the right occipital lobe and bilateral cerebelli  Grossly stable appearance of periventricular hypoattenuation compared to MRI brain 8/29/2022, likely representing chronic microvascular ischemic changes  Plan  > as above in rhabdo section      Supratherapeutic INR-resolved as of 11/9/2022  Assessment & Plan  Increased increased to 9 has history of antiphospholipid syndrome and AFib  -no evidence of bleeding  Plan  · > as INR mildly elevated and no source of bleeding, will hold off giving any reversal agents of supratherapeutic INR especially given patient's history of antiphospholipid syndrome and history of 3 previous CVA as well as having ischemic · likely etiology of of elevated INR is multifactorial   Suspect poor nutrition in conjunction with vitamin K antagonism of Coumadin as etiology of elevated INR  Patient INR trending downward with holding of Coumadin  Given INR is less than 10 and history of hypercoagulable state in the setting of stroke history would prefer to monitor and trend off of Coumadin  continue warfarin , bridge with lovenox until therapeutic    * Rhabdomyolysis-resolved as of 2022  Assessment & Plan  -CPK > 7000 on admission with DAYAN  -in setting of minimal ambulation and has been going up the stairs by crawling in using his buttocks    CPK significant improved   DAYAN resolved       VTE Pharmacologic Prophylaxis: VTE Score: 3 Moderate Risk (Score 3-4) - Pharmacological DVT Prophylaxis Ordered: enoxaparin (Lovenox)  Patient Centered Rounds: I performed bedside rounds with nursing staff today  Discussions with Specialists or Other Care Team Provider: kanwal, CM    Education and Discussions with Family / Patient: Updated  (sister) at bedside  Time Spent for Care: 20 minutes  More than 50% of total time spent on counseling and coordination of care as described above  Current Length of Stay: 11 day(s)  Current Patient Status: Inpatient   Certification Statement: The patient will continue to require additional inpatient hospital stay due to medically clear for d/c pending palcement to rehab  Discharge Plan: Anticipate discharge in 24-48 hrs to rehab facility  Code Status: Level 3 - DNAR and DNI    Subjective:   No overnight events  Patient seen and examined at bedside  Accepted all medications yesterday (except for pantoprazole)  Seen sitting up in chair   Seen after breakfast  Ate 80% of his meal  Abdomen distention and bloating less    Objective:     Vitals:   Temp (24hrs), Av 7 °F (36 5 °C), Min:97 5 °F (36 4 °C), Max:97 9 °F (36 6 °C)    Temp:  [97 5 °F (36 4 °C)-97 9 °F (36 6 °C)] 97 9 °F (36 6 °C)  HR:  [85-95] 95  Resp:  [20] 20  BP: (127-148)/(84-89) 148/89  SpO2:  [96 %] 96 %  Body mass index is 28 92 kg/m²  Input and Output Summary (last 24 hours): Intake/Output Summary (Last 24 hours) at 11/14/2022 1504  Last data filed at 11/13/2022 2236  Gross per 24 hour   Intake 240 ml   Output --   Net 240 ml       Physical Exam:   Physical Exam  Vitals and nursing note reviewed  Constitutional:       General: He is not in acute distress  Appearance: He is well-developed  HENT:      Head: Normocephalic and atraumatic  Eyes:      Conjunctiva/sclera: Conjunctivae normal       Pupils: Pupils are equal, round, and reactive to light  Cardiovascular:      Rate and Rhythm: Normal rate and regular rhythm  Heart sounds: No murmur heard  Pulmonary:      Effort: Pulmonary effort is normal  No respiratory distress  Breath sounds: Normal breath sounds  Abdominal:      General: Bowel sounds are normal       Palpations: Abdomen is soft  Tenderness: There is no abdominal tenderness  Musculoskeletal:      Cervical back: Neck supple  Skin:     General: Skin is warm and dry  Neurological:      Mental Status: He is alert  Motor: Tremor present  Psychiatric:         Mood and Affect: Affect is flat  Speech: Speech is delayed  Additional Data:     Labs:  Results from last 7 days   Lab Units 11/14/22 0518 11/13/22  0511 11/10/22  0528   WBC Thousand/uL 6 64   < > 7 82   HEMOGLOBIN g/dL 11 7*   < > 11 5*   HEMATOCRIT % 37 0   < > 35 5*   PLATELETS Thousands/uL 154   < > 164   NEUTROS PCT %  --   --  79*   LYMPHS PCT %  --   --  13*   MONOS PCT %  --   --  7   EOS PCT %  --   --  0    < > = values in this interval not displayed       Results from last 7 days   Lab Units 11/14/22 0518 11/12/22  0439 11/11/22  0527   SODIUM mmol/L 146   < > 149*   POTASSIUM mmol/L 4 1   < > 3 4*   CHLORIDE mmol/L 117*   < > 119*   CO2 mmol/L 22   < > 27   BUN mg/dL 15   < > 24   CREATININE mg/dL 0 85   < > 1 10   ANION GAP mmol/L 7   < > 3*   CALCIUM mg/dL 8 7   < > 8 5   ALBUMIN g/dL  --   --  2 2*   TOTAL BILIRUBIN mg/dL  --   --  1 03*   ALK PHOS U/L  --   --  71   ALT U/L  --   --  52   AST U/L  --   --  37   GLUCOSE RANDOM mg/dL 98   < > 126    < > = values in this interval not displayed  Results from last 7 days   Lab Units 11/14/22  1230   INR  1 07     Results from last 7 days   Lab Units 11/09/22  1648 11/09/22  1111 11/09/22  0621 11/08/22  1611 11/08/22  1041 11/08/22  0736 11/07/22  2104 11/07/22  1546   POC GLUCOSE mg/dl 100 95 83 90 119 101 106 102               Lines/Drains:  Invasive Devices  Report    Peripheral Intravenous Line  Duration           Peripheral IV 11/13/22 Dorsal (posterior); Left Forearm 1 day                Imaging: Reviewed radiology reports from this admission including: xray(s)    Recent Cultures (last 7 days):         Last 24 Hours Medication List:   Current Facility-Administered Medications   Medication Dose Route Frequency Provider Last Rate   • busPIRone  5 mg Oral BID Donna Poe MD     • carvedilol  12 5 mg Oral BID With Meals Tana Murray DO     • [START ON 11/15/2022] vitamin B-12  500 mcg Oral Daily Tana Lott, DO     • cyanocobalamin  1,000 mcg Intramuscular Q30 Days Tana Lott, DO     • enoxaparin  1 mg/kg Subcutaneous Q12H Chuck Baca MD     • escitalopram  5 mg Oral Daily Tana Lott DO     • fluticasone  1 spray Each Nare Daily Tana Lott DO     • folic acid  1 mg Oral Daily Donna Poe MD     • LORazepam  0 5 mg Oral Q8H PRN Donna Poe MD     • multivitamin stress formula  1 tablet Oral Daily Donna Poe MD     • pantoprazole  40 mg Oral Early Morning Tana Lott, DO     • potassium chloride  10 mEq Oral TID With Meals Donna Poe MD     • sodium chloride  1 spray Each Nare Q6H Tana Lott DO     • thiamine  100 mg Oral Daily Donna Poe MD          Today, Patient Was Seen By: Jennifer Kovacs    **Please Note: This note may have been constructed using a voice recognition system  **

## 2022-11-14 NOTE — ASSESSMENT & PLAN NOTE
Per chart review patient has had low TSH and elevated free T4 in the past   Most recent labs during this admission with TSH 0 282 f t4 1 56  thyroid U/S during last admission at [address] [place] [address] was unremarkable   Has seen Endo in the past during previous admission at [address] [place] [address] (oct 22) and they recommend get repeat TFT, TSI and thyroid abs     Endo consulted, appreciate reccs  Repeat thyroid tests in 4-6 weeks

## 2022-11-14 NOTE — PLAN OF CARE
Problem: PAIN - ADULT  Goal: Verbalizes/displays adequate comfort level or baseline comfort level  Description: Interventions:  - Encourage patient to monitor pain and request assistance  - Assess pain using appropriate pain scale  - Administer analgesics based on type and severity of pain and evaluate response  - Implement non-pharmacological measures as appropriate and evaluate response  - Consider cultural and social influences on pain and pain management  - Notify physician/advanced practitioner if interventions unsuccessful or patient reports new pain  Outcome: Progressing     Problem: Potential for Falls  Goal: Patient will remain free of falls  Description: INTERVENTIONS:  - Educate patient/family on patient safety including physical limitations  - Instruct patient to call for assistance with activity   - Consult OT/PT to assist with strengthening/mobility   - Keep Call bell within reach  - Keep bed low and locked with side rails adjusted as appropriate  - Keep care items and personal belongings within reach  - Initiate and maintain comfort rounds  - Make Fall Risk Sign visible to staff  - Apply yellow socks and bracelet for high fall risk patients  - Consider moving patient to room near nurses station  Outcome: Progressing     Problem: SAFETY ADULT  Goal: Maintains/Returns to pre admission functional level  Description: INTERVENTIONS:  - Perform BMAT or MOVE assessment daily    - Set and communicate daily mobility goal to care team and patient/family/caregiver     - Collaborate with rehabilitation services on mobility goals if consulted  - Out of bed for toileting  - Record patient progress and toleration of activity level   Outcome: Progressing     Problem: Prexisting or High Potential for Compromised Skin Integrity  Goal: Skin integrity is maintained or improved  Description: INTERVENTIONS:  - Identify patients at risk for skin breakdown  - Assess and monitor skin integrity  - Assess and monitor nutrition and hydration status  - Monitor labs   - Assess for incontinence   - Turn and reposition patient  - Assist with mobility/ambulation  - Relieve pressure over bony prominences  - Avoid friction and shearing  - Provide appropriate hygiene as needed including keeping skin clean and dry  - Evaluate need for skin moisturizer/barrier cream  - Collaborate with interdisciplinary team   - Patient/family teaching  - Consider wound care consult   Outcome: Progressing     Problem: Nutrition/Hydration-ADULT  Goal: Nutrient/Hydration intake appropriate for improving, restoring or maintaining nutritional needs  Description: Monitor and assess patient's nutrition/hydration status for malnutrition  Collaborate with interdisciplinary team and initiate plan and interventions as ordered  Monitor patient's weight and dietary intake as ordered or per policy  Utilize nutrition screening tool and intervene as necessary  Determine patient's food preferences and provide high-protein, high-caloric foods as appropriate       INTERVENTIONS:  - Monitor oral intake, urinary output, labs, and treatment plans  - Assess nutrition and hydration status and recommend course of action  - Evaluate amount of meals eaten  - Assist patient with eating if necessary   - Allow adequate time for meals  - Recommend/ encourage appropriate diets, oral nutritional supplements, and vitamin/mineral supplements  - Order, calculate, and assess calorie counts as needed  - Recommend, monitor, and adjust tube feedings and TPN/PPN based on assessed needs  - Assess need for intravenous fluids  - Provide specific nutrition/hydration education as appropriate  - Include patient/family/caregiver in decisions related to nutrition  Outcome: Progressing

## 2022-11-14 NOTE — PLAN OF CARE
Problem: OCCUPATIONAL THERAPY ADULT  Goal: Performs self-care activities at highest level of function for planned discharge setting  See evaluation for individualized goals  Description: Treatment Interventions: ADL retraining, Functional transfer training, UE strengthening/ROM, Endurance training, Patient/family training, Cognitive reorientation, Equipment evaluation/education, Compensatory technique education, Continued evaluation, Energy conservation, Activityengagement          See flowsheet documentation for full assessment, interventions and recommendations  Outcome: Progressing  Note: Limitation: Decreased ADL status, Decreased endurance, Decreased cognition, Decreased self-care trans, Decreased high-level ADLs  Prognosis: Fair  Assessment: Patient participated in Skilled OT session this date with interventions consisting of ADL re training with the use of correct body mechnaics, Energy Conservation techniques, safety awareness and fall prevention techniques, therapeutic exercise to: increase functional use of BUEs, increase BUE muscle strength ,  therapeutic activities to: increase activity tolerance, increase dynamic sit/ stand balance during functional activity  and increase OOB/ sitting tolerance   Upon arrival patient was found supine in bed  Pt demonstrated the following tasks: MOD A to roll, MAX A x 2 sup to sit, STS, SPT, and fnxl mobility with B/L HHA  Pt performed feeding tasks with MOD to MAX A, utilizing hand over hand technique  Pt is able to grasp utensils with increased cueing  Pt requires MAX A for toileting, DEP for LBD  Pt continues to have flat affect, blank stare, and decreased responsiveness to questions  Patient continues to be functioning below baseline level, occupational performance remains limited secondary to factors listed above and increased risk for falls and injury  From OT standpoint, recommendation at time of d/c would be STR    Patient to benefit from continued Occupational Therapy treatment while in the hospital to address deficits as defined above and maximize level of functional independence with ADLs and functional mobility  Pt was left in chair with alarm on after session with all current needs met  Towel roll placed in L hand to promote finger extension  The patient's raw score on the AM-PAC Daily Activity inpatient short form is 11, standardized score is 29 04, less than 39 4  Patients at this level are likely to benefit from discharge to post-acute rehabilitation services  Please refer to the recommendation of the Occupational Therapist for safe discharge planning       OT Discharge Recommendation: Post acute rehabilitation services

## 2022-11-14 NOTE — ASSESSMENT & PLAN NOTE
· Previous admission from the end of September until the end of October at [facility] in which he was hospitalized for over a month for severe anxiety and inability to care for self  · Per sister reportedly was able to ambulate prior to the hospitalization but after the hospitalization patient needing assistance with ambulation at home  · During hospitalization there patient developed hyponatremia during his course of stay and Zoloft that had been started was stopped due to hyponatremia  · PRN lorazepam ordered but he has been refusing  · Recently discharged from inpatient psychiatry with mirtazapine and aripiprazole, discussed resumption of thesewith the patient but he reports excess sedation with these  · D/w psychiatry earlier this week started buspar 5mg BID, and lexapro  · He does admit to depressive symptoms and anxiety, discussing certain topics with him result is worsening tremor  · On history gathering today when asked about how his lack of appetite and poor compliance with medications he is having severe difficulty with communicating his feeling and thoughts regarding his current state of mental and physical health  · Responses are absent or very delayed  Most responses to questions questions his thought, feelings, food and medication refusal result in an irrelevant or non nonsensical answer  · I suspect the severity of his anxiety and possible depression are impeding his communication abilities to seek medical help, he may not have medical decision making capacity at this time    · Will consult with neuropsychiatry

## 2022-11-14 NOTE — WOUND OSTOMY CARE
Consult Note - Wound   Molina Clamp Corky 58 y o  male MRN: [id number]  Unit/Bed#: [facility] 907-01 Encounter: [id number]        History and Present Illness:  [facility] Team following for left buttocks stage 2 pressure injury  59 yo male admitted to hospital for treatment for Rhamdomyolosis  PMH: CVA x3, antiphospholipid syndrome on PTA warfarin, thoracic aortic aneurysm, cognitive impairment, severe anxiety  Max assist for turning and repositioning and dependent for all care needs  Incontinent of bowel as noted on exam and internal urinary catheter managing urine  Wound Care Team was re-consulted for new right heel blister  Assessment Findings:   1  HA Left Buttocks Stage 2 Pressure Injury: Resolved  Now presents as partial thickness skin loss related to friction/ moisture due to incontinence  Wound bed is beefy red and pink and blanches  Reena-wound is fragile and intact and blanches  Scant serosanguineous drainage noted  All other aspects of sacro-buttocks intact and blanches  Recommend use of stoma powder and calazime  2  Scrotum: intact with no skin loss or wounds present  Continue to leave CODI  3  Right Heel: skin intact with no skin loss noted  B/l heels are dry intact and ashley  No blister noted  Continue with preventative hydraguard  4  Right thigh with intact hyperpigmented skin  No skin loss or wounds present  Area blanches  Continue to offload and leave CODI  No induration, fluctuance, odor, warmth/temperature differences, redness, or purulence noted to the above noted wounds and skin areas assessed  New dressings applied per orders listed below  Patient tolerated well- no s/s of non-verbal pain or discomfort observed during the encounter  Bedside nurse aware of plan of care  See flow sheets for more detailed assessment findings  Orders listed below and wound care will continue to follow, call or tiger text with questions       Skin Care Plan:  1-Cleanse sacro-buttocks with soap and water  Dust wound bed with stoma powder and place calazime cream TID and PRN  2-Turn/reposition q2h or when medically stable for pressure re-distribution on skin   3-Elevate heels to offload pressure  4-Moisturize skin daily with skin nourishing cream  5-Ehob cushion in chair when out of bed  6-Preventative hydraguard to bilateral heels BID and PRN  Wounds:  Wound 11/06/22  Buttocks Left (Active)   Wound Image   11/14/22 0847   Wound Description Beefy red;Pink 11/14/22 0847   Pressure Injury Stage O 11/14/22 0847   Reena-wound Assessment Fragile 11/14/22 0847   Wound Length (cm) 7 cm 11/14/22 0847   Wound Width (cm) 5 cm 11/14/22 0847   Wound Depth (cm) 0 1 cm 11/14/22 0847   Wound Surface Area (cm^2) 35 cm^2 11/14/22 0847   Wound Volume (cm^3) 3 5 cm^3 11/14/22 0847   Calculated Wound Volume (cm^3) 3 5 cm^3 11/14/22 0847   Change in Wound Size % -3400 11/14/22 0847   Wound Site Closure REBECCA 11/14/22 0847   Drainage Amount Scant 11/14/22 0847   Drainage Description Serosanguineous 11/14/22 0847   Non-staged Wound Description Partial thickness 11/14/22 0847   Treatments Cleansed;Site care 11/14/22 0847   Dressing Other (Comment); Protective barrier 11/14/22 0847   Dressing Changed New 11/14/22 0847   Patient Tolerance Tolerated well 11/14/22 0847   Dressing Status Intact 11/14/22 0847       Wound 11/09/22 Pressure Injury Thigh Distal;Posterior;Right (Active)   Wound Image   11/14/22 0848   Wound Description Intact; Pink 11/14/22 0852   Pressure Injury Stage O 11/14/22 0852   Reena-wound Assessment Intact 11/14/22 0852   Wound Length (cm) 0 cm 11/14/22 0852   Wound Width (cm) 0 cm 11/14/22 0852   Wound Depth (cm) 0 cm 11/14/22 0852   Wound Surface Area (cm^2) 0 cm^2 11/14/22 0852   Wound Volume (cm^3) 0 cm^3 11/14/22 0852   Calculated Wound Volume (cm^3) 0 cm^3 11/14/22 0852   Drainage Amount None 11/14/22 0852   Non-staged Wound Description Not applicable 86/23/80 5282   Treatments Cleansed;Site care 11/14/22 4077   Dressing Moisture barrier 11/14/22 0852   Wound packed?  No 11/14/22 0852   Dressing Changed New 11/14/22 0852   Patient Tolerance Tolerated well 11/14/22 0852   Dressing Status Intact 11/14/22 0852       Wound 11/09/22 Skin Tear Scrotum (Active)   Wound Image   11/14/22 0852   Wound Description Intact 11/14/22 0852   Pressure Injury Stage O 11/14/22 0852   Reena-wound Assessment Intact 11/14/22 0852   Treatments Cleansed;Site care 11/14/22 0852   Dressing Open to air 11/14/22 1919 [facility],7Gws, BSN

## 2022-11-14 NOTE — OCCUPATIONAL THERAPY NOTE
Occupational Therapy Progress Note     Patient Name: Jt Daily  TPAVK'G Date: 11/14/2022  Problem List  Active Problems:    SHIMON (generalized anxiety disorder)    Atrial fibrillation (HCC)    Antiphospholipid antibody with hypercoagulable state ([facility] )    Tremor of right hand    CVA (cerebral vascular accident) ([facility] )    Severe protein-calorie malnutrition ([facility] )    Hyperthyroidism    Dehydration    Abdominal distension          11/14/22 0958   OT Last Visit   OT Visit Date 11/14/22   Note Type   Note Type Treatment   Pain Assessment   Pain Assessment Tool [facility] Pain Intervention(s) Repositioned; Ambulation/increased activity   Pain Rating: FLACC (Rest) - Face 0   Pain Rating: FLACC (Rest) - Legs 0   Pain Rating: FLACC (Rest) - Activity 0   Pain Rating: FLACC (Rest) - Cry 0   Pain Rating: FLACC (Rest) - Consolability 0   Score: FLACC (Rest) 0   Pain Rating: FLACC (Activity) - Face 0   Pain Rating: FLACC (Activity) - Legs 0   Pain Rating: FLACC (Activity) - Activity 0   Pain Rating: FLACC (Activity) - Cry 0   Pain Rating: FLACC (Activity) - Consolability 0   Score: FLACC (Activity) 0   Restrictions/Precautions   Other Precautions Cognitive; Chair Alarm; Bed Alarm;Multiple lines; Fall Risk   Lifestyle   Autonomy Per EMR, at baseline pt is I with ADLS and mobility  Reciprocal Relationships Sister   Service to Others Retired   [address] Enjoys "laying with blankets"   ADL   Eating Assistance 2  Maximal Assistance   Eating Deficit Scoop assist;Bringing food to mouth assist;Increased time to complete; Beverage management   Eating Comments MAX A with use of utensils, MOD A to bring pieces to mouth (i e  piece of chopped banana)  Pt is able to grasp fork with RUE  Utilized [place] [employer] [place]   LB Dressing Assistance 1  Total Assistance   LB Dressing Deficit Don/doff R sock; Don/doff L sock   Toileting Assistance  2  Maximal Assistance   Toileting Deficit Perineal hygiene   Bed Mobility   Rolling R 3  Moderate assistance   Additional items Assist x 1;Bedrails; Increased time required   Rolling L Unable to assess   Supine to Sit 2  Maximal assistance   Additional items Assist x 2;HOB elevated; Bedrails; Increased time required;LE management   Sit to Supine Unable to assess   Transfers   Sit to Stand 2  Maximal assistance   Additional items Assist x 2; Increased time required;Verbal cues   Stand to Sit 2  Maximal assistance   Additional items Assist x 2; Increased time required;Verbal cues   Stand pivot 2  Maximal assistance   Additional items Assist x 2; Increased time required   Additional Comments tranfers with B/L HHA   Functional Mobility   Functional Mobility 2  Maximal assistance   Additional Comments Ax2 - pt able to take a few steps fwd/back, does have tendency to scissor feet   Cognition   Overall Cognitive Status Impaired   Arousal/Participation Arousable; Cooperative   Attention Attends with cues to redirect   Orientation Level Oriented to person   Memory Unable to assess   Following Commands Follows one step commands with increased time or repetition   Comments Pt continues with flat affect and blank stare  Pt is cooperative during session but has very limited responsiveness to questions  Increased time for processing   Activity Tolerance   Activity Tolerance Patient limited by fatigue  (cognition)   Medical Staff Made Aware RN clearance for session, PT Margret, SPT Matt   Assessment   Assessment Patient participated in Skilled OT session this date with interventions consisting of ADL re training with the use of correct body mechnaics, Energy Conservation techniques, safety awareness and fall prevention techniques, therapeutic exercise to: increase functional use of BUEs, increase BUE muscle strength ,  therapeutic activities to: increase activity tolerance, increase dynamic sit/ stand balance during functional activity  and increase OOB/ sitting tolerance   Upon arrival patient was found supine in bed   Pt demonstrated the following tasks: MOD A to roll, MAX A x 2 sup to sit, STS, SPT, and fnxl mobility with B/L HHA  Pt performed feeding tasks with MOD to MAX A, utilizing hand over hand technique  Pt is able to grasp utensils with increased cueing  Pt requires MAX A for toileting, DEP for LBD  Pt continues to have flat affect, blank stare, and decreased responsiveness to questions  Patient continues to be functioning below baseline level, occupational performance remains limited secondary to factors listed above and increased risk for falls and injury  From OT standpoint, recommendation at time of d/c would be STR  Patient to benefit from continued Occupational Therapy treatment while in the hospital to address deficits as defined above and maximize level of functional independence with ADLs and functional mobility  Pt was left in chair with alarm on after session with all current needs met  Towel roll placed in L hand to promote finger extension  The patient's raw score on the AM-PAC Daily Activity inpatient short form is 11, standardized score is 29 04, less than 39 4  Patients at this level are likely to benefit from discharge to post-acute rehabilitation services  Please refer to the recommendation of the Occupational Therapist for safe discharge planning  Plan   Treatment Interventions ADL retraining;Functional transfer training;Visual perceptual retraining;UE strengthening/ROM; Cognitive reorientation; Endurance training;Patient/family training;Equipment evaluation/education; Neuromuscular reeducation; Fine motor coordination activities; Compensatory technique education;Continued evaluation; Energy conservation; Activityengagement   Goal Expiration Date 11/21/22   OT Treatment Day 3   OT Frequency 2-3x/wk   Recommendation   OT Discharge Recommendation Post acute rehabilitation services   [facility] Daily Activity Inpatient   Lower Body Dressing 1   Bathing 2   Toileting 2   Upper Body Dressing 2   Grooming 2   Eating 2   Daily Activity Raw Score 11   Daily Activity Standardized Score (Calc for Raw Score >=11) 29 04   AM-PAC Applied Cognition Inpatient   Following a Speech/Presentation 1   Understanding Ordinary Conversation 2   Taking Medications 2   Remembering Where Things Are Placed or Put Away 2   Remembering List of 4-5 Errands 1   Taking Care of Complicated Tasks 1   Applied Cognition Raw Score 9   Applied Cognition Standardized Score 22 48          Sofie Whalen MS, OTR/L

## 2022-11-14 NOTE — PLAN OF CARE
Problem: Potential for Falls  Goal: Patient will remain free of falls  Description: INTERVENTIONS:  - Educate patient/family on patient safety including physical limitations  - Instruct patient to call for assistance with activity   - Consult OT/PT to assist with strengthening/mobility   - Keep Call bell within reach  - Keep bed low and locked with side rails adjusted as appropriate  - Keep care items and personal belongings within reach  - Initiate and maintain comfort rounds  - Make Fall Risk Sign visible to staff  - Offer Toileting every  Hours, in advance of need  - Initiate/Maintain alarm  - Obtain necessary fall risk management equipment:   - Apply yellow socks and bracelet for high fall risk patients  - Consider moving patient to room near nurses station  Outcome: Progressing     Problem: PAIN - ADULT  Goal: Verbalizes/displays adequate comfort level or baseline comfort level  Description: Interventions:  - Encourage patient to monitor pain and request assistance  - Assess pain using appropriate pain scale  - Administer analgesics based on type and severity of pain and evaluate response  - Implement non-pharmacological measures as appropriate and evaluate response  - Consider cultural and social influences on pain and pain management  - Notify physician/advanced practitioner if interventions unsuccessful or patient reports new pain  Outcome: Progressing     Problem: SAFETY ADULT  Goal: Patient will remain free of falls  Description: INTERVENTIONS:  - Educate patient/family on patient safety including physical limitations  - Instruct patient to call for assistance with activity   - Consult OT/PT to assist with strengthening/mobility   - Keep Call bell within reach  - Keep bed low and locked with side rails adjusted as appropriate  - Keep care items and personal belongings within reach  - Initiate and maintain comfort rounds  - Make Fall Risk Sign visible to staff  - Offer Toileting every  Hours, in advance of need  - Initiate/Maintain alarm  - Obtain necessary fall risk management equipment:   - Apply yellow socks and bracelet for high fall risk patients  - Consider moving patient to room near nurses station  Outcome: Progressing  Goal: Maintain or return to baseline ADL function  Description: INTERVENTIONS:  -  Assess patient's ability to carry out ADLs; assess patient's baseline for ADL function and identify physical deficits which impact ability to perform ADLs (bathing, care of mouth/teeth, toileting, grooming, dressing, etc )  - Assess/evaluate cause of self-care deficits   - Assess range of motion  - Assess patient's mobility; develop plan if impaired  - Assess patient's need for assistive devices and provide as appropriate  - Encourage maximum independence but intervene and supervise when necessary  - Involve family in performance of ADLs  - Assess for home care needs following discharge   - Consider OT consult to assist with ADL evaluation and planning for discharge  - Provide patient education as appropriate  Outcome: Progressing  Goal: Maintains/Returns to pre admission functional level  Description: INTERVENTIONS:  - Perform BMAT or MOVE assessment daily    - Set and communicate daily mobility goal to care team and patient/family/caregiver  - Collaborate with rehabilitation services on mobility goals if consulted  - Perform Range of Motion  times a day  - Reposition patient every  hours    - Dangle patient  times a day  - Stand patient  times a day  - Ambulate patient  times a day  - Out of bed to chair  times a day   - Out of bed for meals  times a day  - Out of bed for toileting  - Record patient progress and toleration of activity level   Outcome: Progressing     Problem: MOBILITY - ADULT  Goal: Maintain or return to baseline ADL function  Description: INTERVENTIONS:  -  Assess patient's ability to carry out ADLs; assess patient's baseline for ADL function and identify physical deficits which impact ability to perform ADLs (bathing, care of mouth/teeth, toileting, grooming, dressing, etc )  - Assess/evaluate cause of self-care deficits   - Assess range of motion  - Assess patient's mobility; develop plan if impaired  - Assess patient's need for assistive devices and provide as appropriate  - Encourage maximum independence but intervene and supervise when necessary  - Involve family in performance of ADLs  - Assess for home care needs following discharge   - Consider OT consult to assist with ADL evaluation and planning for discharge  - Provide patient education as appropriate  Outcome: Progressing  Goal: Maintains/Returns to pre admission functional level  Description: INTERVENTIONS:  - Perform BMAT or MOVE assessment daily    - Set and communicate daily mobility goal to care team and patient/family/caregiver  - Collaborate with rehabilitation services on mobility goals if consulted  - Perform Range of Motion  times a day  - Reposition patient every  hours    - Dangle patient  times a day  - Stand patient  times a day  - Ambulate patient  times a day  - Out of bed to chair  times a day   - Out of bed for meals times a day  - Out of bed for toileting  - Record patient progress and toleration of activity level   Outcome: Progressing     Problem: Prexisting or High Potential for Compromised Skin Integrity  Goal: Skin integrity is maintained or improved  Description: INTERVENTIONS:  - Identify patients at risk for skin breakdown  - Assess and monitor skin integrity  - Assess and monitor nutrition and hydration status  - Monitor labs   - Assess for incontinence   - Turn and reposition patient  - Assist with mobility/ambulation  - Relieve pressure over bony prominences  - Avoid friction and shearing  - Provide appropriate hygiene as needed including keeping skin clean and dry  - Evaluate need for skin moisturizer/barrier cream  - Collaborate with interdisciplinary team   - Patient/family teaching  - Consider wound care consult   Outcome: Progressing     Problem: Nutrition/Hydration-ADULT  Goal: Nutrient/Hydration intake appropriate for improving, restoring or maintaining nutritional needs  Description: Monitor and assess patient's nutrition/hydration status for malnutrition  Collaborate with interdisciplinary team and initiate plan and interventions as ordered  Monitor patient's weight and dietary intake as ordered or per policy  Utilize nutrition screening tool and intervene as necessary  Determine patient's food preferences and provide high-protein, high-caloric foods as appropriate       INTERVENTIONS:  - Monitor oral intake, urinary output, labs, and treatment plans  - Assess nutrition and hydration status and recommend course of action  - Evaluate amount of meals eaten  - Assist patient with eating if necessary   - Allow adequate time for meals  - Recommend/ encourage appropriate diets, oral nutritional supplements, and vitamin/mineral supplements  - Order, calculate, and assess calorie counts as needed  - Recommend, monitor, and adjust tube feedings and TPN/PPN based on assessed needs  - Assess need for intravenous fluids  - Provide specific nutrition/hydration education as appropriate  - Include patient/family/caregiver in decisions related to nutrition  Outcome: Progressing

## 2022-11-14 NOTE — ASSESSMENT & PLAN NOTE
Malnutrition Findings:   Adult Malnutrition type: Chronic illness  Adult Degree of Malnutrition: Other severe protein calorie malnutrition  Malnutrition Characteristics: Inadequate energy, Weight loss                  360 Statement: Severe Pro/cathryn malnutrition r/t inadequate intake/anxiety as evidenced by 28% unplanned weight loss since 5/3/22, consuming < 75% energy intake compared to estimated enrgy needs > 1 month  Treatment TBD s/p swallow eval    BMI Findings: Body mass index is 28 92 kg/m²       Po intake improving

## 2022-11-15 LAB
25(OH)D3 SERPL-MCNC: 33.4 NG/ML (ref 30–100)
ANION GAP SERPL CALCULATED.3IONS-SCNC: 3 MMOL/L (ref 4–13)
ANION GAP SERPL CALCULATED.3IONS-SCNC: 5 MMOL/L (ref 4–13)
BUN SERPL-MCNC: 16 MG/DL (ref 5–25)
BUN SERPL-MCNC: 17 MG/DL (ref 5–25)
CALCIUM SERPL-MCNC: 8.4 MG/DL (ref 8.3–10.1)
CALCIUM SERPL-MCNC: 8.6 MG/DL (ref 8.3–10.1)
CHLORIDE SERPL-SCNC: 118 MMOL/L (ref 96–108)
CHLORIDE SERPL-SCNC: 120 MMOL/L (ref 96–108)
CO2 SERPL-SCNC: 24 MMOL/L (ref 21–32)
CO2 SERPL-SCNC: 25 MMOL/L (ref 21–32)
CREAT SERPL-MCNC: 0.8 MG/DL (ref 0.6–1.3)
CREAT SERPL-MCNC: 0.94 MG/DL (ref 0.6–1.3)
GFR SERPL CREATININE-BSD FRML MDRD: 86 ML/MIN/1.73SQ M
GFR SERPL CREATININE-BSD FRML MDRD: 95 ML/MIN/1.73SQ M
GLUCOSE SERPL-MCNC: 108 MG/DL (ref 65–140)
GLUCOSE SERPL-MCNC: 145 MG/DL (ref 65–140)
GLUCOSE SERPL-MCNC: 98 MG/DL (ref 65–140)
INR PPP: 1.09 (ref 0.84–1.19)
POTASSIUM SERPL-SCNC: 3.8 MMOL/L (ref 3.5–5.3)
POTASSIUM SERPL-SCNC: 3.9 MMOL/L (ref 3.5–5.3)
PROTHROMBIN TIME: 14.4 SECONDS (ref 11.6–14.5)
SODIUM SERPL-SCNC: 147 MMOL/L (ref 135–147)
SODIUM SERPL-SCNC: 148 MMOL/L (ref 135–147)

## 2022-11-15 RX ORDER — SENNOSIDES 8.6 MG
2 TABLET ORAL
Status: DISCONTINUED | OUTPATIENT
Start: 2022-11-15 | End: 2022-11-16

## 2022-11-15 RX ADMIN — SALINE NASAL SPRAY 1 SPRAY: 1.5 SOLUTION NASAL at 22:45

## 2022-11-15 RX ADMIN — CARVEDILOL 12.5 MG: 12.5 TABLET, FILM COATED ORAL at 08:11

## 2022-11-15 RX ADMIN — WARFARIN SODIUM 5 MG: 5 TABLET ORAL at 17:32

## 2022-11-15 RX ADMIN — BUSPIRONE HYDROCHLORIDE 5 MG: 5 TABLET ORAL at 08:11

## 2022-11-15 RX ADMIN — SALINE NASAL SPRAY 1 SPRAY: 1.5 SOLUTION NASAL at 17:30

## 2022-11-15 RX ADMIN — ESCITALOPRAM OXALATE 5 MG: 5 TABLET, FILM COATED ORAL at 08:11

## 2022-11-15 RX ADMIN — FLUTICASONE PROPIONATE 1 SPRAY: 50 SPRAY, METERED NASAL at 08:15

## 2022-11-15 RX ADMIN — POTASSIUM CHLORIDE 10 MEQ: 750 TABLET, EXTENDED RELEASE ORAL at 17:30

## 2022-11-15 RX ADMIN — SENNOSIDES 17.2 MG: 8.6 TABLET, FILM COATED ORAL at 11:48

## 2022-11-15 RX ADMIN — CYANOCOBALAMIN TAB 500 MCG 500 MCG: 500 TAB at 08:11

## 2022-11-15 RX ADMIN — ENOXAPARIN SODIUM 90 MG: 100 INJECTION SUBCUTANEOUS at 08:11

## 2022-11-15 RX ADMIN — THIAMINE HCL TAB 100 MG 100 MG: 100 TAB at 08:10

## 2022-11-15 RX ADMIN — FOLIC ACID 1 MG: 1 TABLET ORAL at 08:11

## 2022-11-15 RX ADMIN — ENOXAPARIN SODIUM 90 MG: 100 INJECTION SUBCUTANEOUS at 21:26

## 2022-11-15 RX ADMIN — BUSPIRONE HYDROCHLORIDE 5 MG: 5 TABLET ORAL at 17:32

## 2022-11-15 RX ADMIN — B-COMPLEX W/ C & FOLIC ACID TAB 1 TABLET: TAB at 08:11

## 2022-11-15 RX ADMIN — POTASSIUM CHLORIDE 10 MEQ: 750 TABLET, EXTENDED RELEASE ORAL at 08:10

## 2022-11-15 RX ADMIN — POTASSIUM CHLORIDE 10 MEQ: 750 TABLET, EXTENDED RELEASE ORAL at 11:48

## 2022-11-15 RX ADMIN — SALINE NASAL SPRAY 1 SPRAY: 1.5 SOLUTION NASAL at 11:49

## 2022-11-15 RX ADMIN — CARVEDILOL 12.5 MG: 12.5 TABLET, FILM COATED ORAL at 17:32

## 2022-11-15 NOTE — ASSESSMENT & PLAN NOTE
Per chart review patient has had low TSH and elevated free T4 in the past   Most recent labs during this admission with TSH 0 282 f t4 1 56  thyroid U/S during last admission at [address] [place] [address] was unremarkable   Has seen Endo in the past during previous admission at [address] [place] [address] (oct 22) and they recommend get repeat TFT, TSI and thyroid abs     Endo consulted, appreciate reccs  Repeat thyroid tests in 4-6 weeks

## 2022-11-15 NOTE — PLAN OF CARE
Problem: Potential for Falls  Goal: Patient will remain free of falls  Description: INTERVENTIONS:  - Educate patient/family on patient safety including physical limitations  - Instruct patient to call for assistance with activity   - Consult OT/PT to assist with strengthening/mobility   - Keep Call bell within reach  - Keep bed low and locked with side rails adjusted as appropriate  - Keep care items and personal belongings within reach  - Initiate and maintain comfort rounds  - Make Fall Risk Sign visible to staff  - Offer Toileting every  Hours, in advance of need  - Initiate/Maintain alarm  - Obtain necessary fall risk management equipment:   - Apply yellow socks and bracelet for high fall risk patients  - Consider moving patient to room near nurses station  Outcome: Not Progressing     Problem: PAIN - ADULT  Goal: Verbalizes/displays adequate comfort level or baseline comfort level  Description: Interventions:  - Encourage patient to monitor pain and request assistance  - Assess pain using appropriate pain scale  - Administer analgesics based on type and severity of pain and evaluate response  - Implement non-pharmacological measures as appropriate and evaluate response  - Consider cultural and social influences on pain and pain management  - Notify physician/advanced practitioner if interventions unsuccessful or patient reports new pain  Outcome: Not Progressing     Problem: SAFETY ADULT  Goal: Patient will remain free of falls  Description: INTERVENTIONS:  - Educate patient/family on patient safety including physical limitations  - Instruct patient to call for assistance with activity   - Consult OT/PT to assist with strengthening/mobility   - Keep Call bell within reach  - Keep bed low and locked with side rails adjusted as appropriate  - Keep care items and personal belongings within reach  - Initiate and maintain comfort rounds  - Make Fall Risk Sign visible to staff  - Offer Toileting every  Hours, in advance of need  - Initiate/Maintain alarm  - Obtain necessary fall risk management equipment:   - Apply yellow socks and bracelet for high fall risk patients  - Consider moving patient to room near nurses station  Outcome: Not Progressing  Goal: Maintain or return to baseline ADL function  Description: INTERVENTIONS:  -  Assess patient's ability to carry out ADLs; assess patient's baseline for ADL function and identify physical deficits which impact ability to perform ADLs (bathing, care of mouth/teeth, toileting, grooming, dressing, etc )  - Assess/evaluate cause of self-care deficits   - Assess range of motion  - Assess patient's mobility; develop plan if impaired  - Assess patient's need for assistive devices and provide as appropriate  - Encourage maximum independence but intervene and supervise when necessary  - Involve family in performance of ADLs  - Assess for home care needs following discharge   - Consider OT consult to assist with ADL evaluation and planning for discharge  - Provide patient education as appropriate  Outcome: Not Progressing  Goal: Maintains/Returns to pre admission functional level  Description: INTERVENTIONS:  - Perform BMAT or MOVE assessment daily    - Set and communicate daily mobility goal to care team and patient/family/caregiver  - Collaborate with rehabilitation services on mobility goals if consulted  - Perform Range of Motion  times a day  - Reposition patient every  hours    - Dangle patient  times a day  - Stand patient  times a day  - Ambulate patient  times a day  - Out of bed to chair  times a day   - Out of bed for meals  times a day  - Out of bed for toileting  - Record patient progress and toleration of activity level   Outcome: Not Progressing     Problem: MOBILITY - ADULT  Goal: Maintain or return to baseline ADL function  Description: INTERVENTIONS:  -  Assess patient's ability to carry out ADLs; assess patient's baseline for ADL function and identify physical deficits which impact ability to perform ADLs (bathing, care of mouth/teeth, toileting, grooming, dressing, etc )  - Assess/evaluate cause of self-care deficits   - Assess range of motion  - Assess patient's mobility; develop plan if impaired  - Assess patient's need for assistive devices and provide as appropriate  - Encourage maximum independence but intervene and supervise when necessary  - Involve family in performance of ADLs  - Assess for home care needs following discharge   - Consider OT consult to assist with ADL evaluation and planning for discharge  - Provide patient education as appropriate  Outcome: Not Progressing  Goal: Maintains/Returns to pre admission functional level  Description: INTERVENTIONS:  - Perform BMAT or MOVE assessment daily    - Set and communicate daily mobility goal to care team and patient/family/caregiver  - Collaborate with rehabilitation services on mobility goals if consulted  - Perform Range of Motion  times a day  - Reposition patient every  hours    - Dangle patient  times a day  - Stand patient  times a day  - Ambulate patient  times a day  - Out of bed to chair  times a day   - Out of bed for meals times a day  - Out of bed for toileting  - Record patient progress and toleration of activity level   Outcome: Not Progressing     Problem: Prexisting or High Potential for Compromised Skin Integrity  Goal: Skin integrity is maintained or improved  Description: INTERVENTIONS:  - Identify patients at risk for skin breakdown  - Assess and monitor skin integrity  - Assess and monitor nutrition and hydration status  - Monitor labs   - Assess for incontinence   - Turn and reposition patient  - Assist with mobility/ambulation  - Relieve pressure over bony prominences  - Avoid friction and shearing  - Provide appropriate hygiene as needed including keeping skin clean and dry  - Evaluate need for skin moisturizer/barrier cream  - Collaborate with interdisciplinary team   - Patient/family teaching  - Consider wound care consult   Outcome: Not Progressing     Problem: Nutrition/Hydration-ADULT  Goal: Nutrient/Hydration intake appropriate for improving, restoring or maintaining nutritional needs  Description: Monitor and assess patient's nutrition/hydration status for malnutrition  Collaborate with interdisciplinary team and initiate plan and interventions as ordered  Monitor patient's weight and dietary intake as ordered or per policy  Utilize nutrition screening tool and intervene as necessary  Determine patient's food preferences and provide high-protein, high-caloric foods as appropriate       INTERVENTIONS:  - Monitor oral intake, urinary output, labs, and treatment plans  - Assess nutrition and hydration status and recommend course of action  - Evaluate amount of meals eaten  - Assist patient with eating if necessary   - Allow adequate time for meals  - Recommend/ encourage appropriate diets, oral nutritional supplements, and vitamin/mineral supplements  - Order, calculate, and assess calorie counts as needed  - Recommend, monitor, and adjust tube feedings and TPN/PPN based on assessed needs  - Assess need for intravenous fluids  - Provide specific nutrition/hydration education as appropriate  - Include patient/family/caregiver in decisions related to nutrition  Outcome: Not Progressing

## 2022-11-15 NOTE — ASSESSMENT & PLAN NOTE
· Outpatient follow-up with Neurology  No inpatient intervention as per Neurology  Will follow-up with Dr Lorena Everett as outpatient  Patient known to him for prior stroke

## 2022-11-15 NOTE — PROGRESS NOTES
[address] [facility]  Progress Note - Chriss Riding 1960, 58 y o  male MRN: [id number]  Unit/Bed#: [facility] 907-01 Encounter: [id number]  Primary Care Provider: Pippa Rubio MD   Date and time admitted to hospital: 11/3/2022  3:28 PM    Atrial fibrillation [facility])  Assessment & Plan  Will target INR between 2 5 in 3 5 for hypercoagulable state  Continue lovenox to warfarin bridge  Continue coreg 12 5 BID    Impaired decision making  Assessment & Plan  Per Neuropsych eval 11/14 patient does NOT have capacity to make medical decisions   Patient also has some level of cognitive impairment   B12 borderline low   · B12 IM shot x 1 dose   · Start b12 supplements    Dehydration  Assessment & Plan  Due to poor po intake, decreased appetite, possible constipation or fecal impaction  Improved now  Monitor off IV hydration  Monitor PO intake - patient has been eating all his meals     SHIMON (generalized anxiety disorder)  Assessment & Plan  · Previous admission from the end of September until the end of October at [facility] in which he was hospitalized for over a month for severe anxiety and inability to care for self  · Per sister reportedly was able to ambulate prior to the hospitalization but after the hospitalization patient needing assistance with ambulation at home  · During hospitalization there patient developed hyponatremia during his course of stay and Zoloft that had been started was stopped due to hyponatremia  · PRN lorazepam ordered   · Recently discharged from inpatient psychiatry with mirtazapine and aripiprazole, discussed resumption of thesewith the patient but he reports excess sedation with these  · D/w psychiatry earlier this week started buspar 5mg BID, and lexapro  · He does admit to depressive symptoms and anxiety, discussing certain topics with him result is worsening tremor  · Monitor response to antidepressants    Hyperthyroidism  Assessment & Plan  Per chart review patient has had low TSH and elevated free T4 in the past   Most recent labs during this admission with TSH 0 282 f t4 1 56  thyroid U/S during last admission at [facility] was unremarkable   Has seen Endo in the past during previous admission at [facility] (oct 22) and they recommend get repeat TFT, TSI and thyroid abs     Endo consulted, appreciate reccs  Repeat thyroid tests in 4-6 weeks     Abdominal distension  Assessment & Plan  · Possibly the cause of his poor oral intake  · Reports improved distention and had a large BM recently  · No signs of obstruction on CT abdomen  · Regular diet, monitor for tolerance    Severe protein-calorie malnutrition (HCC)  Assessment & Plan  Malnutrition Findings:   Adult Malnutrition type: Chronic illness  Adult Degree of Malnutrition: Other severe protein calorie malnutrition  Malnutrition Characteristics: Inadequate energy, Weight loss     360 Statement: Severe Pro/cathryn malnutrition r/t inadequate intake/anxiety as evidenced by 28% unplanned weight loss since 5/3/22, consuming < 75% energy intake compared to estimated enrgy needs > 1 month  Treatment TBD s/p swallow eval    BMI Findings: Body mass index is 28 92 kg/m²  Po intake improving     CVA (cerebral vascular accident) ([address] )  Assessment & Plan  -Hx CVA x 3 in setting of antiphospholipid syndrome  -on PTA warfarin although noted supratherapeutic INR  -CT head in ER: No acute intracranial abnormality  Encephalomalacia involving the right occipital lobe and bilateral cerebelli  Grossly stable appearance of periventricular hypoattenuation compared to MRI brain 8/29/2022, likely representing chronic microvascular ischemic changes  PT/ ot consulted and recommending rehab    Tremor of right hand  Assessment & Plan  · Outpatient follow-up with Neurology  No inpatient intervention as per Neurology  Will follow-up with Dr Jenaro Vasquez as outpatient  Patient known to him for prior stroke      Antiphospholipid antibody with hypercoagulable [facility]  Assessment & Plan  -history of antiphospholipid antibody on PTA Coumadin  -had ischemic CVA despite being on Eliquis in the past  -restarting warfarin as noted above  -inr is subtherapeutic, will continue bridge with lovenox    VTE Pharmacologic Prophylaxis: VTE Score: 3 Moderate Risk (Score 3-4) - Pharmacological DVT Prophylaxis Ordered: warfarin (Coumadin)  Patient Centered Rounds: I performed bedside rounds with nursing staff today  Discussions with Specialists or Other Care Team Provider: JORGE    Education and Discussions with Family / Patient: Updated  (sister) via phone  Time Spent for Care: 20 minutes  More than 50% of total time spent on counseling and coordination of care as described above  Current Length of Stay: 12 day(s)  Current Patient Status: Inpatient   Certification Statement: The patient will continue to require additional inpatient hospital stay due to pending placement  Discharge Plan: Anticipate discharge tomorrow to rehab facility  Code Status: Level 3 - DNAR and DNI    Subjective: Ate 100% breakfast and 100% of lunch today  Still reports feeling down and "trapped"  Objective:     Vitals:   Temp (24hrs), Av 9 °F (36 6 °C), Min:97 3 °F (36 3 °C), Max:98 3 °F (36 8 °C)    Temp:  [97 3 °F (36 3 °C)-98 3 °F (36 8 °C)] 97 9 °F (36 6 °C)  HR:  [] 94  Resp:  [18-20] 19  BP: (124-137)/(83-87) 137/87  SpO2:  [94 %-98 %] 97 %  Body mass index is 28 92 kg/m²  Input and Output Summary (last 24 hours): Intake/Output Summary (Last 24 hours) at 11/15/2022 1503  Last data filed at 11/15/2022 1357  Gross per 24 hour   Intake 1283 ml   Output 325 ml   Net 958 ml       Physical Exam:   Physical Exam  Vitals and nursing note reviewed  Constitutional:       Appearance: He is well-developed and overweight  HENT:      Head: Normocephalic and atraumatic     Eyes:      Conjunctiva/sclera: Conjunctivae normal       Pupils: Pupils are equal, round, and reactive to light  Cardiovascular:      Rate and Rhythm: Normal rate  Rhythm irregular  Heart sounds: No murmur heard  Pulmonary:      Effort: Pulmonary effort is normal  No respiratory distress  Breath sounds: Normal breath sounds  Abdominal:      General: Bowel sounds are normal       Palpations: Abdomen is soft  Tenderness: There is no abdominal tenderness  Musculoskeletal:      Cervical back: Neck supple  Right lower leg: No edema  Left lower leg: No edema  Skin:     General: Skin is warm and dry  Neurological:      Mental Status: He is alert  Mental status is at baseline  Motor: Tremor present  Psychiatric:         Mood and Affect: Affect is flat  Speech: Speech is delayed  Cognition and Memory: Cognition is impaired  Additional Data:     Labs:  Results from last 7 days   Lab Units 11/14/22  0518 11/13/22  0511 11/10/22  0528   WBC Thousand/uL 6 64   < > 7 82   HEMOGLOBIN g/dL 11 7*   < > 11 5*   HEMATOCRIT % 37 0   < > 35 5*   PLATELETS Thousands/uL 154   < > 164   NEUTROS PCT %  --   --  79*   LYMPHS PCT %  --   --  13*   MONOS PCT %  --   --  7   EOS PCT %  --   --  0    < > = values in this interval not displayed  Results from last 7 days   Lab Units 11/15/22  1250 11/12/22  0439 11/11/22  0527   SODIUM mmol/L 147   < > 149*   POTASSIUM mmol/L 3 9   < > 3 4*   CHLORIDE mmol/L 120*   < > 119*   CO2 mmol/L 24   < > 27   BUN mg/dL 16   < > 24   CREATININE mg/dL 0 94   < > 1 10   ANION GAP mmol/L 3*   < > 3*   CALCIUM mg/dL 8 6   < > 8 5   ALBUMIN g/dL  --   --  2 2*   TOTAL BILIRUBIN mg/dL  --   --  1 03*   ALK PHOS U/L  --   --  71   ALT U/L  --   --  52   AST U/L  --   --  37   GLUCOSE RANDOM mg/dL 145*   < > 126    < > = values in this interval not displayed       Results from last 7 days   Lab Units 11/15/22  0505   INR  1 09     Results from last 7 days   Lab Units 11/15/22  0801 11/09/22  1648 11/09/22  1111 11/09/22  7487 11/08/22  1611 POC GLUCOSE mg/dl 98 100 95 83 90             Lines/Drains:  Invasive Devices  Report    Peripheral Intravenous Line  Duration           Peripheral IV 11/13/22 Dorsal (posterior); Left Forearm 2 days              Imaging: Reviewed radiology reports from this admission including: xray(s)    Recent Cultures (last 7 days):         Last 24 Hours Medication List:   Current Facility-Administered Medications   Medication Dose Route Frequency Provider Last Rate   • busPIRone  5 mg Oral BID Gio Mckenzie MD     • carvedilol  12 5 mg Oral BID With Meals Jasmit Kaylie, DO     • vitamin B-12  500 mcg Oral Daily Jasmit Kaylie, DO     • cyanocobalamin  1,000 mcg Intramuscular Q30 Days Jasmit Kaylie, DO     • enoxaparin  1 mg/kg Subcutaneous Q12H [facility] Gio Mckenzie MD     • escitalopram  5 mg Oral Daily Jasmit Kaylie, DO     • fluticasone  1 spray Each Nare Daily Jasmit Kaylie, DO     • folic acid  1 mg Oral Daily Gio Mckenzie MD     • LORazepam  0 5 mg Oral Q8H PRN Gio Mckenzie MD     • multivitamin stress formula  1 tablet Oral Daily Gio Mckenzie MD     • pantoprazole  40 mg Oral Early Morning Jasmit Kaylie, DO     • potassium chloride  10 mEq Oral TID With Meals Gio Mckenzie MD     • senna  2 tablet Oral HS Jasmit Kaylie, DO     • sodium chloride  1 spray Each Nare Q6H Jasmit Kaylie, DO     • thiamine  100 mg Oral Daily Gio Mckenzie MD     • warfarin  5 mg Oral Daily (warfarin) Gio Mckenzie MD          Today, Patient Was Seen By: Felipe Barrera    **Please Note: This note may have been constructed using a voice recognition system  **

## 2022-11-15 NOTE — ASSESSMENT & PLAN NOTE
· Previous admission from the end of September until the end of October at [facility] in which he was hospitalized for over a month for severe anxiety and inability to care for self  · Per sister reportedly was able to ambulate prior to the hospitalization but after the hospitalization patient needing assistance with ambulation at home  · During hospitalization there patient developed hyponatremia during his course of stay and Zoloft that had been started was stopped due to hyponatremia  · PRN lorazepam ordered   · Recently discharged from inpatient psychiatry with mirtazapine and aripiprazole, discussed resumption of thesewith the patient but he reports excess sedation with these  · D/w psychiatry earlier this week started buspar 5mg BID, and lexapro  · He does admit to depressive symptoms and anxiety, discussing certain topics with him result is worsening tremor  · Monitor response to antidepressants

## 2022-11-15 NOTE — UTILIZATION REVIEW
Continued Stay Review    Date: 11/15/22                          Current Patient Class: inpatient  Current Level of Care: med surg  HPI:62 y o  male initially admitted on 11/3/22     Assessment/Plan:   SHIMON, possible depression, AF, APLS, prior CVA  Reports feeling down & trapped  Speech delayed, cognition impaired  Persistent tremors noted  Per Neuropsych eval 11/14 patient does not have capacity to make medical decisions   Continue to monitor response to antidepressants, psyche following    Vital Signs:   Date/Time Temp Pulse Resp BP MAP (mmHg) SpO2 O2 Device   11/15/22 14:48:03 97 9 °F (36 6 °C) 94 -- 137/87 104 97 % --   11/15/22 14:47:44 97 9 °F (36 6 °C) 95 -- 137/87 104 94 % --   11/15/22 0811 -- -- -- -- -- 97 % None (Room air)   11/15/22 08:09:53 98 3 °F (36 8 °C) 98 19 134/87 103 97 % None (Room air)     Pertinent Labs/Diagnostic Results:     Results from last 7 days   Lab Units 11/14/22  0518 11/13/22  0511 11/10/22  0528 11/09/22  0544   WBC Thousand/uL 6 64 9 78 7 82 9 64   HEMOGLOBIN g/dL 11 7* 12 1 11 5* 11 8*   HEMATOCRIT % 37 0 37 0 35 5* 35 3*   PLATELETS Thousands/uL 154 172 164 154   NEUTROS ABS Thousands/µL  --   --  6 15 7 87*     Results from last 7 days   Lab Units 11/15/22  0505 11/14/22  0518 11/13/22  0511 11/12/22  0439 11/11/22  0527 11/10/22  0528 11/09/22  0544   SODIUM mmol/L 148* 146 141 145 149* 149* 147   POTASSIUM mmol/L 3 8 4 1 4 0 3 7 3 4* 3 6 3 2*   CHLORIDE mmol/L 118* 117* 114* 117* 119* 118* 114*   CO2 mmol/L 25 22 25 27 27 24 27   ANION GAP mmol/L 5 7 2* 1* 3* 7 6   BUN mg/dL 17 15 13 16 24 30* 25   CREATININE mg/dL 0 80 0 85 0 80 0 96 1 10 1 05 1 07   EGFR ml/min/1 73sq m 95 93 95 84 71 75 73   CALCIUM mg/dL 8 4 8 7 8 8 8 4 8 5 9 0 8 7   MAGNESIUM mg/dL  --  2 4  --  2 3  --  2 4 2 4   PHOSPHORUS mg/dL  --  3 5  --  2 8  --  2 8 2 8     Results from last 7 days   Lab Units 11/11/22  0527   AST U/L 37   ALT U/L 52   ALK PHOS U/L 71   TOTAL PROTEIN g/dL 5 2*   ALBUMIN g/dL 2 2*   TOTAL BILIRUBIN mg/dL 1 03*     Results from last 7 days   Lab Units 11/15/22  0801 11/09/22  1648 11/09/22  1111 11/09/22  0621 11/08/22  1611   POC GLUCOSE mg/dl 98 100 95 83 90     Results from last 7 days   Lab Units 11/15/22  0505 11/14/22  0518 11/13/22  0511 11/12/22  0439 11/11/22  0527 11/10/22  0528 11/09/22  0544   GLUCOSE RANDOM mg/dL 108 98 117 129 126 97 100     Results from last 7 days   Lab Units 11/10/22  0528   CK TOTAL U/L 120     Results from last 7 days   Lab Units 11/15/22  0505 11/14/22  1230 11/12/22  0439   PROTIME seconds 14 4 14 1 15 3*   INR  1 09 1 07 1 19     Results from last 7 days   Lab Units 11/10/22  0528   TSH 3RD GENERATON uIU/mL 0 753      Ref Range & Units 11/15/22 0505   Vit D, 25-Hydroxy 30 0 - 100 0 ng/mL 33 4          Ref Range & Units 11/13/22 0511   Vitamin B-12 100 - 900 pg/mL 393      Medications:   Scheduled Medications:  busPIRone, 5 mg, Oral, BID  carvedilol, 12 5 mg, Oral, BID With Meals  vitamin B-12, 500 mcg, Oral, Daily  cyanocobalamin, 1,000 mcg, Intramuscular, Q30 Days  enoxaparin, 1 mg/kg, Subcutaneous, Q12H DARLENE  escitalopram, 5 mg, Oral, Daily  fluticasone, 1 spray, Each Nare, Daily  folic acid, 1 mg, Oral, Daily  multivitamin stress formula, 1 tablet, Oral, Daily  pantoprazole, 40 mg, Oral, Early Morning  potassium chloride, 10 mEq, Oral, TID With Meals  senna, 2 tablet, Oral, HS  sodium chloride, 1 spray, Each Nare, Q6H  thiamine, 100 mg, Oral, Daily  warfarin, 5 mg, Oral, Daily (warfarin)    PRN Meds:  LORazepam, 0 5 mg, Oral, Q8H PRN    Discharge Plan: tbd    Network Utilization Review Department  ATTENTION: Please call with any questions or concerns to [phone] and carefully listen to the prompts so that you are directed to the right person   All voicemails are confidential   Raul Garcia all requests for admission clinical reviews, approved or denied determinations and any other requests to dedicated fax number below belonging to the campus where the patient is receiving treatment   List of dedicated fax numbers for the Facilities:  [address] [place] [address] DENIALS (Administrative/Medical Necessity) [phone]   [address]  (Maternity/NICU/Pediatrics) [phone]   [address] [phone]   [address] [phone]   [address] [address] [address] [phone]   [address] [facility] [address] [facility] [address] [facility] [phone]

## 2022-11-15 NOTE — CASE MANAGEMENT
Case Management Discharge Planning Note    Patient name [address]/PPHP [id number] MRN [id number]  : 1960 Date 11/15/2022       Current Admission Date: 11/3/2022  Current Admission Diagnosis:Atrial fibrillation [facility])   Patient Active Problem List    Diagnosis Date Noted   • Impaired decision making 2022   • Abdominal distension 2022   • Dehydration 2022   • Hyperthyroidism 11/10/2022   • Severe protein-calorie malnutrition ([address] ) 2022   • Hyponatremia 10/14/2022   • SIADH (syndrome of inappropriate ADH production) ([facility] ) 10/14/2022   • Subclinical hyperthyroidism 10/14/2022   • Sinus arrhythmia 10/10/2022   • Mild protein-calorie malnutrition ([facility] ) 10/07/2022   • Medical clearance for incarceration 2022   • 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 attack 2022   • Atypical chest pain 2022   • Hypokalemia 2022   • Dizziness 2022   • Renal cyst 2022   • PVD (peripheral vascular disease) ([facility] ) 2022   • Ataxia 2022   • CVA (cerebral vascular accident) ([facility] ) 2022   • Right inguinal pain 2022   • Alkaline phosphatase elevation 2022   • Low back pain 2022   • S/P laparoscopic hernia repair 2021   • Non-recurrent bilateral inguinal hernia without obstruction or gangrene 2021   • Gastric polyps 2021   • Gastric AVM 2021   • Edema of both lower legs 2021   • Tremor of right hand 2021   • Weakness of both lower extremities 2021   • Anticoagulated on Coumadin 2021   • CORIE (obstructive sleep apnea)    • Hyperbilirubinemia 08/10/2020   • Antiphospholipid antibody with hypercoagulable state ([facility] ) 2020   • History of stroke 2020   • Other viral warts 2019   • Physical deconditioning 2019   • Class 2 obesity in adult 2018   • Bright red blood per rectum 11/10/2017   • Numbness 2017   • H/O aortic aneurysm repair 08/26/2017   • Hypertension 08/26/2017   • GERD (gastroesophageal reflux disease) 08/26/2017   • Hyperlipidemia 03/17/2017   • Peyronie disease 12/09/2016   • Vitamin D deficiency 06/15/2016   • Atrial fibrillation ([address] ) 11/17/2014   • SHIMON (generalized anxiety disorder) 11/11/2014   • Constipation 09/19/2014   • Irritable bowel syndrome 04/24/2014   • Kidney stones 04/24/2014      LOS (days): 12  Geometric Mean LOS (GMLOS) (days): 5 00  Days to GMLOS:-6 7     OBJECTIVE:  Risk of Unplanned Readmission Score: 32 23         Current admission status: Inpatient   Preferred Pharmacy:   [facility]/pharmacy [address]  Phone: [phone] Fax: [phone]    Primary Care Provider: Jatin Gutierrez MD    Primary Insurance: [address] [place] [address] 108:     DISCHARGE DETAILS:         Other Referral/Resources/Interventions Provided:  Referral Comments: [facility] was able to take the patient this morning, however they know need the optioning determination back prior to being able to accept the patient  CM placed call to Michi ([phone])  Unfortunately, they are stating that they no longer have the paperwork and are requesting that all of the information be resent  At this time all information has been sent to [email or web address]

## 2022-11-15 NOTE — DISCHARGE INSTR - AVS FIRST PAGE
Patient is on lovenox warfarin bridge   Goal INR is 2 5-3 5   Please continue lovenox and warfarin as prescribed and recheck INR tomorrow

## 2022-11-16 PROBLEM — R14.0 ABDOMINAL DISTENSION: Status: RESOLVED | Noted: 2022-01-01 | Resolved: 2022-01-01

## 2022-11-16 PROBLEM — E86.0 DEHYDRATION: Status: RESOLVED | Noted: 2022-01-01 | Resolved: 2022-01-01

## 2022-11-16 LAB
ANION GAP SERPL CALCULATED.3IONS-SCNC: 7 MMOL/L (ref 4–13)
BUN SERPL-MCNC: 14 MG/DL (ref 5–25)
CALCIUM SERPL-MCNC: 8.4 MG/DL (ref 8.3–10.1)
CHLORIDE SERPL-SCNC: 117 MMOL/L (ref 96–108)
CO2 SERPL-SCNC: 23 MMOL/L (ref 21–32)
CREAT SERPL-MCNC: 0.74 MG/DL (ref 0.6–1.3)
GFR SERPL CREATININE-BSD FRML MDRD: 98 ML/MIN/1.73SQ M
GLUCOSE SERPL-MCNC: 99 MG/DL (ref 65–140)
INR PPP: 0.98 (ref 0.84–1.19)
POTASSIUM SERPL-SCNC: 3.6 MMOL/L (ref 3.5–5.3)
PROTHROMBIN TIME: 13.2 SECONDS (ref 11.6–14.5)
SODIUM SERPL-SCNC: 147 MMOL/L (ref 135–147)

## 2022-11-16 RX ORDER — SENNOSIDES 8.6 MG
1 TABLET ORAL
Status: DISCONTINUED | OUTPATIENT
Start: 2022-11-16 | End: 2022-11-17

## 2022-11-16 RX ORDER — AMLODIPINE BESYLATE 2.5 MG/1
2.5 TABLET ORAL DAILY
Status: DISCONTINUED | OUTPATIENT
Start: 2022-11-16 | End: 2022-11-17

## 2022-11-16 RX ADMIN — BUSPIRONE HYDROCHLORIDE 5 MG: 5 TABLET ORAL at 09:28

## 2022-11-16 RX ADMIN — SALINE NASAL SPRAY 1 SPRAY: 1.5 SOLUTION NASAL at 12:51

## 2022-11-16 RX ADMIN — THIAMINE HCL TAB 100 MG 100 MG: 100 TAB at 09:27

## 2022-11-16 RX ADMIN — SALINE NASAL SPRAY 1 SPRAY: 1.5 SOLUTION NASAL at 05:07

## 2022-11-16 RX ADMIN — FLUTICASONE PROPIONATE 1 SPRAY: 50 SPRAY, METERED NASAL at 09:37

## 2022-11-16 RX ADMIN — ESCITALOPRAM OXALATE 5 MG: 5 TABLET, FILM COATED ORAL at 09:27

## 2022-11-16 RX ADMIN — ENOXAPARIN SODIUM 90 MG: 100 INJECTION SUBCUTANEOUS at 09:38

## 2022-11-16 RX ADMIN — CARVEDILOL 12.5 MG: 12.5 TABLET, FILM COATED ORAL at 09:27

## 2022-11-16 RX ADMIN — POTASSIUM CHLORIDE 10 MEQ: 750 TABLET, EXTENDED RELEASE ORAL at 12:49

## 2022-11-16 RX ADMIN — CYANOCOBALAMIN TAB 500 MCG 500 MCG: 500 TAB at 09:27

## 2022-11-16 RX ADMIN — ENOXAPARIN SODIUM 90 MG: 100 INJECTION SUBCUTANEOUS at 21:32

## 2022-11-16 RX ADMIN — B-COMPLEX W/ C & FOLIC ACID TAB 1 TABLET: TAB at 09:28

## 2022-11-16 RX ADMIN — POTASSIUM CHLORIDE 10 MEQ: 750 TABLET, EXTENDED RELEASE ORAL at 09:27

## 2022-11-16 RX ADMIN — AMLODIPINE BESYLATE 2.5 MG: 2.5 TABLET ORAL at 09:28

## 2022-11-16 RX ADMIN — FOLIC ACID 1 MG: 1 TABLET ORAL at 09:28

## 2022-11-16 NOTE — QUICK NOTE
Quick Note:  - pt will need hematology follow up outpatient  -  He was set up for follow up 11/29; this was canceled  We will arrange for follow up about 2-3 weeks after discharge from hospital    - Hematology/Oncology will sign off at this point  If you have any questions or concerns in the future, please do not hesitate to reach out to us       Doyne Soulier, FNP-BC  Hematology/Oncology Nurse Practitioner

## 2022-11-16 NOTE — PHYSICAL THERAPY NOTE
PHYSICAL THERAPY NOTE          Patient Name: Teagan VELAZQUEZ Date: 11/16/2022 11/16/22 1019   PT Last Visit   PT Visit Date 11/16/22   Note Type   Note Type Treatment   Pain Assessment   Pain Assessment Tool 0-10   Pain Score 2   Pain Location/Orientation Orientation: Right;Location: Leg;Orientation: Bilateral;Location: Chest   Hospital Pain Intervention(s) Repositioned; Ambulation/increased activity; Emotional support   Restrictions/Precautions   Weight Bearing Precautions Per Order No   Other Precautions Cognitive; Chair Alarm; Bed Alarm;Multiple lines; Fall Risk;Pain   General   Chart Reviewed Yes   Response to Previous Treatment Patient with no complaints from previous session  Family/Caregiver Present No   Cognition   Overall Cognitive Status Impaired   Arousal/Participation Arousable; Cooperative   Attention Attends with cues to redirect   Orientation Level Oriented to person;Oriented to place;Oriented to time   Memory Unable to assess   Following Commands Follows one step commands with increased time or repetition   Comments pt with flat affect, much more alert and interactive compared to previous session   Bed Mobility   Supine to Sit 2  Maximal assistance   Additional items Assist x 2; Increased time required;LE management;Verbal cues   Sit to Supine Unable to assess   Additional Comments pt supine in bed upon arrival  Pt sitting OOB in recliner with alarm on and all needs wihtin reach at the end of therapy session  (updated RN slide back to bed)   Transfers   Sit to Stand 2  Maximal assistance   Additional items Assist x 2; Increased time required;Verbal cues   Stand to Sit 2  Maximal assistance   Additional items Assist x 2; Increased time required;Verbal cues   Additional Comments transfers with b/l HHA vs RW; transfers to drop arm bedside chair   Ambulation/Elevation   Gait pattern Excessively slow; Short stride; Foward flexed;Decreased foot clearance; Improper Weight shift;Knees flexed   Gait Assistance 2  Maximal assist   Additional items Assist x 2;Verbal cues; Tactile cues   Assistive Device   (b/l HHA)   Distance 3ft to bedside chair   Balance   Static Sitting Fair   Dynamic Sitting Fair -   Static Standing Poor -   Dynamic Standing Poor -   Ambulatory Poor -   Endurance Deficit   Endurance Deficit Yes   Endurance Deficit Description generalized weakness, deconditioning   Activity Tolerance   Activity Tolerance Patient tolerated treatment well  (co-treatment with OT 2* increased medical complexity and multiple co-morbidities)   Nurse Made Aware RN cleared pt to participate in therapy session   Exercises   Hip Abduction Sitting;Bilateral;10 reps;AAROM   Hip Adduction Sitting;Bilateral;10 reps;AAROM   Knee AROM Long Arc Quad Sitting;Bilateral;10 reps;AAROM   Ankle Pumps Sitting;Bilateral;10 reps;AAROM   Marching Sitting;Bilateral;10 reps;AAROM   Balance training  static/ dynamic balance EOB ~12-14 min while OT performing bathing/ dressing tasks  Pt requiring min A for R lateral lean, progresses to close S level   Assessment   Prognosis Fair   Problem List Decreased strength;Decreased range of motion;Decreased endurance; Impaired balance;Decreased mobility; Decreased coordination;Decreased cognition;Decreased safety awareness;Pain   Assessment Pt seen for PT treatment session this date  Therapy session focused on bed mobility, sitting tolerance/ balance, functional transfers, gait training and endurance training in order to improve overall mobility and independence  Pt requires assist of 2 for all mobility performed this date  Pt continues to require max A for bed mobility, functional transfers and ambulation  Improvements in static/ dynamic sitting balance observed this date  Pt performed/ tolerated seated therex program to b/l LEs, increased cuing required to maintain pt attention to task at hand   Pt making slow progress toward goals  Pt was left sitting OOB in recliner at the end of PT session with all needs in reach  Pt would benefit from continued PT services while in hospital to address remaining limitations  PT to continue to follow pt and recommends rehab upon d/c  The patient's AM-PAC Basic Mobility Inpatient Short Form Raw Score is 7  A Raw score of less than or equal to 16 suggests the patient may benefit from discharge to post-acute rehabilitation services  Please also refer to the recommendation of the Physical Therapist for safe discharge planning  Barriers to Discharge Inaccessible home environment   Goals   Patient Goals none stated   STG Expiration Date 11/28/22   PT Treatment Day 4   Plan   Treatment/Interventions ADL retraining;Functional transfer training;LE strengthening/ROM; Endurance training;Patient/family training;Bed mobility;Gait training;Spoke to nursing;Spoke to case management;OT   Progress Slow progress, decreased activity tolerance   PT Frequency 2-3x/wk  (decrease POC, as pt with decreased activity tolerance)   Recommendation   PT Discharge Recommendation Post acute rehabilitation services   Equipment Recommended [address] Mobility Inpatient   Turning in Bed Without Bedrails 2   Lying on Back to Sitting on Edge of Flat Bed 1   Moving Bed to Chair 1   Standing Up From Chair 1   Walk in Room 1   Climb 3-5 Stairs 1   Basic Mobility Inpatient Raw Score 7   Turning Head Towards Sound 3   Follow Simple Instructions 3   Low Function Basic Mobility Raw Score 13   Low Function Basic Mobility Standardized Score 20 14   Highest Level Of Mobility   JH-HLM Goal 2: Bed activities/Dependent transfer   JH-HLM Achieved 4: Move to chair/commode   Education   Education Provided Mobility training;Home exercise program;Assistive device   Patient Reinforcement needed   End of Consult   Patient Position at End of Consult Bedside chair;Bed/Chair alarm activated; All needs within reach     Henok Corner, PT, DPT

## 2022-11-16 NOTE — PLAN OF CARE
Problem: PHYSICAL THERAPY ADULT  Goal: Performs mobility at highest level of function for planned discharge setting  See evaluation for individualized goals  Description: Treatment/Interventions: LE strengthening/ROM, Functional transfer training, Endurance training, Patient/family training, Bed mobility, Cognitive reorientation, Therapeutic exercise          See flowsheet documentation for full assessment, interventions and recommendations  Outcome: Progressing  Note: Prognosis: Fair  Problem List: Decreased strength, Decreased range of motion, Decreased endurance, Impaired balance, Decreased mobility, Decreased coordination, Decreased cognition, Decreased safety awareness, Pain  Assessment: Pt seen for PT treatment session this date  Therapy session focused on bed mobility, sitting tolerance/ balance, functional transfers, gait training and endurance training in order to improve overall mobility and independence  Pt requires assist of 2 for all mobility performed this date  Pt continues to require max A for bed mobility, functional transfers and ambulation  Improvements in static/ dynamic sitting balance observed this date  Pt performed/ tolerated seated therex program to b/l LEs, increased cuing required to maintain pt attention to task at hand  Pt making slow progress toward goals  Pt was left sitting OOB in recliner at the end of PT session with all needs in reach  Pt would benefit from continued PT services while in hospital to address remaining limitations  PT to continue to follow pt and recommends rehab upon d/c  The patient's AM-PAC Basic Mobility Inpatient Short Form Raw Score is 7  A Raw score of less than or equal to 16 suggests the patient may benefit from discharge to post-acute rehabilitation services  Please also refer to the recommendation of the Physical Therapist for safe discharge planning    Barriers to Discharge: Inaccessible home environment     PT Discharge Recommendation: Post acute rehabilitation services    See flowsheet documentation for full assessment

## 2022-11-16 NOTE — ASSESSMENT & PLAN NOTE
· Outpatient follow-up with Neurology  No inpatient intervention as per Neurology  Will follow-up with Dr Caty Garcia as outpatient  Patient known to him for prior stroke

## 2022-11-16 NOTE — ASSESSMENT & PLAN NOTE
Per chart review patient has had low TSH and elevated free T4 in the past   Most recent labs during this admission with TSH 0 282 f t4 1 56  thyroid U/S during last admission at [address] [place] [address] was unremarkable   Has seen Endo in the past during previous admission at [address] [place] [address] (oct 22) and they recommend get repeat TFT, TSI and thyroid abs     Endo consulted, appreciate reccs  Repeat thyroid tests in 4-6 weeks

## 2022-11-16 NOTE — ASSESSMENT & PLAN NOTE
· Previous admission from the end of September until the end of October at [facility] in which he was hospitalized for over a month for severe anxiety and inability to care for self  · Per sister reportedly was able to ambulate prior to the hospitalization but after the hospitalization patient needing assistance with ambulation at home  · During hospitalization there patient developed hyponatremia during his course of stay and Zoloft that had been started was stopped due to hyponatremia  · PRN lorazepam ordered   · Recently discharged from inpatient psychiatry with mirtazapine and aripiprazole, discussed resumption of thesewith the patient but he reports excess sedation with these  · D/w psychiatry earlier this week started buspar 5mg BID, and lexapro  · He does admit to depressive symptoms and anxiety, discussing certain topics with him result is worsening tremor  · Monitor response to antidepressants

## 2022-11-16 NOTE — OCCUPATIONAL THERAPY NOTE
Occupational Therapy Progress Note     Patient Name: Shoshana Silverio  [id number] Date: 11/16/2022  Problem List  Active Problems:    SHIMON (generalized anxiety disorder)    Atrial fibrillation (HCC)    Antiphospholipid antibody with hypercoagulable state ([facility] )    Tremor of right hand    CVA (cerebral vascular accident) ([facility] )    Severe protein-calorie malnutrition ([facility] )    Hyperthyroidism    Impaired decision making          11/16/22 1020   OT Last Visit   OT Visit Date 11/16/22   Note Type   Note Type Treatment   Pain Assessment   Pain Assessment Tool 0-10   Pain Score 2   Pain Location/Orientation Location: Chest;Orientation: Right;Location: Leg   Hospital Pain Intervention(s) Repositioned; Ambulation/increased activity; Emotional support   Restrictions/Precautions   Other Precautions Cognitive; Chair Alarm; Bed Alarm; Fall Risk;Pain   Lifestyle   Autonomy Per EMR, at baseline pt is I with ADLS and mobility  Reciprocal Relationships Sister   Service to Others Retired   Intrinsic Gratification Pt reports he likes to paint model cars   ADL   Where Assessed Edge of bed   Grooming Assistance 4  Minimal Assistance   Grooming Deficit Brushing hair;Wash/dry face   Grooming Comments + washing hair with shampoo cap  Increased A for thoroughness   UB Bathing Assistance 3  Moderate Assistance   UB Bathing Deficit Chest;Right arm;Left arm; Abdomen   LB Bathing Assistance 2  Maximal Assistance   LB Bathing Deficit Perineal area; Buttocks;Right lower leg including foot; Left lower leg including foot   UB Dressing Assistance 3  Moderate Assistance   UB Dressing Deficit Thread RUE; Thread LUE   LB Dressing Assistance 2  Maximal Assistance   LB Dressing Deficit Don/doff R sock; Don/doff L sock   Toileting Assistance  2  Maximal Assistance   Toileting Deficit Perineal hygiene   Bed Mobility   Supine to Sit 2  Maximal assistance   Additional items Assist x 2;HOB elevated; Bedrails; Increased time required;LE management   Sit to Supine Unable to assess   Transfers   Sit to Stand 2  Maximal assistance   Additional items Assist x 2; Increased time required;Verbal cues   Stand to Sit 2  Maximal assistance   Additional items Assist x 2; Increased time required;Verbal cues   Additional Comments x1 STS with B/L HHA, x1 STS with RW   Functional Mobility   Functional Mobility 2  Maximal assistance   Additional Comments Ax2   Additional items Hand hold assistance   Subjective   Subjective "I'm trapped in the closet"   Cognition   Overall Cognitive Status Impaired   Arousal/Participation Arousable; Cooperative   Attention Attends with cues to redirect   Orientation Level Oriented to person;Oriented to place;Oriented to time  (oriented to month and year)   Memory Unable to assess   Following Commands Follows one step commands with increased time or repetition   Comments Pt much more alert and responsive in comparison to previous session  Pt able to follow simple, one-step commands with increased time and prompting  Pt able to answer questions and respond more frequently  Does continue with flat affect and blank stare   Activity Tolerance   Activity Tolerance Patient limited by fatigue;Patient limited by pain   Medical Staff Made Aware PT Pritesh Schmid, RN clearance for session   Assessment   Assessment Patient participated in Skilled OT session this date with interventions consisting of ADL re training with the use of correct body mechnaics, Energy Conservation techniques, safety awareness and fall prevention techniques,  therapeutic activities to: increase activity tolerance, increase dynamic sit/ stand balance during functional activity  and increase OOB/ sitting tolerance   Upon arrival patient was found supine in bed  Pt demonstrated the following tasks: MAX A x 2 sup to sit, STS, and fnxl mobility  Pt requires MIN A for grooming, MOD A for UB ADLs, MAX A for LB ADLs, and MAX A for toileting   Pt much more alert and responsive in comparison to previous sessions but does require increased time and prompting  Patient continues to be functioning below baseline level, occupational performance remains limited secondary to factors listed above and increased risk for falls and injury  From OT standpoint, recommendation at time of d/c would be STR  Patient to benefit from continued Occupational Therapy treatment while in the hospital to address deficits as defined above and maximize level of functional independence with ADLs and functional mobility  Pt was left in chair with alarm on after session with all current needs met  The patient's raw score on the AM-PAC Daily Activity inpatient short form is 14, standardized score is 33 39, less than 39 4  Patients at this level are likely to benefit from discharge to post-acute rehabilitation services  Please refer to the recommendation of the Occupational Therapist for safe discharge planning  Plan   Treatment Interventions ADL retraining;Functional transfer training;UE strengthening/ROM; Endurance training;Visual perceptual retraining;Cognitive reorientation;Patient/family training;Equipment evaluation/education; Compensatory technique education; Neuromuscular reeducation; Fine motor coordination activities;Continued evaluation; Energy conservation; Activityengagement   Goal Expiration Date 11/21/22   OT Treatment Day 4   OT Frequency 2-3x/wk   Recommendation   OT Discharge Recommendation Post acute rehabilitation services   AM-PAC Daily Activity Inpatient   Lower Body Dressing 2   Bathing 2   Toileting 2   Upper Body Dressing 2   Grooming 3   Eating 3   Daily Activity Raw Score 14   Daily Activity Standardized Score (Calc for Raw Score >=11) 33 39   AM-PAC Applied Cognition Inpatient   Following a Speech/Presentation 2   Understanding Ordinary Conversation 3   Taking Medications 3   Remembering Where Things Are Placed or Put Away 2   Remembering List of 4-5 Errands 1   Taking Care of Complicated Tasks 1   Applied Cognition Raw Score 12 Applied Cognition Standardized Score 28 82        Maritza Palumbo, MS, OTR/L

## 2022-11-16 NOTE — PROGRESS NOTES
[address] [facility]  Progress Note - Anali Urbina 1960, 58 y o  male MRN: [id number]  [facility] Encounter: [id number]  Primary Care Provider: Galilea Shelton MD   Date and time admitted to hospital: 11/3/2022  3:28 PM    Atrial fibrillation [facility])  Assessment & Plan  Will target INR between 2 5 in 3 5 for hypercoagulable state  Continue lovenox to warfarin bridge  Continue coreg 12 5 BID    Impaired decision making  Assessment & Plan  Per Neuropsych eval 11/14 patient does NOT have capacity to make medical decisions   Patient also has some level of cognitive impairment   B12 borderline low   · B12 IM shot x 1 dose   · Start b12 supplements    Dehydration-resolved as of 11/16/2022  Assessment & Plan  Due to poor po intake, decreased appetite, possible constipation or fecal impaction  Improved now  Monitor off IV hydration  Monitor PO intake - patient has been eating all his meals     SHIMON (generalized anxiety disorder)  Assessment & Plan  · Previous admission from the end of September until the end of October at [facility] in which he was hospitalized for over a month for severe anxiety and inability to care for self  · Per sister reportedly was able to ambulate prior to the hospitalization but after the hospitalization patient needing assistance with ambulation at home  · During hospitalization there patient developed hyponatremia during his course of stay and Zoloft that had been started was stopped due to hyponatremia  · PRN lorazepam ordered   · Recently discharged from inpatient psychiatry with mirtazapine and aripiprazole, discussed resumption of thesewith the patient but he reports excess sedation with these  · D/w psychiatry earlier this week started buspar 5mg BID, and lexapro  · He does admit to depressive symptoms and anxiety, discussing certain topics with him result is worsening tremor  · Monitor response to antidepressants    Hypertension  Assessment & Plan  Blood pressure is elevated  · Restart Norvasc 2 5    Hyperthyroidism  Assessment & Plan  Per chart review patient has had low TSH and elevated free T4 in the past   Most recent labs during this admission with TSH 0 282 f t4 1 56  thyroid U/S during last admission at [address] [place] [id number] was unremarkable   Has seen Endo in the past during previous admission at [address] [place] [id number] (oct 22) and they recommend get repeat TFT, TSI and thyroid abs     Endo consulted, appreciate reccs  Repeat thyroid tests in 4-6 weeks     Severe protein-calorie malnutrition ([facility] [address] )  Assessment & Plan  Malnutrition Findings:   Adult Malnutrition type: Chronic illness  Adult Degree of Malnutrition: Other severe protein calorie malnutrition  Malnutrition Characteristics: Inadequate energy, Weight loss     360 Statement: Severe Pro/cathryn malnutrition r/t inadequate intake/anxiety as evidenced by 28% unplanned weight loss since 5/3/22, consuming < 75% energy intake compared to estimated enrgy needs > 1 month  Treatment TBD s/p swallow eval    BMI Findings: Body mass index is 28 92 kg/m²  Po intake improving     CVA (cerebral vascular accident) ([facility] [address] )  Assessment & Plan  -Hx CVA x 3 in setting of antiphospholipid syndrome  -on PTA warfarin although noted supratherapeutic INR  -CT head in ER: No acute intracranial abnormality  Encephalomalacia involving the right occipital lobe and bilateral cerebelli  Grossly stable appearance of periventricular hypoattenuation compared to MRI brain 8/29/2022, likely representing chronic microvascular ischemic changes  PT/ ot consulted and recommending rehab    Tremor of right hand  Assessment & Plan  · Outpatient follow-up with Neurology  No inpatient intervention as per Neurology  Will follow-up with Dr Susy Roy as outpatient  Patient known to him for prior stroke  Abdominal distension-resolved as of 11/16/2022  Assessment & Plan  · Possibly the cause of his poor oral intake     · Reports improved distention and had a large BM recently  · No signs of obstruction on CT abdomen  · Regular diet, monitor for tolerance    VTE Pharmacologic Prophylaxis: VTE Score: 3 Moderate Risk (Score 3-4) - Pharmacological DVT Prophylaxis Ordered: warfarin (Coumadin)  Patient Centered Rounds: I performed bedside rounds with nursing staff today  Discussions with Specialists or Other Care Team Provider:  Case management    Education and Discussions with Family / Patient: Updated  (sister) via phone  -AMISHA    Time Spent for Care: 20 minutes  More than 50% of total time spent on counseling and coordination of care as described above  Current Length of Stay: 13 day(s)  Current Patient Status: Inpatient   Certification Statement: The patient will continue to require additional inpatient hospital stay due to Pending placement to rehab  Discharge Plan: Anticipate discharge later today or tomorrow to rehab facility  Code Status: Level 3 - DNAR and DNI    Subjective:   No overnight events  Patient seen and examined at bedside  Ate 50% breakfast today  Frequent bowel movements  Abdominal bloating improved    Objective:     Vitals:   Temp (24hrs), Av °F (36 7 °C), Min:97 9 °F (36 6 °C), Max:98 1 °F (36 7 °C)    Temp:  [97 9 °F (36 6 °C)-98 1 °F (36 7 °C)] 98 1 °F (36 7 °C)  HR:  [90-98] 98  Resp:  [16] 16  BP: (135-158)/(84-93) 154/93  SpO2:  [94 %-97 %] 96 %  Body mass index is 28 92 kg/m²  Input and Output Summary (last 24 hours): Intake/Output Summary (Last 24 hours) at 2022 1325  Last data filed at 2022 1300  Gross per 24 hour   Intake 1425 ml   Output 700 ml   Net 725 ml       Physical Exam:   Physical Exam  Vitals and nursing note reviewed  Constitutional:       General: He is not in acute distress  Appearance: He is well-developed  HENT:      Head: Normocephalic and atraumatic  Eyes:      Extraocular Movements: Extraocular movements intact        Conjunctiva/sclera: Conjunctivae normal       Pupils: Pupils are equal, round, and reactive to light  Cardiovascular:      Rate and Rhythm: Normal rate and regular rhythm  Heart sounds: No murmur heard  Pulmonary:      Effort: Pulmonary effort is normal  No respiratory distress  Breath sounds: Normal breath sounds  Abdominal:      General: Bowel sounds are normal       Palpations: Abdomen is soft  Tenderness: There is no abdominal tenderness  Musculoskeletal:         General: No swelling  Cervical back: Neck supple  Right lower leg: No edema  Left lower leg: No edema  Skin:     General: Skin is warm and dry  Capillary Refill: Capillary refill takes less than 2 seconds  Neurological:      Mental Status: He is alert  Mental status is at baseline  Psychiatric:         Mood and Affect: Mood normal         Additional Data:     Labs:  Results from last 7 days   Lab Units 11/14/22  0518 11/13/22  0511 11/10/22  0528   WBC Thousand/uL 6 64   < > 7 82   HEMOGLOBIN g/dL 11 7*   < > 11 5*   HEMATOCRIT % 37 0   < > 35 5*   PLATELETS Thousands/uL 154   < > 164   NEUTROS PCT %  --   --  79*   LYMPHS PCT %  --   --  13*   MONOS PCT %  --   --  7   EOS PCT %  --   --  0    < > = values in this interval not displayed  Results from last 7 days   Lab Units 11/16/22  0514 11/12/22  0439 11/11/22  0527   SODIUM mmol/L 147   < > 149*   POTASSIUM mmol/L 3 6   < > 3 4*   CHLORIDE mmol/L 117*   < > 119*   CO2 mmol/L 23   < > 27   BUN mg/dL 14   < > 24   CREATININE mg/dL 0 74   < > 1 10   ANION GAP mmol/L 7   < > 3*   CALCIUM mg/dL 8 4   < > 8 5   ALBUMIN g/dL  --   --  2 2*   TOTAL BILIRUBIN mg/dL  --   --  1 03*   ALK PHOS U/L  --   --  71   ALT U/L  --   --  52   AST U/L  --   --  37   GLUCOSE RANDOM mg/dL 99   < > 126    < > = values in this interval not displayed       Results from last 7 days   Lab Units 11/16/22  0514   INR  0 98     Results from last 7 days   Lab Units 11/15/22  0801 11/09/22  1648   POC GLUCOSE mg/dl 98 100 Lines/Drains:  Invasive Devices     Peripheral Intravenous Line  Duration           Peripheral IV 11/13/22 Dorsal (posterior); Left Forearm 3 days          Drain  Duration           External Urinary Catheter <1 day              Imaging: Reviewed radiology reports from this admission including: xray(s)    Recent Cultures (last 7 days):       Last 24 Hours Medication List:   Current Facility-Administered Medications   Medication Dose Route Frequency Provider Last Rate   • amLODIPine  2 5 mg Oral Daily Sandra Watkins DO     • busPIRone  5 mg Oral BID Kee Riddle MD     • carvedilol  12 5 mg Oral BID With Meals Jasmit Kaylie, DO     • vitamin B-12  500 mcg Oral Daily Jasmit Kaylie, DO     • cyanocobalamin  1,000 mcg Intramuscular Q30 Days Jasmit Kaylie, DO     • enoxaparin  1 mg/kg Subcutaneous Q12H Ramona Walter MD     • escitalopram  5 mg Oral Daily Jasmit Kaylie, DO     • fluticasone  1 spray Each Nare Daily Jasmit Kaylie, DO     • folic acid  1 mg Oral Daily Kee Riddle MD     • LORazepam  0 5 mg Oral Q8H PRN Kee Riddle MD     • multivitamin stress formula  1 tablet Oral Daily Kee Riddle MD     • pantoprazole  40 mg Oral Early Morning Jasmit Kaylie, DO     • potassium chloride  10 mEq Oral TID With Meals Kee Riddle MD     • senna  2 tablet Oral HS Jasmit Kaylie, DO     • sodium chloride  1 spray Each Nare Q6H Jasmit Kaylie, DO     • thiamine  100 mg Oral Daily Kee Riddle MD     • warfarin  5 mg Oral Daily (warfarin) Kee Riddle MD          Today, Patient Was Seen By: Sandra Watkins    **Please Note: This note may have been constructed using a voice recognition system  **

## 2022-11-16 NOTE — RESTORATIVE TECHNICIAN NOTE
Restorative Technician Note      Patient Name: Johnny Tubbs     Restorative Tech Visit Date: 11/16/22  Note Type: Bracing, Initial consult  Patient Position Upon Consult: Bedside chair  Brace Applied: Multipodous Boot (Applied bilateral multipodus boots)  Additional Brace Ordered: No  Patient Position When Brace Applied: Supine  Bracing Recommendations: None  Education Provided: Yes  Patient Position at End of Consult: Bedside chair;  All needs within reach; Bed/Chair alarm activated  Nurse Communication: Nurse aware of consult, application of brace        Vira Castillo  Restorative Technician

## 2022-11-16 NOTE — PLAN OF CARE
Problem: OCCUPATIONAL THERAPY ADULT  Goal: Performs self-care activities at highest level of function for planned discharge setting  See evaluation for individualized goals  Description: Treatment Interventions: ADL retraining, Functional transfer training, UE strengthening/ROM, Endurance training, Patient/family training, Cognitive reorientation, Equipment evaluation/education, Compensatory technique education, Continued evaluation, Energy conservation, Activityengagement          See flowsheet documentation for full assessment, interventions and recommendations  Outcome: Progressing  Note: Limitation: Decreased ADL status, Decreased endurance, Decreased cognition, Decreased self-care trans, Decreased high-level ADLs  Prognosis: Fair  Assessment: Patient participated in Skilled OT session this date with interventions consisting of ADL re training with the use of correct body mechnaics, Energy Conservation techniques, safety awareness and fall prevention techniques,  therapeutic activities to: increase activity tolerance, increase dynamic sit/ stand balance during functional activity  and increase OOB/ sitting tolerance   Upon arrival patient was found supine in bed  Pt demonstrated the following tasks: MAX A x 2 sup to sit, STS, and fnxl mobility  Pt requires MIN A for grooming, MOD A for UB ADLs, MAX A for LB ADLs, and MAX A for toileting  Pt much more alert and responsive in comparison to previous sessions but does require increased time and prompting  Patient continues to be functioning below baseline level, occupational performance remains limited secondary to factors listed above and increased risk for falls and injury  From OT standpoint, recommendation at time of d/c would be STR  Patient to benefit from continued Occupational Therapy treatment while in the hospital to address deficits as defined above and maximize level of functional independence with ADLs and functional mobility   Pt was left in chair with alarm on after session with all current needs met  The patient's raw score on the AM-PAC Daily Activity inpatient short form is 14, standardized score is 33 39, less than 39 4  Patients at this level are likely to benefit from discharge to post-acute rehabilitation services  Please refer to the recommendation of the Occupational Therapist for safe discharge planning       OT Discharge Recommendation: Post acute rehabilitation services

## 2022-11-17 ENCOUNTER — VBI (OUTPATIENT)
Dept: ADMINISTRATIVE | Facility: OTHER | Age: 62
End: 2022-11-17

## 2022-11-17 LAB
INR PPP: 1.18 (ref 0.84–1.19)
PROTHROMBIN TIME: 15.2 SECONDS (ref 11.6–14.5)

## 2022-11-17 RX ORDER — WARFARIN SODIUM 5 MG/1
5 TABLET ORAL EVERY MORNING
Status: DISCONTINUED | OUTPATIENT
Start: 2022-11-18 | End: 2022-11-28

## 2022-11-17 RX ORDER — METOPROLOL TARTRATE 5 MG/5ML
2.5 INJECTION INTRAVENOUS ONCE
Status: COMPLETED | OUTPATIENT
Start: 2022-11-17 | End: 2022-11-17

## 2022-11-17 RX ORDER — ECHINACEA PURPUREA EXTRACT 125 MG
1 TABLET ORAL
Status: DISCONTINUED | OUTPATIENT
Start: 2022-11-17 | End: 2022-12-09 | Stop reason: HOSPADM

## 2022-11-17 RX ORDER — SENNOSIDES 8.6 MG
1 TABLET ORAL
Status: DISCONTINUED | OUTPATIENT
Start: 2022-11-17 | End: 2022-11-24

## 2022-11-17 RX ORDER — METOPROLOL SUCCINATE 50 MG/1
50 TABLET, EXTENDED RELEASE ORAL DAILY
Status: DISCONTINUED | OUTPATIENT
Start: 2022-11-17 | End: 2022-11-20

## 2022-11-17 RX ADMIN — THIAMINE HCL TAB 100 MG 100 MG: 100 TAB at 09:42

## 2022-11-17 RX ADMIN — FLUTICASONE PROPIONATE 1 SPRAY: 50 SPRAY, METERED NASAL at 09:52

## 2022-11-17 RX ADMIN — FOLIC ACID 1 MG: 1 TABLET ORAL at 09:42

## 2022-11-17 RX ADMIN — ENOXAPARIN SODIUM 90 MG: 100 INJECTION SUBCUTANEOUS at 22:38

## 2022-11-17 RX ADMIN — ENOXAPARIN SODIUM 90 MG: 100 INJECTION SUBCUTANEOUS at 09:52

## 2022-11-17 RX ADMIN — METOROPROLOL TARTRATE 2.5 MG: 5 INJECTION, SOLUTION INTRAVENOUS at 22:54

## 2022-11-17 RX ADMIN — B-COMPLEX W/ C & FOLIC ACID TAB 1 TABLET: TAB at 09:43

## 2022-11-17 RX ADMIN — CYANOCOBALAMIN TAB 500 MCG 500 MCG: 500 TAB at 09:42

## 2022-11-17 RX ADMIN — SALINE NASAL SPRAY 1 SPRAY: 1.5 SOLUTION NASAL at 09:54

## 2022-11-17 RX ADMIN — BUSPIRONE HYDROCHLORIDE 5 MG: 5 TABLET ORAL at 09:42

## 2022-11-17 RX ADMIN — ESCITALOPRAM OXALATE 5 MG: 5 TABLET, FILM COATED ORAL at 09:42

## 2022-11-17 RX ADMIN — POTASSIUM CHLORIDE 10 MEQ: 750 TABLET, EXTENDED RELEASE ORAL at 09:41

## 2022-11-17 NOTE — ASSESSMENT & PLAN NOTE
· Previous admission from the end of September until the end of October at [facility] in which he was hospitalized for over a month for severe anxiety and inability to care for self  · Per sister reportedly was able to ambulate prior to the hospitalization but after the hospitalization patient needing assistance with ambulation at home  · During hospitalization there patient developed hyponatremia during his course of stay and Zoloft that had been started was stopped due to hyponatremia  · PRN lorazepam ordered   · Recently discharged from inpatient psychiatry with mirtazapine and aripiprazole, discussed resumption of thesewith the patient but he reports excess sedation with these  · D/w psychiatry earlier this week started buspar 5mg BID, and lexapro  · He does admit to depressive symptoms and anxiety, discussing certain topics with him result is worsening tremor  · Monitor response to antidepressants

## 2022-11-17 NOTE — ASSESSMENT & PLAN NOTE
· Outpatient follow-up with Neurology  No inpatient intervention as per Neurology  Will follow-up with Dr Sharee Nieto as outpatient  Patient known to him for prior stroke

## 2022-11-17 NOTE — PHYSICAL THERAPY NOTE
PHYSICAL THERAPY NOTE          Patient Name: Pelon Briscoe  HQUBF'E Date: 11/17/2022 11/17/22 1106   PT Last Visit   PT Visit Date 11/17/22   Note Type   Note Type Treatment   Pain Assessment   Pain Assessment Tool FLACC   Pain Score No Pain   Pain Rating: FLACC (Rest) - Face 0   Pain Rating: FLACC (Rest) - Legs 0   Pain Rating: FLACC (Rest) - Activity 0   Pain Rating: FLACC (Rest) - Cry 0   Pain Rating: FLACC (Rest) - Consolability 0   Score: FLACC (Rest) 0   Pain Rating: FLACC (Activity) - Face 0   Pain Rating: FLACC (Activity) - Legs 0   Pain Rating: FLACC (Activity) - Activity 0   Pain Rating: FLACC (Activity) - Cry 0   Pain Rating: FLACC (Activity) - Consolability 0   Score: FLACC (Activity) 0   Restrictions/Precautions   Weight Bearing Precautions Per Order No   Other Precautions Cognitive; Chair Alarm; Bed Alarm;Multiple lines; Fall Risk   General   Chart Reviewed Yes   Response to Previous Treatment Patient unable to report, no changes reported from family or staff   Family/Caregiver Present No   Cognition   Overall Cognitive Status Impaired   Arousal/Participation Arousable; Cooperative   Attention Attends with cues to redirect   Orientation Level Unable to assess   Memory Unable to assess   Following Commands Follows one step commands with increased time or repetition   Comments pt alert with eyes open, blank stare at times, decreased verbalizatoins  Increased time + frequent cues to complete all tasks   Bed Mobility   Supine to Sit Unable to assess   Sit to Supine Unable to assess   Additional Comments pt sitting OOB in recliner upon arrival  Pt returned to bedside chair with alarm on and all needs wihtin reach at the end of therapy session   Transfers   Sit to Stand 3  Moderate assistance   Additional items Assist x 2; Increased time required;Verbal cues   Stand to Sit 3  Moderate assistance   Additional items Assist x 2;Increased time required;Verbal cues   Additional Comments initial STS max Ax2, progresses to mod A x2 using RW for support  Pt with elevated HR with standing 120-130, further mobility deferred   Ambulation/Elevation   Gait pattern Not tested   Balance   Static Sitting Fair   Dynamic Sitting Fair -   Static Standing Poor   Dynamic Standing Poor   Endurance Deficit   Endurance Deficit Yes   Activity Tolerance   Activity Tolerance Other (Comment); Patient limited by fatigue  (cognition)   Nurse Made Aware RN cleared pt to participate in therapy session  (co-treatment with OT 2* increased medical complexity and multiple co-morbidiites)   Exercises   Balance training  static standing with RW x 2 trials ~60seconds   Assessment   Prognosis Fair   Problem List Decreased strength;Decreased range of motion;Decreased endurance; Impaired balance;Decreased mobility; Decreased coordination;Decreased cognition;Decreased safety awareness;Pain   Assessment Pt seen for PT treatment session this date  Therapy session focused on functional transfers, endurance training in order to improve overall mobility and independence  Pt requires assist of 2 for all mobility performed this date; seen as co-treatment with OT 2* increased medical complexity and multiple co-morbidities  Pt making slow progress toward goals 2* cognition and decreased activity tolerance  Pt progressing to mod Ax2 to complete STS to RW; able to maintain standing ~1 min however pt with tachycardia noted  Pt was left sitting OOB in recliner at the end of PT session with all needs in reach  Pt would benefit from continued PT services while in hospital to address remaining limitations  PT to continue to follow pt and recommends rehab upon d/c  The patient's AM-PAC Basic Mobility Inpatient Short Form Raw Score is 7  A Raw score of less than or equal to 16 suggests the patient may benefit from discharge to post-acute rehabilitation services   Please also refer to the recommendation of the Physical Therapist for safe discharge planning  Barriers to Discharge Inaccessible home environment   Goals   Patient Goals none stated   STG Expiration Date 11/28/22   PT Treatment Day 5   Plan   Treatment/Interventions ADL retraining;Functional transfer training; Endurance training;Cognitive reorientation;Patient/family training;Spoke to nursing;Spoke to case management;OT   Progress Slow progress, cognitive deficits   PT Frequency 2-3x/wk   Recommendation   PT Discharge Recommendation Post acute rehabilitation services   AM-PAC Basic Mobility Inpatient   Turning in Bed Without Bedrails 2   Lying on Back to Sitting on Edge of Flat Bed 1   Moving Bed to Chair 1   Standing Up From Chair 1   Walk in Room 1   Climb 3-5 Stairs 1   Basic Mobility Inpatient Raw Score 7   Highest Level Of Mobility   JH-HLM Goal 2: Bed activities/Dependent transfer   JH-HLM Achieved 5: Stand (1 or more minutes)   Education   Education Provided Mobility training;Assistive device   Patient Reinforcement needed   End of Consult   Patient Position at End of Consult Bedside chair;Bed/Chair alarm activated; All needs within reach     Candace Wilde, PT, DPT

## 2022-11-17 NOTE — PLAN OF CARE
Problem: PHYSICAL THERAPY ADULT  Goal: Performs mobility at highest level of function for planned discharge setting  See evaluation for individualized goals  Description: Treatment/Interventions: LE strengthening/ROM, Functional transfer training, Endurance training, Patient/family training, Bed mobility, Cognitive reorientation, Therapeutic exercise          See flowsheet documentation for full assessment, interventions and recommendations  11/17/2022 1239 by Chalo Baldwin PT  Note: Prognosis: Fair  Problem List: Decreased strength, Decreased range of motion, Decreased endurance, Impaired balance, Decreased mobility, Decreased coordination, Decreased cognition, Decreased safety awareness, Pain  Assessment: Pt seen for PT treatment session this date  Therapy session focused on functional transfers, endurance training in order to improve overall mobility and independence  Pt requires assist of 2 for all mobility performed this date; seen as co-treatment with OT 2* increased medical complexity and multiple co-morbidities  Pt making slow progress toward goals 2* cognition and decreased activity tolerance  Pt progressing to mod Ax2 to complete STS to RW; able to maintain standing ~1 min however pt with tachycardia noted  Pt was left sitting OOB in recliner at the end of PT session with all needs in reach  Pt would benefit from continued PT services while in hospital to address remaining limitations  PT to continue to follow pt and recommends rehab upon d/c  The patient's AM-PAC Basic Mobility Inpatient Short Form Raw Score is 7  A Raw score of less than or equal to 16 suggests the patient may benefit from discharge to post-acute rehabilitation services  Please also refer to the recommendation of the Physical Therapist for safe discharge planning    Barriers to Discharge: Inaccessible home environment     PT Discharge Recommendation: Post acute rehabilitation services    See flowsheet documentation for full assessment  11/17/2022 1239 by Lisa Oropeza, PT  Note: Prognosis: Fair  Problem List: Decreased strength, Decreased range of motion, Decreased endurance, Impaired balance, Decreased mobility, Decreased coordination, Decreased cognition, Decreased safety awareness, Pain  Assessment: Pt seen for PT treatment session this date  Therapy session focused on functional transfers, endurance training in order to improve overall mobility and independence  Pt requires assist of 2 for all mobility performed this date; seen as co-treatment with OT 2* increased medical complexity and multiple co-morbidities  Pt making slow progress toward goals 2* cognition and decreased activity tolerance  Pt progressing to mod Ax2 to complete STS to RW; able to maintain standing ~1 min however pt with tachycardia noted  Pt was left sitting OOB in recliner at the end of PT session with all needs in reach  Pt would benefit from continued PT services while in hospital to address remaining limitations  PT to continue to follow pt and recommends rehab upon d/c  The patient's AM-PAC Basic Mobility Inpatient Short Form Raw Score is 7  A Raw score of less than or equal to 16 suggests the patient may benefit from discharge to post-acute rehabilitation services  Please also refer to the recommendation of the Physical Therapist for safe discharge planning  Barriers to Discharge: Inaccessible home environment     PT Discharge Recommendation: Post acute rehabilitation services    See flowsheet documentation for full assessment

## 2022-11-17 NOTE — ASSESSMENT & PLAN NOTE
Malnutrition Findings:   Adult Malnutrition type: Chronic illness  Adult Degree of Malnutrition: Other severe protein calorie malnutrition  Malnutrition Characteristics: Inadequate energy, Weight loss                  360 Statement: Severe Pro/cathryn malnutrition r/t inadequate intake/anxiety as evidenced by 28% unplanned weight loss since 5/3/22, consuming < 75% energy intake compared to estimated enrgy needs > 1 month  Treatment TBD s/p swallow eval    BMI Findings: Body mass index is 29 09 kg/m²       Po intake improving

## 2022-11-17 NOTE — OCCUPATIONAL THERAPY NOTE
Occupational Therapy Progress Note     Patient Name: Luther JOSEPHUHE'X Date: 11/17/2022  Problem List  Active Problems:    SHIMON (generalized anxiety disorder)    Atrial fibrillation (HCC)    Antiphospholipid antibody with hypercoagulable state ([address] )    Tremor of right hand    CVA (cerebral vascular accident) ([facility] )    Severe protein-calorie malnutrition ([address] )    Hyperthyroidism    Impaired decision making        11/17/22 1050   OT Last Visit   OT Visit Date 11/17/22   Note Type   Note Type Treatment   Pain Assessment   Pain Assessment Tool FLACC   Pain Rating: FLACC (Rest) - Face 0   Pain Rating: FLACC (Rest) - Legs 0   Pain Rating: FLACC (Rest) - Activity 0   Pain Rating: FLACC (Rest) - Cry 0   Pain Rating: FLACC (Rest) - Consolability 0   Score: FLACC (Rest) 0   Pain Rating: FLACC (Activity) - Face 0   Pain Rating: FLACC (Activity) - Legs 0   Pain Rating: FLACC (Activity) - Activity 0   Pain Rating: FLACC (Activity) - Cry 0   Pain Rating: FLACC (Activity) - Consolability 0   Score: FLACC (Activity) 0   Restrictions/Precautions   Weight Bearing Precautions Per Order No   Other Precautions Cognitive; Chair Alarm;Multiple lines; Bed Alarm; Fall Risk   Lifestyle   Autonomy Per EMR, at baseline pt is I with ADLS and mobility  Reciprocal Relationships Sister   Service to Others Retired   Intrinsic Gratification Pt reports he likes to paint model cars   ADL   Where Assessed Chair   Grooming Assistance 4  Minimal Assistance   Grooming Deficit Setup;Steadying;Supervision/safety; Increased time to complete;Brushing hair   Bed Mobility   Supine to Sit Unable to assess   Sit to Supine Unable to assess   Additional Comments Upon arrival, pt found sitting upright in recliner; @ end of session, pt left sitting upright in recliner with all functional needs in reach w/ chair alarm activated   Transfers   Sit to Stand 3  Moderate assistance   Additional items Assist x 2; Increased time required;Verbal cues   Stand to Sit 3 Moderate assistance   Additional items Assist x 2; Increased time required;Verbal cues   Additional Comments Pt performed 2x STS w/ RW for safety/support in which initially pt required Max A x2 w/ rocking method with verbal cues for upright posture; in standing, pt w/ elevated HR between 130-140s in which pt returned to seated position in which HR returned to resting HR; 2nd initial stand performed w/ Mod A x2 w/ RW however pt w/ elevated HR in standing in which pt returned to seated position and further functional mobility deferred @ this time   Subjective   Subjective "Bye "   Cognition   Overall Cognitive Status Impaired   Arousal/Participation Arousable;Lethargic   Attention Attends with cues to redirect   Orientation Level Unable to assess   Memory Unable to assess   Following Commands Follows one step commands with increased time or repetition   Comments Pt presents w/ lethargy and flat affect in which pt minimally verbal t/o session; blank stares @ times; pt requiring increased time during all functional tasks w/ verbal cues for task initation/sequencing/completion   Activity Tolerance   Activity Tolerance Patient limited by fatigue;Treatment limited secondary to medical complications (Comment)  (cognition)   Medical Staff Made Aware Garfield Maradiaga DPT   Assessment   Assessment Pt is a 59 yo male who participated in skilled OT session on 11/17/2022  Pt seen for co-treat with PT to increase safety, decrease fall risk, and maximize functional/occupational performance 2* medical complexity which is a regression from pt's functional baseline  Treatment focused to improve functional transfers with fall prevention strategies, static/dynamic balance, postural/trunk control, proper body mechanics, functional use of b/l UE's, cognitive orientation, safety awareness, and overall increased activity tolerance in ADL/IADL/leisure tasks  Upon arrival, pt found sitting upright in recliner and was agreeable to OT session   Pt performed 2x STS w/ RW for safety/support in which initially pt required Max A x2 w/ rocking method with verbal cues for upright posture  In standing, pt w/ elevated HR between 130-140s in which pt returned to seated position in which HR returned to resting HR  In seated position, pt performing grooming tasks w/ Min A for task initiation/sequencintg/completion to promote functional use of b/l UE and AROM in which noted pt w/ LUE tremor  Pt performed 2nd STS with transitioned from Max A x2 to Mod A x2 w/ RW however pt w/ elevated HR in standing in which pt returned to seated position and further functional mobility deferred @ this time  At the end of the session, pt was left sitting upright in recliner with all functional needs in reach  Pt demonstrates gradual functional improvements towards OT goals however continues to be functioning below baseline and is still limited by the following limitations/impairments which were addressed through skilled instruction: cognition, generalized weakness, balance, endurance/activity tolerance, postural/trunk control, strength, pain, and safety awareness  At this time, recommend discharge to post-acute rehab when medically stable  The patient's raw score on the -PAC Daily Activity inpatient short form is 14, standardized score is 33 39, less than 39 4  Patients at this level are likely to benefit from discharge to post-acute rehabilitation services  Please refer to the recommendation of the Occupational Therapist for safe discharge planning  Established OT goals will be continued 2-3x/wk to address immediate acute care needs and underlying performance skills to maximize occupational performance and safety to return to OF  Plan   Treatment Interventions ADL retraining;Functional transfer training;UE strengthening/ROM; Endurance training;Cognitive reorientation;Patient/family training;Equipment evaluation/education; Compensatory technique education;Continued evaluation; Energy conservation; Activityengagement   Goal Expiration Date 11/21/22   OT Treatment Day 5   OT Frequency 2-3x/wk   Recommendation   OT Discharge Recommendation Post acute rehabilitation services   AM-PAC Daily Activity Inpatient   Lower Body Dressing 2   Bathing 2   Toileting 2   Upper Body Dressing 2   Grooming 3   Eating 3   Daily Activity Raw Score 14   Daily Activity Standardized Score (Calc for Raw Score >=11) 33 39   AM-PAC Applied Cognition Inpatient   Following a Speech/Presentation 1   Understanding Ordinary Conversation 2   Taking Medications 2   Remembering Where Things Are Placed or Put Away 2   Remembering List of 4-5 Errands 2   Taking Care of Complicated Tasks 1   Applied Cognition Raw Score 10   Applied Cognition Standardized Score 24 98     Irvin Jenkins MS, OTR/L

## 2022-11-17 NOTE — PLAN OF CARE
Problem: OCCUPATIONAL THERAPY ADULT  Goal: Performs self-care activities at highest level of function for planned discharge setting  See evaluation for individualized goals  Description: Treatment Interventions: ADL retraining, Functional transfer training, UE strengthening/ROM, Endurance training, Patient/family training, Cognitive reorientation, Equipment evaluation/education, Compensatory technique education, Continued evaluation, Energy conservation, Activityengagement          See flowsheet documentation for full assessment, interventions and recommendations  Outcome: Progressing  Note: Limitation: Decreased ADL status, Decreased endurance, Decreased cognition, Decreased self-care trans, Decreased high-level ADLs  Prognosis: Fair  Assessment: Pt is a 57 yo male who participated in skilled OT session on 11/17/2022  Pt seen for co-treat with PT to increase safety, decrease fall risk, and maximize functional/occupational performance 2* medical complexity which is a regression from pt's functional baseline  Treatment focused to improve functional transfers with fall prevention strategies, static/dynamic balance, postural/trunk control, proper body mechanics, functional use of b/l UE's, cognitive orientation, safety awareness, and overall increased activity tolerance in ADL/IADL/leisure tasks  Upon arrival, pt found sitting upright in recliner and was agreeable to OT session  Pt performed 2x STS w/ RW for safety/support in which initially pt required Max A x2 w/ rocking method with verbal cues for upright posture  In standing, pt w/ elevated HR between 130-140s in which pt returned to seated position in which HR returned to resting HR  In seated position, pt performing grooming tasks w/ Min A for task initiation/sequencintg/completion to promote functional use of b/l UE and AROM in which noted pt w/ LUE tremor   Pt performed 2nd STS with transitioned from Max A x2 to Mod A x2 w/ RW however pt w/ elevated HR in standing in which pt returned to seated position and further functional mobility deferred @ this time  At the end of the session, pt was left sitting upright in recliner with all functional needs in reach  Pt demonstrates gradual functional improvements towards OT goals however continues to be functioning below baseline and is still limited by the following limitations/impairments which were addressed through skilled instruction: cognition, generalized weakness, balance, endurance/activity tolerance, postural/trunk control, strength, pain, and safety awareness  At this time, recommend discharge to post-acute rehab when medically stable  The patient's raw score on the -PAC Daily Activity inpatient short form is 14, standardized score is 33 39, less than 39 4  Patients at this level are likely to benefit from discharge to post-acute rehabilitation services  Please refer to the recommendation of the Occupational Therapist for safe discharge planning  Established OT goals will be continued 2-3x/wk to address immediate acute care needs and underlying performance skills to maximize occupational performance and safety to return to OF       OT Discharge Recommendation: Post acute rehabilitation services

## 2022-11-17 NOTE — PROGRESS NOTES
[address] [facility]  Progress Note - Claudia Winters 1960, 58 y o  male MRN: [id number]  Unit/Bed#: [facility] 907-01 Encounter: [id number]  Primary Care Provider: Jason Henao MD   Date and time admitted to hospital: 11/3/2022  3:28 PM    Atrial fibrillation [facility])  Assessment & Plan  Will target INR between 2 5 in 3 5 for hypercoagulable state  Continue lovenox to warfarin bridge  Continue coreg 12 5 BID   Patient refused coreg last night and so is tachycardic this morning     Given AM dose today  Monitor response     Impaired decision making  Assessment & Plan  Per Neuropsych eval 11/14 patient does NOT have capacity to make medical decisions   Patient also has some level of cognitive impairment   B12 borderline low   · B12 IM shot x 1 dose   · Start b12 supplements    SHIMON (generalized anxiety disorder)  Assessment & Plan  · Previous admission from the end of September until the end of October at [facility] in which he was hospitalized for over a month for severe anxiety and inability to care for self  · Per sister reportedly was able to ambulate prior to the hospitalization but after the hospitalization patient needing assistance with ambulation at home  · During hospitalization there patient developed hyponatremia during his course of stay and Zoloft that had been started was stopped due to hyponatremia  · PRN lorazepam ordered   · Recently discharged from inpatient psychiatry with mirtazapine and aripiprazole, discussed resumption of thesewith the patient but he reports excess sedation with these  · D/w psychiatry earlier this week started buspar 5mg BID, and lexapro  · He does admit to depressive symptoms and anxiety, discussing certain topics with him result is worsening tremor  · Monitor response to antidepressants       Hyperthyroidism  Assessment & Plan  Per chart review patient has had low TSH and elevated free T4 in the past   Most recent labs during this admission with TSH 0 282 f t4 1 56  thyroid U/S during last admission at [facility] was unremarkable   Has seen Endo in the past during previous admission at [facility] (oct 22) and they recommend get repeat TFT, TSI and thyroid abs   · Endo consulted, appreciate reccs  · Repeat thyroid tests in 4-6 weeks     Severe protein-calorie malnutrition ([facility] )  Assessment & Plan  Malnutrition Findings:   Adult Malnutrition type: Chronic illness  Adult Degree of Malnutrition: Other severe protein calorie malnutrition  Malnutrition Characteristics: Inadequate energy, Weight loss  360 Statement: Severe Pro/cathryn malnutrition r/t inadequate intake/anxiety as evidenced by 28% unplanned weight loss since 5/3/22, consuming < 75% energy intake compared to estimated enrgy needs > 1 month  Treatment TBD s/p swallow eval    BMI Findings: Body mass index is 29 09 kg/m²  · Nutritional supplements    CVA (cerebral vascular accident) ([facility] )  Assessment & Plan  -Hx CVA x 3 in setting of antiphospholipid syndrome  -on PTA warfarin although noted supratherapeutic INR  -CT head in ER: No acute intracranial abnormality  Encephalomalacia involving the right occipital lobe and bilateral cerebelli  Grossly stable appearance of periventricular hypoattenuation compared to MRI brain 8/29/2022, likely representing chronic microvascular ischemic changes  PT/ ot consulted and recommending rehab    Tremor of right hand  Assessment & Plan  · No inpatient intervention as per Neurology  · Will follow-up with Dr Walt Pacheco as outpatient  Antiphospholipid antibody with hypercoagulable state ([place] [address] )  Assessment & Plan  -history of antiphospholipid antibody on PTA Coumadin  -had ischemic CVA despite being on Eliquis in the past  -restarting warfarin as noted above  -inr is subtherapeutic, will continue bridge with lovenox    VTE Pharmacologic Prophylaxis: VTE Score: 3 Moderate Risk (Score 3-4) - Pharmacological DVT Prophylaxis Ordered: enoxaparin (Lovenox)      Patient Centered Rounds: I performed bedside rounds with nursing staff today  Discussions with Specialists or Other Care Team Provider: CM    Education and Discussions with Family / Patient: Updated  (sister) via phone  Time Spent for Care: 20 minutes  More than 50% of total time spent on counseling and coordination of care as described above  Current Length of Stay: 14 day(s)  Current Patient Status: Inpatient   Certification Statement: The patient will continue to require additional inpatient hospital stay due to PENDING PLACEMENT TO REHAB  Discharge Plan: Anticipate discharge in 48-72 hrs to rehab facility  Code Status: Level 3 - DNAR and DNI    Subjective:   Overnight refusing meds  Refused warfarin, coreg, lovenox  Per RN, patient agitated over night and did not want meds and did not eat dinner  Not physically or verbally aggressive  Took meds this morning  Ate 100% of breakfast 75% of lunch    Objective:     Vitals:   Temp (24hrs), Av 7 °F (36 5 °C), Min:97 7 °F (36 5 °C), Max:97 7 °F (36 5 °C)    Temp:  [97 7 °F (36 5 °C)] 97 7 °F (36 5 °C)  HR:  [105-128] 128  BP: ()/(58-74) 92/58  SpO2:  [96 %] 96 %  Body mass index is 29 09 kg/m²  Input and Output Summary (last 24 hours): Intake/Output Summary (Last 24 hours) at 2022 1517  Last data filed at 2022 1100  Gross per 24 hour   Intake 240 ml   Output 150 ml   Net 90 ml       Physical Exam:   Physical Exam  Vitals and nursing note reviewed  Constitutional:       General: He is not in acute distress  Appearance: He is well-developed  HENT:      Head: Normocephalic and atraumatic  Eyes:      Conjunctiva/sclera: Conjunctivae normal    Cardiovascular:      Rate and Rhythm: Regular rhythm  Tachycardia present  Heart sounds: No murmur heard  Pulmonary:      Effort: Pulmonary effort is normal  No respiratory distress  Breath sounds: Normal breath sounds     Abdominal:      General: Bowel sounds are normal  Palpations: Abdomen is soft  Tenderness: There is no abdominal tenderness  Musculoskeletal:         General: No swelling  Cervical back: Neck supple  Right lower leg: No edema  Left lower leg: No edema  Skin:     General: Skin is warm and dry  Capillary Refill: Capillary refill takes less than 2 seconds  Neurological:      Mental Status: He is alert  Mental status is at baseline  Psychiatric:         Mood and Affect: Mood normal  Affect is flat  Cognition and Memory: Cognition is impaired  Additional Data:     Labs:  Results from last 7 days   Lab Units 11/14/22  0518   WBC Thousand/uL 6 64   HEMOGLOBIN g/dL 11 7*   HEMATOCRIT % 37 0   PLATELETS Thousands/uL 154     Results from last 7 days   Lab Units 11/16/22  0514 11/12/22  0439 11/11/22  0527   SODIUM mmol/L 147   < > 149*   POTASSIUM mmol/L 3 6   < > 3 4*   CHLORIDE mmol/L 117*   < > 119*   CO2 mmol/L 23   < > 27   BUN mg/dL 14   < > 24   CREATININE mg/dL 0 74   < > 1 10   ANION GAP mmol/L 7   < > 3*   CALCIUM mg/dL 8 4   < > 8 5   ALBUMIN g/dL  --   --  2 2*   TOTAL BILIRUBIN mg/dL  --   --  1 03*   ALK PHOS U/L  --   --  71   ALT U/L  --   --  52   AST U/L  --   --  37   GLUCOSE RANDOM mg/dL 99   < > 126    < > = values in this interval not displayed  Results from last 7 days   Lab Units 11/17/22  0515   INR  1 18     Results from last 7 days   Lab Units 11/15/22  0801   POC GLUCOSE mg/dl 98             Lines/Drains:  Invasive Devices     Peripheral Intravenous Line  Duration           Peripheral IV 11/13/22 Dorsal (posterior); Left Forearm 4 days          Drain  Duration           External Urinary Catheter 1 day              Imaging: Reviewed radiology reports from this admission including: chest xray and xray(s)    Recent Cultures (last 7 days):       Last 24 Hours Medication List:   Current Facility-Administered Medications   Medication Dose Route Frequency Provider Last Rate   • busPIRone  5 mg Oral BID Julio Morocho MD     • carvedilol  12 5 mg Oral BID With Meals Tana Lott, DO     • vitamin B-12  500 mcg Oral Daily Jasmraleigh Lott, DO     • cyanocobalamin  1,000 mcg Intramuscular Q30 Days Jasmraleigh Lott, DO     • enoxaparin  1 mg/kg Subcutaneous Q12H [address] Julio Morocho MD     • escitalopram  5 mg Oral Daily Jasmraleigh Kaylie, DO     • fluticasone  1 spray Each Nare Daily Tana Lott, DO     • folic acid  1 mg Oral Daily Julio Morocho MD     • LORazepam  0 5 mg Oral Q8H PRN Julio Morocho MD     • multivitamin stress formula  1 tablet Oral Daily Julio Morocho MD     • pantoprazole  40 mg Oral Early Morning [facility]     • potassium chloride  10 mEq Oral TID With Meals Julio Morocho MD     • senna  1 tablet Oral HS [facility], [facility]     • sodium chloride  1 spray Each Nare Q6H [facility], DO     • thiamine  100 mg Oral Daily Julio Morocho MD     • warfarin  5 mg Oral Daily (warfarin) Julio Morocho MD          Today, Patient Was Seen By: Daron Wahl DO    **Please Note: This note may have been constructed using a voice recognition system  **

## 2022-11-17 NOTE — ASSESSMENT & PLAN NOTE
Per chart review patient has had low TSH and elevated free T4 in the past   Most recent labs during this admission with TSH 0 282 f t4 1 56  thyroid U/S during last admission at [facility] was unremarkable   Has seen Endo in the past during previous admission at [facility] (oct 22) and they recommend get repeat TFT, TSI and thyroid abs   · Endo consulted, appreciate reccs  · Repeat thyroid tests in 4-6 weeks

## 2022-11-17 NOTE — CASE MANAGEMENT
Case Management Discharge Planning Note    Patient name [facility]  Location [address] [place] [address] [facility] [id number]  : 1960 Date 2022       Current Admission Date: 11/3/2022  Current Admission Diagnosis:Atrial fibrillation [facility])   Patient Active Problem List    Diagnosis Date Noted   • Impaired decision making 2022   • Hyperthyroidism 11/10/2022   • Severe protein-calorie malnutrition ([facility] ) 2022   • Hyponatremia 10/14/2022   • SIADH (syndrome of inappropriate ADH production) [facility] 10/14/2022   • Subclinical hyperthyroidism 10/14/2022   • Sinus arrhythmia 10/10/2022   • Mild protein-calorie malnutrition ([facility] ) 10/07/2022   • Medical clearance for incarceration 2022   • 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 attack 2022   • Atypical chest pain 2022   • Hypokalemia 2022   • Dizziness 2022   • Renal cyst 2022   • PVD (peripheral vascular disease) ([facility] ) 2022   • Ataxia 2022   • CVA (cerebral vascular accident) ([facility] ) 2022   • Right inguinal pain 2022   • Alkaline phosphatase elevation 2022   • Low back pain 2022   • S/P laparoscopic hernia repair 2021   • Non-recurrent bilateral inguinal hernia without obstruction or gangrene 2021   • Gastric polyps 2021   • Gastric AVM 2021   • Edema of both lower legs 2021   • Tremor of right hand 2021   • Weakness of both lower extremities 2021   • Anticoagulated on Coumadin 2021   • CORIE (obstructive sleep apnea)    • Hyperbilirubinemia 08/10/2020   • Antiphospholipid antibody with hypercoagulable state ([facility] ) 2020   • History of stroke 2020   • Other viral warts 2019   • Physical deconditioning 2019   • Class 2 obesity in adult 2018   • Bright red blood per rectum 11/10/2017   • Numbness 2017   • H/O aortic aneurysm repair 2017   • Hypertension 2017   • GERD (gastroesophageal reflux disease) 08/26/2017   • Hyperlipidemia 03/17/2017   • Peyronie disease 12/09/2016   • Vitamin D deficiency 06/15/2016   • Atrial fibrillation ([address] ) 11/17/2014   • SHIMON (generalized anxiety disorder) 11/11/2014   • Constipation 09/19/2014   • Irritable bowel syndrome 04/24/2014   • Kidney stones 04/24/2014      LOS (days): 14  Geometric Mean LOS (GMLOS) (days): 5 00  Days to GMLOS:-8 7     OBJECTIVE:  Risk of Unplanned Readmission Score: 28 39         Current admission status: Inpatient   Preferred Pharmacy:   [facility] [address] [facility], [address]  Phone: [phone] Fax: [phone]    Primary Care Provider: Ballard Bence, MD    Primary Insurance: [address]  Secondary Insurance:     DISCHARGE DETAILS:           Other Referral/Resources/Interventions Provided:  Referral Comments:  continues to maintain contact with Aritseo Blackmon  They aware aware that optioning determination has not been received yet  Per the county, the patient is a level 2 and it may take a little bit longer  Continue to await determination at this time

## 2022-11-18 ENCOUNTER — TELEPHONE (OUTPATIENT)
Dept: PSYCHIATRY | Facility: CLINIC | Age: 62
End: 2022-11-18

## 2022-11-18 LAB
INR PPP: 1.1 (ref 0.84–1.19)
PROTHROMBIN TIME: 14.5 SECONDS (ref 11.6–14.5)

## 2022-11-18 RX ADMIN — METOPROLOL SUCCINATE 50 MG: 50 TABLET, EXTENDED RELEASE ORAL at 10:45

## 2022-11-18 RX ADMIN — CYANOCOBALAMIN TAB 500 MCG 500 MCG: 500 TAB at 10:45

## 2022-11-18 RX ADMIN — BUSPIRONE HYDROCHLORIDE 5 MG: 5 TABLET ORAL at 17:17

## 2022-11-18 RX ADMIN — ENOXAPARIN SODIUM 90 MG: 100 INJECTION SUBCUTANEOUS at 20:46

## 2022-11-18 RX ADMIN — FOLIC ACID 1 MG: 1 TABLET ORAL at 10:45

## 2022-11-18 RX ADMIN — ENOXAPARIN SODIUM 90 MG: 100 INJECTION SUBCUTANEOUS at 10:46

## 2022-11-18 RX ADMIN — BUSPIRONE HYDROCHLORIDE 5 MG: 5 TABLET ORAL at 10:45

## 2022-11-18 RX ADMIN — ESCITALOPRAM OXALATE 5 MG: 5 TABLET, FILM COATED ORAL at 10:45

## 2022-11-18 RX ADMIN — B-COMPLEX W/ C & FOLIC ACID TAB 1 TABLET: TAB at 10:45

## 2022-11-18 RX ADMIN — THIAMINE HCL TAB 100 MG 100 MG: 100 TAB at 10:49

## 2022-11-18 RX ADMIN — FLUTICASONE PROPIONATE 1 SPRAY: 50 SPRAY, METERED NASAL at 10:50

## 2022-11-18 RX ADMIN — WARFARIN SODIUM 5 MG: 5 TABLET ORAL at 10:49

## 2022-11-18 RX ADMIN — POTASSIUM CHLORIDE 30 MEQ: 1500 TABLET, EXTENDED RELEASE ORAL at 10:44

## 2022-11-18 NOTE — SPEECH THERAPY NOTE
Speech Language/Pathology    Speech/Language Pathology Progress Note    Patient Name: Riassa Lucero  [id number] Date: 11/18/2022     Problem List  Active Problems:    SHIMON (generalized anxiety disorder)    Atrial fibrillation (HCC)    Antiphospholipid antibody with hypercoagulable state ([facility] )    Tremor of right hand    CVA (cerebral vascular accident) ([facility] )    Severe protein-calorie malnutrition ([facility] )    Hyperthyroidism    Impaired decision making       Past Medical History  Past Medical History:   Diagnosis Date   • Aneurysm of thoracic aorta     last assessed 11/20/17   • Anxiety     currently on no meds   • Arthritis    • Bilateral inguinal hernia    • Cardiac disease    • Cognitive impairment    • CVA (cerebral vascular accident) ([facility] )    • Depression    • GERD (gastroesophageal reflux disease)    • Hearing loss of aging    • Heart disease    • Hyperlipidemia    • Hypertension    • Irritable bowel syndrome    • Kidney stone    • Obesity    • Occasional tremors     left arm since stroke   • Palpitations    • Panic attack    • PONV (postoperative nausea and vomiting)     and headache x 3 days   • Psychiatric illness    • Sciatica     right and left  side   • Shortness of breath     on exertion   • Sleep apnea     is not using a CPAP   • Sleep difficulties    • Spitting up blood 05/21/2021    Resolved   • Status post placement of implantable loop recorder    • Stroke ([facility] ) 11/2014   • Stroke ([facility] ) 03/2021   • TIA (transient ischemic attack)         Past Surgical History  Past Surgical History:   Procedure Laterality Date   • AORTA SURGERY      thoracic - aneurysmorrhaphy   • APPENDECTOMY     • ASCENDING AORTIC ANEURYSM REPAIR      resolved 8/19/15   • CARDIAC LOOP RECORDER     • CHOLECYSTECTOMY      laparoscopic   • COLONOSCOPY     • CYSTOSCOPY      with removal of object- stent removal   • KNEE ARTHROSCOPY Right 1996    with medial meniscus repair   • LITHOTRIPSY      renal   • ORTHOPEDIC SURGERY     • HI FRAGMENT KIDNEY STONE/ ESWL Left 5/17/2018    Procedure: LITHROTRIPSY EXTRACORPORAL SHOCKWAVE (ESWL); Surgeon: Foster Miranda MD;  [facility]: [facility];  Service: Urology   • OK Ameena Raymundo 19 Bilateral 12/9/2021    Procedure: ROBOTIC REPAIR HERNIA INGUINAL;  Surgeon: Pedro Sanchez MD;  Location: [facility];  Service: General   • THUMB ARTHROSCOPY Right 1976    ligament repair   • UPPER GASTROINTESTINAL ENDOSCOPY           Subjective:  Pt awake and alert, breakfast tray at bedside  Current Diet:  Regular w/ thin     Objective:  Pt seen for dx dysphagia tx f/u  Pt seen w/ breakfast tray regular textures: grilled muffin, scrambled eggs, dry cereal, and thin liquids  Pt needs full assistance w/ feeding  Mastication of all material min prolonged, ultimately functional for effective break down and transfer  Pt able to transfer all material without liquid wash today  Swallows suspected timely  No overt s/s aspiration across trials today  Education reviewed on strategies to optimize swallow safety and s/s aspiration to notify medical team of should they arise  Assessment:  Min oropharyngeal dysphagia, functional to continue regular diet/thin liquids       Plan/Recommendations:  Regular w/ thin  Full supervision  Frequent/thorough oral care  No IP ST needs, please re-consult if medically necessary

## 2022-11-18 NOTE — QUICK NOTE
Pt HR sustaining high 110-120s  Refused coreg dose this AM and then Coreg was switched to Toprol XL this afternoon which he again refused   /78, will give 1x dose of IV Lopressor 2 5mg

## 2022-11-18 NOTE — ASSESSMENT & PLAN NOTE
Malnutrition Findings:   Adult Malnutrition type: Chronic illness  Adult Degree of Malnutrition: Other severe protein calorie malnutrition  Malnutrition Characteristics: Inadequate energy, Weight loss                  360 Statement: Severe Pro/cathryn malnutrition r/t inadequate intake/anxiety as evidenced by 28% unplanned weight loss since 5/3/22, consuming < 75% energy intake compared to estimated enrgy needs > 1 month  Treatment TBD s/p swallow eval    BMI Findings: Body mass index is 29 1 kg/m²       Po intake improving

## 2022-11-18 NOTE — ASSESSMENT & PLAN NOTE
· Previous admission from the end of September until the end of October at [facility] in which he was hospitalized for over a month for severe anxiety and inability to care for self  · Per sister reportedly was able to ambulate prior to the hospitalization but after the hospitalization patient needing assistance with ambulation at home  · During hospitalization there patient developed hyponatremia during his course of stay and Zoloft that had been started was stopped due to hyponatremia  · PRN lorazepam ordered   · Recently discharged from inpatient psychiatry with mirtazapine and aripiprazole, discussed resumption of thesewith the patient but he reports excess sedation with these  · D/w psychiatry earlier this week started buspar 5mg BID, and lexapro  · He does admit to depressive symptoms and anxiety, discussing certain topics with him result is worsening tremor  · Monitor response to antidepressants  · Re consult psych for re-evaluation / possible IP psych admission for [facility]

## 2022-11-18 NOTE — CASE MANAGEMENT
Case Management Discharge Planning Note    Patient name Kelsey Carver  Location [address] [place] [address] 907/[facility] MRN [id number]  : 1960 Date 2022       Current Admission Date: 11/3/2022  Current Admission Diagnosis:Atrial fibrillation [facility])   Patient Active Problem List    Diagnosis Date Noted   • Impaired decision making 2022   • Hyperthyroidism 11/10/2022   • Severe protein-calorie malnutrition ([facility] ) 2022   • Hyponatremia 10/14/2022   • SIADH (syndrome of inappropriate ADH production) ([facility] ) 10/14/2022   • Subclinical hyperthyroidism 10/14/2022   • Sinus arrhythmia 10/10/2022   • Mild protein-calorie malnutrition ([facility] ) 10/07/2022   • Medical clearance for incarceration 2022   • 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 attack 2022   • Atypical chest pain 2022   • Hypokalemia 2022   • Dizziness 2022   • Renal cyst 2022   • PVD (peripheral vascular disease) ([facility] ) 2022   • Ataxia 2022   • CVA (cerebral vascular accident) ([facility] ) 2022   • Right inguinal pain 2022   • Alkaline phosphatase elevation 2022   • Low back pain 2022   • S/P laparoscopic hernia repair 2021   • Non-recurrent bilateral inguinal hernia without obstruction or gangrene 2021   • Gastric polyps 2021   • Gastric AVM 2021   • Edema of both lower legs 2021   • Tremor of right hand 2021   • Weakness of both lower extremities 2021   • Anticoagulated on Coumadin 2021   • CORIE (obstructive sleep apnea)    • Hyperbilirubinemia 08/10/2020   • Antiphospholipid antibody with hypercoagulable state ([facility] ) 2020   • History of stroke 2020   • Other viral warts 2019   • Physical deconditioning 2019   • Class 2 obesity in adult 2018   • Bright red blood per rectum 11/10/2017   • Numbness 2017   • H/O aortic aneurysm repair 2017   • Hypertension 2017   • GERD (gastroesophageal reflux disease) 08/26/2017   • Hyperlipidemia 03/17/2017   • Peyronie disease 12/09/2016   • Vitamin D deficiency 06/15/2016   • Atrial fibrillation ([address] ) 11/17/2014   • SHIMON (generalized anxiety disorder) 11/11/2014   • Constipation 09/19/2014   • Irritable bowel syndrome 04/24/2014   • Kidney stones 04/24/2014      LOS (days): 15  Geometric Mean LOS (GMLOS) (days): 5 00  Days to GMLOS:-9 6     OBJECTIVE:  Risk of Unplanned Readmission Score: 28 69         Current admission status: Inpatient   Preferred Pharmacy:   [facility]/pharmacy [address] [facility], [address]  Phone: [phone] Fax: [phone]    Primary Care Provider: Jackelin Baltazar MD    Primary Insurance: Blayne Grant [address]:     DISCHARGE DETAILS:            Other Referral/Resources/Interventions Provided:  Referral Comments: CM called and spoke with [facility] ([phone])  They confirmed that they have received all of the necessary information that is needed for them to make their determination  They however confirmed that it is not yet complete at this time  [facility] continues to need the determination letter back before they are able to accept the patient           Treatment Team Recommendation: Short Term Rehab  Discharge Destination Plan[de-identified] Short Term Rehab

## 2022-11-18 NOTE — ASSESSMENT & PLAN NOTE
Per chart review patient has had low TSH and elevated free T4 in the past   Most recent labs during this admission with TSH 0 282 f t4 1 56  thyroid U/S during last admission at [address] [place] [address] was unremarkable   Has seen Endo in the past during previous admission at [address] [place] [address] (oct 22) and they recommend get repeat TFT, TSI and thyroid abs   · Endo consulted, appreciate reccs  · Repeat thyroid tests in 4-6 weeks

## 2022-11-18 NOTE — PROGRESS NOTES
[facility]  Progress Note - Venice Easley 1960, 58 y o  male MRN: [id number]  Unit/Bed#: [facility] 907-01 Encounter: [id number]  Primary Care Provider: Suman Leong MD   Date and time admitted to hospital: 11/3/2022  3:28 PM    Atrial fibrillation [facility])  Assessment & Plan  Will target INR between 2 5 in 3 5 for hypercoagulable state  · Continue lovenox to warfarin bridge  · Switched to once daily metroplol XL 50 qd to ease pill burdern    Impaired decision making  Assessment & Plan  Per Neuropsych eval 11/14 patient does NOT have capacity to make medical decisions   Patient also has some level of cognitive impairment   B12 borderline low   · B12 IM shot x 1 dose   · Start b12 supplements    SHIMON (generalized anxiety disorder)  Assessment & Plan  · Previous admission from the end of September until the end of October at [facility] in which he was hospitalized for over a month for severe anxiety and inability to care for self  · Per sister reportedly was able to ambulate prior to the hospitalization but after the hospitalization patient needing assistance with ambulation at home  · During hospitalization there patient developed hyponatremia during his course of stay and Zoloft that had been started was stopped due to hyponatremia  · PRN lorazepam ordered   · Recently discharged from inpatient psychiatry with mirtazapine and aripiprazole, discussed resumption of thesewith the patient but he reports excess sedation with these  · D/w psychiatry earlier this week started buspar 5mg BID, and lexapro  · He does admit to depressive symptoms and anxiety, discussing certain topics with him result is worsening tremor  · Monitor response to antidepressants  · Re consult psych for re-evaluation / possible IP psych admission for [facility]       Hyperthyroidism  Assessment & Plan  Per chart review patient has had low TSH and elevated free T4 in the past   Most recent labs during this admission with TSH 0 282 f t4 1 56  thyroid U/S during last admission at [address] [place] [address] was unremarkable   Has seen Endo in the past during previous admission at [address] [place] [address] (oct 22) and they recommend get repeat TFT, TSI and thyroid abs   · Endo consulted, appreciate reccs  · Repeat thyroid tests in 4-6 weeks     Severe protein-calorie malnutrition ([facility] )  Assessment & Plan  Malnutrition Findings:   Adult Malnutrition type: Chronic illness  Adult Degree of Malnutrition: Other severe protein calorie malnutrition  Malnutrition Characteristics: Inadequate energy, Weight loss  360 Statement: Severe Pro/cathryn malnutrition r/t inadequate intake/anxiety as evidenced by 28% unplanned weight loss since 5/3/22, consuming < 75% energy intake compared to estimated enrgy needs > 1 month  Treatment TBD s/p swallow eval  BMI Findings: Body mass index is 29 1 kg/m²  · Nutritional supplements    CVA (cerebral vascular accident) ([place] [address] )  Assessment & Plan  -Hx CVA x 3 in setting of antiphospholipid syndrome  -on PTA warfarin although noted supratherapeutic INR  -CT head in ER: No acute intracranial abnormality  Encephalomalacia involving the right occipital lobe and bilateral cerebelli  Grossly stable appearance of periventricular hypoattenuation compared to MRI brain 8/29/2022, likely representing chronic microvascular ischemic changes  · PT/ ot consulted and recommending rehab    Tremor of right hand  Assessment & Plan  · Outpatient follow-up with Neurology  No inpatient intervention as per Neurology  Will follow-up with Dr Walt Pacheco as outpatient  Patient known to him for prior stroke      Antiphospholipid antibody with hypercoagulable state ([place] [address] )  Assessment & Plan  -history of antiphospholipid antibody on PTA Coumadin  -had ischemic CVA despite being on Eliquis in the past  -restarting warfarin as noted above  -inr is subtherapeutic, will continue bridge with lovenox    VTE Pharmacologic Prophylaxis: VTE Score: 3 Moderate Risk (Score 3-4) - Pharmacological DVT Prophylaxis Ordered: enoxaparin (Lovenox)  Patient Centered Rounds: I performed bedside rounds with nursing staff today  Discussions with Specialists or Other Care Team Provider: [facility]    Education and Discussions with Family / Patient: Dr Melody Xiao will call patient's family today to talk about Cesar Diego    Time Spent for Care: 20 minutes  More than 50% of total time spent on counseling and coordination of care as described above  Current Length of Stay: 15 day(s)  Current Patient Status: Inpatient   Certification Statement: The patient will continue to require additional inpatient hospital stay due to pending re-eval by psych / placement  Discharge Plan: Anticipate discharge in 48-72 hrs to rehab facility  Code Status: Level 3 - DNAR and DNI    Subjective:   Yesterday patient was tachy throughout the day due to refusal of Coreg  Coreg was switched to once a day metoprolol to decrease pill burden  However patient refused metoprolol evening dose  Overnight was given 2 5 mg IV Lopressor without much change in heart rate  Remains tachycardic in the 110s    This morning patient agreed to take all of his meds around 10:45 a m     This includes warfarin which was changed to morning scheduled administration as patient appears to be more amenable to taking meds during the day verses in the evening  Objective:     Vitals:   Temp (24hrs), Av 5 °F (36 9 °C), Min:97 9 °F (36 6 °C), Max:99 5 °F (37 5 °C)    Temp:  [97 9 °F (36 6 °C)-99 5 °F (37 5 °C)] 98 6 °F (37 °C)  HR:  [109-121] 109  Resp:  [16-18] 16  BP: (113-135)/(78-90) 125/84  SpO2:  [96 %] 96 %  Body mass index is 29 1 kg/m²  Input and Output Summary (last 24 hours): Intake/Output Summary (Last 24 hours) at 2022 1142  Last data filed at 2022 0700  Gross per 24 hour   Intake 480 ml   Output 550 ml   Net -70 ml       Physical Exam:   Physical Exam  Vitals and nursing note reviewed     Constitutional:       General: He is not in acute distress  Appearance: He is well-developed  HENT:      Head: Normocephalic and atraumatic  Eyes:      Conjunctiva/sclera: Conjunctivae normal    Cardiovascular:      Rate and Rhythm: Normal rate and regular rhythm  Heart sounds: No murmur heard  Pulmonary:      Effort: Pulmonary effort is normal  No respiratory distress  Breath sounds: Normal breath sounds  Abdominal:      General: Bowel sounds are normal       Palpations: Abdomen is soft  Tenderness: There is no abdominal tenderness  There is no guarding  Musculoskeletal:         General: No swelling  Cervical back: Neck supple  Right lower leg: No edema  Left lower leg: No edema  Skin:     General: Skin is warm and dry  Capillary Refill: Capillary refill takes less than 2 seconds  Neurological:      Mental Status: He is alert  Psychiatric:         Mood and Affect: Affect is flat          Additional Data:     Labs:  Results from last 7 days   Lab Units 11/14/22  0518   WBC Thousand/uL 6 64   HEMOGLOBIN g/dL 11 7*   HEMATOCRIT % 37 0   PLATELETS Thousands/uL 154     Results from last 7 days   Lab Units 11/16/22  0514   SODIUM mmol/L 147   POTASSIUM mmol/L 3 6   CHLORIDE mmol/L 117*   CO2 mmol/L 23   BUN mg/dL 14   CREATININE mg/dL 0 74   ANION GAP mmol/L 7   CALCIUM mg/dL 8 4   GLUCOSE RANDOM mg/dL 99     Results from last 7 days   Lab Units 11/18/22  0514   INR  1 10     Results from last 7 days   Lab Units 11/15/22  0801   POC GLUCOSE mg/dl 98               Lines/Drains:  Invasive Devices     Drain  Duration           External Urinary Catheter 2 days                      Imaging: Reviewed radiology reports from this admission including: xray(s)    Recent Cultures (last 7 days):         Last 24 Hours Medication List:   Current Facility-Administered Medications   Medication Dose Route Frequency Provider Last Rate   • busPIRone  5 mg Oral BID Renu Hopper MD     • vitamin B-12  500 mcg Oral Daily Tana Yudi Dennis DO     • cyanocobalamin  1,000 mcg Intramuscular Q30 Days Jasmit Kaylie, DO     • enoxaparin  1 mg/kg Subcutaneous Q12H Blair Ching MD     • escitalopram  5 mg Oral Daily Jasmit Kaylie, DO     • fluticasone  1 spray Each Nare Daily Jasmit Kaylie, DO     • folic acid  1 mg Oral Daily Tessie Blair MD     • LORazepam  0 5 mg Oral Q8H PRN Tessie Blair MD     • metoprolol succinate  50 mg Oral Daily Theodore Naylor MD     • multivitamin stress formula  1 tablet Oral Daily Tessie Blair MD     • pantoprazole  40 mg Oral Early Morning [facility] Kay[facility], DO     • potassium chloride  30 mEq Oral Daily Theodore Naylor MD     • senna  1 tablet Oral HS PRN Theodore Naylor MD     • sodium chloride  1 spray Each Nare Q1H PRN Theodore Naylor MD     • thiamine  100 mg Oral Daily Tessie Blair MD     • warfarin  5 mg Oral QAM Theodore Naylor MD          Today, Patient Was Seen By: Ted Paredes DO    **Please Note: This note may have been constructed using a voice recognition system  **

## 2022-11-18 NOTE — TELEPHONE ENCOUNTER
Pt sister left a voice message to cancel pt appointment on Monday 11/21 at 2 pm due  to pt currently being hospitalized  Patient is calling regarding cancelling an appointment      Date/Time: 11/21 at 2 pm    Reason: hospital     Patient was rescheduled: YES [] NO [x]  If yes, when was Patient reschedule for:     Patient requesting call back to reschedule: YES [] NO []

## 2022-11-18 NOTE — ASSESSMENT & PLAN NOTE
Will target INR between 2 5 in 3 5 for hypercoagulable state  · Continue lovenox to warfarin bridge  · Switched to once daily metroplol XL 50 qd to ease pill burdern

## 2022-11-18 NOTE — RESTORATIVE TECHNICIAN NOTE
Restorative Technician Note      Patient Name: Raissa Lucero     Restorative Tech Visit Date: 11/18/22  Note Type: Mobility  Patient Position Upon Consult: Supine  Activity Performed: Transferred  Assistive Device: Other (Comment) (BHHAx2 stand-pivot transfer)  Patient Position at End of Consult: Bedside chair; All needs within reach; Bed/Chair alarm activated;  Other (comment) (left in the care of PCA)      Syed Thomas

## 2022-11-18 NOTE — WOUND OSTOMY CARE
Consult Note - Wound   Molina Clamp Corky 58 y o  male MRN: [id number]  Unit/Bed#: [facility] 907-01 Encounter: [id number]        History and Present Illness:  Wound care following patient for left buttocks stage 2 pressure injury  57 yo male admitted to hospital for treatment for Rhamdomyolosis  PMH: CVA x3, antiphospholipid syndrome on PTA warfarin, thoracic aortic aneurysm, cognitive impairment, severe anxiety  Max assist for turning and repositioning and dependent for all care needs  Incontinent of bowel as noted on exam and internal urinary catheter managing urine       Wound Care Team was re-consulted for right elbow skin tear  Assessment Findings:  B/L heels are dry, intact, and ashley with no skin loss or wounds present  1  HA Left Buttocks Evolving DTPI: Of note, this is site of previous HA stage 2 Pressure Injury  Area is irregular in shape with scattered areas of full thickness skin loss  Wound bed is 60% light purple non-blanchable erythema and 40% beefy red bleeding granulation tissue  Reena-wound is dry and scaly  All other aspects of sacro-buttocks are dry, intact, and ashley  Scant sanguineous and serosanguineous drainage  Recommend switching to TRIAD Paste  Specialty mattress ordered for patient  2  Right Elbow Skin Tear: area of partial thickness skin loss with a pink and yellow, blanchable wound bed  Reena-wound is pink in color and blanches  Scant serosanguineous drainage noted  Recommend covering with allevyn foam dressing  No induration, fluctuance, odor, warmth/temperature differences, redness, or purulence noted to the above noted wounds and skin areas assessed  New dressings applied per orders listed below  Patient tolerated well- no s/s of non-verbal pain or discomfort observed during the encounter  Bedside nurse aware of plan of care  See flow sheets for more detailed assessment findings        Orders listed below and wound care will continue to follow, call or tiger text with questions  Skin Care Plan:  1-Apply TRIAD Paste to Sacro-Buttocks Wound TID and PRN episodes of incontinence  2-Turn/reposition q2h or when medically stable for pressure re-distribution on skin   3-Elevate heels to offload pressure  4-Moisturize skin daily with skin nourishing cream  5-Ehob cushion in chair when out of bed  6-Preventative hydraguard to bilateral heels BID and PRN  7-Right Elbow: Cleanse area and pat dry  Cover with small clover allevyn foam dressing  Jose M with T for Treatment  Change every other day or PRN    8-P-500 Specialty mattress ordered for patient     Wounds:  Wound 11/06/22  Buttocks Left (Active)   Wound Image   11/18/22 0846   Wound Description Beefy red;Granulation tissue; Non-blanchable erythema;Light purple 11/18/22 0846   Pressure Injury Stage DTPI 11/18/22 0846   Reena-wound Assessment Dry;Scaly 11/18/22 0846   Wound Length (cm) 7 5 cm 11/18/22 0846   Wound Width (cm) 6 cm 11/18/22 0846   Wound Depth (cm) 0 2 cm 11/18/22 0846   Wound Surface Area (cm^2) 45 cm^2 11/18/22 0846   Wound Volume (cm^3) 9 cm^3 11/18/22 0846   Calculated Wound Volume (cm^3) 9 cm^3 11/18/22 0846   Change in Wound Size % -8900 11/18/22 0846   Wound Site Closure REBECCA 11/18/22 0846   Drainage Amount Scant 11/18/22 0846   Drainage Description Sanguineous; Serosanguineous 11/18/22 0846   Non-staged Wound Description Full thickness 11/18/22 0846   Treatments Cleansed;Irrigation with NSS;Site care 11/18/22 0846   Dressing Other (Comment) 11/18/22 0846   Wound packed? No 11/18/22 0846   Dressing Changed New 11/18/22 0846   Patient Tolerance Tolerated well 11/18/22 0846   Dressing Status Clean;Dry; Intact 11/18/22 0846       Wound 11/18/22 Skin tear Arm Posterior;Proximal;Right; Inferior (Active)   Wound Image   11/18/22 0844   Wound Description Yellow;Pink 11/18/22 0844   Reena-wound Assessment Pink 11/18/22 0844   Wound Length (cm) 1 5 cm 11/18/22 0844   Wound Width (cm) 3 cm 11/18/22 0844   Wound Depth (cm) 0 1 cm 11/18/22 0844   Wound Surface Area (cm^2) 4 5 cm^2 11/18/22 0844   Wound Volume (cm^3) 0 45 cm^3 11/18/22 0844   Calculated Wound Volume (cm^3) 0 45 cm^3 11/18/22 0844   Drainage Amount Scant 11/18/22 0844   Drainage Description Serosanguineous 11/18/22 0844   Non-staged Wound Description Partial thickness 11/18/22 0844   Treatments Cleansed;Site care 11/18/22 0844   Dressing Foam 11/18/22 0844   Wound packed? No 11/18/22 0844   Dressing Changed New 11/18/22 0844   Patient Tolerance Tolerated well 11/18/22 0844   Dressing Status Clean;Dry; Intact 11/18/22 0844               Jacqueline Vazquez RN, BSN

## 2022-11-19 LAB
ANION GAP SERPL CALCULATED.3IONS-SCNC: 7 MMOL/L (ref 4–13)
BASOPHILS # BLD AUTO: 0.01 THOUSANDS/ÂΜL (ref 0–0.1)
BASOPHILS NFR BLD AUTO: 0 % (ref 0–1)
BUN SERPL-MCNC: 19 MG/DL (ref 5–25)
CALCIUM SERPL-MCNC: 8.5 MG/DL (ref 8.3–10.1)
CHLORIDE SERPL-SCNC: 119 MMOL/L (ref 96–108)
CO2 SERPL-SCNC: 23 MMOL/L (ref 21–32)
CREAT SERPL-MCNC: 0.7 MG/DL (ref 0.6–1.3)
EOSINOPHIL # BLD AUTO: 0.04 THOUSAND/ÂΜL (ref 0–0.61)
EOSINOPHIL NFR BLD AUTO: 1 % (ref 0–6)
ERYTHROCYTE [DISTWIDTH] IN BLOOD BY AUTOMATED COUNT: 16.8 % (ref 11.6–15.1)
GFR SERPL CREATININE-BSD FRML MDRD: 101 ML/MIN/1.73SQ M
GLUCOSE SERPL-MCNC: 96 MG/DL (ref 65–140)
HCT VFR BLD AUTO: 34.1 % (ref 36.5–49.3)
HGB BLD-MCNC: 10.7 G/DL (ref 12–17)
IMM GRANULOCYTES # BLD AUTO: 0.01 THOUSAND/UL (ref 0–0.2)
IMM GRANULOCYTES NFR BLD AUTO: 0 % (ref 0–2)
LYMPHOCYTES # BLD AUTO: 0.7 THOUSANDS/ÂΜL (ref 0.6–4.47)
LYMPHOCYTES NFR BLD AUTO: 13 % (ref 14–44)
MCH RBC QN AUTO: 31.5 PG (ref 26.8–34.3)
MCHC RBC AUTO-ENTMCNC: 31.4 G/DL (ref 31.4–37.4)
MCV RBC AUTO: 100 FL (ref 82–98)
MONOCYTES # BLD AUTO: 0.34 THOUSAND/ÂΜL (ref 0.17–1.22)
MONOCYTES NFR BLD AUTO: 6 % (ref 4–12)
NEUTROPHILS # BLD AUTO: 4.18 THOUSANDS/ÂΜL (ref 1.85–7.62)
NEUTS SEG NFR BLD AUTO: 80 % (ref 43–75)
NRBC BLD AUTO-RTO: 0 /100 WBCS
PLATELET # BLD AUTO: 120 THOUSANDS/UL (ref 149–390)
PMV BLD AUTO: 12 FL (ref 8.9–12.7)
POTASSIUM SERPL-SCNC: 3.3 MMOL/L (ref 3.5–5.3)
RBC # BLD AUTO: 3.4 MILLION/UL (ref 3.88–5.62)
SODIUM SERPL-SCNC: 149 MMOL/L (ref 135–147)
WBC # BLD AUTO: 5.28 THOUSAND/UL (ref 4.31–10.16)

## 2022-11-19 RX ORDER — POTASSIUM CHLORIDE 20 MEQ/1
20 TABLET, EXTENDED RELEASE ORAL ONCE
Status: COMPLETED | OUTPATIENT
Start: 2022-11-19 | End: 2022-11-19

## 2022-11-19 RX ORDER — POTASSIUM CHLORIDE 20 MEQ/1
40 TABLET, EXTENDED RELEASE ORAL DAILY
Status: DISCONTINUED | OUTPATIENT
Start: 2022-11-20 | End: 2022-11-21

## 2022-11-19 RX ADMIN — CYANOCOBALAMIN TAB 500 MCG 500 MCG: 500 TAB at 12:11

## 2022-11-19 RX ADMIN — WARFARIN SODIUM 5 MG: 5 TABLET ORAL at 12:11

## 2022-11-19 RX ADMIN — BUSPIRONE HYDROCHLORIDE 5 MG: 5 TABLET ORAL at 12:11

## 2022-11-19 RX ADMIN — ESCITALOPRAM OXALATE 5 MG: 5 TABLET, FILM COATED ORAL at 12:11

## 2022-11-19 RX ADMIN — BUSPIRONE HYDROCHLORIDE 5 MG: 5 TABLET ORAL at 18:20

## 2022-11-19 RX ADMIN — PANTOPRAZOLE SODIUM 40 MG: 40 TABLET, DELAYED RELEASE ORAL at 05:42

## 2022-11-19 RX ADMIN — METOPROLOL SUCCINATE 50 MG: 50 TABLET, EXTENDED RELEASE ORAL at 12:12

## 2022-11-19 RX ADMIN — POTASSIUM CHLORIDE 30 MEQ: 1500 TABLET, EXTENDED RELEASE ORAL at 12:11

## 2022-11-19 RX ADMIN — FLUTICASONE PROPIONATE 1 SPRAY: 50 SPRAY, METERED NASAL at 12:18

## 2022-11-19 RX ADMIN — B-COMPLEX W/ C & FOLIC ACID TAB 1 TABLET: TAB at 12:11

## 2022-11-19 RX ADMIN — FOLIC ACID 1 MG: 1 TABLET ORAL at 12:18

## 2022-11-19 RX ADMIN — ENOXAPARIN SODIUM 90 MG: 100 INJECTION SUBCUTANEOUS at 20:16

## 2022-11-19 RX ADMIN — ENOXAPARIN SODIUM 90 MG: 100 INJECTION SUBCUTANEOUS at 12:11

## 2022-11-19 RX ADMIN — POTASSIUM CHLORIDE 20 MEQ: 1500 TABLET, EXTENDED RELEASE ORAL at 20:16

## 2022-11-19 RX ADMIN — THIAMINE HCL TAB 100 MG 100 MG: 100 TAB at 12:11

## 2022-11-19 NOTE — ASSESSMENT & PLAN NOTE
· Previous admission from the end of September until the end of October at [facility] in which he was hospitalized for over a month for severe anxiety and inability to care for self  · Per sister reportedly was able to ambulate prior to the hospitalization but after the hospitalization patient needing assistance with ambulation at home  · During hospitalization there patient developed hyponatremia during his course of stay and Zoloft that had been started was stopped due to hyponatremia  · PRN lorazepam ordered   · Recently discharged from inpatient psychiatry with mirtazapine and aripiprazole, discussed resumption of thesewith the patient but he reports excess sedation with these  · D/w psychiatry earlier this week started buspar 5mg BID, and lexapro  · He does admit to depressive symptoms and anxiety, discussing certain topics with him result is worsening tremor  · Monitor response to antidepressants  · Re consult psych for re-evaluation / possible IP psych admission for [facility]

## 2022-11-19 NOTE — ASSESSMENT & PLAN NOTE
Per chart review patient has had low TSH and elevated free T4 in the past   Most recent labs during this admission with TSH 0 282 f t4 1 56  thyroid U/S during last admission at [facility] was unremarkable   Has seen Endo in the past during previous admission at [facility] (oct 22) and they recommend get repeat TFT, TSI and thyroid abs   · Endo consulted, appreciate reccs  · Repeat thyroid tests in 4-6 weeks

## 2022-11-19 NOTE — CONSULTS
TeleConsultation - [address] Moses 58 y o  male MRN: [id number]  [facility] Encounter: [id number]        REQUIRED DOCUMENTATION:     1  This service was provided via Telemedicine  2  Provider located at [facility]   3  TeleMed provider: Michelet Small MD   4  Identify all parties in room with patient during tele consult: Patient   5  Patient was then informed that this was a Telemedicine visit and that the exam was being conducted confidentially over secure lines  My office door was closed  No one else was in the room  Patient acknowledged consent and understanding of privacy and security of the Telemedicine visit, and gave us permission to have the assistant stay in the room in order to assist with the history and to conduct the exam   I informed the patient that I have reviewed their record in Epic and presented the opportunity for them to ask any questions regarding the visit today  The patient agreed to participate  Discussed with Annie Parr MD    Assessment/Plan     Active Problems:    SHIMON (generalized anxiety disorder)    Atrial fibrillation (HCC)    Antiphospholipid antibody with hypercoagulable state ([address] )    Tremor of right hand    CVA (cerebral vascular accident) ([facility] )    Severe protein-calorie malnutrition ([facility] )    Hyperthyroidism    Impaired decision making    Assessment:  Sarai Eric is a 57 y/o male with PMH significant for SHIMON, Antiphospholipid Syndrome, and CVA, and Cognitive Impairment that presented for Fatigue  Patient with continued symptoms of fatigue that previously required IP psychiatric admission and given no observed medical cause recommend voluntary if not involuntary IP psychiatric admission       Generalized Anxiety Disorder    Treatment Plan:    Planned Medication Changes:    -Start Bupropion  mg qam     Current Medications:     Current Facility-Administered Medications   Medication Dose Route Frequency Provider Last Rate   • busPIRone  5 mg Oral BID True Cohen MD     • vitamin B-12  500 mcg Oral Daily Jasmit Kaylie, DO     • cyanocobalamin  1,000 mcg Intramuscular Q30 Days Jasmit Kaylie, DO     • enoxaparin  1 mg/kg Subcutaneous Q12H Crystal Maurer MD     • escitalopram  5 mg Oral Daily Jasmit Kaylie, DO     • fluticasone  1 spray Each Nare Daily Jasmit Kaylie, DO     • folic acid  1 mg Oral Daily True Cohen MD     • LORazepam  0 5 mg Oral Q8H PRN True Cohen MD     • metoprolol succinate  50 mg Oral Daily Evaristo Dakins, MD     • multivitamin stress formula  1 tablet Oral Daily True Cohen MD     • pantoprazole  40 mg Oral Early Morning Jasmit Kaylie, DO     • potassium chloride  30 mEq Oral Daily Evaristo Dakins, MD     • senna  1 tablet Oral HS PRN Evaristo Dakins, MD     • sodium chloride  1 spray Each Nare Q1H PRN Evaristo Dakins, MD     • thiamine  100 mg Oral Daily True Cohen MD     • warfarin  5 mg Oral QAM Evaristo Dakins, MD         Risks / Benefits of Treatment:    Risks, benefits, and possible side effects of medications explained to patient and patient verbalizes understanding        Inpatient consult to Psychiatry  Consult performed by: Alexander Díaz MD  Consult ordered by: Evaristo Dakins, MD        Physician Requesting Consult: Evaristo Dakins,*  Principal Problem:Rhabdomyolysis    Reason for Consult: Psych Evaluation       History of Present Illness      Patient is a limited historian and majority of information obtained from chart review and limited clinical exam        Psychiatric Review Of Systems:  sleep: no  appetite changes: yes  weight changes: no  energy/anergy: no  interest/pleasure/anhedonia: no  somatic symptoms: no  anxiety/panic:  Anxious  jasson: no  guilty/hopeless: no  self injurious behavior/risky behavior: no    Past Psychiatric History:  Per Initial Consult by Fredy Rangel MD    He has a history anxiety disorder, depression , had a prior psych admission [address] [place] [address] on 2014 and [address] on September 2022  Currently in treatment with Dr Lamar Estrella  Past Suicide attempts:  None  Past Violent behavior:  None  Past Psychiatric medication trial:  Lexapro, Atarax, Abilify, Ativan, Remeron, melatonin     Substance Abuse History:  Rarely  alcohol use in the past, never use drugs     I have assessed this patient for substance use within the past 12 months     History of IP/OP rehabilitation program:  None  Smoking history:  Never smoked  Family Psychiatric History:   His sister  has a history of depression and anxiety, no history of suicidal attempt in the family  And no history of substance abuse in the family     Social History  Education: some college  Learning Disabilities: none  Marital history:   Living arrangement, social support: he lives with his sister  Occupational History: on permanent disability  Functioning Relationships: good support system    Other Pertinent History: no legal or  history      Traumatic History:   Abuse: none  Other Traumatic Events: none       Past Medical History:   Diagnosis Date   • Aneurysm of thoracic aorta     last assessed 11/20/17   • Anxiety     currently on no meds   • Arthritis    • Bilateral inguinal hernia    • Cardiac disease    • Cognitive impairment    • CVA (cerebral vascular accident) ([address] )    • Depression    • GERD (gastroesophageal reflux disease)    • Hearing loss of aging    • Heart disease    • Hyperlipidemia    • Hypertension    • Irritable bowel syndrome    • Kidney stone    • Obesity    • Occasional tremors     left arm since stroke   • Palpitations    • Panic attack    • PONV (postoperative nausea and vomiting)     and headache x 3 days   • Psychiatric illness    • Sciatica     right and left  side   • Shortness of breath     on exertion   • Sleep apnea     is not using a CPAP   • Sleep difficulties    • Spitting up blood 05/21/2021 Resolved   • Status post placement of implantable loop recorder    • Stroke [facility]) 11/2014   • Stroke ([facility] [address] ) 03/2021   • TIA (transient ischemic attack)        Medical Review Of Systems:    Review of Systems    Meds/Allergies     all current active meds have been reviewed  Allergies   Allergen Reactions   • Losartan Angioedema   • Aspirin Fatigue     Due to blood thinners     • Bactrim [Sulfamethoxazole-Trimethoprim]    • Eliquis [Apixaban] Other (See Comments)     Failed  Had embolic CVA   • Lisinopril      Felt bad, was OK with Zestril brand name  • Tramadol        Objective     Vital signs in last 24 hours:  Temp:  [97 5 °F (36 4 °C)-99 5 °F (37 5 °C)] 97 5 °F (36 4 °C)  HR:  [] 95  Resp:  [16-18] 18  BP: (113-158)/() 158/109      Intake/Output Summary (Last 24 hours) at 11/18/2022 1917  Last data filed at 11/18/2022 1230  Gross per 24 hour   Intake 480 ml   Output 550 ml   Net -70 ml       Mental Status Evaluation:    Appearance:  age appropriate   Behavior:  normal   Speech:  soft   Mood:  normal   Affect:  constricted   Language: naming objects   Thought Process:  concrete   Associations intact associations   Thought Content:  normal   Perceptual Disturbances: None   Risk Potential: Suicidal Ideations none  Homicidal Ideations none  Potential for Aggression No   Sensorium:  person, place and time/date   Cognition:  recent and remote memory grossly intact   Consciousness:  alert    Attention: attention span and concentration were age appropriate   Intellect: within normal limits   Fund of Knowledge: awareness of current events: President   Insight:  fair   Judgment: limited   Muscle Strength:  Muscle Tone: normal NFT  normal   Gait/Station: normal gait/station   Motor Activity: no abnormal movements       Lab Results: I have personally reviewed all pertinent laboratory/tests results       Most Recent Labs:   Lab Results   Component Value Date    WBC 6 64 11/14/2022    RBC 3 78 (L) 11/14/2022 HGB 11 7 (L) 11/14/2022    HCT 37 0 11/14/2022     11/14/2022    RDW 15 7 (H) 11/14/2022    NEUTROABS 6 15 11/10/2022    SODIUM 147 11/16/2022    K 3 6 11/16/2022     (H) 11/16/2022    CO2 23 11/16/2022    BUN 14 11/16/2022    CREATININE 0 74 11/16/2022    GLUC 99 11/16/2022    GLUF 119 (H) 10/17/2022    CALCIUM 8 4 11/16/2022    AST 37 11/11/2022    ALT 52 11/11/2022    ALKPHOS 71 11/11/2022    TP 5 2 (L) 11/11/2022    ALB 2 2 (L) 11/11/2022    TBILI 1 03 (H) 11/11/2022    CHOLESTEROL 120 09/30/2022    HDL 34 (L) 09/30/2022    TRIG 90 09/30/2022    LDLCALC 68 09/30/2022    NONHDLC 86 09/30/2022    AMMONIA <10 (L) 11/03/2022    [id number] 0 753 11/10/2022    FREET4 1 60 (H) 11/10/2022    T3FREE 1 21 (L) 11/10/2022    RPR Non-Reactive (q 11/01/2014    HGBA1C 5 3 05/02/2021     05/02/2021       Imaging Studies: XR chest 1 view portable    Result Date: 11/4/2022  Narrative: CHEST INDICATION:   pna?  COMPARISON:  09/21/2022 EXAM PERFORMED/VIEWS:  XR CHEST PORTABLE 1 image FINDINGS: Cardiomediastinal silhouette appears enlarged  A median sternotomy has been performed  A loop recorder overlies the left lung base  There is no evidence of heart failure  The lungs are clear  No pneumothorax or pleural effusion  Osseous structures appear within normal limits for patient age  Impression: No acute cardiopulmonary disease  Workstation performed: [id number]     XR abdomen 1 view kub    Result Date: 11/11/2022  Narrative: ABDOMEN INDICATION:   distended abd  COMPARISON:  7/28/2022 VIEWS:  AP supine Images: 3 FINDINGS: Distended air-filled colon through to the level of the rectosigmoid  Paucity of air within the rectum  This is suggestive of possible stricture or partial obstruction at the distal rectosigmoid  Correlation with CT recommended  Extensive fecal debris in  the right colon  No discernible free air on this supine study    Upright or left lateral decubitus imaging is more sensitive to detect subtle free air in the appropriate setting  No pathologic calcifications or soft tissue masses  Visualized lung bases are clear  Visualized osseous structures are unremarkable for the patient's age  Impression: Dilated air-filled colon through the level of the rectosigmoid with pattern suggesting distal stricture or partial obstruction  CT recommended Workstation performed: [id number]     CT head without contrast    Result Date: 11/3/2022  Narrative: CT BRAIN - WITHOUT CONTRAST INDICATION:   AMS, weakness  COMPARISON:  CT head 4/19/2022, MRI brain 8/29/2022 TECHNIQUE:  CT examination of the brain was performed  In addition to axial images, sagittal and coronal 2D reformatted images were created and submitted for interpretation  Radiation dose length product (DLP) for this visit:  882 mGy-cm   This examination, like all CT scans performed in the [facility], was performed utilizing techniques to minimize radiation dose exposure, including the use of iterative reconstruction and automated exposure control  IMAGE QUALITY:  Diagnostic  FINDINGS: PARENCHYMA:  No intracranial mass, mass effect or midline shift  No CT signs of acute infarction  No acute parenchymal hemorrhage  Again seen is focal encephalomalacia and gliosis involving the right occipital lobe and bilateral cerebelli  Again seen are chronic lacunar infarcts in bilateral basal ganglia  Periventricular moderate patchy low-attenuation is again seen, grossly unchanged in extent compared to prior MRI brain 8/29/2022 VENTRICLES AND EXTRA-AXIAL SPACES:  Normal for the patient's age  VISUALIZED ORBITS AND PARANASAL SINUSES:  Unremarkable  CALVARIUM AND EXTRACRANIAL SOFT TISSUES:  Normal      Impression: No acute intracranial abnormality  Encephalomalacia involving the right occipital lobe and bilateral cerebelli    Grossly stable appearance of periventricular hypoattenuation compared to MRI brain 8/29/2022, likely representing chronic microvascular ischemic changes  The study was marked in [facility] for immediate notification  Workstation performed: [id number]     CT abdomen pelvis w contrast    Result Date: 11/11/2022  Narrative: CT ABDOMEN AND PELVIS WITH IV CONTRAST INDICATION:   Bowel obstruction suspected FOLLOW UP OBSTRUCTION ON XRAY  COMPARISON:  KUB from earlier today  TECHNIQUE:  CT examination of the abdomen and pelvis was performed  Axial, sagittal, and coronal 2D reformatted images were created from the source data and submitted for interpretation  Radiation dose length product (DLP) for this visit:  486 56 mGy-cm   This examination, like all CT scans performed in the [facility], was performed utilizing techniques to minimize radiation dose exposure, including the use of iterative  reconstruction and automated exposure control  IV Contrast:  95 mL of iohexol (OMNIPAQUE) Enteric Contrast:  Enteric contrast was not administered  FINDINGS: ABDOMEN LOWER CHEST:  No clinically significant abnormality identified in the visualized lower chest  LIVER/BILIARY TREE:  Unremarkable  GALLBLADDER:  Gallbladder is surgically absent  SPLEEN:  Subcentimeter hypodensity unchanged from the prior study is likely a small cyst or hemangioma  PANCREAS:  Unremarkable  ADRENAL GLANDS:  Unremarkable  KIDNEYS/URETERS:  Unchanged left renal cyst  STOMACH AND BOWEL:  Mildly distended diffusely air and fluid-filled colon without obstruction in keeping with a nonspecific diarrheal illness  APPENDIX:  No findings to suggest appendicitis  ABDOMINOPELVIC CAVITY:  No ascites  No pneumoperitoneum  No lymphadenopathy  VESSELS:  Unremarkable for patient's age  PELVIS REPRODUCTIVE ORGANS:  Unremarkable for patient's age  URINARY BLADDER:  Small dot of intraluminal gas likely secondary to recent implementation  ABDOMINAL WALL/INGUINAL REGIONS:  Unremarkable  OSSEOUS STRUCTURES:  No acute fracture or destructive osseous lesion       Impression: Mildly distended fluid and air filled colon without obstruction in keeping with a nonspecific diarrheal illness  Workstation performed: [id number]     EKG/Pathology/Other Studies:   Lab Results   Component Value Date    VENTRATE 99 11/03/2022    ATRIALRATE 94 11/03/2022    PRINT 150 11/03/2022    QRSDINT 78 11/03/2022    QTINT 314 11/03/2022    QTCINT 402 11/03/2022    PAXIS 109 11/03/2022    QRSAXIS 38 11/03/2022    TWAVEAXIS 234 11/03/2022        Code Status: Level 3 - DNAR and DNI  Advance Directive and Living Will:      Power of :    POLST:      Counseling / Coordination of Care: Total floor / unit time spent today 30 minutes  Greater than 50% of total time was spent with the patient and / or family counseling and / or coordination of care  A description of the counseling / coordination of care: Direct Patient Care, Chart Review, and Documentation

## 2022-11-19 NOTE — ASSESSMENT & PLAN NOTE
Will target INR between 2 5 in 3 5 for hypercoagulable state  · Continue lovenox to warfarin bridge  · Switched to once daily metroplol XL 50 qd to ease pill burden

## 2022-11-19 NOTE — CASE MANAGEMENT
Case Management Assessment & Discharge Planning Note    Patient name Luther Begin  Location [facility] 907/[facility] 446-22 MRN [id number]  : 1960 Date 2022       Current Admission Date: 11/3/2022  Current Admission Diagnosis:Atrial fibrillation [facility])   Patient Active Problem List    Diagnosis Date Noted   • Impaired decision making 2022   • Hyperthyroidism 11/10/2022   • Severe protein-calorie malnutrition ([address] ) 2022   • Hyponatremia 10/14/2022   • SIADH (syndrome of inappropriate ADH production) ([facility] ) 10/14/2022   • Subclinical hyperthyroidism 10/14/2022   • Sinus arrhythmia 10/10/2022   • Mild protein-calorie malnutrition ([facility] ) 10/07/2022   • Medical clearance for incarceration 2022   • 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 attack 2022   • Atypical chest pain 2022   • Hypokalemia 2022   • Dizziness 2022   • Renal cyst 2022   • PVD (peripheral vascular disease) ([facility] ) 2022   • Ataxia 2022   • CVA (cerebral vascular accident) ([facility] ) 2022   • Right inguinal pain 2022   • Alkaline phosphatase elevation 2022   • Low back pain 2022   • S/P laparoscopic hernia repair 2021   • Non-recurrent bilateral inguinal hernia without obstruction or gangrene 2021   • Gastric polyps 2021   • Gastric AVM 2021   • Edema of both lower legs 2021   • Tremor of right hand 2021   • Weakness of both lower extremities 2021   • Anticoagulated on Coumadin 2021   • CORIE (obstructive sleep apnea)    • Hyperbilirubinemia 08/10/2020   • Antiphospholipid antibody with hypercoagulable state ([facility] ) 2020   • History of stroke 2020   • Other viral warts 2019   • Physical deconditioning 2019   • Class 2 obesity in adult 2018   • Bright red blood per rectum 11/10/2017   • Numbness 2017   • H/O aortic aneurysm repair 2017   • Hypertension 2017   • GERD (gastroesophageal reflux disease) 08/26/2017   • Hyperlipidemia 03/17/2017   • Peyronie disease 12/09/2016   • Vitamin D deficiency 06/15/2016   • Atrial fibrillation ([address] ) 11/17/2014   • SHIMON (generalized anxiety disorder) 11/11/2014   • Constipation 09/19/2014   • Irritable bowel syndrome 04/24/2014   • Kidney stones 04/24/2014      LOS (days): 16  Geometric Mean LOS (GMLOS) (days): 5 00  Days to GMLOS:-10 5     OBJECTIVE:  PATIENT READMITTED TO HOSPITAL  Risk of Unplanned Readmission Score: 29 05         Current admission status: Inpatient       Preferred Pharmacy:   [facility] [address] [facility], [address] [facility] [address]  Phone: [phone] Fax: [phone]    Primary Care Provider: Nena Stevens MD    Primary Insurance: [employer]  Secondary Insurance:     ASSESSMENT:  [address] [place] [address] Representative - [facility]   Primary Phone: [phone] (Mobile)  Home Phone: [phone]                                                                DISCHARGE DETAILS:    Discharge planning discussed with[de-identified] Pt reviewed with the medical team  PT recommends inpt rehab  Alice Krishnan is following Pt while the [facility] decides on optioned determination  Psych following the patient and recommended inpatient BHU  The medical team informed the patient that he would most likely need to be discharged to inpatient physical rehab before being admitted to inpatient BHU; however, PT can continue to work with the patient until pt gain strength and are able to discharge to [facility]  CM will remain available for discharged planning needs

## 2022-11-19 NOTE — PROGRESS NOTES
[address] [facility]  Progress Note - Pelon Briscoe 1960, 58 y o  male MRN: [id number]  Unit/Bed#: [facility] 907-01 Encounter: [id number]  Primary Care Provider: Whitney Marino MD   Date and time admitted to hospital: 11/3/2022  3:28 PM    Impaired decision making  Assessment & Plan  Per Neuropsych eval 11/14 patient does NOT have capacity to make medical decisions   Patient also has some level of cognitive impairment   B12 borderline low   · B12 IM shot x 1 dose   · Start b12 supplements    Hyperthyroidism  Assessment & Plan  Per chart review patient has had low TSH and elevated free T4 in the past   Most recent labs during this admission with TSH 0 282 f t4 1 56  thyroid U/S during last admission at [address] [place] [address] was unremarkable   Has seen Endo in the past during previous admission at [address] [place] [address] (oct 22) and they recommend get repeat TFT, TSI and thyroid abs   · Endo consulted, appreciate reccs  · Repeat thyroid tests in 4-6 weeks     Severe protein-calorie malnutrition ([address] )  Assessment & Plan  Malnutrition Findings:   Adult Malnutrition type: Chronic illness  Adult Degree of Malnutrition: Other severe protein calorie malnutrition  Malnutrition Characteristics: Inadequate energy, Weight loss                  360 Statement: Severe Pro/cathryn malnutrition r/t inadequate intake/anxiety as evidenced by 28% unplanned weight loss since 5/3/22, consuming < 75% energy intake compared to estimated enrgy needs > 1 month  Treatment TBD s/p swallow eval    BMI Findings: Body mass index is 29 1 kg/m²  Po intake improving     CVA (cerebral vascular accident) ([facility] )  Assessment & Plan  -Hx CVA x 3 in setting of antiphospholipid syndrome  -on PTA warfarin although noted supratherapeutic INR  -CT head in ER: No acute intracranial abnormality  Encephalomalacia involving the right occipital lobe and bilateral cerebelli    Grossly stable appearance of periventricular hypoattenuation compared to MRI brain 8/29/2022, likely representing chronic microvascular ischemic changes  PT/ ot consulted and recommending rehab    Tremor of right hand  Assessment & Plan  · Outpatient follow-up with Neurology  No inpatient intervention as per Neurology  Will follow-up with Dr Jose Villaseñor as outpatient  Patient known to him for prior stroke      Antiphospholipid antibody with hypercoagulable state ([facility] [facility] )  Assessment & Plan  -history of antiphospholipid antibody on PTA Coumadin  -had ischemic CVA despite being on Eliquis in the past  -restarting warfarin as noted above  -inr is subtherapeutic, will continue bridge with lovenox    Atrial fibrillation [facility])  Assessment & Plan  Will target INR between 2 5 in 3 5 for hypercoagulable state  · Continue lovenox to warfarin bridge  · Switched to once daily metroplol XL 50 qd to ease pill burden    SHIMON (generalized anxiety disorder)  Assessment & Plan  · Previous admission from the end of September until the end of October at [facility] in which he was hospitalized for over a month for severe anxiety and inability to care for self  · Per sister reportedly was able to ambulate prior to the hospitalization but after the hospitalization patient needing assistance with ambulation at home  · During hospitalization there patient developed hyponatremia during his course of stay and Zoloft that had been started was stopped due to hyponatremia  · PRN lorazepam ordered   · Recently discharged from inpatient psychiatry with mirtazapine and aripiprazole, discussed resumption of thesewith the patient but he reports excess sedation with these  · D/w psychiatry earlier this week started buspar 5mg BID, and lexapro  · He does admit to depressive symptoms and anxiety, discussing certain topics with him result is worsening tremor  · Monitor response to antidepressants  · Re consult psych for re-evaluation / possible IP psych admission for [facility]           VTE Pharmacologic Prophylaxis: VTE Score: 3 Moderate Risk (Score 3-4) - Pharmacological DVT Prophylaxis Ordered: enoxaparin (Lovenox)  Patient Centered Rounds: Discussed with RN  Discussions with Specialists or Other Care Team Provider:  None today    Education and Discussions with Family / Patient: Updated  (sister) at bedside  Time Spent for Care: 30 minutes  More than 50% of total time spent on counseling and coordination of care as described above  Current Length of Stay: 16 day(s)  Current Patient Status: Inpatient   Certification Statement: The patient will continue to require additional inpatient hospital stay due to As above  Discharge Plan: Anticipate discharge in >72 hrs to discharge location to be determined pending rehab evaluations  Code Status: Level 3 - DNAR and DNI    Subjective:   More talkative with sister at bedside but still quite withdrawn  At times very anxious and shaking especially when talking about taking medications  Sister states she does not want patient to go to inpatient psych  Would not elaborate on why he does not like to take medications  Objective:     Vitals:   Temp (24hrs), Av 7 °F (36 5 °C), Min:97 5 °F (36 4 °C), Max:97 9 °F (36 6 °C)    Temp:  [97 5 °F (36 4 °C)-97 9 °F (36 6 °C)] 97 7 °F (36 5 °C)  HR:  [] 105  Resp:  [18-20] 20  BP: (144-165)/(90-94) 144/94  SpO2:  [96 %-97 %] 96 %  Body mass index is 29 1 kg/m²  Input and Output Summary (last 24 hours): Intake/Output Summary (Last 24 hours) at 2022 1841  Last data filed at 2022 1835  Gross per 24 hour   Intake 118 ml   Output --   Net 118 ml       Physical Exam:   Physical Exam  Vitals reviewed  Constitutional:       General: He is not in acute distress  Appearance: He is not toxic-appearing  HENT:      Mouth/Throat:      Mouth: Mucous membranes are moist    Eyes:      General: No scleral icterus  Cardiovascular:      Rate and Rhythm: Normal rate and regular rhythm     Pulmonary:      Effort: Pulmonary effort is normal  No respiratory distress  Breath sounds: Normal breath sounds  Abdominal:      General: Bowel sounds are normal       Palpations: Abdomen is soft  Tenderness: There is no abdominal tenderness  Musculoskeletal:      Right lower leg: No edema  Left lower leg: No edema  Skin:     General: Skin is warm and dry  Neurological:      General: No focal deficit present  Mental Status: He is alert and oriented to person, place, and time  Psychiatric:      Comments: Answers questions delayed, not very verbose, shaking and increased work of breathing when discussing certain issues            Additional Data:     Labs:  Results from last 7 days   Lab Units 11/19/22  0605   WBC Thousand/uL 5 28   HEMOGLOBIN g/dL 10 7*   HEMATOCRIT % 34 1*   PLATELETS Thousands/uL 120*   NEUTROS PCT % 80*   LYMPHS PCT % 13*   MONOS PCT % 6   EOS PCT % 1     Results from last 7 days   Lab Units 11/19/22  0605   SODIUM mmol/L 149*   POTASSIUM mmol/L 3 3*   CHLORIDE mmol/L 119*   CO2 mmol/L 23   BUN mg/dL 19   CREATININE mg/dL 0 70   ANION GAP mmol/L 7   CALCIUM mg/dL 8 5   GLUCOSE RANDOM mg/dL 96     Results from last 7 days   Lab Units 11/18/22  0514   INR  1 10     Results from last 7 days   Lab Units 11/15/22  0801   POC GLUCOSE mg/dl 98               Lines/Drains:  Invasive Devices     Peripheral Intravenous Line  Duration           Peripheral IV 11/19/22 Dorsal (posterior); Left Hand <1 day          Drain  Duration           External Urinary Catheter 4 days                      Imaging: No pertinent imaging reviewed      Recent Cultures (last 7 days):         Last 24 Hours Medication List:   Current Facility-Administered Medications   Medication Dose Route Frequency Provider Last Rate   • busPIRone  5 mg Oral BID Shauna Aponte MD     • vitamin B-12  500 mcg Oral Daily Tana Lott DO     • cyanocobalamin  1,000 mcg Intramuscular Q30 Days Tana Lott DO     • enoxaparin  1 mg/kg Subcutaneous Q12H [facility] Ruel Cely White MD     • escitalopram  5 mg Oral Daily Jasmit Kaylie, DO     • fluticasone  1 spray Each Nare Daily Jasmit Kaylie, DO     • folic acid  1 mg Oral Daily Praveena Thomas MD     • LORazepam  0 5 mg Oral Q8H PRN Praveena Thomas MD     • metoprolol succinate  50 mg Oral Daily Verónica Garcia MD     • multivitamin stress formula  1 tablet Oral Daily Praveena Thomas MD     • pantoprazole  40 mg Oral Early Morning Jasmit Kaylie, DO     • potassium chloride  20 mEq Oral Once Verónica Garcia MD     • [START ON 11/20/2022] potassium chloride  40 mEq Oral Daily Verónica Garcia MD     • senna  1 tablet Oral HS PRN Verónica Garcia MD     • sodium chloride  1 spray Each Nare Q1H PRN Verónica Garcia MD     • thiamine  100 mg Oral Daily Praveena Thomas MD     • warfarin  5 mg Oral QAM Verónica Garcia MD          Today, Patient Was Seen By: Verónica Garcia MD    **Please Note: This note may have been constructed using a voice recognition system  **

## 2022-11-20 LAB
INR PPP: 1.15 (ref 0.84–1.19)
PROTHROMBIN TIME: 14.9 SECONDS (ref 11.6–14.5)

## 2022-11-20 RX ORDER — METOPROLOL SUCCINATE 50 MG/1
50 TABLET, EXTENDED RELEASE ORAL 2 TIMES DAILY
Status: DISCONTINUED | OUTPATIENT
Start: 2022-11-20 | End: 2022-11-20

## 2022-11-20 RX ORDER — METOPROLOL SUCCINATE 100 MG/1
100 TABLET, EXTENDED RELEASE ORAL DAILY
Status: DISCONTINUED | OUTPATIENT
Start: 2022-11-21 | End: 2022-12-09 | Stop reason: HOSPADM

## 2022-11-20 RX ADMIN — ESCITALOPRAM OXALATE 5 MG: 5 TABLET, FILM COATED ORAL at 11:55

## 2022-11-20 RX ADMIN — BUSPIRONE HYDROCHLORIDE 5 MG: 5 TABLET ORAL at 18:38

## 2022-11-20 RX ADMIN — ENOXAPARIN SODIUM 90 MG: 100 INJECTION SUBCUTANEOUS at 23:52

## 2022-11-20 RX ADMIN — FLUTICASONE PROPIONATE 1 SPRAY: 50 SPRAY, METERED NASAL at 11:58

## 2022-11-20 RX ADMIN — FOLIC ACID 1 MG: 1 TABLET ORAL at 11:56

## 2022-11-20 RX ADMIN — BUSPIRONE HYDROCHLORIDE 5 MG: 5 TABLET ORAL at 11:56

## 2022-11-20 RX ADMIN — CYANOCOBALAMIN TAB 500 MCG 500 MCG: 500 TAB at 11:56

## 2022-11-20 RX ADMIN — ENOXAPARIN SODIUM 90 MG: 100 INJECTION SUBCUTANEOUS at 11:55

## 2022-11-20 RX ADMIN — THIAMINE HCL TAB 100 MG 100 MG: 100 TAB at 11:55

## 2022-11-20 RX ADMIN — B-COMPLEX W/ C & FOLIC ACID TAB 1 TABLET: TAB at 11:55

## 2022-11-20 RX ADMIN — POTASSIUM CHLORIDE 40 MEQ: 1500 TABLET, EXTENDED RELEASE ORAL at 11:55

## 2022-11-20 RX ADMIN — WARFARIN SODIUM 5 MG: 5 TABLET ORAL at 11:58

## 2022-11-20 RX ADMIN — METOPROLOL SUCCINATE 50 MG: 50 TABLET, EXTENDED RELEASE ORAL at 11:56

## 2022-11-20 RX ADMIN — PANTOPRAZOLE SODIUM 40 MG: 40 TABLET, DELAYED RELEASE ORAL at 05:32

## 2022-11-20 NOTE — PROGRESS NOTES
[facility]  Progress Note - Talita Fischer 1960, 58 y o  male MRN: [id number]  [facility] Encounter: [id number]  Primary Care Provider: Suki Ceja MD   Date and time admitted to hospital: 11/3/2022  3:28 PM    Impaired decision making  Assessment & Plan  Per Neuropsych eval 11/14 patient does NOT have capacity to make medical decisions   Patient also has some level of cognitive impairment   B12 borderline low   · B12 IM shot x 1 dose   · Start b12 supplements    Hyperthyroidism  Assessment & Plan  Per chart review patient has had low TSH and elevated free T4 in the past   Most recent labs during this admission with TSH 0 282 f t4 1 56  thyroid U/S during last admission at [facility] was unremarkable   Has seen Endo in the past during previous admission at [facility] (oct 22) and they recommend get repeat TFT, TSI and thyroid abs   · Endo consulted, appreciate reccs  · Repeat thyroid tests in 4-6 weeks     Severe protein-calorie malnutrition ([address] )  Assessment & Plan  Malnutrition Findings:   Adult Malnutrition type: Chronic illness  Adult Degree of Malnutrition: Other severe protein calorie malnutrition  Malnutrition Characteristics: Inadequate energy, Weight loss                  360 Statement: Severe Pro/cathryn malnutrition r/t inadequate intake/anxiety as evidenced by 28% unplanned weight loss since 5/3/22, consuming < 75% energy intake compared to estimated enrgy needs > 1 month  Treatment TBD s/p swallow eval    BMI Findings: Body mass index is 29 1 kg/m²  Po intake improving     Hypernatremia  Assessment & Plan  -On presentation: sodium 151, DAYAN, rhabdo on admission   -Note Nephrology input  -initial thought process was this was a hypovolemic hypernatremia  -CT Head: No acute intracranial abnormality  Encephalomalacia involving the right occipital lobe and bilateral cerebelli    Grossly stable appearance of periventricular hypoattenuation compared to MRI brain 8/29/2022, likely representing chronic microvascular ischemic changes  Noted to recur on labs 11/19 to 149, declined further labs today but in agreement to drink a lot of water and we will recheck tmrw  CVA (cerebral vascular accident) ([facility] )  Assessment & Plan  -Hx CVA x 3 in setting of antiphospholipid syndrome  -on PTA warfarin although noted supratherapeutic INR  -CT head in ER: No acute intracranial abnormality  Encephalomalacia involving the right occipital lobe and bilateral cerebelli  Grossly stable appearance of periventricular hypoattenuation compared to MRI brain 8/29/2022, likely representing chronic microvascular ischemic changes  PT/ ot consulted and recommending rehab    Tremor of right hand  Assessment & Plan  · Outpatient follow-up with Neurology  No inpatient intervention as per Neurology  Will follow-up with Dr Jon Mullins as outpatient  Patient known to him for prior stroke      Antiphospholipid antibody with hypercoagulable state ([facility] [address] )  Assessment & Plan  -history of antiphospholipid antibody on PTA Coumadin  -had ischemic CVA despite being on Eliquis in the past  -restarting warfarin as noted above  -inr is subtherapeutic, will continue bridge with lovenox  -fortunately pt has taken warfarin past few days, CTM INR    Atrial fibrillation [facility])  Assessment & Plan  Will target INR between 2 5 in 3 5 for hypercoagulable state  · Continue lovenox to warfarin bridge  · 11/18 - Switched to once daily metroplol XL 50 qd to ease pill burden  · 11/20 - increase to metop succ 100mg daily    SHIMON (generalized anxiety disorder)  Assessment & Plan  · Previous admission from the end of September until the end of October at [facility] in which he was hospitalized for over a month for severe anxiety and inability to care for self  · Per sister reportedly was able to ambulate prior to the hospitalization but after the hospitalization patient needing assistance with ambulation at home  · During hospitalization there patient developed hyponatremia during his course of stay and Zoloft that had been started was stopped due to hyponatremia  · PRN lorazepam ordered   · Recently discharged from inpatient psychiatry with mirtazapine and aripiprazole, discussed resumption of thesewith the patient but he reports excess sedation with these  · D/w psychiatry earlier this week started buspar 5mg BID, and lexapro  · He does admit to depressive symptoms and anxiety, discussing certain topics with him result is worsening tremor  · Monitor response to antidepressants  · Re consult psych for re-evaluation / possible IP psych admission for [facility] - per d/w sister on , doesn't want IP psych admission, will need to discuss options w psych when they are on-site tmrw as pt still quite withdrawn and anxious         VTE Pharmacologic Prophylaxis: VTE Score: 3 Moderate Risk (Score 3-4) - Pharmacological DVT Prophylaxis Ordered: enoxaparin (Lovenox)  Patient Centered Rounds: TT to RN  Discussions with Specialists or Other Care Team Provider: none    Education and Discussions with Family / Patient: Updated  (sister) via phone  Time Spent for Care: 30 minutes  More than 50% of total time spent on counseling and coordination of care as described above  Current Length of Stay: 17 day(s)  Current Patient Status: Inpatient   Certification Statement: The patient will continue to require additional inpatient hospital stay due to as above, placement discussion as well as hyperNa  Discharge Plan: Anticipate discharge in >72 hrs to discharge location to be determined pending rehab evaluations  Code Status: Level 3 - DNAR and DNI    Subjective:   Denies acute concerns  Less talkative than yest when sister was present but more talkative than Fri, Thurs which is good  Encouraged him to drink water to keep Na from going back up       Objective:     Vitals:   Temp (24hrs), Av 3 °F (36 3 °C), Min:96 6 °F (35 9 °C), Max:97 7 °F (36 5 °C)    Temp:  [96 6 °F (35 9 °C)-97 7 °F (36 5 °C)] 96 6 °F (35 9 °C)  HR:  [] 98  Resp:  [18-20] 18  BP: (133-144)/(83-94) 133/83  SpO2:  [96 %] 96 %  Body mass index is 29 1 kg/m²  Input and Output Summary (last 24 hours): Intake/Output Summary (Last 24 hours) at 11/20/2022 1255  Last data filed at 11/19/2022 1835  Gross per 24 hour   Intake 118 ml   Output --   Net 118 ml       Physical Exam:   Physical Exam  Vitals reviewed  Constitutional:       General: He is not in acute distress  Appearance: He is not toxic-appearing  HENT:      Mouth/Throat:      Mouth: Mucous membranes are moist    Eyes:      General: No scleral icterus  Pulmonary:      Effort: Pulmonary effort is normal  No respiratory distress  Musculoskeletal:      Right lower leg: No edema  Left lower leg: No edema  Skin:     General: Skin is warm and dry  Neurological:      General: No focal deficit present  Mental Status: He is alert and oriented to person, place, and time  Psychiatric:      Comments: Answers questions delayed, not very verbose, shaking and increased work of breathing when discussing certain issues         Additional Data:     Labs:  Results from last 7 days   Lab Units 11/19/22  0605   WBC Thousand/uL 5 28   HEMOGLOBIN g/dL 10 7*   HEMATOCRIT % 34 1*   PLATELETS Thousands/uL 120*   NEUTROS PCT % 80*   LYMPHS PCT % 13*   MONOS PCT % 6   EOS PCT % 1     Results from last 7 days   Lab Units 11/19/22  0605   SODIUM mmol/L 149*   POTASSIUM mmol/L 3 3*   CHLORIDE mmol/L 119*   CO2 mmol/L 23   BUN mg/dL 19   CREATININE mg/dL 0 70   ANION GAP mmol/L 7   CALCIUM mg/dL 8 5   GLUCOSE RANDOM mg/dL 96     Results from last 7 days   Lab Units 11/20/22  0527   INR  1 15     Results from last 7 days   Lab Units 11/15/22  0801   POC GLUCOSE mg/dl 98               Lines/Drains:  Invasive Devices     Peripheral Intravenous Line  Duration           Peripheral IV 11/19/22 Dorsal (posterior); Left Hand <1 day          Drain  Duration           External Urinary Catheter 4 days                      Imaging: No pertinent imaging reviewed  Recent Cultures (last 7 days):         Last 24 Hours Medication List:   Current Facility-Administered Medications   Medication Dose Route Frequency Provider Last Rate   • busPIRone  5 mg Oral BID Anuradha Meraz MD     • vitamin B-12  500 mcg Oral Daily Jasmit Kaylie, DO     • cyanocobalamin  1,000 mcg Intramuscular Q30 Days Jasmit Kaylie, DO     • enoxaparin  1 mg/kg Subcutaneous Q12H [facility] Anuradha Meraz MD     • escitalopram  5 mg Oral Daily Jasmit Kaylie, DO     • fluticasone  1 spray Each Nare Daily Jasmit Kaylie, DO     • folic acid  1 mg Oral Daily Anuradha Meraz MD     • LORazepam  0 5 mg Oral Q8H PRN Anuradha Meraz MD     • [START ON 11/21/2022] metoprolol succinate  100 mg Oral Daily Diya Ramos MD     • multivitamin stress formula  1 tablet Oral Daily Anuradha Meraz MD     • pantoprazole  40 mg Oral Early Morning Jasmit Kaylie, DO     • potassium chloride  40 mEq Oral Daily Diya Ramos MD     • senna  1 tablet Oral HS PRN Diya Ramos MD     • sodium chloride  1 spray Each Nare Q1H PRN Diya Ramos MD     • thiamine  100 mg Oral Daily Anuradha Meraz MD     • warfarin  5 mg Oral QAM Diya Ramos MD          Today, Patient Was Seen By: Diya Ramos MD    **Please Note: This note may have been constructed using a voice recognition system  **

## 2022-11-20 NOTE — ASSESSMENT & PLAN NOTE
-On presentation: sodium 151, DAYAN, rhabdo on admission   -Note Nephrology input  -initial thought process was this was a hypovolemic hypernatremia  -CT Head: No acute intracranial abnormality  Encephalomalacia involving the right occipital lobe and bilateral cerebelli  Grossly stable appearance of periventricular hypoattenuation compared to MRI brain 8/29/2022, likely representing chronic microvascular ischemic changes  Noted to recur on labs 11/19 to 149, declined further labs today but in agreement to drink a lot of water and we will recheck tmrw

## 2022-11-20 NOTE — ASSESSMENT & PLAN NOTE
-history of antiphospholipid antibody on PTA Coumadin  -had ischemic CVA despite being on Eliquis in the past  -restarting warfarin as noted above  -inr is subtherapeutic, will continue bridge with lovenox  -fortunately pt has taken warfarin past few days, CTM INR

## 2022-11-20 NOTE — ASSESSMENT & PLAN NOTE
· Outpatient follow-up with Neurology  No inpatient intervention as per Neurology  Will follow-up with Dr Sandra Ferguson as outpatient  Patient known to him for prior stroke

## 2022-11-20 NOTE — ASSESSMENT & PLAN NOTE
Will target INR between 2 5 in 3 5 for hypercoagulable state  · Continue lovenox to warfarin bridge  · 11/18 - Switched to once daily metroplol XL 50 qd to ease pill burden  · 11/20 - increase to metop succ 100mg daily

## 2022-11-20 NOTE — ASSESSMENT & PLAN NOTE
· Previous admission from the end of September until the end of October at [facility] in which he was hospitalized for over a month for severe anxiety and inability to care for self  · Per sister reportedly was able to ambulate prior to the hospitalization but after the hospitalization patient needing assistance with ambulation at home  · During hospitalization there patient developed hyponatremia during his course of stay and Zoloft that had been started was stopped due to hyponatremia  · PRN lorazepam ordered   · Recently discharged from inpatient psychiatry with mirtazapine and aripiprazole, discussed resumption of thesewith the patient but he reports excess sedation with these  · D/w psychiatry earlier this week started buspar 5mg BID, and lexapro  · He does admit to depressive symptoms and anxiety, discussing certain topics with him result is worsening tremor  · Monitor response to antidepressants  · Re consult psych for re-evaluation / possible IP psych admission for [facility] - per d/w sister on 11/19, doesn't want IP psych admission, will need to discuss options w psych when they are on-site tmrw as pt still quite withdrawn and anxious

## 2022-11-20 NOTE — ASSESSMENT & PLAN NOTE
Per chart review patient has had low TSH and elevated free T4 in the past   Most recent labs during this admission with TSH 0 282 f t4 1 56  thyroid U/S during last admission at [address] [place] [address] was unremarkable   Has seen Endo in the past during previous admission at [address] [place] [address] (oct 22) and they recommend get repeat TFT, TSI and thyroid abs   · Endo consulted, appreciate reccs  · Repeat thyroid tests in 4-6 weeks

## 2022-11-21 LAB
ANION GAP SERPL CALCULATED.3IONS-SCNC: 7 MMOL/L (ref 4–13)
BUN SERPL-MCNC: 12 MG/DL (ref 5–25)
CALCIUM SERPL-MCNC: 8.5 MG/DL (ref 8.3–10.1)
CHLORIDE SERPL-SCNC: 116 MMOL/L (ref 96–108)
CO2 SERPL-SCNC: 23 MMOL/L (ref 21–32)
CREAT SERPL-MCNC: 0.61 MG/DL (ref 0.6–1.3)
GFR SERPL CREATININE-BSD FRML MDRD: 107 ML/MIN/1.73SQ M
GLUCOSE SERPL-MCNC: 109 MG/DL (ref 65–140)
INR PPP: 1.23 (ref 0.84–1.19)
POTASSIUM SERPL-SCNC: 3 MMOL/L (ref 3.5–5.3)
PROTHROMBIN TIME: 15.7 SECONDS (ref 11.6–14.5)
SODIUM SERPL-SCNC: 146 MMOL/L (ref 135–147)

## 2022-11-21 RX ORDER — LORAZEPAM 0.5 MG/1
0.5 TABLET ORAL 2 TIMES DAILY
Status: DISCONTINUED | OUTPATIENT
Start: 2022-11-21 | End: 2022-12-09 | Stop reason: HOSPADM

## 2022-11-21 RX ORDER — POTASSIUM CHLORIDE 20 MEQ/1
40 TABLET, EXTENDED RELEASE ORAL 2 TIMES DAILY
Status: DISCONTINUED | OUTPATIENT
Start: 2022-11-21 | End: 2022-12-09 | Stop reason: HOSPADM

## 2022-11-21 RX ORDER — ESCITALOPRAM OXALATE 10 MG/1
10 TABLET ORAL DAILY
Status: DISCONTINUED | OUTPATIENT
Start: 2022-11-22 | End: 2022-12-09 | Stop reason: HOSPADM

## 2022-11-21 RX ADMIN — PANTOPRAZOLE SODIUM 40 MG: 40 TABLET, DELAYED RELEASE ORAL at 06:28

## 2022-11-21 RX ADMIN — THIAMINE HCL TAB 100 MG 100 MG: 100 TAB at 10:34

## 2022-11-21 RX ADMIN — B-COMPLEX W/ C & FOLIC ACID TAB 1 TABLET: TAB at 10:34

## 2022-11-21 RX ADMIN — ENOXAPARIN SODIUM 90 MG: 100 INJECTION SUBCUTANEOUS at 22:00

## 2022-11-21 RX ADMIN — BUSPIRONE HYDROCHLORIDE 5 MG: 5 TABLET ORAL at 10:35

## 2022-11-21 RX ADMIN — METOPROLOL SUCCINATE 100 MG: 100 TABLET, EXTENDED RELEASE ORAL at 10:34

## 2022-11-21 RX ADMIN — CYANOCOBALAMIN TAB 500 MCG 500 MCG: 500 TAB at 10:34

## 2022-11-21 RX ADMIN — FOLIC ACID 1 MG: 1 TABLET ORAL at 10:34

## 2022-11-21 RX ADMIN — BUSPIRONE HYDROCHLORIDE 5 MG: 5 TABLET ORAL at 18:47

## 2022-11-21 RX ADMIN — POTASSIUM CHLORIDE 40 MEQ: 1500 TABLET, EXTENDED RELEASE ORAL at 10:35

## 2022-11-21 RX ADMIN — WARFARIN SODIUM 5 MG: 5 TABLET ORAL at 10:34

## 2022-11-21 RX ADMIN — ESCITALOPRAM OXALATE 5 MG: 5 TABLET, FILM COATED ORAL at 10:34

## 2022-11-21 RX ADMIN — LORAZEPAM 0.5 MG: 0.5 TABLET ORAL at 18:47

## 2022-11-21 RX ADMIN — FLUTICASONE PROPIONATE 1 SPRAY: 50 SPRAY, METERED NASAL at 10:55

## 2022-11-21 RX ADMIN — ENOXAPARIN SODIUM 90 MG: 100 INJECTION SUBCUTANEOUS at 10:35

## 2022-11-21 RX ADMIN — POTASSIUM CHLORIDE 40 MEQ: 1500 TABLET, EXTENDED RELEASE ORAL at 18:47

## 2022-11-21 NOTE — PLAN OF CARE
Problem: OCCUPATIONAL THERAPY ADULT  Goal: Performs self-care activities at highest level of function for planned discharge setting  See evaluation for individualized goals  Description: Treatment Interventions: ADL retraining, Functional transfer training, UE strengthening/ROM, Endurance training, Patient/family training, Cognitive reorientation, Equipment evaluation/education, Compensatory technique education, Continued evaluation, Energy conservation, Activityengagement          See flowsheet documentation for full assessment, interventions and recommendations  Note: Limitation: Decreased ADL status, Decreased endurance, Decreased cognition, Decreased self-care trans, Decreased high-level ADLs  Prognosis: Fair  Assessment: pt seen for OT session this AM  pt found in bed, minimally communicative, but answers yes to therapy  pt needing max assist of one to roll side to side  total assist for hygiene as pt had had a BM, needing to be cleaned up prior to session  once done, pt able to get to EOB w max assist of one  noted to have difficulty leaning to his L side, keeping head/neck laterally flexed to his R side  pt engaged in neck rotation exercises trying to turn his chin towards his L side  difficulty noted, neck stiffness noted  pt also noted to have increased tremors b/l ue's, difficulty self feeding and doing self care  today, pt needing to be fed as well as max assist for self care  initial goals set to  today, will renew x 30 days, change frequency to 2-4x/week  from OT standpoint, pt will need STR       OT Discharge Recommendation: Post acute rehabilitation services

## 2022-11-21 NOTE — PLAN OF CARE
Problem: Potential for Falls  Goal: Patient will remain free of falls  Description: INTERVENTIONS:  - Educate patient/family on patient safety including physical limitations  - Instruct patient to call for assistance with activity   - Consult OT/PT to assist with strengthening/mobility   - Keep Call bell within reach  - Keep bed low and locked with side rails adjusted as appropriate  - Keep care items and personal belongings within reach  - Initiate and maintain comfort rounds  - Make Fall Risk Sign visible to staff  - Apply yellow socks and bracelet for high fall risk patients  - Consider moving patient to room near nurses station  Outcome: Progressing     Problem: PAIN - ADULT  Goal: Verbalizes/displays adequate comfort level or baseline comfort level  Description: Interventions:  - Encourage patient to monitor pain and request assistance  - Assess pain using appropriate pain scale  - Administer analgesics based on type and severity of pain and evaluate response  - Implement non-pharmacological measures as appropriate and evaluate response  - Consider cultural and social influences on pain and pain management  - Notify physician/advanced practitioner if interventions unsuccessful or patient reports new pain  Outcome: Progressing     Problem: SAFETY ADULT  Goal: Maintains/Returns to pre admission functional level  Description: INTERVENTIONS:  - Perform BMAT or MOVE assessment daily    - Set and communicate daily mobility goal to care team and patient/family/caregiver     - Collaborate with rehabilitation services on mobility goals if consulted  - Record patient progress and toleration of activity level   Outcome: Progressing     Problem: Prexisting or High Potential for Compromised Skin Integrity  Goal: Skin integrity is maintained or improved  Description: INTERVENTIONS:  - Identify patients at risk for skin breakdown  - Assess and monitor skin integrity  - Assess and monitor nutrition and hydration status  - Monitor labs   - Assess for incontinence   - Turn and reposition patient  - Assist with mobility/ambulation  - Relieve pressure over bony prominences  - Avoid friction and shearing  - Provide appropriate hygiene as needed including keeping skin clean and dry  - Evaluate need for skin moisturizer/barrier cream  - Collaborate with interdisciplinary team   - Patient/family teaching  - Consider wound care consult   Outcome: Progressing     Problem: Nutrition/Hydration-ADULT  Goal: Nutrient/Hydration intake appropriate for improving, restoring or maintaining nutritional needs  Description: Monitor and assess patient's nutrition/hydration status for malnutrition  Collaborate with interdisciplinary team and initiate plan and interventions as ordered  Monitor patient's weight and dietary intake as ordered or per policy  Utilize nutrition screening tool and intervene as necessary  Determine patient's food preferences and provide high-protein, high-caloric foods as appropriate       INTERVENTIONS:  - Monitor oral intake, urinary output, labs, and treatment plans  - Assess nutrition and hydration status and recommend course of action  - Evaluate amount of meals eaten  - Assist patient with eating if necessary   - Allow adequate time for meals  - Recommend/ encourage appropriate diets, oral nutritional supplements, and vitamin/mineral supplements  - Order, calculate, and assess calorie counts as needed  - Recommend, monitor, and adjust tube feedings and TPN/PPN based on assessed needs  - Assess need for intravenous fluids  - Provide specific nutrition/hydration education as appropriate  - Include patient/family/caregiver in decisions related to nutrition  Outcome: Progressing

## 2022-11-21 NOTE — PHYSICAL THERAPY NOTE
PHYSICAL THERAPY NOTE          Patient Name: Shoshana Silverio  HTEZF'N Date: 11/21/2022 11/21/22 1050   PT Last Visit   PT Visit Date 11/21/22   Note Type   Note Type Treatment  (Seen as co-treatment with OT 2* increased medical complexity and multiple co-morbidiites)   Pain Assessment   Pain Assessment Tool 0-10   Pain Score No Pain   Restrictions/Precautions   Weight Bearing Precautions Per Order No   Other Precautions Cognitive; Chair Alarm; Bed Alarm;Multiple lines; Fall Risk   General   Chart Reviewed Yes   Response to Previous Treatment Patient unable to report, no changes reported from family or staff   Family/Caregiver Present No   Cognition   Overall Cognitive Status Impaired   Arousal/Participation Arousable; Cooperative   Attention Attends with cues to redirect   Orientation Level Oriented to person;Oriented to place;Oriented to time   Memory Unable to assess   Following Commands Follows one step commands with increased time or repetition   Comments pt with flat affect, increased time required to complete all tasks as well as answer yes/no questions  Bed Mobility   Rolling R 2  Maximal assistance   Additional items Assist x 1;Bedrails; Increased time required;Verbal cues   Rolling L 2  Maximal assistance   Additional items Assist x 1; Increased time required;Verbal cues; Bedrails   Supine to Sit 2  Maximal assistance   Additional items Assist x 1;HOB elevated; Bedrails; Increased time required;Verbal cues;LE management   Sit to Supine Unable to assess   Additional Comments pt supine in bed upon arrival  Pt returned to sitting OOB in recliner with alarm on and all needs wihtin reach at the end of therapy session   Transfers   Sit to Stand 2  Maximal assistance   Additional items Assist x 2; Increased time required;Verbal cues   Stand to Sit 2  Maximal assistance   Additional items Assist x 2; Increased time required;Verbal cues Stand pivot 2  Maximal assistance   Additional items Assist x 2; Increased time required;Verbal cues   Additional Comments transfers from EOB to drop arm chair with max Ax2   Ambulation/Elevation   Gait pattern Excessively slow; Short stride; Foward flexed;Decreased foot clearance; Improper Weight shift; Shuffling   Gait Assistance 2  Maximal assist   Additional items Assist x 2;Verbal cues; Tactile cues   Assistive Device Other (Comment)  (b/l HHA)   Distance steps to chair   Balance   Static Sitting Fair -   Dynamic Sitting Fair -   Static Standing Poor   Dynamic Standing Poor -   Ambulatory Poor -   Endurance Deficit   Endurance Deficit Yes   Endurance Deficit Description cognition, generalized fatigue/ weakness/ deconditioning   Activity Tolerance   Activity Tolerance Patient limited by fatigue; Other (Comment)  (cognition)   Nurse Made Aware RN cleared pt to participate in therapy session   Exercises   Knee AROM Long Arc Quad Sitting;Bilateral;10 reps;AAROM   Ankle Pumps Sitting;Bilateral;10 reps;AAROM   Marching Sitting;Bilateral;10 reps;AAROM   Neuro re-ed sitting EOB weightshifting onto L elbow to assist in R trunk/ neck stretching to assist in correcting R lateral lean  Spoke with RN to help position pt on L side when supine to assist in gravity assisted stretching of R sided neck musculature  Assessment   Prognosis Guarded   Problem List Decreased strength;Decreased range of motion;Decreased endurance; Impaired balance;Decreased mobility; Decreased coordination;Decreased cognition;Decreased safety awareness;Pain   Assessment Pt seen for PT treatment session this date  Therapy session focused on bed mobility, functional transfers, therapeutic exercise and endurance training in order to improve overall mobility and independence  Pt requires assist of 1-2 for all mobility performed this date  Pt continues to be limited by cognition, minimally verbal and requiring increased time to complete all tasks   Pt with increased R lateral leaning/ neck + trunk muscular tightness- spoke with RN regarding positioning in bed to help stretch  Pt able to complete bed mobility tasks with max Ax1 this date, min A required to maintain EOB balance  Pt continues to require max Ax2 to complete functional transfers and taking short shuffling steps to bedside chair  AAROM to b/l LEs completed with constant VC/ TC required to complete  Pt making slow progress toward goals  Pt was left sitting OOB in recliner at the end of PT session with all needs in reach  Pt would benefit from continued PT services while in hospital to address remaining limitations  PT to continue to follow pt and recommends post-acute rehab services upon d/c  The patient's AM-PAC Basic Mobility Inpatient Short Form Raw Score is 6  A Raw score of less than or equal to 16 suggests the patient may benefit from discharge to post-acute rehabilitation services  Please also refer to the recommendation of the Physical Therapist for safe discharge planning  Barriers to Discharge Inaccessible home environment   Goals   Patient Goals none stated   STG Expiration Date 11/28/22   PT Treatment Day 6   Plan   Treatment/Interventions ADL retraining;Functional transfer training;LE strengthening/ROM; Endurance training;Patient/family training;Equipment eval/education; Bed mobility;Gait training;Spoke to nursing;Spoke to case management;OT   Progress Slow progress, cognitive deficits   PT Frequency 2-3x/wk   Recommendation   PT Discharge Recommendation Post acute rehabilitation services   AM-PAC Basic Mobility Inpatient   Turning in Bed Without Bedrails 1   Lying on Back to Sitting on Edge of Flat Bed 1   Moving Bed to Chair 1   Standing Up From Chair 1   Walk in Room 1   Climb 3-5 Stairs 1   Basic Mobility Inpatient Raw Score 6   Highest Level Of Mobility   JH-HLM Goal 2: Bed activities/Dependent transfer   JH-HLM Achieved 4: Move to chair/commode   Education   Education Provided Mobility training;Assistive device   Patient Reinforcement needed   End of Consult   Patient Position at End of Consult Bedside chair;Bed/Chair alarm activated; All needs within reach     Martha Meraz, PT, DPT

## 2022-11-21 NOTE — ASSESSMENT & PLAN NOTE
· Previous admission from the end of September until the end of October at [facility] in which he was hospitalized for over a month for severe anxiety and inability to care for self  · Per sister reportedly was able to ambulate prior to the hospitalization but after the hospitalization patient needing assistance with ambulation at home  · During hospitalization there patient developed hyponatremia during his course of stay and Zoloft that had been started was stopped due to hyponatremia  · PRN lorazepam ordered   · Recently discharged from inpatient psychiatry with mirtazapine and aripiprazole, discussed resumption of thesewith the patient but he reports excess sedation with these  · D/w psychiatry earlier this week started buspar 5mg BID, and lexapro  · He does admit to depressive symptoms and anxiety, discussing certain topics with him result is worsening tremor  · Monitor response to antidepressants  · Re consult psych for re-evaluation / possible IP psych admission for [address] [place] [address] on 11/18 recommended inpatient psychiatry admission, in-person evaluation with our on-site psychiatrist 11/21 did not feel this was necessary  Recommended increasing escitalopram, making lorazepam scheduled as patient does not ask for it but his anxiety might benefit from it, and continuing BusPar  Overall plan is to help patient improve his anxiety so that he can get to rehab

## 2022-11-21 NOTE — ASSESSMENT & PLAN NOTE
-On presentation: sodium 151, DAYAN, rhabdo on admission   -Note Nephrology input  -initial thought process was this was a hypovolemic hypernatremia  -CT Head: No acute intracranial abnormality  Encephalomalacia involving the right occipital lobe and bilateral cerebelli  Grossly stable appearance of periventricular hypoattenuation compared to MRI brain 8/29/2022, likely representing chronic microvascular ischemic changes  Noted to recur on labs 11/19 to 149, improved to 146 on 11/20 after asking patient to take good p o    Continue to monitor

## 2022-11-21 NOTE — PROGRESS NOTES
Progress Note - [address] 58 y o  male MRN: [id number]  Unit/Bed#: [facility] 907-01 Encounter: [id number]    I came to see the patient for continuation of care  He was evaluated last week by different psychiatrist via telemedicine  And a decision was made for inpatient admission based on records review  Today he I came to evaluate the patient  Ajit Seymour is states that he feels very anxious, he feels depressed but he denies any suicidal thoughts plans or intent  According to the nurses sometime he does not take his medication but when I review the medical history had been taking the Lexapro every day and BuSpar  According to the nurses he is sleeping well but he has poor appetite              Behavior over the last 24 hours:  improved  Sleep: normal  Appetite: poor  Medication side effects: No  ROS: Feeling tired and all other systems are negative    Mental Status Evaluation:  Appearance:  age appropriate   Behavior:  Cooperative   Speech:  soft   Mood:  anxious   Affect:  constricted   Language: Unable to assess does not answer   Thought Process:  concrete   Associations: concrete associations   Thought Content:  normal   Perceptual Disturbances: None   Risk Potential: Suicidal Ideations none, Homicidal Ideations none and Potential for Aggression No   Sensorium:  person and place   Memory:  patient does not answer   Cognition:  patient does not answer   Consciousness:  awake    Attention: attention span appeared shorter than expected for age   Intellect: not examined   Fund of Knowledge: awareness of current events: Unable to assess he does not answer the question   Insight:  limited   Judgment: limited   Muscle Strength and Tone: Within normal limits   Gait/Station: Unable to assess patient is in a chair   Motor Activity: no abnormal movements         Assessment/Plan  Barrera Heck is a 58 y o  male with hyperthyroidism, severe protein calorie malnutrition, hypernatremia, cerebrovascular accident, tremors of right hand, antiphospholipid antibody with hyper coagulated states, atrial fibrillation and generalized anxiety disorder has been evaluated today for severe anxiety and medication management  Patient answer several question, he denies any suicidal thoughts plans or intent he does not have any psychotic symptom  He states that he feels depressed and anxious and he feels tired  Diagnosis:  Generalized anxiety disorder    Recommended Treatment:   Continue medical management  Increase Lexapro 10 mg p o  Daily  Try lorazepam 0 5 mg p o  B i d   Around the clock  Continue BuSpar as ordered  At this time  the patient does not need inpatient psych admission  Discussed with primary team  I will follow-up as needed    Medications:   current meds:   Current Facility-Administered Medications   Medication Dose Route Frequency   • busPIRone (BUSPAR) tablet 5 mg  5 mg Oral BID   • cyanocobalamin (VITAMIN B-12) tablet 500 mcg  500 mcg Oral Daily   • cyanocobalamin injection 1,000 mcg  1,000 mcg Intramuscular Q30 Days   • enoxaparin (LOVENOX) subcutaneous injection 90 mg  1 mg/kg Subcutaneous Q12H [facility]   • [START ON 11/22/2022] escitalopram (LEXAPRO) tablet 10 mg  10 mg Oral Daily   • fluticasone (FLONASE) 50 mcg/act nasal spray 1 spray  1 spray Each Nare Daily   • folic acid (FOLVITE) tablet 1 mg  1 mg Oral Daily   • LORazepam (ATIVAN) tablet 0 5 mg  0 5 mg Oral BID   • metoprolol succinate (TOPROL-XL) 24 hr tablet 100 mg  100 mg Oral Daily   • multivitamin stress formula tablet 1 tablet  1 tablet Oral Daily   • pantoprazole (PROTONIX) EC tablet 40 mg  40 mg Oral Early Morning   • potassium chloride (K-DUR,KLOR-CON) CR tablet 40 mEq  40 mEq Oral BID   • senna (SENOKOT) tablet 8 6 mg  1 tablet Oral HS PRN   • sodium chloride (OCEAN) 0 65 % nasal spray 1 spray  1 spray Each Nare Q1H PRN   • thiamine tablet 100 mg  100 mg Oral Daily   • warfarin (COUMADIN) tablet 5 mg  5 mg Oral QAM         Risks, benefits and possible side effects of Medications:     Risks, benefits, and possible side effects of medications explained to patient and patient verbalizes understanding  Labs: I have personally reviewed all pertinent laboratory results  I have personally reviewed all pertinent laboratory/tests results    Labs in last 72 hours:   Recent Labs     11/19/22 0605 11/21/22  0520   WBC 5 28  --    RBC 3 40*  --    HGB 10 7*  --    HCT 34 1*  --    *  --    RDW 16 8*  --    NEUTROABS 4 18  --    SODIUM 149* 146   K 3 3* 3 0*   * 116*   CO2 23 23   BUN 19 12   CREATININE 0 70 0 61   GLUC 96 109   CALCIUM 8 5 8 5         Gordo Seaman MD

## 2022-11-21 NOTE — ASSESSMENT & PLAN NOTE
Per chart review patient has had low TSH and elevated free T4 in the past   Most recent labs during this admission with TSH 0 282 f t4 1 56  thyroid U/S during last admission at [address] [place] [address] was unremarkable   Has seen Endo in the past during previous admission at [address] [place] [address] (oct 22) and they recommend get repeat TFT, TSI and thyroid abs   · Endo consulted, appreciate reccs  · Repeat thyroid tests in 4-6 weeks

## 2022-11-21 NOTE — ASSESSMENT & PLAN NOTE
· Outpatient follow-up with Neurology  No inpatient intervention as per Neurology  Will follow-up with Dr Laine Baires as outpatient  Patient known to him for prior stroke

## 2022-11-21 NOTE — PLAN OF CARE
Problem: PHYSICAL THERAPY ADULT  Goal: Performs mobility at highest level of function for planned discharge setting  See evaluation for individualized goals  Description: Treatment/Interventions: LE strengthening/ROM, Functional transfer training, Endurance training, Patient/family training, Bed mobility, Cognitive reorientation, Therapeutic exercise          See flowsheet documentation for full assessment, interventions and recommendations  Note: Prognosis: Guarded  Problem List: Decreased strength, Decreased range of motion, Decreased endurance, Impaired balance, Decreased mobility, Decreased coordination, Decreased cognition, Decreased safety awareness, Pain  Assessment: Pt seen for PT treatment session this date  Therapy session focused on bed mobility, functional transfers, therapeutic exercise and endurance training in order to improve overall mobility and independence  Pt requires assist of 1-2 for all mobility performed this date  Pt continues to be limited by cognition, minimally verbal and requiring increased time to complete all tasks  Pt with increased R lateral leaning/ neck + trunk muscular tightness- spoke with RN regarding positioning in bed to help stretch  Pt able to complete bed mobility tasks with max Ax1 this date, min A required to maintain EOB balance  Pt continues to require max Ax2 to complete functional transfers and taking short shuffling steps to bedside chair  AAROM to b/l LEs completed with constant VC/ TC required to complete  Pt making slow progress toward goals  Pt was left sitting OOB in recliner at the end of PT session with all needs in reach  Pt would benefit from continued PT services while in hospital to address remaining limitations  PT to continue to follow pt and recommends post-acute rehab services upon d/c  The patient's AM-PAC Basic Mobility Inpatient Short Form Raw Score is 6   A Raw score of less than or equal to 16 suggests the patient may benefit from discharge to post-acute rehabilitation services  Please also refer to the recommendation of the Physical Therapist for safe discharge planning  Barriers to Discharge: Inaccessible home environment     PT Discharge Recommendation: Post acute rehabilitation services    See flowsheet documentation for full assessment

## 2022-11-21 NOTE — PROGRESS NOTES
[facility]  Progress Note - Claudia Winters 1960, 58 y o  male MRN: [id number]  [facility] En[facility]: [id number]  Primary Care Provider: Jason Henao MD   Date and time admitted to hospital: 11/3/2022  3:28 PM    Impaired decision making  Assessment & Plan  Per Neuropsych eval 11/14 patient does NOT have capacity to make medical decisions   Patient also has some level of cognitive impairment   B12 borderline low   · B12 IM shot x 1 dose   · Start b12 supplements    Hyperthyroidism  Assessment & Plan  Per chart review patient has had low TSH and elevated free T4 in the past   Most recent labs during this admission with TSH 0 282 f t4 1 56  thyroid U/S during last admission at [address] [place] [address] was unremarkable   Has seen Endo in the past during previous admission at [address] [place] [address] (oct 22) and they recommend get repeat TFT, TSI and thyroid abs   · Endo consulted, appreciate reccs  · Repeat thyroid tests in 4-6 weeks     Severe protein-calorie malnutrition ([address] )  Assessment & Plan  Malnutrition Findings:   Adult Malnutrition type: Chronic illness  Adult Degree of Malnutrition: Other severe protein calorie malnutrition  Malnutrition Characteristics: Inadequate energy, Weight loss                  360 Statement: Severe Pro/cathryn malnutrition r/t inadequate intake/anxiety as evidenced by 28% unplanned weight loss since 5/3/22, consuming < 75% energy intake compared to estimated enrgy needs > 1 month  Treatment TBD s/p swallow eval    BMI Findings: Body mass index is 29 1 kg/m²  Po intake improving     Hypernatremia  Assessment & Plan  -On presentation: sodium 151, DAYAN, rhabdo on admission   -Note Nephrology input  -initial thought process was this was a hypovolemic hypernatremia  -CT Head: No acute intracranial abnormality  Encephalomalacia involving the right occipital lobe and bilateral cerebelli    Grossly stable appearance of periventricular hypoattenuation compared to MRI brain 8/29/2022, likely representing chronic microvascular ischemic changes  Noted to recur on labs 11/19 to 149, improved to 146 on 11/20 after asking patient to take good p o  Continue to monitor      Hypokalemia  Assessment & Plan  · Patient noted to have hypokalemia despite daily standing repletion  · 11/21, increased repletion to 80 mEq daily - trying to avoid PIV replacement as agitates patient  · Recheck labs tomorrow  · If hypokalemia continues, consider Nephrology evaluation    CVA (cerebral vascular accident) ([facility] [address] )  Paolo Killian  -Hx CVA x 3 in setting of antiphospholipid syndrome  -on PTA warfarin although noted supratherapeutic INR  -CT head in ER: No acute intracranial abnormality  Encephalomalacia involving the right occipital lobe and bilateral cerebelli  Grossly stable appearance of periventricular hypoattenuation compared to MRI brain 8/29/2022, likely representing chronic microvascular ischemic changes  PT/ ot consulted and recommending rehab    Tremor of right hand  Assessment & Plan  · Outpatient follow-up with Neurology  No inpatient intervention as per Neurology  Will follow-up with Dr Thompson Freedman as outpatient  Patient known to him for prior stroke      Antiphospholipid antibody with hypercoagulable state ([facility] [address] )  Assessment & Plan  -history of antiphospholipid antibody on PTA Coumadin  -had ischemic CVA despite being on Eliquis in the past  -restarting warfarin as noted above  -inr is subtherapeutic, will continue bridge with lovenox  -fortunately pt has taken warfarin past few days, CTM INR    Atrial fibrillation [facility])  Assessment & Plan  Will target INR between 2 5 in 3 5 for hypercoagulable state  · Continue lovenox to warfarin bridge  · 11/18 - Switched to once daily metroplol XL 50 qd to ease pill burden  · 11/20 - increase to metop succ 100mg daily     SHIMON (generalized anxiety disorder)  Assessment & Plan  · Previous admission from the end of September until the end of October at [facility] in which he was hospitalized for over a month for severe anxiety and inability to care for self  · Per sister reportedly was able to ambulate prior to the hospitalization but after the hospitalization patient needing assistance with ambulation at home  · During hospitalization there patient developed hyponatremia during his course of stay and Zoloft that had been started was stopped due to hyponatremia  · PRN lorazepam ordered   · Recently discharged from inpatient psychiatry with mirtazapine and aripiprazole, discussed resumption of thesewith the patient but he reports excess sedation with these  · D/w psychiatry earlier this week started buspar 5mg BID, and lexapro  · He does admit to depressive symptoms and anxiety, discussing certain topics with him result is worsening tremor  · Monitor response to antidepressants  · Re consult psych for re-evaluation / possible IP psych admission for [facility] psychiatry on 11/18 recommended inpatient psychiatry admission, in-person evaluation with our on-site psychiatrist 11/21 did not feel this was necessary  Recommended increasing escitalopram, making lorazepam scheduled as patient does not ask for it but his anxiety might benefit from it, and continuing BusPar  Overall plan is to help patient improve his anxiety so that he can get to rehab  VTE Pharmacologic Prophylaxis: VTE Score: 3 Moderate Risk (Score 3-4) - Pharmacological DVT Prophylaxis Ordered: enoxaparin (Lovenox)  Patient Centered Rounds: Jackson freeman RN  Discussions with Specialists or Other Care Team Provider:  Psychiatry    Education and Discussions with Family / Patient: Updated  (sister) at bedside  Time Spent for Care: 30 minutes  More than 50% of total time spent on counseling and coordination of care as described above      Current Length of Stay: 18 day(s)  Current Patient Status: Inpatient   Certification Statement: The patient will continue to require additional inpatient hospital stay due to Hypokalemia, severe anxiety  Discharge Plan: Anticipate discharge in >72 hrs to rehab facility  Code Status: Level 3 - DNAR and DNI    Subjective:   Patient answered some questions today  He said my name which he has never done before  Reports he is still very anxious  Objective:     Vitals:   Temp (24hrs), Av 6 °F (36 4 °C), Min:97 5 °F (36 4 °C), Max:97 7 °F (36 5 °C)    Temp:  [97 5 °F (36 4 °C)-97 7 °F (36 5 °C)] 97 7 °F (36 5 °C)  HR:  [] 105  Resp:  [17-20] 17  BP: (134-137)/(85-88) 134/85  SpO2:  [94 %-98 %] 98 %  Body mass index is 29 1 kg/m²  Input and Output Summary (last 24 hours): Intake/Output Summary (Last 24 hours) at 2022 1822  Last data filed at 2022 1300  Gross per 24 hour   Intake 900 ml   Output --   Net 900 ml       Physical Exam:   Physical Exam  Vitals reviewed  Constitutional:       General: He is not in acute distress  Appearance: He is not toxic-appearing  HENT:      Mouth/Throat:      Mouth: Mucous membranes are moist    Eyes:      General: No scleral icterus  Pulmonary:      Effort: Pulmonary effort is normal  No respiratory distress  Musculoskeletal:      Right lower leg: No edema  Left lower leg: No edema  Skin:     General: Skin is warm and dry  Neurological:      General: No focal deficit present  Mental Status: He is alert and oriented to person, place, and time  Psychiatric:      Comments:  Answers questions delayed, not very verbose, shaking and increased work of breathing when discussing certain issues although improved from yesterday            Additional Data:     Labs:  Results from last 7 days   Lab Units 22  0605   WBC Thousand/uL 5 28   HEMOGLOBIN g/dL 10 7*   HEMATOCRIT % 34 1*   PLATELETS Thousands/uL 120*   NEUTROS PCT % 80*   LYMPHS PCT % 13*   MONOS PCT % 6   EOS PCT % 1     Results from last 7 days   Lab Units 22  0520   SODIUM mmol/L 146 POTASSIUM mmol/L 3 0*   CHLORIDE mmol/L 116*   CO2 mmol/L 23   BUN mg/dL 12   CREATININE mg/dL 0 61   ANION GAP mmol/L 7   CALCIUM mg/dL 8 5   GLUCOSE RANDOM mg/dL 109     Results from last 7 days   Lab Units 11/21/22  0520   INR  1 23*     Results from last 7 days   Lab Units 11/15/22  0801   POC GLUCOSE mg/dl 98               Lines/Drains:  Invasive Devices     Peripheral Intravenous Line  Duration           Peripheral IV 11/19/22 Dorsal (posterior); Left Hand 2 days          Drain  Duration           External Urinary Catheter 6 days                      Imaging: No pertinent imaging reviewed      Recent Cultures (last 7 days):         Last 24 Hours Medication List:   Current Facility-Administered Medications   Medication Dose Route Frequency Provider Last Rate   • busPIRone  5 mg Oral BID Evelyne Palacios MD     • vitamin B-12  500 mcg Oral Daily Jasmit Kaylie, DO     • cyanocobalamin  1,000 mcg Intramuscular Q30 Days Jasmit Kaylie, DO     • enoxaparin  1 mg/kg Subcutaneous Q12H Lizy Zurita MD     • [START ON 11/22/2022] escitalopram  10 mg Oral Daily Whitney Salter MD     • fluticasone  1 spray Each Nare Daily Jasmit Kaylie, DO     • folic acid  1 mg Oral Daily Evelyne Palacios MD     • LORazepam  0 5 mg Oral BID Ryan Allen MD     • metoprolol succinate  100 mg Oral Daily Ryan Allen MD     • multivitamin stress formula  1 tablet Oral Daily Evelyne Palacios MD     • pantoprazole  40 mg Oral Early Morning Jasmit Kaylie, DO     • potassium chloride  40 mEq Oral BID Ryan Allen MD     • senna  1 tablet Oral HS PRN Ryan Allen MD     • sodium chloride  1 spray Each Nare Q1H PRN Ryan Allen MD     • thiamine  100 mg Oral Daily Evelyne Palacios MD     • warfarin  5 mg Oral QAM Ryan Allen MD          Today, Patient Was Seen By: Ryan Allen MD    **Please Note: This note may have been constructed using a voice recognition system  **

## 2022-11-21 NOTE — ASSESSMENT & PLAN NOTE
· Patient noted to have hypokalemia despite daily standing repletion  · 11/21, increased repletion to 80 mEq daily - trying to avoid PIV replacement as agitates patient  · Recheck labs tomorrow  · If hypokalemia continues, consider Nephrology evaluation

## 2022-11-22 LAB
ANION GAP SERPL CALCULATED.3IONS-SCNC: 5 MMOL/L (ref 4–13)
BUN SERPL-MCNC: 16 MG/DL (ref 5–25)
CALCIUM SERPL-MCNC: 8.7 MG/DL (ref 8.3–10.1)
CHLORIDE SERPL-SCNC: 119 MMOL/L (ref 96–108)
CO2 SERPL-SCNC: 24 MMOL/L (ref 21–32)
CREAT SERPL-MCNC: 0.7 MG/DL (ref 0.6–1.3)
GFR SERPL CREATININE-BSD FRML MDRD: 101 ML/MIN/1.73SQ M
GLUCOSE SERPL-MCNC: 108 MG/DL (ref 65–140)
INR PPP: 1.49 (ref 0.84–1.19)
POTASSIUM SERPL-SCNC: 3.2 MMOL/L (ref 3.5–5.3)
PROTHROMBIN TIME: 18.2 SECONDS (ref 11.6–14.5)
SODIUM SERPL-SCNC: 148 MMOL/L (ref 135–147)

## 2022-11-22 RX ORDER — METOPROLOL TARTRATE 5 MG/5ML
5 INJECTION INTRAVENOUS EVERY 6 HOURS PRN
Status: DISCONTINUED | OUTPATIENT
Start: 2022-11-22 | End: 2022-12-09 | Stop reason: HOSPADM

## 2022-11-22 RX ADMIN — WARFARIN SODIUM 5 MG: 5 TABLET ORAL at 09:11

## 2022-11-22 RX ADMIN — LORAZEPAM 0.5 MG: 0.5 TABLET ORAL at 09:11

## 2022-11-22 RX ADMIN — METOPROLOL SUCCINATE 100 MG: 100 TABLET, EXTENDED RELEASE ORAL at 09:10

## 2022-11-22 RX ADMIN — ESCITALOPRAM OXALATE 10 MG: 10 TABLET ORAL at 09:11

## 2022-11-22 RX ADMIN — THIAMINE HCL TAB 100 MG 100 MG: 100 TAB at 09:10

## 2022-11-22 RX ADMIN — POTASSIUM CHLORIDE 40 MEQ: 1500 TABLET, EXTENDED RELEASE ORAL at 18:20

## 2022-11-22 RX ADMIN — ENOXAPARIN SODIUM 90 MG: 100 INJECTION SUBCUTANEOUS at 22:15

## 2022-11-22 RX ADMIN — CYANOCOBALAMIN TAB 500 MCG 500 MCG: 500 TAB at 09:11

## 2022-11-22 RX ADMIN — FOLIC ACID 1 MG: 1 TABLET ORAL at 09:10

## 2022-11-22 RX ADMIN — POTASSIUM CHLORIDE 40 MEQ: 1500 TABLET, EXTENDED RELEASE ORAL at 09:10

## 2022-11-22 RX ADMIN — BUSPIRONE HYDROCHLORIDE 5 MG: 5 TABLET ORAL at 09:10

## 2022-11-22 RX ADMIN — BUSPIRONE HYDROCHLORIDE 5 MG: 5 TABLET ORAL at 18:20

## 2022-11-22 RX ADMIN — ENOXAPARIN SODIUM 90 MG: 100 INJECTION SUBCUTANEOUS at 09:10

## 2022-11-22 RX ADMIN — B-COMPLEX W/ C & FOLIC ACID TAB 1 TABLET: TAB at 09:10

## 2022-11-22 RX ADMIN — FLUTICASONE PROPIONATE 1 SPRAY: 50 SPRAY, METERED NASAL at 09:11

## 2022-11-22 RX ADMIN — PANTOPRAZOLE SODIUM 40 MG: 40 TABLET, DELAYED RELEASE ORAL at 05:53

## 2022-11-22 NOTE — PROGRESS NOTES
[facility]  Progress Note - Sumeet Sagastume 1960, 58 y o  male MRN: [id number]  [facility]: [id number]  Primary Care Provider: Vincent Pino MD   Date and time admitted to hospital: 11/3/2022  3:28 PM      * SHIMON (generalized anxiety disorder)  Assessment & Plan  • Previous admission from the end of September until the end of October at [facility] in which he was hospitalized for over a month for severe anxiety and inability to care for self  • Per sister reportedly was able to ambulate prior to the hospitalization but after the hospitalization patient needing assistance with ambulation at home  • During hospitalization there patient developed hyponatremia during his course of stay and Zoloft that had been started was stopped due to hyponatremia  • PRN lorazepam ordered   • Recently discharged from inpatient psychiatry with mirtazapine and aripiprazole, discussed resumption of thesewith the patient but he reports excess sedation with these  • D/w psychiatry earlier this week started buspar 5mg BID, and lexapro  • He does admit to depressive symptoms and anxiety, discussing certain topics with him result is worsening tremor  • Monitor response to antidepressants  • Reconsulted psychiatry for re-evaluation / possible IP psych admission for [address] [place] [address] on 11/18 recommended inpatient psychiatry admission, in-person evaluation with our on-site psychiatrist 11/21 did not feel this was necessary  Recommended increasing escitalopram, making lorazepam scheduled as patient does not ask for it but his anxiety might benefit from it, and continuing BusPar  Overall plan is to help patient improve his anxiety so that he can get to rehab    • Per updated discussion w/ psychiatry -> continue current Lexapro/Buspar/Ativan regimen -> okay for discharge to skilled rehab from a psychiatric point of view -> awaiting placement, however, patient likely undergoing "OPTIONS" process prior to skilled rehab placement due to underlying psychiatric history             Cognitive impairment  Assessment & Plan  · Per neuropsychology evaluation on 11/14, patient does NOT have capacity to make medical decisions   · Patient also has some level of cognitive impairment   · B12 borderline low -> s/p one time intramuscular B12 shot -> continue supplementation     Hyperthyroidism  Assessment & Plan  · Per chart review patient has had low TSH and elevated free T4 in the past   · Most recent labs during this admission with TSH 0 282 and free t4 1 56  · Thyroid U/S during last admission at [facility] was unremarkable   · Evaluated by endocrinology in the past during previous admission at [facility] (oct 22) and they recommend get repeat TFT, TSI and thyroid abs   · Appreciate endocrinology evaluation this hospital course -> likely subclinical hyperthyroidism -> repeat thyroid testing in 4-6 weeks once acute medical issues stabilized    Severe protein-calorie malnutrition   Assessment & Plan  · BMI of 29 10 in the setting of decreased appetite/oral intake and unintentional weight loss  · Continue nutritional supplementation w/ meals     Hypernatremia  Assessment & Plan  · On presentation: sodium 151 -> progressively improved currently at 148  · Appreciate nephrology input  · Encourage increased oral intake  · Continue serum sodium monitoring    Hypokalemia  Assessment & Plan  • Monitor/replete serum potassium and magnesium as necessary     History of CVA (cerebral vascular accident)   Assessment & Plan  · H/o CVA x 3 in setting of antiphospholipid syndrome  · On Coumadin for anticoagulation  · CT head in ER: No acute intracranial abnormality   Encephalomalacia involving the right occipital lobe and bilateral cerebelli   Grossly stable appearance of periventricular hypoattenuation compared to MRI brain 8/29/2022, likely representing chronic microvascular ischemic changes    · Appreciate PT/OT input -> awaiting skilled rehab placement     Tremor of right hand  Assessment & Plan  • Outpatient follow-up w/ neurology - consider sequela of prior strokes ? ?     Antiphospholipid antibody with hypercoagulable state  Assessment & Plan  · Continue Coumadin regimen - had ischemic stroke in the past despite Eliquis, hence, deemed Eliquis failure  · Due to subtherapeutic INR, Coumadin being bridged w/ therapeutic Lovenox currently     Atrial fibrillation   Assessment & Plan  · Rate controlled on Toprol-XL  · Continue Coumadin for anticoagulation (w/ therapeutic Lovenox bridge until INR at goal)        DVT Prophylaxis:  therapeutic Lovenox - Coumadin bridge    Patient Centered Rounds:  I have performed bedside rounds and discussed plan of care with nursing today  Discussions with Specialists or Other Care Team Provider:  see above assessments if applicable    Education and Discussions with Family / Patient:  Patient, along with sister, at bedside today    Time Spent for Care:  32 minutes  More than 50% of total time spent on counseling and coordination of care as described above  Current Length of Stay: 19 day(s)  Current Patient Status: Inpatient   Certification Statement:  Patient will continue to require additional hospital stay due to assessments as noted above  Code Status: Level 3 - DNAR and DNI        Subjective:     Remains weak/fatigued  Per sister, he still is intermittently anxious with a decreased mood, however, slightly improved from prior  Objective:     Vitals:   Temp (24hrs), Av 8 °F (36 6 °C), Min:97 7 °F (36 5 °C), Max:97 9 °F (36 6 °C)    Temp:  [97 7 °F (36 5 °C)-97 9 °F (36 6 °C)] 97 7 °F (36 5 °C)  HR:  [] 98  Resp:  [18] 18  BP: (146-150)/(95-99) 146/95  SpO2:  [94 %-95 %] 95 %  Body mass index is 29 1 kg/m²  Input and Output Summary (last 24 hours):        Intake/Output Summary (Last 24 hours) at 2022 1801  Last data filed at 2022 1234  Gross per 24 hour   Intake 788 ml   Output --   Net 788 ml       Physical Exam:     GENERAL:  Weak/fatigued  HEAD:  Normocephalic - atraumatic  EYES: PERRL - EOMI   MOUTH:  Mucosa moist  NECK:  Supple - full range of motion  CARDIAC:  Rate controlled - S1/S2 positive  PULMONARY:  Fairly clear to auscultation - nonlabored respirations at rest  ABDOMEN:  Soft - nontender/nondistended - active bowel sounds  MUSCULOSKELETAL:  Motor strength/range of motion deconditioned  NEUROLOGIC:  Alert/oriented at baseline - refuses to speak  SKIN:  Chronic wrinkles/blemishes   PSYCHIATRIC:  Mood/affect flat      Additional Data:     Labs & Recent Cultures:    Results from last 7 days   Lab Units 11/19/22  0605   WBC Thousand/uL 5 28   HEMOGLOBIN g/dL 10 7*   HEMATOCRIT % 34 1*   PLATELETS Thousands/uL 120*   NEUTROS PCT % 80*   LYMPHS PCT % 13*   MONOS PCT % 6   EOS PCT % 1     Results from last 7 days   Lab Units 11/22/22  0555   POTASSIUM mmol/L 3 2*   CHLORIDE mmol/L 119*   CO2 mmol/L 24   BUN mg/dL 16   CREATININE mg/dL 0 70   CALCIUM mg/dL 8 7     Results from last 7 days   Lab Units 11/22/22  0555   INR  1 49*                             Lines/Drains:  Invasive Devices     Peripheral Intravenous Line  Duration           Peripheral IV 11/19/22 Dorsal (posterior); Left Hand 3 days          Drain  Duration           External Urinary Catheter 7 days                  Last 24 Hours Medication List:   Current Facility-Administered Medications   Medication Dose Route Frequency Provider Last Rate   • busPIRone  5 mg Oral BID Eduard Carson MD     • vitamin B-12  500 mcg Oral Daily Tana Lott, DO     • cyanocobalamin  1,000 mcg Intramuscular Q30 Days Tana Lott, DO     • enoxaparin  1 mg/kg Subcutaneous Q12H [address] Eduard Carson MD     • escitalopram  10 mg Oral Daily Alla Salter MD     • fluticasone  1 spray Each Nare Daily Tana Lott, DO     • folic acid  1 mg Oral Daily Eduard Carson MD     • LORazepam  0 5 mg Oral BID Jesu De Leon MD Radha     • metoprolol  5 mg Intravenous Q6H PRN Renetta Kitchen MD     • metoprolol succinate  100 mg Oral Daily Theodore Naylor MD     • multivitamin stress formula  1 tablet Oral Daily Tessie Blair MD     • pantoprazole  40 mg Oral Early Morning Tana Lott DO     • potassium chloride  40 mEq Oral BID Theodore Naylor MD     • senna  1 tablet Oral HS PRN Theodore Naylor MD     • sodium chloride  1 spray Each Nare Q1H PRN Theodore Naylor MD     • thiamine  100 mg Oral Daily Tessie Blair MD     • warfarin  5 mg Oral QAM Theodore Naylor MD                        ** Please Note: This note is constructed using a voice recognition dictation system  An occasional wrong word/phrase or “sound-a-like” substitution may have been picked up by dictation device due to the inherent limitations of voice recognition software  Read the chart carefully and recognize, using reasonable context, where substitutions may have occurred  **

## 2022-11-22 NOTE — WOUND OSTOMY CARE
Progress Note - Wound   Anali Urbina 58 y o  male MRN: [id number]  Unit/Bed#: [facility] 907-01 Encounter: [id number]        Assessment:   Patient seen today for wound care follow up visit  Patient on P-500 specialty mattress  Patient is max assist for turning side to side on admission  Patient is dependent for all ADLs, including being fed  Patient highly incontinent of stool as appreciated during this assessment, rendered maria fernanda-care and full bed change due to large volume of liquid stool, patient may benefit from fecal management system  1  HA evolving DTI left buttocks- some full thickness skin loss with beefy red and purple fragile wound bed, scant sanguineous drainage  Continuing with Triad paste due to significant fecal incontinence  No induration, fluctuance, odor, warmth/temperature differences, redness, or purulence noted to the above noted wounds and skin areas assessed  New dressings applied per orders listed below  Patient tolerated well- no s/s of non-verbal pain or discomfort observed during the encounter  Bedside nurse aware of plan of care  See flow sheets for more detailed assessment findings  Wound care will continue to follow  Skin Care Plan:   1-Apply TRIAD Paste to Sacro-Buttocks Wound TID and PRN episodes of incontinence   2-Turn/reposition q2h or when medically stable for pressure re-distribution on skin   3-Elevate heels to offload pressure  4-Moisturize skin daily with skin nourishing cream   5-Ehob cushion in chair when out of bed  6-Preventative hydraguard to bilateral heels BID and PRN  7-Right Elbow: Cleanse area and pat dry  Cover with small clover allevyn foam dressing  Jose M with T for Treatment  Change every other day or PRN    8-P-500 Specialty mattress ordered for patient     Wound 11/06/22  Buttocks Left (Active)   Wound Image   11/22/22 0958   Wound Description Non-blanchable erythema;Slough; Necrotic;Beefy red 11/22/22 0958   Pressure Injury Stage DTPI 11/22/22 1080   Reena-wound Assessment Fragile 11/22/22 0958   Wound Length (cm) 8 cm 11/22/22 0958   Wound Width (cm) 6 cm 11/22/22 0958   Wound Depth (cm) 0 2 cm 11/22/22 0958   Wound Surface Area (cm^2) 48 cm^2 11/22/22 0958   Wound Volume (cm^3) 9 6 cm^3 11/22/22 0958   Calculated Wound Volume (cm^3) 9 6 cm^3 11/22/22 0958   Change in Wound Size % -9500 11/22/22 0958   Tunneling 0 cm 11/22/22 0958   Tunneling in depth located at 0 11/22/22 0958   Undermining 0 11/22/22 0958   Undermining is depth extending from 0 11/22/22 0958   Wound Site Closure REBECCA 11/22/22 0958   Drainage Amount Scant 11/22/22 0958   Drainage Description Sanguineous 11/22/22 0958   Non-staged Wound Description Full thickness 11/22/22 0958   Treatments Cleansed 11/22/22 0958   Dressing Moisture barrier 11/22/22 0958   Wound packed? No 11/22/22 0958   Packing- # removed 0 11/22/22 0958   Packing- # inserted 0 11/22/22 0958   Dressing Changed New 11/22/22 0958   Patient Tolerance Tolerated well 11/22/22 0958   Dressing Status Clean;Dry; Intact 11/22/22 0958       Wound 11/18/22 Skin tear Arm Posterior;Proximal;Right; Inferior (Active)   Wound Image   11/22/22 0956   Wound Description Yellow;Pink 11/21/22 1035   Reena-wound Assessment Pink 11/21/22 1035   Wound Length (cm) 1 5 cm 11/22/22 0956   Wound Width (cm) 1 5 cm 11/22/22 0956   Wound Depth (cm) 0 1 cm 11/22/22 0956   Wound Surface Area (cm^2) 2 25 cm^2 11/22/22 0956   Wound Volume (cm^3) 0 225 cm^3 11/22/22 0956   Calculated Wound Volume (cm^3) 0 23 cm^3 11/22/22 0956   Change in Wound Size % 48 89 11/22/22 0956   Drainage Amount Scant 11/21/22 1035   Drainage Description Serosanguineous 11/21/22 1035   Non-staged Wound Description Partial thickness 11/21/22 1035   Treatments Cleansed;Site care 11/18/22 0844   Dressing Foam, Silicon (eg  Allevyn, etc) 11/21/22 1035   Wound packed?  No 11/18/22 0844   Dressing Changed New 11/18/22 0844   Patient Tolerance Tolerated well 11/18/22 0844   Dressing Status Clean;Dry; Intact 11/21/22 8612         Juanita Sanchez BSN, RN, [facility]

## 2022-11-22 NOTE — UTILIZATION REVIEW
Continued Stay Review    Date: 11/22/2022                       Current Patient Class: inpatient  Current Level of Care: med/surg  HPI:62 y o  male with underlying APLS syndrome, recurrent CVA initially admitted on 11/3 with worsening generalized weakness and rhabdomyolysis, sepsis, supratherapeutic INR   Assessment/Plan: Per CM note -- [facility] reserved for placement  They can't accept him until they receive the Determination paperwork  It was re-submitted to them last week  Continue supportive care  Potassium continues to be low despite daily standing repletion  Yesterday increased repletion to 80 mEq daily  Trying to avoid IV replacement as agitates pt  BMP in AM   Psych following  Lexapro increased to 10 mg daily, changed lorazepam to 0 5 mg BID scheduled yesterday  Continue previous po meds       Vital Signs:   Date/Time Temp Pulse Resp BP MAP (mmHg) SpO2 O2 Device   11/22/22 07:55:59 97 9 °F (36 6 °C) 101 18 150/99 116 94 % --   11/21/22 15:18:31 97 7 °F (36 5 °C) 105 -- 134/85 101 98 % --   11/21/22 1035 -- -- -- -- -- -- None (Room air)   11/21/22 07:56:23 97 5 °F (36 4 °C) 99 17 136/88 104 97 % --   11/20/22 22:22:36 97 7 °F (36 5 °C) 107 Abnormal  20 137/88 104 96 % --   11/20/22 2204 -- -- -- -- -- 94 % None (Room air)   11/20/22 15:10:36 97 7 °F (36 5 °C) 109 Abnormal  18 129/90 125 96 % --   11/20/22 08:01:57 96 6 °F (35 9 °C) Abnormal  98 18 133/83 127 96 % --       Pertinent Labs/Diagnostic Results:     Results from last 7 days   Lab Units 11/19/22  0605   WBC Thousand/uL 5 28   HEMOGLOBIN g/dL 10 7*   HEMATOCRIT % 34 1*   PLATELETS Thousands/uL 120*   NEUTROS ABS Thousands/µL 4 18     Results from last 7 days   Lab Units 11/22/22  0555 11/21/22  0520 11/19/22  0605 11/16/22  0514   SODIUM mmol/L 148* 146 149* 147   POTASSIUM mmol/L 3 2* 3 0* 3 3* 3 6   CHLORIDE mmol/L 119* 116* 119* 117*   CO2 mmol/L 24 23 23 23   ANION GAP mmol/L 5 7 7 7   BUN mg/dL 16 12 19 14   CREATININE mg/dL 0 70 0 61 0 70 0 74   EGFR ml/min/1 73sq m 101 107 101 98   CALCIUM mg/dL 8 7 8 5 8 5 8 4     Results from last 7 days   Lab Units 11/22/22  0555 11/21/22  0520 11/19/22  0605 11/16/22  0514   GLUCOSE RANDOM mg/dL 108 109 96 99     Results from last 7 days   Lab Units 11/22/22  0555 11/21/22  0520 11/20/22  0527   PROTIME seconds 18 2* 15 7* 14 9*   INR  1 49* 1 23* 1 15       Medications:   Scheduled Medications:  busPIRone, 5 mg, Oral, BID  vitamin B-12, 500 mcg, Oral, Daily  cyanocobalamin, 1,000 mcg, Intramuscular, Q30 Days  enoxaparin, 1 mg/kg, Subcutaneous, Q12H DARLENE  escitalopram, 10 mg, Oral, Daily  fluticasone, 1 spray, Each Nare, Daily  folic acid, 1 mg, Oral, Daily  LORazepam, 0 5 mg, Oral, BID  metoprolol succinate, 100 mg, Oral, Daily  multivitamin stress formula, 1 tablet, Oral, Daily  pantoprazole, 40 mg, Oral, Early Morning  potassium chloride, 40 mEq, Oral, BID  thiamine, 100 mg, Oral, Daily  warfarin, 5 mg, Oral, QAM    PRN Meds:  metoprolol, 5 mg, Intravenous, Q6H PRN  senna, 1 tablet, Oral, HS PRN  sodium chloride, 1 spray, Each Nare, Q1H PRN      Discharge Plan: D    Network Utilization Review Department  ATTENTION: Please call with any questions or concerns to [phone] and carefully listen to the prompts so that you are directed to the right person  All voicemails are confidential   [facility] all requests for admission clinical reviews, approved or denied determinations and any other requests to dedicated fax number below belonging to the campus where the patient is receiving treatment   List of dedicated fax numbers for the Facilities:  [address] [place] [address] DENIALS (Administrative/Medical Necessity) [phone]   [address] [place] [address] (Maternity/NICU/Pediatrics) [phone]   [address] [facility] Caitlyn  [phone]   [address] [facility] [phone]     [address] [phone]   [address] [place] [address] [facility] [address] [facility] 134 815 [facility] [phone]

## 2022-11-22 NOTE — PHYSICAL THERAPY NOTE
Physical Therapy Evaluation     Patient's Name: Pelon Briscoe    Admitting Diagnosis  Rhabdomyolysis [M62 82]  Hypokalemia [E87 6]  Encephalomalacia [G93 89]  Anxiety [F41 9]  DAYAN (acute kidney injury) ([facility] [address] ) [N17 9]  Decreased oral intake [R63 8]  Generalized weakness [R53 1]  Ambulatory dysfunction [R26 2]  AMS (altered mental status) [R41 82]    Problem List  Patient Active Problem List   Diagnosis    Numbness    H/O aortic aneurysm repair    Hypertension    GERD (gastroesophageal reflux disease)    SHIMON (generalized anxiety disorder)    Atrial fibrillation ([facility] [facility] )    Bright red blood per rectum    Constipation    Irritable bowel syndrome    Hyperlipidemia    Kidney stones    Peyronie disease    Vitamin D deficiency    Class 2 obesity in adult    Other viral warts    Physical deconditioning    History of stroke    Antiphospholipid antibody with hypercoagulable state ([facility] [facility] )    Hyperbilirubinemia    CORIE (obstructive sleep apnea)    Anticoagulated on Coumadin    Tremor of right hand    Weakness of both lower extremities    Gastric polyps    Gastric AVM    Edema of both lower legs    Non-recurrent bilateral inguinal hernia without obstruction or gangrene    S/P laparoscopic hernia repair    Low back pain    Alkaline phosphatase elevation    Right inguinal pain    Ataxia    CVA (cerebral vascular accident) ([facility] [address] )    PVD (peripheral vascular disease) ([facility] [address] )    Renal cyst    Atypical chest pain    Hypokalemia    Dizziness    Medical clearance for incarceration    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 attack    Mild protein-calorie malnutrition ([facility] [address] )    Sinus arrhythmia    Hyponatremia    SIADH (syndrome of inappropriate ADH production) ([facility] [address] )    Subclinical hyperthyroidism    Hypernatremia    Severe protein-calorie malnutrition ([facility] [address] )    Hyperthyroidism    Impaired decision making       Past Medical History  Past Medical History:   Diagnosis Date    Aneurysm of thoracic aorta     last assessed 11/20/17 Anxiety     currently on no meds    Arthritis     Bilateral inguinal hernia     Cardiac disease     Cognitive impairment     CVA (cerebral vascular accident) ([facility] )     Depression     GERD (gastroesophageal reflux disease)     Hearing loss of aging     Heart disease     Hyperlipidemia     Hypertension     Irritable bowel syndrome     Kidney stone     Obesity     Occasional tremors     left arm since stroke    Palpitations     Panic attack     PONV (postoperative nausea and vomiting)     and headache x 3 days    Psychiatric illness     Sciatica     right and left  side    Shortness of breath     on exertion    Sleep apnea     is not using a CPAP    Sleep difficulties     Spitting up blood 05/21/2021    Resolved    Status post placement of implantable loop recorder     Stroke ([facility] ) 11/2014    Stroke ([facility] ) 03/2021    TIA (transient ischemic attack)        Past Surgical History  Past Surgical History:   Procedure Laterality Date    AORTA SURGERY      thoracic - aneurysmorrhaphy    APPENDECTOMY      ASCENDING AORTIC ANEURYSM REPAIR      resolved 8/19/15    CARDIAC LOOP RECORDER      CHOLECYSTECTOMY      laparoscopic    COLONOSCOPY      CYSTOSCOPY      with removal of object- stent removal    KNEE ARTHROSCOPY Right 1996    with medial meniscus repair    LITHOTRIPSY      renal    ORTHOPEDIC SURGERY      AR FRAGMENT KIDNEY STONE/ ESWL Left 5/17/2018    Procedure: LITHROTRIPSY EXTRACORPORAL SHOCKWAVE (ESWL); Surgeon: Catherine Garcia MD;  [facility];  Service: Urology    AR Ameena Raym[address] 19 Bilateral 12/9/2021    Procedure: ROBOTIC REPAIR HERNIA INGUINAL;  Surgeon: Danielle Mack MD;  Location: [facility];  Service: General    THUMB ARTHROSCOPY Right 1976    ligament repair    UPPER GASTROINTESTINAL ENDOSCOPY          11/22/22 1050   PT Last Visit   PT Visit Date 11/22/22   Note Type   Note Type Treatment   End of Consult   Patient Position at End of Consult Bed/Chair alarm activated; Bedside chair; All needs within reach   Pain Assessment   Pain Assessment Tool [facility] Pain Intervention([facility]) Ambulation/increased activity; Emotional support   Pain Rating: FLACC (Rest) - Face 0   Pain Rating: FLACC (Rest) - Legs 0   Pain Rating: FLACC (Rest) - Activity 0   Pain Rating: FLACC (Rest) - Cry 0   Pain Rating: FLACC (Rest) - Consolability 0   Score: FLACC (Rest) 0   Pain Rating: FLACC (Activity) - Face 0   Pain Rating: FLACC (Activity) - Legs 0   Pain Rating: FLACC (Activity) - Activity 0   Pain Rating: FLACC (Activity) - Cry 0   Pain Rating: FLACC (Activity) - Consolability 0   Score: FLACC (Activity) 0   Restrictions/Precautions   Weight Bearing Precautions Per Order No   Other Precautions Pain; Fall Risk;Telemetry;Multiple lines; Bed Alarm; Chair Alarm   General   Chart Reviewed Yes   Family/Caregiver Present No   Cognition   Orientation Level Oriented to person   Subjective   Subjective Pt agreeable to PT session with minimal head nod   Bed Mobility   Rolling R 2  Maximal assistance   Additional items Assist x 1   Rolling L 2  Maximal assistance   Additional items Assist x 1   Supine to Sit 2  Maximal assistance   Additional items Assist x 1; Increased time required   Transfers   Sit to Stand 2  Maximal assistance   Additional items Assist x 2; Increased time required   Stand to Sit 2  Maximal assistance   Additional items Assist x 2; Increased time required   Stand pivot 2  Maximal assistance   Additional items Assist x 2; Increased time required   Ambulation/Elevation   Gait pattern Excessively slow; Shuffling;Decreased foot clearance; Improper Weight shift   Gait Assistance 2  Maximal assist   Additional items Assist x 2;Verbal cues   Assistive Device Other (Comment)  (HHA)   Distance 2'   Balance   Static Sitting Fair -   Dynamic Sitting Fair -   Static Standing Poor   Dynamic Standing Poor -   Ambulatory Poor -   Endurance Deficit   Endurance Deficit Yes   Activity Tolerance   Activity Tolerance Patient limited by fatigue;Patient limited by pain   Nurse Made Aware yes, nsg gave clearance to work with pt, updated on pt progress   Assessment   Prognosis Guarded   Problem List Decreased strength;Decreased range of motion;Decreased endurance; Impaired balance;Decreased mobility; Decreased coordination;Decreased cognition;Decreased safety awareness;Pain;Orthopedic restrictions   Assessment Pt required A for LE management during bed mobility and required A to upright trunk  Tolerated sittign EOB with very close A  Pt demonstrated ability to attempt to support with B/L UE during transfers  Initial standing demonstrated poor ability to achieve upright  Second trial demonstrated improved ability to complete upright tolerance  Demonstrated ability to complete steps to chair with minimal foot clearance  Pt will benefit from continued inpt skilled PT to maximize functional mobility & safety  Barriers to Discharge Inaccessible home environment;Decreased caregiver support   Goals   Patient Goals None stated   STG Expiration Date 11/28/22   PT Treatment Day 7   Plan   Treatment/Interventions ADL retraining;LE strengthening/ROM; Bed mobility;Gait training;OT;Spoke to case management;Spoke to nursing   Progress Slow progress, cognitive deficits   PT Frequency 2-3x/wk   Recommendation   PT Discharge Recommendation Post acute rehabilitation services   Equipment Recommended [address] Mobility Inpatient   Turning in Bed Without Bedrails 2   Lying on Back to Sitting on Edge of Flat Bed 2   Moving Bed to Chair 1   Standing Up From Chair 1   Walk in Room 1   Climb 3-5 Stairs 1   Basic Mobility Inpatient Raw Score 8   Turning Head Towards Sound 3   Follow Simple Instructions 3   Low Function Basic Mobility Raw Score 14   Low Function Basic Mobility Standardized Score 22 01   Highest Level Of Mobility   JH-HLM Goal 3: Sit at edge of bed   JH-HLM Achieved 4: Move to chair/commode           Mary Lynn, PT

## 2022-11-22 NOTE — CASE MANAGEMENT
Case Management Discharge Planning Note    Patient name Ana Glatter  Location [address] MRN [id number]  : 1960 Date 2022       Current Admission Date: 11/3/2022  Current Admission Diagnosis:Atrial fibrillation [facility])   Patient Active Problem List    Diagnosis Date Noted   • Impaired decision making 2022   • Hyperthyroidism 11/10/2022   • Severe protein-calorie malnutrition ([facility] ) 2022   • Hypernatremia 2022   • Hyponatremia 10/14/2022   • SIADH (syndrome of inappropriate ADH production) ([facility] ) 10/14/2022   • Subclinical hyperthyroidism 10/14/2022   • Sinus arrhythmia 10/10/2022   • Mild protein-calorie malnutrition ([facility] ) 10/07/2022   • Medical clearance for incarceration 2022   • 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 attack 2022   • Atypical chest pain 2022   • Hypokalemia 2022   • Dizziness 2022   • Renal cyst 2022   • PVD (peripheral vascular disease) ([facility] ) 2022   • Ataxia 2022   • CVA (cerebral vascular accident) ([place] [address] ) 2022   • Right inguinal pain 2022   • Alkaline phosphatase elevation 2022   • Low back pain 2022   • S/P laparoscopic hernia repair 2021   • Non-recurrent bilateral inguinal hernia without obstruction or gangrene 2021   • Gastric polyps 2021   • Gastric AVM 2021   • Edema of both lower legs 2021   • Tremor of right hand 2021   • Weakness of both lower extremities 2021   • Anticoagulated on Coumadin 2021   • CORIE (obstructive sleep apnea)    • Hyperbilirubinemia 08/10/2020   • Antiphospholipid antibody with hypercoagulable state ([facility] ) 2020   • History of stroke 2020   • Other viral warts 2019   • Physical deconditioning 2019   • Class 2 obesity in adult 2018   • Bright red blood per rectum 11/10/2017   • Numbness 2017   • H/O aortic aneurysm repair 2017   • Hypertension 08/26/2017   • GERD (gastroesophageal reflux disease) 08/26/2017   • Hyperlipidemia 03/17/2017   • Peyronie disease 12/09/2016   • Vitamin D deficiency 06/15/2016   • Atrial fibrillation ([address] ) 11/17/2014   • SHIMON (generalized anxiety disorder) 11/11/2014   • Constipation 09/19/2014   • Irritable bowel syndrome 04/24/2014   • Kidney stones 04/24/2014      LOS (days): 19  Geometric Mean LOS (GMLOS) (days): 5 00  Days to GMLOS:-13 7     OBJECTIVE:  Risk of Unplanned Readmission Score: 33 33         Current admission status: Inpatient   Preferred Pharmacy:   [facility] [address] [facility], [address] [facility] [address]  Phone: [phone] Fax: [phone]    Primary Care Provider: Don Spence MD    Primary Insurance: [address]  Secondary Insurance:     DISCHARGE DETAILS:    Other Referral/Resources/Interventions Provided:  Referral Comments: [facility] for placement  They can't accept him until they receive the Determination paperwork  It was re-submitted to them last week

## 2022-11-23 ENCOUNTER — APPOINTMENT (INPATIENT)
Dept: RADIOLOGY | Facility: HOSPITAL | Age: 62
End: 2022-11-23
Attending: INTERNAL MEDICINE

## 2022-11-23 LAB
ANION GAP SERPL CALCULATED.3IONS-SCNC: 5 MMOL/L (ref 4–13)
BACTERIA UR QL AUTO: ABNORMAL /HPF
BASOPHILS # BLD AUTO: 0.02 THOUSANDS/ÂΜL (ref 0–0.1)
BASOPHILS NFR BLD AUTO: 0 % (ref 0–1)
BILIRUB UR QL STRIP: NEGATIVE
BUN SERPL-MCNC: 15 MG/DL (ref 5–25)
CALCIUM SERPL-MCNC: 8.5 MG/DL (ref 8.3–10.1)
CAOX CRY URNS QL MICRO: ABNORMAL /HPF
CHLORIDE SERPL-SCNC: 120 MMOL/L (ref 96–108)
CLARITY UR: ABNORMAL
CO2 SERPL-SCNC: 23 MMOL/L (ref 21–32)
COLOR UR: YELLOW
CREAT SERPL-MCNC: 0.66 MG/DL (ref 0.6–1.3)
EOSINOPHIL # BLD AUTO: 0.06 THOUSAND/ÂΜL (ref 0–0.61)
EOSINOPHIL NFR BLD AUTO: 1 % (ref 0–6)
ERYTHROCYTE [DISTWIDTH] IN BLOOD BY AUTOMATED COUNT: 17.6 % (ref 11.6–15.1)
GFR SERPL CREATININE-BSD FRML MDRD: 103 ML/MIN/1.73SQ M
GLUCOSE SERPL-MCNC: 111 MG/DL (ref 65–140)
GLUCOSE UR STRIP-MCNC: NEGATIVE MG/DL
HCT VFR BLD AUTO: 33.2 % (ref 36.5–49.3)
HGB BLD-MCNC: 10.8 G/DL (ref 12–17)
HGB UR QL STRIP.AUTO: NEGATIVE
HYALINE CASTS #/AREA URNS LPF: ABNORMAL /LPF
IMM GRANULOCYTES # BLD AUTO: 0.03 THOUSAND/UL (ref 0–0.2)
IMM GRANULOCYTES NFR BLD AUTO: 1 % (ref 0–2)
INR PPP: 1.53 (ref 0.84–1.19)
KETONES UR STRIP-MCNC: NEGATIVE MG/DL
LEUKOCYTE ESTERASE UR QL STRIP: NEGATIVE
LYMPHOCYTES # BLD AUTO: 0.81 THOUSANDS/ÂΜL (ref 0.6–4.47)
LYMPHOCYTES NFR BLD AUTO: 15 % (ref 14–44)
MAGNESIUM SERPL-MCNC: 2.3 MG/DL (ref 1.6–2.6)
MCH RBC QN AUTO: 32 PG (ref 26.8–34.3)
MCHC RBC AUTO-ENTMCNC: 32.5 G/DL (ref 31.4–37.4)
MCV RBC AUTO: 98 FL (ref 82–98)
MONOCYTES # BLD AUTO: 0.33 THOUSAND/ÂΜL (ref 0.17–1.22)
MONOCYTES NFR BLD AUTO: 6 % (ref 4–12)
MUCOUS THREADS UR QL AUTO: ABNORMAL
NEUTROPHILS # BLD AUTO: 4.23 THOUSANDS/ÂΜL (ref 1.85–7.62)
NEUTS SEG NFR BLD AUTO: 77 % (ref 43–75)
NITRITE UR QL STRIP: NEGATIVE
NON-SQ EPI CELLS URNS QL MICRO: ABNORMAL /HPF
NRBC BLD AUTO-RTO: 0 /100 WBCS
PH UR STRIP.AUTO: 5.5 [PH]
PHOSPHATE SERPL-MCNC: 2.7 MG/DL (ref 2.3–4.1)
PLATELET # BLD AUTO: 138 THOUSANDS/UL (ref 149–390)
PMV BLD AUTO: 10.3 FL (ref 8.9–12.7)
POTASSIUM SERPL-SCNC: 3.2 MMOL/L (ref 3.5–5.3)
PROCALCITONIN SERPL-MCNC: 0.11 NG/ML
PROT UR STRIP-MCNC: ABNORMAL MG/DL
PROTHROMBIN TIME: 18.7 SECONDS (ref 11.6–14.5)
RBC # BLD AUTO: 3.38 MILLION/UL (ref 3.88–5.62)
RBC #/AREA URNS AUTO: ABNORMAL /HPF
SODIUM SERPL-SCNC: 148 MMOL/L (ref 135–147)
SP GR UR STRIP.AUTO: 1.03 (ref 1–1.03)
UROBILINOGEN UR STRIP-ACNC: <2 MG/DL
WBC # BLD AUTO: 5.48 THOUSAND/UL (ref 4.31–10.16)
WBC #/AREA URNS AUTO: ABNORMAL /HPF

## 2022-11-23 RX ADMIN — PANTOPRAZOLE SODIUM 40 MG: 40 TABLET, DELAYED RELEASE ORAL at 05:34

## 2022-11-23 RX ADMIN — WARFARIN SODIUM 5 MG: 5 TABLET ORAL at 13:59

## 2022-11-23 RX ADMIN — BUSPIRONE HYDROCHLORIDE 5 MG: 5 TABLET ORAL at 13:56

## 2022-11-23 RX ADMIN — ENOXAPARIN SODIUM 90 MG: 100 INJECTION SUBCUTANEOUS at 13:56

## 2022-11-23 RX ADMIN — CYANOCOBALAMIN TAB 500 MCG 500 MCG: 500 TAB at 13:56

## 2022-11-23 RX ADMIN — METOROPROLOL TARTRATE 5 MG: 5 INJECTION, SOLUTION INTRAVENOUS at 22:46

## 2022-11-23 RX ADMIN — LORAZEPAM 0.5 MG: 0.5 TABLET ORAL at 13:56

## 2022-11-23 RX ADMIN — ENOXAPARIN SODIUM 90 MG: 100 INJECTION SUBCUTANEOUS at 22:49

## 2022-11-23 RX ADMIN — B-COMPLEX W/ C & FOLIC ACID TAB 1 TABLET: TAB at 13:56

## 2022-11-23 RX ADMIN — METOPROLOL SUCCINATE 100 MG: 100 TABLET, EXTENDED RELEASE ORAL at 13:56

## 2022-11-23 RX ADMIN — THIAMINE HCL TAB 100 MG 100 MG: 100 TAB at 13:56

## 2022-11-23 RX ADMIN — FOLIC ACID 1 MG: 1 TABLET ORAL at 14:00

## 2022-11-23 RX ADMIN — ESCITALOPRAM OXALATE 10 MG: 10 TABLET ORAL at 13:56

## 2022-11-23 RX ADMIN — POTASSIUM CHLORIDE 40 MEQ: 1500 TABLET, EXTENDED RELEASE ORAL at 13:56

## 2022-11-23 NOTE — PLAN OF CARE
Problem: Potential for Falls  Goal: Patient will remain free of falls  Description: INTERVENTIONS:  - Educate patient/family on patient safety including physical limitations  - Instruct patient to call for assistance with activity   - Consult OT/PT to assist with strengthening/mobility   - Keep Call bell within reach  - Keep bed low and locked with side rails adjusted as appropriate  - Keep care items and personal belongings within reach  - Apply yellow socks and bracelet for high fall risk patients  - Consider moving patient to room near nurses station  Outcome: Progressing     Problem: SAFETY ADULT  Goal: Patient will remain free of falls  Description: INTERVENTIONS:  - Educate patient/family on patient safety including physical limitations  - Instruct patient to call for assistance with activity   - Consult OT/PT to assist with strengthening/mobility   - Keep Call bell within reach  - Keep bed low and locked with side rails adjusted as appropriate  - Keep care items and personal belongings within reach  - Initiate and maintain comfort rounds  - Apply yellow socks and bracelet for high fall risk patients  - Consider moving patient to room near nurses station  Outcome: Progressing

## 2022-11-23 NOTE — PROGRESS NOTES
[facility]  Progress Note - Charolett Begin 1960, 58 y o  male MRN: [id number]  [facility] Encounter: [id number]  Primary Care Provider: Mally Lau MD   Date and time admitted to hospital: 11/3/2022  3:28 PM      * SHIMON (generalized anxiety disorder)  Assessment & Plan  • Previous admission from the end of September until the end of October at [address] [place] [address] in which he was hospitalized for over a month for severe anxiety and inability to care for self  • Per sister reportedly was able to ambulate prior to the hospitalization but after the hospitalization patient needing assistance with ambulation at home  • During hospitalization there patient developed hyponatremia during his course of stay and Zoloft that had been started was stopped due to hyponatremia  • PRN lorazepam ordered   • Recently discharged from inpatient psychiatry with mirtazapine and aripiprazole, discussed resumption of thesewith the patient but he reports excess sedation with these  • D/w psychiatry earlier this week started buspar 5mg BID, and lexapro  • He does admit to depressive symptoms and anxiety, discussing certain topics with him result is worsening tremor  • Monitor response to antidepressants  • Reconsulted psychiatry for re-evaluation / possible IP psych admission for [address] [place] [address] on 11/18 recommended inpatient psychiatry admission, in-person evaluation with our on-site psychiatrist 11/21 did not feel this was necessary  Recommended increasing escitalopram, making lorazepam scheduled as patient does not ask for it but his anxiety might benefit from it, and continuing BusPar  Overall plan is to help patient improve his anxiety so that he can get to rehab    • Per updated discussion w/ psychiatry -> continue current Lexapro/Buspar/Ativan regimen -> okay for discharge to skilled rehab from a psychiatric point of view -> awaiting placement, however, patient likely undergoing "OPTIONS" process prior to skilled rehab placement due to underlying psychiatric history             Cognitive impairment  Assessment & Plan  · Per neuropsychology evaluation on 11/14, patient does NOT have capacity to make medical decisions   · Patient also has some level of cognitive impairment   · B12 borderline low -> s/p one time intramuscular B12 shot -> continue supplementation  · Per nursing today, patient intermittently refusing medications     Hyperthyroidism  Assessment & Plan  · Per chart review patient has had low TSH and elevated free T4 in the past   · Most recent labs during this admission with TSH 0 282 and free t4 1 56  · Thyroid U/S during last admission at [address] [place] [address] was unremarkable   · Evaluated by endocrinology in the past during previous admission at [address] [place] [address] (oct 22) and they recommend get repeat TFT, TSI and thyroid abs   · Appreciate endocrinology evaluation this hospital course -> likely subclinical hyperthyroidism -> repeat thyroid testing in 4-6 weeks once acute medical issues stabilized    Severe protein-calorie malnutrition   Assessment & Plan  · BMI of 29 10 in the setting of decreased appetite/oral intake and unintentional weight loss  · Continue nutritional supplementation w/ meals     Hypernatremia  Assessment & Plan  · On presentation: sodium 151 -> progressively improved currently at 148 over the last two days  · Appreciate nephrology input  · Encourage increased oral intake  · Continue serum sodium monitoring    Hypokalemia  Assessment & Plan  • Monitor/replete serum potassium and magnesium as necessary     History of CVA (cerebral vascular accident)   Assessment & Plan  · H/o CVA x 3 in setting of antiphospholipid syndrome  · On Coumadin for anticoagulation  · CT head in ER: No acute intracranial abnormality   Encephalomalacia involving the right occipital lobe and bilateral cerebelli   Grossly stable appearance of periventricular hypoattenuation compared to MRI brain 8/29/2022, likely representing chronic microvascular ischemic changes  · Appreciate PT/OT input -> awaiting skilled rehab placement     Tremor of right hand  Assessment & Plan  • Outpatient follow-up w/ neurology - consider sequela of prior strokes ? ?     Antiphospholipid antibody with hypercoagulable state  Assessment & Plan  · Continue Coumadin regimen - had ischemic stroke in the past despite Eliquis, hence, deemed Eliquis failure  · Due to subtherapeutic INR, Coumadin being bridged w/ therapeutic Lovenox currently     Atrial fibrillation   Assessment & Plan  · Rate controlled on Toprol-XL  · Continue Coumadin for anticoagulation (w/ therapeutic Lovenox bridge until INR at goal)        DVT Prophylaxis:  therapeutic Lovenox - Coumadin bridge    Patient Centered Rounds:  I have performed bedside rounds and discussed plan of care with nursing today  Discussions with Specialists or Other Care Team Provider:  see above assessments if applicable    Education and Discussions with Family / Patient: Patient at bedside - discussed with sister, in-depth, at bedside today, who is aware of pending placement    Time Spent for Care:  32 minutes  More than 50% of total time spent on counseling and coordination of care as described above  Current Length of Stay: 20 day(s)  Current Patient Status: Inpatient   Certification Statement:  Patient will continue to require additional hospital stay due to assessments as noted above  Code Status: Level 3 - DNAR and DNI        Subjective:     Wax/wane weakness/fatigue noted  Per nursing, he is intermittently refusing medications today  At the time my encounter, he has a relatively flat affect with minimal desire/intent to talk          Objective:     Vitals:   Temp (24hrs), Av 6 °F (37 °C), Min:96 4 °F (35 8 °C), Max:100 2 °F (37 9 °C)    Temp:  [96 4 °F (35 8 °C)-100 2 °F (37 9 °C)] 99 7 °F (37 6 °C)  HR:  [102-106] 106  Resp:  [18] 18  BP: (145-153)/() 153/102  SpO2:  [95 %-96 %] 95 %  Body mass index is 29 33 kg/m²  Input and Output Summary (last 24 hours): Intake/Output Summary (Last 24 hours) at 11/23/2022 1622  Last data filed at 11/23/2022 1401  Gross per 24 hour   Intake 240 ml   Output 1075 ml   Net -835 ml       Physical Exam:     GENERAL:  Remains weak/fatigued  HEAD:  Normocephalic - atraumatic  EYES: PERRL - EOMI   MOUTH:  Mucosa moist  NECK:  Supple - full range of motion  CARDIAC:  Rate controlled - S1/S2 positive  PULMONARY:  Fairly clear to auscultation - nonlabored respirations at rest  ABDOMEN:  Soft - nontender/nondistended - active bowel sounds  MUSCULOSKELETAL:  Motor strength/range of motion deconditioned  NEUROLOGIC:  Alert/oriented at baseline - refuses to speak  SKIN:  Chronic wrinkles/blemishes   PSYCHIATRIC:  Mood/affect remains flat      Additional Data:     Labs & Recent Cultures:    Results from last 7 days   Lab Units 11/23/22  0501   WBC Thousand/uL 5 48   HEMOGLOBIN g/dL 10 8*   HEMATOCRIT % 33 2*   PLATELETS Thousands/uL 138*   NEUTROS PCT % 77*   LYMPHS PCT % 15   MONOS PCT % 6   EOS PCT % 1     Results from last 7 days   Lab Units 11/23/22  0501   POTASSIUM mmol/L 3 2*   CHLORIDE mmol/L 120*   CO2 mmol/L 23   BUN mg/dL 15   CREATININE mg/dL 0 66   CALCIUM mg/dL 8 5     Results from last 7 days   Lab Units 11/23/22  0501   INR  1 53*             Results from last 7 days   Lab Units 11/23/22  1107   PROCALCITONIN ng/ml 0 11         Results from last 7 days   Lab Units 11/23/22  1106   BLOOD CULTURE  Received in Microbiology Lab  Culture in Progress           Lines/Drains:  Invasive Devices     Peripheral Intravenous Line  Duration           Peripheral IV 11/23/22 Right;Ventral (anterior) Forearm <1 day                  Last 24 Hours Medication List:   Current Facility-Administered Medications   Medication Dose Route Frequency Provider Last Rate   • busPIRone  5 mg Oral BID Rory Gonzalez MD     • vitamin B-12  500 mcg Oral Daily Marky Palomino DO • cyanocobalamin  1,000 mcg Intramuscular Q30 Days Jasmit Kaylie, DO     • enoxaparin  1 mg/kg Subcutaneous Q12H Dimitry Melgoza MD     • escitalopram  10 mg Oral Daily Pam Salter MD     • fluticasone  1 spray Each Nare Daily Jasmit Kaylie, DO     • folic acid  1 mg Oral Daily Kelsey Moreno MD     • LORazepam  0 5 mg Oral BID Ailyn De La O MD     • metoprolol  5 mg Intravenous Q6H PRN Carolynn Gupta MD     • metoprolol succinate  100 mg Oral Daily Ailyn De La O MD     • multivitamin stress formula  1 tablet Oral Daily Kelsey Moreno MD     • pantoprazole  40 mg Oral Early Morning Brandoit Kaylie, DO     • potassium chloride  40 mEq Oral BID Ailyn De La O MD     • senna  1 tablet Oral HS PRN Ailyn De La O MD     • sodium chloride  1 spray Each Nare Q1H PRN Ailyn De La O MD     • thiamine  100 mg Oral Daily Kelsey Moreno MD     • warfarin  5 mg Oral QAM Ailyn De La O MD                        ** Please Note: This note is constructed using a voice recognition dictation system  An occasional wrong word/phrase or “sound-a-like” substitution may have been picked up by dictation device due to the inherent limitations of voice recognition software  Read the chart carefully and recognize, using reasonable context, where substitutions may have occurred  **

## 2022-11-23 NOTE — CASE MANAGEMENT
Case Management Discharge Planning Note    Patient name Luther Begin  Location [address] 9[address]/[facility] 466-38 MRN [id number]  : 1960 Date 2022       Current Admission Date: 11/3/2022  Current Admission Diagnosis:SHIMON (generalized anxiety disorder)   Patient Active Problem List    Diagnosis Date Noted   • Impaired decision making 2022   • Hyperthyroidism 11/10/2022   • Severe protein-calorie malnutrition ([facility] ) 2022   • Hypernatremia 2022   • Hyponatremia 10/14/2022   • SIADH (syndrome of inappropriate ADH production) ([facility] ) 10/14/2022   • Subclinical hyperthyroidism 10/14/2022   • Sinus arrhythmia 10/10/2022   • Mild protein-calorie malnutrition ([facility] ) 10/07/2022   • Medical clearance for incarceration 2022   • 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 attack 2022   • Atypical chest pain 2022   • Hypokalemia 2022   • Dizziness 2022   • Renal cyst 2022   • PVD (peripheral vascular disease) ([facility] ) 2022   • Ataxia 2022   • [facility] vascular [facility]) [facility] 20[facility]   • Right inguinal pain 2022   • Alkaline phosphatase elevation 2022   • Low back pain 2022   • S/P laparoscopic hernia repair 2021   • Non-recurrent bilateral inguinal hernia without obstruction or gangrene 2021   • Gastric polyps 2021   • Gastric AVM 2021   • Edema of both lower legs 2021   • Tremor of right hand 2021   • Weakness of both lower extremities 2021   • Anticoagulated on Coumadin 2021   • CORIE (obstructive sleep apnea)    • Hyperbilirubinemia 08/10/2020   • Antiphospholipid antibody with hypercoagulable state ([facility] ) 2020   • History of stroke 2020   • Other viral warts 2019   • Physical deconditioning 2019   • Class 2 obesity in adult 2018   • Bright red blood per rectum 11/10/2017   • Numbness 2017   • H/O aortic aneurysm repair 2017   • Hypertension 08/26/2017   • GERD (gastroesophageal reflux disease) 08/26/2017   • Hyperlipidemia 03/17/2017   • Peyronie disease 12/09/2016   • Vitamin D deficiency 06/15/2016   • Atrial fibrillation ([address] ) 11/17/2014   • SHIMON (generalized anxiety disorder) 11/11/2014   • Constipation 09/19/2014   • Irritable bowel syndrome 04/24/2014   • Kidney stones 04/24/2014      LOS (days): 20  Geometric Mean LOS (GMLOS) (days): 5 00  Days to GMLOS:-14 7     OBJECTIVE:  Risk of Unplanned Readmission Score: 38 73         Current admission status: Inpatient   Preferred Pharmacy:   [address] [place] [address]  Phone: [phone] Fax: [phone]    Primary Care Provider: Alaina Villa MD    Primary Insurance: [address] [place] [address]  Secondary Insurance:     DISCHARGE DETAILS:    Discharge planning discussed with[de-identified] Dominic Shaw (sister)  Freedom of Choice: Yes     CM contacted family/caregiver?: Yes  Were Treatment Team discharge recommendations reviewed with patient/caregiver?: Yes  Did patient/caregiver verbalize understanding of patient care needs?: Yes  Were patient/caregiver advised of the risks associated with not following Treatment Team discharge recommendations?: Yes    Contacts  Patient Contacts: Garrison Acevedo (sister)  Relationship to Patient[de-identified] Family  Contact Method: Phone  Phone Number: [phone]  Reason/Outcome: Continuity of Care, Emergency Contact, Discharge Planning, Referral              Other Referral/Resources/Interventions Provided:  Referral Comments: JORGE spoke with Hiwot from [address]  They still need the level of determination back from the [facility] prior to accepting  Their open beds at this time are the [facility] location as well as possibly [address]  Family at this time prefers the [facility] location  [address] is aware                  IMM Given (Date):: 11/23/22  IMM Given to[de-identified] Family  Family notified[de-identified] Dominic Shaw  Additional Comments: JORGE called and spoke with both Letty Carpenter and Janes Espinosa from [facility] regarding the [address] paperwork  They were able to confirm that the determination is complete at this time and it resulted that the patient is nursing home eligible  Fax and email provided to them as  still has yet to receive any determination at this time  Currently waiting on official letter as facility is unable to accept without this in hand

## 2022-11-24 PROBLEM — R19.7 DIARRHEA: Status: ACTIVE | Noted: 2022-01-01

## 2022-11-24 LAB
ANION GAP SERPL CALCULATED.3IONS-SCNC: 7 MMOL/L (ref 4–13)
BASOPHILS # BLD AUTO: 0.02 THOUSANDS/ÂΜL (ref 0–0.1)
BASOPHILS NFR BLD AUTO: 0 % (ref 0–1)
BUN SERPL-MCNC: 14 MG/DL (ref 5–25)
CALCIUM SERPL-MCNC: 8.6 MG/DL (ref 8.3–10.1)
CHLORIDE SERPL-SCNC: 116 MMOL/L (ref 96–108)
CO2 SERPL-SCNC: 25 MMOL/L (ref 21–32)
CREAT SERPL-MCNC: 0.53 MG/DL (ref 0.6–1.3)
EOSINOPHIL # BLD AUTO: 0.05 THOUSAND/ÂΜL (ref 0–0.61)
EOSINOPHIL NFR BLD AUTO: 1 % (ref 0–6)
ERYTHROCYTE [DISTWIDTH] IN BLOOD BY AUTOMATED COUNT: 17 % (ref 11.6–15.1)
GFR SERPL CREATININE-BSD FRML MDRD: 113 ML/MIN/1.73SQ M
GLUCOSE SERPL-MCNC: 114 MG/DL (ref 65–140)
HCT VFR BLD AUTO: 34 % (ref 36.5–49.3)
HGB BLD-MCNC: 10.9 G/DL (ref 12–17)
IMM GRANULOCYTES # BLD AUTO: 0.01 THOUSAND/UL (ref 0–0.2)
IMM GRANULOCYTES NFR BLD AUTO: 0 % (ref 0–2)
INR PPP: 1.56 (ref 0.84–1.19)
LYMPHOCYTES # BLD AUTO: 0.73 THOUSANDS/ÂΜL (ref 0.6–4.47)
LYMPHOCYTES NFR BLD AUTO: 14 % (ref 14–44)
MAGNESIUM SERPL-MCNC: 2.2 MG/DL (ref 1.6–2.6)
MCH RBC QN AUTO: 31.6 PG (ref 26.8–34.3)
MCHC RBC AUTO-ENTMCNC: 32.1 G/DL (ref 31.4–37.4)
MCV RBC AUTO: 99 FL (ref 82–98)
MONOCYTES # BLD AUTO: 0.32 THOUSAND/ÂΜL (ref 0.17–1.22)
MONOCYTES NFR BLD AUTO: 6 % (ref 4–12)
NEUTROPHILS # BLD AUTO: 4.13 THOUSANDS/ÂΜL (ref 1.85–7.62)
NEUTS SEG NFR BLD AUTO: 79 % (ref 43–75)
NRBC BLD AUTO-RTO: 0 /100 WBCS
PHOSPHATE SERPL-MCNC: 2.9 MG/DL (ref 2.3–4.1)
PLATELET # BLD AUTO: 134 THOUSANDS/UL (ref 149–390)
PMV BLD AUTO: 10.2 FL (ref 8.9–12.7)
POTASSIUM SERPL-SCNC: 3 MMOL/L (ref 3.5–5.3)
PROTHROMBIN TIME: 18.9 SECONDS (ref 11.6–14.5)
RBC # BLD AUTO: 3.45 MILLION/UL (ref 3.88–5.62)
SODIUM SERPL-SCNC: 148 MMOL/L (ref 135–147)
WBC # BLD AUTO: 5.26 THOUSAND/UL (ref 4.31–10.16)

## 2022-11-24 PROCEDURE — 0D9P70Z DRAINAGE OF RECTUM WITH DRAINAGE DEVICE, VIA NATURAL OR ARTIFICIAL OPENING: ICD-10-PCS | Performed by: INTERNAL MEDICINE

## 2022-11-24 RX ORDER — LOPERAMIDE HYDROCHLORIDE 2 MG/1
2 CAPSULE ORAL 3 TIMES DAILY PRN
Status: DISCONTINUED | OUTPATIENT
Start: 2022-11-24 | End: 2022-11-27

## 2022-11-24 RX ORDER — POTASSIUM CHLORIDE AND SODIUM CHLORIDE 900; 300 MG/100ML; MG/100ML
100 INJECTION, SOLUTION INTRAVENOUS CONTINUOUS
Status: DISCONTINUED | OUTPATIENT
Start: 2022-11-24 | End: 2022-11-29

## 2022-11-24 RX ADMIN — CYANOCOBALAMIN TAB 500 MCG 500 MCG: 500 TAB at 12:11

## 2022-11-24 RX ADMIN — POTASSIUM CHLORIDE 40 MEQ: 1500 TABLET, EXTENDED RELEASE ORAL at 17:55

## 2022-11-24 RX ADMIN — ENOXAPARIN SODIUM 90 MG: 100 INJECTION SUBCUTANEOUS at 20:50

## 2022-11-24 RX ADMIN — B-COMPLEX W/ C & FOLIC ACID TAB 1 TABLET: TAB at 12:11

## 2022-11-24 RX ADMIN — POTASSIUM CHLORIDE AND SODIUM CHLORIDE 100 ML/HR: 900; 300 INJECTION, SOLUTION INTRAVENOUS at 12:10

## 2022-11-24 RX ADMIN — FOLIC ACID 1 MG: 1 TABLET ORAL at 12:11

## 2022-11-24 RX ADMIN — LORAZEPAM 0.5 MG: 0.5 TABLET ORAL at 12:11

## 2022-11-24 RX ADMIN — LORAZEPAM 0.5 MG: 0.5 TABLET ORAL at 17:54

## 2022-11-24 RX ADMIN — ESCITALOPRAM OXALATE 10 MG: 10 TABLET ORAL at 12:11

## 2022-11-24 RX ADMIN — ENOXAPARIN SODIUM 90 MG: 100 INJECTION SUBCUTANEOUS at 12:10

## 2022-11-24 RX ADMIN — WARFARIN SODIUM 5 MG: 5 TABLET ORAL at 12:14

## 2022-11-24 RX ADMIN — METOPROLOL SUCCINATE 100 MG: 100 TABLET, EXTENDED RELEASE ORAL at 12:11

## 2022-11-24 RX ADMIN — THIAMINE HCL TAB 100 MG 100 MG: 100 TAB at 12:14

## 2022-11-24 RX ADMIN — BUSPIRONE HYDROCHLORIDE 5 MG: 5 TABLET ORAL at 12:11

## 2022-11-24 RX ADMIN — POTASSIUM CHLORIDE 40 MEQ: 1500 TABLET, EXTENDED RELEASE ORAL at 12:11

## 2022-11-24 RX ADMIN — BUSPIRONE HYDROCHLORIDE 5 MG: 5 TABLET ORAL at 17:54

## 2022-11-24 NOTE — PROGRESS NOTES
Patient with persistent liquid diarrhea  Will start as needed Imodium and insert rectal tube  Monitor stool output  No concern for C diff at this time

## 2022-11-24 NOTE — PROGRESS NOTES
[facility]  Progress Note - Rohan Ortiz 1960, 58 y o  male MRN: [id number]  Unit/Bed#: [facility] 907-01 Encounter: [id number]  Primary Care Provider: Jeffrey Egan MD   Date and time admitted to hospital: 11/3/2022  3:28 PM      * SHIMON (generalized anxiety disorder)  Assessment & Plan  • Previous admission from the end of September until the end of October at [facility] in which he was hospitalized for over a month for severe anxiety and inability to care for self  • Per sister reportedly was able to ambulate prior to the hospitalization but after the hospitalization patient needing assistance with ambulation at home  • During hospitalization there patient developed hyponatremia during his course of stay and Zoloft that had been started was stopped due to hyponatremia  • PRN lorazepam ordered   • Recently discharged from inpatient psychiatry with mirtazapine and aripiprazole, discussed resumption of thesewith the patient but he reports excess sedation with these  • D/w psychiatry earlier this week started buspar 5mg BID, and lexapro  • He does admit to depressive symptoms and anxiety, discussing certain topics with him result is worsening tremor  • Monitor response to antidepressants  • Reconsulted psychiatry for re-evaluation / possible IP psych admission for [facility] psychiatry on 11/18 recommended inpatient psychiatry admission, in-person evaluation with our on-site psychiatrist 11/21 did not feel this was necessary  Recommended increasing escitalopram, making lorazepam scheduled as patient does not ask for it but his anxiety might benefit from it, and continuing BusPar  Overall plan is to help patient improve his anxiety so that he can get to rehab    • Per updated discussion w/ psychiatry -> continue current Lexapro/Buspar/Ativan regimen -> okay for discharge to skilled rehab from a psychiatric point of view -> awaiting placement, however, patient likely undergoing "OPTIONS" process prior to skilled rehab placement due to underlying psychiatric history             Cognitive impairment  Assessment & Plan  · Per neuropsychology evaluation on 11/14, patient does NOT have capacity to make medical decisions   · Patient also has some level of cognitive impairment   · B12 borderline low -> s/p one time intramuscular B12 shot -> continue supplementation  · Patient intermittently refusing medications per nursing     Hyperthyroidism  Assessment & Plan  · Per chart review patient has had low TSH and elevated free T4 in the past   · Most recent labs during this admission with TSH 0 282 and free t4 1 56  · Thyroid U/S during last admission at [address] [place] [address] was unremarkable   · Evaluated by endocrinology in the past during previous admission at [address] [place] [address] (oct 22) and they recommend get repeat TFT, TSI and thyroid abs   · Appreciate endocrinology evaluation this hospital course -> likely subclinical hyperthyroidism -> repeat thyroid testing in 4-6 weeks once acute medical issues stabilized    Severe protein-calorie malnutrition   Assessment & Plan  · BMI of 29 10 in the setting of decreased appetite/oral intake and unintentional weight loss  · Continue nutritional supplementation w/ meals     Hypernatremia  Assessment & Plan  · On presentation: sodium 151 -> progressively improved currently at 148 over the last three days  · Appreciate nephrology input  · Encourage increased oral intake  · Continue serum sodium monitoring    Diarrhea  Assessment & Plan  • Medications reviewed and ordered Senokot -> now discontinued  • Overnight, was ordered PRN Imodium   • Continue IV fluid hydration and monitor/replete electrolyte deficiencies if present    Hypokalemia  Assessment & Plan  • Monitor/replete serum potassium and magnesium as necessary     History of CVA (cerebral vascular accident)   Assessment & Plan  · H/o CVA x 3 in setting of antiphospholipid syndrome  · On Coumadin for anticoagulation  · CT head in ER: No acute intracranial abnormality   Encephalomalacia involving the right occipital lobe and bilateral cerebelli   Grossly stable appearance of periventricular hypoattenuation compared to MRI brain 2022, likely representing chronic microvascular ischemic changes  · Appreciate PT/OT input -> awaiting skilled rehab placement     Tremor of right hand  Assessment & Plan  • Outpatient follow-up w/ neurology - consider sequela of prior strokes ? ?     Antiphospholipid antibody with hypercoagulable state  Assessment & Plan  · Continue Coumadin regimen - had ischemic stroke in the past despite Eliquis, hence, deemed Eliquis failure  · Due to subtherapeutic INR, Coumadin being bridged w/ therapeutic Lovenox currently     Atrial fibrillation   Assessment & Plan  · Rate controlled on Toprol-XL  · Continue Coumadin for anticoagulation (w/ therapeutic Lovenox bridge until INR at goal)        DVT Prophylaxis:  therapeutic Lovenox - Coumadin bridge    Patient Centered Rounds:  I have performed bedside rounds and discussed plan of care with nursing today  Discussions with Specialists or Other Care Team Provider:  see above assessments if applicable    Education and Discussions with Family / Patient: Patient at bedside - sister aware of pending placement    Time Spent for Care:  32 minutes  More than 50% of total time spent on counseling and coordination of care as described above  Current Length of Stay: 21 day(s)  Current Patient Status: Inpatient   Certification Statement:  Patient will continue to require additional hospital stay due to assessments as noted above  Code Status: Level 3 - DNAR and DNI        Subjective:     Remains weak/fatigued  Denies pain at this time, however  Minimally talkative today          Objective:     Vitals:   Temp (24hrs), Av 7 °F (36 5 °C), Min:97 2 °F (36 2 °C), Max:98 3 °F (36 8 °C)    Temp:  [97 2 °F (36 2 °C)-98 3 °F (36 8 °C)] 97 7 °F (36 5 °C)  HR:  [] 111  Resp: [16-19] 19  BP: (144-188)/() 158/114  SpO2:  [93 %-98 %] 95 %  Body mass index is 28 22 kg/m²  Input and Output Summary (last 24 hours): Intake/Output Summary (Last 24 hours) at 11/24/2022 1713  Last data filed at 11/24/2022 1301  Gross per 24 hour   Intake 85 ml   Output --   Net 85 ml       Physical Exam:     GENERAL:  Weak/fatigued  HEAD:  Normocephalic - atraumatic  EYES: PERRL - EOMI   MOUTH:  Mucosa moist  NECK:  Supple - full range of motion  CARDIAC:  Rate controlled - S1/S2 positive  PULMONARY:  Clear to auscultation bilaterally - nonlabored respirations at rest  ABDOMEN:  Soft - nontender/nondistended - active bowel sounds  MUSCULOSKELETAL:  Motor strength/range of motion deconditioned  NEUROLOGIC:  Alert/oriented at baseline - minimally talkative  SKIN:  Chronic wrinkles/blemishes   PSYCHIATRIC:  Mood/affect flat      Additional Data:     Labs & Recent Cultures:    Results from last 7 days   Lab Units 11/24/22  0521   WBC Thousand/uL 5 26   HEMOGLOBIN g/dL 10 9*   HEMATOCRIT % 34 0*   PLATELETS Thousands/uL 134*   NEUTROS PCT % 79*   LYMPHS PCT % 14   MONOS PCT % 6   EOS PCT % 1     Results from last 7 days   Lab Units 11/24/22  0521   POTASSIUM mmol/L 3 0*   CHLORIDE mmol/L 116*   CO2 mmol/L 25   BUN mg/dL 14   CREATININE mg/dL 0 53*   CALCIUM mg/dL 8 6     Results from last 7 days   Lab Units 11/24/22  0521   INR  1 56*             Results from last 7 days   Lab Units 11/23/22  1107   PROCALCITONIN ng/ml 0 11         Results from last 7 days   Lab Units 11/23/22  1122 11/23/22  1106   BLOOD CULTURE  No Growth at 24 hrs  No Growth at 24 hrs           Lines/Drains:  Invasive Devices     Peripheral Intravenous Line  Duration           Peripheral IV 11/23/22 Right;Ventral (anterior) Forearm 1 day          Drain  Duration           Rectal Tube With balloon <1 day                  Last 24 Hours Medication List:   Current Facility-Administered Medications   Medication Dose Route Frequency Provider Last Rate   • busPIRone  5 mg Oral BID Eduard Carson MD     • vitamin B-12  500 mcg Oral Daily Jasmit Kaylie, DO     • cyanocobalamin  1,000 mcg Intramuscular Q30 Days Jasmit Kaylie, DO     • enoxaparin  1 mg/kg Subcutaneous Q12H [facility] Eduard Carson MD     • escitalopram  10 mg Oral Daily Alla Salter MD     • fluticasone  1 spray Each Nare Daily Jasmit Kaylie, DO     • folic acid  1 mg Oral Daily Eduard Carson MD     • loperamide  2 mg Oral TID PRN Jennifermarisa Stewart, DO     • LORazepam  0 5 mg Oral BID Amy Elias MD     • metoprolol  5 mg Intravenous Q6H PRN Baljeet Hamilton MD     • metoprolol succinate  100 mg Oral Daily Amy Elias MD     • multivitamin stress formula  1 tablet Oral Daily Eduard Carson MD     • pantoprazole  40 mg Oral Early Morning Jasmit Kaylie, DO     • potassium chloride  40 mEq Oral BID Amy Elias MD     • sodium chloride  1 spray Each Nare Q1H PRN Amy Elias MD     • sodium chloride 0 9 % with KCl 40 mEq/L  100 mL/hr Intravenous Continuous Baljeet Hamilton  mL/hr (11/24/22 1210)   • thiamine  100 mg Oral Daily Eduard Carson MD     • warfarin  5 mg Oral QAM Amy Elias MD                        ** Please Note: This note is constructed using a voice recognition dictation system  An occasional wrong word/phrase or “sound-a-like” substitution may have been picked up by dictation device due to the inherent limitations of voice recognition software  Read the chart carefully and recognize, using reasonable context, where substitutions may have occurred  **

## 2022-11-25 PROBLEM — R41.89 COGNITIVE IMPAIRMENT: Status: ACTIVE | Noted: 2022-11-14

## 2022-11-25 PROBLEM — Z86.73 HISTORY OF CVA (CEREBROVASCULAR ACCIDENT): Status: ACTIVE | Noted: 2022-04-19

## 2022-11-25 LAB
ANION GAP SERPL CALCULATED.3IONS-SCNC: 4 MMOL/L (ref 4–13)
BASOPHILS # BLD AUTO: 0.01 THOUSANDS/ÂΜL (ref 0–0.1)
BASOPHILS NFR BLD AUTO: 0 % (ref 0–1)
BUN SERPL-MCNC: 13 MG/DL (ref 5–25)
CALCIUM SERPL-MCNC: 8 MG/DL (ref 8.3–10.1)
CHLORIDE SERPL-SCNC: 118 MMOL/L (ref 96–108)
CO2 SERPL-SCNC: 24 MMOL/L (ref 21–32)
CREAT SERPL-MCNC: 0.47 MG/DL (ref 0.6–1.3)
EOSINOPHIL # BLD AUTO: 0.07 THOUSAND/ÂΜL (ref 0–0.61)
EOSINOPHIL NFR BLD AUTO: 1 % (ref 0–6)
ERYTHROCYTE [DISTWIDTH] IN BLOOD BY AUTOMATED COUNT: 17 % (ref 11.6–15.1)
GFR SERPL CREATININE-BSD FRML MDRD: 119 ML/MIN/1.73SQ M
GLUCOSE SERPL-MCNC: 109 MG/DL (ref 65–140)
HCT VFR BLD AUTO: 34.2 % (ref 36.5–49.3)
HGB BLD-MCNC: 11.1 G/DL (ref 12–17)
IMM GRANULOCYTES # BLD AUTO: 0.02 THOUSAND/UL (ref 0–0.2)
IMM GRANULOCYTES NFR BLD AUTO: 0 % (ref 0–2)
INR PPP: 1.54 (ref 0.84–1.19)
LYMPHOCYTES # BLD AUTO: 0.67 THOUSANDS/ÂΜL (ref 0.6–4.47)
LYMPHOCYTES NFR BLD AUTO: 11 % (ref 14–44)
MAGNESIUM SERPL-MCNC: 2.1 MG/DL (ref 1.6–2.6)
MCH RBC QN AUTO: 32.2 PG (ref 26.8–34.3)
MCHC RBC AUTO-ENTMCNC: 32.5 G/DL (ref 31.4–37.4)
MCV RBC AUTO: 99 FL (ref 82–98)
MONOCYTES # BLD AUTO: 0.36 THOUSAND/ÂΜL (ref 0.17–1.22)
MONOCYTES NFR BLD AUTO: 6 % (ref 4–12)
NEUTROPHILS # BLD AUTO: 4.78 THOUSANDS/ÂΜL (ref 1.85–7.62)
NEUTS SEG NFR BLD AUTO: 82 % (ref 43–75)
NRBC BLD AUTO-RTO: 0 /100 WBCS
PHOSPHATE SERPL-MCNC: 2.7 MG/DL (ref 2.3–4.1)
PLATELET # BLD AUTO: 158 THOUSANDS/UL (ref 149–390)
PMV BLD AUTO: 10.4 FL (ref 8.9–12.7)
POTASSIUM SERPL-SCNC: 3.2 MMOL/L (ref 3.5–5.3)
PROTHROMBIN TIME: 18.7 SECONDS (ref 11.6–14.5)
RBC # BLD AUTO: 3.45 MILLION/UL (ref 3.88–5.62)
SODIUM SERPL-SCNC: 146 MMOL/L (ref 135–147)
WBC # BLD AUTO: 5.91 THOUSAND/UL (ref 4.31–10.16)

## 2022-11-25 RX ADMIN — FLUTICASONE PROPIONATE 1 SPRAY: 50 SPRAY, METERED NASAL at 10:39

## 2022-11-25 RX ADMIN — CYANOCOBALAMIN TAB 500 MCG 500 MCG: 500 TAB at 10:26

## 2022-11-25 RX ADMIN — ENOXAPARIN SODIUM 90 MG: 100 INJECTION SUBCUTANEOUS at 21:22

## 2022-11-25 RX ADMIN — POTASSIUM CHLORIDE AND SODIUM CHLORIDE 100 ML/HR: 900; 300 INJECTION, SOLUTION INTRAVENOUS at 21:22

## 2022-11-25 RX ADMIN — WARFARIN SODIUM 5 MG: 5 TABLET ORAL at 10:26

## 2022-11-25 RX ADMIN — B-COMPLEX W/ C & FOLIC ACID TAB 1 TABLET: TAB at 10:26

## 2022-11-25 RX ADMIN — LORAZEPAM 0.5 MG: 0.5 TABLET ORAL at 10:26

## 2022-11-25 RX ADMIN — FOLIC ACID 1 MG: 1 TABLET ORAL at 10:26

## 2022-11-25 RX ADMIN — ESCITALOPRAM OXALATE 10 MG: 10 TABLET ORAL at 10:26

## 2022-11-25 RX ADMIN — LORAZEPAM 0.5 MG: 0.5 TABLET ORAL at 17:32

## 2022-11-25 RX ADMIN — POTASSIUM CHLORIDE 40 MEQ: 1500 TABLET, EXTENDED RELEASE ORAL at 17:32

## 2022-11-25 RX ADMIN — METOPROLOL SUCCINATE 100 MG: 100 TABLET, EXTENDED RELEASE ORAL at 10:26

## 2022-11-25 RX ADMIN — ENOXAPARIN SODIUM 90 MG: 100 INJECTION SUBCUTANEOUS at 10:26

## 2022-11-25 RX ADMIN — BUSPIRONE HYDROCHLORIDE 5 MG: 5 TABLET ORAL at 10:26

## 2022-11-25 RX ADMIN — POTASSIUM CHLORIDE AND SODIUM CHLORIDE 100 ML/HR: 900; 300 INJECTION, SOLUTION INTRAVENOUS at 00:06

## 2022-11-25 RX ADMIN — THIAMINE HCL TAB 100 MG 100 MG: 100 TAB at 10:26

## 2022-11-25 RX ADMIN — BUSPIRONE HYDROCHLORIDE 5 MG: 5 TABLET ORAL at 17:32

## 2022-11-25 RX ADMIN — SALINE NASAL SPRAY 1 SPRAY: 1.5 SOLUTION NASAL at 10:27

## 2022-11-25 RX ADMIN — POTASSIUM CHLORIDE 40 MEQ: 1500 TABLET, EXTENDED RELEASE ORAL at 10:26

## 2022-11-25 NOTE — ASSESSMENT & PLAN NOTE
Per neuropsychology evaluation on 11/14, patient does NOT have capacity to make medical decisions   Patient also has some level of cognitive impairment   B12 borderline low -> s/p one time intramuscular B12 shot -> continue supplementation  Patient intermittently refusing medications per nursing

## 2022-11-25 NOTE — ASSESSMENT & PLAN NOTE
BMI of 28 22 currently in the setting of decreased appetite/oral intake and unintentional weight loss  Continue nutritional supplementation w/ meals

## 2022-11-25 NOTE — PROGRESS NOTES
[facility]  Progress Note - Mildred Carlson 1960, 58 y o  male MRN: [id number]  [facility] Encounter: [id number]  Primary Care Provider: Eddie Dobbins MD   Date and time admitted to hospital: 11/3/2022  3:28 PM      * SHIMON (generalized anxiety disorder)  Assessment & Plan  • Previous admission from the end of September until the end of October at [facility] in which he was hospitalized for over a month for severe anxiety and inability to care for self  • Per sister reportedly was able to ambulate prior to the hospitalization but after the hospitalization patient needing assistance with ambulation at home  • During hospitalization there patient developed hyponatremia during his course of stay and Zoloft that had been started was stopped due to hyponatremia  • Recently discharged from inpatient psychiatry with mirtazapine and aripiprazole, discussed resumption of thesewith the patient but he reports excess sedation with these  • D/w psychiatry earlier this week started buspar 5mg BID, and lexapro  • He does admit to depressive symptoms and anxiety, discussing certain topics with him result is worsening tremor  • Monitor response to antidepressants  • Reconsulted psychiatry for re-evaluation / possible IP psych admission for [facility] psychiatry on 11/18 recommended inpatient psychiatry admission, in-person evaluation with our on-site psychiatrist 11/21 did not feel this was necessary  Recommended increasing escitalopram, making lorazepam scheduled as patient does not ask for it but his anxiety might benefit from it, and continuing BusPar  Overall plan is to help patient improve his anxiety so that he can get to rehab    • Per updated discussion w/ psychiatry -> continue current Lexapro/Buspar/Ativan regimen -> okay for discharge to skilled rehab from a psychiatric point of view -> awaiting placement, however, patient likely undergoing "OPTIONS" process prior to skilled rehab placement due to underlying psychiatric history             Cognitive impairment  Assessment & Plan  · Per neuropsychology evaluation on 11/14, patient does NOT have capacity to make medical decisions   · Patient also has some level of cognitive impairment   · B12 borderline low -> s/p one time intramuscular B12 shot -> continue supplementation  · Patient intermittently refusing medications per nursing     Hyperthyroidism  Assessment & Plan  · Per chart review patient has had low TSH and elevated free T4 in the past   · Most recent labs during this admission with TSH 0 282 and free t4 1 56  · Thyroid U/S during last admission at [address] [place] [address] was unremarkable   · Evaluated by endocrinology in the past during previous admission at [address] [place] [address] (oct 22) and they recommend get repeat TFT, TSI and thyroid abs   · Appreciate endocrinology evaluation this hospital course -> likely subclinical hyperthyroidism -> repeat thyroid testing in 4-6 weeks once acute medical issues stabilized    Severe protein-calorie malnutrition   Assessment & Plan  · BMI of 29 10 in the setting of decreased appetite/oral intake and unintentional weight loss  · Continue nutritional supplementation w/ meals     Hypernatremia  Assessment & Plan  · On presentation: sodium 151 -> progressively improved with normalization today  · Appreciate nephrology input  · Encourage increased oral intake  · Continue serum sodium monitoring    Diarrhea  Assessment & Plan  • Medications reviewed and ordered Senokot -> now discontinued  • PRN Imodium on board  • Continue IV fluid hydration and monitor/replete electrolyte deficiencies if present    Hypokalemia  Assessment & Plan  • Monitor/replete serum potassium and magnesium as necessary     History of CVA (cerebral vascular accident)   Assessment & Plan  · H/o CVA x 3 in setting of antiphospholipid syndrome  · On Coumadin for anticoagulation  · CT head in ER: No acute intracranial abnormality   Encephalomalacia involving the right occipital lobe and bilateral cerebelli   Grossly stable appearance of periventricular hypoattenuation compared to MRI brain 2022, likely representing chronic microvascular ischemic changes  · Appreciate PT/OT input -> awaiting skilled rehab placement     Tremor of right hand  Assessment & Plan  • Outpatient follow-up w/ neurology - consider sequela of prior strokes ? ?     Antiphospholipid antibody with hypercoagulable state  Assessment & Plan  · Continue Coumadin regimen - had ischemic stroke in the past despite Eliquis, hence, deemed Eliquis failure  · Due to subtherapeutic INR, Coumadin being bridged w/ therapeutic Lovenox currently     Atrial fibrillation   Assessment & Plan  · Rate controlled on Toprol-XL  · Continue Coumadin for anticoagulation (w/ therapeutic Lovenox bridge until INR at goal)        DVT Prophylaxis:  therapeutic Lovenox - Coumadin bridge    Patient Centered Rounds:  I have performed bedside rounds and discussed plan of care with nursing today  Discussions with Specialists or Other Care Team Provider:  see above assessments if applicable    Education and Discussions with Family / Patient: Patient at bedside - sister aware of pending placement    Time Spent for Care:  32 minutes  More than 50% of total time spent on counseling and coordination of care as described above  Current Length of Stay: 22 day(s)  Current Patient Status: Inpatient   Certification Statement:  Patient will continue to require additional hospital stay due to assessments as noted above  Code Status: Level 3 - DNAR and DNI        Subjective:     Seen/examined earlier today  Has a relatively flat affect  Remains weak/fatigued  Per nursing, he is quite minimally talkative throughout the day          Objective:     Vitals:   Temp (24hrs), Av 3 °F (36 3 °C), Min:97 2 °F (36 2 °C), Max:97 7 °F (36 5 °C)    Temp:  [97 2 °F (36 2 °C)-97 7 °F (36 5 °C)] 97 2 °F (36 2 °C)  HR:  [] 94  Resp:  [16] 16  BP: (158-163)/() 163/99  SpO2:  [93 %-98 %] 93 %  Body mass index is 28 22 kg/m²  Input and Output Summary (last 24 hours): Intake/Output Summary (Last 24 hours) at 11/25/2022 1449  Last data filed at 11/25/2022 0006  Gross per 24 hour   Intake 1108 33 ml   Output -20 ml   Net 1128 33 ml       Physical Exam:     GENERAL:  Remains weak/fatigued  HEAD:  Normocephalic - atraumatic  EYES: PERRL - EOMI   MOUTH:  Mucosa moist  NECK:  Supple - full range of motion  CARDIAC:  Rate controlled - S1/S2 positive  PULMONARY:  Fairly clear to auscultation - nonlabored respirations at rest  ABDOMEN:  Soft - nontender/nondistended - active bowel sounds  MUSCULOSKELETAL:  Motor strength/range of motion deconditioned  NEUROLOGIC:  Alert/oriented at baseline - minimally talkative  SKIN:  Chronic wrinkles/blemishes   PSYCHIATRIC:  Mood/affect remains flat      Additional Data:     Labs & Recent Cultures:    Results from last 7 days   Lab Units 11/25/22  0504   WBC Thousand/uL 5 91   HEMOGLOBIN g/dL 11 1*   HEMATOCRIT % 34 2*   PLATELETS Thousands/uL 158   NEUTROS PCT % 82*   LYMPHS PCT % 11*   MONOS PCT % 6   EOS PCT % 1     Results from last 7 days   Lab Units 11/25/22  0504   POTASSIUM mmol/L 3 2*   CHLORIDE mmol/L 118*   CO2 mmol/L 24   BUN mg/dL 13   CREATININE mg/dL 0 47*   CALCIUM mg/dL 8 0*     Results from last 7 days   Lab Units 11/25/22  0504   INR  1 54*             Results from last 7 days   Lab Units 11/23/22  1107   PROCALCITONIN ng/ml 0 11         Results from last 7 days   Lab Units 11/23/22  1122 11/23/22  1106   BLOOD CULTURE  No Growth at 24 hrs  No Growth at 48 hrs           Lines/Drains:  Invasive Devices     Peripheral Intravenous Line  Duration           Peripheral IV 11/23/22 Right;Ventral (anterior) Forearm 1 day          Drain  Duration           Rectal Tube With balloon 1 day                  Last 24 Hours Medication List:   Current Facility-Administered Medications Medication Dose Route Frequency Provider Last Rate   • busPIRone  5 mg Oral BID Hussein Magallanes MD     • vitamin B-12  500 mcg Oral Daily Jasmit Kaylie, DO     • cyanocobalamin  1,000 mcg Intramuscular Q30 Days Jasmit Kaylie, DO     • enoxaparin  1 mg/kg Subcutaneous Q12H Katerine Lopez MD     • escitalopram  10 mg Oral Daily Salud Salter MD     • fluticasone  1 spray Each Nare Daily Jasmit Kaylie, DO     • folic acid  1 mg Oral Daily Hussein Magallanes MD     • loperamide  2 mg Oral TID PRN Linda Smith, DO     • LORazepam  0 5 mg Oral BID Ba Martínez MD     • metoprolol  5 mg Intravenous Q6H PRN Davian Plaza MD     • metoprolol succinate  100 mg Oral Daily Ba Martínez MD     • multivitamin stress formula  1 tablet Oral Daily Hussein Magallanes MD     • pantoprazole  40 mg Oral Early Morning Jasmit Kaylie, DO     • potassium chloride  40 mEq Oral BID Ba Martínez MD     • sodium chloride  1 spray Each Nare Q1H PRN Ba Martínez MD     • sodium chloride 0 9 % with KCl 40 mEq/L  100 mL/hr Intravenous Continuous Davian Plaza  mL/hr (11/25/22 0006)   • thiamine  100 mg Oral Daily Hussein Magallanes MD     • warfarin  5 mg Oral QAM Ba Martínez MD                        ** Please Note: This note is constructed using a voice recognition dictation system  An occasional wrong word/phrase or “sound-a-like” substitution may have been picked up by dictation device due to the inherent limitations of voice recognition software  Read the chart carefully and recognize, using reasonable context, where substitutions may have occurred  **

## 2022-11-25 NOTE — PHYSICAL THERAPY NOTE
PHYSICAL THERAPY NOTE          Patient Name: Gage Cervantes  BKCZM'V Date: 11/25/2022 11/25/22 1356   PT Last Visit   PT Visit Date 11/25/22   Note Type   Note Type Treatment for insurance authorization   Pain Assessment   Pain Assessment Tool FLACC   Pain Location/Orientation Location: [address] [place] [address] Pain Intervention(s) Repositioned; Ambulation/increased activity; Emotional support   Pain Rating: FLACC (Rest) - Face 0   Pain Rating: FLACC (Rest) - Legs 0   Pain Rating: FLACC (Rest) - Activity 0   Pain Rating: FLACC (Rest) - Cry 0   Pain Rating: FLACC (Rest) - Consolability 0   Score: FLACC (Rest) 0   Pain Rating: FLACC (Activity) - Face 1   Pain Rating: FLACC (Activity) - Legs 0   Pain Rating: FLACC (Activity) - Activity 1   Pain Rating: FLACC (Activity) - Cry 0   Pain Rating: FLACC (Activity) - Consolability 0   Score: FLACC (Activity) 2   Restrictions/Precautions   Weight Bearing Precautions Per Order No   Other Precautions Cognitive; Chair Alarm; Bed Alarm;Multiple lines; Fall Risk;Pain;Visual impairment  (L sided UE tremor, rectal tube)   General   Chart Reviewed Yes   Response to Previous Treatment Patient unable to report, no changes reported from family or staff   Family/Caregiver Present No   Cognition   Overall Cognitive Status Impaired   Arousal/Participation Alert   Attention Difficulty attending to directions   Orientation Level Oriented to person;Oriented to place; Disoriented to time;Disoriented to situation   Memory Unable to assess   Following Commands Follows one step commands with increased time or repetition   Comments pt with eyes open, flat affect- minimally verbal   Subjective   Subjective "my legs are stuck"; "no"   Bed Mobility   Rolling R 3  Moderate assistance   Additional items Assist x 2; Increased time required;Verbal cues;LE management   Rolling L 3  Moderate assistance   Additional items Assist x 2;Increased time required;Verbal cues   Supine to Sit Unable to assess   Sit to Supine Unable to assess   Additional Comments pt supine in bed upon arrival  pt repositioned comfortably in supine with all needs wiin reach- bed alarm on  (pt adamently declining sitting EOB despite encouragement)   Transfers   Sit to Stand Unable to assess   Stand to Sit Unable to assess   Activity Tolerance   Activity Tolerance Patient limited by fatigue;Treatment limited secondary to medical complications (Comment)  (cognition; co-session with OT 2* icnreased medical complexity and multiple co-morbidities)   Nurse Made Aware RN cleared pt to participate in therapy session  Assessment   Prognosis Guarded   Problem List Decreased strength;Decreased range of motion;Decreased endurance; Impaired balance;Decreased mobility; Decreased coordination;Decreased cognition;Decreased safety awareness;Pain;Orthopedic restrictions   Assessment Pt seen for PT treatment session this date  Therapy session focused on bed mobility, repositioning in order to improve overall mobility and independence  Pt requires assist of 2 for all mobility performed this date  Pt making slow progress toward goals 2* cognitive deficits  Pt declines participation initially however agreeable to rolling R/L in bed to adjust wedges/ change soiled blue pads  Pt repositioned comfortably in bed to offload buttocks  Pt reporting that he cannot get OOB 2* "my legs are stuck"  Pt was left supine in bed at the end of PT session with all needs in reach  Pt would benefit from continued PT services while in hospital to address remaining limitations  PT to continue to folloow pt and recommends post-acute rehab services upon d/c  The patient's AM-PAC Basic Mobility Inpatient Short Form Raw Score is 6  A Raw score of less than or equal to 16 suggests the patient may benefit from discharge to post-acute rehabilitation services   Please also refer to the recommendation of the Physical Therapist for safe discharge planning  Barriers to Discharge Inaccessible home environment;Decreased caregiver support   Goals   Patient Goals none stated   STG Expiration Date 12/09/22  (extend POC, goals remain appropriate)   PT Treatment Day 8   Plan   Treatment/Interventions ADL retraining;Cognitive reorientation;Patient/family training;Bed mobility;Spoke to nursing;Spoke to case management;OT   Progress Slow progress, cognitive deficits   PT Frequency 2-3x/wk   Recommendation   PT Discharge Recommendation Post acute rehabilitation services   AM-PAC Basic Mobility Inpatient   Turning in Bed Without Bedrails 1   Lying on Back to Sitting on Edge of Flat Bed 1   Moving Bed to Chair 1   Standing Up From Chair 1   Walk in Room 1   Climb 3-5 Stairs 1   Basic Mobility Inpatient Raw Score 6   Highest Level Of Mobility   JH-HLM Goal 2: Bed activities/Dependent transfer   -HLM Achieved 2: Bed activities/Dependent transfer   Education   Education Provided Mobility training   Patient Reinforcement needed   End of Consult   Patient Position at End of Consult All needs within reach; Supine;Bed/Chair alarm activated     Lin Vital, PT, DPT

## 2022-11-25 NOTE — ASSESSMENT & PLAN NOTE
Rate controlled on Toprol-XL  Continue Coumadin for anticoagulation (w/ therapeutic Lovenox bridge until INR at goal)

## 2022-11-25 NOTE — ASSESSMENT & PLAN NOTE
Previous admission from the end of September until the end of October at [facility] in which he was hospitalized for over a month for severe anxiety and inability to care for self  Per sister reportedly was able to ambulate prior to the hospitalization but after the hospitalization patient needing assistance with ambulation at home  During hospitalization there patient developed hyponatremia during his course of stay and Zoloft that had been started was stopped due to hyponatremia  Recently discharged from inpatient psychiatry with mirtazapine and aripiprazole, discussed resumption of thesewith the patient but he reports excess sedation with these   D/w psychiatry earlier this week started buspar 5mg BID, and lexapro  He does admit to depressive symptoms and anxiety, discussing certain topics with him result is worsening tremor  Monitor response to antidepressants  Reconsulted psychiatry for re-evaluation / possible IP psych admission for [address] [place] [address] on 11/18 recommended inpatient psychiatry admission, in-person evaluation with our on-site psychiatrist 11/21 did not feel this was necessary  Recommended increasing escitalopram, making lorazepam scheduled as patient does not ask for it but his anxiety might benefit from it, and continuing BusPar  Overall plan is to help patient improve his anxiety so that he can get to rehab    Per updated discussion w/ psychiatry -> continue current Lexapro/Buspar/Ativan regimen -> okay for discharge to skilled rehab from a psychiatric point of view -> awaiting placement, however, patient likely undergoing "OPTIONS" process prior to skilled rehab placement due to underlying psychiatric history

## 2022-11-25 NOTE — ASSESSMENT & PLAN NOTE
Per chart review patient has had low TSH and elevated free T4 in the past   Most recent labs during this admission with TSH 0 282 and free t4 1 56  Thyroid U/S during last admission at [address] [place] [address] was unremarkable   Evaluated by endocrinology in the past during previous admission at [address] [place] [address] (Oct 22) and they recommend get repeat TFT, TSI and thyroid abs   Appreciate endocrinology evaluation this hospital course -> likely subclinical hyperthyroidism -> repeat thyroid testing in 4-6 weeks once acute medical issues stabilized

## 2022-11-25 NOTE — ASSESSMENT & PLAN NOTE
Continue Coumadin regimen - had ischemic stroke in the past despite Eliquis, hence, deemed Eliquis failure  Due to subtherapeutic INR, Coumadin being bridged w/ therapeutic Lovenox currently

## 2022-11-25 NOTE — PLAN OF CARE
Problem: OCCUPATIONAL THERAPY ADULT  Goal: Performs self-care activities at highest level of function for planned discharge setting  See evaluation for individualized goals  Description: Treatment Interventions: ADL retraining, Functional transfer training, UE strengthening/ROM, Endurance training, Patient/family training, Cognitive reorientation, Equipment evaluation/education, Compensatory technique education, Continued evaluation, Energy conservation, Activityengagement          See flowsheet documentation for full assessment, interventions and recommendations  Note: Limitation: Decreased ADL status, Decreased endurance, Decreased cognition, Decreased self-care trans, Decreased high-level ADLs  Prognosis: Fair  Assessment: Patient participated in Skilled OT session this date with interventions consisting of ADL re-training, bed mobility, RUE motor coordination  Patient agreeable to OT treatment session, upon arrival patient was found supine in bed  Pt minimally communicative, did respond no to some questions & was able to state name and location  Pt responding "no" repeatedly when asked about sitting EOB or OOB activity  Pt with improvements in self-feeding with R hand - able to dawson ~25% of food with fork; able to bring food to mouth  Pt able to bring cup with lid/straw to mouth  Pt able to bring wash cloth to face & wash near mouth, assist for thoroughness  Pt with wet linens requiring MAX A for hygiene in sidelying  Patient continues to be functioning below baseline level, occupational performance remains limited secondary to factors listed above and increased risk for falls and injury  From OT standpoint, recommendation at time of d/c would be POST-ACUTE [facility]  Patient to benefit from continued Occupational Therapy treatment while in the hospital to address deficits as defined above and maximize level of functional independence with ADLs and functional mobility       OT Discharge Recommendation: Post acute rehabilitation services

## 2022-11-25 NOTE — ASSESSMENT & PLAN NOTE
H/o CVA x 3 in setting of antiphospholipid syndrome  On Coumadin for anticoagulation  CT head in ER: No acute intracranial abnormality   Encephalomalacia involving the right occipital lobe and bilateral cerebelli   Grossly stable appearance of periventricular hypoattenuation compared to MRI brain 8/29/2022, likely representing chronic microvascular ischemic changes    Appreciate PT/OT input -> awaiting skilled rehab placement

## 2022-11-25 NOTE — ASSESSMENT & PLAN NOTE
Medications reviewed -> Senokot discontinued  PRN Imodium on board  Continue IV fluid hydration monitor/replete electrolyte deficiencies if present

## 2022-11-25 NOTE — OCCUPATIONAL THERAPY NOTE
Occupational Therapy Progress Note     Patient Name: Venice Easley  ASVTQ'B Date: 11/25/2022  Problem List  Principal Problem:    SHIMON (generalized anxiety disorder)  Active Problems:    Atrial fibrillation (HCC)    Antiphospholipid antibody with hypercoagulable state ([facility] )    Tremor of right hand    CVA (cerebral vascular accident) ([facility] )    Hypokalemia    Hypernatremia    Severe protein-calorie malnutrition ([facility] )    Hyperthyroidism    Impaired decision making    Diarrhea          11/25/22 1355   OT Last Visit   OT Visit Date 11/25/22   Note Type   Note Type Treatment for insurance authorization   Pain Assessment   Pain Assessment Tool FLACC   Pain Location/Orientation Location: [facility]   Hospital Pain Intervention(s) Repositioned; Emotional support   Pain Rating: FLACC (Rest) - Face 0   Pain Rating: FLACC (Rest) - Legs 0   Pain Rating: FLACC (Rest) - Activity 0   Pain Rating: FLACC (Rest) - Cry 0   Pain Rating: FLACC (Rest) - Consolability 0   Score: FLACC (Rest) 0   Pain Rating: FLACC (Activity) - Face 1   Pain Rating: FLACC (Activity) - Legs 0   Pain Rating: FLACC (Activity) - Activity 1   Pain Rating: FLACC (Activity) - Cry 0   Pain Rating: FLACC (Activity) - Consolability 0   Score: FLACC (Activity) 2   Restrictions/Precautions   Weight Bearing Precautions Per Order No   Other Precautions Cognitive; Bed Alarm; Chair Alarm; Fall Risk;Multiple lines;Pain  (L sided tremors, rectal tube)   ADL   Where Assessed Supine, bed  (with HOB elevated)   Eating Assistance 3  Moderate Assistance   Eating Deficit Setup; Increased time to complete   Eating Comments able to grasp fork with right hand and dawson ~25% of food, able to bring fork to mouth IND w/ increased time  Pt to bring cup with lid/straw to mouth   Grooming Assistance 4  Minimal Assistance  (face washing only)   Grooming Deficit   (washed face - needed assist for thoroughness)   UB Dressing Assistance 2  Maximal Assistance   UB Dressing Deficit Setup; Increased time to complete; Thread RUE; Thread LUE;Pull around back   UB Dressing Comments hospital gown   LB Dressing Assistance 1  Total Assistance   LB Dressing Deficit Don/doff R sock; Don/doff L sock   Toileting Assistance  1  Total Assistance   Toileting Deficit Perineal hygiene   Bed Mobility   Rolling R 3  Moderate assistance   Additional items Assist x 2; Increased time required;Verbal cues;LE management   Rolling L 3  Moderate assistance   Additional items Assist x 2; Increased time required;Verbal cues;LE management   Cognition   Overall Cognitive Status Impaired   Arousal/Participation Alert   Attention Attends with cues to redirect   Orientation Level Oriented to person;Oriented to place; Disoriented to time;Disoriented to situation   Following Commands Follows one step commands with increased time or repetition   Comments Flat affect, minimally verbal    Activity Tolerance   Activity Tolerance Patient limited by fatigue   Medical Staff Made Aware Seen with PT for co-treat for part of session 2' medical complexity, requiring Ax2 for bed mobility  Per RN pt appropriate to be seen   Assessment   Assessment Patient participated in Skilled OT session this date with interventions consisting of ADL re-training, bed mobility, RUE motor coordination  Patient agreeable to OT treatment session, upon arrival patient was found supine in bed  Pt minimally communicative, did respond no to some questions & was able to state name and location  Pt responding "no" repeatedly when asked about sitting EOB or OOB activity  Pt with improvements in self-feeding with R hand - able to dawson ~25% of food with fork; able to bring food to mouth  Pt able to bring cup with lid/straw to mouth  Pt able to bring wash cloth to face & wash near mouth, assist for thoroughness  Pt with wet linens requiring MAX A for hygiene in sidelying   Patient continues to be functioning below baseline level, occupational performance remains limited secondary to factors listed above and increased risk for falls and injury  From OT standpoint, recommendation at time of d/c would be POST-ACUTE [facility] SERVICES  Patient to benefit from continued Occupational Therapy treatment while in the hospital to address deficits as defined above and maximize level of functional independence with ADLs and functional mobility  Plan   Treatment Interventions ADL retraining;Functional transfer training;Patient/family training;Fine motor coordination activities; Neuromuscular reeducation;UE strengthening/ROM; Cognitive reorientation   Goal Expiration Date 12/21/22   OT Treatment Day 7   OT Frequency 2-3x/wk   Recommendation   OT Discharge Recommendation Post acute rehabilitation services   AM-PAC Daily Activity Inpatient   Lower Body Dressing 1   Bathing 2   Toileting 1   Upper Body Dressing 2   Grooming 2   Eating 2   Daily Activity Raw Score 10   AM-PAC Applied Cognition Inpatient   Following a Speech/Presentation 1   Understanding Ordinary Conversation 2   Taking Medications 2   Remembering Where Things Are Placed or Put Away 2   Remembering List of 4-5 Errands 2   Taking Care of Complicated Tasks 1   Applied Cognition Raw Score 10   Applied Cognition Standardized Score 24 98   End of Consult   Patient Position at End of Consult Supine;Bed/Chair alarm activated; All needs within reach   Nurse Communication Nurse aware of consult          The patient's raw score on the AM-PAC Daily Activity inpatient short form is 10, standardized score is 29 04, less than 39 4  Patients at this level are likely to benefit from discharge to post-acute rehabilitation services  Please refer to the recommendation of the Occupational Therapist for safe discharge planning            Yong Olsen

## 2022-11-25 NOTE — PLAN OF CARE
Problem: PHYSICAL THERAPY ADULT  Goal: Performs mobility at highest level of function for planned discharge setting  See evaluation for individualized goals  Description: Treatment/Interventions: LE strengthening/ROM, Functional transfer training, Endurance training, Patient/family training, Bed mobility, Cognitive reorientation, Therapeutic exercise          See flowsheet documentation for full assessment, interventions and recommendations  Note: Prognosis: Guarded  Problem List: Decreased strength, Decreased range of motion, Decreased endurance, Impaired balance, Decreased mobility, Decreased coordination, Decreased cognition, Decreased safety awareness, Pain, Orthopedic restrictions  Assessment:  Pt seen for PT treatment session this date  Therapy session focused on bed mobility, repositioning in order to improve overall mobility and independence  Pt requires assist of 2 for all mobility performed this date  Pt making slow progress toward goals 2* cognitive deficits  Pt declines participation initially however agreeable to rolling R/L in bed to adjust wedges/ change soiled blue pads  Pt repositioned comfortably in bed to offload buttocks  Pt reporting that he cannot get OOB 2* "my legs are stuck"  Pt was left supine in bed at the end of PT session with all needs in reach  Pt would benefit from continued PT services while in hospital to address remaining limitations  PT to continue to folloow pt and recommends post-acute rehab services upon d/c  The patient's AM-PAC Basic Mobility Inpatient Short Form Raw Score is 6  A Raw score of less than or equal to 16 suggests the patient may benefit from discharge to post-acute rehabilitation services  Please also refer to the recommendation of the Physical Therapist for safe discharge planning    Barriers to Discharge: Inaccessible home environment, Decreased caregiver support     PT Discharge Recommendation: Post acute rehabilitation services    See flowsheet documentation for full assessment

## 2022-11-25 NOTE — CASE MANAGEMENT
Case Management Discharge Planning Note    Patient name Mateo Burgos  Location [address] [place] [address] [facility] [id number]  : 1960 Date 2022       Current Admission Date: 11/3/2022  Current Admission Diagnosis:SHIMON (generalized anxiety disorder)   Patient Active Problem List    Diagnosis Date Noted   • Diarrhea 2022   • Impaired decision making 2022   • Hyperthyroidism 11/10/2022   • Severe protein-calorie malnutrition ([facility] ) 2022   • Hypernatremia 2022   • Hyponatremia 10/14/2022   • SIADH (syndrome of inappropriate ADH production) ([facility] ) 10/14/2022   • Subclinical hyperthyroidism 10/14/2022   • Sinus arrhythmia 10/10/2022   • Mild protein-calorie malnutrition ([email or web address] ) 10/07/2022   • Medical clearance for incarceration 2022   • 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 attack 2022   • Atypical chest pain 2022   • Hypokalemia 2022   • Dizziness 2022   • Renal cyst 2022   • PVD (peripheral vascular disease) ([facility] ) 2022   • Ataxia 2022   • CVA (cerebral vascular accident) ([email or web address] ) 2022   • Right inguinal pain 2022   • Alkaline phosphatase elevation 2022   • Low back pain 2022   • S/P laparoscopic hernia repair 2021   • Non-recurrent bilateral inguinal hernia without obstruction or gangrene 2021   • Gastric polyps 2021   • Gastric AVM 2021   • Edema of both lower legs 2021   • Tremor of right hand 2021   • Weakness of both lower extremities 2021   • Anticoagulated on Coumadin 2021   • CORIE (obstructive sleep apnea)    • Hyperbilirubinemia 08/10/2020   • Antiphospholipid antibody with hypercoagulable state ([facility] ) 2020   • History of stroke 2020   • Other viral warts 2019   • Physical deconditioning 2019   • Class 2 obesity in adult 2018   • Bright red blood per rectum 11/10/2017   • Numbness 2017   • H/O aortic aneurysm repair 08/26/2017   • Hypertension 08/26/2017   • GERD (gastroesophageal reflux disease) 08/26/2017   • Hyperlipidemia 03/17/2017   • Peyronie disease 12/09/2016   • Vitamin D deficiency 06/15/2016   • Atrial fibrillation ([address] ) 11/17/2014   • SHIMON (generalized anxiety disorder) 11/11/2014   • Constipation 09/19/2014   • Irritable bowel syndrome 04/24/2014   • Kidney stones 04/24/2014      LOS (days): 22  Geometric Mean LOS (GMLOS) (days): 5 00  Days to GMLOS:-16 5     OBJECTIVE:  Risk of Unplanned Readmission Score: 43 84         Current admission status: Inpatient   Preferred Pharmacy:   [address] [place] [address] [address] [place] [address]  Phone: [phone] Fax: [phone]    Primary Care Provider: Jatin Gutierrez MD    Primary Insurance: [facility] [address]  Secondary Insurance:     DISCHARGE DETAILS:                      Additional Comments: Message received this morning from [facility] stating that they need updated PASSR and updated clinicals before they can provide determination  This information has been updated and emailed over to [facility] to [email or web address] and [email or web address]   CM continues to await determination letter at this time in order for the patient to d/c to a facility  At this time optioning paperwork has been sent to the Critical access hospital on 11/9/2022, 11/15/22, 11/16/2022 as well as today 11/25/2022

## 2022-11-25 NOTE — ASSESSMENT & PLAN NOTE
On presentation: sodium 151 -> progressively improved with normalization on 11/25  Appreciate nephrology input  Encourage increased oral intake  Continue serum sodium monitoring

## 2022-11-26 ENCOUNTER — APPOINTMENT (INPATIENT)
Dept: RADIOLOGY | Facility: HOSPITAL | Age: 62
End: 2022-11-26

## 2022-11-26 LAB
ANION GAP SERPL CALCULATED.3IONS-SCNC: 6 MMOL/L (ref 4–13)
BASOPHILS # BLD AUTO: 0.01 THOUSANDS/ÂΜL (ref 0–0.1)
BASOPHILS NFR BLD AUTO: 0 % (ref 0–1)
BUN SERPL-MCNC: 9 MG/DL (ref 5–25)
CALCIUM SERPL-MCNC: 8.1 MG/DL (ref 8.3–10.1)
CHLORIDE SERPL-SCNC: 117 MMOL/L (ref 96–108)
CO2 SERPL-SCNC: 22 MMOL/L (ref 21–32)
CREAT SERPL-MCNC: 0.4 MG/DL (ref 0.6–1.3)
EOSINOPHIL # BLD AUTO: 0.08 THOUSAND/ÂΜL (ref 0–0.61)
EOSINOPHIL NFR BLD AUTO: 2 % (ref 0–6)
ERYTHROCYTE [DISTWIDTH] IN BLOOD BY AUTOMATED COUNT: 17.2 % (ref 11.6–15.1)
GFR SERPL CREATININE-BSD FRML MDRD: 127 ML/MIN/1.73SQ M
GLUCOSE SERPL-MCNC: 105 MG/DL (ref 65–140)
HCT VFR BLD AUTO: 31.8 % (ref 36.5–49.3)
HGB BLD-MCNC: 10.3 G/DL (ref 12–17)
IMM GRANULOCYTES # BLD AUTO: 0.01 THOUSAND/UL (ref 0–0.2)
IMM GRANULOCYTES NFR BLD AUTO: 0 % (ref 0–2)
INR PPP: 1.65 (ref 0.84–1.19)
LYMPHOCYTES # BLD AUTO: 0.72 THOUSANDS/ÂΜL (ref 0.6–4.47)
LYMPHOCYTES NFR BLD AUTO: 14 % (ref 14–44)
MAGNESIUM SERPL-MCNC: 2 MG/DL (ref 1.6–2.6)
MCH RBC QN AUTO: 32.2 PG (ref 26.8–34.3)
MCHC RBC AUTO-ENTMCNC: 32.4 G/DL (ref 31.4–37.4)
MCV RBC AUTO: 99 FL (ref 82–98)
MONOCYTES # BLD AUTO: 0.33 THOUSAND/ÂΜL (ref 0.17–1.22)
MONOCYTES NFR BLD AUTO: 7 % (ref 4–12)
NEUTROPHILS # BLD AUTO: 3.87 THOUSANDS/ÂΜL (ref 1.85–7.62)
NEUTS SEG NFR BLD AUTO: 77 % (ref 43–75)
NRBC BLD AUTO-RTO: 0 /100 WBCS
PHOSPHATE SERPL-MCNC: 2.1 MG/DL (ref 2.3–4.1)
PLATELET # BLD AUTO: 136 THOUSANDS/UL (ref 149–390)
PMV BLD AUTO: 10.1 FL (ref 8.9–12.7)
POTASSIUM SERPL-SCNC: 3.5 MMOL/L (ref 3.5–5.3)
PROTHROMBIN TIME: 19.7 SECONDS (ref 11.6–14.5)
RBC # BLD AUTO: 3.2 MILLION/UL (ref 3.88–5.62)
SODIUM SERPL-SCNC: 145 MMOL/L (ref 135–147)
WBC # BLD AUTO: 5.02 THOUSAND/UL (ref 4.31–10.16)

## 2022-11-26 RX ADMIN — LORAZEPAM 0.5 MG: 0.5 TABLET ORAL at 09:16

## 2022-11-26 RX ADMIN — BUSPIRONE HYDROCHLORIDE 5 MG: 5 TABLET ORAL at 09:16

## 2022-11-26 RX ADMIN — B-COMPLEX W/ C & FOLIC ACID TAB 1 TABLET: TAB at 09:16

## 2022-11-26 RX ADMIN — THIAMINE HCL TAB 100 MG 100 MG: 100 TAB at 09:16

## 2022-11-26 RX ADMIN — WARFARIN SODIUM 5 MG: 5 TABLET ORAL at 09:16

## 2022-11-26 RX ADMIN — FOLIC ACID 1 MG: 1 TABLET ORAL at 09:16

## 2022-11-26 RX ADMIN — ENOXAPARIN SODIUM 90 MG: 100 INJECTION SUBCUTANEOUS at 21:32

## 2022-11-26 RX ADMIN — METOPROLOL SUCCINATE 100 MG: 100 TABLET, EXTENDED RELEASE ORAL at 09:16

## 2022-11-26 RX ADMIN — POTASSIUM CHLORIDE AND SODIUM CHLORIDE 100 ML/HR: 900; 300 INJECTION, SOLUTION INTRAVENOUS at 21:32

## 2022-11-26 RX ADMIN — POTASSIUM CHLORIDE AND SODIUM CHLORIDE 100 ML/HR: 900; 300 INJECTION, SOLUTION INTRAVENOUS at 05:16

## 2022-11-26 RX ADMIN — FLUTICASONE PROPIONATE 1 SPRAY: 50 SPRAY, METERED NASAL at 09:18

## 2022-11-26 RX ADMIN — SALINE NASAL SPRAY 1 SPRAY: 1.5 SOLUTION NASAL at 09:17

## 2022-11-26 RX ADMIN — BUSPIRONE HYDROCHLORIDE 5 MG: 5 TABLET ORAL at 18:12

## 2022-11-26 RX ADMIN — LORAZEPAM 0.5 MG: 0.5 TABLET ORAL at 18:11

## 2022-11-26 RX ADMIN — CYANOCOBALAMIN TAB 500 MCG 500 MCG: 500 TAB at 09:16

## 2022-11-26 RX ADMIN — ENOXAPARIN SODIUM 90 MG: 100 INJECTION SUBCUTANEOUS at 09:16

## 2022-11-26 RX ADMIN — POTASSIUM CHLORIDE 40 MEQ: 1500 TABLET, EXTENDED RELEASE ORAL at 09:16

## 2022-11-26 RX ADMIN — POTASSIUM CHLORIDE 40 MEQ: 1500 TABLET, EXTENDED RELEASE ORAL at 18:11

## 2022-11-26 RX ADMIN — ESCITALOPRAM OXALATE 10 MG: 10 TABLET ORAL at 09:16

## 2022-11-26 NOTE — PROGRESS NOTES
[facility]  Progress Note - Catalina Dumont 1960, 58 y o  male MRN: [id number]  [facility] Encounter: [id number]  Primary Care Provider: Dillon Pitts MD   Date and time admitted to hospital: 11/3/2022  3:28 PM      * SHIMON (generalized anxiety disorder)  Assessment & Plan  Previous admission from the end of September until the end of October at [facility] in which he was hospitalized for over a month for severe anxiety and inability to care for self  Per sister reportedly was able to ambulate prior to the hospitalization but after the hospitalization patient needing assistance with ambulation at home  During hospitalization there patient developed hyponatremia during his course of stay and Zoloft that had been started was stopped due to hyponatremia  Recently discharged from inpatient psychiatry with mirtazapine and aripiprazole, discussed resumption of thesewith the patient but he reports excess sedation with these   D/w psychiatry earlier this week started buspar 5mg BID, and lexapro  He does admit to depressive symptoms and anxiety, discussing certain topics with him result is worsening tremor  Monitor response to antidepressants  Reconsulted psychiatry for re-evaluation / possible IP psych admission for [facility] psychiatry on 11/18 recommended inpatient psychiatry admission, in-person evaluation with our on-site psychiatrist 11/21 did not feel this was necessary  Recommended increasing escitalopram, making lorazepam scheduled as patient does not ask for it but his anxiety might benefit from it, and continuing BusPar  Overall plan is to help patient improve his anxiety so that he can get to rehab    Per updated discussion w/ psychiatry -> continue current Lexapro/Buspar/Ativan regimen -> okay for discharge to skilled rehab from a psychiatric point of view -> awaiting placement, however, patient likely undergoing "OPTIONS" process prior to skilled rehab placement due to underlying psychiatric history    Cognitive impairment  Assessment & Plan  Per neuropsychology evaluation on 11/14, patient does NOT have capacity to make medical decisions   Patient also has some level of cognitive impairment   B12 borderline low -> s/p one time intramuscular B12 shot -> continue supplementation  Patient intermittently refusing medications per nursing    Hyperthyroidism  Assessment & Plan  Per chart review patient has had low TSH and elevated free T4 in the past   Most recent labs during this admission with TSH 0 282 and free t4 1 56  Thyroid U/S during last admission at [address] [place] [address] was unremarkable   Evaluated by endocrinology in the past during previous admission at [address] [place] [address] (Oct 22) and they recommend get repeat TFT, TSI and thyroid abs   Appreciate endocrinology evaluation this hospital course -> likely subclinical hyperthyroidism -> repeat thyroid testing in 4-6 weeks once acute medical issues stabilized    Severe protein-calorie malnutrition   Assessment & Plan  BMI of 28 22 currently in the setting of decreased appetite/oral intake and unintentional weight loss  Continue nutritional supplementation w/ meals    Hypernatremia  Assessment & Plan  On presentation: sodium 151 -> progressively improved with normalization on 11/25  Appreciate nephrology input  Encourage increased oral intake  Continue serum sodium monitoring    Diarrhea  Assessment & Plan  Medications reviewed -> Senokot discontinued  PRN Imodium on board  Continue IV fluid hydration monitor/replete electrolyte deficiencies if present    Hypokalemia  Assessment & Plan  Monitor/replete serum potassium and magnesium as necessary    History of CVA (cerebrovascular accident)  Assessment & Plan  H/o CVA x 3 in setting of antiphospholipid syndrome  On Coumadin for anticoagulation  CT head in ER: No acute intracranial abnormality   Encephalomalacia involving the right occipital lobe and bilateral cerebelli   Grossly stable appearance of periventricular hypoattenuation compared to MRI brain 2022, likely representing chronic microvascular ischemic changes  Appreciate PT/OT input -> awaiting skilled rehab placement    Tremor of right hand  Assessment & Plan  Outpatient follow-up w/ neurology - consider sequela of prior strokes ? ? Antiphospholipid antibody with hypercoagulable state   Assessment & Plan  Continue Coumadin regimen - had ischemic stroke in the past despite Eliquis, hence, deemed Eliquis failure  Due to subtherapeutic INR, Coumadin being bridged w/ therapeutic Lovenox currently    Atrial fibrillation  Assessment & Plan  Rate controlled on Toprol-XL  Continue Coumadin for anticoagulation (w/ therapeutic Lovenox bridge until INR at goal)      DVT Prophylaxis:  therapeutic Lovenox w/ Coumadin      Patient Centered Rounds:  I have performed bedside rounds and discussed plan of care with nursing today  Discussions with Specialists or Other Care Team Provider:  see above assessments if applicable    Education and Discussions with Family / Patient:  Patient at bedside - sister aware of pending placement    Time Spent for Care:  32 minutes  More than 50% of total time spent on counseling and coordination of care as described above  Current Length of Stay: 23 day(s)  Current Patient Status: Inpatient   Certification Statement:  Patient will continue to require additional hospital stay due to assessments as noted above  Code Status: Level 3 - DNAR and DNI        Subjective:     Remains weak/fatigued  Flat affect persists  Objective:     Vitals:   Temp (24hrs), Av 4 °F (36 3 °C), Min:97 2 °F (36 2 °C), Max:97 7 °F (36 5 °C)    Temp:  [97 2 °F (36 2 °C)-97 7 °F (36 5 °C)] 97 7 °F (36 5 °C)  HR:  [76-84] 76  Resp:  [16] 16  BP: (145-157)/(89-97) 145/89  SpO2:  [97 %-98 %] 98 %  Body mass index is 28 66 kg/m²  Input and Output Summary (last 24 hours):        Intake/Output Summary (Last 24 hours) at 2022 1303  Last data filed at 11/26/2022 0900  Gross per 24 hour   Intake 2360 ml   Output 100 ml   Net 2260 ml       Physical Exam:     GENERAL:  Weak/fatigued  HEAD:  Normocephalic - atraumatic  EYES: PERRL - EOMI   MOUTH:  Mucosa moist  NECK:  Supple - full range of motion  CARDIAC:  Rate controlled - S1/S2 positive  PULMONARY:  Clear breath sounds bilaterally - nonlabored respirations  ABDOMEN:  Soft - nontender/nondistended - active bowel sounds  MUSCULOSKELETAL:  Motor strength/range of motion deconditioned  NEUROLOGIC:  Awake but minimally talkative  SKIN:  Chronic wrinkles/blemishes   PSYCHIATRIC:  Mood/affect flat      Additional Data:     Labs & Recent Cultures:    Results from last 7 days   Lab Units 11/26/22  0511   WBC Thousand/uL 5 02   HEMOGLOBIN g/dL 10 3*   HEMATOCRIT % 31 8*   PLATELETS Thousands/uL 136*   NEUTROS PCT % 77*   LYMPHS PCT % 14   MONOS PCT % 7   EOS PCT % 2     Results from last 7 days   Lab Units 11/26/22  0511   POTASSIUM mmol/L 3 5   CHLORIDE mmol/L 117*   CO2 mmol/L 22   BUN mg/dL 9   CREATININE mg/dL 0 40*   CALCIUM mg/dL 8 1*     Results from last 7 days   Lab Units 11/26/22  0511   INR  1 65*             Results from last 7 days   Lab Units 11/23/22  1107   PROCALCITONIN ng/ml 0 11         Results from last 7 days   Lab Units 11/23/22  1122 11/23/22  1106   BLOOD CULTURE  No Growth at 48 hrs  No Growth at 72 hrs           Lines/Drains:  Invasive Devices     Peripheral Intravenous Line  Duration           Peripheral IV 11/23/22 Right;Ventral (anterior) Forearm 2 days          Drain  Duration           Rectal Tube With balloon 2 days                  Last 24 Hours Medication List:   Current Facility-Administered Medications   Medication Dose Route Frequency Provider Last Rate   • busPIRone  5 mg Oral BID Kathleen Pozo MD     • vitamin B-12  500 mcg Oral Daily Tana Lott DO     • cyanocobalamin  1,000 mcg Intramuscular Q30 Days Tana Lott DO     • enoxaparin  1 mg/kg Subcutaneous Q12H [address] Darrion Salmeron MD     • escitalopram  10 mg Oral Daily Raj Salter MD     • fluticasone  1 spray Each Nare Daily Tana Lott, DO     • folic acid  1 mg Oral Daily Darrion Salmeron MD     • loperamide  2 mg Oral TID PRN Avani Espinosa, DO     • LORazepam  0 5 mg Oral BID Favio Hughes MD     • metoprolol  5 mg Intravenous Q6H PRN Rajeev Gee MD     • metoprolol succinate  100 mg Oral Daily Favio Hughes MD     • multivitamin stress formula  1 tablet Oral Daily Darrion Salmeron MD     • pantoprazole  40 mg Oral Early Morning Brandoit Kaylie, DO     • potassium chloride  40 mEq Oral BID Favio Hughes MD     • sodium chloride  1 spray Each Nare Q1H PRN Favio Hughes MD     • sodium chloride 0 9 % with KCl 40 mEq/L  100 mL/hr Intravenous Continuous Rajeev Gee  mL/hr (11/26/22 0516)   • thiamine  100 mg Oral Daily Darrion Salmeron MD     • warfarin  5 mg Oral QAM Favio Hughes MD                    ** Please Note: This note is constructed using a voice recognition dictation system  An occasional wrong word/phrase or “sound-a-like” substitution may have been picked up by dictation device due to the inherent limitations of voice recognition software  Read the chart carefully and recognize, using reasonable context, where substitutions may have occurred  **

## 2022-11-27 PROBLEM — E87.8 ELECTROLYTE ABNORMALITY: Status: ACTIVE | Noted: 2022-01-01

## 2022-11-27 LAB
ANION GAP SERPL CALCULATED.3IONS-SCNC: 6 MMOL/L (ref 4–13)
BASOPHILS # BLD AUTO: 0.01 THOUSANDS/ÂΜL (ref 0–0.1)
BASOPHILS NFR BLD AUTO: 0 % (ref 0–1)
BUN SERPL-MCNC: 7 MG/DL (ref 5–25)
CALCIUM SERPL-MCNC: 8.3 MG/DL (ref 8.3–10.1)
CHLORIDE SERPL-SCNC: 113 MMOL/L (ref 96–108)
CO2 SERPL-SCNC: 23 MMOL/L (ref 21–32)
CREAT SERPL-MCNC: 0.42 MG/DL (ref 0.6–1.3)
EOSINOPHIL # BLD AUTO: 0.1 THOUSAND/ÂΜL (ref 0–0.61)
EOSINOPHIL NFR BLD AUTO: 2 % (ref 0–6)
ERYTHROCYTE [DISTWIDTH] IN BLOOD BY AUTOMATED COUNT: 17.2 % (ref 11.6–15.1)
GFR SERPL CREATININE-BSD FRML MDRD: 124 ML/MIN/1.73SQ M
GLUCOSE SERPL-MCNC: 96 MG/DL (ref 65–140)
HCT VFR BLD AUTO: 33.5 % (ref 36.5–49.3)
HGB BLD-MCNC: 10.7 G/DL (ref 12–17)
IMM GRANULOCYTES # BLD AUTO: 0.03 THOUSAND/UL (ref 0–0.2)
IMM GRANULOCYTES NFR BLD AUTO: 1 % (ref 0–2)
INR PPP: 1.63 (ref 0.84–1.19)
LYMPHOCYTES # BLD AUTO: 0.75 THOUSANDS/ÂΜL (ref 0.6–4.47)
LYMPHOCYTES NFR BLD AUTO: 14 % (ref 14–44)
MAGNESIUM SERPL-MCNC: 2 MG/DL (ref 1.6–2.6)
MCH RBC QN AUTO: 31.7 PG (ref 26.8–34.3)
MCHC RBC AUTO-ENTMCNC: 31.9 G/DL (ref 31.4–37.4)
MCV RBC AUTO: 99 FL (ref 82–98)
MONOCYTES # BLD AUTO: 0.35 THOUSAND/ÂΜL (ref 0.17–1.22)
MONOCYTES NFR BLD AUTO: 6 % (ref 4–12)
NEUTROPHILS # BLD AUTO: 4.24 THOUSANDS/ÂΜL (ref 1.85–7.62)
NEUTS SEG NFR BLD AUTO: 77 % (ref 43–75)
NRBC BLD AUTO-RTO: 0 /100 WBCS
PHOSPHATE SERPL-MCNC: 2.2 MG/DL (ref 2.3–4.1)
PLATELET # BLD AUTO: 154 THOUSANDS/UL (ref 149–390)
PMV BLD AUTO: 10.2 FL (ref 8.9–12.7)
POTASSIUM SERPL-SCNC: 4 MMOL/L (ref 3.5–5.3)
PROTHROMBIN TIME: 19.6 SECONDS (ref 11.6–14.5)
RBC # BLD AUTO: 3.38 MILLION/UL (ref 3.88–5.62)
SODIUM SERPL-SCNC: 142 MMOL/L (ref 135–147)
WBC # BLD AUTO: 5.48 THOUSAND/UL (ref 4.31–10.16)

## 2022-11-27 RX ADMIN — PANTOPRAZOLE SODIUM 40 MG: 40 TABLET, DELAYED RELEASE ORAL at 05:20

## 2022-11-27 RX ADMIN — ENOXAPARIN SODIUM 90 MG: 100 INJECTION SUBCUTANEOUS at 21:30

## 2022-11-27 RX ADMIN — LORAZEPAM 0.5 MG: 0.5 TABLET ORAL at 19:33

## 2022-11-27 RX ADMIN — POTASSIUM CHLORIDE AND SODIUM CHLORIDE 100 ML/HR: 900; 300 INJECTION, SOLUTION INTRAVENOUS at 16:26

## 2022-11-27 RX ADMIN — BUSPIRONE HYDROCHLORIDE 5 MG: 5 TABLET ORAL at 19:33

## 2022-11-27 RX ADMIN — METOROPROLOL TARTRATE 5 MG: 5 INJECTION, SOLUTION INTRAVENOUS at 06:31

## 2022-11-27 RX ADMIN — POTASSIUM CHLORIDE AND SODIUM CHLORIDE 100 ML/HR: 900; 300 INJECTION, SOLUTION INTRAVENOUS at 06:36

## 2022-11-27 RX ADMIN — SODIUM PHOSPHATE, MONOBASIC, MONOHYDRATE 30 MMOL: 276; 142 INJECTION, SOLUTION INTRAVENOUS at 12:43

## 2022-11-27 NOTE — PROGRESS NOTES
[address] [facility]  Progress Note - Ophelia Becker 1960, 58 y o  male MRN: [id number]  Unit/Bed#: [facility] 907-01 Encounter: [id number]  Primary Care Provider: Todd Weber MD   Date and time admitted to hospital: 11/3/2022  3:28 PM      * SHIMON (generalized anxiety disorder)  Assessment & Plan  Previous admission from the end of September until the end of October at [facility] in which he was hospitalized for over a month for severe anxiety and inability to care for self  Per sister reportedly was able to ambulate prior to the hospitalization but after the hospitalization patient needing assistance with ambulation at home  During hospitalization there patient developed hyponatremia during his course of stay and Zoloft that had been started was stopped due to hyponatremia  Recently discharged from inpatient psychiatry with mirtazapine and aripiprazole, discussed resumption of thesewith the patient but he reports excess sedation with these   D/w psychiatry earlier this week started buspar 5mg BID, and lexapro  He does admit to depressive symptoms and anxiety, discussing certain topics with him result is worsening tremor  Monitor response to antidepressants  Reconsulted psychiatry for re-evaluation / possible IP psych admission for [address] [place] [address] on 11/18 recommended inpatient psychiatry admission, in-person evaluation with our on-site psychiatrist 11/21 did not feel this was necessary  Recommended increasing escitalopram, making lorazepam scheduled as patient does not ask for it but his anxiety might benefit from it, and continuing BusPar  Overall plan is to help patient improve his anxiety so that he can get to rehab    Per updated discussion w/ psychiatry -> continue current Lexapro/Buspar/Ativan regimen -> okay for discharge to skilled rehab from a psychiatric point of view -> awaiting placement, however, patient likely undergoing "OPTIONS" process prior to skilled rehab placement due to underlying psychiatric history    Cognitive impairment  Assessment & Plan  Per neuropsychology evaluation on 11/14, patient does NOT have capacity to make medical decisions   Patient also has some level of cognitive impairment   B12 borderline low -> s/p one time intramuscular B12 shot -> continue supplementation  Patient intermittently refusing medications per nursing    Hyperthyroidism  Assessment & Plan  Per chart review patient has had low TSH and elevated free T4 in the past   Most recent labs during this admission with TSH 0 282 and free t4 1 56  Thyroid U/S during last admission at [facility] was unremarkable   Evaluated by endocrinology in the past during previous admission at [facility] (Oct 22) and they recommend get repeat TFT, TSI and thyroid abs   Appreciate endocrinology evaluation this hospital course -> likely subclinical hyperthyroidism -> repeat thyroid testing in 4-6 weeks once acute medical issues stabilized    Severe protein-calorie malnutrition   Assessment & Plan  BMI of 28 33 currently in the setting of decreased appetite/oral intake and unintentional weight loss  Continue nutritional supplementation w/ meals    Hypernatremia  Assessment & Plan  On presentation: sodium 151 -> progressively improved with normalization on 11/25  Appreciate nephrology input  Encourage increased oral intake  Continue serum sodium monitoring    Diarrhea  Assessment & Plan  Medications reviewed -> Senokot previously discontinued  Diarrhea has been waxing/waning but persistent over the last several days despite PRN Imodium ordered previously  Continue IV fluid hydration monitor/replete electrolyte deficiencies if present  Appreciate gastroenterology input -> proceeding w/ bacterial/viral/parasitic infectious workup -> hold Imodium until infectious workup negative    Multiple electrolyte abnormalities  Assessment & Plan  Monitor/replete serum phosphate/potassium/magnesium as necessary  Consider insensible losses in the setting of intractable diarrhea    History of CVA (cerebrovascular accident)  Assessment & Plan  H/o CVA x 3 in setting of antiphospholipid syndrome  On Coumadin for anticoagulation  CT head in ER: No acute intracranial abnormality   Encephalomalacia involving the right occipital lobe and bilateral cerebelli   Grossly stable appearance of periventricular hypoattenuation compared to MRI brain 8/29/2022, likely representing chronic microvascular ischemic changes  Appreciate PT/OT input -> awaiting skilled rehab placement    Tremor of right hand  Assessment & Plan  Outpatient follow-up w/ neurology - consider sequela of prior strokes ? ? Antiphospholipid antibody with hypercoagulable state   Assessment & Plan  Continue Coumadin regimen - had ischemic stroke in the past despite Eliquis, hence, deemed Eliquis failure  Due to subtherapeutic INR, Coumadin being bridged w/ therapeutic Lovenox currently    Atrial fibrillation  Assessment & Plan  Rate controlled on Toprol-XL  Continue Coumadin for anticoagulation (w/ therapeutic Lovenox bridge until INR at goal)      DVT Prophylaxis:  therapeutic Lovenox w/ Coumadin      Patient Centered Rounds:  I have performed bedside rounds and discussed plan of care with nursing today  Discussions with Specialists or Other Care Team Provider:  see above assessments if applicable    Education and Discussions with Family / Patient:  Patient at bedside - sister aware of pending placement    Time Spent for Care:  32 minutes  More than 50% of total time spent on counseling and coordination of care as described above  Current Length of Stay: 24 day(s)  Current Patient Status: Inpatient   Certification Statement:  Patient will continue to require additional hospital stay due to assessments as noted above  Code Status: Level 3 - DNAR and DNI        Subjective:     Remains with a relatively flat affect  Weakness/fatigue noted  Denies acute complaints otherwise          Objective: Vitals:   Temp (24hrs), Av 3 °F (36 3 °C), Min:96 6 °F (35 9 °C), Max:97 7 °F (36 5 °C)    Temp:  [96 6 °F (35 9 °C)-97 7 °F (36 5 °C)] 97 7 °F (36 5 °C)  HR:  [82-99] 99  Resp:  [18-19] 18  BP: (144-176)/() 149/96  SpO2:  [94 %-98 %] 98 %  Body mass index is 28 33 kg/m²  Input and Output Summary (last 24 hours): Intake/Output Summary (Last 24 hours) at 2022 1548  Last data filed at 2022 1227  Gross per 24 hour   Intake 1923 33 ml   Output 500 ml   Net 1423 33 ml       Physical Exam:     GENERAL:  Remains weak/fatigued  HEAD:  Normocephalic - atraumatic  EYES: PERRL - EOMI   MOUTH:  Mucosa moist  NECK:  Supple - full range of motion  CARDIAC:  Rate controlled - S1/S2 positive  PULMONARY:  Fairly clear to auscultation - nonlabored respirations at rest  ABDOMEN:  Soft - currently nontender/nondistended - active bowel sounds  MUSCULOSKELETAL:  Motor strength/range of motion deconditioned  NEUROLOGIC:  Awake but minimally talkative  SKIN:  Chronic wrinkles/blemishes   PSYCHIATRIC:  Mood/affect flat      Additional Data:     Labs & Recent Cultures:    Results from last 7 days   Lab Units 22  0505   WBC Thousand/uL 5 48   HEMOGLOBIN g/dL 10 7*   HEMATOCRIT % 33 5*   PLATELETS Thousands/uL 154   NEUTROS PCT % 77*   LYMPHS PCT % 14   MONOS PCT % 6   EOS PCT % 2     Results from last 7 days   Lab Units 22  0505   POTASSIUM mmol/L 4 0   CHLORIDE mmol/L 113*   CO2 mmol/L 23   BUN mg/dL 7   CREATININE mg/dL 0 42*   CALCIUM mg/dL 8 3     Results from last 7 days   Lab Units 22  0505   INR  1 63*             Results from last 7 days   Lab Units 22  1107   PROCALCITONIN ng/ml 0 11         Results from last 7 days   Lab Units 22  1122 22  1106   BLOOD CULTURE  No Growth at 72 hrs  No Growth After 4 Days           Lines/Drains:  Invasive Devices     Peripheral Intravenous Line  Duration           Peripheral IV 22 Left;Ventral (anterior) Wrist <1 day Drain  Duration           Rectal Tube With balloon 3 days                  Last 24 Hours Medication List:   Current Facility-Administered Medications   Medication Dose Route Frequency Provider Last Rate   • busPIRone  5 mg Oral BID Marito Foote MD     • vitamin B-12  500 mcg Oral Daily Jasmit Kaylie, DO     • cyanocobalamin  1,000 mcg Intramuscular Q30 Days Jasmit Kaylie, DO     • enoxaparin  1 mg/kg Subcutaneous Q12H [address] Marito Foote MD     • escitalopram  10 mg Oral Daily Greer Salter MD     • fluticasone  1 spray Each Nare Daily Jasmit Kaylie, DO     • folic acid  1 mg Oral Daily Marito Foote MD     • LORazepam  0 5 mg Oral BID Ton Gentile MD     • metoprolol  5 mg Intravenous Q6H PRN Lucien Sanders MD     • metoprolol succinate  100 mg Oral Daily Ton Gentile MD     • multivitamin stress formula  1 tablet Oral Daily Marito Foote MD     • pantoprazole  40 mg Oral Early Morning Brandoit Kaylie, DO     • potassium chloride  40 mEq Oral BID Ton Gentile MD     • sodium chloride  1 spray Each Nare Q1H PRN Ton Gentile MD     • sodium chloride 0 9 % with KCl 40 mEq/L  100 mL/hr Intravenous Continuous Lucien Sanders  mL/hr (11/27/22 0636)   • sodium phosphate  30 mmol Intravenous Once Lucien Sanders MD 30 mmol (11/27/22 1243)   • thiamine  100 mg Oral Daily Marito Foote MD     • warfarin  5 mg Oral QAM Ton Gentile MD                    ** Please Note: This note is constructed using a voice recognition dictation system  An occasional wrong word/phrase or “sound-a-like” substitution may have been picked up by dictation device due to the inherent limitations of voice recognition software  Read the chart carefully and recognize, using reasonable context, where substitutions may have occurred  **

## 2022-11-27 NOTE — ASSESSMENT & PLAN NOTE
BMI of 28 33 currently in the setting of decreased appetite/oral intake and unintentional weight loss  Continue nutritional supplementation w/ meals

## 2022-11-27 NOTE — ASSESSMENT & PLAN NOTE
H/o CVA x 3 in setting of antiphospholipid syndrome  On Coumadin for anticoagulation  CT head in ER: No acute intracranial abnormality   Encephalomalacia involving the right occipital lobe and bilateral cerebelli   Grossly stable appearance of periventricular hypoattenuation compared to MRI brain 8/29/2022, likely representing chronic microvascular ischemic changes    Appreciate PT/OT input ->  plan for skilled rehab placement once medically stabilized

## 2022-11-27 NOTE — ASSESSMENT & PLAN NOTE
Per chart review patient has had low TSH and elevated free T4 in the past   Most recent labs during this admission with TSH 0 282 and free t4 1 56  Thyroid U/S during last admission at [address] [place] [address] was unremarkable   Evaluated by endocrinology in the past during previous admission at [address] [place] [address] (Oct 22) and they recommend get repeat TFT, TSI and thyroid abs   Appreciate endocrinology evaluation this hospital course -> likely subclinical hyperthyroidism -> repeat thyroid testing in 4-6 weeks once acute medical issues stabilized

## 2022-11-27 NOTE — PLAN OF CARE
Problem: Potential for Falls  Goal: Patient will remain free of falls  Description: INTERVENTIONS:  - Educate patient/family on patient safety including physical limitations  - Instruct patient to call for assistance with activity   - Consult OT/PT to assist with strengthening/mobility   - Keep Call bell within reach  - Keep bed low and locked with side rails adjusted as appropriate  - Keep care items and personal belongings within reach  - Initiate and maintain comfort rounds  - Make Fall Risk Sign visible to staff  - Apply yellow socks and bracelet for high fall risk patients  - Consider moving patient to room near nurses station  Outcome: Progressing     Problem: PAIN - ADULT  Goal: Verbalizes/displays adequate comfort level or baseline comfort level  Description: Interventions:  - Encourage patient to monitor pain and request assistance  - Assess pain using appropriate pain scale  - Administer analgesics based on type and severity of pain and evaluate response  - Implement non-pharmacological measures as appropriate and evaluate response  - Consider cultural and social influences on pain and pain management  - Notify physician/advanced practitioner if interventions unsuccessful or patient reports new pain  Outcome: Progressing     Problem: SAFETY ADULT  Goal: Patient will remain free of falls  Description: INTERVENTIONS:  - Educate patient/family on patient safety including physical limitations  - Instruct patient to call for assistance with activity   - Consult OT/PT to assist with strengthening/mobility   - Keep Call bell within reach  - Keep bed low and locked with side rails adjusted as appropriate  - Keep care items and personal belongings within reach  - Initiate and maintain comfort rounds  - Make Fall Risk Sign visible to staff  - Apply yellow socks and bracelet for high fall risk patients  - Consider moving patient to room near nurses station  Outcome: Progressing  Goal: Maintain or return to baseline ADL function  Description: INTERVENTIONS:  -  Assess patient's ability to carry out ADLs; assess patient's baseline for ADL function and identify physical deficits which impact ability to perform ADLs (bathing, care of mouth/teeth, toileting, grooming, dressing, etc )  - Assess/evaluate cause of self-care deficits   - Assess range of motion  - Assess patient's mobility; develop plan if impaired  - Assess patient's need for assistive devices and provide as appropriate  - Encourage maximum independence but intervene and supervise when necessary  - Involve family in performance of ADLs  - Assess for home care needs following discharge   - Consider OT consult to assist with ADL evaluation and planning for discharge  - Provide patient education as appropriate  Outcome: Progressing  Goal: Maintains/Returns to pre admission functional level  Description: INTERVENTIONS:  - Perform BMAT or MOVE assessment daily    - Set and communicate daily mobility goal to care team and patient/family/caregiver     - Collaborate with rehabilitation services on mobility goals if consulted  - Out of bed for toileting  - Record patient progress and toleration of activity level   Outcome: Progressing     Problem: MOBILITY - ADULT  Goal: Maintain or return to baseline ADL function  Description: INTERVENTIONS:  -  Assess patient's ability to carry out ADLs; assess patient's baseline for ADL function and identify physical deficits which impact ability to perform ADLs (bathing, care of mouth/teeth, toileting, grooming, dressing, etc )  - Assess/evaluate cause of self-care deficits   - Assess range of motion  - Assess patient's mobility; develop plan if impaired  - Assess patient's need for assistive devices and provide as appropriate  - Encourage maximum independence but intervene and supervise when necessary  - Involve family in performance of ADLs  - Assess for home care needs following discharge   - Consider OT consult to assist with ADL evaluation and planning for discharge  - Provide patient education as appropriate  Outcome: Progressing  Goal: Maintains/Returns to pre admission functional level  Description: INTERVENTIONS:  - Perform BMAT or MOVE assessment daily    - Set and communicate daily mobility goal to care team and patient/family/caregiver  - Collaborate with rehabilitation services on mobility goals if consulted  - Out of bed for toileting  - Record patient progress and toleration of activity level   Outcome: Progressing     Problem: Prexisting or High Potential for Compromised Skin Integrity  Goal: Skin integrity is maintained or improved  Description: INTERVENTIONS:  - Identify patients at risk for skin breakdown  - Assess and monitor skin integrity  - Assess and monitor nutrition and hydration status  - Monitor labs   - Assess for incontinence   - Turn and reposition patient  - Assist with mobility/ambulation  - Relieve pressure over bony prominences  - Avoid friction and shearing  - Provide appropriate hygiene as needed including keeping skin clean and dry  - Evaluate need for skin moisturizer/barrier cream  - Collaborate with interdisciplinary team   - Patient/family teaching  - Consider wound care consult   Outcome: Progressing     Problem: Nutrition/Hydration-ADULT  Goal: Nutrient/Hydration intake appropriate for improving, restoring or maintaining nutritional needs  Description: Monitor and assess patient's nutrition/hydration status for malnutrition  Collaborate with interdisciplinary team and initiate plan and interventions as ordered  Monitor patient's weight and dietary intake as ordered or per policy  Utilize nutrition screening tool and intervene as necessary  Determine patient's food preferences and provide high-protein, high-caloric foods as appropriate       INTERVENTIONS:  - Monitor oral intake, urinary output, labs, and treatment plans  - Assess nutrition and hydration status and recommend course of action  - Evaluate amount of meals eaten  - Assist patient with eating if necessary   - Allow adequate time for meals  - Recommend/ encourage appropriate diets, oral nutritional supplements, and vitamin/mineral supplements  - Order, calculate, and assess calorie counts as needed  - Recommend, monitor, and adjust tube feedings and TPN/PPN based on assessed needs  - Assess need for intravenous fluids  - Provide specific nutrition/hydration education as appropriate  - Include patient/family/caregiver in decisions related to nutrition  Outcome: Progressing

## 2022-11-27 NOTE — ASSESSMENT & PLAN NOTE
Medications reviewed -> Senokot previously discontinued  Diarrhea has been waxing/waning but persistent over the last several days despite PRN Imodium ordered previously  Continue IV fluid hydration monitor/replete electrolyte deficiencies if present  Rectal tube remains in place  Appreciated gastroenterology input -> pending bacterial/viral/parasitic infectious workup -> hold Imodium until infectious workup negative

## 2022-11-27 NOTE — CONSULTS
Consultation - [address] [facility]  Lilly Daughters 58 y o  male MRN: [id number]  Unit/Bed#: [facility] 907-01 Encounter: [id number]              Inpatient consult to gastroenterology     Performed by  Alexx Hernandez DO     Authorized by Kathleen Kumar MD              Reason for Consult / Principal Problem:     Intractable diarrhea      ASSESSMENT AND PLAN:      20-year-old male with past medical history of CVA, antiphospholipid syndrome on warfarin, thoracic aortic aneurysm, generalized anxiety disorder, cognitive impairment who [place] [facility] [place], [place]  GI is consulted for intractable diarrhea  1  Intractable diarrhea  Suspect acute during hospitalization per chart review no history of diarrhea  No recent antibiotic use  Has rectal tube in place with profuse tan stool  Etiologies are broad but most likely infective viral versus bacterial   • Hold Imodium until infective causes have been ruled out  • Agree with C diff testing  • Add Stool enteric panel, ova parasites, Giardia  • If no infective causes are identified can start Imodium scheduled  • Monitor stool output  2  Anemia  3  Anti phospholipid syndrome  History of multiple strokes anticoagulated with warfarin  INR currently 1 63  No signs of overt bleeding  Hemoglobin stable at 10 7  Slightly elevated MCV  Vitamin B12 291 in September  · Supplement vitamin B12  · Monitor hemoglobin, transfuse for less than 7   · Anticoagulation per primary team     4  Colon cancer screening   Previous colonoscopy in 2020 showed 1 subcentimeter polyp  Was due for repeat in 5 years  · Repeat colonoscopy in 2025  Rest of care per primary team   Thank you for this consultation     ______________________________________________________________________    HPI:  20-year-old male with past medical history of CVA, antiphospholipid syndrome on warfarin, thoracic aortic aneurysm, generalized anxiety disorder, cognitive impairment who presented to [facility] [place] [facility] with fatigue, decreased appetite  GI is consulted for intractable diarrhea  Patient unable to provide significant history  Denies any abdominal pain, nausea, vomiting  Diarrhea appears to have started during hospitalization  Denies any blood in stool  REVIEW OF SYSTEMS:    Review of Systems   Constitutional: Negative for chills and fever  HENT: Negative for congestion and sinus pressure  Respiratory: Negative for cough and shortness of breath  Cardiovascular: Negative for chest pain, palpitations and leg swelling  Gastrointestinal: Positive for diarrhea  Negative for abdominal pain, nausea and vomiting  Genitourinary: Negative for dysuria and hematuria  Musculoskeletal: Negative for arthralgias and back pain  Skin: Negative for color change and rash  Neurological: Negative for dizziness and headaches  Psychiatric/Behavioral: Negative for agitation and confusion  All other systems reviewed and are negative           Historical Information   Past Medical History:   Diagnosis Date   • Aneurysm of thoracic aorta     last assessed 11/20/17   • Anxiety     currently on no meds   • Arthritis    • Bilateral inguinal hernia    • Cardiac disease    • Cognitive impairment    • CVA (cerebral vascular accident) ([facility] )    • Depression    • GERD (gastroesophageal reflux disease)    • Hearing loss of aging    • Heart disease    • Hyperlipidemia    • Hypertension    • Irritable bowel syndrome    • Kidney stone    • Obesity    • Occasional tremors     left arm since stroke   • Palpitations    • Panic attack    • PONV (postoperative nausea and vomiting)     and headache x 3 days   • Psychiatric illness    • Sciatica     right and left  side   • Shortness of breath     on exertion   • Sleep apnea     is not using a CPAP   • Sleep difficulties    • Spitting up blood 05/21/2021    Resolved   • Status post placement of implantable loop recorder    • Stroke ([facility] ) 11/2014   • Stroke [facility]) 03/2021   • TIA (transient ischemic attack)      Past Surgical History:   Procedure Laterality Date   • AORTA SURGERY      thoracic - aneurysmorrhaphy   • APPENDECTOMY     • ASCENDING AORTIC ANEURYSM REPAIR      resolved 8/19/15   • CARDIAC LOOP RECORDER     • CHOLECYSTECTOMY      laparoscopic   • COLONOSCOPY     • CYSTOSCOPY      with removal of object- stent removal   • KNEE ARTHROSCOPY Right 1996    with medial meniscus repair   • LITHOTRIPSY      renal   • ORTHOPEDIC SURGERY     • OK FRAGMENT KIDNEY STONE/ ESWL Left 5/17/2018    Procedure: LITHROTRIPSY EXTRACORPORAL SHOCKWAVE (ESWL);   Surgeon: Sonny Powers MD;  Location: [facility]  MAIN OR;  Service: Urology   • [address] [place] [address] Street Bilateral 12/9/2021    Procedure: ROBOTIC [address] [facility];  Surgeon: Vesta Kawasaki, MD;  Location: [facility] Main OR;  Service: General   • THUMB ARTHROSCOPY Right 1976    ligament repair   • UPPER GASTROINTESTINAL ENDOSCOPY       Social History   Social History     Substance and Sexual Activity   Alcohol Use Not Currently    Comment: very rare in past     Social History     Substance and Sexual Activity   Drug Use Never     Social History     Tobacco Use   Smoking Status Never   Smokeless Tobacco Never   Tobacco Comments    no second hand smoke exposure     Family History   Problem Relation Age of Onset   • Diverticulitis Mother         of colon   • Nephrolithiasis Mother    • Emphysema Father    • Nephrolithiasis Sister    • Heart attack Maternal Grandfather 72   • Breast cancer Paternal Grandmother    • Lung cancer Paternal Grandfather    • Cancer Family    • Coronary artery disease Family    • Diabetes Family         sibling   • Hypertension Family         sibling   • Hernia Family         sibling       Meds/Allergies     Medications Prior to Admission   Medication   • amLODIPine (NORVASC) 5 mg tablet   • ARIPiprazole (ABILIFY) 5 mg tablet   • atorvastatin (LIPITOR) 40 mg tablet   • carvedilol (COREG) 25 mg tablet   • docusate sodium (COLACE) 100 mg capsule   • famotidine (PEPCID) 40 MG tablet   • LORazepam (ATIVAN) 0 5 mg tablet   • melatonin 5 MG TABS   • mirtazapine (REMERON) 45 MG tablet   • pantoprazole (PROTONIX) 40 mg tablet   • torsemide (DEMADEX) 10 mg tablet   • warfarin (COUMADIN) 4 mg tablet     Current Facility-Administered Medications   Medication Dose Route Frequency   • busPIRone (BUSPAR) tablet 5 mg  5 mg Oral BID   • cyanocobalamin (VITAMIN B-12) tablet 500 mcg  500 mcg Oral Daily   • cyanocobalamin injection 1,000 mcg  1,000 mcg Intramuscular Q30 Days   • enoxaparin (LOVENOX) subcutaneous injection 90 mg  1 mg/kg Subcutaneous Q12H [facility]   • escitalopram (LEXAPRO) tablet 10 mg  10 mg Oral Daily   • fluticasone (FLONASE) 50 mcg/act nasal spray 1 spray  1 spray Each Nare Daily   • folic acid (FOLVITE) tablet 1 mg  1 mg Oral Daily   • LORazepam (ATIVAN) tablet 0 5 mg  0 5 mg Oral BID   • metoprolol (LOPRESSOR) injection 5 mg  5 mg Intravenous Q6H PRN   • metoprolol succinate (TOPROL-XL) 24 hr tablet 100 mg  100 mg Oral Daily   • multivitamin stress formula tablet 1 tablet  1 tablet Oral Daily   • pantoprazole (PROTONIX) EC tablet 40 mg  40 mg Oral Early Morning   • potassium chloride (K-DUR,KLOR-CON) CR tablet 40 mEq  40 mEq Oral BID   • sodium chloride (OCEAN) 0 65 % nasal spray 1 spray  1 spray Each Nare Q1H PRN   • sodium chloride 0 9 % with KCl 40 mEq/L infusion (premix)  100 mL/hr Intravenous Continuous   • sodium phosphate 30 mmol in dextrose 5 % 250 mL Infusion  30 mmol Intravenous Once   • thiamine tablet 100 mg  100 mg Oral Daily   • warfarin (COUMADIN) tablet 5 mg  5 mg Oral QAM       Allergies   Allergen Reactions   • Losartan Angioedema   • Aspirin Fatigue     Due to blood thinners     • Bactrim [Sulfamethoxazole-Trimethoprim]    • Eliquis [Apixaban] Other (See Comments)     Failed  Had embolic CVA   • Lisinopril      Felt bad, was OK with Zestril brand name     • Tramadol Objective     Blood pressure 144/95, pulse 82, temperature 97 5 °F (36 4 °C), resp  rate 18, height 5' 8" (1 727 m), weight 84 5 kg (186 lb 4 6 oz), SpO2 97 %  Body mass index is 28 33 kg/m²  Intake/Output Summary (Last 24 hours) at 11/27/2022 1454  Last data filed at 11/27/2022 1227  Gross per 24 hour   Intake 1923 33 ml   Output 500 ml   Net 1423 33 ml         PHYSICAL EXAM:      Physical Exam  Vitals and nursing note reviewed  Constitutional:       General: He is not in acute distress  Appearance: Normal appearance  He is well-developed  He is obese  He is not ill-appearing  HENT:      Head: Normocephalic and atraumatic  Mouth/Throat:      Mouth: Mucous membranes are moist    Eyes:      Extraocular Movements: Extraocular movements intact  Conjunctiva/sclera: Conjunctivae normal       Pupils: Pupils are equal, round, and reactive to light  Cardiovascular:      Rate and Rhythm: Normal rate  Pulses: Normal pulses  Heart sounds: Normal heart sounds  Pulmonary:      Effort: Pulmonary effort is normal    Abdominal:      General: Abdomen is flat  Bowel sounds are normal  There is no distension  Palpations: Abdomen is soft  Tenderness: There is no abdominal tenderness  There is no guarding  Musculoskeletal:      Cervical back: Neck supple  Right lower leg: No edema  Left lower leg: No edema  Skin:     General: Skin is warm and dry  Neurological:      General: No focal deficit present  Mental Status: He is alert and oriented to person, place, and time  Psychiatric:         Mood and Affect: Mood normal          Behavior: Behavior normal           Lab Results:   No results displayed because visit has over 200 results  Imaging Studies: I have personally reviewed pertinent imaging studies  [address] [facility]    Gastroenterology Fellow  PGY-4  Available via [employer]  11/27/2022 2:54 PM

## 2022-11-27 NOTE — ASSESSMENT & PLAN NOTE
Monitor/replete serum phosphate/potassium/magnesium as necessary  Consider insensible losses in the setting of intractable diarrhea

## 2022-11-27 NOTE — ASSESSMENT & PLAN NOTE
Previous admission from the end of September until the end of October at [facility] in which he was hospitalized for over a month for severe anxiety and inability to care for self  Per sister reportedly was able to ambulate prior to the hospitalization but after the hospitalization patient needing assistance with ambulation at home  During hospitalization there patient developed hyponatremia during his course of stay and Zoloft that had been started was stopped due to hyponatremia  Recently discharged from inpatient psychiatry with mirtazapine and aripiprazole, discussed resumption of thesewith the patient but he reports excess sedation with these   D/w psychiatry earlier this week started buspar 5mg BID, and lexapro  He does admit to depressive symptoms and anxiety, discussing certain topics with him result is worsening tremor  Monitor response to antidepressants  Reconsulted psychiatry for re-evaluation / possible IP psych admission for [facility] psychiatry on 11/18 recommended inpatient psychiatry admission, in-person evaluation with our on-site psychiatrist 11/21 did not feel this was necessary  Recommended increasing escitalopram, making lorazepam scheduled as patient does not ask for it but his anxiety might benefit from it, and continuing BusPar  Overall plan is to help patient improve his anxiety so that he can get to rehab    Per updated discussion w/ psychiatry -> continue current Lexapro/Buspar/Ativan regimen -> okay for discharge to skilled rehab from a psychiatric point of view -> awaiting placement, however, patient likely undergoing "OPTIONS" process prior to skilled rehab placement due to underlying psychiatric history

## 2022-11-28 LAB
ANION GAP SERPL CALCULATED.3IONS-SCNC: 7 MMOL/L (ref 4–13)
BACTERIA BLD CULT: NORMAL
BACTERIA BLD CULT: NORMAL
BASOPHILS # BLD AUTO: 0 THOUSANDS/ÂΜL (ref 0–0.1)
BASOPHILS NFR BLD AUTO: 0 % (ref 0–1)
BUN SERPL-MCNC: 6 MG/DL (ref 5–25)
C DIFF TOX GENS STL QL NAA+PROBE: NEGATIVE
CALCIUM SERPL-MCNC: 8.3 MG/DL (ref 8.3–10.1)
CAMPYLOBACTER DNA SPEC NAA+PROBE: NORMAL
CHLORIDE SERPL-SCNC: 111 MMOL/L (ref 96–108)
CO2 SERPL-SCNC: 22 MMOL/L (ref 21–32)
CREAT SERPL-MCNC: 0.56 MG/DL (ref 0.6–1.3)
EOSINOPHIL # BLD AUTO: 0.09 THOUSAND/ÂΜL (ref 0–0.61)
EOSINOPHIL NFR BLD AUTO: 2 % (ref 0–6)
ERYTHROCYTE [DISTWIDTH] IN BLOOD BY AUTOMATED COUNT: 16.8 % (ref 11.6–15.1)
GFR SERPL CREATININE-BSD FRML MDRD: 110 ML/MIN/1.73SQ M
GLUCOSE SERPL-MCNC: 115 MG/DL (ref 65–140)
HCT VFR BLD AUTO: 33.3 % (ref 36.5–49.3)
HGB BLD-MCNC: 11.1 G/DL (ref 12–17)
IMM GRANULOCYTES # BLD AUTO: 0.02 THOUSAND/UL (ref 0–0.2)
IMM GRANULOCYTES NFR BLD AUTO: 0 % (ref 0–2)
INR PPP: 1.37 (ref 0.84–1.19)
LYMPHOCYTES # BLD AUTO: 0.72 THOUSANDS/ÂΜL (ref 0.6–4.47)
LYMPHOCYTES NFR BLD AUTO: 12 % (ref 14–44)
MAGNESIUM SERPL-MCNC: 1.8 MG/DL (ref 1.6–2.6)
MCH RBC QN AUTO: 31.9 PG (ref 26.8–34.3)
MCHC RBC AUTO-ENTMCNC: 33.3 G/DL (ref 31.4–37.4)
MCV RBC AUTO: 96 FL (ref 82–98)
MONOCYTES # BLD AUTO: 0.44 THOUSAND/ÂΜL (ref 0.17–1.22)
MONOCYTES NFR BLD AUTO: 7 % (ref 4–12)
NEUTROPHILS # BLD AUTO: 4.88 THOUSANDS/ÂΜL (ref 1.85–7.62)
NEUTS SEG NFR BLD AUTO: 79 % (ref 43–75)
NRBC BLD AUTO-RTO: 0 /100 WBCS
PHOSPHATE SERPL-MCNC: 3.1 MG/DL (ref 2.3–4.1)
PLATELET # BLD AUTO: 149 THOUSANDS/UL (ref 149–390)
PMV BLD AUTO: 9.9 FL (ref 8.9–12.7)
POTASSIUM SERPL-SCNC: 3.4 MMOL/L (ref 3.5–5.3)
PROTHROMBIN TIME: 17.1 SECONDS (ref 11.6–14.5)
RBC # BLD AUTO: 3.48 MILLION/UL (ref 3.88–5.62)
SALMONELLA DNA SPEC QL NAA+PROBE: NORMAL
SHIGA TOXIN STX GENE SPEC NAA+PROBE: NORMAL
SHIGELLA DNA SPEC QL NAA+PROBE: NORMAL
SODIUM SERPL-SCNC: 140 MMOL/L (ref 135–147)
WBC # BLD AUTO: 6.15 THOUSAND/UL (ref 4.31–10.16)

## 2022-11-28 RX ORDER — WARFARIN SODIUM 7.5 MG/1
7.5 TABLET ORAL EVERY EVENING
Status: DISCONTINUED | OUTPATIENT
Start: 2022-11-29 | End: 2022-11-30

## 2022-11-28 RX ORDER — MAGNESIUM SULFATE HEPTAHYDRATE 40 MG/ML
2 INJECTION, SOLUTION INTRAVENOUS ONCE
Status: COMPLETED | OUTPATIENT
Start: 2022-11-28 | End: 2022-11-29

## 2022-11-28 RX ORDER — LOPERAMIDE HYDROCHLORIDE 2 MG/1
2 CAPSULE ORAL 4 TIMES DAILY PRN
Status: DISCONTINUED | OUTPATIENT
Start: 2022-11-28 | End: 2022-11-30

## 2022-11-28 RX ORDER — LORAZEPAM 0.5 MG/1
0.5 TABLET ORAL ONCE
Status: COMPLETED | OUTPATIENT
Start: 2022-11-28 | End: 2022-11-28

## 2022-11-28 RX ADMIN — WARFARIN SODIUM 5 MG: 5 TABLET ORAL at 09:02

## 2022-11-28 RX ADMIN — FOLIC ACID 1 MG: 1 TABLET ORAL at 09:01

## 2022-11-28 RX ADMIN — POTASSIUM CHLORIDE AND SODIUM CHLORIDE 100 ML/HR: 900; 300 INJECTION, SOLUTION INTRAVENOUS at 13:35

## 2022-11-28 RX ADMIN — LORAZEPAM 0.5 MG: 0.5 TABLET ORAL at 04:04

## 2022-11-28 RX ADMIN — ENOXAPARIN SODIUM 90 MG: 100 INJECTION SUBCUTANEOUS at 20:12

## 2022-11-28 RX ADMIN — LORAZEPAM 0.5 MG: 0.5 TABLET ORAL at 09:02

## 2022-11-28 RX ADMIN — MAGNESIUM SULFATE HEPTAHYDRATE 2 G: 40 INJECTION, SOLUTION INTRAVENOUS at 10:35

## 2022-11-28 RX ADMIN — METOPROLOL SUCCINATE 100 MG: 100 TABLET, EXTENDED RELEASE ORAL at 09:02

## 2022-11-28 RX ADMIN — THIAMINE HCL TAB 100 MG 100 MG: 100 TAB at 09:02

## 2022-11-28 RX ADMIN — BUSPIRONE HYDROCHLORIDE 5 MG: 5 TABLET ORAL at 17:42

## 2022-11-28 RX ADMIN — ENOXAPARIN SODIUM 90 MG: 100 INJECTION SUBCUTANEOUS at 09:08

## 2022-11-28 RX ADMIN — FLUTICASONE PROPIONATE 1 SPRAY: 50 SPRAY, METERED NASAL at 09:13

## 2022-11-28 RX ADMIN — CYANOCOBALAMIN TAB 500 MCG 500 MCG: 500 TAB at 09:02

## 2022-11-28 RX ADMIN — LORAZEPAM 0.5 MG: 0.5 TABLET ORAL at 17:42

## 2022-11-28 RX ADMIN — POTASSIUM CHLORIDE AND SODIUM CHLORIDE 100 ML/HR: 900; 300 INJECTION, SOLUTION INTRAVENOUS at 23:40

## 2022-11-28 RX ADMIN — POTASSIUM PHOSPHATE, MONOBASIC AND POTASSIUM PHOSPHATE, DIBASIC 30 MMOL: 224; 236 INJECTION, SOLUTION, CONCENTRATE INTRAVENOUS at 13:34

## 2022-11-28 RX ADMIN — B-COMPLEX W/ C & FOLIC ACID TAB 1 TABLET: TAB at 09:01

## 2022-11-28 RX ADMIN — ESCITALOPRAM OXALATE 10 MG: 10 TABLET ORAL at 09:01

## 2022-11-28 RX ADMIN — POTASSIUM CHLORIDE AND SODIUM CHLORIDE 100 ML/HR: 900; 300 INJECTION, SOLUTION INTRAVENOUS at 02:30

## 2022-11-28 RX ADMIN — BUSPIRONE HYDROCHLORIDE 5 MG: 5 TABLET ORAL at 09:01

## 2022-11-28 NOTE — PLAN OF CARE
Problem: Potential for Falls  Goal: Patient will remain free of falls  Description: INTERVENTIONS:  - Educate patient/family on patient safety including physical limitations  - Instruct patient to call for assistance with activity   - Consult OT/PT to assist with strengthening/mobility   - Keep Call bell within reach  - Keep bed low and locked with side rails adjusted as appropriate  - Keep care items and personal belongings within reach  - Initiate and maintain comfort rounds  - Make Fall Risk Sign visible to staff  - Apply yellow socks and bracelet for high fall risk patients  - Consider moving patient to room near nurses station  Outcome: Progressing     Problem: PAIN - ADULT  Goal: Verbalizes/displays adequate comfort level or baseline comfort level  Description: Interventions:  - Encourage patient to monitor pain and request assistance  - Assess pain using appropriate pain scale  - Administer analgesics based on type and severity of pain and evaluate response  - Implement non-pharmacological measures as appropriate and evaluate response  - Consider cultural and social influences on pain and pain management  - Notify physician/advanced practitioner if interventions unsuccessful or patient reports new pain  Outcome: Progressing

## 2022-11-28 NOTE — RESTORATIVE TECHNICIAN NOTE
Restorative Technician Note      Patient Name: Jourdan Guthrie Tech Visit Date: 11/28/22  Note Type: Mobility  Patient Position Upon Consult: Bedside chair  Activity Performed: Transferred  Assistive Device: Other (Comment) (BHHAx2; stand-pivot transfer back to bed)  Education Provided: Yes  Patient Position at End of Consult: Supine; Bed/Chair alarm activated;  All needs within [facility]

## 2022-11-28 NOTE — PLAN OF CARE
Problem: PHYSICAL THERAPY ADULT  Goal: Performs mobility at highest level of function for planned discharge setting  See evaluation for individualized goals  Description: Treatment/Interventions: LE strengthening/ROM, Functional transfer training, Endurance training, Patient/family training, Bed mobility, Cognitive reorientation, Therapeutic exercise          See flowsheet documentation for full assessment, interventions and recommendations  11/28/2022 1433 by Marisel Fraser  Outcome: Progressing  Note: Prognosis: Guarded  Problem List: Decreased strength, Decreased range of motion, Decreased endurance, Impaired balance, Decreased mobility, Decreased cognition, Impaired judgement, Decreased safety awareness, Pain  Assessment: Pt is progressing with endurance ambulating within the room with RW and Mod (A) x1  He still presents with overall weakness and cognitive and balance deficits requiring Mod (A) x 2 for bed mobility and transfers  Therapeutic exercises were performed this session to work on LE strengthen and coordination as well as dynamic sitting balance  Pt was able to perform seated exercises with (S)  It is recommended the pt continue skilled therapy while in the hospital and rehab is recommended upon D/C when medically cleared; PT will follow  Barriers to Discharge: Inaccessible home environment, Decreased caregiver support     PT Discharge Recommendation: Post acute rehabilitation services    See flowsheet documentation for full assessment

## 2022-11-28 NOTE — CASE MANAGEMENT
Case Management Discharge Planning Note    Patient name Arlene Curran  Location [address] [place] [address] [facility] [id number]  : 1960 Date 2022       Current Admission Date: 11/3/2022  Current Admission Diagnosis:SHIMON (generalized anxiety disorder)   Patient Active Problem List    Diagnosis Date Noted   • Diarrhea 2022   • Cognitive impairment 2022   • Hyperthyroidism 11/10/2022   • Severe protein-calorie malnutrition  2022   • Hypernatremia 2022   • Hyponatremia 10/14/2022   • SIADH (syndrome of inappropriate ADH production) ([facility] ) 10/14/2022   • Subclinical hyperthyroidism 10/14/2022   • Sinus arrhythmia 10/10/2022   • Mild protein-calorie malnutrition ([facility] ) 10/07/2022   • Medical clearance for incarceration 2022   • 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 attack 2022   • Atypical chest pain 2022   • Multiple electrolyte abnormalities 2022   • Dizziness 2022   • Renal cyst 2022   • PVD (peripheral vascular disease) ([facility] ) 2022   • Ataxia 2022   • History of CVA (cerebrovascular accident) 2022   • Right inguinal pain 2022   • Alkaline phosphatase elevation 2022   • Low back pain 2022   • S/P laparoscopic hernia repair 2021   • Non-recurrent bilateral inguinal hernia without obstruction or gangrene 2021   • Gastric polyps 2021   • Gastric AVM 2021   • Edema of both lower legs 2021   • Tremor of right hand 2021   • Weakness of both lower extremities 2021   • Anticoagulated on Coumadin 2021   • CORIE (obstructive sleep apnea)    • Hyperbilirubinemia 08/10/2020   • Antiphospholipid antibody with hypercoagulable state  2020   • History of stroke 2020   • Other viral warts 2019   • Physical deconditioning 2019   • Class 2 obesity in adult 2018   • Bright red blood per rectum 11/10/2017   • Numbness 2017   • H/O aortic aneurysm repair 08/26/2017   • Hypertension 08/26/2017   • GERD (gastroesophageal reflux disease) 08/26/2017   • Hyperlipidemia 03/17/2017   • Peyronie disease 12/09/2016   • Vitamin D deficiency 06/15/2016   • Atrial fibrillation 11/17/2014   • SHIMON (generalized anxiety disorder) 11/11/2014   • Constipation 09/19/2014   • Irritable bowel syndrome 04/24/2014   • Kidney stones 04/24/2014      LOS (days): 25  Geometric Mean LOS (GMLOS) (days): 5 00  Days to GMLOS:-19 6     OBJECTIVE:  Risk of Unplanned Readmission Score: 40 25         Current admission status: Inpatient   Preferred Pharmacy:   [facility]/pharmacy #[id number] [id number]  Phone: [phone] Fax: [phone]    Primary Care Provider: Suki Ceja MD    Primary Insurance: [address] [place] [address] Drive:     DISCHARGE DETAILS:         Other Referral/Resources/Interventions Provided:  Referral Comments: Call placed to [facility] today  They have confirmed that they have received all necessary paperwork at this time, and that they will work on processing the determination

## 2022-11-28 NOTE — OCCUPATIONAL THERAPY NOTE
Occupational Therapy Progress Note     Patient Name: Raissa VIEIRA'REYNALDO Date: 11/28/2022  Problem List  Principal Problem:    SHIMON (generalized anxiety disorder)  Active Problems:    Atrial fibrillation    Antiphospholipid antibody with hypercoagulable state     Tremor of right hand    History of CVA (cerebrovascular accident)    Multiple electrolyte abnormalities    Hypernatremia    Severe protein-calorie malnutrition     Hyperthyroidism    Cognitive impairment    Diarrhea       11/28/22 0950   OT Last Visit   OT Visit Date 11/28/22   Note Type   Note Type Treatment   Pain Assessment   Pain Assessment Tool 0-10   Pain Score No Pain   Restrictions/Precautions   Other Precautions Cognitive; Chair Alarm; Bed Alarm; Fall Risk  (rectal tube)   Lifestyle   Autonomy Per EMR, at baseline pt is I with ADLS and mobility  Reciprocal Relationships Sister   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   Intrinsic Gratification Building model cars and walking   ADL   Where Assessed Edge of bed   Grooming Assistance 4  Minimal Assistance   Grooming Deficit Brushing hair;Wash/dry face   Grooming Comments + washing hair with shampoo cap  A for thoroughness   UB Dressing Assistance 4  Minimal Assistance   UB Dressing Deficit Thread RUE; Thread LUE   LB Dressing Assistance 2  Maximal Assistance   LB Dressing Deficit Don/doff R sock; Don/doff L sock   LB Dressing Comments Pt able to pull R sock over heel when placed into tailor sit position   Bed Mobility   Supine to Sit 3  Moderate assistance   Additional items Assist x 2;HOB elevated; Bedrails; Increased time required;LE management   Sit to Supine Unable to assess   Transfers   Sit to Stand 3  Moderate assistance   Additional items Assist x 2; Increased time required;Verbal cues   Stand to Sit 3  Moderate assistance   Additional items Assist x 2; Increased time required;Verbal cues   Additional Comments initially requires MAX A x 2   Progressed to MOD A x 2 with RW Functional Mobility   Functional Mobility 3  Moderate assistance   Additional Comments Ax1-2 + chair follow   Additional items Rolling walker   Cognition   Overall Cognitive Status Impaired   Arousal/Participation Responsive; Cooperative   Attention Attends with cues to redirect   Orientation Level Oriented to person;Oriented to place;Oriented to time   Memory Unable to assess   Following Commands Follows one step commands with increased time or repetition   Comments Pt with increased responsivenss and command following  Pt with increased ability to answer questions and verbalize  Does have continued flat affect   Activity Tolerance   Activity Tolerance Patient limited by fatigue   Medical Staff Made Aware PT RAJESH Enrique  RN clearance for session   Assessment   Assessment Patient participated in Skilled OT session this date with interventions consisting of ADL re training with the use of correct body mechnaics, Energy Conservation techniques, safety awareness and fall prevention techniques,  therapeutic activities to: increase activity tolerance, increase dynamic sit/ stand balance during functional activity  and increase OOB/ sitting tolerance   Upon arrival patient was found supine in bed  Pt demonstrated the following tasks: MOD A x 2 sup to sit, STS, and MOD A x 1-2 for fnxl mobility with RW  Pt performs grooming tasks and UBD with MIN A, MAX A for LBD  Patient continues to be functioning below baseline level, occupational performance remains limited secondary to factors listed above and increased risk for falls and injury  From OT standpoint, recommendation at time of d/c would be STR  Patient to benefit from continued Occupational Therapy treatment while in the hospital to address deficits as defined above and maximize level of functional independence with ADLs and functional mobility  Pt was left in chair with alarm on after session with all current needs met   The patient's raw score on the AM-PAC Daily Activity inpatient short form is 15, standardized score is 34 69, less than 39 4  Patients at this level are likely to benefit from discharge to post-acute rehabilitation services  Please refer to the recommendation of the Occupational Therapist for safe discharge planning  Plan   Treatment Interventions ADL retraining;UE strengthening/ROM; Endurance training;Cognitive reorientation;Patient/family training;Equipment evaluation/education; Compensatory technique education;Continued evaluation; Energy conservation; Activityengagement;Visual perceptual retraining;Functional transfer training;Fine motor coordination activities   Goal Expiration Date 12/21/22   OT Treatment Day 8   OT Frequency   (2-4x/wk)   Recommendation   OT Discharge Recommendation Post acute rehabilitation services   AM-PAC Daily Activity Inpatient   Lower Body Dressing 2   Bathing 2   Toileting 2   Upper Body Dressing 3   Grooming 3   Eating 3   Daily Activity Raw Score 15   Daily Activity Standardized Score (Calc for Raw Score >=11) 34 69   AM-PAC Applied Cognition Inpatient   Following a Speech/Presentation 2   Understanding Ordinary Conversation 3   Taking Medications 3   Remembering Where Things Are Placed or Put Away 2   Remembering List of 4-5 Errands 2   Taking Care of Complicated Tasks 1   Applied Cognition Raw Score 13   Applied Cognition Standardized Score 30 46     Amparo Colunga MS, OTR/L

## 2022-11-28 NOTE — PLAN OF CARE
Problem: OCCUPATIONAL THERAPY ADULT  Goal: Performs self-care activities at highest level of function for planned discharge setting  See evaluation for individualized goals  Description: Treatment Interventions: ADL retraining, Functional transfer training, UE strengthening/ROM, Endurance training, Patient/family training, Cognitive reorientation, Equipment evaluation/education, Compensatory technique education, Continued evaluation, Energy conservation, Activityengagement          See flowsheet documentation for full assessment, interventions and recommendations  Outcome: Progressing  Note: Limitation: Decreased ADL status, Decreased endurance, Decreased cognition, Decreased self-care trans, Decreased high-level ADLs  Prognosis: Fair  Assessment: Patient participated in Skilled OT session this date with interventions consisting of ADL re training with the use of correct body mechnaics, Energy Conservation techniques, safety awareness and fall prevention techniques,  therapeutic activities to: increase activity tolerance, increase dynamic sit/ stand balance during functional activity  and increase OOB/ sitting tolerance   Upon arrival patient was found supine in bed  Pt demonstrated the following tasks: MOD A x 2 sup to sit, STS, and MOD A x 1-2 for fnxl mobility with RW  Pt performs grooming tasks and UBD with MIN A, MAX A for LBD  Patient continues to be functioning below baseline level, occupational performance remains limited secondary to factors listed above and increased risk for falls and injury  From OT standpoint, recommendation at time of d/c would be STR  Patient to benefit from continued Occupational Therapy treatment while in the hospital to address deficits as defined above and maximize level of functional independence with ADLs and functional mobility  Pt was left in chair with alarm on after session with all current needs met   The patient's raw score on the AM-PAC Daily Activity inpatient short form is 15, standardized score is 34 69, less than 39 4  Patients at this level are likely to benefit from discharge to post-acute rehabilitation services  Please refer to the recommendation of the Occupational Therapist for safe discharge planning       OT Discharge Recommendation: Post acute rehabilitation services

## 2022-11-28 NOTE — PROGRESS NOTES
[facility]  Progress Note - Catalina Dumont 1960, 58 y o  male MRN: [id number]  [facility] Encounter: [id number]  Primary Care Provider: Dillon Pitts MD   Date and time admitted to hospital: 11/3/2022  3:28 PM      * SHIMON (generalized anxiety disorder)  Assessment & Plan  Previous admission from the end of September until the end of October at [facility] in which he was hospitalized for over a month for severe anxiety and inability to care for self  Per sister reportedly was able to ambulate prior to the hospitalization but after the hospitalization patient needing assistance with ambulation at home  During hospitalization there patient developed hyponatremia during his course of stay and Zoloft that had been started was stopped due to hyponatremia  Recently discharged from inpatient psychiatry with mirtazapine and aripiprazole, discussed resumption of thesewith the patient but he reports excess sedation with these   D/w psychiatry earlier this week started buspar 5mg BID, and lexapro  He does admit to depressive symptoms and anxiety, discussing certain topics with him result is worsening tremor  Monitor response to antidepressants  Reconsulted psychiatry for re-evaluation / possible IP psych admission for [facility] psychiatry on 11/18 recommended inpatient psychiatry admission, in-person evaluation with our on-site psychiatrist 11/21 did not feel this was necessary  Recommended increasing escitalopram, making lorazepam scheduled as patient does not ask for it but his anxiety might benefit from it, and continuing BusPar  Overall plan is to help patient improve his anxiety so that he can get to rehab    Per updated discussion w/ psychiatry -> continue current Lexapro/Buspar/Ativan regimen -> okay for discharge to skilled rehab from a psychiatric point of view -> awaiting placement, however, patient likely undergoing "OPTIONS" process prior to skilled rehab placement due to underlying psychiatric history    Cognitive impairment  Assessment & Plan  Per neuropsychology evaluation on 11/14, patient does NOT have capacity to make medical decisions   Patient also has some level of cognitive impairment   B12 borderline low -> s/p one time intramuscular B12 shot -> continue supplementation  Patient intermittently refusing medications per nursing    Diarrhea  Assessment & Plan  Medications reviewed -> Senokot previously discontinued  Diarrhea has been waxing/waning but persistent over the last several days despite PRN Imodium ordered previously  Continue IV fluid hydration monitor/replete electrolyte deficiencies if present  Rectal tube remains in place  Appreciated gastroenterology input -> pending bacterial/viral/parasitic infectious workup -> hold Imodium until infectious workup negative    Severe protein-calorie malnutrition   Assessment & Plan  BMI of 28 33 currently in the setting of decreased appetite/oral intake and unintentional weight loss  Continue nutritional supplementation w/ meals    Hypernatremia  Assessment & Plan  On presentation: sodium 151 -> progressively improved with normalization on 11/25  Appreciate nephrology input  Encourage increased oral intake  Continue serum sodium monitoring    Hyperthyroidism  Assessment & Plan  Per chart review patient has had low TSH and elevated free T4 in the past   Most recent labs during this admission with TSH 0 282 and free t4 1 56  Thyroid U/S during last admission at [facility] was unremarkable   Evaluated by endocrinology in the past during previous admission at [facility] (Oct 22) and they recommend get repeat TFT, TSI and thyroid abs   Appreciate endocrinology evaluation this hospital course -> likely subclinical hyperthyroidism -> repeat thyroid testing in 4-6 weeks once acute medical issues stabilized    Multiple electrolyte abnormalities  Assessment & Plan  Monitor/replete serum phosphate/potassium/magnesium as necessary  Consider insensible losses in the setting of intractable diarrhea    History of CVA (cerebrovascular accident)  Assessment & Plan  H/o CVA x 3 in setting of antiphospholipid syndrome  On Coumadin for anticoagulation  CT head in ER: No acute intracranial abnormality   Encephalomalacia involving the right occipital lobe and bilateral cerebelli   Grossly stable appearance of periventricular hypoattenuation compared to MRI brain 8/29/2022, likely representing chronic microvascular ischemic changes  Appreciate PT/OT input ->  plan for skilled rehab placement once medically stabilized    Tremor of right hand  Assessment & Plan  Outpatient follow-up w/ neurology - consider sequela of prior strokes ? ? Antiphospholipid antibody with hypercoagulable state   Assessment & Plan  Continue Coumadin regimen - had ischemic stroke in the past despite Eliquis, hence, deemed Eliquis failure  Due to subtherapeutic INR, Coumadin being bridged w/ therapeutic Lovenox currently    Atrial fibrillation  Assessment & Plan  Rate controlled on Toprol-XL  Continue Coumadin for anticoagulation (w/ therapeutic Lovenox bridge until INR at goal)      DVT Prophylaxis:  therapeutic Lovenox w/ Coumadin bridge       Patient Centered Rounds:  I have performed bedside rounds and discussed plan of care with nursing today  Discussions with Specialists or Other Care Team Provider:  see above assessments if applicable    Education and Discussions with Family / Patient: Patient at bedside, along w/ sister, Hodan Hartman, over the phone  Time Spent for Care:  32 minutes  More than 50% of total time spent on counseling and coordination of care as described above  Current Length of Stay: 25 day(s)  Current Patient Status: Inpatient   Certification Statement:  Patient will continue to require additional hospital stay due to assessments as noted above  Code Status: Level 3 - DNAR and DNI        Subjective:     Encountered earlier today    Sitting upright in a recliner chair  He is more awake/alert, than when compared to yesterday  States he still feels weak/fatigued  Objective:     Vitals:   Temp (24hrs), Av 9 °F (36 6 °C), Min:97 7 °F (36 5 °C), Max:98 2 °F (36 8 °C)    Temp:  [97 7 °F (36 5 °C)-98 2 °F (36 8 °C)] 98 2 °F (36 8 °C)  HR:  [] 118  Resp:  [18-20] 20  BP: (149-154)/() 154/100  SpO2:  [97 %-98 %] 97 %  Body mass index is 28 33 kg/m²  Input and Output Summary (last 24 hours): Intake/Output Summary (Last 24 hours) at 2022 1522  Last data filed at 2022 1335  Gross per 24 hour   Intake 1120 ml   Output 1250 ml   Net -130 ml       Physical Exam:     GENERAL:  Weak/fatigued  HEAD:  Normocephalic - atraumatic  EYES: PERRL - EOMI   MOUTH:  Mucosa moist  NECK:  Supple - full range of motion  CARDIAC:  Rate controlled - S1/S2 positive  PULMONARY:  Clear breath sounds bilaterally - nonlabored respirations  ABDOMEN:  Soft - nontender/nondistended - active bowel sounds  MUSCULOSKELETAL:  Motor strength/range of motion deconditioned  NEUROLOGIC:  Alert/awake but minimally talkative  SKIN:  Chronic wrinkles/blemishes   PSYCHIATRIC:  Mood/affect flat      Additional Data:     Labs & Recent Cultures:    Results from last 7 days   Lab Units 22  0553   WBC Thousand/uL 6 15   HEMOGLOBIN g/dL 11 1*   HEMATOCRIT % 33 3*   PLATELETS Thousands/uL 149   NEUTROS PCT % 79*   LYMPHS PCT % 12*   MONOS PCT % 7   EOS PCT % 2     Results from last 7 days   Lab Units 22  0553   POTASSIUM mmol/L 3 4*   CHLORIDE mmol/L 111*   CO2 mmol/L 22   BUN mg/dL 6   CREATININE mg/dL 0 56*   CALCIUM mg/dL 8 3     Results from last 7 days   Lab Units 22  0553   INR  1 37*             Results from last 7 days   Lab Units 22  1107   PROCALCITONIN ng/ml 0 11         Results from last 7 days   Lab Units 22  1812 22  1122 22  1106   BLOOD CULTURE   --  No Growth After 4 Days  No Growth After 5 Days     C DIFF TOXIN B BY PCR  Negative  --   --          Lines/Drains:  Invasive Devices     Peripheral Intravenous Line  Duration           Peripheral IV 11/27/22 Left;Ventral (anterior) Wrist 1 day          Drain  Duration           Rectal Tube With balloon 4 days                  Last 24 Hours Medication List:   Current Facility-Administered Medications   Medication Dose Route Frequency Provider Last Rate   • busPIRone  5 mg Oral BID Anupam Borrero MD     • vitamin B-12  500 mcg Oral Daily Tana Lott, DO     • cyanocobalamin  1,000 mcg Intramuscular Q30 Days Tana Lott, DO     • enoxaparin  1 mg/kg Subcutaneous Q12H [address] Anupam Borrero MD     • escitalopram  10 mg Oral Daily Braeden Salter MD     • fluticasone  1 spray Each Nare Daily Tana Lott DO     • folic acid  1 mg Oral Daily Anupam Borrero MD     • LORazepam  0 5 mg Oral BID Beatris Cuevas MD     • metoprolol  5 mg Intravenous Q6H PRN Parisa Horton MD     • metoprolol succinate  100 mg Oral Daily Beatris Cuevas MD     • multivitamin stress formula  1 tablet Oral Daily Anupam Borrero MD     • pantoprazole  40 mg Oral Early Morning Tana Lott, DO     • potassium chloride  40 mEq Oral BID Beatris Cuevas MD     • potassium phosphate  30 mmol Intravenous Once Parisa Horton MD 30 mmol (11/28/22 1334)   • sodium chloride  1 spray Each Nare Q1H PRN Beatris Cuevas MD     • sodium chloride 0 9 % with KCl 40 mEq/L  100 mL/hr Intravenous Continuous Parisa Horton  mL/hr (11/28/22 1335)   • thiamine  100 mg Oral Daily Anupam Borrero MD     • [START ON 11/29/2022] warfarin  7 5 mg Oral QPM Parisa Horton MD                        ** Please Note: This note is constructed using a voice recognition dictation system  An occasional wrong word/phrase or “sound-a-like” substitution may have been picked up by dictation device due to the inherent limitations of voice recognition software    Read the chart carefully and recognize, using reasonable context, where substitutions may have occurred  **

## 2022-11-28 NOTE — CASE MANAGEMENT
Case Management Discharge Planning Note    Patient name Gayle [facility]  Location [address] [place] [address] [id number]  : 1960 Date 2022       Current Admission Date: 11/3/2022  Current Admission Diagnosis:SHIMON (generalized anxiety disorder)   Patient Active Problem List    Diagnosis Date Noted   • Diarrhea 2022   • Cognitive impairment 2022   • Hyperthyroidism 11/10/2022   • Severe protein-calorie malnutrition  2022   • Hypernatremia 2022   • Hyponatremia 10/14/2022   • SIADH (syndrome of inappropriate ADH production) ([facility] [address] ) 10/14/2022   • Subclinical hyperthyroidism 10/14/2022   • Sinus arrhythmia 10/10/2022   • Mild protein-calorie malnutrition ([facility] [address] ) 10/07/2022   • Medical clearance for incarceration 2022   • 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 attack 2022   • Atypical chest pain 2022   • Multiple electrolyte abnormalities 2022   • Dizziness 2022   • Renal cyst 2022   • PVD (peripheral vascular disease) ([facility] [address] ) 2022   • Ataxia 2022   • History of CVA (cerebrovascular accident) 2022   • Right inguinal pain 2022   • Alkaline phosphatase elevation 2022   • Low back pain 2022   • S/P laparoscopic hernia repair 2021   • Non-recurrent bilateral inguinal hernia without obstruction or gangrene 2021   • Gastric polyps 2021   • Gastric AVM 2021   • Edema of both lower legs 2021   • Tremor of right hand 2021   • Weakness of both lower extremities 2021   • Anticoagulated on Coumadin 2021   • CORIE (obstructive sleep apnea)    • Hyperbilirubinemia 08/10/2020   • Antiphospholipid antibody with hypercoagulable state  2020   • History of stroke 2020   • Other viral warts 2019   • Physical deconditioning 2019   • Class 2 obesity in adult 2018   • Bright red blood per rectum 11/10/2017   • Numbness 2017   • H/O aortic aneurysm repair 08/26/2017   • Hypertension 08/26/2017   • GERD (gastroesophageal reflux disease) 08/26/2017   • Hyperlipidemia 03/17/2017   • Peyronie disease 12/09/2016   • Vitamin D deficiency 06/15/2016   • Atrial fibrillation 11/17/2014   • SHIMON (generalized anxiety disorder) 11/11/2014   • Constipation 09/19/2014   • Irritable bowel syndrome 04/24/2014   • Kidney stones 04/24/2014      LOS (days): 25  Geometric Mean LOS (GMLOS) (days): 5 00  Days to GMLOS:-19 7     OBJECTIVE:  Risk of Unplanned Readmission Score: 40 63         Current admission status: Inpatient   Preferred Pharmacy:   [facility]/pharmacy #[id number] [address]  Phone: [phone] Fax: [phone]    Primary Care Provider: Mally Lau MD    Primary Insurance: [employer]spinosa [address]:     DISCHARGE DETAILS:             Other Referral/Resources/Interventions Provided:  Referral Comments: Fax received from [facility], [place] stating that the patient meets criteria to be nursing home eligible  This has been faxed over to [employer] at [facility] at this time ([phone])  Per [employer] they will submit to the insurance company and follow up with insurance determination  UPDATE: [facility] IS ABLE TO ACCEPT THE PATIENT FOR ADMISSION TODAY, HOWEVER PER MD PATIENT IS NO LONGER MEDICALLY STABLE  PATIENT CURRENTLY HAS RECTAL TUBE IN PLACE AND IS UNDERGOING INFECTIOUS WORKUP  CM TO FOLLOW UP WITH FACILITY WHEN HE IS MEDICALLY CLEARED FOR DISCHARGE

## 2022-11-28 NOTE — PHYSICAL THERAPY NOTE
Physical Therapy Treament    Patient's Name: Kelsey Carver    Admitting Diagnosis  Rhabdomyolysis [M62 82]  Hypokalemia [E87 6]  Encephalomalacia [G93 89]  Anxiety [F41 9]  DAYAN (acute kidney injury) ([facility] ) [N17 9]  Decreased oral intake [R63 8]  Generalized weakness [R53 1]  Ambulatory dysfunction [R26 2]  AMS (altered mental status) [R41 82]    Problem List  Patient Active Problem List   Diagnosis   • Numbness   • H/O aortic aneurysm repair   • Hypertension   • GERD (gastroesophageal reflux disease)   • SHIMON (generalized anxiety disorder)   • Atrial fibrillation   • Bright red blood per rectum   • Constipation   • Irritable bowel syndrome   • Hyperlipidemia   • Kidney stones   • Peyronie disease   • Vitamin D deficiency   • Class 2 obesity in adult   • Other viral warts   • Physical deconditioning   • History of stroke   • Antiphospholipid antibody with hypercoagulable state    • Hyperbilirubinemia   • CORIE (obstructive sleep apnea)   • Anticoagulated on Coumadin   • Tremor of right hand   • Weakness of both lower extremities   • Gastric polyps   • Gastric AVM   • Edema of both lower legs   • Non-recurrent bilateral inguinal hernia without obstruction or gangrene   • S/P laparoscopic hernia repair   • Low back pain   • Alkaline phosphatase elevation   • Right inguinal pain   • Ataxia   • History of CVA (cerebrovascular accident)   • PVD (peripheral vascular disease) ([place] [address] )   • Renal cyst   • Atypical chest pain   • Multiple electrolyte abnormalities   • Dizziness   • Medical clearance for incarceration   • 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 attack   • Mild protein-calorie malnutrition ([place] [address] )   • Sinus arrhythmia   • Hyponatremia   • SIADH (syndrome of inappropriate ADH production) ([place] [address] )   • Subclinical hyperthyroidism   • Hypernatremia   • Severe protein-calorie malnutrition    • Hyperthyroidism   • Cognitive impairment   • Diarrhea       Past Medical History  Past Medical History: Diagnosis Date   • Aneurysm of thoracic aorta     last assessed 11/20/17   • Anxiety     currently on no meds   • Arthritis    • Bilateral inguinal hernia    • Cardiac disease    • Cognitive impairment    • CVA (cerebral vascular accident) ([facility] )    • Depression    • GERD (gastroesophageal reflux disease)    • Hearing loss of aging    • Heart disease    • Hyperlipidemia    • Hypertension    • Irritable bowel syndrome    • Kidney stone    • Obesity    • Occasional tremors     left arm since stroke   • Palpitations    • Panic attack    • PONV (postoperative nausea and vomiting)     and headache x 3 days   • Psychiatric illness    • Sciatica     right and left  side   • Shortness of breath     on exertion   • Sleep apnea     is not using a CPAP   • Sleep difficulties    • Spitting up blood 05/21/2021    Resolved   • Status post placement of implantable loop recorder    • Stroke ([facility] ) 11/2014   • Stroke ([facility] ) 03/2021   • TIA (transient ischemic attack)        Past Surgical History  Past Surgical History:   Procedure Laterality Date   • AORTA SURGERY      thoracic - aneurysmorrhaphy   • APPENDECTOMY     • ASCENDING AORTIC ANEURYSM REPAIR      resolved 8/19/15   • CARDIAC LOOP RECORDER     • CHOLECYSTECTOMY      laparoscopic   • COLONOSCOPY     • CYSTOSCOPY      with removal of object- stent removal   • KNEE ARTHROSCOPY Right 1996    with medial meniscus repair   • LITHOTRIPSY      renal   • ORTHOPEDIC SURGERY     • GA FRAGMENT KIDNEY STONE/ ESWL Left 5/17/2018    Procedure: LITHROTRIPSY EXTRACORPORAL SHOCKWAVE (ESWL);   Surgeon: Edgard Mei MD;  Location: [facility];  Service: Urology   • GA Ameena Raymundo 19 Bilateral 12/9/2021    Procedure: ROBOTIC REPAIR HERNIA INGUINAL;  Surgeon: Hayde Lemus MD;  Location: [facility];  Service: General   • THUMB ARTHROSCOPY Right 1976    ligament repair   • UPPER GASTROINTESTINAL ENDOSCOPY          11/28/22 0957   PT Last Visit   PT Visit Date 11/28/22 Note Type   Note Type Treatment   Education   Education Provided Yes   Pain Assessment   Pain Assessment Tool FLACC   Pain Location/Orientation Location: Buttocks   Pain Onset/Description Onset: Ongoing;Frequency: Intermittent; Descriptor: Discomfort   Effect of Pain on Daily Activities increases with sitting   Patient's Stated Pain Goal No pain   Hospital Pain Intervention(s) Repositioned; Ambulation/increased activity; Emotional support   Pain Rating: FLACC (Rest) - Face 1   Pain Rating: FLACC (Rest) - Legs 0   Pain Rating: FLACC (Rest) - Activity 0   Pain Rating: FLACC (Rest) - Cry 1   Pain Rating: FLACC (Rest) - Consolability 1   Score: FLACC (Rest) 3   Pain Rating: FLACC (Activity) - Face 1   Pain Rating: FLACC (Activity) - Legs 0   Pain Rating: FLACC (Activity) - Activity 0   Pain Rating: FLACC (Activity) - Cry 1   Pain Rating: FLACC (Activity) - Consolability 1   Score: FLACC (Activity) 3   Restrictions/Precautions   Other Precautions Cognitive; Chair Alarm;Multiple lines; Fall Risk;Bed Alarm;Pain  (Rectal tube; increased time to respond, Chair alarm activiated at end of session )   General   Chart Reviewed Yes   Additional Pertinent History cleared for tx session, spoke to nsg  Co-treatment performed with OT due to patients complexity and level of assistance required for mobilization  Response to Previous Treatment Patient with no complaints from previous session  Family/Caregiver Present No   Cognition   Overall Cognitive Status Impaired   Arousal/Participation Cooperative   Attention Attends with cues to redirect   Orientation Level Oriented to person;Oriented to place; Disoriented to time;Disoriented to situation   Memory Decreased short term memory;Decreased recall of recent events   Following Commands Follows one step commands with increased time or repetition   Subjective   Subjective alert in bed; agreed to mobilize, reports having pain with rectal tube   Bed Mobility   Rolling R 3  Moderate assistance   Additional items Assist x 2;Bedrails; Increased time required;Verbal cues;LE management   Supine to Sit 3  Moderate assistance   Additional items Assist x 2; Increased time required;Verbal cues   Sit to Supine 3  Moderate assistance   Additional items Assist x 2; Increased time required;Verbal cues   Transfers   Sit to Stand 3  Moderate assistance   Additional items Assist x 2; Increased time required;Verbal cues   Stand to Sit 3  Moderate assistance   Additional items Assist x 2; Increased time required;Verbal cues   Additional Comments 3 trials total to ambulate and perform standing exercises  Inital transfer Max (A) x2 --> Mod (A) x 2 with RW   Ambulation/Elevation   Gait pattern Inconsistent lucien; Foward flexed; Short stride; Excessively slow   Gait Assistance 3  Moderate assist   Additional items Assist x 1;Verbal cues; Assist x 2  (VCs for AD sequencing, close chair follow)   Assistive Device Rolling walker   Distance 8 ft total distance   Balance   Static Sitting Fair -   Dynamic Sitting Fair -   Static Standing Poor +   Dynamic Standing Poor   Ambulatory Poor   Endurance Deficit   Endurance Deficit Yes   Activity Tolerance   Activity Tolerance Patient limited by fatigue;Treatment limited secondary to medical complications (Comment)  (cognition and overall weakness)   Nurse Made Aware Spoke to RN   Exercises   Hip Abduction Sitting;10 reps;AROM; Bilateral   Hip Adduction Sitting;10 reps;AROM; Bilateral   Knee AROM Long Arc Quad Sitting;10 reps;AROM; Bilateral  (2 sets)   Ankle Pumps Sitting;10 reps;AROM; Bilateral  (2 sets)   Marching Sitting;10 reps;AROM; Bilateral  (2 sets)   Balance training  standing weight shifts for 1 minute with Mod (A) x 1; sitting at EOB 5-8 minutes with (S)/Min (A)   Assessment   Prognosis Guarded   Problem List Decreased strength;Decreased range of motion;Decreased endurance; Impaired balance;Decreased mobility; Decreased cognition; Impaired judgement;Decreased safety awareness;Pain Assessment Pt is progressing with endurance ambulating within the room with RW and Mod (A) x1  He still presents with overall weakness and cognitive and balance deficits requiring Mod (A) x 2 for bed mobility and transfers  Therapeutic exercises were performed this session to work on LE strengthen and coordination as well as dynamic sitting balance  Pt was able to perform seated exercises with (S)  It is recommended the pt continue skilled therapy while in the hospital and rehab is recommended upon D/C when medically cleared; PT will follow  Barriers to Discharge Inaccessible home environment;Decreased caregiver support   Goals   Patient Goals pt reports wanting rectal tube out   STG Expiration Date 12/09/22   PT Treatment Day 9   Plan   Treatment/Interventions Functional transfer training;LE strengthening/ROM; Therapeutic exercise; Endurance training;Cognitive reorientation; Bed mobility;Gait training; Compensatory technique education;Spoke to nursing;OT   Progress Slow progress, cognitive deficits   PT Frequency 2-3x/wk   Recommendation   PT Discharge Recommendation Post acute rehabilitation services   Equipment Recommended [facility] Recommended Wheeled walker   AM-PAC Basic Mobility Inpatient   Turning in Bed Without Bedrails 1   Lying on Back to Sitting on Edge of Flat Bed 1   Moving Bed to Chair 1   Standing Up From Chair 1   Walk in Room 1   Climb 3-5 Stairs 1   Basic Mobility Inpatient Raw Score 6   Turning Head Towards Sound 3   Follow Simple Instructions 3   Low Function Basic Mobility Raw Score 12   Low Function Basic Mobility Standardized Score 18 33   Highest Level Of Mobility   JH-HLM Goal 2: Bed activities/Dependent transfer   JH-HLM Achieved 6: Walk 10 steps or more   Education   Education Provided Mobility training;Home exercise program;Assistive device   Patient Reinforcement needed   End of Consult   Patient Position at End of Consult Bedside chair;Bed/Chair alarm activated; All needs within reach     [employer], SPT

## 2022-11-29 LAB
ANION GAP SERPL CALCULATED.3IONS-SCNC: 5 MMOL/L (ref 4–13)
BASOPHILS # BLD AUTO: 0.01 THOUSANDS/ÂΜL (ref 0–0.1)
BASOPHILS NFR BLD AUTO: 0 % (ref 0–1)
BUN SERPL-MCNC: 8 MG/DL (ref 5–25)
CALCIUM SERPL-MCNC: 8.1 MG/DL (ref 8.3–10.1)
CHLORIDE SERPL-SCNC: 113 MMOL/L (ref 96–108)
CO2 SERPL-SCNC: 22 MMOL/L (ref 21–32)
CREAT SERPL-MCNC: 0.47 MG/DL (ref 0.6–1.3)
EOSINOPHIL # BLD AUTO: 0.1 THOUSAND/ÂΜL (ref 0–0.61)
EOSINOPHIL NFR BLD AUTO: 2 % (ref 0–6)
ERYTHROCYTE [DISTWIDTH] IN BLOOD BY AUTOMATED COUNT: 17.2 % (ref 11.6–15.1)
G LAMBLIA AG STL QL IA: NEGATIVE
GFR SERPL CREATININE-BSD FRML MDRD: 119 ML/MIN/1.73SQ M
GLUCOSE SERPL-MCNC: 103 MG/DL (ref 65–140)
HCT VFR BLD AUTO: 33.9 % (ref 36.5–49.3)
HGB BLD-MCNC: 11.1 G/DL (ref 12–17)
IMM GRANULOCYTES # BLD AUTO: 0.03 THOUSAND/UL (ref 0–0.2)
IMM GRANULOCYTES NFR BLD AUTO: 1 % (ref 0–2)
INR PPP: 1.32 (ref 0.84–1.19)
LYMPHOCYTES # BLD AUTO: 0.69 THOUSANDS/ÂΜL (ref 0.6–4.47)
LYMPHOCYTES NFR BLD AUTO: 11 % (ref 14–44)
MAGNESIUM SERPL-MCNC: 2.1 MG/DL (ref 1.6–2.6)
MCH RBC QN AUTO: 32.1 PG (ref 26.8–34.3)
MCHC RBC AUTO-ENTMCNC: 32.7 G/DL (ref 31.4–37.4)
MCV RBC AUTO: 98 FL (ref 82–98)
MONOCYTES # BLD AUTO: 0.42 THOUSAND/ÂΜL (ref 0.17–1.22)
MONOCYTES NFR BLD AUTO: 7 % (ref 4–12)
NEUTROPHILS # BLD AUTO: 5.03 THOUSANDS/ÂΜL (ref 1.85–7.62)
NEUTS SEG NFR BLD AUTO: 79 % (ref 43–75)
NRBC BLD AUTO-RTO: 0 /100 WBCS
PHOSPHATE SERPL-MCNC: 3 MG/DL (ref 2.3–4.1)
PLATELET # BLD AUTO: 151 THOUSANDS/UL (ref 149–390)
PMV BLD AUTO: 10.1 FL (ref 8.9–12.7)
POTASSIUM SERPL-SCNC: 3.3 MMOL/L (ref 3.5–5.3)
PROTHROMBIN TIME: 16.6 SECONDS (ref 11.6–14.5)
RBC # BLD AUTO: 3.46 MILLION/UL (ref 3.88–5.62)
SODIUM SERPL-SCNC: 140 MMOL/L (ref 135–147)
WBC # BLD AUTO: 6.28 THOUSAND/UL (ref 4.31–10.16)

## 2022-11-29 RX ORDER — POTASSIUM CHLORIDE AND SODIUM CHLORIDE 900; 300 MG/100ML; MG/100ML
50 INJECTION, SOLUTION INTRAVENOUS CONTINUOUS
Status: DISPENSED | OUTPATIENT
Start: 2022-11-29 | End: 2022-12-04

## 2022-11-29 RX ORDER — POTASSIUM CHLORIDE 14.9 MG/ML
20 INJECTION INTRAVENOUS ONCE
Status: COMPLETED | OUTPATIENT
Start: 2022-11-29 | End: 2022-11-29

## 2022-11-29 RX ADMIN — THIAMINE HCL TAB 100 MG 100 MG: 100 TAB at 09:25

## 2022-11-29 RX ADMIN — ESCITALOPRAM OXALATE 10 MG: 10 TABLET ORAL at 09:24

## 2022-11-29 RX ADMIN — POTASSIUM CHLORIDE 40 MEQ: 1500 TABLET, EXTENDED RELEASE ORAL at 09:24

## 2022-11-29 RX ADMIN — ENOXAPARIN SODIUM 90 MG: 100 INJECTION SUBCUTANEOUS at 22:21

## 2022-11-29 RX ADMIN — POTASSIUM CHLORIDE AND SODIUM CHLORIDE 50 ML/HR: 900; 300 INJECTION, SOLUTION INTRAVENOUS at 11:39

## 2022-11-29 RX ADMIN — POTASSIUM CHLORIDE 20 MEQ: 14.9 INJECTION, SOLUTION INTRAVENOUS at 11:35

## 2022-11-29 RX ADMIN — BUSPIRONE HYDROCHLORIDE 5 MG: 5 TABLET ORAL at 17:00

## 2022-11-29 RX ADMIN — METOPROLOL SUCCINATE 100 MG: 100 TABLET, EXTENDED RELEASE ORAL at 09:25

## 2022-11-29 RX ADMIN — CYANOCOBALAMIN TAB 500 MCG 500 MCG: 500 TAB at 09:24

## 2022-11-29 RX ADMIN — BUSPIRONE HYDROCHLORIDE 5 MG: 5 TABLET ORAL at 09:25

## 2022-11-29 RX ADMIN — LORAZEPAM 0.5 MG: 0.5 TABLET ORAL at 09:24

## 2022-11-29 RX ADMIN — LORAZEPAM 0.5 MG: 0.5 TABLET ORAL at 17:00

## 2022-11-29 RX ADMIN — FLUTICASONE PROPIONATE 1 SPRAY: 50 SPRAY, METERED NASAL at 09:26

## 2022-11-29 RX ADMIN — FOLIC ACID 1 MG: 1 TABLET ORAL at 09:24

## 2022-11-29 RX ADMIN — ENOXAPARIN SODIUM 90 MG: 100 INJECTION SUBCUTANEOUS at 09:25

## 2022-11-29 RX ADMIN — POTASSIUM CHLORIDE 20 MEQ: 14.9 INJECTION, SOLUTION INTRAVENOUS at 09:31

## 2022-11-29 RX ADMIN — POTASSIUM CHLORIDE 40 MEQ: 1500 TABLET, EXTENDED RELEASE ORAL at 17:00

## 2022-11-29 RX ADMIN — B-COMPLEX W/ C & FOLIC ACID TAB 1 TABLET: TAB at 09:24

## 2022-11-29 RX ADMIN — WARFARIN SODIUM 7.5 MG: 7.5 TABLET ORAL at 17:00

## 2022-11-29 NOTE — WOUND OSTOMY CARE
Progress Note - Wound   [place] Gravely 58 y o  male MRN: [id number]  Unit/Bed#: [facility] 907-01 Encounter: [id number]        Assessment:   Patient is seen for wound care follow-up  Patient on p-500 specialty mattress and is a max assist for turning and repositioning  Dependent for call care needs  Stool managed by internal fecal management system and bladder managed by external urinary system  B/L heels remain dry, intact, and ashley with no skin loss or wounds present  Continue with preventative hydraguard  Findings:  1  Right elbow: partial thickness skin loss with a pink, blanchable wound bed  Reena-wound is fragile  Decreased in length and width  Scant sanguinous drainage noted  Continue with allevyn foam dressing     2  HA Evolving DTPI: irregular in shape with scattered areas of full thickness skin loss  Wound bed is 70% light purple non-blanchable erythema and 30% yellow slough tissue  Reena-wound is dry with scar tissue noted around site  Scant serosanguineous drainage  Recommend continuing with triad and fecal management system           No induration, fluctuance, odor, warmth/temperature differences, redness, or purulence noted to the above noted wounds and skin areas assessed  New dressings applied per orders listed below  Patient tolerated well- no s/s of non-verbal pain or discomfort observed during the encounter  Bedside nurse aware of plan of care  See flow sheets for more detailed assessment findings  Orders listed below and wound care will continue to follow, call or tiger text with questions  Skin Care Plan:   1-Apply TRIAD Paste to Sacro-Buttocks Wound TID and PRN episodes of incontinence   2-Turn/reposition q2h or when medically stable for pressure re-distribution on skin   3-Elevate heels to offload pressure  4-Moisturize skin daily with skin nourishing cream   5-Ehob cushion in chair when out of bed  6-Preventative hydraguard to bilateral heels BID and PRN     7-Right Elbow: Cleanse area and pat dry  Cover with small clover allevyn foam dressing  Jose M with T for Treatment  Change every other day or PRN    8-P-500 Specialty mattress ordered for patient       WOUNDS:  Wound 11/06/22  Buttocks Left (Active)   Wound Image   11/29/22 0936   Wound Description Light purple;Non-blanchable erythema;Slough; Yellow 11/29/22 0936   Pressure Injury Stage DTPI 11/29/22 0936   Reena-wound Assessment Dry;Scar Tissue 11/29/22 0936   Wound Length (cm) 8 cm 11/29/22 0936   Wound Width (cm) 4 cm 11/29/22 0936   Wound Depth (cm) 0 2 cm 11/29/22 0936   Wound Surface Area (cm^2) 32 cm^2 11/29/22 0936   Wound Volume (cm^3) 6 4 cm^3 11/29/22 0936   Calculated Wound Volume (cm^3) 6 4 cm^3 11/29/22 0936   Change in Wound Size % -6300 11/29/22 0936   Tunneling 0 cm 11/29/22 0936   Tunneling in depth located at 0 11/29/22 0936   Undermining 0 11/29/22 0936   Undermining is depth extending from 0 11/29/22 0936   Wound Site Closure REBECCA 11/29/22 0936   Drainage Amount Scant 11/29/22 0936   Drainage Description Serosanguineous 11/29/22 0936   Non-staged Wound Description Full thickness 11/29/22 0936   Treatments Cleansed;Site care 11/29/22 0936   Dressing Other (Comment) 11/29/22 0936   Wound packed? No 11/29/22 0936   Packing- # removed 0 11/29/22 0936   Packing- # inserted 0 11/29/22 0936   Dressing Changed Changed 11/29/22 0936   Patient Tolerance Tolerated well 11/29/22 0936   Dressing Status Intact 11/29/22 0936       Wound 11/18/22 Skin tear Arm Posterior;Proximal;Right; Inferior (Active)   Wound Image   11/29/22 0936   Wound Description Bleeding;Pink 11/29/22 0936   Reena-wound Assessment Fragile 11/29/22 0936   Wound Length (cm) 0 5 cm 11/29/22 0936   Wound Width (cm) 0 8 cm 11/29/22 0936   Wound Depth (cm) 0 1 cm 11/29/22 0936   Wound Surface Area (cm^2) 0 4 cm^2 11/29/22 0936   Wound Volume (cm^3) 0 04 cm^3 11/29/22 0936   Calculated Wound Volume (cm^3) 0 04 cm^3 11/29/22 0936   Change in Wound Size % 91 11 11/29/22 0936   Drainage Amount Scant 11/29/22 0936   Drainage Description Sanguineous 11/29/22 0936   Non-staged Wound Description Partial thickness 11/29/22 0936   Treatments Cleansed;Site care 11/29/22 0936   Dressing Foam, Silicon (eg  Allevyn, etc) 11/29/22 0936   Wound packed? No 11/29/22 0936   Dressing Changed Changed 11/29/22 0936   Patient Tolerance Tolerated well 11/29/22 0936   Dressing Status Clean;Dry; Intact 11/29/22 0936                Petra Mcgarry RN, BSN

## 2022-11-29 NOTE — PLAN OF CARE
Problem: PAIN - ADULT  Goal: Verbalizes/displays adequate comfort level or baseline comfort level  Description: Interventions:  - Encourage patient to monitor pain and request assistance  - Assess pain using appropriate pain scale  - Administer analgesics based on type and severity of pain and evaluate response  - Implement non-pharmacological measures as appropriate and evaluate response  - Consider cultural and social influences on pain and pain management  - Notify physician/advanced practitioner if interventions unsuccessful or patient reports new pain  Outcome: Progressing     Problem: Prexisting or High Potential for Compromised Skin Integrity  Goal: Skin integrity is maintained or improved  Description: INTERVENTIONS:  - Identify patients at risk for skin breakdown  - Assess and monitor skin integrity  - Assess and monitor nutrition and hydration status  - Monitor labs   - Assess for incontinence   - Turn and reposition patient  - Assist with mobility/ambulation  - Relieve pressure over bony prominences  - Avoid friction and shearing  - Provide appropriate hygiene as needed including keeping skin clean and dry  - Evaluate need for skin moisturizer/barrier cream  - Collaborate with interdisciplinary team   - Patient/family teaching  - Consider wound care consult   Outcome: Progressing     Problem: Nutrition/Hydration-ADULT  Goal: Nutrient/Hydration intake appropriate for improving, restoring or maintaining nutritional needs  Description: Monitor and assess patient's nutrition/hydration status for malnutrition  Collaborate with interdisciplinary team and initiate plan and interventions as ordered  Monitor patient's weight and dietary intake as ordered or per policy  Utilize nutrition screening tool and intervene as necessary  Determine patient's food preferences and provide high-protein, high-caloric foods as appropriate       INTERVENTIONS:  - Monitor oral intake, urinary output, labs, and treatment plans  - Assess nutrition and hydration status and recommend course of action  - Evaluate amount of meals eaten  - Assist patient with eating if necessary   - Allow adequate time for meals  - Recommend/ encourage appropriate diets, oral nutritional supplements, and vitamin/mineral supplements  - Order, calculate, and assess calorie counts as needed  - Recommend, monitor, and adjust tube feedings and TPN/PPN based on assessed needs  - Assess need for intravenous fluids  - Provide specific nutrition/hydration education as appropriate  - Include patient/family/caregiver in decisions related to nutrition  Outcome: Progressing

## 2022-11-29 NOTE — ASSESSMENT & PLAN NOTE
Per chart review patient has had low TSH and elevated free T4 in the past   Most recent labs during this admission with TSH 0 282 and free t4 1 56  Thyroid U/S during last admission at [address] [place] [address] was unremarkable   Evaluated by endocrinology in the past during previous admission at [address] [place] [address] (Oct 22) and they recommend get repeat TFT, TSI and thyroid abs   Appreciate endocrinology evaluation this hospital course -> likely subclinical hyperthyroidism -> repeat thyroid testing in 4-6 weeks once acute medical issues stabilized

## 2022-11-29 NOTE — ASSESSMENT & PLAN NOTE
Medications reviewed -> Senokot previously discontinued  Diarrhea has been waxing/waning but persistent over the last several days despite PRN Imodium ordered previously  Continue IV fluid hydration monitor/replete electrolyte deficiencies if present  Rectal tube remains in place  Appreciated gastroenterology input ->c diff negative, giardia negative, ova and parasite still pending -> hold Imodium until infectious workup negative

## 2022-11-29 NOTE — PROGRESS NOTES
[facility]  Progress Note - Marlyn Adams 1960, 58 y o  male MRN: [id number]  [facility] Encounter: [id number]  Primary Care Provider: Ballard Bence, MD   Date and time admitted to hospital: 11/3/2022  3:28 PM    * SHIMON (generalized anxiety disorder)  Assessment & Plan  Previous admission from the end of September until the end of October at [address] [place] [address] in which he was hospitalized for over a month for severe anxiety and inability to care for self  Per sister reportedly was able to ambulate prior to the hospitalization but after the hospitalization patient needing assistance with ambulation at home  During hospitalization there patient developed hyponatremia during his course of stay and Zoloft that had been started was stopped due to hyponatremia  Recently discharged from inpatient psychiatry with mirtazapine and aripiprazole, discussed resumption of thesewith the patient but he reports excess sedation with these   D/w psychiatry earlier this week started buspar 5mg BID, and lexapro  He does admit to depressive symptoms and anxiety, discussing certain topics with him result is worsening tremor  Monitor response to antidepressants  Reconsulted psychiatry for re-evaluation / possible IP psych admission for [facility] psychiatry on 11/18 recommended inpatient psychiatry admission, in-person evaluation with our on-site psychiatrist 11/21 did not feel this was necessary  Recommended increasing escitalopram, making lorazepam scheduled as patient does not ask for it but his anxiety might benefit from it, and continuing BusPar  Overall plan is to help patient improve his anxiety so that he can get to rehab    Per updated discussion w/ psychiatry -> continue current Lexapro/Buspar/Ativan regimen -> okay for discharge to skilled rehab from a psychiatric point of view -> awaiting placement, however, patient likely undergoing "OPTIONS" process prior to skilled rehab placement due to underlying psychiatric history    Diarrhea  Assessment & Plan  Medications reviewed -> Senokot previously discontinued  Diarrhea has been waxing/waning but persistent over the last several days despite PRN Imodium ordered previously  Continue IV fluid hydration monitor/replete electrolyte deficiencies if present  Rectal tube remains in place  Appreciated gastroenterology input ->c diff negative, giardia negative, ova and parasite still pending -> hold Imodium until infectious workup negative    Cognitive impairment  Assessment & Plan  Per neuropsychology evaluation on 11/14, patient does NOT have capacity to make medical decisions   Patient also has some level of cognitive impairment   B12 borderline low -> s/p one time intramuscular B12 shot -> continue supplementation  Patient intermittently refusing medications per nursing    Hyperthyroidism  Assessment & Plan  Per chart review patient has had low TSH and elevated free T4 in the past   Most recent labs during this admission with TSH 0 282 and free t4 1 56  Thyroid U/S during last admission at [facility] was unremarkable   Evaluated by endocrinology in the past during previous admission at [facility] (Oct 22) and they recommend get repeat TFT, TSI and thyroid abs   Appreciate endocrinology evaluation this hospital course -> likely subclinical hyperthyroidism -> repeat thyroid testing in 4-6 weeks once acute medical issues stabilized    Severe protein-calorie malnutrition   Assessment & Plan  BMI of 28 33 currently in the setting of decreased appetite/oral intake and unintentional weight loss  Continue nutritional supplementation w/ meals    Hypernatremia  Assessment & Plan  On presentation: sodium 151 -> progressively improved with normalization on 11/25  Appreciate nephrology input  Encourage increased oral intake  Continue serum sodium monitoring    Multiple electrolyte abnormalities  Assessment & Plan  Monitor/replete serum phosphate/potassium/magnesium as necessary  Consider insensible losses in the setting of intractable diarrhea    History of CVA (cerebrovascular accident)  Assessment & Plan  H/o CVA x 3 in setting of antiphospholipid syndrome  On Coumadin for anticoagulation  CT head in ER: No acute intracranial abnormality   Encephalomalacia involving the right occipital lobe and bilateral cerebelli   Grossly stable appearance of periventricular hypoattenuation compared to MRI brain 8/29/2022, likely representing chronic microvascular ischemic changes  Appreciate PT/OT input ->  plan for skilled rehab placement once medically stabilized    Tremor of right hand  Assessment & Plan  Outpatient follow-up w/ neurology - consider sequela of prior strokes ? ? Antiphospholipid antibody with hypercoagulable state   Assessment & Plan  Continue Coumadin regimen - had ischemic stroke in the past despite Eliquis, hence, deemed Eliquis failure  Due to subtherapeutic INR, Coumadin being bridged w/ therapeutic Lovenox currently    Atrial fibrillation  Assessment & Plan  Rate controlled on Toprol-XL  Continue Coumadin for anticoagulation (w/ therapeutic Lovenox bridge until INR at goal)    Abdominal distension-resolved as of 11/16/2022  Assessment & Plan  · Possibly the cause of his poor oral intake  · Reports improved distention and had a large BM recently  · No signs of obstruction on CT abdomen  · Regular diet, monitor for tolerance    Dehydration-resolved as of 11/16/2022  Assessment & Plan  Due to poor po intake, decreased appetite, possible constipation or fecal impaction  Improved now  Monitor off IV hydration  Monitor PO intake - patient has been eating all his meals     Sepsis (HCC)-resolved as of 11/10/2022  Assessment & Plan  -WBC 14 K, heart rate 114,  lactic acid 2 3 on admission  -chest x-ray with no obvious focal infiltrate; daughter reports diffuse weakness and at times patient coughing after eating  Diet modified by speech therapy    -urine with foul odor-urinalysis grossly negative    Plan   Patient is afebrile, with stable procalcitonin, negative UA and negative x-ray  Could consider silent aspiration as potential source of infection; however, imaging appears negative currently  S/p 3 days of ceftriaxone    DAYAN (acute kidney injury) (HCC)-resolved as of 11/6/2022  Assessment & Plan  -see rhabdomyolysis section    Rhabdomyolysis-resolved as of 11/9/2022  Assessment & Plan  -CPK > 7000 on admission with DAYAN  -in setting of minimal ambulation and has been going up the stairs by crawling in using his buttocks    CPK significant improved   DAYAN resolved     Supratherapeutic INR-resolved as of 11/9/2022  Assessment & Plan  Increased increased to 9 has history of antiphospholipid syndrome and AFib  -no evidence of bleeding  Plan  · > as INR mildly elevated and no source of bleeding, will hold off giving any reversal agents of supratherapeutic INR especially given patient's history of antiphospholipid syndrome and history of 3 previous CVA as well as having ischemic   · likely etiology of of elevated INR is multifactorial   Suspect poor nutrition in conjunction with vitamin K antagonism of Coumadin as etiology of elevated INR  Patient INR trending downward with holding of Coumadin  Given INR is less than 10 and history of hypercoagulable state in the setting of stroke history would prefer to monitor and trend off of Coumadin  continue warfarin , bridge with lovenox until therapeutic          VTE Pharmacologic Prophylaxis: VTE Score: 3 Moderate Risk (Score 3-4) - Pharmacological DVT Prophylaxis Ordered: enoxaparin (Lovenox)  Patient Centered Rounds: I performed bedside rounds with nursing staff today  Discussions with Specialists or Other Care Team Provider: case management    Education and Discussions with Family / Patient: Updated  (sister) via phone  Time Spent for Care: 30 minutes   More than 50% of total time spent on counseling and coordination of care as described above  Current Length of Stay: 26 day(s)  Current Patient Status: Inpatient   Certification Statement: The patient will continue to require additional inpatient hospital stay due to diarrhea  Discharge Plan: Anticipate discharge in 48-72 hrs to rehab facility  Code Status: Level 3 - DNAR and DNI     Subjective:   Patient seen and examined at bedside  No acute events overnight  Patient currently has no acute complaints  Objective:     Vitals:   Temp (24hrs), Av 7 °F (36 5 °C), Min:97 5 °F (36 4 °C), Max:97 9 °F (36 6 °C)    Temp:  [97 5 °F (36 4 °C)-97 9 °F (36 6 °C)] 97 7 °F (36 5 °C)  HR:  [86] 86  Resp:  [16] 16  BP: (126-164)/() 129/83  SpO2:  [97 %] 97 %  Body mass index is 28 33 kg/m²  Input and Output Summary (last 24 hours): Intake/Output Summary (Last 24 hours) at 2022 1724  Last data filed at 2022 1500  Gross per 24 hour   Intake 1368 33 ml   Output 3250 ml   Net -1881 67 ml       Physical Exam:   Physical Exam  Vitals reviewed  HENT:      Head: Normocephalic and atraumatic  Right Ear: External ear normal       Left Ear: External ear normal       Nose: Nose normal       Mouth/Throat:      Mouth: Mucous membranes are moist       Pharynx: Oropharynx is clear  Eyes:      Extraocular Movements: Extraocular movements intact  Cardiovascular:      Rate and Rhythm: Normal rate and regular rhythm  Heart sounds: Normal heart sounds  Pulmonary:      Effort: Pulmonary effort is normal       Breath sounds: Normal breath sounds  Abdominal:      General: Abdomen is flat  Palpations: Abdomen is soft  Tenderness: There is no abdominal tenderness  Musculoskeletal:      Cervical back: Normal range of motion  Right lower leg: No edema  Left lower leg: No edema  Skin:     General: Skin is warm and dry  Neurological:      Mental Status: He is alert  Mental status is at baseline        Comments: Generalized weakness from deconditioning   Psychiatric:         Mood and Affect: Mood normal          Behavior: Behavior normal           Additional Data:     Labs:  Results from last 7 days   Lab Units 11/29/22  0455   WBC Thousand/uL 6 28   HEMOGLOBIN g/dL 11 1*   HEMATOCRIT % 33 9*   PLATELETS Thousands/uL 151   NEUTROS PCT % 79*   LYMPHS PCT % 11*   MONOS PCT % 7   EOS PCT % 2     Results from last 7 days   Lab Units 11/29/22  0455   SODIUM mmol/L 140   POTASSIUM mmol/L 3 3*   CHLORIDE mmol/L 113*   CO2 mmol/L 22   BUN mg/dL 8   CREATININE mg/dL 0 47*   ANION GAP mmol/L 5   CALCIUM mg/dL 8 1*   GLUCOSE RANDOM mg/dL 103     Results from last 7 days   Lab Units 11/29/22  0455   INR  1 32*             Results from last 7 days   Lab Units 11/23/22  1107   PROCALCITONIN ng/ml 0 11       Lines/Drains:  Invasive Devices     Peripheral Intravenous Line  Duration           Peripheral IV 11/27/22 Left;Ventral (anterior) Wrist 2 days          Drain  Duration           Rectal Tube With balloon 5 days                      Imaging: Reviewed radiology reports from this admission including: CT head    Recent Cultures (last 7 days):   Results from last 7 days   Lab Units 11/27/22  1812 11/23/22  1122 11/23/22  1106   BLOOD CULTURE   --  No Growth After 5 Days  No Growth After 5 Days     C DIFF TOXIN B BY PCR  Negative  --   --        Last 24 Hours Medication List:   Current Facility-Administered Medications   Medication Dose Route Frequency Provider Last Rate   • busPIRone  5 mg Oral BID True Cohen MD     • vitamin B-12  500 mcg Oral Daily Brandoit Kaylie, DO     • cyanocobalamin  1,000 mcg Intramuscular Q30 Days Tana Lott, DO     • enoxaparin  1 mg/kg Subcutaneous Q12H Crystal Maurer MD     • escitalopram  10 mg Oral Daily Kamini Salter MD     • fluticasone  1 spray Each Nare Daily Tana Lott, DO     • folic acid  1 mg Oral Daily True Cohen MD     • loperamide  2 mg Oral 4x Daily PRN Ritu Anthony MD     • LORazepam  0 5 mg Oral BID Luna Jean-Baptiste MD     • metoprolol  5 mg Intravenous Q6H PRN Ana Abbasi MD     • metoprolol succinate  100 mg Oral Daily Luna Jean-Baptiste MD     • multivitamin stress formula  1 tablet Oral Daily Tito Matta MD     • pantoprazole  40 mg Oral Early Morning Tana Lott DO     • potassium chloride  40 mEq Oral BID Luna Jean-Baptiste MD     • sodium chloride  1 spray Each Nare Q1H PRN Luna Jean-Baptiste MD     • sodium chloride 0 9 % with KCl 40 mEq/L  50 mL/hr Intravenous Continuous Gustabo Mcgee DO 50 mL/hr (11/29/22 1139)   • thiamine  100 mg Oral Daily Tito Matta MD     • warfarin  7 5 mg Oral QPM Ana Abbasi MD          Today, Patient Was Seen By: Kamilah Agee DO    **Please Note: This note may have been constructed using a voice recognition system  **

## 2022-11-29 NOTE — ASSESSMENT & PLAN NOTE
Previous admission from the end of September until the end of October at [facility] in which he was hospitalized for over a month for severe anxiety and inability to care for self  Per sister reportedly was able to ambulate prior to the hospitalization but after the hospitalization patient needing assistance with ambulation at home  During hospitalization there patient developed hyponatremia during his course of stay and Zoloft that had been started was stopped due to hyponatremia  Recently discharged from inpatient psychiatry with mirtazapine and aripiprazole, discussed resumption of thesewith the patient but he reports excess sedation with these   D/w psychiatry earlier this week started buspar 5mg BID, and lexapro  He does admit to depressive symptoms and anxiety, discussing certain topics with him result is worsening tremor  Monitor response to antidepressants  Reconsulted psychiatry for re-evaluation / possible IP psych admission for [address] [place] [address] on 11/18 recommended inpatient psychiatry admission, in-person evaluation with our on-site psychiatrist 11/21 did not feel this was necessary  Recommended increasing escitalopram, making lorazepam scheduled as patient does not ask for it but his anxiety might benefit from it, and continuing BusPar  Overall plan is to help patient improve his anxiety so that he can get to rehab    Per updated discussion w/ psychiatry -> continue current Lexapro/Buspar/Ativan regimen -> okay for discharge to skilled rehab from a psychiatric point of view -> awaiting placement, however, patient likely undergoing "OPTIONS" process prior to skilled rehab placement due to underlying psychiatric history

## 2022-11-29 NOTE — PLAN OF CARE
Problem: Potential for Falls  Goal: Patient will remain free of falls  Description: INTERVENTIONS:  - Educate patient/family on patient safety including physical limitations  - Instruct patient to call for assistance with activity   - Consult OT/PT to assist with strengthening/mobility   - Keep Call bell within reach  - Keep bed low and locked with side rails adjusted as appropriate  - Keep care items and personal belongings within reach  - Initiate and maintain comfort rounds  - Make Fall Risk Sign visible to staff  - Offer Toileting every  Hours, in advance of need  - Initiate/Maintain alarm  - Obtain necessary fall risk management equipment:   - Apply yellow socks and bracelet for high fall risk patients  - Consider moving patient to room near nurses station  Outcome: Progressing     Problem: PAIN - ADULT  Goal: Verbalizes/displays adequate comfort level or baseline comfort level  Description: Interventions:  - Encourage patient to monitor pain and request assistance  - Assess pain using appropriate pain scale  - Administer analgesics based on type and severity of pain and evaluate response  - Implement non-pharmacological measures as appropriate and evaluate response  - Consider cultural and social influences on pain and pain management  - Notify physician/advanced practitioner if interventions unsuccessful or patient reports new pain  Outcome: Progressing     Problem: SAFETY ADULT  Goal: Patient will remain free of falls  Description: INTERVENTIONS:  - Educate patient/family on patient safety including physical limitations  - Instruct patient to call for assistance with activity   - Consult OT/PT to assist with strengthening/mobility   - Keep Call bell within reach  - Keep bed low and locked with side rails adjusted as appropriate  - Keep care items and personal belongings within reach  - Initiate and maintain comfort rounds  - Make Fall Risk Sign visible to staff  - Offer Toileting every  Hours, in advance of need  - Initiate/Maintain alarm  - Obtain necessary fall risk management equipment:   - Apply yellow socks and bracelet for high fall risk patients  - Consider moving patient to room near nurses station  Outcome: Progressing  Goal: Maintain or return to baseline ADL function  Description: INTERVENTIONS:  -  Assess patient's ability to carry out ADLs; assess patient's baseline for ADL function and identify physical deficits which impact ability to perform ADLs (bathing, care of mouth/teeth, toileting, grooming, dressing, etc )  - Assess/evaluate cause of self-care deficits   - Assess range of motion  - Assess patient's mobility; develop plan if impaired  - Assess patient's need for assistive devices and provide as appropriate  - Encourage maximum independence but intervene and supervise when necessary  - Involve family in performance of ADLs  - Assess for home care needs following discharge   - Consider OT consult to assist with ADL evaluation and planning for discharge  - Provide patient education as appropriate  Outcome: Progressing  Goal: Maintains/Returns to pre admission functional level  Description: INTERVENTIONS:  - Perform BMAT or MOVE assessment daily    - Set and communicate daily mobility goal to care team and patient/family/caregiver  - Collaborate with rehabilitation services on mobility goals if consulted  - Perform Range of Motion  times a day  - Reposition patient every  hours    - Dangle patient  times a day  - Stand patient  times a day  - Ambulate patient  times a day  - Out of bed to chair  times a day   - Out of bed for meals  times a day  - Out of bed for toileting  - Record patient progress and toleration of activity level   Outcome: Progressing     Problem: MOBILITY - ADULT  Goal: Maintain or return to baseline ADL function  Description: INTERVENTIONS:  -  Assess patient's ability to carry out ADLs; assess patient's baseline for ADL function and identify physical deficits which impact ability to perform ADLs (bathing, care of mouth/teeth, toileting, grooming, dressing, etc )  - Assess/evaluate cause of self-care deficits   - Assess range of motion  - Assess patient's mobility; develop plan if impaired  - Assess patient's need for assistive devices and provide as appropriate  - Encourage maximum independence but intervene and supervise when necessary  - Involve family in performance of ADLs  - Assess for home care needs following discharge   - Consider OT consult to assist with ADL evaluation and planning for discharge  - Provide patient education as appropriate  Outcome: Progressing  Goal: Maintains/Returns to pre admission functional level  Description: INTERVENTIONS:  - Perform BMAT or MOVE assessment daily    - Set and communicate daily mobility goal to care team and patient/family/caregiver  - Collaborate with rehabilitation services on mobility goals if consulted  - Perform Range of Motion  times a day  - Reposition patient every  hours    - Dangle patient  times a day  - Stand patient  times a day  - Ambulate patient  times a day  - Out of bed to chair  times a day   - Out of bed for meri times a day  - Out of bed for toileting  - Record patient progress and toleration of activity level   Outcome: Progressing     Problem: Prexisting or High Potential for Compromised Skin Integrity  Goal: Skin integrity is maintained or improved  Description: INTERVENTIONS:  - Identify patients at risk for skin breakdown  - Assess and monitor skin integrity  - Assess and monitor nutrition and hydration status  - Monitor labs   - Assess for incontinence   - Turn and reposition patient  - Assist with mobility/ambulation  - Relieve pressure over bony prominences  - Avoid friction and shearing  - Provide appropriate hygiene as needed including keeping skin clean and dry  - Evaluate need for skin moisturizer/barrier cream  - Collaborate with interdisciplinary team   - Patient/family teaching  - Consider wound care consult   Outcome: Progressing     Problem: Nutrition/Hydration-ADULT  Goal: Nutrient/Hydration intake appropriate for improving, restoring or maintaining nutritional needs  Description: Monitor and assess patient's nutrition/hydration status for malnutrition  Collaborate with interdisciplinary team and initiate plan and interventions as ordered  Monitor patient's weight and dietary intake as ordered or per policy  Utilize nutrition screening tool and intervene as necessary  Determine patient's food preferences and provide high-protein, high-caloric foods as appropriate       INTERVENTIONS:  - Monitor oral intake, urinary output, labs, and treatment plans  - Assess nutrition and hydration status and recommend course of action  - Evaluate amount of meals eaten  - Assist patient with eating if necessary   - Allow adequate time for meals  - Recommend/ encourage appropriate diets, oral nutritional supplements, and vitamin/mineral supplements  - Order, calculate, and assess calorie counts as needed  - Recommend, monitor, and adjust tube feedings and TPN/PPN based on assessed needs  - Assess need for intravenous fluids  - Provide specific nutrition/hydration education as appropriate  - Include patient/family/caregiver in decisions related to nutrition  Outcome: Progressing

## 2022-11-30 ENCOUNTER — TELEPHONE (OUTPATIENT)
Dept: NEUROLOGY | Facility: CLINIC | Age: 62
End: 2022-11-30

## 2022-11-30 PROBLEM — E87.0 HYPERNATREMIA: Status: RESOLVED | Noted: 2022-11-04 | Resolved: 2022-11-30

## 2022-11-30 LAB
ALBUMIN SERPL BCP-MCNC: 2.4 G/DL (ref 3.5–5)
ANION GAP SERPL CALCULATED.3IONS-SCNC: 5 MMOL/L (ref 4–13)
BUN SERPL-MCNC: 6 MG/DL (ref 5–25)
CALCIUM ALBUM COR SERPL-MCNC: 9.8 MG/DL (ref 8.3–10.1)
CALCIUM SERPL-MCNC: 8.5 MG/DL (ref 8.3–10.1)
CHLORIDE SERPL-SCNC: 110 MMOL/L (ref 96–108)
CO2 SERPL-SCNC: 23 MMOL/L (ref 21–32)
CREAT SERPL-MCNC: 0.44 MG/DL (ref 0.6–1.3)
ERYTHROCYTE [DISTWIDTH] IN BLOOD BY AUTOMATED COUNT: 16.9 % (ref 11.6–15.1)
GFR SERPL CREATININE-BSD FRML MDRD: 122 ML/MIN/1.73SQ M
GLUCOSE SERPL-MCNC: 102 MG/DL (ref 65–140)
HCT VFR BLD AUTO: 34 % (ref 36.5–49.3)
HGB BLD-MCNC: 11.4 G/DL (ref 12–17)
INR PPP: 1.31 (ref 0.84–1.19)
MAGNESIUM SERPL-MCNC: 2 MG/DL (ref 1.6–2.6)
MCH RBC QN AUTO: 32.2 PG (ref 26.8–34.3)
MCHC RBC AUTO-ENTMCNC: 33.5 G/DL (ref 31.4–37.4)
MCV RBC AUTO: 96 FL (ref 82–98)
PHOSPHATE SERPL-MCNC: 2.3 MG/DL (ref 2.3–4.1)
PLATELET # BLD AUTO: 164 THOUSANDS/UL (ref 149–390)
PMV BLD AUTO: 9.9 FL (ref 8.9–12.7)
POTASSIUM SERPL-SCNC: 3.8 MMOL/L (ref 3.5–5.3)
PROTHROMBIN TIME: 16.5 SECONDS (ref 11.6–14.5)
RBC # BLD AUTO: 3.54 MILLION/UL (ref 3.88–5.62)
SODIUM SERPL-SCNC: 138 MMOL/L (ref 135–147)
WBC # BLD AUTO: 7.75 THOUSAND/UL (ref 4.31–10.16)

## 2022-11-30 RX ORDER — LOPERAMIDE HYDROCHLORIDE 2 MG/1
2 CAPSULE ORAL 3 TIMES DAILY
Status: DISCONTINUED | OUTPATIENT
Start: 2022-11-30 | End: 2022-12-01

## 2022-11-30 RX ORDER — WARFARIN SODIUM 5 MG/1
10 TABLET ORAL EVERY EVENING
Status: DISCONTINUED | OUTPATIENT
Start: 2022-12-01 | End: 2022-12-06

## 2022-11-30 RX ADMIN — BUSPIRONE HYDROCHLORIDE 5 MG: 5 TABLET ORAL at 10:34

## 2022-11-30 RX ADMIN — FLUTICASONE PROPIONATE 1 SPRAY: 50 SPRAY, METERED NASAL at 10:43

## 2022-11-30 RX ADMIN — ESCITALOPRAM OXALATE 10 MG: 10 TABLET ORAL at 10:34

## 2022-11-30 RX ADMIN — LOPERAMIDE HYDROCHLORIDE 2 MG: 2 CAPSULE ORAL at 22:51

## 2022-11-30 RX ADMIN — LORAZEPAM 0.5 MG: 0.5 TABLET ORAL at 17:11

## 2022-11-30 RX ADMIN — B-COMPLEX W/ C & FOLIC ACID TAB 1 TABLET: TAB at 10:34

## 2022-11-30 RX ADMIN — LORAZEPAM 0.5 MG: 0.5 TABLET ORAL at 10:34

## 2022-11-30 RX ADMIN — POTASSIUM & SODIUM PHOSPHATES POWDER PACK 280-160-250 MG 2 PACKET: 280-160-250 PACK at 22:51

## 2022-11-30 RX ADMIN — LOPERAMIDE HYDROCHLORIDE 2 MG: 2 CAPSULE ORAL at 10:34

## 2022-11-30 RX ADMIN — CYANOCOBALAMIN TAB 500 MCG 500 MCG: 500 TAB at 10:34

## 2022-11-30 RX ADMIN — THIAMINE HCL TAB 100 MG 100 MG: 100 TAB at 10:34

## 2022-11-30 RX ADMIN — ENOXAPARIN SODIUM 90 MG: 100 INJECTION SUBCUTANEOUS at 10:33

## 2022-11-30 RX ADMIN — METOPROLOL SUCCINATE 100 MG: 100 TABLET, EXTENDED RELEASE ORAL at 10:34

## 2022-11-30 RX ADMIN — FOLIC ACID 1 MG: 1 TABLET ORAL at 10:34

## 2022-11-30 RX ADMIN — WARFARIN SODIUM 7.5 MG: 7.5 TABLET ORAL at 17:11

## 2022-11-30 RX ADMIN — BUSPIRONE HYDROCHLORIDE 5 MG: 5 TABLET ORAL at 17:11

## 2022-11-30 RX ADMIN — POTASSIUM CHLORIDE 40 MEQ: 1500 TABLET, EXTENDED RELEASE ORAL at 17:11

## 2022-11-30 RX ADMIN — LOPERAMIDE HYDROCHLORIDE 2 MG: 2 CAPSULE ORAL at 17:11

## 2022-11-30 RX ADMIN — POTASSIUM CHLORIDE AND SODIUM CHLORIDE 50 ML/HR: 900; 300 INJECTION, SOLUTION INTRAVENOUS at 06:35

## 2022-11-30 RX ADMIN — ENOXAPARIN SODIUM 90 MG: 100 INJECTION SUBCUTANEOUS at 22:51

## 2022-11-30 NOTE — PHYSICAL THERAPY NOTE
PHYSICAL THERAPY NOTE          Patient Name: Lilly TRISTAN Date: 11/30/2022 11/30/22 0936   PT Last Visit   PT Visit Date 11/30/22   Note Type   Note Type Treatment   Pain Assessment   Pain Assessment Tool 0-10   Pain Score No Pain   Restrictions/Precautions   Weight Bearing Precautions Per Order No   Other Precautions Cognitive; Chair Alarm; Bed Alarm;Multiple lines; Fall Risk  (mcgregor, rectal tube)   General   Chart Reviewed Yes   Response to Previous Treatment Patient with no complaints from previous session  Family/Caregiver Present No   Cognition   Overall Cognitive Status Impaired   Arousal/Participation Responsive; Cooperative   Attention Attends with cues to redirect   Orientation Level Oriented to person;Oriented to place;Oriented to time   Following Commands Follows one step commands with increased time or repetition   Comments pt with improved responsiveness + command following this session  More alert + talkative (responds with short phrases)  Continues to demonstrate flat affect  Increased time required throughout session   Bed Mobility   Supine to Sit 3  Moderate assistance   Additional items Assist x 2;HOB elevated; Bedrails; Increased time required;Verbal cues   Sit to Supine Unable to assess   Additional Comments pt supine in bed upon arrival  Pt returned to sitting OOB in recliner with alarm on and all needs wihtin reach   Transfers   Sit to Stand 3  Moderate assistance   Additional items Assist x 2; Increased time required;Verbal cues   Stand to Sit 3  Moderate assistance   Additional items Assist x 2; Increased time required;Verbal cues   Additional Comments transfers with RW, cues for hand placement and sequencing   Ambulation/Elevation   Gait pattern Excessively slow; Short stride; Foward flexed;Decreased foot clearance; Inconsistent lucien;Narrow TEE;Shuffling   Gait Assistance 3  Moderate assist Additional items Assist x 2;Verbal cues; Tactile cues  (+ SBA of another for chair follow)   Assistive Device Rolling walker   Distance 5ft (seated rest) 10ft   Balance   Static Sitting Fair   Dynamic Sitting Fair -   Static Standing Poor +   Dynamic Standing Poor   Ambulatory Poor   Endurance Deficit   Endurance Deficit Yes   Activity Tolerance   Activity Tolerance Patient tolerated treatment well   Nurse Made Aware RN cleared pt to participate in therapy session   Exercises   Heelslides Sitting;Bilateral;10 reps;AROM  (with legs up on recliner)   Hip Abduction Sitting;Bilateral;10 reps;AROM   Hip Adduction Sitting;Bilateral;10 reps;AROM   Knee AROM Long Arc Quad Sitting;Bilateral;10 reps;AROM   Ankle Pumps Sitting;Bilateral;10 reps;AROM   Marching Sitting;Bilateral;10 reps;AROM   Balance training  pt sitting EOB ~15 min with occasional min A required to correct R  sided/ postioer LOB   Assessment   Prognosis Guarded   Problem List Decreased strength;Decreased range of motion;Decreased endurance; Impaired balance;Decreased mobility; Decreased cognition; Impaired judgement;Decreased safety awareness;Pain   Assessment Pt seen for PT treatment session this date  Therapy session focused on bed mobility, functional transfers, gait training, therapeutic exercise and sitting balance/ tolerance + endurance training in order to improve overall mobility and independence  Pt requires assist of 2 for all mobility performed this date  Pt much more responsive this therapy session, able to follow more commands  Pt requires mod Ax2 for bed mobility, functional transfers using RW and ambulation using RW + close chair follow  Pt increased ambulation distance to a total of 15 ft this date  Cues required too increase TEE + step length  Pt enjoyed going out into hallway and ambulating to window  Pt performed seated therex to b/l LEs without complainants; pt counted out loud to assist with tracking reps   Pt making slow but steady progress toward goals  Pt was left sitting OOB in recliner at the end of PT session with all needs in reach  Pt would benefit from continued PT services while in hospital to address remaining limitations  PT to continue to follow pt and recommends rehab upon d c  The patient's AM-PAC Basic Mobility Inpatient Short Form Raw Score is 7  A Raw score of less than or equal to 16 suggests the patient may benefit from discharge to post-acute rehabilitation services  Please also refer to the recommendation of the Physical Therapist for safe discharge planning  Barriers to Discharge Inaccessible home environment;Decreased caregiver support   Goals   Patient Goals pt reports wanting to get rid of his rectal tube   STG Expiration Date 12/09/22   PT Treatment Day 10   Plan   Treatment/Interventions Functional transfer training;LE strengthening/ROM; Endurance training;Patient/family training;Equipment eval/education; Bed mobility;Gait training;Spoke to nursing;Spoke to case management;OT   Progress Slow progress, decreased activity tolerance   PT Frequency 2-3x/wk   Recommendation   PT Discharge Recommendation Post acute rehabilitation services   AM-PAC Basic Mobility Inpatient   Turning in Bed Without Bedrails 2   Lying on Back to Sitting on Edge of Flat Bed 1   Moving Bed to Chair 1   Standing Up From Chair 1   Walk in Room 1   Climb 3-5 Stairs 1   Basic Mobility Inpatient Raw Score 7   Highest Level Of Mobility   JH-HLM Goal 2: Bed activities/Dependent transfer   JH-HLM Achieved 6: Walk 10 steps or more   Education   Education Provided Mobility training;Assistive device   Patient Reinforcement needed   End of Consult   Patient Position at End of Consult Bedside chair; All needs within reach;Bed/Chair alarm activated     Edward Martinez, PT, DPT

## 2022-11-30 NOTE — PLAN OF CARE
Problem: PAIN - ADULT  Goal: Verbalizes/displays adequate comfort level or baseline comfort level  Description: Interventions:  - Encourage patient to monitor pain and request assistance  - Assess pain using appropriate pain scale  - Administer analgesics based on type and severity of pain and evaluate response  - Implement non-pharmacological measures as appropriate and evaluate response  - Consider cultural and social influences on pain and pain management  - Notify physician/advanced practitioner if interventions unsuccessful or patient reports new pain  Outcome: Progressing     Problem: MOBILITY - ADULT  Goal: Maintain or return to baseline ADL function  Description: INTERVENTIONS:  -  Assess patient's ability to carry out ADLs; assess patient's baseline for ADL function and identify physical deficits which impact ability to perform ADLs (bathing, care of mouth/teeth, toileting, grooming, dressing, etc )  - Assess/evaluate cause of self-care deficits   - Assess range of motion  - Assess patient's mobility; develop plan if impaired  - Assess patient's need for assistive devices and provide as appropriate  - Encourage maximum independence but intervene and supervise when necessary  - Involve family in performance of ADLs  - Assess for home care needs following discharge   - Consider OT consult to assist with ADL evaluation and planning for discharge  - Provide patient education as appropriate  Outcome: Progressing     Problem: Nutrition/Hydration-ADULT  Goal: Nutrient/Hydration intake appropriate for improving, restoring or maintaining nutritional needs  Description: Monitor and assess patient's nutrition/hydration status for malnutrition  Collaborate with interdisciplinary team and initiate plan and interventions as ordered  Monitor patient's weight and dietary intake as ordered or per policy  Utilize nutrition screening tool and intervene as necessary   Determine patient's food preferences and provide high-protein, high-caloric foods as appropriate       INTERVENTIONS:  - Monitor oral intake, urinary output, labs, and treatment plans  - Assess nutrition and hydration status and recommend course of action  - Evaluate amount of meals eaten  - Assist patient with eating if necessary   - Allow adequate time for meals  - Recommend/ encourage appropriate diets, oral nutritional supplements, and vitamin/mineral supplements  - Order, calculate, and assess calorie counts as needed  - Recommend, monitor, and adjust tube feedings and TPN/PPN based on assessed needs  - Assess need for intravenous fluids  - Provide specific nutrition/hydration education as appropriate  - Include patient/family/caregiver in decisions related to nutrition  Outcome: Progressing

## 2022-11-30 NOTE — OCCUPATIONAL THERAPY NOTE
Occupational Therapy Progress Note     Patient Name: Rohan Ortiz  [id number] Date: 11/30/2022  Problem List  Principal Problem:    SHIMON (generalized anxiety disorder)  Active Problems:    Atrial fibrillation    Antiphospholipid antibody with hypercoagulable state     Tremor of right hand    History of CVA (cerebrovascular accident)    Multiple electrolyte abnormalities    Hypernatremia    Severe protein-calorie malnutrition     Hyperthyroidism    Cognitive impairment    Diarrhea          11/30/22 0934   OT Last Visit   OT Visit Date 11/30/22   Note Type   Note Type Treatment   Pain Assessment   Pain Assessment Tool 0-10   Pain Score No Pain   Restrictions/Precautions   Other Precautions Cognitive; Chair Alarm; Bed Alarm;Multiple lines; Fall Risk   Lifestyle   Autonomy Per EMR, at baseline pt is I with ADLS and mobility  Reciprocal Relationships Sister   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   Intrinsic Gratification Building model cars and walking   ADL   Where Assessed Edge of bed   Grooming Assistance 5  Supervision/Setup   Grooming Deficit Wash/dry face; Wash/dry hands   UB Bathing Assistance 4  Minimal Assistance   UB Bathing Deficit Right arm;Left arm; Abdomen; Chest   UB Bathing Comments A for thoroughness   LB Bathing Assistance 3  Moderate Assistance   LB Bathing Deficit Buttocks;Right lower leg including foot; Left lower leg including foot   LB Bathing Comments increased A for thoroughness   UB Dressing Assistance 4  Minimal Assistance   UB Dressing Deficit Thread RUE; Thread LUE   LB Dressing Assistance 3  Moderate Assistance   LB Dressing Deficit Don/doff R sock; Don/doff L sock   LB Dressing Comments tailor sit method   Toileting Assistance  1  Total Assistance   Toileting Deficit   (mcgregor + rectal tube)   Bed Mobility   Supine to Sit 3  Moderate assistance   Additional items Assist x 2;HOB elevated; Bedrails; Increased time required;LE management   Sit to Supine Unable to assess Transfers   Sit to Stand 3  Moderate assistance   Additional items Assist x 2; Increased time required;Verbal cues   Stand to Sit 3  Moderate assistance   Additional items Assist x 2; Increased time required;Verbal cues   Additional Comments transfers with RW   Functional Mobility   Functional Mobility 3  Moderate assistance   Additional Comments Initially MOD A x 2, progressed to MOD A x 1 + MIN A of another   Additional items Rolling walker   Subjective   Subjective "I have this tube" (re rectal tube)   Cognition   Overall Cognitive Status Impaired   Arousal/Participation Responsive; Cooperative   Attention Attends with cues to redirect   Orientation Level Oriented to person;Oriented to place;Oriented to time   Following Commands Follows one step commands with increased time or repetition   Comments Pt continues to have increased command following and responsiveness  Flat affect, increased cueing at times   Activity Tolerance   Activity Tolerance Patient limited by fatigue   Medical Staff Made Aware PT Noreen Atkinson RN clearance for session   Assessment   Assessment Patient participated in Skilled OT session this date with interventions consisting of ADL re training with the use of correct body mechnaics, Energy Conservation techniques, safety awareness and fall prevention techniques,  therapeutic activities to: increase activity tolerance, increase dynamic sit/ stand balance during functional activity  and increase OOB/ sitting tolerance   Upon arrival patient was found supine in bed  Pt demonstrated the following tasks: MOD A x 2 sup to sit, STS, and fnxl mobility with RW  Pt performs grooming with S, MIN A for UB ADLs, and MOD A for LB ADLs  Patient continues to be functioning below baseline level, occupational performance remains limited secondary to factors listed above and increased risk for falls and injury  From OT standpoint, recommendation at time of d/c would be STR    Patient to benefit from continued Occupational Therapy treatment while in the hospital to address deficits as defined above and maximize level of functional independence with ADLs and functional mobility  Pt was left in chair with alarm on after session with all current needs met  The patient's raw score on the AM-PAC Daily Activity inpatient short form is 14, standardized score is 33 39, less than 39 4  Patients at this level are likely to benefit from discharge to post-acute rehabilitation services  Please refer to the recommendation of the Occupational Therapist for safe discharge planning  Plan   Treatment Interventions ADL retraining;Functional transfer training;UE strengthening/ROM; Endurance training;Cognitive reorientation;Patient/family training;Equipment evaluation/education; Compensatory technique education;Continued evaluation; Energy conservation; Activityengagement   Goal Expiration Date 12/21/22   OT Treatment Day 9   OT Frequency   (2-4x/wk)   Recommendation   OT Discharge Recommendation Post acute rehabilitation services   [facility] Daily Activity Inpatient   Lower Body Dressing 2   Bathing 2   Toileting 1   Upper Body Dressing 3   Grooming 3   Eating 3   Daily Activity Raw Score 14   Daily Activity Standardized Score (Calc for Raw Score >=11) 33 39   AM-[facility] Applied Cognition Inpatient   Following a Speech/Presentation 3   Understanding Ordinary Conversation 3   Taking Medications 3   Remembering Where Things Are Placed or Put Away 2   Remembering List of 4-5 Errands 2   Taking Care of Complicated Tasks 1   Applied Cognition Raw Score 14   Applied Cognition Standardized Score 32 02     Koren Sacks, MS, OTR/L

## 2022-11-30 NOTE — TELEPHONE ENCOUNTER
Called and spoke with the patient's sister and confirmed the cancellation of his appointment tomorrow 12/10/22 with Angelia Servin as he is currently still admitted into the hospital

## 2022-11-30 NOTE — ASSESSMENT & PLAN NOTE
Medications reviewed -> Senokot previously discontinued  Diarrhea has been waxing/waning but persistent over the last several days despite PRN Imodium ordered previously  Continue IV fluid hydration monitor/replete electrolyte deficiencies if present  Rectal tube remains in place  Appreciated gastroenterology input ->c diff negative, giardia negative, ova and parasite negative ->   Will start standing imodium given work up is negative  Monitor stool output

## 2022-11-30 NOTE — ASSESSMENT & PLAN NOTE
Per chart review patient has had low TSH and elevated free T4 in the past   Most recent labs during this admission with TSH 0 282 and free t4 1 56  Thyroid U/S during last admission at [address] [place] [address] was unremarkable   Evaluated by endocrinology in the past during previous admission at [address] [place] [address] (Oct 22) and they recommend get repeat TFT, TSI and thyroid abs   Appreciate endocrinology evaluation this hospital course -> likely subclinical hyperthyroidism -> repeat thyroid testing in 4-6 weeks once acute medical issues stabilized

## 2022-11-30 NOTE — PLAN OF CARE
Problem: PHYSICAL THERAPY ADULT  Goal: Performs mobility at highest level of function for planned discharge setting  See evaluation for individualized goals  Description: Treatment/Interventions: LE strengthening/ROM, Functional transfer training, Endurance training, Patient/family training, Bed mobility, Cognitive reorientation, Therapeutic exercise          See flowsheet documentation for full assessment, interventions and recommendations  Outcome: Progressing  Note: Prognosis: Guarded  Problem List: Decreased strength, Decreased range of motion, Decreased endurance, Impaired balance, Decreased mobility, Decreased cognition, Impaired judgement, Decreased safety awareness, Pain  Assessment: Pt seen for PT treatment session this date  Therapy session focused on bed mobility, functional transfers, gait training, therapeutic exercise and sitting balance/ tolerance + endurance training in order to improve overall mobility and independence  Pt requires assist of 2 for all mobility performed this date  Pt much more responsive this therapy session, able to follow more commands  Pt requires mod Ax2 for bed mobility, functional transfers using RW and ambulation using RW + close chair follow  Pt increased ambulation distance to a total of 15 ft this date  Cues required too increase TEE + step length  Pt enjoyed going out into hallway and ambulating to window  Pt performed seated therex to b/l LEs without complainants; pt counted out loud to assist with tracking reps  Pt making slow but steady progress toward goals  Pt was left sitting OOB in recliner at the end of PT session with all needs in reach  Pt would benefit from continued PT services while in hospital to address remaining limitations  PT to continue to follow pt and recommends rehab upon d c  The patient's AM-PAC Basic Mobility Inpatient Short Form Raw Score is 7   A Raw score of less than or equal to 16 suggests the patient may benefit from discharge to post-acute rehabilitation services  Please also refer to the recommendation of the Physical Therapist for safe discharge planning  Barriers to Discharge: Inaccessible home environment, Decreased caregiver support     PT Discharge Recommendation: Post acute rehabilitation services    See flowsheet documentation for full assessment

## 2022-11-30 NOTE — PROGRESS NOTES
[address] [facility]  Progress Note - Frankie Shoulder 1960, 58 y o  male MRN: [id number]  Unit/Bed#: [facility] 907-01 Encounter: [id number]  Primary Care Provider: Tg Waggoner MD   Date and time admitted to hospital: 11/3/2022  3:28 PM    * SHIMON (generalized anxiety disorder)  Assessment & Plan  Previous admission from the end of September until the end of October at [facility] in which he was hospitalized for over a month for severe anxiety and inability to care for self  Per sister reportedly was able to ambulate prior to the hospitalization but after the hospitalization patient needing assistance with ambulation at home  During hospitalization there patient developed hyponatremia during his course of stay and Zoloft that had been started was stopped due to hyponatremia  Recently discharged from inpatient psychiatry with mirtazapine and aripiprazole, discussed resumption of thesewith the patient but he reports excess sedation with these   D/w psychiatry earlier this week started buspar 5mg BID, and lexapro  He does admit to depressive symptoms and anxiety, discussing certain topics with him result is worsening tremor  Monitor response to antidepressants  Reconsulted psychiatry for re-evaluation / possible IP psych admission for [facility] psychiatry on 11/18 recommended inpatient psychiatry admission, in-person evaluation with our on-site psychiatrist 11/21 did not feel this was necessary  Recommended increasing escitalopram, making lorazepam scheduled as patient does not ask for it but his anxiety might benefit from it, and continuing BusPar  Overall plan is to help patient improve his anxiety so that he can get to rehab    Per updated discussion w/ psychiatry -> continue current Lexapro/Buspar/Ativan regimen -> okay for discharge to skilled rehab from a psychiatric point of view -> awaiting placement, however, patient likely undergoing "OPTIONS" process prior to skilled rehab placement due to underlying psychiatric history    Diarrhea  Assessment & Plan  Medications reviewed -> Senokot previously discontinued  Diarrhea has been waxing/waning but persistent over the last several days despite PRN Imodium ordered previously  Continue IV fluid hydration monitor/replete electrolyte deficiencies if present  Rectal tube remains in place  Appreciated gastroenterology input ->c diff negative, giardia negative, ova and parasite negative ->   Will start standing imodium given work up is negative  Monitor stool output    Antiphospholipid antibody with hypercoagulable state   Assessment & Plan  Continue Coumadin regimen - had ischemic stroke in the past despite Eliquis, hence, deemed Eliquis failure  Due to subtherapeutic INR, Coumadin being bridged w/ therapeutic Lovenox currently    INR remains at 1 3 over last few days  Will increase coumadin to 10 mg every evening    Cognitive impairment  Assessment & Plan  Per neuropsychology evaluation on 11/14, patient does NOT have capacity to make medical decisions   Patient also has some level of cognitive impairment   B12 borderline low -> s/p one time intramuscular B12 shot -> continue supplementation  Patient intermittently refusing medications per nursing    Hyperthyroidism  Assessment & Plan  Per chart review patient has had low TSH and elevated free T4 in the past   Most recent labs during this admission with TSH 0 282 and free t4 1 56  Thyroid U/S during last admission at [facility] was unremarkable   Evaluated by endocrinology in the past during previous admission at [facility] (Oct 22) and they recommend get repeat TFT, TSI and thyroid abs   Appreciate endocrinology evaluation this hospital course -> likely subclinical hyperthyroidism -> repeat thyroid testing in 4-6 weeks once acute medical issues stabilized    Severe protein-calorie malnutrition   Assessment & Plan  BMI of 28 33 currently in the setting of decreased appetite/oral intake and unintentional weight loss  Continue nutritional supplementation w/ meals    Multiple electrolyte abnormalities  Assessment & Plan  Monitor/replete serum phosphate/potassium/magnesium as necessary  Consider insensible losses in the setting of intractable diarrhea    History of CVA (cerebrovascular accident)  Assessment & Plan  H/o CVA x 3 in setting of antiphospholipid syndrome  On Coumadin for anticoagulation  CT head in ER: No acute intracranial abnormality   Encephalomalacia involving the right occipital lobe and bilateral cerebelli   Grossly stable appearance of periventricular hypoattenuation compared to MRI brain 8/29/2022, likely representing chronic microvascular ischemic changes  Appreciate PT/OT input ->  plan for skilled rehab placement once medically stabilized    Tremor of right hand  Assessment & Plan  Outpatient follow-up w/ neurology - consider sequela of prior strokes ? ? Atrial fibrillation  Assessment & Plan  Rate controlled on Toprol-XL  Continue Coumadin for anticoagulation (w/ therapeutic Lovenox bridge until INR at goal)    Abdominal distension-resolved as of 11/16/2022  Assessment & Plan  · Possibly the cause of his poor oral intake  · Reports improved distention and had a large BM recently  · No signs of obstruction on CT abdomen  · Regular diet, monitor for tolerance    Dehydration-resolved as of 11/16/2022  Assessment & Plan  Due to poor po intake, decreased appetite, possible constipation or fecal impaction  Improved now  Monitor off IV hydration  Monitor PO intake - patient has been eating all his meals     Sepsis (HCC)-resolved as of 11/10/2022  Assessment & Plan  -WBC 14 K, heart rate 114,  lactic acid 2 3 on admission  -chest x-ray with no obvious focal infiltrate; daughter reports diffuse weakness and at times patient coughing after eating  Diet modified by speech therapy    -urine with foul odor-urinalysis grossly negative    Plan   Patient is afebrile, with stable procalcitonin, negative UA and negative x-ray  Could consider silent aspiration as potential source of infection; however, imaging appears negative currently  Discontineud ceftriaxone after 3 day course  DAYAN (acute kidney injury) (HCC)-resolved as of 11/6/2022  Assessment & Plan  -see rhabdomyolysis section    Hypernatremia-resolved as of 11/30/2022  Assessment & Plan  On presentation: sodium 151 -> progressively improved with normalization on 11/25  Appreciate nephrology input  Encourage increased oral intake  Continue serum sodium monitoring    Rhabdomyolysis-resolved as of 11/9/2022  Assessment & Plan  -CPK > 7000 on admission with DAYAN  -in setting of minimal ambulation and has been going up the stairs by crawling in using his buttocks    CPK significant improved   DAYAN resolved     Supratherapeutic INR-resolved as of 11/9/2022  Assessment & Plan  Increased increased to 9 has history of antiphospholipid syndrome and AFib  -no evidence of bleeding  Plan  · > as INR mildly elevated and no source of bleeding, will hold off giving any reversal agents of supratherapeutic INR especially given patient's history of antiphospholipid syndrome and history of 3 previous CVA as well as having ischemic   · likely etiology of of elevated INR is multifactorial   Suspect poor nutrition in conjunction with vitamin K antagonism of Coumadin as etiology of elevated INR  Patient INR trending downward with holding of Coumadin  Given INR is less than 10 and history of hypercoagulable state in the setting of stroke history would prefer to monitor and trend off of Coumadin  continue warfarin , bridge with lovenox until therapeutic          VTE Pharmacologic Prophylaxis: VTE Score: 3 Moderate Risk (Score 3-4) - Pharmacological DVT Prophylaxis Ordered: enoxaparin (Lovenox)  Patient Centered Rounds: I performed bedside rounds with nursing staff today    Discussions with Specialists or Other Care Team Provider: case management    Education and Discussions with Family / Patient: Patient declined call to   Time Spent for Care: 30 minutes  More than 50% of total time spent on counseling and coordination of care as described above  Current Length of Stay: 27 day(s)  Current Patient Status: Inpatient   Certification Statement: The patient will continue to require additional inpatient hospital stay due to diarrhea  Discharge Plan: Anticipate discharge in 24-48 hrs to rehab facility  Code Status: Level 3 - DNAR and DNI    Subjective:   Patient currently offers no acute complaints  States that he wants rectal tube out  Advised patient that hopefully tomorrow it can be removed if diarrhea improves    Objective:     Vitals:   Temp (24hrs), Av 2 °F (36 2 °C), Min:96 8 °F (36 °C), Max:97 5 °F (36 4 °C)    Temp:  [96 8 °F (36 °C)-97 5 °F (36 4 °C)] 97 3 °F (36 3 °C)  HR:  [89] 89  Resp:  [16-18] 18  BP: (135-140)/(85-90) 136/86  SpO2:  [98 %] 98 %  Body mass index is 28 86 kg/m²  Input and Output Summary (last 24 hours): Intake/Output Summary (Last 24 hours) at 2022 1834  Last data filed at 2022 1700  Gross per 24 hour   Intake 1436 67 ml   Output 2700 ml   Net -1263 33 ml       Physical Exam:   Physical Exam  Vitals reviewed  HENT:      Head: Normocephalic and atraumatic  Right Ear: External ear normal       Left Ear: External ear normal       Nose: Nose normal       Mouth/Throat:      Mouth: Mucous membranes are moist       Pharynx: Oropharynx is clear  Eyes:      Extraocular Movements: Extraocular movements intact  Cardiovascular:      Rate and Rhythm: Normal rate  Rhythm irregular  Heart sounds: Normal heart sounds  Pulmonary:      Effort: Pulmonary effort is normal       Breath sounds: Normal breath sounds  Abdominal:      General: Abdomen is flat  Palpations: Abdomen is soft  Tenderness: There is no abdominal tenderness  Musculoskeletal:      Cervical back: Normal range of motion        Right lower leg: No edema  Left lower leg: No edema  Skin:     General: Skin is warm and dry  Neurological:      Mental Status: He is alert  Mental status is at baseline  Comments: Baseline RUE tremor    Psychiatric:         Mood and Affect: Mood normal          Behavior: Behavior normal           Additional Data:     Labs:  Results from last 7 days   Lab Units 11/30/22  0521 11/29/22  0455   WBC Thousand/uL 7 75 6 28   HEMOGLOBIN g/dL 11 4* 11 1*   HEMATOCRIT % 34 0* 33 9*   PLATELETS Thousands/uL 164 151   NEUTROS PCT %  --  79*   LYMPHS PCT %  --  11*   MONOS PCT %  --  7   EOS PCT %  --  2     Results from last 7 days   Lab Units 11/30/22  0521   SODIUM mmol/L 138   POTASSIUM mmol/L 3 8   CHLORIDE mmol/L 110*   CO2 mmol/L 23   BUN mg/dL 6   CREATININE mg/dL 0 44*   ANION GAP mmol/L 5   CALCIUM mg/dL 8 5   ALBUMIN g/dL 2 4*   GLUCOSE RANDOM mg/dL 102     Results from last 7 days   Lab Units 11/30/22  0521   INR  1 31*                   Lines/Drains:  Invasive Devices     Peripheral Intravenous Line  Duration           Peripheral IV 11/27/22 Left;Ventral (anterior) Wrist 3 days          Drain  Duration           Rectal Tube With balloon 6 days                      Imaging: No pertinent imaging reviewed      Recent Cultures (last 7 days):   Results from last 7 days   Lab Units 11/27/22  1812   C DIFF TOXIN B BY PCR  Negative       Last 24 Hours Medication List:   Current Facility-Administered Medications   Medication Dose Route Frequency Provider Last Rate   • busPIRone  5 mg Oral BID Anupam Borrero MD     • vitamin B-12  500 mcg Oral Daily Tana Lott DO     • cyanocobalamin  1,000 mcg Intramuscular Q30 Days Tana Lott DO     • enoxaparin  1 mg/kg Subcutaneous Q12H Alcon Ingram MD     • escitalopram  10 mg Oral Daily Braeden Salter MD     • fluticasone  1 spray Each Nare Daily Tana Lott DO     • folic acid  1 mg Oral Daily Anupam Borrero MD     • loperamide  2 mg Oral TID Gustabo Mcgee DO • LORazepam  0 5 mg Oral BID Juan R Taylor MD     • metoprolol  5 mg Intravenous Q6H PRN Bambi Lopez MD     • metoprolol succinate  100 mg Oral Daily Juan R Taylor MD     • multivitamin stress formula  1 tablet Oral Daily Shukri Carson MD     • pantoprazole  40 mg Oral Early Morning Tana Lott DO     • potassium chloride  40 mEq Oral BID Juan R Taylor MD     • sodium chloride  1 spray Each Nare Q1H PRN Juan R Taylor MD     • sodium chloride 0 9 % with KCl 40 mEq/L  50 mL/hr Intravenous Continuous Gustabo Mcgee DO 50 mL/hr (11/30/22 2440)   • thiamine  100 mg Oral Daily Shukri Carson MD     • [START ON 12/1/2022] warfarin  10 mg Oral QPM Gustabo Mcgee DO          Today, Patient Was Seen By: Wolf Brown DO    **Please Note: This note may have been constructed using a voice recognition system  **

## 2022-11-30 NOTE — ASSESSMENT & PLAN NOTE
-WBC 14 K, heart rate 114,  lactic acid 2 3 on admission  -chest x-ray with no obvious focal infiltrate; daughter reports diffuse weakness and at times patient coughing after eating  Diet modified by speech therapy  -urine with foul odor-urinalysis grossly negative    Plan   Patient is afebrile, with stable procalcitonin, negative UA and negative x-ray  Could consider silent aspiration as potential source of infection; however, imaging appears negative currently  Discontineud ceftriaxone after 3 day course

## 2022-11-30 NOTE — ASSESSMENT & PLAN NOTE
Previous admission from the end of September until the end of October at [facility] in which he was hospitalized for over a month for severe anxiety and inability to care for self  Per sister reportedly was able to ambulate prior to the hospitalization but after the hospitalization patient needing assistance with ambulation at home  During hospitalization there patient developed hyponatremia during his course of stay and Zoloft that had been started was stopped due to hyponatremia  Recently discharged from inpatient psychiatry with mirtazapine and aripiprazole, discussed resumption of thesewith the patient but he reports excess sedation with these   D/w psychiatry earlier this week started buspar 5mg BID, and lexapro  He does admit to depressive symptoms and anxiety, discussing certain topics with him result is worsening tremor  Monitor response to antidepressants  Reconsulted psychiatry for re-evaluation / possible IP psych admission for [facility] psychiatry on 11/18 recommended inpatient psychiatry admission, in-person evaluation with our on-site psychiatrist 11/21 did not feel this was necessary  Recommended increasing escitalopram, making lorazepam scheduled as patient does not ask for it but his anxiety might benefit from it, and continuing BusPar  Overall plan is to help patient improve his anxiety so that he can get to rehab    Per updated discussion w/ psychiatry -> continue current Lexapro/Buspar/Ativan regimen -> okay for discharge to skilled rehab from a psychiatric point of view -> awaiting placement, however, patient likely undergoing "OPTIONS" process prior to skilled rehab placement due to underlying psychiatric history

## 2022-11-30 NOTE — ASSESSMENT & PLAN NOTE
Continue Coumadin regimen - had ischemic stroke in the past despite Eliquis, hence, deemed Eliquis failure  Due to subtherapeutic INR, Coumadin being bridged w/ therapeutic Lovenox currently    INR remains at 1 3 over last few days  Will increase coumadin to 10 mg every evening

## 2022-11-30 NOTE — PLAN OF CARE
Problem: OCCUPATIONAL THERAPY ADULT  Goal: Performs self-care activities at highest level of function for planned discharge setting  See evaluation for individualized goals  Description: Treatment Interventions: ADL retraining, Functional transfer training, UE strengthening/ROM, Endurance training, Patient/family training, Cognitive reorientation, Equipment evaluation/education, Compensatory technique education, Continued evaluation, Energy conservation, Activityengagement          See flowsheet documentation for full assessment, interventions and recommendations  Outcome: Progressing  Note: Limitation: Decreased ADL status, Decreased endurance, Decreased cognition, Decreased self-care trans, Decreased high-level ADLs  Prognosis: Fair  Assessment: Patient participated in Skilled OT session this date with interventions consisting of ADL re training with the use of correct body mechnaics, Energy Conservation techniques, safety awareness and fall prevention techniques,  therapeutic activities to: increase activity tolerance, increase dynamic sit/ stand balance during functional activity  and increase OOB/ sitting tolerance   Upon arrival patient was found supine in bed  Pt demonstrated the following tasks: MOD A x 2 sup to sit, STS, and fnxl mobility with RW  Pt performs grooming with S, MIN A for UB ADLs, and MOD A for LB ADLs  Patient continues to be functioning below baseline level, occupational performance remains limited secondary to factors listed above and increased risk for falls and injury  From OT standpoint, recommendation at time of d/c would be STR  Patient to benefit from continued Occupational Therapy treatment while in the hospital to address deficits as defined above and maximize level of functional independence with ADLs and functional mobility  Pt was left in chair with alarm on after session with all current needs met   The patient's raw score on the AM-PAC Daily Activity inpatient short form is 14, standardized score is 33 39, less than 39 4  Patients at this level are likely to benefit from discharge to post-acute rehabilitation services  Please refer to the recommendation of the Occupational Therapist for safe discharge planning       OT Discharge Recommendation: Post acute rehabilitation services

## 2022-11-30 NOTE — UTILIZATION REVIEW
Continued Stay Review    Date: 11/29/2022                          Current Patient Class: inpatient Current Level of Care: med/surg    HPI:62 y o  male initially admitted on 11/3 with APLS syndrome, recurrent CVA initially admitted on 11/3 with worsening generalized weakness and rhabdomyolysis, sepsis, supratherapeutic INR     Assessment/Plan: Per CM Maria Isabel Lucas now able to accept pt but pt now with diarrhea and rectal tube in place  C diff negative, giardia negative, ova and parasite still pending -> hold Imodium until infectious workup negative  Continue supportive care  Po meds  SCDs   OOB with assist      Vital Signs:   Date/Time Temp Pulse Resp BP MAP (mmHg) SpO2   11/29/22 22:16:58 97 5 °F (36 4 °C) -- 17 135/85 102 --   11/29/22 14:52:41 97 7 °F (36 5 °C) -- -- 129/83 98 --   11/29/22 08:25:38 97 9 °F (36 6 °C) -- -- 164/101 Abnormal  122 --   11/28/22 22:06:40 97 5 °F (36 4 °C) 86 16 126/79 95 97 %   11/28/22 15:46:12 97 °F (36 1 °C) Abnormal  81 16 121/78 92 96 %   11/28/22 0902 -- 118 Abnormal  -- -- -- --   11/28/22 08:03:15 98 2 °F (36 8 °C) -- -- 154/100 118 --       Pertinent Labs/Diagnostic Results:     Results from last 7 days   Lab Units 11/29/22 0455 11/28/22  0553 11/27/22  0505 11/26/22  0511   WBC Thousand/uL 6 28 6 15 5 48 5 02   HEMOGLOBIN g/dL 11 1* 11 1* 10 7* 10 3*   HEMATOCRIT % 33 9* 33 3* 33 5* 31 8*   PLATELETS Thousands/uL 151 149 154 136*   NEUTROS ABS Thousands/µL 5 03 4 88 4 24 3 87     Results from last 7 days   Lab Units 11/29/22  0455 11/28/22  0553 11/27/22  0505 11/26/22  0511   SODIUM mmol/L 140 140 142 145   POTASSIUM mmol/L 3 3* 3 4* 4 0 3 5   CHLORIDE mmol/L 113* 111* 113* 117*   CO2 mmol/L 22 22 23 22   ANION GAP mmol/L 5 7 6 6   BUN mg/dL 8 6 7 9   CREATININE mg/dL 0 47* 0 56* 0 42* 0 40*   EGFR ml/min/1 73sq m 119 110 124 127   CALCIUM mg/dL 8 1* 8 3 8 3 8 1*   MAGNESIUM mg/dL 2 1 1 8 2 0 2 0   PHOSPHORUS mg/dL 3 0 3 1 2 2* 2 1*     Results from last 7 days   Lab Units 11/29/22  0455 11/28/22  0553 11/27/22  0505 11/26/22  0511 11/25/22  0504 11/24/22  0521   GLUCOSE RANDOM mg/dL 103 115 96 105 109 114     Results from last 7 days   Lab Units 11/29/22  0455 11/28/22  0553   PROTIME seconds 16 6* 17 1*   INR  1 32* 1 37*     Results from last 7 days   Lab Units 11/27/22  1812   C DIFF TOXIN B BY PCR  Negative     Results from last 7 days   Lab Units 11/27/22  1812   SALMONELLA SP PCR  None Detected   SHIGELLA SP/ENTEROINVASIVE E  COLI (EIEC)  None Detected   CAMPYLOBACTER SP (JEJUNI AND COLI)  None Detected   SHIGA TOXIN 1/SHIGA TOXIN 2  None Detected         Medications:   Scheduled Medications:  busPIRone, 5 mg, Oral, BID  vitamin B-12, 500 mcg, Oral, Daily  cyanocobalamin, 1,000 mcg, Intramuscular, Q30 Days  enoxaparin, 1 mg/kg, Subcutaneous, Q12H DARLENE  escitalopram, 10 mg, Oral, Daily  fluticasone, 1 spray, Each Nare, Daily  folic acid, 1 mg, Oral, Daily  loperamide, 2 mg, Oral, TID  LORazepam, 0 5 mg, Oral, BID  metoprolol succinate, 100 mg, Oral, Daily  multivitamin stress formula, 1 tablet, Oral, Daily  pantoprazole, 40 mg, Oral, Early Morning  potassium chloride, 40 mEq, Oral, BID  thiamine, 100 mg, Oral, Daily  warfarin, 7 5 mg, Oral, QPM    Continuous IV Infusions:  sodium chloride 0 9 % with KCl 40 mEq/L, 50 mL/hr, Intravenous, Continuous    PRN Meds:  metoprolol, 5 mg, Intravenous, Q6H PRN  sodium chloride, 1 spray, Each Nare, Q1H PRN        Discharge Plan: D    Network Utilization Review Department  ATTENTION: Please call with any questions or concerns to [phone] and carefully listen to the prompts so that you are directed to the right person  All voicemails are confidential   Ty Limb all requests for admission clinical reviews, approved or denied determinations and any other requests to dedicated fax number below belonging to the campus where the patient is receiving treatment   List of dedicated fax numbers for the Facilities:  [facility] ADMISSION DENIALS (Administrative/Medical Necessity) [phone]   [address] (Maternity/NICU/Pediatrics) Anais Keen [address] Rosette Brady  [phone]   [address] [place] [place] [id number] [place] [address] [address] [facility] [place] [address] [phone]

## 2022-12-01 LAB
ALBUMIN SERPL BCP-MCNC: 2.1 G/DL (ref 3.5–5)
ANION GAP SERPL CALCULATED.3IONS-SCNC: 8 MMOL/L (ref 4–13)
BUN SERPL-MCNC: 9 MG/DL (ref 5–25)
CALCIUM ALBUM COR SERPL-MCNC: 10.3 MG/DL (ref 8.3–10.1)
CALCIUM SERPL-MCNC: 8.8 MG/DL (ref 8.3–10.1)
CHLORIDE SERPL-SCNC: 113 MMOL/L (ref 96–108)
CO2 SERPL-SCNC: 17 MMOL/L (ref 21–32)
CREAT SERPL-MCNC: 0.53 MG/DL (ref 0.6–1.3)
ERYTHROCYTE [DISTWIDTH] IN BLOOD BY AUTOMATED COUNT: 17 % (ref 11.6–15.1)
GFR SERPL CREATININE-BSD FRML MDRD: 113 ML/MIN/1.73SQ M
GLUCOSE SERPL-MCNC: 102 MG/DL (ref 65–140)
HCT VFR BLD AUTO: 35.7 % (ref 36.5–49.3)
HGB BLD-MCNC: 11.2 G/DL (ref 12–17)
INR PPP: 1.5 (ref 0.84–1.19)
MAGNESIUM SERPL-MCNC: 2.3 MG/DL (ref 1.6–2.6)
MCH RBC QN AUTO: 32.3 PG (ref 26.8–34.3)
MCHC RBC AUTO-ENTMCNC: 31.4 G/DL (ref 31.4–37.4)
MCV RBC AUTO: 103 FL (ref 82–98)
PHOSPHATE SERPL-MCNC: 2.6 MG/DL (ref 2.3–4.1)
PLATELET # BLD AUTO: 167 THOUSANDS/UL (ref 149–390)
PMV BLD AUTO: 9.9 FL (ref 8.9–12.7)
POTASSIUM SERPL-SCNC: 3.9 MMOL/L (ref 3.5–5.3)
PROTHROMBIN TIME: 18.4 SECONDS (ref 11.6–14.5)
RBC # BLD AUTO: 3.47 MILLION/UL (ref 3.88–5.62)
SODIUM SERPL-SCNC: 138 MMOL/L (ref 135–147)
WBC # BLD AUTO: 7.05 THOUSAND/UL (ref 4.31–10.16)

## 2022-12-01 RX ORDER — SODIUM BICARBONATE 650 MG/1
1300 TABLET ORAL 3 TIMES DAILY
Status: COMPLETED | OUTPATIENT
Start: 2022-12-01 | End: 2022-12-02

## 2022-12-01 RX ORDER — LOPERAMIDE HYDROCHLORIDE 2 MG/1
2 CAPSULE ORAL 3 TIMES DAILY
Status: DISCONTINUED | OUTPATIENT
Start: 2022-12-01 | End: 2022-12-02

## 2022-12-01 RX ADMIN — FLUTICASONE PROPIONATE 1 SPRAY: 50 SPRAY, METERED NASAL at 09:31

## 2022-12-01 RX ADMIN — BUSPIRONE HYDROCHLORIDE 5 MG: 5 TABLET ORAL at 16:47

## 2022-12-01 RX ADMIN — B-COMPLEX W/ C & FOLIC ACID TAB 1 TABLET: TAB at 09:31

## 2022-12-01 RX ADMIN — POTASSIUM CHLORIDE 40 MEQ: 1500 TABLET, EXTENDED RELEASE ORAL at 09:31

## 2022-12-01 RX ADMIN — METOPROLOL SUCCINATE 100 MG: 100 TABLET, EXTENDED RELEASE ORAL at 09:31

## 2022-12-01 RX ADMIN — ENOXAPARIN SODIUM 90 MG: 100 INJECTION SUBCUTANEOUS at 09:31

## 2022-12-01 RX ADMIN — SODIUM BICARBONATE 650 MG TABLET 1300 MG: at 21:15

## 2022-12-01 RX ADMIN — POTASSIUM CHLORIDE AND SODIUM CHLORIDE 50 ML/HR: 900; 300 INJECTION, SOLUTION INTRAVENOUS at 21:20

## 2022-12-01 RX ADMIN — ENOXAPARIN SODIUM 90 MG: 100 INJECTION SUBCUTANEOUS at 21:15

## 2022-12-01 RX ADMIN — FOLIC ACID 1 MG: 1 TABLET ORAL at 09:30

## 2022-12-01 RX ADMIN — THIAMINE HCL TAB 100 MG 100 MG: 100 TAB at 09:31

## 2022-12-01 RX ADMIN — LORAZEPAM 0.5 MG: 0.5 TABLET ORAL at 09:31

## 2022-12-01 RX ADMIN — LOPERAMIDE HYDROCHLORIDE 2 MG: 2 CAPSULE ORAL at 16:46

## 2022-12-01 RX ADMIN — LOPERAMIDE HYDROCHLORIDE 2 MG: 2 CAPSULE ORAL at 09:31

## 2022-12-01 RX ADMIN — SODIUM BICARBONATE 650 MG TABLET 1300 MG: at 16:47

## 2022-12-01 RX ADMIN — LORAZEPAM 0.5 MG: 0.5 TABLET ORAL at 16:47

## 2022-12-01 RX ADMIN — CYANOCOBALAMIN TAB 500 MCG 500 MCG: 500 TAB at 09:31

## 2022-12-01 RX ADMIN — WARFARIN SODIUM 10 MG: 5 TABLET ORAL at 16:46

## 2022-12-01 RX ADMIN — BUSPIRONE HYDROCHLORIDE 5 MG: 5 TABLET ORAL at 09:31

## 2022-12-01 RX ADMIN — ESCITALOPRAM OXALATE 10 MG: 10 TABLET ORAL at 09:30

## 2022-12-01 RX ADMIN — POTASSIUM CHLORIDE 40 MEQ: 1500 TABLET, EXTENDED RELEASE ORAL at 16:47

## 2022-12-01 RX ADMIN — LOPERAMIDE HYDROCHLORIDE 2 MG: 2 CAPSULE ORAL at 21:15

## 2022-12-01 NOTE — ASSESSMENT & PLAN NOTE
Per chart review patient has had low TSH and elevated free T4 in the past   Most recent labs during this admission with TSH 0 282 and free t4 1 56  Thyroid U/S during last admission at [address] [place] [address] was unremarkable   Evaluated by endocrinology in the past during previous admission at [address] [place] [address] (Oct 22) and they recommend get repeat TFT, TSI and thyroid abs   Appreciate endocrinology evaluation this hospital course -> likely subclinical hyperthyroidism -> repeat thyroid testing in 4-6 weeks once acute medical issues stabilized

## 2022-12-01 NOTE — ASSESSMENT & PLAN NOTE
Continue Coumadin regimen - had ischemic stroke in the past despite Eliquis, hence, deemed Eliquis failure  Due to subtherapeutic INR, Coumadin being bridged w/ therapeutic Lovenox currently    11/31 INR remains at 1 3 over last few days, will increase coumadin to 10 mg     12/1 INR still pending

## 2022-12-01 NOTE — PROGRESS NOTES
[facility]  Progress Note - Arlene Curran 1960, 58 y o  male MRN: [id number]  [facility] Encounter: [id number]  Primary Care Provider: Jessie Mcgee MD   Date and time admitted to hospital: 11/3/2022  3:28 PM    * SHIMON (generalized anxiety disorder)  Assessment & Plan  Previous admission from the end of September until the end of October at [facility] in which he was hospitalized for over a month for severe anxiety and inability to care for self  Per sister reportedly was able to ambulate prior to the hospitalization but after the hospitalization patient needing assistance with ambulation at home  During hospitalization there patient developed hyponatremia during his course of stay and Zoloft that had been started was stopped due to hyponatremia  Recently discharged from inpatient psychiatry with mirtazapine and aripiprazole, discussed resumption of thesewith the patient but he reports excess sedation with these   D/w psychiatry earlier this week started buspar 5mg BID, and lexapro  He does admit to depressive symptoms and anxiety, discussing certain topics with him result is worsening tremor  Monitor response to antidepressants  Reconsulted psychiatry for re-evaluation / possible IP psych admission for [address] [place] [address] on 11/18 recommended inpatient psychiatry admission, in-person evaluation with our on-site psychiatrist 11/21 did not feel this was necessary  Recommended increasing escitalopram, making lorazepam scheduled as patient does not ask for it but his anxiety might benefit from it, and continuing BusPar  Overall plan is to help patient improve his anxiety so that he can get to rehab    Per updated discussion w/ psychiatry -> continue current Lexapro/Buspar/Ativan regimen -> okay for discharge to skilled rehab from a psychiatric point of view -> awaiting placement, however, patient likely undergoing "OPTIONS" process prior to skilled rehab placement due to underlying psychiatric history    Diarrhea  Assessment & Plan  Medications reviewed -> Senokot previously discontinued  Diarrhea has been waxing/waning but persistent over the last several days despite PRN Imodium ordered previously  Continue IV fluid hydration monitor/replete electrolyte deficiencies if present  Rectal tube remains in place  Appreciated gastroenterology input ->c diff negative, giardia negative, ova and parasite negative ->   11/31: Started standing imodium 2 mg tid standing as infectious work up was negative    12/1: continues to have diarrhea, discussed with gi, continue current regiment and reassess tomorrow  Monitor stool output    Antiphospholipid antibody with hypercoagulable state   Assessment & Plan  Continue Coumadin regimen - had ischemic stroke in the past despite Eliquis, hence, deemed Eliquis failure  Due to subtherapeutic INR, Coumadin being bridged w/ therapeutic Lovenox currently    11/31 INR remains at 1 3 over last few days, will increase coumadin to 10 mg     12/1 INR still pending    Cognitive impairment  Assessment & Plan  Per neuropsychology evaluation on 11/14, patient does NOT have capacity to make medical decisions   Patient also has some level of cognitive impairment   B12 borderline low -> s/p one time intramuscular B12 shot -> continue supplementation  Patient intermittently refusing medications per nursing    Hyperthyroidism  Assessment & Plan  Per chart review patient has had low TSH and elevated free T4 in the past   Most recent labs during this admission with TSH 0 282 and free t4 1 56  Thyroid U/S during last admission at [address] [place] [address] was unremarkable   Evaluated by endocrinology in the past during previous admission at [address] [place] [address] (Oct 22) and they recommend get repeat TFT, TSI and thyroid abs   Appreciate endocrinology evaluation this hospital course -> likely subclinical hyperthyroidism -> repeat thyroid testing in 4-6 weeks once acute medical issues stabilized    Severe protein-calorie malnutrition   Assessment & Plan  BMI of 28 33 currently in the setting of decreased appetite/oral intake and unintentional weight loss  Continue nutritional supplementation w/ meals    Multiple electrolyte abnormalities  Assessment & Plan  Monitor/replete serum phosphate/potassium/magnesium as necessary  Consider insensible losses in the setting of intractable diarrhea    History of CVA (cerebrovascular accident)  Assessment & Plan  H/o CVA x 3 in setting of antiphospholipid syndrome  On Coumadin for anticoagulation  CT head in ER: No acute intracranial abnormality   Encephalomalacia involving the right occipital lobe and bilateral cerebelli   Grossly stable appearance of periventricular hypoattenuation compared to MRI brain 8/29/2022, likely representing chronic microvascular ischemic changes  Appreciate PT/OT input ->  plan for skilled rehab placement once medically stabilized    Tremor of right hand  Assessment & Plan  Outpatient follow-up w/ neurology - consider sequela of prior strokes ? ? Atrial fibrillation  Assessment & Plan  Rate controlled on Toprol-XL  Continue Coumadin for anticoagulation (w/ therapeutic Lovenox bridge until INR at goal)    Abdominal distension-resolved as of 11/16/2022  Assessment & Plan  · Possibly the cause of his poor oral intake     · Reports improved distention and had a large BM recently  · No signs of obstruction on CT abdomen  · Regular diet, monitor for tolerance    Dehydration-resolved as of 11/16/2022  Assessment & Plan  Due to poor po intake, decreased appetite, possible constipation or fecal impaction  Improved now  Monitor off IV hydration  Monitor PO intake - patient has been eating all his meals     Sepsis (HCC)-resolved as of 11/10/2022  Assessment & Plan  -WBC 14 K, heart rate 114,  lactic acid 2 3 on admission  -chest x-ray with no obvious focal infiltrate; daughter reports diffuse weakness and at times patient coughing after eating  Diet modified by speech therapy  -urine with foul odor-urinalysis grossly negative    Plan   Patient is afebrile, with stable procalcitonin, negative UA and negative x-ray  Could consider silent aspiration as potential source of infection; however, imaging appears negative currently  Discontineud ceftriaxone after 3 day course  DAYAN (acute kidney injury) (HCC)-resolved as of 11/6/2022  Assessment & Plan  -see rhabdomyolysis section    Hypernatremia-resolved as of 11/30/2022  Assessment & Plan  On presentation: sodium 151 -> progressively improved with normalization on 11/25  Appreciate nephrology input  Encourage increased oral intake  Continue serum sodium monitoring    Rhabdomyolysis-resolved as of 11/9/2022  Assessment & Plan  -CPK > 7000 on admission with DAYAN  -in setting of minimal ambulation and has been going up the stairs by crawling in using his buttocks    CPK significant improved   DAYAN resolved     Supratherapeutic INR-resolved as of 11/9/2022  Assessment & Plan  Increased increased to 9 has history of antiphospholipid syndrome and AFib  -no evidence of bleeding  Plan  · > as INR mildly elevated and no source of bleeding, will hold off giving any reversal agents of supratherapeutic INR especially given patient's history of antiphospholipid syndrome and history of 3 previous CVA as well as having ischemic   · likely etiology of of elevated INR is multifactorial   Suspect poor nutrition in conjunction with vitamin K antagonism of Coumadin as etiology of elevated INR  Patient INR trending downward with holding of Coumadin  Given INR is less than 10 and history of hypercoagulable state in the setting of stroke history would prefer to monitor and trend off of Coumadin  continue warfarin , bridge with lovenox until therapeutic        VTE Pharmacologic Prophylaxis: VTE Score: 3 Moderate Risk (Score 3-4) - Pharmacological DVT Prophylaxis Ordered: enoxaparin (Lovenox)      Patient Centered Rounds: I performed bedside rounds with nursing staff today  Discussions with Specialists or Other Care Team Provider: gi    Education and Discussions with Family / Patient: Updated  (sister) at bedside  Time Spent for Care: 30 minutes  More than 50% of total time spent on counseling and coordination of care as described above  Current Length of Stay: 28 day(s)  Current Patient Status: Inpatient   Certification Statement: The patient will continue to require additional inpatient hospital stay due to diarrhea  Discharge Plan: Anticipate discharge in 24-48 hrs to rehab facility  Code Status: Level 3 - DNAR and DNI    Subjective:   Patient seen examined at bedside  No acute events overnight  Patient stating that he is tired of sitting around the hospital an wants to be discharged  Objective:     Vitals:   Temp (24hrs), Av 3 °F (36 3 °C), Min:97 °F (36 1 °C), Max:97 5 °F (36 4 °C)    Temp:  [97 °F (36 1 °C)-97 5 °F (36 4 °C)] 97 °F (36 1 °C)  HR:  [89] 89  Resp:  [16-18] 16  BP: (136-162)/() 162/101  SpO2:  [98 %] 98 %  Body mass index is 28 86 kg/m²  Input and Output Summary (last 24 hours): Intake/Output Summary (Last 24 hours) at 2022 1509  Last data filed at 2022 1300  Gross per 24 hour   Intake 690 ml   Output 1000 ml   Net -310 ml       Physical Exam:   Physical Exam  Vitals reviewed  Constitutional:       Comments: Chronically ill-appearing   HENT:      Head: Normocephalic and atraumatic  Right Ear: External ear normal       Left Ear: External ear normal       Nose: Nose normal       Mouth/Throat:      Mouth: Mucous membranes are moist       Pharynx: Oropharynx is clear  Eyes:      Extraocular Movements: Extraocular movements intact  Cardiovascular:      Rate and Rhythm: Normal rate and regular rhythm  Heart sounds: Normal heart sounds  Pulmonary:      Effort: Pulmonary effort is normal       Breath sounds: Normal breath sounds     Abdominal: General: Abdomen is flat  Palpations: Abdomen is soft  Tenderness: There is no abdominal tenderness  Comments: Rectal tube noted with loose stool   Musculoskeletal:      Cervical back: Normal range of motion  Right lower leg: No edema  Left lower leg: No edema  Skin:     General: Skin is warm and dry  Neurological:      Mental Status: He is alert  Mental status is at baseline        Comments: Left upper extremity tremor   Psychiatric:         Mood and Affect: Mood normal          Behavior: Behavior normal           Additional Data:     Labs:  Results from last 7 days   Lab Units 11/30/22  0521 11/29/22  0455   WBC Thousand/uL 7 75 6 28   HEMOGLOBIN g/dL 11 4* 11 1*   HEMATOCRIT % 34 0* 33 9*   PLATELETS Thousands/uL 164 151   NEUTROS PCT %  --  79*   LYMPHS PCT %  --  11*   MONOS PCT %  --  7   EOS PCT %  --  2     Results from last 7 days   Lab Units 12/01/22  0659   SODIUM mmol/L 138   POTASSIUM mmol/L 3 9   CHLORIDE mmol/L 113*   CO2 mmol/L 17*   BUN mg/dL 9   CREATININE mg/dL 0 53*   ANION GAP mmol/L 8   CALCIUM mg/dL 8 8   ALBUMIN g/dL 2 1*   GLUCOSE RANDOM mg/dL 102     Results from last 7 days   Lab Units 11/30/22  0521   INR  1 31*                   Lines/Drains:  Invasive Devices     Peripheral Intravenous Line  Duration           Peripheral IV 11/27/22 Left;Ventral (anterior) Wrist 4 days          Drain  Duration           Rectal Tube With balloon 7 days                      Imaging: Reviewed radiology reports from this admission including: CT head    Recent Cultures (last 7 days):   Results from last 7 days   Lab Units 11/27/22  1812   C DIFF TOXIN B BY PCR  Negative       Last 24 Hours Medication List:   Current Facility-Administered Medications   Medication Dose Route Frequency Provider Last Rate   • busPIRone  5 mg Oral BID Gen Colunga MD     • vitamin B-12  500 mcg Oral Daily Tana Lott DO     • cyanocobalamin  1,000 mcg Intramuscular Q30 Days Tana Lott, DO     • enoxaparin  1 mg/kg Subcutaneous Q12H Jonathan Gentile MD     • escitalopram  10 mg Oral Daily Jena Salter MD     • fluticasone  1 spray Each Nare Daily Tana Lott DO     • folic acid  1 mg Oral Daily Morro Dunne MD     • loperamide  2 mg Oral TID Gustabo Mcgee DO     • LORazepam  0 5 mg Oral BID Zeeshan Carter MD     • metoprolol  5 mg Intravenous Q6H PRN Binu Bang MD     • metoprolol succinate  100 mg Oral Daily Zeeshan Carter MD     • multivitamin stress formula  1 tablet Oral Daily Morro Dunne MD     • pantoprazole  40 mg Oral Early Morning Tana Lott DO     • potassium chloride  40 mEq Oral BID Zeeshan Carter MD     • sodium chloride  1 spray Each Nare Q1H PRN Zeeshan Carter MD     • sodium chloride 0 9 % with KCl 40 mEq/L  50 mL/hr Intravenous Continuous Gustabo Mcgee DO 50 mL/hr (22)   • thiamine  100 mg Oral Daily Morro Dunne MD     • warfarin  10 mg Oral QPM Gustabo Mcgee DO          Today, Patient Was Seen By: Jinger Apgar, DO    **Please Note: This note may have been constructed using a voice recognition system  **

## 2022-12-01 NOTE — ASSESSMENT & PLAN NOTE
Medications reviewed -> Senokot previously discontinued  Diarrhea has been waxing/waning but persistent over the last several days despite PRN Imodium ordered previously  Continue IV fluid hydration monitor/replete electrolyte deficiencies if present  Rectal tube remains in place  Appreciated gastroenterology input ->c diff negative, giardia negative, ova and parasite negative ->   11/31: Started standing imodium 2 mg tid standing as infectious work up was negative    12/1: continues to have diarrhea, discussed with gi, continue current regiment and reassess tomorrow  Monitor stool output

## 2022-12-01 NOTE — ASSESSMENT & PLAN NOTE
Previous admission from the end of September until the end of October at [facility] in which he was hospitalized for over a month for severe anxiety and inability to care for self  Per sister reportedly was able to ambulate prior to the hospitalization but after the hospitalization patient needing assistance with ambulation at home  During hospitalization there patient developed hyponatremia during his course of stay and Zoloft that had been started was stopped due to hyponatremia  Recently discharged from inpatient psychiatry with mirtazapine and aripiprazole, discussed resumption of thesewith the patient but he reports excess sedation with these   D/w psychiatry earlier this week started buspar 5mg BID, and lexapro  He does admit to depressive symptoms and anxiety, discussing certain topics with him result is worsening tremor  Monitor response to antidepressants  Reconsulted psychiatry for re-evaluation / possible IP psych admission for [facility] [place] psychiat[facility] on 11/18 recommended inpatient psychiatry admission, in-person evaluation with our on-site psychiatrist 11/21 did not feel this was necessary  Recommended increasing escitalopram, making lorazepam scheduled as patient does not ask for it but his anxiety might benefit from it, and continuing BusPar  Overall plan is to help patient improve his anxiety so that he can get to rehab    Per updated discussion w/ psychiatry -> continue current Lexapro/Buspar/Ativan regimen -> okay for discharge to skilled rehab from a psychiatric point of view -> awaiting placement, however, patient likely undergoing "OPTIONS" process prior to skilled rehab placement due to underlying psychiatric history

## 2022-12-01 NOTE — PLAN OF CARE
Problem: PAIN - ADULT  Goal: Verbalizes/displays adequate comfort level or baseline comfort level  Description: Interventions:  - Encourage patient to monitor pain and request assistance  - Assess pain using appropriate pain scale  - Administer analgesics based on type and severity of pain and evaluate response  - Implement non-pharmacological measures as appropriate and evaluate response  - Consider cultural and social influences on pain and pain management  - Notify physician/advanced practitioner if interventions unsuccessful or patient reports new pain  Outcome: Progressing     Problem: SAFETY ADULT  Goal: Patient will remain free of falls  Description: INTERVENTIONS:  - Educate patient/family on patient safety including physical limitations  - Instruct patient to call for assistance with activity   - Consult OT/PT to assist with strengthening/mobility   - Keep Call bell within reach  - Keep bed low and locked with side rails adjusted as appropriate  - Keep care items and personal belongings within reach  - Initiate and maintain comfort rounds  - Make Fall Risk Sign visible to staff  - Apply yellow socks and bracelet for high fall risk patients  - Consider moving patient to room near nurses station  Outcome: Progressing     Problem: MOBILITY - ADULT  Goal: Maintain or return to baseline ADL function  Description: INTERVENTIONS:  -  Assess patient's ability to carry out ADLs; assess patient's baseline for ADL function and identify physical deficits which impact ability to perform ADLs (bathing, care of mouth/teeth, toileting, grooming, dressing, etc )  - Assess/evaluate cause of self-care deficits   - Assess range of motion  - Assess patient's mobility; develop plan if impaired  - Assess patient's need for assistive devices and provide as appropriate  - Encourage maximum independence but intervene and supervise when necessary  - Involve family in performance of ADLs  - Assess for home care needs following discharge   - Consider OT consult to assist with ADL evaluation and planning for discharge  - Provide patient education as appropriate  Outcome: Progressing

## 2022-12-02 LAB
ALBUMIN SERPL BCP-MCNC: 2 G/DL (ref 3.5–5)
ANION GAP SERPL CALCULATED.3IONS-SCNC: 5 MMOL/L (ref 4–13)
BUN SERPL-MCNC: 9 MG/DL (ref 5–25)
CALCIUM ALBUM COR SERPL-MCNC: 10.5 MG/DL (ref 8.3–10.1)
CALCIUM SERPL-MCNC: 8.9 MG/DL (ref 8.3–10.1)
CHLORIDE SERPL-SCNC: 111 MMOL/L (ref 96–108)
CO2 SERPL-SCNC: 24 MMOL/L (ref 21–32)
CREAT SERPL-MCNC: 0.53 MG/DL (ref 0.6–1.3)
ERYTHROCYTE [DISTWIDTH] IN BLOOD BY AUTOMATED COUNT: 17 % (ref 11.6–15.1)
GFR SERPL CREATININE-BSD FRML MDRD: 113 ML/MIN/1.73SQ M
GLUCOSE SERPL-MCNC: 104 MG/DL (ref 65–140)
HCT VFR BLD AUTO: 33.6 % (ref 36.5–49.3)
HGB BLD-MCNC: 10.7 G/DL (ref 12–17)
INR PPP: 1.72 (ref 0.84–1.19)
MAGNESIUM SERPL-MCNC: 2 MG/DL (ref 1.6–2.6)
MCH RBC QN AUTO: 31.9 PG (ref 26.8–34.3)
MCHC RBC AUTO-ENTMCNC: 31.8 G/DL (ref 31.4–37.4)
MCV RBC AUTO: 100 FL (ref 82–98)
PHOSPHATE SERPL-MCNC: 3 MG/DL (ref 2.3–4.1)
PLATELET # BLD AUTO: 175 THOUSANDS/UL (ref 149–390)
PMV BLD AUTO: 9.9 FL (ref 8.9–12.7)
POTASSIUM SERPL-SCNC: 4.1 MMOL/L (ref 3.5–5.3)
PROTHROMBIN TIME: 20.4 SECONDS (ref 11.6–14.5)
RBC # BLD AUTO: 3.35 MILLION/UL (ref 3.88–5.62)
SODIUM SERPL-SCNC: 140 MMOL/L (ref 135–147)
WBC # BLD AUTO: 6.7 THOUSAND/UL (ref 4.31–10.16)

## 2022-12-02 RX ORDER — CHOLESTYRAMINE LIGHT 4 G/5.7G
4 POWDER, FOR SUSPENSION ORAL 2 TIMES DAILY
Status: DISCONTINUED | OUTPATIENT
Start: 2022-12-02 | End: 2022-12-08

## 2022-12-02 RX ORDER — LOPERAMIDE HYDROCHLORIDE 2 MG/1
2 CAPSULE ORAL 3 TIMES DAILY
Status: COMPLETED | OUTPATIENT
Start: 2022-12-02 | End: 2022-12-04

## 2022-12-02 RX ADMIN — THIAMINE HCL TAB 100 MG 100 MG: 100 TAB at 08:42

## 2022-12-02 RX ADMIN — METOPROLOL SUCCINATE 100 MG: 100 TABLET, EXTENDED RELEASE ORAL at 08:42

## 2022-12-02 RX ADMIN — SODIUM BICARBONATE 650 MG TABLET 1300 MG: at 08:42

## 2022-12-02 RX ADMIN — BUSPIRONE HYDROCHLORIDE 5 MG: 5 TABLET ORAL at 08:42

## 2022-12-02 RX ADMIN — POTASSIUM CHLORIDE AND SODIUM CHLORIDE 50 ML/HR: 900; 300 INJECTION, SOLUTION INTRAVENOUS at 21:37

## 2022-12-02 RX ADMIN — POTASSIUM CHLORIDE 40 MEQ: 1500 TABLET, EXTENDED RELEASE ORAL at 17:18

## 2022-12-02 RX ADMIN — CHOLESTYRAMINE 4 G: 4 POWDER, FOR SUSPENSION ORAL at 21:37

## 2022-12-02 RX ADMIN — LORAZEPAM 0.5 MG: 0.5 TABLET ORAL at 08:42

## 2022-12-02 RX ADMIN — ENOXAPARIN SODIUM 90 MG: 100 INJECTION SUBCUTANEOUS at 08:43

## 2022-12-02 RX ADMIN — LOPERAMIDE HYDROCHLORIDE 2 MG: 2 CAPSULE ORAL at 08:42

## 2022-12-02 RX ADMIN — B-COMPLEX W/ C & FOLIC ACID TAB 1 TABLET: TAB at 08:42

## 2022-12-02 RX ADMIN — LOPERAMIDE HYDROCHLORIDE 2 MG: 2 CAPSULE ORAL at 17:19

## 2022-12-02 RX ADMIN — FLUTICASONE PROPIONATE 1 SPRAY: 50 SPRAY, METERED NASAL at 08:48

## 2022-12-02 RX ADMIN — CYANOCOBALAMIN TAB 500 MCG 500 MCG: 500 TAB at 08:42

## 2022-12-02 RX ADMIN — WARFARIN SODIUM 10 MG: 5 TABLET ORAL at 17:18

## 2022-12-02 RX ADMIN — FOLIC ACID 1 MG: 1 TABLET ORAL at 08:42

## 2022-12-02 RX ADMIN — LORAZEPAM 0.5 MG: 0.5 TABLET ORAL at 17:18

## 2022-12-02 RX ADMIN — BUSPIRONE HYDROCHLORIDE 5 MG: 5 TABLET ORAL at 17:18

## 2022-12-02 RX ADMIN — POTASSIUM CHLORIDE 40 MEQ: 1500 TABLET, EXTENDED RELEASE ORAL at 08:42

## 2022-12-02 RX ADMIN — ESCITALOPRAM OXALATE 10 MG: 10 TABLET ORAL at 08:42

## 2022-12-02 RX ADMIN — ENOXAPARIN SODIUM 90 MG: 100 INJECTION SUBCUTANEOUS at 21:36

## 2022-12-02 RX ADMIN — PANTOPRAZOLE SODIUM 40 MG: 40 TABLET, DELAYED RELEASE ORAL at 06:16

## 2022-12-02 RX ADMIN — LOPERAMIDE HYDROCHLORIDE 2 MG: 2 CAPSULE ORAL at 21:36

## 2022-12-02 NOTE — PLAN OF CARE
Problem: Potential for Falls  Goal: Patient will remain free of falls  Description: INTERVENTIONS:  - Educate patient/family on patient safety including physical limitations  - Instruct patient to call for assistance with activity   - Consult OT/PT to assist with strengthening/mobility   - Keep Call bell within reach  - Keep bed low and locked with side rails adjusted as appropriate  - Keep care items and personal belongings within reach  - Initiate and maintain comfort rounds  - Make Fall Risk Sign visible to staff  - Apply yellow socks and bracelet for high fall risk patients  - Consider moving patient to room near nurses station  Outcome: Progressing     Problem: PAIN - ADULT  Goal: Verbalizes/displays adequate comfort level or baseline comfort level  Description: Interventions:  - Encourage patient to monitor pain and request assistance  - Assess pain using appropriate pain scale  - Administer analgesics based on type and severity of pain and evaluate response  - Implement non-pharmacological measures as appropriate and evaluate response  - Consider cultural and social influences on pain and pain management  - Notify physician/advanced practitioner if interventions unsuccessful or patient reports new pain  Outcome: Progressing     Problem: SAFETY ADULT  Goal: Patient will remain free of falls  Description: INTERVENTIONS:  - Educate patient/family on patient safety including physical limitations  - Instruct patient to call for assistance with activity   - Consult OT/PT to assist with strengthening/mobility   - Keep Call bell within reach  - Keep bed low and locked with side rails adjusted as appropriate  - Keep care items and personal belongings within reach  - Initiate and maintain comfort rounds  - Make Fall Risk Sign visible to staff  - Apply yellow socks and bracelet for high fall risk patients  - Consider moving patient to room near nurses station  Outcome: Progressing  Goal: Maintain or return to baseline ADL function  Description: INTERVENTIONS:  -  Assess patient's ability to carry out ADLs; assess patient's baseline for ADL function and identify physical deficits which impact ability to perform ADLs (bathing, care of mouth/teeth, toileting, grooming, dressing, etc )  - Assess/evaluate cause of self-care deficits   - Assess range of motion  - Assess patient's mobility; develop plan if impaired  - Assess patient's need for assistive devices and provide as appropriate  - Encourage maximum independence but intervene and supervise when necessary  - Involve family in performance of ADLs  - Assess for home care needs following discharge   - Consider OT consult to assist with ADL evaluation and planning for discharge  - Provide patient education as appropriate  Outcome: Progressing  Goal: Maintains/Returns to pre admission functional level  Description: INTERVENTIONS:  - Perform BMAT or MOVE assessment daily    - Set and communicate daily mobility goal to care team and patient/family/caregiver     - Collaborate with rehabilitation services on mobility goals if consulted  - Record patient progress and toleration of activity level   Outcome: Progressing     Problem: MOBILITY - ADULT  Goal: Maintain or return to baseline ADL function  Description: INTERVENTIONS:  -  Assess patient's ability to carry out ADLs; assess patient's baseline for ADL function and identify physical deficits which impact ability to perform ADLs (bathing, care of mouth/teeth, toileting, grooming, dressing, etc )  - Assess/evaluate cause of self-care deficits   - Assess range of motion  - Assess patient's mobility; develop plan if impaired  - Assess patient's need for assistive devices and provide as appropriate  - Encourage maximum independence but intervene and supervise when necessary  - Involve family in performance of ADLs  - Assess for home care needs following discharge   - Consider OT consult to assist with ADL evaluation and planning for discharge  - Provide patient education as appropriate  Outcome: Progressing  Goal: Maintains/Returns to pre admission functional level  Description: INTERVENTIONS:  - Perform BMAT or MOVE assessment daily    - Set and communicate daily mobility goal to care team and patient/family/caregiver  - Collaborate with rehabilitation services on mobility goals if consulted  - Record patient progress and toleration of activity level   Outcome: Progressing     Problem: Prexisting or High Potential for Compromised Skin Integrity  Goal: Skin integrity is maintained or improved  Description: INTERVENTIONS:  - Identify patients at risk for skin breakdown  - Assess and monitor skin integrity  - Assess and monitor nutrition and hydration status  - Monitor labs   - Assess for incontinence   - Turn and reposition patient  - Assist with mobility/ambulation  - Relieve pressure over bony prominences  - Avoid friction and shearing  - Provide appropriate hygiene as needed including keeping skin clean and dry  - Evaluate need for skin moisturizer/barrier cream  - Collaborate with interdisciplinary team   - Patient/family teaching  - Consider wound care consult   Outcome: Progressing     Problem: Nutrition/Hydration-ADULT  Goal: Nutrient/Hydration intake appropriate for improving, restoring or maintaining nutritional needs  Description: Monitor and assess patient's nutrition/hydration status for malnutrition  Collaborate with interdisciplinary team and initiate plan and interventions as ordered  Monitor patient's weight and dietary intake as ordered or per policy  Utilize nutrition screening tool and intervene as necessary  Determine patient's food preferences and provide high-protein, high-caloric foods as appropriate       INTERVENTIONS:  - Monitor oral intake, urinary output, labs, and treatment plans  - Assess nutrition and hydration status and recommend course of action  - Evaluate amount of meals eaten  - Assist patient with eating if necessary   - Allow adequate time for meals  - Recommend/ encourage appropriate diets, oral nutritional supplements, and vitamin/mineral supplements  - Order, calculate, and assess calorie counts as needed  - Recommend, monitor, and adjust tube feedings and TPN/PPN based on assessed needs  - Assess need for intravenous fluids  - Provide specific nutrition/hydration education as appropriate  - Include patient/family/caregiver in decisions related to nutrition  Outcome: Progressing

## 2022-12-02 NOTE — PLAN OF CARE
Problem: OCCUPATIONAL THERAPY ADULT  Goal: Performs self-care activities at highest level of function for planned discharge setting  See evaluation for individualized goals  Description: Treatment Interventions: ADL retraining, Functional transfer training, UE strengthening/ROM, Endurance training, Patient/family training, Cognitive reorientation, Equipment evaluation/education, Compensatory technique education, Continued evaluation, Energy conservation, Activityengagement          See flowsheet documentation for full assessment, interventions and recommendations  12/2/2022 1539 by Allison Alberts OT  Outcome: Progressing  Note: Limitation: Decreased ADL status, Decreased endurance, Decreased cognition, Decreased self-care trans, Decreased high-level ADLs  Prognosis: Fair  Assessment: Patient participated in Skilled OT session this date with interventions consisting of ADL re training with the use of correct body mechnaics, Energy Conservation techniques, safety awareness and fall prevention techniques,  therapeutic activities to: increase activity tolerance, increase dynamic sit/ stand balance during functional activity  and increase OOB/ sitting tolerance   Upon arrival patient was found seated OOB to Chair  Pt demonstrated the following tasks: MOD A x 2 STS and fnxl mobility with RW, pt performs shaving with MOD A standing at sink with additional assist to maintain stance, S to perform oral care in seated position  Patient continues to be functioning below baseline level, occupational performance remains limited secondary to factors listed above and increased risk for falls and injury  From OT standpoint, recommendation at time of d/c would be STR  Patient to benefit from continued Occupational Therapy treatment while in the hospital to address deficits as defined above and maximize level of functional independence with ADLs and functional mobility   Pt was left in chair with alarm on after session with all current needs met  The patient's raw score on the AM-PAC Daily Activity inpatient short form is 14, standardized score is 33 39, less than 39 4  Patients at this level are likely to benefit from discharge to post-acute rehabilitation services  Please refer to the recommendation of the Occupational Therapist for safe discharge planning  OT Discharge Recommendation: Post acute rehabilitation services       12/2/2022 1539 by Sofie Whalen OT  Outcome: Progressing  Note: Limitation: Decreased ADL status, Decreased endurance, Decreased cognition, Decreased self-care trans, Decreased high-level ADLs  Prognosis: Fair  Assessment: Patient participated in Skilled OT session this date with interventions consisting of ADL re training with the use of correct body mechnaics, Energy Conservation techniques, safety awareness and fall prevention techniques,  therapeutic activities to: increase activity tolerance, increase dynamic sit/ stand balance during functional activity  and increase OOB/ sitting tolerance   Upon arrival patient was found seated OOB to Chair  Pt demonstrated the following tasks: MOD A x 2 STS and fnxl mobility with RW, pt performs shaving with MOD A standing at sink with additional assist to maintain stance, S to perform oral care in seated position  Patient continues to be functioning below baseline level, occupational performance remains limited secondary to factors listed above and increased risk for falls and injury  From OT standpoint, recommendation at time of d/c would be STR  Patient to benefit from continued Occupational Therapy treatment while in the hospital to address deficits as defined above and maximize level of functional independence with ADLs and functional mobility  Pt was left in chair with alarm on after session with all current needs met  The patient's raw score on the AM-PAC Daily Activity inpatient short form is 14, standardized score is 33 39, less than 39 4   Patients at this level are likely to benefit from discharge to post-acute rehabilitation services  Please refer to the recommendation of the Occupational Therapist for safe discharge planning       OT Discharge Recommendation: Post acute rehabilitation services

## 2022-12-02 NOTE — ASSESSMENT & PLAN NOTE
H/o CVA x 3 in setting of antiphospholipid syndrome  On Coumadin for anticoagulation  CT head in ER: No acute intracranial abnormality   Encephalomalacia involving the right occipital lobe and bilateral cerebelli   Grossly stable appearance of periventricular hypoattenuation compared to MRI brain 8/29/2022, likely representing chronic microvascular ischemic changes    Awaiting placement to SNF for rehabilitation case management following

## 2022-12-02 NOTE — ASSESSMENT & PLAN NOTE
Previous admission from the end of September until the end of October at [facility] in which he was hospitalized for over a month for severe anxiety and inability to care for self  Per sister reportedly was able to ambulate prior to the hospitalization but after the hospitalization patient needing assistance with ambulation at home  During hospitalization there patient developed hyponatremia during his course of stay and Zoloft that had been started was stopped due to hyponatremia  Recently discharged from inpatient psychiatry with mirtazapine and aripiprazole   psychiatry started buspar 5mg BID, and lexapro  Reconsulted psychiatry for re-evaluation / possible IP psych admission for [address] [place] [address] on 11/18 recommended inpatient psychiatry admission, in-person evaluation with our on-site psychiatrist 11/21 did not feel this was necessary  Recommended increasing escitalopram, making lorazepam scheduled as patient does not ask for it but his anxiety might benefit from it, and continuing BusPar  Overall plan is to help patient improve his anxiety so that he can get to rehab    Per updated discussion w/ psychiatry -> continue current Lexapro/Buspar/Ativan regimen -> okay for discharge to skilled rehab from a  awaiting placement - case management following

## 2022-12-02 NOTE — OCCUPATIONAL THERAPY NOTE
Occupational Therapy Progress Note     Patient Name: Ophelia Becker  NCWEW'J Date: 12/2/2022  Problem List  Principal Problem:    SHIMON (generalized anxiety disorder)  Active Problems:    Atrial fibrillation    Antiphospholipid antibody with hypercoagulable state     Tremor of right hand    History of CVA (cerebrovascular accident)    Multiple electrolyte abnormalities    Severe protein-calorie malnutrition     Hyperthyroidism    Cognitive impairment    Diarrhea          12/02/22 1150   OT Last Visit   OT Visit Date 12/02/22   Note Type   Note Type Treatment   Pain Assessment   Pain Assessment Tool FLACC   Pain Location/Orientation Location: Buttocks  (c/o rectal tube)   Hospital Pain Intervention(s) Repositioned; Ambulation/increased activity; Emotional support   Pain Rating: FLACC (Rest) - Face 1   Pain Rating: FLACC (Rest) - Legs 1   Pain Rating: FLACC (Rest) - Activity 1   Pain Rating: FLACC (Rest) - Cry 0   Pain Rating: FLACC (Rest) - Consolability 1   Score: FLACC (Rest) 4   Pain Rating: FLACC (Activity) - Face 1   Pain Rating: FLACC (Activity) - Legs 1   Pain Rating: FLACC (Activity) - Activity 1   Pain Rating: FLACC (Activity) - Cry 1   Pain Rating: FLACC (Activity) - Consolability 1   Score: FLACC (Activity) 5   Restrictions/Precautions   Other Precautions Cognitive; Chair Alarm; Bed Alarm;Multiple lines; Fall Risk;Pain   Lifestyle   Autonomy Per EMR, at baseline pt is I with ADLS and mobility     Reciprocal Relationships Sister   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   Intrinsic Gratification Building model cars and walking   ADL   Where Assessed Standing at sink   Grooming Assistance 4  Minimal Assistance   Grooming Deficit Shaving   Grooming Comments MOD A for shaving standing at sink, S for oral care   Bed Mobility   Supine to Sit Unable to assess   Sit to Supine Unable to assess   Additional Comments Pt seated OOB in chair upon arrival   Transfers   Sit to Stand 3  Moderate assistance Additional items Assist x 2; Increased time required   Stand to Sit 3  Moderate assistance   Additional items Assist x 2; Increased time required   Additional Comments transfers with RW   Functional Mobility   Functional Mobility 3  Moderate assistance   Additional Comments Ax2 - increased VCs   Additional items Rolling walker   Cognition   Overall Cognitive Status Impaired   Arousal/Participation Responsive; Cooperative   Attention Attends with cues to redirect   Orientation Level Oriented to place;Oriented to person;Oriented to time   Memory Decreased short term memory   Following Commands Follows one step commands with increased time or repetition   Comments Pt noted with increased anxiety, reports increased discomfort 2* rectal tube  Anxiety decreased as session progressed + with approach   Activity Tolerance   Activity Tolerance Patient limited by fatigue  (anxiety)   Medical Staff Made Aware PT Nichole Casey, HERON   Assessment   Assessment Patient participated in Skilled OT session this date with interventions consisting of ADL re training with the use of correct body mechnaics, Energy Conservation techniques, safety awareness and fall prevention techniques,  therapeutic activities to: increase activity tolerance, increase dynamic sit/ stand balance during functional activity  and increase OOB/ sitting tolerance   Upon arrival patient was found seated OOB to Chair  Pt demonstrated the following tasks: MOD A x 2 STS and fnxl mobility with RW, pt performs shaving with MOD A standing at sink with additional assist to maintain stance, S to perform oral care in seated position  Patient continues to be functioning below baseline level, occupational performance remains limited secondary to factors listed above and increased risk for falls and injury  From OT standpoint, recommendation at time of d/c would be STR    Patient to benefit from continued Occupational Therapy treatment while in the hospital to address deficits as defined above and maximize level of functional independence with ADLs and functional mobility  Pt was left in chair with alarm on after session with all current needs met  The patient's raw score on the AM-PAC Daily Activity inpatient short form is 14, standardized score is 33 39, less than 39 4  Patients at this level are likely to benefit from discharge to post-acute rehabilitation services  Please refer to the recommendation of the Occupational Therapist for safe discharge planning  Plan   Treatment Interventions ADL retraining;Functional transfer training;UE strengthening/ROM; Endurance training;Cognitive reorientation;Patient/family training;Equipment evaluation/education; Compensatory technique education;Continued evaluation; Energy conservation; Activityengagement   Goal Expiration Date 12/21/22   OT Treatment Day 10   OT Frequency   (2-4x/wk)   Recommendation   OT Discharge Recommendation Post acute rehabilitation services   [facility] Daily Activity Inpatient   Lower Body Dressing 2   Bathing 2   Toileting 1   Upper Body Dressing 3   Grooming 3   Eating 3   Daily Activity Raw Score 14   Daily Activity Standardized Score (Calc for Raw Score >=11) 33 39   AM-[facility] Applied Cognition Inpatient   Following a Speech/Presentation 3   Understanding Ordinary Conversation 4   Taking Medications 3   Remembering Where Things Are Placed or Put Away 2   Remembering List of 4-5 Errands 2   Taking Care of Complicated Tasks 1   Applied Cognition Raw Score 15   Applied Cognition Standardized Score 33 54     Tammy Hartman MS, OTR/L

## 2022-12-02 NOTE — ASSESSMENT & PLAN NOTE
Per chart review patient has had low TSH and elevated free T4 in the past   Most recent labs during this admission with TSH 0 282 and free t4 1 56  Thyroid U/S during last admission at [facility] was unremarkable   Evaluated by endocrinology in the past during previous admission at [facility] (Oct 22) and they recommend get repeat TFT, TSI and thyroid abs   Endocrinology input noted patient likely subclinical hypothyroidism  Outpatient endocrinology follow-up recommended

## 2022-12-02 NOTE — ASSESSMENT & PLAN NOTE
Ongoing watery diarrhea  Stool C diff PCR negative  Stool studies negative  GI input noted  Rectal tube in place

## 2022-12-02 NOTE — PHYSICAL THERAPY NOTE
PHYSICAL THERAPY NOTE          Patient Name: Rohan Ortiz  OYVJM'K Date: 12/2/2022 12/02/22 1149   PT Last Visit   PT Visit Date 12/02/22   Note Type   Note Type Treatment  (co-session with OT 2* increased medical complexity and multiple co-morbidiites  PT focused on functional transfers, standing tolerance/ balance, gait training and therex)   Pain Assessment   Pain Assessment Tool FLACC   Pain Location/Orientation Location: Buttocks  (rectal tube)   Hospital Pain Intervention(s) Repositioned; Ambulation/increased activity; Emotional support   Pain Rating: FLACC (Rest) - Face 1   Pain Rating: FLACC (Rest) - Legs 0   Pain Rating: FLACC (Rest) - Activity 1   Pain Rating: FLACC (Rest) - Cry 0   Pain Rating: FLACC (Rest) - Consolability 0   Score: FLACC (Rest) 2   Pain Rating: FLACC (Activity) - Face 1   Pain Rating: FLACC (Activity) - Legs 0   Pain Rating: FLACC (Activity) - Activity 1   Pain Rating: FLACC (Activity) - Cry 0   Pain Rating: FLACC (Activity) - Consolability 0   Score: FLACC (Activity) 2   Restrictions/Precautions   Weight Bearing Precautions Per Order No   Other Precautions Cognitive; Chair Alarm; Bed Alarm;Multiple lines; Fall Risk;Pain  (rectal tube, L UE tremor)   General   Chart Reviewed Yes   Response to Previous Treatment Patient with no complaints from previous session  Family/Caregiver Present No   Cognition   Overall Cognitive Status Impaired   Arousal/Participation Responsive; Cooperative   Attention Attends with cues to redirect   Memory Decreased short term memory;Decreased recall of recent events   Following Commands Follows one step commands with increased time or repetition   Comments Pt fixated on rectal tube  Initially responding "no" to all quesitons however cooperative to participate  Increased anxiety noted this date   calmer as session progresses   Bed Mobility   Supine to Sit Unable to assess Sit to Supine Unable to assess   Additional Comments pt OOB in recliner upon arrival  Pt returned to sitting OOB in recliner with all needs wihtin reach + alarm on   Transfers   Sit to Stand 3  Moderate assistance   Additional items Assist x 2; Increased time required;Verbal cues   Stand to Sit 3  Moderate assistance   Additional items Assist x 2; Increased time required;Verbal cues   Additional Comments transfers with RW, cues for hand placement and sequencing   Ambulation/Elevation   Gait pattern Excessively slow; Short stride; Inconsistent lucien; Foward flexed;Decreased foot clearance; Improper Weight shift;Narrow TEE   Gait Assistance 3  Moderate assist   Additional items Assist x 2;Verbal cues; Tactile cues  (+ close chair follow)   Assistive Device Rolling walker   Distance 10ft, 2ft   Balance   Static Sitting Fair   Dynamic Sitting Fair -   Static Standing Poor +   Dynamic Standing Poor   Ambulatory Poor   Endurance Deficit   Endurance Deficit Yes   Endurance Deficit Description cognition, generalized weakness, deconditioning   Activity Tolerance   Activity Tolerance Patient limited by fatigue; Other (Comment)  (+ cognition)   Nurse Made Aware RN cleared pt to participate in therapy session   Exercises   Knee AROM Long Arc Quad Sitting;Bilateral;10 reps;AROM  (x2 sets)   Ankle Pumps Sitting;Bilateral;10 reps;AROM  (x2 sets)   Marching Sitting;Bilateral;10 reps;AROM  (x 2sets)   Balance training  pt static standing with mod Ax1 using RW while performing shaving with OT ~4-5 min  Addiontal bout of static standing with RW and mod A ~2 min   Assessment   Prognosis Guarded   Problem List Decreased strength;Decreased range of motion;Decreased endurance; Impaired balance;Decreased mobility; Decreased cognition; Impaired judgement;Decreased safety awareness;Pain   Assessment Pt seen for PT treatment session this date   Therapy session focused on functional transfers, gait training, standing tolerance/ balance, therapeutic exercise in order to improve overall mobility and independence  Pt requires assist of 2 for all mobility performed this date  Pt with increased anxiousness noted this date, pt fixated on rectal tube  Pt re-direction with transfers + ambulation  Pt ambulated a total of ~12 ft this date using RW and mod Ax2; pt with very slow lucien + narrow TEE, close chair follow for increased safety  Pt stood with mod A x1 using RW at sink when performing shaving tasks with OT  Pt performed/ toelrated seated therex to b/l LEs, pt engaged in counting out loud  Pt making slow but steady progress toward goals  Pt was left sitting OOB in recliner at the end of PT session with all needs in reach  Pt would benefit from continued PT services while in hospital to address remaining limitations  PT to continue to follow pt and recommends rehab upon d c  The patient's AM-PAC Basic Mobility Inpatient Short Form Raw Score is 8  A Raw score of less than or equal to 16 suggests the patient may benefit from discharge to post-acute rehabilitation services  Please also refer to the recommendation of the Physical Therapist for safe discharge planning  Barriers to Discharge Inaccessible home environment;Decreased caregiver support   Goals   Patient Goals to have a "magic cup" ice cream   STG Expiration Date 12/09/22   PT Treatment Day 11   Plan   Treatment/Interventions Functional transfer training;LE strengthening/ROM; Therapeutic exercise;Patient/family training;Equipment eval/education;Gait training;Spoke to nursing;Spoke to case management;OT   Progress Progressing toward goals   PT Frequency 2-3x/wk   Recommendation   PT Discharge Recommendation Post acute rehabilitation services   Equipment Recommended [address] [facility] Recommended Wheeled walker   AM-PAC Basic Mobility Inpatient   Turning in Bed Without Bedrails 2   Lying on Back to Sitting on Edge of Flat Bed 2   Moving Bed to Chair 1   Standing Up From Chair 1   Walk in Room 1   Climb 3-5 Stairs 1   Basic Mobility Inpatient Raw Score 8   Highest Level Of Mobility   -[facility] Goal 3: Sit at edge of bed   -HLM Achieved 6: Walk 10 steps or more   Education   Education Provided Mobility training;Assistive device   Patient Reinforcement needed   End of Consult   Patient Position at End of Consult Bedside chair;Bed/Chair alarm activated; All needs within reach     Lisa Oropeza, PT, DPT

## 2022-12-02 NOTE — CASE MANAGEMENT
Case Management Discharge Planning Note    Patient name Lilly Edge  Location [address] [place] [address] 907/[facility] [id number] MRN [id number]  : 1960 Date 2022       Current Admission Date: 11/3/2022  Current Admission Diagnosis:SHIMON (generalized anxiety disorder)   Patient Active Problem List    Diagnosis Date Noted   • Diarrhea 2022   • Cognitive impairment 2022   • Hyperthyroidism 11/10/2022   • Severe protein-calorie malnutrition  2022   • Hyponatremia 10/14/2022   • SIADH (syndrome of inappropriate ADH production) ([facility] ) 10/14/2022   • Subclinical hyperthyroidism 10/14/2022   • Sinus arrhythmia 10/10/2022   • Mild protein-calorie malnutrition ([facility] ) 10/07/2022   • Medical clearance for incarceration 2022   • 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 attack 2022   • Atypical chest pain 2022   • Multiple electrolyte abnormalities 2022   • Dizziness 2022   • Renal cyst 2022   • PVD (peripheral vascular disease) ([facility] ) 2022   • Ataxia 2022   • History of CVA (cerebrovascular accident) 2022   • Right inguinal pain 2022   • Alkaline phosphatase elevation 2022   • Low back pain 2022   • S/P laparoscopic hernia repair 2021   • Non-recurrent bilateral inguinal hernia without obstruction or gangrene 2021   • Gastric polyps 2021   • Gastric AVM 2021   • Edema of both lower legs 2021   • Tremor of right hand 2021   • Weakness of both lower extremities 2021   • Anticoagulated on Coumadin 2021   • CORIE (obstructive sleep apnea)    • Hyperbilirubinemia 08/10/2020   • Antiphospholipid antibody with hypercoagulable state  2020   • History of stroke 2020   • Other viral warts 2019   • Physical deconditioning 2019   • Class 2 obesity in adult 2018   • Bright red blood per rectum 11/10/2017   • Numbness 2017   • H/O aortic aneurysm repair 2017   • Hypertension 08/26/2017   • GERD (gastroesophageal reflux disease) 08/26/2017   • Hyperlipidemia 03/17/2017   • Peyronie disease 12/09/2016   • Vitamin D deficiency 06/15/2016   • Atrial fibrillation 11/17/2014   • SHIMON (generalized anxiety disorder) 11/11/2014   • Constipation 09/19/2014   • Irritable bowel syndrome 04/24/2014   • Kidney stones 04/24/2014      LOS (days): 29  Geometric Mean LOS (GMLOS) (days): 5 00  Days to GMLOS:-23 7     OBJECTIVE:  Risk of Unplanned Readmission Score: 37 63         Current admission status: Inpatient   Preferred Pharmacy:   [address] [place] [address] [address] [place] [address]  Phone: [phone] Fax: [phone]    Primary Care Provider: Ayush Burrows MD    Primary Insurance: [address]  Secondary Insurance:     DISCHARGE DETAILS:             Additional Comments: Patient is not medically cleared for discharge at this time  Per provider patient continues with a rectal tube in place

## 2022-12-02 NOTE — ASSESSMENT & PLAN NOTE
Per neuropsychology evaluation on 11/14, patient does NOT have capacity to make medical decisions   Patient also has some level of cognitive impairment   B12 borderline low -> s/p one time intramuscular B12 shot -> continue supplementation

## 2022-12-02 NOTE — PLAN OF CARE
Problem: OCCUPATIONAL THERAPY ADULT  Goal: Performs self-care activities at highest level of function for planned discharge setting  See evaluation for individualized goals  Description: Treatment Interventions: ADL retraining, Functional transfer training, UE strengthening/ROM, Endurance training, Patient/family training, Cognitive reorientation, Equipment evaluation/education, Compensatory technique education, Continued evaluation, Energy conservation, Activityengagement          See flowsheet documentation for full assessment, interventions and recommendations  Outcome: Progressing  Note: Limitation: Decreased ADL status, Decreased endurance, Decreased cognition, Decreased self-care trans, Decreased high-level ADLs  Prognosis: Fair  Assessment: Patient participated in Skilled OT session this date with interventions consisting of ADL re training with the use of correct body mechnaics, Energy Conservation techniques, safety awareness and fall prevention techniques,  therapeutic activities to: increase activity tolerance, increase dynamic sit/ stand balance during functional activity  and increase OOB/ sitting tolerance   Upon arrival patient was found seated OOB to Chair  Pt demonstrated the following tasks: MOD A x 2 STS and fnxl mobility with RW, pt performs shaving with MOD A standing at sink with additional assist to maintain stance, S to perform oral care in seated position  Patient continues to be functioning below baseline level, occupational performance remains limited secondary to factors listed above and increased risk for falls and injury  From OT standpoint, recommendation at time of d/c would be STR  Patient to benefit from continued Occupational Therapy treatment while in the hospital to address deficits as defined above and maximize level of functional independence with ADLs and functional mobility  Pt was left in chair with alarm on after session with all current needs met   The patient's raw score on the AM-PAC Daily Activity inpatient short form is 14, standardized score is 33 39, less than 39 4  Patients at this level are likely to benefit from discharge to post-acute rehabilitation services  Please refer to the recommendation of the Occupational Therapist for safe discharge planning       OT Discharge Recommendation: Post acute rehabilitation services

## 2022-12-02 NOTE — PROGRESS NOTES
[facility]  Progress Note - Teagan Rasmussen 1960, 58 y o  male MRN: [id number]  Unit/Bed#: [facility] 907-01 Encounter: [id number]  Primary Care Provider: Ayush Burrows MD   Date and time admitted to hospital: 11/3/2022  3:28 PM    * SHIMON (generalized anxiety disorder)  Assessment & Plan  Previous admission from the end of September until the end of October at [facility] in which he was hospitalized for over a month for severe anxiety and inability to care for self  Per sister reportedly was able to ambulate prior to the hospitalization but after the hospitalization patient needing assistance with ambulation at home  During hospitalization there patient developed hyponatremia during his course of stay and Zoloft that had been started was stopped due to hyponatremia  Recently discharged from inpatient psychiatry with mirtazapine and aripiprazole   psychiatry started buspar 5mg BID, and lexapro  Reconsulted psychiatry for re-evaluation / possible IP psych admission for [facility] psychiatry on 11/18 recommended inpatient psychiatry admission, in-person evaluation with our on-site psychiatrist 11/21 did not feel this was necessary  Recommended increasing escitalopram, making lorazepam scheduled as patient does not ask for it but his anxiety might benefit from it, and continuing BusPar  Overall plan is to help patient improve his anxiety so that he can get to rehab    Per updated discussion w/ psychiatry -> continue current Lexapro/Buspar/Ativan regimen -> okay for discharge to skilled rehab from a  awaiting placement - case management following    Diarrhea  Assessment & Plan  Ongoing watery diarrhea  Stool C diff PCR negative  Stool studies negative  GI input noted  Rectal tube in place    Cognitive impairment  Assessment & Plan  Per neuropsychology evaluation on 11/14, patient does NOT have capacity to make medical decisions   Patient also has some level of cognitive impairment   B12 borderline low -> s/p one time intramuscular B12 shot -> continue supplementation      Hyperthyroidism  Assessment & Plan  Per chart review patient has had low TSH and elevated free T4 in the past   Most recent labs during this admission with TSH 0 282 and free t4 1 56  Thyroid U/S during last admission at [address] [place] [address] was unremarkable   Evaluated by endocrinology in the past during previous admission at [address] [place] [address] (Oct 22) and they recommend get repeat TFT, TSI and thyroid abs   Endocrinology input noted patient likely subclinical hypothyroidism  Outpatient endocrinology follow-up recommended    Severe protein-calorie malnutrition   Assessment & Plan  Encourage adequate protein and calorie intake    Multiple electrolyte abnormalities  Assessment & Plan  Monitor  Replete    History of CVA (cerebrovascular accident)  Assessment & Plan  H/o CVA x 3 in setting of antiphospholipid syndrome  On Coumadin for anticoagulation  CT head in ER: No acute intracranial abnormality   Encephalomalacia involving the right occipital lobe and bilateral cerebelli   Grossly stable appearance of periventricular hypoattenuation compared to MRI brain 8/29/2022, likely representing chronic microvascular ischemic changes    Awaiting placement to SNF for rehabilitation case management following    Tremor of right hand  Assessment & Plan  Outpatient follow-up w/ neurology     Antiphospholipid antibody with hypercoagulable state   Assessment & Plan  Continue Coumadin regimen - had ischemic stroke in the past despite Eliquis, hence, deemed Eliquis failure  Due to subtherapeutic INR, Coumadin being bridged w/ therapeutic Lovenox currently      Atrial fibrillation  Assessment & Plan  Rate controlled on Toprol-XL  Continue Coumadin for anticoagulation (w/ therapeutic Lovenox bridge until INR at goal)          VTE Pharmacologic Prophylaxis: VTE Score: 3 Moderate Risk (Score 3-4) - Pharmacological DVT Prophylaxis Ordered: enoxaparin (Lovenox)  Patient Centered Rounds: I performed bedside rounds with nursing staff today  Discussions with Specialists or Other Care Team Provider:     Education and Discussions with Family / Patient: Discussed with the patient, called and updated sister Andrés López  Time Spent for Care: 30 minutes  More than 50% of total time spent on counseling and coordination of care as described above  Current Length of Stay: 29 day(s)  Current Patient Status: Inpatient   Certification Statement: The patient will continue to require additional inpatient hospital stay due to as outlined  Discharge Plan: pending placement - case management following     Code Status: Level 3 - DNAR and DNI    Subjective:     Sitting up in chair  Reports feeling okay  Discussed with RN  Patient with rectal tube still with the runs  History chart labs medications reviewed    Objective:     Vitals:   Temp (24hrs), Av 6 °F (36 4 °C), Min:97 5 °F (36 4 °C), Max:97 7 °F (36 5 °C)    Temp:  [97 5 °F (36 4 °C)-97 7 °F (36 5 °C)] 97 7 °F (36 5 °C)  Resp:  [17-20] 19  BP: (143-161)/(92-99) 152/94  Body mass index is 28 19 kg/m²  Input and Output Summary (last 24 hours):      Intake/Output Summary (Last 24 hours) at 2022 1656  Last data filed at 2022 1300  Gross per 24 hour   Intake 1580 ml   Output --   Net 1580 ml       Physical Exam:   Physical Exam     Sitting up in chair  Features of protein calorie malnutrition noted  Neck supple  Lungs diminished breath sounds bilaterally  Heart sounds S1-S2 noted  Abdomen soft nontender  Awake obeys simple commands  No pedal edema  No rash    Additional Data:     Labs:  Results from last 7 days   Lab Units 22  0445 22  0521 22  0455   WBC Thousand/uL 6 70   < > 6 28   HEMOGLOBIN g/dL 10 7*   < > 11 1*   HEMATOCRIT % 33 6*   < > 33 9*   PLATELETS Thousands/uL 175   < > 151   NEUTROS PCT %  --   --  79*   LYMPHS PCT %  --   --  11*   MONOS PCT %  --   --  7   EOS PCT %  --   -- 2    < > = values in this interval not displayed       Results from last 7 days   Lab Units 12/02/22  0445   SODIUM mmol/L 140   POTASSIUM mmol/L 4 1   CHLORIDE mmol/L 111*   CO2 mmol/L 24   BUN mg/dL 9   CREATININE mg/dL 0 53*   ANION GAP mmol/L 5   CALCIUM mg/dL 8 9   ALBUMIN g/dL 2 0*   GLUCOSE RANDOM mg/dL 104     Results from last 7 days   Lab Units 12/02/22  0445   INR  1 72*                   Lines/Drains:  Invasive Devices     Peripheral Intravenous Line  Duration           Peripheral IV 11/27/22 Left;Ventral (anterior) Wrist 5 days          Drain  Duration           Rectal Tube With balloon 8 days                      Imaging: Reviewed radiology reports from this admission including: abdominal/pelvic CT and CT head    Recent Cultures (last 7 days):   Results from last 7 days   Lab Units 11/27/22  1812   C DIFF TOXIN B BY PCR  Negative       Last 24 Hours Medication List:   Current Facility-Administered Medications   Medication Dose Route Frequency Provider Last Rate   • busPIRone  5 mg Oral BID Julio Morocho MD     • cholestyramine sugar free  4 g Oral BID Dora Moseley MD     • vitamin B-12  500 mcg Oral Daily Brandoit Kaylie, DO     • cyanocobalamin  1,000 mcg Intramuscular Q30 Days Brandoit Kaylie, DO     • enoxaparin  1 mg/kg Subcutaneous Q12H Devante Parra MD     • escitalopram  10 mg Oral Daily Tj Salter MD     • fluticasone  1 spray Each Nare Daily Tana Lott, DO     • folic acid  1 mg Oral Daily Julio Morocho MD     • loperamide  2 mg Oral TID Dora Moseley MD     • LORazepam  0 5 mg Oral BID Maricarmen Moseley MD     • metoprolol  5 mg Intravenous Q6H PRN Jamie Wylie MD     • metoprolol succinate  100 mg Oral Daily Maricarmen Moseley MD     • multivitamin stress formula  1 tablet Oral Daily Julio Morocho MD     • pantoprazole  40 mg Oral Early Morning Tana Lott, DO     • potassium chloride  40 mEq Oral BID Maricarmen Moseley MD     • sodium chloride  1 spray Each Nare Q1H PRN Ailyn De La O MD     • sodium chloride 0 9 % with KCl 40 mEq/L  50 mL/hr Intravenous Continuous Gustabo Towilver, DO 50 mL/hr (12/01/22 2120)   • thiamine  100 mg Oral Daily Kelsey Moreno MD     • warfarin  10 mg Oral QPM Gustabo Towilver, DO          Today, Patient Was Seen By: Arlyn Koyanagi, MD    **Please Note: This note may have been constructed using a voice recognition system  **

## 2022-12-02 NOTE — PLAN OF CARE
Problem: PHYSICAL THERAPY ADULT  Goal: Performs mobility at highest level of function for planned discharge setting  See evaluation for individualized goals  Description: Treatment/Interventions: LE strengthening/ROM, Functional transfer training, Endurance training, Patient/family training, Bed mobility, Cognitive reorientation, Therapeutic exercise          See flowsheet documentation for full assessment, interventions and recommendations  Outcome: Progressing  Note: Prognosis: Guarded  Problem List: Decreased strength, Decreased range of motion, Decreased endurance, Impaired balance, Decreased mobility, Decreased cognition, Impaired judgement, Decreased safety awareness, Pain  Assessment: Pt seen for PT treatment session this date  Therapy session focused on functional transfers, gait training, standing tolerance/ balance, therapeutic exercise in order to improve overall mobility and independence  Pt requires assist of 2 for all mobility performed this date  Pt with increased anxiousness noted this date, pt fixated on rectal tube  Pt re-direction with transfers + ambulation  Pt ambulated a total of ~12 ft this date using RW and mod Ax2; pt with very slow lucien + narrow TEE, close chair follow for increased safety  Pt stood with mod A x1 using RW at sink when performing shaving tasks with OT  Pt performed/ toelrated seated therex to b/l LEs, pt engaged in counting out loud  Pt making slow but steady progress toward goals  Pt was left sitting OOB in recliner at the end of PT session with all needs in reach  Pt would benefit from continued PT services while in hospital to address remaining limitations  PT to continue to follow pt and recommends rehab upon d c  The patient's AM-PAC Basic Mobility Inpatient Short Form Raw Score is 8  A Raw score of less than or equal to 16 suggests the patient may benefit from discharge to post-acute rehabilitation services   Please also refer to the recommendation of the Physical Therapist for safe discharge planning  Barriers to Discharge: Inaccessible home environment, Decreased caregiver support     PT Discharge Recommendation: Post acute rehabilitation services    See flowsheet documentation for full assessment

## 2022-12-03 LAB
INR PPP: 2.03 (ref 0.84–1.19)
PROTHROMBIN TIME: 23.2 SECONDS (ref 11.6–14.5)

## 2022-12-03 RX ADMIN — FOLIC ACID 1 MG: 1 TABLET ORAL at 10:05

## 2022-12-03 RX ADMIN — B-COMPLEX W/ C & FOLIC ACID TAB 1 TABLET: TAB at 10:01

## 2022-12-03 RX ADMIN — CYANOCOBALAMIN TAB 500 MCG 500 MCG: 500 TAB at 10:04

## 2022-12-03 RX ADMIN — BUSPIRONE HYDROCHLORIDE 5 MG: 5 TABLET ORAL at 10:02

## 2022-12-03 RX ADMIN — ENOXAPARIN SODIUM 90 MG: 100 INJECTION SUBCUTANEOUS at 10:01

## 2022-12-03 RX ADMIN — ESCITALOPRAM OXALATE 10 MG: 10 TABLET ORAL at 10:02

## 2022-12-03 RX ADMIN — CHOLESTYRAMINE 4 G: 4 POWDER, FOR SUSPENSION ORAL at 10:11

## 2022-12-03 RX ADMIN — LOPERAMIDE HYDROCHLORIDE 2 MG: 2 CAPSULE ORAL at 21:34

## 2022-12-03 RX ADMIN — BUSPIRONE HYDROCHLORIDE 5 MG: 5 TABLET ORAL at 17:29

## 2022-12-03 RX ADMIN — CHOLESTYRAMINE 4 G: 4 POWDER, FOR SUSPENSION ORAL at 17:31

## 2022-12-03 RX ADMIN — LORAZEPAM 0.5 MG: 0.5 TABLET ORAL at 10:05

## 2022-12-03 RX ADMIN — LOPERAMIDE HYDROCHLORIDE 2 MG: 2 CAPSULE ORAL at 10:02

## 2022-12-03 RX ADMIN — POTASSIUM CHLORIDE AND SODIUM CHLORIDE 50 ML/HR: 900; 300 INJECTION, SOLUTION INTRAVENOUS at 12:54

## 2022-12-03 RX ADMIN — POTASSIUM CHLORIDE 40 MEQ: 1500 TABLET, EXTENDED RELEASE ORAL at 10:11

## 2022-12-03 RX ADMIN — METOPROLOL SUCCINATE 100 MG: 100 TABLET, EXTENDED RELEASE ORAL at 10:04

## 2022-12-03 RX ADMIN — LORAZEPAM 0.5 MG: 0.5 TABLET ORAL at 17:29

## 2022-12-03 RX ADMIN — FLUTICASONE PROPIONATE 1 SPRAY: 50 SPRAY, METERED NASAL at 10:06

## 2022-12-03 RX ADMIN — LOPERAMIDE HYDROCHLORIDE 2 MG: 2 CAPSULE ORAL at 16:43

## 2022-12-03 RX ADMIN — PANTOPRAZOLE SODIUM 40 MG: 40 TABLET, DELAYED RELEASE ORAL at 05:48

## 2022-12-03 RX ADMIN — ENOXAPARIN SODIUM 90 MG: 100 INJECTION SUBCUTANEOUS at 21:34

## 2022-12-03 RX ADMIN — POTASSIUM CHLORIDE 40 MEQ: 1500 TABLET, EXTENDED RELEASE ORAL at 17:29

## 2022-12-03 RX ADMIN — THIAMINE HCL TAB 100 MG 100 MG: 100 TAB at 10:05

## 2022-12-03 RX ADMIN — WARFARIN SODIUM 10 MG: 5 TABLET ORAL at 17:29

## 2022-12-03 NOTE — PROGRESS NOTES
[facility]  Progress Note - Gage Cervantes 1960, 58 y o  male MRN: [id number]  Unit/Bed#: [facility] 907-01 Encounter: [id number]  Primary Care Provider: Dina Tripathi MD   Date and time admitted to hospital: 11/3/2022  3:28 PM    * SHIMON (generalized anxiety disorder)  Assessment & Plan  Previous admission from the end of September until the end of October at [facility] in which he was hospitalized for over a month for severe anxiety and inability to care for self  Per sister reportedly was able to ambulate prior to the hospitalization but after the hospitalization patient needing assistance with ambulation at home  During hospitalization there patient developed hyponatremia during his course of stay and Zoloft that had been started was stopped due to hyponatremia  Recently discharged from inpatient psychiatry with mirtazapine and aripiprazole   psychiatry started buspar 5mg BID, and lexapro  Reconsulted psychiatry for re-evaluation / possible IP psych admission for [address] [place] [address] - [facility] on 11/18 recommended inpatient psychiatry admission, in-person evaluation with our on-site psychiatrist 11/21 did not feel this was necessary  Recommended increasing escitalopram, making lorazepam scheduled as patient does not ask for it but his anxiety might benefit from it, and continuing BusPar  Overall plan is to help patient improve his anxiety so that he can get to rehab    Per updated discussion w/ psychiatry -> continue current Lexapro/Buspar/Ativan regimen -> okay for discharge to skilled rehab from a  awaiting placement - case management following    Diarrhea  Assessment & Plan  Ongoing watery diarrhea  Stool C diff PCR negative  Stool studies negative  GI input noted  Rectal tube in place    Cognitive impairment  Assessment & Plan  Per neuropsychology evaluation on 11/14, patient does NOT have capacity to make medical decisions   Patient also has some level of cognitive impairment   B12 borderline low -> s/p one time intramuscular B12 shot -> continue supplementation      Hyperthyroidism  Assessment & Plan  Per chart review patient has had low TSH and elevated free T4 in the past   Most recent labs during this admission with TSH 0 282 and free t4 1 56  Thyroid U/S during last admission at [address] [place] [address] was unremarkable   Evaluated by endocrinology in the past during previous admission at [address] [place] [address] (Oct 22) and they recommend get repeat TFT, TSI and thyroid abs   Endocrinology input noted patient likely subclinical hypothyroidism  Outpatient endocrinology follow-up recommended    Severe protein-calorie malnutrition   Assessment & Plan  Encourage adequate protein and calorie intake    Multiple electrolyte abnormalities  Assessment & Plan  Monitor  Replete    History of CVA (cerebrovascular accident)  Assessment & Plan  H/o CVA x 3 in setting of antiphospholipid syndrome  On Coumadin for anticoagulation  CT head in ER: No acute intracranial abnormality   Encephalomalacia involving the right occipital lobe and bilateral cerebelli   Grossly stable appearance of periventricular hypoattenuation compared to MRI brain 8/29/2022, likely representing chronic microvascular ischemic changes    Awaiting placement to SNF for rehabilitation case management following    Tremor of right hand  Assessment & Plan  Outpatient follow-up w/ neurology     Antiphospholipid antibody with hypercoagulable state   Assessment & Plan  Continue Coumadin regimen - had ischemic stroke in the past despite Eliquis, hence, deemed Eliquis failure  Coumadin being bridged w/ therapeutic Lovenox currently  Monitor INR    Atrial fibrillation  Assessment & Plan  Rate controlled on Toprol-XL  Continue Coumadin for anticoagulation (w/ therapeutic Lovenox bridge until INR at goal)  Monitor INR              VTE Pharmacologic Prophylaxis: VTE Score: 3 Moderate Risk (Score 3-4) - Pharmacological DVT Prophylaxis Ordered: enoxaparin (Lovenox)  Patient Centered Rounds: I performed bedside rounds with nursing staff today  Discussions with Specialists or Other Care Team Provider:     Education and Discussions with Family / Patient: Patient, called updated sister Horacio Schultz  Time Spent for Care: 30 minutes  More than 50% of total time spent on counseling and coordination of care as described above  Current Length of Stay: 30 day(s)  Current Patient Status: Inpatient   Certification Statement: The patient will continue to require additional inpatient hospital stay due to as outlined  Discharge Plan: pending placement - case management following     Code Status: Level 3 - DNAR and DNI    Subjective:     Sitting up in chair  Reports feeling okay  Discussed with RN patient still with stool output to requiring rectal tube however stools likely for more than yesterday      Objective:     Vitals:   Temp (24hrs), Av 5 °F (36 4 °C), Min:97 5 °F (36 4 °C), Max:97 5 °F (36 4 °C)    Temp:  [97 5 °F (36 4 °C)] 97 5 °F (36 4 °C)  Resp:  [17-18] 17  BP: (149)/(92-93) 149/93  Body mass index is 28 19 kg/m²  Input and Output Summary (last 24 hours):      Intake/Output Summary (Last 24 hours) at 12/3/2022 1628  Last data filed at 12/3/2022 1253  Gross per 24 hour   Intake 970 ml   Output --   Net 970 ml       Physical Exam:   Physical Exam     Sitting up in chair  Mucous members are moist  Neck supple  Lungs diminished breath sounds bilaterally  Heart sounds S1 and S2 noted  Abdomen soft nontender  Awake alert obeys simple commands  No pedal edema  No rash    Additional Data:     Labs:  Results from last 7 days   Lab Units 22  0445 22  0521 22  0455   WBC Thousand/uL 6 70   < > 6 28   HEMOGLOBIN g/dL 10 7*   < > 11 1*   HEMATOCRIT % 33 6*   < > 33 9*   PLATELETS Thousands/uL 175   < > 151   NEUTROS PCT %  --   --  79*   LYMPHS PCT %  --   --  11*   MONOS PCT %  --   --  7   EOS PCT %  --   --  2    < > = values in this interval not displayed  Results from last 7 days   Lab Units 12/02/22  0445   SODIUM mmol/L 140   POTASSIUM mmol/L 4 1   CHLORIDE mmol/L 111*   CO2 mmol/L 24   BUN mg/dL 9   CREATININE mg/dL 0 53*   ANION GAP mmol/L 5   CALCIUM mg/dL 8 9   ALBUMIN g/dL 2 0*   GLUCOSE RANDOM mg/dL 104     Results from last 7 days   Lab Units 12/03/22  0719   INR  2 03*                   Lines/Drains:  Invasive Devices     Peripheral Intravenous Line  Duration           Peripheral IV 11/27/22 Left;Ventral (anterior) Wrist 6 days          Drain  Duration           Rectal Tube With balloon 9 days                      Imaging: No pertinent imaging reviewed      Recent Cultures (last 7 days):   Results from last 7 days   Lab Units 11/27/22  1812   C DIFF TOXIN B BY PCR  Negative       Last 24 Hours Medication List:   Current Facility-Administered Medications   Medication Dose Route Frequency Provider Last Rate   • busPIRone  5 mg Oral BID Allan Espinoza MD     • cholestyramine sugar free  4 g Oral BID Mari Tena MD     • vitamin B-12  500 mcg Oral Daily Tana Lott, DO     • cyanocobalamin  1,000 mcg Intramuscular Q30 Days Tana Lott, DO     • enoxaparin  1 mg/kg Subcutaneous Q12H [facility] Allan Espinoza MD     • escitalopram  10 mg Oral Daily Cameron Salter MD     • fluticasone  1 spray Each Nare Daily Tana Lott DO     • folic acid  1 mg Oral Daily Allan Espinoza MD     • loperamide  2 mg Oral TID Mari Tena MD     • LORazepam  0 5 mg Oral BID Nichole Reyes MD     • metoprolol  5 mg Intravenous Q6H PRN Christine Mancia MD     • metoprolol succinate  100 mg Oral Daily Nichole Reyes MD     • multivitamin stress formula  1 tablet Oral Daily Allan Espinoza MD     • pantoprazole  40 mg Oral Early Morning Tana Lott, DO     • potassium chloride  40 mEq Oral BID Nichole Reyes MD     • sodium chloride  1 spray Each Nare Q1H PRN Nichole Reyes MD     • sodium chloride 0 9 % with KCl 40 mEq/L  50 mL/hr Intravenous Continuous Pandi Toedye, DO 50 mL/hr (12/03/22 1254)   • thiamine  100 mg Oral Daily Tito Matta MD     • warfarin  10 mg Oral QPM Alessioi Toedye, DO          Today, Patient Was Seen By: Ian Zelaya MD    **Please Note: This note may have been constructed using a voice recognition system  **

## 2022-12-03 NOTE — ASSESSMENT & PLAN NOTE
Continue Coumadin regimen - had ischemic stroke in the past despite Eliquis, hence, deemed Eliquis failure  Coumadin being bridged w/ therapeutic Lovenox currently  Monitor INR

## 2022-12-03 NOTE — ASSESSMENT & PLAN NOTE
Previous admission from the end of September until the end of October at [facility] in which he was hospitalized for over a month for severe anxiety and inability to care for self  Per sister reportedly was able to ambulate prior to the hospitalization but after the hospitalization patient needing assistance with ambulation at home  During hospitalization there patient developed hyponatremia during his course of stay and Zoloft that had been started was stopped due to hyponatremia  Recently discharged from inpatient psychiatry with mirtazapine and aripiprazole   psychiatry started buspar 5mg BID, and lexapro  Reconsulted psychiatry for re-evaluation / possible IP psych admission for [facility] psychiatry on 11/18 recommended inpatient psychiatry admission, in-person evaluation with our on-site psychiatrist 11/21 did not feel this was necessary  Recommended increasing escitalopram, making lorazepam scheduled as patient does not ask for it but his anxiety might benefit from it, and continuing BusPar  Overall plan is to help patient improve his anxiety so that he can get to rehab    Per updated discussion w/ psychiatry -> continue current Lexapro/Buspar/Ativan regimen -> okay for discharge to skilled rehab from a  awaiting placement - case management following

## 2022-12-03 NOTE — ASSESSMENT & PLAN NOTE
Per chart review patient has had low TSH and elevated free T4 in the past   Most recent labs during this admission with TSH 0 282 and free t4 1 56  Thyroid U/S during last admission at [facility] was unremarkable   Evaluated by endocrinology in the past during previous admission at [facility] (Oct 22) and they recommend get repeat TFT, TSI and thyroid abs   Endocrinology input noted patient likely subclinical hypothyroidism  Outpatient endocrinology follow-up recommended

## 2022-12-03 NOTE — ASSESSMENT & PLAN NOTE
Rate controlled on Toprol-XL  Continue Coumadin for anticoagulation (w/ therapeutic Lovenox bridge until INR at goal)  Monitor INR

## 2022-12-04 LAB
INR PPP: 2.28 (ref 0.84–1.19)
PROTHROMBIN TIME: 25.4 SECONDS (ref 11.6–14.5)

## 2022-12-04 RX ADMIN — ESCITALOPRAM OXALATE 10 MG: 10 TABLET ORAL at 08:41

## 2022-12-04 RX ADMIN — THIAMINE HCL TAB 100 MG 100 MG: 100 TAB at 08:43

## 2022-12-04 RX ADMIN — CHOLESTYRAMINE 4 G: 4 POWDER, FOR SUSPENSION ORAL at 17:11

## 2022-12-04 RX ADMIN — POTASSIUM CHLORIDE 40 MEQ: 1500 TABLET, EXTENDED RELEASE ORAL at 17:11

## 2022-12-04 RX ADMIN — LOPERAMIDE HYDROCHLORIDE 2 MG: 2 CAPSULE ORAL at 17:10

## 2022-12-04 RX ADMIN — METOPROLOL SUCCINATE 100 MG: 100 TABLET, EXTENDED RELEASE ORAL at 08:42

## 2022-12-04 RX ADMIN — CHOLESTYRAMINE 4 G: 4 POWDER, FOR SUSPENSION ORAL at 08:47

## 2022-12-04 RX ADMIN — BUSPIRONE HYDROCHLORIDE 5 MG: 5 TABLET ORAL at 08:42

## 2022-12-04 RX ADMIN — ENOXAPARIN SODIUM 90 MG: 100 INJECTION SUBCUTANEOUS at 08:42

## 2022-12-04 RX ADMIN — LORAZEPAM 0.5 MG: 0.5 TABLET ORAL at 08:41

## 2022-12-04 RX ADMIN — FOLIC ACID 1 MG: 1 TABLET ORAL at 08:41

## 2022-12-04 RX ADMIN — CYANOCOBALAMIN TAB 500 MCG 500 MCG: 500 TAB at 08:41

## 2022-12-04 RX ADMIN — PANTOPRAZOLE SODIUM 40 MG: 40 TABLET, DELAYED RELEASE ORAL at 05:41

## 2022-12-04 RX ADMIN — FLUTICASONE PROPIONATE 1 SPRAY: 50 SPRAY, METERED NASAL at 08:46

## 2022-12-04 RX ADMIN — LOPERAMIDE HYDROCHLORIDE 2 MG: 2 CAPSULE ORAL at 08:42

## 2022-12-04 RX ADMIN — LORAZEPAM 0.5 MG: 0.5 TABLET ORAL at 17:10

## 2022-12-04 RX ADMIN — BUSPIRONE HYDROCHLORIDE 5 MG: 5 TABLET ORAL at 17:11

## 2022-12-04 RX ADMIN — B-COMPLEX W/ C & FOLIC ACID TAB 1 TABLET: TAB at 08:42

## 2022-12-04 RX ADMIN — WARFARIN SODIUM 10 MG: 5 TABLET ORAL at 17:11

## 2022-12-04 RX ADMIN — POTASSIUM CHLORIDE 40 MEQ: 1500 TABLET, EXTENDED RELEASE ORAL at 08:42

## 2022-12-04 NOTE — ASSESSMENT & PLAN NOTE
Continue Coumadin regimen - had ischemic stroke in the past despite Eliquis, hence, deemed Eliquis failure  Coumadin for anticoagulation  Goal INR 2-3  Monitor INR

## 2022-12-04 NOTE — PROGRESS NOTES
[address] [facility]  Progress Note - Johnny Tubbs 1960, 58 y o  male MRN: [id number]  [facility] Encounter: [id number]  Primary Care Provider: Alaina Villa MD   Date and time admitted to hospital: 11/3/2022  3:28 PM    * SHIMON (generalized anxiety disorder)  Assessment & Plan  Previous admission from the end of September until the end of October at [facility] in which he was hospitalized for over a month for severe anxiety and inability to care for self  Per sister reportedly was able to ambulate prior to the hospitalization but after the hospitalization patient needing assistance with ambulation at home  During hospitalization there patient developed hyponatremia during his course of stay and Zoloft that had been started was stopped due to hyponatremia  Recently discharged from inpatient psychiatry with mirtazapine and aripiprazole   psychiatry started buspar 5mg BID, and lexapro  Reconsulted psychiatry for re-evaluation / possible IP psych admission for [facility] on 11/18 recommended inpatient psychiatry admission, in-person evaluation with our on-site psychiatrist 11/21 did not feel this was necessary  Recommended increasing escitalopram, making lorazepam scheduled as patient does not ask for it but his anxiety might benefit from it, and continuing BusPar  Overall plan is to help patient improve his anxiety so that he can get to rehab    Per updated discussion w/ psychiatry -> continue current Lexapro/Buspar/Ativan regimen -> okay for discharge to skilled rehab from a  awaiting placement - case management following    Diarrhea  Assessment & Plan  Ongoing watery diarrhea  Stool C diff PCR negative  Stool studies negative  GI input noted  Rectal tube in place    Cognitive impairment  Assessment & Plan  Per neuropsychology evaluation on 11/14, patient does NOT have capacity to make medical decisions   Patient also has some level of cognitive impairment   B12 borderline low -> s/p one time intramuscular B12 shot -> continue supplementation      Hyperthyroidism  Assessment & Plan  Per chart review patient has had low TSH and elevated free T4 in the past   Most recent labs during this admission with TSH 0 282 and free t4 1 56  Thyroid U/S during last admission at [address] [place] [address] was unremarkable   Evaluated by endocrinology in the past during previous admission at [address] [place] [address] (Oct 22) and they recommend get repeat TFT, TSI and thyroid abs   Endocrinology input noted patient likely subclinical hypothyroidism  Outpatient endocrinology follow-up recommended    Severe protein-calorie malnutrition   Assessment & Plan  Encourage adequate protein and calorie intake    Multiple electrolyte abnormalities  Assessment & Plan  Monitor  Replete    History of CVA (cerebrovascular accident)  Assessment & Plan  H/o CVA x 3 in setting of antiphospholipid syndrome  On Coumadin for anticoagulation  CT head in ER: No acute intracranial abnormality   Encephalomalacia involving the right occipital lobe and bilateral cerebelli   Grossly stable appearance of periventricular hypoattenuation compared to MRI brain 8/29/2022, likely representing chronic microvascular ischemic changes  Awaiting placement to SNF for rehabilitation case management following    Tremor of right hand  Assessment & Plan  Outpatient follow-up w/ neurology     Antiphospholipid antibody with hypercoagulable state   Assessment & Plan  Continue Coumadin regimen - had ischemic stroke in the past despite Eliquis, hence, deemed Eliquis failure  Coumadin for anticoagulation  Goal INR 2-3  Monitor INR    Atrial fibrillation  Assessment & Plan  Rate controlled on Toprol-XL  Coumadin for anticoagulation  Monitor INR            VTE Pharmacologic Prophylaxis: VTE Score: 3 Moderate Risk (Score 3-4) - Pharmacological DVT Prophylaxis Ordered: warfarin (Coumadin)      Patient Centered Rounds: I performed bedside rounds with nursing staff today   Discussions with Specialists or Other Care Team Provider:     Education and Discussions with Family / Patient: Patient, updated sister Addison Johnson  Time Spent for Care: 30 minutes  More than 50% of total time spent on counseling and coordination of care as described above  Current Length of Stay: 31 day(s)  Current Patient Status: Inpatient   Certification Statement: The patient will continue to require additional inpatient hospital stay due to as outlined  Discharge Plan: pending placement case management following    Code Status: Level 3 - DNAR and DNI    Subjective:     Comfortably sitting up in bed  Reports feeling okay  Discussed with RN stools firming up  Encouraged incentive spirometry    Objective:     Vitals:   Temp (24hrs), Av 4 °F (36 3 °C), Min:97 2 °F (36 2 °C), Max:97 5 °F (36 4 °C)    Temp:  [97 2 °F (36 2 °C)-97 5 °F (36 4 °C)] 97 3 °F (36 3 °C)  Resp:  [17-20] 17  BP: (136-163)/(85-94) 136/85  Body mass index is 28 19 kg/m²  Input and Output Summary (last 24 hours): Intake/Output Summary (Last 24 hours) at 2022 1608  Last data filed at 12/3/2022 1700  Gross per 24 hour   Intake 280 ml   Output --   Net 280 ml       Physical Exam:   Physical Exam     Sitting up in bed  Neck supple  Lungs diminished breath sounds bilaterally  Heart sounds S1 and S2 noted  Heart sounds distant  Abdomen soft nontender  Awake alert obeys simple commands  No pedal edema  No rash    Additional Data:     Labs:  Results from last 7 days   Lab Units 22  0445 22  0521 22  0455   WBC Thousand/uL 6 70   < > 6 28   HEMOGLOBIN g/dL 10 7*   < > 11 1*   HEMATOCRIT % 33 6*   < > 33 9*   PLATELETS Thousands/uL 175   < > 151   NEUTROS PCT %  --   --  79*   LYMPHS PCT %  --   --  11*   MONOS PCT %  --   --  7   EOS PCT %  --   --  2    < > = values in this interval not displayed       Results from last 7 days   Lab Units 22  0445   SODIUM mmol/L 140   POTASSIUM mmol/L 4 1   CHLORIDE mmol/L 111*   CO2 mmol/L 24   BUN mg/dL 9   CREATININE mg/dL 0 53*   ANION GAP mmol/L 5   CALCIUM mg/dL 8 9   ALBUMIN g/dL 2 0*   GLUCOSE RANDOM mg/dL 104     Results from last 7 days   Lab Units 12/04/22  0445   INR  2 28*                   Lines/Drains:  Invasive Devices     Peripheral Intravenous Line  Duration           Peripheral IV 12/04/22 Right;Upper;Ventral (anterior) Arm <1 day          Drain  Duration           Rectal Tube With balloon 10 days                  Imaging: Reviewed radiology reports from this admission including: CT head    Recent Cultures (last 7 days):   Results from last 7 days   Lab Units 11/27/22  1812   C DIFF TOXIN B BY PCR  Negative       Last 24 Hours Medication List:   Current Facility-Administered Medications   Medication Dose Route Frequency Provider Last Rate   • busPIRone  5 mg Oral BID Alexandria Bentley MD     • cholestyramine sugar free  4 g Oral BID Jessica Nixon MD     • vitamin B-12  500 mcg Oral Daily Brandoit Kaylie, DO     • cyanocobalamin  1,000 mcg Intramuscular Q30 Days Tana Lott DO     • escitalopram  10 mg Oral Daily Ann Marie Salter MD     • fluticasone  1 spray Each Nare Daily Tana Lott DO     • folic acid  1 mg Oral Daily Alexandria Bentley MD     • loperamide  2 mg Oral TID Jessica Nixon MD     • LORazepam  0 5 mg Oral BID Leena Cortez MD     • metoprolol  5 mg Intravenous Q6H PRN Kimmy Hernandez MD     • metoprolol succinate  100 mg Oral Daily Leena Cortez MD     • multivitamin stress formula  1 tablet Oral Daily Alexandria Bentley MD     • pantoprazole  40 mg Oral Early Morning Tana Lott DO     • potassium chloride  40 mEq Oral BID Leena Cortez MD     • sodium chloride  1 spray Each Nare Q1H PRN Leena Cortez MD     • thiamine  100 mg Oral Daily Alexandria Bentley MD     • warfarin  10 mg Oral QPM Gustabo Mcgee DO          Today, Patient Was Seen By: Jessica Nixon MD    **Please Note: This note may have been constructed using a voice recognition system  **

## 2022-12-04 NOTE — ASSESSMENT & PLAN NOTE
Previous admission from the end of September until the end of October at [facility] in which he was hospitalized for over a month for severe anxiety and inability to care for self  Per sister reportedly was able to ambulate prior to the hospitalization but after the hospitalization patient needing assistance with ambulation at home  During hospitalization there patient developed hyponatremia during his course of stay and Zoloft that had been started was stopped due to hyponatremia  Recently discharged from inpatient psychiatry with mirtazapine and aripiprazole   psychiatry started buspar 5mg BID, and lexapro  Reconsulted psychiatry for re-evaluation / possible IP psych admission for [facility] psychiatry on 11/18 recommended inpatient psychiatry admission, in-person evaluation with our on-site psychiatrist 11/21 did not feel this was necessary  Recommended increasing escitalopram, making lorazepam scheduled as patient does not ask for it but his anxiety might benefit from it, and continuing BusPar  Overall plan is to help patient improve his anxiety so that he can get to rehab    Per updated discussion w/ psychiatry -> continue current Lexapro/Buspar/Ativan regimen -> okay for discharge to skilled rehab from a  awaiting placement - case management following

## 2022-12-05 LAB
INR PPP: 2.32 (ref 0.84–1.19)
PROTHROMBIN TIME: 25.7 SECONDS (ref 11.6–14.5)

## 2022-12-05 RX ORDER — LOPERAMIDE HYDROCHLORIDE 2 MG/1
2 CAPSULE ORAL 4 TIMES DAILY
Status: DISCONTINUED | OUTPATIENT
Start: 2022-12-05 | End: 2022-12-07

## 2022-12-05 RX ADMIN — CHOLESTYRAMINE 4 G: 4 POWDER, FOR SUSPENSION ORAL at 08:09

## 2022-12-05 RX ADMIN — METOPROLOL SUCCINATE 100 MG: 100 TABLET, EXTENDED RELEASE ORAL at 08:06

## 2022-12-05 RX ADMIN — CYANOCOBALAMIN TAB 500 MCG 500 MCG: 500 TAB at 08:06

## 2022-12-05 RX ADMIN — WARFARIN SODIUM 10 MG: 5 TABLET ORAL at 16:45

## 2022-12-05 RX ADMIN — BUSPIRONE HYDROCHLORIDE 5 MG: 5 TABLET ORAL at 16:45

## 2022-12-05 RX ADMIN — FOLIC ACID 1 MG: 1 TABLET ORAL at 08:06

## 2022-12-05 RX ADMIN — LOPERAMIDE HYDROCHLORIDE 2 MG: 2 CAPSULE ORAL at 11:18

## 2022-12-05 RX ADMIN — BUSPIRONE HYDROCHLORIDE 5 MG: 5 TABLET ORAL at 08:06

## 2022-12-05 RX ADMIN — FLUTICASONE PROPIONATE 1 SPRAY: 50 SPRAY, METERED NASAL at 08:09

## 2022-12-05 RX ADMIN — POTASSIUM CHLORIDE 40 MEQ: 1500 TABLET, EXTENDED RELEASE ORAL at 08:06

## 2022-12-05 RX ADMIN — POTASSIUM CHLORIDE 40 MEQ: 1500 TABLET, EXTENDED RELEASE ORAL at 16:45

## 2022-12-05 RX ADMIN — PANTOPRAZOLE SODIUM 40 MG: 40 TABLET, DELAYED RELEASE ORAL at 05:57

## 2022-12-05 RX ADMIN — LOPERAMIDE HYDROCHLORIDE 2 MG: 2 CAPSULE ORAL at 21:29

## 2022-12-05 RX ADMIN — ESCITALOPRAM OXALATE 10 MG: 10 TABLET ORAL at 08:06

## 2022-12-05 RX ADMIN — LOPERAMIDE HYDROCHLORIDE 2 MG: 2 CAPSULE ORAL at 16:45

## 2022-12-05 RX ADMIN — LORAZEPAM 0.5 MG: 0.5 TABLET ORAL at 16:45

## 2022-12-05 RX ADMIN — LORAZEPAM 0.5 MG: 0.5 TABLET ORAL at 08:06

## 2022-12-05 RX ADMIN — THIAMINE HCL TAB 100 MG 100 MG: 100 TAB at 08:06

## 2022-12-05 RX ADMIN — B-COMPLEX W/ C & FOLIC ACID TAB 1 TABLET: TAB at 08:06

## 2022-12-05 NOTE — ASSESSMENT & PLAN NOTE
-Continue Coumadin, Goal INR 2-3  -had ischemic stroke in the past despite Eliquis, hence, deemed Eliquis failure

## 2022-12-05 NOTE — PLAN OF CARE
Problem: Potential for Falls  Goal: Patient will remain free of falls  Description: INTERVENTIONS:  - Educate patient/family on patient safety including physical limitations  - Instruct patient to call for assistance with activity   - Consult OT/PT to assist with strengthening/mobility   - Keep Call bell within reach  - Keep bed low and locked with side rails adjusted as appropriate  - Keep care items and personal belongings within reach  - Initiate and maintain comfort rounds  - Make Fall Risk Sign visible to staff  - Offer Toileting every 3 Hours, in advance of need  - Initiate/Maintain 3alarm  - Obtain necessary fall risk management equipment: 3  - Apply yellow socks and bracelet for high fall risk patients  - Consider moving patient to room near nurses station  Outcome: Progressing     Problem: PAIN - ADULT  Goal: Verbalizes/displays adequate comfort level or baseline comfort level  Description: Interventions:  - Encourage patient to monitor pain and request assistance  - Assess pain using appropriate pain scale  - Administer analgesics based on type and severity of pain and evaluate response  - Implement non-pharmacological measures as appropriate and evaluate response  - Consider cultural and social influences on pain and pain management  - Notify physician/advanced practitioner if interventions unsuccessful or patient reports new pain  Outcome: Progressing     Problem: SAFETY ADULT  Goal: Patient will remain free of falls  Description: INTERVENTIONS:  - Educate patient/family on patient safety including physical limitations  - Instruct patient to call for assistance with activity   - Consult OT/PT to assist with strengthening/mobility   - Keep Call bell within reach  - Keep bed low and locked with side rails adjusted as appropriate  - Keep care items and personal belongings within reach  - Initiate and maintain comfort rounds  - Make Fall Risk Sign visible to staff  - Offer Toileting every 3 Hours, in advance of need  - Initiate/Maintain 3alarm  - Obtain necessary fall risk management equipment: 3  - Apply yellow socks and bracelet for high fall risk patients  - Consider moving patient to room near nurses station  Outcome: Progressing  Goal: Maintain or return to baseline ADL function  Description: INTERVENTIONS:  -  Assess patient's ability to carry out ADLs; assess patient's baseline for ADL function and identify physical deficits which impact ability to perform ADLs (bathing, care of mouth/teeth, toileting, grooming, dressing, etc )  - Assess/evaluate cause of self-care deficits   - Assess range of motion  - Assess patient's mobility; develop plan if impaired  - Assess patient's need for assistive devices and provide as appropriate  - Encourage maximum independence but intervene and supervise when necessary  - Involve family in performance of ADLs  - Assess for home care needs following discharge   - Consider OT consult to assist with ADL evaluation and planning for discharge  - Provide patient education as appropriate  Outcome: Progressing  Goal: Maintains/Returns to pre admission functional level  Description: INTERVENTIONS:  - Perform BMAT or MOVE assessment daily    - Set and communicate daily mobility goal to care team and patient/family/caregiver  - Collaborate with rehabilitation services on mobility goals if consulted  - Perform Range of Motion 3 times a day  - Reposition patient every 3 hours    - Dangle patient 3 times a day  - Stand patient 3 times a day  - Ambulate patient 3 times a day  - Out of bed to chair 3 times a day   - Out of bed for meals 3 times a day  - Out of bed for toileting  - Record patient progress and toleration of activity level   Outcome: Progressing     Problem: MOBILITY - ADULT  Goal: Maintain or return to baseline ADL function  Description: INTERVENTIONS:  -  Assess patient's ability to carry out ADLs; assess patient's baseline for ADL function and identify physical deficits which impact ability to perform ADLs (bathing, care of mouth/teeth, toileting, grooming, dressing, etc )  - Assess/evaluate cause of self-care deficits   - Assess range of motion  - Assess patient's mobility; develop plan if impaired  - Assess patient's need for assistive devices and provide as appropriate  - Encourage maximum independence but intervene and supervise when necessary  - Involve family in performance of ADLs  - Assess for home care needs following discharge   - Consider OT consult to assist with ADL evaluation and planning for discharge  - Provide patient education as appropriate  Outcome: Progressing  Goal: Maintains/Returns to pre admission functional level  Description: INTERVENTIONS:  - Perform BMAT or MOVE assessment daily    - Set and communicate daily mobility goal to care team and patient/family/caregiver  - Collaborate with rehabilitation services on mobility goals if consulted  - Perform Range of Motion 3 times a day  - Reposition patient every 3 hours    - Dangle patient 3 times a day  - Stand patient 3 times a day  - Ambulate patient 3 times a day  - Out of bed to chair 3 times a day   - Out of bed for meals 3 times a day  - Out of bed for toileting  - Record patient progress and toleration of activity level   Outcome: Progressing     Problem: Prexisting or High Potential for Compromised Skin Integrity  Goal: Skin integrity is maintained or improved  Description: INTERVENTIONS:  - Identify patients at risk for skin breakdown  - Assess and monitor skin integrity  - Assess and monitor nutrition and hydration status  - Monitor labs   - Assess for incontinence   - Turn and reposition patient  - Assist with mobility/ambulation  - Relieve pressure over bony prominences  - Avoid friction and shearing  - Provide appropriate hygiene as needed including keeping skin clean and dry  - Evaluate need for skin moisturizer/barrier cream  - Collaborate with interdisciplinary team   - Patient/family teaching  - Consider wound care consult   Outcome: Progressing     Problem: Nutrition/Hydration-ADULT  Goal: Nutrient/Hydration intake appropriate for improving, restoring or maintaining nutritional needs  Description: Monitor and assess patient's nutrition/hydration status for malnutrition  Collaborate with interdisciplinary team and initiate plan and interventions as ordered  Monitor patient's weight and dietary intake as ordered or per policy  Utilize nutrition screening tool and intervene as necessary  Determine patient's food preferences and provide high-protein, high-caloric foods as appropriate       INTERVENTIONS:  - Monitor oral intake, urinary output, labs, and treatment plans  - Assess nutrition and hydration status and recommend course of action  - Evaluate amount of meals eaten  - Assist patient with eating if necessary   - Allow adequate time for meals  - Recommend/ encourage appropriate diets, oral nutritional supplements, and vitamin/mineral supplements  - Order, calculate, and assess calorie counts as needed  - Recommend, monitor, and adjust tube feedings and TPN/PPN based on assessed needs  - Assess need for intravenous fluids  - Provide specific nutrition/hydration education as appropriate  - Include patient/family/caregiver in decisions related to nutrition  Outcome: Progressing

## 2022-12-05 NOTE — ASSESSMENT & PLAN NOTE
-Ongoing watery diarrhea  -Rectal tube remains in place  -Stool C diff PCR negative  -Stool studies negative  -cont cholestyramine  -GI saw in consult, will ask them to see again today

## 2022-12-05 NOTE — ASSESSMENT & PLAN NOTE
-Per neuropsychology evaluation on 11/14/22, patient does NOT have capacity to make medical decisions   -Patient also has some level of cognitive impairment   -B12 borderline low, continue supplementation

## 2022-12-05 NOTE — ASSESSMENT & PLAN NOTE
-Previous admission from the end of September until the end of October at [facility] in which he was hospitalized for over a month for severe anxiety and inability to care for self  -Per sister reportedly was able to ambulate prior to the hospitalization but after the hospitalization patient needing assistance with ambulation at home  -During hospitalization there patient developed hyponatremia during his course of stay and Zoloft that had been started was stopped due to hyponatremia  -Recently discharged from inpatient psychiatry with mirtazapine and aripiprazole  -Pt seen in consultation by Psychiatry, started on Buspar/Lexapro  -Reconsulted psychiatry for re-evaluation/possible IP psych admission for [facility] - virtual psychiatry on 11/18/22 recommended inpatient psychiatry admission, in-person evaluation with our on-site psychiatrist 11/21/22 did not feel this was necessary  Recommended increasing escitalopram, making lorazepam scheduled as patient does not ask for it but his anxiety might benefit from it, and continuing BusPar   Overall plan is to help patient improve his anxiety so that he can get to rehab   -Per updated discussion w/ psychiatry -> continue current Lexapro/Buspar/Ativan regimen -> okay for discharge to skilled rehab from a  awaiting placement - case management following

## 2022-12-05 NOTE — ASSESSMENT & PLAN NOTE
-Per chart review patient has had low TSH and elevated free T4 in the past   -Most recent labs during this admission with TSH 0 282 and free t4 1 56  -Thyroid U/S during last admission at [address] [place] [address] was unremarkable   -Evaluated by endocrinology in the past during previous admission at [address] [place] [address] (Oct 22) and they recommend get repeat TFT, TSI and thyroid abs   -Endocrinology input noted patient likely subclinical hypothyroidism  -Outpatient endocrinology follow-up recommended

## 2022-12-05 NOTE — PLAN OF CARE
Problem: PHYSICAL THERAPY ADULT  Goal: Performs mobility at highest level of function for planned discharge setting  See evaluation for individualized goals  Description: Treatment/Interventions: LE strengthening/ROM, Functional transfer training, Endurance training, Patient/family training, Bed mobility, Cognitive reorientation, Therapeutic exercise          See flowsheet documentation for full assessment, interventions and recommendations  Outcome: Progressing  Note: Prognosis: Fair  Problem List: Decreased strength, Decreased range of motion, Decreased endurance, Impaired balance, Decreased mobility, Decreased cognition, Impaired judgement, Decreased safety awareness, Pain  Assessment: Pt seen for PT treatment session this date  Therapy session focused on bed mobility, functional transfers, gait training and endurance training in order to improve overall mobility and independence  Pt requires assist of 1 + chair follow/ SBA of another for safety  Pt with improvements in all mobility performed + cognitive status this date  Pt completes bed mobility tasks with min A, functional transfers with mod Ax1 and ambulation with mod A progressing to min A with distance  Pt continues to require VC for widening TEE/ increasing step length  Seated rest breaks required between gait trials  Pt making steady progress toward goals  Pt was left sitting OOB in recliner at the end of PT session with all needs in reach  Pt would benefit from continued PT services while in hospital to address remaining limitations  PT to continue to follow pt and recommends rehab  The patient's AM-PAC Basic Mobility Inpatient Short Form Raw Score is 14  A Raw score of less than or equal to 16 suggests the patient may benefit from discharge to post-acute rehabilitation services  Please also refer to the recommendation of the Physical Therapist for safe discharge planning    Barriers to Discharge: Inaccessible home environment, Decreased caregiver support     PT Discharge Recommendation: Post acute rehabilitation services    See flowsheet documentation for full assessment

## 2022-12-05 NOTE — ASSESSMENT & PLAN NOTE
-CT head in ER: No acute intracranial abnormality   Encephalomalacia involving the right occipital lobe and bilateral cerebelli   Grossly stable appearance of periventricular hypoattenuation compared to MRI brain 8/29/2022, likely representing chronic microvascular ischemic changes    -H/o CVA x 3 in setting of antiphospholipid syndrome  -cont Coumadin for anticoagulation  -Awaiting placement to SNF for rehabilitation case management following

## 2022-12-05 NOTE — OCCUPATIONAL THERAPY NOTE
Occupational Therapy Progress Note     Patient Name: Ana Bernstein  [id number] Date: 12/5/2022  Problem List  Principal Problem:    SHIMON (generalized anxiety disorder)  Active Problems:    Atrial fibrillation    Antiphospholipid antibody with hypercoagulable state     Tremor of right hand    History of CVA (cerebrovascular accident)    Multiple electrolyte abnormalities    Severe protein-calorie malnutrition     Hyperthyroidism    Cognitive impairment    Diarrhea          12/05/22 1151   OT Last Visit   OT Visit Date 12/05/22   Note Type   Note Type Treatment   Pain Assessment   Pain Assessment Tool 0-10   Pain Score 6   Pain Location/Orientation Location: Buttocks  (rectal tube)   Hospital Pain Intervention(s) Repositioned; Ambulation/increased activity   Restrictions/Precautions   Other Precautions Cognitive; Chair Alarm; Bed Alarm;Multiple lines; Fall Risk;Pain   Lifestyle   Autonomy Per EMR, at baseline pt is I with ADLS and mobility  Reciprocal Relationships Sister   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   Intrinsic Gratification Building model cars and walking   ADL   Grooming Assistance 5  Supervision/Setup   Grooming Deficit Brushing hair   Grooming Comments washes hair with shampoo cap in chair, brushes hair at sink with CGA to maintain stance   Bed Mobility   Supine to Sit Unable to assess   Sit to Supine Unable to assess   Additional Comments Pt seated OOB in chair   Transfers   Sit to Stand 3  Moderate assistance   Additional items Assist x 1; Increased time required   Stand to Sit 4  Minimal assistance   Additional items Assist x 1; Increased time required   Additional Comments transfers with RW   Functional Mobility   Functional Mobility 4  Minimal assistance   Additional Comments initially MOD A, progressed to MIN   Additional items Rolling walker   Cognition   Overall Cognitive Status Impaired   Arousal/Participation Responsive; Cooperative   Attention Attends with cues to redirect Orientation Level Oriented to person;Oriented to place;Oriented to time   Memory Decreased short term memory   Following Commands Follows one step commands with increased time or repetition   Comments Pt continues with flat affect but more alert and continues to be more interactive  Cognition Assessment Tools MOCA   Score 21   Additional Activities   Additional Activities Comments Pt engaged in dynamic standing balance task with fnxl item retrieval/reaching with cognitive tasks   Activity Tolerance   Activity Tolerance Patient tolerated treatment well   Medical Staff Made Aware RN clearance for session   Assessment   Assessment Patient participated in Skilled OT session this date with interventions consisting of ADL re training with the use of correct body mechnaics, Energy Conservation techniques, safety awareness and fall prevention techniques,  therapeutic activities to: increase activity tolerance, increase dynamic sit/ stand balance during functional activity  and increase OOB/ sitting tolerance   Upon arrival patient was found seated OOB to Chair  Pt demonstrated the following tasks: MOD A STS, MIN A stand to sit  Pt initially requires MOD A with RW for fnxl mobility, progressed to MIN A  Washes hair sitting with S, CGA to brush hair standing at sink  Pt also engaged in dynamic standing balance task with fnxl item retrieval/reaching + cognitive activities  Pt performed [place] Cognitive Assessment and score 21/30 indicating a mild cognitive impairment  Please see further details regarding score below  Patient continues to be functioning below baseline level, occupational performance remains limited secondary to factors listed above and increased risk for falls and injury  From OT standpoint, recommendation at time of d/c would be STR    Patient to benefit from continued Occupational Therapy treatment while in the hospital to address deficits as defined above and maximize level of functional independence with ADLs and functional mobility  Pt was left in chair with alarm on after session with all current needs met  The patient's raw score on the AM-PAC Daily Activity inpatient short form is 15, standardized score is 34 69, less than 39 4  Patients at this level are likely to benefit from discharge to post-acute rehabilitation services  Please refer to the recommendation of the Occupational Therapist for safe discharge planning  Plan   Treatment Interventions ADL retraining;Functional transfer training;UE strengthening/ROM; Endurance training;Cognitive reorientation;Patient/family training;Equipment evaluation/education; Compensatory technique education;Continued evaluation; Energy conservation; Activityengagement   Goal Expiration Date 12/21/22   OT Treatment Day 11   OT Frequency   (2-4x/wk)   Recommendation   OT Discharge Recommendation Post acute rehabilitation services   [facility] Daily Activity Inpatient   Lower Body Dressing 2   Bathing 2   Toileting 2   Upper Body Dressing 3   Grooming 3   Eating 3   Daily Activity Raw Score 15   Daily Activity Standardized Score (Calc for Raw Score >=11) 34 69   AM-[facility] Applied Cognition Inpatient   Following a Speech/Presentation 3   Understanding Ordinary Conversation 4   Taking Medications 3   Remembering Where Things Are Placed or Put Away 3   Remembering List of 4-5 Errands 2   Taking Care of Complicated Tasks 1   Applied Cognition Raw Score 16   Applied Cognition Standardized Score 35 03   MOCA   Version 7 1   Visuopatial/Executive 5   Naming 3   Memory 0   Attention: Digits 1   Attention: Letters 0   Attention: Serial 3   Language: Repeat 2   Language: Fluency 0   Abstraction 0   Delayed Recall 1   Orientation 6   Does patient have less than or equal to 12 years of education? 0   MOCA Total Score 21       PT SEEN FOR LULU COGNITIVE ASSESSMENT  SCORED  21/30 INDICATING MILD COGNITIVE IMPAIRMENT FOR AGE/EDUCATION    SCORES ARE AS FOLLOWS:    VISUOSPATIAL/EXECUTIVE FUNCTION: 5/5  Pt was able to complete trail  Pt was able to copy cube  Pt was able to draw a clock, accurately place the numbers, and properly place the hands at ten past eleven  NAMING: 3/3  Pt able to name 3/3 animals  ATTENTION: 4/6  Pt was able to repeat sequence of numbers forwards but unable to repeat backwards  Pt unable to attend to sequence of letters  Pt was able to correctly subtract 7 from 100 5/5 times  LANGUAGE: 2/3  Pt was able to repeat sentences back to therapist  Pt was able to produce 8 words starting with the letter F in 1 minute  ABSTRACTION: 0/2  Pt was unable to identify the similarity of two items x2 trials  DELAYED RECALL: 1/5  Pt recalled 1/5 words without cues  Pt recalled 0/5 words with category cue  Pt recalled 1/5 words with multiple choice options  ORIENTATION: 6/6  Pt is oriented to date, month, year, day, place and city             Amparo Kaur MS, OTR/L

## 2022-12-05 NOTE — CASE MANAGEMENT
Case Management Discharge Planning Note    Patient name Kathy Cintron  Location [address] [place] [address] 907/[facility] [id number] MRN [id number]  : 1960 Date 2022       Current Admission Date: 11/3/2022  Current Admission Diagnosis:SHIMON (generalized anxiety disorder)   Patient Active Problem List    Diagnosis Date Noted   • Diarrhea 2022   • Cognitive impairment 2022   • Hyperthyroidism 11/10/2022   • Severe protein-calorie malnutrition  2022   • Hyponatremia 10/14/2022   • SIADH (syndrome of inappropriate ADH production) ([facility] ) 10/14/2022   • Subclinical hyperthyroidism 10/14/2022   • Sinus arrhythmia 10/10/2022   • Mild protein-calorie malnutrition ([facility] ) 10/07/2022   • Medical clearance for incarceration 2022   • 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 attack 2022   • Atypical chest pain 2022   • Multiple electrolyte abnormalities 2022   • Dizziness 2022   • Renal cyst 2022   • PVD (peripheral vascular disease) ([facility] ) 2022   • Ataxia 2022   • History of CVA (cerebrovascular accident) 2022   • Right inguinal pain 2022   • Alkaline phosphatase elevation 2022   • Low back pain 2022   • S/P laparoscopic hernia repair 2021   • Non-recurrent bilateral inguinal hernia without obstruction or gangrene 2021   • Gastric polyps 2021   • Gastric AVM 2021   • Edema of both lower legs 2021   • Tremor of right hand 2021   • Weakness of both lower extremities 2021   • Anticoagulated on Coumadin 2021   • CORIE (obstructive sleep apnea)    • Hyperbilirubinemia 08/10/2020   • Antiphospholipid antibody with hypercoagulable state  2020   • History of stroke 2020   • Other viral warts 2019   • Physical deconditioning 2019   • Class 2 obesity in adult 2018   • Bright red blood per rectum 11/10/2017   • Numbness 2017   • H/O aortic aneurysm repair 2017   • Hypertension 08/26/2017   • GERD (gastroesophageal reflux disease) 08/26/2017   • Hyperlipidemia 03/17/2017   • Peyronie disease 12/09/2016   • Vitamin D deficiency 06/15/2016   • Atrial fibrillation 11/17/2014   • SHIMON (generalized anxiety disorder) 11/11/2014   • Constipation 09/19/2014   • Irritable bowel syndrome 04/24/2014   • Kidney stones 04/24/2014      LOS (days): 32  Geometric Mean LOS (GMLOS) (days): 5 00  Days to GMLOS:-26 8     OBJECTIVE:  Risk of Unplanned Readmission Score: 32 96         Current admission status: Inpatient   Preferred Pharmacy:   [facility] [address] [place], [place] - [address]  Phone: [phone] Fax: [phone]    Primary Care Provider: Yaquelin Mares MD    Primary Insurance: [facility] [address]  Secondary Insurance:     DISCHARGE DETAILS:                   Additional Comments: CM spoke with provider regarding discharge  SLIM plans on reaching out to GI for followup  Will continue to awaiting clearance at this time

## 2022-12-05 NOTE — PLAN OF CARE
Problem: OCCUPATIONAL THERAPY ADULT  Goal: Performs self-care activities at highest level of function for planned discharge setting  See evaluation for individualized goals  Description: Treatment Interventions: ADL retraining, Functional transfer training, UE strengthening/ROM, Endurance training, Patient/family training, Cognitive reorientation, Equipment evaluation/education, Compensatory technique education, Continued evaluation, Energy conservation, Activityengagement          See flowsheet documentation for full assessment, interventions and recommendations  Outcome: Progressing  Note: Limitation: Decreased ADL status, Decreased endurance, Decreased cognition, Decreased self-care trans, Decreased high-level ADLs  Prognosis: Fair  Assessment: Patient participated in Skilled OT session this date with interventions consisting of ADL re training with the use of correct body mechnaics, Energy Conservation techniques, safety awareness and fall prevention techniques,  therapeutic activities to: increase activity tolerance, increase dynamic sit/ stand balance during functional activity  and increase OOB/ sitting tolerance   Upon arrival patient was found seated OOB to Chair  Pt demonstrated the following tasks: MOD A STS, MIN A stand to sit  Pt initially requires MOD A with RW for fnxl mobility, progressed to MIN A  Washes hair sitting with S, CGA to brush hair standing at sink  Pt also engaged in dynamic standing balance task with fnxl item retrieval/reaching + cognitive activities  Pt performed [place] Cognitive Assessment and score 21/30 indicating a mild cognitive impairment  Please see further details regarding score below  Patient continues to be functioning below baseline level, occupational performance remains limited secondary to factors listed above and increased risk for falls and injury  From OT standpoint, recommendation at time of d/c would be STR    Patient to benefit from continued Occupational Therapy treatment while in the hospital to address deficits as defined above and maximize level of functional independence with ADLs and functional mobility  Pt was left in chair with alarm on after session with all current needs met  The patient's raw score on the AM-PAC Daily Activity inpatient short form is 15, standardized score is 34 69, less than 39 4  Patients at this level are likely to benefit from discharge to post-acute rehabilitation services  Please refer to the recommendation of the Occupational Therapist for safe discharge planning       OT Discharge Recommendation: Post acute rehabilitation services

## 2022-12-05 NOTE — PHYSICAL THERAPY NOTE
PHYSICAL THERAPY NOTE          Patient Name: Luther Lamar  KZSFK'E Date: 12/5/2022 12/05/22 1007   PT Last Visit   PT Visit Date 12/05/22   Note Type   Note Type Treatment   Pain Assessment   Pain Assessment Tool 0-10   Pain Score No Pain   Restrictions/Precautions   Weight Bearing Precautions Per Order No   Other Precautions Cognitive; Chair Alarm; Bed Alarm;Multiple lines; Fall Risk  (rectal tube)   General   Chart Reviewed Yes   Response to Previous Treatment Patient with no complaints from previous session  Family/Caregiver Present No   Cognition   Overall Cognitive Status Impaired   Arousal/Participation Responsive; Cooperative   Attention Attends with cues to redirect   Orientation Level Oriented to person;Oriented to place   Memory Decreased short term memory   Following Commands Follows one step commands with increased time or repetition   Comments pt continues to be fixated on rectal tube with some re-direction required  Pt much more alaert + engaged in therapy session, pleasant throughout  Bed Mobility   Supine to Sit 4  Minimal assistance   Additional items Assist x 1;HOB elevated; Bedrails; Increased time required;Verbal cues   Sit to Supine Unable to assess   Additional Comments pt supine in bed upon arrival  Pt returned to sitting OOB in recliner with all needs wihtin reach- chair alarm on   Transfers   Sit to Stand 3  Moderate assistance   Additional items Assist x 1; Increased time required;Verbal cues   Stand to Sit 4  Minimal assistance   Additional items Assist x 1; Increased time required;Verbal cues   Additional Comments transfers with RW, cues for hand placement and sequencing  (pt completes 4x STS throughout session)   Ambulation/Elevation   Gait pattern Excessively slow; Short stride; Foward flexed;Decreased foot clearance; Improper Weight shift;Narrow TEE   Gait Assistance 3  Moderate assist  (progressing to min A for final gait trial)   Additional items Assist x 1;Verbal cues; Tactile cues   Assistive Device Rolling walker   Distance 5ft; 15ft; 30ft   Balance   Static Sitting Fair +   Dynamic Sitting Fair   Static Standing Fair -   Dynamic Standing Poor +   Ambulatory Poor +   Endurance Deficit   Endurance Deficit Yes   Endurance Deficit Description cognition, generalized fatigue/ weakness   Activity Tolerance   Activity Tolerance Patient tolerated treatment well   Nurse Made Aware RN cleared pt to participate in therapy session; [place] present throughout session for chair follow   Assessment   Prognosis Fair   Problem List Decreased strength;Decreased range of motion;Decreased endurance; Impaired balance;Decreased mobility; Decreased cognition; Impaired judgement;Decreased safety awareness;Pain   Assessment Pt seen for PT treatment session this date  Therapy session focused on bed mobility, functional transfers, gait training and endurance training in order to improve overall mobility and independence  Pt requires assist of 1 + chair follow/ SBA of another for safety  Pt with improvements in all mobility performed + cognitive status this date  Pt completes bed mobility tasks with min A, functional transfers with mod Ax1 and ambulation with mod A progressing to min A with distance  Pt continues to require VC for widening TEE/ increasing step length  Seated rest breaks required between gait trials  Pt making steady progress toward goals  Pt was left sitting OOB in recliner at the end of PT session with all needs in reach  Pt would benefit from continued PT services while in hospital to address remaining limitations  PT to continue to follow pt and recommends rehab  The patient's AM-PAC Basic Mobility Inpatient Short Form Raw Score is 14  A Raw score of less than or equal to 16 suggests the patient may benefit from discharge to post-acute rehabilitation services   Please also refer to the recommendation of the Physical Therapist for safe discharge planning  Barriers to Discharge Inaccessible home environment;Decreased caregiver support   Goals   Patient Goals to talk with doctor to get rectal tube out   STG Expiration Date 12/09/22   PT Treatment Day 12   Plan   Treatment/Interventions Functional transfer training;LE strengthening/ROM; Endurance training;Patient/family training;Equipment eval/education; Bed mobility;Gait training;Spoke to nursing;Spoke to case management   Progress Progressing toward goals   PT Frequency 2-3x/wk   Recommendation   PT Discharge Recommendation Post acute rehabilitation services   Equipment Recommended [facility] Recommended Wheeled walker   AM-PAC Basic Mobility Inpatient   Turning in Bed Without Bedrails 3   Lying on Back to Sitting on Edge of Flat Bed 3   Moving Bed to Chair 2   Standing Up From Chair 2   Walk in Room 2   Climb 3-5 Stairs 2   Basic Mobility Inpatient Raw Score 14   Basic Mobility Standardized Score 35 55   Highest Level Of Mobility   -HLM Goal 4: Move to chair/commode   JH-HLM Achieved 7: Walk 25 feet or more   Education   Education Provided Mobility training;Assistive device   Patient Demonstrates acceptance/verbal understanding   End of Consult   Patient Position at End of Consult Bedside chair;Bed/Chair alarm activated; All needs within reach     Kimberlee Louie, PT, DPT

## 2022-12-05 NOTE — PROGRESS NOTES
[address] [facility]  Progress Note - Juan Regalado 1960, 58 y o  male MRN: [id number]  Unit/Bed#: [facility] 907-01 Encounter: [id number]  Primary Care Provider: Jatin Gutierrez MD   Date and time admitted to hospital: 11/3/2022  3:28 PM    * SHIMON (generalized anxiety disorder)  Assessment & Plan  -Previous admission from the end of September until the end of October at [facility] in which he was hospitalized for over a month for severe anxiety and inability to care for self  -Per sister reportedly was able to ambulate prior to the hospitalization but after the hospitalization patient needing assistance with ambulation at home  -During hospitalization there patient developed hyponatremia during his course of stay and Zoloft that had been started was stopped due to hyponatremia  -Recently discharged from inpatient psychiatry with mirtazapine and aripiprazole  -Pt seen in consultation by Psychiatry, started on Buspar/Lexapro  -Reconsulted psychiatry for re-evaluation/possible IP psych admission for [facility] - virtual psychiatry on 11/18/22 recommended inpatient psychiatry admission, in-person evaluation with our on-site psychiatrist 11/21/22 did not feel this was necessary  Recommended increasing escitalopram, making lorazepam scheduled as patient does not ask for it but his anxiety might benefit from it, and continuing BusPar   Overall plan is to help patient improve his anxiety so that he can get to rehab   -Per updated discussion w/ psychiatry -> continue current Lexapro/Buspar/Ativan regimen -> okay for discharge to skilled rehab from a  awaiting placement - case management following    Diarrhea  Assessment & Plan  -Ongoing watery diarrhea  -Rectal tube remains in place  -Stool C diff PCR negative  -Stool studies negative  -cont cholestyramine  -GI saw in consult, will ask them to see again today      Cognitive impairment  Assessment & Plan  -Per neuropsychology evaluation on 11/14/22, patient does NOT have capacity to make medical decisions   -Patient also has some level of cognitive impairment   -B12 borderline low, continue supplementation      Hyperthyroidism  Assessment & Plan  -Per chart review patient has had low TSH and elevated free T4 in the past   -Most recent labs during this admission with TSH 0 282 and free t4 1 56  -Thyroid U/S during last admission at [facility] was unremarkable   -Evaluated by endocrinology in the past during previous admission at [facility] (Oct 22) and they recommend get repeat TFT, TSI and thyroid abs   -Endocrinology input noted patient likely subclinical hypothyroidism  -Outpatient endocrinology follow-up recommended    Severe protein-calorie malnutrition   Assessment & Plan  -Encourage adequate protein and calorie intake    Multiple electrolyte abnormalities  Assessment & Plan  -currently K and Mg normal  -cont PO K    Tremor of right hand  Assessment & Plan  -will need outpatient follow-up w/ neurology     History of CVA (cerebrovascular accident)  Assessment & Plan  -CT head in ER: No acute intracranial abnormality   Encephalomalacia involving the right occipital lobe and bilateral cerebelli   Grossly stable appearance of periventricular hypoattenuation compared to MRI brain 8/29/2022, likely representing chronic microvascular ischemic changes  -H/o CVA x 3 in setting of antiphospholipid syndrome  -cont Coumadin for anticoagulation  -Awaiting placement to SNF for rehabilitation case management following    Antiphospholipid antibody with hypercoagulable state   Assessment & Plan  -Continue Coumadin, Goal INR 2-3  -had ischemic stroke in the past despite Eliquis, hence, deemed Eliquis failure      Atrial fibrillation  Assessment & Plan  -cont Toprol-XL/coumadin   -cont to trend INR        VTE Pharmacologic Prophylaxis: VTE Score: 3 coumadin    Patient Centered Rounds: I performed bedside rounds with nursing staff today    Discussions with Specialists or Other Care Team Provider: GI    Time Spent for Care: 20 minutes  More than 50% of total time spent on counseling and coordination of care as described above  Current Length of Stay: 32 day(s)  Current Patient Status: Inpatient   Certification Statement: The patient will continue to require additional inpatient hospital stay due to diarrhea  Discharge Plan: Anticipate discharge in 24-48 hrs to rehab facility  Code Status: Level 3 - DNAR and DNI    Subjective:   Pt denies CP/SOB/abd pain  Objective:     Vitals:   Temp (24hrs), Av 4 °F (36 3 °C), Min:97 3 °F (36 3 °C), Max:97 5 °F (36 4 °C)    Temp:  [97 3 °F (36 3 °C)-97 5 °F (36 4 °C)] 97 5 °F (36 4 °C)  HR:  [90] 90  Resp:  [15-18] 18  BP: (136-146)/(84-87) 146/87  Body mass index is 27 12 kg/m²  Input and Output Summary (last 24 hours):      Intake/Output Summary (Last 24 hours) at 2022 1108  Last data filed at 2022 0900  Gross per 24 hour   Intake 120 ml   Output --   Net 120 ml       Physical Exam:   Gen: NAD, Awake and alert, well developed, well nourished  Eyes: EOMI, PERRLA, no scleral icterus  ENMT:  no nasal discharge, no otic discharge, moist mucous membranes  Neck:  Supple  Cardiovascular:  Regular rate and rhythm, normal S1-S2, no murmurs, rubs, or gallops  Lungs:  Clear to auscultation bilaterally, no wheezes, or rales, or rhonchi  Abdomen:  Positive bowel sounds, soft, nontender, nondistended, no palpable organomegaly, rectal tube in place with loose brown stool   Skin:  Intact, no obvious lesions or rashes, no edema  Neuro: Cranial nerves 2-12 are intact, non-focal, moves all 4 extremities      Additional Data:     Labs:  Results from last 7 days   Lab Units 22  0445 22  0521 22  0455   WBC Thousand/uL 6 70   < > 6 28   HEMOGLOBIN g/dL 10 7*   < > 11 1*   HEMATOCRIT % 33 6*   < > 33 9*   PLATELETS Thousands/uL 175   < > 151   NEUTROS PCT %  --   --  79*   LYMPHS PCT %  --   --  11*   MONOS PCT %  --   --  7   EOS PCT %  -- --  2    < > = values in this interval not displayed       Results from last 7 days   Lab Units 12/02/22  0445   SODIUM mmol/L 140   POTASSIUM mmol/L 4 1   CHLORIDE mmol/L 111*   CO2 mmol/L 24   BUN mg/dL 9   CREATININE mg/dL 0 53*   ANION GAP mmol/L 5   CALCIUM mg/dL 8 9   ALBUMIN g/dL 2 0*   GLUCOSE RANDOM mg/dL 104     Results from last 7 days   Lab Units 12/05/22  0444   INR  2 32*                   Lines/Drains:  Invasive Devices     Peripheral Intravenous Line  Duration           Peripheral IV 12/04/22 Right;Upper;Ventral (anterior) Arm 1 day          Drain  Duration           Rectal Tube With balloon 11 days                Recent Cultures (last 7 days):         Last 24 Hours Medication List:   Current Facility-Administered Medications   Medication Dose Route Frequency Provider Last Rate   • busPIRone  5 mg Oral BID Marito Foote MD     • cholestyramine sugar free  4 g Oral BID Idania Acuña MD     • vitamin B-12  500 mcg Oral Daily Jasmit Kaylie, DO     • cyanocobalamin  1,000 mcg Intramuscular Q30 Days Tana Lott, DO     • escitalopram  10 mg Oral Daily Greer Salter MD     • fluticasone  1 spray Each Nare Daily Tana Lott, DO     • folic acid  1 mg Oral Daily Marito Foote MD     • LORazepam  0 5 mg Oral BID Ton Gentile MD     • metoprolol  5 mg Intravenous Q6H PRN Lucien Sanders MD     • metoprolol succinate  100 mg Oral Daily Ton Gentile MD     • multivitamin stress formula  1 tablet Oral Daily Marito Foote MD     • pantoprazole  40 mg Oral Early Morning Tana Lott, DO     • potassium chloride  40 mEq Oral BID Ton Gentile MD     • sodium chloride  1 spray Each Nare Q1H PRN Ton Gentile MD     • thiamine  100 mg Oral Daily Marito Foote MD     • warfarin  10 mg Oral QPM Gustabo Mcgee DO          Today, Patient Was Seen By: Fabi Renteria MD    **Please Note: This note may have been constructed using a voice recognition system  **

## 2022-12-05 NOTE — PLAN OF CARE
Problem: Potential for Falls  Goal: Patient will remain free of falls  Description: INTERVENTIONS:  - Educate patient/family on patient safety including physical limitations  - Instruct patient to call for assistance with activity   - Consult OT/PT to assist with strengthening/mobility   - Keep Call bell within reach  - Keep bed low and locked with side rails adjusted as appropriate  - Keep care items and personal belongings within reach  - Initiate and maintain comfort rounds  - Make Fall Risk Sign visible to staff  - Apply yellow socks and bracelet for high fall risk patients  - Consider moving patient to room near nurses station  Outcome: Progressing     Problem: PAIN - ADULT  Goal: Verbalizes/displays adequate comfort level or baseline comfort level  Description: Interventions:  - Encourage patient to monitor pain and request assistance  - Assess pain using appropriate pain scale  - Administer analgesics based on type and severity of pain and evaluate response  - Implement non-pharmacological measures as appropriate and evaluate response  - Consider cultural and social influences on pain and pain management  - Notify physician/advanced practitioner if interventions unsuccessful or patient reports new pain  Outcome: Progressing     Problem: SAFETY ADULT  Goal: Patient will remain free of falls  Description: INTERVENTIONS:  - Educate patient/family on patient safety including physical limitations  - Instruct patient to call for assistance with activity   - Consult OT/PT to assist with strengthening/mobility   - Keep Call bell within reach  - Keep bed low and locked with side rails adjusted as appropriate  - Keep care items and personal belongings within reach  - Initiate and maintain comfort rounds  - Make Fall Risk Sign visible to staff  - Apply yellow socks and bracelet for high fall risk patients  - Consider moving patient to room near nurses station  Outcome: Progressing     Problem: Prexisting or High Potential for Compromised Skin Integrity  Goal: Skin integrity is maintained or improved  Description: INTERVENTIONS:  - Identify patients at risk for skin breakdown  - Assess and monitor skin integrity  - Assess and monitor nutrition and hydration status  - Monitor labs   - Assess for incontinence   - Turn and reposition patient  - Assist with mobility/ambulation  - Relieve pressure over bony prominences  - Avoid friction and shearing  - Provide appropriate hygiene as needed including keeping skin clean and dry  - Evaluate need for skin moisturizer/barrier cream  - Collaborate with interdisciplinary team   - Patient/family teaching  - Consider wound care consult   Outcome: Progressing

## 2022-12-06 ENCOUNTER — APPOINTMENT (INPATIENT)
Dept: RADIOLOGY | Facility: HOSPITAL | Age: 62
End: 2022-12-06

## 2022-12-06 LAB
CRP SERPL QL: 18.6 MG/L
INR PPP: 2.66 (ref 0.84–1.19)
PROTHROMBIN TIME: 28.6 SECONDS (ref 11.6–14.5)

## 2022-12-06 RX ADMIN — B-COMPLEX W/ C & FOLIC ACID TAB 1 TABLET: TAB at 08:39

## 2022-12-06 RX ADMIN — BUSPIRONE HYDROCHLORIDE 5 MG: 5 TABLET ORAL at 08:39

## 2022-12-06 RX ADMIN — METOPROLOL SUCCINATE 100 MG: 100 TABLET, EXTENDED RELEASE ORAL at 08:39

## 2022-12-06 RX ADMIN — LOPERAMIDE HYDROCHLORIDE 2 MG: 2 CAPSULE ORAL at 08:39

## 2022-12-06 RX ADMIN — PANTOPRAZOLE SODIUM 40 MG: 40 TABLET, DELAYED RELEASE ORAL at 05:35

## 2022-12-06 RX ADMIN — POTASSIUM CHLORIDE 40 MEQ: 1500 TABLET, EXTENDED RELEASE ORAL at 08:39

## 2022-12-06 RX ADMIN — LOPERAMIDE HYDROCHLORIDE 2 MG: 2 CAPSULE ORAL at 16:23

## 2022-12-06 RX ADMIN — CYANOCOBALAMIN TAB 500 MCG 500 MCG: 500 TAB at 08:39

## 2022-12-06 RX ADMIN — LOPERAMIDE HYDROCHLORIDE 2 MG: 2 CAPSULE ORAL at 13:01

## 2022-12-06 RX ADMIN — LOPERAMIDE HYDROCHLORIDE 2 MG: 2 CAPSULE ORAL at 21:17

## 2022-12-06 RX ADMIN — FLUTICASONE PROPIONATE 1 SPRAY: 50 SPRAY, METERED NASAL at 08:40

## 2022-12-06 RX ADMIN — THIAMINE HCL TAB 100 MG 100 MG: 100 TAB at 08:39

## 2022-12-06 RX ADMIN — LORAZEPAM 0.5 MG: 0.5 TABLET ORAL at 16:29

## 2022-12-06 RX ADMIN — BUSPIRONE HYDROCHLORIDE 5 MG: 5 TABLET ORAL at 16:29

## 2022-12-06 RX ADMIN — LORAZEPAM 0.5 MG: 0.5 TABLET ORAL at 08:39

## 2022-12-06 RX ADMIN — FOLIC ACID 1 MG: 1 TABLET ORAL at 08:39

## 2022-12-06 RX ADMIN — ESCITALOPRAM OXALATE 10 MG: 10 TABLET ORAL at 08:39

## 2022-12-06 RX ADMIN — POTASSIUM CHLORIDE 40 MEQ: 1500 TABLET, EXTENDED RELEASE ORAL at 16:29

## 2022-12-06 NOTE — PROGRESS NOTES
[address] [facility]  Progress Note - Shoshana Gravely 1960, 58 y o  male MRN: [id number]  Unit/Bed#: [facility] 907-01 Encounter: [id number]  Primary Care Provider: Annmarie Baez MD   Date and time admitted to hospital: 11/3/2022  3:28 PM    * SHIMON (generalized anxiety disorder)  Assessment & Plan  -Previous admission from the end of September until the end of October at [facility] in which he was hospitalized for over a month for severe anxiety and inability to care for self  -Per sister reportedly was able to ambulate prior to the hospitalization but after the hospitalization patient needing assistance with ambulation at home  -During hospitalization there patient developed hyponatremia during his course of stay and Zoloft that had been started was stopped due to hyponatremia  -Recently discharged from inpatient psychiatry with mirtazapine and aripiprazole  -Pt seen in consultation by Psychiatry, started on Buspar/Lexapro  -Reconsulted psychiatry for re-evaluation/possible IP psych admission for [facility] - virtual psychiatry on 11/18/22 recommended inpatient psychiatry admission, in-person evaluation with our on-site psychiatrist 11/21/22 did not feel this was necessary  Recommended increasing escitalopram, making lorazepam scheduled as patient does not ask for it but his anxiety might benefit from it, and continuing BusPar   Overall plan is to help patient improve his anxiety so that he can get to rehab   -Per latest discussion w/ psychiatry -> continue current Lexapro/Buspar/Ativan regimen -> okay for discharge to skilled rehab from a  awaiting placement - case management following    Diarrhea  Assessment & Plan  -Ongoing watery diarrhea  -Rectal tube remains in place  -Stool C diff PCR negative  -Stool studies negative  -cont cholestyramine  -standing imodium increased to QID on 12/5/22  -GI saw in consult, will ask them to see again today      Cognitive impairment  Assessment & Plan  -Per neuropsychology evaluation on 11/14/22, patient does NOT have capacity to make medical decisions   -Patient also has some level of cognitive impairment   -B12 borderline low, continue supplementation      Hyperthyroidism  Assessment & Plan  -Per chart review patient has had low TSH and elevated free T4 in the past   -Most recent labs during this admission with TSH 0 282 and free t4 1 56  -Thyroid U/S during last admission at [address] [place] [address] was unremarkable   -Evaluated by endocrinology in the past during previous admission at [address] [place] [address] (Oct 22) and they recommend get repeat TFT, TSI and thyroid abs   -Endocrinology input noted patient likely subclinical hypothyroidism  -Outpatient endocrinology follow-up recommended    Severe protein-calorie malnutrition   Assessment & Plan  -Encourage adequate protein and calorie intake    Multiple electrolyte abnormalities  Assessment & Plan  -currently K and Mg normal  -cont PO K    Tremor of right hand  Assessment & Plan  -will need outpatient follow-up w/ neurology     History of CVA (cerebrovascular accident)  Assessment & Plan  -CT head in ER: No acute intracranial abnormality   Encephalomalacia involving the right occipital lobe and bilateral cerebelli   Grossly stable appearance of periventricular hypoattenuation compared to MRI brain 8/29/2022, likely representing chronic microvascular ischemic changes    -H/o CVA x 3 in setting of antiphospholipid syndrome  -cont Coumadin for anticoagulation  -Awaiting placement to SNF for rehabilitation case management following    Antiphospholipid antibody with hypercoagulable state   Assessment & Plan  -Continue Coumadin, Goal INR 2-3  -had ischemic stroke in the past despite Eliquis, hence, deemed Eliquis failure      Atrial fibrillation  Assessment & Plan  -cont Toprol-XL/coumadin   -cont to trend INR        VTE Pharmacologic Prophylaxis: VTE Score: 3 coumadin    Patient Centered Rounds: I performed bedside rounds with nursing staff today   Discussions with Specialists or Other Care Team Provider: GI    Time Spent for Care: 20 minutes  More than 50% of total time spent on counseling and coordination of care as described above  Current Length of Stay: 33 day(s)  Current Patient Status: Inpatient   Certification Statement: The patient will continue to require additional inpatient hospital stay due to diarrhea  Discharge Plan: once rectal tube can be removed    Code Status: Level 3 - DNAR and DNI    Subjective:   Patient without new complaints  Objective:     Vitals:   Temp (24hrs), Av 4 °F (36 3 °C), Min:97 °F (36 1 °C), Max:97 6 °F (36 4 °C)    Temp:  [97 °F (36 1 °C)-97 6 °F (36 4 °C)] 97 °F (36 1 °C)  HR:  [89-90] 90  Resp:  [16-18] 16  BP: (111-137)/(73-85) 137/85  Body mass index is 27 12 kg/m²  Input and Output Summary (last 24 hours):      Intake/Output Summary (Last 24 hours) at 2022 1127  Last data filed at 2022 0801  Gross per 24 hour   Intake 660 ml   Output 450 ml   Net 210 ml       Physical Exam:   Gen: Remains NAD, Awake and alert, well developed, well nourished  Eyes: EOMI, PERRLA, no scleral icterus  ENMT:  no nasal discharge, no otic discharge, moist mucous membranes  Neck:  Supple  Cardiovascular:  Regular rate and rhythm, normal S1-S2, no murmurs, rubs, or gallops  Lungs:  Clear to auscultation bilaterally, no wheezes, or rales, or rhonchi  Abdomen: Remains Positive bowel sounds, soft, nontender, nondistended, no palpable organomegaly, rectal tube in place with loose brown stool   Skin:  Intact, no obvious lesions or rashes, no edema  Neuro: Cranial nerves 2-12 are intact, non-focal, moves all 4 extremities, resting tremor    Additional Data:     Labs:  Results from last 7 days   Lab Units 22  0445   WBC Thousand/uL 6 70   HEMOGLOBIN g/dL 10 7*   HEMATOCRIT % 33 6*   PLATELETS Thousands/uL 175     Results from last 7 days   Lab Units 22  0445   SODIUM mmol/L 140   POTASSIUM mmol/L 4 1 CHLORIDE mmol/L 111*   CO2 mmol/L 24   BUN mg/dL 9   CREATININE mg/dL 0 53*   ANION GAP mmol/L 5   CALCIUM mg/dL 8 9   ALBUMIN g/dL 2 0*   GLUCOSE RANDOM mg/dL 104     Results from last 7 days   Lab Units 12/06/22  0555   INR  2 66*                   Lines/Drains:  Invasive Devices     Peripheral Intravenous Line  Duration           Peripheral IV 12/04/22 Right;Upper;Ventral (anterior) Arm 2 days          Drain  Duration           Rectal Tube With balloon 12 days                Recent Cultures (last 7 days):         Last 24 Hours Medication List:   Current Facility-Administered Medications   Medication Dose Route Frequency Provider Last Rate   • busPIRone  5 mg Oral BID Hernán Lara MD     • cholestyramine sugar free  4 g Oral BID Rafa Tirado MD     • vitamin B-12  500 mcg Oral Daily Jasmit Kaylie, DO     • cyanocobalamin  1,000 mcg Intramuscular Q30 Days Tana Lott, DO     • escitalopram  10 mg Oral Daily Kathy Salter MD     • fluticasone  1 spray Each Nare Daily Tana Lott DO     • folic acid  1 mg Oral Daily Hernán Lara MD     • loperamide  2 mg Oral 4x Daily Flaco Easton MD     • LORazepam  0 5 mg Oral BID Tata Garcia MD     • metoprolol  5 mg Intravenous Q6H PRN Jeanine Maravilla MD     • metoprolol succinate  100 mg Oral Daily Tata Garcia MD     • multivitamin stress formula  1 tablet Oral Daily Hernán Lara MD     • pantoprazole  40 mg Oral Early Morning Tana Lott DO     • potassium chloride  40 mEq Oral BID Tata Garcia MD     • sodium chloride  1 spray Each Nare Q1H PRN Tata Garcia MD     • thiamine  100 mg Oral Daily Hernán Lara MD     • warfarin  10 mg Oral QPM Gustabo Mcgee DO          Today, Patient Was Seen By: Sharon Marie MD    **Please Note: This note may have been constructed using a voice recognition system  **

## 2022-12-06 NOTE — PROGRESS NOTES
Pastoral Care Progress Note    2022  Patient: Frankie Shoulder : 1960  Admission Date & Time: 11/3/2022 1528  MRN: [id number] CSN: [id number]      On call  Shalonda Frank responded to code RRT at [address]  Pt was busy with providers, no family was present  Hospital supervisor declined the need for bedside pastoral care at this time   remain available                 Chaplaincy Interventions Utilized:       Relationship Building: Cultivated a relationship of care and support       22 1800   Clinical Encounter Type   Visited With Patient not available   Crisis Visit Code  (RRT)

## 2022-12-06 NOTE — CODE DOCUMENTATION
RRT initiated for unwitnessed fall  1835: C-collar placed  Patient assisted back to bed  Providers art bedside

## 2022-12-06 NOTE — PLAN OF CARE
Problem: PHYSICAL THERAPY ADULT  Goal: Performs mobility at highest level of function for planned discharge setting  See evaluation for individualized goals  Description: Treatment/Interventions: LE strengthening/ROM, Functional transfer training, Endurance training, Patient/family training, Bed mobility, Cognitive reorientation, Therapeutic exercise          See flowsheet documentation for full assessment, interventions and recommendations  Outcome: Progressing  Note: Prognosis: Fair  Problem List: Decreased strength, Decreased range of motion, Decreased endurance, Impaired balance, Decreased mobility, Decreased cognition, Impaired judgement, Decreased safety awareness, Pain  Assessment: Pt seen for PT treatment session this date  Therapy session focused on functional transfers, gait training and endurance training in order to improve overall mobility and independence  Pt requires assist of 1 for all mobility performed this date  Pt with improvements in all functional mobility tasks this date, requires min A for all mobility tasks  Pt performed 2x STs to RW; cues for hand placement and sequencing  Pt ambulating increased distances this date with RW and min A; cues for widening TEE required  Seated rest break between trials required  Pt prefers to have chair follow for hallway ambulation  Pt making steady progress toward goals  Pt was left sitting OOB in recliner with all needs within reach at the end of PT session with all needs in reach  Pt would benefit from continued PT services while in hospital to address remaining limitations  PT to continue to follow pt and recommends STR  The patient's AM-PAC Basic Mobility Inpatient Short Form Raw Score is 17  A Raw score of greater than 16 suggests the patient may benefit from discharge to home  Please also refer to the recommendation of the Physical Therapist for safe discharge planning    Barriers to Discharge: Inaccessible home environment, Decreased caregiver support     PT Discharge Recommendation: Post acute rehabilitation services    See flowsheet documentation for full assessment

## 2022-12-06 NOTE — QUICK NOTE
RRT called for unwitnessed fall  Upon arrival the patient was in a c-collar  Case discussed with critical care at bedside  Will have CT scan of the head and neck  Patient's sister updated over the phone

## 2022-12-06 NOTE — RAPID RESPONSE
Rapid Response Note  Kelsey Carver 58 y o  male MRN: [id number]  Unit/Bed#: [facility] 907-01 Encounter: [id number]    Rapid Response Notification(s):   Response called date/time:  12/6/2022 6:27 PM  Response team arrival date/time:  12/6/2022 6:28 PM  Response end date/time:  12/6/2022 6:40 PM  [facility]  [facility]  Rapid response location:  [facility] unit  Primary reason for rapid response call: Fall    Rapid Response Intervention(s):   Airway:  None  Breathing:  None  Circulation:  None  Fluids administered:  None  Medications administered:  None       Assessment:   · Altered mental status    Plan:   · CT head and c-spine wo contrast ordered - pending  · Maintain spinal precautions with c-collar until official CT cervical spine imaging is read  · Fall precautions, bed alarm on     Rapid Response Outcome:   Transfer:  Remain on floor    Family notified of transfer: n/a will remain on floor  Family member contacted: Patient's sister contacted by Luis Blanchard MD      Background/Situation:   Kelsey Carver is a 58 y o  male with a PMHx significant for CVAx3 with baseline cognitive impairment deficits, antiphospholipid syndrome on PTA warfarin, and severe anxiety who had an unwitnessed fall in his hospital room  Patient stated he was trying to return his dinner tray and his "legs just gave out"  Patient denies hitting his head when he fell  He denies cervical, thoracic, lumbar, or sacral pain  Upon arrival, patient was lying on the ground turned towards the right with a pillow under his head placed by the nursing staff  The patient denied any acute issues  C-collar and cervical precautions initiated due to spinal precaution protocol  CT head and cervical spine wo contrast ordered  Upon assessment, patient had generalized weakness of BUE and BLE; able to answer orientation questions and follow commands      Per H&P note, the patient presented to [facility] the evening of 11/3 with worsening fatigue as well as decreased appetite  Problem list addressed this hospitalization include SHIMON, diarrhea, ongoing cognitive impairment deemed NO capacity for medical decision making per Neuropsych, hyperthyroidism, severe malnutrition and electrolyte disturbance  Ongoing planning for discharge to [facility] for rehabilitation  Review of Systems   Constitutional: Negative for fatigue and fever  Eyes: Negative for visual disturbance  Respiratory: Negative for shortness of breath  Cardiovascular: Negative for chest pain  Gastrointestinal: Negative for abdominal pain  Musculoskeletal: Negative for back pain  Neurological: Positive for weakness (BLE)  Negative for dizziness and light-headedness  Objective:   Vitals:    12/06/22 1827 12/06/22 1829 12/06/22 1831 12/06/22 1832   BP: 98/70 101/69 98/70 93/66   Pulse:       Resp:       Temp:       TempSrc:       SpO2:   98%    Weight:       Height:         Physical Exam  Constitutional:       General: He is not in acute distress  HENT:      Head: Normocephalic and atraumatic  Mouth/Throat:      Mouth: Mucous membranes are dry  Pharynx: Oropharynx is clear  Eyes:      General: No scleral icterus  Extraocular Movements: Extraocular movements intact  Pupils: Pupils are equal, round, and reactive to light  Cardiovascular:      Rate and Rhythm: Normal rate  Pulses: Normal pulses  Pulmonary:      Effort: Pulmonary effort is normal  No respiratory distress  Abdominal:      Palpations: Abdomen is soft  Tenderness: There is no abdominal tenderness  Genitourinary:     Comments: Rectal tube in place with brown stool output noted in collection bag  Musculoskeletal:      Cervical back: No rigidity or tenderness  Right lower leg: Edema present  Left lower leg: Edema present  Skin:     General: Skin is dry  Capillary Refill: Capillary refill takes less than 2 seconds  Coloration: Skin is pale        Comments: Skin breakdown/redness noted on coccyx   Neurological:      Mental Status: He is oriented to person, place, and time  Motor: Weakness (BLE; up with 4x assist) present  Comments: Psych history   Psychiatric:         Behavior: Behavior normal          Portions of the record may have been created with voice recognition software  Occasional wrong word or "sound a like" substitutions may have occurred due to the inherent limitations of voice recognition software  Read the chart carefully and recognize, using context, where substitutions have occurred      Woody Quesada

## 2022-12-06 NOTE — WOUND OSTOMY CARE
Progress Note - Wound   Keaton Rodriguez 58 y o  male MRN: [id number]  Unit/Bed#: [facility] 907-01 Encounter: [id number]        Assessment:   Patient seen today for wound care follow up visit  Patient is incontinent of bowel and bladder  Patient is min/mod assist with standing for assessment  Patient is independent with meals  1  HA evolving DTI sacral/buttocks- full thickness wound with slough tissue and surrounding nonblanchable erythema, scant serosanguineous drainage  No induration, fluctuance, odor, warmth/temperature differences, redness, or purulence noted to the above noted wounds and skin areas assessed  New dressings applied per orders listed below  Patient tolerated well- no s/s of non-verbal pain or discomfort observed during the encounter  Bedside nurse aware of plan of care  See flow sheets for more detailed assessment findings  Wound care will continue to follow  Skin Care Plan:   1-Apply TRIAD Paste to Sacro-Buttocks Wound TID and PRN episodes of incontinence   2-Turn/reposition q2h or when medically stable for pressure re-distribution on skin   3-Elevate heels to offload pressure  4-Moisturize skin daily with skin nourishing cream   5-Ehob cushion in chair when out of bed  6-Preventative hydraguard to bilateral heels BID and PRN    7-P-500 Specialty mattress ordered for patient     Wound 11/06/22  Buttocks Left (Active)   Wound Image   12/06/22 0938   Wound Description Non-blanchable erythema;Slough;Pink 12/06/22 0938   Pressure Injury Stage DTPI 12/06/22 0938   Reena-wound Assessment Clean;Dry; Intact 12/06/22 0938   Wound Length (cm) 8 cm 12/06/22 0938   Wound Width (cm) 5 cm 12/06/22 0938   Wound Depth (cm) 0 2 cm 12/06/22 0938   Wound Surface Area (cm^2) 40 cm^2 12/06/22 0938   Wound Volume (cm^3) 8 cm^3 12/06/22 0938   Calculated Wound Volume (cm^3) 8 cm^3 12/06/22 0938   Change in Wound Size % -7900 12/06/22 0938   Tunneling 0 cm 12/06/22 0938   Tunneling in depth located at 0 12/06/22 0938   Undermining 0 12/06/22 0938   Undermining is depth extending from 0 12/06/22 0938   Wound Site Closure REBECCA 12/06/22 0938   Drainage Amount Scant 12/06/22 0938   Drainage Description Serosanguineous 12/06/22 0938   Non-staged Wound Description Full thickness 12/06/22 0938   Treatments Cleansed 12/06/22 0938   Dressing Moisture barrier 12/06/22 0938   Wound packed? No 12/06/22 0938   Packing- # removed 0 12/06/22 0938   Packing- # inserted 0 12/06/22 0938   Dressing Changed New 12/06/22 0938   Patient Tolerance Tolerated well 12/06/22 0938   Dressing Status Clean;Dry; Intact 12/06/22 0938         Juanita CESARN, RN, [facility]

## 2022-12-06 NOTE — ASSESSMENT & PLAN NOTE
-Ongoing watery diarrhea  -Rectal tube remains in place  -Stool C diff PCR negative  -Stool studies negative  -cont cholestyramine  -standing imodium increased to QID on 12/5/22  -GI saw in consult, will ask them to see again today

## 2022-12-06 NOTE — PROGRESS NOTES
Gastroenterology Progress Note      PATIENT INFORMATION      Patient: Barrera Heck 58 y o  male   MRN: [id number]  PCP: Daniel Gage MD  Unit/Bed#: [facility] 114-07 Encounter: [id number]  Date Of Visit: 22  Current Length of Stay: 35 day(s)     ASSESSMENTS & PLAN    Patient is a 26-year-old male 26-year-old male with a past medical history of CVA, antiphospholipid antibody syndrome on Coumadin, cognitive impairment who has been admitted for shortness of breath and anxiety and we are following for diarrhea     Diarrhea  · With brown liquid stools in rectal bag  · Differential diagnosis includes hyperthyroidism, microscopic colitis, inflammatory bowel disease, pancreatic insufficiency  · Stool enteric panel is negative  · Stool ova and parasites are negative  · C diff is negative  · Change diet to high fiber, lactose restriction  · Continue imodium 2mg QID  · Continue cholestyramine  · Obtain fecal calprotectin, elastase, fecal fat, celiac panel  · Discontinue PPI  · If symptoms do not improve or all stool studies negative then plan for colonoscopy on Thursday  · Hold coumadin empirically     Disposition: We will follow along       SUBJECTIVE     Patient with continued liquid stools through rectal tube  Patient denies abdominal pain, nausea, vomiting, heartburn, dysphagia, bleeding  OBJECTIVE     Vitals:   Temp (24hrs), Av 4 °F (36 3 °C), Min:97 °F (36 1 °C), Max:97 6 °F (36 4 °C)    Temp:  [97 °F (36 1 °C)-97 6 °F (36 4 °C)] 97 °F (36 1 °C)  HR:  [89-90] 90  Resp:  [16-18] 16  BP: (111-137)/(73-85) 137/85  Body mass index is 27 12 kg/m²  Input and Output Summary (last 24 hours):        Intake/Output Summary (Last 24 hours) at 2022 1258  Last data filed at 2022 0801  Gross per 24 hour   Intake 660 ml   Output 450 ml   Net 210 ml       Physical Exam:   GENERAL: Non toxic appearing  HEENT:  Normocephalic, atraumatic, pupils bilaterally reactive to light  CARDIAC:  Regular rate and rhythm, S1, S2 heard, no murmurs  PULMONARY:  CTA B/L, no wheezing/rales/rhonci, non-labored breathing  ABDOMEN:  Soft, NT/ND, +BS, no rebound/guarding/rigidity, rectal tube output noted  Extremities:  2+ Pulses in DP/PT  No edema, cyanosis, or clubbing  NEUROLOGIC:  Alert/oriented x3  SKIN:  No rashes or erythema          ADDITIONAL DATA     Labs & Recent Cultures:     Results from last 7 days   Lab Units 12/02/22  0445   WBC Thousand/uL 6 70   HEMOGLOBIN g/dL 10 7*   HEMATOCRIT % 33 6*   PLATELETS Thousands/uL 175     Results from last 7 days   Lab Units 12/02/22  0445   POTASSIUM mmol/L 4 1   CHLORIDE mmol/L 111*   CO2 mmol/L 24   BUN mg/dL 9   CREATININE mg/dL 0 53*   CALCIUM mg/dL 8 9     Results from last 7 days   Lab Units 12/06/22  0555   INR  2 66*                 Nutrition:  Diet GI; Hi Fiber; Lactose Restricted  Radiology Results:   CT head wo contrast   Final Result by Lashae 6, DO (11/26 1438)      No acute intracranial abnormality  Chronic microangiopathic changes  Encephalomalacia right inferior occipital lobe likely sequela of prior infarct  Workstation performed: [id number]         XR chest portable   Final Result by [address], DO (11/23 1417)      Low lung volumes  No acute cardiopulmonary disease  Workstation performed: [id number]         CT abdomen pelvis w contrast   Final Result by Ramila Valenzuela MD (11/11 1822)      Mildly distended fluid and air filled colon without obstruction in keeping with a nonspecific diarrheal illness  Workstation performed: [id number]         XR abdomen 1 view kub   Final Result by Alejandro Evans MD (11/11 1409)      Dilated air-filled colon through the level of the rectosigmoid with pattern suggesting distal stricture or partial obstruction   CT recommended               Workstation performed: [id number]         CT head without contrast   Final Result by Evelyn Long MD (11/03 1930)      No acute intracranial abnormality  Encephalomalacia involving the right occipital lobe and bilateral cerebelli  Grossly stable appearance of periventricular hypoattenuation compared to MRI brain 8/29/2022, likely representing chronic microvascular    ischemic changes  The study was marked in [facility] for immediate notification  Workstation performed: [id number]         XR chest 1 view portable   ED Interpretation by Brody Calvo MD (11/03 1902)   Status post sternotomy  Loop recorder in place  No acute cardiopulmonary disease process on my interpretation  Final Result by Nori Chamorro MD (11/04 5019)      No acute cardiopulmonary disease                    Workstation performed: [id number]           Scheduled Medications:  busPIRone, 5 mg, BID  cholestyramine sugar free, 4 g, BID  vitamin B-12, 500 mcg, Daily  cyanocobalamin, 1,000 mcg, Q30 Days  escitalopram, 10 mg, Daily  fluticasone, 1 spray, Daily  folic acid, 1 mg, Daily  loperamide, 2 mg, 4x Daily  LORazepam, 0 5 mg, BID  metoprolol succinate, 100 mg, Daily  multivitamin stress formula, 1 tablet, Daily  potassium chloride, 40 mEq, BID  thiamine, 100 mg, Daily  warfarin, 10 mg, QPM        PRN MEDS:  metoprolol, 5 mg, Q6H PRN  sodium chloride, 1 spray, Q1H PRN        Last 24 Hours Medication List:   Current Facility-Administered Medications   Medication Dose Route Frequency Provider Last Rate   • busPIRone  5 mg Oral BID Marito Foote MD     • cholestyramine sugar free  4 g Oral BID Idania Acuña MD     • vitamin B-12  500 mcg Oral Daily Brandoit Kaylie, DO     • cyanocobalamin  1,000 mcg Intramuscular Q30 Days Brandoit Kaylie, DO     • escitalopram  10 mg Oral Daily Greer Salter MD     • fluticasone  1 spray Each Nare Daily Tana Lott, DO     • folic acid  1 mg Oral Daily Marito Foote MD     • loperamide  2 mg Oral 4x Daily Flaco Easton MD     • LORazepam  0 5 mg Oral BID José Miguel Timmons Thorndike, MD     • metoprolol  5 mg Intravenous Q6H PRN Davian Plaza MD     • metoprolol succinate  100 mg Oral Daily Ba Martínez MD     • multivitamin stress formula  1 tablet Oral Daily Hussein Magallanes MD     • potassium chloride  40 mEq Oral BID Ba Martínez MD     • sodium chloride  1 spray Each Nare Q1H PRN Ba Martínez MD     • thiamine  100 mg Oral Daily Hussein Magallanes MD     • warfarin  10 mg Oral QPM Gustabo Mcgee DO            Time Spent for Care: 30 mins spent in total   More than 50% of total time spent on counseling and coordination of care as described above  Current Length of Stay: 33 day(s)      Code Status: Level 3 - DNAR and DNI          ** Please Note: This note is constructed using a voice recognition dictation system   **

## 2022-12-06 NOTE — ASSESSMENT & PLAN NOTE
-Previous admission from the end of September until the end of October at [facility] in which he was hospitalized for over a month for severe anxiety and inability to care for self  -Per sister reportedly was able to ambulate prior to the hospitalization but after the hospitalization patient needing assistance with ambulation at home  -During hospitalization there patient developed hyponatremia during his course of stay and Zoloft that had been started was stopped due to hyponatremia  -Recently discharged from inpatient psychiatry with mirtazapine and aripiprazole  -Pt seen in consultation by Psychiatry, started on Buspar/Lexapro  -Reconsulted psychiatry for re-evaluation/possible IP psych admission for [facility] - virtual psychiatry on 11/18/22 recommended inpatient psychiatry admission, in-person evaluation with our on-site psychiatrist 11/21/22 did not feel this was necessary  Recommended increasing escitalopram, making lorazepam scheduled as patient does not ask for it but his anxiety might benefit from it, and continuing BusPar   Overall plan is to help patient improve his anxiety so that he can get to rehab   -Per latest discussion w/ psychiatry -> continue current Lexapro/Buspar/Ativan regimen -> okay for discharge to skilled rehab from a  awaiting placement - case management following

## 2022-12-06 NOTE — PHYSICAL THERAPY NOTE
PHYSICAL THERAPY NOTE          Patient Name: Sumeet Sagastume  ESFUU'X Date: 12/6/2022 12/06/22 1435   PT Last Visit   PT Visit Date 12/06/22   Note Type   Note Type Treatment   Pain Assessment   Pain Assessment Tool 0-10   Pain Score No Pain   Restrictions/Precautions   Weight Bearing Precautions Per Order No   Other Precautions Cognitive; Chair Alarm; Bed Alarm;Multiple lines; Fall Risk  (rectal tube)   General   Chart Reviewed Yes   Response to Previous Treatment Patient with no complaints from previous session  Family/Caregiver Present No   Cognition   Overall Cognitive Status Impaired   Arousal/Participation Responsive; Cooperative   Attention Attends with cues to redirect   Orientation Level Oriented to person;Oriented to place   Memory Decreased short term memory   Following Commands Follows one step commands with increased time or repetition   Comments pt pleasant and cooperative to participate in therapy session   Bed Mobility   Supine to Sit Unable to assess   Sit to Supine Unable to assess   Additional Comments pt sitting OOB in recliner upon arrival  Pt returned to sitting OOB in recliner with all needs within reach + chair alarm on   Transfers   Sit to Stand 4  Minimal assistance   Additional items Assist x 1; Increased time required;Verbal cues   Stand to Sit 4  Minimal assistance   Additional items Assist x 1; Increased time required;Verbal cues   Additional Comments transfers with RW, cues for hand placement and sequencing   Ambulation/Elevation   Gait pattern Excessively slow; Short stride; Foward flexed   Gait Assistance 4  Minimal assist   Additional items Assist x 1;Verbal cues; Tactile cues   Assistive Device Rolling walker   Distance 60ft (seated rest) 40ft   Stair Management Assistance Not tested   Balance   Static Sitting Fair +   Dynamic Sitting Fair   Static Standing Fair -   Dynamic Standing Fair - Ambulatory Poor +   Endurance Deficit   Endurance Deficit Yes   Activity Tolerance   Activity Tolerance Patient tolerated treatment well   Nurse Made Aware RN cleared pt to participate in therapy session   Assessment   Prognosis Fair   Problem List Decreased strength;Decreased range of motion;Decreased endurance; Impaired balance;Decreased mobility; Decreased cognition; Impaired judgement;Decreased safety awareness;Pain   Assessment Pt seen for PT treatment session this date  Therapy session focused on functional transfers, gait training and endurance training in order to improve overall mobility and independence  Pt requires assist of 1 for all mobility performed this date  Pt with improvements in all functional mobility tasks this date, requires min A for all mobility tasks  Pt performed 2x STs to RW; cues for hand placement and sequencing  Pt ambulating increased distances this date with RW and min A; cues for widening TEE required  Seated rest break between trials required  Pt prefers to have chair follow for hallway ambulation  Pt making steady progress toward goals  Pt was left sitting OOB in recliner with all needs within reach at the end of PT session with all needs in reach  Pt would benefit from continued PT services while in hospital to address remaining limitations  PT to continue to follow pt and recommends STR  The patient's AM-PAC Basic Mobility Inpatient Short Form Raw Score is 17  A Raw score of greater than 16 suggests the patient may benefit from discharge to home  Please also refer to the recommendation of the Physical Therapist for safe discharge planning  Barriers to Discharge Inaccessible home environment;Decreased caregiver support   Goals   Patient Goals to get the rectal tube out   STG Expiration Date 12/09/22   PT Treatment Day 13   Plan   Treatment/Interventions Functional transfer training;LE strengthening/ROM; Endurance training;Patient/family training;Equipment eval/education;Gait training;Spoke to nursing;Spoke to case management   Progress Progressing toward goals   PT Frequency 2-3x/wk   Recommendation   PT Discharge Recommendation Post acute rehabilitation services   Equipment Recommended [facility] Recommended Wheeled walker   AM-PAC Basic Mobility Inpatient   Turning in Bed Without Bedrails 3   Lying on Back to Sitting on Edge of Flat Bed 3   Moving Bed to Chair 3   Standing Up From Chair 3   Walk in Room 3   Climb 3-5 Stairs 2   Basic Mobility Inpatient Raw Score 17   Basic Mobility Standardized Score 39 67   Highest Level Of Mobility   -HLM Goal 5: Stand one or more mins   -HLM Achieved 7: Walk 25 feet or more   Education   Education Provided Mobility training;Assistive device   Patient Demonstrates acceptance/verbal understanding   End of Consult   Patient Position at End of Consult Bedside chair;Bed/Chair alarm activated; All needs within reach     Natanael Mchugh, PT, DPT

## 2022-12-06 NOTE — ASSESSMENT & PLAN NOTE
-Per chart review patient has had low TSH and elevated free T4 in the past   -Most recent labs during this admission with TSH 0 282 and free t4 1 56  -Thyroid U/S during last admission at [facility] was unremarkable   -Evaluated by endocrinology in the past during previous admission at [facility] (Oct 22) and they recommend get repeat TFT, TSI and thyroid abs   -Endocrinology input noted patient likely subclinical hypothyroidism  -Outpatient endocrinology follow-up recommended

## 2022-12-06 NOTE — RESTORATIVE TECHNICIAN NOTE
Restorative Technician Note      Patient Name: Chriss Nelson     Restorative Tech Visit Date: 12/06/22  Note Type: Mobility  Patient Position Upon Consult: Supine  Activity Performed: Ambulated  Assistive Device: Roller walker  Patient Position at End of Consult: Bedside chair;  All needs within reach; Bed/Chair alarm activated      Radha Snow

## 2022-12-06 NOTE — PROGRESS NOTES
Patient stated his knees were weak and had an unintentional fall  SLIM notified and rapid called  CT head and neck ordered  C-spine in place  Rapid called

## 2022-12-06 NOTE — UTILIZATION REVIEW
Continued Stay Review    Date: 12/6/2022                         Current Patient Class: inpatient  Current Level of Care: med/surg    HPI:62 y o  male initially admitted on 11/3 with generalized anxiety d/o     Assessment/Plan:  Pt with no new complaints  Standing imodium increased to QID yesterday  Continue current Lexapro/Buspar/Ativan regimen  Plan to d/c to skilled rehab from when medically stable  Case management following  Continue supportive care      Vital Signs:   Date/Time Temp Pulse Resp BP MAP (mmHg) O2 Device Patient Position - Orthostatic VS   12/06/22 15:21:53 97 5 °F (36 4 °C) -- 18 119/90 100 -- --   12/06/22 07:56:54 97 °F (36 1 °C) Abnormal  90 16 137/85 128 -- --   12/05/22 2254 97 6 °F (36 4 °C) 90 18 112/78 -- -- --   12/05/22 15:40:18 97 5 °F (36 4 °C) 89 16 111/73 86 -- --   12/05/22 07:26:20 97 5 °F (36 4 °C) 90 18 146/87 107 -- --   12/04/22 22:09:44 97 5 °F (36 4 °C) -- 15 144/84 104 -- Lying   12/04/22 14:38:32 97 3 °F (36 3 °C) Abnormal  -- 17 136/85 102 -- --   12/04/22 0900 -- -- -- -- -- None (Room air) --   12/04/22 08:29:34 97 2 °F (36 2 °C) Abnormal  -- 17 163/94 117 -- --       Pertinent Labs/Diagnostic Results:     Results from last 7 days   Lab Units 12/02/22  0445 12/01/22  1441 11/30/22  0521   WBC Thousand/uL 6 70 7 05 7 75   HEMOGLOBIN g/dL 10 7* 11 2* 11 4*   HEMATOCRIT % 33 6* 35 7* 34 0*   PLATELETS Thousands/uL 175 167 164     Results from last 7 days   Lab Units 12/02/22  0445 12/01/22  0659 11/30/22  0521   SODIUM mmol/L 140 138 138   POTASSIUM mmol/L 4 1 3 9 3 8   CHLORIDE mmol/L 111* 113* 110*   CO2 mmol/L 24 17* 23   ANION GAP mmol/L 5 8 5   BUN mg/dL 9 9 6   CREATININE mg/dL 0 53* 0 53* 0 44*   EGFR ml/min/1 73sq m 113 113 122   CALCIUM mg/dL 8 9 8 8 8 5   MAGNESIUM mg/dL 2 0 2 3 2 0   PHOSPHORUS mg/dL 3 0 2 6 2 3     Results from last 7 days   Lab Units 12/02/22  0445 12/01/22  0659 11/30/22  0521   ALBUMIN g/dL 2 0* 2 1* 2 4*     Results from last 7 days   Lab Units 12/02/22  0445 12/01/22  0659 11/30/22  0521   GLUCOSE RANDOM mg/dL 104 102 102     Results from last 7 days   Lab Units 12/06/22  0555 12/05/22  0444 12/04/22  0445   PROTIME seconds 28 6* 25 7* 25 4*   INR  2 66* 2 32* 2 28*     Results from last 7 days   Lab Units 12/06/22  1459   CRP mg/L 18 6*     Medications:   Scheduled Medications:  busPIRone, 5 mg, Oral, BID  cholestyramine sugar free, 4 g, Oral, BID  vitamin B-12, 500 mcg, Oral, Daily  cyanocobalamin, 1,000 mcg, Intramuscular, Q30 Days  escitalopram, 10 mg, Oral, Daily  fluticasone, 1 spray, Each Nare, Daily  folic acid, 1 mg, Oral, Daily  loperamide, 2 mg, Oral, 4x Daily  LORazepam, 0 5 mg, Oral, BID  metoprolol succinate, 100 mg, Oral, Daily  multivitamin stress formula, 1 tablet, Oral, Daily  potassium chloride, 40 mEq, Oral, BID  thiamine, 100 mg, Oral, Daily    PRN Meds:  metoprolol, 5 mg, Intravenous, Q6H PRN  sodium chloride, 1 spray, Each Nare, Q1H PRN        Discharge Plan: D    Network Utilization Review Department  ATTENTION: Please call with any questions or concerns to [phone] and carefully listen to the prompts so that you are directed to the right person  All voicemails are confidential   [facility] all requests for admission clinical reviews, approved or denied determinations and any other requests to dedicated fax number below belonging to the campus where the patient is receiving treatment   List of dedicated fax numbers for the Facilities:  [address] DENIALS (Administrative/Medical Necessity) [phone]   [address] (Maternity/NICU/Pediatrics) [phone]   [address] [phone]   [facility] [phone]   [place] [phone]   [address] [place] [address] [place]  41  [phone]   [address] [place] [address] [phone]   [address] [phone]

## 2022-12-07 LAB
ANION GAP SERPL CALCULATED.3IONS-SCNC: 8 MMOL/L (ref 4–13)
BUN SERPL-MCNC: 12 MG/DL (ref 5–25)
CALCIUM SERPL-MCNC: 9 MG/DL (ref 8.3–10.1)
CHLORIDE SERPL-SCNC: 106 MMOL/L (ref 96–108)
CO2 SERPL-SCNC: 23 MMOL/L (ref 21–32)
CREAT SERPL-MCNC: 0.75 MG/DL (ref 0.6–1.3)
ENDOMYSIUM IGA SER QL: NEGATIVE
ERYTHROCYTE [DISTWIDTH] IN BLOOD BY AUTOMATED COUNT: 15.7 % (ref 11.6–15.1)
GFR SERPL CREATININE-BSD FRML MDRD: 98 ML/MIN/1.73SQ M
GLIADIN PEPTIDE IGA SER-ACNC: 9 UNITS (ref 0–19)
GLIADIN PEPTIDE IGG SER-ACNC: 3 UNITS (ref 0–19)
GLUCOSE SERPL-MCNC: 111 MG/DL (ref 65–140)
HCT VFR BLD AUTO: 36 % (ref 36.5–49.3)
HGB BLD-MCNC: 12.1 G/DL (ref 12–17)
IGA SERPL-MCNC: 290 MG/DL (ref 61–437)
INR PPP: 2.67 (ref 0.84–1.19)
MCH RBC QN AUTO: 32.4 PG (ref 26.8–34.3)
MCHC RBC AUTO-ENTMCNC: 33.6 G/DL (ref 31.4–37.4)
MCV RBC AUTO: 96 FL (ref 82–98)
PLATELET # BLD AUTO: 189 THOUSANDS/UL (ref 149–390)
PMV BLD AUTO: 9.2 FL (ref 8.9–12.7)
POTASSIUM SERPL-SCNC: 4 MMOL/L (ref 3.5–5.3)
PROTHROMBIN TIME: 28.7 SECONDS (ref 11.6–14.5)
RBC # BLD AUTO: 3.74 MILLION/UL (ref 3.88–5.62)
SODIUM SERPL-SCNC: 137 MMOL/L (ref 135–147)
TTG IGA SER-ACNC: <2 U/ML (ref 0–3)
TTG IGG SER-ACNC: <2 U/ML (ref 0–5)
WBC # BLD AUTO: 9.1 THOUSAND/UL (ref 4.31–10.16)

## 2022-12-07 RX ORDER — POLYETHYLENE GLYCOL 3350 17 G/17G
238 POWDER, FOR SOLUTION ORAL ONCE
Status: COMPLETED | OUTPATIENT
Start: 2022-12-07 | End: 2022-12-07

## 2022-12-07 RX ADMIN — FLUTICASONE PROPIONATE 1 SPRAY: 50 SPRAY, METERED NASAL at 08:47

## 2022-12-07 RX ADMIN — LOPERAMIDE HYDROCHLORIDE 2 MG: 2 CAPSULE ORAL at 11:58

## 2022-12-07 RX ADMIN — METOPROLOL SUCCINATE 100 MG: 100 TABLET, EXTENDED RELEASE ORAL at 08:46

## 2022-12-07 RX ADMIN — LOPERAMIDE HYDROCHLORIDE 2 MG: 2 CAPSULE ORAL at 08:46

## 2022-12-07 RX ADMIN — CHOLESTYRAMINE 4 G: 4 POWDER, FOR SUSPENSION ORAL at 08:47

## 2022-12-07 RX ADMIN — POTASSIUM CHLORIDE 40 MEQ: 1500 TABLET, EXTENDED RELEASE ORAL at 08:46

## 2022-12-07 RX ADMIN — LORAZEPAM 0.5 MG: 0.5 TABLET ORAL at 08:46

## 2022-12-07 RX ADMIN — B-COMPLEX W/ C & FOLIC ACID TAB 1 TABLET: TAB at 08:46

## 2022-12-07 RX ADMIN — CHOLESTYRAMINE 4 G: 4 POWDER, FOR SUSPENSION ORAL at 17:28

## 2022-12-07 RX ADMIN — THIAMINE HCL TAB 100 MG 100 MG: 100 TAB at 08:46

## 2022-12-07 RX ADMIN — LOPERAMIDE HYDROCHLORIDE 2 MG: 2 CAPSULE ORAL at 17:34

## 2022-12-07 RX ADMIN — BUSPIRONE HYDROCHLORIDE 5 MG: 5 TABLET ORAL at 17:34

## 2022-12-07 RX ADMIN — ESCITALOPRAM OXALATE 10 MG: 10 TABLET ORAL at 08:46

## 2022-12-07 RX ADMIN — POTASSIUM CHLORIDE 40 MEQ: 1500 TABLET, EXTENDED RELEASE ORAL at 17:34

## 2022-12-07 RX ADMIN — POLYETHYLENE GLYCOL 3350 238 G: 17 POWDER, FOR SOLUTION ORAL at 17:28

## 2022-12-07 RX ADMIN — LORAZEPAM 0.5 MG: 0.5 TABLET ORAL at 17:34

## 2022-12-07 RX ADMIN — FOLIC ACID 1 MG: 1 TABLET ORAL at 08:46

## 2022-12-07 RX ADMIN — BUSPIRONE HYDROCHLORIDE 5 MG: 5 TABLET ORAL at 08:46

## 2022-12-07 RX ADMIN — CYANOCOBALAMIN TAB 500 MCG 500 MCG: 500 TAB at 08:46

## 2022-12-07 RX ADMIN — BISACODYL 20 MG: 5 TABLET, COATED ORAL at 17:33

## 2022-12-07 NOTE — ASSESSMENT & PLAN NOTE
-Per chart review patient has had low TSH and elevated free T4 in the past   -Most recent labs during this admission with TSH 0 282 and free t4 1 56  -Thyroid U/S during last admission at [address] [place] [address] was unremarkable   -Evaluated by endocrinology in the past during previous admission at [address] [place] [address] (Oct 22) and they recommend get repeat TFT, TSI and thyroid abs   -Endocrinology input noted patient likely subclinical hypothyroidism  -Outpatient endocrinology follow-up recommended

## 2022-12-07 NOTE — RESTORATIVE TECHNICIAN NOTE
Restorative Technician Note      Patient Name: Gayle Roman     Restorative Tech Visit Date: 12/07/22  Note Type: Mobility  Patient Position Upon Consult: Supine  Activity Performed: Transferred  Assistive Device: Other (Comment) (BHHAx2 stand-pivot into chair)  Patient Position at End of Consult: Bedside chair;  All needs within reach; Bed/Chair alarm activated    Domitila Rodriguez

## 2022-12-07 NOTE — PROGRESS NOTES
Gastroenterology Progress Note      PATIENT INFORMATION      Patient: Sumeet Sagastume 58 y o  male   MRN: [id number]  PCP: Vincent Pino MD  Unit/Bed#: [facility] 777-46 Encounter: [id number]  Date Of Visit: 22  Current Length of Stay: 29 day(s)     ASSESSMENTS & PLAN    Patient is a 77-year-old male 77-year-old male with a past medical history of CVA, antiphospholipid antibody syndrome on Coumadin, cognitive impairment who has been admitted for shortness of breath and anxiety and we are following for diarrhea     Diarrhea  • With brown liquid stools in rectal bag  • Differential diagnosis includes hyperthyroidism, microscopic colitis, inflammatory bowel disease, pancreatic insufficiency  • Stool enteric panel is negative  • Stool ova and parasites are negative  • C diff is negative  • Continue imodium 2mg QID  • Continue cholestyramine  • Fecal calprotectin, elastase, fecal fat, celiac panel pending  • Discontinue PPI  • Plan for EGD/Colonoscopy tomorrow  • Clear liquid diet, NPO at midnight   • Hold coumadin         SUBJECTIVE     Patient with continued diarrhea  Patient denies abdominal pain, nausea, vomiting, heartburn, dysphagia  OBJECTIVE     Vitals:   Temp (24hrs), Av 4 °F (36 3 °C), Min:97 2 °F (36 2 °C), Max:97 7 °F (36 5 °C)    Temp:  [97 2 °F (36 2 °C)-97 7 °F (36 5 °C)] 97 3 °F (36 3 °C)  HR:  [91-92] 91  Resp:  [18-20] 18  BP: ()/(66-90) 146/89  SpO2:  [98 %] 98 %  Body mass index is 27 12 kg/m²  Input and Output Summary (last 24 hours):        Intake/Output Summary (Last 24 hours) at 2022 1504  Last data filed at 2022 1243  Gross per 24 hour   Intake 1200 ml   Output 1100 ml   Net 100 ml       Physical Exam:   GENERAL: Non toxic appearing  HEENT:  Normocephalic, atraumatic, pupils bilaterally reactive to light  CARDIAC:  Regular rate and rhythm, S1, S2 heard, no murmurs  PULMONARY:  CTA B/L, no wheezing/rales/rhonci, non-labored breathing  ABDOMEN:  Soft, NT/ND, +BS, no rebound/guarding/rigidity  Extremities:  2+ Pulses in DP/PT  No edema, cyanosis, or clubbing  NEUROLOGIC:  Alert/oriented x3  SKIN:  No rashes or erythema          ADDITIONAL DATA     Labs & Recent Cultures:     Results from last 7 days   Lab Units 12/07/22  1353   WBC Thousand/uL 9 10   HEMOGLOBIN g/dL 12 1   HEMATOCRIT % 36 0*   PLATELETS Thousands/uL 189     Results from last 7 days   Lab Units 12/07/22  1353   POTASSIUM mmol/L 4 0   CHLORIDE mmol/L 106   CO2 mmol/L 23   BUN mg/dL 12   CREATININE mg/dL 0 75   CALCIUM mg/dL 9 0     Results from last 7 days   Lab Units 12/07/22  0506   INR  2 67*                 Nutrition:  Diet Clear Liquid  Diet NPO; Sips with meds  Radiology Results:   CT head wo contrast   Final Result by Parish Clemons DO (12/06 2015)   No acute intracranial abnormality  Workstation performed: [id number]         CT spine cervical wo contrast   Final Result by Parish Clemons DO (12/06 2029)   No cervical spine fracture or traumatic malalignment  Workstation performed: [id number]         CT head wo contrast   Final Result by Lashae 6, DO (11/26 1438)      No acute intracranial abnormality  Chronic microangiopathic changes  Encephalomalacia right inferior occipital lobe likely sequela of prior infarct  Workstation performed: [id number]         XR chest portable   Final Result by [address], DO (11/23 1417)      Low lung volumes  No acute cardiopulmonary disease  Workstation performed: [id number]         CT abdomen pelvis w contrast   Final Result by Esther Davey MD (11/11 1822)      Mildly distended fluid and air filled colon without obstruction in keeping with a nonspecific diarrheal illness              Workstation performed: [id number]         XR abdomen 1 view kub   Final Result by Vickie Anthony MD (11/11 7589)      Dilated air-filled colon through the level of the rectosigmoid with pattern suggesting distal stricture or partial obstruction  CT recommended               Workstation performed: [id number]         CT head without contrast   Final Result by Mara Rodriguez MD (11/03 1930)      No acute intracranial abnormality  Encephalomalacia involving the right occipital lobe and bilateral cerebelli  Grossly stable appearance of periventricular hypoattenuation compared to MRI brain 8/29/2022, likely representing chronic microvascular    ischemic changes  The study was marked in [facility] for immediate notification  Workstation performed: [id number]         XR chest 1 view portable   ED Interpretation by Nelda Davis MD (11/03 1902)   Status post sternotomy  Loop recorder in place  No acute cardiopulmonary disease process on my interpretation  Final Result by Becki Reyes MD (11/04 1672)      No acute cardiopulmonary disease                    Workstation performed: [id number]           Scheduled Medications:  bisacodyl, 20 mg, Once  [START ON 12/8/2022] bisacodyl, 20 mg, Once  busPIRone, 5 mg, BID  cholestyramine sugar free, 4 g, BID  vitamin B-12, 500 mcg, Daily  cyanocobalamin, 1,000 mcg, Q30 Days  escitalopram, 10 mg, Daily  fluticasone, 1 spray, Daily  folic acid, 1 mg, Daily  loperamide, 2 mg, 4x Daily  LORazepam, 0 5 mg, BID  metoprolol succinate, 100 mg, Daily  multivitamin stress formula, 1 tablet, Daily  polyethylene glycol, 238 g, Once  potassium chloride, 40 mEq, BID  thiamine, 100 mg, Daily        PRN MEDS:  metoprolol, 5 mg, Q6H PRN  sodium chloride, 1 spray, Q1H PRN        Last 24 Hours Medication List:   Current Facility-Administered Medications   Medication Dose Route Frequency Provider Last Rate   • bisacodyl  20 mg Oral Once Jena Parkinson MD     • [START ON 12/8/2022] bisacodyl  20 mg Oral Once Jena Parkinson MD     • busPIRone  5 mg Oral BID Julio Morocho MD     • cholestyramine sugar free  4 g Oral BID Marjorie Nba Guevara MD     • vitamin B-12  500 mcg Oral Daily Jasmit Kaylie, DO     • cyanocobalamin  1,000 mcg Intramuscular Q30 Days Jasmit Kaylie, DO     • escitalopram  10 mg Oral Daily Gaurang Salter MD     • fluticasone  1 spray Each Nare Daily Jasmit Kaylie, DO     • folic acid  1 mg Oral Daily Judge Humberto MD     • loperamide  2 mg Oral 4x Daily Flaco Easton MD     • LORazepam  0 5 mg Oral BID Clemetine Closs, MD     • metoprolol  5 mg Intravenous Q6H PRN Carina Gallegos MD     • metoprolol succinate  100 mg Oral Daily Clemetine Closs, MD     • multivitamin stress formula  1 tablet Oral Daily Judge Humberto MD     • polyethylene glycol  238 g Oral Once Ru Wilson MD     • potassium chloride  40 mEq Oral BID Clemetine Closs, MD     • sodium chloride  1 spray Each Nare Q1H PRN Clemetine Closs, MD     • thiamine  100 mg Oral Daily Judge Humberto MD            Time Spent for Care: 30 mins spent in total   More than 50% of total time spent on counseling and coordination of care as described above  Current Length of Stay: 34 day(s)      Code Status: Level 3 - DNAR and DNI          ** Please Note: This note is constructed using a voice recognition dictation system   **

## 2022-12-07 NOTE — PLAN OF CARE
Problem: Potential for Falls  Goal: Patient will remain free of falls  Description: INTERVENTIONS:  - Educate patient/family on patient safety including physical limitations  - Instruct patient to call for assistance with activity   - Consult OT/PT to assist with strengthening/mobility   - Keep Call bell within reach  - Keep bed low and locked with side rails adjusted as appropriate  - Keep care items and personal belongings within reach  - Initiate and maintain comfort rounds  - Make Fall Risk Sign visible to staff  - Offer Toileting every 2 Hours, in advance of need  - Initiate/Maintain bedalarm  - Obtain necessary fall risk management equipment:   - Apply yellow socks and bracelet for high fall risk patients  - Consider moving patient to room near nurses station  Outcome: Progressing

## 2022-12-07 NOTE — PROGRESS NOTES
Tried to prop pt this am with pillow for skin integrity, but this seemed to really agitate him, he continues to call with concerns over colonoscopy, we have discussed the plan based on the notes in chart a few times, he sais the pillow was making his hands go numb, so have removed that and he is just laying flat in the p500 bed, he tolerated a whole bed change for incontinence of urine well, offers no c/o pain at present, will continue to monitor

## 2022-12-07 NOTE — ASSESSMENT & PLAN NOTE
-Previous admission from the end of September until the end of October at [address] [place] [address] in which he was hospitalized for over a month for severe anxiety and inability to care for self  -Per sister reportedly was able to ambulate prior to the hospitalization but after the hospitalization patient needing assistance with ambulation at home  -During hospitalization there patient developed hyponatremia during his course of stay and Zoloft that had been started was stopped due to hyponatremia  -Recently discharged from inpatient psychiatry with mirtazapine and aripiprazole  -Pt seen in consultation by Psychiatry, started on Buspar/Lexapro  -Reconsulted psychiatry for re-evaluation/possible IP psych admission for [address] [place] [address] - virtual psychiatry on 11/18/22 recommended inpatient psychiatry admission, in-person evaluation with our on-site psychiatrist 11/21/22 did not feel this was necessary  Recommended increasing escitalopram, making lorazepam scheduled as patient does not ask for it but his anxiety might benefit from it, and continuing BusPar   Overall plan is to help patient improve his anxiety so that he can get to rehab   -as per psychiatry, continue current Lexapro/Buspar/Ativan regimen

## 2022-12-07 NOTE — ASSESSMENT & PLAN NOTE
-CT head in ER: No acute intracranial abnormality   Encephalomalacia involving the right occipital lobe and bilateral cerebelli   Grossly stable appearance of periventricular hypoattenuation compared to MRI brain 8/29/2022, likely representing chronic microvascular ischemic changes    -H/o CVA x 3 in setting of antiphospholipid syndrome  -cont Coumadin for anticoagulation  -Awaiting placement to SNF for rehabilitation (after rectal tube can be removed)

## 2022-12-07 NOTE — CASE MANAGEMENT
Case Management Discharge Planning Note    Patient name Gayle [facility]  Location [address] [place] [address] [facility] [id number]  : 1960 Date 2022       Current Admission Date: 11/3/2022  Current Admission Diagnosis:SHIMON (generalized anxiety disorder)   Patient Active Problem List    Diagnosis Date Noted   • Diarrhea 2022   • Cognitive impairment 2022   • Hyperthyroidism 11/10/2022   • Severe protein-calorie malnutrition  2022   • Hyponatremia 10/14/2022   • SIADH (syndrome of inappropriate ADH production) ([facility] ) 10/14/2022   • Subclinical hyperthyroidism 10/14/2022   • Sinus arrhythmia 10/10/2022   • Mild protein-calorie malnutrition ([place] [address] ) 10/07/2022   • Medical clearance for incarceration 2022   • 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 attack 2022   • Atypical chest pain 2022   • Multiple electrolyte abnormalities 2022   • Dizziness 2022   • Renal cyst 2022   • PVD (peripheral vascular disease) ([place] [address] ) 2022   • Ataxia 2022   • History of CVA (cerebrovascular accident) 2022   • Right inguinal pain 2022   • Alkaline phosphatase elevation 2022   • Low back pain 2022   • S/P laparoscopic hernia repair 2021   • Non-recurrent bilateral inguinal hernia without obstruction or gangrene 2021   • Gastric polyps 2021   • Gastric AVM 2021   • Edema of both lower legs 2021   • Tremor of right hand 2021   • Weakness of both lower extremities 2021   • Anticoagulated on Coumadin 2021   • CORIE (obstructive sleep apnea)    • Hyperbilirubinemia 08/10/2020   • Antiphospholipid antibody with hypercoagulable state  2020   • History of stroke 2020   • Other viral warts 2019   • Physical deconditioning 2019   • Class 2 obesity in adult 2018   • Bright red blood per rectum 11/10/2017   • Numbness 2017   • H/O aortic aneurysm repair 2017   • Hypertension 08/26/2017   • GERD (gastroesophageal reflux disease) 08/26/2017   • Hyperlipidemia 03/17/2017   • Peyronie disease 12/09/2016   • Vitamin D deficiency 06/15/2016   • Atrial fibrillation 11/17/2014   • SHIMON (generalized anxiety disorder) 11/11/2014   • Constipation 09/19/2014   • Irritable bowel syndrome 04/24/2014   • Kidney stones 04/24/2014      LOS (days): 34  Geometric Mean LOS (GMLOS) (days): 5 00  Days to GMLOS:-28 8     OBJECTIVE:  Risk of Unplanned Readmission Score: 33 24         Current admission status: Inpatient   Preferred Pharmacy:   [facility] [address] [facility], [address]  Phone: [phone] Fax: [phone]    Primary Care Provider: Todd Weber MD    Primary Insurance: [facility] [address]  Secondary Insurance:     DISCHARGE DETAILS:       Additional Comments: Patient is not medically cleared for d/c today  Patient is currently NPO with plans for EGD/Colonscopy tomorrow  Patient continues with rectal tube at this time

## 2022-12-07 NOTE — ASSESSMENT & PLAN NOTE
-Continue Coumadin (discuss restarting coumadin after colonoscopy with GI), Goal INR 2-3  -had ischemic stroke in the past despite Eliquis, hence, deemed Eliquis failure

## 2022-12-07 NOTE — ASSESSMENT & PLAN NOTE
-Previous admission from the end of September until the end of October at [address] [place] [address] in which he was hospitalized for over a month for severe anxiety and inability to care for self  -Per sister reportedly was able to ambulate prior to the hospitalization but after the hospitalization patient needing assistance with ambulation at home  -During hospitalization there patient developed hyponatremia during his course of stay and Zoloft that had been started was stopped due to hyponatremia  -Recently discharged from inpatient psychiatry with mirtazapine and aripiprazole  -Pt seen in consultation by Psychiatry, started on Buspar/Lexapro  -Reconsulted psychiatry for re-evaluation/possible IP psych admission for [facility] - virtual psychiatry on 11/18/22 recommended inpatient psychiatry admission, in-person evaluation with our on-site psychiatrist 11/21/22 did not feel this was necessary  Recommended increasing escitalopram, making lorazepam scheduled as patient does not ask for it but his anxiety might benefit from it, and continuing BusPar   Overall plan is to help patient improve his anxiety so that he can get to rehab   -Per latest discussion w/ psychiatry -> continue current Lexapro/Buspar/Ativan regimen -> okay for discharge to skilled rehab from a  awaiting placement - case management following

## 2022-12-07 NOTE — ASSESSMENT & PLAN NOTE
-Per chart review patient has had low TSH and elevated free T4 in the past   -Most recent labs during this admission with TSH 0 282 and free t4 1 56  -Thyroid U/S during last admission at [facility] was unremarkable   -Evaluated by endocrinology in the past during previous admission at [facility] (Oct 22) and they recommend get repeat TFT, TSI and thyroid abs   -Endocrinology input noted patient likely subclinical hypothyroidism  -Outpatient endocrinology follow-up recommended

## 2022-12-07 NOTE — PROGRESS NOTES
[facility]  Progress Note - Rohan Ortiz 1960, 58 y o  male MRN: [id number]  Unit/Bed#: PPHP 907-01 Encounter: [id number]  Primary Care Provider: Jeffrey Egan MD   Date and time admitted to hospital: 11/3/2022  3:28 PM    * SHIMON (generalized anxiety disorder)  Assessment & Plan  -Previous admission from the end of September until the end of October at [facility] in which he was hospitalized for over a month for severe anxiety and inability to care for self  -Per sister reportedly was able to ambulate prior to the hospitalization but after the hospitalization patient needing assistance with ambulation at home  -During hospitalization there patient developed hyponatremia during his course of stay and Zoloft that had been started was stopped due to hyponatremia  -Recently discharged from inpatient psychiatry with mirtazapine and aripiprazole  -Pt seen in consultation by Psychiatry, started on Buspar/Lexapro  -Reconsulted psychiatry for re-evaluation/possible IP psych admission for [facility] - virtual psychiatry on 11/18/22 recommended inpatient psychiatry admission, in-person evaluation with our on-site psychiatrist 11/21/22 did not feel this was necessary  Recommended increasing escitalopram, making lorazepam scheduled as patient does not ask for it but his anxiety might benefit from it, and continuing BusPar   Overall plan is to help patient improve his anxiety so that he can get to rehab   -Per latest discussion w/ psychiatry -> continue current Lexapro/Buspar/Ativan regimen -> okay for discharge to skilled rehab from a  awaiting placement - case management following    Diarrhea  Assessment & Plan  -Ongoing watery diarrhea  -Rectal tube remains in place  -Stool C diff PCR negative  -Stool studies negative  -cont cholestyramine  -standing imodium increased to QID on 12/5/22  -colonoscopy tomorrow      Cognitive impairment  Assessment & Plan  -Per neuropsychology evaluation on 11/14/22, patient does NOT have capacity to make medical decisions   -Patient also has some level of cognitive impairment   -B12 borderline low, continue supplementation      Hyperthyroidism  Assessment & Plan  -Per chart review patient has had low TSH and elevated free T4 in the past   -Most recent labs during this admission with TSH 0 282 and free t4 1 56  -Thyroid U/S during last admission at [facility] was unremarkable   -Evaluated by endocrinology in the past during previous admission at [facility] (Oct 22) and they recommend get repeat TFT, TSI and thyroid abs   -Endocrinology input noted patient likely subclinical hypothyroidism  -Outpatient endocrinology follow-up recommended    Severe protein-calorie malnutrition   Assessment & Plan  -Encourage adequate protein and calorie intake    Multiple electrolyte abnormalities  Assessment & Plan  -currently K and Mg normal  -cont PO K    Tremor of right hand  Assessment & Plan  -will need outpatient follow-up w/ neurology     History of CVA (cerebrovascular accident)  Assessment & Plan  -CT head in ER: No acute intracranial abnormality   Encephalomalacia involving the right occipital lobe and bilateral cerebelli   Grossly stable appearance of periventricular hypoattenuation compared to MRI brain 8/29/2022, likely representing chronic microvascular ischemic changes  -H/o CVA x 3 in setting of antiphospholipid syndrome  -cont Coumadin for anticoagulation  -Awaiting placement to SNF for rehabilitation (after rectal tube can be removed)    Antiphospholipid antibody with hypercoagulable state   Assessment & Plan  -Continue Coumadin, Goal INR 2-3  -had ischemic stroke in the past despite Eliquis, hence, deemed Eliquis failure      Atrial fibrillation  Assessment & Plan  -cont Toprol-XL/coumadin   -cont to trend INR        VTE Pharmacologic Prophylaxis: VTE Score: 3 coumadin    Patient Centered Rounds: I performed bedside rounds with nursing staff today      Time Spent for Care: 20 minutes  More than 50% of total time spent on counseling and coordination of care as described above  Current Length of Stay: 34 day(s)  Current Patient Status: Inpatient   Certification Statement: The patient will continue to require additional inpatient hospital stay due to colonoscopy tomorrow  Discharge Plan: Anticipate discharge in 48-72 hrs to rehab facility  Code Status: Level 3 - DNAR and DNI    Subjective:   Pt denies CP/SOB  Objective:     Vitals:   Temp (24hrs), Av 5 °F (36 4 °C), Min:97 2 °F (36 2 °C), Max:97 7 °F (36 5 °C)    Temp:  [97 2 °F (36 2 °C)-97 7 °F (36 5 °C)] 97 7 °F (36 5 °C)  HR:  [91-92] 91  Resp:  [18-20] 18  BP: ()/(66-90) 140/89  SpO2:  [98 %] 98 %  Body mass index is 27 12 kg/m²  Input and Output Summary (last 24 hours):      Intake/Output Summary (Last 24 hours) at 2022 1308  Last data filed at 2022 1243  Gross per 24 hour   Intake 1200 ml   Output 1900 ml   Net -700 ml       Physical Exam:   Gen: continues to remain NAD, Awake and alert, well developed, well nourished  Eyes: EOMI, PERRLA, no scleral icterus  ENMT:  no nasal discharge, no otic discharge, moist mucous membranes  Neck:  Supple  Cardiovascular: remains Regular rate and rhythm, normal S1-S2, no murmurs, rubs, or gallops  Lungs:  Clear to auscultation bilaterally, no wheezes, or rales, or rhonchi  Abdomen: continues to remain Positive bowel sounds, soft, nontender, nondistended, no palpable organomegaly, rectal tube in place with loose brown stool   Skin:  Intact, no obvious lesions or rashes, no edema  Neuro: Cranial nerves 2-12 are intact, non-focal, moves all 4 extremities, resting tremor    Additional Data:     Labs:  Results from last 7 days   Lab Units 22  0445   WBC Thousand/uL 6 70   HEMOGLOBIN g/dL 10 7*   HEMATOCRIT % 33 6*   PLATELETS Thousands/uL 175     Results from last 7 days   Lab Units 22  0445   SODIUM mmol/L 140   POTASSIUM mmol/L 4 1   CHLORIDE mmol/L 111* CO2 mmol/L 24   BUN mg/dL 9   CREATININE mg/dL 0 53*   ANION GAP mmol/L 5   CALCIUM mg/dL 8 9   ALBUMIN g/dL 2 0*   GLUCOSE RANDOM mg/dL 104     Results from last 7 days   Lab Units 12/07/22  0506   INR  2 67*                   Lines/Drains:  Invasive Devices     Peripheral Intravenous Line  Duration           Peripheral IV 12/06/22 Distal;Upper;Ventral (anterior); Left Arm <1 day          Drain  Duration           Rectal Tube With balloon 13 days                Recent Cultures (last 7 days):         Last 24 Hours Medication List:   Current Facility-Administered Medications   Medication Dose Route Frequency Provider Last Rate   • busPIRone  5 mg Oral BID Gisell Castro MD     • cholestyramine sugar free  4 g Oral BID Mervat Swanson MD     • vitamin B-12  500 mcg Oral Daily Tana Lott DO     • cyanocobalamin  1,000 mcg Intramuscular Q30 Days Tana Lott DO     • escitalopram  10 mg Oral Daily Magdiel Salter MD     • fluticasone  1 spray Each Nare Daily Tana Lott DO     • folic acid  1 mg Oral Daily Gisell Castro MD     • loperamide  2 mg Oral 4x Daily Flaco Easton MD     • LORazepam  0 5 mg Oral BID Travis Ham MD     • metoprolol  5 mg Intravenous Q6H PRN Ivana Jorgensen MD     • metoprolol succinate  100 mg Oral Daily Travis Ham MD     • multivitamin stress formula  1 tablet Oral Daily Gisell Castro MD     • polyethylene glycol  238 g Oral Once Daysi Mayen MD     • potassium chloride  40 mEq Oral BID Travis Ham MD     • sodium chloride  1 spray Each Nare Q1H PRN Travis Ham MD     • thiamine  100 mg Oral Daily Gisell Castro MD          Today, Patient Was Seen By: Arturo Gordon MD    **Please Note: This note may have been constructed using a voice recognition system  **

## 2022-12-07 NOTE — ASSESSMENT & PLAN NOTE
-Ongoing watery diarrhea  -Rectal tube remains in place  -Stool C diff PCR negative  -Stool studies negative  -cont cholestyramine  -standing imodium increased to QID on 12/5/22  -colonoscopy tomorrow

## 2022-12-07 NOTE — ASSESSMENT & PLAN NOTE
-Ongoing watery diarrhea  -Rectal tube remains in place  -Stool C diff PCR negative  -Stool studies negative  -cont cholestyramine  -standing imodium increased to QID on 12/5/22  -colonoscopy today

## 2022-12-07 NOTE — ASSESSMENT & PLAN NOTE
-CT head in ER: No acute intracranial abnormality   Encephalomalacia involving the right occipital lobe and bilateral cerebelli   Grossly stable appearance of periventricular hypoattenuation compared to MRI brain 8/29/2022, likely representing chronic microvascular ischemic changes    -H/o CVA x 3 in setting of antiphospholipid syndrome  -cont Coumadin for anticoagulation after colonoscopy (currently INR therapeutic, will d/w GI restarting coumadin after colonoscopy)  -Awaiting placement to SNF for rehabilitation (after rectal tube can be removed)

## 2022-12-08 ENCOUNTER — ANESTHESIA (INPATIENT)
Dept: GASTROENTEROLOGY | Facility: HOSPITAL | Age: 62
End: 2022-12-08

## 2022-12-08 ENCOUNTER — APPOINTMENT (INPATIENT)
Dept: GASTROENTEROLOGY | Facility: HOSPITAL | Age: 62
End: 2022-12-08

## 2022-12-08 ENCOUNTER — ANESTHESIA EVENT (INPATIENT)
Dept: GASTROENTEROLOGY | Facility: HOSPITAL | Age: 62
End: 2022-12-08

## 2022-12-08 LAB
INR PPP: 2.43 (ref 0.84–1.19)
PROTHROMBIN TIME: 26.7 SECONDS (ref 11.6–14.5)

## 2022-12-08 PROCEDURE — 0DB68ZX EXCISION OF STOMACH, VIA NATURAL OR ARTIFICIAL OPENING ENDOSCOPIC, DIAGNOSTIC: ICD-10-PCS | Performed by: INTERNAL MEDICINE

## 2022-12-08 PROCEDURE — 0DBG8ZX EXCISION OF LEFT LARGE INTESTINE, VIA NATURAL OR ARTIFICIAL OPENING ENDOSCOPIC, DIAGNOSTIC: ICD-10-PCS | Performed by: INTERNAL MEDICINE

## 2022-12-08 PROCEDURE — 0DBF8ZX EXCISION OF RIGHT LARGE INTESTINE, VIA NATURAL OR ARTIFICIAL OPENING ENDOSCOPIC, DIAGNOSTIC: ICD-10-PCS | Performed by: INTERNAL MEDICINE

## 2022-12-08 RX ORDER — PROPOFOL 10 MG/ML
INJECTION, EMULSION INTRAVENOUS AS NEEDED
Status: DISCONTINUED | OUTPATIENT
Start: 2022-12-08 | End: 2022-12-08

## 2022-12-08 RX ORDER — WARFARIN SODIUM 5 MG/1
10 TABLET ORAL
Status: DISCONTINUED | OUTPATIENT
Start: 2022-12-08 | End: 2022-12-09 | Stop reason: HOSPADM

## 2022-12-08 RX ORDER — EPHEDRINE SULFATE 50 MG/ML
INJECTION INTRAVENOUS AS NEEDED
Status: DISCONTINUED | OUTPATIENT
Start: 2022-12-08 | End: 2022-12-08

## 2022-12-08 RX ORDER — PROPOFOL 10 MG/ML
INJECTION, EMULSION INTRAVENOUS CONTINUOUS PRN
Status: DISCONTINUED | OUTPATIENT
Start: 2022-12-08 | End: 2022-12-08

## 2022-12-08 RX ORDER — LIDOCAINE HYDROCHLORIDE 10 MG/ML
INJECTION, SOLUTION EPIDURAL; INFILTRATION; INTRACAUDAL; PERINEURAL AS NEEDED
Status: DISCONTINUED | OUTPATIENT
Start: 2022-12-08 | End: 2022-12-08

## 2022-12-08 RX ORDER — SODIUM CHLORIDE 9 MG/ML
20 INJECTION, SOLUTION INTRAVENOUS CONTINUOUS
Status: CANCELLED | OUTPATIENT
Start: 2022-12-08

## 2022-12-08 RX ORDER — POLYETHYLENE GLYCOL 3350 17 G/17G
17 POWDER, FOR SOLUTION ORAL DAILY
Status: DISCONTINUED | OUTPATIENT
Start: 2022-12-08 | End: 2022-12-09 | Stop reason: HOSPADM

## 2022-12-08 RX ORDER — ONDANSETRON 2 MG/ML
4 INJECTION INTRAMUSCULAR; INTRAVENOUS ONCE AS NEEDED
Status: DISCONTINUED | OUTPATIENT
Start: 2022-12-08 | End: 2022-12-09 | Stop reason: HOSPADM

## 2022-12-08 RX ADMIN — METOPROLOL SUCCINATE 100 MG: 100 TABLET, EXTENDED RELEASE ORAL at 08:16

## 2022-12-08 RX ADMIN — CYANOCOBALAMIN TAB 500 MCG 500 MCG: 500 TAB at 08:16

## 2022-12-08 RX ADMIN — BUSPIRONE HYDROCHLORIDE 5 MG: 5 TABLET ORAL at 08:16

## 2022-12-08 RX ADMIN — LORAZEPAM 0.5 MG: 0.5 TABLET ORAL at 08:16

## 2022-12-08 RX ADMIN — PROPOFOL 60 MG: 10 INJECTION, EMULSION INTRAVENOUS at 11:15

## 2022-12-08 RX ADMIN — FOLIC ACID 1 MG: 1 TABLET ORAL at 08:16

## 2022-12-08 RX ADMIN — BUSPIRONE HYDROCHLORIDE 5 MG: 5 TABLET ORAL at 16:29

## 2022-12-08 RX ADMIN — WARFARIN SODIUM 10 MG: 5 TABLET ORAL at 16:44

## 2022-12-08 RX ADMIN — ESCITALOPRAM OXALATE 10 MG: 10 TABLET ORAL at 08:16

## 2022-12-08 RX ADMIN — POTASSIUM CHLORIDE 40 MEQ: 1500 TABLET, EXTENDED RELEASE ORAL at 16:29

## 2022-12-08 RX ADMIN — EPHEDRINE SULFATE 5 MG: 50 INJECTION INTRAVENOUS at 11:27

## 2022-12-08 RX ADMIN — POTASSIUM CHLORIDE 40 MEQ: 1500 TABLET, EXTENDED RELEASE ORAL at 08:16

## 2022-12-08 RX ADMIN — THIAMINE HCL TAB 100 MG 100 MG: 100 TAB at 08:16

## 2022-12-08 RX ADMIN — LORAZEPAM 0.5 MG: 0.5 TABLET ORAL at 16:29

## 2022-12-08 RX ADMIN — PROPOFOL 150 MCG/KG/MIN: 10 INJECTION, EMULSION INTRAVENOUS at 11:15

## 2022-12-08 RX ADMIN — LIDOCAINE HYDROCHLORIDE 50 MG: 10 INJECTION, SOLUTION EPIDURAL; INFILTRATION; INTRACAUDAL; PERINEURAL at 11:15

## 2022-12-08 RX ADMIN — EPHEDRINE SULFATE 10 MG: 50 INJECTION INTRAVENOUS at 11:18

## 2022-12-08 RX ADMIN — BISACODYL 20 MG: 5 TABLET, COATED ORAL at 05:54

## 2022-12-08 RX ADMIN — B-COMPLEX W/ C & FOLIC ACID TAB 1 TABLET: TAB at 08:16

## 2022-12-08 NOTE — PHYSICAL THERAPY NOTE
Physical Therapy Progress Note     12/08/22 1040   PT Last Visit   PT Visit Date 12/08/22   Note Type   Note Type Treatment   Pain Assessment   Pain Assessment Tool 0-10   Pain Score No Pain   Restrictions/Precautions   Other Precautions Fall Risk;Bed Alarm; Chair Alarm;Cognitive  (Alarm active post session )   Subjective   Subjective The patient had several questions about his colonoscopy and the rectal tube  He was amenable to work with therapy  He attests to standing with OT earlier  Bed Mobility   Supine to Sit 4  Minimal assistance   Additional items Assist x 1; Increased time required;Verbal cues;LE management;HOB elevated   Sit to Supine 4  Minimal assistance   Additional items Assist x 1; Increased time required;Verbal cues;LE management   Transfers   Sit to Stand 4  Minimal assistance   Additional items Assist x 1; Increased time required;Verbal cues   Stand to Sit 4  Minimal assistance   Additional items Assist x 1; Increased time required;Verbal cues   Ambulation/Elevation   Gait pattern Excessively slow; Short stride; Inconsistent lucien;Decreased foot clearance   Gait Assistance 4  Minimal assist   Additional items Assist x 1; Tactile cues; Verbal cues   Assistive Device Rolling walker   Distance 10 feet x3  Balance   Static Sitting Fair +   Dynamic Sitting Fair   Static Standing Fair -   Ambulatory Poor +   Activity Tolerance   Activity Tolerance Patient tolerated treatment well;Patient limited by fatigue   Exercises   TKR Sitting;10 reps;AROM; Bilateral   Assessment   Prognosis Fair   Problem List Decreased strength;Decreased range of motion;Decreased endurance; Impaired balance;Decreased mobility; Decreased cognition; Impaired judgement;Decreased safety awareness;Pain   Assessment The patient was fatigued this morning, and he perseverated on his colonoscopy and rectal tube  He did follow one-step commands with increased time, but he fatigued rapidly this morning   This could potentially be attributed to working with occupational therapy this morning  He required multiple seated rests with short-distance ambulation today  He will benefit from continued therapy in order to maximize his functional mobility  Barriers to Discharge Inaccessible home environment;Decreased caregiver support   Goals   Patient Goals To have his colonoscopy  STG Expiration Date 12/09/22   PT Treatment Day 14   Plan   Treatment/Interventions Functional transfer training;LE strengthening/ROM; Therapeutic exercise; Endurance training;Cognitive reorientation;Patient/family training;Bed mobility;Gait training   Progress Progressing toward goals   PT Frequency 2-3x/wk   Recommendation   PT Discharge Recommendation Post acute rehabilitation services   Equipment Recommended [facility] Recommended Wheeled walker   AM-PAC Basic Mobility Inpatient   Turning in Bed Without Bedrails 3   Lying on Back to Sitting on Edge of Flat Bed 3   Moving Bed to Chair 3   Standing Up From Chair 3   Walk in Room 3   Climb 3-5 Stairs 2   Basic Mobility Inpatient Raw Score 17   Basic Mobility Standardized Score 39 67   Highest Level Of Mobility   JH-HLM Goal 5: Stand one or more mins   JH-HLM Achieved 7: Walk 25 feet or more       An AM-PAC Basic Mobility standardized score less than 40 78 suggests the patient may benefit from discharge to post-acute rehab services      Rasta Hlaey, PTA

## 2022-12-08 NOTE — ASSESSMENT & PLAN NOTE
-Per chart review patient has had low TSH and elevated free T4 in the past   -Most recent labs during this admission with TSH 0 282 and free t4 1 56  -Thyroid U/S during last admission at [facility] was unremarkable   -Evaluated by endocrinology in the past during previous admission at [facility] (Oct 22) and they recommend get repeat TFT, TSI and thyroid abs   -Endocrinology input noted patient likely subclinical hypothyroidism  -Outpatient endocrinology follow-up recommended

## 2022-12-08 NOTE — OCCUPATIONAL THERAPY NOTE
Occupational Therapy Progress Note     Patient Name: Mateo PAVON Date: 12/8/2022  Problem List  Principal Problem:    SHIMON (generalized anxiety disorder)  Active Problems:    Paroxysmal atrial fibrillation (HCC)    Antiphospholipid antibody with hypercoagulable state     Tremor of right hand    History of CVA (cerebrovascular accident)    Multiple electrolyte abnormalities    Severe protein-calorie malnutrition     Hyperthyroidism    Cognitive impairment    Diarrhea          12/08/22 0931   OT Last Visit   OT Visit Date 12/08/22   Note Type   Note Type Treatment   Pain Assessment   Pain Assessment Tool 0-10   Pain Score 6   Pain Location/Orientation Location: Buttocks  (rectal tube)   Hospital Pain Intervention(s) Repositioned; Ambulation/increased activity   Restrictions/Precautions   Other Precautions Chair Alarm;Cognitive; Bed Alarm; Fall Risk;Pain  (rectal tube)   Lifestyle   Autonomy Per EMR, at baseline pt is I with ADLS and mobility  Reciprocal Relationships Sister   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   Intrinsic Gratification Building model cars and walking   ADL   Where Assessed   (Pt reports he already performed ADLs this morning)   Bed Mobility   Supine to Sit 5  Supervision   Additional items HOB elevated; Bedrails; Increased time required   Sit to Supine 4  Minimal assistance   Additional items Assist x 1;HOB elevated; Bedrails; Increased time required;LE management   Transfers   Sit to Stand 3  Moderate assistance   Additional items Assist x 1; Increased time required   Stand to Sit 3  Moderate assistance   Additional items Assist x 1; Increased time required   Functional Mobility   Functional Mobility 4  Minimal assistance   Additional Comments Ax1   Additional items Hand hold assistance   Therapeutic Excerise-Strength   UE Strength Yes   Right Upper Extremity- Strength   R Shoulder Flexion;ABduction; Extension  (A to perform shoulder flexion > 90*)   R Elbow Elbow flexion;Elbow extension   R Weight/Reps/Sets x30   RUE Strength Comment utilized full gatorade bottle as weight   Left Upper Extremity-Strength   L Shoulder ABduction; Flexion; Extension  (A to perform shoulder flexion > 90*)   L Elbow Elbow flexion;Elbow extension   L Weights/Reps/Sets x30   LUE Strength Comment utilized full gatorade bottle as weight   Subjective   Subjective "When is my procedure?"   Cognition   Overall Cognitive Status Impaired   Arousal/Participation Responsive; Cooperative   Attention Attends with cues to redirect   Orientation Level Oriented X4   Memory Decreased short term memory   Following Commands Follows one step commands with increased time or repetition   Comments Pt cooperative during session, flat affect  Perseverates on upcoming procedure   Activity Tolerance   Activity Tolerance Patient limited by fatigue   Assessment   Assessment Patient participated in Skilled OT session this date with interventions consisting of Energy Conservation techniques, safety awareness and fall prevention techniques, therapeutic exercise to: increase functional use of BUEs, increase BUE muscle strength  and  therapeutic activities to: increase activity tolerance   Upon arrival patient was found supine in bed  Pt demonstrated the following tasks: S sup to sit, MIN A sit to sup  Pt maintains EOB sitting position for UE therex with S  MOD A for STS and MIN A for fnxl mobility  Pt deferred additional ADLs at this time as he previously performed earlier this AM  Patient continues to be functioning below baseline level, occupational performance remains limited secondary to factors listed above and increased risk for falls and injury  From OT standpoint, recommendation at time of d/c would be STR  Patient to benefit from continued Occupational Therapy treatment while in the hospital to address deficits as defined above and maximize level of functional independence with ADLs and functional mobility   Pt was left after session with all current needs met  The patient's raw score on the AM-PAC Daily Activity inpatient short form is 15, standardized score is 34 69, less than 39 4  Patients at this level are likely to benefit from discharge to post-acute rehabilitation services  Please refer to the recommendation of the Occupational Therapist for safe discharge planning  Plan   Treatment Interventions ADL retraining;Functional transfer training;UE strengthening/ROM; Endurance training;Patient/family training;Cognitive reorientation;Equipment evaluation/education; Compensatory technique education;UE splinting; Energy conservation; Activityengagement;Continued evaluation   Goal Expiration Date 12/21/22   OT Treatment Day 12   OT Frequency   (2-4x/wk)   Recommendation   OT Discharge Recommendation Post acute rehabilitation services   [facility] Daily Activity Inpatient   Lower Body Dressing 2   Bathing 2   Toileting 2   Upper Body Dressing 3   Grooming 3   Eating 3   Daily Activity Raw Score 15   Daily Activity Standardized Score (Calc for Raw Score >=11) 34 69   AM-PAC Applied Cognition Inpatient   Following a Speech/Presentation 3   Understanding Ordinary Conversation 4   Taking Medications 3   Remembering Where Things Are Placed or Put Away 3   Remembering List of 4-5 Errands 2   Taking Care of Complicated Tasks 2   Applied Cognition Raw Score 17   Applied Cognition Standardized Score 36 52     Sorin Coello MS, OTR/L

## 2022-12-08 NOTE — ASSESSMENT & PLAN NOTE
-diagnosis is now overflow diarrhea due to constipation  -pt had ongoing watery diarrhea requiring a rectal tube  -Stool C diff PCR negative  -Stool studies negative  -Patient was treated with cholestyramine and Imodium  -Patient was seen in consultation by GI  -Colonoscopy 12/8/22: Solid stool throughout the colon  Performed pancolonic forceps biopsies to rule out colitis and IBD  Some areas of erythema seen likely due to fecal stasis vs colitis  Biopsies taken  The terminal ileum appeared normal   -EGD 12/8/22: The esophagus appeared normal  Five polyps measuring smaller than 5 mm in the fundus of the stomach  Single small nodule in the antrum; performed cold forceps biopsy  Performed forceps biopsies to rule out H  pylori in the body of the stomach, incisura and antrum  Two biopsies taken from the antrum, two biopsies from the body and one biopsy taken from the incisura of the stomach  Rule out H pylori   Performed 6 forceps biopsies to rule out celiac disease in the 1st part of the duodenum and 2nd part of the duodenum   -Imodium/Questran now stopped and Miralax/dulcolax started

## 2022-12-08 NOTE — ANESTHESIA POSTPROCEDURE EVALUATION
Post-Op Assessment Note    CV Status:  Stable  Pain Score: 0    Pain management: adequate  Multimodal analgesia used between 6 hours prior to anesthesia start to PACU discharge    Mental Status:  Alert   Hydration Status:  Stable   PONV Controlled:  None   Two or more mitigation strategies used for obstructive sleep apnea   Post Op Vitals Reviewed: Yes      Staff: CRNA         No notable events documented      BP   107/57   Temp  98   Pulse  70   Resp   14   SpO2   98

## 2022-12-08 NOTE — PLAN OF CARE
Problem: PHYSICAL THERAPY ADULT  Goal: Performs mobility at highest level of function for planned discharge setting  See evaluation for individualized goals  Description: Treatment/Interventions: LE strengthening/ROM, Functional transfer training, Endurance training, Patient/family training, Bed mobility, Cognitive reorientation, Therapeutic exercise          See flowsheet documentation for full assessment, interventions and recommendations  Outcome: Progressing  Note: Prognosis: Fair  Problem List: Decreased strength, Decreased range of motion, Decreased endurance, Impaired balance, Decreased mobility, Decreased cognition, Impaired judgement, Decreased safety awareness, Pain  Assessment: The patient was fatigued this morning, and he perseverated on his colonoscopy and rectal tube  He did follow one-step commands with increased time, but he fatigued rapidly this morning  This could potentially be attributed to working with occupational therapy this morning  He required multiple seated rests with short-distance ambulation today  He will benefit from continued therapy in order to maximize his functional mobility  Barriers to Discharge: Inaccessible home environment, Decreased caregiver support     PT Discharge Recommendation: Post acute rehabilitation services    See flowsheet documentation for full assessment

## 2022-12-08 NOTE — PLAN OF CARE
Problem: PAIN - ADULT  Goal: Verbalizes/displays adequate comfort level or baseline comfort level  Description: Interventions:  - Encourage patient to monitor pain and request assistance  - Assess pain using appropriate pain scale  - Administer analgesics based on type and severity of pain and evaluate response  - Implement non-pharmacological measures as appropriate and evaluate response  - Consider cultural and social influences on pain and pain management  - Notify physician/advanced practitioner if interventions unsuccessful or patient reports new pain  12/8/2022 0006 by Mary Julian RN  Outcome: Progressing  12/7/2022 2351 by Mary Julian RN  Outcome: Progressing     Problem: SAFETY ADULT  Goal: Patient will remain free of falls  Description: INTERVENTIONS:  - Educate patient/family on patient safety including physical limitations  - Instruct patient to call for assistance with activity   - Consult OT/PT to assist with strengthening/mobility   - Keep Call bell within reach  - Keep bed low and locked with side rails adjusted as appropriate  - Keep care items and personal belongings within reach  - Initiate and maintain comfort rounds  - Make Fall Risk Sign visible to staff  - Offer Toileting every 2 Hours, in advance of need  - Initiate/Maintain bed alarm  - Obtain necessary fall risk management equipment:   - Apply yellow socks and bracelet for high fall risk patients  - Consider moving patient to room near nurses station  12/8/2022 0006 by Mary Julian RN  Outcome: Progressing  12/7/2022 2351 by Mary Julian RN  Outcome: Progressing     Problem: Prexisting or High Potential for Compromised Skin Integrity  Goal: Skin integrity is maintained or improved  Description: INTERVENTIONS:  - Identify patients at risk for skin breakdown  - Assess and monitor skin integrity  - Assess and monitor nutrition and hydration status  - Monitor labs   - Assess for incontinence   - Turn and reposition patient  - Assist with mobility/ambulation  - Relieve pressure over bony prominences  - Avoid friction and shearing  - Provide appropriate hygiene as needed including keeping skin clean and dry  - Evaluate need for skin moisturizer/barrier cream  - Collaborate with interdisciplinary team   - Patient/family teaching  - Consider wound care consult   Outcome: Progressing

## 2022-12-08 NOTE — PROGRESS NOTES
[facility]  Progress Note - Anali Urbina 1960, 58 y o  male MRN: [id number]  [facility] Encounter: [id number]  Primary Care Provider: Galilea Shelton MD   Date and time admitted to hospital: 11/3/2022  3:28 PM    * SHIMON (generalized anxiety disorder)  Assessment & Plan  -Previous admission from the end of September until the end of October at [facility] in which he was hospitalized for over a month for severe anxiety and inability to care for self  -Per sister reportedly was able to ambulate prior to the hospitalization but after the hospitalization patient needing assistance with ambulation at home  -During hospitalization there patient developed hyponatremia during his course of stay and Zoloft that had been started was stopped due to hyponatremia  -Recently discharged from inpatient psychiatry with mirtazapine and aripiprazole  -Pt seen in consultation by Psychiatry, started on Buspar/Lexapro  -Reconsulted psychiatry for re-evaluation/possible IP psych admission for [facility] - virtual psychiatry on 11/18/22 recommended inpatient psychiatry admission, in-person evaluation with our on-site psychiatrist 11/21/22 did not feel this was necessary  Recommended increasing escitalopram, making lorazepam scheduled as patient does not ask for it but his anxiety might benefit from it, and continuing BusPar   Overall plan is to help patient improve his anxiety so that he can get to rehab   -as per psychiatry, continue current Lexapro/Buspar/Ativan regimen    Diarrhea  Assessment & Plan  -Ongoing watery diarrhea  -Rectal tube remains in place  -Stool C diff PCR negative  -Stool studies negative  -cont cholestyramine  -standing imodium increased to QID on 12/5/22  -colonoscopy today      Cognitive impairment  Assessment & Plan  -Per neuropsychology evaluation on 11/14/22, patient does NOT have capacity to make medical decisions   -Patient also has some level of cognitive impairment   -B12 borderline low, continue supplementation      Hyperthyroidism  Assessment & Plan  -Per chart review patient has had low TSH and elevated free T4 in the past   -Most recent labs during this admission with TSH 0 282 and free t4 1 56  -Thyroid U/S during last admission at [address] [place] [address] was unremarkable   -Evaluated by endocrinology in the past during previous admission at [address] [place] [address] (Oct 22) and they recommend get repeat TFT, TSI and thyroid abs   -Endocrinology input noted patient likely subclinical hypothyroidism  -Outpatient endocrinology follow-up recommended    Severe protein-calorie malnutrition   Assessment & Plan  -Encourage adequate protein and calorie intake    Multiple electrolyte abnormalities  Assessment & Plan  -currently K and Mg normal  -cont PO K    Tremor of right hand  Assessment & Plan  -will need outpatient follow-up w/ neurology     History of CVA (cerebrovascular accident)  Assessment & Plan  -CT head in ER: No acute intracranial abnormality   Encephalomalacia involving the right occipital lobe and bilateral cerebelli   Grossly stable appearance of periventricular hypoattenuation compared to MRI brain 8/29/2022, likely representing chronic microvascular ischemic changes    -H/o CVA x 3 in setting of antiphospholipid syndrome  -cont Coumadin for anticoagulation after colonoscopy (currently INR therapeutic, will d/w GI restarting coumadin after colonoscopy)  -Awaiting placement to SNF for rehabilitation (after rectal tube can be removed)    Antiphospholipid antibody with hypercoagulable state   Assessment & Plan  -Continue Coumadin (discuss restarting coumadin after colonoscopy with GI), Goal INR 2-3  -had ischemic stroke in the past despite Eliquis, hence, deemed Eliquis failure      Paroxysmal atrial fibrillation (HCC)  Assessment & Plan  -cont Toprol-XL  -discuss restarting coumadin after colonoscopy with GI  -cont to trend INR      VTE Pharmacologic Prophylaxis: VTE Score: 3 currently INR therapeutic, will d/w GI restarting coumadin after colonoscopy    Patient Centered Rounds: I performed bedside rounds with nursing staff today  Time Spent for Care: 20 minutes  More than 50% of total time spent on counseling and coordination of care as described above  Current Length of Stay: 35 day(s)  Current Patient Status: Inpatient   Certification Statement: The patient will continue to require additional inpatient hospital stay due to diarrhea, colonoscopy today  Discharge Plan: Anticipate discharge in 24-48 hrs to rehab facility  Code Status: Level 3 - DNAR and DNI    Subjective:   Pt denies CP/SOB  Objective:     Vitals:   Temp (24hrs), Av 4 °F (36 3 °C), Min:97 3 °F (36 3 °C), Max:97 5 °F (36 4 °C)    Temp:  [97 3 °F (36 3 °C)-97 5 °F (36 4 °C)] 97 5 °F (36 4 °C)  Resp:  [18] 18  BP: (123-154)/(84-99) 154/99  SpO2:  [98 %] 98 %  Body mass index is 27 12 kg/m²  Input and Output Summary (last 24 hours):      Intake/Output Summary (Last 24 hours) at 2022 0847  Last data filed at 2022 0401  Gross per 24 hour   Intake 620 ml   Output 3250 ml   Net -2630 ml       Physical Exam:   Gen: NAD, Awake and alert, well developed, well nourished  Eyes: remains EOMI, PERRLA, no scleral icterus  ENMT:  no nasal discharge, no otic discharge, moist mucous membranes  Neck:  Supple  Cardiovascular: continues to remain Regular rate and rhythm, normal S1-S2, no murmurs, rubs, or gallops  Lungs: remains clear to auscultation bilaterally, no wheezes, or rales, or rhonchi  Abdomen: Positive bowel sounds, soft, nontender, nondistended, no palpable organomegaly, rectal tube in place with loose brown stool   Skin:  Intact, no obvious lesions or rashes, no edema  Neuro: Cranial nerves 2-12 are intact, non-focal, moves all 4 extremities, resting tremor  Psych: flat affect, mildly anxious    Additional Data:     Labs:  Results from last 7 days   Lab Units 22  1353   WBC Thousand/uL 9 10   HEMOGLOBIN g/dL 12 1   HEMATOCRIT % 36 0*   PLATELETS Thousands/uL 189     Results from last 7 days   Lab Units 12/07/22  1353 12/02/22  0445   SODIUM mmol/L 137 140   POTASSIUM mmol/L 4 0 4 1   CHLORIDE mmol/L 106 111*   CO2 mmol/L 23 24   BUN mg/dL 12 9   CREATININE mg/dL 0 75 0 53*   ANION GAP mmol/L 8 5   CALCIUM mg/dL 9 0 8 9   ALBUMIN g/dL  --  2 0*   GLUCOSE RANDOM mg/dL 111 104     Results from last 7 days   Lab Units 12/08/22  0447   INR  2 43*                   Lines/Drains:  Invasive Devices     Peripheral Intravenous Line  Duration           Peripheral IV 12/06/22 Distal;Upper;Ventral (anterior); Left Arm 1 day          Drain  Duration           Rectal Tube With balloon 14 days                Recent Cultures (last 7 days):         Last 24 Hours Medication List:   Current Facility-Administered Medications   Medication Dose Route Frequency Provider Last Rate   • busPIRone  5 mg Oral BID Kee Riddle MD     • cholestyramine sugar free  4 g Oral BID Panchito Henderson MD     • vitamin B-12  500 mcg Oral Daily Jasmit Kaylie, DO     • cyanocobalamin  1,000 mcg Intramuscular Q30 Days Jasmit Kaylie, DO     • escitalopram  10 mg Oral Daily Denita Salter MD     • fluticasone  1 spray Each Nare Daily Tana Lott DO     • folic acid  1 mg Oral Daily Kee Riddle MD     • LORazepam  0 5 mg Oral BID Jorge Alarcon MD     • metoprolol  5 mg Intravenous Q6H PRN Geraldo Mares MD     • metoprolol succinate  100 mg Oral Daily Jorge Alarcon MD     • multivitamin stress formula  1 tablet Oral Daily Kee Riddle MD     • potassium chloride  40 mEq Oral BID Jorge Alarcon MD     • sodium chloride  1 spray Each Nare Q1H PRN Jorge Alarcon MD     • thiamine  100 mg Oral Daily Kee Riddle MD          Today, Patient Was Seen By: Damon Mas MD    **Please Note: This note may have been constructed using a voice recognition system  **

## 2022-12-08 NOTE — PLAN OF CARE
Problem: OCCUPATIONAL THERAPY ADULT  Goal: Performs self-care activities at highest level of function for planned discharge setting  See evaluation for individualized goals  Description: Treatment Interventions: ADL retraining, Functional transfer training, UE strengthening/ROM, Endurance training, Patient/family training, Cognitive reorientation, Equipment evaluation/education, Compensatory technique education, Continued evaluation, Energy conservation, Activityengagement          See flowsheet documentation for full assessment, interventions and recommendations  Outcome: Progressing  Note: Limitation: Decreased ADL status, Decreased endurance, Decreased cognition, Decreased self-care trans, Decreased high-level ADLs  Prognosis: Fair  Assessment: Patient participated in Skilled OT session this date with interventions consisting of Energy Conservation techniques, safety awareness and fall prevention techniques, therapeutic exercise to: increase functional use of BUEs, increase BUE muscle strength  and  therapeutic activities to: increase activity tolerance   Upon arrival patient was found supine in bed  Pt demonstrated the following tasks: S sup to sit, MIN A sit to sup  Pt maintains EOB sitting position for UE therex with S  MOD A for STS and MIN A for fnxl mobility  Pt deferred additional ADLs at this time as he previously performed earlier this AM  Patient continues to be functioning below baseline level, occupational performance remains limited secondary to factors listed above and increased risk for falls and injury  From OT standpoint, recommendation at time of d/c would be STR  Patient to benefit from continued Occupational Therapy treatment while in the hospital to address deficits as defined above and maximize level of functional independence with ADLs and functional mobility  Pt was left after session with all current needs met   The patient's raw score on the AM-PAC Daily Activity inpatient short form is 15, standardized score is 34 69, less than 39 4  Patients at this level are likely to benefit from discharge to post-acute rehabilitation services  Please refer to the recommendation of the Occupational Therapist for safe discharge planning       OT Discharge Recommendation: Post acute rehabilitation services

## 2022-12-08 NOTE — ASSESSMENT & PLAN NOTE
-cont Toprol-XL  -coumadin was on hold (after fall and prior to colonoscopy), now restarted  -cont to trend INR, currently 2 89

## 2022-12-08 NOTE — RESTORATIVE TECHNICIAN NOTE
Restorative Technician Note      Patient Name: Catalina Dumont     Restorative Tech Visit Date: 12/08/22  Note Type: Mobility  Patient Position Upon Consult: Supine  Activity Performed: Ambulated  Assistive Device: Other (Comment) (BHHAx2; stand-pivot transfer onto transport stretcher)  Patient Position at End of Consult: Supine; All needs within reach;  Other (comment) (pt in supine on transport stretcher; left in the care of transport team)      Fito Jauregui

## 2022-12-08 NOTE — ASSESSMENT & PLAN NOTE
-Continue Coumadin, Goal INR 2-3 (currently 2 89)  -had ischemic stroke in the past despite Eliquis, hence, deemed Eliquis failure

## 2022-12-08 NOTE — ASSESSMENT & PLAN NOTE
-CT head in ER: No acute intracranial abnormality   Encephalomalacia involving the right occipital lobe and bilateral cerebelli   Grossly stable appearance of periventricular hypoattenuation compared to MRI brain 8/29/2022, likely representing chronic microvascular ischemic changes    -H/o CVA x 3 in setting of antiphospholipid syndrome  -cont Coumadin for anticoagulation (INR currently 2 89)  -Awaiting placement to SNF for rehabilitation

## 2022-12-08 NOTE — ASSESSMENT & PLAN NOTE
-Previous admission from the end of September until the end of October at [facility] in which he was hospitalized for over a month for severe anxiety and inability to care for self  -Per sister reportedly was able to ambulate prior to the hospitalization but after the hospitalization patient needing assistance with ambulation at home  -During hospitalization there patient developed hyponatremia during his course of stay and Zoloft that had been started was stopped due to hyponatremia  -Recently discharged from inpatient psychiatry with mirtazapine and aripiprazole  -Pt seen in consultation by Psychiatry, started on Buspar/Lexapro  -Reconsulted psychiatry for re-evaluation/possible IP psych admission for [facility] - virtual psychiatry on 11/18/22 recommended inpatient psychiatry admission, in-person evaluation with our on-site psychiatrist 11/21/22 did not feel this was necessary  Recommended increasing escitalopram, making lorazepam scheduled as patient does not ask for it but his anxiety might benefit from it, and continuing BusPar   Overall plan is to help patient improve his anxiety so that he can get to rehab   -as per psychiatry, continue current Lexapro/Buspar/Ativan regimen

## 2022-12-08 NOTE — PLAN OF CARE
Problem: PAIN - ADULT  Goal: Verbalizes/displays adequate comfort level or baseline comfort level  Description: Interventions:  - Encourage patient to monitor pain and request assistance  - Assess pain using appropriate pain scale  - Administer analgesics based on type and severity of pain and evaluate response  - Implement non-pharmacological measures as appropriate and evaluate response  - Consider cultural and social influences on pain and pain management  - Notify physician/advanced practitioner if interventions unsuccessful or patient reports new pain  Outcome: Progressing     Problem: SAFETY ADULT  Goal: Patient will remain free of falls  Description: INTERVENTIONS:  - Educate patient/family on patient safety including physical limitations  - Instruct patient to call for assistance with activity   - Consult OT/PT to assist with strengthening/mobility   - Keep Call bell within reach  - Keep bed low and locked with side rails adjusted as appropriate  - Keep care items and personal belongings within reach  - Initiate and maintain comfort rounds  - Make Fall Risk Sign visible to staff  - Offer Toileting every 2 Hours, in advance of need  - Initiate/Maintain bed alarm  - Obtain necessary fall risk management equipment:   - Apply yellow socks and bracelet for high fall risk patients  - Consider moving patient to room near nurses station  Outcome: Progressing

## 2022-12-08 NOTE — ANESTHESIA PREPROCEDURE EVALUATION
Procedure:  EGD  COLONOSCOPY    Relevant Problems   CARDIO   (+) Gastric AVM   (+) Hyperlipidemia   (+) Hypertension   (+) PVD (peripheral vascular disease) (HCC)   (+) Paroxysmal atrial fibrillation (HCC)      ENDO   (+) Hyperthyroidism   (+) Subclinical hyperthyroidism      GI/HEPATIC   (+) Bright red blood per rectum   (+) GERD (gastroesophageal reflux disease)      /RENAL   (+) Kidney stones   (+) Renal cyst      MUSCULOSKELETAL   (+) Low back pain      NEURO/PSYCH   (+) 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 attack   (+) SHIMON (generalized anxiety disorder)   (+) History of CVA (cerebrovascular accident)   (+) History of stroke      PULMONARY   (+) CORIE (obstructive sleep apnea)      Endocrine   (+) SIADH (syndrome of inappropriate ADH production) (HCC)      Hematopoietic and Hemostatic   (+) Antiphospholipid antibody with hypercoagulable state       Other   (+) Anticoagulated on Coumadin   (+) Ataxia   (+) Cognitive impairment   (+) Diarrhea   (+) H/O aortic aneurysm repair   (+) Medical clearance for incarceration        Physical Exam    Airway    Mallampati score: III  TM Distance: >3 FB  Neck ROM: full     Dental       Cardiovascular      Pulmonary      Other Findings        Anesthesia Plan  ASA Score- 3     Anesthesia Type- IV sedation with anesthesia with ASA Monitors  Additional Monitors:   Airway Plan:           Plan Factors-    Chart reviewed  EKG reviewed  Existing labs reviewed  Patient summary reviewed  Induction- intravenous  Postoperative Plan-     Informed Consent- Anesthetic plan and risks discussed with patient  I personally reviewed this patient with the CRNA  Discussed and agreed on the Anesthesia Plan with the CRNA  Telly Hernandez

## 2022-12-09 VITALS
SYSTOLIC BLOOD PRESSURE: 156 MMHG | RESPIRATION RATE: 17 BRPM | WEIGHT: 175.27 LBS | TEMPERATURE: 97.7 F | DIASTOLIC BLOOD PRESSURE: 96 MMHG | HEIGHT: 68 IN | HEART RATE: 99 BPM | BODY MASS INDEX: 26.56 KG/M2 | OXYGEN SATURATION: 97 %

## 2022-12-09 LAB
CALPROTECTIN STL-MCNT: 29 UG/G (ref 0–120)
ELASTASE PANC STL-MCNT: 220 UG ELAST./G
FAT STL QL: NORMAL
INR PPP: 2.89 (ref 0.84–1.19)
NEUTRAL FAT STL QL: NORMAL
PROTHROMBIN TIME: 30.5 SECONDS (ref 11.6–14.5)

## 2022-12-09 RX ORDER — LANOLIN ALCOHOL/MO/W.PET/CERES
100 CREAM (GRAM) TOPICAL DAILY
Refills: 0
Start: 2022-12-10

## 2022-12-09 RX ORDER — METOPROLOL SUCCINATE 100 MG/1
100 TABLET, EXTENDED RELEASE ORAL DAILY
Refills: 0
Start: 2022-12-10

## 2022-12-09 RX ORDER — FLUTICASONE PROPIONATE 50 MCG
1 SPRAY, SUSPENSION (ML) NASAL DAILY
Refills: 0
Start: 2022-12-10

## 2022-12-09 RX ORDER — POLYETHYLENE GLYCOL 3350 17 G/17G
17 POWDER, FOR SOLUTION ORAL DAILY
Refills: 0
Start: 2022-12-10

## 2022-12-09 RX ORDER — LORAZEPAM 0.5 MG/1
0.5 TABLET ORAL 2 TIMES DAILY
Qty: 4 TABLET | Refills: 0 | Status: SHIPPED | OUTPATIENT
Start: 2022-12-09 | End: 2022-12-11

## 2022-12-09 RX ORDER — BUSPIRONE HYDROCHLORIDE 5 MG/1
5 TABLET ORAL 2 TIMES DAILY
Refills: 0
Start: 2022-12-09

## 2022-12-09 RX ORDER — POTASSIUM CHLORIDE 20 MEQ/1
40 TABLET, EXTENDED RELEASE ORAL 2 TIMES DAILY
Refills: 0
Start: 2022-12-09

## 2022-12-09 RX ORDER — WARFARIN SODIUM 5 MG/1
10 TABLET ORAL
Refills: 0
Start: 2022-12-09

## 2022-12-09 RX ORDER — FOLIC ACID 1 MG/1
1 TABLET ORAL DAILY
Refills: 0
Start: 2022-12-10

## 2022-12-09 RX ORDER — ESCITALOPRAM OXALATE 10 MG/1
10 TABLET ORAL DAILY
Refills: 0
Start: 2022-12-10

## 2022-12-09 RX ORDER — CYANOCOBALAMIN 1000 UG/ML
1000 INJECTION, SOLUTION INTRAMUSCULAR; SUBCUTANEOUS
Qty: 1 ML | Refills: 0
Start: 2022-12-14

## 2022-12-09 RX ORDER — ECHINACEA PURPUREA EXTRACT 125 MG
1 TABLET ORAL
Refills: 0
Start: 2022-12-09

## 2022-12-09 RX ADMIN — CYANOCOBALAMIN TAB 500 MCG 500 MCG: 500 TAB at 07:35

## 2022-12-09 RX ADMIN — POTASSIUM CHLORIDE 40 MEQ: 1500 TABLET, EXTENDED RELEASE ORAL at 07:35

## 2022-12-09 RX ADMIN — ESCITALOPRAM OXALATE 10 MG: 10 TABLET ORAL at 07:35

## 2022-12-09 RX ADMIN — THIAMINE HCL TAB 100 MG 100 MG: 100 TAB at 07:35

## 2022-12-09 RX ADMIN — LORAZEPAM 0.5 MG: 0.5 TABLET ORAL at 07:35

## 2022-12-09 RX ADMIN — FLUTICASONE PROPIONATE 1 SPRAY: 50 SPRAY, METERED NASAL at 07:39

## 2022-12-09 RX ADMIN — FOLIC ACID 1 MG: 1 TABLET ORAL at 07:35

## 2022-12-09 RX ADMIN — METOPROLOL SUCCINATE 100 MG: 100 TABLET, EXTENDED RELEASE ORAL at 07:35

## 2022-12-09 RX ADMIN — BUSPIRONE HYDROCHLORIDE 5 MG: 5 TABLET ORAL at 07:35

## 2022-12-09 NOTE — CASE MANAGEMENT
Case Management Discharge Planning Note    Patient name Mateo Burgos  Location [address] [place] [address] [id number]  : 1960 Date 2022       Current Admission Date: 11/3/2022  Current Admission Diagnosis:SHIMON (generalized anxiety disorder)   Patient Active Problem List    Diagnosis Date Noted   • Overflow diarrhea 2022   • Cognitive impairment 2022   • Hyperthyroidism 11/10/2022   • Severe protein-calorie malnutrition  2022   • Hyponatremia 10/14/2022   • SIADH (syndrome of inappropriate ADH production) ([facility] [address] ) 10/14/2022   • Subclinical hyperthyroidism 10/14/2022   • Sinus arrhythmia 10/10/2022   • Mild protein-calorie malnutrition ([facility] [address] ) 10/07/2022   • Medical clearance for incarceration 2022   • 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 attack 2022   • Atypical chest pain 2022   • Multiple electrolyte abnormalities 2022   • Dizziness 2022   • Renal cyst 2022   • PVD (peripheral vascular disease) ([facility] [address] ) 2022   • Ataxia 2022   • History of CVA (cerebrovascular accident) 2022   • Right inguinal pain 2022   • Alkaline phosphatase elevation 2022   • Low back pain 2022   • S/P laparoscopic hernia repair 2021   • Non-recurrent bilateral inguinal hernia without obstruction or gangrene 2021   • Gastric polyps 2021   • Gastric AVM 2021   • Edema of both lower legs 2021   • Tremor of right hand 2021   • Weakness of both lower extremities 2021   • Anticoagulated on Coumadin 2021   • CORIE (obstructive sleep apnea)    • Hyperbilirubinemia 08/10/2020   • Antiphospholipid antibody with hypercoagulable state  2020   • History of stroke 2020   • Other viral warts 2019   • Physical deconditioning 2019   • Class 2 obesity in adult 2018   • Bright red blood per rectum 11/10/2017   • Numbness 2017   • H/O aortic aneurysm repair 08/26/2017   • Hypertension 08/26/2017   • GERD (gastroesophageal reflux disease) 08/26/2017   • Hyperlipidemia 03/17/2017   • Peyronie disease 12/09/2016   • Vitamin D deficiency 06/15/2016   • Paroxysmal atrial fibrillation ([address] ) 11/17/2014   • SHIMON (generalized anxiety disorder) 11/11/2014   • Constipation 09/19/2014   • Irritable bowel syndrome 04/24/2014   • Kidney stones 04/24/2014      LOS (days): 36  Geometric Mean LOS (GMLOS) (days): 5 00  Days to GMLOS:-30 5     OBJECTIVE:  Risk of Unplanned Readmission Score: 32 68         Current admission status: Inpatient   Preferred Pharmacy:   [address] [place] [address] [address] [place] [address]  Phone: [phone] Fax: [phone]    Primary Care Provider: Jackelin Baltazar MD    Primary Insurance: [address] [place] [address]  Secondary Insurance:     DISCHARGE DETAILS:    Discharge planning discussed with[de-identified] Andrés López (sister)  Freedom of Choice: Yes     CM contacted family/caregiver?: Yes  Were Treatment Team discharge recommendations reviewed with patient/caregiver?: Yes  Did patient/caregiver verbalize understanding of patient care needs?: Yes  Were patient/caregiver advised of the risks associated with not following Treatment Team discharge recommendations?: Yes    Contacts  Patient Contacts: Jesenia Arizmendi (sister)  Relationship to Patient[de-identified] Family  Contact Method: Phone  Phone Number: [phone]  Reason/Outcome: Continuity of Care, Emergency Contact, Discharge Planning, Referral              Other Referral/Resources/Interventions Provided:  Referral Comments: Confirmed with [facility] from [address]  They are able to accept the patient for admission today to [facility]  CM spoke with family  They are aware of the discharge plan and were given the contact information for the facility  MD and nursing also aware           Treatment Team Recommendation: Short Term Rehab  Discharge Destination Plan[de-identified] Short Term Rehab  Transport at Discharge : [facility]LS Ambulance  Dispatcher Contacted: Yes  Number/Name of Dispatcher: [facility]  Transported by [employer] and Unit #): [facility]  ETA of Transport (Date): 12/09/22  ETA of Transport (Time): 1200

## 2022-12-09 NOTE — ASSESSMENT & PLAN NOTE
-Per chart review patient has had low TSH and elevated free T4 in the past   -Most recent labs during this admission with TSH 0 282 and free t4 1 56  -Thyroid U/S during last admission at [facility] was unremarkable   -Evaluated by endocrinology in the past during previous admission at [facility] (Oct 22) and they recommend get repeat TFT, TSI and thyroid abs   -Endocrinology input noted patient likely subclinical hypothyroidism  -Outpatient endocrinology follow-up recommended

## 2022-12-09 NOTE — PROGRESS NOTES
[facility]  Progress Note - Kathy Cintron 1960, 58 y o  male MRN: [id number]  [facility] En[facility]: [id number]  Primary Care Provider: Christohper King MD   Date and time admitted to hospital: 11/3/2022  3:28 PM    Overflow diarrhea  Assessment & Plan  -diagnosis is now overflow diarrhea due to constipation  -pt had ongoing watery diarrhea requiring a rectal tube  -Stool C diff PCR negative  -Stool studies negative  -Patient was treated with cholestyramine and Imodium  -Patient was seen in consultation by GI  -Colonoscopy 12/8/22: Solid stool throughout the colon  Performed pancolonic forceps biopsies to rule out colitis and IBD  Some areas of erythema seen likely due to fecal stasis vs colitis  Biopsies taken  The terminal ileum appeared normal   -EGD 12/8/22: The esophagus appeared normal  Five polyps measuring smaller than 5 mm in the fundus of the stomach  Single small nodule in the antrum; performed cold forceps biopsy  Performed forceps biopsies to rule out H  pylori in the body of the stomach, incisura and antrum  Two biopsies taken from the antrum, two biopsies from the body and one biopsy taken from the incisura of the stomach  Rule out H pylori   Performed 6 forceps biopsies to rule out celiac disease in the 1st part of the duodenum and 2nd part of the duodenum   -Imodium/Questran now stopped and Miralax/dulcolax started      * SHIMON (generalized anxiety disorder)  Assessment & Plan  -Previous admission from the end of September until the end of October at [facility] in which he was hospitalized for over a month for severe anxiety and inability to care for self  -Per sister reportedly was able to ambulate prior to the hospitalization but after the hospitalization patient needing assistance with ambulation at home  -During hospitalization there patient developed hyponatremia during his course of stay and Zoloft that had been started was stopped due to hyponatremia  -Recently discharged from inpatient psychiatry with mirtazapine and aripiprazole  -Pt seen in consultation by Psychiatry, started on Buspar/Lexapro  -Reconsulted psychiatry for re-evaluation/possible IP psych admission for [facility] psychiatry on 11/18/22 recommended inpatient psychiatry admission, in-person evaluation with our on-site psychiatrist 11/21/22 did not feel this was necessary  Recommended increasing escitalopram, making lorazepam scheduled as patient does not ask for it but his anxiety might benefit from it, and continuing BusPar   Overall plan is to help patient improve his anxiety so that he can get to rehab   -as per psychiatry, continue current Lexapro/Buspar/Ativan regimen    Cognitive impairment  Assessment & Plan  -Per neuropsychology evaluation on 11/14/22, patient does NOT have capacity to make medical decisions   -Patient also has some level of cognitive impairment   -B12 borderline low, continue supplementation      Hyperthyroidism  Assessment & Plan  -Per chart review patient has had low TSH and elevated free T4 in the past   -Most recent labs during this admission with TSH 0 282 and free t4 1 56  -Thyroid U/S during last admission at [facility] was unremarkable   -Evaluated by endocrinology in the past during previous admission at [facility] (Oct 22) and they recommend get repeat TFT, TSI and thyroid abs   -Endocrinology input noted patient likely subclinical hypothyroidism  -Outpatient endocrinology follow-up recommended    Severe protein-calorie malnutrition   Assessment & Plan  -Encourage adequate protein and calorie intake    Multiple electrolyte abnormalities  Assessment & Plan  -currently K and Mg normal  -cont PO K    Tremor of right hand  Assessment & Plan  -will need outpatient follow-up w/ neurology     History of CVA (cerebrovascular accident)  Assessment & Plan  -CT head in ER: No acute intracranial abnormality   Encephalomalacia involving the right occipital lobe and bilateral cerebelli   Grossly stable appearance of periventricular hypoattenuation compared to MRI brain 2022, likely representing chronic microvascular ischemic changes  -H/o CVA x 3 in setting of antiphospholipid syndrome  -cont Coumadin for anticoagulation (INR currently 2 89)  -Awaiting placement to SNF for rehabilitation    Antiphospholipid antibody with hypercoagulable state   Assessment & Plan  -Continue Coumadin, Goal INR 2-3 (currently 2 89)  -had ischemic stroke in the past despite Eliquis, hence, deemed Eliquis failure      Paroxysmal atrial fibrillation (HCC)  Assessment & Plan  -cont Toprol-XL  -coumadin was on hold (after fall and prior to colonoscopy), now restarted  -cont to trend INR, currently 2 89        VTE Pharmacologic Prophylaxis: VTE Score: 3 coumadin    Patient Centered Rounds: I performed bedside rounds with nursing staff today  Time Spent for Care: 20 minutes  More than 50% of total time spent on counseling and coordination of care as described above  Current Length of Stay: 36 day(s)  Current Patient Status: Inpatient   Discharge Plan: Medically stable for discharge  Case management aware  Code Status: Level 3 - DNAR and DNI    Subjective:   Patient denies chest pain, shortness of breath, abdominal pain  Objective:     Vitals:   Temp (24hrs), Av 4 °F (36 3 °C), Min:97 2 °F (36 2 °C), Max:97 7 °F (36 5 °C)    Temp:  [97 2 °F (36 2 °C)-97 7 °F (36 5 °C)] 97 7 °F (36 5 °C)  HR:  [79-99] 99  Resp:  [17-20] 17  BP: (107-156)/(57-96) 156/96  SpO2:  [97 %-98 %] 97 %  Body mass index is 26 65 kg/m²  Input and Output Summary (last 24 hours):      Intake/Output Summary (Last 24 hours) at 2022 0910  Last data filed at 2022 0224  Gross per 24 hour   Intake 240 ml   Output 1380 ml   Net -1140 ml       Physical Exam:   Gen: NAD, Awake and alert, well developed, well nourished  Eyes: EOMI, PERRLA, no scleral icterus  ENMT:  no nasal discharge, no otic discharge, moist mucous membranes  Neck:  Supple  Cardiovascular: Regular rate and rhythm, normal S1-S2, no murmurs, rubs, or gallops  Lungs: continues to remain  clear to auscultation bilaterally, no wheezes, or rales, or rhonchi  Abdomen: Positive bowel sounds, soft, nontender, nondistended, no palpable organomegaly, rectal tube in place with loose brown stool   Skin:  Intact, no obvious lesions or rashes, no edema  Neuro: remains Cranial nerves 2-12 are intact, non-focal, moves all 4 extremities, resting tremor  Psych: flat affect    Additional Data:     Labs:  Results from last 7 days   Lab Units 12/07/22  1353   WBC Thousand/uL 9 10   HEMOGLOBIN g/dL 12 1   HEMATOCRIT % 36 0*   PLATELETS Thousands/uL 189     Results from last 7 days   Lab Units 12/07/22  1353   SODIUM mmol/L 137   POTASSIUM mmol/L 4 0   CHLORIDE mmol/L 106   CO2 mmol/L 23   BUN mg/dL 12   CREATININE mg/dL 0 75   ANION GAP mmol/L 8   CALCIUM mg/dL 9 0   GLUCOSE RANDOM mg/dL 111     Results from last 7 days   Lab Units 12/09/22  0504   INR  2 89*                   Lines/Drains:  Invasive Devices     Peripheral Intravenous Line  Duration           Peripheral IV 12/06/22 Distal;Upper;Ventral (anterior); Left Arm 2 days                  Recent Cultures (last 7 days):         Last 24 Hours Medication List:   Current Facility-Administered Medications   Medication Dose Route Frequency Provider Last Rate   • busPIRone  5 mg Oral BID Donna Poe MD     • vitamin B-12  500 mcg Oral Daily Tana Lott, DO     • cyanocobalamin  1,000 mcg Intramuscular Q30 Days Tana Lott, DO     • escitalopram  10 mg Oral Daily Jose Carlos Salter MD     • fluticasone  1 spray Each Nare Daily Tana Lott, DO     • folic acid  1 mg Oral Daily Donna Poe MD     • LORazepam  0 5 mg Oral BID Az Mccall MD     • metoprolol  5 mg Intravenous Q6H PRN Edison Marie MD     • metoprolol succinate  100 mg Oral Daily Az Mccall MD     • multivitamin stress formula  1 tablet Oral Daily Ray Shen MD     • ondansetron  4 mg Intravenous Once PRN Bryanna Lopez MD     • polyethylene glycol  17 g Oral Daily José Weinstein MD     • potassium chloride  40 mEq Oral BID Leanna Cage MD     • sodium chloride  1 spray Each Nare Q1H PRN Leanna Cage MD     • thiamine  100 mg Oral Daily Ray Shen MD     • warfarin  10 mg Oral Daily (warfarin) Chrisite Mason MD          Today, Patient Was Seen By: Christie Mason MD    **Please Note: This note may have been constructed using a voice recognition system  **

## 2022-12-09 NOTE — ASSESSMENT & PLAN NOTE
-CT head in ER: No acute intracranial abnormality   Encephalomalacia involving the right occipital lobe and bilateral cerebelli   Grossly stable appearance of periventricular hypoattenuation compared to MRI brain 8/29/2022, likely representing chronic microvascular ischemic changes    -H/o CVA x 3 in setting of antiphospholipid syndrome  -cont Coumadin for anticoagulation (INR currently 2 89)  -d/c to SNF for rehabilitation LM for patient to return call regarding message below.  Anglela Kearney MA

## 2022-12-09 NOTE — ASSESSMENT & PLAN NOTE
-cont Toprol-XL  -coumadin was on hold (after fall and prior to colonoscopy), now restarted  -INR currently 2 89  Will need repeat INR on 12/11/22

## 2022-12-09 NOTE — ASSESSMENT & PLAN NOTE
-Previous admission from the end of September until the end of October at [facility] in which he was hospitalized for over a month for severe anxiety and inability to care for self  -Per sister reportedly was able to ambulate prior to the hospitalization but after the hospitalization patient needing assistance with ambulation at home  -During hospitalization there patient developed hyponatremia during his course of stay and Zoloft that had been started was stopped due to hyponatremia  -Recently discharged from inpatient psychiatry with mirtazapine and aripiprazole  -Pt seen in consultation by Psychiatry, started on Buspar/Lexapro  -Reconsulted psychiatry for re-evaluation/possible IP psych admission for [facility] - virtual psychiatry on 11/18/22 recommended inpatient psychiatry admission, in-person evaluation with our on-site psychiatrist 11/21/22 did not feel this was necessary  Recommended increasing escitalopram, making lorazepam scheduled as patient does not ask for it but his anxiety might benefit from it, and continuing BusPar   Overall plan is to help patient improve his anxiety so that he can get to rehab   -as per psychiatry, continue current Lexapro/Buspar/Ativan regimen

## 2022-12-09 NOTE — DISCHARGE SUMMARY
[facility]  Discharge- Juan Regalado 1960, 58 y o  male MRN: [id number]  [facility] Encounter: [id number]  Primary Care Provider: Jatin Gutierrez MD   Date and time admitted to hospital: 11/3/2022  3:28 PM    Overflow diarrhea  Assessment & Plan  -diagnosis is now overflow diarrhea due to constipation  -pt had ongoing watery diarrhea requiring a rectal tube  -Stool C diff PCR negative  -Stool studies negative  -Patient was treated with cholestyramine and Imodium  -Patient was seen in consultation by GI  -Colonoscopy 12/8/22: Solid stool throughout the colon  Performed pancolonic forceps biopsies to rule out colitis and IBD  Some areas of erythema seen likely due to fecal stasis vs colitis  Biopsies taken  The terminal ileum appeared normal   -EGD 12/8/22: The esophagus appeared normal  Five polyps measuring smaller than 5 mm in the fundus of the stomach  Single small nodule in the antrum; performed cold forceps biopsy  Performed forceps biopsies to rule out H  pylori in the body of the stomach, incisura and antrum  Two biopsies taken from the antrum, two biopsies from the body and one biopsy taken from the incisura of the stomach  Rule out H pylori   Performed 6 forceps biopsies to rule out celiac disease in the 1st part of the duodenum and 2nd part of the duodenum   -Imodium/Questran now stopped and Miralax/dulcolax started      * SHIMON (generalized anxiety disorder)  Assessment & Plan  -Previous admission from the end of September until the end of October at [facility] in which he was hospitalized for over a month for severe anxiety and inability to care for self  -Per sister reportedly was able to ambulate prior to the hospitalization but after the hospitalization patient needing assistance with ambulation at home  -During hospitalization there patient developed hyponatremia during his course of stay and Zoloft that had been started was stopped due to hyponatremia  -Recently discharged from inpatient psychiatry with mirtazapine and aripiprazole  -Pt seen in consultation by Psychiatry, started on Buspar/Lexapro  -Reconsulted psychiatry for re-evaluation/possible IP psych admission for [facility] - virtual psychiatry on 11/18/22 recommended inpatient psychiatry admission, in-person evaluation with our on-site psychiatrist 11/21/22 did not feel this was necessary  Recommended increasing escitalopram, making lorazepam scheduled as patient does not ask for it but his anxiety might benefit from it, and continuing BusPar  Overall plan is to help patient improve his anxiety so that he can get to rehab   -as per psychiatry, continue current Lexapro/Buspar/Ativan regimen    Cognitive impairment  Assessment & Plan  -Per neuropsychology evaluation on 11/14/22, patient does NOT have capacity to make medical decisions   -Patient also has some level of cognitive impairment   -B12 borderline low, continue supplementation      Hyperthyroidism  Assessment & Plan  -Per chart review patient has had low TSH and elevated free T4 in the past   -Most recent labs during this admission with TSH 0 282 and free t4 1 56  -Thyroid U/S during last admission at [facility] was unremarkable   -Evaluated by endocrinology in the past during previous admission at [facility] (Oct 22) and they recommend get repeat TFT, TSI and thyroid abs   -Endocrinology input noted patient likely subclinical hypothyroidism  -Outpatient endocrinology follow-up recommended    Severe protein-calorie malnutrition   Assessment & Plan  -Encourage adequate protein and calorie intake    Multiple electrolyte abnormalities  Assessment & Plan  -cont PO K  -recheck K on day of discharge  Also needs recheck of K on 12/11/22      Tremor of right hand  Assessment & Plan  -will need outpatient follow-up w/ neurology     History of CVA (cerebrovascular accident)  Assessment & Plan  -CT head in ER: No acute intracranial abnormality   Encephalomalacia involving the right occipital lobe and bilateral cerebelli   Grossly stable appearance of periventricular hypoattenuation compared to MRI brain 8/29/2022, likely representing chronic microvascular ischemic changes  -H/o CVA x 3 in setting of antiphospholipid syndrome  -cont Coumadin for anticoagulation (INR currently 2 89)  -d/c to SNF for rehabilitation    Antiphospholipid antibody with hypercoagulable state   Assessment & Plan  -Continue Coumadin, Goal INR 2-3 (currently 2 89)  -had ischemic stroke in the past despite Eliquis, hence, deemed Eliquis failure      Paroxysmal atrial fibrillation (HCC)  Assessment & Plan  -cont Toprol-XL  -coumadin was on hold (after fall and prior to colonoscopy), now restarted  -INR currently 2 89  Will need repeat INR on 12/11/22  Medical Problems     Resolved Problems  Date Reviewed: 12/9/2022          Resolved    Supratherapeutic INR 11/9/2022     Resolved by  Jamal Allen DO    Rhabdomyolysis 11/9/2022     Resolved by  Rajeev Gee MD    Hypernatremia 11/30/2022     Resolved by  Eudora Heimlich, DO    DAYAN (acute kidney injury) ([facility] [address] ) 11/6/2022     Resolved by  Jamal Allen DO    Sepsis ([facility] [address] ) 11/10/2022     Resolved by  Jamal Allen DO    Dehydration 11/16/2022     Resolved by  Jamal Allen DO    Abdominal distension 11/16/2022     Resolved by  Jamal Allen DO        Discharging Physician / Practitioner: Ismael Knox MD  PCP: Cullen Melendez MD  Admission Date:   Admission Orders (From admission, onward)     Ordered        11/03/22 2200  INPATIENT ADMISSION  Once                      Discharge Date: 12/09/22    Consultations During Hospital Stay:  · GI  · Psychiatry  · Endocrinology  · Nephrology  · Neurology  · Hematology/oncology    Procedures Performed:   · EGD/colonoscopy as above    Significant Findings / Test Results:   · See above  · CT brain 12/6/22: No acute intracranial abnormality  · CT C-spine 12/6/22: No cervical spine fracture or traumatic malalignment  · CXR 11/23/22: Low lung volumes   No acute cardiopulmonary disease  Incidental Findings:   · None    Test Results Pending at Discharge (will require follow up):   · K on 12/9/22     Outpatient Tests Requested:  · BMP and INR 12/11/22  · TFT, TSI and thyroid abs within 4 weeks    Complications:  Overflow diarrhea as above    Reason for Admission: Fatigue and decreased appetite    Hospital Course:   Barrera Heck is a 58 y o  male patient who originally presented to the hospital on 11/3/2022 due to fatigue and decreased appetite as outlined in the H&P done on admission  Hospital course per problem list:    Please see above list of diagnoses and related plan for additional information  Condition at Discharge: good    Discharge Day Visit / Exam:   * Please refer to separate progress note for these details *    Discharge instructions/Information to patient and family:   See after visit summary for information provided to patient and family  Provisions for Follow-Up Care:  See after visit summary for information related to follow-up care and any pertinent home health orders  Disposition:   SNF     Discharge Statement:  I spent 31 minutes discharging the patient  This time was spent on the day of discharge  I had direct contact with the patient on the day of discharge  Greater than 50% of the total time was spent examining patient, answering all patient questions, arranging and discussing plan of care with patient as well as directly providing post-discharge instructions  Additional time then spent on discharge activities  Discharge Medications:  See after visit summary for reconciled discharge medications provided to patient and/or family        **Please Note: This note may have been constructed using a voice recognition system**

## 2022-12-12 NOTE — UTILIZATION REVIEW
NOTIFICATION OF ADMISSION DISCHARGE   This is a Notification of Discharge from [address]  Please be advised that this patient has been discharge from our facility  Below you will find the admission and discharge date and time including the patient’s disposition  UTILIZATION REVIEW CONTACT:  Emanuel Sanches  Utilization   Network Utilization Review Department  Phone: [phone] x carefully listen to the prompts  All voicemails are confidential   Email: [email or web address]     ADMISSION INFORMATION  PRESENTATION DATE: 11/3/2022  3:28 PM  OBERVATION ADMISSION DATE:   INPATIENT ADMISSION DATE: 11/3/22 10:00 PM   DISCHARGE DATE: 12/9/2022 12:28 PM   DISPOSITION:Non [facility] SNF/TCU/SNU    IMPORTANT INFORMATION:  Send all requests for admission clinical reviews, approved or denied determinations and any other requests to dedicated fax number below belonging to the campus where the patient is receiving treatment   List of dedicated fax numbers:  [address] DENIALS (Administrative/Medical Necessity) [phone]   [address] (Maternity/NICU/Pediatrics) [phone]   [facility] [phone]   [facility] 87 [phone]   [address] [phone]   220 [facility] [phone] 90 [facility] [phone]   [address] [place] [address] 119 [phone]   [facility]  [phone] 405[address] [facility] [phone]   412 [facility] [address] [facility] [phone]

## 2022-12-19 NOTE — PROGRESS NOTES
Spoke to [facility] at [facility] to discuss patient  At skilled nursing facility, anxiety was initially doing relatively well, but patient has rapidly developed heightened anxiety, difficult to redirect, excessive worry over the weekend  Currently, patient is being treated with medications to address anxiety and is being monitored closely  Patient must be discharged because insurance is not covering him, so he will be discharged by the middle of the week this week  Following this, interviewer reached out to patient's sister at [phone] to discuss need for inpatient psychiatry for further treatment as an option if patient continues to worsen  She stated that she and Lasimon Hu are both against this because he went into the inpatient psychiatry unit and left on a "walker" with need for physical therapy  He is likely not a candidate for ECT due to cardiothoracic history; thoracic aneurysm  She stated she and patient are in agreement that patient will be brought home from skilled nursing facility and taken care of at home  If patient is not able to care for self, interviewer once again recommended that patient be brought to the emergency department by sister or EMS  Sister is in agreement and understands  Following this phone call, interviewer reach back out to Sung Curran at the [facility] to discuss phone call  She understands the plan that sister and patient are in agreement to have him discharged to home from SNF where her sister states she would take care of him and if patient continues to worsen that sister plans to bring patient to the emergency department for further work-up and evaluation      Alfonzo Fischer MD

## 2022-12-20 ENCOUNTER — TELEPHONE (OUTPATIENT)
Dept: PSYCHIATRY | Facility: CLINIC | Age: 62
End: 2022-12-20

## 2022-12-20 NOTE — TELEPHONE ENCOUNTER
Per Dr Cassy Styles, I left a message for sister of this patient to schedule an appointment  Appointment would need to be made for Monday after 1:45 on (to have Dr Nigel Chamberlain included ) Left my name and number in message requesting a call back

## 2022-12-21 ENCOUNTER — VBI (OUTPATIENT)
Dept: ADMINISTRATIVE | Facility: OTHER | Age: 62
End: 2022-12-21

## 2022-12-27 ENCOUNTER — OFFICE VISIT (OUTPATIENT)
Dept: FAMILY MEDICINE CLINIC | Facility: CLINIC | Age: 62
End: 2022-12-27

## 2022-12-27 VITALS
HEART RATE: 83 BPM | TEMPERATURE: 97.8 F | BODY MASS INDEX: 28.79 KG/M2 | RESPIRATION RATE: 18 BRPM | DIASTOLIC BLOOD PRESSURE: 88 MMHG | OXYGEN SATURATION: 99 % | HEIGHT: 68 IN | WEIGHT: 190 LBS | SYSTOLIC BLOOD PRESSURE: 140 MMHG

## 2022-12-27 DIAGNOSIS — R60.0 EDEMA OF BOTH LOWER LEGS: ICD-10-CM

## 2022-12-27 DIAGNOSIS — R25.1 TREMOR OF RIGHT HAND: ICD-10-CM

## 2022-12-27 DIAGNOSIS — E87.1 HYPONATREMIA: ICD-10-CM

## 2022-12-27 DIAGNOSIS — E78.2 MIXED HYPERLIPIDEMIA: Chronic | ICD-10-CM

## 2022-12-27 DIAGNOSIS — E44.1 MILD PROTEIN-CALORIE MALNUTRITION (HCC): ICD-10-CM

## 2022-12-27 DIAGNOSIS — Z79.01 ANTICOAGULATED ON COUMADIN: ICD-10-CM

## 2022-12-27 DIAGNOSIS — E05.90 HYPERTHYROIDISM: ICD-10-CM

## 2022-12-27 DIAGNOSIS — K59.04 CHRONIC IDIOPATHIC CONSTIPATION: ICD-10-CM

## 2022-12-27 DIAGNOSIS — I48.0 PAROXYSMAL ATRIAL FIBRILLATION (HCC): Primary | ICD-10-CM

## 2022-12-27 DIAGNOSIS — R53.81 PHYSICAL DECONDITIONING: ICD-10-CM

## 2022-12-27 DIAGNOSIS — R41.89 COGNITIVE IMPAIRMENT: ICD-10-CM

## 2022-12-27 DIAGNOSIS — E22.2 SIADH (SYNDROME OF INAPPROPRIATE ADH PRODUCTION) (HCC): ICD-10-CM

## 2022-12-27 DIAGNOSIS — F41.1 GAD (GENERALIZED ANXIETY DISORDER): ICD-10-CM

## 2022-12-27 DIAGNOSIS — D68.61 ANTIPHOSPHOLIPID ANTIBODY WITH HYPERCOAGULABLE STATE (HCC): ICD-10-CM

## 2022-12-27 DIAGNOSIS — E55.9 VITAMIN D DEFICIENCY: ICD-10-CM

## 2022-12-27 DIAGNOSIS — Z86.73 HISTORY OF CVA (CEREBROVASCULAR ACCIDENT): ICD-10-CM

## 2022-12-27 DIAGNOSIS — I10 PRIMARY HYPERTENSION: ICD-10-CM

## 2022-12-27 PROBLEM — Z00.8 MEDICAL CLEARANCE FOR INCARCERATION: Status: RESOLVED | Noted: 2022-09-30 | Resolved: 2022-12-27

## 2022-12-27 PROBLEM — F41.0 ANXIETY DISORDER DUE TO GENERAL MEDICAL CONDITION WITH PANIC ATTACK: Status: RESOLVED | Noted: 2022-01-01 | Resolved: 2022-01-01

## 2022-12-27 PROBLEM — E43 SEVERE PROTEIN-CALORIE MALNUTRITION (HCC): Status: RESOLVED | Noted: 2022-11-08 | Resolved: 2022-12-27

## 2022-12-27 PROBLEM — F06.4 ANXIETY DISORDER DUE TO GENERAL MEDICAL CONDITION WITH PANIC ATTACK: Status: RESOLVED | Noted: 2022-01-01 | Resolved: 2022-01-01

## 2022-12-27 PROBLEM — I49.8 SINUS ARRHYTHMIA: Status: RESOLVED | Noted: 2022-10-10 | Resolved: 2022-12-27

## 2022-12-27 RX ORDER — FUROSEMIDE 20 MG/1
20 TABLET ORAL DAILY
Start: 2022-12-27

## 2022-12-27 RX ORDER — WARFARIN SODIUM 4 MG/1
8 TABLET ORAL
COMMUNITY
Start: 2022-12-23

## 2022-12-27 RX ORDER — MIRTAZAPINE 15 MG/1
TABLET, FILM COATED ORAL
COMMUNITY
Start: 2022-10-26

## 2022-12-27 NOTE — ASSESSMENT & PLAN NOTE
Recommend follow-up with psychiatry as scheduled  This is very important for him  Reviewed that I will be also asking for assistance in the form of social work through [facility], and then once outperform [facility] can have social work from this office

## 2022-12-27 NOTE — ASSESSMENT & PLAN NOTE
Labs will be due  Check in the future  Last blood work for lipid panel was in hospital in September

## 2022-12-27 NOTE — ASSESSMENT & PLAN NOTE
Patient did have tremors  Discontinued in the hospital   It was recommended to follow-up with neurology as an outpatient

## 2022-12-27 NOTE — ASSESSMENT & PLAN NOTE
Malnutrition Findings:                      BMI is now above 20  Will continue to follow  Likely was secondary to issues of being admitted to the hospital for prolonged period of time  BMI Findings: Body mass index is 28 89 kg/m²

## 2022-12-27 NOTE — ASSESSMENT & PLAN NOTE
On Warfarin  Needs labs  Patient is currently on 4 mg daily from being at rehab facility  Check INR, then adjust based on that

## 2022-12-27 NOTE — ASSESSMENT & PLAN NOTE
Blood pressure is minimally elevated today, but definitely better than what it was November 3  For the moment, no specific changes  We will continue to observe, and then consider changes later on

## 2022-12-27 NOTE — PATIENT INSTRUCTIONS
Please call [facility] Lab Now: [phone]  This is to schedule for blood tests  1  Paroxysmal atrial fibrillation [facility])  Assessment & Plan:  Patient did have atrial fibrillation before  Currently on warfarin  Rate has been relatively well controlled  Continue to follow  Need to check INR  Follow with cardiology in the future  Orders:  -     Referral to [address] [place] [address]; Future  -     Ambulatory Referral to Social Work Care Management Program; Future  -     Protime-INR; Future    2  Anticoagulated on Coumadin  Assessment & Plan:  On Warfarin  Needs labs  Patient is currently on 4 mg daily from being at rehab facility  Check INR, then adjust based on that  Orders:  -     Referral to [address] [place] [address]; Future  -     Ambulatory Referral to Social Work Care Management Program; Future  -     Protime-INR; Future    3  Antiphospholipid antibody with hypercoagulable state   Assessment & Plan:  Currently on warfarin  Has been doing relatively well with that previously  Orders:  -     Referral to [address] [place] [address]; Future  -     Ambulatory Referral to Social Work Care Management Program; Future  -     Protime-INR; Future    4  Cognitive impairment  Assessment & Plan:  Recommend follow-up with psychiatry as scheduled  This is very important for him  Reviewed that I will be also asking for assistance in the form of social work through [facility], and then once outperform [facility] can have social work from this office  Orders:  -     Referral to [address] [place] [address]; Future  -     Ambulatory Referral to Social Work Care Management Program; Future    5  SHIMON (generalized anxiety disorder)  Assessment & Plan:  Patient does continue to have issues with this  Follow with psychiatry  Orders:  -     Referral to [address] [place] [address]; Future  -     Ambulatory Referral to Social Work Care Management Program; Future    6   History of CVA (cerebrovascular accident)  Assessment & Plan:  History of CVA  Recommend continue with warfarin  Follow-up with neurology  Tried to impress upon him the importance of maintaining warfarin at the recommended dosages  Orders:  -     Referral to [address] [place] [address]; Future  -     Ambulatory Referral to Social Work Care Management Program; Future  -     Protime-INR; Future    7  Mixed hyperlipidemia  Assessment & Plan:  Labs will be due  Check in the future  Last blood work for lipid panel was in hospital in September  Orders:  -     Referral to [address] [place] [address]; Future  -     Ambulatory Referral to Social Work Care Management Program; Future  -     Comprehensive metabolic panel; Future; Expected date: 12/27/2022    8  Primary hypertension  Assessment & Plan:  Blood pressure is minimally elevated today, but definitely better than what it was November 3  For the moment, no specific changes  We will continue to observe, and then consider changes later on  Orders:  -     Referral to [address] [place] [address]; Future  -     Ambulatory Referral to Social Work Care Management Program; Future  -     Comprehensive metabolic panel; Future; Expected date: 12/27/2022  -     CBC and differential; Future  -     TSH, 3rd generation; Future; Expected date: 12/27/2022    9  Hyperthyroidism  -     Referral to [address] [place] [address]; Future  -     Ambulatory Referral to Social Work Care Management Program; Future  -     TSH, 3rd generation; Future; Expected date: 12/27/2022    10  Hyponatremia  Assessment & Plan:  Noted previously  Check labs  Orders:  -     Referral to [address] [place] [address]; Future  -     Ambulatory Referral to Social Work Care Management Program; Future  -     Comprehensive metabolic panel; Future; Expected date: 12/27/2022    11  SIADH (syndrome of inappropriate ADH production) ([facility] [address] )  Assessment & Plan:  Noted previously  Will need to follow-up in the future    Part of the initial admission  Orders:  -     Referral to [address] [place] [address]; Future  -     Ambulatory Referral to Social Work Care Management Program; Future  -     Comprehensive metabolic panel; Future; Expected date: 12/27/2022    12  Vitamin D deficiency  Assessment & Plan:  Check in the future  Orders:  -     Referral to [address] [place] [address]; Future  -     Ambulatory Referral to Social Work Care Management Program; Future    13  Physical deconditioning  Assessment & Plan:  Patient is quite physically deconditioned at this point  Should consider [facility] and PT  Orders:  -     Referral to [address] [place] [address]; Future  -     Ambulatory Referral to Social Work Care Management Program; Future    14  Tremor of right hand  Assessment & Plan:  Patient did have tremors  Discontinued in the hospital   It was recommended to follow-up with neurology as an outpatient  Orders:  -     Referral to [address] [place] [address]; Future  -     Ambulatory Referral to Neurology; Future  -     Ambulatory Referral to Social Work Care Management Program; Future    15  Edema of both lower legs  Assessment & Plan:  He does have swelling in the legs  OK to try lasix, will use for 15 days, 20mg a day  Orders:  -     Referral to [address] [place] [address]; Future  -     Ambulatory Referral to Social Work Care Management Program; Future  -     furosemide (LASIX) 20 mg tablet; Take 1 tablet (20 mg total) by mouth daily    16  Chronic idiopathic constipation  Assessment & Plan:  No current issues per patient  17  Mild protein-calorie malnutrition ([address] )  Assessment & Plan:  Malnutrition Findings:                      BMI is now above 20  Will continue to follow  Likely was secondary to issues of being admitted to the hospital for prolonged period of time  BMI Findings: Body mass index is 28 89 kg/m²               COVID 19 Instructions    Hans Christina 22 was advised to limit contact with others to essential tasks such as getting food, medications, and medical care  Proper handwashing reviewed, and Hand sanitzer when washing is not available  If the patient develops symptoms of COVID 19, the patient should call the office as soon as possible  For 1327-4772 Flu season, it is strongly recommended that Flu Vaccinations be obtained  Virtual Visits:  Cr: This works on smart phones (any phone with Internet browsing capability)  You should get a text message when the provider is ready to see you  Click on the link in the text message, and the call should start  You will need to type in your name, and allow camera and microphone access  This is HIPPA compliant, and secure  If you have not already done so, get immunized to COVID 19  We are committed to getting you vaccinated as soon as possible and will be closely following CDC and [facility]  guidelines as they are released and revised  Please refer to our COVID-19 vaccine webpage for the most up to date information on the vaccine and our distribution efforts  This site will also have the most up to date recommendations for COVID booster vaccine  Jose gallegos    Call [phone] ([phone]), option [phone]    OUR NEW LOCATION:    [address], [address], [place], [address]  Fax: [phone]    Lab services and OB/GYN are at this location as well

## 2022-12-27 NOTE — ASSESSMENT & PLAN NOTE
Patient did have atrial fibrillation before  Currently on warfarin  Rate has been relatively well controlled  Continue to follow  Need to check INR  Follow with cardiology in the future

## 2022-12-27 NOTE — ASSESSMENT & PLAN NOTE
History of CVA  Recommend continue with warfarin  Follow-up with neurology  Tried to impress upon him the importance of maintaining warfarin at the recommended dosages

## 2022-12-27 NOTE — PROGRESS NOTES
Name: Mckinley Paul      : 1960      MRN: [id number]  Encounter Provider: Brad Benedict MD  Encounter Date: 2022   Encounter department: [facility]     1  Paroxysmal atrial fibrillation [facility])  Assessment & Plan:  Patient did have atrial fibrillation before  Currently on warfarin  Rate has been relatively well controlled  Continue to follow  Need to check INR  Follow with cardiology in the future  Orders:  -     Referral to [address] [place] [address]; Future  -     Ambulatory Referral to Social Work Care Management Program; Future  -     Protime-INR; Future    2  Anticoagulated on Coumadin  Assessment & Plan:  On Warfarin  Needs labs  Patient is currently on 4 mg daily from being at rehab facility  Check INR, then adjust based on that  Orders:  -     Referral to [address] [place], [place] [address]; Future  -     Ambulatory Referral to Social Work Care Management Program; Future  -     Protime-INR; Future    3  Antiphospholipid antibody with hypercoagulable state   Assessment & Plan:  Currently on warfarin  Has been doing relatively well with that previously  Orders:  -     Referral to [address] [place] [address]; Future  -     Ambulatory Referral to Social Work Care Management Program; Future  -     Protime-INR; Future    4  Cognitive impairment  Assessment & Plan:  Recommend follow-up with psychiatry as scheduled  This is very important for him  Reviewed that I will be also asking for assistance in the form of social work through [facility], and then once outperform [facility] can have social work from this office  Orders:  -     Referral to [address] [place] [address]; Future  -     Ambulatory Referral to Social Work Care Management Program; Future    5  SHIMON (generalized anxiety disorder)  Assessment & Plan:  Patient does continue to have issues with this  Follow with psychiatry  Orders:  -     [facility] [place];  Future  - Ambulatory Referral to Social Work Care Management Program; Future    6  History of CVA (cerebrovascular accident)  Assessment & Plan:  History of CVA  Recommend continue with warfarin  Follow-up with neurology  Tried to impress upon him the importance of maintaining warfarin at the recommended dosages  Orders:  -     Referral to [address] [place] [address]; Future  -     Ambulatory Referral to Social Work Care Management Program; Future  -     Protime-INR; Future    7  Mixed hyperlipidemia  Assessment & Plan:  Labs will be due  Check in the future  Last blood work for lipid panel was in hospital in September  Orders:  -     Referral to [address] [place] [address]; Future  -     Ambulatory Referral to Social Work Care Management Program; Future  -     Comprehensive metabolic panel; Future; Expected date: 12/27/2022    8  Primary hypertension  Assessment & Plan:  Blood pressure is minimally elevated today, but definitely better than what it was November 3  For the moment, no specific changes  We will continue to observe, and then consider changes later on  Orders:  -     Referral to [address] [place] [address]; Future  -     Ambulatory Referral to Social Work Care Management Program; Future  -     Comprehensive metabolic panel; Future; Expected date: 12/27/2022  -     CBC and differential; Future  -     TSH, 3rd generation; Future; Expected date: 12/27/2022    9  Hyperthyroidism  -     Referral to [address] [place] [address]; Future  -     Ambulatory Referral to Social Work Care Management Program; Future  -     TSH, 3rd generation; Future; Expected date: 12/27/2022    10  Hyponatremia  Assessment & Plan:  Noted previously  Check labs  Orders:  -     Referral to [address] [place] [address]; Future  -     Ambulatory Referral to Social Work Care Management Program; Future  -     Comprehensive metabolic panel; Future; Expected date: 12/27/2022    11   SIADH (syndrome of inappropriate ADH production) [facility])  Assessment & Plan:  Noted previously  Will need to follow-up in the future  Part of the initial admission  Orders:  -     Referral to [address] [place] [address]; Future  -     Ambulatory Referral to Social Work Care Management Program; Future  -     Comprehensive metabolic panel; Future; Expected date: 12/27/2022    12  Vitamin D deficiency  Assessment & Plan:  Check in the future  Orders:  -     Referral to [address] [place] [address]; Future  -     Ambulatory Referral to Social Work Care Management Program; Future    13  Physical deconditioning  Assessment & Plan:  Patient is quite physically deconditioned at this point  Should consider VNA and PT  Orders:  -     Referral to [address] [place] [address]; Future  -     Ambulatory Referral to Social Work Care Management Program; Future    14  Tremor of right hand  Assessment & Plan:  Patient did have tremors  Discontinued in the hospital   It was recommended to follow-up with neurology as an outpatient  Orders:  -     Referral to [address] [place] [address]; Future  -     Ambulatory Referral to Neurology; Future  -     Ambulatory Referral to Social Work Care Management Program; Future    15  Edema of both lower legs  Assessment & Plan:  He does have swelling in the legs  OK to try lasix, will use for 15 days, 20mg a day  Orders:  -     Referral to [address] [place] [address]; Future  -     Ambulatory Referral to Social Work Care Management Program; Future  -     furosemide (LASIX) 20 mg tablet; Take 1 tablet (20 mg total) by mouth daily    16  Chronic idiopathic constipation  Assessment & Plan:  No current issues per patient  17  Mild protein-calorie malnutrition ([facility] [address] )  Assessment & Plan:  Malnutrition Findings:                      BMI is now above 20  Will continue to follow  Likely was secondary to issues of being admitted to the hospital for prolonged period of time  BMI Findings:            Body mass index is 28 89 kg/m²  Subjective      Here to follow up after admissions  He was in hospital for depression and anxiety for about a month, then noted some issues with walking at home, and shortly readmitted  He was then in about about a month  Went to [facility] for a few weeks after that and discharged recently  He reports he had some issues with walking and is doing a bit better with this  In rehab, noted he was tired, due to medications  Was questioning about zoloft  Had mentioned about brownish urine  Reviewed about water intake  Has had some issues with incontinence and needed to use diaper  Overflow diarrhea noted in hospital  Had rectal tube  Reports is OK now  Had colonoscopy, and was OK per biopsy reports  A-Fib: On coumadin  Cog impairment: Noted in hospital  Was NOT able to make decisions then  He also mentioned that his legs were having some issues with weakness due to swelling  Review of Systems   Unable to perform ROS: Dementia       Current Outpatient Medications on File Prior to Visit   Medication Sig   • atorvastatin (LIPITOR) 40 mg tablet Take 1 tablet (40 mg total) by mouth daily with dinner   • busPIRone (BUSPAR) 5 mg tablet Take 1 tablet (5 mg total) by mouth 2 (two) times a day   • cyanocobalamin (VITAMIN B-12) 500 MCG tablet Take 1 tablet (500 mcg total) by mouth daily Do not start before December 10, 2022  • cyanocobalamin 1,000 mcg/mL Inject 1 mL (1,000 mcg total) into a muscle every 30 (thirty) days Do not start before December 14, 2022  • escitalopram (LEXAPRO) 10 mg tablet Take 1 tablet (10 mg total) by mouth daily Do not start before December 10, 2022  • famotidine (PEPCID) 40 MG tablet Take 1 tablet (40 mg total) by mouth 2 (two) times a day   • fluticasone (FLONASE) 50 mcg/act nasal spray 1 spray into each nostril daily Do not start before December 10, 2022     • folic acid (FOLVITE) 1 mg tablet Take 1 tablet (1 mg total) by mouth daily Do not start before December 10, 2022  • metoprolol succinate (TOPROL-XL) 100 mg 24 hr tablet Take 1 tablet (100 mg total) by mouth daily Do not start before December 10, 2022  • mirtazapine (REMERON) 15 mg tablet TAKE 1 5 TABLETS (22 5 MG TOTAL) BY MOUTH DAILY AT BEDTIME   • potassium chloride (K-DUR,KLOR-CON) 20 mEq tablet Take 2 tablets (40 mEq total) by mouth 2 (two) times a day   • sodium chloride (OCEAN) 0 65 % nasal spray 1 spray into each nostril every hour as needed for congestion   • thiamine 100 MG tablet Take 1 tablet (100 mg total) by mouth daily Do not start before December 10, 2022  • warfarin (COUMADIN) 4 mg tablet Take 8 mg by mouth daily at bedtime   • LORazepam (ATIVAN) 0 5 mg tablet Take 1 tablet (0 5 mg total) by mouth 2 (two) times a day for 4 doses   • polyethylene glycol (MIRALAX) 17 g packet Take 17 g by mouth daily Do not start before December 10, 2022  (Patient not taking: Reported on 12/27/2022)   • [DISCONTINUED] warfarin (COUMADIN) 5 mg tablet Take 2 tablets (10 mg total) by mouth daily       Objective     /88 (BP Location: Right arm, Patient Position: Sitting, Cuff Size: Standard)   Pulse 83   Temp 97 8 °F (36 6 °C) (Tympanic)   Resp 18   Ht 5' 8" (1 727 m)   Wt 86 2 kg (190 lb)   SpO2 99%   BMI 28 89 kg/m²     Physical Exam  Vitals and nursing note reviewed  Constitutional:       General: He is not in acute distress  Appearance: He is well-developed  He is not diaphoretic  HENT:      Head: Normocephalic and atraumatic  Right Ear: Hearing, tympanic membrane, ear canal and external ear normal       Left Ear: Hearing, tympanic membrane, ear canal and external ear normal       Nose: Nose normal       Right Sinus: No maxillary sinus tenderness or frontal sinus tenderness  Left Sinus: No maxillary sinus tenderness or frontal sinus tenderness  Mouth/Throat:      Pharynx: Uvula midline  No oropharyngeal exudate     Eyes:      General: Lids are everted, no foreign bodies appreciated  Conjunctiva/sclera: Conjunctivae normal       Pupils: Pupils are equal, round, and reactive to light  Neck:      Thyroid: No thyromegaly  Vascular: No carotid bruit  Trachea: No tracheal deviation  Cardiovascular:      Rate and Rhythm: Normal rate and regular rhythm  Pulses:           Carotid pulses are 2+ on the right side and 2+ on the left side  Heart sounds: Normal heart sounds  No murmur heard  No friction rub  No gallop  Pulmonary:      Effort: Pulmonary effort is normal  No respiratory distress  Breath sounds: Normal breath sounds  No wheezing or rales  Abdominal:      General: Bowel sounds are normal  There is no distension  Palpations: Abdomen is soft  Tenderness: There is no abdominal tenderness  Musculoskeletal:      Cervical back: Normal range of motion and neck supple  Lymphadenopathy:      Cervical: No cervical adenopathy  Skin:     General: Skin is warm and dry  Neurological:      Mental Status: He is alert and oriented to person, place, and time  Coordination: Coordination normal    Psychiatric:         Attention and Perception: He is inattentive  Mood and Affect: Mood is anxious  Speech: Speech is tangential          Behavior: Behavior normal          Cognition and Memory: Cognition is impaired  Natanael Saleh MD       TCM Call     Date and time call was made  8/2/2022 11:48 AM    Hospital care reviewed  Records reviewed    Patient [facility] hospitial[facility] at  [facility]    Date of Admission  07/29/22    Date of discharge  07/31/22    Diagnosis  Chest Pain    Disposition  Home    Were the patients medications reviewed and updated  No    Current Symptoms  None      TCM Call     Post hospital issues  None    Should patient be enrolled in anticoag monitoring? No    Scheduled for follow up?   Yes    Did you obtain your prescribed medications  Yes    Do you need help managing your prescriptions or medications  No    Is transportation to your appointment needed  No    I have advised the patient to call PCP with any new or worsening symptoms  Hellen Camacho, Practice Administrator    Are you recieving home care services  Yes    Types of home care services  Home PT

## 2022-12-28 ENCOUNTER — HOME HEALTH ADMISSION (OUTPATIENT)
Dept: HOME HEALTH SERVICES | Facility: HOME HEALTHCARE | Age: 62
End: 2022-12-28

## 2022-12-29 ENCOUNTER — TELEPHONE (OUTPATIENT)
Dept: FAMILY MEDICINE CLINIC | Facility: CLINIC | Age: 62
End: 2022-12-29

## 2022-12-29 ENCOUNTER — TELEPHONE (OUTPATIENT)
Dept: HEMATOLOGY ONCOLOGY | Facility: CLINIC | Age: 62
End: 2022-12-29

## 2022-12-29 ENCOUNTER — TELEPHONE (OUTPATIENT)
Dept: PSYCHIATRY | Facility: CLINIC | Age: 62
End: 2022-12-29

## 2022-12-29 ENCOUNTER — HOME CARE VISIT (OUTPATIENT)
Dept: HOME HEALTH SERVICES | Facility: HOME HEALTHCARE | Age: 62
End: 2022-12-29

## 2022-12-29 LAB — INR PPP: 5.2 (ref 0.84–1.19)

## 2022-12-29 NOTE — TELEPHONE ENCOUNTER
Mellissa Ansari from [facility] called back his INR was 5 2 today and he is taking 4 mg  The next time they will be out is Tuesday   If you need to speak to her you may reach Mellissa Ansari at [phone]

## 2022-12-29 NOTE — TELEPHONE ENCOUNTER
Patient calling to confirm HFU was cancelled for 1/3/23 @ 9am with [facility]  I informed it was cancelled

## 2022-12-29 NOTE — TELEPHONE ENCOUNTER
Writer contacted Kait Sulliavn from [facility] in regards to a vm we received in which she requested to switch pt's upcoming appt to vv  Writer informed her that a verbal consent from pt is needed in order to make the change or the pt has to call himself  She stated that she will call him to let him know that he needs to call us directly to make the change

## 2022-12-29 NOTE — TELEPHONE ENCOUNTER
Appointment Cancellation Or Reschedule     Person calling in Patient    If other than patient calling, are they listed on the communication consent form? Provider Jethro Tafoya PA-C   Office Visit Date and Time 1/3/23   Office Visit Location [facility]   Did patient want to reschedule their office appointment? If so, when was it scheduled to? no   Did you have STAR scheduled for this appointment? no   Do you need STAR set up for your new appointment? If yes, please send to "PATIENT RIDESHARE" pool for STAR rescheduling no   If you are cancelling appointment, can we notify STAR to cancel ride? If yes, please send to "PATIENT RIDESHARE" pool for STAR to cancel service no   Is this patient calling to reschedule an infusion appointment? no   When is their next infusion appointment? no   Is this patient a Chemo patient? no   Reason for Cancellation or Reschedule Patient will call back to reschedule and makes time      If the patient is a treatment patient, please route this to the office nurse  If the patient is not on treatment, please route to the office MA  If the patient is a surgical oncology patient, please route to surg/onc clinical pool

## 2022-12-29 NOTE — TELEPHONE ENCOUNTER
Appointment Cancellation Or Reschedule     Person calling in Patient    If other than patient calling, are they listed on the communication consent form? Provider Baron London PA-C   Office Visit Date and Time 01/03 at [address] Visit Location [facility]   Did patient want to reschedule their office appointment? If so, when was it scheduled to? no   Did you have STAR scheduled for this appointment? no   Do you need STAR set up for your new appointment? If yes, please send to "PATIENT RIDESHARE" pool for STAR rescheduling no   If you are cancelling appointment, can we notify STAR to cancel ride? If yes, please send to "PATIENT RIDESHARE" pool for STAR to cancel service no   Is this patient calling to reschedule an infusion appointment? no   When is their next infusion appointment? n/a   Is this patient a Chemo patient? no   Reason for Cancellation or Reschedule Patient canceled, does not want to reschedule     If the patient is a treatment patient, please route this to the office nurse  If the patient is not on treatment, please route to the office MA  If the patient is a surgical oncology patient, please route to surg/onc clinical pool

## 2022-12-29 NOTE — TELEPHONE ENCOUNTER
Marya Clayton from [address] called regarding patient's INR order  Per Marya Clayton, on the INR paperwork she has there is a date of 9/2  She is calling to clarify what date his next INR is supposed to be  Please call her at [phone] to clarify, thank you!

## 2022-12-29 NOTE — TELEPHONE ENCOUNTER
Scheduling Appointment SEND TO [facility]    Who Is Calling to Schedule Patient    Doctor Mariia Barrientos PA-C   Location [facility]   Date and Time 1/3/23   Reason for scheduling appointment Rescheduling for appointment canceled    Patient verbalized understanding    Yes

## 2022-12-30 ENCOUNTER — REMOTE DEVICE CLINIC VISIT (OUTPATIENT)
Dept: CARDIOLOGY CLINIC | Facility: CLINIC | Age: 62
End: 2022-12-30

## 2022-12-30 ENCOUNTER — ANTICOAG VISIT (OUTPATIENT)
Dept: FAMILY MEDICINE CLINIC | Facility: CLINIC | Age: 62
End: 2022-12-30

## 2022-12-30 ENCOUNTER — HOME CARE VISIT (OUTPATIENT)
Dept: HOME HEALTH SERVICES | Facility: HOME HEALTHCARE | Age: 62
End: 2022-12-30

## 2022-12-30 ENCOUNTER — TELEPHONE (OUTPATIENT)
Dept: FAMILY MEDICINE CLINIC | Facility: CLINIC | Age: 62
End: 2022-12-30

## 2022-12-30 VITALS
HEART RATE: 63 BPM | SYSTOLIC BLOOD PRESSURE: 140 MMHG | TEMPERATURE: 96.7 F | OXYGEN SATURATION: 99 % | DIASTOLIC BLOOD PRESSURE: 80 MMHG

## 2022-12-30 DIAGNOSIS — Z95.818 PRESENCE OF OTHER CARDIAC IMPLANTS AND GRAFTS: Primary | ICD-10-CM

## 2022-12-30 NOTE — PROGRESS NOTES
CARELINK TRANSMISSION: LOOP RECORDER  PRESENTING RHYTHM NSR @ 84 BPM  BATTERY STATUS "OK " 4 TACHY EPISODES W/ EGRAMS SHOWING OVERSENSING  NO PATIENT ACTIVATED EPISODES  NORMAL DEVICE FUNCTION   DL

## 2022-12-30 NOTE — TELEPHONE ENCOUNTER
Please see green sheet  Instructions for INR and for retesting are there  I would like to have him retested on Tuesday

## 2022-12-30 NOTE — TELEPHONE ENCOUNTER
Noted blood pressure  With him and experiencing anxiety, he needs to talk to psychiatry  Based on his blood pressure, no changes will be needed for that today

## 2022-12-30 NOTE — TELEPHONE ENCOUNTER
Fred Gibbs from [facility] called  [phone]  Today patient's bp 144/98  Patient is experiencing anxiety  Yesterday when nurse took it it was 140/80  Walt wanted Dr Jeff Zazueta to be aware  Thank you

## 2023-01-03 ENCOUNTER — HOME CARE VISIT (OUTPATIENT)
Dept: HOME HEALTH SERVICES | Facility: HOME HEALTHCARE | Age: 63
End: 2023-01-03

## 2023-01-03 ENCOUNTER — ANTICOAG VISIT (OUTPATIENT)
Dept: FAMILY MEDICINE CLINIC | Facility: CLINIC | Age: 63
End: 2023-01-03

## 2023-01-03 ENCOUNTER — TELEPHONE (OUTPATIENT)
Dept: FAMILY MEDICINE CLINIC | Facility: CLINIC | Age: 63
End: 2023-01-03

## 2023-01-03 ENCOUNTER — APPOINTMENT (OUTPATIENT)
Dept: LAB | Facility: CLINIC | Age: 63
End: 2023-01-03

## 2023-01-03 VITALS
SYSTOLIC BLOOD PRESSURE: 140 MMHG | TEMPERATURE: 96.9 F | OXYGEN SATURATION: 98 % | HEART RATE: 87 BPM | RESPIRATION RATE: 18 BRPM | DIASTOLIC BLOOD PRESSURE: 70 MMHG

## 2023-01-03 VITALS — OXYGEN SATURATION: 98 % | DIASTOLIC BLOOD PRESSURE: 92 MMHG | HEART RATE: 86 BPM | SYSTOLIC BLOOD PRESSURE: 146 MMHG

## 2023-01-03 DIAGNOSIS — I48.0 PAROXYSMAL ATRIAL FIBRILLATION (HCC): ICD-10-CM

## 2023-01-03 DIAGNOSIS — D68.61 ANTIPHOSPHOLIPID ANTIBODY WITH HYPERCOAGULABLE STATE (HCC): ICD-10-CM

## 2023-01-03 DIAGNOSIS — F06.4 ANXIETY DISORDER DUE TO GENERAL MEDICAL CONDITION WITH PANIC ATTACK: ICD-10-CM

## 2023-01-03 DIAGNOSIS — E05.90 HYPERTHYROIDISM: ICD-10-CM

## 2023-01-03 DIAGNOSIS — F41.0 ANXIETY DISORDER DUE TO GENERAL MEDICAL CONDITION WITH PANIC ATTACK: ICD-10-CM

## 2023-01-03 DIAGNOSIS — I10 PRIMARY HYPERTENSION: ICD-10-CM

## 2023-01-03 DIAGNOSIS — E22.2 SIADH (SYNDROME OF INAPPROPRIATE ADH PRODUCTION) (HCC): ICD-10-CM

## 2023-01-03 DIAGNOSIS — Z79.01 ANTICOAGULATED ON COUMADIN: ICD-10-CM

## 2023-01-03 DIAGNOSIS — E78.2 MIXED HYPERLIPIDEMIA: Chronic | ICD-10-CM

## 2023-01-03 DIAGNOSIS — Z86.73 HISTORY OF CVA (CEREBROVASCULAR ACCIDENT): ICD-10-CM

## 2023-01-03 DIAGNOSIS — E87.1 HYPONATREMIA: ICD-10-CM

## 2023-01-03 LAB
ALBUMIN SERPL BCP-MCNC: 3.6 G/DL (ref 3.5–5)
ALP SERPL-CCNC: 120 U/L (ref 46–116)
ALT SERPL W P-5'-P-CCNC: 21 U/L (ref 12–78)
ANION GAP SERPL CALCULATED.3IONS-SCNC: 9 MMOL/L (ref 4–13)
AST SERPL W P-5'-P-CCNC: 20 U/L (ref 5–45)
BASOPHILS # BLD AUTO: 0.03 THOUSANDS/ÂΜL (ref 0–0.1)
BASOPHILS NFR BLD AUTO: 1 % (ref 0–1)
BILIRUB SERPL-MCNC: 2.62 MG/DL (ref 0.2–1)
BUN SERPL-MCNC: 6 MG/DL (ref 5–25)
CALCIUM SERPL-MCNC: 9.2 MG/DL (ref 8.3–10.1)
CHLORIDE SERPL-SCNC: 106 MMOL/L (ref 96–108)
CO2 SERPL-SCNC: 27 MMOL/L (ref 21–32)
CREAT SERPL-MCNC: 0.89 MG/DL (ref 0.6–1.3)
EOSINOPHIL # BLD AUTO: 0.02 THOUSAND/ÂΜL (ref 0–0.61)
EOSINOPHIL NFR BLD AUTO: 0 % (ref 0–6)
ERYTHROCYTE [DISTWIDTH] IN BLOOD BY AUTOMATED COUNT: 14.5 % (ref 11.6–15.1)
GFR SERPL CREATININE-BSD FRML MDRD: 91 ML/MIN/1.73SQ M
GLUCOSE SERPL-MCNC: 103 MG/DL (ref 65–140)
HCT VFR BLD AUTO: 40.2 % (ref 36.5–49.3)
HGB BLD-MCNC: 13.2 G/DL (ref 12–17)
IMM GRANULOCYTES # BLD AUTO: 0.02 THOUSAND/UL (ref 0–0.2)
IMM GRANULOCYTES NFR BLD AUTO: 0 % (ref 0–2)
LYMPHOCYTES # BLD AUTO: 0.77 THOUSANDS/ÂΜL (ref 0.6–4.47)
LYMPHOCYTES NFR BLD AUTO: 14 % (ref 14–44)
MCH RBC QN AUTO: 31.4 PG (ref 26.8–34.3)
MCHC RBC AUTO-ENTMCNC: 32.8 G/DL (ref 31.4–37.4)
MCV RBC AUTO: 96 FL (ref 82–98)
MONOCYTES # BLD AUTO: 0.4 THOUSAND/ÂΜL (ref 0.17–1.22)
MONOCYTES NFR BLD AUTO: 7 % (ref 4–12)
NEUTROPHILS # BLD AUTO: 4.26 THOUSANDS/ÂΜL (ref 1.85–7.62)
NEUTS SEG NFR BLD AUTO: 78 % (ref 43–75)
NRBC BLD AUTO-RTO: 0 /100 WBCS
PLATELET # BLD AUTO: 183 THOUSANDS/UL (ref 149–390)
PMV BLD AUTO: 11.2 FL (ref 8.9–12.7)
POTASSIUM SERPL-SCNC: 3.7 MMOL/L (ref 3.5–5.3)
PROT SERPL-MCNC: 6.2 G/DL (ref 6.4–8.4)
RBC # BLD AUTO: 4.21 MILLION/UL (ref 3.88–5.62)
SODIUM SERPL-SCNC: 142 MMOL/L (ref 135–147)
TSH SERPL DL<=0.05 MIU/L-ACNC: 0.41 UIU/ML (ref 0.45–4.5)
WBC # BLD AUTO: 5.5 THOUSAND/UL (ref 4.31–10.16)

## 2023-01-03 RX ORDER — METOPROLOL SUCCINATE 100 MG/1
100 TABLET, EXTENDED RELEASE ORAL DAILY
Qty: 30 TABLET | Refills: 5 | Status: CANCELLED | OUTPATIENT
Start: 2023-01-03

## 2023-01-03 RX ORDER — METOPROLOL SUCCINATE 100 MG/1
100 TABLET, EXTENDED RELEASE ORAL DAILY
Refills: 0 | Status: CANCELLED | OUTPATIENT
Start: 2023-01-03

## 2023-01-03 RX ORDER — METOPROLOL SUCCINATE 100 MG/1
100 TABLET, EXTENDED RELEASE ORAL DAILY
Qty: 30 TABLET | Refills: 2 | Status: SHIPPED | OUTPATIENT
Start: 2023-01-03

## 2023-01-03 RX ORDER — BUSPIRONE HYDROCHLORIDE 5 MG/1
5 TABLET ORAL 2 TIMES DAILY
Refills: 0 | Status: CANCELLED | OUTPATIENT
Start: 2023-01-03

## 2023-01-03 RX ORDER — LORAZEPAM 0.5 MG/1
0.5 TABLET ORAL 2 TIMES DAILY
Qty: 4 TABLET | Refills: 0 | Status: CANCELLED | OUTPATIENT
Start: 2023-01-03 | End: 2023-01-05

## 2023-01-03 RX ORDER — ESCITALOPRAM OXALATE 10 MG/1
10 TABLET ORAL DAILY
Refills: 0 | Status: CANCELLED | OUTPATIENT
Start: 2023-01-03

## 2023-01-03 NOTE — TELEPHONE ENCOUNTER
Patient also needs a refill of Buspirone sent to the same pharmacy - [facility] on [address] [facility]

## 2023-01-03 NOTE — TELEPHONE ENCOUNTER
Medication: metoprolol succinate 100 mg   escitalopram 10 mg   Lorazepam  5 mg   Day supply: 30  Pharmacy: [facility] on [address] [place] [address]    Last office visit: 12/30/2022    Upcoming office visit: Visit date not found

## 2023-01-03 NOTE — TELEPHONE ENCOUNTER
Patient should be getting these medications from psychiatry and following with psychiatry  The only exception is the metoprolol, so please refuse that and I will send that to the pharmacy for him  The other medications, please take care of those if you think they are appropriate

## 2023-01-03 NOTE — TELEPHONE ENCOUNTER
Ayaka Lorenzo from [address] [place], [place] [address] called; she was at the patient's house today and toan labs including his INR  She will be going back to his house on 1/6/23

## 2023-01-03 NOTE — TELEPHONE ENCOUNTER
Requested Prescriptions     Pending Prescriptions Disp Refills   • metoprolol succinate (TOPROL-XL) 100 mg 24 hr tablet  0     Sig: Take 1 tablet (100 mg total) by mouth daily   • escitalopram (LEXAPRO) 10 mg tablet  0     Sig: Take 1 tablet (10 mg total) by mouth daily   • LORazepam (ATIVAN) 0 5 mg tablet 4 tablet 0     Sig: Take 1 tablet (0 5 mg total) by mouth 2 (two) times a day for 4 doses   • busPIRone (BUSPAR) 5 mg tablet  0     Sig: Take 1 tablet (5 mg total) by mouth 2 (two) times a day       PLEASE REVIEW MEDICATION CAREFULLY

## 2023-01-04 ENCOUNTER — APPOINTMENT (OUTPATIENT)
Dept: LAB | Facility: MEDICAL CENTER | Age: 63
End: 2023-01-04

## 2023-01-04 ENCOUNTER — TELEPHONE (OUTPATIENT)
Dept: PSYCHIATRY | Facility: CLINIC | Age: 63
End: 2023-01-04

## 2023-01-04 ENCOUNTER — HOME CARE VISIT (OUTPATIENT)
Dept: HOME HEALTH SERVICES | Facility: HOME HEALTHCARE | Age: 63
End: 2023-01-04

## 2023-01-04 RX ORDER — LORAZEPAM 0.5 MG/1
0.5 TABLET ORAL 2 TIMES DAILY
Qty: 30 TABLET | Refills: 1 | Status: SHIPPED | OUTPATIENT
Start: 2023-01-04 | End: 2023-02-03

## 2023-01-04 RX ORDER — ESCITALOPRAM OXALATE 10 MG/1
10 TABLET ORAL DAILY
Qty: 30 TABLET | Refills: 2 | Status: SHIPPED | OUTPATIENT
Start: 2023-01-04

## 2023-01-04 RX ORDER — LORAZEPAM 0.5 MG/1
0.5 TABLET ORAL 2 TIMES DAILY
Qty: 60 TABLET | Refills: 0
Start: 2023-01-04 | End: 2023-01-04 | Stop reason: SDUPTHER

## 2023-01-04 RX ORDER — LORAZEPAM 0.5 MG/1
0.5 TABLET ORAL 2 TIMES DAILY
Qty: 60 TABLET | Refills: 0 | Status: CANCELLED | OUTPATIENT
Start: 2023-01-04 | End: 2023-02-03

## 2023-01-04 RX ORDER — BUSPIRONE HYDROCHLORIDE 5 MG/1
5 TABLET ORAL 2 TIMES DAILY
Qty: 30 TABLET | Refills: 2 | Status: SHIPPED | OUTPATIENT
Start: 2023-01-04

## 2023-01-04 NOTE — TELEPHONE ENCOUNTER
Pt called to request medication refill - lorazepam  Writer informed pt that provider sent over to the pharmacy the scripts for lorazepam, escitalopram and buspirone

## 2023-01-04 NOTE — TELEPHONE ENCOUNTER
Patient requested refill for ativan 0 5mg BID, 30 day supply, with 0 refills  Refill request was sent to Dr Checo Pichardo, my indirect supervisor, who will review and decide to approve/send to pharmacy  Also refilled Lexapro 10 mg daily and BuSpar 5 mg daily, both of which will be sent to the pharmacy and do not need approval by supervisor      Georgette Hutchins MD

## 2023-01-04 NOTE — TELEPHONE ENCOUNTER
Pt called and asked about his prescriptions  Writer checked the chart and all of his prescriptions were sent to the [facility] on file

## 2023-01-04 NOTE — TELEPHONE ENCOUNTER
Pt called in requesting a refill on lexapro 10mg  MR reviewed chart and noticed it was prescribed by another provider on 12/10/22, and shows as pending start date of 1/3/23  MR advised pt to call pharmacy to verify if it is at the pharmacy  Pt understood and was advised if had any further questions or concerns to please feel free to call back

## 2023-01-04 NOTE — PROGRESS NOTES
Per hernandez [facility]  called and stated there was an incident with pts INR results  She stated that pts original tube came up short and were going to have [facility] run out to retake tube for pt  Unfortunately they did print label out with pts info and placed it on incorrect tube resulting in incorrect INR for pt  She did state she will inform pt and have them go out today to retreieve blood to inform us of correct lab result  Dr General Mcardle was notified  Will await INR to give pt correct instructions  Did inform Margarita Navarrete that we did decrease his medication from 4mg to 2 mg as of yesterday per that INR result that was incorrect  She also mentioned last INR she has is from September, she has no documentation/ results of 12/30/22 INR of 5 2 that was called in per federico from [facility]

## 2023-01-05 ENCOUNTER — HOME CARE VISIT (OUTPATIENT)
Dept: HOME HEALTH SERVICES | Facility: HOME HEALTHCARE | Age: 63
End: 2023-01-05

## 2023-01-06 ENCOUNTER — HOME CARE VISIT (OUTPATIENT)
Dept: HOME HEALTH SERVICES | Facility: HOME HEALTHCARE | Age: 63
End: 2023-01-06

## 2023-01-06 ENCOUNTER — TELEPHONE (OUTPATIENT)
Dept: FAMILY MEDICINE CLINIC | Facility: CLINIC | Age: 63
End: 2023-01-06

## 2023-01-06 ENCOUNTER — ANTICOAG VISIT (OUTPATIENT)
Dept: FAMILY MEDICINE CLINIC | Facility: CLINIC | Age: 63
End: 2023-01-06

## 2023-01-06 VITALS — HEART RATE: 67 BPM | OXYGEN SATURATION: 99 % | DIASTOLIC BLOOD PRESSURE: 88 MMHG | SYSTOLIC BLOOD PRESSURE: 158 MMHG

## 2023-01-06 NOTE — CASE COMMUNICATION
I evaluated this patient on 12/30/22 and it was a 1 x 1 visit  He is moving about his home independently and able to do stairs with a rail independently  His anxiety is his most limiting factor but his functional mobility is good  No further visits planned

## 2023-01-06 NOTE — TELEPHONE ENCOUNTER
Yeny Loyola from [address] [place] [address] [facility] called  [phone]  Patient's INR done through a meter today was 1 4  She wanted Dr Parisa Logan to be aware  She will also be returning on Monday if it needs to be rechecked - please let her know  Thank you

## 2023-01-09 ENCOUNTER — TELEPHONE (OUTPATIENT)
Dept: FAMILY MEDICINE CLINIC | Facility: CLINIC | Age: 63
End: 2023-01-09

## 2023-01-09 ENCOUNTER — HOME CARE VISIT (OUTPATIENT)
Dept: HOME HEALTH SERVICES | Facility: HOME HEALTHCARE | Age: 63
End: 2023-01-09

## 2023-01-09 ENCOUNTER — ANTICOAG VISIT (OUTPATIENT)
Dept: FAMILY MEDICINE CLINIC | Facility: CLINIC | Age: 63
End: 2023-01-09

## 2023-01-09 VITALS
TEMPERATURE: 97.1 F | HEART RATE: 66 BPM | SYSTOLIC BLOOD PRESSURE: 160 MMHG | RESPIRATION RATE: 18 BRPM | OXYGEN SATURATION: 98 % | DIASTOLIC BLOOD PRESSURE: 90 MMHG

## 2023-01-09 LAB — INR PPP: 1.8 (ref 0.84–1.19)

## 2023-01-10 ENCOUNTER — HOME CARE VISIT (OUTPATIENT)
Dept: HOME HEALTH SERVICES | Facility: HOME HEALTHCARE | Age: 63
End: 2023-01-10

## 2023-01-10 VITALS
SYSTOLIC BLOOD PRESSURE: 150 MMHG | OXYGEN SATURATION: 99 % | DIASTOLIC BLOOD PRESSURE: 80 MMHG | HEART RATE: 68 BPM | TEMPERATURE: 96.9 F

## 2023-01-10 NOTE — CASE COMMUNICATION
Pt was DC from [address] [place] [address] Dr today 1 10 23  at Pts request   Note  Pt anxious about constipation w pacing back and forth in the room part of the st session  He reported chest pain at a level 3 that comes and goes and constipation for 5 days  phone call Nurse Logan Mayen to discuss chest pain and constipation  Note Pt reportedly did not want this SLP to call the Dr  but agreed to call Nurse Logan Mayen  Note written to Pt and discussed recommendatio ns w Sister regarding  Carmens recommendation,  If chest pain and shortness of breath continue go to ER    For constipation take Merilax or drink prune juice today if no go  If planning to go take Dulcolax w water  today  Drink more water  Call Logan Mayen at [phone]; if questions    Impression  Pt made progress during 2 st sessions but impression remains as follows  Observed cognitive deficits in the following areas  Mild  Moderate in i mmediate memory and problem solving  mild in STM  WFL in LTM and organization  Hearing  [facility] Vision  reportedly WFL  Wears glasses for reading  Speech and language observed to be [facility]  Pt  reportedly had ST for swallowing only during recent hospital stay but was dced on regular diet w thin liquids  He reportedly takes medication for to control reflux  ST Rec   DC ST at Patients request due to too many apts  Write passwords and pin n os on piece of paper and keep in a safe place as a reference  Wrote home and mobile no on post it note to tape on back of mobile phone as a reference       Practice assignments given     Refer if change in status

## 2023-01-10 NOTE — CASE COMMUNICATION
Phone call and note sent to [facility] to report the Pt reported that he had a slight pain in his chest at a 3 on a scale of 1 to 10  He also reported that he has constipation for 5 days     Note written to Pt as per Marty recommendation,  If chest pain and shortness of breath continue go to ER    For constipation take Merilax or drink prune juice today if no go  If planning to go take Dulcolax w water  today  Drink more water  Call Cecilia osorio at [phone]; if questions

## 2023-01-11 ENCOUNTER — VBI (OUTPATIENT)
Dept: ADMINISTRATIVE | Facility: OTHER | Age: 63
End: 2023-01-11

## 2023-01-11 NOTE — TELEPHONE ENCOUNTER
01/11/23 3:45 PM     VB [employer] SmartForm used to document caregap status      [facility]

## 2023-01-12 ENCOUNTER — HOME CARE VISIT (OUTPATIENT)
Dept: HOME HEALTH SERVICES | Facility: HOME HEALTHCARE | Age: 63
End: 2023-01-12

## 2023-01-12 ENCOUNTER — ANTICOAG VISIT (OUTPATIENT)
Dept: FAMILY MEDICINE CLINIC | Facility: CLINIC | Age: 63
End: 2023-01-12

## 2023-01-12 ENCOUNTER — TELEPHONE (OUTPATIENT)
Dept: FAMILY MEDICINE CLINIC | Facility: CLINIC | Age: 63
End: 2023-01-12

## 2023-01-12 DIAGNOSIS — I48.0 PAROXYSMAL ATRIAL FIBRILLATION (HCC): Primary | ICD-10-CM

## 2023-01-12 DIAGNOSIS — Z79.01 ANTICOAGULATED ON COUMADIN: ICD-10-CM

## 2023-01-12 LAB — INR PPP: 2.6 (ref 0.84–1.19)

## 2023-01-12 RX ORDER — WARFARIN SODIUM 4 MG/1
8 TABLET ORAL
Qty: 180 TABLET | Refills: 1 | Status: SHIPPED | OUTPATIENT
Start: 2023-01-12

## 2023-01-12 NOTE — TELEPHONE ENCOUNTER
Medication: Warfarin 4 mg   Day supply:   Pharmacy: [facility] on [address]  Last office visit: 12/27/22   Upcoming office visit: N/A     Medication: ,Metoprolol 100 mg   Day supply: 30   Pharmacy: [facility] on [address] [facility]  Last office visit: 12/27/22   Upcoming office visit: N/A     Patient is calling to request refills of the above medications  He has about 5 days left of medication

## 2023-01-12 NOTE — TELEPHONE ENCOUNTER
Montana Perry from [facility] called, Patient INR today 1/12/23 is 2 6  She would be returning again next Thursday on 1/19/23  Green sheet given to Dr Gordon

## 2023-01-16 ENCOUNTER — OFFICE VISIT (OUTPATIENT)
Dept: PSYCHIATRY | Facility: CLINIC | Age: 63
End: 2023-01-16

## 2023-01-16 ENCOUNTER — HOME CARE VISIT (OUTPATIENT)
Dept: HOME HEALTH SERVICES | Facility: HOME HEALTHCARE | Age: 63
End: 2023-01-16

## 2023-01-16 VITALS
TEMPERATURE: 96.9 F | RESPIRATION RATE: 18 BRPM | HEART RATE: 70 BPM | DIASTOLIC BLOOD PRESSURE: 78 MMHG | OXYGEN SATURATION: 98 % | SYSTOLIC BLOOD PRESSURE: 150 MMHG

## 2023-01-16 VITALS — BODY MASS INDEX: 25.54 KG/M2 | WEIGHT: 168 LBS

## 2023-01-16 DIAGNOSIS — F32.2 AGITATED DEPRESSION (HCC): Primary | ICD-10-CM

## 2023-01-16 DIAGNOSIS — F41.0 ANXIETY DISORDER DUE TO GENERAL MEDICAL CONDITION WITH PANIC ATTACK: ICD-10-CM

## 2023-01-16 DIAGNOSIS — G47.00 INSOMNIA, UNSPECIFIED TYPE: ICD-10-CM

## 2023-01-16 DIAGNOSIS — F06.4 ANXIETY DISORDER DUE TO GENERAL MEDICAL CONDITION WITH PANIC ATTACK: ICD-10-CM

## 2023-01-16 RX ORDER — BUSPIRONE HYDROCHLORIDE 5 MG/1
5 TABLET ORAL 2 TIMES DAILY
Qty: 30 TABLET | Refills: 2 | Status: SHIPPED | OUTPATIENT
Start: 2023-01-16

## 2023-01-16 RX ORDER — ESCITALOPRAM OXALATE 10 MG/1
5 TABLET ORAL DAILY
Qty: 11 TABLET | Refills: 0 | Status: SHIPPED | OUTPATIENT
Start: 2023-01-16 | End: 2023-02-06

## 2023-01-16 RX ORDER — OLANZAPINE 5 MG/1
5 TABLET ORAL
Qty: 30 TABLET | Refills: 1 | Status: SHIPPED | OUTPATIENT
Start: 2023-01-16 | End: 2023-02-15

## 2023-01-16 RX ORDER — DULOXETIN HYDROCHLORIDE 20 MG/1
CAPSULE, DELAYED RELEASE ORAL
Qty: 47 CAPSULE | Refills: 0 | Status: SHIPPED | OUTPATIENT
Start: 2023-01-16 | End: 2023-02-12

## 2023-01-16 NOTE — PATIENT INSTRUCTIONS
Start taking Cymbalta 20 mg daily for 1 week (1 tablet) followed by cymbalta 40 mg daily until follow up visit    Start taking olanzapine 5 mg every night at bedtime (1 tablet)    Decrease Lexapro to 5 mg daily (1/2 tablet) for 21 days    Continue BuSpar 5 mg twice a day      Please present for your previously scheduled appointment approximately 15 minutes prior to appointment time  If you are running late or are unable to attend your appointment, please call our [place] office at [phone] or our [facility] [place] office at [phone] if applicable to notify the office of your absence  If you are in psychological crisis including not limited to suicidal/homicidal thoughts or thoughts of self-injury, do not hesitate to call/contact your [facility] hotline (see below) or attend to the nearest emergency department  [facility] Crisis: [address] Crisis: [phone]  [address] Crisis: [phone]  [facility] Crisis: [address] Crisis: [address] Crisis: [address] [facility] Crisis: [phone]  National Suicide Prevention Hotline: 3-257.688.9353    Please call the office nursing staff for medication issues including refills, problems getting medications, bothersome side effects, etc at [phone]  Please return for a follow up appointment as discussed  If you are running late or are unable to attend your appointment, please call our [place] office at [phone], or if [facility] [place] [facility], please call [phone]

## 2023-01-16 NOTE — PSYCH
Psychiatric Follow Up Visit - Behavioral Health   MRN: [id number]    Visit Time  Start: 2:00pm  Stop: 3:00pm    Total Visit Duration: 60 minutes    History of Present Illness   Kathleen Gates is 58 y o  and now presenting to follow up for anxiety, depression and focus problems  Prior to last appointment on 10/31/2022, patient was brought to inpatient psychiatry geriatric unit at [place] [address] for severe generalized anxiety, fatigue, decreased appetite, and inability to care for self, protein malnutrition and electrolyte abnormalities  Patient was discharged on Lexapro 10 mg daily, BuSpar 5 mg twice a day, and lorazepam 0 5 mg twice a day  At discharge, he was unable to walk without a walker and was not caring for self or taking care of ADLs, so patient was brought to Hospital for protein malnutrition and electrolyte abnormality  Patient was stabilized and discharged on Lexapro 10 mg daily, BuSpar 5 mg twice a day, and Ativan 0 5 mg twice a day for anxiety and panic  On interview today, patient is present with sister  He states he is better able to care for himself, and sister agrees on this, but sister does endorse patient having difficulty caring for self  He showers once every few days and only 1 sister helps because of balance difficulties  Patient states sister gets perseverative on subjects such as the house running out of oil, not figuring out what to eat, worry about the future and upkeep of the house  Throughout the day he paces, rocks back and forth, strokes his hair, worries about taxes and broken car  He no longer sees physical therapy and feels that his ambulation is improving  As interview continued, patient became more agitated and perseverative on leaving the visit, but would not give a reason why  He denied SI, HI, or AVH  Psychiatric Review Of Systems:  • Josette Fernando reports Symptoms as described in HPI    • Josette Fernando denies Current suicidal thoughts, plan, or intent, Current thoughts of self-harm or Current homicidal thoughts, plan, or intent      Medical Review Of Systems:  Complete review of systems is negative except as noted above     ------------------------------------  Past Medical History:   Diagnosis Date   • Aneurysm of thoracic aorta     last assessed 11/20/17   • Anxiety     currently on no meds   • Arthritis    • Bilateral inguinal hernia    • Cardiac disease    • Cognitive impairment    • CVA (cerebral vascular accident) ([facility] )    • Depression    • GERD (gastroesophageal reflux disease)    • Hearing loss of aging    • Heart disease    • Hyperlipidemia    • Hypertension    • Irritable bowel syndrome    • Kidney stone    • Mild protein-calorie malnutrition ([facility] ) 10/7/2022   • Obesity    • Occasional tremors     left arm since stroke   • Palpitations    • Panic attack    • PONV (postoperative nausea and vomiting)     and headache x 3 days   • Psychiatric illness    • Sciatica     right and left  side   • Severe protein-calorie malnutrition  11/8/2022   • Shortness of breath     on exertion   • Sleep apnea     is not using a CPAP   • Sleep difficulties    • Spitting up blood 05/21/2021    Resolved   • Status post placement of implantable loop recorder    • Stroke ([facility] ) 11/2014   • Stroke ([place] [address] ) 03/2021   • TIA (transient ischemic attack)       Past Surgical History:   Procedure Laterality Date   • AORTA SURGERY      thoracic - aneurysmorrhaphy   • APPENDECTOMY     • ASCENDING AORTIC ANEURYSM REPAIR      resolved 8/19/15   • CARDIAC LOOP RECORDER     • CHOLECYSTECTOMY      laparoscopic   • COLONOSCOPY     • CYSTOSCOPY      with removal of object- stent removal   • KNEE ARTHROSCOPY Right 1996    with medial meniscus repair   • LITHOTRIPSY      renal   • ORTHOPEDIC SURGERY     • FL LAPAROSCOPY SURG RPR INITIAL INGUINAL HERNIA Bilateral 12/9/2021    Procedure: [facility];  Surgeon: Bandar Parnell MD;  Location: [facility];  Service: General   • FL LITHOTRIPSY XTRCORP SHOCK WAVE Left 5/17/2018    Procedure: LITHROTRIPSY EXTRACORPORAL SHOCKWAVE (ESWL); Surgeon: Dang Houser MD;  Location: [facility];  Service: Urology   • THUMB ARTHROSCOPY Right 1976    ligament repair   • UPPER GASTROINTESTINAL ENDOSCOPY         Visit Vitals  Smoking Status Never      Wt Readings from Last 6 Encounters:   12/27/22 86 2 kg (190 lb)   12/08/22 79 5 kg (175 lb 4 3 oz)   10/25/22 84 4 kg (186 lb 1 1 oz)   09/28/22 95 7 kg (211 lb)   09/26/22 95 7 kg (211 lb)   09/21/22 97 1 kg (214 lb)        Mental Status Exam:  Appearance:  alert, moderate eye contact, appears stated age, casually dressed, appropriate grooming and hygiene and thin   Behavior:  calm, limited cooperativity and guarded   Motor: no abnormal movements, slow gait, stable gait with assistive device and left arm tremor   Speech:  spontaneous, clear and repeating words   Mood:  "walking better, worried"   Affect:  constricted and anxious   Thought Process:  generally linear and goal-directed but illogical, disorganized at times   Thought Content: no delusions, obsessive thoughts regarding Perseverative on oil running out at his home   Perceptual disturbances: no reported hallucinations and does not appear to be responding to internal stimuli at this time   Risk Potential: No active or passive suicidal or homicidal ideation was verbalized during interview   Cognition: oriented to self and situation and cognition not formally tested   Insight:  Limited   Judgment: Fair       Meds/Allergies    Allergies   Allergen Reactions   • Losartan Angioedema   • Aspirin Fatigue     Due to blood thinners     • Bactrim [Sulfamethoxazole-Trimethoprim]    • Eliquis [Apixaban] Other (See Comments)     Failed  Had embolic CVA   • Lisinopril      Felt bad, was OK with Zestril brand name     • Tramadol      Current Outpatient Medications   Medication Instructions   • atorvastatin (LIPITOR) 40 mg, Oral, Daily with dinner   • busPIRone (BUSPAR) 5 mg, Oral, 2 times daily   • cyanocobalamin (VITAMIN B-12) 500 mcg, Oral, Daily   • cyanocobalamin 1,000 mcg, Intramuscular, Every 30 days   • escitalopram (LEXAPRO) 10 mg, Oral, Daily   • famotidine (PEPCID) 40 mg, Oral, 2 times daily   • fluticasone (FLONASE) 50 mcg/act nasal spray 1 spray, Nasal, Daily   • folic acid (FOLVITE) 1 mg, Oral, Daily   • furosemide (LASIX) 20 mg, Oral, Daily   • LORazepam (ATIVAN) 0 5 mg, Oral, 2 times daily   • metoprolol succinate (TOPROL-XL) 100 mg, Oral, Daily   • mirtazapine (REMERON) 15 mg tablet TAKE 1 5 TABLETS (22 5 MG TOTAL) BY MOUTH DAILY AT BEDTIME   • polyethylene glycol (MIRALAX) 17 g, Oral, Daily   • potassium chloride (K-DUR,KLOR-CON) 20 mEq tablet 40 mEq, Oral, 2 times daily   • sodium chloride (OCEAN) 0 65 % nasal spray 1 spray, Nasal, Every 1 hour PRN   • thiamine 100 mg, Oral, Daily   • warfarin (COUMADIN) 8 mg, Oral, Daily at bedtime        Labs & Imaging:  I have personally reviewed all pertinent laboratory tests and imaging results  Anticoag visit on 01/12/2023   Component Date Value Ref Range Status   • INR 01/12/2023 2 60 (A)  0 84 - 1 19 Final   Anticoag visit on 01/09/2023   Component Date Value Ref Range Status   • INR 01/09/2023 1 80 (A)  0 84 - 1 19 Final   Appointment on 01/03/2023   Component Date Value Ref Range Status   • Sodium 01/03/2023 142  135 - 147 mmol/L Final   • Potassium 01/03/2023 3 7  3 5 - 5 3 mmol/L Final   • Chloride 01/03/2023 106  96 - 108 mmol/L Final   • CO2 01/03/2023 27  21 - 32 mmol/L Final   • ANION GAP 01/03/2023 9  4 - 13 mmol/L Final   • BUN 01/03/2023 6  5 - 25 mg/dL Final   • Creatinine 01/03/2023 0 89  0 60 - 1 30 mg/dL Final    Standardized to IDMS reference method   • Glucose 01/03/2023 103  65 - 140 mg/dL Final    If the patient is fasting, the ADA then defines impaired fasting glucose as > 100 mg/dL and diabetes as > or equal to 123 mg/dL    Specimen collection should occur prior to Sulfasalazine administration due to the potential for falsely depressed results  Specimen collection should occur prior to Sulfapyridine administration due to the potential for falsely elevated results  • Calcium 01/03/2023 9 2  8 3 - 10 1 mg/dL Final   • AST 01/03/2023 20  5 - 45 U/L Final    Specimen collection should occur prior to Sulfasalazine administration due to the potential for falsely depressed results  • ALT 01/03/2023 21  12 - 78 U/L Final    Specimen collection should occur prior to Sulfasalazine and/or Sulfapyridine administration due to the potential for falsely depressed results  • Alkaline Phosphatase 01/03/2023 120 (H)  46 - 116 U/L Final   • Total Protein 01/03/2023 6 2 (L)  6 4 - 8 4 g/dL Final   • Albumin 01/03/2023 3 6  3 5 - 5 0 g/dL Final   • Total Bilirubin 01/03/2023 2 62 (H)  0 20 - 1 00 mg/dL Final    Use of this assay is not recommended for patients undergoing treatment with eltrombopag due to the potential for falsely elevated results     • eGFR 01/03/2023 91  ml/min/1 73sq m Final   • WBC 01/03/2023 5 50  4 31 - 10 16 Thousand/uL Final   • RBC 01/03/2023 4 21  3 88 - 5 62 Million/uL Final   • Hemoglobin 01/03/2023 13 2  12 0 - 17 0 g/dL Final   • Hematocrit 01/03/2023 40 2  36 5 - 49 3 % Final   • MCV 01/03/2023 96  82 - 98 fL Final   • MCH 01/03/2023 31 4  26 8 - 34 3 pg Final   • MCHC 01/03/2023 32 8  31 4 - 37 4 g/dL Final   • RDW 01/03/2023 14 5  11 6 - 15 1 % Final   • MPV 01/03/2023 11 2  8 9 - 12 7 fL Final   • Platelets 08/77/4179 183  149 - 390 Thousands/uL Final   • nRBC 01/03/2023 0  /100 WBCs Final   • Neutrophils Relative 01/03/2023 78 (H)  43 - 75 % Final   • Immat GRANS % 01/03/2023 0  0 - 2 % Final   • Lymphocytes Relative 01/03/2023 14  14 - 44 % Final   • Monocytes Relative 01/03/2023 7  4 - 12 % Final   • Eosinophils Relative 01/03/2023 0  0 - 6 % Final   • Basophils Relative 01/03/2023 1  0 - 1 % Final   • Neutrophils Absolute 01/03/2023 4 26  1 85 - 7 62 Thousands/µL Final   • Immature Grans Absolute 01/03/2023 0 02  0 00 - 0 20 Thousand/uL Final   • Lymphocytes Absolute 01/03/2023 0 77  0 60 - 4 47 Thousands/µL Final   • Monocytes Absolute 01/03/2023 0 40  0 17 - 1 22 Thousand/µL Final   • Eosinophils Absolute 01/03/2023 0 02  0 00 - 0 61 Thousand/µL Final   • Basophils Absolute 01/03/2023 0 03  0 00 - 0 10 Thousands/µL Final   • TSH 3RD GENERATON 01/03/2023 0 406 (L)  0 450 - 4 500 uIU/mL Final    Adult TSH (3rd generation) reference range follows the recommended guidelines of the American Thyroid Association, January, 2020  Anticoag visit on 12/30/2022   Component Date Value Ref Range Status   • INR 12/29/2022 5 20 (A)  0 84 - 1 19 Final   Ancillary Orders on 12/30/2022   Component Date Value Ref Range Status   • Protime 01/03/2023    Corrected    This is a corrected result  Previous result was 22 0 seconds on 1/3/2023 at 1612 EST   • INR 01/03/2023    Corrected    This is a corrected result  Previous result was 1 89 on 1/3/2023 at 1612 EST   No results displayed because visit has over 200 results  ---------------------------------------    Historical Information   Information is copied from the previous visit and updated today as appropriate  Psychiatric History:   Prior psychiatric diagnoses: depression and anxiety   Inpatient hospitalizations:Twice, recently 11/2022 for severe generalized anxiety and inability to care for self  Remained in hospital for medical work-up  Other inpatient visit was 2014 when attending said he will get aortic surgery, he states that he was brought inpatient psychiatry when the hospital that diagnosed him with aortic injury had planned a surgery for several weeks later and brought him in because they thought he would not follow up/possibly hurt himself prior to surgery      Suicide attempts/self-harm: patient denies  Violent/aggressive behavior: patient denies  Outpatient psychiatric providers: [facility] 2015 after stroke  Past/current psychotherapy: One therapist, in past forgets name  Other Services: unknown  Psychiatric medication trial:   • Unsure of others, dissolvable, klonopin  • lorazepam and mirtazapine (still taking both)    • Sertraline while inpatient admission in 2022, developed hyponatremia and medication was discontinued     Substance Abuse History:  Patient denies use of tobacco, alcohol, or illicit drugs              I have assessed this patient for substance use within the past 12 months      Family Psychiatric History:   Anxiety and depression in 3 sister   Sister attempted to end life      Social History  Family: 3 sisters, no children  Marital history:    Children: no  Living arrangement: Lives in a home with sister  Support system: alone & isolated , but has 3 sisters  Education: some college studied photography  Learning/developmental Disabilities: none  Occupational History: Sold and delivered office supplies, then [employer], retired in 2014 because needed surgery for aortic aneurism   On disability  Legal [address]  Access to firearms: patient denies        Traumatic History:   Abuse: none reported  Other Traumatic Events: none reported      Assessment/Plan:   Mayra Gomez is a 58 y o , , no children, lives with sister, unemployed at this time  PPH of [facility]IM[facility], recently established care with Dr Bettie Alexandre; [facility] of anxiety; No known history of  illicit drug or alcohol use, no suicide attempt, No Hx of violence, patient has multiple cardiac and medical problems including (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, s/p aortic aneurysm, Bicuspid aortic stenosis, antiphospholipid antibody  with hypercoagulable state, PVD , paroxysmal Afib , HTN, HLD, CORIE, Lower back pain   With recent inpatient admission from 9/30-22-10/26/22 for severe anxiety, difficulty caring for self due to the anxiety, and recent medical inpatient stay for protein malnutrition and electrolyte abnormalities following this inpatient psychiatric appointment, following presenting to the [address] [place] [address] [address] [facility] for for follow-up regarding medication management  In the setting of continued severe depression, anxiety, depressive agitation at times, occasional poor insight, and difficulty with ADLs at home, patient and sister are both in agreement to adjust medications to address agitated depression with possible psychotic features  When discussing reality based topics such as his refrigerator having enough food and the house having enough oil, patient perseverates on these topics with his sister who continues to stress that these are all taken care of  Patient refuses to believe this reality and continues to perseverate, repeating words and various worries  Throughout interview, he was rocking in chair and stroking hair, occasional pacing  Patient and sister believe that he is improved in terms of ADLs but not significantly on current psychotropic medication regiment  They are in agreement with doing a cross titration with increasing Cymbalta to 20 mg for 1 week followed by 40 mg with a plan to follow up in 2 weeks  Also, decrease Lexapro to 5 mg for 2 weeks and discuss discontinuation of medication and follow-up appointment  Additionally, olanzapine will be initiated at 5 mg nightly as adjunctive medication  If patient develops side effects or worsening of symptoms over the next several weeks, patient and sister are agreeable to reach out sooner to schedule an earlier visit  For now, patient will be scheduled for follow-up in 2 weeks to see Dr Jimmy Sullivan and Dr Aury Espinoza Friday afternoon for further medication management and continuation of cross taper of medications  DSM-5 Diagnosis:    1  Agitated Depression - not at goal  2  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 attack - not at goal  3   Insomnia, unspecified - not at goal      Treatment Recommendations/Precautions:  · Patient is scheduled to follow up in 2 weeks, in the afternoon, to see Dr Sung Golden / Dr Stacey New  · Initiate Cymbalta 20 mg daily for 1 week followed by cymbalta 40 mg daily for 1 week for agitated depression  · At follow up increase to cymbalta 60mg daily  · Initiate olanzapine 5 mg daily for agitated depression  • Decrease Lexapro 5 mg daily for anxiety depression, then discontinue at follow up visit after patient is on cymbalta 60 mg daily for several days  • Continue BuSpar 5 mg twice a day for anxiety and adjunct to antidepressant  • Continue Ativan 0 5 mg twice a day for anxiety and panic      - Therapy: would not like at this time  - Medical:  Follow up with primary care physician and other specialists for ongoing medical care  - Labs: Most recently obtained 12/2/2022, reviewed  • Medication management every 2 weeks  • Does not want to see a therapist  • Aware of 24 hour and weekend coverage for urgent situations accessed by calling [facility] main practice number      Controlled Medication Discussion:   Rene Alaniz has been filling controlled prescriptions on time as prescribed according to [facility]  Discussed with Rene Alaniz the risks of sedation, respiratory depression, impairment of ability to drive and potential for abuse and addiction related to treatment with benzodiazepine medications  He understands risk of treatment with benzodiazepine medications, agrees to not drive if feels impaired and agrees to take medications as prescribed  Discussed with Natasha Pop warning on concurrent use of benzodiazepines and opioid medications including sedation, respiratory depression, coma and death   He understands the risk of treatment with benzodiazepines in addition to opioids and wants to continue taking those medications  Discussed with Rene Alaniz increased risk of impairment related to concurrent use of benzodiazepines and hypnotic medications including excessive sedation, psychomotor impairment and respiratory depression  He understands the risk of treatment with benzodiazepines in addition to hypnotic medications and wants to continue taking those medications  Discussed with Jose Islas need to slowly taper off benzodiazepines as recommended by Marilyn Cole, due to concurrent use of opioid medications and increased risk of sedation, respiratory depression, coma and death with that combination    Medical Decision Making / Counseling / Coordination of Care: The following interventions are recommended: return in 2 weeks for follow up  Although patient's acute lethality risk is LOW, long-term/chronic lethality risk is mildly elevated given the risk factors listed above  However, at the current moment, Jose Islas is future-oriented, forward-thinking, and demonstrates ability to act in a self-preserving manner as evidenced by volitionally seeking psychiatric evaluation and treatment today  To mitigate future risk, patient should adhere to treatment recommendations, avoid alcohol/illicit substance use, utilize community-based resources and familiar support, and prioritize mental health treatment  The importance of medication and treatment compliance was reviewed with the patient  Individual supportive psychotherapy was provided     The diagnosis and treatment plan were reviewed with the patient  Risks, benefits, and alternatives to treatment were discussed:  • PARQ was completed for the class of SSRIs including transient anxiety, insomnia or sedation, headaches, constipation or diarrhea; and longer-standing sexual side effects, bleeding risk (especially with NSAIDs), and weight gain; as well as suicidal thoughts in patients under 22years old, serotonin syndrome, and induction of jasson   Potential for discontinuation syndrome (flu-like symptoms, insomnia, nausea, sensory disturbances, and headache) was also discussed  • PARQ was completed for second generation antipsychotic medication including sedation, GI distress, dizziness, risk of metabolic syndrome, EPS (akathisia, TD, etc), rare NMS, orthostatic hypotension, cardiovascular risks such as QT prolongation, increased prolactin, and others  • PARQ was completed for buspirone (Buspar) including serotonin syndrome, rare TD/EPS, dizziness, sedation, GI distress, confusion, possible mood changes, xerostomia and visual disturbances  • PARQ was completed for the class of SNRIs including GI distress, headache, insomnia or sedation, sexual side effects, bleeding risk, weight gain, dose-related blood pressure changes, palpitations; and rare suicidal thoughts in patients under 22years old, serotonin syndrome, liver dysfunction, and induction of jasson  Potential for discontinuation syndrome (flu-like symptoms, insomnia, nausea, sensory disturbances, and headache) was also discussed       This note was not shared with the patient due to reasonable likelihood of causing patient harm     Lenora Rodrigues MD

## 2023-01-17 ENCOUNTER — TELEPHONE (OUTPATIENT)
Dept: FAMILY MEDICINE CLINIC | Facility: CLINIC | Age: 63
End: 2023-01-17

## 2023-01-17 DIAGNOSIS — I48.0 PAROXYSMAL ATRIAL FIBRILLATION (HCC): ICD-10-CM

## 2023-01-17 RX ORDER — METOPROLOL SUCCINATE 100 MG/1
100 TABLET, EXTENDED RELEASE ORAL DAILY
Qty: 30 TABLET | Refills: 2 | Status: SHIPPED | OUTPATIENT
Start: 2023-01-17

## 2023-01-17 NOTE — TELEPHONE ENCOUNTER
Medication:  Metoprolol  Quantity:30  Pharmacy: [facility] [address]  Last office visit: 1/13/2023    Upcoming office visit: Visit date not found    He said they are all messed up there and he is out of it

## 2023-01-19 ENCOUNTER — HOME CARE VISIT (OUTPATIENT)
Dept: HOME HEALTH SERVICES | Facility: HOME HEALTHCARE | Age: 63
End: 2023-01-19

## 2023-01-19 ENCOUNTER — TELEPHONE (OUTPATIENT)
Dept: FAMILY MEDICINE CLINIC | Facility: CLINIC | Age: 63
End: 2023-01-19

## 2023-01-19 ENCOUNTER — ANTICOAG VISIT (OUTPATIENT)
Dept: FAMILY MEDICINE CLINIC | Facility: CLINIC | Age: 63
End: 2023-01-19

## 2023-01-19 LAB — INR PPP: 2.7 (ref 0.84–1.19)

## 2023-01-19 NOTE — TELEPHONE ENCOUNTER
Xena Elias called they did his INR today and it is 2 7  They are discharging him on Monday so we will need to set him up for the mobile lab to come out to his house for his next PTINR

## 2023-01-20 ENCOUNTER — TELEPHONE (OUTPATIENT)
Dept: PSYCHIATRY | Facility: CLINIC | Age: 63
End: 2023-01-20

## 2023-01-20 NOTE — TELEPHONE ENCOUNTER
Pts sister called to to inform the provider that the pt committed suicide last night 1/19/2023  Writer asked If she would like a call back from the provider and she stated no  Just to cancel his appt

## 2023-01-21 VITALS
RESPIRATION RATE: 18 BRPM | DIASTOLIC BLOOD PRESSURE: 72 MMHG | HEART RATE: 69 BPM | TEMPERATURE: 96.7 F | OXYGEN SATURATION: 98 % | SYSTOLIC BLOOD PRESSURE: 140 MMHG

## 2023-01-23 ENCOUNTER — HOME CARE VISIT (OUTPATIENT)
Dept: HOME HEALTH SERVICES | Facility: HOME HEALTHCARE | Age: 63
End: 2023-01-23

## 2023-01-31 ENCOUNTER — HOME CARE VISIT (OUTPATIENT)
Dept: HOME HEALTH SERVICES | Facility: HOME HEALTHCARE | Age: 63
End: 2023-01-31

## 2023-02-21 ENCOUNTER — ANTICOAG VISIT (OUTPATIENT)
Dept: FAMILY MEDICINE CLINIC | Facility: CLINIC | Age: 63
End: 2023-02-21

## 2024-06-10 NOTE — OCCUPATIONAL THERAPY NOTE
Occupational Therapy Treatment Note      Barrera Heck    11/21/2022    Active Problems:    SHIMON (generalized anxiety disorder)    Atrial fibrillation (HCC)    Antiphospholipid antibody with hypercoagulable state ([facility] )    Tremor of right hand    CVA (cerebral vascular accident) ([facility] )    Hypernatremia    Severe protein-calorie malnutrition ([facility] )    Hyperthyroidism    Impaired decision making      Past Medical History:   Diagnosis Date    Aneurysm of thoracic aorta     last assessed 11/20/17    Anxiety     currently on no meds    Arthritis     Bilateral inguinal hernia     Cardiac disease     Cognitive impairment     CVA (cerebral vascular accident) ([facility] )     Depression     GERD (gastroesophageal reflux disease)     Hearing loss of aging     Heart disease     Hyperlipidemia     Hypertension     Irritable bowel syndrome     Kidney stone     Obesity     Occasional tremors     left arm since stroke    Palpitations     Panic attack     PONV (postoperative nausea and vomiting)     and headache x 3 days    Psychiatric illness     Sciatica     right and left  side    Shortness of breath     on exertion    Sleep apnea     is not using a CPAP    Sleep difficulties     Spitting up blood 05/21/2021    Resolved    Status post placement of implantable loop recorder     Stroke ([facility] ) 11/2014    Stroke ([facility] ) 03/2021    TIA (transient ischemic attack)        Past Surgical History:   Procedure Laterality Date    AORTA SURGERY      thoracic - aneurysmorrhaphy    APPENDECTOMY      ASCENDING AORTIC ANEURYSM REPAIR      resolved 8/19/15    CARDIAC LOOP RECORDER      CHOLECYSTECTOMY      laparoscopic    COLONOSCOPY      CYSTOSCOPY      with removal of object- stent removal    KNEE ARTHROSCOPY Right 1996    with medial meniscus repair    LITHOTRIPSY      renal    ORTHOPEDIC SURGERY      CT FRAGMENT KIDNEY STONE/ ESWL Left 5/17/2018    Procedure: LITHROTRIPSY EXTRACORPORAL SHOCKWAVE (ESWL);   Surgeon: Bakari Monroe MD;  Location: [facility];  Service: Urology    RI Ameena Raymundo 19 Bilateral 12/9/2021    Procedure: ROBOTIC REPAIR HERNIA INGUINAL;  Surgeon: James Marks MD;  Location: [facility];  Service: General    THUMB ARTHROSCOPY Right 1976    ligament repair    UPPER GASTROINTESTINAL ENDOSCOPY          11/21/22 1051   OT Last Visit   OT Visit Date 11/21/22   Note Type   Note Type Treatment   Pain Assessment   Pain Assessment Tool 0-10   Pain Score No Pain   Restrictions/Precautions   Weight Bearing Precautions Per Order No   Other Precautions Cognitive; Chair Alarm; Bed Alarm; Fall Risk;Multiple lines   Lifestyle   Autonomy Per EMR, at baseline pt is I with ADLS and mobility  Reciprocal Relationships Sister   Service to Others Retired   Intrinsic Gratification Pt reports he likes to paint model cars   ADL   Where Assessed Supine, bed   Eating Assistance 2  Maximal Assistance   Eating Deficit Bringing food to mouth assist;Increased time to complete   Eating Comments pt needing assist due to tremors, decreasd functional use hands   Grooming Assistance 2  Maximal Assistance   Grooming Deficit Increased time to complete   UB Bathing Assistance 2  Maximal Assistance   UB Bathing Deficit Increased time to complete   LB Bathing Assistance 1  Total Assistance   LB Bathing Deficit Increased time to complete   UB Dressing Assistance 2  Maximal Assistance   UB Dressing Deficit Increased time to complete   LB Dressing Assistance 1  Total Assistance   Toileting Assistance  1  Total Assistance   Bed Mobility   Rolling R 2  Maximal assistance   Additional items Assist x 1   Rolling L 2  Maximal assistance   Additional items Assist x 1   Supine to Sit 2  Maximal assistance   Additional items Assist x 1; Increased time required   Transfers   Sit to Stand 2  Maximal assistance   Additional items Assist x 2; Increased time required;Verbal cues   Stand to Sit 2  Maximal assistance   Additional items Assist x 2; Increased time required;Verbal cues   Stand pivot 2  Maximal assistance   Additional items Assist x 2; Increased time required   Additional Comments b/l HHA for SPT   Subjective   Subjective "yes"   Cognition   Overall Cognitive Status Impaired   Arousal/Participation Cooperative   Attention Attends with cues to redirect   Orientation Level Oriented to person;Oriented to place;Oriented to time   Memory Unable to assess   Following Commands Follows one step commands with increased time or repetition   Comments pt able to answer yes and no with increased time  he does not engage in any conversation, difficulty making eye contact, flat affect, able to follow simple directions   Activity Tolerance   Activity Tolerance Patient limited by fatigue;Treatment limited secondary to medical complications (Comment)   Medical Staff Made Aware seen for co-treat with DPT Eliana Shown due to medical complexity   Assessment   Assessment pt seen for OT session this AM  pt found in bed, minimally communicative, but answers yes to therapy  pt needing max assist of one to roll side to side  total assist for hygiene as pt had had a BM, needing to be cleaned up prior to session  once done, pt able to get to EOB w max assist of one  noted to have difficulty leaning to his L side, keeping head/neck laterally flexed to his R side  pt engaged in neck rotation exercises trying to turn his chin towards his L side  difficulty noted, neck stiffness noted  pt also noted to have increased tremors b/l ue's, difficulty self feeding and doing self care  today, pt needing to be fed as well as max assist for self care  initial goals set to  today, will renew x 30 days, change frequency to 2-4x/week  from OT standpoint, pt will need STR  Plan   Treatment Interventions ADL retraining;Functional transfer training;UE strengthening/ROM; Endurance training;Cognitive reorientation;Patient/family training;Neuromuscular reeducation; Compensatory technique education; Energy conservation; Activityengagement   Goal Expiration Date 12/21/22   OT Treatment Day 6   OT Frequency Other (comment)  (2-4X/week)   Recommendation   OT Discharge Recommendation Post acute rehabilitation services   AM-PAC Daily Activity Inpatient   Lower Body Dressing 1   Bathing 2   Toileting 2   Upper Body Dressing 2   Grooming 2   Eating 2   Daily Activity Raw Score 11   Daily Activity Standardized Score (Calc for Raw Score >=11) 29 04 [Arrhythmia/ECG Abnorrmalities] : arrhythmia/ECG abnormalities

## 2024-06-22 NOTE — PROGRESS NOTES
8/16/2019      Chief Complaint   Patient presents with    Nephrolithiasis     1 yr f/u       Assessment and Plan    61 y o  male managed by Dr Kitty Templeton    1  Mild peyronies disease  - stable      2  Routine prostate cancer screening  - PSA 0 8 (8/15/19), 0 7 (4/19/18)  - REGI with no nodules     3  Left 1 2 cm renal pelvis calculus s/p ESWL 5/17/18  - no current stone burden   - calcium oxalate   - kidney stone education handout provided      FU 1 year with KUB, U/S, and PSA prior and REGI at visit       History of Present Illness  Marianne Jones is a 61 y o  male here for follow up evaluation of Peyronie's disease, prostate cancer screening, and nephrolithiasis  The patient presents today doing very well  His x-ray and ultrasound reveal no current stone burden  He underwent ESWL 5/17/2018 and at this time stone analysis revealed calcium oxalate stones  His Peyronie's disease is stable and his PSA is very low and stable at 0 8  He has no urinary complaints at his visit today  These in his AUA symptom score listed as below      Review of Systems   Constitutional: Negative for activity change, chills and fever  Gastrointestinal: Negative for abdominal distention and abdominal pain  Musculoskeletal: Negative for back pain and gait problem  Psychiatric/Behavioral: Negative for behavioral problems and confusion  Urinary Incontinence Screening      Most Recent Value   Urinary Incontinence   Urinary Incontinence? No   Incomplete emptying? No   Urinary frequency? Yes   Urinary urgency? Yes   Urinary hesitancy? No   Dysuria (painful difficult urination)? No   Nocturia (waking up to use the bathroom)? Yes [2X]   Straining (having to push to go)? No   Weak stream?  No   Intermittent stream?  No   Post void dribbling?   No          AUA SYMPTOM SCORE      Most Recent Value   AUA SYMPTOM SCORE   How often have you had a sensation of not emptying your bladder completely after you finished urinating?  0 How often have you had to urinate again less than two hours after you finished urinating? 1   How often have you found you stopped and started again several times when you urinate?  0   How often have you found it difficult to postpone urination? 2   How often have you had a weak urinary stream?  0   How often have you had to push or strain to begin urination? 0   How many times did you most typically get up to urinate from the time you went to bed at night until the time you got up in the morning? 2   Quality of Life: If you were to spend the rest of your life with your urinary condition just the way it is now, how would you feel about that?  2   AUA SYMPTOM SCORE  5          Past Medical History  Past Medical History:   Diagnosis Date    Aneurysm of thoracic aorta ([facility] )     last assessed 11/20/17    Anxiety     currently on no meds    Cardiac disease     Cholelithiasis     Chronic kidney disease     GERD (gastroesophageal reflux disease)     Headache due to anesthesia     Hyperlipidemia     Hypertension     Irritable bowel syndrome     Kidney stone     Palpitations     PONV (postoperative nausea and vomiting)     Shortness of breath     ? related to acid reflux    Sleep apnea     not sure    Stroke [facility]) 11/2014    TIA (transient ischemic attack)        Past Social History  Past Surgical History:   Procedure Laterality Date    AORTA SURGERY      thoracic - aneurysmorrhaphy    APPENDECTOMY      ASCENDING AORTIC ANEURYSM REPAIR      resolved 8/19/15    CHOLECYSTECTOMY      laparoscopic    CYSTOSCOPY      with removal of object- stent removal    KNEE ARTHROSCOPY Right 1996    with medial meniscus repair    LITHOTRIPSY      renal    IA FRAGMENT KIDNEY STONE/ ESWL Left 5/17/2018    Procedure: LITHROTRIPSY EXTRACORPORAL SHOCKWAVE (ESWL);   Surgeon: Nowell Crigler, MD;  Location: [facility];  Service: Urology    THUMB ARTHROSCOPY Right 1976    ligament repair     Social History Tobacco Use   Smoking Status Never Smoker   Smokeless Tobacco Never Used   Tobacco Comment    no second hand smoke exposure       Past Family History  Family History   Problem Relation Age of Onset    Diverticulitis Mother         of colon    Nephrolithiasis Mother     Emphysema Father     Nephrolithiasis Sister     Heart attack Maternal Grandfather 72    Breast cancer Paternal Grandmother     Lung cancer Paternal Grandfather     Cancer Family     Coronary artery disease Family     Diabetes Family         sibling    Hypertension Family         sibling    Hernia Family         sibling       Past Social history  Social History     Socioeconomic History    Marital status:      Spouse name: Not on file    Number of children: Not on file    Years of education: Not on file    Highest education level: Not on file   Occupational History    Occupation: disabled     Social Needs    Financial resource strain: Not on file    Food insecurity:     Worry: Not on file     Inability: Not on file    Transportation needs:     Medical: Not on file     Non-medical: Not on file   Tobacco Use    Smoking status: Never Smoker    Smokeless tobacco: Never Used    Tobacco comment: no second hand smoke exposure   Substance and Sexual Activity    Alcohol use: No    Drug use: No    Sexual activity: Not on file   Lifestyle    Physical activity:     Days per week: Not on file     Minutes per session: Not on file    Stress: Not on file   Relationships    Social connections:     Talks on phone: Not on file     Gets together: Not on file     Attends Confucianism service: Not on file     Active member of club or organization: Not on file     Attends meetings of clubs or organizations: Not on file     Relationship status: Not on file    Intimate partner violence:     Fear of current or ex partner: Not on file     Emotionally abused: Not on file     Physically abused: Not on file     Forced sexual activity: Not on file   Other Topics Concern    Not on file   Social History Narrative    Caffeine use    Completed some college     as per [employer]       Current Medications  Current Outpatient Medications   Medication Sig Dispense Refill    amLODIPine (NORVASC) 5 mg tablet Take 1 tablet (5 mg total) by mouth daily 30 tablet 5    amoxicillin (AMOXIL) 500 mg capsule TAKE 4 CAPSULES 1 HOUR PRIOR TO APPOINTMENT  1    aspirin 325 mg tablet Take 325 mg by mouth daily      metoprolol tartrate (LOPRESSOR) 50 mg tablet TAKE 1 TABLET BY MOUTH TWICE A  tablet 1    pantoprazole (PROTONIX) 40 mg tablet TAKE 1 TABLET (40 MG TOTAL) BY MOUTH DAILY 90 tablet 0    rosuvastatin (CRESTOR) 20 MG tablet Take 1 tablet (20 mg total) by mouth daily 90 tablet 1     No current facility-administered medications for this visit  Allergies  Allergies   Allergen Reactions    Losartan Angioedema    Bactrim [Sulfamethoxazole-Trimethoprim]     Lisinopril      Felt bad, was OK with Zestril brand name   Tramadol          The following portions of the patient's history were reviewed and updated as appropriate: allergies, current medications, past medical history, past social history, past surgical history and problem list       Vitals  Vitals:    08/16/19 0839   BP: 150/90   BP Location: Right arm   Patient Position: Sitting   Cuff Size: Large   Pulse: 64   Weight: 112 kg (246 lb 3 2 oz)   Height: 5' 8" (1 727 m)       Physical Exam  Constitutional   General appearance: Patient is seated and in no acute distress, well appearing and well nourished  Head and Face   Head and face: Normal     Eyes   Conjunctiva and lids: No erythema, swelling or discharge  Ears, Nose, Mouth, and Throat   Hearing: Normal     Pulmonary   Respiratory effort: No increased work of breathing or signs of respiratory distress  Cardiovascular   Examination of extremities for edema and/or varicosities: Normal     Abdomen   Abdomen: Non-tender, no masses  Genitourinary   Digital rectal exam of prostate:  Smooth, nontender, 35 g prostate with no nodules  Musculoskeletal   Gait and station: normal      Skin   Skin and subcutaneous tissue: Warm, dry, and intact  No visible lesions or rashes  Psychiatric   Judgment and insight: Normal  Recent and remote memory:  Normal  Mood and affect: Normal      Results  No results found for this or any previous visit (from the past 1 hour(s)) ]  Lab Results   Component Value Date    PSA 0 8 08/15/2019     Lab Results   Component Value Date    GLUCOSE 103 12/08/2014    CALCIUM 8 9 08/14/2018     03/29/2017    K 3 7 08/14/2018    CO2 29 08/14/2018     08/14/2018    BUN 11 08/14/2018    CREATININE 1 10 08/14/2018     Lab Results   Component Value Date    WBC 9 19 05/01/2018    HGB 14 8 05/01/2018    HCT 42 6 05/01/2018    MCV 87 05/01/2018     05/01/2018       Orders  Orders Placed This Encounter   Procedures    XR abdomen 1 view kub     Standing Status:   Future     Standing Expiration Date:   8/16/2023     Scheduling Instructions:      Bring along any outside films relating to this procedure  Order Specific Question:   Reason for Exam:     Answer:   nephrolithiasis    US kidney and bladder     Standing Status:   Future     Standing Expiration Date:   8/16/2023     Scheduling Instructions:      "Prep required if being scheduled in conjunction with other studies, refer to those examination's Preps first before scheduling  All patients for US Kidney and Bladder they must drink 24 oz of water 60 minutes before your scheduled appointment time  This test requires you to have a FULL bladder  Please do not urinate before your test             Please bring your physician order, insurance cards, a form of photo ID and a list of your medications with you  Arrive 15 minutes prior to your appointment time in order to register              If you need to have lab work or a urinalysis, please do this AFTER your ultrasound  "            To schedule this appointment, please contact Central Scheduling at [phone]  Order Specific Question:   Reason for Exam:     Answer:   Calculi    PSA, Total Screen     This is a patient instruction: This test is non-fasting  Please drink two glasses of water morning of bloodwork          Standing Status:   Future     Standing Expiration Date:   8/16/2020 normal...

## 2024-10-18 NOTE — ASSESSMENT & PLAN NOTE
Ostomy RN Consult/Ostomy RN - Follow Up    Consult for evaluation/management of colostomy    Pre-op teaching session completed: 0  Post-op teaching session: # 2  Surgery: Robot-converted-open Bassam's, D[facility]NA 10/15   Surgeon: Dr. Sena  Diet: CLD  Consults: Ostomy RN, Social Work    History / Reason for admission:  Pt is a 55 year old male admitted with No chief complaint on file.     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    History of meniscal tear 2017    left    Iron deficiency anemia due to chronic blood loss 08/2021    Obesity, morbid, BMI 50 or higher  (CMD)     Vitamin D deficiency 08/2021      Past Surgical History:   Procedure Laterality Date    Colonoscopy  11/14/2022    repeat in 1 yr    Esophagogastroduodenoscopy transoral flex w/bx single or mult  11/14/2022    H pylori    Knee surgery  2017    left meniscal tear       Labs:  Lab Results   Component Value Date    WBC 11.7 (H) 10/18/2024    HGB 7.8 (L) 10/18/2024    HCT 26.0 (L) 10/18/2024    BUN 6 10/18/2024    CREATININE 0.95 10/18/2024    TOTPROTEIN 7.6 09/26/2024    ALBUMIN 3.4 09/26/2024    No results found for: \"RESR\"  No results found for: \"NTPROBNP\", \"NTPROB\"     Vitals:    10/18/24 1324   BP: 131/74   Pulse: (!) 102   Resp: 18   Temp: 98.4 °F (36.9 °C)       Assessment:  Pt. Received in bed, alert and oriented x4. Family at bedside.     10/18/24  Abdomen: Abdomen soft, rounded.  Ostomy:  2 piece ostomy noted, with non filtered bag.   Output: Stool in appliance, loose and green/brown.   Stoma: Moist, oval, beefy red, budded shape. Lumen in center of stoma. Intact with surrounding skin.  Mucocutaneous Junction: Free of tension, infection, skin breakdown.  Peristomal skin: Without erythema or maceration.   Peristomal plane: Without obvious contours or abdominal folds/creases.  Pouching system: Appliance loose over ostomy.  Patient does have some pitting edema of the lower extremities bilaterally  This could be related to amlodipine, but usually it only occurs at 10 mg or higher  For the time being, observe  Watch for weight changes  Should check weight every day, or every other day, if he is gaining 3 or 4 lb per 2-3 days, would need to contact this office to discuss diuresis and possible CHF issues, and at that point would talk about changing from amlodipine to diltiazem      10/16/24  Abdomen: Abdomen soft.  Ostomy:  2 piece ostomy noted, with non filtered bag.   Output: No stool in appliance, with mucus sweat.   Stoma: Moist, oval, beefy red, budded shape. Lumen in center of stoma. Intact with surrounding skin.  Mucocutaneous Junction: Free of tension, infection, skin breakdown.  Peristomal skin: Without erythema or maceration.   Peristomal plane: Without obvious contours or abdominal folds/creases.  Pouching system: Appliance loose over ostomy.       Interventions/Education:  Step-by-step appliance change performed, pt able and family to observe. Reviewed and demonstrated removal of appliance, care of stoma and peristomal skin, sizing of new appliance, placement of new appliance. Reviewed diet and showering. All questions were answered and verbalized understanding.    Reviewed process for obtaining supplies.  Patient enrolled in [employer] Program    Recommendations:   Appliance placed as follows:    [employer] skin barrier/wafer, #77774 size 2.75\"   Nicanor drainable pouch, #35615 size 2.75\"  Nicanor barrier ring, #0100    Extra Supplies provided on 10/26/24):  x10 pouches, x5 barrier/wafers (awaiting 5 more from central supply), x10 barrier rings, ostomy belt    Peristomal skin care: Clean with gauze and plain water. Be cautious with using baby wipes or soap on peristomal skin as may leave residue that can interfere with adherency of barrier/wafer.     Empty pouch when no more than one-third to half full. Change appliance q3-4days and PRN for leakage.  Recommend Home Health RN to continue to follow upon discharge to continue teaching and assist with management.       Plan:  Continue to follow-up post-op Inpatient  On discharge, follow up with ostomy nurse in [facility] in 1-2 weeks, appt needed.      Supplier: [employer]    [facility] agency: TBD      Reviewed above with bedside nurse.

## 2025-03-18 NOTE — ASSESSMENT & PLAN NOTE
-Hx CVA x 3 in setting of antiphospholipid syndrome  -on PTA warfarin although noted supratherapeutic INR  -CT head in ER: No acute intracranial abnormality  Encephalomalacia involving the right occipital lobe and bilateral cerebelli  Grossly stable appearance of periventricular hypoattenuation compared to MRI brain 8/29/2022, likely representing chronic microvascular ischemic changes  -on exam answers questions in a very low voice when asked, noted left upper extremity tremor that remains constant with movement, diffuse fatigue of bilateral lower extremities  Plan  > neurology consultation    Holding PTA warfarin given supratherapeutic INR, neuro checks q 4 hours Stage 4 chronic kidney disease

## (undated) DEVICE — MAYO STAND COVER: Brand: CONVERTORS

## (undated) DEVICE — PENCIL ELECTROSURG E-Z CLEAN -0035H

## (undated) DEVICE — SUT MONOCRYL 4-0 PS-2 27 IN Y426H

## (undated) DEVICE — ASTOUND STANDARD SURGICAL GOWN, XL: Brand: CONVERTORS

## (undated) DEVICE — GLOVE INDICATOR PI UNDERGLOVE SZ 8 BLUE

## (undated) DEVICE — INTENDED FOR TISSUE SEPARATION, AND OTHER PROCEDURES THAT REQUIRE A SHARP SURGICAL BLADE TO PUNCTURE OR CUT.: Brand: BARD-PARKER SAFETY BLADES SIZE 11, STERILE

## (undated) DEVICE — DRAPE SHEET X-LG

## (undated) DEVICE — LUBRICANT INST ELECTROLUBE ANTISTK WO PAD

## (undated) DEVICE — TIP COVER ACCESSORY

## (undated) DEVICE — NEEDLE HYPO 22G X 1-1/2 IN

## (undated) DEVICE — ADHESIVE SKIN HIGH VISCOSITY EXOFIN 1ML

## (undated) DEVICE — MEGA SUTURECUT ND: Brand: ENDOWRIST

## (undated) DEVICE — DRAPE EQUIPMENT RF WAND

## (undated) DEVICE — BLADELESS OBTURATOR: Brand: WECK VISTA

## (undated) DEVICE — COLUMN DRAPE

## (undated) DEVICE — SCD SEQUENTIAL COMPRESSION COMFORT SLEEVE MEDIUM KNEE LENGTH: Brand: KENDALL SCD

## (undated) DEVICE — GLOVE SRG BIOGEL ECLIPSE 7.5

## (undated) DEVICE — SUT VICRYL 0 UR-6 27 IN J603H

## (undated) DEVICE — CANNULA SEAL

## (undated) DEVICE — CHLORAPREP HI-LITE 26ML ORANGE

## (undated) DEVICE — GLOVE SRG BIOGEL 6.5

## (undated) DEVICE — MONOPOLAR CURVED SCISSORS: Brand: ENDOWRIST

## (undated) DEVICE — PMI DISPOSABLE PUNCTURE CLOSURE DEVICE / SUTURE GRASPER: Brand: PMI

## (undated) DEVICE — ARM DRAPE

## (undated) DEVICE — GLOVE INDICATOR PI UNDERGLOVE SZ 6.5 BLUE

## (undated) DEVICE — SUT VICRYL 2-0 SH 27 IN UNDYED J417H

## (undated) DEVICE — PROGRASP FORCEPS: Brand: ENDOWRIST

## (undated) DEVICE — SUT VLOC 90 3-0 V-20 9IN VLOCM0644

## (undated) DEVICE — ALLENTOWN LAP CHOLE APP PACK: Brand: CARDINAL HEALTH

## (undated) DEVICE — [HIGH FLOW INSUFFLATOR,  DO NOT USE IF PACKAGE IS DAMAGED,  KEEP DRY,  KEEP AWAY FROM SUNLIGHT,  PROTECT FROM HEAT AND RADIOACTIVE SOURCES.]: Brand: PNEUMOSURE